# Patient Record
Sex: FEMALE | Race: WHITE | NOT HISPANIC OR LATINO | URBAN - METROPOLITAN AREA
[De-identification: names, ages, dates, MRNs, and addresses within clinical notes are randomized per-mention and may not be internally consistent; named-entity substitution may affect disease eponyms.]

---

## 2017-06-17 ENCOUNTER — EMERGENCY (EMERGENCY)
Facility: HOSPITAL | Age: 32
LOS: 1 days | Discharge: HOME | End: 2017-06-17
Attending: EMERGENCY MEDICINE | Admitting: EMERGENCY MEDICINE
Payer: COMMERCIAL

## 2017-06-17 VITALS
SYSTOLIC BLOOD PRESSURE: 122 MMHG | DIASTOLIC BLOOD PRESSURE: 79 MMHG | TEMPERATURE: 98 F | RESPIRATION RATE: 20 BRPM | HEIGHT: 61 IN | WEIGHT: 175.93 LBS | HEART RATE: 99 BPM | OXYGEN SATURATION: 98 %

## 2017-06-17 LAB
ALBUMIN SERPL ELPH-MCNC: 3.8 G/DL — SIGNIFICANT CHANGE UP (ref 3.3–5.2)
ALP SERPL-CCNC: 81 U/L — SIGNIFICANT CHANGE UP (ref 40–120)
ALT FLD-CCNC: 14 U/L — SIGNIFICANT CHANGE UP
ANION GAP SERPL CALC-SCNC: 15 MMOL/L — SIGNIFICANT CHANGE UP (ref 5–17)
AST SERPL-CCNC: 16 U/L — SIGNIFICANT CHANGE UP
BASOPHILS # BLD AUTO: 0 K/UL — SIGNIFICANT CHANGE UP (ref 0–0.2)
BASOPHILS NFR BLD AUTO: 0.1 % — SIGNIFICANT CHANGE UP (ref 0–2)
BILIRUB SERPL-MCNC: 0.4 MG/DL — SIGNIFICANT CHANGE UP (ref 0.4–2)
BUN SERPL-MCNC: 7 MG/DL — LOW (ref 8–20)
CALCIUM SERPL-MCNC: 9.2 MG/DL — SIGNIFICANT CHANGE UP (ref 8.6–10.2)
CHLORIDE SERPL-SCNC: 98 MMOL/L — SIGNIFICANT CHANGE UP (ref 98–107)
CO2 SERPL-SCNC: 21 MMOL/L — LOW (ref 22–29)
CREAT SERPL-MCNC: 0.49 MG/DL — LOW (ref 0.5–1.3)
EOSINOPHIL # BLD AUTO: 0.2 K/UL — SIGNIFICANT CHANGE UP (ref 0–0.5)
EOSINOPHIL NFR BLD AUTO: 1.2 % — SIGNIFICANT CHANGE UP (ref 0–6)
GLUCOSE SERPL-MCNC: 79 MG/DL — SIGNIFICANT CHANGE UP (ref 70–115)
HCG SERPL-ACNC: SIGNIFICANT CHANGE UP MIU/ML
HCT VFR BLD CALC: 33.8 % — LOW (ref 37–47)
HGB BLD-MCNC: 11.4 G/DL — LOW (ref 12–16)
LYMPHOCYTES # BLD AUTO: 2.9 K/UL — SIGNIFICANT CHANGE UP (ref 1–4.8)
LYMPHOCYTES # BLD AUTO: 23.2 % — SIGNIFICANT CHANGE UP (ref 20–55)
MCHC RBC-ENTMCNC: 28.6 PG — SIGNIFICANT CHANGE UP (ref 27–31)
MCHC RBC-ENTMCNC: 33.7 G/DL — SIGNIFICANT CHANGE UP (ref 32–36)
MCV RBC AUTO: 84.9 FL — SIGNIFICANT CHANGE UP (ref 81–99)
MONOCYTES # BLD AUTO: 1.1 K/UL — HIGH (ref 0–0.8)
MONOCYTES NFR BLD AUTO: 8.6 % — SIGNIFICANT CHANGE UP (ref 3–10)
NEUTROPHILS # BLD AUTO: 8.4 K/UL — HIGH (ref 1.8–8)
NEUTROPHILS NFR BLD AUTO: 66.3 % — SIGNIFICANT CHANGE UP (ref 37–73)
PLATELET # BLD AUTO: 382 K/UL — SIGNIFICANT CHANGE UP (ref 150–400)
POTASSIUM SERPL-MCNC: 4 MMOL/L — SIGNIFICANT CHANGE UP (ref 3.5–5.3)
POTASSIUM SERPL-SCNC: 4 MMOL/L — SIGNIFICANT CHANGE UP (ref 3.5–5.3)
PROT SERPL-MCNC: 7 G/DL — SIGNIFICANT CHANGE UP (ref 6.6–8.7)
RBC # BLD: 3.98 M/UL — LOW (ref 4.4–5.2)
RBC # FLD: 14 % — SIGNIFICANT CHANGE UP (ref 11–15.6)
SODIUM SERPL-SCNC: 134 MMOL/L — LOW (ref 135–145)
WBC # BLD: 12.6 K/UL — HIGH (ref 4.8–10.8)
WBC # FLD AUTO: 12.6 K/UL — HIGH (ref 4.8–10.8)

## 2017-06-17 PROCEDURE — 99283 EMERGENCY DEPT VISIT LOW MDM: CPT

## 2017-06-17 PROCEDURE — 85027 COMPLETE CBC AUTOMATED: CPT

## 2017-06-17 PROCEDURE — 84702 CHORIONIC GONADOTROPIN TEST: CPT

## 2017-06-17 PROCEDURE — 99284 EMERGENCY DEPT VISIT MOD MDM: CPT

## 2017-06-17 PROCEDURE — 80053 COMPREHEN METABOLIC PANEL: CPT

## 2017-06-17 RX ORDER — TETANUS TOXOID, REDUCED DIPHTHERIA TOXOID AND ACELLULAR PERTUSSIS VACCINE, ADSORBED 5; 2.5; 8; 8; 2.5 [IU]/.5ML; [IU]/.5ML; UG/.5ML; UG/.5ML; UG/.5ML
0.5 SUSPENSION INTRAMUSCULAR ONCE
Qty: 0 | Refills: 0 | Status: DISCONTINUED | OUTPATIENT
Start: 2017-06-17 | End: 2017-06-21

## 2017-06-17 NOTE — ED STATDOCS - ATTENDING CONTRIBUTION TO CARE
I, Leah Darling, performed the initial face to face bedside interview with this patient regarding history of present illness, review of symptoms and relevant past medical, social and family history.  I completed an independent physical examination.  I was the initial provider who evaluated this patient. I have signed out the follow up of any pending tests (i.e. labs, radiological studies) to the ACP.  I have communicated the patient’s plan of care and disposition with the ACP.  The history, relevant review of systems, past medical and surgical history, medical decision making, and physical examination was documented by the scribe in my presence and I attest to the accuracy of the documentation.

## 2017-06-17 NOTE — ED STATDOCS - PROGRESS NOTE DETAILS
JOSE Hawley Note: Received source pt information and pt made aware. Pt to follow up with [facility] within 48 hours and presently ready for discharge.

## 2017-06-17 NOTE — ED STATDOCS - OBJECTIVE STATEMENT
30 y/o 4 month pregnant F presents to the ED c/o needle stick from administering insulin to a pt today at about 2100. 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 Pt denies N/V or fever. Tetanus not UTD.

## 2017-06-17 NOTE — ED STATDOCS - SKIN, MLM
skin normal color for race, warm, dry and intact. no obvious injury. ?small tiny puncture wound to left second digit.

## 2018-02-09 PROBLEM — Z00.00 ENCOUNTER FOR PREVENTIVE HEALTH EXAMINATION: Status: ACTIVE | Noted: 2018-02-09

## 2018-02-14 ENCOUNTER — APPOINTMENT (OUTPATIENT)
Dept: UROGYNECOLOGY | Facility: CLINIC | Age: 33
End: 2018-02-14

## 2018-03-14 ENCOUNTER — APPOINTMENT (OUTPATIENT)
Dept: UROGYNECOLOGY | Facility: CLINIC | Age: 33
End: 2018-03-14
Payer: MEDICAID

## 2018-03-14 VITALS — HEIGHT: 61 IN | BODY MASS INDEX: 32.1 KG/M2 | WEIGHT: 170 LBS

## 2018-03-14 DIAGNOSIS — N39.41 URGE INCONTINENCE: ICD-10-CM

## 2018-03-14 DIAGNOSIS — R35.0 FREQUENCY OF MICTURITION: ICD-10-CM

## 2018-03-14 DIAGNOSIS — N39.3 STRESS INCONTINENCE (FEMALE) (MALE): ICD-10-CM

## 2018-03-14 PROCEDURE — 99204 OFFICE O/P NEW MOD 45 MIN: CPT | Mod: 25

## 2018-03-14 PROCEDURE — 51701 INSERT BLADDER CATHETER: CPT

## 2018-03-15 ENCOUNTER — RECORD ABSTRACTING (OUTPATIENT)
Age: 33
End: 2018-03-15

## 2018-03-15 ENCOUNTER — RESULT REVIEW (OUTPATIENT)
Age: 33
End: 2018-03-15

## 2018-03-15 DIAGNOSIS — Z82.49 FAMILY HISTORY OF ISCHEMIC HEART DISEASE AND OTHER DISEASES OF THE CIRCULATORY SYSTEM: ICD-10-CM

## 2018-03-15 DIAGNOSIS — Z83.1 FAMILY HISTORY OF OTHER INFECTIOUS AND PARASITIC DISEASES: ICD-10-CM

## 2018-03-15 DIAGNOSIS — Z83.49 FAMILY HISTORY OF OTHER ENDOCRINE, NUTRITIONAL AND METABOLIC DISEASES: ICD-10-CM

## 2018-03-15 LAB
BILIRUB UR QL STRIP: NEGATIVE
CLARITY UR: CLEAR
COLLECTION METHOD: NORMAL
GLUCOSE UR-MCNC: NEGATIVE
HCG UR QL: 0.2 EU/DL
HGB UR QL STRIP.AUTO: NEGATIVE
KETONES UR-MCNC: NEGATIVE
LEUKOCYTE ESTERASE UR QL STRIP: NEGATIVE
NITRITE UR QL STRIP: NEGATIVE
PH UR STRIP: 6
PROT UR STRIP-MCNC: NEGATIVE
SP GR UR STRIP: 1.01

## 2018-03-15 RX ORDER — LEVONORGESTREL / ETHINYL ESTRADIOL AND ETHINYL ESTRADIOL 150-30(84)
0.15-0.03 &0.01 KIT ORAL
Refills: 0 | Status: ACTIVE | COMMUNITY

## 2018-04-12 ENCOUNTER — EMERGENCY (EMERGENCY)
Facility: HOSPITAL | Age: 33
LOS: 1 days | Discharge: HOME | End: 2018-04-12
Payer: COMMERCIAL

## 2018-04-12 VITALS
SYSTOLIC BLOOD PRESSURE: 124 MMHG | RESPIRATION RATE: 18 BRPM | TEMPERATURE: 98 F | HEART RATE: 85 BPM | OXYGEN SATURATION: 97 % | DIASTOLIC BLOOD PRESSURE: 85 MMHG

## 2018-04-12 VITALS — HEIGHT: 61 IN | WEIGHT: 164.91 LBS

## 2018-04-12 PROCEDURE — 99282 EMERGENCY DEPT VISIT SF MDM: CPT | Mod: 25

## 2018-04-12 PROCEDURE — 99053 MED SERV 10PM-8AM 24 HR FAC: CPT

## 2018-04-12 PROCEDURE — 99282 EMERGENCY DEPT VISIT SF MDM: CPT
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
## 2018-07-08 ENCOUNTER — EMERGENCY (EMERGENCY)
Facility: HOSPITAL | Age: 33
LOS: 1 days | Discharge: LEFT BEFORE DISCHARGE | End: 2018-07-08
Payer: COMMERCIAL

## 2018-07-08 VITALS
TEMPERATURE: 98 F | DIASTOLIC BLOOD PRESSURE: 72 MMHG | RESPIRATION RATE: 24 BRPM | SYSTOLIC BLOOD PRESSURE: 135 MMHG | WEIGHT: 169.98 LBS | HEIGHT: 61 IN | OXYGEN SATURATION: 99 % | HEART RATE: 99 BPM

## 2018-07-08 PROCEDURE — 99283 EMERGENCY DEPT VISIT LOW MDM: CPT

## 2018-07-08 PROCEDURE — 71046 X-RAY EXAM CHEST 2 VIEWS: CPT

## 2018-07-08 PROCEDURE — 99283 EMERGENCY DEPT VISIT LOW MDM: CPT | Mod: 25

## 2018-07-08 PROCEDURE — 71046 X-RAY EXAM CHEST 2 VIEWS: CPT | Mod: 26
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
## 2018-09-26 ENCOUNTER — EMERGENCY (EMERGENCY)
Facility: HOSPITAL | Age: 33
LOS: 1 days | Discharge: HOME | End: 2018-09-26
Attending: EMERGENCY MEDICINE
Payer: COMMERCIAL

## 2018-09-26 VITALS
OXYGEN SATURATION: 99 % | HEIGHT: 66 IN | DIASTOLIC BLOOD PRESSURE: 100 MMHG | SYSTOLIC BLOOD PRESSURE: 141 MMHG | TEMPERATURE: 98 F | RESPIRATION RATE: 24 BRPM | HEART RATE: 101 BPM | WEIGHT: 149.91 LBS

## 2018-09-26 LAB
ALBUMIN SERPL ELPH-MCNC: 4.3 G/DL — SIGNIFICANT CHANGE UP (ref 3.3–5.2)
ALP SERPL-CCNC: 98 U/L — SIGNIFICANT CHANGE UP (ref 40–120)
ALT FLD-CCNC: 19 U/L — SIGNIFICANT CHANGE UP
ANION GAP SERPL CALC-SCNC: 15 MMOL/L — SIGNIFICANT CHANGE UP (ref 5–17)
AST SERPL-CCNC: 19 U/L — SIGNIFICANT CHANGE UP
BASOPHILS # BLD AUTO: 0 K/UL — SIGNIFICANT CHANGE UP (ref 0–0.2)
BASOPHILS NFR BLD AUTO: 0.1 % — SIGNIFICANT CHANGE UP (ref 0–2)
BILIRUB SERPL-MCNC: 0.4 MG/DL — SIGNIFICANT CHANGE UP (ref 0.4–2)
BUN SERPL-MCNC: 12 MG/DL — SIGNIFICANT CHANGE UP (ref 8–20)
CALCIUM SERPL-MCNC: 9.7 MG/DL — SIGNIFICANT CHANGE UP (ref 8.6–10.2)
CHLORIDE SERPL-SCNC: 100 MMOL/L — SIGNIFICANT CHANGE UP (ref 98–107)
CK SERPL-CCNC: 63 U/L — SIGNIFICANT CHANGE UP (ref 25–170)
CO2 SERPL-SCNC: 21 MMOL/L — LOW (ref 22–29)
CREAT SERPL-MCNC: 0.71 MG/DL — SIGNIFICANT CHANGE UP (ref 0.5–1.3)
D DIMER BLD IA.RAPID-MCNC: <150 NG/ML DDU — SIGNIFICANT CHANGE UP
EOSINOPHIL # BLD AUTO: 0.4 K/UL — SIGNIFICANT CHANGE UP (ref 0–0.5)
EOSINOPHIL NFR BLD AUTO: 2.9 % — SIGNIFICANT CHANGE UP (ref 0–6)
GLUCOSE SERPL-MCNC: 109 MG/DL — SIGNIFICANT CHANGE UP (ref 70–115)
HCG SERPL-ACNC: <5 MIU/ML — SIGNIFICANT CHANGE UP
HCT VFR BLD CALC: 38.8 % — SIGNIFICANT CHANGE UP (ref 37–47)
HGB BLD-MCNC: 12.6 G/DL — SIGNIFICANT CHANGE UP (ref 12–16)
LYMPHOCYTES # BLD AUTO: 32.3 % — SIGNIFICANT CHANGE UP (ref 20–55)
LYMPHOCYTES # BLD AUTO: 4.4 K/UL — SIGNIFICANT CHANGE UP (ref 1–4.8)
MCHC RBC-ENTMCNC: 26.4 PG — LOW (ref 27–31)
MCHC RBC-ENTMCNC: 32.5 G/DL — SIGNIFICANT CHANGE UP (ref 32–36)
MCV RBC AUTO: 81.2 FL — SIGNIFICANT CHANGE UP (ref 81–99)
MONOCYTES # BLD AUTO: 1 K/UL — HIGH (ref 0–0.8)
MONOCYTES NFR BLD AUTO: 7.2 % — SIGNIFICANT CHANGE UP (ref 3–10)
NEUTROPHILS # BLD AUTO: 7.7 K/UL — SIGNIFICANT CHANGE UP (ref 1.8–8)
NEUTROPHILS NFR BLD AUTO: 57.3 % — SIGNIFICANT CHANGE UP (ref 37–73)
PLATELET # BLD AUTO: 459 K/UL — HIGH (ref 150–400)
POTASSIUM SERPL-MCNC: 3.7 MMOL/L — SIGNIFICANT CHANGE UP (ref 3.5–5.3)
POTASSIUM SERPL-SCNC: 3.7 MMOL/L — SIGNIFICANT CHANGE UP (ref 3.5–5.3)
PROT SERPL-MCNC: 7.6 G/DL — SIGNIFICANT CHANGE UP (ref 6.6–8.7)
RBC # BLD: 4.78 M/UL — SIGNIFICANT CHANGE UP (ref 4.4–5.2)
RBC # FLD: 14 % — SIGNIFICANT CHANGE UP (ref 11–15.6)
SODIUM SERPL-SCNC: 136 MMOL/L — SIGNIFICANT CHANGE UP (ref 135–145)
TROPONIN T SERPL-MCNC: <0.01 NG/ML — SIGNIFICANT CHANGE UP (ref 0–0.06)
TSH SERPL-MCNC: 2.08 UIU/ML — SIGNIFICANT CHANGE UP (ref 0.27–4.2)
WBC # BLD: 13.5 K/UL — HIGH (ref 4.8–10.8)
WBC # FLD AUTO: 13.5 K/UL — HIGH (ref 4.8–10.8)

## 2018-09-26 PROCEDURE — 96374 THER/PROPH/DIAG INJ IV PUSH: CPT

## 2018-09-26 PROCEDURE — 99284 EMERGENCY DEPT VISIT MOD MDM: CPT | Mod: 25

## 2018-09-26 PROCEDURE — 71045 X-RAY EXAM CHEST 1 VIEW: CPT

## 2018-09-26 PROCEDURE — 71045 X-RAY EXAM CHEST 1 VIEW: CPT | Mod: 26

## 2018-09-26 PROCEDURE — 80053 COMPREHEN METABOLIC PANEL: CPT

## 2018-09-26 PROCEDURE — 84702 CHORIONIC GONADOTROPIN TEST: CPT

## 2018-09-26 PROCEDURE — 85027 COMPLETE CBC AUTOMATED: CPT

## 2018-09-26 PROCEDURE — 82550 ASSAY OF CK (CPK): CPT

## 2018-09-26 PROCEDURE — 36415 COLL VENOUS BLD VENIPUNCTURE: CPT

## 2018-09-26 PROCEDURE — 99053 MED SERV 10PM-8AM 24 HR FAC: CPT

## 2018-09-26 PROCEDURE — 85379 FIBRIN DEGRADATION QUANT: CPT

## 2018-09-26 PROCEDURE — 84484 ASSAY OF TROPONIN QUANT: CPT

## 2018-09-26 PROCEDURE — 94640 AIRWAY INHALATION TREATMENT: CPT

## 2018-09-26 PROCEDURE — 84443 ASSAY THYROID STIM HORMONE: CPT

## 2018-09-26 PROCEDURE — 99285 EMERGENCY DEPT VISIT HI MDM: CPT | Mod: 25

## 2018-09-26 RX ORDER — ALBUTEROL 90 UG/1
2.5 AEROSOL, METERED ORAL ONCE
Qty: 0 | Refills: 0 | Status: COMPLETED | OUTPATIENT
Start: 2018-09-26 | End: 2018-09-26

## 2018-09-26 RX ORDER — SODIUM CHLORIDE 9 MG/ML
3 INJECTION INTRAMUSCULAR; INTRAVENOUS; SUBCUTANEOUS ONCE
Qty: 0 | Refills: 0 | Status: COMPLETED | OUTPATIENT
Start: 2018-09-26 | End: 2018-09-26

## 2018-09-26 RX ADMIN — Medication 125 MILLIGRAM(S): at 23:25

## 2018-09-26 RX ADMIN — ALBUTEROL 2.5 MILLIGRAM(S): 90 AEROSOL, METERED ORAL at 23:26

## 2018-09-26 RX ADMIN — SODIUM CHLORIDE 3 MILLILITER(S): 9 INJECTION INTRAMUSCULAR; INTRAVENOUS; SUBCUTANEOUS at 23:19

## 2018-09-26 NOTE — ED PROVIDER NOTE - OBJECTIVE STATEMENT
33 y/o F pt with hx of asthma presents to ED c/o chest tightness, dizziness and SOB that onset suddenly today. She also notes nasal congestion. 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 She reports it is difficult for her to catch her breath. Pt's last asthma attack was over 15 years ago. Pt is on OCP's and gets her period 3-4 times a year, she is due to get her period next week. Nonsmoker. Denies nausea and vomiting. No further complaints at this time.

## 2018-09-26 NOTE — ED ADULT TRIAGE NOTE - CHIEF COMPLAINT QUOTE
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 Hx of Asthma, not currently on any inhalers

## 2018-09-26 NOTE — ED ADULT NURSE NOTE - NSIMPLEMENTINTERV_GEN_ALL_ED
Implemented All Universal Safety Interventions:  [place] to call system. Call bell, personal items and telephone within reach. Instruct patient to call for assistance. Room bathroom lighting operational. Non-slip footwear when patient is off stretcher. Physically safe environment: no spills, clutter or unnecessary equipment. Stretcher in lowest position, wheels locked, appropriate side rails in place.

## 2018-09-27 VITALS — RESPIRATION RATE: 21 BRPM | HEART RATE: 98 BPM | TEMPERATURE: 98 F | OXYGEN SATURATION: 100 %

## 2018-09-27 RX ORDER — MONTELUKAST 4 MG/1
10 TABLET, CHEWABLE ORAL ONCE
Qty: 0 | Refills: 0 | Status: DISCONTINUED | OUTPATIENT
Start: 2018-09-27 | End: 2018-10-01

## 2018-09-27 RX ORDER — MONTELUKAST 4 MG/1
1 TABLET, CHEWABLE ORAL
Qty: 15 | Refills: 0
Start: 2018-09-27 | End: 2018-10-11

## 2019-01-02 ENCOUNTER — TRANSCRIPTION ENCOUNTER (OUTPATIENT)
Age: 34
End: 2019-01-02

## 2019-03-09 ENCOUNTER — TRANSCRIPTION ENCOUNTER (OUTPATIENT)
Age: 34
End: 2019-03-09

## 2019-05-22 ENCOUNTER — EMERGENCY (EMERGENCY)
Facility: HOSPITAL | Age: 34
LOS: 1 days | Discharge: HOME | End: 2019-05-22
Attending: EMERGENCY MEDICINE
Payer: COMMERCIAL

## 2019-05-22 VITALS
OXYGEN SATURATION: 99 % | HEART RATE: 103 BPM | SYSTOLIC BLOOD PRESSURE: 136 MMHG | RESPIRATION RATE: 16 BRPM | DIASTOLIC BLOOD PRESSURE: 86 MMHG | WEIGHT: 164.91 LBS | HEIGHT: 61 IN | TEMPERATURE: 98 F

## 2019-05-22 LAB
ALBUMIN SERPL ELPH-MCNC: 3.9 G/DL — SIGNIFICANT CHANGE UP (ref 3.3–5.2)
ALP SERPL-CCNC: 97 U/L — SIGNIFICANT CHANGE UP (ref 40–120)
ALT FLD-CCNC: 13 U/L — SIGNIFICANT CHANGE UP
ANION GAP SERPL CALC-SCNC: 11 MMOL/L — SIGNIFICANT CHANGE UP (ref 5–17)
AST SERPL-CCNC: 16 U/L — SIGNIFICANT CHANGE UP
BASOPHILS # BLD AUTO: 0 K/UL — SIGNIFICANT CHANGE UP (ref 0–0.2)
BASOPHILS NFR BLD AUTO: 0.1 % — SIGNIFICANT CHANGE UP (ref 0–2)
BILIRUB SERPL-MCNC: 0.3 MG/DL — LOW (ref 0.4–2)
BUN SERPL-MCNC: 12 MG/DL — SIGNIFICANT CHANGE UP (ref 8–20)
CALCIUM SERPL-MCNC: 9.7 MG/DL — SIGNIFICANT CHANGE UP (ref 8.6–10.2)
CHLORIDE SERPL-SCNC: 105 MMOL/L — SIGNIFICANT CHANGE UP (ref 98–107)
CK SERPL-CCNC: 56 U/L — SIGNIFICANT CHANGE UP (ref 25–170)
CO2 SERPL-SCNC: 21 MMOL/L — LOW (ref 22–29)
CREAT SERPL-MCNC: 0.67 MG/DL — SIGNIFICANT CHANGE UP (ref 0.5–1.3)
D DIMER BLD IA.RAPID-MCNC: <150 NG/ML DDU — SIGNIFICANT CHANGE UP
EOSINOPHIL # BLD AUTO: 0.2 K/UL — SIGNIFICANT CHANGE UP (ref 0–0.5)
EOSINOPHIL NFR BLD AUTO: 2.8 % — SIGNIFICANT CHANGE UP (ref 0–6)
GLUCOSE SERPL-MCNC: 91 MG/DL — SIGNIFICANT CHANGE UP (ref 70–115)
HCT VFR BLD CALC: 38.2 % — SIGNIFICANT CHANGE UP (ref 37–47)
HGB BLD-MCNC: 13.1 G/DL — SIGNIFICANT CHANGE UP (ref 12–16)
LYMPHOCYTES # BLD AUTO: 2.5 K/UL — SIGNIFICANT CHANGE UP (ref 1–4.8)
LYMPHOCYTES # BLD AUTO: 29.3 % — SIGNIFICANT CHANGE UP (ref 20–55)
MCHC RBC-ENTMCNC: 28.2 PG — SIGNIFICANT CHANGE UP (ref 27–31)
MCHC RBC-ENTMCNC: 34.3 G/DL — SIGNIFICANT CHANGE UP (ref 32–36)
MCV RBC AUTO: 82.3 FL — SIGNIFICANT CHANGE UP (ref 81–99)
MONOCYTES # BLD AUTO: 0.8 K/UL — SIGNIFICANT CHANGE UP (ref 0–0.8)
MONOCYTES NFR BLD AUTO: 9.8 % — SIGNIFICANT CHANGE UP (ref 3–10)
NEUTROPHILS # BLD AUTO: 4.9 K/UL — SIGNIFICANT CHANGE UP (ref 1.8–8)
NEUTROPHILS NFR BLD AUTO: 57.9 % — SIGNIFICANT CHANGE UP (ref 37–73)
PLATELET # BLD AUTO: 382 K/UL — SIGNIFICANT CHANGE UP (ref 150–400)
POTASSIUM SERPL-MCNC: 4.2 MMOL/L — SIGNIFICANT CHANGE UP (ref 3.5–5.3)
POTASSIUM SERPL-SCNC: 4.2 MMOL/L — SIGNIFICANT CHANGE UP (ref 3.5–5.3)
PROT SERPL-MCNC: 7.2 G/DL — SIGNIFICANT CHANGE UP (ref 6.6–8.7)
RBC # BLD: 4.64 M/UL — SIGNIFICANT CHANGE UP (ref 4.4–5.2)
RBC # FLD: 13.8 % — SIGNIFICANT CHANGE UP (ref 11–15.6)
SODIUM SERPL-SCNC: 137 MMOL/L — SIGNIFICANT CHANGE UP (ref 135–145)
TROPONIN T SERPL-MCNC: <0.01 NG/ML — SIGNIFICANT CHANGE UP (ref 0–0.06)
WBC # BLD: 8.5 K/UL — SIGNIFICANT CHANGE UP (ref 4.8–10.8)
WBC # FLD AUTO: 8.5 K/UL — SIGNIFICANT CHANGE UP (ref 4.8–10.8)

## 2019-05-22 PROCEDURE — 71046 X-RAY EXAM CHEST 2 VIEWS: CPT

## 2019-05-22 PROCEDURE — 36415 COLL VENOUS BLD VENIPUNCTURE: CPT

## 2019-05-22 PROCEDURE — 93005 ELECTROCARDIOGRAM TRACING: CPT

## 2019-05-22 PROCEDURE — 85379 FIBRIN DEGRADATION QUANT: CPT

## 2019-05-22 PROCEDURE — 82550 ASSAY OF CK (CPK): CPT

## 2019-05-22 PROCEDURE — 71046 X-RAY EXAM CHEST 2 VIEWS: CPT | Mod: 26

## 2019-05-22 PROCEDURE — 84484 ASSAY OF TROPONIN QUANT: CPT

## 2019-05-22 PROCEDURE — 93010 ELECTROCARDIOGRAM REPORT: CPT

## 2019-05-22 PROCEDURE — 99283 EMERGENCY DEPT VISIT LOW MDM: CPT

## 2019-05-22 PROCEDURE — 85027 COMPLETE CBC AUTOMATED: CPT

## 2019-05-22 PROCEDURE — 80053 COMPREHEN METABOLIC PANEL: CPT

## 2019-05-22 PROCEDURE — 99285 EMERGENCY DEPT VISIT HI MDM: CPT

## 2019-05-22 RX ORDER — ASPIRIN/CALCIUM CARB/MAGNESIUM 324 MG
325 TABLET ORAL ONCE
Refills: 0 | Status: COMPLETED | OUTPATIENT
Start: 2019-05-22 | End: 2019-05-22

## 2019-05-22 RX ADMIN — Medication 325 MILLIGRAM(S): at 20:16

## 2019-05-22 NOTE — ED ADULT NURSE NOTE - NSIMPLEMENTINTERV_GEN_ALL_ED
Implemented All Universal Safety Interventions:  [place] to call system. Call bell, personal items and telephone within reach. Instruct patient to call for assistance. Room bathroom lighting operational. Non-slip footwear when patient is off stretcher. Physically safe environment: no spills, clutter or unnecessary equipment. Stretcher in lowest position, wheels locked, appropriate side rails in place.

## 2019-05-22 NOTE — ED ADULT NURSE NOTE - RESPIRATORY ASSESSMENT
Recommended weight and BMI control through healthy diet and exercise, green leafy veggies, UV protection, and not smoking. Reviewed the benefits of AREDS II VITAMINS VERUS GENOTYPE directed vitamin therapy, and recommended following one or the other to try and prevent the progression of the disease. Reviewed the importance of daily monitoring of the vision in each eye independently, along with the use of the Amsler grid daily and instructed patient to call and return immediately for any new changes in their vision or on the Amsler grid. Patient instructed on the importance of regular follow up and monitoring for the early detection of conversion to wet AMD as early detection results in early treatment and better outcomes. WDL

## 2019-05-22 NOTE — ED ADULT NURSE NOTE - OBJECTIVE STATEMENT
Patient presents to ED with c/o  chest pressure radiating down left arm and SOB  that started while driving to work  today at 6 pm. no PMH. Patient presents to ED A/Ox4, VSS.  Respirations are even and unlabored, lungs cta, +bowel x4 quads, abdomen soft, nontender/nondistended, skin w/d/i.

## 2019-05-22 NOTE — ED ADULT TRIAGE NOTE - CHIEF COMPLAINT QUOTE
pt comes to ed reporting chest pressure radiating down left arm that started while driving today at 6 pm. patient denies cardiac hx. no long trips car/plane. LMP

## 2019-05-22 NOTE — ED ADULT NURSE NOTE - CHPI ED NUR SYMPTOMS NEG
no vomiting/no syncope/no fever/no back pain/no chills/no nausea/no dizziness/no congestion/no diaphoresis

## 2019-05-22 NOTE — ED PROVIDER NOTE - CLINICAL SUMMARY MEDICAL DECISION MAKING FREE TEXT BOX
33 year old woman with no risk factors for CAD c/o chest pain now resolved.  EKG non-ischemic, xray negative for acute pathology, and d-dimer negative.  Patient instructed to take tylenol or motrin for pain and follow-up with cardiology as outpatient.

## 2019-05-23 VITALS
TEMPERATURE: 98 F | OXYGEN SATURATION: 98 % | RESPIRATION RATE: 19 BRPM | SYSTOLIC BLOOD PRESSURE: 118 MMHG | HEART RATE: 84 BPM | DIASTOLIC BLOOD PRESSURE: 76 MMHG

## 2019-05-31 ENCOUNTER — APPOINTMENT (OUTPATIENT)
Dept: GASTROENTEROLOGY | Facility: CLINIC | Age: 34
End: 2019-05-31
Payer: COMMERCIAL

## 2019-05-31 VITALS
OXYGEN SATURATION: 99 % | HEART RATE: 85 BPM | BODY MASS INDEX: 32.1 KG/M2 | HEIGHT: 61 IN | RESPIRATION RATE: 16 BRPM | DIASTOLIC BLOOD PRESSURE: 87 MMHG | WEIGHT: 170 LBS | SYSTOLIC BLOOD PRESSURE: 127 MMHG

## 2019-05-31 PROCEDURE — 82272 OCCULT BLD FECES 1-3 TESTS: CPT

## 2019-05-31 PROCEDURE — 99204 OFFICE O/P NEW MOD 45 MIN: CPT

## 2019-05-31 RX ORDER — POLYETHYLENE GLYCOL 3350 17 G/17G
17 POWDER, FOR SOLUTION ORAL DAILY
Qty: 3 | Refills: 3 | Status: ACTIVE | COMMUNITY
Start: 2019-05-31 | End: 1900-01-01

## 2019-05-31 RX ORDER — HYDROCORTISONE 2.5% 25 MG/G
2.5 CREAM TOPICAL TWICE DAILY
Qty: 1 | Refills: 5 | Status: ACTIVE | COMMUNITY
Start: 2019-05-31 | End: 1900-01-01

## 2019-05-31 NOTE — ASSESSMENT
[FreeTextEntry1] : A/P\par  Pt with anal fissure, small exrternal hemorrhoid\par - had HA with NGT. Symptoms not better with stool softener and fiber\par - will give colorectal evaluation\par - proctosol cream for hemorrhoids\par - will try miralax to keep stool very soft\par - F/U in 6 weeks

## 2019-05-31 NOTE — HISTORY OF PRESENT ILLNESS
[de-identified] : Evaluation of rectal bleeding, rectal pain and constipation.\par     The patient gave birth 1.5 years ago. Initially she was diagnosed with hemorrhoids and the hemorrhoids seemed to resolve with sitz baths.\par     One year ago she began with severe anal pain with bowel movements. She associated moderate rectal bleeding at the time of bowel movement. She is moderately constipated for the past one year. Her constipation occurs 2-3 times a week. It is better with fiber capsules and Colace. She was seen by another GI and was told she had an anal fissure. She was given nitroglycerin ointment which gave her headaches. She continues to have pain and bleeding with bowel movement. She states after bowel movement she does not feel clean even after wiping. She has no family history of colon cancer or colon polyps.

## 2019-05-31 NOTE — REVIEW OF SYSTEMS
[Constipation] : constipation [Negative] : Endocrine [FreeTextEntry7] : rectal bleeding, anal pain,

## 2019-05-31 NOTE — REASON FOR VISIT
[Initial Evaluation] : an initial evaluation [FreeTextEntry1] :  Patient with rectal bleeding , constipation

## 2019-05-31 NOTE — PHYSICAL EXAM
[General Appearance - Alert] : alert [General Appearance - In No Acute Distress] : in no acute distress [General Appearance - Well Nourished] : well nourished [General Appearance - Well Developed] : well developed [Sclera] : the sclera and conjunctiva were normal [Outer Ear] : the ears and nose were normal in appearance [Neck Appearance] : the appearance of the neck was normal [Respiration, Rhythm And Depth] : normal respiratory rhythm and effort [Exaggerated Use Of Accessory Muscles For Inspiration] : no accessory muscle use [Auscultation Breath Sounds / Voice Sounds] : lungs were clear to auscultation bilaterally [Apical Impulse] : the apical impulse was normal [Heart Rate And Rhythm] : heart rate was normal and rhythm regular [Heart Sounds] : normal S1 and S2 [Bowel Sounds] : normal bowel sounds [Abdomen Soft] : soft [Normal Sphincter Tone] : normal sphincter tone [No Rectal Mass] : no rectal mass [Internal Hemorrhoid] : no internal hemorrhoids [External Hemorrhoid] : external hemorrhoids [Occult Blood Positive] : stool was negative for occult blood [FreeTextEntry1] : anal fissure at 6  oclock [Skin Color & Pigmentation] : normal skin color and pigmentation [Skin Turgor] : normal skin turgor [] : no rash [Oriented To Time, Place, And Person] : oriented to person, place, and time [Impaired Insight] : insight and judgment were intact [Affect] : the affect was normal

## 2019-06-04 ENCOUNTER — APPOINTMENT (OUTPATIENT)
Dept: COLORECTAL SURGERY | Facility: CLINIC | Age: 34
End: 2019-06-04
Payer: COMMERCIAL

## 2019-06-04 VITALS
TEMPERATURE: 98.4 F | RESPIRATION RATE: 14 BRPM | BODY MASS INDEX: 32.1 KG/M2 | DIASTOLIC BLOOD PRESSURE: 77 MMHG | SYSTOLIC BLOOD PRESSURE: 113 MMHG | HEIGHT: 61 IN | WEIGHT: 170 LBS | HEART RATE: 74 BPM

## 2019-06-04 DIAGNOSIS — K60.2 ANAL FISSURE, UNSPECIFIED: ICD-10-CM

## 2019-06-04 DIAGNOSIS — K62.5 HEMORRHAGE OF ANUS AND RECTUM: ICD-10-CM

## 2019-06-04 PROCEDURE — 99203 OFFICE O/P NEW LOW 30 MIN: CPT

## 2019-06-04 NOTE — PHYSICAL EXAM
[Anterior] : anteriorly [No Rash or Lesion] : No rash or lesion [Posterior] : posteriorly [Alert] : alert [Oriented to Person] : oriented to person [Oriented to Time] : oriented to time [Oriented to Place] : oriented to place [Calm] : calm [de-identified] : Anterior AF with associated sentinel pile, posterior anal fissure [de-identified] : NAD [de-identified] : DENNEY x 4 [de-identified] : NCAT

## 2019-06-04 NOTE — ASSESSMENT
[FreeTextEntry1] : Ms. Dave presents to the office with anterior and posterior anal fissures. The cause of anal fissures, including hard and traumatic stools as well as diarrheal stools were discussed. Conservative management with the usage of creams, including diltiazem cream 2% t.i.d. and Analpram cream 2.5% t.i.d., to the fissure site for 4-6 weeks will be attempted in order to allow for healing without surgical intervention. In order to prevent worsening of the fissure symptoms, and/or recurrence, the following daily dietary recommendations were made : high fiber (25-30 g ), water 80 ounces, +/- MiraLax daily.  Sitz baths should also be after bowel movements and for comfort. If compliant with the above, over 80% of patients will heal with these measures . Improvement of symptoms can be seen 2 weeks after initiation of treatment. If the patient continues to have symptoms despite strict compliance to the above, a return visit will need to be scheduled. At that time, botox injection vs  sphincterotomy and fissurectomy will be reviewed and discussed.\par

## 2019-06-04 NOTE — HISTORY OF PRESENT ILLNESS
[FreeTextEntry1] : Ms. Dave presents to the office for consultation of her anal fissure.\par Anal fissure for 1.5 years.\par Intermittent symptoms of hemorrhoidal burning/itching and pain.\par Attempted treatment in past with rectiv but noncompliant secondary to side effect of HAs.\par Has been trying to soften stools with miralax.

## 2019-07-18 ENCOUNTER — APPOINTMENT (OUTPATIENT)
Dept: GASTROENTEROLOGY | Facility: CLINIC | Age: 34
End: 2019-07-18

## 2019-10-15 ENCOUNTER — EMERGENCY (EMERGENCY)
Facility: HOSPITAL | Age: 34
LOS: 1 days | Discharge: HOME | End: 2019-10-15
Attending: EMERGENCY MEDICINE
Payer: COMMERCIAL

## 2019-10-15 VITALS
HEART RATE: 84 BPM | OXYGEN SATURATION: 97 % | DIASTOLIC BLOOD PRESSURE: 85 MMHG | HEIGHT: 61 IN | SYSTOLIC BLOOD PRESSURE: 126 MMHG | RESPIRATION RATE: 16 BRPM | WEIGHT: 164.91 LBS | TEMPERATURE: 98 F

## 2019-10-15 PROCEDURE — 99282 EMERGENCY DEPT VISIT SF MDM: CPT

## 2019-10-15 NOTE — ED ADULT TRIAGE NOTE - CHIEF COMPLAINT QUOTE
Patient A&Ox4, denies any pain or discomfort. 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 UTD with all vaccinations.

## 2019-10-16 NOTE — ED PROVIDER NOTE - CLINICAL SUMMARY MEDICAL DECISION MAKING FREE TEXT BOX
blood borne esposure pathway follwed RN asgrees with paln of care abbi cormier packet pt agrees with plan of care

## 2019-10-16 NOTE — ED PROVIDER NOTE - PATIENT PORTAL LINK FT
You can access the FollowMyHealth Patient Portal offered by [facility] by registering at the following website: [email or web address]. By joining [employer]’s FollowMyHealth portal, you will also be able to view your health information using other applications (apps) compatible with our system.

## 2020-01-13 NOTE — ED ADULT NURSE NOTE - OBJECTIVE STATEMENT
[Annual Visit] : annual visit Pt. c/o difficulty breathing, dizziness, and congestion. Hx of asthma, has not needed inhalers for past couple years

## 2020-03-17 ENCOUNTER — EMERGENCY (EMERGENCY)
Facility: HOSPITAL | Age: 35
LOS: 1 days | Discharge: HOME | End: 2020-03-17
Attending: EMERGENCY MEDICINE
Payer: COMMERCIAL

## 2020-03-17 VITALS
TEMPERATURE: 98 F | HEART RATE: 88 BPM | DIASTOLIC BLOOD PRESSURE: 78 MMHG | SYSTOLIC BLOOD PRESSURE: 130 MMHG | RESPIRATION RATE: 16 BRPM | OXYGEN SATURATION: 98 %

## 2020-03-17 VITALS
HEART RATE: 92 BPM | TEMPERATURE: 98 F | OXYGEN SATURATION: 97 % | RESPIRATION RATE: 18 BRPM | DIASTOLIC BLOOD PRESSURE: 82 MMHG | SYSTOLIC BLOOD PRESSURE: 135 MMHG

## 2020-03-17 LAB
ALBUMIN SERPL ELPH-MCNC: 4.1 G/DL — SIGNIFICANT CHANGE UP (ref 3.3–5.2)
ALP SERPL-CCNC: 104 U/L — SIGNIFICANT CHANGE UP (ref 40–120)
ALT FLD-CCNC: 17 U/L — SIGNIFICANT CHANGE UP
ANION GAP SERPL CALC-SCNC: 13 MMOL/L — SIGNIFICANT CHANGE UP (ref 5–17)
AST SERPL-CCNC: 17 U/L — SIGNIFICANT CHANGE UP
BASOPHILS # BLD AUTO: 0.02 K/UL — SIGNIFICANT CHANGE UP (ref 0–0.2)
BASOPHILS NFR BLD AUTO: 0.2 % — SIGNIFICANT CHANGE UP (ref 0–2)
BILIRUB SERPL-MCNC: <0.2 MG/DL — LOW (ref 0.4–2)
BUN SERPL-MCNC: 15 MG/DL — SIGNIFICANT CHANGE UP (ref 8–20)
CALCIUM SERPL-MCNC: 9.5 MG/DL — SIGNIFICANT CHANGE UP (ref 8.6–10.2)
CHLORIDE SERPL-SCNC: 100 MMOL/L — SIGNIFICANT CHANGE UP (ref 98–107)
CK SERPL-CCNC: 55 U/L — SIGNIFICANT CHANGE UP (ref 25–170)
CO2 SERPL-SCNC: 22 MMOL/L — SIGNIFICANT CHANGE UP (ref 22–29)
CREAT SERPL-MCNC: 0.74 MG/DL — SIGNIFICANT CHANGE UP (ref 0.5–1.3)
D DIMER BLD IA.RAPID-MCNC: <150 NG/ML DDU — SIGNIFICANT CHANGE UP
EOSINOPHIL # BLD AUTO: 0.24 K/UL — SIGNIFICANT CHANGE UP (ref 0–0.5)
EOSINOPHIL NFR BLD AUTO: 2.3 % — SIGNIFICANT CHANGE UP (ref 0–6)
GLUCOSE SERPL-MCNC: 98 MG/DL — SIGNIFICANT CHANGE UP (ref 70–99)
HCG SERPL-ACNC: <4 MIU/ML — SIGNIFICANT CHANGE UP
HCT VFR BLD CALC: 39.2 % — SIGNIFICANT CHANGE UP (ref 34.5–45)
HGB BLD-MCNC: 13 G/DL — SIGNIFICANT CHANGE UP (ref 11.5–15.5)
IMM GRANULOCYTES NFR BLD AUTO: 0.4 % — SIGNIFICANT CHANGE UP (ref 0–1.5)
LYMPHOCYTES # BLD AUTO: 2.97 K/UL — SIGNIFICANT CHANGE UP (ref 1–3.3)
LYMPHOCYTES # BLD AUTO: 29 % — SIGNIFICANT CHANGE UP (ref 13–44)
MCHC RBC-ENTMCNC: 28 PG — SIGNIFICANT CHANGE UP (ref 27–34)
MCHC RBC-ENTMCNC: 33.2 GM/DL — SIGNIFICANT CHANGE UP (ref 32–36)
MCV RBC AUTO: 84.3 FL — SIGNIFICANT CHANGE UP (ref 80–100)
MONOCYTES # BLD AUTO: 0.8 K/UL — SIGNIFICANT CHANGE UP (ref 0–0.9)
MONOCYTES NFR BLD AUTO: 7.8 % — SIGNIFICANT CHANGE UP (ref 2–14)
NEUTROPHILS # BLD AUTO: 6.16 K/UL — SIGNIFICANT CHANGE UP (ref 1.8–7.4)
NEUTROPHILS NFR BLD AUTO: 60.3 % — SIGNIFICANT CHANGE UP (ref 43–77)
PLATELET # BLD AUTO: 420 K/UL — HIGH (ref 150–400)
POTASSIUM SERPL-MCNC: 4 MMOL/L — SIGNIFICANT CHANGE UP (ref 3.5–5.3)
POTASSIUM SERPL-SCNC: 4 MMOL/L — SIGNIFICANT CHANGE UP (ref 3.5–5.3)
PROT SERPL-MCNC: 7.1 G/DL — SIGNIFICANT CHANGE UP (ref 6.6–8.7)
RBC # BLD: 4.65 M/UL — SIGNIFICANT CHANGE UP (ref 3.8–5.2)
RBC # FLD: 13 % — SIGNIFICANT CHANGE UP (ref 10.3–14.5)
SODIUM SERPL-SCNC: 135 MMOL/L — SIGNIFICANT CHANGE UP (ref 135–145)
TROPONIN T SERPL-MCNC: <0.01 NG/ML — SIGNIFICANT CHANGE UP (ref 0–0.06)
WBC # BLD: 10.23 K/UL — SIGNIFICANT CHANGE UP (ref 3.8–10.5)
WBC # FLD AUTO: 10.23 K/UL — SIGNIFICANT CHANGE UP (ref 3.8–10.5)

## 2020-03-17 PROCEDURE — 82550 ASSAY OF CK (CPK): CPT

## 2020-03-17 PROCEDURE — 99284 EMERGENCY DEPT VISIT MOD MDM: CPT

## 2020-03-17 PROCEDURE — 85027 COMPLETE CBC AUTOMATED: CPT

## 2020-03-17 PROCEDURE — 96374 THER/PROPH/DIAG INJ IV PUSH: CPT

## 2020-03-17 PROCEDURE — 80053 COMPREHEN METABOLIC PANEL: CPT

## 2020-03-17 PROCEDURE — 84484 ASSAY OF TROPONIN QUANT: CPT

## 2020-03-17 PROCEDURE — 71046 X-RAY EXAM CHEST 2 VIEWS: CPT | Mod: 26

## 2020-03-17 PROCEDURE — 71046 X-RAY EXAM CHEST 2 VIEWS: CPT

## 2020-03-17 PROCEDURE — 99284 EMERGENCY DEPT VISIT MOD MDM: CPT | Mod: 25

## 2020-03-17 PROCEDURE — 36415 COLL VENOUS BLD VENIPUNCTURE: CPT

## 2020-03-17 PROCEDURE — 85379 FIBRIN DEGRADATION QUANT: CPT

## 2020-03-17 PROCEDURE — 84702 CHORIONIC GONADOTROPIN TEST: CPT

## 2020-03-17 RX ORDER — OXYCODONE AND ACETAMINOPHEN 5; 325 MG/1; MG/1
1 TABLET ORAL ONCE
Refills: 0 | Status: DISCONTINUED | OUTPATIENT
Start: 2020-03-17 | End: 2020-03-17

## 2020-03-17 RX ORDER — KETOROLAC TROMETHAMINE 30 MG/ML
30 SYRINGE (ML) INJECTION ONCE
Refills: 0 | Status: DISCONTINUED | OUTPATIENT
Start: 2020-03-17 | End: 2020-03-17

## 2020-03-17 RX ORDER — SODIUM CHLORIDE 9 MG/ML
3 INJECTION INTRAMUSCULAR; INTRAVENOUS; SUBCUTANEOUS ONCE
Refills: 0 | Status: COMPLETED | OUTPATIENT
Start: 2020-03-17 | End: 2020-03-17

## 2020-03-17 RX ORDER — ACETAMINOPHEN 500 MG
650 TABLET ORAL ONCE
Refills: 0 | Status: COMPLETED | OUTPATIENT
Start: 2020-03-17 | End: 2020-03-17

## 2020-03-17 RX ADMIN — Medication 30 MILLIGRAM(S): at 21:41

## 2020-03-17 RX ADMIN — OXYCODONE AND ACETAMINOPHEN 1 TABLET(S): 5; 325 TABLET ORAL at 22:39

## 2020-03-17 RX ADMIN — SODIUM CHLORIDE 3 MILLILITER(S): 9 INJECTION INTRAMUSCULAR; INTRAVENOUS; SUBCUTANEOUS at 21:23

## 2020-03-17 RX ADMIN — Medication 650 MILLIGRAM(S): at 22:39

## 2020-03-17 NOTE — ED ADULT NURSE NOTE - OBJECTIVE STATEMENT
patient received a&ox4 states that she has right sided breast pain which started yesterday, patient also states that she has shortness of breath and feels like she is unable to catch her breath, patient does not appear to be in any respiratory distress at this time. respirations even and unlabored. patient denies any complaints of chest pain

## 2020-03-17 NOTE — ED PROVIDER NOTE - PROGRESS NOTE DETAILS
Labs as noted.  CXR BART.  Improving with meds and stable for d/c with f/u as outpt.  Rx Anaprox DS and Percocet

## 2020-03-17 NOTE — ED ADULT NURSE NOTE - CADM POA PRESS ULCER
[FreeTextEntry8] : Ms. KEESHA MULTANI is a 42 year female here for PPD placement. Patient feels well and has no complaint at this time. 
No

## 2020-03-17 NOTE — ED PROVIDER NOTE - OBJECTIVE STATEMENT
35 y/o F pt with no significant PMHx presents to the ED c/o sudden onset right-sided CP that began worsening over the last hour. Pt states that the pain radiates into her right armpit and is aggravated by deep inhalation and movement. Currently on contraceptives. No cardiac FHx. Non-Smoker. NKDA. Denies any abdominal pain or cough. No further complaints at this time. 33 y/o F pt with no significant PMHx presents to the ED c/o sudden onset right-sided CP that began worsening over the last hour. Pt states that the pain radiates into her right armpit and is aggravated by deep inhalation and movement. Currently on contraceptives. No cardiac FHx. Non-Smoker. NKDA. Denies any extraneous activity, abdominal pain or cough. No further complaints at this time.

## 2020-03-17 NOTE — ED ADULT TRIAGE NOTE - CHIEF COMPLAINT QUOTE
patient states that she has right sided breast pain which worsens when she takes a deep breath in. patient states that she feels like she in unable to take a deep breath in or catch my breath. patient denies any trauma

## 2020-03-17 NOTE — ED PROVIDER NOTE - PATIENT PORTAL LINK FT
You can access the FollowMyHealth Patient Portal offered by [facility] by registering at the following website: [email or web address]. By joining [employer]’s FollowMyHealth portal, you will also be able to view your health information using other applications (apps) compatible with our system.

## 2020-04-26 ENCOUNTER — MESSAGE (OUTPATIENT)
Age: 35
End: 2020-04-26

## 2021-01-06 NOTE — ED ADULT NURSE NOTE - CAS DISCH ACCOMP BY
Telehealth outside of [address] Verbal Consent     I conducted a telehealth visit with Dana Chew today, 01/06/21, which was completed using two-way, real-time interactive audio and video communication.  This has been done in [place] to mendez trauma. The problem occurs daily. The problem has been gradually improving. The pain is mild. Pertinent negatives include no fever, inability to bear weight, itching, joint locking, joint swelling, limited range of motion, numbness, stiffness or tingling. affect. Her speech is normal and behavior is normal.         Assessment & Plan    1. Left shoulder pain, unspecified chronicity    2. Pleurisy    3.  COVID-19 virus detected  Plan  Started to have left shoulder pain after ddx of covid, also sleeps on left s reasons:     The valid total cholesterol range is 130 to 320 mg/dL    Medical History    Reviewed Active Problems:  Patient Active Problem List    Hypertension goal BP (blood pressure) < 130/80      Well controlled diabetes mellitus ([address]      Hypothyroidis apply Misc Test once daily 100 each 3            Patient advised to call office with any questions or seek emergency care if necessary. Patient understands and agrees to follow directions and advice.       Shu Espinoza MD  Internal Medicine    ---------- Self

## 2021-01-26 NOTE — ED PROVIDER NOTE - OBJECTIVE STATEMENT
This patient is a 33 year old woman who presents to the ER c/o left sided chest pain that began around 6pm.  Patient states that she was driving when the pain began.  She describes the pain as left sided chest pressure radiating to the left arm.  The pain was 8/10 in severity but resolved while waiting in the ER.
Introduction Text (Please End With A Colon): The following procedure was deferred:
Detail Level: Simple
Procedure To Be Performed At Next Visit: Biopsy by shave method

## 2021-05-04 ENCOUNTER — EMERGENCY (EMERGENCY)
Facility: HOSPITAL | Age: 36
LOS: 1 days | Discharge: HOME | End: 2021-05-04
Payer: COMMERCIAL

## 2021-05-04 VITALS
DIASTOLIC BLOOD PRESSURE: 87 MMHG | HEIGHT: 61 IN | OXYGEN SATURATION: 98 % | HEART RATE: 93 BPM | TEMPERATURE: 98 F | SYSTOLIC BLOOD PRESSURE: 124 MMHG | RESPIRATION RATE: 17 BRPM

## 2021-05-04 PROCEDURE — 99282 EMERGENCY DEPT VISIT SF MDM: CPT

## 2021-05-04 PROCEDURE — 99283 EMERGENCY DEPT VISIT LOW MDM: CPT

## 2021-05-04 PROCEDURE — U0005: CPT

## 2021-05-04 PROCEDURE — U0003: CPT

## 2021-05-04 NOTE — ED PROVIDER NOTE - PATIENT PORTAL LINK FT
You can access the FollowMyHealth Patient Portal offered by [facility] by registering at the following website: [email or web address]. By joining [employer]’s FollowMyHealth portal, you will also be able to view your health information using other applications (apps) compatible with our system.

## 2021-05-05 LAB — SARS-COV-2 RNA SPEC QL NAA+PROBE: SIGNIFICANT CHANGE UP

## 2021-08-05 ENCOUNTER — OUTPATIENT (OUTPATIENT)
Dept: OUTPATIENT SERVICES | Facility: HOSPITAL | Age: 36
LOS: 1 days | End: 2021-08-05
Payer: COMMERCIAL

## 2021-08-05 DIAGNOSIS — R07.9 CHEST PAIN, UNSPECIFIED: ICD-10-CM

## 2021-08-05 PROCEDURE — 73030 X-RAY EXAM OF SHOULDER: CPT

## 2021-08-05 PROCEDURE — 73030 X-RAY EXAM OF SHOULDER: CPT | Mod: 26,LT

## 2021-08-05 PROCEDURE — 71045 X-RAY EXAM CHEST 1 VIEW: CPT | Mod: 26

## 2021-08-05 PROCEDURE — 71045 X-RAY EXAM CHEST 1 VIEW: CPT

## 2021-10-04 ENCOUNTER — EMERGENCY (EMERGENCY)
Facility: HOSPITAL | Age: 36
LOS: 1 days | Discharge: HOME | End: 2021-10-04
Attending: EMERGENCY MEDICINE
Payer: COMMERCIAL

## 2021-10-04 VITALS
OXYGEN SATURATION: 98 % | WEIGHT: 169.98 LBS | RESPIRATION RATE: 16 BRPM | HEIGHT: 61 IN | HEART RATE: 94 BPM | SYSTOLIC BLOOD PRESSURE: 131 MMHG | DIASTOLIC BLOOD PRESSURE: 78 MMHG | TEMPERATURE: 98 F

## 2021-10-04 PROCEDURE — 96375 TX/PRO/DX INJ NEW DRUG ADDON: CPT

## 2021-10-04 PROCEDURE — 99284 EMERGENCY DEPT VISIT MOD MDM: CPT | Mod: 25

## 2021-10-04 PROCEDURE — 96374 THER/PROPH/DIAG INJ IV PUSH: CPT

## 2021-10-04 PROCEDURE — 99284 EMERGENCY DEPT VISIT MOD MDM: CPT

## 2021-10-04 RX ORDER — DIAZEPAM 5 MG
5 TABLET ORAL ONCE
Refills: 0 | Status: DISCONTINUED | OUTPATIENT
Start: 2021-10-04 | End: 2021-10-04

## 2021-10-04 RX ORDER — LIDOCAINE 4 G/100G
1 CREAM TOPICAL
Qty: 7 | Refills: 0
Start: 2021-10-04 | End: 2021-10-10

## 2021-10-04 RX ORDER — LIDOCAINE 4 G/100G
1 CREAM TOPICAL ONCE
Refills: 0 | Status: COMPLETED | OUTPATIENT
Start: 2021-10-04 | End: 2021-10-04

## 2021-10-04 RX ORDER — METHYLPREDNISOLONE 4 MG/1
4 TABLET ORAL
Qty: 1 | Refills: 0 | Status: ACTIVE | COMMUNITY
Start: 2021-10-04 | End: 1900-01-01

## 2021-10-04 RX ORDER — MORPHINE SULFATE 50 MG/1
4 CAPSULE, EXTENDED RELEASE ORAL ONCE
Refills: 0 | Status: DISCONTINUED | OUTPATIENT
Start: 2021-10-04 | End: 2021-10-04

## 2021-10-04 RX ORDER — KETOROLAC TROMETHAMINE 10 MG/1
10 TABLET, FILM COATED ORAL 3 TIMES DAILY
Qty: 21 | Refills: 0 | Status: ACTIVE | COMMUNITY
Start: 2021-10-04 | End: 1900-01-01

## 2021-10-04 RX ORDER — OXYCODONE AND ACETAMINOPHEN 5; 325 MG/1; MG/1
1 TABLET ORAL
Qty: 9 | Refills: 0
Start: 2021-10-04 | End: 2021-10-06

## 2021-10-04 RX ADMIN — Medication 5 MILLIGRAM(S): at 19:29

## 2021-10-04 RX ADMIN — MORPHINE SULFATE 4 MILLIGRAM(S): 50 CAPSULE, EXTENDED RELEASE ORAL at 21:14

## 2021-10-04 RX ADMIN — LIDOCAINE 1 PATCH: 4 CREAM TOPICAL at 21:13

## 2021-10-04 NOTE — ED PROVIDER NOTE - OBJECTIVE STATEMENT
Pt. present to the ED c/o low back spasm/pain. Pt. has been experiencing some pain to her lower back for the past 2 weeks. No trauma. 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 Afterwords, Pt. experienced tightness to her lower back. Pt. was given a combination shot of lidocaine/steroids/toradol IM about 1 hour Prior to coming to the ED. Pt. states that the pain is very low if she is not moving. Pain is much worst with leg and body movement. NO bowel/bladder incontinence.

## 2021-10-04 NOTE — ED PROVIDER NOTE - PROGRESS NOTE DETAILS
Pt reports moderate relief of Rt lumbar pain, states it is manageable after receiving Valium IVP. Pt demonstrating increased Rt knee flexion at this time & able to independently sit at the edge of the bed. When trying to bear weight on RLE & stand up, pt reports return of pain. Will order Lidoderm patch & morphine & reassess. Pt agreeable to stay overnight for observation if unable to ambulate after further pain control. Pt able to get herself off the stretcher & ambulate with steady gait. States that pain has improved a lot & is manageable at this time. Pt comfortable with being d/c home. Discussed plan for rest & outpatient orthopedics f/u for further workup.

## 2021-10-04 NOTE — ED PROVIDER NOTE - PATIENT PORTAL LINK FT
You can access the FollowMyHealth Patient Portal offered by [facility] by registering at the following website: [email or web address]. By joining [employer]’s FollowMyHealth portal, you will also be able to view your health information using other applications (apps) compatible with our system.

## 2021-10-04 NOTE — ED PROVIDER NOTE - CLINICAL SUMMARY MEDICAL DECISION MAKING FREE TEXT BOX
PT. present to ED with Right lumbar pain. Pain worst with leg or body movement. Pt. with no focal deficit. Pt. will be medicated for her pain and to be re-evaluated.

## 2021-10-04 NOTE — ED ADULT NURSE NOTE - OBJECTIVE STATEMENT
patient with complaints of low back spasm/pain. Pt. has been experiencing some pain to her lower back for the past 2 weeks. 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 Afterwords, Pt. states that the pain is very low if she is not moving.

## 2021-10-04 NOTE — ED ADULT TRIAGE NOTE - CHIEF COMPLAINT QUOTE
Pt brought into the ED from another unit in the hospital, was working in the OR, wearing lead. Pt reports feeling a back spasm in her lower back causing to her to not be able to walk. Pt able to move all extremities. Denies numbness or tingling. Pt rec'd 30mg IM Toradol and 50mg IM Methylprednisolone prior to arriving to ED. Pt brought into the ED from another unit in the hospital, was working in the OR, wearing lead for approx 3 hours while standing. Pt reports feeling a back spasm in her lower back causing to her to not be able to walk. Denies any previous back injuries. Pt able to move all extremities. Denies numbness or tingling. Pt rec'd 30mg IM Toradol and 50mg IM Methylprednisolone prior to arriving to ED.

## 2021-10-04 NOTE — ED PROVIDER NOTE - CARE PROVIDER_API CALL
Eusebio Santana (DO)  Orthopaedic Surgery  [address] [place] [address], [address]  [place] [address]  Phone: [phone]  Fax: [phone]  Follow Up Time:

## 2021-10-04 NOTE — ED PROVIDER NOTE - ATTENDING CONTRIBUTION TO CARE
I, Dr. Diamond, performed a face to face bedside interview with this patient regarding history of present illness, review of symptoms and relevant past medical, social and family history.  I completed an independent physical examination.  I have also reviewed the student's note(s) and discussed the plan with the student.

## 2021-10-05 ENCOUNTER — APPOINTMENT (OUTPATIENT)
Dept: ORTHOPEDIC SURGERY | Facility: CLINIC | Age: 36
End: 2021-10-05
Payer: COMMERCIAL

## 2021-10-05 VITALS
WEIGHT: 170 LBS | HEIGHT: 61 IN | DIASTOLIC BLOOD PRESSURE: 90 MMHG | SYSTOLIC BLOOD PRESSURE: 138 MMHG | BODY MASS INDEX: 32.1 KG/M2 | HEART RATE: 95 BPM

## 2021-10-05 PROCEDURE — 99203 OFFICE O/P NEW LOW 30 MIN: CPT

## 2021-10-05 PROCEDURE — 72100 X-RAY EXAM L-S SPINE 2/3 VWS: CPT

## 2021-10-05 RX ORDER — KETOROLAC TROMETHAMINE 10 MG/1
10 TABLET, FILM COATED ORAL 3 TIMES DAILY
Qty: 15 | Refills: 0 | Status: ACTIVE | COMMUNITY
Start: 2021-10-05 | End: 1900-01-01

## 2021-10-05 RX ORDER — TIZANIDINE 4 MG/1
4 TABLET ORAL EVERY 8 HOURS
Qty: 20 | Refills: 0 | Status: ACTIVE | COMMUNITY
Start: 2021-10-05 | End: 1900-01-01

## 2021-10-10 ENCOUNTER — EMERGENCY (EMERGENCY)
Facility: HOSPITAL | Age: 36
LOS: 1 days | Discharge: HOME | End: 2021-10-10
Attending: EMERGENCY MEDICINE
Payer: COMMERCIAL

## 2021-10-10 VITALS
TEMPERATURE: 98 F | WEIGHT: 169.98 LBS | HEIGHT: 61 IN | OXYGEN SATURATION: 95 % | DIASTOLIC BLOOD PRESSURE: 93 MMHG | RESPIRATION RATE: 20 BRPM | HEART RATE: 104 BPM | SYSTOLIC BLOOD PRESSURE: 134 MMHG

## 2021-10-10 PROCEDURE — 99284 EMERGENCY DEPT VISIT MOD MDM: CPT

## 2021-10-10 PROCEDURE — 96372 THER/PROPH/DIAG INJ SC/IM: CPT

## 2021-10-10 PROCEDURE — 99283 EMERGENCY DEPT VISIT LOW MDM: CPT | Mod: 25

## 2021-10-10 RX ORDER — KETOROLAC TROMETHAMINE 30 MG/ML
60 SYRINGE (ML) INJECTION ONCE
Refills: 0 | Status: DISCONTINUED | OUTPATIENT
Start: 2021-10-10 | End: 2021-10-10

## 2021-10-10 RX ORDER — DIAZEPAM 5 MG
5 TABLET ORAL ONCE
Refills: 0 | Status: DISCONTINUED | OUTPATIENT
Start: 2021-10-10 | End: 2021-10-10

## 2021-10-10 RX ORDER — LIDOCAINE 4 G/100G
1 CREAM TOPICAL ONCE
Refills: 0 | Status: COMPLETED | OUTPATIENT
Start: 2021-10-10 | End: 2021-10-10

## 2021-10-10 RX ORDER — DIAZEPAM 5 MG
1 TABLET ORAL
Qty: 3 | Refills: 0
Start: 2021-10-10 | End: 2021-10-12

## 2021-10-10 RX ADMIN — Medication 5 MILLIGRAM(S): at 11:54

## 2021-10-10 RX ADMIN — Medication 60 MILLIGRAM(S): at 11:57

## 2021-10-10 RX ADMIN — LIDOCAINE 1 PATCH: 4 CREAM TOPICAL at 11:57

## 2021-10-10 NOTE — ED PROVIDER NOTE - ATTENDING CONTRIBUTION TO CARE
35 y/o F presents with right sided neck pain since waking up this morning, unable to turn head due to pain. She has been taking tizanidine for low back spasm, which did not help.    AP - NAD, CN II-XII symmetrically intact, no midline TTP, + cervical paraspinal TTP with hypertonicity, sensation intact bilateral upper extremities, 5/5  strength, 2+ symmetrical pulses.    pain control, follow up with ortho

## 2021-10-10 NOTE — ED PROVIDER NOTE - OBJECTIVE STATEMENT
37 y/o F c/o right sided neck pain which started this morning when she woke up.  Pain is 5/10 in severity and worse when she turns her head.  Denies headache, trauma, or fever.

## 2021-10-10 NOTE — ED PROVIDER NOTE - PATIENT PORTAL LINK FT
You can access the FollowMyHealth Patient Portal offered by [facility] by registering at the following website: [email or web address]. By joining [employer]’s FollowMyHealth portal, you will also be able to view your health information using other applications (apps) compatible with our system.

## 2021-10-10 NOTE — ED ADULT TRIAGE NOTE - CHIEF COMPLAINT QUOTE
" On Monday at work I started having back pain which resolved, then I started having neck soreness, this morning I was unable to get out of bed due to neck spasm"

## 2021-10-13 NOTE — HISTORY OF PRESENT ILLNESS
[de-identified] : 35 year old F presents for an initial evaluation of lower back pain that began when she was putting her son down, the pain was intense but improved after that day and became bearable. She states that she presented to work with some mild pain and believes dawning the leads caused the pain to be exacerbated again. Exacerbating factors include bending forward or leaning back. She has been to the ED and was given Morphine and muscle relaxants which helped greatly, she was also given a Medrol pack. She had Toradol and injections under Dr. Pizano.  [Ataxia] : no ataxia [Incontinence] : no incontinence [Loss of Dexterity] : good dexterity [Urinary Ret.] : no urinary retention

## 2021-10-13 NOTE — PHYSICAL EXAM
[Obese] : obese [Poor Appearance] : well-appearing [Acute Distress] : not in acute distress [de-identified] : CONSTITUTIONAL: The patient is a very pleasant individual who is well-nourished and who appears stated age.\par PSYCHIATRIC: The patient is alert and oriented X 3 and in no apparent distress, and participates with orthopedic evaluation well.\par HEAD: Atraumatic and is nonsyndromic in appearance.\par EENT: No visible thyromegaly, EOMI.\par RESPIRATORY: Respiratory rate is regular, not dyspneic on examination.\par LYMPHATICS: There is no inguinal lymphadenopathy\par INTEGUMENTARY: Skin is clean, dry, and intact about the bilateral lower extremities and lumbar spine.\par VASCULAR: There is brisk capillary refill about the bilateral lower extremities.\par NEUROLOGIC: There are no pathologic reflexes. There is no decrease in sensation of the bilateral lower extremities on Wartenberg pinwheel examination. Deep tendon reflexes are well maintained at 2+/4 of the bilateral lower extremities and are symmetric.\par MUSCULOSKELETAL: There is no visible muscular atrophy. Manual motor strength is well maintained in the bilateral lower extremities. Range of motion of lumbar spine is well maintained. The patient ambulates in a non-myelopathic manner. Negative tension sign and straight leg raise bilaterally. Quad extension, ankle dorsiflexion, EHL, plantar flexion, and ankle eversion are well preserved. Normal secondary orthopaedic exam of bilateral hips, greater trochanteric area, knees and ankles. No pain with internal or external rotation of the bilateral hips. Muscle sprain and strain characteristics. \par \par At this time we can not rule out an annular tear.  [de-identified] : Xray of the Lumbar spine taken 10/05/2021 : AP projection shows pedicles are well visualized L1-L5, no rotoscoliosis, lateral projections show well maintained lumbar lordosis with no acute processes noted.

## 2021-10-13 NOTE — ADDENDUM
[FreeTextEntry1] : Documented by Juan Vitale acting as a scribe for Dr. Eusebio Santana on 10/05/2021. All medical record entries made by the Scribe were at my, Dr. Eusebio Santana, direction and personally dictated by me on 10/05/2021 . I have reviewed the chart and agree that the record accurately reflects my personal performance of the history, physical exam, assessment and plan. I have also personally directed, reviewed, and agreed with the chart.

## 2021-10-13 NOTE — DISCUSSION/SUMMARY
[de-identified] : We talked about the nature of the condition and treatment options. Anticipatory guidance regarding annular tears was given as well as muscle tears. Patient was provided Toradol to be taken 10mg BID. Patient was prescribed Tizanidine 4mg BID. We also spoke about the benefits of using heat, application of ice to the area 2-3x daily for 20 minutes after periods of activity, and Bengay cream. The patient will follow up in 4 - 6 weeks for a repeat clinical assessment If pain persists at this point we will consider ordering an MRI to further assess these complaints. \par \par Prior to appointment and during encounter with patient extensive medical records were reviewed including but not limited to, hospital records, out patient records, imaging results, and lab data. During this appointment the patient was examined, diagnoses were discussed and explained in a face to face manner. In addition extensive time was spent reviewing aforementioned diagnostic studies. Counseling including abnormal image results, differential diagnoses, treatment options, risk and benefits, lifestyle changes, current condition, and current medications was performed. Patient's comments, questions, and concerns were address and patient verbalized understanding. Based on this patient's presentation at our office, which is an orthopedic spine surgeon's office, this patient inherently / intrinsically has a risk, however minute, of developing  issues such as Cauda equina syndrome, bowel and bladder changes, or progression of motor or neurological deficits such as paralysis which may be permanent. \par \par ADDENDUM 10/13/21 patient called over the weekend, I was not on call and not in the office until now.  On mariana 10/10/21 she called stating her back pain has worsened, she can't walk and pain is radiating to neck.  Due to subjective leg weakness i advised repeat medrol sarina, muscle relaxers and follow in office with our NP tomorrow, start PT ASAP.   she took herself to the ED at [facility] for medication refill and follow up exam, was treated and released.  I have ordered MRI based on increased pain despite care under ortho spine practice, NSAIDS, muscle relaxers and medrol sarina.  we will not be commenting on work status.

## 2021-10-23 ENCOUNTER — APPOINTMENT (OUTPATIENT)
Dept: MRI IMAGING | Facility: CLINIC | Age: 36
End: 2021-10-23
Payer: COMMERCIAL

## 2021-10-23 ENCOUNTER — OUTPATIENT (OUTPATIENT)
Dept: OUTPATIENT SERVICES | Facility: HOSPITAL | Age: 36
LOS: 1 days | End: 2021-10-23

## 2021-10-23 DIAGNOSIS — M54.41 LUMBAGO WITH SCIATICA, RIGHT SIDE: ICD-10-CM

## 2021-10-23 PROCEDURE — 72148 MRI LUMBAR SPINE W/O DYE: CPT | Mod: 26

## 2021-10-26 ENCOUNTER — APPOINTMENT (OUTPATIENT)
Dept: ORTHOPEDIC SURGERY | Facility: CLINIC | Age: 36
End: 2021-10-26
Payer: COMMERCIAL

## 2021-10-26 VITALS
SYSTOLIC BLOOD PRESSURE: 127 MMHG | DIASTOLIC BLOOD PRESSURE: 82 MMHG | HEIGHT: 61 IN | BODY MASS INDEX: 32.1 KG/M2 | WEIGHT: 170 LBS | HEART RATE: 96 BPM

## 2021-10-26 DIAGNOSIS — R11.0 NAUSEA: ICD-10-CM

## 2021-10-26 DIAGNOSIS — M54.41 LUMBAGO WITH SCIATICA, RIGHT SIDE: ICD-10-CM

## 2021-10-26 DIAGNOSIS — M06.9 RHEUMATOID ARTHRITIS, UNSPECIFIED: ICD-10-CM

## 2021-10-26 PROCEDURE — 99215 OFFICE O/P EST HI 40 MIN: CPT

## 2021-10-28 PROBLEM — R11.0 NAUSEA: Status: ACTIVE | Noted: 2021-10-28

## 2021-10-28 RX ORDER — TRAMADOL HYDROCHLORIDE 50 MG/1
50 TABLET, COATED ORAL
Qty: 42 | Refills: 0 | Status: ACTIVE | COMMUNITY
Start: 2021-10-28 | End: 1900-01-01

## 2021-10-28 RX ORDER — PROMETHAZINE HYDROCHLORIDE 12.5 MG/1
12.5 TABLET ORAL
Qty: 30 | Refills: 0 | Status: ACTIVE | COMMUNITY
Start: 2021-10-28 | End: 1900-01-01

## 2021-10-28 NOTE — PHYSICAL EXAM
[Obese] : obese [Poor Appearance] : well-appearing [Acute Distress] : not in acute distress [de-identified] : CONSTITUTIONAL: The patient is a very pleasant individual who is well-nourished and who appears stated age.\par PSYCHIATRIC: The patient is alert and oriented X 3 and in no apparent distress, and participates with orthopedic evaluation well.\par HEAD: Atraumatic and is nonsyndromic in appearance.\par EENT: No visible thyromegaly, EOMI.\par RESPIRATORY: Respiratory rate is regular, not dyspneic on examination.\par LYMPHATICS: There is no inguinal lymphadenopathy\par INTEGUMENTARY: Skin is clean, dry, and intact about the bilateral lower extremities and lumbar spine.\par VASCULAR: There is brisk capillary refill about the bilateral lower extremities.\par NEUROLOGIC: There are no pathologic reflexes. There is no decrease in sensation of the bilateral lower extremities on Wartenberg pinwheel examination. Deep tendon reflexes are well maintained at 2+/4 of the bilateral lower extremities and are symmetric.\par MUSCULOSKELETAL: There is no visible muscular atrophy. Manual motor strength is well maintained in the bilateral lower extremities. Range of motion of lumbar spine is well maintained. The patient ambulates in a non-myelopathic manner. Negative tension sign and straight leg raise bilaterally. Quad extension, ankle dorsiflexion, EHL, plantar flexion, and ankle eversion are well preserved. Normal secondary orthopaedic exam of bilateral hips, greater trochanteric area, knees and ankles. No pain with internal or external rotation of the bilateral hips. Muscle sprain and strain characteristics. pain with palpation to the cervical and lumbar spine.  [de-identified] : Xray of the Lumbar spine taken 10/05/2021 : AP projection shows pedicles are well visualized L1-L5, no rotoscoliosis, lateral projections show well maintained lumbar lordosis with no acute processes noted. \par \par MRI of the lumbar spine taken at [facility] on 10- demonstrates no significant compressive lesions, there are multiple osseous lesions in the lumbar spine possibly consistent with metastatic disease. \par \par MRI of the cervical spine taken at an outside facility demonstrates a C3 compression fracture, no canal stenosis, no epidural involvement.

## 2021-10-28 NOTE — HISTORY OF PRESENT ILLNESS
[de-identified] : 36 year old F presents for interpretation of recent MRI results. Patient continues to complain of lower back pain that began when she was putting her son down, the pain was intense but improved after that day and became bearable. She states that she presented to work with some mild pain and believes dawning the leads caused the pain to be exacerbated again. Exacerbating factors include bending forward or leaning back. She has been to the ED and was given Morphine and muscle relaxants which helped greatly, she was also given a Medrol pack. She had Toradol and injections under Dr. Pizano. After her assessment patient called the office and said pain had become intense and radiated to her neck, she presented to [facility] ED and was released with Medrol pack, muscle relaxants, and antiinflammatories. She has been to PT for the last 3 weeks and saw a pain management physician who ordered a cervical MRI.  [Ataxia] : no ataxia [Incontinence] : no incontinence [Loss of Dexterity] : good dexterity [Urinary Ret.] : no urinary retention

## 2021-10-28 NOTE — DISCUSSION/SUMMARY
[de-identified] : We talked about the nature of the condition and treatment options. Anticipatory guidance regarding multiple osseous lesions was given. Patient was referred to family care provider Dr. Huy Tiwari for coordination of care and definitive diagnosis of this neoplastic process. This is considered urgent and Dr. Tiwari, an affiliate of [place] [facility], has accepted her case. If her blood work is normal we will entertain a bone biopsy. A thoracic MRI has been ordered and is medically necessary due to persistent pain and to further assess the osseous lesions, I also wish to further assess this thoracic lesion and the bulging of the upper thoracic vertebra. MRI will help guide treatment plan, possible surgical intervention vs injection therapy with pain management.  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 The patient will follow up in 6 weeks for a repeat clinical assessment at which time we will order repeat MRI images. \par \par Prior to appointment and during encounter with patient extensive medical records were reviewed including but not limited to, hospital records, out patient records, imaging results, and lab data. During this appointment the patient was examined, diagnoses were discussed and explained in a face to face manner. In addition extensive time was spent reviewing aforementioned diagnostic studies. Counseling including abnormal image results, differential diagnoses, treatment options, risk and benefits, lifestyle changes, current condition, and current medications was performed. Patient's comments, questions, and concerns were address and patient verbalized understanding. Based on this patient's presentation at our office, which is an orthopedic spine surgeon's office, this patient inherently / intrinsically has a risk, however minute, of developing  issues such as Cauda equina syndrome, bowel and bladder changes, or progression of motor or neurological deficits such as paralysis which may be permanent. \par \par Patient with multiple medical comorbidity increasing the risk of perioperative and postoperative complications as well as diminished spine outcomes as per the current medical literature.  These include but are not limited to obesity, anxiety/depression, cardiac illness, kidney disease, peripheral vascular disease, history of cancer, COPD, dysmetabolic syndrome including but not limited to diabetes, hyperlipidemia, hypertension.  Patient is being referred back to primary care provider for medical optimization, as well as other appropriate specialists as needed for optimization prior to spine surgery. \par \par Addendum; October 20, 2021 patient called stating that she has crease pain in the neck, mild weakness of the arms but no ataxia no decrease in fine motor skills at this time. She states she cannot tolerate oxycodone given by her pain management physician and makes her nauseous. I have prescribed tramadol 50 mg she can takeone tablet every 4 hours as needed, can add Tylenol. I prescribed Phenergan 12.5 mg can be taken q.i.d. p.r.n. nausea. I prescribed a cervical collar which is medically necessary for cervical compression fracture, for comfort and for stabilization of her spine.lumbar CT is medically necessary for upcoming surgical planning, measurement of surgical implants for fusion upcoming, to ensure there are no anatomic anomalies that would preclude posterior approach Cervical thoracic and lumbar ordered to delineate stability ofspine and will possibly be used for surgical planning.

## 2021-10-28 NOTE — ADDENDUM
[FreeTextEntry1] : Documented by Juan Vitale acting as a scribe for Kaity Echols on 10/26/2021 . All medical record entries made by the Scribe were at my, Kaity Echols , direction and personally dictated by me on 10/26/2021  . I have reviewed the chart and agree that the record accurately reflects my personal performance of the history, physical exam, assessment and plan. I have also personally directed, reviewed, and agreed with the chart.

## 2021-11-03 ENCOUNTER — OUTPATIENT (OUTPATIENT)
Dept: OUTPATIENT SERVICES | Facility: HOSPITAL | Age: 36
LOS: 1 days | End: 2021-11-03

## 2021-11-03 ENCOUNTER — APPOINTMENT (OUTPATIENT)
Dept: MRI IMAGING | Facility: CLINIC | Age: 36
End: 2021-11-03
Payer: COMMERCIAL

## 2021-11-03 ENCOUNTER — APPOINTMENT (OUTPATIENT)
Dept: CT IMAGING | Facility: CLINIC | Age: 36
End: 2021-11-03
Payer: COMMERCIAL

## 2021-11-03 DIAGNOSIS — D49.89 NEOPLASM OF UNSPECIFIED BEHAVIOR OF OTHER SPECIFIED SITES: ICD-10-CM

## 2021-11-03 PROCEDURE — 72146 MRI CHEST SPINE W/O DYE: CPT | Mod: 26

## 2021-11-03 PROCEDURE — 71260 CT THORAX DX C+: CPT | Mod: 26

## 2021-11-03 PROCEDURE — 74177 CT ABD & PELVIS W/CONTRAST: CPT | Mod: 26

## 2021-11-03 PROCEDURE — 72128 CT CHEST SPINE W/O DYE: CPT | Mod: 26

## 2021-11-03 PROCEDURE — 70491 CT SOFT TISSUE NECK W/DYE: CPT | Mod: 26

## 2021-11-03 PROCEDURE — 72131 CT LUMBAR SPINE W/O DYE: CPT | Mod: 26

## 2021-11-03 PROCEDURE — 72125 CT NECK SPINE W/O DYE: CPT | Mod: 26

## 2021-11-06 ENCOUNTER — APPOINTMENT (OUTPATIENT)
Dept: ORTHOPEDIC SURGERY | Facility: CLINIC | Age: 36
End: 2021-11-06
Payer: COMMERCIAL

## 2021-11-06 VITALS
BODY MASS INDEX: 32.1 KG/M2 | HEART RATE: 120 BPM | SYSTOLIC BLOOD PRESSURE: 145 MMHG | HEIGHT: 61 IN | DIASTOLIC BLOOD PRESSURE: 92 MMHG | WEIGHT: 170 LBS

## 2021-11-06 PROCEDURE — 99215 OFFICE O/P EST HI 40 MIN: CPT

## 2021-11-06 RX ORDER — METHOCARBAMOL 500 MG/1
500 TABLET, FILM COATED ORAL 3 TIMES DAILY
Qty: 90 | Refills: 2 | Status: ACTIVE | COMMUNITY
Start: 2021-11-06 | End: 1900-01-01

## 2021-11-06 RX ORDER — TRAMADOL HYDROCHLORIDE 50 MG/1
50 TABLET, COATED ORAL
Qty: 42 | Refills: 0 | Status: ACTIVE | COMMUNITY
Start: 2021-11-06 | End: 1900-01-01

## 2021-11-06 RX ORDER — ALPRAZOLAM 0.25 MG/1
0.25 TABLET ORAL 3 TIMES DAILY
Qty: 30 | Refills: 0 | Status: ACTIVE | COMMUNITY
Start: 2021-11-06 | End: 1900-01-01

## 2021-11-08 ENCOUNTER — APPOINTMENT (OUTPATIENT)
Dept: NUCLEAR MEDICINE | Facility: CLINIC | Age: 36
End: 2021-11-08
Payer: COMMERCIAL

## 2021-11-08 ENCOUNTER — OUTPATIENT (OUTPATIENT)
Dept: OUTPATIENT SERVICES | Facility: HOSPITAL | Age: 36
LOS: 1 days | End: 2021-11-08

## 2021-11-08 DIAGNOSIS — Z00.00 ENCOUNTER FOR GENERAL ADULT MEDICAL EXAMINATION WITHOUT ABNORMAL FINDINGS: ICD-10-CM

## 2021-11-08 PROCEDURE — 78306 BONE IMAGING WHOLE BODY: CPT | Mod: 26

## 2021-11-09 ENCOUNTER — OUTPATIENT (OUTPATIENT)
Dept: OUTPATIENT SERVICES | Facility: HOSPITAL | Age: 36
LOS: 1 days | End: 2021-11-09
Payer: COMMERCIAL

## 2021-11-09 DIAGNOSIS — Z01.812 ENCOUNTER FOR PREPROCEDURAL LABORATORY EXAMINATION: ICD-10-CM

## 2021-11-09 LAB
ALBUMIN SERPL ELPH-MCNC: 4.3 G/DL — SIGNIFICANT CHANGE UP (ref 3.3–5.2)
ALP SERPL-CCNC: 226 U/L — HIGH (ref 40–120)
ALT FLD-CCNC: 28 U/L — SIGNIFICANT CHANGE UP
ANION GAP SERPL CALC-SCNC: 16 MMOL/L — SIGNIFICANT CHANGE UP (ref 5–17)
APTT BLD: 34.7 SEC — SIGNIFICANT CHANGE UP (ref 27.5–35.5)
AST SERPL-CCNC: 48 U/L — HIGH
BASOPHILS # BLD AUTO: 0.03 K/UL — SIGNIFICANT CHANGE UP (ref 0–0.2)
BASOPHILS NFR BLD AUTO: 0.3 % — SIGNIFICANT CHANGE UP (ref 0–2)
BILIRUB SERPL-MCNC: 0.3 MG/DL — LOW (ref 0.4–2)
BLD GP AB SCN SERPL QL: SIGNIFICANT CHANGE UP
BUN SERPL-MCNC: 14.2 MG/DL — SIGNIFICANT CHANGE UP (ref 8–20)
CALCIUM SERPL-MCNC: 9.8 MG/DL — SIGNIFICANT CHANGE UP (ref 8.6–10.2)
CHLORIDE SERPL-SCNC: 99 MMOL/L — SIGNIFICANT CHANGE UP (ref 98–107)
CO2 SERPL-SCNC: 21 MMOL/L — LOW (ref 22–29)
CREAT SERPL-MCNC: 0.72 MG/DL — SIGNIFICANT CHANGE UP (ref 0.5–1.3)
EOSINOPHIL # BLD AUTO: 0.24 K/UL — SIGNIFICANT CHANGE UP (ref 0–0.5)
EOSINOPHIL NFR BLD AUTO: 2.3 % — SIGNIFICANT CHANGE UP (ref 0–6)
GLUCOSE SERPL-MCNC: 90 MG/DL — SIGNIFICANT CHANGE UP (ref 70–99)
HCT VFR BLD CALC: 42 % — SIGNIFICANT CHANGE UP (ref 34.5–45)
HGB BLD-MCNC: 14.1 G/DL — SIGNIFICANT CHANGE UP (ref 11.5–15.5)
IMM GRANULOCYTES NFR BLD AUTO: 0.8 % — SIGNIFICANT CHANGE UP (ref 0–1.5)
INR BLD: 1.08 RATIO — SIGNIFICANT CHANGE UP (ref 0.88–1.16)
LYMPHOCYTES # BLD AUTO: 2.5 K/UL — SIGNIFICANT CHANGE UP (ref 1–3.3)
LYMPHOCYTES # BLD AUTO: 24.3 % — SIGNIFICANT CHANGE UP (ref 13–44)
MCHC RBC-ENTMCNC: 28.9 PG — SIGNIFICANT CHANGE UP (ref 27–34)
MCHC RBC-ENTMCNC: 33.6 GM/DL — SIGNIFICANT CHANGE UP (ref 32–36)
MCV RBC AUTO: 86.1 FL — SIGNIFICANT CHANGE UP (ref 80–100)
MONOCYTES # BLD AUTO: 0.73 K/UL — SIGNIFICANT CHANGE UP (ref 0–0.9)
MONOCYTES NFR BLD AUTO: 7.1 % — SIGNIFICANT CHANGE UP (ref 2–14)
NEUTROPHILS # BLD AUTO: 6.71 K/UL — SIGNIFICANT CHANGE UP (ref 1.8–7.4)
NEUTROPHILS NFR BLD AUTO: 65.2 % — SIGNIFICANT CHANGE UP (ref 43–77)
PLATELET # BLD AUTO: 458 K/UL — HIGH (ref 150–400)
POTASSIUM SERPL-MCNC: 4.6 MMOL/L — SIGNIFICANT CHANGE UP (ref 3.5–5.3)
POTASSIUM SERPL-SCNC: 4.6 MMOL/L — SIGNIFICANT CHANGE UP (ref 3.5–5.3)
PROT SERPL-MCNC: 7.8 G/DL — SIGNIFICANT CHANGE UP (ref 6.6–8.7)
PROTHROM AB SERPL-ACNC: 12.5 SEC — SIGNIFICANT CHANGE UP (ref 10.6–13.6)
RBC # BLD: 4.88 M/UL — SIGNIFICANT CHANGE UP (ref 3.8–5.2)
RBC # FLD: 13.2 % — SIGNIFICANT CHANGE UP (ref 10.3–14.5)
SARS-COV-2 RNA SPEC QL NAA+PROBE: SIGNIFICANT CHANGE UP
SODIUM SERPL-SCNC: 136 MMOL/L — SIGNIFICANT CHANGE UP (ref 135–145)
WBC # BLD: 10.29 K/UL — SIGNIFICANT CHANGE UP (ref 3.8–10.5)
WBC # FLD AUTO: 10.29 K/UL — SIGNIFICANT CHANGE UP (ref 3.8–10.5)

## 2021-11-09 PROCEDURE — U0005: CPT

## 2021-11-09 PROCEDURE — 85025 COMPLETE CBC W/AUTO DIFF WBC: CPT

## 2021-11-09 PROCEDURE — 86900 BLOOD TYPING SEROLOGIC ABO: CPT

## 2021-11-09 PROCEDURE — 85610 PROTHROMBIN TIME: CPT

## 2021-11-09 PROCEDURE — 36415 COLL VENOUS BLD VENIPUNCTURE: CPT

## 2021-11-09 PROCEDURE — 80053 COMPREHEN METABOLIC PANEL: CPT

## 2021-11-09 PROCEDURE — 86901 BLOOD TYPING SEROLOGIC RH(D): CPT

## 2021-11-09 PROCEDURE — 86850 RBC ANTIBODY SCREEN: CPT

## 2021-11-09 PROCEDURE — 85730 THROMBOPLASTIN TIME PARTIAL: CPT

## 2021-11-09 PROCEDURE — U0003: CPT

## 2021-11-11 ENCOUNTER — OUTPATIENT (OUTPATIENT)
Dept: OUTPATIENT SERVICES | Facility: HOSPITAL | Age: 36
LOS: 1 days | End: 2021-11-11
Payer: COMMERCIAL

## 2021-11-11 ENCOUNTER — RESULT REVIEW (OUTPATIENT)
Age: 36
End: 2021-11-11

## 2021-11-11 VITALS
RESPIRATION RATE: 17 BRPM | OXYGEN SATURATION: 95 % | SYSTOLIC BLOOD PRESSURE: 120 MMHG | TEMPERATURE: 98 F | DIASTOLIC BLOOD PRESSURE: 78 MMHG | HEART RATE: 98 BPM

## 2021-11-11 VITALS
TEMPERATURE: 98 F | OXYGEN SATURATION: 96 % | HEART RATE: 114 BPM | HEIGHT: 61 IN | RESPIRATION RATE: 16 BRPM | SYSTOLIC BLOOD PRESSURE: 136 MMHG | WEIGHT: 164.91 LBS | DIASTOLIC BLOOD PRESSURE: 82 MMHG

## 2021-11-11 DIAGNOSIS — R10.9 UNSPECIFIED ABDOMINAL PAIN: ICD-10-CM

## 2021-11-11 PROCEDURE — 76942 ECHO GUIDE FOR BIOPSY: CPT

## 2021-11-11 PROCEDURE — 81025 URINE PREGNANCY TEST: CPT

## 2021-11-11 PROCEDURE — 76942 ECHO GUIDE FOR BIOPSY: CPT | Mod: 26

## 2021-11-11 PROCEDURE — 88307 TISSUE EXAM BY PATHOLOGIST: CPT | Mod: 26

## 2021-11-11 PROCEDURE — 47000 NEEDLE BIOPSY OF LIVER PERQ: CPT

## 2021-11-11 PROCEDURE — 88333 PATH CONSLTJ SURG CYTO XM 1: CPT

## 2021-11-11 PROCEDURE — 88307 TISSUE EXAM BY PATHOLOGIST: CPT

## 2021-11-11 PROCEDURE — 88333 PATH CONSLTJ SURG CYTO XM 1: CPT | Mod: 26

## 2021-11-11 NOTE — ASU DISCHARGE PLAN (ADULT/PEDIATRIC) - ASU DC SPECIAL INSTRUCTIONSFT
Biopsy Discharge    Discharge Instructions  - You have had a biopsy of a liver lesion  - You may shower in 24 hours. No soaking or swimming until the site is completely healed.  - Keep the area covered and dry for the next 24 hours.  - Do not perform any heavy lifting for the next few days or until the site is healed.  - You may resume your normal diet.  - You may resume your normal medications however you should wait 48 hours before restarting aspirin, plavix, or blood thinners.  - It is normal to experience some pain over the site for the next few days. You may take apply ice to the area (20 minutes on, 20 minutes off) and take Tylenol for that pain. Do not take more frequently than every 6 hours and do not exceed more than 3000mg of Tylenol in a 24 hour period.    - You were given conscious sedation which may make you drowsy, therefore you need someone to stay with you until the morning following the procedure.  - Do not drive, engage in heavy lifting or strenuous activity, or drink any alcoholic beverages for the next 24 hours.   - You may resume normal activity in 24 hours.    Notify your primary physician and/or Interventional Radiology IMMEDIATELY if you experience any of the following       - Fever of 101F or 38C       - Chills or Rigors/ Shakes       - Swelling and/or Redness in the area around the biopsy site       - Worsening Pain       - Blood soaked bandages or worsening bleeding       - Lightheadedness and/or dizziness upon standing       - Chest Pain/ Tightness       - Shortness of Breath       - Difficulty walking    If you have a problem that you believe requires IMMEDIATE attention, please go to your NEAREST Emergency Room. If you believe your problem can safely wait until you speak to a physician, please call Interventional Radiology for any concerns.    During Normal Weekday Business Hours- You can contact the Interventional Radiology department during normal business hours via telephone.  During Evenings and Weekends- If you need to contact Interventional Radiology during off hours, do so by calling the hospital and requesting to be connected to the Interventional Radiologist on call.

## 2021-11-11 NOTE — PROGRESS NOTE ADULT - SUBJECTIVE AND OBJECTIVE BOX
IR Post Procedure Note    Diagnosis: Right Liver Lesion    Procedure: Right Liver Lesion Biopsy    : Blake Remy MD    Contrast: None    Anesthesia: 1% Lidocaine Subcutaneous, Sedation administered by Anesthesiology    Estimated Blood Loss: Less than 10cc    Specimens: Specimens identified, labeled, confirmed and sent to lab. Adequacy of sample confirmed by Cytopathology Team.    Complications: No Immediate Complications    Anticoagulation: Resume in 48 Hours    Findings & Plan: 3 18g core bx of Right liver lesion obtained w Temno biopsy needle under US guidance. No hematoma or complications on post procedure US.      Please call Interventional Radiology with any questions, concerns, or issues.

## 2021-11-15 LAB — SURGICAL PATHOLOGY STUDY: SIGNIFICANT CHANGE UP

## 2021-11-16 DIAGNOSIS — R92.8 OTHER ABNORMAL AND INCONCLUSIVE FINDINGS ON DIAGNOSTIC IMAGING OF BREAST: ICD-10-CM

## 2021-11-16 RX ORDER — TRAMADOL HYDROCHLORIDE 50 MG/1
50 TABLET, COATED ORAL
Qty: 42 | Refills: 0 | Status: ACTIVE | COMMUNITY
Start: 2021-11-16 | End: 1900-01-01

## 2021-11-17 ENCOUNTER — APPOINTMENT (OUTPATIENT)
Dept: MAMMOGRAPHY | Facility: CLINIC | Age: 36
End: 2021-11-17
Payer: COMMERCIAL

## 2021-11-17 ENCOUNTER — RESULT REVIEW (OUTPATIENT)
Age: 36
End: 2021-11-17

## 2021-11-17 ENCOUNTER — APPOINTMENT (OUTPATIENT)
Dept: ULTRASOUND IMAGING | Facility: CLINIC | Age: 36
End: 2021-11-17
Payer: COMMERCIAL

## 2021-11-17 ENCOUNTER — OUTPATIENT (OUTPATIENT)
Dept: OUTPATIENT SERVICES | Facility: HOSPITAL | Age: 36
LOS: 1 days | End: 2021-11-17
Payer: COMMERCIAL

## 2021-11-17 DIAGNOSIS — R92.8 OTHER ABNORMAL AND INCONCLUSIVE FINDINGS ON DIAGNOSTIC IMAGING OF BREAST: ICD-10-CM

## 2021-11-17 PROCEDURE — 88341 IMHCHEM/IMCYTCHM EA ADD ANTB: CPT | Mod: 26,59

## 2021-11-17 PROCEDURE — 77066 DX MAMMO INCL CAD BI: CPT | Mod: 26

## 2021-11-17 PROCEDURE — 19084 BX BREAST ADD LESION US IMAG: CPT | Mod: RT

## 2021-11-17 PROCEDURE — 88305 TISSUE EXAM BY PATHOLOGIST: CPT | Mod: 26

## 2021-11-17 PROCEDURE — 88342 IMHCHEM/IMCYTCHM 1ST ANTB: CPT

## 2021-11-17 PROCEDURE — 19083 BX BREAST 1ST LESION US IMAG: CPT | Mod: LT

## 2021-11-17 PROCEDURE — 88341 IMHCHEM/IMCYTCHM EA ADD ANTB: CPT

## 2021-11-17 PROCEDURE — 88305 TISSUE EXAM BY PATHOLOGIST: CPT

## 2021-11-17 PROCEDURE — 19083 BX BREAST 1ST LESION US IMAG: CPT

## 2021-11-17 PROCEDURE — 88342 IMHCHEM/IMCYTCHM 1ST ANTB: CPT | Mod: 26,59

## 2021-11-17 PROCEDURE — 77066 DX MAMMO INCL CAD BI: CPT

## 2021-11-17 PROCEDURE — 19084 BX BREAST ADD LESION US IMAG: CPT

## 2021-11-17 PROCEDURE — A4648: CPT

## 2021-11-22 ENCOUNTER — NON-APPOINTMENT (OUTPATIENT)
Age: 36
End: 2021-11-22

## 2021-11-23 ENCOUNTER — NON-APPOINTMENT (OUTPATIENT)
Age: 36
End: 2021-11-23

## 2021-11-23 ENCOUNTER — APPOINTMENT (OUTPATIENT)
Dept: SURGERY | Facility: CLINIC | Age: 36
End: 2021-11-23
Payer: COMMERCIAL

## 2021-11-23 VITALS
TEMPERATURE: 98 F | BODY MASS INDEX: 30.78 KG/M2 | WEIGHT: 163 LBS | DIASTOLIC BLOOD PRESSURE: 82 MMHG | HEIGHT: 61 IN | SYSTOLIC BLOOD PRESSURE: 122 MMHG | HEART RATE: 102 BPM | OXYGEN SATURATION: 95 %

## 2021-11-23 PROCEDURE — 99205 OFFICE O/P NEW HI 60 MIN: CPT

## 2021-11-23 NOTE — ASSESSMENT
[FreeTextEntry1] : 37 yo female presents with stage IV breast cancer ER -[place] - Her 2 + with metastasis to liver and bone.   She is seeing Dr. Chapa and will be beginning systemic treatment.  Recommendation for PET and consultation with radiation oncology.\par 1.  Followup with Dr. Chapa\par 2.  Consult with radiation oncology\par 3.  PET CT\par 4.  Follow up genetic results from Dr. Chapa office\par 5.  Followup in 2 weeks

## 2021-11-23 NOTE — PHYSICAL EXAM
[Normocephalic] : normocephalic [Atraumatic] : atraumatic [EOMI] : extra ocular movement intact [PERRL] : pupils equal, round and reactive to light [Sclera nonicteric] : sclera nonicteric [Supple] : supple [No Supraclavicular Adenopathy] : no supraclavicular adenopathy [Examined in the supine and seated position] : examined in the supine and seated position [No dominant masses] : no dominant masses in right breast  [No dominant masses] : no dominant masses left breast [No Nipple Retraction] : no left nipple retraction [No Nipple Discharge] : no left nipple discharge [No Axillary Lymphadenopathy] : no left axillary lymphadenopathy [No Edema] : no edema [No Rashes] : no rashes [No Ulceration] : no ulceration [Breast Nipple Inversion] : nipples not inverted [Breast Nipple Retraction] : nipples not retracted [Breast Nipple Flattening] : nipples not flattened [Breast Nipple Fissures] : nipples not fissured [Breast Abnormal Lactation (Galactorrhea)] : no galactorrhea [Breast Abnormal Secretion Bloody Fluid] : no bloody discharge [Breast Abnormal Secretion Serous Fluid] : no serous discharge [Breast Abnormal Secretion Opalescent Fluid] : no milky discharge [de-identified] : Right breast 10:00 1.5 cm mass in area of biopsy proven carcinoma. [de-identified] : No supraclavicular or axillary adenopathy. No dominant masses, normal to palpation. Everted nipple without discharge. No skin changes.\par

## 2021-11-23 NOTE — HISTORY OF PRESENT ILLNESS
[FreeTextEntry1] : I had the pleasure of seeing NATIVIDAD MYERS  in the office today for a new breast evaluation.\par \par Natividad is a leah 37 yo who was diagnosed with stage IV breast cancer at age 36 ( Right IDC +ILC grade 3 ER - [place] - Her2 + metastatic).  She reports having both neck and back pain starting in September 2021.  She thought it was job related since she works in the [employer] which requires heavy lifting.  A few days later she started having spasms while at work and was sent to the ER.  ER had given morphine and muscle relaxants which helped.  She was then referred to ortho.  When symptoms became worse she was then sent for imaging.  She was doing PT and trigger injections at the time.  She underwent CT of spine on 11/3/2021 demonstrating liver masses, bone lesions, and mildly enlarged lymph node.  She then underwent liver biopsy demonstrating positive adenocarcinoma consistent with primary breast ER- [place] - Her2+.   She then underwent additional breast imaging.  Right breast 10:00 was biopsied demonstrating IDC + ILC grade 3 ER -[place] -Her2 negative.  Her medical oncologist is .\par \par \par 11/8/2021  CT C/A/P\par 1.  Liver masses, bone lesions, and mildly enlarged lymph nodes in the chest and abdomen, highly concerning for metastatic disease.\par 2.  Interlobular septal thickening in the lungs which may represent edema or lymphangitic spread of tumor. Mild patchy groundglass opacities may be related to edema or infection although neoplasm cannot be excluded. Small nodular densities measuring up to 7 mm in the left upper lobe. These may be benign although metastatic disease cannot be excluded.\par \par 11/8/2021  Bone scan\par IMPRESSION: Abnormal bone scan consistent with multiple osseous metastases in the sternum, ribs, spine, pelvis, and proximal right humerus.\par \par 11/2021  Pathology\par Liver mass, needle core biopsy:  Positive for adenocarcinoma, NOS.\par \par 11/17/2021  Pathology\par 1. RIGHT BREAST, 10:00, 8 CM FROM NIPPLE, ULTRASOUND GUIDED CORE BIOPSY:\par -INFILTRATING CARCINOMA WITH DUCTAL AND LOBULAR FEATURES, GRADE 3 TRICIA SCORE 8/9 (3,3,2)\par -DUCTAL CARCINOMA IN SITU, COMEDO TYPE WITH CALCIFICATIONS, NUCLEAR GRADE 2\par -TUMOR SIZE (INFILTRATING) APPROXIMATELY 0.7 CM\par -CANNOT EXCLUDE LYMPHOVASCULAR INVASION\par 2. LEFT BREAST AXILLA, ULTRASOUND GUIDED CORE BIOPSY:\par -LYMPHOID TISSUE WITH REACTIVE GERMINAL CENTERS\par -NO CARCINOMA IDENTIFIED\par These results are CONCORDANT\par RECOMMENDATION: Surgical or oncologic management.\par \par We reviewed and discussed current diagnosis of Stage IV metastatic breast cancer.  We also discussed the difference between the biology of cancer, and reviewed the particular biology identified on pathology and the significance in terms of treatment. We discussed the need for chemotherapy and she currently under the care of Dr. Chapa.\par \par We offered assistance in scheduling all multi-disciplinary appointments and imaging. We also assist in scheduling all appointments needed.\par \par We discussed the benefit of the  who assists patients within the Cancer [facility], as well as coalition support. We have also discussed nutritional support, genetic counseling and physical therapy services we offer.\par \par \par She understands the plan and all questions were answered.\par

## 2021-11-30 ENCOUNTER — OUTPATIENT (OUTPATIENT)
Dept: OUTPATIENT SERVICES | Facility: HOSPITAL | Age: 36
LOS: 1 days | End: 2021-11-30

## 2021-11-30 ENCOUNTER — APPOINTMENT (OUTPATIENT)
Dept: NUCLEAR MEDICINE | Facility: CLINIC | Age: 36
End: 2021-11-30
Payer: COMMERCIAL

## 2021-11-30 DIAGNOSIS — C50.919 MALIGNANT NEOPLASM OF UNSPECIFIED SITE OF UNSPECIFIED FEMALE BREAST: ICD-10-CM

## 2021-11-30 PROCEDURE — 78815 PET IMAGE W/CT SKULL-THIGH: CPT | Mod: 26,PI

## 2021-12-03 NOTE — ADDENDUM
[FreeTextEntry1] : Documented by Juan Vitale acting as a scribe for Kaity Echols on 11/06/2021 . All medical record entries made by the Scribe were at my, Kaity Echols , direction and personally dictated by me on 11/06/2021  . I have reviewed the chart and agree that the record accurately reflects my personal performance of the history, physical exam, assessment and plan. I have also personally directed, reviewed, and agreed with the chart.

## 2021-12-03 NOTE — HISTORY OF PRESENT ILLNESS
[de-identified] : 36 year old F presents for interpretation of recent images indicating metastatic disease, she remains neurologically intact and pain is controlled. Patient is pending breast surgery evaluation and oncology evaluation. Patient continues to complain of lower back pain that began when she was putting her son down, the pain was intense but improved after that day and became bearable. She states that she presented to work with some mild pain and believes dawning the leads caused the pain to be exacerbated again. Exacerbating factors include bending forward or leaning back. She has been to the ED and was given Morphine and muscle relaxants which helped greatly, she was also given a Medrol pack. She had Toradol and injections under Dr. Pizano. After her assessment patient called the office and said pain had become intense and radiated to her neck, she presented to [facility] ED and was released with Medrol pack, muscle relaxants, and antiinflammatories. She has been to PT for the last 3 weeks and saw a pain management physician who ordered a cervical MRI.  [Ataxia] : no ataxia [Incontinence] : no incontinence [Loss of Dexterity] : good dexterity [Urinary Ret.] : no urinary retention

## 2021-12-03 NOTE — DISCUSSION/SUMMARY
[Medication Risks Reviewed] : Medication risks reviewed [Surgical risks reviewed] : Surgical risks reviewed [de-identified] : We talked about the nature of the condition and treatment options. Anticipatory guidance regarding metastatic disease was given. Patient will follow up with oncology and breast surgery, as long as she remains neurologically stable I would not recommend surgical intervention at this time as it would offset her cancer treatment by 4 to 6 weeks. Focusing on this metastatic disease is more important from a survival standpoint. \par \par \par Xanax 0.25 mg tid prn anxiety was prescribed, tramadol 50 mg every 4-6 hours prn pain was prescribed, may add tylenol 975 mg tid-qid.  methocarbamol 500 mg tid prn spasm was prescribed.  Prescription to show her operating surgeon was provided, she is not to be hyper extended and a glide scope must be used regarding her neck, this is regarding cervical clearance. She is following up with her PCP on 11/09/2021 and I would like her to report back the findings of her oncology and PCP evaluations so we may better assist in her guidance. we will repeat images at the end of November to ensure stability, she will follow up thereafter. Call immediately if there is neurological decline, weakness, or sever pain. 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 This disability will likely remain permanent and we be addressed definitievly by her oncologist and PCP in the near future. \par \par \par The patient will follow up in 1 month for a repeat clinical assessment. \par \par During this appointment the patient was examined and diagnoses, counseling, and coordination of care were discussed and explained in a face to face manner for a total time of 45 minutes. Prior to appointment and during encounter with patient extensive medical records were reviewed including but not limited to, hospital records, out patient records, imaging results, and lab data. In addition extensive time was spent reviewing aforementioned diagnostic studies. Counseling included abnormal image results, differential diagnoses, treatment options, risk and benefits, lifestyle changes, current condition, and current medications was performed. Patient's comments, questions, and concerns were address and patient verbalized understanding. Based on this patient's presentation at our office, which is an orthopedic spine surgeon's office, this patient inherently / intrinsically has a risk, however minute, of developing  issues such as Cauda equina syndrome, bowel and bladder changes, or progression of motor or neurological deficits such as paralysis which may be permanent. \par \par Patient with multiple medical comorbidity increasing the risk of perioperative and postoperative complications as well as diminished spine outcomes as per the current medical literature.  These include but are not limited to obesity, anxiety/depression, cardiac illness, kidney disease, peripheral vascular disease, history of cancer, COPD, dysmetabolic syndrome including but not limited to diabetes, hyperlipidemia, hypertension.  Patient is being referred back to primary care provider for medical optimization, as well as other appropriate specialists as needed for optimization prior to spine surgery. \par \par Multilevel corpectomy and fusion was discussed. Anatomic models, Xrays, CT scans/MRI’s were utilized to provide a firm understanding of their surgical plan. Patient is aware that surgery is elective in nature. Risks, benefits, alternatives were discussed and all questions, comments and concerns were encouraged and answered to the patient's satisfaction. The statistical probability of improvement was discussed at length as well as post surgical course. Literature from [employer] spine society was provided to the patient regarding the specific type of surgery as well as a 5 page written surgical consent which the patient will need to sign and return to the office prior to surgical date. Consent forms highlight specific complications related to the complex nature of spinal surgery.\par  \par Risks of cervical surgery include: dysphagia/difficulty swallowing, Dysphonia/altered voice, adjacent segment disease (which will require more surgery in the future), vascular compromise and stroke, and persistent pain.\par  \par Benefits of cervical surgery include Improved neurologic function and pain score\par  \par We also discussed with the patient complications of incisions directly related to obesity, diabetes, previous wound complications or post-surgical wound infections, smoking, neuropathy, and chronic anticoagulation. This risk has been specifically discussed and the patient will discuss modifiable risk factors to be optimized prior to surgical management. A multimodality approach of primary care physician, and medicine subspecialists will be utilized to optimize medical risk factors.\par  \par If patient is a smoker, discontinuation of smoking was advised and must be accomplished 6-8 weeks prior to surgery date. Patient was advised that help with quitting smoking is available through [employer] Smoker's Quit Line and phone number/website was provided, or patient can ask assistance from primary care provider. Elective surgery will not be performed unless patient complies with this policy. \par \par xanax 0.25 mg tid prn anxiety, tramadol 50 mg every 4-6 hours prn pain, may add tylenol 975 mg tid-qid.  methocarbamol 500 mg tid prn spasm.  Prescription to show her operating surgeon was provided, she is not to be hyper extended and a glide scope must be used regarding her neck this is regarding cervical clearance. She is following up with her PCP on 11/09/2021 and I would like her to report back the findings of her oncology and PCP evaluations so we may better assist in her guidance. we will repeat images at the end of November to ensure stability, she will follow up thereafter. Call immediately if there is neurological decline, weakness, or sever pain. \par \par ADDENDUM 12.3.21- I spoke to Whitney and we want repeat CT C, T, L spine to evaluate spinal stability in the face of metastatic neoplasms.  She will get studies done and follow up.

## 2021-12-03 NOTE — PHYSICAL EXAM
[Obese] : obese [Poor Appearance] : well-appearing [Acute Distress] : not in acute distress [de-identified] : CONSTITUTIONAL: The patient is a very pleasant individual who is well-nourished and who appears stated age.\par PSYCHIATRIC: The patient is alert and oriented X 3 and in no apparent distress, and participates with orthopedic evaluation well.\par HEAD: Atraumatic and is nonsyndromic in appearance.\par EENT: No visible thyromegaly, EOMI.\par RESPIRATORY: Respiratory rate is regular, not dyspneic on examination.\par LYMPHATICS: There is no inguinal lymphadenopathy\par INTEGUMENTARY: Skin is clean, dry, and intact about the bilateral lower extremities and lumbar spine.\par VASCULAR: There is brisk capillary refill about the bilateral lower extremities.\par NEUROLOGIC: There are no pathologic reflexes. There is no decrease in sensation of the bilateral lower extremities on Wartenberg pinwheel examination. Deep tendon reflexes are well maintained at 2+/4 of the bilateral lower extremities and are symmetric.\par MUSCULOSKELETAL: There is no visible muscular atrophy. Manual motor strength is well maintained in the bilateral lower extremities. Range of motion of lumbar spine is well maintained. The patient ambulates in a non-myelopathic manner. Negative tension sign and straight leg raise bilaterally. Quad extension, ankle dorsiflexion, EHL, plantar flexion, and ankle eversion are well preserved. Normal secondary orthopaedic exam of bilateral hips, greater trochanteric area, knees and ankles. No pain with internal or external rotation of the bilateral hips. Muscle sprain and strain characteristics. Pain with palpation to the cervical and lumbar spine.  [de-identified] : Xray of the Lumbar spine taken 10/05/2021 : AP projection shows pedicles are well visualized L1-L5, no rotoscoliosis, lateral projections show well maintained lumbar lordosis with no acute processes noted. \par \par MRI of the lumbar spine taken at [facility] on 10- demonstrates no significant compressive lesions, there are multiple osseous lesions in the lumbar spine possibly consistent with metastatic disease. \par \par MRI of the cervical spine taken at an outside facility demonstrates a C3 metastatic lesion, no canal stenosis, no epidural involvement. \par \par Cervical and thoracic CT scans from [facility] demonstrate multiple metastatic lesions. \par \par Thoracic MRI from [facility] demonstrates metastatic lesion

## 2021-12-07 ENCOUNTER — APPOINTMENT (OUTPATIENT)
Dept: RADIATION ONCOLOGY | Facility: CLINIC | Age: 36
End: 2021-12-07

## 2021-12-14 ENCOUNTER — APPOINTMENT (OUTPATIENT)
Dept: SURGERY | Facility: CLINIC | Age: 36
End: 2021-12-14
Payer: COMMERCIAL

## 2021-12-14 VITALS
HEIGHT: 61 IN | DIASTOLIC BLOOD PRESSURE: 85 MMHG | TEMPERATURE: 96.08 F | BODY MASS INDEX: 31.34 KG/M2 | SYSTOLIC BLOOD PRESSURE: 143 MMHG | WEIGHT: 166 LBS | HEART RATE: 103 BPM | OXYGEN SATURATION: 97 %

## 2021-12-14 PROCEDURE — 99214 OFFICE O/P EST MOD 30 MIN: CPT

## 2021-12-14 NOTE — HISTORY OF PRESENT ILLNESS
[FreeTextEntry1] : I had the pleasure of seeing NATIVIDAD MYERS  in the office today for a new breast evaluation.\par \par Natividad is a leah 37 yo who was diagnosed with stage IV breast cancer at age 36 ( Right IDC +ILC grade 3 ER - [facility] - Her2 + metastatic).  She reports having both neck and back pain starting in September 2021.  She thought it was job related since she works in the [employer] which requires heavy lifting.  A few days later she started having spasms while at work and was sent to the ER.  ER had given morphine and muscle relaxants which helped.  She was then referred to ortho.  When symptoms became worse she was then sent for imaging.  She was doing PT and trigger injections at the time.  She underwent CT of spine on 11/3/2021 demonstrating liver masses, bone lesions, and mildly enlarged lymph node.  She then underwent liver biopsy demonstrating positive adenocarcinoma consistent with primary breast ER- [facility] - Her2+.   She then underwent additional breast imaging.  Right breast 10:00 was biopsied demonstrating IDC + ILC grade 3 ER -[facility] -Her2 negative.  Her medical oncologist is .\par \par \par 11/8/2021  CT C/A/P\par 1.  Liver masses, bone lesions, and mildly enlarged lymph nodes in the chest and abdomen, highly concerning for metastatic disease.\par 2.  Interlobular septal thickening in the lungs which may represent edema or lymphangitic spread of tumor. Mild patchy groundglass opacities may be related to edema or infection although neoplasm cannot be excluded. Small nodular densities measuring up to 7 mm in the left upper lobe. These may be benign although metastatic disease cannot be excluded.\par \par 11/8/2021  Bone scan\par IMPRESSION: Abnormal bone scan consistent with multiple osseous metastases in the sternum, ribs, spine, pelvis, and proximal right humerus.\par \par 11/2021  Pathology\par Liver mass, needle core biopsy:  Positive for adenocarcinoma, NOS.\par \par 11/17/2021  Pathology\par 1. RIGHT BREAST, 10:00, 8 CM FROM NIPPLE, ULTRASOUND GUIDED CORE BIOPSY:\par -INFILTRATING CARCINOMA WITH DUCTAL AND LOBULAR FEATURES, GRADE 3 TRICIA SCORE 8/9 (3,3,2)\par -DUCTAL CARCINOMA IN SITU, COMEDO TYPE WITH CALCIFICATIONS, NUCLEAR GRADE 2\par -TUMOR SIZE (INFILTRATING) APPROXIMATELY 0.7 CM\par -CANNOT EXCLUDE LYMPHOVASCULAR INVASION\par 2. LEFT BREAST AXILLA, ULTRASOUND GUIDED CORE BIOPSY:\par -LYMPHOID TISSUE WITH REACTIVE GERMINAL CENTERS\par -NO CARCINOMA IDENTIFIED\par These results are CONCORDANT\par RECOMMENDATION: Surgical or oncologic management.\par \par 11/30/2021  PET CT\par IMPRESSION: Abnormal uterine FDG-PET/CT scan.\par 1. FDG-avid mass, lateral aspect right breast corresponds to biopsy-proven malignancy.\par 2. Multiple FDG-avid lymph nodes in right axilla, right retropectoral region, and retroperitoneum are compatible with metastatic disease.\par 3. Multiple FDG-avid bilobar hepatic metastases, best delineated on recent contrast-enhanced CT.\par 4. FDG-avid diffuse bilateral interlobular septal thickening and groundglass and nodular bilateral pulmonary opacities, increased as compared to CT dated 11/3/2021, are compatible with lymphangitic spread of tumor. Correlate clinically to exclude superimposed infection.\par \par We reviewed imaging PET CT.  She is under the care of  for stage IV breast cancer with mets to lung, liver, and bone.  She was recently hospitalized at [facility] for malignant PE and needed a Chest tube.  She is now on nasal O2.  Recommendation for follow up in 4 weeks.\par \par She understands the plan and all questions were answered.\par

## 2021-12-14 NOTE — PHYSICAL EXAM
[Normocephalic] : normocephalic [Atraumatic] : atraumatic [EOMI] : extra ocular movement intact [PERRL] : pupils equal, round and reactive to light [Sclera nonicteric] : sclera nonicteric [Supple] : supple [No Supraclavicular Adenopathy] : no supraclavicular adenopathy [Examined in the supine and seated position] : examined in the supine and seated position [No dominant masses] : no dominant masses in right breast  [No dominant masses] : no dominant masses left breast [No Nipple Retraction] : no left nipple retraction [No Nipple Discharge] : no left nipple discharge [Breast Nipple Inversion] : nipples not inverted [Breast Nipple Retraction] : nipples not retracted [Breast Nipple Flattening] : nipples not flattened [Breast Nipple Fissures] : nipples not fissured [Breast Abnormal Lactation (Galactorrhea)] : no galactorrhea [Breast Abnormal Secretion Bloody Fluid] : no bloody discharge [Breast Abnormal Secretion Serous Fluid] : no serous discharge [Breast Abnormal Secretion Opalescent Fluid] : no milky discharge [No Axillary Lymphadenopathy] : no left axillary lymphadenopathy [No Edema] : no edema [No Rashes] : no rashes [No Ulceration] : no ulceration [de-identified] : Right breast 10:00 1.5 cm mass in area of biopsy proven carcinoma. [de-identified] : No supraclavicular or axillary adenopathy. No dominant masses, normal to palpation. Everted nipple without discharge. No skin changes.\par

## 2021-12-14 NOTE — ASSESSMENT
[FreeTextEntry1] : 35 yo female presents with stage IV breast cancer ER -[facility] - Her 2 + with metastasis to lung, liver, and bone.   We reviewed imaging PET CT.  She is under the care of  for stage IV breast cancer with mets to lung, liver, and bone.  She was recently hospitalized at [facility] for malignant PE and needed a Chest tube.  She is now on nasal O2.  Recommendation for follow up in 4 weeks.\par 1.  Followup with Dr. Chapa\par 2.  Followup in 4 weeks

## 2021-12-22 ENCOUNTER — INPATIENT (INPATIENT)
Facility: HOSPITAL | Age: 36
LOS: 11 days | Discharge: HOME | DRG: 286 | End: 2022-01-03
Attending: STUDENT IN AN ORGANIZED HEALTH CARE EDUCATION/TRAINING PROGRAM | Admitting: INTERNAL MEDICINE
Payer: COMMERCIAL

## 2021-12-22 VITALS
SYSTOLIC BLOOD PRESSURE: 140 MMHG | HEART RATE: 121 BPM | WEIGHT: 167.99 LBS | TEMPERATURE: 97 F | RESPIRATION RATE: 22 BRPM | HEIGHT: 61 IN | DIASTOLIC BLOOD PRESSURE: 91 MMHG | OXYGEN SATURATION: 90 %

## 2021-12-22 DIAGNOSIS — J96.01 ACUTE RESPIRATORY FAILURE WITH HYPOXIA: ICD-10-CM

## 2021-12-22 LAB
ANION GAP SERPL CALC-SCNC: 15 MMOL/L — SIGNIFICANT CHANGE UP (ref 5–17)
APTT BLD: 36.7 SEC — HIGH (ref 27.5–35.5)
BASOPHILS # BLD AUTO: 0.01 K/UL — SIGNIFICANT CHANGE UP (ref 0–0.2)
BASOPHILS NFR BLD AUTO: 0.1 % — SIGNIFICANT CHANGE UP (ref 0–2)
BLD GP AB SCN SERPL QL: SIGNIFICANT CHANGE UP
BUN SERPL-MCNC: 7.5 MG/DL — LOW (ref 8–20)
CALCIUM SERPL-MCNC: 8.7 MG/DL — SIGNIFICANT CHANGE UP (ref 8.6–10.2)
CHLORIDE SERPL-SCNC: 100 MMOL/L — SIGNIFICANT CHANGE UP (ref 98–107)
CO2 SERPL-SCNC: 21 MMOL/L — LOW (ref 22–29)
CREAT SERPL-MCNC: 0.4 MG/DL — LOW (ref 0.5–1.3)
EOSINOPHIL # BLD AUTO: 0.13 K/UL — SIGNIFICANT CHANGE UP (ref 0–0.5)
EOSINOPHIL NFR BLD AUTO: 1.4 % — SIGNIFICANT CHANGE UP (ref 0–6)
FLUAV AG NPH QL: SIGNIFICANT CHANGE UP
FLUBV AG NPH QL: SIGNIFICANT CHANGE UP
GLUCOSE SERPL-MCNC: 93 MG/DL — SIGNIFICANT CHANGE UP (ref 70–99)
HCG SERPL-ACNC: <4 MIU/ML — SIGNIFICANT CHANGE UP
HCT VFR BLD CALC: 36.4 % — SIGNIFICANT CHANGE UP (ref 34.5–45)
HGB BLD-MCNC: 11.7 G/DL — SIGNIFICANT CHANGE UP (ref 11.5–15.5)
IMM GRANULOCYTES NFR BLD AUTO: 0.4 % — SIGNIFICANT CHANGE UP (ref 0–1.5)
INR BLD: 1.36 RATIO — HIGH (ref 0.88–1.16)
LYMPHOCYTES # BLD AUTO: 0.9 K/UL — LOW (ref 1–3.3)
LYMPHOCYTES # BLD AUTO: 9.5 % — LOW (ref 13–44)
MCHC RBC-ENTMCNC: 28.1 PG — SIGNIFICANT CHANGE UP (ref 27–34)
MCHC RBC-ENTMCNC: 32.1 GM/DL — SIGNIFICANT CHANGE UP (ref 32–36)
MCV RBC AUTO: 87.5 FL — SIGNIFICANT CHANGE UP (ref 80–100)
MONOCYTES # BLD AUTO: 0.75 K/UL — SIGNIFICANT CHANGE UP (ref 0–0.9)
MONOCYTES NFR BLD AUTO: 8 % — SIGNIFICANT CHANGE UP (ref 2–14)
NEUTROPHILS # BLD AUTO: 7.6 K/UL — HIGH (ref 1.8–7.4)
NEUTROPHILS NFR BLD AUTO: 80.6 % — HIGH (ref 43–77)
NT-PROBNP SERPL-SCNC: 58 PG/ML — SIGNIFICANT CHANGE UP (ref 0–300)
PLATELET # BLD AUTO: 470 K/UL — HIGH (ref 150–400)
POTASSIUM SERPL-MCNC: 4.2 MMOL/L — SIGNIFICANT CHANGE UP (ref 3.5–5.3)
POTASSIUM SERPL-SCNC: 4.2 MMOL/L — SIGNIFICANT CHANGE UP (ref 3.5–5.3)
PROTHROM AB SERPL-ACNC: 15.5 SEC — HIGH (ref 10.6–13.6)
RBC # BLD: 4.16 M/UL — SIGNIFICANT CHANGE UP (ref 3.8–5.2)
RBC # FLD: 14.7 % — HIGH (ref 10.3–14.5)
RSV RNA NPH QL NAA+NON-PROBE: SIGNIFICANT CHANGE UP
SARS-COV-2 RNA SPEC QL NAA+PROBE: SIGNIFICANT CHANGE UP
SODIUM SERPL-SCNC: 136 MMOL/L — SIGNIFICANT CHANGE UP (ref 135–145)
TROPONIN T SERPL-MCNC: <0.01 NG/ML — SIGNIFICANT CHANGE UP (ref 0–0.06)
WBC # BLD: 9.43 K/UL — SIGNIFICANT CHANGE UP (ref 3.8–10.5)
WBC # FLD AUTO: 9.43 K/UL — SIGNIFICANT CHANGE UP (ref 3.8–10.5)

## 2021-12-22 PROCEDURE — 71275 CT ANGIOGRAPHY CHEST: CPT | Mod: 26,MG

## 2021-12-22 PROCEDURE — 99291 CRITICAL CARE FIRST HOUR: CPT

## 2021-12-22 PROCEDURE — 99223 1ST HOSP IP/OBS HIGH 75: CPT

## 2021-12-22 PROCEDURE — 93308 TTE F-UP OR LMTD: CPT | Mod: 26

## 2021-12-22 PROCEDURE — 93010 ELECTROCARDIOGRAM REPORT: CPT

## 2021-12-22 PROCEDURE — G1004: CPT

## 2021-12-22 RX ORDER — ALBUTEROL 90 UG/1
2.5 AEROSOL, METERED ORAL ONCE
Refills: 0 | Status: COMPLETED | OUTPATIENT
Start: 2021-12-22 | End: 2021-12-22

## 2021-12-22 RX ORDER — MAGNESIUM SULFATE 500 MG/ML
2 VIAL (ML) INJECTION ONCE
Refills: 0 | Status: COMPLETED | OUTPATIENT
Start: 2021-12-22 | End: 2021-12-22

## 2021-12-22 RX ORDER — IPRATROPIUM/ALBUTEROL SULFATE 18-103MCG
3 AEROSOL WITH ADAPTER (GRAM) INHALATION ONCE
Refills: 0 | Status: COMPLETED | OUTPATIENT
Start: 2021-12-22 | End: 2021-12-22

## 2021-12-22 RX ORDER — CARISOPRODOL 250 MG
1 TABLET ORAL
Qty: 0 | Refills: 0 | DISCHARGE

## 2021-12-22 RX ORDER — MORPHINE SULFATE 50 MG/1
4 CAPSULE, EXTENDED RELEASE ORAL ONCE
Refills: 0 | Status: DISCONTINUED | OUTPATIENT
Start: 2021-12-22 | End: 2021-12-22

## 2021-12-22 RX ADMIN — Medication 0.5 MILLIGRAM(S): at 20:45

## 2021-12-22 RX ADMIN — Medication 0.5 MILLIGRAM(S): at 21:01

## 2021-12-22 RX ADMIN — MORPHINE SULFATE 4 MILLIGRAM(S): 50 CAPSULE, EXTENDED RELEASE ORAL at 22:47

## 2021-12-22 RX ADMIN — ALBUTEROL 2.5 MILLIGRAM(S): 90 AEROSOL, METERED ORAL at 20:45

## 2021-12-22 RX ADMIN — Medication 3 MILLILITER(S): at 20:45

## 2021-12-22 RX ADMIN — Medication 125 MILLIGRAM(S): at 20:45

## 2021-12-22 RX ADMIN — Medication 150 GRAM(S): at 20:45

## 2021-12-22 NOTE — ED PROVIDER NOTE - OBJECTIVE STATEMENT
35 y/o female with PMHx of Metastatic Breast CA presents to ED c/o shortness of breath. Patient reports cough, shortness of breath, chest pain, and myalgias that worsened today. Patient received immunotherapy on 12/2/21 with a severe reaction s/p chest tube. She requires home O2 since then.     denies fever. denies HA or neck pain. no abd pain. no n/v/d. no urinary f/u/d. no back pain. no motor or sensory deficits. denies illicit drug use. no recent travel. no rash. no other acute issues symptoms or concerns

## 2021-12-22 NOTE — ED PROVIDER NOTE - CARE PLAN
Principal Discharge DX:	Acute respiratory failure with hypoxia  Secondary Diagnosis:	Pericardial effusion  Secondary Diagnosis:	Pleural effusion, left   1

## 2021-12-22 NOTE — H&P ADULT - ASSESSMENT
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 Santana), Anxiety, and Muscle Spasms who presents to the ED with complaints of worsening SOB and found to have bilateral pleural effusions, left sided opacities and worsening lymphangitic spread of disease.    1. Acute on Chronic Hypoxic Respiratory Failure due to Malignant Effusions and worsening lymphangitic spread of disease; rule out Atypical PNA  -admit to step down unit  -currently on Hi-José; FiO2 80% (initially on bipap)  -CT angio negative for PE; bilateral pleural effusions (left > right), left sided opacities (atelectasis vs atypical PNA) and worsening lymphangitic spread of disease  -Check procal  -Obtain blood cultures  -Start empiric abx  -BNP within normal range  -Bronchodilators and cough expectorant as needed  -anxiolytics and analgesia as needed  -incentive spirometry  -Recent admission at [facility] earlier this month with a malignant pleural effusion requiring a left sided chest tube.   -CT surgery evaluated patient in the ED; bedside sono with ~500 cc of fluid. Tentative plans for possible chest tube in AM.  -Pulm consulted  -MICU consulted in the ED; does not meet criteria for ICU level of care  -monitor respiratory status closely, prognosis guarded    2. Metastatic, Infiltrating Ductal Carcinoma of the Right Breast  -with liver and bone mets (pathologic, compression fractures of C3 and T2/T7)  -underwent immunotherapy on 12/2 and developed a hypersensitivity reaction  -was scheduled for chemo on 12/27 with Dr. Chapa ([facility])  -breast/liver biopsy and PET scan reports reviewed    3. Anxiety  -extremely anxious in the ED  -on Xanax at home  -will switch to IV ativan while inpatient  -continue escitalopram which was recently started    4. Back Pain due to Pathologic Compression Fractures  -known C3/T2 compression fractures  -CT angio with new T7 compression fracture with mild retropulsion   -Obtain dedicated imaging of T-spine  -MRIs reviewed from November 2021  -ortho spine consulted (Follows with Dr. Santana as outpatient)  -Bedrest until ortho evaluation  -Neurchecks q 4 hours  -Continue Morphine ER 30 mg TID  -Add IV dilaudid for mod/severe pain as needed  -Continue muscle relaxer - on carisoprodol as needed  -Will consider pain management consult if pain is uncontrolled    5. Small Pericardial Effusion  -likely malignant  -patient reports effusion was small on recent TTE at [facility] on 12/12 or 12/13  -patient was seen by [facility] cardio at that time  -obtain records from [facility] and repeat limited TTE      DVT ppx Lovenox    Dispo - Blood cultures. Empiric Abx. Pulm consult.     Plan discussed with patient,  at bedside, CT surgery PA, ortho PA, RN at bedside Patient is a 36 year old female with recently diagnosed Metastatic Right Breast CA (Infiltrating Ductal Carcinoma with mets to the Spine and Liver), Malignant Pleural Effusion on Home O2 (3-5 liters), Pathologic Compression Fractures (Follows with Dr. Santana), Anxiety, and Muscle Spasms who presents to the ED with complaints of worsening SOB and found to have bilateral pleural effusions, left sided opacities and worsening lymphangitic spread of disease.    1. Acute on Chronic Hypoxic Respiratory Failure due to Malignant Effusions and worsening lymphangitic spread of disease; rule out Atypical PNA  -admit to step down unit  -currently on Hi-José; FiO2 80% (initially on bipap)  -CT angio negative for PE; bilateral pleural effusions (left > right), left sided opacities (atelectasis vs atypical PNA) and worsening lymphangitic spread of disease  -Check procal  -Obtain blood cultures  -Start empiric abx  -BNP within normal range  -Bronchodilators and cough expectorant as needed  -anxiolytics and analgesia as needed  -incentive spirometry  -Recent admission at [facility] earlier this month with a malignant pleural effusion requiring a left sided chest tube.   -CT surgery evaluated patient in the ED; bedside sono with ~500 cc of fluid. Tentative plans for possible chest tube in AM.  -Pulm consulted  -MICU consulted in the ED; does not meet criteria for ICU level of care  -monitor respiratory status closely, prognosis guarded    2. Metastatic, Infiltrating Ductal Carcinoma of the Right Breast  -with liver and bone mets (pathologic, compression fractures of C3 and T2/T7)  -underwent immunotherapy on 12/2 and developed a hypersensitivity reaction  -was scheduled for chemo on 12/27 with Dr. Chapa ([facility])  -breast/liver biopsy and PET scan reports reviewed    3. Anxiety  -extremely anxious in the ED  -on Xanax at home  -will switch to IV ativan while inpatient  -continue escitalopram which was recently started    4. Back Pain due to Pathologic Compression Fractures  -known C3/T2 compression fractures  -CT angio with new T7 compression fracture with mild retropulsion   -Obtain dedicated imaging of T-spine  -MRIs reviewed from November 2021  -ortho spine consulted (Follows with Dr. Santana as outpatient)  -Bedrest until ortho evaluation  -Neurchecks q 4 hours  -Continue Morphine ER 30 mg TID  -Add IV dilaudid for mod/severe pain as needed  -Continue muscle relaxer - on carisoprodol as needed  -Will consider pain management consult if pain is uncontrolled    5. Small Pericardial Effusion  -likely malignant  -TNI negative; BNP within normal range  -EKG  -patient reports effusion was small on recent TTE at [facility] on 12/12 or 12/13  -patient was seen by [facility] cardio at that time  -obtain records from [facility] and repeat limited TTE    DVT ppx Lovenox    Dispo - Blood cultures. Empiric Abx. Pulm consult.     Plan discussed with patient,  at bedside, CT surgery PA, ortho PA, RN at bedside

## 2021-12-22 NOTE — ED ADULT NURSE NOTE - NSIMPLEMENTINTERV_GEN_ALL_ED
Implemented All Universal Safety Interventions:  Cost to call system. Call bell, personal items and telephone within reach. Instruct patient to call for assistance. Room bathroom lighting operational. Non-slip footwear when patient is off stretcher. Physically safe environment: no spills, clutter or unnecessary equipment. Stretcher in lowest position, wheels locked, appropriate side rails in place.

## 2021-12-22 NOTE — ED ADULT NURSE NOTE - OBJECTIVE STATEMENT
pt presents to ED due to sudden onset SOB while at home. has been having cough, SOB, fatigue and chest pain at home but worsened today.  pt with hx of metastatic breast Ca recently received immunotherapy infusion 12/2 with poor reaction leading her to have pulmonary edema resulting in chest tube at [facility]. pt d/c'd from [facility] on home o2 of 5L NC. pt placed on bipap at this time. taken to and returned from priority Ct without incident. pt increasingly anxious, medicated as ordered. Cm in place, ST . pt aware of plan of care. MD Flores at bedside for frequent reassessments.

## 2021-12-22 NOTE — ED ADULT NURSE REASSESSMENT NOTE - NS ED NURSE REASSESS COMMENT FT1
pt admitted to SDU tolerating high flow NC well. o2 sat 96%. pt awaiting inpatient bed at this time.

## 2021-12-22 NOTE — ED ADULT TRIAGE NOTE - CHIEF COMPLAINT QUOTE
BIBEMS from home reporting sudden onset of SOB at rest. PMH of breast CA, first immunotherapy treatment 12/2 with poor reaction, sent to ICU at [facility] and placed on BiPAP r/t pulmonary edema. Patient DC'd and sent home on O2, 3-4L at rest but needing more on exertion. Patient denies COVID exposure. Saturating 90% on RA.

## 2021-12-22 NOTE — ED PROVIDER NOTE - CLINICAL SUMMARY MEDICAL DECISION MAKING FREE TEXT BOX
mutlipanna bedside re delbert performed hemodymaic monitoring and for resp function MICU recs implenteted as well as CT surg recs

## 2021-12-22 NOTE — ED PROVIDER NOTE - CRITICAL CARE ATTENDING CONTRIBUTION TO CARE
I have personally provided _95__ minutes of critical care time exclusive of time spent on separately billable procedures. Time includes review of laboratory data, radiology results, discussion with consultants, and monitoring for potential decompensation. Interventions were performed as documented above

## 2021-12-22 NOTE — ED PROVIDER NOTE - PROGRESS NOTE DETAILS
Donis SCHWARTZ for ED attending, Dr. Flores. Given the significant and immediate threats to this patient based on initial presentation, the benefits of emergency contrast-enhanced CT imaging without obtaining GFR/creatinine serum level results greatly outweigh the potential risk of harm due to contrast-induced nephropathy. Scribe BW for ED attending, Dr. Flores. Will require priority CT, patient is hypoxic with concerns for PE. Continuing to follow closely. Patient is markedly anxious, on xanax, requesting anxiolytic. Will give small dose and monitor for any respiratory depression. Donis SCHWARTZ for ED attending, Dr. Flores. Spoke to ICU PA, will come to evaluate patient. Donis SCHWARTZ for ED attending, Dr. Flores. Spoke to Cardiothoracic PA, will come to evaluate patient.

## 2021-12-22 NOTE — H&P ADULT - HISTORY OF PRESENT ILLNESS
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 Santana), Anxiety, and Muscle Spasms who presents to the ED with complaints of worsening SOB. Patient was recently admitted in [facility] from 12/12-12/13 for SOB and found to have a malignant left sided chest tube requiring a chest tube. Patient states she was discharged on home O2 and was doing relatively well until today when she acutely became more dyspneic. Pulse ox was 84% on 5 liters and patient reports feeling an overwhelming feeling of doom and chest pressure. Denies any recent fevers or chills, but does report a productive cough (intermittently yellow). CT angio was negative for PE, but revealed bilateral effusions (larger on the left), worsening lymphangitic spread and left sided opacities (Atelectasis vs PNA).  In the ED, patient was initially placed on bipap and seen by MICU who switched the patient to Hi-diana and recommended admission to step down unit. Patient seen and examined, reports SOB has improved since initial presentation but visibly appears anxious. Complaining of pleuritic right sided chest pain when coughing and chronic back pain for which she follows with Dr. Santana. Denies lightheadedness, dizziness, chest pain at this time, vomiting, abdominal pain or diarrhea. Otherwise, reports mild nausea and weakness.

## 2021-12-23 DIAGNOSIS — Z90.89 ACQUIRED ABSENCE OF OTHER ORGANS: Chronic | ICD-10-CM

## 2021-12-23 DIAGNOSIS — J90 PLEURAL EFFUSION, NOT ELSEWHERE CLASSIFIED: ICD-10-CM

## 2021-12-23 LAB
ANION GAP SERPL CALC-SCNC: 13 MMOL/L — SIGNIFICANT CHANGE UP (ref 5–17)
B PERT IGG+IGM PNL SER: CLEAR — SIGNIFICANT CHANGE UP
BASOPHILS # BLD AUTO: 0 K/UL — SIGNIFICANT CHANGE UP (ref 0–0.2)
BASOPHILS NFR BLD AUTO: 0 % — SIGNIFICANT CHANGE UP (ref 0–2)
BUN SERPL-MCNC: 6.1 MG/DL — LOW (ref 8–20)
CALCIUM SERPL-MCNC: 9 MG/DL — SIGNIFICANT CHANGE UP (ref 8.6–10.2)
CHLORIDE SERPL-SCNC: 98 MMOL/L — SIGNIFICANT CHANGE UP (ref 98–107)
CO2 SERPL-SCNC: 22 MMOL/L — SIGNIFICANT CHANGE UP (ref 22–29)
COLOR FLD: YELLOW
CREAT SERPL-MCNC: 0.42 MG/DL — LOW (ref 0.5–1.3)
EOSINOPHIL # BLD AUTO: 0 K/UL — SIGNIFICANT CHANGE UP (ref 0–0.5)
EOSINOPHIL NFR BLD AUTO: 0 % — SIGNIFICANT CHANGE UP (ref 0–6)
FLUID INTAKE SUBSTANCE CLASS: SIGNIFICANT CHANGE UP
FLUID SEGMENTED GRANULOCYTES: 3 % — SIGNIFICANT CHANGE UP
GLUCOSE SERPL-MCNC: 170 MG/DL — HIGH (ref 70–99)
HCT VFR BLD CALC: 35.3 % — SIGNIFICANT CHANGE UP (ref 34.5–45)
HGB BLD-MCNC: 11 G/DL — LOW (ref 11.5–15.5)
IMM GRANULOCYTES NFR BLD AUTO: 0.5 % — SIGNIFICANT CHANGE UP (ref 0–1.5)
LYMPHOCYTES # BLD AUTO: 0.59 K/UL — LOW (ref 1–3.3)
LYMPHOCYTES # BLD AUTO: 7.4 % — LOW (ref 13–44)
LYMPHOCYTES # FLD: 92 % — SIGNIFICANT CHANGE UP
MAGNESIUM SERPL-MCNC: 2.2 MG/DL — SIGNIFICANT CHANGE UP (ref 1.6–2.6)
MCHC RBC-ENTMCNC: 27 PG — SIGNIFICANT CHANGE UP (ref 27–34)
MCHC RBC-ENTMCNC: 31.2 GM/DL — LOW (ref 32–36)
MCV RBC AUTO: 86.7 FL — SIGNIFICANT CHANGE UP (ref 80–100)
MESOTHL CELL # FLD: 3 % — SIGNIFICANT CHANGE UP
MONOCYTES # BLD AUTO: 0.05 K/UL — SIGNIFICANT CHANGE UP (ref 0–0.9)
MONOCYTES NFR BLD AUTO: 0.6 % — LOW (ref 2–14)
MONOS+MACROS # FLD: 2 % — SIGNIFICANT CHANGE UP
NEUTROPHILS # BLD AUTO: 7.24 K/UL — SIGNIFICANT CHANGE UP (ref 1.8–7.4)
NEUTROPHILS NFR BLD AUTO: 91.5 % — HIGH (ref 43–77)
PH FLD: 8 — SIGNIFICANT CHANGE UP
PHOSPHATE SERPL-MCNC: 3.9 MG/DL — SIGNIFICANT CHANGE UP (ref 2.4–4.7)
PLATELET # BLD AUTO: 486 K/UL — HIGH (ref 150–400)
POTASSIUM SERPL-MCNC: 4.7 MMOL/L — SIGNIFICANT CHANGE UP (ref 3.5–5.3)
POTASSIUM SERPL-SCNC: 4.7 MMOL/L — SIGNIFICANT CHANGE UP (ref 3.5–5.3)
PROCALCITONIN SERPL-MCNC: 0.09 NG/ML — SIGNIFICANT CHANGE UP (ref 0.02–0.1)
RBC # BLD: 4.07 M/UL — SIGNIFICANT CHANGE UP (ref 3.8–5.2)
RBC # FLD: 14.6 % — HIGH (ref 10.3–14.5)
RCV VOL RI: 1000 /UL — HIGH (ref 0–0)
SODIUM SERPL-SCNC: 133 MMOL/L — LOW (ref 135–145)
TOTAL NUCLEATED CELL COUNT, BODY FLUID: 747 /UL — SIGNIFICANT CHANGE UP
TUBE TYPE: SIGNIFICANT CHANGE UP
WBC # BLD: 7.92 K/UL — SIGNIFICANT CHANGE UP (ref 3.8–10.5)
WBC # FLD AUTO: 7.92 K/UL — SIGNIFICANT CHANGE UP (ref 3.8–10.5)

## 2021-12-23 PROCEDURE — 71045 X-RAY EXAM CHEST 1 VIEW: CPT | Mod: 26

## 2021-12-23 PROCEDURE — 71045 X-RAY EXAM CHEST 1 VIEW: CPT | Mod: 26,77

## 2021-12-23 PROCEDURE — 72128 CT CHEST SPINE W/O DYE: CPT | Mod: 26

## 2021-12-23 PROCEDURE — 99222 1ST HOSP IP/OBS MODERATE 55: CPT | Mod: 25

## 2021-12-23 PROCEDURE — 99233 SBSQ HOSP IP/OBS HIGH 50: CPT

## 2021-12-23 PROCEDURE — ZZZZZ: CPT

## 2021-12-23 PROCEDURE — 99223 1ST HOSP IP/OBS HIGH 75: CPT

## 2021-12-23 PROCEDURE — 32557 INSERT CATH PLEURA W/ IMAGE: CPT

## 2021-12-23 RX ORDER — KETOROLAC TROMETHAMINE 30 MG/ML
15 SYRINGE (ML) INJECTION ONCE
Refills: 0 | Status: DISCONTINUED | OUTPATIENT
Start: 2021-12-23 | End: 2021-12-23

## 2021-12-23 RX ORDER — ACETAMINOPHEN 500 MG
650 TABLET ORAL EVERY 6 HOURS
Refills: 0 | Status: DISCONTINUED | OUTPATIENT
Start: 2021-12-23 | End: 2022-01-03

## 2021-12-23 RX ORDER — ENOXAPARIN SODIUM 100 MG/ML
40 INJECTION SUBCUTANEOUS DAILY
Refills: 0 | Status: DISCONTINUED | OUTPATIENT
Start: 2021-12-24 | End: 2022-01-03

## 2021-12-23 RX ORDER — HYDROMORPHONE HYDROCHLORIDE 2 MG/ML
0.5 INJECTION INTRAMUSCULAR; INTRAVENOUS; SUBCUTANEOUS EVERY 4 HOURS
Refills: 0 | Status: DISCONTINUED | OUTPATIENT
Start: 2021-12-23 | End: 2021-12-29

## 2021-12-23 RX ORDER — HYDROMORPHONE HYDROCHLORIDE 2 MG/ML
1 INJECTION INTRAMUSCULAR; INTRAVENOUS; SUBCUTANEOUS ONCE
Refills: 0 | Status: DISCONTINUED | OUTPATIENT
Start: 2021-12-23 | End: 2021-12-23

## 2021-12-23 RX ORDER — VANCOMYCIN HCL 1 G
1000 VIAL (EA) INTRAVENOUS EVERY 8 HOURS
Refills: 0 | Status: DISCONTINUED | OUTPATIENT
Start: 2021-12-23 | End: 2021-12-24

## 2021-12-23 RX ORDER — MORPHINE SULFATE 50 MG/1
30 CAPSULE, EXTENDED RELEASE ORAL THREE TIMES A DAY
Refills: 0 | Status: DISCONTINUED | OUTPATIENT
Start: 2021-12-23 | End: 2021-12-30

## 2021-12-23 RX ORDER — IPRATROPIUM/ALBUTEROL SULFATE 18-103MCG
3 AEROSOL WITH ADAPTER (GRAM) INHALATION EVERY 6 HOURS
Refills: 0 | Status: DISCONTINUED | OUTPATIENT
Start: 2021-12-23 | End: 2022-01-03

## 2021-12-23 RX ORDER — VANCOMYCIN HCL 1 G
1250 VIAL (EA) INTRAVENOUS EVERY 12 HOURS
Refills: 0 | Status: DISCONTINUED | OUTPATIENT
Start: 2021-12-23 | End: 2021-12-23

## 2021-12-23 RX ORDER — MODAFINIL 200 MG/1
100 TABLET ORAL DAILY
Refills: 0 | Status: DISCONTINUED | OUTPATIENT
Start: 2021-12-23 | End: 2021-12-30

## 2021-12-23 RX ORDER — LIDOCAINE 4 G/100G
1 CREAM TOPICAL ONCE
Refills: 0 | Status: COMPLETED | OUTPATIENT
Start: 2021-12-23 | End: 2021-12-23

## 2021-12-23 RX ORDER — PIPERACILLIN AND TAZOBACTAM 4; .5 G/20ML; G/20ML
3.38 INJECTION, POWDER, LYOPHILIZED, FOR SOLUTION INTRAVENOUS ONCE
Refills: 0 | Status: COMPLETED | OUTPATIENT
Start: 2021-12-23 | End: 2021-12-23

## 2021-12-23 RX ORDER — PIPERACILLIN AND TAZOBACTAM 4; .5 G/20ML; G/20ML
3.38 INJECTION, POWDER, LYOPHILIZED, FOR SOLUTION INTRAVENOUS EVERY 8 HOURS
Refills: 0 | Status: DISCONTINUED | OUTPATIENT
Start: 2021-12-23 | End: 2021-12-27

## 2021-12-23 RX ORDER — ESCITALOPRAM OXALATE 10 MG/1
5 TABLET, FILM COATED ORAL DAILY
Refills: 0 | Status: DISCONTINUED | OUTPATIENT
Start: 2021-12-23 | End: 2022-01-03

## 2021-12-23 RX ORDER — HYDROMORPHONE HYDROCHLORIDE 2 MG/ML
1 INJECTION INTRAMUSCULAR; INTRAVENOUS; SUBCUTANEOUS EVERY 4 HOURS
Refills: 0 | Status: DISCONTINUED | OUTPATIENT
Start: 2021-12-23 | End: 2021-12-30

## 2021-12-23 RX ADMIN — HYDROMORPHONE HYDROCHLORIDE 1 MILLIGRAM(S): 2 INJECTION INTRAMUSCULAR; INTRAVENOUS; SUBCUTANEOUS at 12:39

## 2021-12-23 RX ADMIN — HYDROMORPHONE HYDROCHLORIDE 1 MILLIGRAM(S): 2 INJECTION INTRAMUSCULAR; INTRAVENOUS; SUBCUTANEOUS at 19:27

## 2021-12-23 RX ADMIN — HYDROMORPHONE HYDROCHLORIDE 0.5 MILLIGRAM(S): 2 INJECTION INTRAMUSCULAR; INTRAVENOUS; SUBCUTANEOUS at 03:04

## 2021-12-23 RX ADMIN — LIDOCAINE 1 PATCH: 4 CREAM TOPICAL at 22:14

## 2021-12-23 RX ADMIN — Medication 0.5 MILLIGRAM(S): at 01:15

## 2021-12-23 RX ADMIN — PIPERACILLIN AND TAZOBACTAM 200 GRAM(S): 4; .5 INJECTION, POWDER, LYOPHILIZED, FOR SOLUTION INTRAVENOUS at 04:30

## 2021-12-23 RX ADMIN — Medication 0.5 MILLIGRAM(S): at 17:42

## 2021-12-23 RX ADMIN — ESCITALOPRAM OXALATE 5 MILLIGRAM(S): 10 TABLET, FILM COATED ORAL at 12:40

## 2021-12-23 RX ADMIN — Medication 250 MILLIGRAM(S): at 22:51

## 2021-12-23 RX ADMIN — Medication 250 MILLIGRAM(S): at 04:30

## 2021-12-23 RX ADMIN — MORPHINE SULFATE 30 MILLIGRAM(S): 50 CAPSULE, EXTENDED RELEASE ORAL at 23:00

## 2021-12-23 RX ADMIN — MORPHINE SULFATE 30 MILLIGRAM(S): 50 CAPSULE, EXTENDED RELEASE ORAL at 14:49

## 2021-12-23 RX ADMIN — HYDROMORPHONE HYDROCHLORIDE 1 MILLIGRAM(S): 2 INJECTION INTRAMUSCULAR; INTRAVENOUS; SUBCUTANEOUS at 20:28

## 2021-12-23 RX ADMIN — HYDROMORPHONE HYDROCHLORIDE 1 MILLIGRAM(S): 2 INJECTION INTRAMUSCULAR; INTRAVENOUS; SUBCUTANEOUS at 21:00

## 2021-12-23 RX ADMIN — Medication 15 MILLIGRAM(S): at 20:35

## 2021-12-23 RX ADMIN — HYDROMORPHONE HYDROCHLORIDE 0.5 MILLIGRAM(S): 2 INJECTION INTRAMUSCULAR; INTRAVENOUS; SUBCUTANEOUS at 17:13

## 2021-12-23 RX ADMIN — HYDROMORPHONE HYDROCHLORIDE 0.5 MILLIGRAM(S): 2 INJECTION INTRAMUSCULAR; INTRAVENOUS; SUBCUTANEOUS at 09:12

## 2021-12-23 RX ADMIN — Medication 3 MILLILITER(S): at 16:49

## 2021-12-23 RX ADMIN — Medication 15 MILLIGRAM(S): at 21:00

## 2021-12-23 RX ADMIN — MORPHINE SULFATE 30 MILLIGRAM(S): 50 CAPSULE, EXTENDED RELEASE ORAL at 06:12

## 2021-12-23 RX ADMIN — Medication 250 MILLIGRAM(S): at 14:48

## 2021-12-23 RX ADMIN — HYDROMORPHONE HYDROCHLORIDE 0.5 MILLIGRAM(S): 2 INJECTION INTRAMUSCULAR; INTRAVENOUS; SUBCUTANEOUS at 16:00

## 2021-12-23 RX ADMIN — MORPHINE SULFATE 30 MILLIGRAM(S): 50 CAPSULE, EXTENDED RELEASE ORAL at 22:14

## 2021-12-23 RX ADMIN — Medication 3 MILLILITER(S): at 12:37

## 2021-12-23 RX ADMIN — MODAFINIL 100 MILLIGRAM(S): 200 TABLET ORAL at 12:25

## 2021-12-23 RX ADMIN — MORPHINE SULFATE 30 MILLIGRAM(S): 50 CAPSULE, EXTENDED RELEASE ORAL at 17:13

## 2021-12-23 RX ADMIN — HYDROMORPHONE HYDROCHLORIDE 1 MILLIGRAM(S): 2 INJECTION INTRAMUSCULAR; INTRAVENOUS; SUBCUTANEOUS at 04:09

## 2021-12-23 RX ADMIN — PIPERACILLIN AND TAZOBACTAM 25 GRAM(S): 4; .5 INJECTION, POWDER, LYOPHILIZED, FOR SOLUTION INTRAVENOUS at 14:49

## 2021-12-23 RX ADMIN — HYDROMORPHONE HYDROCHLORIDE 1 MILLIGRAM(S): 2 INJECTION INTRAMUSCULAR; INTRAVENOUS; SUBCUTANEOUS at 19:09

## 2021-12-23 RX ADMIN — Medication 12.5 MILLIGRAM(S): at 01:16

## 2021-12-23 RX ADMIN — Medication 0.5 MILLIGRAM(S): at 09:10

## 2021-12-23 RX ADMIN — MORPHINE SULFATE 30 MILLIGRAM(S): 50 CAPSULE, EXTENDED RELEASE ORAL at 06:42

## 2021-12-23 RX ADMIN — HYDROMORPHONE HYDROCHLORIDE 1 MILLIGRAM(S): 2 INJECTION INTRAMUSCULAR; INTRAVENOUS; SUBCUTANEOUS at 23:20

## 2021-12-23 RX ADMIN — HYDROMORPHONE HYDROCHLORIDE 1 MILLIGRAM(S): 2 INJECTION INTRAMUSCULAR; INTRAVENOUS; SUBCUTANEOUS at 04:39

## 2021-12-23 RX ADMIN — HYDROMORPHONE HYDROCHLORIDE 1 MILLIGRAM(S): 2 INJECTION INTRAMUSCULAR; INTRAVENOUS; SUBCUTANEOUS at 00:25

## 2021-12-23 RX ADMIN — PIPERACILLIN AND TAZOBACTAM 25 GRAM(S): 4; .5 INJECTION, POWDER, LYOPHILIZED, FOR SOLUTION INTRAVENOUS at 22:16

## 2021-12-23 RX ADMIN — Medication 3 MILLILITER(S): at 21:51

## 2021-12-23 NOTE — CONSULT NOTE ADULT - SUBJECTIVE AND OBJECTIVE BOX
Pt Name: NATIVIDAD MYERS    MRN: [id number]      Patient is a 36y Female presenting to the emergency department with a chief complaint of SOB. Patient had CTA done in ED to r/o pulmonary embolism, new findings in thoracic spine noted. Patient has h/o metastatic breast CA with bony mets, follows with Dr Santana for her compression fractures. Pt had CT scans of cervical thoracic and lumbar spine done 11/3/2021 with lumbar and thoracic MRI as well. Pt reports that she was supposed to get repeat imaging done outpatient this month to evaluate stability of compression fractures. Patient ambulates without assistance, denies any new onset weaknesses. Pt states she has back pain "all the time" and that it is no worse than usual. Pt denies any new trauma, falls, motor sensory changes, bladder or bowel dysfunction.       HEALTH ISSUES - PROBLEM Dx:      PAST MEDICAL & SURGICAL HISTORY:  PAST MEDICAL & SURGICAL HISTORY:  Breast CA    H/O compression fracture of spine    Anxiety    S/P tonsillectomy        Allergies: Perjeta (Other (Severe))  pertuzumab (Other (Severe))      Medications: acetaminophen     Tablet .. 650 milliGRAM(s) Oral every 6 hours PRN  albuterol/ipratropium for Nebulization 3 milliLiter(s) Nebulizer every 6 hours PRN  Carisoprodol 250 milliGRAM(s) 250 milliGRAM(s) Oral four times a day PRN  escitalopram 5 milliGRAM(s) Oral daily  guaiFENesin Oral Liquid (Sugar-Free) 200 milliGRAM(s) Oral every 6 hours PRN  HYDROmorphone  Injectable 1 milliGRAM(s) IV Push every 4 hours PRN  HYDROmorphone  Injectable 0.5 milliGRAM(s) IV Push every 4 hours PRN  LORazepam   Injectable 0.5 milliGRAM(s) IV Push three times a day PRN  modafinil 100 milliGRAM(s) Oral daily  morphine ER Tablet 30 milliGRAM(s) Oral three times a day  piperacillin/tazobactam IVPB.. 3.375 Gram(s) IV Intermittent every 8 hours  promethazine 12.5 milliGRAM(s) Oral two times a day PRN  vancomycin  IVPB 1000 milliGRAM(s) IV Intermittent every 8 hours      FAMILY HISTORY:  FH: CVA (cerebrovascular accident)    : non-contributory    Social History:                           11.7   9.43  )-----------( 470      ( 22 Dec 2021 20:59 )             36.4     12-22    136  |  100  |  7.5<L>  ----------------------------<  93  4.2   |  21.0<L>  |  0.40<L>    Ca    8.7      22 Dec 2021 20:59        PHYSICAL EXAM:    Vital Signs Last 24 Hrs  T(C): 36.3 (22 Dec 2021 19:40), Max: 36.3 (22 Dec 2021 19:40)  T(F): 97.4 (22 Dec 2021 19:40), Max: 97.4 (22 Dec 2021 19:40)  HR: 113 (23 Dec 2021 01:25) (113 - 132)  BP: 140/84 (23 Dec 2021 01:25) (139/72 - 140/91)  BP(mean): 100 (22 Dec 2021 21:01) (100 - 100)  RR: 24 (23 Dec 2021 01:25) (22 - 28)  SpO2: 96% (23 Dec 2021 01:25) (90% - 100%)  Daily Height in cm: 154.94 (22 Dec 2021 19:40)    Daily     Appearance: Alert, responsive, in no acute distress.    Skin: no rash on visible skin. Skin is clean, dry and intact. No bleeding. No abrasions. No ulcerations.    Vascular: 2+ distal pulses. Cap refill < 2 sec. No signs of venous  insufficiency  or stasis. No extremity ulcerations. No cyanosis.    Musculoskeletal:         Left Upper Extremity: Atraumatic with normal alignment NROM. No crepitus. No bony tenderness.        Right Upper Extremity: Atraumatic with normal alignment NROM. No crepitus. No bony tenderness.        Left Lower Extremity: Atraumatic with normal alignment NROM. No crepitus. No bony tenderness.        Right Lower Extremity: Atraumatic with normal alignment NROM. No crepitus. No bony tenderness.     Neurological: Sensation is grossly intact to light touch. No focal deficits or weaknesses found.    Pathologic reflexes: negative        Motor exam:         Upper extremities -   5/5 C5-T1 bilaterally without deficit           Lower extremities  -   5/5 L2-S1 bilaterally without deficit    Imaging Studies:    < from: CT Angio Chest PE Protocol w/ IV Cont (12.22.21 @ 20:56) >    ACC: [id number] EXAM:  CT ANGIO CHEST PULM [facility]                          PROCEDURE DATE:  12/22/2021          INTERPRETATION:  CLINICAL INFORMATION: Shortness of breath. History of   breast cancer with metastatic disease. Hypoxic. Normal-appearing.    COMPARISON: CT chest from 11/3/2021 and PET scan 11/30/2021    CONTRAST/COMPLICATIONS:  IV Contrast: Omnipaque 350  54 cc administered   66 cc discarded  Oral Contrast: NONE  Complications: None reported at time of study completion    TECHNIQUE:  CT pulmonary angiography was performed of the chest according   to standard institutional protocol. Coronal, sagittal and transaxial 3-D   MIP reformatted images are provided from the transaxial source data.    FINDINGS:  There is good opacification of pulmonary arterial tree, however, there is   motion artifact present which degrades image quality and limits   evaluation of the subsegmental and peripheral vasculature. No central,   lobar or segmental filling defect is present to suggest acute pulmonary   embolus.    The thyroid gland is unremarkable.    There are bilateral pleural effusions, small-to-moderate on the left and   small on the right, slightly increased on the right compared to the prior   PET scan. Evaluation of the lung parenchyma demonstrates increased   bilateral airspace opacities and interlobular septal thickening from the   recent PET scan. New airspace consolidation in the left lower and left   upper lobe could be due to superimposed atelectasis.    Metastatic adenopathy in the mediastinum, right subpectoral and axillary   regions appear grossly unchanged compared to the recent PET scan.    The heart size is normal. Interval increased size of small pericardial   effusion. The thoracic aorta is normal in caliber without dissection.    Limited images through the upper abdomen again demonstrate bilobar   bilobar liver metastasis, grossly unchanged. Adenopathy in the upper   abdomen and retroperitoneum. Stable as well.    There is diffuse osseous metastatic disease again seen. There is a new   pathologic compression fracture deformity at T2 with mild retropulsion of   bone into the epidural space. New mild compression fracture deformities   is seen at T7 and mildly increased at T8, T9 and T10. Stable pathologic   compression fracture deformities at T12 and L1. Skin thickening and edema   in the right breast, unchanged. Right chest wall [id number] catheter,   unchanged.    IMPRESSION:  Limited due to respiratory motion. No gross central, lobar or segmental   pulmonary embolism.    Stable small to moderate left and mildly increased small right pleural   effusions. Interval worsening of lymphangitic spread of disease within   the lungs. Superimposed pulmonary edema and/or atypical infection not   excluded. New left upper and lower lobe areas of airspace consolidation   could be due to atelectasis and/or pneumonia.    Mildly increased small pericardial effusion.    Osseous metastatic disease with new pathologic compression fracture   deformities at T2 and T7. Mild retropulsion of bone at T2.    --- End of Report ---      SULEMAN ARRINGTON MD; Attending Radiologist  This document has been electronically signed. Dec 22 2021  9:17PM    < end of copied text >    A/P:  Pt is a  36y Female with acute T2 and T7 compression fractures, bony mets    PLAN:  * Pain control  * CT scan lumbar and cervical spine ordered  * MRI cervical thoracic lumbar ordered  * Treatment plan to be finalized after review of pending imaging studies

## 2021-12-23 NOTE — CONSULT NOTE ADULT - TIME BILLING
reviewing labs, notes, orders, radiographic studies, as well as counseling and coordinating care with the relevant multidisciplinary team, including with the primary and consulting providers.
I attest that I have seen and examined the patient. 80 min of time was spent interviewing the patient, reviewing chart/films/ECHO, speaking with consults about ECHO, and answering all questions and concerns from patient and family members.

## 2021-12-23 NOTE — CONSULT NOTE ADULT - ASSESSMENT
37 y/o F w/ a PMHx of metastatic breast CA (dx 10/2021) admitted w/:    1. Acute hypoxic respiratory failure    PLAN:  - Aggressive anxiety control.  - Transitioned from BiPAP to HFNC (40lpm/80%). Tolerating well. Maintain SpO2 > 92%.  - Suspect respiratory distress is 2/2 worsening lymphangitic spread in lungs, but will recommend treating for superimposed bacterial pneumonia given immunocompromised state.   - Steroids plus nebulizer treatments.  - Palliative care consult.    At this time, pt does not require ICU level of care and can be admitted to step down unit. Please re-consult should pt's condition deteriorate.    Case discussed w/ ICU attending, Dr. Melo.

## 2021-12-23 NOTE — PATIENT PROFILE ADULT - STATED REASON FOR ADMISSION
I had sudden onset of shortness of breath  and my pulse ox was only reading 85% which was not getting better no matter what I did.

## 2021-12-23 NOTE — CONSULT NOTE ADULT - ATTENDING COMMENTS
Patient seen and examined. 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 She tells me she as recently had a left pleural effusion drained at [facility] about 2 weeks ago which confirmed malignant cells in the fluid. Currently, she is on 60% fio2 high flow nasal cannula. She is using her accessory respiratory muscles to breathe. She is okay at rest but gets extremely SOB with activity. She denies any hemoptysis, productive cough, fevers, chills.     She has not yet started treatment for her cancer. CT chest reviewed, shows extensive bilateral infiltrates (presume cancer versus superimposed pneumonia), moderate left pleural effusion, small pericardial effusion.     After extensive discussion, we think its best to stabilize her breathing first. Recommended a pigtail catheter for effusion. PleurX catheter prior to discharge from the hospital. Once we get her breathing stabilized, we can address the pericardial window. If we intubate her now for pericardial window, there is a high likely ruiz that she will not be extubated and will likely have a prolonged ICU course.     Plan to drain the left pleural effusion first, repeat ECHO. If really fibrinous material around heart and not fluid, doing a window will not improve anything. Want to also make sure that the left pleural effusion is not being confused with large pericardial effusion.     Patient understands the medical decision making and is agreeable to the plan.

## 2021-12-23 NOTE — PATIENT PROFILE ADULT - FALL HARM RISK - HARM RISK INTERVENTIONS

## 2021-12-23 NOTE — CONSULT NOTE ADULT - SUBJECTIVE AND OBJECTIVE BOX
PULMONARY CONSULT NOTE      NATIVIDAD MYERS  MRN-[id number]    Patient is a 36y old  Female who presents with a chief complaint of SOB (23 Dec 2021 04:48)      HISTORY OF PRESENT ILLNESS:  36F PMH recently dx metastatic breast CA (mets to spine, liver, lung) with malignant pleural effusion, pathologic compression fractures who presents with worsening SOB. She was at [facility] from 12/12-13 for SOB, had a L sided PTC placed for pleural effusion, and cytopathology was positive for malignant cells. Had CTPA that was negative for PE but showed worsening lymphangitic spread vs PNA. She required NIPPV on the ED and was weaned down to HFNC. She also has back pain. She was recently started on chemotherapy. She is a never smoker. No fevers, no chills, no N/V/D.     MEDICATIONS  (STANDING):  escitalopram 5 milliGRAM(s) Oral daily  modafinil 100 milliGRAM(s) Oral daily  morphine ER Tablet 30 milliGRAM(s) Oral three times a day  piperacillin/tazobactam IVPB.. 3.375 Gram(s) IV Intermittent every 8 hours  vancomycin  IVPB 1000 milliGRAM(s) IV Intermittent every 8 hours    MEDICATIONS  (PRN):  acetaminophen     Tablet .. 650 milliGRAM(s) Oral every 6 hours PRN Temp greater or equal to 38C (100.4F), Mild Pain (1 - 3)  albuterol/ipratropium for Nebulization 3 milliLiter(s) Nebulizer every 6 hours PRN Shortness of Breath and/or Wheezing  Carisoprodol 250 milliGRAM(s) 250 milliGRAM(s) Oral four times a day PRN muscle spasm  guaiFENesin Oral Liquid (Sugar-Free) 200 milliGRAM(s) Oral every 6 hours PRN Cough  HYDROmorphone  Injectable 1 milliGRAM(s) IV Push every 4 hours PRN Severe Pain (7 - 10)  HYDROmorphone  Injectable 0.5 milliGRAM(s) IV Push every 4 hours PRN Moderate Pain (4 - 6)  LORazepam   Injectable 0.5 milliGRAM(s) IV Push three times a day PRN Anxiety  promethazine 12.5 milliGRAM(s) Oral two times a day PRN nausea    Allergies    Perjeta (Other (Severe))  pertuzumab (Other (Severe))    Intolerances      PAST MEDICAL & SURGICAL HISTORY:  Breast CA    H/O compression fracture of spine    Anxiety    S/P tonsillectomy      FAMILY HISTORY:  FH: CVA (cerebrovascular accident)          SOCIAL HISTORY  Smoking History:   Never smoker    REVIEW OF SYSTEMS:  Negative except as noted in HPI      Vital Signs Last 24 Hrs  T(C): 37.1 (23 Dec 2021 08:57), Max: 37.1 (23 Dec 2021 08:57)  T(F): 98.8 (23 Dec 2021 08:57), Max: 98.8 (23 Dec 2021 08:57)  HR: 117 (23 Dec 2021 12:22) (91 - 132)  BP: 119/77 (23 Dec 2021 12:22) (119/77 - 155/91)  BP(mean): 91 (23 Dec 2021 12:22) (91 - 112)  RR: 24 (23 Dec 2021 12:22) (21 - 28)  SpO2: 95% (23 Dec 2021 12:22) (90% - 100%)      PHYSICAL EXAMINATION:  GENERAL: In no apparent distress  HEENT: NC/AT  NECK: Supple  LUNGS: B/L coarse breath sounds, good inspiratory effort, non-labored breathing  CV: +S1, S2  ABDOMEN: Soft, non-tender  EXTREMITIES: No rashes, lesions, edema  NEUROLOGIC: Grossly non-focal  PSYCH: Normal affect      LABS:                        11.0   7.92  )-----------( 486      ( 23 Dec 2021 06:51 )             35.3     12-23    133<L>  |  98  |  6.1<L>  ----------------------------<  170<H>  4.7   |  22.0  |  0.42<L>    Ca    9.0      23 Dec 2021 06:51  Phos  3.9     12-23  Mg     2.2     12-23      PT/INR - ( 22 Dec 2021 20:59 )   PT: 15.5 sec;   INR: 1.36 ratio         PTT - ( 22 Dec 2021 20:59 )  PTT:36.7 sec      CARDIAC MARKERS ( 22 Dec 2021 20:59 )  x     / <0.01 ng/mL / x     / x     / x            Serum Pro-Brain Natriuretic Peptide: 58 pg/mL (12-22-21 @ 20:59)      Procalcitonin, Serum: 0.09 ng/mL (12-23-21 @ 01:02)      MICROBIOLOGY:  COVID-19 PCR . (11.09.21 @ 16:23)    COVID-19 PCR: NotDetec: You can help in the fight against COVID-19. [facility] may contact  you to see if you are interested in voluntarily participating in one of  our clinical trials.  Testing is performed using polymerase chain reaction (PCR) or  transcription mediated amplification (TMA). This COVID-19 (SARS-CoV-2)  nucleic acid amplification test was validated by [facility] and is  in use under the FDA Emergency Use Authorization (EUA) for clinical labs  CLIA-certified to perform high complexity testing. Test results should be  correlated with clinical presentation, patient history, and epidemiology.          RADIOLOGY & ADDITIONAL STUDIES:  < from: CT Angio Chest PE Protocol w/ IV Cont (12.22.21 @ 20:56) >  ACC: [id number] EXAM:  CT ANGIO CHEST PULM [facility]                          PROCEDURE DATE:  12/22/2021          INTERPRETATION:  CLINICAL INFORMATION: Shortness of breath. History of   breast cancer with metastatic disease. Hypoxic. Normal-appearing.    COMPARISON: CT chest from 11/3/2021 and PET scan 11/30/2021    CONTRAST/COMPLICATIONS:  IV Contrast: Omnipaque 350  54 cc administered   66 cc discarded  Oral Contrast: NONE  Complications: None reported at time of study completion    TECHNIQUE:  CT pulmonary angiography was performed of the chest according   to standard institutional protocol. Coronal, sagittal and transaxial 3-D   MIP reformatted images are provided from the transaxial source data.    FINDINGS:  There is good opacification of pulmonary arterial tree, however, there is   motion artifact present which degrades image quality and limits   evaluation of the subsegmental and peripheral vasculature. No central,   lobar or segmental filling defect is present to suggest acute pulmonary   embolus.    The thyroid gland is unremarkable.    There are bilateral pleural effusions, small-to-moderate on the left and   small on the right, slightly increased on the right compared to the prior   PET scan. Evaluation of the lung parenchyma demonstrates increased   bilateral airspace opacities and interlobular septal thickening from the   recent PET scan. New airspace consolidation in the left lower and left   upper lobe could be due to superimposed atelectasis.    Metastatic adenopathy in the mediastinum, right subpectoral and axillary   regions appear grossly unchanged compared to the recent PET scan.    The heart size is normal. Interval increased size of small pericardial   effusion. The thoracic aorta is normal in caliber without dissection.    Limited images through the upper abdomen again demonstrate bilobar   bilobar liver metastasis, grossly unchanged. Adenopathy in the upper   abdomen and retroperitoneum. Stable as well.    There is diffuse osseous metastatic disease again seen. There is a new   pathologic compression fracture deformity at T2 with mild retropulsion of   bone into the epidural space. New mild compression fracture deformities   is seen at T7 and mildly increased at T8, T9 and T10. Stable pathologic   compression fracture deformities at T12 and L1. Skin thickening and edema   in the right breast, unchanged. Right chest wall [id number] catheter,   unchanged.    IMPRESSION:  Limited due to respiratory motion. No gross central, lobar or segmental   pulmonary embolism.    Stable small to moderate left and mildly increased small right pleural   effusions. Interval worsening of lymphangitic spread of disease within   the lungs. Superimposed pulmonary edema and/or atypical infection not   excluded. New left upper and lower lobe areas of airspace consolidation   could be due to atelectasis and/or pneumonia.    Mildly increased small pericardial effusion.    Osseous metastatic disease with new pathologic compression fracture   deformities at T2 and T7. Mild retropulsion of bone at T2.    --- End of Report ---            SULEMAN ARRINGTON MD; Attending Radiologist  This document has been electronically signed. Dec 22 2021  9:17PM    < end of copied text >      ECHO:

## 2021-12-23 NOTE — CONSULT NOTE ADULT - SUBJECTIVE AND OBJECTIVE BOX
[facility]                                   [address] [place] [address]                                                      PHONE: [phone]                                                         FAX: [phone]  [email or web address]  ---------------------------------------------------------------------------------------------------------------------------------    Reason for Consult: pericardial effusion     HPI:  NATIVIDAD MYERS is an 36y Female with recently diagnosed Metastatic Right Breast CA (Infiltrating Ductal Carcinoma with mets to the Spine and Liver), Malignant Pleural Effusion on Home O2 (3-5 liters), Pathologic Compression Fractures (Follows with Dr. Santana), Anxiety, who presents to [facility] due to complaints of worsening SOB. Patient was recently admitted in [facility] [place] from 12/12-12/13 for SOB and found to have a malignant left sided chest tube requiring a chest tube. Patient states she was discharged on home O2 and was doing relatively well until today when she acutely became more dyspneic. Pulse ox was 84% on 5 liters and patient reports feeling an overwhelming feeling of doom and chest pressure. CT angio was negative for PE, but revealed bilateral effusions (larger on the left), worsening lymphangitic spread and left sided opacities (Atelectasis vs PNA).  In the ED, patient was initially placed on bipap and seen by MICU who switched the patient to Hi-diana and recommended admission to step down unit. TTTE showed Moderate pericardial effusion with fibrinous layering material, effusion size largest area measured up to 1.7 cm anterior to the RV. There is diastolic RV chamber collapse however without other typical tamponade features but may still represent early echocardiographic evidence of tamponade. Currently denies any new complaints       PAST MEDICAL & SURGICAL HISTORY:  Breast CA    H/O compression fracture of spine    Anxiety    S/P tonsillectomy        Perjeta (Other (Severe))  pertuzumab (Other (Severe))      MEDICATIONS  (STANDING):  escitalopram 5 milliGRAM(s) Oral daily  modafinil 100 milliGRAM(s) Oral daily  morphine ER Tablet 30 milliGRAM(s) Oral three times a day  piperacillin/tazobactam IVPB.. 3.375 Gram(s) IV Intermittent every 8 hours  vancomycin  IVPB 1000 milliGRAM(s) IV Intermittent every 8 hours    MEDICATIONS  (PRN):  acetaminophen     Tablet .. 650 milliGRAM(s) Oral every 6 hours PRN Temp greater or equal to 38C (100.4F), Mild Pain (1 - 3)  albuterol/ipratropium for Nebulization 3 milliLiter(s) Nebulizer every 6 hours PRN Shortness of Breath and/or Wheezing  Carisoprodol 250 milliGRAM(s) 250 milliGRAM(s) Oral four times a day PRN muscle spasm  guaiFENesin Oral Liquid (Sugar-Free) 200 milliGRAM(s) Oral every 6 hours PRN Cough  HYDROmorphone  Injectable 1 milliGRAM(s) IV Push every 4 hours PRN Severe Pain (7 - 10)  HYDROmorphone  Injectable 0.5 milliGRAM(s) IV Push every 4 hours PRN Moderate Pain (4 - 6)  LORazepam   Injectable 0.5 milliGRAM(s) IV Push three times a day PRN Anxiety  promethazine 12.5 milliGRAM(s) Oral two times a day PRN nausea      Social History:  Cigarettes:         none            Alchohol:        none          Illicit Drug Abuse:  none     ROS: Negative other than as mentioned in HPI.    Vital Signs Last 24 Hrs  T(C): 36.8 (23 Dec 2021 14:51), Max: 37.1 (23 Dec 2021 08:57)  T(F): 98.2 (23 Dec 2021 14:51), Max: 98.8 (23 Dec 2021 08:57)  HR: 106 (23 Dec 2021 14:51) (91 - 132)  BP: 138/77 (23 Dec 2021 14:51) (119/77 - 155/91)  BP(mean): 97 (23 Dec 2021 14:51) (91 - 112)  RR: 20 (23 Dec 2021 14:51) (20 - 28)  SpO2: 93% (23 Dec 2021 14:51) (90% - 100%)  ICU Vital Signs Last 24 Hrs  NATIVIDAD MYERS  I&O's Detail    22 Dec 2021 07:01  -  23 Dec 2021 07:00  --------------------------------------------------------  IN:    IV PiggyBack: 250 mL    IV PiggyBack: 100 mL    Oral Fluid: 240 mL  Total IN: 590 mL    OUT:    Voided (mL): 750 mL  Total OUT: 750 mL    Total NET: -160 mL        I&O's Summary    22 Dec 2021 07:01  -  23 Dec 2021 07:00  --------------------------------------------------------  IN: 590 mL / OUT: 750 mL / NET: -160 mL      Drug Dosing Weight  NATIVIDAD MYERS      PHYSICAL EXAM:  General: Appears well developed, alert and cooperative.  HEENT: Head; normocephalic, atraumatic.  Eyes: Pupils reactive, cornea wnl.  Neck: Supple, no nodes adenopathy, no NVD or carotid bruit or thyromegaly.  CARDIOVASCULAR: Normal S1 and S2, 1/6 murmur, rub, gallop or lift.   LUNGS: Decrease BS B/L   ABDOMEN: Soft, nontender without mass or organomegaly. bowel sounds normoactive.  EXTREMITIES: No clubbing, cyanosis or edema. Distal pulses wnl.   SKIN: warm and dry with normal turgor.  NEURO: Alert/oriented x 3/normal motor exam. No pathologic reflexes.    PSYCH: normal affect.        LABS:                        11.0   7.92  )-----------( 486      ( 23 Dec 2021 06:51 )             35.3     12-23    133<L>  |  98  |  6.1<L>  ----------------------------<  170<H>  4.7   |  22.0  |  0.42<L>    Ca    9.0      23 Dec 2021 06:51  Phos  3.9     12-23  Mg     2.2     12-23      NATIVIDAD MYERS  CARDIAC MARKERS ( 22 Dec 2021 20:59 )  x     / <0.01 ng/mL / x     / x     / x          PT/INR - ( 22 Dec 2021 20:59 )   PT: 15.5 sec;   INR: 1.36 ratio         PTT - ( 22 Dec 2021 20:59 )  PTT:36.7 sec      RADIOLOGY & ADDITIONAL STUDIES:    INTERPRETATION OF TELEMETRY (personally reviewed):    ECG:  < from: 12 Lead ECG (12.22.21 @ 21:24) >  Diagnosis Line   Sinus tachycardia        Confirmed by CARMELO MOHAMUD ([id number]) on 12/23/2021 10:46:48 AM    < end of copied text >      ECHO:  < from: TTE Echo Limited or F/U (12.23.21 @ 11:05) >  Summary:   1. Technically difficult study.   2. Left ventricular ejection fraction, by visual estimation, is 65 to   70%.   3. Normal right ventricular size and function, inadequate estimation of   RVSP.   4. Trace mitral valve regurgitation.   5. Moderate pericardial effusion with fibrinous layering material,   effusion size largest area measured up to 1.7 cm anterior to the RV.   There is diastolic RV chamber collapse however without other typical   tamponade features but may still represent early echocardiographic   evidence of tamponade.   6. Moderate pleuraleffusion in the left lateral region.   7. No priro study is available for comparison. Recommend short interval   repeat study to reassess size/hemodynamic of pericardial effusion.    Moe Rivera DO Electronically signed on 12/23/2021 at 2:42:27 PM        *** Final ***        JANETTE ANDRADE   This document has been electronically signed. Dec 23 2021 11:05AM    < end of copied text >      < from: CT Angio Chest PE Protocol w/ IV Cont (12.22.21 @ 20:56) >  FINDINGS:  There is good opacification of pulmonary arterial tree, however, there is   motion artifact present which degrades image quality and limits   evaluation of the subsegmental and peripheral vasculature. No central,   lobar or segmental filling defect is present to suggest acute pulmonary   embolus.    The thyroid gland is unremarkable.    There are bilateral pleural effusions, small-to-moderate on the left and   small on the right, slightly increased on the right compared to the prior   PET scan. Evaluation of the lung parenchyma demonstrates increased   bilateral airspace opacities and interlobular septal thickening from the   recent PET scan. New airspace consolidation in the left lower and left   upper lobe could be due to superimposed atelectasis.    Metastatic adenopathy in the mediastinum, right subpectoral and axillary   regions appear grossly unchanged compared to the recent PET scan.    The heart size is normal. Interval increased size of small pericardial   effusion. The thoracic aorta is normal in caliber without dissection.    Limited images through the upper abdomen again demonstrate bilobar   bilobar liver metastasis, grossly unchanged. Adenopathy in the upper   abdomen and retroperitoneum. Stable as well.    There is diffuse osseous metastatic disease again seen. There is a new   pathologic compression fracture deformity at T2 with mild retropulsion of   bone into the epidural space. New mild compression fracture deformities   is seen at T7 and mildly increased at T8, T9 and T10. Stable pathologic   compression fracture deformities at T12 and L1. Skin thickening and edema   in the right breast, unchanged. Right chest wall Iomanw-a-Naly catheter,   unchanged.    IMPRESSION:  Limited due to respiratory motion. No gross central, lobar or segmental   pulmonary embolism.    Stable small to moderate left and mildly increased small right pleural   effusions. Interval worsening of lymphangitic spread of disease within   the lungs. Superimposed pulmonary edema and/or atypical infection not   excluded. New left upper and lower lobe areas of airspace consolidation   could be due to atelectasis and/or pneumonia.    Mildly increased small pericardial effusion.    Osseous metastatic disease with new pathologic compression fracture   deformities at T2 and T7. Mild retropulsion of bone at T2.    < end of copied text >      Assessment and Plan:  In summary, NATIVIDAD MYERS is an 36y Female with past medical history significant for recently diagnosed Metastatic Right Breast CA (Infiltrating Ductal Carcinoma with mets to the Spine and Liver), Malignant Pleural Effusion on Home O2 (3-5 liters), Pathologic Compression Fractures (Follows with Dr. Santana), Anxiety, who presents to [facility] due to complaints of worsening SOB. Patient was recently admitted in [facility] [place] from 12/12-12/13 for SOB and found to have a malignant left sided chest tube requiring a chest tube. Patient states she was discharged on home O2 and was doing relatively well until today when she acutely became more dyspneic. Pulse ox was 84% on 5 liters and patient reports feeling an overwhelming feeling of doom and chest pressure. CT angio was negative for PE, but revealed bilateral effusions (larger on the left), worsening lymphangitic spread and left sided opacities (Atelectasis vs PNA).  In the ED, patient was initially placed on bipap and seen by MICU who switched the patient to Hi-diana and recommended admission to step down unit. TTTE showed Moderate pericardial effusion with fibrinous layering material, effusion size largest area measured up to 1.7 cm anterior to the RV. There is diastolic RV chamber collapse however without other typical tamponade features but may still represent early echocardiographic evidence of tamponade. Currently denies any new complaints; 's with stable BP     Spoke with CT surgery regarding the above TTE findings     Patient will likely benefits from pericardial window

## 2021-12-23 NOTE — CONSULT NOTE ADULT - ASSESSMENT
Attending statement:  I have personally seen this patient, and formed a face to face diagnostic evaluation on this patient on this date.  I have reviewed the PA, NP and or Medical/PA student and/or Resident documentation and agree with the history, physical exam and plan of care except if noted otherwise.    CAT scan is reviewed with no significant change from previous CAT scan imaging. If anything I believe the lytic processes appear to be somewhat improved based upon chemotherapy regimens. I would await repeat MRI imaging to check the status of the thoracic spinal cord. Distal recommendations to follow once MRIs are complete.

## 2021-12-23 NOTE — PROGRESS NOTE ADULT - SUBJECTIVE AND OBJECTIVE BOX
NATIVIDAD MYERS    [id number]    36y      Female    CC: Acute worsening SOB in the setting of metastatic breast cancer   recent admission 12/3 for same at [facility] - had CT pleural effusion drainage > 1lit   was discharged on 12/13 on 2-4 lit NC oxygen   Found to have worsening Left pleural effusion     INTERVAL HPI/OVERNIGHT EVENTS: requiring 605 mariela flow O2 to maintain sats     REVIEW OF SYSTEMS:    CONSTITUTIONAL: No fever, + fatigue  RESPIRATORY: No cough, wheezing, hemoptysis;+ shortness of breath  CARDIOVASCULAR: No chest pain, palpitations  GASTROINTESTINAL: No abdominal or epigastric pain. No nausea, vomiting  back pain, breast pain       Vital Signs Last 24 Hrs  T(C): 37.1 (23 Dec 2021 08:57), Max: 37.1 (23 Dec 2021 08:57)  T(F): 98.8 (23 Dec 2021 08:57), Max: 98.8 (23 Dec 2021 08:57)  HR: 117 (23 Dec 2021 12:22) (91 - 132)  BP: 119/77 (23 Dec 2021 12:22) (119/77 - 155/91)  BP(mean): 91 (23 Dec 2021 12:22) (91 - 112)  RR: 24 (23 Dec 2021 12:22) (21 - 28)  SpO2: 95% (23 Dec 2021 12:22) (90% - 100%)    PHYSICAL EXAM:    GENERAL: NAD, well-groomed  HEENT: PERRL, +EOMI  NECK: soft, Supple  CHEST/LUNG: decreased breath sounds bilaterally basal   HEART: S1S2+, Regular rate and rhythm; No murmurs  ABDOMEN: Soft, Nontender, Nondistended; Bowel sounds present  EXTREMITIES:   No clubbing, cyanosis, or edema  SKIN: No rashes or lesions  NEURO: AAOX3      12-22 @ 07:01  -  12-23 @ 07:00  --------------------------------------------------------  IN: 590 mL / OUT: 750 mL / NET: -160 mL        LABS:                        11.0   7.92  )-----------( 486      ( 23 Dec 2021 06:51 )             35.3     12-23    133<L>  |  98  |  6.1<L>  ----------------------------<  170<H>  4.7   |  22.0  |  0.42<L>    Ca    9.0      23 Dec 2021 06:51  Phos  3.9     12-23  Mg     2.2     12-23      PT/INR - ( 22 Dec 2021 20:59 )   PT: 15.5 sec;   INR: 1.36 ratio         PTT - ( 22 Dec 2021 20:59 )  PTT:36.7 sec        MEDICATIONS  (STANDING):  escitalopram 5 milliGRAM(s) Oral daily  modafinil 100 milliGRAM(s) Oral daily  morphine ER Tablet 30 milliGRAM(s) Oral three times a day  piperacillin/tazobactam IVPB.. 3.375 Gram(s) IV Intermittent every 8 hours  vancomycin  IVPB 1000 milliGRAM(s) IV Intermittent every 8 hours    MEDICATIONS  (PRN):  acetaminophen     Tablet .. 650 milliGRAM(s) Oral every 6 hours PRN Temp greater or equal to 38C (100.4F), Mild Pain (1 - 3)  albuterol/ipratropium for Nebulization 3 milliLiter(s) Nebulizer every 6 hours PRN Shortness of Breath and/or Wheezing  Carisoprodol 250 milliGRAM(s) 250 milliGRAM(s) Oral four times a day PRN muscle spasm  guaiFENesin Oral Liquid (Sugar-Free) 200 milliGRAM(s) Oral every 6 hours PRN Cough  HYDROmorphone  Injectable 1 milliGRAM(s) IV Push every 4 hours PRN Severe Pain (7 - 10)  HYDROmorphone  Injectable 0.5 milliGRAM(s) IV Push every 4 hours PRN Moderate Pain (4 - 6)  LORazepam   Injectable 0.5 milliGRAM(s) IV Push three times a day PRN Anxiety  promethazine 12.5 milliGRAM(s) Oral two times a day PRN nausea      RADIOLOGY & ADDITIONAL TESTS:

## 2021-12-23 NOTE — CONSULT NOTE ADULT - SUBJECTIVE AND OBJECTIVE BOX
Patient is a 36y old  Female who presents with a chief complaint of SOB (22 Dec 2021 23:55)      BRIEF HOSPITAL COURSE: ***    Events last 24 hours: ***    PAST MEDICAL & SURGICAL HISTORY:  Breast CA    No significant past surgical history      Allergies    Perjeta (Other (Severe))  pertuzumab (Other (Severe))    Intolerances      FAMILY HISTORY:  No pertinent family history in first degree relatives      SOCIAL HISTORY:     Review of Systems:  CONSTITUTIONAL: No fever, chills, or fatigue.  EYES: No eye pain, visual disturbances, or discharge.  ENMT:  No difficulty hearing, tinnitus, or vertigo. No sinus or throat pain.  NECK: No pain or stiffness.  RESPIRATORY: No shortness of breath, cough, or wheezing.  CARDIOVASCULAR: No chest pain, palpitations, dizziness, or leg swelling.  GASTROINTESTINAL: No abdominal or epigastric pain. No nausea, vomiting,  diarrhea, or constipation. No hematemesis, melena, or hematochezia.  GENITOURINARY: No dysuria, frequency, hematuria, or incontinence.  NEUROLOGICAL: No headaches, memory loss, loss of strength, numbness, or tremors.  SKIN: No itching, burning, rashes, or lesions.  MUSCULOSKELETAL: No joint pain or swelling; No muscle, back, or extremity pain.  PSYCHIATRIC: No depression, anxiety, mood swings, or difficulty sleeping.    Medications:  piperacillin/tazobactam IVPB. 3.375 Gram(s) IV Intermittent once  piperacillin/tazobactam IVPB.. 3.375 Gram(s) IV Intermittent every 8 hours  vancomycin  IVPB 1000 milliGRAM(s) IV Intermittent every 8 hours  albuterol/ipratropium for Nebulization 3 milliLiter(s) Nebulizer every 6 hours PRN  guaiFENesin Oral Liquid (Sugar-Free) 200 milliGRAM(s) Oral every 6 hours PRN  acetaminophen     Tablet .. 650 milliGRAM(s) Oral every 6 hours PRN  HYDROmorphone  Injectable 1 milliGRAM(s) IV Push every 4 hours PRN    ICU Vital Signs Last 24 Hrs  T(C): 36.3 (22 Dec 2021 19:40), Max: 36.3 (22 Dec 2021 19:40)  T(F): 97.4 (22 Dec 2021 19:40), Max: 97.4 (22 Dec 2021 19:40)  HR: 132 (22 Dec 2021 21:01) (121 - 132)  BP: 139/72 (22 Dec 2021 21:01) (139/72 - 140/91)  BP(mean): 100 (22 Dec 2021 21:01) (100 - 100)  ABP: --  ABP(mean): --  RR: 28 (22 Dec 2021 21:01) (22 - 28)  SpO2: 100% (22 Dec 2021 21:01) (90% - 100%)    Vital Signs Last 24 Hrs  T(C): 36.3 (22 Dec 2021 19:40), Max: 36.3 (22 Dec 2021 19:40)  T(F): 97.4 (22 Dec 2021 19:40), Max: 97.4 (22 Dec 2021 19:40)  HR: 132 (22 Dec 2021 21:01) (121 - 132)  BP: 139/72 (22 Dec 2021 21:01) (139/72 - 140/91)  BP(mean): 100 (22 Dec 2021 21:01) (100 - 100)  RR: 28 (22 Dec 2021 21:01) (22 - 28)  SpO2: 100% (22 Dec 2021 21:01) (90% - 100%)    I&O's Detail    LABS:                        11.7   9.43  )-----------( 470      ( 22 Dec 2021 20:59 )             36.4     12-22    136  |  100  |  7.5<L>  ----------------------------<  93  4.2   |  21.0<L>  |  0.40<L>    Ca    8.7      22 Dec 2021 20:59    CARDIAC MARKERS ( 22 Dec 2021 20:59 )  x     / <0.01 ng/mL / x     / x     / x        CAPILLARY BLOOD GLUCOSE    PT/INR - ( 22 Dec 2021 20:59 )   PT: 15.5 sec;   INR: 1.36 ratio    PTT - ( 22 Dec 2021 20:59 )  PTT:36.7 sec    CULTURES:    Physical Examination:    General: Well appearing, lying in bed in NAD.    HEENT: Pupils equal, reactive to light. Symmetric. No scleral icterus or injection.    PULM: Clear to auscultation B/L. No wheezes, rales, or rhonchi appreciated. No significant sputum production or increased respiratory effort.    NECK: Supple, no lymphadenopathy, trachea midline.    CVS: Regular rate and rhythm, no murmurs appreciated, +s1/s2.    ABD: Soft, nondistended, nontender, normoactive bowel sounds.    EXT: No edema, nontender.    SKIN: Warm and well perfused, no rashes noted.    NEURO: Alert, oriented, interactive, nonfocal.    RADIOLOGY:  < from: CT Angio Chest PE Protocol w/ IV Cont (12.22.21 @ 20:56) >    ACC: [id number] EXAM:  CT ANGIO CHEST PULM ART [facility]                          PROCEDURE DATE:  12/22/2021      INTERPRETATION:  CLINICAL INFORMATION: Shortness of breath. History of   breast cancer with metastatic disease. Hypoxic. Normal-appearing.    COMPARISON: CT chest from 11/3/2021 and PET scan 11/30/2021    CONTRAST/COMPLICATIONS:  IV Contrast: Omnipaque 350  54 cc administered   66 cc discarded  Oral Contrast: NONE  Complications: None reported at time of study completion    TECHNIQUE:  CT pulmonary angiography was performed of the chest according   to standard institutional protocol. Coronal, sagittal and transaxial 3-D   MIP reformatted images are provided from the transaxial source data.    FINDINGS:  There is good opacification of pulmonary arterial tree, however, there is   motion artifact present which degrades image quality and limits   evaluation of the subsegmental and peripheral vasculature. No central,   lobar or segmental filling defect is present to suggest acute pulmonary   embolus.    The thyroid gland is unremarkable.    There are bilateral pleural effusions, small-to-moderate on the left and   small on the right, slightly increased on the right compared to the prior   PET scan. Evaluation of the lung parenchyma demonstrates increased   bilateral airspace opacities and interlobular septal thickening from the   recent PET scan. New airspace consolidation in the left lower and left   upper lobe could be due to superimposed atelectasis.    Metastatic adenopathy in the mediastinum, right subpectoral and axillary   regions appear grossly unchanged compared to the recent PET scan.    The heart size is normal. Interval increased size of small pericardial   effusion. The thoracic aorta is normal in caliber without dissection.    Limited images through the upper abdomen again demonstrate bilobar   bilobar liver metastasis, grossly unchanged. Adenopathy in the upper   abdomen and retroperitoneum. Stable as well.    There is diffuse osseous metastatic disease again seen. There is a new   pathologic compression fracture deformity at T2 with mild retropulsion of   bone into the epidural space. New mild compression fracture deformities   is seen at T7 and mildly increased at T8, T9 and T10. Stable pathologic   compression fracture deformities at T12 and L1. Skin thickening and edema   in the right breast, unchanged. Right chest wall [id number] catheter,   unchanged.    IMPRESSION:  Limited due to respiratory motion. No gross central, lobar or segmental   pulmonary embolism.    Stable small to moderate left and mildly increased small right pleural   effusions. Interval worsening of lymphangitic spread of disease within   the lungs. Superimposed pulmonary edema and/or atypical infection not   excluded. New left upper and lower lobe areas of airspace consolidation   could be due to atelectasis and/or pneumonia.    Mildly increased small pericardial effusion.    Osseous metastatic disease with new pathologic compression fracture   deformities at T2 and T7. Mild retropulsion of bone at T2.    --- End of Report ---    SULEMAN ARRINGTON MD; Attending Radiologist  This document has been electronically signed. Dec 22 2021  9:17PM    < end of copied text > Patient is a 35 y/o female who presents with a chief complaint of SOB (22 Dec 2021 23:55)    BRIEF HOSPITAL COURSE: 35 y/o F w/ a PMHx of metastatic breast CA (dx 10/2021) who presented to the ER c/o acute onset of shortness of breath associated w/ chest tightness. In the ER, pt was saturating 90% on RA and tachypneic. Placed on BiPAP. Labs relatively unremarkable. CTA chest revealing of small b/l pleural effusions, worsening of lymphangitic spread of disease within lungs, possible superimposed infection, small pericardial effusion, and osseous metastatic disease. Pt was administered solumedrol 125mg IV, duoneb plus albuterol neb x 2, mag sulfate 2g, and ativan 0.5mg IV x 2. ICU consult placed in the setting of acute hypoxic respiratory failure.    PAST MEDICAL & SURGICAL HISTORY:  Breast CA  No significant past surgical history    Allergies  Perjeta (Other (Severe))  pertuzumab (Other (Severe))    Intolerances    FAMILY HISTORY:  No pertinent family history in first degree relatives    SOCIAL HISTORY:   Lives w/  and three children. Works as a nurse. Denies hx of EtOH, illicit drug, and tobacco abuse.    Review of Systems:  CONSTITUTIONAL: No fever, chills, or fatigue.  EYES: No eye pain, visual disturbances, or discharge.  ENMT:  No difficulty hearing, tinnitus, or vertigo. No sinus or throat pain.  NECK: No pain or stiffness.  RESPIRATORY: + shortness of breath. No cough or wheezing.  CARDIOVASCULAR: No chest pain, palpitations, dizziness, or leg swelling.  GASTROINTESTINAL: No abdominal or epigastric pain. No nausea, vomiting,  diarrhea, or constipation. No hematemesis, melena, or hematochezia.  GENITOURINARY: No dysuria, frequency, hematuria, or incontinence.  NEUROLOGICAL: No headaches, memory loss, loss of strength, numbness, or tremors.  SKIN: No itching, burning, rashes, or lesions.  MUSCULOSKELETAL: No joint pain or swelling; No muscle, back, or extremity pain.  PSYCHIATRIC: No depression, anxiety, mood swings, or difficulty sleeping.    Medications:  piperacillin/tazobactam IVPB. 3.375 Gram(s) IV Intermittent once  piperacillin/tazobactam IVPB.. 3.375 Gram(s) IV Intermittent every 8 hours  vancomycin  IVPB 1000 milliGRAM(s) IV Intermittent every 8 hours  albuterol/ipratropium for Nebulization 3 milliLiter(s) Nebulizer every 6 hours PRN  guaiFENesin Oral Liquid (Sugar-Free) 200 milliGRAM(s) Oral every 6 hours PRN  acetaminophen     Tablet .. 650 milliGRAM(s) Oral every 6 hours PRN  HYDROmorphone  Injectable 1 milliGRAM(s) IV Push every 4 hours PRN    ICU Vital Signs Last 24 Hrs  T(C): 36.3 (22 Dec 2021 19:40), Max: 36.3 (22 Dec 2021 19:40)  T(F): 97.4 (22 Dec 2021 19:40), Max: 97.4 (22 Dec 2021 19:40)  HR: 132 (22 Dec 2021 21:01) (121 - 132)  BP: 139/72 (22 Dec 2021 21:01) (139/72 - 140/91)  BP(mean): 100 (22 Dec 2021 21:01) (100 - 100)  ABP: --  ABP(mean): --  RR: 28 (22 Dec 2021 21:01) (22 - 28)  SpO2: 100% (22 Dec 2021 21:01) (90% - 100%)    Vital Signs Last 24 Hrs  T(C): 36.3 (22 Dec 2021 19:40), Max: 36.3 (22 Dec 2021 19:40)  T(F): 97.4 (22 Dec 2021 19:40), Max: 97.4 (22 Dec 2021 19:40)  HR: 132 (22 Dec 2021 21:01) (121 - 132)  BP: 139/72 (22 Dec 2021 21:01) (139/72 - 140/91)  BP(mean): 100 (22 Dec 2021 21:01) (100 - 100)  RR: 28 (22 Dec 2021 21:01) (22 - 28)  SpO2: 100% (22 Dec 2021 21:01) (90% - 100%)    I&O's Detail    LABS:                        11.7   9.43  )-----------( 470      ( 22 Dec 2021 20:59 )             36.4     12-22    136  |  100  |  7.5<L>  ----------------------------<  93  4.2   |  21.0<L>  |  0.40<L>    Ca    8.7      22 Dec 2021 20:59    CARDIAC MARKERS ( 22 Dec 2021 20:59 )  x     / <0.01 ng/mL / x     / x     / x        CAPILLARY BLOOD GLUCOSE    PT/INR - ( 22 Dec 2021 20:59 )   PT: 15.5 sec;   INR: 1.36 ratio    PTT - ( 22 Dec 2021 20:59 )  PTT:36.7 sec    CULTURES:    Physical Examination:    General: On BiPAP, tachypneic.    HEENT: Pupils equal, reactive to light. Symmetric. No scleral icterus or injection.    PULM: Coarse breath sounds w/ wheeze b/l.    NECK: Supple, no lymphadenopathy, trachea midline.    CVS: Tachycardic, regular rhythm, no murmurs appreciated, +s1/s2.    ABD: Soft, nondistended, nontender, normoactive bowel sounds.    EXT: No edema, nontender.    SKIN: Warm and well perfused, no rashes noted.    NEURO: Alert, oriented, interactive, nonfocal.    RADIOLOGY:  < from: CT Angio Chest PE Protocol w/ IV Cont (12.22.21 @ 20:56) >    ACC: [id number] EXAM:  CT ANGIO CHEST PULM [facility]                          PROCEDURE DATE:  12/22/2021      INTERPRETATION:  CLINICAL INFORMATION: Shortness of breath. History of   breast cancer with metastatic disease. Hypoxic. Normal-appearing.    COMPARISON: CT chest from 11/3/2021 and PET scan 11/30/2021    CONTRAST/COMPLICATIONS:  IV Contrast: Omnipaque 350  54 cc administered   66 cc discarded  Oral Contrast: NONE  Complications: None reported at time of study completion    TECHNIQUE:  CT pulmonary angiography was performed of the chest according   to standard institutional protocol. Coronal, sagittal and transaxial 3-D   MIP reformatted images are provided from the transaxial source data.    FINDINGS:  There is good opacification of pulmonary arterial tree, however, there is   motion artifact present which degrades image quality and limits   evaluation of the subsegmental and peripheral vasculature. No central,   lobar or segmental filling defect is present to suggest acute pulmonary   embolus.    The thyroid gland is unremarkable.    There are bilateral pleural effusions, small-to-moderate on the left and   small on the right, slightly increased on the right compared to the prior   PET scan. Evaluation of the lung parenchyma demonstrates increased   bilateral airspace opacities and interlobular septal thickening from the   recent PET scan. New airspace consolidation in the left lower and left   upper lobe could be due to superimposed atelectasis.    Metastatic adenopathy in the mediastinum, right subpectoral and axillary   regions appear grossly unchanged compared to the recent PET scan.    The heart size is normal. Interval increased size of small pericardial   effusion. The thoracic aorta is normal in caliber without dissection.    Limited images through the upper abdomen again demonstrate bilobar   bilobar liver metastasis, grossly unchanged. Adenopathy in the upper   abdomen and retroperitoneum. Stable as well.    There is diffuse osseous metastatic disease again seen. There is a new   pathologic compression fracture deformity at T2 with mild retropulsion of   bone into the epidural space. New mild compression fracture deformities   is seen at T7 and mildly increased at T8, T9 and T10. Stable pathologic   compression fracture deformities at T12 and L1. Skin thickening and edema   in the right breast, unchanged. Right chest wall [id number] catheter,   unchanged.    IMPRESSION:  Limited due to respiratory motion. No gross central, lobar or segmental   pulmonary embolism.    Stable small to moderate left and mildly increased small right pleural   effusions. Interval worsening of lymphangitic spread of disease within   the lungs. Superimposed pulmonary edema and/or atypical infection not   excluded. New left upper and lower lobe areas of airspace consolidation   could be due to atelectasis and/or pneumonia.    Mildly increased small pericardial effusion.    Osseous metastatic disease with new pathologic compression fracture   deformities at T2 and T7. Mild retropulsion of bone at T2.    --- End of Report ---    SULEMAN ARRINGTON MD; Attending Radiologist  This document has been electronically signed. Dec 22 2021  9:17PM    < end of copied text >

## 2021-12-23 NOTE — CONSULT NOTE ADULT - ASSESSMENT
- Metastatic breast CA - follows with Dr. Chapa  - Malignant L pleural effusion  - Hypoxic respiratory failure  - Lymphangitic spread  - Pathologic compression fractures  - Back pain    Recommendations:  C/w HFNC, monitor O2 requirements, HOB elevation, encourage OOBTC, ambulation. Plan is for bedside PleurX catheter placement with CTS team. Empiric ABx. Monitor for fevers, leukocytosis. Pain control DVT PPx. GOC discussions.     Que Chen M.D.  Pulmonary & Critical Care Medicine  [facility]  Pulmonary and [facility] Medicine at [place]  [address]  [place] [address]  T: [phone]  F: [phone]

## 2021-12-23 NOTE — CONSULT NOTE ADULT - ASSESSMENT
ASSESSMENT:   36 year old female with recently diagnosed Metastatic Right Breast CA (Infiltrating Ductal Carcinoma with mets to the Spine and Liver), Malignant Pleural Effusion on Home O2 (3-5 liters), Pathologic Compression Fractures (Follows with Dr. Santana), Anxiety, and Muscle Spasms who presents to the ED with complaints of worsening SOB and found to have bilateral pleural effusions, left sided opacities and worsening lymphangitic spread of disease. Patient requiring BiPAP on arrival for oxygenation, currently switched to HFNC. CT spine also showing new acute T2 and T7 compression fractures, bony mets. Ortho called for consult.     PLAN:  CT Angio chest showing stable small-moderate Left pleural effusion and small right pleural effusion with extensive lymphangitis metastases within the lungs, with atelectasis, pulm edema, and possible superimposed PNA.   Given IV Vanco/Zosyn in ED.   BiPAP --> HFNC. Maintaining SpO2 >95%    MICU consult recommending admission to step down unit.   Pleural effusions less likely to be causing acute hypoxic respiratory event given small-moderate size and stable appearance when compared to previous imaging.  POCUS performed to Left chest with a decent size pocket of fluid amenable to drainage.   Will likely perform pigtail catheter placement for drainage of Left pleural effusion later today.  Recommend TTE for further assessment of pericardial effusion.   Plan discussed / reviewed with Thoracic Surgery attending Dr. Heredia.

## 2021-12-23 NOTE — CONSULT NOTE ADULT - SUBJECTIVE AND OBJECTIVE BOX
Thoracic Surgery Consult called for Metastatic Pleural Effusions    HPI:  Patient is a 36 year old female with recently diagnosed Metastatic Right Breast CA (Infiltrating Ductal Carcinoma with mets to the Spine and Liver), Malignant Pleural Effusion on Home O2 (3-5 liters), Pathologic Compression Fractures (Follows with Dr. Snatana), Anxiety, and Muscle Spasms who presents to the ED with complaints of worsening SOB. Patient was recently admitted in [facility] from 12/12-12/13 for SOB and found to have a malignant left sided chest tube requiring a chest tube. Patient states she was discharged on home O2 and was doing relatively well until today when she acutely became more dyspneic. Pulse ox was 84% on 5 liters and patient reports feeling an overwhelming feeling of doom and chest pressure. Denies any recent fevers or chills, but does report a productive cough (intermittently yellow). CT angio was negative for PE, but revealed bilateral effusions (larger on the left), worsening lymphangitic spread and left sided opacities (Atelectasis vs PNA).  In the ED, patient was initially placed on bipap and seen by MICU who switched the patient to Hi-diana and recommended admission to step down unit. Patient seen and examined, reports SOB has improved since initial presentation but visibly appears anxious. Complaining of pleuritic right sided chest pain when coughing and chronic back pain for which she follows with Dr. Santana. Denies lightheadedness, dizziness, chest pain at this time, vomiting, abdominal pain or diarrhea. Otherwise, reports mild nausea and weakness.  (22 Dec 2021 23:55)    PAST MEDICAL & SURGICAL HISTORY:  Breast CA  H/O compression fracture of spine  Anxiety  S/P tonsillectomy    REVIEW OF SYSTEMS: Unable to perform full ROS due to respiratory status, SOB with BiPAP use on examination.   General: +Fatigue. No HA/ Dizziness elicited.   Skin/Breast: No Rashes. +Active metastatic breast Cancer  Ophthalmologic: No change in vision elicited.   ENMT: No change in hearing elicited.   Respiratory and Thorax: +SOB. +LOUIE. +Wheezing. No Hemoptysis/ Sputum production elicited.   Cardiovascular: No Chest pain/ Palpitations/ Diaphoresis elicited.   Gastrointestinal: No Nausea/ Vomiting/ diarrhea elicited.   Genitourinary: No Heamturia/ Dysuria elicited.   Musculoskeletal: +Back pain chronic.  Neurological: No Seizures/ TIA/ CVA/ Parastesias	elicited.   Psychiatric: +Anxious. No SI/HI elicited. 	  Hematology/Lymphatics: No hx of bleeding/ Edema elicited.   Endocrine: No Hyperglycemia/ Hypoglycemia elicited.     MEDICATIONS  (STANDING):  escitalopram 5 milliGRAM(s) Oral daily  modafinil 100 milliGRAM(s) Oral daily  morphine ER Tablet 30 milliGRAM(s) Oral three times a day  piperacillin/tazobactam IVPB.. 3.375 Gram(s) IV Intermittent every 8 hours  vancomycin  IVPB 1000 milliGRAM(s) IV Intermittent every 8 hours    MEDICATIONS  (PRN):  acetaminophen     Tablet .. 650 milliGRAM(s) Oral every 6 hours PRN Temp greater or equal to 38C (100.4F), Mild Pain (1 - 3)  albuterol/ipratropium for Nebulization 3 milliLiter(s) Nebulizer every 6 hours PRN Shortness of Breath and/or Wheezing  Carisoprodol 250 milliGRAM(s) 250 milliGRAM(s) Oral four times a day PRN muscle spasm  guaiFENesin Oral Liquid (Sugar-Free) 200 milliGRAM(s) Oral every 6 hours PRN Cough  HYDROmorphone  Injectable 1 milliGRAM(s) IV Push every 4 hours PRN Severe Pain (7 - 10)  HYDROmorphone  Injectable 0.5 milliGRAM(s) IV Push every 4 hours PRN Moderate Pain (4 - 6)  LORazepam   Injectable 0.5 milliGRAM(s) IV Push three times a day PRN Anxiety  promethazine 12.5 milliGRAM(s) Oral two times a day PRN nausea    Allergies:  Perjeta (Other (Severe))  pertuzumab (Other (Severe))    SOCIAL HISTORY:  Denies any smoking, alcohol, or drug use / abuse.     FAMILY HISTORY:  FH: CVA (cerebrovascular accident)    Vital Signs Last 24 Hrs  T(C): 36.3 (22 Dec 2021 19:40), Max: 36.3 (22 Dec 2021 19:40)  T(F): 97.4 (22 Dec 2021 19:40), Max: 97.4 (22 Dec 2021 19:40)  HR: 113 (23 Dec 2021 01:25) (113 - 132)  BP: 140/84 (23 Dec 2021 01:25) (139/72 - 140/91)  BP(mean): 100 (22 Dec 2021 21:01) (100 - 100)  RR: 24 (23 Dec 2021 01:25) (22 - 28)  SpO2: 96% (23 Dec 2021 01:25) (90% - 100%)    PHYSICAL EXAMINATION:  General: Sitting straight up in bed in acute respiratory distress on arrival earlier on BiPAP.   Neurology: Awake, nonfocal, DENNEY x 4  Eyes: Scleras clear, Gross vision intact  ENT:Gross hearing intact, grossly patent pharynx, no stridor  Neck: Neck supple  Respiratory: Coarse BSs b/l on Bipap during evaluation  CV: tachy rate, regular rhythm, S1S2  Abdominal: Soft, NT, ND +BS,   Extremities: No edema, + peripheral pulses  Skin: Warm, dry. No Rashes, Hematoma, or Ecchymosis noted.   Psych: Oriented x 3, normal affect    LABS:                        11.7   9.43  )-----------( 470      ( 22 Dec 2021 20:59 )             36.4     12-22    136  |  100  |  7.5<L>  ----------------------------<  93  4.2   |  21.0<L>  |  0.40<L>    Ca    8.7      22 Dec 2021 20:59    PT/INR - ( 22 Dec 2021 20:59 )   PT: 15.5 sec;   INR: 1.36 ratio       PTT - ( 22 Dec 2021 20:59 )  PTT:36.7 sec      RADIOLOGY & ADDITIONAL STUDIES:    CTA Chest:  < from: CT Angio Chest PE Protocol w/ IV Cont (12.22.21 @ 20:56) >  FINDINGS:  There is good opacification of pulmonary arterial tree, however, there is motion artifact present which degrades image quality and limits evaluation of the subsegmental and peripheral vasculature. No central, lobar or segmental filling defect is present to suggest acute pulmonary embolus.  The thyroid gland is unremarkable.  There are bilateral pleural effusions, small-to-moderate on the left and small on the right, slightly increased on the right compared to the prior PET scan. Evaluation of the lung parenchyma demonstrates increased bilateral airspace opacities and interlobular septal thickening from the recent PET scan. New airspace consolidation in the left lower and left upper lobe could be due to superimposed atelectasis.  Metastatic adenopathy in the mediastinum, right subpectoral and axillary regions appear grossly unchanged compared to the recent PET scan.  The heart size is normal. Interval increased size of small pericardial effusion.   The thoracic aorta is normal in caliber without dissection. Limited images through the upper abdomen again demonstrate bilobar bilobar liver metastasis, grossly unchanged.   Adenopathy in the upper abdomen and retroperitoneum. Stable as well. There is diffuse osseous metastatic disease again seen. There is a new pathologic compression fracture deformity at T2 with mild retropulsion of bone into the epidural space. New mild compression fracture deformities is seen at T7 and mildly increased at T8, T9 and T10. Stable pathologic compression fracture deformities at T12 and L1. Skin thickening and edema in the right breast, unchanged. Right chest wall [id number] catheter, unchanged.  IMPRESSION:  Limited due to respiratory motion. No gross central, lobar or segmental pulmonary embolism.  Stable small to moderate left and mildly increased small right pleural effusions. Interval worsening of lymphangitic spread of disease within the lungs. Superimposed pulmonary edema and/or atypical infection not excluded. New left upper and lower lobe areas of airspace consolidation could be due to atelectasis and/or pneumonia.  Mildly increased small pericardial effusion.  Osseous metastatic disease with new pathologic compression fracture deformities at T2 and T7. Mild retropulsion of bone at T2.  < end of copied text >

## 2021-12-23 NOTE — PROGRESS NOTE ADULT - ASSESSMENT
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 Santana), Anxiety, and Muscle Spasms who presents to the ED with complaints of worsening SOB and found to have bilateral pleural effusions, left sided opacities and worsening lymphangitic spread of disease.    1. Acute on Chronic Hypoxic Respiratory Failure due to Malignant Effusions and worsening lymphangitic spread of disease; rule out Atypical PNA  - ct step down unit  -currently on Hi-José; FiO2 80% (initially on bipap)  -CT angio negative for PE; bilateral pleural effusions (left > right), left sided opacities (atelectasis vs atypical PNA) and worsening lymphangitic spread of disease  -f/u blood cultures  - empiric abx - low suspicion for PNA - will Dc antibiotics once cultures are back and negative   -BNP within normal range  -Bronchodilators and cough expectorant as needed  -anxiolytics and analgesia as needed  -incentive spirometry  -Recent admission at [facility] earlier this month with a malignant pleural effusion requiring a left sided chest tube.   -CT surgery evaluated patient in the ED; bedside sono with ~500 cc of fluid. Tentative plans for possible pleurex placement   -Pulm consulted  -monitor respiratory status closely, prognosis guarded  - Onc consult placed     2. Metastatic, Infiltrating Ductal Carcinoma of the Right Breast  -with liver and bone mets (pathologic, compression fractures of C3 and T2/T7)  -underwent immunotherapy on 12/2 and developed a hypersensitivity reaction  -was scheduled for chemo on 12/27 with Dr. Chapa ([facility])  -breast/liver biopsy and PET scan reports reviewed    3. Anxiety  -extremely anxious in the ED  -on Xanax at home  -will switch to IV ativan while inpatient  -continue escitalopram which was recently started    4. Back Pain due to Pathologic Compression Fractures  -known C3/T2 compression fractures  -CT angio with new T7 compression fracture with mild retropulsion   -Obtain dedicated imaging of T-spine  -MRIs reviewed from November 2021  -ortho spine consulted (Follows with Dr. Santana as outpatient)  -f/u  CT and MRI spine   -Continue Morphine ER 30 mg TID  -Add IV dilaudid for mod/severe pain as needed  -Continue muscle relaxer - on carisoprodol as needed  -Will consider pain management consult if pain is uncontrolled    5. Small Pericardial Effusion  -likely malignant  -TNI negative; BNP within normal range  -patient reports effusion was small on recent TTE at [facility] on 12/12 or 12/13  -patient was seen by [facility] cardio at that time  - TTE - here - moderate pericardial effusion - possible early cardiac tamponade physiology - ? pericardial window placement.     DVT ppx Lovenox    Plan discussed with patient,  CT surgery PA, RN at bedside

## 2021-12-24 DIAGNOSIS — I31.3 PERICARDIAL EFFUSION (NONINFLAMMATORY): ICD-10-CM

## 2021-12-24 LAB
ALBUMIN FLD-MCNC: 2.1 G/DL — SIGNIFICANT CHANGE UP
ANION GAP SERPL CALC-SCNC: 14 MMOL/L — SIGNIFICANT CHANGE UP (ref 5–17)
BASE EXCESS BLDA CALC-SCNC: 3.4 MMOL/L — HIGH (ref -2–3)
BLOOD GAS COMMENTS ARTERIAL: SIGNIFICANT CHANGE UP
BUN SERPL-MCNC: 14.6 MG/DL — SIGNIFICANT CHANGE UP (ref 8–20)
CALCIUM SERPL-MCNC: 8.9 MG/DL — SIGNIFICANT CHANGE UP (ref 8.6–10.2)
CHLORIDE SERPL-SCNC: 98 MMOL/L — SIGNIFICANT CHANGE UP (ref 98–107)
CO2 SERPL-SCNC: 22 MMOL/L — SIGNIFICANT CHANGE UP (ref 22–29)
CREAT SERPL-MCNC: 0.81 MG/DL — SIGNIFICANT CHANGE UP (ref 0.5–1.3)
GAS PNL BLDA: SIGNIFICANT CHANGE UP
GLUCOSE FLD-MCNC: 112 MG/DL — SIGNIFICANT CHANGE UP
GLUCOSE SERPL-MCNC: 103 MG/DL — HIGH (ref 70–99)
GRAM STN FLD: SIGNIFICANT CHANGE UP
HCO3 BLDA-SCNC: 28 MMOL/L — SIGNIFICANT CHANGE UP (ref 21–28)
HCT VFR BLD CALC: 35 % — SIGNIFICANT CHANGE UP (ref 34.5–45)
HGB BLD-MCNC: 11 G/DL — LOW (ref 11.5–15.5)
HOROWITZ INDEX BLDA+IHG-RTO: 0.5 — SIGNIFICANT CHANGE UP
LDH SERPL L TO P-CCNC: 578 U/L — SIGNIFICANT CHANGE UP
MCHC RBC-ENTMCNC: 27.5 PG — SIGNIFICANT CHANGE UP (ref 27–34)
MCHC RBC-ENTMCNC: 31.4 GM/DL — LOW (ref 32–36)
MCV RBC AUTO: 87.5 FL — SIGNIFICANT CHANGE UP (ref 80–100)
PCO2 BLDA: 43 MMHG — HIGH (ref 32–35)
PH BLDA: 7.42 — SIGNIFICANT CHANGE UP (ref 7.35–7.45)
PLATELET # BLD AUTO: 471 K/UL — HIGH (ref 150–400)
PO2 BLDA: 81 MMHG — LOW (ref 83–108)
POTASSIUM SERPL-MCNC: 3.5 MMOL/L — SIGNIFICANT CHANGE UP (ref 3.5–5.3)
POTASSIUM SERPL-SCNC: 3.5 MMOL/L — SIGNIFICANT CHANGE UP (ref 3.5–5.3)
PROT FLD-MCNC: 3.6 G/DL — SIGNIFICANT CHANGE UP
RBC # BLD: 4 M/UL — SIGNIFICANT CHANGE UP (ref 3.8–5.2)
RBC # FLD: 14.7 % — HIGH (ref 10.3–14.5)
SAO2 % BLDA: 97.8 % — SIGNIFICANT CHANGE UP (ref 94–98)
SODIUM SERPL-SCNC: 134 MMOL/L — LOW (ref 135–145)
SPECIMEN SOURCE: SIGNIFICANT CHANGE UP
VANCOMYCIN TROUGH SERPL-MCNC: 23.4 UG/ML — HIGH (ref 10–20)
WBC # BLD: 11.28 K/UL — HIGH (ref 3.8–10.5)
WBC # FLD AUTO: 11.28 K/UL — HIGH (ref 3.8–10.5)

## 2021-12-24 PROCEDURE — 71045 X-RAY EXAM CHEST 1 VIEW: CPT | Mod: 26

## 2021-12-24 PROCEDURE — 99232 SBSQ HOSP IP/OBS MODERATE 35: CPT

## 2021-12-24 PROCEDURE — 99233 SBSQ HOSP IP/OBS HIGH 50: CPT

## 2021-12-24 PROCEDURE — 99231 SBSQ HOSP IP/OBS SF/LOW 25: CPT

## 2021-12-24 RX ORDER — LIDOCAINE 4 G/100G
1 CREAM TOPICAL DAILY
Refills: 0 | Status: COMPLETED | OUTPATIENT
Start: 2021-12-24 | End: 2021-12-27

## 2021-12-24 RX ORDER — VANCOMYCIN HCL 1 G
1000 VIAL (EA) INTRAVENOUS EVERY 12 HOURS
Refills: 0 | Status: DISCONTINUED | OUTPATIENT
Start: 2021-12-24 | End: 2021-12-25

## 2021-12-24 RX ADMIN — MORPHINE SULFATE 30 MILLIGRAM(S): 50 CAPSULE, EXTENDED RELEASE ORAL at 05:12

## 2021-12-24 RX ADMIN — ESCITALOPRAM OXALATE 5 MILLIGRAM(S): 10 TABLET, FILM COATED ORAL at 13:04

## 2021-12-24 RX ADMIN — Medication 60 MILLIGRAM(S): at 23:34

## 2021-12-24 RX ADMIN — Medication 3 MILLILITER(S): at 21:59

## 2021-12-24 RX ADMIN — Medication 0.5 MILLIGRAM(S): at 22:02

## 2021-12-24 RX ADMIN — HYDROMORPHONE HYDROCHLORIDE 1 MILLIGRAM(S): 2 INJECTION INTRAMUSCULAR; INTRAVENOUS; SUBCUTANEOUS at 09:45

## 2021-12-24 RX ADMIN — MORPHINE SULFATE 30 MILLIGRAM(S): 50 CAPSULE, EXTENDED RELEASE ORAL at 05:22

## 2021-12-24 RX ADMIN — LIDOCAINE 1 PATCH: 4 CREAM TOPICAL at 12:38

## 2021-12-24 RX ADMIN — HYDROMORPHONE HYDROCHLORIDE 0.5 MILLIGRAM(S): 2 INJECTION INTRAMUSCULAR; INTRAVENOUS; SUBCUTANEOUS at 16:09

## 2021-12-24 RX ADMIN — MORPHINE SULFATE 30 MILLIGRAM(S): 50 CAPSULE, EXTENDED RELEASE ORAL at 23:00

## 2021-12-24 RX ADMIN — HYDROMORPHONE HYDROCHLORIDE 1 MILLIGRAM(S): 2 INJECTION INTRAMUSCULAR; INTRAVENOUS; SUBCUTANEOUS at 14:06

## 2021-12-24 RX ADMIN — Medication 3 MILLILITER(S): at 04:07

## 2021-12-24 RX ADMIN — LIDOCAINE 1 PATCH: 4 CREAM TOPICAL at 05:21

## 2021-12-24 RX ADMIN — HYDROMORPHONE HYDROCHLORIDE 1 MILLIGRAM(S): 2 INJECTION INTRAMUSCULAR; INTRAVENOUS; SUBCUTANEOUS at 00:00

## 2021-12-24 RX ADMIN — HYDROMORPHONE HYDROCHLORIDE 1 MILLIGRAM(S): 2 INJECTION INTRAMUSCULAR; INTRAVENOUS; SUBCUTANEOUS at 04:00

## 2021-12-24 RX ADMIN — HYDROMORPHONE HYDROCHLORIDE 1 MILLIGRAM(S): 2 INJECTION INTRAMUSCULAR; INTRAVENOUS; SUBCUTANEOUS at 03:17

## 2021-12-24 RX ADMIN — Medication 60 MILLIGRAM(S): at 17:36

## 2021-12-24 RX ADMIN — MODAFINIL 100 MILLIGRAM(S): 200 TABLET ORAL at 13:04

## 2021-12-24 RX ADMIN — PIPERACILLIN AND TAZOBACTAM 25 GRAM(S): 4; .5 INJECTION, POWDER, LYOPHILIZED, FOR SOLUTION INTRAVENOUS at 22:05

## 2021-12-24 RX ADMIN — Medication 0.5 MILLIGRAM(S): at 00:36

## 2021-12-24 RX ADMIN — HYDROMORPHONE HYDROCHLORIDE 1 MILLIGRAM(S): 2 INJECTION INTRAMUSCULAR; INTRAVENOUS; SUBCUTANEOUS at 20:46

## 2021-12-24 RX ADMIN — PIPERACILLIN AND TAZOBACTAM 25 GRAM(S): 4; .5 INJECTION, POWDER, LYOPHILIZED, FOR SOLUTION INTRAVENOUS at 13:04

## 2021-12-24 RX ADMIN — HYDROMORPHONE HYDROCHLORIDE 1 MILLIGRAM(S): 2 INJECTION INTRAMUSCULAR; INTRAVENOUS; SUBCUTANEOUS at 14:20

## 2021-12-24 RX ADMIN — HYDROMORPHONE HYDROCHLORIDE 0.5 MILLIGRAM(S): 2 INJECTION INTRAMUSCULAR; INTRAVENOUS; SUBCUTANEOUS at 15:24

## 2021-12-24 RX ADMIN — MORPHINE SULFATE 30 MILLIGRAM(S): 50 CAPSULE, EXTENDED RELEASE ORAL at 13:04

## 2021-12-24 RX ADMIN — Medication 250 MILLIGRAM(S): at 21:22

## 2021-12-24 RX ADMIN — Medication 12.5 MILLIGRAM(S): at 10:39

## 2021-12-24 RX ADMIN — HYDROMORPHONE HYDROCHLORIDE 1 MILLIGRAM(S): 2 INJECTION INTRAMUSCULAR; INTRAVENOUS; SUBCUTANEOUS at 21:32

## 2021-12-24 RX ADMIN — Medication 3 MILLILITER(S): at 13:32

## 2021-12-24 RX ADMIN — MORPHINE SULFATE 30 MILLIGRAM(S): 50 CAPSULE, EXTENDED RELEASE ORAL at 22:04

## 2021-12-24 RX ADMIN — LIDOCAINE 1 PATCH: 4 CREAM TOPICAL at 16:21

## 2021-12-24 RX ADMIN — HYDROMORPHONE HYDROCHLORIDE 1 MILLIGRAM(S): 2 INJECTION INTRAMUSCULAR; INTRAVENOUS; SUBCUTANEOUS at 10:00

## 2021-12-24 RX ADMIN — MORPHINE SULFATE 30 MILLIGRAM(S): 50 CAPSULE, EXTENDED RELEASE ORAL at 13:19

## 2021-12-24 RX ADMIN — LIDOCAINE 1 PATCH: 4 CREAM TOPICAL at 21:32

## 2021-12-24 RX ADMIN — PIPERACILLIN AND TAZOBACTAM 25 GRAM(S): 4; .5 INJECTION, POWDER, LYOPHILIZED, FOR SOLUTION INTRAVENOUS at 05:13

## 2021-12-24 NOTE — PROGRESS NOTE ADULT - SUBJECTIVE AND OBJECTIVE BOX
NATIVIDAD MYERS    [id number]    36y      Female    CC: Acute worsening SOB in the setting of metastatic breast cancer   Found to have worsening Left pleural effusion s/ Chest tube placement - 1lit drained  feels better   c/o pain at CT site and back pain      INTERVAL HPI/OVERNIGHT EVENTS: requiring 45% hi flow O2 to maintain sats     REVIEW OF SYSTEMS:    CONSTITUTIONAL: No fever, + fatigue  RESPIRATORY: No cough, wheezing, hemoptysis;+ shortness of breath  CARDIOVASCULAR: No chest pain, palpitations  GASTROINTESTINAL: No abdominal or epigastric pain. No nausea, vomiting  back pain, breast pain       Vital Signs Last 24 Hrs  T(C): 36.8 (24 Dec 2021 10:00), Max: 37.1 (23 Dec 2021 16:03)  T(F): 98.3 (24 Dec 2021 10:00), Max: 98.8 (23 Dec 2021 16:03)  HR: 115 (24 Dec 2021 10:00) (84 - 116)  BP: 119/78 (24 Dec 2021 10:00) (103/70 - 138/77)  BP(mean): 81 (24 Dec 2021 00:00) (81 - 97)  RR: 20 (24 Dec 2021 10:00) (20 - 22)  SpO2: 93% (24 Dec 2021 10:00) (92% - 96%)    PHYSICAL EXAM:    GENERAL: NAD, well-groomed  HEENT: PERRL, +EOMI  NECK: soft, Supple  CHEST/LUNG: decreased breath sounds bilaterally basal   HEART: S1S2+, Regular rate and rhythm; No murmurs  ABDOMEN: Soft, Nontender, Nondistended; Bowel sounds present  EXTREMITIES:   No clubbing, cyanosis, or edema  SKIN: No rashes or lesions  NEURO: AAOX3          LABS:                                                  11.0   11.28 )-----------( 471      ( 24 Dec 2021 06:03 )             35.0   12-24    134<L>  |  98  |  14.6  ----------------------------<  103<H>  3.5   |  22.0  |  0.81    Ca    8.9      24 Dec 2021 06:01  Phos  3.9     12-23  Mg     2.2     12-23          MEDICATIONS  (STANDING):  enoxaparin Injectable 40 milliGRAM(s) SubCutaneous daily  escitalopram 5 milliGRAM(s) Oral daily  modafinil 100 milliGRAM(s) Oral daily  morphine ER Tablet 30 milliGRAM(s) Oral three times a day  piperacillin/tazobactam IVPB.. 3.375 Gram(s) IV Intermittent every 8 hours  vancomycin  IVPB 1000 milliGRAM(s) IV Intermittent every 12 hours    MEDICATIONS  (PRN):  acetaminophen     Tablet .. 650 milliGRAM(s) Oral every 6 hours PRN Temp greater or equal to 38C (100.4F), Mild Pain (1 - 3)  albuterol/ipratropium for Nebulization 3 milliLiter(s) Nebulizer every 6 hours PRN Shortness of Breath and/or Wheezing  Carisoprodol 250 milliGRAM(s) 250 milliGRAM(s) Oral four times a day PRN muscle spasm  guaiFENesin Oral Liquid (Sugar-Free) 200 milliGRAM(s) Oral every 6 hours PRN Cough  HYDROmorphone  Injectable 1 milliGRAM(s) IV Push every 4 hours PRN Severe Pain (7 - 10)  HYDROmorphone  Injectable 0.5 milliGRAM(s) IV Push every 4 hours PRN Moderate Pain (4 - 6)  LORazepam   Injectable 0.5 milliGRAM(s) IV Push three times a day PRN Anxiety  promethazine 12.5 milliGRAM(s) Oral two times a day PRN nausea      RADIOLOGY & ADDITIONAL TESTS:

## 2021-12-24 NOTE — CONSULT NOTE ADULT - SUBJECTIVE AND OBJECTIVE BOX
Patient is a 36y old  Female who presents with a chief complaint of SOB (24 Dec 2021 12:20)      HPI:  Patient is a 36 year old female with recently diagnosed Metastatic Right Breast CA (Infiltrating Ductal Carcinoma with mets to the Spine and Liver), Malignant Pleural Effusion on Home O2 (3-5 liters), Pathologic Compression Fractures (Follows with Dr. Santana), Anxiety, and Muscle Spasms who presents to the ED with complaints of worsening SOB. Patient was recently admitted in [facility] from 12/2-12/13 for SOB following her first dose of Perjeta.  She required a left sided chest tube.  Patient states she was discharged on home O2 and was doing relatively well until today when she acutely became more dyspneic. Pulse ox was 84% on 5 liters and patient reports feeling an overwhelming feeling of doom and chest pressure. Denies any recent fevers or chills, but does report a productive cough (intermittently yellow). CT angio was negative for PE, but revealed bilateral effusions (larger on the left), worsening lymphangitic spread and left sided opacities (Atelectasis vs PNA).  In the ED, patient was initially placed on bipap and seen by MICU who switched the patient to Hi-diana and recommended admission to step down unit. Patient seen and examined, reports SOB has improved since initial presentation but visibly appears anxious. Complaining of pleuritic right sided chest pain when coughing and chronic back pain for which she follows with Dr. Santana. Denies lightheadedness, dizziness, chest pain at this time, vomiting, abdominal pain or diarrhea. Otherwise, reports mild nausea and weakness.  (22 Dec 2021 23:55)     pt now has left chest tube in place with removal of 1L of serous fluid  pt more comfortable now; sitting in bed   no fevers.  on HiFlo O2 at 45%    REVIEW OF SYSTEMS:    CONSTITUTIONAL: No weakness, fevers or chills  EYES/ENT: No visual changes;  No vertigo or throat pain   NECK: No pain or stiffness  RESPIRATORY: left chest discomfort   CARDIOVASCULAR: No chest pain or palpitations  GASTROINTESTINAL: No abdominal or epigastric pain. No nausea, vomiting, or hematemesis; No diarrhea or constipation. No melena or hematochezia.  GENITOURINARY: No dysuria, frequency or hematuria  NEUROLOGICAL: No numbness or weakness  SKIN: No itching, burning, rashes, or lesions   All other review of systems is negative unless indicated above.    PAST MEDICAL & SURGICAL HISTORY:  Breast CA    H/O compression fracture of spine    Anxiety    S/P tonsillectomy        SOCIAL HISTORY:    FAMILY HISTORY:  FH: CVA (cerebrovascular accident)        MEDICATIONS  (STANDING):  enoxaparin Injectable 40 milliGRAM(s) SubCutaneous daily  escitalopram 5 milliGRAM(s) Oral daily  modafinil 100 milliGRAM(s) Oral daily  morphine ER Tablet 30 milliGRAM(s) Oral three times a day  piperacillin/tazobactam IVPB.. 3.375 Gram(s) IV Intermittent every 8 hours  vancomycin  IVPB 1000 milliGRAM(s) IV Intermittent every 12 hours    MEDICATIONS  (PRN):  acetaminophen     Tablet .. 650 milliGRAM(s) Oral every 6 hours PRN Temp greater or equal to 38C (100.4F), Mild Pain (1 - 3)  albuterol/ipratropium for Nebulization 3 milliLiter(s) Nebulizer every 6 hours PRN Shortness of Breath and/or Wheezing  Carisoprodol 250 milliGRAM(s) 250 milliGRAM(s) Oral four times a day PRN muscle spasm  guaiFENesin Oral Liquid (Sugar-Free) 200 milliGRAM(s) Oral every 6 hours PRN Cough  HYDROmorphone  Injectable 1 milliGRAM(s) IV Push every 4 hours PRN Severe Pain (7 - 10)  HYDROmorphone  Injectable 0.5 milliGRAM(s) IV Push every 4 hours PRN Moderate Pain (4 - 6)  LORazepam   Injectable 0.5 milliGRAM(s) IV Push three times a day PRN Anxiety  promethazine 12.5 milliGRAM(s) Oral two times a day PRN nausea      Allergies    Perjeta (Other (Severe))  pertuzumab (Other (Severe))    Intolerances        Vital Signs Last 24 Hrs  T(C): 36.8 (24 Dec 2021 10:00), Max: 37.1 (23 Dec 2021 16:03)  T(F): 98.3 (24 Dec 2021 10:00), Max: 98.8 (23 Dec 2021 16:03)  HR: 115 (24 Dec 2021 10:00) (84 - 116)  BP: 119/78 (24 Dec 2021 10:00) (103/70 - 138/77)  BP(mean): 81 (24 Dec 2021 00:00) (81 - 97)  RR: 20 (24 Dec 2021 10:00) (20 - 22)  SpO2: 93% (24 Dec 2021 10:00) (92% - 96%)    PHYSICAL EXAM  General: adult in NAD  HEENT: clear oropharynx, anicteric sclera, pink conjunctiva  Neck: supple  CV: normal S1/S2 with no murmur rubs or gallops  Lungs: left chest tube with serous fluid   Abdomen: soft non-tender non-distended, no hepatosplenomegaly  Ext: no clubbing cyanosis or edema  Skin: no rashes and no petechiae  Neuro: alert and oriented X 4, no focal deficits      LABS:                          11.0   11.28 )-----------( 471      ( 24 Dec 2021 06:03 )             35.0         Mean Cell Volume : 87.5 fl  Mean Cell Hemoglobin : 27.5 pg  Mean Cell Hemoglobin Concentration : 31.4 gm/dL  Auto Neutrophil # : x  Auto Lymphocyte # : x  Auto Monocyte # : x  Auto Eosinophil # : x  Auto Basophil # : x  Auto Neutrophil % : x  Auto Lymphocyte % : x  Auto Monocyte % : x  Auto Eosinophil % : x  Auto Basophil % : x      Serial CBC's  12-24 @ 06:03  Hct-35.0 / Hgb-11.0 / Plat-471 / RBC-4.00 / WBC-11.28  Serial CBC's  12-23 @ 06:51  Hct-35.3 / Hgb-11.0 / Plat-486 / RBC-4.07 / WBC-7.92  Serial CBC's  12-22 @ 20:59  Hct-36.4 / Hgb-11.7 / Plat-470 / RBC-4.16 / WBC-9.43      12-24    134<L>  |  98  |  14.6  ----------------------------<  103<H>  3.5   |  22.0  |  0.81    Ca    8.9      24 Dec 2021 06:01  Phos  3.9     12-23  Mg     2.2     12-23        PT/INR - ( 22 Dec 2021 20:59 )   PT: 15.5 sec;   INR: 1.36 ratio         PTT - ( 22 Dec 2021 20:59 )  PTT:36.7 sec                BLOOD SMEAR INTERPRETATION:       RADIOLOGY & ADDITIONAL STUDIES:

## 2021-12-24 NOTE — PROGRESS NOTE ADULT - PROBLEM SELECTOR PLAN 2
Moderate pericardial effusion on TTE yesterday.  Small on CT scan 12/22.  No plan for pericardial window at this time.  Will repeat TTE tomorrow to re-evaluate, now that pleural effusion has been drained.

## 2021-12-24 NOTE — PROGRESS NOTE ADULT - ASSESSMENT
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 Santana), Anxiety, and Muscle Spasms who presents to the ED with complaints of worsening SOB and found to have bilateral pleural effusions, left sided opacities and worsening lymphangitic spread of disease. Reports recent admission 12/3 for same at [facility] - had CT pleural effusion drainage > 1lit. She was discharged home on 12/13 on 2-4 lit NC oxygen. She had chest tube placement on 12/23 per thoracic sx service with 1lit drained. ECHO - showed moderate to severe pericardial effusion with cardiac tamponade physiology. this was discussed extensively with cardio, thoracic sx for possible pericardial window - but at this time, she is not deemed a good candidate for this.      1. Acute on Chronic Hypoxic Respiratory Failure due to Malignant Effusions and worsening lymphangitic spread of disease; rule out Atypical PNA  - ct step down unit  -currently on Hi-José 80--> 45%   -CT angio negative for PE; bilateral pleural effusions (left > right), left sided opacities (atelectasis vs atypical PNA) and worsening lymphangitic spread of disease  -f/u blood cultures  - empiric abx - low suspicion for PNA - will Dc antibiotics once cultures are back and negative   -BNP within normal range  -Bronchodilators and cough expectorant as needed  -anxiolytics and analgesia as needed  -incentive spirometry  -Recent admission at [facility] earlier this month with a malignant pleural effusion requiring a left sided chest tube.   -CT surgery evaluated patient in the ED; bedside sono with ~500 cc of fluid. Tentative plans for possible pleurex placement   -Pulm following   -monitor respiratory status closely, prognosis guarded  - Onc consult placed - Dr Chapa from [facility]    2. Metastatic, Infiltrating Ductal Carcinoma of the Right Breast  -with liver and bone mets (pathologic, compression fractures of C3 and T2/T7)  -underwent immunotherapy on 12/2 and developed a hypersensitivity reaction  -was scheduled for chemo on 12/27 with Dr. Chapa ([facility])  -breast/liver biopsy and PET scan reports reviewed    3. Anxiety  -on Xanax at home  -will switch to IV ativan while inpatient  -continue escitalopram which was recently started    4. Back Pain due to Pathologic Compression Fractures  -known C3/T2 compression fractures  -CT angio with new T7 compression fracture with mild retropulsion   -Obtain dedicated imaging of T-spine  -MRIs reviewed from November 2021  -ortho spine consulted (Follows with Dr. Santana as outpatient)  -f/u  CT and MRI spine   -Continue Morphine ER 30 mg TID  -Add IV dilaudid for mod/severe pain as needed  -Continue muscle relaxer - on carisoprodol as needed  -Will consider pain management consult if pain is uncontrolled    5. Small Pericardial Effusion  -likely malignant  -TNI negative; BNP within normal range  -patient reports effusion was small on recent TTE at [facility] on 12/12 or 12/13  -patient was seen by [facility] cardio at that time  - TTE - here - moderate pericardial effusion - possible early cardiac tamponade physiology - not a suitable candidate for pericardial window placement at this time per thoracic Sx.      DVT ppx Lovenox    Plan discussed with patient,  CT surgery PA, RN at bedside. Overall prognosis guarded.

## 2021-12-24 NOTE — PROGRESS NOTE ADULT - SUBJECTIVE AND OBJECTIVE BOX
Subjective:  Pt sitting up in bed.  Denies CP.  Reports that her breathing feels a little better today.  NAD noted.                        T(F): 98.3 (21 @ 10:00), Max: 98.8 (21 @ 16:03)  HR: 115 (21 @ 10:00) (84 - 116)  BP: 119/78 (21 @ 10:00) (103/70 - 138/77)  RR: 20 (21 @ 10:00) (20 - 22)  SpO2: 93% (21 @ 10:00) (92% - 96%) on 50% hi flow nasal O2.    Daily     Daily Weight in k (24 Dec 2021 04:25)    Allergies:  Perjeta (Other (Severe))  pertuzumab (Other (Severe))          134<L>  |  98  |  14.6  ----------------------------<  103<H>  3.5   |  22.0  |  0.81    Ca    8.9      24 Dec 2021 06:01  Phos  3.9     12-23  Mg     2.2     12-23                                 11.0   11.28 )-----------( 471      ( 24 Dec 2021 06:03 )             35.0        PT/INR - ( 22 Dec 2021 20:59 )   PT: 15.5 sec;   INR: 1.36 ratio         PTT - ( 22 Dec 2021 20:59 )  PTT:36.7 sec     CXR: < from: Xray Chest 1 View- PORTABLE-Routine (Xray Chest 1 View- PORTABLE-Routine in AM.) (21 @ 05:16) >  Findings/  Impression: There is a right chest wall Mediport and a left chest tube.   No pleural effusion or pneumothorax. Moderate to severe multifocal patchy   opacification of the lungs is worsened compared to prior.    --- End of Report ---    CROW SOSA MD; Attending Interventional Radiologist  This document has been electronically signed. Dec 24 2021 10:50AM    < end of copied text >      I&O's Detail    23 Dec 2021 07:01  -  24 Dec 2021 07:00  --------------------------------------------------------  IN:    IV PiggyBack: 200 mL    IV PiggyBack: 250 mL  Total IN: 450 mL    OUT:    Chest Tube (mL): 1370 mL  Total OUT: 1370 mL    Total NET: -920 mL      CHEST TUBE:  [x ] YES [ ] NO  OUTPUT:  Left pigtail with 1400ml total output since insertion yesterday.     AIR LEAKS:  [ ] YES [x ] NO      Active Medications:  acetaminophen     Tablet .. 650 milliGRAM(s) Oral every 6 hours PRN  albuterol/ipratropium for Nebulization 3 milliLiter(s) Nebulizer every 6 hours PRN  Carisoprodol 250 milliGRAM(s) 250 milliGRAM(s) Oral four times a day PRN  enoxaparin Injectable 40 milliGRAM(s) SubCutaneous daily  escitalopram 5 milliGRAM(s) Oral daily  guaiFENesin Oral Liquid (Sugar-Free) 200 milliGRAM(s) Oral every 6 hours PRN  HYDROmorphone  Injectable 1 milliGRAM(s) IV Push every 4 hours PRN  HYDROmorphone  Injectable 0.5 milliGRAM(s) IV Push every 4 hours PRN  LORazepam   Injectable 0.5 milliGRAM(s) IV Push three times a day PRN  modafinil 100 milliGRAM(s) Oral daily  morphine ER Tablet 30 milliGRAM(s) Oral three times a day  piperacillin/tazobactam IVPB.. 3.375 Gram(s) IV Intermittent every 8 hours  promethazine 12.5 milliGRAM(s) Oral two times a day PRN  vancomycin  IVPB 1000 milliGRAM(s) IV Intermittent every 12 hours      Physical Exam:    Neuro: AAOX3.      Pulm: Left pigtail without airleak.      CV:                     PAST MEDICAL & SURGICAL HISTORY:  Breast CA    H/O compression fracture of spine    Anxiety    S/P tonsillectomy                 Subjective:  Pt sitting up in bed.  Denies CP.  Reports that her breathing feels a little better today.  NAD noted.                        T(F): 98.3 (21 @ 10:00), Max: 98.8 (21 @ 16:03)  HR: 115 (21 @ 10:00) (84 - 116)  BP: 119/78 (21 @ 10:00) (103/70 - 138/77)  RR: 20 (21 @ 10:00) (20 - 22)  SpO2: 93% (21 @ 10:00) (92% - 96%) on 50% hi flow nasal O2.    Daily     Daily Weight in k (24 Dec 2021 04:25)    Allergies:  Perjeta (Other (Severe))  pertuzumab (Other (Severe))          134<L>  |  98  |  14.6  ----------------------------<  103<H>  3.5   |  22.0  |  0.81    Ca    8.9      24 Dec 2021 06:01  Phos  3.9     12-23  Mg     2.2     12-23                                 11.0   11.28 )-----------( 471      ( 24 Dec 2021 06:03 )             35.0        PT/INR - ( 22 Dec 2021 20:59 )   PT: 15.5 sec;   INR: 1.36 ratio         PTT - ( 22 Dec 2021 20:59 )  PTT:36.7 sec     CXR: < from: Xray Chest 1 View- PORTABLE-Routine (Xray Chest 1 View- PORTABLE-Routine in AM.) (21 @ 05:16) >  Findings/  Impression: There is a right chest wall Mediport and a left chest tube.   No pleural effusion or pneumothorax. Moderate to severe multifocal patchy   opacification of the lungs is worsened compared to prior.    --- End of Report ---    CROW SOSA MD; Attending Interventional Radiologist  This document has been electronically signed. Dec 24 2021 10:50AM    < end of copied text >      I&O's Detail    23 Dec 2021 07:01  -  24 Dec 2021 07:00  --------------------------------------------------------  IN:    IV PiggyBack: 200 mL    IV PiggyBack: 250 mL  Total IN: 450 mL    OUT:    Chest Tube (mL): 1370 mL  Total OUT: 1370 mL    Total NET: -920 mL      CHEST TUBE:  [x ] YES [ ] NO  OUTPUT:  Left pigtail with 1400ml total output since insertion yesterday.     AIR LEAKS:  [ ] YES [x ] NO      Active Medications:  acetaminophen     Tablet .. 650 milliGRAM(s) Oral every 6 hours PRN  albuterol/ipratropium for Nebulization 3 milliLiter(s) Nebulizer every 6 hours PRN  Carisoprodol 250 milliGRAM(s) 250 milliGRAM(s) Oral four times a day PRN  enoxaparin Injectable 40 milliGRAM(s) SubCutaneous daily  escitalopram 5 milliGRAM(s) Oral daily  guaiFENesin Oral Liquid (Sugar-Free) 200 milliGRAM(s) Oral every 6 hours PRN  HYDROmorphone  Injectable 1 milliGRAM(s) IV Push every 4 hours PRN  HYDROmorphone  Injectable 0.5 milliGRAM(s) IV Push every 4 hours PRN  LORazepam   Injectable 0.5 milliGRAM(s) IV Push three times a day PRN  modafinil 100 milliGRAM(s) Oral daily  morphine ER Tablet 30 milliGRAM(s) Oral three times a day  piperacillin/tazobactam IVPB.. 3.375 Gram(s) IV Intermittent every 8 hours  promethazine 12.5 milliGRAM(s) Oral two times a day PRN  vancomycin  IVPB 1000 milliGRAM(s) IV Intermittent every 12 hours      Physical Exam:    Neuro: AAOX3.      Pulm: Left pigtail without airleak.  Diminished throughout with scattered rhonchi.    CV: RRR.  +S1+S2.    Extremities:  No edema BLE. +pp.         PAST MEDICAL & SURGICAL HISTORY:  Breast CA    H/O compression fracture of spine    Anxiety    S/P tonsillectomy

## 2021-12-24 NOTE — PROGRESS NOTE ADULT - SUBJECTIVE AND OBJECTIVE BOX
[facility]                                   [address] [place] [address]                                                      PHONE: [phone]                                                         FAX: [phone]  [email or web address]  ---------------------------------------------------------------------------------------------------------------------------------    Overnight events/patient complaints:  No cp/sob    PAST MEDICAL & SURGICAL HISTORY:  Breast CA    H/O compression fracture of spine    Anxiety    S/P tonsillectomy        Perjeta (Other (Severe))  pertuzumab (Other (Severe))    MEDICATIONS  (STANDING):  enoxaparin Injectable 40 milliGRAM(s) SubCutaneous daily  escitalopram 5 milliGRAM(s) Oral daily  modafinil 100 milliGRAM(s) Oral daily  morphine ER Tablet 30 milliGRAM(s) Oral three times a day  piperacillin/tazobactam IVPB.. 3.375 Gram(s) IV Intermittent every 8 hours  vancomycin  IVPB 1000 milliGRAM(s) IV Intermittent every 12 hours    MEDICATIONS  (PRN):  acetaminophen     Tablet .. 650 milliGRAM(s) Oral every 6 hours PRN Temp greater or equal to 38C (100.4F), Mild Pain (1 - 3)  albuterol/ipratropium for Nebulization 3 milliLiter(s) Nebulizer every 6 hours PRN Shortness of Breath and/or Wheezing  Carisoprodol 250 milliGRAM(s) 250 milliGRAM(s) Oral four times a day PRN muscle spasm  guaiFENesin Oral Liquid (Sugar-Free) 200 milliGRAM(s) Oral every 6 hours PRN Cough  HYDROmorphone  Injectable 1 milliGRAM(s) IV Push every 4 hours PRN Severe Pain (7 - 10)  HYDROmorphone  Injectable 0.5 milliGRAM(s) IV Push every 4 hours PRN Moderate Pain (4 - 6)  LORazepam   Injectable 0.5 milliGRAM(s) IV Push three times a day PRN Anxiety  promethazine 12.5 milliGRAM(s) Oral two times a day PRN nausea      Vital Signs Last 24 Hrs  T(C): 36.8 (24 Dec 2021 10:00), Max: 37.1 (23 Dec 2021 16:03)  T(F): 98.3 (24 Dec 2021 10:00), Max: 98.8 (23 Dec 2021 16:03)  HR: 115 (24 Dec 2021 10:00) (84 - 117)  BP: 119/78 (24 Dec 2021 10:00) (103/70 - 138/77)  BP(mean): 81 (24 Dec 2021 00:00) (81 - 97)  RR: 20 (24 Dec 2021 10:00) (20 - 24)  SpO2: 93% (24 Dec 2021 10:00) (92% - 96%)  ICU Vital Signs Last 24 Hrs  NATIVIDAD MYERS  I&O's Detail    23 Dec 2021 07:01  -  24 Dec 2021 07:00  --------------------------------------------------------  IN:    IV PiggyBack: 200 mL    IV PiggyBack: 250 mL  Total IN: 450 mL    OUT:    Chest Tube (mL): 1370 mL  Total OUT: 1370 mL    Total NET: -920 mL        I&O's Summary    23 Dec 2021 07:01  -  24 Dec 2021 07:00  --------------------------------------------------------  IN: 450 mL / OUT: 1370 mL / NET: -920 mL      Drug Dosing Weight  NATIVIDAD MYERS      PHYSICAL EXAM:  General: Appears well developed, well nourished alert and cooperative.  HEENT: Head; normocephalic, atraumatic.  Eyes: Pupils reactive, cornea wnl.  Neck: Supple, no nodes adenopathy, no NVD or carotid bruit or thyromegaly.  CARDIOVASCULAR: Normal S1 and S2, No murmur, rub, gallop or lift.   LUNGS: No rales, rhonchi or wheeze. Normal breath sounds bilaterally.  ABDOMEN: Soft, nontender without mass or organomegaly. bowel sounds normoactive.  EXTREMITIES: No clubbing, cyanosis or edema. Distal pulses wnl.   SKIN: warm and dry with normal turgor.  NEURO: Alert/oriented x 3/normal motor exam. No pathologic reflexes.    PSYCH: normal affect.        LABS:                        11.0   11.28 )-----------( 471      ( 24 Dec 2021 06:03 )             35.0     12-24    134<L>  |  98  |  14.6  ----------------------------<  103<H>  3.5   |  22.0  |  0.81    Ca    8.9      24 Dec 2021 06:01  Phos  3.9     12-23  Mg     2.2     12-23      NATIVIDAD MYERS  CARDIAC MARKERS ( 22 Dec 2021 20:59 )  x     / <0.01 ng/mL / x     / x     / x          PT/INR - ( 22 Dec 2021 20:59 )   PT: 15.5 sec;   INR: 1.36 ratio         PTT - ( 22 Dec 2021 20:59 )  PTT:36.7 sec      RADIOLOGY & ADDITIONAL STUDIES:    INTERPRETATION OF TELEMETRY (personally reviewed):    ECG:    ECHO:    STRESS TEST:    CARDIAC CATHETERIZATION:    ASSESSMENT AND PLAN:  In summary, NATIVIDAD MYERS is an 36y Female with past medical history significant for metast breast cancer w pleural and pericardial eff.  Stable.  No indication for pericard drainage at present.      Thank you for allowing [facility] to participate in the care of this patient.  Please feel free to call with any questions or concerns.

## 2021-12-24 NOTE — PROGRESS NOTE ADULT - ASSESSMENT
Imp--widely metastatic breast Ca with worsening lung mets and lymphangitic spread.  Acute hypoxemic resp failure secondary to above  s/p CT for pl eff without sig improvement in hypoxemia.  Plan--Continue O2, CT drainage.  Prognosis very poor.  GOC need to be addressed.

## 2021-12-24 NOTE — PROGRESS NOTE ADULT - SUBJECTIVE AND OBJECTIVE BOX
PULMONARY PROGRESS NOTE      [id number]    Patient is a 36y old  Female who presents with a chief complaint of SOB (23 Dec 2021 15:47)      INTERVAL HPI/OVERNIGHT EVENTS:remains on HFNC, s/p CT    MEDICATIONS  (STANDING):  enoxaparin Injectable 40 milliGRAM(s) SubCutaneous daily  escitalopram 5 milliGRAM(s) Oral daily  modafinil 100 milliGRAM(s) Oral daily  morphine ER Tablet 30 milliGRAM(s) Oral three times a day  piperacillin/tazobactam IVPB.. 3.375 Gram(s) IV Intermittent every 8 hours  vancomycin  IVPB 1000 milliGRAM(s) IV Intermittent every 12 hours      MEDICATIONS  (PRN):  acetaminophen     Tablet .. 650 milliGRAM(s) Oral every 6 hours PRN Temp greater or equal to 38C (100.4F), Mild Pain (1 - 3)  albuterol/ipratropium for Nebulization 3 milliLiter(s) Nebulizer every 6 hours PRN Shortness of Breath and/or Wheezing  Carisoprodol 250 milliGRAM(s) 250 milliGRAM(s) Oral four times a day PRN muscle spasm  guaiFENesin Oral Liquid (Sugar-Free) 200 milliGRAM(s) Oral every 6 hours PRN Cough  HYDROmorphone  Injectable 1 milliGRAM(s) IV Push every 4 hours PRN Severe Pain (7 - 10)  HYDROmorphone  Injectable 0.5 milliGRAM(s) IV Push every 4 hours PRN Moderate Pain (4 - 6)  LORazepam   Injectable 0.5 milliGRAM(s) IV Push three times a day PRN Anxiety  promethazine 12.5 milliGRAM(s) Oral two times a day PRN nausea      Allergies    Perjeta (Other (Severe))  pertuzumab (Other (Severe))    Intolerances        PAST MEDICAL & SURGICAL HISTORY:  Breast CA    H/O compression fracture of spine    Anxiety    S/P tonsillectomy        SOCIAL HISTORY  Smoking History:       REVIEW OF SYSTEMS:    CONSTITUTIONAL:  per HPI    HEENT:  Eyes:  No diplopia or blurred vision. ENT:  No earache, sore throat or runny nose.    CARDIOVASCULAR:  No pressure, squeezing, tightness, heaviness or aching about the chest; no palpitations.    RESPIRATORY:  per hpi    GASTROINTESTINAL:  No nausea, vomiting or diarrhea.    GENITOURINARY:  No dysuria, frequency or urgency.    MUSCULOSKELETAL:  No joint pain    SKIN:  No new lesions.    NEUROLOGIC:  No paresthesias, fasciculations, seizures or weakness.    PSYCHIATRIC:  No disorder of thought or mood.    ENDOCRINE:  No heat or cold intolerance, polyuria or polydipsia.    HEMATOLOGICAL:  No easy bruising or bleeding.     Vital Signs Last 24 Hrs  T(C): 36.8 (24 Dec 2021 10:00), Max: 37.1 (23 Dec 2021 16:03)  T(F): 98.3 (24 Dec 2021 10:00), Max: 98.8 (23 Dec 2021 16:03)  HR: 115 (24 Dec 2021 10:00) (84 - 117)  BP: 119/78 (24 Dec 2021 10:00) (103/70 - 138/77)  BP(mean): 81 (24 Dec 2021 00:00) (81 - 97)  RR: 20 (24 Dec 2021 10:00) (20 - 24)  SpO2: 93% (24 Dec 2021 10:00) (92% - 96%)    PHYSICAL EXAMINATION:    GENERAL: The patient is awake and alert in no apparent distress.     HEENT: Head is normocephalic and atraumatic. Extraocular muscles are intact. Mucous membranes are moist.    NECK: Supple.    LUNGS: coarse bs bilat    HEART: Regular rate and rhythm without murmur.    ABDOMEN: Soft, nontender, and nondistended.      EXTREMITIES: Without any cyanosis, clubbing, rash, lesions or edema.    NEUROLOGIC: Grossly intact.    SKIN: No ulceration or induration present.      LABS:                        11.0   11.28 )-----------( 471      ( 24 Dec 2021 06:03 )             35.0     12-24    134<L>  |  98  |  14.6  ----------------------------<  103<H>  3.5   |  22.0  |  0.81    Ca    8.9      24 Dec 2021 06:01  Phos  3.9     12-23  Mg     2.2     12-23      PT/INR - ( 22 Dec 2021 20:59 )   PT: 15.5 sec;   INR: 1.36 ratio         PTT - ( 22 Dec 2021 20:59 )  PTT:36.7 sec    ABG - ( 24 Dec 2021 04:37 )  pH, Arterial: 7.420 pH, Blood: x     /  pCO2: 43    /  pO2: 81    / HCO3: 28    / Base Excess: 3.4   /  SaO2: 97.8  (50% hfnc)            CARDIAC MARKERS ( 22 Dec 2021 20:59 )  x     / <0.01 ng/mL / x     / x     / x            Serum Pro-Brain Natriuretic Peptide: 58 pg/mL (12-22-21 @ 20:59)      Procalcitonin, Serum: 0.09 ng/mL (12-23-21 @ 01:02)      MICROBIOLOGY:    RADIOLOGY & ADDITIONAL STUDIES:< from: Xray Chest 1 View- PORTABLE-Routine (Xray Chest 1 View- PORTABLE-Routine in AM.) (12.24.21 @ 05:16) >  Impression: There is a right chest wall Mediport and a left chest tube.   No pleural effusion or pneumothorax. Moderate to severe multifocal patchy   opacification of the lungs is worsened compared to prior.    < end of copied text >

## 2021-12-24 NOTE — PROGRESS NOTE ADULT - ASSESSMENT
ASSESSMENT:   36 year old female with recently diagnosed Metastatic Right Breast CA (Infiltrating Ductal Carcinoma with mets to the Spine and Liver), Malignant Pleural Effusion on Home O2 (3-5 liters), Pathologic Compression Fractures (Follows with Dr. Santana), Anxiety, and Muscle Spasms who presents to the ED with complaints of worsening SOB and found to have bilateral pleural effusions, left sided opacities and worsening lymphangitic spread of disease. Patient requiring BiPAP on arrival for oxygenation, currently switched to HFNC. CT spine also showing new acute T2 and T7 compression fractures, bony mets. Further imaging pending.  Thoracic surgery consulted for pleural effusion.  12/23 left pigtail placed for serous fluid.

## 2021-12-24 NOTE — CONSULT NOTE ADULT - ASSESSMENT
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 Santana), Anxiety, and Muscle Spasms who presents to the ED with complaints of worsening SOB.    CT angio was negative for PE, but revealed bilateral effusions (larger on the left), worsening lymphangitic spread and left sided opacities (Atelectasis vs PNA).    pt s/p chest tube with drainage of 1 L of serous fluid   pt more comfortable now; sitting in bed   no fevers.  on HiFlo O2 at 45%    Metastatic Her 2 + breast ca   - poor tolerance to Perjeta   - rapidly progressive disease.  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 Santana), Anxiety, and Muscle Spasms who presents to the ED with complaints of worsening SOB.    CT angio was negative for PE, but revealed bilateral effusions (larger on the left), worsening lymphangitic spread and left sided opacities (Atelectasis vs PNA).    pt s/p chest tube with drainage of 1 L of serous fluid   pt more comfortable now; sitting in bed   no fevers.  on HiFlo O2 at 45%    Metastatic Her 2 + breast ca   - poor tolerance to Perjeta   - rapidly progressive disease with malignant pleural effusion and lymphangitic spread with resp compromise   - d/w her primary Oncologist Dr Chapa; will plan to start chemo with Taxotere + Herceptin asap.  d/w nurse manager and pharmacy.  Chemo drugs unavailable at this time.  May need to wait till Monday.  Pharmacy to attempt to borrow from another facility.    - chemo orders written and in chart. consent obtained   - close pulm f/u .  recommend high dose steroids.     Malignant Pleural / pericardial effusion   - s/p Left chest tube   - TTE to eval pericardial effusion   - CT sx following     Niraj Elkins MD  [facility]  [phone]  36 year old female with recently diagnosed Metastatic Right Breast CA (Infiltrating Ductal Carcinoma with mets to the Spine and Liver), Malignant Pleural Effusion on Home O2 (3-5 liters), Pathologic Compression Fractures (Follows with Dr. Santana), Anxiety, and Muscle Spasms who presents to the ED with complaints of worsening SOB.    CT angio was negative for PE, but revealed bilateral effusions (larger on the left), worsening lymphangitic spread and left sided opacities (Atelectasis vs PNA).    pt s/p chest tube with drainage of 1 L of serous fluid   pt more comfortable now; sitting in bed   no fevers.  on HiFlo O2 at 45%    Metastatic Her 2 + breast ca   - poor tolerance to Perjeta   - rapidly progressive disease with malignant pleural effusion and lymphangitic spread with resp compromise   - d/w her primary Oncologist Dr Chapa; will plan to start chemo with Taxotere + Herceptin asap.  d/w nurse manager and pharmacy.  Chemo drugs unavailable at this time.  May need to wait till Monday.  Pharmacy to attempt to borrow from another facility.    - chemo orders written and in chart. consent obtained   - close pulm f/u .  d/w Pulm .  will give trial of high dose steroids.  Solumedrol 60mg IV Q6hrs     Malignant Pleural / pericardial effusion   - s/p Left chest tube   - TTE to eval pericardial effusion   - CT sx following     Niraj Elkins MD  [facility]  [phone]

## 2021-12-24 NOTE — PROGRESS NOTE ADULT - PROBLEM SELECTOR PLAN 1
12/22 CT Angio chest showing stable small-moderate Left pleural effusion and small right pleural effusion with extensive lymphangitis metastases within the lungs, with atelectasis, pulm .  Now s/p left pigtail yesterday for a total of 1400ml serous fluid.  Maintain left pigtail.  Place left pigtail to suction today per Dr. Sow.  Plan likely for left pleurx catheter prior to discharge.  Repeat TTE ordered for tomorrow to evaluate pericardial effusion now that pleural effusion has been drained.  Discussed with Dr. Sow.

## 2021-12-25 LAB
ANION GAP SERPL CALC-SCNC: 15 MMOL/L — SIGNIFICANT CHANGE UP (ref 5–17)
BUN SERPL-MCNC: 17.8 MG/DL — SIGNIFICANT CHANGE UP (ref 8–20)
CALCIUM SERPL-MCNC: 8.7 MG/DL — SIGNIFICANT CHANGE UP (ref 8.6–10.2)
CHLORIDE SERPL-SCNC: 101 MMOL/L — SIGNIFICANT CHANGE UP (ref 98–107)
CO2 SERPL-SCNC: 23 MMOL/L — SIGNIFICANT CHANGE UP (ref 22–29)
CREAT SERPL-MCNC: 1.48 MG/DL — HIGH (ref 0.5–1.3)
GLUCOSE SERPL-MCNC: 144 MG/DL — HIGH (ref 70–99)
HCT VFR BLD CALC: 36.5 % — SIGNIFICANT CHANGE UP (ref 34.5–45)
HGB BLD-MCNC: 11.5 G/DL — SIGNIFICANT CHANGE UP (ref 11.5–15.5)
LDH SERPL L TO P-CCNC: 396 U/L — HIGH (ref 98–192)
MCHC RBC-ENTMCNC: 27.2 PG — SIGNIFICANT CHANGE UP (ref 27–34)
MCHC RBC-ENTMCNC: 31.5 GM/DL — LOW (ref 32–36)
MCV RBC AUTO: 86.3 FL — SIGNIFICANT CHANGE UP (ref 80–100)
PLATELET # BLD AUTO: 484 K/UL — HIGH (ref 150–400)
POTASSIUM SERPL-MCNC: 4.3 MMOL/L — SIGNIFICANT CHANGE UP (ref 3.5–5.3)
POTASSIUM SERPL-SCNC: 4.3 MMOL/L — SIGNIFICANT CHANGE UP (ref 3.5–5.3)
PROT SERPL-MCNC: 6.9 G/DL — SIGNIFICANT CHANGE UP (ref 6.6–8.7)
RBC # BLD: 4.23 M/UL — SIGNIFICANT CHANGE UP (ref 3.8–5.2)
RBC # FLD: 14.8 % — HIGH (ref 10.3–14.5)
SODIUM SERPL-SCNC: 139 MMOL/L — SIGNIFICANT CHANGE UP (ref 135–145)
VANCOMYCIN FLD-MCNC: 17.4 UG/ML — SIGNIFICANT CHANGE UP
WBC # BLD: 7.37 K/UL — SIGNIFICANT CHANGE UP (ref 3.8–10.5)
WBC # FLD AUTO: 7.37 K/UL — SIGNIFICANT CHANGE UP (ref 3.8–10.5)

## 2021-12-25 PROCEDURE — 71045 X-RAY EXAM CHEST 1 VIEW: CPT | Mod: 26

## 2021-12-25 PROCEDURE — 99223 1ST HOSP IP/OBS HIGH 75: CPT

## 2021-12-25 PROCEDURE — 99233 SBSQ HOSP IP/OBS HIGH 50: CPT

## 2021-12-25 PROCEDURE — 99231 SBSQ HOSP IP/OBS SF/LOW 25: CPT

## 2021-12-25 RX ORDER — DEXAMETHASONE 0.5 MG/5ML
10 ELIXIR ORAL ONCE
Refills: 0 | Status: COMPLETED | OUTPATIENT
Start: 2021-12-27 | End: 2021-12-27

## 2021-12-25 RX ORDER — VANCOMYCIN HCL 1 G
750 VIAL (EA) INTRAVENOUS EVERY 12 HOURS
Refills: 0 | Status: DISCONTINUED | OUTPATIENT
Start: 2021-12-25 | End: 2021-12-25

## 2021-12-25 RX ADMIN — MORPHINE SULFATE 30 MILLIGRAM(S): 50 CAPSULE, EXTENDED RELEASE ORAL at 21:48

## 2021-12-25 RX ADMIN — PIPERACILLIN AND TAZOBACTAM 25 GRAM(S): 4; .5 INJECTION, POWDER, LYOPHILIZED, FOR SOLUTION INTRAVENOUS at 14:14

## 2021-12-25 RX ADMIN — Medication 0.5 MILLIGRAM(S): at 14:46

## 2021-12-25 RX ADMIN — Medication 60 MILLIGRAM(S): at 05:16

## 2021-12-25 RX ADMIN — MORPHINE SULFATE 30 MILLIGRAM(S): 50 CAPSULE, EXTENDED RELEASE ORAL at 22:48

## 2021-12-25 RX ADMIN — HYDROMORPHONE HYDROCHLORIDE 1 MILLIGRAM(S): 2 INJECTION INTRAMUSCULAR; INTRAVENOUS; SUBCUTANEOUS at 11:36

## 2021-12-25 RX ADMIN — HYDROMORPHONE HYDROCHLORIDE 1 MILLIGRAM(S): 2 INJECTION INTRAMUSCULAR; INTRAVENOUS; SUBCUTANEOUS at 03:30

## 2021-12-25 RX ADMIN — Medication 0.5 MILLIGRAM(S): at 20:28

## 2021-12-25 RX ADMIN — MORPHINE SULFATE 30 MILLIGRAM(S): 50 CAPSULE, EXTENDED RELEASE ORAL at 05:15

## 2021-12-25 RX ADMIN — LIDOCAINE 1 PATCH: 4 CREAM TOPICAL at 05:00

## 2021-12-25 RX ADMIN — Medication 60 MILLIGRAM(S): at 17:28

## 2021-12-25 RX ADMIN — Medication 60 MILLIGRAM(S): at 11:21

## 2021-12-25 RX ADMIN — MORPHINE SULFATE 30 MILLIGRAM(S): 50 CAPSULE, EXTENDED RELEASE ORAL at 06:50

## 2021-12-25 RX ADMIN — Medication 3 MILLILITER(S): at 21:29

## 2021-12-25 RX ADMIN — ESCITALOPRAM OXALATE 5 MILLIGRAM(S): 10 TABLET, FILM COATED ORAL at 11:20

## 2021-12-25 RX ADMIN — MORPHINE SULFATE 30 MILLIGRAM(S): 50 CAPSULE, EXTENDED RELEASE ORAL at 14:13

## 2021-12-25 RX ADMIN — LIDOCAINE 1 PATCH: 4 CREAM TOPICAL at 23:58

## 2021-12-25 RX ADMIN — HYDROMORPHONE HYDROCHLORIDE 1 MILLIGRAM(S): 2 INJECTION INTRAMUSCULAR; INTRAVENOUS; SUBCUTANEOUS at 06:58

## 2021-12-25 RX ADMIN — Medication 250 MILLIGRAM(S): at 09:47

## 2021-12-25 RX ADMIN — HYDROMORPHONE HYDROCHLORIDE 1 MILLIGRAM(S): 2 INJECTION INTRAMUSCULAR; INTRAVENOUS; SUBCUTANEOUS at 02:51

## 2021-12-25 RX ADMIN — HYDROMORPHONE HYDROCHLORIDE 1 MILLIGRAM(S): 2 INJECTION INTRAMUSCULAR; INTRAVENOUS; SUBCUTANEOUS at 11:21

## 2021-12-25 RX ADMIN — PIPERACILLIN AND TAZOBACTAM 25 GRAM(S): 4; .5 INJECTION, POWDER, LYOPHILIZED, FOR SOLUTION INTRAVENOUS at 21:48

## 2021-12-25 RX ADMIN — Medication 0.5 MILLIGRAM(S): at 22:41

## 2021-12-25 RX ADMIN — MORPHINE SULFATE 30 MILLIGRAM(S): 50 CAPSULE, EXTENDED RELEASE ORAL at 14:27

## 2021-12-25 RX ADMIN — HYDROMORPHONE HYDROCHLORIDE 1 MILLIGRAM(S): 2 INJECTION INTRAMUSCULAR; INTRAVENOUS; SUBCUTANEOUS at 20:29

## 2021-12-25 RX ADMIN — HYDROMORPHONE HYDROCHLORIDE 1 MILLIGRAM(S): 2 INJECTION INTRAMUSCULAR; INTRAVENOUS; SUBCUTANEOUS at 20:45

## 2021-12-25 RX ADMIN — PIPERACILLIN AND TAZOBACTAM 25 GRAM(S): 4; .5 INJECTION, POWDER, LYOPHILIZED, FOR SOLUTION INTRAVENOUS at 05:17

## 2021-12-25 RX ADMIN — ENOXAPARIN SODIUM 40 MILLIGRAM(S): 100 INJECTION SUBCUTANEOUS at 11:20

## 2021-12-25 RX ADMIN — MODAFINIL 100 MILLIGRAM(S): 200 TABLET ORAL at 11:20

## 2021-12-25 RX ADMIN — LIDOCAINE 1 PATCH: 4 CREAM TOPICAL at 11:21

## 2021-12-25 RX ADMIN — LIDOCAINE 1 PATCH: 4 CREAM TOPICAL at 19:35

## 2021-12-25 NOTE — PROGRESS NOTE ADULT - SUBJECTIVE AND OBJECTIVE BOX
SUBJECTIVE:  Pt in bed talking to family on the phone. Pt states, I am feeling much better this morning?  Patient c/o of chronic back that is part of her normal  She denies chest pain, shortness of breath, palpitations, headache, dizziness, nausea, or vomiting.      PAST MEDICAL & SURGICAL HISTORY:  Breast CA    H/O compression fracture of spine    Anxiety    S/P tonsillectomy        MEDICATIONS  acetaminophen     Tablet .. 650 milliGRAM(s) Oral every 6 hours PRN  albuterol/ipratropium for Nebulization 3 milliLiter(s) Nebulizer every 6 hours PRN  Carisoprodol 250 milliGRAM(s) 250 milliGRAM(s) Oral four times a day PRN  enoxaparin Injectable 40 milliGRAM(s) SubCutaneous daily  escitalopram 5 milliGRAM(s) Oral daily  guaiFENesin Oral Liquid (Sugar-Free) 200 milliGRAM(s) Oral every 6 hours PRN  HYDROmorphone  Injectable 1 milliGRAM(s) IV Push every 4 hours PRN  HYDROmorphone  Injectable 0.5 milliGRAM(s) IV Push every 4 hours PRN  lidocaine   4% Patch 1 Patch Transdermal daily  LORazepam   Injectable 0.5 milliGRAM(s) IV Push three times a day PRN  methylPREDNISolone sodium succinate Injectable 60 milliGRAM(s) IV Push every 6 hours  modafinil 100 milliGRAM(s) Oral daily  morphine ER Tablet 30 milliGRAM(s) Oral three times a day  piperacillin/tazobactam IVPB.. 3.375 Gram(s) IV Intermittent every 8 hours  promethazine 12.5 milliGRAM(s) Oral two times a day PRN  MEDICATIONS  (PRN):  acetaminophen     Tablet .. 650 milliGRAM(s) Oral every 6 hours PRN Temp greater or equal to 38C (100.4F), Mild Pain (1 - 3)  albuterol/ipratropium for Nebulization 3 milliLiter(s) Nebulizer every 6 hours PRN Shortness of Breath and/or Wheezing  Carisoprodol 250 milliGRAM(s) 250 milliGRAM(s) Oral four times a day PRN muscle spasm  guaiFENesin Oral Liquid (Sugar-Free) 200 milliGRAM(s) Oral every 6 hours PRN Cough  HYDROmorphone  Injectable 1 milliGRAM(s) IV Push every 4 hours PRN Severe Pain (7 - 10)  HYDROmorphone  Injectable 0.5 milliGRAM(s) IV Push every 4 hours PRN Moderate Pain (4 - 6)  LORazepam   Injectable 0.5 milliGRAM(s) IV Push three times a day PRN Anxiety  promethazine 12.5 milliGRAM(s) Oral two times a day PRN nausea      Daily     Daily Weight in k.4 (25 Dec 2021 04:00)      ABG - ( 24 Dec 2021 04:37 )  pH, Arterial: 7.420 pH, Blood: x     /  pCO2: 43    /  pO2: 81    / HCO3: 28    / Base Excess: 3.4   /  SaO2: 97.8                                    11.5   7.37  )-----------( 484      ( 25 Dec 2021 07:37 )             36.5       139  |  101  |  17.8  ----------------------------<  144<H>  4.3   |  23.0  |  1.48<H>    Ca    8.7      25 Dec 2021 07:37    TPro  6.9  /  Alb  x   /  TBili  x   /  DBili  x   /  AST  x   /  ALT  x   /  AlkPhos  x           Objective:  T(C): 36.9 (21 @ 06:45), Max: 37.1 (21 @ 15:47)  HR: 90 (21 @ 06:45) (81 - 111)  BP: 136/91 (21 @ 06:45) (114/77 - 143/85)  RR: 18 (21 @ 06:45) (18 - 20)  SpO2: 97% (21 @ 06:45) (94% - 98%)  Wt(kg): --CAPILLARY BLOOD GLUCOSE      I&O's Summary    24 Dec 2021 07:01  -  25 Dec 2021 07:00  --------------------------------------------------------  IN: 1010 mL / OUT: 1320 mL / NET: -310 mL        Physical Exam  Neuro: A+O x 3, non-focal, speech clear and intact  Pulm: CTA, equal bilaterally  CV: RRR, +S1S2  Abd: soft, NT, ND, +BS  Ext: +DP Pulses b/l, +PT pulses, no edema  Chest tubes: Left pigtail to suction     Imaging:  CXR:  < from: Xray Chest 1 View- PORTABLE-Routine (Xray Chest 1 View- PORTABLE-Routine in AM.) (. @ 05:16) >    INTERPRETATION:  Clinical Information: Chest tube placement.    Technique: 2 AP chest x-ray(s).    Comparison: None    Findings/  Impression: There is a right chest wall Mediport and a left chest tube.   No pleural effusion or pneumothorax. Moderate to severe multifocal patchy   opacification of the lungs is worsened compared to prior.    --- End of Report ---    < end of copied text >

## 2021-12-25 NOTE — CONSULT NOTE ADULT - CONSULT REASON
Pleural effusion, SOB
Breast Ca
Left pleural effusion
compression fractures
Dyspnea   pericardial effusion
Shortness of breath
antibiotic management

## 2021-12-25 NOTE — PROGRESS NOTE ADULT - ASSESSMENT
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 Santana), Anxiety, and Muscle Spasms who presents to the ED with complaints of worsening SOB.    CT angio was negative for PE, but revealed bilateral effusions (larger on the left), worsening lymphangitic spread and left sided opacities (Atelectasis vs PNA).    pt s/p chest tube with drainage of 1 L of serous fluid   pt more comfortable now; sitting in bed   no fevers.  on HiFlo O2 at 35%    Metastatic Her 2 + breast ca   - poor tolerance to Perjeta   - rapidly progressive disease with malignant pleural effusion and lymphangitic spread with resp compromise   - d/w her primary Oncologist Dr Chapa; will plan to start chemo with Taxotere + Herceptin asap.  d/w nurse manager and pharmacy.  Chemo drugs unavailable at this time.  May need to wait till Monday.  Pharmacy to attempt to borrow from another facility.    - chemo orders written and in chart. consent obtained   - close pulm f/u .  d/w Pulm .  will give trial of high dose steroids.  Solumedrol 60mg IV Q6hrs     Malignant Pleural / pericardial effusion   - s/p Left chest tube   - TTE to eval pericardial effusion   - CT sx following     Joseph Rivas MD  [facility]  [phone]

## 2021-12-25 NOTE — CONSULT NOTE ADULT - CONSULT REQUESTED DATE/TIME
23-Dec-2021 13:38
23-Dec-2021 01:37
24-Dec-2021 13:41
23-Dec-2021 15:47
25-Dec-2021 11:05
22-Dec-2021 21:30
23-Dec-2021 04:49

## 2021-12-25 NOTE — PROGRESS NOTE ADULT - ASSESSMENT
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 Santana), Anxiety, and Muscle Spasms who presents to the ED with complaints of worsening SOB and found to have bilateral pleural effusions, left sided opacities and worsening lymphangitic spread of disease. Reports recent admission 12/3 for same at [facility] - had CT pleural effusion drainage > 1lit. She was discharged home on 12/13 on 2-4 lit NC oxygen. She had chest tube placement on 12/23 per thoracic sx service with 1lit drained. ECHO - showed moderate to severe pericardial effusion with cardiac tamponade physiology. this was discussed extensively with cardio, thoracic sx for possible pericardial window - but at this time, she is not deemed a good candidate for this.      Acute on Chronic Hypoxic Respiratory Failure likely due to Malignant Effusions and worsening lymphangitic spread of disease; rule out Atypical PNA  - ct step down unit  - s/p CT  -currently on Hi-José 45%   -CT angio negative for PE; bilateral pleural effusions (left > right), left sided opacities (atelectasis vs atypical PNA) and worsening lymphangitic spread of disease  -f/u blood cultures  - empiric abx - low suspicion for PNA, ID consulted for abx recs.   -BNP within normal range  -Bronchodilators and cough expectorant as needed  -anxiolytics and analgesia as needed  -incentive spirometry  -Pulm following   -monitor respiratory status closely, prognosis guarded  - Onc consult appreciated.     Metastatic, Infiltrating Ductal Carcinoma of the Right Breast  -with liver and bone mets (pathologic, compression fractures of C3 and T2/T7)  -underwent immunotherapy on 12/2 and developed a hypersensitivity reaction  -was scheduled for chemo on 12/27 with Dr. Chapa ([facility]). Chemo ordered by Hem/onc Herceptin and Docetaxel to be given once obtained.     Anxiety  -on Xanax at home  -will switch to IV ativan while inpatient  -continue escitalopram which was recently started    Back Pain due to Pathologic Compression Fractures  -known C3/T2 compression fractures  -CT angio with new T7 compression fracture with mild retropulsion   -Obtain dedicated imaging of T-spine  -MRIs reviewed from November 2021  -ortho spine consulted (Follows with Dr. Santana as outpatient)  -f/u  CT and MRI spine   -Continue Morphine ER 30 mg TID  -Add IV dilaudid for mod/severe pain as needed  -Continue muscle relaxer - on carisoprodol as needed  -Will consider pain management consult if pain is uncontrolled     Small Pericardial Effusion  -likely malignant  -TNI negative; BNP within normal range  -patient reports effusion was small on recent TTE at [facility] on 12/12 or 12/13  -patient was seen by [facility] cardio at that time  - TTE - here - moderate pericardial effusion - possible early cardiac tamponade physiology - not a suitable candidate for pericardial window placement at this time per thoracic Sx.      KURT  - encouraged hydration, Will hold off on IVFs for now to prevent reaccumulation of pleural effusion    DVT ppx Lovenox  Dispo: remains acute on IV abx and Chest tube.

## 2021-12-25 NOTE — PROGRESS NOTE ADULT - ASSESSMENT
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 Santana), Anxiety, and Muscle Spasms who presents to the ED with complaints of worsening SOB and found to have bilateral pleural effusions, left sided opacities and worsening lymphangitic spread of disease. Patient requiring BiPAP on arrival for oxygenation, currently switched to HFNC. CT spine also showing new acute T2 and T7 compression fractures, bony mets. Further imaging pending.  Thoracic surgery consulted for pleural effusion.  12/23 left pigtail placed for serous fluid.

## 2021-12-25 NOTE — PROGRESS NOTE ADULT - SUBJECTIVE AND OBJECTIVE BOX
Patient is a 36y old  Female who presents with a chief complaint of SOB (24 Dec 2021 12:20)      HPI:  Patient is a 36 year old female with recently diagnosed Metastatic Right Breast CA (Infiltrating Ductal Carcinoma with mets to the Spine and Liver), Malignant Pleural Effusion on Home O2 (3-5 liters), Pathologic Compression Fractures (Follows with Dr. Santana), Anxiety, and Muscle Spasms who presents to the ED with complaints of worsening SOB. Patient was recently admitted in [place] from 12/2-12/13 for SOB following her first dose of Perjeta.  She required a left sided chest tube.  Patient states she was discharged on home O2 and was doing relatively well until today when she acutely became more dyspneic. Pulse ox was 84% on 5 liters and patient reports feeling an overwhelming feeling of doom and chest pressure. Denies any recent fevers or chills, but does report a productive cough (intermittently yellow). CT angio was negative for PE, but revealed bilateral effusions (larger on the left), worsening lymphangitic spread and left sided opacities (Atelectasis vs PNA).  In the ED, patient was initially placed on bipap and seen by MICU who switched the patient to Hi-diana and recommended admission to step down unit. Patient seen and examined, reports SOB has improved since initial presentation but visibly appears anxious. Complaining of pleuritic right sided chest pain when coughing and chronic back pain for which she follows with Dr. Santana. Denies lightheadedness, dizziness, chest pain at this time, vomiting, abdominal pain or diarrhea. Otherwise, reports mild nausea and weakness.  (22 Dec 2021 23:55)     pt now has left chest tube in place with removal of 1L of serous fluid  pt more comfortable now; sitting in bed ; denies SOB or chest pain  no fevers.  on HiFlo O2 at 35%    REVIEW OF SYSTEMS:    CONSTITUTIONAL: No weakness, fevers or chills  EYES/ENT: No visual changes;  No vertigo or throat pain   NECK: No pain or stiffness  RESPIRATORY: left chest discomfort   CARDIOVASCULAR: No chest pain or palpitations  GASTROINTESTINAL: No abdominal or epigastric pain. No nausea, vomiting, or hematemesis; No diarrhea or constipation. No melena or hematochezia.  GENITOURINARY: No dysuria, frequency or hematuria  NEUROLOGICAL: No numbness or weakness  SKIN: No itching, burning, rashes, or lesions   All other review of systems is negative unless indicated above.    PAST MEDICAL & SURGICAL HISTORY:  Breast CA    H/O compression fracture of spine    Anxiety    S/P tonsillectomy        SOCIAL HISTORY:    FAMILY HISTORY:  FH: CVA (cerebrovascular accident)        MEDICATIONS  (STANDING):  enoxaparin Injectable 40 milliGRAM(s) SubCutaneous daily  escitalopram 5 milliGRAM(s) Oral daily  modafinil 100 milliGRAM(s) Oral daily  morphine ER Tablet 30 milliGRAM(s) Oral three times a day  piperacillin/tazobactam IVPB.. 3.375 Gram(s) IV Intermittent every 8 hours  vancomycin  IVPB 1000 milliGRAM(s) IV Intermittent every 12 hours    MEDICATIONS  (PRN):  acetaminophen     Tablet .. 650 milliGRAM(s) Oral every 6 hours PRN Temp greater or equal to 38C (100.4F), Mild Pain (1 - 3)  albuterol/ipratropium for Nebulization 3 milliLiter(s) Nebulizer every 6 hours PRN Shortness of Breath and/or Wheezing  Carisoprodol 250 milliGRAM(s) 250 milliGRAM(s) Oral four times a day PRN muscle spasm  guaiFENesin Oral Liquid (Sugar-Free) 200 milliGRAM(s) Oral every 6 hours PRN Cough  HYDROmorphone  Injectable 1 milliGRAM(s) IV Push every 4 hours PRN Severe Pain (7 - 10)  HYDROmorphone  Injectable 0.5 milliGRAM(s) IV Push every 4 hours PRN Moderate Pain (4 - 6)  LORazepam   Injectable 0.5 milliGRAM(s) IV Push three times a day PRN Anxiety  promethazine 12.5 milliGRAM(s) Oral two times a day PRN nausea      Allergies    Perjeta (Other (Severe))  pertuzumab (Other (Severe))    Intolerances        Vital Signs Last 24 Hrs  T(C): 36.8 (24 Dec 2021 10:00), Max: 37.1 (23 Dec 2021 16:03)  T(F): 98.3 (24 Dec 2021 10:00), Max: 98.8 (23 Dec 2021 16:03)  HR: 115 (24 Dec 2021 10:00) (84 - 116)  BP: 119/78 (24 Dec 2021 10:00) (103/70 - 138/77)  BP(mean): 81 (24 Dec 2021 00:00) (81 - 97)  RR: 20 (24 Dec 2021 10:00) (20 - 22)  SpO2: 93% (24 Dec 2021 10:00) (92% - 96%)    PHYSICAL EXAM  General: adult in NAD  HEENT: clear oropharynx, anicteric sclera, pink conjunctiva  Neck: supple  CV: normal S1/S2 with no murmur rubs or gallops  Lungs: left chest tube with serous fluid   Abdomen: soft non-tender non-distended, no hepatosplenomegaly  Ext: no clubbing cyanosis or edema  Skin: no rashes and no petechiae  Neuro: alert and oriented X 4, no focal deficits      LABS:     7.57  H/H 11.5/36.8 plt ct 484,000 (12/25/2021)                11.0   11.28 )-----------( 471      ( 24 Dec 2021 06:03 )             35.0         Mean Cell Volume : 87.5 fl  Mean Cell Hemoglobin : 27.5 pg  Mean Cell Hemoglobin Concentration : 31.4 gm/dL  Auto Neutrophil # : x  Auto Lymphocyte # : x  Auto Monocyte # : x  Auto Eosinophil # : x  Auto Basophil # : x  Auto Neutrophil % : x  Auto Lymphocyte % : x  Auto Monocyte % : x  Auto Eosinophil % : x  Auto Basophil % : x      Serial CBC's  12-24 @ 06:03  Hct-35.0 / Hgb-11.0 / Plat-471 / RBC-4.00 / WBC-11.28  Serial CBC's  12-23 @ 06:51  Hct-35.3 / Hgb-11.0 / Plat-486 / RBC-4.07 / WBC-7.92  Serial CBC's  12-22 @ 20:59  Hct-36.4 / Hgb-11.7 / Plat-470 / RBC-4.16 / WBC-9.43      12-24    134<L>  |  98  |  14.6  ----------------------------<  103<H>  3.5   |  22.0  |  0.81    Ca    8.9      24 Dec 2021 06:01  Phos  3.9     12-23  Mg     2.2     12-23        PT/INR - ( 22 Dec 2021 20:59 )   PT: 15.5 sec;   INR: 1.36 ratio         PTT - ( 22 Dec 2021 20:59 )  PTT:36.7 sec                BLOOD SMEAR INTERPRETATION:       RADIOLOGY & ADDITIONAL STUDIES:

## 2021-12-25 NOTE — PROGRESS NOTE ADULT - SUBJECTIVE AND OBJECTIVE BOX
[facility]                                   [address] [place] [address]                                                      PHONE: [phone]                                                         FAX: [phone]  [email or web address]  ---------------------------------------------------------------------------------------------------------------------------------    Overnight events/patient complaints: Feeling better, breathing improving.  No F/C.  Appears comfortable, sitting in bed      Perjeta (Other (Severe))  pertuzumab (Other (Severe))    MEDICATIONS  (STANDING):  enoxaparin Injectable 40 milliGRAM(s) SubCutaneous daily  escitalopram 5 milliGRAM(s) Oral daily  lidocaine   4% Patch 1 Patch Transdermal daily  methylPREDNISolone sodium succinate Injectable 60 milliGRAM(s) IV Push every 6 hours  modafinil 100 milliGRAM(s) Oral daily  morphine ER Tablet 30 milliGRAM(s) Oral three times a day  piperacillin/tazobactam IVPB.. 3.375 Gram(s) IV Intermittent every 8 hours  vancomycin  IVPB 750 milliGRAM(s) IV Intermittent every 12 hours    MEDICATIONS  (PRN):  acetaminophen     Tablet .. 650 milliGRAM(s) Oral every 6 hours PRN Temp greater or equal to 38C (100.4F), Mild Pain (1 - 3)  albuterol/ipratropium for Nebulization 3 milliLiter(s) Nebulizer every 6 hours PRN Shortness of Breath and/or Wheezing  Carisoprodol 250 milliGRAM(s) 250 milliGRAM(s) Oral four times a day PRN muscle spasm  guaiFENesin Oral Liquid (Sugar-Free) 200 milliGRAM(s) Oral every 6 hours PRN Cough  HYDROmorphone  Injectable 1 milliGRAM(s) IV Push every 4 hours PRN Severe Pain (7 - 10)  HYDROmorphone  Injectable 0.5 milliGRAM(s) IV Push every 4 hours PRN Moderate Pain (4 - 6)  LORazepam   Injectable 0.5 milliGRAM(s) IV Push three times a day PRN Anxiety  promethazine 12.5 milliGRAM(s) Oral two times a day PRN nausea      Vital Signs Last 24 Hrs  T(C): 36.9 (25 Dec 2021 06:45), Max: 37.1 (24 Dec 2021 15:47)  T(F): 98.5 (25 Dec 2021 06:45), Max: 98.8 (24 Dec 2021 15:47)  HR: 90 (25 Dec 2021 06:45) (81 - 115)  BP: 136/91 (25 Dec 2021 06:45) (114/77 - 143/85)  BP(mean): --  RR: 18 (25 Dec 2021 06:45) (18 - 20)  SpO2: 97% (25 Dec 2021 06:45) (93% - 98%)  ICU Vital Signs Last 24 Hrs  NATIVIDAD MYERS  I&O's Detail    24 Dec 2021 07:01  -  25 Dec 2021 07:00  --------------------------------------------------------  IN:    Oral Fluid: 1010 mL  Total IN: 1010 mL    OUT:    Chest Tube (mL): 330 mL    Voided (mL): 990 mL  Total OUT: 1320 mL    Total NET: -310 mL        I&O's Summary    24 Dec 2021 07:01  -  25 Dec 2021 07:00  --------------------------------------------------------  IN: 1010 mL / OUT: 1320 mL / NET: -310 mL      Drug Dosing Weight  NATIVIDAD MYERS      PHYSICAL EXAM:  General: Appears well developed, well nourished alert and cooperative.  HEENT: Head; normocephalic, atraumatic.  Eyes: Pupils reactive, cornea wnl.  Neck: Supple, no nodes adenopathy, no NVD or carotid bruit or thyromegaly.  CARDIOVASCULAR: Normal S1 and S2, No murmur, rub, gallop or lift.   LUNGS: scant insp/exp wheeze  ABDOMEN: Soft, nontender without mass or organomegaly. bowel sounds normoactive.  EXTREMITIES: No clubbing, cyanosis or edema. Distal pulses wnl.   SKIN: warm and dry with normal turgor.  NEURO: Alert/oriented x 3/normal motor exam. No pathologic reflexes.    PSYCH: normal affect.        LABS:                        11.5   7.37  )-----------( 484      ( 25 Dec 2021 07:37 )             36.5     12-25    139  |  101  |  17.8  ----------------------------<  144<H>  4.3   |  23.0  |  1.48<H>    Ca    8.7      25 Dec 2021 07:37      NATIVIDAD MYERS            RADIOLOGY & ADDITIONAL STUDIES:    INTERPRETATION OF TELEMETRY (personally reviewed): sinus rhythm      ASSESSMENT AND PLAN:  Breast cancer with lymphangitic spread  s/p pigtail drainage L pleural effusion, remains in place  Pericardial effusion  No obvious indication for pericardial drainage  Repeat echo today   Thoracic surgery following  To start chemo asap

## 2021-12-25 NOTE — CONSULT NOTE ADULT - SUBJECTIVE AND OBJECTIVE BOX
[facility]  INFECTIOUS DISEASES AND INTERNAL MEDICINE at [facility]  =======================================================  Warner Dale MD  Diplomates American Board of Internal Medicine and Infectious Diseases  Tel: [phone]      Fax: [phone] / [phone]  =======================================================      N-[id number]  NATIVIDAD MYERS    CC: Patient is a 36y old  Female who presents with a chief complaint of SOB (25 Dec 2021 10:45)      36y  Female with h/o Metastatic Right Breast CA (Infiltrating Ductal Carcinoma with mets to the Spine and Liver), Malignant Pleural Effusion on Home O2 (3-5 liters), Pathologic Compression Fractures (Follows with Dr. Santana), Anxiety, and Muscle Spasms. Patient presents to the ED with complaints of worsening SOB. Patient was recently admitted in [facility] from 12/12-12/13 for SOB and found to have a malignant left sided chest tube requiring a chest tube. Patient states she was discharged on home O2 and was doing relatively well until today when she acutely became more dyspneic. Pulse ox was 84% on 5 liters and patient reports feeling an overwhelming feeling of doom and chest pressure. Denies any recent fevers or chills. Her son was home with a viral illness prior to presentation. CT angio was negative for PE,but revealed bilateral effusions (larger on the left), worsening lymphangitic spread and left sided opacities (Atelectasis vs PNA).  In the ED, patient was initially placed on BiPAP and seen by MICU who switched the patient to Hi-diana and recommended admission to step down unit. Patient seen and examined, reports SOB has improved since initial presentation but visibly appears anxious. Complaining of pleuritic right sided chest pain when coughing and chronic back pain for which she follows with Dr. Santana. Denies lightheadedness, dizziness, chest pain at this time, vomiting, abdominal pain or diarrhea. Otherwise, reports mild nausea and weakness. Was started on Vancomycin and Zosyn. ID input requested.       Past Medical & Surgical Hx:  Breast CA  H/O compression fracture of spine  Anxiety  S/P tonsillectomy      Social Hx:  No h/o smoking       FAMILY HISTORY:  No Family history of breast cancer mother, siblings or aunts       Allergies  Perjeta (Other (Severe))  pertuzumab (Other (Severe))       REVIEW OF SYSTEMS:  CONSTITUTIONAL:  No Fever or chills  HEENT:  No diplopia or blurred vision.  No earache, sore throat or runny nose.  CARDIOVASCULAR:  No pressure, squeezing, strangling, tightness, heaviness or aching about the chest, neck, axilla or epigastrium.  RESPIRATORY:  No cough, + shortness of breath  GASTROINTESTINAL:  No nausea, vomiting or diarrhea.  GENITOURINARY:  No dysuria, frequency or urgency. No Blood in urine  MUSCULOSKELETAL:  no joint aches, no muscle pain  SKIN:  No change in skin, hair or nails.  NEUROLOGIC:  No Headaches, seizures  PSYCHIATRIC:  No disorder of thought or mood.  ENDOCRINE:  No heat or cold intolerance  HEMATOLOGICAL:  No easy bruising or bleeding.       Physical Exam:  GEN: NAD, pleasant  HEENT: normocephalic and atraumatic. EOMI. PERRL.  Anicteric  NECK: Supple.   LUNGS: Course BS B/L   HEART: Regular rate and rhythm   ABDOMEN: Soft, nontender, and nondistended.  Positive bowel sounds.    : No CVA tenderness  EXTREMITIES: Without any edema.  MSK: No joint swelling  NEUROLOGIC: No Focal Deficits  PSYCHIATRIC: Appropriate affect .  SKIN: No Rash      Vitals:  T(F): 98.5 (25 Dec 2021 06:45), Max: 98.8 (24 Dec 2021 15:47)  HR: 90 (25 Dec 2021 06:45)  BP: 136/91 (25 Dec 2021 06:45)  RR: 18 (25 Dec 2021 06:45)  SpO2: 97% (25 Dec 2021 06:45) (94% - 98%)  temp max in last 48H T(F): , Max: 98.8 (12-23-21 @ 16:03)    Current Antibiotics:  piperacillin/tazobactam IVPB.. 3.375 Gram(s) IV Intermittent every 8 hours  vancomycin  IVPB 750 milliGRAM(s) IV Intermittent every 12 hours    Other medications:  enoxaparin Injectable 40 milliGRAM(s) SubCutaneous daily  escitalopram 5 milliGRAM(s) Oral daily  lidocaine   4% Patch 1 Patch Transdermal daily  methylPREDNISolone sodium succinate Injectable 60 milliGRAM(s) IV Push every 6 hours  modafinil 100 milliGRAM(s) Oral daily  morphine ER Tablet 30 milliGRAM(s) Oral three times a day                 11.5   7.37  )-----------( 484      ( 25 Dec 2021 07:37 )             36.5     12-25    139  |  101  |  17.8  ----------------------------<  144<H>  4.3   |  23.0  |  1.48<H>    Ca    8.7      25 Dec 2021 07:37      RECENT CULTURES:  12-24 @ 00:25 .Body Fluid Pleural Fluid     polymorphonuclear leukocytes seen  No organisms seen  by cytocentrifuge    12-23 @ 01:01 .Blood Blood-Peripheral     No growth at 48 hours    12-23 @ 01:00 .Blood Blood-Peripheral     No growth at 48 hours      WBC Count: 7.37 K/uL (12-25-21 @ 07:37)  WBC Count: 11.28 K/uL (12-24-21 @ 06:03)  WBC Count: 7.92 K/uL (12-23-21 @ 06:51)  WBC Count: 9.43 K/uL (12-22-21 @ 20:59)    Creatinine, Serum: 1.48 mg/dL (12-25-21 @ 07:37)  Creatinine, Serum: 0.81 mg/dL (12-24-21 @ 06:01)  Creatinine, Serum: 0.42 mg/dL (12-23-21 @ 06:51)  Creatinine, Serum: 0.40 mg/dL (12-22-21 @ 20:59)    Procalcitonin, Serum: 0.09 ng/mL (12-23-21 @ 01:02)           < from: Xray Chest 1 View- PORTABLE-Routine (Xray Chest 1 View- PORTABLE-Routine in AM.) (12.24.21 @ 05:16) >  ACC: [id number] EXAM:  XR CHEST PORTABLE URGENT 1V                        ACC: [id number] EXAM:  XR CHEST PORTABLE ROUTINE 1V                          PROCEDURE DATE:  12/24/2021      INTERPRETATION:  Clinical Information: Chest tube placement.    Technique: 2 AP chest x-ray(s).    Comparison: None    Findings/  Impression: There is a right chest wall Mediport and a left chest tube.   No pleural effusion or pneumothorax. Moderate to severe multifocal patchy   opacification of the lungs is worsened compared to prior.  --- End of Report ---  < end of copied text >

## 2021-12-25 NOTE — PROGRESS NOTE ADULT - PROBLEM SELECTOR PLAN 1
12/22 CT Angio chest showing stable small-moderate Left pleural effusion and small right pleural effusion with extensive lymphangitis metastases within the lungs, with atelectasis, pulm .  Now s/p left pigtail 12/23  Maintain left pigtaill to suction per Dr. Sow.  Plan likely for left pleurx catheter prior to discharge.  Repeat TTE ordered 12/25 or 12/26 to evaluate pericardial effusion now that pleural effusion has been drained.  recommend consulting ID to evaluate need for abx   Plan of care Discussed with Dr. Sow.

## 2021-12-25 NOTE — CONSULT NOTE ADULT - ASSESSMENT
36y  Female with h/o Metastatic Right Breast CA (Infiltrating Ductal Carcinoma with mets to the Spine and Liver), Malignant Pleural Effusion on Home O2 (3-5 liters), Pathologic Compression Fractures (Follows with Dr. Santana), Anxiety, and Muscle Spasms. Patient presents to the ED with complaints of worsening SOB. Patient was recently admitted in [facility] from 12/12-12/13 for SOB and found to have a malignant left sided chest tube requiring a chest tube. Patient states she was discharged on home O2 and was doing relatively well until today when she acutely became more dyspneic. Pulse ox was 84% on 5 liters and patient reports feeling an overwhelming feeling of doom and chest pressure. Denies any recent fevers or chills. Her son was home with a viral illness prior to presentation. CT angio was negative for PE,but revealed bilateral effusions (larger on the left), worsening lymphangitic spread and left sided opacities (Atelectasis vs PNA).  In the ED, patient was initially placed on BiPAP and seen by MICU who switched the patient to Hi-diana and recommended admission to step down unit. Patient seen and examined, reports SOB has improved since initial presentation but visibly appears anxious. Complaining of pleuritic right sided chest pain when coughing and chronic back pain for which she follows with Dr. Santana. Denies lightheadedness, dizziness, chest pain at this time, vomiting, abdominal pain or diarrhea. Otherwise, reports mild nausea and weakness. Was started on Vancomycin and Zosyn.      Acute hypoxic respiratory failure   Malignant Pleural Effusion  ? PNA on CT  Metastatic Right Breast CA  Immunosuppressed due to chemotherapy      - Blood cultures no growth   - Repeat blood cultures if febrile   - RVP/COVID 19 PCR negative   - CXR with left chest tube  - CT Chest reporting b/l effusions   - Procalcitonin level 0.09  - Continue Zosyn  - D/C Vancomycin   - Follow up cultures  - Trend Fever  - Trend WBC      Thank you for allowing me to participate in the care of your patient.   Will Follow    d/w Dr Cooper

## 2021-12-25 NOTE — PROGRESS NOTE ADULT - ATTENDING COMMENTS
Patient seen and examined. Remains on 40-35% FiO2 high flow nasal cannula. Repeat ECHO shows no changes with moderate pericardial effusion with fibrinous material within the pericardial well.   Will ask interventional cardiology if there is a window, to see if there is any chance this can be dealt without intubation/general anesthesia.   Patient understands that she may not be able to get extubated once she is intubated.     Cont empiric abx and steroids for bilateral lung infiltrates (? metastatic disease versus infection)     Feels better after drainage of pleural fluid.

## 2021-12-25 NOTE — PROGRESS NOTE ADULT - SUBJECTIVE AND OBJECTIVE BOX
[facility] Division of Hospital Medicine    Chief Complaint: Shortness of breath.      SUBJECTIVE / OVERNIGHT EVENTS: Patient seen and examined. CT in place. Reports some pain at insertion site but controlled.  Reports minimal cough. She states her breathing is improved. On high flow 35L, 40%.     Patient denies  abd pain, N/V, fever, chills, dysuria or any other complaints. All remainder ROS negative.     MEDICATIONS  (STANDING):  enoxaparin Injectable 40 milliGRAM(s) SubCutaneous daily  escitalopram 5 milliGRAM(s) Oral daily  lidocaine   4% Patch 1 Patch Transdermal daily  methylPREDNISolone sodium succinate Injectable 60 milliGRAM(s) IV Push every 6 hours  modafinil 100 milliGRAM(s) Oral daily  morphine ER Tablet 30 milliGRAM(s) Oral three times a day  piperacillin/tazobactam IVPB.. 3.375 Gram(s) IV Intermittent every 8 hours  vancomycin  IVPB 750 milliGRAM(s) IV Intermittent every 12 hours    MEDICATIONS  (PRN):  acetaminophen     Tablet .. 650 milliGRAM(s) Oral every 6 hours PRN Temp greater or equal to 38C (100.4F), Mild Pain (1 - 3)  albuterol/ipratropium for Nebulization 3 milliLiter(s) Nebulizer every 6 hours PRN Shortness of Breath and/or Wheezing  Carisoprodol 250 milliGRAM(s) 250 milliGRAM(s) Oral four times a day PRN muscle spasm  guaiFENesin Oral Liquid (Sugar-Free) 200 milliGRAM(s) Oral every 6 hours PRN Cough  HYDROmorphone  Injectable 1 milliGRAM(s) IV Push every 4 hours PRN Severe Pain (7 - 10)  HYDROmorphone  Injectable 0.5 milliGRAM(s) IV Push every 4 hours PRN Moderate Pain (4 - 6)  LORazepam   Injectable 0.5 milliGRAM(s) IV Push three times a day PRN Anxiety  promethazine 12.5 milliGRAM(s) Oral two times a day PRN nausea        I&O's Summary    24 Dec 2021 07:01  -  25 Dec 2021 07:00  --------------------------------------------------------  IN: 1010 mL / OUT: 1320 mL / NET: -310 mL        PHYSICAL EXAM:  Vital Signs Last 24 Hrs  T(C): 36.9 (25 Dec 2021 06:45), Max: 37.1 (24 Dec 2021 15:47)  T(F): 98.5 (25 Dec 2021 06:45), Max: 98.8 (24 Dec 2021 15:47)  HR: 90 (25 Dec 2021 06:45) (81 - 111)  BP: 136/91 (25 Dec 2021 06:45) (114/77 - 143/85)  BP(mean): --  RR: 18 (25 Dec 2021 06:45) (18 - 20)  SpO2: 97% (25 Dec 2021 06:45) (94% - 98%)        CONSTITUTIONAL: NAD,   ENMT: Moist oral mucosa, no pharyngeal injection or exudates; normal dentition  RESPIRATORY: Normal respiratory effort; on high flow. left CT. Decreased breath sounds in the left lung base.   CARDIOVASCULAR: Regular rate and rhythm, normal S1 and S2, ; No lower extremity edema;  ABDOMEN: Nontender to palpation, normoactive bowel sounds, no rebound/guarding; No hepatosplenomegaly  MUSCLOSKELETAL: no clubbing or cyanosis of digits; no joint swelling or tenderness to palpation  PSYCH: A+O to person, place, and time; affect appropriate  NEUROLOGY: CN 2-12 are intact and symmetric; no gross sensory deficits;   SKIN: No rashes; no palpable lesions    LABS:                        11.5   7.37  )-----------( 484      ( 25 Dec 2021 07:37 )             36.5     12-25    139  |  101  |  17.8  ----------------------------<  144<H>  4.3   |  23.0  |  1.48<H>    Ca    8.7      25 Dec 2021 07:37                Culture - Body Fluid with Gram Stain (collected 24 Dec 2021 00:25)  Source: .Body Fluid Pleural Fluid  Gram Stain (24 Dec 2021 02:39):    polymorphonuclear leukocytes seen    No organisms seen    by cytocentrifuge    Culture - Blood (collected 23 Dec 2021 01:01)  Source: .Blood Blood-Peripheral  Preliminary Report (25 Dec 2021 03:00):    No growth at 48 hours    Culture - Blood (collected 23 Dec 2021 01:00)  Source: .Blood Blood-Peripheral  Preliminary Report (25 Dec 2021 03:00):    No growth at 48 hours      CAPILLARY BLOOD GLUCOSE            RADIOLOGY & ADDITIONAL TESTS:  Results Reviewed:   Imaging Personally Reviewed:  Electrocardiogram Personally Reviewed:

## 2021-12-25 NOTE — PROGRESS NOTE ADULT - SUBJECTIVE AND OBJECTIVE BOX
Pt Name: NATIVIDAD MYERS    MRN: [id number]        Patient has h/o metastatic breast CA with bony mets, follows with Dr Santana for her compression fractures. Pt had CT scans of cervical thoracic and lumbar spine done 11/3/2021 with lumbar and thoracic MRI as well. Pt reports that she was supposed to get repeat imaging done outpatient this month to evaluate stability of compression fractures. Patient ambulates without assistance, denies any new onset weaknesses. Pt states she has back pain "all the time" and that it is no worse than usual. Pt denies any new trauma, falls, motor sensory changes, bladder or bowel dysfunction.         PAST MEDICAL & SURGICAL HISTORY:  PAST MEDICAL & SURGICAL HISTORY:  Breast CA    H/O compression fracture of spine    Anxiety    S/P tonsillectomy        Allergies: Perjeta (Other (Severe))  pertuzumab (Other (Severe))      Medications: acetaminophen     Tablet .. 650 milliGRAM(s) Oral every 6 hours PRN  albuterol/ipratropium for Nebulization 3 milliLiter(s) Nebulizer every 6 hours PRN  Carisoprodol 250 milliGRAM(s) 250 milliGRAM(s) Oral four times a day PRN  enoxaparin Injectable 40 milliGRAM(s) SubCutaneous daily  escitalopram 5 milliGRAM(s) Oral daily  guaiFENesin Oral Liquid (Sugar-Free) 200 milliGRAM(s) Oral every 6 hours PRN  HYDROmorphone  Injectable 1 milliGRAM(s) IV Push every 4 hours PRN  HYDROmorphone  Injectable 0.5 milliGRAM(s) IV Push every 4 hours PRN  lidocaine   4% Patch 1 Patch Transdermal daily  LORazepam   Injectable 0.5 milliGRAM(s) IV Push three times a day PRN  methylPREDNISolone sodium succinate Injectable 60 milliGRAM(s) IV Push every 6 hours  modafinil 100 milliGRAM(s) Oral daily  morphine ER Tablet 30 milliGRAM(s) Oral three times a day  piperacillin/tazobactam IVPB.. 3.375 Gram(s) IV Intermittent every 8 hours  promethazine 12.5 milliGRAM(s) Oral two times a day PRN  vancomycin  IVPB 1000 milliGRAM(s) IV Intermittent every 12 hours        Ambulation: Walking independently                         11.0   11.28 )-----------( 471      ( 24 Dec 2021 06:03 )             35.0     12-24    134<L>  |  98  |  14.6  ----------------------------<  103<H>  3.5   |  22.0  |  0.81    Ca    8.9      24 Dec 2021 06:01        PHYSICAL EXAM:    Vital Signs Last 24 Hrs  T(C): 36.9 (25 Dec 2021 06:45), Max: 37.1 (24 Dec 2021 15:47)  T(F): 98.5 (25 Dec 2021 06:45), Max: 98.8 (24 Dec 2021 15:47)  HR: 90 (25 Dec 2021 06:45) (81 - 115)  BP: 136/91 (25 Dec 2021 06:45) (109/69 - 143/85)  BP(mean): --  RR: 18 (25 Dec 2021 06:45) (18 - 20)  SpO2: 97% (25 Dec 2021 06:45) (93% - 98%)  Daily     Daily Weight in k.4 (25 Dec 2021 04:00)    Appearance: Alert, responsive, in no acute distress.    Neurological: Sensation is grossly intact to light touch. 5/5 motor function of all extremities. No focal deficits or weaknesses found.  pathological reflexes : normal  Skin: no rash on visible skin. Skin is clean, dry and intact. No bleeding. No abrasions. No ulcerations.    Vascular: 2+ distal pulses. Cap refill < 2 sec. No signs of venous   insufficiency   or stasis. No extremity ulcerations. No cyanosis.    Musculoskeletal:        Exam remains unchanged from previously   < from: CT Thoracic Spine Reform No Cont (. @ 01:32) >  ACC: [id number] EXAM:  CT REFORM SPINE T                          PROCEDURE DATE:  2021          INTERPRETATION:  CLINICAL HISTORY:  Metastatic breast cancer. T2 and T7   compression fractures on CTA.    TECHNIQUE: CT of the thoracic spine which are reformatted from CT   pulmonary angiogram.  Examination consists of cone down transaxial thin axial images of the   thoracic spine which are reformatted from a CT of chest with contrast.   Coronal and sagittal reformatted images were created from the transaxial   source data.  Images were reviewed in bone and soft tissue windows.    COMPARISON: PET scan 2021 and chest CT 11/3/2021    FINDINGS:  Diffuse osseous metastatic disease throughout the spine. There is   actually increased vertebral body height loss at T2 with the compression   fracture deformity being present on the prior exam. Leaking of the   superior endplate of T7 is new from the prior PET scan. There is   increased vertebral body height loss and wedging of the superior  endplates of T8-T10. Vertebral body height loss with widening of the   inferior endplate of T12 and superior and inferior endplates of L1 appear   unchanged.    There is no evidence of paraspinal soft tissue swelling or for epidural   hematoma.    Retropulsion of bone into the epidural space at T2 contributes to mild   canal stenosis. Limited assessment for extraosseous epidural tumor extent   on this modality.    The paraspinal musculature is unremarkable.    IMPRESSION:  Diffuse osseous metastatic disease.  Pathological compression fractures at T2 and T8-T10 are associated with   increased vertebral body height loss. Pathological fracture at T7 with   mild wedging of the superior endplate is new.    --- End of Report ---            SULEMAN ARRINGTON MD; Attending Radiologist  This document has been electronically signed. Dec 23 2021  2:01AM    < end of copied text >    A/P:  Pt is a  36y Female with Metastatic breast cancer with spine lesions.   * PAIN CONTROL  MRI of cervical, thoracic and lumbar spine when able.

## 2021-12-25 NOTE — PROGRESS NOTE ADULT - PROBLEM SELECTOR PLAN 2
Moderate pericardial effusion on TTE yesterday.  Small on CT scan 12/22.  No plan for pericardial window at this time.  Will repeat TTE  12/25 or 12/26 to re-evaluate, now that pleural effusion has been drained.

## 2021-12-26 LAB
ANION GAP SERPL CALC-SCNC: 13 MMOL/L — SIGNIFICANT CHANGE UP (ref 5–17)
BUN SERPL-MCNC: 21.5 MG/DL — HIGH (ref 8–20)
CALCIUM SERPL-MCNC: 8.2 MG/DL — LOW (ref 8.6–10.2)
CHLORIDE SERPL-SCNC: 102 MMOL/L — SIGNIFICANT CHANGE UP (ref 98–107)
CO2 SERPL-SCNC: 23 MMOL/L — SIGNIFICANT CHANGE UP (ref 22–29)
CREAT SERPL-MCNC: 1.28 MG/DL — SIGNIFICANT CHANGE UP (ref 0.5–1.3)
GLUCOSE SERPL-MCNC: 142 MG/DL — HIGH (ref 70–99)
HCT VFR BLD CALC: 34 % — LOW (ref 34.5–45)
HGB BLD-MCNC: 11 G/DL — LOW (ref 11.5–15.5)
MAGNESIUM SERPL-MCNC: 2.6 MG/DL — SIGNIFICANT CHANGE UP (ref 1.6–2.6)
MCHC RBC-ENTMCNC: 27.6 PG — SIGNIFICANT CHANGE UP (ref 27–34)
MCHC RBC-ENTMCNC: 32.4 GM/DL — SIGNIFICANT CHANGE UP (ref 32–36)
MCV RBC AUTO: 85.4 FL — SIGNIFICANT CHANGE UP (ref 80–100)
PLATELET # BLD AUTO: 493 K/UL — HIGH (ref 150–400)
POTASSIUM SERPL-MCNC: 4.3 MMOL/L — SIGNIFICANT CHANGE UP (ref 3.5–5.3)
POTASSIUM SERPL-SCNC: 4.3 MMOL/L — SIGNIFICANT CHANGE UP (ref 3.5–5.3)
RBC # BLD: 3.98 M/UL — SIGNIFICANT CHANGE UP (ref 3.8–5.2)
RBC # FLD: 14.4 % — SIGNIFICANT CHANGE UP (ref 10.3–14.5)
SODIUM SERPL-SCNC: 137 MMOL/L — SIGNIFICANT CHANGE UP (ref 135–145)
WBC # BLD: 10.87 K/UL — HIGH (ref 3.8–10.5)
WBC # FLD AUTO: 10.87 K/UL — HIGH (ref 3.8–10.5)

## 2021-12-26 PROCEDURE — 99233 SBSQ HOSP IP/OBS HIGH 50: CPT

## 2021-12-26 PROCEDURE — 99231 SBSQ HOSP IP/OBS SF/LOW 25: CPT

## 2021-12-26 RX ORDER — SENNA PLUS 8.6 MG/1
2 TABLET ORAL AT BEDTIME
Refills: 0 | Status: DISCONTINUED | OUTPATIENT
Start: 2021-12-26 | End: 2022-01-03

## 2021-12-26 RX ORDER — POLYETHYLENE GLYCOL 3350 17 G/17G
17 POWDER, FOR SOLUTION ORAL DAILY
Refills: 0 | Status: DISCONTINUED | OUTPATIENT
Start: 2021-12-26 | End: 2022-01-03

## 2021-12-26 RX ADMIN — HYDROMORPHONE HYDROCHLORIDE 1 MILLIGRAM(S): 2 INJECTION INTRAMUSCULAR; INTRAVENOUS; SUBCUTANEOUS at 16:55

## 2021-12-26 RX ADMIN — Medication 0.5 MILLIGRAM(S): at 23:53

## 2021-12-26 RX ADMIN — Medication 60 MILLIGRAM(S): at 00:45

## 2021-12-26 RX ADMIN — Medication 60 MILLIGRAM(S): at 11:37

## 2021-12-26 RX ADMIN — SENNA PLUS 2 TABLET(S): 8.6 TABLET ORAL at 22:02

## 2021-12-26 RX ADMIN — Medication 0.5 MILLIGRAM(S): at 20:09

## 2021-12-26 RX ADMIN — LIDOCAINE 1 PATCH: 4 CREAM TOPICAL at 11:38

## 2021-12-26 RX ADMIN — MORPHINE SULFATE 30 MILLIGRAM(S): 50 CAPSULE, EXTENDED RELEASE ORAL at 22:02

## 2021-12-26 RX ADMIN — POLYETHYLENE GLYCOL 3350 17 GRAM(S): 17 POWDER, FOR SOLUTION ORAL at 11:37

## 2021-12-26 RX ADMIN — PIPERACILLIN AND TAZOBACTAM 25 GRAM(S): 4; .5 INJECTION, POWDER, LYOPHILIZED, FOR SOLUTION INTRAVENOUS at 22:00

## 2021-12-26 RX ADMIN — Medication 60 MILLIGRAM(S): at 17:33

## 2021-12-26 RX ADMIN — MORPHINE SULFATE 30 MILLIGRAM(S): 50 CAPSULE, EXTENDED RELEASE ORAL at 23:00

## 2021-12-26 RX ADMIN — HYDROMORPHONE HYDROCHLORIDE 1 MILLIGRAM(S): 2 INJECTION INTRAMUSCULAR; INTRAVENOUS; SUBCUTANEOUS at 20:09

## 2021-12-26 RX ADMIN — PIPERACILLIN AND TAZOBACTAM 25 GRAM(S): 4; .5 INJECTION, POWDER, LYOPHILIZED, FOR SOLUTION INTRAVENOUS at 05:37

## 2021-12-26 RX ADMIN — HYDROMORPHONE HYDROCHLORIDE 1 MILLIGRAM(S): 2 INJECTION INTRAMUSCULAR; INTRAVENOUS; SUBCUTANEOUS at 08:11

## 2021-12-26 RX ADMIN — PIPERACILLIN AND TAZOBACTAM 25 GRAM(S): 4; .5 INJECTION, POWDER, LYOPHILIZED, FOR SOLUTION INTRAVENOUS at 14:04

## 2021-12-26 RX ADMIN — MORPHINE SULFATE 30 MILLIGRAM(S): 50 CAPSULE, EXTENDED RELEASE ORAL at 05:36

## 2021-12-26 RX ADMIN — HYDROMORPHONE HYDROCHLORIDE 1 MILLIGRAM(S): 2 INJECTION INTRAMUSCULAR; INTRAVENOUS; SUBCUTANEOUS at 20:30

## 2021-12-26 RX ADMIN — Medication 60 MILLIGRAM(S): at 05:36

## 2021-12-26 RX ADMIN — Medication 60 MILLIGRAM(S): at 23:58

## 2021-12-26 RX ADMIN — HYDROMORPHONE HYDROCHLORIDE 1 MILLIGRAM(S): 2 INJECTION INTRAMUSCULAR; INTRAVENOUS; SUBCUTANEOUS at 08:26

## 2021-12-26 RX ADMIN — MORPHINE SULFATE 30 MILLIGRAM(S): 50 CAPSULE, EXTENDED RELEASE ORAL at 14:33

## 2021-12-26 RX ADMIN — MORPHINE SULFATE 30 MILLIGRAM(S): 50 CAPSULE, EXTENDED RELEASE ORAL at 14:03

## 2021-12-26 RX ADMIN — HYDROMORPHONE HYDROCHLORIDE 1 MILLIGRAM(S): 2 INJECTION INTRAMUSCULAR; INTRAVENOUS; SUBCUTANEOUS at 17:10

## 2021-12-26 RX ADMIN — ENOXAPARIN SODIUM 40 MILLIGRAM(S): 100 INJECTION SUBCUTANEOUS at 11:37

## 2021-12-26 RX ADMIN — LIDOCAINE 1 PATCH: 4 CREAM TOPICAL at 19:08

## 2021-12-26 RX ADMIN — MODAFINIL 100 MILLIGRAM(S): 200 TABLET ORAL at 11:37

## 2021-12-26 RX ADMIN — MORPHINE SULFATE 30 MILLIGRAM(S): 50 CAPSULE, EXTENDED RELEASE ORAL at 06:36

## 2021-12-26 RX ADMIN — ESCITALOPRAM OXALATE 5 MILLIGRAM(S): 10 TABLET, FILM COATED ORAL at 11:38

## 2021-12-26 RX ADMIN — LIDOCAINE 1 PATCH: 4 CREAM TOPICAL at 23:48

## 2021-12-26 NOTE — PROGRESS NOTE ADULT - SUBJECTIVE AND OBJECTIVE BOX
[facility]                                   [address] [place] [address]                                                      PHONE: [phone]                                                         FAX: [phone]  [email or web address]  ---------------------------------------------------------------------------------------------------------------------------------    Overnight events/patient complaints: Feeling better.  No CP or dizziness.  Breathing better.  Repeat echo- moderate pericardial effusion, mild right heart impingement,  no change from previous study      Perjeta (Other (Severe))  pertuzumab (Other (Severe))    MEDICATIONS  (STANDING):  enoxaparin Injectable 40 milliGRAM(s) SubCutaneous daily  escitalopram 5 milliGRAM(s) Oral daily  lidocaine   4% Patch 1 Patch Transdermal daily  methylPREDNISolone sodium succinate Injectable 60 milliGRAM(s) IV Push every 6 hours  modafinil 100 milliGRAM(s) Oral daily  morphine ER Tablet 30 milliGRAM(s) Oral three times a day  piperacillin/tazobactam IVPB.. 3.375 Gram(s) IV Intermittent every 8 hours    MEDICATIONS  (PRN):  acetaminophen     Tablet .. 650 milliGRAM(s) Oral every 6 hours PRN Temp greater or equal to 38C (100.4F), Mild Pain (1 - 3)  albuterol/ipratropium for Nebulization 3 milliLiter(s) Nebulizer every 6 hours PRN Shortness of Breath and/or Wheezing  Carisoprodol 250 milliGRAM(s) 250 milliGRAM(s) Oral four times a day PRN muscle spasm  guaiFENesin Oral Liquid (Sugar-Free) 200 milliGRAM(s) Oral every 6 hours PRN Cough  HYDROmorphone  Injectable 1 milliGRAM(s) IV Push every 4 hours PRN Severe Pain (7 - 10)  HYDROmorphone  Injectable 0.5 milliGRAM(s) IV Push every 4 hours PRN Moderate Pain (4 - 6)  LORazepam   Injectable 0.5 milliGRAM(s) IV Push three times a day PRN Anxiety  promethazine 12.5 milliGRAM(s) Oral two times a day PRN nausea      Vital Signs Last 24 Hrs  T(C): 36.9 (26 Dec 2021 08:00), Max: 37.2 (25 Dec 2021 12:00)  T(F): 98.4 (26 Dec 2021 08:00), Max: 98.9 (25 Dec 2021 12:00)  HR: 78 (26 Dec 2021 08:00) (60 - 98)  BP: 120/74 (26 Dec 2021 08:00) (120/74 - 150/81)  BP(mean): --  RR: 20 (26 Dec 2021 08:00) (19 - 20)  SpO2: 94% (26 Dec 2021 08:00) (93% - 98%)  ICU Vital Signs Last 24 Hrs  NATIVIDAD MYERS  I&O's Detail    25 Dec 2021 07:01  -  26 Dec 2021 07:00  --------------------------------------------------------  IN:    IV PiggyBack: 200 mL    Oral Fluid: 1960 mL  Total IN: 2160 mL    OUT:    Chest Tube (mL): 180 mL    Voided (mL): 1930 mL  Total OUT: 2110 mL    Total NET: 50 mL        I&O's Summary    25 Dec 2021 07:01  -  26 Dec 2021 07:00  --------------------------------------------------------  IN: 2160 mL / OUT: 2110 mL / NET: 50 mL      Drug Dosing Weight  NATIVIDAD MYERS      PHYSICAL EXAM:  General: Appears well developed, well nourished alert and cooperative.  HEENT: Head; normocephalic, atraumatic.  Eyes: Pupils reactive, cornea wnl.  Neck: Supple, no nodes adenopathy, no NVD or carotid bruit or thyromegaly.  CARDIOVASCULAR: Normal S1 and S2, No murmur, rub, gallop or lift.   LUNGS: No rales, rhonchi or wheeze. Normal breath sounds bilaterally.  ABDOMEN: Soft, nontender without mass or organomegaly. bowel sounds normoactive.  EXTREMITIES: No clubbing, cyanosis or edema. Distal pulses wnl.   SKIN: warm and dry with normal turgor.  NEURO: Alert/oriented x 3/normal motor exam. No pathologic reflexes.    PSYCH: normal affect.        LABS:                        11.0   10.87 )-----------( 493      ( 26 Dec 2021 06:06 )             34.0     12-26    137  |  102  |  21.5<H>  ----------------------------<  142<H>  4.3   |  23.0  |  1.28    Ca    8.2<L>      26 Dec 2021 06:11  Mg     2.6     12-26    TPro  6.9  /  Alb  x   /  TBili  x   /  DBili  x   /  AST  x   /  ALT  x   /  AlkPhos  x   12-25    NATIIVDAD MYERS            RADIOLOGY & ADDITIONAL STUDIES:    INTERPRETATION OF TELEMETRY (personally reviewed): sinus rhythm      ASSESSMENT AND PLAN:  Metastatic breast ca  s/p L pleural pigtail drain  Malignant pericardial effusion, moderate, unchanged from previous echo this admission  Would likely benefit from pericardiocentesis, will discuss with interventional (wants to avoid general anesthesia).  Not emergent.  Thoracic surgery following  Needs chemo, patient wants to start tomorrow

## 2021-12-26 NOTE — PROGRESS NOTE ADULT - PROBLEM SELECTOR PLAN 1
- 12/22 CT Angio chest showing stable small-moderate Left pleural effusion and small right pleural effusion with extensive lymphangitis metastases within the lungs, with atelectasis, pulm .  - Now s/p left pigtail 12/23  - Maintain left pigtaill to suction per Dr. Sow.  - Plan likely for left pleurx catheter prior to discharge.  - maintaining sat >90% on 14L 35% HFNC  - Repeat TTE x2 showing moderate pericardial effusion, unchanged over 2 days; borderline tamponade physiology.  - recommend consulting ID to evaluate need for abx   - Plan of care Discussed with Dr. Sow.

## 2021-12-26 NOTE — PROGRESS NOTE ADULT - ASSESSMENT
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 Santana), Anxiety, and Muscle Spasms who presents to the ED with complaints of worsening SOB.    CT angio was negative for PE, but revealed bilateral effusions (larger on the left), worsening lymphangitic spread and left sided opacities (Atelectasis vs PNA).    pt s/p chest tube with drainage of 1 L of serous fluid   pt more comfortable now; sitting in bed   no fevers.  on HiFlo O2 at 35%    Metastatic Her 2 + breast ca   - poor tolerance to Perjeta   - rapidly progressive disease with malignant pleural effusion and lymphangitic spread with resp compromise   - d/w her primary Oncologist Dr Chapa; will plan to start chemo with Taxotere + Herceptin asap.  d/w nurse manager and pharmacy.  Chemo drugs unavailable at this time.  May need to wait till Monday.  Pharmacy to attempt to borrow from another facility.    - chemo orders written and in chart. consent obtained   - close pulm f/u .  d/w Pulm .  will give trial of high dose steroids.  Solumedrol 60mg IV Q6hrs   -potential IR-guided drainage of pericardial effusion; if this is to proceed early in week, would defer chemptherapy until done; otherwise, proceed as planned    Malignant Pleural / pericardial effusion   - s/p Left chest tube   - TTE to eval pericardial effusion   - CT sx following ; possible IR-guided drainage    Joseph Rivas MD  [facility]  [phone]

## 2021-12-26 NOTE — PROGRESS NOTE ADULT - ASSESSMENT
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 Santana), Anxiety, and Muscle Spasms who presents to the ED with complaints of worsening SOB and found to have bilateral pleural effusions, left sided opacities and worsening lymphangitic spread of disease. Reports recent admission 12/3 for same at [facility] - had CT pleural effusion drainage > 1lit. She was discharged home on 12/13 on 2-4 lit NC oxygen. She had chest tube placement on 12/23 per thoracic sx service with 1lit drained. ECHO - showed moderate to severe pericardial effusion with cardiac tamponade physiology. this was discussed extensively with cardio, thoracic sx for possible pericardial window - but at this time, she is not deemed a good candidate for this.      Acute on Chronic Hypoxic Respiratory Failure likely due to Malignant exudative Effusions and worsening lymphangitic spread of disease; rule out Atypical PNA  by lights criteria effusion is Exudative   - ct step down unit  - s/p CT  -currently on Hi-José 45%   -CT angio negative for PE; bilateral pleural effusions (left > right), left sided opacities (atelectasis vs atypical PNA) and worsening lymphangitic spread of disease  -blood cultures negative  - empiric abx - low suspicion for PNA, ID consulted for abx recs. Continue Zosyn for now.   -Bronchodilators and cough expectorant as needed  -anxiolytics and analgesia as needed  -incentive spirometry  -Pulm following   -monitor respiratory status closely, prognosis guarded  - Onc consult appreciated.     Metastatic, Infiltrating Ductal Carcinoma of the Right Breast  -with liver and bone mets (pathologic, compression fractures of C3 and T2/T7)  -underwent immunotherapy on 12/2 and developed a hypersensitivity reaction  -was scheduled for chemo on 12/27 with Dr. Chapa ([facility]). Chemo ordered by Hem/onc Herceptin and Docetaxel to be given once obtained.     Anxiety  -on Xanax at home  -will switch to IV ativan while inpatient  -continue escitalopram which was recently started    Back Pain due to Pathologic Compression Fractures  -known C3/T2 compression fractures  -CT angio with new T7 compression fracture with mild retropulsion   -Obtain dedicated imaging of T-spine  -MRIs reviewed from November 2021  -ortho spine consulted (Follows with Dr. Santana as outpatient)  -f/u  CT and MRI spine   -Continue Morphine ER 30 mg TID  -Add IV dilaudid for mod/severe pain as needed  -Continue muscle relaxer - on carisoprodol as needed  -Will consider pain management consult if pain is uncontrolled     Small Pericardial Effusion  -likely malignant  -TNI negative; BNP within normal range  -patient reports effusion was small on recent TTE at [facility] on 12/12 or 12/13  -patient was seen by [facility] cardio at that time  - TTE - here - moderate pericardial effusion - possible early cardiac tamponade physiology - not a suitable candidate for pericardial window placement at this time per thoracic Sx.      KURT, improved  - encouraged hydration, Will hold off on IVFs for now to prevent reaccumulation of pleural effusion    Constipation  - senna and miralax    DVT ppx Lovenox  Dispo: remains acute on IV abx and Chest tube.

## 2021-12-26 NOTE — PROGRESS NOTE ADULT - SUBJECTIVE AND OBJECTIVE BOX
Subjective: Patient requesting ensure shakes and additional bowel regimen for constipation. Patient complaints of runny nose d/t humidification from HFNC. Otherwise patient denies CP, palpitations, SOB, cough, fever, chills, itchiness/rash, diaphoresis, vision changes, HA, dizziness/lightheadedness, numbness/tingling, abd pain, N/V.     Pertinent events of the past 24 hours: Uneventful, recovering as expected    VITAL SIGNS  Vital Signs Last 24 Hrs  T(C): 36.9 (12-26-21 @ 08:00), Max: 37.1 (12-25-21 @ 16:00)  T(F): 98.4 (12-26-21 @ 08:00), Max: 98.7 (12-25-21 @ 16:00)  HR: 78 (12-26-21 @ 08:00) (62 - 98)  BP: 120/74 (12-26-21 @ 08:00) (120/74 - 150/81)  RR: 20 (12-26-21 @ 08:00) (19 - 20)  SpO2: 94% (12-26-21 @ 08:00) (93% - 94%)  on (O2)              Telemetry/Alarms:  NSR 70's    MEDICATIONS  acetaminophen     Tablet .. 650 milliGRAM(s) Oral every 6 hours PRN  albuterol/ipratropium for Nebulization 3 milliLiter(s) Nebulizer every 6 hours PRN  Carisoprodol 250 milliGRAM(s) 250 milliGRAM(s) Oral four times a day PRN  enoxaparin Injectable 40 milliGRAM(s) SubCutaneous daily  escitalopram 5 milliGRAM(s) Oral daily  guaiFENesin Oral Liquid (Sugar-Free) 200 milliGRAM(s) Oral every 6 hours PRN  HYDROmorphone  Injectable 0.5 milliGRAM(s) IV Push every 4 hours PRN  HYDROmorphone  Injectable 1 milliGRAM(s) IV Push every 4 hours PRN  lidocaine   4% Patch 1 Patch Transdermal daily  LORazepam   Injectable 0.5 milliGRAM(s) IV Push three times a day PRN  methylPREDNISolone sodium succinate Injectable 60 milliGRAM(s) IV Push every 6 hours  modafinil 100 milliGRAM(s) Oral daily  morphine ER Tablet 30 milliGRAM(s) Oral three times a day  piperacillin/tazobactam IVPB.. 3.375 Gram(s) IV Intermittent every 8 hours  polyethylene glycol 3350 17 Gram(s) Oral daily  promethazine 12.5 milliGRAM(s) Oral two times a day PRN  senna 2 Tablet(s) Oral at bedtime      PHYSICAL EXAM  Constitutional: NAD  Neuro: A+O x 3, non-focal, speech clear and intact  CV: regular rate, regular rhythm, +S1S2, no murmurs or rub  Pulm/chest: rhonchi, no accessory muscle use noted  Abd: +BS, soft, NT, ND  Ext: DENNEY x 4, no edema   Skin: warm, well perfused  Psych: calm, appropriate affect  Drains: Left pigtail chest tube to suction     12-25 @ 07:01  -  12-26 @ 07:00  --------------------------------------------------------  IN: 2160 mL / OUT: 2110 mL / NET: 50 mL    12-26 @ 07:01  -  12-26 @ 12:46  --------------------------------------------------------  IN: 0 mL / OUT: 130 mL / NET: -130 mL    Weights:  Daily     Daily   Admit Wt: Drug Dosing Weight  Height (cm): 154.9 (22 Dec 2021 19:40)  Weight (kg): 76.2 (22 Dec 2021 19:40)  BMI (kg/m2): 31.8 (22 Dec 2021 19:40)  BSA (m2): 1.75 (22 Dec 2021 19:40)    All laboratory results, radiology and medications reviewed.    LABS  12-26    137  |  102  |  21.5<H>  ----------------------------<  142<H>  4.3   |  23.0  |  1.28    Ca    8.2<L>      26 Dec 2021 06:11  Mg     2.6     12-26    TPro  6.9  /  Alb  x   /  TBili  x   /  DBili  x   /  AST  x   /  ALT  x   /  AlkPhos  x   12-25                                 11.0   10.87 )-----------( 493      ( 26 Dec 2021 06:06 )             34.0               Last CXR: < from: Xray Chest 1 View- PORTABLE-Routine (Xray Chest 1 View- PORTABLE-Routine in AM.) (12.25.21 @ 04:30) >  COMPARISON: No previous examinations are available for review.    FINDINGS: Mediport catheter tip in SVC.  LEFT multi-sidehole pigtail catheter overlies LEFT lower hemithorax.  Unchanged bilateral  multifocal and diffuse ill-defined airspace   opacities..   No pneumothorax.    Heart size difficult to assess due to adjacent opacified parenchyma.    Visualized osseous structures are intact.    IMPRESSION:    Persistent bilateral  multifocal and diffuse ill-defined airspace   opacities..   LEFT multi-sidehole pigtail chest tube catheter in place.    < from: TTE Echo Complete w/ Contrast w/ Doppler (12.25.21 @ 10:33) >  PHYSICIAN INTERPRETATION:  Left Ventricle: The left ventricular internal cavity size is normal.  Global LV systolic function was normal.  Pericardium: A moderately sized pericardial effusion is present. The   pericardial effusion is globally located around the entire heart and   anterior to the right ventricle. The pericardial effusion appears to   contain fibrous material. There is inversion of the right atrial wall and   inversion of the right ventricular wall. *there are signs of borderline   tamponade physiology including early systolic right atrial inversion and   tricuspid inflow variation.  Venous: The inferior vena cava was normal sized, with respiratory size   variation greater than 50%.  In comparison to the previous echocardiogram(s): Prior examinations are   available and were reviewed for comparison purposes. The pericardial   effusion is unchanged. Unchanged from prior echo.      Summary:   1. Normal global left ventricular systolic function.   2. Moderate pericardial effusion.   3. *there are signs of borderline tamponade physiology including early   systolic right atrial inversion and tricuspid inflow variation.   4. Unchanged from prior echo.    PAST MEDICAL & SURGICAL HISTORY:  Breast CA    H/O compression fracture of spine    Anxiety    S/P tonsillectomy

## 2021-12-26 NOTE — PROGRESS NOTE ADULT - SUBJECTIVE AND OBJECTIVE BOX
Patient is a 36y old  Female who presents with a chief complaint of SOB (24 Dec 2021 12:20)      HPI:  Patient is a 36 year old female with recently diagnosed Metastatic Right Breast CA (Infiltrating Ductal Carcinoma with mets to the Spine and Liver), Malignant Pleural Effusion on Home O2 (3-5 liters), Pathologic Compression Fractures (Follows with Dr. Santana), Anxiety, and Muscle Spasms who presents to the ED with complaints of worsening SOB. Patient was recently admitted in [place] from 12/2-12/13 for SOB following her first dose of Perjeta.  She required a left sided chest tube.  Patient states she was discharged on home O2 and was doing relatively well until today when she acutely became more dyspneic. Pulse ox was 84% on 5 liters and patient reports feeling an overwhelming feeling of doom and chest pressure. Denies any recent fevers or chills, but does report a productive cough (intermittently yellow). CT angio was negative for PE, but revealed bilateral effusions (larger on the left), worsening lymphangitic spread and left sided opacities (Atelectasis vs PNA).  In the ED, patient was initially placed on bipap and seen by MICU who switched the patient to Hi-diana and recommended admission to step down unit. Patient seen and examined, reports SOB has improved since initial presentation but visibly appears anxious. Complaining of pleuritic right sided chest pain when coughing and chronic back pain for which she follows with Dr. Santana. Denies lightheadedness, dizziness, chest pain at this time, vomiting, abdominal pain or diarrhea. Otherwise, reports mild nausea and weakness.  (22 Dec 2021 23:55)     pt now has left chest tube in place with removal of 1L of serous fluid  pt more comfortable now; sitting in bed ; denies SOB or chest pain  no fevers.  on HiFlo [id number] at  at 35%    REVIEW OF SYSTEMS:    CONSTITUTIONAL: No weakness, fevers or chills  EYES/ENT: No visual changes;  No vertigo or throat pain   NECK: No pain or stiffness  RESPIRATORY: left chest discomfort   CARDIOVASCULAR: No chest pain or palpitations  GASTROINTESTINAL: No abdominal or epigastric pain. No nausea, vomiting, or hematemesis; No diarrhea or constipation. No melena or hematochezia.  GENITOURINARY: No dysuria, frequency or hematuria  NEUROLOGICAL: No numbness or weakness  SKIN: No itching, burning, rashes, or lesions   All other review of systems is negative unless indicated above.    PAST MEDICAL & SURGICAL HISTORY:  Breast CA    H/O compression fracture of spine    Anxiety    S/P tonsillectomy        SOCIAL HISTORY:    FAMILY HISTORY:  FH: CVA (cerebrovascular accident)        MEDICATIONS  (STANDING):  enoxaparin Injectable 40 milliGRAM(s) SubCutaneous daily  escitalopram 5 milliGRAM(s) Oral daily  modafinil 100 milliGRAM(s) Oral daily  morphine ER Tablet 30 milliGRAM(s) Oral three times a day  piperacillin/tazobactam IVPB.. 3.375 Gram(s) IV Intermittent every 8 hours  vancomycin  IVPB 1000 milliGRAM(s) IV Intermittent every 12 hours    MEDICATIONS  (PRN):  acetaminophen     Tablet .. 650 milliGRAM(s) Oral every 6 hours PRN Temp greater or equal to 38C (100.4F), Mild Pain (1 - 3)  albuterol/ipratropium for Nebulization 3 milliLiter(s) Nebulizer every 6 hours PRN Shortness of Breath and/or Wheezing  Carisoprodol 250 milliGRAM(s) 250 milliGRAM(s) Oral four times a day PRN muscle spasm  guaiFENesin Oral Liquid (Sugar-Free) 200 milliGRAM(s) Oral every 6 hours PRN Cough  HYDROmorphone  Injectable 1 milliGRAM(s) IV Push every 4 hours PRN Severe Pain (7 - 10)  HYDROmorphone  Injectable 0.5 milliGRAM(s) IV Push every 4 hours PRN Moderate Pain (4 - 6)  LORazepam   Injectable 0.5 milliGRAM(s) IV Push three times a day PRN Anxiety  promethazine 12.5 milliGRAM(s) Oral two times a day PRN nausea      Allergies    Perjeta (Other (Severe))  pertuzumab (Other (Severe))    Intolerances        Vital Signs Last 24 Hrs  T(C): 36.8 (24 Dec 2021 10:00), Max: 37.1 (23 Dec 2021 16:03)  T(F): 98.3 (24 Dec 2021 10:00), Max: 98.8 (23 Dec 2021 16:03)  HR: 115 (24 Dec 2021 10:00) (84 - 116)  BP: 119/78 (24 Dec 2021 10:00) (103/70 - 138/77)  BP(mean): 81 (24 Dec 2021 00:00) (81 - 97)  RR: 20 (24 Dec 2021 10:00) (20 - 22)  SpO2: 93% (24 Dec 2021 10:00) (92% - 96%)    PHYSICAL EXAM  General: adult in NAD  HEENT: clear oropharynx, anicteric sclera, pink conjunctiva  Neck: supple  CV: normal S1/S2 with no murmur rubs or gallops  Lungs: left chest tube with serous fluid   Abdomen: soft non-tender non-distended, no hepatosplenomegaly  Ext: no clubbing cyanosis or edema  Skin: no rashes and no petechiae  Neuro: alert and oriented X 4, no focal deficits      LABS:   10.57  H/H 11/34  plt ct 493  (12/26/2021)   7.57  H/H 11.5/36.8 plt ct 484,000 (12/25/2021)                11.0   11.28 )-----------( 471      ( 24 Dec 2021 06:03 )             35.0         Mean Cell Volume : 87.5 fl  Mean Cell Hemoglobin : 27.5 pg  Mean Cell Hemoglobin Concentration : 31.4 gm/dL  Auto Neutrophil # : x  Auto Lymphocyte # : x  Auto Monocyte # : x  Auto Eosinophil # : x  Auto Basophil # : x  Auto Neutrophil % : x  Auto Lymphocyte % : x  Auto Monocyte % : x  Auto Eosinophil % : x  Auto Basophil % : x      Serial CBC's  12-24 @ 06:03  Hct-35.0 / Hgb-11.0 / Plat-471 / RBC-4.00 / WBC-11.28  Serial CBC's  12-23 @ 06:51  Hct-35.3 / Hgb-11.0 / Plat-486 / RBC-4.07 / WBC-7.92  Serial CBC's  12-22 @ 20:59  Hct-36.4 / Hgb-11.7 / Plat-470 / RBC-4.16 / WBC-9.43      12-24    134<L>  |  98  |  14.6  ----------------------------<  103<H>  3.5   |  22.0  |  0.81    Ca    8.9      24 Dec 2021 06:01  Phos  3.9     12-23  Mg     2.2     12-23        PT/INR - ( 22 Dec 2021 20:59 )   PT: 15.5 sec;   INR: 1.36 ratio         PTT - ( 22 Dec 2021 20:59 )  PTT:36.7 sec                BLOOD SMEAR INTERPRETATION:       RADIOLOGY & ADDITIONAL STUDIES:

## 2021-12-26 NOTE — PROGRESS NOTE ADULT - PROBLEM SELECTOR PLAN 2
- Moderate pericardial effusion on TTE 12/25.  - Small on CT scan 12/22.  - Dr. Sow to speak with Interventional Cardiology/IR for possible drainage 12/27  - Plan of care Discussed with Dr. Sow.

## 2021-12-26 NOTE — PROGRESS NOTE ADULT - SUBJECTIVE AND OBJECTIVE BOX
[facility] Division of Hospital Medicine    Chief Complaint: Shortness of breath    SUBJECTIVE / OVERNIGHT EVENTS: Patient seen and examined. Reports breathing is better. No BM in 3 days. Reports pain is improved.     Patient denies chest pain, SOB, abd pain, N/V, fever, chills, dysuria or any other complaints. All remainder ROS negative.     MEDICATIONS  (STANDING):  enoxaparin Injectable 40 milliGRAM(s) SubCutaneous daily  escitalopram 5 milliGRAM(s) Oral daily  lidocaine   4% Patch 1 Patch Transdermal daily  methylPREDNISolone sodium succinate Injectable 60 milliGRAM(s) IV Push every 6 hours  modafinil 100 milliGRAM(s) Oral daily  morphine ER Tablet 30 milliGRAM(s) Oral three times a day  piperacillin/tazobactam IVPB.. 3.375 Gram(s) IV Intermittent every 8 hours  polyethylene glycol 3350 17 Gram(s) Oral daily  senna 2 Tablet(s) Oral at bedtime    MEDICATIONS  (PRN):  acetaminophen     Tablet .. 650 milliGRAM(s) Oral every 6 hours PRN Temp greater or equal to 38C (100.4F), Mild Pain (1 - 3)  albuterol/ipratropium for Nebulization 3 milliLiter(s) Nebulizer every 6 hours PRN Shortness of Breath and/or Wheezing  Carisoprodol 250 milliGRAM(s) 250 milliGRAM(s) Oral four times a day PRN muscle spasm  guaiFENesin Oral Liquid (Sugar-Free) 200 milliGRAM(s) Oral every 6 hours PRN Cough  HYDROmorphone  Injectable 0.5 milliGRAM(s) IV Push every 4 hours PRN Moderate Pain (4 - 6)  HYDROmorphone  Injectable 1 milliGRAM(s) IV Push every 4 hours PRN Severe Pain (7 - 10)  LORazepam   Injectable 0.5 milliGRAM(s) IV Push three times a day PRN Anxiety  promethazine 12.5 milliGRAM(s) Oral two times a day PRN nausea        I&O's Summary    25 Dec 2021 07:01  -  26 Dec 2021 07:00  --------------------------------------------------------  IN: 2160 mL / OUT: 2110 mL / NET: 50 mL    26 Dec 2021 07:01  -  26 Dec 2021 15:19  --------------------------------------------------------  IN: 0 mL / OUT: 130 mL / NET: -130 mL        PHYSICAL EXAM:  Vital Signs Last 24 Hrs  T(C): 36.9 (26 Dec 2021 08:00), Max: 37.1 (25 Dec 2021 16:00)  T(F): 98.4 (26 Dec 2021 08:00), Max: 98.7 (25 Dec 2021 16:00)  HR: 77 (26 Dec 2021 12:00) (62 - 98)  BP: 123/78 (26 Dec 2021 12:00) (117/69 - 150/81)  BP(mean): --  RR: 20 (26 Dec 2021 12:00) (19 - 20)  SpO2: 94% (26 Dec 2021 12:00) (92% - 94%)      CONSTITUTIONAL: NAD,   ENMT: Moist oral mucosa, no pharyngeal injection or exudates; normal dentition  RESPIRATORY: Normal respiratory effort; on high flow. left CT. Decreased breath sounds in the left lung base.   CARDIOVASCULAR: Regular rate and rhythm, normal S1 and S2, ; No lower extremity edema;  ABDOMEN: Nontender to palpation, normoactive bowel sounds, no rebound/guarding; No hepatosplenomegaly  MUSCLOSKELETAL: no clubbing or cyanosis of digits; no joint swelling or tenderness to palpation  PSYCH: A+O to person, place, and time; affect appropriate  NEUROLOGY: CN 2-12 are intact and symmetric; no gross sensory deficits;   SKIN: No rashes; no palpable lesions    LABS:                        11.0   10.87 )-----------( 493      ( 26 Dec 2021 06:06 )             34.0     12-26    137  |  102  |  21.5<H>  ----------------------------<  142<H>  4.3   |  23.0  |  1.28    Ca    8.2<L>      26 Dec 2021 06:11  Mg     2.6     12-26    TPro  6.9  /  Alb  x   /  TBili  x   /  DBili  x   /  AST  x   /  ALT  x   /  AlkPhos  x   12-25              Culture - Body Fluid with Gram Stain (collected 24 Dec 2021 00:25)  Source: .Body Fluid Pleural Fluid  Gram Stain (24 Dec 2021 02:39):    polymorphonuclear leukocytes seen    No organisms seen    by cytocentrifuge      CAPILLARY BLOOD GLUCOSE            RADIOLOGY & ADDITIONAL TESTS:  Results Reviewed:   Imaging Personally Reviewed:  Electrocardiogram Personally Reviewed:

## 2021-12-27 DIAGNOSIS — C50.919 MALIGNANT NEOPLASM OF UNSPECIFIED SITE OF UNSPECIFIED FEMALE BREAST: ICD-10-CM

## 2021-12-27 LAB
ALBUMIN SERPL ELPH-MCNC: 3 G/DL — LOW (ref 3.3–5.2)
ALP SERPL-CCNC: 390 U/L — HIGH (ref 40–120)
ALT FLD-CCNC: 24 U/L — SIGNIFICANT CHANGE UP
ANION GAP SERPL CALC-SCNC: 12 MMOL/L — SIGNIFICANT CHANGE UP (ref 5–17)
AST SERPL-CCNC: 27 U/L — SIGNIFICANT CHANGE UP
BILIRUB SERPL-MCNC: 0.4 MG/DL — SIGNIFICANT CHANGE UP (ref 0.4–2)
BUN SERPL-MCNC: 30.7 MG/DL — HIGH (ref 8–20)
CALCIUM SERPL-MCNC: 8.7 MG/DL — SIGNIFICANT CHANGE UP (ref 8.6–10.2)
CHLORIDE SERPL-SCNC: 101 MMOL/L — SIGNIFICANT CHANGE UP (ref 98–107)
CO2 SERPL-SCNC: 24 MMOL/L — SIGNIFICANT CHANGE UP (ref 22–29)
CREAT SERPL-MCNC: 1.26 MG/DL — SIGNIFICANT CHANGE UP (ref 0.5–1.3)
GLUCOSE SERPL-MCNC: 159 MG/DL — HIGH (ref 70–99)
HCT VFR BLD CALC: 37.6 % — SIGNIFICANT CHANGE UP (ref 34.5–45)
HGB BLD-MCNC: 11.8 G/DL — SIGNIFICANT CHANGE UP (ref 11.5–15.5)
MAGNESIUM SERPL-MCNC: 2.6 MG/DL — SIGNIFICANT CHANGE UP (ref 1.6–2.6)
MCHC RBC-ENTMCNC: 27.4 PG — SIGNIFICANT CHANGE UP (ref 27–34)
MCHC RBC-ENTMCNC: 31.4 GM/DL — LOW (ref 32–36)
MCV RBC AUTO: 87.2 FL — SIGNIFICANT CHANGE UP (ref 80–100)
PLATELET # BLD AUTO: 518 K/UL — HIGH (ref 150–400)
POTASSIUM SERPL-MCNC: 4.8 MMOL/L — SIGNIFICANT CHANGE UP (ref 3.5–5.3)
POTASSIUM SERPL-SCNC: 4.8 MMOL/L — SIGNIFICANT CHANGE UP (ref 3.5–5.3)
PROT SERPL-MCNC: 6.2 G/DL — LOW (ref 6.6–8.7)
RBC # BLD: 4.31 M/UL — SIGNIFICANT CHANGE UP (ref 3.8–5.2)
RBC # FLD: 14.1 % — SIGNIFICANT CHANGE UP (ref 10.3–14.5)
SARS-COV-2 RNA SPEC QL NAA+PROBE: SIGNIFICANT CHANGE UP
SODIUM SERPL-SCNC: 137 MMOL/L — SIGNIFICANT CHANGE UP (ref 135–145)
WBC # BLD: 11.52 K/UL — HIGH (ref 3.8–10.5)
WBC # FLD AUTO: 11.52 K/UL — HIGH (ref 3.8–10.5)

## 2021-12-27 PROCEDURE — 99233 SBSQ HOSP IP/OBS HIGH 50: CPT

## 2021-12-27 PROCEDURE — 99232 SBSQ HOSP IP/OBS MODERATE 35: CPT

## 2021-12-27 PROCEDURE — 99231 SBSQ HOSP IP/OBS SF/LOW 25: CPT

## 2021-12-27 RX ORDER — DOCETAXEL 20 MG/ML
105 INJECTION, SOLUTION, CONCENTRATE INTRAVENOUS ONCE
Refills: 0 | Status: COMPLETED | OUTPATIENT
Start: 2021-12-27 | End: 2021-12-27

## 2021-12-27 RX ORDER — DIPHENHYDRAMINE HCL 50 MG
25 CAPSULE ORAL ONCE
Refills: 0 | Status: COMPLETED | OUTPATIENT
Start: 2021-12-27 | End: 2021-12-27

## 2021-12-27 RX ORDER — TRASTUZUMAB-DKST 420 MG/20ML
456 INJECTION, POWDER, LYOPHILIZED, FOR SOLUTION INTRAVENOUS ONCE
Refills: 0 | Status: COMPLETED | OUTPATIENT
Start: 2021-12-27 | End: 2021-12-27

## 2021-12-27 RX ORDER — ACETAMINOPHEN 500 MG
650 TABLET ORAL ONCE
Refills: 0 | Status: COMPLETED | OUTPATIENT
Start: 2021-12-27 | End: 2021-12-27

## 2021-12-27 RX ORDER — DIPHENHYDRAMINE HCL 50 MG
25 CAPSULE ORAL ONCE
Refills: 0 | Status: DISCONTINUED | OUTPATIENT
Start: 2021-12-27 | End: 2021-12-27

## 2021-12-27 RX ADMIN — HYDROMORPHONE HYDROCHLORIDE 1 MILLIGRAM(S): 2 INJECTION INTRAMUSCULAR; INTRAVENOUS; SUBCUTANEOUS at 02:37

## 2021-12-27 RX ADMIN — SENNA PLUS 2 TABLET(S): 8.6 TABLET ORAL at 23:00

## 2021-12-27 RX ADMIN — Medication 0.5 MILLIGRAM(S): at 20:28

## 2021-12-27 RX ADMIN — Medication 25 MILLIGRAM(S): at 15:04

## 2021-12-27 RX ADMIN — ESCITALOPRAM OXALATE 5 MILLIGRAM(S): 10 TABLET, FILM COATED ORAL at 11:08

## 2021-12-27 RX ADMIN — LIDOCAINE 1 PATCH: 4 CREAM TOPICAL at 11:09

## 2021-12-27 RX ADMIN — Medication 60 MILLIGRAM(S): at 11:08

## 2021-12-27 RX ADMIN — MORPHINE SULFATE 30 MILLIGRAM(S): 50 CAPSULE, EXTENDED RELEASE ORAL at 07:00

## 2021-12-27 RX ADMIN — MODAFINIL 100 MILLIGRAM(S): 200 TABLET ORAL at 11:08

## 2021-12-27 RX ADMIN — DOCETAXEL 255.25 MILLIGRAM(S): 20 INJECTION, SOLUTION, CONCENTRATE INTRAVENOUS at 15:44

## 2021-12-27 RX ADMIN — MORPHINE SULFATE 30 MILLIGRAM(S): 50 CAPSULE, EXTENDED RELEASE ORAL at 23:00

## 2021-12-27 RX ADMIN — Medication 60 MILLIGRAM(S): at 06:01

## 2021-12-27 RX ADMIN — Medication 60 MILLIGRAM(S): at 23:01

## 2021-12-27 RX ADMIN — HYDROMORPHONE HYDROCHLORIDE 1 MILLIGRAM(S): 2 INJECTION INTRAMUSCULAR; INTRAVENOUS; SUBCUTANEOUS at 12:00

## 2021-12-27 RX ADMIN — TRASTUZUMAB-DKST 271.71 MILLIGRAM(S): 420 INJECTION, POWDER, LYOPHILIZED, FOR SOLUTION INTRAVENOUS at 16:50

## 2021-12-27 RX ADMIN — Medication 0.5 MILLIGRAM(S): at 18:25

## 2021-12-27 RX ADMIN — Medication 60 MILLIGRAM(S): at 18:28

## 2021-12-27 RX ADMIN — ENOXAPARIN SODIUM 40 MILLIGRAM(S): 100 INJECTION SUBCUTANEOUS at 11:07

## 2021-12-27 RX ADMIN — HYDROMORPHONE HYDROCHLORIDE 1 MILLIGRAM(S): 2 INJECTION INTRAMUSCULAR; INTRAVENOUS; SUBCUTANEOUS at 20:31

## 2021-12-27 RX ADMIN — Medication 102 MILLIGRAM(S): at 15:05

## 2021-12-27 RX ADMIN — PIPERACILLIN AND TAZOBACTAM 25 GRAM(S): 4; .5 INJECTION, POWDER, LYOPHILIZED, FOR SOLUTION INTRAVENOUS at 06:01

## 2021-12-27 RX ADMIN — Medication 650 MILLIGRAM(S): at 15:04

## 2021-12-27 RX ADMIN — LIDOCAINE 1 PATCH: 4 CREAM TOPICAL at 19:22

## 2021-12-27 RX ADMIN — LIDOCAINE 1 PATCH: 4 CREAM TOPICAL at 23:02

## 2021-12-27 RX ADMIN — HYDROMORPHONE HYDROCHLORIDE 1 MILLIGRAM(S): 2 INJECTION INTRAMUSCULAR; INTRAVENOUS; SUBCUTANEOUS at 02:20

## 2021-12-27 RX ADMIN — POLYETHYLENE GLYCOL 3350 17 GRAM(S): 17 POWDER, FOR SOLUTION ORAL at 11:09

## 2021-12-27 RX ADMIN — MORPHINE SULFATE 30 MILLIGRAM(S): 50 CAPSULE, EXTENDED RELEASE ORAL at 15:04

## 2021-12-27 RX ADMIN — HYDROMORPHONE HYDROCHLORIDE 1 MILLIGRAM(S): 2 INJECTION INTRAMUSCULAR; INTRAVENOUS; SUBCUTANEOUS at 20:15

## 2021-12-27 RX ADMIN — HYDROMORPHONE HYDROCHLORIDE 1 MILLIGRAM(S): 2 INJECTION INTRAMUSCULAR; INTRAVENOUS; SUBCUTANEOUS at 11:08

## 2021-12-27 RX ADMIN — MORPHINE SULFATE 30 MILLIGRAM(S): 50 CAPSULE, EXTENDED RELEASE ORAL at 06:01

## 2021-12-27 RX ADMIN — Medication 650 MILLIGRAM(S): at 16:58

## 2021-12-27 NOTE — PHARMACOTHERAPY INTERVENTION NOTE - COMMENTS
75mg/m2 dose lowered by 20% based on Alk Phos 390
Vancomycin trough ordered
pt dose adjusted based on increase in SCr

## 2021-12-27 NOTE — PROGRESS NOTE ADULT - SUBJECTIVE AND OBJECTIVE BOX
PULMONARY PROGRESS NOTE      NATIVIDAD MYERSCARLOS-[id number]    Patient is a 36y old  Female who presents with a chief complaint of SOB (27 Dec 2021 13:02)  Metastatic Breast Ca with lymphangitic spread and hypoxia     INTERVAL HPI/OVERNIGHT EVENTS:  Slightly better on steroid - marginal - await chemo - off ABx         MEDICATIONS  (STANDING):  dexAMETHasone  IVPB 10 milliGRAM(s) IV Intermittent once  DOCEtaxel IVPB (eMAR) 105 milliGRAM(s) IV Intermittent once  enoxaparin Injectable 40 milliGRAM(s) SubCutaneous daily  escitalopram 5 milliGRAM(s) Oral daily  methylPREDNISolone sodium succinate Injectable 60 milliGRAM(s) IV Push every 6 hours  modafinil 100 milliGRAM(s) Oral daily  morphine ER Tablet 30 milliGRAM(s) Oral three times a day  polyethylene glycol 3350 17 Gram(s) Oral daily  senna 2 Tablet(s) Oral at bedtime  trastuzumab (HERCEPTIN) IVPB (eMAR) 456 milliGRAM(s) IV Intermittent once      MEDICATIONS  (PRN):  acetaminophen     Tablet .. 650 milliGRAM(s) Oral every 6 hours PRN Temp greater or equal to 38C (100.4F), Mild Pain (1 - 3)  albuterol/ipratropium for Nebulization 3 milliLiter(s) Nebulizer every 6 hours PRN Shortness of Breath and/or Wheezing  Carisoprodol 250 milliGRAM(s) 250 milliGRAM(s) Oral four times a day PRN muscle spasm  guaiFENesin Oral Liquid (Sugar-Free) 200 milliGRAM(s) Oral every 6 hours PRN Cough  HYDROmorphone  Injectable 0.5 milliGRAM(s) IV Push every 4 hours PRN Moderate Pain (4 - 6)  HYDROmorphone  Injectable 1 milliGRAM(s) IV Push every 4 hours PRN Severe Pain (7 - 10)  LORazepam   Injectable 0.5 milliGRAM(s) IV Push three times a day PRN Anxiety  promethazine 12.5 milliGRAM(s) Oral two times a day PRN nausea      Allergies    Perjeta (Other (Severe))  pertuzumab (Other (Severe))    Intolerances        PAST MEDICAL & SURGICAL HISTORY:  Breast CA    H/O compression fracture of spine    Anxiety    S/P tonsillectomy        SOCIAL HISTORY  Smoking History:       REVIEW OF SYSTEMS:      Patient denies chest pain, SOB, abd pain, N/V, fever, chills, dysuria or any other complaints. All remainder ROS negative.       Vital Signs Last 24 Hrs  T(C): 36.7 (27 Dec 2021 08:00), Max: 37.2 (26 Dec 2021 16:50)  T(F): 98.1 (27 Dec 2021 08:00), Max: 98.9 (26 Dec 2021 16:50)  HR: 98 (27 Dec 2021 08:00) (76 - 98)  BP: 116/76 (27 Dec 2021 08:00) (116/76 - 141/80)  BP(mean): --  RR: 20 (27 Dec 2021 08:00) (19 - 20)  SpO2: 91% (27 Dec 2021 08:00) (91% - 95%)    PHYSICAL EXAMINATION:    GENERAL: The patient is awake and alert .         LABS:                        11.8   11.52 )-----------( 518      ( 27 Dec 2021 05:40 )             37.6     12-27    137  |  101  |  30.7<H>  ----------------------------<  159<H>  4.8   |  24.0  |  1.26    Ca    8.7      27 Dec 2021 05:40  Mg     2.6     12-27    TPro  6.2<L>  /  Alb  3.0<L>  /  TBili  0.4  /  DBili  x   /  AST  27  /  ALT  24  /  AlkPhos  390<H>  12-27        MICROBIOLOGY:  Non-contrib    RADIOLOGY & ADDITIONAL STUDIES:    CT Chest on 12/22/21  IMPRESSION:  Limited due to respiratory motion. No gross central, lobar or segmental   pulmonary embolism.    Stable small to moderate left and mildly increased small right pleural   effusions. Interval worsening of lymphangitic spread of disease within   the lungs. Superimposed pulmonary edema and/or atypical infection not   excluded. New left upper and lower lobe areas of airspace consolidation   could be due to atelectasis and/or pneumonia.    Mildly increased small pericardial effusion.    Osseous metastatic disease with new pathologic compression fracture   deformities at T2 and T7. Mild retropulsion of bone at T2.    SULEMAN ARRINGTON MD; Attending Radiologist  This document has been electronically signed. Dec 22 2021  9:17PM      CXR on 12/25/21  IMPRESSION:    Persistent bilateral  multifocal and diffuse ill-defined airspace   opacities..   LEFT multi-sidehole pigtail chest tube catheter in place..    SERA GARVEY MD; Attending Radiologist  This document has been electronically signed. Dec 26 2021 10:47AM      Echo on 12/27/21   1. Left ventricular ejection fraction, by visual estimation, is 60 to   65%.   2. Technically adequate study.   3. Normal global left ventricular systolic function.   4. Moderate pericardial effusion, similar in size to prior echos 1.8cm   at largest measurement. The is moderate suspicion for tamponade   physiology which shows signs of interval progression as the patient now   has respirophasic mitral/tricuspid variation, RA/RV early diastolic   collapse. Although the IVC is collapsible. Discussed with CT Surgery ELDA Dey MD Electronically signed on 12/27/2021 at 1:37:19 PM

## 2021-12-27 NOTE — DIETITIAN INITIAL EVALUATION ADULT. - SIGNS/SYMPTOMS
as evidenced by pt with advanced metastatic breast ca, respiratory failure and fluctuating po intake

## 2021-12-27 NOTE — PROGRESS NOTE ADULT - ASSESSMENT
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 Santana), Anxiety, and Muscle Spasms who presents to the ED with complaints of worsening SOB and found to have bilateral pleural effusions, left sided opacities and worsening lymphangitic spread of disease. Reports recent admission 12/3 for same at [facility] - had CT pleural effusion drainage > 1lit. She was discharged home on 12/13 on 2-4 lit NC oxygen. She had chest tube placement on 12/23 per thoracic sx service with 1lit drained. ECHO - showed moderate to severe pericardial effusion with cardiac tamponade physiology. this was discussed extensively with cardio, thoracic sx for possible pericardial window - but at this time, she is not deemed a good candidate for this.      Acute on Chronic Hypoxic Respiratory Failure likely due to Malignant exudative Effusions and worsening lymphangitic spread of disease; rule out Atypical PNA  by lights criteria effusion is Exudative   - s/p CT  -currently on Hi-José 35%   -CT angio negative for PE; bilateral pleural effusions (left > right), left sided opacities (atelectasis vs atypical PNA) and worsening lymphangitic spread of disease  -blood cultures negative  - Per ID dc abx as cultures are negative.   -Bronchodilators and cough expectorant as needed  -anxiolytics and analgesia as needed  -incentive spirometry  -Pulm following   -monitor respiratory status closely, prognosis guarded  - Onc consult appreciated.     Metastatic, Infiltrating Ductal Carcinoma of the Right Breast  -with liver and bone mets (pathologic, compression fractures of C3 and T2/T7)  -underwent immunotherapy on 12/2 and developed a hypersensitivity reaction  -was scheduled for chemo on 12/27 with Dr. Chapa ([facility]). Chemo ordered by Hem/onc Herceptin and Docetaxel to be given today. Discussed with Dr. Rivers.    Anxiety  -on Xanax at home  -will switch to IV ativan while inpatient  -continue escitalopram which was recently started    Back Pain due to Pathologic Compression Fractures  -known C3/T2 compression fractures  -CT angio with new T7 compression fracture with mild retropulsion   -Obtain dedicated imaging of T-spine  -MRIs reviewed from November 2021  -ortho spine consulted (Follows with Dr. Santana as outpatient)  -f/u  CT and MRI spine   -Continue Morphine ER 30 mg TID  -IV dilaudid for mod/severe pain as needed  -Continue muscle relaxer - on carisoprodol as needed    Moderate Pericardial Effusion  -likely malignant  -TNI negative; BNP within normal range  -patient reports effusion was small on recent TTE at [place] [facility] on 12/12 or 12/13  -patient was seen by [facility] cardio at that time  - TTE - here - moderate pericardial effusion - possible early cardiac tamponade physiology - not a suitable candidate for pericardial window placement at this time per thoracic Sx.    - limited echos today to eval size per Cardiology. Pending results may need pericardiocentesis.     KURT, improved  - encouraged hydration, Will hold off on IVFs for now to prevent reaccumulation of pleural effusion    Constipation  - senna and Miralax    DVT ppx Lovenox  Dispo: remains acute on IV abx and Chest tube.  and Tele bed.   Discussed with Hem/onc and Cardiology.

## 2021-12-27 NOTE — PROGRESS NOTE ADULT - PROBLEM SELECTOR PLAN 2
- Moderate pericardial effusion on TTE 12/25.  - Small on CT scan 12/22.  - Repeat TTE x2 showing moderate pericardial effusion, unchanged over 2 days; borderline tamponade physiology.  - Dr. Sow to speak with Interventional Cardiology/IR for possible drainage 12/27  - Repeat TTE pending today for evaluation by Interventional Cardiology  - Plan of care Discussed with Dr. Sow.

## 2021-12-27 NOTE — PROGRESS NOTE ADULT - ASSESSMENT
Assess    Metastatic Breast Ca with lymphangitic spread and hypoxia/ increase work of breathing  Malignant effusion post pigtail drainage  Pericardial effusion without tamponade to this point    Rec    Brief steroid  Chemo per Heme/Onc  Pleural drainage per T-Srg  Pericardial monitoring per cardiology

## 2021-12-27 NOTE — PROGRESS NOTE ADULT - PROBLEM SELECTOR PLAN 1
- 12/22 CT Angio chest showing stable small-moderate Left pleural effusion and small right pleural effusion with extensive lymphangitis metastases within the lungs, with atelectasis, pulm .  - Now s/p left pigtail 12/23  - Maintain left pigtail to suction per Dr. Sow.  - maintaining sat >90% on 14L 35% HFNC  - Plan of care Discussed with Dr. Sow.

## 2021-12-27 NOTE — PROGRESS NOTE ADULT - SUBJECTIVE AND OBJECTIVE BOX
Patient is a 36y old  Female who presents with a chief complaint of SOB (24 Dec 2021 12:20)      HPI:  Patient is a 36 year old female with recently diagnosed Metastatic Right Breast CA (Infiltrating Ductal Carcinoma with mets to the Spine and Liver), Malignant Pleural Effusion on Home O2 (3-5 liters), Pathologic Compression Fractures (Follows with Dr. Santana), Anxiety, and Muscle Spasms who presents to the ED with complaints of worsening SOB. Patient was recently admitted in [facility] from 12/2-12/13 for SOB following her first dose of Perjeta.  She required a left sided chest tube.  Patient states she was discharged on home O2 and was doing relatively well until today when she acutely became more dyspneic. Pulse ox was 84% on 5 liters and patient reports feeling an overwhelming feeling of doom and chest pressure. Denies any recent fevers or chills, but does report a productive cough (intermittently yellow). CT angio was negative for PE, but revealed bilateral effusions (larger on the left), worsening lymphangitic spread and left sided opacities (Atelectasis vs PNA).  In the ED, patient was initially placed on bipap and seen by MICU who switched the patient to Hi-diana and recommended admission to step down unit. Patient seen and examined, reports SOB has improved since initial presentation but visibly appears anxious. Complaining of pleuritic right sided chest pain when coughing and chronic back pain for which she follows with Dr. Santana. Denies lightheadedness, dizziness, chest pain at this time, vomiting, abdominal pain or diarrhea. Otherwise, reports mild nausea and weakness.  (22 Dec 2021 23:55)     12/27  Has left chest tube in place   pt more comfortable now; sitting in bed ; chest pain, sob is somewhat better.  Chest tube still draining.  no fevers.  on HiFlo [id number] at  at 35%  Has not had a BM since 12/24/21. Got miralax yesterday.       MEDICATIONS  (STANDING):  enoxaparin Injectable 40 milliGRAM(s) SubCutaneous daily  escitalopram 5 milliGRAM(s) Oral daily  lidocaine   4% Patch 1 Patch Transdermal daily  methylPREDNISolone sodium succinate Injectable 60 milliGRAM(s) IV Push every 6 hours  modafinil 100 milliGRAM(s) Oral daily  morphine ER Tablet 30 milliGRAM(s) Oral three times a day  polyethylene glycol 3350 17 Gram(s) Oral daily  senna 2 Tablet(s) Oral at bedtime    MEDICATIONS  (PRN):  acetaminophen     Tablet .. 650 milliGRAM(s) Oral every 6 hours PRN Temp greater or equal to 38C (100.4F), Mild Pain (1 - 3)  albuterol/ipratropium for Nebulization 3 milliLiter(s) Nebulizer every 6 hours PRN Shortness of Breath and/or Wheezing  Carisoprodol 250 milliGRAM(s) 250 milliGRAM(s) Oral four times a day PRN muscle spasm  guaiFENesin Oral Liquid (Sugar-Free) 200 milliGRAM(s) Oral every 6 hours PRN Cough  HYDROmorphone  Injectable 0.5 milliGRAM(s) IV Push every 4 hours PRN Moderate Pain (4 - 6)  HYDROmorphone  Injectable 1 milliGRAM(s) IV Push every 4 hours PRN Severe Pain (7 - 10)  LORazepam   Injectable 0.5 milliGRAM(s) IV Push three times a day PRN Anxiety  promethazine 12.5 milliGRAM(s) Oral two times a day PRN nausea      Vital Signs Last 24 Hrs  T(C): 36.9 (27 Dec 2021 04:00), Max: 37.2 (26 Dec 2021 16:50)  T(F): 98.4 (27 Dec 2021 04:00), Max: 98.9 (26 Dec 2021 16:50)  HR: 77 (27 Dec 2021 04:00) (70 - 87)  BP: 131/76 (27 Dec 2021 04:00) (114/74 - 141/80)  BP(mean): --  RR: 19 (27 Dec 2021 04:00) (19 - 20)  SpO2: 94% (27 Dec 2021 05:15) (92% - 95%)  PHYSICAL EXAM  General: adult in NAD  HEENT: clear oropharynx, anicteric sclera, pink conjunctiva  Neck: supple  CV: normal S1/S2 with no murmur rubs or gallops  Lungs: left chest tube with serous fluid   Abdomen: soft non-tender non-distended, no hepatosplenomegaly  Ext: no clubbing cyanosis or edema  Skin: no rashes and no petechiae  Neuro: alert and oriented X 4, no focal deficits      LABS:                          11.8   11.52 )-----------( 518      ( 27 Dec 2021 05:40 )             37.6        10.57  H/H 11/34  plt ct 493  (12/26/2021)   7.57  H/H 11.5/36.8 plt ct 484,000 (12/25/2021)                11.0   11.28 )-----------( 471      ( 24 Dec 2021 06:03 )             35.0           Serial CBC's  12-24 @ 06:03  Hct-35.0 / Hgb-11.0 / Plat-471 / RBC-4.00 / WBC-11.28  Serial CBC's  12-23 @ 06:51  Hct-35.3 / Hgb-11.0 / Plat-486 / RBC-4.07 / WBC-7.92  Serial CBC's  12-22 @ 20:59  Hct-36.4 / Hgb-11.7 / Plat-470 / RBC-4.16 / WBC-9.43    12-27    137  |  101  |  30.7<H>  ----------------------------<  159<H>  4.8   |  24.0  |  1.26    Ca    8.7      27 Dec 2021 05:40  Mg     2.6     12-27    TPro  6.2<L>  /  Alb  3.0<L>  /  TBili  0.4  /  DBili  x   /  AST  27  /  ALT  24  /  AlkPhos  390<H>  12-27 12-24    134<L>  |  98  |  14.6  ----------------------------<  103<H>  3.5   |  22.0  |  0.81    Ca    8.9      24 Dec 2021 06:01  Phos  3.9     12-23  Mg     2.2     12-23        PT/INR - ( 22 Dec 2021 20:59 )   PT: 15.5 sec;   INR: 1.36 ratio         PTT - ( 22 Dec 2021 20:59 )  PTT:36.7 sec

## 2021-12-27 NOTE — PROGRESS NOTE ADULT - SUBJECTIVE AND OBJECTIVE BOX
[facility] - [facility], THE HEART CENTER                                   [address] [place] [address]                                                      PHONE: [phone]                                                         FAX: [phone]  [email or web address]  ---------------------------------------------------------------------------------------------------------------------------------    Overnight events/patient complaints:    INTERPRETATION OF TELEMETRY (personally reviewed):    ECG:    ECHO:    STRESS TEST:    CARDIAC CATHETERIZATION:    I&O's Summary    26 Dec 2021 07:01  -  27 Dec 2021 07:00  --------------------------------------------------------  IN: 2020 mL / OUT: 1965 mL / NET: 55 mL        PAST MEDICAL & SURGICAL HISTORY:  Breast CA    H/O compression fracture of spine    Anxiety    S/P tonsillectomy        Perjeta (Other (Severe))  pertuzumab (Other (Severe))    MEDICATIONS  (STANDING):  enoxaparin Injectable 40 milliGRAM(s) SubCutaneous daily  escitalopram 5 milliGRAM(s) Oral daily  lidocaine   4% Patch 1 Patch Transdermal daily  methylPREDNISolone sodium succinate Injectable 60 milliGRAM(s) IV Push every 6 hours  modafinil 100 milliGRAM(s) Oral daily  morphine ER Tablet 30 milliGRAM(s) Oral three times a day  polyethylene glycol 3350 17 Gram(s) Oral daily  senna 2 Tablet(s) Oral at bedtime    MEDICATIONS  (PRN):  acetaminophen     Tablet .. 650 milliGRAM(s) Oral every 6 hours PRN Temp greater or equal to 38C (100.4F), Mild Pain (1 - 3)  albuterol/ipratropium for Nebulization 3 milliLiter(s) Nebulizer every 6 hours PRN Shortness of Breath and/or Wheezing  Carisoprodol 250 milliGRAM(s) 250 milliGRAM(s) Oral four times a day PRN muscle spasm  guaiFENesin Oral Liquid (Sugar-Free) 200 milliGRAM(s) Oral every 6 hours PRN Cough  HYDROmorphone  Injectable 0.5 milliGRAM(s) IV Push every 4 hours PRN Moderate Pain (4 - 6)  HYDROmorphone  Injectable 1 milliGRAM(s) IV Push every 4 hours PRN Severe Pain (7 - 10)  LORazepam   Injectable 0.5 milliGRAM(s) IV Push three times a day PRN Anxiety  promethazine 12.5 milliGRAM(s) Oral two times a day PRN nausea      Vital Signs Last 24 Hrs  T(C): 36.9 (27 Dec 2021 04:00), Max: 37.2 (26 Dec 2021 16:50)  T(F): 98.4 (27 Dec 2021 04:00), Max: 98.9 (26 Dec 2021 16:50)  HR: 77 (27 Dec 2021 04:00) (70 - 87)  BP: 131/76 (27 Dec 2021 04:00) (114/74 - 141/80)  BP(mean): --  RR: 19 (27 Dec 2021 04:00) (19 - 20)  SpO2: 94% (27 Dec 2021 05:15) (92% - 95%)  ICU Vital Signs Last 24 Hrs    PHYSICAL EXAM:  General: Appears well developed, well nourished alert and cooperative.  HEENT: Head; normocephalic, atraumatic.Pupils reactive, cornea wnl. Neck supple, no nodes adenopathy, no JVD  CARDIOVASCULAR: Normal S1 and S2, 1/6 MISAEL, no rub, gallop or lift.   LUNGS: No rales, rhonchi or wheeze. Normal breath sounds bilaterally.  ABDOMEN: Soft, nontender without mass or organomegaly. bowel sounds normoactive.  EXTREMITIES: No clubbing, cyanosis or edema.   SKIN: warm and dry with normal turgor.  NEURO: Alert/oriented x 3/normal motor exam.   PSYCH: normal affect.        LABS:                        11.8   11.52 )-----------( 518      ( 27 Dec 2021 05:40 )             37.6     12-27    137  |  101  |  30.7<H>  ----------------------------<  159<H>  4.8   |  24.0  |  1.26    Ca    8.7      27 Dec 2021 05:40  Mg     2.6     12-27    TPro  6.2<L>  /  Alb  3.0<L>  /  TBili  0.4  /  DBili  x   /  AST  27  /  ALT  24  /  AlkPhos  390<H>  12-27    NATIVIDAD MYERS            RADIOLOGY & ADDITIONAL STUDIES:    ASSESSMENT AND PLAN:  In summary, NATIVIDAD MYERS is a 36y Female with past medical history significant for     Thank you for allowing [facility] to participate in the care of this patient.  Please feel free to call with any questions or concerns.                  [place] [facility] - [facility], THE HEART CENTER                                   [address] [place] [address]                                                      PHONE: [phone]                                                         FAX: [phone]  [email or web address]  ---------------------------------------------------------------------------------------------------------------------------------    Overnight events/patient complaints: planned for chemotherapy today     INTERPRETATION OF TELEMETRY (personally reviewed)    < from: TTE Echo Complete w/ Contrast w/ Doppler (12.25.21 @ 10:33) >   1. Normal global left ventricular systolic function.   2. Moderate pericardial effusion.   3. *there are signs of borderline tamponade physiology including early   systolic right atrial inversion and tricuspid inflow variation.   4. Unchanged from prior echo.      I&O's Summary    26 Dec 2021 07:01  -  27 Dec 2021 07:00  --------------------------------------------------------  IN: 2020 mL / OUT: 1965 mL / NET: 55 mL        PAST MEDICAL & SURGICAL HISTORY:  Breast CA    H/O compression fracture of spine    Anxiety    S/P tonsillectomy        Perjeta (Other (Severe))  pertuzumab (Other (Severe))    MEDICATIONS  (STANDING):  enoxaparin Injectable 40 milliGRAM(s) SubCutaneous daily  escitalopram 5 milliGRAM(s) Oral daily  lidocaine   4% Patch 1 Patch Transdermal daily  methylPREDNISolone sodium succinate Injectable 60 milliGRAM(s) IV Push every 6 hours  modafinil 100 milliGRAM(s) Oral daily  morphine ER Tablet 30 milliGRAM(s) Oral three times a day  polyethylene glycol 3350 17 Gram(s) Oral daily  senna 2 Tablet(s) Oral at bedtime    MEDICATIONS  (PRN):  acetaminophen     Tablet .. 650 milliGRAM(s) Oral every 6 hours PRN Temp greater or equal to 38C (100.4F), Mild Pain (1 - 3)  albuterol/ipratropium for Nebulization 3 milliLiter(s) Nebulizer every 6 hours PRN Shortness of Breath and/or Wheezing  Carisoprodol 250 milliGRAM(s) 250 milliGRAM(s) Oral four times a day PRN muscle spasm  guaiFENesin Oral Liquid (Sugar-Free) 200 milliGRAM(s) Oral every 6 hours PRN Cough  HYDROmorphone  Injectable 0.5 milliGRAM(s) IV Push every 4 hours PRN Moderate Pain (4 - 6)  HYDROmorphone  Injectable 1 milliGRAM(s) IV Push every 4 hours PRN Severe Pain (7 - 10)  LORazepam   Injectable 0.5 milliGRAM(s) IV Push three times a day PRN Anxiety  promethazine 12.5 milliGRAM(s) Oral two times a day PRN nausea      Vital Signs Last 24 Hrs  T(C): 36.9 (27 Dec 2021 04:00), Max: 37.2 (26 Dec 2021 16:50)  T(F): 98.4 (27 Dec 2021 04:00), Max: 98.9 (26 Dec 2021 16:50)  HR: 77 (27 Dec 2021 04:00) (70 - 87)  BP: 131/76 (27 Dec 2021 04:00) (114/74 - 141/80)  BP(mean): --  RR: 19 (27 Dec 2021 04:00) (19 - 20)  SpO2: 94% (27 Dec 2021 05:15) (92% - 95%)  ICU Vital Signs Last 24 Hrs    PHYSICAL EXAM:  General: Appears well developed, well nourished alert and cooperative.  HEENT: Head; normocephalic, atraumatic.Pupils reactive, cornea wnl. Neck supple, no nodes adenopathy, no JVD  CARDIOVASCULAR: Normal S1 and S2, 1/6 MISAEL, no rub, gallop or lift.   LUNGS: No rales, rhonchi or wheeze. Normal breath sounds bilaterally.  ABDOMEN: Soft, nontender without mass or organomegaly. bowel sounds normoactive.  EXTREMITIES: No clubbing, cyanosis or edema.   SKIN: warm and dry with normal turgor.  NEURO: Alert/oriented x 3/normal motor exam.   PSYCH: normal affect.        LABS:                        11.8   11.52 )-----------( 518      ( 27 Dec 2021 05:40 )             37.6     12-27    137  |  101  |  30.7<H>  ----------------------------<  159<H>  4.8   |  24.0  |  1.26    Ca    8.7      27 Dec 2021 05:40  Mg     2.6     12-27    TPro  6.2<L>  /  Alb  3.0<L>  /  TBili  0.4  /  DBili  x   /  AST  27  /  ALT  24  /  AlkPhos  390<H>  12-27      ASSESSMENT AND PLAN:  In summary, NATIVIDAD MYERS is a 36y Female with past medical history significant for metastatic infiltrating ductal carcinoma, who was recently admitted in [facility] from 12/12-12/13 for SOB and found to have a malignant pleural effusion s/p chest tube placement and discharged on home O2 who presents with increasing shortness of breath found to have bilateral effusions (larger on the left), worsening lymphangitic spread, left sided opacities and moderate pericardial effusion     Acute hypoxic respiratory failure/pleural effusion/pericardial effusion  - doubt pericardial effusion is primary  for her worsening respiratory status, particularly in light of absence of clinical tamponade, dyspnea is likely 2/2 to progression of disease with lymphangitic spread,  possible PNA  - continue O2 support, currently on high flow   - limited TTE planned to assess for interval progression prior to consideration for pericardiocentesis  - discussed with interventional cardiologist and primary team   - planned for chemotherapy   - further recommendations pending review of repeat diagnostic testing    Thank you for allowing [facility] to participate in the care of this patient.  Please feel free to call with any questions or concerns.

## 2021-12-27 NOTE — DIETITIAN INITIAL EVALUATION ADULT. - ORAL INTAKE PTA/DIET HISTORY
Pt reports fair po intake at meals; states appetite fluctuates.  Pt consuming Ensure supplements.  Pt states similar po intake prior to admission due to fluctuating appetite and anxiety.  Pt denies wt loss at this time.  Discussed the benefits of Ensure supplements and importance of protein intake.  Pt receptive.

## 2021-12-27 NOTE — PROGRESS NOTE ADULT - SUBJECTIVE AND OBJECTIVE BOX
[facility] Division of Hospital Medicine    Chief Complaint: Shortness of breath     SUBJECTIVE / OVERNIGHT EVENTS: Patient seen and examined. No Bm but she is passing gas from below. No abdominal pain. Planning for Chemo today. Breathing is better.     Patient denies chest pain, SOB, abd pain, N/V, fever, chills, dysuria or any other complaints. All remainder ROS negative.     MEDICATIONS  (STANDING):  DOCEtaxel IVPB (eMAR) 105 milliGRAM(s) IV Intermittent once  enoxaparin Injectable 40 milliGRAM(s) SubCutaneous daily  escitalopram 5 milliGRAM(s) Oral daily  methylPREDNISolone sodium succinate Injectable 60 milliGRAM(s) IV Push every 6 hours  modafinil 100 milliGRAM(s) Oral daily  morphine ER Tablet 30 milliGRAM(s) Oral three times a day  polyethylene glycol 3350 17 Gram(s) Oral daily  senna 2 Tablet(s) Oral at bedtime  trastuzumab (HERCEPTIN) IVPB (eMAR) 456 milliGRAM(s) IV Intermittent once    MEDICATIONS  (PRN):  acetaminophen     Tablet .. 650 milliGRAM(s) Oral every 6 hours PRN Temp greater or equal to 38C (100.4F), Mild Pain (1 - 3)  albuterol/ipratropium for Nebulization 3 milliLiter(s) Nebulizer every 6 hours PRN Shortness of Breath and/or Wheezing  Carisoprodol 250 milliGRAM(s) 250 milliGRAM(s) Oral four times a day PRN muscle spasm  guaiFENesin Oral Liquid (Sugar-Free) 200 milliGRAM(s) Oral every 6 hours PRN Cough  HYDROmorphone  Injectable 0.5 milliGRAM(s) IV Push every 4 hours PRN Moderate Pain (4 - 6)  HYDROmorphone  Injectable 1 milliGRAM(s) IV Push every 4 hours PRN Severe Pain (7 - 10)  LORazepam   Injectable 0.5 milliGRAM(s) IV Push three times a day PRN Anxiety  promethazine 12.5 milliGRAM(s) Oral two times a day PRN nausea        I&O's Summary    26 Dec 2021 07:01  -  27 Dec 2021 07:00  --------------------------------------------------------  IN: 2020 mL / OUT: 1965 mL / NET: 55 mL        PHYSICAL EXAM:  Vital Signs Last 24 Hrs  T(C): 36.7 (27 Dec 2021 08:00), Max: 37.2 (26 Dec 2021 16:50)  T(F): 98.1 (27 Dec 2021 08:00), Max: 98.9 (26 Dec 2021 16:50)  HR: 98 (27 Dec 2021 08:00) (70 - 98)  BP: 116/76 (27 Dec 2021 08:00) (114/74 - 141/80)  BP(mean): --  RR: 20 (27 Dec 2021 08:00) (19 - 20)  SpO2: 91% (27 Dec 2021 08:00) (91% - 95%)      CONSTITUTIONAL: NAD,   ENMT: Moist oral mucosa, no pharyngeal injection or exudates; normal dentition  RESPIRATORY: Normal respiratory effort; on high flow. left CT. Decreased breath sounds in the left lung base.   CARDIOVASCULAR: Regular rate and rhythm, normal S1 and S2, ; No lower extremity edema;  ABDOMEN: Nontender to palpation, normoactive bowel sounds, no rebound/guarding  MUSCLOSKELETAL: no clubbing or cyanosis of digits; no joint swelling or tenderness to palpation  PSYCH: A+O to person, place, and time; affect appropriate  NEUROLOGY: CN 2-12 are intact and symmetric; no gross sensory deficits;   SKIN: No rashes; no palpable lesions    LABS:                        11.8   11.52 )-----------( 518      ( 27 Dec 2021 05:40 )             37.6     12-27    137  |  101  |  30.7<H>  ----------------------------<  159<H>  4.8   |  24.0  |  1.26    Ca    8.7      27 Dec 2021 05:40  Mg     2.6     12-27    TPro  6.2<L>  /  Alb  3.0<L>  /  TBili  0.4  /  DBili  x   /  AST  27  /  ALT  24  /  AlkPhos  390<H>  12-27              CAPILLARY BLOOD GLUCOSE            RADIOLOGY & ADDITIONAL TESTS:  Results Reviewed:   Imaging Personally Reviewed:  Electrocardiogram Personally Reviewed:

## 2021-12-27 NOTE — DIETITIAN INITIAL EVALUATION ADULT. - PERTINENT LABORATORY DATA
12-27 Na137 mmol/L Glu 159 mg/dL<H> K+ 4.8 mmol/L Cr  1.26 mg/dL BUN 30.7 mg/dL<H> Phos n/a   Alb 3.0 g/dL<L> PAB n/a

## 2021-12-27 NOTE — PROGRESS NOTE ADULT - ASSESSMENT
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 Santana), Anxiety, and Muscle Spasms who presents to the ED with complaints of worsening SOB.    CT angio was negative for PE, but revealed bilateral effusions (larger on the left), worsening lymphangitic spread and left sided opacities (Atelectasis vs PNA).    pt s/p chest tube with continued serous fluid   pt more comfortable now; sitting in bed   no fevers.  on HiFlo O2 at 35%    Metastatic Her 2 + breast ca   - poor tolerance to Perjeta   - rapidly progressive disease with malignant pleural effusion and lymphangitic spread with resp compromise   - d/w her primary Oncologist Dr Chapa; will plan to start chemo with Taxotere + Herceptin asap.  d/w nurse manager and pharmacy.  Chemo drugs unavailable were not available over the weekend. Plan is start today.   - chemo orders written and in chart. consent obtained   - close pulm f/u .  d/w Pulm . High dose steroids.  Solumedrol 60mg IV Q6hrs   -potential IR-guided drainage of pericardial effusion  - spoke with nurse, plan associated with chemo has been coordinated and will happen today.     Malignant Pleural / pericardial effusion   - s/p Left chest tube   - TTE to eval pericardial effusion   - CT sx following ; possible IR-guided drainage.    Constipation - management as per primary team.     Thank you,      Pan Rivers MD  [facility]  [phone]

## 2021-12-27 NOTE — PROGRESS NOTE ADULT - TIME BILLING
Review of hospital charts, including PACS and labs, and office and outside records if applicable.  Discussion of plans with referring service, coordinating respiratory therapy, including, but not limited to, CPAP, NIPPV, O2, Nebs, home devices.
Review of notes, orders, labs and images on all accessible EHR platforms  Bedside evaluation  Coordination with all active services, nursing and respiratory
I attest that I have seen and examined the patient

## 2021-12-27 NOTE — PROGRESS NOTE ADULT - ASSESSMENT
36y  Female with h/o Metastatic Right Breast CA (Infiltrating Ductal Carcinoma with mets to the Spine and Liver), Malignant Pleural Effusion on Home O2 (3-5 liters), Pathologic Compression Fractures (Follows with Dr. Santana), Anxiety, and Muscle Spasms. Patient presents to the ED with complaints of worsening SOB. Patient was recently admitted in [facility] from 12/12-12/13 for SOB and found to have a malignant left sided chest tube requiring a chest tube. Patient states she was discharged on home O2 and was doing relatively well until today when she acutely became more dyspneic. Pulse ox was 84% on 5 liters and patient reports feeling an overwhelming feeling of doom and chest pressure. Denies any recent fevers or chills. Her son was home with a viral illness prior to presentation. CT angio was negative for PE,but revealed bilateral effusions (larger on the left), worsening lymphangitic spread and left sided opacities (Atelectasis vs PNA).  In the ED, patient was initially placed on BiPAP and seen by MICU who switched the patient to Hi-diana and recommended admission to step down unit. Patient seen and examined, reports SOB has improved since initial presentation but visibly appears anxious. Complaining of pleuritic right sided chest pain when coughing and chronic back pain for which she follows with Dr. Santana. Denies lightheadedness, dizziness, chest pain at this time, vomiting, abdominal pain or diarrhea. Otherwise, reports mild nausea and weakness. Was started on Vancomycin and Zosyn.      Acute hypoxic respiratory failure   Malignant Pleural Effusion  ? PNA on CT  Metastatic Right Breast CA  Immunosuppressed due to chemotherapy      - Blood cultures no growth   - Repeat blood cultures if febrile   - RVP/COVID 19 PCR negative   - CXR with left chest tube  - CT Chest reporting b/l effusions   - Procalcitonin level 0.09  - D/C Zosyn  - Monitor off antibiotics  - Follow up cultures  - Trend Fever  - Trend WBC      Will Follow    d/w Dr Cooper

## 2021-12-27 NOTE — PROGRESS NOTE ADULT - SUBJECTIVE AND OBJECTIVE BOX
Patient was seen and examined at approximately 750am    ORTHO-SPINE SERVICE      NATIVIDAD MYERS    [id number]    History:  The patient is currently being treated for a pathologic thoracic compression fractures. Patient is doing well. The patient's pain is controlled using the prescribed pain medications. The patient is pending MRIs of her spine because of pleural and pericardial effusions.  No new complaints of back or neck pain. No acute motor or sensory changes are reported.         Vital Signs Last 24 Hrs  T(C): 36.9 (27 Dec 2021 04:00), Max: 37.2 (26 Dec 2021 16:50)  T(F): 98.4 (27 Dec 2021 04:00), Max: 98.9 (26 Dec 2021 16:50)  HR: 77 (27 Dec 2021 04:00) (70 - 87)  BP: 131/76 (27 Dec 2021 04:00) (114/74 - 141/80)  BP(mean): --  RR: 19 (27 Dec 2021 04:00) (19 - 20)  SpO2: 94% (27 Dec 2021 05:15) (92% - 95%)                          11.8   11.52 )-----------( 518      ( 27 Dec 2021 05:40 )             37.6     12-27    137  |  101  |  30.7<H>  ----------------------------<  159<H>  4.8   |  24.0  |  1.26    Ca    8.7      27 Dec 2021 05:40  Mg     2.6     12-27    TPro  6.2<L>  /  Alb  3.0<L>  /  TBili  0.4  /  DBili  x   /  AST  27  /  ALT  24  /  AlkPhos  390<H>  12-27      MEDICATIONS  (STANDING):  enoxaparin Injectable 40 milliGRAM(s) SubCutaneous daily  escitalopram 5 milliGRAM(s) Oral daily  lidocaine   4% Patch 1 Patch Transdermal daily  methylPREDNISolone sodium succinate Injectable 60 milliGRAM(s) IV Push every 6 hours  modafinil 100 milliGRAM(s) Oral daily  morphine ER Tablet 30 milliGRAM(s) Oral three times a day  polyethylene glycol 3350 17 Gram(s) Oral daily  senna 2 Tablet(s) Oral at bedtime    MEDICATIONS  (PRN):  acetaminophen     Tablet .. 650 milliGRAM(s) Oral every 6 hours PRN Temp greater or equal to 38C (100.4F), Mild Pain (1 - 3)  albuterol/ipratropium for Nebulization 3 milliLiter(s) Nebulizer every 6 hours PRN Shortness of Breath and/or Wheezing  Carisoprodol 250 milliGRAM(s) 250 milliGRAM(s) Oral four times a day PRN muscle spasm  guaiFENesin Oral Liquid (Sugar-Free) 200 milliGRAM(s) Oral every 6 hours PRN Cough  HYDROmorphone  Injectable 0.5 milliGRAM(s) IV Push every 4 hours PRN Moderate Pain (4 - 6)  HYDROmorphone  Injectable 1 milliGRAM(s) IV Push every 4 hours PRN Severe Pain (7 - 10)  LORazepam   Injectable 0.5 milliGRAM(s) IV Push three times a day PRN Anxiety  promethazine 12.5 milliGRAM(s) Oral two times a day PRN nausea      Physical exam: Sensation to light touch of the lower extremities is grossly intact without focal deficit and is symmetric bilaterally. No tremor or clonus. Babinski test is negative. Motor function is 5/5 without focal deficit. No calf tenderness. There is good passive range of motion of the hips and knees without noted joint pain provocation. 2+ dorsalis pedis pulse. Capillary refill is less than 2 seconds. No cyanosis. No tenderness of the spine to light palpation.     Primary Orthopedic Assessment:  • Pathologic Thoracic Spine Compression Fractures      Plan:   • DVT prophylaxis as prescribed, including use of compression devices and ankle pumps  • Pending MRIs of entire spine - to be done once CTS plan is developed  • Incentive spirometry encouraged  • Pain control - continue current plan as pain is improved

## 2021-12-27 NOTE — DIETITIAN INITIAL EVALUATION ADULT. - OTHER INFO
Pt recently diagnosed Metastatic Right Breast CA (Infiltrating Ductal Carcinoma with mets to the Spine and Liver), Malignant Pleural Effusion on Home O2 (3-5 liters), Pathologic Compression Fractures (Follows with Dr. Santana), Anxiety, and Muscle Spasms who presents to the ED with complaints of worsening SOB and found to have bilateral pleural effusions, left sided opacities and worsening lymphangitic spread of disease. Reports recent admission 12/3 for same at [facility] - had CT pleural effusion drainage > 1lit. She was discharged home on 12/13 on 2-4 lit NC oxygen. She had chest tube placement on 12/23 per thoracic sx service with 1lit drained. ECHO - showed moderate to severe pericardial effusion with cardiac tamponade physiology. This was discussed extensively with cardio, thoracic sx for possible pericardial window - but at this time, she is not deemed a good candidate for this.

## 2021-12-27 NOTE — PROGRESS NOTE ADULT - SUBJECTIVE AND OBJECTIVE BOX
[facility]  INFECTIOUS DISEASES AND INTERNAL MEDICINE at [place]  =======================================================  Warner Dale MD  Diplomates American Board of Internal Medicine and Infectious Diseases  Tel: [phone]      Fax: [phone] / [phone]  =======================================================    NATIVIDAD MYERS [id number]    Follow up:  ? PNA on CT    No fever or chills  continues to be SOB    Allergies:  Perjeta (Other (Severe))  pertuzumab (Other (Severe))       REVIEW OF SYSTEMS:  CONSTITUTIONAL:  No Fever or chills  HEENT:  No diplopia or blurred vision.  No earache, sore throat or runny nose.  CARDIOVASCULAR:  No pressure, squeezing, strangling, tightness, heaviness or aching about the chest, neck, axilla or epigastrium.  RESPIRATORY:  No cough, + shortness of breath  GASTROINTESTINAL:  No nausea, vomiting or diarrhea.  GENITOURINARY:  No dysuria, frequency or urgency. No Blood in urine  MUSCULOSKELETAL:  no joint aches, no muscle pain  SKIN:  No change in skin, hair or nails.  NEUROLOGIC:  No Headaches, seizures  PSYCHIATRIC:  No disorder of thought or mood.  ENDOCRINE:  No heat or cold intolerance  HEMATOLOGICAL:  No easy bruising or bleeding.       Physical Exam:  GEN: NAD, pleasant  HEENT: normocephalic and atraumatic. EOMI. PERRL.  Anicteric  NECK: Supple.   LUNGS: Course BS B/L   HEART: Regular rate and rhythm   ABDOMEN: Soft, nontender, and nondistended.  Positive bowel sounds.    : No CVA tenderness  EXTREMITIES: Without any edema.  MSK: No joint swelling  NEUROLOGIC: No Focal Deficits  PSYCHIATRIC: Appropriate affect .  SKIN: No Rash      Vitals:  T(F): 98.4 (27 Dec 2021 04:00), Max: 98.9 (26 Dec 2021 16:50)  HR: 77 (27 Dec 2021 04:00)  BP: 131/76 (27 Dec 2021 04:00)  RR: 19 (27 Dec 2021 04:00)  SpO2: 94% (27 Dec 2021 05:15) (92% - 95%)  temp max in last 48H T(F): , Max: 98.9 (12-25-21 @ 12:00)      Current Antibiotics:    Other medications:  enoxaparin Injectable 40 milliGRAM(s) SubCutaneous daily  escitalopram 5 milliGRAM(s) Oral daily  lidocaine   4% Patch 1 Patch Transdermal daily  methylPREDNISolone sodium succinate Injectable 60 milliGRAM(s) IV Push every 6 hours  modafinil 100 milliGRAM(s) Oral daily  morphine ER Tablet 30 milliGRAM(s) Oral three times a day  polyethylene glycol 3350 17 Gram(s) Oral daily  senna 2 Tablet(s) Oral at bedtime                            11.8   11.52 )-----------( 518      ( 27 Dec 2021 05:40 )             37.6     12-27    137  |  101  |  30.7<H>  ----------------------------<  159<H>  4.8   |  24.0  |  1.26    Ca    8.7      27 Dec 2021 05:40  Mg     2.6     12-27    TPro  6.2<L>  /  Alb  3.0<L>  /  TBili  0.4  /  DBili  x   /  AST  27  /  ALT  24  /  AlkPhos  390<H>  12-27    RECENT CULTURES:  12-24 @ 00:25 .Body Fluid Pleural Fluid     polymorphonuclear leukocytes seen  No organisms seen  by cytocentrifuge    12-23 @ 01:01 .Blood Blood-Peripheral     No growth at 48 hours    12-23 @ 01:00 .Blood Blood-Peripheral     No growth at 48 hours      WBC Count: 11.52 K/uL (12-27-21 @ 05:40)  WBC Count: 10.87 K/uL (12-26-21 @ 06:06)  WBC Count: 7.37 K/uL (12-25-21 @ 07:37)  WBC Count: 11.28 K/uL (12-24-21 @ 06:03)  WBC Count: 7.92 K/uL (12-23-21 @ 06:51)  WBC Count: 9.43 K/uL (12-22-21 @ 20:59)    Creatinine, Serum: 1.26 mg/dL (12-27-21 @ 05:40)  Creatinine, Serum: 1.28 mg/dL (12-26-21 @ 06:11)  Creatinine, Serum: 1.48 mg/dL (12-25-21 @ 07:37)  Creatinine, Serum: 0.81 mg/dL (12-24-21 @ 06:01)  Creatinine, Serum: 0.42 mg/dL (12-23-21 @ 06:51)  Creatinine, Serum: 0.40 mg/dL (12-22-21 @ 20:59)    Procalcitonin, Serum: 0.09 ng/mL (12-23-21 @ 01:02)       < from: Xray Chest 1 View- PORTABLE-Routine (Xray Chest 1 View- PORTABLE-Routine in AM.) (12.25.21 @ 04:30) >  ACC: [id number] EXAM:  XR CHEST PORTABLE ROUTINE 1V                          PROCEDURE DATE:  12/25/2021      INTERPRETATION:  Portable chest radiograph    CLINICAL INFORMATION: LEFT chest tube. Follow-up    TECHNIQUE:  Portable  AP view of the chest.    COMPARISON: No previous examinations are available for review.    FINDINGS: Mediport catheter tip in SVC.  LEFT multi-sidehole pigtail catheter overlies LEFT lower hemithorax.  Unchanged bilateral  multifocal and diffuse ill-defined airspace   opacities..   No pneumothorax.    Heart size difficult to assess due to adjacent opacified parenchyma.    Visualized osseous structures are intact.    IMPRESSION:    Persistent bilateral  multifocal and diffuse ill-defined airspace   opacities..   LEFT multi-sidehole pigtail chest tube catheter in place..  --- End of Report ---  < end of copied text >

## 2021-12-27 NOTE — PROGRESS NOTE ADULT - SUBJECTIVE AND OBJECTIVE BOX
Subjective: Patient reports breathing feels comfortable and she was able to sleep well last night. Patient stating that she will be getting a chemotherapy treatment today. At the current time patient denies CP, palpitations, SOB, cough, fever, chills, itchiness/rash, diaphoresis, vision changes, HA, dizziness/lightheadedness, numbness/tingling, abd pain, N/V.    T(C): 36.9 (12-27-21 @ 04:00), Max: 37.2 (12-26-21 @ 16:50)  HR: 77 (12-27-21 @ 04:00) (70 - 81)  BP: 131/76 (12-27-21 @ 04:00) (114/74 - 141/80)  RR: 19 (12-27-21 @ 04:00) (19 - 20)  SpO2: 94% (12-27-21 @ 05:15) (93% - 95%)    I&O's Detail    26 Dec 2021 07:01  -  27 Dec 2021 07:00  --------------------------------------------------------  IN:    IV PiggyBack: 150 mL    Oral Fluid: 1870 mL  Total IN: 2020 mL    OUT:    Chest Tube (mL): 320 mL    Voided (mL): 1645 mL  Total OUT: 1965 mL    Total NET: 55 mL    Patient's  laboratory results and current medications reviewed.    Physical Exam:  Constitutional: NAD  Neuro: A+O x 3, non-focal, speech clear and intact  CV: regular rate, regular rhythm, +S1S2, no murmurs or rub  Pulm/chest: lung sounds CTA and equal bilaterally, no accessory muscle use noted  Abd: +BS, soft, NT, ND  Ext: DENNEY x 4, no edema   Skin: warm, well perfused  Psych: calm, appropriate affect  Drains: Left pigtail chest tube to suction, c/d/i, no air leak, draining serous fluid     Today's CXR: < from: Xray Chest 1 View- PORTABLE-Routine (Xray Chest 1 View- PORTABLE-Routine in AM.) (12.25.21 @ 04:30) >  COMPARISON: No previous examinations are available for review.    FINDINGS: Mediport catheter tip in SVC.  LEFT multi-sidehole pigtail catheter overlies LEFT lower hemithorax.  Unchanged bilateral  multifocal and diffuse ill-defined airspace   opacities..   No pneumothorax.    Heart size difficult to assess due to adjacent opacified parenchyma.    Visualized osseous structures are intact.    IMPRESSION:    Persistent bilateral  multifocal and diffuse ill-defined airspace   opacities..   LEFT multi-sidehole pigtail chest tube catheter in place..

## 2021-12-28 ENCOUNTER — RESULT REVIEW (OUTPATIENT)
Age: 36
End: 2021-12-28

## 2021-12-28 LAB
ALBUMIN FLD-MCNC: 2.2 G/DL — SIGNIFICANT CHANGE UP
ALBUMIN SERPL ELPH-MCNC: 3 G/DL — LOW (ref 3.3–5.2)
ALP SERPL-CCNC: 352 U/L — HIGH (ref 40–120)
ALT FLD-CCNC: 34 U/L — HIGH
ANION GAP SERPL CALC-SCNC: 14 MMOL/L — SIGNIFICANT CHANGE UP (ref 5–17)
AST SERPL-CCNC: 35 U/L — HIGH
B PERT IGG+IGM PNL SER: ABNORMAL
BILIRUB SERPL-MCNC: 0.4 MG/DL — SIGNIFICANT CHANGE UP (ref 0.4–2)
BUN SERPL-MCNC: 37.8 MG/DL — HIGH (ref 8–20)
CALCIUM SERPL-MCNC: 7.4 MG/DL — LOW (ref 8.6–10.2)
CHLORIDE SERPL-SCNC: 103 MMOL/L — SIGNIFICANT CHANGE UP (ref 98–107)
CO2 SERPL-SCNC: 23 MMOL/L — SIGNIFICANT CHANGE UP (ref 22–29)
COLOR FLD: ABNORMAL
COMMENT - FLUIDS: SIGNIFICANT CHANGE UP
CREAT SERPL-MCNC: 1.06 MG/DL — SIGNIFICANT CHANGE UP (ref 0.5–1.3)
CULTURE RESULTS: SIGNIFICANT CHANGE UP
FLUID INTAKE SUBSTANCE CLASS: SIGNIFICANT CHANGE UP
FLUID SEGMENTED GRANULOCYTES: 3 % — SIGNIFICANT CHANGE UP
GLUCOSE FLD-MCNC: 135 MG/DL — SIGNIFICANT CHANGE UP
GLUCOSE SERPL-MCNC: 146 MG/DL — HIGH (ref 70–99)
GRAM STN FLD: SIGNIFICANT CHANGE UP
HCT VFR BLD CALC: 37.2 % — SIGNIFICANT CHANGE UP (ref 34.5–45)
HGB BLD-MCNC: 11.9 G/DL — SIGNIFICANT CHANGE UP (ref 11.5–15.5)
LDH SERPL L TO P-CCNC: 718 U/L — SIGNIFICANT CHANGE UP
LYMPHOCYTES # FLD: 7 % — SIGNIFICANT CHANGE UP
MAGNESIUM SERPL-MCNC: 2.6 MG/DL — SIGNIFICANT CHANGE UP (ref 1.6–2.6)
MCHC RBC-ENTMCNC: 27.3 PG — SIGNIFICANT CHANGE UP (ref 27–34)
MCHC RBC-ENTMCNC: 32 GM/DL — SIGNIFICANT CHANGE UP (ref 32–36)
MCV RBC AUTO: 85.3 FL — SIGNIFICANT CHANGE UP (ref 80–100)
MESOTHL CELL # FLD: 70 % — SIGNIFICANT CHANGE UP
MONOS+MACROS # FLD: 20 % — SIGNIFICANT CHANGE UP
NIGHT BLUE STAIN TISS: SIGNIFICANT CHANGE UP
PLATELET # BLD AUTO: 525 K/UL — HIGH (ref 150–400)
POTASSIUM SERPL-MCNC: 4.5 MMOL/L — SIGNIFICANT CHANGE UP (ref 3.5–5.3)
POTASSIUM SERPL-SCNC: 4.5 MMOL/L — SIGNIFICANT CHANGE UP (ref 3.5–5.3)
PROT FLD-MCNC: 3.7 G/DL — SIGNIFICANT CHANGE UP
PROT SERPL-MCNC: 6 G/DL — LOW (ref 6.6–8.7)
RBC # BLD: 4.36 M/UL — SIGNIFICANT CHANGE UP (ref 3.8–5.2)
RBC # FLD: 14.2 % — SIGNIFICANT CHANGE UP (ref 10.3–14.5)
RCV VOL RI: HIGH /UL (ref 0–0)
SODIUM SERPL-SCNC: 140 MMOL/L — SIGNIFICANT CHANGE UP (ref 135–145)
SPECIMEN SOURCE: SIGNIFICANT CHANGE UP
TOTAL NUCLEATED CELL COUNT, BODY FLUID: 1508 /UL — SIGNIFICANT CHANGE UP
TUBE TYPE: SIGNIFICANT CHANGE UP
WBC # BLD: 11.45 K/UL — HIGH (ref 3.8–10.5)
WBC # FLD AUTO: 11.45 K/UL — HIGH (ref 3.8–10.5)

## 2021-12-28 PROCEDURE — 99233 SBSQ HOSP IP/OBS HIGH 50: CPT

## 2021-12-28 PROCEDURE — 88112 CYTOPATH CELL ENHANCE TECH: CPT | Mod: 26

## 2021-12-28 PROCEDURE — 88305 TISSUE EXAM BY PATHOLOGIST: CPT | Mod: 26

## 2021-12-28 PROCEDURE — 99231 SBSQ HOSP IP/OBS SF/LOW 25: CPT

## 2021-12-28 PROCEDURE — 88342 IMHCHEM/IMCYTCHM 1ST ANTB: CPT | Mod: 26

## 2021-12-28 PROCEDURE — 88341 IMHCHEM/IMCYTCHM EA ADD ANTB: CPT | Mod: 26

## 2021-12-28 PROCEDURE — 71045 X-RAY EXAM CHEST 1 VIEW: CPT | Mod: 26

## 2021-12-28 PROCEDURE — 99232 SBSQ HOSP IP/OBS MODERATE 35: CPT

## 2021-12-28 RX ADMIN — MORPHINE SULFATE 30 MILLIGRAM(S): 50 CAPSULE, EXTENDED RELEASE ORAL at 06:16

## 2021-12-28 RX ADMIN — POLYETHYLENE GLYCOL 3350 17 GRAM(S): 17 POWDER, FOR SOLUTION ORAL at 11:40

## 2021-12-28 RX ADMIN — MORPHINE SULFATE 30 MILLIGRAM(S): 50 CAPSULE, EXTENDED RELEASE ORAL at 21:26

## 2021-12-28 RX ADMIN — HYDROMORPHONE HYDROCHLORIDE 1 MILLIGRAM(S): 2 INJECTION INTRAMUSCULAR; INTRAVENOUS; SUBCUTANEOUS at 18:33

## 2021-12-28 RX ADMIN — MORPHINE SULFATE 30 MILLIGRAM(S): 50 CAPSULE, EXTENDED RELEASE ORAL at 13:41

## 2021-12-28 RX ADMIN — HYDROMORPHONE HYDROCHLORIDE 1 MILLIGRAM(S): 2 INJECTION INTRAMUSCULAR; INTRAVENOUS; SUBCUTANEOUS at 02:29

## 2021-12-28 RX ADMIN — MORPHINE SULFATE 30 MILLIGRAM(S): 50 CAPSULE, EXTENDED RELEASE ORAL at 21:58

## 2021-12-28 RX ADMIN — Medication 12.5 MILLIGRAM(S): at 16:03

## 2021-12-28 RX ADMIN — MODAFINIL 100 MILLIGRAM(S): 200 TABLET ORAL at 11:39

## 2021-12-28 RX ADMIN — HYDROMORPHONE HYDROCHLORIDE 1 MILLIGRAM(S): 2 INJECTION INTRAMUSCULAR; INTRAVENOUS; SUBCUTANEOUS at 02:47

## 2021-12-28 RX ADMIN — Medication 60 MILLIGRAM(S): at 17:12

## 2021-12-28 RX ADMIN — ESCITALOPRAM OXALATE 5 MILLIGRAM(S): 10 TABLET, FILM COATED ORAL at 11:39

## 2021-12-28 RX ADMIN — ENOXAPARIN SODIUM 40 MILLIGRAM(S): 100 INJECTION SUBCUTANEOUS at 11:40

## 2021-12-28 RX ADMIN — SENNA PLUS 2 TABLET(S): 8.6 TABLET ORAL at 21:26

## 2021-12-28 RX ADMIN — MORPHINE SULFATE 30 MILLIGRAM(S): 50 CAPSULE, EXTENDED RELEASE ORAL at 13:42

## 2021-12-28 RX ADMIN — HYDROMORPHONE HYDROCHLORIDE 1 MILLIGRAM(S): 2 INJECTION INTRAMUSCULAR; INTRAVENOUS; SUBCUTANEOUS at 18:34

## 2021-12-28 RX ADMIN — HYDROMORPHONE HYDROCHLORIDE 1 MILLIGRAM(S): 2 INJECTION INTRAMUSCULAR; INTRAVENOUS; SUBCUTANEOUS at 22:40

## 2021-12-28 RX ADMIN — Medication 60 MILLIGRAM(S): at 23:33

## 2021-12-28 RX ADMIN — HYDROMORPHONE HYDROCHLORIDE 1 MILLIGRAM(S): 2 INJECTION INTRAMUSCULAR; INTRAVENOUS; SUBCUTANEOUS at 10:21

## 2021-12-28 RX ADMIN — Medication 60 MILLIGRAM(S): at 11:40

## 2021-12-28 RX ADMIN — MORPHINE SULFATE 30 MILLIGRAM(S): 50 CAPSULE, EXTENDED RELEASE ORAL at 00:00

## 2021-12-28 RX ADMIN — HYDROMORPHONE HYDROCHLORIDE 1 MILLIGRAM(S): 2 INJECTION INTRAMUSCULAR; INTRAVENOUS; SUBCUTANEOUS at 11:10

## 2021-12-28 RX ADMIN — Medication 60 MILLIGRAM(S): at 06:16

## 2021-12-28 NOTE — PROGRESS NOTE ADULT - SUBJECTIVE AND OBJECTIVE BOX
[facility]  INFECTIOUS DISEASES AND INTERNAL MEDICINE at [place]  =======================================================  Warner Dale MD  Diplomates American Board of Internal Medicine and Infectious Diseases  Tel: [phone]      Fax: [phone] / [phone]  =======================================================    NATIVID[facility] MYERS [id number]    Follow up:  ? PNA on CT    No fever or chills  continues to be SOB    Allergies:  Perjeta (Other (Severe))  pertuzumab (Other (Severe))       REVIEW OF SYSTEMS:  CONSTITUTIONAL:  No Fever or chills  HEENT:  No diplopia or blurred vision.  No earache, sore throat or runny nose.  CARDIOVASCULAR:  No pressure, squeezing, strangling, tightness, heaviness or aching about the chest, neck, axilla or epigastrium.  RESPIRATORY:  No cough, + shortness of breath  GASTROINTESTINAL:  No nausea, vomiting or diarrhea.  GENITOURINARY:  No dysuria, frequency or urgency. No Blood in urine  MUSCULOSKELETAL:  no joint aches, no muscle pain  SKIN:  No change in skin, hair or nails.  NEUROLOGIC:  No Headaches, seizures  PSYCHIATRIC:  No disorder of thought or mood.  ENDOCRINE:  No heat or cold intolerance  HEMATOLOGICAL:  No easy bruising or bleeding.       Physical Exam:  GEN: NAD, pleasant  HEENT: normocephalic and atraumatic. EOMI. PERRL.  Anicteric  NECK: Supple.   LUNGS: Course BS B/L   HEART: Regular rate and rhythm   ABDOMEN: Soft, nontender, and nondistended.  Positive bowel sounds.    : No CVA tenderness  EXTREMITIES: Without any edema.  MSK: No joint swelling  NEUROLOGIC: No Focal Deficits  PSYCHIATRIC: Appropriate affect .  SKIN: No Rash      Vitals:  T(F): 98.2 (28 Dec 2021 10:15), Max: 98.9 (27 Dec 2021 20:00)  HR: 74 (28 Dec 2021 11:30)  BP: 128/80 (28 Dec 2021 11:30)  RR: 16 (28 Dec 2021 11:30)  SpO2: 97% (28 Dec 2021 11:30) (91% - 98%)  temp max in last 48H T(F): , Max: 98.9 (12-26-21 @ 16:50)      Current Antibiotics:    Other medications:  enoxaparin Injectable 40 milliGRAM(s) SubCutaneous daily  escitalopram 5 milliGRAM(s) Oral daily  methylPREDNISolone sodium succinate Injectable 60 milliGRAM(s) IV Push every 6 hours  modafinil 100 milliGRAM(s) Oral daily  morphine ER Tablet 30 milliGRAM(s) Oral three times a day  polyethylene glycol 3350 17 Gram(s) Oral daily  senna 2 Tablet(s) Oral at bedtime                            11.9   11.45 )-----------( 525      ( 28 Dec 2021 07:29 )             37.2     12-28    140  |  103  |  37.8<H>  ----------------------------<  146<H>  4.5   |  23.0  |  1.06    Ca    7.4<L>      28 Dec 2021 07:29  Mg     2.6     12-28    TPro  6.0<L>  /  Alb  3.0<L>  /  TBili  0.4  /  DBili  x   /  AST  35<H>  /  ALT  34<H>  /  AlkPhos  352<H>  12-28    RECENT CULTURES:  12-24 @ 00:25 .Body Fluid Pleural Fluid     Testing in progress  polymorphonuclear leukocytes seen  No organisms seen  by cytocentrifuge    12-23 @ 01:01 .Blood Blood-Peripheral     No growth at 5 days.    12-23 @ 01:00 .Blood Blood-Peripheral     No growth at 5 days.      WBC Count: 11.45 K/uL (12-28-21 @ 07:29)  WBC Count: 11.52 K/uL (12-27-21 @ 05:40)  WBC Count: 10.87 K/uL (12-26-21 @ 06:06)  WBC Count: 7.37 K/uL (12-25-21 @ 07:37)  WBC Count: 11.28 K/uL (12-24-21 @ 06:03)    Creatinine, Serum: 1.06 mg/dL (12-28-21 @ 07:29)  Creatinine, Serum: 1.26 mg/dL (12-27-21 @ 05:40)  Creatinine, Serum: 1.28 mg/dL (12-26-21 @ 06:11)  Creatinine, Serum: 1.48 mg/dL (12-25-21 @ 07:37)  Creatinine, Serum: 0.81 mg/dL (12-24-21 @ 06:01)    Procalcitonin, Serum: 0.09 ng/mL (12-23-21 @ 01:02)     COVID-19 PCR: NotDetec (12-27-21 @ 14:55)         < from: Xray Chest 1 View- PORTABLE-Routine (Xray Chest 1 View- PORTABLE-Routine in AM.) (12.25.21 @ 04:30) >  ACC: [id number] EXAM:  XR CHEST PORTABLE ROUTINE 1V                          PROCEDURE DATE:  12/25/2021      INTERPRETATION:  Portable chest radiograph    CLINICAL INFORMATION: LEFT chest tube. Follow-up    TECHNIQUE:  Portable  AP view of the chest.    COMPARISON: No previous examinations are available for review.    FINDINGS: Mediport catheter tip in SVC.  LEFT multi-sidehole pigtail catheter overlies LEFT lower hemithorax.  Unchanged bilateral  multifocal and diffuse ill-defined airspace   opacities..   No pneumothorax.    Heart size difficult to assess due to adjacent opacified parenchyma.    Visualized osseous structures are intact.    IMPRESSION:    Persistent bilateral  multifocal and diffuse ill-defined airspace   opacities..   LEFT multi-sidehole pigtail chest tube catheter in place..  --- End of Report ---  < end of copied text >

## 2021-12-28 NOTE — PROGRESS NOTE ADULT - PROBLEM SELECTOR PLAN 1
- 12/22 CT Angio chest showing stable small-moderate Left pleural effusion and small right pleural effusion with extensive lymphangitis metastases within the lungs, with atelectasis, pulm .  - Now s/p left pigtail 12/23  - Maintain left pigtail to suction per Dr. oSw.  - maintaining sat >90% on 14L 35% HFNC  - Plan of care Discussed with Dr. Sow.

## 2021-12-28 NOTE — PROGRESS NOTE ADULT - ASSESSMENT
36y  Female with h/o Metastatic Right Breast CA (Infiltrating Ductal Carcinoma with mets to the Spine and Liver), Malignant Pleural Effusion on Home O2 (3-5 liters), Pathologic Compression Fractures (Follows with Dr. Santana), Anxiety, and Muscle Spasms. Patient presents to the ED with complaints of worsening SOB. Patient was recently admitted in [facility] from 12/12-12/13 for SOB and found to have a malignant left sided chest tube requiring a chest tube. Patient states she was discharged on home O2 and was doing relatively well until today when she acutely became more dyspneic. Pulse ox was 84% on 5 liters and patient reports feeling an overwhelming feeling of doom and chest pressure. Denies any recent fevers or chills. Her son was home with a viral illness prior to presentation. CT angio was negative for PE,but revealed bilateral effusions (larger on the left), worsening lymphangitic spread and left sided opacities (Atelectasis vs PNA).  In the ED, patient was initially placed on BiPAP and seen by MICU who switched the patient to Hi-diana and recommended admission to step down unit. Patient seen and examined, reports SOB has improved since initial presentation but visibly appears anxious. Complaining of pleuritic right sided chest pain when coughing and chronic back pain for which she follows with Dr. Santana. Denies lightheadedness, dizziness, chest pain at this time, vomiting, abdominal pain or diarrhea. Otherwise, reports mild nausea and weakness. Was started on Vancomycin and Zosyn.      Acute hypoxic respiratory failure   Malignant Pleural Effusion  ? PNA on CT  Metastatic Right Breast CA  Immunosuppressed due to chemotherapy      - Blood cultures no growth   - Repeat blood cultures if febrile   - RVP/COVID 19 PCR negative   - CXR with left chest tube  - CT Chest reporting b/l effusions   - Procalcitonin level 0.09  - Monitor off antibiotics  - Follow up cultures  - Trend Fever  - Trend WBC      Will sign off. Please call PRN.     d/w Dr Cooper

## 2021-12-28 NOTE — PROGRESS NOTE ADULT - PROBLEM SELECTOR PLAN 2
- Multiple TTE revealing moderate pericardial effusion  - Small on CT scan 12/22.  - 12/27 pericardial effusion with tamponade seen on TTE  - 12/28 s/p pericardiocentesis with interventional cardiology  - post op MARYANN with resolution of pericardial effusion  - repeat TTE in 1 week per cards   - Plan of care Discussed with Dr. Sow. - Multiple TTE revealing moderate pericardial effusion  - Small on CT scan 12/22.  - 12/27 pericardial effusion with tamponade seen on TTE  - 12/28 s/p pericardiocentesis with interventional cardiology; no drain left in place  - post op MARYANN with resolution of pericardial effusion  - repeat TTE in 1 week per cards   - Plan of care Discussed with Dr. Sow.

## 2021-12-28 NOTE — PROGRESS NOTE ADULT - SUBJECTIVE AND OBJECTIVE BOX
[facility] Division of Hospital Medicine    Chief Complaint:  Shortness of breath    SUBJECTIVE / OVERNIGHT EVENTS: S/p pericardiocentesis today. Patient seen and examined. She reports she is a little sore after the procedure. Breathing is better.     Patient denies chest pain, SOB, abd pain, N/V, fever, chills, dysuria or any other complaints. All remainder ROS negative.     MEDICATIONS  (STANDING):  enoxaparin Injectable 40 milliGRAM(s) SubCutaneous daily  escitalopram 5 milliGRAM(s) Oral daily  methylPREDNISolone sodium succinate Injectable 60 milliGRAM(s) IV Push every 6 hours  modafinil 100 milliGRAM(s) Oral daily  morphine ER Tablet 30 milliGRAM(s) Oral three times a day  polyethylene glycol 3350 17 Gram(s) Oral daily  senna 2 Tablet(s) Oral at bedtime    MEDICATIONS  (PRN):  acetaminophen     Tablet .. 650 milliGRAM(s) Oral every 6 hours PRN Temp greater or equal to 38C (100.4F), Mild Pain (1 - 3)  albuterol/ipratropium for Nebulization 3 milliLiter(s) Nebulizer every 6 hours PRN Shortness of Breath and/or Wheezing  Carisoprodol 250 milliGRAM(s) 250 milliGRAM(s) Oral four times a day PRN muscle spasm  guaiFENesin Oral Liquid (Sugar-Free) 200 milliGRAM(s) Oral every 6 hours PRN Cough  HYDROmorphone  Injectable 0.5 milliGRAM(s) IV Push every 4 hours PRN Moderate Pain (4 - 6)  HYDROmorphone  Injectable 1 milliGRAM(s) IV Push every 4 hours PRN Severe Pain (7 - 10)  LORazepam   Injectable 0.5 milliGRAM(s) IV Push three times a day PRN Anxiety  promethazine 12.5 milliGRAM(s) Oral two times a day PRN nausea        I&O's Summary    27 Dec 2021 07:01  -  28 Dec 2021 07:00  --------------------------------------------------------  IN: 0 mL / OUT: 590 mL / NET: -590 mL    28 Dec 2021 07:01  -  28 Dec 2021 14:13  --------------------------------------------------------  IN: 480 mL / OUT: 550 mL / NET: -70 mL        PHYSICAL EXAM:  Vital Signs Last 24 Hrs  T(C): 36.8 (28 Dec 2021 10:15), Max: 37.2 (27 Dec 2021 20:00)  T(F): 98.2 (28 Dec 2021 10:15), Max: 98.9 (27 Dec 2021 20:00)  HR: 74 (28 Dec 2021 11:30) (68 - 86)  BP: 128/80 (28 Dec 2021 11:30) (120/80 - 146/74)  BP(mean): --  RR: 16 (28 Dec 2021 11:30) (12 - 20)  SpO2: 97% (28 Dec 2021 11:30) (91% - 98%)      CONSTITUTIONAL: NAD,   ENMT: Moist oral mucosa, no pharyngeal injection or exudates; normal dentition  RESPIRATORY: Normal respiratory effort; on high flow. left CT. Decreased breath sounds in the left lung base.   CARDIOVASCULAR: Regular rate and rhythm, normal S1 and S2, ; No lower extremity edema;  ABDOMEN: Nontender to palpation, normoactive bowel sounds, no rebound/guarding  MUSCLOSKELETAL: no clubbing or cyanosis of digits; no joint swelling or tenderness to palpation  PSYCH: A+O to person, place, and time; affect appropriate  NEUROLOGY: CN 2-12 are intact and symmetric; no gross sensory deficits;   SKIN: No rashes; no palpable lesions    LABS:                        11.9   11.45 )-----------( 525      ( 28 Dec 2021 07:29 )             37.2     12-28    140  |  103  |  37.8<H>  ----------------------------<  146<H>  4.5   |  23.0  |  1.06    Ca    7.4<L>      28 Dec 2021 07:29  Mg     2.6     12-28    TPro  6.0<L>  /  Alb  3.0<L>  /  TBili  0.4  /  DBili  x   /  AST  35<H>  /  ALT  34<H>  /  AlkPhos  352<H>  12-28              CAPILLARY BLOOD GLUCOSE            RADIOLOGY & ADDITIONAL TESTS:  Results Reviewed:   Imaging Personally Reviewed:  Electrocardiogram Personally Reviewed:

## 2021-12-28 NOTE — PROGRESS NOTE ADULT - ASSESSMENT
Procedure: S/P Pericardial centesis  Preprocedure Diagnosis: Pericardial effusion  Postprocedure Diagnosis: Pericardial effusion    Problem List:   1. Pericardial effusion, S/P Pericardial centesis  ·	Pericardial fluid for cell count, AFB, culture and gram stain, glucose, total protein, albumin, LD, Adenosine Diaminase  ·	Echo    2.      Procedure: S/P Pericardial centesis  Preprocedure Diagnosis: Pericardial effusion  Postprocedure Diagnosis: Pericardial effusion    Problem List:   1. Pericardial effusion, S/P Pericardial centesis  ·	Pericardial fluid for cell count, AFB, culture and gram stain, glucose, total protein, albumin, LD, Adenosine Diaminase  ·	Echo in 1 week  ·	Due to the risk of infection on chemotherapy, the pericardial drain was removed after the procedure.  ·	If pericardial effusion reoccurs the patient will need a pericardial window.

## 2021-12-28 NOTE — PROGRESS NOTE ADULT - ASSESSMENT
11/9/21, 10:24 AM EST    Patient goals/plan/ treatment preferences discussed by  and . Patient goals/plan/ treatment preferences reviewed with patient/ family. Patient/ family verbalize understanding of discharge plan and are in agreement with goal/plan/treatment preferences. Understanding was demonstrated using the teach back method. AVS provided by RN at time of discharge, which includes all necessary medical information pertaining to the patients current course of illness, treatment, post-discharge goals of care, and treatment preferences. Services After Discharge  Services At/After Discharge: Nursing Services, OT, PT [facility])   IMM Letter  IMM Letter date given[de-identified] 11/08/21       Patient to discharge to home with spouse. Patient and spouse requesting [facility] at discharge. They would like referral to [facility]. SW called and made referral to [facility] at [facility]. RN made aware. 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 Santana), Anxiety, and Muscle Spasms who presents to the ED with complaints of worsening SOB and found to have bilateral pleural effusions, left sided opacities and worsening lymphangitic spread of disease. [facility] pleural effusion drainage > 1lit. She was discharged home on 12/13 on 2-4 lit [place] oxygen. She had chest tube placement on 12/23 per thoracic sx service with 1lit drained. ECHO - showed moderate to severe pericardial effusion with cardiac tamponade physiology. this was discussed extensively with cardio, thoracic sx for possible pericardial window - but at this time, she is not deemed a good candidate for this.      Acute on Chronic Hypoxic Respiratory Failure likely due to Malignant exudative Effusions and worsening lymphangitic spread of disease; rule out Atypical PNA  by lights criteria effusion is Exudative   - s/p CT  -currently on Hi-José 35%   -CT angio negative for PE; bilateral pleural effusions (left > right), left sided opacities (atelectasis vs atypical PNA) and worsening lymphangitic spread of disease  -blood cultures negative  - Per ID dc abx as cultures are negative.   -Bronchodilators and cough expectorant as needed  -anxiolytics and analgesia as needed  -incentive spirometry  -Pulm following   -monitor respiratory status closely, prognosis guarded  - Onc consult appreciated.     Metastatic, Infiltrating Ductal Carcinoma of the Right Breast  -with liver and bone mets (pathologic, compression fractures of C3 and T2/T7)  -underwent immunotherapy on 12/2 and developed a hypersensitivity reaction  -was scheduled for chemo on 12/27 with Dr. Chapa ([facility]). Chemo ordered by Hem/onc Herceptin and Docetaxel to be given today. Discussed with Dr. Rivers.    Anxiety  -on Xanax at home  -will switch to IV ativan while inpatient  -continue escitalopram which was recently started    Back Pain due to Pathologic Compression Fractures  -known C3/T2 compression fractures  -CT angio with new T7 compression fracture with mild retropulsion   -Obtain dedicated imaging of T-spine  -MRIs reviewed from November 2021  -ortho spine consulted (Follows with Dr. Santana as outpatient)  -f/u  CT and MRI spine   -Continue Morphine ER 30 mg TID  -IV dilaudid for mod/severe pain as needed  -Continue muscle relaxer - on carisoprodol as needed    Moderate Pericardial Effusion  -likely malignant  -TNI negative; BNP within normal range  -patient reports effusion was small on recent TTE at [facility] on 12/12 or 12/13  -patient was seen by [facility] cardio at that time  - TTE - here - moderate pericardial effusion - possible early cardiac tamponade physiology - not a suitable candidate for pericardial window placement at this time per thoracic Sx.    - limited echos today to eval size per Cardiology. Pending results may need pericardiocentesis.     KURT, improved  - encouraged hydration, Will hold off on IVFs for now to prevent reaccumulation of pleural effusion    Constipation  - senna and Miralax    DVT ppx Lovenox  Dispo: remains acute on IV abx and Chest tube.  and Tele bed.   Discussed with Hem/onc and Cardiology.    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 Santana), Anxiety, and Muscle Spasms who presents to the ED with complaints of worsening SOB and found to have bilateral pleural effusions, left sided opacities and worsening lymphangitic spread of disease. Reports recent admission 12/3 for same at [facility] - had CT pleural effusion drainage > 1lit. She was discharged home on 12/13 on 2-4 lit [place] oxygen. She had chest tube placement on 12/23 per thoracic sx service with 1lit drained. ECHO - showed moderate to severe pericardial effusion with cardiac tamponade physiology. this was discussed extensively with cardio, thoracic sx for possible pericardial window - but at this time, she is not deemed a good candidate for this.      Acute on Chronic Hypoxic Respiratory Failure likely due to Malignant exudative Effusions and worsening lymphangitic spread of disease; rule out Atypical PNA  by lights criteria effusion is Exudative   - s/p CT  -currently on Hi-José 35%   -CT angio negative for PE; bilateral pleural effusions (left > right), left sided opacities (atelectasis vs atypical PNA) and worsening lymphangitic spread of disease  -blood cultures negative  - Per ID dc abx as cultures are negative.   -Bronchodilators and cough expectorant as needed  -anxiolytics and analgesia as needed  -incentive spirometry  -Pulm following   -monitor respiratory status closely, prognosis guarded  - Onc consult appreciated.     Metastatic, Infiltrating Ductal Carcinoma of the Right Breast  -with liver and bone mets (pathologic, compression fractures of C3 and T2/T7)  -underwent immunotherapy on 12/2 and developed a hypersensitivity reaction  -was scheduled for chemo on 12/27 with Dr. Chapa ([facility]).   - s/p inpatient Chemo Herceptin and Docetaxel on 12/27.     Anxiety  -on Xanax at home  -Ativan prn  -continue escitalopram which was recently started    Back Pain due to Pathologic Compression Fractures  -known C3/T2 compression fractures  -CT angio with new T7 compression fracture with mild retropulsion   -Obtain dedicated imaging of T-spine  -MRIs reviewed from November 2021  -ortho spine consulted (Follows with Dr. Santana as outpatient)  -f/u  CT and MRI spine   -Continue Morphine ER 30 mg TID  -IV dilaudid for mod/severe pain as needed  -Continue muscle relaxer - on carisoprodol as needed    Moderate Pericardial Effusion  -likely malignant  -TNI negative; BNP within normal range  -patient reports effusion was small on recent TTE at [facility] on 12/12 or 12/13  -patient was seen by [facility] cardio at that time  - TTE - here - moderate pericardial effusion - possible early cardiac tamponade physiology - not a suitable candidate for pericardial window placement at this time per thoracic Sx.    - limited echos today to eval size per Cardiology. Pending results may need pericardiocentesis.     KURT, improved  - encouraged hydration, Will hold off on IVFs for now to prevent reaccumulation of pleural effusion    Constipation  - senna and Miralax    DVT ppx Lovenox  Dispo: remains acute on IV abx and Chest tube.  and Tele bed.   Discussed with Hem/onc and Cardiology.    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 Santana), Anxiety, and Muscle Spasms who presents to the ED with complaints of worsening SOB and found to have bilateral pleural effusions, left sided opacities and worsening lymphangitic spread of disease. Reports recent admission 12/3 for same at [facility] - had CT pleural effusion drainage > 1lit. She was discharged home on 12/13 on 2-4 lit [place] oxygen. She had chest tube placement on 12/23 per thoracic sx service with 1lit drained. ECHO - showed moderate to severe pericardial effusion with cardiac tamponade physiology. this was discussed extensively with cardio, thoracic sx for possible pericardial window - but at this time, she is not deemed a good candidate for this. Repeat Echo showed persistent pericardial effusion. S/ pericardiocentesis on 12/28. Patient also received inpatient  Chemo on 12/27.     Acute on Chronic Hypoxic Respiratory Failure likely due to Malignant exudative Effusions and worsening lymphangitic spread of disease; rule out Atypical PNA  by lights criteria effusion is Exudative   - s/p CT  -currently on Hi-José 35%   -CT angio negative for PE; bilateral pleural effusions (left > right), left sided opacities (atelectasis vs atypical PNA) and worsening lymphangitic spread of disease  -blood cultures negative  - Per ID dc abx as cultures are negative.   -Bronchodilators and cough expectorant as needed  -anxiolytics and analgesia as needed  -incentive spirometry  -Pulm following   -monitor respiratory status closely, prognosis guarded  - Onc consult appreciated.   - Wean Oxygen as able  - Incentive spirometer.     Metastatic, Infiltrating Ductal Carcinoma of the Right Breast  -with liver and bone mets (pathologic, compression fractures of C3 and T2/T7)  -underwent immunotherapy on 12/2 and developed a hypersensitivity reaction  -was scheduled for chemo on 12/27 with Dr. Chapa ([facility]).   - s/p inpatient Chemo Herceptin and Docetaxel on 12/27.     Anxiety  -on Xanax at home  -Ativan prn  -continue escitalopram which was recently started    Back Pain due to Pathologic Compression Fractures  -known C3/T2 compression fractures  -CT angio with new T7 compression fracture with mild retropulsion   -Obtain dedicated imaging of T-spine  -MRIs reviewed from November 2021  -ortho spine consulted (Follows with Dr. Santana as outpatient)  -f/u  CT and MRI spine pending. Will discuss Ortho if still needed  -Continue Morphine ER 30 mg TID  -IV dilaudid for mod/severe pain as needed  -Continue muscle relaxer - on carisoprodol as needed    Moderate Pericardial Effusion  -likely malignant  -TNI negative; BNP within normal range  -patient reports effusion was small on recent TTE at [facility] on 12/12 or 12/13  -patient was seen by [facility] cardio at that time  - TTE - here - moderate pericardial effusion - possible early cardiac tamponade physiology - not a suitable candidate for pericardial window placement at this time per thoracic Sx.    - s/p pericardiocentesis on 12/28, 250cc serous fluid removed.  - will need repeat echo in 1 week    KURT, resolved  - encouraged hydration, Will hold off on IVFs for now to prevent reaccumulation of pleural effusion    Constipation  - senna and Miralax    DVT ppx Lovenox  Dispo: remains acute on high flow and with Chest tube.

## 2021-12-28 NOTE — PROGRESS NOTE ADULT - SUBJECTIVE AND OBJECTIVE BOX
Subjective: Patient states breathing has not changed after pericardiocentesis, however complaints of soreness at insertion site. At this time denies CP, palpitations, SOB, cough, fever, chills, itchiness/rash, diaphoresis, vision changes, HA, dizziness/lightheadedness, numbness/tingling, abd pain, N/V.      T(C): 36.8 (12-28-21 @ 10:15), Max: 37.2 (12-27-21 @ 20:00)  HR: 74 (12-28-21 @ 11:30) (68 - 86)  BP: 128/80 (12-28-21 @ 11:30) (120/80 - 146/74)  RR: 16 (12-28-21 @ 11:30) (12 - 20)  SpO2: 97% (12-28-21 @ 11:30) (91% - 98%)    I&O's Detail    27 Dec 2021 07:01  -  28 Dec 2021 07:00  --------------------------------------------------------  IN:  Total IN: 0 mL    OUT:    Chest Tube (mL): 240 mL    Voided (mL): 350 mL  Total OUT: 590 mL    Total NET: -590 mL    Patient's  laboratory results and current medications reviewed.    Physical Exam:  Constitutional: NAD  Neuro: A+O x 3, non-focal, speech clear and intact  HEENT: NC/AT  CV: +S1S2, no murmurs or rub  Pulm/chest: lung sounds CTA and equal bilaterally, no accessory muscle use noted  Abd: +BS, soft, NT, ND  Ext: DENNEY x 4, no edema  Skin: warm, well perfused  Psych: calm, appropriate affect  Drain: Left pigtail catheter c/d/i, no air leak, no SQ air    Today's CXR: PENDING READ     Subjective: Patient states breathing has not changed after pericardiocentesis, however complaints of soreness at insertion site. At this time denies CP, palpitations, SOB, cough, fever, chills, itchiness/rash, diaphoresis, vision changes, HA, dizziness/lightheadedness, numbness/tingling, abd pain, N/V.      T(C): 36.8 (12-28-21 @ 10:15), Max: 37.2 (12-27-21 @ 20:00)  HR: 74 (12-28-21 @ 11:30) (68 - 86)  BP: 128/80 (12-28-21 @ 11:30) (120/80 - 146/74)  RR: 16 (12-28-21 @ 11:30) (12 - 20)  SpO2: 97% (12-28-21 @ 11:30) (91% - 98%)    I&O's Detail    27 Dec 2021 07:01  -  28 Dec 2021 07:00  --------------------------------------------------------  IN:  Total IN: 0 mL    OUT:    Chest Tube (mL): 240 mL    Voided (mL): 350 mL  Total OUT: 590 mL    Total NET: -590 mL    Patient's  laboratory results and current medications reviewed.    Physical Exam:  Constitutional: NAD  Neuro: A+O x 3, non-focal, speech clear and intact  HEENT: NC/AT  CV: +S1S2, no murmurs or rub  Pulm/chest: lung sounds CTA and equal bilaterally, no accessory muscle use noted  Abd: +BS, soft, NT, ND  Ext: DENNEY x 4, no edema  Skin: mediastinal puncture site c/d/i, Right groin puncture site c/d/i, warm, well perfused  Psych: calm, appropriate affect  Drain: Left pigtail catheter c/d/i, no air leak, no SQ air  Lines: Right chest wall infusion port    Today's CXR: PENDING READ

## 2021-12-28 NOTE — PROGRESS NOTE ADULT - SUBJECTIVE AND OBJECTIVE BOX
Department of Cardiology                                                                  [facility]/[place] [address] [address]                                                            Telephone: [phone]. Fax:[phone]                                                                                         PERICARDIAL CENTESIS NOTE         36y Female S/P pericardial centesis for   Right Heart Pressures:       RA:        RV:        PA:        PCWP:        CO:        CI:        SVR:        PVR:        :        Danitza:     Interval Note:     HPI: Patient is a 36 year old female with recently diagnosed Metastatic Right Breast CA (Infiltrating Ductal Carcinoma with mets to the Spine and Liver), Malignant Pleural Effusion on Home O2 (3-5 liters), Pathologic Compression Fractures (Follows with Dr. Santana), Anxiety, and Muscle Spasms who presents to the ED with complaints of worsening SOB. Patient was recently admitted in [facility] from 12/12-12/13 for SOB and found to have a malignant left sided chest tube requiring a chest tube. Patient states she was discharged on home O2 and was doing relatively well until today when she acutely became more dyspneic. Pulse ox was 84% on 5 liters and patient reports feeling an overwhelming feeling of doom and chest pressure. Denies any recent fevers or chills, but does report a productive cough (intermittently yellow). CT angio was negative for PE, but revealed bilateral effusions (larger on the left), worsening lymphangitic spread and left sided opacities (Atelectasis vs PNA).  In the ED, patient was initially placed on bipap and seen by MICU who switched the patient to Hi-diana and recommended admission to step down unit. Patient seen and examined, reports SOB has improved since initial presentation but visibly appears anxious. Complaining of pleuritic right sided chest pain when coughing and chronic back pain for which she follows with Dr. Santana. Denies lightheadedness, dizziness, chest pain at this time, vomiting, abdominal pain or diarrhea. Otherwise, reports mild nausea and weakness.  (22 Dec 2021 23:55)    T(C): 37 (12-28-21 @ 04:00), Max: 37.2 (12-27-21 @ 20:00)  HR: 78 (12-28-21 @ 04:00)  BP: 127/78 (12-28-21 @ 04:00)  RR: 19 (12-28-21 @ 04:00)  SpO2: 94% (12-28-21 @ 04:00)    Allergies:   ·	Perjeta (Other (Severe))  ·	pertuzumab (Other (Severe))    Home Medications:  Albuterol (Eqv-Proventil HFA) 90 mcg/inh inhalation aerosol: 2 puff(s) inhaled every 6 hours, As Needed shortness of breath (22 Dec 2021 23:19)  albuterol 0.63 mg/3 mL (0.021%) inhalation solution: 3 milliliter(s) inhaled 3 times a day, As Needed (23 Dec 2021 00:55)  ALPRAZolam 0.5 mg oral tablet: 1 tab(s) orally every 8 hours, As Needed for anxiety (22 Dec 2021 23:19)  carisoprodol 250 mg oral tablet: 1 tab(s) orally 4 times a day, As Needed for muscle spasm (22 Dec 2021 23:19)  dexamethasone 4 mg oral tablet: Take 1 tablet by mouth on the everning of chemotherapy,  1 tablet by mouth twice a day on the day before chemotherapy , and twice a day the day after chemotherapy   (22 Dec 2021 23:19)  escitalopram 5 mg oral tablet: 1 tab(s) orally once a day (22 Dec 2021 23:19)  HYDROmorphone 4 mg oral tablet: 1 tab(s) orally every 6 hours, As Needed for pain (22 Dec 2021 23:19)  modafinil 100 mg oral tablet: 1 tab(s) orally once a day (in the morning) (22 Dec 2021 23:19)  morphine 30 mg/8 to 12 hr oral tablet, extended release: 1 tab(s) orally every 8 hours (22 Dec 2021 23:19)  promethazine 12.5 mg oral tablet: 1 tab(s) orally 2 times a day (22 Dec 2021 23:19)    Physical Examination:   General: Awake, alert, oriented, no acute distress.  Chest: CTA, S1, S2, no murmur, chest site clean, dry intact.  Neuro: A & O X 3  Extremities: No edema                          11.9   11.45 )-----------( 525      ( 28 Dec 2021 07:29 )             37.2     12-27    137  |  101  |  30.7  ----------------------------<  159  4.8   |  24.0  |  1.26    Ca    8.7      27 Dec 2021 05:40  Mg     2.6     12-27    TPro  6.2  /  Alb  3.0  /  TBili  0.4  /  DBili  x   /  AST  27  /  ALT  24  /  AlkPhos  390  12-27                                                                              Department of Cardiology                                                                  [facility]/[place] [address] [address] [facility]                                                            Telephone: [phone]. Fax:[phone]                                                                                         PERICARDIAL CENTESIS NOTE         36y Female S/P pericardial centesis for 260 mL serous fluid.  Right Heart Pressures:       RA: 8       RV: 46/17       PA: 35/14/24       PCWP: 16    Interval Note: The patient had a RHC and pericardial centesis.     HPI: Patient is a 36 year old female with recently diagnosed Metastatic Right Breast CA (Infiltrating Ductal Carcinoma with mets to the Spine and Liver), Malignant Pleural Effusion on Home O2 (3-5 liters), Pathologic Compression Fractures (Follows with Dr. Santana), Anxiety, and Muscle Spasms who presents to the ED with complaints of worsening SOB. Patient was recently admitted in [facility] from 12/12-12/13 for SOB and found to have a malignant left sided chest tube requiring a chest tube. Patient states she was discharged on home O2 and was doing relatively well until today when she acutely became more dyspneic. Pulse ox was 84% on 5 liters and patient reports feeling an overwhelming feeling of doom and chest pressure. Denies any recent fevers or chills, but does report a productive cough (intermittently yellow). CT angio was negative for PE, but revealed bilateral effusions (larger on the left), worsening lymphangitic spread and left sided opacities (Atelectasis vs PNA).  In the ED, patient was initially placed on bipap and seen by MICU who switched the patient to Hi-diana and recommended admission to step down unit. Patient seen and examined, reports SOB has improved since initial presentation but visibly appears anxious. Complaining of pleuritic right sided chest pain when coughing and chronic back pain for which she follows with Dr. Santana. Denies lightheadedness, dizziness, chest pain at this time, vomiting, abdominal pain or diarrhea. Otherwise, reports mild nausea and weakness.  (22 Dec 2021 23:55)    T(C): 37 (12-28-21 @ 04:00), Max: 37.2 (12-27-21 @ 20:00)  HR: 78 (12-28-21 @ 04:00)  BP: 127/78 (12-28-21 @ 04:00)  RR: 19 (12-28-21 @ 04:00)  SpO2: 94% (12-28-21 @ 04:00)    Allergies:   ·	Perjeta (Other (Severe))  ·	pertuzumab (Other (Severe))    Home Medications:  Albuterol (Eqv-Proventil HFA) 90 mcg/inh inhalation aerosol: 2 puff(s) inhaled every 6 hours, As Needed shortness of breath (22 Dec 2021 23:19)  albuterol 0.63 mg/3 mL (0.021%) inhalation solution: 3 milliliter(s) inhaled 3 times a day, As Needed (23 Dec 2021 00:55)  ALPRAZolam 0.5 mg oral tablet: 1 tab(s) orally every 8 hours, As Needed for anxiety (22 Dec 2021 23:19)  carisoprodol 250 mg oral tablet: 1 tab(s) orally 4 times a day, As Needed for muscle spasm (22 Dec 2021 23:19)  dexamethasone 4 mg oral tablet: Take 1 tablet by mouth on the everning of chemotherapy,  1 tablet by mouth twice a day on the day before chemotherapy , and twice a day the day after chemotherapy   (22 Dec 2021 23:19)  escitalopram 5 mg oral tablet: 1 tab(s) orally once a day (22 Dec 2021 23:19)  HYDROmorphone 4 mg oral tablet: 1 tab(s) orally every 6 hours, As Needed for pain (22 Dec 2021 23:19)  modafinil 100 mg oral tablet: 1 tab(s) orally once a day (in the morning) (22 Dec 2021 23:19)  morphine 30 mg/8 to 12 hr oral tablet, extended release: 1 tab(s) orally every 8 hours (22 Dec 2021 23:19)  promethazine 12.5 mg oral tablet: 1 tab(s) orally 2 times a day (22 Dec 2021 23:19)    Physical Examination:   General: Awake, alert, oriented, no acute distress.  Chest: CTA, S1, S2, no murmur, chest site clean, dry intact.  Neuro: A & O X 3  Extremities: No edema                          11.9   11.45 )-----------( 525      ( 28 Dec 2021 07:29 )             37.2     12-27    137  |  101  |  30.7  ----------------------------<  159  4.8   |  24.0  |  1.26    Ca    8.7      27 Dec 2021 05:40  Mg     2.6     12-27    TPro  6.2  /  Alb  3.0  /  TBili  0.4  /  DBili  x   /  AST  27  /  ALT  24  /  AlkPhos  390  12-27                                                                              Department of Cardiology                                                                  [facility]/[place] [address] [address] [facility]                                                            Telephone: [phone]. Fax:[phone]                                                                                         PERICARDIAL CENTESIS NOTE         36y Female S/P pericardial centesis for 260 mL serous fluid.  Right Heart Pressures:       RA: 8       RV: 46/17       PA: 35/14/24       PCWP: 16    Interval Note: The patient had a RHC and pericardial centesis.     HPI: Patient is a 36 year old female with recently diagnosed Metastatic Right Breast CA (Infiltrating Ductal Carcinoma with mets to the Spine and Liver), Malignant Pleural Effusion on Home O2 (3-5 liters), Pathologic Compression Fractures (Follows with Dr. Santana), Anxiety, and Muscle Spasms who presents to the ED with complaints of worsening SOB. Patient was recently admitted in [facility] from 12/12-12/13 for SOB and found to have a malignant left sided chest tube requiring a chest tube. Patient states she was discharged on home O2 and was doing relatively well until today when she acutely became more dyspneic. Pulse ox was 84% on 5 liters and patient reports feeling an overwhelming feeling of doom and chest pressure. Denies any recent fevers or chills, but does report a productive cough (intermittently yellow). CT angio was negative for PE, but revealed bilateral effusions (larger on the left), worsening lymphangitic spread and left sided opacities (Atelectasis vs PNA).  In the ED, patient was initially placed on bipap and seen by MICU who switched the patient to Hi-diana and recommended admission to step down unit. Patient seen and examined, reports SOB has improved since initial presentation but visibly appears anxious. Complaining of pleuritic right sided chest pain when coughing and chronic back pain for which she follows with Dr. Santana. Denies lightheadedness, dizziness, chest pain at this time, vomiting, abdominal pain or diarrhea. Otherwise, reports mild nausea and weakness.  (22 Dec 2021 23:55)    T(C): 37 (12-28-21 @ 04:00), Max: 37.2 (12-27-21 @ 20:00)  HR: 78 (12-28-21 @ 04:00)  BP: 127/78 (12-28-21 @ 04:00)  RR: 19 (12-28-21 @ 04:00)  SpO2: 94% (12-28-21 @ 04:00)    Allergies:   ·	Perjeta (Other (Severe))  ·	pertuzumab (Other (Severe))    Home Medications:  Albuterol (Eqv-Proventil HFA) 90 mcg/inh inhalation aerosol: 2 puff(s) inhaled every 6 hours, As Needed shortness of breath (22 Dec 2021 23:19)  albuterol 0.63 mg/3 mL (0.021%) inhalation solution: 3 milliliter(s) inhaled 3 times a day, As Needed (23 Dec 2021 00:55)  ALPRAZolam 0.5 mg oral tablet: 1 tab(s) orally every 8 hours, As Needed for anxiety (22 Dec 2021 23:19)  carisoprodol 250 mg oral tablet: 1 tab(s) orally 4 times a day, As Needed for muscle spasm (22 Dec 2021 23:19)  dexamethasone 4 mg oral tablet: Take 1 tablet by mouth on the everning of chemotherapy,  1 tablet by mouth twice a day on the day before chemotherapy , and twice a day the day after chemotherapy   (22 Dec 2021 23:19)  escitalopram 5 mg oral tablet: 1 tab(s) orally once a day (22 Dec 2021 23:19)  HYDROmorphone 4 mg oral tablet: 1 tab(s) orally every 6 hours, As Needed for pain (22 Dec 2021 23:19)  modafinil 100 mg oral tablet: 1 tab(s) orally once a day (in the morning) (22 Dec 2021 23:19)  morphine 30 mg/8 to 12 hr oral tablet, extended release: 1 tab(s) orally every 8 hours (22 Dec 2021 23:19)  promethazine 12.5 mg oral tablet: 1 tab(s) orally 2 times a day (22 Dec 2021 23:19)    Physical Examination:   General: Awake, alert, oriented, no acute distress.  Chest: CTA, S1, S2, no murmur, chest site clean, dry intact. Left chest tube in place.  Neuro: A & O X 3  Right Groin: Soft, no bleeding, no hematoma  Extremities: No edema                          11.9   11.45 )-----------( 525      ( 28 Dec 2021 07:29 )             37.2     12-27    137  |  101  |  30.7  ----------------------------<  159  4.8   |  24.0  |  1.26    Ca    8.7      27 Dec 2021 05:40  Mg     2.6     12-27    TPro  6.2  /  Alb  3.0  /  TBili  0.4  /  DBili  x   /  AST  27  /  ALT  24  /  AlkPhos  390  12-27

## 2021-12-29 LAB
ANION GAP SERPL CALC-SCNC: 11 MMOL/L — SIGNIFICANT CHANGE UP (ref 5–17)
BUN SERPL-MCNC: 34.1 MG/DL — HIGH (ref 8–20)
CALCIUM SERPL-MCNC: 7.3 MG/DL — LOW (ref 8.6–10.2)
CHLORIDE SERPL-SCNC: 106 MMOL/L — SIGNIFICANT CHANGE UP (ref 98–107)
CO2 SERPL-SCNC: 24 MMOL/L — SIGNIFICANT CHANGE UP (ref 22–29)
CREAT SERPL-MCNC: 0.82 MG/DL — SIGNIFICANT CHANGE UP (ref 0.5–1.3)
GLUCOSE SERPL-MCNC: 151 MG/DL — HIGH (ref 70–99)
HCT VFR BLD CALC: 36 % — SIGNIFICANT CHANGE UP (ref 34.5–45)
HGB BLD-MCNC: 11.6 G/DL — SIGNIFICANT CHANGE UP (ref 11.5–15.5)
MAGNESIUM SERPL-MCNC: 2.6 MG/DL — SIGNIFICANT CHANGE UP (ref 1.6–2.6)
MCHC RBC-ENTMCNC: 27.2 PG — SIGNIFICANT CHANGE UP (ref 27–34)
MCHC RBC-ENTMCNC: 32.2 GM/DL — SIGNIFICANT CHANGE UP (ref 32–36)
MCV RBC AUTO: 84.3 FL — SIGNIFICANT CHANGE UP (ref 80–100)
PLATELET # BLD AUTO: 465 K/UL — HIGH (ref 150–400)
POTASSIUM SERPL-MCNC: 4.8 MMOL/L — SIGNIFICANT CHANGE UP (ref 3.5–5.3)
POTASSIUM SERPL-SCNC: 4.8 MMOL/L — SIGNIFICANT CHANGE UP (ref 3.5–5.3)
RBC # BLD: 4.27 M/UL — SIGNIFICANT CHANGE UP (ref 3.8–5.2)
RBC # FLD: 13.6 % — SIGNIFICANT CHANGE UP (ref 10.3–14.5)
SODIUM SERPL-SCNC: 141 MMOL/L — SIGNIFICANT CHANGE UP (ref 135–145)
WBC # BLD: 8.38 K/UL — SIGNIFICANT CHANGE UP (ref 3.8–10.5)
WBC # FLD AUTO: 8.38 K/UL — SIGNIFICANT CHANGE UP (ref 3.8–10.5)

## 2021-12-29 PROCEDURE — 99231 SBSQ HOSP IP/OBS SF/LOW 25: CPT

## 2021-12-29 PROCEDURE — 71045 X-RAY EXAM CHEST 1 VIEW: CPT | Mod: 26,59

## 2021-12-29 PROCEDURE — 72125 CT NECK SPINE W/O DYE: CPT | Mod: 26

## 2021-12-29 PROCEDURE — 71045 X-RAY EXAM CHEST 1 VIEW: CPT | Mod: 26,77

## 2021-12-29 PROCEDURE — 72131 CT LUMBAR SPINE W/O DYE: CPT | Mod: 26

## 2021-12-29 PROCEDURE — 99233 SBSQ HOSP IP/OBS HIGH 50: CPT

## 2021-12-29 RX ORDER — ONDANSETRON 8 MG/1
4 TABLET, FILM COATED ORAL EVERY 6 HOURS
Refills: 0 | Status: DISCONTINUED | OUTPATIENT
Start: 2021-12-29 | End: 2022-01-03

## 2021-12-29 RX ORDER — HYDROMORPHONE HYDROCHLORIDE 2 MG/ML
0.5 INJECTION INTRAMUSCULAR; INTRAVENOUS; SUBCUTANEOUS ONCE
Refills: 0 | Status: DISCONTINUED | OUTPATIENT
Start: 2021-12-29 | End: 2021-12-29

## 2021-12-29 RX ORDER — PANTOPRAZOLE SODIUM 20 MG/1
40 TABLET, DELAYED RELEASE ORAL
Refills: 0 | Status: DISCONTINUED | OUTPATIENT
Start: 2021-12-29 | End: 2022-01-03

## 2021-12-29 RX ADMIN — HYDROMORPHONE HYDROCHLORIDE 0.5 MILLIGRAM(S): 2 INJECTION INTRAMUSCULAR; INTRAVENOUS; SUBCUTANEOUS at 00:34

## 2021-12-29 RX ADMIN — Medication 60 MILLIGRAM(S): at 06:29

## 2021-12-29 RX ADMIN — MORPHINE SULFATE 30 MILLIGRAM(S): 50 CAPSULE, EXTENDED RELEASE ORAL at 07:00

## 2021-12-29 RX ADMIN — HYDROMORPHONE HYDROCHLORIDE 0.5 MILLIGRAM(S): 2 INJECTION INTRAMUSCULAR; INTRAVENOUS; SUBCUTANEOUS at 21:36

## 2021-12-29 RX ADMIN — HYDROMORPHONE HYDROCHLORIDE 0.5 MILLIGRAM(S): 2 INJECTION INTRAMUSCULAR; INTRAVENOUS; SUBCUTANEOUS at 21:06

## 2021-12-29 RX ADMIN — MORPHINE SULFATE 30 MILLIGRAM(S): 50 CAPSULE, EXTENDED RELEASE ORAL at 22:28

## 2021-12-29 RX ADMIN — HYDROMORPHONE HYDROCHLORIDE 1 MILLIGRAM(S): 2 INJECTION INTRAMUSCULAR; INTRAVENOUS; SUBCUTANEOUS at 18:17

## 2021-12-29 RX ADMIN — HYDROMORPHONE HYDROCHLORIDE 1 MILLIGRAM(S): 2 INJECTION INTRAMUSCULAR; INTRAVENOUS; SUBCUTANEOUS at 10:10

## 2021-12-29 RX ADMIN — MORPHINE SULFATE 30 MILLIGRAM(S): 50 CAPSULE, EXTENDED RELEASE ORAL at 06:29

## 2021-12-29 RX ADMIN — Medication 12.5 MILLIGRAM(S): at 13:09

## 2021-12-29 RX ADMIN — MODAFINIL 100 MILLIGRAM(S): 200 TABLET ORAL at 12:03

## 2021-12-29 RX ADMIN — Medication 60 MILLIGRAM(S): at 12:04

## 2021-12-29 RX ADMIN — MORPHINE SULFATE 30 MILLIGRAM(S): 50 CAPSULE, EXTENDED RELEASE ORAL at 21:58

## 2021-12-29 RX ADMIN — HYDROMORPHONE HYDROCHLORIDE 1 MILLIGRAM(S): 2 INJECTION INTRAMUSCULAR; INTRAVENOUS; SUBCUTANEOUS at 07:00

## 2021-12-29 RX ADMIN — ONDANSETRON 4 MILLIGRAM(S): 8 TABLET, FILM COATED ORAL at 23:39

## 2021-12-29 RX ADMIN — ENOXAPARIN SODIUM 40 MILLIGRAM(S): 100 INJECTION SUBCUTANEOUS at 12:03

## 2021-12-29 RX ADMIN — HYDROMORPHONE HYDROCHLORIDE 1 MILLIGRAM(S): 2 INJECTION INTRAMUSCULAR; INTRAVENOUS; SUBCUTANEOUS at 09:43

## 2021-12-29 RX ADMIN — HYDROMORPHONE HYDROCHLORIDE 1 MILLIGRAM(S): 2 INJECTION INTRAMUSCULAR; INTRAVENOUS; SUBCUTANEOUS at 13:57

## 2021-12-29 RX ADMIN — Medication 60 MILLIGRAM(S): at 18:16

## 2021-12-29 RX ADMIN — ONDANSETRON 4 MILLIGRAM(S): 8 TABLET, FILM COATED ORAL at 14:45

## 2021-12-29 RX ADMIN — ESCITALOPRAM OXALATE 5 MILLIGRAM(S): 10 TABLET, FILM COATED ORAL at 12:04

## 2021-12-29 RX ADMIN — Medication 60 MILLIGRAM(S): at 23:39

## 2021-12-29 RX ADMIN — MORPHINE SULFATE 30 MILLIGRAM(S): 50 CAPSULE, EXTENDED RELEASE ORAL at 15:15

## 2021-12-29 RX ADMIN — MORPHINE SULFATE 30 MILLIGRAM(S): 50 CAPSULE, EXTENDED RELEASE ORAL at 14:37

## 2021-12-29 RX ADMIN — HYDROMORPHONE HYDROCHLORIDE 1 MILLIGRAM(S): 2 INJECTION INTRAMUSCULAR; INTRAVENOUS; SUBCUTANEOUS at 14:20

## 2021-12-29 RX ADMIN — Medication 650 MILLIGRAM(S): at 00:34

## 2021-12-29 RX ADMIN — HYDROMORPHONE HYDROCHLORIDE 1 MILLIGRAM(S): 2 INJECTION INTRAMUSCULAR; INTRAVENOUS; SUBCUTANEOUS at 22:35

## 2021-12-29 RX ADMIN — HYDROMORPHONE HYDROCHLORIDE 1 MILLIGRAM(S): 2 INJECTION INTRAMUSCULAR; INTRAVENOUS; SUBCUTANEOUS at 23:05

## 2021-12-29 RX ADMIN — HYDROMORPHONE HYDROCHLORIDE 1 MILLIGRAM(S): 2 INJECTION INTRAMUSCULAR; INTRAVENOUS; SUBCUTANEOUS at 04:31

## 2021-12-29 NOTE — PROGRESS NOTE ADULT - PROBLEM SELECTOR PLAN 1
s/p left pigtail on 12/23/21  Maintain left pigtail to suction as per Dr. Sow.  Encourage the use of I/S, CDBE, OOB to chair, daily PT, chest PT  Continue Duonebs  Repeat chest xray tomorrow am  Maintain oxygen sat >90%, remains on 6L  Plan for pleurex catheter prior to discharge to home  Discussed plan with Dr. Sow & Thoracic surgery in am rounds.

## 2021-12-29 NOTE — PROGRESS NOTE ADULT - SUBJECTIVE AND OBJECTIVE BOX
[facility] Division of Hospital Medicine    Chief Complaint:  Shortness of breath    SUBJECTIVE / OVERNIGHT EVENTS: Patient seen and examined. Now on 6L NC. Reports breathing is better. Pain controlled. Having Bms.     Patient denies chest pain, SOB, abd pain, N/V, fever, chills, dysuria or any other complaints. All remainder ROS negative.     MEDICATIONS  (STANDING):  enoxaparin Injectable 40 milliGRAM(s) SubCutaneous daily  escitalopram 5 milliGRAM(s) Oral daily  methylPREDNISolone sodium succinate Injectable 60 milliGRAM(s) IV Push every 6 hours  modafinil 100 milliGRAM(s) Oral daily  morphine ER Tablet 30 milliGRAM(s) Oral three times a day  pantoprazole    Tablet 40 milliGRAM(s) Oral before breakfast  polyethylene glycol 3350 17 Gram(s) Oral daily  senna 2 Tablet(s) Oral at bedtime    MEDICATIONS  (PRN):  acetaminophen     Tablet .. 650 milliGRAM(s) Oral every 6 hours PRN Temp greater or equal to 38C (100.4F), Mild Pain (1 - 3)  albuterol/ipratropium for Nebulization 3 milliLiter(s) Nebulizer every 6 hours PRN Shortness of Breath and/or Wheezing  Carisoprodol 250 milliGRAM(s) 250 milliGRAM(s) Oral four times a day PRN muscle spasm  guaiFENesin Oral Liquid (Sugar-Free) 200 milliGRAM(s) Oral every 6 hours PRN Cough  HYDROmorphone  Injectable 1 milliGRAM(s) IV Push every 4 hours PRN Severe Pain (7 - 10)  HYDROmorphone  Injectable 0.5 milliGRAM(s) IV Push every 4 hours PRN Moderate Pain (4 - 6)  LORazepam   Injectable 0.5 milliGRAM(s) IV Push three times a day PRN Anxiety  promethazine 12.5 milliGRAM(s) Oral two times a day PRN nausea        I&O's Summary    28 Dec 2021 07:01  -  29 Dec 2021 07:00  --------------------------------------------------------  IN: 1320 mL / OUT: 1930 mL / NET: -610 mL    29 Dec 2021 07:01  -  29 Dec 2021 14:24  --------------------------------------------------------  IN: 0 mL / OUT: 550 mL / NET: -550 mL        PHYSICAL EXAM:  Vital Signs Last 24 Hrs  T(C): 37.2 (29 Dec 2021 10:11), Max: 37.2 (29 Dec 2021 10:11)  T(F): 99 (29 Dec 2021 10:11), Max: 99 (29 Dec 2021 10:11)  HR: 78 (29 Dec 2021 10:11) (73 - 78)  BP: 134/85 (29 Dec 2021 10:11) (134/85 - 142/79)  BP(mean): --  RR: 17 (29 Dec 2021 10:11) (16 - 18)  SpO2: 97% (29 Dec 2021 10:11) (95% - 98%)      CONSTITUTIONAL: NAD,   ENMT: Moist oral mucosa, no pharyngeal injection or exudates; normal dentition  RESPIRATORY: Normal respiratory effort; on high flow. left CT. Decreased breath sounds in the left lung base.   CARDIOVASCULAR: Regular rate and rhythm, normal S1 and S2, ; No lower extremity edema;  ABDOMEN: Nontender to palpation, normoactive bowel sounds, no rebound/guarding  MUSCLOSKELETAL: no clubbing or cyanosis of digits; no joint swelling or tenderness to palpation  PSYCH: A+O to person, place, and time; affect appropriate  NEUROLOGY: CN 2-12 are intact and symmetric; no gross sensory deficits;   SKIN: No rashes; no palpable lesions    LABS:                        11.6   8.38  )-----------( 465      ( 29 Dec 2021 07:06 )             36.0     12-29    141  |  106  |  34.1<H>  ----------------------------<  151<H>  4.8   |  24.0  |  0.82    Ca    7.3<L>      29 Dec 2021 07:06  Mg     2.6     12-29    TPro  6.0<L>  /  Alb  3.0<L>  /  TBili  0.4  /  DBili  x   /  AST  35<H>  /  ALT  34<H>  /  AlkPhos  352<H>  12-28              Culture - Acid Fast - Body Fluid w/Smear (collected 28 Dec 2021 12:56)  Source: .Body Fluid Pericardial Fluid    Culture - Body Fluid with Gram Stain (collected 28 Dec 2021 12:55)  Source: .Body Fluid Pericardial Fluid  Gram Stain (28 Dec 2021 22:15):    No polymorphonuclear leukocytes seen per low power field    No organisms seen per oil power field  Preliminary Report (29 Dec 2021 12:57):    No growth    Culture - Fungal, Body Fluid (collected 28 Dec 2021 12:55)  Source: .Body Fluid Pericardial Fluid  Preliminary Report (29 Dec 2021 06:43):    Testing in progress      CAPILLARY BLOOD GLUCOSE            RADIOLOGY & ADDITIONAL TESTS:  Results Reviewed:   Imaging Personally Reviewed:  Electrocardiogram Personally Reviewed:

## 2021-12-29 NOTE — PROGRESS NOTE ADULT - SUBJECTIVE AND OBJECTIVE BOX
ORTHO-SPINE PROGRESS NOTE:    Pt Name: NATIVIDAD MYERS    MRN: [id number]      Patient is a 36 year old female being followed for Pathologic compression fx of T2 and T7.  patient feeling well today, had pericardial window.  patient denies any weakness or numbness today.  patient pending CT of spine, states will decide if she wants MRI at that point       PAST MEDICAL & SURGICAL HISTORY:  PAST MEDICAL & SURGICAL HISTORY:  Breast CA    H/O compression fracture of spine    Anxiety    S/P tonsillectomy        Allergies: Perjeta (Other (Severe))  pertuzumab (Other (Severe))      Medications: acetaminophen     Tablet .. 650 milliGRAM(s) Oral every 6 hours PRN  albuterol/ipratropium for Nebulization 3 milliLiter(s) Nebulizer every 6 hours PRN  Carisoprodol 250 milliGRAM(s) 250 milliGRAM(s) Oral four times a day PRN  enoxaparin Injectable 40 milliGRAM(s) SubCutaneous daily  escitalopram 5 milliGRAM(s) Oral daily  guaiFENesin Oral Liquid (Sugar-Free) 200 milliGRAM(s) Oral every 6 hours PRN  HYDROmorphone  Injectable 1 milliGRAM(s) IV Push every 4 hours PRN  HYDROmorphone  Injectable 0.5 milliGRAM(s) IV Push every 4 hours PRN  LORazepam   Injectable 0.5 milliGRAM(s) IV Push three times a day PRN  methylPREDNISolone sodium succinate Injectable 60 milliGRAM(s) IV Push every 6 hours  modafinil 100 milliGRAM(s) Oral daily  morphine ER Tablet 30 milliGRAM(s) Oral three times a day  polyethylene glycol 3350 17 Gram(s) Oral daily  promethazine 12.5 milliGRAM(s) Oral two times a day PRN  senna 2 Tablet(s) Oral at bedtime        Ambulation: Walking independently [ ] With Cane [ ] With Walker [ ]  Bedbound [ ]                           11.6   8.38  )-----------( 465      ( 29 Dec 2021 07:06 )             36.0         141  |  106  |  34.1<H>  ----------------------------<  151<H>  4.8   |  24.0  |  0.82    Ca    7.3<L>      29 Dec 2021 07:06  Mg     2.6     12    TPro  6.0<L>  /  Alb  3.0<L>  /  TBili  0.4  /  DBili  x   /  AST  35<H>  /  ALT  34<H>  /  AlkPhos  352<H>        PHYSICAL EXAM:    Vital Signs Last 24 Hrs  T(C): 36.7 (28 Dec 2021 15:58), Max: 36.8 (28 Dec 2021 10:15)  T(F): 98 (28 Dec 2021 15:58), Max: 98.2 (28 Dec 2021 10:15)  HR: 73 (29 Dec 2021 03:24) (73 - 84)  BP: 136/78 (28 Dec 2021 20:22) (120/80 - 146/74)  BP(mean): --  RR: 16 (29 Dec 2021 03:24) (14 - 18)  SpO2: 98% (29 Dec 2021 03:24) (93% - 98%)  Daily     Daily Weight in k (29 Dec 2021 07:18)    Appearance: Alert, responsive, in no acute distress.      Skin: no rash on visible skin. Skin is clean, dry and intact. No bleeding. No abrasions. No ulcerations.                               Motor exam:                 Lower extremity                        HF(l2)   KE(l3)    TA(l4)   EHL(l5)  GS(s1)                                                 R          5/5        5/5       5/5       5/5         5/5                                               L         5/5        5/5       5/5       5/5          5/5      A/P:  Pt is a 36y Female with T2 and T7 compression fx.     PLAN:   * Will follow up CT scans   further plan pending results

## 2021-12-29 NOTE — PROGRESS NOTE ADULT - ASSESSMENT
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 Santana), Anxiety, and Muscle Spasms who presents to the ED with complaints of worsening SOB and found to have bilateral pleural effusions, left sided opacities and worsening lymphangitic spread of disease. Patient requiring BiPAP on arrival for oxygenation, currently switched to HFNC. CT spine also showing new acute T2 and T7 compression fractures, bony mets. Further imaging pending.  Thoracic surgery consulted for pleural effusion.  On 12/23 left pigtail inserted for left pleural effusion.  12/28 s/p pericardiocentesis 250cc serous fluid, repeat TTE with resolution of effusion.

## 2021-12-29 NOTE — PROGRESS NOTE ADULT - PROBLEM SELECTOR PLAN 2
s/p pericardiocentesis on 12/28/21 with cardiology  Repeat TTE in 1 week as per cardiology  Discussed plan with Dr. Sow & Thoracic surgery in am rounds.

## 2021-12-29 NOTE — PROGRESS NOTE ADULT - SUBJECTIVE AND OBJECTIVE BOX
Subjective: Pt. seen & examined, NAD noted    VITAL SIGNS  Vital Signs Last 24 Hrs  T(C): 36.7 (21 @ 15:58), Max: 36.7 (21 @ 15:58)  T(F): 98 (21 @ 15:58), Max: 98 (21 @ 15:58)  HR: 73 (21 @ 03:24) (73 - 78)  BP: 136/78 (21 @ 20:22) (122/74 - 142/79)  RR: 16 (21 @ 03:24) (15 - 18)  SpO2: 98% (21 @ 03:24) (95% - 98%)  on (O2)              Telemetry:  Sinus rhythm  LVEF: 65%    MEDICATIONS  acetaminophen     Tablet .. 650 milliGRAM(s) Oral every 6 hours PRN  albuterol/ipratropium for Nebulization 3 milliLiter(s) Nebulizer every 6 hours PRN  Carisoprodol 250 milliGRAM(s) 250 milliGRAM(s) Oral four times a day PRN  enoxaparin Injectable 40 milliGRAM(s) SubCutaneous daily  escitalopram 5 milliGRAM(s) Oral daily  guaiFENesin Oral Liquid (Sugar-Free) 200 milliGRAM(s) Oral every 6 hours PRN  HYDROmorphone  Injectable 1 milliGRAM(s) IV Push every 4 hours PRN  HYDROmorphone  Injectable 0.5 milliGRAM(s) IV Push every 4 hours PRN  LORazepam   Injectable 0.5 milliGRAM(s) IV Push three times a day PRN  methylPREDNISolone sodium succinate Injectable 60 milliGRAM(s) IV Push every 6 hours  modafinil 100 milliGRAM(s) Oral daily  morphine ER Tablet 30 milliGRAM(s) Oral three times a day  polyethylene glycol 3350 17 Gram(s) Oral daily  promethazine 12.5 milliGRAM(s) Oral two times a day PRN  senna 2 Tablet(s) Oral at bedtime      PHYSICAL EXAM  General:  no acute distress  Neurology: alert and oriented x 3, nonfocal, no gross deficits  Respiratory: Diminished at the left base   CV: regular rate and rhythm, normal S1, S2  Abdomen: soft, nontender, nondistended, positive bowel sounds  Extremities: warm, well perfused. no edema. + DP pulses  Chest tubes: left pigtail to suction, no air leak detected       I&O's Detail    28 Dec 2021 07:01  -  29 Dec 2021 07:00  --------------------------------------------------------  IN:    Oral Fluid: 1320 mL  Total IN: 1320 mL    OUT:    Chest Tube (mL): 230 mL    Voided (mL): 1700 mL  Total OUT: 1930 mL    Total NET: -610 mL      29 Dec 2021 07:01  -  29 Dec 2021 11:07  --------------------------------------------------------  IN:  Total IN: 0 mL    OUT:    Voided (mL): 550 mL  Total OUT: 550 mL    Total NET: -550 mL          Weights:  Daily     Daily Weight in k (29 Dec 2021 07:18)  Admit Wt: Drug Dosing Weight  Height (cm): 154.9 (22 Dec 2021 19:40)  Weight (kg): 76.2 (22 Dec 2021 19:40)  BMI (kg/m2): 31.8 (22 Dec 2021 19:40)  BSA (m2): 1.75 (22 Dec 2021 19:40)    LABS      141  |  106  |  34.1<H>  ----------------------------<  151<H>  4.8   |  24.0  |  0.82    Ca    7.3<L>      29 Dec 2021 07:06  Mg     2.6         TPro  6.0<L>  /  Alb  3.0<L>  /  TBili  0.4  /  DBili  x   /  AST  35<H>  /  ALT  34<H>  /  AlkPhos  352<H>                                   11.6   8.38  )-----------( 465      ( 29 Dec 2021 07:06 )             36.0                  CAPILLARY BLOOD GLUCOSE    < from: TTE Echo Limited or F/U (21 @ 09:24) >  Date of Exam:        2021 7:31:10 AM  Sonographer:         Rico Rob  Referring Physician: Jorge Seals MD    Procedure:   Follow up or Limited Echocardiogram.  Indications: Pericardial effusion (noninflammatory) - I31.3  Diagnosis:   Pericardial effusion (noninflammatory) - I31.3    SPECTRAL DOPPLER ANALYSIS (where applicable):    PHYSICIAN INTERPRETATION:  Left Ventricle: Normal left ventricular size and wall thicknesses, with   normal systolic and diastolic function.  Left ventricular ejection fraction, by visual estimation, is 60 to 65%.  Pericardium: Patient is s/p pericardiocentesis. The previously seen   moderate sized pericardial effusion is resolved.      Summary:   1. Left ventricular ejection fraction, by visual estimation, is 60 to   65%.   2. Patient is s/p pericardiocentesis. The previously seen moderate sized   pericardial effusion is resolved.    MD Maria Victoria Electronically signed on 2021 at 9:24:33 AM        *** Final ***              YVONNE BLOCK   This document has been electronically signed. Dec 28 2021  9:24AM    < end of copied text >             CXR:  < from: Xray Chest 1 View- PORTABLE-Routine (Xray Chest 1 View- PORTABLE-Routine in AM.) (21 @ 04:30) >  ACC: [id number] EXAM:  XR CHEST PORTABLE ROUTINE 1V                          PROCEDURE DATE:  2021          INTERPRETATION:  Portable chest radiograph    CLINICAL INFORMATION: LEFT chest tube. Follow-up    TECHNIQUE:  Portable  AP view of the chest.    COMPARISON: No previous examinations are available for review.    FINDINGS: Mediport catheter tip in SVC.  LEFT multi-sidehole pigtail catheter overlies LEFT lower hemithorax.  Unchanged bilateral  multifocal and diffuse ill-defined airspace   opacities..   No pneumothorax.    Heart size difficult to assess due to adjacent opacified parenchyma.    Visualized osseous structures are intact.    IMPRESSION:    Persistent bilateral  multifocal and diffuse ill-defined airspace   opacities..   LEFT multi-sidehole pigtail chest tube catheter in place..    --- End of Report ---            SERA GARVEY MD; Attending Radiologist  This document has been electronically signed. Dec 26 2021 10:47AM    < end of copied text >    < from: CT Angio Chest PE Protocol w/ IV Cont (21 @ 20:56) >  ACC: [id number] EXAM:  CT ANGIO CHEST PULM ART WAWI                          PROCEDURE DATE:  2021          INTERPRETATION:  CLINICAL INFORMATION: Shortness of breath. History of   breast cancer with metastatic disease. Hypoxic. Normal-appearing.    COMPARISON: CT chest from 11/3/2021 and PET scan 2021    CONTRAST/COMPLICATIONS:  IV Contrast: Omnipaque 350  54 cc administered   66 cc discarded  Oral Contrast: NONE  Complications: None reported at time of study completion    TECHNIQUE:  CT pulmonary angiography was performed of the chest according   to standard institutional protocol. Coronal, sagittal and transaxial 3-D   MIP reformatted images are provided from the transaxial source data.    FINDINGS:  There is good opacification of pulmonary arterial tree, however, there is   motion artifact present which degrades image quality and limits   evaluation of the subsegmental and peripheral vasculature. No central,   lobar or segmental filling defect is present to suggest acute pulmonary   embolus.    The thyroid gland is unremarkable.    There are bilateral pleural effusions, small-to-moderate on the left and   small on the right, slightly increased on the right compared to the prior   PET scan. Evaluation of the lung parenchyma demonstrates increased   bilateral airspace opacities and interlobular septal thickening from the   recent PET scan. New airspace consolidation in the left lower and left   upper lobe could be due to superimposed atelectasis.    Metastatic adenopathy in the mediastinum, right subpectoral and axillary   regions appear grossly unchanged compared to the recent PET scan.    The heart size is normal. Interval increased size of small pericardial   effusion. The thoracic aorta is normal in caliber without dissection.    Limited images through the upper abdomen again demonstrate bilobar   bilobar liver metastasis, grossly unchanged. Adenopathy in the upper   abdomen and retroperitoneum. Stable as well.    There is diffuse osseous metastatic disease again seen. There is a new   pathologic compression fracture deformity at T2 with mild retropulsion of   bone into the epidural space. New mild compression fracture deformities   is seen at T7 and mildly increased at T8, T9 and T10. Stable pathologic   compression fracture deformities at T12 and L1. Skin thickening and edema   in the right breast, unchanged. Right chest wall [id number] catheter,   unchanged.    IMPRESSION:  Limited due to respiratory motion. No gross central, lobar or segmental   pulmonary embolism.    Stable small to moderate left and mildly increased small right pleural   effusions. Interval worsening of lymphangitic spread of disease within   the lungs. Superimposed pulmonary edema and/or atypical infection not   excluded. New left upper and lower lobe areas of airspace consolidation   could be due to atelectasis and/or pneumonia.    Mildly increased small pericardial effusion.    Osseous metastatic disease with new pathologic compression fracture   deformities at T2 and T7. Mild retropulsion of bone at T2.    --- End of Report ---            SULEMAN ARRINGTON MD; Attending Radiologist  This document has been electronically signed. Dec 22 2021  9:17PM    < end of copied text >      PAST MEDICAL & SURGICAL HISTORY:  Breast CA    H/O compression fracture of spine    Anxiety    S/P tonsillectomy

## 2021-12-29 NOTE — PROGRESS NOTE ADULT - SUBJECTIVE AND OBJECTIVE BOX
[facility] - [facility]                                   [address] [place] [address]                                                      PHONE: [phone]                                                         FAX: [phone]  [email or web address]  ---------------------------------------------------------------------------------------------------------------------------------    Overnight events/patient complaints: s/p pericardiocentesis. Reports feeling improved. High flow de-escalated and now is on nasal canula     INTERPRETATION OF TELEMETRY (personally reviewed)    te< from: TTE Echo Limited or F/U (12.28.21 @ 09:24) >   1. Left ventricular ejection fraction, by visual estimation, is 60 to 65%.   2. Patient is s/p pericardiocentesis. The previously seen moderate sized   pericardial effusion is resolved.    I&O's Summary    28 Dec 2021 07:01  -  29 Dec 2021 07:00  --------------------------------------------------------  IN: 1320 mL / OUT: 1930 mL / NET: -610 mL    29 Dec 2021 07:01  -  29 Dec 2021 17:10  --------------------------------------------------------  IN: 0 mL / OUT: 1580 mL / NET: -1580 mL        PAST MEDICAL & SURGICAL HISTORY:  Breast CA    H/O compression fracture of spine    Anxiety    S/P tonsillectomy        Perjeta (Other (Severe))  pertuzumab (Other (Severe))    MEDICATIONS  (STANDING):  enoxaparin Injectable 40 milliGRAM(s) SubCutaneous daily  escitalopram 5 milliGRAM(s) Oral daily  methylPREDNISolone sodium succinate Injectable 60 milliGRAM(s) IV Push every 6 hours  modafinil 100 milliGRAM(s) Oral daily  morphine ER Tablet 30 milliGRAM(s) Oral three times a day  pantoprazole    Tablet 40 milliGRAM(s) Oral before breakfast  polyethylene glycol 3350 17 Gram(s) Oral daily  senna 2 Tablet(s) Oral at bedtime    MEDICATIONS  (PRN):  acetaminophen     Tablet .. 650 milliGRAM(s) Oral every 6 hours PRN Temp greater or equal to 38C (100.4F), Mild Pain (1 - 3)  albuterol/ipratropium for Nebulization 3 milliLiter(s) Nebulizer every 6 hours PRN Shortness of Breath and/or Wheezing  Carisoprodol 250 milliGRAM(s) 250 milliGRAM(s) Oral four times a day PRN muscle spasm  guaiFENesin Oral Liquid (Sugar-Free) 200 milliGRAM(s) Oral every 6 hours PRN Cough  HYDROmorphone  Injectable 1 milliGRAM(s) IV Push every 4 hours PRN Severe Pain (7 - 10)  HYDROmorphone  Injectable 0.5 milliGRAM(s) IV Push every 4 hours PRN Moderate Pain (4 - 6)  LORazepam   Injectable 0.5 milliGRAM(s) IV Push three times a day PRN Anxiety  ondansetron Injectable 4 milliGRAM(s) IV Push every 6 hours PRN Nausea and/or Vomiting  promethazine 12.5 milliGRAM(s) Oral two times a day PRN nausea      Vital Signs Last 24 Hrs  T(C): 36.8 (29 Dec 2021 15:27), Max: 37.2 (29 Dec 2021 10:11)  T(F): 98.3 (29 Dec 2021 15:27), Max: 99 (29 Dec 2021 10:11)  HR: 92 (29 Dec 2021 17:04) (73 - 92)  BP: 133/86 (29 Dec 2021 15:27) (133/86 - 136/78)  BP(mean): --  RR: 18 (29 Dec 2021 15:27) (16 - 18)  SpO2: 97% (29 Dec 2021 17:04) (95% - 98%)  ICU Vital Signs Last 24 Hrs    PHYSICAL EXAM:  General: Appears well developed, well nourished alert and cooperative.  HEENT: Head; normocephalic, atraumatic.Pupils reactive, cornea wnl. Neck supple, no nodes adenopathy, no JVD  CARDIOVASCULAR: Normal S1 and S2, 1/6 MISAEL, no rub, gallop or lift.   LUNGS: No rales, rhonchi or wheeze. decreased breath sounds bilaterally.  ABDOMEN: Soft, nontender without mass or organomegaly. bowel sounds normoactive.  EXTREMITIES: No clubbing, cyanosis or edema.   SKIN: warm and dry with normal turgor.  NEURO: Alert/oriented x 3/normal motor exam.   PSYCH: normal affect.        LABS:                        11.6   8.38  )-----------( 465      ( 29 Dec 2021 07:06 )             36.0     12-29    141  |  106  |  34.1<H>  ----------------------------<  151<H>  4.8   |  24.0  |  0.82    Ca    7.3<L>      29 Dec 2021 07:06  Mg     2.6     12-29    TPro  6.0<L>  /  Alb  3.0<L>  /  TBili  0.4  /  DBili  x   /  AST  35<H>  /  ALT  34<H>  /  AlkPhos  352<H>  12-28    ASSESSMENT AND PLAN:  In summary, NATIVIDAD MYERS is a 36y Female with past medical history significant for metastatic infiltrating ductal carcinoma, who was recently admitted in [facility] from 12/12-12/13 for SOB and found to have a malignant pleural effusion s/p chest tube placement and discharged on home O2 who presents with increasing shortness of breath found to have bilateral effusions (larger on the left), worsening lymphangitic spread, left sided opacities and moderate pericardial effusion     Acute hypoxic respiratory failure/pleural effusion/pericardial effusion  - s/p pericardiocentesis  - continue O2 support, wean as tolerated  - add colchicine 0.6mg PO Q12  - 2 week TTE as outpatient for interval follow-up of effusion    Thank you for allowing [facility] to participate in the care of this patient.  Please feel free to call with any questions or concerns.

## 2021-12-29 NOTE — PROGRESS NOTE ADULT - ASSESSMENT
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 Santana), Anxiety, and Muscle Spasms who presents to the ED with complaints of worsening SOB.    CT angio was negative for PE, but revealed bilateral effusions (larger on the left), worsening lymphangitic spread and left sided opacities (Atelectasis vs PNA).    pt s/p chest tube with continued serous fluid   pt more comfortable now; sitting in bed   no fevers.  On 6L O2 NC    Metastatic Her 2 + breast ca   - poor tolerance to Perjeta   - rapidly progressive disease with malignant pleural effusion and lymphangitic spread with resp compromise   - started chemo with Taxotere + Herceptin 12/27 and tolerated well.  - close pulm f/u .  High dose steroids.  Solumedrol 60mg IV Q6hrs       Malignant Pleural / pericardial effusion   - s/p Left chest tube - CT surgery on board  - s/p pericardiocentesis and she is feeling better.    Compression fractures - ortho on board. Plan if for CT spine. Pending.    Constipation - management as per primary team.     Thank you,      Pan Rivers MD  [facility]  [phone]

## 2021-12-30 PROBLEM — F41.9 ANXIETY DISORDER, UNSPECIFIED: Chronic | Status: ACTIVE | Noted: 2021-12-23

## 2021-12-30 PROBLEM — Z87.81 PERSONAL HISTORY OF (HEALED) TRAUMATIC FRACTURE: Chronic | Status: ACTIVE | Noted: 2021-12-23

## 2021-12-30 LAB
ALBUMIN SERPL ELPH-MCNC: 2.9 G/DL — LOW (ref 3.3–5.2)
ALP SERPL-CCNC: 275 U/L — HIGH (ref 40–120)
ALT FLD-CCNC: 54 U/L — HIGH
ANION GAP SERPL CALC-SCNC: 10 MMOL/L — SIGNIFICANT CHANGE UP (ref 5–17)
AST SERPL-CCNC: 53 U/L — HIGH
BILIRUB SERPL-MCNC: 0.6 MG/DL — SIGNIFICANT CHANGE UP (ref 0.4–2)
BUN SERPL-MCNC: 32.3 MG/DL — HIGH (ref 8–20)
CALCIUM SERPL-MCNC: 7.5 MG/DL — LOW (ref 8.6–10.2)
CHLORIDE SERPL-SCNC: 106 MMOL/L — SIGNIFICANT CHANGE UP (ref 98–107)
CO2 SERPL-SCNC: 24 MMOL/L — SIGNIFICANT CHANGE UP (ref 22–29)
CREAT SERPL-MCNC: 0.71 MG/DL — SIGNIFICANT CHANGE UP (ref 0.5–1.3)
GLUCOSE SERPL-MCNC: 152 MG/DL — HIGH (ref 70–99)
HCT VFR BLD CALC: 36.8 % — SIGNIFICANT CHANGE UP (ref 34.5–45)
HGB BLD-MCNC: 11.9 G/DL — SIGNIFICANT CHANGE UP (ref 11.5–15.5)
MAGNESIUM SERPL-MCNC: 2.6 MG/DL — SIGNIFICANT CHANGE UP (ref 1.6–2.6)
MCHC RBC-ENTMCNC: 27.6 PG — SIGNIFICANT CHANGE UP (ref 27–34)
MCHC RBC-ENTMCNC: 32.3 GM/DL — SIGNIFICANT CHANGE UP (ref 32–36)
MCV RBC AUTO: 85.4 FL — SIGNIFICANT CHANGE UP (ref 80–100)
PLATELET # BLD AUTO: 433 K/UL — HIGH (ref 150–400)
POTASSIUM SERPL-MCNC: 5.2 MMOL/L — SIGNIFICANT CHANGE UP (ref 3.5–5.3)
POTASSIUM SERPL-SCNC: 5.2 MMOL/L — SIGNIFICANT CHANGE UP (ref 3.5–5.3)
PROT SERPL-MCNC: 5.5 G/DL — LOW (ref 6.6–8.7)
RBC # BLD: 4.31 M/UL — SIGNIFICANT CHANGE UP (ref 3.8–5.2)
RBC # FLD: 13.4 % — SIGNIFICANT CHANGE UP (ref 10.3–14.5)
SODIUM SERPL-SCNC: 140 MMOL/L — SIGNIFICANT CHANGE UP (ref 135–145)
WBC # BLD: 6.72 K/UL — SIGNIFICANT CHANGE UP (ref 3.8–10.5)
WBC # FLD AUTO: 6.72 K/UL — SIGNIFICANT CHANGE UP (ref 3.8–10.5)

## 2021-12-30 PROCEDURE — 71045 X-RAY EXAM CHEST 1 VIEW: CPT | Mod: 26,59

## 2021-12-30 PROCEDURE — 99233 SBSQ HOSP IP/OBS HIGH 50: CPT

## 2021-12-30 PROCEDURE — 99231 SBSQ HOSP IP/OBS SF/LOW 25: CPT

## 2021-12-30 RX ADMIN — MORPHINE SULFATE 30 MILLIGRAM(S): 50 CAPSULE, EXTENDED RELEASE ORAL at 06:31

## 2021-12-30 RX ADMIN — HYDROMORPHONE HYDROCHLORIDE 1 MILLIGRAM(S): 2 INJECTION INTRAMUSCULAR; INTRAVENOUS; SUBCUTANEOUS at 04:33

## 2021-12-30 RX ADMIN — Medication 60 MILLIGRAM(S): at 11:57

## 2021-12-30 RX ADMIN — HYDROMORPHONE HYDROCHLORIDE 1 MILLIGRAM(S): 2 INJECTION INTRAMUSCULAR; INTRAVENOUS; SUBCUTANEOUS at 23:01

## 2021-12-30 RX ADMIN — Medication 0.5 MILLIGRAM(S): at 23:18

## 2021-12-30 RX ADMIN — Medication 60 MILLIGRAM(S): at 18:10

## 2021-12-30 RX ADMIN — Medication 0.5 MILLIGRAM(S): at 00:43

## 2021-12-30 RX ADMIN — HYDROMORPHONE HYDROCHLORIDE 1 MILLIGRAM(S): 2 INJECTION INTRAMUSCULAR; INTRAVENOUS; SUBCUTANEOUS at 23:15

## 2021-12-30 RX ADMIN — HYDROMORPHONE HYDROCHLORIDE 1 MILLIGRAM(S): 2 INJECTION INTRAMUSCULAR; INTRAVENOUS; SUBCUTANEOUS at 09:19

## 2021-12-30 RX ADMIN — MORPHINE SULFATE 30 MILLIGRAM(S): 50 CAPSULE, EXTENDED RELEASE ORAL at 21:24

## 2021-12-30 RX ADMIN — HYDROMORPHONE HYDROCHLORIDE 1 MILLIGRAM(S): 2 INJECTION INTRAMUSCULAR; INTRAVENOUS; SUBCUTANEOUS at 04:03

## 2021-12-30 RX ADMIN — MODAFINIL 100 MILLIGRAM(S): 200 TABLET ORAL at 13:59

## 2021-12-30 RX ADMIN — ONDANSETRON 4 MILLIGRAM(S): 8 TABLET, FILM COATED ORAL at 18:09

## 2021-12-30 RX ADMIN — ENOXAPARIN SODIUM 40 MILLIGRAM(S): 100 INJECTION SUBCUTANEOUS at 11:57

## 2021-12-30 RX ADMIN — MORPHINE SULFATE 30 MILLIGRAM(S): 50 CAPSULE, EXTENDED RELEASE ORAL at 07:01

## 2021-12-30 RX ADMIN — MORPHINE SULFATE 30 MILLIGRAM(S): 50 CAPSULE, EXTENDED RELEASE ORAL at 14:00

## 2021-12-30 RX ADMIN — Medication 60 MILLIGRAM(S): at 23:01

## 2021-12-30 RX ADMIN — MORPHINE SULFATE 30 MILLIGRAM(S): 50 CAPSULE, EXTENDED RELEASE ORAL at 22:24

## 2021-12-30 RX ADMIN — ONDANSETRON 4 MILLIGRAM(S): 8 TABLET, FILM COATED ORAL at 09:19

## 2021-12-30 RX ADMIN — Medication 60 MILLIGRAM(S): at 06:31

## 2021-12-30 RX ADMIN — PANTOPRAZOLE SODIUM 40 MILLIGRAM(S): 20 TABLET, DELAYED RELEASE ORAL at 06:31

## 2021-12-30 RX ADMIN — HYDROMORPHONE HYDROCHLORIDE 1 MILLIGRAM(S): 2 INJECTION INTRAMUSCULAR; INTRAVENOUS; SUBCUTANEOUS at 18:09

## 2021-12-30 RX ADMIN — HYDROMORPHONE HYDROCHLORIDE 1 MILLIGRAM(S): 2 INJECTION INTRAMUSCULAR; INTRAVENOUS; SUBCUTANEOUS at 14:01

## 2021-12-30 RX ADMIN — ESCITALOPRAM OXALATE 5 MILLIGRAM(S): 10 TABLET, FILM COATED ORAL at 11:58

## 2021-12-30 NOTE — PROGRESS NOTE ADULT - SUBJECTIVE AND OBJECTIVE BOX
Patient is a 36y old  Female who presents with a chief complaint of SOB (24 Dec 2021 12:20)      HPI:  Patient is a 36 year old female with recently diagnosed Metastatic Right Breast CA (Infiltrating Ductal Carcinoma with mets to the Spine and Liver), Malignant Pleural Effusion on Home O2 (3-5 liters), Pathologic Compression Fractures (Follows with Dr. Santana), Anxiety, and Muscle Spasms who presents to the ED with complaints of worsening SOB. Patient was recently admitted in [facility] from 12/2-12/13 for SOB following her first dose of Perjeta.  She required a left sided chest tube.  Patient states she was discharged on home O2 and was doing relatively well until today when she acutely became more dyspneic. Pulse ox was 84% on 5 liters and patient reports feeling an overwhelming feeling of doom and chest pressure. Denies any recent fevers or chills, but does report a productive cough (intermittently yellow). CT angio was negative for PE, but revealed bilateral effusions (larger on the left), worsening lymphangitic spread and left sided opacities (Atelectasis vs PNA).  In the ED, patient was initially placed on bipap and seen by MICU who switched the patient to Hi-diana and recommended admission to step down unit. Patient seen and examined, reports SOB has improved since initial presentation but visibly appears anxious. Complaining of pleuritic right sided chest pain when coughing and chronic back pain for which she follows with Dr. Santana. Denies lightheadedness, dizziness, chest pain at this time, vomiting, abdominal pain or diarrhea. Otherwise, reports mild nausea and weakness.  (22 Dec 2021 23:55)     12/30  Has left chest tube in place   got chemotherapy on 12/27/2021 - early evening.  underwent pericardiocentesis yesterday and breathing is better.  She is now on 6L 02 NC sat 97%  pt more comfortable now; sitting in bed. Pain is well controlled with medication.   Chest tube still draining.  no fevers.     MEDICATIONS  (STANDING):  enoxaparin Injectable 40 milliGRAM(s) SubCutaneous daily  escitalopram 5 milliGRAM(s) Oral daily  methylPREDNISolone sodium succinate Injectable 60 milliGRAM(s) IV Push every 6 hours  modafinil 100 milliGRAM(s) Oral daily  morphine ER Tablet 30 milliGRAM(s) Oral three times a day  polyethylene glycol 3350 17 Gram(s) Oral daily  senna 2 Tablet(s) Oral at bedtime    MEDICATIONS  (PRN):  acetaminophen     Tablet .. 650 milliGRAM(s) Oral every 6 hours PRN Temp greater or equal to 38C (100.4F), Mild Pain (1 - 3)  albuterol/ipratropium for Nebulization 3 milliLiter(s) Nebulizer every 6 hours PRN Shortness of Breath and/or Wheezing  Carisoprodol 250 milliGRAM(s) 250 milliGRAM(s) Oral four times a day PRN muscle spasm  guaiFENesin Oral Liquid (Sugar-Free) 200 milliGRAM(s) Oral every 6 hours PRN Cough  HYDROmorphone  Injectable 1 milliGRAM(s) IV Push every 4 hours PRN Severe Pain (7 - 10)  HYDROmorphone  Injectable 0.5 milliGRAM(s) IV Push every 4 hours PRN Moderate Pain (4 - 6)  LORazepam   Injectable 0.5 milliGRAM(s) IV Push three times a day PRN Anxiety  promethazine 12.5 milliGRAM(s) Oral two times a day PRN nausea      ICU Vital Signs Last 24 Hrs  T(C): 36.6 (30 Dec 2021 03:59), Max: 37.2 (29 Dec 2021 10:11)  T(F): 97.9 (30 Dec 2021 03:59), Max: 99 (29 Dec 2021 10:11)  HR: 68 (30 Dec 2021 04:00) (61 - 92)  BP: 134/87 (30 Dec 2021 03:59) (132/86 - 136/84)  BP(mean): 103 (30 Dec 2021 03:59) (101 - 103)  ABP: --  ABP(mean): --  RR: 20 (30 Dec 2021 03:59) (17 - 20)  SpO2: 95% (30 Dec 2021 04:00) (95% - 97%)    PHYSICAL EXAM  General: adult in NAD  HEENT: clear oropharynx, anicteric sclera, pink conjunctiva  Neck: supple  CV: normal S1/S2 with no murmur rubs or gallops  Lungs: left chest tube with serous fluid   Abdomen: soft non-tender non-distended, no hepatosplenomegaly  Ext: no clubbing cyanosis or edema  Skin: no rashes and no petechiae  Neuro: alert and oriented X 4, no focal deficits      LABS:                          11.9   6.72  )-----------( 433      ( 30 Dec 2021 06:15 )             36.8                             11.6   8.38  )-----------( 465      ( 29 Dec 2021 07:06 )             36.0                           11.8   11.52 )-----------( 518      ( 27 Dec 2021 05:40 )             37.6        10.57  H/H 11/34  plt ct 493  (12/26/2021)   7.57  H/H 11.5/36.8 plt ct 484,000 (12/25/2021)                11.0   11.28 )-----------( 471      ( 24 Dec 2021 06:03 )             35.0       12-30    140  |  106  |  32.3<H>  ----------------------------<  152<H>  5.2   |  24.0  |  0.71    Ca    7.5<L>      30 Dec 2021 06:17  Mg     2.6     12-30    TPro  5.5<L>  /  Alb  2.9<L>  /  TBili  0.6  /  DBili  x   /  AST  53<H>  /  ALT  54<H>  /  AlkPhos  275<H>  12-30 12-29    141  |  106  |  34.1<H>  ----------------------------<  151<H>  4.8   |  24.0  |  0.82    Ca    7.3<L>      29 Dec 2021 07:06  Mg     2.6     12-29    TPro  6.0<L>  /  Alb  3.0<L>  /  TBili  0.4  /  DBili  x   /  AST  35<H>  /  ALT  34<H>  /  AlkPhos  352<H>  12-28 12-27    137  |  101  |  30.7<H>  ----------------------------<  159<H>  4.8   |  24.0  |  1.26    Ca    8.7      27 Dec 2021 05:40  Mg     2.6     12-27    TPro  6.2<L>  /  Alb  3.0<L>  /  TBili  0.4  /  DBili  x   /  AST  27  /  ALT  24  /  AlkPhos  390<H>  12-27 12-24    134<L>  |  98  |  14.6  ----------------------------<  103<H>  3.5   |  22.0  |  0.81    Ca    8.9      24 Dec 2021 06:01  Phos  3.9     12-23  Mg     2.2     12-23        PT/INR - ( 22 Dec 2021 20:59 )   PT: 15.5 sec;   INR: 1.36 ratio         PTT - ( 22 Dec 2021 20:59 )  PTT:36.7 sec         Patient is a 36y old  Female who presents with a chief complaint of SOB (24 Dec 2021 12:20)      HPI:  Patient is a 36 year old female with recently diagnosed Metastatic Right Breast CA (Infiltrating Ductal Carcinoma with mets to the Spine and Liver), Malignant Pleural Effusion on Home O2 (3-5 liters), Pathologic Compression Fractures (Follows with Dr. Santana), Anxiety, and Muscle Spasms who presents to the ED with complaints of worsening SOB. Patient was recently admitted in [facility] from 12/2-12/13 for SOB following her first dose of Perjeta.  She required a left sided chest tube.  Patient states she was discharged on home O2 and was doing relatively well until today when she acutely became more dyspneic. Pulse ox was 84% on 5 liters and patient reports feeling an overwhelming feeling of doom and chest pressure. Denies any recent fevers or chills, but does report a productive cough (intermittently yellow). CT angio was negative for PE, but revealed bilateral effusions (larger on the left), worsening lymphangitic spread and left sided opacities (Atelectasis vs PNA).  In the ED, patient was initially placed on bipap and seen by MICU who switched the patient to Hi-diana and recommended admission to step down unit. Patient seen and examined, reports SOB has improved since initial presentation but visibly appears anxious. Complaining of pleuritic right sided chest pain when coughing and chronic back pain for which she follows with Dr. Santana. Denies lightheadedness, dizziness, chest pain at this time, vomiting, abdominal pain or diarrhea. Otherwise, reports mild nausea and weakness.  (22 Dec 2021 23:55)     12/30  L chest tube dislodged  got chemotherapy on 12/27/2021 - early evening.  underwent pericardiocentesis 12/27 and breathing is better.  She is now on 6L 02 NC sat 100%  pt more comfortable now; sitting in bed. Pain is well controlled with medication.   no fevers.     MEDICATIONS  (STANDING):  enoxaparin Injectable 40 milliGRAM(s) SubCutaneous daily  escitalopram 5 milliGRAM(s) Oral daily  methylPREDNISolone sodium succinate Injectable 60 milliGRAM(s) IV Push every 6 hours  modafinil 100 milliGRAM(s) Oral daily  morphine ER Tablet 30 milliGRAM(s) Oral three times a day  polyethylene glycol 3350 17 Gram(s) Oral daily  senna 2 Tablet(s) Oral at bedtime    MEDICATIONS  (PRN):  acetaminophen     Tablet .. 650 milliGRAM(s) Oral every 6 hours PRN Temp greater or equal to 38C (100.4F), Mild Pain (1 - 3)  albuterol/ipratropium for Nebulization 3 milliLiter(s) Nebulizer every 6 hours PRN Shortness of Breath and/or Wheezing  Carisoprodol 250 milliGRAM(s) 250 milliGRAM(s) Oral four times a day PRN muscle spasm  guaiFENesin Oral Liquid (Sugar-Free) 200 milliGRAM(s) Oral every 6 hours PRN Cough  HYDROmorphone  Injectable 1 milliGRAM(s) IV Push every 4 hours PRN Severe Pain (7 - 10)  HYDROmorphone  Injectable 0.5 milliGRAM(s) IV Push every 4 hours PRN Moderate Pain (4 - 6)  LORazepam   Injectable 0.5 milliGRAM(s) IV Push three times a day PRN Anxiety  promethazine 12.5 milliGRAM(s) Oral two times a day PRN nausea      ICU Vital Signs Last 24 Hrs  T(C): 36.6 (30 Dec 2021 03:59), Max: 37.2 (29 Dec 2021 10:11)  T(F): 97.9 (30 Dec 2021 03:59), Max: 99 (29 Dec 2021 10:11)  HR: 68 (30 Dec 2021 04:00) (61 - 92)  BP: 134/87 (30 Dec 2021 03:59) (132/86 - 136/84)  BP(mean): 103 (30 Dec 2021 03:59) (101 - 103)  ABP: --  ABP(mean): --  RR: 20 (30 Dec 2021 03:59) (17 - 20)  SpO2: 95% (30 Dec 2021 04:00) (95% - 97%)    PHYSICAL EXAM  General: adult in NAD  HEENT: clear oropharynx, anicteric sclera, pink conjunctiva  Neck: supple  CV: normal S1/S2 with no murmur rubs or gallops  Lungs: left chest tube with serous fluid   Abdomen: soft non-tender non-distended, no hepatosplenomegaly  Ext: no clubbing cyanosis or edema  Skin: no rashes and no petechiae  Neuro: alert and oriented X 4, no focal deficits      LABS:                          11.9   6.72  )-----------( 433      ( 30 Dec 2021 06:15 )             36.8                             11.6   8.38  )-----------( 465      ( 29 Dec 2021 07:06 )             36.0                           11.8   11.52 )-----------( 518      ( 27 Dec 2021 05:40 )             37.6        10.57  H/H 11/34  plt ct 493  (12/26/2021)   7.57  H/H 11.5/36.8 plt ct 484,000 (12/25/2021)                11.0   11.28 )-----------( 471      ( 24 Dec 2021 06:03 )             35.0       12-30    140  |  106  |  32.3<H>  ----------------------------<  152<H>  5.2   |  24.0  |  0.71    Ca    7.5<L>      30 Dec 2021 06:17  Mg     2.6     12-30    TPro  5.5<L>  /  Alb  2.9<L>  /  TBili  0.6  /  DBili  x   /  AST  53<H>  /  ALT  54<H>  /  AlkPhos  275<H>  12-30 12-29    141  |  106  |  34.1<H>  ----------------------------<  151<H>  4.8   |  24.0  |  0.82    Ca    7.3<L>      29 Dec 2021 07:06  Mg     2.6     12-29    TPro  6.0<L>  /  Alb  3.0<L>  /  TBili  0.4  /  DBili  x   /  AST  35<H>  /  ALT  34<H>  /  AlkPhos  352<H>  12-28 12-27    137  |  101  |  30.7<H>  ----------------------------<  159<H>  4.8   |  24.0  |  1.26    Ca    8.7      27 Dec 2021 05:40  Mg     2.6     12-27    TPro  6.2<L>  /  Alb  3.0<L>  /  TBili  0.4  /  DBili  x   /  AST  27  /  ALT  24  /  AlkPhos  390<H>  12-27 12-24    134<L>  |  98  |  14.6  ----------------------------<  103<H>  3.5   |  22.0  |  0.81    Ca    8.9      24 Dec 2021 06:01  Phos  3.9     12-23  Mg     2.2     12-23        PT/INR - ( 22 Dec 2021 20:59 )   PT: 15.5 sec;   INR: 1.36 ratio         PTT - ( 22 Dec 2021 20:59 )  PTT:36.7 sec

## 2021-12-30 NOTE — PROGRESS NOTE ADULT - SUBJECTIVE AND OBJECTIVE BOX
[facility] Division of Hospital Medicine    Chief Complaint:  Shortness of breath    SUBJECTIVE / OVERNIGHT EVENTS: Patient seen and examined. Ct dislodged on 12/29. Patient reports her breathing is better. She is tolerating diet and having regular BMs.     Patient denies chest pain, SOB, abd pain, N/V, fever, chills, dysuria or any other complaints. All remainder ROS negative.     MEDICATIONS  (STANDING):  enoxaparin Injectable 40 milliGRAM(s) SubCutaneous daily  escitalopram 5 milliGRAM(s) Oral daily  methylPREDNISolone sodium succinate Injectable 60 milliGRAM(s) IV Push every 6 hours  morphine ER Tablet 30 milliGRAM(s) Oral three times a day  pantoprazole    Tablet 40 milliGRAM(s) Oral before breakfast  polyethylene glycol 3350 17 Gram(s) Oral daily  senna 2 Tablet(s) Oral at bedtime    MEDICATIONS  (PRN):  acetaminophen     Tablet .. 650 milliGRAM(s) Oral every 6 hours PRN Temp greater or equal to 38C (100.4F), Mild Pain (1 - 3)  albuterol/ipratropium for Nebulization 3 milliLiter(s) Nebulizer every 6 hours PRN Shortness of Breath and/or Wheezing  Carisoprodol 250 milliGRAM(s) 250 milliGRAM(s) Oral four times a day PRN muscle spasm  guaiFENesin Oral Liquid (Sugar-Free) 200 milliGRAM(s) Oral every 6 hours PRN Cough  HYDROmorphone  Injectable 1 milliGRAM(s) IV Push every 4 hours PRN Severe Pain (7 - 10)  HYDROmorphone  Injectable 0.5 milliGRAM(s) IV Push every 4 hours PRN Moderate Pain (4 - 6)  LORazepam   Injectable 0.5 milliGRAM(s) IV Push three times a day PRN Anxiety  ondansetron Injectable 4 milliGRAM(s) IV Push every 6 hours PRN Nausea and/or Vomiting  promethazine 12.5 milliGRAM(s) Oral two times a day PRN nausea        I&O's Summary    29 Dec 2021 07:01  -  30 Dec 2021 07:00  --------------------------------------------------------  IN: 480 mL / OUT: 2250 mL / NET: -1770 mL        PHYSICAL EXAM:  Vital Signs Last 24 Hrs  T(C): 36.7 (30 Dec 2021 10:00), Max: 37.1 (29 Dec 2021 23:41)  T(F): 98 (30 Dec 2021 10:00), Max: 98.7 (29 Dec 2021 23:41)  HR: 73 (30 Dec 2021 10:00) (61 - 92)  BP: 151/92 (30 Dec 2021 10:00) (132/86 - 151/92)  BP(mean): 103 (30 Dec 2021 03:59) (101 - 103)  RR: 20 (30 Dec 2021 10:00) (18 - 20)  SpO2: 97% (30 Dec 2021 10:00) (95% - 97%)        CONSTITUTIONAL: NAD,   ENMT: Moist oral mucosa, no pharyngeal injection or exudates; normal dentition  RESPIRATORY: Normal respiratory effort; on high flow. Decreased breath sounds in the left lung base.   CARDIOVASCULAR: Regular rate and rhythm, normal S1 and S2, ; No lower extremity edema;  ABDOMEN: Nontender to palpation, normoactive bowel sounds, no rebound/guarding  MUSCLOSKELETAL: no clubbing or cyanosis of digits; no joint swelling or tenderness to palpation  PSYCH: A+O to person, place, and time; affect appropriate  NEUROLOGY: CN 2-12 are intact and symmetric; no gross sensory deficits;   SKIN: No rashes; no palpable lesions    LABS:                        11.9   6.72  )-----------( 433      ( 30 Dec 2021 06:15 )             36.8     12-30    140  |  106  |  32.3<H>  ----------------------------<  152<H>  5.2   |  24.0  |  0.71    Ca    7.5<L>      30 Dec 2021 06:17  Mg     2.6     12-30    TPro  5.5<L>  /  Alb  2.9<L>  /  TBili  0.6  /  DBili  x   /  AST  53<H>  /  ALT  54<H>  /  AlkPhos  275<H>  12-30              Culture - Acid Fast - Body Fluid w/Smear (collected 28 Dec 2021 12:56)  Source: .Body Fluid Pericardial Fluid  Preliminary Report (29 Dec 2021 15:05):    Culture is being performed.    Culture - Body Fluid with Gram Stain (collected 28 Dec 2021 12:55)  Source: .Body Fluid Pericardial Fluid  Gram Stain (28 Dec 2021 22:15):    No polymorphonuclear leukocytes seen per low power field    No organisms seen per oil power field  Preliminary Report (29 Dec 2021 12:57):    No growth    Culture - Fungal, Body Fluid (collected 28 Dec 2021 12:55)  Source: .Body Fluid Pericardial Fluid  Preliminary Report (29 Dec 2021 06:43):    Testing in progress      CAPILLARY BLOOD GLUCOSE            RADIOLOGY & ADDITIONAL TESTS:  Results Reviewed:   Imaging Personally Reviewed:  Electrocardiogram Personally Reviewed:

## 2021-12-30 NOTE — PROGRESS NOTE ADULT - ASSESSMENT
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 Santana), Anxiety, and Muscle Spasms who presents to the ED with complaints of worsening SOB and found to have bilateral pleural effusions, left sided opacities and worsening lymphangitic spread of disease. Reports recent admission 12/3 for same at [facility] - had CT pleural effusion drainage > 1lit. She was discharged home on 12/13 on 2-4 lit NC oxygen. She had chest tube placement on 12/23 per thoracic sx service with 1lit drained. ECHO - showed moderate to severe pericardial effusion with cardiac tamponade physiology. this was discussed extensively with cardio, thoracic sx for possible pericardial window - but at this time, she is not deemed a good candidate for this. Repeat Echo showed persistent pericardial effusion. S/ pericardiocentesis on 12/28. Patient also received inpatient  Chemo on 12/27.     Acute on Chronic Hypoxic Respiratory Failure likely due to Malignant exudative Effusions and worsening lymphangitic spread of disease, improving  by lights criteria effusion is Exudative   - s/p CT, was dislodged on 12/29. Planning for Pleurx on Monday 1/3/22.   -currently on NC 5L, wean as able  -CT angio negative for PE; bilateral pleural effusions (left > right), left sided opacities (atelectasis vs atypical PNA) and worsening lymphangitic spread of disease  -blood cultures negative  - Per ID dc abx as cultures are negative. Pneumonia ruled out.   -Bronchodilators and cough expectorant as needed  -anxiolytics and analgesia as needed  -incentive spirometry  -Pulm following   -monitor respiratory status closely, prognosis guarded  - Onc consult appreciated.   - Repeat CXR in AM.     Metastatic, Infiltrating Ductal Carcinoma of the Right Breast  -with liver and bone mets (pathologic, compression fractures of C3 and T2/T7)  -underwent immunotherapy on 12/2 and developed a hypersensitivity reaction  -was scheduled for chemo on 12/27 with Dr. Chapa ([facility]).   - s/p inpatient Chemo Herceptin and Docetaxel on 12/27.   - Hem/onc following, continue on IV steroids    Anxiety  -on Xanax at home  -Ativan prn  -continue escitalopram which was recently started    Back Pain due to Pathologic Compression Fractures  -known C3/T2 compression fractures  -CT angio with new T7 compression fracture with mild retropulsion   -Obtain dedicated imaging of T-spine  -MRIs reviewed from November 2021  -ortho spine consulted (Follows with Dr. Santana as outpatient)  - CT with C3, C4, C6, T2 and T7, and L1 compression fx.   -f/u  MRI spine pending.   -Continue Morphine ER 30 mg TID  -IV dilaudid for mod/severe pain as needed  -Continue muscle relaxer - on carisoprodol as needed    Moderate Pericardial Effusion  -likely malignant  -TNI negative; BNP within normal range  -patient reports effusion was small on recent TTE at [facility] on 12/12 or 12/13  -patient was seen by [facility] cardio at that time  - TTE - here - moderate pericardial effusion - possible early cardiac tamponade physiology - not a suitable candidate for pericardial window placement at this time per thoracic Sx.    - s/p pericardiocentesis on 12/28, 250cc serous fluid removed.  - will need repeat echo in 1 week    KURT, resolved  - encouraged hydration, Will hold off on IVFs for now to prevent reaccumulation of pleural effusion    Constipation, resolved  - senna and Miralax    DVT ppx Lovenox  Dispo: remains acute on 5L N[place]. Pleurx to be placed Monday.

## 2021-12-30 NOTE — PROGRESS NOTE ADULT - PROBLEM SELECTOR PLAN 1
s/p left pigtail on 12/23/21  Maintain left pigtail to suction as per Dr. Sow.  Encourage the use of I/S, CDBE, OOB to chair, daily PT, chest PT  Continue Duonebs  CXR in AM  currently remains on 5-6L NC  Plan for pleurex catheter monday in OR under sedation  needs repeat covid and type and screen on sunday 1/2  Discussed plan with Dr. Sow in AM rounds

## 2021-12-30 NOTE — PROGRESS NOTE ADULT - SUBJECTIVE AND OBJECTIVE BOX
Subjective: "my breathing has been feeling much better" denies CP, palpitations, cough, fever, chills, itchiness/rash, diaphoresis, vision changes, HA, dizziness/lightheadedness, numbness/tingling, abd pain, N/V     T(C): 36.7 (12-30-21 @ 10:00), Max: 37.1 (12-29-21 @ 23:41)  HR: 73 (12-30-21 @ 10:00) (61 - 92)  BP: 151/92 (12-30-21 @ 10:00) (132/86 - 151/92)  RR: 20 (12-30-21 @ 10:00) (18 - 20)  SpO2: 97% (12-30-21 @ 10:00) (95% - 97%)    12-30    140  |  106  |  32.3<H>  ----------------------------<  152<H>  5.2   |  24.0  |  0.71    Ca    7.5<L>      30 Dec 2021 06:17  Mg     2.6     12-30    TPro  5.5<L>  /  Alb  2.9<L>  /  TBili  0.6  /  DBili  x   /  AST  53<H>  /  ALT  54<H>  /  AlkPhos  275<H>  12-30                               11.9   6.72  )-----------( 433      ( 30 Dec 2021 06:15 )             36.8        I&O's Detail    29 Dec 2021 07:01  -  30 Dec 2021 07:00  --------------------------------------------------------  IN:    Oral Fluid: 480 mL  Total IN: 480 mL    OUT:    Chest Tube (mL): 150 mL    Voided (mL): 2100 mL  Total OUT: 2250 mL  Total NET: -1770 mL    Drug Dosing Weight  Height (cm): 154.9 (22 Dec 2021 19:40)  Weight (kg): 76.2 (22 Dec 2021 19:40)  BMI (kg/m2): 31.8 (22 Dec 2021 19:40)  BSA (m2): 1.75 (22 Dec 2021 19:40)    MEDICATIONS  (STANDING):  enoxaparin Injectable 40 milliGRAM(s) SubCutaneous daily  escitalopram 5 milliGRAM(s) Oral daily  methylPREDNISolone sodium succinate Injectable 60 milliGRAM(s) IV Push every 6 hours  modafinil 100 milliGRAM(s) Oral daily  morphine ER Tablet 30 milliGRAM(s) Oral three times a day  pantoprazole    Tablet 40 milliGRAM(s) Oral before breakfast  polyethylene glycol 3350 17 Gram(s) Oral daily  senna 2 Tablet(s) Oral at bedtime    MEDICATIONS  (PRN):  acetaminophen     Tablet .. 650 milliGRAM(s) Oral every 6 hours PRN Temp greater or equal to 38C (100.4F), Mild Pain (1 - 3)  albuterol/ipratropium for Nebulization 3 milliLiter(s) Nebulizer every 6 hours PRN Shortness of Breath and/or Wheezing  Carisoprodol 250 milliGRAM(s) 250 milliGRAM(s) Oral four times a day PRN muscle spasm  guaiFENesin Oral Liquid (Sugar-Free) 200 milliGRAM(s) Oral every 6 hours PRN Cough  HYDROmorphone  Injectable 1 milliGRAM(s) IV Push every 4 hours PRN Severe Pain (7 - 10)  HYDROmorphone  Injectable 0.5 milliGRAM(s) IV Push every 4 hours PRN Moderate Pain (4 - 6)  LORazepam   Injectable 0.5 milliGRAM(s) IV Push three times a day PRN Anxiety  ondansetron Injectable 4 milliGRAM(s) IV Push every 6 hours PRN Nausea and/or Vomiting  promethazine 12.5 milliGRAM(s) Oral two times a day PRN nausea    Physical Exam  Gen: NAD  Neuro: A&Ox3 non focal speech clear and intact  Pulm: crackles b/l no wheezing  CV: S1S2 RRR  Abd: +BS soft NT ND  Extrem/MS: no edema

## 2021-12-30 NOTE — PROGRESS NOTE ADULT - ASSESSMENT
36F,  with recently diagnosed Metastatic Right Breast CA (Infiltrating Ductal Carcinoma with mets to the Spine and Liver), Malignant Pleural Effusion on Home O2 (3-5 liters), Pathologic Compression Fractures (Follows with Dr. Santana), Anxiety, and Muscle Spasms who presents to the ED with complaints of worsening SOB and found to have bilateral pleural effusions, left sided opacities and worsening lymphangitic spread of disease. Patient requiring BiPAP on arrival for oxygenation, currently switched to HFNC. CT spine also showing new acute T2 and T7 compression fractures, bony mets.  Thoracic surgery consulted for pleural effusion.  On 12/23 left pigtail inserted for left pleural effusion.  12/28 s/p pericardiocentesis 250cc serous fluid, repeat TTE with resolution of effusion 12/29 L pigtail dislodged

## 2021-12-30 NOTE — PROGRESS NOTE ADULT - ASSESSMENT
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 Santana), Anxiety, and Muscle Spasms who presents to the ED with complaints of worsening SOB.    CT angio was negative for PE, but revealed bilateral effusions (larger on the left), worsening lymphangitic spread and left sided opacities (Atelectasis vs PNA).    pt s/p chest tube with continued serous fluid   pt more comfortable now; sitting in bed   no fevers.  On 6L O2 NC    Metastatic Her 2 + breast ca   - poor tolerance to Perjeta   - rapidly progressive disease with malignant pleural effusion and lymphangitic spread with resp compromise   - started chemo with Taxotere + Herceptin 12/27 and tolerated well.  - close pulm f/u .  High dose steroids.  Solumedrol 60mg IV Q6hrs       Malignant Pleural / pericardial effusion   - s/p Left chest tube - CT surgery on board  - s/p pericardiocentesis and she is feeling better.    Compression fractures - ortho on board. Plan if for CT spine. Pending.    Constipation - management as per primary team.    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 Santana), Anxiety, and Muscle Spasms who presents to the ED with complaints of worsening SOB.    CT angio was negative for PE, but revealed bilateral effusions (larger on the left), worsening lymphangitic spread and left sided opacities (Atelectasis vs PNA).    pt s/p chest tube with continued serous fluid , now Chest tube removed  pt more comfortable now; sitting in bed   no fevers.  On 6L O2 NC    Metastatic Her 2 + breast ca   - poor tolerance to Perjeta   - rapidly progressive disease with malignant pleural effusion and lymphangitic spread with resp compromise   - started chemo with Taxotere + Herceptin 12/27 and tolerated well.  - close pulm f/u .  High dose steroids.  Solumedrol 60mg IV Q6hrs       Malignant Pleural / pericardial effusion   - s/p Left chest tube -now dislodged  PLan for pleurex catheter on 1/3  - s/p pericardiocentesis and she is feeling better.    Compression fractures - ortho on board. Plan if for CT spine. Pending.    Constipation - management as per primary team.

## 2021-12-30 NOTE — PROGRESS NOTE ADULT - SUBJECTIVE AND OBJECTIVE BOX
MRN: [id number]      Patient is a 36 year old female being followed for Pathologic compression fx of C3, C4, C6, T2 and T7, and L1 compression fx. .  patient denies any weakness or numbness today.  patient state she would prefer to have MRI done outpatient. no orthopedic complaints      PHYSICAL EXAM:    Vital Signs Last 24 Hrs  T(C): 36.6 (30 Dec 2021 03:59), Max: 37.2 (29 Dec 2021 10:11)  T(F): 97.9 (30 Dec 2021 03:59), Max: 99 (29 Dec 2021 10:11)  HR: 68 (30 Dec 2021 04:00) (61 - 92)  BP: 134/87 (30 Dec 2021 03:59) (132/86 - 136/84)  BP(mean): 103 (30 Dec 2021 03:59) (101 - 103)  RR: 20 (30 Dec 2021 03:59) (17 - 20)  SpO2: 95% (30 Dec 2021 04:00) (95% - 97%)    Appearance: Alert, responsive, in no acute distress.    Skin: no rash on visible skin. Skin is clean, dry and intact. No bleeding. No abrasions. No ulcerations.    Neuro: Sensation in tact to light touch x 4      Motor exam:          Upper extremities -   5/5 C5-T1 bilaterally without deficit           Lower extremities  -   5/5 L2-S1 bilaterally without deficit    < from: CT Lumbar Spine No Cont (12.29.21 @ 14:25) >    ACC: [id number] EXAM:  CT LUMBAR SPINE                          PROCEDURE DATE:  12/29/2021          INTERPRETATION:  CLINICAL HISTORY: Osseous metastasis, metastatic breast   cancer    COMPARISON: CT lumbar spine 11/30/2021    TECHNIQUE: CT lumbar spine was performed without the administration of IV   contrast. Multiplanar reformations are submitted. 3-D images.    FINDINGS:  Diffuse osseous metastasis are redemonstrated involving the anterior   posterior elements of the lumbar spine. New mild T12 and L1 pathologic   compression fractures with approximately 10% height loss respectively. No   fracture fragment retropulsion. Remaining vertebral body heights and   maintained. The visualized SI joints are symmetric. Diffuse pelvic   osseous metastasis. Left lower lobe chest tube is partially visualized.    Evaluation of the individual levels:  L1/L2 level: No disc herniation, spinal canal stenosis, foraminal   narrowing.  L2/L3 level: Mild disc bulge. No spinal canal stenosis or foraminal   narrowing  L3/L4 level: Mild disc bulge. No spinal canal stenosis or foraminal   narrowing  L4/L5 level: Mild disc bulge. No spinal canal stenosis or foraminal   narrowing  L5/S1 level: Mild disc bulge. No spinal canal stenosis or foraminal   narrowing    Patient's known hepatic metastasis is not well-visualized.   Intra-abdominal adenopathy is suspected. Consider contrast-enhanced CT as   clinically warranted    IMPRESSION: Diffuse osseous metastasis. New mild pathologic compression   fractures involving T12 and L1. No fracture fragment retropulsion    --- End of Report ---      < end of copied text >    < from: CT Cervical Spine No Cont (12.29.21 @ 14:25) >  ACC: [id number] EXAM:  CT CERVICAL SPINE                          PROCEDURE DATE:  12/29/2021          INTERPRETATION:  CLINICAL INDICATIONS: Metastatic breast cancerh/o   compression fractures    COMPARISON: CT C-spine 11/3/2021    TECHNIQUE: Noncontrast CT of the cervical spine. Multiple contiguous   axial images through the cervical spine as well as multiplanar   reformatted images are submitted for interpretation without the   administration of intravenous contrast. 3-D images.    FINDINGS:  Redemonstrated diffuse osseous metastasis involving the anterior   posterior elements throughout the visualized spine.. Severe pathologic   compression fracture of C3 is redemonstrated with mild interval height   loss and stable mild retropulsion of fracture fragments. No significant   spinal canal stenosis at this level. Moderate chronic pathologic   compression fracture of C6 with mild interval height loss. Acute mild C4   compression fracture with 15 % height loss, new as compared to prior   study. New moderate T2 pathologic compression fracture with mild   retropulsion of fracture fragments.    A normal lordosis is noted. Craniocervical junction is normal. The   cervicovertebral body heights and intervertebral disc spaces are   preserved. There is no prevertebral soft tissue abnormality.    Thyroid gland is unremarkable. The airway is patent. Diffuse bilateral   upper lobe airspace disease. Right-sided MediPort catheter is noted.     Evaluation of the individual levels:  C2/C3 level: No spinal canal stenosis or foraminal narrowing.  C3/C4 level: Small right of midline disc herniation. No spinal canal   stenosis or foraminal narrowing.  C4/C5 level: No spinal canal stenosis or foraminal narrowing.  C5/C6 level: No spinal canal stenosis or foraminal narrowing.  C6/C7 level: Broad-based disc herniation. No spinal canal stenosis or   foraminal narrowing  C7/T1 level: No spinal canal stenosis or foraminal narrowing.      IMPRESSION:    New pathologic compression fracture deformitiesinvolving the C4 and T2   vertebral bodies. Moderate to severe chronic pathologic compression   fracture deformities at C3 and C6 with increased height loss as compared   to the prior study. Severe diffuse osseous metastasis. No significant   spinal canal stenosis. Additional findings above. Consider MRI as   clinically warranted.    --- End of Report ---            TAYLOR BOWIE MD; Attending Radiologist  This document has been electronically signed. Dec 29 2021  2:37PM    < end of copied text >      A/P:  Pt is a 36y Female with C3, C4, C6, T2 and T7, and L1 compression fx.     PLAN:   Dr. Santana aware of above  awaiting further planning from attending

## 2021-12-31 LAB
ALBUMIN SERPL ELPH-MCNC: 2.9 G/DL — LOW (ref 3.3–5.2)
ALP SERPL-CCNC: 247 U/L — HIGH (ref 40–120)
ALT FLD-CCNC: 45 U/L — HIGH
ANION GAP SERPL CALC-SCNC: 11 MMOL/L — SIGNIFICANT CHANGE UP (ref 5–17)
AST SERPL-CCNC: 36 U/L — HIGH
BILIRUB SERPL-MCNC: 0.7 MG/DL — SIGNIFICANT CHANGE UP (ref 0.4–2)
BUN SERPL-MCNC: 29.3 MG/DL — HIGH (ref 8–20)
CALCIUM SERPL-MCNC: 7.8 MG/DL — LOW (ref 8.6–10.2)
CHLORIDE SERPL-SCNC: 103 MMOL/L — SIGNIFICANT CHANGE UP (ref 98–107)
CO2 SERPL-SCNC: 23 MMOL/L — SIGNIFICANT CHANGE UP (ref 22–29)
CREAT SERPL-MCNC: 0.69 MG/DL — SIGNIFICANT CHANGE UP (ref 0.5–1.3)
GLUCOSE SERPL-MCNC: 152 MG/DL — HIGH (ref 70–99)
HCT VFR BLD CALC: 37.2 % — SIGNIFICANT CHANGE UP (ref 34.5–45)
HGB BLD-MCNC: 11.9 G/DL — SIGNIFICANT CHANGE UP (ref 11.5–15.5)
MAGNESIUM SERPL-MCNC: 2.3 MG/DL — SIGNIFICANT CHANGE UP (ref 1.6–2.6)
MCHC RBC-ENTMCNC: 27.5 PG — SIGNIFICANT CHANGE UP (ref 27–34)
MCHC RBC-ENTMCNC: 32 GM/DL — SIGNIFICANT CHANGE UP (ref 32–36)
MCV RBC AUTO: 86.1 FL — SIGNIFICANT CHANGE UP (ref 80–100)
PLATELET # BLD AUTO: 448 K/UL — HIGH (ref 150–400)
POTASSIUM SERPL-MCNC: 5 MMOL/L — SIGNIFICANT CHANGE UP (ref 3.5–5.3)
POTASSIUM SERPL-SCNC: 5 MMOL/L — SIGNIFICANT CHANGE UP (ref 3.5–5.3)
PROT SERPL-MCNC: 5.5 G/DL — LOW (ref 6.6–8.7)
RBC # BLD: 4.32 M/UL — SIGNIFICANT CHANGE UP (ref 3.8–5.2)
RBC # FLD: 13.3 % — SIGNIFICANT CHANGE UP (ref 10.3–14.5)
SODIUM SERPL-SCNC: 137 MMOL/L — SIGNIFICANT CHANGE UP (ref 135–145)
WBC # BLD: 6.61 K/UL — SIGNIFICANT CHANGE UP (ref 3.8–10.5)
WBC # FLD AUTO: 6.61 K/UL — SIGNIFICANT CHANGE UP (ref 3.8–10.5)

## 2021-12-31 PROCEDURE — 72146 MRI CHEST SPINE W/O DYE: CPT | Mod: 26

## 2021-12-31 PROCEDURE — 99231 SBSQ HOSP IP/OBS SF/LOW 25: CPT

## 2021-12-31 PROCEDURE — 99232 SBSQ HOSP IP/OBS MODERATE 35: CPT

## 2021-12-31 PROCEDURE — 72148 MRI LUMBAR SPINE W/O DYE: CPT | Mod: 26

## 2021-12-31 PROCEDURE — 72141 MRI NECK SPINE W/O DYE: CPT | Mod: 26

## 2021-12-31 PROCEDURE — 71045 X-RAY EXAM CHEST 1 VIEW: CPT | Mod: 26

## 2021-12-31 RX ORDER — MORPHINE SULFATE 50 MG/1
30 CAPSULE, EXTENDED RELEASE ORAL THREE TIMES A DAY
Refills: 0 | Status: DISCONTINUED | OUTPATIENT
Start: 2021-12-31 | End: 2022-01-03

## 2021-12-31 RX ORDER — LANOLIN ALCOHOL/MO/W.PET/CERES
3 CREAM (GRAM) TOPICAL ONCE
Refills: 0 | Status: COMPLETED | OUTPATIENT
Start: 2021-12-31 | End: 2021-12-31

## 2021-12-31 RX ORDER — HYDROMORPHONE HYDROCHLORIDE 2 MG/ML
1 INJECTION INTRAMUSCULAR; INTRAVENOUS; SUBCUTANEOUS EVERY 4 HOURS
Refills: 0 | Status: DISCONTINUED | OUTPATIENT
Start: 2021-12-31 | End: 2022-01-03

## 2021-12-31 RX ORDER — HYDROMORPHONE HYDROCHLORIDE 2 MG/ML
0.5 INJECTION INTRAMUSCULAR; INTRAVENOUS; SUBCUTANEOUS EVERY 4 HOURS
Refills: 0 | Status: DISCONTINUED | OUTPATIENT
Start: 2021-12-31 | End: 2022-01-03

## 2021-12-31 RX ADMIN — MORPHINE SULFATE 30 MILLIGRAM(S): 50 CAPSULE, EXTENDED RELEASE ORAL at 23:25

## 2021-12-31 RX ADMIN — Medication 60 MILLIGRAM(S): at 22:55

## 2021-12-31 RX ADMIN — HYDROMORPHONE HYDROCHLORIDE 1 MILLIGRAM(S): 2 INJECTION INTRAMUSCULAR; INTRAVENOUS; SUBCUTANEOUS at 01:02

## 2021-12-31 RX ADMIN — ONDANSETRON 4 MILLIGRAM(S): 8 TABLET, FILM COATED ORAL at 18:47

## 2021-12-31 RX ADMIN — ESCITALOPRAM OXALATE 5 MILLIGRAM(S): 10 TABLET, FILM COATED ORAL at 12:14

## 2021-12-31 RX ADMIN — HYDROMORPHONE HYDROCHLORIDE 1 MILLIGRAM(S): 2 INJECTION INTRAMUSCULAR; INTRAVENOUS; SUBCUTANEOUS at 04:15

## 2021-12-31 RX ADMIN — MORPHINE SULFATE 30 MILLIGRAM(S): 50 CAPSULE, EXTENDED RELEASE ORAL at 00:00

## 2021-12-31 RX ADMIN — MORPHINE SULFATE 30 MILLIGRAM(S): 50 CAPSULE, EXTENDED RELEASE ORAL at 13:27

## 2021-12-31 RX ADMIN — SENNA PLUS 2 TABLET(S): 8.6 TABLET ORAL at 22:54

## 2021-12-31 RX ADMIN — Medication 60 MILLIGRAM(S): at 05:11

## 2021-12-31 RX ADMIN — HYDROMORPHONE HYDROCHLORIDE 1 MILLIGRAM(S): 2 INJECTION INTRAMUSCULAR; INTRAVENOUS; SUBCUTANEOUS at 20:28

## 2021-12-31 RX ADMIN — MORPHINE SULFATE 30 MILLIGRAM(S): 50 CAPSULE, EXTENDED RELEASE ORAL at 05:10

## 2021-12-31 RX ADMIN — PANTOPRAZOLE SODIUM 40 MILLIGRAM(S): 20 TABLET, DELAYED RELEASE ORAL at 05:10

## 2021-12-31 RX ADMIN — ENOXAPARIN SODIUM 40 MILLIGRAM(S): 100 INJECTION SUBCUTANEOUS at 12:14

## 2021-12-31 RX ADMIN — HYDROMORPHONE HYDROCHLORIDE 1 MILLIGRAM(S): 2 INJECTION INTRAMUSCULAR; INTRAVENOUS; SUBCUTANEOUS at 09:26

## 2021-12-31 RX ADMIN — ONDANSETRON 4 MILLIGRAM(S): 8 TABLET, FILM COATED ORAL at 09:25

## 2021-12-31 RX ADMIN — Medication 60 MILLIGRAM(S): at 18:41

## 2021-12-31 RX ADMIN — Medication 0.5 MILLIGRAM(S): at 13:27

## 2021-12-31 RX ADMIN — Medication 3 MILLIGRAM(S): at 00:22

## 2021-12-31 RX ADMIN — HYDROMORPHONE HYDROCHLORIDE 1 MILLIGRAM(S): 2 INJECTION INTRAMUSCULAR; INTRAVENOUS; SUBCUTANEOUS at 17:05

## 2021-12-31 RX ADMIN — Medication 60 MILLIGRAM(S): at 12:13

## 2021-12-31 RX ADMIN — MORPHINE SULFATE 30 MILLIGRAM(S): 50 CAPSULE, EXTENDED RELEASE ORAL at 22:55

## 2021-12-31 RX ADMIN — Medication 3 MILLIGRAM(S): at 22:55

## 2021-12-31 RX ADMIN — HYDROMORPHONE HYDROCHLORIDE 1 MILLIGRAM(S): 2 INJECTION INTRAMUSCULAR; INTRAVENOUS; SUBCUTANEOUS at 04:06

## 2021-12-31 RX ADMIN — HYDROMORPHONE HYDROCHLORIDE 1 MILLIGRAM(S): 2 INJECTION INTRAMUSCULAR; INTRAVENOUS; SUBCUTANEOUS at 20:58

## 2021-12-31 NOTE — PROGRESS NOTE ADULT - SUBJECTIVE AND OBJECTIVE BOX
[facility] Division of Hospital Medicine    Chief Complaint:  Shortness of breath    SUBJECTIVE / OVERNIGHT EVENTS: Patient seen and examined. Reports her breathing is good, currently on 3L NC. She had right breast swelling overnight that improved with Ice pack. She ia agreeable to go for MRIs.     Patient denies chest pain, SOB, abd pain, N/V, fever, chills, dysuria or any other complaints. All remainder ROS negative.     MEDICATIONS  (STANDING):  enoxaparin Injectable 40 milliGRAM(s) SubCutaneous daily  escitalopram 5 milliGRAM(s) Oral daily  methylPREDNISolone sodium succinate Injectable 60 milliGRAM(s) IV Push every 6 hours  morphine ER Tablet 30 milliGRAM(s) Oral three times a day  pantoprazole    Tablet 40 milliGRAM(s) Oral before breakfast  polyethylene glycol 3350 17 Gram(s) Oral daily  senna 2 Tablet(s) Oral at bedtime    MEDICATIONS  (PRN):  acetaminophen     Tablet .. 650 milliGRAM(s) Oral every 6 hours PRN Temp greater or equal to 38C (100.4F), Mild Pain (1 - 3)  albuterol/ipratropium for Nebulization 3 milliLiter(s) Nebulizer every 6 hours PRN Shortness of Breath and/or Wheezing  Carisoprodol 250 milliGRAM(s) 250 milliGRAM(s) Oral four times a day PRN muscle spasm  guaiFENesin Oral Liquid (Sugar-Free) 200 milliGRAM(s) Oral every 6 hours PRN Cough  HYDROmorphone  Injectable 0.5 milliGRAM(s) IV Push every 4 hours PRN Moderate Pain (4 - 6)  HYDROmorphone  Injectable 1 milliGRAM(s) IV Push every 4 hours PRN Severe Pain (7 - 10)  LORazepam   Injectable 0.5 milliGRAM(s) IV Push three times a day PRN Anxiety  ondansetron Injectable 4 milliGRAM(s) IV Push every 6 hours PRN Nausea and/or Vomiting  promethazine 12.5 milliGRAM(s) Oral two times a day PRN nausea        I&O's Summary    30 Dec 2021 07:01  -  31 Dec 2021 07:00  --------------------------------------------------------  IN: 0 mL / OUT: 300 mL / NET: -300 mL        PHYSICAL EXAM:  Vital Signs Last 24 Hrs  T(C): 36.9 (31 Dec 2021 12:00), Max: 36.9 (31 Dec 2021 12:00)  T(F): 98.4 (31 Dec 2021 12:00), Max: 98.4 (31 Dec 2021 12:00)  HR: 78 (31 Dec 2021 12:00) (60 - 80)  BP: 130/82 (31 Dec 2021 12:00) (130/82 - 148/88)  BP(mean): 99 (31 Dec 2021 04:28) (99 - 108)  RR: 20 (31 Dec 2021 12:00) (18 - 20)  SpO2: 98% (31 Dec 2021 12:00) (97% - 100%)      CONSTITUTIONAL: NAD,   ENMT: Moist oral mucosa, no pharyngeal injection or exudates; normal dentition  RESPIRATORY: Normal respiratory effort; on NC 3LDecreased breath sounds in the left lung base.   CARDIOVASCULAR: Regular rate and rhythm, normal S1 and S2, ; No lower extremity edema;  ABDOMEN: Nontender to palpation, normoactive bowel sounds, no rebound/guarding  MUSCLOSKELETAL: no clubbing or cyanosis of digits; no joint swelling or tenderness to palpation  PSYCH: A+O to person, place, and time; affect appropriate  NEUROLOGY: CN 2-12 are intact and symmetric; no gross sensory deficits;   SKIN: No rashes; no palpable lesions    LABS:                        11.9   6.61  )-----------( 448      ( 31 Dec 2021 05:53 )             37.2     12-31    137  |  103  |  29.3<H>  ----------------------------<  152<H>  5.0   |  23.0  |  0.69    Ca    7.8<L>      31 Dec 2021 05:55  Mg     2.3     12-31    TPro  5.5<L>  /  Alb  2.9<L>  /  TBili  0.7  /  DBili  x   /  AST  36<H>  /  ALT  45<H>  /  AlkPhos  247<H>  12-31              Culture - Acid Fast - Body Fluid w/Smear (collected 28 Dec 2021 12:56)  Source: .Body Fluid Pericardial Fluid  Preliminary Report (29 Dec 2021 15:05):    Culture is being performed.    Culture - Body Fluid with Gram Stain (collected 28 Dec 2021 12:55)  Source: .Body Fluid Pericardial Fluid  Gram Stain (28 Dec 2021 22:15):    No polymorphonuclear leukocytes seen per low power field    No organisms seen per oil power field  Preliminary Report (29 Dec 2021 12:57):    No growth    Culture - Fungal, Body Fluid (collected 28 Dec 2021 12:55)  Source: .Body Fluid Pericardial Fluid  Preliminary Report (29 Dec 2021 06:43):    Testing in progress      CAPILLARY BLOOD GLUCOSE            RADIOLOGY & ADDITIONAL TESTS:  Results Reviewed:   Imaging Personally Reviewed:  Electrocardiogram Personally Reviewed:

## 2021-12-31 NOTE — PROGRESS NOTE ADULT - SUBJECTIVE AND OBJECTIVE BOX
Subjective: no complaints, reports feeling better denies CP, palpitations, SOB, cough, fever, chills, itchiness/rash, diaphoresis, vision changes, HA, dizziness/lightheadedness, numbness/tingling, abd pain, N/V     T(C): 36.4 (12-31-21 @ 04:28), Max: 36.8 (12-30-21 @ 15:43)  HR: 70 (12-31-21 @ 04:28) (60 - 71)  BP: 130/83 (12-31-21 @ 04:28) (130/83 - 148/88)  RR: 20 (12-31-21 @ 04:28) (18 - 20)  SpO2: 97% (12-31-21 @ 04:28) (97% - 100%)    12-31    137  |  103  |  29.3<H>  ----------------------------<  152<H>  5.0   |  23.0  |  0.69    Ca    7.8<L>      31 Dec 2021 05:55  Mg     2.3     12-31    TPro  5.5<L>  /  Alb  2.9<L>  /  TBili  0.7  /  DBili  x   /  AST  36<H>  /  ALT  45<H>  /  AlkPhos  247<H>  12-31                               11.9   6.61  )-----------( 448      ( 31 Dec 2021 05:53 )             37.2     I&O's Detail    30 Dec 2021 07:01  -  31 Dec 2021 07:00  --------------------------------------------------------  IN:  Total IN: 0 mL    OUT:    Voided (mL): 300 mL  Total OUT: 300 mL  Total NET: -300 mL    Drug Dosing Weight  Height (cm): 154.9 (22 Dec 2021 19:40)  Weight (kg): 76.2 (22 Dec 2021 19:40)  BMI (kg/m2): 31.8 (22 Dec 2021 19:40)  BSA (m2): 1.75 (22 Dec 2021 19:40)    MEDICATIONS  (STANDING):  enoxaparin Injectable 40 milliGRAM(s) SubCutaneous daily  escitalopram 5 milliGRAM(s) Oral daily  methylPREDNISolone sodium succinate Injectable 60 milliGRAM(s) IV Push every 6 hours  morphine ER Tablet 30 milliGRAM(s) Oral three times a day  pantoprazole    Tablet 40 milliGRAM(s) Oral before breakfast  polyethylene glycol 3350 17 Gram(s) Oral daily  senna 2 Tablet(s) Oral at bedtime    MEDICATIONS  (PRN):  acetaminophen     Tablet .. 650 milliGRAM(s) Oral every 6 hours PRN Temp greater or equal to 38C (100.4F), Mild Pain (1 - 3)  albuterol/ipratropium for Nebulization 3 milliLiter(s) Nebulizer every 6 hours PRN Shortness of Breath and/or Wheezing  Carisoprodol 250 milliGRAM(s) 250 milliGRAM(s) Oral four times a day PRN muscle spasm  guaiFENesin Oral Liquid (Sugar-Free) 200 milliGRAM(s) Oral every 6 hours PRN Cough  HYDROmorphone  Injectable 0.5 milliGRAM(s) IV Push every 4 hours PRN Moderate Pain (4 - 6)  HYDROmorphone  Injectable 1 milliGRAM(s) IV Push every 4 hours PRN Severe Pain (7 - 10)  LORazepam   Injectable 0.5 milliGRAM(s) IV Push three times a day PRN Anxiety  ondansetron Injectable 4 milliGRAM(s) IV Push every 6 hours PRN Nausea and/or Vomiting  promethazine 12.5 milliGRAM(s) Oral two times a day PRN nausea    Physical Exam  Gen: NAD  Neuro: A&Ox3 non focal speech clear and intact  Pulm: crackles on left, diminished on right no wheezing  CV: S1S2 RRR  Abd: +BS soft NT ND  Extrem/MS: no edema

## 2021-12-31 NOTE — PROGRESS NOTE ADULT - SUBJECTIVE AND OBJECTIVE BOX
Patient is a 36y old  Female who presents with a chief complaint of SOB (24 Dec 2021 12:20)      HPI:  Patient is a 36 year old female with recently diagnosed Metastatic Right Breast CA (Infiltrating Ductal Carcinoma with mets to the Spine and Liver), Malignant Pleural Effusion on Home O2 (3-5 liters), Pathologic Compression Fractures (Follows with Dr. Santana), Anxiety, and Muscle Spasms who presents to the ED with complaints of worsening SOB. Patient was recently admitted in [place] from 12/2-12/13 for SOB following her first dose of Perjeta.  She required a left sided chest tube.  Patient states she was discharged on home O2 and was doing relatively well until today when she acutely became more dyspneic. Pulse ox was 84% on 5 liters and patient reports feeling an overwhelming feeling of doom and chest pressure. Denies any recent fevers or chills, but does report a productive cough (intermittently yellow). CT angio was negative for PE, but revealed bilateral effusions (larger on the left), worsening lymphangitic spread and left sided opacities (Atelectasis vs PNA).  In the ED, patient was initially placed on bipap and seen by MICU who switched the patient to [place] and recommended admission to step down unit. Patient seen and examined, reports SOB has improved since initial presentation but visibly appears anxious. Complaining of pleuritic right sided chest pain when coughing and chronic back pain for which she follows with Dr. Santana. Denies lightheadedness, dizziness, chest pain at this time, vomiting, abdominal pain or diarrhea. Otherwise, reports mild nausea and weakness.  (22 Dec 2021 23:55)     12/31  L chest tube dislodged  got chemotherapy on 12/27/2021 - early evening.  underwent pericardiocentesis 12/27 and breathing is better.  She is now on 5L 02 NC  afebrile, episode of anxiety ON requiring Ativan.    MEDICATIONS  (STANDING):  enoxaparin Injectable 40 milliGRAM(s) SubCutaneous daily  escitalopram 5 milliGRAM(s) Oral daily  methylPREDNISolone sodium succinate Injectable 60 milliGRAM(s) IV Push every 6 hours  modafinil 100 milliGRAM(s) Oral daily  morphine ER Tablet 30 milliGRAM(s) Oral three times a day  polyethylene glycol 3350 17 Gram(s) Oral daily  senna 2 Tablet(s) Oral at bedtime    MEDICATIONS  (PRN):  acetaminophen     Tablet .. 650 milliGRAM(s) Oral every 6 hours PRN Temp greater or equal to 38C (100.4F), Mild Pain (1 - 3)  albuterol/ipratropium for Nebulization 3 milliLiter(s) Nebulizer every 6 hours PRN Shortness of Breath and/or Wheezing  Carisoprodol 250 milliGRAM(s) 250 milliGRAM(s) Oral four times a day PRN muscle spasm  guaiFENesin Oral Liquid (Sugar-Free) 200 milliGRAM(s) Oral every 6 hours PRN Cough  HYDROmorphone  Injectable 1 milliGRAM(s) IV Push every 4 hours PRN Severe Pain (7 - 10)  HYDROmorphone  Injectable 0.5 milliGRAM(s) IV Push every 4 hours PRN Moderate Pain (4 - 6)  LORazepam   Injectable 0.5 milliGRAM(s) IV Push three times a day PRN Anxiety  promethazine 12.5 milliGRAM(s) Oral two times a day PRN nausea      ICU Vital Signs Last 24 Hrs  T(C): 36.4 (31 Dec 2021 04:28), Max: 36.8 (30 Dec 2021 15:43)  T(F): 97.6 (31 Dec 2021 04:28), Max: 98.3 (30 Dec 2021 15:43)  HR: 70 (31 Dec 2021 04:28) (60 - 73)  BP: 130/83 (31 Dec 2021 04:28) (130/83 - 151/92)  BP(mean): 99 (31 Dec 2021 04:28) (99 - 108)  ABP: --  ABP(mean): --  RR: 20 (31 Dec 2021 04:28) (18 - 20)  SpO2: 97% (31 Dec 2021 04:28) (97% - 100%)      PHYSICAL EXAM  General: adult in NAD  HEENT: clear oropharynx, anicteric sclera, pink conjunctiva  Neck: supple  CV: normal S1/S2 with no murmur rubs or gallops  Lungs: left chest tube with serous fluid   Abdomen: soft non-tender non-distended, no hepatosplenomegaly  Ext: no clubbing cyanosis or edema  Skin: no rashes and no petechiae  Neuro: alert and oriented X 4, no focal deficits      LABS:                          11.9   6.61  )-----------( 448      ( 31 Dec 2021 05:53 )             37.2                             11.9   6.72  )-----------( 433      ( 30 Dec 2021 06:15 )             36.8                             11.6   8.38  )-----------( 465      ( 29 Dec 2021 07:06 )             36.0                           11.8   11.52 )-----------( 518      ( 27 Dec 2021 05:40 )             37.6        10.57  H/H 11/34  plt ct 493  (12/26/2021)   7.57  H/H 11.5/36.8 plt ct 484,000 (12/25/2021)                11.0   11.28 )-----------( 471      ( 24 Dec 2021 06:03 )             35.0     12-31    137  |  103  |  29.3<H>  ----------------------------<  152<H>  5.0   |  23.0  |  0.69    Ca    7.8<L>      31 Dec 2021 05:55  Mg     2.3     12-31    TPro  5.5<L>  /  Alb  2.9<L>  /  TBili  0.7  /  DBili  x   /  AST  36<H>  /  ALT  45<H>  /  AlkPhos  247<H>  12-31 12-30    140  |  106  |  32.3<H>  ----------------------------<  152<H>  5.2   |  24.0  |  0.71    Ca    7.5<L>      30 Dec 2021 06:17  Mg     2.6     12-30    TPro  5.5<L>  /  Alb  2.9<L>  /  TBili  0.6  /  DBili  x   /  AST  53<H>  /  ALT  54<H>  /  AlkPhos  275<H>  12-30 12-29    141  |  106  |  34.1<H>  ----------------------------<  151<H>  4.8   |  24.0  |  0.82    Ca    7.3<L>      29 Dec 2021 07:06  Mg     2.6     12-29    TPro  6.0<L>  /  Alb  3.0<L>  /  TBili  0.4  /  DBili  x   /  AST  35<H>  /  ALT  34<H>  /  AlkPhos  352<H>  12-28 12-27    137  |  101  |  30.7<H>  ----------------------------<  159<H>  4.8   |  24.0  |  1.26    Ca    8.7      27 Dec 2021 05:40  Mg     2.6     12-27    TPro  6.2<L>  /  Alb  3.0<L>  /  TBili  0.4  /  DBili  x   /  AST  27  /  ALT  24  /  AlkPhos  390<H>  12-27 12-24    134<L>  |  98  |  14.6  ----------------------------<  103<H>  3.5   |  22.0  |  0.81    Ca    8.9      24 Dec 2021 06:01  Phos  3.9     12-23  Mg     2.2     12-23        PT/INR - ( 22 Dec 2021 20:59 )   PT: 15.5 sec;   INR: 1.36 ratio         PTT - ( 22 Dec 2021 20:59 )  PTT:36.7 sec

## 2021-12-31 NOTE — PROGRESS NOTE ADULT - ASSESSMENT
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 Santana), Anxiety, and Muscle Spasms who presents to the ED with complaints of worsening SOB.    CT angio was negative for PE, but revealed bilateral effusions (larger on the left), worsening lymphangitic spread and left sided opacities (Atelectasis vs PNA).    pt s/p chest tube with continued serous fluid , now Chest tube removed  no fevers.  On 5L O2 NC    Metastatic Her 2 + breast ca   - poor tolerance to Perjeta   - rapidly progressive disease with malignant pleural effusion and lymphangitic spread with resp compromise   - started chemo with Taxotere + Herceptin 12/27 and tolerated well.  - close pulm f/u .  High dose steroids.  Solumedrol 60mg IV Q6hrs       Malignant Pleural / pericardial effusion   - s/p Left chest tube -now dislodged  PLan for pleurex catheter on 1/3  - s/p pericardiocentesis and she is feeling better.    Compression fractures - ortho on board. Plan if for CT spine. Pending.    Constipation - management as per primary team.

## 2021-12-31 NOTE — PROVIDER CONTACT NOTE (OTHER) - SITUATION
patient complaining of anxiety; patient given ATIVAN PRN for anxiety without relief. patient complaint of anxiety; patient given ATIVAN PRN for anxiety without relief.

## 2021-12-31 NOTE — PROGRESS NOTE ADULT - PROBLEM SELECTOR PLAN 1
s/p left pigtail on 12/23/21--> dislodged 12/29  dressing saturated with serous fluid, changed  Encourage the use of I/S, CDBE, OOB to chair, daily PT, chest PT  Continue Duonebs  CXR sunday   currently remains on 5-6L NC  Plan for pleurex catheter monday in OR under sedation  needs repeat covid and type and screen on sunday 1/2  d/w Dr. Heredia

## 2021-12-31 NOTE — PROGRESS NOTE ADULT - ASSESSMENT
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 Santana), Anxiety, and Muscle Spasms who presents to the ED with complaints of worsening SOB and found to have bilateral pleural effusions, left sided opacities and worsening lymphangitic spread of disease. Reports recent admission 12/3 for same at [facility] - had CT pleural effusion drainage > 1lit. She was discharged home on 12/13 on 2-4 lit NC oxygen. She had chest tube placement on 12/23 per thoracic sx service with 1lit drained. ECHO - showed moderate to severe pericardial effusion with cardiac tamponade physiology. this was discussed extensively with cardio, thoracic sx for possible pericardial window - but at this time, she is not deemed a good candidate for this. Repeat Echo showed persistent pericardial effusion. S/ pericardiocentesis on 12/28. Patient also received inpatient  Chemo on 12/27.     Acute on Chronic Hypoxic Respiratory Failure likely due to Malignant exudative Effusions and worsening lymphangitic spread of disease, improving  by lights criteria effusion is Exudative   - s/p CT, was dislodged on 12/29. Planning for Pleurx on Monday 1/3/22.   -currently on NC 3L, wean as able  -CT angio negative for PE; bilateral pleural effusions (left > right), left sided opacities (atelectasis vs atypical PNA) and worsening lymphangitic spread of disease  -blood cultures negative  - Per ID dc abx as cultures are negative. Pneumonia ruled out.   -Bronchodilators and cough expectorant as needed  -anxiolytics and analgesia as needed  -incentive spirometry  -Pulm following   - Onc consult appreciated.   - Repeat CXR Sunday.     Metastatic, Infiltrating Ductal Carcinoma of the Right Breast  -with liver and bone mets (pathologic, compression fractures of C3 and T2/T7)  -underwent immunotherapy on 12/2 and developed a hypersensitivity reaction  -was scheduled for chemo on 12/27 with Dr. Chapa ([facility]).   - s/p inpatient Chemo Herceptin and Docetaxel on 12/27.   - Hem/onc following, continue on IV steroids. PPI while on steroids    Anxiety  -on Xanax at home  -Ativan prn  -continue escitalopram which was recently started    Back Pain due to Pathologic Compression Fractures  -known C3/T2 compression fractures  -CT angio with new T7 compression fracture with mild retropulsion   -Obtain dedicated imaging of T-spine  -MRIs reviewed from November 2021  -ortho spine consulted (Follows with Dr. Santana as outpatient)  - CT with C3, C4, C6, T2 and T7, and L1 compression fx.   -f/u  MRI spine pending.   -Continue Morphine ER 30 mg TID  -IV dilaudid for mod/severe pain as needed  -Continue muscle relaxer - on carisoprodol as needed    Moderate Pericardial Effusion  -likely malignant  -TNI negative; BNP within normal range  -patient reports effusion was small on recent TTE at [facility] on 12/12 or 12/13  -patient was seen by [facility] cardio at that time  - TTE - here - moderate pericardial effusion - possible early cardiac tamponade physiology - not a suitable candidate for pericardial window placement at this time per thoracic Sx.    - s/p pericardiocentesis on 12/28, 250cc serous fluid removed.  - will need repeat echo in 1 week    KURT, resolved  - encouraged hydration.     Constipation, resolved  - senna and Miralax    DVT ppx Lovenox  Dispo: remains acute on 3L [place]. Pleurx to be placed Monday.

## 2021-12-31 NOTE — PROGRESS NOTE ADULT - SUBJECTIVE AND OBJECTIVE BOX
ORTHO-TRAUMA SERVICE    Pt Name: NATIVIDAD MYERS    MRN: [id number]      Patient is a 36 year old female being followed for Pathologic compression fx of C3, C4, C6, T2 and T7, and L1 compression fx. Patient denies any weakness or numbness today.  Patient states she is not opposed to MRI while she inpatient if available but would defer outpatient IF the pending MRI is the only obstacle prohibiting her to be discharged. No orthopedic complaints      PAST MEDICAL & SURGICAL HISTORY:  PAST MEDICAL & SURGICAL HISTORY:  Breast CA    H/O compression fracture of spine    Anxiety    S/P tonsillectomy        Allergies: Perjeta (Other (Severe))  pertuzumab (Other (Severe))      Medications: acetaminophen     Tablet .. 650 milliGRAM(s) Oral every 6 hours PRN  albuterol/ipratropium for Nebulization 3 milliLiter(s) Nebulizer every 6 hours PRN  Carisoprodol 250 milliGRAM(s) 250 milliGRAM(s) Oral four times a day PRN  enoxaparin Injectable 40 milliGRAM(s) SubCutaneous daily  escitalopram 5 milliGRAM(s) Oral daily  guaiFENesin Oral Liquid (Sugar-Free) 200 milliGRAM(s) Oral every 6 hours PRN  HYDROmorphone  Injectable 0.5 milliGRAM(s) IV Push every 4 hours PRN  HYDROmorphone  Injectable 1 milliGRAM(s) IV Push every 4 hours PRN  LORazepam   Injectable 0.5 milliGRAM(s) IV Push three times a day PRN  methylPREDNISolone sodium succinate Injectable 60 milliGRAM(s) IV Push every 6 hours  morphine ER Tablet 30 milliGRAM(s) Oral three times a day  ondansetron Injectable 4 milliGRAM(s) IV Push every 6 hours PRN  pantoprazole    Tablet 40 milliGRAM(s) Oral before breakfast  polyethylene glycol 3350 17 Gram(s) Oral daily  promethazine 12.5 milliGRAM(s) Oral two times a day PRN  senna 2 Tablet(s) Oral at bedtime                          11.9   6.61  )-----------( 448      ( 31 Dec 2021 05:53 )             37.2         137  |  103  |  29.3<H>  ----------------------------<  152<H>  5.0   |  23.0  |  0.69    Ca    7.8<L>      31 Dec 2021 05:55  Mg     2.3         TPro  5.5<L>  /  Alb  2.9<L>  /  TBili  0.7  /  DBili  x   /  AST  36<H>  /  ALT  45<H>  /  AlkPhos  247<H>        PHYSICAL EXAM:    Vital Signs Last 24 Hrs  T(C): 36.4 (31 Dec 2021 04:28), Max: 36.8 (30 Dec 2021 15:43)  T(F): 97.6 (31 Dec 2021 04:28), Max: 98.3 (30 Dec 2021 15:43)  HR: 70 (31 Dec 2021 04:28) (60 - 71)  BP: 130/83 (31 Dec 2021 04:28) (130/83 - 148/88)  BP(mean): 99 (31 Dec 2021 04:28) (99 - 108)  RR: 20 (31 Dec 2021 04:28) (18 - 20)  SpO2: 97% (31 Dec 2021 04:28) (97% - 100%)  Daily     Daily Weight in k (31 Dec 2021 04:28)    Appearance: Alert, responsive, in no acute distress.    Neuro: Sensation in tact to light touch x 4    Skin: no rash on visible skin. Skin is clean, dry and intact. No bleeding. No abrasions. No ulcerations.    Motor exam:          Upper extremities -   5/5 C5-T1 bilaterally without deficit           Lower extremities  -   5/5 L2-S1 bilaterally without deficit    Imaging:  < from: CT Lumbar Spine No Cont (21 @ 14:25) >  ACC: [id number] EXAM:  CT LUMBAR SPINE                          PROCEDURE DATE:  2021          INTERPRETATION:  CLINICAL HISTORY: Osseous metastasis, metastatic breast   cancer    COMPARISON: CT lumbar spine 2021    TECHNIQUE: CT lumbar spine was performed without the administration of IV   contrast. Multiplanar reformations are submitted. 3-D images.    FINDINGS:  Diffuse osseous metastasis are redemonstrated involving the anterior   posterior elements of the lumbar spine. New mild T12 and L1 pathologic   compression fractures with approximately 10% height loss respectively. No   fracture fragment retropulsion. Remaining vertebral body heights and   maintained. The visualized SI joints are symmetric. Diffuse pelvic   osseous metastasis. Left lower lobe chest tube is partially visualized.    Evaluation of the individual levels:  L1/L2 level: No disc herniation, spinal canal stenosis, foraminal   narrowing.  L2/L3 level: Mild disc bulge. No spinal canal stenosis or foraminal   narrowing  L3/L4 level: Mild disc bulge. No spinal canal stenosis or foraminal   narrowing  L4/L5 level: Mild disc bulge. No spinal canal stenosis or foraminal   narrowing  L5/S1 level: Mild disc bulge. No spinal canal stenosis or foraminal   narrowing    Patient's known hepatic metastasis is not well-visualized.   Intra-abdominal adenopathy is suspected. Consider contrast-enhanced CT as   clinically warranted    IMPRESSION: Diffuse osseous metastasis. New mild pathologic compression   fractures involving T12 and L1. No fracture fragment retropulsion    --- End of Report ---      TAYLOR BOWIE MD; Attending Radiologist  This document has been electronically signed. Dec 29 2021  2:48PM    < from: CT Cervical Spine No Cont (12.29.21 @ 14:25) >      A/P:  Pt is a 36y Female with C3, C4, C6, T2 and T7, and L1 compression fx.     PLAN:   Dr. Santana aware of above  awaiting further planning from attending

## 2022-01-01 LAB
ANION GAP SERPL CALC-SCNC: 11 MMOL/L — SIGNIFICANT CHANGE UP (ref 5–17)
BUN SERPL-MCNC: 30.3 MG/DL — HIGH (ref 8–20)
CALCIUM SERPL-MCNC: 8.1 MG/DL — LOW (ref 8.6–10.2)
CHLORIDE SERPL-SCNC: 102 MMOL/L — SIGNIFICANT CHANGE UP (ref 98–107)
CO2 SERPL-SCNC: 25 MMOL/L — SIGNIFICANT CHANGE UP (ref 22–29)
CREAT SERPL-MCNC: 0.67 MG/DL — SIGNIFICANT CHANGE UP (ref 0.5–1.3)
GLUCOSE SERPL-MCNC: 145 MG/DL — HIGH (ref 70–99)
HCT VFR BLD CALC: 36.7 % — SIGNIFICANT CHANGE UP (ref 34.5–45)
HGB BLD-MCNC: 11.6 G/DL — SIGNIFICANT CHANGE UP (ref 11.5–15.5)
MAGNESIUM SERPL-MCNC: 2.3 MG/DL — SIGNIFICANT CHANGE UP (ref 1.6–2.6)
MCHC RBC-ENTMCNC: 27 PG — SIGNIFICANT CHANGE UP (ref 27–34)
MCHC RBC-ENTMCNC: 31.6 GM/DL — LOW (ref 32–36)
MCV RBC AUTO: 85.5 FL — SIGNIFICANT CHANGE UP (ref 80–100)
PLATELET # BLD AUTO: 448 K/UL — HIGH (ref 150–400)
POTASSIUM SERPL-MCNC: 5.3 MMOL/L — SIGNIFICANT CHANGE UP (ref 3.5–5.3)
POTASSIUM SERPL-SCNC: 5.3 MMOL/L — SIGNIFICANT CHANGE UP (ref 3.5–5.3)
RBC # BLD: 4.29 M/UL — SIGNIFICANT CHANGE UP (ref 3.8–5.2)
RBC # FLD: 13.3 % — SIGNIFICANT CHANGE UP (ref 10.3–14.5)
SODIUM SERPL-SCNC: 138 MMOL/L — SIGNIFICANT CHANGE UP (ref 135–145)
WBC # BLD: 6.31 K/UL — SIGNIFICANT CHANGE UP (ref 3.8–10.5)
WBC # FLD AUTO: 6.31 K/UL — SIGNIFICANT CHANGE UP (ref 3.8–10.5)

## 2022-01-01 PROCEDURE — 99232 SBSQ HOSP IP/OBS MODERATE 35: CPT

## 2022-01-01 PROCEDURE — 99231 SBSQ HOSP IP/OBS SF/LOW 25: CPT

## 2022-01-01 RX ORDER — METOCLOPRAMIDE HCL 10 MG
5 TABLET ORAL ONCE
Refills: 0 | Status: COMPLETED | OUTPATIENT
Start: 2022-01-01 | End: 2022-01-01

## 2022-01-01 RX ADMIN — HYDROMORPHONE HYDROCHLORIDE 1 MILLIGRAM(S): 2 INJECTION INTRAMUSCULAR; INTRAVENOUS; SUBCUTANEOUS at 16:29

## 2022-01-01 RX ADMIN — HYDROMORPHONE HYDROCHLORIDE 1 MILLIGRAM(S): 2 INJECTION INTRAMUSCULAR; INTRAVENOUS; SUBCUTANEOUS at 04:43

## 2022-01-01 RX ADMIN — PANTOPRAZOLE SODIUM 40 MILLIGRAM(S): 20 TABLET, DELAYED RELEASE ORAL at 07:41

## 2022-01-01 RX ADMIN — HYDROMORPHONE HYDROCHLORIDE 1 MILLIGRAM(S): 2 INJECTION INTRAMUSCULAR; INTRAVENOUS; SUBCUTANEOUS at 00:32

## 2022-01-01 RX ADMIN — MORPHINE SULFATE 30 MILLIGRAM(S): 50 CAPSULE, EXTENDED RELEASE ORAL at 21:41

## 2022-01-01 RX ADMIN — Medication 5 MILLIGRAM(S): at 17:16

## 2022-01-01 RX ADMIN — Medication 0.5 MILLIGRAM(S): at 18:14

## 2022-01-01 RX ADMIN — MORPHINE SULFATE 30 MILLIGRAM(S): 50 CAPSULE, EXTENDED RELEASE ORAL at 20:41

## 2022-01-01 RX ADMIN — HYDROMORPHONE HYDROCHLORIDE 1 MILLIGRAM(S): 2 INJECTION INTRAMUSCULAR; INTRAVENOUS; SUBCUTANEOUS at 05:13

## 2022-01-01 RX ADMIN — Medication 0.5 MILLIGRAM(S): at 00:32

## 2022-01-01 RX ADMIN — ONDANSETRON 4 MILLIGRAM(S): 8 TABLET, FILM COATED ORAL at 16:19

## 2022-01-01 RX ADMIN — MORPHINE SULFATE 30 MILLIGRAM(S): 50 CAPSULE, EXTENDED RELEASE ORAL at 06:56

## 2022-01-01 RX ADMIN — ONDANSETRON 4 MILLIGRAM(S): 8 TABLET, FILM COATED ORAL at 08:56

## 2022-01-01 RX ADMIN — MORPHINE SULFATE 30 MILLIGRAM(S): 50 CAPSULE, EXTENDED RELEASE ORAL at 16:20

## 2022-01-01 RX ADMIN — HYDROMORPHONE HYDROCHLORIDE 1 MILLIGRAM(S): 2 INJECTION INTRAMUSCULAR; INTRAVENOUS; SUBCUTANEOUS at 20:40

## 2022-01-01 RX ADMIN — MORPHINE SULFATE 30 MILLIGRAM(S): 50 CAPSULE, EXTENDED RELEASE ORAL at 07:26

## 2022-01-01 RX ADMIN — HYDROMORPHONE HYDROCHLORIDE 1 MILLIGRAM(S): 2 INJECTION INTRAMUSCULAR; INTRAVENOUS; SUBCUTANEOUS at 09:15

## 2022-01-01 RX ADMIN — HYDROMORPHONE HYDROCHLORIDE 1 MILLIGRAM(S): 2 INJECTION INTRAMUSCULAR; INTRAVENOUS; SUBCUTANEOUS at 21:00

## 2022-01-01 RX ADMIN — ENOXAPARIN SODIUM 40 MILLIGRAM(S): 100 INJECTION SUBCUTANEOUS at 16:20

## 2022-01-01 RX ADMIN — ESCITALOPRAM OXALATE 5 MILLIGRAM(S): 10 TABLET, FILM COATED ORAL at 16:20

## 2022-01-01 RX ADMIN — MORPHINE SULFATE 30 MILLIGRAM(S): 50 CAPSULE, EXTENDED RELEASE ORAL at 17:15

## 2022-01-01 RX ADMIN — HYDROMORPHONE HYDROCHLORIDE 1 MILLIGRAM(S): 2 INJECTION INTRAMUSCULAR; INTRAVENOUS; SUBCUTANEOUS at 16:45

## 2022-01-01 RX ADMIN — SENNA PLUS 2 TABLET(S): 8.6 TABLET ORAL at 20:41

## 2022-01-01 RX ADMIN — HYDROMORPHONE HYDROCHLORIDE 1 MILLIGRAM(S): 2 INJECTION INTRAMUSCULAR; INTRAVENOUS; SUBCUTANEOUS at 08:56

## 2022-01-01 RX ADMIN — HYDROMORPHONE HYDROCHLORIDE 1 MILLIGRAM(S): 2 INJECTION INTRAMUSCULAR; INTRAVENOUS; SUBCUTANEOUS at 12:35

## 2022-01-01 RX ADMIN — Medication 60 MILLIGRAM(S): at 06:56

## 2022-01-01 RX ADMIN — Medication 40 MILLIGRAM(S): at 16:20

## 2022-01-01 RX ADMIN — HYDROMORPHONE HYDROCHLORIDE 1 MILLIGRAM(S): 2 INJECTION INTRAMUSCULAR; INTRAVENOUS; SUBCUTANEOUS at 13:09

## 2022-01-01 NOTE — PROGRESS NOTE ADULT - SUBJECTIVE AND OBJECTIVE BOX
[facility]  Baltazar Dodson [phone]    Chief Complaint:  Patient is a 36y old  Female who presents with a chief complaint of SOB (01 Jan 2022 10:57)      SUBJECTIVE / OVERNIGHT EVENTS:  Patient seen and examined at bedside. No new complaints.    Patient denies chest pain, SOB, abd pain, N/V, fever, chills, dysuria or any other complaints. All remainder ROS negative.     MEDICATIONS  (STANDING):  enoxaparin Injectable 40 milliGRAM(s) SubCutaneous daily  escitalopram 5 milliGRAM(s) Oral daily  methylPREDNISolone sodium succinate Injectable 40 milliGRAM(s) IV Push every 8 hours  morphine ER Tablet 30 milliGRAM(s) Oral three times a day  pantoprazole    Tablet 40 milliGRAM(s) Oral before breakfast  polyethylene glycol 3350 17 Gram(s) Oral daily  senna 2 Tablet(s) Oral at bedtime    MEDICATIONS  (PRN):  acetaminophen     Tablet .. 650 milliGRAM(s) Oral every 6 hours PRN Temp greater or equal to 38C (100.4F), Mild Pain (1 - 3)  albuterol/ipratropium for Nebulization 3 milliLiter(s) Nebulizer every 6 hours PRN Shortness of Breath and/or Wheezing  Carisoprodol 250 milliGRAM(s) 250 milliGRAM(s) Oral four times a day PRN muscle spasm  guaiFENesin Oral Liquid (Sugar-Free) 200 milliGRAM(s) Oral every 6 hours PRN Cough  HYDROmorphone  Injectable 0.5 milliGRAM(s) IV Push every 4 hours PRN Moderate Pain (4 - 6)  HYDROmorphone  Injectable 1 milliGRAM(s) IV Push every 4 hours PRN Severe Pain (7 - 10)  LORazepam   Injectable 0.5 milliGRAM(s) IV Push three times a day PRN Anxiety  ondansetron Injectable 4 milliGRAM(s) IV Push every 6 hours PRN Nausea and/or Vomiting  promethazine 12.5 milliGRAM(s) Oral two times a day PRN nausea        I&O's Summary    31 Dec 2021 07:01  -  01 Jan 2022 07:00  --------------------------------------------------------  IN: 1200 mL / OUT: 601 mL / NET: 599 mL    01 Jan 2022 07:01  -  01 Jan 2022 11:31  --------------------------------------------------------  IN: 0 mL / OUT: 300 mL / NET: -300 mL        PHYSICAL EXAM:  Vital Signs Last 24 Hrs  T(C): 36.7 (01 Jan 2022 06:49), Max: 36.9 (31 Dec 2021 12:00)  T(F): 98 (01 Jan 2022 06:49), Max: 98.4 (31 Dec 2021 12:00)  HR: 89 (01 Jan 2022 10:41) (59 - 89)  BP: 127/70 (01 Jan 2022 10:41) (127/70 - 155/92)  BP(mean): 113 (01 Jan 2022 08:00) (92 - 113)  RR: 16 (01 Jan 2022 08:00) (16 - 20)  SpO2: 98% (01 Jan 2022 10:41) (95% - 98%)        CONSTITUTIONAL: NAD,   ENMT: Moist oral mucosa, no pharyngeal injection or exudates; normal dentition  RESPIRATORY: Normal respiratory effort; on NC 3LDecreased breath sounds in the left lung base.   CARDIOVASCULAR: Regular rate and rhythm, normal S1 and S2, ; No lower extremity edema;  ABDOMEN: Nontender to palpation, normoactive bowel sounds, no rebound/guarding  MUSCLOSKELETAL: no clubbing or cyanosis of digits; no joint swelling or tenderness to palpation  PSYCH: A+O to person, place, and time; affect appropriate  NEUROLOGY: CN 2-12 are intact and symmetric; no gross sensory deficits;   SKIN: No rashes; no palpable lesions    LABS:                        11.6   6.31  )-----------( 448      ( 01 Jan 2022 07:21 )             36.7     01-01    138  |  102  |  30.3<H>  ----------------------------<  145<H>  5.3   |  25.0  |  0.67    Ca    8.1<L>      01 Jan 2022 07:21  Mg     2.3     01-01    TPro  5.5<L>  /  Alb  2.9<L>  /  TBili  0.7  /  DBili  x   /  AST  36<H>  /  ALT  45<H>  /  AlkPhos  247<H>  12-31              CAPILLARY BLOOD GLUCOSE            RADIOLOGY & ADDITIONAL TESTS:  Results Reviewed:   Imaging Personally Reviewed:  Electrocardiogram Personally Reviewed:

## 2022-01-01 NOTE — PROGRESS NOTE ADULT - ASSESSMENT
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 Santana), Anxiety, and Muscle Spasms who presents to the ED with complaints of worsening SOB and found to have bilateral pleural effusions, left sided opacities and worsening lymphangitic spread of disease. Reports recent admission 12/3 for same at [facility] - had CT pleural effusion drainage > 1lit. She was discharged home on 12/13 on 2-4 lit NC oxygen. She had chest tube placement on 12/23 per thoracic sx service with 1lit drained. ECHO - showed moderate to severe pericardial effusion with cardiac tamponade physiology. this was discussed extensively with cardio, thoracic sx for possible pericardial window - but at this time, she is not deemed a good candidate for this. Repeat Echo showed persistent pericardial effusion. S/ pericardiocentesis on 12/28. Patient also received inpatient  Chemo on 12/27.     Acute on Chronic Hypoxic Respiratory Failure likely due to Malignant exudative Effusions and worsening lymphangitic spread of disease, improving  - by lights criteria effusion is Exudative   - s/p CT, was dislodged on 12/29. Planning for Pleurx on Monday 1/3/22.   -currently on NC 3L, wean as able  -CT angio negative for PE; bilateral pleural effusions (left > right), left sided opacities (atelectasis vs atypical PNA) and worsening lymphangitic spread of disease  - blood cultures negative  - Per ID dc abx as cultures are negative. Pneumonia ruled out.   - Bronchodilators and cough expectorant as needed  - anxiolytics and analgesia as needed  - incentive spirometry  - Pulm following   - Onc consult appreciated.   - Repeat CXR Sunday.     Metastatic, Infiltrating Ductal Carcinoma of the Right Breast  - with liver and bone mets (pathologic, compression fractures of C3 and T2/T7)  - underwent immunotherapy on 12/2 and developed a hypersensitivity reaction  - was scheduled for chemo on 12/27 with Dr. Chapa ([facility]).   - s/p inpatient Chemo Herceptin and Docetaxel on 12/27.   - Hem/onc following, continue on IV steroids. PPI while on steroids    Anxiety  - on Xanax at home  - Ativan prn  - continue escitalopram which was recently started    Back Pain due to Pathologic Compression Fractures  - known C3/T2 compression fractures  - CT angio with new T7 compression fracture with mild retropulsion   - Obtain dedicated imaging of T-spine  - MRIs reviewed from November 2021  - ortho spine consulted (Follows with Dr. Santana as outpatient)  - CT with C3, C4, C6, T2 and T7, and L1 compression fx.   - MRI reviewed  - Continue Morphine ER 30 mg TID  - IV dilaudid for mod/severe pain as needed  - Continue muscle relaxer - on carisoprodol as needed    Moderate Pericardial Effusion  - likely malignant  - TNI negative; BNP within normal range  - patient reports effusion was small on recent TTE at [facility] on 12/12 or 12/13  - patient was seen by [facility] cardio at that time  - TTE - here - moderate pericardial effusion - possible early cardiac tamponade physiology - not a suitable candidate for pericardial window placement at this time per thoracic Sx.    - s/p pericardiocentesis on 12/28, 250cc serous fluid removed.  - will need repeat echo in 1 week    KURT, resolved  - encouraged hydration.     Constipation, resolved  - senna and Miralax    DVT ppx Lovenox  Dispo: remains acute on 3L [place]. Pleurx to be placed Monday.

## 2022-01-01 NOTE — PROGRESS NOTE ADULT - SUBJECTIVE AND OBJECTIVE BOX
Pt Name: NATIVIDAD MYERS    MRN: [id number]    Patient is a 36 year old female being followed for diffuse spine metastasis secondary to right breast CA.   Patient denies any acute motor weakness, numbness or tingling. No new orthopedic complaints    PAST MEDICAL & SURGICAL HISTORY:  Breast CA    H/O compression fracture of spine    Anxiety    S/P tonsillectomy    Allergies: Perjeta (Other (Severe))  pertuzumab (Other (Severe))    PHYSICAL EXAM:    Vital Signs Last 24 Hrs  T(C): 36.7 (01 Jan 2022 06:49), Max: 36.9 (31 Dec 2021 12:00)  T(F): 98 (01 Jan 2022 06:49), Max: 98.4 (31 Dec 2021 12:00)  HR: 89 (01 Jan 2022 10:41) (59 - 89)  BP: 127/70 (01 Jan 2022 10:41) (127/70 - 155/92)  BP(mean): 113 (01 Jan 2022 08:00) (92 - 113)  RR: 16 (01 Jan 2022 08:00) (16 - 20)  SpO2: 98% (01 Jan 2022 10:41) (95% - 98%)    Appearance: Alert, responsive, in no acute distress.    Neuro: Sensation intact to light touch    Skin: no rash on visible skin. Skin is clean, dry and intact. No bleeding. No abrasions. No ulcerations.    Motor exam:          Upper extremities -   5/5 C5-T1 bilaterally without deficit           Lower extremities  -   5/5 L2-S1 bilaterally without deficit    Imaging:    ACC: [id number] EXAM:  MR SPINE LUMBAR                        ACC: [id number] EXAM:  MR SPINE CERVICAL                        ACC: [id number] EXAM:  MR SPINE THORACIC                          PROCEDURE DATE:  12/31/2021      INTERPRETATION:  CLINICAL INDICATION: Metastatic breast cancer, vertebral   body fractures    Magnetic resonance imaging of the cervical, thoracic, and lumbosacral   spine was carried out with sagittal surface coil imaging from C1 to S4   using T1 and fast spin echo T2 weighted images with and without fat   saturation technique with axial T1 and fast spin echo T2 weighted imaging   on a 1.5 Laura magnet.    There is no significant change since the MRI of 11/3/2021.    There is abnormal signal involving the marrow is diffusely of the   cervical thoracic and lumbosacral vertebral bodies. There is a   compression deformity of C3 and C6 with mild bony retropulsion. There is   no cord compression.    There is mild loss of height of the T2 vertebral body and there are more   focal areas of abnormal signal involving T7-T12. There is no bony   retropulsion or cord compression. The thoracic cord is normal in size,   contour, and signal characteristics. The conus terminates normally at the   T12-L1 level.    Evaluation of the lumbar spine demonstrates abnormal signal involving all   the vertebral bodies with the exception of L5. Abnormal signal is   identified within S1. There is no bony retropulsion or canal compression.   The cauda equina is unremarkable.    IMPRESSION: Diffuse abnormal signal involving the visualized vertebral   bodies with compression deformities of C3, C6 and T2. No cord or cauda   equina compression.    UDAY EDMONDSON MD; Attending Radiologist  This document has been electronically signed. Dec 31 2021  6:29PM    A/P:  Pt is a 36y Female with diffuse spine mets secondary to breast CA    PLAN:   Dr. Santana to evaluate MRI and give further recommendations  Heme/ Oncology, Thoracic Surgery notes appreciated  Continue care per primary team

## 2022-01-01 NOTE — PROGRESS NOTE ADULT - SUBJECTIVE AND OBJECTIVE BOX
Subjective: "I feel ok today" NAD sitting up in bed.    T(C): 36.7 (01-01-22 @ 06:49), Max: 36.9 (12-31-21 @ 12:00)  HR: 89 (01-01-22 @ 10:41) (59 - 89)  BP: 127/70 (01-01-22 @ 10:41) (127/70 - 155/92)  RR: 16 (01-01-22 @ 08:00) (16 - 20)  SpO2: 98% (01-01-22 @ 10:41) (95% - 98%) 5L O2 NC    I&O's Detail    31 Dec 2021 07:01  -  01 Jan 2022 07:00  --------------------------------------------------------  IN:    IV PiggyBack: 960 mL    Oral Fluid: 240 mL  Total IN: 1200 mL    OUT:    Voided (mL): 601 mL  Total OUT: 601 mL    Total NET: 599 mL      01 Jan 2022 07:01  -  01 Jan 2022 11:09  --------------------------------------------------------  IN:  Total IN: 0 mL    OUT:    Voided (mL): 300 mL  Total OUT: 300 mL    Total NET: -300 mL          Patient's  laboratory results and current medications reviewed.    Physical Exam:  Gen: NAD  Neuro: AAOx3, nonfocal  Pulm: slightly diminished left base, otherwise clear  CV: RRR  Site: moderately saturated dressing with serous drainage, dressing changed.    Today's CXR:

## 2022-01-01 NOTE — PROGRESS NOTE ADULT - ASSESSMENT
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 Santana), Anxiety, and Muscle Spasms who presents to the ED with complaints of worsening SOB.    CT angio was negative for PE, but revealed bilateral effusions (larger on the left), worsening lymphangitic spread and left sided opacities (Atelectasis vs PNA).    pt s/p chest tube with continued serous fluid , now Chest tube removed  no fevers.  On 4L O2 NC    Metastatic Her 2 + breast ca   - poor tolerance to Perjeta   - rapidly progressive disease with malignant pleural effusion and lymphangitic spread with resp compromise   - started chemo with Taxotere + Herceptin 12/27 and tolerated well.  - close pulm f/u .  High dose steroids.    Taper steroids 40 mg q8 hrs.      Malignant Pleural / pericardial effusion   - s/p Left chest tube -now dislodged  PLan for pleurex catheter on 1/3  - s/p pericardiocentesis and she is feeling better.    Compression fractures - ortho on board. Plan if for CT spine. Pending.    Constipation - management as per primary team.

## 2022-01-01 NOTE — PROGRESS NOTE ADULT - SUBJECTIVE AND OBJECTIVE BOX
Patient is a 36y old  Female who presents with a chief complaint of SOB (24 Dec 2021 12:20)      HPI:  Patient is a 36 year old female with recently diagnosed Metastatic Right Breast CA (Infiltrating Ductal Carcinoma with mets to the Spine and Liver), Malignant Pleural Effusion on Home O2 (3-5 liters), Pathologic Compression Fractures (Follows with Dr. Santana), Anxiety, and Muscle Spasms who presents to the ED with complaints of worsening SOB. Patient was recently admitted in [place] [facility] from 12/2-12/13 for SOB following her first dose of Perjeta.  She required a left sided chest tube.  Patient states she was discharged on home O2 and was doing relatively well until today when she acutely became more dyspneic. Pulse ox was 84% on 5 liters and patient reports feeling an overwhelming feeling of doom and chest pressure. Denies any recent fevers or chills, but does report a productive cough (intermittently yellow). CT angio was negative for PE, but revealed bilateral effusions (larger on the left), worsening lymphangitic spread and left sided opacities (Atelectasis vs PNA).  In the ED, patient was initially placed on bipap and seen by MICU who switched the patient to [place]-[facility] and recommended admission to step down unit. Patient seen and examined, reports SOB has improved since initial presentation but visibly appears anxious. Complaining of pleuritic right sided chest pain when coughing and chronic back pain for which she follows with Dr. Santana. Denies lightheadedness, dizziness, chest pain at this time, vomiting, abdominal pain or diarrhea. Otherwise, reports mild nausea and weakness.  (22 Dec 2021 23:55)     1/1/22  MEDICATIONS  (STANDING):  enoxaparin Injectable 40 milliGRAM(s) SubCutaneous daily  escitalopram 5 milliGRAM(s) Oral daily  methylPREDNISolone sodium succinate Injectable 60 milliGRAM(s) IV Push every 6 hours  morphine ER Tablet 30 milliGRAM(s) Oral three times a day  pantoprazole    Tablet 40 milliGRAM(s) Oral before breakfast  polyethylene glycol 3350 17 Gram(s) Oral daily  senna 2 Tablet(s) Oral at bedtime  L chest tube dislodged  got chemotherapy on 12/27/2021 - early evening.  underwent pericardiocentesis 12/27 and breathing is better.  She is now on 4L 02 NC  episode of nose bleed, resolved        ICU Vital Signs Last 24 Hrs  T(C): 36.7 (01 Jan 2022 06:49), Max: 36.9 (31 Dec 2021 12:00)  T(F): 98 (01 Jan 2022 06:49), Max: 98.4 (31 Dec 2021 12:00)  HR: 80 (01 Jan 2022 08:00) (59 - 80)  BP: 155/92 (01 Jan 2022 08:00) (130/82 - 155/92)  BP(mean): 113 (01 Jan 2022 08:00) (92 - 113)  ABP: --  ABP(mean): --  RR: 16 (01 Jan 2022 08:00) (16 - 20)  SpO2: 97% (01 Jan 2022 08:00) (95% - 98%)        PHYSICAL EXAM  General: adult in NAD  HEENT: clear oropharynx, anicteric sclera, pink conjunctiva  Neck: supple  CV: normal S1/S2 with no murmur rubs or gallops  Lungs: left chest tube with serous fluid   Abdomen: soft non-tender non-distended, no hepatosplenomegaly  Ext: no clubbing cyanosis or edema  Skin: no rashes and no petechiae  Neuro: alert and oriented X 4, no focal deficits      LABS:                          11.6   6.31  )-----------( 448      ( 01 Jan 2022 07:21 )             36.7                           11.9   6.61  )-----------( 448      ( 31 Dec 2021 05:53 )             37.2                             11.9   6.72  )-----------( 433      ( 30 Dec 2021 06:15 )             36.8                             11.6   8.38  )-----------( 465      ( 29 Dec 2021 07:06 )             36.0                           11.8   11.52 )-----------( 518      ( 27 Dec 2021 05:40 )             37.6        10.57  H/H 11/34  plt ct 493  (12/26/2021)   7.57  H/H 11.5/36.8 plt ct 484,000 (12/25/2021)                11.0   11.28 )-----------( 471      ( 24 Dec 2021 06:03 )             35.0     12-31    137  |  103  |  29.3<H>  ----------------------------<  152<H>  5.0   |  23.0  |  0.69    Ca    7.8<L>      31 Dec 2021 05:55  Mg     2.3     12-31    TPro  5.5<L>  /  Alb  2.9<L>  /  TBili  0.7  /  DBili  x   /  AST  36<H>  /  ALT  45<H>  /  AlkPhos  247<H>  12-31 12-30    140  |  106  |  32.3<H>  ----------------------------<  152<H>  5.2   |  24.0  |  0.71    Ca    7.5<L>      30 Dec 2021 06:17  Mg     2.6     12-30    TPro  5.5<L>  /  Alb  2.9<L>  /  TBili  0.6  /  DBili  x   /  AST  53<H>  /  ALT  54<H>  /  AlkPhos  275<H>  12-30 12-29    141  |  106  |  34.1<H>  ----------------------------<  151<H>  4.8   |  24.0  |  0.82    Ca    7.3<L>      29 Dec 2021 07:06  Mg     2.6     12-29    TPro  6.0<L>  /  Alb  3.0<L>  /  TBili  0.4  /  DBili  x   /  AST  35<H>  /  ALT  34<H>  /  AlkPhos  352<H>  12-28 12-27    137  |  101  |  30.7<H>  ----------------------------<  159<H>  4.8   |  24.0  |  1.26    Ca    8.7      27 Dec 2021 05:40  Mg     2.6     12-27    TPro  6.2<L>  /  Alb  3.0<L>  /  TBili  0.4  /  DBili  x   /  AST  27  /  ALT  24  /  AlkPhos  390<H>  12-27 12-24    134<L>  |  98  |  14.6  ----------------------------<  103<H>  3.5   |  22.0  |  0.81    Ca    8.9      24 Dec 2021 06:01  Phos  3.9     12-23  Mg     2.2     12-23        PT/INR - ( 22 Dec 2021 20:59 )   PT: 15.5 sec;   INR: 1.36 ratio         PTT - ( 22 Dec 2021 20:59 )  PTT:36.7 sec

## 2022-01-01 NOTE — PROGRESS NOTE ADULT - ASSESSMENT
Attending statement:  I have personally seen this patient, and formed a face to face diagnostic evaluation on this patient on this date.  I have reviewed the PA, NP and or Medical/PA student and/or Resident documentation and agree with the history, physical exam and plan of care except if noted otherwise.     MRIs of the cervical thoracic and lumbar spine and been reviewed reviewed it demonstrates diffuse osseus metastatic disease. I would not recommend stabilization at this point in time based upon no cord compromise. There's no myelomalacia changes. CAT scan of the cervical spine is impressive for diffuse metastatic disease and pain is controlled I would treat this nonoperatively versus a widespread/large posterior instrumented construct.Patient be followed intermittently throughout her hospital stay weightbearing as tolerated TLSO brace and/or cervical bracing as needed for pain control. Continue with a broad-spectrum treatment with regard to radiation chemotherapy/systemic management.

## 2022-01-02 ENCOUNTER — TRANSCRIPTION ENCOUNTER (OUTPATIENT)
Age: 37
End: 2022-01-02

## 2022-01-02 LAB
ALBUMIN SERPL ELPH-MCNC: 3 G/DL — LOW (ref 3.3–5.2)
ALP SERPL-CCNC: 205 U/L — HIGH (ref 40–120)
ALT FLD-CCNC: 45 U/L — HIGH
ANION GAP SERPL CALC-SCNC: 12 MMOL/L — SIGNIFICANT CHANGE UP (ref 5–17)
AST SERPL-CCNC: 30 U/L — SIGNIFICANT CHANGE UP
BILIRUB SERPL-MCNC: 0.4 MG/DL — SIGNIFICANT CHANGE UP (ref 0.4–2)
BLD GP AB SCN SERPL QL: SIGNIFICANT CHANGE UP
BUN SERPL-MCNC: 27.7 MG/DL — HIGH (ref 8–20)
CALCIUM SERPL-MCNC: 8 MG/DL — LOW (ref 8.6–10.2)
CHLORIDE SERPL-SCNC: 102 MMOL/L — SIGNIFICANT CHANGE UP (ref 98–107)
CO2 SERPL-SCNC: 24 MMOL/L — SIGNIFICANT CHANGE UP (ref 22–29)
CREAT SERPL-MCNC: 0.61 MG/DL — SIGNIFICANT CHANGE UP (ref 0.5–1.3)
CULTURE RESULTS: SIGNIFICANT CHANGE UP
GLUCOSE SERPL-MCNC: 148 MG/DL — HIGH (ref 70–99)
HCT VFR BLD CALC: 34.9 % — SIGNIFICANT CHANGE UP (ref 34.5–45)
HGB BLD-MCNC: 11.2 G/DL — LOW (ref 11.5–15.5)
MCHC RBC-ENTMCNC: 27.4 PG — SIGNIFICANT CHANGE UP (ref 27–34)
MCHC RBC-ENTMCNC: 32.1 GM/DL — SIGNIFICANT CHANGE UP (ref 32–36)
MCV RBC AUTO: 85.3 FL — SIGNIFICANT CHANGE UP (ref 80–100)
PLATELET # BLD AUTO: 430 K/UL — HIGH (ref 150–400)
POTASSIUM SERPL-MCNC: 5 MMOL/L — SIGNIFICANT CHANGE UP (ref 3.5–5.3)
POTASSIUM SERPL-SCNC: 5 MMOL/L — SIGNIFICANT CHANGE UP (ref 3.5–5.3)
PROT SERPL-MCNC: 5.1 G/DL — LOW (ref 6.6–8.7)
RBC # BLD: 4.09 M/UL — SIGNIFICANT CHANGE UP (ref 3.8–5.2)
RBC # FLD: 13.2 % — SIGNIFICANT CHANGE UP (ref 10.3–14.5)
SARS-COV-2 RNA SPEC QL NAA+PROBE: SIGNIFICANT CHANGE UP
SODIUM SERPL-SCNC: 138 MMOL/L — SIGNIFICANT CHANGE UP (ref 135–145)
SPECIMEN SOURCE: SIGNIFICANT CHANGE UP
WBC # BLD: 4.48 K/UL — SIGNIFICANT CHANGE UP (ref 3.8–10.5)
WBC # FLD AUTO: 4.48 K/UL — SIGNIFICANT CHANGE UP (ref 3.8–10.5)

## 2022-01-02 PROCEDURE — 99231 SBSQ HOSP IP/OBS SF/LOW 25: CPT

## 2022-01-02 PROCEDURE — 71045 X-RAY EXAM CHEST 1 VIEW: CPT | Mod: 26

## 2022-01-02 PROCEDURE — 99232 SBSQ HOSP IP/OBS MODERATE 35: CPT

## 2022-01-02 RX ORDER — HYDROMORPHONE HYDROCHLORIDE 2 MG/ML
0.5 INJECTION INTRAMUSCULAR; INTRAVENOUS; SUBCUTANEOUS ONCE
Refills: 0 | Status: DISCONTINUED | OUTPATIENT
Start: 2022-01-02 | End: 2022-01-02

## 2022-01-02 RX ORDER — COLCHICINE 0.6 MG
0.6 TABLET ORAL EVERY 12 HOURS
Refills: 0 | Status: DISCONTINUED | OUTPATIENT
Start: 2022-01-02 | End: 2022-01-03

## 2022-01-02 RX ADMIN — ONDANSETRON 4 MILLIGRAM(S): 8 TABLET, FILM COATED ORAL at 08:25

## 2022-01-02 RX ADMIN — Medication 40 MILLIGRAM(S): at 08:25

## 2022-01-02 RX ADMIN — HYDROMORPHONE HYDROCHLORIDE 1 MILLIGRAM(S): 2 INJECTION INTRAMUSCULAR; INTRAVENOUS; SUBCUTANEOUS at 20:22

## 2022-01-02 RX ADMIN — Medication 0.5 MILLIGRAM(S): at 11:22

## 2022-01-02 RX ADMIN — HYDROMORPHONE HYDROCHLORIDE 1 MILLIGRAM(S): 2 INJECTION INTRAMUSCULAR; INTRAVENOUS; SUBCUTANEOUS at 12:26

## 2022-01-02 RX ADMIN — Medication 40 MILLIGRAM(S): at 15:47

## 2022-01-02 RX ADMIN — ENOXAPARIN SODIUM 40 MILLIGRAM(S): 100 INJECTION SUBCUTANEOUS at 16:14

## 2022-01-02 RX ADMIN — HYDROMORPHONE HYDROCHLORIDE 1 MILLIGRAM(S): 2 INJECTION INTRAMUSCULAR; INTRAVENOUS; SUBCUTANEOUS at 08:25

## 2022-01-02 RX ADMIN — Medication 40 MILLIGRAM(S): at 00:19

## 2022-01-02 RX ADMIN — HYDROMORPHONE HYDROCHLORIDE 0.5 MILLIGRAM(S): 2 INJECTION INTRAMUSCULAR; INTRAVENOUS; SUBCUTANEOUS at 15:30

## 2022-01-02 RX ADMIN — MORPHINE SULFATE 30 MILLIGRAM(S): 50 CAPSULE, EXTENDED RELEASE ORAL at 21:42

## 2022-01-02 RX ADMIN — HYDROMORPHONE HYDROCHLORIDE 1 MILLIGRAM(S): 2 INJECTION INTRAMUSCULAR; INTRAVENOUS; SUBCUTANEOUS at 00:57

## 2022-01-02 RX ADMIN — PANTOPRAZOLE SODIUM 40 MILLIGRAM(S): 20 TABLET, DELAYED RELEASE ORAL at 05:00

## 2022-01-02 RX ADMIN — HYDROMORPHONE HYDROCHLORIDE 1 MILLIGRAM(S): 2 INJECTION INTRAMUSCULAR; INTRAVENOUS; SUBCUTANEOUS at 00:42

## 2022-01-02 RX ADMIN — MORPHINE SULFATE 30 MILLIGRAM(S): 50 CAPSULE, EXTENDED RELEASE ORAL at 15:46

## 2022-01-02 RX ADMIN — HYDROMORPHONE HYDROCHLORIDE 1 MILLIGRAM(S): 2 INJECTION INTRAMUSCULAR; INTRAVENOUS; SUBCUTANEOUS at 04:20

## 2022-01-02 RX ADMIN — MORPHINE SULFATE 30 MILLIGRAM(S): 50 CAPSULE, EXTENDED RELEASE ORAL at 05:00

## 2022-01-02 RX ADMIN — HYDROMORPHONE HYDROCHLORIDE 1 MILLIGRAM(S): 2 INJECTION INTRAMUSCULAR; INTRAVENOUS; SUBCUTANEOUS at 12:56

## 2022-01-02 RX ADMIN — ESCITALOPRAM OXALATE 5 MILLIGRAM(S): 10 TABLET, FILM COATED ORAL at 11:23

## 2022-01-02 RX ADMIN — HYDROMORPHONE HYDROCHLORIDE 1 MILLIGRAM(S): 2 INJECTION INTRAMUSCULAR; INTRAVENOUS; SUBCUTANEOUS at 08:55

## 2022-01-02 RX ADMIN — HYDROMORPHONE HYDROCHLORIDE 1 MILLIGRAM(S): 2 INJECTION INTRAMUSCULAR; INTRAVENOUS; SUBCUTANEOUS at 03:52

## 2022-01-02 RX ADMIN — Medication 0.5 MILLIGRAM(S): at 00:20

## 2022-01-02 RX ADMIN — MORPHINE SULFATE 30 MILLIGRAM(S): 50 CAPSULE, EXTENDED RELEASE ORAL at 16:16

## 2022-01-02 RX ADMIN — HYDROMORPHONE HYDROCHLORIDE 1 MILLIGRAM(S): 2 INJECTION INTRAMUSCULAR; INTRAVENOUS; SUBCUTANEOUS at 21:00

## 2022-01-02 RX ADMIN — Medication 0.5 MILLIGRAM(S): at 05:36

## 2022-01-02 RX ADMIN — HYDROMORPHONE HYDROCHLORIDE 0.5 MILLIGRAM(S): 2 INJECTION INTRAMUSCULAR; INTRAVENOUS; SUBCUTANEOUS at 16:14

## 2022-01-02 RX ADMIN — Medication 0.6 MILLIGRAM(S): at 18:45

## 2022-01-02 RX ADMIN — MORPHINE SULFATE 30 MILLIGRAM(S): 50 CAPSULE, EXTENDED RELEASE ORAL at 06:00

## 2022-01-02 RX ADMIN — HYDROMORPHONE HYDROCHLORIDE 0.5 MILLIGRAM(S): 2 INJECTION INTRAMUSCULAR; INTRAVENOUS; SUBCUTANEOUS at 16:44

## 2022-01-02 RX ADMIN — Medication 0.5 MILLIGRAM(S): at 21:42

## 2022-01-02 RX ADMIN — HYDROMORPHONE HYDROCHLORIDE 0.5 MILLIGRAM(S): 2 INJECTION INTRAMUSCULAR; INTRAVENOUS; SUBCUTANEOUS at 15:00

## 2022-01-02 RX ADMIN — Medication 40 MILLIGRAM(S): at 21:42

## 2022-01-02 RX ADMIN — ONDANSETRON 4 MILLIGRAM(S): 8 TABLET, FILM COATED ORAL at 15:04

## 2022-01-02 NOTE — DISCHARGE NOTE PROVIDER - NSDCFUADDINST_GEN_ALL_CORE_FT
Spine instructions  Follow up Dr. Santana in office in 1 week   WBAT   call office sooner if any concerns or increase in symptoms

## 2022-01-02 NOTE — PROGRESS NOTE ADULT - SUBJECTIVE AND OBJECTIVE BOX
[facility] Division of Hospital Medicine  Baltazar Dodson [phone]    Chief Complaint:  Patient is a 36y old  Female who presents with a chief complaint of SOB (01 Jan 2022 11:29)      SUBJECTIVE / OVERNIGHT EVENTS:  Patient seen and examined at bedside. No acute events overnight. No new complaints.    Patient denies chest pain, SOB, abd pain, N/V, fever, chills, dysuria or any other complaints. All remainder ROS negative.     MEDICATIONS  (STANDING):  enoxaparin Injectable 40 milliGRAM(s) SubCutaneous daily  escitalopram 5 milliGRAM(s) Oral daily  methylPREDNISolone sodium succinate Injectable 40 milliGRAM(s) IV Push every 8 hours  morphine ER Tablet 30 milliGRAM(s) Oral three times a day  pantoprazole    Tablet 40 milliGRAM(s) Oral before breakfast  polyethylene glycol 3350 17 Gram(s) Oral daily  senna 2 Tablet(s) Oral at bedtime    MEDICATIONS  (PRN):  acetaminophen     Tablet .. 650 milliGRAM(s) Oral every 6 hours PRN Temp greater or equal to 38C (100.4F), Mild Pain (1 - 3)  albuterol/ipratropium for Nebulization 3 milliLiter(s) Nebulizer every 6 hours PRN Shortness of Breath and/or Wheezing  Carisoprodol 250 milliGRAM(s) 250 milliGRAM(s) Oral four times a day PRN muscle spasm  guaiFENesin Oral Liquid (Sugar-Free) 200 milliGRAM(s) Oral every 6 hours PRN Cough  HYDROmorphone  Injectable 0.5 milliGRAM(s) IV Push every 4 hours PRN Moderate Pain (4 - 6)  HYDROmorphone  Injectable 1 milliGRAM(s) IV Push every 4 hours PRN Severe Pain (7 - 10)  LORazepam   Injectable 0.5 milliGRAM(s) IV Push three times a day PRN Anxiety  ondansetron Injectable 4 milliGRAM(s) IV Push every 6 hours PRN Nausea and/or Vomiting  promethazine 12.5 milliGRAM(s) Oral two times a day PRN nausea        I&O's Summary    01 Jan 2022 07:01  -  02 Jan 2022 07:00  --------------------------------------------------------  IN: 760 mL / OUT: 1150 mL / NET: -390 mL        PHYSICAL EXAM:  Vital Signs Last 24 Hrs  T(C): 36.9 (02 Jan 2022 05:02), Max: 36.9 (01 Jan 2022 20:30)  T(F): 98.5 (02 Jan 2022 05:02), Max: 98.5 (01 Jan 2022 20:30)  HR: 97 (02 Jan 2022 08:00) (66 - 97)  BP: 148/87 (02 Jan 2022 08:00) (124/84 - 154/89)  BP(mean): 107 (01 Jan 2022 12:00) (107 - 107)  RR: 18 (02 Jan 2022 08:00) (16 - 20)  SpO2: 98% (02 Jan 2022 08:00) (96% - 98%)      CONSTITUTIONAL: NAD,   ENMT: Moist oral mucosa, no pharyngeal injection or exudates; normal dentition  RESPIRATORY: Normal respiratory effort; on NC 3LDecreased breath sounds in the left lung base.   CARDIOVASCULAR: Regular rate and rhythm, normal S1 and S2, ; No lower extremity edema;  ABDOMEN: Nontender to palpation, normoactive bowel sounds, no rebound/guarding  MUSCLOSKELETAL: no clubbing or cyanosis of digits; no joint swelling or tenderness to palpation  PSYCH: A+O to person, place, and time; affect appropriate  NEUROLOGY: CN 2-12 are intact and symmetric; no gross sensory deficits;   SKIN: No rashes; no palpable lesions    LABS:                        11.2   4.48  )-----------( 430      ( 02 Jan 2022 06:02 )             34.9     01-02    138  |  102  |  27.7<H>  ----------------------------<  148<H>  5.0   |  24.0  |  0.61    Ca    8.0<L>      02 Jan 2022 06:02  Mg     2.3     01-01    TPro  5.1<L>  /  Alb  3.0<L>  /  TBili  0.4  /  DBili  x   /  AST  30  /  ALT  45<H>  /  AlkPhos  205<H>  01-02              CAPILLARY BLOOD GLUCOSE            RADIOLOGY & ADDITIONAL TESTS:  Results Reviewed:   Imaging Personally Reviewed:  Electrocardiogram Personally Reviewed:

## 2022-01-02 NOTE — PROGRESS NOTE ADULT - PROBLEM SELECTOR PLAN 1
s/p left pigtail on 12/23/21--> dislodged 12/29  Pleural fluid gram stain NGTD, + Light's criteria, cytology pending  Dressing moderately saturated with serous fluid, changed  Encourage the use of I/S, CDBE, OOB to chair, daily PT, chest PT  Repeat CXR 1/2 shows persistent pleural effusion  currently remains on 5L NC  Plan for pleurex catheter Monday in OR under sedation with Dr. Sow  Pre-OR COVID negative, Type and screen up to date  d/w attending surgeon on call Dr. Heredia s/p left pigtail on 12/23/21--> dislodged 12/29  Pleural fluid gram stain NGTD, + Light's criteria, cytology pending  Dressing moderately saturated with serous fluid, changed  Encourage the use of I/S, CDBE, OOB to chair, daily PT, chest PT  Repeat CXR 1/2 does not show reaccumulation of fluid yet  currently remains on 5L NC  Plan for pleurex catheter Monday in OR under sedation with Dr. Sow  Pre-OR COVID negative, Type and screen up to date  d/w attending surgeon on call Dr. Heredia s/p left pigtail on 12/23/21--> dislodged 12/29  Pleural fluid gram stain NGTD, + Light's criteria, cytology pending  Dressing moderately saturated with serous fluid, being changed PRN  Encourage the use of I/S, CDBE, OOB to chair, daily PT, chest PT  Repeat CXR 1/2 does not show reaccumulation of fluid   currently remains on 5L NC  Was planned for pleurex catheter Monday in OR under sedation with Dr. Sow, however given CXR does not show reaccumulation of pleural fluid, pt agreed to defer Pleurx at this time, understands that she may require Pleurx in the future  d/w attending surgeon on call Dr. Heredia

## 2022-01-02 NOTE — PROGRESS NOTE ADULT - ASSESSMENT
36F, recently diagnosed Metastatic Right Breast CA (Infiltrating Ductal Carcinoma with mets to the Spine and Liver), Malignant Pleural Effusion on Home O2 (3-5 liters), Pathologic Compression Fractures (Follows with Dr. Santana), Anxiety, and Muscle Spasms presented to the ED with complaints of worsening SOB and found to have bilateral pleural effusions, left sided opacities and worsening lymphangitic spread of disease. Patient required BiPAP on arrival for oxygenation. CT spine also showing new acute T2 and T7 compression fractures with bony mets.  Thoracic surgery consulted for pleural effusion. On 12/23 left pigtail inserted for left pleural effusion. 12/28 s/p pericardiocentesis 250cc serous fluid, repeat TTE with resolution of effusion. 12/29 L pigtail dislodged. Patient now pending Pleurx insertion in OR with Dr. Sow on 1/3/21. 36F, recently diagnosed Metastatic Right Breast CA (Infiltrating Ductal Carcinoma with mets to the Spine and Liver), Malignant Pleural Effusion on Home O2 (3-5 liters), Pathologic Compression Fractures (Follows with Dr. Santana), Anxiety, and Muscle Spasms presented to the ED with complaints of worsening SOB and found to have bilateral pleural effusions, left sided opacities and worsening lymphangitic spread of disease. Patient required BiPAP on arrival for oxygenation. CT spine also showing new acute T2 and T7 compression fractures with bony mets.  Thoracic surgery consulted for pleural effusion. On 12/23 left pigtail inserted for left pleural effusion. 12/28 s/p pericardiocentesis 250cc serous fluid, repeat TTE with resolution of effusion. 12/29 L pigtail dislodged.     Patient now pending Pleurx insertion in OR with Dr. Sow on 1/3/21. Repeat CXR 1/2 does not show reaccumulation of pleural fluid. Will discuss with patient her options - be discharge home and await reaccumulation of fluid or have Pleurx insertion on this admission.  36F, recently diagnosed Metastatic Right Breast CA (Infiltrating Ductal Carcinoma with mets to the Spine and Liver), Malignant Pleural Effusion on Home O2 (3-5 liters), Pathologic Compression Fractures (Follows with Dr. Santana), Anxiety, and Muscle Spasms presented to the ED with complaints of worsening SOB and found to have bilateral pleural effusions, left sided opacities and worsening lymphangitic spread of disease. Patient required BiPAP on arrival for oxygenation. CT spine also showing new acute T2 and T7 compression fractures with bony mets.  Thoracic surgery consulted for pleural effusion. On 12/23 left pigtail inserted for left pleural effusion. 12/28 s/p pericardiocentesis 250cc serous fluid, repeat TTE with resolution of effusion. 12/29 L pigtail dislodged.     Patient now pending Pleurx insertion in OR with Dr. Sow on 1/3/21. Repeat CXR 1/2 does not show reaccumulation of pleural fluid as she is likely draining from prior chest tube insertion site. Options were discussed at the bedside with Dr. Heredia 1/1 and once again 1/2 with myself regarding her options - at this point, patient has opted to defer Pleurx insertion on this admission and is aware that she may reaccumulate pleural fluid prompting pleurx insertion at that time.

## 2022-01-02 NOTE — PROGRESS NOTE ADULT - PROBLEM SELECTOR PLAN 2
s/p pericardiocentesis on 12/28/21 with cardiology  Repeat TTE in 1 week as per cardiology - anticipated 1/4

## 2022-01-02 NOTE — DISCHARGE NOTE PROVIDER - PROVIDER TOKENS
PROVIDER:[TOKEN:[[id number]]] PROVIDER:[TOKEN:[[id number]]],PROVIDER:[TOKEN:[[id number]]],PROVIDER:[TOKEN:[[id number]]],PROVIDER:[TOKEN:[[id number]]],PROVIDER:[TOKEN:[[id number]]] PROVIDER:[TOKEN:[[id number]],FOLLOWUP:[1 week]],PROVIDER:[TOKEN:[[id number]],FOLLOWUP:[1 week]],PROVIDER:[TOKEN:[[id number]]],PROVIDER:[TOKEN:[[id number]],FOLLOWUP:[2 weeks]],PROVIDER:[TOKEN:[[id number]]]

## 2022-01-02 NOTE — PROGRESS NOTE ADULT - ASSESSMENT
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 Santana), Anxiety, and Muscle Spasms who presents to the ED with complaints of worsening SOB.    CT angio was negative for PE, but revealed bilateral effusions (larger on the left), worsening lymphangitic spread and left sided opacities (Atelectasis vs PNA).    pt s/p chest tube with continued serous fluid , now Chest tube removed  no fevers.  On 3L O2 NC    Metastatic Her 2 + breast ca   - poor tolerance to Perjeta   - rapidly progressive disease with malignant pleural effusion and lymphangitic spread with resp compromise   - started chemo with Taxotere + Herceptin 12/27 and tolerated well.  - close pulm f/u .  High dose steroids.    Taper steroids 40 mg q8 hrs.      Malignant Pleural / pericardial effusion   - s/p Left chest tube -now dislodged  PLan for pleurex catheter on 1/3  - s/p pericardiocentesis and she is feeling better.    Compression fractures - ortho on board. Plan if for CT spine. Pending.    Constipation - management as per primary team.

## 2022-01-02 NOTE — DISCHARGE NOTE PROVIDER - NSDCFUSCHEDAPPT_GEN_ALL_CORE_FT
NATIVIDAD MYERS ; 01/03/2022 ; NPP Thor Surg [address]  NATIVID[facility] MYERS ; 01/13/2022 ; NPP Gen Surg [address] NAT[facility] MYERS ; 01/13/2022 ; NPP Gen Surg [address]

## 2022-01-02 NOTE — DISCHARGE NOTE PROVIDER - NSDCCPCAREPLAN_GEN_ALL_CORE_FT
PRINCIPAL DISCHARGE DIAGNOSIS  Diagnosis: Acute respiratory failure with hypoxia  Assessment and Plan of Treatment: s/p CT on 12/23,  was dislodged on 12/29.  - continue  on NC 3L  - CT angio negative for PE; bilateral pleural effusions (left > right)  - Per ID no need for  abx as cultures are negative. Pneumonia ruled out.   - Bronchodilators and cough expectorant as needed  - anxiolytics and analgesia as needed  - incentive spirometry  - Per CTS, no need for Pleurx at this time. Patient to follow up outpatient.      SECONDARY DISCHARGE DIAGNOSES  Diagnosis: Pericardial effusion  Assessment and Plan of Treatment: - s/p pericardiocentesis  -continue colchicine x 2 weeks  -follow up with Cardiology    Diagnosis: Breast CA  Assessment and Plan of Treatment: Complete steroid taper as prescribed  Follow up with your Oncologist for further management in one week, sooner if needed    Diagnosis: Pleural effusion, left  Assessment and Plan of Treatment: plan as above    Diagnosis: Pathological compression fracture of spine  Assessment and Plan of Treatment: Follow up with Ortho  Pain control     PRINCIPAL DISCHARGE DIAGNOSIS  Diagnosis: Acute respiratory failure with hypoxia  Assessment and Plan of Treatment: s/p chest tube, no need for Pleurex. On home oxygen. Follow up with CTS.      SECONDARY DISCHARGE DIAGNOSES  Diagnosis: Pericardial effusion  Assessment and Plan of Treatment: - s/p pericardiocentesis  -continue colchicine x 2 weeks  -follow up with Cardiology    Diagnosis: Pleural effusion, left  Assessment and Plan of Treatment: plan as above    Diagnosis: Breast CA  Assessment and Plan of Treatment: - Continue with Prednisone 40mg BID for 1 week.   - Follow up with your Oncologist for further management in one week, sooner if needed    Diagnosis: Pathological compression fracture of spine  Assessment and Plan of Treatment: Follow up with Ortho  Pain control     PRINCIPAL DISCHARGE DIAGNOSIS  Diagnosis: Acute respiratory failure with hypoxia  Assessment and Plan of Treatment: s/p chest tube, no need for Pleurex at this time as CXR showed no reaccumulation. On home oxygen. Follow up with Thoracic Surgery and Pulmonary.      SECONDARY DISCHARGE DIAGNOSES  Diagnosis: Pericardial effusion  Assessment and Plan of Treatment: - s/p pericardiocentesis. Repeat TTE with no pericardial effusion  - Continue colchicine x 2 weeks  - Follow up with Cardiology    Diagnosis: Pleural effusion, left  Assessment and Plan of Treatment: plan as above    Diagnosis: Breast CA  Assessment and Plan of Treatment: - Continue with Prednisone 40mg BID for 1 week.   - Follow up with your Oncologist for further management in one week, sooner if needed    Diagnosis: Pathological compression fracture of spine  Assessment and Plan of Treatment: Follow up with Ortho  Pain control

## 2022-01-02 NOTE — PROGRESS NOTE ADULT - PROBLEM SELECTOR PROBLEM 2
Pericardial effusion
Breast CA
Pericardial effusion

## 2022-01-02 NOTE — DISCHARGE NOTE PROVIDER - HOSPITAL COURSE
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 Moya), Anxiety, and Muscle Spasms who presents to the ED with complaints of worsening SOB and found to have bilateral pleural effusions, left sided opacities and worsening lymphangitic spread of disease. She was empirically started on Abx which were discontinued after cultures were negative and PNA was ruled out. ID was consulted. Pulm, CTS were consulted and she had chest tube placement on 12/23 per CTS with 1L drained. TTE  was done and showed moderate to severe pericardial effusion with cardiac tamponade physiology. Cardiology was consulted and it was discussed extensively with Cardio, CTS for possible pericardial window - but at this time, she is not deemed a good candidate for this. Repeat TTE showed persistent pericardial effusion. She underwent pericardiocentesis on 12/28 with 250cc removed. Patient was started on Colchicine per Cardiology recommendations. Heme/Oncology was consulted and patient received inpatient chemo on 12/27. Patient's chest tube was removed. Pleurx was planned however as patient CXR did not show reaccumulation, Pleurx was deferred by patient and CTS.     Patient also had a MRI spine which showed spinal mets and "Diffuse abnormal signal involving the visualized vertebral bodies with compression deformities of C3, C6 and T2." Orthopedics were consulted and recommended WBAT, no surgical intervention and a TLSO brace.   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 Moya), Anxiety, and Muscle Spasms who presents to the ED with complaints of worsening SOB and found to have bilateral pleural effusions, left sided opacities and worsening lymphangitic spread of disease. She was empirically started on Abx which were discontinued after cultures were negative and PNA was ruled out. ID was consulted. Pulm, CTS were consulted and she had chest tube placement on 12/23 per CTS with 1L drained. TTE  was done and showed moderate to severe pericardial effusion with cardiac tamponade physiology. Cardiology was consulted and it was discussed extensively with Cardio, CTS for possible pericardial window - but at this time, she is not deemed a good candidate for this. Repeat TTE showed persistent pericardial effusion. She underwent pericardiocentesis on 12/28 with 250cc removed. Patient was started on Colchicine per Cardiology recommendations. Heme/Oncology was consulted and patient received inpatient chemo on 12/27. Patient's chest tube was removed. Pleurx was planned however as patient CXR did not show reaccumulation, Pleurx was deferred by patient and CTS. Repeat TTE 1/3: no pericardial effusion.      Patient also had a MRI spine which showed spinal mets and "Diffuse abnormal signal involving the visualized vertebral bodies with compression deformities of C3, C6 and T2." Orthopedics were consulted and recommended WBAT, no surgical intervention.    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 Moya), Anxiety, and Muscle Spasms who presents to the ED with complaints of worsening SOB and found to have bilateral pleural effusions, left sided opacities and worsening lymphangitic spread of disease. She was empirically started on Abx which were discontinued after cultures were negative and PNA was ruled out. ID was consulted. Pulm, CTS were consulted and she had chest tube placement on 12/23 per CTS with 1L drained. TTE  was done and showed moderate to severe pericardial effusion with cardiac tamponade physiology. Cardiology was consulted and it was discussed extensively with Cardio, CTS for possible pericardial window - but at this time, she is not deemed a good candidate for this. Repeat TTE showed persistent pericardial effusion. She underwent pericardiocentesis on 12/28 with 250cc removed. Patient was started on Colchicine per Cardiology recommendations. Heme/Oncology was consulted and patient received inpatient chemo on 12/27. Patient's chest tube was removed. Pleurx was planned however as patient CXR did not show reaccumulation, Pleurx was deferred by patient and CTS. Repeat TTE 1/3: no pericardial effusion.      Patient also had a MRI spine which showed spinal mets and "Diffuse abnormal signal involving the visualized vertebral bodies with compression deformities of C3, C6 and T2." Orthopedics were consulted and recommended WBAT, no surgical intervention.  Seen by PT, no needs.    Discussed with Cardiology, patient to be discharged on Colchicine for 2 weeks and then follow up outpatient with Cardiology.  Discussed with Oncology, patient to be discharged on Prednisone 40mg BID for 1 week and will follow up with Dr. Chapa in 1 week.    Patient requesting to go home. Patient no longer requires inpatient hospitalization and is stable for discharge home with outpatient follow up.    I, the attending physician, was physically present for the key portions of the evaluation and management service provided for this discharge.  The time spent reviewing the hospital course, labs/imaging, assessing/counseling the patient, coordinating with consultants/social work/nursing staff was  40 minutes.   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 Moya), Anxiety, and Muscle Spasms who presents to the ED with complaints of worsening SOB and found to have bilateral pleural effusions, left sided opacities and worsening lymphangitic spread of disease. She was empirically started on Abx which were discontinued after cultures were negative and PNA was ruled out. ID was consulted. Pulm, CTS were consulted and she had chest tube placement on 12/23 per CTS with 1L drained. TTE  was done and showed moderate to severe pericardial effusion with cardiac tamponade physiology. Cardiology was consulted and it was discussed extensively with Cardio, CTS for possible pericardial window - but at this time, she is not deemed a good candidate for this. Repeat TTE showed persistent pericardial effusion. She underwent pericardiocentesis on 12/28 with 250cc removed. Patient was started on Colchicine per Cardiology recommendations. Heme/Oncology was consulted and patient received inpatient chemo on 12/27. Patient's chest tube was removed. Pleurx was planned however as patient CXR did not show reaccumulation, Pleurx was deferred by patient by patient and CTS. Repeat TTE 1/3: no pericardial effusion.      Patient also had a MRI spine which showed spinal mets and "Diffuse abnormal signal involving the visualized vertebral bodies with compression deformities of C3, C6 and T2." Orthopedics were consulted and recommended WBAT, no surgical intervention.  Seen by PT, no needs.    Discussed with Cardiology, patient to be discharged on Colchicine for 2 weeks and then follow up outpatient with Cardiology.  Discussed with Oncology, patient to be discharged on Prednisone 40mg BID for 1 week and will follow up with Dr. Chapa in 1 week.    Patient requesting to go home. Patient no longer requires inpatient hospitalization and is stable for discharge home with outpatient follow up.    I, the attending physician, was physically present for the key portions of the evaluation and management service provided for this discharge.  The time spent reviewing the hospital course, labs/imaging, assessing/counseling the patient, coordinating with consultants/social work/nursing staff was  40 minutes.

## 2022-01-02 NOTE — DISCHARGE NOTE PROVIDER - NSDCMRMEDTOKEN_GEN_ALL_CORE_FT
Albuterol (Eqv-Proventil HFA) 90 mcg/inh inhalation aerosol: 2 puff(s) inhaled every 6 hours, As Needed shortness of breath  albuterol 0.63 mg/3 mL (0.021%) inhalation solution: 3 milliliter(s) inhaled 3 times a day, As Needed  ALPRAZolam 0.5 mg oral tablet: 1 tab(s) orally every 8 hours, As Needed for anxiety  carisoprodol 250 mg oral tablet: 1 tab(s) orally 4 times a day, As Needed for muscle spasm  dexamethasone 4 mg oral tablet: Take 1 tablet by mouth on the everning of chemotherapy,  1 tablet by mouth twice a day on the day before chemotherapy , and twice a day the day after chemotherapy    escitalopram 5 mg oral tablet: 1 tab(s) orally once a day  HYDROmorphone 4 mg oral tablet: 1 tab(s) orally every 6 hours, As Needed for pain  modafinil 100 mg oral tablet: 1 tab(s) orally once a day (in the morning)  morphine 30 mg/8 to 12 hr oral tablet, extended release: 1 tab(s) orally every 8 hours  promethazine 12.5 mg oral tablet: 1 tab(s) orally 2 times a day   Albuterol (Eqv-Proventil HFA) 90 mcg/inh inhalation aerosol: 2 puff(s) inhaled every 6 hours, As Needed shortness of breath  albuterol 0.63 mg/3 mL (0.021%) inhalation solution: 3 milliliter(s) inhaled 3 times a day, As Needed  ALPRAZolam 0.5 mg oral tablet: 1 tab(s) orally every 8 hours, As Needed for anxiety  carisoprodol 250 mg oral tablet: 1 tab(s) orally 4 times a day, As Needed for muscle spasm  colchicine 0.6 mg oral tablet: 1 tab(s) orally every 12 hours  escitalopram 5 mg oral tablet: 1 tab(s) orally once a day  freetext medication     -: 250 milligram(s) orally 4 times a day, As needed, muscle spasm  HYDROmorphone 4 mg oral tablet: 1 tab(s) orally every 6 hours, As Needed for pain  modafinil 100 mg oral tablet: 1 tab(s) orally once a day (in the morning)  morphine 30 mg/8 to 12 hr oral tablet, extended release: 1 tab(s) orally every 8 hours  pantoprazole 40 mg oral delayed release tablet: 1 tab(s) orally once a day (before a meal)  polyethylene glycol 3350 oral powder for reconstitution: 17 gram(s) orally once a day  predniSONE 20 mg oral tablet: 2 tab(s) orally every 12 hours  promethazine 12.5 mg oral tablet: 1 tab(s) orally 2 times a day  senna oral tablet: 2 tab(s) orally once a day (at bedtime)

## 2022-01-02 NOTE — PROGRESS NOTE ADULT - SUBJECTIVE AND OBJECTIVE BOX
Subjective:    Vital Signs:  Vital Signs Last 24 Hrs  T(C): 36.9 (01-02-22 @ 05:02), Max: 36.9 (01-01-22 @ 20:30)  T(F): 98.5 (01-02-22 @ 05:02), Max: 98.5 (01-01-22 @ 20:30)  HR: 97 (01-02-22 @ 08:00) (66 - 97)  BP: 148/87 (01-02-22 @ 08:00) (124/84 - 154/89)  RR: 18 (01-02-22 @ 08:00) (16 - 20)  SpO2: 98% (01-02-22 @ 08:00) (96% - 98%) on (O2)    Telemetry/Alarms:    Relevant labs, radiology and Medications reviewed    Pertinent Physical Exam      01-01 @ 07:01  -  01-02 @ 07:00  --------------------------------------------------------  IN:    Oral Fluid: 760 mL  Total IN: 760 mL    OUT:    Voided (mL): 1150 mL  Total OUT: 1150 mL    Total NET: -390 mL                Subjective: Patient denies acute pain with radiating or aggravating factors.  She denies chest pain, shortness of breath, palpitations, headache, dizziness, nausea, or vomiting.     Vital Signs:  Vital Signs Last 24 Hrs  T(C): 36.9 (01-02-22 @ 05:02), Max: 36.9 (01-01-22 @ 20:30)  T(F): 98.5 (01-02-22 @ 05:02), Max: 98.5 (01-01-22 @ 20:30)  HR: 97 (01-02-22 @ 08:00) (66 - 97)  BP: 148/87 (01-02-22 @ 08:00) (124/84 - 154/89)  RR: 18 (01-02-22 @ 08:00) (16 - 20)  SpO2: 98% (01-02-22 @ 08:00) (96% - 98%) on (O2)    Relevant labs, radiology and Medications reviewed    Pertinent Physical Exam  General: Well appearing, NAD  Neuro: AxO x3, non-focal, DENNEY  Cardiac: S1S2, no murmurs  Pulm: CTA b/l, no wheezing or rales  Abdomen: Soft, NT, ND,  Peripheral: +DP pulses b/l, no peripheral edema     01-01 @ 07:01  -  01-02 @ 07:00  --------------------------------------------------------  IN:    Oral Fluid: 760 mL  Total IN: 760 mL    OUT:    Voided (mL): 1150 mL  Total OUT: 1150 mL    Total NET: -390 mL

## 2022-01-02 NOTE — DISCHARGE NOTE PROVIDER - CARE PROVIDER_API CALL
Eusebio Santana (DO)  Orthopaedic Surgery  [address] [place] [address], [address]  [place] [address]  Phone: [phone]  Fax: [phone]  Follow Up Time:    Eusebio Santana (DO)  Orthopaedic Surgery  [address] [facility] [address], [address]  [place] [address]  Phone: [phone]  Fax: [phone]  Follow Up Time:     [place])  Hematology; Oncology  [address] [place] [address]  Phone: [phone]  Fax: [phone]  Follow Up Time:     Zaira Sow)  Thoracic and Cardiac Surgery  [address] [place] [address]  Phone: [phone]  Fax: [phone]  Follow Up Time:     Huy Bolden)  Cardiology; Internal Medicine  [address] [place] [address]  Phone: [phone]  Fax: [phone]  Follow Up Time:     Que Chen)  Internal Medicine; Pulmonary Disease  [address] [facility], [address]  [place], [place] [id number]  Phone: [phone]  Fax: [phone]  Follow Up Time:    Eusebio Santana (DO)  Orthopaedic Surgery  [address] [facility], [address]  [place] [address]  Phone: [phone]  Fax: [phone]  Follow Up Time: 1 week    [place]a)  Hematology; Oncology  [address] [place] [address]  Phone: [phone]  Fax: [phone]  Follow Up Time: 1 week    Zaira Sow)  Thoracic and Cardiac Surgery  [address] [place] [address]  Phone: [phone]  Fax: [phone]  Follow Up Time:     Huy Bolden)  Cardiology; Internal Medicine  [address] [place] [address]  Phone: [phone]  Fax: [phone]  Follow Up Time: 2 weeks    Que Chen)  Internal Medicine; Pulmonary Disease  [address] [facility], [address]  [place], [place] [id number]  Phone: [phone]  Fax: [phone]  Follow Up Time:

## 2022-01-02 NOTE — PROGRESS NOTE ADULT - ASSESSMENT
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 Santana), Anxiety, and Muscle Spasms who presents to the ED with complaints of worsening SOB and found to have bilateral pleural effusions, left sided opacities and worsening lymphangitic spread of disease. Reports recent admission 12/3 for same at [facility] - had CT pleural effusion drainage > 1lit. She was discharged home on 12/13 on 2-4 lit NC oxygen. She had chest tube placement on 12/23 per thoracic sx service with 1lit drained. ECHO - showed moderate to severe pericardial effusion with cardiac tamponade physiology. this was discussed extensively with cardio, thoracic sx for possible pericardial window - but at this time, she is not deemed a good candidate for this. Repeat Echo showed persistent pericardial effusion. S/ pericardiocentesis on 12/28. Patient also received inpatient  Chemo on 12/27.     Acute on Chronic Hypoxic Respiratory Failure likely due to Malignant exudative Effusions and worsening lymphangitic spread of disease, improving  - by lights criteria effusion is Exudative   - s/p CT, was dislodged on 12/29. Planning for Pleurx on Monday 1/3/22.   - currently on NC 3L, wean as able  -CT angio negative for PE; bilateral pleural effusions (left > right), left sided opacities (atelectasis vs atypical PNA) and worsening lymphangitic spread of disease  - blood cultures negative  - Per ID dc abx as cultures are negative. Pneumonia ruled out.   - Bronchodilators and cough expectorant as needed  - anxiolytics and analgesia as needed  - incentive spirometry  - Pulm following   - Onc consult appreciated.   - Repeat CXR today, follow up read.    Metastatic, Infiltrating Ductal Carcinoma of the Right Breast  - with liver and bone mets (pathologic, compression fractures of C3 and T2/T7)  - underwent immunotherapy on 12/2 and developed a hypersensitivity reaction  - was scheduled for chemo on 12/27 with Dr. Chapa ([place] [facility]).   - s/p inpatient Chemo Herceptin and Docetaxel on 12/27.   - Hem/Onc following, continue on IV steroids. PPI while on steroids    Anxiety  - on Xanax at home  - Ativan prn  - continue escitalopram which was recently started    Back Pain due to Pathologic Compression Fractures  - known C3/T2 compression fractures  - CT angio with new T7 compression fracture with mild retropulsion   - Obtain dedicated imaging of T-spine  - MRIs reviewed from November 2021  - ortho spine consulted (Follows with Dr. Santana as outpatient)  - CT with C3, C4, C6, T2 and T7, and L1 compression fx.   - MRI reviewed  - Continue Morphine ER 30 mg TID  - IV dilaudid for mod/severe pain as needed  - Continue muscle relaxer - on carisoprodol as needed    Moderate Pericardial Effusion  - likely malignant  - TNI negative; BNP within normal range  - patient reports effusion was small on recent TTE at [facility] on 12/12 or 12/13  - patient was seen by [facility] cardio at that time  - TTE - here - moderate pericardial effusion - possible early cardiac tamponade physiology - not a suitable candidate for pericardial window placement at this time per thoracic Sx.    - s/p pericardiocentesis on 12/28, 250cc serous fluid removed.  - will need repeat echo in 1 week    KURT, resolved  - encouraged hydration.     Constipation, resolved  - senna and Miralax    DVT ppx Lovenox  Dispo: remains acute on 3L [place]. Pleurx to be placed Monday.

## 2022-01-02 NOTE — DISCHARGE NOTE PROVIDER - CARE PROVIDERS DIRECT ADDRESSES
[email or web address],[email or web address]

## 2022-01-02 NOTE — PROGRESS NOTE ADULT - SUBJECTIVE AND OBJECTIVE BOX
Patient is a 36y old  Female who presents with a chief complaint of SOB (24 Dec 2021 12:20)      HPI:  Patient is a 36 year old female with recently diagnosed Metastatic Right Breast CA (Infiltrating Ductal Carcinoma with mets to the Spine and Liver), Malignant Pleural Effusion on Home O2 (3-5 liters), Pathologic Compression Fractures (Follows with Dr. Santana), Anxiety, and Muscle Spasms who presents to the ED with complaints of worsening SOB. Patient was recently admitted in [place] from 12/2-12/13 for SOB following her first dose of Perjeta.  She required a left sided chest tube.  Patient states she was discharged on home O2 and was doing relatively well until today when she acutely became more dyspneic. Pulse ox was 84% on 5 liters and patient reports feeling an overwhelming feeling of doom and chest pressure. Denies any recent fevers or chills, but does report a productive cough (intermittently yellow). CT angio was negative for PE, but revealed bilateral effusions (larger on the left), worsening lymphangitic spread and left sided opacities (Atelectasis vs PNA).  In the ED, patient was initially placed on bipap and seen by MICU who switched the patient to Hi-diana and recommended admission to step down unit. Patient seen and examined, reports SOB has improved since initial presentation but visibly appears anxious. Complaining of pleuritic right sided chest pain when coughing and chronic back pain for which she follows with Dr. Santana. Denies lightheadedness, dizziness, chest pain at this time, vomiting, abdominal pain or diarrhea. Otherwise, reports mild nausea and weakness.  (22 Dec 2021 23:55)     1/2/22  seen at bedside, afebrile, on 3l NC      MEDICATIONS  (STANDING):  enoxaparin Injectable 40 milliGRAM(s) SubCutaneous daily  escitalopram 5 milliGRAM(s) Oral daily  methylPREDNISolone sodium succinate Injectable 40 milliGRAM(s) IV Push every 8 hours  morphine ER Tablet 30 milliGRAM(s) Oral three times a day  pantoprazole    Tablet 40 milliGRAM(s) Oral before breakfast  polyethylene glycol 3350 17 Gram(s) Oral daily  senna 2 Tablet(s) Oral at bedtime    ICU Vital Signs Last 24 Hrs  T(C): 36.9 (02 Jan 2022 05:02), Max: 36.9 (01 Jan 2022 20:30)  T(F): 98.5 (02 Jan 2022 05:02), Max: 98.5 (01 Jan 2022 20:30)  HR: 97 (02 Jan 2022 08:00) (66 - 97)  BP: 148/87 (02 Jan 2022 08:00) (124/84 - 154/89)  BP(mean): 107 (01 Jan 2022 12:00) (107 - 107)  ABP: --  ABP(mean): --  RR: 18 (02 Jan 2022 08:00) (16 - 20)  SpO2: 98% (02 Jan 2022 08:00) (96% - 98%)          PHYSICAL EXAM  General: adult in NAD  HEENT: clear oropharynx, anicteric sclera, pink conjunctiva  Neck: supple  CV: normal S1/S2 with no murmur rubs or gallops  Lungs: left chest tube with serous fluid   Abdomen: soft non-tender non-distended, no hepatosplenomegaly  Ext: no clubbing cyanosis or edema  Skin: no rashes and no petechiae  Neuro: alert and oriented X 4, no focal deficits      LABS:                          11.2   4.48  )-----------( 430      ( 02 Jan 2022 06:02 )             34.9                           11.6   6.31  )-----------( 448      ( 01 Jan 2022 07:21 )             36.7                           11.9   6.61  )-----------( 448      ( 31 Dec 2021 05:53 )             37.2                             11.9   6.72  )-----------( 433      ( 30 Dec 2021 06:15 )             36.8                             11.6   8.38  )-----------( 465      ( 29 Dec 2021 07:06 )             36.0                           11.8   11.52 )-----------( 518      ( 27 Dec 2021 05:40 )             37.6        10.57  H/H 11/34  plt ct 493  (12/26/2021)   7.57  H/H 11.5/36.8 plt ct 484,000 (12/25/2021)                11.0   11.28 )-----------( 471      ( 24 Dec 2021 06:03 )             35.0     12-31    137  |  103  |  29.3<H>  ----------------------------<  152<H>  5.0   |  23.0  |  0.69    Ca    7.8<L>      31 Dec 2021 05:55  Mg     2.3     12-31    TPro  5.5<L>  /  Alb  2.9<L>  /  TBili  0.7  /  DBili  x   /  AST  36<H>  /  ALT  45<H>  /  AlkPhos  247<H>  12-31 12-30    140  |  106  |  32.3<H>  ----------------------------<  152<H>  5.2   |  24.0  |  0.71    Ca    7.5<L>      30 Dec 2021 06:17  Mg     2.6     12-30    TPro  5.5<L>  /  Alb  2.9<L>  /  TBili  0.6  /  DBili  x   /  AST  53<H>  /  ALT  54<H>  /  AlkPhos  275<H>  12-30 12-29    141  |  106  |  34.1<H>  ----------------------------<  151<H>  4.8   |  24.0  |  0.82    Ca    7.3<L>      29 Dec 2021 07:06  Mg     2.6     12-29    TPro  6.0<L>  /  Alb  3.0<L>  /  TBili  0.4  /  DBili  x   /  AST  35<H>  /  ALT  34<H>  /  AlkPhos  352<H>  12-28 12-27    137  |  101  |  30.7<H>  ----------------------------<  159<H>  4.8   |  24.0  |  1.26    Ca    8.7      27 Dec 2021 05:40  Mg     2.6     12-27    TPro  6.2<L>  /  Alb  3.0<L>  /  TBili  0.4  /  DBili  x   /  AST  27  /  ALT  24  /  AlkPhos  390<H>  12-27 12-24    134<L>  |  98  |  14.6  ----------------------------<  103<H>  3.5   |  22.0  |  0.81    Ca    8.9      24 Dec 2021 06:01  Phos  3.9     12-23  Mg     2.2     12-23        PT/INR - ( 22 Dec 2021 20:59 )   PT: 15.5 sec;   INR: 1.36 ratio         PTT - ( 22 Dec 2021 20:59 )  PTT:36.7 sec

## 2022-01-03 ENCOUNTER — TRANSCRIPTION ENCOUNTER (OUTPATIENT)
Age: 37
End: 2022-01-03

## 2022-01-03 ENCOUNTER — APPOINTMENT (OUTPATIENT)
Dept: THORACIC SURGERY | Facility: HOSPITAL | Age: 37
End: 2022-01-03

## 2022-01-03 VITALS
DIASTOLIC BLOOD PRESSURE: 88 MMHG | RESPIRATION RATE: 19 BRPM | TEMPERATURE: 98 F | HEART RATE: 89 BPM | OXYGEN SATURATION: 97 % | SYSTOLIC BLOOD PRESSURE: 134 MMHG

## 2022-01-03 LAB
ALBUMIN SERPL ELPH-MCNC: 2.9 G/DL — LOW (ref 3.3–5.2)
ALP SERPL-CCNC: 189 U/L — HIGH (ref 40–120)
ALT FLD-CCNC: 46 U/L — HIGH
ANION GAP SERPL CALC-SCNC: 11 MMOL/L — SIGNIFICANT CHANGE UP (ref 5–17)
AST SERPL-CCNC: 27 U/L — SIGNIFICANT CHANGE UP
BILIRUB SERPL-MCNC: 0.6 MG/DL — SIGNIFICANT CHANGE UP (ref 0.4–2)
BUN SERPL-MCNC: 24.1 MG/DL — HIGH (ref 8–20)
CALCIUM SERPL-MCNC: 7.7 MG/DL — LOW (ref 8.6–10.2)
CHLORIDE SERPL-SCNC: 100 MMOL/L — SIGNIFICANT CHANGE UP (ref 98–107)
CO2 SERPL-SCNC: 25 MMOL/L — SIGNIFICANT CHANGE UP (ref 22–29)
CREAT SERPL-MCNC: 0.63 MG/DL — SIGNIFICANT CHANGE UP (ref 0.5–1.3)
GLUCOSE SERPL-MCNC: 140 MG/DL — HIGH (ref 70–99)
HCT VFR BLD CALC: 34.1 % — LOW (ref 34.5–45)
HGB BLD-MCNC: 11.1 G/DL — LOW (ref 11.5–15.5)
MCHC RBC-ENTMCNC: 27.4 PG — SIGNIFICANT CHANGE UP (ref 27–34)
MCHC RBC-ENTMCNC: 32.6 GM/DL — SIGNIFICANT CHANGE UP (ref 32–36)
MCV RBC AUTO: 84.2 FL — SIGNIFICANT CHANGE UP (ref 80–100)
NON-GYNECOLOGICAL CYTOLOGY STUDY: SIGNIFICANT CHANGE UP
PLATELET # BLD AUTO: 438 K/UL — HIGH (ref 150–400)
POTASSIUM SERPL-MCNC: 4.7 MMOL/L — SIGNIFICANT CHANGE UP (ref 3.5–5.3)
POTASSIUM SERPL-SCNC: 4.7 MMOL/L — SIGNIFICANT CHANGE UP (ref 3.5–5.3)
PROT SERPL-MCNC: 5.1 G/DL — LOW (ref 6.6–8.7)
RBC # BLD: 4.05 M/UL — SIGNIFICANT CHANGE UP (ref 3.8–5.2)
RBC # FLD: 13 % — SIGNIFICANT CHANGE UP (ref 10.3–14.5)
SODIUM SERPL-SCNC: 135 MMOL/L — SIGNIFICANT CHANGE UP (ref 135–145)
WBC # BLD: 2.32 K/UL — LOW (ref 3.8–10.5)
WBC # FLD AUTO: 2.32 K/UL — LOW (ref 3.8–10.5)

## 2022-01-03 PROCEDURE — 80053 COMPREHEN METABOLIC PANEL: CPT

## 2022-01-03 PROCEDURE — U0005: CPT

## 2022-01-03 PROCEDURE — 87252 VIRUS INOCULATION TISSUE: CPT

## 2022-01-03 PROCEDURE — 72148 MRI LUMBAR SPINE W/O DYE: CPT

## 2022-01-03 PROCEDURE — C8929: CPT

## 2022-01-03 PROCEDURE — 93010 ELECTROCARDIOGRAM REPORT: CPT

## 2022-01-03 PROCEDURE — 72125 CT NECK SPINE W/O DYE: CPT

## 2022-01-03 PROCEDURE — 87015 SPECIMEN INFECT AGNT CONCNTJ: CPT

## 2022-01-03 PROCEDURE — 94640 AIRWAY INHALATION TREATMENT: CPT

## 2022-01-03 PROCEDURE — 87070 CULTURE OTHR SPECIMN AEROBIC: CPT

## 2022-01-03 PROCEDURE — 85610 PROTHROMBIN TIME: CPT

## 2022-01-03 PROCEDURE — 80048 BASIC METABOLIC PNL TOTAL CA: CPT

## 2022-01-03 PROCEDURE — C1894: CPT

## 2022-01-03 PROCEDURE — 93308 TTE F-UP OR LMTD: CPT | Mod: 26

## 2022-01-03 PROCEDURE — 99152 MOD SED SAME PHYS/QHP 5/>YRS: CPT

## 2022-01-03 PROCEDURE — 99239 HOSP IP/OBS DSCHRG MGMT >30: CPT

## 2022-01-03 PROCEDURE — 87040 BLOOD CULTURE FOR BACTERIA: CPT

## 2022-01-03 PROCEDURE — 88305 TISSUE EXAM BY PATHOLOGIST: CPT

## 2022-01-03 PROCEDURE — 72131 CT LUMBAR SPINE W/O DYE: CPT

## 2022-01-03 PROCEDURE — 87102 FUNGUS ISOLATION CULTURE: CPT

## 2022-01-03 PROCEDURE — 85027 COMPLETE CBC AUTOMATED: CPT

## 2022-01-03 PROCEDURE — 87637 SARSCOV2&INF A&B&RSV AMP PRB: CPT

## 2022-01-03 PROCEDURE — 82945 GLUCOSE OTHER FLUID: CPT

## 2022-01-03 PROCEDURE — 87206 SMEAR FLUORESCENT/ACID STAI: CPT

## 2022-01-03 PROCEDURE — 84484 ASSAY OF TROPONIN QUANT: CPT

## 2022-01-03 PROCEDURE — 72141 MRI NECK SPINE W/O DYE: CPT

## 2022-01-03 PROCEDURE — 84100 ASSAY OF PHOSPHORUS: CPT

## 2022-01-03 PROCEDURE — 87205 SMEAR GRAM STAIN: CPT

## 2022-01-03 PROCEDURE — 88341 IMHCHEM/IMCYTCHM EA ADD ANTB: CPT

## 2022-01-03 PROCEDURE — 83986 ASSAY PH BODY FLUID NOS: CPT

## 2022-01-03 PROCEDURE — 99285 EMERGENCY DEPT VISIT HI MDM: CPT

## 2022-01-03 PROCEDURE — 82803 BLOOD GASES ANY COMBINATION: CPT

## 2022-01-03 PROCEDURE — 87075 CULTR BACTERIA EXCEPT BLOOD: CPT

## 2022-01-03 PROCEDURE — 84155 ASSAY OF PROTEIN SERUM: CPT

## 2022-01-03 PROCEDURE — 86901 BLOOD TYPING SEROLOGIC RH(D): CPT

## 2022-01-03 PROCEDURE — 97163 PT EVAL HIGH COMPLEX 45 MIN: CPT

## 2022-01-03 PROCEDURE — 36415 COLL VENOUS BLD VENIPUNCTURE: CPT

## 2022-01-03 PROCEDURE — 71045 X-RAY EXAM CHEST 1 VIEW: CPT

## 2022-01-03 PROCEDURE — 86850 RBC ANTIBODY SCREEN: CPT

## 2022-01-03 PROCEDURE — 84702 CHORIONIC GONADOTROPIN TEST: CPT

## 2022-01-03 PROCEDURE — 96375 TX/PRO/DX INJ NEW DRUG ADDON: CPT

## 2022-01-03 PROCEDURE — 99153 MOD SED SAME PHYS/QHP EA: CPT

## 2022-01-03 PROCEDURE — 94760 N-INVAS EAR/PLS OXIMETRY 1: CPT

## 2022-01-03 PROCEDURE — 87116 MYCOBACTERIA CULTURE: CPT

## 2022-01-03 PROCEDURE — 93308 TTE F-UP OR LMTD: CPT

## 2022-01-03 PROCEDURE — 85025 COMPLETE CBC W/AUTO DIFF WBC: CPT

## 2022-01-03 PROCEDURE — C1729: CPT

## 2022-01-03 PROCEDURE — 83615 LACTATE (LD) (LDH) ENZYME: CPT

## 2022-01-03 PROCEDURE — 71275 CT ANGIOGRAPHY CHEST: CPT | Mod: MG

## 2022-01-03 PROCEDURE — 83880 ASSAY OF NATRIURETIC PEPTIDE: CPT

## 2022-01-03 PROCEDURE — 96374 THER/PROPH/DIAG INJ IV PUSH: CPT

## 2022-01-03 PROCEDURE — 86900 BLOOD TYPING SEROLOGIC ABO: CPT

## 2022-01-03 PROCEDURE — 88342 IMHCHEM/IMCYTCHM 1ST ANTB: CPT

## 2022-01-03 PROCEDURE — 80202 ASSAY OF VANCOMYCIN: CPT

## 2022-01-03 PROCEDURE — C1889: CPT

## 2022-01-03 PROCEDURE — 85730 THROMBOPLASTIN TIME PARTIAL: CPT

## 2022-01-03 PROCEDURE — 83735 ASSAY OF MAGNESIUM: CPT

## 2022-01-03 PROCEDURE — 89051 BODY FLUID CELL COUNT: CPT

## 2022-01-03 PROCEDURE — G1004: CPT

## 2022-01-03 PROCEDURE — 82042 OTHER SOURCE ALBUMIN QUAN EA: CPT

## 2022-01-03 PROCEDURE — 93005 ELECTROCARDIOGRAM TRACING: CPT

## 2022-01-03 PROCEDURE — U0003: CPT

## 2022-01-03 PROCEDURE — 84145 PROCALCITONIN (PCT): CPT

## 2022-01-03 PROCEDURE — 93451 RIGHT HEART CATH: CPT

## 2022-01-03 PROCEDURE — 84157 ASSAY OF PROTEIN OTHER: CPT

## 2022-01-03 PROCEDURE — 72146 MRI CHEST SPINE W/O DYE: CPT

## 2022-01-03 PROCEDURE — 88112 CYTOPATH CELL ENHANCE TECH: CPT

## 2022-01-03 RX ORDER — COLCHICINE 0.6 MG
1 TABLET ORAL
Qty: 28 | Refills: 0
Start: 2022-01-03 | End: 2022-01-16

## 2022-01-03 RX ORDER — PANTOPRAZOLE SODIUM 20 MG/1
1 TABLET, DELAYED RELEASE ORAL
Qty: 40 | Refills: 0
Start: 2022-01-03 | End: 2022-02-11

## 2022-01-03 RX ORDER — SENNA PLUS 8.6 MG/1
2 TABLET ORAL
Qty: 0 | Refills: 0 | DISCHARGE
Start: 2022-01-03

## 2022-01-03 RX ORDER — DEXAMETHASONE 0.5 MG/5ML
0 ELIXIR ORAL
Qty: 0 | Refills: 0 | DISCHARGE

## 2022-01-03 RX ORDER — POLYETHYLENE GLYCOL 3350 17 G/17G
17 POWDER, FOR SOLUTION ORAL
Qty: 0 | Refills: 0 | DISCHARGE
Start: 2022-01-03

## 2022-01-03 RX ADMIN — HYDROMORPHONE HYDROCHLORIDE 1 MILLIGRAM(S): 2 INJECTION INTRAMUSCULAR; INTRAVENOUS; SUBCUTANEOUS at 01:00

## 2022-01-03 RX ADMIN — Medication 300 UNIT(S): at 17:06

## 2022-01-03 RX ADMIN — ESCITALOPRAM OXALATE 5 MILLIGRAM(S): 10 TABLET, FILM COATED ORAL at 13:00

## 2022-01-03 RX ADMIN — HYDROMORPHONE HYDROCHLORIDE 1 MILLIGRAM(S): 2 INJECTION INTRAMUSCULAR; INTRAVENOUS; SUBCUTANEOUS at 17:26

## 2022-01-03 RX ADMIN — HYDROMORPHONE HYDROCHLORIDE 1 MILLIGRAM(S): 2 INJECTION INTRAMUSCULAR; INTRAVENOUS; SUBCUTANEOUS at 00:29

## 2022-01-03 RX ADMIN — ONDANSETRON 4 MILLIGRAM(S): 8 TABLET, FILM COATED ORAL at 04:46

## 2022-01-03 RX ADMIN — MORPHINE SULFATE 30 MILLIGRAM(S): 50 CAPSULE, EXTENDED RELEASE ORAL at 00:20

## 2022-01-03 RX ADMIN — HYDROMORPHONE HYDROCHLORIDE 1 MILLIGRAM(S): 2 INJECTION INTRAMUSCULAR; INTRAVENOUS; SUBCUTANEOUS at 05:00

## 2022-01-03 RX ADMIN — MORPHINE SULFATE 30 MILLIGRAM(S): 50 CAPSULE, EXTENDED RELEASE ORAL at 06:00

## 2022-01-03 RX ADMIN — HYDROMORPHONE HYDROCHLORIDE 1 MILLIGRAM(S): 2 INJECTION INTRAMUSCULAR; INTRAVENOUS; SUBCUTANEOUS at 13:00

## 2022-01-03 RX ADMIN — Medication 0.5 MILLIGRAM(S): at 14:21

## 2022-01-03 RX ADMIN — Medication 0.6 MILLIGRAM(S): at 05:43

## 2022-01-03 RX ADMIN — HYDROMORPHONE HYDROCHLORIDE 1 MILLIGRAM(S): 2 INJECTION INTRAMUSCULAR; INTRAVENOUS; SUBCUTANEOUS at 04:46

## 2022-01-03 RX ADMIN — HYDROMORPHONE HYDROCHLORIDE 1 MILLIGRAM(S): 2 INJECTION INTRAMUSCULAR; INTRAVENOUS; SUBCUTANEOUS at 12:58

## 2022-01-03 RX ADMIN — Medication 40 MILLIGRAM(S): at 05:43

## 2022-01-03 RX ADMIN — HYDROMORPHONE HYDROCHLORIDE 1 MILLIGRAM(S): 2 INJECTION INTRAMUSCULAR; INTRAVENOUS; SUBCUTANEOUS at 08:45

## 2022-01-03 RX ADMIN — ENOXAPARIN SODIUM 40 MILLIGRAM(S): 100 INJECTION SUBCUTANEOUS at 13:00

## 2022-01-03 RX ADMIN — ONDANSETRON 4 MILLIGRAM(S): 8 TABLET, FILM COATED ORAL at 14:21

## 2022-01-03 RX ADMIN — Medication 0.5 MILLIGRAM(S): at 05:43

## 2022-01-03 RX ADMIN — MORPHINE SULFATE 30 MILLIGRAM(S): 50 CAPSULE, EXTENDED RELEASE ORAL at 14:20

## 2022-01-03 RX ADMIN — PANTOPRAZOLE SODIUM 40 MILLIGRAM(S): 20 TABLET, DELAYED RELEASE ORAL at 05:43

## 2022-01-03 RX ADMIN — MORPHINE SULFATE 30 MILLIGRAM(S): 50 CAPSULE, EXTENDED RELEASE ORAL at 05:43

## 2022-01-03 RX ADMIN — Medication 0.6 MILLIGRAM(S): at 17:22

## 2022-01-03 RX ADMIN — MORPHINE SULFATE 30 MILLIGRAM(S): 50 CAPSULE, EXTENDED RELEASE ORAL at 17:12

## 2022-01-03 RX ADMIN — Medication 40 MILLIGRAM(S): at 17:21

## 2022-01-03 NOTE — PROGRESS NOTE ADULT - SUBJECTIVE AND OBJECTIVE BOX
[facility] Division of Hospital Medicine  Baltazar Dodson [phone]    Chief Complaint:  Patient is a 36y old  Female who presents with a chief complaint of SOB (03 Jan 2022 10:18)      SUBJECTIVE / OVERNIGHT EVENTS:  Patient seen and examined at bedside. No acute events overnight. No new complaints.    Patient denies chest pain, SOB, abd pain, fever, chills, dysuria or any other complaints. All remainder ROS negative.     MEDICATIONS  (STANDING):  colchicine 0.6 milliGRAM(s) Oral every 12 hours  enoxaparin Injectable 40 milliGRAM(s) SubCutaneous daily  escitalopram 5 milliGRAM(s) Oral daily  morphine ER Tablet 30 milliGRAM(s) Oral three times a day  pantoprazole    Tablet 40 milliGRAM(s) Oral before breakfast  polyethylene glycol 3350 17 Gram(s) Oral daily  predniSONE   Tablet 40 milliGRAM(s) Oral every 12 hours  senna 2 Tablet(s) Oral at bedtime    MEDICATIONS  (PRN):  acetaminophen     Tablet .. 650 milliGRAM(s) Oral every 6 hours PRN Temp greater or equal to 38C (100.4F), Mild Pain (1 - 3)  albuterol/ipratropium for Nebulization 3 milliLiter(s) Nebulizer every 6 hours PRN Shortness of Breath and/or Wheezing  Carisoprodol 250 milliGRAM(s) 250 milliGRAM(s) Oral four times a day PRN muscle spasm  guaiFENesin Oral Liquid (Sugar-Free) 200 milliGRAM(s) Oral every 6 hours PRN Cough  HYDROmorphone  Injectable 0.5 milliGRAM(s) IV Push every 4 hours PRN Moderate Pain (4 - 6)  HYDROmorphone  Injectable 1 milliGRAM(s) IV Push every 4 hours PRN Severe Pain (7 - 10)  LORazepam   Injectable 0.5 milliGRAM(s) IV Push three times a day PRN Anxiety  ondansetron Injectable 4 milliGRAM(s) IV Push every 6 hours PRN Nausea and/or Vomiting  promethazine 12.5 milliGRAM(s) Oral two times a day PRN nausea        I&O's Summary    03 Jan 2022 07:01  -  03 Jan 2022 11:24  --------------------------------------------------------  IN: 180 mL / OUT: 0 mL / NET: 180 mL        PHYSICAL EXAM:  Vital Signs Last 24 Hrs  T(C): 36.8 (03 Jan 2022 08:00), Max: 37.1 (02 Jan 2022 20:00)  T(F): 98.3 (03 Jan 2022 08:00), Max: 98.7 (02 Jan 2022 20:00)  HR: 73 (03 Jan 2022 08:00) (72 - 92)  BP: 145/88 (03 Jan 2022 08:00) (121/70 - 145/88)  BP(mean): 107 (03 Jan 2022 08:00) (94 - 107)  RR: 20 (03 Jan 2022 04:33) (16 - 20)  SpO2: 94% (03 Jan 2022 08:00) (94% - 99%)        CONSTITUTIONAL: NAD  HEENT: NC/AT, PERRL, no JVD  RESPIRATORY: CTA bilaterally, normal effort  CARDIOVASCULAR: RRR, S1/S2+, no m/g/r  ABDOMEN: Nontender to palpation, normoactive bowel sounds, no rebound/guarding; No hepatosplenomegaly  MUSCULOSKELETAL: No edema, cyanosis or deformities.  PSYCH: A+O to person, place, and time; affect appropriate  NEUROLOGY: CN 2-12 are intact and symmetric; no gross neurological deficits.  SKIN: No rashes; no palpable lesions  VASC: Distal pulses palpable    LABS:                        11.1   2.32  )-----------( 438      ( 03 Jan 2022 05:16 )             34.1     01-03    135  |  100  |  24.1<H>  ----------------------------<  140<H>  4.7   |  25.0  |  0.63    Ca    7.7<L>      03 Jan 2022 05:16    TPro  5.1<L>  /  Alb  2.9<L>  /  TBili  0.6  /  DBili  x   /  AST  27  /  ALT  46<H>  /  AlkPhos  189<H>  01-03              CAPILLARY BLOOD GLUCOSE            RADIOLOGY & ADDITIONAL TESTS:  Results Reviewed:   Imaging Personally Reviewed:  Electrocardiogram Personally Reviewed:

## 2022-01-03 NOTE — PROGRESS NOTE ADULT - SUBJECTIVE AND OBJECTIVE BOX
Whitney was awake and alert sitting on the side of the bed as I fit her with an Aspen Multipost cervical brace. She was instructed on donning and liner exchange. The straps were indexed to help to keep position under her chin. Contact info and additional liners were provided.   [facility]  [phone]

## 2022-01-03 NOTE — PROGRESS NOTE ADULT - PROVIDER SPECIALTY LIST ADULT
Cardiology
Cardiology
Heme/Onc
Hospitalist
Orthopedics
Orthopedics
Cardiology
Cardiology
Hospitalist
Infectious Disease
Infectious Disease
Orthopedics
Cardiology
Heme/Onc
Hospitalist
Orthopedics
Orthopedics
Heme/Onc
Hospitalist
Pulmonology
Thoracic Surgery
Thoracic Surgery
Pulmonology
Thoracic Surgery

## 2022-01-03 NOTE — PROGRESS NOTE ADULT - ASSESSMENT
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 Santana), Anxiety, and Muscle Spasms who presents to the ED with complaints of worsening SOB and found to have bilateral pleural effusions, left sided opacities and worsening lymphangitic spread of disease. Reports recent admission 12/3 for same at [facility] - had CT pleural effusion drainage > 1lit. She was discharged home on 12/13 on 2-4 lit NC oxygen. She had chest tube placement on 12/23 per thoracic sx service with 1lit drained. ECHO - showed moderate to severe pericardial effusion with cardiac tamponade physiology. this was discussed extensively with cardio, thoracic sx for possible pericardial window - but at this time, she is not deemed a good candidate for this. Repeat Echo showed persistent pericardial effusion. S/ pericardiocentesis on 12/28. Patient also received inpatient  Chemo on 12/27.     Acute on Chronic Hypoxic Respiratory Failure likely due to Malignant exudative Effusions and worsening lymphangitic spread of disease, improving  - by lights criteria effusion is Exudative   - s/p CT, was dislodged on 12/29.  - currently on NC 3L, wean as able  - CT angio negative for PE; bilateral pleural effusions (left > right), left sided opacities (atelectasis vs atypical PNA) and worsening lymphangitic spread of disease  - Per ID dc abx as cultures are negative. Pneumonia ruled out.   - Bronchodilators and cough expectorant as needed  - anxiolytics and analgesia as needed  - incentive spirometry  - Pulm following   - Heme/Onc following  - Per CTS, no need for Pleurx at this time. Patient to follow up outpatient.     Metastatic, Infiltrating Ductal Carcinoma of the Right Breast  - with liver and bone mets (pathologic, compression fractures of C3 and T2/T7)  - underwent immunotherapy on 12/2 and developed a hypersensitivity reaction  - was scheduled for chemo on 12/27 with Dr. Chapa ([facility]).   - s/p inpatient Chemo Herceptin and Docetaxel on 12/27.   - Hem/Onc following  - PPI while on steroids  - Discussed w/ Oncology recommending discharge on steroid taper. Will d/c Solumedrol and start Prednisone 40mg q12h.    Anxiety  - on Xanax at home  - Ativan prn  - continue escitalopram which was recently started    Back Pain due to Pathologic Compression Fractures  - known C3/T2 compression fractures  - CT angio with new T7 compression fracture with mild retropulsion   - Obtain dedicated imaging of T-spine  - MRIs reviewed from November 2021  - CT with C3, C4, C6, T2 and T7, and L1 compression fx.   - MRI reviewed  - Continue Morphine ER 30 mg TID  - IV dilaudid for mod/severe pain as needed  - Continue muscle relaxer - on carisoprodol as needed  - Ortho recs appreciated - TLSO brace  - PT consult pending    Moderate Pericardial Effusion  - likely malignant  - TNI negative; BNP within normal range  - patient reports effusion was small on recent TTE at [facility] on 12/12 or 12/13  - patient was seen by [facility] cardio at that time  - TTE - here - moderate pericardial effusion - possible early cardiac tamponade physiology - not a suitable candidate for pericardial window placement at this time per thoracic Sx.    - s/p pericardiocentesis on 12/28, 250cc serous fluid removed.  - Started on Colchicine  - Repeat TTE today    KURT, resolved  - encouraged hydration.     Constipation, resolved  - senna and Miralax    DVT ppx Lovenox    Dispo: Pending PT consult, Cardio clearance.

## 2022-01-03 NOTE — PROGRESS NOTE ADULT - REASON FOR ADMISSION
SOB

## 2022-01-03 NOTE — DISCHARGE NOTE NURSING/CASE MANAGEMENT/SOCIAL WORK - NSDCPEFALRISK_GEN_ALL_CORE
For information on Fall & Injury Prevention, visit: [email or web address] OR  [email or web address] OR  https://www.cdc.gov/steadi/patient.html

## 2022-01-03 NOTE — PHYSICAL THERAPY INITIAL EVALUATION ADULT - ADDITIONAL COMMENTS
Pt. states she has 3 steps to enter the house with handrail.  Will stay on the first floor and has home O2

## 2022-01-03 NOTE — DISCHARGE NOTE NURSING/CASE MANAGEMENT/SOCIAL WORK - PATIENT PORTAL LINK FT
You can access the FollowMyHealth Patient Portal offered by [facility] by registering at the following website: [email or web address]. By joining [employer]’s FollowMyHealth portal, you will also be able to view your health information using other applications (apps) compatible with our system.

## 2022-01-03 NOTE — PROGRESS NOTE ADULT - ASSESSMENT
Assessment  s/p perc drainage pericardial effusion- serous - no pathology available as yet  metastatic breast cancer  pleural effusions stable      Rec  agre with colchicine  ECG today  repeat echo today

## 2022-01-03 NOTE — PROGRESS NOTE ADULT - SUBJECTIVE AND OBJECTIVE BOX
ORTHO-SPINE PROGRESS NOTE:    Pt Name: NATIVIDAD MYERS    MRN: [id number]      Patient is a 36 year old female being followed for compression fx. patient with no complaints today       PAST MEDICAL & SURGICAL HISTORY:  PAST MEDICAL & SURGICAL HISTORY:  Breast CA    H/O compression fracture of spine    Anxiety    S/P tonsillectomy        Allergies: Perjeta (Other (Severe))  pertuzumab (Other (Severe))      Medications: acetaminophen     Tablet .. 650 milliGRAM(s) Oral every 6 hours PRN  albuterol/ipratropium for Nebulization 3 milliLiter(s) Nebulizer every 6 hours PRN  Carisoprodol 250 milliGRAM(s) 250 milliGRAM(s) Oral four times a day PRN  colchicine 0.6 milliGRAM(s) Oral every 12 hours  enoxaparin Injectable 40 milliGRAM(s) SubCutaneous daily  escitalopram 5 milliGRAM(s) Oral daily  guaiFENesin Oral Liquid (Sugar-Free) 200 milliGRAM(s) Oral every 6 hours PRN  HYDROmorphone  Injectable 0.5 milliGRAM(s) IV Push every 4 hours PRN  HYDROmorphone  Injectable 1 milliGRAM(s) IV Push every 4 hours PRN  LORazepam   Injectable 0.5 milliGRAM(s) IV Push three times a day PRN  methylPREDNISolone sodium succinate Injectable 40 milliGRAM(s) IV Push every 8 hours  morphine ER Tablet 30 milliGRAM(s) Oral three times a day  ondansetron Injectable 4 milliGRAM(s) IV Push every 6 hours PRN  pantoprazole    Tablet 40 milliGRAM(s) Oral before breakfast  polyethylene glycol 3350 17 Gram(s) Oral daily  promethazine 12.5 milliGRAM(s) Oral two times a day PRN  senna 2 Tablet(s) Oral at bedtime        Ambulation: Walking independently [ ] With Cane [ ] With Walker [ ]  Bedbound [ ]                           11.1   2.32  )-----------( 438      ( 03 Jan 2022 05:16 )             34.1     01-03    135  |  100  |  24.1<H>  ----------------------------<  140<H>  4.7   |  25.0  |  0.63    Ca    7.7<L>      03 Jan 2022 05:16    TPro  5.1<L>  /  Alb  2.9<L>  /  TBili  0.6  /  DBili  x   /  AST  27  /  ALT  46<H>  /  AlkPhos  189<H>  01-03      PHYSICAL EXAM:    Vital Signs Last 24 Hrs  T(C): 36.9 (03 Jan 2022 04:33), Max: 37.1 (02 Jan 2022 20:00)  T(F): 98.4 (03 Jan 2022 04:33), Max: 98.7 (02 Jan 2022 20:00)  HR: 92 (03 Jan 2022 04:33) (72 - 92)  BP: 131/93 (03 Jan 2022 04:33) (121/70 - 134/84)  BP(mean): 94 (03 Jan 2022 00:33) (94 - 99)  RR: 20 (03 Jan 2022 04:33) (16 - 20)  SpO2: 97% (03 Jan 2022 04:33) (96% - 99%)  Daily     Daily     Appearance: Alert, responsive, in no acute distress.      Skin: no rash on visible skin. Skin is clean, dry and intact. No bleeding. No abrasions. No ulcerations.             Sensation:          Upper extremity                ax        mc           m          u          r                                                         R          +           +             +           +          +                                               L           +           +             +           +          +         Lower extremity             sp         dp         saph       peterson         tibial                                                R          +           +             +           +          +                                               L           +           +             +           +          +                     Motor exam:          Upper extremity              Bi(c5)  WE(c6)  EE(c7)   FF(c8)                                                R         5/5        5/5        5/5       5/5                                               L          5/5        5/5        5/5       5/5         Lower extremity                        HF(l2)   KE(l3)    TA(l4)   EHL(l5)  GS(s1)                                                 R          5/5        5/5       5/5       5/5         5/5                                               L         5/5        5/5       5/5       5/5          5/5      A/P:  Pt is a 36y Female with C3, C6, T2     PLAN:   * no acute ortho intervention   Follow up Dr. Santana in office

## 2022-01-03 NOTE — PROGRESS NOTE ADULT - SUBJECTIVE AND OBJECTIVE BOX
[facility], THE [facility]                                   [address] [place] [address]                                                      PHONE: [phone]                                                         FAX: [phone]  [email or web address]  ---------------------------------------------------------------------------------------------------------------------------------    Overnight events/patient complaints: feels well, no sig pleuritic pain, tele unremarkable      Perjeta (Other (Severe))  pertuzumab (Other (Severe))    MEDICATIONS  (STANDING):  colchicine 0.6 milliGRAM(s) Oral every 12 hours  enoxaparin Injectable 40 milliGRAM(s) SubCutaneous daily  escitalopram 5 milliGRAM(s) Oral daily  methylPREDNISolone sodium succinate Injectable 40 milliGRAM(s) IV Push every 8 hours  morphine ER Tablet 30 milliGRAM(s) Oral three times a day  pantoprazole    Tablet 40 milliGRAM(s) Oral before breakfast  polyethylene glycol 3350 17 Gram(s) Oral daily  senna 2 Tablet(s) Oral at bedtime    MEDICATIONS  (PRN):  acetaminophen     Tablet .. 650 milliGRAM(s) Oral every 6 hours PRN Temp greater or equal to 38C (100.4F), Mild Pain (1 - 3)  albuterol/ipratropium for Nebulization 3 milliLiter(s) Nebulizer every 6 hours PRN Shortness of Breath and/or Wheezing  Carisoprodol 250 milliGRAM(s) 250 milliGRAM(s) Oral four times a day PRN muscle spasm  guaiFENesin Oral Liquid (Sugar-Free) 200 milliGRAM(s) Oral every 6 hours PRN Cough  HYDROmorphone  Injectable 0.5 milliGRAM(s) IV Push every 4 hours PRN Moderate Pain (4 - 6)  HYDROmorphone  Injectable 1 milliGRAM(s) IV Push every 4 hours PRN Severe Pain (7 - 10)  LORazepam   Injectable 0.5 milliGRAM(s) IV Push three times a day PRN Anxiety  ondansetron Injectable 4 milliGRAM(s) IV Push every 6 hours PRN Nausea and/or Vomiting  promethazine 12.5 milliGRAM(s) Oral two times a day PRN nausea      Vital Signs Last 24 Hrs  T(C): 36.8 (2022 08:00), Max: 37.1 (2022 20:00)  T(F): 98.3 (2022 08:00), Max: 98.7 (2022 20:00)  HR: 73 (2022 08:00) (72 - 92)  BP: 145/88 (2022 08:00) (121/70 - 145/88)  BP(mean): 107 (2022 08:00) (94 - 107)  RR: 20 (2022 04:33) (16 - 20)  SpO2: 94% (2022 08:00) (94% - 99%)  Daily     Daily   ICU Vital Signs Last 24 Hrs  NATIVIDAD MYERS  I&O's Detail    I&O's Summary    Drug Dosing Weight  NATIVIDAD FREDERICKOS      PHYSICAL EXAM:  General: Appears well developed, well nourished alert and cooperative.  HEENT: Head; normocephalic, atraumatic.  Eyes: Pupils reactive, cornea wnl.  Neck: Supple, no nodes adenopathy, no NVD or carotid bruit or thyromegaly.  CARDIOVASCULAR: Normal S1 and S2, No murmur, rub, gallop or lift.   LUNGS: decreased BS bases  ABDOMEN: Soft, nontender without mass or organomegaly. bowel sounds normoactive.  EXTREMITIES: No clubbing, cyanosis or edema. Distal pulses wnl.   SKIN: warm and dry with normal turgor.  NEURO: Alert/oriented x 3/normal motor exam. No pathologic reflexes.    PSYCH: normal affect.        LABS:                        11.1   2.32  )-----------( 438      ( 2022 05:16 )             34.1     01-03    135  |  100  |  24.1<H>  ----------------------------<  140<H>  4.7   |  25.0  |  0.63    Ca    7.7<L>      2022 05:16    TPro  5.1<L>  /  Alb  2.9<L>  /  TBili  0.6  /  DBili  x   /  AST  27  /  ALT  46<H>  /  AlkPhos  189<H>      NATIVIDAD MYERS            RADIOLOGY & ADDITIONAL STUDIES:    INTERPRETATION OF TELEMETRY (personally reviewed):    ECG:< from: 12 Lead ECG (21 @ 21:24) >    Diagnosis Line   Sinus tachycardia        Confirmed by CARMELO MOHAMUD ([id number]) on 2021 10:46:48 AM    < end of copied text >      ECHO:< from: TTE Echo Limited or F/U (21 @ 09:24) >  Summary:   1. Left ventricular ejection fraction, by visual estimation, is 60 to   65%.   2. Patient is s/p pericardiocentesis. The previously seen moderate sized   pericardial effusion is resolved.    MD Maria Victoria Electronically signed on 2021 at 9:24:33 AM    < end of copied text >      < from: Cardiac Catheterization (21 @ 07:23) >  PROCEDURE:  --  Right heart catheterization.  --  Sonosite.  TECHNIQUE: Cardiac catheterization performed electively.  Local anesthetic given. Right femoral vein access. Right femoral artery  access. Right heart catheterization. The procedure was performed utilizing  a catheter. Sonosite.  MEDICATIONS GIVEN: Midazolam,1 mg, IV. Fentanyl, 50 mcg, IV. 1% Lidocaine,  10 ml, subcutaneously.  COMPLICATIONS: There were no complications.  DIAGNOSTIC IMPRESSIONS: Moderate pulmonary HTN/RA pressure 15 w resp  variation preserved.  250 cc of serous fluid removed from pericardial space w/ clearance of  effusion confirmed by echo.  Catheter removed.  DIAGNOSTIC RECOMMENDATIONS: To begin chemotherapy.  Echo/followup in 1 week.  INTERVENTIONAL IMPRESSIONS: Moderate pulmonary HTN/RA pressure 15 w resp  variation preserved.  250 cc of serous fluid removed from pericardial space w/ clearance of  effusion confirmed by echo.  Catheter removed.  INTERVENTIONAL RECOMMENDATIONS: To begin chemotherapy.  Echo/followup in 1 week.  Prepared and signed by  Jorge Seals MD  Signed 2021 08:20:05  HEMODYNAMIC TABLES  Pressures:  Baseline  Pressures:  - HR: 94  Pressures:  - Rhythm:  Pressures:  -- Pulmonary Artery (S/D/M): 35/14/24  Pressures:  -- Pulmonary Capillary Wedge: /16  Pressures:  -- Right Atrium (a/v/M):   Pressures:  -- Right Ventricle (s/edp): 46/17/--  Outputs:  Baseline  Outputs:  -- CALCULATIONS[age]  Outputs:  -- CALCULATIONS: Body Surface Area: 1.76  Outputs:  -- CALCULATIONS: Height in cm: 155.00  Outputs:  -- CALCULATIONS: Sex: Female  Outputs:  -- CALCULATIONS: Weight in k.70    < end of copied text >

## 2022-01-03 NOTE — CHART NOTE - NSCHARTNOTEFT_GEN_A_CORE
Recurrent LT pleural effusion in setting of metastatic breast CA.   CT placed and later dislodged.  CXR without reaccumulation of fluid, however moderate drainage from insertion site. Dressing changes PRN  Patient given option for Pleurx catheter and she deferred it at this time.  Likely to be discharged today per primary team.  Will need repeat TTE to assess reaccumulation of pericardial effusion either before discharge or as outpatient  Thoracic surgery to sign off at this time. Please re-consult as necessary.  D/w Dr. Heredia.
Site check Post pericardiocentesis  .78  hr 78  Monitior  Nsr 78  Pericardial site clear   right groin good pulse foot warm with + pulses
Left pigtail dislodged, remains on 6L sat 100% no respiratory distess noted, chest xray obtained. Dr Sow aware.  Pt. scheduled for Pleurex on Monday 1/3.

## 2022-01-06 LAB — VIRUS SPEC CULT: SIGNIFICANT CHANGE UP

## 2022-01-12 ENCOUNTER — APPOINTMENT (OUTPATIENT)
Dept: THORACIC SURGERY | Facility: CLINIC | Age: 37
End: 2022-01-12
Payer: MEDICAID

## 2022-01-12 DIAGNOSIS — J91.0 MALIGNANT PLEURAL EFFUSION: ICD-10-CM

## 2022-01-12 DIAGNOSIS — I31.3 PERICARDIAL EFFUSION (NONINFLAMMATORY): ICD-10-CM

## 2022-01-12 PROCEDURE — 99213 OFFICE O/P EST LOW 20 MIN: CPT

## 2022-01-12 NOTE — ASSESSMENT
[FreeTextEntry1] : Today Ms Dave reports to the office for follow up care. She states her breathing has dramatically improved and she has had no fevers. On Monday she will be receiving a chemotherapy infusion. \par We discussed that there is no true etiology of why the effusion had developed and that there is a possibility that it could resolve now that chemotherapy has begun. She will be following up with me as needed. \par \par PLAN:\par - Return to care as needed\par \par \par \par \par \par IJose NP am scribing for and in the presence of Dr. Sow the following sections HISTORY OF PRESENT ILLNESS, PAST MEDICAL/FAMILY/SOCIAL HISTORY; REVIEW OF SYSTEMS; VITAL SIGNS; PHYSICAL EXAM; DISPOSITION.\par \par "I personally performed the services described in the documentation, reviewed the documentation recorded by the scribe in my presence and accurately and completely records my words and actions."\par

## 2022-01-12 NOTE — DATA REVIEWED
[FreeTextEntry1] : Hospital records from January admission at [facility] \par \par \par Pathology\par Collected Date/Time:                   12/28/2021 16:22 EST\par Received Date/Time:                    12/28/2021 16:22 EST\par \par Non-Gynecologic Report - Auth (Verified)\par \par Specimen(s) Submitted\par PERICARDIAL FLUID\par \par Final Diagnosis\par PERICARDIAL FLUID\par POSITIVE FOR MALIGNANT CELLS.\par Metastatic adenocarcinoma, compatible with the patient's known     history of\par breast cancer.\par

## 2022-01-12 NOTE — REVIEW OF SYSTEMS
[Feeling Poorly] : not feeling poorly [Feeling Tired] : not feeling tired [Chest Pain] : no chest pain [Palpitations] : no palpitations [Shortness Of Breath] : no shortness of breath [SOB on Exertion] : shortness of breath during exertion [Negative] : Heme/Lymph

## 2022-01-12 NOTE — HISTORY OF PRESENT ILLNESS
[FreeTextEntry1] : Patient is a 36 year old female with recently diagnosed Metastatic Right Breast CA (Infiltrating Ductal Carcinoma with mets to the Spine and Liver), Malignant Pleural Effusion on Home O2 (3-5 liters), Pathologic Compression Fractures (Follows with Dr. Moya), Anxiety, and Muscle Spasms who presented to the ED with complaints of worsening SOB and found to have bilateral pleural effusions, left sided opacities and worsening lymphangitic spread of disease. \par \par She was empirically started on Abx which were discontinued after cultures were negative and PNA was ruled out. ID was consulted. Pulm, CTS were consulted and she had chest tube placement on 12/23 per CTS with 1L drained. TTE  was done and showed moderate to severe pericardial effusion with cardiac tamponade physiology. Cardiology was consulted and it was discussed extensively with Cardio, CTS for possible pericardial window - but at this time, she is not deemed a good candidate for this. Repeat TTE showed persistent pericardial effusion. She underwent pericardiocentesis on 12/28 with 250cc removed. Patient was started on Colchicine per Cardiology recommendations. Heme/Oncology was consulted and patient received inpatient chemo on 12/27. \par \par Patient's chest tube was removed. Pleurx was planned however as patient CXR did not show reaccumulation, Pleurx was deferred by patient by patient and CTS. Repeat TTE \par 1/3: no pericardial effusion. \par \par Today Ms Dave reports to the office for follow up care. She states her breathing has dramatically improved and she has had no fevers. On Monday she will be receiving a chemotherapy infusion.

## 2022-01-13 ENCOUNTER — APPOINTMENT (OUTPATIENT)
Dept: SURGERY | Facility: CLINIC | Age: 37
End: 2022-01-13

## 2022-01-22 LAB
CULTURE RESULTS: SIGNIFICANT CHANGE UP
SPECIMEN SOURCE: SIGNIFICANT CHANGE UP

## 2022-01-26 LAB
CULTURE RESULTS: SIGNIFICANT CHANGE UP
SPECIMEN SOURCE: SIGNIFICANT CHANGE UP

## 2022-02-04 ENCOUNTER — OUTPATIENT (OUTPATIENT)
Dept: OUTPATIENT SERVICES | Facility: HOSPITAL | Age: 37
LOS: 1 days | End: 2022-02-04
Payer: COMMERCIAL

## 2022-02-04 DIAGNOSIS — C50.419 MALIGNANT NEOPLASM OF UPPER-OUTER QUADRANT OF UNSPECIFIED FEMALE BREAST: ICD-10-CM

## 2022-02-04 DIAGNOSIS — Z90.89 ACQUIRED ABSENCE OF OTHER ORGANS: Chronic | ICD-10-CM

## 2022-02-04 DIAGNOSIS — R93.7 ABNORMAL FINDINGS ON DIAGNOSTIC IMAGING OF OTHER PARTS OF MUSCULOSKELETAL SYSTEM: ICD-10-CM

## 2022-02-04 PROCEDURE — 93306 TTE W/DOPPLER COMPLETE: CPT | Mod: 26

## 2022-02-04 PROCEDURE — 93306 TTE W/DOPPLER COMPLETE: CPT

## 2022-02-10 ENCOUNTER — APPOINTMENT (OUTPATIENT)
Dept: RADIATION ONCOLOGY | Facility: CLINIC | Age: 37
End: 2022-02-10
Payer: MEDICAID

## 2022-02-10 ENCOUNTER — OUTPATIENT (OUTPATIENT)
Dept: OUTPATIENT SERVICES | Facility: HOSPITAL | Age: 37
LOS: 1 days | Discharge: HOME | End: 2022-02-10
Payer: MEDICAID

## 2022-02-10 VITALS
WEIGHT: 155 LBS | OXYGEN SATURATION: 93 % | RESPIRATION RATE: 16 BRPM | HEART RATE: 102 BPM | HEIGHT: 61 IN | BODY MASS INDEX: 29.27 KG/M2 | SYSTOLIC BLOOD PRESSURE: 143 MMHG | DIASTOLIC BLOOD PRESSURE: 91 MMHG

## 2022-02-10 DIAGNOSIS — Z78.9 OTHER SPECIFIED HEALTH STATUS: ICD-10-CM

## 2022-02-10 DIAGNOSIS — Z90.89 ACQUIRED ABSENCE OF OTHER ORGANS: Chronic | ICD-10-CM

## 2022-02-10 PROCEDURE — 99204 OFFICE O/P NEW MOD 45 MIN: CPT | Mod: 25

## 2022-02-10 PROCEDURE — 77263 THER RADIOLOGY TX PLNG CPLX: CPT

## 2022-02-10 RX ORDER — HYDROMORPHONE HYDROCHLORIDE 8 MG/1
TABLET ORAL
Refills: 0 | Status: ACTIVE | COMMUNITY

## 2022-02-10 RX ORDER — PREGABALIN 300 MG/1
CAPSULE ORAL
Refills: 0 | Status: ACTIVE | COMMUNITY

## 2022-02-10 RX ORDER — MORPHINE SULFATE 30 MG/1
TABLET ORAL
Refills: 0 | Status: ACTIVE | COMMUNITY

## 2022-02-10 NOTE — VITALS
[Maximal Pain Intensity: 7/10] : 7/10 [Least Pain Intensity: 2/10] : 2/10 [Pain Description/Quality: ___] : Pain description/quality: [unfilled] [Pain Duration: ___] : Pain duration: [unfilled] [Pain Location: ___] : Pain Location: [unfilled] [Pain Interferes with ADLs] : Pain interferes with activities of daily living. [Opioid] : opioid [70: Cares for self; unalbe to carry on normal activity or do active work.] : 70: Cares for self; unable to carry on normal activity or do active work. [ECOG Performance Status: 2 - Ambulatory and capable of all self care but unable to carry out any work activities] : Performance Status: 2 - Ambulatory and capable of all self care but unable to carry out any work activities. Up and about more than 50% of waking hours [8 - Distress Level] : Distress Level: 8 [Patient given social work contact information and resource sheet] : Patient was given social work contact information and resource sheet

## 2022-02-10 NOTE — REASON FOR VISIT
[Breast Cancer] : breast cancer [Other: ___] : [unfilled] [Family Member] : family member [Consideration for Non-Curative Therapy] : consideration for non-curative therapy for

## 2022-02-11 PROBLEM — Z78.9 SOCIAL ALCOHOL USE: Status: ACTIVE | Noted: 2018-03-15

## 2022-02-11 PROBLEM — Z78.9 NEVER SMOKED TOBACCO: Status: ACTIVE | Noted: 2018-03-15

## 2022-02-11 NOTE — LETTER GREETING
[Dear Doctor] : Dear Doctor, [Consult Letter:] : Your patient, [unfilled] was seen in my office today for consultation. [Please see my note below.] : Please see my note below. [FreeTextEntry2] : Michela Noriega MD\par Angie Chapa MD

## 2022-02-11 NOTE — END OF VISIT
Addendum  created 03/19/18 1428 by King Abdi CRNA    Anesthesia Intra Meds edited [Time Spent: ___ minutes] : I have spent [unfilled] minutes of time on the encounter.

## 2022-02-11 NOTE — LETTER CLOSING
[Consult Closing:] : Thank you for allowing me to participate in the care of this patient.  If you have any questions, please do not hesitate to contact me. [Sincerely yours,] : Sincerely yours, [FreeTextEntry3] : Huy Mohan MD\par Physician in Chief\par Department of Radiation Medicine\par [facility] [place]\par [facility] [place]\par \par  of Radiation Medicine\par [facility]\par at  [facility]\par \par Radiation \par [facility]par [facility] [place]\par [address]\par [place], [place] [address]\par \par Tel: [phone]\par Fax: [phone]\par

## 2022-02-11 NOTE — DISEASE MANAGEMENT
[IV] : IV [Clinical] : TNM Stage: c [FreeTextEntry4] : adenocarcinoma of breast [TTNM] : 2 [NTNM] : 3 [MTNM] : 1 [de-identified] : 2000cGy [de-identified] : Lumbosacral spine and pelvis

## 2022-02-11 NOTE — PHYSICAL EXAM
[Normal] : no joint swelling, no clubbing or cyanosis of the fingernails and muscle strength and tone were normal [de-identified] : no obvious deformity nor tenderness

## 2022-02-11 NOTE — REVIEW OF SYSTEMS
[Patient Intake Form Reviewed] : Patient intake form was reviewed [Negative] : Allergic/Immunologic [FreeTextEntry6] : Hx pulmonary embolism, lung metastasis [FreeTextEntry9] : multiple scattered bone metastasis spine, pelvis, right proximal humerous [de-identified] : Hx embolism

## 2022-02-11 NOTE — HISTORY OF PRESENT ILLNESS
[FreeTextEntry1] : This 36 year-old female presents for radiation medicine consultation accompanied by her father Santiago.  Ms. Dave was diagnosed with stage IV metastatic breast cancer (Right IDC +ILC grade 3, ER - ,HI - ,Her2 +, metastatic). \par \par Ms Dave reports having neck and back pain starting in September 2021. She thought it was job related since she works in the [employer] which requires heavy lifting.   She then started having spasms while at work and was sent to the ER as [employer].   She was given morphine and muscle relaxants which helped, and was referred to ortho.  When symptoms became worse she was then sent for imaging.  She was doing PT and trigger injections at the time. \par \par CT of spine 11/3/2021:  \par Liver masses, bone lesions, and mildly enlarged lymph node. \par \par Liver mass biopsy 11/2/2021:\par Positive adenocarcinoma consistent with primary breast ER-, HI -, Her2+. \par \par Ms. Dave underwent additional breast imaging which suggested need for breast biopsy Right breast 10:00 was biopsied demonstrating IDC + ILC grade 3 ER -HI -Her2 negative. Her medical oncologist is .\par \par 11/8/2021 CT C/A/P:\par 1. Liver masses, bone lesions, and mildly enlarged lymph nodes in the chest and abdomen, highly concerning for metastatic disease.\par 2. Interlobular septal thickening in the lungs which may represent edema or lymphangitic spread of tumor. Mild patchy groundglass opacities may be related to edema or infection although neoplasm cannot be excluded. Small nodular densities measuring up to 7 mm in the left upper lobe. These may be benign although metastatic disease cannot be excluded.\par \par 11/8/2021 Bone scan:\par IMPRESSION: Abnormal bone scan consistent with multiple osseous metastases in the sternum, ribs, spine, pelvis, and proximal right humerus.\par \par 11/17/2021 Pathology:\par 1. RIGHT BREAST, 10:00, 8 CM FROM NIPPLE, ULTRASOUND GUIDED CORE BIOPSY:\par -INFILTRATING CARCINOMA WITH DUCTAL AND LOBULAR FEATURES, GRADE 3 TRICIA SCORE 8/9 (3,3,2)\par -DUCTAL CARCINOMA IN SITU, COMEDO TYPE WITH CALCIFICATIONS, NUCLEAR GRADE 2\par -TUMOR SIZE (INFILTRATING) APPROXIMATELY 0.7 CM\par -CANNOT EXCLUDE LYMPHOVASCULAR INVASION\par 2. LEFT BREAST AXILLA, ULTRASOUND GUIDED CORE BIOPSY:\par -LYMPHOID TISSUE WITH REACTIVE GERMINAL CENTERS\par -NO CARCINOMA IDENTIFIED\par These results are CONCORDANT\par RECOMMENDATION: Surgical or oncologic management.\par \par 11/30/2021 PET CT:\par IMPRESSION: Abnormal uterine FDG-PET/CT scan.\par 1. FDG-avid mass, lateral aspect right breast corresponds to biopsy-proven malignancy.\par 2. Multiple FDG-avid lymph nodes in right axilla, right retropectoral region, and retroperitoneum are compatible with metastatic disease.\par 3. Multiple FDG-avid bilobar hepatic metastases, best delineated on recent contrast-enhanced CT.\par 4. FDG-avid diffuse bilateral interlobular septal thickening and groundglass and nodular bilateral pulmonary opacities, increased as compared to CT dated 11/3/2021, are compatible with lymphangitic spread of tumor. Correlate clinically to exclude superimposed infection.\par \par Ms. Dave has been undergoing chemotherapy with Dr. Chapa.  She reports having a pulmonary embolism after the first cycle for which she was hospitalized at [facility].

## 2022-02-16 LAB
CULTURE RESULTS: SIGNIFICANT CHANGE UP
SPECIMEN SOURCE: SIGNIFICANT CHANGE UP

## 2022-02-16 PROCEDURE — 77290 THER RAD SIMULAJ FIELD CPLX: CPT | Mod: 26

## 2022-02-16 PROCEDURE — 77334 RADIATION TREATMENT AID(S): CPT | Mod: 26

## 2022-02-17 PROCEDURE — 77334 RADIATION TREATMENT AID(S): CPT | Mod: 26

## 2022-02-17 PROCEDURE — 77307 TELETHX ISODOSE PLAN CPLX: CPT | Mod: 26

## 2022-02-18 PROCEDURE — 77280 THER RAD SIMULAJ FIELD SMPL: CPT | Mod: 26

## 2022-02-22 ENCOUNTER — NON-APPOINTMENT (OUTPATIENT)
Age: 37
End: 2022-02-22

## 2022-02-22 VITALS
RESPIRATION RATE: 16 BRPM | DIASTOLIC BLOOD PRESSURE: 85 MMHG | HEART RATE: 103 BPM | BODY MASS INDEX: 29.02 KG/M2 | WEIGHT: 153.6 LBS | SYSTOLIC BLOOD PRESSURE: 133 MMHG | OXYGEN SATURATION: 96 %

## 2022-02-22 NOTE — DISEASE MANAGEMENT
[Clinical] : TNM Stage: c [FreeTextEntry4] : adenocarcinoma of breast [TTNM] : 2 [NTNM] : 3 [MTNM] : 1 [IV] : IV [de-identified] : 800 cGy [de-identified] : 2000 cGy [de-identified] : Lumbosacral spine and pelvis

## 2022-02-22 NOTE — VITALS
[Maximal Pain Intensity: 6/10] : 6/10 [Least Pain Intensity: 1/10] : 1/10 [Pain Description/Quality: ___] : Pain description/quality: [unfilled] [Pain Duration: ___] : Pain duration: [unfilled] [Pain Location: ___] : Pain Location: [unfilled] [Pain Interferes with ADLs] : Pain interferes with activities of daily living. [Opioid] : opioid [70: Cares for self; unalbe to carry on normal activity or do active work.] : 70: Cares for self; unable to carry on normal activity or do active work. [ECOG Performance Status: 2 - Ambulatory and capable of all self care but unable to carry out any work activities] : Performance Status: 2 - Ambulatory and capable of all self care but unable to carry out any work activities. Up and about more than 50% of waking hours

## 2022-02-28 ENCOUNTER — NON-APPOINTMENT (OUTPATIENT)
Age: 37
End: 2022-02-28

## 2022-02-28 VITALS
RESPIRATION RATE: 15 BRPM | HEART RATE: 88 BPM | DIASTOLIC BLOOD PRESSURE: 86 MMHG | OXYGEN SATURATION: 96 % | SYSTOLIC BLOOD PRESSURE: 133 MMHG

## 2022-02-28 PROCEDURE — 77427 RADIATION TX MANAGEMENT X5: CPT

## 2022-02-28 NOTE — DISEASE MANAGEMENT
[Clinical] : TNM Stage: c [IV] : IV [FreeTextEntry4] : adenocarcinoma of breast [TTNM] : 2 [NTNM] : 3 [MTNM] : 1 [de-identified] : 2000 cGy [de-identified] : 2000 cGy [de-identified] : Lumbosacral spine and pelvis

## 2022-02-28 NOTE — VITALS
[Pain Description/Quality: ___] : Pain description/quality: [unfilled] [Pain Duration: ___] : Pain duration: [unfilled] [Pain Location: ___] : Pain Location: [unfilled] [Pain Interferes with ADLs] : Pain interferes with activities of daily living. [Opioid] : opioid [70: Cares for self; unalbe to carry on normal activity or do active work.] : 70: Cares for self; unable to carry on normal activity or do active work. [ECOG Performance Status: 2 - Ambulatory and capable of all self care but unable to carry out any work activities] : Performance Status: 2 - Ambulatory and capable of all self care but unable to carry out any work activities. Up and about more than 50% of waking hours [Maximal Pain Intensity: 4/10] : 4/10 [Least Pain Intensity: 0/10] : 0/10

## 2022-03-07 ENCOUNTER — INPATIENT (INPATIENT)
Facility: HOSPITAL | Age: 37
LOS: 2 days | Discharge: HOME | DRG: 809 | End: 2022-03-10
Attending: STUDENT IN AN ORGANIZED HEALTH CARE EDUCATION/TRAINING PROGRAM | Admitting: INTERNAL MEDICINE
Payer: MEDICAID

## 2022-03-07 VITALS
OXYGEN SATURATION: 97 % | DIASTOLIC BLOOD PRESSURE: 71 MMHG | HEART RATE: 125 BPM | SYSTOLIC BLOOD PRESSURE: 98 MMHG | TEMPERATURE: 98 F | WEIGHT: 160.06 LBS | HEIGHT: 61 IN | RESPIRATION RATE: 20 BRPM

## 2022-03-07 DIAGNOSIS — D70.9 NEUTROPENIA, UNSPECIFIED: ICD-10-CM

## 2022-03-07 DIAGNOSIS — Z90.89 ACQUIRED ABSENCE OF OTHER ORGANS: Chronic | ICD-10-CM

## 2022-03-07 LAB
ALBUMIN SERPL ELPH-MCNC: 3.9 G/DL — SIGNIFICANT CHANGE UP (ref 3.3–5.2)
ALP SERPL-CCNC: 118 U/L — SIGNIFICANT CHANGE UP (ref 40–120)
ALT FLD-CCNC: 114 U/L — HIGH
ANION GAP SERPL CALC-SCNC: 20 MMOL/L — HIGH (ref 5–17)
ANISOCYTOSIS BLD QL: SLIGHT — SIGNIFICANT CHANGE UP
APPEARANCE UR: CLEAR — SIGNIFICANT CHANGE UP
APTT BLD: 33.3 SEC — SIGNIFICANT CHANGE UP (ref 27.5–35.5)
AST SERPL-CCNC: 17 U/L — SIGNIFICANT CHANGE UP
BACTERIA # UR AUTO: ABNORMAL
BASE EXCESS BLDV CALC-SCNC: -2.2 MMOL/L — LOW (ref -2–3)
BASOPHILS # BLD AUTO: 0 K/UL — SIGNIFICANT CHANGE UP (ref 0–0.2)
BASOPHILS NFR BLD AUTO: 0 % — SIGNIFICANT CHANGE UP (ref 0–2)
BILIRUB SERPL-MCNC: 1.9 MG/DL — SIGNIFICANT CHANGE UP (ref 0.4–2)
BILIRUB UR-MCNC: NEGATIVE — SIGNIFICANT CHANGE UP
BLASTS # FLD: 2.7 % — HIGH (ref 0–0)
BUN SERPL-MCNC: 16 MG/DL — SIGNIFICANT CHANGE UP (ref 8–20)
CA-I SERPL-SCNC: 0.83 MMOL/L — LOW (ref 1.15–1.33)
CALCIUM SERPL-MCNC: 7.2 MG/DL — LOW (ref 8.6–10.2)
CHLORIDE BLDV-SCNC: 99 MMOL/L — SIGNIFICANT CHANGE UP (ref 98–107)
CHLORIDE SERPL-SCNC: 95 MMOL/L — LOW (ref 98–107)
CO2 SERPL-SCNC: 18 MMOL/L — LOW (ref 22–29)
COLOR SPEC: YELLOW — SIGNIFICANT CHANGE UP
CREAT SERPL-MCNC: 0.7 MG/DL — SIGNIFICANT CHANGE UP (ref 0.5–1.3)
DIFF PNL FLD: NEGATIVE — SIGNIFICANT CHANGE UP
EGFR: 116 ML/MIN/1.73M2 — SIGNIFICANT CHANGE UP
EOSINOPHIL # BLD AUTO: 0 K/UL — SIGNIFICANT CHANGE UP (ref 0–0.5)
EOSINOPHIL NFR BLD AUTO: 0 % — SIGNIFICANT CHANGE UP (ref 0–6)
EPI CELLS # UR: ABNORMAL
GAS PNL BLDV: 131 MMOL/L — LOW (ref 136–145)
GAS PNL BLDV: SIGNIFICANT CHANGE UP
GIANT PLATELETS BLD QL SMEAR: PRESENT — SIGNIFICANT CHANGE UP
GLUCOSE BLDV-MCNC: 118 MG/DL — HIGH (ref 70–99)
GLUCOSE SERPL-MCNC: 150 MG/DL — HIGH (ref 70–99)
GLUCOSE UR QL: NEGATIVE MG/DL — SIGNIFICANT CHANGE UP
HCG SERPL-ACNC: <4 MIU/ML — SIGNIFICANT CHANGE UP
HCO3 BLDV-SCNC: 23 MMOL/L — SIGNIFICANT CHANGE UP (ref 22–29)
HCT VFR BLD CALC: 33.3 % — LOW (ref 34.5–45)
HCT VFR BLDA CALC: 27 % — SIGNIFICANT CHANGE UP
HGB BLD CALC-MCNC: 9 G/DL — LOW (ref 11.7–16.1)
HGB BLD-MCNC: 11 G/DL — LOW (ref 11.5–15.5)
INR BLD: 1.66 RATIO — HIGH (ref 0.88–1.16)
KETONES UR-MCNC: ABNORMAL
LACTATE BLDV-MCNC: 1.7 MMOL/L — SIGNIFICANT CHANGE UP (ref 0.5–2)
LEUKOCYTE ESTERASE UR-ACNC: ABNORMAL
LYMPHOCYTES # BLD AUTO: 0.08 K/UL — LOW (ref 1–3.3)
LYMPHOCYTES # BLD AUTO: 6.7 % — LOW (ref 13–44)
MAGNESIUM SERPL-MCNC: 1.5 MG/DL — LOW (ref 1.6–2.6)
MCHC RBC-ENTMCNC: 26.6 PG — LOW (ref 27–34)
MCHC RBC-ENTMCNC: 33 GM/DL — SIGNIFICANT CHANGE UP (ref 32–36)
MCV RBC AUTO: 80.6 FL — SIGNIFICANT CHANGE UP (ref 80–100)
MONOCYTES # BLD AUTO: 0.61 K/UL — SIGNIFICANT CHANGE UP (ref 0–0.9)
MONOCYTES NFR BLD AUTO: 50.7 % — HIGH (ref 2–14)
MYELOCYTES NFR BLD: 5.3 % — HIGH (ref 0–0)
NEUTROPHILS # BLD AUTO: 0.38 K/UL — LOW (ref 1.8–7.4)
NEUTROPHILS NFR BLD AUTO: 28 % — LOW (ref 43–77)
NEUTS BAND # BLD: 4 % — SIGNIFICANT CHANGE UP (ref 0–8)
NITRITE UR-MCNC: NEGATIVE — SIGNIFICANT CHANGE UP
NRBC # BLD: 1 /100 — HIGH (ref 0–0)
PCO2 BLDV: 41 MMHG — SIGNIFICANT CHANGE UP (ref 39–42)
PH BLDV: 7.36 — SIGNIFICANT CHANGE UP (ref 7.32–7.43)
PH UR: 6 — SIGNIFICANT CHANGE UP (ref 5–8)
PHOSPHATE SERPL-MCNC: 2.7 MG/DL — SIGNIFICANT CHANGE UP (ref 2.4–4.7)
PLAT MORPH BLD: ABNORMAL
PLATELET # BLD AUTO: 246 K/UL — SIGNIFICANT CHANGE UP (ref 150–400)
PO2 BLDV: 50 MMHG — HIGH (ref 25–45)
POLYCHROMASIA BLD QL SMEAR: SIGNIFICANT CHANGE UP
POTASSIUM BLDV-SCNC: 3.2 MMOL/L — LOW (ref 3.5–5.1)
POTASSIUM SERPL-MCNC: 3.2 MMOL/L — LOW (ref 3.5–5.3)
POTASSIUM SERPL-SCNC: 3.2 MMOL/L — LOW (ref 3.5–5.3)
PROMYELOCYTES # FLD: 1.3 % — HIGH (ref 0–0)
PROT SERPL-MCNC: 7.3 G/DL — SIGNIFICANT CHANGE UP (ref 6.6–8.7)
PROT UR-MCNC: 15
PROTHROM AB SERPL-ACNC: 19.3 SEC — HIGH (ref 10.5–13.4)
RAPID RVP RESULT: DETECTED
RBC # BLD: 4.13 M/UL — SIGNIFICANT CHANGE UP (ref 3.8–5.2)
RBC # FLD: 17.5 % — HIGH (ref 10.3–14.5)
RBC BLD AUTO: ABNORMAL
RBC CASTS # UR COMP ASSIST: NEGATIVE /HPF — SIGNIFICANT CHANGE UP (ref 0–4)
RV+EV RNA SPEC QL NAA+PROBE: DETECTED
SAO2 % BLDV: 85.5 % — SIGNIFICANT CHANGE UP
SARS-COV-2 RNA SPEC QL NAA+PROBE: SIGNIFICANT CHANGE UP
SODIUM SERPL-SCNC: 133 MMOL/L — LOW (ref 135–145)
SP GR SPEC: 1.01 — SIGNIFICANT CHANGE UP (ref 1.01–1.02)
UROBILINOGEN FLD QL: NEGATIVE MG/DL — SIGNIFICANT CHANGE UP
VARIANT LYMPHS # BLD: 1.3 % — SIGNIFICANT CHANGE UP (ref 0–6)
WBC # BLD: 1.2 K/UL — LOW (ref 3.8–10.5)
WBC # FLD AUTO: 1.2 K/UL — LOW (ref 3.8–10.5)
WBC UR QL: SIGNIFICANT CHANGE UP /HPF (ref 0–5)

## 2022-03-07 PROCEDURE — 74177 CT ABD & PELVIS W/CONTRAST: CPT | Mod: 26,MA

## 2022-03-07 PROCEDURE — 99223 1ST HOSP IP/OBS HIGH 75: CPT

## 2022-03-07 PROCEDURE — 99285 EMERGENCY DEPT VISIT HI MDM: CPT

## 2022-03-07 PROCEDURE — 93010 ELECTROCARDIOGRAM REPORT: CPT

## 2022-03-07 PROCEDURE — 71045 X-RAY EXAM CHEST 1 VIEW: CPT | Mod: 26

## 2022-03-07 RX ORDER — METOCLOPRAMIDE HCL 10 MG
10 TABLET ORAL ONCE
Refills: 0 | Status: COMPLETED | OUTPATIENT
Start: 2022-03-07 | End: 2022-03-07

## 2022-03-07 RX ORDER — ONDANSETRON 8 MG/1
4 TABLET, FILM COATED ORAL EVERY 8 HOURS
Refills: 0 | Status: DISCONTINUED | OUTPATIENT
Start: 2022-03-07 | End: 2022-03-10

## 2022-03-07 RX ORDER — HYDROMORPHONE HYDROCHLORIDE 2 MG/ML
1 INJECTION INTRAMUSCULAR; INTRAVENOUS; SUBCUTANEOUS ONCE
Refills: 0 | Status: DISCONTINUED | OUTPATIENT
Start: 2022-03-07 | End: 2022-03-07

## 2022-03-07 RX ORDER — FENTANYL CITRATE 50 UG/ML
50 INJECTION INTRAVENOUS ONCE
Refills: 0 | Status: DISCONTINUED | OUTPATIENT
Start: 2022-03-07 | End: 2022-03-07

## 2022-03-07 RX ORDER — ESCITALOPRAM OXALATE 10 MG/1
5 TABLET, FILM COATED ORAL DAILY
Refills: 0 | Status: DISCONTINUED | OUTPATIENT
Start: 2022-03-07 | End: 2022-03-10

## 2022-03-07 RX ORDER — FAMOTIDINE 10 MG/ML
20 INJECTION INTRAVENOUS ONCE
Refills: 0 | Status: COMPLETED | OUTPATIENT
Start: 2022-03-07 | End: 2022-03-07

## 2022-03-07 RX ORDER — MAGNESIUM SULFATE 500 MG/ML
1 VIAL (ML) INJECTION ONCE
Refills: 0 | Status: COMPLETED | OUTPATIENT
Start: 2022-03-07 | End: 2022-03-07

## 2022-03-07 RX ORDER — CEFEPIME 1 G/1
2000 INJECTION, POWDER, FOR SOLUTION INTRAMUSCULAR; INTRAVENOUS EVERY 8 HOURS
Refills: 0 | Status: DISCONTINUED | OUTPATIENT
Start: 2022-03-07 | End: 2022-03-10

## 2022-03-07 RX ORDER — COLCHICINE 0.6 MG
0.6 TABLET ORAL EVERY 12 HOURS
Refills: 0 | Status: DISCONTINUED | OUTPATIENT
Start: 2022-03-07 | End: 2022-03-10

## 2022-03-07 RX ORDER — VANCOMYCIN HCL 1 G
1000 VIAL (EA) INTRAVENOUS ONCE
Refills: 0 | Status: COMPLETED | OUTPATIENT
Start: 2022-03-07 | End: 2022-03-07

## 2022-03-07 RX ORDER — PANTOPRAZOLE SODIUM 20 MG/1
40 TABLET, DELAYED RELEASE ORAL DAILY
Refills: 0 | Status: DISCONTINUED | OUTPATIENT
Start: 2022-03-07 | End: 2022-03-10

## 2022-03-07 RX ORDER — ALPRAZOLAM 0.25 MG
0.5 TABLET ORAL EVERY 8 HOURS
Refills: 0 | Status: DISCONTINUED | OUTPATIENT
Start: 2022-03-07 | End: 2022-03-10

## 2022-03-07 RX ORDER — ACETAMINOPHEN 500 MG
1000 TABLET ORAL ONCE
Refills: 0 | Status: COMPLETED | OUTPATIENT
Start: 2022-03-07 | End: 2022-03-07

## 2022-03-07 RX ORDER — FENTANYL CITRATE 50 UG/ML
25 INJECTION INTRAVENOUS ONCE
Refills: 0 | Status: DISCONTINUED | OUTPATIENT
Start: 2022-03-07 | End: 2022-03-07

## 2022-03-07 RX ORDER — HYDROMORPHONE HYDROCHLORIDE 2 MG/ML
2 INJECTION INTRAMUSCULAR; INTRAVENOUS; SUBCUTANEOUS EVERY 4 HOURS
Refills: 0 | Status: DISCONTINUED | OUTPATIENT
Start: 2022-03-07 | End: 2022-03-10

## 2022-03-07 RX ORDER — ONDANSETRON 8 MG/1
4 TABLET, FILM COATED ORAL ONCE
Refills: 0 | Status: COMPLETED | OUTPATIENT
Start: 2022-03-07 | End: 2022-03-07

## 2022-03-07 RX ORDER — POTASSIUM CHLORIDE 20 MEQ
10 PACKET (EA) ORAL ONCE
Refills: 0 | Status: COMPLETED | OUTPATIENT
Start: 2022-03-07 | End: 2022-03-07

## 2022-03-07 RX ORDER — FILGRASTIM 480MCG/1.6
480 VIAL (ML) INJECTION ONCE
Refills: 0 | Status: COMPLETED | OUTPATIENT
Start: 2022-03-07 | End: 2022-03-07

## 2022-03-07 RX ORDER — DEXTROSE MONOHYDRATE, SODIUM CHLORIDE, AND POTASSIUM CHLORIDE 50; .745; 4.5 G/1000ML; G/1000ML; G/1000ML
1000 INJECTION, SOLUTION INTRAVENOUS
Refills: 0 | Status: COMPLETED | OUTPATIENT
Start: 2022-03-07 | End: 2022-03-09

## 2022-03-07 RX ORDER — CEFEPIME 1 G/1
2000 INJECTION, POWDER, FOR SOLUTION INTRAMUSCULAR; INTRAVENOUS ONCE
Refills: 0 | Status: COMPLETED | OUTPATIENT
Start: 2022-03-07 | End: 2022-03-07

## 2022-03-07 RX ORDER — SODIUM CHLORIDE 9 MG/ML
2300 INJECTION, SOLUTION INTRAVENOUS ONCE
Refills: 0 | Status: COMPLETED | OUTPATIENT
Start: 2022-03-07 | End: 2022-03-07

## 2022-03-07 RX ADMIN — CEFEPIME 2000 MILLIGRAM(S): 1 INJECTION, POWDER, FOR SOLUTION INTRAMUSCULAR; INTRAVENOUS at 15:40

## 2022-03-07 RX ADMIN — HYDROMORPHONE HYDROCHLORIDE 1 MILLIGRAM(S): 2 INJECTION INTRAMUSCULAR; INTRAVENOUS; SUBCUTANEOUS at 17:56

## 2022-03-07 RX ADMIN — Medication 10 MILLIGRAM(S): at 15:28

## 2022-03-07 RX ADMIN — FAMOTIDINE 20 MILLIGRAM(S): 10 INJECTION INTRAVENOUS at 17:31

## 2022-03-07 RX ADMIN — HYDROMORPHONE HYDROCHLORIDE 1 MILLIGRAM(S): 2 INJECTION INTRAMUSCULAR; INTRAVENOUS; SUBCUTANEOUS at 21:39

## 2022-03-07 RX ADMIN — Medication 1000 MILLIGRAM(S): at 15:41

## 2022-03-07 RX ADMIN — HYDROMORPHONE HYDROCHLORIDE 2 MILLIGRAM(S): 2 INJECTION INTRAMUSCULAR; INTRAVENOUS; SUBCUTANEOUS at 19:16

## 2022-03-07 RX ADMIN — Medication 400 MILLIGRAM(S): at 12:49

## 2022-03-07 RX ADMIN — Medication 250 MILLIGRAM(S): at 13:33

## 2022-03-07 RX ADMIN — Medication 1000 MILLIGRAM(S): at 12:52

## 2022-03-07 RX ADMIN — CEFEPIME 100 MILLIGRAM(S): 1 INJECTION, POWDER, FOR SOLUTION INTRAMUSCULAR; INTRAVENOUS at 21:13

## 2022-03-07 RX ADMIN — CEFEPIME 100 MILLIGRAM(S): 1 INJECTION, POWDER, FOR SOLUTION INTRAMUSCULAR; INTRAVENOUS at 15:17

## 2022-03-07 RX ADMIN — FENTANYL CITRATE 50 MICROGRAM(S): 50 INJECTION INTRAVENOUS at 13:48

## 2022-03-07 RX ADMIN — SODIUM CHLORIDE 2300 MILLILITER(S): 9 INJECTION, SOLUTION INTRAVENOUS at 13:26

## 2022-03-07 RX ADMIN — FENTANYL CITRATE 25 MICROGRAM(S): 50 INJECTION INTRAVENOUS at 12:46

## 2022-03-07 RX ADMIN — HYDROMORPHONE HYDROCHLORIDE 1 MILLIGRAM(S): 2 INJECTION INTRAMUSCULAR; INTRAVENOUS; SUBCUTANEOUS at 17:20

## 2022-03-07 RX ADMIN — Medication 1000 MILLIGRAM(S): at 13:02

## 2022-03-07 RX ADMIN — HYDROMORPHONE HYDROCHLORIDE 1 MILLIGRAM(S): 2 INJECTION INTRAMUSCULAR; INTRAVENOUS; SUBCUTANEOUS at 21:45

## 2022-03-07 RX ADMIN — HYDROMORPHONE HYDROCHLORIDE 2 MILLIGRAM(S): 2 INJECTION INTRAMUSCULAR; INTRAVENOUS; SUBCUTANEOUS at 19:21

## 2022-03-07 RX ADMIN — ONDANSETRON 4 MILLIGRAM(S): 8 TABLET, FILM COATED ORAL at 12:49

## 2022-03-07 RX ADMIN — HYDROMORPHONE HYDROCHLORIDE 2 MILLIGRAM(S): 2 INJECTION INTRAMUSCULAR; INTRAVENOUS; SUBCUTANEOUS at 23:42

## 2022-03-07 RX ADMIN — DEXTROSE MONOHYDRATE, SODIUM CHLORIDE, AND POTASSIUM CHLORIDE 75 MILLILITER(S): 50; .745; 4.5 INJECTION, SOLUTION INTRAVENOUS at 20:39

## 2022-03-07 RX ADMIN — Medication 100 GRAM(S): at 15:29

## 2022-03-07 RX ADMIN — FENTANYL CITRATE 25 MICROGRAM(S): 50 INJECTION INTRAVENOUS at 13:02

## 2022-03-07 RX ADMIN — SODIUM CHLORIDE 2300 MILLILITER(S): 9 INJECTION, SOLUTION INTRAVENOUS at 15:41

## 2022-03-07 RX ADMIN — Medication 100 MILLIEQUIVALENT(S): at 15:30

## 2022-03-07 RX ADMIN — Medication 480 MICROGRAM(S): at 17:19

## 2022-03-07 RX ADMIN — ONDANSETRON 4 MILLIGRAM(S): 8 TABLET, FILM COATED ORAL at 23:43

## 2022-03-07 RX ADMIN — HYDROMORPHONE HYDROCHLORIDE 1 MILLIGRAM(S): 2 INJECTION INTRAMUSCULAR; INTRAVENOUS; SUBCUTANEOUS at 15:28

## 2022-03-07 RX ADMIN — FENTANYL CITRATE 50 MICROGRAM(S): 50 INJECTION INTRAVENOUS at 13:33

## 2022-03-07 RX ADMIN — Medication 1 GRAM(S): at 17:13

## 2022-03-07 RX ADMIN — HYDROMORPHONE HYDROCHLORIDE 1 MILLIGRAM(S): 2 INJECTION INTRAMUSCULAR; INTRAVENOUS; SUBCUTANEOUS at 17:13

## 2022-03-07 NOTE — H&P ADULT - NSHPREVIEWOFSYSTEMS_GEN_ALL_CORE
REVIEW OF SYSTEMS  General: denies weakness, malaise  Skin/Breast: no new rash  Ophthalmologic: no change in vision  ENMT: no dysphagia, throat pain or change in hearing  Respiratory and Thorax: no difficulty breathing or chest pain  Cardiovascular: no palpitations or PND, orthopnea  Gastrointestinal: no abdominal pain, normal bowel movement, no dark stools  Genitourinary: no difficulty urinating, no burning urination  Musculoskeletal: no myalgia/arthrlagia  Neurological: no weakness, numbness, change in gait  Psychiatric: no depression, anxiety  Hematology/Lymphatics: denies easy bruising or bleeding  Endocrine: no polyuria, polydipsia REVIEW OF SYSTEMS  General: denies weakness, malaise; +fevers  Skin/Breast: no new rash  Ophthalmologic: no change in vision  ENMT: no dysphagia, throat pain or change in hearing  Respiratory and Thorax: no difficulty breathing or chest pain  Cardiovascular: no palpitations or PND, orthopnea  Gastrointestinal: +abdominal pain, +diarrhea; no dark stools  Genitourinary: no difficulty urinating, no burning urination  Musculoskeletal: no myalgia/arthrlagia  Neurological: no weakness, numbness, change in gait  Psychiatric: no depression, anxiety  Hematology/Lymphatics: denies easy bruising or bleeding  Endocrine: no polyuria, polydipsia

## 2022-03-07 NOTE — H&P ADULT - NSHPPHYSICALEXAM_GEN_ALL_CORE
PHYSICAL EXAM:    General: in no acute distress  Eyes: PERRLA, EOMI; conjunctiva and sclera clear  Head: Normocephalic; atraumatic  ENMT: No nasal discharge; airway clear  Neck: Supple; non tender; no masses  Respiratory: No wheezes, rales or rhonchi  Cardiovascular: Regular rate and rhythm. S1 and S2 Normal; No murmurs, gallops or rubs  Gastrointestinal: Soft non-tender non-distended; Normal bowel sounds  Genitourinary: No costovertebral angle tenderness  Extremities: Normal range of motion, No clubbing, cyanosis or edema  Vascular: Peripheral pulses palpable 2+ bilaterally  Neurological: Alert and oriented x4  Skin: Warm and dry. No acute rash  Lymph Nodes: No acute cervical adenopathy  Musculoskeletal: Normal gait, tone, without deformities  Psychiatric: Cooperative and appropriate PHYSICAL EXAM:  General: in no acute distress; pallor noted  Eyes: PERRLA, EOMI; conjunctiva pallor ; non-icteric sclera  Head: Normocephalic; atraumatic  ENMT: No nasal discharge; airway clear  Neck: Supple; non tender; no masses  Respiratory: No wheezes, rales or rhonchi  Cardiovascular: Regular rate and rhythm. S1 and S2 Normal; No murmurs, gallops or rubs  Gastrointestinal: soft, diffusely tender non-distended; +BS  Genitourinary: No costovertebral angle tenderness  Extremities: Normal range of motion, No clubbing, cyanosis or edema  Vascular: Peripheral pulses palpable 2+ bilaterally  Neurological: Alert and oriented x4  Skin: Warm and dry. No acute rash  Psychiatric: Cooperative and appropriate

## 2022-03-07 NOTE — ED PROVIDER NOTE - CLINICAL SUMMARY MEDICAL DECISION MAKING FREE TEXT BOX
35yo female with pmh of breast cancer with mets to bone and liver on chemo last chemo was last week presents with fever, weakness and abd pain. Pt with neutopenic fever, given abx, also severe abd pain, given pain meds, onc following, will admit for further management

## 2022-03-07 NOTE — ED PROVIDER NOTE - OBJECTIVE STATEMENT
ANC 0.4  nausea and abdominal   neuopogen 480mcg subq   [phone] 37yo female with pmh of breast cancer with mets to bone and liver on chemo last chemo was last week presents with fever, weakness and abd pain. Pt for the past day with generalized weakness abd pain, nausea/vomiting, went to see oncologist (Dr. Schaeffer [phone]) and was found to have an ANC 0.4 and febrile sent in for evaluation. Pt states she's had nbnb emesis for the past day. Pt states the abd pain is not the same as her liver mets pain it's worse for the past day. Pt denies ha, loc, focal neuro deficits, cp/sob/palp, cough, diarrhea, urinary symptoms, recent travel.

## 2022-03-07 NOTE — H&P ADULT - ASSESSMENT
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 Santana), Anxiety who comes with abdominal pain x1 week, neutropenia and fever from [facility] office. Patient reports nausea and abdominal pain generalized as well as diarrhea for the past 1 week. Patient thought maybe from chemo (which she will sometimes have) however this extended past the typical time. Patient went to heme/onc office and was sent to the ED after found to be in neutropenic fever. In ED with enterovirus and colitis.    #neutropenic fever  - admit to any bed  - at time of admit no fever, differential is pending  - enterovirus likely cause  - will cover with IV antibiotics - cefepime 2g q8  - consult ID  - consult [facility]  - check c-diff, check GI PCR  - IV fluids with D5 1/2 NS and 20 of K  - zofran PRN for nausea    #previous hx of pericardial effusion from past hospitalization  - continue daily colchicine    #metastatic r breast ca with met to spine and liver (stage IV)  - on taxifer and herceptin as outpatient every 21 days - last dose on 2/28  - continue pain regimen - IV dilaudid  - can restart morphine ER when tolerating diet    DVT ppx - lovenox 40mg sq daily

## 2022-03-07 NOTE — H&P ADULT - HISTORY OF PRESENT ILLNESS
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 Santana), Anxiety who comes with abdominal pain x1 week, neutropenia and fever from [facility] office. Patient reports nausea and abodminal pain generalized as well as diarrhea for the past 1 week. Patient thought maybe from chemo (which she will sometimes have) however this extended past the typical time. Patient went to heme/onc office and was sent to the ED after found to be in neutropenic fever. Patient reports 'low grade' fevers at home, no chills. Denies cough, SOB, chest pain. Denies leg pain, swelling, erythema. Denies sick contacts.    In the ED found to have enterovirus and CT abdomen with enterocolitis. Medicine called to admit.

## 2022-03-07 NOTE — ED ADULT NURSE REASSESSMENT NOTE - NS ED NURSE REASSESS COMMENT FT1
Pt tolerating IV meds, pt supplemented with potassium and magnesium, pt states pain medication is helping. VS as charted. Safety precautions maintained. Hourly rounding, call bell in reach, bed locked and in lowest position.

## 2022-03-07 NOTE — ED ADULT NURSE REASSESSMENT NOTE - NS ED NURSE REASSESS COMMENT FT1
Pt tolerating IV meds, VS as charted. Pt still complaining of nausea. NSR on tele. Safety precautions maintained. Hourly rounding, call bell in reach, bed locked and in lowest position.

## 2022-03-07 NOTE — ED PROVIDER NOTE - PHYSICAL EXAMINATION
Const: Awake, alert and oriented. In no acute distress. Well appearing.  HEENT: NC/AT. Moist mucous membranes.  Eyes: No scleral icterus. EOMI.  Neck:. Soft and supple. Full ROM without pain.  Cardiac: Tachy rate and regular rhythm. +S1/S2. Peripheral pulses 2+ and symmetric. No LE edema.  Resp: Speaking in full sentences. No evidence of respiratory distress. No wheezes, rales or rhonchi.  Abd: Soft, ttp diffusely, non-distended. Normal bowel sounds in all 4 quadrants. No guarding or rebound.  Back: Spine midline and non-tender. No CVAT.  Skin: No rashes, abrasions or lacerations.  Lymph: No cervical lymphadenopathy.  Neuro: Awake, alert & oriented x 3. Moves all extremities symmetrically.

## 2022-03-07 NOTE — ED ADULT NURSE NOTE - NSIMPLEMENTINTERV_GEN_ALL_ED
Implemented All Universal Safety Interventions:  [place] to call system. Call bell, personal items and telephone within reach. Instruct patient to call for assistance. Room bathroom lighting operational. Non-slip footwear when patient is off stretcher. Physically safe environment: no spills, clutter or unnecessary equipment. Stretcher in lowest position, wheels locked, appropriate side rails in place.

## 2022-03-08 LAB
ALBUMIN SERPL ELPH-MCNC: 3 G/DL — LOW (ref 3.3–5.2)
ALP SERPL-CCNC: 78 U/L — SIGNIFICANT CHANGE UP (ref 40–120)
ALT FLD-CCNC: 63 U/L — HIGH
ANION GAP SERPL CALC-SCNC: 13 MMOL/L — SIGNIFICANT CHANGE UP (ref 5–17)
ANION GAP SERPL CALC-SCNC: 14 MMOL/L — SIGNIFICANT CHANGE UP (ref 5–17)
ANION GAP SERPL CALC-SCNC: 15 MMOL/L — SIGNIFICANT CHANGE UP (ref 5–17)
ANISOCYTOSIS BLD QL: SLIGHT — SIGNIFICANT CHANGE UP
AST SERPL-CCNC: 10 U/L — SIGNIFICANT CHANGE UP
BASOPHILS # BLD AUTO: 0.05 K/UL — SIGNIFICANT CHANGE UP (ref 0–0.2)
BASOPHILS NFR BLD AUTO: 2 % — SIGNIFICANT CHANGE UP (ref 0–2)
BILIRUB SERPL-MCNC: 0.9 MG/DL — SIGNIFICANT CHANGE UP (ref 0.4–2)
BLASTS # FLD: 0.4 % — HIGH (ref 0–0)
BLD GP AB SCN SERPL QL: SIGNIFICANT CHANGE UP
BUN SERPL-MCNC: 6.5 MG/DL — LOW (ref 8–20)
BUN SERPL-MCNC: 7.5 MG/DL — LOW (ref 8–20)
BUN SERPL-MCNC: 9.7 MG/DL — SIGNIFICANT CHANGE UP (ref 8–20)
BURR CELLS BLD QL SMEAR: PRESENT — SIGNIFICANT CHANGE UP
C DIFF BY PCR RESULT: SIGNIFICANT CHANGE UP
C DIFF TOX GENS STL QL NAA+PROBE: SIGNIFICANT CHANGE UP
CALCIUM SERPL-MCNC: 5.9 MG/DL — CRITICAL LOW (ref 8.6–10.2)
CALCIUM SERPL-MCNC: 6.1 MG/DL — CRITICAL LOW (ref 8.6–10.2)
CALCIUM SERPL-MCNC: 6.3 MG/DL — CRITICAL LOW (ref 8.6–10.2)
CHLORIDE SERPL-SCNC: 102 MMOL/L — SIGNIFICANT CHANGE UP (ref 98–107)
CHLORIDE SERPL-SCNC: 103 MMOL/L — SIGNIFICANT CHANGE UP (ref 98–107)
CHLORIDE SERPL-SCNC: 104 MMOL/L — SIGNIFICANT CHANGE UP (ref 98–107)
CO2 SERPL-SCNC: 19 MMOL/L — LOW (ref 22–29)
CREAT SERPL-MCNC: 0.38 MG/DL — LOW (ref 0.5–1.3)
CREAT SERPL-MCNC: 0.43 MG/DL — LOW (ref 0.5–1.3)
CREAT SERPL-MCNC: 0.47 MG/DL — LOW (ref 0.5–1.3)
CULTURE RESULTS: SIGNIFICANT CHANGE UP
CULTURE RESULTS: SIGNIFICANT CHANGE UP
DACRYOCYTES BLD QL SMEAR: SLIGHT — SIGNIFICANT CHANGE UP
EGFR: 127 ML/MIN/1.73M2 — SIGNIFICANT CHANGE UP
EGFR: 130 ML/MIN/1.73M2 — SIGNIFICANT CHANGE UP
EGFR: 134 ML/MIN/1.73M2 — SIGNIFICANT CHANGE UP
ELLIPTOCYTES BLD QL SMEAR: SLIGHT — SIGNIFICANT CHANGE UP
EOSINOPHIL # BLD AUTO: 0.04 K/UL — SIGNIFICANT CHANGE UP (ref 0–0.5)
EOSINOPHIL NFR BLD AUTO: 1.6 % — SIGNIFICANT CHANGE UP (ref 0–6)
GIANT PLATELETS BLD QL SMEAR: PRESENT — SIGNIFICANT CHANGE UP
GLUCOSE SERPL-MCNC: 105 MG/DL — HIGH (ref 70–99)
GLUCOSE SERPL-MCNC: 98 MG/DL — SIGNIFICANT CHANGE UP (ref 70–99)
GLUCOSE SERPL-MCNC: 99 MG/DL — SIGNIFICANT CHANGE UP (ref 70–99)
HCT VFR BLD CALC: 26.4 % — LOW (ref 34.5–45)
HCT VFR BLD CALC: 28 % — LOW (ref 34.5–45)
HGB BLD-MCNC: 8.8 G/DL — LOW (ref 11.5–15.5)
HGB BLD-MCNC: 9.1 G/DL — LOW (ref 11.5–15.5)
LYMPHOCYTES # BLD AUTO: 0.28 K/UL — LOW (ref 1–3.3)
LYMPHOCYTES # BLD AUTO: 11.7 % — LOW (ref 13–44)
LYMPHOCYTES # SPEC AUTO: 0.4 % — HIGH (ref 0–0)
MACROCYTES BLD QL: SLIGHT — SIGNIFICANT CHANGE UP
MAGNESIUM SERPL-MCNC: 1.9 MG/DL — SIGNIFICANT CHANGE UP (ref 1.6–2.6)
MANUAL SMEAR VERIFICATION: SIGNIFICANT CHANGE UP
MCHC RBC-ENTMCNC: 27.2 PG — SIGNIFICANT CHANGE UP (ref 27–34)
MCHC RBC-ENTMCNC: 27.2 PG — SIGNIFICANT CHANGE UP (ref 27–34)
MCHC RBC-ENTMCNC: 32.5 GM/DL — SIGNIFICANT CHANGE UP (ref 32–36)
MCHC RBC-ENTMCNC: 33.3 GM/DL — SIGNIFICANT CHANGE UP (ref 32–36)
MCV RBC AUTO: 81.7 FL — SIGNIFICANT CHANGE UP (ref 80–100)
MCV RBC AUTO: 83.8 FL — SIGNIFICANT CHANGE UP (ref 80–100)
METAMYELOCYTES # FLD: 1.2 % — HIGH (ref 0–0)
MICROCYTES BLD QL: SLIGHT — SIGNIFICANT CHANGE UP
MONOCYTES # BLD AUTO: 0.87 K/UL — SIGNIFICANT CHANGE UP (ref 0–0.9)
MONOCYTES NFR BLD AUTO: 36.3 % — HIGH (ref 2–14)
MYELOCYTES NFR BLD: 2 % — HIGH (ref 0–0)
NEUTROPHILS # BLD AUTO: 0.94 K/UL — LOW (ref 1.8–7.4)
NEUTROPHILS NFR BLD AUTO: 26.6 % — LOW (ref 43–77)
NEUTS BAND # BLD: 12.9 % — HIGH (ref 0–8)
NRBC # BLD: 4 /100 — HIGH (ref 0–0)
PLAT MORPH BLD: NORMAL — SIGNIFICANT CHANGE UP
PLATELET # BLD AUTO: 167 K/UL — SIGNIFICANT CHANGE UP (ref 150–400)
PLATELET # BLD AUTO: 183 K/UL — SIGNIFICANT CHANGE UP (ref 150–400)
POIKILOCYTOSIS BLD QL AUTO: SLIGHT — SIGNIFICANT CHANGE UP
POLYCHROMASIA BLD QL SMEAR: SLIGHT — SIGNIFICANT CHANGE UP
POTASSIUM SERPL-MCNC: 3 MMOL/L — LOW (ref 3.5–5.3)
POTASSIUM SERPL-MCNC: 3.1 MMOL/L — LOW (ref 3.5–5.3)
POTASSIUM SERPL-MCNC: 3.2 MMOL/L — LOW (ref 3.5–5.3)
POTASSIUM SERPL-SCNC: 3 MMOL/L — LOW (ref 3.5–5.3)
POTASSIUM SERPL-SCNC: 3.1 MMOL/L — LOW (ref 3.5–5.3)
POTASSIUM SERPL-SCNC: 3.2 MMOL/L — LOW (ref 3.5–5.3)
PROMYELOCYTES # FLD: 2.9 % — HIGH (ref 0–0)
PROT SERPL-MCNC: 5.7 G/DL — LOW (ref 6.6–8.7)
RBC # BLD: 3.23 M/UL — LOW (ref 3.8–5.2)
RBC # BLD: 3.34 M/UL — LOW (ref 3.8–5.2)
RBC # FLD: 17.6 % — HIGH (ref 10.3–14.5)
RBC # FLD: 17.7 % — HIGH (ref 10.3–14.5)
RBC BLD AUTO: ABNORMAL
SCHISTOCYTES BLD QL AUTO: SLIGHT — SIGNIFICANT CHANGE UP
SODIUM SERPL-SCNC: 135 MMOL/L — SIGNIFICANT CHANGE UP (ref 135–145)
SODIUM SERPL-SCNC: 136 MMOL/L — SIGNIFICANT CHANGE UP (ref 135–145)
SODIUM SERPL-SCNC: 137 MMOL/L — SIGNIFICANT CHANGE UP (ref 135–145)
SPECIMEN SOURCE: SIGNIFICANT CHANGE UP
SPECIMEN SOURCE: SIGNIFICANT CHANGE UP
VARIANT LYMPHS # BLD: 2 % — SIGNIFICANT CHANGE UP (ref 0–6)
WBC # BLD: 2.39 K/UL — LOW (ref 3.8–10.5)
WBC # BLD: 3.18 K/UL — LOW (ref 3.8–10.5)
WBC # FLD AUTO: 2.39 K/UL — LOW (ref 3.8–10.5)
WBC # FLD AUTO: 3.18 K/UL — LOW (ref 3.8–10.5)

## 2022-03-08 PROCEDURE — 99233 SBSQ HOSP IP/OBS HIGH 50: CPT

## 2022-03-08 PROCEDURE — 99222 1ST HOSP IP/OBS MODERATE 55: CPT

## 2022-03-08 RX ORDER — LANOLIN ALCOHOL/MO/W.PET/CERES
3 CREAM (GRAM) TOPICAL ONCE
Refills: 0 | Status: COMPLETED | OUTPATIENT
Start: 2022-03-08 | End: 2022-03-08

## 2022-03-08 RX ORDER — POTASSIUM CHLORIDE 20 MEQ
40 PACKET (EA) ORAL ONCE
Refills: 0 | Status: COMPLETED | OUTPATIENT
Start: 2022-03-08 | End: 2022-03-08

## 2022-03-08 RX ORDER — METOCLOPRAMIDE HCL 10 MG
10 TABLET ORAL ONCE
Refills: 0 | Status: COMPLETED | OUTPATIENT
Start: 2022-03-08 | End: 2022-03-08

## 2022-03-08 RX ORDER — POTASSIUM CHLORIDE 20 MEQ
40 PACKET (EA) ORAL EVERY 4 HOURS
Refills: 0 | Status: COMPLETED | OUTPATIENT
Start: 2022-03-08 | End: 2022-03-08

## 2022-03-08 RX ORDER — POTASSIUM CHLORIDE 20 MEQ
10 PACKET (EA) ORAL
Refills: 0 | Status: COMPLETED | OUTPATIENT
Start: 2022-03-08 | End: 2022-03-09

## 2022-03-08 RX ADMIN — Medication 40 MILLIEQUIVALENT(S): at 14:10

## 2022-03-08 RX ADMIN — Medication 40 MILLIEQUIVALENT(S): at 08:30

## 2022-03-08 RX ADMIN — HYDROMORPHONE HYDROCHLORIDE 2 MILLIGRAM(S): 2 INJECTION INTRAMUSCULAR; INTRAVENOUS; SUBCUTANEOUS at 04:45

## 2022-03-08 RX ADMIN — HYDROMORPHONE HYDROCHLORIDE 2 MILLIGRAM(S): 2 INJECTION INTRAMUSCULAR; INTRAVENOUS; SUBCUTANEOUS at 00:30

## 2022-03-08 RX ADMIN — CEFEPIME 100 MILLIGRAM(S): 1 INJECTION, POWDER, FOR SOLUTION INTRAMUSCULAR; INTRAVENOUS at 22:10

## 2022-03-08 RX ADMIN — HYDROMORPHONE HYDROCHLORIDE 2 MILLIGRAM(S): 2 INJECTION INTRAMUSCULAR; INTRAVENOUS; SUBCUTANEOUS at 08:30

## 2022-03-08 RX ADMIN — ESCITALOPRAM OXALATE 5 MILLIGRAM(S): 10 TABLET, FILM COATED ORAL at 11:12

## 2022-03-08 RX ADMIN — HYDROMORPHONE HYDROCHLORIDE 2 MILLIGRAM(S): 2 INJECTION INTRAMUSCULAR; INTRAVENOUS; SUBCUTANEOUS at 14:11

## 2022-03-08 RX ADMIN — HYDROMORPHONE HYDROCHLORIDE 2 MILLIGRAM(S): 2 INJECTION INTRAMUSCULAR; INTRAVENOUS; SUBCUTANEOUS at 16:30

## 2022-03-08 RX ADMIN — ONDANSETRON 4 MILLIGRAM(S): 8 TABLET, FILM COATED ORAL at 18:48

## 2022-03-08 RX ADMIN — HYDROMORPHONE HYDROCHLORIDE 2 MILLIGRAM(S): 2 INJECTION INTRAMUSCULAR; INTRAVENOUS; SUBCUTANEOUS at 23:17

## 2022-03-08 RX ADMIN — HYDROMORPHONE HYDROCHLORIDE 2 MILLIGRAM(S): 2 INJECTION INTRAMUSCULAR; INTRAVENOUS; SUBCUTANEOUS at 18:42

## 2022-03-08 RX ADMIN — Medication 10 MILLIGRAM(S): at 21:18

## 2022-03-08 RX ADMIN — ONDANSETRON 4 MILLIGRAM(S): 8 TABLET, FILM COATED ORAL at 11:11

## 2022-03-08 RX ADMIN — Medication 3 MILLIGRAM(S): at 03:17

## 2022-03-08 RX ADMIN — HYDROMORPHONE HYDROCHLORIDE 2 MILLIGRAM(S): 2 INJECTION INTRAMUSCULAR; INTRAVENOUS; SUBCUTANEOUS at 22:10

## 2022-03-08 RX ADMIN — CEFEPIME 100 MILLIGRAM(S): 1 INJECTION, POWDER, FOR SOLUTION INTRAMUSCULAR; INTRAVENOUS at 14:10

## 2022-03-08 RX ADMIN — Medication 0.5 MILLIGRAM(S): at 21:20

## 2022-03-08 RX ADMIN — HYDROMORPHONE HYDROCHLORIDE 2 MILLIGRAM(S): 2 INJECTION INTRAMUSCULAR; INTRAVENOUS; SUBCUTANEOUS at 09:00

## 2022-03-08 RX ADMIN — HYDROMORPHONE HYDROCHLORIDE 2 MILLIGRAM(S): 2 INJECTION INTRAMUSCULAR; INTRAVENOUS; SUBCUTANEOUS at 19:53

## 2022-03-08 RX ADMIN — Medication 100 MILLIEQUIVALENT(S): at 22:41

## 2022-03-08 RX ADMIN — Medication 0.6 MILLIGRAM(S): at 18:42

## 2022-03-08 RX ADMIN — HYDROMORPHONE HYDROCHLORIDE 2 MILLIGRAM(S): 2 INJECTION INTRAMUSCULAR; INTRAVENOUS; SUBCUTANEOUS at 03:45

## 2022-03-08 RX ADMIN — CEFEPIME 100 MILLIGRAM(S): 1 INJECTION, POWDER, FOR SOLUTION INTRAMUSCULAR; INTRAVENOUS at 05:29

## 2022-03-08 RX ADMIN — Medication 0.6 MILLIGRAM(S): at 05:29

## 2022-03-08 RX ADMIN — PANTOPRAZOLE SODIUM 40 MILLIGRAM(S): 20 TABLET, DELAYED RELEASE ORAL at 11:10

## 2022-03-08 RX ADMIN — Medication 0.5 MILLIGRAM(S): at 01:24

## 2022-03-08 RX ADMIN — Medication 100 MILLIEQUIVALENT(S): at 23:42

## 2022-03-08 RX ADMIN — HYDROMORPHONE HYDROCHLORIDE 2 MILLIGRAM(S): 2 INJECTION INTRAMUSCULAR; INTRAVENOUS; SUBCUTANEOUS at 11:10

## 2022-03-08 NOTE — PROGRESS NOTE ADULT - ASSESSMENT
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 Santana), Anxiety who comes with abdominal pain x1 week, neutropenia and fever from [facility] office. Patient reports nausea and abdominal pain generalized as well as diarrhea for the past 1 week. Patient thought maybe from chemo (which she will sometimes have) however this extended past the typical time. Patient went to heme/onc office and was sent to the ED after found to be in neutropenic fever. In ED with enterovirus and colitis.    Neutropenic Fever  - likely caused by Enterovirus and colitis  - Cefepime 2g q8h  - ID consulted  - C-diff negative   - IV fluids with D5 1/2 NS and 20 of K  - Zofran PRN for nausea    Hypokalemia  - 3.0  - Repleted  - Monitor BMP    Multifactorial Anemia  - 11 to 8.8  - Likely dilutional, chemotherapy and malignancy  - No active bleeding  - Monitor CBC    Previous hx of pericardial effusion from past hospitalization  - Colchicine 0.6mg q12h    Metastatic right breast ca with met to spine and liver (stage IV)  - on taxifer and herceptin as outpatient every 21 days - last dose on 2/28  - continue pain regimen - IV dilaudid  - can restart morphine ER when tolerating diet    DVT ppx  - SCD

## 2022-03-08 NOTE — CHART NOTE - NSCHARTNOTEFT_GEN_A_CORE
Medicine PA-  Pt c/o nausea (no vomiting) zofran didn't help and wants to try reglan. Today pt was hypokalemic, repleted with PO K40meq x2 doses and BMP repeated.    03-08    137  |  104  |  6.5<L>  ----------------------------<  98  3.1<L>   |  19.0<L>  |  0.47<L>    >Nausea, Hypokalemia  -reglan 10mg IVP x1  -K-10meq IVPB x3   -repeat BMP  -call PA if nausea persists

## 2022-03-08 NOTE — CONSULT NOTE ADULT - SUBJECTIVE AND OBJECTIVE BOX
HPI: Patient is a 36y Female seen on consultation for the evaluation and management of Metastatic breast cancer to spine, liver, with malignant pleural effusion, pathologic compression fractures anxiety.  Receiving chemotherapy through [facility]; receiving Taxotere, Herceptin; last received last week.  Has been requiring IV hydration in office.  Presented yesterday to office with increasing abd pain, c/o diarrhea, low grade fever, low WBC.  Sent to ER.  Given Hydration, Cefapime.  Feeling much better.  Abd pain improved; no current c/o nausea, vomiting or of diarrhea.  No c/o bleeding.  Denies SOB or chest pain, headaches or dizziness.     PAST MEDICAL-metastatic, on chemotherapy    H/O compression fracture of spine    Anxiety    S/P tonsillectomy        REVIEW OF SYSTEMS  Admitted with c/o nausea, abd pain, diarrhea, low grade fever  No c/o headaches or dizziness  No SOB, chest pain or palpitations  No cough or hemoptysis dysuria or hematruia        MEDICATIONS  (STANDING):  cefepime   IVPB 2000 milliGRAM(s) IV Intermittent every 8 hours  colchicine 0.6 milliGRAM(s) Oral every 12 hours  dextrose 5% + sodium chloride 0.9% with potassium chloride 20 mEq/L 1000 milliLiter(s) (75 mL/Hr) IV Continuous <Continuous>  escitalopram 5 milliGRAM(s) Oral daily  pantoprazole  Injectable 40 milliGRAM(s) IV Push daily  potassium chloride    Tablet ER 40 milliEquivalent(s) Oral every 4 hours    MEDICATIONS  (PRN):  ALPRAZolam 0.5 milliGRAM(s) Oral every 8 hours PRN axniety  HYDROmorphone  Injectable 2 milliGRAM(s) IV Push every 4 hours PRN Moderate Pain (4 - 6)  HYDROmorphone  Injectable 2 milliGRAM(s) IV Push every 4 hours PRN Severe Pain (7 - 10)  ondansetron Injectable 4 milliGRAM(s) IV Push every 8 hours PRN Nausea and/or Vomiting      Allergies    Perjeta (Other (Severe))  pertuzumab (Other (Severe))    Intolerances        SOCIAL HISTORY:      Occupation: RN    FAMILY HISTORY:  FH: CVA (cerebrovascular accident)            Vital Signs Last 24 Hrs  T(C): 37.1 (08 Mar 2022 04:01), Max: 37.6 (07 Mar 2022 12:42)  T(F): 98.8 (08 Mar 2022 04:01), Max: 99.6 (07 Mar 2022 12:42)  HR: 96 (08 Mar 2022 04:01) (82 - 125)  BP: 102/67 (08 Mar 2022 04:01) (98/71 - 120/80)  BP(mean): --  RR: 18 (08 Mar 2022 04:01) (18 - 20)  SpO2: 93% (08 Mar 2022 04:01) (93% - 98%)    PHYSICAL EXAM:    WDWN, awake, alert, comfortable  Neck:  No adenopathy  Port intact  Lungs clear  Cor:  RRR normal S1S2  Abd:  Soft, non-tender, pos BS  Extrem without edema            LABS:                        8.8    2.39  )-----------( 183      ( 08 Mar 2022 06:59 )             26.4     03-08    136  |  102  |  9.7  ----------------------------<  105<H>  3.0<L>   |  19.0<L>  |  0.43<L>    Ca    6.3<LL>      08 Mar 2022 06:59  Phos  2.7     03-07  Mg     1.9     03-08    TPro  5.7<L>  /  Alb  3.0<L>  /  TBili  0.9  /  DBili  x   /  AST  10  /  ALT  63<H>  /  AlkPhos  78  03-08    PT/INR - ( 07 Mar 2022 12:33 )   PT: 19.3 sec;   INR: 1.66 ratio         PTT - ( 07 Mar 2022 12:33 )  PTT:33.3 sec  Urinalysis Basic - ( 07 Mar 2022 15:07 )    Color: Yellow / Appearance: Clear / S.015 / pH: x  Gluc: x / Ketone: Small  / Bili: Negative / Urobili: Negative mg/dL   Blood: x / Protein: 15 / Nitrite: Negative   Leuk Esterase: Trace / RBC: Negative /HPF / WBC 3-5 /HPF   Sq Epi: x / Non Sq Epi: Moderate / Bacteria: Many        RADIOLOGY & ADDITIONAL STUDIES:
                                           [facility]                                                INFECTIOUS DISEASES  =======================================================                               Salazar Arango MD#  Que Lima MD*                                     Reagan Ulloa MD*    Valorie Dale MD*            Diplomates American Board of Internal Medicine & Infectious Diseases                  # [place] Office - Appt - Tel  [phone] Fax [phone]                * [place] Office - Appt - Tel [phone] Fax [phone]                                  Hospital Consult line:  [phone]  =======================================================      [id number]  NATIVIDAD MYERS   HPI:  This 36 year old female with  Metastatic Right Breast CA (Infiltrating Ductal Carcinoma with mets to the Spine and Liver), Malignant Pleural Effusion on Home O2 (3-5 liters), Pathologic Compression Fractures (Follows with Dr. Santana), Anxiety who comes with abdominal pain x1 week, neutropenia and fever from [facility] office. Patient reports nausea and abdominal pain generalized as well as diarrhea for the past 1 week. Patient thought maybe from chemo (which she will sometimes have) however this extended past the typical time. Patient went to heme/onc office and was sent to the ED after found to be in neutropenic fever. Patient reports 'low grade' fevers at home, no chills. Denies cough, SOB, chest pain. Denies leg pain, swelling, erythema. Denies sick contacts.  In the ED found to have enterovirus and CT abdomen with enterocolitis. Medicine called to admit. (07 Mar 2022 17:43)    patient noted with fevers at home, but none here. cultures were sent by ER/ Admitting teams.     I have personally reviewed the labs and data; pertinent labs and data are listed in this note; please see below.   =======================================================  Past Medical & Surgical Hx:  =====================  PAST MEDICAL & SURGICAL HISTORY:  Breast CA    H/O compression fracture of spine    Anxiety    S/P tonsillectomy      Problem List:  ==========  HEALTH ISSUES - PROBLEM Dx:        Social Hx:  =======  no toxic habits currently    FAMILY HISTORY:  FH: CVA (cerebrovascular accident)    no significant family history of immunosuppressive disorders in mother or father   =======================================================    REVIEW OF SYSTEMS:  CONSTITUTIONAL:   FEVERS at home.   HEENT:  No diplopia or blurred vision.  No earache, sore throat or runny nose.  CARDIOVASCULAR:  No pressure, squeezing, strangling, tightness, heaviness or aching about the chest, neck, axilla or epigastrium.  RESPIRATORY:  No cough, shortness of breath  GASTROINTESTINAL:  No nausea, vomiting or diarrhea.  GENITOURINARY:  No dysuria, frequency or urgency. No Blood in urine  MUSCULOSKELETAL:  no joint aches, no muscle pain  SKIN:  No change in skin, hair or nails.  NEUROLOGIC:  No Headaches, seizures or weakness.  PSYCHIATRIC:  No disorder of thought or mood.  ENDOCRINE:  No heat or cold intolerance  HEMATOLOGICAL:  No easy bruising or bleeding.    =======================================================  Allergies  Perjeta (Other (Severe))  pertuzumab (Other (Severe))    Antibiotics:  cefepime   IVPB 2000 milliGRAM(s) IV Intermittent every 8 hours    Other medications:  colchicine 0.6 milliGRAM(s) Oral every 12 hours  dextrose 5% + sodium chloride 0.9% with potassium chloride 20 mEq/L 1000 milliLiter(s) IV Continuous <Continuous>  escitalopram 5 milliGRAM(s) Oral daily  pantoprazole  Injectable 40 milliGRAM(s) IV Push daily  potassium chloride    Tablet ER 40 milliEquivalent(s) Oral every 4 hours   cefepime   IVPB   100 mL/Hr IV Intermittent (03-07-22 @ 21:13)   100 mL/Hr IV Intermittent (03-08-22 @ 05:29)    cefepime   IVPB   100 mL/Hr IV Intermittent (03-07-22 @ 15:17)    vancomycin  IVPB.   250 mL/Hr IV Intermittent (03-07-22 @ 13:33)      ======================================================  Physical Exam:  ============  T(F): 98.1 (08 Mar 2022 10:15), Max: 99.6 (07 Mar 2022 12:42)  HR: 95 (08 Mar 2022 10:15)  BP: 105/60 (08 Mar 2022 10:30)  RR: 18 (08 Mar 2022 10:15)  SpO2: 95% (08 Mar 2022 10:15) (93% - 98%)  temp max in last 48H T(F): , Max: 99.6 (03-07-22 @ 12:42)    General:  No acute distress.  Eye: Pupils are equal, round and reactive to light, Extraocular movements are intact, Normal conjunctiva.  HENT: Normocephalic, Oral mucosa is moist, No pharyngeal erythema, No sinus tenderness.  TONGUE with multiple raised papillae  Neck: Supple, No lymphadenopathy.  Respiratory: Lungs are clear to auscultation, Respirations are non-labored.  Cardiovascular: Normal rate, Regular rhythm,   Gastrointestinal: Soft, Non-tender, Non-distended, Normal bowel sounds.  Genitourinary: No costovertebral angle tenderness.  Lymphatics: No lymphadenopathy neck,   Musculoskeletal: Normal range of motion, Normal strength.  Integumentary: No rash.  Neurologic: Alert, Oriented, No focal deficits, Cranial Nerves II-XII are grossly intact.  Psychiatric: Appropriate mood & affect.    =======================================================  Labs:                        9.1    3.18  )-----------( 167      ( 08 Mar 2022 10:46 )             28.0     03-08    136  |  102  |  9.7  ----------------------------<  105<H>  3.0<L>   |  19.0<L>  |  0.43<L>    Ca    6.3<LL>      08 Mar 2022 06:59  Phos  2.7     03-07  Mg     1.9     03-08    TPro  5.7<L>  /  Alb  3.0<L>  /  TBili  0.9  /  DBili  x   /  AST  10  /  ALT  63<H>  /  AlkPhos  78  03-08      Creatinine, Serum: 0.43 mg/dL (03-08-22 @ 06:59)  Creatinine, Serum: 0.70 mg/dL (03-07-22 @ 12:33)         Respiratory Viral Panel with COVID-19 by [facility] (03.07.22 @ 12:31)    Rapid RVP Result: Detected    SARS-CoV-2: NotDetec: This Respiratory Panel uses polymerase chain reaction (PCR) to detect for  adenovirus; coronavirus (HKU1, NL63, 229E, OC43); human metapneumovirus  (hMPV); human enterovirus/rhinovirus (Entero/RV); influenza A; influenza  A/H1; influenza A/H3; influenza A/H1-2009; influenza B; parainfluenza  viruses 1, 2, 3, 4; respiratory syncytial virus; Mycoplasma pneumoniae;  Chlamydophila pneumoniae; and SARS-CoV-2.    Entero/Rhinovirus (RapRVP): Detected      < from: CT Abdomen and Pelvis w/ IV Cont (03.07.22 @ 16:11) >    ACC: [id number] EXAM:  CT ABDOMEN AND PELVIS IC                          PROCEDURE DATE:  03/07/2022          INTERPRETATION:  CLINICAL INFORMATION: Abdominal pain. History of   metastatic breast cancer.    COMPARISON: CT abdomen pelvis 11/3/2021.    CONTRAST/COMPLICATIONS:  IV Contrast: Omnipaque 300  95 cc administered   5 cc discarded  Oral Contrast: NONE  Complications: None reported at time of study completion    PROCEDURE:  CT of the Abdomen and Pelvis was performed.  Sagittal and coronal reformats were performed.    FINDINGS:  LOWER CHEST: Small left pleural effusion. Partially imaged central venous   catheter.    LIVER: Bilobar hypoattenuating lesions, decreased in size from prior CT   11/3/2021. For reference, left hepatic lobe lesion (3, 32) measures 1.4 x   1.1 cm, previously 2.7 x 2.4 cm.  BILE DUCTS: Normal caliber.  GALLBLADDER: Within normal limits.  SPLEEN: Within normal limits.  PANCREAS: Within normal limits.  ADRENALS: Within normal limits.  KIDNEYS/URETERS: Within normal limits.    BLADDER: Within normal limits.  REPRODUCTIVE ORGANS: Uterus and adnexa within normal limits.    BOWEL: No bowel obstruction. Appendix is normal. Diffuse colonic wall thickening and mucosal enhancement. Wall thickening and mucosal enhancement of the terminal ileum.  PERITONEUM: No ascites.  VESSELS: Within normal limits.  RETROPERITONEUM/LYMPH NODES: No lymphadenopathy.  ABDOMINAL WALL: Within normal limits.  BONES: Redemonstration of extensive osseous metastatic disease.    IMPRESSION:  Diffuse colonic wall thickening and involvement of the terminal ileum, compatible with enterocolitis.  Hepatic metastatic disease, improved from prior CT 11/3/2021.  Extensive osseous metastatic disease.  Small left pleural effusion.    --- End of Report ---      SHAWN KOROMA MD; Attending Radiologist  This document has been electronically signed. Mar  7 2022  4:40PM    < end of copied text >

## 2022-03-08 NOTE — PROGRESS NOTE ADULT - SUBJECTIVE AND OBJECTIVE BOX
Chief complaint: Fever    Patient seen and examined at bedside. No acute overnight events reported. Denies headache, fever, chills, cough, chest pain, nausea or vomiting.     Vital Signs Last 24 Hrs  T(F): 98.8 (08 Mar 2022 04:01), Max: 99.6 (07 Mar 2022 12:42)  HR: 96 (08 Mar 2022 04:01) (82 - 125)  BP: 102/67 (08 Mar 2022 04:01) (98/71 - 120/80)  RR: 18 (08 Mar 2022 04:01) (18 - 20)  SpO2: 93% (08 Mar 2022 04:01) (93% - 98%)    Physical Exam:  Constitutional: alert and oriented, in no acute distress   Neck: Soft and supple  Respiratory: Clear to auscultation bilaterally, no wheezes or crackles  Cardiovascular: Regular rate and rhyhtm, no murmurs, gallops, rubs  Gastrointestinal: Soft, non-tender to palpation, +bs  Vascular: 2+ peripheral pulses  Neurological: A/O x 3, no focal neurological deficits  Musculoskeletal: 5/5 strength b/l upper and lower extremities, no lower extremity edema bilaterally    Labs:                        8.8    2.39  )-----------( 183      ( 08 Mar 2022 06:59 )             26.4   03-08    136  |  102  |  9.7  ----------------------------<  105<H>  3.0<L>   |  19.0<L>  |  0.43<L>    Ca    6.3<LL>      08 Mar 2022 06:59  Phos  2.7     03-07  Mg     1.9     03-08    TPro  5.7<L>  /  Alb  3.0<L>  /  TBili  0.9  /  DBili  x   /  AST  10  /  ALT  63<H>  /  AlkPhos  78  03-08

## 2022-03-08 NOTE — CONSULT NOTE ADULT - ASSESSMENT
This 36 year old female with  Metastatic Right Breast CA (Infiltrating Ductal Carcinoma with mets to the Spine and Liver), Malignant Pleural Effusion on Home O2 (3-5 liters), Pathologic Compression Fractures (Follows with Dr. Santana), Anxiety who comes with abdominal pain x1 week, [facility]. Patient reports nausea and abdominal pain generalized as well as diarrhea for the past 1 week. Patient thought maybe from chemo (which she will sometimes have) however this extended past the typical time. Patient went to heme/onc office and was sent to the ED after found to be in neutropenic fever. Patient reports 'low grade' fevers at home, no chills. Denies cough, SOB, chest pain. Denies leg pain, swelling, erythema. Denies sick contacts.  In the ED found to have enterovirus and CT abdomen with enterocolitis. Medicine called to admit. (07 Mar 2022 17:43)    patient noted with fevers at home, but none here. cultures were sent by ER/ Admitting teams.       Impression:  fevers  neutropenia  breast cancer on chemotherapy.   enterocolitis  enterovirus infection  glossitis    Plan:  - fevers appear to have resolve  WBC also improving    on Cefepime.  can continue for now    if she continues to improve, w/o culture being positive, can de-escalate to oral antibiotics such as Augmentin 875mg PO BID x 7 days.     glossitis on exam  - likely from current illness.   continue supportive care.   
Imp:  37 yo female with metastatic breast cancer, receiving Taxotere and Herceptin.  Admitted with abd pain, neutropenia, low grade fevers.  Improved on IV antibiotics  WBC 2.39 with diff pending; reported toxic granulation  To receive supportive care; IV antibiotics, hydration.  Cont Neupogen until ANC 1500 back to back for 2 days  Multifactorial anemia-Hb 8, secondary to chemotherapy, malignancy; transfuse for Hb less than 7

## 2022-03-09 LAB
ALBUMIN SERPL ELPH-MCNC: 2.9 G/DL — LOW (ref 3.3–5.2)
ALP SERPL-CCNC: 69 U/L — SIGNIFICANT CHANGE UP (ref 40–120)
ALT FLD-CCNC: 40 U/L — HIGH
ANION GAP SERPL CALC-SCNC: 12 MMOL/L — SIGNIFICANT CHANGE UP (ref 5–17)
ANISOCYTOSIS BLD QL: SLIGHT — SIGNIFICANT CHANGE UP
AST SERPL-CCNC: 10 U/L — SIGNIFICANT CHANGE UP
BASOPHILS # BLD AUTO: 0.05 K/UL — SIGNIFICANT CHANGE UP (ref 0–0.2)
BASOPHILS NFR BLD AUTO: 0.9 % — SIGNIFICANT CHANGE UP (ref 0–2)
BILIRUB SERPL-MCNC: 0.5 MG/DL — SIGNIFICANT CHANGE UP (ref 0.4–2)
BLASTS # FLD: 0.9 % — HIGH (ref 0–0)
BUN SERPL-MCNC: 5.2 MG/DL — LOW (ref 8–20)
BURR CELLS BLD QL SMEAR: PRESENT — SIGNIFICANT CHANGE UP
CALCIUM SERPL-MCNC: 6.3 MG/DL — CRITICAL LOW (ref 8.6–10.2)
CHLORIDE SERPL-SCNC: 109 MMOL/L — HIGH (ref 98–107)
CO2 SERPL-SCNC: 17 MMOL/L — LOW (ref 22–29)
CREAT SERPL-MCNC: 0.43 MG/DL — LOW (ref 0.5–1.3)
DACRYOCYTES BLD QL SMEAR: SLIGHT — SIGNIFICANT CHANGE UP
EGFR: 130 ML/MIN/1.73M2 — SIGNIFICANT CHANGE UP
EOSINOPHIL # BLD AUTO: 0.05 K/UL — SIGNIFICANT CHANGE UP (ref 0–0.5)
EOSINOPHIL NFR BLD AUTO: 0.9 % — SIGNIFICANT CHANGE UP (ref 0–6)
GIANT PLATELETS BLD QL SMEAR: PRESENT — SIGNIFICANT CHANGE UP
GLUCOSE SERPL-MCNC: 84 MG/DL — SIGNIFICANT CHANGE UP (ref 70–99)
HCT VFR BLD CALC: 26.1 % — LOW (ref 34.5–45)
HGB BLD-MCNC: 8.6 G/DL — LOW (ref 11.5–15.5)
LYMPHOCYTES # BLD AUTO: 0.91 K/UL — LOW (ref 1–3.3)
LYMPHOCYTES # BLD AUTO: 15.7 % — SIGNIFICANT CHANGE UP (ref 13–44)
MAGNESIUM SERPL-MCNC: 1.7 MG/DL — SIGNIFICANT CHANGE UP (ref 1.6–2.6)
MANUAL SMEAR VERIFICATION: SIGNIFICANT CHANGE UP
MCHC RBC-ENTMCNC: 27.1 PG — SIGNIFICANT CHANGE UP (ref 27–34)
MCHC RBC-ENTMCNC: 33 GM/DL — SIGNIFICANT CHANGE UP (ref 32–36)
MCV RBC AUTO: 82.3 FL — SIGNIFICANT CHANGE UP (ref 80–100)
METAMYELOCYTES # FLD: 4.3 % — HIGH (ref 0–0)
MONOCYTES # BLD AUTO: 1.11 K/UL — HIGH (ref 0–0.9)
MONOCYTES NFR BLD AUTO: 19.1 % — HIGH (ref 2–14)
MYELOCYTES NFR BLD: 1.7 % — HIGH (ref 0–0)
NEUTROPHILS # BLD AUTO: 2.78 K/UL — SIGNIFICANT CHANGE UP (ref 1.8–7.4)
NEUTROPHILS NFR BLD AUTO: 40 % — LOW (ref 43–77)
NEUTS BAND # BLD: 7.8 % — SIGNIFICANT CHANGE UP (ref 0–8)
NRBC # BLD: 2 /100 — HIGH (ref 0–0)
OVALOCYTES BLD QL SMEAR: SLIGHT — SIGNIFICANT CHANGE UP
PLAT MORPH BLD: NORMAL — SIGNIFICANT CHANGE UP
PLATELET # BLD AUTO: 162 K/UL — SIGNIFICANT CHANGE UP (ref 150–400)
PLATELET CLUMP BLD QL SMEAR: ABNORMAL
POIKILOCYTOSIS BLD QL AUTO: SLIGHT — SIGNIFICANT CHANGE UP
POLYCHROMASIA BLD QL SMEAR: SLIGHT — SIGNIFICANT CHANGE UP
POTASSIUM SERPL-MCNC: 4.1 MMOL/L — SIGNIFICANT CHANGE UP (ref 3.5–5.3)
POTASSIUM SERPL-SCNC: 4.1 MMOL/L — SIGNIFICANT CHANGE UP (ref 3.5–5.3)
PROMYELOCYTES # FLD: 6.1 % — HIGH (ref 0–0)
PROT SERPL-MCNC: 5.4 G/DL — LOW (ref 6.6–8.7)
RBC # BLD: 3.17 M/UL — LOW (ref 3.8–5.2)
RBC # FLD: 17.8 % — HIGH (ref 10.3–14.5)
RBC BLD AUTO: ABNORMAL
SODIUM SERPL-SCNC: 138 MMOL/L — SIGNIFICANT CHANGE UP (ref 135–145)
VARIANT LYMPHS # BLD: 2.6 % — SIGNIFICANT CHANGE UP (ref 0–6)
WBC # BLD: 5.82 K/UL — SIGNIFICANT CHANGE UP (ref 3.8–10.5)
WBC # FLD AUTO: 5.82 K/UL — SIGNIFICANT CHANGE UP (ref 3.8–10.5)

## 2022-03-09 PROCEDURE — 99232 SBSQ HOSP IP/OBS MODERATE 35: CPT

## 2022-03-09 RX ORDER — MORPHINE SULFATE 50 MG/1
30 CAPSULE, EXTENDED RELEASE ORAL THREE TIMES A DAY
Refills: 0 | Status: DISCONTINUED | OUTPATIENT
Start: 2022-03-09 | End: 2022-03-10

## 2022-03-09 RX ORDER — LOPERAMIDE HCL 2 MG
2 TABLET ORAL DAILY
Refills: 0 | Status: DISCONTINUED | OUTPATIENT
Start: 2022-03-09 | End: 2022-03-10

## 2022-03-09 RX ORDER — METOCLOPRAMIDE HCL 10 MG
10 TABLET ORAL ONCE
Refills: 0 | Status: COMPLETED | OUTPATIENT
Start: 2022-03-09 | End: 2022-03-09

## 2022-03-09 RX ORDER — LANOLIN ALCOHOL/MO/W.PET/CERES
5 CREAM (GRAM) TOPICAL AT BEDTIME
Refills: 0 | Status: DISCONTINUED | OUTPATIENT
Start: 2022-03-09 | End: 2022-03-10

## 2022-03-09 RX ORDER — SACCHAROMYCES BOULARDII 250 MG
250 POWDER IN PACKET (EA) ORAL
Refills: 0 | Status: DISCONTINUED | OUTPATIENT
Start: 2022-03-09 | End: 2022-03-10

## 2022-03-09 RX ADMIN — CEFEPIME 100 MILLIGRAM(S): 1 INJECTION, POWDER, FOR SOLUTION INTRAMUSCULAR; INTRAVENOUS at 05:33

## 2022-03-09 RX ADMIN — MORPHINE SULFATE 30 MILLIGRAM(S): 50 CAPSULE, EXTENDED RELEASE ORAL at 21:32

## 2022-03-09 RX ADMIN — Medication 0.6 MILLIGRAM(S): at 18:00

## 2022-03-09 RX ADMIN — MORPHINE SULFATE 30 MILLIGRAM(S): 50 CAPSULE, EXTENDED RELEASE ORAL at 14:00

## 2022-03-09 RX ADMIN — CEFEPIME 100 MILLIGRAM(S): 1 INJECTION, POWDER, FOR SOLUTION INTRAMUSCULAR; INTRAVENOUS at 14:00

## 2022-03-09 RX ADMIN — DEXTROSE MONOHYDRATE, SODIUM CHLORIDE, AND POTASSIUM CHLORIDE 75 MILLILITER(S): 50; .745; 4.5 INJECTION, SOLUTION INTRAVENOUS at 02:17

## 2022-03-09 RX ADMIN — Medication 10 MILLIGRAM(S): at 19:41

## 2022-03-09 RX ADMIN — Medication 250 MILLIGRAM(S): at 18:01

## 2022-03-09 RX ADMIN — MORPHINE SULFATE 30 MILLIGRAM(S): 50 CAPSULE, EXTENDED RELEASE ORAL at 22:14

## 2022-03-09 RX ADMIN — Medication 100 MILLIEQUIVALENT(S): at 00:46

## 2022-03-09 RX ADMIN — CEFEPIME 100 MILLIGRAM(S): 1 INJECTION, POWDER, FOR SOLUTION INTRAMUSCULAR; INTRAVENOUS at 21:33

## 2022-03-09 RX ADMIN — PANTOPRAZOLE SODIUM 40 MILLIGRAM(S): 20 TABLET, DELAYED RELEASE ORAL at 12:23

## 2022-03-09 RX ADMIN — HYDROMORPHONE HYDROCHLORIDE 2 MILLIGRAM(S): 2 INJECTION INTRAMUSCULAR; INTRAVENOUS; SUBCUTANEOUS at 22:18

## 2022-03-09 RX ADMIN — Medication 0.6 MILLIGRAM(S): at 05:33

## 2022-03-09 RX ADMIN — HYDROMORPHONE HYDROCHLORIDE 2 MILLIGRAM(S): 2 INJECTION INTRAMUSCULAR; INTRAVENOUS; SUBCUTANEOUS at 10:26

## 2022-03-09 RX ADMIN — ONDANSETRON 4 MILLIGRAM(S): 8 TABLET, FILM COATED ORAL at 18:00

## 2022-03-09 RX ADMIN — HYDROMORPHONE HYDROCHLORIDE 2 MILLIGRAM(S): 2 INJECTION INTRAMUSCULAR; INTRAVENOUS; SUBCUTANEOUS at 12:23

## 2022-03-09 RX ADMIN — HYDROMORPHONE HYDROCHLORIDE 2 MILLIGRAM(S): 2 INJECTION INTRAMUSCULAR; INTRAVENOUS; SUBCUTANEOUS at 07:48

## 2022-03-09 RX ADMIN — ESCITALOPRAM OXALATE 5 MILLIGRAM(S): 10 TABLET, FILM COATED ORAL at 12:24

## 2022-03-09 RX ADMIN — HYDROMORPHONE HYDROCHLORIDE 2 MILLIGRAM(S): 2 INJECTION INTRAMUSCULAR; INTRAVENOUS; SUBCUTANEOUS at 18:00

## 2022-03-09 RX ADMIN — HYDROMORPHONE HYDROCHLORIDE 2 MILLIGRAM(S): 2 INJECTION INTRAMUSCULAR; INTRAVENOUS; SUBCUTANEOUS at 06:23

## 2022-03-09 RX ADMIN — ONDANSETRON 4 MILLIGRAM(S): 8 TABLET, FILM COATED ORAL at 06:23

## 2022-03-09 RX ADMIN — HYDROMORPHONE HYDROCHLORIDE 2 MILLIGRAM(S): 2 INJECTION INTRAMUSCULAR; INTRAVENOUS; SUBCUTANEOUS at 14:52

## 2022-03-09 RX ADMIN — HYDROMORPHONE HYDROCHLORIDE 2 MILLIGRAM(S): 2 INJECTION INTRAMUSCULAR; INTRAVENOUS; SUBCUTANEOUS at 02:16

## 2022-03-09 RX ADMIN — Medication 2 MILLIGRAM(S): at 12:24

## 2022-03-09 RX ADMIN — Medication 5 MILLIGRAM(S): at 21:33

## 2022-03-09 RX ADMIN — Medication 75 MILLIGRAM(S): at 18:00

## 2022-03-09 RX ADMIN — HYDROMORPHONE HYDROCHLORIDE 2 MILLIGRAM(S): 2 INJECTION INTRAMUSCULAR; INTRAVENOUS; SUBCUTANEOUS at 03:49

## 2022-03-09 NOTE — PROGRESS NOTE ADULT - SUBJECTIVE AND OBJECTIVE BOX
Chief complaint: Fever    Patient seen and examined at bedside. No acute overnight events reported. No headache, fever, chills, cough, chest pain, shortness of breath, nausea or vomiting. Reports improving diarrhea.     Vital Signs Last 24 Hrs  T(F): 97.9 (09 Mar 2022 04:41), Max: 98.4 (08 Mar 2022 16:47)  HR: 94 (09 Mar 2022 04:41) (92 - 97)  BP: 102/71 (09 Mar 2022 04:41) (102/71 - 105/69)  RR: 18 (09 Mar 2022 04:41) (17 - 18)  SpO2: 97% (09 Mar 2022 04:41) (94% - 97%)    Physical Exam:  Constitutional: alert and oriented, in no acute distress   Neck: Soft and supple  Respiratory: Clear to auscultation bilaterally, no wheezes or crackles  Cardiovascular: Regular rate and rhyhtm, no murmurs, gallops, rubs  Gastrointestinal: Soft, non-tender to palpation, +bs  Vascular: 2+ peripheral pulses  Neurological: A/O x 3, no focal neurological deficits  Musculoskeletal: 5/5 strength b/l upper and lower extremities, no lower extremity edema bilaterally    Labs:                        8.6    5.82  )-----------( 162      ( 09 Mar 2022 06:45 )             26.1   03-09    138  |  109<H>  |  5.2<L>  ----------------------------<  84  4.1   |  17.0<L>  |  0.43<L>    Ca    6.3<LL>      09 Mar 2022 06:45  Phos  2.7     03-07  Mg     1.7     03-09    TPro  5.4<L>  /  Alb  2.9<L>  /  TBili  0.5  /  DBili  x   /  AST  10  /  ALT  40<H>  /  AlkPhos  69  03-09

## 2022-03-09 NOTE — PROGRESS NOTE ADULT - ASSESSMENT
This 36 year old female with  Metastatic Right Breast CA (Infiltrating Ductal Carcinoma with mets to the Spine and Liver), Malignant Pleural Effusion on Home O2 (3-5 liters), Pathologic Compression Fractures (Follows with Dr. Santana), Anxiety who comes with abdominal pain x1 week, neutropenia and fever from [facility] office. Patient reports nausea and abdominal pain generalized as well as diarrhea for the past 1 week. Patient thought maybe from chemo (which she will sometimes have) however this extended past the typical time. Patient went to heme/onc office and was sent to the ED after found to be in neutropenic fever. Patient reports 'low grade' fevers at home, no chills. Denies cough, SOB, chest pain. Denies leg pain, swelling, erythema. Denies sick contacts.  In the ED found to have enterovirus and CT abdomen with enterocolitis. Medicine called to admit. (07 Mar 2022 17:43)    patient noted with fevers at home, but none here. cultures were sent by ER/ Admitting teams.       Impression:  fevers  neutropenia  breast cancer on chemotherapy.   enterocolitis  enterovirus infection  glossitis    Plan:  - fevers appear to have resolve  WBC also improving    for enterocolitis, GI PCR is negative  - it may be from the enterovirus as well.     on Cefepime.  Blood cultures remain negative, sent from 3/7/22    glossitis on exam  - improving  - likely from current illness.   continue supportive care.       When ready for discharge, can de-escalate to oral antibiotics such as Augmentin 875mg PO BID THRU and including 3/16/22    Please call me back if I may be of further assistance.   Thank you.

## 2022-03-09 NOTE — PROGRESS NOTE ADULT - ASSESSMENT
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 Santana), Anxiety who comes with abdominal pain x1 week, neutropenia and fever from [facility] office. Patient reports nausea and abdominal pain generalized as well as diarrhea for the past 1 week. Patient thought maybe from chemo (which she will sometimes have) however this extended past the typical time. Patient went to heme/onc office and was sent to the ED after found to be in neutropenic fever. In ED with enterovirus and colitis.    Neutropenic Fever  - likely caused by Enterovirus and colitis  - Cefepime 2g q8h  - ID consult appreciated  - C-diff negative   - Zofran PRN for nausea  - f/u blood cultures in process    Hypokalemia  - Resolved  - 4.1  - Repleted  - Monitor BMP    Multifactorial Anemia  - Likely dilutional, chemotherapy and malignancy  - No active bleeding  - Monitor CBC    Previous hx of pericardial effusion from past hospitalization  - Colchicine 0.6mg q12h    Metastatic right breast ca with met to spine and liver (stage IV)  - on taxifer and herceptin as outpatient every 21 days - last dose on 2/28  - continue pain regimen - IV dilaudid  - Morphine ER TID    DVT ppx  - SCD

## 2022-03-09 NOTE — PROGRESS NOTE ADULT - SUBJECTIVE AND OBJECTIVE BOX
[facility]                                                INFECTIOUS DISEASES  =======================================================                               Salazar Arango MD#  Que Lima MD*                                     Reagan Ulloa MD*    Valorie Dale MD*            Diplomates American Board of Internal Medicine & Infectious Diseases                  # [place] Office - Appt - Tel  [phone] Fax [phone]                * [place] Office - Appt - Tel [phone] Fax [phone]                                  Hospital Consult line:  [phone]  =======================================================      [id number]  NATIVIDAD MYERS   follow up: neutropenia, fever    neutropenia resolved  no more fevers      I have personally reviewed the labs and data; pertinent labs and data are listed in this note; please see below.   =======================================================  Past Medical & Surgical Hx:  =====================  PAST MEDICAL & SURGICAL HISTORY:  Breast CA  H/O compression fracture of spine  Anxiety  S/P tonsillectomy    Problem List:  ==========  HEALTH ISSUES - PROBLEM Dx:    Social Hx:  =======  no toxic habits currently    FAMILY HISTORY:  FH: CVA (cerebrovascular accident)    no significant family history of immunosuppressive disorders in mother or father   =======================================================    REVIEW OF SYSTEMS:  CONSTITUTIONAL:   FEVERS at home.   HEENT:  No diplopia or blurred vision.  No earache, sore throat or runny nose.  CARDIOVASCULAR:  No pressure, squeezing, strangling, tightness, heaviness or aching about the chest, neck, axilla or epigastrium.  RESPIRATORY:  No cough, shortness of breath  GASTROINTESTINAL:  No nausea, vomiting or diarrhea.  GENITOURINARY:  No dysuria, frequency or urgency. No Blood in urine  MUSCULOSKELETAL:  no joint aches, no muscle pain  SKIN:  No change in skin, hair or nails.  NEUROLOGIC:  No Headaches, seizures or weakness.  PSYCHIATRIC:  No disorder of thought or mood.  ENDOCRINE:  No heat or cold intolerance  HEMATOLOGICAL:  No easy bruising or bleeding.    =======================================================  Allergies  Perjeta (Other (Severe))  pertuzumab (Other (Severe))       ======================================================  Physical Exam:  ============     General:  No acute distress.  Eye: Pupils are equal, round and reactive to light, Extraocular movements are intact, Normal conjunctiva.  HENT: Normocephalic, Oral mucosa is moist, No pharyngeal erythema, No sinus tenderness.  TONGUE with multiple raised papillae  Neck: Supple, No lymphadenopathy.  Respiratory: Lungs are clear to auscultation, Respirations are non-labored.  Cardiovascular: Normal rate, Regular rhythm,   Gastrointestinal: Soft, Non-tender, Non-distended, Normal bowel sounds.  Genitourinary: No costovertebral angle tenderness.  Lymphatics: No lymphadenopathy neck,   Musculoskeletal: Normal range of motion, Normal strength.  Integumentary: No rash.  Neurologic: Alert, Oriented, No focal deficits, Cranial Nerves II-XII are grossly intact.  Psychiatric: Appropriate mood & affect.    =======================================================  Vitals:  ============  T(F): 97.9 (09 Mar 2022 04:41), Max: 98.4 (08 Mar 2022 16:47)  HR: 94 (09 Mar 2022 04:41)  BP: 102/71 (09 Mar 2022 04:41)  RR: 18 (09 Mar 2022 04:41)  SpO2: 97% (09 Mar 2022 04:41) (94% - 97%)  temp max in last 48H T(F): , Max: 99.6 (03-07-22 @ 12:42)    =======================================================  Current Antibiotics:  cefepime   IVPB 2000 milliGRAM(s) IV Intermittent every 8 hours    Other medications:  colchicine 0.6 milliGRAM(s) Oral every 12 hours  escitalopram 5 milliGRAM(s) Oral daily  loperamide 2 milliGRAM(s) Oral daily  morphine ER Tablet 30 milliGRAM(s) Oral three times a day  pantoprazole  Injectable 40 milliGRAM(s) IV Push daily  pregabalin 75 milliGRAM(s) Oral two times a day      =======================================================  Labs:                        8.6    5.82  )-----------( 162      ( 09 Mar 2022 06:45 )             26.1     03-09    138  |  109<H>  |  5.2<L>  ----------------------------<  84  4.1   |  17.0<L>  |  0.43<L>    Ca    6.3<LL>      09 Mar 2022 06:45  Phos  2.7     03-07  Mg     1.7     03-09    TPro  5.4<L>  /  Alb  2.9<L>  /  TBili  0.5  /  DBili  x   /  AST  10  /  ALT  40<H>  /  AlkPhos  69  03-09      GI PCR Panel, Stool (collected 03-08-22 @ 12:28)  Source: .Stool alex shay  Final Report (03-08-22 @ 17:28):    GI PCR Results: NOT detected    *******Please Note:*******    GI panel PCR evaluates for:    Campylobacter, Plesiomonas shigelloides, Salmonella,    Vibrio, Yersinia enterocolitica, Enteroaggregative    Escherichia coli (EAEC), Enteropathogenic E.coli (EPEC),    Enterotoxigenic E. coli (ETEC) lt/st, Shiga-like    toxin-producing E. coli (STEC) stx1/stx2,    Shigella/ Enteroinvasive E. coli (EIEC), Cryptosporidium,    Cyclospora cayetanensis, Entamoeba histolytica,    Giardia lamblia, Adenovirus F 40/41, Astrovirus,    Norovirus GI/GII, Rotavirus A, Sapovirus    Culture - Urine (collected 03-07-22 @ 22:10)  Source: Clean Catch Clean Catch (Midstream)  Final Report (03-08-22 @ 19:08):    <10,000 CFU/mL Normal Urogenital Stacie      Creatinine, Serum: 0.43 mg/dL (03-09-22 @ 06:45)  Creatinine, Serum: 0.47 mg/dL (03-08-22 @ 21:04)  Creatinine, Serum: 0.38 mg/dL (03-08-22 @ 15:09)  Creatinine, Serum: 0.43 mg/dL (03-08-22 @ 06:59)  Creatinine, Serum: 0.70 mg/dL (03-07-22 @ 12:33)         WBC Count: 5.82 K/uL (03-09-22 @ 06:45)  WBC Count: 3.18 K/uL (03-08-22 @ 10:46)  WBC Count: 2.39 K/uL (03-08-22 @ 06:59)  WBC Count: 1.20 K/uL (03-07-22 @ 12:33)    SARS-CoV-2: NotDetec (03-07-22 @ 12:31)      Alkaline Phosphatase, Serum: 69 U/L (03-09-22 @ 06:45)  Alkaline Phosphatase, Serum: 78 U/L (03-08-22 @ 06:59)  Alkaline Phosphatase, Serum: 118 U/L (03-07-22 @ 12:33)  Alanine Aminotransferase (ALT/SGPT): 40 U/L (03-09-22 @ 06:45)  Alanine Aminotransferase (ALT/SGPT): 63 U/L (03-08-22 @ 06:59)  Alanine Aminotransferase (ALT/SGPT): 114 U/L (03-07-22 @ 12:33)  Aspartate Aminotransferase (AST/SGOT): 10 U/L (03-09-22 @ 06:45)  Aspartate Aminotransferase (AST/SGOT): 10 U/L (03-08-22 @ 06:59)  Aspartate Aminotransferase (AST/SGOT): 17 U/L (03-07-22 @ 12:33)  Bilirubin Total, Serum: 0.5 mg/dL (03-09-22 @ 06:45)  Bilirubin Total, Serum: 0.9 mg/dL (03-08-22 @ 06:59)  Bilirubin Total, Serum: 1.9 mg/dL (03-07-22 @ 12:33)        Respiratory Viral Panel with COVID-19 by IRWIN (03.07.22 @ 12:31)    Rapid RVP Result: Detected    SARS-CoV-2: NotDetec: This Respiratory Panel uses polymerase chain reaction (PCR) to detect for  adenovirus; coronavirus (HKU1, NL63, 229E, OC43); human metapneumovirus  (hMPV); human enterovirus/rhinovirus (Entero/RV); influenza A; influenza  A/H1; influenza A/H3; influenza A/H1-2009; influenza B; parainfluenza  viruses 1, 2, 3, 4; respiratory syncytial virus; Mycoplasma pneumoniae;  Chlamydophila pneumoniae; and SARS-CoV-2.    Entero/Rhinovirus (RapRVP): Detected      < from: CT Abdomen and Pelvis w/ IV Cont (03.07.22 @ 16:11) >    ACC: [id number] EXAM:  CT ABDOMEN AND PELVIS IC                          PROCEDURE DATE:  03/07/2022          INTERPRETATION:  CLINICAL INFORMATION: Abdominal pain. History of   metastatic breast cancer.    COMPARISON: CT abdomen pelvis 11/3/2021.    CONTRAST/COMPLICATIONS:  IV Contrast: Omnipaque 300  95 cc administered   5 cc discarded  Oral Contrast: NONE  Complications: None reported at time of study completion    PROCEDURE:  CT of the Abdomen and Pelvis was performed.  Sagittal and coronal reformats were performed.    FINDINGS:  LOWER CHEST: Small left pleural effusion. Partially imaged central venous   catheter.    LIVER: Bilobar hypoattenuating lesions, decreased in size from prior CT   11/3/2021. For reference, left hepatic lobe lesion (3, 32) measures 1.4 x   1.1 cm, previously 2.7 x 2.4 cm.  BILE DUCTS: Normal caliber.  GALLBLADDER: Within normal limits.  SPLEEN: Within normal limits.  PANCREAS: Within normal limits.  ADRENALS: Within normal limits.  KIDNEYS/URETERS: Within normal limits.    BLADDER: Within normal limits.  REPRODUCTIVE ORGANS: Uterus and adnexa within normal limits.    BOWEL: No bowel obstruction. Appendix is normal. Diffuse colonic wall thickening and mucosal enhancement. Wall thickening and mucosal enhancement of the terminal ileum.  PERITONEUM: No ascites.  VESSELS: Within normal limits.  RETROPERITONEUM/LYMPH NODES: No lymphadenopathy.  ABDOMINAL WALL: Within normal limits.  BONES: Redemonstration of extensive osseous metastatic disease.    IMPRESSION:  Diffuse colonic wall thickening and involvement of the terminal ileum, compatible with enterocolitis.  Hepatic metastatic disease, improved from prior CT 11/3/2021.  Extensive osseous metastatic disease.  Small left pleural effusion.    --- End of Report ---      SHAWN KOROMA MD; Attending Radiologist  This document has been electronically signed. Mar  7 2022  4:40PM    < end of copied text >

## 2022-03-10 ENCOUNTER — TRANSCRIPTION ENCOUNTER (OUTPATIENT)
Age: 37
End: 2022-03-10

## 2022-03-10 VITALS
TEMPERATURE: 98 F | SYSTOLIC BLOOD PRESSURE: 122 MMHG | DIASTOLIC BLOOD PRESSURE: 76 MMHG | RESPIRATION RATE: 18 BRPM | OXYGEN SATURATION: 97 % | HEART RATE: 79 BPM

## 2022-03-10 LAB
ANION GAP SERPL CALC-SCNC: 13 MMOL/L — SIGNIFICANT CHANGE UP (ref 5–17)
BUN SERPL-MCNC: 3.1 MG/DL — LOW (ref 8–20)
CALCIUM SERPL-MCNC: 6.3 MG/DL — CRITICAL LOW (ref 8.6–10.2)
CHLORIDE SERPL-SCNC: 107 MMOL/L — SIGNIFICANT CHANGE UP (ref 98–107)
CO2 SERPL-SCNC: 20 MMOL/L — LOW (ref 22–29)
CREAT SERPL-MCNC: 0.43 MG/DL — LOW (ref 0.5–1.3)
EGFR: 130 ML/MIN/1.73M2 — SIGNIFICANT CHANGE UP
GLUCOSE SERPL-MCNC: 82 MG/DL — SIGNIFICANT CHANGE UP (ref 70–99)
HCT VFR BLD CALC: 30.1 % — LOW (ref 34.5–45)
HGB BLD-MCNC: 9.6 G/DL — LOW (ref 11.5–15.5)
MCHC RBC-ENTMCNC: 27.1 PG — SIGNIFICANT CHANGE UP (ref 27–34)
MCHC RBC-ENTMCNC: 31.9 GM/DL — LOW (ref 32–36)
MCV RBC AUTO: 85 FL — SIGNIFICANT CHANGE UP (ref 80–100)
PLATELET # BLD AUTO: 174 K/UL — SIGNIFICANT CHANGE UP (ref 150–400)
POTASSIUM SERPL-MCNC: 3.7 MMOL/L — SIGNIFICANT CHANGE UP (ref 3.5–5.3)
POTASSIUM SERPL-SCNC: 3.7 MMOL/L — SIGNIFICANT CHANGE UP (ref 3.5–5.3)
RBC # BLD: 3.54 M/UL — LOW (ref 3.8–5.2)
RBC # FLD: 18.7 % — HIGH (ref 10.3–14.5)
SODIUM SERPL-SCNC: 139 MMOL/L — SIGNIFICANT CHANGE UP (ref 135–145)
WBC # BLD: 6.35 K/UL — SIGNIFICANT CHANGE UP (ref 3.8–10.5)
WBC # FLD AUTO: 6.35 K/UL — SIGNIFICANT CHANGE UP (ref 3.8–10.5)

## 2022-03-10 PROCEDURE — 85027 COMPLETE CBC AUTOMATED: CPT

## 2022-03-10 PROCEDURE — 84702 CHORIONIC GONADOTROPIN TEST: CPT

## 2022-03-10 PROCEDURE — 86900 BLOOD TYPING SEROLOGIC ABO: CPT

## 2022-03-10 PROCEDURE — 99285 EMERGENCY DEPT VISIT HI MDM: CPT | Mod: 25

## 2022-03-10 PROCEDURE — 87507 IADNA-DNA/RNA PROBE TQ 12-25: CPT

## 2022-03-10 PROCEDURE — 84132 ASSAY OF SERUM POTASSIUM: CPT

## 2022-03-10 PROCEDURE — 86850 RBC ANTIBODY SCREEN: CPT

## 2022-03-10 PROCEDURE — 82435 ASSAY OF BLOOD CHLORIDE: CPT

## 2022-03-10 PROCEDURE — 96375 TX/PRO/DX INJ NEW DRUG ADDON: CPT

## 2022-03-10 PROCEDURE — 87493 C DIFF AMPLIFIED PROBE: CPT

## 2022-03-10 PROCEDURE — 82947 ASSAY GLUCOSE BLOOD QUANT: CPT

## 2022-03-10 PROCEDURE — 87040 BLOOD CULTURE FOR BACTERIA: CPT

## 2022-03-10 PROCEDURE — 74177 CT ABD & PELVIS W/CONTRAST: CPT | Mod: MA

## 2022-03-10 PROCEDURE — 80053 COMPREHEN METABOLIC PANEL: CPT

## 2022-03-10 PROCEDURE — 96374 THER/PROPH/DIAG INJ IV PUSH: CPT

## 2022-03-10 PROCEDURE — 93005 ELECTROCARDIOGRAM TRACING: CPT

## 2022-03-10 PROCEDURE — 86901 BLOOD TYPING SEROLOGIC RH(D): CPT

## 2022-03-10 PROCEDURE — 71045 X-RAY EXAM CHEST 1 VIEW: CPT

## 2022-03-10 PROCEDURE — 84295 ASSAY OF SERUM SODIUM: CPT

## 2022-03-10 PROCEDURE — 36415 COLL VENOUS BLD VENIPUNCTURE: CPT

## 2022-03-10 PROCEDURE — 85014 HEMATOCRIT: CPT

## 2022-03-10 PROCEDURE — 85025 COMPLETE CBC W/AUTO DIFF WBC: CPT

## 2022-03-10 PROCEDURE — 85018 HEMOGLOBIN: CPT

## 2022-03-10 PROCEDURE — 82330 ASSAY OF CALCIUM: CPT

## 2022-03-10 PROCEDURE — 99239 HOSP IP/OBS DSCHRG MGMT >30: CPT

## 2022-03-10 PROCEDURE — 96372 THER/PROPH/DIAG INJ SC/IM: CPT

## 2022-03-10 PROCEDURE — 85730 THROMBOPLASTIN TIME PARTIAL: CPT

## 2022-03-10 PROCEDURE — 87086 URINE CULTURE/COLONY COUNT: CPT

## 2022-03-10 PROCEDURE — 83735 ASSAY OF MAGNESIUM: CPT

## 2022-03-10 PROCEDURE — 83605 ASSAY OF LACTIC ACID: CPT

## 2022-03-10 PROCEDURE — 80048 BASIC METABOLIC PNL TOTAL CA: CPT

## 2022-03-10 PROCEDURE — 81001 URINALYSIS AUTO W/SCOPE: CPT

## 2022-03-10 PROCEDURE — 0225U NFCT DS DNA&RNA 21 SARSCOV2: CPT

## 2022-03-10 PROCEDURE — 82803 BLOOD GASES ANY COMBINATION: CPT

## 2022-03-10 PROCEDURE — 84100 ASSAY OF PHOSPHORUS: CPT

## 2022-03-10 PROCEDURE — 85610 PROTHROMBIN TIME: CPT

## 2022-03-10 PROCEDURE — 96376 TX/PRO/DX INJ SAME DRUG ADON: CPT

## 2022-03-10 RX ORDER — SACCHAROMYCES BOULARDII 250 MG
1 POWDER IN PACKET (EA) ORAL
Qty: 14 | Refills: 0
Start: 2022-03-10 | End: 2022-03-16

## 2022-03-10 RX ORDER — LOPERAMIDE HCL 2 MG
1 TABLET ORAL
Qty: 3 | Refills: 0
Start: 2022-03-10 | End: 2022-03-12

## 2022-03-10 RX ADMIN — Medication 0.6 MILLIGRAM(S): at 05:12

## 2022-03-10 RX ADMIN — Medication 250 MILLIGRAM(S): at 05:13

## 2022-03-10 RX ADMIN — HYDROMORPHONE HYDROCHLORIDE 2 MILLIGRAM(S): 2 INJECTION INTRAMUSCULAR; INTRAVENOUS; SUBCUTANEOUS at 04:42

## 2022-03-10 RX ADMIN — HYDROMORPHONE HYDROCHLORIDE 2 MILLIGRAM(S): 2 INJECTION INTRAMUSCULAR; INTRAVENOUS; SUBCUTANEOUS at 08:07

## 2022-03-10 RX ADMIN — HYDROMORPHONE HYDROCHLORIDE 2 MILLIGRAM(S): 2 INJECTION INTRAMUSCULAR; INTRAVENOUS; SUBCUTANEOUS at 04:20

## 2022-03-10 RX ADMIN — Medication 75 MILLIGRAM(S): at 05:13

## 2022-03-10 RX ADMIN — CEFEPIME 100 MILLIGRAM(S): 1 INJECTION, POWDER, FOR SOLUTION INTRAMUSCULAR; INTRAVENOUS at 05:11

## 2022-03-10 RX ADMIN — HYDROMORPHONE HYDROCHLORIDE 2 MILLIGRAM(S): 2 INJECTION INTRAMUSCULAR; INTRAVENOUS; SUBCUTANEOUS at 04:26

## 2022-03-10 RX ADMIN — ONDANSETRON 4 MILLIGRAM(S): 8 TABLET, FILM COATED ORAL at 04:26

## 2022-03-10 RX ADMIN — MORPHINE SULFATE 30 MILLIGRAM(S): 50 CAPSULE, EXTENDED RELEASE ORAL at 05:12

## 2022-03-10 NOTE — DISCHARGE NOTE NURSING/CASE MANAGEMENT/SOCIAL WORK - PATIENT PORTAL LINK FT
You can access the FollowMyHealth Patient Portal offered by [facility] by registering at the following website: [email or web address]. By joining [facility]’s FollowMyHealth portal, you will also be able to view your health information using other applications (apps) compatible with our system.

## 2022-03-10 NOTE — DISCHARGE NOTE PROVIDER - ATTENDING DISCHARGE PHYSICAL EXAMINATION:
Vital Signs Last 24 Hrs  T(F): 97.8 (10 Mar 2022 04:10), Max: 98 (09 Mar 2022 17:15)  HR: 89 (10 Mar 2022 04:10) (89 - 99)  BP: 103/69 (10 Mar 2022 04:10) (100/67 - 120/76)  RR: 18 (10 Mar 2022 04:10) (17 - 18)  SpO2: 99% (10 Mar 2022 04:10) (95% - 99%)    Physical Exam:  Constitutional: alert and oriented, in no acute distress   Neck: Soft and supple  Respiratory: Clear to auscultation bilaterally, no wheezes or crackles  Cardiovascular: Regular rate and rhythm, no murmurs, gallops, rubs  Gastrointestinal: Soft, non-tender to palpation, +bs  Vascular: 2+ peripheral pulses  Neurological: A/O x 3, no focal neurological deficits  Musculoskeletal: 5/5 strength b/l upper and lower extremities, no lower extremity edema bilaterally

## 2022-03-10 NOTE — DISCHARGE NOTE PROVIDER - NSDCCPCAREPLAN_GEN_ALL_CORE_FT
PRINCIPAL DISCHARGE DIAGNOSIS  Diagnosis: Neutropenic fever  Assessment and Plan of Treatment: - Resolved  - Please follow up with your Oncologist in 1 week      SECONDARY DISCHARGE DIAGNOSES  Diagnosis: Enterocolitis  Assessment and Plan of Treatment: - Please take Augmentin 875mg PO BID through 3/16/22

## 2022-03-10 NOTE — DISCHARGE NOTE PROVIDER - HOSPITAL COURSE
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 Santana) presented with abdominal pain, diarrhea, neutropenia and fever. The patient was admitted for neutropenic fever and was treated with Cefepime. C-diff was negative and diarrhea resolved. The patient is hemodynamically stable for discharge with appropriate follow up.

## 2022-03-10 NOTE — PROGRESS NOTE ADULT - ASSESSMENT
Imp:  35 yo female with metastatic breast cancer, receiving Taxotere and Herceptin.  Admitted with abd pain, neutropenia, low grade fevers.  Improved on IV antibiotics    neutropenia  resolved,  WBC 6.35  DC neupogen  on IV abx  cultures negative  as per ID OK to change to PO augmentin    Anemia  multifactorial  Hgb stable at 9.6 today    met breast cancer  on active chemo  outpt f/u with Dr Chapa    DVT ppx   Imp:  37 yo female with metastatic breast cancer, receiving Taxotere and Herceptin.  Admitted with abd pain, neutropenia, low grade fevers.  Improved on IV antibiotics    neutropenia  resolved,  WBC 6.35  DC neupogen  on IV abx  cultures negative  as per ID OK to change to PO augmentin    Anemia  multifactorial  Hgb stable at 9.6 today    met breast cancer  on active chemo  going home today  PET CT next week  outpt f/u with Dr Chapa    DVT ppx

## 2022-03-10 NOTE — PATIENT PROFILE ADULT - FALL HARM RISK - HARM RISK INTERVENTIONS

## 2022-03-10 NOTE — PROGRESS NOTE ADULT - SUBJECTIVE AND OBJECTIVE BOX
HPI: Patient is a 36y Female seen on consultation for the evaluation and management of Metastatic breast cancer to spine, liver, with malignant pleural effusion, pathologic compression fractures anxiety.  Receiving chemotherapy through [facility]; receiving Taxotere, Herceptin; last received last week.  Has been requiring IV hydration in office.  Presented yesterday to office with increasing abd pain, c/o diarrhea, low grade fever, low WBC.  Sent to ER.  Given Hydration, Cefapime.  Feeling much better.  Abd pain improved; no current c/o nausea, vomiting or of diarrhea.  No c/o bleeding.  Denies SOB or chest pain, headaches or dizziness.     3/10 - afebrile, abdominal pain improved.      MEDICATIONS  (STANDING):  cefepime   IVPB 2000 milliGRAM(s) IV Intermittent every 8 hours  colchicine 0.6 milliGRAM(s) Oral every 12 hours  escitalopram 5 milliGRAM(s) Oral daily  loperamide 2 milliGRAM(s) Oral daily  melatonin 5 milliGRAM(s) Oral at bedtime  morphine ER Tablet 30 milliGRAM(s) Oral three times a day  pantoprazole  Injectable 40 milliGRAM(s) IV Push daily  pregabalin 75 milliGRAM(s) Oral two times a day  saccharomyces boulardii 250 milliGRAM(s) Oral two times a day    MEDICATIONS  (PRN):  ALPRAZolam 0.5 milliGRAM(s) Oral every 8 hours PRN axniety  HYDROmorphone  Injectable 2 milliGRAM(s) IV Push every 4 hours PRN Moderate Pain (4 - 6)  HYDROmorphone  Injectable 2 milliGRAM(s) IV Push every 4 hours PRN Severe Pain (7 - 10)  ondansetron Injectable 4 milliGRAM(s) IV Push every 8 hours PRN Nausea and/or Vomiting      ICU Vital Signs Last 24 Hrs  ICU Vital Signs Last 24 Hrs  T(C): 36.6 (10 Mar 2022 04:10), Max: 36.7 (09 Mar 2022 17:15)  T(F): 97.8 (10 Mar 2022 04:10), Max: 98 (09 Mar 2022 17:15)  HR: 89 (10 Mar 2022 04:10) (89 - 99)  BP: 103/69 (10 Mar 2022 04:10) (100/67 - 120/76)  BP(mean): --  ABP: --  ABP(mean): --  RR: 18 (10 Mar 2022 04:10) (17 - 18)  SpO2: 99% (10 Mar 2022 04:10) (95% - 99%)        PHYSICAL EXAM:    WDWN, awake, alert, comfortable  Neck:  No adenopathy  Port intact  Lungs clear  Cor:  RRR normal S1S2  Abd:  Soft, non-tender, pos BS  Extrem without edema            LABS:                          9.6    6.35  )-----------( 174      ( 10 Mar 2022 07:07 )             30.1                         8.8    2.39  )-----------( 183      ( 08 Mar 2022 06:59 )             26.4       03-10    139  |  107  |  3.1<L>  ----------------------------<  82  3.7   |  20.0<L>  |  0.43<L>    Ca    6.3<LL>      10 Mar 2022 07:07  Mg     1.7     03-09    TPro  5.4<L>  /  Alb  2.9<L>  /  TBili  0.5  /  DBili  x   /  AST  10  /  ALT  40<H>  /  AlkPhos  69  03-09    03-08    136  |  102  |  9.7  ----------------------------<  105<H>  3.0<L>   |  19.0<L>  |  0.43<L>    Ca    6.3<LL>      08 Mar 2022 06:59  Phos  2.7     03-07  Mg     1.9     03-08    TPro  5.7<L>  /  Alb  3.0<L>  /  TBili  0.9  /  DBili  x   /  AST  10  /  ALT  63<H>  /  AlkPhos  78  03-08    PT/INR - ( 07 Mar 2022 12:33 )   PT: 19.3 sec;   INR: 1.66 ratio         PTT - ( 07 Mar 2022 12:33 )  PTT:33.3 sec  Urinalysis Basic - ( 07 Mar 2022 15:07 )    Color: Yellow / Appearance: Clear / S.015 / pH: x  Gluc: x / Ketone: Small  / Bili: Negative / Urobili: Negative mg/dL   Blood: x / Protein: 15 / Nitrite: Negative   Leuk Esterase: Trace / RBC: Negative /HPF / WBC 3-5 /HPF   Sq Epi: x / Non Sq Epi: Moderate / Bacteria: Many        RADIOLOGY & ADDITIONAL STUDIES: HPI: Patient is a 36y Female seen on consultation for the evaluation and management of Metastatic breast cancer to spine, liver, with malignant pleural effusion, pathologic compression fractures anxiety.  Receiving chemotherapy through [facility]; receiving Taxotere, Herceptin; last received last week.  Has been requiring IV hydration in office.  Presented yesterday to office with increasing abd pain, c/o diarrhea, low grade fever, low WBC.  Sent to ER.  Given Hydration, Cefapime.  Feeling much better.  Abd pain improved; no current c/o nausea, vomiting or of diarrhea.  No c/o bleeding.  Denies SOB or chest pain, headaches or dizziness.     3/10 - afebrile, abdominal pain improved. going home today      MEDICATIONS  (STANDING):  cefepime   IVPB 2000 milliGRAM(s) IV Intermittent every 8 hours  colchicine 0.6 milliGRAM(s) Oral every 12 hours  escitalopram 5 milliGRAM(s) Oral daily  loperamide 2 milliGRAM(s) Oral daily  melatonin 5 milliGRAM(s) Oral at bedtime  morphine ER Tablet 30 milliGRAM(s) Oral three times a day  pantoprazole  Injectable 40 milliGRAM(s) IV Push daily  pregabalin 75 milliGRAM(s) Oral two times a day  saccharomyces boulardii 250 milliGRAM(s) Oral two times a day    MEDICATIONS  (PRN):  ALPRAZolam 0.5 milliGRAM(s) Oral every 8 hours PRN axniety  HYDROmorphone  Injectable 2 milliGRAM(s) IV Push every 4 hours PRN Moderate Pain (4 - 6)  HYDROmorphone  Injectable 2 milliGRAM(s) IV Push every 4 hours PRN Severe Pain (7 - 10)  ondansetron Injectable 4 milliGRAM(s) IV Push every 8 hours PRN Nausea and/or Vomiting      ICU Vital Signs Last 24 Hrs  ICU Vital Signs Last 24 Hrs  T(C): 36.6 (10 Mar 2022 04:10), Max: 36.7 (09 Mar 2022 17:15)  T(F): 97.8 (10 Mar 2022 04:10), Max: 98 (09 Mar 2022 17:15)  HR: 89 (10 Mar 2022 04:10) (89 - 99)  BP: 103/69 (10 Mar 2022 04:10) (100/67 - 120/76)  BP(mean): --  ABP: --  ABP(mean): --  RR: 18 (10 Mar 2022 04:10) (17 - 18)  SpO2: 99% (10 Mar 2022 04:10) (95% - 99%)        PHYSICAL EXAM:    WDWN, awake, alert, comfortable  Neck:  No adenopathy  Port intact  Lungs clear  Cor:  RRR normal S1S2  Abd:  Soft, non-tender, pos BS  Extrem without edema            LABS:                          9.6    6.35  )-----------( 174      ( 10 Mar 2022 07:07 )             30.1                         8.8    2.39  )-----------( 183      ( 08 Mar 2022 06:59 )             26.4       03-10    139  |  107  |  3.1<L>  ----------------------------<  82  3.7   |  20.0<L>  |  0.43<L>    Ca    6.3<LL>      10 Mar 2022 07:07  Mg     1.7     03-09    TPro  5.4<L>  /  Alb  2.9<L>  /  TBili  0.5  /  DBili  x   /  AST  10  /  ALT  40<H>  /  AlkPhos  69  03-09    03-08    136  |  102  |  9.7  ----------------------------<  105<H>  3.0<L>   |  19.0<L>  |  0.43<L>    Ca    6.3<LL>      08 Mar 2022 06:59  Phos  2.7     03-07  Mg     1.9     03-08    TPro  5.7<L>  /  Alb  3.0<L>  /  TBili  0.9  /  DBili  x   /  AST  10  /  ALT  63<H>  /  AlkPhos  78  03-08    PT/INR - ( 07 Mar 2022 12:33 )   PT: 19.3 sec;   INR: 1.66 ratio         PTT - ( 07 Mar 2022 12:33 )  PTT:33.3 sec  Urinalysis Basic - ( 07 Mar 2022 15:07 )    Color: Yellow / Appearance: Clear / S.015 / pH: x  Gluc: x / Ketone: Small  / Bili: Negative / Urobili: Negative mg/dL   Blood: x / Protein: 15 / Nitrite: Negative   Leuk Esterase: Trace / RBC: Negative /HPF / WBC 3-5 /HPF   Sq Epi: x / Non Sq Epi: Moderate / Bacteria: Many        RADIOLOGY & ADDITIONAL STUDIES:

## 2022-03-10 NOTE — CDI QUERY NOTE - NSCDIOTHERTXTBX_GEN_ALL_CORE_HH
Magnesium levels:  Magnesium, Serum: 1.7 mg/dL [1.6 - 2.6] (03-09-22)  Magnesium, Serum: 1.9 mg/dL [1.6 - 2.6] (03-08-22)  Magnesium, Serum: 1.5 mg/dL *L* [1.6 - 2.6] (03-07-22)    Treatment with Magnesium Sulfate IVPB    Is there a diagnosis associated with the low magnesium and the treatment provided?    1) Hypomagnesemia resolved  2) Other   3) not clinically significant

## 2022-03-10 NOTE — DISCHARGE NOTE PROVIDER - NSDCMRMEDTOKEN_GEN_ALL_CORE_FT
Albuterol (Eqv-Proventil HFA) 90 mcg/inh inhalation aerosol: 2 puff(s) inhaled every 6 hours, As Needed shortness of breath  albuterol 0.63 mg/3 mL (0.021%) inhalation solution: 3 milliliter(s) inhaled 3 times a day, As Needed  ALPRAZolam 0.5 mg oral tablet: 1 tab(s) orally every 8 hours, As Needed for anxiety  amoxicillin-clavulanate 875 mg-125 mg oral tablet: 1 tab(s) orally 2 times a day   carisoprodol 250 mg oral tablet: 1 tab(s) orally 4 times a day, As Needed for muscle spasm  colchicine 0.6 mg oral tablet: 1 tab(s) orally every 12 hours  escitalopram 5 mg oral tablet: 1 tab(s) orally once a day  HYDROmorphone 4 mg oral tablet: 1 tab(s) orally every 6 hours, As Needed for pain  loperamide 2 mg oral capsule: 1 cap(s) orally once a day  modafinil 100 mg oral tablet: 1 tab(s) orally once a day (in the morning)  morphine 30 mg/8 to 12 hr oral tablet, extended release: 1 tab(s) orally every 8 hours  pantoprazole 40 mg oral delayed release tablet: 1 tab(s) orally once a day (before a meal)  polyethylene glycol 3350 oral powder for reconstitution: 17 gram(s) orally once a day  pregabalin 75 mg oral capsule: 1 cap(s) orally 2 times a day  promethazine 12.5 mg oral tablet: 1 tab(s) orally 2 times a day  saccharomyces boulardii lyo 250 mg oral capsule: 1 cap(s) orally 2 times a day  senna oral tablet: 2 tab(s) orally once a day (at bedtime)

## 2022-03-12 LAB
CULTURE RESULTS: SIGNIFICANT CHANGE UP
CULTURE RESULTS: SIGNIFICANT CHANGE UP
SPECIMEN SOURCE: SIGNIFICANT CHANGE UP
SPECIMEN SOURCE: SIGNIFICANT CHANGE UP

## 2022-03-15 ENCOUNTER — APPOINTMENT (OUTPATIENT)
Dept: NUCLEAR MEDICINE | Facility: CLINIC | Age: 37
End: 2022-03-15

## 2022-04-06 ENCOUNTER — APPOINTMENT (OUTPATIENT)
Dept: RADIATION ONCOLOGY | Facility: CLINIC | Age: 37
End: 2022-04-06
Payer: MEDICAID

## 2022-04-06 PROCEDURE — 99024 POSTOP FOLLOW-UP VISIT: CPT

## 2022-04-06 NOTE — VITALS
[Maximal Pain Intensity: 7/10] : 7/10 [Least Pain Intensity: 2/10] : 2/10 [Pain Description/Quality: ___] : Pain description/quality: [unfilled] [Pain Duration: ___] : Pain duration: [unfilled] [Pain Location: ___] : Pain Location: [unfilled] [Pain Interferes with ADLs] : Pain interferes with activities of daily living. [Opioid] : opioid [70: Cares for self; unalbe to carry on normal activity or do active work.] : 70: Cares for self; unable to carry on normal activity or do active work. [ECOG Performance Status: 2 - Ambulatory and capable of all self care but unable to carry out any work activities] : Performance Status: 2 - Ambulatory and capable of all self care but unable to carry out any work activities. Up and about more than 50% of waking hours

## 2022-04-08 ENCOUNTER — APPOINTMENT (OUTPATIENT)
Dept: ORTHOPEDIC SURGERY | Facility: CLINIC | Age: 37
End: 2022-04-08
Payer: MEDICAID

## 2022-04-08 VITALS
DIASTOLIC BLOOD PRESSURE: 78 MMHG | WEIGHT: 153 LBS | BODY MASS INDEX: 28.89 KG/M2 | SYSTOLIC BLOOD PRESSURE: 121 MMHG | HEIGHT: 61 IN | HEART RATE: 106 BPM

## 2022-04-08 DIAGNOSIS — D49.59 NEOPLASM UNSPECIFIED BEHAVIOR OF OTHER GENITOURINARY ORGAN: ICD-10-CM

## 2022-04-08 DIAGNOSIS — R29.2 ABNORMAL REFLEX: ICD-10-CM

## 2022-04-08 PROCEDURE — 99214 OFFICE O/P EST MOD 30 MIN: CPT

## 2022-04-15 NOTE — REVIEW OF SYSTEMS
[Negative] : Allergic/Immunologic [FreeTextEntry6] : Hx pulmonary embolism, lung metastasis [FreeTextEntry9] : pain r/t multiple scattered bone mets to spine, pelvis, humerus [de-identified] : embolism

## 2022-04-15 NOTE — HISTORY OF PRESENT ILLNESS
[FreeTextEntry1] : This is a 36 year-old woman is conferencing for post-treatment evaluation with a newly diagnosed widely metastatic breast cancer with multiple painful osseous metastases.  Ms. Dave completed palliative radiotherapy to the L-S spine on 2/28/2022.\par \par Reports she is continues taking extended release pain medication daily, and takes prn pain medication intermittently mostly for increasing neck pain.\par \par Patient has appointment with Dr. Santana 4/8/2022 to discuss kyphoplasty to neck.  \par  \par Reports PET/CT was done in March in [facility] system at the direction of Dr. Chapa scans after that to determine chemotherapy effectiveness, then possible 2 more cycles of chemotherapy.\par

## 2022-04-15 NOTE — DISEASE MANAGEMENT
[Clinical] : TNM Stage: c [IV] : IV [FreeTextEntry4] : metastatic adenocarcinoma [TTNM] : 2 [NTNM] : 3 [MTNM] : 1 [de-identified] : 2000 cGy [de-identified] : lumbosacral spine and pelvis

## 2022-04-21 ENCOUNTER — APPOINTMENT (OUTPATIENT)
Dept: MRI IMAGING | Facility: CLINIC | Age: 37
End: 2022-04-21
Payer: MEDICAID

## 2022-04-21 ENCOUNTER — OUTPATIENT (OUTPATIENT)
Dept: OUTPATIENT SERVICES | Facility: HOSPITAL | Age: 37
LOS: 1 days | End: 2022-04-21

## 2022-04-21 DIAGNOSIS — Z00.8 ENCOUNTER FOR OTHER GENERAL EXAMINATION: ICD-10-CM

## 2022-04-21 DIAGNOSIS — Z90.89 ACQUIRED ABSENCE OF OTHER ORGANS: Chronic | ICD-10-CM

## 2022-04-21 PROCEDURE — 72141 MRI NECK SPINE W/O DYE: CPT | Mod: 26

## 2022-04-21 PROCEDURE — 72148 MRI LUMBAR SPINE W/O DYE: CPT | Mod: 26

## 2022-04-21 PROCEDURE — 72146 MRI CHEST SPINE W/O DYE: CPT | Mod: 26

## 2022-04-26 ENCOUNTER — APPOINTMENT (OUTPATIENT)
Dept: ORTHOPEDIC SURGERY | Facility: CLINIC | Age: 37
End: 2022-04-26
Payer: MEDICAID

## 2022-04-26 PROCEDURE — 99442: CPT

## 2022-04-29 ENCOUNTER — OUTPATIENT (OUTPATIENT)
Dept: OUTPATIENT SERVICES | Facility: HOSPITAL | Age: 37
LOS: 1 days | End: 2022-04-29
Payer: MEDICAID

## 2022-04-29 DIAGNOSIS — R93.7 ABNORMAL FINDINGS ON DIAGNOSTIC IMAGING OF OTHER PARTS OF MUSCULOSKELETAL SYSTEM: ICD-10-CM

## 2022-04-29 DIAGNOSIS — C50.411 MALIGNANT NEOPLASM OF UPPER-OUTER QUADRANT OF RIGHT FEMALE BREAST: ICD-10-CM

## 2022-04-29 DIAGNOSIS — Z90.89 ACQUIRED ABSENCE OF OTHER ORGANS: Chronic | ICD-10-CM

## 2022-04-29 PROCEDURE — 93306 TTE W/DOPPLER COMPLETE: CPT | Mod: 26

## 2022-04-29 PROCEDURE — 93306 TTE W/DOPPLER COMPLETE: CPT

## 2022-06-03 ENCOUNTER — NON-APPOINTMENT (OUTPATIENT)
Age: 37
End: 2022-06-03

## 2022-07-19 ENCOUNTER — NON-APPOINTMENT (OUTPATIENT)
Age: 37
End: 2022-07-19

## 2022-07-19 ENCOUNTER — APPOINTMENT (OUTPATIENT)
Dept: OBGYN | Facility: CLINIC | Age: 37
End: 2022-07-19

## 2022-07-19 VITALS
WEIGHT: 159 LBS | HEIGHT: 61 IN | DIASTOLIC BLOOD PRESSURE: 70 MMHG | SYSTOLIC BLOOD PRESSURE: 110 MMHG | BODY MASS INDEX: 30.02 KG/M2

## 2022-07-19 DIAGNOSIS — Z87.42 PERSONAL HISTORY OF OTHER DISEASES OF THE FEMALE GENITAL TRACT: ICD-10-CM

## 2022-07-19 DIAGNOSIS — Z01.419 ENCOUNTER FOR GYNECOLOGICAL EXAMINATION (GENERAL) (ROUTINE) W/OUT ABNORMAL FINDINGS: ICD-10-CM

## 2022-07-19 PROCEDURE — 99385 PREV VISIT NEW AGE 18-39: CPT

## 2022-07-19 NOTE — PLAN
[FreeTextEntry1] : 36-year-old female well woman exam\par \par 1.  Pap done\par 2.  Breast exam deferred.  Patient currently being treated for metastatic breast cancer.  Emotional support provided.\par 3.  Annual exam in 1 year

## 2022-07-19 NOTE — HISTORY OF PRESENT ILLNESS
[FreeTextEntry1] : 36-year-old female presents for well woman exam.  She is a new patient to our office.  Her last GYN exam was 1 year ago.  The patient was recently diagnosed with stage IV breast cancer.  She is currently undergoing chemotherapy and radiation with [facility] blood.  She has not had her menses since 2021 when the chemo started.  Her GYN history is significant for an abnormal Pap smear.  She had a colposcopy that showed abnormal cells and had a LEEP procedure.  She is complaining of hemorrhoids today.  She states she was straining to have to use the bathroom.  She has since started using Colace and is no longer straining.  She is wondering what else she can use for the hemorrhoids.\par \par Past medical history is significant for stage IV breast cancer.  Past surgical history includes a LEEP procedure.  Family history is noncontributory.  She denies tobacco alcohol or illicit drugs.  GYN history as above.  OB history -0-0-1.  History of full-term vaginal delivery without complications.  She is allergic to perjeta.  Please see med list for medications.

## 2022-07-22 ENCOUNTER — OUTPATIENT (OUTPATIENT)
Dept: OUTPATIENT SERVICES | Facility: HOSPITAL | Age: 37
LOS: 1 days | End: 2022-07-22
Payer: MEDICAID

## 2022-07-22 DIAGNOSIS — C50.411 MALIGNANT NEOPLASM OF UPPER-OUTER QUADRANT OF RIGHT FEMALE BREAST: ICD-10-CM

## 2022-07-22 DIAGNOSIS — R93.7 ABNORMAL FINDINGS ON DIAGNOSTIC IMAGING OF OTHER PARTS OF MUSCULOSKELETAL SYSTEM: ICD-10-CM

## 2022-07-22 DIAGNOSIS — Z90.89 ACQUIRED ABSENCE OF OTHER ORGANS: Chronic | ICD-10-CM

## 2022-07-22 PROCEDURE — C8929: CPT

## 2022-07-22 PROCEDURE — 93306 TTE W/DOPPLER COMPLETE: CPT | Mod: 26

## 2022-07-26 LAB
CYTOLOGY CVX/VAG DOC THIN PREP: NORMAL
HPV HIGH+LOW RISK DNA PNL CVX: NOT DETECTED

## 2022-07-31 ENCOUNTER — APPOINTMENT (OUTPATIENT)
Dept: MRI IMAGING | Facility: CLINIC | Age: 37
End: 2022-07-31
Payer: MEDICAID

## 2022-07-31 ENCOUNTER — OUTPATIENT (OUTPATIENT)
Dept: OUTPATIENT SERVICES | Facility: HOSPITAL | Age: 37
LOS: 1 days | End: 2022-07-31
Payer: MEDICAID

## 2022-07-31 DIAGNOSIS — Z00.8 ENCOUNTER FOR OTHER GENERAL EXAMINATION: ICD-10-CM

## 2022-07-31 DIAGNOSIS — Z90.89 ACQUIRED ABSENCE OF OTHER ORGANS: Chronic | ICD-10-CM

## 2022-07-31 PROCEDURE — A9585: CPT

## 2022-07-31 PROCEDURE — 72157 MRI CHEST SPINE W/O & W/DYE: CPT

## 2022-07-31 PROCEDURE — 72156 MRI NECK SPINE W/O & W/DYE: CPT | Mod: 26

## 2022-07-31 PROCEDURE — 72157 MRI CHEST SPINE W/O & W/DYE: CPT | Mod: 26

## 2022-07-31 PROCEDURE — 72158 MRI LUMBAR SPINE W/O & W/DYE: CPT | Mod: 26

## 2022-07-31 PROCEDURE — 72158 MRI LUMBAR SPINE W/O & W/DYE: CPT

## 2022-07-31 PROCEDURE — 72156 MRI NECK SPINE W/O & W/DYE: CPT

## 2022-08-12 ENCOUNTER — APPOINTMENT (OUTPATIENT)
Dept: ORTHOPEDIC SURGERY | Facility: CLINIC | Age: 37
End: 2022-08-12

## 2022-08-12 VITALS
BODY MASS INDEX: 30.02 KG/M2 | WEIGHT: 159 LBS | HEIGHT: 61 IN | HEART RATE: 80 BPM | DIASTOLIC BLOOD PRESSURE: 83 MMHG | SYSTOLIC BLOOD PRESSURE: 118 MMHG

## 2022-08-12 DIAGNOSIS — D49.89 NEOPLASM OF UNSPECIFIED BEHAVIOR OF OTHER SPECIFIED SITES: ICD-10-CM

## 2022-08-12 DIAGNOSIS — D49.2 NEOPLASM OF UNSPECIFIED BEHAVIOR OF BONE, SOFT TISSUE, AND SKIN: ICD-10-CM

## 2022-08-12 PROCEDURE — 72040 X-RAY EXAM NECK SPINE 2-3 VW: CPT

## 2022-08-12 PROCEDURE — 99214 OFFICE O/P EST MOD 30 MIN: CPT

## 2022-08-12 NOTE — DISCUSSION/SUMMARY
[de-identified] : I would not recommend invasive surgical procedures such as cervical corpectomies at this point in time I believe the physical exam demonstrates and is consistent with more myositis type presentation.  I would recommend physical therapy for this as well as anti-inflammatories.  However patient is on alprazolam which has a toxic side effects with NSAIDs this would have to be cleared by her primary care physician first.  Physical therapy prescription has been provided patient will follow-up on an as-needed basis

## 2022-08-12 NOTE — PHYSICAL EXAM
[de-identified] : CONSTITUTIONAL:  Patient is a very pleasant individual who is well-nourished and appears stated age. \par PSYCHIATRIC:  Alert and oriented times three and in no apparent distress, and participates with orthopedic evaluation well.\par HEAD:  Atraumatic and  nonsyndromic in appearance.\par EENT: No thyromegaly, EOMI.\par RESPIRATORY:  Respiratory rate is regular, not dyspneic on examination.\par LYMPHATICS:  There is no cervical or axillary lymphadenopathy.\par INTEGUMENTARY:  Skin is clean, dry, and intact about the bilateral upper extremities and cervical spine. \par VASCULAR:   There is brisk capillary refill about the bilateral upper extremities and radial pulses are 2/4. \par NEUROLOGIC:  Negative L'hirmitte, negative Spurling's sign. There are no pathologic reflexes. There is no decrease in sensation of the bilateral upper extremities on Wartenberg pinwheel examination.  Deep tendon reflexes are well-maintained at +2/4 of the bilateral upper extremities and are symmetric.\par MUSCULOSKELETAL:  There is no visible muscular atrophy.  Manual motor strength is well maintained in the bilateral upper extremities.  Cervical range of motion is well maintained.  The patient ambulates in a non-myelopathic manner. Normal secondary orthopaedic exam of bilateral shoulders, elbows and hands.  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   MRI of the lumbar spine taken at [facility] on 10- demonstrates no significant compressive lesions, there are multiple osseous lesions in the lumbar spine possibly consistent with metastatic disease.   MRI of the cervical spine taken at an outside facility demonstrates a C3 metastatic lesion, no canal stenosis, no epidural involvement.   Cervical and thoracic CT scans from [facility] demonstrate multiple metastatic lesions.   Thoracic MRI from [facility] demonstrates metastatic lesion  PT scan done at [facility] in  demonstrates resolved focal uptake throughout the innumerable lytic lesions throughout the skeleton. Few areas of uptake correspond to new areas of sclerotic lesions, likely related to healing activity.  \par \par 2 views of the cervical spine have been reviewed on August 12, 2022 demonstrates compression abnormalities and metastatic lesions at third cervical vertebrae as well as the sixth cervical vertebra\par \par Cervical and thoracic MRIs have been reviewed from July 2022 from [facility] that shows multiple healed metastatic compression fractures.  Lumbar MRI from [facility] has also been reviewed from August 2022 demonstrating again stable lumbar mets

## 2022-08-13 NOTE — ADDENDUM
[FreeTextEntry1] : Documented by Juan Vitale acting as a scribe for Kaity Echols on 04/08/2022 . All medical record entries made by the Scribe were at my, Kaity Echols , direction and personally dictated by me on 04/08/2022 . I have reviewed the chart and agree that the record accurately reflects my personal performance of the history, physical exam, assessment and plan. I have also personally directed, reviewed, and agreed with the chart.

## 2022-08-13 NOTE — DISCUSSION/SUMMARY
[de-identified] : We talked about the nature of the condition and treatment options. Anticipatory guidance regarding mechanical oriented cervical spine pain and mechanically oriented lower back pain was given. A cervical, thoracic, and lumbar MRI has been ordered and is medically necessary to assess for metastasis of her stage 4, inoperable, breast cancer, osseous lesions visualized in 2021 advanced imaging, to assess the healing of lumbar and cervical compression fractures, C3 and C7 cervical fractures which must be evaluated for retropulsion, pathological reflexes appreciated today in the form of LE hyperreflexia and clonus, as well as ensuring the integrity of her spinal cord. MRI will help guide treatment plan, possible surgical intervention vs injection therapy with pain management. We will contact the patient once results are available. At next visit we will obtain an AP pelvis Xray. \par \par During this appointment the patient was examined and diagnoses, counseling, and coordination of care were discussed and explained in a face to face manner for a total time of 45 minutes. Prior to appointment and during encounter with patient extensive medical records were reviewed including but not limited to, hospital records, out patient records, imaging results, and lab data. In addition extensive time was spent reviewing aforementioned diagnostic studies. Counseling included abnormal image results, differential diagnoses, treatment options, risk and benefits, lifestyle changes, current condition, and current medications was performed. Patient's comments, questions, and concerns were address and patient verbalized understanding. Based on this patient's presentation at our office, which is an orthopedic spine surgeon's office, this patient inherently / intrinsically has a risk, however minute, of developing  issues such as Cauda equina syndrome, bowel and bladder changes, or progression of motor or neurological deficits such as paralysis which may be permanent. \par \par Patient with multiple medical comorbidity increasing the risk of perioperative and postoperative complications as well as diminished spine outcomes as per the current medical literature.  These include but are not limited to obesity, anxiety/depression, cardiac illness, kidney disease, peripheral vascular disease, history of cancer, COPD, dysmetabolic syndrome including but not limited to diabetes, hyperlipidemia, hypertension.  Patient is being referred back to primary care provider for medical optimization, as well as other appropriate specialists as needed for optimization prior to spine surgery.

## 2022-08-13 NOTE — PHYSICAL EXAM
[Obese] : obese [Poor Appearance] : well-appearing [Acute Distress] : not in acute distress [de-identified] : CONSTITUTIONAL: The patient is a very pleasant individual who is well-nourished and who appears stated age.\par PSYCHIATRIC: The patient is alert and oriented X 3 and in no apparent distress, and participates with orthopedic evaluation well.\par HEAD: Atraumatic and is nonsyndromic in appearance.\par EENT: No visible thyromegaly, EOMI.\par RESPIRATORY: Respiratory rate is regular, not dyspneic on examination.\par LYMPHATICS: There is no inguinal lymphadenopathy\par INTEGUMENTARY: Skin is clean, dry, and intact about the bilateral lower extremities and lumbar spine.\par VASCULAR: There is brisk capillary refill about the bilateral lower extremities.\par NEUROLOGIC: There are pathologic reflexes in the form of BL LE hyperreflexia and Clonus. There is no decrease in sensation of the bilateral lower extremities on Wartenberg pinwheel examination. \par MUSCULOSKELETAL: There is no visible muscular atrophy. Manual motor strength is well maintained in the bilateral lower extremities. The patient ambulates in a non-myelopathic manner. Negative tension sign and straight leg raise bilaterally. Quad extension, ankle dorsiflexion, EHL, plantar flexion, and ankle eversion are well preserved. Normal secondary orthopaedic exam of bilateral hips, greater trochanteric area, knees and ankles. No pain with internal or external rotation of the bilateral hips. Muscle sprain and strain characteristics. No Pain with palpation to the cervical and lumbar spine. Mechanically oriented lower back pain, mechanical oriented cervical spine pain.  [de-identified] : Xray of the Lumbar spine taken 10/05/2021 : AP projection shows pedicles are well visualized L1-L5, no rotoscoliosis, lateral projections show well maintained lumbar lordosis with no acute processes noted. \par \par MRI of the lumbar spine taken at [facility] on 10- demonstrates no significant compressive lesions, there are multiple osseous lesions in the lumbar spine possibly consistent with metastatic disease. \par \par MRI of the cervical spine taken at an outside facility demonstrates a C3 metastatic lesion, no canal stenosis, no epidural involvement. \par \par Cervical and thoracic CT scans from [facility] demonstrate multiple metastatic lesions. \par \par Thoracic MRI from [facility] demonstrates metastatic lesion\par \par PT scan done at [facility] in  demonstrates resolved focal uptake throughout the innumerable lytic lesions throughout the skeleton. Few areas of uptake correspond to new areas of sclerotic lesions, likely related to healing activity. \par \par

## 2022-08-13 NOTE — HISTORY OF PRESENT ILLNESS
[de-identified] : 36 year old F Presents for follow up evaluation of an acute exacerbation of chronic lower back pain and kyphoplasty discussion. No weakness complaints. Patient has a hx of metastatic stage 4 breast cancer, cancer was too advanced for surgerical approach. She has had radiation treatments x5 with Dr. Mohan. He oncologist is Dr. Chapa. Patient takes morphine ER TID, dilaudid orally prn breakthrough pain. She was recently hospitalized for stomach virus, leukopenia, and fever. \par \par  [Ataxia] : no ataxia [Incontinence] : no incontinence [Loss of Dexterity] : good dexterity [Urinary Ret.] : no urinary retention

## 2022-09-12 ENCOUNTER — EMERGENCY (EMERGENCY)
Facility: HOSPITAL | Age: 37
LOS: 1 days | Discharge: HOME | End: 2022-09-12
Attending: EMERGENCY MEDICINE
Payer: MEDICAID

## 2022-09-12 VITALS
WEIGHT: 169.98 LBS | HEIGHT: 61 IN | DIASTOLIC BLOOD PRESSURE: 70 MMHG | OXYGEN SATURATION: 93 % | SYSTOLIC BLOOD PRESSURE: 100 MMHG | RESPIRATION RATE: 18 BRPM | TEMPERATURE: 98 F | HEART RATE: 89 BPM

## 2022-09-12 DIAGNOSIS — Z90.89 ACQUIRED ABSENCE OF OTHER ORGANS: Chronic | ICD-10-CM

## 2022-09-12 LAB
ALBUMIN SERPL ELPH-MCNC: 3.5 G/DL — SIGNIFICANT CHANGE UP (ref 3.3–5.2)
ALP SERPL-CCNC: 95 U/L — SIGNIFICANT CHANGE UP (ref 40–120)
ALT FLD-CCNC: 23 U/L — SIGNIFICANT CHANGE UP
ANION GAP SERPL CALC-SCNC: 8 MMOL/L — SIGNIFICANT CHANGE UP (ref 5–17)
APPEARANCE UR: CLEAR — SIGNIFICANT CHANGE UP
AST SERPL-CCNC: 21 U/L — SIGNIFICANT CHANGE UP
BASOPHILS # BLD AUTO: 0.01 K/UL — SIGNIFICANT CHANGE UP (ref 0–0.2)
BASOPHILS NFR BLD AUTO: 0.2 % — SIGNIFICANT CHANGE UP (ref 0–2)
BILIRUB SERPL-MCNC: 0.3 MG/DL — LOW (ref 0.4–2)
BILIRUB UR-MCNC: NEGATIVE — SIGNIFICANT CHANGE UP
BUN SERPL-MCNC: 9.4 MG/DL — SIGNIFICANT CHANGE UP (ref 8–20)
CALCIUM SERPL-MCNC: 8.2 MG/DL — LOW (ref 8.4–10.5)
CHLORIDE SERPL-SCNC: 100 MMOL/L — SIGNIFICANT CHANGE UP (ref 98–107)
CO2 SERPL-SCNC: 28 MMOL/L — SIGNIFICANT CHANGE UP (ref 22–29)
COLOR SPEC: YELLOW — SIGNIFICANT CHANGE UP
CREAT SERPL-MCNC: 0.64 MG/DL — SIGNIFICANT CHANGE UP (ref 0.5–1.3)
DIFF PNL FLD: NEGATIVE — SIGNIFICANT CHANGE UP
EGFR: 118 ML/MIN/1.73M2 — SIGNIFICANT CHANGE UP
EOSINOPHIL # BLD AUTO: 0.06 K/UL — SIGNIFICANT CHANGE UP (ref 0–0.5)
EOSINOPHIL NFR BLD AUTO: 1.4 % — SIGNIFICANT CHANGE UP (ref 0–6)
EPI CELLS # UR: SIGNIFICANT CHANGE UP
GLUCOSE SERPL-MCNC: 86 MG/DL — SIGNIFICANT CHANGE UP (ref 70–99)
GLUCOSE UR QL: NEGATIVE MG/DL — SIGNIFICANT CHANGE UP
HCT VFR BLD CALC: 35.2 % — SIGNIFICANT CHANGE UP (ref 34.5–45)
HGB BLD-MCNC: 10.9 G/DL — LOW (ref 11.5–15.5)
IMM GRANULOCYTES NFR BLD AUTO: 0.5 % — SIGNIFICANT CHANGE UP (ref 0–1.5)
KETONES UR-MCNC: NEGATIVE — SIGNIFICANT CHANGE UP
LEUKOCYTE ESTERASE UR-ACNC: ABNORMAL
LYMPHOCYTES # BLD AUTO: 0.5 K/UL — LOW (ref 1–3.3)
LYMPHOCYTES # BLD AUTO: 11.5 % — LOW (ref 13–44)
MCHC RBC-ENTMCNC: 29.1 PG — SIGNIFICANT CHANGE UP (ref 27–34)
MCHC RBC-ENTMCNC: 31 GM/DL — LOW (ref 32–36)
MCV RBC AUTO: 93.9 FL — SIGNIFICANT CHANGE UP (ref 80–100)
MONOCYTES # BLD AUTO: 0.63 K/UL — SIGNIFICANT CHANGE UP (ref 0–0.9)
MONOCYTES NFR BLD AUTO: 14.5 % — HIGH (ref 2–14)
NEUTROPHILS # BLD AUTO: 3.11 K/UL — SIGNIFICANT CHANGE UP (ref 1.8–7.4)
NEUTROPHILS NFR BLD AUTO: 71.9 % — SIGNIFICANT CHANGE UP (ref 43–77)
NITRITE UR-MCNC: NEGATIVE — SIGNIFICANT CHANGE UP
PH UR: 6.5 — SIGNIFICANT CHANGE UP (ref 5–8)
PLATELET # BLD AUTO: 303 K/UL — SIGNIFICANT CHANGE UP (ref 150–400)
POTASSIUM SERPL-MCNC: 4.2 MMOL/L — SIGNIFICANT CHANGE UP (ref 3.5–5.3)
POTASSIUM SERPL-SCNC: 4.2 MMOL/L — SIGNIFICANT CHANGE UP (ref 3.5–5.3)
PROT SERPL-MCNC: 6.1 G/DL — LOW (ref 6.6–8.7)
PROT UR-MCNC: NEGATIVE — SIGNIFICANT CHANGE UP
RBC # BLD: 3.75 M/UL — LOW (ref 3.8–5.2)
RBC # FLD: 14.8 % — HIGH (ref 10.3–14.5)
RBC CASTS # UR COMP ASSIST: NEGATIVE /HPF — SIGNIFICANT CHANGE UP (ref 0–4)
SODIUM SERPL-SCNC: 136 MMOL/L — SIGNIFICANT CHANGE UP (ref 135–145)
SP GR SPEC: 1.01 — SIGNIFICANT CHANGE UP (ref 1.01–1.02)
UROBILINOGEN FLD QL: NEGATIVE MG/DL — SIGNIFICANT CHANGE UP
WBC # BLD: 4.33 K/UL — SIGNIFICANT CHANGE UP (ref 3.8–10.5)
WBC # FLD AUTO: 4.33 K/UL — SIGNIFICANT CHANGE UP (ref 3.8–10.5)
WBC UR QL: SIGNIFICANT CHANGE UP /HPF (ref 0–5)

## 2022-09-12 PROCEDURE — 70450 CT HEAD/BRAIN W/O DYE: CPT | Mod: MA

## 2022-09-12 PROCEDURE — 36415 COLL VENOUS BLD VENIPUNCTURE: CPT

## 2022-09-12 PROCEDURE — 85025 COMPLETE CBC W/AUTO DIFF WBC: CPT

## 2022-09-12 PROCEDURE — 80053 COMPREHEN METABOLIC PANEL: CPT

## 2022-09-12 PROCEDURE — 71045 X-RAY EXAM CHEST 1 VIEW: CPT | Mod: 26

## 2022-09-12 PROCEDURE — 99284 EMERGENCY DEPT VISIT MOD MDM: CPT

## 2022-09-12 PROCEDURE — 99285 EMERGENCY DEPT VISIT HI MDM: CPT

## 2022-09-12 PROCEDURE — 71045 X-RAY EXAM CHEST 1 VIEW: CPT

## 2022-09-12 PROCEDURE — 70450 CT HEAD/BRAIN W/O DYE: CPT | Mod: 26,MA

## 2022-09-12 PROCEDURE — 81001 URINALYSIS AUTO W/SCOPE: CPT

## 2022-09-12 NOTE — ED PROVIDER NOTE - OBJECTIVE STATEMENT
35 y/o female with PMHx of stage 4 breast cancer presents to ED for lethargy. Pt reports previously feeling well, reports taking a THC gummy as prescribed for pain and nausea. Pt states she usually only takes them at night. Pt was found to be somnolent, lethargic and difficult to arouse. Pt denies HA, blurry vision, weakness or tingling. No other complaints at this time.

## 2022-09-12 NOTE — ED PROVIDER NOTE - PATIENT PORTAL LINK FT
You can access the FollowMyHealth Patient Portal offered by [facility] by registering at the following website: [email or web address]. By joining [employer]’s FollowMyHealth portal, you will also be able to view your health information using other applications (apps) compatible with our system.

## 2022-09-12 NOTE — ED ADULT NURSE NOTE - OBJECTIVE STATEMENT
Patient felt stuck after eating an edible, pt has had them before but today felt very weak . Pt is feeling a little better now, pt has history of breast CA with mets

## 2022-09-12 NOTE — ED ADULT NURSE NOTE - NSIMPLEMENTINTERV_GEN_ALL_ED
Implemented All Fall with Harm Risk Interventions:  [place] to call system. Call bell, personal items and telephone within reach. Instruct patient to call for assistance. Room bathroom lighting operational. Non-slip footwear when patient is off stretcher. Physically safe environment: no spills, clutter or unnecessary equipment. Stretcher in lowest position, wheels locked, appropriate side rails in place. Provide visual cue, wrist band, yellow gown, etc. Monitor gait and stability. Monitor for mental status changes and reorient to person, place, and time. Review medications for side effects contributing to fall risk. Reinforce activity limits and safety measures with patient and family. Provide visual clues: red socks.

## 2022-09-12 NOTE — ED PROVIDER NOTE - CLINICAL SUMMARY MEDICAL DECISION MAKING FREE TEXT BOX
pt improved, back to baseline. NO sign of infectious, metabolic, or neurologic cause of the earlier lethargy. Stable for dc

## 2022-09-12 NOTE — ED ADULT TRIAGE NOTE - HEIGHT IN INCHES
Dollene Baumgarten  : 1947  [place]  Secondary: Bernardino Boone 76 at . [address]elaria [address]  [facility] [place] [address], [address] [facility]  [phone]   [phone]    Visit Count:  3   OUTPATIENT PHYSICAL THERAPY:  Daily Treatment Note  2019   ICD-10: Treatment Diagnosis: Pain in right shoulder [M25.511]  Bursitis of right shoulder [M75.51] Stiffness of Right Shoulder, Not elsewhere classified (M25.611)    Precautions/Allergies:   Patient has no known allergies. MD Orders: evaluate and treat  MEDICAL/REFERRING DIAGNOSIS:  Pain in right shoulder [M25.511]  Bursitis of right shoulder [M75.51]   DATE OF ONSET: 6+ mos  REFERRING PHYSICIAN: Nicolasa Gutierrez MD  RETURN PHYSICIAN APPOINTMENT: 6 weeks from IE       Pre-treatment Symptoms/Complaints: pt states his pain is no longer constant. Was a little stiff this morning when first woke up but no pain currently. Pain: Initial:   0/10 Post Session:  0/10   Medications Last Reviewed:  2019   Updated Objective Findings:  see initial evaluation      TREATMENT:     THERAPEUTIC EXERCISE: (10 minutes):  Exercises per grid below to improve mobility and strength. Required moderate visual, verbal and manual cues to promote proper body alignment, promote proper body posture and promote proper body mechanics. Progressed resistance, range and repetitions as indicated.    Date:  2019   Date:  19 Date:  19   Activity/Exercise Parameters Parameters Parameters   Education  POC, posture, HEP, use of ice 2-3/day     scap squeeze HEP X 10, 10 sec     scap depression  HEP X 4     t-band ER/IR NV YTB x 10 supine, elbows propped      Wand flexion   X 10  X 10    UBE  nv  nv   Flexion with ER B   YTB  X 10    rows   Nv   extensions   nv   ER/IR   nv       MANUAL THERAPY: (30 minutes): Joint mobilization and Soft tissue mobilization was utilized and necessary because of the patient's restricted joint motion, painful spasm, loss of articular motion and restricted motion of soft tissue. · Deep MFR and stretching to right pectorals  · Shoulder quadrant grade IV--  · Long distraction with oscillation   · Grade IV AP and inferior mobs to R gh joint and ac joint  ·   MODALITIES: (10 minutes): To decrease inflammation     ·   Ice application to right shoulder      [employer] Portal  Treatment/Session Summary:     · Response to Treatment:  Cuing needed during new exercises but pt demonstrated good understanding and independence  at end of session. Pain of right shoulder decreased to 1/10 at end of session. · Communication/Consultation:  initial eval sent to MD for review and signature   · Equipment provided today:  HEP handout      Recommendations/Intent for next treatment session: Next visit will focus on adding to HEP, manual techniques. begin T-band in standing       Treatment Plan of Care Effective Dates:  5/29/19 to 8/27/2019       Total Treatment Billable Duration:  40 min      Hernandez Damon, PT    Future Appointments   Date Time Provider Job Babcock   6/7/2019 11:15 AM Maryla Pack, PT [facility]   6/12/2019  1:45 PM Maryla Pack, PT [facility]   6/14/2019  9:00 AM Maryla Pack, PT [facility]   6/19/2019  8:00 AM Maryla Pack, PT [facility]   6/21/2019  8:00 AM Maryla Pack, PT [facility]   6/26/2019  8:00 AM Maryla Pack, PT [facility]   6/28/2019  8:00 AM Maryla Pack, PT [facility]   7/3/2019  8:00 AM Maryla Pack, PT [facility]   7/5/2019  8:00 AM Maryla Pack, PT [facility]   8/21/2019  8:15 AM Antoinette Betancourt DO [facility] [place] [facility]   8/28/2019  8:00 AM [facility]   9/4/2019  9:20 AM Brenda Yusuf MD [facility]   11/11/2019  9:15 AM J Carlos Soler MD [facility] SIM 1

## 2022-09-12 NOTE — ED ADULT NURSE NOTE - TEMPLATE
Quality 431: Preventive Care And Screening: Unhealthy Alcohol Use - Screening: Patient screened for unhealthy alcohol use using a single question and scores less than 2 times per year Quality 154 Part B: Falls: Risk Screening (Should Be Reported With Measure 155.): Patient screened for future fall risk; documentation of no falls in the past year or only one fall without injury in the past year Quality 226: Preventive Care And Screening: Tobacco Use: Screening And Cessation Intervention: Patient screened for tobacco use and is an ex/non-smoker Quality 155 (Denominator): Falls Plan Of Care: Plan of Care not Documented, Reason not Otherwise Specified Quality 154 Part A: Falls: Risk Assessment (Should Be Reported With Measure 155.): Falls risk assessment completed and documented in the past 12 months. Detail Level: Detailed Quality 47: Advance Care Plan: Advance care planning not documented, reason not otherwise specified. Quality 111:Pneumonia Vaccination Status For Older Adults: Pneumococcal Vaccination Previously Received Quality 110: Preventive Care And Screening: Influenza Immunization: Influenza Immunization previously received during influenza season General

## 2022-09-12 NOTE — ED ADULT TRIAGE NOTE - CHIEF COMPLAINT QUOTE
pts dad states dgtr tried a new Gummy, and she is totally out of it, took 1 at 230pm,   pt aware she is "out of it", dad states its a new edible and it may be too strong  A&Ox3, resp wnl

## 2022-09-13 ENCOUNTER — INPATIENT (INPATIENT)
Facility: HOSPITAL | Age: 37
LOS: 2 days | Discharge: HOME | DRG: 189 | End: 2022-09-16
Attending: INTERNAL MEDICINE | Admitting: INTERNAL MEDICINE
Payer: MEDICAID

## 2022-09-13 VITALS
RESPIRATION RATE: 20 BRPM | HEIGHT: 61 IN | HEART RATE: 125 BPM | WEIGHT: 173.94 LBS | SYSTOLIC BLOOD PRESSURE: 118 MMHG | TEMPERATURE: 98 F | OXYGEN SATURATION: 98 % | DIASTOLIC BLOOD PRESSURE: 79 MMHG

## 2022-09-13 DIAGNOSIS — Z90.89 ACQUIRED ABSENCE OF OTHER ORGANS: Chronic | ICD-10-CM

## 2022-09-13 LAB
ALBUMIN SERPL ELPH-MCNC: 3.7 G/DL — SIGNIFICANT CHANGE UP (ref 3.3–5.2)
ALP SERPL-CCNC: 99 U/L — SIGNIFICANT CHANGE UP (ref 40–120)
ALT FLD-CCNC: 28 U/L — SIGNIFICANT CHANGE UP
ANION GAP SERPL CALC-SCNC: 9 MMOL/L — SIGNIFICANT CHANGE UP (ref 5–17)
APTT BLD: 37 SEC — HIGH (ref 27.5–35.5)
AST SERPL-CCNC: 29 U/L — SIGNIFICANT CHANGE UP
BASOPHILS # BLD AUTO: 0.02 K/UL — SIGNIFICANT CHANGE UP (ref 0–0.2)
BASOPHILS NFR BLD AUTO: 0.5 % — SIGNIFICANT CHANGE UP (ref 0–2)
BILIRUB SERPL-MCNC: 0.3 MG/DL — LOW (ref 0.4–2)
BUN SERPL-MCNC: 7.5 MG/DL — LOW (ref 8–20)
CALCIUM SERPL-MCNC: 8.1 MG/DL — LOW (ref 8.4–10.5)
CHLORIDE SERPL-SCNC: 102 MMOL/L — SIGNIFICANT CHANGE UP (ref 98–107)
CO2 SERPL-SCNC: 26 MMOL/L — SIGNIFICANT CHANGE UP (ref 22–29)
CREAT SERPL-MCNC: 0.6 MG/DL — SIGNIFICANT CHANGE UP (ref 0.5–1.3)
EGFR: 120 ML/MIN/1.73M2 — SIGNIFICANT CHANGE UP
EOSINOPHIL # BLD AUTO: 0.04 K/UL — SIGNIFICANT CHANGE UP (ref 0–0.5)
EOSINOPHIL NFR BLD AUTO: 1.1 % — SIGNIFICANT CHANGE UP (ref 0–6)
GLUCOSE SERPL-MCNC: 86 MG/DL — SIGNIFICANT CHANGE UP (ref 70–99)
HCT VFR BLD CALC: 36 % — SIGNIFICANT CHANGE UP (ref 34.5–45)
HGB BLD-MCNC: 11.4 G/DL — LOW (ref 11.5–15.5)
IMM GRANULOCYTES NFR BLD AUTO: 0.5 % — SIGNIFICANT CHANGE UP (ref 0–1.5)
INR BLD: 1.16 RATIO — SIGNIFICANT CHANGE UP (ref 0.88–1.16)
LACTATE BLDV-MCNC: 0.6 MMOL/L — SIGNIFICANT CHANGE UP (ref 0.5–2)
LYMPHOCYTES # BLD AUTO: 0.52 K/UL — LOW (ref 1–3.3)
LYMPHOCYTES # BLD AUTO: 13.8 % — SIGNIFICANT CHANGE UP (ref 13–44)
MCHC RBC-ENTMCNC: 29.6 PG — SIGNIFICANT CHANGE UP (ref 27–34)
MCHC RBC-ENTMCNC: 31.7 GM/DL — LOW (ref 32–36)
MCV RBC AUTO: 93.5 FL — SIGNIFICANT CHANGE UP (ref 80–100)
MONOCYTES # BLD AUTO: 0.62 K/UL — SIGNIFICANT CHANGE UP (ref 0–0.9)
MONOCYTES NFR BLD AUTO: 16.4 % — HIGH (ref 2–14)
NEUTROPHILS # BLD AUTO: 2.55 K/UL — SIGNIFICANT CHANGE UP (ref 1.8–7.4)
NEUTROPHILS NFR BLD AUTO: 67.7 % — SIGNIFICANT CHANGE UP (ref 43–77)
PLATELET # BLD AUTO: 344 K/UL — SIGNIFICANT CHANGE UP (ref 150–400)
POTASSIUM SERPL-MCNC: 4.3 MMOL/L — SIGNIFICANT CHANGE UP (ref 3.5–5.3)
POTASSIUM SERPL-SCNC: 4.3 MMOL/L — SIGNIFICANT CHANGE UP (ref 3.5–5.3)
PROT SERPL-MCNC: 6.5 G/DL — LOW (ref 6.6–8.7)
PROTHROM AB SERPL-ACNC: 13.5 SEC — HIGH (ref 10.5–13.4)
RAPID RVP RESULT: DETECTED
RBC # BLD: 3.85 M/UL — SIGNIFICANT CHANGE UP (ref 3.8–5.2)
RBC # FLD: 14.8 % — HIGH (ref 10.3–14.5)
RV+EV RNA SPEC QL NAA+PROBE: DETECTED
SARS-COV-2 RNA SPEC QL NAA+PROBE: SIGNIFICANT CHANGE UP
SODIUM SERPL-SCNC: 137 MMOL/L — SIGNIFICANT CHANGE UP (ref 135–145)
WBC # BLD: 3.77 K/UL — LOW (ref 3.8–10.5)
WBC # FLD AUTO: 3.77 K/UL — LOW (ref 3.8–10.5)

## 2022-09-13 PROCEDURE — 99285 EMERGENCY DEPT VISIT HI MDM: CPT

## 2022-09-13 PROCEDURE — 71275 CT ANGIOGRAPHY CHEST: CPT | Mod: 26,MA

## 2022-09-13 PROCEDURE — 93010 ELECTROCARDIOGRAM REPORT: CPT

## 2022-09-13 RX ORDER — SODIUM CHLORIDE 9 MG/ML
1000 INJECTION INTRAMUSCULAR; INTRAVENOUS; SUBCUTANEOUS ONCE
Refills: 0 | Status: COMPLETED | OUTPATIENT
Start: 2022-09-13 | End: 2022-09-13

## 2022-09-13 RX ORDER — OXYCODONE HYDROCHLORIDE 5 MG/1
5 TABLET ORAL ONCE
Refills: 0 | Status: DISCONTINUED | OUTPATIENT
Start: 2022-09-13 | End: 2022-09-13

## 2022-09-13 RX ORDER — MORPHINE SULFATE 50 MG/1
4 CAPSULE, EXTENDED RELEASE ORAL ONCE
Refills: 0 | Status: DISCONTINUED | OUTPATIENT
Start: 2022-09-13 | End: 2022-09-13

## 2022-09-13 RX ORDER — ONDANSETRON 8 MG/1
4 TABLET, FILM COATED ORAL ONCE
Refills: 0 | Status: COMPLETED | OUTPATIENT
Start: 2022-09-13 | End: 2022-09-13

## 2022-09-13 RX ADMIN — SODIUM CHLORIDE 1000 MILLILITER(S): 9 INJECTION INTRAMUSCULAR; INTRAVENOUS; SUBCUTANEOUS at 18:37

## 2022-09-13 RX ADMIN — MORPHINE SULFATE 4 MILLIGRAM(S): 50 CAPSULE, EXTENDED RELEASE ORAL at 19:09

## 2022-09-13 RX ADMIN — ONDANSETRON 4 MILLIGRAM(S): 8 TABLET, FILM COATED ORAL at 20:19

## 2022-09-13 NOTE — ED PROVIDER NOTE - NS ED ATTENDING STATEMENT MOD
I have seen and examined this patient and fully participated in the care of this patient as the teaching attending.  The service was shared with the MAGO.  I reviewed and verified the documentation and independently performed the documented: Attending with

## 2022-09-13 NOTE — ED PROVIDER NOTE - PHYSICAL EXAMINATION
Gen: well appearing, wearing large sweatshirt, under multiple blankets - complaining of feeling cold   Head: normocephalic, atraumatic  EENT: EOMI, PERRLA, neck supple, moist mucous membranes, no scleral icterus  Lung: no increased work of breathing, clear to auscultation bilaterally, no wheezing, rales, rhonchi, speaking in full sentences  CV: tachycardic rate, regular rhythm, normal s1/s2, 2+ radial pulses bilaterally  Abd: soft, non-tender, non-distended, no rebound tenderness or guarding; no CVA tenderness  MSK: No edema, no visible deformities, full range of motion in all 4 extremities  Neuro: Awake, alert, no focal neurologic deficits  Skin: No rash, no lesions, no jaundice  Psych: normal affect, normal speech

## 2022-09-13 NOTE — ED PROVIDER NOTE - ATTENDING CONTRIBUTION TO CARE
37 yo female with pmhx stage 4 metastatic breast cancer, c/o generalized weakness, body aches and feeling out it. reported to triage rn, feel sob, but currently denies sob. denies chest pain  patient states she was seen in ED for feeling out of it, and taking a new thc gummy. patient was observed in ED, and then felt better. today, patient slept most of day, and when she woke up, she didn't feel right.   patient went to her palliative doctor, and was found to be hypoxic and tachycardic  denies fever  patient aaox3  cta b/l, tachycardic  abd soft, non tender  no pedal edema    concern for PE  will repeat labs, obtain ct chest

## 2022-09-13 NOTE — ED PROVIDER NOTE - OBJECTIVE STATEMENT
36 year old female with PMHx stage 4 breast cancer with metastasis to brain, lungs, liver, lymph nodes sent by palliative care physician for possible clot. Patient states was seen here in ED yesterday with AMS presumed to be secondary to marijuana gummy, improved upon discharge. States this morning, she woke up and felt "out of it", worse than upon discharge last night. Reports lethargy throughout the day, slept for most of the day which is abnormal for her, increased general body pain, and chills. States her pulse ox in physician's office today was reading in the 70-80s. Denies any fever, cough, rhinorrhea, dysuria, hematuria, chest pain, difficulty breathing, leg pain.

## 2022-09-13 NOTE — ED PROVIDER NOTE - PROGRESS NOTE DETAILS
patient tolerating po  awaiting ct  -md fransico JK - CT without evidence of PE; patient found to be enterovirus positive. CT showing Intralobular septal thickening throughout both lungs with patchy   groundglass and nodular opacities which may represent combination of   pulmonary edema and lymphangitic spread; Small left pleural effusion   extending along the left major fissure likely from metastasis. Patient still hypoxic on RA to 85%, improved to 98% on 3L NC. Palliative consulted. Patient on active chemo and just finished 10 rounds of radiation. Ready and agreeable to admission for further monitoring.

## 2022-09-13 NOTE — ED PROVIDER NOTE - NS ED ROS FT
Gen: No fever, (+) chills, (+) lethargy  Eyes: No eye irritation or discharge  ENT: No ear pain, no congestion, no rhinorrhea, no sore throat  Resp: No cough, no trouble breathing  Cardiovascular: No chest pain, no palpitation  Gastrointestinal: No nausea, no vomiting, no diarrhea, no constipation  :  No change in urine output; no dysuria, no hematuria  MS: No joint or muscle pain  Skin: No rashes  Neuro: No headache; no abnormal movements  Remainder negative, except as per the HPI

## 2022-09-13 NOTE — ED PROVIDER NOTE - CLINICAL SUMMARY MEDICAL DECISION MAKING FREE TEXT BOX
37 yo female with hx metastatic cancer with weakness, with hypoxia and tachycardia  patient speaking in full sentences  will check labs, cta chest ,

## 2022-09-14 DIAGNOSIS — R09.02 HYPOXEMIA: ICD-10-CM

## 2022-09-14 DIAGNOSIS — Z98.890 OTHER SPECIFIED POSTPROCEDURAL STATES: Chronic | ICD-10-CM

## 2022-09-14 LAB
ANION GAP SERPL CALC-SCNC: 9 MMOL/L — SIGNIFICANT CHANGE UP (ref 5–17)
ANISOCYTOSIS BLD QL: SLIGHT — SIGNIFICANT CHANGE UP
BASO STIPL BLD QL SMEAR: PRESENT — SIGNIFICANT CHANGE UP
BASOPHILS # BLD AUTO: 0 K/UL — SIGNIFICANT CHANGE UP (ref 0–0.2)
BASOPHILS NFR BLD AUTO: 0 % — SIGNIFICANT CHANGE UP (ref 0–2)
BUN SERPL-MCNC: 5.8 MG/DL — LOW (ref 8–20)
CALCIUM SERPL-MCNC: 7.8 MG/DL — LOW (ref 8.4–10.5)
CHLORIDE SERPL-SCNC: 102 MMOL/L — SIGNIFICANT CHANGE UP (ref 98–107)
CO2 SERPL-SCNC: 25 MMOL/L — SIGNIFICANT CHANGE UP (ref 22–29)
CREAT SERPL-MCNC: 0.5 MG/DL — SIGNIFICANT CHANGE UP (ref 0.5–1.3)
DACRYOCYTES BLD QL SMEAR: SLIGHT — SIGNIFICANT CHANGE UP
EGFR: 125 ML/MIN/1.73M2 — SIGNIFICANT CHANGE UP
EOSINOPHIL # BLD AUTO: 0 K/UL — SIGNIFICANT CHANGE UP (ref 0–0.5)
EOSINOPHIL NFR BLD AUTO: 0 % — SIGNIFICANT CHANGE UP (ref 0–6)
GIANT PLATELETS BLD QL SMEAR: PRESENT — SIGNIFICANT CHANGE UP
GLUCOSE SERPL-MCNC: 90 MG/DL — SIGNIFICANT CHANGE UP (ref 70–99)
HCT VFR BLD CALC: 33.1 % — LOW (ref 34.5–45)
HGB BLD-MCNC: 10.6 G/DL — LOW (ref 11.5–15.5)
LYMPHOCYTES # BLD AUTO: 0.16 K/UL — LOW (ref 1–3.3)
LYMPHOCYTES # BLD AUTO: 4.3 % — LOW (ref 13–44)
MACROCYTES BLD QL: SLIGHT — SIGNIFICANT CHANGE UP
MAGNESIUM SERPL-MCNC: 2 MG/DL — SIGNIFICANT CHANGE UP (ref 1.6–2.6)
MANUAL SMEAR VERIFICATION: SIGNIFICANT CHANGE UP
MCHC RBC-ENTMCNC: 29.8 PG — SIGNIFICANT CHANGE UP (ref 27–34)
MCHC RBC-ENTMCNC: 32 GM/DL — SIGNIFICANT CHANGE UP (ref 32–36)
MCV RBC AUTO: 93 FL — SIGNIFICANT CHANGE UP (ref 80–100)
MONOCYTES # BLD AUTO: 0.35 K/UL — SIGNIFICANT CHANGE UP (ref 0–0.9)
MONOCYTES NFR BLD AUTO: 9.6 % — SIGNIFICANT CHANGE UP (ref 2–14)
NEUTROPHILS # BLD AUTO: 3.16 K/UL — SIGNIFICANT CHANGE UP (ref 1.8–7.4)
NEUTROPHILS NFR BLD AUTO: 86.1 % — HIGH (ref 43–77)
PHOSPHATE SERPL-MCNC: 2.3 MG/DL — LOW (ref 2.4–4.7)
PLAT MORPH BLD: NORMAL — SIGNIFICANT CHANGE UP
PLATELET # BLD AUTO: 369 K/UL — SIGNIFICANT CHANGE UP (ref 150–400)
POIKILOCYTOSIS BLD QL AUTO: SLIGHT — SIGNIFICANT CHANGE UP
POLYCHROMASIA BLD QL SMEAR: SLIGHT — SIGNIFICANT CHANGE UP
POTASSIUM SERPL-MCNC: 4.3 MMOL/L — SIGNIFICANT CHANGE UP (ref 3.5–5.3)
POTASSIUM SERPL-SCNC: 4.3 MMOL/L — SIGNIFICANT CHANGE UP (ref 3.5–5.3)
PROCALCITONIN SERPL-MCNC: 0.08 NG/ML — SIGNIFICANT CHANGE UP (ref 0.02–0.1)
RBC # BLD: 3.56 M/UL — LOW (ref 3.8–5.2)
RBC # FLD: 14.5 % — SIGNIFICANT CHANGE UP (ref 10.3–14.5)
RBC BLD AUTO: ABNORMAL
SODIUM SERPL-SCNC: 136 MMOL/L — SIGNIFICANT CHANGE UP (ref 135–145)
WBC # BLD: 3.67 K/UL — LOW (ref 3.8–10.5)
WBC # FLD AUTO: 3.67 K/UL — LOW (ref 3.8–10.5)

## 2022-09-14 PROCEDURE — 12345: CPT | Mod: NC

## 2022-09-14 PROCEDURE — 99223 1ST HOSP IP/OBS HIGH 75: CPT

## 2022-09-14 RX ORDER — HYDROMORPHONE HYDROCHLORIDE 2 MG/ML
1 INJECTION INTRAMUSCULAR; INTRAVENOUS; SUBCUTANEOUS
Qty: 0 | Refills: 0 | DISCHARGE

## 2022-09-14 RX ORDER — MODAFINIL 200 MG/1
1 TABLET ORAL
Qty: 0 | Refills: 0 | DISCHARGE

## 2022-09-14 RX ORDER — ONDANSETRON 8 MG/1
4 TABLET, FILM COATED ORAL EVERY 6 HOURS
Refills: 0 | Status: DISCONTINUED | OUTPATIENT
Start: 2022-09-14 | End: 2022-09-16

## 2022-09-14 RX ORDER — HYDROMORPHONE HYDROCHLORIDE 2 MG/ML
0.5 INJECTION INTRAMUSCULAR; INTRAVENOUS; SUBCUTANEOUS
Refills: 0 | Status: DISCONTINUED | OUTPATIENT
Start: 2022-09-14 | End: 2022-09-15

## 2022-09-14 RX ORDER — IPRATROPIUM BROMIDE 0.2 MG/ML
500 SOLUTION, NON-ORAL INHALATION EVERY 6 HOURS
Refills: 0 | Status: DISCONTINUED | OUTPATIENT
Start: 2022-09-14 | End: 2022-09-16

## 2022-09-14 RX ORDER — HYDROMORPHONE HYDROCHLORIDE 2 MG/ML
1 INJECTION INTRAMUSCULAR; INTRAVENOUS; SUBCUTANEOUS EVERY 4 HOURS
Refills: 0 | Status: DISCONTINUED | OUTPATIENT
Start: 2022-09-14 | End: 2022-09-15

## 2022-09-14 RX ORDER — CARISOPRODOL 250 MG
1 TABLET ORAL
Qty: 0 | Refills: 0 | DISCHARGE

## 2022-09-14 RX ORDER — ENOXAPARIN SODIUM 100 MG/ML
40 INJECTION SUBCUTANEOUS EVERY 24 HOURS
Refills: 0 | Status: DISCONTINUED | OUTPATIENT
Start: 2022-09-14 | End: 2022-09-16

## 2022-09-14 RX ORDER — ALBUTEROL 90 UG/1
3 AEROSOL, METERED ORAL
Qty: 0 | Refills: 0 | DISCHARGE

## 2022-09-14 RX ORDER — METOCLOPRAMIDE HCL 10 MG
10 TABLET ORAL EVERY 8 HOURS
Refills: 0 | Status: DISCONTINUED | OUTPATIENT
Start: 2022-09-14 | End: 2022-09-16

## 2022-09-14 RX ORDER — IPRATROPIUM/ALBUTEROL SULFATE 18-103MCG
3 AEROSOL WITH ADAPTER (GRAM) INHALATION ONCE
Refills: 0 | Status: COMPLETED | OUTPATIENT
Start: 2022-09-14 | End: 2022-09-14

## 2022-09-14 RX ORDER — PANTOPRAZOLE SODIUM 20 MG/1
40 TABLET, DELAYED RELEASE ORAL DAILY
Refills: 0 | Status: DISCONTINUED | OUTPATIENT
Start: 2022-09-14 | End: 2022-09-16

## 2022-09-14 RX ORDER — HYDROMORPHONE HYDROCHLORIDE 2 MG/ML
0.5 INJECTION INTRAMUSCULAR; INTRAVENOUS; SUBCUTANEOUS ONCE
Refills: 0 | Status: DISCONTINUED | OUTPATIENT
Start: 2022-09-14 | End: 2022-09-14

## 2022-09-14 RX ORDER — MORPHINE SULFATE 50 MG/1
1 CAPSULE, EXTENDED RELEASE ORAL
Qty: 0 | Refills: 0 | DISCHARGE

## 2022-09-14 RX ORDER — ESCITALOPRAM OXALATE 10 MG/1
1 TABLET, FILM COATED ORAL
Qty: 0 | Refills: 0 | DISCHARGE

## 2022-09-14 RX ORDER — MORPHINE SULFATE 50 MG/1
4 CAPSULE, EXTENDED RELEASE ORAL ONCE
Refills: 0 | Status: DISCONTINUED | OUTPATIENT
Start: 2022-09-14 | End: 2022-09-14

## 2022-09-14 RX ORDER — DIAZEPAM 5 MG
2 TABLET ORAL EVERY 8 HOURS
Refills: 0 | Status: DISCONTINUED | OUTPATIENT
Start: 2022-09-14 | End: 2022-09-14

## 2022-09-14 RX ORDER — DIAZEPAM 5 MG
5 TABLET ORAL EVERY 8 HOURS
Refills: 0 | Status: DISCONTINUED | OUTPATIENT
Start: 2022-09-14 | End: 2022-09-16

## 2022-09-14 RX ORDER — ALPRAZOLAM 0.25 MG
1 TABLET ORAL
Qty: 0 | Refills: 0 | DISCHARGE

## 2022-09-14 RX ADMIN — PANTOPRAZOLE SODIUM 40 MILLIGRAM(S): 20 TABLET, DELAYED RELEASE ORAL at 13:51

## 2022-09-14 RX ADMIN — MORPHINE SULFATE 4 MILLIGRAM(S): 50 CAPSULE, EXTENDED RELEASE ORAL at 01:40

## 2022-09-14 RX ADMIN — Medication 40 MILLIGRAM(S): at 13:50

## 2022-09-14 RX ADMIN — HYDROMORPHONE HYDROCHLORIDE 1 MILLIGRAM(S): 2 INJECTION INTRAMUSCULAR; INTRAVENOUS; SUBCUTANEOUS at 16:54

## 2022-09-14 RX ADMIN — HYDROMORPHONE HYDROCHLORIDE 1 MILLIGRAM(S): 2 INJECTION INTRAMUSCULAR; INTRAVENOUS; SUBCUTANEOUS at 21:15

## 2022-09-14 RX ADMIN — HYDROMORPHONE HYDROCHLORIDE 1 MILLIGRAM(S): 2 INJECTION INTRAMUSCULAR; INTRAVENOUS; SUBCUTANEOUS at 17:05

## 2022-09-14 RX ADMIN — HYDROMORPHONE HYDROCHLORIDE 1 MILLIGRAM(S): 2 INJECTION INTRAMUSCULAR; INTRAVENOUS; SUBCUTANEOUS at 10:44

## 2022-09-14 RX ADMIN — HYDROMORPHONE HYDROCHLORIDE 0.5 MILLIGRAM(S): 2 INJECTION INTRAMUSCULAR; INTRAVENOUS; SUBCUTANEOUS at 13:31

## 2022-09-14 RX ADMIN — Medication 3 MILLILITER(S): at 00:43

## 2022-09-14 RX ADMIN — Medication 40 MILLIGRAM(S): at 05:08

## 2022-09-14 RX ADMIN — ENOXAPARIN SODIUM 40 MILLIGRAM(S): 100 INJECTION SUBCUTANEOUS at 05:08

## 2022-09-14 RX ADMIN — HYDROMORPHONE HYDROCHLORIDE 0.5 MILLIGRAM(S): 2 INJECTION INTRAMUSCULAR; INTRAVENOUS; SUBCUTANEOUS at 13:45

## 2022-09-14 RX ADMIN — ONDANSETRON 4 MILLIGRAM(S): 8 TABLET, FILM COATED ORAL at 16:54

## 2022-09-14 RX ADMIN — ONDANSETRON 4 MILLIGRAM(S): 8 TABLET, FILM COATED ORAL at 09:45

## 2022-09-14 RX ADMIN — Medication 40 MILLIGRAM(S): at 23:02

## 2022-09-14 RX ADMIN — Medication 10 MILLIGRAM(S): at 21:18

## 2022-09-14 RX ADMIN — Medication 10 MILLIGRAM(S): at 13:51

## 2022-09-14 RX ADMIN — HYDROMORPHONE HYDROCHLORIDE 0.5 MILLIGRAM(S): 2 INJECTION INTRAMUSCULAR; INTRAVENOUS; SUBCUTANEOUS at 18:35

## 2022-09-14 RX ADMIN — HYDROMORPHONE HYDROCHLORIDE 1 MILLIGRAM(S): 2 INJECTION INTRAMUSCULAR; INTRAVENOUS; SUBCUTANEOUS at 05:08

## 2022-09-14 RX ADMIN — Medication 10 MILLIGRAM(S): at 05:08

## 2022-09-14 RX ADMIN — HYDROMORPHONE HYDROCHLORIDE 0.5 MILLIGRAM(S): 2 INJECTION INTRAMUSCULAR; INTRAVENOUS; SUBCUTANEOUS at 18:18

## 2022-09-14 RX ADMIN — HYDROMORPHONE HYDROCHLORIDE 1 MILLIGRAM(S): 2 INJECTION INTRAMUSCULAR; INTRAVENOUS; SUBCUTANEOUS at 09:44

## 2022-09-14 RX ADMIN — HYDROMORPHONE HYDROCHLORIDE 1 MILLIGRAM(S): 2 INJECTION INTRAMUSCULAR; INTRAVENOUS; SUBCUTANEOUS at 21:40

## 2022-09-14 NOTE — H&P ADULT - NSICDXPASTMEDICALHX_GEN_ALL_CORE_FT
PAST MEDICAL HISTORY:  Anxiety     H/O compression fracture of spine     H/O pleural effusion     Metastatic breast cancer     Pericardial effusion

## 2022-09-14 NOTE — H&P ADULT - NSICDXPASTSURGICALHX_GEN_ALL_CORE_FT
PAST SURGICAL HISTORY:  H/O chest tube placement 12/23/21    S/P pericardiocentesis 12/28/21    S/P tonsillectomy

## 2022-09-14 NOTE — H&P ADULT - HISTORY OF PRESENT ILLNESS
36yoF hx metastatic breast cancer with mets to brain, lung, liver and bone, on active chemotherapy, s/p recent completion of cranial radiation, also hx of malignant pleural effusion s/p chest tube (12/23/21), pericardial effusion with tamponade s/p pericardiocentesis (12/28/21), pathologic compression fractures, anxiety disorder who was sent from outpatient doctor for hypoxia.   at bedside and providing supplemental hx.  Pt was previously on supplemental O2 with NC when she was diagnosed with pleural effusion back in 12/2021 but had been gradually weaned off and haven’t used it in a few months.  Pt was seen at [facility] yesterday for altered mental status in setting of medical marijuana usage and was discharged after normal CT head findings and clinical improvement back to her baseline. Pt reports feeling fatigued for the past day with generalized body pain and went to see one of her outpatient doctors who found her to be hypoxic with O2 sat in 70-80s and advised her to seek hospital evaluation.  Pt denies any cough, dyspnea, orthopnea, leg swelling, chest pain.  On admission, pt remained hypoxic and was placed on NC with improvement. She was also found to be entero/rhinovirus positive.

## 2022-09-14 NOTE — H&P ADULT - NSHPPHYSICALEXAM_GEN_ALL_CORE
Vital Signs Last 24 Hrs  T(C): 37.1 (14 Sep 2022 02:03), Max: 37.2 (13 Sep 2022 23:50)  T(F): 98.8 (14 Sep 2022 02:03), Max: 98.9 (13 Sep 2022 23:50)  HR: 101 (14 Sep 2022 02:03) (101 - 125)  BP: 104/71 (14 Sep 2022 02:03) (102/70 - 118/79)  BP(mean): --  RR: 20 (14 Sep 2022 02:03) (20 - 20)  SpO2: 97% (14 Sep 2022 02:03) (96% - 98%)    Parameters below as of 14 Sep 2022 02:03  Patient On (Oxygen Delivery Method): nasal cannula  O2 Flow (L/min): 3    GENERAL:  Tired appearing young female, actively vomiting at times  EYES:  Clear conjunctiva, extraocular movement intact  ENT: Moist mucous membranes  RESP:  Non-labored breathing pattern, lungs clear to ausculation in anterior fields, no wheezing appreciated   CV: Tachycardia, no murmurs appreciated, no lower extremity edema  GI: Soft, non-tender, non-distended  NEURO: Awake, alert, conversant, able to move UE and LE against gravity   SKIN: No rash or lesions, warm and dry

## 2022-09-14 NOTE — H&P ADULT - NSHPLABSRESULTS_GEN_ALL_CORE
09-13    137  |  102  |  7.5<L>  ----------------------------<  86  4.3   |  26.0  |  0.60    Ca    8.1<L>      13 Sep 2022 18:15    TPro  6.5<L>  /  Alb  3.7  /  TBili  0.3<L>  /  DBili  x   /  AST  29  /  ALT  28  /  AlkPhos  99  09-13                            11.4   3.77  )-----------( 344      ( 13 Sep 2022 18:15 )             36.0

## 2022-09-14 NOTE — PROGRESS NOTE ADULT - ASSESSMENT
The patient is a 36 year old female with a past medical history of metastatic breast cancer with mets to brain, lung, liver and bone, on active chemotherapy, s/p recent completion of cranial radiation, also hx of malignant pleural effusion s/p chest tube (12/23/21), pericardial effusion with tamponade s/p pericardiocentesis (12/28/21), pathologic compression fractures, anxiety disorder who was sent from outpatient doctor for hypoxia    Assessment/Plan;    1. Acute hypoxemic respiratory failure   -Suspect multifactorial from lymphangitic spread and active enterovirus infection  -CT chest shows small L-pleural effusion, intralobular septal thickening with patchy groundglass and nodular opacities suggestive of pulmonary edema vs. lymphangitic spread  -Less suspicion for pulmonary edema (prior TTE WNL, no leg edema)  -Trial of IV steroids for hypoxia from possible bronchospasm due to enterovirus infection  -Ipratropium PRN (held off on albuterol due to tachycardia)  -On 3L nasal cannula with improvement  -Wean supplemental O2 as tolerated  - monitoring    2. Tachycardia  -EKG shows sinus tachycardia with   -CTA negative for PE  -Suspect multifactorial from acute hypoxemic respiratory failure, nausea and vomiting, malignancy   -Telemetry and management of above disease processes    Enterovirus infection  -No active dyspnea or cough, only hypoxic  -Supportive care as needed  -Can add anti-tussives PRN when pt able to tolerate PO    Nausea and vomiting  -Acute on chronic, suspect chemotherapy related nausea and vomiting  -Pt actively vomiting during encounter, states cannot take pills  -Strict NPO for now   -Advance diet when symptoms improve  -Aspiration precautions  -IV Zofran and Reglan PRN  -On IV valium PRN which can be used for refractory nausea and vomiting    Hx metastatic breast cancer   -On methadone TID and dilaudid PRN but currently unable to tolerate pills   -Start IV dilaudid 0.5mg and 1mg PRN moderate and severe pain  -Holding pregabalin 200mg BID and memantine 10mg BID while NPO  -[facility] consulted    Hx pericardial effusion  -Holding colchicine 0.6mg BID while NPO    Hx anxiety    -Holding fluoxetine 80mg daily while NPO  -On PO valium PRN, start IV valium 5mg q8hr PRN while NPO    Prophylactic measure  -Lovenox, high risk given breast malignancy  -Hx of brain mets noted, now s/p steroids and RT with interval improvement (none seen on recent CT head), also less propensity for spontaneous ICH from brain metastasis due to breast cancer       The patient is a 36 year old female with a past medical history of metastatic breast cancer with mets to brain, lung, liver and bone, on active chemotherapy, s/p recent completion of cranial radiation, also hx of malignant pleural effusion s/p chest tube (12/23/21), pericardial effusion with tamponade s/p pericardiocentesis (12/28/21), pathologic compression fractures, anxiety disorder who was sent from outpatient doctor for hypoxia    Assessment/Plan;    1. Acute hypoxemic respiratory failure   -Suspect multifactorial from lymphangitic spread and active enterovirus infection  -CT chest shows small L-pleural effusion, intralobular septal thickening with patchy groundglass and nodular opacities suggestive of pulmonary edema vs. lymphangitic spread  -Less suspicion for pulmonary edema (prior TTE WNL, no leg edema)  -Trial of IV steroids for hypoxia from possible bronchospasm due to enterovirus infection  -Ipratropium PRN (held off on albuterol due to tachycardia)  -On 3L nasal cannula with improvement  -Wean supplemental O2 as tolerated  - monitoring    2. Tachycardia  -EKG shows sinus tachycardia with   -CTA negative for PE  -Suspect multifactorial from acute hypoxemic respiratory failure, nausea and vomiting, malignancy   -Telemetry and management of above disease processes    3. Enterovirus infection  -No active dyspnea or cough, only hypoxic  -Supportive care as needed  -Can add anti-tussives PRN when pt able to tolerate PO    4. Nausea and vomiting  -Acute on chronic, suspect chemotherapy related nausea and vomiting  -Pt actively vomiting during encounter, states cannot take pills  -Advance diet as tolerated  -Aspiration precautions  -IV Zofran and Reglan PRN  -On IV valium PRN which can be used for refractory nausea and vomiting    5. Hx metastatic breast cancer   -On methadone TID and dilaudid PRN but currently unable to tolerate pills   -Start IV dilaudid 0.5mg and 1mg PRN moderate and severe pain  -Holding pregabalin 200mg BID and memantine 10mg BID while NPO  -[facility] consulted    6. History of  pericardial effusion  -Holding colchicine 0.6mg BID while NPO    7. Hx anxiety    -Holding fluoxetine 80mg daily while NPO  -On PO valium PRN, start IV valium 5mg q8hr PRN while NPO    -Lovenox, high risk given breast malignancy

## 2022-09-14 NOTE — PATIENT PROFILE ADULT - FALL HARM RISK - HARM RISK INTERVENTIONS

## 2022-09-14 NOTE — H&P ADULT - ASSESSMENT
36yoF hx metastatic breast cancer with mets to brain, lung, liver and bone, on active chemotherapy, s/p recent completion of cranial radiation, also hx of malignant pleural effusion s/p chest tube (12/23/21), pericardial effusion with tamponade s/p pericardiocentesis (12/28/21), pathologic compression fractures, anxiety disorder who was sent from outpatient doctor for hypoxia    Acute hypoxemic respiratory failure   -Suspect multifactorial from lymphangitic spread and active enterovirus infection  -CT chest shows small L-pleural effusion, intralobular septal thickening with patchy groundglass and nodular opacities suggestive of pulmonary edema vs. lymphangitic spread  -Less suspicion for pulmonary edema (prior TTE WNL, no leg edema)  -Trial of IV steroids for hypoxia from possible bronchospasm due to enterovirus infection  -Ipratropium PRN (held off on albuterol due to tachycardia)  -On 3L nasal cannula with improvement  -Wean supplemental O2 as tolerated  - monitoring    Tachycardia  -EKG shows sinus tachycardia with   -CTA negative for PE  -Suspect multifactorial from acute hypoxemic respiratory failure, nausea and vomiting, malignancy   -Telemetry and management of above disease processes    Enterovirus infection  -No active dyspnea or cough, only hypoxic  -Supportive care as needed  -Can add anti-tussives PRN when pt able to tolerate PO    Nausea and vomiting  -Acute on chronic, suspect chemotherapy related nausea and vomiting  -Pt actively vomiting during encounter, states cannot take pills  -Strict NPO for now   -Advance diet when symptoms improve  -Aspiration precautions  -IV Zofran and Reglan PRN  -On IV valium PRN which can be used for refractory nausea and vomiting    Hx metastatic breast cancer   -On methadone TID and dilaudid PRN but currently unable to tolerate pills   -Start IV dilaudid 0.5mg and 1mg PRN moderate and severe pain  -Holding pregabalin 200mg BID and memantine 10mg BID while NPO  -[facility] consulted    Hx pericardial effusion  -Holding colchicine 0.6mg BID while NPO    Hx anxiety    -Holding fluoxetine 80mg daily while NPO  -On PO valium PRN, start IV valium 5mg q8hr PRN while NPO    Prophylactic measure  -Lovenox, high risk given breast malignancy  -Hx of brain mets noted, now s/p steroids and RT with interval improvement (none seen on recent CT head), also less propensity for spontaneous ICH from brain metastasis due to breast cancer

## 2022-09-15 PROCEDURE — 99233 SBSQ HOSP IP/OBS HIGH 50: CPT

## 2022-09-15 RX ORDER — HYDROMORPHONE HYDROCHLORIDE 2 MG/ML
1 INJECTION INTRAMUSCULAR; INTRAVENOUS; SUBCUTANEOUS EVERY 6 HOURS
Refills: 0 | Status: DISCONTINUED | OUTPATIENT
Start: 2022-09-15 | End: 2022-09-16

## 2022-09-15 RX ORDER — POLYETHYLENE GLYCOL 3350 17 G/17G
17 POWDER, FOR SOLUTION ORAL DAILY
Refills: 0 | Status: DISCONTINUED | OUTPATIENT
Start: 2022-09-15 | End: 2022-09-16

## 2022-09-15 RX ORDER — SENNA PLUS 8.6 MG/1
2 TABLET ORAL AT BEDTIME
Refills: 0 | Status: DISCONTINUED | OUTPATIENT
Start: 2022-09-15 | End: 2022-09-16

## 2022-09-15 RX ORDER — METHADONE HYDROCHLORIDE 40 MG/1
10 TABLET ORAL THREE TIMES A DAY
Refills: 0 | Status: DISCONTINUED | OUTPATIENT
Start: 2022-09-15 | End: 2022-09-16

## 2022-09-15 RX ORDER — SODIUM,POTASSIUM PHOSPHATES 278-250MG
1 POWDER IN PACKET (EA) ORAL
Refills: 0 | Status: COMPLETED | OUTPATIENT
Start: 2022-09-15 | End: 2022-09-16

## 2022-09-15 RX ADMIN — PANTOPRAZOLE SODIUM 40 MILLIGRAM(S): 20 TABLET, DELAYED RELEASE ORAL at 13:24

## 2022-09-15 RX ADMIN — HYDROMORPHONE HYDROCHLORIDE 1 MILLIGRAM(S): 2 INJECTION INTRAMUSCULAR; INTRAVENOUS; SUBCUTANEOUS at 04:45

## 2022-09-15 RX ADMIN — HYDROMORPHONE HYDROCHLORIDE 1 MILLIGRAM(S): 2 INJECTION INTRAMUSCULAR; INTRAVENOUS; SUBCUTANEOUS at 10:48

## 2022-09-15 RX ADMIN — METHADONE HYDROCHLORIDE 10 MILLIGRAM(S): 40 TABLET ORAL at 14:19

## 2022-09-15 RX ADMIN — HYDROMORPHONE HYDROCHLORIDE 1 MILLIGRAM(S): 2 INJECTION INTRAMUSCULAR; INTRAVENOUS; SUBCUTANEOUS at 13:24

## 2022-09-15 RX ADMIN — ENOXAPARIN SODIUM 40 MILLIGRAM(S): 100 INJECTION SUBCUTANEOUS at 04:32

## 2022-09-15 RX ADMIN — HYDROMORPHONE HYDROCHLORIDE 1 MILLIGRAM(S): 2 INJECTION INTRAMUSCULAR; INTRAVENOUS; SUBCUTANEOUS at 04:29

## 2022-09-15 RX ADMIN — HYDROMORPHONE HYDROCHLORIDE 1 MILLIGRAM(S): 2 INJECTION INTRAMUSCULAR; INTRAVENOUS; SUBCUTANEOUS at 14:30

## 2022-09-15 RX ADMIN — METHADONE HYDROCHLORIDE 10 MILLIGRAM(S): 40 TABLET ORAL at 22:15

## 2022-09-15 RX ADMIN — HYDROMORPHONE HYDROCHLORIDE 1 MILLIGRAM(S): 2 INJECTION INTRAMUSCULAR; INTRAVENOUS; SUBCUTANEOUS at 09:40

## 2022-09-15 RX ADMIN — Medication 10 MILLIGRAM(S): at 14:19

## 2022-09-15 RX ADMIN — Medication 10 MILLIGRAM(S): at 22:15

## 2022-09-15 RX ADMIN — Medication 5 MILLIGRAM(S): at 18:18

## 2022-09-15 RX ADMIN — ONDANSETRON 4 MILLIGRAM(S): 8 TABLET, FILM COATED ORAL at 13:24

## 2022-09-15 RX ADMIN — HYDROMORPHONE HYDROCHLORIDE 1 MILLIGRAM(S): 2 INJECTION INTRAMUSCULAR; INTRAVENOUS; SUBCUTANEOUS at 20:50

## 2022-09-15 RX ADMIN — Medication 40 MILLIGRAM(S): at 18:19

## 2022-09-15 RX ADMIN — HYDROMORPHONE HYDROCHLORIDE 1 MILLIGRAM(S): 2 INJECTION INTRAMUSCULAR; INTRAVENOUS; SUBCUTANEOUS at 20:20

## 2022-09-15 RX ADMIN — Medication 1 PACKET(S): at 14:19

## 2022-09-15 RX ADMIN — Medication 40 MILLIGRAM(S): at 05:59

## 2022-09-15 NOTE — CONSULT NOTE ADULT - SUBJECTIVE AND OBJECTIVE BOX
HPI: Patient is a 36y Female seen on consultation for the evaluation and management of Metastatic breast cancer to spine, liver, with malignant pleural effusion, pathologic compression fractures anxiety.  Receiving chemotherapy through Dr. [facility]; now on [employer]tu last on 8/29, recent brain mets s/p WBRT. Was sent from outpatient doctor for hypoxia.   at bedside and providing supplemental hx.  Pt was previously on supplemental O2 with NC when she was diagnosed with pleural effusion back in 12/2021 but had been gradually weaned off and haven’t used it in a few months.  Pt was seen at [facility] yesterday for altered mental status in setting of medical marijuana usage and was discharged after normal CT head findings and clinical improvement back to her baseline. Pt reports feeling fatigued for the past day with generalized body pain and went to see one of her outpatient doctors who found her to be hypoxic with O2 sat in 70-80s and advised her to seek hospital evaluation.  Pt denies any cough, dyspnea, orthopnea, leg swelling, chest pain.  On admission, pt remained hypoxic and was placed on NC with improvement. She was also found to be entero/rhinovirus positive.      -CT chest shows small L-pleural effusion, intralobular septal thickening with patchy groundglass and nodular opacities suggestive of pulmonary edema vs. 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)    FAMILY HISTORY:  FH: CVA (cerebrovascular accident)      MEDICATIONS  (STANDING):  enoxaparin Injectable 40 milliGRAM(s) SubCutaneous every 24 hours  methylPREDNISolone sodium succinate Injectable 40 milliGRAM(s) IV Push every 8 hours  metoclopramide Injectable 10 milliGRAM(s) IV Push every 8 hours  pantoprazole  Injectable 40 milliGRAM(s) IV Push daily      ICU Vital Signs Last 24 Hrs  T(C): 36.8 (15 Sep 2022 04:26), Max: 37.2 (14 Sep 2022 10:00)  T(F): 98.2 (15 Sep 2022 04:26), Max: 98.9 (14 Sep 2022 10:00)  HR: 107 (15 Sep 2022 04:26) (89 - 107)  BP: 112/76 (15 Sep 2022 04:26) (102/67 - 117/75)  BP(mean): --  ABP: --  ABP(mean): --  RR: 18 (15 Sep 2022 04:26) (18 - 18)  SpO2: 99% (15 Sep 2022 04:26) (99% - 100%)    O2 Parameters below as of 15 Sep 2022 04:26  Patient On (Oxygen Delivery Method): nasal cannula  O2 Flow (L/min): 2      Gen - alert and oriented  Heart - S1S2 RRR  Lung - dec BS b/l  Abd - soft ND NT  eXT  no edema                            10.6   3.67  )-----------( 369      ( 14 Sep 2022 05:27 )             33.1     09-14    136  |  102  |  5.8<L>  ----------------------------<  90  4.3   |  25.0  |  0.50    Ca    7.8<L>      14 Sep 2022 05:27  Phos  2.3     09-14  Mg     2.0     09-14    TPro  6.5<L>  /  Alb  3.7  /  TBili  0.3<L>  /  DBili  x   /  AST  29  /  ALT  28  /  AlkPhos  99  09-13

## 2022-09-15 NOTE — CONSULT NOTE ADULT - ASSESSMENT
Patient is a 36y Female seen on consultation for the evaluation and management of Metastatic breast cancer to spine, liver, with malignant pleural effusion, pathologic compression fractures anxiety.  Receiving [facility], recent brain mets s/p WBRT. Was sent from outpatient doctor for hypoxia  CT - Intralobular septal thickening throughout both lungs with patchy  groundglass and nodular opacities which may represent combination of  pulmonary edema and lymphangitic spread. Small left pleural effusion extending along the left major fissure.  + Enterovirus infection    1) metastatic breast cancer  on Enhertu as above  recent brain mets s/p WBRT   will check ca 15 3  PET CT as outpatient    2) Acute resp distress  + enterovirus  CT scan showing GGO/pulm edema v/s lymphangitic spread  agree with epimeric treatment with steroids   TTE WNL so pulm edema less likely   patient feels better today, consider weaning off O2, trial of home PO meds, PO steroids and possible DC home in next 24-48 hours       3) CBC stable, mild leukopenia but ANC > 1000    4) Pain Mx   on dilaudid currently  unable to tolerate home PO meds    5) DVT ppx    will follow

## 2022-09-15 NOTE — PROGRESS NOTE ADULT - ASSESSMENT
The patient is a 36 year old female with a past medical history of metastatic breast cancer with mets to brain, lung, liver and bone, on active chemotherapy, s/p recent completion of cranial radiation, also hx of malignant pleural effusion s/p chest tube (12/23/21), pericardial effusion with tamponade s/p pericardiocentesis (12/28/21), pathologic compression fractures, anxiety disorder who was sent from outpatient doctor for hypoxia    Assessment/Plan;    1. Acute hypoxemic respiratory failure   -Suspect multifactorial from lymphangitic spread and active enterovirus infection  -CT chest shows small L-pleural effusion, intralobular septal thickening with patchy groundglass and nodular opacities suggestive of pulmonary edema vs. lymphangitic spread  -Less suspicion for pulmonary edema (prior TTE WNL, no leg edema)  -Trial of IV steroids for hypoxia from possible bronchospasm due to enterovirus infection  -Ipratropium PRN (held off on albuterol due to tachycardia)  -On 2L nasal cannula with improvement  -Wean supplemental O2 as tolerated  - monitoring    2. Tachycardia  -EKG shows sinus tachycardia with   -CTA negative for PE  -Suspect multifactorial from acute hypoxemic respiratory failure, nausea and vomiting, malignancy   -Telemetry and management of above disease processes    3. Enterovirus infection  -No active dyspnea or cough, only hypoxic  -Supportive care as needed  -Can add anti-tussives PRN when pt able to tolerate PO    4. Nausea and vomiting  -Acute on chronic, suspect chemotherapy related nausea and vomiting  -Pt actively vomiting during encounter, states cannot take pills  -Advance diet as tolerated  -Aspiration precautions  -IV Zofran and Reglan PRN  -On IV valium PRN which can be used for refractory nausea and vomiting    5. Hx metastatic breast cancer   -On methadone TID and dilaudid PRN but currently unable to tolerate pills   -Start IV dilaudid 0.5mg and 1mg PRN moderate and severe pain  -Holding pregabalin 200mg BID and memantine 10mg BID while NPO  -[facility]    6. History of  pericardial effusion  -Holding colchicine 0.6mg BID while NPO    7. Hx anxiety    -Holding fluoxetine 80mg daily while NPO  -On PO valium PRN, start IV valium 5mg q8hr PRN while NPO    -Lovenox, high risk given breast malignancy   The patient is a 36 year old female with a past medical history of metastatic breast cancer with mets to brain, lung, liver and bone, on active chemotherapy, s/p recent completion of cranial radiation, also hx of malignant pleural effusion s/p chest tube (12/23/21), pericardial effusion with tamponade s/p pericardiocentesis (12/28/21), pathologic compression fractures, anxiety disorder who was sent from outpatient doctor for hypoxia    Assessment/Plan;    1. Acute hypoxemic respiratory failure   -Suspect multifactorial from lymphangitic spread and active enterovirus infection  -CT chest shows small L-pleural effusion, intralobular septal thickening with patchy groundglass and nodular opacities suggestive of pulmonary edema vs. lymphangitic spread  -Less suspicion for pulmonary edema (prior TTE WNL, no leg edema)  -Trial of IV steroids for hypoxia from possible bronchospasm due to enterovirus infection  -Ipratropium PRN (held off on albuterol due to tachycardia)  -On 2L nasal cannula with improvement; Check Spo2 on room air at rest and ambulation     2. Tachycardia  -EKG shows sinus tachycardia with   -CTA negative for PE  -Suspect multifactorial from acute hypoxemic respiratory failure, nausea and vomiting, malignancy   -Telemetry and management of above disease processes    3. Enterovirus infection  -No active dyspnea or cough, only hypoxic  -Supportive care as needed    4. Nausea and vomiting  -Acute on chronic, suspect chemotherapy related nausea and vomiting  -Symptoms improving, advance to regular diet if tolerating resume PO medications   -Aspiration precautions  -IV Zofran and Reglan PRN  -On IV valium PRN which can be used for refractory nausea and vomiting    5. Hx metastatic breast cancer   -On methadone TID and dilaudid PRN but currently unable to tolerate pills   -Start IV dilaudid 0.5mg and 1mg PRN moderate and severe pain  -Holding pregabalin 200mg BID and memantine 10mg BID while NPO  -[facility]    6. History of  pericardial effusion  -Holding colchicine 0.6mg BID while NPO    7. Hx anxiety    -Holding fluoxetine 80mg daily while NPO  -On PO valium PRN, start IV valium 5mg q8hr PRN while NPO    -Lovenox, high risk given breast malignancy

## 2022-09-16 ENCOUNTER — TRANSCRIPTION ENCOUNTER (OUTPATIENT)
Age: 37
End: 2022-09-16

## 2022-09-16 VITALS
HEART RATE: 97 BPM | DIASTOLIC BLOOD PRESSURE: 90 MMHG | RESPIRATION RATE: 18 BRPM | SYSTOLIC BLOOD PRESSURE: 130 MMHG | TEMPERATURE: 98 F | OXYGEN SATURATION: 98 %

## 2022-09-16 PROCEDURE — 85610 PROTHROMBIN TIME: CPT

## 2022-09-16 PROCEDURE — 84100 ASSAY OF PHOSPHORUS: CPT

## 2022-09-16 PROCEDURE — 96375 TX/PRO/DX INJ NEW DRUG ADDON: CPT

## 2022-09-16 PROCEDURE — 99285 EMERGENCY DEPT VISIT HI MDM: CPT | Mod: 25

## 2022-09-16 PROCEDURE — 83605 ASSAY OF LACTIC ACID: CPT

## 2022-09-16 PROCEDURE — 94640 AIRWAY INHALATION TREATMENT: CPT

## 2022-09-16 PROCEDURE — 36415 COLL VENOUS BLD VENIPUNCTURE: CPT

## 2022-09-16 PROCEDURE — 80053 COMPREHEN METABOLIC PANEL: CPT

## 2022-09-16 PROCEDURE — 85730 THROMBOPLASTIN TIME PARTIAL: CPT

## 2022-09-16 PROCEDURE — 84145 PROCALCITONIN (PCT): CPT

## 2022-09-16 PROCEDURE — 0225U NFCT DS DNA&RNA 21 SARSCOV2: CPT

## 2022-09-16 PROCEDURE — 71275 CT ANGIOGRAPHY CHEST: CPT | Mod: MA

## 2022-09-16 PROCEDURE — 85025 COMPLETE CBC W/AUTO DIFF WBC: CPT

## 2022-09-16 PROCEDURE — 80048 BASIC METABOLIC PNL TOTAL CA: CPT

## 2022-09-16 PROCEDURE — 83735 ASSAY OF MAGNESIUM: CPT

## 2022-09-16 PROCEDURE — 93005 ELECTROCARDIOGRAM TRACING: CPT

## 2022-09-16 PROCEDURE — 99239 HOSP IP/OBS DSCHRG MGMT >30: CPT

## 2022-09-16 PROCEDURE — 96374 THER/PROPH/DIAG INJ IV PUSH: CPT

## 2022-09-16 RX ORDER — ONDANSETRON 8 MG/1
8 TABLET, FILM COATED ORAL EVERY 6 HOURS
Refills: 0 | Status: DISCONTINUED | OUTPATIENT
Start: 2022-09-16 | End: 2022-09-16

## 2022-09-16 RX ORDER — FLUOXETINE HCL 10 MG
40 CAPSULE ORAL DAILY
Refills: 0 | Status: DISCONTINUED | OUTPATIENT
Start: 2022-09-16 | End: 2022-09-16

## 2022-09-16 RX ORDER — SENNA PLUS 8.6 MG/1
2 TABLET ORAL
Qty: 0 | Refills: 0 | DISCHARGE
Start: 2022-09-16

## 2022-09-16 RX ORDER — ALBUTEROL 90 UG/1
2 AEROSOL, METERED ORAL EVERY 6 HOURS
Refills: 0 | Status: DISCONTINUED | OUTPATIENT
Start: 2022-09-16 | End: 2022-09-16

## 2022-09-16 RX ORDER — PROCHLORPERAZINE MALEATE 5 MG
5 TABLET ORAL EVERY 6 HOURS
Refills: 0 | Status: DISCONTINUED | OUTPATIENT
Start: 2022-09-16 | End: 2022-09-16

## 2022-09-16 RX ORDER — DIAZEPAM 5 MG
5 TABLET ORAL EVERY 8 HOURS
Refills: 0 | Status: DISCONTINUED | OUTPATIENT
Start: 2022-09-16 | End: 2022-09-16

## 2022-09-16 RX ORDER — PANTOPRAZOLE SODIUM 20 MG/1
40 TABLET, DELAYED RELEASE ORAL
Refills: 0 | Status: DISCONTINUED | OUTPATIENT
Start: 2022-09-16 | End: 2022-09-16

## 2022-09-16 RX ORDER — ONDANSETRON 8 MG/1
4 TABLET, FILM COATED ORAL ONCE
Refills: 0 | Status: COMPLETED | OUTPATIENT
Start: 2022-09-16 | End: 2022-09-16

## 2022-09-16 RX ORDER — METHYLPHENIDATE HCL 5 MG
10 TABLET ORAL
Refills: 0 | Status: DISCONTINUED | OUTPATIENT
Start: 2022-09-16 | End: 2022-09-16

## 2022-09-16 RX ORDER — POLYETHYLENE GLYCOL 3350 17 G/17G
17 POWDER, FOR SOLUTION ORAL
Qty: 0 | Refills: 0 | DISCHARGE
Start: 2022-09-16

## 2022-09-16 RX ORDER — COLCHICINE 0.6 MG
0.6 TABLET ORAL EVERY 12 HOURS
Refills: 0 | Status: DISCONTINUED | OUTPATIENT
Start: 2022-09-16 | End: 2022-09-16

## 2022-09-16 RX ORDER — HYDROMORPHONE HYDROCHLORIDE 2 MG/ML
0.5 INJECTION INTRAMUSCULAR; INTRAVENOUS; SUBCUTANEOUS ONCE
Refills: 0 | Status: DISCONTINUED | OUTPATIENT
Start: 2022-09-16 | End: 2022-09-16

## 2022-09-16 RX ORDER — HYDROMORPHONE HYDROCHLORIDE 2 MG/ML
4 INJECTION INTRAMUSCULAR; INTRAVENOUS; SUBCUTANEOUS EVERY 4 HOURS
Refills: 0 | Status: DISCONTINUED | OUTPATIENT
Start: 2022-09-16 | End: 2022-09-16

## 2022-09-16 RX ADMIN — HYDROMORPHONE HYDROCHLORIDE 4 MILLIGRAM(S): 2 INJECTION INTRAMUSCULAR; INTRAVENOUS; SUBCUTANEOUS at 13:08

## 2022-09-16 RX ADMIN — HYDROMORPHONE HYDROCHLORIDE 1 MILLIGRAM(S): 2 INJECTION INTRAMUSCULAR; INTRAVENOUS; SUBCUTANEOUS at 11:27

## 2022-09-16 RX ADMIN — ENOXAPARIN SODIUM 40 MILLIGRAM(S): 100 INJECTION SUBCUTANEOUS at 06:07

## 2022-09-16 RX ADMIN — HYDROMORPHONE HYDROCHLORIDE 0.5 MILLIGRAM(S): 2 INJECTION INTRAMUSCULAR; INTRAVENOUS; SUBCUTANEOUS at 00:16

## 2022-09-16 RX ADMIN — Medication 10 MILLIGRAM(S): at 06:07

## 2022-09-16 RX ADMIN — HYDROMORPHONE HYDROCHLORIDE 0.5 MILLIGRAM(S): 2 INJECTION INTRAMUSCULAR; INTRAVENOUS; SUBCUTANEOUS at 00:00

## 2022-09-16 RX ADMIN — Medication 5 MILLIGRAM(S): at 06:23

## 2022-09-16 RX ADMIN — HYDROMORPHONE HYDROCHLORIDE 1 MILLIGRAM(S): 2 INJECTION INTRAMUSCULAR; INTRAVENOUS; SUBCUTANEOUS at 05:15

## 2022-09-16 RX ADMIN — ONDANSETRON 4 MILLIGRAM(S): 8 TABLET, FILM COATED ORAL at 14:21

## 2022-09-16 RX ADMIN — Medication 1 PACKET(S): at 06:08

## 2022-09-16 RX ADMIN — HYDROMORPHONE HYDROCHLORIDE 1 MILLIGRAM(S): 2 INJECTION INTRAMUSCULAR; INTRAVENOUS; SUBCUTANEOUS at 10:52

## 2022-09-16 RX ADMIN — HYDROMORPHONE HYDROCHLORIDE 1 MILLIGRAM(S): 2 INJECTION INTRAMUSCULAR; INTRAVENOUS; SUBCUTANEOUS at 04:46

## 2022-09-16 RX ADMIN — Medication 10 MILLIGRAM(S): at 13:08

## 2022-09-16 RX ADMIN — METHADONE HYDROCHLORIDE 10 MILLIGRAM(S): 40 TABLET ORAL at 06:07

## 2022-09-16 RX ADMIN — HYDROMORPHONE HYDROCHLORIDE 4 MILLIGRAM(S): 2 INJECTION INTRAMUSCULAR; INTRAVENOUS; SUBCUTANEOUS at 13:43

## 2022-09-16 RX ADMIN — METHADONE HYDROCHLORIDE 10 MILLIGRAM(S): 40 TABLET ORAL at 13:08

## 2022-09-16 RX ADMIN — Medication 40 MILLIGRAM(S): at 06:07

## 2022-09-16 NOTE — DISCHARGE NOTE PROVIDER - CARE PROVIDER_API CALL
Huy Tiwari)  Family Medicine  [address] [place] [address]  Phone: [phone]  Fax: [phone]  Follow Up Time: 1-3 days    Morenita Sandoval)  HematologyOncology; Internal Medicine; Medical Oncology  [address]  [place] [address]  Phone: [phone]  Fax: [phone]  Follow Up Time: 1-3 days

## 2022-09-16 NOTE — DISCHARGE NOTE PROVIDER - NSDCCPCAREPLAN_GEN_ALL_CORE_FT
PRINCIPAL DISCHARGE DIAGNOSIS  Diagnosis: Hypoxia  Assessment and Plan of Treatment: - In the setting of enterovirus. Improved with supplemental oxygen, steroids, and supportive care.   - CT scan with pulmonary edema vs. lymphangitic spread; less likely pulmonary edema given normal TTE. Evaluated by heme-onc, please follow up outpatient for PET scan and monitoring.      SECONDARY DISCHARGE DIAGNOSES  Diagnosis: Enterovirus infection  Assessment and Plan of Treatment: - Treated and improving as above.    Diagnosis: Nausea & vomiting  Assessment and Plan of Treatment: - Likley cheomotherapy related. Treated with antiemetics.  - Diet advanced and well tolerated.   - Please continue to take your medications as directed and follow up outpatient with your PCP and heme-oncologist.    Diagnosis: Metastatic breast cancer  Assessment and Plan of Treatment: - CT scan with pulmonary edema vs. lymphangitic spread; less likely pulmonary edema given normal TTE.   - Evaluated by heme-onc, please follow up outpatient for PET scan and monitoring.

## 2022-09-16 NOTE — PROGRESS NOTE ADULT - SUBJECTIVE AND OBJECTIVE BOX
HPI: Patient is a 36y Female seen on consultation for the evaluation and management of Metastatic breast cancer to spine, liver, with malignant pleural effusion, pathologic compression fractures anxiety.  Receiving chemotherapy through Dr. Chapa [facility]; now on [facility] last on 8/29, recent brain mets s/p WBRT. Was sent from outpatient doctor for hypoxia.   at bedside and providing supplemental hx.  Pt was previously on supplemental O2 with NC when she was diagnosed with pleural effusion back in 12/2021 but had been gradually weaned off and haven’t used it in a few months.  Pt was seen at [facility] yesterday for altered mental status in setting of medical marijuana usage and was discharged after normal CT head findings and clinical improvement back to her baseline. Pt reports feeling fatigued for the past day with generalized body pain and went to see one of her outpatient doctors who found her to be hypoxic with O2 sat in 70-80s and advised her to seek hospital evaluation.  Pt denies any cough, dyspnea, orthopnea, leg swelling, chest pain.  On admission, pt remained hypoxic and was placed on NC with improvement. She was also found to be entero/rhinovirus positive.      -CT chest shows small L-pleural effusion, intralobular septal thickening with patchy groundglass and nodular opacities suggestive of pulmonary edema vs. lymphangitic spread    seen at bedside, resting comfortably. She is on 2l NC saturating well, afebrile  Going to be discharged today.  Feels better.        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)    FAMILY HISTORY:  FH: CVA (cerebrovascular accident)      MEDICATIONS  (STANDING):  colchicine 0.6 milliGRAM(s) Oral every 12 hours  enoxaparin Injectable 40 milliGRAM(s) SubCutaneous every 24 hours  FLUoxetine 40 milliGRAM(s) Oral daily  methadone    Tablet 10 milliGRAM(s) Oral three times a day  methylphenidate 10 milliGRAM(s) Oral two times a day  methylPREDNISolone sodium succinate Injectable 40 milliGRAM(s) IV Push every 12 hours  metoclopramide Injectable 10 milliGRAM(s) IV Push every 8 hours  pantoprazole    Tablet 40 milliGRAM(s) Oral before breakfast  polyethylene glycol 3350 17 Gram(s) Oral daily  pregabalin 200 milliGRAM(s) Oral two times a day  senna 2 Tablet(s) Oral at bedtime    MEDICATIONS  (PRN):  ALBUTerol    90 MICROgram(s) HFA Inhaler 2 Puff(s) Inhalation every 6 hours PRN Shortness of Breath and/or Wheezing  diazepam    Tablet 5 milliGRAM(s) Oral every 8 hours PRN anxiety  HYDROmorphone   Tablet 4 milliGRAM(s) Oral every 4 hours PRN Severe Pain (7 - 10)  ondansetron    Tablet 8 milliGRAM(s) Oral every 6 hours PRN Nausea and/or Vomiting  ondansetron Injectable 4 milliGRAM(s) IV Push every 6 hours PRN Nausea and/or Vomiting      Vital Signs Last 24 Hrs  T(C): 36.4 (16 Sep 2022 15:48), Max: 36.9 (16 Sep 2022 04:45)  T(F): 97.6 (16 Sep 2022 15:48), Max: 98.4 (16 Sep 2022 04:45)  HR: 97 (16 Sep 2022 15:48) (77 - 97)  BP: 130/90 (16 Sep 2022 15:48) (121/79 - 137/89)  BP(mean): --  RR: 18 (16 Sep 2022 15:48) (18 - 18)  SpO2: 98% (16 Sep 2022 15:48) (98% - 99%)    Parameters below as of 16 Sep 2022 15:48  Patient On (Oxygen Delivery Method): nasal cannula      O2 Parameters below as of 15 Sep 2022 04:26  Patient On (Oxygen Delivery Method): nasal cannula  O2 Flow (L/min): 2      Gen - alert and oriented  Heart - S1S2 RRR  Lung - dec BS b/l  Abd - soft ND NT  eXT  no edema                            10.6   3.67  )-----------( 369      ( 14 Sep 2022 05:27 )             33.1     09-14    136  |  102  |  5.8<L>  ----------------------------<  90  4.3   |  25.0  |  0.50    Ca    7.8<L>      14 Sep 2022 05:27  Phos  2.3     09-14  Mg     2.0     09-14    TPro  6.5<L>  /  Alb  3.7  /  TBili  0.3<L>  /  DBili  x   /  AST  29  /  ALT  28  /  AlkPhos  99  09-13            
CC: Follow up     INTERVAL HPI/OVERNIGHT EVENTS: Patient seen and examined, feels better this morning. still has nausea no vomiting. Denies sob or cough. On 3 liters NC       Vital Signs Last 24 Hrs  T(C): 37.2 (14 Sep 2022 10:00), Max: 37.3 (14 Sep 2022 05:19)  T(F): 98.9 (14 Sep 2022 10:00), Max: 99.2 (14 Sep 2022 05:19)  HR: 99 (14 Sep 2022 10:00) (99 - 125)  BP: 103/77 (14 Sep 2022 10:00) (102/70 - 118/79)  BP(mean): --  RR: 18 (14 Sep 2022 10:00) (18 - 20)  SpO2: 100% (14 Sep 2022 10:00) (96% - 100%)    Parameters below as of 14 Sep 2022 10:00  Patient On (Oxygen Delivery Method): nasal cannula        PHYSICAL EXAM:    GENERAL: NAD, AOX3  HEAD:  Atraumatic, Normocephalic  EYES: conjunctiva and sclera clear  ENMT: Moist mucous membranes  NECK: Supple  CHEST/LUNG: Bilateral rhonchi   HEART: Regular rate and rhythm; No murmurs, rubs, or gallops  ABDOMEN: Soft, Nontender, Nondistended; Bowel sounds present  EXTREMITIES:  2+ Peripheral Pulses, No clubbing, cyanosis, or edema        MEDICATIONS  (STANDING):  enoxaparin Injectable 40 milliGRAM(s) SubCutaneous every 24 hours  methylPREDNISolone sodium succinate Injectable 40 milliGRAM(s) IV Push every 8 hours  metoclopramide Injectable 10 milliGRAM(s) IV Push every 8 hours  pantoprazole  Injectable 40 milliGRAM(s) IV Push daily    MEDICATIONS  (PRN):  diazepam  Injectable 5 milliGRAM(s) IV Push every 8 hours PRN Anxiety and/or refractory nausea/vomiting  HYDROmorphone  Injectable 0.5 milliGRAM(s) IV Push four times a day PRN Moderate Pain (4 - 6)  HYDROmorphone  Injectable 1 milliGRAM(s) IV Push every 4 hours PRN Severe Pain (7 - 10)  ipratropium    for Nebulization 500 MICROGram(s) Nebulizer every 6 hours PRN Shortness of Breath and/or Wheezing  ondansetron Injectable 4 milliGRAM(s) IV Push every 6 hours PRN Nausea and/or Vomiting      Allergies    Perjeta (Other (Severe))  pertuzumab (Other (Severe))    Intolerances          LABS:                          10.6   3.67  )-----------( 369      ( 14 Sep 2022 05:27 )             33.1     09-14    136  |  102  |  5.8<L>  ----------------------------<  90  4.3   |  25.0  |  0.50    Ca    7.8<L>      14 Sep 2022 05:27  Phos  2.3       Mg     2.0         TPro  6.5<L>  /  Alb  3.7  /  TBili  0.3<L>  /  DBili  x   /  AST  29  /  ALT  28  /  AlkPhos  99  09-13    PT/INR - ( 13 Sep 2022 19:00 )   PT: 13.5 sec;   INR: 1.16 ratio         PTT - ( 13 Sep 2022 19:00 )  PTT:37.0 sec  Urinalysis Basic - ( 12 Sep 2022 19:30 )    Color: Yellow / Appearance: Clear / S.010 / pH: x  Gluc: x / Ketone: Negative  / Bili: Negative / Urobili: Negative mg/dL   Blood: x / Protein: Negative / Nitrite: Negative   Leuk Esterase: Moderate / RBC: Negative /HPF / WBC 3-5 /HPF   Sq Epi: x / Non Sq Epi: Occasional / Bacteria: x        RADIOLOGY & ADDITIONAL TESTS:  
CC: Follow up     INTERVAL HPI/OVERNIGHT EVENTS: Patient seen and examined, afebrile overnight. Stable on 2 liters NC. Ambulating to the bathroom without diffifulty. Nausea improving. States overall pain is worse than baseline.       Vital Signs Last 24 Hrs  T(C): 36.9 (15 Sep 2022 09:38), Max: 36.9 (15 Sep 2022 09:38)  T(F): 98.4 (15 Sep 2022 09:38), Max: 98.4 (15 Sep 2022 09:38)  HR: 96 (15 Sep 2022 09:38) (89 - 107)  BP: 128/82 (15 Sep 2022 09:38) (102/67 - 128/82)  BP(mean): --  RR: 18 (15 Sep 2022 09:38) (18 - 18)  SpO2: 97% (15 Sep 2022 09:38) (95% - 100%)    Parameters below as of 15 Sep 2022 09:38  Patient On (Oxygen Delivery Method): nasal cannula        PHYSICAL EXAM:    GENERAL: NAD, AOX3  HEAD:  Atraumatic, Normocephalic  EYESconjunctiva and sclera clear  ENMT: Moist mucous membranes  NECK: Supple,  CHEST/LUNG: Clear to auscultation bilaterally; No rales, rhonchi, wheezing, or rubs  HEART: Regular rate and rhythm; No murmurs, rubs, or gallops  ABDOMEN: Soft, Nontender, Nondistended; Bowel sounds present  EXTREMITIES:  2+ Peripheral Pulses, No clubbing, cyanosis, or edema        MEDICATIONS  (STANDING):  enoxaparin Injectable 40 milliGRAM(s) SubCutaneous every 24 hours  methylPREDNISolone sodium succinate Injectable 40 milliGRAM(s) IV Push every 8 hours  metoclopramide Injectable 10 milliGRAM(s) IV Push every 8 hours  pantoprazole  Injectable 40 milliGRAM(s) IV Push daily    MEDICATIONS  (PRN):  diazepam  Injectable 5 milliGRAM(s) IV Push every 8 hours PRN Anxiety and/or refractory nausea/vomiting  HYDROmorphone  Injectable 0.5 milliGRAM(s) IV Push four times a day PRN Moderate Pain (4 - 6)  HYDROmorphone  Injectable 1 milliGRAM(s) IV Push every 4 hours PRN Severe Pain (7 - 10)  ipratropium    for Nebulization 500 MICROGram(s) Nebulizer every 6 hours PRN Shortness of Breath and/or Wheezing  ondansetron Injectable 4 milliGRAM(s) IV Push every 6 hours PRN Nausea and/or Vomiting      Allergies    Perjeta (Other (Severe))  pertuzumab (Other (Severe))    Intolerances          LABS:                          10.6   3.67  )-----------( 369      ( 14 Sep 2022 05:27 )             33.1     09-14    136  |  102  |  5.8<L>  ----------------------------<  90  4.3   |  25.0  |  0.50    Ca    7.8<L>      14 Sep 2022 05:27  Phos  2.3     09-14  Mg     2.0     09-14    TPro  6.5<L>  /  Alb  3.7  /  TBili  0.3<L>  /  DBili  x   /  AST  29  /  ALT  28  /  AlkPhos  99  09-13    PT/INR - ( 13 Sep 2022 19:00 )   PT: 13.5 sec;   INR: 1.16 ratio         PTT - ( 13 Sep 2022 19:00 )  PTT:37.0 sec      RADIOLOGY & ADDITIONAL TESTS:  
CC: Follow up    INTERVAL HPI/OVERNIGHT EVENTS: Patient seen and examined, overall feels better. Denies sob chest pain or palpitations. afebrile. Nausea improved tolerating PO       Vital Signs Last 24 Hrs  T(C): 36.9 (16 Sep 2022 04:45), Max: 36.9 (16 Sep 2022 04:45)  T(F): 98.4 (16 Sep 2022 04:45), Max: 98.4 (16 Sep 2022 04:45)  HR: 90 (16 Sep 2022 04:45) (75 - 90)  BP: 121/79 (16 Sep 2022 04:45) (120/78 - 137/89)  BP(mean): --  RR: 18 (16 Sep 2022 04:45) (18 - 18)  SpO2: 98% (16 Sep 2022 04:45) (98% - 99%)    Parameters below as of 16 Sep 2022 04:45  Patient On (Oxygen Delivery Method): nasal cannula  O2 Flow (L/min): 1      PHYSICAL EXAM:    GENERAL: NAD, AOX3  HEAD:  Atraumatic, Normocephalic  EYES: conjunctiva and sclera clear  ENMT: Moist mucous membranes  NECK: Supple  CHEST/LUNG: Clear to auscultation bilaterally; No rales, rhonchi, wheezing, or rubs  HEART: Regular rate and rhythm; No murmurs, rubs, or gallops  ABDOMEN: Soft, Nontender, Nondistended; Bowel sounds present  EXTREMITIES:  2+ Peripheral Pulses, No clubbing, cyanosis, or edema        MEDICATIONS  (STANDING):  colchicine 0.6 milliGRAM(s) Oral every 12 hours  enoxaparin Injectable 40 milliGRAM(s) SubCutaneous every 24 hours  FLUoxetine 40 milliGRAM(s) Oral daily  methadone    Tablet 10 milliGRAM(s) Oral three times a day  methylphenidate 10 milliGRAM(s) Oral two times a day  methylPREDNISolone sodium succinate Injectable 40 milliGRAM(s) IV Push every 12 hours  metoclopramide Injectable 10 milliGRAM(s) IV Push every 8 hours  pantoprazole    Tablet 40 milliGRAM(s) Oral before breakfast  polyethylene glycol 3350 17 Gram(s) Oral daily  potassium phosphate / sodium phosphate Powder (PHOS-NaK) 1 Packet(s) Oral three times a day before meals  pregabalin 200 milliGRAM(s) Oral two times a day  senna 2 Tablet(s) Oral at bedtime    MEDICATIONS  (PRN):  ALBUTerol    90 MICROgram(s) HFA Inhaler 2 Puff(s) Inhalation every 6 hours PRN Shortness of Breath and/or Wheezing  diazepam    Tablet 5 milliGRAM(s) Oral every 8 hours PRN anxiety  HYDROmorphone   Tablet 4 milliGRAM(s) Oral every 4 hours PRN Severe Pain (7 - 10)  ondansetron    Tablet 8 milliGRAM(s) Oral every 6 hours PRN Nausea and/or Vomiting  ondansetron Injectable 4 milliGRAM(s) IV Push every 6 hours PRN Nausea and/or Vomiting  prochlorperazine   Tablet 5 milliGRAM(s) Oral every 6 hours PRN nausea/vomiting      Allergies    Perjeta (Other (Severe))  pertuzumab (Other (Severe))    Intolerances          LABS:                  RADIOLOGY & ADDITIONAL TESTS:

## 2022-09-16 NOTE — DISCHARGE NOTE NURSING/CASE MANAGEMENT/SOCIAL WORK - NSDCPEFALRISK_GEN_ALL_CORE
For information on Fall & Injury Prevention, visit: [email or web address] OR  [email or web address] OR  https://www.cdc.gov/steadi/patient.html

## 2022-09-16 NOTE — DISCHARGE NOTE PROVIDER - PROVIDER TOKENS
PROVIDER:[TOKEN:[[id number]],FOLLOWUP:[1-3 days]],PROVIDER:[TOKEN:[[id number]],FOLLOWUP:[1-3 days]]

## 2022-09-16 NOTE — PROGRESS NOTE ADULT - ASSESSMENT
Patient is a 36y Female seen on consultation for the evaluation and management of Metastatic breast cancer to spine, liver, with malignant pleural effusion, pathologic compression fractures anxiety.  Receiving chemotherapy through Dr. Chapa, [facility]; now on [facility] last on 8/29, recent brain mets s/p WBRT. Was sent from outpatient doctor for hypoxia  CT - Intralobular septal thickening throughout both lungs with patchy  groundglass and nodular opacities which may represent combination of  pulmonary edema and lymphangitic spread. Small left pleural effusion extending along the left major fissure.  + Enterovirus infection    1) [facility] cancer  [facility]  recent brain mets s/p WBRT   PET CT as outpatient  has appt with Dr. Chapa on 9/19/2022 (due for next treatment on that day)    2) Acute resp distress  + enterovirus  CT scan showing GGO/pulm edema v/s lymphangitic spread  agree with epimeric treatment with steroids   TTE WNL so pulm edema less likely   patient feels better today, consider weaning off O2, trial of home PO meds, PO steroids and possible   DC home today      3) CBC stable, mild leukopenia but ANC > 1000    4) Pain Mx   better    5) DVT ppx    will follow

## 2022-09-16 NOTE — DISCHARGE NOTE PROVIDER - ATTENDING DISCHARGE PHYSICAL EXAMINATION:
Vital Signs Last 24 Hrs  T(C): 36.9 (16 Sep 2022 04:45), Max: 36.9 (16 Sep 2022 04:45)  T(F): 98.4 (16 Sep 2022 04:45), Max: 98.4 (16 Sep 2022 04:45)  HR: 90 (16 Sep 2022 04:45) (75 - 90)  BP: 121/79 (16 Sep 2022 04:45) (120/78 - 137/89)  BP(mean): --  RR: 18 (16 Sep 2022 04:45) (18 - 18)  SpO2: 98% (16 Sep 2022 04:45) (98% - 99%)    Parameters below as of 16 Sep 2022 04:45  Patient On (Oxygen Delivery Method): nasal cannula  O2 Flow (L/min): 1      PHYSICAL EXAM:    GENERAL: NAD, AOX3  HEAD:  Atraumatic, Normocephalic  EYES: conjunctiva and sclera clear  ENMT: Moist mucous membranes  NECK: Supple  CHEST/LUNG: Clear to auscultation bilaterally; No rales, rhonchi, wheezing, or rubs  HEART: Regular rate and rhythm; No murmurs, rubs, or gallops  ABDOMEN: Soft, Nontender, Nondistended; Bowel sounds present  EXTREMITIES:  2+ Peripheral Pulses, No clubbing, cyanosis, or edema

## 2022-09-16 NOTE — DISCHARGE NOTE PROVIDER - NSDCMRMEDTOKEN_GEN_ALL_CORE_FT
Albuterol (Eqv-Proventil HFA) 90 mcg/inh inhalation aerosol: 2 puff(s) inhaled every 6 hours, As Needed shortness of breath  colchicine 0.6 mg oral tablet: 1 tab(s) orally every 12 hours  Compazine 5 mg oral tablet: 1 tab(s) orally every 6 hours, As Needed  diazePAM 5 mg oral tablet: 1 tab(s) orally every 8 hours, As Needed  Dilaudid 4 mg oral tablet: 1 tab(s) orally every 4 hours, As Needed  FLUoxetine 40 mg oral capsule: 2 cap(s) orally once a day  methadone 10 mg oral tablet: orally 3 times a day, 1.5 tablets at 6AM, 2 tablets at 2PM, 1.5 tablets at 10PM  methylphenidate 10 mg oral tablet: 1 tab(s) orally 2 times a day  pantoprazole 40 mg oral delayed release tablet: 1 tab(s) orally once a day (before a meal)  pregabalin 200 mg oral capsule: 1 cap(s) orally 2 times a day  Zofran 8 mg oral tablet: 1 tab(s) orally every 6 hours, As Needed   Albuterol (Eqv-Proventil HFA) 90 mcg/inh inhalation aerosol: 2 puff(s) inhaled every 6 hours, As Needed shortness of breath  colchicine 0.6 mg oral tablet: 1 tab(s) orally every 12 hours  Compazine 5 mg oral tablet: 1 tab(s) orally every 6 hours, As Needed  diazePAM 5 mg oral tablet: 1 tab(s) orally every 8 hours, As Needed  Dilaudid 4 mg oral tablet: 1 tab(s) orally every 4 hours, As Needed  FLUoxetine 40 mg oral capsule: 2 cap(s) orally once a day  Medrol 2 mg oral tablet: Medrol Dosepak take as directed  methadone 10 mg oral tablet: orally 3 times a day, 1.5 tablets at 6AM, 2 tablets at 2PM, 1.5 tablets at 10PM  methylphenidate 10 mg oral tablet: 1 tab(s) orally 2 times a day  pantoprazole 40 mg oral delayed release tablet: 1 tab(s) orally once a day (before a meal)  polyethylene glycol 3350 oral powder for reconstitution: 17 gram(s) orally once a day  pregabalin 200 mg oral capsule: 1 cap(s) orally 2 times a day  senna leaf extract oral tablet: 2 tab(s) orally once a day (at bedtime)  Zofran 8 mg oral tablet: 1 tab(s) orally every 6 hours, As Needed

## 2022-09-16 NOTE — DISCHARGE NOTE NURSING/CASE MANAGEMENT/SOCIAL WORK - PATIENT PORTAL LINK FT
You can access the FollowMyHealth Patient Portal offered by [facility] by registering at the following website: [email or web address]. By joining [facility]’s FollowMyHealth portal, you will also be able to view your health information using other applications (apps) compatible with our system.

## 2022-09-16 NOTE — DISCHARGE NOTE PROVIDER - HOSPITAL COURSE
36 year old female with a past medical history of metastatic breast cancer with mets to brain, lung, liver and bone, on active chemotherapy, s/p recent completion of cranial radiation, hx of malignant pleural effusion s/p chest tube (12/23/21), pericardial effusion with tamponade s/p pericardiocentesis (12/28/21), pathologic compression fractures, anxiety disorder, who presented to [facility] ED from her outpatient provider after being found to be hypoxic with c/o generalized body aches, nausea and vomiting. In the ED, patient remained hypoxic and was placed on NC with improvement. She was also found to be entero/rhinovirus positive. Patient was admitted for management of acute hypoxemic respiratory failure in the setting of active enterovirus. CTA negative for PE. CT chest revealed small left pleural effusion, intralobular septal thickening with patchy ground glass and nodular opacities suggestive of pulmonary edema vs. lymphangitic spread. Evaluated by hematology-oncology, will follow up outpatient for PET scan. Patient was started on IV steroids for bronchospasm with prn ipratropium. Patient was weaned off supplemental O2 - pend ambulatory O2 ______     Patient additionally with acute on chronic nausea and vomiting, likely secondary to chemotherapy. She was treated with anti-emetics and diet was advanced as tolerated. Patient was transitioned back to PO medications which were well tolerated.    36 year old female with a past medical history of metastatic breast cancer with mets to brain, lung, liver and bone, on active chemotherapy, s/p recent completion of cranial radiation, hx of malignant pleural effusion s/p chest tube (12/23/21), pericardial effusion with tamponade s/p pericardiocentesis (12/28/21), pathologic compression fractures, anxiety disorder, who presented to [facility] ED from her outpatient provider after being found to be hypoxic with c/o generalized body aches, nausea and vomiting. In the ED, patient remained hypoxic and was placed on NC with improvement. She was also found to be entero/rhinovirus positive. Patient was admitted for management of acute hypoxemic respiratory failure in the setting of active enterovirus. CTA negative for PE. CT chest revealed small left pleural effusion, intralobular septal thickening with patchy ground glass and nodular opacities suggestive of pulmonary edema vs. lymphangitic spread. Evaluated by hematology-oncology, will follow up outpatient for PET scan. Patient was started on IV steroids for bronchospasm with prn ipratropium. Patient was weaned off supplemental O2     Patient additionally with acute on chronic nausea and vomiting, likely secondary to chemotherapy. She was treated with anti-emetics and diet was advanced as tolerated. Patient was transitioned back to PO medications which were well tolerated.

## 2022-09-16 NOTE — PROGRESS NOTE ADULT - ASSESSMENT
The patient is a 36 year old female with a past medical history of metastatic breast cancer with mets to brain, lung, liver and bone, on active chemotherapy, s/p recent completion of cranial radiation, also hx of malignant pleural effusion s/p chest tube (12/23/21), pericardial effusion with tamponade s/p pericardiocentesis (12/28/21), pathologic compression fractures, anxiety disorder who was sent from outpatient doctor for hypoxia    Assessment/Plan;    1. Acute hypoxemic respiratory failure   -Suspect multifactorial from lymphangitic spread and active enterovirus infection  -CT chest shows small L-pleural effusion, intralobular septal thickening with patchy groundglass and nodular opacities suggestive of pulmonary edema vs. lymphangitic spread  - Clinically improved  - Hypoxia resolved  - MEdrol dose pack      2. Tachycardia  - Resolved   -CTA negative for PE  -Suspect multifactorial from acute hypoxemic respiratory failure, nausea and vomiting, malignancy   -Telemetry and management of above disease processes    3. Enterovirus infection  -No active dyspnea or cough, only hypoxic  -Supportive care as needed    4. Nausea and vomiting  - Improved, tolerating PO   -Acute on chronic, suspect chemotherapy related nausea and vomiting  -Aspiration precautions    5. Hx metastatic breast cancer   - Resume po regimen for pain    6. History of  pericardial effusion  -olchicine 0.6mg BID while NPO    7. Hx anxiety    - fluoxetine 80mg daily while NPO    -Lovenox, high risk given breast malignancy    Medically stbale for discharge home

## 2022-09-26 ENCOUNTER — INPATIENT (INPATIENT)
Facility: HOSPITAL | Age: 37
LOS: 1 days | Discharge: HOME | DRG: 92 | End: 2022-09-28
Attending: INTERNAL MEDICINE | Admitting: HOSPITALIST
Payer: MEDICAID

## 2022-09-26 VITALS
HEART RATE: 102 BPM | TEMPERATURE: 99 F | RESPIRATION RATE: 18 BRPM | DIASTOLIC BLOOD PRESSURE: 78 MMHG | OXYGEN SATURATION: 95 % | HEIGHT: 61 IN | SYSTOLIC BLOOD PRESSURE: 107 MMHG | WEIGHT: 145.06 LBS

## 2022-09-26 DIAGNOSIS — R47.1 DYSARTHRIA AND ANARTHRIA: ICD-10-CM

## 2022-09-26 DIAGNOSIS — Z90.89 ACQUIRED ABSENCE OF OTHER ORGANS: Chronic | ICD-10-CM

## 2022-09-26 DIAGNOSIS — Z98.890 OTHER SPECIFIED POSTPROCEDURAL STATES: Chronic | ICD-10-CM

## 2022-09-26 PROBLEM — I31.3 PERICARDIAL EFFUSION (NONINFLAMMATORY): Chronic | Status: ACTIVE | Noted: 2022-09-14

## 2022-09-26 PROBLEM — Z87.09 PERSONAL HISTORY OF OTHER DISEASES OF THE RESPIRATORY SYSTEM: Chronic | Status: ACTIVE | Noted: 2022-09-14

## 2022-09-26 PROBLEM — C50.919 MALIGNANT NEOPLASM OF UNSPECIFIED SITE OF UNSPECIFIED FEMALE BREAST: Chronic | Status: ACTIVE | Noted: 2022-09-14

## 2022-09-26 LAB
ALBUMIN SERPL ELPH-MCNC: 3.4 G/DL — SIGNIFICANT CHANGE UP (ref 3.3–5.2)
ALP SERPL-CCNC: 78 U/L — SIGNIFICANT CHANGE UP (ref 40–120)
ALT FLD-CCNC: 31 U/L — SIGNIFICANT CHANGE UP
AMMONIA BLD-MCNC: 14 UMOL/L — SIGNIFICANT CHANGE UP (ref 11–55)
ANION GAP SERPL CALC-SCNC: 10 MMOL/L — SIGNIFICANT CHANGE UP (ref 5–17)
APPEARANCE UR: CLEAR — SIGNIFICANT CHANGE UP
APTT BLD: 34.1 SEC — SIGNIFICANT CHANGE UP (ref 27.5–35.5)
AST SERPL-CCNC: 33 U/L — HIGH
BACTERIA # UR AUTO: ABNORMAL
BASE EXCESS BLDV CALC-SCNC: 0.6 MMOL/L — SIGNIFICANT CHANGE UP (ref -2–3)
BASOPHILS # BLD AUTO: 0.07 K/UL — SIGNIFICANT CHANGE UP (ref 0–0.2)
BASOPHILS NFR BLD AUTO: 0.9 % — SIGNIFICANT CHANGE UP (ref 0–2)
BILIRUB SERPL-MCNC: 0.3 MG/DL — LOW (ref 0.4–2)
BILIRUB UR-MCNC: NEGATIVE — SIGNIFICANT CHANGE UP
BUN SERPL-MCNC: 9.3 MG/DL — SIGNIFICANT CHANGE UP (ref 8–20)
CALCIUM SERPL-MCNC: 8 MG/DL — LOW (ref 8.4–10.5)
CHLORIDE SERPL-SCNC: 104 MMOL/L — SIGNIFICANT CHANGE UP (ref 98–107)
CO2 SERPL-SCNC: 24 MMOL/L — SIGNIFICANT CHANGE UP (ref 22–29)
COLOR SPEC: YELLOW — SIGNIFICANT CHANGE UP
CREAT SERPL-MCNC: 0.7 MG/DL — SIGNIFICANT CHANGE UP (ref 0.5–1.3)
DIFF PNL FLD: NEGATIVE — SIGNIFICANT CHANGE UP
EGFR: 116 ML/MIN/1.73M2 — SIGNIFICANT CHANGE UP
EOSINOPHIL # BLD AUTO: 0.13 K/UL — SIGNIFICANT CHANGE UP (ref 0–0.5)
EOSINOPHIL NFR BLD AUTO: 1.7 % — SIGNIFICANT CHANGE UP (ref 0–6)
EPI CELLS # UR: SIGNIFICANT CHANGE UP
GIANT PLATELETS BLD QL SMEAR: PRESENT — SIGNIFICANT CHANGE UP
GLUCOSE SERPL-MCNC: 95 MG/DL — SIGNIFICANT CHANGE UP (ref 70–99)
GLUCOSE UR QL: NEGATIVE MG/DL — SIGNIFICANT CHANGE UP
HCG SERPL-ACNC: <4 MIU/ML — SIGNIFICANT CHANGE UP
HCO3 BLDV-SCNC: 26 MMOL/L — SIGNIFICANT CHANGE UP (ref 22–29)
HCT VFR BLD CALC: 36.7 % — SIGNIFICANT CHANGE UP (ref 34.5–45)
HGB BLD-MCNC: 11.5 G/DL — SIGNIFICANT CHANGE UP (ref 11.5–15.5)
INR BLD: 1.17 RATIO — HIGH (ref 0.88–1.16)
KETONES UR-MCNC: NEGATIVE — SIGNIFICANT CHANGE UP
LEUKOCYTE ESTERASE UR-ACNC: ABNORMAL
LYMPHOCYTES # BLD AUTO: 0.78 K/UL — LOW (ref 1–3.3)
LYMPHOCYTES # BLD AUTO: 10.4 % — LOW (ref 13–44)
MAGNESIUM SERPL-MCNC: 1.9 MG/DL — SIGNIFICANT CHANGE UP (ref 1.6–2.6)
MANUAL SMEAR VERIFICATION: SIGNIFICANT CHANGE UP
MCHC RBC-ENTMCNC: 29 PG — SIGNIFICANT CHANGE UP (ref 27–34)
MCHC RBC-ENTMCNC: 31.3 GM/DL — LOW (ref 32–36)
MCV RBC AUTO: 92.7 FL — SIGNIFICANT CHANGE UP (ref 80–100)
MONOCYTES # BLD AUTO: 0.52 K/UL — SIGNIFICANT CHANGE UP (ref 0–0.9)
MONOCYTES NFR BLD AUTO: 7 % — SIGNIFICANT CHANGE UP (ref 2–14)
MYELOCYTES NFR BLD: 0.9 % — HIGH (ref 0–0)
NEUTROPHILS # BLD AUTO: 5.92 K/UL — SIGNIFICANT CHANGE UP (ref 1.8–7.4)
NEUTROPHILS NFR BLD AUTO: 79.1 % — HIGH (ref 43–77)
NITRITE UR-MCNC: NEGATIVE — SIGNIFICANT CHANGE UP
PCO2 BLDV: 49 MMHG — HIGH (ref 39–42)
PH BLDV: 7.34 — SIGNIFICANT CHANGE UP (ref 7.32–7.43)
PH UR: 6.5 — SIGNIFICANT CHANGE UP (ref 5–8)
PHOSPHATE SERPL-MCNC: 2.2 MG/DL — LOW (ref 2.4–4.7)
PLAT MORPH BLD: NORMAL — SIGNIFICANT CHANGE UP
PLATELET # BLD AUTO: 311 K/UL — SIGNIFICANT CHANGE UP (ref 150–400)
PO2 BLDV: 93 MMHG — HIGH (ref 25–45)
POTASSIUM SERPL-MCNC: 3.6 MMOL/L — SIGNIFICANT CHANGE UP (ref 3.5–5.3)
POTASSIUM SERPL-SCNC: 3.6 MMOL/L — SIGNIFICANT CHANGE UP (ref 3.5–5.3)
PROT SERPL-MCNC: 6.2 G/DL — LOW (ref 6.6–8.7)
PROT UR-MCNC: NEGATIVE — SIGNIFICANT CHANGE UP
PROTHROM AB SERPL-ACNC: 13.6 SEC — HIGH (ref 10.5–13.4)
RAPID RVP RESULT: SIGNIFICANT CHANGE UP
RBC # BLD: 3.96 M/UL — SIGNIFICANT CHANGE UP (ref 3.8–5.2)
RBC # FLD: 14.8 % — HIGH (ref 10.3–14.5)
RBC BLD AUTO: NORMAL — SIGNIFICANT CHANGE UP
RBC CASTS # UR COMP ASSIST: SIGNIFICANT CHANGE UP /HPF (ref 0–4)
SAO2 % BLDV: 97.9 % — SIGNIFICANT CHANGE UP
SARS-COV-2 RNA SPEC QL NAA+PROBE: SIGNIFICANT CHANGE UP
SODIUM SERPL-SCNC: 138 MMOL/L — SIGNIFICANT CHANGE UP (ref 135–145)
SP GR SPEC: 1.01 — SIGNIFICANT CHANGE UP (ref 1.01–1.02)
UROBILINOGEN FLD QL: NEGATIVE MG/DL — SIGNIFICANT CHANGE UP
WBC # BLD: 7.49 K/UL — SIGNIFICANT CHANGE UP (ref 3.8–10.5)
WBC # FLD AUTO: 7.49 K/UL — SIGNIFICANT CHANGE UP (ref 3.8–10.5)
WBC UR QL: SIGNIFICANT CHANGE UP /HPF (ref 0–5)

## 2022-09-26 PROCEDURE — 93010 ELECTROCARDIOGRAM REPORT: CPT

## 2022-09-26 PROCEDURE — 99223 1ST HOSP IP/OBS HIGH 75: CPT

## 2022-09-26 PROCEDURE — 70450 CT HEAD/BRAIN W/O DYE: CPT | Mod: 26,MA

## 2022-09-26 PROCEDURE — 99285 EMERGENCY DEPT VISIT HI MDM: CPT

## 2022-09-26 PROCEDURE — 71045 X-RAY EXAM CHEST 1 VIEW: CPT | Mod: 26

## 2022-09-26 RX ORDER — OLANZAPINE 15 MG/1
5 TABLET, FILM COATED ORAL DAILY
Refills: 0 | Status: DISCONTINUED | OUTPATIENT
Start: 2022-09-26 | End: 2022-09-27

## 2022-09-26 RX ORDER — SENNA PLUS 8.6 MG/1
2 TABLET ORAL AT BEDTIME
Refills: 0 | Status: DISCONTINUED | OUTPATIENT
Start: 2022-09-26 | End: 2022-09-28

## 2022-09-26 RX ORDER — METHADONE HYDROCHLORIDE 40 MG/1
20 TABLET ORAL
Refills: 0 | Status: DISCONTINUED | OUTPATIENT
Start: 2022-09-26 | End: 2022-09-28

## 2022-09-26 RX ORDER — PROCHLORPERAZINE MALEATE 5 MG
5 TABLET ORAL EVERY 6 HOURS
Refills: 0 | Status: DISCONTINUED | OUTPATIENT
Start: 2022-09-26 | End: 2022-09-28

## 2022-09-26 RX ORDER — METOPROLOL TARTRATE 50 MG
25 TABLET ORAL
Refills: 0 | Status: DISCONTINUED | OUTPATIENT
Start: 2022-09-26 | End: 2022-09-28

## 2022-09-26 RX ORDER — MEMANTINE HYDROCHLORIDE 10 MG/1
10 TABLET ORAL
Refills: 0 | Status: DISCONTINUED | OUTPATIENT
Start: 2022-09-26 | End: 2022-09-28

## 2022-09-26 RX ORDER — METHADONE HYDROCHLORIDE 40 MG/1
15 TABLET ORAL DAILY
Refills: 0 | Status: DISCONTINUED | OUTPATIENT
Start: 2022-09-26 | End: 2022-09-28

## 2022-09-26 RX ORDER — ONDANSETRON 8 MG/1
8 TABLET, FILM COATED ORAL EVERY 6 HOURS
Refills: 0 | Status: DISCONTINUED | OUTPATIENT
Start: 2022-09-26 | End: 2022-09-28

## 2022-09-26 RX ORDER — PANTOPRAZOLE SODIUM 20 MG/1
40 TABLET, DELAYED RELEASE ORAL
Refills: 0 | Status: DISCONTINUED | OUTPATIENT
Start: 2022-09-26 | End: 2022-09-28

## 2022-09-26 RX ORDER — FLUOXETINE HCL 10 MG
40 CAPSULE ORAL DAILY
Refills: 0 | Status: DISCONTINUED | OUTPATIENT
Start: 2022-09-26 | End: 2022-09-28

## 2022-09-26 RX ORDER — METHYLPHENIDATE HCL 5 MG
10 TABLET ORAL
Refills: 0 | Status: DISCONTINUED | OUTPATIENT
Start: 2022-09-26 | End: 2022-09-28

## 2022-09-26 RX ORDER — METHYLPHENIDATE HCL 5 MG
1 TABLET ORAL
Qty: 0 | Refills: 0 | DISCHARGE

## 2022-09-26 RX ORDER — POLYETHYLENE GLYCOL 3350 17 G/17G
17 POWDER, FOR SOLUTION ORAL DAILY
Refills: 0 | Status: DISCONTINUED | OUTPATIENT
Start: 2022-09-26 | End: 2022-09-28

## 2022-09-26 RX ORDER — DIAZEPAM 5 MG
5 TABLET ORAL EVERY 8 HOURS
Refills: 0 | Status: DISCONTINUED | OUTPATIENT
Start: 2022-09-26 | End: 2022-09-28

## 2022-09-26 RX ORDER — ONDANSETRON 8 MG/1
4 TABLET, FILM COATED ORAL EVERY 8 HOURS
Refills: 0 | Status: DISCONTINUED | OUTPATIENT
Start: 2022-09-26 | End: 2022-09-26

## 2022-09-26 RX ORDER — PROCHLORPERAZINE MALEATE 5 MG
5 TABLET ORAL EVERY 6 HOURS
Refills: 0 | Status: DISCONTINUED | OUTPATIENT
Start: 2022-09-26 | End: 2022-09-26

## 2022-09-26 RX ORDER — IPRATROPIUM BROMIDE 0.2 MG/ML
1 SOLUTION, NON-ORAL INHALATION EVERY 6 HOURS
Refills: 0 | Status: DISCONTINUED | OUTPATIENT
Start: 2022-09-26 | End: 2022-09-28

## 2022-09-26 RX ORDER — METHADONE HYDROCHLORIDE 40 MG/1
15 TABLET ORAL
Refills: 0 | Status: DISCONTINUED | OUTPATIENT
Start: 2022-09-26 | End: 2022-09-28

## 2022-09-26 RX ORDER — HYDROMORPHONE HYDROCHLORIDE 2 MG/ML
1 INJECTION INTRAMUSCULAR; INTRAVENOUS; SUBCUTANEOUS ONCE
Refills: 0 | Status: DISCONTINUED | OUTPATIENT
Start: 2022-09-26 | End: 2022-09-26

## 2022-09-26 RX ORDER — ENOXAPARIN SODIUM 100 MG/ML
40 INJECTION SUBCUTANEOUS EVERY 24 HOURS
Refills: 0 | Status: DISCONTINUED | OUTPATIENT
Start: 2022-09-26 | End: 2022-09-28

## 2022-09-26 RX ORDER — DEXAMETHASONE 0.5 MG/5ML
1 ELIXIR ORAL
Qty: 0 | Refills: 0 | DISCHARGE

## 2022-09-26 RX ORDER — CALCIUM GLUCONATE 100 MG/ML
1 VIAL (ML) INTRAVENOUS ONCE
Refills: 0 | Status: COMPLETED | OUTPATIENT
Start: 2022-09-26 | End: 2022-09-26

## 2022-09-26 RX ORDER — HYDROMORPHONE HYDROCHLORIDE 2 MG/ML
4 INJECTION INTRAMUSCULAR; INTRAVENOUS; SUBCUTANEOUS EVERY 4 HOURS
Refills: 0 | Status: DISCONTINUED | OUTPATIENT
Start: 2022-09-26 | End: 2022-09-28

## 2022-09-26 RX ORDER — ACETAMINOPHEN 500 MG
650 TABLET ORAL EVERY 6 HOURS
Refills: 0 | Status: DISCONTINUED | OUTPATIENT
Start: 2022-09-26 | End: 2022-09-28

## 2022-09-26 RX ORDER — COLCHICINE 0.6 MG
0.6 TABLET ORAL EVERY 12 HOURS
Refills: 0 | Status: DISCONTINUED | OUTPATIENT
Start: 2022-09-26 | End: 2022-09-28

## 2022-09-26 RX ORDER — POTASSIUM PHOSPHATE, MONOBASIC POTASSIUM PHOSPHATE, DIBASIC 236; 224 MG/ML; MG/ML
15 INJECTION, SOLUTION INTRAVENOUS ONCE
Refills: 0 | Status: COMPLETED | OUTPATIENT
Start: 2022-09-26 | End: 2022-09-26

## 2022-09-26 RX ORDER — ESCITALOPRAM OXALATE 10 MG/1
20 TABLET, FILM COATED ORAL DAILY
Refills: 0 | Status: DISCONTINUED | OUTPATIENT
Start: 2022-09-26 | End: 2022-09-26

## 2022-09-26 RX ORDER — ALBUTEROL 90 UG/1
2 AEROSOL, METERED ORAL EVERY 6 HOURS
Refills: 0 | Status: DISCONTINUED | OUTPATIENT
Start: 2022-09-26 | End: 2022-09-28

## 2022-09-26 RX ORDER — MODAFINIL 200 MG/1
100 TABLET ORAL DAILY
Refills: 0 | Status: DISCONTINUED | OUTPATIENT
Start: 2022-09-26 | End: 2022-09-28

## 2022-09-26 RX ADMIN — HYDROMORPHONE HYDROCHLORIDE 1 MILLIGRAM(S): 2 INJECTION INTRAMUSCULAR; INTRAVENOUS; SUBCUTANEOUS at 17:30

## 2022-09-26 RX ADMIN — SENNA PLUS 2 TABLET(S): 8.6 TABLET ORAL at 22:38

## 2022-09-26 RX ADMIN — ENOXAPARIN SODIUM 40 MILLIGRAM(S): 100 INJECTION SUBCUTANEOUS at 21:38

## 2022-09-26 RX ADMIN — HYDROMORPHONE HYDROCHLORIDE 1 MILLIGRAM(S): 2 INJECTION INTRAMUSCULAR; INTRAVENOUS; SUBCUTANEOUS at 22:45

## 2022-09-26 RX ADMIN — Medication 1 PUFF(S): at 21:04

## 2022-09-26 RX ADMIN — HYDROMORPHONE HYDROCHLORIDE 1 MILLIGRAM(S): 2 INJECTION INTRAMUSCULAR; INTRAVENOUS; SUBCUTANEOUS at 21:42

## 2022-09-26 RX ADMIN — Medication 200 MILLIGRAM(S): at 21:37

## 2022-09-26 RX ADMIN — OLANZAPINE 5 MILLIGRAM(S): 15 TABLET, FILM COATED ORAL at 21:37

## 2022-09-26 RX ADMIN — METHADONE HYDROCHLORIDE 15 MILLIGRAM(S): 40 TABLET ORAL at 22:37

## 2022-09-26 RX ADMIN — Medication 100 GRAM(S): at 16:40

## 2022-09-26 RX ADMIN — HYDROMORPHONE HYDROCHLORIDE 1 MILLIGRAM(S): 2 INJECTION INTRAMUSCULAR; INTRAVENOUS; SUBCUTANEOUS at 20:00

## 2022-09-26 RX ADMIN — POTASSIUM PHOSPHATE, MONOBASIC POTASSIUM PHOSPHATE, DIBASIC 62.5 MILLIMOLE(S): 236; 224 INJECTION, SOLUTION INTRAVENOUS at 21:38

## 2022-09-26 NOTE — ED ADULT TRIAGE NOTE - STATUS:
symmetric    Disposition: home    Condition: stable    Discharge Medications:   [facility]   Home Medication Instructions UOU:[id number]    Printed on:12/14/18 [id number]   Medication Information                      amLODIPine (NORVASC) 5 MG tablet  Take 1 tablet by mouth daily             aspirin 81 MG tablet  Take 81 mg by mouth daily             cephALEXin (KEFLEX) 500 MG capsule  Take 1 capsule by mouth 3 times daily for 10 days             Cholecalciferol (VITAMIN D) 2000 UNITS CAPS capsule  Take  by mouth.             ezetimibe (ZETIA) 10 MG tablet  Take 1 tablet by mouth daily             folic acid (FOLVITE) 832 MCG tablet  Take 400 mcg by mouth daily             Glucosamine HCl-MSM (GLUCOSAMINE-MSM PO)  Take 1 tablet by mouth 2 times daily              losartan (COZAAR) 100 MG tablet  Take 1 tablet by mouth daily             mometasone (NASONEX) 50 MCG/ACT nasal spray  2 sprays by Nasal route daily             omeprazole (PRILOSEC) 40 MG delayed release capsule  Take 1 capsule by mouth daily             pravastatin (PRAVACHOL) 80 MG tablet  Take 1 tablet by mouth every evening             solifenacin (VESICARE) 10 MG tablet  Take 5 mg by mouth daily             tamsulosin (FLOMAX) 0.4 MG capsule  Take 1 capsule by mouth daily                 Allergies:  No Known Allergies    Follow up Instructions: Follow-up with PCP: Radha Porter DO in 2-4 wk  To see Dr Shira Lowery in 1-2 wk for L ureteroscopy .       Total time spent on day of discharge including face-to-face visit, examination, documentation, counseling, preparation of discharge plans and followup, and discharge medicine reconciliation and presciptions is 33 minutes    Signed:  Treva Trujillo MD  12/14/2018 [employer]

## 2022-09-26 NOTE — ED ADULT NURSE REASSESSMENT NOTE - NS ED NURSE REASSESS COMMENT FT1
assumed care of pt at 1930- pt c/o of pain all over - pt breathing even and unlabored. pt calm and cooperative. denies any fevers and coughs. awaiting for MRI to be done.
RN spoke to MD Boyd per pt request. RN updated pt medication list and advised MD of pt concern regarding pain medication. pt states the last time she was admitted they did not handle her pain management well so pt is very concerned regarding her pain medication. pt awaiting MRI at this time.

## 2022-09-26 NOTE — ED ADULT TRIAGE NOTE - PATIENT ON (OXYGEN DELIVERY METHOD)
Post-Care Instructions: I reviewed with the patient in detail post-care instructions. Patient is to keep the biopsy site dry overnight, and then apply bacitracin twice daily until healed. Patient may apply hydrogen peroxide soaks to remove any crusting. room air

## 2022-09-26 NOTE — ED ADULT NURSE NOTE - CHIEF COMPLAINT
"General Surgery Admission H & P    Subjective     Omer Steele is a 56 y.o. male with a past medical history including alcoholism, currently in rehab, sz disorder, anxiety/depression, HTN, who presented to the ED today with complaint of lower abdominal pain associated with vomiting which started with mild symptoms last night, but this morning, escalated to severe lower abdominal pain. In the ED, WBC was elevated at 14, and CT scan showed acute appendicitis.     Medical History:   Past Medical History:   Diagnosis Date   • Alcoholism (CMS/HCC)    • Anxiety    • Depression    • High blood pressure        Surgical History:   Past Surgical History:   Procedure Laterality Date   • CATARACT EXTRACTION Left        Social History:   Social History     Social History Narrative   • Not on file       Family History: History reviewed. No pertinent family history.    Allergies: Patient has no known allergies.    Home Medications:  Not in a hospital admission.    Current Medications:  •  cefTRIAXone, 1 g, intravenous, Once  •  metroNIDAZOLE, 500 mg, intravenous, Once  •  sodium chloride, 1,000 mL, intravenous, Once  •  folic acid  •  levETIRAcetam  •  loratadine  •  melatonin  •  metoprolol succinate XL  •  multivitamin  •  oxazepam  •  thiamine  •  lisinopriL  •  sertraline    Review of Systems  Pertinent items are noted in HPI.    Objective     Physicial Exam  Visit Vitals  /83 (BP Location: Left upper arm, Patient Position: Lying)   Pulse 100   Temp 36.7 °C (98 °F) (Temporal)   Resp 18   Ht 1.803 m (5' 11\")   Wt 107 kg (235 lb)   SpO2 94%   BMI 32.78 kg/m²       General appearance: alert, appears stated age and cooperative  Head: normocephalic, without obvious abnormality, atraumatic  Eyes: conjunctivae/corneas clear. PERRL, EOM's grossly intact. Sclerae nonicteric.  Neck: trachea midline   Lungs: clear to auscultation bilaterally  Heart: regular rate and rhythm, no murmur  Abdomen: Soft, nondistended. TTP RLQ with " voluntary guarding.   Rectal: deferred  Extremities: extremities normal, warm and well-perfused; no cyanosis, clubbing, or edema  Skin: Skin color, texture, turgor normal. No rashes or lesions  Neurologic: Grossly normal    Labs  CBC Results       06/17/22     1015    WBC 14.28    RBC 4.90    HGB 15.6    HCT 44.3    MCV 90.4    MCH 31.8    MCHC 35.2            CMP Results       06/17/22     1015        K 3.7    Cl 101    CO2 24    Glucose 146    BUN 20    Creatinine 0.8    Calcium 9.5    Anion Gap 9    AST 16    ALT 22    Albumin 4.4    EGFR >60.0         Comment for K at 1015 on 06/17/22: Results obtained on plasma. Plasma Potassium values may be up to 0.4 mEQ/L less than serum values. The differences may be greater for patients with high platelet or white cell counts.            Imaging  CT a/p:   Lung bases: Mild areas of scarring and/or partial atelectasis at the lung bases  bilaterally.     Lower chest soft tissue: Atherosclerotic calcifications of the coronary  arteries. Trace pericardial effusion.     Liver: Unremarkable.     Spleen: Unremarkable.     Adrenals: Unremarkable.     Pancreas: Unremarkable.     Kidneys: Bilateral perinephric fat stranding. No hydronephrosis.     Gallbladder:  Gallstones in the gallbladder. No pericholecystic inflammatory  changes.     Retroperitoneal structures: Atherosclerotic calcifications of the abdominal  aorta and iliac arteries without evidence of aneurysm.     Bowel and mesentery: The appendix is dilated and fluid-filled with inflammatory  stranding in the adjacent peritoneal fat. The appendix measures up to 1.2 cm  transversely. There is mural thickening and mural edema involving the cecal tip  at the origin of the appendix. No dilated loops of large or small bowel to  suggest obstruction. No free intraperitoneal air. No bowel wall pneumatosis.  Fat-containing umbilical hernia.     Ascites: Mild amount of free fluid in the pelvis.     Lymph nodes: None  enlarged.     Reproductive Organs: Unremarkable.     Bladder: Unremarkable.     Bones: No suspicious lytic or blastic lesions.     --  IMPRESSION: Acute appendicitis.                                      Assessment and Plan  Acute appendicitis.   -- The patient will remain NPO, receive IV abx, and is scheduled for laparoscopic appendectomy, pending OR availability.     Pinky Mckeon MD     The patient is a 36y Female complaining of altered mental status.

## 2022-09-26 NOTE — H&P ADULT - HISTORY OF PRESENT ILLNESS
36y/oF PMH metastatic breast cancer 36y/oF PMH metastatic breast cancer with mets to brain, lung, liver and bone on active chemo s/p completion of cranial radiation, hx malignant pleural effusion 36y/oF PMH metastatic breast cancer with mets to brain, lung, liver and bone on active chemo s/p completion of cranial radiation, hx malignant pleural effusion (s/p chest tube 12/23/21), pericardial effusion w/tamponade s/p pericardiocentesis (12/28/21), pathologic compression fx, anxiety, recently discharged from [facility] (9/16/22) after admission for hypoxic respiratory failure, returning to ER with reported confusion, slurred speech x3 days. As per ER, family reports pt has been asking repetitive or nonsensical questions, got dressed yesterday believing they were going to a party, which they were not. Upon evaluation in ER, pt is oriented and recalls why she was told to return to the hospital, but does have noted dysarthria. Denies recent fevers, chills, abd pain, n/v/c/d, urinary changes, denies recent falls or LOC. +mild nocturnal cough/congestion. denies dyspnea, cp. denies recent changes in medication

## 2022-09-26 NOTE — H&P ADULT - NSHPPHYSICALEXAM_GEN_ALL_CORE
CONSTITUTIONAL: NAD  EYES: +EOMI  CARDIAC: Regular rate, regular rhythm.  normal +S1, S2  RESPIRATORY: Clear to auscultation bilaterally, respirations unlabored on RA  GASTROINTESTINAL: Abdomen soft, non-tender, no guarding.  NEUROLOGICAL: Alert and oriented, +dysarthria, moving all extremities, strength 5/5  SKIN: warm, dry  PSYCHIATRIC: calm, cooperative

## 2022-09-26 NOTE — H&P ADULT - NSHPLABSRESULTS_GEN_ALL_CORE
11.5   7.49  )-----------( 311      ( 26 Sep 2022 14:10 )             36.7   09-26    138  |  104  |  9.3  ----------------------------<  95  3.6   |  24.0  |  0.70    Ca    8.0<L>      26 Sep 2022 14:10  Phos  2.2     09-26  Mg     1.9     09-26    TPro  6.2<L>  /  Alb  3.4  /  TBili  0.3<L>  /  DBili  x   /  AST  33<H>  /  ALT  31  /  AlkPhos  78  09-26  < from: Xray Chest 1 View- PORTABLE-Urgent (Xray Chest 1 View- PORTABLE-Urgent .) (09.26.22 @ 15:27) >    INTERPRETATION:  AP chest on September 26, 2022 at 3:13 PM. Patient has   fever. There is history of cancer.    Heart magnified by technique. Right MediPort again noted.    On September 12 there is symmetric infiltration consistent with CHF which   has largely cleared on present study.  < from: CT Head No Cont (09.26.22 @ 14:54) >      Impression:  No evidence ofacute hemorrhage mass or mass effect.    < end of copied text >

## 2022-09-26 NOTE — H&P ADULT - ASSESSMENT
36y/oF PMH metastatic breast cancer with mets to brain, lung, liver and bone on active chemo s/p completion of cranial radiation, hx malignant pleural effusion (s/p chest tube 12/23/21), pericardial effusion w/tamponade s/p pericardiocentesis (12/28/21), pathologic compression fx, anxiety, recently discharged from [facility] (9/16/22) after admission for hypoxic respiratory failure, returning to ER with reported confusion, slurred speech x3 days admitted for further w/u.    AMS  Dysarthria   r/o CVA vs progression of known brain metastases vs ?polypharmacy  -admit to medicine   -CTH negative for acute hemorrhage, mass or mass effect   -less likely infectious  -possible medication interactions  -f/u RVP   -recent entero/rhinovirus   -s/p radiation, steroids   -CXR reviewed   -f/u MRI brain   -neuro checks   -neuro consulted     Hypophosphatemia, mild   -replete   -f/u repeat     Hx pericardial effusion   -cont colchicine 0.6mg bid   -f/u ekg    Hx Metastatic breast cancer   -cont pain control, anti-emetics   -f/u heme/onc ([place] blood and cancer)     Anxiety   -fluoxetine, need to clarify current dose     vte ppx: lovenox sq     pt's sister, Arianne, updated as per pt's request

## 2022-09-26 NOTE — ED PROVIDER NOTE - CLINICAL SUMMARY MEDICAL DECISION MAKING FREE TEXT BOX
Pt with slurred speech and AMS, suspect etiology to be primary neurologic/structural from known Hx of brain mets, will admit for MRI

## 2022-09-26 NOTE — ED ADULT NURSE NOTE - OBJECTIVE STATEMENT
pt care assumed at 1420, no apparent distress noted at this time. pt received A&OX3 sitting in bed with spouse at bedside. pt states her family reported that her speech was slurred, her words were not making sense, and she wasn't communicating appropriately to the conversation. pt states she has stage IV breast CA with mets to the brain. pt states she was sent by her oncologist today and did not receive chemo today. pt has right chest wall port. pt speech is clear at this time.

## 2022-09-26 NOTE — ED PROVIDER NOTE - OBJECTIVE STATEMENT
36 year old female with PMH metastatic breast CA presents with AMS. The family reports that the pt has been confused since Saturday. They report that she was asking repetitive or nonsensical questions, got dressed yesterday believing they had a party to go to, and is having delusional thoughts. No fevers, vomiting, dysuria, headaches, blurry vision. Of note, the pt was just recently admitted here for hypoxia, discharged on prn home O2, uses it at night, but is not c/o SOB currently.

## 2022-09-27 LAB
ALBUMIN SERPL ELPH-MCNC: 3.4 G/DL — SIGNIFICANT CHANGE UP (ref 3.3–5.2)
ALP SERPL-CCNC: 81 U/L — SIGNIFICANT CHANGE UP (ref 40–120)
ALT FLD-CCNC: 31 U/L — SIGNIFICANT CHANGE UP
ANION GAP SERPL CALC-SCNC: 10 MMOL/L — SIGNIFICANT CHANGE UP (ref 5–17)
AST SERPL-CCNC: 27 U/L — SIGNIFICANT CHANGE UP
BASOPHILS # BLD AUTO: 0.01 K/UL — SIGNIFICANT CHANGE UP (ref 0–0.2)
BASOPHILS NFR BLD AUTO: 0.2 % — SIGNIFICANT CHANGE UP (ref 0–2)
BILIRUB SERPL-MCNC: 0.3 MG/DL — LOW (ref 0.4–2)
BUN SERPL-MCNC: 6.3 MG/DL — LOW (ref 8–20)
CALCIUM SERPL-MCNC: 8.5 MG/DL — SIGNIFICANT CHANGE UP (ref 8.4–10.5)
CHLORIDE SERPL-SCNC: 107 MMOL/L — SIGNIFICANT CHANGE UP (ref 98–107)
CO2 SERPL-SCNC: 24 MMOL/L — SIGNIFICANT CHANGE UP (ref 22–29)
CREAT SERPL-MCNC: 0.55 MG/DL — SIGNIFICANT CHANGE UP (ref 0.5–1.3)
EGFR: 123 ML/MIN/1.73M2 — SIGNIFICANT CHANGE UP
EOSINOPHIL # BLD AUTO: 0.09 K/UL — SIGNIFICANT CHANGE UP (ref 0–0.5)
EOSINOPHIL NFR BLD AUTO: 2 % — SIGNIFICANT CHANGE UP (ref 0–6)
GLUCOSE SERPL-MCNC: 80 MG/DL — SIGNIFICANT CHANGE UP (ref 70–99)
HCT VFR BLD CALC: 37.6 % — SIGNIFICANT CHANGE UP (ref 34.5–45)
HGB BLD-MCNC: 11.9 G/DL — SIGNIFICANT CHANGE UP (ref 11.5–15.5)
IMM GRANULOCYTES NFR BLD AUTO: 0.4 % — SIGNIFICANT CHANGE UP (ref 0–0.9)
LYMPHOCYTES # BLD AUTO: 0.84 K/UL — LOW (ref 1–3.3)
LYMPHOCYTES # BLD AUTO: 18.7 % — SIGNIFICANT CHANGE UP (ref 13–44)
MAGNESIUM SERPL-MCNC: 2 MG/DL — SIGNIFICANT CHANGE UP (ref 1.6–2.6)
MCHC RBC-ENTMCNC: 29.4 PG — SIGNIFICANT CHANGE UP (ref 27–34)
MCHC RBC-ENTMCNC: 31.6 GM/DL — LOW (ref 32–36)
MCV RBC AUTO: 92.8 FL — SIGNIFICANT CHANGE UP (ref 80–100)
MONOCYTES # BLD AUTO: 0.62 K/UL — SIGNIFICANT CHANGE UP (ref 0–0.9)
MONOCYTES NFR BLD AUTO: 13.8 % — SIGNIFICANT CHANGE UP (ref 2–14)
NEUTROPHILS # BLD AUTO: 2.92 K/UL — SIGNIFICANT CHANGE UP (ref 1.8–7.4)
NEUTROPHILS NFR BLD AUTO: 64.9 % — SIGNIFICANT CHANGE UP (ref 43–77)
PHOSPHATE SERPL-MCNC: 3.6 MG/DL — SIGNIFICANT CHANGE UP (ref 2.4–4.7)
PLATELET # BLD AUTO: 282 K/UL — SIGNIFICANT CHANGE UP (ref 150–400)
POTASSIUM SERPL-MCNC: 3.9 MMOL/L — SIGNIFICANT CHANGE UP (ref 3.5–5.3)
POTASSIUM SERPL-SCNC: 3.9 MMOL/L — SIGNIFICANT CHANGE UP (ref 3.5–5.3)
PROCALCITONIN SERPL-MCNC: 0.05 NG/ML — SIGNIFICANT CHANGE UP (ref 0.02–0.1)
PROT SERPL-MCNC: 6.1 G/DL — LOW (ref 6.6–8.7)
RBC # BLD: 4.05 M/UL — SIGNIFICANT CHANGE UP (ref 3.8–5.2)
RBC # FLD: 14.7 % — HIGH (ref 10.3–14.5)
SODIUM SERPL-SCNC: 141 MMOL/L — SIGNIFICANT CHANGE UP (ref 135–145)
TROPONIN T SERPL-MCNC: <0.01 NG/ML — SIGNIFICANT CHANGE UP (ref 0–0.06)
WBC # BLD: 4.5 K/UL — SIGNIFICANT CHANGE UP (ref 3.8–10.5)
WBC # FLD AUTO: 4.5 K/UL — SIGNIFICANT CHANGE UP (ref 3.8–10.5)

## 2022-09-27 PROCEDURE — 95819 EEG AWAKE AND ASLEEP: CPT | Mod: 26

## 2022-09-27 PROCEDURE — 99233 SBSQ HOSP IP/OBS HIGH 50: CPT

## 2022-09-27 PROCEDURE — 99223 1ST HOSP IP/OBS HIGH 75: CPT

## 2022-09-27 RX ORDER — HYDROMORPHONE HYDROCHLORIDE 2 MG/ML
4 INJECTION INTRAMUSCULAR; INTRAVENOUS; SUBCUTANEOUS ONCE
Refills: 0 | Status: DISCONTINUED | OUTPATIENT
Start: 2022-09-27 | End: 2022-09-27

## 2022-09-27 RX ADMIN — Medication 25 MILLIGRAM(S): at 06:05

## 2022-09-27 RX ADMIN — METHADONE HYDROCHLORIDE 15 MILLIGRAM(S): 40 TABLET ORAL at 13:15

## 2022-09-27 RX ADMIN — HYDROMORPHONE HYDROCHLORIDE 4 MILLIGRAM(S): 2 INJECTION INTRAMUSCULAR; INTRAVENOUS; SUBCUTANEOUS at 08:10

## 2022-09-27 RX ADMIN — Medication 1 PUFF(S): at 21:31

## 2022-09-27 RX ADMIN — Medication 10 MILLIGRAM(S): at 17:08

## 2022-09-27 RX ADMIN — Medication 0.6 MILLIGRAM(S): at 18:20

## 2022-09-27 RX ADMIN — HYDROMORPHONE HYDROCHLORIDE 4 MILLIGRAM(S): 2 INJECTION INTRAMUSCULAR; INTRAVENOUS; SUBCUTANEOUS at 16:37

## 2022-09-27 RX ADMIN — Medication 40 MILLIGRAM(S): at 16:14

## 2022-09-27 RX ADMIN — MODAFINIL 100 MILLIGRAM(S): 200 TABLET ORAL at 13:22

## 2022-09-27 RX ADMIN — MEMANTINE HYDROCHLORIDE 10 MILLIGRAM(S): 10 TABLET ORAL at 06:06

## 2022-09-27 RX ADMIN — Medication 0.6 MILLIGRAM(S): at 06:06

## 2022-09-27 RX ADMIN — METHADONE HYDROCHLORIDE 15 MILLIGRAM(S): 40 TABLET ORAL at 21:07

## 2022-09-27 RX ADMIN — ENOXAPARIN SODIUM 40 MILLIGRAM(S): 100 INJECTION SUBCUTANEOUS at 21:07

## 2022-09-27 RX ADMIN — SENNA PLUS 2 TABLET(S): 8.6 TABLET ORAL at 21:07

## 2022-09-27 RX ADMIN — HYDROMORPHONE HYDROCHLORIDE 4 MILLIGRAM(S): 2 INJECTION INTRAMUSCULAR; INTRAVENOUS; SUBCUTANEOUS at 01:06

## 2022-09-27 RX ADMIN — Medication 200 MILLIGRAM(S): at 21:07

## 2022-09-27 RX ADMIN — HYDROMORPHONE HYDROCHLORIDE 4 MILLIGRAM(S): 2 INJECTION INTRAMUSCULAR; INTRAVENOUS; SUBCUTANEOUS at 18:22

## 2022-09-27 RX ADMIN — PANTOPRAZOLE SODIUM 40 MILLIGRAM(S): 20 TABLET, DELAYED RELEASE ORAL at 06:05

## 2022-09-27 RX ADMIN — HYDROMORPHONE HYDROCHLORIDE 4 MILLIGRAM(S): 2 INJECTION INTRAMUSCULAR; INTRAVENOUS; SUBCUTANEOUS at 21:59

## 2022-09-27 RX ADMIN — OLANZAPINE 5 MILLIGRAM(S): 15 TABLET, FILM COATED ORAL at 16:14

## 2022-09-27 RX ADMIN — POLYETHYLENE GLYCOL 3350 17 GRAM(S): 17 POWDER, FOR SOLUTION ORAL at 15:15

## 2022-09-27 RX ADMIN — HYDROMORPHONE HYDROCHLORIDE 4 MILLIGRAM(S): 2 INJECTION INTRAMUSCULAR; INTRAVENOUS; SUBCUTANEOUS at 22:59

## 2022-09-27 RX ADMIN — Medication 10 MILLIGRAM(S): at 06:05

## 2022-09-27 RX ADMIN — MEMANTINE HYDROCHLORIDE 10 MILLIGRAM(S): 10 TABLET ORAL at 18:20

## 2022-09-27 RX ADMIN — Medication 200 MILLIGRAM(S): at 15:14

## 2022-09-27 RX ADMIN — HYDROMORPHONE HYDROCHLORIDE 4 MILLIGRAM(S): 2 INJECTION INTRAMUSCULAR; INTRAVENOUS; SUBCUTANEOUS at 08:40

## 2022-09-27 RX ADMIN — HYDROMORPHONE HYDROCHLORIDE 4 MILLIGRAM(S): 2 INJECTION INTRAMUSCULAR; INTRAVENOUS; SUBCUTANEOUS at 17:08

## 2022-09-27 RX ADMIN — Medication 25 MILLIGRAM(S): at 17:14

## 2022-09-27 RX ADMIN — Medication 200 MILLIGRAM(S): at 06:05

## 2022-09-27 RX ADMIN — HYDROMORPHONE HYDROCHLORIDE 4 MILLIGRAM(S): 2 INJECTION INTRAMUSCULAR; INTRAVENOUS; SUBCUTANEOUS at 15:22

## 2022-09-27 NOTE — EEG REPORT - NS EEG TEXT BOX
[facility]   COMPREHENSIVE EPILEPSY CENTER   REPORT OF ROUTINE VIDEO EEG     [facility]: [address], [place] [address], Ph#: [phone]  [facility]: [address], [place] [address], Ph#: [phone]  [facility]: [address] [place] [address], Ph#: [phone]    Patient Name: NATIVIDAD MYERS  Age and : 36y ([birth year])  MRN #: [id number]  Location: [address] [address]  Referring Physician: Dianne Cruz    Study Date: 22    _____________________________________________________________  TECHNICAL INFORMATION    Placement and Labeling of Electrodes:  The EEG was performed utilizing 20 channels referential EEG connections (coronal over temporal over parasagittal montage) using all standard 10-20 electrode placements with EKG.  Recording was at a sampling rate of 256 samples per second per channel.  Time synchronized digital video recording was done simultaneously with EEG recording.  A low light infrared camera was used for low light recording.  Juan and seizure detection algorithms were utilized.    _____________________________________________________________  HISTORY    Patient is a 36y old  Female who presents with a chief complaint of confusion (27 Sep 2022 11:56)      PERTINENT MEDICATION:  pregabalin 200 milliGRAM(s) Oral three times a day    _____________________________________________________________  STUDY INTERPRETATION    Findings: The background was continuous and reactive. During wakefulness, the posterior dominant rhythm consisted of symmetric, well-modulated 8 Hz activity, with amplitude to 30 uV, that attenuated to eye opening.      Background Slowing:  Excess diffuse polymorphic delta slowing.    Focal Slowing:   None were present.    Sleep Background:  Drowsiness and stage II sleep transients were not recorded.    Other Non-Epileptiform Findings:  None were present.    Interictal Epileptiform Activity:   None were present.    Events:  No event or seizure recorded.    Activation Procedures:   Hyperventilation was not performed.    Photic stimulation was performed and did not elicit any abnormality.     Artifacts:  Intermittent myogenic and movement artifacts were noted.    ECG:  The heart rate on single channel ECG was predominantly between 70-80 BPM.    _____________________________________________________________  EEG SUMMARY/CLASSIFICATION    Abnormal EEG in the awake state.  - Mild to moderate generalized slowing.    _____________________________________________________________  EEG IMPRESSION/CLINICAL CORRELATE    Abnormal EEG study.  1. Mild to moderate nonspecific diffuse or multifocal cerebral dysfunction.   2. No epileptiform pattern or seizure seen.    _____________________________________________________________    Ulises Almodovar MD  Director, Epilepsy/EMU - [facility]

## 2022-09-27 NOTE — CONSULT NOTE ADULT - SUBJECTIVE AND OBJECTIVE BOX
36y/oF PMH metastatic breast cancer with mets to brain, lung, liver and bone on active chemo s/p completion of cranial radiation, hx malignant pleural effusion (s/p chest tube 12/23/21), pericardial effusion w/tamponade s/p pericardiocentesis (12/28/21), pathologic compression fx, anxiety, recently discharged from [facility] (9/16/22) after admission for hypoxic respiratory failure, returning to ER with reported confusion, slurred speech x3 days. As per ER, family reports pt has been asking repetitive or nonsensical questions, got dressed yesterday believing they were going to a party, which they were not. Upon evaluation in ER, pt is oriented and recalls why she was told to return to the hospital, but does have noted dysarthria.     Physical Exam: CONSTITUTIONAL: NAD  EYES: +EOMI  CARDIAC: Regular rate, regular rhythm.  normal +S1, S2  RESPIRATORY: Clear to auscultation bilaterally, respirations unlabored on RA  GASTROINTESTINAL: Abdomen soft, non-tender, no guarding.  NEUROLOGICAL: Alert and oriented, +dysarthria, moving all extremities, strength 5/5  SKIN: warm, dry  PSYCHIATRIC: calm, cooperative    CBC:            11.9   4.50  )-----------( 282      ( 09-27-22 @ 05:52 )             37.6     Chem:         ( 09-27-22 @ 05:52 )    141  |  107  |  6.3<L>  ----------------------------<  80  3.9   |  24.0  |  0.55    Liver Functions: ( 09-27-22 @ 05:52 )  Alb: 3.4 g/dL / Pro: 6.1 g/dL / ALK PHOS: 81 U/L / ALT: 31 U/L / AST: 27 U/L / GGT: x          Physical Exam: CONSTITUTIONAL: NAD  EYES: +EOMI  CARDIAC: Regular rate, regular rhythm.  normal +S1, S2  RESPIRATORY: Clear to auscultation bilaterally, respirations unlabored on RA  GASTROINTESTINAL: Abdomen soft, non-tender, no guarding.  NEUROLOGICAL: Alert and oriented, +dysarthria, moving all extremities, strength 5/5  SKIN: warm, dry      PSYCHIATRIC: calm, cooperative

## 2022-09-27 NOTE — CONSULT NOTE ADULT - SUBJECTIVE AND OBJECTIVE BOX
[facility]                                     Neurology at [place]                                 Raul Florentino, & Naveed                                  [address]                                        [place], [place], [address]                                             [phone]    CC: AMS and dysrtrhria  HPI:  The patient is a 36y Female who presented with a several day history of confusion and slurred speech.  this is per the chart and per family, the patient does not think she was confussed or slurring. Currently she has resolved.  She has h/o stage 4 breast cancer with brain mets, s/p WBXRT last month.  Neurology is asked to assess.    PAST MEDICAL & SURGICAL HISTORY:  H/O compression fracture of spine      Anxiety      Metastatic breast cancer      H/O pleural effusion      Pericardial effusion      S/P tonsillectomy      H/O chest tube placement  12/23/21      S/P pericardiocentesis  12/28/21      MEDICATIONS  (STANDING):  colchicine 0.6 milliGRAM(s) Oral every 12 hours  enoxaparin Injectable 40 milliGRAM(s) SubCutaneous every 24 hours  FLUoxetine 40 milliGRAM(s) Oral daily  ipratropium 17 MICROgram(s) HFA Inhaler 1 Puff(s) Inhalation every 6 hours  memantine 10 milliGRAM(s) Oral two times a day  methadone    Tablet 15 milliGRAM(s) Oral daily  methadone    Tablet 20 milliGRAM(s) Oral <User Schedule>  methadone    Tablet 15 milliGRAM(s) Oral <User Schedule>  methylphenidate 10 milliGRAM(s) Oral two times a day  metoprolol tartrate 25 milliGRAM(s) Oral two times a day  modafinil 100 milliGRAM(s) Oral daily  OLANZapine 5 milliGRAM(s) Oral daily  pantoprazole    Tablet 40 milliGRAM(s) Oral before breakfast  polyethylene glycol 3350 17 Gram(s) Oral daily  pregabalin 200 milliGRAM(s) Oral three times a day  senna 2 Tablet(s) Oral at bedtime    MEDICATIONS  (PRN):  acetaminophen     Tablet .. 650 milliGRAM(s) Oral every 6 hours PRN Temp greater or equal to 38C (100.4F), Mild Pain (1 - 3)  ALBUTerol    90 MICROgram(s) HFA Inhaler 2 Puff(s) Inhalation every 6 hours PRN Shortness of Breath and/or Wheezing  aluminum hydroxide/magnesium hydroxide/simethicone Suspension 30 milliLiter(s) Oral every 4 hours PRN Dyspepsia  diazepam    Tablet 5 milliGRAM(s) Oral every 8 hours PRN anxiety  HYDROmorphone   Tablet 4 milliGRAM(s) Oral every 4 hours PRN Severe Pain (7 - 10)  ondansetron Injectable 8 milliGRAM(s) IV Push every 6 hours PRN Nausea and/or Vomiting  prochlorperazine   Tablet 5 milliGRAM(s) Oral every 6 hours PRN nausea / vomiting      Allergies    Perjeta (Other (Severe))  pertuzumab (Other (Severe))    Intolerances    SOCIAL HISTORY:  no tob,   no alcohol   no drugs    FAMILY HISTORY:  FH: CVA (cerebrovascular accident)- mother      ROS: 14 point ROS negative other than what is present in HPI or below    Vital Signs Last 24 Hrs  T(C): 36.7 (27 Sep 2022 08:14), Max: 37.1 (26 Sep 2022 13:24)  T(F): 98.1 (27 Sep 2022 08:14), Max: 98.7 (26 Sep 2022 13:24)  HR: 74 (27 Sep 2022 08:14) (72 - 102)  BP: 104/72 (27 Sep 2022 08:15) (95/66 - 132/81)  BP(mean): 83 (27 Sep 2022 08:15) (83 - 83)  RR: 20 (27 Sep 2022 08:14) (17 - 20)  SpO2: 99% (27 Sep 2022 08:14) (94% - 99%)    Parameters below as of 27 Sep 2022 08:14  Patient On (Oxygen Delivery Method): room air      General: NAD    Detailed Neurologic Exam:    Mental status: The patient is awake and alert and has normal attention span.  The patient is fully oriented in 3 spheres. The patient is oriented to current events. The patient is able to name objects, follow commands, repeat sentences. No dysarthria    Cranial nerves: Pupils equal and react symmetrically to light. There is no visual field deficit to confrontation. Extraocular motion is full with no nystagmus. There is no ptosis. Facial sensation is intact. Facial musculature is symmetric. Palate elevates symmetrically. Tongue is midline.    Motor: There is normal bulk and tone.  There is no tremor.  Strength is 5/5 in the right arm and leg.   Strength is 5/5 in the left arm and leg.    Sensation: Intact to light touch and pin in 4 extremities    Reflexes: 1+ throughout and plantar responses are flexor.    Cerebellar: There is no dysmetria on finger to nose testing.    Gait : deferred    LABS:                         11.9   4.50  )-----------( 282      ( 27 Sep 2022 05:52 )             37.6       09-27    141  |  107  |  6.3<L>  ----------------------------<  80  3.9   |  24.0  |  0.55    Ca    8.5      27 Sep 2022 05:52  Phos  3.6     09-27  Mg     2.0     09-27    TPro  6.1<L>  /  Alb  3.4  /  TBili  0.3<L>  /  DBili  x   /  AST  27  /  ALT  31  /  AlkPhos  81  09-27      PT/INR - ( 26 Sep 2022 14:10 )   PT: 13.6 sec;   INR: 1.17 ratio         PTT - ( 26 Sep 2022 14:10 )  PTT:34.1 sec    RADIOLOGY & ADDITIONAL STUDIES (independently reviewed unless otherwise noted):  Head CT did not show acute CVA, mass or blood

## 2022-09-27 NOTE — CONSULT NOTE ADULT - ASSESSMENT
36y/oF PMH metastatic breast cancer with mets to brain, lung, liver and bone on active chemo s/p completion of cranial radiation, hx malignant pleural effusion (s/p chest tube 12/23/21), pericardial effusion w/tamponade s/p pericardiocentesis (12/28/21), pathologic compression fx, anxiety, recently discharged from [facility] (9/16/22) after admission for hypoxic respiratory failure, returning to ER with reported confusion, slurred speech x3 days admitted for further w/u.    Hx Metastatic breast cancer   -cont pain control, anti-emetics   -f/u heme/onc at [facility] with Dr Chapa  -Last Enhertu on 8/29/22  -Completed WBRT / 7/29/22    AMS  Dysarthria   r/o CVA vs progression of known brain metastases vs ?polypharmacy  -CTH negative for acute hemorrhage, mass or mass effect   -recent entero/rhinovirus   -s/p radiation, steroids   -f/u MRI brain   -neuro checks   -neuro consulted     Hypophosphatemia, mild   -replete   -f/u repeat     Hx pericardial effusion   -cont colchicine 0.6mg bid   -f/u ekg    Will follow 36y/oF PMH metastatic breast cancer with mets to brain, lung, liver and bone on active chemo s/p completion of cranial radiation, hx malignant pleural effusion (s/p chest tube 12/23/21), pericardial effusion w/tamponade s/p pericardiocentesis (12/28/21), pathologic compression fx, anxiety, recently discharged from [facility] (9/16/22) after admission for hypoxic respiratory failure, returning to ER with reported confusion, slurred speech x3 days admitted for further w/u.    Hx Metastatic breast cancer   -cont pain control, anti-emetics   -f/u heme/onc at [facility] with Dr Chapa  -Last Enhertu on 8/29/22  -Completed WBRT / 7/29/22  -PLEASE GET MRI CERVICAL, THORACIC and LUMBAR spine with contrast during inpatient stay (it was being planned by oncology on outpatient basis)    AMS  Dysarthria   r/o CVA vs progression of known brain metastases vs ?polypharmacy  -CTH negative for acute hemorrhage, mass or mass effect   -recent entero/rhinovirus   -s/p radiation, steroids   -f/u MRI brain   -neuro checks   -neuro consulted     Hypophosphatemia, mild   -replete   -f/u repeat     Hx pericardial effusion   -cont colchicine 0.6mg bid   -f/u ekg    Will follow

## 2022-09-27 NOTE — PROVIDER CONTACT NOTE (MEDICATION) - ACTION/TREATMENT ORDERED:
MD advised she will enter a one-time order of dilaudid 4mg PO and to call if the patient continues to complain of pain after 2 hours.

## 2022-09-27 NOTE — CONSULT NOTE ADULT - ASSESSMENT
The patient is a 36y Female who is followed by neurology because of transient AMS and slurred speech.    Possibly due to medications  possibly seizure from mets  less likely leptomeningeal spread as she has resolved    Agree with MRI brain wwo contrast  check EEG    will follow with you    Shashi Carter MD PhD   [id number]

## 2022-09-28 ENCOUNTER — TRANSCRIPTION ENCOUNTER (OUTPATIENT)
Age: 37
End: 2022-09-28

## 2022-09-28 VITALS
OXYGEN SATURATION: 93 % | HEART RATE: 76 BPM | SYSTOLIC BLOOD PRESSURE: 100 MMHG | RESPIRATION RATE: 17 BRPM | DIASTOLIC BLOOD PRESSURE: 67 MMHG | TEMPERATURE: 98 F

## 2022-09-28 LAB
ANION GAP SERPL CALC-SCNC: 13 MMOL/L — SIGNIFICANT CHANGE UP (ref 5–17)
BUN SERPL-MCNC: 6.8 MG/DL — LOW (ref 8–20)
CALCIUM SERPL-MCNC: 8.4 MG/DL — SIGNIFICANT CHANGE UP (ref 8.4–10.5)
CHLORIDE SERPL-SCNC: 106 MMOL/L — SIGNIFICANT CHANGE UP (ref 98–107)
CO2 SERPL-SCNC: 20 MMOL/L — LOW (ref 22–29)
CREAT SERPL-MCNC: 0.61 MG/DL — SIGNIFICANT CHANGE UP (ref 0.5–1.3)
EGFR: 119 ML/MIN/1.73M2 — SIGNIFICANT CHANGE UP
GLUCOSE SERPL-MCNC: 71 MG/DL — SIGNIFICANT CHANGE UP (ref 70–99)
HCT VFR BLD CALC: 40.6 % — SIGNIFICANT CHANGE UP (ref 34.5–45)
HGB BLD-MCNC: 12.5 G/DL — SIGNIFICANT CHANGE UP (ref 11.5–15.5)
MCHC RBC-ENTMCNC: 29.5 PG — SIGNIFICANT CHANGE UP (ref 27–34)
MCHC RBC-ENTMCNC: 30.8 GM/DL — LOW (ref 32–36)
MCV RBC AUTO: 95.8 FL — SIGNIFICANT CHANGE UP (ref 80–100)
PLATELET # BLD AUTO: 249 K/UL — SIGNIFICANT CHANGE UP (ref 150–400)
POTASSIUM SERPL-MCNC: 3.7 MMOL/L — SIGNIFICANT CHANGE UP (ref 3.5–5.3)
POTASSIUM SERPL-SCNC: 3.7 MMOL/L — SIGNIFICANT CHANGE UP (ref 3.5–5.3)
RBC # BLD: 4.24 M/UL — SIGNIFICANT CHANGE UP (ref 3.8–5.2)
RBC # FLD: 14.8 % — HIGH (ref 10.3–14.5)
SODIUM SERPL-SCNC: 139 MMOL/L — SIGNIFICANT CHANGE UP (ref 135–145)
WBC # BLD: 4.23 K/UL — SIGNIFICANT CHANGE UP (ref 3.8–10.5)
WBC # FLD AUTO: 4.23 K/UL — SIGNIFICANT CHANGE UP (ref 3.8–10.5)

## 2022-09-28 PROCEDURE — 84145 PROCALCITONIN (PCT): CPT

## 2022-09-28 PROCEDURE — 85027 COMPLETE CBC AUTOMATED: CPT

## 2022-09-28 PROCEDURE — 95714 VEEG EA 12-26 HR UNMNTR: CPT

## 2022-09-28 PROCEDURE — 83735 ASSAY OF MAGNESIUM: CPT

## 2022-09-28 PROCEDURE — 36415 COLL VENOUS BLD VENIPUNCTURE: CPT

## 2022-09-28 PROCEDURE — 72147 MRI CHEST SPINE W/DYE: CPT

## 2022-09-28 PROCEDURE — 93005 ELECTROCARDIOGRAM TRACING: CPT

## 2022-09-28 PROCEDURE — 94640 AIRWAY INHALATION TREATMENT: CPT

## 2022-09-28 PROCEDURE — 84484 ASSAY OF TROPONIN QUANT: CPT

## 2022-09-28 PROCEDURE — 72142 MRI NECK SPINE W/DYE: CPT | Mod: 26

## 2022-09-28 PROCEDURE — 84100 ASSAY OF PHOSPHORUS: CPT

## 2022-09-28 PROCEDURE — 99233 SBSQ HOSP IP/OBS HIGH 50: CPT

## 2022-09-28 PROCEDURE — 72147 MRI CHEST SPINE W/DYE: CPT | Mod: 26

## 2022-09-28 PROCEDURE — 84702 CHORIONIC GONADOTROPIN TEST: CPT

## 2022-09-28 PROCEDURE — 85025 COMPLETE CBC W/AUTO DIFF WBC: CPT

## 2022-09-28 PROCEDURE — 72142 MRI NECK SPINE W/DYE: CPT

## 2022-09-28 PROCEDURE — 99239 HOSP IP/OBS DSCHRG MGMT >30: CPT

## 2022-09-28 PROCEDURE — 72149 MRI LUMBAR SPINE W/DYE: CPT | Mod: 26

## 2022-09-28 PROCEDURE — 71045 X-RAY EXAM CHEST 1 VIEW: CPT

## 2022-09-28 PROCEDURE — 70553 MRI BRAIN STEM W/O & W/DYE: CPT | Mod: 26

## 2022-09-28 PROCEDURE — 96374 THER/PROPH/DIAG INJ IV PUSH: CPT

## 2022-09-28 PROCEDURE — 81001 URINALYSIS AUTO W/SCOPE: CPT

## 2022-09-28 PROCEDURE — 85610 PROTHROMBIN TIME: CPT

## 2022-09-28 PROCEDURE — 70553 MRI BRAIN STEM W/O & W/DYE: CPT | Mod: MA

## 2022-09-28 PROCEDURE — 94760 N-INVAS EAR/PLS OXIMETRY 1: CPT

## 2022-09-28 PROCEDURE — 85730 THROMBOPLASTIN TIME PARTIAL: CPT

## 2022-09-28 PROCEDURE — 99285 EMERGENCY DEPT VISIT HI MDM: CPT | Mod: 25

## 2022-09-28 PROCEDURE — 0225U NFCT DS DNA&RNA 21 SARSCOV2: CPT

## 2022-09-28 PROCEDURE — 80053 COMPREHEN METABOLIC PANEL: CPT

## 2022-09-28 PROCEDURE — 82803 BLOOD GASES ANY COMBINATION: CPT

## 2022-09-28 PROCEDURE — 70450 CT HEAD/BRAIN W/O DYE: CPT | Mod: MA

## 2022-09-28 PROCEDURE — 95819 EEG AWAKE AND ASLEEP: CPT

## 2022-09-28 PROCEDURE — 72149 MRI LUMBAR SPINE W/DYE: CPT

## 2022-09-28 PROCEDURE — 82140 ASSAY OF AMMONIA: CPT

## 2022-09-28 PROCEDURE — 95700 EEG CONT REC W/VID EEG TECH: CPT

## 2022-09-28 PROCEDURE — 80048 BASIC METABOLIC PNL TOTAL CA: CPT

## 2022-09-28 RX ORDER — OLANZAPINE 15 MG/1
1 TABLET, FILM COATED ORAL
Qty: 0 | Refills: 0 | DISCHARGE

## 2022-09-28 RX ORDER — METHADONE HYDROCHLORIDE 40 MG/1
15 TABLET ORAL
Refills: 0 | Status: DISCONTINUED | OUTPATIENT
Start: 2022-09-28 | End: 2022-09-28

## 2022-09-28 RX ORDER — METHADONE HYDROCHLORIDE 40 MG/1
0 TABLET ORAL
Qty: 0 | Refills: 0 | DISCHARGE

## 2022-09-28 RX ORDER — ESCITALOPRAM OXALATE 10 MG/1
1 TABLET, FILM COATED ORAL
Qty: 0 | Refills: 0 | DISCHARGE

## 2022-09-28 RX ORDER — FLUOXETINE HCL 10 MG
2 CAPSULE ORAL
Qty: 0 | Refills: 0 | DISCHARGE

## 2022-09-28 RX ORDER — ZOLPIDEM TARTRATE 10 MG/1
1 TABLET ORAL
Qty: 0 | Refills: 0 | DISCHARGE

## 2022-09-28 RX ORDER — IPRATROPIUM BROMIDE 0.2 MG/ML
0 SOLUTION, NON-ORAL INHALATION
Qty: 0 | Refills: 0 | DISCHARGE

## 2022-09-28 RX ORDER — ONDANSETRON 8 MG/1
1 TABLET, FILM COATED ORAL
Qty: 0 | Refills: 0 | DISCHARGE

## 2022-09-28 RX ORDER — FLUOXETINE HCL 10 MG
1 CAPSULE ORAL
Qty: 0 | Refills: 0 | DISCHARGE

## 2022-09-28 RX ADMIN — Medication 0.6 MILLIGRAM(S): at 17:10

## 2022-09-28 RX ADMIN — Medication 40 MILLIGRAM(S): at 11:02

## 2022-09-28 RX ADMIN — HYDROMORPHONE HYDROCHLORIDE 4 MILLIGRAM(S): 2 INJECTION INTRAMUSCULAR; INTRAVENOUS; SUBCUTANEOUS at 10:57

## 2022-09-28 RX ADMIN — HYDROMORPHONE HYDROCHLORIDE 4 MILLIGRAM(S): 2 INJECTION INTRAMUSCULAR; INTRAVENOUS; SUBCUTANEOUS at 11:57

## 2022-09-28 RX ADMIN — Medication 200 MILLIGRAM(S): at 14:21

## 2022-09-28 RX ADMIN — MEMANTINE HYDROCHLORIDE 10 MILLIGRAM(S): 10 TABLET ORAL at 05:20

## 2022-09-28 RX ADMIN — MODAFINIL 100 MILLIGRAM(S): 200 TABLET ORAL at 11:00

## 2022-09-28 RX ADMIN — Medication 0.6 MILLIGRAM(S): at 05:19

## 2022-09-28 RX ADMIN — METHADONE HYDROCHLORIDE 15 MILLIGRAM(S): 40 TABLET ORAL at 11:00

## 2022-09-28 RX ADMIN — Medication 10 MILLIGRAM(S): at 05:19

## 2022-09-28 RX ADMIN — PANTOPRAZOLE SODIUM 40 MILLIGRAM(S): 20 TABLET, DELAYED RELEASE ORAL at 05:19

## 2022-09-28 RX ADMIN — Medication 200 MILLIGRAM(S): at 05:19

## 2022-09-28 RX ADMIN — MEMANTINE HYDROCHLORIDE 10 MILLIGRAM(S): 10 TABLET ORAL at 17:10

## 2022-09-28 RX ADMIN — Medication 25 MILLIGRAM(S): at 05:19

## 2022-09-28 RX ADMIN — Medication 1 PUFF(S): at 14:20

## 2022-09-28 RX ADMIN — Medication 1 PUFF(S): at 08:00

## 2022-09-28 RX ADMIN — METHADONE HYDROCHLORIDE 15 MILLIGRAM(S): 40 TABLET ORAL at 14:21

## 2022-09-28 RX ADMIN — HYDROMORPHONE HYDROCHLORIDE 4 MILLIGRAM(S): 2 INJECTION INTRAMUSCULAR; INTRAVENOUS; SUBCUTANEOUS at 06:20

## 2022-09-28 RX ADMIN — HYDROMORPHONE HYDROCHLORIDE 4 MILLIGRAM(S): 2 INJECTION INTRAMUSCULAR; INTRAVENOUS; SUBCUTANEOUS at 16:13

## 2022-09-28 RX ADMIN — Medication 10 MILLIGRAM(S): at 17:10

## 2022-09-28 RX ADMIN — HYDROMORPHONE HYDROCHLORIDE 4 MILLIGRAM(S): 2 INJECTION INTRAMUSCULAR; INTRAVENOUS; SUBCUTANEOUS at 05:20

## 2022-09-28 RX ADMIN — HYDROMORPHONE HYDROCHLORIDE 4 MILLIGRAM(S): 2 INJECTION INTRAMUSCULAR; INTRAVENOUS; SUBCUTANEOUS at 17:13

## 2022-09-28 NOTE — DISCHARGE NOTE PROVIDER - NSCORESITESY/N_GEN_A_CORE_RD
----- Message from SHAILESH Ruiz sent at 10/8/2021 10:43 AM CDT -----  Patient still has a thyroid US pending, so okay to wait to call until that is resulted as well. His RUQ US is showing an enlarged liver with diffuse hepatic steatosis (fatty liver).  It would probably be a good idea for him to see hepatology for possible additional work up given the associated elevated LFTs. I am fine waiting to get Dr. Lee's input on this when she returns next week. Everything else on US looks okay   No

## 2022-09-28 NOTE — DISCHARGE NOTE PROVIDER - ATTENDING DISCHARGE PHYSICAL EXAMINATION:
Vital Signs Last 24 Hrs  T(C): 36.2 (28 Sep 2022 10:39), Max: 37.1 (28 Sep 2022 05:17)  T(F): 97.1 (28 Sep 2022 10:39), Max: 98.7 (28 Sep 2022 05:17)  HR: 86 (28 Sep 2022 10:39) (67 - 86)  BP: 105/73 (28 Sep 2022 10:39) (100/65 - 109/75)  BP(mean): 72 (28 Sep 2022 05:17) (72 - 86)  RR: 17 (28 Sep 2022 10:39) (17 - 20)  SpO2: 93% (28 Sep 2022 10:39) (92% - 95%)    Parameters below as of 28 Sep 2022 11:00  Patient On (Oxygen Delivery Method): room air    CONSTITUTIONAL: NAD, chronically ill appearing   ENMT: Moist oral mucosa, no pharyngeal injection or exudates; normal dentition; No JVD  RESPIRATORY: Normal respiratory effort; lungs are clear to auscultation bilaterally  CARDIOVASCULAR: Regular rate and rhythm, normal S1 and S2, no murmur/rub/gallop; No lower extremity edema; Peripheral pulses are 2+ bilaterally  ABDOMEN: Nontender to palpation, normoactive bowel sounds, no rebound/guarding; No hepatosplenomegaly  MUSCLOSKELETAL:  no clubbing or cyanosis of digits; no joint swelling or tenderness to palpation  PSYCH: A+O to person, place, and time; affect appropriate  NEUROLOGY: speech back to base line, CN 2-12 are intact and symmetric; no gross sensory deficits; was observed moving all 4 ext against gravity cooperating with exam.   SKIN: No rashes; no palpable lesions

## 2022-09-28 NOTE — DISCHARGE NOTE PROVIDER - HOSPITAL COURSE
36y/oF PMH metastatic breast cancer with mets to brain, lung, liver and bone on active chemo s/p completion of cranial radiation, hx malignant pleural effusion (s/p chest tube 12/23/21), pericardial effusion w/tamponade s/p pericardiocentesis (12/28/21), pathologic compression fx, anxiety, recently discharged from [facility] (9/16/22) after admission for hypoxic respiratory failure, returning to ER with reported confusion, slurred speech x3 days admitted for further w/u. Pt was admitted and  underwent MRI head, as well as MRI C/T/L spine, which appears stable from before, as well as EEG was negative for finding suggestive epilepsy. Therefore dysarthria with changes in mental status was attributed to most likely polypharmacy, stopped olanzapine.  Pt currently back to her base line mentation and stable to be d/c home with close f/u with her primary oncologist.

## 2022-09-28 NOTE — PROGRESS NOTE ADULT - SUBJECTIVE AND OBJECTIVE BOX
[facility] Division of Hospital Medicine    Chief Complaint:  slurred speech     SUBJECTIVE: reports feeling better, speech is almost normal, endorses chronic low back pain    OVERNIGHT EVENTS: none     Patient denies chest pain, SOB, abd pain, N/V, fever, chills, dysuria or any other complaints. All remainder ROS negative.     MEDICATIONS  (STANDING):  colchicine 0.6 milliGRAM(s) Oral every 12 hours  enoxaparin Injectable 40 milliGRAM(s) SubCutaneous every 24 hours  FLUoxetine 40 milliGRAM(s) Oral daily  ipratropium 17 MICROgram(s) HFA Inhaler 1 Puff(s) Inhalation every 6 hours  memantine 10 milliGRAM(s) Oral two times a day  methadone    Tablet 15 milliGRAM(s) Oral daily  methadone    Tablet 20 milliGRAM(s) Oral <User Schedule>  methadone    Tablet 15 milliGRAM(s) Oral <User Schedule>  methylphenidate 10 milliGRAM(s) Oral two times a day  metoprolol tartrate 25 milliGRAM(s) Oral two times a day  modafinil 100 milliGRAM(s) Oral daily  pantoprazole    Tablet 40 milliGRAM(s) Oral before breakfast  polyethylene glycol 3350 17 Gram(s) Oral daily  pregabalin 200 milliGRAM(s) Oral three times a day  senna 2 Tablet(s) Oral at bedtime    MEDICATIONS  (PRN):  acetaminophen     Tablet .. 650 milliGRAM(s) Oral every 6 hours PRN Temp greater or equal to 38C (100.4F), Mild Pain (1 - 3)  ALBUTerol    90 MICROgram(s) HFA Inhaler 2 Puff(s) Inhalation every 6 hours PRN Shortness of Breath and/or Wheezing  aluminum hydroxide/magnesium hydroxide/simethicone Suspension 30 milliLiter(s) Oral every 4 hours PRN Dyspepsia  diazepam    Tablet 5 milliGRAM(s) Oral every 8 hours PRN anxiety  HYDROmorphone   Tablet 4 milliGRAM(s) Oral every 4 hours PRN Severe Pain (7 - 10)  ondansetron Injectable 8 milliGRAM(s) IV Push every 6 hours PRN Nausea and/or Vomiting  prochlorperazine   Tablet 5 milliGRAM(s) Oral every 6 hours PRN nausea / vomiting        I&O's Summary      PHYSICAL EXAM:  Vital Signs Last 24 Hrs  T(C): 36.7 (27 Sep 2022 16:20), Max: 37 (26 Sep 2022 21:30)  T(F): 98.1 (27 Sep 2022 16:20), Max: 98.6 (26 Sep 2022 21:30)  HR: 67 (27 Sep 2022 17:15) (67 - 82)  BP: 106/73 (27 Sep 2022 17:15) (95/66 - 132/81)  BP(mean): 86 (27 Sep 2022 16:20) (83 - 86)  RR: 20 (27 Sep 2022 16:20) (17 - 20)  SpO2: 95% (27 Sep 2022 16:20) (94% - 99%)    Parameters below as of 27 Sep 2022 16:20  Patient On (Oxygen Delivery Method): room air            CONSTITUTIONAL: NAD, chronically ill appearing   ENMT: Moist oral mucosa, no pharyngeal injection or exudates; normal dentition; No JVD  RESPIRATORY: Normal respiratory effort; lungs are clear to auscultation bilaterally  CARDIOVASCULAR: Regular rate and rhythm, normal S1 and S2, no murmur/rub/gallop; No lower extremity edema; Peripheral pulses are 2+ bilaterally  ABDOMEN: Nontender to palpation, normoactive bowel sounds, no rebound/guarding; No hepatosplenomegaly  MUSCLOSKELETAL:  no clubbing or cyanosis of digits; no joint swelling or tenderness to palpation  PSYCH: A+O to person, place, and time; affect appropriate  NEUROLOGY: speech somewhat slurred, CN 2-12 are intact and symmetric; no gross sensory deficits; was observed moving all 4 ext against gravity cooperating with exam.   SKIN: No rashes; no palpable lesions    LABS:                        11.9   4.50  )-----------( 282      ( 27 Sep 2022 05:52 )             37.6     09-    141  |  107  |  6.3<L>  ----------------------------<  80  3.9   |  24.0  |  0.55    Ca    8.5      27 Sep 2022 05:52  Phos  3.6       Mg     2.0         TPro  6.1<L>  /  Alb  3.4  /  TBili  0.3<L>  /  DBili  x   /  AST  27  /  ALT  31  /  AlkPhos  81  09-27    PT/INR - ( 26 Sep 2022 14:10 )   PT: 13.6 sec;   INR: 1.17 ratio         PTT - ( 26 Sep 2022 14:10 )  PTT:34.1 sec  CARDIAC MARKERS ( 27 Sep 2022 05:52 )  x     / <0.01 ng/mL / x     / x     / x          Urinalysis Basic - ( 26 Sep 2022 15:52 )    Color: Yellow / Appearance: Clear / S.010 / pH: x  Gluc: x / Ketone: Negative  / Bili: Negative / Urobili: Negative mg/dL   Blood: x / Protein: Negative / Nitrite: Negative   Leuk Esterase: Moderate / RBC: 0-2 /HPF / WBC 3-5 /HPF   Sq Epi: x / Non Sq Epi: Occasional / Bacteria: Occasional        CAPILLARY BLOOD GLUCOSE            RADIOLOGY & ADDITIONAL TESTS:  Results Reviewed:   Imaging Personally Reviewed:  Electrocardiogram Personally Reviewed:
                             [facility]                                     Neu[facility]                                 Raul Florentino, & Naveed                                  [address]                                        [place], [place], [address]                                             [phone]    CC: AMS and dysrtrhria  HPI:  The patient is a 36y Female who presented with a several day history of confusion and slurred speech.  this is per the chart and per family, the patient does not think she was confussed or slurring. Currently she has resolved.  She has h/o stage 4 breast cancer with brain mets, s/p WBXRT last month.  Neurology is asked to assess.    Interval history: no new issues    Review of systems (neurology): Denies headache or dizziness. Denies weakness/numbness.  Denies speech/language deficits. Denies diplopia/blurred vision.  Denies confusion    MEDICATIONS  (STANDING):  colchicine 0.6 milliGRAM(s) Oral every 12 hours  enoxaparin Injectable 40 milliGRAM(s) SubCutaneous every 24 hours  FLUoxetine 40 milliGRAM(s) Oral daily  ipratropium 17 MICROgram(s) HFA Inhaler 1 Puff(s) Inhalation every 6 hours  memantine 10 milliGRAM(s) Oral two times a day  methadone    Tablet 15 milliGRAM(s) Oral <User Schedule>  methadone    Tablet 15 milliGRAM(s) Oral daily  methadone    Tablet 15 milliGRAM(s) Oral <User Schedule>  methylphenidate 10 milliGRAM(s) Oral two times a day  metoprolol tartrate 25 milliGRAM(s) Oral two times a day  modafinil 100 milliGRAM(s) Oral daily  pantoprazole    Tablet 40 milliGRAM(s) Oral before breakfast  polyethylene glycol 3350 17 Gram(s) Oral daily  pregabalin 200 milliGRAM(s) Oral three times a day  senna 2 Tablet(s) Oral at bedtime    MEDICATIONS  (PRN):  acetaminophen     Tablet .. 650 milliGRAM(s) Oral every 6 hours PRN Temp greater or equal to 38C (100.4F), Mild Pain (1 - 3)  ALBUTerol    90 MICROgram(s) HFA Inhaler 2 Puff(s) Inhalation every 6 hours PRN Shortness of Breath and/or Wheezing  aluminum hydroxide/magnesium hydroxide/simethicone Suspension 30 milliLiter(s) Oral every 4 hours PRN Dyspepsia  diazepam    Tablet 5 milliGRAM(s) Oral every 8 hours PRN anxiety  HYDROmorphone   Tablet 4 milliGRAM(s) Oral every 4 hours PRN Severe Pain (7 - 10)  ondansetron Injectable 8 milliGRAM(s) IV Push every 6 hours PRN Nausea and/or Vomiting  prochlorperazine   Tablet 5 milliGRAM(s) Oral every 6 hours PRN nausea / vomiting      Vital Signs Last 24 Hrs  T(C): 36.2 (28 Sep 2022 10:39), Max: 37.1 (28 Sep 2022 05:17)  T(F): 97.1 (28 Sep 2022 10:39), Max: 98.7 (28 Sep 2022 05:17)  HR: 86 (28 Sep 2022 10:39) (67 - 86)  BP: 105/73 (28 Sep 2022 10:39) (100/65 - 109/75)  BP(mean): 72 (28 Sep 2022 05:17) (72 - 86)  RR: 17 (28 Sep 2022 10:39) (17 - 20)  SpO2: 93% (28 Sep 2022 10:39) (92% - 95%)    Parameters below as of 28 Sep 2022 11:00  Patient On (Oxygen Delivery Method): room air    Detailed Neurologic Exam:    Mental status: The patient is awake and alert and has normal attention span.  The patient is fully oriented in 3 spheres. The patient is oriented to current events. The patient is able to name objects, follow commands, repeat sentences. No dysarthria    Cranial nerves: Pupils equal and react symmetrically to light. There is no visual field deficit to confrontation. Extraocular motion is full with no nystagmus. There is no ptosis. Facial sensation is intact. Facial musculature is symmetric. Palate elevates symmetrically. Tongue is midline.    Motor: There is normal bulk and tone.  There is no tremor.  Strength is 5/5 in the right arm and leg.   Strength is 5/5 in the left arm and leg.    Sensation: Intact to light touch and pin in 4 extremities    Reflexes: 1+ throughout and plantar responses are flexor.    Cerebellar: There is no dysmetria on finger to nose testing.    Gait : deferred    LABS:                                    12.5   4.23  )-----------( 249      ( 28 Sep 2022 06:05 )             40.6     09-28    139  |  106  |  6.8<L>  ----------------------------<  71  3.7   |  20.0<L>  |  0.61    Ca    8.4      28 Sep 2022 06:05  Phos  3.6     09-27  Mg     2.0     09-27    TPro  6.1<L>  /  Alb  3.4  /  TBili  0.3<L>  /  DBili  x   /  AST  27  /  ALT  31  /  AlkPhos  81  09-27    LIVER FUNCTIONS - ( 27 Sep 2022 05:52 )  Alb: 3.4 g/dL / Pro: 6.1 g/dL / ALK PHOS: 81 U/L / ALT: 31 U/L / AST: 27 U/L / GGT: x           _____________________________________________________________  EEG SUMMARY/CLASSIFICATION    Abnormal EEG in the awake state.  - Mild to moderate generalized slowing.    _____________________________________________________________  EEG IMPRESSION/CLINICAL CORRELATE    Abnormal EEG study.  1. Mild to moderate nonspecific diffuse or multifocal cerebral dysfunction.   2. No epileptiform pattern or seizure seen.    _____________________________________________________________    RADIOLOGY & ADDITIONAL STUDIES (independently reviewed unless otherwise noted):  MRI brain with contrast showed multiple small mets, no acute stroke or blood, no leptomeningeal disease    MRI spine showed multiple vertebral mets, epidural extension in thoracic spine without cord compression.  No signs of leptomeningeal disease  Head CT did not show acute CVA, mass or blood
No acute ON events noted.  Patient had MRI brain with contrast and MRI cervical /thoracic / lumbar spine with contrast on 09/28. Findings as noted below.        MRI brain on 09/28:  1.  Scattered small brain metastases, some faintly enhancing and some  seen as vasogenic edema without definite enhancement, suggestive of  treated lesions.  Direct comparison to previous gadolinium-enhanced MR  brain would benefit  this evaluation  2.  Left thalamic lesion is indeterminate, possibly treated lesion versus   remote infarction    MRI cervical, thoracic, lumbar spine with contrast on 09/28:  1. CERVICAL SPINE: Numerous vertebral metastases.  C3, C4 and C6  pathologic fractures with partial collapse most conspicuously C3 without  resultant cord compromise.  2. THORACIC SPINE: Numerous vertebral metastases.  T2 pathologic fracture  is essentially with left ventral canal compromise and left ventral cord  deformity without cord signal intensity change.  T9 mild left ventral  epidural disease.  T12 superior endplate fracture without canal   compromise although is associated with mild exaggeration of the   thoracolumbar kyphosis  3. LUMBAR SPINE: Numerous vertebral metastases.  S1 superior endplate  pathologic fracture without canal compromise.  L4 left inferior endplate  lesion is associated with mild left ventral epidural disease  4.  These findings have not substantially changed since 7/31/2022.    Physical Exam: CONSTITUTIONAL: NAD  EYES: +EOMI  CARDIAC: Regular rate, regular rhythm.  normal +S1, S2  RESPIRATORY: Clear to auscultation bilaterally, respirations unlabored on RA  GASTROINTESTINAL: Abdomen soft, non-tender, no guarding.  NEUROLOGICAL: Alert and oriented, +dysarthria, moving all extremities, strength 5/5  SKIN: warm, dry  PSYCHIATRIC: calm, cooperative    CBC:            12.5   4.23  )-----------( 249      ( 09-28-22 @ 06:05 )             40.6       Chem:         ( 09-28-22 @ 06:05 )    139  |  106  |  6.8<L>  ----------------------------<  71  3.7   |  20.0<L>  |  0.61    Liver Functions: ( 09-27-22 @ 05:52 )  Alb: 3.4 g/dL / Pro: 6.1 g/dL / ALK PHOS: 81 U/L / ALT: 31 U/L / AST: 27 U/L / GGT: x       Type & Screen:       Physical Exam: CONSTITUTIONAL: NAD  EYES: +EOMI  CARDIAC: Regular rate, regular rhythm.  normal +S1, S2  RESPIRATORY: Clear to auscultation bilaterally, respirations unlabored on RA  GASTROINTESTINAL: Abdomen soft, non-tender, no guarding.  NEUROLOGICAL: Alert and oriented, +dysarthria, moving all extremities, strength 5/5  SKIN: warm, dry  PSYCHIATRIC: calm, cooperative

## 2022-09-28 NOTE — DISCHARGE NOTE NURSING/CASE MANAGEMENT/SOCIAL WORK - PATIENT PORTAL LINK FT
You can access the FollowMyHealth Patient Portal offered by [facility] by registering at the following website: [email or web address]. By joining [employer]’s FollowMyHealth portal, you will also be able to view your health information using other applications (apps) compatible with our system.

## 2022-09-28 NOTE — DISCHARGE NOTE PROVIDER - NSDCMRMEDTOKEN_GEN_ALL_CORE_FT
Albuterol (Eqv-Proventil HFA) 90 mcg/inh inhalation aerosol: 2 puff(s) inhaled every 6 hours, As Needed shortness of breath  colchicine 0.6 mg oral tablet: 1 tab(s) orally every 12 hours  Compazine 5 mg oral tablet: 1 tab(s) orally every 6 hours, As Needed  diazePAM 5 mg oral tablet: 1 tab(s) orally every 8 hours, As Needed  Dilaudid 4 mg oral tablet: 1 tab(s) orally every 4 hours, As Needed  ipratropium 18 mcg/inh inhalation aerosol: 1 puff(s) inhaled once a day  methadone 10 mg oral tablet: orally 3 times a day, 1.5 tablets at 6AM, 1.5 tablets at 2PM, 1.5 tablets at 10PM  methylphenidate 10 mg oral tablet: 1 tab(s) orally 2 times a day  metoprolol tartrate 25 mg oral tablet: 1 tab(s) orally 2 times a day, As Needed  modafinil 100 mg oral tablet: 1 tab(s) orally once a day (in the morning)  Namenda 10 mg oral tablet: 1 tab(s) orally 2 times a day  pantoprazole 40 mg oral delayed release tablet: 1 tab(s) orally once a day (before a meal)  polyethylene glycol 3350 oral powder for reconstitution: 17 gram(s) orally once a day  senna leaf extract oral tablet: 2 tab(s) orally once a day (at bedtime)

## 2022-09-28 NOTE — PROGRESS NOTE ADULT - ASSESSMENT
36y/oF PMH metastatic breast cancer with mets to brain, lung, liver and bone on active chemo s/p completion of cranial radiation, hx malignant pleural effusion (s/p chest tube 12/23/21), pericardial effusion w/tamponade s/p pericardiocentesis (12/28/21), pathologic compression fx, anxiety, recently discharged from [facility] (9/16/22) after admission for hypoxic respiratory failure, returning to ER with reported confusion, slurred speech x3 days admitted for further w/u.    Hx Metastatic breast cancer   -cont pain control, anti-emetics   -f/u heme/onc at [facility] with Dr Chapa  -Last Enhertu on 8/29/22  -Completed WBRT / 7/29/22  -MRI brain 9/28 noted  -MRI spine findings noted from 9/18/22 essesntially unchanged as per the read since July 2022 but will get neuro input      AMS  Dysarthria   r/o CVA vs progression of known brain metastases vs ?polypharmacy  -CTH negative for acute hemorrhage, mass or mass effect   -recent entero/rhinovirus   -s/p radiation, steroids   -Mri brain appreciayed, follow up neuro recs  -neuro checks   -neuro consulted     Hypophosphatemia, mild   -replete   -f/u repeat     Hx pericardial effusion   -cont colchicine 0.6mg bid   -f/u ekg    Will follow
36y/oF PMH metastatic breast cancer with mets to brain, lung, liver and bone on active chemo s/p completion of cranial radiation, hx malignant pleural effusion (s/p chest tube 12/23/21), pericardial effusion w/tamponade s/p pericardiocentesis (12/28/21), pathologic compression fx, anxiety, recently discharged from [facility] (9/16/22) after admission for hypoxic respiratory failure, returning to ER with reported confusion, slurred speech x3 days admitted for further w/u.      Dysarthria with changes in mental status, could be due to CVA/TIA vs post-ictal vs leptomeningial spred of MTS  Polypharmacy may be contributing as well   - Neurolgy and Oncology consult noted  - MRI brain, C/T/L spine is ordered  - stopped olanzapin  - pain management as bellow       Hx Metastatic breast cancer   -cont pain control with home regiment for now metadol 15 tid, Dilaudid 4 pnr q4h, lyrica  - c/w  anti-emetics   - heme/onc ([place] [facility]) consult noted    Hypophosphatemia, mild   -replete   -f/u repeat     Hx pericardial effusion   -cont colchicine 0.6mg bid   -f/u ekg    Anxiety   -c/w fluoxetine    vte ppx: lovenox sq   code/social - full code  dispo - pending MRI  
The patient is a 36y Female who is followed by neurology because of transient AMS and slurred speech.    Possibly due to medications  possibly seizure from mets  less likely leptomeningeal spread as she has resolved- MRI did not show leptomenigeal spread    Agree with MRI brain wwo contrast- as above small mets, ? positive treatment effect of whole brain XRT  check EEG- as above no seizure    Neurologically cleared for d/c if medically/oncology stable.    discussed with Dr Cruz    Thank you for allowing me to participate in the care of your patient    Shashi Carter MD, PhD   [phone]

## 2022-10-26 ENCOUNTER — INPATIENT (INPATIENT)
Facility: HOSPITAL | Age: 37
LOS: 1 days | Discharge: HOME | DRG: 92 | End: 2022-10-28
Attending: HOSPITALIST | Admitting: INTERNAL MEDICINE
Payer: MEDICAID

## 2022-10-26 VITALS
HEART RATE: 73 BPM | WEIGHT: 139.99 LBS | OXYGEN SATURATION: 99 % | TEMPERATURE: 98 F | HEIGHT: 61 IN | DIASTOLIC BLOOD PRESSURE: 75 MMHG | RESPIRATION RATE: 16 BRPM | SYSTOLIC BLOOD PRESSURE: 133 MMHG

## 2022-10-26 DIAGNOSIS — R41.82 ALTERED MENTAL STATUS, UNSPECIFIED: ICD-10-CM

## 2022-10-26 DIAGNOSIS — Z90.89 ACQUIRED ABSENCE OF OTHER ORGANS: Chronic | ICD-10-CM

## 2022-10-26 DIAGNOSIS — Z98.890 OTHER SPECIFIED POSTPROCEDURAL STATES: Chronic | ICD-10-CM

## 2022-10-26 LAB
ALBUMIN SERPL ELPH-MCNC: 3.7 G/DL — SIGNIFICANT CHANGE UP (ref 3.3–5.2)
ALP SERPL-CCNC: 80 U/L — SIGNIFICANT CHANGE UP (ref 40–120)
ALT FLD-CCNC: 22 U/L — SIGNIFICANT CHANGE UP
ANION GAP SERPL CALC-SCNC: 10 MMOL/L — SIGNIFICANT CHANGE UP (ref 5–17)
APTT BLD: 32.8 SEC — SIGNIFICANT CHANGE UP (ref 27.5–35.5)
AST SERPL-CCNC: 30 U/L — SIGNIFICANT CHANGE UP
BASOPHILS # BLD AUTO: 0.01 K/UL — SIGNIFICANT CHANGE UP (ref 0–0.2)
BASOPHILS NFR BLD AUTO: 0.2 % — SIGNIFICANT CHANGE UP (ref 0–2)
BILIRUB SERPL-MCNC: 0.8 MG/DL — SIGNIFICANT CHANGE UP (ref 0.4–2)
BUN SERPL-MCNC: 14.9 MG/DL — SIGNIFICANT CHANGE UP (ref 8–20)
CALCIUM SERPL-MCNC: 9.2 MG/DL — SIGNIFICANT CHANGE UP (ref 8.4–10.5)
CHLORIDE SERPL-SCNC: 102 MMOL/L — SIGNIFICANT CHANGE UP (ref 96–108)
CO2 SERPL-SCNC: 29 MMOL/L — SIGNIFICANT CHANGE UP (ref 22–29)
CREAT SERPL-MCNC: 0.63 MG/DL — SIGNIFICANT CHANGE UP (ref 0.5–1.3)
EGFR: 118 ML/MIN/1.73M2 — SIGNIFICANT CHANGE UP
EOSINOPHIL # BLD AUTO: 0.02 K/UL — SIGNIFICANT CHANGE UP (ref 0–0.5)
EOSINOPHIL NFR BLD AUTO: 0.4 % — SIGNIFICANT CHANGE UP (ref 0–6)
GAS PNL BLDV: SIGNIFICANT CHANGE UP
GLUCOSE SERPL-MCNC: 72 MG/DL — SIGNIFICANT CHANGE UP (ref 70–99)
HCT VFR BLD CALC: 35.6 % — SIGNIFICANT CHANGE UP (ref 34.5–45)
HGB BLD-MCNC: 11.4 G/DL — LOW (ref 11.5–15.5)
IMM GRANULOCYTES NFR BLD AUTO: 0.2 % — SIGNIFICANT CHANGE UP (ref 0–0.9)
INR BLD: 1.05 RATIO — SIGNIFICANT CHANGE UP (ref 0.88–1.16)
LYMPHOCYTES # BLD AUTO: 0.54 K/UL — LOW (ref 1–3.3)
LYMPHOCYTES # BLD AUTO: 9.9 % — LOW (ref 13–44)
MCHC RBC-ENTMCNC: 29.2 PG — SIGNIFICANT CHANGE UP (ref 27–34)
MCHC RBC-ENTMCNC: 32 GM/DL — SIGNIFICANT CHANGE UP (ref 32–36)
MCV RBC AUTO: 91.3 FL — SIGNIFICANT CHANGE UP (ref 80–100)
MONOCYTES # BLD AUTO: 0.29 K/UL — SIGNIFICANT CHANGE UP (ref 0–0.9)
MONOCYTES NFR BLD AUTO: 5.3 % — SIGNIFICANT CHANGE UP (ref 2–14)
NEUTROPHILS # BLD AUTO: 4.58 K/UL — SIGNIFICANT CHANGE UP (ref 1.8–7.4)
NEUTROPHILS NFR BLD AUTO: 84 % — HIGH (ref 43–77)
PLATELET # BLD AUTO: 301 K/UL — SIGNIFICANT CHANGE UP (ref 150–400)
POTASSIUM SERPL-MCNC: 3.6 MMOL/L — SIGNIFICANT CHANGE UP (ref 3.5–5.3)
POTASSIUM SERPL-SCNC: 3.6 MMOL/L — SIGNIFICANT CHANGE UP (ref 3.5–5.3)
PROT SERPL-MCNC: 6.4 G/DL — LOW (ref 6.6–8.7)
PROTHROM AB SERPL-ACNC: 12.2 SEC — SIGNIFICANT CHANGE UP (ref 10.5–13.4)
RAPID RVP RESULT: SIGNIFICANT CHANGE UP
RBC # BLD: 3.9 M/UL — SIGNIFICANT CHANGE UP (ref 3.8–5.2)
RBC # FLD: 15.1 % — HIGH (ref 10.3–14.5)
SARS-COV-2 RNA SPEC QL NAA+PROBE: SIGNIFICANT CHANGE UP
SODIUM SERPL-SCNC: 141 MMOL/L — SIGNIFICANT CHANGE UP (ref 135–145)
WBC # BLD: 5.45 K/UL — SIGNIFICANT CHANGE UP (ref 3.8–10.5)
WBC # FLD AUTO: 5.45 K/UL — SIGNIFICANT CHANGE UP (ref 3.8–10.5)

## 2022-10-26 PROCEDURE — G1004: CPT

## 2022-10-26 PROCEDURE — 99285 EMERGENCY DEPT VISIT HI MDM: CPT

## 2022-10-26 PROCEDURE — 70450 CT HEAD/BRAIN W/O DYE: CPT | Mod: 26,ME

## 2022-10-26 PROCEDURE — 93010 ELECTROCARDIOGRAM REPORT: CPT

## 2022-10-26 PROCEDURE — 71045 X-RAY EXAM CHEST 1 VIEW: CPT | Mod: 26

## 2022-10-26 RX ORDER — HYDROMORPHONE HYDROCHLORIDE 2 MG/ML
1 INJECTION INTRAMUSCULAR; INTRAVENOUS; SUBCUTANEOUS ONCE
Refills: 0 | Status: DISCONTINUED | OUTPATIENT
Start: 2022-10-26 | End: 2022-10-26

## 2022-10-26 RX ORDER — HYDROMORPHONE HYDROCHLORIDE 2 MG/ML
4 INJECTION INTRAMUSCULAR; INTRAVENOUS; SUBCUTANEOUS EVERY 4 HOURS
Refills: 0 | Status: DISCONTINUED | OUTPATIENT
Start: 2022-10-26 | End: 2022-10-28

## 2022-10-26 RX ORDER — PANTOPRAZOLE SODIUM 20 MG/1
40 TABLET, DELAYED RELEASE ORAL
Refills: 0 | Status: DISCONTINUED | OUTPATIENT
Start: 2022-10-26 | End: 2022-10-28

## 2022-10-26 RX ORDER — FLUOXETINE HCL 10 MG
40 CAPSULE ORAL DAILY
Refills: 0 | Status: DISCONTINUED | OUTPATIENT
Start: 2022-10-26 | End: 2022-10-28

## 2022-10-26 RX ORDER — METHYLPHENIDATE HCL 5 MG
10 TABLET ORAL
Refills: 0 | Status: DISCONTINUED | OUTPATIENT
Start: 2022-10-26 | End: 2022-10-28

## 2022-10-26 RX ORDER — DIAZEPAM 5 MG
1 TABLET ORAL
Qty: 0 | Refills: 0 | DISCHARGE

## 2022-10-26 RX ORDER — IPRATROPIUM BROMIDE 0.2 MG/ML
1 SOLUTION, NON-ORAL INHALATION
Qty: 0 | Refills: 0 | DISCHARGE

## 2022-10-26 RX ORDER — MEMANTINE HYDROCHLORIDE 10 MG/1
10 TABLET ORAL
Refills: 0 | Status: DISCONTINUED | OUTPATIENT
Start: 2022-10-26 | End: 2022-10-28

## 2022-10-26 RX ORDER — MODAFINIL 200 MG/1
1 TABLET ORAL
Qty: 0 | Refills: 0 | DISCHARGE

## 2022-10-26 RX ORDER — ALBUTEROL 90 UG/1
2 AEROSOL, METERED ORAL
Qty: 0 | Refills: 0 | DISCHARGE

## 2022-10-26 RX ORDER — METHADONE HYDROCHLORIDE 40 MG/1
1.5 TABLET ORAL
Qty: 0 | Refills: 0 | DISCHARGE

## 2022-10-26 RX ORDER — METHADONE HYDROCHLORIDE 40 MG/1
7.5 TABLET ORAL EVERY 8 HOURS
Refills: 0 | Status: DISCONTINUED | OUTPATIENT
Start: 2022-10-26 | End: 2022-10-28

## 2022-10-26 RX ORDER — METOPROLOL TARTRATE 50 MG
1 TABLET ORAL
Qty: 0 | Refills: 0 | DISCHARGE

## 2022-10-26 RX ORDER — COLCHICINE 0.6 MG
0.6 TABLET ORAL EVERY 12 HOURS
Refills: 0 | Status: DISCONTINUED | OUTPATIENT
Start: 2022-10-26 | End: 2022-10-28

## 2022-10-26 RX ORDER — OLANZAPINE 15 MG/1
2.5 TABLET, FILM COATED ORAL AT BEDTIME
Refills: 0 | Status: DISCONTINUED | OUTPATIENT
Start: 2022-10-26 | End: 2022-10-26

## 2022-10-26 RX ORDER — SENNA PLUS 8.6 MG/1
2 TABLET ORAL AT BEDTIME
Refills: 0 | Status: DISCONTINUED | OUTPATIENT
Start: 2022-10-26 | End: 2022-10-28

## 2022-10-26 RX ORDER — ACETAMINOPHEN 500 MG
650 TABLET ORAL EVERY 6 HOURS
Refills: 0 | Status: DISCONTINUED | OUTPATIENT
Start: 2022-10-26 | End: 2022-10-28

## 2022-10-26 RX ORDER — DIAZEPAM 5 MG
2 TABLET ORAL EVERY 8 HOURS
Refills: 0 | Status: DISCONTINUED | OUTPATIENT
Start: 2022-10-26 | End: 2022-10-28

## 2022-10-26 RX ORDER — SODIUM CHLORIDE 9 MG/ML
250 INJECTION INTRAMUSCULAR; INTRAVENOUS; SUBCUTANEOUS ONCE
Refills: 0 | Status: COMPLETED | OUTPATIENT
Start: 2022-10-26 | End: 2022-10-27

## 2022-10-26 RX ADMIN — Medication 150 MILLIGRAM(S): at 17:12

## 2022-10-26 RX ADMIN — Medication 2 MILLIGRAM(S): at 17:12

## 2022-10-26 RX ADMIN — SENNA PLUS 2 TABLET(S): 8.6 TABLET ORAL at 22:02

## 2022-10-26 RX ADMIN — HYDROMORPHONE HYDROCHLORIDE 4 MILLIGRAM(S): 2 INJECTION INTRAMUSCULAR; INTRAVENOUS; SUBCUTANEOUS at 22:25

## 2022-10-26 RX ADMIN — Medication 10 MILLIGRAM(S): at 22:02

## 2022-10-26 RX ADMIN — Medication 0.6 MILLIGRAM(S): at 17:11

## 2022-10-26 RX ADMIN — METHADONE HYDROCHLORIDE 7.5 MILLIGRAM(S): 40 TABLET ORAL at 22:02

## 2022-10-26 RX ADMIN — HYDROMORPHONE HYDROCHLORIDE 1 MILLIGRAM(S): 2 INJECTION INTRAMUSCULAR; INTRAVENOUS; SUBCUTANEOUS at 13:40

## 2022-10-26 RX ADMIN — Medication 650 MILLIGRAM(S): at 17:11

## 2022-10-26 RX ADMIN — HYDROMORPHONE HYDROCHLORIDE 4 MILLIGRAM(S): 2 INJECTION INTRAMUSCULAR; INTRAVENOUS; SUBCUTANEOUS at 21:29

## 2022-10-26 RX ADMIN — MEMANTINE HYDROCHLORIDE 10 MILLIGRAM(S): 10 TABLET ORAL at 17:12

## 2022-10-26 RX ADMIN — HYDROMORPHONE HYDROCHLORIDE 4 MILLIGRAM(S): 2 INJECTION INTRAMUSCULAR; INTRAVENOUS; SUBCUTANEOUS at 17:11

## 2022-10-26 RX ADMIN — HYDROMORPHONE HYDROCHLORIDE 1 MILLIGRAM(S): 2 INJECTION INTRAMUSCULAR; INTRAVENOUS; SUBCUTANEOUS at 15:41

## 2022-10-26 RX ADMIN — HYDROMORPHONE HYDROCHLORIDE 1 MILLIGRAM(S): 2 INJECTION INTRAMUSCULAR; INTRAVENOUS; SUBCUTANEOUS at 16:06

## 2022-10-26 NOTE — H&P ADULT - HISTORY OF PRESENT ILLNESS
36 yo F w/ hx metastatic breast cancer to brain, lung, liver and bone, on active chemo s/p brain XRT, hx malignant pleural effusion, pericardial effusion w/ tamponade, anxiety/depression, recent dc from [facility] 9/28 for AMS/slurred speech likely from polypharmacy and olanzapine was discontinue presents from home for episode of altered mental status, lethargy and confusion.  Usual state of health night prior but in am she was noted to be lethargic per family. unsure if she took extra medications as pill bottles are next to her bed but also has all her medications set up on weekly pill box.  Family gave ativan at home without improvement and brought to ER via EMS.  family notes that patient follows with pain mgmt/palliative care at cancer center and has her medications adjusted frequently.  She received chemo infusion this past monday 10/24.   in ER, CT head negative and request admission for further management. per /friend at bedside patient mental status markedly improved.

## 2022-10-26 NOTE — ED ADULT NURSE REASSESSMENT NOTE - NS ED NURSE REASSESS COMMENT FT1
pt more awake at this time, answering questions appropriately, no resp distress, reporting body pain and back pain. awaiting rest of lab work for final dispo.

## 2022-10-26 NOTE — ED ADULT NURSE NOTE - OBJECTIVE STATEMENT
pt brought to ED via EMS for reports of AMS as per family, reporting she takes large amounts of pain medications and thinks things may have been mixed up. pt given Narcan by EMS with no relief of symptoms. pt airway patent, no resp distress, follows some commands. pt with known breast cancer with mets to the brain. afebrile, no recent sick contacts as per family. pt incontinent of urine and stool on arrival.

## 2022-10-26 NOTE — ED PROVIDER NOTE - CLINICAL SUMMARY MEDICAL DECISION MAKING FREE TEXT BOX
labs and imaging reviewed. Pt's mental status improved but still very weak and unexplained incident this morning. [place] [facility] paged for consult. case d/w dr. baldwin for admission.

## 2022-10-26 NOTE — CHART NOTE - NSCHARTNOTEFT_GEN_A_CORE
Pts B/P 85/52  Pt received Dilaudid 4 mg po at 21:30  Pt without complaints  Pt mentating well  B/P 90/50(Manually by myself) P 80 R 20 PO 95% RA T 97.9  Pt declines CBC  Normal saline 250cc bolus  Continue to monitor

## 2022-10-26 NOTE — ED ADULT NURSE NOTE - NS ED NURSE RECORD ANOTHER VITAL SIGN
Patient admitted under [facility] with [address] on this  Date  Yes

## 2022-10-26 NOTE — ED ADULT TRIAGE NOTE - CHIEF COMPLAINT QUOTE
Pt BIBA from home for AMS and increased weakness. Pt with breast ca with mets to the brain and other organs. Pt is c/o a lot of pain all over. Pt was "unresposive" in her bed as per her family. Was given Narcan 4mg, pt is awake at this time.

## 2022-10-26 NOTE — ED PROVIDER NOTE - PHYSICAL EXAMINATION
Constitutional - well-developed.   Head - NCAT. Airway patent.   Eyes - PERRL.   CV - RRR. no murmur. no edema.   Pulm - CTAB.   Abd - soft, nt. no rebound. no guarding.   Neuro -Drowsy but arousable to voice.  Alert to person and place but not situation or time.  Skin - No rash. .  MSK - normal ROM.

## 2022-10-26 NOTE — H&P ADULT - ASSESSMENT
38 yo F w/ hx metastatic breast cancer to brain, lung, liver and bone, on active chemo s/p brain XRT, hx malignant pleural effusion, pericardial effusion w/ tamponade, anxiety/depression, recent dc from [facility] 9/28 for AMS/slurred speech likely from polypharmacy and olanzapine was discontinue presents from home for episode of altered mental status, lethargy and confusion.  Usual state of health night prior but in am she was noted to be lethargic per family. unsure if she took extra medications as pill boxes are next to her bed but also has all her medications set up on weekly pill box.  Family gave ativan at home without improvement and brought to ER via EMS.  family notes that patient follows with pain mgmt/palliative care at cancer center and has her medications adjusted frequently.  She received chemo infusion this past monday 10/24.   in ER, CT head negative and request admission for further management. per /friend at bedside patient mental status markedly improved.     acute encephalopathy: likely due to polypharmacy    improving mental status.    olanzapine was still on med list even though recommended dc on prior admit    also on multiple medications and has access to her pill bottles; advised family to be more careful with medication administration and to avoid duplicate bottles/doses to be around patient    CT head negative     if encephalopathy recurs, then would perform repeat MRI/EEG.    for now will monitor.    chronic pain/anxiety/depression:     valium prn, dilaudid prn    lyrica, fluoxetine and methadone.    hold olanzapine     metastatic breast CA: outpatient onc follow up    if continues to improve, would dc in am.   d/w /friend at bedside, who are agreeable

## 2022-10-26 NOTE — ED PROVIDER NOTE - OBJECTIVE STATEMENT
Pt is a 38 yo F brought in by EMS for AMS.  Pt with metastatic breast cancer with mets to liver, bone, and brain. pt had whole brain radiation 2 mos ago.  Pt was fine yesterday and this morning was found to be very confused in bed.  Pt was hallucinating.  They gave her narcan as she is on heavy doses of opiates and mental status did not improve so EMS was called. no other complaints. not currently on steroids.

## 2022-10-27 ENCOUNTER — TRANSCRIPTION ENCOUNTER (OUTPATIENT)
Age: 37
End: 2022-10-27

## 2022-10-27 PROCEDURE — 99232 SBSQ HOSP IP/OBS MODERATE 35: CPT

## 2022-10-27 RX ORDER — SODIUM CHLORIDE 9 MG/ML
1000 INJECTION INTRAMUSCULAR; INTRAVENOUS; SUBCUTANEOUS
Refills: 0 | Status: DISCONTINUED | OUTPATIENT
Start: 2022-10-27 | End: 2022-10-28

## 2022-10-27 RX ORDER — MORPHINE SULFATE 50 MG/1
1 CAPSULE, EXTENDED RELEASE ORAL ONCE
Refills: 0 | Status: DISCONTINUED | OUTPATIENT
Start: 2022-10-27 | End: 2022-10-27

## 2022-10-27 RX ADMIN — HYDROMORPHONE HYDROCHLORIDE 4 MILLIGRAM(S): 2 INJECTION INTRAMUSCULAR; INTRAVENOUS; SUBCUTANEOUS at 17:39

## 2022-10-27 RX ADMIN — HYDROMORPHONE HYDROCHLORIDE 4 MILLIGRAM(S): 2 INJECTION INTRAMUSCULAR; INTRAVENOUS; SUBCUTANEOUS at 22:50

## 2022-10-27 RX ADMIN — HYDROMORPHONE HYDROCHLORIDE 4 MILLIGRAM(S): 2 INJECTION INTRAMUSCULAR; INTRAVENOUS; SUBCUTANEOUS at 12:45

## 2022-10-27 RX ADMIN — Medication 2 MILLIGRAM(S): at 10:28

## 2022-10-27 RX ADMIN — HYDROMORPHONE HYDROCHLORIDE 4 MILLIGRAM(S): 2 INJECTION INTRAMUSCULAR; INTRAVENOUS; SUBCUTANEOUS at 01:11

## 2022-10-27 RX ADMIN — Medication 10 MILLIGRAM(S): at 06:19

## 2022-10-27 RX ADMIN — HYDROMORPHONE HYDROCHLORIDE 4 MILLIGRAM(S): 2 INJECTION INTRAMUSCULAR; INTRAVENOUS; SUBCUTANEOUS at 13:45

## 2022-10-27 RX ADMIN — Medication 40 MILLIGRAM(S): at 12:45

## 2022-10-27 RX ADMIN — Medication 150 MILLIGRAM(S): at 06:19

## 2022-10-27 RX ADMIN — HYDROMORPHONE HYDROCHLORIDE 4 MILLIGRAM(S): 2 INJECTION INTRAMUSCULAR; INTRAVENOUS; SUBCUTANEOUS at 17:07

## 2022-10-27 RX ADMIN — MORPHINE SULFATE 1 MILLIGRAM(S): 50 CAPSULE, EXTENDED RELEASE ORAL at 05:21

## 2022-10-27 RX ADMIN — Medication 2 MILLIGRAM(S): at 02:28

## 2022-10-27 RX ADMIN — METHADONE HYDROCHLORIDE 7.5 MILLIGRAM(S): 40 TABLET ORAL at 06:18

## 2022-10-27 RX ADMIN — Medication 10 MILLIGRAM(S): at 17:07

## 2022-10-27 RX ADMIN — Medication 150 MILLIGRAM(S): at 17:07

## 2022-10-27 RX ADMIN — METHADONE HYDROCHLORIDE 7.5 MILLIGRAM(S): 40 TABLET ORAL at 21:50

## 2022-10-27 RX ADMIN — MEMANTINE HYDROCHLORIDE 10 MILLIGRAM(S): 10 TABLET ORAL at 17:07

## 2022-10-27 RX ADMIN — MORPHINE SULFATE 1 MILLIGRAM(S): 50 CAPSULE, EXTENDED RELEASE ORAL at 14:29

## 2022-10-27 RX ADMIN — Medication 0.6 MILLIGRAM(S): at 17:07

## 2022-10-27 RX ADMIN — HYDROMORPHONE HYDROCHLORIDE 4 MILLIGRAM(S): 2 INJECTION INTRAMUSCULAR; INTRAVENOUS; SUBCUTANEOUS at 08:44

## 2022-10-27 RX ADMIN — METHADONE HYDROCHLORIDE 7.5 MILLIGRAM(S): 40 TABLET ORAL at 13:01

## 2022-10-27 RX ADMIN — SODIUM CHLORIDE 500 MILLILITER(S): 9 INJECTION INTRAMUSCULAR; INTRAVENOUS; SUBCUTANEOUS at 00:10

## 2022-10-27 RX ADMIN — Medication 0.6 MILLIGRAM(S): at 06:18

## 2022-10-27 RX ADMIN — MEMANTINE HYDROCHLORIDE 10 MILLIGRAM(S): 10 TABLET ORAL at 06:17

## 2022-10-27 RX ADMIN — HYDROMORPHONE HYDROCHLORIDE 4 MILLIGRAM(S): 2 INJECTION INTRAMUSCULAR; INTRAVENOUS; SUBCUTANEOUS at 09:44

## 2022-10-27 RX ADMIN — HYDROMORPHONE HYDROCHLORIDE 4 MILLIGRAM(S): 2 INJECTION INTRAMUSCULAR; INTRAVENOUS; SUBCUTANEOUS at 02:16

## 2022-10-27 RX ADMIN — MORPHINE SULFATE 1 MILLIGRAM(S): 50 CAPSULE, EXTENDED RELEASE ORAL at 14:45

## 2022-10-27 RX ADMIN — HYDROMORPHONE HYDROCHLORIDE 4 MILLIGRAM(S): 2 INJECTION INTRAMUSCULAR; INTRAVENOUS; SUBCUTANEOUS at 21:50

## 2022-10-27 RX ADMIN — PANTOPRAZOLE SODIUM 40 MILLIGRAM(S): 20 TABLET, DELAYED RELEASE ORAL at 06:18

## 2022-10-27 RX ADMIN — MORPHINE SULFATE 1 MILLIGRAM(S): 50 CAPSULE, EXTENDED RELEASE ORAL at 04:25

## 2022-10-27 NOTE — DISCHARGE NOTE PROVIDER - HOSPITAL COURSE
36y/oF PMH metastatic st IV breast cancer with mets to brain, lung, liver and bone on active chemo s/p completion of cranial radiation, hx malignant pleural effusion (s/p chest tube 12/23/21), pericardial effusion w/tamponade s/p pericardiocentesis (12/28/21), pathologic compression fx, anxiety, recently discharged from [facility] (9/16/22) after admission for hypoxic respiratory failure, returning to ER with reported confusion, slurred speech x3 days admitted for further w/u. Neurolgy and Oncology consulted.  CT head negative.  Previous MRI brain 10/21 did not show leptomenigeal spread and showed stable findings. Mentation improved back to baseline. Confusion and change in mental status was probably polypharmacy contributed. Olanzipin was placed on hold. Pt. continued with Fluoxetine, and PRN pain meds for pain control with home regiment for now metadol 15 tid, Dilaudid 4 pnr q4h, lyrica, and anti-emetics. Hypophosphatemia supplemented.        Disposition: Pt. was cleared by Neurology. Stable to be Dced home with follow up with Oncology at [facility]- f/up with Dr Chaap upon DC   F/U with Pain management, and f/u with Palliative Dr. Benson. 36y/oF PMH metastatic st IV breast cancer with mets to brain, lung, liver and bone on active chemo s/p completion of cranial radiation, hx malignant pleural effusion (s/p chest tube 12/23/21), pericardial effusion w/tamponade s/p pericardiocentesis (12/28/21), pathologic compression fx, anxiety, recently discharged from [facility] (9/16/22) after admission for hypoxic respiratory failure, returning to ER with reported confusion, slurred speech x3 days admitted for further w/u. Neurolgy and Oncology consulted.  CT head negative.  Previous MRI brain 10/21 did not show leptomenigeal spread and showed stable findings. Mentation improved back to baseline. Confusion and change in mental status was probably polypharmacy contributed. Olanzipin was placed on hold. Pt. continued with Fluoxetine, and PRN pain meds for pain control with home regiment for now metadol 15 tid, Dilaudid 4 pnr q4h, lyrica, and anti-emetics. Hypophosphatemia supplemented.        Disposition: Stable to be Dced home with follow up with Oncology at [facility]- f/up with Dr Chapa upon DC   F/U with Pain management, and f/u with Palliative Dr. Benson. 36y/oF PMH metastatic st IV breast cancer with mets to brain, lung, liver and bone on active chemo s/p completion of cranial radiation, hx malignant pleural effusion (s/p chest tube 12/23/21), pericardial effusion w/tamponade s/p pericardiocentesis (12/28/21), pathologic compression fx, anxiety, recently discharged from [facility] (9/16/22) after admission for hypoxic respiratory failure, returning to ER with reported confusion, slurred speech x3 days admitted for further w/u. Neurolgy and Oncology consulted.  CT head negative.  Previous MRI brain 10/21 did not show leptomenigeal spread and showed stable findings. Mentation improved back to baseline. Confusion and change in mental status was probably polypharmacy contributed. Olanzipin was placed on hold. Pt. continued with Fluoxetine, and PRN pain meds for pain control with home regiment for now methadone 15 tid, Dilaudid 4 pnr q4h, lyrica, and anti-emetics. Hypophosphatemia supplemented.        Disposition: Stable to be Dced home with follow up with Oncology at [facility]- f/up with Dr Chapa upon DC   F/U with Pain management, and f/u with Palliative Dr. Benson.

## 2022-10-27 NOTE — DISCHARGE NOTE PROVIDER - CARE PROVIDER_API CALL
Morenita Sandoval)  HematologyOncology; Internal Medicine; Medical Oncology  [address], [address]  [place] [address]  Phone: [phone]  Fax: [phone]  Follow Up Time: 1 week

## 2022-10-27 NOTE — DISCHARGE NOTE PROVIDER - NSDCFUADDAPPT_GEN_ALL_CORE_FT
brain MRI on 10/21 as outpatient which showed stable findings  outpatient f/up with palliative MD Dr Benson   Please keep medications at check and only take what's prescribed.   F/U with pain management,   Follow up with Oncology. f/up with Dr Chapa upon DC

## 2022-10-27 NOTE — DISCHARGE NOTE PROVIDER - NSDCCPCAREPLAN_GEN_ALL_CORE_FT
PRINCIPAL DISCHARGE DIAGNOSIS  Diagnosis: Change in mental status  Assessment and Plan of Treatment: Mentation improved.   This was most probably due to polypharmacy. brain MRI on 10/21 as outpatient which showed stable findings  CTH in ER negative.  Olanzipine on hold  outpatient f/up with palliative MD Dr Benson   Please keep medications at check and only take whats prescribed. F/U with pain management, and Oncology.       PRINCIPAL DISCHARGE DIAGNOSIS  Diagnosis: Change in mental status  Assessment and Plan of Treatment: Mentation improved.   This was most probably due to polypharmacy. brain MRI on 10/21 as outpatient which showed stable findings  CTH in ER negative.  Olanzipine on hold  outpatient f/up with palliative MD Dr Benson   Please keep medications at check and only take whats prescribed. F/U with pain management, and Oncology.      SECONDARY DISCHARGE DIAGNOSES  Diagnosis: Breast cancer  Assessment and Plan of Treatment: follow up with oncologist

## 2022-10-27 NOTE — DISCHARGE NOTE PROVIDER - NSDCMRMEDTOKEN_GEN_ALL_CORE_FT
colchicine 0.6 mg oral tablet: 1 tab(s) orally every 12 hours  Compazine 5 mg oral tablet: 1 tab(s) orally every 6 hours, As Needed  diazePAM 2 mg oral tablet: 1 tab(s) orally every 8 hours, As Needed  Dilaudid 4 mg oral tablet: 1 tab(s) orally every 4 hours, As Needed  FLUoxetine 40 mg oral capsule: 1 cap(s) orally once a day  Lyrica 150 mg oral capsule: 1 cap(s) orally 2 times a day  methadone 5 mg oral tablet:  1.5 tablets at 6AM, 1.5 tablets at 2PM, 1.5 tablets at 10PM  methylphenidate 10 mg oral tablet: 1 tab(s) orally 2 times a day  Namenda 10 mg oral tablet: 1 tab(s) orally 2 times a day  OLANZapine 2.5 mg oral tablet: 1 tab(s) orally once a day (at bedtime)  pantoprazole 40 mg oral delayed release tablet: 1 tab(s) orally once a day (before a meal)  senna leaf extract oral tablet: 2 tab(s) orally once a day (at bedtime)  Zofran ODT 8 mg oral tablet, disintegratin tab(s) orally 3 times a day, As Needed   colchicine 0.6 mg oral tablet: 1 tab(s) orally every 12 hours  Compazine 5 mg oral tablet: 1 tab(s) orally every 6 hours, As Needed  diazePAM 2 mg oral tablet: 1 tab(s) orally 2 times a day, As Needed  Dilaudid 4 mg oral tablet: 1 tab(s) orally every 4 hours, As Needed  FLUoxetine 40 mg oral capsule: 1 cap(s) orally once a day  Lyrica 150 mg oral capsule: 1 cap(s) orally 2 times a day hold if lethargic and do not take it with narcotics at the same time   methadone 5 mg oral tablet:  1.5 tablets at 6AM, 1.5 tablets at 2PM, 1.5 tablets at 10PM  methylphenidate 10 mg oral tablet: 1 tab(s) orally 2 times a day  Namenda 10 mg oral tablet: 1 tab(s) orally 2 times a day  pantoprazole 40 mg oral delayed release tablet: 1 tab(s) orally once a day (before a meal)  senna leaf extract oral tablet: 2 tab(s) orally once a day (at bedtime)  Zofran ODT 8 mg oral tablet, disintegratin tab(s) orally 3 times a day, As Needed

## 2022-10-27 NOTE — CONSULT NOTE ADULT - SUBJECTIVE AND OBJECTIVE BOX
38 yo F known patient at [facility], Dr Chapa, metastatic ER/[place]-,HER2 +breast cancer on Enhertu last on 10/24/22, brain mets s/p WBRT, hx malignant pleural effusion, pericardial effusion w/ tamponade, anxiety/depression, recent dc from [facility] 9/28 for AMS/slurred speech likely from polypharmacy and olanzapine was discontinued presents from home for episode of altered mental status, lethargy and confusion.  Usual state of health night prior but in am she was noted to be lethargic per family. unsure if she took extra medications as pill bottles are next to her bed but also has all her medications set up on weekly pill box.  Family gave ativan at home without improvement and brought to ER via EMS.  in ER, CT head negative and request admission for further management. per /friend at bedside patient mental status markedly improved.     Seen at bedside,    ROS  negative other than HPI    PAST MEDICAL & SURGICAL HISTORY:  H/O compression fracture of spine      Anxiety      Metastatic breast cancer      H/O pleural effusion      Pericardial effusion      S/P tonsillectomy      H/O chest tube placement  12/23/21      S/P pericardiocentesis  12/28/21        Social History:  FAMILY HISTORY:  FH: CVA (cerebrovascular accident)    MEDICATIONS  (STANDING):  colchicine 0.6 milliGRAM(s) Oral every 12 hours  FLUoxetine 40 milliGRAM(s) Oral daily  memantine 10 milliGRAM(s) Oral two times a day  methadone    Tablet 7.5 milliGRAM(s) Oral every 8 hours  methylphenidate 10 milliGRAM(s) Oral two times a day  pantoprazole    Tablet 40 milliGRAM(s) Oral before breakfast  pregabalin 150 milliGRAM(s) Oral two times a day  senna 2 Tablet(s) Oral at bedtime    ICU Vital Signs Last 24 Hrs  T(C): 36.8 (27 Oct 2022 04:36), Max: 36.8 (27 Oct 2022 04:36)  T(F): 98.3 (27 Oct 2022 04:36), Max: 98.3 (27 Oct 2022 04:36)  HR: 73 (27 Oct 2022 04:36) (70 - 80)  BP: 104/71 (27 Oct 2022 04:36) (84/52 - 133/75)  BP(mean): --  ABP: --  ABP(mean): --  RR: 18 (27 Oct 2022 04:36) (16 - 20)  SpO2: 94% (27 Oct 2022 04:36) (94% - 99%)    O2 Parameters below as of 27 Oct 2022 04:36  Patient On (Oxygen Delivery Method): room air      Gen - lethargic  Heart - S1S2 RRR  Lung - Dec BS b/l  Abd - soft nd nt  Ext - no edema                          11.4   5.45  )-----------( 301      ( 26 Oct 2022 13:30 )             35.6     10-26    141  |  102  |  14.9  ----------------------------<  72  3.6   |  29.0  |  0.63    Ca    9.2      26 Oct 2022 13:30    TPro  6.4<L>  /  Alb  3.7  /  TBili  0.8  /  DBili  x   /  AST  30  /  ALT  22  /  AlkPhos  80  10-26           38 yo F known patient at [facility], Dr Chapa, metastatic ER/[place]-,HER2 +breast cancer on Enhertu last on 10/24/22, brain mets s/p WBRT, hx malignant pleural effusion, pericardial effusion w/ tamponade, anxiety/depression, recent dc from [facility] 9/28 for AMS/slurred speech likely from polypharmacy and olanzapine was discontinued presents from home for episode of altered mental status, lethargy and confusion.  Usual state of health night prior but in am she was noted to be lethargic per family. unsure if she took extra medications as pill bottles are next to her bed but also has all her medications set up on weekly pill box.  Family gave ativan at home without improvement and brought to ER via EMS.  in ER, CT head negative and request admission for further management. per /friend at bedside patient mental status markedly improved.     Seen at bedside,alert and oriented this am but says feels "fuzzy"    ROS  negative other than HPI    PAST MEDICAL & SURGICAL HISTORY:  H/O compression fracture of spine      Anxiety      Metastatic breast cancer      H/O pleural effusion      Pericardial effusion      S/P tonsillectomy      H/O chest tube placement  12/23/21      S/P pericardiocentesis  12/28/21        Social History:  FAMILY HISTORY:  FH: CVA (cerebrovascular accident)    MEDICATIONS  (STANDING):  colchicine 0.6 milliGRAM(s) Oral every 12 hours  FLUoxetine 40 milliGRAM(s) Oral daily  memantine 10 milliGRAM(s) Oral two times a day  methadone    Tablet 7.5 milliGRAM(s) Oral every 8 hours  methylphenidate 10 milliGRAM(s) Oral two times a day  pantoprazole    Tablet 40 milliGRAM(s) Oral before breakfast  pregabalin 150 milliGRAM(s) Oral two times a day  senna 2 Tablet(s) Oral at bedtime    ICU Vital Signs Last 24 Hrs  T(C): 36.8 (27 Oct 2022 04:36), Max: 36.8 (27 Oct 2022 04:36)  T(F): 98.3 (27 Oct 2022 04:36), Max: 98.3 (27 Oct 2022 04:36)  HR: 73 (27 Oct 2022 04:36) (70 - 80)  BP: 104/71 (27 Oct 2022 04:36) (84/52 - 133/75)  BP(mean): --  ABP: --  ABP(mean): --  RR: 18 (27 Oct 2022 04:36) (16 - 20)  SpO2: 94% (27 Oct 2022 04:36) (94% - 99%)    O2 Parameters below as of 27 Oct 2022 04:36  Patient On (Oxygen Delivery Method): room air      Gen - lethargic  Heart - S1S2 RRR  Lung - Dec BS b/l  Abd - soft nd nt  Ext - no edema                          11.4   5.45  )-----------( 301      ( 26 Oct 2022 13:30 )             35.6     10-26    141  |  102  |  14.9  ----------------------------<  72  3.6   |  29.0  |  0.63    Ca    9.2      26 Oct 2022 13:30    TPro  6.4<L>  /  Alb  3.7  /  TBili  0.8  /  DBili  x   /  AST  30  /  ALT  22  /  AlkPhos  80  10-26

## 2022-10-27 NOTE — DISCHARGE NOTE NURSING/CASE MANAGEMENT/SOCIAL WORK - NSDCPEFALRISK_GEN_ALL_CORE
For information on Fall & Injury Prevention, visit: [email or web address] OR  [email or web address] OR  https://www.cdc.gov/steadi/patient.html

## 2022-10-27 NOTE — PROGRESS NOTE ADULT - ASSESSMENT
36 yo F known patient at [facility], Dr Chapa, metastatic ER/HI-,HER2 +breast cancer on Enhertu last on 10/24/22, brain mets s/p WBRT, hx malignant pleural effusion, pericardial effusion w/ tamponade, anxiety/depression, recent dc from [facility] 9/28 for AMS/slurred speech likely from polypharmacy and olanzapine was discontinued presents from home for episode of altered mental status, lethargy and confusion.  Usual state of health night prior but in am she was noted to be lethargic per family. unsure if she took extra medications as pill bottles are next to her     1- Acute metabolic encephalopathy   likely due to polypharmacy   instructed the pt's  to monitor and administer medication   off olanzapine stopped last DC   will update     2- Metastatic breast cancer   follow up with oncologist after DC

## 2022-10-27 NOTE — PATIENT PROFILE ADULT - FALL HARM RISK - HARM RISK INTERVENTIONS

## 2022-10-27 NOTE — CONSULT NOTE ADULT - ASSESSMENT
38 yo F known patient at [facility], Dr Chapa, metastatic ER/MD-,HER2 +breast cancer on Enhertu last on 10/24/22, brain mets s/p WBRT, hx malignant pleural effusion, pericardial effusion w/ tamponade, anxiety/depression, recent dc from [facility] 9/28 for AMS/slurred speech likely from polypharmacy and olanzapine was discontinued presents from home for episode of altered mental status, lethargy and confusion.    1) Breast cancer  as above  no inpatient intervention  f/up with Dr Chapa upon DC    2) Encephalopathy  recently had brain MRI on 10/21 as outpatient which showed stable findings  CTH in ER negative  likely polypharmacy related  Olanzipine on hold  outpatient f/up with palliative MD Dr Benson   neuro checks    3) DVT ppx

## 2022-10-27 NOTE — DISCHARGE NOTE PROVIDER - ATTENDING DISCHARGE PHYSICAL EXAMINATION:
Pt is seen this morning   feeling well   no new complaints   Vital Signs Last 24 Hrs  T(C): 36.8 (28 Oct 2022 04:30), Max: 36.8 (27 Oct 2022 11:00)  T(F): 98.2 (28 Oct 2022 04:30), Max: 98.3 (27 Oct 2022 11:00)  HR: 76 (28 Oct 2022 04:30) (73 - 81)  BP: 105/71 (28 Oct 2022 04:30) (98/62 - 112/75)  BP(mean): --  RR: 20 (28 Oct 2022 04:30) (20 - 20)  SpO2: 95% (28 Oct 2022 04:30) (95% - 95%)    Lungs : CTA bilateral   Heart : regular s1 /s2   Abd soft no tenderness , bs positive   ext ; no pretibial edema   Neuro awake alert oriented

## 2022-10-28 VITALS
DIASTOLIC BLOOD PRESSURE: 82 MMHG | TEMPERATURE: 99 F | SYSTOLIC BLOOD PRESSURE: 116 MMHG | RESPIRATION RATE: 18 BRPM | HEART RATE: 87 BPM | OXYGEN SATURATION: 93 %

## 2022-10-28 LAB
AMMONIA BLD-MCNC: 18 UMOL/L — SIGNIFICANT CHANGE UP (ref 11–55)
BASOPHILS # BLD AUTO: 0.01 K/UL — SIGNIFICANT CHANGE UP (ref 0–0.2)
BASOPHILS NFR BLD AUTO: 0.2 % — SIGNIFICANT CHANGE UP (ref 0–2)
EOSINOPHIL # BLD AUTO: 0.07 K/UL — SIGNIFICANT CHANGE UP (ref 0–0.5)
EOSINOPHIL NFR BLD AUTO: 1.5 % — SIGNIFICANT CHANGE UP (ref 0–6)
FOLATE SERPL-MCNC: 6.7 NG/ML — SIGNIFICANT CHANGE UP
HCT VFR BLD CALC: 31.9 % — LOW (ref 34.5–45)
HGB BLD-MCNC: 10 G/DL — LOW (ref 11.5–15.5)
IMM GRANULOCYTES NFR BLD AUTO: 0.4 % — SIGNIFICANT CHANGE UP (ref 0–0.9)
LYMPHOCYTES # BLD AUTO: 0.8 K/UL — LOW (ref 1–3.3)
LYMPHOCYTES # BLD AUTO: 17.6 % — SIGNIFICANT CHANGE UP (ref 13–44)
MCHC RBC-ENTMCNC: 29 PG — SIGNIFICANT CHANGE UP (ref 27–34)
MCHC RBC-ENTMCNC: 31.3 GM/DL — LOW (ref 32–36)
MCV RBC AUTO: 92.5 FL — SIGNIFICANT CHANGE UP (ref 80–100)
MONOCYTES # BLD AUTO: 0.16 K/UL — SIGNIFICANT CHANGE UP (ref 0–0.9)
MONOCYTES NFR BLD AUTO: 3.5 % — SIGNIFICANT CHANGE UP (ref 2–14)
NEUTROPHILS # BLD AUTO: 3.49 K/UL — SIGNIFICANT CHANGE UP (ref 1.8–7.4)
NEUTROPHILS NFR BLD AUTO: 76.8 % — SIGNIFICANT CHANGE UP (ref 43–77)
PLATELET # BLD AUTO: 259 K/UL — SIGNIFICANT CHANGE UP (ref 150–400)
RBC # BLD: 3.45 M/UL — LOW (ref 3.8–5.2)
RBC # FLD: 14.7 % — HIGH (ref 10.3–14.5)
TSH SERPL-MCNC: 4.01 UIU/ML — SIGNIFICANT CHANGE UP (ref 0.27–4.2)
VIT B12 SERPL-MCNC: 460 PG/ML — SIGNIFICANT CHANGE UP (ref 232–1245)
WBC # BLD: 4.55 K/UL — SIGNIFICANT CHANGE UP (ref 3.8–10.5)
WBC # FLD AUTO: 4.55 K/UL — SIGNIFICANT CHANGE UP (ref 3.8–10.5)

## 2022-10-28 PROCEDURE — 84132 ASSAY OF SERUM POTASSIUM: CPT

## 2022-10-28 PROCEDURE — 83605 ASSAY OF LACTIC ACID: CPT

## 2022-10-28 PROCEDURE — 82140 ASSAY OF AMMONIA: CPT

## 2022-10-28 PROCEDURE — 85730 THROMBOPLASTIN TIME PARTIAL: CPT

## 2022-10-28 PROCEDURE — 99239 HOSP IP/OBS DSCHRG MGMT >30: CPT

## 2022-10-28 PROCEDURE — 87040 BLOOD CULTURE FOR BACTERIA: CPT

## 2022-10-28 PROCEDURE — 85025 COMPLETE CBC W/AUTO DIFF WBC: CPT

## 2022-10-28 PROCEDURE — 84443 ASSAY THYROID STIM HORMONE: CPT

## 2022-10-28 PROCEDURE — 82947 ASSAY GLUCOSE BLOOD QUANT: CPT

## 2022-10-28 PROCEDURE — G1004: CPT

## 2022-10-28 PROCEDURE — 82803 BLOOD GASES ANY COMBINATION: CPT

## 2022-10-28 PROCEDURE — 36415 COLL VENOUS BLD VENIPUNCTURE: CPT

## 2022-10-28 PROCEDURE — 85018 HEMOGLOBIN: CPT

## 2022-10-28 PROCEDURE — 82607 VITAMIN B-12: CPT

## 2022-10-28 PROCEDURE — 80053 COMPREHEN METABOLIC PANEL: CPT

## 2022-10-28 PROCEDURE — 82330 ASSAY OF CALCIUM: CPT

## 2022-10-28 PROCEDURE — 99285 EMERGENCY DEPT VISIT HI MDM: CPT | Mod: 25

## 2022-10-28 PROCEDURE — 96374 THER/PROPH/DIAG INJ IV PUSH: CPT

## 2022-10-28 PROCEDURE — 93005 ELECTROCARDIOGRAM TRACING: CPT

## 2022-10-28 PROCEDURE — 85610 PROTHROMBIN TIME: CPT

## 2022-10-28 PROCEDURE — 82746 ASSAY OF FOLIC ACID SERUM: CPT

## 2022-10-28 PROCEDURE — 70450 CT HEAD/BRAIN W/O DYE: CPT | Mod: ME

## 2022-10-28 PROCEDURE — 84295 ASSAY OF SERUM SODIUM: CPT

## 2022-10-28 PROCEDURE — 71045 X-RAY EXAM CHEST 1 VIEW: CPT

## 2022-10-28 PROCEDURE — 82435 ASSAY OF BLOOD CHLORIDE: CPT

## 2022-10-28 PROCEDURE — 85014 HEMATOCRIT: CPT

## 2022-10-28 PROCEDURE — 0225U NFCT DS DNA&RNA 21 SARSCOV2: CPT

## 2022-10-28 RX ORDER — DIAZEPAM 5 MG
1 TABLET ORAL
Qty: 0 | Refills: 0 | DISCHARGE

## 2022-10-28 RX ORDER — DIAZEPAM 5 MG
1 TABLET ORAL
Qty: 0 | Refills: 0 | DISCHARGE
Start: 2022-10-28

## 2022-10-28 RX ORDER — OLANZAPINE 15 MG/1
1 TABLET, FILM COATED ORAL
Qty: 0 | Refills: 0 | DISCHARGE

## 2022-10-28 RX ADMIN — Medication 40 MILLIGRAM(S): at 12:46

## 2022-10-28 RX ADMIN — Medication 2 MILLIGRAM(S): at 00:20

## 2022-10-28 RX ADMIN — HYDROMORPHONE HYDROCHLORIDE 4 MILLIGRAM(S): 2 INJECTION INTRAMUSCULAR; INTRAVENOUS; SUBCUTANEOUS at 05:19

## 2022-10-28 RX ADMIN — HYDROMORPHONE HYDROCHLORIDE 4 MILLIGRAM(S): 2 INJECTION INTRAMUSCULAR; INTRAVENOUS; SUBCUTANEOUS at 11:35

## 2022-10-28 RX ADMIN — Medication 10 MILLIGRAM(S): at 07:13

## 2022-10-28 RX ADMIN — HYDROMORPHONE HYDROCHLORIDE 4 MILLIGRAM(S): 2 INJECTION INTRAMUSCULAR; INTRAVENOUS; SUBCUTANEOUS at 10:50

## 2022-10-28 RX ADMIN — SODIUM CHLORIDE 85 MILLILITER(S): 9 INJECTION INTRAMUSCULAR; INTRAVENOUS; SUBCUTANEOUS at 07:16

## 2022-10-28 RX ADMIN — MEMANTINE HYDROCHLORIDE 10 MILLIGRAM(S): 10 TABLET ORAL at 07:12

## 2022-10-28 RX ADMIN — HYDROMORPHONE HYDROCHLORIDE 4 MILLIGRAM(S): 2 INJECTION INTRAMUSCULAR; INTRAVENOUS; SUBCUTANEOUS at 04:19

## 2022-10-28 RX ADMIN — METHADONE HYDROCHLORIDE 7.5 MILLIGRAM(S): 40 TABLET ORAL at 07:12

## 2022-10-28 RX ADMIN — Medication 150 MILLIGRAM(S): at 07:13

## 2022-10-28 RX ADMIN — Medication 0.6 MILLIGRAM(S): at 07:12

## 2022-10-28 RX ADMIN — PANTOPRAZOLE SODIUM 40 MILLIGRAM(S): 20 TABLET, DELAYED RELEASE ORAL at 07:13

## 2022-10-28 NOTE — PROGRESS NOTE ADULT - ASSESSMENT
38 yo F known patient at [facility], Dr Chapa, metastatic ER/[place]-,HER2 +breast cancer on Enhertu last on 10/24/22, brain mets s/p WBRT, hx malignant pleural effusion, pericardial effusion w/ tamponade, anxiety/depression, recent dc from [facility] 9/28 for AMS/slurred speech likely from polypharmacy and olanzapine was discontinued presents from home for episode of altered mental status, lethargy and confusion.    1) Breast cancer  as above  no inpatient intervention  f/up with Dr Chapa upon DC    2) Encephalopathy  recently had brain MRI on 10/21 as outpatient which showed stable findings  CTH in ER negative  likely polypharmacy related  Olanzipine on hold  outpatient f/up with palliative MD Dr Benson   neuro checks  mental status improved     3) DVT ppx    d/c planning today

## 2022-10-28 NOTE — PROGRESS NOTE ADULT - SUBJECTIVE AND OBJECTIVE BOX
Patient is a 37y old  Female who presents with a chief complaint of AMS   pt is awake slow responses , she is c/o feeling lowzy , not at baseline still     d/w her          chart /  H and P reviewed       ALLERGIES:  Perjeta (Other (Severe))  pertuzumab (Other (Severe))    MEDICATIONS  (STANDING):  colchicine 0.6 milliGRAM(s) Oral every 12 hours  FLUoxetine 40 milliGRAM(s) Oral daily  memantine 10 milliGRAM(s) Oral two times a day  methadone    Tablet 7.5 milliGRAM(s) Oral every 8 hours  methylphenidate 10 milliGRAM(s) Oral two times a day  pantoprazole    Tablet 40 milliGRAM(s) Oral before breakfast  pregabalin 150 milliGRAM(s) Oral two times a day  senna 2 Tablet(s) Oral at bedtime    MEDICATIONS  (PRN):  acetaminophen     Tablet .. 650 milliGRAM(s) Oral every 6 hours PRN Temp greater or equal to 38C (100.4F), Mild Pain (1 - 3), Moderate Pain (4 - 6)  diazepam    Tablet 2 milliGRAM(s) Oral every 8 hours PRN anxiety/muscle spasms  HYDROmorphone   Tablet 4 milliGRAM(s) Oral every 4 hours PRN Severe Pain (7 - 10)    Vital Signs Last 24 Hrs  T(F): 98.3 (27 Oct 2022 11:00), Max: 98.3 (27 Oct 2022 04:36)  HR: 73 (27 Oct 2022 11:00) (73 - 80)  BP: 98/62 (27 Oct 2022 11:00) (84/52 - 104/71)  RR: 20 (27 Oct 2022 11:00) (18 - 20)  SpO2: 95% (27 Oct 2022 11:00) (94% - 95%)  I&O's Summary      PHYSICAL EXAM:  General: awake alert slow responses , hard of hearing   ENT: mouth mucosa moist   Neck: supple, no JVD   Lungs: CTA bilateral , no wheezing   Cardio: RRR, S1/S2, No murmur  Abdomen: Soft, Nontender, Nondistended; Bowel sounds present  Extremities: No calf tenderness, No pitting edema    LABS:                        11.4   5.45  )-----------( 301      ( 26 Oct 2022 13:30 )             35.6     10-26    141  |  102  |  14.9  ----------------------------<  72  3.6   |  29.0  |  0.63    Ca    9.2      26 Oct 2022 13:30    TPro  6.4  /  Alb  3.7  /  TBili  0.8  /  DBili  x   /  AST  30  /  ALT  22  /  AlkPhos  80  10-26          PT/INR - ( 26 Oct 2022 13:30 )   PT: 12.2 sec;   INR: 1.05 ratio         PTT - ( 26 Oct 2022 13:30 )  PTT:32.8 sec              13:30 - VBG - pH: 7.370 | pCO2: 53    | pO2: 54    | Lactate: 1.90                       RADIOLOGY & ADDITIONAL TESTS:      
36 yo F known patient at [facility], Dr Chapa, metastatic ER/[place]-,HER2 +breast cancer on Enhertu last on 10/24/22, brain mets s/p WBRT, hx malignant pleural effusion, pericardial effusion w/ tamponade, anxiety/depression, recent dc from [facility] 9/28 for AMS/slurred speech likely from polypharmacy and olanzapine was discontinued presents from home for episode of altered mental status, lethargy and confusion.  Usual state of health night prior but in am she was noted to be lethargic per family. unsure if she took extra medications as pill bottles are next to her bed but also has all her medications set up on weekly pill box.  Family gave ativan at home without improvement and brought to ER via EMS.  in ER, CT head negative and request admission for further management. per /friend at bedside patient mental status markedly improved.     Seen at bedside,alert and oriented this am   d/c plan for today     ROS  negative other than HPI    PAST MEDICAL & SURGICAL HISTORY:  H/O compression fracture of spine      Anxiety      Metastatic breast cancer      H/O pleural effusion      Pericardial effusion      S/P tonsillectomy      H/O chest tube placement  12/23/21      S/P pericardiocentesis  12/28/21        Social History:  FAMILY HISTORY:  FH: CVA (cerebrovascular accident)    MEDICATIONS  (STANDING):  colchicine 0.6 milliGRAM(s) Oral every 12 hours  FLUoxetine 40 milliGRAM(s) Oral daily  memantine 10 milliGRAM(s) Oral two times a day  methadone    Tablet 7.5 milliGRAM(s) Oral every 8 hours  methylphenidate 10 milliGRAM(s) Oral two times a day  pantoprazole    Tablet 40 milliGRAM(s) Oral before breakfast  pregabalin 150 milliGRAM(s) Oral two times a day  senna 2 Tablet(s) Oral at bedtime    Heart - S1S2 RRR  Lung - Dec BS b/l  Abd - soft nd nt  Ext - no edema                            10.0   4.55  )-----------( 259      ( 28 Oct 2022 04:50 )             31.9                           11.4   5.45  )-----------( 301      ( 26 Oct 2022 13:30 )             35.6     10-26    141  |  102  |  14.9  ----------------------------<  72  3.6   |  29.0  |  0.63    Ca    9.2      26 Oct 2022 13:30    TPro  6.4<L>  /  Alb  3.7  /  TBili  0.8  /  DBili  x   /  AST  30  /  ALT  22  /  AlkPhos  80  10-26

## 2022-11-21 ENCOUNTER — OUTPATIENT (OUTPATIENT)
Dept: OUTPATIENT SERVICES | Facility: HOSPITAL | Age: 37
LOS: 1 days | End: 2022-11-21
Payer: MEDICAID

## 2022-11-21 DIAGNOSIS — Z98.890 OTHER SPECIFIED POSTPROCEDURAL STATES: Chronic | ICD-10-CM

## 2022-11-21 DIAGNOSIS — Z90.89 ACQUIRED ABSENCE OF OTHER ORGANS: Chronic | ICD-10-CM

## 2022-11-21 DIAGNOSIS — R93.7 ABNORMAL FINDINGS ON DIAGNOSTIC IMAGING OF OTHER PARTS OF MUSCULOSKELETAL SYSTEM: ICD-10-CM

## 2022-11-21 PROCEDURE — 93306 TTE W/DOPPLER COMPLETE: CPT

## 2022-11-21 PROCEDURE — 93306 TTE W/DOPPLER COMPLETE: CPT | Mod: 26

## 2022-11-24 ENCOUNTER — INPATIENT (INPATIENT)
Facility: HOSPITAL | Age: 37
LOS: 2 days | Discharge: HOME | DRG: 392 | End: 2022-11-27
Attending: INTERNAL MEDICINE | Admitting: INTERNAL MEDICINE
Payer: MEDICAID

## 2022-11-24 VITALS
SYSTOLIC BLOOD PRESSURE: 118 MMHG | WEIGHT: 147.05 LBS | HEART RATE: 99 BPM | OXYGEN SATURATION: 97 % | TEMPERATURE: 98 F | HEIGHT: 61 IN | RESPIRATION RATE: 18 BRPM | DIASTOLIC BLOOD PRESSURE: 82 MMHG

## 2022-11-24 DIAGNOSIS — Z98.890 OTHER SPECIFIED POSTPROCEDURAL STATES: Chronic | ICD-10-CM

## 2022-11-24 DIAGNOSIS — K52.9 NONINFECTIVE GASTROENTERITIS AND COLITIS, UNSPECIFIED: ICD-10-CM

## 2022-11-24 DIAGNOSIS — Z90.89 ACQUIRED ABSENCE OF OTHER ORGANS: Chronic | ICD-10-CM

## 2022-11-24 LAB
ALBUMIN SERPL ELPH-MCNC: 4.1 G/DL — SIGNIFICANT CHANGE UP (ref 3.3–5.2)
ALP SERPL-CCNC: 116 U/L — SIGNIFICANT CHANGE UP (ref 40–120)
ALT FLD-CCNC: 28 U/L — SIGNIFICANT CHANGE UP
ANION GAP SERPL CALC-SCNC: 14 MMOL/L — SIGNIFICANT CHANGE UP (ref 5–17)
ANISOCYTOSIS BLD QL: SIGNIFICANT CHANGE UP
AST SERPL-CCNC: 27 U/L — SIGNIFICANT CHANGE UP
BASOPHILS # BLD AUTO: 0 K/UL — SIGNIFICANT CHANGE UP (ref 0–0.2)
BASOPHILS NFR BLD AUTO: 0 % — SIGNIFICANT CHANGE UP (ref 0–2)
BILIRUB SERPL-MCNC: 0.5 MG/DL — SIGNIFICANT CHANGE UP (ref 0.4–2)
BUN SERPL-MCNC: 7.2 MG/DL — LOW (ref 8–20)
CALCIUM SERPL-MCNC: 8.7 MG/DL — SIGNIFICANT CHANGE UP (ref 8.4–10.5)
CHLORIDE SERPL-SCNC: 100 MMOL/L — SIGNIFICANT CHANGE UP (ref 96–108)
CO2 SERPL-SCNC: 23 MMOL/L — SIGNIFICANT CHANGE UP (ref 22–29)
CREAT SERPL-MCNC: 0.58 MG/DL — SIGNIFICANT CHANGE UP (ref 0.5–1.3)
DACRYOCYTES BLD QL SMEAR: SLIGHT — SIGNIFICANT CHANGE UP
EGFR: 120 ML/MIN/1.73M2 — SIGNIFICANT CHANGE UP
EOSINOPHIL # BLD AUTO: 0 K/UL — SIGNIFICANT CHANGE UP (ref 0–0.5)
EOSINOPHIL NFR BLD AUTO: 0 % — SIGNIFICANT CHANGE UP (ref 0–6)
GIANT PLATELETS BLD QL SMEAR: PRESENT — SIGNIFICANT CHANGE UP
GLUCOSE SERPL-MCNC: 105 MG/DL — HIGH (ref 70–99)
HCG SERPL-ACNC: <4 MIU/ML — SIGNIFICANT CHANGE UP
HCT VFR BLD CALC: 33 % — LOW (ref 34.5–45)
HGB BLD-MCNC: 11 G/DL — LOW (ref 11.5–15.5)
LIDOCAIN IGE QN: 9 U/L — LOW (ref 22–51)
LYMPHOCYTES # BLD AUTO: 0.31 K/UL — LOW (ref 1–3.3)
LYMPHOCYTES # BLD AUTO: 5.3 % — LOW (ref 13–44)
MACROCYTES BLD QL: SLIGHT — SIGNIFICANT CHANGE UP
MANUAL SMEAR VERIFICATION: SIGNIFICANT CHANGE UP
MCHC RBC-ENTMCNC: 28.8 PG — SIGNIFICANT CHANGE UP (ref 27–34)
MCHC RBC-ENTMCNC: 33.3 GM/DL — SIGNIFICANT CHANGE UP (ref 32–36)
MCV RBC AUTO: 86.4 FL — SIGNIFICANT CHANGE UP (ref 80–100)
MICROCYTES BLD QL: SLIGHT — SIGNIFICANT CHANGE UP
MONOCYTES # BLD AUTO: 0.21 K/UL — SIGNIFICANT CHANGE UP (ref 0–0.9)
MONOCYTES NFR BLD AUTO: 3.5 % — SIGNIFICANT CHANGE UP (ref 2–14)
NEUTROPHILS # BLD AUTO: 5.26 K/UL — SIGNIFICANT CHANGE UP (ref 1.8–7.4)
NEUTROPHILS NFR BLD AUTO: 89.5 % — HIGH (ref 43–77)
NRBC # BLD: 1 /100 — HIGH (ref 0–0)
OVALOCYTES BLD QL SMEAR: SLIGHT — SIGNIFICANT CHANGE UP
PLAT MORPH BLD: NORMAL — SIGNIFICANT CHANGE UP
PLATELET # BLD AUTO: 254 K/UL — SIGNIFICANT CHANGE UP (ref 150–400)
POIKILOCYTOSIS BLD QL AUTO: SLIGHT — SIGNIFICANT CHANGE UP
POLYCHROMASIA BLD QL SMEAR: SLIGHT — SIGNIFICANT CHANGE UP
POTASSIUM SERPL-MCNC: 3.6 MMOL/L — SIGNIFICANT CHANGE UP (ref 3.5–5.3)
POTASSIUM SERPL-SCNC: 3.6 MMOL/L — SIGNIFICANT CHANGE UP (ref 3.5–5.3)
PROT SERPL-MCNC: 7 G/DL — SIGNIFICANT CHANGE UP (ref 6.6–8.7)
RAPID RVP RESULT: DETECTED
RBC # BLD: 3.82 M/UL — SIGNIFICANT CHANGE UP (ref 3.8–5.2)
RBC # FLD: 14.5 % — SIGNIFICANT CHANGE UP (ref 10.3–14.5)
RBC BLD AUTO: ABNORMAL
RV+EV RNA SPEC QL NAA+PROBE: DETECTED
SARS-COV-2 RNA SPEC QL NAA+PROBE: SIGNIFICANT CHANGE UP
SODIUM SERPL-SCNC: 137 MMOL/L — SIGNIFICANT CHANGE UP (ref 135–145)
VARIANT LYMPHS # BLD: 1.7 % — SIGNIFICANT CHANGE UP (ref 0–6)
WBC # BLD: 5.88 K/UL — SIGNIFICANT CHANGE UP (ref 3.8–10.5)
WBC # FLD AUTO: 5.88 K/UL — SIGNIFICANT CHANGE UP (ref 3.8–10.5)

## 2022-11-24 PROCEDURE — 74177 CT ABD & PELVIS W/CONTRAST: CPT | Mod: 26,ME

## 2022-11-24 PROCEDURE — G1004: CPT

## 2022-11-24 PROCEDURE — 99285 EMERGENCY DEPT VISIT HI MDM: CPT

## 2022-11-24 RX ORDER — ONDANSETRON 8 MG/1
4 TABLET, FILM COATED ORAL ONCE
Refills: 0 | Status: COMPLETED | OUTPATIENT
Start: 2022-11-24 | End: 2022-11-24

## 2022-11-24 RX ORDER — HYDROMORPHONE HYDROCHLORIDE 2 MG/ML
1 INJECTION INTRAMUSCULAR; INTRAVENOUS; SUBCUTANEOUS ONCE
Refills: 0 | Status: DISCONTINUED | OUTPATIENT
Start: 2022-11-24 | End: 2022-11-24

## 2022-11-24 RX ORDER — KETOROLAC TROMETHAMINE 30 MG/ML
15 SYRINGE (ML) INJECTION ONCE
Refills: 0 | Status: DISCONTINUED | OUTPATIENT
Start: 2022-11-24 | End: 2022-11-24

## 2022-11-24 RX ORDER — SODIUM CHLORIDE 9 MG/ML
1000 INJECTION, SOLUTION INTRAVENOUS ONCE
Refills: 0 | Status: COMPLETED | OUTPATIENT
Start: 2022-11-24 | End: 2022-11-24

## 2022-11-24 RX ORDER — ACETAMINOPHEN 500 MG
975 TABLET ORAL ONCE
Refills: 0 | Status: COMPLETED | OUTPATIENT
Start: 2022-11-24 | End: 2022-11-24

## 2022-11-24 RX ORDER — METOCLOPRAMIDE HCL 10 MG
10 TABLET ORAL ONCE
Refills: 0 | Status: COMPLETED | OUTPATIENT
Start: 2022-11-24 | End: 2022-11-24

## 2022-11-24 RX ORDER — PIPERACILLIN AND TAZOBACTAM 4; .5 G/20ML; G/20ML
3.38 INJECTION, POWDER, LYOPHILIZED, FOR SOLUTION INTRAVENOUS ONCE
Refills: 0 | Status: COMPLETED | OUTPATIENT
Start: 2022-11-24 | End: 2022-11-24

## 2022-11-24 RX ORDER — HYDROMORPHONE HYDROCHLORIDE 2 MG/ML
2 INJECTION INTRAMUSCULAR; INTRAVENOUS; SUBCUTANEOUS ONCE
Refills: 0 | Status: DISCONTINUED | OUTPATIENT
Start: 2022-11-24 | End: 2022-11-24

## 2022-11-24 RX ADMIN — ONDANSETRON 4 MILLIGRAM(S): 8 TABLET, FILM COATED ORAL at 15:55

## 2022-11-24 RX ADMIN — Medication 15 MILLIGRAM(S): at 23:13

## 2022-11-24 RX ADMIN — HYDROMORPHONE HYDROCHLORIDE 1 MILLIGRAM(S): 2 INJECTION INTRAMUSCULAR; INTRAVENOUS; SUBCUTANEOUS at 14:59

## 2022-11-24 RX ADMIN — Medication 975 MILLIGRAM(S): at 23:12

## 2022-11-24 RX ADMIN — ONDANSETRON 4 MILLIGRAM(S): 8 TABLET, FILM COATED ORAL at 23:13

## 2022-11-24 RX ADMIN — PIPERACILLIN AND TAZOBACTAM 200 GRAM(S): 4; .5 INJECTION, POWDER, LYOPHILIZED, FOR SOLUTION INTRAVENOUS at 23:15

## 2022-11-24 RX ADMIN — HYDROMORPHONE HYDROCHLORIDE 1 MILLIGRAM(S): 2 INJECTION INTRAMUSCULAR; INTRAVENOUS; SUBCUTANEOUS at 15:54

## 2022-11-24 RX ADMIN — ONDANSETRON 4 MILLIGRAM(S): 8 TABLET, FILM COATED ORAL at 20:37

## 2022-11-24 RX ADMIN — HYDROMORPHONE HYDROCHLORIDE 2 MILLIGRAM(S): 2 INJECTION INTRAMUSCULAR; INTRAVENOUS; SUBCUTANEOUS at 16:48

## 2022-11-24 RX ADMIN — Medication 10 MILLIGRAM(S): at 14:59

## 2022-11-24 RX ADMIN — HYDROMORPHONE HYDROCHLORIDE 1 MILLIGRAM(S): 2 INJECTION INTRAMUSCULAR; INTRAVENOUS; SUBCUTANEOUS at 20:37

## 2022-11-24 RX ADMIN — Medication 10 MILLIGRAM(S): at 20:38

## 2022-11-24 RX ADMIN — HYDROMORPHONE HYDROCHLORIDE 1 MILLIGRAM(S): 2 INJECTION INTRAMUSCULAR; INTRAVENOUS; SUBCUTANEOUS at 23:11

## 2022-11-24 RX ADMIN — SODIUM CHLORIDE 1000 MILLILITER(S): 9 INJECTION, SOLUTION INTRAVENOUS at 14:58

## 2022-11-24 RX ADMIN — Medication 975 MILLIGRAM(S): at 16:48

## 2022-11-24 RX ADMIN — ONDANSETRON 4 MILLIGRAM(S): 8 TABLET, FILM COATED ORAL at 14:59

## 2022-11-24 RX ADMIN — Medication 15 MILLIGRAM(S): at 16:51

## 2022-11-24 NOTE — ED ADULT TRIAGE NOTE - CHIEF COMPLAINT QUOTE
Pt arrives with c/o stage 4 CA and states severe nausea, decreased PO intake, gen weakness with abdominal pain.

## 2022-11-24 NOTE — ED ADULT NURSE NOTE - NSIMPLEMENTINTERV_GEN_ALL_ED
Implemented All Universal Safety Interventions:  [place] to call system. Call bell, personal items and telephone within reach. Instruct patient to call for assistance. Room bathroom lighting operational. Non-slip footwear when patient is off stretcher. Physically safe environment: no spills, clutter or unnecessary equipment. Stretcher in lowest position, wheels locked, appropriate side rails in place.

## 2022-11-24 NOTE — ED ADULT NURSE NOTE - OBJECTIVE STATEMENT
pt care assumed at 1500, no apparent distress noted at this time. pt received A&Ox4 resting in bed comfortably with spouse at bedside. pt c/o nausea and vomiting for a few days and unable to tolerate PO intake. pt states she feels weak due to her lack of eating. resp are even and unlabored.

## 2022-11-24 NOTE — ED PROVIDER NOTE - OBJECTIVE STATEMENT
37 year old female with PMh metastatic breast CA, pleural and pericardial effusions presents with pain. Pt states that over the past several days she has had severe and nausea and poor PO intake. She reports fatigue and malaise. She states that she has been unable to take her PO dilaudid given the severity of the nausea and she now has diffuse pain, worse in the abd. No fevers, chills, chest pain, SOB.

## 2022-11-24 NOTE — ED ADULT NURSE NOTE - ED STAT RN HANDOFF DETAILS
pt AAOX4, resp. even and unlabored on RA, pt admitted for colitis, abd. pain 8/10, loss of appetite, hx of metastatic stage 4 breast ca

## 2022-11-24 NOTE — ED ADULT NURSE NOTE - NSICDXFAMILYHX_GEN_ALL_CORE_FT
FAMILY HISTORY:  FH: CVA (cerebrovascular accident)     Patient/Caregiver provided printed discharge information.

## 2022-11-25 PROCEDURE — 99222 1ST HOSP IP/OBS MODERATE 55: CPT

## 2022-11-25 RX ORDER — SENNA PLUS 8.6 MG/1
2 TABLET ORAL AT BEDTIME
Refills: 0 | Status: DISCONTINUED | OUTPATIENT
Start: 2022-11-25 | End: 2022-11-27

## 2022-11-25 RX ORDER — ACETAMINOPHEN 500 MG
650 TABLET ORAL EVERY 6 HOURS
Refills: 0 | Status: DISCONTINUED | OUTPATIENT
Start: 2022-11-25 | End: 2022-11-27

## 2022-11-25 RX ORDER — ONDANSETRON 8 MG/1
4 TABLET, FILM COATED ORAL EVERY 8 HOURS
Refills: 0 | Status: DISCONTINUED | OUTPATIENT
Start: 2022-11-25 | End: 2022-11-27

## 2022-11-25 RX ORDER — PANTOPRAZOLE SODIUM 20 MG/1
40 TABLET, DELAYED RELEASE ORAL
Refills: 0 | Status: DISCONTINUED | OUTPATIENT
Start: 2022-11-25 | End: 2022-11-27

## 2022-11-25 RX ORDER — METHYLPHENIDATE HCL 5 MG
10 TABLET ORAL
Refills: 0 | Status: DISCONTINUED | OUTPATIENT
Start: 2022-11-25 | End: 2022-11-27

## 2022-11-25 RX ORDER — PIPERACILLIN AND TAZOBACTAM 4; .5 G/20ML; G/20ML
3.38 INJECTION, POWDER, LYOPHILIZED, FOR SOLUTION INTRAVENOUS EVERY 8 HOURS
Refills: 0 | Status: DISCONTINUED | OUTPATIENT
Start: 2022-11-25 | End: 2022-11-27

## 2022-11-25 RX ORDER — ENOXAPARIN SODIUM 100 MG/ML
40 INJECTION SUBCUTANEOUS EVERY 24 HOURS
Refills: 0 | Status: DISCONTINUED | OUTPATIENT
Start: 2022-11-25 | End: 2022-11-27

## 2022-11-25 RX ORDER — COLCHICINE 0.6 MG
0.6 TABLET ORAL EVERY 12 HOURS
Refills: 0 | Status: DISCONTINUED | OUTPATIENT
Start: 2022-11-25 | End: 2022-11-27

## 2022-11-25 RX ORDER — LANOLIN ALCOHOL/MO/W.PET/CERES
3 CREAM (GRAM) TOPICAL AT BEDTIME
Refills: 0 | Status: DISCONTINUED | OUTPATIENT
Start: 2022-11-25 | End: 2022-11-27

## 2022-11-25 RX ORDER — MEMANTINE HYDROCHLORIDE 10 MG/1
10 TABLET ORAL
Refills: 0 | Status: DISCONTINUED | OUTPATIENT
Start: 2022-11-25 | End: 2022-11-27

## 2022-11-25 RX ORDER — HYDROMORPHONE HYDROCHLORIDE 2 MG/ML
2 INJECTION INTRAMUSCULAR; INTRAVENOUS; SUBCUTANEOUS EVERY 4 HOURS
Refills: 0 | Status: DISCONTINUED | OUTPATIENT
Start: 2022-11-25 | End: 2022-11-27

## 2022-11-25 RX ORDER — METHADONE HYDROCHLORIDE 40 MG/1
7.5 TABLET ORAL EVERY 8 HOURS
Refills: 0 | Status: DISCONTINUED | OUTPATIENT
Start: 2022-11-25 | End: 2022-11-27

## 2022-11-25 RX ORDER — FLUOXETINE HCL 10 MG
40 CAPSULE ORAL DAILY
Refills: 0 | Status: DISCONTINUED | OUTPATIENT
Start: 2022-11-25 | End: 2022-11-27

## 2022-11-25 RX ADMIN — Medication 0.6 MILLIGRAM(S): at 05:52

## 2022-11-25 RX ADMIN — METHADONE HYDROCHLORIDE 7.5 MILLIGRAM(S): 40 TABLET ORAL at 22:08

## 2022-11-25 RX ADMIN — HYDROMORPHONE HYDROCHLORIDE 2 MILLIGRAM(S): 2 INJECTION INTRAMUSCULAR; INTRAVENOUS; SUBCUTANEOUS at 23:01

## 2022-11-25 RX ADMIN — ONDANSETRON 4 MILLIGRAM(S): 8 TABLET, FILM COATED ORAL at 02:29

## 2022-11-25 RX ADMIN — PANTOPRAZOLE SODIUM 40 MILLIGRAM(S): 20 TABLET, DELAYED RELEASE ORAL at 06:33

## 2022-11-25 RX ADMIN — MEMANTINE HYDROCHLORIDE 10 MILLIGRAM(S): 10 TABLET ORAL at 18:15

## 2022-11-25 RX ADMIN — HYDROMORPHONE HYDROCHLORIDE 2 MILLIGRAM(S): 2 INJECTION INTRAMUSCULAR; INTRAVENOUS; SUBCUTANEOUS at 10:38

## 2022-11-25 RX ADMIN — HYDROMORPHONE HYDROCHLORIDE 2 MILLIGRAM(S): 2 INJECTION INTRAMUSCULAR; INTRAVENOUS; SUBCUTANEOUS at 06:33

## 2022-11-25 RX ADMIN — HYDROMORPHONE HYDROCHLORIDE 2 MILLIGRAM(S): 2 INJECTION INTRAMUSCULAR; INTRAVENOUS; SUBCUTANEOUS at 02:32

## 2022-11-25 RX ADMIN — HYDROMORPHONE HYDROCHLORIDE 2 MILLIGRAM(S): 2 INJECTION INTRAMUSCULAR; INTRAVENOUS; SUBCUTANEOUS at 14:33

## 2022-11-25 RX ADMIN — PIPERACILLIN AND TAZOBACTAM 25 GRAM(S): 4; .5 INJECTION, POWDER, LYOPHILIZED, FOR SOLUTION INTRAVENOUS at 22:08

## 2022-11-25 RX ADMIN — HYDROMORPHONE HYDROCHLORIDE 2 MILLIGRAM(S): 2 INJECTION INTRAMUSCULAR; INTRAVENOUS; SUBCUTANEOUS at 18:13

## 2022-11-25 RX ADMIN — Medication 150 MILLIGRAM(S): at 18:14

## 2022-11-25 RX ADMIN — HYDROMORPHONE HYDROCHLORIDE 2 MILLIGRAM(S): 2 INJECTION INTRAMUSCULAR; INTRAVENOUS; SUBCUTANEOUS at 18:55

## 2022-11-25 RX ADMIN — ONDANSETRON 4 MILLIGRAM(S): 8 TABLET, FILM COATED ORAL at 20:17

## 2022-11-25 RX ADMIN — Medication 10 MILLIGRAM(S): at 05:52

## 2022-11-25 RX ADMIN — METHADONE HYDROCHLORIDE 7.5 MILLIGRAM(S): 40 TABLET ORAL at 05:52

## 2022-11-25 RX ADMIN — Medication 10 MILLIGRAM(S): at 18:14

## 2022-11-25 RX ADMIN — PIPERACILLIN AND TAZOBACTAM 25 GRAM(S): 4; .5 INJECTION, POWDER, LYOPHILIZED, FOR SOLUTION INTRAVENOUS at 14:34

## 2022-11-25 RX ADMIN — Medication 40 MILLIGRAM(S): at 10:43

## 2022-11-25 RX ADMIN — SENNA PLUS 2 TABLET(S): 8.6 TABLET ORAL at 22:08

## 2022-11-25 RX ADMIN — HYDROMORPHONE HYDROCHLORIDE 2 MILLIGRAM(S): 2 INJECTION INTRAMUSCULAR; INTRAVENOUS; SUBCUTANEOUS at 02:52

## 2022-11-25 RX ADMIN — Medication 0.6 MILLIGRAM(S): at 18:14

## 2022-11-25 RX ADMIN — METHADONE HYDROCHLORIDE 7.5 MILLIGRAM(S): 40 TABLET ORAL at 15:02

## 2022-11-25 RX ADMIN — Medication 150 MILLIGRAM(S): at 05:52

## 2022-11-25 RX ADMIN — HYDROMORPHONE HYDROCHLORIDE 2 MILLIGRAM(S): 2 INJECTION INTRAMUSCULAR; INTRAVENOUS; SUBCUTANEOUS at 06:48

## 2022-11-25 RX ADMIN — MEMANTINE HYDROCHLORIDE 10 MILLIGRAM(S): 10 TABLET ORAL at 05:52

## 2022-11-25 RX ADMIN — ONDANSETRON 4 MILLIGRAM(S): 8 TABLET, FILM COATED ORAL at 14:33

## 2022-11-25 RX ADMIN — Medication 200 MILLIGRAM(S): at 22:46

## 2022-11-25 RX ADMIN — PIPERACILLIN AND TAZOBACTAM 25 GRAM(S): 4; .5 INJECTION, POWDER, LYOPHILIZED, FOR SOLUTION INTRAVENOUS at 05:52

## 2022-11-25 RX ADMIN — HYDROMORPHONE HYDROCHLORIDE 2 MILLIGRAM(S): 2 INJECTION INTRAMUSCULAR; INTRAVENOUS; SUBCUTANEOUS at 22:46

## 2022-11-25 NOTE — CHART NOTE - NSCHARTNOTEFT_GEN_A_CORE
Pt continues to c/o nausea  Tigan 200mg IM x 1  Discussed with Pharmacist  No contraindications  Continue to monitor
patient being seen for colitis in setting of metastatic breast cancer. patient seen at bedside and states feeling better.     vitals appreciated  pe as per hpi    a/p  36 y/o female with PMH of metastatic breast cancer with mets to brain, lung, liver and bone on active chemo s/p completion of cranial radiation, hx malignant pleural effusion (s/p chest tube 12/23/21), pericardial effusion w/tamponade s/p pericardiocentesis (12/28/21), pathologic compression fx, anxiety came to the ED complaining of nausea and vomiting x 3 days. Patient said she has not been able to tolerate any food or drink, noted diffuse abdominal pain.   In the ED, CT abdomen: suspicious for colitis, RVP: enterovirus. PO challenged but patient could not tolerate it.  patient started on iv zosyn     Colitis   Admit to medical floor   CT abdomen as noted above   Zosyn given in the ED, no WBC but left shift. Will continue given patient is immunocompromise   Blood culture sent, follow up   clears for now advance to full for dinner    Enterovirus infection  Supportive care as needed    Nausea and vomiting  Acute on chronic, likely chemotherapy induced/ colitis   Zofran PRN   atovan rpn   Aspiration precautions    Hx metastatic breast cancer   Continue Methadone 7.5mg TID   Dilaudid 2mg q4h PRN   Pregabalin 200mg BID   Memantine 10mg BID  Follows with [facility]    Hx pericardial effusion  Colchicine 0.6mg BID    Anxiety    Fluoxetine 40mg    Supportive   DVT prophylaxis: Lovenox 40mg (patient with hx of brain met s/p radiation tx, several CT head done after with no acute finding)   Diet: clear liquid advance as tolerated

## 2022-11-25 NOTE — H&P ADULT - HISTORY OF PRESENT ILLNESS
36 y/o female with PMH of metastatic breast cancer with mets to brain, lung, liver and bone on active chemo s/p completion of cranial radiation, hx malignant pleural effusion (s/p chest tube 12/23/21), pericardial effusion w/tamponade s/p pericardiocentesis (12/28/21), pathologic compression fx, anxiety came to the ED complaining of nausea and vomiting x 3 days. Patient said she has not been able to tolerate any food or drink, noted diffuse abdominal pain. No diarrhea, fever, chills, chest pain, shortness of breath, palpitation, dysuria.

## 2022-11-25 NOTE — H&P ADULT - NSHPPHYSICALEXAM_GEN_ALL_CORE
Vital Signs Last 24 Hrs  T(C): 36.6 (25 Nov 2022 00:21), Max: 36.6 (24 Nov 2022 14:06)  T(F): 97.9 (25 Nov 2022 00:21), Max: 97.9 (24 Nov 2022 14:06)  HR: 69 (25 Nov 2022 00:21) (69 - 99)  BP: 116/70 (25 Nov 2022 00:21) (116/70 - 118/82)  BP(mean): --  RR: 18 (25 Nov 2022 00:21) (18 - 18)  SpO2: 98% (25 Nov 2022 00:21) (97% - 98%)    Parameters below as of 25 Nov 2022 00:21  Patient On (Oxygen Delivery Method): room air

## 2022-11-25 NOTE — PATIENT PROFILE ADULT - FALL HARM RISK - HARM RISK INTERVENTIONS

## 2022-11-25 NOTE — H&P ADULT - ASSESSMENT
36 y/o female with PMH of metastatic breast cancer with mets to brain, lung, liver and bone on active chemo s/p completion of cranial radiation, hx malignant pleural effusion (s/p chest tube 12/23/21), pericardial effusion w/tamponade s/p pericardiocentesis (12/28/21), pathologic compression fx, anxiety came to the ED complaining of nausea and vomiting x 3 days. Patient said she has not been able to tolerate any food or drink, noted diffuse abdominal pain.   In the ED, CT abdomen: suspicious for colitis, RVP: enterovirus. PO challenged but patient could not tolerate it.     Colitis   Admit to medical floor   CT abdomen as noted above   Zosyn given in the ED, no WBC but left shift. Will continue given patient is immunocompromise   Blood culture sent, follow up     Enterovirus infection  Supportive care as needed    Nausea and vomiting  Acute on chronic, likely chemotherapy induced/ colitis   Zofran PRN   Valium 2mg bid PRN   Aspiration precautions    Hx metastatic breast cancer   Continue Methadone 7.5mg TID   Dilaudid 2mg q4h PRN   Pregabalin 200mg BID   Memantine 10mg BID  Follows with [facility]    Hx pericardial effusion  Colchicine 0.6mg BID    Anxiety    Fluoxetine 40mg    Supportive   DVT prophylaxis: Lovenox 40mg (patient with hx of brain met s/p radiation tx, several CT head done after with no acute finding)   Diet: clear liquid advance as tolerated     Plan of care discussed with patient

## 2022-11-25 NOTE — ED ADULT NURSE REASSESSMENT NOTE - NS ED NURSE REASSESS COMMENT FT1
pt AAOx4, resp. even and unlabored on RA, pt c/o stage 4 breast cancer w/decreased appetite and increased vomiting over the past several days, pt denies fever/chills, states abd. pain is generalized and 9/10, MD made aware and pt TBA

## 2022-11-26 LAB
ALBUMIN SERPL ELPH-MCNC: 3.6 G/DL — SIGNIFICANT CHANGE UP (ref 3.3–5.2)
ALP SERPL-CCNC: 100 U/L — SIGNIFICANT CHANGE UP (ref 40–120)
ALT FLD-CCNC: 27 U/L — SIGNIFICANT CHANGE UP
ANION GAP SERPL CALC-SCNC: 10 MMOL/L — SIGNIFICANT CHANGE UP (ref 5–17)
ANION GAP SERPL CALC-SCNC: 13 MMOL/L — SIGNIFICANT CHANGE UP (ref 5–17)
AST SERPL-CCNC: 25 U/L — SIGNIFICANT CHANGE UP
BASOPHILS # BLD AUTO: 0.01 K/UL — SIGNIFICANT CHANGE UP (ref 0–0.2)
BASOPHILS NFR BLD AUTO: 0.2 % — SIGNIFICANT CHANGE UP (ref 0–2)
BILIRUB SERPL-MCNC: 0.5 MG/DL — SIGNIFICANT CHANGE UP (ref 0.4–2)
BUN SERPL-MCNC: 4.9 MG/DL — LOW (ref 8–20)
BUN SERPL-MCNC: 5.8 MG/DL — LOW (ref 8–20)
CALCIUM SERPL-MCNC: 8.2 MG/DL — LOW (ref 8.4–10.5)
CALCIUM SERPL-MCNC: 8.3 MG/DL — LOW (ref 8.4–10.5)
CHLORIDE SERPL-SCNC: 101 MMOL/L — SIGNIFICANT CHANGE UP (ref 96–108)
CHLORIDE SERPL-SCNC: 104 MMOL/L — SIGNIFICANT CHANGE UP (ref 96–108)
CO2 SERPL-SCNC: 25 MMOL/L — SIGNIFICANT CHANGE UP (ref 22–29)
CO2 SERPL-SCNC: 26 MMOL/L — SIGNIFICANT CHANGE UP (ref 22–29)
CREAT SERPL-MCNC: 0.62 MG/DL — SIGNIFICANT CHANGE UP (ref 0.5–1.3)
CREAT SERPL-MCNC: 0.75 MG/DL — SIGNIFICANT CHANGE UP (ref 0.5–1.3)
EGFR: 106 ML/MIN/1.73M2 — SIGNIFICANT CHANGE UP
EGFR: 118 ML/MIN/1.73M2 — SIGNIFICANT CHANGE UP
EOSINOPHIL # BLD AUTO: 0.09 K/UL — SIGNIFICANT CHANGE UP (ref 0–0.5)
EOSINOPHIL NFR BLD AUTO: 1.9 % — SIGNIFICANT CHANGE UP (ref 0–6)
GLUCOSE SERPL-MCNC: 88 MG/DL — SIGNIFICANT CHANGE UP (ref 70–99)
GLUCOSE SERPL-MCNC: 97 MG/DL — SIGNIFICANT CHANGE UP (ref 70–99)
HCT VFR BLD CALC: 31.2 % — LOW (ref 34.5–45)
HGB BLD-MCNC: 10 G/DL — LOW (ref 11.5–15.5)
IMM GRANULOCYTES NFR BLD AUTO: 0.4 % — SIGNIFICANT CHANGE UP (ref 0–0.9)
LYMPHOCYTES # BLD AUTO: 0.61 K/UL — LOW (ref 1–3.3)
LYMPHOCYTES # BLD AUTO: 12.8 % — LOW (ref 13–44)
MAGNESIUM SERPL-MCNC: 1.9 MG/DL — SIGNIFICANT CHANGE UP (ref 1.8–2.6)
MCHC RBC-ENTMCNC: 28.2 PG — SIGNIFICANT CHANGE UP (ref 27–34)
MCHC RBC-ENTMCNC: 32.1 GM/DL — SIGNIFICANT CHANGE UP (ref 32–36)
MCV RBC AUTO: 88.1 FL — SIGNIFICANT CHANGE UP (ref 80–100)
MONOCYTES # BLD AUTO: 0.44 K/UL — SIGNIFICANT CHANGE UP (ref 0–0.9)
MONOCYTES NFR BLD AUTO: 9.2 % — SIGNIFICANT CHANGE UP (ref 2–14)
NEUTROPHILS # BLD AUTO: 3.61 K/UL — SIGNIFICANT CHANGE UP (ref 1.8–7.4)
NEUTROPHILS NFR BLD AUTO: 75.5 % — SIGNIFICANT CHANGE UP (ref 43–77)
PLATELET # BLD AUTO: 281 K/UL — SIGNIFICANT CHANGE UP (ref 150–400)
POTASSIUM SERPL-MCNC: 3.2 MMOL/L — LOW (ref 3.5–5.3)
POTASSIUM SERPL-MCNC: 3.3 MMOL/L — LOW (ref 3.5–5.3)
POTASSIUM SERPL-SCNC: 3.2 MMOL/L — LOW (ref 3.5–5.3)
POTASSIUM SERPL-SCNC: 3.3 MMOL/L — LOW (ref 3.5–5.3)
PROT SERPL-MCNC: 6.5 G/DL — LOW (ref 6.6–8.7)
RBC # BLD: 3.54 M/UL — LOW (ref 3.8–5.2)
RBC # FLD: 14.8 % — HIGH (ref 10.3–14.5)
SODIUM SERPL-SCNC: 139 MMOL/L — SIGNIFICANT CHANGE UP (ref 135–145)
SODIUM SERPL-SCNC: 140 MMOL/L — SIGNIFICANT CHANGE UP (ref 135–145)
WBC # BLD: 4.78 K/UL — SIGNIFICANT CHANGE UP (ref 3.8–10.5)
WBC # FLD AUTO: 4.78 K/UL — SIGNIFICANT CHANGE UP (ref 3.8–10.5)

## 2022-11-26 PROCEDURE — 99232 SBSQ HOSP IP/OBS MODERATE 35: CPT

## 2022-11-26 RX ORDER — POTASSIUM CHLORIDE 20 MEQ
40 PACKET (EA) ORAL EVERY 4 HOURS
Refills: 0 | Status: COMPLETED | OUTPATIENT
Start: 2022-11-26 | End: 2022-11-27

## 2022-11-26 RX ADMIN — MEMANTINE HYDROCHLORIDE 10 MILLIGRAM(S): 10 TABLET ORAL at 06:21

## 2022-11-26 RX ADMIN — Medication 0.6 MILLIGRAM(S): at 06:21

## 2022-11-26 RX ADMIN — PIPERACILLIN AND TAZOBACTAM 25 GRAM(S): 4; .5 INJECTION, POWDER, LYOPHILIZED, FOR SOLUTION INTRAVENOUS at 06:22

## 2022-11-26 RX ADMIN — HYDROMORPHONE HYDROCHLORIDE 2 MILLIGRAM(S): 2 INJECTION INTRAMUSCULAR; INTRAVENOUS; SUBCUTANEOUS at 11:08

## 2022-11-26 RX ADMIN — PIPERACILLIN AND TAZOBACTAM 25 GRAM(S): 4; .5 INJECTION, POWDER, LYOPHILIZED, FOR SOLUTION INTRAVENOUS at 15:13

## 2022-11-26 RX ADMIN — HYDROMORPHONE HYDROCHLORIDE 2 MILLIGRAM(S): 2 INJECTION INTRAMUSCULAR; INTRAVENOUS; SUBCUTANEOUS at 20:00

## 2022-11-26 RX ADMIN — HYDROMORPHONE HYDROCHLORIDE 2 MILLIGRAM(S): 2 INJECTION INTRAMUSCULAR; INTRAVENOUS; SUBCUTANEOUS at 07:06

## 2022-11-26 RX ADMIN — METHADONE HYDROCHLORIDE 7.5 MILLIGRAM(S): 40 TABLET ORAL at 06:22

## 2022-11-26 RX ADMIN — Medication 650 MILLIGRAM(S): at 18:16

## 2022-11-26 RX ADMIN — HYDROMORPHONE HYDROCHLORIDE 2 MILLIGRAM(S): 2 INJECTION INTRAMUSCULAR; INTRAVENOUS; SUBCUTANEOUS at 23:56

## 2022-11-26 RX ADMIN — Medication 150 MILLIGRAM(S): at 17:45

## 2022-11-26 RX ADMIN — METHADONE HYDROCHLORIDE 7.5 MILLIGRAM(S): 40 TABLET ORAL at 15:12

## 2022-11-26 RX ADMIN — ONDANSETRON 4 MILLIGRAM(S): 8 TABLET, FILM COATED ORAL at 15:52

## 2022-11-26 RX ADMIN — Medication 150 MILLIGRAM(S): at 06:21

## 2022-11-26 RX ADMIN — HYDROMORPHONE HYDROCHLORIDE 2 MILLIGRAM(S): 2 INJECTION INTRAMUSCULAR; INTRAVENOUS; SUBCUTANEOUS at 15:42

## 2022-11-26 RX ADMIN — ONDANSETRON 4 MILLIGRAM(S): 8 TABLET, FILM COATED ORAL at 23:57

## 2022-11-26 RX ADMIN — Medication 0.6 MILLIGRAM(S): at 17:44

## 2022-11-26 RX ADMIN — ONDANSETRON 4 MILLIGRAM(S): 8 TABLET, FILM COATED ORAL at 06:21

## 2022-11-26 RX ADMIN — HYDROMORPHONE HYDROCHLORIDE 2 MILLIGRAM(S): 2 INJECTION INTRAMUSCULAR; INTRAVENOUS; SUBCUTANEOUS at 19:17

## 2022-11-26 RX ADMIN — PIPERACILLIN AND TAZOBACTAM 25 GRAM(S): 4; .5 INJECTION, POWDER, LYOPHILIZED, FOR SOLUTION INTRAVENOUS at 22:02

## 2022-11-26 RX ADMIN — HYDROMORPHONE HYDROCHLORIDE 2 MILLIGRAM(S): 2 INJECTION INTRAMUSCULAR; INTRAVENOUS; SUBCUTANEOUS at 02:47

## 2022-11-26 RX ADMIN — HYDROMORPHONE HYDROCHLORIDE 2 MILLIGRAM(S): 2 INJECTION INTRAMUSCULAR; INTRAVENOUS; SUBCUTANEOUS at 15:12

## 2022-11-26 RX ADMIN — Medication 40 MILLIGRAM(S): at 11:07

## 2022-11-26 RX ADMIN — Medication 650 MILLIGRAM(S): at 17:46

## 2022-11-26 RX ADMIN — SENNA PLUS 2 TABLET(S): 8.6 TABLET ORAL at 22:02

## 2022-11-26 RX ADMIN — Medication 10 MILLIGRAM(S): at 17:45

## 2022-11-26 RX ADMIN — HYDROMORPHONE HYDROCHLORIDE 2 MILLIGRAM(S): 2 INJECTION INTRAMUSCULAR; INTRAVENOUS; SUBCUTANEOUS at 11:38

## 2022-11-26 RX ADMIN — PANTOPRAZOLE SODIUM 40 MILLIGRAM(S): 20 TABLET, DELAYED RELEASE ORAL at 06:21

## 2022-11-26 RX ADMIN — ENOXAPARIN SODIUM 40 MILLIGRAM(S): 100 INJECTION SUBCUTANEOUS at 11:07

## 2022-11-26 RX ADMIN — METHADONE HYDROCHLORIDE 7.5 MILLIGRAM(S): 40 TABLET ORAL at 22:03

## 2022-11-26 RX ADMIN — Medication 10 MILLIGRAM(S): at 06:21

## 2022-11-26 RX ADMIN — HYDROMORPHONE HYDROCHLORIDE 2 MILLIGRAM(S): 2 INJECTION INTRAMUSCULAR; INTRAVENOUS; SUBCUTANEOUS at 07:21

## 2022-11-26 RX ADMIN — Medication 40 MILLIEQUIVALENT(S): at 23:57

## 2022-11-26 RX ADMIN — HYDROMORPHONE HYDROCHLORIDE 2 MILLIGRAM(S): 2 INJECTION INTRAMUSCULAR; INTRAVENOUS; SUBCUTANEOUS at 03:02

## 2022-11-26 RX ADMIN — MEMANTINE HYDROCHLORIDE 10 MILLIGRAM(S): 10 TABLET ORAL at 17:44

## 2022-11-26 NOTE — PROGRESS NOTE ADULT - ASSESSMENT
36 y/o female with PMH of metastatic breast cancer with mets to brain, lung, liver and bone on active chemo s/p completion of cranial radiation, hx malignant pleural effusion (s/p chest tube 12/23/21), pericardial effusion w/tamponade s/p pericardiocentesis (12/28/21), pathologic compression fx, anxiety came to the ED complaining of nausea and vomiting x 3 days. Patient said she has not been able to tolerate any food or drink, noted diffuse abdominal pain.   In the ED, CT abdomen: suspicious for colitis, RVP: enterovirus. PO challenged but patient could not tolerate it.     #Colitis   - Admit to medical floor   - CT abdomen as noted above   - Zosyn given in the ED, no WBC but left shift. Will continue given patient is immunocompromise   - Blood culture sent, follow up     #Enterovirus infection  - Supportive care as needed    #Nausea and vomiting  - Acute on chronic, likely chemotherapy induced/ colitis   - Zofran PRN   - Valium 2mg bid PRN   - Aspiration precautions    #Hx metastatic breast cancer   - Continue Methadone 7.5mg TID   - Dilaudid 2mg q4h PRN   - Pregabalin 200mg BID   - Memantine 10mg BID  - Follows with [facility]    #Hx pericardial effusion  - Colchicine 0.6mg BID    #Anxiety    - Fluoxetine 40mg    Supportive   DVT prophylaxis: Lovenox 40mg (patient with hx of brain met s/p radiation tx, several CT head done after with no acute finding)   Diet: FLD - advance to regular diet    Plan of care discussed with patient

## 2022-11-26 NOTE — PROGRESS NOTE ADULT - SUBJECTIVE AND OBJECTIVE BOX
CC: Colitis/nausea/vomiting (25 Nov 2022 01:26)    INTERVAL HPI/OVERNIGHT EVENTS:  no acute events overnight    Vital Signs Last 24 Hrs  T(C): 36.7 (26 Nov 2022 12:10), Max: 36.8 (25 Nov 2022 16:22)  T(F): 98 (26 Nov 2022 12:10), Max: 98.3 (25 Nov 2022 16:22)  HR: 78 (26 Nov 2022 12:10) (69 - 105)  BP: 116/81 (26 Nov 2022 12:10) (113/74 - 116/81)  BP(mean): --  RR: 18 (26 Nov 2022 12:10) (18 - 18)  SpO2: 95% (26 Nov 2022 12:10) (94% - 98%)    PHYSICAL EXAM:  General: in no acute distress  Eyes: PERRLA, EOMI; conjunctiva and sclera clear  Head: Normocephalic; atraumatic  ENMT: No nasal discharge; airway clear  Neck: Supple; non tender; no masses  Respiratory: No wheezes, rales or rhonchi  Cardiovascular: Regular rate and rhythm. S1 and S2 Normal; No murmurs, gallops or rubs  Gastrointestinal: Soft non-tender non-distended; Normal bowel sounds  Genitourinary: No costovertebral angle tenderness  Extremities: Normal range of motion, No clubbing, cyanosis or edema  Vascular: Peripheral pulses palpable 2+ bilaterally  Neurological: Alert and oriented x4  Skin: Warm and dry. No acute rash  Psychiatric: Cooperative and appropriate    I&O's Detail                        10.0   4.78  )-----------( 281      ( 26 Nov 2022 05:15 )             31.2     26 Nov 2022 05:15    140    |  104    |  4.9    ----------------------------<  88     3.2     |  26.0   |  0.75     Ca    8.2        26 Nov 2022 05:15  Mg     1.9       26 Nov 2022 05:15    TPro  6.5    /  Alb  3.6    /  TBili  0.5    /  DBili  x      /  AST  25     /  ALT  27     /  AlkPhos  100    26 Nov 2022 05:15    CAPILLARY BLOOD GLUCOSE    LIVER FUNCTIONS - ( 26 Nov 2022 05:15 )  Alb: 3.6 g/dL / Pro: 6.5 g/dL / ALK PHOS: 100 U/L / ALT: 27 U/L / AST: 25 U/L / GGT: x           MEDICATIONS  (STANDING):  colchicine 0.6 milliGRAM(s) Oral every 12 hours  enoxaparin Injectable 40 milliGRAM(s) SubCutaneous every 24 hours  FLUoxetine 40 milliGRAM(s) Oral daily  memantine 10 milliGRAM(s) Oral two times a day  methadone    Tablet 7.5 milliGRAM(s) Oral every 8 hours  methylphenidate 10 milliGRAM(s) Oral two times a day  pantoprazole    Tablet 40 milliGRAM(s) Oral before breakfast  piperacillin/tazobactam IVPB.. 3.375 Gram(s) IV Intermittent every 8 hours  pregabalin 150 milliGRAM(s) Oral two times a day  senna 2 Tablet(s) Oral at bedtime    MEDICATIONS  (PRN):  acetaminophen     Tablet .. 650 milliGRAM(s) Oral every 6 hours PRN Temp greater or equal to 38C (100.4F), Mild Pain (1 - 3)  aluminum hydroxide/magnesium hydroxide/simethicone Suspension 30 milliLiter(s) Oral every 4 hours PRN Dyspepsia  HYDROmorphone  Injectable 2 milliGRAM(s) IV Push every 4 hours PRN Severe Pain (7 - 10)  LORazepam     Tablet 0.5 milliGRAM(s) Oral two times a day PRN Agitation  melatonin 3 milliGRAM(s) Oral at bedtime PRN Insomnia  ondansetron Injectable 4 milliGRAM(s) IV Push every 8 hours PRN Nausea and/or Vomiting      RADIOLOGY & ADDITIONAL TESTS:

## 2022-11-27 ENCOUNTER — TRANSCRIPTION ENCOUNTER (OUTPATIENT)
Age: 37
End: 2022-11-27

## 2022-11-27 VITALS
OXYGEN SATURATION: 97 % | RESPIRATION RATE: 18 BRPM | HEART RATE: 74 BPM | DIASTOLIC BLOOD PRESSURE: 82 MMHG | TEMPERATURE: 98 F | SYSTOLIC BLOOD PRESSURE: 116 MMHG

## 2022-11-27 LAB
ALBUMIN SERPL ELPH-MCNC: 3.8 G/DL — SIGNIFICANT CHANGE UP (ref 3.3–5.2)
ALP SERPL-CCNC: 94 U/L — SIGNIFICANT CHANGE UP (ref 40–120)
ALT FLD-CCNC: 26 U/L — SIGNIFICANT CHANGE UP
ANION GAP SERPL CALC-SCNC: 12 MMOL/L — SIGNIFICANT CHANGE UP (ref 5–17)
AST SERPL-CCNC: 22 U/L — SIGNIFICANT CHANGE UP
BASOPHILS # BLD AUTO: 0.01 K/UL — SIGNIFICANT CHANGE UP (ref 0–0.2)
BASOPHILS NFR BLD AUTO: 0.3 % — SIGNIFICANT CHANGE UP (ref 0–2)
BILIRUB SERPL-MCNC: 0.5 MG/DL — SIGNIFICANT CHANGE UP (ref 0.4–2)
BUN SERPL-MCNC: 5.3 MG/DL — LOW (ref 8–20)
CALCIUM SERPL-MCNC: 8 MG/DL — LOW (ref 8.4–10.5)
CHLORIDE SERPL-SCNC: 103 MMOL/L — SIGNIFICANT CHANGE UP (ref 96–108)
CO2 SERPL-SCNC: 26 MMOL/L — SIGNIFICANT CHANGE UP (ref 22–29)
CREAT SERPL-MCNC: 0.65 MG/DL — SIGNIFICANT CHANGE UP (ref 0.5–1.3)
EGFR: 117 ML/MIN/1.73M2 — SIGNIFICANT CHANGE UP
EOSINOPHIL # BLD AUTO: 0.08 K/UL — SIGNIFICANT CHANGE UP (ref 0–0.5)
EOSINOPHIL NFR BLD AUTO: 2.2 % — SIGNIFICANT CHANGE UP (ref 0–6)
GLUCOSE SERPL-MCNC: 77 MG/DL — SIGNIFICANT CHANGE UP (ref 70–99)
HCT VFR BLD CALC: 32.9 % — LOW (ref 34.5–45)
HGB BLD-MCNC: 10.4 G/DL — LOW (ref 11.5–15.5)
IMM GRANULOCYTES NFR BLD AUTO: 0.3 % — SIGNIFICANT CHANGE UP (ref 0–0.9)
LYMPHOCYTES # BLD AUTO: 0.74 K/UL — LOW (ref 1–3.3)
LYMPHOCYTES # BLD AUTO: 20.8 % — SIGNIFICANT CHANGE UP (ref 13–44)
MCHC RBC-ENTMCNC: 28 PG — SIGNIFICANT CHANGE UP (ref 27–34)
MCHC RBC-ENTMCNC: 31.6 GM/DL — LOW (ref 32–36)
MCV RBC AUTO: 88.7 FL — SIGNIFICANT CHANGE UP (ref 80–100)
MONOCYTES # BLD AUTO: 0.43 K/UL — SIGNIFICANT CHANGE UP (ref 0–0.9)
MONOCYTES NFR BLD AUTO: 12.1 % — SIGNIFICANT CHANGE UP (ref 2–14)
NEUTROPHILS # BLD AUTO: 2.29 K/UL — SIGNIFICANT CHANGE UP (ref 1.8–7.4)
NEUTROPHILS NFR BLD AUTO: 64.3 % — SIGNIFICANT CHANGE UP (ref 43–77)
PLATELET # BLD AUTO: 298 K/UL — SIGNIFICANT CHANGE UP (ref 150–400)
POTASSIUM SERPL-MCNC: 3.3 MMOL/L — LOW (ref 3.5–5.3)
POTASSIUM SERPL-SCNC: 3.3 MMOL/L — LOW (ref 3.5–5.3)
PROT SERPL-MCNC: 6.4 G/DL — LOW (ref 6.6–8.7)
RBC # BLD: 3.71 M/UL — LOW (ref 3.8–5.2)
RBC # FLD: 15.5 % — HIGH (ref 10.3–14.5)
SODIUM SERPL-SCNC: 140 MMOL/L — SIGNIFICANT CHANGE UP (ref 135–145)
WBC # BLD: 3.56 K/UL — LOW (ref 3.8–10.5)
WBC # FLD AUTO: 3.56 K/UL — LOW (ref 3.8–10.5)

## 2022-11-27 PROCEDURE — 99285 EMERGENCY DEPT VISIT HI MDM: CPT

## 2022-11-27 PROCEDURE — 84702 CHORIONIC GONADOTROPIN TEST: CPT

## 2022-11-27 PROCEDURE — 74177 CT ABD & PELVIS W/CONTRAST: CPT | Mod: ME

## 2022-11-27 PROCEDURE — 96375 TX/PRO/DX INJ NEW DRUG ADDON: CPT

## 2022-11-27 PROCEDURE — 83690 ASSAY OF LIPASE: CPT

## 2022-11-27 PROCEDURE — 96376 TX/PRO/DX INJ SAME DRUG ADON: CPT

## 2022-11-27 PROCEDURE — G1004: CPT

## 2022-11-27 PROCEDURE — 96374 THER/PROPH/DIAG INJ IV PUSH: CPT

## 2022-11-27 PROCEDURE — 87040 BLOOD CULTURE FOR BACTERIA: CPT

## 2022-11-27 PROCEDURE — 99239 HOSP IP/OBS DSCHRG MGMT >30: CPT

## 2022-11-27 PROCEDURE — 80048 BASIC METABOLIC PNL TOTAL CA: CPT

## 2022-11-27 PROCEDURE — 85025 COMPLETE CBC W/AUTO DIFF WBC: CPT

## 2022-11-27 PROCEDURE — 80053 COMPREHEN METABOLIC PANEL: CPT

## 2022-11-27 PROCEDURE — 36415 COLL VENOUS BLD VENIPUNCTURE: CPT

## 2022-11-27 PROCEDURE — 83735 ASSAY OF MAGNESIUM: CPT

## 2022-11-27 PROCEDURE — 0225U NFCT DS DNA&RNA 21 SARSCOV2: CPT

## 2022-11-27 RX ORDER — METRONIDAZOLE 500 MG
1 TABLET ORAL
Qty: 15 | Refills: 0
Start: 2022-11-27 | End: 2022-12-01

## 2022-11-27 RX ORDER — CIPROFLOXACIN LACTATE 400MG/40ML
1 VIAL (ML) INTRAVENOUS
Qty: 10 | Refills: 0
Start: 2022-11-27 | End: 2022-12-01

## 2022-11-27 RX ORDER — FLUOXETINE HCL 10 MG
1 CAPSULE ORAL
Qty: 0 | Refills: 0 | DISCHARGE
Start: 2022-11-27

## 2022-11-27 RX ORDER — FLUOXETINE HCL 10 MG
1 CAPSULE ORAL
Qty: 0 | Refills: 0 | DISCHARGE

## 2022-11-27 RX ORDER — POTASSIUM CHLORIDE 20 MEQ
40 PACKET (EA) ORAL ONCE
Refills: 0 | Status: COMPLETED | OUTPATIENT
Start: 2022-11-27 | End: 2022-11-27

## 2022-11-27 RX ORDER — POTASSIUM CHLORIDE 20 MEQ
40 PACKET (EA) ORAL ONCE
Refills: 0 | Status: DISCONTINUED | OUTPATIENT
Start: 2022-11-27 | End: 2022-11-27

## 2022-11-27 RX ORDER — ONDANSETRON 8 MG/1
1 TABLET, FILM COATED ORAL
Qty: 0 | Refills: 0 | DISCHARGE

## 2022-11-27 RX ADMIN — HYDROMORPHONE HYDROCHLORIDE 2 MILLIGRAM(S): 2 INJECTION INTRAMUSCULAR; INTRAVENOUS; SUBCUTANEOUS at 06:14

## 2022-11-27 RX ADMIN — Medication 0.6 MILLIGRAM(S): at 06:21

## 2022-11-27 RX ADMIN — HYDROMORPHONE HYDROCHLORIDE 2 MILLIGRAM(S): 2 INJECTION INTRAMUSCULAR; INTRAVENOUS; SUBCUTANEOUS at 14:30

## 2022-11-27 RX ADMIN — HYDROMORPHONE HYDROCHLORIDE 2 MILLIGRAM(S): 2 INJECTION INTRAMUSCULAR; INTRAVENOUS; SUBCUTANEOUS at 13:59

## 2022-11-27 RX ADMIN — PIPERACILLIN AND TAZOBACTAM 25 GRAM(S): 4; .5 INJECTION, POWDER, LYOPHILIZED, FOR SOLUTION INTRAVENOUS at 06:19

## 2022-11-27 RX ADMIN — Medication 40 MILLIEQUIVALENT(S): at 11:48

## 2022-11-27 RX ADMIN — Medication 40 MILLIGRAM(S): at 09:59

## 2022-11-27 RX ADMIN — METHADONE HYDROCHLORIDE 7.5 MILLIGRAM(S): 40 TABLET ORAL at 13:57

## 2022-11-27 RX ADMIN — PIPERACILLIN AND TAZOBACTAM 25 GRAM(S): 4; .5 INJECTION, POWDER, LYOPHILIZED, FOR SOLUTION INTRAVENOUS at 13:57

## 2022-11-27 RX ADMIN — MEMANTINE HYDROCHLORIDE 10 MILLIGRAM(S): 10 TABLET ORAL at 06:20

## 2022-11-27 RX ADMIN — Medication 150 MILLIGRAM(S): at 06:21

## 2022-11-27 RX ADMIN — Medication 10 MILLIGRAM(S): at 06:21

## 2022-11-27 RX ADMIN — PANTOPRAZOLE SODIUM 40 MILLIGRAM(S): 20 TABLET, DELAYED RELEASE ORAL at 06:20

## 2022-11-27 RX ADMIN — HYDROMORPHONE HYDROCHLORIDE 2 MILLIGRAM(S): 2 INJECTION INTRAMUSCULAR; INTRAVENOUS; SUBCUTANEOUS at 09:59

## 2022-11-27 RX ADMIN — HYDROMORPHONE HYDROCHLORIDE 2 MILLIGRAM(S): 2 INJECTION INTRAMUSCULAR; INTRAVENOUS; SUBCUTANEOUS at 05:18

## 2022-11-27 RX ADMIN — HYDROMORPHONE HYDROCHLORIDE 2 MILLIGRAM(S): 2 INJECTION INTRAMUSCULAR; INTRAVENOUS; SUBCUTANEOUS at 10:29

## 2022-11-27 RX ADMIN — HYDROMORPHONE HYDROCHLORIDE 2 MILLIGRAM(S): 2 INJECTION INTRAMUSCULAR; INTRAVENOUS; SUBCUTANEOUS at 00:20

## 2022-11-27 RX ADMIN — Medication 40 MILLIEQUIVALENT(S): at 06:20

## 2022-11-27 RX ADMIN — METHADONE HYDROCHLORIDE 7.5 MILLIGRAM(S): 40 TABLET ORAL at 06:21

## 2022-11-27 NOTE — DIETITIAN NUTRITION RISK NOTIFICATION - ADDITIONAL COMMENTS/DIETITIAN RECOMMENDATIONS
1) Add low fiber/residue to diet order  2) Ensure BID  3) Rx MVI daily  4) Consider Megace appetite stimulant  5) Encourage HBV protein sources   6) Monitor weights daily for trend/accuracy

## 2022-11-27 NOTE — DISCHARGE NOTE PROVIDER - DETAILS OF MALNUTRITION DIAGNOSIS/DIAGNOSES
This patient has been assessed with a concern for Malnutrition and was treated during this hospitalization for the following Nutrition diagnosis/diagnoses:     -  11/27/2022: Moderate protein-calorie malnutrition

## 2022-11-27 NOTE — DIETITIAN INITIAL EVALUATION ADULT - ETIOLOGY
related to inability to meet sufficient protein-energy needs in setting of metastatic breast CA, N/V PTA 2/2 colitis

## 2022-11-27 NOTE — DIETITIAN INITIAL EVALUATION ADULT - PERTINENT LABORATORY DATA
11-27    140  |  103  |  5.3<L>  ----------------------------<  77  3.3<L>   |  26.0  |  0.65    Ca    8.0<L>      27 Nov 2022 05:00  Mg     1.9     11-26    TPro  6.4<L>  /  Alb  3.8  /  TBili  0.5  /  DBili  x   /  AST  22  /  ALT  26  /  AlkPhos  94  11-27

## 2022-11-27 NOTE — DIETITIAN INITIAL EVALUATION ADULT - PERTINENT MEDS FT
MEDICATIONS  (STANDING):  colchicine 0.6 milliGRAM(s) Oral every 12 hours  enoxaparin Injectable 40 milliGRAM(s) SubCutaneous every 24 hours  FLUoxetine 40 milliGRAM(s) Oral daily  memantine 10 milliGRAM(s) Oral two times a day  methadone    Tablet 7.5 milliGRAM(s) Oral every 8 hours  methylphenidate 10 milliGRAM(s) Oral two times a day  pantoprazole    Tablet 40 milliGRAM(s) Oral before breakfast  piperacillin/tazobactam IVPB.. 3.375 Gram(s) IV Intermittent every 8 hours  potassium chloride   Powder 40 milliEquivalent(s) Oral once  pregabalin 150 milliGRAM(s) Oral two times a day  senna 2 Tablet(s) Oral at bedtime    MEDICATIONS  (PRN):  acetaminophen     Tablet .. 650 milliGRAM(s) Oral every 6 hours PRN Temp greater or equal to 38C (100.4F), Mild Pain (1 - 3)  aluminum hydroxide/magnesium hydroxide/simethicone Suspension 30 milliLiter(s) Oral every 4 hours PRN Dyspepsia  HYDROmorphone  Injectable 2 milliGRAM(s) IV Push every 4 hours PRN Severe Pain (7 - 10)  LORazepam     Tablet 0.5 milliGRAM(s) Oral two times a day PRN Agitation  melatonin 3 milliGRAM(s) Oral at bedtime PRN Insomnia  ondansetron Injectable 4 milliGRAM(s) IV Push every 8 hours PRN Nausea and/or Vomiting

## 2022-11-27 NOTE — DISCHARGE NOTE PROVIDER - NSDCMRMEDTOKEN_GEN_ALL_CORE_FT
ciprofloxacin 500 mg oral tablet: 1 tab(s) orally 2 times a day   colchicine 0.6 mg oral tablet: 1 tab(s) orally every 12 hours  Compazine 5 mg oral tablet: 1 tab(s) orally every 6 hours, As Needed  diazePAM 2 mg oral tablet: 1 tab(s) orally 2 times a day, As Needed  Dilaudid 4 mg oral tablet: 1 tab(s) orally every 4 hours, As Needed  FLUoxetine 40 mg oral capsule: 1 cap(s) orally once a day  Lyrica 150 mg oral capsule: 1 cap(s) orally 2 times a day hold if lethargic and do not take it with narcotics at the same time   methadone 5 mg oral tablet:  1.5 tablets at 6AM, 1.5 tablets at 2PM, 1.5 tablets at 10PM  methylphenidate 10 mg oral tablet: 1 tab(s) orally 2 times a day  metroNIDAZOLE 500 mg oral tablet: 1 tab(s) orally 3 times a day   Namenda 10 mg oral tablet: 1 tab(s) orally 2 times a day  pantoprazole 40 mg oral delayed release tablet: 1 tab(s) orally once a day (before a meal)  senna leaf extract oral tablet: 2 tab(s) orally once a day (at bedtime)

## 2022-11-27 NOTE — DISCHARGE NOTE PROVIDER - HOSPITAL COURSE
38 y/o female with PMH of metastatic breast cancer with mets to brain, lung, liver and bone on active chemo s/p completion of cranial radiation, hx malignant pleural effusion (s/p chest tube 12/23/21), pericardial effusion w/tamponade s/p pericardiocentesis (12/28/21), pathologic compression fx, anxiety came to the ED complaining of nausea and vomiting x 3 days. Patient said she has not been able to tolerate any food or drink, noted diffuse abdominal pain. No diarrhea, fever, chills, chest pain, shortness of breath, palpitation, dysuria.      CT abdomen showed colitis on presentation. Patient without sepsis but started on zosyn IV (she was not able to take PO). Patient felt improvement and was advanced to fluid diet the following day. Today able to tolerate regular food. She was additionally found to have positive enterovirus on RVP. Blood cultures from admission were negative. Patient at this time will continue PO antibiotics on discharge. Will have her follow up with providers on discharge

## 2022-11-27 NOTE — DIETITIAN INITIAL EVALUATION ADULT - WEIGHT FOR BMI (KG)
Order in for opht   
Referral faxed to MD office. Called pt and LVMTCB. Will send portal msg as well     
66.7

## 2022-11-27 NOTE — DIETITIAN INITIAL EVALUATION ADULT - ORAL INTAKE PTA/DIET HISTORY
Pt reports have decreased appetite/PO intake PTA 2/2 N/V and inability to tolerate PO. Pt states weight ~1-2 months ago 155lbs and confirms unintentional 8lb weight loss. Pt has tried Marinol in the past but didn't like the effect, Megace was successful in promoting appetite/PO intake though stopped taking. Pt tolerating FLD at this time, agreeable to trial Ensure. Discussed importance of protein intake, small/frequent meals and adding calories to meals. Pt receptive and understanding, RD to remain available.

## 2022-11-27 NOTE — DISCHARGE NOTE PROVIDER - ATTENDING DISCHARGE PHYSICAL EXAMINATION:
PHYSICAL EXAM:    General: in no acute distress  Eyes: PERRLA, EOMI; conjunctiva and sclera clear  Head: Normocephalic; atraumatic  ENMT: No nasal discharge; airway clear  Neck: Supple; non tender; no masses  Respiratory: No wheezes, rales or rhonchi  Cardiovascular: Regular rate and rhythm. S1 and S2 Normal; No murmurs, gallops or rubs  Gastrointestinal: Soft non-tender non-distended; Normal bowel sounds  Genitourinary: No costovertebral angle tenderness  Extremities: Normal range of motion, No clubbing, cyanosis or edema  Vascular: Peripheral pulses palpable 2+ bilaterally  Neurological: Alert and oriented x4  Skin: Warm and dry. No acute rash  Psychiatric: Cooperative and appropriate

## 2022-11-27 NOTE — DIETITIAN INITIAL EVALUATION ADULT - OTHER INFO
36 y/o female with PMH of metastatic breast cancer with mets to brain, lung, liver and bone on active chemo s/p completion of cranial radiation, hx malignant pleural effusion (s/p chest tube 12/23/21), pericardial effusion w/tamponade s/p pericardiocentesis (12/28/21), pathologic compression fx, anxiety came to the ED complaining of nausea and vomiting x 3 days. Patient said she has not been able to tolerate any food or drink, noted diffuse abdominal pain.

## 2022-11-27 NOTE — DISCHARGE NOTE PROVIDER - CARE PROVIDER_API CALL
Morenita Sandoval)  HematologyOncology; Internal Medicine; Medical Oncology  [address]  [place] [address]  Phone: [phone]  Fax: [phone]  Follow Up Time:

## 2022-11-27 NOTE — DISCHARGE NOTE NURSING/CASE MANAGEMENT/SOCIAL WORK - PATIENT PORTAL LINK FT
You can access the FollowMyHealth Patient Portal offered by [facility] by registering at the following website: [email or web address] By joining [facility]’s FollowMyHealth portal, you will also be able to view your health information using other applications (apps) compatible with our system.

## 2022-11-27 NOTE — DISCHARGE NOTE NURSING/CASE MANAGEMENT/SOCIAL WORK - NSDCPEFALRISK_GEN_ALL_CORE
For information on Fall & Injury Prevention, visit: [email or web address] OR  [email or web address] OR  https://www.cdc.gov/steadi/patient.html responds to verbal commands/alert and oriented x 3

## 2022-12-02 ENCOUNTER — RESULT REVIEW (OUTPATIENT)
Age: 37
End: 2022-12-02

## 2022-12-11 NOTE — ED PROVIDER NOTE - CONSTITUTIONAL NEGATIVE STATEMENT, MLM
Nael Carey - [facility]  Endocrinology  Diabetes Consult Note    Consult Requested by: Alise Hart MD   Reason for admit: <principal problem not specified>    HISTORY OF PRESENT ILLNESS:  Reason for Consult: Management of T2DM, Hyperglycemia       Diabetes diagnosis year: 2010     Home Diabetes Medications:  Ozempic 0.25 mg weekly, Metformin unsure dose     How often checking glucose at home?  Once daily in morning  BG readings on regimen: low 100s  Hypoglycemia on the regimen?  No  Missed doses on regimen?  n/a     Diabetes Complications include:     None      Complicating diabetes co morbidities:   pAfib, HTN, active cancer         HPI:   Patient is a 71 y.o. male with hypertension, hypothyroidism, diabetes type 2, GERD , laryngeal cancer and malignant neoplasm of base of tongue who underwent a total glossectomy pharyngectomy with lateral thigh reconstruction and skin graft on 11/09/2022 at [facility] .  He  presented to the ED on account of shortness of breath and heavy mucus production.  Admitted for bacteremia and fungemia, port removed and ID recommending IV antibiotics at home. PICC placed 12/1. Evidence of fistula on L neck with drainage, managed with BID packing changes.  Endocrine consulted for bg management.      Interval HPI:   Overnight events: No acute events overnight. Patient in room 1055/1055 A. Blood glucose variable. BG above goal on current insulin regimen (SSI ). Steroid use- None .      Renal function- Normal   Vasopressors-  None     No diet orders on file     Eating:   <25%  Nausea: No  Hypoglycemia and intervention: No  Fever: No  TPN and/or TF: Yes  If yes, type of TF/TPN and rate: 55 mL/hr    PMH, PSH, FH, SH updated and reviewed       Review of Systems   Constitutional:  Negative for chills and fever.   Respiratory:  Negative for cough and shortness of breath.    Cardiovascular:  Negative for chest pain.   Gastrointestinal:  Negative for nausea and vomiting.   All other systems reviewed and are  negative.    Current Medications and/or Treatments Impacting Glycemic Control  Immunotherapy:    Immunosuppressants       None          Steroids:   Hormones (From admission, onward)      Start     Stop Route Frequency Ordered    12/10/22 1316  melatonin tablet 6 mg         -- Oral Nightly PRN 12/10/22 1216          Pressors:    Autonomic Drugs (From admission, onward)      None          Hyperglycemia/Diabetes Medications:   Antihyperglycemics (From admission, onward)      Start     Stop Route Frequency Ordered    11/1951  insulin aspart U-100 pen 1-10 Units         -- SubQ Every 6 hours PRN 11/23/22 1851             PHYSICAL EXAMINATION:  Vitals:    12/11/22 0809   BP:    Pulse: 68   Resp: 18   Temp:      Body mass index is 22.11 kg/m².    Physical Exam  Constitutional:       General: He is not in acute distress.     Appearance: Normal appearance. He is not ill-appearing.   HENT:      Head: Normocephalic and atraumatic.      Right Ear: External ear normal.      Left Ear: External ear normal.      Nose: Nose normal.   Cardiovascular:      Rate and Rhythm: Normal rate and regular rhythm.      Heart sounds: No murmur heard.  Pulmonary:      Effort: Pulmonary effort is normal. No respiratory distress.      Comments: trach  Abdominal:      General: Bowel sounds are normal. There is no distension.      Tenderness: There is no abdominal tenderness.      Comments: Peg in place   Musculoskeletal:         General: No swelling.      Right lower leg: No edema.      Left lower leg: No edema.   Skin:     Findings: No erythema.   Neurological:      Mental Status: He is alert.         Labs Reviewed and Include   Recent Labs   Lab 12/11/22  0536   *   CALCIUM 7.6*   ALBUMIN 2.5*   PROT 6.0   *   K 4.2   CO2 22*      BUN 16   CREATININE 0.8   ALKPHOS 742*   ALT 52*   AST 27   BILITOT 1.2*     Lab Results   Component Value Date    WBC 3.75 (L) 12/11/2022    HGB 8.9 (L) 12/11/2022    HCT 27.5 (L) 12/11/2022     MCV 94 12/11/2022     12/11/2022     No results for input(s): TSH, FREET4 in the last 168 hours.  Lab Results   Component Value Date    HGBA1C 5.8 11/22/2022       Nutritional status:   Body mass index is 22.11 kg/m².  Lab Results   Component Value Date    ALBUMIN 2.5 (L) 12/11/2022    ALBUMIN 2.4 (L) 12/10/2022    ALBUMIN 2.3 (L) 12/09/2022     No results found for: PREALBUMIN    Estimated Creatinine Clearance: 74.4 mL/min (based on SCr of 0.8 mg/dL).    Accu-Checks  Recent Labs     12/09/22  1204 12/09/22  1625 12/09/22  1735 12/10/22  0129 12/10/22  0532 12/10/22  0554 12/10/22  1203 12/10/22  1725 12/10/22  2345 12/11/22  0612   POCTGLUCOSE 194* 220* 262* 269* 222* 229* 205* 270* 249* 271*        ASSESSMENT and PLAN    Type 2 diabetes mellitus, without long-term current use of insulin  BG goal: 140-180     Sugars elevated in 200s.  Pt on TF.  W/ plans to go home on TF possibly tomorrow.  Pt reports previously did feeds 9a-9p at home.  He is against going home on any type of scheduled insulin, but will consider SSI.  Will adjust insulin here to q4hr to cover TF and start on 2 units q4.     - Start Novolog 2 units q4 hr while pt on TF. Please hold if TF held or if BG under 100.  - Adjust Novolog Moderate dose correction with ISF 25 starting at 150 to be done q4h  - POCT Glucose every 4 hours  - Hypoglycemia protocol in place      ** Please notify Endocrine for any change and/or advance in diet**  ** Please call Endocrine for any BG related issues **     Discharge Planning:   TBD. Please notify endocrinology prior to discharge.   Anticipate pt will go home on previous home regimen of Ozempic and Metformin with addition of sliding scale insulin q4 as needed.  Will need close f/u w/ home provider or in discharge clinic.          Laryngeal cancer  Managed by primary team  Will optimize BG      Fungemia  On antifungals  Infection can raise blood sugars      Bacteremia  On Cefepime in dextrose  Infection and abx  can increase bg          Plan discussed with patient, family, and RN at bedside.     Melvin Garzon PA-C  Endocrinology  Nael ZELAYA   no fever and no chills.

## 2023-06-19 NOTE — ED PROVIDER NOTE - CARE PLAN
The [address] [facility],[address], [address] [facility], [address] [place], [place]          Date:  2023    Patient Name:  Doug Nash    :  1940  MRN: [id number]  Restrictions/Precautions:    Medical/Treatment Diagnosis Information:  Diagnosis: Left rotator cuff tear (M75.102)    Treatment Diagnosis: Left shoulder pain (E69.960)  Insurance/Certification information:   [place]   Physician Information:   Dr. Shahab Duncan  Has the plan of care been signed (Y/N):        [x]  Yes  []  No     Date of Patient follow up with Physician: 23      Is this a Progress Report:     []  Yes  [x]  No        If Yes:  Date Range for reporting period:  Beginnin23  Ending    Progress report will be due (10 Rx or 30 days whichever is less):        Recertification will be due (POC Duration  / 90 days whichever is less):       Visit # Insurance Allowable Auth Required   In-person 2 4 visits through  []  Yes []  No    Telehealth   []  Yes []  No    Total            Functional Scale: UEFI 70/80 (13%)    Date assessed:  23      Therapy Diagnosis/Practice Pattern: D      Number of Comorbidities:  []0     []1-2    [x]3+    Latex Allergy:  [x]NO      []YES  Preferred Language for Healthcare:   [x]English       []other:      Pain level:  0-2/10     SUBJECTIVE:  Patient reports that he does not have much pain in his shoulder. Belt stretch is somewhat painful.      OBJECTIVE:   Observation:   Test measurements:  NT this date    RESTRICTIONS/PRECAUTIONS: hx of cervical fusion (10 years ago)    Exercises/Interventions:     Therapeutic Ex (03348) and NMR re-education (71915)  Sets/sec Reps Notes/CUES   Supine cane flexion  5\" 10x    Sidelying ER 3\" 2 x 10 Needs cueing   SL abd 1 5 Stopped due to pain   Scap squeezes 3\" 10x Reviewed form for HEP   No $ 3\" 15x Cueing for posture and to avoid shrug            Wall isos abd and flex 10\" 10x ea Submax; cueing for posture   GTB rows 3\" 2
Principal Discharge DX:	[place]

## 2023-09-08 NOTE — ED ADULT NURSE NOTE - CAS DISCH TRANSFER METHOD
Samples left upfront in [place]. I spoke with the family about the [address] [facility]. She will discuss with the daughter to see if this is something they want to pursue.   Renetta Neff Private car

## 2023-11-16 ENCOUNTER — INPATIENT (INPATIENT)
Facility: HOSPITAL | Age: 38
LOS: 4 days | Discharge: HOME | DRG: 394 | End: 2023-11-21
Attending: STUDENT IN AN ORGANIZED HEALTH CARE EDUCATION/TRAINING PROGRAM | Admitting: STUDENT IN AN ORGANIZED HEALTH CARE EDUCATION/TRAINING PROGRAM
Payer: MEDICARE

## 2023-11-16 VITALS
HEART RATE: 88 BPM | SYSTOLIC BLOOD PRESSURE: 114 MMHG | TEMPERATURE: 98 F | WEIGHT: 135.8 LBS | DIASTOLIC BLOOD PRESSURE: 72 MMHG | RESPIRATION RATE: 20 BRPM | OXYGEN SATURATION: 97 %

## 2023-11-16 DIAGNOSIS — Z90.89 ACQUIRED ABSENCE OF OTHER ORGANS: Chronic | ICD-10-CM

## 2023-11-16 DIAGNOSIS — Z98.890 OTHER SPECIFIED POSTPROCEDURAL STATES: Chronic | ICD-10-CM

## 2023-11-16 PROCEDURE — 99285 EMERGENCY DEPT VISIT HI MDM: CPT

## 2023-11-16 RX ORDER — HYDROMORPHONE HYDROCHLORIDE 2 MG/ML
1 INJECTION INTRAMUSCULAR; INTRAVENOUS; SUBCUTANEOUS ONCE
Refills: 0 | Status: DISCONTINUED | OUTPATIENT
Start: 2023-11-16 | End: 2023-11-16

## 2023-11-17 DIAGNOSIS — D64.9 ANEMIA, UNSPECIFIED: ICD-10-CM

## 2023-11-17 LAB
ALBUMIN SERPL ELPH-MCNC: 3.3 G/DL — SIGNIFICANT CHANGE UP (ref 3.3–5.2)
ALBUMIN SERPL ELPH-MCNC: 3.3 G/DL — SIGNIFICANT CHANGE UP (ref 3.3–5.2)
ALP SERPL-CCNC: 101 U/L — SIGNIFICANT CHANGE UP (ref 40–120)
ALP SERPL-CCNC: 101 U/L — SIGNIFICANT CHANGE UP (ref 40–120)
ALT FLD-CCNC: 20 U/L — SIGNIFICANT CHANGE UP
ALT FLD-CCNC: 20 U/L — SIGNIFICANT CHANGE UP
ANION GAP SERPL CALC-SCNC: 10 MMOL/L — SIGNIFICANT CHANGE UP (ref 5–17)
ANION GAP SERPL CALC-SCNC: 10 MMOL/L — SIGNIFICANT CHANGE UP (ref 5–17)
ANISOCYTOSIS BLD QL: SLIGHT — SIGNIFICANT CHANGE UP
ANISOCYTOSIS BLD QL: SLIGHT — SIGNIFICANT CHANGE UP
APTT BLD: 196.7 SEC — CRITICAL HIGH (ref 24.5–35.6)
APTT BLD: 196.7 SEC — CRITICAL HIGH (ref 24.5–35.6)
APTT BLD: 40.2 SEC — HIGH (ref 24.5–35.6)
APTT BLD: 40.2 SEC — HIGH (ref 24.5–35.6)
AST SERPL-CCNC: 29 U/L — SIGNIFICANT CHANGE UP
AST SERPL-CCNC: 29 U/L — SIGNIFICANT CHANGE UP
BASOPHILS # BLD AUTO: 0 K/UL — SIGNIFICANT CHANGE UP (ref 0–0.2)
BASOPHILS NFR BLD AUTO: 0 % — SIGNIFICANT CHANGE UP (ref 0–2)
BILIRUB SERPL-MCNC: 0.7 MG/DL — SIGNIFICANT CHANGE UP (ref 0.4–2)
BILIRUB SERPL-MCNC: 0.7 MG/DL — SIGNIFICANT CHANGE UP (ref 0.4–2)
BLD GP AB SCN SERPL QL: SIGNIFICANT CHANGE UP
BLD GP AB SCN SERPL QL: SIGNIFICANT CHANGE UP
BUN SERPL-MCNC: 7.5 MG/DL — LOW (ref 8–20)
BUN SERPL-MCNC: 7.5 MG/DL — LOW (ref 8–20)
CALCIUM SERPL-MCNC: 7.8 MG/DL — LOW (ref 8.4–10.5)
CALCIUM SERPL-MCNC: 7.8 MG/DL — LOW (ref 8.4–10.5)
CHLORIDE SERPL-SCNC: 110 MMOL/L — HIGH (ref 96–108)
CHLORIDE SERPL-SCNC: 110 MMOL/L — HIGH (ref 96–108)
CO2 SERPL-SCNC: 24 MMOL/L — SIGNIFICANT CHANGE UP (ref 22–29)
CO2 SERPL-SCNC: 24 MMOL/L — SIGNIFICANT CHANGE UP (ref 22–29)
CREAT SERPL-MCNC: 0.43 MG/DL — LOW (ref 0.5–1.3)
CREAT SERPL-MCNC: 0.43 MG/DL — LOW (ref 0.5–1.3)
DACRYOCYTES BLD QL SMEAR: SLIGHT — SIGNIFICANT CHANGE UP
DACRYOCYTES BLD QL SMEAR: SLIGHT — SIGNIFICANT CHANGE UP
EGFR: 128 ML/MIN/1.73M2 — SIGNIFICANT CHANGE UP
EGFR: 128 ML/MIN/1.73M2 — SIGNIFICANT CHANGE UP
EOSINOPHIL # BLD AUTO: 0.07 K/UL — SIGNIFICANT CHANGE UP (ref 0–0.5)
EOSINOPHIL NFR BLD AUTO: 1.7 % — SIGNIFICANT CHANGE UP (ref 0–6)
EOSINOPHIL NFR BLD AUTO: 1.7 % — SIGNIFICANT CHANGE UP (ref 0–6)
EOSINOPHIL NFR BLD AUTO: 2.2 % — SIGNIFICANT CHANGE UP (ref 0–6)
EOSINOPHIL NFR BLD AUTO: 2.2 % — SIGNIFICANT CHANGE UP (ref 0–6)
GIANT PLATELETS BLD QL SMEAR: PRESENT — SIGNIFICANT CHANGE UP
GIANT PLATELETS BLD QL SMEAR: PRESENT — SIGNIFICANT CHANGE UP
GLUCOSE SERPL-MCNC: 81 MG/DL — SIGNIFICANT CHANGE UP (ref 70–99)
GLUCOSE SERPL-MCNC: 81 MG/DL — SIGNIFICANT CHANGE UP (ref 70–99)
HCT VFR BLD CALC: 20.1 % — CRITICAL LOW (ref 34.5–45)
HCT VFR BLD CALC: 20.1 % — CRITICAL LOW (ref 34.5–45)
HCT VFR BLD CALC: 28.2 % — LOW (ref 34.5–45)
HCT VFR BLD CALC: 28.2 % — LOW (ref 34.5–45)
HGB BLD-MCNC: 6.3 G/DL — CRITICAL LOW (ref 11.5–15.5)
HGB BLD-MCNC: 6.3 G/DL — CRITICAL LOW (ref 11.5–15.5)
HGB BLD-MCNC: 9.2 G/DL — LOW (ref 11.5–15.5)
HGB BLD-MCNC: 9.2 G/DL — LOW (ref 11.5–15.5)
IMM GRANULOCYTES NFR BLD AUTO: 0.9 % — SIGNIFICANT CHANGE UP (ref 0–0.9)
IMM GRANULOCYTES NFR BLD AUTO: 0.9 % — SIGNIFICANT CHANGE UP (ref 0–0.9)
INR BLD: 1.15 RATIO — SIGNIFICANT CHANGE UP (ref 0.85–1.18)
INR BLD: 1.15 RATIO — SIGNIFICANT CHANGE UP (ref 0.85–1.18)
INR BLD: 1.16 RATIO — SIGNIFICANT CHANGE UP (ref 0.85–1.18)
INR BLD: 1.16 RATIO — SIGNIFICANT CHANGE UP (ref 0.85–1.18)
LYMPHOCYTES # BLD AUTO: 0.24 K/UL — LOW (ref 1–3.3)
LYMPHOCYTES # BLD AUTO: 0.24 K/UL — LOW (ref 1–3.3)
LYMPHOCYTES # BLD AUTO: 0.61 K/UL — LOW (ref 1–3.3)
LYMPHOCYTES # BLD AUTO: 0.61 K/UL — LOW (ref 1–3.3)
LYMPHOCYTES # BLD AUTO: 18.9 % — SIGNIFICANT CHANGE UP (ref 13–44)
LYMPHOCYTES # BLD AUTO: 18.9 % — SIGNIFICANT CHANGE UP (ref 13–44)
LYMPHOCYTES # BLD AUTO: 6.1 % — LOW (ref 13–44)
LYMPHOCYTES # BLD AUTO: 6.1 % — LOW (ref 13–44)
MANUAL SMEAR VERIFICATION: SIGNIFICANT CHANGE UP
MANUAL SMEAR VERIFICATION: SIGNIFICANT CHANGE UP
MCHC RBC-ENTMCNC: 25.5 PG — LOW (ref 27–34)
MCHC RBC-ENTMCNC: 25.5 PG — LOW (ref 27–34)
MCHC RBC-ENTMCNC: 26.1 PG — LOW (ref 27–34)
MCHC RBC-ENTMCNC: 26.1 PG — LOW (ref 27–34)
MCHC RBC-ENTMCNC: 31.3 GM/DL — LOW (ref 32–36)
MCHC RBC-ENTMCNC: 31.3 GM/DL — LOW (ref 32–36)
MCHC RBC-ENTMCNC: 32.6 GM/DL — SIGNIFICANT CHANGE UP (ref 32–36)
MCHC RBC-ENTMCNC: 32.6 GM/DL — SIGNIFICANT CHANGE UP (ref 32–36)
MCV RBC AUTO: 79.9 FL — LOW (ref 80–100)
MCV RBC AUTO: 79.9 FL — LOW (ref 80–100)
MCV RBC AUTO: 81.4 FL — SIGNIFICANT CHANGE UP (ref 80–100)
MCV RBC AUTO: 81.4 FL — SIGNIFICANT CHANGE UP (ref 80–100)
MICROCYTES BLD QL: SLIGHT — SIGNIFICANT CHANGE UP
MICROCYTES BLD QL: SLIGHT — SIGNIFICANT CHANGE UP
MONOCYTES # BLD AUTO: 0.07 K/UL — SIGNIFICANT CHANGE UP (ref 0–0.9)
MONOCYTES # BLD AUTO: 0.07 K/UL — SIGNIFICANT CHANGE UP (ref 0–0.9)
MONOCYTES # BLD AUTO: 0.41 K/UL — SIGNIFICANT CHANGE UP (ref 0–0.9)
MONOCYTES # BLD AUTO: 0.41 K/UL — SIGNIFICANT CHANGE UP (ref 0–0.9)
MONOCYTES NFR BLD AUTO: 1.7 % — LOW (ref 2–14)
MONOCYTES NFR BLD AUTO: 1.7 % — LOW (ref 2–14)
MONOCYTES NFR BLD AUTO: 12.7 % — SIGNIFICANT CHANGE UP (ref 2–14)
MONOCYTES NFR BLD AUTO: 12.7 % — SIGNIFICANT CHANGE UP (ref 2–14)
NEUTROPHILS # BLD AUTO: 2.1 K/UL — SIGNIFICANT CHANGE UP (ref 1.8–7.4)
NEUTROPHILS # BLD AUTO: 2.1 K/UL — SIGNIFICANT CHANGE UP (ref 1.8–7.4)
NEUTROPHILS # BLD AUTO: 3.53 K/UL — SIGNIFICANT CHANGE UP (ref 1.8–7.4)
NEUTROPHILS # BLD AUTO: 3.53 K/UL — SIGNIFICANT CHANGE UP (ref 1.8–7.4)
NEUTROPHILS NFR BLD AUTO: 65.3 % — SIGNIFICANT CHANGE UP (ref 43–77)
NEUTROPHILS NFR BLD AUTO: 65.3 % — SIGNIFICANT CHANGE UP (ref 43–77)
NEUTROPHILS NFR BLD AUTO: 89.6 % — HIGH (ref 43–77)
NEUTROPHILS NFR BLD AUTO: 89.6 % — HIGH (ref 43–77)
NEUTS BAND # BLD: 0.9 % — SIGNIFICANT CHANGE UP (ref 0–8)
NEUTS BAND # BLD: 0.9 % — SIGNIFICANT CHANGE UP (ref 0–8)
NRBC # BLD: 1 /100 — HIGH (ref 0–0)
NRBC # BLD: 1 /100 — HIGH (ref 0–0)
OVALOCYTES BLD QL SMEAR: SLIGHT — SIGNIFICANT CHANGE UP
OVALOCYTES BLD QL SMEAR: SLIGHT — SIGNIFICANT CHANGE UP
PLAT MORPH BLD: NORMAL — SIGNIFICANT CHANGE UP
PLAT MORPH BLD: NORMAL — SIGNIFICANT CHANGE UP
PLATELET # BLD AUTO: 261 K/UL — SIGNIFICANT CHANGE UP (ref 150–400)
PLATELET # BLD AUTO: 261 K/UL — SIGNIFICANT CHANGE UP (ref 150–400)
PLATELET # BLD AUTO: 294 K/UL — SIGNIFICANT CHANGE UP (ref 150–400)
PLATELET # BLD AUTO: 294 K/UL — SIGNIFICANT CHANGE UP (ref 150–400)
POIKILOCYTOSIS BLD QL AUTO: SLIGHT — SIGNIFICANT CHANGE UP
POIKILOCYTOSIS BLD QL AUTO: SLIGHT — SIGNIFICANT CHANGE UP
POLYCHROMASIA BLD QL SMEAR: SLIGHT — SIGNIFICANT CHANGE UP
POLYCHROMASIA BLD QL SMEAR: SLIGHT — SIGNIFICANT CHANGE UP
POTASSIUM SERPL-MCNC: 3.2 MMOL/L — LOW (ref 3.5–5.3)
POTASSIUM SERPL-MCNC: 3.2 MMOL/L — LOW (ref 3.5–5.3)
POTASSIUM SERPL-SCNC: 3.2 MMOL/L — LOW (ref 3.5–5.3)
POTASSIUM SERPL-SCNC: 3.2 MMOL/L — LOW (ref 3.5–5.3)
PROT SERPL-MCNC: 5.4 G/DL — LOW (ref 6.6–8.7)
PROT SERPL-MCNC: 5.4 G/DL — LOW (ref 6.6–8.7)
PROTHROM AB SERPL-ACNC: 12.7 SEC — SIGNIFICANT CHANGE UP (ref 9.5–13)
PROTHROM AB SERPL-ACNC: 12.7 SEC — SIGNIFICANT CHANGE UP (ref 9.5–13)
PROTHROM AB SERPL-ACNC: 12.8 SEC — SIGNIFICANT CHANGE UP (ref 9.5–13)
PROTHROM AB SERPL-ACNC: 12.8 SEC — SIGNIFICANT CHANGE UP (ref 9.5–13)
RBC # BLD: 2.47 M/UL — LOW (ref 3.8–5.2)
RBC # BLD: 2.47 M/UL — LOW (ref 3.8–5.2)
RBC # BLD: 3.53 M/UL — LOW (ref 3.8–5.2)
RBC # BLD: 3.53 M/UL — LOW (ref 3.8–5.2)
RBC # FLD: 19.2 % — HIGH (ref 10.3–14.5)
RBC # FLD: 19.2 % — HIGH (ref 10.3–14.5)
RBC # FLD: 20.8 % — HIGH (ref 10.3–14.5)
RBC # FLD: 20.8 % — HIGH (ref 10.3–14.5)
RBC BLD AUTO: ABNORMAL
RBC BLD AUTO: ABNORMAL
SCHISTOCYTES BLD QL AUTO: SLIGHT — SIGNIFICANT CHANGE UP
SCHISTOCYTES BLD QL AUTO: SLIGHT — SIGNIFICANT CHANGE UP
SODIUM SERPL-SCNC: 144 MMOL/L — SIGNIFICANT CHANGE UP (ref 135–145)
SODIUM SERPL-SCNC: 144 MMOL/L — SIGNIFICANT CHANGE UP (ref 135–145)
WBC # BLD: 3.22 K/UL — LOW (ref 3.8–10.5)
WBC # BLD: 3.22 K/UL — LOW (ref 3.8–10.5)
WBC # BLD: 3.9 K/UL — SIGNIFICANT CHANGE UP (ref 3.8–10.5)
WBC # BLD: 3.9 K/UL — SIGNIFICANT CHANGE UP (ref 3.8–10.5)
WBC # FLD AUTO: 3.22 K/UL — LOW (ref 3.8–10.5)
WBC # FLD AUTO: 3.22 K/UL — LOW (ref 3.8–10.5)
WBC # FLD AUTO: 3.9 K/UL — SIGNIFICANT CHANGE UP (ref 3.8–10.5)
WBC # FLD AUTO: 3.9 K/UL — SIGNIFICANT CHANGE UP (ref 3.8–10.5)

## 2023-11-17 PROCEDURE — 74178 CT ABD&PLV WO CNTR FLWD CNTR: CPT | Mod: 26,MA

## 2023-11-17 PROCEDURE — 99223 1ST HOSP IP/OBS HIGH 75: CPT

## 2023-11-17 RX ORDER — LANOLIN ALCOHOL/MO/W.PET/CERES
3 CREAM (GRAM) TOPICAL AT BEDTIME
Refills: 0 | Status: DISCONTINUED | OUTPATIENT
Start: 2023-11-17 | End: 2023-11-21

## 2023-11-17 RX ORDER — METHADONE HYDROCHLORIDE 40 MG/1
10 TABLET ORAL
Refills: 0 | Status: DISCONTINUED | OUTPATIENT
Start: 2023-11-17 | End: 2023-11-21

## 2023-11-17 RX ORDER — PIPERACILLIN AND TAZOBACTAM 4; .5 G/20ML; G/20ML
3.38 INJECTION, POWDER, LYOPHILIZED, FOR SOLUTION INTRAVENOUS ONCE
Refills: 0 | Status: COMPLETED | OUTPATIENT
Start: 2023-11-17 | End: 2023-11-17

## 2023-11-17 RX ORDER — ONDANSETRON 8 MG/1
4 TABLET, FILM COATED ORAL ONCE
Refills: 0 | Status: COMPLETED | OUTPATIENT
Start: 2023-11-17 | End: 2023-11-17

## 2023-11-17 RX ORDER — HYDROMORPHONE HYDROCHLORIDE 2 MG/ML
2 INJECTION INTRAMUSCULAR; INTRAVENOUS; SUBCUTANEOUS ONCE
Refills: 0 | Status: DISCONTINUED | OUTPATIENT
Start: 2023-11-17 | End: 2023-11-17

## 2023-11-17 RX ORDER — MEMANTINE HYDROCHLORIDE 10 MG/1
10 TABLET ORAL
Refills: 0 | Status: DISCONTINUED | OUTPATIENT
Start: 2023-11-17 | End: 2023-11-21

## 2023-11-17 RX ORDER — ACETAMINOPHEN 500 MG
650 TABLET ORAL EVERY 6 HOURS
Refills: 0 | Status: DISCONTINUED | OUTPATIENT
Start: 2023-11-17 | End: 2023-11-21

## 2023-11-17 RX ORDER — MINERAL OIL
133 OIL (ML) MISCELLANEOUS
Refills: 0 | Status: COMPLETED | OUTPATIENT
Start: 2023-11-17 | End: 2023-11-18

## 2023-11-17 RX ORDER — HYDROMORPHONE HYDROCHLORIDE 2 MG/ML
4 INJECTION INTRAMUSCULAR; INTRAVENOUS; SUBCUTANEOUS EVERY 8 HOURS
Refills: 0 | Status: DISCONTINUED | OUTPATIENT
Start: 2023-11-17 | End: 2023-11-21

## 2023-11-17 RX ORDER — INFLUENZA VIRUS VACCINE 15; 15; 15; 15 UG/.5ML; UG/.5ML; UG/.5ML; UG/.5ML
0.5 SUSPENSION INTRAMUSCULAR ONCE
Refills: 0 | Status: DISCONTINUED | OUTPATIENT
Start: 2023-11-17 | End: 2023-11-21

## 2023-11-17 RX ORDER — POLYETHYLENE GLYCOL 3350 17 G/17G
17 POWDER, FOR SOLUTION ORAL
Refills: 0 | Status: DISCONTINUED | OUTPATIENT
Start: 2023-11-17 | End: 2023-11-18

## 2023-11-17 RX ORDER — LIDOCAINE 4 G/100G
1 CREAM TOPICAL ONCE
Refills: 0 | Status: COMPLETED | OUTPATIENT
Start: 2023-11-17 | End: 2023-11-17

## 2023-11-17 RX ORDER — METHOCARBAMOL 500 MG/1
1500 TABLET, FILM COATED ORAL ONCE
Refills: 0 | Status: COMPLETED | OUTPATIENT
Start: 2023-11-17 | End: 2023-11-17

## 2023-11-17 RX ORDER — PROCHLORPERAZINE MALEATE 5 MG
1 TABLET ORAL
Qty: 0 | Refills: 0 | DISCHARGE

## 2023-11-17 RX ORDER — PANTOPRAZOLE SODIUM 20 MG/1
40 TABLET, DELAYED RELEASE ORAL
Refills: 0 | Status: DISCONTINUED | OUTPATIENT
Start: 2023-11-17 | End: 2023-11-21

## 2023-11-17 RX ORDER — METHADONE HYDROCHLORIDE 40 MG/1
15 TABLET ORAL
Refills: 0 | Status: DISCONTINUED | OUTPATIENT
Start: 2023-11-17 | End: 2023-11-21

## 2023-11-17 RX ORDER — ACETAMINOPHEN 500 MG
1000 TABLET ORAL ONCE
Refills: 0 | Status: COMPLETED | OUTPATIENT
Start: 2023-11-17 | End: 2023-11-17

## 2023-11-17 RX ORDER — METHYLPHENIDATE HCL 5 MG
1 TABLET ORAL
Qty: 0 | Refills: 0 | DISCHARGE

## 2023-11-17 RX ORDER — HYDROMORPHONE HYDROCHLORIDE 2 MG/ML
1 INJECTION INTRAMUSCULAR; INTRAVENOUS; SUBCUTANEOUS ONCE
Refills: 0 | Status: DISCONTINUED | OUTPATIENT
Start: 2023-11-17 | End: 2023-11-17

## 2023-11-17 RX ORDER — POTASSIUM CHLORIDE 20 MEQ
40 PACKET (EA) ORAL ONCE
Refills: 0 | Status: COMPLETED | OUTPATIENT
Start: 2023-11-17 | End: 2023-11-17

## 2023-11-17 RX ADMIN — Medication 133 MILLILITER(S): at 19:29

## 2023-11-17 RX ADMIN — METHADONE HYDROCHLORIDE 15 MILLIGRAM(S): 40 TABLET ORAL at 21:38

## 2023-11-17 RX ADMIN — Medication 150 MILLIGRAM(S): at 21:38

## 2023-11-17 RX ADMIN — ONDANSETRON 4 MILLIGRAM(S): 8 TABLET, FILM COATED ORAL at 09:22

## 2023-11-17 RX ADMIN — ONDANSETRON 4 MILLIGRAM(S): 8 TABLET, FILM COATED ORAL at 17:52

## 2023-11-17 RX ADMIN — HYDROMORPHONE HYDROCHLORIDE 2 MILLIGRAM(S): 2 INJECTION INTRAMUSCULAR; INTRAVENOUS; SUBCUTANEOUS at 08:02

## 2023-11-17 RX ADMIN — HYDROMORPHONE HYDROCHLORIDE 4 MILLIGRAM(S): 2 INJECTION INTRAMUSCULAR; INTRAVENOUS; SUBCUTANEOUS at 21:45

## 2023-11-17 RX ADMIN — HYDROMORPHONE HYDROCHLORIDE 2 MILLIGRAM(S): 2 INJECTION INTRAMUSCULAR; INTRAVENOUS; SUBCUTANEOUS at 01:24

## 2023-11-17 RX ADMIN — Medication 400 MILLIGRAM(S): at 02:26

## 2023-11-17 RX ADMIN — HYDROMORPHONE HYDROCHLORIDE 2 MILLIGRAM(S): 2 INJECTION INTRAMUSCULAR; INTRAVENOUS; SUBCUTANEOUS at 12:05

## 2023-11-17 RX ADMIN — HYDROMORPHONE HYDROCHLORIDE 2 MILLIGRAM(S): 2 INJECTION INTRAMUSCULAR; INTRAVENOUS; SUBCUTANEOUS at 04:40

## 2023-11-17 RX ADMIN — HYDROMORPHONE HYDROCHLORIDE 2 MILLIGRAM(S): 2 INJECTION INTRAMUSCULAR; INTRAVENOUS; SUBCUTANEOUS at 14:47

## 2023-11-17 RX ADMIN — HYDROMORPHONE HYDROCHLORIDE 1 MILLIGRAM(S): 2 INJECTION INTRAMUSCULAR; INTRAVENOUS; SUBCUTANEOUS at 00:30

## 2023-11-17 RX ADMIN — Medication 40 MILLIEQUIVALENT(S): at 12:34

## 2023-11-17 RX ADMIN — PIPERACILLIN AND TAZOBACTAM 200 GRAM(S): 4; .5 INJECTION, POWDER, LYOPHILIZED, FOR SOLUTION INTRAVENOUS at 12:34

## 2023-11-17 RX ADMIN — HYDROMORPHONE HYDROCHLORIDE 2 MILLIGRAM(S): 2 INJECTION INTRAMUSCULAR; INTRAVENOUS; SUBCUTANEOUS at 15:00

## 2023-11-17 RX ADMIN — ONDANSETRON 4 MILLIGRAM(S): 8 TABLET, FILM COATED ORAL at 12:04

## 2023-11-17 RX ADMIN — LIDOCAINE 1 PATCH: 4 CREAM TOPICAL at 02:26

## 2023-11-17 RX ADMIN — METHADONE HYDROCHLORIDE 10 MILLIGRAM(S): 40 TABLET ORAL at 16:56

## 2023-11-17 RX ADMIN — METHOCARBAMOL 1500 MILLIGRAM(S): 500 TABLET, FILM COATED ORAL at 02:26

## 2023-11-17 RX ADMIN — Medication 0.5 MILLIGRAM(S): at 16:56

## 2023-11-17 RX ADMIN — HYDROMORPHONE HYDROCHLORIDE 1 MILLIGRAM(S): 2 INJECTION INTRAMUSCULAR; INTRAVENOUS; SUBCUTANEOUS at 23:04

## 2023-11-17 RX ADMIN — HYDROMORPHONE HYDROCHLORIDE 4 MILLIGRAM(S): 2 INJECTION INTRAMUSCULAR; INTRAVENOUS; SUBCUTANEOUS at 20:01

## 2023-11-17 RX ADMIN — MEMANTINE HYDROCHLORIDE 10 MILLIGRAM(S): 10 TABLET ORAL at 16:57

## 2023-11-17 NOTE — PATIENT PROFILE ADULT - OVER THE PAST TWO WEEKS, HAVE YOU FELT LITTLE INTEREST OR PLEASURE IN DOING THINGS?
Continue current treatments and medications.   Please don't hesitate to call our CF nurse line for any changes, especially with the start of your new job.     no

## 2023-11-17 NOTE — ED PROVIDER NOTE - ATTENDING CONTRIBUTION TO CARE
Estehr: I performed a face to face bedside interview with patient regarding history of present illness, review of symptoms and past medical history. I completed an independent physical exam and ordered tests/medications as needed.  I have discussed patient's plan of care with the resident. The resident assisted in  executing the discussed plan. I was available for any questions or issues that may have arose during the execution of the plan of care.

## 2023-11-17 NOTE — H&P ADULT - NSHPREVIEWOFSYSTEMS_GEN_ALL_CORE
REVIEW OF SYSTEMS:    CONSTITUTIONAL: No weakness, fevers or chills  EYES: No vertigo or throat pain  ENT: No visual changes, eye pain  MOUTH: moist jamila mucosal, no mouth ulcers  NECK: No pain or stiffness  RESPIRATORY: No cough, wheezing, hemoptysis; No shortness of breath  CARDIOVASCULAR: No chest pain or palpitations  GASTROINTESTINAL: + abdominal/epigastric pain. Reports chronic BRBPR  GENITOURINARY: No dysuria, frequency or hematuria  NEUROLOGICAL: No numbness or weakness  SKIN: No itching, rashes  PSYCH: No anxiety or depression

## 2023-11-17 NOTE — ED PROVIDER NOTE - CLINICAL SUMMARY MEDICAL DECISION MAKING FREE TEXT BOX
patient with metastatic breast cancer presenting for 2 unit of PRBC and CT with without contrast due to anemia.  Weakness and chronic abdominal pain but otherwise no complaints.  Plan for labs, transfuse as needed, CTAP and reassess.

## 2023-11-17 NOTE — CONSULT NOTE ADULT - SUBJECTIVE AND OBJECTIVE BOX
Chief Complaint:  Patient is a 38y old  Female who presents with a chief complaint of anemia (17 Nov 2023 10:00)        Patient is a 38y old  Female who presents with a chief complaint of anemia (17 Nov 2023 10:00)      HPI: 39 y/o F with PMHx metastatic breast cancer on chemo/radiation presents to ED for evaluation of anemia. Patient notes she had outpatient labs with her oncologist and was noted to be anemic, she was sent to the ED for blood transfusion and CT scan. She notes the past few months she has been anemic requiring transfusions. She reports abdominal discomfort and rectal bleeding. She describes the abdominal discomfort as lower and notes brown stools with bright red blood per rectum. States the rectal bleeding has been occurring for several years now but worsened in the past few months. She does report a history of hemorrhoids and constipation for which she takes stool softeners. She had an EGD/colonoscopy 10/20/23 at [facility] that showed antral erythema, nonbleeding hemorrhoids, no large mass lesions in colon but prep was poor; pathology was unremarkable. Upon presentation to the ED her Hgb was 6.3, she was given 2 u pRBC. CT A/P was obtained and showed no evidence of active GIB, circumferential thickening of ascending colon which may reflect colitis, fecal load in rectosigmoid colon.       PAST MEDICAL & SURGICAL HISTORY:  H/O compression fracture of spine      Anxiety      Metastatic breast cancer      H/O pleural effusion      Pericardial effusion      S/P tonsillectomy      H/O chest tube placement  12/23/21      S/P pericardiocentesis  12/28/21          REVIEW OF SYSTEMS:   General: Negative  HEENT: Negative  CV: Negative  Respiratory: Negative  GI: See HPI  : Negative  MSK: Negative  Hematologic: Negative  Skin: Negative    MEDICATIONS:   MEDICATIONS  (STANDING):  piperacillin/tazobactam IVPB... 3.375 Gram(s) IV Intermittent once  polyethylene glycol 3350 17 Gram(s) Oral two times a day  potassium chloride    Tablet ER 40 milliEquivalent(s) Oral once    MEDICATIONS  (PRN):      Packed Red Cells Order:  1 Unit  Indication: Hgb <7 gm/dL  Infuse Unit : 3 Hours (11-17-23 @ 01:31)    Packed Red Cells Order:  1 Unit  Indication: Hgb <7 gm/dL  Infuse Unit : 3 Hours (11-17-23 @ 01:31)      DIET:  Diet, Regular (11-17-23 @ 12:28) [Active]          ALLERGIES:   Allergies    pertuzumab (Other (Severe))  Perjeta (Other (Severe))    Intolerances        Substance Use:   ( x ) never used  (  ) other:  Tobacco Usage:  ( x  ) never smoked   (   ) former smoker   (   ) current smoker  (     ) pack year  (        ) last cigarette date  Alcohol Usage: denies     Family History   IBD (  ) Yes   (  ) No  GI Malignancy (  )  Yes    ( x ) No    Health Management  Last Colonoscopy: EGD/colonoscopy 10/20/23 at [facility] that showed antral erythema, nonbleeding hemorrhoids, no large mass lesions in colon but prep was poor; pathology was unremarkable.   Last Endoscopy:     VITAL SIGNS:   Vital Signs Last 24 Hrs  T(C): 36.7 (17 Nov 2023 09:28), Max: 36.8 (16 Nov 2023 23:30)  T(F): 98.1 (17 Nov 2023 09:28), Max: 98.3 (16 Nov 2023 23:30)  HR: 76 (17 Nov 2023 12:05) (72 - 88)  BP: 115/74 (17 Nov 2023 12:05) (100/61 - 115/74)  BP(mean): --  RR: 18 (17 Nov 2023 12:05) (18 - 20)  SpO2: 96% (17 Nov 2023 12:05) (96% - 98%)    Parameters below as of 17 Nov 2023 12:05  Patient On (Oxygen Delivery Method): room air      I&O's Summary      PHYSICAL EXAM:   GENERAL:  No acute distress  HEENT:  NC/AT, conjunctiva clear, sclera anicteric  CHEST:  No increased effort  HEART:  Regular rate  ABDOMEN:  Soft, tender, non-distended, normoactive bowel sounds, no rebound or guarding  EXTREMITIES: No edema  SKIN:  Warm, dry  NEURO:  Calm, cooperative    LABS:                        6.3    3.22  )-----------( 294      ( 17 Nov 2023 00:35 )             20.1     11-17    144  |  110<H>  |  7.5<L>  ----------------------------<  81  3.2<L>   |  24.0  |  0.43<L>    Ca    7.8<L>      17 Nov 2023 00:35    TPro  5.4<L>  /  Alb  3.3  /  TBili  0.7  /  DBili  x   /  AST  29  /  ALT  20  /  AlkPhos  101  11-17    LIVER FUNCTIONS - ( 17 Nov 2023 00:35 )  Alb: 3.3 g/dL / Pro: 5.4 g/dL / ALK PHOS: 101 U/L / ALT: 20 U/L / AST: 29 U/L / GGT: x             PT/INR - ( 17 Nov 2023 02:01 )   PT: 12.7 sec;   INR: 1.15 ratio         PTT - ( 17 Nov 2023 02:01 )  PTT:40.2 sec                                      RADIOLOGY & ADDITIONAL STUDIES:      ACC: [id number] EXAM:  CT ABDOMEN AND PELVIS WAW IC   ORDERED BY: BARRINGTON SAVAGE     PROCEDURE DATE:  11/17/2023          INTERPRETATION:  CLINICAL INFORMATION: Gastrointestinal bleeding.    ADDITIONAL CLINICAL INFORMATION: Other, Non-specified    COMPARISON: CT the abdomen and pelvis from 11/24/2022.    CONTRAST/COMPLICATIONS:  IV Contrast: Omnipaque 350  94 cc administered   6 cc discarded  Oral Contrast: NONE  Complications: None reported at time of study completion    PROCEDURE:  CT of the Abdomen and Pelvis was performed.  Precontrast, Arterial and Delayed phases were performed.  Sagittal and coronal reformats were performed.    FINDINGS:  LOWER CHEST: Patchy peribronchovascular airspace and reticular opacities   at the lung bases. Tip of a central venous catheter in the right atrium.    LIVER: Within normal limits.  BILE DUCTS: Normal caliber.  GALLBLADDER: Within normal limits.  SPLEEN: Within normal limits.  PANCREAS: Within normal limits.  ADRENALS: Within normal limits.  KIDNEYS/URETERS: Within normal limits.    BLADDER: Mild circumferential thickening of the urinary bladder walls   despite underdistention.  REPRODUCTIVE ORGANS: Uterus and adnexa are unremarkable.    BOWEL: No bowel obstruction. Appendix is normal. Circumferential   thickening of the walls of the proximal to mid ascending colon with   mucosal hyperenhancement and mild pericolonic fatty infiltration.No   extravasation of vascular contrast into the enteric lumen to suggest   active source of gastrointestinal bleeding. Moderate to large fecal load   in the rectosigmoid colon.  PERITONEUM: No ascites, pneumoperitoneum, or loculated collection. No   mesenteric lymphadenopathy.  VESSELS: Within normal limits.  RETROPERITONEUM/LYMPH NODES: No lymphadenopathy.  ABDOMINAL WALL: Postsurgical changes in the midline posterior soft   tissues at the level of the sacrum.  BONES: Numerous lytic and sclerotic lesions throughout the osseous   structures. Mild vertebral compression fractures with vertebroplasty  material in the T12 and L1 vertebral body.    IMPRESSION:  No evidence of active gastrointestinal bleeding. Circumferential   thickening of the walls of the proximal to mid ascending colon with   mucosal hyperenhancement and mild pericolonic fatty infiltration which   may reflect a colitis. Endoscopic evaluation is recommended after   appropriate clinical management to exclude underlying malignancy.    Moderate to large fecal load in the rectosigmoid colon.    Mild circumferential thickening ofthe urinary bladder walls despite   underdistention which raises question of cystitis. Correlate with   urinalysis.    Bony metastatic disease with new vertebroplasty material in the T12 and   L1 vertebral bodies.    Patchy peribronchial vascular airspace opacity is reticular opacities at   the lung bases which are new compared with prior exam considerations   include infection/inflammation or new metastatic disease.            --- End of Report ---            CHRISTIAN MOSES DO; Attending Radiologist  This document has been electronically signed. Nov 17 2023  1:56AM  11-17-23 @ 01:05                Accession:                        Collected Date/Time:              Received Date/Time:  [id number]                    10/20/2023 08:24 EDT              10/20/2023 11:55 EDT      Surgical Pathology Report  Diagnosis  A. Duodenum, biopsy:  *   Duodenal mucosa with normal villous architecture without increased intraepithelial lympho  cytes.    B. Gastric, biopsy:  *   Unremarkable gastric mucosa without significant inflammation.  *   No H. pylori identified.    Mike Ashford MD (Electronic Signature)  Reported: 10/24/23 17:13            PHYSICIAN SERVICEGastroenterologyPre-Op / Primary DiagnosisHistory of iron deficiency anemiaAdditional Pre-Op Diagnosesrectal bleedingPost-Op Diagnosesgastritis, hemorrhoids, poor prep hemorrhoids, poor prep  Date of Surgery20-Oct-2023ProcedureColonoscopy, EGD with biopsy Colonoscopy  Surgeon/ProceduralistComanAssistantnoneAnesthesiologist NameWetcherAnesthesiageneralEstimated Blood LossnoneComplications DetailsnoneSpecimen TypenoneTrauma CasenoFindingshemorrhoids, poor prepOperative Report DetailsPROCEDURE: Informed consent was obtained from the patient.  The procedure, its risks, benefits, and alternatives were discussed.  All questions were answered.  The patient was brought to the endoscopy suite and placed in the left lateral decubitus position.  A safety timeout was performed, confirming the patient by name, date of birth and medical record number.  All discrepancies were addressed, confirmed by all present personnel.  The patient was sedated with IV sedation provided by the anesthesiologist.  The Olympus video endoscope was inserted into the oropharynx and guided under direct vision into the esophagus, stomach and duodenum.  The duodenal bulb and second portion were grossly unremarkable.  biopsies taken to r/o celiac sprue. The scope was withdrawn to the stomach and retroflexed. There was minimal, nonspecific, patchy antral erythema.  Random biopsies were taken to rule out H. pylori.  No erosions or ulcers were visualized. The endoscope and insufflated air were slowly removed from the upper GI tract.  The scope was withdrawn to the esophagus.  The Z-line was regular  located at approximately 40 cm from the incisor orifice.  There was no Almanza’s epithelium or esophagitis seen.  Attention was then taken for performing a colonoscopy.  As the patient was already sedated for the EGD, a digital rectal exam was performed, which demonstrated no masses. The patient was sedated with IV sedation provided by the anesthesiologist.  Once an adequate level of sedation was achieved, a digital rectal exam was performed, which demonstrated no masses.  The colonoscope was inserted into the rectum and carbon dioxide was insufflated.  The scope was coursed through the rectum and sigmoid colon, descending colon, transverse colon, ascending colon to the level of the cecum, identified by the appendiceal orifice and ileocecal valve. The scope was then slowly withdrawn with careful examination of all walls and behind all folds. Copious amounts of liquid and formed stool noted throughout the colon. Despite copious amounts of washing and suctioning, small or flat lesions may have been missed. No large mass lesions noted. Once in the rectum, the scope was retroflexed. Small nonbleeding hemorrhoids noted.   The quality of the miralax prep was poor. The patient tolerated the procedure well and after a brief recovery period in the endoscopy suite was transferred to the recovery area in good condition.  There were no apparent complications, and the blood loss from the maneuver was minimal.

## 2023-11-17 NOTE — ED ADULT NURSE NOTE - OBJECTIVE STATEMENT
pt A&Ox4 states she was sent in by primary for low hemoglobin was found to be 6 out patient. pt placed in ISolation for neutropenic precautions and labs obtained currently awaiting lab and Ct results for next step in plan of care

## 2023-11-17 NOTE — ED PROVIDER NOTE - PROGRESS NOTE DETAILS
Second unit finished. Pt reassessed-- Feeling improved. Currently without complaint. Pt wishes to go home. -Patrizia Second unit finished. heme onc recommending cultures and abx as well as GI eval for CT showing colitis. Will admit for IV abx until cultures result. -Patrizia

## 2023-11-17 NOTE — H&P ADULT - ASSESSMENT
38 female PMHx metastatic breast cancer on chemo/radiation (last chemo 11/1) sent in for anemia, suspect 2/2 GIB.     #Acute on chronic anemia suspect 2/2 GIB  s/p 2u prbc, with appropriate response  Hg 6.3 -> 9.2  active T&S  BM regimen  PPI QD  GI following, plan for regular diet, and then clear liquid diet over the weekend  Plan for colonoscopy Monday    #Colitis  CT result noted with colitis  start Zosyn  f/u BCx  Pain regimen as below    #Metastatic breast cancer  c/w pain regimen with home Dilaudid, methadone and lyrica and BM regimen as above  oncology following, ezekiel recs  CT with possible progression of the disease in the spine noted  CT also noted with ?airspace opacities of the chest, f/u formal CT chest  c/w home Namenda BID    DVT ppx: SCD given anemia  Diet: regular -- change to clear liquid for the weekend in preparation for colonoscopy

## 2023-11-17 NOTE — CONSULT NOTE ADULT - SUBJECTIVE AND OBJECTIVE BOX
39 yo F known patient of Dr Chapa at [facility], metastatic breast ca on Enhertu was in office yesterday to get IV iron found to have critical low Hgb 6.2 and was asked to come to ER,  patient to get 2u PRBC , CT a/p and GI evaluation    seen in ER, afebrile, c/o abd pain      ROS  as per HPI    PAST MEDICAL & SURGICAL HISTORY:  H/O compression fracture of spine      Anxiety      Metastatic breast cancer      H/O pleural effusion      Pericardial effusion      S/P tonsillectomy      H/O chest tube placement  12/23/21      S/P pericardiocentesis  12/28/21      Social History:    FAMILY HISTORY:  FH: CVA (cerebrovascular accident)    MEDICATIONS  (STANDING):    MEDICATIONS  (PRN):    ICU Vital Signs Last 24 Hrs  T(C): 36.7 (17 Nov 2023 09:28), Max: 36.8 (16 Nov 2023 23:30)  T(F): 98.1 (17 Nov 2023 09:28), Max: 98.3 (16 Nov 2023 23:30)  HR: 83 (17 Nov 2023 09:28) (72 - 88)  BP: 112/73 (17 Nov 2023 09:28) (100/61 - 114/72)  BP(mean): --  ABP: --  ABP(mean): --  RR: 18 (17 Nov 2023 09:28) (18 - 20)  SpO2: 98% (17 Nov 2023 09:28) (97% - 98%)    O2 Parameters below as of 17 Nov 2023 09:28  Patient On (Oxygen Delivery Method): room air        Gen - alert and oriented  Heart - s1s2 RRR  Lung - CTA b/l  Abd - mild tender  Ext no edema                          6.3    3.22  )-----------( 294      ( 17 Nov 2023 00:35 )             20.1     11-17    144  |  110<H>  |  7.5<L>  ----------------------------<  81  3.2<L>   |  24.0  |  0.43<L>    Ca    7.8<L>      17 Nov 2023 00:35    TPro  5.4<L>  /  Alb  3.3  /  TBili  0.7  /  DBili  x   /  AST  29  /  ALT  20  /  AlkPhos  101  11-17     37 yo F known patient of Dr Chapa at [facility], metastatic breast ca on Enhertu was in office yesterday to get IV iron found to have critical low Hgb 6.2 and was asked to come to ER,  patient to get 2u PRBC , CT a/p and GI evaluation    seen in ER, afebrile, c/o abd pain, c/o BRBPR      ROS  as per HPI    PAST MEDICAL & SURGICAL HISTORY:  H/O compression fracture of spine      Anxiety      Metastatic breast cancer      H/O pleural effusion      Pericardial effusion      S/P tonsillectomy      H/O chest tube placement  12/23/21      S/P pericardiocentesis  12/28/21      Social History:    FAMILY HISTORY:  FH: CVA (cerebrovascular accident)    MEDICATIONS  (STANDING):    MEDICATIONS  (PRN):    ICU Vital Signs Last 24 Hrs  T(C): 36.7 (17 Nov 2023 09:28), Max: 36.8 (16 Nov 2023 23:30)  T(F): 98.1 (17 Nov 2023 09:28), Max: 98.3 (16 Nov 2023 23:30)  HR: 83 (17 Nov 2023 09:28) (72 - 88)  BP: 112/73 (17 Nov 2023 09:28) (100/61 - 114/72)  BP(mean): --  ABP: --  ABP(mean): --  RR: 18 (17 Nov 2023 09:28) (18 - 20)  SpO2: 98% (17 Nov 2023 09:28) (97% - 98%)    O2 Parameters below as of 17 Nov 2023 09:28  Patient On (Oxygen Delivery Method): room air        Gen - alert and oriented  Heart - s1s2 RRR  Lung - CTA b/l  Abd - mild tender  Ext no edema                          6.3    3.22  )-----------( 294      ( 17 Nov 2023 00:35 )             20.1     11-17    144  |  110<H>  |  7.5<L>  ----------------------------<  81  3.2<L>   |  24.0  |  0.43<L>    Ca    7.8<L>      17 Nov 2023 00:35    TPro  5.4<L>  /  Alb  3.3  /  TBili  0.7  /  DBili  x   /  AST  29  /  ALT  20  /  AlkPhos  101  11-17

## 2023-11-17 NOTE — ED PROVIDER NOTE - OBJECTIVE STATEMENT
38 female history of  metastatic breast cancer with multiple complications on chemo/radiation presenting for blood transfusion.  Patient outpatient labs with low hemoglobin was sent to the ED for 2 unit PRBC.  Is having issues with anemia for the past 3 to 4 months, negative endoscopy/colonoscopy.  Oncologist also requesting CT to look for evidence of GI bleed.  Patient endorses acute on chronic abdominal pain tonight while waiting at an outside ER, left their ER to come here after waiting in the waiting room for 4 hours.  Denies black/bloody stools, does suffer from constipation and is on stool softeners.  Denies fever, chest pain, shortness of breath, palpitations, syncope.  Endorses weakness and feeling cold.

## 2023-11-17 NOTE — H&P ADULT - NSHPPHYSICALEXAM_GEN_ALL_CORE
VITALS:   T(C): 36.7 (11-17-23 @ 09:28), Max: 36.8 (11-16-23 @ 23:30)  HR: 76 (11-17-23 @ 12:05) (72 - 88)  BP: 115/74 (11-17-23 @ 12:05) (100/61 - 115/74)  RR: 18 (11-17-23 @ 12:05) (18 - 20)  SpO2: 96% (11-17-23 @ 12:05) (96% - 98%)    GENERAL: NAD, lying in bed comfortably  HEAD:  Atraumatic, Normocephalic  CHEST/LUNG: Clear to auscultation bilaterally; Unlabored respirations  HEART: Regular rate and rhythm; no LE edema  ABDOMEN: BSx4; Soft, lower abd pain discomfort to palpation  NERVOUS SYSTEM:  A&Ox3, no focal deficits   SKIN: No rashes or lesions  PSYCH: Normal affect, euthymic mood

## 2023-11-17 NOTE — H&P ADULT - HISTORY OF PRESENT ILLNESS
38 female PMHx metastatic breast cancer on chemo/radiation (last chemo 11/1) sent in for anemia. Pt had outpatient labs yesterday and found to have Hg down to 6.2 and came back for to the ED for further eval. Reports chronic BRBPR, was at [facility] before and had a colonoscopy, but it was a poor prep and then discharged to have outpatient followup. ROS notable for lower abd pain for a few days. No diarrhea, fever, CP, SOB, n/v/c nor urinary concerns.     ED vitals notable for Hg down to 6.3, s/p 2u prbc, admitted for further care.

## 2023-11-17 NOTE — CONSULT NOTE ADULT - ASSESSMENT
37 yo F known patient of Dr Chapa at [facility], metastatic breast ca on Enhertu was in office yesterday to get IV iron found to have critical low Hgb 6.2 and was asked to come to ER,  patient to get 2u PRBC    1) Breast ca  as above  on active chemotherapy with Enhertu  plan for PET CT and mid dec and possible off lable use of Keytruda  f/u with Dr Chapa upon DC    2) Anemia  hgb 6.3  2u PRBC  CT A/P - No evidence of active gastrointestinal bleeding. Circumferential thickening of the walls of the proximal to mid ascending colon with mucosal hyperenhancement and mild pericolonic fatty infiltration which may reflect a colitis  GI evaluation - will eventualy need Colonoscopy/Endoscopy  would treat with abx   cultures    3) Leukopenia  recent chemo  keep ANC > 1000    4) DVT ppx    will follow 39 yo F known patient of Dr Chapa at [facility], metastatic breast ca on Enhertu was in office yesterday to get IV iron found to have critical low Hgb 6.2 and was asked to come to ER,  patient to get 2u PRBC    1) Breast ca  as above  on active chemotherapy with Enhertu  plan for PET CT and mid dec and possible off lable use of Keytruda  f/u with Dr Chapa upon DC    2) Anemia  hgb 6.3  2u PRBC  CT A/P - No evidence of active gastrointestinal bleeding. Circumferential thickening of the walls of the proximal to mid ascending colon with mucosal hyperenhancement and mild pericolonic fatty infiltration which may reflect a colitis  GI evaluation - recently had colonscopy but poor bowel prep , mayneed repeat  IV abx  cultures    3) Leukopenia  recent chemo  keep ANC > 1000    4) DVT ppx      d/w ER staff  will follow

## 2023-11-17 NOTE — ED ADULT NURSE NOTE - NS TRANSFER PATIENT BELONGINGS
Clothing
ht: 6'4", wt: 213.1 pounds, BMI: 25.9 kg/m2, IBW: 202 pounds +/- 10%     pt admitted c weakness, pt c UTI, on antibiotics; pt MS, also c pain c eating, plan for EGD; noted being followed by Wound Care as well; classified as stage III pressure ulcer on flow sheets and c fungal rash as per Wound Care team.

## 2023-11-17 NOTE — ED PROVIDER NOTE - NSFOLLOWUPINSTRUCTIONS_ED_ALL_ED_FT
-Please follow-up with your primary care doctor in the next 2 days.  Please call tomorrow for an appointment.  If you cannot follow-up with your primary care doctor please return to the ED for any urgent issues.  - You were given a copy of the tests performed today.  Please bring the results with you and review them with your primary care doctor.  - If you have any worsening of symptoms or any other concerns please return to the ED immediately.  - Please continue taking your home medications as directed.    Anemia    Anemia is a condition in which the concentration of red blood cells or hemoglobin in the blood is below normal. Hemoglobin is a substance in red blood cells that carries oxygen to the tissues of the body. Anemia results in not enough oxygen reaching these tissues which can cause symptoms such as weakness, dizziness/lightheadedness, shortness of breath, chest pain, paleness, or nausea. The cause of your anemia may or may not be determined immediately. If your hemoglobin was dangerously low, you may have received a blood transfusion. Usually reactions to transfusions occur immediately but monitor yourself for any fevers, rash, or shortness of breath.    SEEK IMMEDIATE MEDICAL CARE IF YOU HAVE ANY OF THE FOLLOWING SYMPTOMS: extreme weakness/chest pain/shortness of breath, black or bloody stools, vomiting blood, fainting, fever, or any signs of dehydration.

## 2023-11-17 NOTE — CONSULT NOTE ADULT - ASSESSMENT
39 y/o F with PMHx metastatic breast cancer on chemo/radiation presents to ED for evaluation of anemia, also with rectal bleeding, constipation and CT imaging showing possible colitis     #Anemia  #Constipation  #Colitis on CT imaging   #Rectal bleeding  EGD/colon10/20/23- antral erythema, nonbleeding hemorrhoids, no large mass lesions in colon but poor prep; path (-)   CT A/P 11/17/23- no active GIB, circumferential thickening ascending colon ? colitis, fecal load in rectosigmoid colon    - Plan for colonoscopy Monday 11/20/23. Will need multi-day prep due to constipation. Will give enema and miralax today. Regular diet today. Clear liquid diet tomorrow/Sunday.   - Trend CBC, transfuse as needed. Monitor for signs of bleeding.   - Avoid NSAIDs  - PPI QD for GI mucosal cytoprotection  - Oncology consult appreciated  _________________________________________________________________  Assessment and recommendations are final when note is signed by the attending physician.

## 2023-11-17 NOTE — ED PROVIDER NOTE - PHYSICAL EXAMINATION
Gen: Well appearing female in sweatshirt and under multiple blankets feeling cold but in NAD  Head: NC/AT  Neck: trachea midline  Resp:  No distress, CTAP  CV: RRR  GI: Soft diffusely mildly tender  Ext: no deformities  Neuro:  A&O appears non focal  Skin:  Warm and dry as visualized  Psych:  Normal affect and mood

## 2023-11-17 NOTE — ED ADULT NURSE REASSESSMENT NOTE - NS ED NURSE REASSESS COMMENT FT1
Report received from VADIM Cosme.  PRBCs ordered, consent in chart, will begin transfusion when type and screen results.

## 2023-11-17 NOTE — ED PROVIDER NOTE - PATIENT PORTAL LINK FT
You can access the FollowMyHealth Patient Portal offered by [facility] by registering at the following website: [email or web address]. By joining [employer]’s FollowMyHealth portal, you will also be able to view your health information using other applications (apps) compatible with our system.

## 2023-11-18 LAB
ANION GAP SERPL CALC-SCNC: 9 MMOL/L — SIGNIFICANT CHANGE UP (ref 5–17)
ANION GAP SERPL CALC-SCNC: 9 MMOL/L — SIGNIFICANT CHANGE UP (ref 5–17)
BASOPHILS # BLD AUTO: 0.02 K/UL — SIGNIFICANT CHANGE UP (ref 0–0.2)
BASOPHILS # BLD AUTO: 0.02 K/UL — SIGNIFICANT CHANGE UP (ref 0–0.2)
BASOPHILS NFR BLD AUTO: 0.5 % — SIGNIFICANT CHANGE UP (ref 0–2)
BASOPHILS NFR BLD AUTO: 0.5 % — SIGNIFICANT CHANGE UP (ref 0–2)
BUN SERPL-MCNC: 8.2 MG/DL — SIGNIFICANT CHANGE UP (ref 8–20)
BUN SERPL-MCNC: 8.2 MG/DL — SIGNIFICANT CHANGE UP (ref 8–20)
CALCIUM SERPL-MCNC: 8.4 MG/DL — SIGNIFICANT CHANGE UP (ref 8.4–10.5)
CALCIUM SERPL-MCNC: 8.4 MG/DL — SIGNIFICANT CHANGE UP (ref 8.4–10.5)
CHLORIDE SERPL-SCNC: 108 MMOL/L — SIGNIFICANT CHANGE UP (ref 96–108)
CHLORIDE SERPL-SCNC: 108 MMOL/L — SIGNIFICANT CHANGE UP (ref 96–108)
CO2 SERPL-SCNC: 24 MMOL/L — SIGNIFICANT CHANGE UP (ref 22–29)
CO2 SERPL-SCNC: 24 MMOL/L — SIGNIFICANT CHANGE UP (ref 22–29)
CREAT SERPL-MCNC: 0.5 MG/DL — SIGNIFICANT CHANGE UP (ref 0.5–1.3)
CREAT SERPL-MCNC: 0.5 MG/DL — SIGNIFICANT CHANGE UP (ref 0.5–1.3)
EGFR: 124 ML/MIN/1.73M2 — SIGNIFICANT CHANGE UP
EGFR: 124 ML/MIN/1.73M2 — SIGNIFICANT CHANGE UP
EOSINOPHIL # BLD AUTO: 0.07 K/UL — SIGNIFICANT CHANGE UP (ref 0–0.5)
EOSINOPHIL # BLD AUTO: 0.07 K/UL — SIGNIFICANT CHANGE UP (ref 0–0.5)
EOSINOPHIL NFR BLD AUTO: 1.7 % — SIGNIFICANT CHANGE UP (ref 0–6)
EOSINOPHIL NFR BLD AUTO: 1.7 % — SIGNIFICANT CHANGE UP (ref 0–6)
GLUCOSE SERPL-MCNC: 74 MG/DL — SIGNIFICANT CHANGE UP (ref 70–99)
GLUCOSE SERPL-MCNC: 74 MG/DL — SIGNIFICANT CHANGE UP (ref 70–99)
HCT VFR BLD CALC: 27.5 % — LOW (ref 34.5–45)
HCT VFR BLD CALC: 27.5 % — LOW (ref 34.5–45)
HGB BLD-MCNC: 8.7 G/DL — LOW (ref 11.5–15.5)
HGB BLD-MCNC: 8.7 G/DL — LOW (ref 11.5–15.5)
IMM GRANULOCYTES NFR BLD AUTO: 3.2 % — HIGH (ref 0–0.9)
IMM GRANULOCYTES NFR BLD AUTO: 3.2 % — HIGH (ref 0–0.9)
LYMPHOCYTES # BLD AUTO: 0.96 K/UL — LOW (ref 1–3.3)
LYMPHOCYTES # BLD AUTO: 0.96 K/UL — LOW (ref 1–3.3)
LYMPHOCYTES # BLD AUTO: 23.6 % — SIGNIFICANT CHANGE UP (ref 13–44)
LYMPHOCYTES # BLD AUTO: 23.6 % — SIGNIFICANT CHANGE UP (ref 13–44)
MAGNESIUM SERPL-MCNC: 2.1 MG/DL — SIGNIFICANT CHANGE UP (ref 1.6–2.6)
MAGNESIUM SERPL-MCNC: 2.1 MG/DL — SIGNIFICANT CHANGE UP (ref 1.6–2.6)
MCHC RBC-ENTMCNC: 26 PG — LOW (ref 27–34)
MCHC RBC-ENTMCNC: 26 PG — LOW (ref 27–34)
MCHC RBC-ENTMCNC: 31.6 GM/DL — LOW (ref 32–36)
MCHC RBC-ENTMCNC: 31.6 GM/DL — LOW (ref 32–36)
MCV RBC AUTO: 82.1 FL — SIGNIFICANT CHANGE UP (ref 80–100)
MCV RBC AUTO: 82.1 FL — SIGNIFICANT CHANGE UP (ref 80–100)
MONOCYTES # BLD AUTO: 0.47 K/UL — SIGNIFICANT CHANGE UP (ref 0–0.9)
MONOCYTES # BLD AUTO: 0.47 K/UL — SIGNIFICANT CHANGE UP (ref 0–0.9)
MONOCYTES NFR BLD AUTO: 11.6 % — SIGNIFICANT CHANGE UP (ref 2–14)
MONOCYTES NFR BLD AUTO: 11.6 % — SIGNIFICANT CHANGE UP (ref 2–14)
NEUTROPHILS # BLD AUTO: 2.41 K/UL — SIGNIFICANT CHANGE UP (ref 1.8–7.4)
NEUTROPHILS # BLD AUTO: 2.41 K/UL — SIGNIFICANT CHANGE UP (ref 1.8–7.4)
NEUTROPHILS NFR BLD AUTO: 59.4 % — SIGNIFICANT CHANGE UP (ref 43–77)
NEUTROPHILS NFR BLD AUTO: 59.4 % — SIGNIFICANT CHANGE UP (ref 43–77)
NRBC # BLD: 2 /100 WBCS — HIGH (ref 0–0)
NRBC # BLD: 2 /100 WBCS — HIGH (ref 0–0)
PHOSPHATE SERPL-MCNC: 2 MG/DL — LOW (ref 2.4–4.7)
PHOSPHATE SERPL-MCNC: 2 MG/DL — LOW (ref 2.4–4.7)
PLATELET # BLD AUTO: 284 K/UL — SIGNIFICANT CHANGE UP (ref 150–400)
PLATELET # BLD AUTO: 284 K/UL — SIGNIFICANT CHANGE UP (ref 150–400)
POTASSIUM SERPL-MCNC: 3.5 MMOL/L — SIGNIFICANT CHANGE UP (ref 3.5–5.3)
POTASSIUM SERPL-MCNC: 3.5 MMOL/L — SIGNIFICANT CHANGE UP (ref 3.5–5.3)
POTASSIUM SERPL-SCNC: 3.5 MMOL/L — SIGNIFICANT CHANGE UP (ref 3.5–5.3)
POTASSIUM SERPL-SCNC: 3.5 MMOL/L — SIGNIFICANT CHANGE UP (ref 3.5–5.3)
RBC # BLD: 3.35 M/UL — LOW (ref 3.8–5.2)
RBC # BLD: 3.35 M/UL — LOW (ref 3.8–5.2)
RBC # FLD: 20.2 % — HIGH (ref 10.3–14.5)
RBC # FLD: 20.2 % — HIGH (ref 10.3–14.5)
SODIUM SERPL-SCNC: 141 MMOL/L — SIGNIFICANT CHANGE UP (ref 135–145)
SODIUM SERPL-SCNC: 141 MMOL/L — SIGNIFICANT CHANGE UP (ref 135–145)
WBC # BLD: 4.06 K/UL — SIGNIFICANT CHANGE UP (ref 3.8–10.5)
WBC # BLD: 4.06 K/UL — SIGNIFICANT CHANGE UP (ref 3.8–10.5)
WBC # FLD AUTO: 4.06 K/UL — SIGNIFICANT CHANGE UP (ref 3.8–10.5)
WBC # FLD AUTO: 4.06 K/UL — SIGNIFICANT CHANGE UP (ref 3.8–10.5)

## 2023-11-18 PROCEDURE — 99232 SBSQ HOSP IP/OBS MODERATE 35: CPT

## 2023-11-18 PROCEDURE — 71250 CT THORAX DX C-: CPT | Mod: 26

## 2023-11-18 PROCEDURE — 99233 SBSQ HOSP IP/OBS HIGH 50: CPT

## 2023-11-18 RX ORDER — POLYETHYLENE GLYCOL 3350 17 G/17G
238 POWDER, FOR SOLUTION ORAL ONCE
Refills: 0 | Status: DISCONTINUED | OUTPATIENT
Start: 2023-11-18 | End: 2023-11-19

## 2023-11-18 RX ORDER — PIPERACILLIN AND TAZOBACTAM 4; .5 G/20ML; G/20ML
3.38 INJECTION, POWDER, LYOPHILIZED, FOR SOLUTION INTRAVENOUS EVERY 8 HOURS
Refills: 0 | Status: DISCONTINUED | OUTPATIENT
Start: 2023-11-18 | End: 2023-11-21

## 2023-11-18 RX ORDER — HYDROMORPHONE HYDROCHLORIDE 2 MG/ML
2 INJECTION INTRAMUSCULAR; INTRAVENOUS; SUBCUTANEOUS EVERY 8 HOURS
Refills: 0 | Status: DISCONTINUED | OUTPATIENT
Start: 2023-11-18 | End: 2023-11-21

## 2023-11-18 RX ORDER — HYDROMORPHONE HYDROCHLORIDE 2 MG/ML
2 INJECTION INTRAMUSCULAR; INTRAVENOUS; SUBCUTANEOUS ONCE
Refills: 0 | Status: DISCONTINUED | OUTPATIENT
Start: 2023-11-18 | End: 2023-11-18

## 2023-11-18 RX ADMIN — HYDROMORPHONE HYDROCHLORIDE 2 MILLIGRAM(S): 2 INJECTION INTRAMUSCULAR; INTRAVENOUS; SUBCUTANEOUS at 21:00

## 2023-11-18 RX ADMIN — HYDROMORPHONE HYDROCHLORIDE 4 MILLIGRAM(S): 2 INJECTION INTRAMUSCULAR; INTRAVENOUS; SUBCUTANEOUS at 07:15

## 2023-11-18 RX ADMIN — POLYETHYLENE GLYCOL 3350 17 GRAM(S): 17 POWDER, FOR SOLUTION ORAL at 05:38

## 2023-11-18 RX ADMIN — HYDROMORPHONE HYDROCHLORIDE 4 MILLIGRAM(S): 2 INJECTION INTRAMUSCULAR; INTRAVENOUS; SUBCUTANEOUS at 06:51

## 2023-11-18 RX ADMIN — Medication 150 MILLIGRAM(S): at 05:38

## 2023-11-18 RX ADMIN — MEMANTINE HYDROCHLORIDE 10 MILLIGRAM(S): 10 TABLET ORAL at 17:03

## 2023-11-18 RX ADMIN — Medication 150 MILLIGRAM(S): at 14:07

## 2023-11-18 RX ADMIN — Medication 150 MILLIGRAM(S): at 22:31

## 2023-11-18 RX ADMIN — PIPERACILLIN AND TAZOBACTAM 25 GRAM(S): 4; .5 INJECTION, POWDER, LYOPHILIZED, FOR SOLUTION INTRAVENOUS at 14:08

## 2023-11-18 RX ADMIN — HYDROMORPHONE HYDROCHLORIDE 2 MILLIGRAM(S): 2 INJECTION INTRAMUSCULAR; INTRAVENOUS; SUBCUTANEOUS at 10:31

## 2023-11-18 RX ADMIN — PANTOPRAZOLE SODIUM 40 MILLIGRAM(S): 20 TABLET, DELAYED RELEASE ORAL at 05:38

## 2023-11-18 RX ADMIN — HYDROMORPHONE HYDROCHLORIDE 4 MILLIGRAM(S): 2 INJECTION INTRAMUSCULAR; INTRAVENOUS; SUBCUTANEOUS at 17:03

## 2023-11-18 RX ADMIN — Medication 0.5 MILLIGRAM(S): at 08:53

## 2023-11-18 RX ADMIN — HYDROMORPHONE HYDROCHLORIDE 4 MILLIGRAM(S): 2 INJECTION INTRAMUSCULAR; INTRAVENOUS; SUBCUTANEOUS at 17:33

## 2023-11-18 RX ADMIN — METHADONE HYDROCHLORIDE 10 MILLIGRAM(S): 40 TABLET ORAL at 14:07

## 2023-11-18 RX ADMIN — METHADONE HYDROCHLORIDE 10 MILLIGRAM(S): 40 TABLET ORAL at 05:37

## 2023-11-18 RX ADMIN — PIPERACILLIN AND TAZOBACTAM 25 GRAM(S): 4; .5 INJECTION, POWDER, LYOPHILIZED, FOR SOLUTION INTRAVENOUS at 22:31

## 2023-11-18 RX ADMIN — HYDROMORPHONE HYDROCHLORIDE 2 MILLIGRAM(S): 2 INJECTION INTRAMUSCULAR; INTRAVENOUS; SUBCUTANEOUS at 10:01

## 2023-11-18 RX ADMIN — METHADONE HYDROCHLORIDE 15 MILLIGRAM(S): 40 TABLET ORAL at 22:31

## 2023-11-18 RX ADMIN — HYDROMORPHONE HYDROCHLORIDE 1 MILLIGRAM(S): 2 INJECTION INTRAMUSCULAR; INTRAVENOUS; SUBCUTANEOUS at 00:01

## 2023-11-18 RX ADMIN — HYDROMORPHONE HYDROCHLORIDE 2 MILLIGRAM(S): 2 INJECTION INTRAMUSCULAR; INTRAVENOUS; SUBCUTANEOUS at 22:40

## 2023-11-18 RX ADMIN — MEMANTINE HYDROCHLORIDE 10 MILLIGRAM(S): 10 TABLET ORAL at 05:38

## 2023-11-18 NOTE — PROGRESS NOTE ADULT - ASSESSMENT
37 y/o F with PMHx metastatic breast cancer on chemo/radiation presents to ED for evaluation of anemia, also with rectal bleeding, constipation and CT imaging showing possible colitis     #Anemia  #Constipation  #Colitis on CT imaging   #Rectal bleeding  EGD/colon10/20/23- antral erythema, nonbleeding hemorrhoids, no large mass lesions in colon but poor prep; path (-)   CT A/P 11/17/23- no active GIB, circumferential thickening ascending colon ? colitis, fecal load in rectosigmoid colon    - Plan for colonoscopy Monday 11/20/23. Will need multi-day prep due to constipation. Will give Miralax prep today. Clear liquid diet today/tomorrow.   - Trend CBC, transfuse as needed. Monitor for signs of bleeding.   - Avoid NSAIDs  - PPI QD for GI mucosal cytoprotection  - Oncology consult appreciated  - Consider pain management consult   _________________________________________________________________  Assessment and recommendations are final when note is signed by the attending physician.

## 2023-11-18 NOTE — PROGRESS NOTE ADULT - ASSESSMENT
38 female PMHx metastatic breast cancer on chemo/radiation (last chemo 11/1) sent in for anemia, suspect 2/2 GIB/colitis plan for colonoscopy on Mon with GI prep started.     #Acute on chronic anemia suspect 2/2 GIB  s/p 2u prbc, with appropriate response  Hg 6.3 -> 9.2 -> 8.7  BM regimen as per GI   PPI QD  GI following, given regular diet this AM per patient request, clear liquid diet started thereafter   Plan for colonoscopy Monday    #Colitis  CT result noted with colitis  given Zosyn in ED, Continued at this time   f/u BCx  Pain regimen as below    #Metastatic breast cancer  c/w pain regimen with home Dilaudid 4 mg q8 PRN with additional 2 mg IV q8 for breakthrough, methadone and lyrica and BM regimen as above  oncology following, ezekiel recs  CT with possible progression of the disease in the spine noted  CT chest with Dominant left upper lobe apicoposterior segment consolidation and patchy   multifocal bilateral lung opacities, as well as nodular thickening of superior aspect of L. sided pleura likely in setting of metastasis. Patient will need further outpatient imaging on short term basis with outpatient oncology team (plan for PET in Mid December)    c/w home Namenda BID    DVT ppx: SCD given concern for bleed  Code Status: Full   Diet: clear liquid for the weekend in preparation for colonoscopy

## 2023-11-18 NOTE — PROGRESS NOTE ADULT - SUBJECTIVE AND OBJECTIVE BOX
[facility] Division of Hospital Medicine    Chief Complaint:      SUBJECTIVE / OVERNIGHT EVENTS:    Patient seen and examined at bedside, no acute events overnight, patient denies any new complaints, continues to endorse abdominal pain and chronic back pain. Zosyn ordered for possibly colitis, Hgb currently stable, patient noted only minimal BRB after enema given.     MEDICATIONS  (STANDING):  influenza   Vaccine 0.5 milliLiter(s) IntraMuscular once  memantine 10 milliGRAM(s) Oral two times a day  methadone    Tablet 10 milliGRAM(s) Oral <User Schedule>  methadone    Tablet 15 milliGRAM(s) Oral <User Schedule>  pantoprazole    Tablet 40 milliGRAM(s) Oral before breakfast  piperacillin/tazobactam IVPB.. 3.375 Gram(s) IV Intermittent every 8 hours  polyethylene glycol 3350 238 Gram(s) Oral once  pregabalin 150 milliGRAM(s) Oral three times a day    MEDICATIONS  (PRN):  acetaminophen     Tablet .. 650 milliGRAM(s) Oral every 6 hours PRN Temp greater or equal to 38C (100.4F), Mild Pain (1 - 3)  aluminum hydroxide/magnesium hydroxide/simethicone Suspension 30 milliLiter(s) Oral every 4 hours PRN Dyspepsia  HYDROmorphone   Tablet 4 milliGRAM(s) Oral every 8 hours PRN for severe pain  HYDROmorphone  Injectable 2 milliGRAM(s) IV Push every 8 hours PRN Breakthrough Pain  LORazepam     Tablet 0.5 milliGRAM(s) Oral two times a day PRN for anxiety  melatonin 3 milliGRAM(s) Oral at bedtime PRN Insomnia        I&O's Summary      PHYSICAL EXAM:  Vital Signs Last 24 Hrs  T(C): 36.4 (18 Nov 2023 11:15), Max: 36.9 (17 Nov 2023 19:59)  T(F): 97.6 (18 Nov 2023 11:15), Max: 98.4 (17 Nov 2023 19:59)  HR: 86 (18 Nov 2023 11:15) (68 - 96)  BP: 94/61 (18 Nov 2023 11:15) (94/61 - 118/79)  BP(mean): --  RR: 18 (18 Nov 2023 11:15) (18 - 18)  SpO2: 94% (18 Nov 2023 11:15) (91% - 97%)    Parameters below as of 18 Nov 2023 11:15  Patient On (Oxygen Delivery Method): room air        GENERAL: NAD, lying in bed comfortably  HEAD:  Atraumatic, Normocephalic  CHEST/LUNG: Clear to auscultation bilaterally; Unlabored respirations  HEART: Regular rate and rhythm; no LE edema  ABDOMEN: BSx4; Soft, LUQ and LLQ abd pain on palpation  NERVOUS SYSTEM:  A&Ox3, no focal deficits   SKIN: No rashes or lesions  PSYCH: Normal affect, euthymic mood    LABS:                        8.7    4.06  )-----------( 284      ( 18 Nov 2023 05:30 )             27.5     11-18    141  |  108  |  8.2  ----------------------------<  74  3.5   |  24.0  |  0.50    Ca    8.4      18 Nov 2023 05:30  Phos  2.0     11-18  Mg     2.1     11-18    TPro  5.4<L>  /  Alb  3.3  /  TBili  0.7  /  DBili  x   /  AST  29  /  ALT  20  /  AlkPhos  101  11-17    PT/INR - ( 17 Nov 2023 02:01 )   PT: 12.7 sec;   INR: 1.15 ratio         PTT - ( 17 Nov 2023 02:01 )  PTT:40.2 sec      Urinalysis Basic - ( 18 Nov 2023 05:30 )    Color: x / Appearance: x / SG: x / pH: x  Gluc: 74 mg/dL / Ketone: x  / Bili: x / Urobili: x   Blood: x / Protein: x / Nitrite: x   Leuk Esterase: x / RBC: x / WBC x   Sq Epi: x / Non Sq Epi: x / Bacteria: x        CAPILLARY BLOOD GLUCOSE            RADIOLOGY & ADDITIONAL TESTS:  Results Reviewed:   Imaging Personally Reviewed:  Electrocardiogram Personally Reviewed:

## 2023-11-18 NOTE — PROGRESS NOTE ADULT - SUBJECTIVE AND OBJECTIVE BOX
37 yo F known patient of Dr Chapa at [facility], metastatic breast ca on Enhertu was in office yesterday to get IV iron found to have critical low Hgb 6.2 and was asked to come to ER,  patient to get 2u PRBC , CT a/p and GI evaluation    seen at bedside, going for CT chest , afebrile, still has abd pain      MEDICATIONS  (STANDING):  influenza   Vaccine 0.5 milliLiter(s) IntraMuscular once  memantine 10 milliGRAM(s) Oral two times a day  methadone    Tablet 10 milliGRAM(s) Oral <User Schedule>  methadone    Tablet 15 milliGRAM(s) Oral <User Schedule>  pantoprazole    Tablet 40 milliGRAM(s) Oral before breakfast  polyethylene glycol 3350 17 Gram(s) Oral two times a day  pregabalin 150 milliGRAM(s) Oral three times a day    MEDICATIONS  (PRN):  acetaminophen     Tablet .. 650 milliGRAM(s) Oral every 6 hours PRN Temp greater or equal to 38C (100.4F), Mild Pain (1 - 3)  aluminum hydroxide/magnesium hydroxide/simethicone Suspension 30 milliLiter(s) Oral every 4 hours PRN Dyspepsia  HYDROmorphone   Tablet 4 milliGRAM(s) Oral every 8 hours PRN for severe pain  HYDROmorphone  Injectable 2 milliGRAM(s) IV Push once PRN Severe Pain (7 - 10)  LORazepam     Tablet 0.5 milliGRAM(s) Oral two times a day PRN for anxiety  melatonin 3 milliGRAM(s) Oral at bedtime PRN Insomnia            ICU Vital Signs Last 24 Hrs  T(C): 36.5 (18 Nov 2023 04:44), Max: 36.9 (17 Nov 2023 19:59)  T(F): 97.7 (18 Nov 2023 04:44), Max: 98.4 (17 Nov 2023 19:59)  HR: 80 (18 Nov 2023 06:49) (68 - 96)  BP: 118/79 (18 Nov 2023 06:49) (100/66 - 118/79)  BP(mean): --  ABP: --  ABP(mean): --  RR: 18 (18 Nov 2023 06:49) (18 - 18)  SpO2: 91% (18 Nov 2023 04:44) (91% - 97%)    O2 Parameters below as of 17 Nov 2023 21:38  Patient On (Oxygen Delivery Method): room air      Gen - alert and oriented  Heart - s1s2 RRR  Lung - CTA b/l  Abd - mild tender  Ext no edema                          6.3    3.22  )-----------( 294      ( 17 Nov 2023 00:35 )             20.1     11-17    144  |  110<H>  |  7.5<L>  ----------------------------<  81  3.2<L>   |  24.0  |  0.43<L>    Ca    7.8<L>      17 Nov 2023 00:35    TPro  5.4<L>  /  Alb  3.3  /  TBili  0.7  /  DBili  x   /  AST  29  /  ALT  20  /  AlkPhos  101  11-17

## 2023-11-18 NOTE — PROGRESS NOTE ADULT - SUBJECTIVE AND OBJECTIVE BOX
Chief Complaint:  Patient is a 38y old  Female who presents with a chief complaint of Anemia (18 Nov 2023 09:34)      HPI/ 24 hr events: Patient seen and examined at bedside. She is having some abdominal discomfort. She reports 2-3 BMs yesterday after enema, no further rectal bleeding. She declined 2nd enema. She wants to eat breakfast and then will have clears. Vitals are overall stable, Hgb 8.7 today.       REVIEW OF SYSTEMS:   General: Negative  HEENT: Negative  CV: Negative  Respiratory: Negative  GI: See HPI  : Negative  MSK: Negative  Hematologic: Negative  Skin: Negative    MEDICATIONS:   MEDICATIONS  (STANDING):  influenza   Vaccine 0.5 milliLiter(s) IntraMuscular once  memantine 10 milliGRAM(s) Oral two times a day  methadone    Tablet 10 milliGRAM(s) Oral <User Schedule>  methadone    Tablet 15 milliGRAM(s) Oral <User Schedule>  pantoprazole    Tablet 40 milliGRAM(s) Oral before breakfast  polyethylene glycol 3350 238 Gram(s) Oral once  pregabalin 150 milliGRAM(s) Oral three times a day    MEDICATIONS  (PRN):  acetaminophen     Tablet .. 650 milliGRAM(s) Oral every 6 hours PRN Temp greater or equal to 38C (100.4F), Mild Pain (1 - 3)  aluminum hydroxide/magnesium hydroxide/simethicone Suspension 30 milliLiter(s) Oral every 4 hours PRN Dyspepsia  HYDROmorphone   Tablet 4 milliGRAM(s) Oral every 8 hours PRN for severe pain  LORazepam     Tablet 0.5 milliGRAM(s) Oral two times a day PRN for anxiety  melatonin 3 milliGRAM(s) Oral at bedtime PRN Insomnia      Packed Red Cells Order:  1 Unit  Indication: Hgb <7 gm/dL  Infuse Unit : 3 Hours (11-17-23 @ 01:31)    Packed Red Cells Order:  1 Unit  Indication: Hgb <7 gm/dL  Infuse Unit : 3 Hours (11-17-23 @ 01:31)        DIET:  Diet, Regular (11-18-23 @ 08:36) [Active]          ALLERGIES:   Allergies    pertuzumab (Other (Severe))  Perjeta (Other (Severe))    Intolerances        VITAL SIGNS:   Vital Signs Last 24 Hrs  T(C): 36.5 (18 Nov 2023 04:44), Max: 36.9 (17 Nov 2023 19:59)  T(F): 97.7 (18 Nov 2023 04:44), Max: 98.4 (17 Nov 2023 19:59)  HR: 80 (18 Nov 2023 06:49) (68 - 96)  BP: 118/79 (18 Nov 2023 06:49) (100/66 - 118/79)  BP(mean): --  RR: 18 (18 Nov 2023 06:49) (18 - 18)  SpO2: 91% (18 Nov 2023 04:44) (91% - 97%)    Parameters below as of 17 Nov 2023 21:38  Patient On (Oxygen Delivery Method): room air      I&O's Summary      PHYSICAL EXAM:   GENERAL:  No acute distress  HEENT:  NC/AT, conjunctiva clear, sclera anicteric  CHEST:  No increased effort  HEART:  Regular rate  ABDOMEN:  Soft, non-tender, non-distended, normoactive bowel sounds, no rebound or guarding  EXTREMITIES: No edema  SKIN:  Warm, dry  NEURO:  Calm, cooperative    LABS:                        8.7    4.06  )-----------( 284      ( 18 Nov 2023 05:30 )             27.5     Hemoglobin: 8.7 g/dL (11-18-23 @ 05:30)  Hemoglobin: 9.2 g/dL (11-17-23 @ 12:00)  Hemoglobin: 6.3 g/dL (11-17-23 @ 00:35)    11-18    141  |  108  |  8.2  ----------------------------<  74  3.5   |  24.0  |  0.50    Ca    8.4      18 Nov 2023 05:30  Phos  2.0     11-18  Mg     2.1     11-18    TPro  5.4<L>  /  Alb  3.3  /  TBili  0.7  /  DBili  x   /  AST  29  /  ALT  20  /  AlkPhos  101  11-17    LIVER FUNCTIONS - ( 17 Nov 2023 00:35 )  Alb: 3.3 g/dL / Pro: 5.4 g/dL / ALK PHOS: 101 U/L / ALT: 20 U/L / AST: 29 U/L / GGT: x             PT/INR - ( 17 Nov 2023 02:01 )   PT: 12.7 sec;   INR: 1.15 ratio         PTT - ( 17 Nov 2023 02:01 )  PTT:40.2 sec                                        RADIOLOGY & ADDITIONAL STUDIES:      ACC: [id number] EXAM:  CT ABDOMEN AND PELVIS WAW IC   ORDERED BY: BARRINGTON SAVAGE     PROCEDURE DATE:  11/17/2023          INTERPRETATION:  CLINICAL INFORMATION: Gastrointestinal bleeding.    ADDITIONAL CLINICAL INFORMATION: Other, Non-specified    COMPARISON: CT the abdomen and pelvis from 11/24/2022.    CONTRAST/COMPLICATIONS:  IV Contrast: Omnipaque 350  94 cc administered   6 cc discarded  Oral Contrast: NONE  Complications: None reported at time of study completion    PROCEDURE:  CT of the Abdomen and Pelvis was performed.  Precontrast, Arterial and Delayed phases were performed.  Sagittal and coronal reformats were performed.    FINDINGS:  LOWER CHEST: Patchy peribronchovascular airspace and reticular opacities   at the lung bases. Tip of a central venous catheter in the right atrium.    LIVER: Within normal limits.  BILE DUCTS: Normal caliber.  GALLBLADDER: Within normal limits.  SPLEEN: Within normal limits.  PANCREAS: Within normal limits.  ADRENALS: Within normal limits.  KIDNEYS/URETERS: Within normal limits.    BLADDER: Mild circumferential thickening of the urinary bladder walls   despite underdistention.  REPRODUCTIVE ORGANS: Uterus and adnexa are unremarkable.    BOWEL: No bowel obstruction. Appendix is normal. Circumferential   thickening of the walls of the proximal to mid ascending colon with   mucosal hyperenhancement and mild pericolonic fatty infiltration.No   extravasation of vascular contrast into the enteric lumen to suggest   active source of gastrointestinal bleeding. Moderate to large fecal load   in the rectosigmoid colon.  PERITONEUM: No ascites, pneumoperitoneum, or loculated collection. No   mesenteric lymphadenopathy.  VESSELS: Within normal limits.  RETROPERITONEUM/LYMPH NODES: No lymphadenopathy.  ABDOMINAL WALL: Postsurgical changes in the midline posterior soft   tissues at the level of the sacrum.  BONES: Numerous lytic and sclerotic lesions throughout the osseous   structures. Mild vertebral compression fractures with vertebroplasty  material in the T12 and L1 vertebral body.    IMPRESSION:  No evidence of active gastrointestinal bleeding. Circumferential   thickening of the walls of the proximal to mid ascending colon with   mucosal hyperenhancement and mild pericolonic fatty infiltration which   may reflect a colitis. Endoscopic evaluation is recommended after   appropriate clinical management to exclude underlying malignancy.    Moderate to large fecal load in the rectosigmoid colon.    Mild circumferential thickening ofthe urinary bladder walls despite   underdistention which raises question of cystitis. Correlate with   urinalysis.    Bony metastatic disease with new vertebroplasty material in the T12 and   L1 vertebral bodies.    Patchy peribronchial vascular airspace opacity is reticular opacities at   the lung bases which are new compared with prior exam considerations   include infection/inflammation or new metastatic disease.            --- End of Report ---            CHRISTIAN MOSES DO; Attending Radiologist  This document has been electronically signed. Nov 17 2023  1:56AM  11-17-23 @ 01:05

## 2023-11-18 NOTE — PROGRESS NOTE ADULT - NS ATTEND OPT1 GEN_ALL_CORE
Chief complaint:   Chief Complaint   Patient presents with   • Finger     Glen nail in index finger of right hand       Vitals:  Visit Vitals  BP (!) 160/93   Pulse 63   Temp 96.7 °F (35.9 °C) (Tympanic)   Resp 16   Ht 6' 3\" (1.905 m)   Wt 111.7 kg   SpO2 97%   BMI 30.79 kg/m²       HISTORY OF PRESENT ILLNESS     HPI     Isaiah Ramos is a 75 year old male who presents with concerns regarding a glen nail punctured the index finger of his right hand. Patient was lifting a cement block that had a piece of wood attached to it. [employer] nails and this punctured the tip of his index finger. He removed the nail, wash the finger and put Polysporin on it. He now has a Band-Aid on it. He denies any pain. No numbness or tingling. Otherwise feels well. He is here for a tetanus update. Last tetanus was 2011.    Other significant problems:  Patient Active Problem List    Diagnosis Date Noted   • Colon polyps 06/17/2016     Priority: Low     Colonoscopy done 06/17/2016. Polyps removed. Follow-up in 2 years large polyps follow-up.     • Migraine with aura 08/18/2014     Priority: Low   • Pure hypercholesterolemia 12/05/2013     Priority: Low       PAST MEDICAL, FAMILY AND SOCIAL HISTORY     Medications:  Current Outpatient Prescriptions   Medication   • atorvastatin (LIPITOR) 20 MG tablet     No current facility-administered medications for this visit.        Allergies:  ALLERGIES:   Allergen Reactions   • Aleve Other (See Comments)     Rectal bleeding       Past Medical  History/Surgeries:  Past Medical History:   Diagnosis Date   • Colon polyps 6/17/2016    Colonoscopy done 06/17/2016. Polyps removed. Follow-up in 2 years large polyps follow-up.   • Migraine with aura 8/18/2014   • Pure hypercholesterolemia 12/5/2013   • Spinal stenosis    • Varicose veins        Past Surgical History:   Procedure Laterality Date   • ARTHROSCOPY KNEE MEDIAL OR LAT  11/25/2011   • CLOSED REDUCTION FOREARM FRACTURE  1957   • COLONOSCOPY   6/17/16    repeat 2 years-[facility]   • FEMORAL ARTERY REPAIR         Family History:  Family History   Problem Relation Age of Onset   • Diabetes Father    • Cancer Father      Prostate   • OTHER Mother      dementia   • Dementia Mother    • OTHER Sister      Spinal Stenosis   • Thyroid Daughter        Social History:  Social History   Substance Use Topics   • Smoking status: Never Smoker   • Smokeless tobacco: Never Used   • Alcohol use No      Comment: occassion shot of wine       REVIEW OF SYSTEMS     Review of Systems    PHYSICAL EXAM     Physical Exam   Constitutional: He appears well-developed and well-nourished. No distress.   HENT:   Head: Normocephalic and atraumatic.   Eyes: Conjunctivae are normal.   Neck: Normal range of motion.   Cardiovascular: Normal rate.    Pulmonary/Chest: Effort normal. No respiratory distress.   Musculoskeletal:        Right hand: He exhibits normal range of motion, no tenderness and normal capillary refill. Normal sensation noted. Normal strength noted.        Hands:  Neurological: He is alert.   Skin: Skin is warm.   Psychiatric: He has a normal mood and affect.       ASSESSMENT/PLAN     History of physical exam obtained. Patient appears well and in no acute distress.     Evidence of puncture wound. Patient given tetanus update.    He will keep wound clean and dry. He will monitor for signs of infection. Signs and symptoms that prompt urgent return were discussed and understood by patient.    Dr. Chris available for consultation.    I attest my time as attending is greater than 50% of the total combined time spent on qualifying patient care activities by the PA/NP and attending.

## 2023-11-18 NOTE — PROGRESS NOTE ADULT - ASSESSMENT
39 yo F known patient of Dr Chapa at [facility], metastatic breast ca on Enhertu was in office yesterday to get IV iron found to have critical low Hgb 6.2 and was asked to come to ER,  patient to get 2u PRBC    1) Breast ca  as above  on active chemotherapy with Enhertu  plan for PET CT and mid dec and possible off lable use of Keytruda  f/u with Dr Chapa upon DC    2) Anemia  hgb 6.3  2u PRBC  CT A/P - No evidence of active gastrointestinal bleeding. Circumferential thickening of the walls of the proximal to mid ascending colon with mucosal hyperenhancement and mild pericolonic fatty infiltration which may reflect a colitis  GI evaluation - recently had colonscopy but poor bowel prep , going for repeat Colonoscopy Monday  IV abx - PLease start IV abx, d/w nurse  cultures     Hgb > 8 this am  going for CT chest    3) Leukopenia  recent chemo  keep ANC > 1000    4) DVT ppx    will follow

## 2023-11-19 ENCOUNTER — TRANSCRIPTION ENCOUNTER (OUTPATIENT)
Age: 38
End: 2023-11-19

## 2023-11-19 LAB
ALBUMIN SERPL ELPH-MCNC: 3.4 G/DL — SIGNIFICANT CHANGE UP (ref 3.3–5.2)
ALBUMIN SERPL ELPH-MCNC: 3.4 G/DL — SIGNIFICANT CHANGE UP (ref 3.3–5.2)
ALP SERPL-CCNC: 110 U/L — SIGNIFICANT CHANGE UP (ref 40–120)
ALP SERPL-CCNC: 110 U/L — SIGNIFICANT CHANGE UP (ref 40–120)
ALT FLD-CCNC: 23 U/L — SIGNIFICANT CHANGE UP
ALT FLD-CCNC: 23 U/L — SIGNIFICANT CHANGE UP
ANION GAP SERPL CALC-SCNC: 9 MMOL/L — SIGNIFICANT CHANGE UP (ref 5–17)
ANION GAP SERPL CALC-SCNC: 9 MMOL/L — SIGNIFICANT CHANGE UP (ref 5–17)
AST SERPL-CCNC: 34 U/L — HIGH
AST SERPL-CCNC: 34 U/L — HIGH
BASOPHILS # BLD AUTO: 0 K/UL — SIGNIFICANT CHANGE UP (ref 0–0.2)
BASOPHILS # BLD AUTO: 0 K/UL — SIGNIFICANT CHANGE UP (ref 0–0.2)
BASOPHILS NFR BLD AUTO: 0 % — SIGNIFICANT CHANGE UP (ref 0–2)
BASOPHILS NFR BLD AUTO: 0 % — SIGNIFICANT CHANGE UP (ref 0–2)
BILIRUB SERPL-MCNC: 1 MG/DL — SIGNIFICANT CHANGE UP (ref 0.4–2)
BILIRUB SERPL-MCNC: 1 MG/DL — SIGNIFICANT CHANGE UP (ref 0.4–2)
BUN SERPL-MCNC: 6.4 MG/DL — LOW (ref 8–20)
BUN SERPL-MCNC: 6.4 MG/DL — LOW (ref 8–20)
CALCIUM SERPL-MCNC: 8 MG/DL — LOW (ref 8.4–10.5)
CALCIUM SERPL-MCNC: 8 MG/DL — LOW (ref 8.4–10.5)
CHLORIDE SERPL-SCNC: 107 MMOL/L — SIGNIFICANT CHANGE UP (ref 96–108)
CHLORIDE SERPL-SCNC: 107 MMOL/L — SIGNIFICANT CHANGE UP (ref 96–108)
CO2 SERPL-SCNC: 26 MMOL/L — SIGNIFICANT CHANGE UP (ref 22–29)
CO2 SERPL-SCNC: 26 MMOL/L — SIGNIFICANT CHANGE UP (ref 22–29)
CREAT SERPL-MCNC: 0.63 MG/DL — SIGNIFICANT CHANGE UP (ref 0.5–1.3)
CREAT SERPL-MCNC: 0.63 MG/DL — SIGNIFICANT CHANGE UP (ref 0.5–1.3)
EGFR: 117 ML/MIN/1.73M2 — SIGNIFICANT CHANGE UP
EGFR: 117 ML/MIN/1.73M2 — SIGNIFICANT CHANGE UP
EOSINOPHIL # BLD AUTO: 0.07 K/UL — SIGNIFICANT CHANGE UP (ref 0–0.5)
EOSINOPHIL # BLD AUTO: 0.07 K/UL — SIGNIFICANT CHANGE UP (ref 0–0.5)
EOSINOPHIL NFR BLD AUTO: 2 % — SIGNIFICANT CHANGE UP (ref 0–6)
EOSINOPHIL NFR BLD AUTO: 2 % — SIGNIFICANT CHANGE UP (ref 0–6)
GLUCOSE SERPL-MCNC: 81 MG/DL — SIGNIFICANT CHANGE UP (ref 70–99)
GLUCOSE SERPL-MCNC: 81 MG/DL — SIGNIFICANT CHANGE UP (ref 70–99)
HCT VFR BLD CALC: 31.1 % — LOW (ref 34.5–45)
HCT VFR BLD CALC: 31.1 % — LOW (ref 34.5–45)
HGB BLD-MCNC: 9.4 G/DL — LOW (ref 11.5–15.5)
HGB BLD-MCNC: 9.4 G/DL — LOW (ref 11.5–15.5)
IMM GRANULOCYTES NFR BLD AUTO: 1.7 % — HIGH (ref 0–0.9)
IMM GRANULOCYTES NFR BLD AUTO: 1.7 % — HIGH (ref 0–0.9)
LYMPHOCYTES # BLD AUTO: 0.76 K/UL — LOW (ref 1–3.3)
LYMPHOCYTES # BLD AUTO: 0.76 K/UL — LOW (ref 1–3.3)
LYMPHOCYTES # BLD AUTO: 21.2 % — SIGNIFICANT CHANGE UP (ref 13–44)
LYMPHOCYTES # BLD AUTO: 21.2 % — SIGNIFICANT CHANGE UP (ref 13–44)
MCHC RBC-ENTMCNC: 25.5 PG — LOW (ref 27–34)
MCHC RBC-ENTMCNC: 25.5 PG — LOW (ref 27–34)
MCHC RBC-ENTMCNC: 30.2 GM/DL — LOW (ref 32–36)
MCHC RBC-ENTMCNC: 30.2 GM/DL — LOW (ref 32–36)
MCV RBC AUTO: 84.3 FL — SIGNIFICANT CHANGE UP (ref 80–100)
MCV RBC AUTO: 84.3 FL — SIGNIFICANT CHANGE UP (ref 80–100)
MONOCYTES # BLD AUTO: 0.42 K/UL — SIGNIFICANT CHANGE UP (ref 0–0.9)
MONOCYTES # BLD AUTO: 0.42 K/UL — SIGNIFICANT CHANGE UP (ref 0–0.9)
MONOCYTES NFR BLD AUTO: 11.7 % — SIGNIFICANT CHANGE UP (ref 2–14)
MONOCYTES NFR BLD AUTO: 11.7 % — SIGNIFICANT CHANGE UP (ref 2–14)
NEUTROPHILS # BLD AUTO: 2.27 K/UL — SIGNIFICANT CHANGE UP (ref 1.8–7.4)
NEUTROPHILS # BLD AUTO: 2.27 K/UL — SIGNIFICANT CHANGE UP (ref 1.8–7.4)
NEUTROPHILS NFR BLD AUTO: 63.4 % — SIGNIFICANT CHANGE UP (ref 43–77)
NEUTROPHILS NFR BLD AUTO: 63.4 % — SIGNIFICANT CHANGE UP (ref 43–77)
PLATELET # BLD AUTO: 265 K/UL — SIGNIFICANT CHANGE UP (ref 150–400)
PLATELET # BLD AUTO: 265 K/UL — SIGNIFICANT CHANGE UP (ref 150–400)
POTASSIUM SERPL-MCNC: 3.6 MMOL/L — SIGNIFICANT CHANGE UP (ref 3.5–5.3)
POTASSIUM SERPL-MCNC: 3.6 MMOL/L — SIGNIFICANT CHANGE UP (ref 3.5–5.3)
POTASSIUM SERPL-SCNC: 3.6 MMOL/L — SIGNIFICANT CHANGE UP (ref 3.5–5.3)
POTASSIUM SERPL-SCNC: 3.6 MMOL/L — SIGNIFICANT CHANGE UP (ref 3.5–5.3)
PROT SERPL-MCNC: 5.6 G/DL — LOW (ref 6.6–8.7)
PROT SERPL-MCNC: 5.6 G/DL — LOW (ref 6.6–8.7)
RBC # BLD: 3.69 M/UL — LOW (ref 3.8–5.2)
RBC # BLD: 3.69 M/UL — LOW (ref 3.8–5.2)
RBC # FLD: 20.9 % — HIGH (ref 10.3–14.5)
RBC # FLD: 20.9 % — HIGH (ref 10.3–14.5)
SODIUM SERPL-SCNC: 142 MMOL/L — SIGNIFICANT CHANGE UP (ref 135–145)
SODIUM SERPL-SCNC: 142 MMOL/L — SIGNIFICANT CHANGE UP (ref 135–145)
WBC # BLD: 3.58 K/UL — LOW (ref 3.8–10.5)
WBC # BLD: 3.58 K/UL — LOW (ref 3.8–10.5)
WBC # FLD AUTO: 3.58 K/UL — LOW (ref 3.8–10.5)
WBC # FLD AUTO: 3.58 K/UL — LOW (ref 3.8–10.5)

## 2023-11-19 PROCEDURE — 99232 SBSQ HOSP IP/OBS MODERATE 35: CPT

## 2023-11-19 PROCEDURE — 99233 SBSQ HOSP IP/OBS HIGH 50: CPT

## 2023-11-19 RX ORDER — MULTIVIT WITH MIN/MFOLATE/K2 340-15/3 G
296 POWDER (GRAM) ORAL ONCE
Refills: 0 | Status: COMPLETED | OUTPATIENT
Start: 2023-11-19 | End: 2023-11-19

## 2023-11-19 RX ORDER — SOD SULF/SODIUM/NAHCO3/KCL/PEG
4000 SOLUTION, RECONSTITUTED, ORAL ORAL ONCE
Refills: 0 | Status: COMPLETED | OUTPATIENT
Start: 2023-11-19 | End: 2023-11-19

## 2023-11-19 RX ORDER — HYDROMORPHONE HYDROCHLORIDE 2 MG/ML
1 INJECTION INTRAMUSCULAR; INTRAVENOUS; SUBCUTANEOUS ONCE
Refills: 0 | Status: DISCONTINUED | OUTPATIENT
Start: 2023-11-19 | End: 2023-11-19

## 2023-11-19 RX ORDER — ONDANSETRON 8 MG/1
4 TABLET, FILM COATED ORAL EVERY 8 HOURS
Refills: 0 | Status: DISCONTINUED | OUTPATIENT
Start: 2023-11-19 | End: 2023-11-21

## 2023-11-19 RX ORDER — POTASSIUM CHLORIDE 20 MEQ
40 PACKET (EA) ORAL ONCE
Refills: 0 | Status: COMPLETED | OUTPATIENT
Start: 2023-11-19 | End: 2023-11-19

## 2023-11-19 RX ORDER — HYDROMORPHONE HYDROCHLORIDE 2 MG/ML
2 INJECTION INTRAMUSCULAR; INTRAVENOUS; SUBCUTANEOUS ONCE
Refills: 0 | Status: DISCONTINUED | OUTPATIENT
Start: 2023-11-19 | End: 2023-11-19

## 2023-11-19 RX ORDER — METHOCARBAMOL 500 MG/1
750 TABLET, FILM COATED ORAL EVERY 8 HOURS
Refills: 0 | Status: DISCONTINUED | OUTPATIENT
Start: 2023-11-19 | End: 2023-11-21

## 2023-11-19 RX ADMIN — HYDROMORPHONE HYDROCHLORIDE 2 MILLIGRAM(S): 2 INJECTION INTRAMUSCULAR; INTRAVENOUS; SUBCUTANEOUS at 14:54

## 2023-11-19 RX ADMIN — MEMANTINE HYDROCHLORIDE 10 MILLIGRAM(S): 10 TABLET ORAL at 06:00

## 2023-11-19 RX ADMIN — MEMANTINE HYDROCHLORIDE 10 MILLIGRAM(S): 10 TABLET ORAL at 17:49

## 2023-11-19 RX ADMIN — HYDROMORPHONE HYDROCHLORIDE 2 MILLIGRAM(S): 2 INJECTION INTRAMUSCULAR; INTRAVENOUS; SUBCUTANEOUS at 22:13

## 2023-11-19 RX ADMIN — HYDROMORPHONE HYDROCHLORIDE 4 MILLIGRAM(S): 2 INJECTION INTRAMUSCULAR; INTRAVENOUS; SUBCUTANEOUS at 04:12

## 2023-11-19 RX ADMIN — Medication 150 MILLIGRAM(S): at 22:15

## 2023-11-19 RX ADMIN — PIPERACILLIN AND TAZOBACTAM 25 GRAM(S): 4; .5 INJECTION, POWDER, LYOPHILIZED, FOR SOLUTION INTRAVENOUS at 22:16

## 2023-11-19 RX ADMIN — METHADONE HYDROCHLORIDE 15 MILLIGRAM(S): 40 TABLET ORAL at 22:15

## 2023-11-19 RX ADMIN — Medication 150 MILLIGRAM(S): at 14:45

## 2023-11-19 RX ADMIN — HYDROMORPHONE HYDROCHLORIDE 4 MILLIGRAM(S): 2 INJECTION INTRAMUSCULAR; INTRAVENOUS; SUBCUTANEOUS at 06:53

## 2023-11-19 RX ADMIN — METHOCARBAMOL 750 MILLIGRAM(S): 500 TABLET, FILM COATED ORAL at 22:15

## 2023-11-19 RX ADMIN — METHADONE HYDROCHLORIDE 10 MILLIGRAM(S): 40 TABLET ORAL at 06:00

## 2023-11-19 RX ADMIN — HYDROMORPHONE HYDROCHLORIDE 1 MILLIGRAM(S): 2 INJECTION INTRAMUSCULAR; INTRAVENOUS; SUBCUTANEOUS at 11:48

## 2023-11-19 RX ADMIN — PIPERACILLIN AND TAZOBACTAM 25 GRAM(S): 4; .5 INJECTION, POWDER, LYOPHILIZED, FOR SOLUTION INTRAVENOUS at 06:00

## 2023-11-19 RX ADMIN — METHADONE HYDROCHLORIDE 10 MILLIGRAM(S): 40 TABLET ORAL at 14:51

## 2023-11-19 RX ADMIN — Medication 40 MILLIEQUIVALENT(S): at 17:49

## 2023-11-19 RX ADMIN — ONDANSETRON 4 MILLIGRAM(S): 8 TABLET, FILM COATED ORAL at 14:50

## 2023-11-19 RX ADMIN — HYDROMORPHONE HYDROCHLORIDE 2 MILLIGRAM(S): 2 INJECTION INTRAMUSCULAR; INTRAVENOUS; SUBCUTANEOUS at 18:12

## 2023-11-19 RX ADMIN — Medication 296 MILLILITER(S): at 11:47

## 2023-11-19 RX ADMIN — Medication 150 MILLIGRAM(S): at 06:00

## 2023-11-19 RX ADMIN — METHOCARBAMOL 750 MILLIGRAM(S): 500 TABLET, FILM COATED ORAL at 14:44

## 2023-11-19 RX ADMIN — HYDROMORPHONE HYDROCHLORIDE 4 MILLIGRAM(S): 2 INJECTION INTRAMUSCULAR; INTRAVENOUS; SUBCUTANEOUS at 12:26

## 2023-11-19 RX ADMIN — Medication 4000 MILLILITER(S): at 16:07

## 2023-11-19 RX ADMIN — HYDROMORPHONE HYDROCHLORIDE 1 MILLIGRAM(S): 2 INJECTION INTRAMUSCULAR; INTRAVENOUS; SUBCUTANEOUS at 10:48

## 2023-11-19 RX ADMIN — HYDROMORPHONE HYDROCHLORIDE 4 MILLIGRAM(S): 2 INJECTION INTRAMUSCULAR; INTRAVENOUS; SUBCUTANEOUS at 13:26

## 2023-11-19 RX ADMIN — Medication 10 MILLIGRAM(S): at 11:02

## 2023-11-19 RX ADMIN — HYDROMORPHONE HYDROCHLORIDE 2 MILLIGRAM(S): 2 INJECTION INTRAMUSCULAR; INTRAVENOUS; SUBCUTANEOUS at 15:54

## 2023-11-19 RX ADMIN — PIPERACILLIN AND TAZOBACTAM 25 GRAM(S): 4; .5 INJECTION, POWDER, LYOPHILIZED, FOR SOLUTION INTRAVENOUS at 14:44

## 2023-11-19 RX ADMIN — PANTOPRAZOLE SODIUM 40 MILLIGRAM(S): 20 TABLET, DELAYED RELEASE ORAL at 06:01

## 2023-11-19 RX ADMIN — HYDROMORPHONE HYDROCHLORIDE 2 MILLIGRAM(S): 2 INJECTION INTRAMUSCULAR; INTRAVENOUS; SUBCUTANEOUS at 19:12

## 2023-11-19 RX ADMIN — Medication 0.5 MILLIGRAM(S): at 17:49

## 2023-11-19 RX ADMIN — HYDROMORPHONE HYDROCHLORIDE 2 MILLIGRAM(S): 2 INJECTION INTRAMUSCULAR; INTRAVENOUS; SUBCUTANEOUS at 06:56

## 2023-11-19 NOTE — DIETITIAN INITIAL EVALUATION ADULT - ADD RECOMMEND
When medically feasible advance to FLD->low fiber/reside with Ensure TID; Encourage HBV protein sources; Consider appetite stimulant; Rx MVI daily; Monitor electrolytes, correct PRN

## 2023-11-19 NOTE — PROGRESS NOTE ADULT - SUBJECTIVE AND OBJECTIVE BOX
37 yo F known patient of Dr Chapa at [facility], metastatic breast ca on Enhertu was in office yesterday to get IV iron found to have critical low Hgb 6.2 and was asked to come to ER,  patient to get 2u PRBC , CT a/p and GI evaluation    seen at bedside, afebrile, no SOB, no bleeding  Hgb 9.4        ICU Vital Signs Last 24 Hrs  T(C): 36.5 (18 Nov 2023 04:44), Max: 36.9 (17 Nov 2023 19:59)  T(F): 97.7 (18 Nov 2023 04:44), Max: 98.4 (17 Nov 2023 19:59)  HR: 80 (18 Nov 2023 06:49) (68 - 96)  BP: 118/79 (18 Nov 2023 06:49) (100/66 - 118/79)  BP(mean): --  ABP: --  ABP(mean): --  RR: 18 (18 Nov 2023 06:49) (18 - 18)  SpO2: 91% (18 Nov 2023 04:44) (91% - 97%)    O2 Parameters below as of 17 Nov 2023 21:38  Patient On (Oxygen Delivery Method): room air      Gen - alert and oriented  Heart - s1s2 RRR  Lung - CTA b/l  Abd - mild tender  Ext no edema                          6.3    3.22  )-----------( 294      ( 17 Nov 2023 00:35 )             20.1     11-17    144  |  110<H>  |  7.5<L>  ----------------------------<  81  3.2<L>   |  24.0  |  0.43<L>    Ca    7.8<L>      17 Nov 2023 00:35    TPro  5.4<L>  /  Alb  3.3  /  TBili  0.7  /  DBili  x   /  AST  29  /  ALT  20  /  AlkPhos  101  11-17

## 2023-11-19 NOTE — DIETITIAN INITIAL EVALUATION ADULT - PERTINENT MEDS FT
MEDICATIONS  (STANDING):  influenza   Vaccine 0.5 milliLiter(s) IntraMuscular once  memantine 10 milliGRAM(s) Oral two times a day  methadone    Tablet 10 milliGRAM(s) Oral <User Schedule>  methadone    Tablet 15 milliGRAM(s) Oral <User Schedule>  methocarbamol 750 milliGRAM(s) Oral every 8 hours  pantoprazole    Tablet 40 milliGRAM(s) Oral before breakfast  piperacillin/tazobactam IVPB.. 3.375 Gram(s) IV Intermittent every 8 hours  polyethylene glycol/electrolyte Solution. 4000 milliLiter(s) Oral once  potassium chloride    Tablet ER 40 milliEquivalent(s) Oral once  pregabalin 150 milliGRAM(s) Oral three times a day    MEDICATIONS  (PRN):  acetaminophen     Tablet .. 650 milliGRAM(s) Oral every 6 hours PRN Temp greater or equal to 38C (100.4F), Mild Pain (1 - 3)  aluminum hydroxide/magnesium hydroxide/simethicone Suspension 30 milliLiter(s) Oral every 4 hours PRN Dyspepsia  HYDROmorphone   Tablet 4 milliGRAM(s) Oral every 8 hours PRN for severe pain  HYDROmorphone  Injectable 2 milliGRAM(s) IV Push every 8 hours PRN Breakthrough Pain  LORazepam     Tablet 0.5 milliGRAM(s) Oral two times a day PRN for anxiety  melatonin 3 milliGRAM(s) Oral at bedtime PRN Insomnia  ondansetron Injectable 4 milliGRAM(s) IV Push every 8 hours PRN Nausea and/or Vomiting

## 2023-11-19 NOTE — DIETITIAN INITIAL EVALUATION ADULT - OTHER INFO
38 female PMHx metastatic breast cancer on chemo/radiation (last chemo 11/1) sent in for anemia. Pt had outpatient labs yesterday and found to have Hg down to 6.2 and came back for to the ED for further eval. Reports chronic BRBPR, was at [facility] before and had a colonoscopy, but it was a poor prep and then discharged to have outpatient followup. ROS notable for lower abd pain for a few days.

## 2023-11-19 NOTE — PROGRESS NOTE ADULT - ASSESSMENT
39 yo F known patient of Dr Chapa at [facility], metastatic breast ca on Enhertu was in office yesterday to get IV iron found to have critical low Hgb 6.2 and was asked to come to ER,  patient to get 2u PRBC    1) Breast ca  as above  on active chemotherapy with Enhertu  plan for PET CT and mid dec and possible off lable use of Keytruda  f/u with Dr Chapa upon DC    2) Anemia  hgb 6.3  2u PRBC  CT A/P - No evidence of active gastrointestinal bleeding. Circumferential thickening of the walls of the proximal to mid ascending colon with mucosal hyperenhancement and mild pericolonic fatty infiltration which may reflect a colitis  GI evaluation - recently had colonscopy but poor bowel prep , going for repeat Colonoscopy Monday  on Zosyn, cultures negative    Hgb 9.4 this am  reviewed CT chest     3) Leukopenia  recent chemo  keep ANC > 1000    4) DVT ppx    will follow

## 2023-11-19 NOTE — DIETITIAN INITIAL EVALUATION ADULT - NS FNS DIET ORDER
Diet, NPO after Midnight:      NPO Start Date: 19-Nov-2023,   NPO Start Time: 23:59  Except Medications (11-19-23 @ 10:36)  Diet, Clear Liquid:   No Red Dye  Supplement Feeding Modality:  Oral  Ensure Clear Cans or Servings Per Day:  3       Frequency:  Three Times a day (11-19-23 @ 09:02)

## 2023-11-19 NOTE — PROGRESS NOTE ADULT - SUBJECTIVE AND OBJECTIVE BOX
Chief Complaint:  Patient is a 38y old  Female who presents with a chief complaint of Anemia (19 Nov 2023 09:31)      HPI/ 24 hr events: Patient seen and examined at bedside. She is c/o pain in her hip. She has not had a BM or bleeding since the enema given in the ED. She was not given the Miralax prep yesterday. She is tolerating clear diet. She wants to try and prep for colonoscopy tomorrow though informed likely will be incomplete prep and still need second day of prep.  Abdominal pain improving. Vitals are overall stable.       REVIEW OF SYSTEMS:   General: Negative  HEENT: Negative  CV: Negative  Respiratory: Negative  GI: See HPI  : Negative  MSK: Negative  Hematologic: Negative  Skin: Negative    MEDICATIONS:   MEDICATIONS  (STANDING):  HYDROmorphone  Injectable 1 milliGRAM(s) IV Push once  influenza   Vaccine 0.5 milliLiter(s) IntraMuscular once  magnesium citrate Oral Solution 296 milliLiter(s) Oral once  memantine 10 milliGRAM(s) Oral two times a day  methadone    Tablet 10 milliGRAM(s) Oral <User Schedule>  methadone    Tablet 15 milliGRAM(s) Oral <User Schedule>  methocarbamol 750 milliGRAM(s) Oral every 8 hours  pantoprazole    Tablet 40 milliGRAM(s) Oral before breakfast  piperacillin/tazobactam IVPB.. 3.375 Gram(s) IV Intermittent every 8 hours  polyethylene glycol/electrolyte Solution. 4000 milliLiter(s) Oral once  pregabalin 150 milliGRAM(s) Oral three times a day    MEDICATIONS  (PRN):  acetaminophen     Tablet .. 650 milliGRAM(s) Oral every 6 hours PRN Temp greater or equal to 38C (100.4F), Mild Pain (1 - 3)  aluminum hydroxide/magnesium hydroxide/simethicone Suspension 30 milliLiter(s) Oral every 4 hours PRN Dyspepsia  bisacodyl Suppository 10 milliGRAM(s) Rectal once PRN Constipation  HYDROmorphone   Tablet 4 milliGRAM(s) Oral every 8 hours PRN for severe pain  HYDROmorphone  Injectable 2 milliGRAM(s) IV Push every 8 hours PRN Breakthrough Pain  LORazepam     Tablet 0.5 milliGRAM(s) Oral two times a day PRN for anxiety  melatonin 3 milliGRAM(s) Oral at bedtime PRN Insomnia      Packed Red Cells Order:  1 Unit  Indication: Hgb <7 gm/dL  Infuse Unit : 3 Hours (11-17-23 @ 01:31)  Packed Red Cells Order:  1 Unit  Indication: Hgb <7 gm/dL  Infuse Unit : 3 Hours (11-17-23 @ 01:31)        DIET:  Diet, NPO after Midnight:      NPO Start Date: 19-Nov-2023,   NPO Start Time: 23:59  Except Medications (11-19-23 @ 10:36) [Active]  Diet, Clear Liquid:   No Red Dye  Supplement Feeding Modality:  Oral  Ensure Clear Cans or Servings Per Day:  3       Frequency:  Three Times a day (11-19-23 @ 09:02) [Active]          ALLERGIES:   Allergies    pertuzumab (Other (Severe))  Perjeta (Other (Severe))    Intolerances        VITAL SIGNS:   Vital Signs Last 24 Hrs  T(C): 36.6 (19 Nov 2023 04:10), Max: 36.6 (19 Nov 2023 04:10)  T(F): 97.9 (19 Nov 2023 04:10), Max: 97.9 (19 Nov 2023 04:10)  HR: 88 (19 Nov 2023 06:58) (68 - 88)  BP: 102/67 (19 Nov 2023 06:58) (94/61 - 114/76)  BP(mean): --  RR: 16 (19 Nov 2023 04:10) (16 - 18)  SpO2: 97% (19 Nov 2023 04:10) (94% - 99%)    Parameters below as of 19 Nov 2023 04:10  Patient On (Oxygen Delivery Method): room air      I&O's Summary    18 Nov 2023 07:01  -  19 Nov 2023 07:00  --------------------------------------------------------  IN: 600 mL / OUT: 0 mL / NET: 600 mL        PHYSICAL EXAM:   GENERAL:  No acute distress  HEENT:  NC/AT, conjunctiva clear, sclera anicteric  CHEST:  No increased effort  HEART:  Regular rate  ABDOMEN:  Soft, non-tender, non-distended  EXTREMITIES: No edema  SKIN:  Warm, dry  NEURO:  Calm, cooperative    LABS:                        9.4    3.58  )-----------( 265      ( 19 Nov 2023 06:25 )             31.1     Hemoglobin: 9.4 g/dL (11-19-23 @ 06:25)  Hemoglobin: 8.7 g/dL (11-18-23 @ 05:30)  Hemoglobin: 9.2 g/dL (11-17-23 @ 12:00)  Hemoglobin: 6.3 g/dL (11-17-23 @ 00:35)    11-19    142  |  107  |  6.4<L>  ----------------------------<  81  3.6   |  26.0  |  0.63    Ca    8.0<L>      19 Nov 2023 06:25  Phos  2.0     11-18  Mg     2.1     11-18    TPro  5.6<L>  /  Alb  3.4  /  TBili  1.0  /  DBili  x   /  AST  34<H>  /  ALT  23  /  AlkPhos  110  11-19    LIVER FUNCTIONS - ( 19 Nov 2023 06:25 )  Alb: 3.4 g/dL / Pro: 5.6 g/dL / ALK PHOS: 110 U/L / ALT: 23 U/L / AST: 34 U/L / GGT: x                 Culture - Blood (collected 17 Nov 2023 12:15)  Source: .Blood Blood-Venous  Preliminary Report (18 Nov 2023 20:01):    No growth at 24 hours    Culture - Blood (collected 17 Nov 2023 12:00)  Source: .Blood Blood-Venous  Preliminary Report (18 Nov 2023 20:01):    No growth at 24 hours                                          RADIOLOGY & ADDITIONAL STUDIES:      ACC: [id number] EXAM:  CT ABDOMEN AND PELVIS WAW IC   ORDERED BY: BARRINGTON SAVAGE     PROCEDURE DATE:  11/17/2023          INTERPRETATION:  CLINICAL INFORMATION: Gastrointestinal bleeding.    ADDITIONAL CLINICAL INFORMATION: Other, Non-specified    COMPARISON: CT the abdomen and pelvis from 11/24/2022.    CONTRAST/COMPLICATIONS:  IV Contrast: Omnipaque 350  94 cc administered   6 cc discarded  Oral Contrast: NONE  Complications: None reported at time of study completion    PROCEDURE:  CT of the Abdomen and Pelvis was performed.  Precontrast, Arterial and Delayed phases were performed.  Sagittal and coronal reformats were performed.    FINDINGS:  LOWER CHEST: Patchy peribronchovascular airspace and reticular opacities   at the lung bases. Tip of a central venous catheter in the right atrium.    LIVER: Within normal limits.  BILE DUCTS: Normal caliber.  GALLBLADDER: Within normal limits.  SPLEEN: Within normal limits.  PANCREAS: Within normal limits.  ADRENALS: Within normal limits.  KIDNEYS/URETERS: Within normal limits.    BLADDER: Mild circumferential thickening of the urinary bladder walls   despite underdistention.  REPRODUCTIVE ORGANS: Uterus and adnexa are unremarkable.    BOWEL: No bowel obstruction. Appendix is normal. Circumferential   thickening of the walls of the proximal to mid ascending colon with   mucosal hyperenhancement and mild pericolonic fatty infiltration.No   extravasation of vascular contrast into the enteric lumen to suggest   active source of gastrointestinal bleeding. Moderate to large fecal load   in the rectosigmoid colon.  PERITONEUM: No ascites, pneumoperitoneum, or loculated collection. No   mesenteric lymphadenopathy.  VESSELS: Within normal limits.  RETROPERITONEUM/LYMPH NODES: No lymphadenopathy.  ABDOMINAL WALL: Postsurgical changes in the midline posterior soft   tissues at the level of the sacrum.  BONES: Numerous lytic and sclerotic lesions throughout the osseous   structures. Mild vertebral compression fractures with vertebroplasty  material in the T12 and L1 vertebral body.    IMPRESSION:  No evidence of active gastrointestinal bleeding. Circumferential   thickening of the walls of the proximal to mid ascending colon with   mucosal hyperenhancement and mild pericolonic fatty infiltration which   may reflect a colitis. Endoscopic evaluation is recommended after   appropriate clinical management to exclude underlying malignancy.    Moderate to large fecal load in the rectosigmoid colon.    Mild circumferential thickening ofthe urinary bladder walls despite   underdistention which raises question of cystitis. Correlate with   urinalysis.    Bony metastatic disease with new vertebroplasty material in the T12 and   L1 vertebral bodies.    Patchy peribronchial vascular airspace opacity is reticular opacities at   the lung bases which are new compared with prior exam considerations   include infection/inflammation or new metastatic disease.            --- End of Report ---            CHRISTIAN MOSES DO; Attending Radiologist  This document has been electronically signed. Nov 17 2023  1:56AM  11-17-23 @ 01:05

## 2023-11-19 NOTE — DIETITIAN INITIAL EVALUATION ADULT - PERTINENT LABORATORY DATA
11-19    142  |  107  |  6.4<L>  ----------------------------<  81  3.6   |  26.0  |  0.63    Ca    8.0<L>      19 Nov 2023 06:25  Phos  2.0     11-18  Mg     2.1     11-18    TPro  5.6<L>  /  Alb  3.4  /  TBili  1.0  /  DBili  x   /  AST  34<H>  /  ALT  23  /  AlkPhos  110  11-19

## 2023-11-19 NOTE — PROGRESS NOTE ADULT - SUBJECTIVE AND OBJECTIVE BOX
[facility] Division of Hospital Medicine    Chief Complaint:      SUBJECTIVE / OVERNIGHT EVENTS:    Patient seen and examined at bedside, no acute events overnight, patient denies any new complaints, states she was not given Miralax overnight, has not had a BM still. Will give prep tonight as ordered and continue to monitor for potential colonoscopy in AM,   Pain regiment adjusted with Robaxin added.     MEDICATIONS  (STANDING):  influenza   Vaccine 0.5 milliLiter(s) IntraMuscular once  memantine 10 milliGRAM(s) Oral two times a day  methadone    Tablet 10 milliGRAM(s) Oral <User Schedule>  methadone    Tablet 15 milliGRAM(s) Oral <User Schedule>  methocarbamol 750 milliGRAM(s) Oral every 8 hours  pantoprazole    Tablet 40 milliGRAM(s) Oral before breakfast  piperacillin/tazobactam IVPB.. 3.375 Gram(s) IV Intermittent every 8 hours  polyethylene glycol/electrolyte Solution. 4000 milliLiter(s) Oral once  potassium chloride    Tablet ER 40 milliEquivalent(s) Oral once  pregabalin 150 milliGRAM(s) Oral three times a day    MEDICATIONS  (PRN):  acetaminophen     Tablet .. 650 milliGRAM(s) Oral every 6 hours PRN Temp greater or equal to 38C (100.4F), Mild Pain (1 - 3)  aluminum hydroxide/magnesium hydroxide/simethicone Suspension 30 milliLiter(s) Oral every 4 hours PRN Dyspepsia  HYDROmorphone   Tablet 4 milliGRAM(s) Oral every 8 hours PRN for severe pain  HYDROmorphone  Injectable 2 milliGRAM(s) IV Push every 8 hours PRN Breakthrough Pain  LORazepam     Tablet 0.5 milliGRAM(s) Oral two times a day PRN for anxiety  melatonin 3 milliGRAM(s) Oral at bedtime PRN Insomnia  ondansetron Injectable 4 milliGRAM(s) IV Push every 8 hours PRN Nausea and/or Vomiting        I&O's Summary    18 Nov 2023 07:01  -  19 Nov 2023 07:00  --------------------------------------------------------  IN: 600 mL / OUT: 0 mL / NET: 600 mL        PHYSICAL EXAM:  Vital Signs Last 24 Hrs  T(C): 36.6 (19 Nov 2023 04:10), Max: 36.6 (19 Nov 2023 04:10)  T(F): 97.9 (19 Nov 2023 04:10), Max: 97.9 (19 Nov 2023 04:10)  HR: 88 (19 Nov 2023 06:58) (68 - 88)  BP: 102/67 (19 Nov 2023 06:58) (102/67 - 114/76)  BP(mean): --  RR: 16 (19 Nov 2023 04:10) (16 - 18)  SpO2: 97% (19 Nov 2023 04:10) (97% - 99%)    Parameters below as of 19 Nov 2023 04:10  Patient On (Oxygen Delivery Method): room air        GENERAL: NAD, lying in bed comfortably  HEAD:  Atraumatic, Normocephalic  CHEST/LUNG: Clear to auscultation bilaterally; Unlabored respirations  HEART: Regular rate and rhythm; no LE edema  ABDOMEN: BSx4; Soft, LUQ and LLQ abd pain on palpation  NERVOUS SYSTEM:  A&Ox3, no focal deficits   SKIN: No rashes or lesions  PSYCH: Normal affect, euthymic mood    LABS:                        9.4    3.58  )-----------( 265      ( 19 Nov 2023 06:25 )             31.1     11-19    142  |  107  |  6.4<L>  ----------------------------<  81  3.6   |  26.0  |  0.63    Ca    8.0<L>      19 Nov 2023 06:25  Phos  2.0     11-18  Mg     2.1     11-18    TPro  5.6<L>  /  Alb  3.4  /  TBili  1.0  /  DBili  x   /  AST  34<H>  /  ALT  23  /  AlkPhos  110  11-19          Urinalysis Basic - ( 19 Nov 2023 06:25 )    Color: x / Appearance: x / SG: x / pH: x  Gluc: 81 mg/dL / Ketone: x  / Bili: x / Urobili: x   Blood: x / Protein: x / Nitrite: x   Leuk Esterase: x / RBC: x / WBC x   Sq Epi: x / Non Sq Epi: x / Bacteria: x        Culture - Blood (collected 17 Nov 2023 12:15)  Source: .Blood Blood-Venous  Preliminary Report (18 Nov 2023 20:01):    No growth at 24 hours    Culture - Blood (collected 17 Nov 2023 12:00)  Source: .Blood Blood-Venous  Preliminary Report (18 Nov 2023 20:01):    No growth at 24 hours      CAPILLARY BLOOD GLUCOSE            RADIOLOGY & ADDITIONAL TESTS:  Results Reviewed:   Imaging Personally Reviewed:  Electrocardiogram Personally Reviewed:

## 2023-11-19 NOTE — PROGRESS NOTE ADULT - NS ATTEND AMEND GEN_ALL_CORE FT
Patient seen and examined at bedside. Here with abdominal pain, constipation and rectal bleeding ?hemorrhoidal. Clear liquids today along with miralax for 2 day prep. Pain control as needed
Patient seen and examined at bedside. Here with constipation and intermittent rectal bleeding associated with abdominal pain. Pending bowel regimen with plan for eventual in-patient colon

## 2023-11-19 NOTE — DIETITIAN INITIAL EVALUATION ADULT - ORAL INTAKE PTA/DIET HISTORY
Pt reports having decreased appetite/PO intake for prolonged period of time, worsening x few days PTA due to abdominal pain 2/2 colitis. UBW 135lbs per Pt with stated weight loss though unclear amount. Pt states usually skipping breakfast/lunch or having a late breakfast, eats dinner but portion size is significantly decreased, unsure time frame. Pt tolerating CLD at this time, aware plan for possible colonoscopy tomorrow.  Was The Patient On Physician Recommended Anticoagulation Therapy?: Please Select the Appropriate Response

## 2023-11-19 NOTE — PROGRESS NOTE ADULT - ASSESSMENT
38 female PMHx metastatic breast cancer on chemo/radiation (last chemo 11/1) sent in for anemia, suspect 2/2 GIB/colitis plan for colonoscopy on Mon with GI prep started.     #Acute on chronic anemia suspect 2/2 GIB  s/p 2u prbc, with appropriate response  Hg 6.3 -> 9.2 -> 8.7 -> 9.4   BM regimen as per GI   PPI QD  GI following, clear liquid diet started thereafter   Plan for colonoscopy Monday may be delayed til Tues due to prep     #Colitis  CT result noted with colitis  given Zosyn in ED, Continued at this time   f/u BCx  Pain regimen as below    #Metastatic breast cancer  c/w pain regimen with home Dilaudid 4 mg q8 PRN with additional 2 mg IV q8 for breakthrough, methadone and lyrica and BM regimen as above  Added Robaxin 11/19  oncology following, ezekiel recs  CT with possible progression of the disease in the spine noted  CT chest with Dominant left upper lobe apicoposterior segment consolidation and patchy   multifocal bilateral lung opacities, as well as nodular thickening of superior aspect of L. sided pleura likely in setting of metastasis. Patient will need further outpatient imaging on short term basis with outpatient oncology team (plan for PET in Mid December)    c/w home Namenda BID    DVT ppx: SCD given concern for bleed  Code Status: Full   Diet: clear liquid for the weekend in preparation for colonoscopy

## 2023-11-19 NOTE — PROGRESS NOTE ADULT - ASSESSMENT
37 y/o F with PMHx metastatic breast cancer on chemo/radiation presents to ED for evaluation of anemia, also with rectal bleeding, constipation and CT imaging showing possible colitis     #Anemia  #Constipation  #Colitis on CT imaging   #Rectal bleeding  EGD/colon10/20/23- antral erythema, nonbleeding hemorrhoids, no large mass lesions in colon but poor prep; path (-)   CT A/P 11/17/23- no active GIB, circumferential thickening ascending colon ? colitis, fecal load in rectosigmoid colon    - Plan for colonoscopy Monday 11/20/23. Will need multi-day prep due to constipation. Miralax prep was not given yesterday. Will give Doculax and Mag Citrate now, Golytely prep this afternoon. Goal bowel movements clear and yellow to ensure complete exam  - Clear liquid diet today. NPO after midnight  - Maintain current T&S, INR <1.5, Hgb >7.0, Plt >50 prior to procedure    - Trend CBC, transfuse as needed. Monitor for signs of bleeding.   - Avoid NSAIDs  - PPI QD for GI mucosal cytoprotection  - Oncology consult appreciated  - Recommend pain management consult   _________________________________________________________________  Assessment and recommendations are final when note is signed by the attending physician.

## 2023-11-20 LAB
ANION GAP SERPL CALC-SCNC: 8 MMOL/L — SIGNIFICANT CHANGE UP (ref 5–17)
ANION GAP SERPL CALC-SCNC: 8 MMOL/L — SIGNIFICANT CHANGE UP (ref 5–17)
BLD GP AB SCN SERPL QL: SIGNIFICANT CHANGE UP
BLD GP AB SCN SERPL QL: SIGNIFICANT CHANGE UP
BUN SERPL-MCNC: 3.5 MG/DL — LOW (ref 8–20)
BUN SERPL-MCNC: 3.5 MG/DL — LOW (ref 8–20)
CALCIUM SERPL-MCNC: 7.6 MG/DL — LOW (ref 8.4–10.5)
CALCIUM SERPL-MCNC: 7.6 MG/DL — LOW (ref 8.4–10.5)
CHLORIDE SERPL-SCNC: 107 MMOL/L — SIGNIFICANT CHANGE UP (ref 96–108)
CHLORIDE SERPL-SCNC: 107 MMOL/L — SIGNIFICANT CHANGE UP (ref 96–108)
CO2 SERPL-SCNC: 26 MMOL/L — SIGNIFICANT CHANGE UP (ref 22–29)
CO2 SERPL-SCNC: 26 MMOL/L — SIGNIFICANT CHANGE UP (ref 22–29)
CREAT SERPL-MCNC: 0.43 MG/DL — LOW (ref 0.5–1.3)
CREAT SERPL-MCNC: 0.43 MG/DL — LOW (ref 0.5–1.3)
EGFR: 128 ML/MIN/1.73M2 — SIGNIFICANT CHANGE UP
EGFR: 128 ML/MIN/1.73M2 — SIGNIFICANT CHANGE UP
GLUCOSE SERPL-MCNC: 100 MG/DL — HIGH (ref 70–99)
GLUCOSE SERPL-MCNC: 100 MG/DL — HIGH (ref 70–99)
HCG SERPL-ACNC: <4 MIU/ML — SIGNIFICANT CHANGE UP
HCG SERPL-ACNC: <4 MIU/ML — SIGNIFICANT CHANGE UP
HCT VFR BLD CALC: 28 % — LOW (ref 34.5–45)
HCT VFR BLD CALC: 28 % — LOW (ref 34.5–45)
HGB BLD-MCNC: 8.6 G/DL — LOW (ref 11.5–15.5)
HGB BLD-MCNC: 8.6 G/DL — LOW (ref 11.5–15.5)
INR BLD: 1.13 RATIO — SIGNIFICANT CHANGE UP (ref 0.85–1.18)
INR BLD: 1.13 RATIO — SIGNIFICANT CHANGE UP (ref 0.85–1.18)
MCHC RBC-ENTMCNC: 26.1 PG — LOW (ref 27–34)
MCHC RBC-ENTMCNC: 26.1 PG — LOW (ref 27–34)
MCHC RBC-ENTMCNC: 30.7 GM/DL — LOW (ref 32–36)
MCHC RBC-ENTMCNC: 30.7 GM/DL — LOW (ref 32–36)
MCV RBC AUTO: 84.8 FL — SIGNIFICANT CHANGE UP (ref 80–100)
MCV RBC AUTO: 84.8 FL — SIGNIFICANT CHANGE UP (ref 80–100)
PLATELET # BLD AUTO: 208 K/UL — SIGNIFICANT CHANGE UP (ref 150–400)
PLATELET # BLD AUTO: 208 K/UL — SIGNIFICANT CHANGE UP (ref 150–400)
POTASSIUM SERPL-MCNC: 3.4 MMOL/L — LOW (ref 3.5–5.3)
POTASSIUM SERPL-MCNC: 3.4 MMOL/L — LOW (ref 3.5–5.3)
POTASSIUM SERPL-SCNC: 3.4 MMOL/L — LOW (ref 3.5–5.3)
POTASSIUM SERPL-SCNC: 3.4 MMOL/L — LOW (ref 3.5–5.3)
PROTHROM AB SERPL-ACNC: 12.5 SEC — SIGNIFICANT CHANGE UP (ref 9.5–13)
PROTHROM AB SERPL-ACNC: 12.5 SEC — SIGNIFICANT CHANGE UP (ref 9.5–13)
RBC # BLD: 3.3 M/UL — LOW (ref 3.8–5.2)
RBC # BLD: 3.3 M/UL — LOW (ref 3.8–5.2)
RBC # FLD: 22.2 % — HIGH (ref 10.3–14.5)
RBC # FLD: 22.2 % — HIGH (ref 10.3–14.5)
SODIUM SERPL-SCNC: 141 MMOL/L — SIGNIFICANT CHANGE UP (ref 135–145)
SODIUM SERPL-SCNC: 141 MMOL/L — SIGNIFICANT CHANGE UP (ref 135–145)
WBC # BLD: 2.99 K/UL — LOW (ref 3.8–10.5)
WBC # BLD: 2.99 K/UL — LOW (ref 3.8–10.5)
WBC # FLD AUTO: 2.99 K/UL — LOW (ref 3.8–10.5)
WBC # FLD AUTO: 2.99 K/UL — LOW (ref 3.8–10.5)

## 2023-11-20 PROCEDURE — 99233 SBSQ HOSP IP/OBS HIGH 50: CPT

## 2023-11-20 PROCEDURE — 45378 DIAGNOSTIC COLONOSCOPY: CPT

## 2023-11-20 DEVICE — NAIL OSTEO 1.5X16MM STRL: Type: IMPLANTABLE DEVICE | Status: FUNCTIONAL

## 2023-11-20 RX ORDER — HYDROCORTISONE 1 %
1 OINTMENT (GRAM) TOPICAL AT BEDTIME
Refills: 0 | Status: DISCONTINUED | OUTPATIENT
Start: 2023-11-20 | End: 2023-11-21

## 2023-11-20 RX ORDER — CHLORHEXIDINE GLUCONATE 213 G/1000ML
1 SOLUTION TOPICAL
Refills: 0 | Status: DISCONTINUED | OUTPATIENT
Start: 2023-11-20 | End: 2023-11-21

## 2023-11-20 RX ADMIN — Medication 1 SUPPOSITORY(S): at 21:24

## 2023-11-20 RX ADMIN — CHLORHEXIDINE GLUCONATE 1 APPLICATION(S): 213 SOLUTION TOPICAL at 16:08

## 2023-11-20 RX ADMIN — PIPERACILLIN AND TAZOBACTAM 25 GRAM(S): 4; .5 INJECTION, POWDER, LYOPHILIZED, FOR SOLUTION INTRAVENOUS at 21:24

## 2023-11-20 RX ADMIN — HYDROMORPHONE HYDROCHLORIDE 2 MILLIGRAM(S): 2 INJECTION INTRAMUSCULAR; INTRAVENOUS; SUBCUTANEOUS at 05:20

## 2023-11-20 RX ADMIN — HYDROMORPHONE HYDROCHLORIDE 4 MILLIGRAM(S): 2 INJECTION INTRAMUSCULAR; INTRAVENOUS; SUBCUTANEOUS at 11:22

## 2023-11-20 RX ADMIN — MEMANTINE HYDROCHLORIDE 10 MILLIGRAM(S): 10 TABLET ORAL at 05:20

## 2023-11-20 RX ADMIN — Medication 150 MILLIGRAM(S): at 21:22

## 2023-11-20 RX ADMIN — HYDROMORPHONE HYDROCHLORIDE 4 MILLIGRAM(S): 2 INJECTION INTRAMUSCULAR; INTRAVENOUS; SUBCUTANEOUS at 22:15

## 2023-11-20 RX ADMIN — MEMANTINE HYDROCHLORIDE 10 MILLIGRAM(S): 10 TABLET ORAL at 18:33

## 2023-11-20 RX ADMIN — METHADONE HYDROCHLORIDE 10 MILLIGRAM(S): 40 TABLET ORAL at 05:22

## 2023-11-20 RX ADMIN — PANTOPRAZOLE SODIUM 40 MILLIGRAM(S): 20 TABLET, DELAYED RELEASE ORAL at 06:54

## 2023-11-20 RX ADMIN — METHADONE HYDROCHLORIDE 15 MILLIGRAM(S): 40 TABLET ORAL at 21:24

## 2023-11-20 RX ADMIN — Medication 150 MILLIGRAM(S): at 15:55

## 2023-11-20 RX ADMIN — METHOCARBAMOL 750 MILLIGRAM(S): 500 TABLET, FILM COATED ORAL at 05:20

## 2023-11-20 RX ADMIN — HYDROMORPHONE HYDROCHLORIDE 4 MILLIGRAM(S): 2 INJECTION INTRAMUSCULAR; INTRAVENOUS; SUBCUTANEOUS at 10:22

## 2023-11-20 RX ADMIN — METHADONE HYDROCHLORIDE 10 MILLIGRAM(S): 40 TABLET ORAL at 15:55

## 2023-11-20 RX ADMIN — HYDROMORPHONE HYDROCHLORIDE 4 MILLIGRAM(S): 2 INJECTION INTRAMUSCULAR; INTRAVENOUS; SUBCUTANEOUS at 21:24

## 2023-11-20 RX ADMIN — HYDROMORPHONE HYDROCHLORIDE 2 MILLIGRAM(S): 2 INJECTION INTRAMUSCULAR; INTRAVENOUS; SUBCUTANEOUS at 16:09

## 2023-11-20 RX ADMIN — PIPERACILLIN AND TAZOBACTAM 25 GRAM(S): 4; .5 INJECTION, POWDER, LYOPHILIZED, FOR SOLUTION INTRAVENOUS at 05:20

## 2023-11-20 RX ADMIN — HYDROMORPHONE HYDROCHLORIDE 4 MILLIGRAM(S): 2 INJECTION INTRAMUSCULAR; INTRAVENOUS; SUBCUTANEOUS at 02:00

## 2023-11-20 RX ADMIN — PIPERACILLIN AND TAZOBACTAM 25 GRAM(S): 4; .5 INJECTION, POWDER, LYOPHILIZED, FOR SOLUTION INTRAVENOUS at 15:56

## 2023-11-20 RX ADMIN — METHOCARBAMOL 750 MILLIGRAM(S): 500 TABLET, FILM COATED ORAL at 15:57

## 2023-11-20 RX ADMIN — Medication 150 MILLIGRAM(S): at 05:20

## 2023-11-20 RX ADMIN — METHOCARBAMOL 750 MILLIGRAM(S): 500 TABLET, FILM COATED ORAL at 21:22

## 2023-11-20 NOTE — PROGRESS NOTE ADULT - NS ATTEST RISK PROBLEM GEN_ALL_CORE FT
Metastatic breast cancer with concern for colitis and need for colonoscopy / blood transfusion. Discussed case with hematology/oncology and GI teams. Pain management requiring IV narcotics for metastatic bone pain / colitis.
Metastatic breast cancer with concern for colitis and need for colonoscopy / blood transfusion. Discussed case with hematology/oncology and GI teams. Pain management requiring IV narcotics for metastatic bone pain / colitis.
Metastatic breast cancer with intractable pain requiring IV narcotics, as well as concern for GIB now s/p colonoscopy with findings of large internal hemorrhoids.

## 2023-11-20 NOTE — PROGRESS NOTE ADULT - ASSESSMENT
38 female PMHx metastatic breast cancer on chemo/radiation (last chemo 11/1) sent in for anemia, suspect 2/2 GIB/colitis now s/p colonoscopy 11/20 with GI with findings of large internal hemorrhoids and recommendation for Anusol suppositories     #Acute on chronic anemia suspect 2/2 GIB  s/p 2u prbc, with appropriate response  Hg 6.3 -> 9.2 -> 8.7 -> 9.4 -> 8.6 stable   BM regimen as per GI   PPI QD  GI following, now s/p colonoscopy 11/20 with findings of large internal hemorrhoids and recommendation for Anusol Suppositories      #Colitis  CT result noted with colitis, however no signs seem to be noted on colonoscopy report will continue for now in setting of Abd pain and imaging findings on admission   Continue Zosyn   f/u BCx NGTD 48 hrs   Pain regimen as below    #Metastatic breast cancer  c/w pain regimen with home Dilaudid 4 mg q8 PRN with additional 2 mg IV q8 for breakthrough, methadone and lyrica and BM regimen as above  Added Robaxin 11/19  oncology following, ezekiel recs  CT with possible progression of the disease in the spine noted  CT chest with Dominant left upper lobe apicoposterior segment consolidation and patchy   multifocal bilateral lung opacities, as well as nodular thickening of superior aspect of L. sided pleura likely in setting of metastasis. Patient will need further outpatient imaging on short term basis with outpatient oncology team (plan for PET in Mid December)    c/w home Namenda BID    DVT ppx: SCD given concern for bleed  Code Status: Full   Diet: Will advance to regular as patient now done with colonoscopy   Dispo: likely DC in AM if pain controlled

## 2023-11-20 NOTE — PROGRESS NOTE ADULT - ASSESSMENT
37 yo F known patient of Dr Chapa at [facility], metastatic breast ca on Enhertu was in office yesterday to get IV iron found to have critical low Hgb 6.2 and was asked to come to ER,  patient to get 2u PRBC    1) Breast ca  as above  on active chemotherapy with Enhertu  plan for PET CT and mid dec and possible off label use of Keytruda  f/u with Dr Chapa upon DC    2) Anemia  hgb 8.6 today.  CT A/P - No evidence of active gastrointestinal bleeding. Circumferential thickening of the walls of the proximal to mid ascending colon with mucosal hyperenhancement and mild pericolonic fatty infiltration which may reflect a colitis  GI evaluation - recently had colonoscopy but poor bowel prep , going for repeat Colonoscopy today.  on Zosyn, cultures negative  If unrevealing the patient said that she might go for a bone marrow biopsy as an outpatient with Dr. Chapa.    3) Leukopenia  recent chemo  keep ANC > 1000    4) DVT ppx    will follow

## 2023-11-20 NOTE — PROGRESS NOTE ADULT - SUBJECTIVE AND OBJECTIVE BOX
[facility] Division of Hospital Medicine    Chief Complaint:      SUBJECTIVE / OVERNIGHT EVENTS:    Patient seen and examined at bedside, no acute events overnight, patient continues to endorse pain primarily in her back but also in her abdomen and neck. Colonoscopy performed this afternoon with findings of large internal hemorrhoids which are likely the cause of her mild bleed. Hgb remains stable this AM.     MEDICATIONS  (STANDING):  chlorhexidine 2% Cloths 1 Application(s) Topical <User Schedule>  hydrocortisone hemorrhoidal Suppository 1 Suppository(s) Rectal at bedtime  influenza   Vaccine 0.5 milliLiter(s) IntraMuscular once  memantine 10 milliGRAM(s) Oral two times a day  methadone    Tablet 15 milliGRAM(s) Oral <User Schedule>  methadone    Tablet 10 milliGRAM(s) Oral <User Schedule>  methocarbamol 750 milliGRAM(s) Oral every 8 hours  pantoprazole    Tablet 40 milliGRAM(s) Oral before breakfast  piperacillin/tazobactam IVPB.. 3.375 Gram(s) IV Intermittent every 8 hours  pregabalin 150 milliGRAM(s) Oral three times a day    MEDICATIONS  (PRN):  acetaminophen     Tablet .. 650 milliGRAM(s) Oral every 6 hours PRN Temp greater or equal to 38C (100.4F), Mild Pain (1 - 3)  aluminum hydroxide/magnesium hydroxide/simethicone Suspension 30 milliLiter(s) Oral every 4 hours PRN Dyspepsia  artificial  tears Solution 1 Drop(s) Both EYES every 3 hours PRN Dry Eyes  HYDROmorphone   Tablet 4 milliGRAM(s) Oral every 8 hours PRN for severe pain  HYDROmorphone  Injectable 2 milliGRAM(s) IV Push every 8 hours PRN Breakthrough Pain  LORazepam     Tablet 0.5 milliGRAM(s) Oral two times a day PRN for anxiety  melatonin 3 milliGRAM(s) Oral at bedtime PRN Insomnia  ondansetron Injectable 4 milliGRAM(s) IV Push every 8 hours PRN Nausea and/or Vomiting        I&O's Summary    19 Nov 2023 07:01  -  20 Nov 2023 07:00  --------------------------------------------------------  IN: 396 mL / OUT: 0 mL / NET: 396 mL        PHYSICAL EXAM:  Vital Signs Last 24 Hrs  T(C): 36.6 (20 Nov 2023 15:26), Max: 36.8 (20 Nov 2023 10:59)  T(F): 97.9 (20 Nov 2023 15:26), Max: 98.3 (20 Nov 2023 10:59)  HR: 85 (20 Nov 2023 15:26) (71 - 85)  BP: 114/67 (20 Nov 2023 15:26) (94/61 - 114/67)  BP(mean): --  RR: 16 (20 Nov 2023 15:26) (16 - 18)  SpO2: 96% (20 Nov 2023 15:26) (94% - 98%)    Parameters below as of 20 Nov 2023 15:26  Patient On (Oxygen Delivery Method): room air        GENERAL: NAD, lying in bed comfortably  HEAD:  Atraumatic, Normocephalic  CHEST/LUNG: Clear to auscultation bilaterally; Unlabored respirations R. chest port noted   HEART: Regular rate and rhythm; no LE edema  ABDOMEN: BSx4; Soft, LUQ and LLQ abd pain on palpation  NERVOUS SYSTEM:  A&Ox3, no focal deficits   SKIN: No rashes or lesions  PSYCH: Normal affect, euthymic mood    LABS:                        8.6    2.99  )-----------( 208      ( 20 Nov 2023 07:15 )             28.0     11-20    141  |  107  |  3.5<L>  ----------------------------<  100<H>  3.4<L>   |  26.0  |  0.43<L>    Ca    7.6<L>      20 Nov 2023 07:15    TPro  5.6<L>  /  Alb  3.4  /  TBili  1.0  /  DBili  x   /  AST  34<H>  /  ALT  23  /  AlkPhos  110  11-19    PT/INR - ( 20 Nov 2023 07:15 )   PT: 12.5 sec;   INR: 1.13 ratio               Urinalysis Basic - ( 20 Nov 2023 07:15 )    Color: x / Appearance: x / SG: x / pH: x  Gluc: 100 mg/dL / Ketone: x  / Bili: x / Urobili: x   Blood: x / Protein: x / Nitrite: x   Leuk Esterase: x / RBC: x / WBC x   Sq Epi: x / Non Sq Epi: x / Bacteria: x        CAPILLARY BLOOD GLUCOSE            RADIOLOGY & ADDITIONAL TESTS:  Results Reviewed:   Imaging Personally Reviewed:  Electrocardiogram Personally Reviewed:

## 2023-11-20 NOTE — PROGRESS NOTE ADULT - SUBJECTIVE AND OBJECTIVE BOX
39 yo F known patient of Dr Chpaa at [facility], metastatic breast ca on Enhertu was in office yesterday to get IV iron found to have critical low Hgb 6.2 and was asked to come to ER,  patient to get 2u PRBC , CT a/p and GI evaluation    11/20  seen at bedside, afebrile, no bleeding  Hgb 8.6. Going for colonoscopy today.  She is frustrated over her overall condition and ongoing symptoms/sob/fatigue.      MEDICATIONS  (STANDING):  influenza   Vaccine 0.5 milliLiter(s) IntraMuscular once  memantine 10 milliGRAM(s) Oral two times a day  methadone    Tablet 15 milliGRAM(s) Oral <User Schedule>  methadone    Tablet 10 milliGRAM(s) Oral <User Schedule>  methocarbamol 750 milliGRAM(s) Oral every 8 hours  pantoprazole    Tablet 40 milliGRAM(s) Oral before breakfast  piperacillin/tazobactam IVPB.. 3.375 Gram(s) IV Intermittent every 8 hours  pregabalin 150 milliGRAM(s) Oral three times a day    MEDICATIONS  (PRN):  acetaminophen     Tablet .. 650 milliGRAM(s) Oral every 6 hours PRN Temp greater or equal to 38C (100.4F), Mild Pain (1 - 3)  aluminum hydroxide/magnesium hydroxide/simethicone Suspension 30 milliLiter(s) Oral every 4 hours PRN Dyspepsia  artificial  tears Solution 1 Drop(s) Both EYES every 3 hours PRN Dry Eyes  HYDROmorphone   Tablet 4 milliGRAM(s) Oral every 8 hours PRN for severe pain  HYDROmorphone  Injectable 2 milliGRAM(s) IV Push every 8 hours PRN Breakthrough Pain  LORazepam     Tablet 0.5 milliGRAM(s) Oral two times a day PRN for anxiety  melatonin 3 milliGRAM(s) Oral at bedtime PRN Insomnia  ondansetron Injectable 4 milliGRAM(s) IV Push every 8 hours PRN Nausea and/or Vomiting    Vital Signs Last 24 Hrs  T(C): 36.8 (20 Nov 2023 10:59), Max: 36.8 (20 Nov 2023 10:59)  T(F): 98.3 (20 Nov 2023 10:59), Max: 98.3 (20 Nov 2023 10:59)  HR: 71 (20 Nov 2023 10:59) (64 - 79)  BP: 94/61 (20 Nov 2023 10:59) (94/61 - 115/71)  BP(mean): --  RR: 18 (20 Nov 2023 10:59) (16 - 18)  SpO2: 94% (20 Nov 2023 10:59) (94% - 98%)    Parameters below as of 19 Nov 2023 12:29  Patient On (Oxygen Delivery Method): room air    Gen - alert and oriented  Heart - s1s2 RRR  Lung - CTA b/l  Abd - mild tender  Ext no edema      Labs:                          8.6    2.99  )-----------( 208      ( 20 Nov 2023 07:15 )             28.0                           6.3    3.22  )-----------( 294      ( 17 Nov 2023 00:35 )             20.1     11-20    141  |  107  |  3.5<L>  ----------------------------<  100<H>  3.4<L>   |  26.0  |  0.43<L>    Ca    7.6<L>      20 Nov 2023 07:15    TPro  5.6<L>  /  Alb  3.4  /  TBili  1.0  /  DBili  x   /  AST  34<H>  /  ALT  23  /  AlkPhos  110  11-19 11-17    144  |  110<H>  |  7.5<L>  ----------------------------<  81  3.2<L>   |  24.0  |  0.43<L>    Ca    7.8<L>      17 Nov 2023 00:35    TPro  5.4<L>  /  Alb  3.3  /  TBili  0.7  /  DBili  x   /  AST  29  /  ALT  20  /  AlkPhos  101  11-17

## 2023-11-21 ENCOUNTER — TRANSCRIPTION ENCOUNTER (OUTPATIENT)
Age: 38
End: 2023-11-21

## 2023-11-21 VITALS
DIASTOLIC BLOOD PRESSURE: 74 MMHG | HEART RATE: 80 BPM | RESPIRATION RATE: 18 BRPM | OXYGEN SATURATION: 96 % | TEMPERATURE: 98 F | SYSTOLIC BLOOD PRESSURE: 108 MMHG

## 2023-11-21 LAB
ANION GAP SERPL CALC-SCNC: 10 MMOL/L — SIGNIFICANT CHANGE UP (ref 5–17)
ANION GAP SERPL CALC-SCNC: 10 MMOL/L — SIGNIFICANT CHANGE UP (ref 5–17)
BUN SERPL-MCNC: 5 MG/DL — LOW (ref 8–20)
BUN SERPL-MCNC: 5 MG/DL — LOW (ref 8–20)
CALCIUM SERPL-MCNC: 8.1 MG/DL — LOW (ref 8.4–10.5)
CALCIUM SERPL-MCNC: 8.1 MG/DL — LOW (ref 8.4–10.5)
CHLORIDE SERPL-SCNC: 107 MMOL/L — SIGNIFICANT CHANGE UP (ref 96–108)
CHLORIDE SERPL-SCNC: 107 MMOL/L — SIGNIFICANT CHANGE UP (ref 96–108)
CO2 SERPL-SCNC: 24 MMOL/L — SIGNIFICANT CHANGE UP (ref 22–29)
CO2 SERPL-SCNC: 24 MMOL/L — SIGNIFICANT CHANGE UP (ref 22–29)
CREAT SERPL-MCNC: 0.49 MG/DL — LOW (ref 0.5–1.3)
CREAT SERPL-MCNC: 0.49 MG/DL — LOW (ref 0.5–1.3)
EGFR: 124 ML/MIN/1.73M2 — SIGNIFICANT CHANGE UP
EGFR: 124 ML/MIN/1.73M2 — SIGNIFICANT CHANGE UP
GLUCOSE SERPL-MCNC: 115 MG/DL — HIGH (ref 70–99)
GLUCOSE SERPL-MCNC: 115 MG/DL — HIGH (ref 70–99)
HCT VFR BLD CALC: 27.8 % — LOW (ref 34.5–45)
HCT VFR BLD CALC: 27.8 % — LOW (ref 34.5–45)
HGB BLD-MCNC: 8.3 G/DL — LOW (ref 11.5–15.5)
HGB BLD-MCNC: 8.3 G/DL — LOW (ref 11.5–15.5)
MCHC RBC-ENTMCNC: 25.5 PG — LOW (ref 27–34)
MCHC RBC-ENTMCNC: 25.5 PG — LOW (ref 27–34)
MCHC RBC-ENTMCNC: 29.9 GM/DL — LOW (ref 32–36)
MCHC RBC-ENTMCNC: 29.9 GM/DL — LOW (ref 32–36)
MCV RBC AUTO: 85.3 FL — SIGNIFICANT CHANGE UP (ref 80–100)
MCV RBC AUTO: 85.3 FL — SIGNIFICANT CHANGE UP (ref 80–100)
MRSA PCR RESULT.: SIGNIFICANT CHANGE UP
MRSA PCR RESULT.: SIGNIFICANT CHANGE UP
PLATELET # BLD AUTO: 221 K/UL — SIGNIFICANT CHANGE UP (ref 150–400)
PLATELET # BLD AUTO: 221 K/UL — SIGNIFICANT CHANGE UP (ref 150–400)
POTASSIUM SERPL-MCNC: 3.9 MMOL/L — SIGNIFICANT CHANGE UP (ref 3.5–5.3)
POTASSIUM SERPL-MCNC: 3.9 MMOL/L — SIGNIFICANT CHANGE UP (ref 3.5–5.3)
POTASSIUM SERPL-SCNC: 3.9 MMOL/L — SIGNIFICANT CHANGE UP (ref 3.5–5.3)
POTASSIUM SERPL-SCNC: 3.9 MMOL/L — SIGNIFICANT CHANGE UP (ref 3.5–5.3)
RBC # BLD: 3.26 M/UL — LOW (ref 3.8–5.2)
RBC # BLD: 3.26 M/UL — LOW (ref 3.8–5.2)
RBC # FLD: 22.7 % — HIGH (ref 10.3–14.5)
RBC # FLD: 22.7 % — HIGH (ref 10.3–14.5)
S AUREUS DNA NOSE QL NAA+PROBE: SIGNIFICANT CHANGE UP
S AUREUS DNA NOSE QL NAA+PROBE: SIGNIFICANT CHANGE UP
SODIUM SERPL-SCNC: 141 MMOL/L — SIGNIFICANT CHANGE UP (ref 135–145)
SODIUM SERPL-SCNC: 141 MMOL/L — SIGNIFICANT CHANGE UP (ref 135–145)
WBC # BLD: 2.83 K/UL — LOW (ref 3.8–10.5)
WBC # BLD: 2.83 K/UL — LOW (ref 3.8–10.5)
WBC # FLD AUTO: 2.83 K/UL — LOW (ref 3.8–10.5)
WBC # FLD AUTO: 2.83 K/UL — LOW (ref 3.8–10.5)

## 2023-11-21 PROCEDURE — 86923 COMPATIBILITY TEST ELECTRIC: CPT

## 2023-11-21 PROCEDURE — 84100 ASSAY OF PHOSPHORUS: CPT

## 2023-11-21 PROCEDURE — 96376 TX/PRO/DX INJ SAME DRUG ADON: CPT

## 2023-11-21 PROCEDURE — 71250 CT THORAX DX C-: CPT

## 2023-11-21 PROCEDURE — 80053 COMPREHEN METABOLIC PANEL: CPT

## 2023-11-21 PROCEDURE — 36415 COLL VENOUS BLD VENIPUNCTURE: CPT

## 2023-11-21 PROCEDURE — 85027 COMPLETE CBC AUTOMATED: CPT

## 2023-11-21 PROCEDURE — 99239 HOSP IP/OBS DSCHRG MGMT >30: CPT

## 2023-11-21 PROCEDURE — P9016: CPT

## 2023-11-21 PROCEDURE — 74178 CT ABD&PLV WO CNTR FLWD CNTR: CPT | Mod: MA

## 2023-11-21 PROCEDURE — 86900 BLOOD TYPING SEROLOGIC ABO: CPT

## 2023-11-21 PROCEDURE — 86850 RBC ANTIBODY SCREEN: CPT

## 2023-11-21 PROCEDURE — 85025 COMPLETE CBC W/AUTO DIFF WBC: CPT

## 2023-11-21 PROCEDURE — 87040 BLOOD CULTURE FOR BACTERIA: CPT

## 2023-11-21 PROCEDURE — 96374 THER/PROPH/DIAG INJ IV PUSH: CPT

## 2023-11-21 PROCEDURE — 96375 TX/PRO/DX INJ NEW DRUG ADDON: CPT

## 2023-11-21 PROCEDURE — 85610 PROTHROMBIN TIME: CPT

## 2023-11-21 PROCEDURE — 83735 ASSAY OF MAGNESIUM: CPT

## 2023-11-21 PROCEDURE — 84702 CHORIONIC GONADOTROPIN TEST: CPT

## 2023-11-21 PROCEDURE — 99285 EMERGENCY DEPT VISIT HI MDM: CPT

## 2023-11-21 PROCEDURE — 85730 THROMBOPLASTIN TIME PARTIAL: CPT

## 2023-11-21 PROCEDURE — 80048 BASIC METABOLIC PNL TOTAL CA: CPT

## 2023-11-21 PROCEDURE — 86901 BLOOD TYPING SEROLOGIC RH(D): CPT

## 2023-11-21 PROCEDURE — 99232 SBSQ HOSP IP/OBS MODERATE 35: CPT

## 2023-11-21 PROCEDURE — 87640 STAPH A DNA AMP PROBE: CPT

## 2023-11-21 PROCEDURE — 87641 MR-STAPH DNA AMP PROBE: CPT

## 2023-11-21 PROCEDURE — 36430 TRANSFUSION BLD/BLD COMPNT: CPT

## 2023-11-21 RX ORDER — METRONIDAZOLE 500 MG
1 TABLET ORAL
Qty: 12 | Refills: 0
Start: 2023-11-21 | End: 2023-11-24

## 2023-11-21 RX ORDER — CIPROFLOXACIN LACTATE 400MG/40ML
1 VIAL (ML) INTRAVENOUS
Qty: 8 | Refills: 0
Start: 2023-11-21 | End: 2023-11-24

## 2023-11-21 RX ORDER — HYDROCORTISONE 1 %
1 OINTMENT (GRAM) TOPICAL
Qty: 1 | Refills: 0
Start: 2023-11-21 | End: 2023-12-20

## 2023-11-21 RX ORDER — PANTOPRAZOLE SODIUM 20 MG/1
1 TABLET, DELAYED RELEASE ORAL
Qty: 30 | Refills: 0
Start: 2023-11-21 | End: 2023-12-20

## 2023-11-21 RX ADMIN — HYDROMORPHONE HYDROCHLORIDE 2 MILLIGRAM(S): 2 INJECTION INTRAMUSCULAR; INTRAVENOUS; SUBCUTANEOUS at 04:00

## 2023-11-21 RX ADMIN — PIPERACILLIN AND TAZOBACTAM 25 GRAM(S): 4; .5 INJECTION, POWDER, LYOPHILIZED, FOR SOLUTION INTRAVENOUS at 05:20

## 2023-11-21 RX ADMIN — MEMANTINE HYDROCHLORIDE 10 MILLIGRAM(S): 10 TABLET ORAL at 05:20

## 2023-11-21 RX ADMIN — Medication 300 UNIT(S): at 12:48

## 2023-11-21 RX ADMIN — Medication 150 MILLIGRAM(S): at 05:20

## 2023-11-21 RX ADMIN — HYDROMORPHONE HYDROCHLORIDE 4 MILLIGRAM(S): 2 INJECTION INTRAMUSCULAR; INTRAVENOUS; SUBCUTANEOUS at 10:08

## 2023-11-21 RX ADMIN — HYDROMORPHONE HYDROCHLORIDE 4 MILLIGRAM(S): 2 INJECTION INTRAMUSCULAR; INTRAVENOUS; SUBCUTANEOUS at 11:08

## 2023-11-21 RX ADMIN — CHLORHEXIDINE GLUCONATE 1 APPLICATION(S): 213 SOLUTION TOPICAL at 05:21

## 2023-11-21 RX ADMIN — HYDROMORPHONE HYDROCHLORIDE 2 MILLIGRAM(S): 2 INJECTION INTRAMUSCULAR; INTRAVENOUS; SUBCUTANEOUS at 03:25

## 2023-11-21 RX ADMIN — Medication 0.5 MILLIGRAM(S): at 08:15

## 2023-11-21 RX ADMIN — METHADONE HYDROCHLORIDE 10 MILLIGRAM(S): 40 TABLET ORAL at 05:20

## 2023-11-21 RX ADMIN — PANTOPRAZOLE SODIUM 40 MILLIGRAM(S): 20 TABLET, DELAYED RELEASE ORAL at 05:19

## 2023-11-21 RX ADMIN — METHOCARBAMOL 750 MILLIGRAM(S): 500 TABLET, FILM COATED ORAL at 05:20

## 2023-11-21 NOTE — DISCHARGE NOTE PROVIDER - PROVIDER TOKENS
PROVIDER:[TOKEN:[[id number]],FOLLOWUP:[2 weeks]],PROVIDER:[TOKEN:[[id number]],FOLLOWUP:[1 week]]

## 2023-11-21 NOTE — DISCHARGE NOTE PROVIDER - HOSPITAL COURSE
HPI 11/17: Mrs. Dave is a 37 yo female PMHx significant for metastatic breast cancer on chemo/radiation (last chemo 11/1) sent in for anemia. Pt had outpatient labs prior to admission and found to have Hg down to 6.2 and came back to the ED for further eval. Reports chronic BRBPR, was at [facility] before and had a colonoscopy, but it was a poor prep and then discharged to have outpatient followup.   During Hospitalization CT Chest had shown Dominant left upper lobe apicoposterior segment consolidation and patchy multifocal bilateral lung opacities. CT Abd showed no active GI bleeding, however did show Circumferential thickening of the walls of the proximal to mid ascending colon with mucosal hyperenhancement and mild pericolonic fatty infiltration possibly representing a colitis, moderate fecal load noted as well. Patient was kept on Zosyn and GI team was cnosulted for evaluation. Patient had been given 2 U PRBC on admission with appropriate increase in Hgb to 9.4, and has remained stable throughout admission. Taken for colonoscopy 11/20 with findings of large grade 3 internal hemorrhoids, and was technically an inadequate prep however no large lesions were identified. As such GI recommended patient can be DC'd with GI follow up for potential Capsule endoscopy or repeat elective colonoscopy with adequate prep. Suggestion of outpatient colorectal eval for internal hemorrhoids as well. Seen by Heme/Onc with no new changes in regiment, patient to follow with Dr. Chapa on 11/27. Patient is currently medically stable for discharge home with Heme/onc and GI follow up. Will complete 7 days of Abx therapy for possible colitis in setting of inadequate prep for colonoscopy and imaging findings with mild Abd pain persistent.

## 2023-11-21 NOTE — DISCHARGE NOTE NURSING/CASE MANAGEMENT/SOCIAL WORK - PATIENT PORTAL LINK FT
You can access the FollowMyHealth Patient Portal offered by [facility] by registering at the following website: [email or web address]. By joining [employer]’s FollowMyHealth portal, you will also be able to view your health information using other applications (apps) compatible with our system.

## 2023-11-21 NOTE — DISCHARGE NOTE PROVIDER - CARE PROVIDER_API CALL
Hermes Rosado  Gastroenterology  [address], [address]  [place] [address]  Phone: [phone]  Fax: [phone]  Follow Up Time: 2 weeks    Angie Chapa  Medical Oncology  [address] [place] [address]  Phone: [phone]  Fax: [phone]  Follow Up Time: 1 week

## 2023-11-21 NOTE — DISCHARGE NOTE PROVIDER - ATTENDING DISCHARGE PHYSICAL EXAMINATION:
T(C): 36.7 (11-21-23 @ 10:06), Max: 36.9 (11-20-23 @ 17:22)  HR: 75 (11-21-23 @ 10:06) (73 - 90)  BP: 106/68 (11-21-23 @ 10:06) (95/59 - 114/67)  RR: 18 (11-21-23 @ 10:06) (16 - 18)  SpO2: 99% (11-21-23 @ 10:06) (96% - 99%)    GENERAL: NAD, lying in bed comfortably  HEAD:  Atraumatic, Normocephalic  CHEST/LUNG: Clear to auscultation bilaterally; Unlabored respirations R. chest port noted   HEART: Regular rate and rhythm; no LE edema  ABDOMEN: BSx4; Soft, LUQ and LLQ abd pain on palpation  NERVOUS SYSTEM:  A&Ox3, no focal deficits   SKIN: No rashes or lesions  PSYCH: Normal affect, euthymic mood

## 2023-11-21 NOTE — DISCHARGE NOTE PROVIDER - NSDCMRMEDTOKEN_GEN_ALL_CORE_FT
Ativan 0.5 mg oral tablet: 1 tab(s) orally 2 times a day as needed for  anxiety  Dilaudid 4 mg oral tablet: 1 tab(s) orally every 8 hours as needed for  severe pain  Lyrica 150 mg oral capsule: 1 cap(s) orally 3 times a day hold if lethargic and do not take it with narcotics at the same time  methadone 5 mg oral tablet: 2 tablets at 6AM, 2 tablets at 2PM, 3 tablets at 10PM  Namenda 10 mg oral tablet: 1 tab(s) orally 2 times a day   Anusol-HC 25 mg rectal suppository: 1 suppository(ies) rectal once a day (at bedtime)  Ativan 0.5 mg oral tablet: 1 tab(s) orally 2 times a day as needed for  anxiety  ciprofloxacin 500 mg oral tablet: 1 tab(s) orally 2 times a day  Dilaudid 4 mg oral tablet: 1 tab(s) orally every 8 hours as needed for  severe pain  Lyrica 150 mg oral capsule: 1 cap(s) orally 3 times a day hold if lethargic and do not take it with narcotics at the same time  methadone 5 mg oral tablet: orally 3 times a day 2 tablets at 6AM, 2 tablets at 2PM, 3 tablets at 10PM  metroNIDAZOLE 500 mg oral tablet: 1 tab(s) orally 3 times a day  Namenda 10 mg oral tablet: 1 tab(s) orally 2 times a day  Protonix 40 mg oral delayed release tablet: 1 tab(s) orally once a day (before a meal)

## 2023-11-21 NOTE — PROGRESS NOTE ADULT - ASSESSMENT
Ms. Dave is a 38 year old woman with significant past medical history of metastatic breast cancer on chemo/RT who presented with rectal bleeding and anemia (hemoglobin 6.5), constipation and CTA without bleeding but evidence suggestive of colitis. Now s/p EGD/Colonoscopy remarkable for gastric erythema (likely gastritis), no active hemorrhage, non-bleeding (grade 3) internal hemorrhoids without obvious lesion however poor bowel preparation noted. Imaging and labs personally reviewed. Discussed plan with patient and covering provider. All questions answered and concerns addressed.     Anemia  Chronic constipation   Abnormal CT (colitis)   Rectal bleeding (internal hemorrhoids)     Plan:   - Would discontinue Senna (tachyphylaxis / dependence).   - Start Miralax BID (can titrate to maintain soft bowel movements)  - Encourage adequate hydration.    - Can supplement fiber / metamucil daily.   - Can continue Anusol PRN.   - Can continue PPI to PO daily.   - Inadequate bowel preparation although no large lesion identified.   - Consider repeat Colonoscopy with adequate bowel preparation outpatient.   - Possible video capsule endoscopy if alternative source not identified.    - Hematology / Oncology follow up.   - Avoid opiate analgesia given chronic delayed colonic motility.   - Consider outpatient Colorectal surgery consultation for Grade 3 internal hemorrhoids.   Please do not hesitate to contact GI service for additional questions or concerns

## 2023-11-21 NOTE — PROGRESS NOTE ADULT - PROVIDER SPECIALTY LIST ADULT
Heme/Onc
Gastroenterology
Gastroenterology
Heme/Onc
Hospitalist
Gastroenterology
Hospitalist
Hospitalist

## 2023-11-21 NOTE — PROGRESS NOTE ADULT - SUBJECTIVE AND OBJECTIVE BOX
Gastroenterology Progress Note   2023    Chief Complaint:  Patient is a 38y old  Female who presents with a chief complaint of Anemia (2023 07:46)    HPI/ 24 hr events:   Patient seen and examined at bedside.   Feeling improved post procedure.   Denies nausea, vomiting, diarrhea, hematemesis, hematochezia, melena.   Vitals are overall stable.    REVIEW OF SYSTEMS:   General: Negative  HEENT: Negative  CV: Negative  Respiratory: Negative  GI: See HPI  : Negative  MSK: Negative  Hematologic: Negative  Skin: Negative    MEDICATIONS:   MEDICATIONS  (STANDING):  chlorhexidine 2% Cloths 1 Application(s) Topical <User Schedule>  hydrocortisone hemorrhoidal Suppository 1 Suppository(s) Rectal at bedtime  influenza   Vaccine 0.5 milliLiter(s) IntraMuscular once  memantine 10 milliGRAM(s) Oral two times a day  methadone    Tablet 15 milliGRAM(s) Oral <User Schedule>  methadone    Tablet 10 milliGRAM(s) Oral <User Schedule>  methocarbamol 750 milliGRAM(s) Oral every 8 hours  pantoprazole    Tablet 40 milliGRAM(s) Oral before breakfast  piperacillin/tazobactam IVPB.. 3.375 Gram(s) IV Intermittent every 8 hours  pregabalin 150 milliGRAM(s) Oral three times a day    MEDICATIONS  (PRN):  acetaminophen     Tablet .. 650 milliGRAM(s) Oral every 6 hours PRN Temp greater or equal to 38C (100.4F), Mild Pain (1 - 3)  aluminum hydroxide/magnesium hydroxide/simethicone Suspension 30 milliLiter(s) Oral every 4 hours PRN Dyspepsia  artificial  tears Solution 1 Drop(s) Both EYES every 3 hours PRN Dry Eyes  HYDROmorphone   Tablet 4 milliGRAM(s) Oral every 8 hours PRN for severe pain  HYDROmorphone  Injectable 2 milliGRAM(s) IV Push every 8 hours PRN Breakthrough Pain  LORazepam     Tablet 0.5 milliGRAM(s) Oral two times a day PRN for anxiety  melatonin 3 milliGRAM(s) Oral at bedtime PRN Insomnia  ondansetron Injectable 4 milliGRAM(s) IV Push every 8 hours PRN Nausea and/or Vomiting    Packed Red Cells Order:  1 Unit  Indication: Hgb <7 gm/dL  Infuse Unit : 3 Hours (23 @ 01:31)  Packed Red Cells Order:  1 Unit  Indication: Hgb <7 gm/dL  Infuse Unit : 3 Hours (23 @ 01:31)    DIET:  Diet, Regular (23 @ 14:59) [Active]      ALLERGIES:   Allergies  pertuzumab (Other (Severe))  Perjeta (Other (Severe))    Intolerances    VITAL SIGNS:   Vital Signs Last 24 Hrs  T(C): 36.7 (2023 08:13), Max: 36.9 (2023 17:22)  T(F): 98.1 (2023 08:13), Max: 98.5 (2023 17:22)  HR: 73 (2023 08:26) (71 - 90)  BP: 109/70 (2023 08:13) (94/61 - 114/67)  BP(mean): --  RR: 18 (2023 08:13) (16 - 18)  SpO2: 98% (2023 08:13) (94% - 99%)    Parameters below as of 2023 08:13  Patient On (Oxygen Delivery Method): room air    I&O's Summary    PHYSICAL EXAM:   GENERAL:  No acute distress  HEENT:  NC/AT, conjunctiva clear, sclera anicteric  CHEST:  No increased effort  HEART:  Regular rate  ABDOMEN:  Soft, non-tender, non-distended, normoactive bowel sounds, no rebound or guarding  EXTREMITIES: No edema  SKIN:  Warm, dry  NEURO:  Calm, cooperative    LABS:                        8.3    2.83  )-----------( 221      ( 2023 05:53 )             27.8     Hemoglobin: 8.3 g/dL (23 @ 05:53)  Hemoglobin: 8.6 g/dL (23 @ 07:15)  Hemoglobin: 9.4 g/dL (23 @ 06:25)        141  |  107  |  5.0<L>  ----------------------------<  115<H>  3.9   |  24.0  |  0.49<L>    Ca    8.1<L>      2023 05:53    PT/INR - ( 2023 07:15 )   PT: 12.5 sec;   INR: 1.13 ratio      RADIOLOGY & ADDITIONAL STUDIES:    Colonoscopy Report  Date: 2023  Patient Name: NATIVIDAD MYERS  MRN: [id number]  Account Number: [id number]  Gender: Female   (age): 1985 (38)  Instrument(s): PCF [id number] ([id number])  Attending/Fellow: Hermes Rosado MD    Procedure Room #:  PROCEDURE ROOM 1  Referring Physician:  MADIE LYN  [address] [place] [address] [phone] (phone)    ASA Class:    P3 - 2023 4:39 PM Hermes Rosado MD    History of Present Illness:  The patient presents for colonoscopic evaluation of  rectal bleeding.    Administered Medications:  As per Anesthesiology Record  Indications: Rectal bleedin.3 - K62.5    Procedure:  undergoing a diagnostic colonoscopy. Prior colonoscopy was performed never ago.    The procedure, indications, preparation and potential complications were    explained to the patient, who indicated understanding and signed the    corresponding consent forms. MAC was administered by the anesthesiologist.    Continuous pulse oximetry and blood pressure monitoring were used throughout the    procedure. Supplemental oxygen was used. The quality of preparation was 6-9 on    the BBP scale/adequate. Patient was placed in left lateral decubitus position.    Digital exam was normal. The colonoscope was introduced through the rectum and    advanced under direct visualization until cecum was reached. The appendiceal    orifice and the ileocecal valve were identified. Careful visualization was    performed as the instrument was withdrawn. Patient tolerance to procedure was    excellent. The procedure was not difficult.    [place] Bowel Preparation Score:    Using the [place] Bowel Preparation Score, each segment of the colon was graded    as follows:    Left Colon: Good, 2 | Transverse Colon: Good, 2  | Right Colon: Good, 2    Limitations/Complications:    There were no apparent limitations or complications    Findings:  Protruding lesions Large internal hemorrhoids were noted.  Additional findings The colon was otherwise unremarkable.    Impressions:   Internal hemorrhoids.  The colon was otherwise unremarkable.    Plan:  Anusol suppositories  Hermes Rosado MD  Version 1, Electronically signed on 2023 4:50:59 PM by Hermes Rosado MD

## 2023-11-21 NOTE — PROGRESS NOTE ADULT - ASSESSMENT
39 yo F known patient of Dr Chapa at [facility], metastatic breast ca on Enhertu was in office yesterday to get IV iron found to have critical low Hgb 6.2 and was asked to come to ER,  patient to get 2u PRBC    1) Breast ca  as above  on active chemotherapy with Enhertu  plan for PET CT and mid dec and possible off label use of Keytruda  f/u with Dr Chapa upon DC    2) Anemia  hgb 8.3 today from 8.6 yesterday   CT A/P - No evidence of active gastrointestinal bleeding. Circumferential thickening of the walls of the proximal to mid ascending colon with mucosal hyperenhancement and mild pericolonic fatty infiltration which may reflect a colitis  s/p EGD/Colonoscopy remarkable for gastric erythema (likely gastritis), no active hemorrhage, non-bleeding (grade 3) internal hemorrhoids without obvious lesion however poor bowel preparation noted.  GI on board and recommend possible repeat colonposcopy given the poor prep but no large lesions identified and possible VEG, and outpt colorectal surgery eval for grade 3 internal hemorrhoid  on Zosyn, cultures negative  If unrevealing the patient said that she might go for a bone marrow biopsy as an outpatient with Dr. Chapa.    3) Leukopenia  recent chemo  keep ANC > 1000    4) DVT ppx    will follow 37 yo F known patient of Dr Chapa at [facility], metastatic breast ca on Enhertu was in office yesterday to get IV iron found to have critical low Hgb 6.2 and was asked to come to ER,  patient to get 2u PRBC    1) Breast ca  as above  on active chemotherapy with Enhertu  plan for PET CT and mid dec and possible off label use of Keytruda  f/u with Dr Chapa upon DC    2) Anemia  hgb 8.3 today from 8.6 yesterday   CT A/P - No evidence of active gastrointestinal bleeding. Circumferential thickening of the walls of the proximal to mid ascending colon with mucosal hyperenhancement and mild pericolonic fatty infiltration which may reflect a colitis  s/p EGD/Colonoscopy remarkable for gastric erythema (likely gastritis), no active hemorrhage, non-bleeding (grade 3) internal hemorrhoids without obvious lesion however poor bowel preparation noted.  GI on board and recommend possible repeat colonposcopy given the poor prep but no large lesions identified and possible VEG, and outpt colorectal surgery eval for grade 3 internal hemorrhoid  on Zosyn, cultures negative  If unrevealing the patient said that she might go for a bone marrow biopsy as an outpatient with Dr. Chapa.  pt for dc today and has appointment with oncologist on 11/27/23    3) Leukopenia  recent chemo  keep ANC > 1000    4) DVT ppx    will follow

## 2023-11-21 NOTE — PROGRESS NOTE ADULT - SUBJECTIVE AND OBJECTIVE BOX
s/p EGD/Colonoscopy remarkable for gastric erythema (likely gastritis), no active hemorrhage, non-bleeding (grade 3) internal hemorrhoids without obvious lesion however poor bowel preparation noted.  No acute    Vital Signs Last 24 Hrs  T(C): 36.7 (21 Nov 2023 10:06), Max: 36.9 (20 Nov 2023 17:22)  T(F): 98 (21 Nov 2023 10:06), Max: 98.5 (20 Nov 2023 17:22)  HR: 75 (21 Nov 2023 10:06) (73 - 90)  BP: 106/68 (21 Nov 2023 10:06) (95/59 - 114/67)  BP(mean): --  RR: 18 (21 Nov 2023 10:06) (16 - 18)  SpO2: 99% (21 Nov 2023 10:06) (96% - 99%)  Parameters below as of 21 Nov 2023 10:06  Patient On (Oxygen Delivery Method): room air    in NAD  rrr  Abd soft, non distended  No pedal edema    MEDICATIONS  (STANDING):  chlorhexidine 2% Cloths 1 Application(s) Topical <User Schedule>  hydrocortisone hemorrhoidal Suppository 1 Suppository(s) Rectal at bedtime  influenza   Vaccine 0.5 milliLiter(s) IntraMuscular once  memantine 10 milliGRAM(s) Oral two times a day  methadone    Tablet 15 milliGRAM(s) Oral <User Schedule>  methadone    Tablet 10 milliGRAM(s) Oral <User Schedule>  methocarbamol 750 milliGRAM(s) Oral every 8 hours  pantoprazole    Tablet 40 milliGRAM(s) Oral before breakfast  piperacillin/tazobactam IVPB.. 3.375 Gram(s) IV Intermittent every 8 hours  pregabalin 150 milliGRAM(s) Oral three times a day    CBC:            8.3    2.83  )-----------( 221      ( 11-21-23 @ 05:53 )             27.8     Chem:         ( 11-21-23 @ 05:53 )    141  |  107  |  5.0<L>  ----------------------------<  115<H>  3.9   |  24.0  |  0.49<L>   s/p EGD/Colonoscopy remarkable for gastric erythema (likely gastritis), no active hemorrhage, non-bleeding (grade 3) internal hemorrhoids without obvious lesion however poor bowel preparation noted.  No acute ON events.  Pt is for dc later today    Vital Signs Last 24 Hrs  T(C): 36.7 (21 Nov 2023 10:06), Max: 36.9 (20 Nov 2023 17:22)  T(F): 98 (21 Nov 2023 10:06), Max: 98.5 (20 Nov 2023 17:22)  HR: 75 (21 Nov 2023 10:06) (73 - 90)  BP: 106/68 (21 Nov 2023 10:06) (95/59 - 114/67)  BP(mean): --  RR: 18 (21 Nov 2023 10:06) (16 - 18)  SpO2: 99% (21 Nov 2023 10:06) (96% - 99%)  Parameters below as of 21 Nov 2023 10:06  Patient On (Oxygen Delivery Method): room air    in NAD  rrr  Abd soft, non distended  No pedal edema    MEDICATIONS  (STANDING):  chlorhexidine 2% Cloths 1 Application(s) Topical <User Schedule>  hydrocortisone hemorrhoidal Suppository 1 Suppository(s) Rectal at bedtime  influenza   Vaccine 0.5 milliLiter(s) IntraMuscular once  memantine 10 milliGRAM(s) Oral two times a day  methadone    Tablet 15 milliGRAM(s) Oral <User Schedule>  methadone    Tablet 10 milliGRAM(s) Oral <User Schedule>  methocarbamol 750 milliGRAM(s) Oral every 8 hours  pantoprazole    Tablet 40 milliGRAM(s) Oral before breakfast  piperacillin/tazobactam IVPB.. 3.375 Gram(s) IV Intermittent every 8 hours  pregabalin 150 milliGRAM(s) Oral three times a day    CBC:            8.3    2.83  )-----------( 221      ( 11-21-23 @ 05:53 )             27.8     Chem:         ( 11-21-23 @ 05:53 )    141  |  107  |  5.0<L>  ----------------------------<  115<H>  3.9   |  24.0  |  0.49<L>

## 2023-11-22 LAB
CULTURE RESULTS: SIGNIFICANT CHANGE UP
SPECIMEN SOURCE: SIGNIFICANT CHANGE UP

## 2023-12-04 ENCOUNTER — EMERGENCY (EMERGENCY)
Facility: HOSPITAL | Age: 38
LOS: 1 days | Discharge: HOME | End: 2023-12-04
Attending: EMERGENCY MEDICINE
Payer: MEDICARE

## 2023-12-04 VITALS
WEIGHT: 133.6 LBS | TEMPERATURE: 99 F | DIASTOLIC BLOOD PRESSURE: 57 MMHG | RESPIRATION RATE: 20 BRPM | HEIGHT: 65 IN | OXYGEN SATURATION: 98 % | HEART RATE: 85 BPM | SYSTOLIC BLOOD PRESSURE: 99 MMHG

## 2023-12-04 DIAGNOSIS — Z90.89 ACQUIRED ABSENCE OF OTHER ORGANS: Chronic | ICD-10-CM

## 2023-12-04 DIAGNOSIS — Z98.890 OTHER SPECIFIED POSTPROCEDURAL STATES: Chronic | ICD-10-CM

## 2023-12-04 LAB
ACANTHOCYTES BLD QL SMEAR: SLIGHT — SIGNIFICANT CHANGE UP
ACANTHOCYTES BLD QL SMEAR: SLIGHT — SIGNIFICANT CHANGE UP
ALBUMIN SERPL ELPH-MCNC: 3.1 G/DL — LOW (ref 3.3–5.2)
ALBUMIN SERPL ELPH-MCNC: 3.1 G/DL — LOW (ref 3.3–5.2)
ALP SERPL-CCNC: 117 U/L — SIGNIFICANT CHANGE UP (ref 40–120)
ALP SERPL-CCNC: 117 U/L — SIGNIFICANT CHANGE UP (ref 40–120)
ALT FLD-CCNC: 18 U/L — SIGNIFICANT CHANGE UP
ALT FLD-CCNC: 18 U/L — SIGNIFICANT CHANGE UP
ANION GAP SERPL CALC-SCNC: 9 MMOL/L — SIGNIFICANT CHANGE UP (ref 5–17)
ANION GAP SERPL CALC-SCNC: 9 MMOL/L — SIGNIFICANT CHANGE UP (ref 5–17)
ANISOCYTOSIS BLD QL: SIGNIFICANT CHANGE UP
ANISOCYTOSIS BLD QL: SIGNIFICANT CHANGE UP
APPEARANCE UR: CLEAR — SIGNIFICANT CHANGE UP
APPEARANCE UR: CLEAR — SIGNIFICANT CHANGE UP
AST SERPL-CCNC: 31 U/L — SIGNIFICANT CHANGE UP
AST SERPL-CCNC: 31 U/L — SIGNIFICANT CHANGE UP
BACTERIA # UR AUTO: NEGATIVE /HPF — SIGNIFICANT CHANGE UP
BACTERIA # UR AUTO: NEGATIVE /HPF — SIGNIFICANT CHANGE UP
BASOPHILS # BLD AUTO: 0 K/UL — SIGNIFICANT CHANGE UP (ref 0–0.2)
BASOPHILS # BLD AUTO: 0 K/UL — SIGNIFICANT CHANGE UP (ref 0–0.2)
BASOPHILS NFR BLD AUTO: 0 % — SIGNIFICANT CHANGE UP (ref 0–2)
BASOPHILS NFR BLD AUTO: 0 % — SIGNIFICANT CHANGE UP (ref 0–2)
BILIRUB SERPL-MCNC: 0.4 MG/DL — SIGNIFICANT CHANGE UP (ref 0.4–2)
BILIRUB SERPL-MCNC: 0.4 MG/DL — SIGNIFICANT CHANGE UP (ref 0.4–2)
BILIRUB UR-MCNC: NEGATIVE — SIGNIFICANT CHANGE UP
BILIRUB UR-MCNC: NEGATIVE — SIGNIFICANT CHANGE UP
BLD GP AB SCN SERPL QL: SIGNIFICANT CHANGE UP
BLD GP AB SCN SERPL QL: SIGNIFICANT CHANGE UP
BUN SERPL-MCNC: 8.9 MG/DL — SIGNIFICANT CHANGE UP (ref 8–20)
BUN SERPL-MCNC: 8.9 MG/DL — SIGNIFICANT CHANGE UP (ref 8–20)
CALCIUM SERPL-MCNC: 8.5 MG/DL — SIGNIFICANT CHANGE UP (ref 8.4–10.5)
CALCIUM SERPL-MCNC: 8.5 MG/DL — SIGNIFICANT CHANGE UP (ref 8.4–10.5)
CHLORIDE SERPL-SCNC: 107 MMOL/L — SIGNIFICANT CHANGE UP (ref 96–108)
CHLORIDE SERPL-SCNC: 107 MMOL/L — SIGNIFICANT CHANGE UP (ref 96–108)
CO2 SERPL-SCNC: 24 MMOL/L — SIGNIFICANT CHANGE UP (ref 22–29)
CO2 SERPL-SCNC: 24 MMOL/L — SIGNIFICANT CHANGE UP (ref 22–29)
COLOR SPEC: YELLOW — SIGNIFICANT CHANGE UP
COLOR SPEC: YELLOW — SIGNIFICANT CHANGE UP
CREAT SERPL-MCNC: 0.5 MG/DL — SIGNIFICANT CHANGE UP (ref 0.5–1.3)
CREAT SERPL-MCNC: 0.5 MG/DL — SIGNIFICANT CHANGE UP (ref 0.5–1.3)
DACRYOCYTES BLD QL SMEAR: SLIGHT — SIGNIFICANT CHANGE UP
DACRYOCYTES BLD QL SMEAR: SLIGHT — SIGNIFICANT CHANGE UP
DIFF PNL FLD: NEGATIVE — SIGNIFICANT CHANGE UP
DIFF PNL FLD: NEGATIVE — SIGNIFICANT CHANGE UP
EGFR: 124 ML/MIN/1.73M2 — SIGNIFICANT CHANGE UP
EGFR: 124 ML/MIN/1.73M2 — SIGNIFICANT CHANGE UP
EOSINOPHIL # BLD AUTO: 0.09 K/UL — SIGNIFICANT CHANGE UP (ref 0–0.5)
EOSINOPHIL # BLD AUTO: 0.09 K/UL — SIGNIFICANT CHANGE UP (ref 0–0.5)
EOSINOPHIL NFR BLD AUTO: 2.6 % — SIGNIFICANT CHANGE UP (ref 0–6)
EOSINOPHIL NFR BLD AUTO: 2.6 % — SIGNIFICANT CHANGE UP (ref 0–6)
GIANT PLATELETS BLD QL SMEAR: PRESENT — SIGNIFICANT CHANGE UP
GIANT PLATELETS BLD QL SMEAR: PRESENT — SIGNIFICANT CHANGE UP
GLUCOSE SERPL-MCNC: 98 MG/DL — SIGNIFICANT CHANGE UP (ref 70–99)
GLUCOSE SERPL-MCNC: 98 MG/DL — SIGNIFICANT CHANGE UP (ref 70–99)
GLUCOSE UR QL: NEGATIVE MG/DL — SIGNIFICANT CHANGE UP
GLUCOSE UR QL: NEGATIVE MG/DL — SIGNIFICANT CHANGE UP
HCG SERPL-ACNC: <4 MIU/ML — SIGNIFICANT CHANGE UP
HCG SERPL-ACNC: <4 MIU/ML — SIGNIFICANT CHANGE UP
HCT VFR BLD CALC: 22.5 % — LOW (ref 34.5–45)
HCT VFR BLD CALC: 22.5 % — LOW (ref 34.5–45)
HGB BLD-MCNC: 6.9 G/DL — CRITICAL LOW (ref 11.5–15.5)
HGB BLD-MCNC: 6.9 G/DL — CRITICAL LOW (ref 11.5–15.5)
KETONES UR-MCNC: ABNORMAL MG/DL
KETONES UR-MCNC: ABNORMAL MG/DL
LEUKOCYTE ESTERASE UR-ACNC: ABNORMAL
LEUKOCYTE ESTERASE UR-ACNC: ABNORMAL
LYMPHOCYTES # BLD AUTO: 0.32 K/UL — LOW (ref 1–3.3)
LYMPHOCYTES # BLD AUTO: 0.32 K/UL — LOW (ref 1–3.3)
LYMPHOCYTES # BLD AUTO: 8.7 % — LOW (ref 13–44)
LYMPHOCYTES # BLD AUTO: 8.7 % — LOW (ref 13–44)
MACROCYTES BLD QL: SLIGHT — SIGNIFICANT CHANGE UP
MACROCYTES BLD QL: SLIGHT — SIGNIFICANT CHANGE UP
MANUAL SMEAR VERIFICATION: SIGNIFICANT CHANGE UP
MANUAL SMEAR VERIFICATION: SIGNIFICANT CHANGE UP
MCHC RBC-ENTMCNC: 25.7 PG — LOW (ref 27–34)
MCHC RBC-ENTMCNC: 25.7 PG — LOW (ref 27–34)
MCHC RBC-ENTMCNC: 30.7 GM/DL — LOW (ref 32–36)
MCHC RBC-ENTMCNC: 30.7 GM/DL — LOW (ref 32–36)
MCV RBC AUTO: 83.6 FL — SIGNIFICANT CHANGE UP (ref 80–100)
MCV RBC AUTO: 83.6 FL — SIGNIFICANT CHANGE UP (ref 80–100)
MONOCYTES # BLD AUTO: 0.22 K/UL — SIGNIFICANT CHANGE UP (ref 0–0.9)
MONOCYTES # BLD AUTO: 0.22 K/UL — SIGNIFICANT CHANGE UP (ref 0–0.9)
MONOCYTES NFR BLD AUTO: 6.1 % — SIGNIFICANT CHANGE UP (ref 2–14)
MONOCYTES NFR BLD AUTO: 6.1 % — SIGNIFICANT CHANGE UP (ref 2–14)
NEUTROPHILS # BLD AUTO: 2.97 K/UL — SIGNIFICANT CHANGE UP (ref 1.8–7.4)
NEUTROPHILS # BLD AUTO: 2.97 K/UL — SIGNIFICANT CHANGE UP (ref 1.8–7.4)
NEUTROPHILS NFR BLD AUTO: 81.7 % — HIGH (ref 43–77)
NEUTROPHILS NFR BLD AUTO: 81.7 % — HIGH (ref 43–77)
NITRITE UR-MCNC: NEGATIVE — SIGNIFICANT CHANGE UP
NITRITE UR-MCNC: NEGATIVE — SIGNIFICANT CHANGE UP
PH UR: 6.5 — SIGNIFICANT CHANGE UP (ref 5–8)
PH UR: 6.5 — SIGNIFICANT CHANGE UP (ref 5–8)
PLAT MORPH BLD: NORMAL — SIGNIFICANT CHANGE UP
PLAT MORPH BLD: NORMAL — SIGNIFICANT CHANGE UP
PLATELET # BLD AUTO: 217 K/UL — SIGNIFICANT CHANGE UP (ref 150–400)
PLATELET # BLD AUTO: 217 K/UL — SIGNIFICANT CHANGE UP (ref 150–400)
POIKILOCYTOSIS BLD QL AUTO: SIGNIFICANT CHANGE UP
POIKILOCYTOSIS BLD QL AUTO: SIGNIFICANT CHANGE UP
POLYCHROMASIA BLD QL SMEAR: SLIGHT — SIGNIFICANT CHANGE UP
POLYCHROMASIA BLD QL SMEAR: SLIGHT — SIGNIFICANT CHANGE UP
POTASSIUM SERPL-MCNC: 3.9 MMOL/L — SIGNIFICANT CHANGE UP (ref 3.5–5.3)
POTASSIUM SERPL-MCNC: 3.9 MMOL/L — SIGNIFICANT CHANGE UP (ref 3.5–5.3)
POTASSIUM SERPL-SCNC: 3.9 MMOL/L — SIGNIFICANT CHANGE UP (ref 3.5–5.3)
POTASSIUM SERPL-SCNC: 3.9 MMOL/L — SIGNIFICANT CHANGE UP (ref 3.5–5.3)
PROT SERPL-MCNC: 5.6 G/DL — LOW (ref 6.6–8.7)
PROT SERPL-MCNC: 5.6 G/DL — LOW (ref 6.6–8.7)
PROT UR-MCNC: NEGATIVE MG/DL — SIGNIFICANT CHANGE UP
PROT UR-MCNC: NEGATIVE MG/DL — SIGNIFICANT CHANGE UP
RBC # BLD: 2.69 M/UL — LOW (ref 3.8–5.2)
RBC # BLD: 2.69 M/UL — LOW (ref 3.8–5.2)
RBC # FLD: 22.6 % — HIGH (ref 10.3–14.5)
RBC # FLD: 22.6 % — HIGH (ref 10.3–14.5)
RBC BLD AUTO: ABNORMAL
RBC BLD AUTO: ABNORMAL
RBC CASTS # UR COMP ASSIST: 2 /HPF — SIGNIFICANT CHANGE UP (ref 0–4)
RBC CASTS # UR COMP ASSIST: 2 /HPF — SIGNIFICANT CHANGE UP (ref 0–4)
SMUDGE CELLS # BLD: PRESENT — SIGNIFICANT CHANGE UP
SMUDGE CELLS # BLD: PRESENT — SIGNIFICANT CHANGE UP
SODIUM SERPL-SCNC: 140 MMOL/L — SIGNIFICANT CHANGE UP (ref 135–145)
SODIUM SERPL-SCNC: 140 MMOL/L — SIGNIFICANT CHANGE UP (ref 135–145)
SP GR SPEC: 1.02 — SIGNIFICANT CHANGE UP (ref 1–1.03)
SP GR SPEC: 1.02 — SIGNIFICANT CHANGE UP (ref 1–1.03)
SQUAMOUS # UR AUTO: 5 /HPF — SIGNIFICANT CHANGE UP (ref 0–5)
SQUAMOUS # UR AUTO: 5 /HPF — SIGNIFICANT CHANGE UP (ref 0–5)
TARGETS BLD QL SMEAR: SLIGHT — SIGNIFICANT CHANGE UP
TARGETS BLD QL SMEAR: SLIGHT — SIGNIFICANT CHANGE UP
UROBILINOGEN FLD QL: 1 MG/DL — SIGNIFICANT CHANGE UP (ref 0.2–1)
UROBILINOGEN FLD QL: 1 MG/DL — SIGNIFICANT CHANGE UP (ref 0.2–1)
VARIANT LYMPHS # BLD: 0.9 % — SIGNIFICANT CHANGE UP (ref 0–6)
VARIANT LYMPHS # BLD: 0.9 % — SIGNIFICANT CHANGE UP (ref 0–6)
WBC # BLD: 3.64 K/UL — LOW (ref 3.8–10.5)
WBC # BLD: 3.64 K/UL — LOW (ref 3.8–10.5)
WBC # FLD AUTO: 3.64 K/UL — LOW (ref 3.8–10.5)
WBC # FLD AUTO: 3.64 K/UL — LOW (ref 3.8–10.5)
WBC UR QL: 6 /HPF — HIGH (ref 0–5)
WBC UR QL: 6 /HPF — HIGH (ref 0–5)

## 2023-12-04 PROCEDURE — 99285 EMERGENCY DEPT VISIT HI MDM: CPT

## 2023-12-04 PROCEDURE — 74177 CT ABD & PELVIS W/CONTRAST: CPT | Mod: 26,MA

## 2023-12-04 RX ORDER — HYDROMORPHONE HYDROCHLORIDE 2 MG/ML
1 INJECTION INTRAMUSCULAR; INTRAVENOUS; SUBCUTANEOUS ONCE
Refills: 0 | Status: DISCONTINUED | OUTPATIENT
Start: 2023-12-04 | End: 2023-12-04

## 2023-12-04 RX ORDER — HYDROMORPHONE HYDROCHLORIDE 2 MG/ML
4 INJECTION INTRAMUSCULAR; INTRAVENOUS; SUBCUTANEOUS
Qty: 100 | Refills: 0 | Status: DISCONTINUED | OUTPATIENT
Start: 2023-12-04 | End: 2023-12-04

## 2023-12-04 RX ORDER — HYDROMORPHONE HYDROCHLORIDE 2 MG/ML
4 INJECTION INTRAMUSCULAR; INTRAVENOUS; SUBCUTANEOUS
Qty: 100 | Refills: 0 | Status: DISCONTINUED | OUTPATIENT
Start: 2023-12-04 | End: 2023-12-05

## 2023-12-04 RX ADMIN — HYDROMORPHONE HYDROCHLORIDE 1 MILLIGRAM(S): 2 INJECTION INTRAMUSCULAR; INTRAVENOUS; SUBCUTANEOUS at 16:27

## 2023-12-04 RX ADMIN — HYDROMORPHONE HYDROCHLORIDE 1 MILLIGRAM(S): 2 INJECTION INTRAMUSCULAR; INTRAVENOUS; SUBCUTANEOUS at 17:07

## 2023-12-04 RX ADMIN — HYDROMORPHONE HYDROCHLORIDE 4 MG/HR: 2 INJECTION INTRAMUSCULAR; INTRAVENOUS; SUBCUTANEOUS at 22:02

## 2023-12-04 RX ADMIN — HYDROMORPHONE HYDROCHLORIDE 4 MG/HR: 2 INJECTION INTRAMUSCULAR; INTRAVENOUS; SUBCUTANEOUS at 22:12

## 2023-12-04 RX ADMIN — HYDROMORPHONE HYDROCHLORIDE 4 MG/HR: 2 INJECTION INTRAMUSCULAR; INTRAVENOUS; SUBCUTANEOUS at 18:02

## 2023-12-04 NOTE — ED PROVIDER NOTE - OBJECTIVE STATEMENT
38-year-old female with stage IV metastatic breast cancer, anemia requiring multiple transfusions presents sent in by her oncologist for low hemoglobin, requesting she be transfused 2 units, but patient notes intermittent, cramping lower abdominal pain that started around 12:30 PM.  She denies similar symptoms in the past.  She states the last time she was here for blood transfusion she also had a CAT scan which showed possible colitis, was followed up with a colonoscopy without any obvious etiology.  She denies fevers, nausea, vomiting, diarrhea, constipation.  She did not take any of her pain medication today as she was running around to multiple doctors appointments.  She feels weak, fatigued.  She has not received chemotherapy since November 1 due to her anemia.

## 2023-12-04 NOTE — ED PROVIDER NOTE - PROGRESS NOTE DETAILS
Jefry: patient consented for 2 U pRBC. Esther VERDUZCO: 2 unit PRBCs transfused without issue.  Patient ready for discharge with outpatient follow-up.

## 2023-12-04 NOTE — ED PROVIDER NOTE - NSFOLLOWUPINSTRUCTIONS_ED_ALL_ED_FT
- Follow up with your doctor within 2-3 days.   - Bring results with you to the appointment.   - Return to the ED for any new or worsening symptoms.     Anemia    Anemia is a condition in which the concentration of red blood cells or hemoglobin in the blood is below normal. Hemoglobin is a substance in red blood cells that carries oxygen to the tissues of the body. Anemia results in not enough oxygen reaching these tissues which can cause symptoms such as weakness, dizziness/lightheadedness, shortness of breath, chest pain, paleness, or nausea. The cause of your anemia may or may not be determined immediately. If your hemoglobin was dangerously low, you may have received a blood transfusion. Usually reactions to transfusions occur immediately but monitor yourself for any fevers, rash, or shortness of breath.    SEEK IMMEDIATE MEDICAL CARE IF YOU HAVE ANY OF THE FOLLOWING SYMPTOMS: extreme weakness/chest pain/shortness of breath, black or bloody stools, vomiting blood, fainting, fever, or any signs of dehydration.

## 2023-12-04 NOTE — ED ADULT NURSE NOTE - NSFALLUNIVINTERV_ED_ALL_ED
Bed/Stretcher in lowest position, wheels locked, appropriate side rails in place/Call bell, personal items and telephone in reach/Instruct patient to call for assistance before getting out of bed/chair/stretcher/Non-slip footwear applied when patient is off stretcher/[place] to call system/Physically safe environment - no spills, clutter or unnecessary equipment/Purposeful proactive rounding/Room/bathroom lighting operational, light cord in reach Bed/Stretcher in lowest position, wheels locked, appropriate side rails in place/Call bell, personal items and telephone in reach/Instruct patient to call for assistance before getting out of bed/chair/stretcher/Non-slip footwear applied when patient is off stretcher/[place] to call system/Physically safe environment - no spills, clutter or unnecessary equipment/Purposeful proactive rounding/Room/bathroom lighting operational, light cord in reach

## 2023-12-04 NOTE — ED PROVIDER NOTE - CLINICAL SUMMARY MEDICAL DECISION MAKING FREE TEXT BOX
38-year-old female with stage IV metastatic breast cancer, anemia requiring multiple transfusions was sent in by her oncologist for transfusion of 2 units, but in  the meantime developed intermittent, cramping lower abdominal pain.  Prior CT revealed questionable colitis, patient had colonoscopy without discernible etiology at that time.  She denies any constipation, diarrhea, similar pain in the past.  She did not take any of her pain medication today, will give Dilaudid, check labs, plan to transfuse 2 units, CT abdomen/pelvis to evaluate for cause of lower abdominal pain.

## 2023-12-04 NOTE — ED PROVIDER NOTE - SHIFT CHANGE DETAILS
Patient signed out pending CT A/P and blood transfusion, all further work up and management at the discretion of receiving physician.

## 2023-12-04 NOTE — ED ADULT NURSE REASSESSMENT NOTE - NS ED NURSE REASSESS COMMENT FT1
Blood transfusion started on pt. Pt advised of all possible reaction types to notify, will remain at bedside with pt for 15mins to monitor for any type of reaction

## 2023-12-04 NOTE — ED PROVIDER NOTE - NS ED ROS FT
Const:  + Fatigue. Denies fever, chills  HEENT: Denies blurry vision, sore throat  Neck: Denies neck pain/stiffness  Resp: Denies coughing, SOB  Cardiovascular: Denies CP, palpitations, LE edema  GI:  + Abdominal pain. Denies nausea, vomiting, diarrhea, constipation, blood in stool  : Denies urinary frequency/urgency/dysuria, hematuria  MSK: Denies back pain  Neuro: Denies HA, dizziness, numbness, weakness  Skin: Denies rashes.

## 2023-12-04 NOTE — ED PROVIDER NOTE - PATIENT PORTAL LINK FT
You can access the FollowMyHealth Patient Portal offered by [facility] by registering at the following website: [email or web address]. By joining [employer]’s FollowMyHealth portal, you will also be able to view your health information using other applications (apps) compatible with our system. You can access the FollowMyHealth Patient Portal offered by [facility] by registering at the following website: [email or web address]. By joining [employer]’s FollowMyHealth portal, you will also be able to view your health information using other applications (apps) compatible with our system.

## 2023-12-04 NOTE — ED ADULT NURSE REASSESSMENT NOTE - NS ED NURSE REASSESS COMMENT FT1
Blood transfusion started with 2nd RN Jacqueline. Pt educated on signs and symptoms of blood transfusion. Pt denies past reaction to blood transfusion. VSS.

## 2023-12-04 NOTE — ED PROVIDER NOTE - PHYSICAL EXAMINATION
Const: Awake, alert and oriented. In no acute distress.   Chronically ill-appearing.  HEENT: NC/AT.   Pale, dry mucous membranes.  Eyes: No scleral icterus. EOMI.  Neck:. Soft and supple. Full ROM without pain.  Cardiac: Regular rate and regular rhythm. +S1/S2. No murmurs. Peripheral pulses 2+ and symmetric. No LE edema.  Resp: Speaking in full sentences. No evidence of respiratory distress. No wheezes, rales or rhonchi.  Abd: Soft,  lower abdominal tenderness to palpation, non-distended. Normal bowel sounds in all 4 quadrants. No guarding or rebound.  Back: Spine midline and non-tender. No CVAT.  Skin: No rashes, abrasions or lacerations.  Neuro: Awake, alert & oriented x 3. Moves all extremities symmetrically.

## 2023-12-05 ENCOUNTER — APPOINTMENT (OUTPATIENT)
Dept: GASTROENTEROLOGY | Facility: CLINIC | Age: 38
End: 2023-12-05
Payer: MEDICARE

## 2023-12-05 VITALS
HEART RATE: 77 BPM | SYSTOLIC BLOOD PRESSURE: 98 MMHG | OXYGEN SATURATION: 98 % | RESPIRATION RATE: 18 BRPM | DIASTOLIC BLOOD PRESSURE: 52 MMHG | TEMPERATURE: 98 F

## 2023-12-05 VITALS
OXYGEN SATURATION: 98 % | BODY MASS INDEX: 26.06 KG/M2 | SYSTOLIC BLOOD PRESSURE: 100 MMHG | HEIGHT: 61 IN | RESPIRATION RATE: 16 BRPM | WEIGHT: 138 LBS | HEART RATE: 70 BPM | DIASTOLIC BLOOD PRESSURE: 70 MMHG

## 2023-12-05 LAB
CULTURE RESULTS: NO GROWTH — SIGNIFICANT CHANGE UP
CULTURE RESULTS: NO GROWTH — SIGNIFICANT CHANGE UP
SPECIMEN SOURCE: SIGNIFICANT CHANGE UP
SPECIMEN SOURCE: SIGNIFICANT CHANGE UP

## 2023-12-05 PROCEDURE — 96365 THER/PROPH/DIAG IV INF INIT: CPT | Mod: XU

## 2023-12-05 PROCEDURE — 86900 BLOOD TYPING SEROLOGIC ABO: CPT

## 2023-12-05 PROCEDURE — 85025 COMPLETE CBC W/AUTO DIFF WBC: CPT

## 2023-12-05 PROCEDURE — 86923 COMPATIBILITY TEST ELECTRIC: CPT

## 2023-12-05 PROCEDURE — 74177 CT ABD & PELVIS W/CONTRAST: CPT | Mod: MA

## 2023-12-05 PROCEDURE — 86850 RBC ANTIBODY SCREEN: CPT

## 2023-12-05 PROCEDURE — 36430 TRANSFUSION BLD/BLD COMPNT: CPT

## 2023-12-05 PROCEDURE — P9016: CPT

## 2023-12-05 PROCEDURE — 36415 COLL VENOUS BLD VENIPUNCTURE: CPT

## 2023-12-05 PROCEDURE — 96366 THER/PROPH/DIAG IV INF ADDON: CPT

## 2023-12-05 PROCEDURE — 81001 URINALYSIS AUTO W/SCOPE: CPT

## 2023-12-05 PROCEDURE — 99285 EMERGENCY DEPT VISIT HI MDM: CPT | Mod: 25

## 2023-12-05 PROCEDURE — 99214 OFFICE O/P EST MOD 30 MIN: CPT

## 2023-12-05 PROCEDURE — 96376 TX/PRO/DX INJ SAME DRUG ADON: CPT

## 2023-12-05 PROCEDURE — 84702 CHORIONIC GONADOTROPIN TEST: CPT

## 2023-12-05 PROCEDURE — 87086 URINE CULTURE/COLONY COUNT: CPT

## 2023-12-05 PROCEDURE — 80053 COMPREHEN METABOLIC PANEL: CPT

## 2023-12-05 PROCEDURE — 86901 BLOOD TYPING SEROLOGIC RH(D): CPT

## 2023-12-05 RX ADMIN — HYDROMORPHONE HYDROCHLORIDE 4 MG/HR: 2 INJECTION INTRAMUSCULAR; INTRAVENOUS; SUBCUTANEOUS at 00:23

## 2023-12-05 RX ADMIN — HYDROMORPHONE HYDROCHLORIDE 4 MG/HR: 2 INJECTION INTRAMUSCULAR; INTRAVENOUS; SUBCUTANEOUS at 00:33

## 2023-12-05 NOTE — ED ADULT NURSE REASSESSMENT NOTE - NS ED NURSE REASSESS COMMENT FT1
Patient discharged. Patient was on Dilaudid drip wasted with KRISSY Mcmullen. 90.3 remaining. No acute distress noted.

## 2023-12-05 NOTE — ED ADULT NURSE REASSESSMENT NOTE - NS ED NURSE REASSESS COMMENT FT1
Assumed care from RN. Patient AXOX3, RR even and unlabored. Patient second unit of blood in progress. Dilaudid drip ordered and in progress. No acute distress noted.

## 2023-12-06 ENCOUNTER — EMERGENCY (EMERGENCY)
Facility: HOSPITAL | Age: 38
LOS: 1 days | Discharge: HOME | End: 2023-12-06
Attending: EMERGENCY MEDICINE
Payer: MEDICARE

## 2023-12-06 VITALS
HEART RATE: 70 BPM | RESPIRATION RATE: 16 BRPM | SYSTOLIC BLOOD PRESSURE: 108 MMHG | OXYGEN SATURATION: 97 % | WEIGHT: 131.84 LBS | TEMPERATURE: 98 F | HEIGHT: 65 IN | DIASTOLIC BLOOD PRESSURE: 65 MMHG

## 2023-12-06 VITALS
OXYGEN SATURATION: 98 % | DIASTOLIC BLOOD PRESSURE: 63 MMHG | HEART RATE: 67 BPM | SYSTOLIC BLOOD PRESSURE: 110 MMHG | TEMPERATURE: 98 F | RESPIRATION RATE: 16 BRPM

## 2023-12-06 DIAGNOSIS — Z98.890 OTHER SPECIFIED POSTPROCEDURAL STATES: Chronic | ICD-10-CM

## 2023-12-06 DIAGNOSIS — Z90.89 ACQUIRED ABSENCE OF OTHER ORGANS: Chronic | ICD-10-CM

## 2023-12-06 LAB
ALBUMIN SERPL ELPH-MCNC: 3.4 G/DL — SIGNIFICANT CHANGE UP (ref 3.3–5.2)
ALBUMIN SERPL ELPH-MCNC: 3.4 G/DL — SIGNIFICANT CHANGE UP (ref 3.3–5.2)
ALP SERPL-CCNC: 114 U/L — SIGNIFICANT CHANGE UP (ref 40–120)
ALP SERPL-CCNC: 114 U/L — SIGNIFICANT CHANGE UP (ref 40–120)
ALT FLD-CCNC: 15 U/L — SIGNIFICANT CHANGE UP
ALT FLD-CCNC: 15 U/L — SIGNIFICANT CHANGE UP
ANION GAP SERPL CALC-SCNC: 12 MMOL/L — SIGNIFICANT CHANGE UP (ref 5–17)
ANION GAP SERPL CALC-SCNC: 12 MMOL/L — SIGNIFICANT CHANGE UP (ref 5–17)
APPEARANCE UR: CLEAR — SIGNIFICANT CHANGE UP
APPEARANCE UR: CLEAR — SIGNIFICANT CHANGE UP
AST SERPL-CCNC: 35 U/L — HIGH
AST SERPL-CCNC: 35 U/L — HIGH
BACTERIA # UR AUTO: NEGATIVE /HPF — SIGNIFICANT CHANGE UP
BACTERIA # UR AUTO: NEGATIVE /HPF — SIGNIFICANT CHANGE UP
BASE EXCESS BLDV CALC-SCNC: -0.6 MMOL/L — SIGNIFICANT CHANGE UP (ref -2–3)
BASE EXCESS BLDV CALC-SCNC: -0.6 MMOL/L — SIGNIFICANT CHANGE UP (ref -2–3)
BASOPHILS # BLD AUTO: 0.01 K/UL — SIGNIFICANT CHANGE UP (ref 0–0.2)
BASOPHILS # BLD AUTO: 0.01 K/UL — SIGNIFICANT CHANGE UP (ref 0–0.2)
BASOPHILS NFR BLD AUTO: 0.2 % — SIGNIFICANT CHANGE UP (ref 0–2)
BASOPHILS NFR BLD AUTO: 0.2 % — SIGNIFICANT CHANGE UP (ref 0–2)
BILIRUB SERPL-MCNC: 1 MG/DL — SIGNIFICANT CHANGE UP (ref 0.4–2)
BILIRUB SERPL-MCNC: 1 MG/DL — SIGNIFICANT CHANGE UP (ref 0.4–2)
BILIRUB UR-MCNC: NEGATIVE — SIGNIFICANT CHANGE UP
BILIRUB UR-MCNC: NEGATIVE — SIGNIFICANT CHANGE UP
BUN SERPL-MCNC: 9 MG/DL — SIGNIFICANT CHANGE UP (ref 8–20)
BUN SERPL-MCNC: 9 MG/DL — SIGNIFICANT CHANGE UP (ref 8–20)
CA-I SERPL-SCNC: 1.18 MMOL/L — SIGNIFICANT CHANGE UP (ref 1.15–1.33)
CA-I SERPL-SCNC: 1.18 MMOL/L — SIGNIFICANT CHANGE UP (ref 1.15–1.33)
CALCIUM SERPL-MCNC: 8.5 MG/DL — SIGNIFICANT CHANGE UP (ref 8.4–10.5)
CALCIUM SERPL-MCNC: 8.5 MG/DL — SIGNIFICANT CHANGE UP (ref 8.4–10.5)
CAST: 1 /LPF — SIGNIFICANT CHANGE UP (ref 0–4)
CAST: 1 /LPF — SIGNIFICANT CHANGE UP (ref 0–4)
CHLORIDE BLDV-SCNC: 106 MMOL/L — SIGNIFICANT CHANGE UP (ref 96–108)
CHLORIDE BLDV-SCNC: 106 MMOL/L — SIGNIFICANT CHANGE UP (ref 96–108)
CHLORIDE SERPL-SCNC: 103 MMOL/L — SIGNIFICANT CHANGE UP (ref 96–108)
CHLORIDE SERPL-SCNC: 103 MMOL/L — SIGNIFICANT CHANGE UP (ref 96–108)
CO2 SERPL-SCNC: 23 MMOL/L — SIGNIFICANT CHANGE UP (ref 22–29)
CO2 SERPL-SCNC: 23 MMOL/L — SIGNIFICANT CHANGE UP (ref 22–29)
COLOR SPEC: YELLOW — SIGNIFICANT CHANGE UP
COLOR SPEC: YELLOW — SIGNIFICANT CHANGE UP
CREAT SERPL-MCNC: 0.47 MG/DL — LOW (ref 0.5–1.3)
CREAT SERPL-MCNC: 0.47 MG/DL — LOW (ref 0.5–1.3)
DIFF PNL FLD: NEGATIVE — SIGNIFICANT CHANGE UP
DIFF PNL FLD: NEGATIVE — SIGNIFICANT CHANGE UP
EGFR: 126 ML/MIN/1.73M2 — SIGNIFICANT CHANGE UP
EGFR: 126 ML/MIN/1.73M2 — SIGNIFICANT CHANGE UP
EOSINOPHIL # BLD AUTO: 0.09 K/UL — SIGNIFICANT CHANGE UP (ref 0–0.5)
EOSINOPHIL # BLD AUTO: 0.09 K/UL — SIGNIFICANT CHANGE UP (ref 0–0.5)
EOSINOPHIL NFR BLD AUTO: 2.1 % — SIGNIFICANT CHANGE UP (ref 0–6)
EOSINOPHIL NFR BLD AUTO: 2.1 % — SIGNIFICANT CHANGE UP (ref 0–6)
GAS PNL BLDV: 137 MMOL/L — SIGNIFICANT CHANGE UP (ref 136–145)
GAS PNL BLDV: 137 MMOL/L — SIGNIFICANT CHANGE UP (ref 136–145)
GAS PNL BLDV: SIGNIFICANT CHANGE UP
GAS PNL BLDV: SIGNIFICANT CHANGE UP
GLUCOSE BLDV-MCNC: 94 MG/DL — SIGNIFICANT CHANGE UP (ref 70–99)
GLUCOSE BLDV-MCNC: 94 MG/DL — SIGNIFICANT CHANGE UP (ref 70–99)
GLUCOSE SERPL-MCNC: 95 MG/DL — SIGNIFICANT CHANGE UP (ref 70–99)
GLUCOSE SERPL-MCNC: 95 MG/DL — SIGNIFICANT CHANGE UP (ref 70–99)
GLUCOSE UR QL: NEGATIVE MG/DL — SIGNIFICANT CHANGE UP
GLUCOSE UR QL: NEGATIVE MG/DL — SIGNIFICANT CHANGE UP
HCG UR QL: NEGATIVE — SIGNIFICANT CHANGE UP
HCG UR QL: NEGATIVE — SIGNIFICANT CHANGE UP
HCO3 BLDV-SCNC: 25 MMOL/L — SIGNIFICANT CHANGE UP (ref 22–29)
HCO3 BLDV-SCNC: 25 MMOL/L — SIGNIFICANT CHANGE UP (ref 22–29)
HCT VFR BLD CALC: 32.9 % — LOW (ref 34.5–45)
HCT VFR BLD CALC: 32.9 % — LOW (ref 34.5–45)
HCT VFR BLDA CALC: 33 % — SIGNIFICANT CHANGE UP
HCT VFR BLDA CALC: 33 % — SIGNIFICANT CHANGE UP
HGB BLD CALC-MCNC: 10.9 G/DL — LOW (ref 11.7–16.1)
HGB BLD CALC-MCNC: 10.9 G/DL — LOW (ref 11.7–16.1)
HGB BLD-MCNC: 10.4 G/DL — LOW (ref 11.5–15.5)
HGB BLD-MCNC: 10.4 G/DL — LOW (ref 11.5–15.5)
IMM GRANULOCYTES NFR BLD AUTO: 0.7 % — SIGNIFICANT CHANGE UP (ref 0–0.9)
IMM GRANULOCYTES NFR BLD AUTO: 0.7 % — SIGNIFICANT CHANGE UP (ref 0–0.9)
KETONES UR-MCNC: 15 MG/DL
KETONES UR-MCNC: 15 MG/DL
LACTATE BLDV-MCNC: 1.5 MMOL/L — SIGNIFICANT CHANGE UP (ref 0.5–2)
LACTATE BLDV-MCNC: 1.5 MMOL/L — SIGNIFICANT CHANGE UP (ref 0.5–2)
LEUKOCYTE ESTERASE UR-ACNC: ABNORMAL
LEUKOCYTE ESTERASE UR-ACNC: ABNORMAL
LIDOCAIN IGE QN: 9 U/L — LOW (ref 22–51)
LIDOCAIN IGE QN: 9 U/L — LOW (ref 22–51)
LYMPHOCYTES # BLD AUTO: 0.81 K/UL — LOW (ref 1–3.3)
LYMPHOCYTES # BLD AUTO: 0.81 K/UL — LOW (ref 1–3.3)
LYMPHOCYTES # BLD AUTO: 18.7 % — SIGNIFICANT CHANGE UP (ref 13–44)
LYMPHOCYTES # BLD AUTO: 18.7 % — SIGNIFICANT CHANGE UP (ref 13–44)
MCHC RBC-ENTMCNC: 26.2 PG — LOW (ref 27–34)
MCHC RBC-ENTMCNC: 26.2 PG — LOW (ref 27–34)
MCHC RBC-ENTMCNC: 31.6 GM/DL — LOW (ref 32–36)
MCHC RBC-ENTMCNC: 31.6 GM/DL — LOW (ref 32–36)
MCV RBC AUTO: 82.9 FL — SIGNIFICANT CHANGE UP (ref 80–100)
MCV RBC AUTO: 82.9 FL — SIGNIFICANT CHANGE UP (ref 80–100)
MONOCYTES # BLD AUTO: 0.37 K/UL — SIGNIFICANT CHANGE UP (ref 0–0.9)
MONOCYTES # BLD AUTO: 0.37 K/UL — SIGNIFICANT CHANGE UP (ref 0–0.9)
MONOCYTES NFR BLD AUTO: 8.5 % — SIGNIFICANT CHANGE UP (ref 2–14)
MONOCYTES NFR BLD AUTO: 8.5 % — SIGNIFICANT CHANGE UP (ref 2–14)
NEUTROPHILS # BLD AUTO: 3.03 K/UL — SIGNIFICANT CHANGE UP (ref 1.8–7.4)
NEUTROPHILS # BLD AUTO: 3.03 K/UL — SIGNIFICANT CHANGE UP (ref 1.8–7.4)
NEUTROPHILS NFR BLD AUTO: 69.8 % — SIGNIFICANT CHANGE UP (ref 43–77)
NEUTROPHILS NFR BLD AUTO: 69.8 % — SIGNIFICANT CHANGE UP (ref 43–77)
NITRITE UR-MCNC: NEGATIVE — SIGNIFICANT CHANGE UP
NITRITE UR-MCNC: NEGATIVE — SIGNIFICANT CHANGE UP
PCO2 BLDV: 48 MMHG — HIGH (ref 39–42)
PCO2 BLDV: 48 MMHG — HIGH (ref 39–42)
PH BLDV: 7.33 — SIGNIFICANT CHANGE UP (ref 7.32–7.43)
PH BLDV: 7.33 — SIGNIFICANT CHANGE UP (ref 7.32–7.43)
PH UR: 7.5 — SIGNIFICANT CHANGE UP (ref 5–8)
PH UR: 7.5 — SIGNIFICANT CHANGE UP (ref 5–8)
PLATELET # BLD AUTO: 189 K/UL — SIGNIFICANT CHANGE UP (ref 150–400)
PLATELET # BLD AUTO: 189 K/UL — SIGNIFICANT CHANGE UP (ref 150–400)
PO2 BLDV: 85 MMHG — HIGH (ref 25–45)
PO2 BLDV: 85 MMHG — HIGH (ref 25–45)
POTASSIUM BLDV-SCNC: 3.9 MMOL/L — SIGNIFICANT CHANGE UP (ref 3.5–5.1)
POTASSIUM BLDV-SCNC: 3.9 MMOL/L — SIGNIFICANT CHANGE UP (ref 3.5–5.1)
POTASSIUM SERPL-MCNC: 4 MMOL/L — SIGNIFICANT CHANGE UP (ref 3.5–5.3)
POTASSIUM SERPL-MCNC: 4 MMOL/L — SIGNIFICANT CHANGE UP (ref 3.5–5.3)
POTASSIUM SERPL-SCNC: 4 MMOL/L — SIGNIFICANT CHANGE UP (ref 3.5–5.3)
POTASSIUM SERPL-SCNC: 4 MMOL/L — SIGNIFICANT CHANGE UP (ref 3.5–5.3)
PROT SERPL-MCNC: 5.9 G/DL — LOW (ref 6.6–8.7)
PROT SERPL-MCNC: 5.9 G/DL — LOW (ref 6.6–8.7)
PROT UR-MCNC: NEGATIVE MG/DL — SIGNIFICANT CHANGE UP
PROT UR-MCNC: NEGATIVE MG/DL — SIGNIFICANT CHANGE UP
RBC # BLD: 3.97 M/UL — SIGNIFICANT CHANGE UP (ref 3.8–5.2)
RBC # BLD: 3.97 M/UL — SIGNIFICANT CHANGE UP (ref 3.8–5.2)
RBC # FLD: 20.2 % — HIGH (ref 10.3–14.5)
RBC # FLD: 20.2 % — HIGH (ref 10.3–14.5)
RBC CASTS # UR COMP ASSIST: 0 /HPF — SIGNIFICANT CHANGE UP (ref 0–4)
RBC CASTS # UR COMP ASSIST: 0 /HPF — SIGNIFICANT CHANGE UP (ref 0–4)
SAO2 % BLDV: 96.7 % — SIGNIFICANT CHANGE UP
SAO2 % BLDV: 96.7 % — SIGNIFICANT CHANGE UP
SODIUM SERPL-SCNC: 138 MMOL/L — SIGNIFICANT CHANGE UP (ref 135–145)
SODIUM SERPL-SCNC: 138 MMOL/L — SIGNIFICANT CHANGE UP (ref 135–145)
SP GR SPEC: 1.02 — SIGNIFICANT CHANGE UP (ref 1–1.03)
SP GR SPEC: 1.02 — SIGNIFICANT CHANGE UP (ref 1–1.03)
SQUAMOUS # UR AUTO: 1 /HPF — SIGNIFICANT CHANGE UP (ref 0–5)
SQUAMOUS # UR AUTO: 1 /HPF — SIGNIFICANT CHANGE UP (ref 0–5)
UROBILINOGEN FLD QL: 1 MG/DL — SIGNIFICANT CHANGE UP (ref 0.2–1)
UROBILINOGEN FLD QL: 1 MG/DL — SIGNIFICANT CHANGE UP (ref 0.2–1)
WBC # BLD: 4.34 K/UL — SIGNIFICANT CHANGE UP (ref 3.8–10.5)
WBC # BLD: 4.34 K/UL — SIGNIFICANT CHANGE UP (ref 3.8–10.5)
WBC # FLD AUTO: 4.34 K/UL — SIGNIFICANT CHANGE UP (ref 3.8–10.5)
WBC # FLD AUTO: 4.34 K/UL — SIGNIFICANT CHANGE UP (ref 3.8–10.5)
WBC UR QL: 1 /HPF — SIGNIFICANT CHANGE UP (ref 0–5)
WBC UR QL: 1 /HPF — SIGNIFICANT CHANGE UP (ref 0–5)

## 2023-12-06 PROCEDURE — 99285 EMERGENCY DEPT VISIT HI MDM: CPT

## 2023-12-06 RX ORDER — HYDROMORPHONE HYDROCHLORIDE 2 MG/ML
1 INJECTION INTRAMUSCULAR; INTRAVENOUS; SUBCUTANEOUS ONCE
Refills: 0 | Status: DISCONTINUED | OUTPATIENT
Start: 2023-12-06 | End: 2023-12-06

## 2023-12-06 RX ORDER — KETOROLAC TROMETHAMINE 30 MG/ML
30 SYRINGE (ML) INJECTION ONCE
Refills: 0 | Status: DISCONTINUED | OUTPATIENT
Start: 2023-12-06 | End: 2023-12-06

## 2023-12-06 RX ORDER — ONDANSETRON 8 MG/1
4 TABLET, FILM COATED ORAL ONCE
Refills: 0 | Status: COMPLETED | OUTPATIENT
Start: 2023-12-06 | End: 2023-12-06

## 2023-12-06 RX ORDER — SODIUM CHLORIDE 9 MG/ML
1850 INJECTION, SOLUTION INTRAVENOUS ONCE
Refills: 0 | Status: COMPLETED | OUTPATIENT
Start: 2023-12-06 | End: 2023-12-06

## 2023-12-06 RX ADMIN — HYDROMORPHONE HYDROCHLORIDE 1 MILLIGRAM(S): 2 INJECTION INTRAMUSCULAR; INTRAVENOUS; SUBCUTANEOUS at 21:56

## 2023-12-06 RX ADMIN — ONDANSETRON 4 MILLIGRAM(S): 8 TABLET, FILM COATED ORAL at 21:52

## 2023-12-06 RX ADMIN — SODIUM CHLORIDE 1850 MILLILITER(S): 9 INJECTION, SOLUTION INTRAVENOUS at 21:52

## 2023-12-06 RX ADMIN — Medication 30 MILLIGRAM(S): at 23:30

## 2023-12-06 NOTE — ED PROVIDER NOTE - CLINICAL SUMMARY MEDICAL DECISION MAKING FREE TEXT BOX
38-year-old female with stage IV breast cancer metastatic to brain, lung, liver and bone on active chemo s/p completion of cranial radiation, hx malignant pleural effusion (s/p chest tube 12/23/21), pericardial effusion w/tamponade s/p pericardiocentesis (12/28/21), pathologic compression fx, anxiety, anemia requiring multiple transfusions presents to the ED complaining of severe nausea, vomiting, diarrhea, abdominal pain since last night sent in by her oncologist at [facility] for abdominal imaging and symptom control. Will send C diff given watery diarrhea and recent antibiotics. Will get CT abd and pel and treat symptomatically. Suspect recurrent colitis vs new mets. 38-year-old female with stage IV breast cancer metastatic to brain, lung, liver and bone on active chemo s/p completion of cranial radiation, hx malignant pleural effusion (s/p chest tube 12/23/21), pericardial effusion w/tamponade s/p pericardiocentesis (12/28/21), pathologic compression fx, anxiety, anemia requiring multiple transfusions presents to the ED complaining of severe nausea, vomiting, diarrhea, abdominal pain since last night sent in by her oncologist at [facility] for abdominal imaging and symptom control. Will send C diff given watery diarrhea and recent antibiotics. Will get CT abd and pel and treat symptomatically. Suspect recurrent colitis vs new mets.

## 2023-12-06 NOTE — ED PROVIDER NOTE - OBJECTIVE STATEMENT
38-year-old female with stage IV breast cancer metastatic to brain, lung, liver and bone on active chemo s/p completion of cranial radiation, hx malignant pleural effusion (s/p chest tube 12/23/21), pericardial effusion w/tamponade s/p pericardiocentesis (12/28/21), pathologic compression fx, anxiety, anemia requiring multiple transfusions presents to the ED complaining of severe nausea, vomiting, diarrhea, abdominal pain since last night sent in by her oncologist at [facility] for abdominal imaging and symptom control. Was treated for colitis a month ago with ciprofloxacin and metronidazole. Denies fever, denies blood in stool or vomit. No meds at home. Unable to tolerate PO intake. Last chemo Nov 1st. Denies fever, chills, chest pain, shortness of breath, dizziness. +headache. 38-year-old female with stage IV breast cancer metastatic to brain, lung, liver and bone on active chemo s/p completion of cranial radiation, hx malignant pleural effusion (s/p chest tube 12/23/21), pericardial effusion w/tamponade s/p pericardiocentesis (12/28/21), pathologic compression fx, anxiety, anemia requiring multiple transfusions presents to the ED complaining of severe nausea, vomiting, diarrhea, abdominal pain since last night sent in by her oncologist at [facility] for abdominal imaging and symptom control. Was treated for colitis a month ago with ciprofloxacin and metronidazole. Denies fever, denies blood in stool or vomit. No meds at home. Unable to tolerate PO intake. Last chemo Nov 1st. Denies fever, chills, chest pain, shortness of breath, dizziness. +headache.

## 2023-12-06 NOTE — ED PROVIDER NOTE - PROGRESS NOTE DETAILS
Abdominal pain improved, nausea resolved, CT with colitis and gallbladder wall thickening and pericholecystic fluid, US negative, will dc with cipro and flagyl and probiotics for colitis.

## 2023-12-06 NOTE — ED ADULT TRIAGE NOTE - NS ED TRIAGE AVPU SCALE
Alert-The patient is alert, awake and responds to voice. The patient is oriented to time, place, and person. The triage nurse is able to obtain subjective information. Trilobed Flap Text: The defect edges were debeveled with a #15 scalpel blade.  Given the location of the defect and the proximity to free margins a trilobed flap was deemed most appropriate.  Using a sterile surgical marker, an appropriate trilobed flap drawn around the defect.    The area thus outlined was incised deep to adipose tissue with a #15 scalpel blade.  The skin margins were undermined to an appropriate distance in all directions utilizing iris scissors.

## 2023-12-06 NOTE — ED ADULT TRIAGE NOTE - CHIEF COMPLAINT QUOTE
pt states she was seen at [facility] on Monday, had 2units PRBC's, now having abdominal pain, nausea, diarrhea with chills. pt states she was seen at [facility] on Monday, had 2units PRBC's, now having abdominal pain, nausea, diarrhea with chills.

## 2023-12-06 NOTE — ED PROVIDER NOTE - NS ED ROS FT
General: No fever, chills.  Respiratory: No SOB  Cardiac: No chest pain  GI: see hpi  Neuro: + headache  MSK: No muscle pain, joint pain, back pain.  Psych: hx of anxiety  Endocrine: No heat/cold intolerance, no polyuria/polydipsia.  Heme: No easy bruising or bleeding.  Allergic: No pruritis, dermatitis, or environmental allergies.

## 2023-12-06 NOTE — ED PROVIDER NOTE - PATIENT PORTAL LINK FT
You can access the FollowMyHealth Patient Portal offered by [facility] by registering at the following website: [email or web address]. By joining [employer]’s FollowMyHealth portal, you will also be able to view your health information using other applications (apps) compatible with our system. You can access the FollowMyHealth Patient Portal offered by [facility] by registering at the following website: [email or web address]. By joining [employer]’s FollowMyHealth portal, you will also be able to view your health information using other applications (apps) compatible with our system.

## 2023-12-06 NOTE — ED PROVIDER NOTE - CARE PROVIDER_API CALL
Beckie Perera  Gastroenterology  [address], [facility] [address] [facility] [address]  [place] [address]  Phone: [phone]  Fax: [phone]  Follow Up Time: Routine   Beckie Perera  Gastroenterology  [address], [facility] [address]  [place] [address]  Phone: [phone]  Fax: [phone]  Follow Up Time: Routine

## 2023-12-06 NOTE — ED PROVIDER NOTE - NSFOLLOWUPINSTRUCTIONS_ED_ALL_ED_FT
- Follow up with GI.  - Follow up with your oncologist.   - Take ciprofloxacin and metronidazole twice a day for 5 days.   - Take probiotics three times a day.   - Return to ED for fever, chills, worsening abdominal pain, worsening nausea/vomiting, or any other new or worsening symptoms.

## 2023-12-06 NOTE — ED PROVIDER NOTE - ATTENDING CONTRIBUTION TO CARE
at the time of total hysterectomy 2010/yes 38 female metastatic breast cancer presenting with nausea, vomiting and diarrhea.  On exam abdomen soft nontender, nondistended, moist mucous membranes.  CT suggestive of colitis, treated supportively, feels better, discharged home on antibiotics.

## 2023-12-06 NOTE — ED PROVIDER NOTE - PROVIDER TOKENS
PROVIDER:[TOKEN:[[id number]],FOLLOWUP:[Routine]] PROVIDER:[TOKEN:[[id number]],FOLLOWUP:[Routine]]

## 2023-12-06 NOTE — ED ADULT NURSE NOTE - CHIEF COMPLAINT QUOTE
pt states she was seen at [facility] on Monday, had 2units PRBC's, now having abdominal pain, nausea, diarrhea with chills. pt states she was seen at [facility] on Monday, had 2units PRBC's, now having abdominal pain, nausea, diarrhea with chills.

## 2023-12-07 PROCEDURE — 82330 ASSAY OF CALCIUM: CPT

## 2023-12-07 PROCEDURE — 85025 COMPLETE CBC W/AUTO DIFF WBC: CPT

## 2023-12-07 PROCEDURE — 96375 TX/PRO/DX INJ NEW DRUG ADDON: CPT

## 2023-12-07 PROCEDURE — 76705 ECHO EXAM OF ABDOMEN: CPT | Mod: 26

## 2023-12-07 PROCEDURE — 83605 ASSAY OF LACTIC ACID: CPT

## 2023-12-07 PROCEDURE — 82803 BLOOD GASES ANY COMBINATION: CPT

## 2023-12-07 PROCEDURE — 84132 ASSAY OF SERUM POTASSIUM: CPT

## 2023-12-07 PROCEDURE — 81001 URINALYSIS AUTO W/SCOPE: CPT

## 2023-12-07 PROCEDURE — 74177 CT ABD & PELVIS W/CONTRAST: CPT | Mod: ME

## 2023-12-07 PROCEDURE — 76705 ECHO EXAM OF ABDOMEN: CPT

## 2023-12-07 PROCEDURE — 80053 COMPREHEN METABOLIC PANEL: CPT

## 2023-12-07 PROCEDURE — 99284 EMERGENCY DEPT VISIT MOD MDM: CPT | Mod: 25

## 2023-12-07 PROCEDURE — 36415 COLL VENOUS BLD VENIPUNCTURE: CPT

## 2023-12-07 PROCEDURE — 81025 URINE PREGNANCY TEST: CPT

## 2023-12-07 PROCEDURE — 74177 CT ABD & PELVIS W/CONTRAST: CPT | Mod: 26,ME

## 2023-12-07 PROCEDURE — G1004: CPT

## 2023-12-07 PROCEDURE — 85014 HEMATOCRIT: CPT

## 2023-12-07 PROCEDURE — 85018 HEMOGLOBIN: CPT

## 2023-12-07 PROCEDURE — 87086 URINE CULTURE/COLONY COUNT: CPT

## 2023-12-07 PROCEDURE — 84295 ASSAY OF SERUM SODIUM: CPT

## 2023-12-07 PROCEDURE — 82435 ASSAY OF BLOOD CHLORIDE: CPT

## 2023-12-07 PROCEDURE — 82947 ASSAY GLUCOSE BLOOD QUANT: CPT

## 2023-12-07 PROCEDURE — 96376 TX/PRO/DX INJ SAME DRUG ADON: CPT

## 2023-12-07 PROCEDURE — 96374 THER/PROPH/DIAG INJ IV PUSH: CPT | Mod: XU

## 2023-12-07 PROCEDURE — 83690 ASSAY OF LIPASE: CPT

## 2023-12-07 RX ORDER — METRONIDAZOLE 500 MG
1 TABLET ORAL
Qty: 10 | Refills: 0
Start: 2023-12-07 | End: 2023-12-11

## 2023-12-07 RX ORDER — CIPROFLOXACIN LACTATE 400MG/40ML
400 VIAL (ML) INTRAVENOUS ONCE
Refills: 0 | Status: COMPLETED | OUTPATIENT
Start: 2023-12-07 | End: 2023-12-07

## 2023-12-07 RX ORDER — METRONIDAZOLE 500 MG
500 TABLET ORAL ONCE
Refills: 0 | Status: COMPLETED | OUTPATIENT
Start: 2023-12-07 | End: 2023-12-07

## 2023-12-07 RX ORDER — HYDROMORPHONE HYDROCHLORIDE 2 MG/ML
2 INJECTION INTRAMUSCULAR; INTRAVENOUS; SUBCUTANEOUS ONCE
Refills: 0 | Status: DISCONTINUED | OUTPATIENT
Start: 2023-12-07 | End: 2023-12-07

## 2023-12-07 RX ORDER — L.ACIDOPH/B.ANIMALIS/B.LONGUM 15B CELL
1 CAPSULE ORAL
Qty: 15 | Refills: 0
Start: 2023-12-07 | End: 2023-12-11

## 2023-12-07 RX ORDER — CIPROFLOXACIN LACTATE 400MG/40ML
1 VIAL (ML) INTRAVENOUS
Qty: 10 | Refills: 0
Start: 2023-12-07 | End: 2023-12-11

## 2023-12-07 RX ADMIN — HYDROMORPHONE HYDROCHLORIDE 2 MILLIGRAM(S): 2 INJECTION INTRAMUSCULAR; INTRAVENOUS; SUBCUTANEOUS at 01:30

## 2023-12-07 RX ADMIN — HYDROMORPHONE HYDROCHLORIDE 2 MILLIGRAM(S): 2 INJECTION INTRAMUSCULAR; INTRAVENOUS; SUBCUTANEOUS at 00:50

## 2023-12-07 RX ADMIN — HYDROMORPHONE HYDROCHLORIDE 2 MILLIGRAM(S): 2 INJECTION INTRAMUSCULAR; INTRAVENOUS; SUBCUTANEOUS at 05:19

## 2023-12-07 RX ADMIN — Medication 100 MILLIGRAM(S): at 05:31

## 2023-12-07 RX ADMIN — Medication 200 MILLIGRAM(S): at 05:31

## 2023-12-07 RX ADMIN — Medication 30 MILLIGRAM(S): at 00:30

## 2023-12-07 NOTE — ED ADULT NURSE REASSESSMENT NOTE - NS ED NURSE REASSESS COMMENT FT1
Pt remains resting in bed. Pt pending CT. Pt remains on monitor. IV site assessed - dry intact transparent, flushing well, no abnormalities noted. No new CO pain or discomfort @ this time.

## 2023-12-09 LAB
CULTURE RESULTS: ABNORMAL
CULTURE RESULTS: ABNORMAL
SPECIMEN SOURCE: SIGNIFICANT CHANGE UP
SPECIMEN SOURCE: SIGNIFICANT CHANGE UP

## 2023-12-11 ENCOUNTER — APPOINTMENT (OUTPATIENT)
Dept: GASTROENTEROLOGY | Facility: CLINIC | Age: 38
End: 2023-12-11
Payer: MEDICARE

## 2023-12-11 DIAGNOSIS — R79.9 ABNORMAL FINDING OF BLOOD CHEMISTRY, UNSPECIFIED: ICD-10-CM

## 2023-12-11 PROCEDURE — 91110 GI TRC IMG INTRAL ESOPH-ILE: CPT

## 2023-12-12 PROBLEM — R79.9 BLOOD CHEMISTRY ABNORMALITY: Status: ACTIVE | Noted: 2023-12-05

## 2024-01-02 ENCOUNTER — INPATIENT (INPATIENT)
Facility: HOSPITAL | Age: 39
LOS: 7 days | Discharge: HOME | DRG: 598 | End: 2024-01-10
Attending: GENERAL ACUTE CARE HOSPITAL | Admitting: STUDENT IN AN ORGANIZED HEALTH CARE EDUCATION/TRAINING PROGRAM
Payer: MEDICARE

## 2024-01-02 VITALS
WEIGHT: 135.8 LBS | HEART RATE: 89 BPM | SYSTOLIC BLOOD PRESSURE: 100 MMHG | RESPIRATION RATE: 18 BRPM | DIASTOLIC BLOOD PRESSURE: 64 MMHG | HEIGHT: 65 IN | TEMPERATURE: 98 F | OXYGEN SATURATION: 96 %

## 2024-01-02 DIAGNOSIS — D64.9 ANEMIA, UNSPECIFIED: ICD-10-CM

## 2024-01-02 DIAGNOSIS — Z98.890 OTHER SPECIFIED POSTPROCEDURAL STATES: Chronic | ICD-10-CM

## 2024-01-02 DIAGNOSIS — Z90.89 ACQUIRED ABSENCE OF OTHER ORGANS: Chronic | ICD-10-CM

## 2024-01-02 LAB
ACANTHOCYTES BLD QL SMEAR: SLIGHT — SIGNIFICANT CHANGE UP
ACANTHOCYTES BLD QL SMEAR: SLIGHT — SIGNIFICANT CHANGE UP
ALBUMIN SERPL ELPH-MCNC: 3.4 G/DL — SIGNIFICANT CHANGE UP (ref 3.3–5.2)
ALBUMIN SERPL ELPH-MCNC: 3.4 G/DL — SIGNIFICANT CHANGE UP (ref 3.3–5.2)
ALLERGY+IMMUNOLOGY DIAG STUDY NOTE: SIGNIFICANT CHANGE UP
ALLERGY+IMMUNOLOGY DIAG STUDY NOTE: SIGNIFICANT CHANGE UP
ALP SERPL-CCNC: 192 U/L — HIGH (ref 40–120)
ALP SERPL-CCNC: 192 U/L — HIGH (ref 40–120)
ALT FLD-CCNC: 16 U/L — SIGNIFICANT CHANGE UP
ALT FLD-CCNC: 16 U/L — SIGNIFICANT CHANGE UP
ANION GAP SERPL CALC-SCNC: 12 MMOL/L — SIGNIFICANT CHANGE UP (ref 5–17)
ANION GAP SERPL CALC-SCNC: 12 MMOL/L — SIGNIFICANT CHANGE UP (ref 5–17)
ANISOCYTOSIS BLD QL: SIGNIFICANT CHANGE UP
ANISOCYTOSIS BLD QL: SIGNIFICANT CHANGE UP
APTT BLD: 31.8 SEC — SIGNIFICANT CHANGE UP (ref 24.5–35.6)
APTT BLD: 31.8 SEC — SIGNIFICANT CHANGE UP (ref 24.5–35.6)
AST SERPL-CCNC: 35 U/L — HIGH
AST SERPL-CCNC: 35 U/L — HIGH
BASOPHILS # BLD AUTO: 0 K/UL — SIGNIFICANT CHANGE UP (ref 0–0.2)
BASOPHILS # BLD AUTO: 0 K/UL — SIGNIFICANT CHANGE UP (ref 0–0.2)
BASOPHILS NFR BLD AUTO: 0 % — SIGNIFICANT CHANGE UP (ref 0–2)
BASOPHILS NFR BLD AUTO: 0 % — SIGNIFICANT CHANGE UP (ref 0–2)
BILIRUB SERPL-MCNC: 0.9 MG/DL — SIGNIFICANT CHANGE UP (ref 0.4–2)
BILIRUB SERPL-MCNC: 0.9 MG/DL — SIGNIFICANT CHANGE UP (ref 0.4–2)
BLD GP AB SCN SERPL QL: SIGNIFICANT CHANGE UP
BLD GP AB SCN SERPL QL: SIGNIFICANT CHANGE UP
BUN SERPL-MCNC: 13.1 MG/DL — SIGNIFICANT CHANGE UP (ref 8–20)
BUN SERPL-MCNC: 13.1 MG/DL — SIGNIFICANT CHANGE UP (ref 8–20)
CALCIUM SERPL-MCNC: 9 MG/DL — SIGNIFICANT CHANGE UP (ref 8.4–10.5)
CALCIUM SERPL-MCNC: 9 MG/DL — SIGNIFICANT CHANGE UP (ref 8.4–10.5)
CHLORIDE SERPL-SCNC: 105 MMOL/L — SIGNIFICANT CHANGE UP (ref 96–108)
CHLORIDE SERPL-SCNC: 105 MMOL/L — SIGNIFICANT CHANGE UP (ref 96–108)
CO2 SERPL-SCNC: 23 MMOL/L — SIGNIFICANT CHANGE UP (ref 22–29)
CO2 SERPL-SCNC: 23 MMOL/L — SIGNIFICANT CHANGE UP (ref 22–29)
CREAT SERPL-MCNC: 0.47 MG/DL — LOW (ref 0.5–1.3)
CREAT SERPL-MCNC: 0.47 MG/DL — LOW (ref 0.5–1.3)
DIR ANTIGLOB POLYSPECIFIC INTERPRETATION: SIGNIFICANT CHANGE UP
DIR ANTIGLOB POLYSPECIFIC INTERPRETATION: SIGNIFICANT CHANGE UP
EGFR: 126 ML/MIN/1.73M2 — SIGNIFICANT CHANGE UP
EGFR: 126 ML/MIN/1.73M2 — SIGNIFICANT CHANGE UP
EOSINOPHIL # BLD AUTO: 0 K/UL — SIGNIFICANT CHANGE UP (ref 0–0.5)
EOSINOPHIL # BLD AUTO: 0 K/UL — SIGNIFICANT CHANGE UP (ref 0–0.5)
EOSINOPHIL NFR BLD AUTO: 0 % — SIGNIFICANT CHANGE UP (ref 0–6)
EOSINOPHIL NFR BLD AUTO: 0 % — SIGNIFICANT CHANGE UP (ref 0–6)
FERRITIN SERPL-MCNC: 568 NG/ML — HIGH (ref 15–150)
FERRITIN SERPL-MCNC: 568 NG/ML — HIGH (ref 15–150)
GIANT PLATELETS BLD QL SMEAR: PRESENT — SIGNIFICANT CHANGE UP
GIANT PLATELETS BLD QL SMEAR: PRESENT — SIGNIFICANT CHANGE UP
GLUCOSE SERPL-MCNC: 83 MG/DL — SIGNIFICANT CHANGE UP (ref 70–99)
GLUCOSE SERPL-MCNC: 83 MG/DL — SIGNIFICANT CHANGE UP (ref 70–99)
HCT VFR BLD CALC: 23.3 % — LOW (ref 34.5–45)
HCT VFR BLD CALC: 23.3 % — LOW (ref 34.5–45)
HGB BLD-MCNC: 7.5 G/DL — LOW (ref 11.5–15.5)
HGB BLD-MCNC: 7.5 G/DL — LOW (ref 11.5–15.5)
HYPOCHROMIA BLD QL: SLIGHT — SIGNIFICANT CHANGE UP
HYPOCHROMIA BLD QL: SLIGHT — SIGNIFICANT CHANGE UP
INR BLD: 1.07 RATIO — SIGNIFICANT CHANGE UP (ref 0.85–1.18)
INR BLD: 1.07 RATIO — SIGNIFICANT CHANGE UP (ref 0.85–1.18)
IRON SATN MFR SERPL: 12 % — LOW (ref 14–50)
IRON SATN MFR SERPL: 12 % — LOW (ref 14–50)
IRON SATN MFR SERPL: 38 UG/DL — SIGNIFICANT CHANGE UP (ref 37–145)
IRON SATN MFR SERPL: 38 UG/DL — SIGNIFICANT CHANGE UP (ref 37–145)
LYMPHOCYTES # BLD AUTO: 0.41 K/UL — LOW (ref 1–3.3)
LYMPHOCYTES # BLD AUTO: 0.41 K/UL — LOW (ref 1–3.3)
LYMPHOCYTES # BLD AUTO: 12.2 % — LOW (ref 13–44)
LYMPHOCYTES # BLD AUTO: 12.2 % — LOW (ref 13–44)
MACROCYTES BLD QL: SIGNIFICANT CHANGE UP
MACROCYTES BLD QL: SIGNIFICANT CHANGE UP
MANUAL SMEAR VERIFICATION: SIGNIFICANT CHANGE UP
MANUAL SMEAR VERIFICATION: SIGNIFICANT CHANGE UP
MCHC RBC-ENTMCNC: 29.4 PG — SIGNIFICANT CHANGE UP (ref 27–34)
MCHC RBC-ENTMCNC: 29.4 PG — SIGNIFICANT CHANGE UP (ref 27–34)
MCHC RBC-ENTMCNC: 32.2 GM/DL — SIGNIFICANT CHANGE UP (ref 32–36)
MCHC RBC-ENTMCNC: 32.2 GM/DL — SIGNIFICANT CHANGE UP (ref 32–36)
MCV RBC AUTO: 91.4 FL — SIGNIFICANT CHANGE UP (ref 80–100)
MCV RBC AUTO: 91.4 FL — SIGNIFICANT CHANGE UP (ref 80–100)
METAMYELOCYTES # FLD: 1.7 % — HIGH (ref 0–0)
METAMYELOCYTES # FLD: 1.7 % — HIGH (ref 0–0)
MONOCYTES # BLD AUTO: 0.26 K/UL — SIGNIFICANT CHANGE UP (ref 0–0.9)
MONOCYTES # BLD AUTO: 0.26 K/UL — SIGNIFICANT CHANGE UP (ref 0–0.9)
MONOCYTES NFR BLD AUTO: 7.8 % — SIGNIFICANT CHANGE UP (ref 2–14)
MONOCYTES NFR BLD AUTO: 7.8 % — SIGNIFICANT CHANGE UP (ref 2–14)
NEUTROPHILS # BLD AUTO: 2.59 K/UL — SIGNIFICANT CHANGE UP (ref 1.8–7.4)
NEUTROPHILS # BLD AUTO: 2.59 K/UL — SIGNIFICANT CHANGE UP (ref 1.8–7.4)
NEUTROPHILS NFR BLD AUTO: 77.4 % — HIGH (ref 43–77)
NEUTROPHILS NFR BLD AUTO: 77.4 % — HIGH (ref 43–77)
OVALOCYTES BLD QL SMEAR: SLIGHT — SIGNIFICANT CHANGE UP
OVALOCYTES BLD QL SMEAR: SLIGHT — SIGNIFICANT CHANGE UP
PLAT MORPH BLD: ABNORMAL
PLAT MORPH BLD: ABNORMAL
PLATELET # BLD AUTO: 268 K/UL — SIGNIFICANT CHANGE UP (ref 150–400)
PLATELET # BLD AUTO: 268 K/UL — SIGNIFICANT CHANGE UP (ref 150–400)
POIKILOCYTOSIS BLD QL AUTO: SIGNIFICANT CHANGE UP
POIKILOCYTOSIS BLD QL AUTO: SIGNIFICANT CHANGE UP
POLYCHROMASIA BLD QL SMEAR: SIGNIFICANT CHANGE UP
POLYCHROMASIA BLD QL SMEAR: SIGNIFICANT CHANGE UP
POTASSIUM SERPL-MCNC: 4.3 MMOL/L — SIGNIFICANT CHANGE UP (ref 3.5–5.3)
POTASSIUM SERPL-MCNC: 4.3 MMOL/L — SIGNIFICANT CHANGE UP (ref 3.5–5.3)
POTASSIUM SERPL-SCNC: 4.3 MMOL/L — SIGNIFICANT CHANGE UP (ref 3.5–5.3)
POTASSIUM SERPL-SCNC: 4.3 MMOL/L — SIGNIFICANT CHANGE UP (ref 3.5–5.3)
PROT SERPL-MCNC: 5.6 G/DL — LOW (ref 6.6–8.7)
PROT SERPL-MCNC: 5.6 G/DL — LOW (ref 6.6–8.7)
PROTHROM AB SERPL-ACNC: 11.8 SEC — SIGNIFICANT CHANGE UP (ref 9.5–13)
PROTHROM AB SERPL-ACNC: 11.8 SEC — SIGNIFICANT CHANGE UP (ref 9.5–13)
RBC # BLD: 2.55 M/UL — LOW (ref 3.8–5.2)
RBC # BLD: 2.55 M/UL — LOW (ref 3.8–5.2)
RBC # FLD: 25.8 % — HIGH (ref 10.3–14.5)
RBC # FLD: 25.8 % — HIGH (ref 10.3–14.5)
RBC BLD AUTO: ABNORMAL
RBC BLD AUTO: ABNORMAL
SCHISTOCYTES BLD QL AUTO: SLIGHT — SIGNIFICANT CHANGE UP
SCHISTOCYTES BLD QL AUTO: SLIGHT — SIGNIFICANT CHANGE UP
SODIUM SERPL-SCNC: 140 MMOL/L — SIGNIFICANT CHANGE UP (ref 135–145)
SODIUM SERPL-SCNC: 140 MMOL/L — SIGNIFICANT CHANGE UP (ref 135–145)
TIBC SERPL-MCNC: 315 UG/DL — SIGNIFICANT CHANGE UP (ref 220–430)
TIBC SERPL-MCNC: 315 UG/DL — SIGNIFICANT CHANGE UP (ref 220–430)
TRANSFERRIN SERPL-MCNC: 220 MG/DL — SIGNIFICANT CHANGE UP (ref 192–382)
TRANSFERRIN SERPL-MCNC: 220 MG/DL — SIGNIFICANT CHANGE UP (ref 192–382)
VARIANT LYMPHS # BLD: 0.9 % — SIGNIFICANT CHANGE UP (ref 0–6)
VARIANT LYMPHS # BLD: 0.9 % — SIGNIFICANT CHANGE UP (ref 0–6)
WBC # BLD: 3.35 K/UL — LOW (ref 3.8–10.5)
WBC # BLD: 3.35 K/UL — LOW (ref 3.8–10.5)
WBC # FLD AUTO: 3.35 K/UL — LOW (ref 3.8–10.5)
WBC # FLD AUTO: 3.35 K/UL — LOW (ref 3.8–10.5)

## 2024-01-02 PROCEDURE — 86077 PHYS BLOOD BANK SERV XMATCH: CPT

## 2024-01-02 PROCEDURE — 99285 EMERGENCY DEPT VISIT HI MDM: CPT

## 2024-01-02 PROCEDURE — 99222 1ST HOSP IP/OBS MODERATE 55: CPT

## 2024-01-02 PROCEDURE — 99233 SBSQ HOSP IP/OBS HIGH 50: CPT

## 2024-01-02 RX ORDER — PANTOPRAZOLE SODIUM 20 MG/1
40 TABLET, DELAYED RELEASE ORAL DAILY
Refills: 0 | Status: DISCONTINUED | OUTPATIENT
Start: 2024-01-02 | End: 2024-01-05

## 2024-01-02 RX ORDER — HYDROMORPHONE HYDROCHLORIDE 2 MG/ML
0.5 INJECTION INTRAMUSCULAR; INTRAVENOUS; SUBCUTANEOUS
Refills: 0 | Status: DISCONTINUED | OUTPATIENT
Start: 2024-01-02 | End: 2024-01-03

## 2024-01-02 RX ORDER — MEMANTINE HYDROCHLORIDE 10 MG/1
10 TABLET ORAL
Refills: 0 | Status: DISCONTINUED | OUTPATIENT
Start: 2024-01-02 | End: 2024-01-10

## 2024-01-02 RX ORDER — GLYCERIN ADULT
1 SUPPOSITORY, RECTAL RECTAL AT BEDTIME
Refills: 0 | Status: DISCONTINUED | OUTPATIENT
Start: 2024-01-02 | End: 2024-01-10

## 2024-01-02 RX ORDER — POLYETHYLENE GLYCOL 3350 17 G/17G
17 POWDER, FOR SOLUTION ORAL AT BEDTIME
Refills: 0 | Status: DISCONTINUED | OUTPATIENT
Start: 2024-01-02 | End: 2024-01-10

## 2024-01-02 RX ORDER — HYDROMORPHONE HYDROCHLORIDE 2 MG/ML
2 INJECTION INTRAMUSCULAR; INTRAVENOUS; SUBCUTANEOUS ONCE
Refills: 0 | Status: DISCONTINUED | OUTPATIENT
Start: 2024-01-02 | End: 2024-01-02

## 2024-01-02 RX ORDER — HYDROMORPHONE HYDROCHLORIDE 2 MG/ML
1 INJECTION INTRAMUSCULAR; INTRAVENOUS; SUBCUTANEOUS EVERY 4 HOURS
Refills: 0 | Status: DISCONTINUED | OUTPATIENT
Start: 2024-01-02 | End: 2024-01-03

## 2024-01-02 RX ORDER — LANOLIN ALCOHOL/MO/W.PET/CERES
3 CREAM (GRAM) TOPICAL AT BEDTIME
Refills: 0 | Status: DISCONTINUED | OUTPATIENT
Start: 2024-01-02 | End: 2024-01-10

## 2024-01-02 RX ORDER — FOLIC ACID 0.8 MG
1 TABLET ORAL DAILY
Refills: 0 | Status: DISCONTINUED | OUTPATIENT
Start: 2024-01-02 | End: 2024-01-10

## 2024-01-02 RX ORDER — ONDANSETRON 8 MG/1
4 TABLET, FILM COATED ORAL EVERY 8 HOURS
Refills: 0 | Status: DISCONTINUED | OUTPATIENT
Start: 2024-01-02 | End: 2024-01-10

## 2024-01-02 RX ORDER — ACETAMINOPHEN 500 MG
650 TABLET ORAL EVERY 6 HOURS
Refills: 0 | Status: DISCONTINUED | OUTPATIENT
Start: 2024-01-02 | End: 2024-01-10

## 2024-01-02 RX ORDER — METHADONE HYDROCHLORIDE 40 MG/1
10 TABLET ORAL
Refills: 0 | Status: DISCONTINUED | OUTPATIENT
Start: 2024-01-02 | End: 2024-01-09

## 2024-01-02 RX ORDER — HYDROCORTISONE 1 %
1 OINTMENT (GRAM) TOPICAL AT BEDTIME
Refills: 0 | Status: DISCONTINUED | OUTPATIENT
Start: 2024-01-02 | End: 2024-01-10

## 2024-01-02 RX ORDER — SODIUM CHLORIDE 9 MG/ML
3 INJECTION INTRAMUSCULAR; INTRAVENOUS; SUBCUTANEOUS EVERY 8 HOURS
Refills: 0 | Status: DISCONTINUED | OUTPATIENT
Start: 2024-01-02 | End: 2024-01-10

## 2024-01-02 RX ORDER — METHADONE HYDROCHLORIDE 40 MG/1
15 TABLET ORAL AT BEDTIME
Refills: 0 | Status: DISCONTINUED | OUTPATIENT
Start: 2024-01-02 | End: 2024-01-05

## 2024-01-02 RX ADMIN — HYDROMORPHONE HYDROCHLORIDE 2 MILLIGRAM(S): 2 INJECTION INTRAMUSCULAR; INTRAVENOUS; SUBCUTANEOUS at 20:40

## 2024-01-02 RX ADMIN — HYDROMORPHONE HYDROCHLORIDE 2 MILLIGRAM(S): 2 INJECTION INTRAMUSCULAR; INTRAVENOUS; SUBCUTANEOUS at 18:19

## 2024-01-02 RX ADMIN — HYDROMORPHONE HYDROCHLORIDE 1 MILLIGRAM(S): 2 INJECTION INTRAMUSCULAR; INTRAVENOUS; SUBCUTANEOUS at 23:59

## 2024-01-02 NOTE — ED PROVIDER NOTE - OBJECTIVE STATEMENT
38 year old female with PMH metastatic breast CA presents with anemia. the pt was seen her ein November for new onset dementia, GI performed both endoscopy and colonoscopy. Endoscopy revelaed gastritis, while colonoscopy revealed internal hemorrhoids but was limited by inadequate bowel prep, and if Hgb continued ot drop GI recommended repeat colonosocpy as well as capsule endoscope. However pt was stable and was discharged for follow up. Last chemo was 11/29, and pt hgb in december was 10. pt reports several days of severe fatigue, outpt labs showed hgb dropped to 7. Pt denies black or bloody stools, no abd pain 38 year old female with PMH metastatic breast CA presents with anemia. the pt was seen here in November for new onset anemia, GI performed both endoscopy and colonoscopy. Endoscopy revelaed gastritis, while colonoscopy revealed internal hemorrhoids but was limited by inadequate bowel prep, and if Hgb continued ot drop GI recommended repeat colonosocpy as well as capsule endoscope. However pt was stable and was discharged for follow up. Last chemo was 11/29, and pt hgb in december was 10. pt reports several days of severe fatigue, outpt labs showed hgb dropped to 7. Pt denies black or bloody stools, no abd pain

## 2024-01-02 NOTE — ED PROVIDER NOTE - GASTROINTESTINAL NEGATIVE STATEMENT, MLM
PAST SURGICAL HISTORY:  History of repair of hiatal hernia     S/P gastric bypass        no abdominal pain, no bloating, no constipation, no diarrhea, no nausea and no vomiting. no

## 2024-01-02 NOTE — PATIENT PROFILE ADULT - FALL HARM RISK - UNIVERSAL INTERVENTIONS
Bed in lowest position, wheels locked, appropriate side rails in place/Call bell, personal items and telephone in reach/Instruct patient to call for assistance before getting out of bed or chair/Non-slip footwear when patient is out of bed/[place] to call system/Physically safe environment - no spills, clutter or unnecessary equipment/Purposeful Proactive Rounding/Room/bathroom lighting operational, light cord in reach Bed in lowest position, wheels locked, appropriate side rails in place/Call bell, personal items and telephone in reach/Instruct patient to call for assistance before getting out of bed or chair/Non-slip footwear when patient is out of bed/[place] to call system/Physically safe environment - no spills, clutter or unnecessary equipment/Purposeful Proactive Rounding/Room/bathroom lighting operational, light cord in reach

## 2024-01-02 NOTE — ED ADULT NURSE NOTE - OBJECTIVE STATEMENT
PT care acquired at 1800. Pt is A&Ox4 and is resting in bed. Pt arrives after being referred by PCP for having hemoglobin of 7.4. Pt states she has pmhx of stage 4 breast cancer. Pt states she has noted red blood in stool. Pt denies sob. Pt denies dizziness. Lung sounds clear bilaterally. Pt is nsr on the cardiac monitor. Pt breathing unlabored on room air. VSS.

## 2024-01-02 NOTE — ED ADULT NURSE REASSESSMENT NOTE - NS ED NURSE REASSESS COMMENT FT1
Assuming care of pt from VADIM Ibarra. Pt AOx4, RR even and nonlabored, pt on RA, /CM. No distress noted. Pt updated on plan of care, all questions answered. Safety maintained.

## 2024-01-02 NOTE — ED ADULT NURSE NOTE - NSFALLRISKINTERV_ED_ALL_ED
Communicate fall risk and risk factors to all staff, patient, and family/Provide visual cue: yellow wristband, yellow gown, etc/Reinforce activity limits and safety measures with patient and family/Call bell, personal items and telephone in reach/Instruct patient to call for assistance before getting out of bed/chair/stretcher/Non-slip footwear applied when patient is off stretcher/[place] to call system/Physically safe environment - no spills, clutter or unnecessary equipment/Purposeful Proactive Rounding/Room/bathroom lighting operational, light cord in reach Communicate fall risk and risk factors to all staff, patient, and family/Provide visual cue: yellow wristband, yellow gown, etc/Reinforce activity limits and safety measures with patient and family/Call bell, personal items and telephone in reach/Instruct patient to call for assistance before getting out of bed/chair/stretcher/Non-slip footwear applied when patient is off stretcher/[place] to call system/Physically safe environment - no spills, clutter or unnecessary equipment/Purposeful Proactive Rounding/Room/bathroom lighting operational, light cord in reach

## 2024-01-02 NOTE — ED ADULT TRIAGE NOTE - BP NONINVASIVE SYSTOLIC (MM HG)
Initial Clinical Review    Admission: Date/Time/Statement: 8/25/2018  2348 OBSERVATION  AND CHANGED TO INPATIENT 8/28/2018  1215 RE:  Patient admitted initially with back pain, now has CRP >90 and ESR is 64 with new foot drop, blood cultures are ordered and infectious discitis is being work up, neurosurgery is involved  Start   Ordered   08/28/18 1215  Inpatient Admission Once     Transfer Service: General Medicine       Question Answer Comment   Admitting Physician TORTSEN WALLACE    Level of Care Med Surg    Estimated length of stay More than 2 Midnights    Certification I certify that inpatient services are medically necessary for this patient for a duration of greater than two midnights  See H&P and MD Progress Notes for additional information about the patient's course of treatment  08/28/18 1214             ED: Date/Time/Mode of Arrival:   ED Arrival Information     Expected Arrival Acuity Means of Arrival Escorted By Service Admission Type    - 8/25/2018 21:20 Urgent Stretcher Spouse General Medicine Urgent    Arrival Complaint    BACK PAIN          Chief Complaint:   Chief Complaint   Patient presents with    Back Pain     Pt presents to the ED with severe exacerbation of back pain  Pt reports hx of L5 bulging disc  Pt states pain seems similiar  Pt reports potentional over exertion this week from exercise and working with a sledge hammer  Last med was ibuprofen 5 hrs ago  History of Illness: 62 y  o  male who presents with lower back pain  Patient has a history of a bulging disc at L5 which was reportedly repaired 5 years ago  He notes lower back pain over the past 6 months with some sciatica   He reports that about 5 days he had done a lot of yard work, was working with a sledge hammer and then yesterday he got on a plane from [place], where he resides, to come to the area and about an hour and a half into the flight he developed severe lower back pain with some radiation of the pain to his right buttock and some right foot numbness  He reports that he has had difficulty ambulating since due to the pain  He denies any recent fevers/ chills, abdominal pain, bowel or bladder changes  He notes worsening of his pain with movement  He received a total of 16 mg of IV morphine in the ED as well as Flexeril and a lidocaine patch and noticed some improvement in his pain    8/26- still with severe pains lower back with muscle spasms    8/27- Patient is still having a lot of back pain with any movement although he has only moderate/5/10 pain if he is lying completely still  The medications he is receiving provide some benefit but he cannot get out of bed and ambulate  He had to use the bedpan to have a bowel movement and is using the urinal   He also notes that he started having pain in his left foot extending on the side of his great toe and under the ball of his foot today  ED Vital Signs:   ED Triage Vitals [08/25/18 2125]   Temperature Pulse Respirations Blood Pressure SpO2   98 °F (36 7 °C) 80 18 138/74 100 %      Temp Source Heart Rate Source Patient Position - Orthostatic VS BP Location FiO2 (%)   Oral Monitor Sitting Right arm --      Pain Score       Worst Possible Pain        Wt Readings from Last 1 Encounters:   08/25/18 75 5 kg (166 lb 7 2 oz)       Vital Signs (abnormal):   08/26/18 0039  98 5 °F (36 9 °C)  99  18  131/68  93  98 %  Nasal cannula  Lying     08/28/18 0658  98 3 °F (36 8 °C)  90  18  105/56  73  94 %  None (Room air)  Lying   08/27/18 2336  98 5 °F (36 9 °C)  91  18  107/51  74  93 %  None (Room air)  Lying         Abnormal Labs/Diagnostic Test Results:   8/25-  Na 133, K 3 3  Cl 98  Total bilirubin 1 70  Alkaline phosphatase 143  Alt 103  ast 116  Xray lumbar spine - Degenerative disease as above   No acute findings    The L5-S1 disc space is not well visualized in profile   If there is any clinical concern for discitis at L5-S1, CT would be more sensitive    8/26/2018- Platelets 61  Na 135  K 3 3  Cl 98  Calcium 8   ast 93  Alt 90  Albumin 2 9  Total bilirubin 1 50    8/27/2018- Platelets 55  Wbc 6 25     Ct lumbar spine - Multilevel degenerative changes   Endplate changes most severe at the L5-S1 level   No phlegmon at L5-S1 to suggest changes are related to discitis/osteomyelitis   If there is clinical concern for infection, serologic exclusion suggested    8/28/2018-  CRP >90 0    MRI lumbar spine - There is a rind of abnormal enhancement surrounding the L5-S1 disc space primarily anteriorly and laterally   Subtle fluid signal within the L5-S1 disc also noted   The possibility of discitis cannot be excluded at this level  Spondylotic degenerative changes are noted as described above   Moderate central and bilateral foraminal narrowing noted at L4-5 and L5-S1    ED Treatment:   Medication Administration from 08/25/2018 2120 to 08/26/2018 0031       Date/Time Order Dose Route Action Comments     08/25/2018 2206 morphine (PF) 10 mg/mL injection 8 mg 8 mg Intravenous Given      08/25/2018 2158 cyclobenzaprine (FLEXERIL) tablet 5 mg 5 mg Oral Given      08/25/2018 2218 lidocaine (LIDODERM) 5 % patch 1 patch 1 patch Transdermal Medication Applied      08/25/2018 2319 morphine (PF) 10 mg/mL injection 8 mg 8 mg Intravenous Given           Past Medical/Surgical History: Active Ambulatory Problems     Diagnosis Date Noted    Hyperlipidemia     Hypothyroidism      Resolved Ambulatory Problems     Diagnosis Date Noted    No Resolved Ambulatory Problems     Past Medical History:   Diagnosis Date    Hyperlipidemia     Hypertension     Hypothyroidism     Lower back pain        Admitting Diagnosis: Back pain [M54 9]  Intractable low back pain [M54 5]    Age/Sex: 62 y o  male    Assessment/Plan:   Intractable low back pain - today 8/28 concern is for discitis  Assessment & Plan     · Presented with intractable lower back pain  ?  History of lower back pain and surgery 5 years ago for bulging disc at L5  · Pain control with lidocaine patch, prn tramadol, prn Robaxin and Aqua K pad    ? Avoid Tylenol given elevated LFTs  · Obtain x-ray of L-spine  ? Patient requesting MRI of lumbar spine; consider further imaging if pain does not improve/ worsens  · PT evaluation            Elevated LFTs   Assessment & Plan     · Patient reports known h/o elevated LFTs which has been attributed to his alcohol use  · Patient currently in 12 step program for his alcohol use  · Denies abdominal pain  · Continue to monitor  Alcohol cessation            Thrombocytopenia (HCC)   Assessment & Plan     · Platelet count 72  Possibly due to alcohol use  · Repeat CBC in AM           Hypothyroidism   Assessment & Plan     · Taking [place] 30 mg daily  Substitute levothyroxine while inpatient            Hypertension   Assessment & Plan     · BP acceptable  · Continue amlodipine           Hyperlipidemia   Assessment & Plan     · Reportedly taking atorvastatin, unable to recall dosage           Hypokalemia   Assessment & Plan     · Replete  Repeat BMP in AM              Admission Orders:  8/25/2018  2348 OBSERVATION  AND CHANGED TO INPATIENT 8/28/2018  1215     Scheduled Meds:   Current Facility-Administered Medications:  amLODIPine 5 mg Oral Daily   levothyroxine 50 mcg Oral Early Morning   lidocaine 1 patch Transdermal Daily   melatonin 3 mg Oral HS   menthol-methyl salicylate  Apply externally 4x Daily PRN   methocarbamol 750 mg Oral Q6H ANIA   morphine injection 2 mg Intravenous Q4H PRN   naproxen 500 mg Oral BID With Meals   ondansetron 4 mg Intravenous Q6H PRN   oxyCODONE 5 mg Oral Q4H PRN   pantoprazole 40 mg Oral Early Morning   traMADol 50 mg Oral Q6H PRN     Continuous Infusions:    PRN Meds: menthol-methyl salicylate- used x 1 8/61    morphine injection  2 mg iv - used x 1 8/26; x 1 - 8/27    oxyCODONE 5 mg - used x 2 on 8/26; x 3 on 8/27; x 1 thus far 8/28    traMADol - used x 1 8/26  Methocarbamol - used x 2 8/26    OTHER ORDERS:scds  8/28- MRI lumbar spine  8/27- consult neurosurgery  PT-  Recommends post acute IP rehab      8/27- per neurosurgery -  Acute low back pain, concern for re-herniation  Low likelihood of discitis but will r/o with imaging  Plan:  MRI l/s with and without contrast  ESR/CRP    8/27/2018  Per ID - Abnormal MRI lumbar spine  Patient presented with acute low back pain  X-ray and CT more suggestive of degenerative changes  MRI showing enhancement at L5-S1 disc space, diskitis cannot be excluded  ESR elevated at 64, CRP greater than 90  Patient with no associated symptoms such as fevers or chills  No leukocytosis  At this point, unclear if we are dealing with infectious process or not  Patient has remained clinically well off antibiotics     -continue to observe off antibiotics for now  -recommend IR guided biopsy  Would send for bacterial, fungal, AFB cultures   -check blood cultures x2 sets today  -neurosurgery follow-up ongoing     2  Acute low back pain  Likely related to #1  MRI does show mild bulge at L4  Also moderate narrowing at L4-5 and L5-S1  This may be the cause of back pain  However, there was also enhancement noted at L5-S1 and cannot rule out diskitis  As stated above, we need to rule out infection  No recent trauma       -plan as above  -pain management per primary team  -monitor back pain in neuro exam     3  Abnormal LFTs  As per patient, this is not a new finding  May be related to his history of extensive alcohol abuse  Recommend outpatient workup and monitoring      4  Thrombocytopenia  Platelets on low side but remains stable  This may also be related to extensive alcohol abuse history  Patient is not from this area so has not had any prior imaging done here  No associated fevers, chills to suggest acute infectious process      5   Lumbar disc herniation with spinal stenosis    With history L5 laminectomy about 5 years ago       Thank you,  [facility]  Utilization Review Department  Phone: [phone]; Fax [phone]  ATTENTION: Please call with any questions or concerns to [phone]  and carefully follow the prompts so that you are directed to the right person  Send all requests for admission clinical reviews, approved or denied determinations and any other requests to fax [phone]   All voicemails are confidential  100

## 2024-01-02 NOTE — ED PROVIDER NOTE - CADM POA CENTRAL LINE
BRIEF OPERATIVE NOTE    Date of Procedure: 7/17/2019   Preoperative Diagnosis: UNILATERAL PRIMARY OSTEOARTHRITIS, RIGHT KNEE  Postoperative Diagnosis: UNILATERAL PRIMARY OSTEOARTHRITIS, RIGHT KNEE    Procedure(s):  RIGHT TOTAL KNEE ARTHROPLASTY,  Surgeon(s) and Role:     Nabor Gibbs MD - Primary         Surgical Assistant: nine    Surgical Staff:  Circ-1: Neris Flores RN  Physician Assistant: John Hunt PA-C  Surg Asst-1: Jesus Miranda  Event Time In Time Out   Incision Start 1104    Incision Close 1244      Anesthesia: General   Estimated Blood Loss: 20cc  Specimens: * No specimens in log *   Findings: severe DJD   Complications: none  Implants:   Implant Name Type Inv.  Item Serial No.  Lot No. LRB No. Used Action   PAT ASYM MTL-BK 11MM SZ A40 -- TRIATHLON - [id number]  PAT ASYM MTL-BK 11MM SZ A40 -- TRIATHLON  [employer] HOW [id number] Right 1 Implanted   COMPNT FEM CR TRIATHLN 6 R PA --  - [id number]  COMPNT FEM CR TRIATHLN 6 R PA --   [employer] HOW [id number] Right 1 Implanted   TIBIAL BEARING INSERT - CR    [employer] [id number] Right 1 Implanted   BASEPLT TIB PC TRITNM SZ 7 -- TRIATHLON - [id number]  BASEPLT TIB PC TRITNM SZ 7 -- TRIATHLON  [employer]LLY [employer] [facility] ORTHOPEDICS HOW [id number] Right 1 Implanted No

## 2024-01-03 LAB
ANION GAP SERPL CALC-SCNC: 10 MMOL/L — SIGNIFICANT CHANGE UP (ref 5–17)
ANION GAP SERPL CALC-SCNC: 10 MMOL/L — SIGNIFICANT CHANGE UP (ref 5–17)
BUN SERPL-MCNC: 14.1 MG/DL — SIGNIFICANT CHANGE UP (ref 8–20)
BUN SERPL-MCNC: 14.1 MG/DL — SIGNIFICANT CHANGE UP (ref 8–20)
CALCIUM SERPL-MCNC: 8.2 MG/DL — LOW (ref 8.4–10.5)
CALCIUM SERPL-MCNC: 8.2 MG/DL — LOW (ref 8.4–10.5)
CHLORIDE SERPL-SCNC: 110 MMOL/L — HIGH (ref 96–108)
CHLORIDE SERPL-SCNC: 110 MMOL/L — HIGH (ref 96–108)
CO2 SERPL-SCNC: 23 MMOL/L — SIGNIFICANT CHANGE UP (ref 22–29)
CO2 SERPL-SCNC: 23 MMOL/L — SIGNIFICANT CHANGE UP (ref 22–29)
CREAT SERPL-MCNC: 0.48 MG/DL — LOW (ref 0.5–1.3)
CREAT SERPL-MCNC: 0.48 MG/DL — LOW (ref 0.5–1.3)
EGFR: 125 ML/MIN/1.73M2 — SIGNIFICANT CHANGE UP
EGFR: 125 ML/MIN/1.73M2 — SIGNIFICANT CHANGE UP
GLUCOSE SERPL-MCNC: 92 MG/DL — SIGNIFICANT CHANGE UP (ref 70–99)
GLUCOSE SERPL-MCNC: 92 MG/DL — SIGNIFICANT CHANGE UP (ref 70–99)
HCT VFR BLD CALC: 26.8 % — LOW (ref 34.5–45)
HCT VFR BLD CALC: 26.8 % — LOW (ref 34.5–45)
HGB BLD-MCNC: 8.2 G/DL — LOW (ref 11.5–15.5)
HGB BLD-MCNC: 8.2 G/DL — LOW (ref 11.5–15.5)
LACTATE SERPL-SCNC: 1.6 MMOL/L — SIGNIFICANT CHANGE UP (ref 0.5–2)
LACTATE SERPL-SCNC: 1.6 MMOL/L — SIGNIFICANT CHANGE UP (ref 0.5–2)
MAGNESIUM SERPL-MCNC: 1.9 MG/DL — SIGNIFICANT CHANGE UP (ref 1.8–2.6)
MAGNESIUM SERPL-MCNC: 1.9 MG/DL — SIGNIFICANT CHANGE UP (ref 1.8–2.6)
MCHC RBC-ENTMCNC: 27.6 PG — SIGNIFICANT CHANGE UP (ref 27–34)
MCHC RBC-ENTMCNC: 27.6 PG — SIGNIFICANT CHANGE UP (ref 27–34)
MCHC RBC-ENTMCNC: 30.6 GM/DL — LOW (ref 32–36)
MCHC RBC-ENTMCNC: 30.6 GM/DL — LOW (ref 32–36)
MCV RBC AUTO: 90.2 FL — SIGNIFICANT CHANGE UP (ref 80–100)
MCV RBC AUTO: 90.2 FL — SIGNIFICANT CHANGE UP (ref 80–100)
OB PNL STL: POSITIVE
OB PNL STL: POSITIVE
PHOSPHATE SERPL-MCNC: 3.7 MG/DL — SIGNIFICANT CHANGE UP (ref 2.4–4.7)
PHOSPHATE SERPL-MCNC: 3.7 MG/DL — SIGNIFICANT CHANGE UP (ref 2.4–4.7)
PLATELET # BLD AUTO: 250 K/UL — SIGNIFICANT CHANGE UP (ref 150–400)
PLATELET # BLD AUTO: 250 K/UL — SIGNIFICANT CHANGE UP (ref 150–400)
POTASSIUM SERPL-MCNC: 3.5 MMOL/L — SIGNIFICANT CHANGE UP (ref 3.5–5.3)
POTASSIUM SERPL-MCNC: 3.5 MMOL/L — SIGNIFICANT CHANGE UP (ref 3.5–5.3)
POTASSIUM SERPL-SCNC: 3.5 MMOL/L — SIGNIFICANT CHANGE UP (ref 3.5–5.3)
POTASSIUM SERPL-SCNC: 3.5 MMOL/L — SIGNIFICANT CHANGE UP (ref 3.5–5.3)
RBC # BLD: 2.97 M/UL — LOW (ref 3.8–5.2)
RBC # BLD: 2.97 M/UL — LOW (ref 3.8–5.2)
RBC # FLD: 23.5 % — HIGH (ref 10.3–14.5)
RBC # FLD: 23.5 % — HIGH (ref 10.3–14.5)
SODIUM SERPL-SCNC: 142 MMOL/L — SIGNIFICANT CHANGE UP (ref 135–145)
SODIUM SERPL-SCNC: 142 MMOL/L — SIGNIFICANT CHANGE UP (ref 135–145)
WBC # BLD: 3.34 K/UL — LOW (ref 3.8–10.5)
WBC # BLD: 3.34 K/UL — LOW (ref 3.8–10.5)
WBC # FLD AUTO: 3.34 K/UL — LOW (ref 3.8–10.5)
WBC # FLD AUTO: 3.34 K/UL — LOW (ref 3.8–10.5)

## 2024-01-03 PROCEDURE — 99233 SBSQ HOSP IP/OBS HIGH 50: CPT

## 2024-01-03 PROCEDURE — 74177 CT ABD & PELVIS W/CONTRAST: CPT | Mod: 26

## 2024-01-03 PROCEDURE — 99222 1ST HOSP IP/OBS MODERATE 55: CPT

## 2024-01-03 RX ORDER — HYDROMORPHONE HYDROCHLORIDE 2 MG/ML
30 INJECTION INTRAMUSCULAR; INTRAVENOUS; SUBCUTANEOUS
Refills: 0 | Status: DISCONTINUED | OUTPATIENT
Start: 2024-01-03 | End: 2024-01-08

## 2024-01-03 RX ORDER — PIPERACILLIN AND TAZOBACTAM 4; .5 G/20ML; G/20ML
3.38 INJECTION, POWDER, LYOPHILIZED, FOR SOLUTION INTRAVENOUS ONCE
Refills: 0 | Status: COMPLETED | OUTPATIENT
Start: 2024-01-03 | End: 2024-01-03

## 2024-01-03 RX ORDER — NALOXONE HYDROCHLORIDE 4 MG/.1ML
0.1 SPRAY NASAL
Refills: 0 | Status: DISCONTINUED | OUTPATIENT
Start: 2024-01-03 | End: 2024-01-10

## 2024-01-03 RX ORDER — CHLORHEXIDINE GLUCONATE 213 G/1000ML
1 SOLUTION TOPICAL
Refills: 0 | Status: DISCONTINUED | OUTPATIENT
Start: 2024-01-03 | End: 2024-01-10

## 2024-01-03 RX ORDER — HYDROMORPHONE HYDROCHLORIDE 2 MG/ML
1 INJECTION INTRAMUSCULAR; INTRAVENOUS; SUBCUTANEOUS ONCE
Refills: 0 | Status: DISCONTINUED | OUTPATIENT
Start: 2024-01-03 | End: 2024-01-03

## 2024-01-03 RX ORDER — HYDROMORPHONE HYDROCHLORIDE 2 MG/ML
2 INJECTION INTRAMUSCULAR; INTRAVENOUS; SUBCUTANEOUS EVERY 6 HOURS
Refills: 0 | Status: DISCONTINUED | OUTPATIENT
Start: 2024-01-03 | End: 2024-01-03

## 2024-01-03 RX ORDER — METHADONE HYDROCHLORIDE 40 MG/1
0 TABLET ORAL
Qty: 0 | Refills: 0 | DISCHARGE

## 2024-01-03 RX ORDER — NALOXEGOL OXALATE 12.5 MG/1
25 TABLET, FILM COATED ORAL DAILY
Refills: 0 | Status: DISCONTINUED | OUTPATIENT
Start: 2024-01-03 | End: 2024-01-10

## 2024-01-03 RX ORDER — PIPERACILLIN AND TAZOBACTAM 4; .5 G/20ML; G/20ML
3.38 INJECTION, POWDER, LYOPHILIZED, FOR SOLUTION INTRAVENOUS EVERY 8 HOURS
Refills: 0 | Status: DISCONTINUED | OUTPATIENT
Start: 2024-01-04 | End: 2024-01-10

## 2024-01-03 RX ADMIN — Medication 150 MILLIGRAM(S): at 14:11

## 2024-01-03 RX ADMIN — HYDROMORPHONE HYDROCHLORIDE 1 MILLIGRAM(S): 2 INJECTION INTRAMUSCULAR; INTRAVENOUS; SUBCUTANEOUS at 04:17

## 2024-01-03 RX ADMIN — HYDROMORPHONE HYDROCHLORIDE 1 MILLIGRAM(S): 2 INJECTION INTRAMUSCULAR; INTRAVENOUS; SUBCUTANEOUS at 12:19

## 2024-01-03 RX ADMIN — Medication 150 MILLIGRAM(S): at 22:33

## 2024-01-03 RX ADMIN — Medication 150 MILLIGRAM(S): at 06:31

## 2024-01-03 RX ADMIN — Medication 1 TABLET(S): at 12:20

## 2024-01-03 RX ADMIN — HYDROMORPHONE HYDROCHLORIDE 1 MILLIGRAM(S): 2 INJECTION INTRAMUSCULAR; INTRAVENOUS; SUBCUTANEOUS at 08:15

## 2024-01-03 RX ADMIN — PANTOPRAZOLE SODIUM 40 MILLIGRAM(S): 20 TABLET, DELAYED RELEASE ORAL at 12:19

## 2024-01-03 RX ADMIN — HYDROMORPHONE HYDROCHLORIDE 30 MILLILITER(S): 2 INJECTION INTRAMUSCULAR; INTRAVENOUS; SUBCUTANEOUS at 15:47

## 2024-01-03 RX ADMIN — HYDROMORPHONE HYDROCHLORIDE 1 MILLIGRAM(S): 2 INJECTION INTRAMUSCULAR; INTRAVENOUS; SUBCUTANEOUS at 08:45

## 2024-01-03 RX ADMIN — CHLORHEXIDINE GLUCONATE 1 APPLICATION(S): 213 SOLUTION TOPICAL at 12:50

## 2024-01-03 RX ADMIN — METHADONE HYDROCHLORIDE 10 MILLIGRAM(S): 40 TABLET ORAL at 14:11

## 2024-01-03 RX ADMIN — METHADONE HYDROCHLORIDE 15 MILLIGRAM(S): 40 TABLET ORAL at 22:33

## 2024-01-03 RX ADMIN — PIPERACILLIN AND TAZOBACTAM 200 GRAM(S): 4; .5 INJECTION, POWDER, LYOPHILIZED, FOR SOLUTION INTRAVENOUS at 17:46

## 2024-01-03 RX ADMIN — MEMANTINE HYDROCHLORIDE 10 MILLIGRAM(S): 10 TABLET ORAL at 06:31

## 2024-01-03 RX ADMIN — HYDROMORPHONE HYDROCHLORIDE 1 MILLIGRAM(S): 2 INJECTION INTRAMUSCULAR; INTRAVENOUS; SUBCUTANEOUS at 12:50

## 2024-01-03 RX ADMIN — HYDROMORPHONE HYDROCHLORIDE 30 MILLILITER(S): 2 INJECTION INTRAMUSCULAR; INTRAVENOUS; SUBCUTANEOUS at 19:40

## 2024-01-03 RX ADMIN — HYDROMORPHONE HYDROCHLORIDE 1 MILLIGRAM(S): 2 INJECTION INTRAMUSCULAR; INTRAVENOUS; SUBCUTANEOUS at 15:24

## 2024-01-03 RX ADMIN — MEMANTINE HYDROCHLORIDE 10 MILLIGRAM(S): 10 TABLET ORAL at 17:45

## 2024-01-03 RX ADMIN — METHADONE HYDROCHLORIDE 10 MILLIGRAM(S): 40 TABLET ORAL at 06:31

## 2024-01-03 RX ADMIN — Medication 1 MILLIGRAM(S): at 12:19

## 2024-01-03 RX ADMIN — SODIUM CHLORIDE 3 MILLILITER(S): 9 INJECTION INTRAMUSCULAR; INTRAVENOUS; SUBCUTANEOUS at 14:20

## 2024-01-03 RX ADMIN — HYDROMORPHONE HYDROCHLORIDE 1 MILLIGRAM(S): 2 INJECTION INTRAMUSCULAR; INTRAVENOUS; SUBCUTANEOUS at 15:39

## 2024-01-03 RX ADMIN — SODIUM CHLORIDE 3 MILLILITER(S): 9 INJECTION INTRAMUSCULAR; INTRAVENOUS; SUBCUTANEOUS at 21:42

## 2024-01-03 RX ADMIN — NALOXEGOL OXALATE 25 MILLIGRAM(S): 12.5 TABLET, FILM COATED ORAL at 12:50

## 2024-01-03 RX ADMIN — HYDROMORPHONE HYDROCHLORIDE 1 MILLIGRAM(S): 2 INJECTION INTRAMUSCULAR; INTRAVENOUS; SUBCUTANEOUS at 03:17

## 2024-01-03 RX ADMIN — HYDROMORPHONE HYDROCHLORIDE 1 MILLIGRAM(S): 2 INJECTION INTRAMUSCULAR; INTRAVENOUS; SUBCUTANEOUS at 00:59

## 2024-01-03 NOTE — CONSULT NOTE ADULT - SUBJECTIVE AND OBJECTIVE BOX
Patient is a 38y old  Female who presents with a chief complaint of uncontrolled pain      HPI:  37 y/o female with hx of Stage-4 Breast cancer with diffuse osseous mets, prior malignant pericardial effusion, pleural effusion s/p drain, s/p XRT/Chemo last chemo with Enhertu with plan to switch to keytruda on 11/29 but has been on hold due to recurrent symptomatic anemia requiring multiple transfusions. Last admission from 11/17-11/20 reviewed and s/p Colonoscopy at [place] prior to that and while on last admission both with poor prep done due to reported BRBPR and found to have large internal hemorrhoids and areas of colitis on CT. Reports she had an capsular endoscopy since her last DC and it showed "enteritis." She admits to not using stool softeners and laxatives as advised and is on chronic opioids for cancer related pain. She admits to blood in the toiler and when she wipes intermittently. Prior EGD with gastritis. She was seen in ED multiple times since last DC for PRBC transfusion for anemia felt to be multi-factorial from chemo, AOCD, GIB. She was sent in this time for Hbg of 7.5 and feeling overall weak, cold with relative hypotension. Denies CP, SOB, cough, fever, N/V. She denies dark stool. Declined rectal in ED. Ordered 1 unit PRBCs and given without incident. Denies any other complaints at this time.  (03 Jan 2024 02:54)            Analgesic Dosing for past 24 hours reviewed as below:    HYDROmorphone  Injectable   2 milliGRAM(s) IV Push (01-02-24 @ 20:40)    HYDROmorphone  Injectable   2 milliGRAM(s) IV Push (01-02-24 @ 18:19)    HYDROmorphone  Injectable   1 milliGRAM(s) IV Push (01-03-24 @ 12:19)   1 milliGRAM(s) IV Push (01-03-24 @ 08:15)   1 milliGRAM(s) IV Push (01-03-24 @ 03:17)   1 milliGRAM(s) IV Push (01-02-24 @ 23:59)    memantine   10 milliGRAM(s) Oral (01-03-24 @ 06:31)    methadone    Tablet   10 milliGRAM(s) Oral (01-03-24 @ 14:11)   10 milliGRAM(s) Oral (01-03-24 @ 06:31)    pregabalin   150 milliGRAM(s) Oral (01-03-24 @ 14:11)   150 milliGRAM(s) Oral (01-03-24 @ 06:31)          T(C): 36.9 (01-03-24 @ 14:15), Max: 36.9 (01-02-24 @ 22:40)  HR: 96 (01-03-24 @ 14:15) (72 - 96)  BP: 114/78 (01-03-24 @ 14:15) (91/51 - 117/74)  RR: 18 (01-03-24 @ 14:15) (15 - 18)  SpO2: 99% (01-03-24 @ 14:15) (96% - 99%)        acetaminophen     Tablet .. 650 milliGRAM(s) Oral every 6 hours PRN  aluminum hydroxide/magnesium hydroxide/simethicone Suspension 30 milliLiter(s) Oral every 4 hours PRN  chlorhexidine 2% Cloths 1 Application(s) Topical <User Schedule>  folic acid 1 milliGRAM(s) Oral daily  glycerin Suppository - Adult 1 Suppository(s) Rectal at bedtime PRN  hydrocortisone hemorrhoidal Suppository 1 Suppository(s) Rectal at bedtime  HYDROmorphone  Injectable 2 milliGRAM(s) IV Push every 6 hours PRN  HYDROmorphone  Injectable 0.5 milliGRAM(s) IV Push every 3 hours PRN  HYDROmorphone PCA (1 mG/mL) 30 milliLiter(s) PCA Continuous PCA Continuous  LORazepam     Tablet 0.5 milliGRAM(s) Oral two times a day PRN  melatonin 3 milliGRAM(s) Oral at bedtime PRN  memantine 10 milliGRAM(s) Oral two times a day  methadone    Tablet 15 milliGRAM(s) Oral at bedtime  methadone    Tablet 10 milliGRAM(s) Oral <User Schedule>  multivitamin 1 Tablet(s) Oral daily  naloxegol 25 milliGRAM(s) Oral daily  naloxone Injectable 0.1 milliGRAM(s) IV Push every 3 minutes PRN  ondansetron Injectable 4 milliGRAM(s) IV Push every 8 hours PRN  pantoprazole  Injectable 40 milliGRAM(s) IV Push daily  polyethylene glycol 3350 17 Gram(s) Oral at bedtime  pregabalin 150 milliGRAM(s) Oral three times a day  sodium chloride 0.9% lock flush 3 milliLiter(s) IV Push every 8 hours                          8.2    3.34  )-----------( 250      ( 03 Jan 2024 07:28 )             26.8     01-03    142  |  110<H>  |  14.1  ----------------------------<  92  3.5   |  23.0  |  0.48<L>    Ca    8.2<L>      03 Jan 2024 07:28  Phos  3.7     01-03  Mg     1.9     01-03    TPro  5.6<L>  /  Alb  3.4  /  TBili  0.9  /  DBili  x   /  AST  35<H>  /  ALT  16  /  AlkPhos  192<H>  01-02    PT/INR - ( 02 Jan 2024 18:00 )   PT: 11.8 sec;   INR: 1.07 ratio         PTT - ( 02 Jan 2024 18:00 )  PTT:31.8 sec  Urinalysis Basic - ( 03 Jan 2024 07:28 )    Color: x / Appearance: x / SG: x / pH: x  Gluc: 92 mg/dL / Ketone: x  / Bili: x / Urobili: x   Blood: x / Protein: x / Nitrite: x   Leuk Esterase: x / RBC: x / WBC x   Sq Epi: x / Non Sq Epi: x / Bacteria: x        Pain Service   [phone] Patient is a 38y old  Female who presents with a chief complaint of uncontrolled pain      HPI:  37 y/o female with hx of Stage-4 Breast cancer with diffuse osseous mets, prior malignant pericardial effusion, pleural effusion s/p drain, s/p XRT/Chemo last chemo with Enhertu with plan to switch to keytruda on 11/29 but has been on hold due to recurrent symptomatic anemia requiring multiple transfusions. Last admission from 11/17-11/20 reviewed and s/p Colonoscopy at [place] prior to that and while on last admission both with poor prep done due to reported BRBPR and found to have large internal hemorrhoids and areas of colitis on CT. Reports she had an capsular endoscopy since her last DC and it showed "enteritis." She admits to not using stool softeners and laxatives as advised and is on chronic opioids for cancer related pain. She admits to blood in the toiler and when she wipes intermittently. Prior EGD with gastritis. She was seen in ED multiple times since last DC for PRBC transfusion for anemia felt to be multi-factorial from chemo, AOCD, GIB. She was sent in this time for Hbg of 7.5 and feeling overall weak, cold with relative hypotension. Denies CP, SOB, cough, fever, N/V. She denies dark stool. Declined rectal in ED. Ordered 1 unit PRBCs and given without incident. Denies any other complaints at this time.  (03 Jan 2024 02:54)            Analgesic Dosing for past 24 hours reviewed as below:    HYDROmorphone  Injectable   2 milliGRAM(s) IV Push (01-02-24 @ 20:40)    HYDROmorphone  Injectable   2 milliGRAM(s) IV Push (01-02-24 @ 18:19)    HYDROmorphone  Injectable   1 milliGRAM(s) IV Push (01-03-24 @ 12:19)   1 milliGRAM(s) IV Push (01-03-24 @ 08:15)   1 milliGRAM(s) IV Push (01-03-24 @ 03:17)   1 milliGRAM(s) IV Push (01-02-24 @ 23:59)    memantine   10 milliGRAM(s) Oral (01-03-24 @ 06:31)    methadone    Tablet   10 milliGRAM(s) Oral (01-03-24 @ 14:11)   10 milliGRAM(s) Oral (01-03-24 @ 06:31)    pregabalin   150 milliGRAM(s) Oral (01-03-24 @ 14:11)   150 milliGRAM(s) Oral (01-03-24 @ 06:31)          T(C): 36.9 (01-03-24 @ 14:15), Max: 36.9 (01-02-24 @ 22:40)  HR: 96 (01-03-24 @ 14:15) (72 - 96)  BP: 114/78 (01-03-24 @ 14:15) (91/51 - 117/74)  RR: 18 (01-03-24 @ 14:15) (15 - 18)  SpO2: 99% (01-03-24 @ 14:15) (96% - 99%)        acetaminophen     Tablet .. 650 milliGRAM(s) Oral every 6 hours PRN  aluminum hydroxide/magnesium hydroxide/simethicone Suspension 30 milliLiter(s) Oral every 4 hours PRN  chlorhexidine 2% Cloths 1 Application(s) Topical <User Schedule>  folic acid 1 milliGRAM(s) Oral daily  glycerin Suppository - Adult 1 Suppository(s) Rectal at bedtime PRN  hydrocortisone hemorrhoidal Suppository 1 Suppository(s) Rectal at bedtime  HYDROmorphone  Injectable 2 milliGRAM(s) IV Push every 6 hours PRN  HYDROmorphone  Injectable 0.5 milliGRAM(s) IV Push every 3 hours PRN  HYDROmorphone PCA (1 mG/mL) 30 milliLiter(s) PCA Continuous PCA Continuous  LORazepam     Tablet 0.5 milliGRAM(s) Oral two times a day PRN  melatonin 3 milliGRAM(s) Oral at bedtime PRN  memantine 10 milliGRAM(s) Oral two times a day  methadone    Tablet 15 milliGRAM(s) Oral at bedtime  methadone    Tablet 10 milliGRAM(s) Oral <User Schedule>  multivitamin 1 Tablet(s) Oral daily  naloxegol 25 milliGRAM(s) Oral daily  naloxone Injectable 0.1 milliGRAM(s) IV Push every 3 minutes PRN  ondansetron Injectable 4 milliGRAM(s) IV Push every 8 hours PRN  pantoprazole  Injectable 40 milliGRAM(s) IV Push daily  polyethylene glycol 3350 17 Gram(s) Oral at bedtime  pregabalin 150 milliGRAM(s) Oral three times a day  sodium chloride 0.9% lock flush 3 milliLiter(s) IV Push every 8 hours                          8.2    3.34  )-----------( 250      ( 03 Jan 2024 07:28 )             26.8     01-03    142  |  110<H>  |  14.1  ----------------------------<  92  3.5   |  23.0  |  0.48<L>    Ca    8.2<L>      03 Jan 2024 07:28  Phos  3.7     01-03  Mg     1.9     01-03    TPro  5.6<L>  /  Alb  3.4  /  TBili  0.9  /  DBili  x   /  AST  35<H>  /  ALT  16  /  AlkPhos  192<H>  01-02    PT/INR - ( 02 Jan 2024 18:00 )   PT: 11.8 sec;   INR: 1.07 ratio         PTT - ( 02 Jan 2024 18:00 )  PTT:31.8 sec  Urinalysis Basic - ( 03 Jan 2024 07:28 )    Color: x / Appearance: x / SG: x / pH: x  Gluc: 92 mg/dL / Ketone: x  / Bili: x / Urobili: x   Blood: x / Protein: x / Nitrite: x   Leuk Esterase: x / RBC: x / WBC x   Sq Epi: x / Non Sq Epi: x / Bacteria: x        Pain Service   [phone]

## 2024-01-03 NOTE — PROGRESS NOTE ADULT - ASSESSMENT
39 y/o female with recurrent symptomatic anemia, Leukopenia, Stage-4 breast cancer    Assessment/Plan:    1. Recurrent symptomatic anemia:  -Multi-factorial from likely recurrent IH bleeding, gastritis, prior chemo, active malignancy   -Prior colonoscopy reviewed in 11/23  -Reports "enteritis" on Capsular endoscopy   -IV PPI, check  post transfusion CBC goal hbg >8  -FA/MVI  -Iron studies done by ED not interpretable based on multiple recent PRBC transfusions   -Not on NSAIDs or AC  -Areas of colitis noted on prior CTs s/p prior Abx course with cipro/flagly  -Miralax HS, glycerine suppositories prn  - MOvantik started for constipation-  chronic high dose opioids needed for cancer related pain   -Seen by GI recommend CTE  -Onc f/u with Dr. Chapa  -ilana Swift, ambulate    2. Leukopenia:  -Likely related to malignancy/prior chemo  -Trend    3. Stage-4 Breast cancer:  -F/U with Onc as scheduled  -Cont. with pain regimen for cancer related pain from bone mets  -F/U with pain management as scheduled   -PRN ativan for anxiety   37 y/o female with recurrent symptomatic anemia, Leukopenia, Stage-4 breast cancer    Assessment/Plan:    1. Recurrent symptomatic anemia:  -Multi-factorial from likely recurrent IH bleeding, gastritis, prior chemo, active malignancy   -Prior colonoscopy reviewed in 11/23  -Reports "enteritis" on Capsular endoscopy   -IV PPI, check  post transfusion CBC goal hbg >8  -FA/MVI  -Iron studies done by ED not interpretable based on multiple recent PRBC transfusions   -Not on NSAIDs or AC  -Areas of colitis noted on prior CTs s/p prior Abx course with cipro/flagly  -Miralax HS, glycerine suppositories prn  - MOvantik started for constipation-  chronic high dose opioids needed for cancer related pain   -Seen by GI recommend CTE  -Onc f/u with Dr. Chapa  -ilana Swift, ambulate    2. Leukopenia:  -Likely related to malignancy/prior chemo  -Trend    3. Stage-4 Breast cancer:  -F/U with Onc as scheduled  -Cont. with pain regimen for cancer related pain from bone mets  -F/U with pain management as scheduled   -PRN ativan for anxiety

## 2024-01-03 NOTE — CONSULT NOTE ADULT - TIME BILLING
Reviewing all of the above GI w/u and reports in order to formulate the above assessment and management plan.

## 2024-01-03 NOTE — PROGRESS NOTE ADULT - SUBJECTIVE AND OBJECTIVE BOX
CC" Follow up     INTERVAL HPI/OVERNIGHT EVENTS: Patient seen and examined, complaints of generalized pain       Vital Signs Last 24 Hrs  T(C): 36.7 (03 Jan 2024 05:09), Max: 36.9 (02 Jan 2024 22:40)  T(F): 98 (03 Jan 2024 05:09), Max: 98.4 (02 Jan 2024 22:40)  HR: 75 (03 Jan 2024 05:09) (72 - 89)  BP: 103/66 (03 Jan 2024 06:30) (91/51 - 117/74)  BP(mean): 87 (03 Jan 2024 02:54) (87 - 87)  RR: 18 (03 Jan 2024 05:09) (15 - 18)  SpO2: 96% (03 Jan 2024 05:09) (96% - 99%)    Parameters below as of 03 Jan 2024 05:09  Patient On (Oxygen Delivery Method): room air        PHYSICAL EXAM:    GENERAL: NAD, AOX3  HEAD:  Atraumatic, Normocephalic  ENMT: Moist mucous membranes  NECK: Supple  CHEST/LUNG: Clear to auscultation bilaterally; No rales, rhonchi, wheezing, or rubs  HEART: Regular rate and rhythm; No murmurs, rubs, or gallops  ABDOMEN: Soft, Nontender, Nondistended; Bowel sounds present  EXTREMITIES:  2+ Peripheral Pulses, No clubbing, cyanosis, or edema        MEDICATIONS  (STANDING):  chlorhexidine 2% Cloths 1 Application(s) Topical <User Schedule>  folic acid 1 milliGRAM(s) Oral daily  hydrocortisone hemorrhoidal Suppository 1 Suppository(s) Rectal at bedtime  memantine 10 milliGRAM(s) Oral two times a day  methadone    Tablet 10 milliGRAM(s) Oral <User Schedule>  methadone    Tablet 15 milliGRAM(s) Oral at bedtime  multivitamin 1 Tablet(s) Oral daily  naloxegol 25 milliGRAM(s) Oral daily  pantoprazole  Injectable 40 milliGRAM(s) IV Push daily  polyethylene glycol 3350 17 Gram(s) Oral at bedtime  pregabalin 150 milliGRAM(s) Oral three times a day  sodium chloride 0.9% lock flush 3 milliLiter(s) IV Push every 8 hours    MEDICATIONS  (PRN):  acetaminophen     Tablet .. 650 milliGRAM(s) Oral every 6 hours PRN Temp greater or equal to 38C (100.4F), Mild Pain (1 - 3)  aluminum hydroxide/magnesium hydroxide/simethicone Suspension 30 milliLiter(s) Oral every 4 hours PRN Dyspepsia  glycerin Suppository - Adult 1 Suppository(s) Rectal at bedtime PRN Constipation  HYDROmorphone  Injectable 0.5 milliGRAM(s) IV Push every 3 hours PRN Moderate Pain (4 - 6)  HYDROmorphone  Injectable 1 milliGRAM(s) IV Push every 4 hours PRN Severe Pain (7 - 10)  LORazepam     Tablet 0.5 milliGRAM(s) Oral two times a day PRN Anxiety  melatonin 3 milliGRAM(s) Oral at bedtime PRN Insomnia  ondansetron Injectable 4 milliGRAM(s) IV Push every 8 hours PRN Nausea and/or Vomiting      Allergies    Perjeta (Other (Severe))  pertuzumab (Other (Severe))    Intolerances          LABS:                          8.2    3.34  )-----------( 250      ( 03 Jan 2024 07:28 )             26.8     01-03    142  |  110<H>  |  14.1  ----------------------------<  92  3.5   |  23.0  |  0.48<L>    Ca    8.2<L>      03 Jan 2024 07:28  Phos  3.7     01-03  Mg     1.9     01-03    TPro  5.6<L>  /  Alb  3.4  /  TBili  0.9  /  DBili  x   /  AST  35<H>  /  ALT  16  /  AlkPhos  192<H>  01-02    PT/INR - ( 02 Jan 2024 18:00 )   PT: 11.8 sec;   INR: 1.07 ratio         PTT - ( 02 Jan 2024 18:00 )  PTT:31.8 sec  Urinalysis Basic - ( 03 Jan 2024 07:28 )    Color: x / Appearance: x / SG: x / pH: x  Gluc: 92 mg/dL / Ketone: x  / Bili: x / Urobili: x   Blood: x / Protein: x / Nitrite: x   Leuk Esterase: x / RBC: x / WBC x   Sq Epi: x / Non Sq Epi: x / Bacteria: x        RADIOLOGY & ADDITIONAL TESTS:

## 2024-01-03 NOTE — H&P ADULT - HISTORY OF PRESENT ILLNESS
37 y/o female with hx of Stage-4 Breast cancer with diffuse osseous mets, prior malignant pericardial effusion, pleural effusion s/p drain, s/p XRT/Chemo last chemo with Enhertu with plan to switch to keytruda on 11/29 but has been on hold due to recurrent symptomatic anemia requiring multiple transfusions. Last admission from 11/17-11/20 reviewed and s/p Colonoscopy at [place] prior to that and while on last admission both with poor prep done due to reported BRBPR and found to have large internal hemorrhoids and areas of colitis on CT. Reports she had an capsular endoscopy since her last DC and it showed "enteritis." She admits to not using stool softeners and laxatives as advised and is on chronic opioids for cancer related pain. She admits to blood in the toiler and when she wipes intermittently. Prior EGD with gastritis. She was seen in ED multiple times since last DC for PRBC transfusion for anemia felt to be multi-factorial from chemo, AOCD, GIB. She was sent in this time for Hbg of 7.5 and feeling overall weak, cold with relative hypotension. Denies CP, SOB, cough, fever, N/V. She denies dark stool. Declined rectal in ED. Ordered 1 unit PRBCs and given without incident. Denies any other complaints at this time.  39 y/o female with hx of Stage-4 Breast cancer with diffuse osseous mets, prior malignant pericardial effusion, pleural effusion s/p drain, s/p XRT/Chemo last chemo with Enhertu with plan to switch to keytruda on 11/29 but has been on hold due to recurrent symptomatic anemia requiring multiple transfusions. Last admission from 11/17-11/20 reviewed and s/p Colonoscopy at [place] prior to that and while on last admission both with poor prep done due to reported BRBPR and found to have large internal hemorrhoids and areas of colitis on CT. Reports she had an capsular endoscopy since her last DC and it showed "enteritis." She admits to not using stool softeners and laxatives as advised and is on chronic opioids for cancer related pain. She admits to blood in the toiler and when she wipes intermittently. Prior EGD with gastritis. She was seen in ED multiple times since last DC for PRBC transfusion for anemia felt to be multi-factorial from chemo, AOCD, GIB. She was sent in this time for Hbg of 7.5 and feeling overall weak, cold with relative hypotension. Denies CP, SOB, cough, fever, N/V. She denies dark stool. Declined rectal in ED. Ordered 1 unit PRBCs and given without incident. Denies any other complaints at this time.

## 2024-01-03 NOTE — CONSULT NOTE ADULT - NS ATTEND AMEND GEN_ALL_CORE FT
I evaluated and examined this pt. with my ACP and reviewed all of her recent GI w/u both at [place] and here and in our office and agree with the above assessment and management plan. Pt with extensive GI w/u as outlined above with recent VCE done last month that showed severe enteritis from the mid jejunum to the ileum. CT enterography ordered for further evaluation. Pt w/o overt GI bleeding. OK for pt. to eat. Pantoprazole for GI mucosal cytoprotection. Repeat labs ordered for the AM. I evaluated and examined this pt. with my ACP and reviewed all of her recent GI w/u both at [place] and here and in our office and agree with the above assessment and management plan. Pt with extensive GI w/u as outlined above with recent VCE done last month that showed severe enteritis from the mid jejunum to the ileum. CT enterography ordered for further evaluation. Pt w/o overt GI bleeding. OK for pt. to eat. Pantoprazole for GI mucosal cytoprotection. Repeat labs ordered for the AM.

## 2024-01-03 NOTE — CONSULT NOTE ADULT - ASSESSMENT
39 yo F known patient of Dr Chapa at [facility], metastatic breast ca on Enhertu, GIB who was admitted to the hospital due to severe anemia and GIB.     1) Breast ca  as above  on active chemotherapy with Enhertu  PET CT showed stable disease.  f/u with Dr Chapa upon DC    2) Anemia  hgb 8.2 today, s/p prbc transfusion.  2u PRBC  GI evaluation - recently had colonoscopy but poor bowel prep , may need repeat  IV abx  cultures  transfuse if hgb<7.0 or if actively bleeding.    3) Leukopenia  recent chemo  keep ANC > 1000  wbc 3.34k.  No fever.    4) DVT ppx      will follow 37 yo F known patient of Dr Chapa at [facility], metastatic breast ca on Enhertu, GIB who was admitted to the hospital due to severe anemia and GIB.     1) Breast ca  as above  on active chemotherapy with Enhertu  PET CT showed stable disease.  f/u with Dr Chapa upon DC    2) Anemia  hgb 8.2 today, s/p prbc transfusion.  2u PRBC  GI evaluation - recently had colonoscopy but poor bowel prep , may need repeat  IV abx  cultures  transfuse if hgb<7.0 or if actively bleeding.    3) Leukopenia  recent chemo  keep ANC > 1000  wbc 3.34k.  No fever.    4) DVT ppx      will follow 39 yo F known patient of Dr Chapa at [facility], metastatic breast ca on Enhertu, GIB who was admitted to the hospital due to severe anemia and GIB.     1) Metastatic Breast ca  as above  on Enhertu. Last dose on 12/20/2023.  PET CT showed stable disease.  f/u with Dr Chapa upon DC    2) Anemia  hgb 8.2 today, s/p prbc transfusion.  GI on board  IV abx  cultures  transfuse if hgb<7.0 or if actively bleeding.    3) Leukopenia  recent chemo  keep ANC > 1000  wbc 3.34k.  No fever.    4) DVT ppx      will follow 39 yo F known patient of Dr Chapa at [facility], metastatic breast ca on Enhertu, GIB who was admitted to the hospital due to severe anemia and GIB.     1) Metastatic Breast ca  as above  on Enhertu. Last dose on 12/20/2023.  PET CT showed stable disease.  f/u with Dr Chapa upon DC    2) Anemia  hgb 8.2 today, s/p prbc transfusion.  GI on board  IV abx  cultures  transfuse if hgb<7.0 or if actively bleeding.    3) Leukopenia  recent chemo  keep ANC > 1000  wbc 3.34k.  No fever.    4) DVT ppx      will follow

## 2024-01-03 NOTE — CONSULT NOTE ADULT - SUBJECTIVE AND OBJECTIVE BOX
HISTORY OF PRESENT ILLNESS: 37 y/o female with hx of Stage lV Breast cancer with diffuse osseous mets, prior malignant pericardial effusion, pleural effusion s/p drain, s/p XRT/Chemo last chemo with Enhertu with plan to switch to Keytruda on 11/29 but has been on hold due to recurrent symptomatic anemia requiring multiple transfusions. Patient was recently admitted to [facility] (11/17-11/20) with anemia, underwent colonoscopy on 11/20 showed internal hemorrhoids. Outpatient VCE on 12/11/23 revealed enteritis. EGD/ colonoscopy (10/2023) at [place] showed antral erythema, colonoscopy was with poor prep. Patient reports chronic constipation and she takes Dulcolax suppository every 4 days and PO daily with that she reports BM every 3-4 days. Reports intermittent episode brown stool with variable amount of blood "from minute to moderate amount of blood". Denies abdominal pain, epigastric pain, acid reflux, nausea, vomiting, chest pain, palpitation. Her last session of chemo was November 2023. Denies use of ETOH, smoking, illicit drugs. She is not on any anticoagulation. She is on Methodone three times a day (10-10-15 mg)In ED her hemoglobin was 7.5 gm for which she received 1 units of PRBC.       Review of Systems:  . Constitutional: No fever, chills  . HEENT: Negative  · Respiratory and Thorax: No shortness of breath, no cough  · Cardiovascular: No chest pain, palpitation, no dizziness  · Gastrointestinal: see above  · Genitourinary: No hematuria  · Musculoskeletal: Negative  · Neurological: negative  · Psychiatric: no agitation, no anxiety      PAST MEDICAL/SURGICAL HISTORY:  H/O compression fracture of spine    Anxiety    Metastatic breast cancer    H/O pleural effusion    Pericardial effusion    S/P tonsillectomy    H/O chest tube placement  12/23/21    S/P pericardiocentesis  12/28/21      SOCIAL HISTORY:  - TOBACCO: Denies  - ALCOHOL: Denies  - ILLICIT DRUG USE: Denies    FAMILY HISTORY:  No known history of gastrointestinal or liver disease;  FH: CVA (cerebrovascular accident)        HOME MEDICATIONS:  Ativan 0.5 mg oral tablet: 1 tab(s) orally 2 times a day as needed for  anxiety (03 Jan 2024 03:12)  Dilaudid 4 mg oral tablet: 1 tab(s) orally every 8 hours as needed for  severe pain (03 Jan 2024 03:12)  Lyrica 150 mg oral capsule: 1 cap(s) orally 3 times a day hold if lethargic and do not take it with narcotics at the same time (03 Jan 2024 03:12)  methadone 10 mg oral tablet: 1 tab(s) orally 2 times a day (03 Jan 2024 03:12)  methadone 5 mg oral tablet: 3 tab(s) orally once a day (at bedtime) (03 Jan 2024 03:12)  Namenda 10 mg oral tablet: 1 tab(s) orally 2 times a day (03 Jan 2024 03:12)    INPATIENT MEDICATIONS:  MEDICATIONS  (STANDING):  folic acid 1 milliGRAM(s) Oral daily  hydrocortisone hemorrhoidal Suppository 1 Suppository(s) Rectal at bedtime  memantine 10 milliGRAM(s) Oral two times a day  methadone    Tablet 15 milliGRAM(s) Oral at bedtime  methadone    Tablet 10 milliGRAM(s) Oral <User Schedule>  multivitamin 1 Tablet(s) Oral daily  pantoprazole  Injectable 40 milliGRAM(s) IV Push daily  polyethylene glycol 3350 17 Gram(s) Oral at bedtime  pregabalin 150 milliGRAM(s) Oral three times a day  sodium chloride 0.9% lock flush 3 milliLiter(s) IV Push every 8 hours    MEDICATIONS  (PRN):  acetaminophen     Tablet .. 650 milliGRAM(s) Oral every 6 hours PRN Temp greater or equal to 38C (100.4F), Mild Pain (1 - 3)  aluminum hydroxide/magnesium hydroxide/simethicone Suspension 30 milliLiter(s) Oral every 4 hours PRN Dyspepsia  glycerin Suppository - Adult 1 Suppository(s) Rectal at bedtime PRN Constipation  HYDROmorphone  Injectable 0.5 milliGRAM(s) IV Push every 3 hours PRN Moderate Pain (4 - 6)  HYDROmorphone  Injectable 1 milliGRAM(s) IV Push every 4 hours PRN Severe Pain (7 - 10)  LORazepam     Tablet 0.5 milliGRAM(s) Oral two times a day PRN Anxiety  melatonin 3 milliGRAM(s) Oral at bedtime PRN Insomnia  ondansetron Injectable 4 milliGRAM(s) IV Push every 8 hours PRN Nausea and/or Vomiting    ALLERGIES:  Perjeta (Other (Severe))  pertuzumab (Other (Severe))    T(C): 36.7 (01-03-24 @ 05:09), Max: 36.9 (01-02-24 @ 22:40)  HR: 75 (01-03-24 @ 05:09) (72 - 89)  BP: 103/66 (01-03-24 @ 06:30) (91/51 - 117/74)  RR: 18 (01-03-24 @ 05:09) (15 - 18)  SpO2: 96% (01-03-24 @ 05:09) (96% - 99%)      PHYSICAL EXAM:    Constitutional: No acute distress  Neuro: Awake alert  HEENT: anicteric sclerae  CV: regular rate, regular rhythm  Pulm/chest: lung sounds diminished bilaterally, no accessory muscle use noted  Abd: soft, nontender, nondistended +bowel sounds. No rigidity, rebound tenderness, or guarding  Rectal exam: Internal hemorrhoids. Brown color stool   Ext: trace BLE edema  Skin: warm, no jaundice   Psych: calm, cooperative        LABS:             7.5    3.35  )-----------( 268      ( 01-02 @ 18:00 )             23.3       PT/INR - ( 02 Jan 2024 18:00 )   PT: 11.8 sec;   INR: 1.07 ratio         PTT - ( 02 Jan 2024 18:00 )  PTT:31.8 sec  01-02    140  |  105  |  13.1  ----------------------------<  83  4.3   |  23.0  |  0.47<L>    Ca    9.0      02 Jan 2024 18:00    TPro  5.6<L>  /  Alb  3.4  /  TBili  0.9  /  DBili  x   /  AST  35<H>  /  ALT  16  /  AlkPhos  192<H>  01-02    LIVER FUNCTIONS - ( 02 Jan 2024 18:00 )  Alb: 3.4 g/dL / Pro: 5.6 g/dL / ALK PHOS: 192 U/L / ALT: 16 U/L / AST: 35 U/L / GGT: x               Iron - Total Binding Capacity.: 315 ug/dL (01-02-24 @ 18:00)  % Saturation, Iron: 12 % *L* (01-02-24 @ 18:00)  Iron Total: 38 ug/dL (01-02-24 @ 18:00)  Ferritin: 568 ng/mL *H* (01-02-24 @ 18:00)      Urinalysis Basic - ( 02 Jan 2024 18:00 )    Color: x / Appearance: x / SG: x / pH: x  Gluc: 83 mg/dL / Ketone: x  / Bili: x / Urobili: x   Blood: x / Protein: x / Nitrite: x   Leuk Esterase: x / RBC: x / WBC x   Sq Epi: x / Non Sq Epi: x / Bacteria: x    < from: CT Abdomen and Pelvis w/ IV Cont (12.07.23 @ 00:11) >  FINDINGS:  LOWER CHEST: Small left pleural effusion is increased in size compared to   previous exam. Intralobular septal thickening is also increased. Stable   right lower lobe pulmonary nodule. Question presence of additional   smaller bilateral nodules. Focal area of bronchial wall thickening,   partially visualized of the left lower lobe.    LIVER: Focal shunt of left hepatic lobe is less conspicuous on the   current study. The hepatic size and contour normal.  BILE DUCTS: Normal caliber.  GALLBLADDER: There is gallbladder wall thickening versus pericholecystic   fluid. There is cholelithiasis.  SPLEEN: Splenomegaly.  PANCREAS: Within normal limits. Previously described cystic lesion of the   pancreatic neck is not clearly delineated on the current study.  ADRENALS: Within normal limits.  KIDNEYS/URETERS: Within normal limits.    BLADDER: Diffusely thick-walled.  REPRODUCTIVE ORGANS: The uterus and adnexa are unremarkable.    BOWEL: No bowel obstruction. Appendix normal. There is diffuse wall   thickening of the cecum and proximal ascending colon. Additional wall   thickening of the distal descending colon is appreciated. Question   gastric wall thickening versus underdistention.  PERITONEUM: Small pelvic fluid.  VESSELS: Within normal limits.  RETROPERITONEUM/LYMPH NODES: No lymphadenopathy.  ABDOMINAL WALL: Postsurgical changes.  BONES: Diffuse osseous metastasis are again appreciated. T12 and L1   vertebroplasty changes are appreciated.    IMPRESSION:  Increasing left pleural effusion.    Splenomegaly.    Cholelithiasis and gallbladder wall thickening versus pericholecystic   fluid.    Wall thickening of the cecum and proximal ascending colon as well as the   distal descending colon. Findings are suspicious for multifocal colitis.   Infectious or inflammatory etiology should be considered. Ischemia would   be considered less likely.    Stable bilateral pulmonary nodules.    Diffuse osseous metastasis.    Wall thickening of the urinary bladder which may represent cystitis.   Correlate with urinalysis.    Additional findings as above.    < end of copied text >     HISTORY OF PRESENT ILLNESS: 39 y/o female with hx of Stage lV Breast cancer with diffuse osseous mets, prior malignant pericardial effusion, pleural effusion s/p drain, s/p XRT/Chemo last chemo with Enhertu with plan to switch to Keytruda on 11/29 but has been on hold due to recurrent symptomatic anemia requiring multiple transfusions. Patient was recently admitted to [facility] (11/17-11/20) with anemia, underwent colonoscopy on 11/20 showed internal hemorrhoids. Outpatient VCE on 12/11/23 revealed enteritis. EGD/ colonoscopy (10/2023) at [place] showed antral erythema, colonoscopy was with poor prep. Patient reports chronic constipation and she takes Dulcolax suppository every 4 days and PO daily with that she reports BM every 3-4 days. Reports intermittent episode brown stool with variable amount of blood "from minute to moderate amount of blood". Denies abdominal pain, epigastric pain, acid reflux, nausea, vomiting, chest pain, palpitation. Her last session of chemo was November 2023. Denies use of ETOH, smoking, illicit drugs. She is not on any anticoagulation. She is on Methodone three times a day (10-10-15 mg)In ED her hemoglobin was 7.5 gm for which she received 1 units of PRBC.       Review of Systems:  . Constitutional: No fever, chills  . HEENT: Negative  · Respiratory and Thorax: No shortness of breath, no cough  · Cardiovascular: No chest pain, palpitation, no dizziness  · Gastrointestinal: see above  · Genitourinary: No hematuria  · Musculoskeletal: Negative  · Neurological: negative  · Psychiatric: no agitation, no anxiety      PAST MEDICAL/SURGICAL HISTORY:  H/O compression fracture of spine    Anxiety    Metastatic breast cancer    H/O pleural effusion    Pericardial effusion    S/P tonsillectomy    H/O chest tube placement  12/23/21    S/P pericardiocentesis  12/28/21      SOCIAL HISTORY:  - TOBACCO: Denies  - ALCOHOL: Denies  - ILLICIT DRUG USE: Denies    FAMILY HISTORY:  No known history of gastrointestinal or liver disease;  FH: CVA (cerebrovascular accident)        HOME MEDICATIONS:  Ativan 0.5 mg oral tablet: 1 tab(s) orally 2 times a day as needed for  anxiety (03 Jan 2024 03:12)  Dilaudid 4 mg oral tablet: 1 tab(s) orally every 8 hours as needed for  severe pain (03 Jan 2024 03:12)  Lyrica 150 mg oral capsule: 1 cap(s) orally 3 times a day hold if lethargic and do not take it with narcotics at the same time (03 Jan 2024 03:12)  methadone 10 mg oral tablet: 1 tab(s) orally 2 times a day (03 Jan 2024 03:12)  methadone 5 mg oral tablet: 3 tab(s) orally once a day (at bedtime) (03 Jan 2024 03:12)  Namenda 10 mg oral tablet: 1 tab(s) orally 2 times a day (03 Jan 2024 03:12)    INPATIENT MEDICATIONS:  MEDICATIONS  (STANDING):  folic acid 1 milliGRAM(s) Oral daily  hydrocortisone hemorrhoidal Suppository 1 Suppository(s) Rectal at bedtime  memantine 10 milliGRAM(s) Oral two times a day  methadone    Tablet 15 milliGRAM(s) Oral at bedtime  methadone    Tablet 10 milliGRAM(s) Oral <User Schedule>  multivitamin 1 Tablet(s) Oral daily  pantoprazole  Injectable 40 milliGRAM(s) IV Push daily  polyethylene glycol 3350 17 Gram(s) Oral at bedtime  pregabalin 150 milliGRAM(s) Oral three times a day  sodium chloride 0.9% lock flush 3 milliLiter(s) IV Push every 8 hours    MEDICATIONS  (PRN):  acetaminophen     Tablet .. 650 milliGRAM(s) Oral every 6 hours PRN Temp greater or equal to 38C (100.4F), Mild Pain (1 - 3)  aluminum hydroxide/magnesium hydroxide/simethicone Suspension 30 milliLiter(s) Oral every 4 hours PRN Dyspepsia  glycerin Suppository - Adult 1 Suppository(s) Rectal at bedtime PRN Constipation  HYDROmorphone  Injectable 0.5 milliGRAM(s) IV Push every 3 hours PRN Moderate Pain (4 - 6)  HYDROmorphone  Injectable 1 milliGRAM(s) IV Push every 4 hours PRN Severe Pain (7 - 10)  LORazepam     Tablet 0.5 milliGRAM(s) Oral two times a day PRN Anxiety  melatonin 3 milliGRAM(s) Oral at bedtime PRN Insomnia  ondansetron Injectable 4 milliGRAM(s) IV Push every 8 hours PRN Nausea and/or Vomiting    ALLERGIES:  Perjeta (Other (Severe))  pertuzumab (Other (Severe))    T(C): 36.7 (01-03-24 @ 05:09), Max: 36.9 (01-02-24 @ 22:40)  HR: 75 (01-03-24 @ 05:09) (72 - 89)  BP: 103/66 (01-03-24 @ 06:30) (91/51 - 117/74)  RR: 18 (01-03-24 @ 05:09) (15 - 18)  SpO2: 96% (01-03-24 @ 05:09) (96% - 99%)      PHYSICAL EXAM:    Constitutional: No acute distress  Neuro: Awake alert  HEENT: anicteric sclerae  CV: regular rate, regular rhythm  Pulm/chest: lung sounds diminished bilaterally, no accessory muscle use noted  Abd: soft, nontender, nondistended +bowel sounds. No rigidity, rebound tenderness, or guarding  Rectal exam: Internal hemorrhoids. Brown color stool   Ext: trace BLE edema  Skin: warm, no jaundice   Psych: calm, cooperative        LABS:             7.5    3.35  )-----------( 268      ( 01-02 @ 18:00 )             23.3       PT/INR - ( 02 Jan 2024 18:00 )   PT: 11.8 sec;   INR: 1.07 ratio         PTT - ( 02 Jan 2024 18:00 )  PTT:31.8 sec  01-02    140  |  105  |  13.1  ----------------------------<  83  4.3   |  23.0  |  0.47<L>    Ca    9.0      02 Jan 2024 18:00    TPro  5.6<L>  /  Alb  3.4  /  TBili  0.9  /  DBili  x   /  AST  35<H>  /  ALT  16  /  AlkPhos  192<H>  01-02    LIVER FUNCTIONS - ( 02 Jan 2024 18:00 )  Alb: 3.4 g/dL / Pro: 5.6 g/dL / ALK PHOS: 192 U/L / ALT: 16 U/L / AST: 35 U/L / GGT: x               Iron - Total Binding Capacity.: 315 ug/dL (01-02-24 @ 18:00)  % Saturation, Iron: 12 % *L* (01-02-24 @ 18:00)  Iron Total: 38 ug/dL (01-02-24 @ 18:00)  Ferritin: 568 ng/mL *H* (01-02-24 @ 18:00)      Urinalysis Basic - ( 02 Jan 2024 18:00 )    Color: x / Appearance: x / SG: x / pH: x  Gluc: 83 mg/dL / Ketone: x  / Bili: x / Urobili: x   Blood: x / Protein: x / Nitrite: x   Leuk Esterase: x / RBC: x / WBC x   Sq Epi: x / Non Sq Epi: x / Bacteria: x    < from: CT Abdomen and Pelvis w/ IV Cont (12.07.23 @ 00:11) >  FINDINGS:  LOWER CHEST: Small left pleural effusion is increased in size compared to   previous exam. Intralobular septal thickening is also increased. Stable   right lower lobe pulmonary nodule. Question presence of additional   smaller bilateral nodules. Focal area of bronchial wall thickening,   partially visualized of the left lower lobe.    LIVER: Focal shunt of left hepatic lobe is less conspicuous on the   current study. The hepatic size and contour normal.  BILE DUCTS: Normal caliber.  GALLBLADDER: There is gallbladder wall thickening versus pericholecystic   fluid. There is cholelithiasis.  SPLEEN: Splenomegaly.  PANCREAS: Within normal limits. Previously described cystic lesion of the   pancreatic neck is not clearly delineated on the current study.  ADRENALS: Within normal limits.  KIDNEYS/URETERS: Within normal limits.    BLADDER: Diffusely thick-walled.  REPRODUCTIVE ORGANS: The uterus and adnexa are unremarkable.    BOWEL: No bowel obstruction. Appendix normal. There is diffuse wall   thickening of the cecum and proximal ascending colon. Additional wall   thickening of the distal descending colon is appreciated. Question   gastric wall thickening versus underdistention.  PERITONEUM: Small pelvic fluid.  VESSELS: Within normal limits.  RETROPERITONEUM/LYMPH NODES: No lymphadenopathy.  ABDOMINAL WALL: Postsurgical changes.  BONES: Diffuse osseous metastasis are again appreciated. T12 and L1   vertebroplasty changes are appreciated.    IMPRESSION:  Increasing left pleural effusion.    Splenomegaly.    Cholelithiasis and gallbladder wall thickening versus pericholecystic   fluid.    Wall thickening of the cecum and proximal ascending colon as well as the   distal descending colon. Findings are suspicious for multifocal colitis.   Infectious or inflammatory etiology should be considered. Ischemia would   be considered less likely.    Stable bilateral pulmonary nodules.    Diffuse osseous metastasis.    Wall thickening of the urinary bladder which may represent cystitis.   Correlate with urinalysis.    Additional findings as above.    < end of copied text >

## 2024-01-03 NOTE — CONSULT NOTE ADULT - SUBJECTIVE AND OBJECTIVE BOX
39 yo F known patient of Dr Chapa at [facility], metastatic breast ca on Enhertu and with prior GIB requiring blood transfusion is admitted to the hospital again due to GIB.        ROS  as per HPI    PAST MEDICAL & SURGICAL HISTORY:  H/O compression fracture of spine      Anxiety      Metastatic breast cancer      H/O pleural effusion      Pericardial effusion      S/P tonsillectomy      H/O chest tube placement  12/23/21      S/P pericardiocentesis  12/28/21      Social History:    FAMILY HISTORY:  FH: CVA (cerebrovascular accident)      MEDICATIONS  (STANDING):  chlorhexidine 2% Cloths 1 Application(s) Topical <User Schedule>  folic acid 1 milliGRAM(s) Oral daily  hydrocortisone hemorrhoidal Suppository 1 Suppository(s) Rectal at bedtime  HYDROmorphone PCA (1 mG/mL) 30 milliLiter(s) PCA Continuous PCA Continuous  memantine 10 milliGRAM(s) Oral two times a day  methadone    Tablet 10 milliGRAM(s) Oral <User Schedule>  methadone    Tablet 15 milliGRAM(s) Oral at bedtime  multivitamin 1 Tablet(s) Oral daily  naloxegol 25 milliGRAM(s) Oral daily  pantoprazole  Injectable 40 milliGRAM(s) IV Push daily  piperacillin/tazobactam IVPB.- 3.375 Gram(s) IV Intermittent once  piperacillin/tazobactam IVPB.. 3.375 Gram(s) IV Intermittent every 8 hours  polyethylene glycol 3350 17 Gram(s) Oral at bedtime  pregabalin 150 milliGRAM(s) Oral three times a day  sodium chloride 0.9% lock flush 3 milliLiter(s) IV Push every 8 hours    MEDICATIONS  (PRN):  acetaminophen     Tablet .. 650 milliGRAM(s) Oral every 6 hours PRN Temp greater or equal to 38C (100.4F), Mild Pain (1 - 3)  aluminum hydroxide/magnesium hydroxide/simethicone Suspension 30 milliLiter(s) Oral every 4 hours PRN Dyspepsia  glycerin Suppository - Adult 1 Suppository(s) Rectal at bedtime PRN Constipation  LORazepam     Tablet 0.5 milliGRAM(s) Oral two times a day PRN Anxiety  melatonin 3 milliGRAM(s) Oral at bedtime PRN Insomnia  naloxone Injectable 0.1 milliGRAM(s) IV Push every 3 minutes PRN For ANY of the following changes in patient status:  A. RR LESS THAN 10 breaths per minute, B. Oxygen saturation LESS THAN 90%, C. Sedation score of 6  ondansetron Injectable 4 milliGRAM(s) IV Push every 8 hours PRN Nausea and/or Vomiting    Vital Signs Last 24 Hrs  T(C): 36.6 (03 Jan 2024 22:04), Max: 36.9 (03 Jan 2024 14:15)  T(F): 97.8 (03 Jan 2024 22:04), Max: 98.5 (03 Jan 2024 14:15)  HR: 72 (03 Jan 2024 22:04) (72 - 96)  BP: 99/66 (03 Jan 2024 22:04) (99/64 - 114/78)  BP(mean): 87 (03 Jan 2024 02:54) (87 - 87)  RR: 18 (03 Jan 2024 22:04) (18 - 18)  SpO2: 100% (03 Jan 2024 22:04) (96% - 100%)    Parameters below as of 03 Jan 2024 14:15  Patient On (Oxygen Delivery Method): room air    Gen - alert and oriented  Heart - s1s2 RRR  Lung - CTA b/l  Abd - mild tender  Ext no edema               Labs:                          8.2    3.34  )-----------( 250      ( 03 Jan 2024 07:28 )             26.8     01-03    142  |  110<H>  |  14.1  ----------------------------<  92  3.5   |  23.0  |  0.48<L>    Ca    8.2<L>      03 Jan 2024 07:28  Phos  3.7     01-03  Mg     1.9     01-03    TPro  5.6<L>  /  Alb  3.4  /  TBili  0.9  /  DBili  x   /  AST  35<H>  /  ALT  16  /  AlkPhos  192<H>  01-02   37 yo F known patient of Dr Chapa at [facility], metastatic breast ca on Enhertu and with prior GIB requiring blood transfusion is admitted to the hospital again due to GIB.        ROS  as per HPI    PAST MEDICAL & SURGICAL HISTORY:  H/O compression fracture of spine      Anxiety      Metastatic breast cancer      H/O pleural effusion      Pericardial effusion      S/P tonsillectomy      H/O chest tube placement  12/23/21      S/P pericardiocentesis  12/28/21      Social History:    FAMILY HISTORY:  FH: CVA (cerebrovascular accident)      MEDICATIONS  (STANDING):  chlorhexidine 2% Cloths 1 Application(s) Topical <User Schedule>  folic acid 1 milliGRAM(s) Oral daily  hydrocortisone hemorrhoidal Suppository 1 Suppository(s) Rectal at bedtime  HYDROmorphone PCA (1 mG/mL) 30 milliLiter(s) PCA Continuous PCA Continuous  memantine 10 milliGRAM(s) Oral two times a day  methadone    Tablet 10 milliGRAM(s) Oral <User Schedule>  methadone    Tablet 15 milliGRAM(s) Oral at bedtime  multivitamin 1 Tablet(s) Oral daily  naloxegol 25 milliGRAM(s) Oral daily  pantoprazole  Injectable 40 milliGRAM(s) IV Push daily  piperacillin/tazobactam IVPB.- 3.375 Gram(s) IV Intermittent once  piperacillin/tazobactam IVPB.. 3.375 Gram(s) IV Intermittent every 8 hours  polyethylene glycol 3350 17 Gram(s) Oral at bedtime  pregabalin 150 milliGRAM(s) Oral three times a day  sodium chloride 0.9% lock flush 3 milliLiter(s) IV Push every 8 hours    MEDICATIONS  (PRN):  acetaminophen     Tablet .. 650 milliGRAM(s) Oral every 6 hours PRN Temp greater or equal to 38C (100.4F), Mild Pain (1 - 3)  aluminum hydroxide/magnesium hydroxide/simethicone Suspension 30 milliLiter(s) Oral every 4 hours PRN Dyspepsia  glycerin Suppository - Adult 1 Suppository(s) Rectal at bedtime PRN Constipation  LORazepam     Tablet 0.5 milliGRAM(s) Oral two times a day PRN Anxiety  melatonin 3 milliGRAM(s) Oral at bedtime PRN Insomnia  naloxone Injectable 0.1 milliGRAM(s) IV Push every 3 minutes PRN For ANY of the following changes in patient status:  A. RR LESS THAN 10 breaths per minute, B. Oxygen saturation LESS THAN 90%, C. Sedation score of 6  ondansetron Injectable 4 milliGRAM(s) IV Push every 8 hours PRN Nausea and/or Vomiting    Vital Signs Last 24 Hrs  T(C): 36.6 (03 Jan 2024 22:04), Max: 36.9 (03 Jan 2024 14:15)  T(F): 97.8 (03 Jan 2024 22:04), Max: 98.5 (03 Jan 2024 14:15)  HR: 72 (03 Jan 2024 22:04) (72 - 96)  BP: 99/66 (03 Jan 2024 22:04) (99/64 - 114/78)  BP(mean): 87 (03 Jan 2024 02:54) (87 - 87)  RR: 18 (03 Jan 2024 22:04) (18 - 18)  SpO2: 100% (03 Jan 2024 22:04) (96% - 100%)    Parameters below as of 03 Jan 2024 14:15  Patient On (Oxygen Delivery Method): room air    Gen - alert and oriented  Heart - s1s2 RRR  Lung - CTA b/l  Abd - mild tender  Ext no edema               Labs:                          8.2    3.34  )-----------( 250      ( 03 Jan 2024 07:28 )             26.8     01-03    142  |  110<H>  |  14.1  ----------------------------<  92  3.5   |  23.0  |  0.48<L>    Ca    8.2<L>      03 Jan 2024 07:28  Phos  3.7     01-03  Mg     1.9     01-03    TPro  5.6<L>  /  Alb  3.4  /  TBili  0.9  /  DBili  x   /  AST  35<H>  /  ALT  16  /  AlkPhos  192<H>  01-02   37 yo F known patient of Dr Chapa at [facility], metastatic breast ca on Enhertu and with prior GIB requiring blood transfusion is admitted to the hospital again due to symptomatic anemia.  Her hemoglobin was found to be 7.5.  She received 1 unit of PRBC and hemoglobin is 8.2 now.  She also has chronic cancer-related pain noted in her back.  She says that in the emergency room she received Dilaudid 2 mg IV which seem to help but says that as inpatient she received a dose of Dilaudid 1 mg which was not enough and would like to request a higher dose.  She says that last night her bowel movement was very dark and she feels it was likely mixed with blood.   She had a PET-CT and brain MRI done within the past few days which overall showed stable disease. Colitis noted on CT. She has been started on IV antibiotics.         ROS  as per HPI    PAST MEDICAL & SURGICAL HISTORY:  H/O compression fracture of spine      Anxiety      Metastatic breast cancer      H/O pleural effusion      Pericardial effusion      S/P tonsillectomy      H/O chest tube placement  12/23/21      S/P pericardiocentesis  12/28/21      Social History:    FAMILY HISTORY:  FH: CVA (cerebrovascular accident)      MEDICATIONS  (STANDING):  chlorhexidine 2% Cloths 1 Application(s) Topical <User Schedule>  folic acid 1 milliGRAM(s) Oral daily  hydrocortisone hemorrhoidal Suppository 1 Suppository(s) Rectal at bedtime  HYDROmorphone PCA (1 mG/mL) 30 milliLiter(s) PCA Continuous PCA Continuous  memantine 10 milliGRAM(s) Oral two times a day  methadone    Tablet 10 milliGRAM(s) Oral <User Schedule>  methadone    Tablet 15 milliGRAM(s) Oral at bedtime  multivitamin 1 Tablet(s) Oral daily  naloxegol 25 milliGRAM(s) Oral daily  pantoprazole  Injectable 40 milliGRAM(s) IV Push daily  piperacillin/tazobactam IVPB.- 3.375 Gram(s) IV Intermittent once  piperacillin/tazobactam IVPB.. 3.375 Gram(s) IV Intermittent every 8 hours  polyethylene glycol 3350 17 Gram(s) Oral at bedtime  pregabalin 150 milliGRAM(s) Oral three times a day  sodium chloride 0.9% lock flush 3 milliLiter(s) IV Push every 8 hours    MEDICATIONS  (PRN):  acetaminophen     Tablet .. 650 milliGRAM(s) Oral every 6 hours PRN Temp greater or equal to 38C (100.4F), Mild Pain (1 - 3)  aluminum hydroxide/magnesium hydroxide/simethicone Suspension 30 milliLiter(s) Oral every 4 hours PRN Dyspepsia  glycerin Suppository - Adult 1 Suppository(s) Rectal at bedtime PRN Constipation  LORazepam     Tablet 0.5 milliGRAM(s) Oral two times a day PRN Anxiety  melatonin 3 milliGRAM(s) Oral at bedtime PRN Insomnia  naloxone Injectable 0.1 milliGRAM(s) IV Push every 3 minutes PRN For ANY of the following changes in patient status:  A. RR LESS THAN 10 breaths per minute, B. Oxygen saturation LESS THAN 90%, C. Sedation score of 6  ondansetron Injectable 4 milliGRAM(s) IV Push every 8 hours PRN Nausea and/or Vomiting    Vital Signs Last 24 Hrs  T(C): 36.6 (03 Jan 2024 22:04), Max: 36.9 (03 Jan 2024 14:15)  T(F): 97.8 (03 Jan 2024 22:04), Max: 98.5 (03 Jan 2024 14:15)  HR: 72 (03 Jan 2024 22:04) (72 - 96)  BP: 99/66 (03 Jan 2024 22:04) (99/64 - 114/78)  BP(mean): 87 (03 Jan 2024 02:54) (87 - 87)  RR: 18 (03 Jan 2024 22:04) (18 - 18)  SpO2: 100% (03 Jan 2024 22:04) (96% - 100%)    Parameters below as of 03 Jan 2024 14:15  Patient On (Oxygen Delivery Method): room air    Gen - alert and oriented  Heart - s1s2 RRR  Lung - CTA b/l  Abd - mild tender  Ext no edema               Labs:                          8.2    3.34  )-----------( 250      ( 03 Jan 2024 07:28 )             26.8     01-03    142  |  110<H>  |  14.1  ----------------------------<  92  3.5   |  23.0  |  0.48<L>    Ca    8.2<L>      03 Jan 2024 07:28  Phos  3.7     01-03  Mg     1.9     01-03    TPro  5.6<L>  /  Alb  3.4  /  TBili  0.9  /  DBili  x   /  AST  35<H>  /  ALT  16  /  AlkPhos  192<H>  01-02   39 yo F known patient of Dr Chapa at [facility], metastatic breast ca on Enhertu and with prior GIB requiring blood transfusion is admitted to the hospital again due to symptomatic anemia.  Her hemoglobin was found to be 7.5.  She received 1 unit of PRBC and hemoglobin is 8.2 now.  She also has chronic cancer-related pain noted in her back.  She says that in the emergency room she received Dilaudid 2 mg IV which seem to help but says that as inpatient she received a dose of Dilaudid 1 mg which was not enough and would like to request a higher dose.  She says that last night her bowel movement was very dark and she feels it was likely mixed with blood.   She had a PET-CT and brain MRI done within the past few days which overall showed stable disease. Colitis noted on CT. She has been started on IV antibiotics.         ROS  as per HPI    PAST MEDICAL & SURGICAL HISTORY:  H/O compression fracture of spine      Anxiety      Metastatic breast cancer      H/O pleural effusion      Pericardial effusion      S/P tonsillectomy      H/O chest tube placement  12/23/21      S/P pericardiocentesis  12/28/21      Social History:    FAMILY HISTORY:  FH: CVA (cerebrovascular accident)      MEDICATIONS  (STANDING):  chlorhexidine 2% Cloths 1 Application(s) Topical <User Schedule>  folic acid 1 milliGRAM(s) Oral daily  hydrocortisone hemorrhoidal Suppository 1 Suppository(s) Rectal at bedtime  HYDROmorphone PCA (1 mG/mL) 30 milliLiter(s) PCA Continuous PCA Continuous  memantine 10 milliGRAM(s) Oral two times a day  methadone    Tablet 10 milliGRAM(s) Oral <User Schedule>  methadone    Tablet 15 milliGRAM(s) Oral at bedtime  multivitamin 1 Tablet(s) Oral daily  naloxegol 25 milliGRAM(s) Oral daily  pantoprazole  Injectable 40 milliGRAM(s) IV Push daily  piperacillin/tazobactam IVPB.- 3.375 Gram(s) IV Intermittent once  piperacillin/tazobactam IVPB.. 3.375 Gram(s) IV Intermittent every 8 hours  polyethylene glycol 3350 17 Gram(s) Oral at bedtime  pregabalin 150 milliGRAM(s) Oral three times a day  sodium chloride 0.9% lock flush 3 milliLiter(s) IV Push every 8 hours    MEDICATIONS  (PRN):  acetaminophen     Tablet .. 650 milliGRAM(s) Oral every 6 hours PRN Temp greater or equal to 38C (100.4F), Mild Pain (1 - 3)  aluminum hydroxide/magnesium hydroxide/simethicone Suspension 30 milliLiter(s) Oral every 4 hours PRN Dyspepsia  glycerin Suppository - Adult 1 Suppository(s) Rectal at bedtime PRN Constipation  LORazepam     Tablet 0.5 milliGRAM(s) Oral two times a day PRN Anxiety  melatonin 3 milliGRAM(s) Oral at bedtime PRN Insomnia  naloxone Injectable 0.1 milliGRAM(s) IV Push every 3 minutes PRN For ANY of the following changes in patient status:  A. RR LESS THAN 10 breaths per minute, B. Oxygen saturation LESS THAN 90%, C. Sedation score of 6  ondansetron Injectable 4 milliGRAM(s) IV Push every 8 hours PRN Nausea and/or Vomiting    Vital Signs Last 24 Hrs  T(C): 36.6 (03 Jan 2024 22:04), Max: 36.9 (03 Jan 2024 14:15)  T(F): 97.8 (03 Jan 2024 22:04), Max: 98.5 (03 Jan 2024 14:15)  HR: 72 (03 Jan 2024 22:04) (72 - 96)  BP: 99/66 (03 Jan 2024 22:04) (99/64 - 114/78)  BP(mean): 87 (03 Jan 2024 02:54) (87 - 87)  RR: 18 (03 Jan 2024 22:04) (18 - 18)  SpO2: 100% (03 Jan 2024 22:04) (96% - 100%)    Parameters below as of 03 Jan 2024 14:15  Patient On (Oxygen Delivery Method): room air    Gen - alert and oriented  Heart - s1s2 RRR  Lung - CTA b/l  Abd - mild tender  Ext no edema               Labs:                          8.2    3.34  )-----------( 250      ( 03 Jan 2024 07:28 )             26.8     01-03    142  |  110<H>  |  14.1  ----------------------------<  92  3.5   |  23.0  |  0.48<L>    Ca    8.2<L>      03 Jan 2024 07:28  Phos  3.7     01-03  Mg     1.9     01-03    TPro  5.6<L>  /  Alb  3.4  /  TBili  0.9  /  DBili  x   /  AST  35<H>  /  ALT  16  /  AlkPhos  192<H>  01-02

## 2024-01-03 NOTE — CONSULT NOTE ADULT - ASSESSMENT
37 y/o female with hx of Stage-4 Breast cancer with diffuse osseous mets, on home methadone for chronic pain  uncontrolled pain on iv dilaudid    will order dPCA 0/0.3/8/6    please dc other opioid when pca is set up    cont methadone 10/10/15 39 y/o female with hx of Stage-4 Breast cancer with diffuse osseous mets, on home methadone for chronic pain  uncontrolled pain on iv dilaudid    will order dPCA 0/0.3/8/6    please dc other opioid when pca is set up    cont methadone 10/10/15

## 2024-01-03 NOTE — CONSULT NOTE ADULT - ASSESSMENT
37 y/o female with hx of Stage lV Breast cancer with diffuse osseous mets, prior malignant pericardial effusion, pleural effusion s/p drain, s/p XRT/Chemo last chemo with Enhertu with plan to switch to Keytruda on 11/29 but has been on hold due to recurrent symptomatic anemia requiring multiple transfusions. Hemoglobin n7.5 gm on admission.     Prior endoscopic evaluation:   VCE on 12/11/23 revealed enteritis  Colonoscopy on 11/20 showed internal hemorrhoids  EGD/ colonoscopy (10/2023) at [place] showed antral erythema, colonoscopy poor prep      Symptomatic anemia:   Hemoglobin 7.5 gm on admission, WILLIE with no evidence od melena, hematochezia, revealed internal hemorrhoids.   Received 1 PRBC, repeat CBC pending. No evidence of overt GI bleed.   Anemia likely multifactorial, bone marrow suppression after chemo. Iron panel with normal Iron, TIBC, ferritin level.   Chronic constipation on Methadone. Will start Movantik for opioid induced constipation  Protonic 40 mg BID for enteritis   Avoid use of NSAIDs  Anusol suppository for internal hemorrhoids   Trend cbc, transfuse to HgB 7 or higher   Will order CTE for further evaluation     37 y/o female with hx of Stage lV Breast cancer with diffuse osseous mets, prior malignant pericardial effusion, pleural effusion s/p drain, s/p XRT/Chemo last chemo with Enhertu with plan to switch to Keytruda on 11/29 but has been on hold due to recurrent symptomatic anemia requiring multiple transfusions. Hemoglobin n7.5 gm on admission.     Prior endoscopic evaluation:   VCE on 12/11/23 revealed enteritis  Colonoscopy on 11/20 showed internal hemorrhoids  EGD/ colonoscopy (10/2023) at [place] showed antral erythema, colonoscopy poor prep      Symptomatic anemia:   Hemoglobin 7.5 gm on admission, WILLIE with no evidence od melena, hematochezia, revealed internal hemorrhoids.   Received 1 PRBC, repeat CBC pending. No evidence of overt GI bleed.   Anemia likely multifactorial, bone marrow suppression after chemo. Iron panel with normal Iron, TIBC, ferritin level.   Chronic constipation on Methadone. Will start Movantik for opioid induced constipation  Protonic 40 mg BID for enteritis   Avoid use of NSAIDs  Anusol suppository for internal hemorrhoids   Trend cbc, transfuse to HgB 7 or higher   Will order CTE for further evaluation     39 y/o female with hx of Stage lV Breast cancer with diffuse osseous mets, prior malignant pericardial effusion, pleural effusion s/p drain, s/p XRT/Chemo last chemo with Enhertu with plan to switch to Keytruda on 11/29 but has been on hold due to recurrent symptomatic anemia requiring multiple transfusions. Hemoglobin n7.5 gm on admission.     Prior endoscopic evaluation:   VCE on 12/11/23 revealed enteritis  Colonoscopy on 11/20 showed internal hemorrhoids  EGD/ colonoscopy (10/2023) at [place] showed antral erythema, colonoscopy poor prep      Symptomatic anemia:   Hemoglobin 7.5 gm on admission, WILLIE with no evidence od melena, hematochezia, revealed internal hemorrhoids.   Received 1 PRBC, repeat CBC pending. No evidence of overt GI bleed.   Anemia likely multifactorial, bone marrow suppression after chemo. Iron panel with normal Iron, TIBC, ferritin level.   Chronic constipation on Methadone. Will start Movantik for opioid induced constipation  Protonic 40 mg BID for enteritis   Avoid use of NSAIDs  Anusol suppository for internal hemorrhoids   Trend cbc, transfuse to HgB 7 or higher   Bowel regimen while taking opioids. Encourage ambulation as tolerated  Diet as tolerated   Will order CTE for further evaluation     39 y/o female with hx of Stage lV Breast cancer with diffuse osseous mets, prior malignant pericardial effusion, pleural effusion s/p drain, s/p XRT/Chemo last chemo with Enhertu with plan to switch to Keytruda on 11/29 but has been on hold due to recurrent symptomatic anemia requiring multiple transfusions. Hemoglobin n7.5 gm on admission.     Prior endoscopic evaluation:   VCE on 12/11/23 revealed enteritis  Colonoscopy on 11/20 showed internal hemorrhoids  EGD/ colonoscopy (10/2023) at [place] showed antral erythema, colonoscopy poor prep      Symptomatic anemia:   Hemoglobin 7.5 gm on admission, WILLIE with no evidence od melena, hematochezia, revealed internal hemorrhoids.   Received 1 PRBC, repeat CBC pending. No evidence of overt GI bleed.   Anemia likely multifactorial, bone marrow suppression after chemo. Iron panel with normal Iron, TIBC, ferritin level.   Chronic constipation on Methadone. Will start Movantik for opioid induced constipation  Protonic 40 mg BID for enteritis   Avoid use of NSAIDs  Anusol suppository for internal hemorrhoids   Trend cbc, transfuse to HgB 7 or higher   Bowel regimen while taking opioids. Encourage ambulation as tolerated  Diet as tolerated   Will order CTE for further evaluation

## 2024-01-03 NOTE — CHART NOTE - NSCHARTNOTEFT_GEN_A_CORE
Reassessed the patient after reported 10/10 mid abdominal pain which started after having a bowel movement this afternoon. Described as sharp, non-radiating. Also reported black color BM. WILLIE performed this morning was dark brown color. On physical exam her abdomen soft, non distended, remain unchanged from morning. No rebound tenderness. Repeat WILLIE with brown color stool, no melena or hematochezia.  +internal hemorrhoid.     Plan:  Serum lactate level  Serial abdominal exam,   CT abdomen with IV contrast is ordered for further evaluation  Continue Protonix   Please start Empiric antibiotics, first dose now  Please keep NPO until CT abd performed   CT Enterography ordered this morning will be scheduled for tomorrow morning as patient had regular diet for lunch. Please keep NPO after midnight for CTE Reassessed the patient after reported 10/10 mid abdominal pain which started after having a bowel movement this afternoon. Described as sharp, non-radiating. Also reported black color BM. WILLIE performed this morning was dark brown color. On physical exam her abdomen soft, non distended, remain unchanged from morning. No rebound tenderness. Repeat WILLIE with brown color stool, no melena or hematochezia.  +internal hemorrhoid.     Plan:  Serum lactate level  Serial abdominal exam,   CT abdomen with IV contrast is ordered for further evaluation  Continue Protonix   Please start Empiric antibiotics, first dose now  Please keep NPO until CT abd performed   CT Enterography ordered this morning will be scheduled for tomorrow morning as patient had regular diet for lunch. Please keep NPO after midnight for CTE.     GI Attending Addendum: I evaluated and examined this pt. with my ACP once again and agree with the above assessment and management plan.

## 2024-01-04 ENCOUNTER — TRANSCRIPTION ENCOUNTER (OUTPATIENT)
Age: 39
End: 2024-01-04

## 2024-01-04 LAB
ANION GAP SERPL CALC-SCNC: 10 MMOL/L — SIGNIFICANT CHANGE UP (ref 5–17)
ANION GAP SERPL CALC-SCNC: 10 MMOL/L — SIGNIFICANT CHANGE UP (ref 5–17)
BASOPHILS # BLD AUTO: 0 K/UL — SIGNIFICANT CHANGE UP (ref 0–0.2)
BASOPHILS # BLD AUTO: 0 K/UL — SIGNIFICANT CHANGE UP (ref 0–0.2)
BASOPHILS NFR BLD AUTO: 0 % — SIGNIFICANT CHANGE UP (ref 0–2)
BASOPHILS NFR BLD AUTO: 0 % — SIGNIFICANT CHANGE UP (ref 0–2)
BUN SERPL-MCNC: 12.2 MG/DL — SIGNIFICANT CHANGE UP (ref 8–20)
BUN SERPL-MCNC: 12.2 MG/DL — SIGNIFICANT CHANGE UP (ref 8–20)
CALCIUM SERPL-MCNC: 8.5 MG/DL — SIGNIFICANT CHANGE UP (ref 8.4–10.5)
CALCIUM SERPL-MCNC: 8.5 MG/DL — SIGNIFICANT CHANGE UP (ref 8.4–10.5)
CHLORIDE SERPL-SCNC: 108 MMOL/L — SIGNIFICANT CHANGE UP (ref 96–108)
CHLORIDE SERPL-SCNC: 108 MMOL/L — SIGNIFICANT CHANGE UP (ref 96–108)
CO2 SERPL-SCNC: 22 MMOL/L — SIGNIFICANT CHANGE UP (ref 22–29)
CO2 SERPL-SCNC: 22 MMOL/L — SIGNIFICANT CHANGE UP (ref 22–29)
CREAT SERPL-MCNC: 0.49 MG/DL — LOW (ref 0.5–1.3)
CREAT SERPL-MCNC: 0.49 MG/DL — LOW (ref 0.5–1.3)
EGFR: 124 ML/MIN/1.73M2 — SIGNIFICANT CHANGE UP
EGFR: 124 ML/MIN/1.73M2 — SIGNIFICANT CHANGE UP
EOSINOPHIL # BLD AUTO: 0.1 K/UL — SIGNIFICANT CHANGE UP (ref 0–0.5)
EOSINOPHIL # BLD AUTO: 0.1 K/UL — SIGNIFICANT CHANGE UP (ref 0–0.5)
EOSINOPHIL NFR BLD AUTO: 2.7 % — SIGNIFICANT CHANGE UP (ref 0–6)
EOSINOPHIL NFR BLD AUTO: 2.7 % — SIGNIFICANT CHANGE UP (ref 0–6)
GLUCOSE SERPL-MCNC: 90 MG/DL — SIGNIFICANT CHANGE UP (ref 70–99)
GLUCOSE SERPL-MCNC: 90 MG/DL — SIGNIFICANT CHANGE UP (ref 70–99)
HCT VFR BLD CALC: 29.3 % — LOW (ref 34.5–45)
HCT VFR BLD CALC: 29.3 % — LOW (ref 34.5–45)
HGB BLD-MCNC: 9.1 G/DL — LOW (ref 11.5–15.5)
HGB BLD-MCNC: 9.1 G/DL — LOW (ref 11.5–15.5)
IMM GRANULOCYTES NFR BLD AUTO: 0.3 % — SIGNIFICANT CHANGE UP (ref 0–0.9)
IMM GRANULOCYTES NFR BLD AUTO: 0.3 % — SIGNIFICANT CHANGE UP (ref 0–0.9)
LYMPHOCYTES # BLD AUTO: 1.56 K/UL — SIGNIFICANT CHANGE UP (ref 1–3.3)
LYMPHOCYTES # BLD AUTO: 1.56 K/UL — SIGNIFICANT CHANGE UP (ref 1–3.3)
LYMPHOCYTES # BLD AUTO: 42.2 % — SIGNIFICANT CHANGE UP (ref 13–44)
LYMPHOCYTES # BLD AUTO: 42.2 % — SIGNIFICANT CHANGE UP (ref 13–44)
MCHC RBC-ENTMCNC: 28.4 PG — SIGNIFICANT CHANGE UP (ref 27–34)
MCHC RBC-ENTMCNC: 28.4 PG — SIGNIFICANT CHANGE UP (ref 27–34)
MCHC RBC-ENTMCNC: 31.1 GM/DL — LOW (ref 32–36)
MCHC RBC-ENTMCNC: 31.1 GM/DL — LOW (ref 32–36)
MCV RBC AUTO: 91.6 FL — SIGNIFICANT CHANGE UP (ref 80–100)
MCV RBC AUTO: 91.6 FL — SIGNIFICANT CHANGE UP (ref 80–100)
MONOCYTES # BLD AUTO: 0.34 K/UL — SIGNIFICANT CHANGE UP (ref 0–0.9)
MONOCYTES # BLD AUTO: 0.34 K/UL — SIGNIFICANT CHANGE UP (ref 0–0.9)
MONOCYTES NFR BLD AUTO: 9.2 % — SIGNIFICANT CHANGE UP (ref 2–14)
MONOCYTES NFR BLD AUTO: 9.2 % — SIGNIFICANT CHANGE UP (ref 2–14)
NEUTROPHILS # BLD AUTO: 1.69 K/UL — LOW (ref 1.8–7.4)
NEUTROPHILS # BLD AUTO: 1.69 K/UL — LOW (ref 1.8–7.4)
NEUTROPHILS NFR BLD AUTO: 45.6 % — SIGNIFICANT CHANGE UP (ref 43–77)
NEUTROPHILS NFR BLD AUTO: 45.6 % — SIGNIFICANT CHANGE UP (ref 43–77)
PLATELET # BLD AUTO: 345 K/UL — SIGNIFICANT CHANGE UP (ref 150–400)
PLATELET # BLD AUTO: 345 K/UL — SIGNIFICANT CHANGE UP (ref 150–400)
POTASSIUM SERPL-MCNC: 3.7 MMOL/L — SIGNIFICANT CHANGE UP (ref 3.5–5.3)
POTASSIUM SERPL-MCNC: 3.7 MMOL/L — SIGNIFICANT CHANGE UP (ref 3.5–5.3)
POTASSIUM SERPL-SCNC: 3.7 MMOL/L — SIGNIFICANT CHANGE UP (ref 3.5–5.3)
POTASSIUM SERPL-SCNC: 3.7 MMOL/L — SIGNIFICANT CHANGE UP (ref 3.5–5.3)
RBC # BLD: 3.2 M/UL — LOW (ref 3.8–5.2)
RBC # BLD: 3.2 M/UL — LOW (ref 3.8–5.2)
RBC # FLD: 23.9 % — HIGH (ref 10.3–14.5)
RBC # FLD: 23.9 % — HIGH (ref 10.3–14.5)
SODIUM SERPL-SCNC: 140 MMOL/L — SIGNIFICANT CHANGE UP (ref 135–145)
SODIUM SERPL-SCNC: 140 MMOL/L — SIGNIFICANT CHANGE UP (ref 135–145)
WBC # BLD: 3.7 K/UL — LOW (ref 3.8–10.5)
WBC # BLD: 3.7 K/UL — LOW (ref 3.8–10.5)
WBC # FLD AUTO: 3.7 K/UL — LOW (ref 3.8–10.5)
WBC # FLD AUTO: 3.7 K/UL — LOW (ref 3.8–10.5)

## 2024-01-04 PROCEDURE — 99233 SBSQ HOSP IP/OBS HIGH 50: CPT

## 2024-01-04 PROCEDURE — 99232 SBSQ HOSP IP/OBS MODERATE 35: CPT

## 2024-01-04 PROCEDURE — 74177 CT ABD & PELVIS W/CONTRAST: CPT | Mod: 26

## 2024-01-04 RX ORDER — FLUOXETINE HCL 10 MG
80 CAPSULE ORAL AT BEDTIME
Refills: 0 | Status: DISCONTINUED | OUTPATIENT
Start: 2024-01-04 | End: 2024-01-10

## 2024-01-04 RX ADMIN — SODIUM CHLORIDE 3 MILLILITER(S): 9 INJECTION INTRAMUSCULAR; INTRAVENOUS; SUBCUTANEOUS at 13:09

## 2024-01-04 RX ADMIN — NALOXEGOL OXALATE 25 MILLIGRAM(S): 12.5 TABLET, FILM COATED ORAL at 12:46

## 2024-01-04 RX ADMIN — Medication 80 MILLIGRAM(S): at 21:36

## 2024-01-04 RX ADMIN — CHLORHEXIDINE GLUCONATE 1 APPLICATION(S): 213 SOLUTION TOPICAL at 05:49

## 2024-01-04 RX ADMIN — Medication 150 MILLIGRAM(S): at 05:37

## 2024-01-04 RX ADMIN — METHADONE HYDROCHLORIDE 15 MILLIGRAM(S): 40 TABLET ORAL at 21:36

## 2024-01-04 RX ADMIN — MEMANTINE HYDROCHLORIDE 10 MILLIGRAM(S): 10 TABLET ORAL at 05:38

## 2024-01-04 RX ADMIN — PIPERACILLIN AND TAZOBACTAM 25 GRAM(S): 4; .5 INJECTION, POWDER, LYOPHILIZED, FOR SOLUTION INTRAVENOUS at 14:36

## 2024-01-04 RX ADMIN — PIPERACILLIN AND TAZOBACTAM 25 GRAM(S): 4; .5 INJECTION, POWDER, LYOPHILIZED, FOR SOLUTION INTRAVENOUS at 21:42

## 2024-01-04 RX ADMIN — HYDROMORPHONE HYDROCHLORIDE 30 MILLILITER(S): 2 INJECTION INTRAMUSCULAR; INTRAVENOUS; SUBCUTANEOUS at 07:35

## 2024-01-04 RX ADMIN — MEMANTINE HYDROCHLORIDE 10 MILLIGRAM(S): 10 TABLET ORAL at 17:32

## 2024-01-04 RX ADMIN — HYDROMORPHONE HYDROCHLORIDE 30 MILLILITER(S): 2 INJECTION INTRAMUSCULAR; INTRAVENOUS; SUBCUTANEOUS at 19:41

## 2024-01-04 RX ADMIN — Medication 150 MILLIGRAM(S): at 14:36

## 2024-01-04 RX ADMIN — Medication 1 MILLIGRAM(S): at 12:45

## 2024-01-04 RX ADMIN — Medication 1 TABLET(S): at 12:46

## 2024-01-04 RX ADMIN — PANTOPRAZOLE SODIUM 40 MILLIGRAM(S): 20 TABLET, DELAYED RELEASE ORAL at 12:45

## 2024-01-04 RX ADMIN — SODIUM CHLORIDE 3 MILLILITER(S): 9 INJECTION INTRAMUSCULAR; INTRAVENOUS; SUBCUTANEOUS at 05:39

## 2024-01-04 RX ADMIN — METHADONE HYDROCHLORIDE 10 MILLIGRAM(S): 40 TABLET ORAL at 05:37

## 2024-01-04 RX ADMIN — METHADONE HYDROCHLORIDE 10 MILLIGRAM(S): 40 TABLET ORAL at 14:41

## 2024-01-04 RX ADMIN — Medication 0.5 MILLIGRAM(S): at 21:36

## 2024-01-04 RX ADMIN — SODIUM CHLORIDE 3 MILLILITER(S): 9 INJECTION INTRAMUSCULAR; INTRAVENOUS; SUBCUTANEOUS at 21:00

## 2024-01-04 RX ADMIN — Medication 150 MILLIGRAM(S): at 21:36

## 2024-01-04 NOTE — DISCHARGE NOTE PROVIDER - ATTENDING DISCHARGE PHYSICAL EXAMINATION:
PHYSICAL EXAM:    GENERAL: middle aged female, sitting in bed, NAD  HEAD:  Atraumatic, Normocephalic  EYES: EOMI, PERRLA, conjunctiva and sclera clear  ENMT: Moist mucous membranes  NECK: Supple  NERVOUS SYSTEM:  Alert & Oriented X3, Motor Strength 5/5 B/L upper and lower extremities   CHEST/LUNG: Clear to auscultation bilaterally   HEART: Regular rate and rhythm; +S1/S2  ABDOMEN: Soft, Nontender, Nondistended; Bowel sounds present  EXTREMITIES:  no pedal edema

## 2024-01-04 NOTE — PROGRESS NOTE ADULT - SUBJECTIVE AND OBJECTIVE BOX
Interval Hx:  Patient seen during rounds  Patient reports pain to be controlled on current medications  Patient denies sedation with medications     Analgesic Dosing for past 24 hours reviewed as below:    HYDROmorphone  Injectable   1 milliGRAM(s) IV Push (01-03-24 @ 15:24)    memantine   10 milliGRAM(s) Oral (01-04-24 @ 05:38)   10 milliGRAM(s) Oral (01-03-24 @ 17:45)    methadone    Tablet   15 milliGRAM(s) Oral (01-03-24 @ 22:33)    methadone    Tablet   10 milliGRAM(s) Oral (01-04-24 @ 05:37)   10 milliGRAM(s) Oral (01-03-24 @ 14:11)    pregabalin   150 milliGRAM(s) Oral (01-04-24 @ 05:37)   150 milliGRAM(s) Oral (01-03-24 @ 22:33)   150 milliGRAM(s) Oral (01-03-24 @ 14:11)          T(C): 36.9 (01-04-24 @ 10:39), Max: 36.9 (01-03-24 @ 14:15)  HR: 81 (01-04-24 @ 10:39) (72 - 96)  BP: 100/63 (01-04-24 @ 10:39) (96/62 - 114/78)  RR: 18 (01-04-24 @ 10:39) (18 - 18)  SpO2: 96% (01-04-24 @ 10:39) (96% - 100%)        acetaminophen     Tablet .. 650 milliGRAM(s) Oral every 6 hours PRN  aluminum hydroxide/magnesium hydroxide/simethicone Suspension 30 milliLiter(s) Oral every 4 hours PRN  chlorhexidine 2% Cloths 1 Application(s) Topical <User Schedule>  folic acid 1 milliGRAM(s) Oral daily  glycerin Suppository - Adult 1 Suppository(s) Rectal at bedtime PRN  hydrocortisone hemorrhoidal Suppository 1 Suppository(s) Rectal at bedtime  HYDROmorphone PCA (1 mG/mL) 30 milliLiter(s) PCA Continuous PCA Continuous  LORazepam     Tablet 0.5 milliGRAM(s) Oral two times a day PRN  melatonin 3 milliGRAM(s) Oral at bedtime PRN  memantine 10 milliGRAM(s) Oral two times a day  methadone    Tablet 10 milliGRAM(s) Oral <User Schedule>  methadone    Tablet 15 milliGRAM(s) Oral at bedtime  multivitamin 1 Tablet(s) Oral daily  naloxegol 25 milliGRAM(s) Oral daily  naloxone Injectable 0.1 milliGRAM(s) IV Push every 3 minutes PRN  ondansetron Injectable 4 milliGRAM(s) IV Push every 8 hours PRN  pantoprazole  Injectable 40 milliGRAM(s) IV Push daily  piperacillin/tazobactam IVPB.- 3.375 Gram(s) IV Intermittent once  piperacillin/tazobactam IVPB.. 3.375 Gram(s) IV Intermittent every 8 hours  polyethylene glycol 3350 17 Gram(s) Oral at bedtime  pregabalin 150 milliGRAM(s) Oral three times a day  sodium chloride 0.9% lock flush 3 milliLiter(s) IV Push every 8 hours                          9.1    3.70  )-----------( 345      ( 04 Jan 2024 06:16 )             29.3     01-04    140  |  108  |  12.2  ----------------------------<  90  3.7   |  22.0  |  0.49<L>    Ca    8.5      04 Jan 2024 06:16  Phos  3.7     01-03  Mg     1.9     01-03    TPro  5.6<L>  /  Alb  3.4  /  TBili  0.9  /  DBili  x   /  AST  35<H>  /  ALT  16  /  AlkPhos  192<H>  01-02    PT/INR - ( 02 Jan 2024 18:00 )   PT: 11.8 sec;   INR: 1.07 ratio         PTT - ( 02 Jan 2024 18:00 )  PTT:31.8 sec  Urinalysis Basic - ( 04 Jan 2024 06:16 )    Color: x / Appearance: x / SG: x / pH: x  Gluc: 90 mg/dL / Ketone: x  / Bili: x / Urobili: x   Blood: x / Protein: x / Nitrite: x   Leuk Esterase: x / RBC: x / WBC x   Sq Epi: x / Non Sq Epi: x / Bacteria: x        Pain Service   [phone] Interval Hx:  Patient seen during rounds  Patient reports pain to be controlled on current medications  Patient denies sedation with medications     Analgesic Dosing for past 24 hours reviewed as below:    HYDROmorphone  Injectable   1 milliGRAM(s) IV Push (01-03-24 @ 15:24)    memantine   10 milliGRAM(s) Oral (01-04-24 @ 05:38)   10 milliGRAM(s) Oral (01-03-24 @ 17:45)    methadone    Tablet   15 milliGRAM(s) Oral (01-03-24 @ 22:33)    methadone    Tablet   10 milliGRAM(s) Oral (01-04-24 @ 05:37)   10 milliGRAM(s) Oral (01-03-24 @ 14:11)    pregabalin   150 milliGRAM(s) Oral (01-04-24 @ 05:37)   150 milliGRAM(s) Oral (01-03-24 @ 22:33)   150 milliGRAM(s) Oral (01-03-24 @ 14:11)          T(C): 36.9 (01-04-24 @ 10:39), Max: 36.9 (01-03-24 @ 14:15)  HR: 81 (01-04-24 @ 10:39) (72 - 96)  BP: 100/63 (01-04-24 @ 10:39) (96/62 - 114/78)  RR: 18 (01-04-24 @ 10:39) (18 - 18)  SpO2: 96% (01-04-24 @ 10:39) (96% - 100%)        acetaminophen     Tablet .. 650 milliGRAM(s) Oral every 6 hours PRN  aluminum hydroxide/magnesium hydroxide/simethicone Suspension 30 milliLiter(s) Oral every 4 hours PRN  chlorhexidine 2% Cloths 1 Application(s) Topical <User Schedule>  folic acid 1 milliGRAM(s) Oral daily  glycerin Suppository - Adult 1 Suppository(s) Rectal at bedtime PRN  hydrocortisone hemorrhoidal Suppository 1 Suppository(s) Rectal at bedtime  HYDROmorphone PCA (1 mG/mL) 30 milliLiter(s) PCA Continuous PCA Continuous  LORazepam     Tablet 0.5 milliGRAM(s) Oral two times a day PRN  melatonin 3 milliGRAM(s) Oral at bedtime PRN  memantine 10 milliGRAM(s) Oral two times a day  methadone    Tablet 10 milliGRAM(s) Oral <User Schedule>  methadone    Tablet 15 milliGRAM(s) Oral at bedtime  multivitamin 1 Tablet(s) Oral daily  naloxegol 25 milliGRAM(s) Oral daily  naloxone Injectable 0.1 milliGRAM(s) IV Push every 3 minutes PRN  ondansetron Injectable 4 milliGRAM(s) IV Push every 8 hours PRN  pantoprazole  Injectable 40 milliGRAM(s) IV Push daily  piperacillin/tazobactam IVPB.- 3.375 Gram(s) IV Intermittent once  piperacillin/tazobactam IVPB.. 3.375 Gram(s) IV Intermittent every 8 hours  polyethylene glycol 3350 17 Gram(s) Oral at bedtime  pregabalin 150 milliGRAM(s) Oral three times a day  sodium chloride 0.9% lock flush 3 milliLiter(s) IV Push every 8 hours                          9.1    3.70  )-----------( 345      ( 04 Jan 2024 06:16 )             29.3     01-04    140  |  108  |  12.2  ----------------------------<  90  3.7   |  22.0  |  0.49<L>    Ca    8.5      04 Jan 2024 06:16  Phos  3.7     01-03  Mg     1.9     01-03    TPro  5.6<L>  /  Alb  3.4  /  TBili  0.9  /  DBili  x   /  AST  35<H>  /  ALT  16  /  AlkPhos  192<H>  01-02    PT/INR - ( 02 Jan 2024 18:00 )   PT: 11.8 sec;   INR: 1.07 ratio         PTT - ( 02 Jan 2024 18:00 )  PTT:31.8 sec  Urinalysis Basic - ( 04 Jan 2024 06:16 )    Color: x / Appearance: x / SG: x / pH: x  Gluc: 90 mg/dL / Ketone: x  / Bili: x / Urobili: x   Blood: x / Protein: x / Nitrite: x   Leuk Esterase: x / RBC: x / WBC x   Sq Epi: x / Non Sq Epi: x / Bacteria: x        Pain Service   [phone]

## 2024-01-04 NOTE — DISCHARGE NOTE PROVIDER - HOSPITAL COURSE
39 y/o female with recurrent symptomatic anemia, Leukopenia, Stage-4 breast cancer    1. Recurrent symptomatic anemia:  -Multi-factorial from likely recurrent IH bleeding, gastritis, prior chemo, active malignancy   -Prior colonoscopy reviewed in 11/23  -Reports "enteritis" on Capsular endoscopy Started on Empiric IV Zosyn   - CTA results pending   -IV PPI  - Hb/hct stable  -Iron studies done by ED not interpretable based on multiple recent PRBC transfusions   -Not on NSAIDs or AC  -Miralax HS, glycerine suppositories prn  - MOvantik started for constipation-  chronic high dose opioids needed for cancer related pain   -Onc f/u with Dr. Chapa       37 y/o female with recurrent symptomatic anemia, Leukopenia, Stage-4 breast cancer    1. Recurrent symptomatic anemia:  -Multi-factorial from likely recurrent IH bleeding, gastritis, prior chemo, active malignancy   -Prior colonoscopy reviewed in 11/23  -Reports "enteritis" on Capsular endoscopy Started on Empiric IV Zosyn   - CTA results pending   -IV PPI  - Hb/hct stable  -Iron studies done by ED not interpretable based on multiple recent PRBC transfusions   -Not on NSAIDs or AC  -Miralax HS, glycerine suppositories prn  - MOvantik started for constipation-  chronic high dose opioids needed for cancer related pain   -Onc f/u with Dr. Chapa       37 y/o female with recurrent symptomatic anemia, Leukopenia, Stage-4 breast cancer         39 y/o female with recurrent symptomatic anemia, Leukopenia, Stage-4 breast cancer         37 y/o female with recurrent symptomatic anemia, Leukopenia, Stage-4 breast cancer        Patient is a 37 y/o female with history of Stage-4 breast cancer admitted with recurrent symptomatic anemia. Recurrent symptomatic anemia is multi-factorial from likely gastritis/colitis, prior chemo, active malignancy; GIB ruled out, prior colonoscopy reviewed in 11/23. rec to continue Anusol suppositories for hemorrhoids daily  Capsular endoscopy  reports eneteritis and CTE with gastritis and colitis. pt is to continue PPI and complete epimeric course of abx. Recent EGD with nonerosive gastritis; no active bleeding. No further GI work up required in patient and recommendation for Hem/onc follow up as outpatient BM biopsy.  Pt Leukopenia (ANC > 1000) most likely related to malignancy/prior chemo for treatment Stage-4 Breast cancer with brain mets  Pt is to c/w prozac for Anxiety and Movantik /miralax for constipation.      39 y/o female with recurrent symptomatic anemia, Leukopenia, Stage-4 breast cancer        Patient is a 39 y/o female with history of Stage-4 breast cancer admitted with recurrent symptomatic anemia. Recurrent symptomatic anemia is multi-factorial from likely gastritis/colitis, prior chemo, active malignancy; GIB ruled out, prior colonoscopy reviewed in 11/23. rec to continue Anusol suppositories for hemorrhoids daily  Capsular endoscopy  reports eneteritis and CTE with gastritis and colitis. pt is to continue PPI and complete epimeric course of abx. Recent EGD with nonerosive gastritis; no active bleeding. No further GI work up required in patient and recommendation for Hem/onc follow up as outpatient BM biopsy.  Pt Leukopenia (ANC > 1000) most likely related to malignancy/prior chemo for treatment Stage-4 Breast cancer with brain mets  Pt is to c/w prozac for Anxiety and Movantik /miralax for constipation.      Patient is a 37 y/o female with history of Stage-4 breast cancer admitted with recurrent symptomatic anemia. Recurrent symptomatic anemia is multi-factorial from likely gastritis/colitis, prior chemo, active malignancy; GIB ruled out, prior colonoscopy reviewed in 11/23. rec to continue Anusol suppositories for hemorrhoids daily  Capsular endoscopy  reports eneteritis and CTE with gastritis and colitis. Patiet is to continue PPI and completed epimeric course of abx for colitis. Recent EGD with nonerosive gastritis; no active bleeding. No further GI work up required in patient and recommendation for Hem/onc follow up as outpatient BM biopsy. Pt Leukopenia (ANC > 1000) most likely related to malignancy/prior chemo for treatment Stage-4 Breast cancer with brain mets. Pt is to c/w prozac for Anxiety and bowel regimen to ensure daily BMs.

## 2024-01-04 NOTE — PROGRESS NOTE ADULT - SUBJECTIVE AND OBJECTIVE BOX
Chief Complaint:  Patient is a 38y old  Female who presents with a chief complaint of Symptomatic anemia (04 Jan 2024 12:46)      Interval HPI/ 24 hr events: Patient seen and examined at bedside. Patient reports feeling better today. Reports last bowel movement was yesterday. Denies any bloody bowel movements overnight. Denies nausea, vomiting, diarrhea, abdominal pain, hematemesis, hematochezia, melena. Latest vitals are overall stable, no leukocytosis, Hgb, LFTs stable.       REVIEW OF SYSTEMS:   General: Negative  HEENT: Negative  CV: Negative  Respiratory: Negative  GI: See HPI  : Negative  MSK: Negative  Hematologic: Negative  Skin: Negative    MEDICATIONS:   MEDICATIONS  (STANDING):  chlorhexidine 2% Cloths 1 Application(s) Topical <User Schedule>  folic acid 1 milliGRAM(s) Oral daily  hydrocortisone hemorrhoidal Suppository 1 Suppository(s) Rectal at bedtime  HYDROmorphone PCA (1 mG/mL) 30 milliLiter(s) PCA Continuous PCA Continuous  memantine 10 milliGRAM(s) Oral two times a day  methadone    Tablet 15 milliGRAM(s) Oral at bedtime  methadone    Tablet 10 milliGRAM(s) Oral <User Schedule>  multivitamin 1 Tablet(s) Oral daily  naloxegol 25 milliGRAM(s) Oral daily  pantoprazole  Injectable 40 milliGRAM(s) IV Push daily  piperacillin/tazobactam IVPB.- 3.375 Gram(s) IV Intermittent once  piperacillin/tazobactam IVPB.. 3.375 Gram(s) IV Intermittent every 8 hours  polyethylene glycol 3350 17 Gram(s) Oral at bedtime  pregabalin 150 milliGRAM(s) Oral three times a day  sodium chloride 0.9% lock flush 3 milliLiter(s) IV Push every 8 hours    MEDICATIONS  (PRN):  acetaminophen     Tablet .. 650 milliGRAM(s) Oral every 6 hours PRN Temp greater or equal to 38C (100.4F), Mild Pain (1 - 3)  aluminum hydroxide/magnesium hydroxide/simethicone Suspension 30 milliLiter(s) Oral every 4 hours PRN Dyspepsia  glycerin Suppository - Adult 1 Suppository(s) Rectal at bedtime PRN Constipation  LORazepam     Tablet 0.5 milliGRAM(s) Oral two times a day PRN Anxiety  melatonin 3 milliGRAM(s) Oral at bedtime PRN Insomnia  naloxone Injectable 0.1 milliGRAM(s) IV Push every 3 minutes PRN For ANY of the following changes in patient status:  A. RR LESS THAN 10 breaths per minute, B. Oxygen saturation LESS THAN 90%, C. Sedation score of 6  ondansetron Injectable 4 milliGRAM(s) IV Push every 8 hours PRN Nausea and/or Vomiting      Packed Red Cells Order:  1 Unit  Indication: Anemia due to Active Hemorrhage - Medical Conditions e.  Infuse Unit : 3 Hours (01-02-24 @ 19:43)        DIET:  Diet, Regular (01-04-24 @ 12:43) [Active]          ALLERGIES:   Allergies    Perjeta (Other (Severe))  pertuzumab (Other (Severe))    Intolerances        VITAL SIGNS:   Vital Signs Last 24 Hrs  T(C): 36.9 (04 Jan 2024 10:39), Max: 36.9 (03 Jan 2024 14:15)  T(F): 98.4 (04 Jan 2024 10:39), Max: 98.5 (03 Jan 2024 14:15)  HR: 81 (04 Jan 2024 10:39) (72 - 96)  BP: 100/63 (04 Jan 2024 10:39) (96/62 - 114/78)  BP(mean): --  RR: 18 (04 Jan 2024 10:39) (18 - 18)  SpO2: 96% (04 Jan 2024 10:39) (96% - 100%)    Parameters below as of 04 Jan 2024 10:39  Patient On (Oxygen Delivery Method): room air      I&O's Summary      PHYSICAL EXAM:   GENERAL:  No acute distress  HEENT:  NC/AT, conjunctiva clear, sclera anicteric  CHEST:  No increased effort  HEART:  Regular rate  ABDOMEN:  Soft, non-tender, non-distended, normoactive bowel sounds, no rebound or guarding  EXTREMITIES: No edema  SKIN:  Warm, dry  NEURO:  Calm, cooperative    LABS:                        9.1    3.70  )-----------( 345      ( 04 Jan 2024 06:16 )             29.3     Hemoglobin: 9.1 g/dL (01-04-24 @ 06:16)  Hemoglobin: 8.2 g/dL (01-03-24 @ 07:28)  Hemoglobin: 7.5 g/dL (01-02-24 @ 18:00)    01-04    140  |  108  |  12.2  ----------------------------<  90  3.7   |  22.0  |  0.49<L>    Ca    8.5      04 Jan 2024 06:16  Phos  3.7     01-03  Mg     1.9     01-03    TPro  5.6<L>  /  Alb  3.4  /  TBili  0.9  /  DBili  x   /  AST  35<H>  /  ALT  16  /  AlkPhos  192<H>  01-02    LIVER FUNCTIONS - ( 02 Jan 2024 18:00 )  Alb: 3.4 g/dL / Pro: 5.6 g/dL / ALK PHOS: 192 U/L / ALT: 16 U/L / AST: 35 U/L / GGT: x             PT/INR - ( 02 Jan 2024 18:00 )   PT: 11.8 sec;   INR: 1.07 ratio         PTT - ( 02 Jan 2024 18:00 )  PTT:31.8 sec    RADIOLOGY & ADDITIONAL STUDIES:      ACC: [id number] EXAM:  CT ABDOMEN AND PELVIS IC   ORDERED BY: LEXX MICHAEL     PROCEDURE DATE:  01/03/2024          INTERPRETATION:  CLINICAL INFORMATION: Abdominal pain and anemia.    COMPARISON: CT abdomen pelvis 12/27/2023    CONTRAST/COMPLICATIONS:  IV Contrast: Omnipaque 350  80 cc administered   20 cc discarded  Oral Contrast: NONE  Complications: None reported at time of study completion    PROCEDURE:  CT of the Abdomen and Pelvis was performed.  Sagittal and coronal reformats were performed.    FINDINGS:  LOWER CHEST: Central venous catheter in the right atrium.    LIVER: Within normal limits.  BILE DUCTS: Normal caliber.  GALLBLADDER: Within normal limits.  SPLEEN: Splenomegaly.  PANCREAS: Within normal limits.  ADRENALS: Within normal limits.  KIDNEYS/URETERS: Within normal limits.    BLADDER: Within normal limits.  REPRODUCTIVE ORGANS: Uterus and adnexa within normal limits.    BOWEL: No bowel obstruction. Wall thickening of the ascending and   rectosigmoid colon which may be due to underdistention. Moderate stool in   the remainder of the colon.  PERITONEUM: Trace pelvic ascites.  VESSELS: Within normal limits.  RETROPERITONEUM/LYMPH NODES: No lymphadenopathy. No retroperitoneal   hematoma.  ABDOMINAL WALL: Within normal limits.  BONES: Redemonstrated mixed sclerotic and lytic osseous metastatic   disease. T12 and L1 vertebral cement.    IMPRESSION:  No retroperitoneal hematoma.    Wall thickening of the ascending and rectosigmoid colon which may be due   to underdistention. Moderate stool in the remainder of the colon.    Trace ascites.      --- End of Report ---    SHAWN KOROMA MD; Attending Radiologist  This document has been electronically signed. Cristhian  3 2024  8:51PM  01-03-24 @ 17:19       Chief Complaint:  Patient is a 38y old  Female who presents with a chief complaint of Symptomatic anemia (04 Jan 2024 12:46)      Interval HPI/ 24 hr events: Patient seen and examined at bedside. Patient reports feeling better today. Reports last bowel movement was yesterday. Denies any bloody bowel movements overnight. Denies nausea, vomiting, diarrhea, abdominal pain, hematemesis, hematochezia, melena. Latest vitals are overall stable, no leukocytosis, Hgb, LFTs stable.       REVIEW OF SYSTEMS:   General: Negative  HEENT: Negative  CV: Negative  Respiratory: Negative  GI: See HPI  : Negative  MSK: Negative  Hematologic: Negative  Skin: Negative    MEDICATIONS:   MEDICATIONS  (STANDING):  chlorhexidine 2% Cloths 1 Application(s) Topical <User Schedule>  folic acid 1 milliGRAM(s) Oral daily  hydrocortisone hemorrhoidal Suppository 1 Suppository(s) Rectal at bedtime  HYDROmorphone PCA (1 mG/mL) 30 milliLiter(s) PCA Continuous PCA Continuous  memantine 10 milliGRAM(s) Oral two times a day  methadone    Tablet 15 milliGRAM(s) Oral at bedtime  methadone    Tablet 10 milliGRAM(s) Oral <User Schedule>  multivitamin 1 Tablet(s) Oral daily  naloxegol 25 milliGRAM(s) Oral daily  pantoprazole  Injectable 40 milliGRAM(s) IV Push daily  piperacillin/tazobactam IVPB.- 3.375 Gram(s) IV Intermittent once  piperacillin/tazobactam IVPB.. 3.375 Gram(s) IV Intermittent every 8 hours  polyethylene glycol 3350 17 Gram(s) Oral at bedtime  pregabalin 150 milliGRAM(s) Oral three times a day  sodium chloride 0.9% lock flush 3 milliLiter(s) IV Push every 8 hours    MEDICATIONS  (PRN):  acetaminophen     Tablet .. 650 milliGRAM(s) Oral every 6 hours PRN Temp greater or equal to 38C (100.4F), Mild Pain (1 - 3)  aluminum hydroxide/magnesium hydroxide/simethicone Suspension 30 milliLiter(s) Oral every 4 hours PRN Dyspepsia  glycerin Suppository - Adult 1 Suppository(s) Rectal at bedtime PRN Constipation  LORazepam     Tablet 0.5 milliGRAM(s) Oral two times a day PRN Anxiety  melatonin 3 milliGRAM(s) Oral at bedtime PRN Insomnia  naloxone Injectable 0.1 milliGRAM(s) IV Push every 3 minutes PRN For ANY of the following changes in patient status:  A. RR LESS THAN 10 breaths per minute, B. Oxygen saturation LESS THAN 90%, C. Sedation score of 6  ondansetron Injectable 4 milliGRAM(s) IV Push every 8 hours PRN Nausea and/or Vomiting      Packed Red Cells Order:  1 Unit  Indication: Anemia due to Active Hemorrhage - Medical Conditions e.  Infuse Unit : 3 Hours (01-02-24 @ 19:43)        DIET:  Diet, Regular (01-04-24 @ 12:43) [Active]          ALLERGIES:   Allergies    Perjeta (Other (Severe))  pertuzumab (Other (Severe))    Intolerances        VITAL SIGNS:   Vital Signs Last 24 Hrs  T(C): 36.9 (04 Jan 2024 10:39), Max: 36.9 (03 Jan 2024 14:15)  T(F): 98.4 (04 Jan 2024 10:39), Max: 98.5 (03 Jan 2024 14:15)  HR: 81 (04 Jan 2024 10:39) (72 - 96)  BP: 100/63 (04 Jan 2024 10:39) (96/62 - 114/78)  BP(mean): --  RR: 18 (04 Jan 2024 10:39) (18 - 18)  SpO2: 96% (04 Jan 2024 10:39) (96% - 100%)    Parameters below as of 04 Jan 2024 10:39  Patient On (Oxygen Delivery Method): room air      I&O's Summary      PHYSICAL EXAM:   GENERAL:  No acute distress  HEENT:  NC/AT, conjunctiva clear, sclera anicteric  CHEST:  No increased effort  HEART:  Regular rate  ABDOMEN:  Soft, non-tender, non-distended, normoactive bowel sounds, no rebound or guarding  EXTREMITIES: No edema  SKIN:  Warm, dry  NEURO:  Calm, cooperative    LABS:                        9.1    3.70  )-----------( 345      ( 04 Jan 2024 06:16 )             29.3     Hemoglobin: 9.1 g/dL (01-04-24 @ 06:16)  Hemoglobin: 8.2 g/dL (01-03-24 @ 07:28)  Hemoglobin: 7.5 g/dL (01-02-24 @ 18:00)    01-04    140  |  108  |  12.2  ----------------------------<  90  3.7   |  22.0  |  0.49<L>    Ca    8.5      04 Jan 2024 06:16  Phos  3.7     01-03  Mg     1.9     01-03    TPro  5.6<L>  /  Alb  3.4  /  TBili  0.9  /  DBili  x   /  AST  35<H>  /  ALT  16  /  AlkPhos  192<H>  01-02    LIVER FUNCTIONS - ( 02 Jan 2024 18:00 )  Alb: 3.4 g/dL / Pro: 5.6 g/dL / ALK PHOS: 192 U/L / ALT: 16 U/L / AST: 35 U/L / GGT: x             PT/INR - ( 02 Jan 2024 18:00 )   PT: 11.8 sec;   INR: 1.07 ratio         PTT - ( 02 Jan 2024 18:00 )  PTT:31.8 sec    RADIOLOGY & ADDITIONAL STUDIES:      ACC: [id number] EXAM:  CT ABDOMEN AND PELVIS IC   ORDERED BY: LEXX MICHAEL     PROCEDURE DATE:  01/03/2024          INTERPRETATION:  CLINICAL INFORMATION: Abdominal pain and anemia.    COMPARISON: CT abdomen pelvis 12/27/2023    CONTRAST/COMPLICATIONS:  IV Contrast: Omnipaque 350  80 cc administered   20 cc discarded  Oral Contrast: NONE  Complications: None reported at time of study completion    PROCEDURE:  CT of the Abdomen and Pelvis was performed.  Sagittal and coronal reformats were performed.    FINDINGS:  LOWER CHEST: Central venous catheter in the right atrium.    LIVER: Within normal limits.  BILE DUCTS: Normal caliber.  GALLBLADDER: Within normal limits.  SPLEEN: Splenomegaly.  PANCREAS: Within normal limits.  ADRENALS: Within normal limits.  KIDNEYS/URETERS: Within normal limits.    BLADDER: Within normal limits.  REPRODUCTIVE ORGANS: Uterus and adnexa within normal limits.    BOWEL: No bowel obstruction. Wall thickening of the ascending and   rectosigmoid colon which may be due to underdistention. Moderate stool in   the remainder of the colon.  PERITONEUM: Trace pelvic ascites.  VESSELS: Within normal limits.  RETROPERITONEUM/LYMPH NODES: No lymphadenopathy. No retroperitoneal   hematoma.  ABDOMINAL WALL: Within normal limits.  BONES: Redemonstrated mixed sclerotic and lytic osseous metastatic   disease. T12 and L1 vertebral cement.    IMPRESSION:  No retroperitoneal hematoma.    Wall thickening of the ascending and rectosigmoid colon which may be due   to underdistention. Moderate stool in the remainder of the colon.    Trace ascites.      --- End of Report ---    SHAWN KOROMA MD; Attending Radiologist  This document has been electronically signed. Cristhian  3 2024  8:51PM  01-03-24 @ 17:19       Chief Complaint:  Patient is a 38y old  Female who presents with a chief complaint of Symptomatic anemia (04 Jan 2024 12:46)      Interval HPI/ 24 hr events: Patient seen and examined at bedside. Patient reports feeling better today. Reports last bowel movement was yesterday. Denies any bloody bowel movements overnight. Denies nausea, vomiting, diarrhea, abdominal pain, hematemesis, hematochezia, melena. Latest vitals are overall stable, no leukocytosis, Hgb 9.1.      REVIEW OF SYSTEMS:   General: Negative  HEENT: Negative  CV: Negative  Respiratory: Negative  GI: See HPI  : Negative  MSK: Negative  Hematologic: Negative  Skin: Negative    MEDICATIONS:   MEDICATIONS  (STANDING):  chlorhexidine 2% Cloths 1 Application(s) Topical <User Schedule>  folic acid 1 milliGRAM(s) Oral daily  hydrocortisone hemorrhoidal Suppository 1 Suppository(s) Rectal at bedtime  HYDROmorphone PCA (1 mG/mL) 30 milliLiter(s) PCA Continuous PCA Continuous  memantine 10 milliGRAM(s) Oral two times a day  methadone    Tablet 15 milliGRAM(s) Oral at bedtime  methadone    Tablet 10 milliGRAM(s) Oral <User Schedule>  multivitamin 1 Tablet(s) Oral daily  naloxegol 25 milliGRAM(s) Oral daily  pantoprazole  Injectable 40 milliGRAM(s) IV Push daily  piperacillin/tazobactam IVPB.- 3.375 Gram(s) IV Intermittent once  piperacillin/tazobactam IVPB.. 3.375 Gram(s) IV Intermittent every 8 hours  polyethylene glycol 3350 17 Gram(s) Oral at bedtime  pregabalin 150 milliGRAM(s) Oral three times a day  sodium chloride 0.9% lock flush 3 milliLiter(s) IV Push every 8 hours    MEDICATIONS  (PRN):  acetaminophen     Tablet .. 650 milliGRAM(s) Oral every 6 hours PRN Temp greater or equal to 38C (100.4F), Mild Pain (1 - 3)  aluminum hydroxide/magnesium hydroxide/simethicone Suspension 30 milliLiter(s) Oral every 4 hours PRN Dyspepsia  glycerin Suppository - Adult 1 Suppository(s) Rectal at bedtime PRN Constipation  LORazepam     Tablet 0.5 milliGRAM(s) Oral two times a day PRN Anxiety  melatonin 3 milliGRAM(s) Oral at bedtime PRN Insomnia  naloxone Injectable 0.1 milliGRAM(s) IV Push every 3 minutes PRN For ANY of the following changes in patient status:  A. RR LESS THAN 10 breaths per minute, B. Oxygen saturation LESS THAN 90%, C. Sedation score of 6  ondansetron Injectable 4 milliGRAM(s) IV Push every 8 hours PRN Nausea and/or Vomiting      Packed Red Cells Order:  1 Unit  Indication: Anemia due to Active Hemorrhage - Medical Conditions e.  Infuse Unit : 3 Hours (01-02-24 @ 19:43)        DIET:  Diet, Regular (01-04-24 @ 12:43) [Active]          ALLERGIES:   Allergies    Perjeta (Other (Severe))  pertuzumab (Other (Severe))    Intolerances        VITAL SIGNS:   Vital Signs Last 24 Hrs  T(C): 36.9 (04 Jan 2024 10:39), Max: 36.9 (03 Jan 2024 14:15)  T(F): 98.4 (04 Jan 2024 10:39), Max: 98.5 (03 Jan 2024 14:15)  HR: 81 (04 Jan 2024 10:39) (72 - 96)  BP: 100/63 (04 Jan 2024 10:39) (96/62 - 114/78)  BP(mean): --  RR: 18 (04 Jan 2024 10:39) (18 - 18)  SpO2: 96% (04 Jan 2024 10:39) (96% - 100%)    Parameters below as of 04 Jan 2024 10:39  Patient On (Oxygen Delivery Method): room air      I&O's Summary      PHYSICAL EXAM:   GENERAL:  No acute distress  HEENT:  NC/AT, conjunctiva clear, sclera anicteric  CHEST:  No increased effort  HEART:  Regular rate  ABDOMEN:  Soft, non-tender, non-distended, normoactive bowel sounds, no rebound or guarding  EXTREMITIES: No edema  SKIN:  Warm, dry  NEURO:  Calm, cooperative    LABS:                        9.1    3.70  )-----------( 345      ( 04 Jan 2024 06:16 )             29.3     Hemoglobin: 9.1 g/dL (01-04-24 @ 06:16)  Hemoglobin: 8.2 g/dL (01-03-24 @ 07:28)  Hemoglobin: 7.5 g/dL (01-02-24 @ 18:00)    01-04    140  |  108  |  12.2  ----------------------------<  90  3.7   |  22.0  |  0.49<L>    Ca    8.5      04 Jan 2024 06:16  Phos  3.7     01-03  Mg     1.9     01-03    TPro  5.6<L>  /  Alb  3.4  /  TBili  0.9  /  DBili  x   /  AST  35<H>  /  ALT  16  /  AlkPhos  192<H>  01-02    LIVER FUNCTIONS - ( 02 Jan 2024 18:00 )  Alb: 3.4 g/dL / Pro: 5.6 g/dL / ALK PHOS: 192 U/L / ALT: 16 U/L / AST: 35 U/L / GGT: x             PT/INR - ( 02 Jan 2024 18:00 )   PT: 11.8 sec;   INR: 1.07 ratio         PTT - ( 02 Jan 2024 18:00 )  PTT:31.8 sec    RADIOLOGY & ADDITIONAL STUDIES:      ACC: [id number] EXAM:  CT ABDOMEN AND PELVIS IC   ORDERED BY: LEXX MICHAEL     PROCEDURE DATE:  01/03/2024          INTERPRETATION:  CLINICAL INFORMATION: Abdominal pain and anemia.    COMPARISON: CT abdomen pelvis 12/27/2023    CONTRAST/COMPLICATIONS:  IV Contrast: Omnipaque 350  80 cc administered   20 cc discarded  Oral Contrast: NONE  Complications: None reported at time of study completion    PROCEDURE:  CT of the Abdomen and Pelvis was performed.  Sagittal and coronal reformats were performed.    FINDINGS:  LOWER CHEST: Central venous catheter in the right atrium.    LIVER: Within normal limits.  BILE DUCTS: Normal caliber.  GALLBLADDER: Within normal limits.  SPLEEN: Splenomegaly.  PANCREAS: Within normal limits.  ADRENALS: Within normal limits.  KIDNEYS/URETERS: Within normal limits.    BLADDER: Within normal limits.  REPRODUCTIVE ORGANS: Uterus and adnexa within normal limits.    BOWEL: No bowel obstruction. Wall thickening of the ascending and   rectosigmoid colon which may be due to underdistention. Moderate stool in   the remainder of the colon.  PERITONEUM: Trace pelvic ascites.  VESSELS: Within normal limits.  RETROPERITONEUM/LYMPH NODES: No lymphadenopathy. No retroperitoneal   hematoma.  ABDOMINAL WALL: Within normal limits.  BONES: Redemonstrated mixed sclerotic and lytic osseous metastatic   disease. T12 and L1 vertebral cement.    IMPRESSION:  No retroperitoneal hematoma.    Wall thickening of the ascending and rectosigmoid colon which may be due   to underdistention. Moderate stool in the remainder of the colon.    Trace ascites.      --- End of Report ---    SHAWN KOROMA MD; Attending Radiologist  This document has been electronically signed. Cristhian  3 2024  8:51PM  01-03-24 @ 17:19       Chief Complaint:  Patient is a 38y old  Female who presents with a chief complaint of Symptomatic anemia (04 Jan 2024 12:46)      Interval HPI/ 24 hr events: Patient seen and examined at bedside. Patient reports feeling better today. Reports last bowel movement was yesterday. Denies any bloody bowel movements overnight. Denies nausea, vomiting, diarrhea, abdominal pain, hematemesis, hematochezia, melena. Latest vitals are overall stable, no leukocytosis, Hgb 9.1.      REVIEW OF SYSTEMS:   General: Negative  HEENT: Negative  CV: Negative  Respiratory: Negative  GI: See HPI  : Negative  MSK: Negative  Hematologic: Negative  Skin: Negative    MEDICATIONS:   MEDICATIONS  (STANDING):  chlorhexidine 2% Cloths 1 Application(s) Topical <User Schedule>  folic acid 1 milliGRAM(s) Oral daily  hydrocortisone hemorrhoidal Suppository 1 Suppository(s) Rectal at bedtime  HYDROmorphone PCA (1 mG/mL) 30 milliLiter(s) PCA Continuous PCA Continuous  memantine 10 milliGRAM(s) Oral two times a day  methadone    Tablet 15 milliGRAM(s) Oral at bedtime  methadone    Tablet 10 milliGRAM(s) Oral <User Schedule>  multivitamin 1 Tablet(s) Oral daily  naloxegol 25 milliGRAM(s) Oral daily  pantoprazole  Injectable 40 milliGRAM(s) IV Push daily  piperacillin/tazobactam IVPB.- 3.375 Gram(s) IV Intermittent once  piperacillin/tazobactam IVPB.. 3.375 Gram(s) IV Intermittent every 8 hours  polyethylene glycol 3350 17 Gram(s) Oral at bedtime  pregabalin 150 milliGRAM(s) Oral three times a day  sodium chloride 0.9% lock flush 3 milliLiter(s) IV Push every 8 hours    MEDICATIONS  (PRN):  acetaminophen     Tablet .. 650 milliGRAM(s) Oral every 6 hours PRN Temp greater or equal to 38C (100.4F), Mild Pain (1 - 3)  aluminum hydroxide/magnesium hydroxide/simethicone Suspension 30 milliLiter(s) Oral every 4 hours PRN Dyspepsia  glycerin Suppository - Adult 1 Suppository(s) Rectal at bedtime PRN Constipation  LORazepam     Tablet 0.5 milliGRAM(s) Oral two times a day PRN Anxiety  melatonin 3 milliGRAM(s) Oral at bedtime PRN Insomnia  naloxone Injectable 0.1 milliGRAM(s) IV Push every 3 minutes PRN For ANY of the following changes in patient status:  A. RR LESS THAN 10 breaths per minute, B. Oxygen saturation LESS THAN 90%, C. Sedation score of 6  ondansetron Injectable 4 milliGRAM(s) IV Push every 8 hours PRN Nausea and/or Vomiting      Packed Red Cells Order:  1 Unit  Indication: Anemia due to Active Hemorrhage - Medical Conditions e.  Infuse Unit : 3 Hours (01-02-24 @ 19:43)        DIET:  Diet, Regular (01-04-24 @ 12:43) [Active]          ALLERGIES:   Allergies    Perjeta (Other (Severe))  pertuzumab (Other (Severe))    Intolerances        VITAL SIGNS:   Vital Signs Last 24 Hrs  T(C): 36.9 (04 Jan 2024 10:39), Max: 36.9 (03 Jan 2024 14:15)  T(F): 98.4 (04 Jan 2024 10:39), Max: 98.5 (03 Jan 2024 14:15)  HR: 81 (04 Jan 2024 10:39) (72 - 96)  BP: 100/63 (04 Jan 2024 10:39) (96/62 - 114/78)  BP(mean): --  RR: 18 (04 Jan 2024 10:39) (18 - 18)  SpO2: 96% (04 Jan 2024 10:39) (96% - 100%)    Parameters below as of 04 Jan 2024 10:39  Patient On (Oxygen Delivery Method): room air      I&O's Summary      PHYSICAL EXAM:   GENERAL:  No acute distress  HEENT:  NC/AT, conjunctiva clear, sclera anicteric  CHEST:  No increased effort  HEART:  Regular rate  ABDOMEN:  Soft, non-tender, non-distended, normoactive bowel sounds, no rebound or guarding  EXTREMITIES: No edema  SKIN:  Warm, dry  NEURO:  Calm, cooperative    LABS:                        9.1    3.70  )-----------( 345      ( 04 Jan 2024 06:16 )             29.3     Hemoglobin: 9.1 g/dL (01-04-24 @ 06:16)  Hemoglobin: 8.2 g/dL (01-03-24 @ 07:28)  Hemoglobin: 7.5 g/dL (01-02-24 @ 18:00)    01-04    140  |  108  |  12.2  ----------------------------<  90  3.7   |  22.0  |  0.49<L>    Ca    8.5      04 Jan 2024 06:16  Phos  3.7     01-03  Mg     1.9     01-03    TPro  5.6<L>  /  Alb  3.4  /  TBili  0.9  /  DBili  x   /  AST  35<H>  /  ALT  16  /  AlkPhos  192<H>  01-02    LIVER FUNCTIONS - ( 02 Jan 2024 18:00 )  Alb: 3.4 g/dL / Pro: 5.6 g/dL / ALK PHOS: 192 U/L / ALT: 16 U/L / AST: 35 U/L / GGT: x             PT/INR - ( 02 Jan 2024 18:00 )   PT: 11.8 sec;   INR: 1.07 ratio         PTT - ( 02 Jan 2024 18:00 )  PTT:31.8 sec    RADIOLOGY & ADDITIONAL STUDIES:      ACC: [id number] EXAM:  CT ABDOMEN AND PELVIS IC   ORDERED BY: LEXX MICHAEL     PROCEDURE DATE:  01/03/2024          INTERPRETATION:  CLINICAL INFORMATION: Abdominal pain and anemia.    COMPARISON: CT abdomen pelvis 12/27/2023    CONTRAST/COMPLICATIONS:  IV Contrast: Omnipaque 350  80 cc administered   20 cc discarded  Oral Contrast: NONE  Complications: None reported at time of study completion    PROCEDURE:  CT of the Abdomen and Pelvis was performed.  Sagittal and coronal reformats were performed.    FINDINGS:  LOWER CHEST: Central venous catheter in the right atrium.    LIVER: Within normal limits.  BILE DUCTS: Normal caliber.  GALLBLADDER: Within normal limits.  SPLEEN: Splenomegaly.  PANCREAS: Within normal limits.  ADRENALS: Within normal limits.  KIDNEYS/URETERS: Within normal limits.    BLADDER: Within normal limits.  REPRODUCTIVE ORGANS: Uterus and adnexa within normal limits.    BOWEL: No bowel obstruction. Wall thickening of the ascending and   rectosigmoid colon which may be due to underdistention. Moderate stool in   the remainder of the colon.  PERITONEUM: Trace pelvic ascites.  VESSELS: Within normal limits.  RETROPERITONEUM/LYMPH NODES: No lymphadenopathy. No retroperitoneal   hematoma.  ABDOMINAL WALL: Within normal limits.  BONES: Redemonstrated mixed sclerotic and lytic osseous metastatic   disease. T12 and L1 vertebral cement.    IMPRESSION:  No retroperitoneal hematoma.    Wall thickening of the ascending and rectosigmoid colon which may be due   to underdistention. Moderate stool in the remainder of the colon.    Trace ascites.      --- End of Report ---    SHAWN KOROMA MD; Attending Radiologist  This document has been electronically signed. Cristhian  3 2024  8:51PM  01-03-24 @ 17:19

## 2024-01-04 NOTE — PROGRESS NOTE ADULT - ASSESSMENT
39 yo F known patient of Dr Chapa at [facility], metastatic breast ca on Enhertu, GIB who was admitted to the hospital due to severe anemia and GIB.     1) Metastatic Breast ca  as above  on Enhertu. Last dose on 12/20/2023.  PET CT showed stable disease.  f/u with Dr Chapa upon DC    2) Anemia  hgb 8.2 today, s/p prbc transfusion.  GI on board  IV abx  cultures  transfuse if hgb<7.0 or if actively bleeding.    3) Leukopenia  recent chemo  keep ANC > 1000  wbc 3.34k.  No fever.    4) DVT ppx      will follow 37 yo F known patient of Dr Chapa at [facility], metastatic breast ca on Enhertu, GIB who was admitted to the hospital due to severe anemia and GIB.     1) Metastatic Breast ca  as above  on Enhertu. Last dose on 12/20/2023.  PET CT showed stable disease.  f/u with Dr Chapa upon DC    2) Anemia  hgb 8.2 today, s/p prbc transfusion.  GI on board  IV abx  cultures  transfuse if hgb<7.0 or if actively bleeding.    3) Leukopenia  recent chemo  keep ANC > 1000  wbc 3.34k.  No fever.    4) DVT ppx      will follow 37 yo F known patient of Dr Chapa at [facility], metastatic breast ca on Enhertu, GIB who was admitted to the hospital due to severe anemia and GIB.     1) Metastatic Breast ca  as above  on Enhertu. Last dose on 12/20/2023.  PET CT showed stable disease.  f/u with Dr Chapa upon DC    2) Anemia  hgb 9.1 today, s/p prbc transfusion.  GI on board  IV abx  cultures  transfuse if hgb<7.0 or if actively bleeding.  CTE today     3) Leukopenia  recent chemo  keep ANC > 1000  wbc 3.7k  No fever.    4) DVT ppx      will follow 39 yo F known patient of Dr Chapa at [facility], metastatic breast ca on Enhertu, GIB who was admitted to the hospital due to severe anemia and GIB.     1) Metastatic Breast ca  as above  on Enhertu. Last dose on 12/20/2023.  PET CT showed stable disease.  f/u with Dr Chapa upon DC    2) Anemia  hgb 9.1 today, s/p prbc transfusion.  GI on board  IV abx  cultures  transfuse if hgb<7.0 or if actively bleeding.  CTE today     3) Leukopenia  recent chemo  keep ANC > 1000  wbc 3.7k  No fever.    4) DVT ppx      will follow

## 2024-01-04 NOTE — DISCHARGE NOTE PROVIDER - NSDCMRMEDTOKEN_GEN_ALL_CORE_FT
Anusol-HC 25 mg rectal suppository: 1 suppository(ies) rectal once a day (at bedtime)  Ativan 0.5 mg oral tablet: 1 tab(s) orally 2 times a day as needed for  anxiety  Dilaudid 4 mg oral tablet: 1 tab(s) orally every 8 hours as needed for  severe pain  Lyrica 150 mg oral capsule: 1 cap(s) orally 3 times a day hold if lethargic and do not take it with narcotics at the same time  methadone 10 mg oral tablet: 1 tab(s) orally 2 times a day  methadone 5 mg oral tablet: 3 tab(s) orally once a day (at bedtime)  Namenda 10 mg oral tablet: 1 tab(s) orally 2 times a day  Protonix 40 mg oral delayed release tablet: 1 tab(s) orally once a day (before a meal)   Anusol-HC 25 mg rectal suppository: 1 suppository(ies) rectal once a day (at bedtime)  Ativan 0.5 mg oral tablet: 1 tab(s) orally 2 times a day as needed for  anxiety  Dilaudid 4 mg oral tablet: 1 tab(s) orally every 8 hours as needed for  severe pain  FLUoxetine 40 mg oral capsule: 2 cap(s) orally once a day (at bedtime)  folic acid 1 mg oral tablet: 1 tab(s) orally once a day  Lyrica 150 mg oral capsule: 1 cap(s) orally 3 times a day hold if lethargic and do not take it with narcotics at the same time  methadone 10 mg oral tablet: 2 tab(s) orally once a day (at bedtime)  methadone 10 mg oral tablet: 1 tab(s) orally once a day  Multiple Vitamins oral tablet: 1 tab(s) orally once a day  Namenda 10 mg oral tablet: 1 tab(s) orally 2 times a day  Protonix 40 mg oral delayed release tablet: 1 tab(s) orally 2 times a day   Anusol-HC 25 mg rectal suppository: 1 suppository(ies) rectal once a day (at bedtime)  Ativan 0.5 mg oral tablet: 1 tab(s) orally 2 times a day as needed for  anxiety  Dilaudid 4 mg oral tablet: 1 tab(s) orally every 8 hours as needed for  severe pain  FLUoxetine 40 mg oral capsule: 2 cap(s) orally once a day (at bedtime)  folic acid 1 mg oral tablet: 1 tab(s) orally once a day  Lyrica 150 mg oral capsule: 1 cap(s) orally 3 times a day hold if lethargic and do not take it with narcotics at the same time  methadone 10 mg oral tablet: 2 tab(s) orally once a day (at bedtime)  methadone 10 mg oral tablet: 1 tab(s) orally once a day  Multiple Vitamins oral tablet: 1 tab(s) orally once a day  Namenda 10 mg oral tablet: 1 tab(s) orally 2 times a day  polyethylene glycol 3350 oral powder for reconstitution: 17 gram(s) orally once a day (at bedtime)  Protonix 40 mg oral delayed release tablet: 1 tab(s) orally 2 times a day

## 2024-01-04 NOTE — PROGRESS NOTE ADULT - ASSESSMENT
37 y/o female with hx of Stage-4 Breast cancer with diffuse osseous mets, on home methadone for chronic pain  uncontrolled pain on iv dilaudid    dPCA 0/0.3/8/6  cont methadone 10/10/15    pain controlled today  utilizing ~2mg/4hr on pca  will transition to PO opioid likely tomorrow 39 y/o female with hx of Stage-4 Breast cancer with diffuse osseous mets, on home methadone for chronic pain  uncontrolled pain on iv dilaudid    dPCA 0/0.3/8/6  cont methadone 10/10/15    pain controlled today  utilizing ~2mg/4hr on pca  will transition to PO opioid likely tomorrow

## 2024-01-04 NOTE — DISCHARGE NOTE PROVIDER - PROVIDER TOKENS
PROVIDER:[TOKEN:[[id number]],FOLLOWUP:[1 week]] PROVIDER:[TOKEN:[[id number]],FOLLOWUP:[1 week]] PROVIDER:[TOKEN:[[id number]],FOLLOWUP:[1 week]],FREE:[LAST:[PCP],PHONE:[(   )    -],FAX:[(   )    -]] PROVIDER:[TOKEN:[[id number]],FOLLOWUP:[1 week]],FREE:[LAST:[PCP],PHONE:[(   )    -],FAX:[(   )    -]]

## 2024-01-04 NOTE — PROGRESS NOTE ADULT - ASSESSMENT
37 y/o female with hx of Stage lV Breast cancer with diffuse osseous mets, prior malignant pericardial effusion, pleural effusion s/p drain, s/p XRT/Chemo last chemo with Enhertu with plan to switch to Keytruda on 11/29 but has been on hold due to recurrent symptomatic anemia requiring multiple transfusions.    GI consulted for  37 y/o female with hx of Stage lV Breast cancer with diffuse osseous mets, prior malignant pericardial effusion, pleural effusion s/p drain, s/p XRT/Chemo last chemo with Enhertu with plan to switch to Keytruda on 11/29 but has been on hold due to recurrent symptomatic anemia requiring multiple transfusions.    GI consulted for symptomatic anemia/dark stool    -Prior endoscopic evaluation:   VCE on 12/11/23 revealed enteritis  Colonoscopy on 11/20 showed internal hemorrhoids  EGD/ colonoscopy (10/2023) at [place] showed antral erythema, colonoscopy poor prep     #symptomatic anemia  - Monitor CBC, transfuse as needed. Monitor for signs of bleeding  - Avoid NSAIDs  - Continue Protonix         #chronic constipation  - Serial abdominal exam  - Bowel regimen while on opioids  - Encourage ambulation as tolerated     37 y/o female with hx of Stage lV Breast cancer with diffuse osseous mets, prior malignant pericardial effusion, pleural effusion s/p drain, s/p XRT/Chemo last chemo with Enhertu with plan to switch to Keytruda on 11/29 but has been on hold due to recurrent symptomatic anemia requiring multiple transfusions.    GI consulted for symptomatic anemia/dark stool    -Prior endoscopic evaluation:   VCE on 12/11/23 revealed enteritis  Colonoscopy on 11/20 showed internal hemorrhoids  EGD/ colonoscopy (10/2023) at [place] showed antral erythema, colonoscopy poor prep     #symptomatic anemia  - Monitor CBC, transfuse as needed. Monitor for signs of bleeding  - Avoid NSAIDs  - Continue Protonix         #chronic constipation  - Serial abdominal exam  - Bowel regimen while on opioids  - Encourage ambulation as tolerated     39 y/o female with hx of Stage lV Breast cancer with diffuse osseous mets, prior malignant pericardial effusion, pleural effusion s/p drain, s/p XRT/Chemo last chemo with Enhertu with plan to switch to Keytruda on 11/29 but has been on hold due to recurrent symptomatic anemia requiring multiple transfusions.    GI consulted for symptomatic anemia/dark stool    -Prior endoscopic evaluation:   VCE on 12/11/23 revealed enteritis  Colonoscopy on 11/20 showed internal hemorrhoids  EGD/ colonoscopy (10/2023) at [place] showed antral erythema, colonoscopy poor prep     #symptomatic anemia  - Monitor CBC, transfuse as needed. Monitor for signs of bleeding  - Avoid NSAIDs  - Continue Pantoprazole  - CT enterography performed, report pending    #chronic constipation  - Serial abdominal exams  - Continue bowel regimen while on opioids  - Encourage ambulation as tolerated  - Anusol suppository for internal hemorrhoids  _________________________________________________________________  Assessment and recommendations are final when note is signed by the attending physician.    39 y/o female with hx of Stage lV Breast cancer with diffuse osseous mets, prior malignant pericardial effusion, pleural effusion s/p drain, s/p XRT/Chemo last chemo with Enhertu with plan to switch to Keytruda on 11/29 but has been on hold due to recurrent symptomatic anemia requiring multiple transfusions.    GI consulted for symptomatic anemia/dark stool    -Prior endoscopic evaluation:   VCE on 12/11/23 revealed enteritis  Colonoscopy on 11/20 showed internal hemorrhoids  EGD/ colonoscopy (10/2023) at [place] showed antral erythema, colonoscopy poor prep     #symptomatic anemia  - Monitor CBC, transfuse as needed. Monitor for signs of bleeding  - Avoid NSAIDs  - Continue Pantoprazole  - CT enterography performed, report pending    #chronic constipation  - Serial abdominal exams  - Continue bowel regimen while on opioids  - Encourage ambulation as tolerated  - Anusol suppository for internal hemorrhoids  _________________________________________________________________  Assessment and recommendations are final when note is signed by the attending physician.

## 2024-01-04 NOTE — PROGRESS NOTE ADULT - SUBJECTIVE AND OBJECTIVE BOX
CC: Follow up     INTERVAL HPI/OVERNIGHT EVENTS: Patient seen and examined, 3 Bm yesterday. Generalized abdominal pain. Denies nausea or vomiting. Afebrile.     Vital Signs Last 24 Hrs  T(C): 36.9 (04 Jan 2024 10:39), Max: 36.9 (04 Jan 2024 10:39)  T(F): 98.4 (04 Jan 2024 10:39), Max: 98.4 (04 Jan 2024 10:39)  HR: 81 (04 Jan 2024 10:39) (72 - 84)  BP: 100/63 (04 Jan 2024 10:39) (96/62 - 111/74)  BP(mean): --  RR: 18 (04 Jan 2024 10:39) (18 - 18)  SpO2: 96% (04 Jan 2024 10:39) (96% - 100%)    Parameters below as of 04 Jan 2024 10:39  Patient On (Oxygen Delivery Method): room air        PHYSICAL EXAM:    GENERAL: NAD, AOX3  HEAD:  Atraumatic, Normocephalic  EYES: conjunctiva and sclera clear  ENMT: Moist mucous membranes  NECK: Supple  CHEST/LUNG: Clear to auscultation bilaterally; No rales, rhonchi, wheezing, or rubs  HEART: Regular rate and rhythm; No murmurs, rubs, or gallops  ABDOMEN: Soft, Generalized tenderness, Nondistended; Bowel sounds present  EXTREMITIES:  2+ Peripheral Pulses, No clubbing, cyanosis, or edema        MEDICATIONS  (STANDING):  chlorhexidine 2% Cloths 1 Application(s) Topical <User Schedule>  folic acid 1 milliGRAM(s) Oral daily  hydrocortisone hemorrhoidal Suppository 1 Suppository(s) Rectal at bedtime  HYDROmorphone PCA (1 mG/mL) 30 milliLiter(s) PCA Continuous PCA Continuous  memantine 10 milliGRAM(s) Oral two times a day  methadone    Tablet 15 milliGRAM(s) Oral at bedtime  methadone    Tablet 10 milliGRAM(s) Oral <User Schedule>  multivitamin 1 Tablet(s) Oral daily  naloxegol 25 milliGRAM(s) Oral daily  pantoprazole  Injectable 40 milliGRAM(s) IV Push daily  piperacillin/tazobactam IVPB.- 3.375 Gram(s) IV Intermittent once  piperacillin/tazobactam IVPB.. 3.375 Gram(s) IV Intermittent every 8 hours  polyethylene glycol 3350 17 Gram(s) Oral at bedtime  pregabalin 150 milliGRAM(s) Oral three times a day  sodium chloride 0.9% lock flush 3 milliLiter(s) IV Push every 8 hours    MEDICATIONS  (PRN):  acetaminophen     Tablet .. 650 milliGRAM(s) Oral every 6 hours PRN Temp greater or equal to 38C (100.4F), Mild Pain (1 - 3)  aluminum hydroxide/magnesium hydroxide/simethicone Suspension 30 milliLiter(s) Oral every 4 hours PRN Dyspepsia  glycerin Suppository - Adult 1 Suppository(s) Rectal at bedtime PRN Constipation  LORazepam     Tablet 0.5 milliGRAM(s) Oral two times a day PRN Anxiety  melatonin 3 milliGRAM(s) Oral at bedtime PRN Insomnia  naloxone Injectable 0.1 milliGRAM(s) IV Push every 3 minutes PRN For ANY of the following changes in patient status:  A. RR LESS THAN 10 breaths per minute, B. Oxygen saturation LESS THAN 90%, C. Sedation score of 6  ondansetron Injectable 4 milliGRAM(s) IV Push every 8 hours PRN Nausea and/or Vomiting      Allergies    Perjeta (Other (Severe))  pertuzumab (Other (Severe))    Intolerances          LABS:                          9.1    3.70  )-----------( 345      ( 04 Jan 2024 06:16 )             29.3     01-04    140  |  108  |  12.2  ----------------------------<  90  3.7   |  22.0  |  0.49<L>    Ca    8.5      04 Jan 2024 06:16  Phos  3.7     01-03  Mg     1.9     01-03    TPro  5.6<L>  /  Alb  3.4  /  TBili  0.9  /  DBili  x   /  AST  35<H>  /  ALT  16  /  AlkPhos  192<H>  01-02    PT/INR - ( 02 Jan 2024 18:00 )   PT: 11.8 sec;   INR: 1.07 ratio         PTT - ( 02 Jan 2024 18:00 )  PTT:31.8 sec  Urinalysis Basic - ( 04 Jan 2024 06:16 )    Color: x / Appearance: x / SG: x / pH: x  Gluc: 90 mg/dL / Ketone: x  / Bili: x / Urobili: x   Blood: x / Protein: x / Nitrite: x   Leuk Esterase: x / RBC: x / WBC x   Sq Epi: x / Non Sq Epi: x / Bacteria: x        RADIOLOGY & ADDITIONAL TESTS:

## 2024-01-04 NOTE — DISCHARGE NOTE PROVIDER - CARE PROVIDER_API CALL
Angie Chapa  Medical Oncology  [address] [place] [id number]  Phone: [phone]  Fax: [phone]  Follow Up Time: 1 week   Angie Chapa  Medical Oncology  [address] [place] [id number]  Phone: [phone]  Fax: [phone]  Follow Up Time: 1 week   Angie Chapa  Medical Oncology  [address] [place] [id number]  Phone: [phone]  Fax: [phone]  Follow Up Time: 1 week    PCP,   Phone: (   )    -  Fax: (   )    -  Follow Up Time:    Angie Chapa  Medical Oncology  [address] [place] [id number]  Phone: [phone]  Fax: [phone]  Follow Up Time: 1 week    PCP,   Phone: (   )    -  Fax: (   )    -  Follow Up Time:

## 2024-01-04 NOTE — DISCHARGE NOTE PROVIDER - DID THE PATIENT PRESENT WITH OR WAS TREATED FOR MALNUTRITION DURING THIS ADMISSION
You may now shower and get your incisions wet. We recommend starting scar massage in 1 week to your incision(s). Take Vitamin E, Cocoa Butter or Scar Cream and massage the incision 2 times a day. This will help soften your incisions and help de-sensitize the skin as the nerves \"wake up\". You are to wear your sling whenever you are up and about, being active, or outside of your home. You may remove your sling when you are sedentary. You may remove your abduction pillow if this is more comfortable. We recommend that you try sleeping in your sling at night. However, if this is extremely intolerable you may remove your sling but prop your arm with some pillows. Remember, you are not to move your arm on your own. Only \"good arm helping the bad arm\". This includes \"chicken winging \" your arm out to the side. You may move your elbow and your wrist for activities such as typing, reading a book, etc.  Please remember that nothing including physical therapy should cause an increase in pain or soreness. No

## 2024-01-04 NOTE — PROGRESS NOTE ADULT - SUBJECTIVE AND OBJECTIVE BOX
39 yo F known patient of Dr Chapa at [facility], metastatic breast ca on Enhertu and with prior GIB requiring blood transfusion is admitted to the hospital again due to symptomatic anemia.  Her hemoglobin was found to be 7.5.  She received 1 unit of PRBC and hemoglobin is 8.2 now.  She also has chronic cancer-related pain noted in her back.  She says that in the emergency room she received Dilaudid 2 mg IV which seem to help but says that as inpatient she received a dose of Dilaudid 1 mg which was not enough and would like to request a higher dose.  She says that last night her bowel movement was very dark and she feels it was likely mixed with blood.   She had a PET-CT and brain MRI done within the past few days which overall showed stable disease. Colitis noted on CT. She has been started on IV antibiotics.         ROS  as per HPI    PAST MEDICAL & SURGICAL HISTORY:  H/O compression fracture of spine      Anxiety      Metastatic breast cancer      H/O pleural effusion      Pericardial effusion      S/P tonsillectomy      H/O chest tube placement  12/23/21      S/P pericardiocentesis  12/28/21      Social History:    FAMILY HISTORY:  FH: CVA (cerebrovascular accident)        MEDICATIONS  (STANDING):  chlorhexidine 2% Cloths 1 Application(s) Topical <User Schedule>  folic acid 1 milliGRAM(s) Oral daily  hydrocortisone hemorrhoidal Suppository 1 Suppository(s) Rectal at bedtime  HYDROmorphone PCA (1 mG/mL) 30 milliLiter(s) PCA Continuous PCA Continuous  memantine 10 milliGRAM(s) Oral two times a day  methadone    Tablet 10 milliGRAM(s) Oral <User Schedule>  methadone    Tablet 15 milliGRAM(s) Oral at bedtime  multivitamin 1 Tablet(s) Oral daily  naloxegol 25 milliGRAM(s) Oral daily  pantoprazole  Injectable 40 milliGRAM(s) IV Push daily  piperacillin/tazobactam IVPB.- 3.375 Gram(s) IV Intermittent once  piperacillin/tazobactam IVPB.. 3.375 Gram(s) IV Intermittent every 8 hours  polyethylene glycol 3350 17 Gram(s) Oral at bedtime  pregabalin 150 milliGRAM(s) Oral three times a day  sodium chloride 0.9% lock flush 3 milliLiter(s) IV Push every 8 hours    MEDICATIONS  (PRN):  acetaminophen     Tablet .. 650 milliGRAM(s) Oral every 6 hours PRN Temp greater or equal to 38C (100.4F), Mild Pain (1 - 3)  aluminum hydroxide/magnesium hydroxide/simethicone Suspension 30 milliLiter(s) Oral every 4 hours PRN Dyspepsia  glycerin Suppository - Adult 1 Suppository(s) Rectal at bedtime PRN Constipation  LORazepam     Tablet 0.5 milliGRAM(s) Oral two times a day PRN Anxiety  melatonin 3 milliGRAM(s) Oral at bedtime PRN Insomnia  naloxone Injectable 0.1 milliGRAM(s) IV Push every 3 minutes PRN For ANY of the following changes in patient status:  A. RR LESS THAN 10 breaths per minute, B. Oxygen saturation LESS THAN 90%, C. Sedation score of 6  ondansetron Injectable 4 milliGRAM(s) IV Push every 8 hours PRN Nausea and/or Vomiting      Vital Signs Last 24 Hrs  T(C): 36.5 (01-04-24 @ 04:37), Max: 36.9 (01-03-24 @ 14:15)  T(F): 97.7 (01-04-24 @ 04:37), Max: 98.5 (01-03-24 @ 14:15)  HR: 79 (01-04-24 @ 04:37) (72 - 96)  BP: 96/62 (01-04-24 @ 04:37) (96/62 - 114/78)  BP(mean): --  RR: 18 (01-04-24 @ 04:37) (18 - 18)  SpO2: 96% (01-04-24 @ 04:37) (96% - 100%)      Gen - alert and oriented  Heart - s1s2 RRR  Lung - CTA b/l  Abd - mild tender  Ext no edema               Labs:                          9.1    3.70  )-----------( 345      ( 04 Jan 2024 06:16 )             29.3                           8.2    3.34  )-----------( 250      ( 03 Jan 2024 07:28 )             26.8     01-03    142  |  110<H>  |  14.1  ----------------------------<  92  3.5   |  23.0  |  0.48<L>    Ca    8.2<L>      03 Jan 2024 07:28  Phos  3.7     01-03  Mg     1.9     01-03    TPro  5.6<L>  /  Alb  3.4  /  TBili  0.9  /  DBili  x   /  AST  35<H>  /  ALT  16  /  AlkPhos  192<H>  01-02   37 yo F known patient of Dr Chapa at [facility], metastatic breast ca on Enhertu and with prior GIB requiring blood transfusion is admitted to the hospital again due to symptomatic anemia.  Her hemoglobin was found to be 7.5.  She received 1 unit of PRBC and hemoglobin is 8.2 now.  She also has chronic cancer-related pain noted in her back.  She says that in the emergency room she received Dilaudid 2 mg IV which seem to help but says that as inpatient she received a dose of Dilaudid 1 mg which was not enough and would like to request a higher dose.  She says that last night her bowel movement was very dark and she feels it was likely mixed with blood.   She had a PET-CT and brain MRI done within the past few days which overall showed stable disease. Colitis noted on CT. She has been started on IV antibiotics.         ROS  as per HPI    PAST MEDICAL & SURGICAL HISTORY:  H/O compression fracture of spine      Anxiety      Metastatic breast cancer      H/O pleural effusion      Pericardial effusion      S/P tonsillectomy      H/O chest tube placement  12/23/21      S/P pericardiocentesis  12/28/21      Social History:    FAMILY HISTORY:  FH: CVA (cerebrovascular accident)        MEDICATIONS  (STANDING):  chlorhexidine 2% Cloths 1 Application(s) Topical <User Schedule>  folic acid 1 milliGRAM(s) Oral daily  hydrocortisone hemorrhoidal Suppository 1 Suppository(s) Rectal at bedtime  HYDROmorphone PCA (1 mG/mL) 30 milliLiter(s) PCA Continuous PCA Continuous  memantine 10 milliGRAM(s) Oral two times a day  methadone    Tablet 10 milliGRAM(s) Oral <User Schedule>  methadone    Tablet 15 milliGRAM(s) Oral at bedtime  multivitamin 1 Tablet(s) Oral daily  naloxegol 25 milliGRAM(s) Oral daily  pantoprazole  Injectable 40 milliGRAM(s) IV Push daily  piperacillin/tazobactam IVPB.- 3.375 Gram(s) IV Intermittent once  piperacillin/tazobactam IVPB.. 3.375 Gram(s) IV Intermittent every 8 hours  polyethylene glycol 3350 17 Gram(s) Oral at bedtime  pregabalin 150 milliGRAM(s) Oral three times a day  sodium chloride 0.9% lock flush 3 milliLiter(s) IV Push every 8 hours    MEDICATIONS  (PRN):  acetaminophen     Tablet .. 650 milliGRAM(s) Oral every 6 hours PRN Temp greater or equal to 38C (100.4F), Mild Pain (1 - 3)  aluminum hydroxide/magnesium hydroxide/simethicone Suspension 30 milliLiter(s) Oral every 4 hours PRN Dyspepsia  glycerin Suppository - Adult 1 Suppository(s) Rectal at bedtime PRN Constipation  LORazepam     Tablet 0.5 milliGRAM(s) Oral two times a day PRN Anxiety  melatonin 3 milliGRAM(s) Oral at bedtime PRN Insomnia  naloxone Injectable 0.1 milliGRAM(s) IV Push every 3 minutes PRN For ANY of the following changes in patient status:  A. RR LESS THAN 10 breaths per minute, B. Oxygen saturation LESS THAN 90%, C. Sedation score of 6  ondansetron Injectable 4 milliGRAM(s) IV Push every 8 hours PRN Nausea and/or Vomiting      Vital Signs Last 24 Hrs  T(C): 36.5 (01-04-24 @ 04:37), Max: 36.9 (01-03-24 @ 14:15)  T(F): 97.7 (01-04-24 @ 04:37), Max: 98.5 (01-03-24 @ 14:15)  HR: 79 (01-04-24 @ 04:37) (72 - 96)  BP: 96/62 (01-04-24 @ 04:37) (96/62 - 114/78)  BP(mean): --  RR: 18 (01-04-24 @ 04:37) (18 - 18)  SpO2: 96% (01-04-24 @ 04:37) (96% - 100%)      Gen - alert and oriented  Heart - s1s2 RRR  Lung - CTA b/l  Abd - mild tender  Ext no edema               Labs:                          9.1    3.70  )-----------( 345      ( 04 Jan 2024 06:16 )             29.3                           8.2    3.34  )-----------( 250      ( 03 Jan 2024 07:28 )             26.8     01-03    142  |  110<H>  |  14.1  ----------------------------<  92  3.5   |  23.0  |  0.48<L>    Ca    8.2<L>      03 Jan 2024 07:28  Phos  3.7     01-03  Mg     1.9     01-03    TPro  5.6<L>  /  Alb  3.4  /  TBili  0.9  /  DBili  x   /  AST  35<H>  /  ALT  16  /  AlkPhos  192<H>  01-02   37 yo F known patient of Dr Chapa at [facility], metastatic breast ca on Enhertu and with prior GIB requiring blood transfusion is admitted to the hospital again due to symptomatic anemia.  Her hemoglobin was found to be 7.5.  She received 1 unit of PRBC and hemoglobin is 8.2 now.  She also has chronic cancer-related pain noted in her back.  She says that in the emergency room she received Dilaudid 2 mg IV which seem to help but says that as inpatient she received a dose of Dilaudid 1 mg which was not enough and would like to request a higher dose.  She says that last night her bowel movement was very dark and she feels it was likely mixed with blood.   She had a PET-CT and brain MRI done within the past few days which overall showed stable disease. Colitis noted on CT. She has been started on IV antibiotics.     pt cont to have black stools   pt going for CTE today       ROS  as per HPI    PAST MEDICAL & SURGICAL HISTORY:  H/O compression fracture of spine      Anxiety      Metastatic breast cancer      H/O pleural effusion      Pericardial effusion      S/P tonsillectomy      H/O chest tube placement  12/23/21      S/P pericardiocentesis  12/28/21      Social History:    FAMILY HISTORY:  FH: CVA (cerebrovascular accident)        MEDICATIONS  (STANDING):  chlorhexidine 2% Cloths 1 Application(s) Topical <User Schedule>  folic acid 1 milliGRAM(s) Oral daily  hydrocortisone hemorrhoidal Suppository 1 Suppository(s) Rectal at bedtime  HYDROmorphone PCA (1 mG/mL) 30 milliLiter(s) PCA Continuous PCA Continuous  memantine 10 milliGRAM(s) Oral two times a day  methadone    Tablet 10 milliGRAM(s) Oral <User Schedule>  methadone    Tablet 15 milliGRAM(s) Oral at bedtime  multivitamin 1 Tablet(s) Oral daily  naloxegol 25 milliGRAM(s) Oral daily  pantoprazole  Injectable 40 milliGRAM(s) IV Push daily  piperacillin/tazobactam IVPB.- 3.375 Gram(s) IV Intermittent once  piperacillin/tazobactam IVPB.. 3.375 Gram(s) IV Intermittent every 8 hours  polyethylene glycol 3350 17 Gram(s) Oral at bedtime  pregabalin 150 milliGRAM(s) Oral three times a day  sodium chloride 0.9% lock flush 3 milliLiter(s) IV Push every 8 hours    MEDICATIONS  (PRN):  acetaminophen     Tablet .. 650 milliGRAM(s) Oral every 6 hours PRN Temp greater or equal to 38C (100.4F), Mild Pain (1 - 3)  aluminum hydroxide/magnesium hydroxide/simethicone Suspension 30 milliLiter(s) Oral every 4 hours PRN Dyspepsia  glycerin Suppository - Adult 1 Suppository(s) Rectal at bedtime PRN Constipation  LORazepam     Tablet 0.5 milliGRAM(s) Oral two times a day PRN Anxiety  melatonin 3 milliGRAM(s) Oral at bedtime PRN Insomnia  naloxone Injectable 0.1 milliGRAM(s) IV Push every 3 minutes PRN For ANY of the following changes in patient status:  A. RR LESS THAN 10 breaths per minute, B. Oxygen saturation LESS THAN 90%, C. Sedation score of 6  ondansetron Injectable 4 milliGRAM(s) IV Push every 8 hours PRN Nausea and/or Vomiting      Vital Signs Last 24 Hrs  T(C): 36.5 (01-04-24 @ 04:37), Max: 36.9 (01-03-24 @ 14:15)  T(F): 97.7 (01-04-24 @ 04:37), Max: 98.5 (01-03-24 @ 14:15)  HR: 79 (01-04-24 @ 04:37) (72 - 96)  BP: 96/62 (01-04-24 @ 04:37) (96/62 - 114/78)  BP(mean): --  RR: 18 (01-04-24 @ 04:37) (18 - 18)  SpO2: 96% (01-04-24 @ 04:37) (96% - 100%)      Gen - alert and oriented  Heart - s1s2 RRR  Lung - CTA b/l  Abd - mild tender  Ext no edema               Labs:                          9.1    3.70  )-----------( 345      ( 04 Jan 2024 06:16 )             29.3                           8.2    3.34  )-----------( 250      ( 03 Jan 2024 07:28 )             26.8     01-03    142  |  110<H>  |  14.1  ----------------------------<  92  3.5   |  23.0  |  0.48<L>    Ca    8.2<L>      03 Jan 2024 07:28  Phos  3.7     01-03  Mg     1.9     01-03    TPro  5.6<L>  /  Alb  3.4  /  TBili  0.9  /  DBili  x   /  AST  35<H>  /  ALT  16  /  AlkPhos  192<H>  01-02   39 yo F known patient of Dr Chapa at [facility], metastatic breast ca on Enhertu and with prior GIB requiring blood transfusion is admitted to the hospital again due to symptomatic anemia.  Her hemoglobin was found to be 7.5.  She received 1 unit of PRBC and hemoglobin is 8.2 now.  She also has chronic cancer-related pain noted in her back.  She says that in the emergency room she received Dilaudid 2 mg IV which seem to help but says that as inpatient she received a dose of Dilaudid 1 mg which was not enough and would like to request a higher dose.  She says that last night her bowel movement was very dark and she feels it was likely mixed with blood.   She had a PET-CT and brain MRI done within the past few days which overall showed stable disease. Colitis noted on CT. She has been started on IV antibiotics.     pt cont to have black stools   pt going for CTE today       ROS  as per HPI    PAST MEDICAL & SURGICAL HISTORY:  H/O compression fracture of spine      Anxiety      Metastatic breast cancer      H/O pleural effusion      Pericardial effusion      S/P tonsillectomy      H/O chest tube placement  12/23/21      S/P pericardiocentesis  12/28/21      Social History:    FAMILY HISTORY:  FH: CVA (cerebrovascular accident)        MEDICATIONS  (STANDING):  chlorhexidine 2% Cloths 1 Application(s) Topical <User Schedule>  folic acid 1 milliGRAM(s) Oral daily  hydrocortisone hemorrhoidal Suppository 1 Suppository(s) Rectal at bedtime  HYDROmorphone PCA (1 mG/mL) 30 milliLiter(s) PCA Continuous PCA Continuous  memantine 10 milliGRAM(s) Oral two times a day  methadone    Tablet 10 milliGRAM(s) Oral <User Schedule>  methadone    Tablet 15 milliGRAM(s) Oral at bedtime  multivitamin 1 Tablet(s) Oral daily  naloxegol 25 milliGRAM(s) Oral daily  pantoprazole  Injectable 40 milliGRAM(s) IV Push daily  piperacillin/tazobactam IVPB.- 3.375 Gram(s) IV Intermittent once  piperacillin/tazobactam IVPB.. 3.375 Gram(s) IV Intermittent every 8 hours  polyethylene glycol 3350 17 Gram(s) Oral at bedtime  pregabalin 150 milliGRAM(s) Oral three times a day  sodium chloride 0.9% lock flush 3 milliLiter(s) IV Push every 8 hours    MEDICATIONS  (PRN):  acetaminophen     Tablet .. 650 milliGRAM(s) Oral every 6 hours PRN Temp greater or equal to 38C (100.4F), Mild Pain (1 - 3)  aluminum hydroxide/magnesium hydroxide/simethicone Suspension 30 milliLiter(s) Oral every 4 hours PRN Dyspepsia  glycerin Suppository - Adult 1 Suppository(s) Rectal at bedtime PRN Constipation  LORazepam     Tablet 0.5 milliGRAM(s) Oral two times a day PRN Anxiety  melatonin 3 milliGRAM(s) Oral at bedtime PRN Insomnia  naloxone Injectable 0.1 milliGRAM(s) IV Push every 3 minutes PRN For ANY of the following changes in patient status:  A. RR LESS THAN 10 breaths per minute, B. Oxygen saturation LESS THAN 90%, C. Sedation score of 6  ondansetron Injectable 4 milliGRAM(s) IV Push every 8 hours PRN Nausea and/or Vomiting      Vital Signs Last 24 Hrs  T(C): 36.5 (01-04-24 @ 04:37), Max: 36.9 (01-03-24 @ 14:15)  T(F): 97.7 (01-04-24 @ 04:37), Max: 98.5 (01-03-24 @ 14:15)  HR: 79 (01-04-24 @ 04:37) (72 - 96)  BP: 96/62 (01-04-24 @ 04:37) (96/62 - 114/78)  BP(mean): --  RR: 18 (01-04-24 @ 04:37) (18 - 18)  SpO2: 96% (01-04-24 @ 04:37) (96% - 100%)      Gen - alert and oriented  Heart - s1s2 RRR  Lung - CTA b/l  Abd - mild tender  Ext no edema               Labs:                          9.1    3.70  )-----------( 345      ( 04 Jan 2024 06:16 )             29.3                           8.2    3.34  )-----------( 250      ( 03 Jan 2024 07:28 )             26.8     01-03    142  |  110<H>  |  14.1  ----------------------------<  92  3.5   |  23.0  |  0.48<L>    Ca    8.2<L>      03 Jan 2024 07:28  Phos  3.7     01-03  Mg     1.9     01-03    TPro  5.6<L>  /  Alb  3.4  /  TBili  0.9  /  DBili  x   /  AST  35<H>  /  ALT  16  /  AlkPhos  192<H>  01-02

## 2024-01-04 NOTE — DISCHARGE NOTE PROVIDER - NSDCCPCAREPLAN_GEN_ALL_CORE_FT
PRINCIPAL DISCHARGE DIAGNOSIS  Diagnosis: Symptomatic anemia  Assessment and Plan of Treatment:      PRINCIPAL DISCHARGE DIAGNOSIS  Diagnosis: Symptomatic anemia  Assessment and Plan of Treatment: Follow-up with your outpatient provider for further monitoring of your blood counts.   Monitor for signs/symptoms indicating worsening of disease, such as, easy bleeding/bruising, pale skin, fatigue, dizziness, increased heart rate, or chest pain.      SECONDARY DISCHARGE DIAGNOSES  Diagnosis: Melena  Assessment and Plan of Treatment: Follow up with your primary care provider to continue to monitor for changes in stool color.     PRINCIPAL DISCHARGE DIAGNOSIS  Diagnosis: Symptomatic anemia  Assessment and Plan of Treatment: You received 3 units of PRBCs during your hospitalization. No source of bleeding was found. PleaseFollow-up with your outpatient provider for further monitoring of your blood counts and to discuss possible bone marrow biopsy to rule out infiltrative disease.  Monitor for signs/symptoms indicating worsening of disease, such as, easy bleeding/bruising, pale skin, fatigue, dizziness, increased heart rate, or chest pain.      SECONDARY DISCHARGE DIAGNOSES  Diagnosis: Melena  Assessment and Plan of Treatment: continue protonix twice a day  follow up with Dr. Rosado as previously scheduled    Diagnosis: Breast cancer  Assessment and Plan of Treatment: please follow up with Dr. Chapa as scheduled    Diagnosis: Colitis  Assessment and Plan of Treatment: you have completed a course of antibiotics for colitis  follow up with your PCP within 1 week  follow up with Dr. Rosado as scheduled    Diagnosis: History of RSV infection  Assessment and Plan of Treatment: you were incidentally noted to have RSV but were asymptomatic  follow up with your PCP within 1 week

## 2024-01-04 NOTE — PROGRESS NOTE ADULT - ASSESSMENT
39 y/o female with recurrent symptomatic anemia, Leukopenia, Stage-4 breast cancer    Assessment/Plan:    1. Recurrent symptomatic anemia:  -Multi-factorial from likely recurrent IH bleeding, gastritis, prior chemo, active malignancy   -Prior colonoscopy reviewed in 11/23  -Reports "enteritis" on Capsular endoscopy Started on Empiric IV Zosyn   - CTA results pending   -IV PPI  - Hb/hct stable  -Iron studies done by ED not interpretable based on multiple recent PRBC transfusions   -Not on NSAIDs or AC  -Miralax HS, glycerine suppositories prn  - MOvantik started for constipation-  chronic high dose opioids needed for cancer related pain   -Onc f/u with Dr. Chapa  -VC bateman, ilana, ambulate    2. Leukopenia:  -Likely related to malignancy/prior chemo  -Trend    3. Stage-4 Breast cancer:  - Continue Dilaudid PCA, pain controlled  - Methadone PO

## 2024-01-04 NOTE — PROGRESS NOTE ADULT - NS ATTEND AMEND GEN_ALL_CORE FT
I agree with assessment and plan as above. Pt is a 39 yo F with metastatic breast cancer with diffuse osseous mets, prior malignant pericardial effusion, pleural effusion s/p drain, s/p chemo and radiation, chemo now recurrent symptomatic anemia requiring transfusion. Patient reports multiple dark stools yesterday associated with severe cramping abdominal pain. Recent GI work up as outlined above. Recent VCE showed severe enteritis, CTE is pending read. Continue PPI and supportive care. Diet as tolerated. Trend Hgb daily. I agree with assessment and plan as above. Pt is a 37 yo F with metastatic breast cancer with diffuse osseous mets, prior malignant pericardial effusion, pleural effusion s/p drain, s/p chemo and radiation, chemo now recurrent symptomatic anemia requiring transfusion. Patient reports multiple dark stools yesterday associated with severe cramping abdominal pain. Recent GI work up as outlined above. Recent VCE showed severe enteritis, CTE is pending read. Continue PPI and supportive care. Diet as tolerated. Trend Hgb daily.

## 2024-01-05 LAB
ALBUMIN SERPL ELPH-MCNC: 3 G/DL — LOW (ref 3.3–5.2)
ALBUMIN SERPL ELPH-MCNC: 3 G/DL — LOW (ref 3.3–5.2)
ALP SERPL-CCNC: 157 U/L — HIGH (ref 40–120)
ALP SERPL-CCNC: 157 U/L — HIGH (ref 40–120)
ALT FLD-CCNC: 14 U/L — SIGNIFICANT CHANGE UP
ALT FLD-CCNC: 14 U/L — SIGNIFICANT CHANGE UP
ANION GAP SERPL CALC-SCNC: 11 MMOL/L — SIGNIFICANT CHANGE UP (ref 5–17)
ANION GAP SERPL CALC-SCNC: 11 MMOL/L — SIGNIFICANT CHANGE UP (ref 5–17)
AST SERPL-CCNC: 32 U/L — HIGH
AST SERPL-CCNC: 32 U/L — HIGH
B PERT DNA SPEC QL NAA+PROBE: SIGNIFICANT CHANGE UP
B PERT DNA SPEC QL NAA+PROBE: SIGNIFICANT CHANGE UP
BASOPHILS # BLD AUTO: 0.01 K/UL — SIGNIFICANT CHANGE UP (ref 0–0.2)
BASOPHILS # BLD AUTO: 0.01 K/UL — SIGNIFICANT CHANGE UP (ref 0–0.2)
BASOPHILS NFR BLD AUTO: 0.4 % — SIGNIFICANT CHANGE UP (ref 0–2)
BASOPHILS NFR BLD AUTO: 0.4 % — SIGNIFICANT CHANGE UP (ref 0–2)
BILIRUB SERPL-MCNC: 0.7 MG/DL — SIGNIFICANT CHANGE UP (ref 0.4–2)
BILIRUB SERPL-MCNC: 0.7 MG/DL — SIGNIFICANT CHANGE UP (ref 0.4–2)
BUN SERPL-MCNC: 12.8 MG/DL — SIGNIFICANT CHANGE UP (ref 8–20)
BUN SERPL-MCNC: 12.8 MG/DL — SIGNIFICANT CHANGE UP (ref 8–20)
C PNEUM DNA SPEC QL NAA+PROBE: SIGNIFICANT CHANGE UP
C PNEUM DNA SPEC QL NAA+PROBE: SIGNIFICANT CHANGE UP
CALCIUM SERPL-MCNC: 8.4 MG/DL — SIGNIFICANT CHANGE UP (ref 8.4–10.5)
CALCIUM SERPL-MCNC: 8.4 MG/DL — SIGNIFICANT CHANGE UP (ref 8.4–10.5)
CHLORIDE SERPL-SCNC: 106 MMOL/L — SIGNIFICANT CHANGE UP (ref 96–108)
CHLORIDE SERPL-SCNC: 106 MMOL/L — SIGNIFICANT CHANGE UP (ref 96–108)
CO2 SERPL-SCNC: 23 MMOL/L — SIGNIFICANT CHANGE UP (ref 22–29)
CO2 SERPL-SCNC: 23 MMOL/L — SIGNIFICANT CHANGE UP (ref 22–29)
CREAT SERPL-MCNC: 0.45 MG/DL — LOW (ref 0.5–1.3)
CREAT SERPL-MCNC: 0.45 MG/DL — LOW (ref 0.5–1.3)
EGFR: 127 ML/MIN/1.73M2 — SIGNIFICANT CHANGE UP
EGFR: 127 ML/MIN/1.73M2 — SIGNIFICANT CHANGE UP
EOSINOPHIL # BLD AUTO: 0.07 K/UL — SIGNIFICANT CHANGE UP (ref 0–0.5)
EOSINOPHIL # BLD AUTO: 0.07 K/UL — SIGNIFICANT CHANGE UP (ref 0–0.5)
EOSINOPHIL NFR BLD AUTO: 2.5 % — SIGNIFICANT CHANGE UP (ref 0–6)
EOSINOPHIL NFR BLD AUTO: 2.5 % — SIGNIFICANT CHANGE UP (ref 0–6)
FLUAV H1 2009 PAND RNA SPEC QL NAA+PROBE: SIGNIFICANT CHANGE UP
FLUAV H1 2009 PAND RNA SPEC QL NAA+PROBE: SIGNIFICANT CHANGE UP
FLUAV H1 RNA SPEC QL NAA+PROBE: SIGNIFICANT CHANGE UP
FLUAV H1 RNA SPEC QL NAA+PROBE: SIGNIFICANT CHANGE UP
FLUAV H3 RNA SPEC QL NAA+PROBE: SIGNIFICANT CHANGE UP
FLUAV H3 RNA SPEC QL NAA+PROBE: SIGNIFICANT CHANGE UP
FLUAV SUBTYP SPEC NAA+PROBE: SIGNIFICANT CHANGE UP
FLUAV SUBTYP SPEC NAA+PROBE: SIGNIFICANT CHANGE UP
FLUBV RNA SPEC QL NAA+PROBE: SIGNIFICANT CHANGE UP
FLUBV RNA SPEC QL NAA+PROBE: SIGNIFICANT CHANGE UP
GLUCOSE SERPL-MCNC: 79 MG/DL — SIGNIFICANT CHANGE UP (ref 70–99)
GLUCOSE SERPL-MCNC: 79 MG/DL — SIGNIFICANT CHANGE UP (ref 70–99)
HADV DNA SPEC QL NAA+PROBE: SIGNIFICANT CHANGE UP
HADV DNA SPEC QL NAA+PROBE: SIGNIFICANT CHANGE UP
HCOV PNL SPEC NAA+PROBE: SIGNIFICANT CHANGE UP
HCOV PNL SPEC NAA+PROBE: SIGNIFICANT CHANGE UP
HCT VFR BLD CALC: 25.3 % — LOW (ref 34.5–45)
HCT VFR BLD CALC: 25.3 % — LOW (ref 34.5–45)
HGB BLD-MCNC: 7.6 G/DL — LOW (ref 11.5–15.5)
HGB BLD-MCNC: 7.6 G/DL — LOW (ref 11.5–15.5)
HMPV RNA SPEC QL NAA+PROBE: SIGNIFICANT CHANGE UP
HMPV RNA SPEC QL NAA+PROBE: SIGNIFICANT CHANGE UP
HPIV1 RNA SPEC QL NAA+PROBE: SIGNIFICANT CHANGE UP
HPIV1 RNA SPEC QL NAA+PROBE: SIGNIFICANT CHANGE UP
HPIV2 RNA SPEC QL NAA+PROBE: SIGNIFICANT CHANGE UP
HPIV2 RNA SPEC QL NAA+PROBE: SIGNIFICANT CHANGE UP
HPIV3 RNA SPEC QL NAA+PROBE: SIGNIFICANT CHANGE UP
HPIV3 RNA SPEC QL NAA+PROBE: SIGNIFICANT CHANGE UP
HPIV4 RNA SPEC QL NAA+PROBE: SIGNIFICANT CHANGE UP
HPIV4 RNA SPEC QL NAA+PROBE: SIGNIFICANT CHANGE UP
IMM GRANULOCYTES NFR BLD AUTO: 0.7 % — SIGNIFICANT CHANGE UP (ref 0–0.9)
IMM GRANULOCYTES NFR BLD AUTO: 0.7 % — SIGNIFICANT CHANGE UP (ref 0–0.9)
LYMPHOCYTES # BLD AUTO: 1.05 K/UL — SIGNIFICANT CHANGE UP (ref 1–3.3)
LYMPHOCYTES # BLD AUTO: 1.05 K/UL — SIGNIFICANT CHANGE UP (ref 1–3.3)
LYMPHOCYTES # BLD AUTO: 37.5 % — SIGNIFICANT CHANGE UP (ref 13–44)
LYMPHOCYTES # BLD AUTO: 37.5 % — SIGNIFICANT CHANGE UP (ref 13–44)
MCHC RBC-ENTMCNC: 28 PG — SIGNIFICANT CHANGE UP (ref 27–34)
MCHC RBC-ENTMCNC: 28 PG — SIGNIFICANT CHANGE UP (ref 27–34)
MCHC RBC-ENTMCNC: 30 GM/DL — LOW (ref 32–36)
MCHC RBC-ENTMCNC: 30 GM/DL — LOW (ref 32–36)
MCV RBC AUTO: 93.4 FL — SIGNIFICANT CHANGE UP (ref 80–100)
MCV RBC AUTO: 93.4 FL — SIGNIFICANT CHANGE UP (ref 80–100)
MONOCYTES # BLD AUTO: 0.31 K/UL — SIGNIFICANT CHANGE UP (ref 0–0.9)
MONOCYTES # BLD AUTO: 0.31 K/UL — SIGNIFICANT CHANGE UP (ref 0–0.9)
MONOCYTES NFR BLD AUTO: 11.1 % — SIGNIFICANT CHANGE UP (ref 2–14)
MONOCYTES NFR BLD AUTO: 11.1 % — SIGNIFICANT CHANGE UP (ref 2–14)
NEUTROPHILS # BLD AUTO: 1.34 K/UL — LOW (ref 1.8–7.4)
NEUTROPHILS # BLD AUTO: 1.34 K/UL — LOW (ref 1.8–7.4)
NEUTROPHILS NFR BLD AUTO: 47.8 % — SIGNIFICANT CHANGE UP (ref 43–77)
NEUTROPHILS NFR BLD AUTO: 47.8 % — SIGNIFICANT CHANGE UP (ref 43–77)
PLATELET # BLD AUTO: 249 K/UL — SIGNIFICANT CHANGE UP (ref 150–400)
PLATELET # BLD AUTO: 249 K/UL — SIGNIFICANT CHANGE UP (ref 150–400)
POTASSIUM SERPL-MCNC: 3.4 MMOL/L — LOW (ref 3.5–5.3)
POTASSIUM SERPL-MCNC: 3.4 MMOL/L — LOW (ref 3.5–5.3)
POTASSIUM SERPL-SCNC: 3.4 MMOL/L — LOW (ref 3.5–5.3)
POTASSIUM SERPL-SCNC: 3.4 MMOL/L — LOW (ref 3.5–5.3)
PROT SERPL-MCNC: 5.1 G/DL — LOW (ref 6.6–8.7)
PROT SERPL-MCNC: 5.1 G/DL — LOW (ref 6.6–8.7)
RAPID RVP RESULT: DETECTED
RAPID RVP RESULT: DETECTED
RBC # BLD: 2.71 M/UL — LOW (ref 3.8–5.2)
RBC # BLD: 2.71 M/UL — LOW (ref 3.8–5.2)
RBC # FLD: 23.2 % — HIGH (ref 10.3–14.5)
RBC # FLD: 23.2 % — HIGH (ref 10.3–14.5)
RSV RNA SPEC QL NAA+PROBE: DETECTED
RSV RNA SPEC QL NAA+PROBE: DETECTED
RV+EV RNA SPEC QL NAA+PROBE: SIGNIFICANT CHANGE UP
RV+EV RNA SPEC QL NAA+PROBE: SIGNIFICANT CHANGE UP
SARS-COV-2 RNA SPEC QL NAA+PROBE: SIGNIFICANT CHANGE UP
SARS-COV-2 RNA SPEC QL NAA+PROBE: SIGNIFICANT CHANGE UP
SODIUM SERPL-SCNC: 140 MMOL/L — SIGNIFICANT CHANGE UP (ref 135–145)
SODIUM SERPL-SCNC: 140 MMOL/L — SIGNIFICANT CHANGE UP (ref 135–145)
WBC # BLD: 2.8 K/UL — LOW (ref 3.8–10.5)
WBC # BLD: 2.8 K/UL — LOW (ref 3.8–10.5)
WBC # FLD AUTO: 2.8 K/UL — LOW (ref 3.8–10.5)
WBC # FLD AUTO: 2.8 K/UL — LOW (ref 3.8–10.5)

## 2024-01-05 PROCEDURE — 99233 SBSQ HOSP IP/OBS HIGH 50: CPT

## 2024-01-05 PROCEDURE — 99232 SBSQ HOSP IP/OBS MODERATE 35: CPT

## 2024-01-05 RX ORDER — POTASSIUM CHLORIDE 20 MEQ
40 PACKET (EA) ORAL ONCE
Refills: 0 | Status: COMPLETED | OUTPATIENT
Start: 2024-01-05 | End: 2024-01-05

## 2024-01-05 RX ORDER — PANTOPRAZOLE SODIUM 20 MG/1
40 TABLET, DELAYED RELEASE ORAL EVERY 12 HOURS
Refills: 0 | Status: DISCONTINUED | OUTPATIENT
Start: 2024-01-05 | End: 2024-01-10

## 2024-01-05 RX ORDER — METHADONE HYDROCHLORIDE 40 MG/1
20 TABLET ORAL AT BEDTIME
Refills: 0 | Status: DISCONTINUED | OUTPATIENT
Start: 2024-01-05 | End: 2024-01-08

## 2024-01-05 RX ADMIN — Medication 150 MILLIGRAM(S): at 21:40

## 2024-01-05 RX ADMIN — HYDROMORPHONE HYDROCHLORIDE 30 MILLILITER(S): 2 INJECTION INTRAMUSCULAR; INTRAVENOUS; SUBCUTANEOUS at 19:25

## 2024-01-05 RX ADMIN — PIPERACILLIN AND TAZOBACTAM 25 GRAM(S): 4; .5 INJECTION, POWDER, LYOPHILIZED, FOR SOLUTION INTRAVENOUS at 13:08

## 2024-01-05 RX ADMIN — NALOXEGOL OXALATE 25 MILLIGRAM(S): 12.5 TABLET, FILM COATED ORAL at 13:41

## 2024-01-05 RX ADMIN — Medication 0.5 MILLIGRAM(S): at 21:40

## 2024-01-05 RX ADMIN — Medication 150 MILLIGRAM(S): at 13:06

## 2024-01-05 RX ADMIN — Medication 1 TABLET(S): at 13:06

## 2024-01-05 RX ADMIN — HYDROMORPHONE HYDROCHLORIDE 30 MILLILITER(S): 2 INJECTION INTRAMUSCULAR; INTRAVENOUS; SUBCUTANEOUS at 07:35

## 2024-01-05 RX ADMIN — METHADONE HYDROCHLORIDE 20 MILLIGRAM(S): 40 TABLET ORAL at 21:39

## 2024-01-05 RX ADMIN — MEMANTINE HYDROCHLORIDE 10 MILLIGRAM(S): 10 TABLET ORAL at 05:14

## 2024-01-05 RX ADMIN — SODIUM CHLORIDE 3 MILLILITER(S): 9 INJECTION INTRAMUSCULAR; INTRAVENOUS; SUBCUTANEOUS at 05:19

## 2024-01-05 RX ADMIN — PANTOPRAZOLE SODIUM 40 MILLIGRAM(S): 20 TABLET, DELAYED RELEASE ORAL at 18:07

## 2024-01-05 RX ADMIN — METHADONE HYDROCHLORIDE 10 MILLIGRAM(S): 40 TABLET ORAL at 05:14

## 2024-01-05 RX ADMIN — SODIUM CHLORIDE 3 MILLILITER(S): 9 INJECTION INTRAMUSCULAR; INTRAVENOUS; SUBCUTANEOUS at 21:46

## 2024-01-05 RX ADMIN — SODIUM CHLORIDE 3 MILLILITER(S): 9 INJECTION INTRAMUSCULAR; INTRAVENOUS; SUBCUTANEOUS at 14:08

## 2024-01-05 RX ADMIN — HYDROMORPHONE HYDROCHLORIDE 30 MILLILITER(S): 2 INJECTION INTRAMUSCULAR; INTRAVENOUS; SUBCUTANEOUS at 23:43

## 2024-01-05 RX ADMIN — CHLORHEXIDINE GLUCONATE 1 APPLICATION(S): 213 SOLUTION TOPICAL at 05:14

## 2024-01-05 RX ADMIN — PIPERACILLIN AND TAZOBACTAM 25 GRAM(S): 4; .5 INJECTION, POWDER, LYOPHILIZED, FOR SOLUTION INTRAVENOUS at 05:14

## 2024-01-05 RX ADMIN — PIPERACILLIN AND TAZOBACTAM 25 GRAM(S): 4; .5 INJECTION, POWDER, LYOPHILIZED, FOR SOLUTION INTRAVENOUS at 21:42

## 2024-01-05 RX ADMIN — Medication 1 MILLIGRAM(S): at 13:06

## 2024-01-05 RX ADMIN — Medication 40 MILLIEQUIVALENT(S): at 13:29

## 2024-01-05 RX ADMIN — Medication 150 MILLIGRAM(S): at 05:14

## 2024-01-05 RX ADMIN — MEMANTINE HYDROCHLORIDE 10 MILLIGRAM(S): 10 TABLET ORAL at 18:07

## 2024-01-05 RX ADMIN — METHADONE HYDROCHLORIDE 10 MILLIGRAM(S): 40 TABLET ORAL at 13:29

## 2024-01-05 RX ADMIN — Medication 80 MILLIGRAM(S): at 21:40

## 2024-01-05 RX ADMIN — HYDROMORPHONE HYDROCHLORIDE 30 MILLILITER(S): 2 INJECTION INTRAMUSCULAR; INTRAVENOUS; SUBCUTANEOUS at 05:09

## 2024-01-05 NOTE — PROGRESS NOTE ADULT - SUBJECTIVE AND OBJECTIVE BOX
Interval Hx:  Patient seen during rounds  Patient reports pain to be controlled on current medications  reports increased pain at night  Patient denies sedation with medications     Analgesic Dosing for past 24 hours reviewed as below:    FLUoxetine   80 milliGRAM(s) Oral (01-04-24 @ 21:36)    LORazepam     Tablet   0.5 milliGRAM(s) Oral (01-04-24 @ 21:36)    memantine   10 milliGRAM(s) Oral (01-05-24 @ 05:14)   10 milliGRAM(s) Oral (01-04-24 @ 17:32)    methadone    Tablet   10 milliGRAM(s) Oral (01-05-24 @ 05:14)   10 milliGRAM(s) Oral (01-04-24 @ 14:41)    methadone    Tablet   15 milliGRAM(s) Oral (01-04-24 @ 21:36)    pregabalin   150 milliGRAM(s) Oral (01-05-24 @ 13:06)   150 milliGRAM(s) Oral (01-05-24 @ 05:14)   150 milliGRAM(s) Oral (01-04-24 @ 21:36)   150 milliGRAM(s) Oral (01-04-24 @ 14:36)          T(C): 36.7 (01-05-24 @ 04:39), Max: 36.7 (01-04-24 @ 16:36)  HR: 77 (01-05-24 @ 04:39) (77 - 97)  BP: 95/61 (01-05-24 @ 04:39) (95/61 - 110/71)  RR: 18 (01-05-24 @ 04:39) (18 - 18)  SpO2: 99% (01-05-24 @ 04:39) (97% - 99%)        acetaminophen     Tablet .. 650 milliGRAM(s) Oral every 6 hours PRN  aluminum hydroxide/magnesium hydroxide/simethicone Suspension 30 milliLiter(s) Oral every 4 hours PRN  artificial  tears Solution 1 Drop(s) Both EYES two times a day  chlorhexidine 2% Cloths 1 Application(s) Topical <User Schedule>  FLUoxetine 80 milliGRAM(s) Oral at bedtime  folic acid 1 milliGRAM(s) Oral daily  glycerin Suppository - Adult 1 Suppository(s) Rectal at bedtime PRN  hydrocortisone hemorrhoidal Suppository 1 Suppository(s) Rectal at bedtime  HYDROmorphone PCA (1 mG/mL) 30 milliLiter(s) PCA Continuous PCA Continuous  LORazepam     Tablet 0.5 milliGRAM(s) Oral two times a day PRN  melatonin 3 milliGRAM(s) Oral at bedtime PRN  memantine 10 milliGRAM(s) Oral two times a day  methadone    Tablet 20 milliGRAM(s) Oral at bedtime  methadone    Tablet 10 milliGRAM(s) Oral <User Schedule>  multivitamin 1 Tablet(s) Oral daily  naloxegol 25 milliGRAM(s) Oral daily  naloxone Injectable 0.1 milliGRAM(s) IV Push every 3 minutes PRN  ondansetron Injectable 4 milliGRAM(s) IV Push every 8 hours PRN  pantoprazole  Injectable 40 milliGRAM(s) IV Push every 12 hours  piperacillin/tazobactam IVPB.. 3.375 Gram(s) IV Intermittent every 8 hours  polyethylene glycol 3350 17 Gram(s) Oral at bedtime  potassium chloride    Tablet ER 40 milliEquivalent(s) Oral once  pregabalin 150 milliGRAM(s) Oral three times a day  sodium chloride 0.9% lock flush 3 milliLiter(s) IV Push every 8 hours                          7.6    2.80  )-----------( 249      ( 05 Jan 2024 05:51 )             25.3     01-05    140  |  106  |  12.8  ----------------------------<  79  3.4<L>   |  23.0  |  0.45<L>    Ca    8.4      05 Jan 2024 05:51    TPro  5.1<L>  /  Alb  3.0<L>  /  TBili  0.7  /  DBili  x   /  AST  32<H>  /  ALT  14  /  AlkPhos  157<H>  01-05      Urinalysis Basic - ( 05 Jan 2024 05:51 )    Color: x / Appearance: x / SG: x / pH: x  Gluc: 79 mg/dL / Ketone: x  / Bili: x / Urobili: x   Blood: x / Protein: x / Nitrite: x   Leuk Esterase: x / RBC: x / WBC x   Sq Epi: x / Non Sq Epi: x / Bacteria: x        Pain Service   [phone] Interval Hx:  Patient seen during rounds  Patient reports pain to be controlled on current medications  reports increased pain at night  Patient denies sedation with medications     Analgesic Dosing for past 24 hours reviewed as below:    FLUoxetine   80 milliGRAM(s) Oral (01-04-24 @ 21:36)    LORazepam     Tablet   0.5 milliGRAM(s) Oral (01-04-24 @ 21:36)    memantine   10 milliGRAM(s) Oral (01-05-24 @ 05:14)   10 milliGRAM(s) Oral (01-04-24 @ 17:32)    methadone    Tablet   10 milliGRAM(s) Oral (01-05-24 @ 05:14)   10 milliGRAM(s) Oral (01-04-24 @ 14:41)    methadone    Tablet   15 milliGRAM(s) Oral (01-04-24 @ 21:36)    pregabalin   150 milliGRAM(s) Oral (01-05-24 @ 13:06)   150 milliGRAM(s) Oral (01-05-24 @ 05:14)   150 milliGRAM(s) Oral (01-04-24 @ 21:36)   150 milliGRAM(s) Oral (01-04-24 @ 14:36)          T(C): 36.7 (01-05-24 @ 04:39), Max: 36.7 (01-04-24 @ 16:36)  HR: 77 (01-05-24 @ 04:39) (77 - 97)  BP: 95/61 (01-05-24 @ 04:39) (95/61 - 110/71)  RR: 18 (01-05-24 @ 04:39) (18 - 18)  SpO2: 99% (01-05-24 @ 04:39) (97% - 99%)        acetaminophen     Tablet .. 650 milliGRAM(s) Oral every 6 hours PRN  aluminum hydroxide/magnesium hydroxide/simethicone Suspension 30 milliLiter(s) Oral every 4 hours PRN  artificial  tears Solution 1 Drop(s) Both EYES two times a day  chlorhexidine 2% Cloths 1 Application(s) Topical <User Schedule>  FLUoxetine 80 milliGRAM(s) Oral at bedtime  folic acid 1 milliGRAM(s) Oral daily  glycerin Suppository - Adult 1 Suppository(s) Rectal at bedtime PRN  hydrocortisone hemorrhoidal Suppository 1 Suppository(s) Rectal at bedtime  HYDROmorphone PCA (1 mG/mL) 30 milliLiter(s) PCA Continuous PCA Continuous  LORazepam     Tablet 0.5 milliGRAM(s) Oral two times a day PRN  melatonin 3 milliGRAM(s) Oral at bedtime PRN  memantine 10 milliGRAM(s) Oral two times a day  methadone    Tablet 20 milliGRAM(s) Oral at bedtime  methadone    Tablet 10 milliGRAM(s) Oral <User Schedule>  multivitamin 1 Tablet(s) Oral daily  naloxegol 25 milliGRAM(s) Oral daily  naloxone Injectable 0.1 milliGRAM(s) IV Push every 3 minutes PRN  ondansetron Injectable 4 milliGRAM(s) IV Push every 8 hours PRN  pantoprazole  Injectable 40 milliGRAM(s) IV Push every 12 hours  piperacillin/tazobactam IVPB.. 3.375 Gram(s) IV Intermittent every 8 hours  polyethylene glycol 3350 17 Gram(s) Oral at bedtime  potassium chloride    Tablet ER 40 milliEquivalent(s) Oral once  pregabalin 150 milliGRAM(s) Oral three times a day  sodium chloride 0.9% lock flush 3 milliLiter(s) IV Push every 8 hours                          7.6    2.80  )-----------( 249      ( 05 Jan 2024 05:51 )             25.3     01-05    140  |  106  |  12.8  ----------------------------<  79  3.4<L>   |  23.0  |  0.45<L>    Ca    8.4      05 Jan 2024 05:51    TPro  5.1<L>  /  Alb  3.0<L>  /  TBili  0.7  /  DBili  x   /  AST  32<H>  /  ALT  14  /  AlkPhos  157<H>  01-05      Urinalysis Basic - ( 05 Jan 2024 05:51 )    Color: x / Appearance: x / SG: x / pH: x  Gluc: 79 mg/dL / Ketone: x  / Bili: x / Urobili: x   Blood: x / Protein: x / Nitrite: x   Leuk Esterase: x / RBC: x / WBC x   Sq Epi: x / Non Sq Epi: x / Bacteria: x        Pain Service   [phone]

## 2024-01-05 NOTE — PROGRESS NOTE ADULT - ASSESSMENT
39 y/o female with recurrent symptomatic anemia, Leukopenia, Stage-4 breast cancer    Assessment/Plan:    1. Recurrent symptomatic anemia:  -Multi-factorial from likely recurrent IH bleeding, gastritis, prior chemo, active malignancy   -Prior colonoscopy reviewed in 11/23  -Reports "enteritis" on Capsular endoscopy-- Started on Empiric IV Zosyn day 2  - CTE with gastritis and colitis  - GI PCR ordered  - Discussed with GI, plan for EGD on Monday  - Transfuse 1 unit PRBC today    -Continue IV PPI   -IV PPI  -Not on NSAIDs or AC  -Miralax HS, glycerine suppositories prn  - MOvantik started for constipation-  chronic high dose opioids needed for cancer related pain     2. Leukopenia:  -Likely related to malignancy/prior chemo  -Trend    3. Stage-4 Breast cancer:  - Continue Dilaudid PCA, pain controlled; Dr Gonzalez following   - Methadone PO     VTE_ SCds  39 y/o female with recurrent symptomatic anemia, Leukopenia, Stage-4 breast cancer    Assessment/Plan:    1. Recurrent symptomatic anemia:  -Multi-factorial from likely recurrent IH bleeding, gastritis, prior chemo, active malignancy   -Prior colonoscopy reviewed in 11/23  - Anusol suppositories for hemorrhoids daily  -Reports "enteritis" on Capsular endoscopy-- Started on Empiric IV Zosyn day 2  - CTE with gastritis and colitis  - GI PCR ordered  - Discussed with GI, plan for EGD on Monday  - Transfuse 1 unit PRBC today    -Continue IV PPI   -IV PPI  -Not on NSAIDs or AC  -Miralax HS, glycerine suppositories prn  - MOvantik started for constipation-  chronic high dose opioids needed for cancer related pain     2. Leukopenia:  -Likely related to malignancy/prior chemo  -Trend    3. Stage-4 Breast cancer:  - Continue Dilaudid PCA, pain controlled; Dr Gonzalez following   - Methadone PO     4. Anxiety   - On Prozac    VTE_ SCds  37 y/o female with recurrent symptomatic anemia, Leukopenia, Stage-4 breast cancer    Assessment/Plan:    1. Recurrent symptomatic anemia:  -Multi-factorial from likely recurrent IH bleeding, gastritis, prior chemo, active malignancy   -Prior colonoscopy reviewed in 11/23  - Anusol suppositories for hemorrhoids daily  -Reports "enteritis" on Capsular endoscopy-- Started on Empiric IV Zosyn day 2  - CTE with gastritis and colitis  - GI PCR ordered  - Discussed with GI, plan for EGD on Monday  - Transfuse 1 unit PRBC today    -Continue IV PPI   -IV PPI  -Not on NSAIDs or AC  -Miralax HS, glycerine suppositories prn  - MOvantik started for constipation-  chronic high dose opioids needed for cancer related pain     2. Leukopenia:  -Likely related to malignancy/prior chemo  -Trend    3. Stage-4 Breast cancer:  - Continue Dilaudid PCA, pain controlled; Dr Gonzalez following   - Methadone PO     4. Anxiety   - On Prozac    VTE_ SCds  39 y/o female with recurrent symptomatic anemia, Leukopenia, Stage-4 breast cancer    Assessment/Plan:    1. Recurrent symptomatic anemia:  -Multi-factorial from likely recurrent IH bleeding, gastritis, prior chemo, active malignancy   -Prior colonoscopy reviewed in 11/23  - Anusol suppositories for hemorrhoids daily  -Reports enteritis on Capsular endoscopy-- Started on Empiric IV Zosyn day 2  - CTE with gastritis and colitis  - GI PCR ordered  - Discussed with GI, plan for EGD on Monday  - Transfuse 1 unit PRBC today    -Continue IV PPI   -Not on NSAIDs or AC  - Movantik started for constipation-  chronic high dose opioids needed for cancer related pain     2. Leukopenia:  -Likely related to malignancy/prior chemo  -Trend    3. Stage-4 Breast cancer:  - Continue Dilaudid PCA, pain controlled; Dr Gonzalez following   - Methadone PO     4. Anxiety   - On Prozac    VTE_ SCds  37 y/o female with recurrent symptomatic anemia, Leukopenia, Stage-4 breast cancer    Assessment/Plan:    1. Recurrent symptomatic anemia:  -Multi-factorial from likely recurrent IH bleeding, gastritis, prior chemo, active malignancy   -Prior colonoscopy reviewed in 11/23  - Anusol suppositories for hemorrhoids daily  -Reports enteritis on Capsular endoscopy-- Started on Empiric IV Zosyn day 2  - CTE with gastritis and colitis  - GI PCR ordered  - Discussed with GI, plan for EGD on Monday  - Transfuse 1 unit PRBC today    -Continue IV PPI   -Not on NSAIDs or AC  - Movantik started for constipation-  chronic high dose opioids needed for cancer related pain     2. Leukopenia:  -Likely related to malignancy/prior chemo  -Trend    3. Stage-4 Breast cancer:  - Continue Dilaudid PCA, pain controlled; Dr Gonzalez following   - Methadone PO     4. Anxiety   - On Prozac    VTE_ SCds

## 2024-01-05 NOTE — PROGRESS NOTE ADULT - SUBJECTIVE AND OBJECTIVE BOX
39 yo F known patient of Dr Chapa at [facility], metastatic breast ca on Enhertu and with prior GIB requiring blood transfusion is admitted to the hospital again due to symptomatic anemia.  Her hemoglobin was found to be 7.5.  She received 1 unit of PRBC and hemoglobin is 8.2 now.  She also has chronic cancer-related pain noted in her back.  She says that in the emergency room she received Dilaudid 2 mg IV which seem to help but says that as inpatient she received a dose of Dilaudid 1 mg which was not enough and would like to request a higher dose.  She says that last night her bowel movement was very dark and she feels it was likely mixed with blood.   She had a PET-CT and brain MRI done within the past few days which overall showed stable disease. Colitis noted on CT. She has been started on IV antibiotics.     drop in Hgb 7.6  CTE showing gastritis  afebrile  c/o dark stools        MEDICATIONS  (STANDING):  chlorhexidine 2% Cloths 1 Application(s) Topical <User Schedule>  FLUoxetine 80 milliGRAM(s) Oral at bedtime  folic acid 1 milliGRAM(s) Oral daily  hydrocortisone hemorrhoidal Suppository 1 Suppository(s) Rectal at bedtime  HYDROmorphone PCA (1 mG/mL) 30 milliLiter(s) PCA Continuous PCA Continuous  memantine 10 milliGRAM(s) Oral two times a day  methadone    Tablet 10 milliGRAM(s) Oral <User Schedule>  methadone    Tablet 15 milliGRAM(s) Oral at bedtime  multivitamin 1 Tablet(s) Oral daily  naloxegol 25 milliGRAM(s) Oral daily  pantoprazole  Injectable 40 milliGRAM(s) IV Push daily  piperacillin/tazobactam IVPB.. 3.375 Gram(s) IV Intermittent every 8 hours  polyethylene glycol 3350 17 Gram(s) Oral at bedtime  potassium chloride    Tablet ER 40 milliEquivalent(s) Oral once  pregabalin 150 milliGRAM(s) Oral three times a day  sodium chloride 0.9% lock flush 3 milliLiter(s) IV Push every 8 hours    MEDICATIONS  (PRN):  acetaminophen     Tablet .. 650 milliGRAM(s) Oral every 6 hours PRN Temp greater or equal to 38C (100.4F), Mild Pain (1 - 3)  aluminum hydroxide/magnesium hydroxide/simethicone Suspension 30 milliLiter(s) Oral every 4 hours PRN Dyspepsia  glycerin Suppository - Adult 1 Suppository(s) Rectal at bedtime PRN Constipation  LORazepam     Tablet 0.5 milliGRAM(s) Oral two times a day PRN Anxiety  melatonin 3 milliGRAM(s) Oral at bedtime PRN Insomnia  naloxone Injectable 0.1 milliGRAM(s) IV Push every 3 minutes PRN For ANY of the following changes in patient status:  A. RR LESS THAN 10 breaths per minute, B. Oxygen saturation LESS THAN 90%, C. Sedation score of 6  ondansetron Injectable 4 milliGRAM(s) IV Push every 8 hours PRN Nausea and/or Vomiting        ICU Vital Signs Last 24 Hrs  T(C): 36.7 (05 Jan 2024 04:39), Max: 36.9 (04 Jan 2024 10:39)  T(F): 98 (05 Jan 2024 04:39), Max: 98.4 (04 Jan 2024 10:39)  HR: 77 (05 Jan 2024 04:39) (77 - 97)  BP: 95/61 (05 Jan 2024 04:39) (95/61 - 110/71)  BP(mean): --  ABP: --  ABP(mean): --  RR: 18 (05 Jan 2024 04:39) (18 - 18)  SpO2: 99% (05 Jan 2024 04:39) (96% - 99%)    O2 Parameters below as of 04 Jan 2024 10:39  Patient On (Oxygen Delivery Method): room air      Gen - alert and oriented  Heart - s1s2 RRR  Lung - CTA b/l  Abd - mild tender  Ext no edema               Labs:                          7.6    2.80  )-----------( 249      ( 05 Jan 2024 05:51 )             25.3                           9.1    3.70  )-----------( 345      ( 04 Jan 2024 06:16 )             29.3                           8.2    3.34  )-----------( 250      ( 03 Jan 2024 07:28 )             26.8     01-05    140  |  106  |  12.8  ----------------------------<  79  3.4<L>   |  23.0  |  0.45<L>    Ca    8.4      05 Jan 2024 05:51    TPro  5.1<L>  /  Alb  3.0<L>  /  TBili  0.7  /  DBili  x   /  AST  32<H>  /  ALT  14  /  AlkPhos  157<H>  01-05 01-03    142  |  110<H>  |  14.1  ----------------------------<  92  3.5   |  23.0  |  0.48<L>    Ca    8.2<L>      03 Jan 2024 07:28  Phos  3.7     01-03  Mg     1.9     01-03    TPro  5.6<L>  /  Alb  3.4  /  TBili  0.9  /  DBili  x   /  AST  35<H>  /  ALT  16  /  AlkPhos  192<H>  01-02   39 yo F known patient of Dr Chapa at [facility], metastatic breast ca on Enhertu and with prior GIB requiring blood transfusion is admitted to the hospital again due to symptomatic anemia.  Her hemoglobin was found to be 7.5.  She received 1 unit of PRBC and hemoglobin is 8.2 now.  She also has chronic cancer-related pain noted in her back.  She says that in the emergency room she received Dilaudid 2 mg IV which seem to help but says that as inpatient she received a dose of Dilaudid 1 mg which was not enough and would like to request a higher dose.  She says that last night her bowel movement was very dark and she feels it was likely mixed with blood.   She had a PET-CT and brain MRI done within the past few days which overall showed stable disease. Colitis noted on CT. She has been started on IV antibiotics.     drop in Hgb 7.6  CTE showing gastritis  afebrile  c/o dark stools        MEDICATIONS  (STANDING):  chlorhexidine 2% Cloths 1 Application(s) Topical <User Schedule>  FLUoxetine 80 milliGRAM(s) Oral at bedtime  folic acid 1 milliGRAM(s) Oral daily  hydrocortisone hemorrhoidal Suppository 1 Suppository(s) Rectal at bedtime  HYDROmorphone PCA (1 mG/mL) 30 milliLiter(s) PCA Continuous PCA Continuous  memantine 10 milliGRAM(s) Oral two times a day  methadone    Tablet 10 milliGRAM(s) Oral <User Schedule>  methadone    Tablet 15 milliGRAM(s) Oral at bedtime  multivitamin 1 Tablet(s) Oral daily  naloxegol 25 milliGRAM(s) Oral daily  pantoprazole  Injectable 40 milliGRAM(s) IV Push daily  piperacillin/tazobactam IVPB.. 3.375 Gram(s) IV Intermittent every 8 hours  polyethylene glycol 3350 17 Gram(s) Oral at bedtime  potassium chloride    Tablet ER 40 milliEquivalent(s) Oral once  pregabalin 150 milliGRAM(s) Oral three times a day  sodium chloride 0.9% lock flush 3 milliLiter(s) IV Push every 8 hours    MEDICATIONS  (PRN):  acetaminophen     Tablet .. 650 milliGRAM(s) Oral every 6 hours PRN Temp greater or equal to 38C (100.4F), Mild Pain (1 - 3)  aluminum hydroxide/magnesium hydroxide/simethicone Suspension 30 milliLiter(s) Oral every 4 hours PRN Dyspepsia  glycerin Suppository - Adult 1 Suppository(s) Rectal at bedtime PRN Constipation  LORazepam     Tablet 0.5 milliGRAM(s) Oral two times a day PRN Anxiety  melatonin 3 milliGRAM(s) Oral at bedtime PRN Insomnia  naloxone Injectable 0.1 milliGRAM(s) IV Push every 3 minutes PRN For ANY of the following changes in patient status:  A. RR LESS THAN 10 breaths per minute, B. Oxygen saturation LESS THAN 90%, C. Sedation score of 6  ondansetron Injectable 4 milliGRAM(s) IV Push every 8 hours PRN Nausea and/or Vomiting        ICU Vital Signs Last 24 Hrs  T(C): 36.7 (05 Jan 2024 04:39), Max: 36.9 (04 Jan 2024 10:39)  T(F): 98 (05 Jan 2024 04:39), Max: 98.4 (04 Jan 2024 10:39)  HR: 77 (05 Jan 2024 04:39) (77 - 97)  BP: 95/61 (05 Jan 2024 04:39) (95/61 - 110/71)  BP(mean): --  ABP: --  ABP(mean): --  RR: 18 (05 Jan 2024 04:39) (18 - 18)  SpO2: 99% (05 Jan 2024 04:39) (96% - 99%)    O2 Parameters below as of 04 Jan 2024 10:39  Patient On (Oxygen Delivery Method): room air      Gen - alert and oriented  Heart - s1s2 RRR  Lung - CTA b/l  Abd - mild tender  Ext no edema               Labs:                          7.6    2.80  )-----------( 249      ( 05 Jan 2024 05:51 )             25.3                           9.1    3.70  )-----------( 345      ( 04 Jan 2024 06:16 )             29.3                           8.2    3.34  )-----------( 250      ( 03 Jan 2024 07:28 )             26.8     01-05    140  |  106  |  12.8  ----------------------------<  79  3.4<L>   |  23.0  |  0.45<L>    Ca    8.4      05 Jan 2024 05:51    TPro  5.1<L>  /  Alb  3.0<L>  /  TBili  0.7  /  DBili  x   /  AST  32<H>  /  ALT  14  /  AlkPhos  157<H>  01-05 01-03    142  |  110<H>  |  14.1  ----------------------------<  92  3.5   |  23.0  |  0.48<L>    Ca    8.2<L>      03 Jan 2024 07:28  Phos  3.7     01-03  Mg     1.9     01-03    TPro  5.6<L>  /  Alb  3.4  /  TBili  0.9  /  DBili  x   /  AST  35<H>  /  ALT  16  /  AlkPhos  192<H>  01-02

## 2024-01-05 NOTE — PROVIDER CONTACT NOTE (OTHER) - SITUATION
Saurabh from microbiology called unit and made aware patient came back positive for RSV. Luis Eduardo White made aware. Patient is in isolation room.

## 2024-01-05 NOTE — PROGRESS NOTE ADULT - ASSESSMENT
37 y/o female with hx of Stage-4 Breast cancer with diffuse osseous mets, on home methadone for chronic pain  uncontrolled pain on iv dilaudid    dPCA 0/0.3/8/6  change methadone bedtime dose to 10/10/20      utilizing ~2mg/4hr on pca  will transition to PO opioid after gi study on monday 39 y/o female with hx of Stage-4 Breast cancer with diffuse osseous mets, on home methadone for chronic pain  uncontrolled pain on iv dilaudid    dPCA 0/0.3/8/6  change methadone bedtime dose to 10/10/20      utilizing ~2mg/4hr on pca  will transition to PO opioid after gi study on monday

## 2024-01-05 NOTE — PROGRESS NOTE ADULT - ASSESSMENT
39 yo F known patient of Dr Chapa at [facility], metastatic breast ca on Enhertu, GIB who was admitted to the hospital due to severe anemia and GIB.     1) Metastatic Breast ca  as above  on Enhertu. Last dose on 12/20/2023.  PET CT showed stable disease.  f/u with Dr Chapa upon DC    2) Anemia   s/p prbc transfusion.  GI on board  IV abx  cultures negative  s/p CTE showing gastritis  GI recs regarding need for colonoscopy/capsule study  transfuse 1u PRBC tody    3) Leukopenia  recent chemo  keep ANC > 1000  wbc 2.8k  No fever.    4) DVT ppx      will follow 37 yo F known patient of Dr Chapa at [facility], metastatic breast ca on Enhertu, GIB who was admitted to the hospital due to severe anemia and GIB.     1) Metastatic Breast ca  as above  on Enhertu. Last dose on 12/20/2023.  PET CT showed stable disease.  f/u with Dr Chapa upon DC    2) Anemia   s/p prbc transfusion.  GI on board  IV abx  cultures negative  s/p CTE showing gastritis  GI recs regarding need for colonoscopy/capsule study  transfuse 1u PRBC tody    3) Leukopenia  recent chemo  keep ANC > 1000  wbc 2.8k  No fever.    4) DVT ppx      will follow

## 2024-01-05 NOTE — PROGRESS NOTE ADULT - ASSESSMENT
39 y/o female with hx of Stage lV Breast cancer with diffuse osseous mets, prior malignant pericardial effusion, pleural effusion s/p drain, s/p XRT/Chemo last chemo with Enhertu with plan to switch to Keytruda on 11/29 but has been on hold due to recurrent symptomatic anemia requiring multiple transfusions.    GI consulted for symptomatic anemia/dark stool    -Prior endoscopic evaluation:   VCE on 12/11/23 revealed enteritis  Colonoscopy on 11/20 showed internal hemorrhoids  EGD/ colonoscopy (10/2023) at [place] showed antral erythema, colonoscopy poor prep     #symptomatic anemia  - 1/4/2024 CT A/P (CT Enterography): mild concentric wall thickening involving gastric antrum/pylorus, mild mucosal enhancement cecum and ascending colon with minimal pericolic stranding extending along the right paracolic gutter and posterior plank, generalized colonic fecal retention with mild air distended rectum, no bowel obstruction  - Hgb 9.1 --> 7.6; PRBC ordered by primary  - Trend transfuse as needed. Monitor for signs of bleeding  - Avoid NSAIDs  - Continue Pantoprazole  - Patient reported dyspnea on exertion --> RVP/COVID ordered  - EGD tentatively planned for Monday 1/8/2023  - NPO midnight on Sunday    #chronic constipation  - Serial abdominal exams  - Continue bowel regimen while on opioids  - Encourage ambulation as tolerated  - Anusol suppository for internal hemorrhoids    _________________________________________________________________  Assessment and recommendations are final when note is signed by the attending physician.  39 y/o female with hx of Stage lV Breast cancer with diffuse osseous mets, prior malignant pericardial effusion, pleural effusion s/p drain, s/p XRT/Chemo last chemo with Enhertu with plan to switch to Keytruda on 11/29 but has been on hold due to recurrent symptomatic anemia requiring multiple transfusions.    GI consulted for symptomatic anemia/dark stool    -Prior endoscopic evaluation:   VCE on 12/11/23 revealed enteritis  Colonoscopy on 11/20 showed internal hemorrhoids  EGD/ colonoscopy (10/2023) at [place] showed antral erythema, colonoscopy poor prep     #symptomatic anemia  - 1/4/2024 CT A/P (CT Enterography): mild concentric wall thickening involving gastric antrum/pylorus, mild mucosal enhancement cecum and ascending colon with minimal pericolic stranding extending along the right paracolic gutter and posterior plank, generalized colonic fecal retention with mild air distended rectum, no bowel obstruction  - Hgb 9.1 --> 7.6; PRBC ordered by primary  - Trend transfuse as needed. Monitor for signs of bleeding  - Avoid NSAIDs  - Continue Pantoprazole  - Patient reported dyspnea on exertion --> RVP/COVID ordered  - EGD tentatively planned for Monday 1/8/2023  - NPO midnight on Sunday    #chronic constipation  - Serial abdominal exams  - Continue bowel regimen while on opioids  - Encourage ambulation as tolerated  - Anusol suppository for internal hemorrhoids    _________________________________________________________________  Assessment and recommendations are final when note is signed by the attending physician.

## 2024-01-05 NOTE — PROGRESS NOTE ADULT - SUBJECTIVE AND OBJECTIVE BOX
Chief Complaint:  Patient is a 38y old  Female who presents with a chief complaint of Symptomatic anemia (05 Jan 2024 13:14)      Interval HPI/ 24 hr events: Patient seen and examined at bedside. Patient reports feeling well this morning, however she reports intermittent nausea and abdominal cramping. Reports a dark loose bowel movement last night after her CTE. Tolerating diet. Reports adequate pain management with her PCA pump. Denies vomiting, diarrhea, hematemesis, hematochezia, melena. Vitals are overall stable, no leukocytosis, Hgb, LFTs stable.       REVIEW OF SYSTEMS:   General: Negative  HEENT: Negative  CV: Negative  Respiratory: Negative  GI: See HPI  : Negative  MSK: Negative  Hematologic: Negative  Skin: Negative    MEDICATIONS:   MEDICATIONS  (STANDING):  artificial  tears Solution 1 Drop(s) Both EYES two times a day  chlorhexidine 2% Cloths 1 Application(s) Topical <User Schedule>  FLUoxetine 80 milliGRAM(s) Oral at bedtime  folic acid 1 milliGRAM(s) Oral daily  hydrocortisone hemorrhoidal Suppository 1 Suppository(s) Rectal at bedtime  HYDROmorphone PCA (1 mG/mL) 30 milliLiter(s) PCA Continuous PCA Continuous  memantine 10 milliGRAM(s) Oral two times a day  methadone    Tablet 10 milliGRAM(s) Oral <User Schedule>  methadone    Tablet 20 milliGRAM(s) Oral at bedtime  multivitamin 1 Tablet(s) Oral daily  naloxegol 25 milliGRAM(s) Oral daily  pantoprazole  Injectable 40 milliGRAM(s) IV Push every 12 hours  piperacillin/tazobactam IVPB.. 3.375 Gram(s) IV Intermittent every 8 hours  polyethylene glycol 3350 17 Gram(s) Oral at bedtime  pregabalin 150 milliGRAM(s) Oral three times a day  sodium chloride 0.9% lock flush 3 milliLiter(s) IV Push every 8 hours    MEDICATIONS  (PRN):  acetaminophen     Tablet .. 650 milliGRAM(s) Oral every 6 hours PRN Temp greater or equal to 38C (100.4F), Mild Pain (1 - 3)  aluminum hydroxide/magnesium hydroxide/simethicone Suspension 30 milliLiter(s) Oral every 4 hours PRN Dyspepsia  glycerin Suppository - Adult 1 Suppository(s) Rectal at bedtime PRN Constipation  LORazepam     Tablet 0.5 milliGRAM(s) Oral two times a day PRN Anxiety  melatonin 3 milliGRAM(s) Oral at bedtime PRN Insomnia  naloxone Injectable 0.1 milliGRAM(s) IV Push every 3 minutes PRN For ANY of the following changes in patient status:  A. RR LESS THAN 10 breaths per minute, B. Oxygen saturation LESS THAN 90%, C. Sedation score of 6  ondansetron Injectable 4 milliGRAM(s) IV Push every 8 hours PRN Nausea and/or Vomiting      Packed Red Cells Order:  1 Unit  Indication: Symptomatic Anemia - e.g. Chest Pain, Orthostasis, Tach  Infuse Unit : 3 Hours (01-05-24 @ 10:25)  Packed Red Cells Order:  1 Unit  Indication: Anemia due to Active Hemorrhage - Medical Conditions e.  Infuse Unit : 3 Hours (01-02-24 @ 19:43)        DIET:  Diet, Regular (01-04-24 @ 12:43) [Active]          ALLERGIES:   Allergies    Perjeta (Other (Severe))  pertuzumab (Other (Severe))    Intolerances        VITAL SIGNS:   Vital Signs Last 24 Hrs  T(C): 36.7 (05 Jan 2024 04:39), Max: 36.7 (04 Jan 2024 16:36)  T(F): 98 (05 Jan 2024 04:39), Max: 98.1 (04 Jan 2024 16:36)  HR: 77 (05 Jan 2024 04:39) (77 - 97)  BP: 95/61 (05 Jan 2024 04:39) (95/61 - 110/71)  BP(mean): --  RR: 18 (05 Jan 2024 04:39) (18 - 18)  SpO2: 99% (05 Jan 2024 04:39) (97% - 99%)      I&O's Summary      PHYSICAL EXAM:   GENERAL:  No acute distress  HEENT:  NC/AT, conjunctiva clear, sclera anicteric  CHEST:  No increased effort  HEART:  Regular rate  ABDOMEN:  Soft, non-tender, non-distended, normoactive bowel sounds, no rebound or guarding  EXTREMITIES: No edema  SKIN:  Warm, dry  NEURO:  Calm, cooperative    LABS:                        7.6    2.80  )-----------( 249      ( 05 Jan 2024 05:51 )             25.3     Hemoglobin: 7.6 g/dL (01-05-24 @ 05:51)  Hemoglobin: 9.1 g/dL (01-04-24 @ 06:16)  Hemoglobin: 8.2 g/dL (01-03-24 @ 07:28)  Hemoglobin: 7.5 g/dL (01-02-24 @ 18:00)    01-05    140  |  106  |  12.8  ----------------------------<  79  3.4<L>   |  23.0  |  0.45<L>    Ca    8.4      05 Jan 2024 05:51    TPro  5.1<L>  /  Alb  3.0<L>  /  TBili  0.7  /  DBili  x   /  AST  32<H>  /  ALT  14  /  AlkPhos  157<H>  01-05    LIVER FUNCTIONS - ( 05 Jan 2024 05:51 )  Alb: 3.0 g/dL / Pro: 5.1 g/dL / ALK PHOS: 157 U/L / ALT: 14 U/L / AST: 32 U/L / GGT: x                                                     RADIOLOGY & ADDITIONAL STUDIES:      ACC: [id number] EXAM:  CT ENTEROGRAPHY OC IC   ORDERED BY: SAHARA QUAN     PROCEDURE DATE:  01/04/2024          INTERPRETATION:  CLINICAL INFORMATION: Anemia. Metastatic breast cancer.    COMPARISON: CT scan abdomen pelvis 1/3/2024.    CONTRAST/COMPLICATIONS:  IV Contrast: Omnipaque 350  88 cc administered   12 cc discarded  Oral Contrast: VoLumen  Complications: None reported at time of study completion    PROCEDURE:  CT of the Abdomen and Pelvis (CT Enterography) was performed with   intravenous contrast in the late arterial phase.  Sagittal and coronal reformats were performed.    FINDINGS:    LOWER CHEST:  Small, poorly defined groundglass opacities visualized bilateral lung   bases.  Possible trace loculated left pleural fluid.    LIVER: Hepatic steatosis.  BILE DUCTS: Normal caliber.  GALLBLADDER: Layering sludge with probable noncalcified dependent   gallstone.  SPLEEN: Mild splenomegaly.  PANCREAS: Within normal limits.  ADRENALS: Within normal limits.  KIDNEYS/URETERS: Withinnormal limits.    BLADDER: Within normal limits.  REPRODUCTIVE ORGANS:  The uterus and adnexa appear within normal limits.    BOWEL:  There is mild concentric wall thickening involving the gastric   antrum/pylorus.  No discrete ulcer is noted.  Thereis mild mucosal hyperenhancement cecum and ascending colon with   minimal pericolic stranding extending along the right paracolic gutter   and posterior flank.  There is generalized colonic fecal retention with mild air distended   rectum.  No bowel obstruction.  PERITONEUM: Trace free fluid pelvis.    VESSELS: Within normal limits.  RETROPERITONEUM/LYMPH NODES: No lymphadenopathy.    ABDOMINAL WALL: Within normal limits.    BONES:  Osteoblastic and osteolytic metastatic disease, stable.  T12 andL1 vertebroplasty.  Spondylolysis L5.    IMPRESSION:    Concentric gastric antrum/pylorus thickening is suggestive of gastritis.    Mucosal hyperenhancement with mild pericolic stranding right colon may   reflect nonspecific colitis.    Other findings as discussed above.    --- End of Report ---            YVONNE HUITRON MD; Attending Radiologist  This document has been electronically signed. Jan 4 2024  3:37PM  01-04-24 @ 12:29    ACC: [id number] EXAM:  CT ABDOMEN AND PELVIS IC   ORDERED BY: LEXX MICHAEL     PROCEDURE DATE:  01/03/2024          INTERPRETATION:  CLINICAL INFORMATION: Abdominal pain and anemia.    COMPARISON: CT abdomen pelvis 12/27/2023    CONTRAST/COMPLICATIONS:  IV Contrast: Omnipaque 350  80 cc administered   20 cc discarded  Oral Contrast: NONE  Complications: None reported at time of study completion    PROCEDURE:  CT of the Abdomen and Pelvis was performed.  Sagittal and coronal reformats were performed.    FINDINGS:  LOWER CHEST: Central venous catheter in the right atrium.    LIVER: Within normal limits.  BILE DUCTS: Normal caliber.  GALLBLADDER: Within normal limits.  SPLEEN: Splenomegaly.  PANCREAS: Within normal limits.  ADRENALS: Within normal limits.  KIDNEYS/URETERS: Within normal limits.    BLADDER: Within normal limits.  REPRODUCTIVE ORGANS: Uterus and adnexa within normal limits.    BOWEL: No bowel obstruction. Wall thickening of the ascending and   rectosigmoid colon which may be due to underdistention. Moderate stool in   the remainder of the colon.  PERITONEUM: Trace pelvic ascites.  VESSELS: Within normal limits.  RETROPERITONEUM/LYMPH NODES: No lymphadenopathy. No retroperitoneal   hematoma.  ABDOMINAL WALL: Within normal limits.  BONES: Redemonstrated mixed sclerotic and lytic osseous metastatic   disease. T12 and L1 vertebral cement.    IMPRESSION:  No retroperitoneal hematoma.    Wall thickening of the ascending and rectosigmoid colon which may be due   to underdistention. Moderate stool in the remainder of the colon.    Trace ascites.      --- End of Report ---            SHAWN KOROMA MD; Attending Radiologist  This document has been electronically signed. Cristhian  3 2024  8:51PM  01-03-24 @ 17:19       Chief Complaint:  Patient is a 38y old  Female who presents with a chief complaint of Symptomatic anemia (05 Jan 2024 13:14)      Interval HPI/ 24 hr events: Patient seen and examined at bedside. Patient reports feeling well this morning, however she reports intermittent nausea and abdominal cramping. Reports a dark loose bowel movement last night after her CTE. Tolerating diet. Reports adequate pain management with her PCA pump. Denies vomiting, diarrhea, hematemesis, hematochezia, melena. Vitals are overall stable, no leukocytosis, Hgb, LFTs stable.       REVIEW OF SYSTEMS:   General: Negative  HEENT: Negative  CV: Negative  Respiratory: Negative  GI: See HPI  : Negative  MSK: Negative  Hematologic: Negative  Skin: Negative    MEDICATIONS:   MEDICATIONS  (STANDING):  artificial  tears Solution 1 Drop(s) Both EYES two times a day  chlorhexidine 2% Cloths 1 Application(s) Topical <User Schedule>  FLUoxetine 80 milliGRAM(s) Oral at bedtime  folic acid 1 milliGRAM(s) Oral daily  hydrocortisone hemorrhoidal Suppository 1 Suppository(s) Rectal at bedtime  HYDROmorphone PCA (1 mG/mL) 30 milliLiter(s) PCA Continuous PCA Continuous  memantine 10 milliGRAM(s) Oral two times a day  methadone    Tablet 10 milliGRAM(s) Oral <User Schedule>  methadone    Tablet 20 milliGRAM(s) Oral at bedtime  multivitamin 1 Tablet(s) Oral daily  naloxegol 25 milliGRAM(s) Oral daily  pantoprazole  Injectable 40 milliGRAM(s) IV Push every 12 hours  piperacillin/tazobactam IVPB.. 3.375 Gram(s) IV Intermittent every 8 hours  polyethylene glycol 3350 17 Gram(s) Oral at bedtime  pregabalin 150 milliGRAM(s) Oral three times a day  sodium chloride 0.9% lock flush 3 milliLiter(s) IV Push every 8 hours    MEDICATIONS  (PRN):  acetaminophen     Tablet .. 650 milliGRAM(s) Oral every 6 hours PRN Temp greater or equal to 38C (100.4F), Mild Pain (1 - 3)  aluminum hydroxide/magnesium hydroxide/simethicone Suspension 30 milliLiter(s) Oral every 4 hours PRN Dyspepsia  glycerin Suppository - Adult 1 Suppository(s) Rectal at bedtime PRN Constipation  LORazepam     Tablet 0.5 milliGRAM(s) Oral two times a day PRN Anxiety  melatonin 3 milliGRAM(s) Oral at bedtime PRN Insomnia  naloxone Injectable 0.1 milliGRAM(s) IV Push every 3 minutes PRN For ANY of the following changes in patient status:  A. RR LESS THAN 10 breaths per minute, B. Oxygen saturation LESS THAN 90%, C. Sedation score of 6  ondansetron Injectable 4 milliGRAM(s) IV Push every 8 hours PRN Nausea and/or Vomiting      Packed Red Cells Order:  1 Unit  Indication: Symptomatic Anemia - e.g. Chest Pain, Orthostasis, Tach  Infuse Unit : 3 Hours (01-05-24 @ 10:25)  Packed Red Cells Order:  1 Unit  Indication: Anemia due to Active Hemorrhage - Medical Conditions e.  Infuse Unit : 3 Hours (01-02-24 @ 19:43)        DIET:  Diet, Regular (01-04-24 @ 12:43) [Active]          ALLERGIES:   Allergies    Perjeta (Other (Severe))  pertuzumab (Other (Severe))    Intolerances        VITAL SIGNS:   Vital Signs Last 24 Hrs  T(C): 36.7 (05 Jan 2024 04:39), Max: 36.7 (04 Jan 2024 16:36)  T(F): 98 (05 Jan 2024 04:39), Max: 98.1 (04 Jan 2024 16:36)  HR: 77 (05 Jan 2024 04:39) (77 - 97)  BP: 95/61 (05 Jan 2024 04:39) (95/61 - 110/71)  BP(mean): --  RR: 18 (05 Jan 2024 04:39) (18 - 18)  SpO2: 99% (05 Jan 2024 04:39) (97% - 99%)      I&O's Summary      PHYSICAL EXAM:   GENERAL:  No acute distress  HEENT:  NC/AT, conjunctiva clear, sclera anicteric  CHEST:  No increased effort  HEART:  Regular rate  ABDOMEN:  Soft, non-tender, non-distended, normoactive bowel sounds, no rebound or guarding  EXTREMITIES: No edema  SKIN:  Warm, dry  NEURO:  Calm, cooperative    LABS:                        7.6    2.80  )-----------( 249      ( 05 Jan 2024 05:51 )             25.3     Hemoglobin: 7.6 g/dL (01-05-24 @ 05:51)  Hemoglobin: 9.1 g/dL (01-04-24 @ 06:16)  Hemoglobin: 8.2 g/dL (01-03-24 @ 07:28)  Hemoglobin: 7.5 g/dL (01-02-24 @ 18:00)    01-05    140  |  106  |  12.8  ----------------------------<  79  3.4<L>   |  23.0  |  0.45<L>    Ca    8.4      05 Jan 2024 05:51    TPro  5.1<L>  /  Alb  3.0<L>  /  TBili  0.7  /  DBili  x   /  AST  32<H>  /  ALT  14  /  AlkPhos  157<H>  01-05    LIVER FUNCTIONS - ( 05 Jan 2024 05:51 )  Alb: 3.0 g/dL / Pro: 5.1 g/dL / ALK PHOS: 157 U/L / ALT: 14 U/L / AST: 32 U/L / GGT: x                                                     RADIOLOGY & ADDITIONAL STUDIES:      ACC: [id number] EXAM:  CT ENTEROGRAPHY OC IC   ORDERED BY: SAHARA QUAN     PROCEDURE DATE:  01/04/2024          INTERPRETATION:  CLINICAL INFORMATION: Anemia. Metastatic breast cancer.    COMPARISON: CT scan abdomen pelvis 1/3/2024.    CONTRAST/COMPLICATIONS:  IV Contrast: Omnipaque 350  88 cc administered   12 cc discarded  Oral Contrast: VoLumen  Complications: None reported at time of study completion    PROCEDURE:  CT of the Abdomen and Pelvis (CT Enterography) was performed with   intravenous contrast in the late arterial phase.  Sagittal and coronal reformats were performed.    FINDINGS:    LOWER CHEST:  Small, poorly defined groundglass opacities visualized bilateral lung   bases.  Possible trace loculated left pleural fluid.    LIVER: Hepatic steatosis.  BILE DUCTS: Normal caliber.  GALLBLADDER: Layering sludge with probable noncalcified dependent   gallstone.  SPLEEN: Mild splenomegaly.  PANCREAS: Within normal limits.  ADRENALS: Within normal limits.  KIDNEYS/URETERS: Withinnormal limits.    BLADDER: Within normal limits.  REPRODUCTIVE ORGANS:  The uterus and adnexa appear within normal limits.    BOWEL:  There is mild concentric wall thickening involving the gastric   antrum/pylorus.  No discrete ulcer is noted.  Thereis mild mucosal hyperenhancement cecum and ascending colon with   minimal pericolic stranding extending along the right paracolic gutter   and posterior flank.  There is generalized colonic fecal retention with mild air distended   rectum.  No bowel obstruction.  PERITONEUM: Trace free fluid pelvis.    VESSELS: Within normal limits.  RETROPERITONEUM/LYMPH NODES: No lymphadenopathy.    ABDOMINAL WALL: Within normal limits.    BONES:  Osteoblastic and osteolytic metastatic disease, stable.  T12 andL1 vertebroplasty.  Spondylolysis L5.    IMPRESSION:    Concentric gastric antrum/pylorus thickening is suggestive of gastritis.    Mucosal hyperenhancement with mild pericolic stranding right colon may   reflect nonspecific colitis.    Other findings as discussed above.    --- End of Report ---            YVONNE HUITRON MD; Attending Radiologist  This document has been electronically signed. Jan 4 2024  3:37PM  01-04-24 @ 12:29    ACC: [id number] EXAM:  CT ABDOMEN AND PELVIS IC   ORDERED BY: LEXX MICHAEL     PROCEDURE DATE:  01/03/2024          INTERPRETATION:  CLINICAL INFORMATION: Abdominal pain and anemia.    COMPARISON: CT abdomen pelvis 12/27/2023    CONTRAST/COMPLICATIONS:  IV Contrast: Omnipaque 350  80 cc administered   20 cc discarded  Oral Contrast: NONE  Complications: None reported at time of study completion    PROCEDURE:  CT of the Abdomen and Pelvis was performed.  Sagittal and coronal reformats were performed.    FINDINGS:  LOWER CHEST: Central venous catheter in the right atrium.    LIVER: Within normal limits.  BILE DUCTS: Normal caliber.  GALLBLADDER: Within normal limits.  SPLEEN: Splenomegaly.  PANCREAS: Within normal limits.  ADRENALS: Within normal limits.  KIDNEYS/URETERS: Within normal limits.    BLADDER: Within normal limits.  REPRODUCTIVE ORGANS: Uterus and adnexa within normal limits.    BOWEL: No bowel obstruction. Wall thickening of the ascending and   rectosigmoid colon which may be due to underdistention. Moderate stool in   the remainder of the colon.  PERITONEUM: Trace pelvic ascites.  VESSELS: Within normal limits.  RETROPERITONEUM/LYMPH NODES: No lymphadenopathy. No retroperitoneal   hematoma.  ABDOMINAL WALL: Within normal limits.  BONES: Redemonstrated mixed sclerotic and lytic osseous metastatic   disease. T12 and L1 vertebral cement.    IMPRESSION:  No retroperitoneal hematoma.    Wall thickening of the ascending and rectosigmoid colon which may be due   to underdistention. Moderate stool in the remainder of the colon.    Trace ascites.      --- End of Report ---            SHAWN KOROMA MD; Attending Radiologist  This document has been electronically signed. Cristhian  3 2024  8:51PM  01-03-24 @ 17:19       Chief Complaint:  Patient is a 38y old  Female who presents with a chief complaint of Symptomatic anemia (05 Jan 2024 13:14)      Interval HPI/ 24 hr events: Patient seen and examined at bedside. Patient reports feeling well this morning, however she reports intermittent nausea and abdominal cramping. Reports a dark loose bowel movement last night after her CTE. Tolerating diet. Reports adequate pain management with her PCA pump. Dyspneic on exertion. Denies chest pain, fever, chills, vomiting, hematemesis, hematochezia. Vitals are overall stable, no leukocytosis, Hgb drop from 9.1 to 7.6.    REVIEW OF SYSTEMS:   General: Negative  HEENT: Negative  CV: Negative  Respiratory: Negative  GI: See HPI  : Negative  MSK: Negative  Hematologic: Negative  Skin: Negative    MEDICATIONS:   MEDICATIONS  (STANDING):  artificial  tears Solution 1 Drop(s) Both EYES two times a day  chlorhexidine 2% Cloths 1 Application(s) Topical <User Schedule>  FLUoxetine 80 milliGRAM(s) Oral at bedtime  folic acid 1 milliGRAM(s) Oral daily  hydrocortisone hemorrhoidal Suppository 1 Suppository(s) Rectal at bedtime  HYDROmorphone PCA (1 mG/mL) 30 milliLiter(s) PCA Continuous PCA Continuous  memantine 10 milliGRAM(s) Oral two times a day  methadone    Tablet 10 milliGRAM(s) Oral <User Schedule>  methadone    Tablet 20 milliGRAM(s) Oral at bedtime  multivitamin 1 Tablet(s) Oral daily  naloxegol 25 milliGRAM(s) Oral daily  pantoprazole  Injectable 40 milliGRAM(s) IV Push every 12 hours  piperacillin/tazobactam IVPB.. 3.375 Gram(s) IV Intermittent every 8 hours  polyethylene glycol 3350 17 Gram(s) Oral at bedtime  pregabalin 150 milliGRAM(s) Oral three times a day  sodium chloride 0.9% lock flush 3 milliLiter(s) IV Push every 8 hours    MEDICATIONS  (PRN):  acetaminophen     Tablet .. 650 milliGRAM(s) Oral every 6 hours PRN Temp greater or equal to 38C (100.4F), Mild Pain (1 - 3)  aluminum hydroxide/magnesium hydroxide/simethicone Suspension 30 milliLiter(s) Oral every 4 hours PRN Dyspepsia  glycerin Suppository - Adult 1 Suppository(s) Rectal at bedtime PRN Constipation  LORazepam     Tablet 0.5 milliGRAM(s) Oral two times a day PRN Anxiety  melatonin 3 milliGRAM(s) Oral at bedtime PRN Insomnia  naloxone Injectable 0.1 milliGRAM(s) IV Push every 3 minutes PRN For ANY of the following changes in patient status:  A. RR LESS THAN 10 breaths per minute, B. Oxygen saturation LESS THAN 90%, C. Sedation score of 6  ondansetron Injectable 4 milliGRAM(s) IV Push every 8 hours PRN Nausea and/or Vomiting      Packed Red Cells Order:  1 Unit  Indication: Symptomatic Anemia - e.g. Chest Pain, Orthostasis, Tach  Infuse Unit : 3 Hours (01-05-24 @ 10:25)  Packed Red Cells Order:  1 Unit  Indication: Anemia due to Active Hemorrhage - Medical Conditions e.  Infuse Unit : 3 Hours (01-02-24 @ 19:43)        DIET:  Diet, Regular (01-04-24 @ 12:43) [Active]          ALLERGIES:   Allergies    Perjeta (Other (Severe))  pertuzumab (Other (Severe))    Intolerances        VITAL SIGNS:   Vital Signs Last 24 Hrs  T(C): 36.7 (05 Jan 2024 04:39), Max: 36.7 (04 Jan 2024 16:36)  T(F): 98 (05 Jan 2024 04:39), Max: 98.1 (04 Jan 2024 16:36)  HR: 77 (05 Jan 2024 04:39) (77 - 97)  BP: 95/61 (05 Jan 2024 04:39) (95/61 - 110/71)  BP(mean): --  RR: 18 (05 Jan 2024 04:39) (18 - 18)  SpO2: 99% (05 Jan 2024 04:39) (97% - 99%)      I&O's Summary      PHYSICAL EXAM:   GENERAL:  No acute distress  HEENT:  NC/AT, conjunctiva clear, sclera anicteric  CHEST:  No increased effort  HEART:  Regular rate  ABDOMEN:  Soft, + tender to palpation LLQ, non-distended, normoactive bowel sounds, no rebound or guarding  EXTREMITIES: No edema  SKIN:  Warm, dry  NEURO:  Calm, cooperative    LABS:                        7.6    2.80  )-----------( 249      ( 05 Jan 2024 05:51 )             25.3     Hemoglobin: 7.6 g/dL (01-05-24 @ 05:51)  Hemoglobin: 9.1 g/dL (01-04-24 @ 06:16)  Hemoglobin: 8.2 g/dL (01-03-24 @ 07:28)  Hemoglobin: 7.5 g/dL (01-02-24 @ 18:00)    01-05    140  |  106  |  12.8  ----------------------------<  79  3.4<L>   |  23.0  |  0.45<L>    Ca    8.4      05 Jan 2024 05:51    TPro  5.1<L>  /  Alb  3.0<L>  /  TBili  0.7  /  DBili  x   /  AST  32<H>  /  ALT  14  /  AlkPhos  157<H>  01-05    LIVER FUNCTIONS - ( 05 Jan 2024 05:51 )  Alb: 3.0 g/dL / Pro: 5.1 g/dL / ALK PHOS: 157 U/L / ALT: 14 U/L / AST: 32 U/L / GGT: x           RADIOLOGY & ADDITIONAL STUDIES:      ACC: [id number] EXAM:  CT ENTEROGRAPHY OC IC   ORDERED BY: SAHARA QUAN     PROCEDURE DATE:  01/04/2024        INTERPRETATION:  CLINICAL INFORMATION: Anemia. Metastatic breast cancer.    COMPARISON: CT scan abdomen pelvis 1/3/2024.    CONTRAST/COMPLICATIONS:  IV Contrast: Omnipaque 350  88 cc administered   12 cc discarded  Oral Contrast: VoLumen  Complications: None reported at time of study completion    PROCEDURE:  CT of the Abdomen and Pelvis (CT Enterography) was performed with   intravenous contrast in the late arterial phase.  Sagittal and coronal reformats were performed.    FINDINGS:    LOWER CHEST:  Small, poorly defined ground glass opacities visualized bilateral lung   bases.  Possible trace loculated left pleural fluid.    LIVER: Hepatic steatosis.  BILE DUCTS: Normal caliber.  GALLBLADDER: Layering sludge with probable noncalcified dependent   gallstone.  SPLEEN: Mild splenomegaly.  PANCREAS: Within normal limits.  ADRENALS: Within normal limits.  KIDNEYS/URETERS: Within normal limits.    BLADDER: Within normal limits.  REPRODUCTIVE ORGANS:  The uterus and adnexa appear within normal limits.    BOWEL:  There is mild concentric wall thickening involving the gastric   antrum/pylorus.  No discrete ulcer is noted.  There is mild mucosal hyper enhancement cecum and ascending colon with   minimal pericolic stranding extending along the right paracolic gutter   and posterior flank.  There is generalized colonic fecal retention with mild air distended   rectum.  No bowel obstruction.  PERITONEUM: Trace free fluid pelvis.    VESSELS: Within normal limits.  RETROPERITONEUM/LYMPH NODES: No lymphadenopathy.    ABDOMINAL WALL: Within normal limits.    BONES:  Osteoblastic and osteolytic metastatic disease, stable.  T12 andL1 vertebroplasty.  Spondylolysis L5.    IMPRESSION:    Concentric gastric antrum/pylorus thickening is suggestive of gastritis.    Mucosal hyper enhancement with mild pericolic stranding right colon may   reflect nonspecific colitis.    Other findings as discussed above.    --- End of Report ---      YVONNE HUITRON MD; Attending Radiologist  This document has been electronically signed. Jan 4 2024  3:37PM  01-04-24 @ 12:29    ACC: [id number] EXAM:  CT ABDOMEN AND PELVIS IC   ORDERED BY: LEXX MICHAEL     PROCEDURE DATE:  01/03/2024          INTERPRETATION:  CLINICAL INFORMATION: Abdominal pain and anemia.    COMPARISON: CT abdomen pelvis 12/27/2023    CONTRAST/COMPLICATIONS:  IV Contrast: Omnipaque 350  80 cc administered   20 cc discarded  Oral Contrast: NONE  Complications: None reported at time of study completion    PROCEDURE:  CT of the Abdomen and Pelvis was performed.  Sagittal and coronal reformats were performed.    FINDINGS:  LOWER CHEST: Central venous catheter in the right atrium.    LIVER: Within normal limits.  BILE DUCTS: Normal caliber.  GALLBLADDER: Within normal limits.  SPLEEN: Splenomegaly.  PANCREAS: Within normal limits.  ADRENALS: Within normal limits.  KIDNEYS/URETERS: Within normal limits.    BLADDER: Within normal limits.  REPRODUCTIVE ORGANS: Uterus and adnexa within normal limits.    BOWEL: No bowel obstruction. Wall thickening of the ascending and   rectosigmoid colon which may be due to underdistention. Moderate stool in   the remainder of the colon.  PERITONEUM: Trace pelvic ascites.  VESSELS: Within normal limits.  RETROPERITONEUM/LYMPH NODES: No lymphadenopathy. No retroperitoneal   hematoma.  ABDOMINAL WALL: Within normal limits.  BONES: Re-demonstrated mixed sclerotic and lytic osseous metastatic   disease. T12 and L1 vertebral cement.    IMPRESSION:  No retroperitoneal hematoma.    Wall thickening of the ascending and rectosigmoid colon which may be due   to underdistention. Moderate stool in the remainder of the colon.    Trace ascites.      --- End of Report ---        SHAWN KOROMA MD; Attending Radiologist  This document has been electronically signed. Cristhian  3 2024  8:51PM  01-03-24 @ 17:19       Chief Complaint:  Patient is a 38y old  Female who presents with a chief complaint of Symptomatic anemia (05 Jan 2024 13:14)      Interval HPI/ 24 hr events: Patient seen and examined at bedside. Patient reports feeling well this morning, however she reports intermittent nausea and abdominal cramping. Reports a dark loose bowel movement last night after her CTE. Tolerating diet. Reports adequate pain management with her PCA pump. Dyspneic on exertion. Denies chest pain, fever, chills, vomiting, hematemesis, hematochezia. Vitals are overall stable, no leukocytosis, Hgb drop from 9.1 to 7.6.    REVIEW OF SYSTEMS:   General: Negative  HEENT: Negative  CV: Negative  Respiratory: Negative  GI: See HPI  : Negative  MSK: Negative  Hematologic: Negative  Skin: Negative    MEDICATIONS:   MEDICATIONS  (STANDING):  artificial  tears Solution 1 Drop(s) Both EYES two times a day  chlorhexidine 2% Cloths 1 Application(s) Topical <User Schedule>  FLUoxetine 80 milliGRAM(s) Oral at bedtime  folic acid 1 milliGRAM(s) Oral daily  hydrocortisone hemorrhoidal Suppository 1 Suppository(s) Rectal at bedtime  HYDROmorphone PCA (1 mG/mL) 30 milliLiter(s) PCA Continuous PCA Continuous  memantine 10 milliGRAM(s) Oral two times a day  methadone    Tablet 10 milliGRAM(s) Oral <User Schedule>  methadone    Tablet 20 milliGRAM(s) Oral at bedtime  multivitamin 1 Tablet(s) Oral daily  naloxegol 25 milliGRAM(s) Oral daily  pantoprazole  Injectable 40 milliGRAM(s) IV Push every 12 hours  piperacillin/tazobactam IVPB.. 3.375 Gram(s) IV Intermittent every 8 hours  polyethylene glycol 3350 17 Gram(s) Oral at bedtime  pregabalin 150 milliGRAM(s) Oral three times a day  sodium chloride 0.9% lock flush 3 milliLiter(s) IV Push every 8 hours    MEDICATIONS  (PRN):  acetaminophen     Tablet .. 650 milliGRAM(s) Oral every 6 hours PRN Temp greater or equal to 38C (100.4F), Mild Pain (1 - 3)  aluminum hydroxide/magnesium hydroxide/simethicone Suspension 30 milliLiter(s) Oral every 4 hours PRN Dyspepsia  glycerin Suppository - Adult 1 Suppository(s) Rectal at bedtime PRN Constipation  LORazepam     Tablet 0.5 milliGRAM(s) Oral two times a day PRN Anxiety  melatonin 3 milliGRAM(s) Oral at bedtime PRN Insomnia  naloxone Injectable 0.1 milliGRAM(s) IV Push every 3 minutes PRN For ANY of the following changes in patient status:  A. RR LESS THAN 10 breaths per minute, B. Oxygen saturation LESS THAN 90%, C. Sedation score of 6  ondansetron Injectable 4 milliGRAM(s) IV Push every 8 hours PRN Nausea and/or Vomiting      Packed Red Cells Order:  1 Unit  Indication: Symptomatic Anemia - e.g. Chest Pain, Orthostasis, Tach  Infuse Unit : 3 Hours (01-05-24 @ 10:25)  Packed Red Cells Order:  1 Unit  Indication: Anemia due to Active Hemorrhage - Medical Conditions e.  Infuse Unit : 3 Hours (01-02-24 @ 19:43)        DIET:  Diet, Regular (01-04-24 @ 12:43) [Active]          ALLERGIES:   Allergies    Perjeta (Other (Severe))  pertuzumab (Other (Severe))    Intolerances        VITAL SIGNS:   Vital Signs Last 24 Hrs  T(C): 36.7 (05 Jan 2024 04:39), Max: 36.7 (04 Jan 2024 16:36)  T(F): 98 (05 Jan 2024 04:39), Max: 98.1 (04 Jan 2024 16:36)  HR: 77 (05 Jan 2024 04:39) (77 - 97)  BP: 95/61 (05 Jan 2024 04:39) (95/61 - 110/71)  BP(mean): --  RR: 18 (05 Jan 2024 04:39) (18 - 18)  SpO2: 99% (05 Jan 2024 04:39) (97% - 99%)      I&O's Summary      PHYSICAL EXAM:   GENERAL:  No acute distress  HEENT:  NC/AT, conjunctiva clear, sclera anicteric  CHEST:  No increased effort  HEART:  Regular rate  ABDOMEN:  Soft, + tender to palpation LLQ, non-distended, normoactive bowel sounds, no rebound or guarding  EXTREMITIES: No edema  SKIN:  Warm, dry  NEURO:  Calm, cooperative    LABS:                        7.6    2.80  )-----------( 249      ( 05 Jan 2024 05:51 )             25.3     Hemoglobin: 7.6 g/dL (01-05-24 @ 05:51)  Hemoglobin: 9.1 g/dL (01-04-24 @ 06:16)  Hemoglobin: 8.2 g/dL (01-03-24 @ 07:28)  Hemoglobin: 7.5 g/dL (01-02-24 @ 18:00)    01-05    140  |  106  |  12.8  ----------------------------<  79  3.4<L>   |  23.0  |  0.45<L>    Ca    8.4      05 Jan 2024 05:51    TPro  5.1<L>  /  Alb  3.0<L>  /  TBili  0.7  /  DBili  x   /  AST  32<H>  /  ALT  14  /  AlkPhos  157<H>  01-05    LIVER FUNCTIONS - ( 05 Jan 2024 05:51 )  Alb: 3.0 g/dL / Pro: 5.1 g/dL / ALK PHOS: 157 U/L / ALT: 14 U/L / AST: 32 U/L / GGT: x           RADIOLOGY & ADDITIONAL STUDIES:      ACC: [id number] EXAM:  CT ENTEROGRAPHY OC IC   ORDERED BY: SAHARA QUAN     PROCEDURE DATE:  01/04/2024        INTERPRETATION:  CLINICAL INFORMATION: Anemia. Metastatic breast cancer.    COMPARISON: CT scan abdomen pelvis 1/3/2024.    CONTRAST/COMPLICATIONS:  IV Contrast: Omnipaque 350  88 cc administered   12 cc discarded  Oral Contrast: VoLumen  Complications: None reported at time of study completion    PROCEDURE:  CT of the Abdomen and Pelvis (CT Enterography) was performed with   intravenous contrast in the late arterial phase.  Sagittal and coronal reformats were performed.    FINDINGS:    LOWER CHEST:  Small, poorly defined ground glass opacities visualized bilateral lung   bases.  Possible trace loculated left pleural fluid.    LIVER: Hepatic steatosis.  BILE DUCTS: Normal caliber.  GALLBLADDER: Layering sludge with probable noncalcified dependent   gallstone.  SPLEEN: Mild splenomegaly.  PANCREAS: Within normal limits.  ADRENALS: Within normal limits.  KIDNEYS/URETERS: Within normal limits.    BLADDER: Within normal limits.  REPRODUCTIVE ORGANS:  The uterus and adnexa appear within normal limits.    BOWEL:  There is mild concentric wall thickening involving the gastric   antrum/pylorus.  No discrete ulcer is noted.  There is mild mucosal hyper enhancement cecum and ascending colon with   minimal pericolic stranding extending along the right paracolic gutter   and posterior flank.  There is generalized colonic fecal retention with mild air distended   rectum.  No bowel obstruction.  PERITONEUM: Trace free fluid pelvis.    VESSELS: Within normal limits.  RETROPERITONEUM/LYMPH NODES: No lymphadenopathy.    ABDOMINAL WALL: Within normal limits.    BONES:  Osteoblastic and osteolytic metastatic disease, stable.  T12 andL1 vertebroplasty.  Spondylolysis L5.    IMPRESSION:    Concentric gastric antrum/pylorus thickening is suggestive of gastritis.    Mucosal hyper enhancement with mild pericolic stranding right colon may   reflect nonspecific colitis.    Other findings as discussed above.    --- End of Report ---      YVONNE HUITRON MD; Attending Radiologist  This document has been electronically signed. Jan 4 2024  3:37PM  01-04-24 @ 12:29    ACC: [id number] EXAM:  CT ABDOMEN AND PELVIS IC   ORDERED BY: LEXX MICHAEL     PROCEDURE DATE:  01/03/2024          INTERPRETATION:  CLINICAL INFORMATION: Abdominal pain and anemia.    COMPARISON: CT abdomen pelvis 12/27/2023    CONTRAST/COMPLICATIONS:  IV Contrast: Omnipaque 350  80 cc administered   20 cc discarded  Oral Contrast: NONE  Complications: None reported at time of study completion    PROCEDURE:  CT of the Abdomen and Pelvis was performed.  Sagittal and coronal reformats were performed.    FINDINGS:  LOWER CHEST: Central venous catheter in the right atrium.    LIVER: Within normal limits.  BILE DUCTS: Normal caliber.  GALLBLADDER: Within normal limits.  SPLEEN: Splenomegaly.  PANCREAS: Within normal limits.  ADRENALS: Within normal limits.  KIDNEYS/URETERS: Within normal limits.    BLADDER: Within normal limits.  REPRODUCTIVE ORGANS: Uterus and adnexa within normal limits.    BOWEL: No bowel obstruction. Wall thickening of the ascending and   rectosigmoid colon which may be due to underdistention. Moderate stool in   the remainder of the colon.  PERITONEUM: Trace pelvic ascites.  VESSELS: Within normal limits.  RETROPERITONEUM/LYMPH NODES: No lymphadenopathy. No retroperitoneal   hematoma.  ABDOMINAL WALL: Within normal limits.  BONES: Re-demonstrated mixed sclerotic and lytic osseous metastatic   disease. T12 and L1 vertebral cement.    IMPRESSION:  No retroperitoneal hematoma.    Wall thickening of the ascending and rectosigmoid colon which may be due   to underdistention. Moderate stool in the remainder of the colon.    Trace ascites.      --- End of Report ---        SHAWN KOROMA MD; Attending Radiologist  This document has been electronically signed. Cristhian  3 2024  8:51PM  01-03-24 @ 17:19

## 2024-01-05 NOTE — PROGRESS NOTE ADULT - NS ATTEND AMEND GEN_ALL_CORE FT
I agree with assessment and plan as above. Pt is a 39 yo F with metastatic breast cancer with diffuse osseous mets, prior malignant pericardial effusion, pleural effusion s/p drain, s/p chemo and radiation, chemo now recurrent symptomatic anemia requiring transfusion. Patient reports multiple dark stools over the past two days associated with intermittent severe cramping abdominal pain. Labs significant for downtrending Hgb. Recent GI work up as outlined above, last EGD was in 10/2023 at [place] with antral erythema. Recent VCE showed severe enteritis, CTE showed ground glass opacities, trace L pleural effusion, mild antral thickening and mild inflammation in R colon. Continue PPI and supportive care. Diet as tolerated and keep NPO at midnight on Sunday for EGD on Monday. Trend Hgb daily, transfuse as needed. I agree with assessment and plan as above. Pt is a 39 yo F with metastatic breast cancer with diffuse osseous mets, prior malignant pericardial effusion, pleural effusion s/p drain, s/p chemo and radiation, chemo now recurrent symptomatic anemia requiring transfusion. Patient reports multiple dark stools over the past two days associated with intermittent severe cramping abdominal pain. Labs significant for downtrending Hgb. Recent GI work up as outlined above, last EGD was in 10/2023 at [place] with antral erythema. Recent VCE showed severe enteritis, CTE showed ground glass opacities, trace L pleural effusion, mild antral thickening and mild inflammation in R colon. Continue PPI and supportive care. Diet as tolerated and keep NPO at midnight on Sunday for EGD on Monday. Trend Hgb daily, transfuse as needed.

## 2024-01-05 NOTE — PROGRESS NOTE ADULT - SUBJECTIVE AND OBJECTIVE BOX
CC: Follow up      INTERVAL HPI/OVERNIGHT EVENTS: Patient seen and examined, continue epigastric pain with 2 episodes of melena with some blood yesterday. Complaints of dizziness with ambulation      Vital Signs Last 24 Hrs  T(C): 36.7 (05 Jan 2024 04:39), Max: 36.7 (04 Jan 2024 16:36)  T(F): 98 (05 Jan 2024 04:39), Max: 98.1 (04 Jan 2024 16:36)  HR: 77 (05 Jan 2024 04:39) (77 - 97)  BP: 95/61 (05 Jan 2024 04:39) (95/61 - 110/71)  BP(mean): --  RR: 18 (05 Jan 2024 04:39) (18 - 18)  SpO2: 99% (05 Jan 2024 04:39) (97% - 99%)        PHYSICAL EXAM:    GENERAL: NAD, AOX3  HEAD:  Atraumatic, Normocephalic  EYES:, conjunctiva and sclera clear  ENMT: Moist mucous membranes  NECK: Supple  CHEST/LUNG: Clear to auscultation bilaterally; No rales, rhonchi, wheezing, or rubs  Right chest wall prot   HEART: Regular rate and rhythm; No murmurs, rubs, or gallops  ABDOMEN: Soft, Nontender, Nondistended; Bowel sounds present  EXTREMITIES:  2+ Peripheral Pulses, No clubbing, cyanosis, or edema        MEDICATIONS  (STANDING):  artificial  tears Solution 1 Drop(s) Both EYES two times a day  chlorhexidine 2% Cloths 1 Application(s) Topical <User Schedule>  FLUoxetine 80 milliGRAM(s) Oral at bedtime  folic acid 1 milliGRAM(s) Oral daily  hydrocortisone hemorrhoidal Suppository 1 Suppository(s) Rectal at bedtime  HYDROmorphone PCA (1 mG/mL) 30 milliLiter(s) PCA Continuous PCA Continuous  memantine 10 milliGRAM(s) Oral two times a day  methadone    Tablet 15 milliGRAM(s) Oral at bedtime  methadone    Tablet 10 milliGRAM(s) Oral <User Schedule>  multivitamin 1 Tablet(s) Oral daily  naloxegol 25 milliGRAM(s) Oral daily  pantoprazole  Injectable 40 milliGRAM(s) IV Push daily  piperacillin/tazobactam IVPB.. 3.375 Gram(s) IV Intermittent every 8 hours  polyethylene glycol 3350 17 Gram(s) Oral at bedtime  potassium chloride    Tablet ER 40 milliEquivalent(s) Oral once  pregabalin 150 milliGRAM(s) Oral three times a day  sodium chloride 0.9% lock flush 3 milliLiter(s) IV Push every 8 hours    MEDICATIONS  (PRN):  acetaminophen     Tablet .. 650 milliGRAM(s) Oral every 6 hours PRN Temp greater or equal to 38C (100.4F), Mild Pain (1 - 3)  aluminum hydroxide/magnesium hydroxide/simethicone Suspension 30 milliLiter(s) Oral every 4 hours PRN Dyspepsia  glycerin Suppository - Adult 1 Suppository(s) Rectal at bedtime PRN Constipation  LORazepam     Tablet 0.5 milliGRAM(s) Oral two times a day PRN Anxiety  melatonin 3 milliGRAM(s) Oral at bedtime PRN Insomnia  naloxone Injectable 0.1 milliGRAM(s) IV Push every 3 minutes PRN For ANY of the following changes in patient status:  A. RR LESS THAN 10 breaths per minute, B. Oxygen saturation LESS THAN 90%, C. Sedation score of 6  ondansetron Injectable 4 milliGRAM(s) IV Push every 8 hours PRN Nausea and/or Vomiting      Allergies    Perjeta (Other (Severe))  pertuzumab (Other (Severe))    Intolerances          LABS:                          7.6    2.80  )-----------( 249      ( 05 Jan 2024 05:51 )             25.3     01-05    140  |  106  |  12.8  ----------------------------<  79  3.4<L>   |  23.0  |  0.45<L>    Ca    8.4      05 Jan 2024 05:51    TPro  5.1<L>  /  Alb  3.0<L>  /  TBili  0.7  /  DBili  x   /  AST  32<H>  /  ALT  14  /  AlkPhos  157<H>  01-05      Urinalysis Basic - ( 05 Jan 2024 05:51 )    Color: x / Appearance: x / SG: x / pH: x  Gluc: 79 mg/dL / Ketone: x  / Bili: x / Urobili: x   Blood: x / Protein: x / Nitrite: x   Leuk Esterase: x / RBC: x / WBC x   Sq Epi: x / Non Sq Epi: x / Bacteria: x        RADIOLOGY & ADDITIONAL TESTS:

## 2024-01-06 LAB
ALBUMIN SERPL ELPH-MCNC: 3.2 G/DL — LOW (ref 3.3–5.2)
ALBUMIN SERPL ELPH-MCNC: 3.2 G/DL — LOW (ref 3.3–5.2)
ALP SERPL-CCNC: 176 U/L — HIGH (ref 40–120)
ALP SERPL-CCNC: 176 U/L — HIGH (ref 40–120)
ALT FLD-CCNC: 17 U/L — SIGNIFICANT CHANGE UP
ALT FLD-CCNC: 17 U/L — SIGNIFICANT CHANGE UP
ANION GAP SERPL CALC-SCNC: 11 MMOL/L — SIGNIFICANT CHANGE UP (ref 5–17)
ANION GAP SERPL CALC-SCNC: 11 MMOL/L — SIGNIFICANT CHANGE UP (ref 5–17)
AST SERPL-CCNC: 33 U/L — HIGH
AST SERPL-CCNC: 33 U/L — HIGH
BASOPHILS # BLD AUTO: 0 K/UL — SIGNIFICANT CHANGE UP (ref 0–0.2)
BASOPHILS # BLD AUTO: 0 K/UL — SIGNIFICANT CHANGE UP (ref 0–0.2)
BASOPHILS NFR BLD AUTO: 0 % — SIGNIFICANT CHANGE UP (ref 0–2)
BASOPHILS NFR BLD AUTO: 0 % — SIGNIFICANT CHANGE UP (ref 0–2)
BILIRUB SERPL-MCNC: 1.1 MG/DL — SIGNIFICANT CHANGE UP (ref 0.4–2)
BILIRUB SERPL-MCNC: 1.1 MG/DL — SIGNIFICANT CHANGE UP (ref 0.4–2)
BUN SERPL-MCNC: 12.1 MG/DL — SIGNIFICANT CHANGE UP (ref 8–20)
BUN SERPL-MCNC: 12.1 MG/DL — SIGNIFICANT CHANGE UP (ref 8–20)
CALCIUM SERPL-MCNC: 8.7 MG/DL — SIGNIFICANT CHANGE UP (ref 8.4–10.5)
CALCIUM SERPL-MCNC: 8.7 MG/DL — SIGNIFICANT CHANGE UP (ref 8.4–10.5)
CHLORIDE SERPL-SCNC: 106 MMOL/L — SIGNIFICANT CHANGE UP (ref 96–108)
CHLORIDE SERPL-SCNC: 106 MMOL/L — SIGNIFICANT CHANGE UP (ref 96–108)
CO2 SERPL-SCNC: 24 MMOL/L — SIGNIFICANT CHANGE UP (ref 22–29)
CO2 SERPL-SCNC: 24 MMOL/L — SIGNIFICANT CHANGE UP (ref 22–29)
CREAT SERPL-MCNC: 0.52 MG/DL — SIGNIFICANT CHANGE UP (ref 0.5–1.3)
CREAT SERPL-MCNC: 0.52 MG/DL — SIGNIFICANT CHANGE UP (ref 0.5–1.3)
EGFR: 123 ML/MIN/1.73M2 — SIGNIFICANT CHANGE UP
EGFR: 123 ML/MIN/1.73M2 — SIGNIFICANT CHANGE UP
EOSINOPHIL # BLD AUTO: 0.1 K/UL — SIGNIFICANT CHANGE UP (ref 0–0.5)
EOSINOPHIL # BLD AUTO: 0.1 K/UL — SIGNIFICANT CHANGE UP (ref 0–0.5)
EOSINOPHIL NFR BLD AUTO: 3.4 % — SIGNIFICANT CHANGE UP (ref 0–6)
EOSINOPHIL NFR BLD AUTO: 3.4 % — SIGNIFICANT CHANGE UP (ref 0–6)
GLUCOSE SERPL-MCNC: 80 MG/DL — SIGNIFICANT CHANGE UP (ref 70–99)
GLUCOSE SERPL-MCNC: 80 MG/DL — SIGNIFICANT CHANGE UP (ref 70–99)
HCT VFR BLD CALC: 31.9 % — LOW (ref 34.5–45)
HCT VFR BLD CALC: 31.9 % — LOW (ref 34.5–45)
HGB BLD-MCNC: 9.7 G/DL — LOW (ref 11.5–15.5)
HGB BLD-MCNC: 9.7 G/DL — LOW (ref 11.5–15.5)
IMM GRANULOCYTES NFR BLD AUTO: 0.3 % — SIGNIFICANT CHANGE UP (ref 0–0.9)
IMM GRANULOCYTES NFR BLD AUTO: 0.3 % — SIGNIFICANT CHANGE UP (ref 0–0.9)
LYMPHOCYTES # BLD AUTO: 0.97 K/UL — LOW (ref 1–3.3)
LYMPHOCYTES # BLD AUTO: 0.97 K/UL — LOW (ref 1–3.3)
LYMPHOCYTES # BLD AUTO: 32.9 % — SIGNIFICANT CHANGE UP (ref 13–44)
LYMPHOCYTES # BLD AUTO: 32.9 % — SIGNIFICANT CHANGE UP (ref 13–44)
MAGNESIUM SERPL-MCNC: 2 MG/DL — SIGNIFICANT CHANGE UP (ref 1.6–2.6)
MAGNESIUM SERPL-MCNC: 2 MG/DL — SIGNIFICANT CHANGE UP (ref 1.6–2.6)
MCHC RBC-ENTMCNC: 27.6 PG — SIGNIFICANT CHANGE UP (ref 27–34)
MCHC RBC-ENTMCNC: 27.6 PG — SIGNIFICANT CHANGE UP (ref 27–34)
MCHC RBC-ENTMCNC: 30.4 GM/DL — LOW (ref 32–36)
MCHC RBC-ENTMCNC: 30.4 GM/DL — LOW (ref 32–36)
MCV RBC AUTO: 90.9 FL — SIGNIFICANT CHANGE UP (ref 80–100)
MCV RBC AUTO: 90.9 FL — SIGNIFICANT CHANGE UP (ref 80–100)
MONOCYTES # BLD AUTO: 0.38 K/UL — SIGNIFICANT CHANGE UP (ref 0–0.9)
MONOCYTES # BLD AUTO: 0.38 K/UL — SIGNIFICANT CHANGE UP (ref 0–0.9)
MONOCYTES NFR BLD AUTO: 12.9 % — SIGNIFICANT CHANGE UP (ref 2–14)
MONOCYTES NFR BLD AUTO: 12.9 % — SIGNIFICANT CHANGE UP (ref 2–14)
MRSA PCR RESULT.: SIGNIFICANT CHANGE UP
MRSA PCR RESULT.: SIGNIFICANT CHANGE UP
NEUTROPHILS # BLD AUTO: 1.49 K/UL — LOW (ref 1.8–7.4)
NEUTROPHILS # BLD AUTO: 1.49 K/UL — LOW (ref 1.8–7.4)
NEUTROPHILS NFR BLD AUTO: 50.5 % — SIGNIFICANT CHANGE UP (ref 43–77)
NEUTROPHILS NFR BLD AUTO: 50.5 % — SIGNIFICANT CHANGE UP (ref 43–77)
PLATELET # BLD AUTO: 257 K/UL — SIGNIFICANT CHANGE UP (ref 150–400)
PLATELET # BLD AUTO: 257 K/UL — SIGNIFICANT CHANGE UP (ref 150–400)
POTASSIUM SERPL-MCNC: 3.9 MMOL/L — SIGNIFICANT CHANGE UP (ref 3.5–5.3)
POTASSIUM SERPL-MCNC: 3.9 MMOL/L — SIGNIFICANT CHANGE UP (ref 3.5–5.3)
POTASSIUM SERPL-SCNC: 3.9 MMOL/L — SIGNIFICANT CHANGE UP (ref 3.5–5.3)
POTASSIUM SERPL-SCNC: 3.9 MMOL/L — SIGNIFICANT CHANGE UP (ref 3.5–5.3)
PROT SERPL-MCNC: 5.8 G/DL — LOW (ref 6.6–8.7)
PROT SERPL-MCNC: 5.8 G/DL — LOW (ref 6.6–8.7)
RBC # BLD: 3.51 M/UL — LOW (ref 3.8–5.2)
RBC # BLD: 3.51 M/UL — LOW (ref 3.8–5.2)
RBC # FLD: 21.2 % — HIGH (ref 10.3–14.5)
RBC # FLD: 21.2 % — HIGH (ref 10.3–14.5)
S AUREUS DNA NOSE QL NAA+PROBE: SIGNIFICANT CHANGE UP
S AUREUS DNA NOSE QL NAA+PROBE: SIGNIFICANT CHANGE UP
SODIUM SERPL-SCNC: 141 MMOL/L — SIGNIFICANT CHANGE UP (ref 135–145)
SODIUM SERPL-SCNC: 141 MMOL/L — SIGNIFICANT CHANGE UP (ref 135–145)
WBC # BLD: 2.95 K/UL — LOW (ref 3.8–10.5)
WBC # BLD: 2.95 K/UL — LOW (ref 3.8–10.5)
WBC # FLD AUTO: 2.95 K/UL — LOW (ref 3.8–10.5)
WBC # FLD AUTO: 2.95 K/UL — LOW (ref 3.8–10.5)

## 2024-01-06 PROCEDURE — 99233 SBSQ HOSP IP/OBS HIGH 50: CPT

## 2024-01-06 PROCEDURE — 99232 SBSQ HOSP IP/OBS MODERATE 35: CPT

## 2024-01-06 RX ADMIN — PANTOPRAZOLE SODIUM 40 MILLIGRAM(S): 20 TABLET, DELAYED RELEASE ORAL at 05:29

## 2024-01-06 RX ADMIN — HYDROMORPHONE HYDROCHLORIDE 30 MILLILITER(S): 2 INJECTION INTRAMUSCULAR; INTRAVENOUS; SUBCUTANEOUS at 05:38

## 2024-01-06 RX ADMIN — PANTOPRAZOLE SODIUM 40 MILLIGRAM(S): 20 TABLET, DELAYED RELEASE ORAL at 18:07

## 2024-01-06 RX ADMIN — SODIUM CHLORIDE 3 MILLILITER(S): 9 INJECTION INTRAMUSCULAR; INTRAVENOUS; SUBCUTANEOUS at 21:46

## 2024-01-06 RX ADMIN — Medication 150 MILLIGRAM(S): at 14:33

## 2024-01-06 RX ADMIN — SODIUM CHLORIDE 3 MILLILITER(S): 9 INJECTION INTRAMUSCULAR; INTRAVENOUS; SUBCUTANEOUS at 14:42

## 2024-01-06 RX ADMIN — Medication 3 MILLIGRAM(S): at 21:46

## 2024-01-06 RX ADMIN — Medication 80 MILLIGRAM(S): at 21:46

## 2024-01-06 RX ADMIN — PIPERACILLIN AND TAZOBACTAM 25 GRAM(S): 4; .5 INJECTION, POWDER, LYOPHILIZED, FOR SOLUTION INTRAVENOUS at 14:45

## 2024-01-06 RX ADMIN — Medication 1 DROP(S): at 18:11

## 2024-01-06 RX ADMIN — SODIUM CHLORIDE 3 MILLILITER(S): 9 INJECTION INTRAMUSCULAR; INTRAVENOUS; SUBCUTANEOUS at 05:33

## 2024-01-06 RX ADMIN — HYDROMORPHONE HYDROCHLORIDE 30 MILLILITER(S): 2 INJECTION INTRAMUSCULAR; INTRAVENOUS; SUBCUTANEOUS at 19:53

## 2024-01-06 RX ADMIN — PIPERACILLIN AND TAZOBACTAM 25 GRAM(S): 4; .5 INJECTION, POWDER, LYOPHILIZED, FOR SOLUTION INTRAVENOUS at 09:09

## 2024-01-06 RX ADMIN — MEMANTINE HYDROCHLORIDE 10 MILLIGRAM(S): 10 TABLET ORAL at 18:07

## 2024-01-06 RX ADMIN — Medication 150 MILLIGRAM(S): at 21:46

## 2024-01-06 RX ADMIN — Medication 1 MILLIGRAM(S): at 11:38

## 2024-01-06 RX ADMIN — Medication 1 TABLET(S): at 11:38

## 2024-01-06 RX ADMIN — METHADONE HYDROCHLORIDE 20 MILLIGRAM(S): 40 TABLET ORAL at 21:46

## 2024-01-06 RX ADMIN — METHADONE HYDROCHLORIDE 10 MILLIGRAM(S): 40 TABLET ORAL at 14:33

## 2024-01-06 RX ADMIN — PIPERACILLIN AND TAZOBACTAM 25 GRAM(S): 4; .5 INJECTION, POWDER, LYOPHILIZED, FOR SOLUTION INTRAVENOUS at 21:47

## 2024-01-06 RX ADMIN — Medication 0.5 MILLIGRAM(S): at 21:46

## 2024-01-06 RX ADMIN — MEMANTINE HYDROCHLORIDE 10 MILLIGRAM(S): 10 TABLET ORAL at 05:29

## 2024-01-06 RX ADMIN — NALOXEGOL OXALATE 25 MILLIGRAM(S): 12.5 TABLET, FILM COATED ORAL at 11:38

## 2024-01-06 RX ADMIN — Medication 150 MILLIGRAM(S): at 05:30

## 2024-01-06 RX ADMIN — METHADONE HYDROCHLORIDE 10 MILLIGRAM(S): 40 TABLET ORAL at 05:37

## 2024-01-06 NOTE — PROGRESS NOTE ADULT - NS ATTEND AMEND GEN_ALL_CORE FT
I agree with assessment and plan as above. Pt is a 37 yo F with metastatic breast cancer with diffuse osseous mets, prior malignant pericardial effusion, pleural effusion s/p drain, s/p chemo and radiation, now with recurrent symptomatic anemia requiring transfusion. Patient reports multiple dark stools over the past two days associated with intermittent cramping abdominal pain. Labs significant for downtrending Hgb (responded to transfusion yesterday). Recent GI work up as outlined above, last EGD was in 10/2023 at [place] with antral erythema. Recent VCE showed severe enteritis, CTE showed ground glass opacities, trace L pleural effusion, mild antral thickening and mild inflammation in R colon. Found to be RSV positive, satting well on RA, no cough, mild LOUIE. Continue PPI and supportive care. Diet as tolerated and keep NPO at midnight on Sunday for EGD on Monday. Trend Hgb daily, transfuse as needed. I agree with assessment and plan as above. Pt is a 37 yo F with metastatic breast cancer with diffuse osseous mets, prior malignant pericardial effusion, pleural effusion s/p drain, s/p chemo and radiation, now with recurrent symptomatic anemia requiring transfusion. Patient reports multiple dark stools over the past two days associated with intermittent cramping abdominal pain. Labs significant for downtrending Hgb (responded to transfusion yesterday). Recent GI work up as outlined above, last EGD was in 10/2023 at [place] with antral erythema. Recent VCE showed severe enteritis, CTE showed ground glass opacities, trace L pleural effusion, mild antral thickening and mild inflammation in R colon. Found to be RSV positive, satting well on RA, no cough, mild LOUIE. Continue PPI and supportive care. Diet as tolerated and keep NPO at midnight on Sunday for EGD on Monday. Trend Hgb daily, transfuse as needed.

## 2024-01-06 NOTE — PROGRESS NOTE ADULT - ASSESSMENT
39 yo F known patient of Dr Chapa at [facility], metastatic breast ca on Enhertu, GIB who was admitted to the hospital due to severe anemia and GIB.     1) Metastatic Breast ca  as above  on Enhertu. Last dose on 12/20/2023.  PET CT showed stable disease.  f/u with Dr Chapa upon DC    2) Anemia   s/p prbc transfusion.  GI on board  IV abx  cultures negative  s/p CTE showing gastritis  GI recs regarding need for colonoscopy/capsule study  S/P 1U prbc 1/5  hGB 9.6 TODAY    3) Leukopenia  recent chemo  keep ANC > 1000  wbc 2.95k  No fever.    4) DVT ppx      will follow 37 yo F known patient of Dr Chapa at [facility], metastatic breast ca on Enhertu, GIB who was admitted to the hospital due to severe anemia and GIB.     1) Metastatic Breast ca  as above  on Enhertu. Last dose on 12/20/2023.  PET CT showed stable disease.  f/u with Dr Chapa upon DC    2) Anemia   s/p prbc transfusion.  GI on board  IV abx  cultures negative  s/p CTE showing gastritis  GI recs regarding need for colonoscopy/capsule study  S/P 1U prbc 1/5  hGB 9.6 TODAY    3) Leukopenia  recent chemo  keep ANC > 1000  wbc 2.95k  No fever.    4) DVT ppx      will follow

## 2024-01-06 NOTE — PROGRESS NOTE ADULT - SUBJECTIVE AND OBJECTIVE BOX
CHIEF COMPLAINT/INTERVAL HISTORY:    Patient is a 38y old  Female who presents with a chief complaint of Symptomatic anemia (06 Jan 2024 12:23)    SUBJECTIVE & OBJECTIVE: Pt seen and examined at bedside. No overnight events. Reports pain is controlled. No acute complaints.    ROS: No chest pain, palpitations, SOB, light headedness, dizziness, headache, nausea/vomiting, fevers/chills, abdominal pain, dysuria.  ICU Vital Signs Last 24 Hrs  T(C): 36.9 (06 Jan 2024 18:23), Max: 36.9 (06 Jan 2024 18:23)  T(F): 98.4 (06 Jan 2024 18:23), Max: 98.4 (06 Jan 2024 18:23)  HR: 82 (06 Jan 2024 18:23) (80 - 82)  BP: 100/64 (06 Jan 2024 18:23) (98/62 - 106/64)  RR: 18 (06 Jan 2024 18:23) (18 - 18)  SpO2: 98% (06 Jan 2024 18:23) (96% - 98%)      MEDICATIONS  (STANDING):  artificial  tears Solution 1 Drop(s) Both EYES two times a day  chlorhexidine 2% Cloths 1 Application(s) Topical <User Schedule>  FLUoxetine 80 milliGRAM(s) Oral at bedtime  folic acid 1 milliGRAM(s) Oral daily  hydrocortisone hemorrhoidal Suppository 1 Suppository(s) Rectal at bedtime  HYDROmorphone PCA (1 mG/mL) 30 milliLiter(s) PCA Continuous PCA Continuous  memantine 10 milliGRAM(s) Oral two times a day  methadone    Tablet 10 milliGRAM(s) Oral <User Schedule>  methadone    Tablet 20 milliGRAM(s) Oral at bedtime  multivitamin 1 Tablet(s) Oral daily  naloxegol 25 milliGRAM(s) Oral daily  pantoprazole  Injectable 40 milliGRAM(s) IV Push every 12 hours  piperacillin/tazobactam IVPB.. 3.375 Gram(s) IV Intermittent every 8 hours  polyethylene glycol 3350 17 Gram(s) Oral at bedtime  pregabalin 150 milliGRAM(s) Oral three times a day  sodium chloride 0.9% lock flush 3 milliLiter(s) IV Push every 8 hours    MEDICATIONS  (PRN):  acetaminophen     Tablet .. 650 milliGRAM(s) Oral every 6 hours PRN Temp greater or equal to 38C (100.4F), Mild Pain (1 - 3)  aluminum hydroxide/magnesium hydroxide/simethicone Suspension 30 milliLiter(s) Oral every 4 hours PRN Dyspepsia  glycerin Suppository - Adult 1 Suppository(s) Rectal at bedtime PRN Constipation  LORazepam     Tablet 0.5 milliGRAM(s) Oral two times a day PRN Anxiety  melatonin 3 milliGRAM(s) Oral at bedtime PRN Insomnia  naloxone Injectable 0.1 milliGRAM(s) IV Push every 3 minutes PRN For ANY of the following changes in patient status:  A. RR LESS THAN 10 breaths per minute, B. Oxygen saturation LESS THAN 90%, C. Sedation score of 6  ondansetron Injectable 4 milliGRAM(s) IV Push every 8 hours PRN Nausea and/or Vomiting      LABS:                        9.7    2.95  )-----------( 257      ( 06 Jan 2024 05:15 )             31.9     01-06    141  |  106  |  12.1  ----------------------------<  80  3.9   |  24.0  |  0.52    Ca    8.7      06 Jan 2024 05:15  Mg     2.0     01-06    TPro  5.8<L>  /  Alb  3.2<L>  /  TBili  1.1  /  DBili  x   /  AST  33<H>  /  ALT  17  /  AlkPhos  176<H>  01-06      Urinalysis Basic - ( 06 Jan 2024 05:15 )    Color: x / Appearance: x / SG: x / pH: x  Gluc: 80 mg/dL / Ketone: x  / Bili: x / Urobili: x   Blood: x / Protein: x / Nitrite: x   Leuk Esterase: x / RBC: x / WBC x   Sq Epi: x / Non Sq Epi: x / Bacteria: x      PHYSICAL EXAM:    GENERAL: middle aged female, sitting in bed, NAD  HEAD:  Atraumatic, Normocephalic  EYES: EOMI, PERRLA, conjunctiva and sclera clear  ENMT: Moist mucous membranes  NECK: Supple  NERVOUS SYSTEM:  Alert & Oriented X3, Motor Strength 5/5 B/L upper and lower extremities   CHEST/LUNG: Clear to auscultation bilaterally   HEART: Regular rate and rhythm; _ S1/S2  ABDOMEN: Soft, Nontender, Nondistended; Bowel sounds present  EXTREMITIES:  no pedal edema

## 2024-01-06 NOTE — PROGRESS NOTE ADULT - ASSESSMENT
39 y/o female with hx of Stage lV Breast cancer with diffuse osseous mets, prior malignant pericardial effusion, pleural effusion s/p drain, s/p XRT/Chemo last chemo with Enhertu with plan to switch to Keytruda on 11/29 but has been on hold due to recurrent symptomatic anemia requiring multiple transfusions. Recently tested positive for RSV, no cough.     GI consulted for symptomatic anemia/dark stool    -Prior endoscopic evaluation:   VCE on 12/11/23 revealed enteritis  Colonoscopy on 11/20 showed internal hemorrhoids  EGD/ colonoscopy (10/2023) at [place] showed antral erythema, colonoscopy poor prep     #symptomatic anemia  - 1/4/2024 CT A/P (CT Enterography): mild concentric wall thickening involving gastric antrum/pylorus, mild mucosal enhancement cecum and ascending colon with minimal pericolic stranding extending along the right paracolic gutter and posterior plank, generalized colonic fecal retention with mild air distended rectum, no bowel obstruction  - Hgb stable today, 9.7g  - Trend and transfuse as needed  - Avoid NSAIDs  - Continue Pantoprazole  - EGD tentatively planned for Monday 1/8/2023  - NPO midnight on Sunday    #chronic constipation  - Serial abdominal exams  - Continue bowel regimen while on opioids  - Encourage ambulation as tolerated  - Anusol suppository for internal hemorrhoids   37 y/o female with hx of Stage lV Breast cancer with diffuse osseous mets, prior malignant pericardial effusion, pleural effusion s/p drain, s/p XRT/Chemo last chemo with Enhertu with plan to switch to Keytruda on 11/29 but has been on hold due to recurrent symptomatic anemia requiring multiple transfusions. Recently tested positive for RSV, no cough.     GI consulted for symptomatic anemia/dark stool    -Prior endoscopic evaluation:   VCE on 12/11/23 revealed enteritis  Colonoscopy on 11/20 showed internal hemorrhoids  EGD/ colonoscopy (10/2023) at [place] showed antral erythema, colonoscopy poor prep     #symptomatic anemia  - 1/4/2024 CT A/P (CT Enterography): mild concentric wall thickening involving gastric antrum/pylorus, mild mucosal enhancement cecum and ascending colon with minimal pericolic stranding extending along the right paracolic gutter and posterior plank, generalized colonic fecal retention with mild air distended rectum, no bowel obstruction  - Hgb stable today, 9.7g  - Trend and transfuse as needed  - Avoid NSAIDs  - Continue Pantoprazole  - EGD tentatively planned for Monday 1/8/2023  - NPO midnight on Sunday    #chronic constipation  - Serial abdominal exams  - Continue bowel regimen while on opioids  - Encourage ambulation as tolerated  - Anusol suppository for internal hemorrhoids   39 y/o female with hx of Stage lV Breast cancer with diffuse osseous mets, prior malignant pericardial effusion, pleural effusion s/p drain, s/p XRT/Chemo last chemo with Enhertu with plan to switch to Keytruda on 11/29 but has been on hold due to recurrent symptomatic anemia requiring multiple transfusions. Recently tested positive for RSV, no cough.     GI consulted for symptomatic anemia/dark stool    -Prior endoscopic evaluation:   VCE on 12/11/23 revealed enteritis  Colonoscopy on 11/20 showed internal hemorrhoids  EGD/ colonoscopy (10/2023) at [place] showed antral erythema, colonoscopy poor prep     #Symptomatic anemia  #Dark stools  - 1/4/2024 CT A/P (CT Enterography): mild concentric wall thickening involving gastric antrum/pylorus, mild mucosal enhancement cecum and ascending colon with minimal pericolic stranding extending along the right paracolic gutter and posterior plank, generalized colonic fecal retention with mild air distended rectum, no bowel obstruction  - Hgb stable today, 9.7g  - Trend and transfuse as needed  - Avoid NSAIDs  - Continue Pantoprazole  - EGD tentatively planned for Monday 1/8/2023  - NPO midnight on Sunday    #chronic constipation  - Serial abdominal exams  - Continue bowel regimen while on opioids  - Encourage ambulation as tolerated  - Anusol suppository for internal hemorrhoids   37 y/o female with hx of Stage lV Breast cancer with diffuse osseous mets, prior malignant pericardial effusion, pleural effusion s/p drain, s/p XRT/Chemo last chemo with Enhertu with plan to switch to Keytruda on 11/29 but has been on hold due to recurrent symptomatic anemia requiring multiple transfusions. Recently tested positive for RSV, no cough.     GI consulted for symptomatic anemia/dark stool    -Prior endoscopic evaluation:   VCE on 12/11/23 revealed enteritis  Colonoscopy on 11/20 showed internal hemorrhoids  EGD/ colonoscopy (10/2023) at [place] showed antral erythema, colonoscopy poor prep     #Symptomatic anemia  #Dark stools  - 1/4/2024 CT A/P (CT Enterography): mild concentric wall thickening involving gastric antrum/pylorus, mild mucosal enhancement cecum and ascending colon with minimal pericolic stranding extending along the right paracolic gutter and posterior plank, generalized colonic fecal retention with mild air distended rectum, no bowel obstruction  - Hgb stable today, 9.7g  - Trend and transfuse as needed  - Avoid NSAIDs  - Continue Pantoprazole  - EGD tentatively planned for Monday 1/8/2023  - NPO midnight on Sunday    #chronic constipation  - Serial abdominal exams  - Continue bowel regimen while on opioids  - Encourage ambulation as tolerated  - Anusol suppository for internal hemorrhoids

## 2024-01-06 NOTE — PROGRESS NOTE ADULT - ASSESSMENT
Patient is a 37 y/o female with history of Stage-4 breast cancer admitted with recurrent symptomatic anemia.    1. Recurrent symptomatic anemia  -Multi-factorial from likely gastritis/colitis, prior chemo, active malignancy; rule out GIB  -Hb stable s/p 2 units of PRBC  -Iron studies reviewed  -Prior colonoscopy reviewed in 11/23  -Continue Anusol suppositories for hemorrhoids daily  -Reports enteritis on Capsular endoscopy   -CTE with gastritis and colitis  -Continue PPI  -GI PCR pending  -Continue empiric zosyn  -EGD on Monday  -GI recs appreciated    2. Leukopenia  -ANC > 1000  -Likely related to malignancy/prior chemo  -Monitor CBC  -hem/onc recs appreciated    3. Stage-4 Breast cancer  - Continue Dilaudid PCA   - Continue Methadone, Lyrica  - pain management on board  - hem/onc recs appreciated    4. Anxiety   - stable today  - continue Prozac    5. Constipation  - continue Movantik and miralax    6. Forgetfulness  -AAO x 3 but slow to respond  -continue namenda    DVT ppx - SCDs    Dispo- Remains acute; NPO after midnight on Sunday for EGD on 1/8.    Plan discussed with patient, RN   Patient is a 39 y/o female with history of Stage-4 breast cancer admitted with recurrent symptomatic anemia.    1. Recurrent symptomatic anemia  -Multi-factorial from likely gastritis/colitis, prior chemo, active malignancy; rule out GIB  -Hb stable s/p 2 units of PRBC  -Iron studies reviewed  -Prior colonoscopy reviewed in 11/23  -Continue Anusol suppositories for hemorrhoids daily  -Reports enteritis on Capsular endoscopy   -CTE with gastritis and colitis  -Continue PPI  -GI PCR pending  -Continue empiric zosyn  -EGD on Monday  -GI recs appreciated    2. Leukopenia  -ANC > 1000  -Likely related to malignancy/prior chemo  -Monitor CBC  -hem/onc recs appreciated    3. Stage-4 Breast cancer  - Continue Dilaudid PCA   - Continue Methadone, Lyrica  - pain management on board  - hem/onc recs appreciated    4. Anxiety   - stable today  - continue Prozac    5. Constipation  - continue Movantik and miralax    6. Forgetfulness  -AAO x 3 but slow to respond  -continue namenda    DVT ppx - SCDs    Dispo- Remains acute; NPO after midnight on Sunday for EGD on 1/8.    Plan discussed with patient, RN

## 2024-01-06 NOTE — PROGRESS NOTE ADULT - SUBJECTIVE AND OBJECTIVE BOX
Patient is a 38y old  Female who presents with a chief complaint of Symptomatic anemia (06 Jan 2024 10:45)      HPI:  39 y/o female with hx of Stage-4 Breast cancer with diffuse osseous mets, prior malignant pericardial effusion, pleural effusion s/p drain, s/p XRT/Chemo with recurrent symptomatic anemia requiring multiple transfusions. Pt was seen and evaluated at bedside. She recently tested positive for RSV, was having some SOB with walking but no cough. No overnight event,she is c/o mild epigastric pain. Hgb remains stable at 9.7, was 7.6 yesterday.    REVIEW OF SYSTEMS:  Constitutional: +fatigue, No fever, weight loss  ENMT:  No difficulty hearing, tinnitus, vertigo; No sinus or throat pain  Respiratory: No cough, wheezing, chills or hemoptysis  Cardiovascular: No chest pain, palpitations, dizziness or leg swelling  Gastrointestinal: +epigastric pain, No nausea, vomiting or hematemesis; No diarrhea or constipation. No melena or hematochezia.  Skin: No itching, burning, rashes or lesions   Musculoskeletal: No joint pain or swelling; No muscle, back or extremity pain    PAST MEDICAL & SURGICAL HISTORY:  H/O compression fracture of spine      Anxiety      Metastatic breast cancer      H/O pleural effusion      Pericardial effusion      S/P tonsillectomy      H/O chest tube placement  12/23/21      S/P pericardiocentesis  12/28/21          FAMILY HISTORY:  FH: CVA (cerebrovascular accident)        SOCIAL HISTORY:  Smoking Status: [ ] Current, [ ] Former, [ ] Never  Pack Years:  [  ] EtOH  [  ] IVDA    MEDICATIONS:  MEDICATIONS  (STANDING):  artificial  tears Solution 1 Drop(s) Both EYES two times a day  chlorhexidine 2% Cloths 1 Application(s) Topical <User Schedule>  FLUoxetine 80 milliGRAM(s) Oral at bedtime  folic acid 1 milliGRAM(s) Oral daily  hydrocortisone hemorrhoidal Suppository 1 Suppository(s) Rectal at bedtime  HYDROmorphone PCA (1 mG/mL) 30 milliLiter(s) PCA Continuous PCA Continuous  memantine 10 milliGRAM(s) Oral two times a day  methadone    Tablet 20 milliGRAM(s) Oral at bedtime  methadone    Tablet 10 milliGRAM(s) Oral <User Schedule>  multivitamin 1 Tablet(s) Oral daily  naloxegol 25 milliGRAM(s) Oral daily  pantoprazole  Injectable 40 milliGRAM(s) IV Push every 12 hours  piperacillin/tazobactam IVPB.. 3.375 Gram(s) IV Intermittent every 8 hours  polyethylene glycol 3350 17 Gram(s) Oral at bedtime  pregabalin 150 milliGRAM(s) Oral three times a day  sodium chloride 0.9% lock flush 3 milliLiter(s) IV Push every 8 hours    MEDICATIONS  (PRN):  acetaminophen     Tablet .. 650 milliGRAM(s) Oral every 6 hours PRN Temp greater or equal to 38C (100.4F), Mild Pain (1 - 3)  aluminum hydroxide/magnesium hydroxide/simethicone Suspension 30 milliLiter(s) Oral every 4 hours PRN Dyspepsia  glycerin Suppository - Adult 1 Suppository(s) Rectal at bedtime PRN Constipation  LORazepam     Tablet 0.5 milliGRAM(s) Oral two times a day PRN Anxiety  melatonin 3 milliGRAM(s) Oral at bedtime PRN Insomnia  naloxone Injectable 0.1 milliGRAM(s) IV Push every 3 minutes PRN For ANY of the following changes in patient status:  A. RR LESS THAN 10 breaths per minute, B. Oxygen saturation LESS THAN 90%, C. Sedation score of 6  ondansetron Injectable 4 milliGRAM(s) IV Push every 8 hours PRN Nausea and/or Vomiting      Allergies    Perjeta (Other (Severe))  pertuzumab (Other (Severe))    Intolerances        Vital Signs Last 24 Hrs  T(C): 36.7 (06 Jan 2024 11:49), Max: 36.7 (05 Jan 2024 16:17)  T(F): 98.1 (06 Jan 2024 11:49), Max: 98.1 (05 Jan 2024 16:17)  HR: 80 (06 Jan 2024 11:49) (80 - 90)  BP: 98/62 (06 Jan 2024 11:49) (98/62 - 109/73)  BP(mean): --  RR: 18 (06 Jan 2024 11:49) (18 - 18)  SpO2: 98% (06 Jan 2024 11:49) (94% - 98%)    Parameters below as of 05 Jan 2024 18:15  Patient On (Oxygen Delivery Method): room air            PHYSICAL EXAM:    General:  in no acute distress  HEENT: MMM, conjunctiva and sclera clear  Gastrointestinal: Soft, non-tender non-distended; Normal bowel sounds; No rebound or guarding  Extremities: Normal range of motion, No clubbing, cyanosis or edema  Neurological: Alert and oriented x3  Skin: Warm and dry. No obvious rash      LABS:                        9.7    2.95  )-----------( 257      ( 06 Jan 2024 05:15 )             31.9     06 Jan 2024 05:15    141    |  106    |  12.1   ----------------------------<  80     3.9     |  24.0   |  0.52     Ca    8.7        06 Jan 2024 05:15  Mg     2.0       06 Jan 2024 05:15    TPro  5.8    /  Alb  3.2    /  TBili  1.1    /  DBili  x      /  AST  33     /  ALT  17     /  AlkPhos  176    / Amylase x      /Lipase x      06 Jan 2024 05:15              RADIOLOGY & ADDITIONAL STUDIES:       < from: CT Enterography w/ Oral Cont and w/ IV Cont (01.04.24 @ 12:29) >    ACC: [id number] EXAM:  CT ENTEROGRAPHY OC IC   ORDERED BY: SAHARA QUAN     PROCEDURE DATE:  01/04/2024          INTERPRETATION:  CLINICAL INFORMATION: Anemia. Metastatic breast cancer.    COMPARISON: CT scan abdomen pelvis 1/3/2024.    CONTRAST/COMPLICATIONS:  IV Contrast: Omnipaque 350  88 cc administered   12 cc discarded  Oral Contrast: VoLumen  Complications: None reported at time of study completion    PROCEDURE:  CT of the Abdomen and Pelvis (CT Enterography) was performed with   intravenous contrast in the late arterial phase.  Sagittal and coronal reformats were performed.    FINDINGS:    LOWER CHEST:  Small, poorly defined groundglass opacities visualized bilateral lung   bases.  Possible trace loculated left pleural fluid.    LIVER: Hepatic steatosis.  BILE DUCTS: Normal caliber.  GALLBLADDER: Layering sludge with probable noncalcified dependent   gallstone.  SPLEEN: Mild splenomegaly.  PANCREAS: Within normal limits.  ADRENALS: Within normal limits.  KIDNEYS/URETERS: Withinnormal limits.    BLADDER: Within normal limits.  REPRODUCTIVE ORGANS:  The uterus and adnexa appear within normal limits.    BOWEL:  There is mild concentric wall thickening involving the gastric   antrum/pylorus.  No discrete ulcer is noted.  Thereis mild mucosal hyperenhancement cecum and ascending colon with   minimal pericolic stranding extending along the right paracolic gutter   and posterior flank.  There is generalized colonic fecal retention with mild air distended   rectum.  No bowel obstruction.  PERITONEUM: Trace free fluid pelvis.    VESSELS: Within normal limits.  RETROPERITONEUM/LYMPH NODES: No lymphadenopathy.    ABDOMINAL WALL: Within normal limits.    BONES:  Osteoblastic and osteolytic metastatic disease, stable.  T12 andL1 vertebroplasty.  Spondylolysis L5.    IMPRESSION:    Concentric gastric antrum/pylorus thickening is suggestive of gastritis.    Mucosal hyperenhancement with mild pericolic stranding right colon may   reflect nonspecific colitis.    Other findings as discussed above.    --- End of Report ---            YVONNE HUITRON MD; Attending Radiologist  This document has been electronically signed. Jan 4 2024  3:37PM    < end of copied text >   Patient is a 38y old  Female who presents with a chief complaint of Symptomatic anemia (06 Jan 2024 10:45)      HPI:  39 y/o female with hx of Stage-4 Breast cancer with diffuse osseous mets, prior malignant pericardial effusion, pleural effusion s/p drain, s/p XRT/Chemo with recurrent symptomatic anemia requiring multiple transfusions. Pt was seen and evaluated at bedside. She recently tested positive for RSV, was having some SOB with walking but no cough. No overnight event,she is c/o mild epigastric pain. Hgb remains stable at 9.7, was 7.6 yesterday.    REVIEW OF SYSTEMS:  Constitutional: +fatigue, No fever, weight loss  ENMT:  No difficulty hearing, tinnitus, vertigo; No sinus or throat pain  Respiratory: No cough, wheezing, chills or hemoptysis  Cardiovascular: No chest pain, palpitations, dizziness or leg swelling  Gastrointestinal: +epigastric pain, No nausea, vomiting or hematemesis; No diarrhea or constipation. No melena or hematochezia.  Skin: No itching, burning, rashes or lesions   Musculoskeletal: No joint pain or swelling; No muscle, back or extremity pain    PAST MEDICAL & SURGICAL HISTORY:  H/O compression fracture of spine      Anxiety      Metastatic breast cancer      H/O pleural effusion      Pericardial effusion      S/P tonsillectomy      H/O chest tube placement  12/23/21      S/P pericardiocentesis  12/28/21          FAMILY HISTORY:  FH: CVA (cerebrovascular accident)        SOCIAL HISTORY:  Smoking Status: [ ] Current, [ ] Former, [ ] Never  Pack Years:  [  ] EtOH  [  ] IVDA    MEDICATIONS:  MEDICATIONS  (STANDING):  artificial  tears Solution 1 Drop(s) Both EYES two times a day  chlorhexidine 2% Cloths 1 Application(s) Topical <User Schedule>  FLUoxetine 80 milliGRAM(s) Oral at bedtime  folic acid 1 milliGRAM(s) Oral daily  hydrocortisone hemorrhoidal Suppository 1 Suppository(s) Rectal at bedtime  HYDROmorphone PCA (1 mG/mL) 30 milliLiter(s) PCA Continuous PCA Continuous  memantine 10 milliGRAM(s) Oral two times a day  methadone    Tablet 20 milliGRAM(s) Oral at bedtime  methadone    Tablet 10 milliGRAM(s) Oral <User Schedule>  multivitamin 1 Tablet(s) Oral daily  naloxegol 25 milliGRAM(s) Oral daily  pantoprazole  Injectable 40 milliGRAM(s) IV Push every 12 hours  piperacillin/tazobactam IVPB.. 3.375 Gram(s) IV Intermittent every 8 hours  polyethylene glycol 3350 17 Gram(s) Oral at bedtime  pregabalin 150 milliGRAM(s) Oral three times a day  sodium chloride 0.9% lock flush 3 milliLiter(s) IV Push every 8 hours    MEDICATIONS  (PRN):  acetaminophen     Tablet .. 650 milliGRAM(s) Oral every 6 hours PRN Temp greater or equal to 38C (100.4F), Mild Pain (1 - 3)  aluminum hydroxide/magnesium hydroxide/simethicone Suspension 30 milliLiter(s) Oral every 4 hours PRN Dyspepsia  glycerin Suppository - Adult 1 Suppository(s) Rectal at bedtime PRN Constipation  LORazepam     Tablet 0.5 milliGRAM(s) Oral two times a day PRN Anxiety  melatonin 3 milliGRAM(s) Oral at bedtime PRN Insomnia  naloxone Injectable 0.1 milliGRAM(s) IV Push every 3 minutes PRN For ANY of the following changes in patient status:  A. RR LESS THAN 10 breaths per minute, B. Oxygen saturation LESS THAN 90%, C. Sedation score of 6  ondansetron Injectable 4 milliGRAM(s) IV Push every 8 hours PRN Nausea and/or Vomiting      Allergies    Perjeta (Other (Severe))  pertuzumab (Other (Severe))    Intolerances        Vital Signs Last 24 Hrs  T(C): 36.7 (06 Jan 2024 11:49), Max: 36.7 (05 Jan 2024 16:17)  T(F): 98.1 (06 Jan 2024 11:49), Max: 98.1 (05 Jan 2024 16:17)  HR: 80 (06 Jan 2024 11:49) (80 - 90)  BP: 98/62 (06 Jan 2024 11:49) (98/62 - 109/73)  BP(mean): --  RR: 18 (06 Jan 2024 11:49) (18 - 18)  SpO2: 98% (06 Jan 2024 11:49) (94% - 98%)    Parameters below as of 05 Jan 2024 18:15  Patient On (Oxygen Delivery Method): room air            PHYSICAL EXAM:    General:  in no acute distress  HEENT: MMM, conjunctiva and sclera clear  Gastrointestinal: Soft, non-tender non-distended; Normal bowel sounds; No rebound or guarding  Extremities: Normal range of motion, No clubbing, cyanosis or edema  Neurological: Alert and oriented x3  Skin: Warm and dry. No obvious rash      LABS:                        9.7    2.95  )-----------( 257      ( 06 Jan 2024 05:15 )             31.9     06 Jan 2024 05:15    141    |  106    |  12.1   ----------------------------<  80     3.9     |  24.0   |  0.52     Ca    8.7        06 Jan 2024 05:15  Mg     2.0       06 Jan 2024 05:15    TPro  5.8    /  Alb  3.2    /  TBili  1.1    /  DBili  x      /  AST  33     /  ALT  17     /  AlkPhos  176    / Amylase x      /Lipase x      06 Jan 2024 05:15              RADIOLOGY & ADDITIONAL STUDIES:       < from: CT Enterography w/ Oral Cont and w/ IV Cont (01.04.24 @ 12:29) >    ACC: [id number] EXAM:  CT ENTEROGRAPHY OC IC   ORDERED BY: SAHARA QUAN     PROCEDURE DATE:  01/04/2024          INTERPRETATION:  CLINICAL INFORMATION: Anemia. Metastatic breast cancer.    COMPARISON: CT scan abdomen pelvis 1/3/2024.    CONTRAST/COMPLICATIONS:  IV Contrast: Omnipaque 350  88 cc administered   12 cc discarded  Oral Contrast: VoLumen  Complications: None reported at time of study completion    PROCEDURE:  CT of the Abdomen and Pelvis (CT Enterography) was performed with   intravenous contrast in the late arterial phase.  Sagittal and coronal reformats were performed.    FINDINGS:    LOWER CHEST:  Small, poorly defined groundglass opacities visualized bilateral lung   bases.  Possible trace loculated left pleural fluid.    LIVER: Hepatic steatosis.  BILE DUCTS: Normal caliber.  GALLBLADDER: Layering sludge with probable noncalcified dependent   gallstone.  SPLEEN: Mild splenomegaly.  PANCREAS: Within normal limits.  ADRENALS: Within normal limits.  KIDNEYS/URETERS: Withinnormal limits.    BLADDER: Within normal limits.  REPRODUCTIVE ORGANS:  The uterus and adnexa appear within normal limits.    BOWEL:  There is mild concentric wall thickening involving the gastric   antrum/pylorus.  No discrete ulcer is noted.  Thereis mild mucosal hyperenhancement cecum and ascending colon with   minimal pericolic stranding extending along the right paracolic gutter   and posterior flank.  There is generalized colonic fecal retention with mild air distended   rectum.  No bowel obstruction.  PERITONEUM: Trace free fluid pelvis.    VESSELS: Within normal limits.  RETROPERITONEUM/LYMPH NODES: No lymphadenopathy.    ABDOMINAL WALL: Within normal limits.    BONES:  Osteoblastic and osteolytic metastatic disease, stable.  T12 andL1 vertebroplasty.  Spondylolysis L5.    IMPRESSION:    Concentric gastric antrum/pylorus thickening is suggestive of gastritis.    Mucosal hyperenhancement with mild pericolic stranding right colon may   reflect nonspecific colitis.    Other findings as discussed above.    --- End of Report ---            YVONNE HUITRON MD; Attending Radiologist  This document has been electronically signed. Jan 4 2024  3:37PM    < end of copied text >   Patient is a 38y old  Female who presents with a chief complaint of Symptomatic anemia (06 Jan 2024 10:45)      HPI:  37 y/o female with hx of Stage-4 Breast cancer with diffuse osseous mets, prior malignant pericardial effusion, pleural effusion s/p drain, s/p XRT/Chemo with recurrent symptomatic anemia requiring multiple transfusions. Pt was seen and evaluated at bedside. She recently tested positive for RSV, was having some SOB with walking but no cough. No overnight events, She is c/o mild epigastric pain. Hgb remains stable at 9.7, was 7.6 yesterday.    REVIEW OF SYSTEMS:  Constitutional: +fatigue, No fever, weight loss  ENMT:  No difficulty hearing, tinnitus, vertigo; No sinus or throat pain  Respiratory: No cough, wheezing, chills or hemoptysis  Cardiovascular: No chest pain, palpitations, dizziness or leg swelling  Gastrointestinal: +epigastric pain, No nausea, vomiting or hematemesis; No diarrhea or constipation. No melena or hematochezia.  Skin: No itching, burning, rashes or lesions   Musculoskeletal: No joint pain or swelling; No muscle, back or extremity pain    PAST MEDICAL & SURGICAL HISTORY:  H/O compression fracture of spine      Anxiety      Metastatic breast cancer      H/O pleural effusion      Pericardial effusion      S/P tonsillectomy      H/O chest tube placement  12/23/21      S/P pericardiocentesis  12/28/21          FAMILY HISTORY:  FH: CVA (cerebrovascular accident)        SOCIAL HISTORY:  Smoking Status: [ ] Current, [ ] Former, [ ] Never  Pack Years:  [  ] EtOH  [  ] IVDA    MEDICATIONS:  MEDICATIONS  (STANDING):  artificial  tears Solution 1 Drop(s) Both EYES two times a day  chlorhexidine 2% Cloths 1 Application(s) Topical <User Schedule>  FLUoxetine 80 milliGRAM(s) Oral at bedtime  folic acid 1 milliGRAM(s) Oral daily  hydrocortisone hemorrhoidal Suppository 1 Suppository(s) Rectal at bedtime  HYDROmorphone PCA (1 mG/mL) 30 milliLiter(s) PCA Continuous PCA Continuous  memantine 10 milliGRAM(s) Oral two times a day  methadone    Tablet 20 milliGRAM(s) Oral at bedtime  methadone    Tablet 10 milliGRAM(s) Oral <User Schedule>  multivitamin 1 Tablet(s) Oral daily  naloxegol 25 milliGRAM(s) Oral daily  pantoprazole  Injectable 40 milliGRAM(s) IV Push every 12 hours  piperacillin/tazobactam IVPB.. 3.375 Gram(s) IV Intermittent every 8 hours  polyethylene glycol 3350 17 Gram(s) Oral at bedtime  pregabalin 150 milliGRAM(s) Oral three times a day  sodium chloride 0.9% lock flush 3 milliLiter(s) IV Push every 8 hours    MEDICATIONS  (PRN):  acetaminophen     Tablet .. 650 milliGRAM(s) Oral every 6 hours PRN Temp greater or equal to 38C (100.4F), Mild Pain (1 - 3)  aluminum hydroxide/magnesium hydroxide/simethicone Suspension 30 milliLiter(s) Oral every 4 hours PRN Dyspepsia  glycerin Suppository - Adult 1 Suppository(s) Rectal at bedtime PRN Constipation  LORazepam     Tablet 0.5 milliGRAM(s) Oral two times a day PRN Anxiety  melatonin 3 milliGRAM(s) Oral at bedtime PRN Insomnia  naloxone Injectable 0.1 milliGRAM(s) IV Push every 3 minutes PRN For ANY of the following changes in patient status:  A. RR LESS THAN 10 breaths per minute, B. Oxygen saturation LESS THAN 90%, C. Sedation score of 6  ondansetron Injectable 4 milliGRAM(s) IV Push every 8 hours PRN Nausea and/or Vomiting      Allergies    Perjeta (Other (Severe))  pertuzumab (Other (Severe))    Intolerances        Vital Signs Last 24 Hrs  T(C): 36.7 (06 Jan 2024 11:49), Max: 36.7 (05 Jan 2024 16:17)  T(F): 98.1 (06 Jan 2024 11:49), Max: 98.1 (05 Jan 2024 16:17)  HR: 80 (06 Jan 2024 11:49) (80 - 90)  BP: 98/62 (06 Jan 2024 11:49) (98/62 - 109/73)  BP(mean): --  RR: 18 (06 Jan 2024 11:49) (18 - 18)  SpO2: 98% (06 Jan 2024 11:49) (94% - 98%)    Parameters below as of 05 Jan 2024 18:15  Patient On (Oxygen Delivery Method): room air            PHYSICAL EXAM:    General:  in no acute distress  HEENT: MMM, conjunctiva and sclera clear  Gastrointestinal: Soft, non-tender non-distended; Normal bowel sounds; No rebound or guarding  Extremities: Normal range of motion, No clubbing, cyanosis or edema  Neurological: Alert and oriented x3  Skin: Warm and dry. No obvious rash      LABS:                        9.7    2.95  )-----------( 257      ( 06 Jan 2024 05:15 )             31.9     06 Jan 2024 05:15    141    |  106    |  12.1   ----------------------------<  80     3.9     |  24.0   |  0.52     Ca    8.7        06 Jan 2024 05:15  Mg     2.0       06 Jan 2024 05:15    TPro  5.8    /  Alb  3.2    /  TBili  1.1    /  DBili  x      /  AST  33     /  ALT  17     /  AlkPhos  176    / Amylase x      /Lipase x      06 Jan 2024 05:15              RADIOLOGY & ADDITIONAL STUDIES:       < from: CT Enterography w/ Oral Cont and w/ IV Cont (01.04.24 @ 12:29) >    ACC: [id number] EXAM:  CT ENTEROGRAPHY OC IC   ORDERED BY: SAHARA QUAN     PROCEDURE DATE:  01/04/2024          INTERPRETATION:  CLINICAL INFORMATION: Anemia. Metastatic breast cancer.    COMPARISON: CT scan abdomen pelvis 1/3/2024.    CONTRAST/COMPLICATIONS:  IV Contrast: Omnipaque 350  88 cc administered   12 cc discarded  Oral Contrast: VoLumen  Complications: None reported at time of study completion    PROCEDURE:  CT of the Abdomen and Pelvis (CT Enterography) was performed with   intravenous contrast in the late arterial phase.  Sagittal and coronal reformats were performed.    FINDINGS:    LOWER CHEST:  Small, poorly defined groundglass opacities visualized bilateral lung   bases.  Possible trace loculated left pleural fluid.    LIVER: Hepatic steatosis.  BILE DUCTS: Normal caliber.  GALLBLADDER: Layering sludge with probable noncalcified dependent   gallstone.  SPLEEN: Mild splenomegaly.  PANCREAS: Within normal limits.  ADRENALS: Within normal limits.  KIDNEYS/URETERS: Withinnormal limits.    BLADDER: Within normal limits.  REPRODUCTIVE ORGANS:  The uterus and adnexa appear within normal limits.    BOWEL:  There is mild concentric wall thickening involving the gastric   antrum/pylorus.  No discrete ulcer is noted.  Thereis mild mucosal hyperenhancement cecum and ascending colon with   minimal pericolic stranding extending along the right paracolic gutter   and posterior flank.  There is generalized colonic fecal retention with mild air distended   rectum.  No bowel obstruction.  PERITONEUM: Trace free fluid pelvis.    VESSELS: Within normal limits.  RETROPERITONEUM/LYMPH NODES: No lymphadenopathy.    ABDOMINAL WALL: Within normal limits.    BONES:  Osteoblastic and osteolytic metastatic disease, stable.  T12 andL1 vertebroplasty.  Spondylolysis L5.    IMPRESSION:    Concentric gastric antrum/pylorus thickening is suggestive of gastritis.    Mucosal hyperenhancement with mild pericolic stranding right colon may   reflect nonspecific colitis.    Other findings as discussed above.    --- End of Report ---            YVONNE HUITRON MD; Attending Radiologist  This document has been electronically signed. Jan 4 2024  3:37PM    < end of copied text >   Patient is a 38y old  Female who presents with a chief complaint of Symptomatic anemia (06 Jan 2024 10:45)      HPI:  39 y/o female with hx of Stage-4 Breast cancer with diffuse osseous mets, prior malignant pericardial effusion, pleural effusion s/p drain, s/p XRT/Chemo with recurrent symptomatic anemia requiring multiple transfusions. Pt was seen and evaluated at bedside. She recently tested positive for RSV, was having some SOB with walking but no cough. No overnight events, She is c/o mild epigastric pain. Hgb remains stable at 9.7, was 7.6 yesterday.    REVIEW OF SYSTEMS:  Constitutional: +fatigue, No fever, weight loss  ENMT:  No difficulty hearing, tinnitus, vertigo; No sinus or throat pain  Respiratory: No cough, wheezing, chills or hemoptysis  Cardiovascular: No chest pain, palpitations, dizziness or leg swelling  Gastrointestinal: +epigastric pain, No nausea, vomiting or hematemesis; No diarrhea or constipation. No melena or hematochezia.  Skin: No itching, burning, rashes or lesions   Musculoskeletal: No joint pain or swelling; No muscle, back or extremity pain    PAST MEDICAL & SURGICAL HISTORY:  H/O compression fracture of spine      Anxiety      Metastatic breast cancer      H/O pleural effusion      Pericardial effusion      S/P tonsillectomy      H/O chest tube placement  12/23/21      S/P pericardiocentesis  12/28/21          FAMILY HISTORY:  FH: CVA (cerebrovascular accident)        SOCIAL HISTORY:  Smoking Status: [ ] Current, [ ] Former, [ ] Never  Pack Years:  [  ] EtOH  [  ] IVDA    MEDICATIONS:  MEDICATIONS  (STANDING):  artificial  tears Solution 1 Drop(s) Both EYES two times a day  chlorhexidine 2% Cloths 1 Application(s) Topical <User Schedule>  FLUoxetine 80 milliGRAM(s) Oral at bedtime  folic acid 1 milliGRAM(s) Oral daily  hydrocortisone hemorrhoidal Suppository 1 Suppository(s) Rectal at bedtime  HYDROmorphone PCA (1 mG/mL) 30 milliLiter(s) PCA Continuous PCA Continuous  memantine 10 milliGRAM(s) Oral two times a day  methadone    Tablet 20 milliGRAM(s) Oral at bedtime  methadone    Tablet 10 milliGRAM(s) Oral <User Schedule>  multivitamin 1 Tablet(s) Oral daily  naloxegol 25 milliGRAM(s) Oral daily  pantoprazole  Injectable 40 milliGRAM(s) IV Push every 12 hours  piperacillin/tazobactam IVPB.. 3.375 Gram(s) IV Intermittent every 8 hours  polyethylene glycol 3350 17 Gram(s) Oral at bedtime  pregabalin 150 milliGRAM(s) Oral three times a day  sodium chloride 0.9% lock flush 3 milliLiter(s) IV Push every 8 hours    MEDICATIONS  (PRN):  acetaminophen     Tablet .. 650 milliGRAM(s) Oral every 6 hours PRN Temp greater or equal to 38C (100.4F), Mild Pain (1 - 3)  aluminum hydroxide/magnesium hydroxide/simethicone Suspension 30 milliLiter(s) Oral every 4 hours PRN Dyspepsia  glycerin Suppository - Adult 1 Suppository(s) Rectal at bedtime PRN Constipation  LORazepam     Tablet 0.5 milliGRAM(s) Oral two times a day PRN Anxiety  melatonin 3 milliGRAM(s) Oral at bedtime PRN Insomnia  naloxone Injectable 0.1 milliGRAM(s) IV Push every 3 minutes PRN For ANY of the following changes in patient status:  A. RR LESS THAN 10 breaths per minute, B. Oxygen saturation LESS THAN 90%, C. Sedation score of 6  ondansetron Injectable 4 milliGRAM(s) IV Push every 8 hours PRN Nausea and/or Vomiting      Allergies    Perjeta (Other (Severe))  pertuzumab (Other (Severe))    Intolerances        Vital Signs Last 24 Hrs  T(C): 36.7 (06 Jan 2024 11:49), Max: 36.7 (05 Jan 2024 16:17)  T(F): 98.1 (06 Jan 2024 11:49), Max: 98.1 (05 Jan 2024 16:17)  HR: 80 (06 Jan 2024 11:49) (80 - 90)  BP: 98/62 (06 Jan 2024 11:49) (98/62 - 109/73)  BP(mean): --  RR: 18 (06 Jan 2024 11:49) (18 - 18)  SpO2: 98% (06 Jan 2024 11:49) (94% - 98%)    Parameters below as of 05 Jan 2024 18:15  Patient On (Oxygen Delivery Method): room air            PHYSICAL EXAM:    General:  in no acute distress  HEENT: MMM, conjunctiva and sclera clear  Gastrointestinal: Soft, non-tender non-distended; Normal bowel sounds; No rebound or guarding  Extremities: Normal range of motion, No clubbing, cyanosis or edema  Neurological: Alert and oriented x3  Skin: Warm and dry. No obvious rash      LABS:                        9.7    2.95  )-----------( 257      ( 06 Jan 2024 05:15 )             31.9     06 Jan 2024 05:15    141    |  106    |  12.1   ----------------------------<  80     3.9     |  24.0   |  0.52     Ca    8.7        06 Jan 2024 05:15  Mg     2.0       06 Jan 2024 05:15    TPro  5.8    /  Alb  3.2    /  TBili  1.1    /  DBili  x      /  AST  33     /  ALT  17     /  AlkPhos  176    / Amylase x      /Lipase x      06 Jan 2024 05:15              RADIOLOGY & ADDITIONAL STUDIES:       < from: CT Enterography w/ Oral Cont and w/ IV Cont (01.04.24 @ 12:29) >    ACC: [id number] EXAM:  CT ENTEROGRAPHY OC IC   ORDERED BY: SAHARA QUAN     PROCEDURE DATE:  01/04/2024          INTERPRETATION:  CLINICAL INFORMATION: Anemia. Metastatic breast cancer.    COMPARISON: CT scan abdomen pelvis 1/3/2024.    CONTRAST/COMPLICATIONS:  IV Contrast: Omnipaque 350  88 cc administered   12 cc discarded  Oral Contrast: VoLumen  Complications: None reported at time of study completion    PROCEDURE:  CT of the Abdomen and Pelvis (CT Enterography) was performed with   intravenous contrast in the late arterial phase.  Sagittal and coronal reformats were performed.    FINDINGS:    LOWER CHEST:  Small, poorly defined groundglass opacities visualized bilateral lung   bases.  Possible trace loculated left pleural fluid.    LIVER: Hepatic steatosis.  BILE DUCTS: Normal caliber.  GALLBLADDER: Layering sludge with probable noncalcified dependent   gallstone.  SPLEEN: Mild splenomegaly.  PANCREAS: Within normal limits.  ADRENALS: Within normal limits.  KIDNEYS/URETERS: Withinnormal limits.    BLADDER: Within normal limits.  REPRODUCTIVE ORGANS:  The uterus and adnexa appear within normal limits.    BOWEL:  There is mild concentric wall thickening involving the gastric   antrum/pylorus.  No discrete ulcer is noted.  Thereis mild mucosal hyperenhancement cecum and ascending colon with   minimal pericolic stranding extending along the right paracolic gutter   and posterior flank.  There is generalized colonic fecal retention with mild air distended   rectum.  No bowel obstruction.  PERITONEUM: Trace free fluid pelvis.    VESSELS: Within normal limits.  RETROPERITONEUM/LYMPH NODES: No lymphadenopathy.    ABDOMINAL WALL: Within normal limits.    BONES:  Osteoblastic and osteolytic metastatic disease, stable.  T12 andL1 vertebroplasty.  Spondylolysis L5.    IMPRESSION:    Concentric gastric antrum/pylorus thickening is suggestive of gastritis.    Mucosal hyperenhancement with mild pericolic stranding right colon may   reflect nonspecific colitis.    Other findings as discussed above.    --- End of Report ---            YVONNE HUITRON MD; Attending Radiologist  This document has been electronically signed. Jan 4 2024  3:37PM    < end of copied text >

## 2024-01-06 NOTE — PROGRESS NOTE ADULT - SUBJECTIVE AND OBJECTIVE BOX
39 yo F known patient of Dr Chapa at [facility], metastatic breast ca on Enhertu and with prior GIB requiring blood transfusion is admitted to the hospital again due to symptomatic anemia.  Her hemoglobin was found to be 7.5.  She received 1 unit of PRBC and hemoglobin is 8.2 now.  She also has chronic cancer-related pain noted in her back.  She says that in the emergency room she received Dilaudid 2 mg IV which seem to help but says that as inpatient she received a dose of Dilaudid 1 mg which was not enough and would like to request a higher dose.  She says that last night her bowel movement was very dark and she feels it was likely mixed with blood.   She had a PET-CT and brain MRI done within the past few days which overall showed stable disease. Colitis noted on CT. She has been started on IV antibiotics.       CTE showing gastritis  afebrile  Hgb 9.7  plan for EGD        MEDICATIONS  (STANDING):  artificial  tears Solution 1 Drop(s) Both EYES two times a day  chlorhexidine 2% Cloths 1 Application(s) Topical <User Schedule>  FLUoxetine 80 milliGRAM(s) Oral at bedtime  folic acid 1 milliGRAM(s) Oral daily  hydrocortisone hemorrhoidal Suppository 1 Suppository(s) Rectal at bedtime  HYDROmorphone PCA (1 mG/mL) 30 milliLiter(s) PCA Continuous PCA Continuous  memantine 10 milliGRAM(s) Oral two times a day  methadone    Tablet 10 milliGRAM(s) Oral <User Schedule>  methadone    Tablet 20 milliGRAM(s) Oral at bedtime  multivitamin 1 Tablet(s) Oral daily  naloxegol 25 milliGRAM(s) Oral daily  pantoprazole  Injectable 40 milliGRAM(s) IV Push every 12 hours  piperacillin/tazobactam IVPB.. 3.375 Gram(s) IV Intermittent every 8 hours  polyethylene glycol 3350 17 Gram(s) Oral at bedtime  pregabalin 150 milliGRAM(s) Oral three times a day  sodium chloride 0.9% lock flush 3 milliLiter(s) IV Push every 8 hours    MEDICATIONS  (PRN):  acetaminophen     Tablet .. 650 milliGRAM(s) Oral every 6 hours PRN Temp greater or equal to 38C (100.4F), Mild Pain (1 - 3)  aluminum hydroxide/magnesium hydroxide/simethicone Suspension 30 milliLiter(s) Oral every 4 hours PRN Dyspepsia  glycerin Suppository - Adult 1 Suppository(s) Rectal at bedtime PRN Constipation  LORazepam     Tablet 0.5 milliGRAM(s) Oral two times a day PRN Anxiety  melatonin 3 milliGRAM(s) Oral at bedtime PRN Insomnia  naloxone Injectable 0.1 milliGRAM(s) IV Push every 3 minutes PRN For ANY of the following changes in patient status:  A. RR LESS THAN 10 breaths per minute, B. Oxygen saturation LESS THAN 90%, C. Sedation score of 6  ondansetron Injectable 4 milliGRAM(s) IV Push every 8 hours PRN Nausea and/or Vomiting        ICU Vital Signs Last 24 Hrs  T(C): 36.6 (06 Jan 2024 04:56), Max: 36.7 (05 Jan 2024 16:17)  T(F): 97.9 (06 Jan 2024 04:56), Max: 98.1 (05 Jan 2024 16:17)  HR: 80 (06 Jan 2024 04:56) (80 - 90)  BP: 106/64 (06 Jan 2024 04:56) (101/55 - 109/73)  BP(mean): --  ABP: --  ABP(mean): --  RR: 18 (06 Jan 2024 04:56) (18 - 18)  SpO2: 96% (06 Jan 2024 04:56) (94% - 96%)    O2 Parameters below as of 05 Jan 2024 18:15  Patient On (Oxygen Delivery Method): room air      Gen - alert and oriented  Heart - s1s2 RRR  Lung - CTA b/l  Abd - mild tender  Ext no edema               Labs:                          9.7    2.95  )-----------( 257      ( 06 Jan 2024 05:15 )             31.9                           7.6    2.80  )-----------( 249      ( 05 Jan 2024 05:51 )             25.3                           9.1    3.70  )-----------( 345      ( 04 Jan 2024 06:16 )             29.3                           8.2    3.34  )-----------( 250      ( 03 Jan 2024 07:28 )             26.8     01-06    141  |  106  |  12.1  ----------------------------<  80  3.9   |  24.0  |  0.52    Ca    8.7      06 Jan 2024 05:15  Mg     2.0     01-06    TPro  5.8<L>  /  Alb  3.2<L>  /  TBili  1.1  /  DBili  x   /  AST  33<H>  /  ALT  17  /  AlkPhos  176<H>  01-06 01-05    140  |  106  |  12.8  ----------------------------<  79  3.4<L>   |  23.0  |  0.45<L>    Ca    8.4      05 Jan 2024 05:51    TPro  5.1<L>  /  Alb  3.0<L>  /  TBili  0.7  /  DBili  x   /  AST  32<H>  /  ALT  14  /  AlkPhos  157<H>  01-05 01-03    142  |  110<H>  |  14.1  ----------------------------<  92  3.5   |  23.0  |  0.48<L>    Ca    8.2<L>      03 Jan 2024 07:28  Phos  3.7     01-03  Mg     1.9     01-03    TPro  5.6<L>  /  Alb  3.4  /  TBili  0.9  /  DBili  x   /  AST  35<H>  /  ALT  16  /  AlkPhos  192<H>  01-02   39 yo F known patient of Dr Chapa at [facility], metastatic breast ca on Enhertu and with prior GIB requiring blood transfusion is admitted to the hospital again due to symptomatic anemia.  Her hemoglobin was found to be 7.5.  She received 1 unit of PRBC and hemoglobin is 8.2 now.  She also has chronic cancer-related pain noted in her back.  She says that in the emergency room she received Dilaudid 2 mg IV which seem to help but says that as inpatient she received a dose of Dilaudid 1 mg which was not enough and would like to request a higher dose.  She says that last night her bowel movement was very dark and she feels it was likely mixed with blood.   She had a PET-CT and brain MRI done within the past few days which overall showed stable disease. Colitis noted on CT. She has been started on IV antibiotics.       CTE showing gastritis  afebrile  Hgb 9.7  plan for EGD        MEDICATIONS  (STANDING):  artificial  tears Solution 1 Drop(s) Both EYES two times a day  chlorhexidine 2% Cloths 1 Application(s) Topical <User Schedule>  FLUoxetine 80 milliGRAM(s) Oral at bedtime  folic acid 1 milliGRAM(s) Oral daily  hydrocortisone hemorrhoidal Suppository 1 Suppository(s) Rectal at bedtime  HYDROmorphone PCA (1 mG/mL) 30 milliLiter(s) PCA Continuous PCA Continuous  memantine 10 milliGRAM(s) Oral two times a day  methadone    Tablet 10 milliGRAM(s) Oral <User Schedule>  methadone    Tablet 20 milliGRAM(s) Oral at bedtime  multivitamin 1 Tablet(s) Oral daily  naloxegol 25 milliGRAM(s) Oral daily  pantoprazole  Injectable 40 milliGRAM(s) IV Push every 12 hours  piperacillin/tazobactam IVPB.. 3.375 Gram(s) IV Intermittent every 8 hours  polyethylene glycol 3350 17 Gram(s) Oral at bedtime  pregabalin 150 milliGRAM(s) Oral three times a day  sodium chloride 0.9% lock flush 3 milliLiter(s) IV Push every 8 hours    MEDICATIONS  (PRN):  acetaminophen     Tablet .. 650 milliGRAM(s) Oral every 6 hours PRN Temp greater or equal to 38C (100.4F), Mild Pain (1 - 3)  aluminum hydroxide/magnesium hydroxide/simethicone Suspension 30 milliLiter(s) Oral every 4 hours PRN Dyspepsia  glycerin Suppository - Adult 1 Suppository(s) Rectal at bedtime PRN Constipation  LORazepam     Tablet 0.5 milliGRAM(s) Oral two times a day PRN Anxiety  melatonin 3 milliGRAM(s) Oral at bedtime PRN Insomnia  naloxone Injectable 0.1 milliGRAM(s) IV Push every 3 minutes PRN For ANY of the following changes in patient status:  A. RR LESS THAN 10 breaths per minute, B. Oxygen saturation LESS THAN 90%, C. Sedation score of 6  ondansetron Injectable 4 milliGRAM(s) IV Push every 8 hours PRN Nausea and/or Vomiting        ICU Vital Signs Last 24 Hrs  T(C): 36.6 (06 Jan 2024 04:56), Max: 36.7 (05 Jan 2024 16:17)  T(F): 97.9 (06 Jan 2024 04:56), Max: 98.1 (05 Jan 2024 16:17)  HR: 80 (06 Jan 2024 04:56) (80 - 90)  BP: 106/64 (06 Jan 2024 04:56) (101/55 - 109/73)  BP(mean): --  ABP: --  ABP(mean): --  RR: 18 (06 Jan 2024 04:56) (18 - 18)  SpO2: 96% (06 Jan 2024 04:56) (94% - 96%)    O2 Parameters below as of 05 Jan 2024 18:15  Patient On (Oxygen Delivery Method): room air      Gen - alert and oriented  Heart - s1s2 RRR  Lung - CTA b/l  Abd - mild tender  Ext no edema               Labs:                          9.7    2.95  )-----------( 257      ( 06 Jan 2024 05:15 )             31.9                           7.6    2.80  )-----------( 249      ( 05 Jan 2024 05:51 )             25.3                           9.1    3.70  )-----------( 345      ( 04 Jan 2024 06:16 )             29.3                           8.2    3.34  )-----------( 250      ( 03 Jan 2024 07:28 )             26.8     01-06    141  |  106  |  12.1  ----------------------------<  80  3.9   |  24.0  |  0.52    Ca    8.7      06 Jan 2024 05:15  Mg     2.0     01-06    TPro  5.8<L>  /  Alb  3.2<L>  /  TBili  1.1  /  DBili  x   /  AST  33<H>  /  ALT  17  /  AlkPhos  176<H>  01-06 01-05    140  |  106  |  12.8  ----------------------------<  79  3.4<L>   |  23.0  |  0.45<L>    Ca    8.4      05 Jan 2024 05:51    TPro  5.1<L>  /  Alb  3.0<L>  /  TBili  0.7  /  DBili  x   /  AST  32<H>  /  ALT  14  /  AlkPhos  157<H>  01-05 01-03    142  |  110<H>  |  14.1  ----------------------------<  92  3.5   |  23.0  |  0.48<L>    Ca    8.2<L>      03 Jan 2024 07:28  Phos  3.7     01-03  Mg     1.9     01-03    TPro  5.6<L>  /  Alb  3.4  /  TBili  0.9  /  DBili  x   /  AST  35<H>  /  ALT  16  /  AlkPhos  192<H>  01-02

## 2024-01-07 DIAGNOSIS — K92.1 MELENA: ICD-10-CM

## 2024-01-07 LAB
ANION GAP SERPL CALC-SCNC: 9 MMOL/L — SIGNIFICANT CHANGE UP (ref 5–17)
ANION GAP SERPL CALC-SCNC: 9 MMOL/L — SIGNIFICANT CHANGE UP (ref 5–17)
BASOPHILS # BLD AUTO: 0.01 K/UL — SIGNIFICANT CHANGE UP (ref 0–0.2)
BASOPHILS # BLD AUTO: 0.01 K/UL — SIGNIFICANT CHANGE UP (ref 0–0.2)
BASOPHILS NFR BLD AUTO: 0.3 % — SIGNIFICANT CHANGE UP (ref 0–2)
BASOPHILS NFR BLD AUTO: 0.3 % — SIGNIFICANT CHANGE UP (ref 0–2)
BUN SERPL-MCNC: 12.8 MG/DL — SIGNIFICANT CHANGE UP (ref 8–20)
BUN SERPL-MCNC: 12.8 MG/DL — SIGNIFICANT CHANGE UP (ref 8–20)
CALCIUM SERPL-MCNC: 8.1 MG/DL — LOW (ref 8.4–10.5)
CALCIUM SERPL-MCNC: 8.1 MG/DL — LOW (ref 8.4–10.5)
CHLORIDE SERPL-SCNC: 107 MMOL/L — SIGNIFICANT CHANGE UP (ref 96–108)
CHLORIDE SERPL-SCNC: 107 MMOL/L — SIGNIFICANT CHANGE UP (ref 96–108)
CO2 SERPL-SCNC: 25 MMOL/L — SIGNIFICANT CHANGE UP (ref 22–29)
CO2 SERPL-SCNC: 25 MMOL/L — SIGNIFICANT CHANGE UP (ref 22–29)
CREAT SERPL-MCNC: 0.59 MG/DL — SIGNIFICANT CHANGE UP (ref 0.5–1.3)
CREAT SERPL-MCNC: 0.59 MG/DL — SIGNIFICANT CHANGE UP (ref 0.5–1.3)
EGFR: 119 ML/MIN/1.73M2 — SIGNIFICANT CHANGE UP
EGFR: 119 ML/MIN/1.73M2 — SIGNIFICANT CHANGE UP
EOSINOPHIL # BLD AUTO: 0.14 K/UL — SIGNIFICANT CHANGE UP (ref 0–0.5)
EOSINOPHIL # BLD AUTO: 0.14 K/UL — SIGNIFICANT CHANGE UP (ref 0–0.5)
EOSINOPHIL NFR BLD AUTO: 4.2 % — SIGNIFICANT CHANGE UP (ref 0–6)
EOSINOPHIL NFR BLD AUTO: 4.2 % — SIGNIFICANT CHANGE UP (ref 0–6)
GLUCOSE SERPL-MCNC: 78 MG/DL — SIGNIFICANT CHANGE UP (ref 70–99)
GLUCOSE SERPL-MCNC: 78 MG/DL — SIGNIFICANT CHANGE UP (ref 70–99)
HCT VFR BLD CALC: 29.4 % — LOW (ref 34.5–45)
HCT VFR BLD CALC: 29.4 % — LOW (ref 34.5–45)
HGB BLD-MCNC: 8.9 G/DL — LOW (ref 11.5–15.5)
HGB BLD-MCNC: 8.9 G/DL — LOW (ref 11.5–15.5)
IMM GRANULOCYTES NFR BLD AUTO: 0.9 % — SIGNIFICANT CHANGE UP (ref 0–0.9)
IMM GRANULOCYTES NFR BLD AUTO: 0.9 % — SIGNIFICANT CHANGE UP (ref 0–0.9)
INR BLD: 1.02 RATIO — SIGNIFICANT CHANGE UP (ref 0.85–1.18)
INR BLD: 1.02 RATIO — SIGNIFICANT CHANGE UP (ref 0.85–1.18)
LYMPHOCYTES # BLD AUTO: 1.15 K/UL — SIGNIFICANT CHANGE UP (ref 1–3.3)
LYMPHOCYTES # BLD AUTO: 1.15 K/UL — SIGNIFICANT CHANGE UP (ref 1–3.3)
LYMPHOCYTES # BLD AUTO: 34.2 % — SIGNIFICANT CHANGE UP (ref 13–44)
LYMPHOCYTES # BLD AUTO: 34.2 % — SIGNIFICANT CHANGE UP (ref 13–44)
MAGNESIUM SERPL-MCNC: 2 MG/DL — SIGNIFICANT CHANGE UP (ref 1.6–2.6)
MAGNESIUM SERPL-MCNC: 2 MG/DL — SIGNIFICANT CHANGE UP (ref 1.6–2.6)
MCHC RBC-ENTMCNC: 27.8 PG — SIGNIFICANT CHANGE UP (ref 27–34)
MCHC RBC-ENTMCNC: 27.8 PG — SIGNIFICANT CHANGE UP (ref 27–34)
MCHC RBC-ENTMCNC: 30.3 GM/DL — LOW (ref 32–36)
MCHC RBC-ENTMCNC: 30.3 GM/DL — LOW (ref 32–36)
MCV RBC AUTO: 91.9 FL — SIGNIFICANT CHANGE UP (ref 80–100)
MCV RBC AUTO: 91.9 FL — SIGNIFICANT CHANGE UP (ref 80–100)
MONOCYTES # BLD AUTO: 0.48 K/UL — SIGNIFICANT CHANGE UP (ref 0–0.9)
MONOCYTES # BLD AUTO: 0.48 K/UL — SIGNIFICANT CHANGE UP (ref 0–0.9)
MONOCYTES NFR BLD AUTO: 14.3 % — HIGH (ref 2–14)
MONOCYTES NFR BLD AUTO: 14.3 % — HIGH (ref 2–14)
NEUTROPHILS # BLD AUTO: 1.55 K/UL — LOW (ref 1.8–7.4)
NEUTROPHILS # BLD AUTO: 1.55 K/UL — LOW (ref 1.8–7.4)
NEUTROPHILS NFR BLD AUTO: 46.1 % — SIGNIFICANT CHANGE UP (ref 43–77)
NEUTROPHILS NFR BLD AUTO: 46.1 % — SIGNIFICANT CHANGE UP (ref 43–77)
PHOSPHATE SERPL-MCNC: 3.5 MG/DL — SIGNIFICANT CHANGE UP (ref 2.4–4.7)
PHOSPHATE SERPL-MCNC: 3.5 MG/DL — SIGNIFICANT CHANGE UP (ref 2.4–4.7)
PLATELET # BLD AUTO: 314 K/UL — SIGNIFICANT CHANGE UP (ref 150–400)
PLATELET # BLD AUTO: 314 K/UL — SIGNIFICANT CHANGE UP (ref 150–400)
POTASSIUM SERPL-MCNC: 3.8 MMOL/L — SIGNIFICANT CHANGE UP (ref 3.5–5.3)
POTASSIUM SERPL-MCNC: 3.8 MMOL/L — SIGNIFICANT CHANGE UP (ref 3.5–5.3)
POTASSIUM SERPL-SCNC: 3.8 MMOL/L — SIGNIFICANT CHANGE UP (ref 3.5–5.3)
POTASSIUM SERPL-SCNC: 3.8 MMOL/L — SIGNIFICANT CHANGE UP (ref 3.5–5.3)
PROTHROM AB SERPL-ACNC: 11.3 SEC — SIGNIFICANT CHANGE UP (ref 9.5–13)
PROTHROM AB SERPL-ACNC: 11.3 SEC — SIGNIFICANT CHANGE UP (ref 9.5–13)
RBC # BLD: 3.2 M/UL — LOW (ref 3.8–5.2)
RBC # BLD: 3.2 M/UL — LOW (ref 3.8–5.2)
RBC # FLD: 20.9 % — HIGH (ref 10.3–14.5)
RBC # FLD: 20.9 % — HIGH (ref 10.3–14.5)
SODIUM SERPL-SCNC: 141 MMOL/L — SIGNIFICANT CHANGE UP (ref 135–145)
SODIUM SERPL-SCNC: 141 MMOL/L — SIGNIFICANT CHANGE UP (ref 135–145)
WBC # BLD: 3.36 K/UL — LOW (ref 3.8–10.5)
WBC # BLD: 3.36 K/UL — LOW (ref 3.8–10.5)
WBC # FLD AUTO: 3.36 K/UL — LOW (ref 3.8–10.5)
WBC # FLD AUTO: 3.36 K/UL — LOW (ref 3.8–10.5)

## 2024-01-07 PROCEDURE — 99233 SBSQ HOSP IP/OBS HIGH 50: CPT

## 2024-01-07 PROCEDURE — 99232 SBSQ HOSP IP/OBS MODERATE 35: CPT

## 2024-01-07 RX ORDER — ACETAMINOPHEN 500 MG
1000 TABLET ORAL ONCE
Refills: 0 | Status: COMPLETED | OUTPATIENT
Start: 2024-01-07 | End: 2024-01-07

## 2024-01-07 RX ADMIN — SODIUM CHLORIDE 3 MILLILITER(S): 9 INJECTION INTRAMUSCULAR; INTRAVENOUS; SUBCUTANEOUS at 05:53

## 2024-01-07 RX ADMIN — Medication 1 DROP(S): at 05:55

## 2024-01-07 RX ADMIN — CHLORHEXIDINE GLUCONATE 1 APPLICATION(S): 213 SOLUTION TOPICAL at 05:54

## 2024-01-07 RX ADMIN — METHADONE HYDROCHLORIDE 20 MILLIGRAM(S): 40 TABLET ORAL at 21:29

## 2024-01-07 RX ADMIN — PIPERACILLIN AND TAZOBACTAM 25 GRAM(S): 4; .5 INJECTION, POWDER, LYOPHILIZED, FOR SOLUTION INTRAVENOUS at 05:53

## 2024-01-07 RX ADMIN — PANTOPRAZOLE SODIUM 40 MILLIGRAM(S): 20 TABLET, DELAYED RELEASE ORAL at 17:48

## 2024-01-07 RX ADMIN — Medication 80 MILLIGRAM(S): at 21:29

## 2024-01-07 RX ADMIN — Medication 150 MILLIGRAM(S): at 13:47

## 2024-01-07 RX ADMIN — HYDROMORPHONE HYDROCHLORIDE 30 MILLILITER(S): 2 INJECTION INTRAMUSCULAR; INTRAVENOUS; SUBCUTANEOUS at 20:01

## 2024-01-07 RX ADMIN — PIPERACILLIN AND TAZOBACTAM 25 GRAM(S): 4; .5 INJECTION, POWDER, LYOPHILIZED, FOR SOLUTION INTRAVENOUS at 21:32

## 2024-01-07 RX ADMIN — NALOXEGOL OXALATE 25 MILLIGRAM(S): 12.5 TABLET, FILM COATED ORAL at 13:48

## 2024-01-07 RX ADMIN — MEMANTINE HYDROCHLORIDE 10 MILLIGRAM(S): 10 TABLET ORAL at 05:53

## 2024-01-07 RX ADMIN — Medication 0.5 MILLIGRAM(S): at 21:30

## 2024-01-07 RX ADMIN — HYDROMORPHONE HYDROCHLORIDE 30 MILLILITER(S): 2 INJECTION INTRAMUSCULAR; INTRAVENOUS; SUBCUTANEOUS at 07:27

## 2024-01-07 RX ADMIN — PIPERACILLIN AND TAZOBACTAM 25 GRAM(S): 4; .5 INJECTION, POWDER, LYOPHILIZED, FOR SOLUTION INTRAVENOUS at 14:03

## 2024-01-07 RX ADMIN — Medication 150 MILLIGRAM(S): at 21:30

## 2024-01-07 RX ADMIN — Medication 3 MILLIGRAM(S): at 21:29

## 2024-01-07 RX ADMIN — Medication 400 MILLIGRAM(S): at 17:48

## 2024-01-07 RX ADMIN — Medication 1 DROP(S): at 17:42

## 2024-01-07 RX ADMIN — Medication 150 MILLIGRAM(S): at 05:53

## 2024-01-07 RX ADMIN — METHADONE HYDROCHLORIDE 10 MILLIGRAM(S): 40 TABLET ORAL at 05:53

## 2024-01-07 RX ADMIN — Medication 1 TABLET(S): at 13:47

## 2024-01-07 RX ADMIN — PANTOPRAZOLE SODIUM 40 MILLIGRAM(S): 20 TABLET, DELAYED RELEASE ORAL at 05:54

## 2024-01-07 RX ADMIN — SODIUM CHLORIDE 3 MILLILITER(S): 9 INJECTION INTRAMUSCULAR; INTRAVENOUS; SUBCUTANEOUS at 21:32

## 2024-01-07 RX ADMIN — MEMANTINE HYDROCHLORIDE 10 MILLIGRAM(S): 10 TABLET ORAL at 17:48

## 2024-01-07 RX ADMIN — SODIUM CHLORIDE 3 MILLILITER(S): 9 INJECTION INTRAMUSCULAR; INTRAVENOUS; SUBCUTANEOUS at 13:43

## 2024-01-07 RX ADMIN — Medication 1 MILLIGRAM(S): at 13:46

## 2024-01-07 RX ADMIN — METHADONE HYDROCHLORIDE 10 MILLIGRAM(S): 40 TABLET ORAL at 13:46

## 2024-01-07 NOTE — PROGRESS NOTE ADULT - ASSESSMENT
Patient is a 39 y/o female with history of Stage-4 breast cancer admitted with recurrent symptomatic anemia.    1. Recurrent symptomatic anemia  -Multi-factorial from likely gastritis/colitis, prior chemo, active malignancy; rule out GIB  -Hb relatively stable s/p 2 units of PRBC  -Iron studies reviewed  -Prior colonoscopy reviewed in 11/23  -Continue Anusol suppositories for hemorrhoids daily  -Reports enteritis on Capsular endoscopy   -CTE with gastritis and colitis  -Continue PPI  -GI PCR not collected  -Continue empiric zosyn  -NPO after midnight for EGD on Monday  -GI recs appreciated    2. Leukopenia  -ANC > 1000  -Likely related to malignancy/prior chemo  -Monitor CBC  -hem/onc recs appreciated    3. Stage-4 Breast cancer with brain mets  - Continue Dilaudid PCA   - Continue Methadone, Lyrica  - Continue Namenda  - pain management on board  - hem/onc recs appreciated    4. Anxiety   - continue Prozac    5. Constipation  - continue Movantik and miralax    DVT ppx - SCDs    Dispo- Remains acute; NPO after midnight for EGD on 1/8.    Plan discussed with patient, RN

## 2024-01-07 NOTE — PROGRESS NOTE ADULT - SUBJECTIVE AND OBJECTIVE BOX
CHIEF COMPLAINT/INTERVAL HISTORY: LATE ENTRY    Patient is a 38y old  Female who presents with a chief complaint of Symptomatic anemia (07 Jan 2024 14:57)    SUBJECTIVE & OBJECTIVE: Pt seen and examined at bedside. No overnight events. Reports feeling better today. Denied any abdominal pain earlier today.    ROS: No chest pain, palpitations, SOB, light headedness, dizziness, headache, nausea/vomiting, fevers/chills, abdominal pain, dysuria or increased urinary frequency.    ICU Vital Signs Last 24 Hrs  T(C): 36.8 (07 Jan 2024 16:49), Max: 36.9 (06 Jan 2024 18:23)  T(F): 98.2 (07 Jan 2024 16:49), Max: 98.4 (06 Jan 2024 18:23)  HR: 84 (07 Jan 2024 16:49) (78 - 84)  BP: 96/60 (07 Jan 2024 16:49) (94/58 - 104/68)  RR: 18 (07 Jan 2024 16:49) (18 - 18)  SpO2: 98% (07 Jan 2024 16:49) (94% - 99%)    O2 Parameters below as of 07 Jan 2024 16:49  Patient On (Oxygen Delivery Method): room air    MEDICATIONS  (STANDING):  acetaminophen   IVPB .. 1000 milliGRAM(s) IV Intermittent once  artificial  tears Solution 1 Drop(s) Both EYES two times a day  chlorhexidine 2% Cloths 1 Application(s) Topical <User Schedule>  FLUoxetine 80 milliGRAM(s) Oral at bedtime  folic acid 1 milliGRAM(s) Oral daily  hydrocortisone hemorrhoidal Suppository 1 Suppository(s) Rectal at bedtime  HYDROmorphone PCA (1 mG/mL) 30 milliLiter(s) PCA Continuous PCA Continuous  memantine 10 milliGRAM(s) Oral two times a day  methadone    Tablet 20 milliGRAM(s) Oral at bedtime  methadone    Tablet 10 milliGRAM(s) Oral <User Schedule>  multivitamin 1 Tablet(s) Oral daily  naloxegol 25 milliGRAM(s) Oral daily  pantoprazole  Injectable 40 milliGRAM(s) IV Push every 12 hours  piperacillin/tazobactam IVPB.. 3.375 Gram(s) IV Intermittent every 8 hours  polyethylene glycol 3350 17 Gram(s) Oral at bedtime  pregabalin 150 milliGRAM(s) Oral three times a day  sodium chloride 0.9% lock flush 3 milliLiter(s) IV Push every 8 hours    MEDICATIONS  (PRN):  acetaminophen     Tablet .. 650 milliGRAM(s) Oral every 6 hours PRN Temp greater or equal to 38C (100.4F), Mild Pain (1 - 3)  aluminum hydroxide/magnesium hydroxide/simethicone Suspension 30 milliLiter(s) Oral every 4 hours PRN Dyspepsia  glycerin Suppository - Adult 1 Suppository(s) Rectal at bedtime PRN Constipation  LORazepam     Tablet 0.5 milliGRAM(s) Oral two times a day PRN Anxiety  melatonin 3 milliGRAM(s) Oral at bedtime PRN Insomnia  naloxone Injectable 0.1 milliGRAM(s) IV Push every 3 minutes PRN For ANY of the following changes in patient status:  A. RR LESS THAN 10 breaths per minute, B. Oxygen saturation LESS THAN 90%, C. Sedation score of 6  ondansetron Injectable 4 milliGRAM(s) IV Push every 8 hours PRN Nausea and/or Vomiting      LABS:                        8.9    3.36  )-----------( 314      ( 07 Jan 2024 07:33 )             29.4     01-07    141  |  107  |  12.8  ----------------------------<  78  3.8   |  25.0  |  0.59    Ca    8.1<L>      07 Jan 2024 07:33  Phos  3.5     01-07  Mg     2.0     01-07    TPro  5.8<L>  /  Alb  3.2<L>  /  TBili  1.1  /  DBili  x   /  AST  33<H>  /  ALT  17  /  AlkPhos  176<H>  01-06    PT/INR - ( 07 Jan 2024 07:33 )   PT: 11.3 sec;   INR: 1.02 ratio           Urinalysis Basic - ( 07 Jan 2024 07:33 )    Color: x / Appearance: x / SG: x / pH: x  Gluc: 78 mg/dL / Ketone: x  / Bili: x / Urobili: x   Blood: x / Protein: x / Nitrite: x   Leuk Esterase: x / RBC: x / WBC x   Sq Epi: x / Non Sq Epi: x / Bacteria: x         PHYSICAL EXAM:    GENERAL: middle aged female, sitting in bed, NAD  HEAD:  Atraumatic, Normocephalic  EYES: EOMI, PERRLA, conjunctiva and sclera clear  ENMT: Moist mucous membranes  NECK: Supple  NERVOUS SYSTEM:  Alert & Oriented X3, Motor Strength 5/5 B/L upper and lower extremities   CHEST/LUNG: Clear to auscultation bilaterally   HEART: Regular rate and rhythm; +S1/S2  ABDOMEN: Soft, Nontender, Nondistended; Bowel sounds present  EXTREMITIES:  no pedal edema

## 2024-01-07 NOTE — PROGRESS NOTE ADULT - PROBLEM SELECTOR PLAN 1
- 1/4/2024 CT A/P (CT Enterography): mild concentric wall thickening involving gastric antrum/pylorus, mild mucosal enhancement cecum and ascending colon with minimal pericolic stranding extending along the right paracolic gutter and posterior plank, generalized colonic fecal retention with mild air distended rectum, no bowel obstruction  - Hgb stable today, 8.9 g  - Trend and transfuse as needed  - Avoid NSAIDs  - Continue Pantoprazole  - EGD tentatively planned for Monday 1/8/2023  - NPO midnight on Sunday

## 2024-01-07 NOTE — CHART NOTE - NSCHARTNOTEFT_GEN_A_CORE
Asked to see pt. c/o right abdominal pain- RUQ/RLQ pain. Pt. states she has had similar type of pain before, but now its sharp, consistent, constant, sometimes gets better, but always there.  Pt. denies chills/fever, diaphoresis, SOB, CP, N/V/D/C, bladder discomfort, dysuria, back pain, leg pain  Pt. has been followed by GI for gastritis, colitis/enteritis, and constipation.  Pt. s/p prior chemo, active malignancy;    Vital Signs Last 24 Hrs  T(C): 36.8 (07 Jan 2024 16:49), Max: 36.9 (06 Jan 2024 18:23)  T(F): 98.2 (07 Jan 2024 16:49), Max: 98.4 (06 Jan 2024 18:23)  HR: 84 (07 Jan 2024 16:49) (78 - 84)  BP: 96/60 (07 Jan 2024 16:49) (94/58 - 104/68)  RR: 18 (07 Jan 2024 16:49) (18 - 18)  SpO2: 98% (07 Jan 2024 16:49) (94% - 99%)    Parameters below as of 07 Jan 2024 16:49  Patient On (Oxygen Delivery Method): room air    Abd: + BS, soft, ND, mildly tender RUQ/RLQ    Patient is a 37 y/o female with history of Stage-4 breast cancer admitted with recurrent symptomatic anemia.  Presently on IV Protonix 40mg Q12H  on Movantik and miralax for constipation (had moderate formed BM now)  Scheduled for EGD in am  on PCA pump, methadone, Lyrica  Ordered Ofirmev  Monitor vitals  Recall for any concerns or changes in pt's condition  Discussed above w/Medicine Attending and agreed w/above Asked to see pt. c/o right abdominal pain- RUQ/RLQ pain. Pt. states she has had similar type of pain before, but now its sharp, consistent, constant, sometimes gets better, but always there.  Pt. denies chills/fever, diaphoresis, SOB, CP, N/V/D/C, bladder discomfort, dysuria, back pain, leg pain  Pt. has been followed by GI for gastritis, colitis/enteritis, and constipation.  Pt. s/p prior chemo, active malignancy;    Vital Signs Last 24 Hrs  T(C): 36.8 (07 Jan 2024 16:49), Max: 36.9 (06 Jan 2024 18:23)  T(F): 98.2 (07 Jan 2024 16:49), Max: 98.4 (06 Jan 2024 18:23)  HR: 84 (07 Jan 2024 16:49) (78 - 84)  BP: 96/60 (07 Jan 2024 16:49) (94/58 - 104/68)  RR: 18 (07 Jan 2024 16:49) (18 - 18)  SpO2: 98% (07 Jan 2024 16:49) (94% - 99%)    Parameters below as of 07 Jan 2024 16:49  Patient On (Oxygen Delivery Method): room air    Abd: + BS, soft, ND, mildly tender RUQ/RLQ    Patient is a 39 y/o female with history of Stage-4 breast cancer admitted with recurrent symptomatic anemia.  Presently on IV Protonix 40mg Q12H  on Movantik and miralax for constipation (had moderate formed BM now)  Scheduled for EGD in am  on PCA pump, methadone, Lyrica  Ordered Ofirmev  Monitor vitals  Recall for any concerns or changes in pt's condition  Discussed above w/Medicine Attending and agreed w/above

## 2024-01-07 NOTE — PROGRESS NOTE ADULT - SUBJECTIVE AND OBJECTIVE BOX
38y old  Female who presents with a chief complaint of Symptomatic anemia (07 Jan 2024 10:45)      HPI:  37 y/o female with hx of Stage-4 Breast cancer with diffuse osseous mets, prior malignant pericardial effusion, pleural effusion s/p drain, s/p XRT/Chemo with recurrent symptomatic anemia requiring multiple transfusions. Pt was seen and evaluated at bedside. She recently tested positive for RSV, was having some SOB with walking but no cough. No overnight events, She is c/o mild epigastric pain. Hgb remains stable at 9.7, was 7.6 yesterday.      24 hour events  No reports melena, no hematochezia, appetite fair      REVIEW OF SYSTEMS:  Constitutional: No fever, weight loss + fatigue  ENMT:  No difficulty hearing, tinnitus, vertigo; No sinus or throat pain  Respiratory: No cough, wheezing, chills or hemoptysis  Cardiovascular: No chest pain, palpitations, dizziness or leg swelling  Gastrointestinal: +  epigastric pain. No nausea, vomiting or hematemesis;   No diarrhea or constipation. No melena or hematochezia.  Skin: No itching, burning, rashes or lesions   Musculoskeletal: No joint pain or swelling; No muscle, back or extremity pain    PAST MEDICAL & SURGICAL HISTORY:  H/O compression fracture of spine      Anxiety      Metastatic breast cancer      H/O pleural effusion      Pericardial effusion      S/P tonsillectomy      H/O chest tube placement  12/23/21      S/P pericardiocentesis  12/28/21          FAMILY HISTORY:  FH: CVA (cerebrovascular accident)          MEDICATIONS:  MEDICATIONS  (STANDING):  artificial  tears Solution 1 Drop(s) Both EYES two times a day  chlorhexidine 2% Cloths 1 Application(s) Topical <User Schedule>  FLUoxetine 80 milliGRAM(s) Oral at bedtime  folic acid 1 milliGRAM(s) Oral daily  hydrocortisone hemorrhoidal Suppository 1 Suppository(s) Rectal at bedtime  HYDROmorphone PCA (1 mG/mL) 30 milliLiter(s) PCA Continuous PCA Continuous  memantine 10 milliGRAM(s) Oral two times a day  methadone    Tablet 10 milliGRAM(s) Oral <User Schedule>  methadone    Tablet 20 milliGRAM(s) Oral at bedtime  multivitamin 1 Tablet(s) Oral daily  naloxegol 25 milliGRAM(s) Oral daily  pantoprazole  Injectable 40 milliGRAM(s) IV Push every 12 hours  piperacillin/tazobactam IVPB.. 3.375 Gram(s) IV Intermittent every 8 hours  polyethylene glycol 3350 17 Gram(s) Oral at bedtime  pregabalin 150 milliGRAM(s) Oral three times a day  sodium chloride 0.9% lock flush 3 milliLiter(s) IV Push every 8 hours    MEDICATIONS  (PRN):  acetaminophen     Tablet .. 650 milliGRAM(s) Oral every 6 hours PRN Temp greater or equal to 38C (100.4F), Mild Pain (1 - 3)  aluminum hydroxide/magnesium hydroxide/simethicone Suspension 30 milliLiter(s) Oral every 4 hours PRN Dyspepsia  glycerin Suppository - Adult 1 Suppository(s) Rectal at bedtime PRN Constipation  LORazepam     Tablet 0.5 milliGRAM(s) Oral two times a day PRN Anxiety  melatonin 3 milliGRAM(s) Oral at bedtime PRN Insomnia  naloxone Injectable 0.1 milliGRAM(s) IV Push every 3 minutes PRN For ANY of the following changes in patient status:  A. RR LESS THAN 10 breaths per minute, B. Oxygen saturation LESS THAN 90%, C. Sedation score of 6  ondansetron Injectable 4 milliGRAM(s) IV Push every 8 hours PRN Nausea and/or Vomiting      Allergies    Perjeta (Other (Severe))  pertuzumab (Other (Severe))    Intolerances        Vital Signs Last 24 Hrs  T(C): 36.5 (07 Jan 2024 11:40), Max: 36.9 (06 Jan 2024 18:23)  T(F): 97.7 (07 Jan 2024 11:40), Max: 98.4 (06 Jan 2024 18:23)  HR: 80 (07 Jan 2024 11:40) (78 - 84)  BP: 95/57 (07 Jan 2024 11:40) (94/58 - 104/68)  BP(mean): --  RR: 18 (07 Jan 2024 11:40) (18 - 18)  SpO2: 94% (07 Jan 2024 11:40) (94% - 99%)    Parameters below as of 07 Jan 2024 11:40  Patient On (Oxygen Delivery Method): room air            PHYSICAL EXAM:    General:  in no acute distress  HEENT: MMM, conjunctiva and sclera clear  Gastrointestinal: Soft, non-tender non-distended; Normal bowel sounds;   No rebound or guarding  Extremities: Normal range of motion, No clubbing, cyanosis or edema  Neurological: Alert and oriented x3  Skin: Warm and dry. No obvious rash      LABS:                        8.9    3.36  )-----------( 314      ( 07 Jan 2024 07:33 )             29.4     07 Jan 2024 07:33    141    |  107    |  12.8   ----------------------------<  78     3.8     |  25.0   |  0.59     Ca    8.1        07 Jan 2024 07:33  Phos  3.5       07 Jan 2024 07:33  Mg     2.0       07 Jan 2024 07:33    TPro  5.8    /  Alb  3.2    /  TBili  1.1    /  DBili  x      /  AST  33     /  ALT  17     /  AlkPhos  176    / Amylase x      /Lipase x      06 Jan 2024 05:15              RADIOLOGY & ADDITIONAL STUDIES:     < from: CT Enterography w/ Oral Cont and w/ IV Cont (01.04.24 @ 12:29) >    ACC: [id number] EXAM:  CT ENTEROGRAPHY OC IC   ORDERED BY: SAHARA QUAN     PROCEDURE DATE:  01/04/2024          INTERPRETATION:  CLINICAL INFORMATION: Anemia. Metastatic breast cancer.    COMPARISON: CT scan abdomen pelvis 1/3/2024.    CONTRAST/COMPLICATIONS:  IV Contrast: Omnipaque 350  88 cc administered   12 cc discarded  Oral Contrast: VoLumen  Complications: None reported at time of study completion    PROCEDURE:  CT of the Abdomen and Pelvis (CT Enterography) was performed with   intravenous contrast in the late arterial phase.  Sagittal and coronal reformats were performed.    FINDINGS:    LOWER CHEST:  Small, poorly defined groundglass opacities visualized bilateral lung   bases.  Possible trace loculated left pleural fluid.    LIVER: Hepatic steatosis.  BILE DUCTS: Normal caliber.  GALLBLADDER: Layering sludge with probable noncalcified dependent   gallstone.  SPLEEN: Mild splenomegaly.  PANCREAS: Within normal limits.  ADRENALS: Within normal limits.  KIDNEYS/URETERS: Withinnormal limits.    BLADDER: Within normal limits.  REPRODUCTIVE ORGANS:  The uterus and adnexa appear within normal limits.    BOWEL:  There is mild concentric wall thickening involving the gastric   antrum/pylorus.  No discrete ulcer is noted.  Thereis mild mucosal hyperenhancement cecum and ascending colon with   minimal pericolic stranding extending along the right paracolic gutter   and posterior flank.  There is generalized colonic fecal retention with mild air distended   rectum.  No bowel obstruction.  PERITONEUM: Trace free fluid pelvis.    VESSELS: Within normal limits.  RETROPERITONEUM/LYMPH NODES: No lymphadenopathy.    ABDOMINAL WALL: Within normal limits.    BONES:  Osteoblastic and osteolytic metastatic disease, stable.  T12 andL1 vertebroplasty.  Spondylolysis L5.    IMPRESSION:    Concentric gastric antrum/pylorus thickening is suggestive of gastritis.    Mucosal hyperenhancement with mild pericolic stranding right colon may   reflect nonspecific colitis.    Other findings as discussed above.    38y old  Female who presents with a chief complaint of Symptomatic anemia (07 Jan 2024 10:45)      HPI:  37 y/o female with hx of Stage-4 Breast cancer with diffuse osseous mets, prior malignant pericardial effusion, pleural effusion s/p drain, s/p XRT/Chemo with recurrent symptomatic anemia requiring multiple transfusions. Pt was seen and evaluated at bedside. She recently tested positive for RSV, was having some SOB with walking but no cough. No overnight events, She is c/o mild epigastric pain. Hgb remains stable at 9.7, was 7.6 yesterday.      24 hour events  No reports melena, no hematochezia, appetite fair      REVIEW OF SYSTEMS:  Constitutional: No fever, weight loss + fatigue  ENMT:  No difficulty hearing, tinnitus, vertigo; No sinus or throat pain  Respiratory: No cough, wheezing, chills or hemoptysis  Cardiovascular: No chest pain, palpitations, dizziness or leg swelling  Gastrointestinal: +  epigastric pain. No nausea, vomiting or hematemesis;   No diarrhea or constipation. No melena or hematochezia.  Skin: No itching, burning, rashes or lesions   Musculoskeletal: No joint pain or swelling; No muscle, back or extremity pain    PAST MEDICAL & SURGICAL HISTORY:  H/O compression fracture of spine      Anxiety      Metastatic breast cancer      H/O pleural effusion      Pericardial effusion      S/P tonsillectomy      H/O chest tube placement  12/23/21      S/P pericardiocentesis  12/28/21          FAMILY HISTORY:  FH: CVA (cerebrovascular accident)          MEDICATIONS:  MEDICATIONS  (STANDING):  artificial  tears Solution 1 Drop(s) Both EYES two times a day  chlorhexidine 2% Cloths 1 Application(s) Topical <User Schedule>  FLUoxetine 80 milliGRAM(s) Oral at bedtime  folic acid 1 milliGRAM(s) Oral daily  hydrocortisone hemorrhoidal Suppository 1 Suppository(s) Rectal at bedtime  HYDROmorphone PCA (1 mG/mL) 30 milliLiter(s) PCA Continuous PCA Continuous  memantine 10 milliGRAM(s) Oral two times a day  methadone    Tablet 10 milliGRAM(s) Oral <User Schedule>  methadone    Tablet 20 milliGRAM(s) Oral at bedtime  multivitamin 1 Tablet(s) Oral daily  naloxegol 25 milliGRAM(s) Oral daily  pantoprazole  Injectable 40 milliGRAM(s) IV Push every 12 hours  piperacillin/tazobactam IVPB.. 3.375 Gram(s) IV Intermittent every 8 hours  polyethylene glycol 3350 17 Gram(s) Oral at bedtime  pregabalin 150 milliGRAM(s) Oral three times a day  sodium chloride 0.9% lock flush 3 milliLiter(s) IV Push every 8 hours    MEDICATIONS  (PRN):  acetaminophen     Tablet .. 650 milliGRAM(s) Oral every 6 hours PRN Temp greater or equal to 38C (100.4F), Mild Pain (1 - 3)  aluminum hydroxide/magnesium hydroxide/simethicone Suspension 30 milliLiter(s) Oral every 4 hours PRN Dyspepsia  glycerin Suppository - Adult 1 Suppository(s) Rectal at bedtime PRN Constipation  LORazepam     Tablet 0.5 milliGRAM(s) Oral two times a day PRN Anxiety  melatonin 3 milliGRAM(s) Oral at bedtime PRN Insomnia  naloxone Injectable 0.1 milliGRAM(s) IV Push every 3 minutes PRN For ANY of the following changes in patient status:  A. RR LESS THAN 10 breaths per minute, B. Oxygen saturation LESS THAN 90%, C. Sedation score of 6  ondansetron Injectable 4 milliGRAM(s) IV Push every 8 hours PRN Nausea and/or Vomiting      Allergies    Perjeta (Other (Severe))  pertuzumab (Other (Severe))    Intolerances        Vital Signs Last 24 Hrs  T(C): 36.5 (07 Jan 2024 11:40), Max: 36.9 (06 Jan 2024 18:23)  T(F): 97.7 (07 Jan 2024 11:40), Max: 98.4 (06 Jan 2024 18:23)  HR: 80 (07 Jan 2024 11:40) (78 - 84)  BP: 95/57 (07 Jan 2024 11:40) (94/58 - 104/68)  BP(mean): --  RR: 18 (07 Jan 2024 11:40) (18 - 18)  SpO2: 94% (07 Jan 2024 11:40) (94% - 99%)    Parameters below as of 07 Jan 2024 11:40  Patient On (Oxygen Delivery Method): room air            PHYSICAL EXAM:    General:  in no acute distress  HEENT: MMM, conjunctiva and sclera clear  Gastrointestinal: Soft, non-tender non-distended; Normal bowel sounds;   No rebound or guarding  Extremities: Normal range of motion, No clubbing, cyanosis or edema  Neurological: Alert and oriented x3  Skin: Warm and dry. No obvious rash      LABS:                        8.9    3.36  )-----------( 314      ( 07 Jan 2024 07:33 )             29.4     07 Jan 2024 07:33    141    |  107    |  12.8   ----------------------------<  78     3.8     |  25.0   |  0.59     Ca    8.1        07 Jan 2024 07:33  Phos  3.5       07 Jan 2024 07:33  Mg     2.0       07 Jan 2024 07:33    TPro  5.8    /  Alb  3.2    /  TBili  1.1    /  DBili  x      /  AST  33     /  ALT  17     /  AlkPhos  176    / Amylase x      /Lipase x      06 Jan 2024 05:15              RADIOLOGY & ADDITIONAL STUDIES:     < from: CT Enterography w/ Oral Cont and w/ IV Cont (01.04.24 @ 12:29) >    ACC: [id number] EXAM:  CT ENTEROGRAPHY OC IC   ORDERED BY: SAHARA QUAN     PROCEDURE DATE:  01/04/2024          INTERPRETATION:  CLINICAL INFORMATION: Anemia. Metastatic breast cancer.    COMPARISON: CT scan abdomen pelvis 1/3/2024.    CONTRAST/COMPLICATIONS:  IV Contrast: Omnipaque 350  88 cc administered   12 cc discarded  Oral Contrast: VoLumen  Complications: None reported at time of study completion    PROCEDURE:  CT of the Abdomen and Pelvis (CT Enterography) was performed with   intravenous contrast in the late arterial phase.  Sagittal and coronal reformats were performed.    FINDINGS:    LOWER CHEST:  Small, poorly defined groundglass opacities visualized bilateral lung   bases.  Possible trace loculated left pleural fluid.    LIVER: Hepatic steatosis.  BILE DUCTS: Normal caliber.  GALLBLADDER: Layering sludge with probable noncalcified dependent   gallstone.  SPLEEN: Mild splenomegaly.  PANCREAS: Within normal limits.  ADRENALS: Within normal limits.  KIDNEYS/URETERS: Withinnormal limits.    BLADDER: Within normal limits.  REPRODUCTIVE ORGANS:  The uterus and adnexa appear within normal limits.    BOWEL:  There is mild concentric wall thickening involving the gastric   antrum/pylorus.  No discrete ulcer is noted.  Thereis mild mucosal hyperenhancement cecum and ascending colon with   minimal pericolic stranding extending along the right paracolic gutter   and posterior flank.  There is generalized colonic fecal retention with mild air distended   rectum.  No bowel obstruction.  PERITONEUM: Trace free fluid pelvis.    VESSELS: Within normal limits.  RETROPERITONEUM/LYMPH NODES: No lymphadenopathy.    ABDOMINAL WALL: Within normal limits.    BONES:  Osteoblastic and osteolytic metastatic disease, stable.  T12 andL1 vertebroplasty.  Spondylolysis L5.    IMPRESSION:    Concentric gastric antrum/pylorus thickening is suggestive of gastritis.    Mucosal hyperenhancement with mild pericolic stranding right colon may   reflect nonspecific colitis.    Other findings as discussed above.

## 2024-01-07 NOTE — PROGRESS NOTE ADULT - ASSESSMENT
37 y/o female with hx of Stage lV Breast cancer with diffuse osseous mets, prior malignant pericardial effusion, pleural effusion s/p drain, s/p XRT/Chemo last chemo with Enhertu with plan to switch to Keytruda on 11/29 but has been on hold due to recurrent symptomatic anemia requiring multiple transfusions. Recently tested positive for RSV, no cough.     GI consulted for symptomatic anemia/dark stool    -Prior endoscopic evaluation:   VCE on 12/11/23 revealed enteritis  Colonoscopy on 11/20 showed internal hemorrhoids  EGD/ colonoscopy (10/2023) at [facility] showed antral erythema, colonoscopy poor prep    39 y/o female with hx of Stage lV Breast cancer with diffuse osseous mets, prior malignant pericardial effusion, pleural effusion s/p drain, s/p XRT/Chemo last chemo with Enhertu with plan to switch to Keytruda on 11/29 but has been on hold due to recurrent symptomatic anemia requiring multiple transfusions. Recently tested positive for RSV, no cough.     GI consulted for symptomatic anemia/dark stool    -Prior endoscopic evaluation:   VCE on 12/11/23 revealed enteritis  Colonoscopy on 11/20 showed internal hemorrhoids  EGD/ colonoscopy (10/2023) at [facility] showed antral erythema, colonoscopy poor prep

## 2024-01-07 NOTE — PROGRESS NOTE ADULT - SUBJECTIVE AND OBJECTIVE BOX
39 yo F known patient of Dr Chapa at [facility], metastatic breast ca on Enhertu and with prior GIB requiring blood transfusion is admitted to the hospital again due to symptomatic anemia.  Her hemoglobin was found to be 7.5.  She received 1 unit of PRBC and hemoglobin is 8.2 now.  She also has chronic cancer-related pain noted in her back.  She says that in the emergency room she received Dilaudid 2 mg IV which seem to help but says that as inpatient she received a dose of Dilaudid 1 mg which was not enough and would like to request a higher dose.  She says that last night her bowel movement was very dark and she feels it was likely mixed with blood.   She had a PET-CT and brain MRI done within the past few days which overall showed stable disease. Colitis noted on CT. She has been started on IV antibiotics.       CTE showing gastritis  afebrile  Hgb 8.9  plan for EGD        MEDICATIONS  (STANDING):  artificial  tears Solution 1 Drop(s) Both EYES two times a day  chlorhexidine 2% Cloths 1 Application(s) Topical <User Schedule>  FLUoxetine 80 milliGRAM(s) Oral at bedtime  folic acid 1 milliGRAM(s) Oral daily  hydrocortisone hemorrhoidal Suppository 1 Suppository(s) Rectal at bedtime  HYDROmorphone PCA (1 mG/mL) 30 milliLiter(s) PCA Continuous PCA Continuous  memantine 10 milliGRAM(s) Oral two times a day  methadone    Tablet 10 milliGRAM(s) Oral <User Schedule>  methadone    Tablet 20 milliGRAM(s) Oral at bedtime  multivitamin 1 Tablet(s) Oral daily  naloxegol 25 milliGRAM(s) Oral daily  pantoprazole  Injectable 40 milliGRAM(s) IV Push every 12 hours  piperacillin/tazobactam IVPB.. 3.375 Gram(s) IV Intermittent every 8 hours  polyethylene glycol 3350 17 Gram(s) Oral at bedtime  pregabalin 150 milliGRAM(s) Oral three times a day  sodium chloride 0.9% lock flush 3 milliLiter(s) IV Push every 8 hours    MEDICATIONS  (PRN):  acetaminophen     Tablet .. 650 milliGRAM(s) Oral every 6 hours PRN Temp greater or equal to 38C (100.4F), Mild Pain (1 - 3)  aluminum hydroxide/magnesium hydroxide/simethicone Suspension 30 milliLiter(s) Oral every 4 hours PRN Dyspepsia  glycerin Suppository - Adult 1 Suppository(s) Rectal at bedtime PRN Constipation  LORazepam     Tablet 0.5 milliGRAM(s) Oral two times a day PRN Anxiety  melatonin 3 milliGRAM(s) Oral at bedtime PRN Insomnia  naloxone Injectable 0.1 milliGRAM(s) IV Push every 3 minutes PRN For ANY of the following changes in patient status:  A. RR LESS THAN 10 breaths per minute, B. Oxygen saturation LESS THAN 90%, C. Sedation score of 6  ondansetron Injectable 4 milliGRAM(s) IV Push every 8 hours PRN Nausea and/or Vomiting        ICU Vital Signs Last 24 Hrs  T(C): 36.6 (06 Jan 2024 04:56), Max: 36.7 (05 Jan 2024 16:17)  T(F): 97.9 (06 Jan 2024 04:56), Max: 98.1 (05 Jan 2024 16:17)  HR: 80 (06 Jan 2024 04:56) (80 - 90)  BP: 106/64 (06 Jan 2024 04:56) (101/55 - 109/73)  BP(mean): --  ABP: --  ABP(mean): --  RR: 18 (06 Jan 2024 04:56) (18 - 18)  SpO2: 96% (06 Jan 2024 04:56) (94% - 96%)    O2 Parameters below as of 05 Jan 2024 18:15  Patient On (Oxygen Delivery Method): room air      Gen - alert and oriented  Heart - s1s2 RRR  Lung - CTA b/l  Abd - mild tender  Ext no edema               Labs:                          9.7    2.95  )-----------( 257      ( 06 Jan 2024 05:15 )             31.9                           7.6    2.80  )-----------( 249      ( 05 Jan 2024 05:51 )             25.3                           9.1    3.70  )-----------( 345      ( 04 Jan 2024 06:16 )             29.3                           8.2    3.34  )-----------( 250      ( 03 Jan 2024 07:28 )             26.8     01-06    141  |  106  |  12.1  ----------------------------<  80  3.9   |  24.0  |  0.52    Ca    8.7      06 Jan 2024 05:15  Mg     2.0     01-06    TPro  5.8<L>  /  Alb  3.2<L>  /  TBili  1.1  /  DBili  x   /  AST  33<H>  /  ALT  17  /  AlkPhos  176<H>  01-06 01-05    140  |  106  |  12.8  ----------------------------<  79  3.4<L>   |  23.0  |  0.45<L>    Ca    8.4      05 Jan 2024 05:51    TPro  5.1<L>  /  Alb  3.0<L>  /  TBili  0.7  /  DBili  x   /  AST  32<H>  /  ALT  14  /  AlkPhos  157<H>  01-05 01-03    142  |  110<H>  |  14.1  ----------------------------<  92  3.5   |  23.0  |  0.48<L>    Ca    8.2<L>      03 Jan 2024 07:28  Phos  3.7     01-03  Mg     1.9     01-03    TPro  5.6<L>  /  Alb  3.4  /  TBili  0.9  /  DBili  x   /  AST  35<H>  /  ALT  16  /  AlkPhos  192<H>  01-02   37 yo F known patient of Dr Chapa at [facility], metastatic breast ca on Enhertu and with prior GIB requiring blood transfusion is admitted to the hospital again due to symptomatic anemia.  Her hemoglobin was found to be 7.5.  She received 1 unit of PRBC and hemoglobin is 8.2 now.  She also has chronic cancer-related pain noted in her back.  She says that in the emergency room she received Dilaudid 2 mg IV which seem to help but says that as inpatient she received a dose of Dilaudid 1 mg which was not enough and would like to request a higher dose.  She says that last night her bowel movement was very dark and she feels it was likely mixed with blood.   She had a PET-CT and brain MRI done within the past few days which overall showed stable disease. Colitis noted on CT. She has been started on IV antibiotics.       CTE showing gastritis  afebrile  Hgb 8.9  plan for EGD        MEDICATIONS  (STANDING):  artificial  tears Solution 1 Drop(s) Both EYES two times a day  chlorhexidine 2% Cloths 1 Application(s) Topical <User Schedule>  FLUoxetine 80 milliGRAM(s) Oral at bedtime  folic acid 1 milliGRAM(s) Oral daily  hydrocortisone hemorrhoidal Suppository 1 Suppository(s) Rectal at bedtime  HYDROmorphone PCA (1 mG/mL) 30 milliLiter(s) PCA Continuous PCA Continuous  memantine 10 milliGRAM(s) Oral two times a day  methadone    Tablet 10 milliGRAM(s) Oral <User Schedule>  methadone    Tablet 20 milliGRAM(s) Oral at bedtime  multivitamin 1 Tablet(s) Oral daily  naloxegol 25 milliGRAM(s) Oral daily  pantoprazole  Injectable 40 milliGRAM(s) IV Push every 12 hours  piperacillin/tazobactam IVPB.. 3.375 Gram(s) IV Intermittent every 8 hours  polyethylene glycol 3350 17 Gram(s) Oral at bedtime  pregabalin 150 milliGRAM(s) Oral three times a day  sodium chloride 0.9% lock flush 3 milliLiter(s) IV Push every 8 hours    MEDICATIONS  (PRN):  acetaminophen     Tablet .. 650 milliGRAM(s) Oral every 6 hours PRN Temp greater or equal to 38C (100.4F), Mild Pain (1 - 3)  aluminum hydroxide/magnesium hydroxide/simethicone Suspension 30 milliLiter(s) Oral every 4 hours PRN Dyspepsia  glycerin Suppository - Adult 1 Suppository(s) Rectal at bedtime PRN Constipation  LORazepam     Tablet 0.5 milliGRAM(s) Oral two times a day PRN Anxiety  melatonin 3 milliGRAM(s) Oral at bedtime PRN Insomnia  naloxone Injectable 0.1 milliGRAM(s) IV Push every 3 minutes PRN For ANY of the following changes in patient status:  A. RR LESS THAN 10 breaths per minute, B. Oxygen saturation LESS THAN 90%, C. Sedation score of 6  ondansetron Injectable 4 milliGRAM(s) IV Push every 8 hours PRN Nausea and/or Vomiting        ICU Vital Signs Last 24 Hrs  T(C): 36.6 (06 Jan 2024 04:56), Max: 36.7 (05 Jan 2024 16:17)  T(F): 97.9 (06 Jan 2024 04:56), Max: 98.1 (05 Jan 2024 16:17)  HR: 80 (06 Jan 2024 04:56) (80 - 90)  BP: 106/64 (06 Jan 2024 04:56) (101/55 - 109/73)  BP(mean): --  ABP: --  ABP(mean): --  RR: 18 (06 Jan 2024 04:56) (18 - 18)  SpO2: 96% (06 Jan 2024 04:56) (94% - 96%)    O2 Parameters below as of 05 Jan 2024 18:15  Patient On (Oxygen Delivery Method): room air      Gen - alert and oriented  Heart - s1s2 RRR  Lung - CTA b/l  Abd - mild tender  Ext no edema               Labs:                          9.7    2.95  )-----------( 257      ( 06 Jan 2024 05:15 )             31.9                           7.6    2.80  )-----------( 249      ( 05 Jan 2024 05:51 )             25.3                           9.1    3.70  )-----------( 345      ( 04 Jan 2024 06:16 )             29.3                           8.2    3.34  )-----------( 250      ( 03 Jan 2024 07:28 )             26.8     01-06    141  |  106  |  12.1  ----------------------------<  80  3.9   |  24.0  |  0.52    Ca    8.7      06 Jan 2024 05:15  Mg     2.0     01-06    TPro  5.8<L>  /  Alb  3.2<L>  /  TBili  1.1  /  DBili  x   /  AST  33<H>  /  ALT  17  /  AlkPhos  176<H>  01-06 01-05    140  |  106  |  12.8  ----------------------------<  79  3.4<L>   |  23.0  |  0.45<L>    Ca    8.4      05 Jan 2024 05:51    TPro  5.1<L>  /  Alb  3.0<L>  /  TBili  0.7  /  DBili  x   /  AST  32<H>  /  ALT  14  /  AlkPhos  157<H>  01-05 01-03    142  |  110<H>  |  14.1  ----------------------------<  92  3.5   |  23.0  |  0.48<L>    Ca    8.2<L>      03 Jan 2024 07:28  Phos  3.7     01-03  Mg     1.9     01-03    TPro  5.6<L>  /  Alb  3.4  /  TBili  0.9  /  DBili  x   /  AST  35<H>  /  ALT  16  /  AlkPhos  192<H>  01-02

## 2024-01-07 NOTE — PROGRESS NOTE ADULT - NS ATTEND AMEND GEN_ALL_CORE FT
I agree with assessment and plan as above. Patient with symptomatic anemia and intermittent dark stools. Antral erythema on 10/2023 EGD, enteritis on recent VCE. Currently RSV+, but minimally symptomatic, no cough or SOB, on RA satting well. Continue PPI. Keep NPO at midnight for EGD tomorrow.

## 2024-01-07 NOTE — PROGRESS NOTE ADULT - SUBJECTIVE AND OBJECTIVE BOX
CHIEF COMPLAINT:  Patient is a 38y old  Female who presents with a chief complaint of Symptomatic anemia (06 Jan 2024 12:23)    Interval Hx:  Patient seen during rounds  Patient reports pain to be controlled on current medications  Patient denies sedation with medications      PAST MEDICAL & SURGICAL HISTORY:  H/O compression fracture of spine  Anxiety  Metastatic breast cancer  H/O pleural effusion  Pericardial effusion  S/P tonsillectomy  H/O chest tube placement 12/23/21  S/P pericardiocentesis 12/28/21    FAMILY HISTORY:  FH: CVA (cerebrovascular accident)    SOCIAL HISTORY:  [ ] Denies Smoking, Alcohol, or Drug Use    Allergies    Perjeta (Other (Severe))  pertuzumab (Other (Severe))    Intolerances      PAIN MEDICATIONS:  acetaminophen     Tablet .. 650 milliGRAM(s) Oral every 6 hours PRN  FLUoxetine 80 milliGRAM(s) Oral at bedtime  HYDROmorphone PCA (1 mG/mL) 30 milliLiter(s) PCA Continuous PCA Continuous  LORazepam     Tablet 0.5 milliGRAM(s) Oral two times a day PRN  melatonin 3 milliGRAM(s) Oral at bedtime PRN  memantine 10 milliGRAM(s) Oral two times a day  methadone    Tablet 20 milliGRAM(s) Oral at bedtime  methadone    Tablet 10 milliGRAM(s) Oral <User Schedule>  ondansetron Injectable 4 milliGRAM(s) IV Push every 8 hours PRN  pregabalin 150 milliGRAM(s) Oral three times a day    Heme:    Antibiotics:  piperacillin/tazobactam IVPB.. 3.375 Gram(s) IV Intermittent every 8 hours    Cardiovascular:    GI:  aluminum hydroxide/magnesium hydroxide/simethicone Suspension 30 milliLiter(s) Oral every 4 hours PRN  glycerin Suppository - Adult 1 Suppository(s) Rectal at bedtime PRN  naloxegol 25 milliGRAM(s) Oral daily  pantoprazole  Injectable 40 milliGRAM(s) IV Push every 12 hours  polyethylene glycol 3350 17 Gram(s) Oral at bedtime    Endocrine:    All Other Medications:  artificial  tears Solution 1 Drop(s) Both EYES two times a day  chlorhexidine 2% Cloths 1 Application(s) Topical <User Schedule>  folic acid 1 milliGRAM(s) Oral daily  hydrocortisone hemorrhoidal Suppository 1 Suppository(s) Rectal at bedtime  multivitamin 1 Tablet(s) Oral daily  naloxone Injectable 0.1 milliGRAM(s) IV Push every 3 minutes PRN  sodium chloride 0.9% lock flush 3 milliLiter(s) IV Push every 8 hours      Vital Signs Last 24 Hrs  T(C): 36.5 (07 Jan 2024 04:47), Max: 36.9 (06 Jan 2024 18:23)  T(F): 97.7 (07 Jan 2024 04:47), Max: 98.4 (06 Jan 2024 18:23)  HR: 84 (07 Jan 2024 04:47) (78 - 84)  BP: 94/58 (07 Jan 2024 04:47) (94/58 - 104/68)  BP(mean): --  RR: 18 (07 Jan 2024 04:47) (18 - 18)  SpO2: 99% (07 Jan 2024 04:47) (97% - 99%)    Parameters below as of 07 Jan 2024 04:47  Patient On (Oxygen Delivery Method): room air      PAIN SCORE:         SCALE USED: (1-10 VNRS)               LABS:                          8.9    3.36  )-----------( 314      ( 07 Jan 2024 07:33 )             29.4     01-07    141  |  107  |  12.8  ----------------------------<  78  3.8   |  25.0  |  0.59    Ca    8.1<L>      07 Jan 2024 07:33  Phos  3.5     01-07  Mg     2.0     01-07    TPro  5.8<L>  /  Alb  3.2<L>  /  TBili  1.1  /  DBili  x   /  AST  33<H>  /  ALT  17  /  AlkPhos  176<H>  01-06    PT/INR - ( 07 Jan 2024 07:33 )   PT: 11.3 sec;   INR: 1.02 ratio        Urinalysis Basic - ( 07 Jan 2024 07:33 )    Color: x / Appearance: x / SG: x / pH: x  Gluc: 78 mg/dL / Ketone: x  / Bili: x / Urobili: x   Blood: x / Protein: x / Nitrite: x   Leuk Esterase: x / RBC: x / WBC x   Sq Epi: x / Non Sq Epi: x / Bacteria: x      ASSESSMENT AND RECOMMENDATIONS  Assessment:  37 y/o F with hx of Stage-4 Breast cancer with diffuse osseous mets on Enhertu, GIB who was admitted to the hospital due to severe anemia and GIB. On home methadone for chronic pain.      Recommendations:   - Continue dPCA 0/0.3/8/6   - Continue Methadone 10mg po bid at 0600 and 1400.   - Continue Methadone 20mg po qhs.   - Continue Lyrica 150mg po tid.   - Will transition to PO opioid after GI study on Monday 1/8/24.                       CHIEF COMPLAINT:  Patient is a 38y old  Female who presents with a chief complaint of Symptomatic anemia (06 Jan 2024 12:23)    Interval Hx:  Patient seen during rounds  Patient reports pain to be controlled on current medications  Patient denies sedation with medications      PAST MEDICAL & SURGICAL HISTORY:  H/O compression fracture of spine  Anxiety  Metastatic breast cancer  H/O pleural effusion  Pericardial effusion  S/P tonsillectomy  H/O chest tube placement 12/23/21  S/P pericardiocentesis 12/28/21    FAMILY HISTORY:  FH: CVA (cerebrovascular accident)    SOCIAL HISTORY:  [ ] Denies Smoking, Alcohol, or Drug Use    Allergies    Perjeta (Other (Severe))  pertuzumab (Other (Severe))    Intolerances      PAIN MEDICATIONS:  acetaminophen     Tablet .. 650 milliGRAM(s) Oral every 6 hours PRN  FLUoxetine 80 milliGRAM(s) Oral at bedtime  HYDROmorphone PCA (1 mG/mL) 30 milliLiter(s) PCA Continuous PCA Continuous  LORazepam     Tablet 0.5 milliGRAM(s) Oral two times a day PRN  melatonin 3 milliGRAM(s) Oral at bedtime PRN  memantine 10 milliGRAM(s) Oral two times a day  methadone    Tablet 20 milliGRAM(s) Oral at bedtime  methadone    Tablet 10 milliGRAM(s) Oral <User Schedule>  ondansetron Injectable 4 milliGRAM(s) IV Push every 8 hours PRN  pregabalin 150 milliGRAM(s) Oral three times a day    Heme:    Antibiotics:  piperacillin/tazobactam IVPB.. 3.375 Gram(s) IV Intermittent every 8 hours    Cardiovascular:    GI:  aluminum hydroxide/magnesium hydroxide/simethicone Suspension 30 milliLiter(s) Oral every 4 hours PRN  glycerin Suppository - Adult 1 Suppository(s) Rectal at bedtime PRN  naloxegol 25 milliGRAM(s) Oral daily  pantoprazole  Injectable 40 milliGRAM(s) IV Push every 12 hours  polyethylene glycol 3350 17 Gram(s) Oral at bedtime    Endocrine:    All Other Medications:  artificial  tears Solution 1 Drop(s) Both EYES two times a day  chlorhexidine 2% Cloths 1 Application(s) Topical <User Schedule>  folic acid 1 milliGRAM(s) Oral daily  hydrocortisone hemorrhoidal Suppository 1 Suppository(s) Rectal at bedtime  multivitamin 1 Tablet(s) Oral daily  naloxone Injectable 0.1 milliGRAM(s) IV Push every 3 minutes PRN  sodium chloride 0.9% lock flush 3 milliLiter(s) IV Push every 8 hours      Vital Signs Last 24 Hrs  T(C): 36.5 (07 Jan 2024 04:47), Max: 36.9 (06 Jan 2024 18:23)  T(F): 97.7 (07 Jan 2024 04:47), Max: 98.4 (06 Jan 2024 18:23)  HR: 84 (07 Jan 2024 04:47) (78 - 84)  BP: 94/58 (07 Jan 2024 04:47) (94/58 - 104/68)  BP(mean): --  RR: 18 (07 Jan 2024 04:47) (18 - 18)  SpO2: 99% (07 Jan 2024 04:47) (97% - 99%)    Parameters below as of 07 Jan 2024 04:47  Patient On (Oxygen Delivery Method): room air      PAIN SCORE:   3-4    SCALE USED: (1-10 VNRS)             LABS:                          8.9    3.36  )-----------( 314      ( 07 Jan 2024 07:33 )             29.4     01-07    141  |  107  |  12.8  ----------------------------<  78  3.8   |  25.0  |  0.59    Ca    8.1<L>      07 Jan 2024 07:33  Phos  3.5     01-07  Mg     2.0     01-07    TPro  5.8<L>  /  Alb  3.2<L>  /  TBili  1.1  /  DBili  x   /  AST  33<H>  /  ALT  17  /  AlkPhos  176<H>  01-06    PT/INR - ( 07 Jan 2024 07:33 )   PT: 11.3 sec;   INR: 1.02 ratio        Urinalysis Basic - ( 07 Jan 2024 07:33 )    Color: x / Appearance: x / SG: x / pH: x  Gluc: 78 mg/dL / Ketone: x  / Bili: x / Urobili: x   Blood: x / Protein: x / Nitrite: x   Leuk Esterase: x / RBC: x / WBC x   Sq Epi: x / Non Sq Epi: x / Bacteria: x      ASSESSMENT AND RECOMMENDATIONS  Assessment:  39 y/o F with hx of Stage-4 Breast cancer with diffuse osseous mets on Enhertu, GIB who was admitted to the hospital due to severe anemia and GIB. On home Methadone 10mg po q6h and Dilaudid 4mg po tid prn pain.    Patient reports pain to be controlled on current medications. Patient denies sedation with medications.    Recommendations:   - Continue dPCA 0/0.3/8/6   - Continue Methadone 10mg po bid at 0600 and 1400.   - Continue Methadone 20mg po qhs.   - Continue Lyrica 150mg po tid.   - Will transition to PO opioid after GI study on Monday 1/8/24.      Will continue to follow. Please call pain management with any questions [phone]. CHIEF COMPLAINT:  Patient is a 38y old  Female who presents with a chief complaint of Symptomatic anemia (06 Jan 2024 12:23)    Interval Hx:  Patient seen during rounds  Patient reports pain to be controlled on current medications  Patient denies sedation with medications      PAST MEDICAL & SURGICAL HISTORY:  H/O compression fracture of spine  Anxiety  Metastatic breast cancer  H/O pleural effusion  Pericardial effusion  S/P tonsillectomy  H/O chest tube placement 12/23/21  S/P pericardiocentesis 12/28/21    FAMILY HISTORY:  FH: CVA (cerebrovascular accident)    SOCIAL HISTORY:  [ ] Denies Smoking, Alcohol, or Drug Use    Allergies    Perjeta (Other (Severe))  pertuzumab (Other (Severe))    Intolerances      PAIN MEDICATIONS:  acetaminophen     Tablet .. 650 milliGRAM(s) Oral every 6 hours PRN  FLUoxetine 80 milliGRAM(s) Oral at bedtime  HYDROmorphone PCA (1 mG/mL) 30 milliLiter(s) PCA Continuous PCA Continuous  LORazepam     Tablet 0.5 milliGRAM(s) Oral two times a day PRN  melatonin 3 milliGRAM(s) Oral at bedtime PRN  memantine 10 milliGRAM(s) Oral two times a day  methadone    Tablet 20 milliGRAM(s) Oral at bedtime  methadone    Tablet 10 milliGRAM(s) Oral <User Schedule>  ondansetron Injectable 4 milliGRAM(s) IV Push every 8 hours PRN  pregabalin 150 milliGRAM(s) Oral three times a day    Heme:    Antibiotics:  piperacillin/tazobactam IVPB.. 3.375 Gram(s) IV Intermittent every 8 hours    Cardiovascular:    GI:  aluminum hydroxide/magnesium hydroxide/simethicone Suspension 30 milliLiter(s) Oral every 4 hours PRN  glycerin Suppository - Adult 1 Suppository(s) Rectal at bedtime PRN  naloxegol 25 milliGRAM(s) Oral daily  pantoprazole  Injectable 40 milliGRAM(s) IV Push every 12 hours  polyethylene glycol 3350 17 Gram(s) Oral at bedtime    Endocrine:    All Other Medications:  artificial  tears Solution 1 Drop(s) Both EYES two times a day  chlorhexidine 2% Cloths 1 Application(s) Topical <User Schedule>  folic acid 1 milliGRAM(s) Oral daily  hydrocortisone hemorrhoidal Suppository 1 Suppository(s) Rectal at bedtime  multivitamin 1 Tablet(s) Oral daily  naloxone Injectable 0.1 milliGRAM(s) IV Push every 3 minutes PRN  sodium chloride 0.9% lock flush 3 milliLiter(s) IV Push every 8 hours      Vital Signs Last 24 Hrs  T(C): 36.5 (07 Jan 2024 04:47), Max: 36.9 (06 Jan 2024 18:23)  T(F): 97.7 (07 Jan 2024 04:47), Max: 98.4 (06 Jan 2024 18:23)  HR: 84 (07 Jan 2024 04:47) (78 - 84)  BP: 94/58 (07 Jan 2024 04:47) (94/58 - 104/68)  BP(mean): --  RR: 18 (07 Jan 2024 04:47) (18 - 18)  SpO2: 99% (07 Jan 2024 04:47) (97% - 99%)    Parameters below as of 07 Jan 2024 04:47  Patient On (Oxygen Delivery Method): room air      PAIN SCORE:   3-4    SCALE USED: (1-10 VNRS)             LABS:                          8.9    3.36  )-----------( 314      ( 07 Jan 2024 07:33 )             29.4     01-07    141  |  107  |  12.8  ----------------------------<  78  3.8   |  25.0  |  0.59    Ca    8.1<L>      07 Jan 2024 07:33  Phos  3.5     01-07  Mg     2.0     01-07    TPro  5.8<L>  /  Alb  3.2<L>  /  TBili  1.1  /  DBili  x   /  AST  33<H>  /  ALT  17  /  AlkPhos  176<H>  01-06    PT/INR - ( 07 Jan 2024 07:33 )   PT: 11.3 sec;   INR: 1.02 ratio        Urinalysis Basic - ( 07 Jan 2024 07:33 )    Color: x / Appearance: x / SG: x / pH: x  Gluc: 78 mg/dL / Ketone: x  / Bili: x / Urobili: x   Blood: x / Protein: x / Nitrite: x   Leuk Esterase: x / RBC: x / WBC x   Sq Epi: x / Non Sq Epi: x / Bacteria: x      ASSESSMENT AND RECOMMENDATIONS  Assessment:  37 y/o F with hx of Stage-4 Breast cancer with diffuse osseous mets on Enhertu, GIB who was admitted to the hospital due to severe anemia and GIB. On home Methadone 10mg po q6h and Dilaudid 4mg po tid prn pain.    Patient reports pain to be controlled on current medications. Patient denies sedation with medications.    Recommendations:   - Continue dPCA 0/0.3/8/6   - Continue Methadone 10mg po bid at 0600 and 1400.   - Continue Methadone 20mg po qhs.   - Continue Lyrica 150mg po tid.   - Will transition to PO opioid after GI study on Monday 1/8/24.      Will continue to follow. Please call pain management with any questions [phone].

## 2024-01-07 NOTE — PROGRESS NOTE ADULT - ASSESSMENT
37 yo F known patient of Dr Chapa at [facility], metastatic breast ca on Enhertu, GIB who was admitted to the hospital due to severe anemia and GIB.     1) Metastatic Breast ca  as above  on Enhertu. Last dose on 12/20/2023.  PET CT showed stable disease.  f/u with Dr Chapa upon DC    2) Anemia   s/p prbc transfusion.  GI on board  IV abx  cultures negative  s/p CTE showing gastritis  S/P 1U prbc 1/5  hgb 8.9   Plan for EGD tomorrow    3) Leukopenia  recent chemo  keep ANC > 1000  wbc stable  No fever.    4) DVT ppx      will follow 39 yo F known patient of Dr Chapa at [facility], metastatic breast ca on Enhertu, GIB who was admitted to the hospital due to severe anemia and GIB.     1) Metastatic Breast ca  as above  on Enhertu. Last dose on 12/20/2023.  PET CT showed stable disease.  f/u with Dr Chapa upon DC    2) Anemia   s/p prbc transfusion.  GI on board  IV abx  cultures negative  s/p CTE showing gastritis  S/P 1U prbc 1/5  hgb 8.9   Plan for EGD tomorrow    3) Leukopenia  recent chemo  keep ANC > 1000  wbc stable  No fever.    4) DVT ppx      will follow

## 2024-01-08 ENCOUNTER — TRANSCRIPTION ENCOUNTER (OUTPATIENT)
Age: 39
End: 2024-01-08

## 2024-01-08 LAB
ANION GAP SERPL CALC-SCNC: 11 MMOL/L — SIGNIFICANT CHANGE UP (ref 5–17)
ANION GAP SERPL CALC-SCNC: 11 MMOL/L — SIGNIFICANT CHANGE UP (ref 5–17)
BASOPHILS # BLD AUTO: 0.01 K/UL — SIGNIFICANT CHANGE UP (ref 0–0.2)
BASOPHILS # BLD AUTO: 0.01 K/UL — SIGNIFICANT CHANGE UP (ref 0–0.2)
BASOPHILS NFR BLD AUTO: 0.4 % — SIGNIFICANT CHANGE UP (ref 0–2)
BASOPHILS NFR BLD AUTO: 0.4 % — SIGNIFICANT CHANGE UP (ref 0–2)
BUN SERPL-MCNC: 13.4 MG/DL — SIGNIFICANT CHANGE UP (ref 8–20)
BUN SERPL-MCNC: 13.4 MG/DL — SIGNIFICANT CHANGE UP (ref 8–20)
CALCIUM SERPL-MCNC: 7.9 MG/DL — LOW (ref 8.4–10.5)
CALCIUM SERPL-MCNC: 7.9 MG/DL — LOW (ref 8.4–10.5)
CHLORIDE SERPL-SCNC: 106 MMOL/L — SIGNIFICANT CHANGE UP (ref 96–108)
CHLORIDE SERPL-SCNC: 106 MMOL/L — SIGNIFICANT CHANGE UP (ref 96–108)
CO2 SERPL-SCNC: 24 MMOL/L — SIGNIFICANT CHANGE UP (ref 22–29)
CO2 SERPL-SCNC: 24 MMOL/L — SIGNIFICANT CHANGE UP (ref 22–29)
CREAT SERPL-MCNC: 0.41 MG/DL — LOW (ref 0.5–1.3)
CREAT SERPL-MCNC: 0.41 MG/DL — LOW (ref 0.5–1.3)
EGFR: 130 ML/MIN/1.73M2 — SIGNIFICANT CHANGE UP
EGFR: 130 ML/MIN/1.73M2 — SIGNIFICANT CHANGE UP
EOSINOPHIL # BLD AUTO: 0.14 K/UL — SIGNIFICANT CHANGE UP (ref 0–0.5)
EOSINOPHIL # BLD AUTO: 0.14 K/UL — SIGNIFICANT CHANGE UP (ref 0–0.5)
EOSINOPHIL NFR BLD AUTO: 5 % — SIGNIFICANT CHANGE UP (ref 0–6)
EOSINOPHIL NFR BLD AUTO: 5 % — SIGNIFICANT CHANGE UP (ref 0–6)
GLUCOSE SERPL-MCNC: 77 MG/DL — SIGNIFICANT CHANGE UP (ref 70–99)
GLUCOSE SERPL-MCNC: 77 MG/DL — SIGNIFICANT CHANGE UP (ref 70–99)
HCT VFR BLD CALC: 28.7 % — LOW (ref 34.5–45)
HCT VFR BLD CALC: 28.7 % — LOW (ref 34.5–45)
HGB BLD-MCNC: 9 G/DL — LOW (ref 11.5–15.5)
HGB BLD-MCNC: 9 G/DL — LOW (ref 11.5–15.5)
IMM GRANULOCYTES NFR BLD AUTO: 0.4 % — SIGNIFICANT CHANGE UP (ref 0–0.9)
IMM GRANULOCYTES NFR BLD AUTO: 0.4 % — SIGNIFICANT CHANGE UP (ref 0–0.9)
LYMPHOCYTES # BLD AUTO: 0.94 K/UL — LOW (ref 1–3.3)
LYMPHOCYTES # BLD AUTO: 0.94 K/UL — LOW (ref 1–3.3)
LYMPHOCYTES # BLD AUTO: 33.6 % — SIGNIFICANT CHANGE UP (ref 13–44)
LYMPHOCYTES # BLD AUTO: 33.6 % — SIGNIFICANT CHANGE UP (ref 13–44)
MAGNESIUM SERPL-MCNC: 2.1 MG/DL — SIGNIFICANT CHANGE UP (ref 1.8–2.6)
MAGNESIUM SERPL-MCNC: 2.1 MG/DL — SIGNIFICANT CHANGE UP (ref 1.8–2.6)
MCHC RBC-ENTMCNC: 28.4 PG — SIGNIFICANT CHANGE UP (ref 27–34)
MCHC RBC-ENTMCNC: 28.4 PG — SIGNIFICANT CHANGE UP (ref 27–34)
MCHC RBC-ENTMCNC: 31.4 GM/DL — LOW (ref 32–36)
MCHC RBC-ENTMCNC: 31.4 GM/DL — LOW (ref 32–36)
MCV RBC AUTO: 90.5 FL — SIGNIFICANT CHANGE UP (ref 80–100)
MCV RBC AUTO: 90.5 FL — SIGNIFICANT CHANGE UP (ref 80–100)
MONOCYTES # BLD AUTO: 0.43 K/UL — SIGNIFICANT CHANGE UP (ref 0–0.9)
MONOCYTES # BLD AUTO: 0.43 K/UL — SIGNIFICANT CHANGE UP (ref 0–0.9)
MONOCYTES NFR BLD AUTO: 15.4 % — HIGH (ref 2–14)
MONOCYTES NFR BLD AUTO: 15.4 % — HIGH (ref 2–14)
NEUTROPHILS # BLD AUTO: 1.27 K/UL — LOW (ref 1.8–7.4)
NEUTROPHILS # BLD AUTO: 1.27 K/UL — LOW (ref 1.8–7.4)
NEUTROPHILS NFR BLD AUTO: 45.2 % — SIGNIFICANT CHANGE UP (ref 43–77)
NEUTROPHILS NFR BLD AUTO: 45.2 % — SIGNIFICANT CHANGE UP (ref 43–77)
PHOSPHATE SERPL-MCNC: 3.2 MG/DL — SIGNIFICANT CHANGE UP (ref 2.4–4.7)
PHOSPHATE SERPL-MCNC: 3.2 MG/DL — SIGNIFICANT CHANGE UP (ref 2.4–4.7)
PLATELET # BLD AUTO: 282 K/UL — SIGNIFICANT CHANGE UP (ref 150–400)
PLATELET # BLD AUTO: 282 K/UL — SIGNIFICANT CHANGE UP (ref 150–400)
POTASSIUM SERPL-MCNC: 3.5 MMOL/L — SIGNIFICANT CHANGE UP (ref 3.5–5.3)
POTASSIUM SERPL-MCNC: 3.5 MMOL/L — SIGNIFICANT CHANGE UP (ref 3.5–5.3)
POTASSIUM SERPL-SCNC: 3.5 MMOL/L — SIGNIFICANT CHANGE UP (ref 3.5–5.3)
POTASSIUM SERPL-SCNC: 3.5 MMOL/L — SIGNIFICANT CHANGE UP (ref 3.5–5.3)
RBC # BLD: 3.17 M/UL — LOW (ref 3.8–5.2)
RBC # BLD: 3.17 M/UL — LOW (ref 3.8–5.2)
RBC # FLD: 20.5 % — HIGH (ref 10.3–14.5)
RBC # FLD: 20.5 % — HIGH (ref 10.3–14.5)
SODIUM SERPL-SCNC: 141 MMOL/L — SIGNIFICANT CHANGE UP (ref 135–145)
SODIUM SERPL-SCNC: 141 MMOL/L — SIGNIFICANT CHANGE UP (ref 135–145)
WBC # BLD: 2.8 K/UL — LOW (ref 3.8–10.5)
WBC # BLD: 2.8 K/UL — LOW (ref 3.8–10.5)
WBC # FLD AUTO: 2.8 K/UL — LOW (ref 3.8–10.5)
WBC # FLD AUTO: 2.8 K/UL — LOW (ref 3.8–10.5)

## 2024-01-08 PROCEDURE — 99233 SBSQ HOSP IP/OBS HIGH 50: CPT

## 2024-01-08 PROCEDURE — 43235 EGD DIAGNOSTIC BRUSH WASH: CPT

## 2024-01-08 RX ORDER — HYDROMORPHONE HYDROCHLORIDE 2 MG/ML
0.5 INJECTION INTRAMUSCULAR; INTRAVENOUS; SUBCUTANEOUS EVERY 4 HOURS
Refills: 0 | Status: DISCONTINUED | OUTPATIENT
Start: 2024-01-08 | End: 2024-01-10

## 2024-01-08 RX ORDER — HYDROMORPHONE HYDROCHLORIDE 2 MG/ML
4 INJECTION INTRAMUSCULAR; INTRAVENOUS; SUBCUTANEOUS EVERY 4 HOURS
Refills: 0 | Status: DISCONTINUED | OUTPATIENT
Start: 2024-01-08 | End: 2024-01-10

## 2024-01-08 RX ORDER — METHADONE HYDROCHLORIDE 40 MG/1
15 TABLET ORAL AT BEDTIME
Refills: 0 | Status: DISCONTINUED | OUTPATIENT
Start: 2024-01-08 | End: 2024-01-09

## 2024-01-08 RX ADMIN — Medication 150 MILLIGRAM(S): at 13:16

## 2024-01-08 RX ADMIN — METHADONE HYDROCHLORIDE 10 MILLIGRAM(S): 40 TABLET ORAL at 05:47

## 2024-01-08 RX ADMIN — PIPERACILLIN AND TAZOBACTAM 25 GRAM(S): 4; .5 INJECTION, POWDER, LYOPHILIZED, FOR SOLUTION INTRAVENOUS at 22:02

## 2024-01-08 RX ADMIN — HYDROMORPHONE HYDROCHLORIDE 4 MILLIGRAM(S): 2 INJECTION INTRAMUSCULAR; INTRAVENOUS; SUBCUTANEOUS at 20:12

## 2024-01-08 RX ADMIN — HYDROMORPHONE HYDROCHLORIDE 30 MILLILITER(S): 2 INJECTION INTRAMUSCULAR; INTRAVENOUS; SUBCUTANEOUS at 17:32

## 2024-01-08 RX ADMIN — Medication 150 MILLIGRAM(S): at 22:01

## 2024-01-08 RX ADMIN — Medication 150 MILLIGRAM(S): at 05:47

## 2024-01-08 RX ADMIN — Medication 3 MILLIGRAM(S): at 22:01

## 2024-01-08 RX ADMIN — Medication 1 DROP(S): at 05:48

## 2024-01-08 RX ADMIN — Medication 1 MILLIGRAM(S): at 11:32

## 2024-01-08 RX ADMIN — MEMANTINE HYDROCHLORIDE 10 MILLIGRAM(S): 10 TABLET ORAL at 17:56

## 2024-01-08 RX ADMIN — PANTOPRAZOLE SODIUM 40 MILLIGRAM(S): 20 TABLET, DELAYED RELEASE ORAL at 17:56

## 2024-01-08 RX ADMIN — Medication 0.5 MILLIGRAM(S): at 22:01

## 2024-01-08 RX ADMIN — Medication 1 DROP(S): at 17:56

## 2024-01-08 RX ADMIN — HYDROMORPHONE HYDROCHLORIDE 4 MILLIGRAM(S): 2 INJECTION INTRAMUSCULAR; INTRAVENOUS; SUBCUTANEOUS at 21:00

## 2024-01-08 RX ADMIN — PIPERACILLIN AND TAZOBACTAM 25 GRAM(S): 4; .5 INJECTION, POWDER, LYOPHILIZED, FOR SOLUTION INTRAVENOUS at 13:17

## 2024-01-08 RX ADMIN — HYDROMORPHONE HYDROCHLORIDE 30 MILLILITER(S): 2 INJECTION INTRAMUSCULAR; INTRAVENOUS; SUBCUTANEOUS at 07:40

## 2024-01-08 RX ADMIN — PIPERACILLIN AND TAZOBACTAM 25 GRAM(S): 4; .5 INJECTION, POWDER, LYOPHILIZED, FOR SOLUTION INTRAVENOUS at 05:48

## 2024-01-08 RX ADMIN — METHADONE HYDROCHLORIDE 10 MILLIGRAM(S): 40 TABLET ORAL at 13:16

## 2024-01-08 RX ADMIN — MEMANTINE HYDROCHLORIDE 10 MILLIGRAM(S): 10 TABLET ORAL at 05:47

## 2024-01-08 RX ADMIN — HYDROMORPHONE HYDROCHLORIDE 0.5 MILLIGRAM(S): 2 INJECTION INTRAMUSCULAR; INTRAVENOUS; SUBCUTANEOUS at 23:30

## 2024-01-08 RX ADMIN — PANTOPRAZOLE SODIUM 40 MILLIGRAM(S): 20 TABLET, DELAYED RELEASE ORAL at 05:48

## 2024-01-08 RX ADMIN — SODIUM CHLORIDE 3 MILLILITER(S): 9 INJECTION INTRAMUSCULAR; INTRAVENOUS; SUBCUTANEOUS at 22:01

## 2024-01-08 RX ADMIN — Medication 80 MILLIGRAM(S): at 22:01

## 2024-01-08 RX ADMIN — HYDROMORPHONE HYDROCHLORIDE 0.5 MILLIGRAM(S): 2 INJECTION INTRAMUSCULAR; INTRAVENOUS; SUBCUTANEOUS at 23:03

## 2024-01-08 RX ADMIN — CHLORHEXIDINE GLUCONATE 1 APPLICATION(S): 213 SOLUTION TOPICAL at 05:48

## 2024-01-08 RX ADMIN — SODIUM CHLORIDE 3 MILLILITER(S): 9 INJECTION INTRAMUSCULAR; INTRAVENOUS; SUBCUTANEOUS at 05:47

## 2024-01-08 RX ADMIN — METHADONE HYDROCHLORIDE 15 MILLIGRAM(S): 40 TABLET ORAL at 22:01

## 2024-01-08 RX ADMIN — SODIUM CHLORIDE 3 MILLILITER(S): 9 INJECTION INTRAMUSCULAR; INTRAVENOUS; SUBCUTANEOUS at 15:14

## 2024-01-08 RX ADMIN — Medication 1 TABLET(S): at 11:32

## 2024-01-08 RX ADMIN — NALOXEGOL OXALATE 25 MILLIGRAM(S): 12.5 TABLET, FILM COATED ORAL at 11:32

## 2024-01-08 NOTE — PROGRESS NOTE ADULT - ASSESSMENT
Patient is a 37 y/o female with history of Stage-4 breast cancer admitted with recurrent symptomatic anemia.    1. Recurrent symptomatic anemia  -Multi-factorial from likely gastritis/colitis, prior chemo, active malignancy; rule out GIB  -Hb stable s/p 2 units of PRBC  -Iron studies reviewed  -Prior colonoscopy reviewed in 11/23  -Continue Anusol suppositories for hemorrhoids daily  -Reports enteritis on Capsular endoscopy   -CTE with gastritis and colitis  -Continue PPI  -GI PCR not collected; no diarrhea and abdominal pain improved  -Continue empiric zosyn  -EGD with nonerosive gastritis; no active bleeding  -d/c PCA pump  -GI recs appreciated    2. Leukopenia  -ANC > 1000  -Likely related to malignancy/prior chemo  -Monitor CBC  -hem/onc recs appreciated    3. Stage-4 Breast cancer with brain mets  - d/c PCA; start PO dilaudid  - Continue Methadone, Lyrica  - Continue Namenda  - pain management recs appreciated  - hem/onc recs appreciated    4. Anxiety   - continue Prozac    5. Constipation  - continue Movantik and miralax  - large, formed BM today  - denies melana or hematochezia    DVT ppx - SCDs    Dispo- s/p EGD. D/c pCA pump. Tentative plans for discharge home on 1/9.    Plan discussed with patient, Dr. Gonzalez, RN

## 2024-01-08 NOTE — PROGRESS NOTE ADULT - SUBJECTIVE AND OBJECTIVE BOX
CHIEF COMPLAINT/INTERVAL HISTORY:    Patient is a 38y old  Female who presents with a chief complaint of Symptomatic anemia (08 Jan 2024 13:15)    SUBJECTIVE & OBJECTIVE: Pt seen and examined at bedside. No overnight events. Pain controlled. EGD with nonerosive gastritis. D/C pca pump.    ROS: No chest pain, palpitations, SOB, light headedness, dizziness, headache, nausea/vomiting, fevers/chills, dysuria or increased urinary frequency.    ICU Vital Signs Last 24 Hrs  T(C): 36.7 (08 Jan 2024 18:16), Max: 36.9 (08 Jan 2024 15:00)  T(F): 98 (08 Jan 2024 18:16), Max: 98.5 (08 Jan 2024 15:00)  HR: 85 (08 Jan 2024 18:16) (77 - 88)  BP: 97/61 (08 Jan 2024 18:16) (94/59 - 111/72)  RR: 17 (08 Jan 2024 18:16) (15 - 18)  SpO2: 100% (08 Jan 2024 18:16) (97% - 100%)    O2 Parameters below as of 08 Jan 2024 18:16  Patient On (Oxygen Delivery Method): room air    MEDICATIONS  (STANDING):  artificial  tears Solution 1 Drop(s) Both EYES two times a day  chlorhexidine 2% Cloths 1 Application(s) Topical <User Schedule>  FLUoxetine 80 milliGRAM(s) Oral at bedtime  folic acid 1 milliGRAM(s) Oral daily  hydrocortisone hemorrhoidal Suppository 1 Suppository(s) Rectal at bedtime  memantine 10 milliGRAM(s) Oral two times a day  methadone    Tablet 15 milliGRAM(s) Oral at bedtime  methadone    Tablet 10 milliGRAM(s) Oral <User Schedule>  multivitamin 1 Tablet(s) Oral daily  naloxegol 25 milliGRAM(s) Oral daily  pantoprazole  Injectable 40 milliGRAM(s) IV Push every 12 hours  piperacillin/tazobactam IVPB.. 3.375 Gram(s) IV Intermittent every 8 hours  polyethylene glycol 3350 17 Gram(s) Oral at bedtime  pregabalin 150 milliGRAM(s) Oral three times a day  sodium chloride 0.9% lock flush 3 milliLiter(s) IV Push every 8 hours    MEDICATIONS  (PRN):  acetaminophen     Tablet .. 650 milliGRAM(s) Oral every 6 hours PRN Temp greater or equal to 38C (100.4F), Mild Pain (1 - 3)  aluminum hydroxide/magnesium hydroxide/simethicone Suspension 30 milliLiter(s) Oral every 4 hours PRN Dyspepsia  glycerin Suppository - Adult 1 Suppository(s) Rectal at bedtime PRN Constipation  HYDROmorphone   Tablet 4 milliGRAM(s) Oral every 4 hours PRN Severe Pain (7 - 10)  HYDROmorphone  Injectable 0.5 milliGRAM(s) IV Push every 4 hours PRN breakthough pain  LORazepam     Tablet 0.5 milliGRAM(s) Oral two times a day PRN Anxiety  melatonin 3 milliGRAM(s) Oral at bedtime PRN Insomnia  naloxone Injectable 0.1 milliGRAM(s) IV Push every 3 minutes PRN For ANY of the following changes in patient status:  A. RR LESS THAN 10 breaths per minute, B. Oxygen saturation LESS THAN 90%, C. Sedation score of 6  ondansetron Injectable 4 milliGRAM(s) IV Push every 8 hours PRN Nausea and/or Vomiting      LABS:                        9.0    2.80  )-----------( 282      ( 08 Jan 2024 05:45 )             28.7     01-08    141  |  106  |  13.4  ----------------------------<  77  3.5   |  24.0  |  0.41<L>    Ca    7.9<L>      08 Jan 2024 05:45  Phos  3.2     01-08  Mg     2.1     01-08      PT/INR - ( 07 Jan 2024 07:33 )   PT: 11.3 sec;   INR: 1.02 ratio         Urinalysis Basic - ( 08 Jan 2024 05:45 )    Color: x / Appearance: x / SG: x / pH: x  Gluc: 77 mg/dL / Ketone: x  / Bili: x / Urobili: x   Blood: x / Protein: x / Nitrite: x   Leuk Esterase: x / RBC: x / WBC x   Sq Epi: x / Non Sq Epi: x / Bacteria: x      PHYSICAL EXAM:    GENERAL: middle aged female, sitting in bed, NAD  HEAD:  Atraumatic, Normocephalic  EYES: EOMI, PERRLA, conjunctiva and sclera clear  ENMT: Moist mucous membranes  NECK: Supple  NERVOUS SYSTEM:  Alert & Oriented X3, Motor Strength 5/5 B/L upper and lower extremities   CHEST/LUNG: Clear to auscultation bilaterally   HEART: Regular rate and rhythm; +S1/S2  ABDOMEN: Soft, Nontender, Nondistended; Bowel sounds present  EXTREMITIES:  no pedal edema

## 2024-01-08 NOTE — PROGRESS NOTE ADULT - SUBJECTIVE AND OBJECTIVE BOX
Chief Complaint:  Patient is a 38y old female who presents with a chief complaint of Symptomatic anemia (08 Jan 2024 09:06).    Interval HPI/ 24 hr events: Patient seen and examined at bedside. Pt feeling well this morning. Patient reports intermittent nausea, none at this time. Reports last dark stool was yesterday. Denies vomiting, diarrhea, abdominal pain, hematemesis, hematochezia. Vitals are overall stable, Hgb 9.0. Patient found to be positive for RSV 1/5/24, denies any complaints of distress.     REVIEW OF SYSTEMS:   General: Negative  HEENT: Negative  CV: Negative  Respiratory: Negative  GI: See HPI  : Negative  MSK: Negative  Hematologic: Negative  Skin: Negative    MEDICATIONS:   MEDICATIONS  (STANDING):  artificial  tears Solution 1 Drop(s) Both EYES two times a day  chlorhexidine 2% Cloths 1 Application(s) Topical <User Schedule>  FLUoxetine 80 milliGRAM(s) Oral at bedtime  folic acid 1 milliGRAM(s) Oral daily  hydrocortisone hemorrhoidal Suppository 1 Suppository(s) Rectal at bedtime  HYDROmorphone PCA (1 mG/mL) 30 milliLiter(s) PCA Continuous PCA Continuous  memantine 10 milliGRAM(s) Oral two times a day  methadone    Tablet 20 milliGRAM(s) Oral at bedtime  methadone    Tablet 10 milliGRAM(s) Oral <User Schedule>  multivitamin 1 Tablet(s) Oral daily  naloxegol 25 milliGRAM(s) Oral daily  pantoprazole  Injectable 40 milliGRAM(s) IV Push every 12 hours  piperacillin/tazobactam IVPB.. 3.375 Gram(s) IV Intermittent every 8 hours  polyethylene glycol 3350 17 Gram(s) Oral at bedtime  pregabalin 150 milliGRAM(s) Oral three times a day  sodium chloride 0.9% lock flush 3 milliLiter(s) IV Push every 8 hours    MEDICATIONS  (PRN):  acetaminophen     Tablet .. 650 milliGRAM(s) Oral every 6 hours PRN Temp greater or equal to 38C (100.4F), Mild Pain (1 - 3)  aluminum hydroxide/magnesium hydroxide/simethicone Suspension 30 milliLiter(s) Oral every 4 hours PRN Dyspepsia  glycerin Suppository - Adult 1 Suppository(s) Rectal at bedtime PRN Constipation  LORazepam     Tablet 0.5 milliGRAM(s) Oral two times a day PRN Anxiety  melatonin 3 milliGRAM(s) Oral at bedtime PRN Insomnia  naloxone Injectable 0.1 milliGRAM(s) IV Push every 3 minutes PRN For ANY of the following changes in patient status:  A. RR LESS THAN 10 breaths per minute, B. Oxygen saturation LESS THAN 90%, C. Sedation score of 6  ondansetron Injectable 4 milliGRAM(s) IV Push every 8 hours PRN Nausea and/or Vomiting      Packed Red Cells Order:  1 Unit  Indication: Symptomatic Anemia - e.g. Chest Pain, Orthostasis, Tach  Infuse Unit : 3 Hours (01-05-24 @ 10:25)  Packed Red Cells Order:  1 Unit  Indication: Anemia due to Active Hemorrhage - Medical Conditions e.  Infuse Unit : 3 Hours (01-02-24 @ 19:43)        DIET:  Diet, NPO after Midnight:      NPO Start Date: 07-Jan-2024,   NPO Start Time: 23:59 (01-07-24 @ 09:52) [Active]  Diet, NPO after Midnight:      NPO Start Date: 07-Jan-2024,   NPO Start Time: 23:59 (01-07-24 @ 09:51) [Active]  Diet, Regular (01-04-24 @ 12:43) [Active]          ALLERGIES:   Allergies    Perjeta (Other (Severe))  pertuzumab (Other (Severe))    Intolerances        VITAL SIGNS:   Vital Signs Last 24 Hrs  T(C): 36.6 (08 Jan 2024 08:07), Max: 36.8 (07 Jan 2024 16:49)  T(F): 97.9 (08 Jan 2024 08:07), Max: 98.2 (07 Jan 2024 16:49)  HR: 77 (08 Jan 2024 08:07) (76 - 84)  BP: 94/59 (08 Jan 2024 08:07) (94/59 - 98/61)  BP(mean): --  RR: 16 (08 Jan 2024 08:07) (16 - 18)  SpO2: 98% (08 Jan 2024 08:07) (94% - 98%)    Parameters below as of 08 Jan 2024 08:07  Patient On (Oxygen Delivery Method): room air      I&O's Summary      PHYSICAL EXAM:   GENERAL:  No acute distress  HEENT:  NC/AT, conjunctiva clear, sclera anicteric  CHEST:  No increased effort  HEART:  Regular rate  ABDOMEN:  Soft, + tender to palpation, non-distended, normoactive bowel sounds, no rebound or guarding  EXTREMITIES: No edema  SKIN:  Warm, dry  NEURO:  Calm, cooperative    LABS:                        9.0    2.80  )-----------( 282      ( 08 Jan 2024 05:45 )             28.7     Hemoglobin: 9.0 g/dL (01-08-24 @ 05:45)  Hemoglobin: 8.9 g/dL (01-07-24 @ 07:33)  Hemoglobin: 9.7 g/dL (01-06-24 @ 05:15)    01-08    141  |  106  |  13.4  ----------------------------<  77  3.5   |  24.0  |  0.41<L>    Ca    7.9<L>      08 Jan 2024 05:45  Phos  3.2     01-08  Mg     2.1     01-08        PT/INR - ( 07 Jan 2024 07:33 )   PT: 11.3 sec;   INR: 1.02 ratio        RADIOLOGY & ADDITIONAL STUDIES:    < from: CT Enterography w/ Oral Cont and w/ IV Cont (01.04.24 @ 12:29) >  PROCEDURE DATE:  01/04/2024          INTERPRETATION:  CLINICAL INFORMATION: Anemia. Metastatic breast cancer.    COMPARISON: CT scan abdomen pelvis 1/3/2024.    CONTRAST/COMPLICATIONS:  IV Contrast: Omnipaque 350  88 cc administered   12 cc discarded  Oral Contrast: VoLumen  Complications: None reported at time of study completion    PROCEDURE:  CT of the Abdomen and Pelvis (CT Enterography) was performed with   intravenous contrast in the late arterial phase.  Sagittal and coronal reformats were performed.    FINDINGS:    LOWER CHEST:  Small, poorly defined groundglass opacities visualized bilateral lung   bases.  Possible trace loculated left pleural fluid.    LIVER: Hepatic steatosis.  BILE DUCTS: Normal caliber.  GALLBLADDER: Layering sludge with probable noncalcified dependent   gallstone.  SPLEEN: Mild splenomegaly.  PANCREAS: Within normal limits.  ADRENALS: Within normal limits.  KIDNEYS/URETERS: Withinnormal limits.    BLADDER: Within normal limits.  REPRODUCTIVE ORGANS:  The uterus and adnexa appear within normal limits.    BOWEL:  There is mild concentric wall thickening involving the gastric   antrum/pylorus.  No discrete ulcer is noted.  Thereis mild mucosal hyperenhancement cecum and ascending colon with   minimal pericolic stranding extending along the right paracolic gutter   and posterior flank.  There is generalized colonic fecal retention with mild air distended   rectum.  No bowel obstruction.  PERITONEUM: Trace free fluid pelvis.    VESSELS: Within normal limits.  RETROPERITONEUM/LYMPH NODES: No lymphadenopathy.    ABDOMINAL WALL: Within normal limits.    BONES:  Osteoblastic and osteolytic metastatic disease, stable.  T12 andL1 vertebroplasty.  Spondylolysis L5.    IMPRESSION:    Concentric gastric antrum/pylorus thickening is suggestive of gastritis.    Mucosal hyperenhancement with mild pericolic stranding right colon may   reflect nonspecific colitis.    Other findings as discussed above.    --- End of Report ---    YVONNE HUITRON MD; Attending Radiologist  This document has been electronically signed. Jan 4 2024  3:37PM    < end of copied text >    < from: CT Abdomen and Pelvis w/ IV Cont (01.03.24 @ 17:19) >  PROCEDURE DATE:  01/03/2024          INTERPRETATION:  CLINICAL INFORMATION: Abdominal pain and anemia.    COMPARISON: CT abdomen pelvis 12/27/2023    CONTRAST/COMPLICATIONS:  IV Contrast: Omnipaque 350  80 cc administered   20 cc discarded  Oral Contrast: NONE  Complications: None reported at time of study completion    PROCEDURE:  CT of the Abdomen and Pelvis was performed.  Sagittal and coronal reformats were performed.    FINDINGS:  LOWER CHEST: Central venous catheter in the right atrium.    LIVER: Within normal limits.  BILE DUCTS: Normal caliber.  GALLBLADDER: Within normal limits.  SPLEEN: Splenomegaly.  PANCREAS: Within normal limits.  ADRENALS: Within normal limits.  KIDNEYS/URETERS: Within normal limits.    BLADDER: Within normal limits.  REPRODUCTIVE ORGANS: Uterus and adnexa within normal limits.    BOWEL: No bowel obstruction. Wall thickening of the ascending and   rectosigmoid colon which may be due to underdistention. Moderate stool in   the remainder of the colon.  PERITONEUM: Trace pelvic ascites.  VESSELS: Within normal limits.  RETROPERITONEUM/LYMPH NODES: No lymphadenopathy. No retroperitoneal   hematoma.  ABDOMINAL WALL: Within normal limits.  BONES: Redemonstrated mixed sclerotic and lytic osseous metastatic   disease. T12 and L1 vertebral cement.    IMPRESSION:  No retroperitoneal hematoma.    Wall thickening of the ascending and rectosigmoid colon which may be due   to underdistention. Moderate stool in the remainder of the colon.    Trace ascites.      --- End of Report ---      SHAWN KOROMA MD; Attending Radiologist  This document has been electronically signed. Cristhian  3 2024  8:51PM    < end of copied text >

## 2024-01-08 NOTE — PROGRESS NOTE ADULT - ASSESSMENT
37 y/o female with hx of Stage-4 Breast cancer with diffuse osseous mets, on home methadone for chronic pain  controlled pain on dPCA    after egd, if no further studies required:  - DC PCA  - change methadone qhs dose to 15mg  - Recommend starting hydromorphone PO 4mg [id number] PRN severe pain  - Recommend starting hydromorphone IVP 0.5mg q4hour PRN breakthrough pain      37 y/o female with hx of Stage-4 Breast cancer with diffuse osseous mets, on home methadone for chronic pain  controlled pain on dPCA    after egd, if no further studies required:  - DC PCA  - change methadone qhs dose to 15mg  - Recommend starting hydromorphone PO 4mg [id number] PRN severe pain  - Recommend starting hydromorphone IVP 0.5mg q4hour PRN breakthrough pain

## 2024-01-08 NOTE — PROGRESS NOTE ADULT - ASSESSMENT
37 yo F known patient of Dr Chapa at [facility], metastatic breast ca on Enhertu, GIB who was admitted to the hospital due to severe anemia and GIB.     1) Metastatic Breast ca  as above  on Enhertu. Last dose on 12/20/2023.  PET CT showed stable disease.  f/u with Dr Chapa upon DC    2) Anemia   s/p prbc transfusion.  GI on board  IV abx  cultures negative  s/p CTE showing gastritis  S/P 1U prbc 1/5  hgb 8.9   EGD tomorrow today    3) Leukopenia  recent chemo  keep ANC > 1000  wbc stable  No fever.    4) DVT ppx      will follow 37 yo F known patient of Dr Chapa at [facility], metastatic breast ca on Enhertu, GIB who was admitted to the hospital due to severe anemia and GIB.     1) Metastatic Breast ca  as above  on Enhertu. Last dose on 12/20/2023.  PET CT showed stable disease.  f/u with Dr Chapa upon DC    2) Anemia   s/p prbc transfusion.  GI on board  IV abx  cultures negative  s/p CTE showing gastritis  S/P 1U prbc 1/5  hgb 8.9   EGD tomorrow today    3) Leukopenia  recent chemo  keep ANC > 1000  wbc stable  No fever.    4) DVT ppx      will follow 37 yo F known patient of Dr Chapa at [facility], metastatic breast ca on Enhertu, GIB who was admitted to the hospital due to severe anemia and GIB.     1) Metastatic Breast ca  as above  on Enhertu. Last dose on 12/20/2023.  PET CT showed stable disease.  f/u with Dr Chapa upon DC  no inpatient intervention    2) Anemia   s/p prbc transfusion.  GI on board  cultures negative  antibiotic use as per primary team, on Zosyn  s/p CTE showing gastritis  S/P 1U prbc 1/5  hgb 9.0  transfuse if hgb <7.0.  EGD tomorrow today    3) Leukopenia  recent chemo  keep ANC > 1000  wbc stable  No fever.    4) DVT ppx      will follow 39 yo F known patient of Dr Chapa at [facility], metastatic breast ca on Enhertu, GIB who was admitted to the hospital due to severe anemia and GIB.     1) Metastatic Breast ca  as above  on Enhertu. Last dose on 12/20/2023.  PET CT showed stable disease.  f/u with Dr Chapa upon DC  no inpatient intervention    2) Anemia   s/p prbc transfusion.  GI on board  cultures negative  antibiotic use as per primary team, on Zosyn  s/p CTE showing gastritis  S/P 1U prbc 1/5  hgb 9.0  transfuse if hgb <7.0.  EGD tomorrow today    3) Leukopenia  recent chemo  keep ANC > 1000  wbc stable  No fever.    4) DVT ppx      will follow

## 2024-01-08 NOTE — PROGRESS NOTE ADULT - SUBJECTIVE AND OBJECTIVE BOX
Interval Hx:  Patient seen during rounds  Patient reports pain to be controlled on current medications  Patient denies sedation with medications     Analgesic Dosing for past 24 hours reviewed as below:    acetaminophen   IVPB ..   400 mL/Hr IV Intermittent (01-07-24 @ 17:48)    FLUoxetine   80 milliGRAM(s) Oral (01-07-24 @ 21:29)    LORazepam     Tablet   0.5 milliGRAM(s) Oral (01-07-24 @ 21:30)    melatonin   3 milliGRAM(s) Oral (01-07-24 @ 21:29)    memantine   10 milliGRAM(s) Oral (01-08-24 @ 05:47)   10 milliGRAM(s) Oral (01-07-24 @ 17:48)    methadone    Tablet   10 milliGRAM(s) Oral (01-08-24 @ 05:47)   10 milliGRAM(s) Oral (01-07-24 @ 13:46)    methadone    Tablet   20 milliGRAM(s) Oral (01-07-24 @ 21:29)    pregabalin   150 milliGRAM(s) Oral (01-08-24 @ 05:47)   150 milliGRAM(s) Oral (01-07-24 @ 21:30)   150 milliGRAM(s) Oral (01-07-24 @ 13:47)          T(C): 36.6 (01-08-24 @ 08:07), Max: 36.8 (01-07-24 @ 16:49)  HR: 77 (01-08-24 @ 08:07) (76 - 84)  BP: 94/59 (01-08-24 @ 08:07) (94/59 - 98/61)  RR: 16 (01-08-24 @ 08:07) (16 - 18)  SpO2: 98% (01-08-24 @ 08:07) (97% - 98%)        acetaminophen     Tablet .. 650 milliGRAM(s) Oral every 6 hours PRN  aluminum hydroxide/magnesium hydroxide/simethicone Suspension 30 milliLiter(s) Oral every 4 hours PRN  artificial  tears Solution 1 Drop(s) Both EYES two times a day  chlorhexidine 2% Cloths 1 Application(s) Topical <User Schedule>  FLUoxetine 80 milliGRAM(s) Oral at bedtime  folic acid 1 milliGRAM(s) Oral daily  glycerin Suppository - Adult 1 Suppository(s) Rectal at bedtime PRN  hydrocortisone hemorrhoidal Suppository 1 Suppository(s) Rectal at bedtime  HYDROmorphone PCA (1 mG/mL) 30 milliLiter(s) PCA Continuous PCA Continuous  LORazepam     Tablet 0.5 milliGRAM(s) Oral two times a day PRN  melatonin 3 milliGRAM(s) Oral at bedtime PRN  memantine 10 milliGRAM(s) Oral two times a day  methadone    Tablet 10 milliGRAM(s) Oral <User Schedule>  methadone    Tablet 15 milliGRAM(s) Oral at bedtime  multivitamin 1 Tablet(s) Oral daily  naloxegol 25 milliGRAM(s) Oral daily  naloxone Injectable 0.1 milliGRAM(s) IV Push every 3 minutes PRN  ondansetron Injectable 4 milliGRAM(s) IV Push every 8 hours PRN  pantoprazole  Injectable 40 milliGRAM(s) IV Push every 12 hours  piperacillin/tazobactam IVPB.. 3.375 Gram(s) IV Intermittent every 8 hours  polyethylene glycol 3350 17 Gram(s) Oral at bedtime  pregabalin 150 milliGRAM(s) Oral three times a day  sodium chloride 0.9% lock flush 3 milliLiter(s) IV Push every 8 hours                          9.0    2.80  )-----------( 282      ( 08 Jan 2024 05:45 )             28.7     01-08    141  |  106  |  13.4  ----------------------------<  77  3.5   |  24.0  |  0.41<L>    Ca    7.9<L>      08 Jan 2024 05:45  Phos  3.2     01-08  Mg     2.1     01-08      PT/INR - ( 07 Jan 2024 07:33 )   PT: 11.3 sec;   INR: 1.02 ratio           Urinalysis Basic - ( 08 Jan 2024 05:45 )    Color: x / Appearance: x / SG: x / pH: x  Gluc: 77 mg/dL / Ketone: x  / Bili: x / Urobili: x   Blood: x / Protein: x / Nitrite: x   Leuk Esterase: x / RBC: x / WBC x   Sq Epi: x / Non Sq Epi: x / Bacteria: x        Pain Service   [phone] Interval Hx:  Patient seen during rounds  Patient reports pain to be controlled on current medications  Patient denies sedation with medications     Analgesic Dosing for past 24 hours reviewed as below:    acetaminophen   IVPB ..   400 mL/Hr IV Intermittent (01-07-24 @ 17:48)    FLUoxetine   80 milliGRAM(s) Oral (01-07-24 @ 21:29)    LORazepam     Tablet   0.5 milliGRAM(s) Oral (01-07-24 @ 21:30)    melatonin   3 milliGRAM(s) Oral (01-07-24 @ 21:29)    memantine   10 milliGRAM(s) Oral (01-08-24 @ 05:47)   10 milliGRAM(s) Oral (01-07-24 @ 17:48)    methadone    Tablet   10 milliGRAM(s) Oral (01-08-24 @ 05:47)   10 milliGRAM(s) Oral (01-07-24 @ 13:46)    methadone    Tablet   20 milliGRAM(s) Oral (01-07-24 @ 21:29)    pregabalin   150 milliGRAM(s) Oral (01-08-24 @ 05:47)   150 milliGRAM(s) Oral (01-07-24 @ 21:30)   150 milliGRAM(s) Oral (01-07-24 @ 13:47)          T(C): 36.6 (01-08-24 @ 08:07), Max: 36.8 (01-07-24 @ 16:49)  HR: 77 (01-08-24 @ 08:07) (76 - 84)  BP: 94/59 (01-08-24 @ 08:07) (94/59 - 98/61)  RR: 16 (01-08-24 @ 08:07) (16 - 18)  SpO2: 98% (01-08-24 @ 08:07) (97% - 98%)        acetaminophen     Tablet .. 650 milliGRAM(s) Oral every 6 hours PRN  aluminum hydroxide/magnesium hydroxide/simethicone Suspension 30 milliLiter(s) Oral every 4 hours PRN  artificial  tears Solution 1 Drop(s) Both EYES two times a day  chlorhexidine 2% Cloths 1 Application(s) Topical <User Schedule>  FLUoxetine 80 milliGRAM(s) Oral at bedtime  folic acid 1 milliGRAM(s) Oral daily  glycerin Suppository - Adult 1 Suppository(s) Rectal at bedtime PRN  hydrocortisone hemorrhoidal Suppository 1 Suppository(s) Rectal at bedtime  HYDROmorphone PCA (1 mG/mL) 30 milliLiter(s) PCA Continuous PCA Continuous  LORazepam     Tablet 0.5 milliGRAM(s) Oral two times a day PRN  melatonin 3 milliGRAM(s) Oral at bedtime PRN  memantine 10 milliGRAM(s) Oral two times a day  methadone    Tablet 10 milliGRAM(s) Oral <User Schedule>  methadone    Tablet 15 milliGRAM(s) Oral at bedtime  multivitamin 1 Tablet(s) Oral daily  naloxegol 25 milliGRAM(s) Oral daily  naloxone Injectable 0.1 milliGRAM(s) IV Push every 3 minutes PRN  ondansetron Injectable 4 milliGRAM(s) IV Push every 8 hours PRN  pantoprazole  Injectable 40 milliGRAM(s) IV Push every 12 hours  piperacillin/tazobactam IVPB.. 3.375 Gram(s) IV Intermittent every 8 hours  polyethylene glycol 3350 17 Gram(s) Oral at bedtime  pregabalin 150 milliGRAM(s) Oral three times a day  sodium chloride 0.9% lock flush 3 milliLiter(s) IV Push every 8 hours                          9.0    2.80  )-----------( 282      ( 08 Jan 2024 05:45 )             28.7     01-08    141  |  106  |  13.4  ----------------------------<  77  3.5   |  24.0  |  0.41<L>    Ca    7.9<L>      08 Jan 2024 05:45  Phos  3.2     01-08  Mg     2.1     01-08      PT/INR - ( 07 Jan 2024 07:33 )   PT: 11.3 sec;   INR: 1.02 ratio           Urinalysis Basic - ( 08 Jan 2024 05:45 )    Color: x / Appearance: x / SG: x / pH: x  Gluc: 77 mg/dL / Ketone: x  / Bili: x / Urobili: x   Blood: x / Protein: x / Nitrite: x   Leuk Esterase: x / RBC: x / WBC x   Sq Epi: x / Non Sq Epi: x / Bacteria: x        Pain Service   [phone]

## 2024-01-08 NOTE — PROGRESS NOTE ADULT - SUBJECTIVE AND OBJECTIVE BOX
37 yo F known patient of Dr Chapa at [facility], metastatic breast ca on Enhertu and with prior GIB requiring blood transfusion is admitted to the hospital again due to symptomatic anemia.  Her hemoglobin was found to be 7.5.  She received 1 unit of PRBC and hemoglobin is 8.2 now.  She also has chronic cancer-related pain noted in her back.  She says that in the emergency room she received Dilaudid 2 mg IV which seem to help but says that as inpatient she received a dose of Dilaudid 1 mg which was not enough and would like to request a higher dose.  She says that last night her bowel movement was very dark and she feels it was likely mixed with blood.   She had a PET-CT and brain MRI done within the past few days which overall showed stable disease. Colitis noted on CT. She has been started on IV antibiotics.       CTE showing gastritis  afebrile  Hgb 8.9  plan for EGD          MEDICATIONS  (STANDING):  artificial  tears Solution 1 Drop(s) Both EYES two times a day  chlorhexidine 2% Cloths 1 Application(s) Topical <User Schedule>  FLUoxetine 80 milliGRAM(s) Oral at bedtime  folic acid 1 milliGRAM(s) Oral daily  hydrocortisone hemorrhoidal Suppository 1 Suppository(s) Rectal at bedtime  HYDROmorphone PCA (1 mG/mL) 30 milliLiter(s) PCA Continuous PCA Continuous  memantine 10 milliGRAM(s) Oral two times a day  methadone    Tablet 10 milliGRAM(s) Oral <User Schedule>  methadone    Tablet 20 milliGRAM(s) Oral at bedtime  multivitamin 1 Tablet(s) Oral daily  naloxegol 25 milliGRAM(s) Oral daily  pantoprazole  Injectable 40 milliGRAM(s) IV Push every 12 hours  piperacillin/tazobactam IVPB.. 3.375 Gram(s) IV Intermittent every 8 hours  polyethylene glycol 3350 17 Gram(s) Oral at bedtime  pregabalin 150 milliGRAM(s) Oral three times a day  sodium chloride 0.9% lock flush 3 milliLiter(s) IV Push every 8 hours    MEDICATIONS  (PRN):  acetaminophen     Tablet .. 650 milliGRAM(s) Oral every 6 hours PRN Temp greater or equal to 38C (100.4F), Mild Pain (1 - 3)  aluminum hydroxide/magnesium hydroxide/simethicone Suspension 30 milliLiter(s) Oral every 4 hours PRN Dyspepsia  glycerin Suppository - Adult 1 Suppository(s) Rectal at bedtime PRN Constipation  LORazepam     Tablet 0.5 milliGRAM(s) Oral two times a day PRN Anxiety  melatonin 3 milliGRAM(s) Oral at bedtime PRN Insomnia  naloxone Injectable 0.1 milliGRAM(s) IV Push every 3 minutes PRN For ANY of the following changes in patient status:  A. RR LESS THAN 10 breaths per minute, B. Oxygen saturation LESS THAN 90%, C. Sedation score of 6  ondansetron Injectable 4 milliGRAM(s) IV Push every 8 hours PRN Nausea and/or Vomiting    Vital Signs Last 24 Hrs  T(C): 36.6 (08 Jan 2024 08:07), Max: 36.8 (07 Jan 2024 16:49)  T(F): 97.9 (08 Jan 2024 08:07), Max: 98.2 (07 Jan 2024 16:49)  HR: 77 (08 Jan 2024 08:07) (76 - 84)  BP: 94/59 (08 Jan 2024 08:07) (94/59 - 98/61)  BP(mean): --  RR: 16 (08 Jan 2024 08:07) (16 - 18)  SpO2: 98% (08 Jan 2024 08:07) (94% - 98%)    Parameters below as of 08 Jan 2024 08:07  Patient On (Oxygen Delivery Method): room air    Gen - alert and oriented  Heart - s1s2 RRR  Lung - CTA b/l  Abd - mild tender  Ext no edema               Labs:                          9.0    2.80  )-----------( 282      ( 08 Jan 2024 05:45 )             28.7                             9.7    2.95  )-----------( 257      ( 06 Jan 2024 05:15 )             31.9                           7.6    2.80  )-----------( 249      ( 05 Jan 2024 05:51 )             25.3                           9.1    3.70  )-----------( 345      ( 04 Jan 2024 06:16 )             29.3                           8.2    3.34  )-----------( 250      ( 03 Jan 2024 07:28 )             26.8     01-08    141  |  106  |  13.4  ----------------------------<  77  3.5   |  24.0  |  0.41<L>    Ca    7.9<L>      08 Jan 2024 05:45  Phos  3.2     01-08  Mg     2.1     01-08 01-06    141  |  106  |  12.1  ----------------------------<  80  3.9   |  24.0  |  0.52    Ca    8.7      06 Jan 2024 05:15  Mg     2.0     01-06    TPro  5.8<L>  /  Alb  3.2<L>  /  TBili  1.1  /  DBili  x   /  AST  33<H>  /  ALT  17  /  AlkPhos  176<H>  01-06 01-05    140  |  106  |  12.8  ----------------------------<  79  3.4<L>   |  23.0  |  0.45<L>    Ca    8.4      05 Jan 2024 05:51    TPro  5.1<L>  /  Alb  3.0<L>  /  TBili  0.7  /  DBili  x   /  AST  32<H>  /  ALT  14  /  AlkPhos  157<H>  01-05 01-03    142  |  110<H>  |  14.1  ----------------------------<  92  3.5   |  23.0  |  0.48<L>    Ca    8.2<L>      03 Jan 2024 07:28  Phos  3.7     01-03  Mg     1.9     01-03    TPro  5.6<L>  /  Alb  3.4  /  TBili  0.9  /  DBili  x   /  AST  35<H>  /  ALT  16  /  AlkPhos  192<H>  01-02   37 yo F known patient of Dr Chapa at [facility], metastatic breast ca on Enhertu and with prior GIB requiring blood transfusion is admitted to the hospital again due to symptomatic anemia.  Her hemoglobin was found to be 7.5.  She received 1 unit of PRBC and hemoglobin is 8.2 now.  She also has chronic cancer-related pain noted in her back.  She says that in the emergency room she received Dilaudid 2 mg IV which seem to help but says that as inpatient she received a dose of Dilaudid 1 mg which was not enough and would like to request a higher dose.  She says that last night her bowel movement was very dark and she feels it was likely mixed with blood.   She had a PET-CT and brain MRI done within the past few days which overall showed stable disease. Colitis noted on CT. She has been started on IV antibiotics.       CTE showing gastritis  afebrile  Hgb 8.9  plan for EGD          MEDICATIONS  (STANDING):  artificial  tears Solution 1 Drop(s) Both EYES two times a day  chlorhexidine 2% Cloths 1 Application(s) Topical <User Schedule>  FLUoxetine 80 milliGRAM(s) Oral at bedtime  folic acid 1 milliGRAM(s) Oral daily  hydrocortisone hemorrhoidal Suppository 1 Suppository(s) Rectal at bedtime  HYDROmorphone PCA (1 mG/mL) 30 milliLiter(s) PCA Continuous PCA Continuous  memantine 10 milliGRAM(s) Oral two times a day  methadone    Tablet 10 milliGRAM(s) Oral <User Schedule>  methadone    Tablet 20 milliGRAM(s) Oral at bedtime  multivitamin 1 Tablet(s) Oral daily  naloxegol 25 milliGRAM(s) Oral daily  pantoprazole  Injectable 40 milliGRAM(s) IV Push every 12 hours  piperacillin/tazobactam IVPB.. 3.375 Gram(s) IV Intermittent every 8 hours  polyethylene glycol 3350 17 Gram(s) Oral at bedtime  pregabalin 150 milliGRAM(s) Oral three times a day  sodium chloride 0.9% lock flush 3 milliLiter(s) IV Push every 8 hours    MEDICATIONS  (PRN):  acetaminophen     Tablet .. 650 milliGRAM(s) Oral every 6 hours PRN Temp greater or equal to 38C (100.4F), Mild Pain (1 - 3)  aluminum hydroxide/magnesium hydroxide/simethicone Suspension 30 milliLiter(s) Oral every 4 hours PRN Dyspepsia  glycerin Suppository - Adult 1 Suppository(s) Rectal at bedtime PRN Constipation  LORazepam     Tablet 0.5 milliGRAM(s) Oral two times a day PRN Anxiety  melatonin 3 milliGRAM(s) Oral at bedtime PRN Insomnia  naloxone Injectable 0.1 milliGRAM(s) IV Push every 3 minutes PRN For ANY of the following changes in patient status:  A. RR LESS THAN 10 breaths per minute, B. Oxygen saturation LESS THAN 90%, C. Sedation score of 6  ondansetron Injectable 4 milliGRAM(s) IV Push every 8 hours PRN Nausea and/or Vomiting    Vital Signs Last 24 Hrs  T(C): 36.6 (08 Jan 2024 08:07), Max: 36.8 (07 Jan 2024 16:49)  T(F): 97.9 (08 Jan 2024 08:07), Max: 98.2 (07 Jan 2024 16:49)  HR: 77 (08 Jan 2024 08:07) (76 - 84)  BP: 94/59 (08 Jan 2024 08:07) (94/59 - 98/61)  BP(mean): --  RR: 16 (08 Jan 2024 08:07) (16 - 18)  SpO2: 98% (08 Jan 2024 08:07) (94% - 98%)    Parameters below as of 08 Jan 2024 08:07  Patient On (Oxygen Delivery Method): room air    Gen - alert and oriented  Heart - s1s2 RRR  Lung - CTA b/l  Abd - mild tender  Ext no edema               Labs:                          9.0    2.80  )-----------( 282      ( 08 Jan 2024 05:45 )             28.7                             9.7    2.95  )-----------( 257      ( 06 Jan 2024 05:15 )             31.9                           7.6    2.80  )-----------( 249      ( 05 Jan 2024 05:51 )             25.3                           9.1    3.70  )-----------( 345      ( 04 Jan 2024 06:16 )             29.3                           8.2    3.34  )-----------( 250      ( 03 Jan 2024 07:28 )             26.8     01-08    141  |  106  |  13.4  ----------------------------<  77  3.5   |  24.0  |  0.41<L>    Ca    7.9<L>      08 Jan 2024 05:45  Phos  3.2     01-08  Mg     2.1     01-08 01-06    141  |  106  |  12.1  ----------------------------<  80  3.9   |  24.0  |  0.52    Ca    8.7      06 Jan 2024 05:15  Mg     2.0     01-06    TPro  5.8<L>  /  Alb  3.2<L>  /  TBili  1.1  /  DBili  x   /  AST  33<H>  /  ALT  17  /  AlkPhos  176<H>  01-06 01-05    140  |  106  |  12.8  ----------------------------<  79  3.4<L>   |  23.0  |  0.45<L>    Ca    8.4      05 Jan 2024 05:51    TPro  5.1<L>  /  Alb  3.0<L>  /  TBili  0.7  /  DBili  x   /  AST  32<H>  /  ALT  14  /  AlkPhos  157<H>  01-05 01-03    142  |  110<H>  |  14.1  ----------------------------<  92  3.5   |  23.0  |  0.48<L>    Ca    8.2<L>      03 Jan 2024 07:28  Phos  3.7     01-03  Mg     1.9     01-03    TPro  5.6<L>  /  Alb  3.4  /  TBili  0.9  /  DBili  x   /  AST  35<H>  /  ALT  16  /  AlkPhos  192<H>  01-02   39 yo F known patient of Dr Chapa at [facility], metastatic breast ca on Enhertu and with prior GIB requiring blood transfusion is admitted to the hospital again due to symptomatic anemia.  Her hemoglobin was found to be 7.5.  She received 1 unit of PRBC and hemoglobin is 8.2 now.  She also has chronic cancer-related pain noted in her back.  She says that in the emergency room she received Dilaudid 2 mg IV which seem to help but says that as inpatient she received a dose of Dilaudid 1 mg which was not enough and would like to request a higher dose.  She says that last night her bowel movement was very dark and she feels it was likely mixed with blood.   She had a PET-CT and brain MRI done within the past few days which overall showed stable disease. Colitis noted on CT. She has been started on IV antibiotics.       CTE showing gastritis  afebrile  Hgb 9.0  plan for EGD today.  Pain is moderately well controlled on PCA pump  Noted dark stools but no bright red blood.  No vomiting.           MEDICATIONS  (STANDING):  artificial  tears Solution 1 Drop(s) Both EYES two times a day  chlorhexidine 2% Cloths 1 Application(s) Topical <User Schedule>  FLUoxetine 80 milliGRAM(s) Oral at bedtime  folic acid 1 milliGRAM(s) Oral daily  hydrocortisone hemorrhoidal Suppository 1 Suppository(s) Rectal at bedtime  HYDROmorphone PCA (1 mG/mL) 30 milliLiter(s) PCA Continuous PCA Continuous  memantine 10 milliGRAM(s) Oral two times a day  methadone    Tablet 10 milliGRAM(s) Oral <User Schedule>  methadone    Tablet 20 milliGRAM(s) Oral at bedtime  multivitamin 1 Tablet(s) Oral daily  naloxegol 25 milliGRAM(s) Oral daily  pantoprazole  Injectable 40 milliGRAM(s) IV Push every 12 hours  piperacillin/tazobactam IVPB.. 3.375 Gram(s) IV Intermittent every 8 hours  polyethylene glycol 3350 17 Gram(s) Oral at bedtime  pregabalin 150 milliGRAM(s) Oral three times a day  sodium chloride 0.9% lock flush 3 milliLiter(s) IV Push every 8 hours    MEDICATIONS  (PRN):  acetaminophen     Tablet .. 650 milliGRAM(s) Oral every 6 hours PRN Temp greater or equal to 38C (100.4F), Mild Pain (1 - 3)  aluminum hydroxide/magnesium hydroxide/simethicone Suspension 30 milliLiter(s) Oral every 4 hours PRN Dyspepsia  glycerin Suppository - Adult 1 Suppository(s) Rectal at bedtime PRN Constipation  LORazepam     Tablet 0.5 milliGRAM(s) Oral two times a day PRN Anxiety  melatonin 3 milliGRAM(s) Oral at bedtime PRN Insomnia  naloxone Injectable 0.1 milliGRAM(s) IV Push every 3 minutes PRN For ANY of the following changes in patient status:  A. RR LESS THAN 10 breaths per minute, B. Oxygen saturation LESS THAN 90%, C. Sedation score of 6  ondansetron Injectable 4 milliGRAM(s) IV Push every 8 hours PRN Nausea and/or Vomiting    Vital Signs Last 24 Hrs  T(C): 36.6 (08 Jan 2024 08:07), Max: 36.8 (07 Jan 2024 16:49)  T(F): 97.9 (08 Jan 2024 08:07), Max: 98.2 (07 Jan 2024 16:49)  HR: 77 (08 Jan 2024 08:07) (76 - 84)  BP: 94/59 (08 Jan 2024 08:07) (94/59 - 98/61)  BP(mean): --  RR: 16 (08 Jan 2024 08:07) (16 - 18)  SpO2: 98% (08 Jan 2024 08:07) (94% - 98%)    Parameters below as of 08 Jan 2024 08:07  Patient On (Oxygen Delivery Method): room air    Gen - alert and oriented  Heart - s1s2 RRR  Lung - CTA b/l  Abd - mild tender  Ext no edema               Labs:                          9.0    2.80  )-----------( 282      ( 08 Jan 2024 05:45 )             28.7                             9.7    2.95  )-----------( 257      ( 06 Jan 2024 05:15 )             31.9                           7.6    2.80  )-----------( 249      ( 05 Jan 2024 05:51 )             25.3                           9.1    3.70  )-----------( 345      ( 04 Jan 2024 06:16 )             29.3                           8.2    3.34  )-----------( 250      ( 03 Jan 2024 07:28 )             26.8     01-08    141  |  106  |  13.4  ----------------------------<  77  3.5   |  24.0  |  0.41<L>    Ca    7.9<L>      08 Jan 2024 05:45  Phos  3.2     01-08  Mg     2.1     01-08 01-06    141  |  106  |  12.1  ----------------------------<  80  3.9   |  24.0  |  0.52    Ca    8.7      06 Jan 2024 05:15  Mg     2.0     01-06    TPro  5.8<L>  /  Alb  3.2<L>  /  TBili  1.1  /  DBili  x   /  AST  33<H>  /  ALT  17  /  AlkPhos  176<H>  01-06 01-05    140  |  106  |  12.8  ----------------------------<  79  3.4<L>   |  23.0  |  0.45<L>    Ca    8.4      05 Jan 2024 05:51    TPro  5.1<L>  /  Alb  3.0<L>  /  TBili  0.7  /  DBili  x   /  AST  32<H>  /  ALT  14  /  AlkPhos  157<H>  01-05 01-03    142  |  110<H>  |  14.1  ----------------------------<  92  3.5   |  23.0  |  0.48<L>    Ca    8.2<L>      03 Jan 2024 07:28  Phos  3.7     01-03  Mg     1.9     01-03    TPro  5.6<L>  /  Alb  3.4  /  TBili  0.9  /  DBili  x   /  AST  35<H>  /  ALT  16  /  AlkPhos  192<H>  01-02   37 yo F known patient of Dr Chapa at [facility], metastatic breast ca on Enhertu and with prior GIB requiring blood transfusion is admitted to the hospital again due to symptomatic anemia.  Her hemoglobin was found to be 7.5.  She received 1 unit of PRBC and hemoglobin is 8.2 now.  She also has chronic cancer-related pain noted in her back.  She says that in the emergency room she received Dilaudid 2 mg IV which seem to help but says that as inpatient she received a dose of Dilaudid 1 mg which was not enough and would like to request a higher dose.  She says that last night her bowel movement was very dark and she feels it was likely mixed with blood.   She had a PET-CT and brain MRI done within the past few days which overall showed stable disease. Colitis noted on CT. She has been started on IV antibiotics.       CTE showing gastritis  afebrile  Hgb 9.0  plan for EGD today.  Pain is moderately well controlled on PCA pump  Noted dark stools but no bright red blood.  No vomiting.           MEDICATIONS  (STANDING):  artificial  tears Solution 1 Drop(s) Both EYES two times a day  chlorhexidine 2% Cloths 1 Application(s) Topical <User Schedule>  FLUoxetine 80 milliGRAM(s) Oral at bedtime  folic acid 1 milliGRAM(s) Oral daily  hydrocortisone hemorrhoidal Suppository 1 Suppository(s) Rectal at bedtime  HYDROmorphone PCA (1 mG/mL) 30 milliLiter(s) PCA Continuous PCA Continuous  memantine 10 milliGRAM(s) Oral two times a day  methadone    Tablet 10 milliGRAM(s) Oral <User Schedule>  methadone    Tablet 20 milliGRAM(s) Oral at bedtime  multivitamin 1 Tablet(s) Oral daily  naloxegol 25 milliGRAM(s) Oral daily  pantoprazole  Injectable 40 milliGRAM(s) IV Push every 12 hours  piperacillin/tazobactam IVPB.. 3.375 Gram(s) IV Intermittent every 8 hours  polyethylene glycol 3350 17 Gram(s) Oral at bedtime  pregabalin 150 milliGRAM(s) Oral three times a day  sodium chloride 0.9% lock flush 3 milliLiter(s) IV Push every 8 hours    MEDICATIONS  (PRN):  acetaminophen     Tablet .. 650 milliGRAM(s) Oral every 6 hours PRN Temp greater or equal to 38C (100.4F), Mild Pain (1 - 3)  aluminum hydroxide/magnesium hydroxide/simethicone Suspension 30 milliLiter(s) Oral every 4 hours PRN Dyspepsia  glycerin Suppository - Adult 1 Suppository(s) Rectal at bedtime PRN Constipation  LORazepam     Tablet 0.5 milliGRAM(s) Oral two times a day PRN Anxiety  melatonin 3 milliGRAM(s) Oral at bedtime PRN Insomnia  naloxone Injectable 0.1 milliGRAM(s) IV Push every 3 minutes PRN For ANY of the following changes in patient status:  A. RR LESS THAN 10 breaths per minute, B. Oxygen saturation LESS THAN 90%, C. Sedation score of 6  ondansetron Injectable 4 milliGRAM(s) IV Push every 8 hours PRN Nausea and/or Vomiting    Vital Signs Last 24 Hrs  T(C): 36.6 (08 Jan 2024 08:07), Max: 36.8 (07 Jan 2024 16:49)  T(F): 97.9 (08 Jan 2024 08:07), Max: 98.2 (07 Jan 2024 16:49)  HR: 77 (08 Jan 2024 08:07) (76 - 84)  BP: 94/59 (08 Jan 2024 08:07) (94/59 - 98/61)  BP(mean): --  RR: 16 (08 Jan 2024 08:07) (16 - 18)  SpO2: 98% (08 Jan 2024 08:07) (94% - 98%)    Parameters below as of 08 Jan 2024 08:07  Patient On (Oxygen Delivery Method): room air    Gen - alert and oriented  Heart - s1s2 RRR  Lung - CTA b/l  Abd - mild tender  Ext no edema               Labs:                          9.0    2.80  )-----------( 282      ( 08 Jan 2024 05:45 )             28.7                             9.7    2.95  )-----------( 257      ( 06 Jan 2024 05:15 )             31.9                           7.6    2.80  )-----------( 249      ( 05 Jan 2024 05:51 )             25.3                           9.1    3.70  )-----------( 345      ( 04 Jan 2024 06:16 )             29.3                           8.2    3.34  )-----------( 250      ( 03 Jan 2024 07:28 )             26.8     01-08    141  |  106  |  13.4  ----------------------------<  77  3.5   |  24.0  |  0.41<L>    Ca    7.9<L>      08 Jan 2024 05:45  Phos  3.2     01-08  Mg     2.1     01-08 01-06    141  |  106  |  12.1  ----------------------------<  80  3.9   |  24.0  |  0.52    Ca    8.7      06 Jan 2024 05:15  Mg     2.0     01-06    TPro  5.8<L>  /  Alb  3.2<L>  /  TBili  1.1  /  DBili  x   /  AST  33<H>  /  ALT  17  /  AlkPhos  176<H>  01-06 01-05    140  |  106  |  12.8  ----------------------------<  79  3.4<L>   |  23.0  |  0.45<L>    Ca    8.4      05 Jan 2024 05:51    TPro  5.1<L>  /  Alb  3.0<L>  /  TBili  0.7  /  DBili  x   /  AST  32<H>  /  ALT  14  /  AlkPhos  157<H>  01-05 01-03    142  |  110<H>  |  14.1  ----------------------------<  92  3.5   |  23.0  |  0.48<L>    Ca    8.2<L>      03 Jan 2024 07:28  Phos  3.7     01-03  Mg     1.9     01-03    TPro  5.6<L>  /  Alb  3.4  /  TBili  0.9  /  DBili  x   /  AST  35<H>  /  ALT  16  /  AlkPhos  192<H>  01-02

## 2024-01-08 NOTE — PROGRESS NOTE ADULT - ASSESSMENT
37 y/o female with hx of Stage lV Breast cancer with diffuse osseous mets, prior malignant pericardial effusion, pleural effusion s/p drain, s/p XRT/Chemo last chemo with Enhertu with plan to switch to Keytruda on 11/29 but has been on hold due to recurrent symptomatic anemia requiring multiple transfusions. Recently tested positive for RSV, no cough.     GI consulted for symptomatic anemia/dark stool    -Prior endoscopic evaluation:   VCE on 12/11/23 revealed enteritis  Colonoscopy on 11/20 showed internal hemorrhoids  EGD/ colonoscopy (10/2023) at [place] showed antral erythema, colonoscopy poor prep     #GIB  #melena  - 1/4/2024: CT Enterography: There is mild concentric wall thickening involving the gastric   antrum/pylorus.Thereis mild mucosal hyperenhancement cecum and ascending colon with   minimal pericolic stranding extending along the right paracolic gutter   and posterior flank. There is generalized colonic fecal retention with mild air distended   rectum. No bowel obstruction.  - Hgb stable today 9.0  - Trend CBC, monitor for signs of bleeding, transfuse as needed. Avoid NSAIDs.   - RSV +:  37 y/o female with hx of Stage lV Breast cancer with diffuse osseous mets, prior malignant pericardial effusion, pleural effusion s/p drain, s/p XRT/Chemo last chemo with Enhertu with plan to switch to Keytruda on 11/29 but has been on hold due to recurrent symptomatic anemia requiring multiple transfusions. Recently tested positive for RSV, no cough.     GI consulted for symptomatic anemia/dark stool    -Prior endoscopic evaluation:   VCE on 12/11/23 revealed enteritis  Colonoscopy on 11/20 showed internal hemorrhoids  EGD/ colonoscopy (10/2023) at [place] showed antral erythema, colonoscopy poor prep     #GIB  #melena  - 1/4/2024: CT Enterography: There is mild concentric wall thickening involving the gastric   antrum/pylorus.Thereis mild mucosal hyperenhancement cecum and ascending colon with   minimal pericolic stranding extending along the right paracolic gutter   and posterior flank. There is generalized colonic fecal retention with mild air distended   rectum. No bowel obstruction.  - Hgb stable today 9.0  - Trend CBC, monitor for signs of bleeding, transfuse as needed. Avoid NSAIDs.   - RSV +:  39 y/o female with hx of Stage lV Breast cancer with diffuse osseous mets, prior malignant pericardial effusion, pleural effusion s/p drain, s/p XRT/Chemo last chemo with Enhertu with plan to switch to Keytruda on 11/29 but has been on hold due to recurrent symptomatic anemia requiring multiple transfusions. Recently tested positive for RSV, no cough.     GI consulted for symptomatic anemia/dark stool    -Prior endoscopic evaluation:   VCE on 12/11/23 revealed enteritis  Colonoscopy on 11/20 showed internal hemorrhoids  EGD/ colonoscopy (10/2023) at [place] showed antral erythema, colonoscopy poor prep     #GIB  #melena  - 1/4/2024: CT Enterography: There is mild concentric wall thickening involving the gastric   antrum/pylorus.Thereis mild mucosal hyperenhancement cecum and ascending colon with   minimal pericolic stranding extending along the right paracolic gutter   and posterior flank. There is generalized colonic fecal retention with mild air distended   rectum. No bowel obstruction.  - Hgb stable today 9.0  - Trend CBC, monitor for signs of bleeding, transfuse as needed. Avoid NSAIDs.   - RSV +: remains afebrile, no signs/symptoms of acute respiratory distress  - EGD planned for today    _________________________________________________________________  Assessment and recommendations are final when note is signed by the attending physician.  39 y/o female with hx of Stage lV Breast cancer with diffuse osseous mets, prior malignant pericardial effusion, pleural effusion s/p drain, s/p XRT/Chemo last chemo with Enhertu with plan to switch to Keytruda on 11/29 but has been on hold due to recurrent symptomatic anemia requiring multiple transfusions. Recently tested positive for RSV, no cough.     GI consulted for symptomatic anemia/dark stool    -Prior endoscopic evaluation:   VCE on 12/11/23 revealed enteritis  Colonoscopy on 11/20 showed internal hemorrhoids  EGD/ colonoscopy (10/2023) at [place] showed antral erythema, colonoscopy poor prep     #GIB  #melena  - 1/4/2024: CT Enterography: There is mild concentric wall thickening involving the gastric   antrum/pylorus.Thereis mild mucosal hyperenhancement cecum and ascending colon with   minimal pericolic stranding extending along the right paracolic gutter   and posterior flank. There is generalized colonic fecal retention with mild air distended   rectum. No bowel obstruction.  - Hgb stable today 9.0  - Trend CBC, monitor for signs of bleeding, transfuse as needed. Avoid NSAIDs.   - RSV +: remains afebrile, no signs/symptoms of acute respiratory distress  - EGD planned for today    _________________________________________________________________  Assessment and recommendations are final when note is signed by the attending physician.

## 2024-01-09 LAB
ANION GAP SERPL CALC-SCNC: 9 MMOL/L — SIGNIFICANT CHANGE UP (ref 5–17)
ANION GAP SERPL CALC-SCNC: 9 MMOL/L — SIGNIFICANT CHANGE UP (ref 5–17)
BLD GP AB SCN SERPL QL: SIGNIFICANT CHANGE UP
BLD GP AB SCN SERPL QL: SIGNIFICANT CHANGE UP
BUN SERPL-MCNC: 11.2 MG/DL — SIGNIFICANT CHANGE UP (ref 8–20)
BUN SERPL-MCNC: 11.2 MG/DL — SIGNIFICANT CHANGE UP (ref 8–20)
CALCIUM SERPL-MCNC: 7.8 MG/DL — LOW (ref 8.4–10.5)
CALCIUM SERPL-MCNC: 7.8 MG/DL — LOW (ref 8.4–10.5)
CHLORIDE SERPL-SCNC: 109 MMOL/L — HIGH (ref 96–108)
CHLORIDE SERPL-SCNC: 109 MMOL/L — HIGH (ref 96–108)
CO2 SERPL-SCNC: 24 MMOL/L — SIGNIFICANT CHANGE UP (ref 22–29)
CO2 SERPL-SCNC: 24 MMOL/L — SIGNIFICANT CHANGE UP (ref 22–29)
CREAT SERPL-MCNC: 0.39 MG/DL — LOW (ref 0.5–1.3)
CREAT SERPL-MCNC: 0.39 MG/DL — LOW (ref 0.5–1.3)
EGFR: 131 ML/MIN/1.73M2 — SIGNIFICANT CHANGE UP
EGFR: 131 ML/MIN/1.73M2 — SIGNIFICANT CHANGE UP
GLUCOSE SERPL-MCNC: 84 MG/DL — SIGNIFICANT CHANGE UP (ref 70–99)
GLUCOSE SERPL-MCNC: 84 MG/DL — SIGNIFICANT CHANGE UP (ref 70–99)
HCT VFR BLD CALC: 25.3 % — LOW (ref 34.5–45)
HCT VFR BLD CALC: 25.3 % — LOW (ref 34.5–45)
HCT VFR BLD CALC: 28.5 % — LOW (ref 34.5–45)
HCT VFR BLD CALC: 28.5 % — LOW (ref 34.5–45)
HGB BLD-MCNC: 7.9 G/DL — LOW (ref 11.5–15.5)
HGB BLD-MCNC: 7.9 G/DL — LOW (ref 11.5–15.5)
HGB BLD-MCNC: 8.4 G/DL — LOW (ref 11.5–15.5)
HGB BLD-MCNC: 8.4 G/DL — LOW (ref 11.5–15.5)
MAGNESIUM SERPL-MCNC: 2.3 MG/DL — SIGNIFICANT CHANGE UP (ref 1.8–2.6)
MAGNESIUM SERPL-MCNC: 2.3 MG/DL — SIGNIFICANT CHANGE UP (ref 1.8–2.6)
MCHC RBC-ENTMCNC: 28.5 PG — SIGNIFICANT CHANGE UP (ref 27–34)
MCHC RBC-ENTMCNC: 28.5 PG — SIGNIFICANT CHANGE UP (ref 27–34)
MCHC RBC-ENTMCNC: 31.2 GM/DL — LOW (ref 32–36)
MCHC RBC-ENTMCNC: 31.2 GM/DL — LOW (ref 32–36)
MCV RBC AUTO: 91.3 FL — SIGNIFICANT CHANGE UP (ref 80–100)
MCV RBC AUTO: 91.3 FL — SIGNIFICANT CHANGE UP (ref 80–100)
PHOSPHATE SERPL-MCNC: 2.8 MG/DL — SIGNIFICANT CHANGE UP (ref 2.4–4.7)
PHOSPHATE SERPL-MCNC: 2.8 MG/DL — SIGNIFICANT CHANGE UP (ref 2.4–4.7)
PLATELET # BLD AUTO: 237 K/UL — SIGNIFICANT CHANGE UP (ref 150–400)
PLATELET # BLD AUTO: 237 K/UL — SIGNIFICANT CHANGE UP (ref 150–400)
POTASSIUM SERPL-MCNC: 3.9 MMOL/L — SIGNIFICANT CHANGE UP (ref 3.5–5.3)
POTASSIUM SERPL-MCNC: 3.9 MMOL/L — SIGNIFICANT CHANGE UP (ref 3.5–5.3)
POTASSIUM SERPL-SCNC: 3.9 MMOL/L — SIGNIFICANT CHANGE UP (ref 3.5–5.3)
POTASSIUM SERPL-SCNC: 3.9 MMOL/L — SIGNIFICANT CHANGE UP (ref 3.5–5.3)
RBC # BLD: 2.77 M/UL — LOW (ref 3.8–5.2)
RBC # BLD: 2.77 M/UL — LOW (ref 3.8–5.2)
RBC # FLD: 20 % — HIGH (ref 10.3–14.5)
RBC # FLD: 20 % — HIGH (ref 10.3–14.5)
SODIUM SERPL-SCNC: 142 MMOL/L — SIGNIFICANT CHANGE UP (ref 135–145)
SODIUM SERPL-SCNC: 142 MMOL/L — SIGNIFICANT CHANGE UP (ref 135–145)
WBC # BLD: 2.98 K/UL — LOW (ref 3.8–10.5)
WBC # BLD: 2.98 K/UL — LOW (ref 3.8–10.5)
WBC # FLD AUTO: 2.98 K/UL — LOW (ref 3.8–10.5)
WBC # FLD AUTO: 2.98 K/UL — LOW (ref 3.8–10.5)

## 2024-01-09 PROCEDURE — 99232 SBSQ HOSP IP/OBS MODERATE 35: CPT

## 2024-01-09 RX ORDER — METHADONE HYDROCHLORIDE 40 MG/1
10 TABLET ORAL
Refills: 0 | Status: DISCONTINUED | OUTPATIENT
Start: 2024-01-10 | End: 2024-01-10

## 2024-01-09 RX ORDER — METHADONE HYDROCHLORIDE 40 MG/1
20 TABLET ORAL AT BEDTIME
Refills: 0 | Status: DISCONTINUED | OUTPATIENT
Start: 2024-01-09 | End: 2024-01-10

## 2024-01-09 RX ADMIN — HYDROMORPHONE HYDROCHLORIDE 4 MILLIGRAM(S): 2 INJECTION INTRAMUSCULAR; INTRAVENOUS; SUBCUTANEOUS at 22:00

## 2024-01-09 RX ADMIN — HYDROMORPHONE HYDROCHLORIDE 4 MILLIGRAM(S): 2 INJECTION INTRAMUSCULAR; INTRAVENOUS; SUBCUTANEOUS at 21:18

## 2024-01-09 RX ADMIN — Medication 150 MILLIGRAM(S): at 13:37

## 2024-01-09 RX ADMIN — PIPERACILLIN AND TAZOBACTAM 25 GRAM(S): 4; .5 INJECTION, POWDER, LYOPHILIZED, FOR SOLUTION INTRAVENOUS at 21:22

## 2024-01-09 RX ADMIN — HYDROMORPHONE HYDROCHLORIDE 4 MILLIGRAM(S): 2 INJECTION INTRAMUSCULAR; INTRAVENOUS; SUBCUTANEOUS at 01:57

## 2024-01-09 RX ADMIN — NALOXEGOL OXALATE 25 MILLIGRAM(S): 12.5 TABLET, FILM COATED ORAL at 12:15

## 2024-01-09 RX ADMIN — SODIUM CHLORIDE 3 MILLILITER(S): 9 INJECTION INTRAMUSCULAR; INTRAVENOUS; SUBCUTANEOUS at 06:00

## 2024-01-09 RX ADMIN — Medication 1 TABLET(S): at 12:15

## 2024-01-09 RX ADMIN — CHLORHEXIDINE GLUCONATE 1 APPLICATION(S): 213 SOLUTION TOPICAL at 06:04

## 2024-01-09 RX ADMIN — HYDROMORPHONE HYDROCHLORIDE 4 MILLIGRAM(S): 2 INJECTION INTRAMUSCULAR; INTRAVENOUS; SUBCUTANEOUS at 12:15

## 2024-01-09 RX ADMIN — HYDROMORPHONE HYDROCHLORIDE 0.5 MILLIGRAM(S): 2 INJECTION INTRAMUSCULAR; INTRAVENOUS; SUBCUTANEOUS at 22:55

## 2024-01-09 RX ADMIN — SODIUM CHLORIDE 3 MILLILITER(S): 9 INJECTION INTRAMUSCULAR; INTRAVENOUS; SUBCUTANEOUS at 14:00

## 2024-01-09 RX ADMIN — Medication 150 MILLIGRAM(S): at 06:03

## 2024-01-09 RX ADMIN — METHADONE HYDROCHLORIDE 10 MILLIGRAM(S): 40 TABLET ORAL at 06:03

## 2024-01-09 RX ADMIN — SODIUM CHLORIDE 3 MILLILITER(S): 9 INJECTION INTRAMUSCULAR; INTRAVENOUS; SUBCUTANEOUS at 22:00

## 2024-01-09 RX ADMIN — PIPERACILLIN AND TAZOBACTAM 25 GRAM(S): 4; .5 INJECTION, POWDER, LYOPHILIZED, FOR SOLUTION INTRAVENOUS at 13:37

## 2024-01-09 RX ADMIN — HYDROMORPHONE HYDROCHLORIDE 4 MILLIGRAM(S): 2 INJECTION INTRAMUSCULAR; INTRAVENOUS; SUBCUTANEOUS at 16:01

## 2024-01-09 RX ADMIN — Medication 1 DROP(S): at 17:02

## 2024-01-09 RX ADMIN — PIPERACILLIN AND TAZOBACTAM 25 GRAM(S): 4; .5 INJECTION, POWDER, LYOPHILIZED, FOR SOLUTION INTRAVENOUS at 06:04

## 2024-01-09 RX ADMIN — HYDROMORPHONE HYDROCHLORIDE 4 MILLIGRAM(S): 2 INJECTION INTRAMUSCULAR; INTRAVENOUS; SUBCUTANEOUS at 13:15

## 2024-01-09 RX ADMIN — HYDROMORPHONE HYDROCHLORIDE 4 MILLIGRAM(S): 2 INJECTION INTRAMUSCULAR; INTRAVENOUS; SUBCUTANEOUS at 02:45

## 2024-01-09 RX ADMIN — Medication 0.5 MILLIGRAM(S): at 21:18

## 2024-01-09 RX ADMIN — HYDROMORPHONE HYDROCHLORIDE 0.5 MILLIGRAM(S): 2 INJECTION INTRAMUSCULAR; INTRAVENOUS; SUBCUTANEOUS at 08:23

## 2024-01-09 RX ADMIN — HYDROMORPHONE HYDROCHLORIDE 0.5 MILLIGRAM(S): 2 INJECTION INTRAMUSCULAR; INTRAVENOUS; SUBCUTANEOUS at 09:25

## 2024-01-09 RX ADMIN — HYDROMORPHONE HYDROCHLORIDE 4 MILLIGRAM(S): 2 INJECTION INTRAMUSCULAR; INTRAVENOUS; SUBCUTANEOUS at 06:07

## 2024-01-09 RX ADMIN — HYDROMORPHONE HYDROCHLORIDE 4 MILLIGRAM(S): 2 INJECTION INTRAMUSCULAR; INTRAVENOUS; SUBCUTANEOUS at 17:01

## 2024-01-09 RX ADMIN — HYDROMORPHONE HYDROCHLORIDE 0.5 MILLIGRAM(S): 2 INJECTION INTRAMUSCULAR; INTRAVENOUS; SUBCUTANEOUS at 18:51

## 2024-01-09 RX ADMIN — METHADONE HYDROCHLORIDE 20 MILLIGRAM(S): 40 TABLET ORAL at 21:19

## 2024-01-09 RX ADMIN — Medication 3 MILLIGRAM(S): at 21:18

## 2024-01-09 RX ADMIN — HYDROMORPHONE HYDROCHLORIDE 0.5 MILLIGRAM(S): 2 INJECTION INTRAMUSCULAR; INTRAVENOUS; SUBCUTANEOUS at 23:15

## 2024-01-09 RX ADMIN — MEMANTINE HYDROCHLORIDE 10 MILLIGRAM(S): 10 TABLET ORAL at 06:03

## 2024-01-09 RX ADMIN — METHADONE HYDROCHLORIDE 10 MILLIGRAM(S): 40 TABLET ORAL at 13:37

## 2024-01-09 RX ADMIN — PANTOPRAZOLE SODIUM 40 MILLIGRAM(S): 20 TABLET, DELAYED RELEASE ORAL at 06:04

## 2024-01-09 RX ADMIN — PANTOPRAZOLE SODIUM 40 MILLIGRAM(S): 20 TABLET, DELAYED RELEASE ORAL at 17:02

## 2024-01-09 RX ADMIN — Medication 80 MILLIGRAM(S): at 21:19

## 2024-01-09 RX ADMIN — Medication 150 MILLIGRAM(S): at 21:18

## 2024-01-09 RX ADMIN — MEMANTINE HYDROCHLORIDE 10 MILLIGRAM(S): 10 TABLET ORAL at 17:01

## 2024-01-09 RX ADMIN — Medication 1 MILLIGRAM(S): at 12:15

## 2024-01-09 NOTE — PROGRESS NOTE ADULT - ASSESSMENT
Patient is a 39 y/o female with history of Stage-4 breast cancer admitted with recurrent symptomatic anemia.    1. Recurrent symptomatic anemia  -Multi-factorial from likely gastritis/colitis, prior chemo, active malignancy; GIB ruled out  -Hb downtrended s/p 2 units of PRBC but repeat stable  -Iron studies reviewed  -Prior colonoscopy reviewed in 11/23  -Continue Anusol suppositories for hemorrhoids daily  -Reports enteritis on Capsular endoscopy   -CTE with gastritis and colitis  -Continue PPI  -GI PCR not collected; no diarrhea and abdominal pain improved  -Continue empiric zosyn, switch to PO abx to complete course of abx  -EGD with nonerosive gastritis; no active bleeding  -GI recs appreciated; last colonoscopy 2 months ago. No further GI work up required.  -Hem/onc follow up requested ? outpatient BM biopsy    2. Leukopenia  -ANC > 1000  -Likely related to malignancy/prior chemo  -Monitor CBC  -hem/onc recs appreciated    3. Stage-4 Breast cancer with brain mets  - Continue Methadone at increased dose and Lyrica  - Continue Namenda  - pain management recs appreciated  - hem/onc recs appreciated    4. Anxiety   - continue Prozac    5. Constipation  - continue Movantik and miralax  - reports large BM today; all stools are dark    DVT ppx - SCDs    Dispo- Hold discharge due to drop in Hb and monitor overnight. Plans for discharge home on 1/10 if Hb stable. Hem/onc follow up.     Plan discussed with patient, Dr. Gonzalez, Dr. Baker, Dr. Rosado, RN   Patient is a 37 y/o female with history of Stage-4 breast cancer admitted with recurrent symptomatic anemia.    1. Recurrent symptomatic anemia  -Multi-factorial from likely gastritis/colitis, prior chemo, active malignancy; GIB ruled out  -Hb downtrended s/p 2 units of PRBC but repeat stable  -Iron studies reviewed  -Prior colonoscopy reviewed in 11/23  -Continue Anusol suppositories for hemorrhoids daily  -Reports enteritis on Capsular endoscopy   -CTE with gastritis and colitis  -Continue PPI  -GI PCR not collected; no diarrhea and abdominal pain improved  -Continue empiric zosyn, switch to PO abx to complete course of abx  -EGD with nonerosive gastritis; no active bleeding  -GI recs appreciated; last colonoscopy 2 months ago. No further GI work up required.  -Hem/onc follow up requested ? outpatient BM biopsy    2. Leukopenia  -ANC > 1000  -Likely related to malignancy/prior chemo  -Monitor CBC  -hem/onc recs appreciated    3. Stage-4 Breast cancer with brain mets  - Continue Methadone at increased dose and Lyrica  - Continue Namenda  - pain management recs appreciated  - hem/onc recs appreciated    4. Anxiety   - continue Prozac    5. Constipation  - continue Movantik and miralax  - reports large BM today; all stools are dark    DVT ppx - SCDs    Dispo- Hold discharge due to drop in Hb and monitor overnight. Plans for discharge home on 1/10 if Hb stable. Hem/onc follow up.     Plan discussed with patient, Dr. Gonzalez, Dr. Baker, Dr. Rosado, RN

## 2024-01-09 NOTE — PROGRESS NOTE ADULT - SUBJECTIVE AND OBJECTIVE BOX
Chief Complaint:  Patient is a 38y old  Female who presents with a chief complaint of Symptomatic anemia (2024 12:41)      Interval HPI/ 24 hr events: Patient seen and examined at bedside. Patient feeling well this morning. Tolerating diet. Reports last dark bowel movement yesterday. Also reports having some abdominal cramping yesterday, but denies any today. S/p EGD yesterday. Denies nausea, vomiting, diarrhea, abdominal pain, hematemesis, hematochezia. Vitals are overall stable, latest Hgb 8.4.      REVIEW OF SYSTEMS:   General: Negative  HEENT: Negative  CV: Negative  Respiratory: Negative  GI: See HPI  : Negative  MSK: Negative  Hematologic: Negative  Skin: Negative    MEDICATIONS:   MEDICATIONS  (STANDING):  artificial  tears Solution 1 Drop(s) Both EYES two times a day  chlorhexidine 2% Cloths 1 Application(s) Topical <User Schedule>  FLUoxetine 80 milliGRAM(s) Oral at bedtime  folic acid 1 milliGRAM(s) Oral daily  hydrocortisone hemorrhoidal Suppository 1 Suppository(s) Rectal at bedtime  memantine 10 milliGRAM(s) Oral two times a day  methadone    Tablet 20 milliGRAM(s) Oral at bedtime  methadone    Tablet 10 milliGRAM(s) Oral <User Schedule>  multivitamin 1 Tablet(s) Oral daily  naloxegol 25 milliGRAM(s) Oral daily  pantoprazole  Injectable 40 milliGRAM(s) IV Push every 12 hours  piperacillin/tazobactam IVPB.. 3.375 Gram(s) IV Intermittent every 8 hours  polyethylene glycol 3350 17 Gram(s) Oral at bedtime  pregabalin 150 milliGRAM(s) Oral three times a day  sodium chloride 0.9% lock flush 3 milliLiter(s) IV Push every 8 hours    MEDICATIONS  (PRN):  acetaminophen     Tablet .. 650 milliGRAM(s) Oral every 6 hours PRN Temp greater or equal to 38C (100.4F), Mild Pain (1 - 3)  aluminum hydroxide/magnesium hydroxide/simethicone Suspension 30 milliLiter(s) Oral every 4 hours PRN Dyspepsia  glycerin Suppository - Adult 1 Suppository(s) Rectal at bedtime PRN Constipation  HYDROmorphone   Tablet 4 milliGRAM(s) Oral every 4 hours PRN Severe Pain (7 - 10)  HYDROmorphone  Injectable 0.5 milliGRAM(s) IV Push every 4 hours PRN breakthough pain  LORazepam     Tablet 0.5 milliGRAM(s) Oral two times a day PRN Anxiety  melatonin 3 milliGRAM(s) Oral at bedtime PRN Insomnia  naloxone Injectable 0.1 milliGRAM(s) IV Push every 3 minutes PRN For ANY of the following changes in patient status:  A. RR LESS THAN 10 breaths per minute, B. Oxygen saturation LESS THAN 90%, C. Sedation score of 6  ondansetron Injectable 4 milliGRAM(s) IV Push every 8 hours PRN Nausea and/or Vomiting      Packed Red Cells Order:  1 Unit  Indication: Symptomatic Anemia - e.g. Chest Pain, Orthostasis, Tach  Infuse Unit : 3 Hours (24 @ 10:25)  Packed Red Cells Order:  1 Unit  Indication: Anemia due to Active Hemorrhage - Medical Conditions e.  Infuse Unit : 3 Hours (24 @ 19:43)        DIET:  Diet, Regular (24 @ 12:43) [Active]      ALLERGIES:   Allergies    Perjeta (Other (Severe))  pertuzumab (Other (Severe))    Intolerances        VITAL SIGNS:   Vital Signs Last 24 Hrs  T(C): 36.6 (2024 11:06), Max: 36.9 (2024 15:00)  T(F): 97.9 (2024 11:06), Max: 98.5 (2024 15:00)  HR: 89 (2024 11:06) (85 - 89)  BP: 93/60 (2024 11:06) (93/60 - 111/72)  BP(mean): --  RR: 19 (2024 11:06) (17 - 19)  SpO2: 95% (2024 11:06) (95% - 100%)    Parameters below as of 2024 11:06  Patient On (Oxygen Delivery Method): room air      I&O's Summary      PHYSICAL EXAM:   GENERAL:  No acute distress  HEENT:  NC/AT, conjunctiva clear, sclera anicteric  CHEST:  No increased effort  HEART:  Regular rate  ABDOMEN:  Soft, non-tender, non-distended, normoactive bowel sounds, no rebound or guarding  EXTREMITIES: No edema  SKIN:  Warm, dry  NEURO:  Calm, cooperative    LABS:                        8.4    x     )-----------( x        ( 2024 08:48 )             28.5     Hemoglobin: 8.4 g/dL (24 @ 08:48)  Hemoglobin: 7.9 g/dL (24 @ 05:27)  Hemoglobin: 9.0 g/dL (24 @ 05:45)  Hemoglobin: 8.9 g/dL (24 @ 07:33)        142  |  109<H>  |  11.2  ----------------------------<  84  3.9   |  24.0  |  0.39<L>    Ca    7.8<L>      2024 08:48  Phos  2.8       Mg     2.3           RADIOLOGY & ADDITIONAL STUDIES:          EGD Report        Date: 2024 1:38 PM        Patient Name: NATIVIDAD MYERS        MRN: [id number]        Account Number:        [id number]        Gender: Female         (age): 1985 (38)        Instrument(s):        [facility] [id number]        Attending/Fellow:        MD Jorge Ford [facility]        Procedure Room #:        PROCEDURE ROOM 1          ASA Class:        P3 - 2024 2:18 PM Hermes Rosado MD        History of Present Illness:        H&P was previously reviewed.        Administered Medications:        As per Anesthesiology Record        Indications:        Rectal bleedin.3 - K62.5        Procedure:        The procedure, indications, preparation and potential complications were    explained to the patient, who indicated understanding and signed the    corresponding consent forms. MAC was administered by anesthesiologist.    Continuous pulse oximetry and blood pressure monitoring were used throughout the    procedure. Supplemental oxygen was used. Patient was placed in the left lateral    decubitus position. The endoscope was introduced through the mouth and advanced    under direct visualization until the second part of the duodenum was reached.    Patient tolerance to the procedure was good. The procedure was not difficult.    Blood loss was minimal.        Limitations/Complications:        There were no apparent limitations or complications        Findings:        Esophagus Normal esophagus.        Stomach Mucosa Localized erythema and congestion of the mucosa was noted in the    pre-pyloric region. These findings are compatible with non-erosive gastritis.    Food debris found in the stomach fundus and antrum.      Duodenum Mucosa Normal mucosa was noted in the whole examined duodenum extending    to ligament of treitz.        Impressions:        Normal esophagus.        Erythema and congestion in the pre-pyloric region compatible with non-erosive    gastritis.        Normal mucosa in the whole examined duodenum extending to ligament of treitz.            Plan:        Return to floor for further management        Continue current medical regimen.        Resume Previous Diet        Hermes Rosado MD        Version 1, Electronically signed on :05:12 PM by MD Jorge Ford   Chief Complaint:  Patient is a 38y old  Female who presents with a chief complaint of Symptomatic anemia (2024 12:41)      Interval HPI/ 24 hr events: Patient seen and examined at bedside. Patient feeling well this morning. Tolerating diet. Reports last dark bowel movement yesterday. Also reports having some abdominal cramping yesterday, but denies any today. S/p EGD yesterday. Denies nausea, vomiting, diarrhea, abdominal pain, hematemesis, hematochezia. Vitals are overall stable, latest Hgb 8.4.      REVIEW OF SYSTEMS:   General: Negative  HEENT: Negative  CV: Negative  Respiratory: Negative  GI: See HPI  : Negative  MSK: Negative  Hematologic: Negative  Skin: Negative    MEDICATIONS:   MEDICATIONS  (STANDING):  artificial  tears Solution 1 Drop(s) Both EYES two times a day  chlorhexidine 2% Cloths 1 Application(s) Topical <User Schedule>  FLUoxetine 80 milliGRAM(s) Oral at bedtime  folic acid 1 milliGRAM(s) Oral daily  hydrocortisone hemorrhoidal Suppository 1 Suppository(s) Rectal at bedtime  memantine 10 milliGRAM(s) Oral two times a day  methadone    Tablet 20 milliGRAM(s) Oral at bedtime  methadone    Tablet 10 milliGRAM(s) Oral <User Schedule>  multivitamin 1 Tablet(s) Oral daily  naloxegol 25 milliGRAM(s) Oral daily  pantoprazole  Injectable 40 milliGRAM(s) IV Push every 12 hours  piperacillin/tazobactam IVPB.. 3.375 Gram(s) IV Intermittent every 8 hours  polyethylene glycol 3350 17 Gram(s) Oral at bedtime  pregabalin 150 milliGRAM(s) Oral three times a day  sodium chloride 0.9% lock flush 3 milliLiter(s) IV Push every 8 hours    MEDICATIONS  (PRN):  acetaminophen     Tablet .. 650 milliGRAM(s) Oral every 6 hours PRN Temp greater or equal to 38C (100.4F), Mild Pain (1 - 3)  aluminum hydroxide/magnesium hydroxide/simethicone Suspension 30 milliLiter(s) Oral every 4 hours PRN Dyspepsia  glycerin Suppository - Adult 1 Suppository(s) Rectal at bedtime PRN Constipation  HYDROmorphone   Tablet 4 milliGRAM(s) Oral every 4 hours PRN Severe Pain (7 - 10)  HYDROmorphone  Injectable 0.5 milliGRAM(s) IV Push every 4 hours PRN breakthough pain  LORazepam     Tablet 0.5 milliGRAM(s) Oral two times a day PRN Anxiety  melatonin 3 milliGRAM(s) Oral at bedtime PRN Insomnia  naloxone Injectable 0.1 milliGRAM(s) IV Push every 3 minutes PRN For ANY of the following changes in patient status:  A. RR LESS THAN 10 breaths per minute, B. Oxygen saturation LESS THAN 90%, C. Sedation score of 6  ondansetron Injectable 4 milliGRAM(s) IV Push every 8 hours PRN Nausea and/or Vomiting      Packed Red Cells Order:  1 Unit  Indication: Symptomatic Anemia - e.g. Chest Pain, Orthostasis, Tach  Infuse Unit : 3 Hours (24 @ 10:25)  Packed Red Cells Order:  1 Unit  Indication: Anemia due to Active Hemorrhage - Medical Conditions e.  Infuse Unit : 3 Hours (24 @ 19:43)        DIET:  Diet, Regular (24 @ 12:43) [Active]      ALLERGIES:   Allergies    Perjeta (Other (Severe))  pertuzumab (Other (Severe))    Intolerances        VITAL SIGNS:   Vital Signs Last 24 Hrs  T(C): 36.6 (2024 11:06), Max: 36.9 (2024 15:00)  T(F): 97.9 (2024 11:06), Max: 98.5 (2024 15:00)  HR: 89 (2024 11:06) (85 - 89)  BP: 93/60 (2024 11:06) (93/60 - 111/72)  BP(mean): --  RR: 19 (2024 11:06) (17 - 19)  SpO2: 95% (2024 11:06) (95% - 100%)    Parameters below as of 2024 11:06  Patient On (Oxygen Delivery Method): room air      I&O's Summary      PHYSICAL EXAM:   GENERAL:  No acute distress  HEENT:  NC/AT, conjunctiva clear, sclera anicteric  CHEST:  No increased effort  HEART:  Regular rate  ABDOMEN:  Soft, non-tender, non-distended, normoactive bowel sounds, no rebound or guarding  EXTREMITIES: No edema  SKIN:  Warm, dry  NEURO:  Calm, cooperative    LABS:                        8.4    x     )-----------( x        ( 2024 08:48 )             28.5     Hemoglobin: 8.4 g/dL (24 @ 08:48)  Hemoglobin: 7.9 g/dL (24 @ 05:27)  Hemoglobin: 9.0 g/dL (24 @ 05:45)  Hemoglobin: 8.9 g/dL (24 @ 07:33)        142  |  109<H>  |  11.2  ----------------------------<  84  3.9   |  24.0  |  0.39<L>    Ca    7.8<L>      2024 08:48  Phos  2.8       Mg     2.3           RADIOLOGY & ADDITIONAL STUDIES:          EGD Report        Date: 2024 1:38 PM        Patient Name: NATIVIDAD MYERS        MRN: [id number]        Account Number:        [id number]        Gender: Female         (age): 1985 (38)        Instrument(s):        [facility] [id number]        Attending/Fellow:        MD Jorge Ford [facility]        Procedure Room #:        PROCEDURE ROOM 1          ASA Class:        P3 - 2024 2:18 PM Hermes Rosado MD        History of Present Illness:        H&P was previously reviewed.        Administered Medications:        As per Anesthesiology Record        Indications:        Rectal bleedin.3 - K62.5        Procedure:        The procedure, indications, preparation and potential complications were    explained to the patient, who indicated understanding and signed the    corresponding consent forms. MAC was administered by anesthesiologist.    Continuous pulse oximetry and blood pressure monitoring were used throughout the    procedure. Supplemental oxygen was used. Patient was placed in the left lateral    decubitus position. The endoscope was introduced through the mouth and advanced    under direct visualization until the second part of the duodenum was reached.    Patient tolerance to the procedure was good. The procedure was not difficult.    Blood loss was minimal.        Limitations/Complications:        There were no apparent limitations or complications        Findings:        Esophagus Normal esophagus.        Stomach Mucosa Localized erythema and congestion of the mucosa was noted in the    pre-pyloric region. These findings are compatible with non-erosive gastritis.    Food debris found in the stomach fundus and antrum.      Duodenum Mucosa Normal mucosa was noted in the whole examined duodenum extending    to ligament of treitz.        Impressions:        Normal esophagus.        Erythema and congestion in the pre-pyloric region compatible with non-erosive    gastritis.        Normal mucosa in the whole examined duodenum extending to ligament of treitz.            Plan:        Return to floor for further management        Continue current medical regimen.        Resume Previous Diet        Hermes Rosado MD        Version 1, Electronically signed on :05:12 PM by MD Jorge Ford

## 2024-01-09 NOTE — PROGRESS NOTE ADULT - ASSESSMENT
39 y/o female with hx of Stage-4 Breast cancer with diffuse osseous mets, on home methadone for chronic pain    off pca today  states to have had overnight discomfort  pt requesting to go back to previously increased methadone bedtime dose of 20mg

## 2024-01-09 NOTE — PROGRESS NOTE ADULT - ASSESSMENT
This is a 39 y/o female with hx of Stage lV Breast cancer with diffuse osseous mets, prior malignant pericardial effusion, pleural effusion s/p drain, s/p XRT/Chemo last chemo with Enhertu with plan to switch to Keytruda on 11/29 but has been on hold due to recurrent symptomatic anemia requiring multiple transfusions. Recently tested positive for RSV, no cough.     GI consulted for symptomatic anemia/dark stool    -Prior endoscopic evaluation:   VCE on 12/11/23 revealed enteritis  Colonoscopy on 11/20 showed internal hemorrhoids  EGD/ colonoscopy (10/2023) at [place] showed antral erythema, colonoscopy poor prep     #symptomatic anemia  #melena  - 1/4/2024 CT A/P (CT Enterography): mild concentric wall thickening involving gastric antrum/pylorus, mild mucosal enhancement cecum and ascending colon with minimal pericolic stranding extending along the right paracolic gutter and posterior plank, generalized colonic fecal retention with mild air distended rectum, no bowel obstruction  - 1/8/2024 EGD: stomach mucosa with localized erythema and congestion was noted in the pre-pyloric region compatible with non-erosive gastritis  - Hgb stable today, 8.4 g  - Trend CBC, and transfuse as needed, avoid NSAIDs  - Diet as tolerated  - Continue Pantoprazole  - further anemia workup per primary team  - GI will sign off now. Please call us back with any questions or concern.   _________________________________________________________________  Assessment and recommendations are final when note is signed by the attending physician.  This is a 39 y/o female with hx of Stage lV Breast cancer with diffuse osseous mets, prior malignant pericardial effusion, pleural effusion s/p drain, s/p XRT/Chemo last chemo with Enhertu with plan to switch to Keytruda on 11/29 but has been on hold due to recurrent symptomatic anemia requiring multiple transfusions. Recently tested positive for RSV, no cough.     GI consulted for symptomatic anemia/dark stool    -Prior endoscopic evaluation:   VCE on 12/11/23 revealed enteritis  Colonoscopy on 11/20 showed internal hemorrhoids  EGD/ colonoscopy (10/2023) at [place] showed antral erythema, colonoscopy poor prep     #symptomatic anemia  #melena  - 1/4/2024 CT A/P (CT Enterography): mild concentric wall thickening involving gastric antrum/pylorus, mild mucosal enhancement cecum and ascending colon with minimal pericolic stranding extending along the right paracolic gutter and posterior plank, generalized colonic fecal retention with mild air distended rectum, no bowel obstruction  - 1/8/2024 EGD: stomach mucosa with localized erythema and congestion was noted in the pre-pyloric region compatible with non-erosive gastritis  - Hgb stable today, 8.4 g  - Trend CBC, and transfuse as needed, avoid NSAIDs  - Diet as tolerated  - Continue Pantoprazole  - further anemia workup per primary team  - GI will sign off now. Please call us back with any questions or concern.   _________________________________________________________________  Assessment and recommendations are final when note is signed by the attending physician.

## 2024-01-09 NOTE — PROGRESS NOTE ADULT - SUBJECTIVE AND OBJECTIVE BOX
CHIEF COMPLAINT/INTERVAL HISTORY:    Patient is a 38y old  Female who presents with a chief complaint of Symptomatic anemia (09 Jan 2024 12:54)    SUBJECTIVE & OBJECTIVE: Pt seen and examined at bedside. Reports mild abdominal discomfort overnight. Large soft BM this morning; reports all BM are dark. Drop in Hb noted but repeat stable. Hem/onc follow up requested.    ROS: No chest pain, palpitations, SOB, light headedness, dizziness, headache, nausea/vomiting, fevers/chills, abdominal pain, dysuria or increased urinary frequency.    ICU Vital Signs Last 24 Hrs  T(C): 36.8 (09 Jan 2024 16:04), Max: 36.8 (09 Jan 2024 16:04)  T(F): 98.3 (09 Jan 2024 16:04), Max: 98.3 (09 Jan 2024 16:04)  HR: 83 (09 Jan 2024 16:04) (83 - 89)  BP: 96/59 (09 Jan 2024 16:04) (93/60 - 97/61)  RR: 18 (09 Jan 2024 16:04) (17 - 19)  SpO2: 95% (09 Jan 2024 16:04) (95% - 100%)    O2 Parameters below as of 09 Jan 2024 11:06  Patient On (Oxygen Delivery Method): room air      MEDICATIONS  (STANDING):  artificial  tears Solution 1 Drop(s) Both EYES two times a day  chlorhexidine 2% Cloths 1 Application(s) Topical <User Schedule>  FLUoxetine 80 milliGRAM(s) Oral at bedtime  folic acid 1 milliGRAM(s) Oral daily  hydrocortisone hemorrhoidal Suppository 1 Suppository(s) Rectal at bedtime  memantine 10 milliGRAM(s) Oral two times a day  methadone    Tablet 20 milliGRAM(s) Oral at bedtime  multivitamin 1 Tablet(s) Oral daily  naloxegol 25 milliGRAM(s) Oral daily  pantoprazole  Injectable 40 milliGRAM(s) IV Push every 12 hours  piperacillin/tazobactam IVPB.. 3.375 Gram(s) IV Intermittent every 8 hours  polyethylene glycol 3350 17 Gram(s) Oral at bedtime  pregabalin 150 milliGRAM(s) Oral three times a day  sodium chloride 0.9% lock flush 3 milliLiter(s) IV Push every 8 hours    MEDICATIONS  (PRN):  acetaminophen     Tablet .. 650 milliGRAM(s) Oral every 6 hours PRN Temp greater or equal to 38C (100.4F), Mild Pain (1 - 3)  aluminum hydroxide/magnesium hydroxide/simethicone Suspension 30 milliLiter(s) Oral every 4 hours PRN Dyspepsia  glycerin Suppository - Adult 1 Suppository(s) Rectal at bedtime PRN Constipation  HYDROmorphone   Tablet 4 milliGRAM(s) Oral every 4 hours PRN Severe Pain (7 - 10)  HYDROmorphone  Injectable 0.5 milliGRAM(s) IV Push every 4 hours PRN breakthough pain  LORazepam     Tablet 0.5 milliGRAM(s) Oral two times a day PRN Anxiety  melatonin 3 milliGRAM(s) Oral at bedtime PRN Insomnia  naloxone Injectable 0.1 milliGRAM(s) IV Push every 3 minutes PRN For ANY of the following changes in patient status:  A. RR LESS THAN 10 breaths per minute, B. Oxygen saturation LESS THAN 90%, C. Sedation score of 6  ondansetron Injectable 4 milliGRAM(s) IV Push every 8 hours PRN Nausea and/or Vomiting      LABS:                        8.4    x     )-----------( x        ( 09 Jan 2024 08:48 )             28.5     01-09    142  |  109<H>  |  11.2  ----------------------------<  84  3.9   |  24.0  |  0.39<L>    Ca    7.8<L>      09 Jan 2024 08:48  Phos  2.8     01-09  Mg     2.3     01-09        Urinalysis Basic - ( 09 Jan 2024 08:48 )    Color: x / Appearance: x / SG: x / pH: x  Gluc: 84 mg/dL / Ketone: x  / Bili: x / Urobili: x   Blood: x / Protein: x / Nitrite: x   Leuk Esterase: x / RBC: x / WBC x   Sq Epi: x / Non Sq Epi: x / Bacteria: x    PHYSICAL EXAM:    GENERAL: middle aged female, sitting in bed, NAD  HEAD:  Atraumatic, Normocephalic  EYES: EOMI, PERRLA, conjunctiva and sclera clear  ENMT: Moist mucous membranes  NECK: Supple  NERVOUS SYSTEM:  Alert & Oriented X3, Motor Strength 5/5 B/L upper and lower extremities   CHEST/LUNG: Clear to auscultation bilaterally   HEART: Regular rate and rhythm; +S1/S2  ABDOMEN: Soft, Nontender, Nondistended; Bowel sounds present  EXTREMITIES:  no pedal edema

## 2024-01-09 NOTE — PROGRESS NOTE ADULT - NS ATTEND AMEND GEN_ALL_CORE FT
Ms. Dave is a 38 year old woman with significant past medical history of Stage IV breast cancer with diffuse osseous metastasis who presented with recurrent symptomatic amenia requiring frequent transfusions also found to be RSV positive. GI consulted for amenia and dark colored stools. She has a history of similar in the past for which she has undergone EGD/Colonoscopy/ Video capsule endoscopy which were positive for enteritis and internal hemorrhoids without overt hemorrhage identified. Suspect anemia likely multifactorial given severity of underlying disease, chemotherapy and gastritis/enteritis. Repeat endoscopy (see above), post procedure hemoglobin remains relatively stable. No further intervention planned at this time. Interpreted images and updated labs. Discussed plan of care with patient and covering provider in detail. All questions answered and concerns addressed. Given no further inpatient work planned would continue supportive care with outpatient follow up.    Symptomatic anemia.  Acute on chronic anemia.  Anemia of chronic disease.  Dark colored stools (improved).    Plan:   Agree with plan as outlined above.  Restrictive transfusion protocol.  Continue PPI PO BID for 8 weeks.  Outpatient follow up.

## 2024-01-09 NOTE — PROGRESS NOTE ADULT - ASSESSMENT
39 yo F known patient of Dr Chapa at [facility], metastatic breast ca on Enhertu, GIB who was admitted to the hospital due to severe anemia and GIB.     1) Metastatic Breast ca  as above  on Enhertu. Last dose on 12/20/2023.  PET CT showed stable disease.  f/u with Dr Chapa upon DC  no inpatient intervention    2) Anemia  -Patient was diagnosed in 2021 when MRI  lumbar spine demonstrated possible infiltration with metastatic disease on bone marrow.  Patient has metastatic disease to bone, and can not rule out bone marrow infiltration which can contribute to cytopenias.  Also the patient has had received multiple doses of Enhertu which can cause cytopenias, and likely worsening of anemia recently could be multifactorial including possible bone marrow infiltration with malignancy and Enhertu, especially given the GI workup has been unremarkable for GI related blood loss  -options include continuation of LUIS ANGEL to keep hemoglobin more than 10.0 and supportive PRBC transfusion  -patient to keep appointment with primary oncologist Dr. Chapa upon discharge  -might benefit from bone marrow biopsy on outpt basis  -can be discharged from Hematology Oncology standpoint if hemoglobin is stable tomorrow  -Endoscopy performed on 11/8/24 essentially unremarkable as noted above. as per Gastroenterology no indication for repeat colonoscopy given the recent colonoscopies and capsule endoscopy  (VCE on 12/11/23 revealed enteritis. Colonoscopy on 11/20 showed internal hemorrhoids. EGD/ colonoscopy (10/2023) at [place] showed antral erythema, colonoscopy poor prep).    3) Leukopenia  recent chemo  keep ANC > 1000  wbc stable  No fever.    4) DVT ppx    will follow  39 yo F known patient of Dr Chapa at [facility], metastatic breast ca on Enhertu, GIB who was admitted to the hospital due to severe anemia and GIB.     1) Metastatic Breast ca  as above  on Enhertu. Last dose on 12/20/2023.  PET CT showed stable disease.  f/u with Dr Chapa upon DC  no inpatient intervention    2) Anemia  -Patient was diagnosed in 2021 when MRI  lumbar spine demonstrated possible infiltration with metastatic disease on bone marrow.  Patient has metastatic disease to bone, and can not rule out bone marrow infiltration which can contribute to cytopenias.  Also the patient has had received multiple doses of Enhertu which can cause cytopenias, and likely worsening of anemia recently could be multifactorial including possible bone marrow infiltration with malignancy and Enhertu, especially given the GI workup has been unremarkable for GI related blood loss  -options include continuation of LUIS ANGEL to keep hemoglobin more than 10.0 and supportive PRBC transfusion  -patient to keep appointment with primary oncologist Dr. Chapa upon discharge  -might benefit from bone marrow biopsy on outpt basis  -can be discharged from Hematology Oncology standpoint if hemoglobin is stable tomorrow  -Endoscopy performed on 11/8/24 essentially unremarkable as noted above. as per Gastroenterology no indication for repeat colonoscopy given the recent colonoscopies and capsule endoscopy  (VCE on 12/11/23 revealed enteritis. Colonoscopy on 11/20 showed internal hemorrhoids. EGD/ colonoscopy (10/2023) at [place] showed antral erythema, colonoscopy poor prep).    3) Leukopenia  recent chemo  keep ANC > 1000  wbc stable  No fever.    4) DVT ppx    will follow

## 2024-01-09 NOTE — PROGRESS NOTE ADULT - SUBJECTIVE AND OBJECTIVE BOX
Interval Hx:  Patient seen during rounds  Patient reports pain to be controlled on current medications  Patient denies sedation with medications     Analgesic Dosing for past 24 hours reviewed as below:    FLUoxetine   80 milliGRAM(s) Oral (01-08-24 @ 22:01)    HYDROmorphone   Tablet   4 milliGRAM(s) Oral (01-09-24 @ 12:15)   4 milliGRAM(s) Oral (01-09-24 @ 06:07)   4 milliGRAM(s) Oral (01-09-24 @ 01:57)   4 milliGRAM(s) Oral (01-08-24 @ 20:12)    HYDROmorphone  Injectable   0.5 milliGRAM(s) IV Push (01-09-24 @ 08:23)   0.5 milliGRAM(s) IV Push (01-08-24 @ 23:03)    LORazepam     Tablet   0.5 milliGRAM(s) Oral (01-08-24 @ 22:01)    melatonin   3 milliGRAM(s) Oral (01-08-24 @ 22:01)    memantine   10 milliGRAM(s) Oral (01-09-24 @ 06:03)   10 milliGRAM(s) Oral (01-08-24 @ 17:56)    methadone    Tablet   10 milliGRAM(s) Oral (01-09-24 @ 06:03)   10 milliGRAM(s) Oral (01-08-24 @ 13:16)    methadone    Tablet   15 milliGRAM(s) Oral (01-08-24 @ 22:01)    pregabalin   150 milliGRAM(s) Oral (01-09-24 @ 06:03)   150 milliGRAM(s) Oral (01-08-24 @ 22:01)   150 milliGRAM(s) Oral (01-08-24 @ 13:16)          T(C): 36.6 (01-09-24 @ 11:06), Max: 36.9 (01-08-24 @ 15:00)  HR: 89 (01-09-24 @ 11:06) (85 - 89)  BP: 93/60 (01-09-24 @ 11:06) (93/60 - 111/72)  RR: 19 (01-09-24 @ 11:06) (17 - 19)  SpO2: 95% (01-09-24 @ 11:06) (95% - 100%)        acetaminophen     Tablet .. 650 milliGRAM(s) Oral every 6 hours PRN  aluminum hydroxide/magnesium hydroxide/simethicone Suspension 30 milliLiter(s) Oral every 4 hours PRN  artificial  tears Solution 1 Drop(s) Both EYES two times a day  chlorhexidine 2% Cloths 1 Application(s) Topical <User Schedule>  FLUoxetine 80 milliGRAM(s) Oral at bedtime  folic acid 1 milliGRAM(s) Oral daily  glycerin Suppository - Adult 1 Suppository(s) Rectal at bedtime PRN  hydrocortisone hemorrhoidal Suppository 1 Suppository(s) Rectal at bedtime  HYDROmorphone   Tablet 4 milliGRAM(s) Oral every 4 hours PRN  HYDROmorphone  Injectable 0.5 milliGRAM(s) IV Push every 4 hours PRN  LORazepam     Tablet 0.5 milliGRAM(s) Oral two times a day PRN  melatonin 3 milliGRAM(s) Oral at bedtime PRN  memantine 10 milliGRAM(s) Oral two times a day  methadone    Tablet 10 milliGRAM(s) Oral <User Schedule>  methadone    Tablet 15 milliGRAM(s) Oral at bedtime  multivitamin 1 Tablet(s) Oral daily  naloxegol 25 milliGRAM(s) Oral daily  naloxone Injectable 0.1 milliGRAM(s) IV Push every 3 minutes PRN  ondansetron Injectable 4 milliGRAM(s) IV Push every 8 hours PRN  pantoprazole  Injectable 40 milliGRAM(s) IV Push every 12 hours  piperacillin/tazobactam IVPB.. 3.375 Gram(s) IV Intermittent every 8 hours  polyethylene glycol 3350 17 Gram(s) Oral at bedtime  pregabalin 150 milliGRAM(s) Oral three times a day  sodium chloride 0.9% lock flush 3 milliLiter(s) IV Push every 8 hours                          8.4    x     )-----------( x        ( 09 Jan 2024 08:48 )             28.5     01-09    142  |  109<H>  |  11.2  ----------------------------<  84  3.9   |  24.0  |  0.39<L>    Ca    7.8<L>      09 Jan 2024 08:48  Phos  2.8     01-09  Mg     2.3     01-09        Urinalysis Basic - ( 09 Jan 2024 08:48 )    Color: x / Appearance: x / SG: x / pH: x  Gluc: 84 mg/dL / Ketone: x  / Bili: x / Urobili: x   Blood: x / Protein: x / Nitrite: x   Leuk Esterase: x / RBC: x / WBC x   Sq Epi: x / Non Sq Epi: x / Bacteria: x        Pain Service   [phone] Interval Hx:  Patient seen during rounds  Patient reports pain to be controlled on current medications  Patient denies sedation with medications     Analgesic Dosing for past 24 hours reviewed as below:    FLUoxetine   80 milliGRAM(s) Oral (01-08-24 @ 22:01)    HYDROmorphone   Tablet   4 milliGRAM(s) Oral (01-09-24 @ 12:15)   4 milliGRAM(s) Oral (01-09-24 @ 06:07)   4 milliGRAM(s) Oral (01-09-24 @ 01:57)   4 milliGRAM(s) Oral (01-08-24 @ 20:12)    HYDROmorphone  Injectable   0.5 milliGRAM(s) IV Push (01-09-24 @ 08:23)   0.5 milliGRAM(s) IV Push (01-08-24 @ 23:03)    LORazepam     Tablet   0.5 milliGRAM(s) Oral (01-08-24 @ 22:01)    melatonin   3 milliGRAM(s) Oral (01-08-24 @ 22:01)    memantine   10 milliGRAM(s) Oral (01-09-24 @ 06:03)   10 milliGRAM(s) Oral (01-08-24 @ 17:56)    methadone    Tablet   10 milliGRAM(s) Oral (01-09-24 @ 06:03)   10 milliGRAM(s) Oral (01-08-24 @ 13:16)    methadone    Tablet   15 milliGRAM(s) Oral (01-08-24 @ 22:01)    pregabalin   150 milliGRAM(s) Oral (01-09-24 @ 06:03)   150 milliGRAM(s) Oral (01-08-24 @ 22:01)   150 milliGRAM(s) Oral (01-08-24 @ 13:16)          T(C): 36.6 (01-09-24 @ 11:06), Max: 36.9 (01-08-24 @ 15:00)  HR: 89 (01-09-24 @ 11:06) (85 - 89)  BP: 93/60 (01-09-24 @ 11:06) (93/60 - 111/72)  RR: 19 (01-09-24 @ 11:06) (17 - 19)  SpO2: 95% (01-09-24 @ 11:06) (95% - 100%)        acetaminophen     Tablet .. 650 milliGRAM(s) Oral every 6 hours PRN  aluminum hydroxide/magnesium hydroxide/simethicone Suspension 30 milliLiter(s) Oral every 4 hours PRN  artificial  tears Solution 1 Drop(s) Both EYES two times a day  chlorhexidine 2% Cloths 1 Application(s) Topical <User Schedule>  FLUoxetine 80 milliGRAM(s) Oral at bedtime  folic acid 1 milliGRAM(s) Oral daily  glycerin Suppository - Adult 1 Suppository(s) Rectal at bedtime PRN  hydrocortisone hemorrhoidal Suppository 1 Suppository(s) Rectal at bedtime  HYDROmorphone   Tablet 4 milliGRAM(s) Oral every 4 hours PRN  HYDROmorphone  Injectable 0.5 milliGRAM(s) IV Push every 4 hours PRN  LORazepam     Tablet 0.5 milliGRAM(s) Oral two times a day PRN  melatonin 3 milliGRAM(s) Oral at bedtime PRN  memantine 10 milliGRAM(s) Oral two times a day  methadone    Tablet 10 milliGRAM(s) Oral <User Schedule>  methadone    Tablet 15 milliGRAM(s) Oral at bedtime  multivitamin 1 Tablet(s) Oral daily  naloxegol 25 milliGRAM(s) Oral daily  naloxone Injectable 0.1 milliGRAM(s) IV Push every 3 minutes PRN  ondansetron Injectable 4 milliGRAM(s) IV Push every 8 hours PRN  pantoprazole  Injectable 40 milliGRAM(s) IV Push every 12 hours  piperacillin/tazobactam IVPB.. 3.375 Gram(s) IV Intermittent every 8 hours  polyethylene glycol 3350 17 Gram(s) Oral at bedtime  pregabalin 150 milliGRAM(s) Oral three times a day  sodium chloride 0.9% lock flush 3 milliLiter(s) IV Push every 8 hours                          8.4    x     )-----------( x        ( 09 Jan 2024 08:48 )             28.5     01-09    142  |  109<H>  |  11.2  ----------------------------<  84  3.9   |  24.0  |  0.39<L>    Ca    7.8<L>      09 Jan 2024 08:48  Phos  2.8     01-09  Mg     2.3     01-09        Urinalysis Basic - ( 09 Jan 2024 08:48 )    Color: x / Appearance: x / SG: x / pH: x  Gluc: 84 mg/dL / Ketone: x  / Bili: x / Urobili: x   Blood: x / Protein: x / Nitrite: x   Leuk Esterase: x / RBC: x / WBC x   Sq Epi: x / Non Sq Epi: x / Bacteria: x        Pain Service   [phone]

## 2024-01-09 NOTE — PROGRESS NOTE ADULT - SUBJECTIVE AND OBJECTIVE BOX
39 yo F known patient of Dr Chapa at [facility], metastatic breast ca on Enhertu and with prior GIB requiring blood transfusion is admitted to the hospital again due to symptomatic anemia.  Her hemoglobin was found to be 7.5.  She received 1 unit of PRBC and hemoglobin is 8.2 now.  She also has chronic cancer-related pain noted in her back.  She says that in the emergency room she received Dilaudid 2 mg IV which seem to help but says that as inpatient she received a dose of Dilaudid 1 mg which was not enough and would like to request a higher dose.  She says that last night her bowel movement was very dark and she feels it was likely mixed with blood.   She had a PET-CT and brain MRI done within the past few days which overall showed stable disease. Colitis noted on CT. She has been started on IV antibiotics.   CTE showing gastritis    1/9/24: No acute overnight events noted.  Hemoglobin 8.4 from 9.0 yesterday.  EGD performed yesterday. Pain well controlled on PCA pump.  Being followed by pain management. Pain is moderately well controlled on PCA pump. Denies vomiting.      Gen - alert and oriented  Heart - s1s2 RRR  Lung - CTA b/l  Abd - mild tender  Ext no edema    Vital Signs Last 24 Hrs  T(C): 36.8 (09 Jan 2024 16:04), Max: 36.8 (09 Jan 2024 16:04)  T(F): 98.3 (09 Jan 2024 16:04), Max: 98.3 (09 Jan 2024 16:04)  HR: 83 (09 Jan 2024 16:04) (83 - 89)  BP: 96/59 (09 Jan 2024 16:04) (93/60 - 97/61)  BP(mean): --  RR: 18 (09 Jan 2024 16:04) (17 - 19)  SpO2: 95% (09 Jan 2024 16:04) (95% - 100%)    Parameters below as of 09 Jan 2024 11:06  Patient On (Oxygen Delivery Method): room air    MEDICATIONS  (STANDING):  artificial  tears Solution 1 Drop(s) Both EYES two times a day  chlorhexidine 2% Cloths 1 Application(s) Topical <User Schedule>  FLUoxetine 80 milliGRAM(s) Oral at bedtime  folic acid 1 milliGRAM(s) Oral daily  hydrocortisone hemorrhoidal Suppository 1 Suppository(s) Rectal at bedtime  memantine 10 milliGRAM(s) Oral two times a day  methadone    Tablet 20 milliGRAM(s) Oral at bedtime  multivitamin 1 Tablet(s) Oral daily  naloxegol 25 milliGRAM(s) Oral daily  pantoprazole  Injectable 40 milliGRAM(s) IV Push every 12 hours  piperacillin/tazobactam IVPB.. 3.375 Gram(s) IV Intermittent every 8 hours  polyethylene glycol 3350 17 Gram(s) Oral at bedtime  pregabalin 150 milliGRAM(s) Oral three times a day  sodium chloride 0.9% lock flush 3 milliLiter(s) IV Push every 8 hours   37 yo F known patient of Dr Chapa at [facility], metastatic breast ca on Enhertu and with prior GIB requiring blood transfusion is admitted to the hospital again due to symptomatic anemia.  Her hemoglobin was found to be 7.5.  She received 1 unit of PRBC and hemoglobin is 8.2 now.  She also has chronic cancer-related pain noted in her back.  She says that in the emergency room she received Dilaudid 2 mg IV which seem to help but says that as inpatient she received a dose of Dilaudid 1 mg which was not enough and would like to request a higher dose.  She says that last night her bowel movement was very dark and she feels it was likely mixed with blood.   She had a PET-CT and brain MRI done within the past few days which overall showed stable disease. Colitis noted on CT. She has been started on IV antibiotics.   CTE showing gastritis    1/9/24: No acute overnight events noted.  Hemoglobin 8.4 from 9.0 yesterday.  EGD performed yesterday. Pain well controlled on PCA pump.  Being followed by pain management. Pain is moderately well controlled on PCA pump. Denies vomiting.      Gen - alert and oriented  Heart - s1s2 RRR  Lung - CTA b/l  Abd - mild tender  Ext no edema    Vital Signs Last 24 Hrs  T(C): 36.8 (09 Jan 2024 16:04), Max: 36.8 (09 Jan 2024 16:04)  T(F): 98.3 (09 Jan 2024 16:04), Max: 98.3 (09 Jan 2024 16:04)  HR: 83 (09 Jan 2024 16:04) (83 - 89)  BP: 96/59 (09 Jan 2024 16:04) (93/60 - 97/61)  BP(mean): --  RR: 18 (09 Jan 2024 16:04) (17 - 19)  SpO2: 95% (09 Jan 2024 16:04) (95% - 100%)    Parameters below as of 09 Jan 2024 11:06  Patient On (Oxygen Delivery Method): room air    MEDICATIONS  (STANDING):  artificial  tears Solution 1 Drop(s) Both EYES two times a day  chlorhexidine 2% Cloths 1 Application(s) Topical <User Schedule>  FLUoxetine 80 milliGRAM(s) Oral at bedtime  folic acid 1 milliGRAM(s) Oral daily  hydrocortisone hemorrhoidal Suppository 1 Suppository(s) Rectal at bedtime  memantine 10 milliGRAM(s) Oral two times a day  methadone    Tablet 20 milliGRAM(s) Oral at bedtime  multivitamin 1 Tablet(s) Oral daily  naloxegol 25 milliGRAM(s) Oral daily  pantoprazole  Injectable 40 milliGRAM(s) IV Push every 12 hours  piperacillin/tazobactam IVPB.. 3.375 Gram(s) IV Intermittent every 8 hours  polyethylene glycol 3350 17 Gram(s) Oral at bedtime  pregabalin 150 milliGRAM(s) Oral three times a day  sodium chloride 0.9% lock flush 3 milliLiter(s) IV Push every 8 hours

## 2024-01-10 ENCOUNTER — TRANSCRIPTION ENCOUNTER (OUTPATIENT)
Age: 39
End: 2024-01-10

## 2024-01-10 VITALS
RESPIRATION RATE: 19 BRPM | OXYGEN SATURATION: 97 % | TEMPERATURE: 98 F | HEART RATE: 85 BPM | SYSTOLIC BLOOD PRESSURE: 115 MMHG | DIASTOLIC BLOOD PRESSURE: 75 MMHG

## 2024-01-10 LAB
HCT VFR BLD CALC: 25.6 % — LOW (ref 34.5–45)
HCT VFR BLD CALC: 25.6 % — LOW (ref 34.5–45)
HGB BLD-MCNC: 7.9 G/DL — LOW (ref 11.5–15.5)
HGB BLD-MCNC: 7.9 G/DL — LOW (ref 11.5–15.5)
MCHC RBC-ENTMCNC: 28.4 PG — SIGNIFICANT CHANGE UP (ref 27–34)
MCHC RBC-ENTMCNC: 28.4 PG — SIGNIFICANT CHANGE UP (ref 27–34)
MCHC RBC-ENTMCNC: 30.9 GM/DL — LOW (ref 32–36)
MCHC RBC-ENTMCNC: 30.9 GM/DL — LOW (ref 32–36)
MCV RBC AUTO: 92.1 FL — SIGNIFICANT CHANGE UP (ref 80–100)
MCV RBC AUTO: 92.1 FL — SIGNIFICANT CHANGE UP (ref 80–100)
PLATELET # BLD AUTO: 243 K/UL — SIGNIFICANT CHANGE UP (ref 150–400)
PLATELET # BLD AUTO: 243 K/UL — SIGNIFICANT CHANGE UP (ref 150–400)
RBC # BLD: 2.78 M/UL — LOW (ref 3.8–5.2)
RBC # BLD: 2.78 M/UL — LOW (ref 3.8–5.2)
RBC # FLD: 19.8 % — HIGH (ref 10.3–14.5)
RBC # FLD: 19.8 % — HIGH (ref 10.3–14.5)
WBC # BLD: 3.06 K/UL — LOW (ref 3.8–10.5)
WBC # BLD: 3.06 K/UL — LOW (ref 3.8–10.5)
WBC # FLD AUTO: 3.06 K/UL — LOW (ref 3.8–10.5)
WBC # FLD AUTO: 3.06 K/UL — LOW (ref 3.8–10.5)

## 2024-01-10 PROCEDURE — 87641 MR-STAPH DNA AMP PROBE: CPT

## 2024-01-10 PROCEDURE — 85018 HEMOGLOBIN: CPT

## 2024-01-10 PROCEDURE — 86900 BLOOD TYPING SEROLOGIC ABO: CPT

## 2024-01-10 PROCEDURE — 83550 IRON BINDING TEST: CPT

## 2024-01-10 PROCEDURE — 74177 CT ABD & PELVIS W/CONTRAST: CPT

## 2024-01-10 PROCEDURE — 85610 PROTHROMBIN TIME: CPT

## 2024-01-10 PROCEDURE — 36430 TRANSFUSION BLD/BLD COMPNT: CPT

## 2024-01-10 PROCEDURE — 85730 THROMBOPLASTIN TIME PARTIAL: CPT

## 2024-01-10 PROCEDURE — 80048 BASIC METABOLIC PNL TOTAL CA: CPT

## 2024-01-10 PROCEDURE — 85025 COMPLETE CBC W/AUTO DIFF WBC: CPT

## 2024-01-10 PROCEDURE — 99239 HOSP IP/OBS DSCHRG MGMT >30: CPT

## 2024-01-10 PROCEDURE — 87640 STAPH A DNA AMP PROBE: CPT

## 2024-01-10 PROCEDURE — 84100 ASSAY OF PHOSPHORUS: CPT

## 2024-01-10 PROCEDURE — 96376 TX/PRO/DX INJ SAME DRUG ADON: CPT

## 2024-01-10 PROCEDURE — P9016: CPT

## 2024-01-10 PROCEDURE — 86880 COOMBS TEST DIRECT: CPT

## 2024-01-10 PROCEDURE — 86850 RBC ANTIBODY SCREEN: CPT

## 2024-01-10 PROCEDURE — 96374 THER/PROPH/DIAG INJ IV PUSH: CPT

## 2024-01-10 PROCEDURE — 99285 EMERGENCY DEPT VISIT HI MDM: CPT | Mod: 25

## 2024-01-10 PROCEDURE — 80053 COMPREHEN METABOLIC PANEL: CPT

## 2024-01-10 PROCEDURE — 86870 RBC ANTIBODY IDENTIFICATION: CPT

## 2024-01-10 PROCEDURE — 83605 ASSAY OF LACTIC ACID: CPT

## 2024-01-10 PROCEDURE — 83735 ASSAY OF MAGNESIUM: CPT

## 2024-01-10 PROCEDURE — 86922 COMPATIBILITY TEST ANTIGLOB: CPT

## 2024-01-10 PROCEDURE — 86901 BLOOD TYPING SEROLOGIC RH(D): CPT

## 2024-01-10 PROCEDURE — 0225U NFCT DS DNA&RNA 21 SARSCOV2: CPT

## 2024-01-10 PROCEDURE — 83540 ASSAY OF IRON: CPT

## 2024-01-10 PROCEDURE — 84466 ASSAY OF TRANSFERRIN: CPT

## 2024-01-10 PROCEDURE — 82272 OCCULT BLD FECES 1-3 TESTS: CPT

## 2024-01-10 PROCEDURE — 85014 HEMATOCRIT: CPT

## 2024-01-10 PROCEDURE — 85027 COMPLETE CBC AUTOMATED: CPT

## 2024-01-10 PROCEDURE — 36415 COLL VENOUS BLD VENIPUNCTURE: CPT

## 2024-01-10 PROCEDURE — 82728 ASSAY OF FERRITIN: CPT

## 2024-01-10 RX ORDER — METHADONE HYDROCHLORIDE 40 MG/1
1 TABLET ORAL
Qty: 0 | Refills: 0 | DISCHARGE
Start: 2024-01-10

## 2024-01-10 RX ORDER — FLUOXETINE HCL 10 MG
2 CAPSULE ORAL
Qty: 0 | Refills: 0 | DISCHARGE
Start: 2024-01-10

## 2024-01-10 RX ORDER — METHADONE HYDROCHLORIDE 40 MG/1
3 TABLET ORAL
Refills: 0 | DISCHARGE

## 2024-01-10 RX ORDER — POLYETHYLENE GLYCOL 3350 17 G/17G
17 POWDER, FOR SOLUTION ORAL
Qty: 510 | Refills: 0
Start: 2024-01-10 | End: 2024-02-08

## 2024-01-10 RX ORDER — METHADONE HYDROCHLORIDE 40 MG/1
2 TABLET ORAL
Qty: 0 | Refills: 0 | DISCHARGE
Start: 2024-01-10

## 2024-01-10 RX ORDER — METHADONE HYDROCHLORIDE 40 MG/1
1 TABLET ORAL
Refills: 0 | DISCHARGE

## 2024-01-10 RX ORDER — PANTOPRAZOLE SODIUM 20 MG/1
1 TABLET, DELAYED RELEASE ORAL
Qty: 60 | Refills: 0
Start: 2024-01-10 | End: 2024-02-08

## 2024-01-10 RX ORDER — FOLIC ACID 0.8 MG
1 TABLET ORAL
Qty: 30 | Refills: 0
Start: 2024-01-10 | End: 2024-02-08

## 2024-01-10 RX ADMIN — Medication 150 MILLIGRAM(S): at 05:33

## 2024-01-10 RX ADMIN — METHADONE HYDROCHLORIDE 10 MILLIGRAM(S): 40 TABLET ORAL at 05:33

## 2024-01-10 RX ADMIN — MEMANTINE HYDROCHLORIDE 10 MILLIGRAM(S): 10 TABLET ORAL at 05:33

## 2024-01-10 RX ADMIN — HYDROMORPHONE HYDROCHLORIDE 4 MILLIGRAM(S): 2 INJECTION INTRAMUSCULAR; INTRAVENOUS; SUBCUTANEOUS at 11:11

## 2024-01-10 RX ADMIN — SODIUM CHLORIDE 3 MILLILITER(S): 9 INJECTION INTRAMUSCULAR; INTRAVENOUS; SUBCUTANEOUS at 14:26

## 2024-01-10 RX ADMIN — Medication 1 MILLIGRAM(S): at 11:11

## 2024-01-10 RX ADMIN — CHLORHEXIDINE GLUCONATE 1 APPLICATION(S): 213 SOLUTION TOPICAL at 08:03

## 2024-01-10 RX ADMIN — HYDROMORPHONE HYDROCHLORIDE 4 MILLIGRAM(S): 2 INJECTION INTRAMUSCULAR; INTRAVENOUS; SUBCUTANEOUS at 05:32

## 2024-01-10 RX ADMIN — Medication 150 MILLIGRAM(S): at 14:32

## 2024-01-10 RX ADMIN — SODIUM CHLORIDE 3 MILLILITER(S): 9 INJECTION INTRAMUSCULAR; INTRAVENOUS; SUBCUTANEOUS at 05:29

## 2024-01-10 RX ADMIN — NALOXEGOL OXALATE 25 MILLIGRAM(S): 12.5 TABLET, FILM COATED ORAL at 14:31

## 2024-01-10 RX ADMIN — Medication 1 TABLET(S): at 11:11

## 2024-01-10 RX ADMIN — PIPERACILLIN AND TAZOBACTAM 25 GRAM(S): 4; .5 INJECTION, POWDER, LYOPHILIZED, FOR SOLUTION INTRAVENOUS at 05:34

## 2024-01-10 RX ADMIN — METHADONE HYDROCHLORIDE 10 MILLIGRAM(S): 40 TABLET ORAL at 14:31

## 2024-01-10 RX ADMIN — PANTOPRAZOLE SODIUM 40 MILLIGRAM(S): 20 TABLET, DELAYED RELEASE ORAL at 05:33

## 2024-01-10 NOTE — PROGRESS NOTE ADULT - REASON FOR ADMISSION
Symptomatic anemia

## 2024-01-10 NOTE — DIETITIAN INITIAL EVALUATION ADULT - PERTINENT LABORATORY DATA
01-09    142  |  109<H>  |  11.2  ----------------------------<  84  3.9   |  24.0  |  0.39<L>    Ca    7.8<L>      09 Jan 2024 08:48  Phos  2.8     01-09  Mg     2.3     01-09

## 2024-01-10 NOTE — DISCHARGE NOTE NURSING/CASE MANAGEMENT/SOCIAL WORK - PATIENT PORTAL LINK FT
You can access the FollowMyHealth Patient Portal offered by [facility] by registering at the following website: [email or web address]. By joining [facility]’s FollowMyHealth portal, you will also be able to view your health information using other applications (apps) compatible with our system. You can access the FollowMyHealth Patient Portal offered by [facility] by registering at the following website: [email or web address]. By joining [employer]’s FollowMyHealth portal, you will also be able to view your health information using other applications (apps) compatible with our system.

## 2024-01-10 NOTE — PROGRESS NOTE ADULT - PROVIDER SPECIALTY LIST ADULT
Airway  Urgency: elective    Date/Time: 5/12/2022 7:04 AM  Airway not difficult    General Information and Staff    Patient location during procedure: OR  CRNA/CAA: Adam Turner CRNA    Indications and Patient Condition  Indications for airway management: airway protection    Preoxygenated: yes  Mask difficulty assessment: 1 - vent by mask    Final Airway Details  Final airway type: endotracheal airway      Successful airway: ETT  Cuffed: yes   Successful intubation technique: video laryngoscopy  Facilitating devices/methods: intubating stylet  Endotracheal tube insertion site: oral  Blade: Watkins  Blade size: 3  ETT size (mm): 7.0  Cormack-Lehane Classification: grade IIa - partial view of glottis  Placement verified by: chest auscultation and capnometry   Cuff volume (mL): 8  Measured from: teeth  ETT/EBT  to teeth (cm): 21  Number of attempts at approach: 1  Assessment: lips, teeth, and gum same as pre-op and atraumatic intubation    Additional Comments  Patient intubated with neck in neutral position using the watkins blade            
Gastroenterology
Heme/Onc
Hospitalist
Hospitalist
Pain Medicine
Hospitalist
Heme/Onc
Hospitalist
Hospitalist
Pain Medicine
Heme/Onc
Gastroenterology
Heme/Onc
Heme/Onc
Pain Medicine
Pain Medicine
Gastroenterology
Gastroenterology
Heme/Onc
Heme/Onc
Hospitalist
Hospitalist
Pain Medicine

## 2024-01-10 NOTE — DIETITIAN INITIAL EVALUATION ADULT - PERTINENT MEDS FT
MEDICATIONS  (STANDING):  artificial  tears Solution 1 Drop(s) Both EYES two times a day  chlorhexidine 2% Cloths 1 Application(s) Topical <User Schedule>  FLUoxetine 80 milliGRAM(s) Oral at bedtime  folic acid 1 milliGRAM(s) Oral daily  hydrocortisone hemorrhoidal Suppository 1 Suppository(s) Rectal at bedtime  memantine 10 milliGRAM(s) Oral two times a day  methadone    Tablet 10 milliGRAM(s) Oral <User Schedule>  methadone    Tablet 20 milliGRAM(s) Oral at bedtime  multivitamin 1 Tablet(s) Oral daily  naloxegol 25 milliGRAM(s) Oral daily  pantoprazole  Injectable 40 milliGRAM(s) IV Push every 12 hours  piperacillin/tazobactam IVPB.. 3.375 Gram(s) IV Intermittent every 8 hours  polyethylene glycol 3350 17 Gram(s) Oral at bedtime  pregabalin 150 milliGRAM(s) Oral three times a day  sodium chloride 0.9% lock flush 3 milliLiter(s) IV Push every 8 hours    MEDICATIONS  (PRN):  acetaminophen     Tablet .. 650 milliGRAM(s) Oral every 6 hours PRN Temp greater or equal to 38C (100.4F), Mild Pain (1 - 3)  aluminum hydroxide/magnesium hydroxide/simethicone Suspension 30 milliLiter(s) Oral every 4 hours PRN Dyspepsia  glycerin Suppository - Adult 1 Suppository(s) Rectal at bedtime PRN Constipation  HYDROmorphone   Tablet 4 milliGRAM(s) Oral every 4 hours PRN Severe Pain (7 - 10)  HYDROmorphone  Injectable 0.5 milliGRAM(s) IV Push every 4 hours PRN breakthough pain  LORazepam     Tablet 0.5 milliGRAM(s) Oral two times a day PRN Anxiety  melatonin 3 milliGRAM(s) Oral at bedtime PRN Insomnia  naloxone Injectable 0.1 milliGRAM(s) IV Push every 3 minutes PRN For ANY of the following changes in patient status:  A. RR LESS THAN 10 breaths per minute, B. Oxygen saturation LESS THAN 90%, C. Sedation score of 6  ondansetron Injectable 4 milliGRAM(s) IV Push every 8 hours PRN Nausea and/or Vomiting

## 2024-01-10 NOTE — DIETITIAN INITIAL EVALUATION ADULT - ORAL INTAKE PTA/DIET HISTORY
Pt reports eating well PTA; reports UBW~135lbs; current weight 134.2lbs. Pt reports following a Regular diet and has been eating well since admission. Encouraged HBV protein foods and fluid intake. Pt with good understanding.

## 2024-01-10 NOTE — DIETITIAN INITIAL EVALUATION ADULT - OTHER INFO
Patient is a 39 y/o female with history of Stage-4 breast cancer admitted with recurrent symptomatic anemia.   Pt admitted with recurrent symptomatic anemia  -Multi-factorial from likely gastritis/colitis, prior chemo, active malignancy; GIB ruled out   Patient is a 37 y/o female with history of Stage-4 breast cancer admitted with recurrent symptomatic anemia.   Pt admitted with recurrent symptomatic anemia  -Multi-factorial from likely gastritis/colitis, prior chemo, active malignancy; GIB ruled out

## 2024-01-10 NOTE — DISCHARGE NOTE NURSING/CASE MANAGEMENT/SOCIAL WORK - NSDCPEFALRISK_GEN_ALL_CORE
For information on Fall & Injury Prevention, visit: [email or web address] OR  [email or web address] OR  https://www.cdc.gov/steadi/patient.html For information on Fall & Injury Prevention, visit: [email or web address] OR  [email or web address] OR  https://www.cdc.gov/steadi/patient.html

## 2024-01-10 NOTE — PROGRESS NOTE ADULT - SUBJECTIVE AND OBJECTIVE BOX
39 yo F known patient of Dr Chapa at [facility], metastatic breast ca on Enhertu and with prior GIB requiring blood transfusion is admitted to the hospital again due to symptomatic anemia.  Her hemoglobin was found to be 7.5.  She received 1 unit of PRBC and hemoglobin is 8.2 now.  She also has chronic cancer-related pain noted in her back.  She says that in the emergency room she received Dilaudid 2 mg IV which seem to help but says that as inpatient she received a dose of Dilaudid 1 mg which was not enough and would like to request a higher dose.  She says that last night her bowel movement was very dark and she feels it was likely mixed with blood.   She had a PET-CT and brain MRI done within the past few days which overall showed stable disease. Colitis noted on CT. She has been started on IV antibiotics.   CTE showing gastritis    1/9/24: No acute overnight events noted.  Hemoglobin 8.4 from 9.0 yesterday.  EGD performed yesterday. Pain well controlled on PCA pump.  Being followed by pain management. Pain is moderately well controlled on PCA pump. Denies vomiting.    1/10/2024 (seen am of 01/10/2024,, note entered now)  Patient is asking to be transfused 1 unit prbc prior to discharge today. She is feeling better. Pain is moderately well controlled. No blood in her stools. No n/v.      Gen - alert and oriented  Heart - s1s2 RRR  Lung - CTA b/l  Abd - mild tender  Ext no edema      Vital Signs Last 24 Hrs  T(C): 36.9 (10 Cristhian 2024 14:50), Max: 36.9 (10 Cristhian 2024 10:58)  T(F): 98.4 (10 Cristhian 2024 14:50), Max: 98.4 (10 Cristhian 2024 10:58)  HR: 85 (10 Cristhian 2024 14:50) (80 - 85)  BP: 115/75 (10 Cristhian 2024 14:50) (93/58 - 115/75)  BP(mean): --  RR: 19 (10 Cristhian 2024 14:50) (19 - 19)  SpO2: 97% (10 Cristhian 2024 14:50) (97% - 97%)    Parameters below as of 10 Cristhian 2024 14:50  Patient On (Oxygen Delivery Method): room air     39 yo F known patient of Dr Chapa at [facility], metastatic breast ca on Enhertu and with prior GIB requiring blood transfusion is admitted to the hospital again due to symptomatic anemia.  Her hemoglobin was found to be 7.5.  She received 1 unit of PRBC and hemoglobin is 8.2 now.  She also has chronic cancer-related pain noted in her back.  She says that in the emergency room she received Dilaudid 2 mg IV which seem to help but says that as inpatient she received a dose of Dilaudid 1 mg which was not enough and would like to request a higher dose.  She says that last night her bowel movement was very dark and she feels it was likely mixed with blood.   She had a PET-CT and brain MRI done within the past few days which overall showed stable disease. Colitis noted on CT. She has been started on IV antibiotics.   CTE showing gastritis    1/9/24: No acute overnight events noted.  Hemoglobin 8.4 from 9.0 yesterday.  EGD performed yesterday. Pain well controlled on PCA pump.  Being followed by pain management. Pain is moderately well controlled on PCA pump. Denies vomiting.    1/10/2024 (seen am of 01/10/2024,, note entered now)  Patient is asking to be transfused 1 unit prbc prior to discharge today. She is feeling better. Pain is moderately well controlled. No blood in her stools. No n/v.      Gen - alert and oriented  Heart - s1s2 RRR  Lung - CTA b/l  Abd - mild tender  Ext no edema      Vital Signs Last 24 Hrs  T(C): 36.9 (10 Cristhian 2024 14:50), Max: 36.9 (10 Cristhian 2024 10:58)  T(F): 98.4 (10 Cristhian 2024 14:50), Max: 98.4 (10 Cristhian 2024 10:58)  HR: 85 (10 Cristhian 2024 14:50) (80 - 85)  BP: 115/75 (10 Cristhian 2024 14:50) (93/58 - 115/75)  BP(mean): --  RR: 19 (10 Cristhian 2024 14:50) (19 - 19)  SpO2: 97% (10 Cristhian 2024 14:50) (97% - 97%)    Parameters below as of 10 Cristhian 2024 14:50  Patient On (Oxygen Delivery Method): room air

## 2024-01-10 NOTE — PROGRESS NOTE ADULT - ASSESSMENT
39 yo F known patient of Dr Chapa at [facility], metastatic breast ca on Enhertu, GIB who was admitted to the hospital due to severe anemia and GIB.     1) Metastatic Breast ca  as above  on Enhertu. Last dose on 12/20/2023.  PET CT showed stable disease.  f/u with Dr Chapa upon DC  no inpatient intervention    2) Anemia  - Likely multifactorial. Possible malignant bone marrow involvement, therapy related with Enhertu and blood loss noted in stools although GI unremarkable so far. GI not agreable for colonoscopy as inpatient despite poor prep on last one. 1/8/24 EGD unrevealing.  Prior colonoscopy was poor prep.  -options include continuation of LUIS ANGEL to keep hemoglobin more than 10.0 and supportive PRBC transfusion  -patient to keep appointment with primary oncologist Dr. Chapa upon discharge  -might benefit from bone marrow biopsy on outpt basis  -can be discharged from Hematology Oncology standpoin  OK with transfusion prior to discharge.  -Endoscopy performed on 1/8/24 essentially unremarkable as noted above. as per Gastroenterology no indication for repeat colonoscopy given the recent colonoscopies (albeit poor preg) and capsule endoscopy  (VCE on 12/11/23 revealed enteritis. Colonoscopy on 11/20 showed internal hemorrhoids. EGD/ colonoscopy (10/2023) at [place] showed antral erythema, colonoscopy poor prep).  transfuse 1 unit prior to discharge.    3) Leukopenia  recent chemo  keep ANC > 1000  wbc stable  No fever.    4) DVT ppx    Discussed with primary team.    will follow  37 yo F known patient of Dr Chapa at [facility], metastatic breast ca on Enhertu, GIB who was admitted to the hospital due to severe anemia and GIB.     1) Metastatic Breast ca  as above  on Enhertu. Last dose on 12/20/2023.  PET CT showed stable disease.  f/u with Dr Chapa upon DC  no inpatient intervention    2) Anemia  - Likely multifactorial. Possible malignant bone marrow involvement, therapy related with Enhertu and blood loss noted in stools although GI unremarkable so far. GI not agreable for colonoscopy as inpatient despite poor prep on last one. 1/8/24 EGD unrevealing.  Prior colonoscopy was poor prep.  -options include continuation of LUIS ANGEL to keep hemoglobin more than 10.0 and supportive PRBC transfusion  -patient to keep appointment with primary oncologist Dr. Chapa upon discharge  -might benefit from bone marrow biopsy on outpt basis  -can be discharged from Hematology Oncology standpoin  OK with transfusion prior to discharge.  -Endoscopy performed on 1/8/24 essentially unremarkable as noted above. as per Gastroenterology no indication for repeat colonoscopy given the recent colonoscopies (albeit poor preg) and capsule endoscopy  (VCE on 12/11/23 revealed enteritis. Colonoscopy on 11/20 showed internal hemorrhoids. EGD/ colonoscopy (10/2023) at [place] showed antral erythema, colonoscopy poor prep).  transfuse 1 unit prior to discharge.    3) Leukopenia  recent chemo  keep ANC > 1000  wbc stable  No fever.    4) DVT ppx    Discussed with primary team.    will follow

## 2024-01-18 NOTE — ED PROVIDER NOTE - EKG ADDITIONAL QUESTION - PERFORMED INDEPENDENT VISUALIZATION
Problem: Discharge Planning  Goal: Discharge to home or other facility with appropriate resources  1/18/2024 0533 by Nereida Edward RN  Outcome: Progressing  1/17/2024 1655 by Sabrina Ventura RN  Outcome: Progressing     Problem: Safety - Adult  Goal: Free from fall injury  1/18/2024 0533 by Nereida Edward RN  Outcome: Progressing  1/17/2024 1655 by Sabrina Ventura RN  Outcome: Progressing     Problem: ABCDS Injury Assessment  Goal: Absence of physical injury  1/18/2024 0533 by Nereida Edward RN  Outcome: Progressing  1/17/2024 1655 by Sabrina Ventura RN  Outcome: Progressing     Problem: Skin/Tissue Integrity  Goal: Absence of new skin breakdown  Description: 1.  Monitor for areas of redness and/or skin breakdown  2.  Assess vascular access sites hourly  3.  Every 4-6 hours minimum:  Change oxygen saturation probe site  4.  Every 4-6 hours:  If on nasal continuous positive airway pressure, respiratory therapy assess nares and determine need for appliance change or resting period.  1/18/2024 0533 by Nereida Edward RN  Outcome: Progressing  1/17/2024 1655 by Sabrina Ventura RN  Outcome: Progressing     Problem: Chronic Conditions and Co-morbidities  Goal: Patient's chronic conditions and co-morbidity symptoms are monitored and maintained or improved  1/18/2024 0533 by Nereida Edward RN  Outcome: Progressing  1/17/2024 1655 by Sabrina Ventura RN  Outcome: Progressing      Yes

## 2024-02-02 NOTE — PATIENT PROFILE ADULT - FALL HARM RISK - FALLEN IN PAST
No

## 2024-02-09 ENCOUNTER — APPOINTMENT (OUTPATIENT)
Dept: GASTROENTEROLOGY | Facility: CLINIC | Age: 39
End: 2024-02-09
Payer: MEDICARE

## 2024-02-09 DIAGNOSIS — Z09 ENCOUNTER FOR FOLLOW-UP EXAMINATION AFTER COMPLETED TREATMENT FOR CONDITIONS OTHER THAN MALIGNANT NEOPLASM: ICD-10-CM

## 2024-02-09 DIAGNOSIS — C50.919 MALIGNANT NEOPLASM OF UNSPECIFIED SITE OF UNSPECIFIED FEMALE BREAST: ICD-10-CM

## 2024-02-09 PROCEDURE — 99442: CPT

## 2024-02-09 NOTE — ASSESSMENT
[FreeTextEntry1] : I have recommended that we should keep a eye on her blood count and then follow-up after that.  We will schedule telephonic visit next week.  The patient will also have bone marrow biopsy results by that time.  I spent 15 minutes on the encounter Hermes Rosado MD Gastroenterology

## 2024-02-09 NOTE — HISTORY OF PRESENT ILLNESS
[FreeTextEntry1] : Telephonic visit was scheduled. Verbal consent was obtained from the patient.Patient was at home and I was at [address] [place] [address]., [facility].  37 y/o female with history of Stage-4 breast cancer admitted with recurrent symptomatic anemia.  She had been admitted multiple times.  She had been evaluated with EGD, colonoscopy, CT enterography, capsule endoscopy and then repeat EGD and colonoscopy at [place] on 16 January.  She underwent bone marrow.  For the past 1 week or so her blood count is stable.  She is denying any complaints currently.

## 2024-02-16 ENCOUNTER — APPOINTMENT (OUTPATIENT)
Dept: GASTROENTEROLOGY | Facility: CLINIC | Age: 39
End: 2024-02-16
Payer: MEDICARE

## 2024-02-16 DIAGNOSIS — K52.9 NONINFECTIVE GASTROENTERITIS AND COLITIS, UNSPECIFIED: ICD-10-CM

## 2024-02-16 PROCEDURE — 99443: CPT

## 2024-02-17 NOTE — HISTORY OF PRESENT ILLNESS
[FreeTextEntry1] : Telephonic visit was scheduled. Verbal consent was obtained from the patient.Patient was at home and I was at [address] [place] [address]., [facility].  37 y/o female with history of Stage-4 breast cancer admitted with recurrent symptomatic anemia.  She had been admitted multiple times.  She had been evaluated with EGD, colonoscopy, CT enterography, capsule endoscopy and then repeat EGD and colonoscopy at [place] on 16 January.  She underwent bone marrow.  For the past 1 week or so her blood count is stable.  She had normal bone marrow examination.  Her blood count is stable at 7.6.  She follows up with her oncologist on a regular basis.

## 2024-02-17 NOTE — ASSESSMENT
[FreeTextEntry1] : After extensive discussion with the patient and her oncologist Dr. Hare, I am going to repeat her CT enterography and then if that does not show anything, then I will just monitor blood count.  If she is not having any further drop in the blood count, she can follow-up for further chemotherapy initiation.  Otherwise I will start her on at least budesonide 9 mg on a daily basis and then assess the response and possibly repeat a capsule endoscopy.  Discussed at length with the patient and also with her oncologist.  I spent 22 minutes on the encounter   Hermes Rosado MD Gastroenterology

## 2024-02-22 ENCOUNTER — EMERGENCY (EMERGENCY)
Facility: HOSPITAL | Age: 39
LOS: 1 days | Discharge: HOME | End: 2024-02-22
Attending: EMERGENCY MEDICINE
Payer: MEDICARE

## 2024-02-22 VITALS
TEMPERATURE: 98 F | HEIGHT: 65 IN | WEIGHT: 119.05 LBS | DIASTOLIC BLOOD PRESSURE: 51 MMHG | SYSTOLIC BLOOD PRESSURE: 98 MMHG | HEART RATE: 99 BPM | OXYGEN SATURATION: 97 % | RESPIRATION RATE: 20 BRPM

## 2024-02-22 DIAGNOSIS — Z98.890 OTHER SPECIFIED POSTPROCEDURAL STATES: Chronic | ICD-10-CM

## 2024-02-22 DIAGNOSIS — Z90.89 ACQUIRED ABSENCE OF OTHER ORGANS: Chronic | ICD-10-CM

## 2024-02-22 LAB
ACANTHOCYTES BLD QL SMEAR: SLIGHT — SIGNIFICANT CHANGE UP
ALBUMIN SERPL ELPH-MCNC: 3.2 G/DL — LOW (ref 3.3–5.2)
ALP SERPL-CCNC: 173 U/L — HIGH (ref 40–120)
ALT FLD-CCNC: 23 U/L — SIGNIFICANT CHANGE UP
ANION GAP SERPL CALC-SCNC: 8 MMOL/L — SIGNIFICANT CHANGE UP (ref 5–17)
ANISOCYTOSIS BLD QL: SLIGHT — SIGNIFICANT CHANGE UP
APTT BLD: 41.4 SEC — HIGH (ref 24.5–35.6)
AST SERPL-CCNC: 40 U/L — HIGH
BASOPHILS # BLD AUTO: 0 K/UL — SIGNIFICANT CHANGE UP (ref 0–0.2)
BASOPHILS NFR BLD AUTO: 0 % — SIGNIFICANT CHANGE UP (ref 0–2)
BILIRUB SERPL-MCNC: 0.5 MG/DL — SIGNIFICANT CHANGE UP (ref 0.4–2)
BLD GP AB SCN SERPL QL: SIGNIFICANT CHANGE UP
BUN SERPL-MCNC: 15.5 MG/DL — SIGNIFICANT CHANGE UP (ref 8–20)
CALCIUM SERPL-MCNC: 8.1 MG/DL — LOW (ref 8.4–10.5)
CHLORIDE SERPL-SCNC: 107 MMOL/L — SIGNIFICANT CHANGE UP (ref 96–108)
CO2 SERPL-SCNC: 24 MMOL/L — SIGNIFICANT CHANGE UP (ref 22–29)
CREAT SERPL-MCNC: 0.42 MG/DL — LOW (ref 0.5–1.3)
EGFR: 129 ML/MIN/1.73M2 — SIGNIFICANT CHANGE UP
EOSINOPHIL # BLD AUTO: 0.03 K/UL — SIGNIFICANT CHANGE UP (ref 0–0.5)
EOSINOPHIL NFR BLD AUTO: 0.9 % — SIGNIFICANT CHANGE UP (ref 0–6)
GIANT PLATELETS BLD QL SMEAR: PRESENT — SIGNIFICANT CHANGE UP
GLUCOSE SERPL-MCNC: 100 MG/DL — HIGH (ref 70–99)
HCG SERPL-ACNC: <4 MIU/ML — SIGNIFICANT CHANGE UP
HCT VFR BLD CALC: 21.5 % — LOW (ref 34.5–45)
HGB BLD-MCNC: 6.6 G/DL — CRITICAL LOW (ref 11.5–15.5)
HYPOCHROMIA BLD QL: SLIGHT — SIGNIFICANT CHANGE UP
INR BLD: 1.05 RATIO — SIGNIFICANT CHANGE UP (ref 0.85–1.18)
LIDOCAIN IGE QN: 9 U/L — LOW (ref 22–51)
LYMPHOCYTES # BLD AUTO: 0.36 K/UL — LOW (ref 1–3.3)
LYMPHOCYTES # BLD AUTO: 11.3 % — LOW (ref 13–44)
MANUAL SMEAR VERIFICATION: SIGNIFICANT CHANGE UP
MCHC RBC-ENTMCNC: 27.4 PG — SIGNIFICANT CHANGE UP (ref 27–34)
MCHC RBC-ENTMCNC: 30.7 GM/DL — LOW (ref 32–36)
MCV RBC AUTO: 89.2 FL — SIGNIFICANT CHANGE UP (ref 80–100)
MONOCYTES # BLD AUTO: 0.11 K/UL — SIGNIFICANT CHANGE UP (ref 0–0.9)
MONOCYTES NFR BLD AUTO: 3.5 % — SIGNIFICANT CHANGE UP (ref 2–14)
NEUTROPHILS # BLD AUTO: 2.66 K/UL — SIGNIFICANT CHANGE UP (ref 1.8–7.4)
NEUTROPHILS NFR BLD AUTO: 84.3 % — HIGH (ref 43–77)
NRBC # BLD: 1 /100 WBCS — HIGH (ref 0–0)
PLAT MORPH BLD: NORMAL — SIGNIFICANT CHANGE UP
PLATELET # BLD AUTO: 207 K/UL — SIGNIFICANT CHANGE UP (ref 150–400)
POIKILOCYTOSIS BLD QL AUTO: SLIGHT — SIGNIFICANT CHANGE UP
POTASSIUM SERPL-MCNC: 4.3 MMOL/L — SIGNIFICANT CHANGE UP (ref 3.5–5.3)
POTASSIUM SERPL-SCNC: 4.3 MMOL/L — SIGNIFICANT CHANGE UP (ref 3.5–5.3)
PROT SERPL-MCNC: 5.4 G/DL — LOW (ref 6.6–8.7)
PROTHROM AB SERPL-ACNC: 11.6 SEC — SIGNIFICANT CHANGE UP (ref 9.5–13)
RBC # BLD: 2.41 M/UL — LOW (ref 3.8–5.2)
RBC # FLD: 20.4 % — HIGH (ref 10.3–14.5)
RBC BLD AUTO: ABNORMAL
SCHISTOCYTES BLD QL AUTO: SLIGHT — SIGNIFICANT CHANGE UP
SODIUM SERPL-SCNC: 139 MMOL/L — SIGNIFICANT CHANGE UP (ref 135–145)
TARGETS BLD QL SMEAR: SLIGHT — SIGNIFICANT CHANGE UP
WBC # BLD: 3.15 K/UL — LOW (ref 3.8–10.5)
WBC # FLD AUTO: 3.15 K/UL — LOW (ref 3.8–10.5)

## 2024-02-22 PROCEDURE — 99285 EMERGENCY DEPT VISIT HI MDM: CPT

## 2024-02-22 RX ORDER — HYDROMORPHONE HYDROCHLORIDE 2 MG/ML
1 INJECTION INTRAMUSCULAR; INTRAVENOUS; SUBCUTANEOUS
Refills: 0 | DISCHARGE

## 2024-02-22 RX ORDER — METHADONE HYDROCHLORIDE 40 MG/1
0 TABLET ORAL
Refills: 0 | DISCHARGE

## 2024-02-22 RX ORDER — HYDROMORPHONE HYDROCHLORIDE 2 MG/ML
2 INJECTION INTRAMUSCULAR; INTRAVENOUS; SUBCUTANEOUS
Refills: 0 | Status: DISCONTINUED | OUTPATIENT
Start: 2024-02-22 | End: 2024-02-23

## 2024-02-22 RX ORDER — SODIUM CHLORIDE 9 MG/ML
1000 INJECTION INTRAMUSCULAR; INTRAVENOUS; SUBCUTANEOUS ONCE
Refills: 0 | Status: COMPLETED | OUTPATIENT
Start: 2024-02-22 | End: 2024-02-22

## 2024-02-22 RX ORDER — METHYLPHENIDATE HCL 5 MG
0 TABLET ORAL
Refills: 0 | DISCHARGE

## 2024-02-22 RX ORDER — MEMANTINE HYDROCHLORIDE 10 MG/1
1 TABLET ORAL
Qty: 0 | Refills: 0 | DISCHARGE

## 2024-02-22 RX ORDER — ONDANSETRON 8 MG/1
1 TABLET, FILM COATED ORAL
Refills: 0 | DISCHARGE

## 2024-02-22 RX ORDER — PANTOPRAZOLE SODIUM 20 MG/1
1 TABLET, DELAYED RELEASE ORAL
Refills: 0 | DISCHARGE

## 2024-02-22 RX ADMIN — HYDROMORPHONE HYDROCHLORIDE 2 MILLIGRAM(S): 2 INJECTION INTRAMUSCULAR; INTRAVENOUS; SUBCUTANEOUS at 15:57

## 2024-02-22 RX ADMIN — HYDROMORPHONE HYDROCHLORIDE 2 MILLIGRAM(S): 2 INJECTION INTRAMUSCULAR; INTRAVENOUS; SUBCUTANEOUS at 15:27

## 2024-02-22 RX ADMIN — SODIUM CHLORIDE 1000 MILLILITER(S): 9 INJECTION INTRAMUSCULAR; INTRAVENOUS; SUBCUTANEOUS at 14:11

## 2024-02-22 RX ADMIN — HYDROMORPHONE HYDROCHLORIDE 2 MILLIGRAM(S): 2 INJECTION INTRAMUSCULAR; INTRAVENOUS; SUBCUTANEOUS at 19:52

## 2024-02-22 RX ADMIN — HYDROMORPHONE HYDROCHLORIDE 2 MILLIGRAM(S): 2 INJECTION INTRAMUSCULAR; INTRAVENOUS; SUBCUTANEOUS at 18:10

## 2024-02-22 RX ADMIN — HYDROMORPHONE HYDROCHLORIDE 2 MILLIGRAM(S): 2 INJECTION INTRAMUSCULAR; INTRAVENOUS; SUBCUTANEOUS at 14:11

## 2024-02-22 RX ADMIN — HYDROMORPHONE HYDROCHLORIDE 2 MILLIGRAM(S): 2 INJECTION INTRAMUSCULAR; INTRAVENOUS; SUBCUTANEOUS at 18:40

## 2024-02-22 RX ADMIN — HYDROMORPHONE HYDROCHLORIDE 2 MILLIGRAM(S): 2 INJECTION INTRAMUSCULAR; INTRAVENOUS; SUBCUTANEOUS at 21:05

## 2024-02-22 RX ADMIN — SODIUM CHLORIDE 1000 MILLILITER(S): 9 INJECTION INTRAMUSCULAR; INTRAVENOUS; SUBCUTANEOUS at 20:41

## 2024-02-22 RX ADMIN — HYDROMORPHONE HYDROCHLORIDE 2 MILLIGRAM(S): 2 INJECTION INTRAMUSCULAR; INTRAVENOUS; SUBCUTANEOUS at 14:41

## 2024-02-22 RX ADMIN — HYDROMORPHONE HYDROCHLORIDE 2 MILLIGRAM(S): 2 INJECTION INTRAMUSCULAR; INTRAVENOUS; SUBCUTANEOUS at 20:35

## 2024-02-22 RX ADMIN — HYDROMORPHONE HYDROCHLORIDE 2 MILLIGRAM(S): 2 INJECTION INTRAMUSCULAR; INTRAVENOUS; SUBCUTANEOUS at 17:31

## 2024-02-22 RX ADMIN — HYDROMORPHONE HYDROCHLORIDE 2 MILLIGRAM(S): 2 INJECTION INTRAMUSCULAR; INTRAVENOUS; SUBCUTANEOUS at 22:07

## 2024-02-22 RX ADMIN — HYDROMORPHONE HYDROCHLORIDE 2 MILLIGRAM(S): 2 INJECTION INTRAMUSCULAR; INTRAVENOUS; SUBCUTANEOUS at 19:22

## 2024-02-22 RX ADMIN — HYDROMORPHONE HYDROCHLORIDE 2 MILLIGRAM(S): 2 INJECTION INTRAMUSCULAR; INTRAVENOUS; SUBCUTANEOUS at 17:01

## 2024-02-22 RX ADMIN — HYDROMORPHONE HYDROCHLORIDE 2 MILLIGRAM(S): 2 INJECTION INTRAMUSCULAR; INTRAVENOUS; SUBCUTANEOUS at 23:38

## 2024-02-22 NOTE — ED PROVIDER NOTE - NSFOLLOWUPINSTRUCTIONS_ED_ALL_ED_FT
You are advised to please follow up with your primary care doctor within the next 24 hours and return to the Emergency Department for worsening symptoms or any other concerns.  Your doctor may call [phone] to follow up on the specific results of the tests performed today in the emergency department.    Anemia    Anemia is a condition in which the concentration of red blood cells or hemoglobin in the blood is below normal. Hemoglobin is a substance in red blood cells that carries oxygen to the tissues of the body. Anemia results in not enough oxygen reaching these tissues which can cause symptoms such as weakness, dizziness/lightheadedness, shortness of breath, chest pain, paleness, or nausea. The cause of your anemia may or may not be determined immediately. If your hemoglobin was dangerously low, you may have received a blood transfusion. Usually reactions to transfusions occur immediately but monitor yourself for any fevers, rash, or shortness of breath.    SEEK IMMEDIATE MEDICAL CARE IF YOU HAVE ANY OF THE FOLLOWING SYMPTOMS: extreme weakness/chest pain/shortness of breath, black or bloody stools, vomiting blood, fainting, fever, or any signs of dehydration.

## 2024-02-22 NOTE — PHARMACOTHERAPY INTERVENTION NOTE - COMMENTS
Spoke to patient at bedside to obtain medication list.  Spoke to patient at bedside to obtain medication list. Outpatient Medication Review updated.    HOME MEDICATIONS:  Ativan 0.5 mg oral tablet: 1 tab(s) orally 2 times a day as needed for  anxiety (2024 16:28)  Dilaudid 4 mg oral tablet: 1 tab(s) orally every 8 hours as needed for  severe pain (2024 16:28)  FLUoxetine 40 mg oral capsule: 2 cap(s) orally once a day (at bedtime) (2024 16:28)  Lyrica 150 mg oral capsule: 1 cap(s) orally 3 times a day hold if lethargic and do not take it with narcotics at the same time (2024 16:28)  methadone 10 mg oral tablet: 1 tab in the morning, 1 tablet in the afternoon, and 1.5 tabs (15 mG) at night (2024 16:36)  methylphenidate 20 mg oral tablet: take 20 mG in the morning and 10 mG in the evening (2024 16:36)  Multiple Vitamins oral tablet: 1 tab(s) orally once a day (2024 16:28)  Namenda 10 mg oral tablet: 1 tab(s) orally 2 times a day (2024 16:28)  pantoprazole 40 mg oral delayed release tablet: 1 tab(s) orally once a day (2024 16:36)  Zofran ODT 4 mg oral tablet, disintegratin tab(s) orally every 6 hours as needed for  nausea (2024 16:36)

## 2024-02-22 NOTE — ED PROVIDER NOTE - PATIENT PORTAL LINK FT
You can access the FollowMyHealth Patient Portal offered by [facility] by registering at the following website: [email or web address]. By joining [employer]’s FollowMyHealth portal, you will also be able to view your health information using other applications (apps) compatible with our system.

## 2024-02-22 NOTE — ED ADULT NURSE REASSESSMENT NOTE - NS ED NURSE REASSESS COMMENT FT1
Report received from off-going RN at 23:30, VSS, pt resting comfortably in stretcher. Respirations even and unlabored. Patient safety maintained.

## 2024-02-22 NOTE — ED ADULT NURSE REASSESSMENT NOTE - NS ED NURSE REASSESS COMMENT FT1
Pt awake and alert, AOX4, speaking coherently, respirations even and unlabored on RA, skin warm and dry. Pt denies HA, dizziness, lightheadedness, SOB, N/V/D, CP, palpitations.

## 2024-02-22 NOTE — ED ADULT NURSE REASSESSMENT NOTE - NS ED NURSE REASSESS COMMENT FT1
Pt resting comfortably, stated the medication has "taken the edge off", and patient was able to get some sleep. Pt awake and alert, AOx4, speaking coherently, respirations even and unlabored on RA, skin warm and dry.

## 2024-02-22 NOTE — ED PROVIDER NOTE - PHYSICAL EXAMINATION
General: NAD, pale appearing  Head: NC, AT  EENT: EOMI, no scleral icterus, dry mucous membranes  Cardiac: RRR, no apparent murmurs, no lower extremity edema  Respiratory: CTABL, no respiratory distress   Abdomen: soft, ND, LLQ/RLQ TTP, nonperitonitic  MSK/Vascular: full ROM, soft compartments, warm extremities, 2+ peripheral pulses b/l  Neuro: AAOx3, sensation to light touch intact  Psych: calm, cooperative

## 2024-02-22 NOTE — ED PROVIDER NOTE - CLINICAL SUMMARY MEDICAL DECISION MAKING FREE TEXT BOX
37 y/o female w/anemia requiring transfusions in the setting of GIB of unknown origin + metastatic breast CA. Outpatient workup to elucidate cause ongoing. Will check labs, T&S, EKG, give IVF, and transfuse to goal of 8.

## 2024-02-22 NOTE — ED ADULT NURSE REASSESSMENT NOTE - NS ED NURSE REASSESS COMMENT FT1
Fist unit PRBC completed, pt awake and alert, AOx4, speaking coherently, respirations even and unlabored on RA, skin warm and dry. Pt denies HA, dizziness, light handedness, SOB, N/V/D, CP, palpitations at this time.

## 2024-02-22 NOTE — ED PROVIDER NOTE - OBJECTIVE STATEMENT
38-year-old female with past medical history of metastatic breast cancer stage IV being followed by [facility] Dr. Chapa complicated by prior malignant  pericardial effusion, pleural effusion, GI bleed of unclear origin requiring multiple blood transfusions currently off chemo/radiation since the end of November presenting to the ER after outpatient CBC showed a hemoglobin of 6.4. Pt has been having intermittent lower abdominal pain (right and left) slightly worse over the last week. Intermittent black, tarry stools since November. Plan for CT enterography of her abdomen outpatient and f/u with GI (Dr. Rosado). She denies fevers, CP, diarrhea, urinary sx.

## 2024-02-22 NOTE — ED ADULT NURSE NOTE - OBJECTIVE STATEMENT
c/o intermittent abd pain rated 8/10 over past few weeks associated with SON x last few days, and hemoglobin 6.7 at outpt oncology appointment. PMH metastatic breast cancer stage IV. Pt AOx4, speaking coherently, respirations even and unlabored on RA, skin warm and dry.

## 2024-02-23 VITALS
TEMPERATURE: 98 F | OXYGEN SATURATION: 94 % | SYSTOLIC BLOOD PRESSURE: 100 MMHG | HEART RATE: 78 BPM | RESPIRATION RATE: 18 BRPM | DIASTOLIC BLOOD PRESSURE: 60 MMHG

## 2024-02-23 LAB
HCT VFR BLD CALC: 28.5 % — LOW (ref 34.5–45)
HGB BLD-MCNC: 9.1 G/DL — LOW (ref 11.5–15.5)
MCHC RBC-ENTMCNC: 28.2 PG — SIGNIFICANT CHANGE UP (ref 27–34)
MCHC RBC-ENTMCNC: 31.9 GM/DL — LOW (ref 32–36)
MCV RBC AUTO: 88.2 FL — SIGNIFICANT CHANGE UP (ref 80–100)
PLATELET # BLD AUTO: 164 K/UL — SIGNIFICANT CHANGE UP (ref 150–400)
RBC # BLD: 3.23 M/UL — LOW (ref 3.8–5.2)
RBC # FLD: 17.4 % — HIGH (ref 10.3–14.5)
WBC # BLD: 3.64 K/UL — LOW (ref 3.8–10.5)
WBC # FLD AUTO: 3.64 K/UL — LOW (ref 3.8–10.5)

## 2024-02-23 PROCEDURE — 96374 THER/PROPH/DIAG INJ IV PUSH: CPT

## 2024-02-23 PROCEDURE — 83690 ASSAY OF LIPASE: CPT

## 2024-02-23 PROCEDURE — 86901 BLOOD TYPING SEROLOGIC RH(D): CPT

## 2024-02-23 PROCEDURE — P9016: CPT

## 2024-02-23 PROCEDURE — 80053 COMPREHEN METABOLIC PANEL: CPT

## 2024-02-23 PROCEDURE — 85025 COMPLETE CBC W/AUTO DIFF WBC: CPT

## 2024-02-23 PROCEDURE — 86922 COMPATIBILITY TEST ANTIGLOB: CPT

## 2024-02-23 PROCEDURE — 85027 COMPLETE CBC AUTOMATED: CPT

## 2024-02-23 PROCEDURE — 99285 EMERGENCY DEPT VISIT HI MDM: CPT | Mod: 25

## 2024-02-23 PROCEDURE — 84702 CHORIONIC GONADOTROPIN TEST: CPT

## 2024-02-23 PROCEDURE — 96376 TX/PRO/DX INJ SAME DRUG ADON: CPT

## 2024-02-23 PROCEDURE — 85610 PROTHROMBIN TIME: CPT

## 2024-02-23 PROCEDURE — 36430 TRANSFUSION BLD/BLD COMPNT: CPT

## 2024-02-23 PROCEDURE — 36415 COLL VENOUS BLD VENIPUNCTURE: CPT

## 2024-02-23 PROCEDURE — 85730 THROMBOPLASTIN TIME PARTIAL: CPT

## 2024-02-23 PROCEDURE — 86900 BLOOD TYPING SEROLOGIC ABO: CPT

## 2024-02-23 PROCEDURE — 86850 RBC ANTIBODY SCREEN: CPT

## 2024-02-23 RX ADMIN — HYDROMORPHONE HYDROCHLORIDE 2 MILLIGRAM(S): 2 INJECTION INTRAMUSCULAR; INTRAVENOUS; SUBCUTANEOUS at 01:24

## 2024-02-23 NOTE — ED ADULT NURSE REASSESSMENT NOTE - NS ED NURSE REASSESS COMMENT FT1
Pt is resting in bed comfortably at this time, no apparent distress noted at this time. pt safety maintained. Pt denies any complaints at this time. pt aware of plan of care.

## 2024-02-28 ENCOUNTER — APPOINTMENT (OUTPATIENT)
Dept: CT IMAGING | Facility: CLINIC | Age: 39
End: 2024-02-28
Payer: MEDICARE

## 2024-02-28 ENCOUNTER — OUTPATIENT (OUTPATIENT)
Dept: OUTPATIENT SERVICES | Facility: HOSPITAL | Age: 39
LOS: 1 days | End: 2024-02-28
Payer: MEDICARE

## 2024-02-28 DIAGNOSIS — K52.9 NONINFECTIVE GASTROENTERITIS AND COLITIS, UNSPECIFIED: ICD-10-CM

## 2024-02-28 DIAGNOSIS — Z98.890 OTHER SPECIFIED POSTPROCEDURAL STATES: Chronic | ICD-10-CM

## 2024-02-28 DIAGNOSIS — Z90.89 ACQUIRED ABSENCE OF OTHER ORGANS: Chronic | ICD-10-CM

## 2024-02-28 PROCEDURE — 74177 CT ABD & PELVIS W/CONTRAST: CPT | Mod: 26

## 2024-02-28 PROCEDURE — 74177 CT ABD & PELVIS W/CONTRAST: CPT

## 2024-03-01 ENCOUNTER — TRANSCRIPTION ENCOUNTER (OUTPATIENT)
Age: 39
End: 2024-03-01

## 2024-03-01 NOTE — ED ADULT TRIAGE NOTE - WEIGHT METHOD
[FreeTextEntry1] : A/P:  *HTN *mild aortic dilation  -Will review echo images -start metoprolol 12.5 mg BID   Return in 6 weeks to reassess pressures.
stated

## 2024-03-15 ENCOUNTER — APPOINTMENT (OUTPATIENT)
Dept: GASTROENTEROLOGY | Facility: CLINIC | Age: 39
End: 2024-03-15
Payer: MEDICARE

## 2024-03-15 VITALS
SYSTOLIC BLOOD PRESSURE: 104 MMHG | HEART RATE: 74 BPM | WEIGHT: 133 LBS | RESPIRATION RATE: 16 BRPM | BODY MASS INDEX: 25.11 KG/M2 | HEIGHT: 61 IN | DIASTOLIC BLOOD PRESSURE: 70 MMHG | OXYGEN SATURATION: 97 %

## 2024-03-15 PROCEDURE — G2211 COMPLEX E/M VISIT ADD ON: CPT

## 2024-03-15 PROCEDURE — 99214 OFFICE O/P EST MOD 30 MIN: CPT

## 2024-03-15 NOTE — ASSESSMENT
[FreeTextEntry1] : NATIVIDAD MYERS is a 38 year old female, with a PMH of stage 4 metastatic breast cancer, who presents today for follow up visit for anemia. Prior work up including EGD, capsule endoscopy, colonoscopy and CTE have been negative. Will repeat CBC on Monday to evaluate anemia. If symptoms ongoing, will consider repeat endoscopy.

## 2024-03-15 NOTE — HISTORY OF PRESENT ILLNESS
[de-identified] : 12/11/23 - gastritis and nonspecific enteritis [FreeTextEntry1] : 11/2023 - internal hemorrhoids [de-identified] : CTE  2/28/24 - moderate colonic stool

## 2024-03-15 NOTE — PHYSICAL EXAM
[Alert] : alert [Normal Voice/Communication] : normal voice/communication [Healthy Appearing] : healthy appearing [Sclera] : the sclera and conjunctiva were normal [No Acute Distress] : no acute distress [Normal Lips/Gums] : the lips and gums were normal [Hearing Threshold Finger Rub Not [place]] : hearing was normal [Oropharynx] : the oropharynx was normal [Normal Appearance] : the appearance of the neck was normal [No Neck Mass] : no neck mass was observed [No Respiratory Distress] : no respiratory distress [Respiration, Rhythm And Depth] : normal respiratory rhythm and effort [No Acc Muscle Use] : no accessory muscle use [Auscultation Breath Sounds / Voice Sounds] : lungs were clear to auscultation bilaterally [Heart Rate And Rhythm] : heart rate was normal and rhythm regular [Normal S1, S2] : normal S1 and S2 [Murmurs] : no murmurs [Bowel Sounds] : normal bowel sounds [Abdomen Tenderness] : non-tender [Abdomen Soft] : soft [No Masses] : no abdominal mass palpated [] : no hepatosplenomegaly [Oriented To Time, Place, And Person] : oriented to person, place, and time

## 2024-03-15 NOTE — ADDENDUM
[FreeTextEntry1] : I, Mell Farris NP, acted as scribe for GABBY Turpin for this patient encounter.  I have personally seen and examined the patient. I agree with the history, physical examination, assessment and recommendations as noted above.  Gabyb Rosado MD Gastroenterology

## 2024-03-19 LAB
BASOPHILS # BLD AUTO: 0.01 K/UL
BASOPHILS NFR BLD AUTO: 0.3 %
EOSINOPHIL # BLD AUTO: 0.17 K/UL
EOSINOPHIL NFR BLD AUTO: 5.1 %
HCT VFR BLD CALC: 28.2 %
HGB BLD-MCNC: 8 G/DL
IMM GRANULOCYTES NFR BLD AUTO: 0.3 %
LYMPHOCYTES # BLD AUTO: 0.68 K/UL
LYMPHOCYTES NFR BLD AUTO: 20.2 %
MAN DIFF?: NORMAL
MCHC RBC-ENTMCNC: 28.4 GM/DL
MCHC RBC-ENTMCNC: 28.8 PG
MCV RBC AUTO: 101.4 FL
MONOCYTES # BLD AUTO: 0.4 K/UL
MONOCYTES NFR BLD AUTO: 11.9 %
NEUTROPHILS # BLD AUTO: 2.09 K/UL
NEUTROPHILS NFR BLD AUTO: 62.2 %
PLATELET # BLD AUTO: 185 K/UL
RBC # BLD: 2.78 M/UL
RBC # FLD: 19.2 %
WBC # FLD AUTO: 3.36 K/UL

## 2024-03-22 NOTE — CHART NOTE - NSCHARTNOTEFT_GEN_A_CORE
RN called to report pt recently admitted to floor,  admitted with anemia and medical hx of breast CA with mets    c/o generalized pain and pain in the abdomen  Pt seen at bedside alert and oriented states she has not been given any of her normal pain medication regimen,  One time order placed for Dilaudid 2mg and ZOFRAN for nausea  Attending notified Spontaneous, unlabored and symmetrical

## 2024-03-29 ENCOUNTER — APPOINTMENT (OUTPATIENT)
Dept: GASTROENTEROLOGY | Facility: HOSPITAL | Age: 39
End: 2024-03-29

## 2024-04-01 ENCOUNTER — EMERGENCY (EMERGENCY)
Facility: HOSPITAL | Age: 39
LOS: 1 days | Discharge: HOME | End: 2024-04-01
Attending: STUDENT IN AN ORGANIZED HEALTH CARE EDUCATION/TRAINING PROGRAM
Payer: MEDICARE

## 2024-04-01 VITALS
HEART RATE: 83 BPM | TEMPERATURE: 98 F | SYSTOLIC BLOOD PRESSURE: 112 MMHG | RESPIRATION RATE: 18 BRPM | OXYGEN SATURATION: 96 % | DIASTOLIC BLOOD PRESSURE: 70 MMHG

## 2024-04-01 VITALS
TEMPERATURE: 98 F | DIASTOLIC BLOOD PRESSURE: 72 MMHG | OXYGEN SATURATION: 98 % | HEART RATE: 76 BPM | SYSTOLIC BLOOD PRESSURE: 107 MMHG | HEIGHT: 65 IN | WEIGHT: 138.01 LBS | RESPIRATION RATE: 20 BRPM

## 2024-04-01 DIAGNOSIS — Z98.890 OTHER SPECIFIED POSTPROCEDURAL STATES: Chronic | ICD-10-CM

## 2024-04-01 DIAGNOSIS — Z90.89 ACQUIRED ABSENCE OF OTHER ORGANS: Chronic | ICD-10-CM

## 2024-04-01 LAB
ALBUMIN SERPL ELPH-MCNC: 3.3 G/DL — SIGNIFICANT CHANGE UP (ref 3.3–5.2)
ALP SERPL-CCNC: 134 U/L — HIGH (ref 40–120)
ALT FLD-CCNC: 18 U/L — SIGNIFICANT CHANGE UP
ANION GAP SERPL CALC-SCNC: 11 MMOL/L — SIGNIFICANT CHANGE UP (ref 5–17)
APPEARANCE UR: CLEAR — SIGNIFICANT CHANGE UP
AST SERPL-CCNC: 35 U/L — HIGH
BASOPHILS # BLD AUTO: 0.01 K/UL — SIGNIFICANT CHANGE UP (ref 0–0.2)
BASOPHILS NFR BLD AUTO: 0.2 % — SIGNIFICANT CHANGE UP (ref 0–2)
BILIRUB SERPL-MCNC: 0.9 MG/DL — SIGNIFICANT CHANGE UP (ref 0.4–2)
BILIRUB UR-MCNC: NEGATIVE — SIGNIFICANT CHANGE UP
BUN SERPL-MCNC: 15.5 MG/DL — SIGNIFICANT CHANGE UP (ref 8–20)
CALCIUM SERPL-MCNC: 9.4 MG/DL — SIGNIFICANT CHANGE UP (ref 8.4–10.5)
CHLORIDE SERPL-SCNC: 103 MMOL/L — SIGNIFICANT CHANGE UP (ref 96–108)
CO2 SERPL-SCNC: 27 MMOL/L — SIGNIFICANT CHANGE UP (ref 22–29)
COLOR SPEC: YELLOW — SIGNIFICANT CHANGE UP
CREAT SERPL-MCNC: 0.47 MG/DL — LOW (ref 0.5–1.3)
DIFF PNL FLD: NEGATIVE — SIGNIFICANT CHANGE UP
EGFR: 126 ML/MIN/1.73M2 — SIGNIFICANT CHANGE UP
EOSINOPHIL # BLD AUTO: 0.1 K/UL — SIGNIFICANT CHANGE UP (ref 0–0.5)
EOSINOPHIL NFR BLD AUTO: 2.3 % — SIGNIFICANT CHANGE UP (ref 0–6)
GLUCOSE SERPL-MCNC: 89 MG/DL — SIGNIFICANT CHANGE UP (ref 70–99)
GLUCOSE UR QL: NEGATIVE MG/DL — SIGNIFICANT CHANGE UP
HCG SERPL-ACNC: <4 MIU/ML — SIGNIFICANT CHANGE UP
HCT VFR BLD CALC: 28.5 % — LOW (ref 34.5–45)
HGB BLD-MCNC: 9.1 G/DL — LOW (ref 11.5–15.5)
IMM GRANULOCYTES NFR BLD AUTO: 0.2 % — SIGNIFICANT CHANGE UP (ref 0–0.9)
KETONES UR-MCNC: NEGATIVE MG/DL — SIGNIFICANT CHANGE UP
LACTATE BLDV-MCNC: 1.5 MMOL/L — SIGNIFICANT CHANGE UP (ref 0.5–2)
LEUKOCYTE ESTERASE UR-ACNC: NEGATIVE — SIGNIFICANT CHANGE UP
LIDOCAIN IGE QN: 16 U/L — LOW (ref 22–51)
LYMPHOCYTES # BLD AUTO: 0.56 K/UL — LOW (ref 1–3.3)
LYMPHOCYTES # BLD AUTO: 12.7 % — LOW (ref 13–44)
MAGNESIUM SERPL-MCNC: 1.4 MG/DL — LOW (ref 1.6–2.6)
MCHC RBC-ENTMCNC: 29 PG — SIGNIFICANT CHANGE UP (ref 27–34)
MCHC RBC-ENTMCNC: 31.9 GM/DL — LOW (ref 32–36)
MCV RBC AUTO: 90.8 FL — SIGNIFICANT CHANGE UP (ref 80–100)
MONOCYTES # BLD AUTO: 0.32 K/UL — SIGNIFICANT CHANGE UP (ref 0–0.9)
MONOCYTES NFR BLD AUTO: 7.3 % — SIGNIFICANT CHANGE UP (ref 2–14)
NEUTROPHILS # BLD AUTO: 3.4 K/UL — SIGNIFICANT CHANGE UP (ref 1.8–7.4)
NEUTROPHILS NFR BLD AUTO: 77.3 % — HIGH (ref 43–77)
NITRITE UR-MCNC: NEGATIVE — SIGNIFICANT CHANGE UP
PH UR: 7.5 — SIGNIFICANT CHANGE UP (ref 5–8)
PLATELET # BLD AUTO: 194 K/UL — SIGNIFICANT CHANGE UP (ref 150–400)
POTASSIUM SERPL-MCNC: 4.1 MMOL/L — SIGNIFICANT CHANGE UP (ref 3.5–5.3)
POTASSIUM SERPL-SCNC: 4.1 MMOL/L — SIGNIFICANT CHANGE UP (ref 3.5–5.3)
PROT SERPL-MCNC: 6.1 G/DL — LOW (ref 6.6–8.7)
PROT UR-MCNC: NEGATIVE MG/DL — SIGNIFICANT CHANGE UP
RBC # BLD: 3.14 M/UL — LOW (ref 3.8–5.2)
RBC # FLD: 17 % — HIGH (ref 10.3–14.5)
SODIUM SERPL-SCNC: 140 MMOL/L — SIGNIFICANT CHANGE UP (ref 135–145)
SP GR SPEC: >1.03 — HIGH (ref 1–1.03)
UROBILINOGEN FLD QL: 0.2 MG/DL — SIGNIFICANT CHANGE UP (ref 0.2–1)
WBC # BLD: 4.4 K/UL — SIGNIFICANT CHANGE UP (ref 3.8–10.5)
WBC # FLD AUTO: 4.4 K/UL — SIGNIFICANT CHANGE UP (ref 3.8–10.5)

## 2024-04-01 PROCEDURE — 36415 COLL VENOUS BLD VENIPUNCTURE: CPT

## 2024-04-01 PROCEDURE — 84702 CHORIONIC GONADOTROPIN TEST: CPT

## 2024-04-01 PROCEDURE — 96361 HYDRATE IV INFUSION ADD-ON: CPT

## 2024-04-01 PROCEDURE — 96376 TX/PRO/DX INJ SAME DRUG ADON: CPT

## 2024-04-01 PROCEDURE — 99284 EMERGENCY DEPT VISIT MOD MDM: CPT | Mod: 25

## 2024-04-01 PROCEDURE — 83605 ASSAY OF LACTIC ACID: CPT

## 2024-04-01 PROCEDURE — 87086 URINE CULTURE/COLONY COUNT: CPT

## 2024-04-01 PROCEDURE — 76705 ECHO EXAM OF ABDOMEN: CPT | Mod: 26

## 2024-04-01 PROCEDURE — 83735 ASSAY OF MAGNESIUM: CPT

## 2024-04-01 PROCEDURE — 99285 EMERGENCY DEPT VISIT HI MDM: CPT

## 2024-04-01 PROCEDURE — 83690 ASSAY OF LIPASE: CPT

## 2024-04-01 PROCEDURE — 96375 TX/PRO/DX INJ NEW DRUG ADDON: CPT

## 2024-04-01 PROCEDURE — 76705 ECHO EXAM OF ABDOMEN: CPT

## 2024-04-01 PROCEDURE — 74177 CT ABD & PELVIS W/CONTRAST: CPT | Mod: 26,MC

## 2024-04-01 PROCEDURE — 74177 CT ABD & PELVIS W/CONTRAST: CPT | Mod: MC

## 2024-04-01 PROCEDURE — 80053 COMPREHEN METABOLIC PANEL: CPT

## 2024-04-01 PROCEDURE — 96365 THER/PROPH/DIAG IV INF INIT: CPT | Mod: XU

## 2024-04-01 PROCEDURE — 81003 URINALYSIS AUTO W/O SCOPE: CPT

## 2024-04-01 PROCEDURE — 85025 COMPLETE CBC W/AUTO DIFF WBC: CPT

## 2024-04-01 RX ORDER — HYDROMORPHONE HYDROCHLORIDE 2 MG/ML
2 INJECTION INTRAMUSCULAR; INTRAVENOUS; SUBCUTANEOUS ONCE
Refills: 0 | Status: DISCONTINUED | OUTPATIENT
Start: 2024-04-01 | End: 2024-04-01

## 2024-04-01 RX ORDER — SODIUM CHLORIDE 9 MG/ML
1000 INJECTION, SOLUTION INTRAVENOUS ONCE
Refills: 0 | Status: COMPLETED | OUTPATIENT
Start: 2024-04-01 | End: 2024-04-01

## 2024-04-01 RX ORDER — MAGNESIUM SULFATE 500 MG/ML
2 VIAL (ML) INJECTION ONCE
Refills: 0 | Status: COMPLETED | OUTPATIENT
Start: 2024-04-01 | End: 2024-04-01

## 2024-04-01 RX ORDER — ONDANSETRON 8 MG/1
8 TABLET, FILM COATED ORAL ONCE
Refills: 0 | Status: COMPLETED | OUTPATIENT
Start: 2024-04-01 | End: 2024-04-01

## 2024-04-01 RX ADMIN — HYDROMORPHONE HYDROCHLORIDE 2 MILLIGRAM(S): 2 INJECTION INTRAMUSCULAR; INTRAVENOUS; SUBCUTANEOUS at 20:50

## 2024-04-01 RX ADMIN — HYDROMORPHONE HYDROCHLORIDE 2 MILLIGRAM(S): 2 INJECTION INTRAMUSCULAR; INTRAVENOUS; SUBCUTANEOUS at 19:49

## 2024-04-01 RX ADMIN — SODIUM CHLORIDE 1000 MILLILITER(S): 9 INJECTION, SOLUTION INTRAVENOUS at 19:50

## 2024-04-01 RX ADMIN — Medication 25 GRAM(S): at 21:10

## 2024-04-01 RX ADMIN — SODIUM CHLORIDE 1000 MILLILITER(S): 9 INJECTION, SOLUTION INTRAVENOUS at 22:40

## 2024-04-01 RX ADMIN — Medication 2 GRAM(S): at 22:40

## 2024-04-01 RX ADMIN — HYDROMORPHONE HYDROCHLORIDE 2 MILLIGRAM(S): 2 INJECTION INTRAMUSCULAR; INTRAVENOUS; SUBCUTANEOUS at 21:44

## 2024-04-01 RX ADMIN — ONDANSETRON 8 MILLIGRAM(S): 8 TABLET, FILM COATED ORAL at 19:48

## 2024-04-01 RX ADMIN — HYDROMORPHONE HYDROCHLORIDE 2 MILLIGRAM(S): 2 INJECTION INTRAMUSCULAR; INTRAVENOUS; SUBCUTANEOUS at 21:17

## 2024-04-01 NOTE — ED ADULT NURSE NOTE - OBJECTIVE STATEMENT
c/o of abd pain, Nausea and vomiting  since today. Power port accessed, Blood work done, US and CTA pending. Meds given as ordered. Will continue to monitor

## 2024-04-01 NOTE — ED PROVIDER NOTE - CARE PROVIDER_API CALL
Frederick Loo  Surgery  [address] [place] [address], [address]  [place] [address]  Phone: [phone]  Fax: [phone]  Follow Up Time:

## 2024-04-01 NOTE — ED PROVIDER NOTE - PROGRESS NOTE DETAILS
Clarence: Pt received in signout from Dr. Melendez. CT suggestive of pyelonephritis; however pt denying flank pain or urinary symptoms. +RUQ tenderness on my exam, no CVAT. Will check urine. [place]: Urine without signs of infection; pt with no clinical signs to suggest pyelonephritis so will defer treatment. I discussed with pt about the possibility of further testing to evaluate for possible acute cholecystitis. Pt feeling better at this time and wants to go home. Will give referral to surgeon. Pt counseled on low-fat diet and return precautions. Medically stable for discharge.

## 2024-04-01 NOTE — ED PROVIDER NOTE - PATIENT PORTAL LINK FT
You can access the FollowMyHealth Patient Portal offered by [facility] by registering at the following website: [email or web address]. By joining [employer]’s FollowMyHealth portal, you will also be able to view your health information using other applications (apps) compatible with our system.

## 2024-04-01 NOTE — ED PROVIDER NOTE - OBJECTIVE STATEMENT
38-year-old female past medical history of metastatic breast cancer not currently on chemotherapy, anemia, presents with vomiting.  Patient reports 1 day of multiple episodes of nonbloody vomiting.  She denies any diarrhea.  She reports associated right upper quadrant cramping abdominal pain is constant, no radiation, no alleviating exacerbating factors.  Patient reports that she has not passed flatus today.  No fever, chills, dysuria, hematuria, chest pain, shortness of breath.

## 2024-04-01 NOTE — ED PROVIDER NOTE - CLINICAL SUMMARY MEDICAL DECISION MAKING FREE TEXT BOX
undifferentiated abd pain we will eval with labs and CT. The fact that pt has not passed flatus raises clinical suspicion for SBO

## 2024-04-01 NOTE — ED PROVIDER NOTE - NSFOLLOWUPINSTRUCTIONS_ED_ALL_ED_FT
Cholelithiasis  Body outline showing the digestive system with a close-up of the gallbladder with gallstones in it.   Cholelithiasis is a disease in which gallstones form in the gallbladder. The gallbladder is an organ that stores bile. Bile is a fluid that helps to digest fats. Gallstones begin as small crystals and can slowly grow into stones. They may cause no symptoms until they block the gallbladder duct, or cystic duct, when the gallbladder tightens, or contracts, after food is eaten. This can cause pain and is known as a gallbladder attack, or biliary colic.    There are two main types of gallstones:  Cholesterol stones. These are the most common type of gallstone. These stones are made of hardened cholesterol and are usually yellow-green in color.  Pigment stones. These are dark in color and are made of a red-yellow substance, called bilirubin,that forms when hemoglobin from red blood cells breaks down.  What are the causes?  This condition may be caused by too little or too much of the substances that are in bile. This can happen if the bile:  Has too much bilirubin. This can happen in certain blood diseases, such as sickle cell anemia.  Has too much cholesterol.  Does not have enough bile salts. These salts help the body absorb and digest fats.  It can also happen if the gallbladder is not emptying completely. This is common during pregnancy.    What increases the risk?  The following factors may make you more likely to develop this condition:  Being older than 40 years of age.  Eating a diet that is heavy in fried foods, fat, and refined carbohydrates, such as white bread and white rice.  Being female.  Having multiple pregnancies.  Using medicines that contain female hormones (estrogen) for a long time.  Having certain medical problems, such as:  Diabetes mellitus.  Obesity.  Cystic fibrosis.  Crohn's disease.  Cirrhosis or other long-term (chronic) liver disease.  Certain blood diseases, such as sickle cell anemia or leukemia.  Having a family history of gallstones.  Losing weight quickly.  What are the signs or symptoms?  In many cases, having gallstones causes no symptoms. These are called silent gallstones. If a gallstone blocks your bile duct, it can cause a gallbladder attack. The main symptom of a gallbladder attack is sudden pain in the upper right part of the abdomen. The pain:  Usually comes at night or after eating.  Can last for one hour or more.  Can spread to your right shoulder, back, or chest.  Can feel like indigestion. This is discomfort, burning, or fullness in your upper abdomen.  If the bile duct is blocked for more than a few hours, it can cause an infection or inflammation of your gallbladder (cholecystitis), liver, or pancreas. This can cause:  Nausea or vomiting.  Bloating.  Pain in your abdomen that lasts for 5 hours or longer.  Tenderness in your upper abdomen, often in the upper right section and under your rib cage.  Fever or chills.  Skin or the white parts of your eyes turning yellow (jaundice). This usually happens when a stone has blocked bile from passing through the bile duct.  Dark pee (urine) or light-colored poop (stools).  How is this diagnosed?  This condition may be diagnosed based on:  A physical exam.  Your medical history.  Ultrasound.  CT scan.  MRI.  You may also have other tests, including:  Blood tests to check for infection or inflammation.  The HIDA scan to see the gallbladder and the bile ducts.  An endoscope to check for blockage in the bile ducts.  How is this treated?  Treatment depends on the severity of your symptoms. Silent gallstones do not need treatment. You may need treatment if a blockage causes a gallbladder attack or other symptoms. Treatment may include:  If symptoms are mild, you may care for yourself at home. For mild symptoms:  Stop eating and drinking for 12–24 hours. You may drink water and clear liquids. This helps to "cool down" your gallbladder. After 1 or 2 days, eat a diet of simple or clear foods, such as broths and crackers.  Take medicines for pain or nausea.  Take antibiotics if you have an infection.  If symptoms are severe, you may:  Stay in the hospital for pain control or to treat severe infection.  Have surgery to remove the gallbladder (cholecystectomy). This is the most common treatment if all other treatments have not worked.  Take medicines to break up gallstones. Medicines may be used for up to 6–12 months.  Have an procedure to capture and remove gallstones.  Follow these instructions at home:  Medicines    Take over-the-counter and prescription medicines only as told by your health care provider.  If you were prescribed antibiotics, take them as told by your provider. Do not stop using the antibiotic even if you start to feel better.  Ask your provider if the medicine prescribed to you requires you to avoid driving or using machinery.  Eating and drinking    Drink enough fluid to keep your pee pale yellow. This is important during a gallbladder attack. Water and clear liquids are preferred.  Follow a healthy diet. This includes:  Reducing fatty foods, such as fried food and foods high in cholesterol.  Reducing refined carbohydrates, such as white bread and white rice.  Eating more fiber. Aim for foods such as almonds, fruit, and beans.  General instructions    Do not use any products that contain nicotine or tobacco. These products include cigarettes, chewing tobacco, and vaping devices, such as e-cigarettes. If you need help quitting, ask your provider.  Maintain a healthy weight.  Keep all follow-up visits. These may include seeing a specialist or a surgeon.  Where to find more information  National [employer] of Diabetes and Digestive and Kidney Diseases: niddk.nih.gov  Contact a health care provider if:  You think you have had a gallbladder attack.  You have been diagnosed with silent gallstones and you develop indigestion or pain in your abdomen.  You have pain from a gallbladder attack that lasts for more than 2 hours.  You begin to have attacks more often.  You have nausea.  You have dark pee or light-colored poop.  Get help right away if:  You have pain in your abdomen that lasts for more than 5 hours or is getting worse.  You have a fever or chills.  You have vomiting that does not go away.  You develop jaundice.  This information is not intended to replace advice given to you by your health care provider. Make sure you discuss any questions you have with your health care provider.

## 2024-04-03 LAB
CULTURE RESULTS: NO GROWTH — SIGNIFICANT CHANGE UP
SPECIMEN SOURCE: SIGNIFICANT CHANGE UP

## 2024-04-08 ENCOUNTER — EMERGENCY (EMERGENCY)
Facility: HOSPITAL | Age: 39
LOS: 1 days | Discharge: HOME | End: 2024-04-08
Attending: STUDENT IN AN ORGANIZED HEALTH CARE EDUCATION/TRAINING PROGRAM
Payer: MEDICARE

## 2024-04-08 VITALS
RESPIRATION RATE: 20 BRPM | TEMPERATURE: 98 F | OXYGEN SATURATION: 98 % | HEART RATE: 69 BPM | DIASTOLIC BLOOD PRESSURE: 48 MMHG | SYSTOLIC BLOOD PRESSURE: 91 MMHG | WEIGHT: 141.1 LBS | HEIGHT: 65 IN

## 2024-04-08 DIAGNOSIS — Z98.890 OTHER SPECIFIED POSTPROCEDURAL STATES: Chronic | ICD-10-CM

## 2024-04-08 DIAGNOSIS — Z90.89 ACQUIRED ABSENCE OF OTHER ORGANS: Chronic | ICD-10-CM

## 2024-04-08 LAB
ALBUMIN SERPL ELPH-MCNC: 3 G/DL — LOW (ref 3.3–5.2)
ALP SERPL-CCNC: 189 U/L — HIGH (ref 40–120)
ALT FLD-CCNC: 23 U/L — SIGNIFICANT CHANGE UP
ANION GAP SERPL CALC-SCNC: 10 MMOL/L — SIGNIFICANT CHANGE UP (ref 5–17)
ANISOCYTOSIS BLD QL: SLIGHT — SIGNIFICANT CHANGE UP
AST SERPL-CCNC: 48 U/L — HIGH
BASOPHILS # BLD AUTO: 0 K/UL — SIGNIFICANT CHANGE UP (ref 0–0.2)
BASOPHILS NFR BLD AUTO: 0 % — SIGNIFICANT CHANGE UP (ref 0–2)
BILIRUB SERPL-MCNC: 0.4 MG/DL — SIGNIFICANT CHANGE UP (ref 0.4–2)
BLD GP AB SCN SERPL QL: SIGNIFICANT CHANGE UP
BUN SERPL-MCNC: 14.5 MG/DL — SIGNIFICANT CHANGE UP (ref 8–20)
CALCIUM SERPL-MCNC: 8.7 MG/DL — SIGNIFICANT CHANGE UP (ref 8.4–10.5)
CHLORIDE SERPL-SCNC: 107 MMOL/L — SIGNIFICANT CHANGE UP (ref 96–108)
CO2 SERPL-SCNC: 26 MMOL/L — SIGNIFICANT CHANGE UP (ref 22–29)
CREAT SERPL-MCNC: 0.48 MG/DL — LOW (ref 0.5–1.3)
EGFR: 125 ML/MIN/1.73M2 — SIGNIFICANT CHANGE UP
EOSINOPHIL # BLD AUTO: 0.04 K/UL — SIGNIFICANT CHANGE UP (ref 0–0.5)
EOSINOPHIL NFR BLD AUTO: 1.7 % — SIGNIFICANT CHANGE UP (ref 0–6)
GIANT PLATELETS BLD QL SMEAR: PRESENT — SIGNIFICANT CHANGE UP
GLUCOSE SERPL-MCNC: 161 MG/DL — HIGH (ref 70–99)
HCT VFR BLD CALC: 19.4 % — CRITICAL LOW (ref 34.5–45)
HGB BLD-MCNC: 6.1 G/DL — CRITICAL LOW (ref 11.5–15.5)
LYMPHOCYTES # BLD AUTO: 0.39 K/UL — LOW (ref 1–3.3)
LYMPHOCYTES # BLD AUTO: 14.9 % — SIGNIFICANT CHANGE UP (ref 13–44)
MACROCYTES BLD QL: SLIGHT — SIGNIFICANT CHANGE UP
MANUAL SMEAR VERIFICATION: SIGNIFICANT CHANGE UP
MCHC RBC-ENTMCNC: 28.6 PG — SIGNIFICANT CHANGE UP (ref 27–34)
MCHC RBC-ENTMCNC: 31.4 GM/DL — LOW (ref 32–36)
MCV RBC AUTO: 91.1 FL — SIGNIFICANT CHANGE UP (ref 80–100)
MONOCYTES # BLD AUTO: 0.05 K/UL — SIGNIFICANT CHANGE UP (ref 0–0.9)
MONOCYTES NFR BLD AUTO: 1.8 % — LOW (ref 2–14)
NEUTROPHILS # BLD AUTO: 2.12 K/UL — SIGNIFICANT CHANGE UP (ref 1.8–7.4)
NEUTROPHILS NFR BLD AUTO: 81.6 % — HIGH (ref 43–77)
PLAT MORPH BLD: NORMAL — SIGNIFICANT CHANGE UP
PLATELET # BLD AUTO: 161 K/UL — SIGNIFICANT CHANGE UP (ref 150–400)
POIKILOCYTOSIS BLD QL AUTO: SLIGHT — SIGNIFICANT CHANGE UP
POLYCHROMASIA BLD QL SMEAR: SLIGHT — SIGNIFICANT CHANGE UP
POTASSIUM SERPL-MCNC: 4.3 MMOL/L — SIGNIFICANT CHANGE UP (ref 3.5–5.3)
POTASSIUM SERPL-SCNC: 4.3 MMOL/L — SIGNIFICANT CHANGE UP (ref 3.5–5.3)
PROT SERPL-MCNC: 5.3 G/DL — LOW (ref 6.6–8.7)
RBC # BLD: 2.13 M/UL — LOW (ref 3.8–5.2)
RBC # FLD: 17.7 % — HIGH (ref 10.3–14.5)
RBC BLD AUTO: ABNORMAL
SODIUM SERPL-SCNC: 143 MMOL/L — SIGNIFICANT CHANGE UP (ref 135–145)
WBC # BLD: 2.6 K/UL — LOW (ref 3.8–10.5)
WBC # FLD AUTO: 2.6 K/UL — LOW (ref 3.8–10.5)

## 2024-04-08 PROCEDURE — 99285 EMERGENCY DEPT VISIT HI MDM: CPT

## 2024-04-08 RX ORDER — HYDROMORPHONE HYDROCHLORIDE 2 MG/ML
1 INJECTION INTRAMUSCULAR; INTRAVENOUS; SUBCUTANEOUS ONCE
Refills: 0 | Status: DISCONTINUED | OUTPATIENT
Start: 2024-04-08 | End: 2024-04-08

## 2024-04-08 RX ORDER — HYDROMORPHONE HYDROCHLORIDE 2 MG/ML
0.5 INJECTION INTRAMUSCULAR; INTRAVENOUS; SUBCUTANEOUS ONCE
Refills: 0 | Status: DISCONTINUED | OUTPATIENT
Start: 2024-04-08 | End: 2024-04-08

## 2024-04-08 RX ORDER — HYDROMORPHONE HYDROCHLORIDE 2 MG/ML
2 INJECTION INTRAMUSCULAR; INTRAVENOUS; SUBCUTANEOUS ONCE
Refills: 0 | Status: DISCONTINUED | OUTPATIENT
Start: 2024-04-08 | End: 2024-04-08

## 2024-04-08 RX ORDER — HYDROMORPHONE HYDROCHLORIDE 2 MG/ML
4 INJECTION INTRAMUSCULAR; INTRAVENOUS; SUBCUTANEOUS ONCE
Refills: 0 | Status: DISCONTINUED | OUTPATIENT
Start: 2024-04-08 | End: 2024-04-08

## 2024-04-08 RX ADMIN — HYDROMORPHONE HYDROCHLORIDE 4 MILLIGRAM(S): 2 INJECTION INTRAMUSCULAR; INTRAVENOUS; SUBCUTANEOUS at 23:48

## 2024-04-08 RX ADMIN — HYDROMORPHONE HYDROCHLORIDE 2 MILLIGRAM(S): 2 INJECTION INTRAMUSCULAR; INTRAVENOUS; SUBCUTANEOUS at 17:56

## 2024-04-08 RX ADMIN — HYDROMORPHONE HYDROCHLORIDE 0.5 MILLIGRAM(S): 2 INJECTION INTRAMUSCULAR; INTRAVENOUS; SUBCUTANEOUS at 21:07

## 2024-04-08 RX ADMIN — HYDROMORPHONE HYDROCHLORIDE 1 MILLIGRAM(S): 2 INJECTION INTRAMUSCULAR; INTRAVENOUS; SUBCUTANEOUS at 19:07

## 2024-04-08 RX ADMIN — HYDROMORPHONE HYDROCHLORIDE 0.5 MILLIGRAM(S): 2 INJECTION INTRAMUSCULAR; INTRAVENOUS; SUBCUTANEOUS at 20:45

## 2024-04-08 RX ADMIN — HYDROMORPHONE HYDROCHLORIDE 0.5 MILLIGRAM(S): 2 INJECTION INTRAMUSCULAR; INTRAVENOUS; SUBCUTANEOUS at 23:47

## 2024-04-08 RX ADMIN — HYDROMORPHONE HYDROCHLORIDE 2 MILLIGRAM(S): 2 INJECTION INTRAMUSCULAR; INTRAVENOUS; SUBCUTANEOUS at 18:35

## 2024-04-08 NOTE — ED PROVIDER NOTE - ATTENDING CONTRIBUTION TO CARE
I, Lilian Capps, performed the initial face to face bedside interview with this patient regarding history of present illness, review of symptoms and relevant past medical, social and family history.  I completed an independent physical examination.  I was the initial provider who evaluated this patient. I have signed out the follow up of any pending tests (i.e. labs, radiological studies) to the resident.  I have communicated the patient’s plan of care and disposition with the resident.

## 2024-04-08 NOTE — ED ADULT TRIAGE NOTE - DIRECT TO ROOM CARE INITIATED:
Subjective   Patient ID: Raquel Gaitan a 72 year old female.    Chief Complaint   Patient presents with   • Other     Cut L arm w/ nail     PCP: Dr. Verduzco     Was working in her garden yesterday and suffer injury with nail to left mid arm   No fever, chills, wound drainage   Cleaned skin abrasion with peroxide last night   No tingling or numbness   Patient is not diabetic   Not sure about last  tetanus immunization   No CP, SOB, palpitations         Raquel García does not have any pertinent problems on file.  Raquel  has no past surgical history on file.  Her family history includes Patient is unaware of any medical problems in her father and mother.  Raquel García has a current medication list which includes the following prescription(s): cephalexin, clobetasol, azelastine, and olopatadine.  Raquel   Current Outpatient Medications   Medication Sig Dispense Refill   • cephalexin (KEFLEX) 500 MG capsule Take 1 capsule by mouth 2 times daily for 7 days. 14 capsule 0   • clobetasol (TEMOVATE) 0.05 % ointment Apply topically 2 times daily. 30 g 0   • azelastine (ASTELIN) 0.1 % nasal spray INSERT 2 SQUIRTS IN EACH NOSTRIL TWICE DAILY     • olopatadine (PATANOL) 0.1 % ophthalmic solution INSTILL 1-2 DROPS TWO TIMES A DAY AS NEEDED       No current facility-administered medications for this visit.      Raquel has No Known Allergies.    Review of Systems   Constitutional: Negative for activity change, appetite change, chills, diaphoresis, fatigue, fever and unexpected weight change.   HENT: Negative for congestion, dental problem, drooling, ear discharge, ear pain, facial swelling, hearing loss, mouth sores, nosebleeds, postnasal drip, rhinorrhea, sinus pressure, sinus pain, sneezing, sore throat, tinnitus, trouble swallowing and voice change.    Eyes: Negative for photophobia, pain, discharge, redness, itching and visual disturbance.   Respiratory: Negative for apnea, cough, choking, chest  tightness, shortness of breath, wheezing and stridor.    Cardiovascular: Negative for chest pain, palpitations and leg swelling.   Gastrointestinal: Negative for abdominal distention, abdominal pain, anal bleeding, blood in stool, constipation, diarrhea, nausea, rectal pain and vomiting.   Endocrine: Negative for cold intolerance, heat intolerance, polydipsia, polyphagia and polyuria.   Genitourinary: Negative for decreased urine volume, difficulty urinating, dysuria, enuresis, flank pain, frequency, genital sores, hematuria and urgency.   Musculoskeletal: Negative for arthralgias, back pain, gait problem, joint swelling, myalgias, neck pain and neck stiffness.   Skin: Positive for wound. Negative for color change, pallor and rash.   Allergic/Immunologic: Negative for environmental allergies, food allergies and immunocompromised state.   Neurological: Negative for dizziness, tremors, seizures, syncope, facial asymmetry, speech difficulty, weakness, light-headedness, numbness and headaches.   Hematological: Negative for adenopathy. Does not bruise/bleed easily.   Psychiatric/Behavioral: Negative for agitation, behavioral problems, confusion, decreased concentration, dysphoric mood, hallucinations, self-injury, sleep disturbance and suicidal ideas. The patient is not nervous/anxious and is not hyperactive.        Objective   Visit Vitals  /80   Pulse 77   Temp 98.3 °F (36.8 °C) (Oral)   Resp 20   Ht 5' 4\" (1.626 m)   Wt 80.6 kg (177 lb 11.1 oz)   LMP  (LMP Unknown)   BMI 30.50 kg/m²     Physical Exam   Constitutional: She is oriented to person, place, and time. She appears well-developed and well-nourished.   HENT:   Head: Normocephalic.   Right Ear: External ear normal.   Left Ear: External ear normal.   Nose: Nose normal.   Mouth/Throat: Oropharynx is clear and moist.   Eyes: Pupils are equal, round, and reactive to light. Conjunctivae and EOM are normal. Right eye exhibits no discharge. No scleral icterus.    Neck: Normal range of motion. Neck supple. No JVD present. No thyromegaly present.   Cardiovascular: Normal rate, regular rhythm, normal heart sounds and intact distal pulses. Exam reveals no gallop and no friction rub.   No murmur heard.  Pulmonary/Chest: Effort normal and breath sounds normal. No stridor. No respiratory distress. She has no wheezes. She has no rales. She exhibits no tenderness.   Abdominal: Soft. Bowel sounds are normal. She exhibits no distension and no mass. There is no tenderness. There is no rebound and no guarding.   Musculoskeletal: Normal range of motion. She exhibits no edema, tenderness or deformity.   Lymphadenopathy:     She has no cervical adenopathy.   Neurological: She is alert and oriented to person, place, and time. She has normal reflexes. No cranial nerve deficit. She exhibits normal muscle tone. Coordination normal.   Skin: Skin is warm and dry. Lesion noted. No rash noted. There is erythema. No pallor.        4 cm , indurated skin abrasion with ecchymosis around the area - no drainage noted     Psychiatric: She has a normal mood and affect. Her behavior is normal. Judgment and thought content normal.   Nursing note and vitals reviewed.        Assessment   Problem List Items Addressed This Visit        Musculoskeletal    Nail, injury by       Integumentary    Skin abrasion - Primary    Left arm abrasion due to nail injury in the garden 5/14/2019   -Use prescribed medication as directed   -Clean with warm water and soap   - Keep affected skin dry   - Do not touch or scratch affected area   - Wear loose fitting cotton clothing   - Good hand washing is one of the best ways to control the spread of germs   - Call office if  not improving or fever, increased redness, red streaks, pain worsens or wound drainage - patient verbalized understanding       Relevant Medications    cephalexin (KEFLEX) 500 MG capsule    Other Relevant Orders    TETANUS DIPHTHERIA ACELLULAR PERTUSSIS VACC,  11+ YRS (ADACEL) (Completed)       Other    Need for Tdap vaccination    Relevant Orders    TETANUS DIPHTHERIA ACELLULAR PERTUSSIS VACC, 11+ YRS (ADACEL) (Completed)- given in office w/o complications       Medical compliance with plan discussed and risks of non-compliance reviewed . Patient education completed on disease process, etiology and prognosis. Patient expresses understanding of the plan . Proper usage and side effects of medication reviewed and discussed. Medical compliance with plan discussed and risks of non-compliance reviewed . Patient education completed on disease process, etiology and prognosis. Patient expresses understanding of the plan .             Rosa Mercer, CNP   08-Apr-2024 16:02

## 2024-04-08 NOTE — ED PROVIDER NOTE - CLINICAL SUMMARY MEDICAL DECISION MAKING FREE TEXT BOX
38-year-old female with metastatic breast cancer presents for hemoglobin of 6 from her oncologist office.  Her last transfusion was 2 weeks ago.  She takes Dilaudid at home for chronic pain, will give her IV Dilaudid.  Will access port, check labs, plan to transfuse her as needed. 38-year-old female with metastatic breast cancer presents for hemoglobin of 6 from her oncologist office.  Her last transfusion was 2 weeks ago.  She takes Dilaudid at home for chronic pain, will give her IV Dilaudid.  Will access port, check labs, plan to transfuse her as needed.    Estinfa: Patient received in sign-out from Dr. Capps pending 2 unit [facility] transfusion and likely d/c home. Patient re-assessed. BP is noted to be low but at per patient it is normal for her typically 's. Patient ambulatory without difficulty. Awake, alert, oriented, and no obvious neurologic/ cardiovascular distress. Repeat BP 98/46. Will continue to monitor. 38-year-old female with metastatic breast cancer presents for hemoglobin of 6 from her oncologist office.  Her last transfusion was 2 weeks ago.  She takes Dilaudid at home for chronic pain, will give her IV Dilaudid.  Will access port, check labs, plan to transfuse her as needed.    Estinfa: Patient received in sign-out from Dr. Capps pending 2 unit [facility] transfusion and likely d/c home. Patient re-assessed. BP is noted to be low but at per patient it is normal for her typically 's. Patient ambulatory without difficulty. Awake, alert, oriented, and no obvious neurologic/ cardiovascular distress. Repeat BP 98/46. Will continue to monitor.    Patient has been stable. BP is as noted but near her baseline patient is no symptomatic due to it. She will f/u with her team and [facility] for continued management of her recurrent symptomatic anemia were they plan on regular iron effusions. Patient endorses despite through work-up it is still unclear to the recurrent episodes of anemia.

## 2024-04-08 NOTE — ED ADULT NURSE NOTE - ALCOHOL PRE SCREEN (AUDIT - C)
Refill request received for    pantoprazole (PROTONIX) 40 MG tablet 90 tablet 0 4/13/2022     Sig - Route: TAKE 1 TABLET BY MOUTH DAILY - Oral    Sent to pharmacy as: Pantoprazole Sodium 40 MG Oral Tablet Delayed Release (PROTONIX)    Class: Eprescribe          The pt was last seen: 6/17/21  There is a future appointment scheduled for: 8/25/21  Refill by protocol: yes  
Statement Selected

## 2024-04-08 NOTE — ED ADULT NURSE NOTE - OBJECTIVE STATEMENT
Pt is a 38YOF who is here for a low hemoglobin, pt states that she was at her oncologist today and had complaints of lower back pain/ache, shortness of breath, difficulty breathing, they ellie her blood work in the office and told her to come to the Emergency Department for a blood transfusion, pt denies any chest pain, nausea, vomiting, fevers, chills, sick contacts. Pt is A&O4.

## 2024-04-08 NOTE — ED PROVIDER NOTE - PHYSICAL EXAMINATION
Const: Awake, alert and oriented. In no acute distress.   Chronically ill-appearing.  HEENT: NC/AT.   Pale mucosa.  Eyes: No scleral icterus. EOMI.  Neck:. Soft and supple. Full ROM without pain.  Cardiac: Regular rate and regular rhythm. +S1/S2. No murmurs. Peripheral pulses 2+ and symmetric. No LE edema.  Resp: Speaking in full sentences. No evidence of respiratory distress. No wheezes, rales or rhonchi.  Abd: Soft, moderately diffusely tender, mild guarding in the lower abdomen, non-distended. Normal bowel sounds in all 4 quadrants. No guarding or rebound.  Skin: No rashes, abrasions or lacerations.  Neuro: Awake, alert & oriented x 3. Moves all extremities symmetrically.

## 2024-04-08 NOTE — ED PROVIDER NOTE - WR INTERPRETATION 1
CXR -port o cath in placed in right chest, left upper lobe infiltrate unchanged from 3/27/24, No pneumothorax,  opacities, No free air

## 2024-04-08 NOTE — ED PROVIDER NOTE - NSFOLLOWUPINSTRUCTIONS_ED_ALL_ED_FT
1) Follow up with your doctor in 1-2 days  2) Return to the ER for worsening or concerning symptoms    Anemia       Anemia is a condition in which there is not enough red blood cells or hemoglobin in the blood. Hemoglobin is a substance in red blood cells that carries oxygen.    When you do not have enough red blood cells or hemoglobin (are anemic), your body cannot get enough oxygen and your organs may not work properly. As a result, you may feel very tired or have other problems.    What are the causes?  Common causes of anemia include:    Excessive bleeding. Anemia can be caused by excessive bleeding inside or outside the body, including bleeding from the intestines or from heavy menstrual periods in females.  Poor nutrition.  Long-lasting (chronic) kidney, thyroid, and liver disease.  Bone marrow disorders, spleen problems, and blood disorders.  Cancer and treatments for cancer.  HIV (human immunodeficiency virus) and AIDS (acquired immunodeficiency syndrome).  Infections, medicines, and autoimmune disorders that destroy red blood cells.    What are the signs or symptoms?  Symptoms of this condition include:    Minor weakness.  Dizziness.  Headache, or difficulties concentrating and sleeping.  Heartbeats that feel irregular or faster than normal (palpitations).  Shortness of breath, especially with exercise.  Pale skin, lips, and nails, or cold hands and feet.  Indigestion and nausea.    Symptoms may occur suddenly or develop slowly. If your anemia is mild, you may not have symptoms.    How is this diagnosed?  This condition is diagnosed based on blood tests, your medical history, and a physical exam. In some cases, a test may be needed in which cells are removed from the soft tissue inside of a bone and looked at under a microscope (bone marrow biopsy). Your health care provider may also check your stool (feces) for blood and may do additional testing to look for the cause of your bleeding.    Other tests may include:    Imaging tests, such as a CT scan or MRI.  A procedure to see inside your esophagus and stomach (endoscopy).  A procedure to see inside your colon and rectum (colonoscopy).    How is this treated?  Treatment for this condition depends on the cause. If you continue to lose a lot of blood, you may need to be treated at a hospital. Treatment may include:    Taking supplements of iron, vitamin B12, or folic acid.  Taking a hormone medicine (erythropoietin) that can help to stimulate red blood cell growth.  Having a blood transfusion. This may be needed if you lose a lot of blood.  Making changes to your diet.  Having surgery to remove your spleen.    Follow these instructions at home:  Take over-the-counter and prescription medicines only as told by your health care provider.  Take supplements only as told by your health care provider.  Follow any diet instructions that you were given by your health care provider.  Keep all follow-up visits as told by your health care provider. This is important.    Contact a health care provider if:  You develop new bleeding anywhere in the body.    Get help right away if:  You are very weak.  You are short of breath.  You have pain in your abdomen or chest.  You are dizzy or feel faint.  You have trouble concentrating.  You have bloody stools, black stools, or tarry stools.  You vomit repeatedly or you vomit up blood.    These symptoms may represent a serious problem that is an emergency. Do not wait to see if the symptoms will go away. Get medical help right away. Call your local emergency services (911 in the [place] Do not drive yourself to the hospital.    Summary  Anemia is a condition in which you do not have enough red blood cells or enough of a substance in your red blood cells that carries oxygen (hemoglobin).  Symptoms may occur suddenly or develop slowly.  If your anemia is mild, you may not have symptoms.  This condition is diagnosed with blood tests, a medical history, and a physical exam. Other tests may be needed.  Treatment for this condition depends on the cause of the anemia.    ADDITIONAL NOTES AND INSTRUCTIONS    Please follow up with your Primary MD in 24-48 hr.  Seek immediate medical care for any new/worsening signs or symptoms.     Document Released: 1/25/2006 Document Revised: 11/24/2020 Document Reviewed: 11/24/2020  [employer] Interactive Patient Education ©2019 [employer] This information is not intended to replace advice given to you by your health care provider. Make sure you discuss any questions you have with your health care provider.    Blood Transfusion, Adult    A blood transfusion is a procedure in which you receive blood or a type of blood cell (blood component) through an IV. You may need a blood transfusion when your blood level is low. This may result from a bleeding disorder, illness, injury, or surgery. The blood may come from a donor. You may also be able to donate blood for yourself (autologous blood donation) before a planned surgery.    The blood given in a transfusion is made up of different blood components. You may receive:    Red blood cells. These carry oxygen to the cells in the body.  Platelets. These help your blood to clot.  Plasma. This is the liquid part of your blood. It carries proteins and other substances throughout the body.  White blood cells. These help you fight infections.    If you have hemophilia or another clotting disorder, you may also receive other types of blood products.    Tell a health care provider about:  Any blood disorders you have.  Any previous reactions you have had during a blood transfusion.  Any allergies you have.  All medicines you are taking, including vitamins, herbs, eye drops, creams, and over-the-counter medicines.  Any surgeries you have had.  Any medical conditions you have, including any recent fever or cold symptoms.  Whether you are pregnant or may be pregnant.    What are the risks?  Generally, this is a safe procedure. However, problems may occur.    The most common problems include:    A mild allergic reaction, such as red, swollen areas of skin (hives) and itching.  Fever or chills. This may be the body's response to new blood cells received. This may occur during or up to 4 hours after the transfusion.  More serious problems may include:    Transfusion-associated circulatory overload (TACO), or too much fluid in the lungs. This may cause breathing problems.  A serious allergic reaction, such as difficulty breathing or swelling around the face and lips.  Transfusion-related acute lung injury (TRALI), which causes breathing difficulty and low oxygen in the blood. This can occur within hours of the transfusion or several days later.  Iron overload. This can happen after receiving many blood transfusions over a period of time.  Infection or virus being transmitted. This is rare because donated blood is carefully tested before it is given.  Hemolytic transfusion reaction. This is rare. It happens when your body's defense system (immune system)tries to attack the new blood cells. Symptoms may include fever, chills, nausea, low blood pressure, and low back or chest pain.  Transfusion-associated lqzhm-nquucc-nvxw disease (TAGVHD). This is rare. It happens when donated cells attack your body's healthy tissues.    What happens before the procedure?      Medicines    Ask your health care provider about:    Changing or stopping your regular medicines. This is especially important if you are taking diabetes medicines or blood thinners.  Taking medicines such as aspirin and ibuprofen. These medicines can thin your blood. Do not take these medicines unless your health care provider tells you to take them.  Taking over-the-counter medicines, vitamins, herbs, and supplements.        General instructions    Follow instructions from your health care provider about eating and drinking restrictions.  You will have a blood test to determine your blood type. This is necessary to know what kind of blood your body will accept and to match it to the donor blood.  If you are going to have a planned surgery, you may be able to do an autologous blood donation. This may be done in case you need to have a transfusion.  You will have your temperature, blood pressure, and pulse monitored before the transfusion.  If you have had an allergic reaction to a transfusion in the past, you may be given medicine to help prevent a reaction. This medicine may be given to you by mouth (orally) or through an IV.  Set aside time for the blood transfusion. This procedure generally takes 1–4 hours to complete.    What happens during the procedure?     An IV will be inserted into one of your veins.  The bag of donated blood will be attached to your IV. The blood will then enter through your vein.  Your temperature, blood pressure, and pulse will be monitored regularly during the transfusion. This monitoring is done to detect early signs of a transfusion reaction.  Tell your nurse right away if you have any of these symptoms during the transfusion:    Shortness of breath or trouble breathing.  Chest or back pain.  Fever or chills.  Hives or itching.  If you have any signs or symptoms of a reaction, your transfusion will be stopped and you may be given medicine.  When the transfusion is complete, your IV will be removed.  Pressure may be applied to the IV site for a few minutes.  A bandage (dressing)will be applied.    The procedure may vary among health care providers and hospitals.    What happens after the procedure?  Your temperature, blood pressure, pulse, breathing rate, and blood oxygen level will be monitored until you leave the hospital or clinic.  Your blood may be tested to see how you are responding to the transfusion.  You may be warmed with fluids or blankets to maintain a normal body temperature.  If you receive your blood transfusion in an outpatient setting, you will be told whom to contact to report any reactions.    Where to find more information  For more information on blood transfusions, visit the [employer] [place]: [facility]    Summary  A blood transfusion is a procedure in which you receive blood or a type of blood cell (blood component) through an IV.  The blood you receive may come from a donor or be donated by yourself (autologous blood donation) before a planned surgery.  The blood given in a transfusion is made up of different blood components. You may receive red blood cells, platelets, plasma, or white blood cells depending on the condition treated.  Your temperature, blood pressure, and pulse will be monitored before, during, and after the transfusion.  After the transfusion, your blood may be tested to see how your body has responded.    ADDITIONAL NOTES AND INSTRUCTIONS    Please follow up with your Primary MD in 24-48 hr.  Seek immediate medical care for any new/worsening signs or symptoms.     Document Released: 12/15/2001 Document Revised: 10/22/2020 Document Reviewed: 6/11/2020  [employer] Interactive Patient Education ©2019 [employer] This information is not intended to replace advice given to you by your health care provider. Make sure you discuss any questions you have with your health care provider.

## 2024-04-08 NOTE — ED PROVIDER NOTE - NS ED ATTENDING STATEMENT MOD
Due to recent measles outbreak, PMD and the MD recommend that patient receive the MMR vaccine.    Spoke with patient mother who stated that they were not interested in receiving the vaccine and she then hung up the phone.  
Attending with

## 2024-04-08 NOTE — ED PROVIDER NOTE - NS ED ROS FT
Const:  + Fatigue. Denies fever, chills  HEENT: Denies blurry vision, sore throat  Neck: Denies neck pain/stiffness  Resp:  + Shortness of breath. Denies coughing  Cardiovascular: Denies CP, palpitations, LE edema  GI:  + Abdominal pain. Denies nausea, vomiting, diarrhea, constipation, blood in stool  : Denies urinary frequency/urgency/dysuria, hematuria  MSK:  + back pain  Neuro: + Headache. Denies dizziness, numbness, weakness  Skin: Denies rashes.

## 2024-04-08 NOTE — ED PROVIDER NOTE - COVID-19 RESULT
-- DO NOT REPLY / DO NOT REPLY ALL --  -- Message is from the [facility]--    COVID-19 Universal Screening: N/A - Not about scheduling    General Patient Message      Reason for Call: pt is calling because she would like details on when she might receive her invite for the vaccine please contact with details.    Caller Information       Type Contact Phone    01/27/2021 11:35 AM CST Phone (Incoming) Rose Kumar (Self) [phone] (H)          Alternative phone number: none    Turnaround time given to caller:   \"This message will be sent to [state Provider's name]. The clinical team will fulfill your request as soon as they review your message.\"    
Spoke with patient and informed her that she would have to call the covid vaccine hotline and she said she has called but no answer. She would have to wait until we have more information  
NEGATIVE

## 2024-04-08 NOTE — ED PROVIDER NOTE - PROGRESS NOTE DETAILS
Lilian Capps DO: Patient consented for blood. Eating pizza, asking for more pain medication. Another 2 mg of dilaudid ordered. 2 U pRBC ordered. Patient stable. Pending completion of PRBC.

## 2024-04-08 NOTE — ED PROVIDER NOTE - OBJECTIVE STATEMENT
38-year-old female with metastatic breast cancer, pleural effusion, pericardial effusions, presents from her oncologist office for hemoglobin of 6.  She states her last transfusion was about 2 weeks ago.  Her last PET scan showed stable disease.  She notes her chronic lower abdominal pain, chronic back pain associated with compression fractures that are pathologic in nature.  She is chronically on Dilaudid and methadone for pain.  She states she feels fatigued, short of breath as she usually does when she requires a transfusion.

## 2024-04-08 NOTE — ED PROVIDER NOTE - EKG/XRAY ADDITIONAL INFORMATION
Patient with stable SINTIA opacity since 10/2023, port a cath in place no pneumothorax, of plueral effusion/edema  EKG: SB@56.

## 2024-04-08 NOTE — ED PROVIDER NOTE - PATIENT PORTAL LINK FT
You can access the FollowMyHealth Patient Portal offered by [facility] by registering at the following website: [email or web address]. By joining [employer]’s FollowMyHealth portal, you will also be able to view your health information using other applications (apps) compatible with our system.

## 2024-04-09 VITALS
TEMPERATURE: 98 F | RESPIRATION RATE: 16 BRPM | OXYGEN SATURATION: 100 % | SYSTOLIC BLOOD PRESSURE: 93 MMHG | DIASTOLIC BLOOD PRESSURE: 73 MMHG | HEART RATE: 59 BPM

## 2024-04-09 PROCEDURE — 71046 X-RAY EXAM CHEST 2 VIEWS: CPT

## 2024-04-09 PROCEDURE — 96376 TX/PRO/DX INJ SAME DRUG ADON: CPT

## 2024-04-09 PROCEDURE — 86901 BLOOD TYPING SEROLOGIC RH(D): CPT

## 2024-04-09 PROCEDURE — P9016: CPT

## 2024-04-09 PROCEDURE — 86850 RBC ANTIBODY SCREEN: CPT

## 2024-04-09 PROCEDURE — 99285 EMERGENCY DEPT VISIT HI MDM: CPT | Mod: 25

## 2024-04-09 PROCEDURE — 93010 ELECTROCARDIOGRAM REPORT: CPT

## 2024-04-09 PROCEDURE — 86900 BLOOD TYPING SEROLOGIC ABO: CPT

## 2024-04-09 PROCEDURE — 96374 THER/PROPH/DIAG INJ IV PUSH: CPT

## 2024-04-09 PROCEDURE — 85025 COMPLETE CBC W/AUTO DIFF WBC: CPT

## 2024-04-09 PROCEDURE — 71046 X-RAY EXAM CHEST 2 VIEWS: CPT | Mod: 26

## 2024-04-09 PROCEDURE — 80053 COMPREHEN METABOLIC PANEL: CPT

## 2024-04-09 PROCEDURE — 93005 ELECTROCARDIOGRAM TRACING: CPT

## 2024-04-09 PROCEDURE — 86922 COMPATIBILITY TEST ANTIGLOB: CPT

## 2024-04-09 PROCEDURE — 36415 COLL VENOUS BLD VENIPUNCTURE: CPT

## 2024-04-09 PROCEDURE — 36430 TRANSFUSION BLD/BLD COMPNT: CPT

## 2024-04-09 RX ORDER — HYDROMORPHONE HYDROCHLORIDE 2 MG/ML
0.5 INJECTION INTRAMUSCULAR; INTRAVENOUS; SUBCUTANEOUS ONCE
Refills: 0 | Status: DISCONTINUED | OUTPATIENT
Start: 2024-04-09 | End: 2024-04-09

## 2024-04-09 RX ADMIN — Medication 300 UNIT(S): at 05:17

## 2024-04-09 RX ADMIN — HYDROMORPHONE HYDROCHLORIDE 0.5 MILLIGRAM(S): 2 INJECTION INTRAMUSCULAR; INTRAVENOUS; SUBCUTANEOUS at 04:01

## 2024-04-09 NOTE — ED ADULT NURSE REASSESSMENT NOTE - NS ED NURSE REASSESS COMMENT FT1
pt is a&ox3 resting comfortably in bed, in NAD. pt completed 1st unit of PRBCs with no complaints/incident. 2nd unit of PRBCs started.  pt educated on s/s of blood transfusion reaction and verbalizes understanding. pt mildly hypotensive, MD Lara aware. pt reports partial relief of generalized chronic pain from pain meds given as per provider orders. pt remains on CM and , normal sinus on monitor. pt educated on poc. pt safety maintained

## 2024-04-17 ENCOUNTER — APPOINTMENT (OUTPATIENT)
Dept: GASTROENTEROLOGY | Facility: CLINIC | Age: 39
End: 2024-04-17
Payer: MEDICARE

## 2024-04-17 DIAGNOSIS — D64.9 ANEMIA, UNSPECIFIED: ICD-10-CM

## 2024-04-17 PROCEDURE — 99442: CPT

## 2024-04-17 NOTE — HISTORY OF PRESENT ILLNESS
[FreeTextEntry1] : Telephonic visit was scheduled. Verbal consent was obtained from the patient.Patient was at home and I was at [address] [place] [address]., [facility].  37 y/o female with history of Stage-4 breast cancer admitted with recurrent symptomatic anemia.  She had been admitted multiple times.  She had been evaluated with EGD, colonoscopy, CT enterography, capsule endoscopy and then repeat EGD and colonoscopy at [place] on 16 January.  She underwent bone marrow.  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  She has been recently started on somatostatin.  She has been also been given IV iron on a weekly basis.  Her last blood count is 10.2.  She is interested in having another EGD.

## 2024-04-17 NOTE — ASSESSMENT
[FreeTextEntry1] : At this time we will be recommending a EGD at [facility]. Risks (including bleeding, pain, perforation, incomplete examination, adverse reactions to medications, aspiration and death), benefits and alternatives were discussed. Patient is agreeable for the EGD. The patient is medically optimized for the procedure. We will schedule the patient for the procedure.  She will continue to follow-up with Dr. Hare.   I spent 15 minutes on the encounter Hermes Rosado MD Gastroenterology

## 2024-04-28 ENCOUNTER — TRANSCRIPTION ENCOUNTER (OUTPATIENT)
Age: 39
End: 2024-04-28

## 2024-04-29 ENCOUNTER — APPOINTMENT (OUTPATIENT)
Dept: GASTROENTEROLOGY | Facility: HOSPITAL | Age: 39
End: 2024-04-29

## 2024-04-29 ENCOUNTER — OUTPATIENT (OUTPATIENT)
Dept: OUTPATIENT SERVICES | Facility: HOSPITAL | Age: 39
LOS: 1 days | End: 2024-04-29
Payer: MEDICARE

## 2024-04-29 DIAGNOSIS — Z98.890 OTHER SPECIFIED POSTPROCEDURAL STATES: Chronic | ICD-10-CM

## 2024-04-29 DIAGNOSIS — Z90.89 ACQUIRED ABSENCE OF OTHER ORGANS: Chronic | ICD-10-CM

## 2024-04-29 DIAGNOSIS — K52.9 NONINFECTIVE GASTROENTERITIS AND COLITIS, UNSPECIFIED: ICD-10-CM

## 2024-04-29 DIAGNOSIS — R93.89 ABNORMAL FINDINGS ON DIAGNOSTIC IMAGING OF OTHER SPECIFIED BODY STRUCTURES: ICD-10-CM

## 2024-04-29 DIAGNOSIS — D64.9 ANEMIA, UNSPECIFIED: ICD-10-CM

## 2024-04-29 LAB
ANISOCYTOSIS BLD QL: SIGNIFICANT CHANGE UP
BASOPHILS # BLD AUTO: 0 K/UL — SIGNIFICANT CHANGE UP (ref 0–0.2)
BASOPHILS NFR BLD AUTO: 0 % — SIGNIFICANT CHANGE UP (ref 0–2)
BURR CELLS BLD QL SMEAR: PRESENT — SIGNIFICANT CHANGE UP
DACRYOCYTES BLD QL SMEAR: SLIGHT — SIGNIFICANT CHANGE UP
ELLIPTOCYTES BLD QL SMEAR: SLIGHT — SIGNIFICANT CHANGE UP
EOSINOPHIL # BLD AUTO: 0.09 K/UL — SIGNIFICANT CHANGE UP (ref 0–0.5)
EOSINOPHIL NFR BLD AUTO: 4.4 % — SIGNIFICANT CHANGE UP (ref 0–6)
HCT VFR BLD CALC: 30.5 % — LOW (ref 34.5–45)
HGB BLD-MCNC: 9.1 G/DL — LOW (ref 11.5–15.5)
HYPOCHROMIA BLD QL: SLIGHT — SIGNIFICANT CHANGE UP
LYMPHOCYTES # BLD AUTO: 0.51 K/UL — LOW (ref 1–3.3)
LYMPHOCYTES # BLD AUTO: 25.7 % — SIGNIFICANT CHANGE UP (ref 13–44)
MACROCYTES BLD QL: SIGNIFICANT CHANGE UP
MANUAL SMEAR VERIFICATION: SIGNIFICANT CHANGE UP
MCHC RBC-ENTMCNC: 29 PG — SIGNIFICANT CHANGE UP (ref 27–34)
MCHC RBC-ENTMCNC: 29.8 GM/DL — LOW (ref 32–36)
MCV RBC AUTO: 97.1 FL — SIGNIFICANT CHANGE UP (ref 80–100)
METAMYELOCYTES # FLD: 0.9 % — HIGH (ref 0–0)
MONOCYTES # BLD AUTO: 0.12 K/UL — SIGNIFICANT CHANGE UP (ref 0–0.9)
MONOCYTES NFR BLD AUTO: 6.2 % — SIGNIFICANT CHANGE UP (ref 2–14)
NEUTROPHILS # BLD AUTO: 1.26 K/UL — LOW (ref 1.8–7.4)
NEUTROPHILS NFR BLD AUTO: 62.8 % — SIGNIFICANT CHANGE UP (ref 43–77)
OVALOCYTES BLD QL SMEAR: SLIGHT — SIGNIFICANT CHANGE UP
PLAT MORPH BLD: NORMAL — SIGNIFICANT CHANGE UP
PLATELET # BLD AUTO: 98 K/UL — LOW (ref 150–400)
POIKILOCYTOSIS BLD QL AUTO: SLIGHT — SIGNIFICANT CHANGE UP
POLYCHROMASIA BLD QL SMEAR: SLIGHT — SIGNIFICANT CHANGE UP
RBC # BLD: 3.14 M/UL — LOW (ref 3.8–5.2)
RBC # FLD: 19.6 % — HIGH (ref 10.3–14.5)
RBC BLD AUTO: ABNORMAL
SCHISTOCYTES BLD QL AUTO: SLIGHT — SIGNIFICANT CHANGE UP
SMUDGE CELLS # BLD: PRESENT — SIGNIFICANT CHANGE UP
WBC # BLD: 2 K/UL — LOW (ref 3.8–10.5)
WBC # FLD AUTO: 2 K/UL — LOW (ref 3.8–10.5)

## 2024-04-29 PROCEDURE — 43270 EGD LESION ABLATION: CPT

## 2024-04-29 PROCEDURE — 43247 EGD REMOVE FOREIGN BODY: CPT

## 2024-04-29 PROCEDURE — 85025 COMPLETE CBC W/AUTO DIFF WBC: CPT

## 2024-04-29 PROCEDURE — 36415 COLL VENOUS BLD VENIPUNCTURE: CPT

## 2024-04-29 PROCEDURE — 43255 EGD CONTROL BLEEDING ANY: CPT | Mod: 59

## 2024-04-29 NOTE — HISTORY OF PRESENT ILLNESS
[FreeTextEntry1] : 39 y/o female with history of Stage-4 breast cancer admitted with recurrent symptomatic anemia.  She had been admitted multiple times.  She had been evaluated with EGD, colonoscopy, CT enterography, capsule endoscopy and then repeat EGD and colonoscopy at [place] on 16 January.  She underwent bone marrow.  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  She has been recently started on somatostatin.  She has been also been given IV iron on a weekly basis.  Her last blood count is 10.2.  She is interested in having another EGD.

## 2024-04-29 NOTE — PHYSICAL EXAM
[Alert] : alert [Normal Voice/Communication] : normal voice/communication [Healthy Appearing] : healthy appearing [No Acute Distress] : no acute distress [Sclera] : the sclera and conjunctiva were normal [Hearing Threshold Finger Rub Not Garza] : hearing was normal [Normal Lips/Gums] : the lips and gums were normal [Oropharynx] : the oropharynx was normal [Normal Appearance] : the appearance of the neck was normal [No Neck Mass] : no neck mass was observed [No Respiratory Distress] : no respiratory distress [No Acc Muscle Use] : no accessory muscle use [Respiration, Rhythm And Depth] : normal respiratory rhythm and effort [Auscultation Breath Sounds / Voice Sounds] : lungs were clear to auscultation bilaterally [Normal S1, S2] : normal S1 and S2 [Heart Rate And Rhythm] : heart rate was normal and rhythm regular [Murmurs] : no murmurs [Bowel Sounds] : normal bowel sounds [Abdomen Tenderness] : non-tender [No Masses] : no abdominal mass palpated [Abdomen Soft] : soft [] : no hepatosplenomegaly [Oriented To Time, Place, And Person] : oriented to person, place, and time

## 2024-06-13 ENCOUNTER — EMERGENCY (EMERGENCY)
Facility: HOSPITAL | Age: 39
LOS: 1 days | Discharge: HOME | End: 2024-06-13
Attending: EMERGENCY MEDICINE
Payer: MEDICARE

## 2024-06-13 VITALS
HEART RATE: 83 BPM | TEMPERATURE: 98 F | HEIGHT: 62 IN | OXYGEN SATURATION: 100 % | DIASTOLIC BLOOD PRESSURE: 59 MMHG | SYSTOLIC BLOOD PRESSURE: 101 MMHG | RESPIRATION RATE: 18 BRPM | WEIGHT: 138.89 LBS

## 2024-06-13 DIAGNOSIS — Z90.89 ACQUIRED ABSENCE OF OTHER ORGANS: Chronic | ICD-10-CM

## 2024-06-13 DIAGNOSIS — Z98.890 OTHER SPECIFIED POSTPROCEDURAL STATES: Chronic | ICD-10-CM

## 2024-06-13 LAB
ALBUMIN SERPL ELPH-MCNC: 2.8 G/DL — LOW (ref 3.3–5.2)
ALP SERPL-CCNC: 180 U/L — HIGH (ref 40–120)
ALT FLD-CCNC: 28 U/L — SIGNIFICANT CHANGE UP
ANION GAP SERPL CALC-SCNC: 9 MMOL/L — SIGNIFICANT CHANGE UP (ref 5–17)
ANISOCYTOSIS BLD QL: SIGNIFICANT CHANGE UP
APTT BLD: 34.7 SEC — SIGNIFICANT CHANGE UP (ref 24.5–35.6)
AST SERPL-CCNC: 45 U/L — HIGH
BASOPHILS # BLD AUTO: 0 K/UL — SIGNIFICANT CHANGE UP (ref 0–0.2)
BASOPHILS NFR BLD AUTO: 0 % — SIGNIFICANT CHANGE UP (ref 0–2)
BILIRUB SERPL-MCNC: 0.5 MG/DL — SIGNIFICANT CHANGE UP (ref 0.4–2)
BLD GP AB SCN SERPL QL: SIGNIFICANT CHANGE UP
BUN SERPL-MCNC: 7.2 MG/DL — LOW (ref 8–20)
CALCIUM SERPL-MCNC: 6.9 MG/DL — LOW (ref 8.4–10.5)
CHLORIDE SERPL-SCNC: 111 MMOL/L — HIGH (ref 96–108)
CO2 SERPL-SCNC: 23 MMOL/L — SIGNIFICANT CHANGE UP (ref 22–29)
CREAT SERPL-MCNC: 0.48 MG/DL — LOW (ref 0.5–1.3)
DACRYOCYTES BLD QL SMEAR: SIGNIFICANT CHANGE UP
EGFR: 125 ML/MIN/1.73M2 — SIGNIFICANT CHANGE UP
EOSINOPHIL # BLD AUTO: 0 K/UL — SIGNIFICANT CHANGE UP (ref 0–0.5)
EOSINOPHIL NFR BLD AUTO: 0 % — SIGNIFICANT CHANGE UP (ref 0–6)
GIANT PLATELETS BLD QL SMEAR: PRESENT — SIGNIFICANT CHANGE UP
GLUCOSE SERPL-MCNC: 104 MG/DL — HIGH (ref 70–99)
HCG SERPL-ACNC: <4 MIU/ML — SIGNIFICANT CHANGE UP
HCT VFR BLD CALC: 20.3 % — CRITICAL LOW (ref 34.5–45)
HGB BLD-MCNC: 6.3 G/DL — CRITICAL LOW (ref 11.5–15.5)
INR BLD: 1.08 RATIO — SIGNIFICANT CHANGE UP (ref 0.85–1.18)
LIDOCAIN IGE QN: 12 U/L — LOW (ref 22–51)
LYMPHOCYTES # BLD AUTO: 0.73 K/UL — LOW (ref 1–3.3)
LYMPHOCYTES # BLD AUTO: 28.1 % — SIGNIFICANT CHANGE UP (ref 13–44)
MACROCYTES BLD QL: SIGNIFICANT CHANGE UP
MANUAL SMEAR VERIFICATION: SIGNIFICANT CHANGE UP
MCHC RBC-ENTMCNC: 31 GM/DL — LOW (ref 32–36)
MCHC RBC-ENTMCNC: 31.7 PG — SIGNIFICANT CHANGE UP (ref 27–34)
MCV RBC AUTO: 102 FL — HIGH (ref 80–100)
MONOCYTES # BLD AUTO: 0.25 K/UL — SIGNIFICANT CHANGE UP (ref 0–0.9)
MONOCYTES NFR BLD AUTO: 9.6 % — SIGNIFICANT CHANGE UP (ref 2–14)
NEUTROPHILS # BLD AUTO: 1.61 K/UL — LOW (ref 1.8–7.4)
NEUTROPHILS NFR BLD AUTO: 62.3 % — SIGNIFICANT CHANGE UP (ref 43–77)
NRBC # BLD: 1 /100 WBCS — HIGH (ref 0–0)
PLAT MORPH BLD: NORMAL — SIGNIFICANT CHANGE UP
PLATELET # BLD AUTO: 192 K/UL — SIGNIFICANT CHANGE UP (ref 150–400)
POIKILOCYTOSIS BLD QL AUTO: SIGNIFICANT CHANGE UP
POLYCHROMASIA BLD QL SMEAR: SLIGHT — SIGNIFICANT CHANGE UP
POTASSIUM SERPL-MCNC: 3.7 MMOL/L — SIGNIFICANT CHANGE UP (ref 3.5–5.3)
POTASSIUM SERPL-SCNC: 3.7 MMOL/L — SIGNIFICANT CHANGE UP (ref 3.5–5.3)
PROT SERPL-MCNC: 4.9 G/DL — LOW (ref 6.6–8.7)
PROTHROM AB SERPL-ACNC: 12 SEC — SIGNIFICANT CHANGE UP (ref 9.5–13)
RBC # BLD: 1.99 M/UL — LOW (ref 3.8–5.2)
RBC # FLD: 21.6 % — HIGH (ref 10.3–14.5)
RBC BLD AUTO: ABNORMAL
SODIUM SERPL-SCNC: 143 MMOL/L — SIGNIFICANT CHANGE UP (ref 135–145)
WBC # BLD: 2.59 K/UL — LOW (ref 3.8–10.5)
WBC # FLD AUTO: 2.59 K/UL — LOW (ref 3.8–10.5)

## 2024-06-13 PROCEDURE — 71046 X-RAY EXAM CHEST 2 VIEWS: CPT | Mod: 26

## 2024-06-13 PROCEDURE — 99285 EMERGENCY DEPT VISIT HI MDM: CPT

## 2024-06-13 RX ORDER — HYDROMORPHONE HYDROCHLORIDE 2 MG/ML
1 INJECTION INTRAMUSCULAR; INTRAVENOUS; SUBCUTANEOUS ONCE
Refills: 0 | Status: DISCONTINUED | OUTPATIENT
Start: 2024-06-13 | End: 2024-06-13

## 2024-06-13 RX ORDER — ACETAMINOPHEN 500 MG
1000 TABLET ORAL ONCE
Refills: 0 | Status: COMPLETED | OUTPATIENT
Start: 2024-06-13 | End: 2024-06-13

## 2024-06-13 RX ORDER — SODIUM CHLORIDE 9 MG/ML
1000 INJECTION, SOLUTION INTRAVENOUS ONCE
Refills: 0 | Status: COMPLETED | OUTPATIENT
Start: 2024-06-13 | End: 2024-06-13

## 2024-06-13 RX ADMIN — Medication 400 MILLIGRAM(S): at 18:17

## 2024-06-13 RX ADMIN — HYDROMORPHONE HYDROCHLORIDE 1 MILLIGRAM(S): 2 INJECTION INTRAMUSCULAR; INTRAVENOUS; SUBCUTANEOUS at 22:37

## 2024-06-13 RX ADMIN — SODIUM CHLORIDE 1000 MILLILITER(S): 9 INJECTION, SOLUTION INTRAVENOUS at 22:38
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
## 2024-06-13 NOTE — ED ADULT TRIAGE NOTE - CHIEF COMPLAINT QUOTE
pt sent for low HGB 6.8, then rechecked 6.5,   A&Ox3, resp wnl, sent by oncologist, c/o abdominal pain & fatigue

## 2024-06-13 NOTE — ED ADULT NURSE NOTE - NSFALLRISKINTERV_ED_ALL_ED

## 2024-06-13 NOTE — ED ADULT NURSE REASSESSMENT NOTE - NS ED NURSE REASSESS COMMENT FT1
assumed care of pt from VADIM White at 1915. pt resting in bed in NAD, resps even and unlabored. pt a&ox4, c/o generalized chronic pain. pt additionally hypotensive, MD Waggoner aware and no new orders at this time. pt denies any chest pain, sob, dizziness, headaches. pt started on 1 unit PRBC. pt educated on s/s of blood transfusion reaction and verbalizes understanding. pt safety maintained w/ bed locked in lowest position and call bell in reach

## 2024-06-13 NOTE — ED PROVIDER NOTE - CLINICAL SUMMARY MEDICAL DECISION MAKING FREE TEXT BOX
History physical as noted.  Patient with metastatic breast cancer presents with anemia fatigue and abdominal pain.  Patient is tender in the left lower quadrant epigastric region, passing gas having bowel movements, no clinical suspicion for SBO.  No melena no hematochezia to suspect blood loss anemia.  Will recheck labs give pain meds and get CT imaging to evaluate for progression of disease.  Patient and sister at bedside agreeable to plan for imaging.  Dispo and further interventions pending

## 2024-06-13 NOTE — ED PROVIDER NOTE - OBJECTIVE STATEMENT
38-year-old female past medical history of metastatic breast cancer with mets to the brain presents with anemia.  Patient is followed by [facility] and cancer gets chemotherapy infusions every 3 weeks, last 2 weeks ago, was sent in by oncologist today for hemoglobin 6.5 on outpatient labs.  Patient states she has felt fatigued with abdominal pain for the last 3 days.  Having normal bowel movements passing gas no urinary symptoms.  Patient states she sometimes gets abdominal pain when she is anemic.  Takes Dilaudid at home for pain but did not take it today she came straight from oncologist office

## 2024-06-13 NOTE — ED PROVIDER NOTE - PHYSICAL EXAMINATION
PHYSICAL EXAM:   General: chronically ill but nontoxic appearing  HEENT: NC/AT, PERRLA, EOMI without nystagmus, airway patent  Cardiovascular: regular rate and rhythm, + S1/S2, no murmurs, rubs, gallops appreciated  Respiratory: slightly decreased breath sounds LLL, nonlabored respirations  Abdominal: soft, epigastric and LLQ TTP, nondistended, no rebound, guarding or rigidity  Extremities: no LE edema b/l.   Neuro: Awake alert interactive. CN2-12 grossly intact, Strength 5/5 in upper and lower extremities. Sensation intact to light touch in upper and lower extremities.  finger-to-nose WNL.   Psychiatric: appropriate mood and affect.    -Elisa Waggoner MD Attending Physician

## 2024-06-13 NOTE — ED PROVIDER NOTE - PATIENT PORTAL LINK FT
You can access the FollowMyHealth Patient Portal offered by [facility] by registering at the following website: [email or web address]. By joining [employer]’s FollowMyHealth portal, you will also be able to view your health information using other applications (apps) compatible with our system.

## 2024-06-13 NOTE — ED PROVIDER NOTE - HOW PATIENT ADDRESSED, PROFILE
VS stable, Pt given Granix 300mcg subq to back of upper right arm. Injection 3 of 3. Pt tolerated well.    Whitney Dave

## 2024-06-13 NOTE — ED ADULT NURSE NOTE - OBJECTIVE STATEMENT
Pt A+Ox4 c/o ABD pain and stating that her hemoglobin is low. Pt states that she got blood work today and her hemoglobin was 6.5. Pt states +weakness and feeling off for the past 3 days. Pt denies SOB or CP. Respirations are equal and unlabored on room air. Pt speaking coherent full sentences. Pt states that she has stage 4 cancer. Pt left in position of comfort, wheels of stretcher locked and in the lowest position.

## 2024-06-14 ENCOUNTER — APPOINTMENT (OUTPATIENT)
Dept: GASTROENTEROLOGY | Facility: HOSPITAL | Age: 39
End: 2024-06-14

## 2024-06-14 VITALS — DIASTOLIC BLOOD PRESSURE: 74 MMHG | SYSTOLIC BLOOD PRESSURE: 103 MMHG

## 2024-06-14 LAB
APPEARANCE UR: CLEAR — SIGNIFICANT CHANGE UP
BACTERIA # UR AUTO: NEGATIVE /HPF — SIGNIFICANT CHANGE UP
BILIRUB UR-MCNC: NEGATIVE — SIGNIFICANT CHANGE UP
CAST: 1 /LPF — SIGNIFICANT CHANGE UP (ref 0–4)
COLOR SPEC: YELLOW — SIGNIFICANT CHANGE UP
DIFF PNL FLD: NEGATIVE — SIGNIFICANT CHANGE UP
GLUCOSE UR QL: NEGATIVE MG/DL — SIGNIFICANT CHANGE UP
KETONES UR-MCNC: NEGATIVE MG/DL — SIGNIFICANT CHANGE UP
LEUKOCYTE ESTERASE UR-ACNC: ABNORMAL
NITRITE UR-MCNC: NEGATIVE — SIGNIFICANT CHANGE UP
PH UR: 7 — SIGNIFICANT CHANGE UP (ref 5–8)
PROT UR-MCNC: NEGATIVE MG/DL — SIGNIFICANT CHANGE UP
RBC CASTS # UR COMP ASSIST: 1 /HPF — SIGNIFICANT CHANGE UP (ref 0–4)
SP GR SPEC: >1.03 — HIGH (ref 1–1.03)
SQUAMOUS # UR AUTO: 2 /HPF — SIGNIFICANT CHANGE UP (ref 0–5)
UROBILINOGEN FLD QL: 1 MG/DL — SIGNIFICANT CHANGE UP (ref 0.2–1)
WBC UR QL: 53 /HPF — HIGH (ref 0–5)

## 2024-06-14 PROCEDURE — 85730 THROMBOPLASTIN TIME PARTIAL: CPT

## 2024-06-14 PROCEDURE — 96374 THER/PROPH/DIAG INJ IV PUSH: CPT | Mod: XU

## 2024-06-14 PROCEDURE — 74177 CT ABD & PELVIS W/CONTRAST: CPT | Mod: MC

## 2024-06-14 PROCEDURE — P9040: CPT

## 2024-06-14 PROCEDURE — 74177 CT ABD & PELVIS W/CONTRAST: CPT | Mod: 26,1L,MC

## 2024-06-14 PROCEDURE — 81001 URINALYSIS AUTO W/SCOPE: CPT

## 2024-06-14 PROCEDURE — 36415 COLL VENOUS BLD VENIPUNCTURE: CPT

## 2024-06-14 PROCEDURE — 85025 COMPLETE CBC W/AUTO DIFF WBC: CPT

## 2024-06-14 PROCEDURE — 84702 CHORIONIC GONADOTROPIN TEST: CPT

## 2024-06-14 PROCEDURE — 85610 PROTHROMBIN TIME: CPT

## 2024-06-14 PROCEDURE — 80053 COMPREHEN METABOLIC PANEL: CPT

## 2024-06-14 PROCEDURE — 86922 COMPATIBILITY TEST ANTIGLOB: CPT

## 2024-06-14 PROCEDURE — 99285 EMERGENCY DEPT VISIT HI MDM: CPT | Mod: 25

## 2024-06-14 PROCEDURE — 86850 RBC ANTIBODY SCREEN: CPT

## 2024-06-14 PROCEDURE — 87086 URINE CULTURE/COLONY COUNT: CPT

## 2024-06-14 PROCEDURE — 83690 ASSAY OF LIPASE: CPT

## 2024-06-14 PROCEDURE — 86901 BLOOD TYPING SEROLOGIC RH(D): CPT

## 2024-06-14 PROCEDURE — 96375 TX/PRO/DX INJ NEW DRUG ADDON: CPT

## 2024-06-14 PROCEDURE — 71046 X-RAY EXAM CHEST 2 VIEWS: CPT

## 2024-06-14 PROCEDURE — 36430 TRANSFUSION BLD/BLD COMPNT: CPT

## 2024-06-14 PROCEDURE — 86900 BLOOD TYPING SEROLOGIC ABO: CPT

## 2024-06-14 RX ORDER — CEPHALEXIN 500 MG
1 CAPSULE ORAL
Qty: 10 | Refills: 0
Start: 2024-06-14 | End: 2024-06-18

## 2024-06-14 RX ORDER — NITROFURANTOIN MACROCRYSTAL 50 MG
1 CAPSULE ORAL
Qty: 14 | Refills: 0
Start: 2024-06-14 | End: 2024-06-20

## 2024-06-14 RX ORDER — CEPHALEXIN 500 MG
1 CAPSULE ORAL
Qty: 21 | Refills: 0
Start: 2024-06-14 | End: 2024-06-20

## 2024-06-14 RX ORDER — CEFTRIAXONE 500 MG/1
1000 INJECTION, POWDER, FOR SOLUTION INTRAMUSCULAR; INTRAVENOUS ONCE
Refills: 0 | Status: DISCONTINUED | OUTPATIENT
Start: 2024-06-14 | End: 2024-06-14

## 2024-06-14 RX ORDER — CEFTRIAXONE 500 MG/1
1000 INJECTION, POWDER, FOR SOLUTION INTRAMUSCULAR; INTRAVENOUS ONCE
Refills: 0 | Status: COMPLETED | OUTPATIENT
Start: 2024-06-14 | End: 2024-06-14

## 2024-06-14 RX ADMIN — CEFTRIAXONE 1000 MILLIGRAM(S): 500 INJECTION, POWDER, FOR SOLUTION INTRAMUSCULAR; INTRAVENOUS at 03:35

## 2024-06-15 LAB
CULTURE RESULTS: SIGNIFICANT CHANGE UP
SPECIMEN SOURCE: SIGNIFICANT CHANGE UP

## 2024-07-02 ENCOUNTER — TRANSCRIPTION ENCOUNTER (OUTPATIENT)
Age: 39
End: 2024-07-02

## 2024-07-02 ENCOUNTER — INPATIENT (INPATIENT)
Facility: HOSPITAL | Age: 39
LOS: 1 days | Discharge: HOME | DRG: 812 | End: 2024-07-04
Attending: STUDENT IN AN ORGANIZED HEALTH CARE EDUCATION/TRAINING PROGRAM | Admitting: STUDENT IN AN ORGANIZED HEALTH CARE EDUCATION/TRAINING PROGRAM
Payer: MEDICARE

## 2024-07-02 VITALS
TEMPERATURE: 99 F | RESPIRATION RATE: 18 BRPM | SYSTOLIC BLOOD PRESSURE: 106 MMHG | HEIGHT: 62 IN | HEART RATE: 93 BPM | DIASTOLIC BLOOD PRESSURE: 71 MMHG | OXYGEN SATURATION: 95 % | WEIGHT: 137.13 LBS

## 2024-07-02 DIAGNOSIS — Z90.89 ACQUIRED ABSENCE OF OTHER ORGANS: Chronic | ICD-10-CM

## 2024-07-02 DIAGNOSIS — Z98.890 OTHER SPECIFIED POSTPROCEDURAL STATES: Chronic | ICD-10-CM

## 2024-07-02 DIAGNOSIS — K92.2 GASTROINTESTINAL HEMORRHAGE, UNSPECIFIED: ICD-10-CM

## 2024-07-02 LAB
ALBUMIN SERPL ELPH-MCNC: 2.6 G/DL — LOW (ref 3.3–5.2)
ALBUMIN SERPL ELPH-MCNC: 2.6 G/DL — LOW (ref 3.3–5.2)
ALP SERPL-CCNC: 172 U/L — HIGH (ref 40–120)
ALP SERPL-CCNC: 186 U/L — HIGH (ref 40–120)
ALT FLD-CCNC: 18 U/L — SIGNIFICANT CHANGE UP
ALT FLD-CCNC: 22 U/L — SIGNIFICANT CHANGE UP
ANION GAP SERPL CALC-SCNC: 12 MMOL/L — SIGNIFICANT CHANGE UP (ref 5–17)
ANION GAP SERPL CALC-SCNC: 8 MMOL/L — SIGNIFICANT CHANGE UP (ref 5–17)
ANISOCYTOSIS BLD QL: SIGNIFICANT CHANGE UP
AST SERPL-CCNC: 35 U/L — HIGH
AST SERPL-CCNC: 47 U/L — HIGH
BASOPHILS # BLD AUTO: 0 K/UL — SIGNIFICANT CHANGE UP (ref 0–0.2)
BASOPHILS NFR BLD AUTO: 0 % — SIGNIFICANT CHANGE UP (ref 0–2)
BILIRUB SERPL-MCNC: 0.5 MG/DL — SIGNIFICANT CHANGE UP (ref 0.4–2)
BILIRUB SERPL-MCNC: 0.5 MG/DL — SIGNIFICANT CHANGE UP (ref 0.4–2)
BLD GP AB SCN SERPL QL: SIGNIFICANT CHANGE UP
BUN SERPL-MCNC: 7.1 MG/DL — LOW (ref 8–20)
BUN SERPL-MCNC: 7.1 MG/DL — LOW (ref 8–20)
CALCIUM SERPL-MCNC: 6.6 MG/DL — LOW (ref 8.4–10.5)
CALCIUM SERPL-MCNC: 7 MG/DL — LOW (ref 8.4–10.5)
CHLORIDE SERPL-SCNC: 108 MMOL/L — SIGNIFICANT CHANGE UP (ref 96–108)
CHLORIDE SERPL-SCNC: 109 MMOL/L — HIGH (ref 96–108)
CO2 SERPL-SCNC: 21 MMOL/L — LOW (ref 22–29)
CO2 SERPL-SCNC: 23 MMOL/L — SIGNIFICANT CHANGE UP (ref 22–29)
CREAT SERPL-MCNC: 0.42 MG/DL — LOW (ref 0.5–1.3)
CREAT SERPL-MCNC: 0.45 MG/DL — LOW (ref 0.5–1.3)
DACRYOCYTES BLD QL SMEAR: SIGNIFICANT CHANGE UP
EGFR: 127 ML/MIN/1.73M2 — SIGNIFICANT CHANGE UP
EGFR: 129 ML/MIN/1.73M2 — SIGNIFICANT CHANGE UP
EOSINOPHIL # BLD AUTO: 0.08 K/UL — SIGNIFICANT CHANGE UP (ref 0–0.5)
EOSINOPHIL NFR BLD AUTO: 2.6 % — SIGNIFICANT CHANGE UP (ref 0–6)
GIANT PLATELETS BLD QL SMEAR: PRESENT — SIGNIFICANT CHANGE UP
GLUCOSE SERPL-MCNC: 109 MG/DL — HIGH (ref 70–99)
GLUCOSE SERPL-MCNC: 90 MG/DL — SIGNIFICANT CHANGE UP (ref 70–99)
HCT VFR BLD CALC: 13.9 % — CRITICAL LOW (ref 34.5–45)
HGB BLD-MCNC: 4.2 G/DL — CRITICAL LOW (ref 11.5–15.5)
LDH SERPL L TO P-CCNC: 243 U/L — HIGH (ref 98–192)
LYMPHOCYTES # BLD AUTO: 0.4 K/UL — LOW (ref 1–3.3)
LYMPHOCYTES # BLD AUTO: 12.3 % — LOW (ref 13–44)
MACROCYTES BLD QL: SIGNIFICANT CHANGE UP
MANUAL SMEAR VERIFICATION: SIGNIFICANT CHANGE UP
MCHC RBC-ENTMCNC: 30.2 GM/DL — LOW (ref 32–36)
MCHC RBC-ENTMCNC: 31.3 PG — SIGNIFICANT CHANGE UP (ref 27–34)
MCV RBC AUTO: 103.7 FL — HIGH (ref 80–100)
MONOCYTES # BLD AUTO: 0.2 K/UL — SIGNIFICANT CHANGE UP (ref 0–0.9)
MONOCYTES NFR BLD AUTO: 6.1 % — SIGNIFICANT CHANGE UP (ref 2–14)
NEUTROPHILS # BLD AUTO: 2.58 K/UL — SIGNIFICANT CHANGE UP (ref 1.8–7.4)
NEUTROPHILS NFR BLD AUTO: 79 % — HIGH (ref 43–77)
OB PNL STL: POSITIVE
OVALOCYTES BLD QL SMEAR: SLIGHT — SIGNIFICANT CHANGE UP
PLAT MORPH BLD: NORMAL — SIGNIFICANT CHANGE UP
PLATELET # BLD AUTO: 179 K/UL — SIGNIFICANT CHANGE UP (ref 150–400)
POIKILOCYTOSIS BLD QL AUTO: SIGNIFICANT CHANGE UP
POLYCHROMASIA BLD QL SMEAR: SIGNIFICANT CHANGE UP
POTASSIUM SERPL-MCNC: 3.2 MMOL/L — LOW (ref 3.5–5.3)
POTASSIUM SERPL-MCNC: 3.5 MMOL/L — SIGNIFICANT CHANGE UP (ref 3.5–5.3)
POTASSIUM SERPL-SCNC: 3.2 MMOL/L — LOW (ref 3.5–5.3)
POTASSIUM SERPL-SCNC: 3.5 MMOL/L — SIGNIFICANT CHANGE UP (ref 3.5–5.3)
PROT SERPL-MCNC: 4.4 G/DL — LOW (ref 6.6–8.7)
PROT SERPL-MCNC: 4.8 G/DL — LOW (ref 6.6–8.7)
RBC # BLD: 1.26 M/UL — LOW (ref 3.8–5.2)
RBC # BLD: 1.34 M/UL — LOW (ref 3.8–5.2)
RBC # FLD: 23.9 % — HIGH (ref 10.3–14.5)
RBC BLD AUTO: ABNORMAL
RETICS #: 158.5 K/UL — HIGH (ref 25–125)
RETICS/RBC NFR: 12.6 % — HIGH (ref 0.5–2.5)
SODIUM SERPL-SCNC: 140 MMOL/L — SIGNIFICANT CHANGE UP (ref 135–145)
SODIUM SERPL-SCNC: 141 MMOL/L — SIGNIFICANT CHANGE UP (ref 135–145)
WBC # BLD: 3.26 K/UL — LOW (ref 3.8–10.5)
WBC # FLD AUTO: 3.26 K/UL — LOW (ref 3.8–10.5)

## 2024-07-02 PROCEDURE — 99497 ADVNCD CARE PLAN 30 MIN: CPT | Mod: 25

## 2024-07-02 PROCEDURE — 99285 EMERGENCY DEPT VISIT HI MDM: CPT

## 2024-07-02 PROCEDURE — 99223 1ST HOSP IP/OBS HIGH 75: CPT

## 2024-07-02 RX ORDER — PANTOPRAZOLE SODIUM 40 MG/10ML
40 INJECTION, POWDER, FOR SOLUTION INTRAVENOUS
Refills: 0 | Status: DISCONTINUED | OUTPATIENT
Start: 2024-07-02 | End: 2024-07-04

## 2024-07-02 RX ORDER — PANTOPRAZOLE SODIUM 40 MG/10ML
80 INJECTION, POWDER, FOR SOLUTION INTRAVENOUS ONCE
Refills: 0 | Status: COMPLETED | OUTPATIENT
Start: 2024-07-02 | End: 2024-07-02

## 2024-07-02 RX ORDER — METHYLPHENIDATE 30MG/9HR
20 PATCH, TRANSDERMAL 24 HOURS TRANSDERMAL
Refills: 0 | Status: DISCONTINUED | OUTPATIENT
Start: 2024-07-02 | End: 2024-07-04

## 2024-07-02 RX ORDER — HYDROMORPHONE HCL 0.2 MG/ML
2 INJECTION, SOLUTION INTRAVENOUS
Refills: 0 | Status: DISCONTINUED | OUTPATIENT
Start: 2024-07-02 | End: 2024-07-02

## 2024-07-02 RX ORDER — PANTOPRAZOLE SODIUM 40 MG/10ML
8 INJECTION, POWDER, FOR SOLUTION INTRAVENOUS
Qty: 80 | Refills: 0 | Status: DISCONTINUED | OUTPATIENT
Start: 2024-07-02 | End: 2024-07-02

## 2024-07-02 RX ORDER — ONDANSETRON HYDROCHLORIDE 2 MG/ML
4 INJECTION INTRAMUSCULAR; INTRAVENOUS EVERY 8 HOURS
Refills: 0 | Status: DISCONTINUED | OUTPATIENT
Start: 2024-07-02 | End: 2024-07-04

## 2024-07-02 RX ORDER — HYDROMORPHONE HCL 0.2 MG/ML
1.5 INJECTION, SOLUTION INTRAVENOUS EVERY 4 HOURS
Refills: 0 | Status: DISCONTINUED | OUTPATIENT
Start: 2024-07-02 | End: 2024-07-04

## 2024-07-02 RX ORDER — LORAZEPAM 0.5 MG
0.5 TABLET ORAL EVERY 6 HOURS
Refills: 0 | Status: DISCONTINUED | OUTPATIENT
Start: 2024-07-02 | End: 2024-07-04

## 2024-07-02 RX ORDER — MEMANTINE HYDROCHLORIDE 7 MG/1
10 CAPSULE, EXTENDED RELEASE ORAL
Refills: 0 | Status: DISCONTINUED | OUTPATIENT
Start: 2024-07-02 | End: 2024-07-04

## 2024-07-02 RX ORDER — ACETAMINOPHEN 325 MG
650 TABLET ORAL EVERY 6 HOURS
Refills: 0 | Status: DISCONTINUED | OUTPATIENT
Start: 2024-07-02 | End: 2024-07-04

## 2024-07-02 RX ORDER — HYDROMORPHONE HCL 0.2 MG/ML
2 INJECTION, SOLUTION INTRAVENOUS
Refills: 0 | Status: DISCONTINUED | OUTPATIENT
Start: 2024-07-02 | End: 2024-07-04

## 2024-07-02 RX ORDER — MAGNESIUM, ALUMINUM HYDROXIDE 400-400
30 TABLET,CHEWABLE ORAL EVERY 4 HOURS
Refills: 0 | Status: DISCONTINUED | OUTPATIENT
Start: 2024-07-02 | End: 2024-07-04

## 2024-07-02 RX ORDER — HYDROMORPHONE HCL 0.2 MG/ML
2 INJECTION, SOLUTION INTRAVENOUS ONCE
Refills: 0 | Status: DISCONTINUED | OUTPATIENT
Start: 2024-07-02 | End: 2024-07-02

## 2024-07-02 RX ORDER — POTASSIUM CHLORIDE 600 MG/1
40 TABLET, FILM COATED, EXTENDED RELEASE ORAL EVERY 4 HOURS
Refills: 0 | Status: COMPLETED | OUTPATIENT
Start: 2024-07-02 | End: 2024-07-03

## 2024-07-02 RX ORDER — FLUOXETINE HCL 40 MG
40 CAPSULE ORAL
Refills: 0 | Status: DISCONTINUED | OUTPATIENT
Start: 2024-07-02 | End: 2024-07-04

## 2024-07-02 RX ORDER — PREGABALIN 50 MG/1
200 CAPSULE ORAL THREE TIMES A DAY
Refills: 0 | Status: DISCONTINUED | OUTPATIENT
Start: 2024-07-02 | End: 2024-07-04

## 2024-07-02 RX ORDER — HYDROMORPHONE HCL 0.2 MG/ML
3 INJECTION, SOLUTION INTRAVENOUS EVERY 4 HOURS
Refills: 0 | Status: DISCONTINUED | OUTPATIENT
Start: 2024-07-02 | End: 2024-07-04

## 2024-07-02 RX ADMIN — HYDROMORPHONE HCL 2 MILLIGRAM(S): 0.2 INJECTION, SOLUTION INTRAVENOUS at 22:38

## 2024-07-02 RX ADMIN — HYDROMORPHONE HCL 2 MILLIGRAM(S): 0.2 INJECTION, SOLUTION INTRAVENOUS at 17:18

## 2024-07-02 RX ADMIN — HYDROMORPHONE HCL 2 MILLIGRAM(S): 0.2 INJECTION, SOLUTION INTRAVENOUS at 20:00

## 2024-07-02 RX ADMIN — PANTOPRAZOLE SODIUM 10 MG/HR: 40 INJECTION, POWDER, FOR SOLUTION INTRAVENOUS at 20:41

## 2024-07-02 RX ADMIN — HYDROMORPHONE HCL 2 MILLIGRAM(S): 0.2 INJECTION, SOLUTION INTRAVENOUS at 19:38

## 2024-07-02 RX ADMIN — HYDROMORPHONE HCL 2 MILLIGRAM(S): 0.2 INJECTION, SOLUTION INTRAVENOUS at 23:00

## 2024-07-02 RX ADMIN — PANTOPRAZOLE SODIUM 80 MILLIGRAM(S): 40 INJECTION, POWDER, FOR SOLUTION INTRAVENOUS at 19:39

## 2024-07-03 LAB
ALBUMIN SERPL ELPH-MCNC: 2.6 G/DL — LOW (ref 3.3–5.2)
ALP SERPL-CCNC: 190 U/L — HIGH (ref 40–120)
ALT FLD-CCNC: 18 U/L — SIGNIFICANT CHANGE UP
ANION GAP SERPL CALC-SCNC: 8 MMOL/L — SIGNIFICANT CHANGE UP (ref 5–17)
AST SERPL-CCNC: 36 U/L — HIGH
BILIRUB SERPL-MCNC: 2 MG/DL — SIGNIFICANT CHANGE UP (ref 0.4–2)
BUN SERPL-MCNC: 4.7 MG/DL — LOW (ref 8–20)
CALCIUM SERPL-MCNC: 6.4 MG/DL — CRITICAL LOW (ref 8.4–10.5)
CHLORIDE SERPL-SCNC: 107 MMOL/L — SIGNIFICANT CHANGE UP (ref 96–108)
CO2 SERPL-SCNC: 22 MMOL/L — SIGNIFICANT CHANGE UP (ref 22–29)
CREAT SERPL-MCNC: 0.37 MG/DL — LOW (ref 0.5–1.3)
EGFR: 133 ML/MIN/1.73M2 — SIGNIFICANT CHANGE UP
FERRITIN SERPL-MCNC: 465 NG/ML — HIGH (ref 15–150)
FOLATE SERPL-MCNC: 12.4 NG/ML — SIGNIFICANT CHANGE UP
GLUCOSE SERPL-MCNC: 111 MG/DL — HIGH (ref 70–99)
HAPTOGLOB SERPL-MCNC: 73 MG/DL — SIGNIFICANT CHANGE UP (ref 34–200)
HCG SERPL-ACNC: <4 MIU/ML — SIGNIFICANT CHANGE UP
HCT VFR BLD CALC: 23.3 % — LOW (ref 34.5–45)
HGB BLD-MCNC: 7.5 G/DL — LOW (ref 11.5–15.5)
IRON SATN MFR SERPL: 14 % — SIGNIFICANT CHANGE UP (ref 14–50)
IRON SATN MFR SERPL: 32 UG/DL — LOW (ref 37–145)
MAGNESIUM SERPL-MCNC: 1.8 MG/DL — SIGNIFICANT CHANGE UP (ref 1.6–2.6)
MCHC RBC-ENTMCNC: 30.6 PG — SIGNIFICANT CHANGE UP (ref 27–34)
MCHC RBC-ENTMCNC: 32.2 GM/DL — SIGNIFICANT CHANGE UP (ref 32–36)
MCV RBC AUTO: 95.1 FL — SIGNIFICANT CHANGE UP (ref 80–100)
PLATELET # BLD AUTO: 163 K/UL — SIGNIFICANT CHANGE UP (ref 150–400)
POTASSIUM SERPL-MCNC: 4.1 MMOL/L — SIGNIFICANT CHANGE UP (ref 3.5–5.3)
POTASSIUM SERPL-SCNC: 4.1 MMOL/L — SIGNIFICANT CHANGE UP (ref 3.5–5.3)
PROT SERPL-MCNC: 4.5 G/DL — LOW (ref 6.6–8.7)
RBC # BLD: 2.45 M/UL — LOW (ref 3.8–5.2)
RBC # FLD: 22.6 % — HIGH (ref 10.3–14.5)
SODIUM SERPL-SCNC: 137 MMOL/L — SIGNIFICANT CHANGE UP (ref 135–145)
TIBC SERPL-MCNC: 226 UG/DL — SIGNIFICANT CHANGE UP (ref 220–430)
TRANSFERRIN SERPL-MCNC: 158 MG/DL — LOW (ref 192–382)
VIT B12 SERPL-MCNC: 902 PG/ML — SIGNIFICANT CHANGE UP (ref 232–1245)
WBC # BLD: 3.4 K/UL — LOW (ref 3.8–10.5)
WBC # FLD AUTO: 3.4 K/UL — LOW (ref 3.8–10.5)

## 2024-07-03 PROCEDURE — 99232 SBSQ HOSP IP/OBS MODERATE 35: CPT

## 2024-07-03 PROCEDURE — 43235 EGD DIAGNOSTIC BRUSH WASH: CPT

## 2024-07-03 PROCEDURE — 99223 1ST HOSP IP/OBS HIGH 75: CPT | Mod: 25

## 2024-07-03 RX ORDER — CALCIUM GLUCONATE 98 MG/ML
2 INJECTION, SOLUTION INTRAVENOUS ONCE
Refills: 0 | Status: COMPLETED | OUTPATIENT
Start: 2024-07-03 | End: 2024-07-03

## 2024-07-03 RX ORDER — OCTREOTIDE ACETATE 100 UG/ML
30 INJECTION, SOLUTION INTRAVENOUS; SUBCUTANEOUS ONCE
Refills: 0 | Status: DISCONTINUED | OUTPATIENT
Start: 2024-07-03 | End: 2024-07-03

## 2024-07-03 RX ORDER — OCTREOTIDE ACETATE 100 UG/ML
50 INJECTION, SOLUTION INTRAVENOUS; SUBCUTANEOUS DAILY
Refills: 0 | Status: DISCONTINUED | OUTPATIENT
Start: 2024-07-03 | End: 2024-07-04

## 2024-07-03 RX ADMIN — HYDROMORPHONE HCL 3 MILLIGRAM(S): 0.2 INJECTION, SOLUTION INTRAVENOUS at 13:45

## 2024-07-03 RX ADMIN — MEMANTINE HYDROCHLORIDE 10 MILLIGRAM(S): 7 CAPSULE, EXTENDED RELEASE ORAL at 17:18

## 2024-07-03 RX ADMIN — PREGABALIN 200 MILLIGRAM(S): 50 CAPSULE ORAL at 13:45

## 2024-07-03 RX ADMIN — HYDROMORPHONE HCL 2 MILLIGRAM(S): 0.2 INJECTION, SOLUTION INTRAVENOUS at 01:42

## 2024-07-03 RX ADMIN — PANTOPRAZOLE SODIUM 40 MILLIGRAM(S): 40 INJECTION, POWDER, FOR SOLUTION INTRAVENOUS at 17:23

## 2024-07-03 RX ADMIN — OCTREOTIDE ACETATE 50 MICROGRAM(S): 100 INJECTION, SOLUTION INTRAVENOUS; SUBCUTANEOUS at 18:26

## 2024-07-03 RX ADMIN — HYDROMORPHONE HCL 3 MILLIGRAM(S): 0.2 INJECTION, SOLUTION INTRAVENOUS at 08:52

## 2024-07-03 RX ADMIN — Medication 1 TABLET(S): at 13:45

## 2024-07-03 RX ADMIN — POTASSIUM CHLORIDE 40 MILLIEQUIVALENT(S): 600 TABLET, FILM COATED, EXTENDED RELEASE ORAL at 02:22

## 2024-07-03 RX ADMIN — HYDROMORPHONE HCL 1.5 MILLIGRAM(S): 0.2 INJECTION, SOLUTION INTRAVENOUS at 09:23

## 2024-07-03 RX ADMIN — PANTOPRAZOLE SODIUM 40 MILLIGRAM(S): 40 INJECTION, POWDER, FOR SOLUTION INTRAVENOUS at 05:35

## 2024-07-03 RX ADMIN — POTASSIUM CHLORIDE 40 MILLIEQUIVALENT(S): 600 TABLET, FILM COATED, EXTENDED RELEASE ORAL at 05:34

## 2024-07-03 RX ADMIN — HYDROMORPHONE HCL 3 MILLIGRAM(S): 0.2 INJECTION, SOLUTION INTRAVENOUS at 17:21

## 2024-07-03 RX ADMIN — MEMANTINE HYDROCHLORIDE 10 MILLIGRAM(S): 7 CAPSULE, EXTENDED RELEASE ORAL at 05:34

## 2024-07-03 RX ADMIN — HYDROMORPHONE HCL 2 MILLIGRAM(S): 0.2 INJECTION, SOLUTION INTRAVENOUS at 11:04

## 2024-07-03 RX ADMIN — Medication 40 MILLIGRAM(S): at 17:18

## 2024-07-03 RX ADMIN — HYDROMORPHONE HCL 2 MILLIGRAM(S): 0.2 INJECTION, SOLUTION INTRAVENOUS at 00:42

## 2024-07-03 RX ADMIN — PREGABALIN 200 MILLIGRAM(S): 50 CAPSULE ORAL at 05:34

## 2024-07-03 RX ADMIN — Medication 40 MILLIGRAM(S): at 05:34

## 2024-07-03 RX ADMIN — HYDROMORPHONE HCL 1.5 MILLIGRAM(S): 0.2 INJECTION, SOLUTION INTRAVENOUS at 08:23

## 2024-07-03 RX ADMIN — Medication 1 APPLICATION(S): at 11:04

## 2024-07-03 RX ADMIN — Medication 20 MILLIGRAM(S): at 05:36

## 2024-07-03 RX ADMIN — CALCIUM GLUCONATE 200 GRAM(S): 98 INJECTION, SOLUTION INTRAVENOUS at 13:45

## 2024-07-04 ENCOUNTER — TRANSCRIPTION ENCOUNTER (OUTPATIENT)
Age: 39
End: 2024-07-04

## 2024-07-04 VITALS
SYSTOLIC BLOOD PRESSURE: 110 MMHG | TEMPERATURE: 98 F | RESPIRATION RATE: 18 BRPM | OXYGEN SATURATION: 96 % | DIASTOLIC BLOOD PRESSURE: 74 MMHG | HEART RATE: 88 BPM

## 2024-07-04 LAB
ALBUMIN SERPL ELPH-MCNC: 2.6 G/DL — LOW (ref 3.3–5.2)
ALP SERPL-CCNC: 208 U/L — HIGH (ref 40–120)
ALT FLD-CCNC: 15 U/L — SIGNIFICANT CHANGE UP
ANION GAP SERPL CALC-SCNC: 6 MMOL/L — SIGNIFICANT CHANGE UP (ref 5–17)
AST SERPL-CCNC: 30 U/L — SIGNIFICANT CHANGE UP
BILIRUB SERPL-MCNC: 0.7 MG/DL — SIGNIFICANT CHANGE UP (ref 0.4–2)
BUN SERPL-MCNC: 6.6 MG/DL — LOW (ref 8–20)
CALCIUM SERPL-MCNC: 7.3 MG/DL — LOW (ref 8.4–10.5)
CHLORIDE SERPL-SCNC: 111 MMOL/L — HIGH (ref 96–108)
CO2 SERPL-SCNC: 23 MMOL/L — SIGNIFICANT CHANGE UP (ref 22–29)
CREAT SERPL-MCNC: 0.42 MG/DL — LOW (ref 0.5–1.3)
EGFR: 129 ML/MIN/1.73M2 — SIGNIFICANT CHANGE UP
GLUCOSE SERPL-MCNC: 102 MG/DL — HIGH (ref 70–99)
HCT VFR BLD CALC: 22.5 % — LOW (ref 34.5–45)
HGB BLD-MCNC: 7.3 G/DL — LOW (ref 11.5–15.5)
MAGNESIUM SERPL-MCNC: 1.9 MG/DL — SIGNIFICANT CHANGE UP (ref 1.6–2.6)
MCHC RBC-ENTMCNC: 30.9 PG — SIGNIFICANT CHANGE UP (ref 27–34)
MCHC RBC-ENTMCNC: 32.4 GM/DL — SIGNIFICANT CHANGE UP (ref 32–36)
MCV RBC AUTO: 95.3 FL — SIGNIFICANT CHANGE UP (ref 80–100)
MRSA PCR RESULT.: SIGNIFICANT CHANGE UP
PHOSPHATE SERPL-MCNC: 2.2 MG/DL — LOW (ref 2.4–4.7)
PLATELET # BLD AUTO: 184 K/UL — SIGNIFICANT CHANGE UP (ref 150–400)
POTASSIUM SERPL-MCNC: 4.4 MMOL/L — SIGNIFICANT CHANGE UP (ref 3.5–5.3)
POTASSIUM SERPL-SCNC: 4.4 MMOL/L — SIGNIFICANT CHANGE UP (ref 3.5–5.3)
PROT SERPL-MCNC: 4.5 G/DL — LOW (ref 6.6–8.7)
RBC # BLD: 2.36 M/UL — LOW (ref 3.8–5.2)
RBC # FLD: 24.4 % — HIGH (ref 10.3–14.5)
S AUREUS DNA NOSE QL NAA+PROBE: DETECTED
SODIUM SERPL-SCNC: 140 MMOL/L — SIGNIFICANT CHANGE UP (ref 135–145)
WBC # BLD: 3.29 K/UL — LOW (ref 3.8–10.5)
WBC # FLD AUTO: 3.29 K/UL — LOW (ref 3.8–10.5)

## 2024-07-04 PROCEDURE — 84466 ASSAY OF TRANSFERRIN: CPT

## 2024-07-04 PROCEDURE — 96376 TX/PRO/DX INJ SAME DRUG ADON: CPT

## 2024-07-04 PROCEDURE — 83921 ORGANIC ACID SINGLE QUANT: CPT

## 2024-07-04 PROCEDURE — 83615 LACTATE (LD) (LDH) ENZYME: CPT

## 2024-07-04 PROCEDURE — 96375 TX/PRO/DX INJ NEW DRUG ADDON: CPT

## 2024-07-04 PROCEDURE — 99232 SBSQ HOSP IP/OBS MODERATE 35: CPT

## 2024-07-04 PROCEDURE — 87640 STAPH A DNA AMP PROBE: CPT

## 2024-07-04 PROCEDURE — 86901 BLOOD TYPING SEROLOGIC RH(D): CPT

## 2024-07-04 PROCEDURE — 85027 COMPLETE CBC AUTOMATED: CPT

## 2024-07-04 PROCEDURE — 82607 VITAMIN B-12: CPT

## 2024-07-04 PROCEDURE — 36430 TRANSFUSION BLD/BLD COMPNT: CPT

## 2024-07-04 PROCEDURE — 85025 COMPLETE CBC W/AUTO DIFF WBC: CPT

## 2024-07-04 PROCEDURE — 86850 RBC ANTIBODY SCREEN: CPT

## 2024-07-04 PROCEDURE — 86922 COMPATIBILITY TEST ANTIGLOB: CPT

## 2024-07-04 PROCEDURE — 83550 IRON BINDING TEST: CPT

## 2024-07-04 PROCEDURE — 82272 OCCULT BLD FECES 1-3 TESTS: CPT

## 2024-07-04 PROCEDURE — P9040: CPT

## 2024-07-04 PROCEDURE — 83735 ASSAY OF MAGNESIUM: CPT

## 2024-07-04 PROCEDURE — 83010 ASSAY OF HAPTOGLOBIN QUANT: CPT

## 2024-07-04 PROCEDURE — 99239 HOSP IP/OBS DSCHRG MGMT >30: CPT

## 2024-07-04 PROCEDURE — 99285 EMERGENCY DEPT VISIT HI MDM: CPT | Mod: 25

## 2024-07-04 PROCEDURE — 85045 AUTOMATED RETICULOCYTE COUNT: CPT

## 2024-07-04 PROCEDURE — 87641 MR-STAPH DNA AMP PROBE: CPT

## 2024-07-04 PROCEDURE — 96374 THER/PROPH/DIAG INJ IV PUSH: CPT

## 2024-07-04 PROCEDURE — 83090 ASSAY OF HOMOCYSTEINE: CPT

## 2024-07-04 PROCEDURE — 83540 ASSAY OF IRON: CPT

## 2024-07-04 PROCEDURE — 80053 COMPREHEN METABOLIC PANEL: CPT

## 2024-07-04 PROCEDURE — 82746 ASSAY OF FOLIC ACID SERUM: CPT

## 2024-07-04 PROCEDURE — 36415 COLL VENOUS BLD VENIPUNCTURE: CPT

## 2024-07-04 PROCEDURE — 82728 ASSAY OF FERRITIN: CPT

## 2024-07-04 PROCEDURE — 86900 BLOOD TYPING SEROLOGIC ABO: CPT

## 2024-07-04 PROCEDURE — 84100 ASSAY OF PHOSPHORUS: CPT

## 2024-07-04 PROCEDURE — 84702 CHORIONIC GONADOTROPIN TEST: CPT

## 2024-07-04 RX ORDER — PANTOPRAZOLE SODIUM 40 MG/10ML
1 INJECTION, POWDER, FOR SOLUTION INTRAVENOUS
Qty: 90 | Refills: 0
Start: 2024-07-04 | End: 2024-10-01

## 2024-07-04 RX ORDER — HEPARIN SODIUM,PORCINE/PF 10 UNIT/ML
300 SYRINGE (ML) INTRAVENOUS ONCE
Refills: 0 | Status: COMPLETED | OUTPATIENT
Start: 2024-07-04 | End: 2024-07-04

## 2024-07-04 RX ADMIN — HYDROMORPHONE HCL 3 MILLIGRAM(S): 0.2 INJECTION, SOLUTION INTRAVENOUS at 10:00

## 2024-07-04 RX ADMIN — PREGABALIN 200 MILLIGRAM(S): 50 CAPSULE ORAL at 13:16

## 2024-07-04 RX ADMIN — Medication 20 MILLIGRAM(S): at 13:16

## 2024-07-04 RX ADMIN — Medication 40 MILLIGRAM(S): at 05:12

## 2024-07-04 RX ADMIN — MEMANTINE HYDROCHLORIDE 10 MILLIGRAM(S): 7 CAPSULE, EXTENDED RELEASE ORAL at 05:12

## 2024-07-04 RX ADMIN — Medication 300 UNIT(S): at 16:16

## 2024-07-04 RX ADMIN — HYDROMORPHONE HCL 3 MILLIGRAM(S): 0.2 INJECTION, SOLUTION INTRAVENOUS at 13:36

## 2024-07-04 RX ADMIN — HYDROMORPHONE HCL 2 MILLIGRAM(S): 0.2 INJECTION, SOLUTION INTRAVENOUS at 07:49

## 2024-07-04 RX ADMIN — Medication 20 MILLIGRAM(S): at 05:12

## 2024-07-04 RX ADMIN — HYDROMORPHONE HCL 3 MILLIGRAM(S): 0.2 INJECTION, SOLUTION INTRAVENOUS at 14:00

## 2024-07-04 RX ADMIN — Medication 1 APPLICATION(S): at 05:13

## 2024-07-04 RX ADMIN — PANTOPRAZOLE SODIUM 40 MILLIGRAM(S): 40 INJECTION, POWDER, FOR SOLUTION INTRAVENOUS at 05:12

## 2024-07-04 RX ADMIN — OCTREOTIDE ACETATE 50 MICROGRAM(S): 100 INJECTION, SOLUTION INTRAVENOUS; SUBCUTANEOUS at 14:22

## 2024-07-04 RX ADMIN — PREGABALIN 200 MILLIGRAM(S): 50 CAPSULE ORAL at 05:12

## 2024-07-04 RX ADMIN — HYDROMORPHONE HCL 2 MILLIGRAM(S): 0.2 INJECTION, SOLUTION INTRAVENOUS at 06:53

## 2024-07-04 RX ADMIN — HYDROMORPHONE HCL 3 MILLIGRAM(S): 0.2 INJECTION, SOLUTION INTRAVENOUS at 09:12

## 2024-07-04 RX ADMIN — HYDROMORPHONE HCL 3 MILLIGRAM(S): 0.2 INJECTION, SOLUTION INTRAVENOUS at 05:11

## 2024-07-04 RX ADMIN — Medication 1 TABLET(S): at 13:15

## 2024-07-04 RX ADMIN — HYDROMORPHONE HCL 3 MILLIGRAM(S): 0.2 INJECTION, SOLUTION INTRAVENOUS at 06:11

## 2024-07-08 ENCOUNTER — EMERGENCY (EMERGENCY)
Facility: HOSPITAL | Age: 39
LOS: 1 days | Discharge: HOME | End: 2024-07-08
Attending: EMERGENCY MEDICINE
Payer: MEDICARE

## 2024-07-08 VITALS
WEIGHT: 139.77 LBS | DIASTOLIC BLOOD PRESSURE: 49 MMHG | RESPIRATION RATE: 20 BRPM | SYSTOLIC BLOOD PRESSURE: 95 MMHG | TEMPERATURE: 99 F | HEIGHT: 62 IN | HEART RATE: 76 BPM | OXYGEN SATURATION: 100 %

## 2024-07-08 DIAGNOSIS — Z90.89 ACQUIRED ABSENCE OF OTHER ORGANS: Chronic | ICD-10-CM

## 2024-07-08 DIAGNOSIS — Z98.890 OTHER SPECIFIED POSTPROCEDURAL STATES: Chronic | ICD-10-CM

## 2024-07-08 LAB
ALBUMIN SERPL ELPH-MCNC: 2.8 G/DL — LOW (ref 3.3–5.2)
ALP SERPL-CCNC: 233 U/L — HIGH (ref 40–120)
ALT FLD-CCNC: 22 U/L — SIGNIFICANT CHANGE UP
ANION GAP SERPL CALC-SCNC: 8 MMOL/L — SIGNIFICANT CHANGE UP (ref 5–17)
ANISOCYTOSIS BLD QL: SLIGHT — SIGNIFICANT CHANGE UP
AST SERPL-CCNC: 35 U/L — HIGH
BASOPHILS # BLD AUTO: 0.05 K/UL — SIGNIFICANT CHANGE UP (ref 0–0.2)
BASOPHILS NFR BLD AUTO: 1.8 % — SIGNIFICANT CHANGE UP (ref 0–2)
BILIRUB SERPL-MCNC: 0.4 MG/DL — SIGNIFICANT CHANGE UP (ref 0.4–2)
BLD GP AB SCN SERPL QL: SIGNIFICANT CHANGE UP
BUN SERPL-MCNC: 10.1 MG/DL — SIGNIFICANT CHANGE UP (ref 8–20)
CALCIUM SERPL-MCNC: 8.2 MG/DL — LOW (ref 8.4–10.5)
CHLORIDE SERPL-SCNC: 106 MMOL/L — SIGNIFICANT CHANGE UP (ref 96–108)
CO2 SERPL-SCNC: 25 MMOL/L — SIGNIFICANT CHANGE UP (ref 22–29)
CREAT SERPL-MCNC: 0.46 MG/DL — LOW (ref 0.5–1.3)
DACRYOCYTES BLD QL SMEAR: SLIGHT — SIGNIFICANT CHANGE UP
EGFR: 126 ML/MIN/1.73M2 — SIGNIFICANT CHANGE UP
EOSINOPHIL # BLD AUTO: 0.07 K/UL — SIGNIFICANT CHANGE UP (ref 0–0.5)
EOSINOPHIL NFR BLD AUTO: 2.6 % — SIGNIFICANT CHANGE UP (ref 0–6)
GIANT PLATELETS BLD QL SMEAR: PRESENT — SIGNIFICANT CHANGE UP
GLUCOSE SERPL-MCNC: 89 MG/DL — SIGNIFICANT CHANGE UP (ref 70–99)
HCG SERPL-ACNC: <4 MIU/ML — SIGNIFICANT CHANGE UP
HCT VFR BLD CALC: 22.5 % — LOW (ref 34.5–45)
HGB BLD-MCNC: 7.1 G/DL — LOW (ref 11.5–15.5)
HOMOCYSTEINE LEVEL: 3.9 UMOL/L — SIGNIFICANT CHANGE UP (ref 0–14.5)
LYMPHOCYTES # BLD AUTO: 0.77 K/UL — LOW (ref 1–3.3)
LYMPHOCYTES # BLD AUTO: 29.8 % — SIGNIFICANT CHANGE UP (ref 13–44)
MACROCYTES BLD QL: SLIGHT — SIGNIFICANT CHANGE UP
MANUAL SMEAR VERIFICATION: SIGNIFICANT CHANGE UP
MCHC RBC-ENTMCNC: 30.6 PG — SIGNIFICANT CHANGE UP (ref 27–34)
MCHC RBC-ENTMCNC: 31.1 GM/DL — LOW (ref 32–36)
MCV RBC AUTO: 98.3 FL — SIGNIFICANT CHANGE UP (ref 80–100)
METHYLMALONATE SERPL-SCNC: 104 NMOL/L — SIGNIFICANT CHANGE UP (ref 0–378)
MONOCYTES # BLD AUTO: 0.18 K/UL — SIGNIFICANT CHANGE UP (ref 0–0.9)
MONOCYTES NFR BLD AUTO: 7 % — SIGNIFICANT CHANGE UP (ref 2–14)
NEUTROPHILS # BLD AUTO: 1.53 K/UL — LOW (ref 1.8–7.4)
NEUTROPHILS NFR BLD AUTO: 58.8 % — SIGNIFICANT CHANGE UP (ref 43–77)
PLAT MORPH BLD: NORMAL — SIGNIFICANT CHANGE UP
PLATELET # BLD AUTO: 200 K/UL — SIGNIFICANT CHANGE UP (ref 150–400)
POIKILOCYTOSIS BLD QL AUTO: SLIGHT — SIGNIFICANT CHANGE UP
POLYCHROMASIA BLD QL SMEAR: SLIGHT — SIGNIFICANT CHANGE UP
POTASSIUM SERPL-MCNC: 4.2 MMOL/L — SIGNIFICANT CHANGE UP (ref 3.5–5.3)
POTASSIUM SERPL-SCNC: 4.2 MMOL/L — SIGNIFICANT CHANGE UP (ref 3.5–5.3)
PROT SERPL-MCNC: 4.8 G/DL — LOW (ref 6.6–8.7)
RBC # BLD: 2.29 M/UL — LOW (ref 3.8–5.2)
RBC # FLD: 20.9 % — HIGH (ref 10.3–14.5)
RBC BLD AUTO: ABNORMAL
SODIUM SERPL-SCNC: 139 MMOL/L — SIGNIFICANT CHANGE UP (ref 135–145)
WBC # BLD: 2.6 K/UL — LOW (ref 3.8–10.5)
WBC # FLD AUTO: 2.6 K/UL — LOW (ref 3.8–10.5)

## 2024-07-08 PROCEDURE — 99223 1ST HOSP IP/OBS HIGH 75: CPT

## 2024-07-08 RX ORDER — HYDROMORPHONE HCL 0.2 MG/ML
1 INJECTION, SOLUTION INTRAVENOUS ONCE
Refills: 0 | Status: DISCONTINUED | OUTPATIENT
Start: 2024-07-08 | End: 2024-07-08

## 2024-07-08 RX ORDER — HYDROMORPHONE HCL 0.2 MG/ML
1 INJECTION, SOLUTION INTRAVENOUS
Refills: 0 | Status: DISCONTINUED | OUTPATIENT
Start: 2024-07-08 | End: 2024-07-09

## 2024-07-08 RX ORDER — FLUOXETINE HCL 40 MG
80 CAPSULE ORAL ONCE
Refills: 0 | Status: COMPLETED | OUTPATIENT
Start: 2024-07-08 | End: 2024-07-08

## 2024-07-08 RX ORDER — MEMANTINE HYDROCHLORIDE 7 MG/1
10 CAPSULE, EXTENDED RELEASE ORAL ONCE
Refills: 0 | Status: COMPLETED | OUTPATIENT
Start: 2024-07-08 | End: 2024-07-08

## 2024-07-08 RX ADMIN — HYDROMORPHONE HCL 1 MILLIGRAM(S): 0.2 INJECTION, SOLUTION INTRAVENOUS at 20:49

## 2024-07-08 RX ADMIN — HYDROMORPHONE HCL 1 MILLIGRAM(S): 0.2 INJECTION, SOLUTION INTRAVENOUS at 21:00

## 2024-07-08 RX ADMIN — HYDROMORPHONE HCL 1 MILLIGRAM(S): 0.2 INJECTION, SOLUTION INTRAVENOUS at 23:30

## 2024-07-08 RX ADMIN — HYDROMORPHONE HCL 1 MILLIGRAM(S): 0.2 INJECTION, SOLUTION INTRAVENOUS at 23:06

## 2024-07-08 RX ADMIN — HYDROMORPHONE HCL 1 MILLIGRAM(S): 0.2 INJECTION, SOLUTION INTRAVENOUS at 17:26

## 2024-07-08 RX ADMIN — HYDROMORPHONE HCL 1 MILLIGRAM(S): 0.2 INJECTION, SOLUTION INTRAVENOUS at 19:18

## 2024-07-09 VITALS
OXYGEN SATURATION: 100 % | RESPIRATION RATE: 16 BRPM | TEMPERATURE: 98 F | HEART RATE: 59 BPM | DIASTOLIC BLOOD PRESSURE: 64 MMHG | SYSTOLIC BLOOD PRESSURE: 98 MMHG

## 2024-07-09 PROCEDURE — 80053 COMPREHEN METABOLIC PANEL: CPT

## 2024-07-09 PROCEDURE — 84702 CHORIONIC GONADOTROPIN TEST: CPT

## 2024-07-09 PROCEDURE — 86922 COMPATIBILITY TEST ANTIGLOB: CPT

## 2024-07-09 PROCEDURE — 86900 BLOOD TYPING SEROLOGIC ABO: CPT

## 2024-07-09 PROCEDURE — 99284 EMERGENCY DEPT VISIT MOD MDM: CPT | Mod: 25

## 2024-07-09 PROCEDURE — P9040: CPT

## 2024-07-09 PROCEDURE — 86901 BLOOD TYPING SEROLOGIC RH(D): CPT

## 2024-07-09 PROCEDURE — G0378: CPT

## 2024-07-09 PROCEDURE — 36415 COLL VENOUS BLD VENIPUNCTURE: CPT

## 2024-07-09 PROCEDURE — 96376 TX/PRO/DX INJ SAME DRUG ADON: CPT

## 2024-07-09 PROCEDURE — 86850 RBC ANTIBODY SCREEN: CPT

## 2024-07-09 PROCEDURE — 36430 TRANSFUSION BLD/BLD COMPNT: CPT

## 2024-07-09 PROCEDURE — 96374 THER/PROPH/DIAG INJ IV PUSH: CPT

## 2024-07-09 PROCEDURE — 85025 COMPLETE CBC W/AUTO DIFF WBC: CPT

## 2024-07-09 RX ADMIN — HYDROMORPHONE HCL 1 MILLIGRAM(S): 0.2 INJECTION, SOLUTION INTRAVENOUS at 02:00

## 2024-07-09 RX ADMIN — HYDROMORPHONE HCL 1 MILLIGRAM(S): 0.2 INJECTION, SOLUTION INTRAVENOUS at 01:27

## 2024-07-22 RX ORDER — PREGABALIN 50 MG/1
1 CAPSULE ORAL
Refills: 0 | DISCHARGE

## 2024-07-22 RX ORDER — FLUOXETINE HCL 40 MG
1 CAPSULE ORAL
Refills: 0 | DISCHARGE

## 2024-07-22 RX ORDER — DENOSUMAB 120 MG/1.7ML
70 INJECTION SUBCUTANEOUS
Refills: 0 | DISCHARGE

## 2024-07-22 RX ORDER — DIAZEPAM 10 MG/1
1 TABLET ORAL
Refills: 0 | DISCHARGE

## 2024-07-25 ENCOUNTER — TRANSCRIPTION ENCOUNTER (OUTPATIENT)
Age: 39
End: 2024-07-25

## 2024-07-29 ENCOUNTER — INPATIENT (INPATIENT)
Facility: HOSPITAL | Age: 39
LOS: 8 days | Discharge: HOME | DRG: 378 | End: 2024-08-07
Attending: STUDENT IN AN ORGANIZED HEALTH CARE EDUCATION/TRAINING PROGRAM | Admitting: FAMILY MEDICINE
Payer: MEDICARE

## 2024-07-29 VITALS
HEART RATE: 83 BPM | DIASTOLIC BLOOD PRESSURE: 69 MMHG | OXYGEN SATURATION: 99 % | RESPIRATION RATE: 14 BRPM | WEIGHT: 150.58 LBS | HEIGHT: 62 IN | TEMPERATURE: 99 F | SYSTOLIC BLOOD PRESSURE: 110 MMHG

## 2024-07-29 DIAGNOSIS — Z90.89 ACQUIRED ABSENCE OF OTHER ORGANS: Chronic | ICD-10-CM

## 2024-07-29 DIAGNOSIS — Z98.890 OTHER SPECIFIED POSTPROCEDURAL STATES: Chronic | ICD-10-CM

## 2024-07-29 LAB
ALBUMIN SERPL ELPH-MCNC: 2.4 G/DL — LOW (ref 3.3–5.2)
ALP SERPL-CCNC: 184 U/L — HIGH (ref 40–120)
ALT FLD-CCNC: 28 U/L — SIGNIFICANT CHANGE UP
ANION GAP SERPL CALC-SCNC: 5 MMOL/L — SIGNIFICANT CHANGE UP (ref 5–17)
AST SERPL-CCNC: 58 U/L — HIGH
BILIRUB SERPL-MCNC: 0.4 MG/DL — SIGNIFICANT CHANGE UP (ref 0.4–2)
BLD GP AB SCN SERPL QL: SIGNIFICANT CHANGE UP
BUN SERPL-MCNC: 8.8 MG/DL — SIGNIFICANT CHANGE UP (ref 8–20)
CALCIUM SERPL-MCNC: 7.2 MG/DL — LOW (ref 8.4–10.5)
CHLORIDE SERPL-SCNC: 108 MMOL/L — SIGNIFICANT CHANGE UP (ref 96–108)
CO2 SERPL-SCNC: 26 MMOL/L — SIGNIFICANT CHANGE UP (ref 22–29)
CREAT SERPL-MCNC: 0.36 MG/DL — LOW (ref 0.5–1.3)
EGFR: 134 ML/MIN/1.73M2 — SIGNIFICANT CHANGE UP
GLUCOSE SERPL-MCNC: 116 MG/DL — HIGH (ref 70–99)
HCT VFR BLD CALC: 16.9 % — CRITICAL LOW (ref 34.5–45)
HGB BLD-MCNC: 5.1 G/DL — CRITICAL LOW (ref 11.5–15.5)
INR BLD: 1.17 RATIO — SIGNIFICANT CHANGE UP (ref 0.85–1.18)
MCHC RBC-ENTMCNC: 30.2 GM/DL — LOW (ref 32–36)
MCHC RBC-ENTMCNC: 30.5 PG — SIGNIFICANT CHANGE UP (ref 27–34)
MCV RBC AUTO: 101.2 FL — HIGH (ref 80–100)
PLATELET # BLD AUTO: 128 K/UL — LOW (ref 150–400)
POTASSIUM SERPL-MCNC: 3.8 MMOL/L — SIGNIFICANT CHANGE UP (ref 3.5–5.3)
POTASSIUM SERPL-SCNC: 3.8 MMOL/L — SIGNIFICANT CHANGE UP (ref 3.5–5.3)
PROT SERPL-MCNC: 4.1 G/DL — LOW (ref 6.6–8.7)
PROTHROM AB SERPL-ACNC: 12.9 SEC — SIGNIFICANT CHANGE UP (ref 9.5–13)
RBC # BLD: 1.67 M/UL — LOW (ref 3.8–5.2)
RBC # FLD: 18.3 % — HIGH (ref 10.3–14.5)
SODIUM SERPL-SCNC: 139 MMOL/L — SIGNIFICANT CHANGE UP (ref 135–145)
WBC # BLD: 2.77 K/UL — LOW (ref 3.8–10.5)
WBC # FLD AUTO: 2.77 K/UL — LOW (ref 3.8–10.5)

## 2024-07-29 PROCEDURE — 99223 1ST HOSP IP/OBS HIGH 75: CPT

## 2024-07-29 RX ORDER — HYDROMORPHONE HCL IN 0.9% NACL 0.2 MG/ML
2 PLASTIC BAG, INJECTION (ML) INTRAVENOUS
Refills: 0 | Status: DISCONTINUED | OUTPATIENT
Start: 2024-07-29 | End: 2024-07-30

## 2024-07-29 RX ORDER — HYDROMORPHONE HCL IN 0.9% NACL 0.2 MG/ML
2 PLASTIC BAG, INJECTION (ML) INTRAVENOUS ONCE
Refills: 0 | Status: DISCONTINUED | OUTPATIENT
Start: 2024-07-29 | End: 2024-07-29

## 2024-07-29 RX ADMIN — Medication 2 MILLIGRAM(S): at 16:24

## 2024-07-29 RX ADMIN — Medication 2 MILLIGRAM(S): at 21:30

## 2024-07-29 NOTE — ED PROVIDER NOTE - PHYSICAL EXAMINATION
Chief Complaint   Patient presents with     Follow Up For     PPM- CHB-s/p RVOT ablation, here to establish care. was seen in pt and Carmen Fowler NP     Vitals were taken and medications were reconciled.     Shonna Noguera MA    2:33 PM         Gen: NAD, pallor  Head: NC/AT  Neck: trachea midline  Card: regular rate and rhythm. Mild ankle edema bilaterally.  Resp:  CTAB  Abd: soft, non-distended, non-tender  Ext: no deformities above reported baseline  Neuro:  A&O, no motor or sensory deficits above reported baseline  Skin:  Warm and dry as visualized  Psych:  Normal affect and mood

## 2024-07-29 NOTE — ED CDU PROVIDER INITIAL DAY NOTE - OBJECTIVE STATEMENT
This is a 38 year old female with stage 4 breast cancer with metastatic disease to brain s/p immunotherapy, RT, on active chemotherapy (last dose 6/2024), chronic iron deficiency anemia due to gastric antral vascular ectasia (GAVE), recently admitted to [facility] for acute on chronic anemia s/p EGD on 7/3 without acute pathology found, presenting with continuous chest pain associated with exertional dyspnea and intermittent productive cough . Etiology of chest pain was unclear. Cardiac work up pursued. troponin checked and found to be elevated. possibly 2/2 to chemo or demand ischemia. ECG negative for ischemic changes. Cardiology consulted, recommended TTE which was without wall motion abnormalities and showed normal EF. CT chest obtained showing similar findings to prior but some concern for PNA thus pt started on antibiotics which she tolerated well. Due to borderline chronic anemia pt received 1 unit of prbc for optimization of her blood status. she tolerated it well and hgb count remained stable afterwards. Pain management consulted to assist with chronic pain and pt started on PCA pump. Recs to treansition to her home regimen advised given low use of her PCA pump here. Hospitalization complicated by increased lethargy and pt found to have elevated ammonia level. Lactulose was started with improvement on symptoms. Patient discharged 5 days ago and was found to have low hemoglobin and sent in for transfusion.  Patient denies any reactions to past transfusions, denies any rectal bleeding.  Was told in past medications she is on i This is a 38 year old female with stage 4 breast cancer with metastatic disease to brain s/p immunotherapy, RT, on active chemotherapy (last dose 6/2024), chronic iron deficiency anemia due to gastric antral vascular ectasia (GAVE), recently admitted to [facility] for acute on chronic anemia s/p EGD on 7/3 without acute pathology found, presenting with low hemoglobin and sent in for transfusion.  Patient denies any reactions to past transfusions, denies any rectal bleeding.  Was told in past medications she is on may affect her blood levels. No

## 2024-07-29 NOTE — ED ADULT NURSE NOTE - OBJECTIVE STATEMENT
pt reports to the ED with c.o of hbg of 5.7 with out patient labs from oncologist. pt has hx of stage 4 breast CA with mets to brain.  states she has not been getting chemo because of these hbg level so low and has been requiring transfusions. pt appears in NAD, denies sob, cp, cough, fever or chill.s

## 2024-07-29 NOTE — ED PROVIDER NOTE - IV ALTEPLASE EXCL ABS HIDDEN
show
I have bad throat pain and a lot of mucus and cough, I have a lot of chills. I took two Tylenol, now I feel better, I want antibiotics for the green mucus - patient

## 2024-07-29 NOTE — ED PROVIDER NOTE - OBJECTIVE STATEMENT
38 year old female with pmhx of stage 4 breast cancer with mets to the brain presents from oncologist for hemoglobin of 5.7. Patient states that she also feels more bloated in the past week and has gained 12 pounds in 10 days. She also reports diarrhea for the past week. Denies any blood in the stool, fever, chest pain, shortness of breath, or any other symptoms at this time. 38 year old female with pmhx of stage 4 breast cancer with mets to the brain presents from oncologist for hemoglobin of 5.7. Patient has not gotten chemotherapy in about 3 months because her hemoglobin has been consistently low and requiring transfusions. Patient states that she also feels more bloated in the past week and has gained 12 pounds in 10 days. She also reports diarrhea for the past week. Denies any blood in the stool, fever, chest pain, shortness of breath, or any other symptoms at this time.

## 2024-07-29 NOTE — ED ADULT NURSE NOTE - NSFALLRISKFACTORS_ED_ALL_ED
Subjective:       Patient ID: Hesham Palacios is a 28 y.o. female.    Chief Complaint:  Vaginal Discharge      History of Present Illness  HPI  Vaginal discharge with odor    Health Maintenance   Topic Date Due    Lipid Panel  1991    Influenza Vaccine  2018    Pap Smear  2021    TETANUS VACCINE  10/09/2027     GYN & OB History  Patient's last menstrual period was 01/10/2019.   Date of Last Pap: 2018    OB History    Para Term  AB Living   3 3 3 0 0 3   SAB TAB Ectopic Multiple Live Births   0 0 0 0 3      # Outcome Date GA Lbr Virgilio/2nd Weight Sex Delivery Anes PTL Lv   3 Term 18 39w3d  4.52 kg (9 lb 15.4 oz) M CS-LTranv Spinal N DANIEL   2 Term 16 41w1d  3.94 kg (8 lb 11 oz) M CS-LTranv EPI N DANIEL   1 Term 11/23/10 40w0d  3.771 kg (8 lb 5 oz) M CS-Unspec   DANIEL      Complications: Preeclampsia          Review of Systems  Review of Systems        Objective:   Physical Exam:               Genitourinary: There is no rash, tenderness, lesion or injury on the right labia. There is no rash, tenderness, lesion or injury on the left labia. No tenderness or bleeding in the vagina. No foreign body in the vagina. No signs of injury around the vagina. Vaginal discharge found. Labial bartholins normal.  Genitourinary Comments: WET PREP:  Few Clue Cells.  No lactobacilli                       Assessment:        1. BV (bacterial vaginosis)                Plan:            Hesham was seen today for vaginal discharge.    Diagnoses and all orders for this visit:    BV (bacterial vaginosis)  Comments:  Add Probiotics daily for 6 months   Orders:  -     metroNIDAZOLE (METROGEL) 0.75 % vaginal gel; Place 1 applicator vaginally once daily. for 5 days       Other

## 2024-07-29 NOTE — ED PROVIDER NOTE - ATTENDING CONTRIBUTION TO CARE
Pt states that she was recently admitted here for anemia. Pt was discharged and followed up with her hematologist today and was found to be anemic again and sent in for another xfusion. Pt co chronic L rib pain as well as leg swelling. Pt had DVT/PE workup last time she was here and workup was neg. Pt received a dose of lasix last time shew as admitted.    physical - rrr. ctab. abd - soft, nt. mild B/L LE edema. nor ramya.    plan - Pt with recurrent anemia. will put in obs for xfusion/

## 2024-07-29 NOTE — ED CDU PROVIDER INITIAL DAY NOTE - ATTENDING APP SHARED VISIT CONTRIBUTION OF CARE
38F in obs for blood transfusion per ED provider 2/2 anemia of chronic disease, sent in from outpt setting, will transfuse, anemia is fairly significant so will repeat labs / keep on monitor in interim, consider advancing anemia workup if not improving appropriately

## 2024-07-30 DIAGNOSIS — D64.9 ANEMIA, UNSPECIFIED: ICD-10-CM

## 2024-07-30 DIAGNOSIS — D63.8 ANEMIA IN OTHER CHRONIC DISEASES CLASSIFIED ELSEWHERE: ICD-10-CM

## 2024-07-30 LAB
ALBUMIN SERPL ELPH-MCNC: 2.5 G/DL — LOW (ref 3.3–5.2)
ALP SERPL-CCNC: 216 U/L — HIGH (ref 40–120)
ALT FLD-CCNC: 33 U/L — HIGH
AMMONIA BLD-MCNC: 81 UMOL/L — HIGH (ref 11–55)
ANION GAP SERPL CALC-SCNC: 10 MMOL/L — SIGNIFICANT CHANGE UP (ref 5–17)
AST SERPL-CCNC: 79 U/L — HIGH
BASOPHILS # BLD AUTO: 0 K/UL — SIGNIFICANT CHANGE UP (ref 0–0.2)
BASOPHILS NFR BLD AUTO: 0 % — SIGNIFICANT CHANGE UP (ref 0–2)
BILIRUB SERPL-MCNC: 1.5 MG/DL — SIGNIFICANT CHANGE UP (ref 0.4–2)
BUN SERPL-MCNC: 7.2 MG/DL — LOW (ref 8–20)
CALCIUM SERPL-MCNC: 7.6 MG/DL — LOW (ref 8.4–10.5)
CHLORIDE SERPL-SCNC: 105 MMOL/L — SIGNIFICANT CHANGE UP (ref 96–108)
CO2 SERPL-SCNC: 25 MMOL/L — SIGNIFICANT CHANGE UP (ref 22–29)
CREAT SERPL-MCNC: 0.34 MG/DL — LOW (ref 0.5–1.3)
EGFR: 136 ML/MIN/1.73M2 — SIGNIFICANT CHANGE UP
EOSINOPHIL # BLD AUTO: 0.16 K/UL — SIGNIFICANT CHANGE UP (ref 0–0.5)
EOSINOPHIL NFR BLD AUTO: 4.6 % — SIGNIFICANT CHANGE UP (ref 0–6)
FOLATE SERPL-MCNC: 11.1 NG/ML — SIGNIFICANT CHANGE UP
GLUCOSE SERPL-MCNC: 125 MG/DL — HIGH (ref 70–99)
HCG SERPL-ACNC: <4 MIU/ML — SIGNIFICANT CHANGE UP
HCT VFR BLD CALC: 30.4 % — LOW (ref 34.5–45)
HGB BLD-MCNC: 10 G/DL — LOW (ref 11.5–15.5)
IMM GRANULOCYTES NFR BLD AUTO: 0.6 % — SIGNIFICANT CHANGE UP (ref 0–0.9)
LYMPHOCYTES # BLD AUTO: 0.7 K/UL — LOW (ref 1–3.3)
LYMPHOCYTES # BLD AUTO: 20 % — SIGNIFICANT CHANGE UP (ref 13–44)
MCHC RBC-ENTMCNC: 30.5 PG — SIGNIFICANT CHANGE UP (ref 27–34)
MCHC RBC-ENTMCNC: 32.9 GM/DL — SIGNIFICANT CHANGE UP (ref 32–36)
MCV RBC AUTO: 92.7 FL — SIGNIFICANT CHANGE UP (ref 80–100)
MONOCYTES # BLD AUTO: 0.41 K/UL — SIGNIFICANT CHANGE UP (ref 0–0.9)
MONOCYTES NFR BLD AUTO: 11.7 % — SIGNIFICANT CHANGE UP (ref 2–14)
NEUTROPHILS # BLD AUTO: 2.21 K/UL — SIGNIFICANT CHANGE UP (ref 1.8–7.4)
NEUTROPHILS NFR BLD AUTO: 63.1 % — SIGNIFICANT CHANGE UP (ref 43–77)
PLATELET # BLD AUTO: 132 K/UL — LOW (ref 150–400)
POTASSIUM SERPL-MCNC: 5 MMOL/L — SIGNIFICANT CHANGE UP (ref 3.5–5.3)
POTASSIUM SERPL-SCNC: 5 MMOL/L — SIGNIFICANT CHANGE UP (ref 3.5–5.3)
PROT SERPL-MCNC: 4.7 G/DL — LOW (ref 6.6–8.7)
RBC # BLD: 3.28 M/UL — LOW (ref 3.8–5.2)
RBC # FLD: 18.6 % — HIGH (ref 10.3–14.5)
SODIUM SERPL-SCNC: 139 MMOL/L — SIGNIFICANT CHANGE UP (ref 135–145)
VIT B12 SERPL-MCNC: 679 PG/ML — SIGNIFICANT CHANGE UP (ref 232–1245)
WBC # BLD: 3.5 K/UL — LOW (ref 3.8–10.5)
WBC # FLD AUTO: 3.5 K/UL — LOW (ref 3.8–10.5)

## 2024-07-30 PROCEDURE — 74177 CT ABD & PELVIS W/CONTRAST: CPT | Mod: 26

## 2024-07-30 PROCEDURE — 99233 SBSQ HOSP IP/OBS HIGH 50: CPT

## 2024-07-30 PROCEDURE — 99222 1ST HOSP IP/OBS MODERATE 55: CPT

## 2024-07-30 PROCEDURE — 99223 1ST HOSP IP/OBS HIGH 75: CPT

## 2024-07-30 RX ORDER — HYDROMORPHONE HCL IN 0.9% NACL 0.2 MG/ML
2 PLASTIC BAG, INJECTION (ML) INTRAVENOUS ONCE
Refills: 0 | Status: DISCONTINUED | OUTPATIENT
Start: 2024-07-30 | End: 2024-07-30

## 2024-07-30 RX ORDER — METHADONE HCL 10 MG
10 TABLET ORAL
Refills: 0 | Status: DISCONTINUED | OUTPATIENT
Start: 2024-07-30 | End: 2024-08-06

## 2024-07-30 RX ORDER — OCTREOTIDE ACETATE 200 UG/ML
100 INJECTION INTRAVENOUS
Refills: 0 | Status: DISCONTINUED | OUTPATIENT
Start: 2024-07-30 | End: 2024-08-07

## 2024-07-30 RX ORDER — HEPARIN SODIUM,PORCINE 10 UNIT/ML
300 VIAL (ML) INTRAVENOUS ONCE
Refills: 0 | Status: COMPLETED | OUTPATIENT
Start: 2024-07-30 | End: 2024-08-07

## 2024-07-30 RX ORDER — ONDANSETRON HCL/PF 4 MG/2 ML
4 VIAL (ML) INJECTION EVERY 6 HOURS
Refills: 0 | Status: DISCONTINUED | OUTPATIENT
Start: 2024-07-30 | End: 2024-08-07

## 2024-07-30 RX ORDER — METHADONE HCL 10 MG
15 TABLET ORAL AT BEDTIME
Refills: 0 | Status: DISCONTINUED | OUTPATIENT
Start: 2024-07-30 | End: 2024-08-06

## 2024-07-30 RX ORDER — LORATADINE 10 MG
17 TABLET,DISINTEGRATING ORAL DAILY
Refills: 0 | Status: DISCONTINUED | OUTPATIENT
Start: 2024-07-30 | End: 2024-07-30

## 2024-07-30 RX ORDER — MEMANTINE HYDROCHLORIDE 14 MG/1
10 CAPSULE, EXTENDED RELEASE ORAL
Refills: 0 | Status: DISCONTINUED | OUTPATIENT
Start: 2024-07-30 | End: 2024-08-07

## 2024-07-30 RX ORDER — HYDROMORPHONE HCL IN 0.9% NACL 0.2 MG/ML
0.5 PLASTIC BAG, INJECTION (ML) INTRAVENOUS ONCE
Refills: 0 | Status: DISCONTINUED | OUTPATIENT
Start: 2024-07-30 | End: 2024-07-30

## 2024-07-30 RX ORDER — HYDROMORPHONE HCL IN 0.9% NACL 0.2 MG/ML
2 PLASTIC BAG, INJECTION (ML) INTRAVENOUS EVERY 4 HOURS
Refills: 0 | Status: DISCONTINUED | OUTPATIENT
Start: 2024-07-30 | End: 2024-08-03

## 2024-07-30 RX ORDER — METHYLPHENIDATE HYDROCHLORIDE 18 MG/1
20 TABLET, EXTENDED RELEASE ORAL
Refills: 0 | Status: COMPLETED | OUTPATIENT
Start: 2024-07-30 | End: 2024-08-06

## 2024-07-30 RX ORDER — DIAZEPAM 5 MG/ML
2 VIAL (ML) INJECTION
Refills: 0 | Status: DISCONTINUED | OUTPATIENT
Start: 2024-07-30 | End: 2024-08-06

## 2024-07-30 RX ORDER — SENNOSIDES 8.6 MG/1
2 TABLET ORAL AT BEDTIME
Refills: 0 | Status: DISCONTINUED | OUTPATIENT
Start: 2024-07-30 | End: 2024-07-30

## 2024-07-30 RX ORDER — ACETAMINOPHEN 500 MG
650 TABLET ORAL EVERY 6 HOURS
Refills: 0 | Status: DISCONTINUED | OUTPATIENT
Start: 2024-07-30 | End: 2024-08-07

## 2024-07-30 RX ORDER — PANTOPRAZOLE SODIUM 20 MG/1
40 TABLET, DELAYED RELEASE ORAL
Refills: 0 | Status: DISCONTINUED | OUTPATIENT
Start: 2024-07-30 | End: 2024-07-31

## 2024-07-30 RX ORDER — HYDROMORPHONE HCL IN 0.9% NACL 0.2 MG/ML
2 PLASTIC BAG, INJECTION (ML) INTRAVENOUS
Refills: 0 | Status: DISCONTINUED | OUTPATIENT
Start: 2024-07-30 | End: 2024-07-31

## 2024-07-30 RX ORDER — LACTULOSE 10 G/15 ML
20 SOLUTION, ORAL ORAL THREE TIMES A DAY
Refills: 0 | Status: DISCONTINUED | OUTPATIENT
Start: 2024-07-30 | End: 2024-08-07

## 2024-07-30 RX ORDER — FLUOXETINE HCL 10 MG
80 CAPSULE ORAL AT BEDTIME
Refills: 0 | Status: DISCONTINUED | OUTPATIENT
Start: 2024-07-30 | End: 2024-08-07

## 2024-07-30 RX ORDER — HYDROMORPHONE HCL IN 0.9% NACL 0.2 MG/ML
4 PLASTIC BAG, INJECTION (ML) INTRAVENOUS EVERY 4 HOURS
Refills: 0 | Status: DISCONTINUED | OUTPATIENT
Start: 2024-07-30 | End: 2024-07-30

## 2024-07-30 RX ADMIN — Medication 2 MILLIGRAM(S): at 02:30

## 2024-07-30 RX ADMIN — Medication 200 MILLIGRAM(S): at 22:12

## 2024-07-30 RX ADMIN — Medication 2.5 MILLIGRAM(S): at 22:11

## 2024-07-30 RX ADMIN — Medication 2 MILLIGRAM(S): at 23:13

## 2024-07-30 RX ADMIN — MEMANTINE HYDROCHLORIDE 10 MILLIGRAM(S): 14 CAPSULE, EXTENDED RELEASE ORAL at 17:44

## 2024-07-30 RX ADMIN — Medication 20 GRAM(S): at 13:19

## 2024-07-30 RX ADMIN — Medication 2 MILLIGRAM(S): at 08:05

## 2024-07-30 RX ADMIN — Medication 4 MILLIGRAM(S): at 17:44

## 2024-07-30 RX ADMIN — Medication 2 MILLIGRAM(S): at 01:33

## 2024-07-30 RX ADMIN — Medication 2 MILLIGRAM(S): at 13:05

## 2024-07-30 RX ADMIN — Medication 80 MILLIGRAM(S): at 22:11

## 2024-07-30 RX ADMIN — Medication 20 GRAM(S): at 22:21

## 2024-07-30 RX ADMIN — Medication 200 MILLIGRAM(S): at 13:19

## 2024-07-30 RX ADMIN — Medication 17 GRAM(S): at 11:51

## 2024-07-30 RX ADMIN — METHYLPHENIDATE HYDROCHLORIDE 20 MILLIGRAM(S): 18 TABLET, EXTENDED RELEASE ORAL at 12:35

## 2024-07-30 RX ADMIN — Medication 0.5 MILLIGRAM(S): at 09:18

## 2024-07-30 RX ADMIN — OCTREOTIDE ACETATE 100 MICROGRAM(S): 200 INJECTION INTRAVENOUS at 18:16

## 2024-07-30 RX ADMIN — Medication 4 MILLIGRAM(S): at 12:35

## 2024-07-30 RX ADMIN — Medication 2 MILLIGRAM(S): at 22:13

## 2024-07-30 RX ADMIN — Medication 4 MILLIGRAM(S): at 18:14

## 2024-07-30 RX ADMIN — Medication 2 MILLIGRAM(S): at 12:35

## 2024-07-30 RX ADMIN — Medication 10 MILLIGRAM(S): at 13:19

## 2024-07-30 RX ADMIN — Medication 15 MILLIGRAM(S): at 22:12

## 2024-07-30 NOTE — H&P ADULT - NSHPLABSRESULTS_GEN_ALL_CORE
LABS:                        10.0   3.50  )-----------( 132      ( 30 Jul 2024 07:53 )             30.4     07-30    139  |  105  |  7.2<L>  ----------------------------<  125<H>  5.0   |  25.0  |  0.34<L>    Ca    7.6<L>      30 Jul 2024 10:50    TPro  4.7<L>  /  Alb  2.5<L>  /  TBili  1.5  /  DBili  x   /  AST  79<H>  /  ALT  33<H>  /  AlkPhos  216<H>  07-30    PT/INR - ( 29 Jul 2024 14:10 )   PT: 12.9 sec;   INR: 1.17 ratio

## 2024-07-30 NOTE — CONSULT NOTE ADULT - ATTENDING COMMENTS
I evaluated this pt. with my ACP and agree with the above assessment and management plan, Pt with recent negative colonoscopy roughly 6 months ago and EGD done here 07/03/24 which showed previously seen GAVE had resolved but she now has " red spots " in stomach and duodenum with black stools now. Will repeat EGD tomorrow. Transfuse to Hb of 8 grams or higher. NPO after MN tonight. Pt can eat today. Repeat labs ordered for the AM. I reviewed her recent EGD report from 07/03/24. I evaluated this pt. with my ACP and agree with the above assessment and management plan, Pt with recent negative colonoscopy roughly 6 months ago and EGD done here 07/03/24 which showed previously seen GAVE had resolved but she now has " red spots " in stomach and duodenum with black stools now. Will repeat EGD tomorrow. Transfuse to Hb of 7 grams or higher. NPO after MN tonight. Pt can eat today. Repeat labs ordered for the AM. I reviewed her recent EGD report from 07/03/24.

## 2024-07-30 NOTE — H&P ADULT - ASSESSMENT
INCOMPLETE 38 year old female with history of Stage 4 Breast Cancer s/p Taxotere / Herceptin with Brain Mets s/p WBRT now on Enhertu (Last Dose on 6/19), Chronic Iron Deficiency Anemia likely due to Gastric Antral Vascular Ectasia (GAVE),  Pericardial Effusion, Spinal Compression Fractures and recent hospitalizations (last one 10 days ago, discharged on 7/25/24) sent by Dr. Chapa (Hem / Onc) for anemia. As per patient, after being discharged from the hospital -- she was doing OK however she was feeling bloated and very winded. Her stools are loose due to stool softeners and lately she noticed stool color is dark. She went for blood work and was found to have Hb of 5.7 so she was sent to [facility].  In ER, H&H 5.1/16.9, she was given 3 PRBCs. Repeat H&H 10/30.4. Dr. Chapa recommended CT Abd / Pelvis and GI Evaluation and admission for close monitoring for 48 hours.     1) Acute on Chronic Anemia  - History of GAVE  - EGD on 7/3: There were many, minute, diffusely scattered red spots in the antrum. Previously seen GAVE improved/resolved. Scattered stellate scars seen consistent with healing. Moderately erythematous mucosa consistent with gastritis. There were many, minute, diffusely scattered red spots throughout the first and second portions of the duodenum. No source of anemia or possible dark stools seen on upper endoscopy. Source of scant red blood seen is likely from known hemorrhoids. Profound anemia is unlikely to be attributable to GI losses.   - She is also on Enhertu, which can cause anemia  - CT Abd / Pelvis is pending   - GI was consulted by ED  - s/p 3 PRBCs  - Gets Venofer and Aranesp as an outpatient  - Monitor H&H  - Hem/Onc following     2) Metastatic Breast Cancer   - On Enhertu (last dose 6/19)  - Hem / Onc following  - Outpatient follow up with Rad / Onc  - Pain control     3) Thrombocytopenia  - Chronic and intermittent   - Likely due to Enhertu  - Monitor     DVT Prophylaxis --     INCOMPLETE 38 year old female with history of Stage 4 Breast Cancer s/p Taxotere / Herceptin with Brain Mets s/p WBRT now on Enhertu (Last Dose on 6/19), Chronic Iron Deficiency Anemia likely due to Gastric Antral Vascular Ectasia (GAVE),  Pericardial Effusion, Spinal Compression Fractures and recent hospitalizations (last one 10 days ago, discharged on 7/25/24) sent by Dr. Chapa (Hem / Onc) for anemia. As per patient, after being discharged from the hospital -- she was doing OK however she was feeling bloated and very winded. Her stools are loose due to stool softeners and lately she noticed stool color is dark. She went for blood work and was found to have Hb of 5.7 so she was sent to [facility].  In ER, H&H 5.1/16.9, she was given 3 PRBCs. Repeat H&H 10/30.4. Dr. Chapa recommended CT Abd / Pelvis and GI Evaluation and admission for close monitoring for 48 hours.     1) Acute on Chronic Anemia  - History of GAVE  - EGD on 7/3: There were many, minute, diffusely scattered red spots in the antrum. Previously seen GAVE improved/resolved. Scattered stellate scars seen consistent with healing. Moderately erythematous mucosa consistent with gastritis. There were many, minute, diffusely scattered red spots throughout the first and second portions of the duodenum. No source of anemia or possible dark stools seen on upper endoscopy. Source of scant red blood seen is likely from known hemorrhoids. Profound anemia is unlikely to be attributable to GI losses.   - She is also on Enhertu, which can cause anemia  - CT Abd / Pelvis is pending   - GI was consulted by ED  - s/p 3 PRBCs  - Gets Venofer and Aranesp as an outpatient  - Monitor H&H  - Hem/Onc following     2) Metastatic Breast Cancer   - On Enhertu (last dose 6/19)  - Hem / Onc following  - Outpatient follow up with Rad / Onc  - Pain control     3) Thrombocytopenia  - Chronic and intermittent   - Likely due to Enhertu  - Monitor     4) Anxiety / Mood Disorder  - Continue Fluoxetine, Olanzapine and Diazepam (PRN)    DVT Prophylaxis -- Venodyne     Dispo: Home once stable.

## 2024-07-30 NOTE — H&P ADULT - HISTORY OF PRESENT ILLNESS
INCOMPLETE 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),  Pericardial Effusion, Spinal Compression Fractures and recent hospitalizations (last one 10 days ago, discharged on 7/25/24) sent by Dr. Chapa (Hem / Onc) for anemia. As per patient, after being discharged from the hospital -- she was doing OK however she was feeling bloated and very winded. Her stools are loose due to stool softeners and lately she noticed stool color is dark. She went for blood work and was found to have Hb of 5.7 so she was sent to [facility].  In ER, H&H 5.1/16.9, she was given 3 PRBCs. Repeat H&H 10/30.4. Dr. Chapa recommended CT Abd / Pelvis and GI Evaluation and admission for close monitoring for 48 hours.

## 2024-07-30 NOTE — PROGRESS NOTE ADULT - SUBJECTIVE AND OBJECTIVE BOX
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 On Octerotide LD 7/18 and iv iron LD weekly LD 7/18 including prior w/u with GI (noted to have telangiectasias on EGD / GAVE) , Sent form the office for a 10 lb weight gain anf hgb of 5.7 Placed in obs Hgb 5.1 S/P 3 U PRNC hgb now 10    PAST MEDICAL & SURGICAL HISTORY:  H/O compression fracture of spine      Anxiety      Metastatic breast cancer      H/O pleural effusion      Pericardial effusion      S/P tonsillectomy      H/O chest tube placement  12/23/21      S/P pericardiocentesis  12/28/21      Allergies  pertuzumab (Other (Severe))  Perjeta (Other (Severe))    MEDICATIONS  (STANDING):  heparin  Lock Flush 100 Units/mL Injectable 300 Unit(s) IV Push once  polyethylene glycol 3350 17 Gram(s) Oral daily  senna 2 Tablet(s) Oral at bedtime    MEDICATIONS  (PRN):  HYDROmorphone  Injectable 2 milliGRAM(s) IV Push every 2 hours PRN Severe Pain (7 - 10)  Vital Signs Last 24 Hrs  T(C): 36.8 (30 Jul 2024 07:24), Max: 37.2 (29 Jul 2024 17:35)  T(F): 98.2 (30 Jul 2024 07:24), Max: 98.9 (29 Jul 2024 17:35)  HR: 72 (30 Jul 2024 07:24) (60 - 83)  BP: 110/73 (30 Jul 2024 07:24) (85/50 - 110/73)  BP(mean): --  RR: 19 (30 Jul 2024 07:24) (14 - 19)  SpO2: 100% (30 Jul 2024 07:24) (97% - 100%)    Parameters below as of 30 Jul 2024 07:24  Patient On (Oxygen Delivery Method): room air    PHYSICAL EXAM:   Gen: NAD, pallor  Head: NC/AT  Neck: supple  Card: regular rate and rhythm. Mild ankle edema bilaterally.  Resp:  CTAB  Abd: soft, non-distended   Ext: DENNEY well  Neuro:  A&O,   Skin:  Warm and dry   Psych:  Normal affect and mood                          10.0   3.50  )-----------( 132      ( 30 Jul 2024 07:53 )             30.4   07-29    139  |  108  |  8.8  ----------------------------<  116<H>  3.8   |  26.0  |  0.36<L>    Ca    7.2<L>      29 Jul 2024 14:10    TPro  4.1<L>  /  Alb  2.4<L>  /  TBili  0.4  /  DBili  x   /  AST  58<H>  /  ALT  28  /  AlkPhos  184<H>  07-29   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 On Octerotide LD 7/18 and iv iron LD weekly LD 7/18 including prior w/u with GI (noted to have telangiectasias on EGD / GAVE) , Sent form the office for a 10 lb weight gain anf hgb of 5.7 Placed in obs Hgb 5.1 S/P 3 U PRNC hgb now 10    PAST MEDICAL & SURGICAL HISTORY:  H/O compression fracture of spine  Anxiety  Metastatic breast cancer  H/O pleural effusion  Pericardial effusion  S/P tonsillectomy  H/O chest tube placement  12/23/21  S/P pericardiocentesis  12/28/21      Allergies  pertuzumab (Other (Severe))  Perjeta (Other (Severe))    MEDICATIONS  (STANDING):  heparin  Lock Flush 100 Units/mL Injectable 300 Unit(s) IV Push once  polyethylene glycol 3350 17 Gram(s) Oral daily  senna 2 Tablet(s) Oral at bedtime    MEDICATIONS  (PRN):  HYDROmorphone  Injectable 2 milliGRAM(s) IV Push every 2 hours PRN Severe Pain (7 - 10)  Vital Signs Last 24 Hrs  T(C): 36.8 (30 Jul 2024 07:24), Max: 37.2 (29 Jul 2024 17:35)  T(F): 98.2 (30 Jul 2024 07:24), Max: 98.9 (29 Jul 2024 17:35)  HR: 72 (30 Jul 2024 07:24) (60 - 83)  BP: 110/73 (30 Jul 2024 07:24) (85/50 - 110/73)  BP(mean): --  RR: 19 (30 Jul 2024 07:24) (14 - 19)  SpO2: 100% (30 Jul 2024 07:24) (97% - 100%)    Parameters below as of 30 Jul 2024 07:24  Patient On (Oxygen Delivery Method): room air    PHYSICAL EXAM:   Gen: NAD, pallor  Head: NC/AT  Neck: supple  Card: regular rate and rhythm. Mild ankle edema bilaterally.  Resp:  CTAB  Abd: soft, non-distended   Ext: DENNEY well  Neuro:  A&O,   Skin:  Warm and dry   Psych:  Normal affect and mood       CBC                   10.0   3.50  )-----------( 132      ( 30 Jul 2024 07:53 )             30.4   CHEM  07-29    139  |  108  |  8.8  ----------------------------<  116<H>  3.8   |  26.0  |  0.36<L>    Ca    7.2<L>      29 Jul 2024 14:10    TPro  4.1<L>  /  Alb  2.4<L>  /  TBili  0.4  /  DBili  x   /  AST  58<H>  /  ALT  28  /  AlkPhos  184<H>  07-29

## 2024-07-30 NOTE — H&P ADULT - NSHPPHYSICALEXAM_GEN_ALL_CORE
Vital Signs   T(C): 37.4 (30 Jul 2024 11:45), Max: 37.4 (30 Jul 2024 11:45)  T(F): 99.4 (30 Jul 2024 11:45), Max: 99.4 (30 Jul 2024 11:45)  HR: 71 (30 Jul 2024 11:45) (60 - 83)  BP: 97/63 (30 Jul 2024 11:45) (85/50 - 110/73)  RR: 18 (30 Jul 2024 11:45) (14 - 19)  SpO2: 97% (30 Jul 2024 11:45) (97% - 100%)  Parameters below as of 30 Jul 2024 11:45  Patient On (Oxygen Delivery Method): room air  General: Young female sitting in bed comfortably. No acute distress  HEENT: EOMI. Clear conjunctivae. Moist mucus membrane. Hard of hearing.   Neck: Supple.   Chest: Good air entry. No wheezing, rales or rhonchi. Chemo port in right upper chest.   Heart: S1 & S2 with RRR.  Abdomen: Non distended. Soft. Mildly tender on left side. + BS  Ext: No pedal edema. No calf tenderness   Neuro: Awake. Moves all extremities. Speech clear.   Skin: Warm and Dry  Psychiatry: Normal mood and affect

## 2024-07-30 NOTE — ED CDU PROVIDER SUBSEQUENT DAY NOTE - ATTENDING APP SHARED VISIT CONTRIBUTION OF CARE
I, Ray Espinoza MD, have seen and examined the patient on the date of service.  I agree with the MAGO's assessment as written, with exceptions or additions as noted below or in a separate note.  Patient with a history of metastatic breast cancer presented from oncologist for anemia.  Patient reports a history of anemia.  On reassessment this morning after 3 units of packed red blood cells, she states that she is feeling better.  Not having any fatigue.  Vital signs are stable.  Patient requesting recheck of hemoglobin, will recheck and then likely discharge with outpatient follow-up.

## 2024-07-30 NOTE — ED CDU PROVIDER DISPOSITION NOTE - PATIENT PORTAL LINK FT
You can access the FollowMyHealth Patient Portal offered by [facility] by registering at the following website: [email or web address]. By joining [facility]’s FollowMyHealth portal, you will also be able to view your health information using other applications (apps) compatible with our system.

## 2024-07-30 NOTE — PATIENT PROFILE ADULT - FALL HARM RISK - HARM RISK INTERVENTIONS

## 2024-07-30 NOTE — ED ADULT NURSE REASSESSMENT NOTE - NS ED NURSE REASSESS COMMENT FT1
Patient is complaining of 10/10 pain in lower back and left sided abdominal pain. Patient is requesting PRN 2mg dilaudid IVP. JOSE Chavez approved dosage with BP 92/53. Patient states that she normally has a soft BP.
Patient resting comfortably in bed. No acute distress noted. Patient has no complaints at this time. 3rd PRBC infusion is finished. VSS.
Patient resting comfortably in bed. No acute distress noted. Patient has no complaints at this time. Patient denies CP/SOB. VSS. Patient is receiving third infusion of PRBC's.
pt stable 2 unit initiated verified by 2nd RN VSS on RA pt denies complaints at thist venita pt received from Irina FIERRO port accessed by previous RN flushes well + blood return NAD noted HR given to Shreyas FIERRO fall and safety precautions in place
Assumed care from RN CF. Patient is A&Ox3. Resting comfortably in bed. No acute distress noted. Patient has no complaints at this time. Patient denies CP/SOB. SPO2 98% on RA. Patient is receiving second unit of PRBC's. VSS. IV Access to right subclavian intact. Patient to receive 3rd unit of PRBC's after second infusion.
Assumed care of pt at 07:15 as stated in report from RN CW. Charting as noted. Patient A&O x4, complains of pain to lower back mediation given, denies CP/SOB. Updated on the plan of care. Call bell within reach, bed locked in lowest position. Port site flushed w/ NS. No redness, swelling or pain noted to site. No signs of acute distress noted, safety maintained.

## 2024-07-30 NOTE — PROGRESS NOTE ADULT - ASSESSMENT
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 On Octerotide LD 7/18 and iv iron LD weekly LD 7/18 including prior w/u with GI (noted to have telangiectasias on EGD / GAVE) , Sent form the office for a 10 lb weight gain anf hgb of 5.7 Placed in obs Hgb 5.1 S/P 3 U PRNC hgb now 10    metastatic breast cancer   - S/P Taxoetere/Herceptin   - brain mets S/P WBRT  - on Enhertu LD 6/19  - treatment on hold while hospitalized   - MRI of brain preformed 7/2/2024 2 mm enhancing lesion involving the right frontal lobe not seen on the prior study. Decrease in size  of multiple enhancing infra and supratentorial lesions. On surveillance.  - Last PET on 4/3/24 showed Asymmetric foci of hypermetabolism in the right cerebellum corresponding to lesions evident on concurrent MRI.Very mild non significant hypermetabolism associated with some of multiple mixed lytic/sclerotic lesions throughout skeleton.  No significant hypermetabolic foci to suggest metastatic activity     Will arrange follow up with Rad/Onc as an outpt after discharge     Anemia due to GAVE  - Hgb 5.1 on admission  - S/P 3 U PRBC  - Receives iv iron will be given twice outpt LD 7/18  - Aranesp increased to 500mcg from 300 mcg Q 3 weeks LD 7/11  - Octreotide was due yesterday   -follow up with Dr Chapa in the office after discharge      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 On Octerotide LD 7/18 and iv iron LD weekly LD 7/18 including prior w/u with GI (noted to have telangiectasias on EGD / GAVE) , Sent from the office for a 10 lb weight gain and hgb of 5.7 Placed in obs Hgb 5.1 S/P 3 U PRNC hgb now 10    metastatic breast cancer   - S/P Taxoetere/Herceptin   - brain mets S/P WBRT  - on Enhertu LD 6/19  - treatment on hold while hospitalized   - MRI of brain preformed 7/2/2024 2 mm enhancing lesion involving the right frontal lobe not seen on the prior study. Decrease in size  of multiple enhancing infra and supratentorial lesions. On surveillance.  - Last PET on 4/3/24 showed Asymmetric foci of hypermetabolism in the right cerebellum corresponding to lesions evident on concurrent MRI.Very mild non significant hypermetabolism associated with some of multiple mixed lytic/sclerotic lesions throughout skeleton.  No significant hypermetabolic foci to suggest metastatic activity     Will arrange follow up with Rad/Onc as an outpt after discharge     Anemia due to GAVE  - Hgb 5.1 on admission  - S/P 3 U PRBC  - Receives iv iron will be given twice outpt LD 7/18  - Aranesp increased to 500mcg from 300 mcg Q 3 weeks LD 7/11  - Octreotide was due yesterday - will check with Pharmacy    - Please obtain GI eval   - Ammonia level was elevated last admission  - please repeat    CBC is stable S/P transfusions  WBC 3.5 Hgb 10 Plt 132k     Will follow      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 On Octerotide LD 7/18 and iv iron LD weekly LD 7/18 including prior w/u with GI (noted to have telangiectasias on EGD / GAVE) , Sent from the office for a 10 lb weight gain and hgb of 5.7 Placed in obs Hgb 5.1 S/P 3 U PRNC hgb now 10    metastatic breast cancer   - S/P Taxoetere/Herceptin   - brain mets S/P WBRT  - on Enhertu LD 6/19  - treatment on hold while hospitalized   - MRI of brain preformed 7/2/2024 2 mm enhancing lesion involving the right frontal lobe not seen on the prior study. Decrease in size  of multiple enhancing infra and supratentorial lesions. On surveillance.  - Last PET on 4/3/24 showed Asymmetric foci of hypermetabolism in the right cerebellum corresponding to lesions evident on concurrent MRI.Very mild non significant hypermetabolism associated with some of multiple mixed lytic/sclerotic lesions throughout skeleton.  No significant hypermetabolic foci to suggest metastatic activity     Will arrange follow up with Rad/Onc as an outpt after discharge     Anemia due to GAVE  - Hgb 5.1 on admission  - S/P 3 U PRBC  - Receives iv iron will be given twice outpt LD 7/18  - Aranesp increased to 500mcg from 300 mcg Q 3 weeks LD 7/11  - Octreotide was due yesterday - will order Octreotide 100mcg BID   - Please obtain GI eval   -CT Abd / Pelvis is pending   - Ammonia level was elevated last admission  - please repeat    CBC is stable S/P transfusions  WBC 3.5 Hgb 10 Plt 132k     Spoke to father this AM and advise of admission   Will follow

## 2024-07-30 NOTE — ED CDU PROVIDER DISPOSITION NOTE - NSFOLLOWUPINSTRUCTIONS_ED_ALL_ED_FT
Please call and follow-up with your hematologist/oncologist within 2 or 3 days and bring the results  - Anemia    Anemia is a condition in which the concentration of red blood cells or hemoglobin in the blood is below normal. Hemoglobin is a substance in red blood cells that carries oxygen to the tissues of the body. Anemia results in not enough oxygen reaching these tissues which can cause symptoms such as weakness, dizziness/lightheadedness, shortness of breath, chest pain, paleness, or nausea. The cause of your anemia may or may not be determined immediately. If your hemoglobin was dangerously low, you may have received a blood transfusion. Usually reactions to transfusions occur immediately but monitor yourself for any fevers, rash, or shortness of breath.    SEEK IMMEDIATE MEDICAL CARE IF YOU HAVE ANY OF THE FOLLOWING SYMPTOMS: extreme weakness/chest pain/shortness of breath, black or bloody stools, vomiting blood, fainting, fever, or any signs of dehydration.      Blood Transfusion, Adult, Care After    This sheet gives you information about how to care for yourself after your procedure. Your health care provider may also give you more specific instructions. If you have problems or questions, contact your health care provider.    What can I expect after the procedure?  After the procedure, it is common to have:    Bruising and soreness where the IV was inserted.  A fever or chills on the day of the procedure. This may be your body's response to the new blood cells received.  A headache.    Follow these instructions at home:      IV insertion site care      Follow instructions from your health care provider about how to take care of your IV insertion site. Make sure you:    Wash your hands with soap and water before and after you change your bandage (dressing). If soap and water are not available, use hand .  Change your dressing as told by your health care provider.  Check your IV insertion site every day for signs of infection. Check for:    Redness, swelling, or pain.  Bleeding from the site.  Warmth.  Pus or a bad smell.        General instructions    Take over-the-counter and prescription medicines only as told by your health care provider.  Rest as told by your health care provider.  Return to your normal activities as told by your health care provider.  Keep all follow-up visits as told by your health care provider. This is important.    Contact a health care provider if:  You have itching or red, swollen areas of skin (hives).  You feel anxious.  You feel weak after doing your normal activities.  You have redness, swelling, warmth, or pain around the IV insertion site.  You have blood coming from the IV insertion site that does not stop with pressure.  You have pus or a bad smell coming from your IV insertion site.    Get help right away if:  You have symptoms of a serious allergic or immune system reaction, including:    Trouble breathing or shortness of breath.  Swelling of the face or feeling flushed.  Fever or chills.  Pain in the head, back, or chest.  Dark urine or blood in the urine.  Widespread rash.  Fast heartbeat.  Feeling dizzy or light-headed.    If you receive your blood transfusion in an outpatient setting, you will be told whom to contact to report any reactions.    These symptoms may represent a serious problem that is an emergency. Do not wait to see if the symptoms will go away. Get medical help right away. Call your local emergency services (911 in the [place] Do not drive yourself to the hospital.    Summary  Bruising and tenderness around the IV insertion site are common.  Check your IV insertion site every day for signs of infection.  Rest as told by your health care provider. Return to your normal activities as told by your health care provider.  Get help right away for symptoms of a serious allergic or immune system reaction to blood transfusion.    ADDITIONAL NOTES AND INSTRUCTIONS    Please follow up with your Primary MD in 24-48 hr.  Seek immediate medical care for any new/worsening signs or symptoms.

## 2024-07-30 NOTE — CONSULT NOTE ADULT - TIME BILLING
Reviewing her recent EGD report and lab work while supervising my rotating intern. Reviewing her recent EGD report and lab work while supervising my rotating intern. Pt. was agreeable to the above mangement plan. Reviewing her recent EGD report and lab work while supervising my rotating intern. Pt. was agreeable to the above management plan.

## 2024-07-30 NOTE — ED CDU PROVIDER DISPOSITION NOTE - CLINICAL COURSE
This is a 38 year old female with stage 4 breast cancer with metastatic disease to brain s/p immunotherapy, RT, on active chemotherapy (last dose 6/2024), chronic iron deficiency anemia due to gastric antral vascular ectasia (GAVE), recently admitted to [facility] for acute on chronic anemia s/p EGD on 7/3 without acute pathology found, presenting with low hemoglobin and sent in for transfusion.  Patient denies any reactions to past transfusions, denies any rectal bleeding.  Was told in past medications she is on may affect her blood levels. pt  is kept on observation for 3 units PRBC transfusion.  CBC repeated after transfusion at request of the patient. now hemoglobin is 10 and hematocrit 30.4. pain control patient states she has hematologist oncologist that she follow-up. ED return precaution explained to the patient include fever rash difficulty breathing.  Patient has been eating.  Not acutely distressed we will discharge the patient This is a 38 year old female with stage 4 breast cancer with metastatic disease to brain s/p immunotherapy, RT, on active chemotherapy (last dose 6/2024), chronic iron deficiency anemia due to gastric antral vascular ectasia (GAVE), recently admitted to [facility] for acute on chronic anemia s/p EGD on 7/3 without acute pathology found, presenting with low hemoglobin and sent in for transfusion.  Patient denies any reactions to past transfusions, denies any rectal bleeding.  Was told in past medications she is on may affect her blood levels. pt  is kept on observation for 3 units PRBC transfusion.  CBC repeated after transfusion at request of the patient. now hemoglobin is 10 and hematocrit 30.4. Attending spoke with the patient's heme-onc michelle  who concern for abdominal bloating. was requested repeated  ammonia levels and CT scan of the abdomen pelvis GI and heme-onc consulted again. he notifies PA and 48-hour observation for repeat the hemoglobin

## 2024-07-30 NOTE — ED CDU PROVIDER SUBSEQUENT DAY NOTE - CLINICAL SUMMARY MEDICAL DECISION MAKING FREE TEXT BOX
38 year old female with pmhx of stage 4 breast cancer with mets to the brain presents from oncologist for hemoglobin of 5.7. In ED Hgb 5.0. recent EGD done, no pathology found. Transfuse 3 units PRBC, tele monitor, likely dc in AM with continued outpatient follow up

## 2024-07-30 NOTE — CONSULT NOTE ADULT - SUBJECTIVE AND OBJECTIVE BOX
Chief Complaint:  Patient is a 38y old  Female who presents with a chief complaint of Low blood count (30 Jul 2024 12:01)      HPI:  Patient is a 38 year old woman with history of Stage 4 Breast Cancer s/p Taxotere / Herceptin with brain mets S/P WBRT now on Enhertu (last Dose ~months ago), Chronic Iron Deficiency Anemia (previously found to have GAVE), recent hospitalizations (10 days ago, discharged on 7/25/24) sent by Dr. Chapa (Hem / Onc) for anemia. Patient states that after being discharged from the hospital, patient was doing well, but started to feel bloated and had left-sided abdominal pain. Her stools are loose due to stool softeners and lately she noticed that her stool color is dark, almost black. She went for blood work and was found to have Hb of 5.7 so she was sent to [facility].  In ER, she was given 3 PRBCs. Repeat H&H 10.      PAST MEDICAL & SURGICAL HISTORY:  H/O compression fracture of spine      Anxiety      Metastatic breast cancer      H/O pleural effusion      Pericardial effusion      S/P tonsillectomy      H/O chest tube placement  12/23/21      S/P pericardiocentesis  12/28/21      REVIEW OF SYSTEMS:   General: Negative  HEENT: Negative  CV: Negative  Respiratory: Negative  GI: See HPI  : Negative  MSK: Negative  Hematologic: Negative  Skin: Negative    MEDICATIONS:   MEDICATIONS  (STANDING):  FLUoxetine 80 milliGRAM(s) Oral at bedtime  heparin  Lock Flush 100 Units/mL Injectable 300 Unit(s) IV Push once  lactulose Syrup 20 Gram(s) Oral three times a day  memantine 10 milliGRAM(s) Oral two times a day  methadone    Tablet 10 milliGRAM(s) Oral <User Schedule>  methadone    Tablet 15 milliGRAM(s) Oral at bedtime  methylphenidate 20 milliGRAM(s) Oral <User Schedule>  OLANZapine 2.5 milliGRAM(s) Oral at bedtime  pantoprazole    Tablet 40 milliGRAM(s) Oral before breakfast  pregabalin 200 milliGRAM(s) Oral three times a day    MEDICATIONS  (PRN):  acetaminophen     Tablet .. 650 milliGRAM(s) Oral every 6 hours PRN Temp greater or equal to 38C (100.4F), Mild Pain (1 - 3)  diazepam    Tablet 2 milliGRAM(s) Oral two times a day PRN for anxiety and/or muscle spasm  HYDROmorphone   Tablet 2 milliGRAM(s) Oral every 4 hours PRN Moderate Pain (4 - 6)  HYDROmorphone   Tablet 4 milliGRAM(s) Oral every 4 hours PRN Severe Pain (7 - 10)  ondansetron Injectable 4 milliGRAM(s) IV Push every 6 hours PRN Nausea and/or Vomiting      Packed Red Cells Order:  1 Unit  Indication: Hgb <7 gm/dL  Infuse Unit : 3.5 Hours (07-29-24 @ 20:24)  Packed Red Cells Order:  1 Unit  Indication: Hgb <7 gm/dL  Infuse Unit : 3 Hours (07-29-24 @ 15:11)  Packed Red Cells Order:  1 Unit  Indication: Hgb <7 gm/dL  Infuse Unit : 3 Hours (07-29-24 @ 15:11)  Packed Red Cells Order:  1 Unit  Indication: Hgb <7 gm/dL  Infuse Unit : 3.5 Hours (07-29-24 @ 15:05)  Packed Red Cells Order:  1 Unit  Indication: Hgb <7 gm/dL  Infuse Unit : 3.5 Hours (07-29-24 @ 15:05)      DIET:  Diet, Regular:   Supplement Feeding Modality:  Oral  Ensure Enlive Cans or Servings Per Day:  1       Frequency:  Two Times a day (07-30-24 @ 11:47) [Active]      ALLERGIES:   Allergies    pertuzumab (Other (Severe))  Perjeta (Other (Severe))    Intolerances        Substance Use:   (  ) never used  (  ) other:  Tobacco Usage:  (   ) never smoked   (   ) former smoker   (   ) current smoker  (     ) pack year  (        ) last cigarette date  Alcohol Usage:    Family History   IBD (  ) Yes   (  ) No  GI Malignancy (  )  Yes    (  ) No    Health Management  Last Colonoscopy: 1/2024  Last Endoscopy:  7/2024    VITAL SIGNS:   Vital Signs Last 24 Hrs  T(C): 37.1 (30 Jul 2024 12:31), Max: 37.4 (30 Jul 2024 11:45)  T(F): 98.8 (30 Jul 2024 12:31), Max: 99.4 (30 Jul 2024 11:45)  HR: 75 (30 Jul 2024 12:31) (60 - 77)  BP: 98/66 (30 Jul 2024 12:31) (85/50 - 110/73)  BP(mean): --  RR: 18 (30 Jul 2024 12:31) (16 - 19)  SpO2: 98% (30 Jul 2024 12:31) (97% - 100%)    Parameters below as of 30 Jul 2024 12:31  Patient On (Oxygen Delivery Method): room air      I&O's Summary      PHYSICAL EXAM:   GENERAL:  No acute distress  HEENT:  NC/AT, conjunctiva clear, sclera anicteric  CHEST:  No increased effort  HEART:  Regular rate  ABDOMEN:  Soft, non-tender, non-distended, normoactive bowel sounds, no rebound or guarding  EXTREMITIES: No edema  SKIN:  Warm, dry  NEURO:  Calm, cooperative    LABS:                        10.0   3.50  )-----------( 132      ( 30 Jul 2024 07:53 )             30.4     Hemoglobin: 10.0 g/dL (07-30-24 @ 07:53)  Hemoglobin: 5.1 g/dL (07-29-24 @ 14:10)    07-30    139  |  105  |  7.2<L>  ----------------------------<  125<H>  5.0   |  25.0  |  0.34<L>    Ca    7.6<L>      30 Jul 2024 10:50    TPro  4.7<L>  /  Alb  2.5<L>  /  TBili  1.5  /  DBili  x   /  AST  79<H>  /  ALT  33<H>  /  AlkPhos  216<H>  07-30    LIVER FUNCTIONS - ( 30 Jul 2024 10:50 )  Alb: 2.5 g/dL / Pro: 4.7 g/dL / ALK PHOS: 216 U/L / ALT: 33 U/L / AST: 79 U/L / GGT: x             PT/INR - ( 29 Jul 2024 14:10 )   PT: 12.9 sec;   INR: 1.17 ratio        Ammonia, Serum: 81 umol/L (07-30-24 @ 10:50)    RADIOLOGY & ADDITIONAL STUDIES: Reviewed. Pending CT abdomen         Chief Complaint:  Patient is a 38y old  Female who presents with a chief complaint of Low blood count (30 Jul 2024 12:01)      HPI:  Patient is a 38 year old woman with history of Stage 4 Breast Cancer s/p Taxotere / Herceptin with brain mets S/P WBRT now on Enhertu (last Dose ~months ago), Chronic Iron Deficiency Anemia (previously found to have GAVE), recent hospitalizations (10 days ago, discharged on 7/25/24) sent by Dr. Chapa (Hem / Onc) for anemia. Patient states that after being discharged from the hospital, patient was doing well, but started to feel bloated and had left-sided abdominal pain. Her stools are loose due to stool softeners and lately she noticed that her stool color is dark, almost black. She went for blood work and was found to have Hb of 5.7 so she was sent to [facility].  In ER, she was given 3 PRBCs. Repeat H&H 10.      PAST MEDICAL & SURGICAL HISTORY:  H/O compression fracture of spine      Anxiety      Metastatic breast cancer      H/O pleural effusion      Pericardial effusion      S/P tonsillectomy      H/O chest tube placement  12/23/21      S/P pericardiocentesis  12/28/21      REVIEW OF SYSTEMS:   General: Negative  HEENT: Negative  CV: Negative  Respiratory: Negative  GI: See HPI  : Negative  MSK: Negative  Hematologic: Negative  Skin: Negative    MEDICATIONS:   MEDICATIONS  (STANDING):  FLUoxetine 80 milliGRAM(s) Oral at bedtime  heparin  Lock Flush 100 Units/mL Injectable 300 Unit(s) IV Push once  lactulose Syrup 20 Gram(s) Oral three times a day  memantine 10 milliGRAM(s) Oral two times a day  methadone    Tablet 10 milliGRAM(s) Oral <User Schedule>  methadone    Tablet 15 milliGRAM(s) Oral at bedtime  methylphenidate 20 milliGRAM(s) Oral <User Schedule>  OLANZapine 2.5 milliGRAM(s) Oral at bedtime  pantoprazole    Tablet 40 milliGRAM(s) Oral before breakfast  pregabalin 200 milliGRAM(s) Oral three times a day    MEDICATIONS  (PRN):  acetaminophen     Tablet .. 650 milliGRAM(s) Oral every 6 hours PRN Temp greater or equal to 38C (100.4F), Mild Pain (1 - 3)  diazepam    Tablet 2 milliGRAM(s) Oral two times a day PRN for anxiety and/or muscle spasm  HYDROmorphone   Tablet 2 milliGRAM(s) Oral every 4 hours PRN Moderate Pain (4 - 6)  HYDROmorphone   Tablet 4 milliGRAM(s) Oral every 4 hours PRN Severe Pain (7 - 10)  ondansetron Injectable 4 milliGRAM(s) IV Push every 6 hours PRN Nausea and/or Vomiting      Packed Red Cells Order:  1 Unit  Indication: Hgb <7 gm/dL  Infuse Unit : 3.5 Hours (07-29-24 @ 20:24)  Packed Red Cells Order:  1 Unit  Indication: Hgb <7 gm/dL  Infuse Unit : 3 Hours (07-29-24 @ 15:11)  Packed Red Cells Order:  1 Unit  Indication: Hgb <7 gm/dL  Infuse Unit : 3 Hours (07-29-24 @ 15:11)  Packed Red Cells Order:  1 Unit  Indication: Hgb <7 gm/dL  Infuse Unit : 3.5 Hours (07-29-24 @ 15:05)  Packed Red Cells Order:  1 Unit  Indication: Hgb <7 gm/dL  Infuse Unit : 3.5 Hours (07-29-24 @ 15:05)      DIET:  Diet, Regular:   Supplement Feeding Modality:  Oral  Ensure Enlive Cans or Servings Per Day:  1       Frequency:  Two Times a day (07-30-24 @ 11:47) [Active]      ALLERGIES:   Allergies    pertuzumab (Other (Severe))  Perjeta (Other (Severe))    Intolerances        Substance Use:   (  ) never used  (  ) other:  Tobacco Usage:  (   ) never smoked   (   ) former smoker   (   ) current smoker  (     ) pack year  (        ) last cigarette date  Alcohol Usage:    Family History   IBD (  ) Yes   (  ) No  GI Malignancy (  )  Yes    (  ) No    Health Management  Last Colonoscopy: 1/2024  Last Endoscopy:  7/2024    VITAL SIGNS:   Vital Signs Last 24 Hrs  T(C): 37.1 (30 Jul 2024 12:31), Max: 37.4 (30 Jul 2024 11:45)  T(F): 98.8 (30 Jul 2024 12:31), Max: 99.4 (30 Jul 2024 11:45)  HR: 75 (30 Jul 2024 12:31) (60 - 77)  BP: 98/66 (30 Jul 2024 12:31) (85/50 - 110/73)  BP(mean): --  RR: 18 (30 Jul 2024 12:31) (16 - 19)  SpO2: 98% (30 Jul 2024 12:31) (97% - 100%)    Parameters below as of 30 Jul 2024 12:31  Patient On (Oxygen Delivery Method): room air      I&O's Summary      PHYSICAL EXAM:   GENERAL:  No acute distress  HEENT:  NC/AT, conjunctiva clear, sclera anicteric  CHEST:  No increased effort  HEART:  Regular rate  ABDOMEN:  Soft, tender to palpation on LLQ, non-distended, normoactive bowel sounds,  EXTREMITIES: No edema  SKIN:  Warm, dry  NEURO:  Calm, cooperative    LABS:                        10.0   3.50  )-----------( 132      ( 30 Jul 2024 07:53 )             30.4     Hemoglobin: 10.0 g/dL (07-30-24 @ 07:53)  Hemoglobin: 5.1 g/dL (07-29-24 @ 14:10)    07-30    139  |  105  |  7.2<L>  ----------------------------<  125<H>  5.0   |  25.0  |  0.34<L>    Ca    7.6<L>      30 Jul 2024 10:50    TPro  4.7<L>  /  Alb  2.5<L>  /  TBili  1.5  /  DBili  x   /  AST  79<H>  /  ALT  33<H>  /  AlkPhos  216<H>  07-30    LIVER FUNCTIONS - ( 30 Jul 2024 10:50 )  Alb: 2.5 g/dL / Pro: 4.7 g/dL / ALK PHOS: 216 U/L / ALT: 33 U/L / AST: 79 U/L / GGT: x             PT/INR - ( 29 Jul 2024 14:10 )   PT: 12.9 sec;   INR: 1.17 ratio        Ammonia, Serum: 81 umol/L (07-30-24 @ 10:50)    RADIOLOGY & ADDITIONAL STUDIES: Reviewed. Pending CT abdomen

## 2024-07-31 ENCOUNTER — TRANSCRIPTION ENCOUNTER (OUTPATIENT)
Age: 39
End: 2024-07-31

## 2024-07-31 LAB
ALBUMIN SERPL ELPH-MCNC: 2.7 G/DL — LOW (ref 3.3–5.2)
ALP SERPL-CCNC: 251 U/L — HIGH (ref 40–120)
ALT FLD-CCNC: 38 U/L — HIGH
ANION GAP SERPL CALC-SCNC: 7 MMOL/L — SIGNIFICANT CHANGE UP (ref 5–17)
AST SERPL-CCNC: 81 U/L — HIGH
BILIRUB SERPL-MCNC: 1 MG/DL — SIGNIFICANT CHANGE UP (ref 0.4–2)
BUN SERPL-MCNC: 6.6 MG/DL — LOW (ref 8–20)
CALCIUM SERPL-MCNC: 8.2 MG/DL — LOW (ref 8.4–10.5)
CHLORIDE SERPL-SCNC: 106 MMOL/L — SIGNIFICANT CHANGE UP (ref 96–108)
CO2 SERPL-SCNC: 27 MMOL/L — SIGNIFICANT CHANGE UP (ref 22–29)
CREAT SERPL-MCNC: 0.48 MG/DL — LOW (ref 0.5–1.3)
EGFR: 125 ML/MIN/1.73M2 — SIGNIFICANT CHANGE UP
GLUCOSE SERPL-MCNC: 86 MG/DL — SIGNIFICANT CHANGE UP (ref 70–99)
HCT VFR BLD CALC: 29.2 % — LOW (ref 34.5–45)
HGB BLD-MCNC: 9.4 G/DL — LOW (ref 11.5–15.5)
INR BLD: 1.11 RATIO — SIGNIFICANT CHANGE UP (ref 0.85–1.18)
MAGNESIUM SERPL-MCNC: 1.9 MG/DL — SIGNIFICANT CHANGE UP (ref 1.6–2.6)
MCHC RBC-ENTMCNC: 30 PG — SIGNIFICANT CHANGE UP (ref 27–34)
MCHC RBC-ENTMCNC: 32.2 GM/DL — SIGNIFICANT CHANGE UP (ref 32–36)
MCV RBC AUTO: 93.3 FL — SIGNIFICANT CHANGE UP (ref 80–100)
PLATELET # BLD AUTO: 144 K/UL — LOW (ref 150–400)
POTASSIUM SERPL-MCNC: 4 MMOL/L — SIGNIFICANT CHANGE UP (ref 3.5–5.3)
POTASSIUM SERPL-SCNC: 4 MMOL/L — SIGNIFICANT CHANGE UP (ref 3.5–5.3)
PROT SERPL-MCNC: 4.7 G/DL — LOW (ref 6.6–8.7)
PROTHROM AB SERPL-ACNC: 12.3 SEC — SIGNIFICANT CHANGE UP (ref 9.5–13)
RBC # BLD: 3.13 M/UL — LOW (ref 3.8–5.2)
RBC # FLD: 19.9 % — HIGH (ref 10.3–14.5)
SODIUM SERPL-SCNC: 140 MMOL/L — SIGNIFICANT CHANGE UP (ref 135–145)
WBC # BLD: 3.29 K/UL — LOW (ref 3.8–10.5)
WBC # FLD AUTO: 3.29 K/UL — LOW (ref 3.8–10.5)

## 2024-07-31 PROCEDURE — 99232 SBSQ HOSP IP/OBS MODERATE 35: CPT

## 2024-07-31 PROCEDURE — 43255 EGD CONTROL BLEEDING ANY: CPT

## 2024-07-31 RX ORDER — PANTOPRAZOLE SODIUM 20 MG/1
40 TABLET, DELAYED RELEASE ORAL
Refills: 0 | Status: DISCONTINUED | OUTPATIENT
Start: 2024-07-31 | End: 2024-08-07

## 2024-07-31 RX ORDER — CHLORHEXIDINE GLUCONATE 500 MG/1
1 CLOTH TOPICAL
Refills: 0 | Status: DISCONTINUED | OUTPATIENT
Start: 2024-07-31 | End: 2024-08-07

## 2024-07-31 RX ADMIN — Medication 2 MILLIGRAM(S): at 11:04

## 2024-07-31 RX ADMIN — OCTREOTIDE ACETATE 100 MICROGRAM(S): 200 INJECTION INTRAVENOUS at 06:21

## 2024-07-31 RX ADMIN — Medication 200 MILLIGRAM(S): at 21:47

## 2024-07-31 RX ADMIN — Medication 20 GRAM(S): at 06:20

## 2024-07-31 RX ADMIN — Medication 2 MILLIGRAM(S): at 01:19

## 2024-07-31 RX ADMIN — Medication 15 MILLIGRAM(S): at 21:43

## 2024-07-31 RX ADMIN — Medication 2.5 MILLIGRAM(S): at 21:48

## 2024-07-31 RX ADMIN — MEMANTINE HYDROCHLORIDE 10 MILLIGRAM(S): 14 CAPSULE, EXTENDED RELEASE ORAL at 17:48

## 2024-07-31 RX ADMIN — Medication 2 MILLIGRAM(S): at 14:25

## 2024-07-31 RX ADMIN — Medication 2 MILLIGRAM(S): at 12:04

## 2024-07-31 RX ADMIN — Medication 200 MILLIGRAM(S): at 13:25

## 2024-07-31 RX ADMIN — Medication 2 MILLIGRAM(S): at 21:47

## 2024-07-31 RX ADMIN — Medication 2 MILLIGRAM(S): at 18:00

## 2024-07-31 RX ADMIN — Medication 2 MILLIGRAM(S): at 17:48

## 2024-07-31 RX ADMIN — CHLORHEXIDINE GLUCONATE 1 APPLICATION(S): 500 CLOTH TOPICAL at 06:22

## 2024-07-31 RX ADMIN — Medication 10 MILLIGRAM(S): at 13:25

## 2024-07-31 RX ADMIN — Medication 2 MILLIGRAM(S): at 13:25

## 2024-07-31 RX ADMIN — OCTREOTIDE ACETATE 100 MICROGRAM(S): 200 INJECTION INTRAVENOUS at 17:48

## 2024-07-31 RX ADMIN — Medication 2 MILLIGRAM(S): at 04:51

## 2024-07-31 RX ADMIN — Medication 2 MILLIGRAM(S): at 08:31

## 2024-07-31 RX ADMIN — Medication 200 MILLIGRAM(S): at 06:21

## 2024-07-31 RX ADMIN — Medication 2 MILLIGRAM(S): at 22:45

## 2024-07-31 RX ADMIN — Medication 20 GRAM(S): at 13:25

## 2024-07-31 RX ADMIN — Medication 80 MILLIGRAM(S): at 21:49

## 2024-07-31 RX ADMIN — Medication 10 MILLIGRAM(S): at 08:31

## 2024-07-31 RX ADMIN — METHYLPHENIDATE HYDROCHLORIDE 20 MILLIGRAM(S): 18 TABLET, EXTENDED RELEASE ORAL at 11:04

## 2024-07-31 RX ADMIN — Medication 2 MILLIGRAM(S): at 00:19

## 2024-07-31 RX ADMIN — METHYLPHENIDATE HYDROCHLORIDE 20 MILLIGRAM(S): 18 TABLET, EXTENDED RELEASE ORAL at 06:34

## 2024-07-31 RX ADMIN — PANTOPRAZOLE SODIUM 40 MILLIGRAM(S): 20 TABLET, DELAYED RELEASE ORAL at 06:21

## 2024-07-31 RX ADMIN — PANTOPRAZOLE SODIUM 40 MILLIGRAM(S): 20 TABLET, DELAYED RELEASE ORAL at 17:48

## 2024-07-31 RX ADMIN — Medication 2 MILLIGRAM(S): at 09:31

## 2024-07-31 RX ADMIN — MEMANTINE HYDROCHLORIDE 10 MILLIGRAM(S): 14 CAPSULE, EXTENDED RELEASE ORAL at 06:22

## 2024-07-31 NOTE — PROGRESS NOTE ADULT - SUBJECTIVE AND OBJECTIVE BOX
Patient is a 38y old  Female who presents with a chief complaint of Low blood count (31 Jul 2024 11:28)    INTERVAL HPI/OVERNIGHT EVENTS: No acute events overnight. HD stable.     MEDICATIONS  (STANDING):  chlorhexidine 2% Cloths 1 Application(s) Topical <User Schedule>  FLUoxetine 80 milliGRAM(s) Oral at bedtime  heparin  Lock Flush 100 Units/mL Injectable 300 Unit(s) IV Push once  lactulose Syrup 20 Gram(s) Oral three times a day  memantine 10 milliGRAM(s) Oral two times a day  methadone    Tablet 10 milliGRAM(s) Oral <User Schedule>  methadone    Tablet 15 milliGRAM(s) Oral at bedtime  methylphenidate 20 milliGRAM(s) Oral <User Schedule>  octreotide  Injectable 100 MICROGram(s) SubCutaneous two times a day  OLANZapine 2.5 milliGRAM(s) Oral at bedtime  pantoprazole    Tablet 40 milliGRAM(s) Oral before breakfast  pregabalin 200 milliGRAM(s) Oral three times a day    MEDICATIONS  (PRN):  acetaminophen     Tablet .. 650 milliGRAM(s) Oral every 6 hours PRN Temp greater or equal to 38C (100.4F), Mild Pain (1 - 3)  diazepam    Tablet 2 milliGRAM(s) Oral two times a day PRN for anxiety and/or muscle spasm  HYDROmorphone   Tablet 2 milliGRAM(s) Oral every 4 hours PRN Moderate Pain (4 - 6)  HYDROmorphone  Injectable 2 milliGRAM(s) IV Push every 2 hours PRN Severe Pain (7 - 10)  ondansetron Injectable 4 milliGRAM(s) IV Push every 6 hours PRN Nausea and/or Vomiting      Allergies    pertuzumab (Other (Severe))  Perjeta (Other (Severe))    Intolerances        REVIEW OF SYSTEMS: all negative with exception of above    Vital Signs Last 24 Hrs  T(C): 36.9 (31 Jul 2024 08:29), Max: 37.1 (30 Jul 2024 22:09)  T(F): 98.5 (31 Jul 2024 08:29), Max: 98.8 (30 Jul 2024 22:09)  HR: 84 (31 Jul 2024 08:29) (68 - 88)  BP: 109/71 (31 Jul 2024 08:29) (98/73 - 117/79)  BP(mean): --  RR: 18 (31 Jul 2024 08:29) (18 - 18)  SpO2: 95% (31 Jul 2024 08:29) (91% - 100%)    Parameters below as of 31 Jul 2024 08:29  Patient On (Oxygen Delivery Method): room air        PHYSICAL EXAM:  General: Young female sitting in bed comfortably. No acute distress  HEENT: EOMI. Clear conjunctivae. Moist mucus membrane. Hard of hearing.   Neck: Supple.   Chest: Good air entry. No wheezing, rales or rhonchi. Chemo port in right upper chest.   Heart: S1 & S2 with RRR.  Abdomen: Non distended. Soft. Mildly tender on left side. + BS  Ext: No pedal edema. No calf tenderness   Neuro: Awake. Moves all extremities. Speech clear.   Skin: Warm and Dry  Psychiatry: Normal mood and affect    LABS:                        9.4    3.29  )-----------( 144      ( 31 Jul 2024 05:05 )             29.2     07-31    140  |  106  |  6.6<L>  ----------------------------<  86  4.0   |  27.0  |  0.48<L>    Ca    8.2<L>      31 Jul 2024 05:05  Mg     1.9     07-31    TPro  4.7<L>  /  Alb  2.7<L>  /  TBili  1.0  /  DBili  x   /  AST  81<H>  /  ALT  38<H>  /  AlkPhos  251<H>  07-31    PT/INR - ( 31 Jul 2024 05:05 )   PT: 12.3 sec;   INR: 1.11 ratio           Urinalysis Basic - ( 31 Jul 2024 05:05 )    Color: x / Appearance: x / SG: x / pH: x  Gluc: 86 mg/dL / Ketone: x  / Bili: x / Urobili: x   Blood: x / Protein: x / Nitrite: x   Leuk Esterase: x / RBC: x / WBC x   Sq Epi: x / Non Sq Epi: x / Bacteria: x      CAPILLARY BLOOD GLUCOSE          RADIOLOGY & ADDITIONAL TESTS:    Imaging Personally Reviewed:  [ ] YES  [ ] NO  < from: CT Abdomen and Pelvis w/ IV Cont (07.30.24 @ 14:05) >    IMPRESSION: Mild ascites. Splenomegaly.    --- End of Report ---            JASPAL TRACY MD; Attending Radiologist  This document has been electronically signed. Jul 30 2024  2:33PM    < end of copied text >        Consultant(s) Notes Reviewed:  [ ] YES  [ ] NO    Care Discussed with Consultants/Other Providers [ ] YES  [ ] NO

## 2024-07-31 NOTE — PROGRESS NOTE ADULT - SUBJECTIVE AND OBJECTIVE BOX
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 On Octerotide LD 7/18 and iv iron LD weekly LD 7/18 including prior w/u with GI (noted to have telangiectasias on EGD / GAVE) , Sent form the office for a 10 lb weight gain anf hgb of 5.7 Placed in obs Hgb 5.1 S/P 3 U PRNC hgb now 10    PAST MEDICAL & SURGICAL HISTORY:  H/O compression fracture of spine  Anxiety  Metastatic breast cancer  H/O pleural effusion  Pericardial effusion  S/P tonsillectomy  H/O chest tube placement  12/23/21  S/P pericardiocentesis  12/28/21      Allergies  pertuzumab (Other (Severe))  Perjeta (Other (Severe))    MEDICATIONS  (STANDING):  heparin  Lock Flush 100 Units/mL Injectable 300 Unit(s) IV Push once  polyethylene glycol 3350 17 Gram(s) Oral daily  senna 2 Tablet(s) Oral at bedtime    MEDICATIONS  (PRN):  HYDROmorphone  Injectable 2 milliGRAM(s) IV Push every 2 hours PRN Severe Pain (7 - 10)    Vital Signs Last 24 Hrs  T(C): 36.9 (31 Jul 2024 08:29), Max: 37.4 (30 Jul 2024 11:45)  T(F): 98.5 (31 Jul 2024 08:29), Max: 99.4 (30 Jul 2024 11:45)  HR: 84 (31 Jul 2024 08:29) (68 - 88)  BP: 109/71 (31 Jul 2024 08:29) (97/63 - 117/79)  BP(mean): --  RR: 18 (31 Jul 2024 08:29) (18 - 18)  SpO2: 95% (31 Jul 2024 08:29) (91% - 100%)    Parameters below as of 31 Jul 2024 08:29  Patient On (Oxygen Delivery Method): room air    PHYSICAL EXAM:   Gen: NAD, pallor  Head: NC/AT  Neck: supple  Card: regular rate and rhythm. Mild ankle edema bilaterally.  Resp:  CTAB  Abd: soft, non-distended   Ext: DENNEY well  Neuro:  A&O,   Skin:  Warm and dry   Psych:  Normal affect and mood       CBC                           9.4    3.29  )-----------( 144      ( 31 Jul 2024 05:05 )             29.2                10.0   3.50  )-----------( 132      ( 30 Jul 2024 07:53 )             30.4   CHEM    07-31    140  |  106  |  6.6<L>  ----------------------------<  86  4.0   |  27.0  |  0.48<L>    Ca    8.2<L>      31 Jul 2024 05:05  Mg     1.9     07-31    TPro  4.7<L>  /  Alb  2.7<L>  /  TBili  1.0  /  DBili  x   /  AST  81<H>  /  ALT  38<H>  /  AlkPhos  251<H>  07-31 07-29    139  |  108  |  8.8  ----------------------------<  116<H>  3.8   |  26.0  |  0.36<L>    Ca    7.2<L>      29 Jul 2024 14:10    TPro  4.1<L>  /  Alb  2.4<L>  /  TBili  0.4  /  DBili  x   /  AST  58<H>  /  ALT  28  /  AlkPhos  184<H>  07-29

## 2024-07-31 NOTE — PROGRESS NOTE ADULT - ASSESSMENT
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 Chapa (Hem / Onc) for anemia. As per patient, after being discharged from the hospital -- she was doing OK however she was feeling bloated and very winded. Her stools are loose due to stool softeners and lately she noticed stool color is dark. She went for blood work and was found to have Hb of 5.7 so she was sent to [facility].  In ER, H&H 5.1/16.9, she was given 3 PRBCs. Repeat H&H 10/30.4. Dr. Chapa recommended CT Abd / Pelvis and GI Evaluation and admission for close monitoring for 48 hours.     1) Acute on Chronic Anemia due to GAVE  - History of GAVE  - EGD on 7/3: There were many, minute, diffusely scattered red spots in the antrum. Previously seen GAVE improved/resolved. Scattered stellate scars seen consistent with healing. Moderately erythematous mucosa consistent with gastritis. There were many, minute, diffusely scattered red spots throughout the first and second portions of the duodenum. No source of anemia or possible dark stools seen on upper endoscopy. Source of scant red blood seen is likely from known hemorrhoids. Profound anemia is unlikely to be attributable to GI losses.   - She is also on Enhertu, which can cause anemia  - CT Abd / Pelvis is pending   - GI was consulted by ED  - s/p 3 PRBCs  - Gets Venofer and Aranesp as an outpatient  - Monitor H&H  - Hem/Onc following   - Appreciate GI recs- Plan for EGD today  - CT A/P reviewed    2) Metastatic Breast Cancer   - On Enhertu (last dose 6/19)  - Hem / Onc following  - Outpatient follow up with Rad / Onc  - Pain control     3) Thrombocytopenia  - Chronic and intermittent   - Likely due to Enhertu  - Monitor     4) Anxiety / Mood Disorder  - Continue Fluoxetine, Olanzapine and Diazepam (PRN)    DVT Prophylaxis -- Venodyne     Dispo: Home once stable.

## 2024-07-31 NOTE — PROGRESS NOTE ADULT - ASSESSMENT
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 Decrease in size  of multiple enhancing infra and supratentorial lesions. On surveillance.  - Last PET on 4/3/24 showed Asymmetric foci of hypermetabolism in the right cerebellum corresponding to lesions evident on concurrent MRI.Very mild non significant hypermetabolism associated with some of multiple mixed lytic/sclerotic lesions throughout skeleton.  No significant hypermetabolic foci to suggest metastatic activity     Will arrange follow up with Rad/Onc as an outpt after discharge     Anemia due to GAVE  - Hgb 5.1 on admission  - S/P 3 U PRBC  - Receives iv iron will be given twice outpt LD 7/18  - Aranesp increased to 500mcg from 300 mcg Q 3 weeks LD 7/11  - Octreotide was due yesterday - will order Octreotide 100mcg BID   - GI following -Plan for endoscopy  -CT Abd / Pelvis  Mild ascites. Splenomegaly. LIVER: Within normal limits. Hepatic veins and portal vein are patent.  - Ammonia level 81- await GI recommendations     CBC is stable S/P transfusions  WBC 3.29 Hgb 9.4 Plt 144k     Will follow      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 On Octerotide LD 7/18 and iv iron LD weekly LD 7/18 including prior w/u with GI (noted to have telangiectasias on EGD / GAVE) , Sent from the office for a 10 lb weight gain and hgb of 5.7 Placed in obs Hgb 5.1 S/P 3 U PRNC hgb now 10    metastatic breast cancer   - S/P Taxoetere/Herceptin   - brain mets S/P WBRT  - on Enhertu LD 6/19  - treatment on hold while hospitalized   - MRI of brain preformed 7/2/2024 2 mm enhancing lesion involving the right frontal lobe not seen on the prior study. Decrease in size  of multiple enhancing infra and supratentorial lesions. On surveillance.  - Last PET on 4/3/24 showed Asymmetric foci of hypermetabolism in the right cerebellum corresponding to lesions evident on concurrent MRI.Very mild non significant hypermetabolism associated with some of multiple mixed lytic/sclerotic lesions throughout skeleton.  No significant hypermetabolic foci to suggest metastatic activity     Will arrange follow up with Rad/Onc as an outpt after discharge     Anemia due to GAVE  - Hgb 5.1 on admission  - S/P 3 U PRBC  - Receives iv iron will be given twice outpt LD 7/18  - Aranesp increased to 500mcg from 300 mcg Q 3 weeks LD 7/11  - Octreotide was due yesterday - will order Octreotide 100mcg BID   - GI following -Plan for endoscopy  -CT Abd / Pelvis  Mild ascites. Splenomegaly. LIVER: Within normal limits. Hepatic veins and portal vein are patent.    - Ammonia level 81- await GI recommendations    - Ammonia concentration increases in red cell units (RBCs) during storage and pt has been getting frequent transfusions?     CBC is stable S/P transfusions  WBC 3.29 Hgb 9.4 Plt 144k     Will follow      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 On Octerotide LD 7/18 and iv iron LD weekly LD 7/18 including prior w/u with GI (noted to have telangiectasias on EGD / GAVE) , Sent from the office for a 10 lb weight gain and hgb of 5.7 Placed in obs Hgb 5.1 S/P 3 U PRNC hgb now 10    metastatic breast cancer   - S/P Taxoetere/Herceptin   - brain mets S/P WBRT  - on Enhertu LD 6/19  - treatment on hold while hospitalized   - MRI of brain preformed 7/2/2024 2 mm enhancing lesion involving the right frontal lobe not seen on the prior study. Decrease in size  of multiple enhancing infra and supratentorial lesions. On surveillance.  - Last PET on 4/3/24 showed Asymmetric foci of hypermetabolism in the right cerebellum corresponding to lesions evident on concurrent MRI.Very mild non significant hypermetabolism associated with some of multiple mixed lytic/sclerotic lesions throughout skeleton.  No significant hypermetabolic foci to suggest metastatic activity     Will arrange follow up with Rad/Onc as an outpt after discharge     Anemia due to GAVE  - Hgb 5.1 on admission  - S/P 3 U PRBC  - Receives iv iron will be given twice outpt LD 7/18  - Aranesp increased to 500mcg from 300 mcg Q 3 weeks LD 7/11  - Octreotide was due yesterday - will order Octreotide 100mcg BID   - GI following -Plan for endoscopy  -CT Abd / Pelvis  Mild ascites. Splenomegaly. LIVER: Within normal limits. Hepatic veins and portal vein are patent.    - Ammonia level 81   - Ammonia concentration increases in red cell units (RBCs) during storage and pt has been getting frequent transfusions?   - ferritin 349 ALK Phos  213 ( known bone mets ) AST 77 ALT 37 Drawn in the office on 7/29   - Recommend MRI ABD w and W/O contrast to evaluate liver     CBC is stable S/P transfusions  WBC 3.29 Hgb 9.4 Plt 144k     Will follow

## 2024-08-01 DIAGNOSIS — Z71.89 OTHER SPECIFIED COUNSELING: ICD-10-CM

## 2024-08-01 DIAGNOSIS — Z51.5 ENCOUNTER FOR PALLIATIVE CARE: ICD-10-CM

## 2024-08-01 DIAGNOSIS — C50.919 MALIGNANT NEOPLASM OF UNSPECIFIED SITE OF UNSPECIFIED FEMALE BREAST: ICD-10-CM

## 2024-08-01 DIAGNOSIS — D62 ACUTE POSTHEMORRHAGIC ANEMIA: ICD-10-CM

## 2024-08-01 DIAGNOSIS — G89.3 NEOPLASM RELATED PAIN (ACUTE) (CHRONIC): ICD-10-CM

## 2024-08-01 DIAGNOSIS — K92.2 GASTROINTESTINAL HEMORRHAGE, UNSPECIFIED: ICD-10-CM

## 2024-08-01 LAB
ALBUMIN SERPL ELPH-MCNC: 2.8 G/DL — LOW (ref 3.3–5.2)
ALP SERPL-CCNC: 226 U/L — HIGH (ref 40–120)
ALT FLD-CCNC: 31 U/L — SIGNIFICANT CHANGE UP
ANION GAP SERPL CALC-SCNC: 9 MMOL/L — SIGNIFICANT CHANGE UP (ref 5–17)
AST SERPL-CCNC: 62 U/L — HIGH
BILIRUB SERPL-MCNC: 1.1 MG/DL — SIGNIFICANT CHANGE UP (ref 0.4–2)
BUN SERPL-MCNC: 6.2 MG/DL — LOW (ref 8–20)
CALCIUM SERPL-MCNC: 8.2 MG/DL — LOW (ref 8.4–10.5)
CHLORIDE SERPL-SCNC: 108 MMOL/L — SIGNIFICANT CHANGE UP (ref 96–108)
CO2 SERPL-SCNC: 22 MMOL/L — SIGNIFICANT CHANGE UP (ref 22–29)
CREAT SERPL-MCNC: 0.3 MG/DL — LOW (ref 0.5–1.3)
EGFR: 140 ML/MIN/1.73M2 — SIGNIFICANT CHANGE UP
GLUCOSE SERPL-MCNC: 70 MG/DL — SIGNIFICANT CHANGE UP (ref 70–99)
HCT VFR BLD CALC: 29.7 % — LOW (ref 34.5–45)
HCT VFR BLD CALC: 31.7 % — LOW (ref 34.5–45)
HGB BLD-MCNC: 9.2 G/DL — LOW (ref 11.5–15.5)
HGB BLD-MCNC: 9.7 G/DL — LOW (ref 11.5–15.5)
MCHC RBC-ENTMCNC: 29.7 PG — SIGNIFICANT CHANGE UP (ref 27–34)
MCHC RBC-ENTMCNC: 29.8 PG — SIGNIFICANT CHANGE UP (ref 27–34)
MCHC RBC-ENTMCNC: 30.6 GM/DL — LOW (ref 32–36)
MCHC RBC-ENTMCNC: 31 GM/DL — LOW (ref 32–36)
MCV RBC AUTO: 96.1 FL — SIGNIFICANT CHANGE UP (ref 80–100)
MCV RBC AUTO: 96.9 FL — SIGNIFICANT CHANGE UP (ref 80–100)
PLATELET # BLD AUTO: 133 K/UL — LOW (ref 150–400)
PLATELET # BLD AUTO: 205 K/UL — SIGNIFICANT CHANGE UP (ref 150–400)
POTASSIUM SERPL-MCNC: 4.8 MMOL/L — SIGNIFICANT CHANGE UP (ref 3.5–5.3)
POTASSIUM SERPL-SCNC: 4.8 MMOL/L — SIGNIFICANT CHANGE UP (ref 3.5–5.3)
PROT SERPL-MCNC: 5.2 G/DL — LOW (ref 6.6–8.7)
RBC # BLD: 3.09 M/UL — LOW (ref 3.8–5.2)
RBC # BLD: 3.27 M/UL — LOW (ref 3.8–5.2)
RBC # FLD: 19.4 % — HIGH (ref 10.3–14.5)
RBC # FLD: 19.7 % — HIGH (ref 10.3–14.5)
SODIUM SERPL-SCNC: 139 MMOL/L — SIGNIFICANT CHANGE UP (ref 135–145)
WBC # BLD: 2.35 K/UL — LOW (ref 3.8–10.5)
WBC # BLD: 3.42 K/UL — LOW (ref 3.8–10.5)
WBC # FLD AUTO: 2.35 K/UL — LOW (ref 3.8–10.5)
WBC # FLD AUTO: 3.42 K/UL — LOW (ref 3.8–10.5)

## 2024-08-01 PROCEDURE — 99232 SBSQ HOSP IP/OBS MODERATE 35: CPT

## 2024-08-01 PROCEDURE — 74183 MRI ABD W/O CNTR FLWD CNTR: CPT | Mod: 26

## 2024-08-01 PROCEDURE — 99497 ADVNCD CARE PLAN 30 MIN: CPT | Mod: 25

## 2024-08-01 PROCEDURE — 99232 SBSQ HOSP IP/OBS MODERATE 35: CPT | Mod: GC

## 2024-08-01 PROCEDURE — 99223 1ST HOSP IP/OBS HIGH 75: CPT

## 2024-08-01 RX ORDER — BACTERIOSTATIC SODIUM CHLORIDE 0.9 %
1000 VIAL (ML) INJECTION
Refills: 0 | Status: COMPLETED | OUTPATIENT
Start: 2024-08-01 | End: 2024-08-02

## 2024-08-01 RX ORDER — HYDROMORPHONE HCL IN 0.9% NACL 0.2 MG/ML
0.5 PLASTIC BAG, INJECTION (ML) INTRAVENOUS ONCE
Refills: 0 | Status: DISCONTINUED | OUTPATIENT
Start: 2024-08-01 | End: 2024-08-01

## 2024-08-01 RX ORDER — HYDROMORPHONE HCL IN 0.9% NACL 0.2 MG/ML
30 PLASTIC BAG, INJECTION (ML) INTRAVENOUS
Refills: 0 | Status: DISCONTINUED | OUTPATIENT
Start: 2024-08-01 | End: 2024-08-07

## 2024-08-01 RX ORDER — HYDROMORPHONE HCL IN 0.9% NACL 0.2 MG/ML
2 PLASTIC BAG, INJECTION (ML) INTRAVENOUS ONCE
Refills: 0 | Status: DISCONTINUED | OUTPATIENT
Start: 2024-08-01 | End: 2024-08-01

## 2024-08-01 RX ORDER — HYDROMORPHONE HCL IN 0.9% NACL 0.2 MG/ML
2 PLASTIC BAG, INJECTION (ML) INTRAVENOUS
Refills: 0 | Status: DISCONTINUED | OUTPATIENT
Start: 2024-08-01 | End: 2024-08-03

## 2024-08-01 RX ADMIN — MEMANTINE HYDROCHLORIDE 10 MILLIGRAM(S): 14 CAPSULE, EXTENDED RELEASE ORAL at 05:18

## 2024-08-01 RX ADMIN — Medication 30 MILLILITER(S): at 17:45

## 2024-08-01 RX ADMIN — Medication 2.5 MILLIGRAM(S): at 21:37

## 2024-08-01 RX ADMIN — OCTREOTIDE ACETATE 100 MICROGRAM(S): 200 INJECTION INTRAVENOUS at 17:11

## 2024-08-01 RX ADMIN — Medication 80 MILLIGRAM(S): at 21:37

## 2024-08-01 RX ADMIN — Medication 10 MILLIGRAM(S): at 13:51

## 2024-08-01 RX ADMIN — Medication 10 MILLIGRAM(S): at 08:14

## 2024-08-01 RX ADMIN — Medication 30 MILLILITER(S): at 19:26

## 2024-08-01 RX ADMIN — Medication 0.5 MILLIGRAM(S): at 14:37

## 2024-08-01 RX ADMIN — Medication 200 MILLIGRAM(S): at 21:38

## 2024-08-01 RX ADMIN — OCTREOTIDE ACETATE 100 MICROGRAM(S): 200 INJECTION INTRAVENOUS at 05:17

## 2024-08-01 RX ADMIN — Medication 2 MILLIGRAM(S): at 12:14

## 2024-08-01 RX ADMIN — CHLORHEXIDINE GLUCONATE 1 APPLICATION(S): 500 CLOTH TOPICAL at 05:33

## 2024-08-01 RX ADMIN — METHYLPHENIDATE HYDROCHLORIDE 20 MILLIGRAM(S): 18 TABLET, EXTENDED RELEASE ORAL at 05:34

## 2024-08-01 RX ADMIN — Medication 2 MILLIGRAM(S): at 06:17

## 2024-08-01 RX ADMIN — PANTOPRAZOLE SODIUM 40 MILLIGRAM(S): 20 TABLET, DELAYED RELEASE ORAL at 05:18

## 2024-08-01 RX ADMIN — Medication 0.5 MILLIGRAM(S): at 13:51

## 2024-08-01 RX ADMIN — Medication 2 MILLIGRAM(S): at 05:17

## 2024-08-01 RX ADMIN — Medication 4 MILLIGRAM(S): at 13:51

## 2024-08-01 RX ADMIN — Medication 30 MILLILITER(S): at 13:40

## 2024-08-01 RX ADMIN — Medication 200 MILLIGRAM(S): at 05:26

## 2024-08-01 RX ADMIN — Medication 2 MILLIGRAM(S): at 11:14

## 2024-08-01 RX ADMIN — METHYLPHENIDATE HYDROCHLORIDE 20 MILLIGRAM(S): 18 TABLET, EXTENDED RELEASE ORAL at 11:14

## 2024-08-01 RX ADMIN — Medication 200 MILLIGRAM(S): at 13:51

## 2024-08-01 RX ADMIN — PANTOPRAZOLE SODIUM 40 MILLIGRAM(S): 20 TABLET, DELAYED RELEASE ORAL at 17:11

## 2024-08-01 RX ADMIN — Medication 30 MILLILITER(S): at 23:30

## 2024-08-01 RX ADMIN — Medication 15 MILLIGRAM(S): at 21:36

## 2024-08-01 RX ADMIN — Medication 20 GRAM(S): at 13:51

## 2024-08-01 RX ADMIN — MEMANTINE HYDROCHLORIDE 10 MILLIGRAM(S): 14 CAPSULE, EXTENDED RELEASE ORAL at 17:11

## 2024-08-01 NOTE — CONSULT NOTE ADULT - CONVERSATION DETAILS
Met with pt with medical resident Dr Melo to introduce role and scope of palliative care team as supportive in the setting of complex comorbidities/serious illnesses, to assist and navigate with complex medical decision making and symptoms during their hospital stay here. Pt was engaged in conversation. She expressed familiarity with palliative care as she follows with palliative Dr Benson at [facility] office. We explored her baseline function, family support, comorbidities including but not limited to metastatic breast cancer and plan of care moving forward. She currently resides with her parents and son LUIS (7 years old) is currently staying with them as well while he attend summer camp. Son had been staying with father during school year. Pt is currently going thru a divorce with her . She states that at baseline she is moderately independently, able to perform most ADLs, has chronic pain. She states pain is in her lower back, pelvis, hip region but limits the use of her breakthrough oral Dilaudid to bedtime to avoid any potential adverse effects that can limit her day to day function with her family/son. We spoke about her metastatic cancer with last reported chemotherapy in April 2024 as subsequent cycles have been cancelled or delayed due to acute anemia, recurrent hospitalization and poor functional status with risk outweighing benefits. She express frustration with acute complications as she is aware that without ongoing treatment, the cancer is likely to continue to get worse. She remains hopeful to be able to continue diease directed therapies. GOC: no limitation to care, full code.       Reviewed any prior Health Care Proxy (HCP) / Living Will/ Advance Directives or Medical Order for Life Sustaining Treatments (MOLST) Forms.   - has completed a prior HCP desigated to her sister Arianne    Significant psychosocial support provided and all questions answered.

## 2024-08-01 NOTE — PROGRESS NOTE ADULT - SUBJECTIVE AND OBJECTIVE BOX
Chief Complaint:  Patient is a 38y old  Female who presents with a chief complaint of Low blood count (31 Jul 2024 12:36)      HPI/ 24 hr events: Patient seen and examined at bedside. Pt feeling well this morning. Tolerating clear liquid diet. BM. Denies nausea, vomiting, diarrhea, abdominal pain, hematemesis, hematochezia, melena. Vitals are overall stable, no leukocytosis, Hgb, LFTs stable.    REVIEW OF SYSTEMS:   General: Negative  HEENT: Negative  CV: Negative  Respiratory: Negative  GI: See HPI  : Negative  MSK: Negative  Hematologic: Negative  Skin: Negative    MEDICATIONS:   MEDICATIONS  (STANDING):  chlorhexidine 2% Cloths 1 Application(s) Topical <User Schedule>  FLUoxetine 80 milliGRAM(s) Oral at bedtime  heparin  Lock Flush 100 Units/mL Injectable 300 Unit(s) IV Push once  lactulose Syrup 20 Gram(s) Oral three times a day  memantine 10 milliGRAM(s) Oral two times a day  methadone    Tablet 10 milliGRAM(s) Oral <User Schedule>  methadone    Tablet 15 milliGRAM(s) Oral at bedtime  methylphenidate 20 milliGRAM(s) Oral <User Schedule>  octreotide  Injectable 100 MICROGram(s) SubCutaneous two times a day  OLANZapine 2.5 milliGRAM(s) Oral at bedtime  pantoprazole    Tablet 40 milliGRAM(s) Oral two times a day  pregabalin 200 milliGRAM(s) Oral three times a day    MEDICATIONS  (PRN):  acetaminophen     Tablet .. 650 milliGRAM(s) Oral every 6 hours PRN Temp greater or equal to 38C (100.4F), Mild Pain (1 - 3)  diazepam    Tablet 2 milliGRAM(s) Oral two times a day PRN for anxiety and/or muscle spasm  HYDROmorphone   Tablet 2 milliGRAM(s) Oral every 4 hours PRN Moderate Pain (4 - 6)  ondansetron Injectable 4 milliGRAM(s) IV Push every 6 hours PRN Nausea and/or Vomiting      Packed Red Cells Order:  1 Unit  Indication: Hgb <7 gm/dL  Infuse Unit : 3.5 Hours (07-29-24 @ 20:24)  Packed Red Cells Order:  1 Unit  Indication: Hgb <7 gm/dL  Infuse Unit : 3 Hours (07-29-24 @ 15:11)  Packed Red Cells Order:  1 Unit  Indication: Hgb <7 gm/dL  Infuse Unit : 3 Hours (07-29-24 @ 15:11)  Packed Red Cells Order:  1 Unit  Indication: Hgb <7 gm/dL  Infuse Unit : 3.5 Hours (07-29-24 @ 15:05)  Packed Red Cells Order:  1 Unit  Indication: Hgb <7 gm/dL  Infuse Unit : 3.5 Hours (07-29-24 @ 15:05)      DIET:  Diet, Clear Liquid (07-31-24 @ 16:00) [Active]          ALLERGIES:   Allergies    pertuzumab (Other (Severe))  Perjeta (Other (Severe))    Intolerances        VITAL SIGNS:   Vital Signs Last 24 Hrs  T(C): 36.4 (01 Aug 2024 04:55), Max: 37 (31 Jul 2024 13:24)  T(F): 97.5 (01 Aug 2024 04:55), Max: 98.6 (31 Jul 2024 13:24)  HR: 66 (01 Aug 2024 04:55) (66 - 82)  BP: 109/73 (01 Aug 2024 04:55) (109/73 - 115/78)  BP(mean): --  RR: 18 (01 Aug 2024 04:55) (17 - 18)  SpO2: 100% (01 Aug 2024 04:55) (95% - 100%)    Parameters below as of 01 Aug 2024 04:55  Patient On (Oxygen Delivery Method): room air      I&O's Summary    31 Jul 2024 07:01  -  01 Aug 2024 07:00  --------------------------------------------------------  IN: 350 mL / OUT: 0 mL / NET: 350 mL        PHYSICAL EXAM:   GENERAL:  No acute distress  HEENT:  NC/AT, conjunctiva clear, sclera anicteric  CHEST:  No increased effort  HEART:  Regular rate  ABDOMEN:  Soft, non-tender, non-distended, no rebound or guarding  EXTREMITIES: No edema  SKIN:  Warm, dry  NEURO:  Calm, cooperative    LABS:                        9.7    2.35  )-----------( 133      ( 01 Aug 2024 05:39 )             31.7     Hemoglobin: 9.7 g/dL (08-01-24 @ 05:39)  Hemoglobin: 9.4 g/dL (07-31-24 @ 05:05)  Hemoglobin: 10.0 g/dL (07-30-24 @ 07:53)  Hemoglobin: 5.1 g/dL (07-29-24 @ 14:10)    08-01    139  |  108  |  6.2<L>  ----------------------------<  70  4.8   |  22.0  |  0.30<L>    Ca    8.2<L>      01 Aug 2024 05:39  Mg     1.9     07-31    TPro  5.2<L>  /  Alb  2.8<L>  /  TBili  1.1  /  DBili  x   /  AST  62<H>  /  ALT  31  /  AlkPhos  226<H>  08-01    LIVER FUNCTIONS - ( 01 Aug 2024 05:39 )  Alb: 2.8 g/dL / Pro: 5.2 g/dL / ALK PHOS: 226 U/L / ALT: 31 U/L / AST: 62 U/L / GGT: x             PT/INR - ( 31 Jul 2024 05:05 )   PT: 12.3 sec;   INR: 1.11 ratio        RADIOLOGY & ADDITIONAL STUDIES:      ACC: [id number] EXAM:  CT ABDOMEN AND PELVIS IC   ORDERED BY: TENA MUNSON     PROCEDURE DATE:  07/30/2024          INTERPRETATION:  CLINICAL INFORMATION: Left lower quadrant pain.   Metastatic breast cancer.    COMPARISON: CT pulmonary angiogram July 21, 2024, contrast-enhanced CT of   the abdomen and pelvis June 14, 2024.    CONTRAST/COMPLICATIONS:  IV Contrast: Omnipaque 350  90 cc administered   10 cc discarded  Oral Contrast: NONE  Complications: None reported at time of study completion    PROCEDURE:  CT of the Abdomen and Pelvis was performed.  Sagittal and coronal reformats were performed.    FINDINGS:  LOWER CHEST: Central venous catheter tip in the right atrium. Again is   noted coarse parenchymal opacity in the left upper lobe and mild   peribronchovascular opacities in the left lower lobe. There is a mild   layering left pleural effusion.    LIVER: Within normal limits. Hepatic veins and portal vein are patent.  BILE DUCTS: Normal caliber.  GALLBLADDER: Distended.  SPLEEN: The spleen is increased in size to 14.5 cm in maximal axial   dimension. The splenic artery and vein are patent.  PANCREAS: Within normal limits.  ADRENALS: Within normal limits.  KIDNEYS/URETERS: Within normal limits.    BLADDER: Within normal limits.  REPRODUCTIVE ORGANS: Uterus and adnexa within normal limits.    BOWEL: No bowel obstruction. Appendix is normal.  PERITONEUM/RETROPERITONEUM: Mild ascites.  VESSELS: Within normal limits.  LYMPH NODES: No lymphadenopathy.  ABDOMINAL WALL: Within normal limits.  BONES: Again is noted extensive mixed lytic and blastic osseous   metastatic disease, status post vertebroplasty at T12 and L1.    IMPRESSION: Mild ascites. Splenomegaly.    --- End of Report ---        EGD 7/31/24  Impressions:  Normal esophagus.  Normal duodenum.  Friability. There were 2 erosions which were oozing. There were small  erythematous spots in the antrum as well. In the antrum.     Chief Complaint:  Patient is a 38y old  Female who presents with a chief complaint of Low blood count (31 Jul 2024 12:36)    HPI/ 24 hr events: Patient seen and examined at bedside. Pt feeling well this morning. Tolerating clear liquid diet. BM. Denies nausea, vomiting, diarrhea, abdominal pain, hematemesis, hematochezia, melena. Vitals are overall stable, no leukocytosis, Hgb, LFTs stable.    REVIEW OF SYSTEMS:   General: Negative  HEENT: Negative  CV: Negative  Respiratory: Negative  GI: See HPI  : Negative  MSK: Negative  Hematologic: Negative  Skin: Negative    MEDICATIONS:   MEDICATIONS  (STANDING):  chlorhexidine 2% Cloths 1 Application(s) Topical <User Schedule>  FLUoxetine 80 milliGRAM(s) Oral at bedtime  heparin  Lock Flush 100 Units/mL Injectable 300 Unit(s) IV Push once  lactulose Syrup 20 Gram(s) Oral three times a day  memantine 10 milliGRAM(s) Oral two times a day  methadone    Tablet 10 milliGRAM(s) Oral <User Schedule>  methadone    Tablet 15 milliGRAM(s) Oral at bedtime  methylphenidate 20 milliGRAM(s) Oral <User Schedule>  octreotide  Injectable 100 MICROGram(s) SubCutaneous two times a day  OLANZapine 2.5 milliGRAM(s) Oral at bedtime  pantoprazole    Tablet 40 milliGRAM(s) Oral two times a day  pregabalin 200 milliGRAM(s) Oral three times a day    MEDICATIONS  (PRN):  acetaminophen     Tablet .. 650 milliGRAM(s) Oral every 6 hours PRN Temp greater or equal to 38C (100.4F), Mild Pain (1 - 3)  diazepam    Tablet 2 milliGRAM(s) Oral two times a day PRN for anxiety and/or muscle spasm  HYDROmorphone   Tablet 2 milliGRAM(s) Oral every 4 hours PRN Moderate Pain (4 - 6)  ondansetron Injectable 4 milliGRAM(s) IV Push every 6 hours PRN Nausea and/or Vomiting      Packed Red Cells Order:  1 Unit  Indication: Hgb <7 gm/dL  Infuse Unit : 3.5 Hours (07-29-24 @ 20:24)  Packed Red Cells Order:  1 Unit  Indication: Hgb <7 gm/dL  Infuse Unit : 3 Hours (07-29-24 @ 15:11)  Packed Red Cells Order:  1 Unit  Indication: Hgb <7 gm/dL  Infuse Unit : 3 Hours (07-29-24 @ 15:11)  Packed Red Cells Order:  1 Unit  Indication: Hgb <7 gm/dL  Infuse Unit : 3.5 Hours (07-29-24 @ 15:05)  Packed Red Cells Order:  1 Unit  Indication: Hgb <7 gm/dL  Infuse Unit : 3.5 Hours (07-29-24 @ 15:05)      DIET:  Diet, Clear Liquid (07-31-24 @ 16:00) [Active]    ALLERGIES:   Allergies  pertuzumab (Other (Severe))  Perjeta (Other (Severe))    Intolerances    VITAL SIGNS:   Vital Signs Last 24 Hrs  T(C): 36.4 (01 Aug 2024 04:55), Max: 37 (31 Jul 2024 13:24)  T(F): 97.5 (01 Aug 2024 04:55), Max: 98.6 (31 Jul 2024 13:24)  HR: 66 (01 Aug 2024 04:55) (66 - 82)  BP: 109/73 (01 Aug 2024 04:55) (109/73 - 115/78)  BP(mean): --  RR: 18 (01 Aug 2024 04:55) (17 - 18)  SpO2: 100% (01 Aug 2024 04:55) (95% - 100%)    Parameters below as of 01 Aug 2024 04:55  Patient On (Oxygen Delivery Method): room air      I&O's Summary    31 Jul 2024 07:01  -  01 Aug 2024 07:00  --------------------------------------------------------  IN: 350 mL / OUT: 0 mL / NET: 350 mL        PHYSICAL EXAM:   GENERAL:  No acute distress  HEENT:  NC/AT, conjunctiva clear, sclera anicteric  CHEST:  No increased effort  HEART:  Regular rate  ABDOMEN:  Soft, non-tender, non-distended, no rebound or guarding  EXTREMITIES: No edema  SKIN:  Warm, dry  NEURO:  Calm, cooperative    LABS:                        9.7    2.35  )-----------( 133      ( 01 Aug 2024 05:39 )             31.7     Hemoglobin: 9.7 g/dL (08-01-24 @ 05:39)  Hemoglobin: 9.4 g/dL (07-31-24 @ 05:05)  Hemoglobin: 10.0 g/dL (07-30-24 @ 07:53)  Hemoglobin: 5.1 g/dL (07-29-24 @ 14:10)    08-01    139  |  108  |  6.2<L>  ----------------------------<  70  4.8   |  22.0  |  0.30<L>    Ca    8.2<L>      01 Aug 2024 05:39  Mg     1.9     07-31    TPro  5.2<L>  /  Alb  2.8<L>  /  TBili  1.1  /  DBili  x   /  AST  62<H>  /  ALT  31  /  AlkPhos  226<H>  08-01    LIVER FUNCTIONS - ( 01 Aug 2024 05:39 )  Alb: 2.8 g/dL / Pro: 5.2 g/dL / ALK PHOS: 226 U/L / ALT: 31 U/L / AST: 62 U/L / GGT: x             PT/INR - ( 31 Jul 2024 05:05 )   PT: 12.3 sec;   INR: 1.11 ratio        RADIOLOGY & ADDITIONAL STUDIES:      ACC: [id number] EXAM:  CT ABDOMEN AND PELVIS IC   ORDERED BY: TENA MUNSON     PROCEDURE DATE:  07/30/2024          INTERPRETATION:  CLINICAL INFORMATION: Left lower quadrant pain.   Metastatic breast cancer.    COMPARISON: CT pulmonary angiogram July 21, 2024, contrast-enhanced CT of   the abdomen and pelvis June 14, 2024.    CONTRAST/COMPLICATIONS:  IV Contrast: Omnipaque 350  90 cc administered   10 cc discarded  Oral Contrast: NONE  Complications: None reported at time of study completion    PROCEDURE:  CT of the Abdomen and Pelvis was performed.  Sagittal and coronal reformats were performed.    FINDINGS:  LOWER CHEST: Central venous catheter tip in the right atrium. Again is   noted coarse parenchymal opacity in the left upper lobe and mild   peribronchovascular opacities in the left lower lobe. There is a mild   layering left pleural effusion.    LIVER: Within normal limits. Hepatic veins and portal vein are patent.  BILE DUCTS: Normal caliber.  GALLBLADDER: Distended.  SPLEEN: The spleen is increased in size to 14.5 cm in maximal axial   dimension. The splenic artery and vein are patent.  PANCREAS: Within normal limits.  ADRENALS: Within normal limits.  KIDNEYS/URETERS: Within normal limits.    BLADDER: Within normal limits.  REPRODUCTIVE ORGANS: Uterus and adnexa within normal limits.    BOWEL: No bowel obstruction. Appendix is normal.  PERITONEUM/RETROPERITONEUM: Mild ascites.  VESSELS: Within normal limits.  LYMPH NODES: No lymphadenopathy.  ABDOMINAL WALL: Within normal limits.  BONES: Again is noted extensive mixed lytic and blastic osseous   metastatic disease, status post vertebroplasty at T12 and L1.    IMPRESSION: Mild ascites. Splenomegaly.    --- End of Report ---        EGD 7/31/24  Impressions:  Normal esophagus.  Normal duodenum.  Friability. There were 2 erosions which were oozing. There were small  erythematous spots in the antrum as well. In the antrum.

## 2024-08-01 NOTE — PROGRESS NOTE ADULT - SUBJECTIVE AND OBJECTIVE BOX
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 On Octerotide LD 7/18 and iv iron LD weekly LD 7/18 including prior w/u with GI (noted to have telangiectasias on EGD / GAVE) , Sent form the office for a 10 lb weight gain anf hgb of 5.7 Placed in obs Hgb 5.1 S/P 3 U PRNC hgb now 10    PAST MEDICAL & SURGICAL HISTORY:  H/O compression fracture of spine  Anxiety  Metastatic breast cancer  H/O pleural effusion  Pericardial effusion  S/P tonsillectomy  H/O chest tube placement  12/23/21  S/P pericardiocentesis  12/28/21    Allergies  pertuzumab (Other (Severe))  Perjeta (Other (Severe))    MEDICATIONS  (STANDING):  heparin  Lock Flush 100 Units/mL Injectable 300 Unit(s) IV Push once  polyethylene glycol 3350 17 Gram(s) Oral daily  senna 2 Tablet(s) Oral at bedtime    MEDICATIONS  (PRN):  HYDROmorphone  Injectable 2 milliGRAM(s) IV Push every 2 hours PRN Severe Pain (7 - 10)    Vital Signs Last 24 Hrs  T(C): 36.4 (01 Aug 2024 04:55), Max: 37 (31 Jul 2024 13:24)  T(F): 97.5 (01 Aug 2024 04:55), Max: 98.6 (31 Jul 2024 13:24)  HR: 66 (01 Aug 2024 04:55) (66 - 82)  BP: 109/73 (01 Aug 2024 04:55) (109/73 - 115/78)  BP(mean): --  RR: 18 (01 Aug 2024 04:55) (17 - 18)  SpO2: 100% (01 Aug 2024 04:55) (95% - 100%)    Parameters below as of 01 Aug 2024 04:55  Patient On (Oxygen Delivery Method): room air      PHYSICAL EXAM:   Gen: NAD, pallor  Head: NC/AT  Neck: supple  Card: regular rate and rhythm. Mild ankle edema bilaterally.  Resp:  CTAB  Abd: soft, non-distended   Ext: DENNEY well  Neuro:  A&O,   Skin:  Warm and dry   Psych:  Normal affect and mood       CBC                             9.7    2.35  )-----------( 133      ( 01 Aug 2024 05:39 )             31.7                           9.4    3.29  )-----------( 144      ( 31 Jul 2024 05:05 )             29.2                10.0   3.50  )-----------( 132      ( 30 Jul 2024 07:53 )             30.4   CHEM  08-01    139  |  108  |  6.2<L>  ----------------------------<  70  4.8   |  22.0  |  0.30<L>    Ca    8.2<L>      01 Aug 2024 05:39  Mg     1.9     07-31    TPro  5.2<L>  /  Alb  2.8<L>  /  TBili  1.1  /  DBili  x   /  AST  62<H>  /  ALT  31  /  AlkPhos  226<H>  08-01 07-31    140  |  106  |  6.6<L>  ----------------------------<  86  4.0   |  27.0  |  0.48<L>    Ca    8.2<L>      31 Jul 2024 05:05  Mg     1.9     07-31    TPro  4.7<L>  /  Alb  2.7<L>  /  TBili  1.0  /  DBili  x   /  AST  81<H>  /  ALT  38<H>  /  AlkPhos  251<H>  07-31 07-29    139  |  108  |  8.8  ----------------------------<  116<H>  3.8   |  26.0  |  0.36<L>    Ca    7.2<L>      29 Jul 2024 14:10    TPro  4.1<L>  /  Alb  2.4<L>  /  TBili  0.4  /  DBili  x   /  AST  58<H>  /  ALT  28  /  AlkPhos  184<H>  07-29

## 2024-08-01 NOTE — CONSULT NOTE ADULT - SUBJECTIVE AND OBJECTIVE BOX
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 Chapa (Hem / Onc) for anemia. As per patient, after being discharged from the hospital -- she was doing OK however she was feeling bloated and very winded. Her stools are loose due to stool softeners and lately she noticed stool color is dark. She went for blood work and was found to have Hb of 5.7 so she was sent to [facility].  In ER, H&H 5.1/16.9, she was given 3 PRBCs. Repeat H&H 10/30.4. Dr. Chapa recommended CT Abd / Pelvis and GI Evaluation and admission for close monitoring for 48 hours.    (30 Jul 2024 12:01)          Analgesic Dosing for past 24 hours reviewed as below:    FLUoxetine   80 milliGRAM(s) Oral (07-31-24 @ 21:49)    HYDROmorphone  Injectable   2 milliGRAM(s) IV Push (08-01-24 @ 05:17)    HYDROmorphone  Injectable   2 milliGRAM(s) IV Push (07-31-24 @ 21:47)   2 milliGRAM(s) IV Push (07-31-24 @ 17:48)   2 milliGRAM(s) IV Push (07-31-24 @ 13:25)    HYDROmorphone  Injectable   2 milliGRAM(s) IV Push (08-01-24 @ 11:14)    memantine   10 milliGRAM(s) Oral (08-01-24 @ 05:18)   10 milliGRAM(s) Oral (07-31-24 @ 17:48)    methadone    Tablet   10 milliGRAM(s) Oral (08-01-24 @ 08:14)   10 milliGRAM(s) Oral (07-31-24 @ 13:25)    methadone    Tablet   15 milliGRAM(s) Oral (07-31-24 @ 21:43)    methylphenidate   20 milliGRAM(s) Oral (08-01-24 @ 11:14)   20 milliGRAM(s) Oral (08-01-24 @ 05:34)    OLANZapine   2.5 milliGRAM(s) Oral (07-31-24 @ 21:48)    pregabalin   200 milliGRAM(s) Oral (08-01-24 @ 05:26)   200 milliGRAM(s) Oral (07-31-24 @ 21:47)   200 milliGRAM(s) Oral (07-31-24 @ 13:25)          T(C): 36.3 (08-01-24 @ 10:10), Max: 37 (07-31-24 @ 13:24)  HR: 68 (08-01-24 @ 11:10) (66 - 82)  BP: 102/68 (08-01-24 @ 11:10) (90/60 - 115/78)  RR: 18 (08-01-24 @ 11:10) (17 - 18)  SpO2: 95% (08-01-24 @ 11:10) (94% - 100%)      07-31-24 @ 07:01  -  08-01-24 @ 07:00  --------------------------------------------------------  IN: 350 mL / OUT: 0 mL / NET: 350 mL        acetaminophen     Tablet .. 650 milliGRAM(s) Oral every 6 hours PRN  chlorhexidine 2% Cloths 1 Application(s) Topical <User Schedule>  diazepam    Tablet 2 milliGRAM(s) Oral two times a day PRN  FLUoxetine 80 milliGRAM(s) Oral at bedtime  heparin  Lock Flush 100 Units/mL Injectable 300 Unit(s) IV Push once  HYDROmorphone   Tablet 2 milliGRAM(s) Oral every 4 hours PRN  HYDROmorphone  Injectable 2 milliGRAM(s) IV Push every 2 hours PRN  HYDROmorphone PCA (1 mG/mL) 30 milliLiter(s) PCA Continuous PCA Continuous  lactulose Syrup 20 Gram(s) Oral three times a day  memantine 10 milliGRAM(s) Oral two times a day  methadone    Tablet 10 milliGRAM(s) Oral <User Schedule>  methadone    Tablet 15 milliGRAM(s) Oral at bedtime  methylphenidate 20 milliGRAM(s) Oral <User Schedule>  octreotide  Injectable 100 MICROGram(s) SubCutaneous two times a day  OLANZapine 2.5 milliGRAM(s) Oral at bedtime  ondansetron Injectable 4 milliGRAM(s) IV Push every 6 hours PRN  pantoprazole    Tablet 40 milliGRAM(s) Oral two times a day  pregabalin 200 milliGRAM(s) Oral three times a day                          9.7    2.35  )-----------( 133      ( 01 Aug 2024 05:39 )             31.7     08-01    139  |  108  |  6.2<L>  ----------------------------<  70  4.8   |  22.0  |  0.30<L>    Ca    8.2<L>      01 Aug 2024 05:39  Mg     1.9     07-31    TPro  5.2<L>  /  Alb  2.8<L>  /  TBili  1.1  /  DBili  x   /  AST  62<H>  /  ALT  31  /  AlkPhos  226<H>  08-01    PT/INR - ( 31 Jul 2024 05:05 )   PT: 12.3 sec;   INR: 1.11 ratio           Urinalysis Basic - ( 01 Aug 2024 05:39 )    Color: x / Appearance: x / SG: x / pH: x  Gluc: 70 mg/dL / Ketone: x  / Bili: x / Urobili: x   Blood: x / Protein: x / Nitrite: x   Leuk Esterase: x / RBC: x / WBC x   Sq Epi: x / Non Sq Epi: x / Bacteria: x        Pain Service   [phone]

## 2024-08-01 NOTE — CONSULT NOTE ADULT - TIME BILLING
D/W pt, RN, hospitalist Dr Sandoval, CCM/SW, medical resident Dr Melo  Total time also includes discussion during interdisciplinary team rounds, chart review including but limited to prior admissions/ GOC discussions,  review of medications/ labs/ imaging, examination, care coordination with other health care professionals, documentation EXCLUDING current goals of care or advance care planning discussions.

## 2024-08-01 NOTE — PROGRESS NOTE ADULT - ASSESSMENT
6/3/2021 - Episode of transient vision change described as 3 circular black balls with coloration lasting only seconds. One episode associated with hypoglycemia. Although these could be presyncopal episodes, concern would be for a posterior circulation TIA. Has a history of A-Fib, but more recent Holter no significant episodes and according to patient aslo had carotid studies that were unremarkable. Started an ASA per day and no episodes since. Thus to be complete recommend that Dr Lombard precede with Cardiac Echo to rule out valvular disease or right to left shunt. Recommended continuing on the ASA and if episodes return would need to consider MRI/ MRA looking for other changes or posterior circulation disease and refer to stroke neurologist.    Patient is a 38y woman presenting to the ER for recurrent anemia. Patient is familiar to the GI department and has gotten an endoscopic study early in 7/2024 which showed resolution of GAVE but red spots in the antrum and the duodenum. Repeat EGD from 7/31/2024 showed normal esophagus and duodenum, and 2 erosion that were oozing in the antrum with small erythematous spots. Currently pending MRCP.    #Anemia  - S/p EGO and cauterization of oozing erosions in the antrum  - Trend H/H  - Continue protonix 40mg BID    #Ascites  - F/u MRCP   Patient is a 38y woman presenting to the ER for recurrent anemia. Patient is familiar to the GI department and has gotten an endoscopic study early in 7/2024 which showed resolution of GAVE but red spots in the antrum and the duodenum. Repeat EGD from 7/31/2024 showed normal esophagus and duodenum, and 2 erosion that were oozing in the antrum with small erythematous spots. Currently pending MRCP.    #Anemia  - S/p EGO and cauterization of oozing erosions in the antrum  - Trend H/H  - Continue protonix 40mg BID    #Ascites  - F/u MRI abdomen   Patient is a 38y woman presenting to the ER for recurrent anemia. Patient is familiar to the GI department and has gotten an endoscopic study early in 7/2024 which showed resolution of GAVE but red spots in the antrum and the duodenum. Repeat EGD from 7/31/2024 showed normal esophagus and duodenum, and 2 erosion that were oozing in the antrum with small erythematous spots. Currently pending MRI abdomen.    #Anemia  - S/p EGO and cauterization of oozing erosions in the antrum  - Trend H/H  - Continue protonix 40mg BID    #Ascites  - F/u MRI abdomen

## 2024-08-01 NOTE — PROGRESS NOTE ADULT - ASSESSMENT
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 Decrease in size of multiple enhancing infra and supratentorial lesions. On surveillance.  - Last PET on 4/3/24 showed Asymmetric foci of hypermetabolism in the right cerebellum corresponding to lesions evident on concurrent MRI.Very mild non significant hypermetabolism associated with some of multiple mixed lytic/sclerotic lesions throughout skeleton.  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 There were 2 erosions which were oozing. There were small erythematous spots in the antrum as well. In the antrum. cauterization of oozing erosions in the antrum  -CT Abd / Pelvis  Mild ascites. Splenomegaly. LIVER: Within normal limits. Hepatic veins and portal vein are patent.    - Ammonia level 81   - Ammonia concentration increases in red cell units (RBCs) during storage and pt has been getting frequent transfusions?   - ferritin 349 ALK Phos  213 ( known bone mets ) AST 77 ALT 37 Drawn in the office on 7/29   - awaiting MRI ABD w and W/O contrast to evaluate liver     CBC is stable   WBC 2.35 Hgb 9.7 Plt 133k     Will follow

## 2024-08-01 NOTE — PROGRESS NOTE ADULT - SUBJECTIVE AND OBJECTIVE BOX
Patient is a 38y old  Female who presents with a chief complaint of Low blood count (01 Aug 2024 13:03)    INTERVAL HPI/OVERNIGHT EVENTS: No acute events overnight. HD stable.     MEDICATIONS  (STANDING):  chlorhexidine 2% Cloths 1 Application(s) Topical <User Schedule>  FLUoxetine 80 milliGRAM(s) Oral at bedtime  heparin  Lock Flush 100 Units/mL Injectable 300 Unit(s) IV Push once  HYDROmorphone PCA (1 mG/mL) 30 milliLiter(s) PCA Continuous PCA Continuous  lactulose Syrup 20 Gram(s) Oral three times a day  memantine 10 milliGRAM(s) Oral two times a day  methadone    Tablet 10 milliGRAM(s) Oral <User Schedule>  methadone    Tablet 15 milliGRAM(s) Oral at bedtime  methylphenidate 20 milliGRAM(s) Oral <User Schedule>  octreotide  Injectable 100 MICROGram(s) SubCutaneous two times a day  OLANZapine 2.5 milliGRAM(s) Oral at bedtime  pantoprazole    Tablet 40 milliGRAM(s) Oral two times a day  pregabalin 200 milliGRAM(s) Oral three times a day  sodium chloride 0.9%. 1000 milliLiter(s) (75 mL/Hr) IV Continuous <Continuous>    MEDICATIONS  (PRN):  acetaminophen     Tablet .. 650 milliGRAM(s) Oral every 6 hours PRN Temp greater or equal to 38C (100.4F), Mild Pain (1 - 3)  diazepam    Tablet 2 milliGRAM(s) Oral two times a day PRN for anxiety and/or muscle spasm  HYDROmorphone   Tablet 2 milliGRAM(s) Oral every 4 hours PRN Moderate Pain (4 - 6)  HYDROmorphone  Injectable 2 milliGRAM(s) IV Push every 2 hours PRN Severe Pain (7 - 10)  ondansetron Injectable 4 milliGRAM(s) IV Push every 6 hours PRN Nausea and/or Vomiting      Allergies    pertuzumab (Other (Severe))  Perjeta (Other (Severe))    Intolerances        REVIEW OF SYSTEMS: all negative with exception of above    Vital Signs Last 24 Hrs  T(C): 36.3 (01 Aug 2024 10:10), Max: 36.6 (31 Jul 2024 17:30)  T(F): 97.4 (01 Aug 2024 10:10), Max: 97.9 (31 Jul 2024 17:30)  HR: 68 (01 Aug 2024 11:10) (66 - 76)  BP: 102/68 (01 Aug 2024 11:10) (90/60 - 115/78)  BP(mean): --  RR: 18 (01 Aug 2024 11:10) (17 - 18)  SpO2: 95% (01 Aug 2024 11:10) (94% - 100%)    Parameters below as of 01 Aug 2024 11:10  Patient On (Oxygen Delivery Method): room air        PHYSICAL EXAM:  General: Young female sitting in bed comfortably. No acute distress  HEENT: EOMI. Clear conjunctivae. Moist mucus membrane. Hard of hearing.   Neck: Supple.   Chest: Good air entry. No wheezing, rales or rhonchi. Chemo port in right upper chest.   Heart: S1 & S2 with RRR.  Abdomen: Non distended. Soft. Mildly tender on left side. + BS  Ext: No pedal edema. No calf tenderness   Neuro: Awake. Moves all extremities. Speech clear.   Skin: Warm and Dry  Psychiatry: Normal mood and affect    LABS:                        9.7    2.35  )-----------( 133      ( 01 Aug 2024 05:39 )             31.7     08-01    139  |  108  |  6.2<L>  ----------------------------<  70  4.8   |  22.0  |  0.30<L>    Ca    8.2<L>      01 Aug 2024 05:39  Mg     1.9     07-31    TPro  5.2<L>  /  Alb  2.8<L>  /  TBili  1.1  /  DBili  x   /  AST  62<H>  /  ALT  31  /  AlkPhos  226<H>  08-01    PT/INR - ( 31 Jul 2024 05:05 )   PT: 12.3 sec;   INR: 1.11 ratio           Urinalysis Basic - ( 01 Aug 2024 05:39 )    Color: x / Appearance: x / SG: x / pH: x  Gluc: 70 mg/dL / Ketone: x  / Bili: x / Urobili: x   Blood: x / Protein: x / Nitrite: x   Leuk Esterase: x / RBC: x / WBC x   Sq Epi: x / Non Sq Epi: x / Bacteria: x      CAPILLARY BLOOD GLUCOSE          RADIOLOGY & ADDITIONAL TESTS:    Imaging Personally Reviewed:  [ ] YES  [ ] NO    Consultant(s) Notes Reviewed:  [ ] YES  [ ] NO    Care Discussed with Consultants/Other Providers [ ] YES  [ ] NO

## 2024-08-01 NOTE — PROGRESS NOTE ADULT - ASSESSMENT
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 Chapa (Hem / Onc) for anemia. As per patient, after being discharged from the hospital -- she was doing OK however she was feeling bloated and very winded. Her stools are loose due to stool softeners and lately she noticed stool color is dark. She went for blood work and was found to have Hb of 5.7 so she was sent to [facility].  In ER, H&H 5.1/16.9, she was given 3 PRBCs. Repeat H&H 10/30.4. Dr. Chapa recommended CT Abd / Pelvis and GI Evaluation and admission for close monitoring for 48 hours.     1) Acute on Chronic Anemia due to GAVE  - History of GAVE  - EGD on 7/3: There were many, minute, diffusely scattered red spots in the antrum. Previously seen GAVE improved/resolved. Scattered stellate scars seen consistent with healing. Moderately erythematous mucosa consistent with gastritis. There were many, minute, diffusely scattered red spots throughout the first and second portions of the duodenum. No source of anemia or possible dark stools seen on upper endoscopy. Source of scant red blood seen is likely from known hemorrhoids. Profound anemia is unlikely to be attributable to GI losses.   - She is also on Enhertu, which can cause anemia  - CT Abd / Pelvis is pending   - GI was consulted by ED  - s/p 3 PRBCs  - Gets Venofer and Aranesp as an outpatient  - Monitor H&H  - Hem/Onc following   - Appreciate GI recs- S/p EGD w/ 2 oozing erosions  - F/u MRI abdomen    2) Metastatic Breast Cancer   - On Enhertu (last dose 6/19)  - Hem / Onc following  - Outpatient follow up with Rad / Onc  - Palliative recs appreciated  - Appreciate pain management recs- started PCA pump    3) Thrombocytopenia  - Chronic and intermittent   - Likely due to Enhertu  - Monitor     4) Anxiety / Mood Disorder  - Continue Fluoxetine, Olanzapine and Diazepam (PRN)    DVT Prophylaxis -- Venodyne     Dispo: Home once stable.

## 2024-08-01 NOTE — CONSULT NOTE ADULT - SUBJECTIVE AND OBJECTIVE BOX
[facility] PALLIATIVE MEDICINE CONSULT    CC: Patient is a 38y old  Female who presents with a chief complaint of Low blood count (01 Aug 2024 13:27)      HPI:  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),  Pericardial Effusion, Spinal Compression Fractures and recent hospitalizations (last one 10 days ago, discharged on 7/25/24) sent by Dr. Chapa (Hem / Onc) for anemia. As per patient, after being discharged from the hospital -- she was doing OK however she was feeling bloated and very winded. Her stools are loose due to stool softeners and lately she noticed stool color is dark. She went for blood work and was found to have Hb of 5.7 so she was sent to [facility]. In ER, H&H 5.1/16.9, she was given 3 PRBCs. Repeat H&H 10/30.4. Dr. Chapa recommended CT Abd / Pelvis and GI Evaluation and admission for close monitoring for 48 hours.    (30 Jul 2024 12:01)    Mrs. Dave is a 38 year old female with PMHx noted above and multiple rehospitalization with most recent at [facility] (7/21- 7/25/24) sent by oncologist for profound acute anemia, found on lab workup with H/H 5.1/16.9. S/P 3 units of PRBC transfusion with appropriate response. Admitted for further workup and monitoring. She was evaluated by GI and s/p EGD 7/31 with notable friability and 2 erosions in the antrum and duodenum s/p electrocautery ablation. She was also noted to have portal hypertension on endoscopy and currently pending MRI abdomen for further eval. Palliative consulted for support and to assist with ongoing goals of care.     Pt seen earlier today with medical resident Dr Melo  Pt resting comfortably in bed.     Present Symptoms:     Dyspnea: no  Nausea/Vomiting:  No  Anxiety:  bNo  Depression:  No  Fatigue:  No  Loss of appetite: Yes  Constipation:  No, had BM earlier this AM    Pain: chronic hip, pelvic and lower back pain due to known bone mets            Character- sharp and dull            Duration- intermittent             Effect-            Factors- exacerbated by certain posture             Frequency-            Location- lower back, pelvis, hip            Severity- moderate    Pain AD Score:  http://geriatrictoolkit.[place].[facility]/cog/painad.pdf (press ctrl + left click to view)    Review of Systems: Reviewed                     Negative: no chest pain or dyspnea at rest                     Positive: see above  All others negative    PERTINENT PMH REVIEWED: Yes      PAST MEDICAL & SURGICAL HISTORY:  H/O compression fracture of spine      Anxiety      Metastatic breast cancer      H/O pleural effusion      Pericardial effusion      S/P tonsillectomy      H/O chest tube placement  12/23/21      S/P pericardiocentesis  12/28/21            FAMILY HISTORY:  FH: CVA (cerebrovascular accident)        Allergies    pertuzumab (Other (Severe))  Perjeta (Other (Severe))    Intolerances        SOCIAL HISTORY:                      Substance history:                    Admitted from:  home with her parents                    Children:  1 son (7 years old)                    Oriental orthodox/spirituality:                    Cultural concerns:                    Currently going thru legal divorce from        DECISION MAKER(s):[] Health Care Proxy(s)  [] Surrogate(s)  [] Guardian           - HCP is her sister Arianne    ADVANCE DIRECTIVES/TREATMENT PREFERENCES: FULL CODE  DNR YES NO  Completed on:                     MOLST  YES NO   Completed on:  Living Will  YES NO   Completed on:    Karnofsky/Palliative Performance Status Version 2:  60  http://npcrc.org/files/news/palliative_performance_scale_ppsv2.pdf    Baseline ADLs (prior to admission): moderately Independent       MEDICATIONS  (STANDING):  chlorhexidine 2% Cloths 1 Application(s) Topical <User Schedule>  FLUoxetine 80 milliGRAM(s) Oral at bedtime  heparin  Lock Flush 100 Units/mL Injectable 300 Unit(s) IV Push once  HYDROmorphone PCA (1 mG/mL) 30 milliLiter(s) PCA Continuous PCA Continuous  lactulose Syrup 20 Gram(s) Oral three times a day  memantine 10 milliGRAM(s) Oral two times a day  methadone    Tablet 10 milliGRAM(s) Oral <User Schedule>  methadone    Tablet 15 milliGRAM(s) Oral at bedtime  methylphenidate 20 milliGRAM(s) Oral <User Schedule>  octreotide  Injectable 100 MICROGram(s) SubCutaneous two times a day  OLANZapine 2.5 milliGRAM(s) Oral at bedtime  pantoprazole    Tablet 40 milliGRAM(s) Oral two times a day  pregabalin 200 milliGRAM(s) Oral three times a day  sodium chloride 0.9%. 1000 milliLiter(s) (75 mL/Hr) IV Continuous <Continuous>    MEDICATIONS  (PRN):  acetaminophen     Tablet .. 650 milliGRAM(s) Oral every 6 hours PRN Temp greater or equal to 38C (100.4F), Mild Pain (1 - 3)  diazepam    Tablet 2 milliGRAM(s) Oral two times a day PRN for anxiety and/or muscle spasm  HYDROmorphone   Tablet 2 milliGRAM(s) Oral every 4 hours PRN Moderate Pain (4 - 6)  HYDROmorphone  Injectable 2 milliGRAM(s) IV Push every 2 hours PRN Severe Pain (7 - 10)  ondansetron Injectable 4 milliGRAM(s) IV Push every 6 hours PRN Nausea and/or Vomiting      PHYSICAL EXAM:    Vital Signs Last 24 Hrs  T(C): 36.4 (01 Aug 2024 14:15), Max: 36.6 (31 Jul 2024 17:30)  T(F): 97.5 (01 Aug 2024 14:15), Max: 97.9 (31 Jul 2024 17:30)  HR: 71 (01 Aug 2024 14:15) (66 - 76)  BP: 94/64 (01 Aug 2024 14:15) (90/60 - 115/78)  BP(mean): --  RR: 18 (01 Aug 2024 14:15) (17 - 18)  SpO2: 93% (01 Aug 2024 14:15) (93% - 100%)    Parameters below as of 01 Aug 2024 14:15  Patient On (Oxygen Delivery Method): room air    General: resting comfortably. NAD.   HEENT: mmm    Lungs: comfortable nonlabored    CV:  rrr  GI: +BS abdomen soft, distended, nontender  MSK:   no cyanosis. +BLE edema,  +weakness      Neuro: nonfocal. alert  oriented x 4, interactive  Skin: warm and dry.     LABS:                        9.7    2.35  )-----------( 133      ( 01 Aug 2024 05:39 )             31.7     08-01    139  |  108  |  6.2<L>  ----------------------------<  70  4.8   |  22.0  |  0.30<L>    Ca    8.2<L>      01 Aug 2024 05:39  Mg     1.9     07-31    TPro  5.2<L>  /  Alb  2.8<L>  /  TBili  1.1  /  DBili  x   /  AST  62<H>  /  ALT  31  /  AlkPhos  226<H>  08-01    PT/INR - ( 31 Jul 2024 05:05 )   PT: 12.3 sec;   INR: 1.11 ratio           Urinalysis Basic - ( 01 Aug 2024 05:39 )    Color: x / Appearance: x / SG: x / pH: x  Gluc: 70 mg/dL / Ketone: x  / Bili: x / Urobili: x   Blood: x / Protein: x / Nitrite: x   Leuk Esterase: x / RBC: x / WBC x   Sq Epi: x / Non Sq Epi: x / Bacteria: x      I&O's Summary    31 Jul 2024 07:01  -  01 Aug 2024 07:00  --------------------------------------------------------  IN: 350 mL / OUT: 0 mL / NET: 350 mL    RADIOLOGY & ADDITIONAL STUDIES: reviewed     CT A/P    ACC: [id number] EXAM:  CT ABDOMEN AND PELVIS IC   ORDERED BY: TENA MUNSON     PROCEDURE DATE:  07/30/2024          INTERPRETATION:  CLINICAL INFORMATION: Left lower quadrant pain.   Metastatic breast cancer.    COMPARISON: CT pulmonary angiogram July 21, 2024, contrast-enhanced CT of   the abdomen and pelvis June 14, 2024.    CONTRAST/COMPLICATIONS:  IV Contrast: Omnipaque 350  90 cc administered   10 cc discarded  Oral Contrast: NONE  Complications: None reported at time of study completion    PROCEDURE:  CT of the Abdomen and Pelvis was performed.  Sagittal and coronal reformats were performed.    FINDINGS:  LOWER CHEST: Central venous catheter tip in the right atrium. Again is   noted coarse parenchymal opacity in the left upper lobe and mild   peribronchovascular opacities in the left lower lobe. There is a mild   layering left pleural effusion.    LIVER: Within normal limits. Hepatic veins and portal vein are patent.  BILE DUCTS: Normal caliber.  GALLBLADDER: Distended.  SPLEEN: The spleen is increased in size to 14.5 cm in maximal axial   dimension. The splenic artery and vein are patent.  PANCREAS: Within normal limits.  ADRENALS: Within normal limits.  KIDNEYS/URETERS: Within normal limits.    BLADDER: Within normal limits.  REPRODUCTIVE ORGANS: Uterus and adnexa within normal limits.    BOWEL: No bowel obstruction. Appendix is normal.  PERITONEUM/RETROPERITONEUM: Mild ascites.  VESSELS: Within normal limits.  LYMPH NODES: No lymphadenopathy.  ABDOMINAL WALL: Within normal limits.  BONES: Again is noted extensive mixed lytic and blastic osseous   metastatic disease, status post vertebroplasty at T12 and L1.    IMPRESSION: Mild ascites. Splenomegaly.    --- End of Report --  JASPAL TRACY MD; Attending Radiologist  This document has been electronically signed. Jul 30 2024  2:33PM  NEUROLOGICAL MEDICATIONS/OPIOIDS/BENZODIAZEPINE OVER PAST 24 HOURS    FLUoxetine   80 milliGRAM(s) Oral (07-31-24 @ 21:49)    HYDROmorphone  Injectable   2 milliGRAM(s) IV Push (08-01-24 @ 05:17)    HYDROmorphone  Injectable   0.5 milliGRAM(s) IV Push (08-01-24 @ 13:51)    HYDROmorphone  Injectable   2 milliGRAM(s) IV Push (07-31-24 @ 21:47)   2 milliGRAM(s) IV Push (07-31-24 @ 17:48)    HYDROmorphone  Injectable   2 milliGRAM(s) IV Push (08-01-24 @ 11:14)    memantine   10 milliGRAM(s) Oral (08-01-24 @ 05:18)   10 milliGRAM(s) Oral (07-31-24 @ 17:48)    methadone    Tablet   10 milliGRAM(s) Oral (08-01-24 @ 13:51)   10 milliGRAM(s) Oral (08-01-24 @ 08:14)    methadone    Tablet   15 milliGRAM(s) Oral (07-31-24 @ 21:43)    methylphenidate   20 milliGRAM(s) Oral (08-01-24 @ 11:14)   20 milliGRAM(s) Oral (08-01-24 @ 05:34)    OLANZapine   2.5 milliGRAM(s) Oral (07-31-24 @ 21:48)    ondansetron Injectable   4 milliGRAM(s) IV Push (08-01-24 @ 13:51)    pregabalin   200 milliGRAM(s) Oral (08-01-24 @ 13:51)   200 milliGRAM(s) Oral (08-01-24 @ 05:26)   200 milliGRAM(s) Oral (07-31-24 @ 21:47)    ISTOP:  Reference #: [id number]    Prescription Information      PDI Filter:    PDI	My Rx	Current Rx	Drug Type	Rx Written	Rx Dispensed	Drug	Quantity	Days Supply	Prescriber Name	Prescriber JESSICA #	Payment Method	Dispenser  A	N	N		06/04/2024	06/06/2024	curachew (20:1) 5mg thc and 0.25mg cbd/chew raspberry	7	28	Hardjose david, [place]	[id number]	[place]	[employer]  A	N	N		06/04/2024	06/06/2024	curaleaf indica 75% (20:1) 2.5mg thc / 0.125mg cbd/dose vape	1	2	Hardjose david [place]	[id number]	[place]	[employer]  A	N	N		06/04/2024	06/06/2024	curaleaf sativa 75% (20:1) 2.5mg thc/0.125mg cbd/dose vape	1	2	Hardjosed avid [place]	[id number]	[place]	[employer]  A	N	N		01/18/2023	10/14/2023	select ratio bites, 1:1 blackberry 5.0mg/thc, 5.0mg/cbn/bite	3	12	Hardjose david, [place]	[id number]	[place]	[employer]  A	N	N		01/18/2023	10/14/2023	curachew (20:1) 5mg thc and 0.25mg cbd/chew raspberry	5	20	Hardjose david, [place]	[id number]	[facility] [employer]  B	N	N	O	07/25/2024	07/25/2024	hydromorphone 2 mg tablet	30	5	Raymond Melo)	[id number]	Medicare	[facility]	N	Y		07/10/2024	07/11/2024	pregabalin 200 mg capsule	90	30	Hardjose david, [place]	[id number]	Insurance	[facility]	N	Y	B	07/10/2024	07/11/2024	diazepam 2 mg tablet	60	30	Hard[place] main	[id number]	Insurance	[facility]  B	N	N	O	05/29/2024	06/12/2024	hydromorphone 4 mg tablet	90	15	Hardoon, [place]	[id number]	Insurance	[facility]  B	N	N	B	05/29/2024	06/07/2024	diazepam 2 mg tablet	60	20	Hardoon, [place]	[id number]	Insurance	[facility]  B	N	N	O	05/29/2024	06/07/2024	methadone hcl 10 mg tablet	120	30	Hardoon, [place]	[id number]	Insurance	[facility]  B	N	N		05/07/2024	05/16/2024	pregabalin 200 mg capsule	90	30	Hardoon, [place]	[id number]	Insurance	[facility]  B	N	N	B	04/14/2024	04/16/2024	diazepam 2 mg tablet	60	20	Hardoon, [place]	[id number]	Insurance	[facility]  B	N	N	S	04/14/2024	04/16/2024	methylphenidate 20 mg tablet	60	30	Hardoon, [place]	[id number]	Insurance	[facility]  B	N	N		04/15/2024	04/16/2024	zolpidem tartrate 5 mg tablet	10	10	Hardoon, [place]	[id number]	Insurance	[facility]  B	N	N	O	04/15/2024	04/16/2024	methadone hcl 10 mg tablet	135	30	Hardoon, [place]	[id number]	Insurance	[facility]  B	N	N	O	02/29/2024	03/18/2024	hydromorphone 4 mg tablet	90	30	Hardoon, [place]	[id number]	Insurance	[facility]  B	N	N	B	03/11/2024	03/12/2024	diazepam 2 mg tablet	60	20	Hardoon, [place]	[id number]	Insurance	[facility]  B	N	N		02/29/2024	03/01/2024	pregabalin 200 mg capsule	90	30	Hardoon, [place]	[id number]	Insurance	[facility]  B	N	N	O	02/29/2024	03/01/2024	hydromorphone 2 mg tablet	90	30	Hardoon, [place]	[id number]	Insurance	[facility]  B	N	N		02/16/2024	02/16/2024	carisoprodol 250 mg tablet	20	5	Hardoon, [place]	[id number]	Insurance	[facility]  B	N	N	B	02/16/2024	02/16/2024	lorazepam 0.5 mg tablet	60	20	Hardoon, [place]	[id number]	Insurance	[facility]  B	N	N	O	02/09/2024	02/12/2024	hydromorphone 4 mg tablet	60	20	Hardoon, [place]	[id number]	Insurance	[facility]  B	N	N	O	02/09/2024	02/12/2024	methadone hcl 10 mg tablet	120	30	Hardoon, [place]	[id number]	Insurance	[facility]  B	N	N	S	02/09/2024	02/12/2024	methylphenidate 20 mg tablet	45	30	Hardoon, [place]	[id number]	Insurance	[facility]  B	N	N	O	01/22/2024	01/24/2024	hydromorphone 2 mg tablet	40	5	CrispEli [id number]	Insurance	[facility]  B	N	N	O	11/24/2023	12/07/2023	methadone hcl 10 mg tablet	120	30	Hardoon, [place]	[id number]	Insurance	[facility]  B	N	N	O	11/24/2023	12/07/2023	hydromorphone 4 mg tablet	75	25	Hardoon, [place]	[id number]	Insurance	[facility]  B	N	N	S	11/24/2023	11/27/2023	methylphenidate 20 mg tablet	45	30	Hardoon, [place]	[id number]	Insurance	[facility]  B	N	N		11/24/2023	11/27/2023	pregabalin 150 mg capsule	90	30	Hardoon, [place]	[id number]	Insurance	[facility]  B	N	N	B	09/27/2023	10/03/2023	lorazepam 0.5 mg tablet	60	30	Hardoon, [place]	[id number]	Medicaid	[facility]  B	N	N	S	09/19/2023	09/22/2023	methylphenidate 20 mg tablet	45	30	Hardoon, [place]	[id number]	Insurance	[facility]  B	N	N	O	09/19/2023	09/22/2023	methadone hcl 10 mg tablet	105	30	Hardoon, [place]	[id number]	Insurance	[facility]  B	N	N		09/05/2023	09/06/2023	pregabalin 150 mg capsule	90	30	Hardoon, [place]	[id number]	Insurance	[facility]  B	N	N	B	08/11/2023	08/21/2023	lorazepam 0.5 mg tablet	60	30	Hardjose david, [place]	[id number]	Insurance	[facility]  B	N	N	O	08/09/2023	08/11/2023	hydromorphone 4 mg tablet	60	20	Hardjose david, [place]	[id number]	Insurance	[facility]  B	N	N	S	08/09/2023	08/11/2023	methylphenidate 20 mg tablet	45	30	Hard[place] main	[id number]	Insurance	[facility]

## 2024-08-02 LAB
ALBUMIN SERPL ELPH-MCNC: 2.5 G/DL — LOW (ref 3.3–5.2)
ALP SERPL-CCNC: 199 U/L — HIGH (ref 40–120)
ALT FLD-CCNC: 24 U/L — SIGNIFICANT CHANGE UP
ANION GAP SERPL CALC-SCNC: 8 MMOL/L — SIGNIFICANT CHANGE UP (ref 5–17)
AST SERPL-CCNC: 43 U/L — HIGH
BILIRUB SERPL-MCNC: 0.5 MG/DL — SIGNIFICANT CHANGE UP (ref 0.4–2)
BUN SERPL-MCNC: 6.9 MG/DL — LOW (ref 8–20)
CALCIUM SERPL-MCNC: 8.2 MG/DL — LOW (ref 8.4–10.5)
CHLORIDE SERPL-SCNC: 109 MMOL/L — HIGH (ref 96–108)
CO2 SERPL-SCNC: 26 MMOL/L — SIGNIFICANT CHANGE UP (ref 22–29)
CREAT SERPL-MCNC: 0.47 MG/DL — LOW (ref 0.5–1.3)
EGFR: 126 ML/MIN/1.73M2 — SIGNIFICANT CHANGE UP
GLUCOSE SERPL-MCNC: 93 MG/DL — SIGNIFICANT CHANGE UP (ref 70–99)
HCT VFR BLD CALC: 28.1 % — LOW (ref 34.5–45)
HGB BLD-MCNC: 8.5 G/DL — LOW (ref 11.5–15.5)
MCHC RBC-ENTMCNC: 29.6 PG — SIGNIFICANT CHANGE UP (ref 27–34)
MCHC RBC-ENTMCNC: 30.2 GM/DL — LOW (ref 32–36)
MCV RBC AUTO: 97.9 FL — SIGNIFICANT CHANGE UP (ref 80–100)
PLATELET # BLD AUTO: 124 K/UL — LOW (ref 150–400)
POTASSIUM SERPL-MCNC: 4 MMOL/L — SIGNIFICANT CHANGE UP (ref 3.5–5.3)
POTASSIUM SERPL-SCNC: 4 MMOL/L — SIGNIFICANT CHANGE UP (ref 3.5–5.3)
PROT SERPL-MCNC: 4.6 G/DL — LOW (ref 6.6–8.7)
RBC # BLD: 2.87 M/UL — LOW (ref 3.8–5.2)
RBC # FLD: 18.9 % — HIGH (ref 10.3–14.5)
SODIUM SERPL-SCNC: 143 MMOL/L — SIGNIFICANT CHANGE UP (ref 135–145)
WBC # BLD: 2.14 K/UL — LOW (ref 3.8–10.5)
WBC # FLD AUTO: 2.14 K/UL — LOW (ref 3.8–10.5)

## 2024-08-02 PROCEDURE — 99232 SBSQ HOSP IP/OBS MODERATE 35: CPT

## 2024-08-02 PROCEDURE — 71045 X-RAY EXAM CHEST 1 VIEW: CPT | Mod: 26

## 2024-08-02 RX ADMIN — Medication 30 MILLILITER(S): at 07:24

## 2024-08-02 RX ADMIN — Medication 200 MILLIGRAM(S): at 13:44

## 2024-08-02 RX ADMIN — METHYLPHENIDATE HYDROCHLORIDE 20 MILLIGRAM(S): 18 TABLET, EXTENDED RELEASE ORAL at 06:12

## 2024-08-02 RX ADMIN — OCTREOTIDE ACETATE 100 MICROGRAM(S): 200 INJECTION INTRAVENOUS at 16:53

## 2024-08-02 RX ADMIN — Medication 10 MILLIGRAM(S): at 10:12

## 2024-08-02 RX ADMIN — Medication 200 MILLIGRAM(S): at 06:06

## 2024-08-02 RX ADMIN — MEMANTINE HYDROCHLORIDE 10 MILLIGRAM(S): 14 CAPSULE, EXTENDED RELEASE ORAL at 16:53

## 2024-08-02 RX ADMIN — Medication 2.5 MILLIGRAM(S): at 21:08

## 2024-08-02 RX ADMIN — PANTOPRAZOLE SODIUM 40 MILLIGRAM(S): 20 TABLET, DELAYED RELEASE ORAL at 06:05

## 2024-08-02 RX ADMIN — Medication 80 MILLIGRAM(S): at 21:09

## 2024-08-02 RX ADMIN — OCTREOTIDE ACETATE 100 MICROGRAM(S): 200 INJECTION INTRAVENOUS at 06:06

## 2024-08-02 RX ADMIN — Medication 200 MILLIGRAM(S): at 21:07

## 2024-08-02 RX ADMIN — MEMANTINE HYDROCHLORIDE 10 MILLIGRAM(S): 14 CAPSULE, EXTENDED RELEASE ORAL at 06:05

## 2024-08-02 RX ADMIN — METHYLPHENIDATE HYDROCHLORIDE 20 MILLIGRAM(S): 18 TABLET, EXTENDED RELEASE ORAL at 11:32

## 2024-08-02 RX ADMIN — PANTOPRAZOLE SODIUM 40 MILLIGRAM(S): 20 TABLET, DELAYED RELEASE ORAL at 16:54

## 2024-08-02 RX ADMIN — Medication 30 MILLILITER(S): at 19:24

## 2024-08-02 RX ADMIN — Medication 10 MILLIGRAM(S): at 16:54

## 2024-08-02 RX ADMIN — Medication 15 MILLIGRAM(S): at 21:08

## 2024-08-02 RX ADMIN — CHLORHEXIDINE GLUCONATE 1 APPLICATION(S): 500 CLOTH TOPICAL at 06:06

## 2024-08-02 NOTE — PROGRESS NOTE ADULT - ATTENDING COMMENTS
Patient seen and examined.  Patient with continued to have blood drop anemia in the setting of metastatic breast cancer status post EGD.  Blood count seems to be stable.  On MRI there is evidence of no liver lesion and small amount of ascites.  Clinically there is no large ascites at all.  Continue with diet.  GI will follow-up as needed.
Ms. Dave is a 38 year old woman who presented with acute on chronic anemia (obscure, occult) for which GI consulted, patient well known to service, images interpreted and updated labs reviewed in detail, Previous history of oozing gastrointestinal hemorrhage (GAVE), EGD this admission with evidence of erosions / red spots in the antrum / duodenum s/p bipolar electrocautery ablation, reports reviewed. No further stigmata suggestive of ongoing bleeding. Hemoglobin remains stable. Noted to have evidence of portal hypertension, awaiting MRI. Discussed plan with patient and covering provider. We will continue to monitor closely.

## 2024-08-02 NOTE — CHART NOTE - NSCHARTNOTEFT_GEN_A_CORE
Case discussed with RN and hospitalist Jaime.  [facility] established. Pt wishes to continue pursuit of all medical interventions including ongoing disease directed cancer therapies, will follow up oncology team on discharge.   Recommend advance illness referral and [employer] Referral on discharge for additional supportive services.     No further recommendations from a palliative standpoint. Will sign off. Please reconsult PRN if goals of care should change and assistance needed in further collaborative family discussions. Dispo planning per CCC. Ongoing medical mgnt per primary team.
PA NOTE-MEDICINE    Called by RN due to Pt stating that PO Dilaudid Dose is ineffective.  Pt states that she takes Dilaudid Daily at home due to stage 4 Breast CA with Mets to Bone/Brain.   Changed Dilaudid to 2 Mg IVP Q 2 Hrs Prn  only for 1 Day  for Severe Pain with holding Parameters

## 2024-08-02 NOTE — PROGRESS NOTE ADULT - ASSESSMENT
Patient is a 38y woman presenting to the ER for recurrent anemia. Patient is familiar to the GI department and has gotten an endoscopic study early in 7/2024 which showed resolution of GAVE but red spots in the antrum and the duodenum. Repeat EGD from 7/31/2024 showed normal esophagus and duodenum, and 2 erosion that were oozing in the antrum with small erythematous spots. MRI abdomen performed to look for liver metastases and ascites.    #Anemia  - S/p 3 PRBCs  - S/p EGO and cauterization of oozing erosions in the antrum  - Trend H/H  - Continue protonix 40mg BID    #Ascites  - CT A/P with mild ascites  - MRI abdomen indicate small amount of ascites. Patient is a 38y woman presenting to the ER for recurrent anemia. Patient is familiar to the GI department and has gotten an endoscopic study early in 7/2024 which showed resolution of GAVE but red spots in the antrum and the duodenum. Repeat EGD from 7/31/2024 showed normal esophagus and duodenum, and 2 erosion that were oozing in the antrum with small erythematous spots. MRI abdomen performed to look for liver metastases and ascites.    #Anemia  - S/p 3 PRBCs  - S/p EGD and cauterization of oozing erosions in the antrum  - Trend H/H  - Continue protonix 40mg BID    #Ascites  - CT A/P with mild ascites  - MRI abdomen indicate small amount of ascites.

## 2024-08-02 NOTE — PROGRESS NOTE ADULT - SUBJECTIVE AND OBJECTIVE BOX
Chief Complaint:  Patient is a 38y old  Female who presents with a chief complaint of Low blood count     HPI/ 24 hr events: Patient seen and examined at bedside. Pt feeling well. Tolerating diet. Patient continue to have hematochezia. Patient concern about ascites. Denies nausea, vomiting, diarrhea, abdominal pain. Vitals are overall stable, no leukocytosis, LFTs stable. Hemoglobin stable (5.1 -> 10 -> 9.4 -> 9.7 -> 9.2 -> 8.5).         REVIEW OF SYSTEMS:   General: Negative  HEENT: Negative  CV: Negative  Respiratory: Negative  GI: See HPI  : Negative  MSK: Negative  Hematologic: Negative  Skin: Negative    MEDICATIONS:   MEDICATIONS  (STANDING):  chlorhexidine 2% Cloths 1 Application(s) Topical <User Schedule>  FLUoxetine 80 milliGRAM(s) Oral at bedtime  heparin  Lock Flush 100 Units/mL Injectable 300 Unit(s) IV Push once  HYDROmorphone PCA (1 mG/mL) 30 milliLiter(s) PCA Continuous PCA Continuous  lactulose Syrup 20 Gram(s) Oral three times a day  memantine 10 milliGRAM(s) Oral two times a day  methadone    Tablet 10 milliGRAM(s) Oral <User Schedule>  methadone    Tablet 15 milliGRAM(s) Oral at bedtime  methylphenidate 20 milliGRAM(s) Oral <User Schedule>  octreotide  Injectable 100 MICROGram(s) SubCutaneous two times a day  OLANZapine 2.5 milliGRAM(s) Oral at bedtime  pantoprazole    Tablet 40 milliGRAM(s) Oral two times a day  pregabalin 200 milliGRAM(s) Oral three times a day  sodium chloride 0.9%. 1000 milliLiter(s) (75 mL/Hr) IV Continuous <Continuous>    MEDICATIONS  (PRN):  acetaminophen     Tablet .. 650 milliGRAM(s) Oral every 6 hours PRN Temp greater or equal to 38C (100.4F), Mild Pain (1 - 3)  diazepam    Tablet 2 milliGRAM(s) Oral two times a day PRN for anxiety and/or muscle spasm  HYDROmorphone   Tablet 2 milliGRAM(s) Oral every 4 hours PRN Moderate Pain (4 - 6)  HYDROmorphone  Injectable 2 milliGRAM(s) IV Push every 2 hours PRN Severe Pain (7 - 10)  ondansetron Injectable 4 milliGRAM(s) IV Push every 6 hours PRN Nausea and/or Vomiting      Packed Red Cells Order:  1 Unit  Indication: Hgb <7 gm/dL  Infuse Unit : 3.5 Hours (07-29-24 @ 20:24)  Packed Red Cells Order:  1 Unit  Indication: Hgb <7 gm/dL  Infuse Unit : 3 Hours (07-29-24 @ 15:11)  Packed Red Cells Order:  1 Unit  Indication: Hgb <7 gm/dL  Infuse Unit : 3 Hours (07-29-24 @ 15:11)  Packed Red Cells Order:  1 Unit  Indication: Hgb <7 gm/dL  Infuse Unit : 3.5 Hours (07-29-24 @ 15:05)  Packed Red Cells Order:  1 Unit  Indication: Hgb <7 gm/dL  Infuse Unit : 3.5 Hours (07-29-24 @ 15:05)      DIET:  Diet, Regular (08-02-24 @ 12:33) [Active]    ALLERGIES:   Allergies    pertuzumab (Other (Severe))  Perjeta (Other (Severe))    Intolerances    VITAL SIGNS:   Vital Signs Last 24 Hrs  T(C): 36.4 (02 Aug 2024 16:51), Max: 36.7 (01 Aug 2024 20:14)  T(F): 97.6 (02 Aug 2024 16:51), Max: 98 (01 Aug 2024 20:14)  HR: 84 (02 Aug 2024 16:51) (63 - 84)  BP: 110/72 (02 Aug 2024 16:51) (95/60 - 115/76)  BP(mean): --  RR: 18 (02 Aug 2024 16:51) (17 - 18)  SpO2: 96% (02 Aug 2024 16:51) (92% - 100%)    Parameters below as of 02 Aug 2024 16:51  Patient On (Oxygen Delivery Method): room air      I&O's Summary    01 Aug 2024 07:01  -  02 Aug 2024 07:00  --------------------------------------------------------  IN: 350 mL / OUT: 300 mL / NET: 50 mL        PHYSICAL EXAM:   GENERAL:  No acute distress  HEENT:  NC/AT, conjunctiva clear, sclera anicteric  CHEST:  No increased effort  HEART:  Regular rate  ABDOMEN:  Soft, non-tender, non-distended, no rebound or guarding  EXTREMITIES: No edema  SKIN:  Warm, dry  NEURO:  Calm, cooperative    LABS:                        8.5    2.14  )-----------( 124      ( 02 Aug 2024 06:26 )             28.1     Hemoglobin: 8.5 g/dL (08-02-24 @ 06:26)  Hemoglobin: 9.2 g/dL (08-01-24 @ 21:45)  Hemoglobin: 9.7 g/dL (08-01-24 @ 05:39)  Hemoglobin: 9.4 g/dL (07-31-24 @ 05:05)    08-02    143  |  109<H>  |  6.9<L>  ----------------------------<  93  4.0   |  26.0  |  0.47<L>    Ca    8.2<L>      02 Aug 2024 06:26    TPro  4.6<L>  /  Alb  2.5<L>  /  TBili  0.5  /  DBili  x   /  AST  43<H>  /  ALT  24  /  AlkPhos  199<H>  08-02    LIVER FUNCTIONS - ( 02 Aug 2024 06:26 )  Alb: 2.5 g/dL / Pro: 4.6 g/dL / ALK PHOS: 199 U/L / ALT: 24 U/L / AST: 43 U/L / GGT: x             RADIOLOGY & ADDITIONAL STUDIES:    EGD Report  Date: 7/31/2024 2:50 PM  Mable Connelly MD, DO    Impressions:  Normal esophagus.  Normal duodenum.  Friability. There were 2 erosions which were oozing. There were small erythematous spots in the antrum as well. In the antrum.      MR Abdomen w/wo IV Cont (08.01.24 @ 18:37)  IMPRESSION:    No evidence of liver metastases.  Small amount of ascites.    --- End of Report ---    CHAD JO MD; Attending Radiologist  This document has been electronically signed. Aug  2 2024  2:51PM   Chief Complaint:  Patient is a 38y old  Female who presents with a chief complaint of Low blood count     HPI/ 24 hr events: Patient seen and examined at bedside. Pt feeling well. Tolerating diet. Patient continue to have hematochezia. Patient concern about ascites. Denies nausea, vomiting, diarrhea, abdominal pain. Vitals are overall stable, no leukocytosis, LFTs stable. Hemoglobin stable (5.1 -> 10 -> 9.4 -> 9.7 -> 9.2 -> 8.5).         REVIEW OF SYSTEMS:   General: Negative  HEENT: Negative  CV: Negative  Respiratory: Negative  GI: See HPI  : Negative  MSK: Negative  Hematologic: Negative  Skin: Negative    MEDICATIONS:   MEDICATIONS  (STANDING):  chlorhexidine 2% Cloths 1 Application(s) Topical <User Schedule>  FLUoxetine 80 milliGRAM(s) Oral at bedtime  heparin  Lock Flush 100 Units/mL Injectable 300 Unit(s) IV Push once  HYDROmorphone PCA (1 mG/mL) 30 milliLiter(s) PCA Continuous PCA Continuous  lactulose Syrup 20 Gram(s) Oral three times a day  memantine 10 milliGRAM(s) Oral two times a day  methadone    Tablet 10 milliGRAM(s) Oral <User Schedule>  methadone    Tablet 15 milliGRAM(s) Oral at bedtime  methylphenidate 20 milliGRAM(s) Oral <User Schedule>  octreotide  Injectable 100 MICROGram(s) SubCutaneous two times a day  OLANZapine 2.5 milliGRAM(s) Oral at bedtime  pantoprazole    Tablet 40 milliGRAM(s) Oral two times a day  pregabalin 200 milliGRAM(s) Oral three times a day  sodium chloride 0.9%. 1000 milliLiter(s) (75 mL/Hr) IV Continuous <Continuous>    MEDICATIONS  (PRN):  acetaminophen     Tablet .. 650 milliGRAM(s) Oral every 6 hours PRN Temp greater or equal to 38C (100.4F), Mild Pain (1 - 3)  diazepam    Tablet 2 milliGRAM(s) Oral two times a day PRN for anxiety and/or muscle spasm  HYDROmorphone   Tablet 2 milliGRAM(s) Oral every 4 hours PRN Moderate Pain (4 - 6)  HYDROmorphone  Injectable 2 milliGRAM(s) IV Push every 2 hours PRN Severe Pain (7 - 10)  ondansetron Injectable 4 milliGRAM(s) IV Push every 6 hours PRN Nausea and/or Vomiting      Packed Red Cells Order:  1 Unit  Indication: Hgb <7 gm/dL  Infuse Unit : 3.5 Hours (07-29-24 @ 20:24)  Packed Red Cells Order:  1 Unit  Indication: Hgb <7 gm/dL  Infuse Unit : 3 Hours (07-29-24 @ 15:11)  Packed Red Cells Order:  1 Unit  Indication: Hgb <7 gm/dL  Infuse Unit : 3 Hours (07-29-24 @ 15:11)  Packed Red Cells Order:  1 Unit  Indication: Hgb <7 gm/dL  Infuse Unit : 3.5 Hours (07-29-24 @ 15:05)  Packed Red Cells Order:  1 Unit  Indication: Hgb <7 gm/dL  Infuse Unit : 3.5 Hours (07-29-24 @ 15:05)      DIET:  Diet, Regular (08-02-24 @ 12:33) [Active]    ALLERGIES:   Allergies    pertuzumab (Other (Severe))  Perjeta (Other (Severe))    Intolerances    VITAL SIGNS:   Vital Signs Last 24 Hrs  T(C): 36.4 (02 Aug 2024 16:51), Max: 36.7 (01 Aug 2024 20:14)  T(F): 97.6 (02 Aug 2024 16:51), Max: 98 (01 Aug 2024 20:14)  HR: 84 (02 Aug 2024 16:51) (63 - 84)  BP: 110/72 (02 Aug 2024 16:51) (95/60 - 115/76)  BP(mean): --  RR: 18 (02 Aug 2024 16:51) (17 - 18)  SpO2: 96% (02 Aug 2024 16:51) (92% - 100%)    Parameters below as of 02 Aug 2024 16:51  Patient On (Oxygen Delivery Method): room air      I&O's Summary    01 Aug 2024 07:01  -  02 Aug 2024 07:00  --------------------------------------------------------  IN: 350 mL / OUT: 300 mL / NET: 50 mL        PHYSICAL EXAM:   GENERAL:  No acute distress  HEENT:  NC/AT, conjunctiva clear, sclera anicteric  CHEST:  No increased effort  HEART:  Regular rate  ABDOMEN:  Soft, non-tender, mild distended due to ascites, no rebound or guarding,   EXTREMITIES: No edema  SKIN:  Warm, dry  NEURO:  Calm, cooperative    LABS:                        8.5    2.14  )-----------( 124      ( 02 Aug 2024 06:26 )             28.1     Hemoglobin: 8.5 g/dL (08-02-24 @ 06:26)  Hemoglobin: 9.2 g/dL (08-01-24 @ 21:45)  Hemoglobin: 9.7 g/dL (08-01-24 @ 05:39)  Hemoglobin: 9.4 g/dL (07-31-24 @ 05:05)    08-02    143  |  109<H>  |  6.9<L>  ----------------------------<  93  4.0   |  26.0  |  0.47<L>    Ca    8.2<L>      02 Aug 2024 06:26    TPro  4.6<L>  /  Alb  2.5<L>  /  TBili  0.5  /  DBili  x   /  AST  43<H>  /  ALT  24  /  AlkPhos  199<H>  08-02    LIVER FUNCTIONS - ( 02 Aug 2024 06:26 )  Alb: 2.5 g/dL / Pro: 4.6 g/dL / ALK PHOS: 199 U/L / ALT: 24 U/L / AST: 43 U/L / GGT: x             RADIOLOGY & ADDITIONAL STUDIES:    EGD Report  Date: 7/31/2024 2:50 PM  Mable Connelly MD, DO    Impressions:  Normal esophagus.  Normal duodenum.  Friability. There were 2 erosions which were oozing. There were small erythematous spots in the antrum as well. In the antrum.      MR Abdomen w/wo IV Cont (08.01.24 @ 18:37)  IMPRESSION:    No evidence of liver metastases.  Small amount of ascites.    --- End of Report ---    CHAD JO MD; Attending Radiologist  This document has been electronically signed. Aug  2 2024  2:51PM

## 2024-08-02 NOTE — PROGRESS NOTE ADULT - ASSESSMENT
37 y/o female with hx of Stage-4 Breast cancer with diffuse osseous mets, on home methadone for chronic pain   dPCA 0/0.3/8/6  cont home methadone 10/10//15        when due for discharge:  DC PCA  - Recommend starting dilaudid PO 4mg/8mg [id number] PRN mod/severe pain

## 2024-08-02 NOTE — PROGRESS NOTE ADULT - SUBJECTIVE AND OBJECTIVE BOX
Patient is a 38y old  Female who presents with a chief complaint of Low blood count (02 Aug 2024 11:56)    INTERVAL HPI/OVERNIGHT EVENTS: No acute events overnight. HD stable.     MEDICATIONS  (STANDING):  chlorhexidine 2% Cloths 1 Application(s) Topical <User Schedule>  FLUoxetine 80 milliGRAM(s) Oral at bedtime  heparin  Lock Flush 100 Units/mL Injectable 300 Unit(s) IV Push once  HYDROmorphone PCA (1 mG/mL) 30 milliLiter(s) PCA Continuous PCA Continuous  lactulose Syrup 20 Gram(s) Oral three times a day  memantine 10 milliGRAM(s) Oral two times a day  methadone    Tablet 10 milliGRAM(s) Oral <User Schedule>  methadone    Tablet 15 milliGRAM(s) Oral at bedtime  methylphenidate 20 milliGRAM(s) Oral <User Schedule>  octreotide  Injectable 100 MICROGram(s) SubCutaneous two times a day  OLANZapine 2.5 milliGRAM(s) Oral at bedtime  pantoprazole    Tablet 40 milliGRAM(s) Oral two times a day  pregabalin 200 milliGRAM(s) Oral three times a day  sodium chloride 0.9%. 1000 milliLiter(s) (75 mL/Hr) IV Continuous <Continuous>    MEDICATIONS  (PRN):  acetaminophen     Tablet .. 650 milliGRAM(s) Oral every 6 hours PRN Temp greater or equal to 38C (100.4F), Mild Pain (1 - 3)  diazepam    Tablet 2 milliGRAM(s) Oral two times a day PRN for anxiety and/or muscle spasm  HYDROmorphone   Tablet 2 milliGRAM(s) Oral every 4 hours PRN Moderate Pain (4 - 6)  HYDROmorphone  Injectable 2 milliGRAM(s) IV Push every 2 hours PRN Severe Pain (7 - 10)  ondansetron Injectable 4 milliGRAM(s) IV Push every 6 hours PRN Nausea and/or Vomiting      Allergies    pertuzumab (Other (Severe))  Perjeta (Other (Severe))    Intolerances        REVIEW OF SYSTEMS: all negative with exception of above    Vital Signs Last 24 Hrs  T(C): 36.4 (02 Aug 2024 05:50), Max: 36.7 (01 Aug 2024 20:14)  T(F): 97.6 (02 Aug 2024 05:50), Max: 98 (01 Aug 2024 20:14)  HR: 63 (02 Aug 2024 08:41) (63 - 82)  BP: 99/64 (02 Aug 2024 08:41) (94/64 - 115/76)  BP(mean): --  RR: 18 (02 Aug 2024 09:41) (17 - 18)  SpO2: 92% (02 Aug 2024 09:41) (92% - 100%)    Parameters below as of 02 Aug 2024 09:41  Patient On (Oxygen Delivery Method): room air        PHYSICAL EXAM:  Gen: NAD, pallor  Card: regular rate and rhythm. Mild ankle edema bilaterally.  Resp:  CTAB  Abd: soft, non-distended   Ext: DENNEY well  Neuro: A&O,   Psych:  Normal affect and mood    LABS:                        8.5    2.14  )-----------( 124      ( 02 Aug 2024 06:26 )             28.1     08-02    143  |  109<H>  |  6.9<L>  ----------------------------<  93  4.0   |  26.0  |  0.47<L>    Ca    8.2<L>      02 Aug 2024 06:26    TPro  4.6<L>  /  Alb  2.5<L>  /  TBili  0.5  /  DBili  x   /  AST  43<H>  /  ALT  24  /  AlkPhos  199<H>  08-02      Urinalysis Basic - ( 02 Aug 2024 06:26 )    Color: x / Appearance: x / SG: x / pH: x  Gluc: 93 mg/dL / Ketone: x  / Bili: x / Urobili: x   Blood: x / Protein: x / Nitrite: x   Leuk Esterase: x / RBC: x / WBC x   Sq Epi: x / Non Sq Epi: x / Bacteria: x      CAPILLARY BLOOD GLUCOSE          RADIOLOGY & ADDITIONAL TESTS:    Imaging Personally Reviewed:  [ ] YES  [ ] NO  < from: CT Abdomen and Pelvis w/ IV Cont (07.30.24 @ 14:05) >  IMPRESSION: Mild ascites. Splenomegaly.    --- End of Report ---            JASPAL TRACY MD; Attending Radiologist  This document has been electronically signed. Jul 30 2024  2:33PM    < end of copied text >        Consultant(s) Notes Reviewed:  [ ] YES  [ ] NO    Care Discussed with Consultants/Other Providers [ ] YES  [ ] NO

## 2024-08-02 NOTE — PROGRESS NOTE ADULT - ASSESSMENT
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 Decrease in size of multiple enhancing infra and supratentorial lesions. On surveillance.  - Last PET on 4/3/24 showed Asymmetric foci of hypermetabolism in the right cerebellum corresponding to lesions evident on concurrent MRI.Very mild non significant hypermetabolism associated with some of multiple mixed lytic/sclerotic lesions throughout skeleton.  No significant hypermetabolic foci to suggest metastatic activity   - CT A/P  showed mild layering left pleural effusion. 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 Normal duodenum. Friability. There were 2 erosions which were oozing. There were small erythematous spots in the antrum as well. In the antrum. cauterization of oozing erosions in the antrum  -CT Abd / Pelvis  Mild ascites. Splenomegaly. LIVER: Within normal limits. Hepatic veins and portal vein are patent.    - Ammonia level 81   - Ammonia concentration increases in red cell units (RBCs) during storage and pt has been getting frequent transfusions?   - Enhurtu can also raise LFT's  - ferritin 349 ALK Phos  213 ( known bone mets ) AST 77 ALT 37 Drawn in the office on 7/29   - awaiting MRI ABD w and W/O contrast to evaluate liver     CBC is stable   WBC 2.14 Hgb 8.5 Plt 124k     Will follow

## 2024-08-02 NOTE — PROGRESS NOTE ADULT - SUBJECTIVE AND OBJECTIVE BOX
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 On Octerotide LD 7/18 and iv iron LD weekly LD 7/18 including prior w/u with GI (noted to have telangiectasias on EGD / GAVE) , Sent form the office for a 10 lb weight gain anf hgb of 5.7 Placed in obs Hgb 5.1 S/P 3 U PRNC hgb now 10    PAST MEDICAL & SURGICAL HISTORY:  H/O compression fracture of spine  Anxiety  Metastatic breast cancer  H/O pleural effusion  Pericardial effusion  S/P tonsillectomy  H/O chest tube placement  12/23/21  S/P pericardiocentesis  12/28/21    Allergies  pertuzumab (Other (Severe))  Perjeta (Other (Severe))    MEDICATIONS  (STANDING):  heparin  Lock Flush 100 Units/mL Injectable 300 Unit(s) IV Push once  polyethylene glycol 3350 17 Gram(s) Oral daily  senna 2 Tablet(s) Oral at bedtime    MEDICATIONS  (PRN):  HYDROmorphone  Injectable 2 milliGRAM(s) IV Push every 2 hours PRN Severe Pain (7 - 10)    Vital Signs Last 24 Hrs  T(C): 36.4 (02 Aug 2024 05:50), Max: 36.7 (01 Aug 2024 20:14)  T(F): 97.6 (02 Aug 2024 05:50), Max: 98 (01 Aug 2024 20:14)  HR: 63 (02 Aug 2024 08:41) (63 - 82)  BP: 99/64 (02 Aug 2024 08:41) (94/64 - 115/76)  BP(mean): --  RR: 18 (02 Aug 2024 09:41) (17 - 18)  SpO2: 92% (02 Aug 2024 09:41) (92% - 100%)    Parameters below as of 02 Aug 2024 09:41  Patient On (Oxygen Delivery Method): room air      PHYSICAL EXAM:   Gen: NAD, pallor  Head: NC/AT  Neck: supple  Card: regular rate and rhythm. Mild ankle edema bilaterally.  Resp:  CTAB  Abd: soft, non-distended   Ext: DENNEY well  Neuro:  A&O,   Skin:  Warm and dry   Psych:  Normal affect and mood       CBC                                      8.5    2.14  )-----------( 124      ( 02 Aug 2024 06:26 )             28.1                9.7    2.35  )-----------( 133      ( 01 Aug 2024 05:39 )             31.7                           9.4    3.29  )-----------( 144      ( 31 Jul 2024 05:05 )             29.2                10.0   3.50  )-----------( 132      ( 30 Jul 2024 07:53 )             30.4   CHEM  08-02    143  |  109<H>  |  6.9<L>  ----------------------------<  93  4.0   |  26.0  |  0.47<L>    Ca    8.2<L>      02 Aug 2024 06:26    TPro  4.6<L>  /  Alb  2.5<L>  /  TBili  0.5  /  DBili  x   /  AST  43<H>  /  ALT  24  /  AlkPhos  199<H>  08-02 08-01    139  |  108  |  6.2<L>  ----------------------------<  70  4.8   |  22.0  |  0.30<L>    Ca    8.2<L>      01 Aug 2024 05:39  Mg     1.9     07-31    TPro  5.2<L>  /  Alb  2.8<L>  /  TBili  1.1  /  DBili  x   /  AST  62<H>  /  ALT  31  /  AlkPhos  226<H>  08-01 07-31    140  |  106  |  6.6<L>  ----------------------------<  86  4.0   |  27.0  |  0.48<L>    Ca    8.2<L>      31 Jul 2024 05:05  Mg     1.9     07-31    TPro  4.7<L>  /  Alb  2.7<L>  /  TBili  1.0  /  DBili  x   /  AST  81<H>  /  ALT  38<H>  /  AlkPhos  251<H>  07-31    07-29    139  |  108  |  8.8  ----------------------------<  116<H>  3.8   |  26.0  |  0.36<L>    Ca    7.2<L>      29 Jul 2024 14:10    TPro  4.1<L>  /  Alb  2.4<L>  /  TBili  0.4  /  DBili  x   /  AST  58<H>  /  ALT  28  /  AlkPhos  184<H>  07-29

## 2024-08-02 NOTE — PROGRESS NOTE ADULT - SUBJECTIVE AND OBJECTIVE BOX
Interval Hx:  Patient seen during rounds  Patient reports pain to be controlled on current medications  Patient denies sedation with medications     Analgesic Dosing for past 24 hours reviewed as below:    FLUoxetine   80 milliGRAM(s) Oral (08-01-24 @ 21:37)    HYDROmorphone  Injectable   0.5 milliGRAM(s) IV Push (08-01-24 @ 13:51)    memantine   10 milliGRAM(s) Oral (08-02-24 @ 06:05)   10 milliGRAM(s) Oral (08-01-24 @ 17:11)    methadone    Tablet   10 milliGRAM(s) Oral (08-02-24 @ 10:12)   10 milliGRAM(s) Oral (08-01-24 @ 13:51)    methadone    Tablet   15 milliGRAM(s) Oral (08-01-24 @ 21:36)    methylphenidate   20 milliGRAM(s) Oral (08-02-24 @ 11:32)   20 milliGRAM(s) Oral (08-02-24 @ 06:12)    OLANZapine   2.5 milliGRAM(s) Oral (08-01-24 @ 21:37)    ondansetron Injectable   4 milliGRAM(s) IV Push (08-01-24 @ 13:51)    pregabalin   200 milliGRAM(s) Oral (08-02-24 @ 06:06)   200 milliGRAM(s) Oral (08-01-24 @ 21:38)   200 milliGRAM(s) Oral (08-01-24 @ 13:51)          T(C): 36.4 (08-02-24 @ 05:50), Max: 36.7 (08-01-24 @ 20:14)  HR: 63 (08-02-24 @ 08:41) (63 - 82)  BP: 99/64 (08-02-24 @ 08:41) (94/64 - 115/76)  RR: 18 (08-02-24 @ 09:41) (17 - 18)  SpO2: 92% (08-02-24 @ 09:41) (92% - 100%)      08-01-24 @ 07:01  -  08-02-24 @ 07:00  --------------------------------------------------------  IN: 350 mL / OUT: 300 mL / NET: 50 mL        acetaminophen     Tablet .. 650 milliGRAM(s) Oral every 6 hours PRN  chlorhexidine 2% Cloths 1 Application(s) Topical <User Schedule>  diazepam    Tablet 2 milliGRAM(s) Oral two times a day PRN  FLUoxetine 80 milliGRAM(s) Oral at bedtime  heparin  Lock Flush 100 Units/mL Injectable 300 Unit(s) IV Push once  HYDROmorphone   Tablet 2 milliGRAM(s) Oral every 4 hours PRN  HYDROmorphone  Injectable 2 milliGRAM(s) IV Push every 2 hours PRN  HYDROmorphone PCA (1 mG/mL) 30 milliLiter(s) PCA Continuous PCA Continuous  lactulose Syrup 20 Gram(s) Oral three times a day  memantine 10 milliGRAM(s) Oral two times a day  methadone    Tablet 10 milliGRAM(s) Oral <User Schedule>  methadone    Tablet 15 milliGRAM(s) Oral at bedtime  methylphenidate 20 milliGRAM(s) Oral <User Schedule>  octreotide  Injectable 100 MICROGram(s) SubCutaneous two times a day  OLANZapine 2.5 milliGRAM(s) Oral at bedtime  ondansetron Injectable 4 milliGRAM(s) IV Push every 6 hours PRN  pantoprazole    Tablet 40 milliGRAM(s) Oral two times a day  pregabalin 200 milliGRAM(s) Oral three times a day  sodium chloride 0.9%. 1000 milliLiter(s) IV Continuous <Continuous>                          8.5    2.14  )-----------( 124      ( 02 Aug 2024 06:26 )             28.1     08-02    143  |  109<H>  |  6.9<L>  ----------------------------<  93  4.0   |  26.0  |  0.47<L>    Ca    8.2<L>      02 Aug 2024 06:26    TPro  4.6<L>  /  Alb  2.5<L>  /  TBili  0.5  /  DBili  x   /  AST  43<H>  /  ALT  24  /  AlkPhos  199<H>  08-02      Urinalysis Basic - ( 02 Aug 2024 06:26 )    Color: x / Appearance: x / SG: x / pH: x  Gluc: 93 mg/dL / Ketone: x  / Bili: x / Urobili: x   Blood: x / Protein: x / Nitrite: x   Leuk Esterase: x / RBC: x / WBC x   Sq Epi: x / Non Sq Epi: x / Bacteria: x        Pain Service   [phone]

## 2024-08-02 NOTE — PROGRESS NOTE ADULT - ASSESSMENT
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 Chapa (Hem / Onc) for anemia. As per patient, after being discharged from the hospital -- she was doing OK however she was feeling bloated and very winded. Her stools are loose due to stool softeners and lately she noticed stool color is dark. She went for blood work and was found to have Hb of 5.7 so she was sent to [facility].  In ER, H&H 5.1/16.9, she was given 3 PRBCs. Repeat H&H 10/30.4. Dr. Chapa recommended CT Abd / Pelvis and GI Evaluation and admission for close monitoring for 48 hours.     1) Acute on Chronic Anemia due to GAVE  - History of GAVE  - EGD on 7/3: There were many, minute, diffusely scattered red spots in the antrum. Previously seen GAVE improved/resolved. Scattered stellate scars seen consistent with healing. Moderately erythematous mucosa consistent with gastritis. There were many, minute, diffusely scattered red spots throughout the first and second portions of the duodenum. No source of anemia or possible dark stools seen on upper endoscopy. Source of scant red blood seen is likely from known hemorrhoids. Profound anemia is unlikely to be attributable to GI losses.   - She is also on Enhertu, which can cause anemia  - CT Abd / Pelvis is pending   - GI was consulted by ED  - s/p 3 PRBCs  - Gets Venofer and Aranesp as an outpatient  - Monitor H&H  - Hem/Onc following   - Appreciate GI recs- S/p EGD w/ 2 oozing erosions  - CT A/P with mild ascites  - F/u CXR to reassess pleural effusion  - F/u MRI abdomen    2) Metastatic Breast Cancer   - On Enhertu (last dose 6/19)  - Hem / Onc following  - Outpatient follow up with Rad / Onc  - Palliative recs appreciated  - Appreciate pain management recs- started PCA pump    3) Thrombocytopenia  - Chronic and intermittent   - Likely due to Enhertu  - Monitor     4) Anxiety / Mood Disorder  - Continue Fluoxetine, Olanzapine and Diazepam (PRN)    DVT Prophylaxis -- Venodyne     Dispo: Home once stable.

## 2024-08-03 LAB
ALBUMIN SERPL ELPH-MCNC: 2.6 G/DL — LOW (ref 3.3–5.2)
ALP SERPL-CCNC: 241 U/L — HIGH (ref 40–120)
ALT FLD-CCNC: 24 U/L — SIGNIFICANT CHANGE UP
ANION GAP SERPL CALC-SCNC: 8 MMOL/L — SIGNIFICANT CHANGE UP (ref 5–17)
AST SERPL-CCNC: 47 U/L — HIGH
BILIRUB SERPL-MCNC: 0.4 MG/DL — SIGNIFICANT CHANGE UP (ref 0.4–2)
BLD GP AB SCN SERPL QL: SIGNIFICANT CHANGE UP
BUN SERPL-MCNC: 9.1 MG/DL — SIGNIFICANT CHANGE UP (ref 8–20)
CALCIUM SERPL-MCNC: 7.5 MG/DL — LOW (ref 8.4–10.5)
CHLORIDE SERPL-SCNC: 110 MMOL/L — HIGH (ref 96–108)
CO2 SERPL-SCNC: 24 MMOL/L — SIGNIFICANT CHANGE UP (ref 22–29)
CREAT SERPL-MCNC: 0.59 MG/DL — SIGNIFICANT CHANGE UP (ref 0.5–1.3)
EGFR: 119 ML/MIN/1.73M2 — SIGNIFICANT CHANGE UP
GLUCOSE SERPL-MCNC: 104 MG/DL — HIGH (ref 70–99)
HCT VFR BLD CALC: 24.1 % — LOW (ref 34.5–45)
HGB BLD-MCNC: 7.1 G/DL — LOW (ref 11.5–15.5)
MCHC RBC-ENTMCNC: 29.5 GM/DL — LOW (ref 32–36)
MCHC RBC-ENTMCNC: 29.5 PG — SIGNIFICANT CHANGE UP (ref 27–34)
MCV RBC AUTO: 100 FL — SIGNIFICANT CHANGE UP (ref 80–100)
PLATELET # BLD AUTO: 114 K/UL — LOW (ref 150–400)
POTASSIUM SERPL-MCNC: 4.1 MMOL/L — SIGNIFICANT CHANGE UP (ref 3.5–5.3)
POTASSIUM SERPL-SCNC: 4.1 MMOL/L — SIGNIFICANT CHANGE UP (ref 3.5–5.3)
PROT SERPL-MCNC: 4.3 G/DL — LOW (ref 6.6–8.7)
RBC # BLD: 2.41 M/UL — LOW (ref 3.8–5.2)
RBC # FLD: 19 % — HIGH (ref 10.3–14.5)
SODIUM SERPL-SCNC: 142 MMOL/L — SIGNIFICANT CHANGE UP (ref 135–145)
WBC # BLD: 3.85 K/UL — SIGNIFICANT CHANGE UP (ref 3.8–10.5)
WBC # FLD AUTO: 3.85 K/UL — SIGNIFICANT CHANGE UP (ref 3.8–10.5)

## 2024-08-03 PROCEDURE — 99232 SBSQ HOSP IP/OBS MODERATE 35: CPT

## 2024-08-03 RX ORDER — HYDROMORPHONE HCL IN 0.9% NACL 0.2 MG/ML
2 PLASTIC BAG, INJECTION (ML) INTRAVENOUS
Refills: 0 | Status: DISCONTINUED | OUTPATIENT
Start: 2024-08-03 | End: 2024-08-03

## 2024-08-03 RX ORDER — HYDROMORPHONE HCL IN 0.9% NACL 0.2 MG/ML
1 PLASTIC BAG, INJECTION (ML) INTRAVENOUS ONCE
Refills: 0 | Status: DISCONTINUED | OUTPATIENT
Start: 2024-08-03 | End: 2024-08-03

## 2024-08-03 RX ADMIN — Medication 10 MILLIGRAM(S): at 15:50

## 2024-08-03 RX ADMIN — METHYLPHENIDATE HYDROCHLORIDE 20 MILLIGRAM(S): 18 TABLET, EXTENDED RELEASE ORAL at 05:03

## 2024-08-03 RX ADMIN — Medication 2.5 MILLIGRAM(S): at 21:35

## 2024-08-03 RX ADMIN — MEMANTINE HYDROCHLORIDE 10 MILLIGRAM(S): 14 CAPSULE, EXTENDED RELEASE ORAL at 20:03

## 2024-08-03 RX ADMIN — OCTREOTIDE ACETATE 100 MICROGRAM(S): 200 INJECTION INTRAVENOUS at 05:04

## 2024-08-03 RX ADMIN — Medication 4 MILLIGRAM(S): at 11:05

## 2024-08-03 RX ADMIN — PANTOPRAZOLE SODIUM 40 MILLIGRAM(S): 20 TABLET, DELAYED RELEASE ORAL at 20:03

## 2024-08-03 RX ADMIN — Medication 15 MILLIGRAM(S): at 21:34

## 2024-08-03 RX ADMIN — PANTOPRAZOLE SODIUM 40 MILLIGRAM(S): 20 TABLET, DELAYED RELEASE ORAL at 05:02

## 2024-08-03 RX ADMIN — Medication 200 MILLIGRAM(S): at 21:33

## 2024-08-03 RX ADMIN — Medication 30 MILLILITER(S): at 07:41

## 2024-08-03 RX ADMIN — Medication 80 MILLIGRAM(S): at 21:33

## 2024-08-03 RX ADMIN — Medication 200 MILLIGRAM(S): at 14:29

## 2024-08-03 RX ADMIN — Medication 10 MILLIGRAM(S): at 08:53

## 2024-08-03 RX ADMIN — METHYLPHENIDATE HYDROCHLORIDE 20 MILLIGRAM(S): 18 TABLET, EXTENDED RELEASE ORAL at 15:49

## 2024-08-03 RX ADMIN — Medication 30 MILLILITER(S): at 19:37

## 2024-08-03 RX ADMIN — Medication 200 MILLIGRAM(S): at 05:03

## 2024-08-03 RX ADMIN — Medication 2 MILLIGRAM(S): at 12:44

## 2024-08-03 RX ADMIN — MEMANTINE HYDROCHLORIDE 10 MILLIGRAM(S): 14 CAPSULE, EXTENDED RELEASE ORAL at 05:04

## 2024-08-03 RX ADMIN — OCTREOTIDE ACETATE 100 MICROGRAM(S): 200 INJECTION INTRAVENOUS at 20:03

## 2024-08-03 RX ADMIN — Medication 20 GRAM(S): at 14:29

## 2024-08-03 RX ADMIN — CHLORHEXIDINE GLUCONATE 1 APPLICATION(S): 500 CLOTH TOPICAL at 05:04

## 2024-08-03 NOTE — PROGRESS NOTE ADULT - ASSESSMENT
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 Decrease in size of multiple enhancing infra and supratentorial lesions. On surveillance.  - Last PET on 4/3/24 showed Asymmetric foci of hypermetabolism in the right cerebellum corresponding to lesions evident on concurrent MRI.Very mild non significant hypermetabolism associated with some of multiple mixed lytic/sclerotic lesions throughout skeleton.  No significant hypermetabolic foci to suggest metastatic activity   - CT A/P  showed mild layering left pleural effusion. Pt complaining of increasing SOB and CP- recommend CXR    Will arrange follow up with Rad/Onc as an outpt after discharge     Anemia due to GAVE  - Hgb 5.1 on admission  - S/P 3 U PRBC went to 8.5  -HB today 7.1 will receive 1UPRBC today  - Receives iv iron will be given twice outpt LD 7/18  - Aranesp increased to 500mcg from 300 mcg Q 3 weeks LD 7/11  - Octreotide was due yesterday - will order Octreotide 100mcg BID while admitted  - GI following S/P endoscopy-Normal esophagus. Normal duodenum. Friability. There were 2 erosions which were oozing. There were small erythematous spots in the antrum as well. In the antrum. cauterization of oozing erosions in the antrum  -CT Abd / Pelvis  Mild ascites. Splenomegaly. LIVER: Within normal limits. Hepatic veins and portal vein are patent.    - Ammonia level 81   - Ammonia concentration increases in red cell units (RBCs) during storage and pt has been getting frequent transfusions?   - Enhurtu can also raise LFT's  - ferritin 349 ALK Phos  213 ( known bone mets ) AST 77 ALT 37 Drawn in the office on 7/29   - 8/1/2024 MRI ABD w and W/O contrast to evaluate liver Shows No evidence of liver metastases.  Small amount of ascites.      CBC is stable   WBC 3.85 Hgb 7.1 Plt 114k     Will follow

## 2024-08-03 NOTE — PROGRESS NOTE ADULT - SUBJECTIVE AND OBJECTIVE BOX
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 On Octerotide LD 7/18 and iv iron LD weekly LD 7/18 including prior w/u with GI (noted to have telangiectasias on EGD / GAVE) , Sent form the office for a 10 lb weight gain anf hgb of 5.7 Placed in obs Hgb 5.1 S/P 3 U PRNC hgb now 10    8/3/24  Pt seen at bedside awake and alert.  Emotional support provided.  Pt endorsing pain.  Pain management being contacted for reassess pain.  HB downtrended to 7.1 spoke with Dr Sandoval ordered 1 unit PRBC.  Afebrile VS stable.      PAST MEDICAL & SURGICAL HISTORY:  H/O compression fracture of spine  Anxiety  Metastatic breast cancer  H/O pleural effusion  Pericardial effusion  S/P tonsillectomy  H/O chest tube placement  12/23/21  S/P pericardiocentesis  12/28/21    Allergies  pertuzumab (Other (Severe))  Perjeta (Other (Severe))    MEDICATIONS  (STANDING):  chlorhexidine 2% Cloths 1 Application(s) Topical <User Schedule>  FLUoxetine 80 milliGRAM(s) Oral at bedtime  heparin  Lock Flush 100 Units/mL Injectable 300 Unit(s) IV Push once  HYDROmorphone PCA (1 mG/mL) 30 milliLiter(s) PCA Continuous PCA Continuous  lactulose Syrup 20 Gram(s) Oral three times a day  memantine 10 milliGRAM(s) Oral two times a day  methadone    Tablet 10 milliGRAM(s) Oral <User Schedule>  methadone    Tablet 15 milliGRAM(s) Oral at bedtime  methylphenidate 20 milliGRAM(s) Oral <User Schedule>  octreotide  Injectable 100 MICROGram(s) SubCutaneous two times a day  OLANZapine 2.5 milliGRAM(s) Oral at bedtime  pantoprazole    Tablet 40 milliGRAM(s) Oral two times a day  pregabalin 200 milliGRAM(s) Oral three times a day  sodium chloride 0.9%. 1000 milliLiter(s) (75 mL/Hr) IV Continuous <Continuous>    MEDICATIONS  (PRN):  acetaminophen     Tablet .. 650 milliGRAM(s) Oral every 6 hours PRN Temp greater or equal to 38C (100.4F), Mild Pain (1 - 3)  diazepam    Tablet 2 milliGRAM(s) Oral two times a day PRN for anxiety and/or muscle spasm  ondansetron Injectable 4 milliGRAM(s) IV Push every 6 hours PRN Nausea and/or Vomiting    ICU Vital Signs Last 24 Hrs  T(C): 36.5 (03 Aug 2024 11:40), Max: 36.7 (02 Aug 2024 21:05)  T(F): 97.7 (03 Aug 2024 11:40), Max: 98.1 (02 Aug 2024 21:05)  HR: 86 (03 Aug 2024 11:40) (68 - 88)  BP: 104/70 (03 Aug 2024 11:40) (91/56 - 110/72)  BP(mean): --  ABP: --  ABP(mean): --  RR: 18 (03 Aug 2024 11:40) (18 - 18)  SpO2: 98% (03 Aug 2024 11:40) (93% - 100%)    O2 Parameters below as of 03 Aug 2024 11:40  Patient On (Oxygen Delivery Method): room air            PHYSICAL EXAM:   Gen: NAD, pallor  Head: NC/AT  Neck: supple  Card: regular rate and rhythm. Mild ankle edema bilaterally.  Resp:  CTAB  Abd: soft, non-distended   Ext: DENNEY well  Neuro:  A&O,   Skin:  Warm and dry   Psych:  Normal affect and mood       CBC                           7.1    3.85  )-----------( 114      ( 03 Aug 2024 06:15 )             24.1                                      8.5    2.14  )-----------( 124      ( 02 Aug 2024 06:26 )             28.1                9.7    2.35  )-----------( 133      ( 01 Aug 2024 05:39 )             31.7                           9.4    3.29  )-----------( 144      ( 31 Jul 2024 05:05 )             29.2                10.0   3.50  )-----------( 132      ( 30 Jul 2024 07:53 )             30.4   CHEM  08-03    142  |  110<H>  |  9.1  ----------------------------<  104<H>  4.1   |  24.0  |  0.59    Ca    7.5<L>      03 Aug 2024 06:15    TPro  4.3<L>  /  Alb  2.6<L>  /  TBili  0.4  /  DBili  x   /  AST  47<H>  /  ALT  24  /  AlkPhos  241<H>  08-03 08-02    143  |  109<H>  |  6.9<L>  ----------------------------<  93  4.0   |  26.0  |  0.47<L>    Ca    8.2<L>      02 Aug 2024 06:26    TPro  4.6<L>  /  Alb  2.5<L>  /  TBili  0.5  /  DBili  x   /  AST  43<H>  /  ALT  24  /  AlkPhos  199<H>  08-02 08-01    139  |  108  |  6.2<L>  ----------------------------<  70  4.8   |  22.0  |  0.30<L>    Ca    8.2<L>      01 Aug 2024 05:39  Mg     1.9     07-31    TPro  5.2<L>  /  Alb  2.8<L>  /  TBili  1.1  /  DBili  x   /  AST  62<H>  /  ALT  31  /  AlkPhos  226<H>  08-01 07-31    140  |  106  |  6.6<L>  ----------------------------<  86  4.0   |  27.0  |  0.48<L>    Ca    8.2<L>      31 Jul 2024 05:05  Mg     1.9     07-31    TPro  4.7<L>  /  Alb  2.7<L>  /  TBili  1.0  /  DBili  x   /  AST  81<H>  /  ALT  38<H>  /  AlkPhos  251<H>  07-31 07-29    139  |  108  |  8.8  ----------------------------<  116<H>  3.8   |  26.0  |  0.36<L>    Ca    7.2<L>      29 Jul 2024 14:10    TPro  4.1<L>  /  Alb  2.4<L>  /  TBili  0.4  /  DBili  x   /  AST  58<H>  /  ALT  28  /  AlkPhos  184<H>  07-29

## 2024-08-03 NOTE — PROGRESS NOTE ADULT - ASSESSMENT
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 Chapa (Hem / Onc) for anemia. As per patient, after being discharged from the hospital -- she was doing OK however she was feeling bloated and very winded. Her stools are loose due to stool softeners and lately she noticed stool color is dark. She went for blood work and was found to have Hb of 5.7 so she was sent to [facility].  In ER, H&H 5.1/16.9, she was given 3 PRBCs. Repeat H&H 10/30.4. Dr. Chapa recommended CT Abd / Pelvis and GI Evaluation and admission for close monitoring for 48 hours.     1) Acute on Chronic Anemia due to GAVE  - History of GAVE  - EGD on 7/3: There were many, minute, diffusely scattered red spots in the antrum. Previously seen GAVE improved/resolved. Scattered stellate scars seen consistent with healing. Moderately erythematous mucosa consistent with gastritis. There were many, minute, diffusely scattered red spots throughout the first and second portions of the duodenum. No source of anemia or possible dark stools seen on upper endoscopy. Source of scant red blood seen is likely from known hemorrhoids. Profound anemia is unlikely to be attributable to GI losses.   - She is also on Enhertu, which can cause anemia  - CT Abd / Pelvis is pending   - GI was consulted by ED  - s/p 3 PRBCs  - Gets Venofer and Aranesp as an outpatient  - Monitor H&H  - Hem/Onc following   - Appreciate GI recs- S/p EGD w/ 2 oozing erosions  - CT A/P with mild ascites  - MRI abdomen reviewed  - Will transfuse 1U PRBC    2) Metastatic Breast Cancer   - On Enhertu (last dose 6/19)  - Hem / Onc following  - Outpatient follow up with Rad / Onc  - Palliative recs appreciated  - Appreciate pain management recs- started PCA pump    3) Thrombocytopenia  - Chronic and intermittent   - Likely due to Enhertu  - Monitor     4) Anxiety / Mood Disorder  - Continue Fluoxetine, Olanzapine and Diazepam (PRN)    DVT Prophylaxis -- Venodyne     Dispo: Home once stable. Pending pain control and stable H/H

## 2024-08-03 NOTE — PROGRESS NOTE ADULT - SUBJECTIVE AND OBJECTIVE BOX
Patient is a 38y old  Female who presents with a chief complaint of Low blood count (02 Aug 2024 17:09)    INTERVAL HPI/OVERNIGHT EVENTS: Patient continues to endorse pain. No acute events overnight. HD stable. Hg downtrending.     MEDICATIONS  (STANDING):  chlorhexidine 2% Cloths 1 Application(s) Topical <User Schedule>  FLUoxetine 80 milliGRAM(s) Oral at bedtime  heparin  Lock Flush 100 Units/mL Injectable 300 Unit(s) IV Push once  HYDROmorphone PCA (1 mG/mL) 30 milliLiter(s) PCA Continuous PCA Continuous  lactulose Syrup 20 Gram(s) Oral three times a day  memantine 10 milliGRAM(s) Oral two times a day  methadone    Tablet 10 milliGRAM(s) Oral <User Schedule>  methadone    Tablet 15 milliGRAM(s) Oral at bedtime  methylphenidate 20 milliGRAM(s) Oral <User Schedule>  octreotide  Injectable 100 MICROGram(s) SubCutaneous two times a day  OLANZapine 2.5 milliGRAM(s) Oral at bedtime  pantoprazole    Tablet 40 milliGRAM(s) Oral two times a day  pregabalin 200 milliGRAM(s) Oral three times a day  sodium chloride 0.9%. 1000 milliLiter(s) (75 mL/Hr) IV Continuous <Continuous>    MEDICATIONS  (PRN):  acetaminophen     Tablet .. 650 milliGRAM(s) Oral every 6 hours PRN Temp greater or equal to 38C (100.4F), Mild Pain (1 - 3)  diazepam    Tablet 2 milliGRAM(s) Oral two times a day PRN for anxiety and/or muscle spasm  ondansetron Injectable 4 milliGRAM(s) IV Push every 6 hours PRN Nausea and/or Vomiting      Allergies    pertuzumab (Other (Severe))  Perjeta (Other (Severe))    Intolerances        REVIEW OF SYSTEMS: all negative with exception of above    Vital Signs Last 24 Hrs  T(C): 36.4 (03 Aug 2024 07:30), Max: 36.7 (02 Aug 2024 21:05)  T(F): 97.6 (03 Aug 2024 07:30), Max: 98.1 (02 Aug 2024 21:05)  HR: 88 (03 Aug 2024 07:30) (68 - 88)  BP: 97/60 (03 Aug 2024 07:30) (91/56 - 110/72)  BP(mean): --  RR: 18 (03 Aug 2024 07:30) (18 - 18)  SpO2: 100% (03 Aug 2024 07:30) (93% - 100%)    Parameters below as of 03 Aug 2024 07:30  Patient On (Oxygen Delivery Method): room air        PHYSICAL EXAM:  GENERAL: NAD, well-groomed, well-developed  HEAD:  Atraumatic, Normocephalic  EYES: EOMI, PERRLA, conjunctiva and sclera clear  ENMT: No tonsillar erythema, exudates, or enlargement; Moist mucous membranes, Good dentition, No lesions  NECK: Supple, No JVD, Normal thyroid  NERVOUS SYSTEM:  Alert & Oriented X3, Good concentration; Motor Strength 5/5 B/L upper and lower extremities; DTRs 2+ intact and symmetric  CHEST/LUNG: Clear to percussion bilaterally; No rales, rhonchi, wheezing, or rubs  HEART: Regular rate and rhythm; No murmurs, rubs, or gallops  ABDOMEN: Soft, Nontender, Nondistended; Bowel sounds present    LABS:                        7.1    3.85  )-----------( 114      ( 03 Aug 2024 06:15 )             24.1     08-03    142  |  110<H>  |  9.1  ----------------------------<  104<H>  4.1   |  24.0  |  0.59    Ca    7.5<L>      03 Aug 2024 06:15    TPro  4.3<L>  /  Alb  2.6<L>  /  TBili  0.4  /  DBili  x   /  AST  47<H>  /  ALT  24  /  AlkPhos  241<H>  08-03      Urinalysis Basic - ( 03 Aug 2024 06:15 )    Color: x / Appearance: x / SG: x / pH: x  Gluc: 104 mg/dL / Ketone: x  / Bili: x / Urobili: x   Blood: x / Protein: x / Nitrite: x   Leuk Esterase: x / RBC: x / WBC x   Sq Epi: x / Non Sq Epi: x / Bacteria: x      CAPILLARY BLOOD GLUCOSE          RADIOLOGY & ADDITIONAL TESTS:    Imaging Personally Reviewed:  [ ] YES  [ ] NO  < from: MR Abdomen w/wo IV Cont (08.01.24 @ 18:37) >  IMPRESSION:    No evidence of liver metastases.  Small amount of ascites.          --- End of Report ---            CHAD JO MD; Attending Radiologist  This document has been electronically signed. Aug  2 2024  2:51PM    < end of copied text >  < from: Xray Chest 1 View AP/PA. (08.02.24 @ 13:38) >  IMPRESSION: Persistent mass left upper lobe. Increasing interstitial   markings off both sharon.    --- End of Report ---            SERA BURDICK MD; Attending Radiologist  This document has been electronically signed. Aug  2 2024  3:48PM    < end of copied text >        Consultant(s) Notes Reviewed:  [ ] YES  [ ] NO    Care Discussed with Consultants/Other Providers [ ] YES  [ ] NO

## 2024-08-04 DIAGNOSIS — R91.8 OTHER NONSPECIFIC ABNORMAL FINDING OF LUNG FIELD: ICD-10-CM

## 2024-08-04 DIAGNOSIS — J45.20 MILD INTERMITTENT ASTHMA, UNCOMPLICATED: ICD-10-CM

## 2024-08-04 LAB
ALBUMIN SERPL ELPH-MCNC: 2.5 G/DL — LOW (ref 3.3–5.2)
ALP SERPL-CCNC: 229 U/L — HIGH (ref 40–120)
ALT FLD-CCNC: 22 U/L — SIGNIFICANT CHANGE UP
ANION GAP SERPL CALC-SCNC: 11 MMOL/L — SIGNIFICANT CHANGE UP (ref 5–17)
AST SERPL-CCNC: 46 U/L — HIGH
BILIRUB SERPL-MCNC: 0.6 MG/DL — SIGNIFICANT CHANGE UP (ref 0.4–2)
BUN SERPL-MCNC: 8.4 MG/DL — SIGNIFICANT CHANGE UP (ref 8–20)
CALCIUM SERPL-MCNC: 7.4 MG/DL — LOW (ref 8.4–10.5)
CHLORIDE SERPL-SCNC: 106 MMOL/L — SIGNIFICANT CHANGE UP (ref 96–108)
CO2 SERPL-SCNC: 23 MMOL/L — SIGNIFICANT CHANGE UP (ref 22–29)
CREAT SERPL-MCNC: 0.42 MG/DL — LOW (ref 0.5–1.3)
EGFR: 129 ML/MIN/1.73M2 — SIGNIFICANT CHANGE UP
GLUCOSE SERPL-MCNC: 128 MG/DL — HIGH (ref 70–99)
HCT VFR BLD CALC: 27.4 % — LOW (ref 34.5–45)
HGB BLD-MCNC: 8.5 G/DL — LOW (ref 11.5–15.5)
MCHC RBC-ENTMCNC: 29.6 PG — SIGNIFICANT CHANGE UP (ref 27–34)
MCHC RBC-ENTMCNC: 31 GM/DL — LOW (ref 32–36)
MCV RBC AUTO: 95.5 FL — SIGNIFICANT CHANGE UP (ref 80–100)
PLATELET # BLD AUTO: 137 K/UL — LOW (ref 150–400)
POTASSIUM SERPL-MCNC: 3.7 MMOL/L — SIGNIFICANT CHANGE UP (ref 3.5–5.3)
POTASSIUM SERPL-SCNC: 3.7 MMOL/L — SIGNIFICANT CHANGE UP (ref 3.5–5.3)
PROT SERPL-MCNC: 4.6 G/DL — LOW (ref 6.6–8.7)
RBC # BLD: 2.87 M/UL — LOW (ref 3.8–5.2)
RBC # FLD: 19.3 % — HIGH (ref 10.3–14.5)
SODIUM SERPL-SCNC: 140 MMOL/L — SIGNIFICANT CHANGE UP (ref 135–145)
WBC # BLD: 3.76 K/UL — LOW (ref 3.8–10.5)
WBC # FLD AUTO: 3.76 K/UL — LOW (ref 3.8–10.5)

## 2024-08-04 PROCEDURE — 99232 SBSQ HOSP IP/OBS MODERATE 35: CPT

## 2024-08-04 PROCEDURE — 99223 1ST HOSP IP/OBS HIGH 75: CPT

## 2024-08-04 RX ORDER — BUDESONIDE AND FORMOTEROL FUMARATE DIHYDRATE 80; 4.5 UG/1; UG/1
2 AEROSOL RESPIRATORY (INHALATION)
Refills: 0 | Status: DISCONTINUED | OUTPATIENT
Start: 2024-08-04 | End: 2024-08-07

## 2024-08-04 RX ADMIN — PANTOPRAZOLE SODIUM 40 MILLIGRAM(S): 20 TABLET, DELAYED RELEASE ORAL at 05:18

## 2024-08-04 RX ADMIN — Medication 10 MILLIGRAM(S): at 08:34

## 2024-08-04 RX ADMIN — Medication 15 MILLIGRAM(S): at 21:16

## 2024-08-04 RX ADMIN — MEMANTINE HYDROCHLORIDE 10 MILLIGRAM(S): 14 CAPSULE, EXTENDED RELEASE ORAL at 18:55

## 2024-08-04 RX ADMIN — OCTREOTIDE ACETATE 100 MICROGRAM(S): 200 INJECTION INTRAVENOUS at 18:55

## 2024-08-04 RX ADMIN — METHYLPHENIDATE HYDROCHLORIDE 20 MILLIGRAM(S): 18 TABLET, EXTENDED RELEASE ORAL at 05:17

## 2024-08-04 RX ADMIN — Medication 80 MILLIGRAM(S): at 21:16

## 2024-08-04 RX ADMIN — METHYLPHENIDATE HYDROCHLORIDE 20 MILLIGRAM(S): 18 TABLET, EXTENDED RELEASE ORAL at 11:23

## 2024-08-04 RX ADMIN — Medication 2 MILLIGRAM(S): at 00:27

## 2024-08-04 RX ADMIN — Medication 200 MILLIGRAM(S): at 21:15

## 2024-08-04 RX ADMIN — PANTOPRAZOLE SODIUM 40 MILLIGRAM(S): 20 TABLET, DELAYED RELEASE ORAL at 18:56

## 2024-08-04 RX ADMIN — Medication 2 MILLIGRAM(S): at 21:17

## 2024-08-04 RX ADMIN — MEMANTINE HYDROCHLORIDE 10 MILLIGRAM(S): 14 CAPSULE, EXTENDED RELEASE ORAL at 05:18

## 2024-08-04 RX ADMIN — OCTREOTIDE ACETATE 100 MICROGRAM(S): 200 INJECTION INTRAVENOUS at 05:17

## 2024-08-04 RX ADMIN — Medication 20 GRAM(S): at 13:51

## 2024-08-04 RX ADMIN — CHLORHEXIDINE GLUCONATE 1 APPLICATION(S): 500 CLOTH TOPICAL at 05:18

## 2024-08-04 RX ADMIN — Medication 200 MILLIGRAM(S): at 13:51

## 2024-08-04 RX ADMIN — Medication 200 MILLIGRAM(S): at 05:17

## 2024-08-04 RX ADMIN — Medication 10 MILLIGRAM(S): at 13:51

## 2024-08-04 RX ADMIN — Medication 2.5 MILLIGRAM(S): at 21:17

## 2024-08-04 RX ADMIN — Medication 30 MILLILITER(S): at 07:37

## 2024-08-04 RX ADMIN — BUDESONIDE AND FORMOTEROL FUMARATE DIHYDRATE 2 PUFF(S): 80; 4.5 AEROSOL RESPIRATORY (INHALATION) at 21:30

## 2024-08-04 RX ADMIN — Medication 30 MILLILITER(S): at 19:29

## 2024-08-04 NOTE — PROGRESS NOTE ADULT - ASSESSMENT
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 Decrease in size of multiple enhancing infra and supratentorial lesions. On surveillance.  - Last PET on 4/3/24 showed Asymmetric foci of hypermetabolism in the right cerebellum corresponding to lesions evident on concurrent MRI.Very mild non significant hypermetabolism associated with some of multiple mixed lytic/sclerotic lesions throughout skeleton.  No significant hypermetabolic foci to suggest metastatic activity   - CT A/P  showed mild layering left pleural effusion. Pt complaining of increasing SOB and CP- recommend CXR    Will arrange follow up with Rad/Onc as an outpt after discharge     Anemia due to GAVE  - Hgb 5.1 on admission  - S/P 3 U PRBC went to 8.5  -HB 7.1 8/3/24 s/p 1UPRBC today HB 8.5  - Receives iv iron will be given twice outpt LD 7/18  - Aranesp increased to 500mcg from 300 mcg Q 3 weeks LD 7/11  - Octreotide was due yesterday - will order Octreotide 100mcg BID while admitted  - GI following S/P endoscopy-Normal esophagus. Normal duodenum. Friability. There were 2 erosions which were oozing. There were small erythematous spots in the antrum as well. In the antrum. cauterization of oozing erosions in the antrum  -CT Abd / Pelvis  Mild ascites. Splenomegaly. LIVER: Within normal limits. Hepatic veins and portal vein are patent.    - Ammonia level 81   - Ammonia concentration increases in red cell units (RBCs) during storage and pt has been getting frequent transfusions?   - Enhurtu can also raise LFT's  - ferritin 349 ALK Phos  213 ( known bone mets ) AST 77 ALT 37 Drawn in the office on 7/29   - 8/1/2024 MRI ABD w and W/O contrast to evaluate liver Shows No evidence of liver metastases.  Small amount of ascites.      CBC is stable   WBC 3.76 Hgb 8.5 (s/p 1uPRBC) Plt 114k     Will follow

## 2024-08-04 NOTE — CONSULT NOTE ADULT - ASSESSMENT
Patient is a 38y woman presenting to the ER for recurrent anemia. Patient is familiar to the GI department and has gotten an endoscopic study early in 7/2024 which showed resolution of GAVE but red spots in the antrum and the duodenum. Pending CTAB w/contrast. Patient is a candidate for endoscopy.
37 y/o female with hx of Stage-4 Breast cancer with diffuse osseous mets, on home methadone for chronic pain  uncontrolled pain on iv dilaudid    will order dPCA 0/0.3/8/6    please dc other opioid when pca is set up    cont home methadone 10/10//15        when due for discharge:  DC PCA  - Recommend starting dilaudid PO 4mg/8mg [id number] PRN mod/severe pain
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    Please  feel free to reach out with any questions or suggestions    DARSHAN VERDUZCO    PULMONARY and CRITICAL CARE   [facility] Physician [facility]     [phone]      - 
38 year old female with metastatic stage 4 breast cancer to brain and bone on active treatment with Enhertu and s/p WBRT, gastric antral vascular ectasia, multiple hospitalizations now sent from oncologist for profound acute anemia. S/P transfusions and EGD with erosions in antrum and duodenum s/p cauterization. Palliative consulted for support and to assist with ongoing goals of care.    Profound Acute Anemia  Hx of Gastric Antral Vascular Ectasia  - s/p multiple prbc transfusion with improvement  - GI input appreciated. She had recent EGD in early 7/2024 which showed resolution of GAVE but red spots in the antrum and the duodenum  - s/p repeat EGD 7/31 with notable oozing antral and duodenal erosions s/p cauterization   - trend cbc and transfuse prn    Metastatic Breast Cancer to bone and Brain   - S/P Taxoetere/Herceptin and S/P WBRT  - on active treatment with Enhertu with last dose 6/19  - follows primary oncologist Dr Chapa @ [facility]  - oncology input appreciated:  Last PET on 4/3/24 showed Asymmetric foci of hypermetabolism in the right cerebellum corresponding to lesions evident on concurrent MRI.Very mild non significant hypermetabolism associated with some of multiple mixed lytic/sclerotic lesions throughout skeleton. No significant hypermetabolic foci to suggest metastatic activity. MRI of brain preformed 7/2/2024 2 mm enhancing lesion involving the right frontal lobe not seen on the prior study. Decrease in size of multiple enhancing infra and supratentorial lesions. On surveillance.  - will need eventual follow up with primary oncologist and rad oncology      Cancer Related Pain  - ISTOP checked, see above  - follows with palliative outpatient Dr Benson at [facility]   - has been seen multiple times by pain team during prior admissions --> pain consulted by hospitalist, will defer to them for ongoing medication titration  - bowel regimen for constipation prophylaxis    Advance Care Planning  Palliative Care Encounter  - HCP has been completed in the past, designated to sister Arianne, advised to bring in copy for our records  - see goc above   - GOC: no limitation to care, full code, wants to continue pursuit of ongoing disease directed therapy for her cancer on discharge - will follow up with her primary oncologist    Recommend advance illness program and [employer] referral for additional supportive services.    Palliative team will support and follow course

## 2024-08-04 NOTE — CONSULT NOTE ADULT - SUBJECTIVE AND OBJECTIVE BOX
PULMONARY CONSULT NOTE    Elida Dave  MRN   [id number]    Patient is a 38y old  Female who presents with a chief complaint of Low blood count (04 Aug 2024 14:00)      BRIEF HOSPITAL COURSE: ***/ HPI  39y/o female born in [place] never smoker  denies vaping   h/o asthma childhood then  mild intermittent,    h/o  stage 4  breast cancer    -h/o 2021 - patient with h/o lymphangitic  spread to the lungs with   malignant pleural effusion on home oxygen   -h/o 12/2021  [facility]-  perjeta  chemo -   immunotherapy related  pneumonitis  requiring  pigtail  drainage with oxygen at home then weaned off  -    s/p  taxotere herceptin with  brain mets    s/p   WBRT  on  Enhertu  ( last does   6/19)  anemia  gastric  vascular  ectasia  pericardial effusion   compression fractures   - last admission   7/25/2024  --  some cough dry   - no inhaler  use now   -some chest tightntess at times  -    REVIEW OF SYSTEMS     CONSTITUTIONAL   no fevers  no loss of appetite  no weight loss   HEENT  no sore throat   no ringing in ears  NECK   no pain   RESPIRATORY  see HPI   CARDIOVASCULAR  no chest  pain no palpitations   GASTROINTESTINAL no vomiting  no diarrhea    no   gerd   MUSCULOSKELETAL  no joint pains    no  back pain   SKIN   no rash   no itchiness   GENITOURINARY  no dysuria   HEME    no bleeding or bruising   ENDOCRINE     no   warmth   no  sweating   no  cold intolerance   NEUROLOGIC  no tremors  no seizures  no    weakness    PSYCHIATRIC   no mood disorder    no delirium   ***    PAST MEDICAL & SURGICAL HISTORY:  H/O compression fracture of spine      Anxiety      Metastatic breast cancer      H/O pleural effusion      Pericardial effusion      S/P tonsillectomy      H/O chest tube placement  12/23/21      S/P pericardiocentesis  12/28/21            Medications:        acetaminophen     Tablet .. 650 milliGRAM(s) Oral every 6 hours PRN  diazepam    Tablet 2 milliGRAM(s) Oral two times a day PRN  FLUoxetine 80 milliGRAM(s) Oral at bedtime  HYDROmorphone PCA (1 mG/mL) 30 milliLiter(s) PCA Continuous PCA Continuous  memantine 10 milliGRAM(s) Oral two times a day  methadone    Tablet 10 milliGRAM(s) Oral <User Schedule>  methadone    Tablet 15 milliGRAM(s) Oral at bedtime  methylphenidate 20 milliGRAM(s) Oral <User Schedule>  OLANZapine 2.5 milliGRAM(s) Oral at bedtime  ondansetron Injectable 4 milliGRAM(s) IV Push every 6 hours PRN  pregabalin 200 milliGRAM(s) Oral three times a day      heparin  Lock Flush 100 Units/mL Injectable 300 Unit(s) IV Push once    lactulose Syrup 20 Gram(s) Oral three times a day  pantoprazole    Tablet 40 milliGRAM(s) Oral two times a day      octreotide  Injectable 100 MICROGram(s) SubCutaneous two times a day    sodium chloride 0.9%. 1000 milliLiter(s) IV Continuous <Continuous>      chlorhexidine 2% Cloths 1 Application(s) Topical <User Schedule>            ICU Vital Signs Last 24 Hrs  T(C): 36.4 (04 Aug 2024 12:06), Max: 37.3 (03 Aug 2024 16:30)  T(F): 97.6 (04 Aug 2024 12:06), Max: 99.1 (03 Aug 2024 16:30)  HR: 72 (04 Aug 2024 12:06) (72 - 100)  BP: 101/71 (04 Aug 2024 12:06) (101/70 - 114/76)  BP(mean): --  ABP: --  ABP(mean): --  RR: 18 (04 Aug 2024 12:06) (16 - 18)  SpO2: 100% (04 Aug 2024 12:06) (94% - 100%)    O2 Parameters below as of 04 Aug 2024 12:06  Patient On (Oxygen Delivery Method): room air                I&O's Detail        LABS:                        8.5    3.76  )-----------( 137      ( 04 Aug 2024 06:15 )             27.4     08-04    140  |  106  |  8.4  ----------------------------<  128<H>  3.7   |  23.0  |  0.42<L>    Ca    7.4<L>      04 Aug 2024 06:15    TPro  4.6<L>  /  Alb  2.5<L>  /  TBili  0.6  /  DBili  x   /  AST  46<H>  /  ALT  22  /  AlkPhos  229<H>  08-04          CAPILLARY BLOOD GLUCOSE          Urinalysis Basic - ( 04 Aug 2024 06:15 )    Color: x / Appearance: x / SG: x / pH: x  Gluc: 128 mg/dL / Ketone: x  / Bili: x / Urobili: x   Blood: x / Protein: x / Nitrite: x   Leuk Esterase: x / RBC: x / WBC x   Sq Epi: x / Non Sq Epi: x / Bacteria: x      CULTURES:      Physical Examination:    General: No acute distress.   no cough  speaking full sentences     HEENT: Pupils equal, reactive to light.  Symmetric. no thrush     PULM: Clear to auscultation bilaterally, no significant sputum production   permacath clean     NECK: Supple, no lymphadenopathy, trachea midline    CVS: Regular rate and rhythm, no murmurs, rubs, or gallops    ABD: Soft, nondistended, nontender, normoactive bowel sounds, no masses    EXT: No edema, nontender    SKIN: Warm and well perfused, no rashes noted.  tattos  back legs     NEURO: Alert, oriented, interactive, nonfocal    DEVICES:     RADIOLOGY: **CC: [id number] EXAM:  XR CHEST AP OR PA 1V   ORDERED BY: LEONCIO NEELY     PROCEDURE DATE:  08/02/2024          INTERPRETATION:  AP erect chest on August 2, 2024 at 1:12 PM. Patient has   abnormal laboratory values. There is history of metastatic breast cancer.    Heart size normal. Right-sided port again noted.    Masslike density left upper outer chest is again noted.    There are increasing interstitial markings of both sharon compared to July 21.    IMPRESSION: Persistent mass left upper lobe. Increasing interstitial   markings off both sharon.    --- End of Report ---            SERA BURDICK MD; Attending Radiologist  This document has been electronically signed. Aug  2 2024  3:48PM  ACC: [id number] EXAM:  CT ABDOMEN AND PELVIS IC   ORDERED BY: TENA MUNSON     PROCEDURE DATE:  07/30/2024          INTERPRETATION:  CLINICAL INFORMATION: Left lower quadrant pain.   Metastatic breast cancer.    COMPARISON: CT pulmonary angiogram July 21, 2024, contrast-enhanced CT of   the abdomen and pelvis June 14, 2024.    CONTRAST/COMPLICATIONS:  IV Contrast: Omnipaque 350  90 cc administered   10 cc discarded  Oral Contrast: NONE  Complications: None reported at time of study completion    PROCEDURE:  CT of the Abdomen and Pelvis was performed.  Sagittal and coronal reformats were performed.    FINDINGS:  LOWER CHEST: Central venous catheter tip in the right atrium. Again is   noted coarse parenchymal opacity in the left upper lobe and mild   peribronchovascular opacities in the left lower lobe. There is a mild   layering left pleural effusion.    LIVER: Within normal limits. Hepatic veins and portal vein are patent.  BILE DUCTS: Normal caliber.  GALLBLADDER: Distended.  SPLEEN: The spleen is increased in size to 14.5 cm in maximal axial   dimension. The splenic artery and vein are patent.  PANCREAS: Within normal limits.  ADRENALS: Within normal limits.  KIDNEYS/URETERS: Within normal limits.    BLADDER: Within normal limits.  REPRODUCTIVE ORGANS: Uterus and adnexa within normal limits.    BOWEL: No bowel obstruction. Appendix is normal.  PERITONEUM/RETROPERITONEUM: Mild ascites.  VESSELS: Within normal limits.  LYMPH NODES: No lymphadenopathy.  ABDOMINAL WALL: Within normal limits.  BONES: Again is noted extensive mixed lytic and blastic osseous   metastatic disease, status post vertebroplasty at T12 and L1.    IMPRESSION: Mild ascites. Splenomegaly.    --- End of Report ---            JASPAL TRACY MD; Attending Radiologist  This document has been electronically signed. Jul 30 2024  2:33PM  ACC: [id number] EXAM:  CT ANGIO CHEST PULM [facility]   ORDERED BY: VEENA MESSINA     PROCEDURE DATE:  07/21/2024          INTERPRETATION:  EXAMINATION: CT ANGIO CHEST PULMONARY ARTERY    CLINICAL INDICATION: Dyspnea    TECHNIQUE: CTA of the chest was performed after the administration of 35   cc administered of Omnipaque 350, 65 cc discarded.  MIP images were   reconstructed.    COMPARISON: Multiple CT, most recent 3/27/2024.    FINDINGS:    PULMONARY EMBOLISM: Limited evaluation for segmental and subsegmental   pulmonary embolism. No main, left right, or lobar pulmonary embolism..    AIRWAYS AND LUNGS: The central tracheobronchial tree is patent.  Patchy   bilateral lung opacities obscure evaluation of underlying left upper lobe   consolidation and bronchiectasis.    MEDIASTINUM AND PLEURA: There are no enlarged mediastinal, hilar or   axillary lymph nodes. The visualized portion of the thyroid gland is   unremarkable. Small loculated left pleural effusion. There is no   pneumothorax.    HEART AND VESSELS: The heart is normal in size.   There are no   atherosclerotic calcifications of the aorta.  There is no pericardial   effusion.  Mediport with tip in right atrium    UPPER ABDOMEN: Images of the upper abdomen demonstrate ascites.    BONES AND SOFT TISSUES: Lytic and sclerotic bony lesions  The soft   tissues are unremarkable.      IMPRESSION:  Limited evaluation for segmental and subsegmental pulmonary embolism. No   main, left right, or lobar pulmonary embolism.    Patchy bilateral lung opacities which may be infectious or inflammatory   obscure evaluation of underlying not resolved left upper lobe   consolidation and bronchiectasis. Short-term follow-up chest CT   recommended.    --- End of Report ---            SAROJ ORTIZ MD; Attending Radiologist  This document has been electronically signed. Jul 21 2024  5:55PM  IMPRESSION:  Liver masses, bone lesions, and mildly enlarged lymph nodes in the chest and abdomen, highly concerning for metastatic disease.    Interlobular septal thickening in the lungs which may represent edema or lymphangitic spread of tumor. Mild patchy groundglass opacities may be related to edema or infection although neoplasm cannotbe excluded. Small nodular densities measuring up to 7 mm in the left upper lobe. These may be benign although metastatic disease cannot be excluded.        --- End of Report ---               SUZANNA UPTON MD; Attending Radiologist   This document hasbeen electronically signed. Nov  3 2021  3:44PM  *    CRITICAL CARE TIME SPENT: ***

## 2024-08-04 NOTE — PROGRESS NOTE ADULT - SUBJECTIVE AND OBJECTIVE BOX
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 On Octerotide LD 7/18 and iv iron LD weekly LD 7/18 including prior w/u with GI (noted to have telangiectasias on EGD / GAVE) , Sent form the office for a 10 lb weight gain anf hgb of 5.7 Placed in obs Hgb 5.1 S/P 3 U PRNC hgb now 10    8/4/24  Pt seen at bedside awake and alert.  Emotional support provided.  Pt endorsing pain.  Pain management being contacted for reassess pain, spoke with attending and is aware.  Pt also concerned about her sm pleural effusion, wants pulmonary on board, spoke with attending will speak to pt about this.  HB 8.5 s/p 1UPRBC  Afebrile VS stable.      PAST MEDICAL & SURGICAL HISTORY:  H/O compression fracture of spine  Anxiety  Metastatic breast cancer  H/O pleural effusion  Pericardial effusion  S/P tonsillectomy  H/O chest tube placement  12/23/21  S/P pericardiocentesis  12/28/21    Allergies  pertuzumab (Other (Severe))  Perjeta (Other (Severe))    MEDICATIONS  (STANDING):  chlorhexidine 2% Cloths 1 Application(s) Topical <User Schedule>  FLUoxetine 80 milliGRAM(s) Oral at bedtime  heparin  Lock Flush 100 Units/mL Injectable 300 Unit(s) IV Push once  HYDROmorphone PCA (1 mG/mL) 30 milliLiter(s) PCA Continuous PCA Continuous  lactulose Syrup 20 Gram(s) Oral three times a day  memantine 10 milliGRAM(s) Oral two times a day  methadone    Tablet 10 milliGRAM(s) Oral <User Schedule>  methadone    Tablet 15 milliGRAM(s) Oral at bedtime  methylphenidate 20 milliGRAM(s) Oral <User Schedule>  octreotide  Injectable 100 MICROGram(s) SubCutaneous two times a day  OLANZapine 2.5 milliGRAM(s) Oral at bedtime  pantoprazole    Tablet 40 milliGRAM(s) Oral two times a day  pregabalin 200 milliGRAM(s) Oral three times a day  sodium chloride 0.9%. 1000 milliLiter(s) (75 mL/Hr) IV Continuous <Continuous>    MEDICATIONS  (PRN):  acetaminophen     Tablet .. 650 milliGRAM(s) Oral every 6 hours PRN Temp greater or equal to 38C (100.4F), Mild Pain (1 - 3)  diazepam    Tablet 2 milliGRAM(s) Oral two times a day PRN for anxiety and/or muscle spasm  ondansetron Injectable 4 milliGRAM(s) IV Push every 6 hours PRN Nausea and/or Vomiting      Vital Signs Last 24 Hrs  T(C): 36.6 (04 Aug 2024 08:30), Max: 37.3 (03 Aug 2024 16:30)  T(F): 97.8 (04 Aug 2024 08:30), Max: 99.1 (03 Aug 2024 16:30)  HR: 84 (04 Aug 2024 08:30) (72 - 100)  BP: 110/73 (04 Aug 2024 08:30) (101/70 - 114/76)  BP(mean): --  RR: 17 (04 Aug 2024 08:30) (16 - 18)  SpO2: 96% (04 Aug 2024 08:30) (94% - 99%)    Parameters below as of 04 Aug 2024 08:30  Patient On (Oxygen Delivery Method): room air              PHYSICAL EXAM:   Gen: NAD  Head: NC/AT  Neck: supple  Card: regular rate and rhythm. Mild ankle edema bilaterally.  Resp:  CTAB  Abd: soft, non-distended   Ext: DENNEY well  Neuro:  A&O,   Skin:  Warm and dry   Psych:  Normal affect and mood       CBC                           8.5    3.76  )-----------( 137      ( 04 Aug 2024 06:15 )             27.4                           7.1    3.85  )-----------( 114      ( 03 Aug 2024 06:15 )             24.1                                      8.5    2.14  )-----------( 124      ( 02 Aug 2024 06:26 )             28.1                9.7    2.35  )-----------( 133      ( 01 Aug 2024 05:39 )             31.7                           9.4    3.29  )-----------( 144      ( 31 Jul 2024 05:05 )             29.2                10.0   3.50  )-----------( 132      ( 30 Jul 2024 07:53 )             30.4   CHEM  08-04    140  |  106  |  8.4  ----------------------------<  128<H>  3.7   |  23.0  |  0.42<L>    Ca    7.4<L>      04 Aug 2024 06:15    TPro  4.6<L>  /  Alb  2.5<L>  /  TBili  0.6  /  DBili  x   /  AST  46<H>  /  ALT  22  /  AlkPhos  229<H>  08-04 08-03    142  |  110<H>  |  9.1  ----------------------------<  104<H>  4.1   |  24.0  |  0.59    Ca    7.5<L>      03 Aug 2024 06:15    TPro  4.3<L>  /  Alb  2.6<L>  /  TBili  0.4  /  DBili  x   /  AST  47<H>  /  ALT  24  /  AlkPhos  241<H>  08-03 08-02    143  |  109<H>  |  6.9<L>  ----------------------------<  93  4.0   |  26.0  |  0.47<L>    Ca    8.2<L>      02 Aug 2024 06:26    TPro  4.6<L>  /  Alb  2.5<L>  /  TBili  0.5  /  DBili  x   /  AST  43<H>  /  ALT  24  /  AlkPhos  199<H>  08-02 08-01    139  |  108  |  6.2<L>  ----------------------------<  70  4.8   |  22.0  |  0.30<L>    Ca    8.2<L>      01 Aug 2024 05:39  Mg     1.9     07-31    TPro  5.2<L>  /  Alb  2.8<L>  /  TBili  1.1  /  DBili  x   /  AST  62<H>  /  ALT  31  /  AlkPhos  226<H>  08-01 07-31    140  |  106  |  6.6<L>  ----------------------------<  86  4.0   |  27.0  |  0.48<L>    Ca    8.2<L>      31 Jul 2024 05:05  Mg     1.9     07-31    TPro  4.7<L>  /  Alb  2.7<L>  /  TBili  1.0  /  DBili  x   /  AST  81<H>  /  ALT  38<H>  /  AlkPhos  251<H>  07-31 07-29    139  |  108  |  8.8  ----------------------------<  116<H>  3.8   |  26.0  |  0.36<L>    Ca    7.2<L>      29 Jul 2024 14:10    TPro  4.1<L>  /  Alb  2.4<L>  /  TBili  0.4  /  DBili  x   /  AST  58<H>  /  ALT  28  /  AlkPhos  184<H>  07-29

## 2024-08-04 NOTE — PROGRESS NOTE ADULT - ASSESSMENT
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 Chapa (Hem / Onc) for anemia. As per patient, after being discharged from the hospital -- she was doing OK however she was feeling bloated and very winded. Her stools are loose due to stool softeners and lately she noticed stool color is dark. She went for blood work and was found to have Hb of 5.7 so she was sent to [facility].  In ER, H&H 5.1/16.9, she was given 3 PRBCs. Repeat H&H 10/30.4. Dr. Chapa recommended CT Abd / Pelvis and GI Evaluation and admission for close monitoring for 48 hours.     Acute on Chronic Anemia due to GAVE  - She is also on Enhertu, which can cause anemia  - s/p 4 PRBCs, last done 8/3  - Gets Venofer and Aranesp as an outpatient  - GI following, s/p EGD w/ 2 oozing erosions s/p cauterization  - monitor hgb trend  - Hem/Onc following   - protonix BID     Metastatic Breast Cancer   - On Enhertu (last dose 6/19)  - Hem / Onc following  - Outpatient follow up with Rad / Onc  - Palliative recs appreciated  - pain management following and managing PCA pump  - c/w home pregabalin, methadone    Hx of elevated ammonia levels with encephalopathy  - c/w home lactulose     Thrombocytopenia  - Chronic and intermittent   - Likely due to Enhertu  - Monitor      Anxiety / Mood Disorder  - Continue Fluoxetine, Olanzapine and Diazepam (PRN)    DVT Prophylaxis -- Venodyne     Dispo: Home once stable. Pending pain control and stable H/H   38 year old female with history of Stage 4 Breast Cancer s/p Taxotere / Herceptin with Brain Mets s/p WBRT now on Enhertu (Last Dose on 6/19), Chronic Iron Deficiency Anemia likely due to Gastric Antral Vascular Ectasia (GAVE),  Pericardial Effusion, Spinal Compression Fractures and recent hospitalizations (last one 10 days ago, discharged on 7/25/24) sent by Dr. Chapa (Hem / Onc) for anemia. As per patient, after being discharged from the hospital -- she was doing OK however she was feeling bloated and very winded. Her stools are loose due to stool softeners and lately she noticed stool color is dark. She went for blood work and was found to have Hb of 5.7 so she was sent to [facility].  In ER, H&H 5.1/16.9, she was given 3 PRBCs. Repeat H&H 10/30.4. Dr. Chapa recommended CT Abd / Pelvis and GI Evaluation and admission for close monitoring for 48 hours.     Acute on Chronic Anemia due to GAVE  - She is also on Enhertu, which can cause anemia  - s/p 4 PRBCs, last done 8/3  - Gets Venofer and Aranesp as an outpatient  - GI following, s/p EGD w/ 2 oozing erosions s/p cauterization  - monitor hgb trend  - Hem/Onc following   - protonix BID     Metastatic Breast Cancer   - On Enhertu (last dose 6/19)  - Hem / Onc following  - Outpatient follow up with Rad / Onc  - Palliative recs appreciated  - pain management following and managing PCA pump  - c/w home pregabalin, methadone    Hx of elevated ammonia levels with encephalopathy  - c/w home lactulose    Hx of ILD  - pt on home O2 at baseline  - pt requesting pulm consult for optimization prior to discharge later     Thrombocytopenia  - Chronic and intermittent   - Likely due to Enhertu  - Monitor      Anxiety / Mood Disorder  - Continue Fluoxetine, Olanzapine and Diazepam (PRN)    DVT Prophylaxis -- Venodyne     Dispo: Home once stable. Pending pain control and stable H/H

## 2024-08-04 NOTE — PROGRESS NOTE ADULT - ASSESSMENT
38y woman presenting to the ER for recurrent anemia. Patient is familiar to the GI service, s/p recent EGD 7/2024 which showed resolution of GAVE but red spots in the antrum and the duodenum. Repeat EGD from 7/31/2024 showed normal esophagus and duodenum, and 2 erosion that were oozing in the antrum with small erythematous spots. MRI abdomen performed to look for liver metastases and ascites.    #Anemia  #Rectal bleeding, now resolved  #Small ascites  S/p 3 PRBCs  S/p EGD and cauterization of oozing erosions in the antrum  Reports episode of rectal bleeding on 8/1  No evidence of liver mets on MRI  Colonoscopy 11/2023 with Dr Rosado - large internal hemorrhoids, otherwise normal  - Trend Hgb  - Continue protonix 40mg BID  - Start anusol suppositories prn

## 2024-08-04 NOTE — PROGRESS NOTE ADULT - SUBJECTIVE AND OBJECTIVE BOX
[facility] Division of Medicine    SUBJECTIVE / OVERNIGHT EVENTS: No overnight events as per RN. Pt seen at the bedside. Endorses feeling better. Denies any new complaints. All other systems reviewed and are negative.    MEDICATIONS  (STANDING):  chlorhexidine 2% Cloths 1 Application(s) Topical <User Schedule>  FLUoxetine 80 milliGRAM(s) Oral at bedtime  heparin  Lock Flush 100 Units/mL Injectable 300 Unit(s) IV Push once  HYDROmorphone PCA (1 mG/mL) 30 milliLiter(s) PCA Continuous PCA Continuous  lactulose Syrup 20 Gram(s) Oral three times a day  memantine 10 milliGRAM(s) Oral two times a day  methadone    Tablet 15 milliGRAM(s) Oral at bedtime  methadone    Tablet 10 milliGRAM(s) Oral <User Schedule>  methylphenidate 20 milliGRAM(s) Oral <User Schedule>  octreotide  Injectable 100 MICROGram(s) SubCutaneous two times a day  OLANZapine 2.5 milliGRAM(s) Oral at bedtime  pantoprazole    Tablet 40 milliGRAM(s) Oral two times a day  pregabalin 200 milliGRAM(s) Oral three times a day  sodium chloride 0.9%. 1000 milliLiter(s) (75 mL/Hr) IV Continuous <Continuous>    MEDICATIONS  (PRN):  acetaminophen     Tablet .. 650 milliGRAM(s) Oral every 6 hours PRN Temp greater or equal to 38C (100.4F), Mild Pain (1 - 3)  diazepam    Tablet 2 milliGRAM(s) Oral two times a day PRN for anxiety and/or muscle spasm  ondansetron Injectable 4 milliGRAM(s) IV Push every 6 hours PRN Nausea and/or Vomiting      I&O's Summary      acetaminophen     Tablet .. 650 milliGRAM(s) Oral every 6 hours PRN  chlorhexidine 2% Cloths 1 Application(s) Topical <User Schedule>  diazepam    Tablet 2 milliGRAM(s) Oral two times a day PRN  FLUoxetine 80 milliGRAM(s) Oral at bedtime  heparin  Lock Flush 100 Units/mL Injectable 300 Unit(s) IV Push once  HYDROmorphone PCA (1 mG/mL) 30 milliLiter(s) PCA Continuous PCA Continuous  lactulose Syrup 20 Gram(s) Oral three times a day  memantine 10 milliGRAM(s) Oral two times a day  methadone    Tablet 10 milliGRAM(s) Oral <User Schedule>  methadone    Tablet 15 milliGRAM(s) Oral at bedtime  methylphenidate 20 milliGRAM(s) Oral <User Schedule>  octreotide  Injectable 100 MICROGram(s) SubCutaneous two times a day  OLANZapine 2.5 milliGRAM(s) Oral at bedtime  ondansetron Injectable 4 milliGRAM(s) IV Push every 6 hours PRN  pantoprazole    Tablet 40 milliGRAM(s) Oral two times a day  pregabalin 200 milliGRAM(s) Oral three times a day  sodium chloride 0.9%. 1000 milliLiter(s) IV Continuous <Continuous>      PHYSICAL EXAM:  Vital Signs Last 24 Hrs  T(C): 36.4 (04 Aug 2024 12:06), Max: 37.3 (03 Aug 2024 16:30)  T(F): 97.6 (04 Aug 2024 12:06), Max: 99.1 (03 Aug 2024 16:30)  HR: 72 (04 Aug 2024 12:06) (72 - 100)  BP: 101/71 (04 Aug 2024 12:06) (101/70 - 114/76)  BP(mean): --  RR: 18 (04 Aug 2024 12:06) (16 - 18)  SpO2: 100% (04 Aug 2024 12:06) (94% - 100%)    Parameters below as of 04 Aug 2024 12:06  Patient On (Oxygen Delivery Method): room air          CONSTITUTIONAL: no apparent distress  RESP: No respiratory distress, clear to auscultation bilaterally, no wheezes or rales  CV: RRR, +S1S2, no peripheral edema  GI: Soft, NT, ND, no rebound, no guarding  PSYCH: A+O x 3  NEURO: Cooperative, upper and lower motor function grossly intact bilaterally, sensation grossly intact throughout      LABS:                        8.5    3.76  )-----------( 137      ( 04 Aug 2024 06:15 )             27.4     08-04    140  |  106  |  8.4  ----------------------------<  128<H>  3.7   |  23.0  |  0.42<L>    Ca    7.4<L>      04 Aug 2024 06:15    TPro  4.6<L>  /  Alb  2.5<L>  /  TBili  0.6  /  DBili  x   /  AST  46<H>  /  ALT  22  /  AlkPhos  229<H>  08-04          Urinalysis Basic - ( 04 Aug 2024 06:15 )    Color: x / Appearance: x / SG: x / pH: x  Gluc: 128 mg/dL / Ketone: x  / Bili: x / Urobili: x   Blood: x / Protein: x / Nitrite: x   Leuk Esterase: x / RBC: x / WBC x   Sq Epi: x / Non Sq Epi: x / Bacteria: x        CAPILLARY BLOOD GLUCOSE          IMAGING:

## 2024-08-04 NOTE — PROGRESS NOTE ADULT - SUBJECTIVE AND OBJECTIVE BOX
Chief Complaint:  Patient is a 38y old  Female who presents with a chief complaint of Low blood count (04 Aug 2024 13:15)      HPI/ 24 hr events: Patient seen and examined at bedside. She is tolerating regular diet. She had small amount of rectal bleeding with clots 3 days ago. No bleeding since. Her last colonoscopy was in 11/2023, showed large internal hemorrhoids, otherwise normal. She reports some mild abdominal discomfort. No nausea, vomiting, diarrhea, constipation.      REVIEW OF SYSTEMS:   General: Negative  HEENT: Negative  CV: Negative  Respiratory: Negative  GI: See HPI  : Negative  MSK: Negative  Hematologic: Negative  Skin: Negative    MEDICATIONS:   MEDICATIONS  (STANDING):  chlorhexidine 2% Cloths 1 Application(s) Topical <User Schedule>  FLUoxetine 80 milliGRAM(s) Oral at bedtime  heparin  Lock Flush 100 Units/mL Injectable 300 Unit(s) IV Push once  HYDROmorphone PCA (1 mG/mL) 30 milliLiter(s) PCA Continuous PCA Continuous  lactulose Syrup 20 Gram(s) Oral three times a day  memantine 10 milliGRAM(s) Oral two times a day  methadone    Tablet 10 milliGRAM(s) Oral <User Schedule>  methadone    Tablet 15 milliGRAM(s) Oral at bedtime  methylphenidate 20 milliGRAM(s) Oral <User Schedule>  octreotide  Injectable 100 MICROGram(s) SubCutaneous two times a day  OLANZapine 2.5 milliGRAM(s) Oral at bedtime  pantoprazole    Tablet 40 milliGRAM(s) Oral two times a day  pregabalin 200 milliGRAM(s) Oral three times a day  sodium chloride 0.9%. 1000 milliLiter(s) (75 mL/Hr) IV Continuous <Continuous>    MEDICATIONS  (PRN):  acetaminophen     Tablet .. 650 milliGRAM(s) Oral every 6 hours PRN Temp greater or equal to 38C (100.4F), Mild Pain (1 - 3)  diazepam    Tablet 2 milliGRAM(s) Oral two times a day PRN for anxiety and/or muscle spasm  ondansetron Injectable 4 milliGRAM(s) IV Push every 6 hours PRN Nausea and/or Vomiting      Packed Red Cells Order:  1 Unit  Indication: Hgb <8 gm/dL -Stable Cardiovascular Disease or Acute Co  Infuse Unit : 3 Hours (08-03-24 @ 11:59)  Packed Red Cells Order:  1 Unit  Indication: Hgb <7 gm/dL  Infuse Unit : 3.5 Hours (07-29-24 @ 20:24)  Packed Red Cells Order:  1 Unit  Indication: Hgb <7 gm/dL  Infuse Unit : 3 Hours (07-29-24 @ 15:11)  Packed Red Cells Order:  1 Unit  Indication: Hgb <7 gm/dL  Infuse Unit : 3 Hours (07-29-24 @ 15:11)  Packed Red Cells Order:  1 Unit  Indication: Hgb <7 gm/dL  Infuse Unit : 3.5 Hours (07-29-24 @ 15:05)  Packed Red Cells Order:  1 Unit  Indication: Hgb <7 gm/dL  Infuse Unit : 3.5 Hours (07-29-24 @ 15:05)        DIET:  Diet, Regular (08-02-24 @ 12:33) [Active]          ALLERGIES:   Allergies    pertuzumab (Other (Severe))  Perjeta (Other (Severe))    Intolerances        VITAL SIGNS:   Vital Signs Last 24 Hrs  T(C): 36.4 (04 Aug 2024 12:06), Max: 37.3 (03 Aug 2024 16:30)  T(F): 97.6 (04 Aug 2024 12:06), Max: 99.1 (03 Aug 2024 16:30)  HR: 72 (04 Aug 2024 12:06) (72 - 100)  BP: 101/71 (04 Aug 2024 12:06) (101/70 - 114/76)  BP(mean): --  RR: 18 (04 Aug 2024 12:06) (16 - 18)  SpO2: 100% (04 Aug 2024 12:06) (94% - 100%)    Parameters below as of 04 Aug 2024 12:06  Patient On (Oxygen Delivery Method): room air      I&O's Summary      PHYSICAL EXAM:   GENERAL:  No acute distress  HEENT:  NC/AT, conjunctiva clear, sclera anicteric  CHEST:  No increased effort  HEART:  Regular rate  ABDOMEN:  Soft, non-tender, non-distended, no rebound or guarding  EXTREMITIES: No edema  SKIN:  Warm, dry  NEURO:  Calm, cooperative    LABS:                        8.5    3.76  )-----------( 137      ( 04 Aug 2024 06:15 )             27.4     Hemoglobin: 8.5 g/dL (08-04-24 @ 06:15)  Hemoglobin: 7.1 g/dL (08-03-24 @ 06:15)  Hemoglobin: 8.5 g/dL (08-02-24 @ 06:26)  Hemoglobin: 9.2 g/dL (08-01-24 @ 21:45)    08-04    140  |  106  |  8.4  ----------------------------<  128<H>  3.7   |  23.0  |  0.42<L>    Ca    7.4<L>      04 Aug 2024 06:15    TPro  4.6<L>  /  Alb  2.5<L>  /  TBili  0.6  /  DBili  x   /  AST  46<H>  /  ALT  22  /  AlkPhos  229<H>  08-04    LIVER FUNCTIONS - ( 04 Aug 2024 06:15 )  Alb: 2.5 g/dL / Pro: 4.6 g/dL / ALK PHOS: 229 U/L / ALT: 22 U/L / AST: 46 U/L / GGT: x                                                     RADIOLOGY & ADDITIONAL STUDIES:

## 2024-08-05 LAB
HCT VFR BLD CALC: 28.7 % — LOW (ref 34.5–45)
HGB BLD-MCNC: 8.9 G/DL — LOW (ref 11.5–15.5)
MCHC RBC-ENTMCNC: 29.9 PG — SIGNIFICANT CHANGE UP (ref 27–34)
MCHC RBC-ENTMCNC: 31 GM/DL — LOW (ref 32–36)
MCV RBC AUTO: 96.3 FL — SIGNIFICANT CHANGE UP (ref 80–100)
PLATELET # BLD AUTO: 125 K/UL — LOW (ref 150–400)
RBC # BLD: 2.98 M/UL — LOW (ref 3.8–5.2)
RBC # FLD: 19.2 % — HIGH (ref 10.3–14.5)
WBC # BLD: 2.9 K/UL — LOW (ref 3.8–10.5)
WBC # FLD AUTO: 2.9 K/UL — LOW (ref 3.8–10.5)

## 2024-08-05 PROCEDURE — 99232 SBSQ HOSP IP/OBS MODERATE 35: CPT

## 2024-08-05 PROCEDURE — 71275 CT ANGIOGRAPHY CHEST: CPT | Mod: 26

## 2024-08-05 RX ORDER — IPRATROPIUM BROMIDE AND ALBUTEROL SULFATE 2.5; .5 MG/3ML; MG/3ML
3 SOLUTION RESPIRATORY (INHALATION) EVERY 6 HOURS
Refills: 0 | Status: DISCONTINUED | OUTPATIENT
Start: 2024-08-05 | End: 2024-08-07

## 2024-08-05 RX ORDER — HYDROCORTISONE 1 %
1 CREAM (GRAM) TOPICAL THREE TIMES A DAY
Refills: 0 | Status: DISCONTINUED | OUTPATIENT
Start: 2024-08-05 | End: 2024-08-07

## 2024-08-05 RX ORDER — LIDOCAINE 5% 5 G/100G
2 CREAM TOPICAL EVERY 24 HOURS
Refills: 0 | Status: DISCONTINUED | OUTPATIENT
Start: 2024-08-05 | End: 2024-08-07

## 2024-08-05 RX ADMIN — Medication 30 MILLILITER(S): at 19:06

## 2024-08-05 RX ADMIN — Medication 75 MILLILITER(S): at 07:51

## 2024-08-05 RX ADMIN — CHLORHEXIDINE GLUCONATE 1 APPLICATION(S): 500 CLOTH TOPICAL at 06:25

## 2024-08-05 RX ADMIN — Medication 200 MILLIGRAM(S): at 06:11

## 2024-08-05 RX ADMIN — PANTOPRAZOLE SODIUM 40 MILLIGRAM(S): 20 TABLET, DELAYED RELEASE ORAL at 19:06

## 2024-08-05 RX ADMIN — METHYLPHENIDATE HYDROCHLORIDE 20 MILLIGRAM(S): 18 TABLET, EXTENDED RELEASE ORAL at 12:01

## 2024-08-05 RX ADMIN — Medication 20 GRAM(S): at 15:31

## 2024-08-05 RX ADMIN — MEMANTINE HYDROCHLORIDE 10 MILLIGRAM(S): 14 CAPSULE, EXTENDED RELEASE ORAL at 06:11

## 2024-08-05 RX ADMIN — LIDOCAINE 5% 2 PATCH: 5 CREAM TOPICAL at 15:30

## 2024-08-05 RX ADMIN — Medication 10 MILLIGRAM(S): at 15:30

## 2024-08-05 RX ADMIN — MEMANTINE HYDROCHLORIDE 10 MILLIGRAM(S): 14 CAPSULE, EXTENDED RELEASE ORAL at 19:06

## 2024-08-05 RX ADMIN — OCTREOTIDE ACETATE 100 MICROGRAM(S): 200 INJECTION INTRAVENOUS at 06:12

## 2024-08-05 RX ADMIN — Medication 30 MILLILITER(S): at 07:49

## 2024-08-05 RX ADMIN — PANTOPRAZOLE SODIUM 40 MILLIGRAM(S): 20 TABLET, DELAYED RELEASE ORAL at 06:11

## 2024-08-05 RX ADMIN — Medication 200 MILLIGRAM(S): at 15:30

## 2024-08-05 RX ADMIN — METHYLPHENIDATE HYDROCHLORIDE 20 MILLIGRAM(S): 18 TABLET, EXTENDED RELEASE ORAL at 06:26

## 2024-08-05 RX ADMIN — BUDESONIDE AND FORMOTEROL FUMARATE DIHYDRATE 2 PUFF(S): 80; 4.5 AEROSOL RESPIRATORY (INHALATION) at 09:00

## 2024-08-05 NOTE — PROGRESS NOTE ADULT - ASSESSMENT
39 y/o female with hx of Stage-4 Breast cancer with diffuse osseous mets, on home methadone for chronic pain   dPCA 0/0.3/8/6  cont home methadone 10/10//15    start lidocaine   Patch 12 hours on, 12 hours off. Max 3 patches on at one time     when due for discharge:  DC PCA  - Recommend starting dilaudid PO 4mg/8mg [id number] PRN mod/severe pain

## 2024-08-05 NOTE — PROGRESS NOTE ADULT - SUBJECTIVE AND OBJECTIVE BOX
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 On Octerotide LD 7/18 and iv iron LD weekly LD 7/18 including prior w/u with GI (noted to have telangiectasias on EGD / GAVE) , Sent form the office for a 10 lb weight gain anf hgb of 5.7 Placed in obs Hgb 5.1 S/P 3 U PRNC hgb now 10    PAST MEDICAL & SURGICAL HISTORY:  H/O compression fracture of spine  Anxiety  Metastatic breast cancer  H/O pleural effusion  Pericardial effusion  S/P tonsillectomy  H/O chest tube placement  12/23/21  S/P pericardiocentesis  12/28/21    Allergies  pertuzumab (Other (Severe))  Perjeta (Other (Severe))    MEDICATIONS  (STANDING):  chlorhexidine 2% Cloths 1 Application(s) Topical <User Schedule>  FLUoxetine 80 milliGRAM(s) Oral at bedtime  heparin  Lock Flush 100 Units/mL Injectable 300 Unit(s) IV Push once  HYDROmorphone PCA (1 mG/mL) 30 milliLiter(s) PCA Continuous PCA Continuous  lactulose Syrup 20 Gram(s) Oral three times a day  memantine 10 milliGRAM(s) Oral two times a day  methadone    Tablet 10 milliGRAM(s) Oral <User Schedule>  methadone    Tablet 15 milliGRAM(s) Oral at bedtime  methylphenidate 20 milliGRAM(s) Oral <User Schedule>  octreotide  Injectable 100 MICROGram(s) SubCutaneous two times a day  OLANZapine 2.5 milliGRAM(s) Oral at bedtime  pantoprazole    Tablet 40 milliGRAM(s) Oral two times a day  pregabalin 200 milliGRAM(s) Oral three times a day  sodium chloride 0.9%. 1000 milliLiter(s) (75 mL/Hr) IV Continuous <Continuous>    MEDICATIONS  (PRN):  acetaminophen     Tablet .. 650 milliGRAM(s) Oral every 6 hours PRN Temp greater or equal to 38C (100.4F), Mild Pain (1 - 3)  diazepam    Tablet 2 milliGRAM(s) Oral two times a day PRN for anxiety and/or muscle spasm  ondansetron Injectable 4 milliGRAM(s) IV Push every 6 hours PRN Nausea and/or Vomiting      Vital Signs Last 24 Hrs  T(C): 36.3 (05 Aug 2024 04:50), Max: 36.7 (04 Aug 2024 21:10)  T(F): 97.4 (05 Aug 2024 04:50), Max: 98 (04 Aug 2024 21:10)  HR: 69 (05 Aug 2024 04:50) (69 - 87)  BP: 102/65 (05 Aug 2024 04:50) (101/71 - 110/75)  BP(mean): --  RR: 18 (05 Aug 2024 04:50) (18 - 18)  SpO2: 93% (05 Aug 2024 04:50) (93% - 100%)    Parameters below as of 05 Aug 2024 04:50  Patient On (Oxygen Delivery Method): room air    PHYSICAL EXAM:   Gen: NAD  Head: NC/AT  Neck: supple  Card: regular rate and rhythm. Mild ankle edema bilaterally.  Resp:  CTAB  Abd: soft, non-distended   Ext: DENNEY well  Neuro:  A&O,   Skin:  Warm and dry   Psych:  Normal affect and mood       CBC                             8.9    2.90  )-----------( 125      ( 05 Aug 2024 04:54 )             28.7                           8.5    3.76  )-----------( 137      ( 04 Aug 2024 06:15 )             27.4                           7.1    3.85  )-----------( 114      ( 03 Aug 2024 06:15 )             24.1                                      8.5    2.14  )-----------( 124      ( 02 Aug 2024 06:26 )             28.1                9.7    2.35  )-----------( 133      ( 01 Aug 2024 05:39 )             31.7                           9.4    3.29  )-----------( 144      ( 31 Jul 2024 05:05 )             29.2                10.0   3.50  )-----------( 132      ( 30 Jul 2024 07:53 )             30.4   CHEM    08-04    140  |  106  |  8.4  ----------------------------<  128<H>  3.7   |  23.0  |  0.42<L>    Ca    7.4<L>      04 Aug 2024 06:15    TPro  4.6<L>  /  Alb  2.5<L>  /  TBili  0.6  /  DBili  x   /  AST  46<H>  /  ALT  22  /  AlkPhos  229<H>  08-04 08-04    140  |  106  |  8.4  ----------------------------<  128<H>  3.7   |  23.0  |  0.42<L>    Ca    7.4<L>      04 Aug 2024 06:15    TPro  4.6<L>  /  Alb  2.5<L>  /  TBili  0.6  /  DBili  x   /  AST  46<H>  /  ALT  22  /  AlkPhos  229<H>  08-04 08-03    142  |  110<H>  |  9.1  ----------------------------<  104<H>  4.1   |  24.0  |  0.59    Ca    7.5<L>      03 Aug 2024 06:15    TPro  4.3<L>  /  Alb  2.6<L>  /  TBili  0.4  /  DBili  x   /  AST  47<H>  /  ALT  24  /  AlkPhos  241<H>  08-03 08-02    143  |  109<H>  |  6.9<L>  ----------------------------<  93  4.0   |  26.0  |  0.47<L>    Ca    8.2<L>      02 Aug 2024 06:26    TPro  4.6<L>  /  Alb  2.5<L>  /  TBili  0.5  /  DBili  x   /  AST  43<H>  /  ALT  24  /  AlkPhos  199<H>  08-02 08-01    139  |  108  |  6.2<L>  ----------------------------<  70  4.8   |  22.0  |  0.30<L>    Ca    8.2<L>      01 Aug 2024 05:39  Mg     1.9     07-31    TPro  5.2<L>  /  Alb  2.8<L>  /  TBili  1.1  /  DBili  x   /  AST  62<H>  /  ALT  31  /  AlkPhos  226<H>  08-01 07-31    140  |  106  |  6.6<L>  ----------------------------<  86  4.0   |  27.0  |  0.48<L>    Ca    8.2<L>      31 Jul 2024 05:05  Mg     1.9     07-31    TPro  4.7<L>  /  Alb  2.7<L>  /  TBili  1.0  /  DBili  x   /  AST  81<H>  /  ALT  38<H>  /  AlkPhos  251<H>  07-31 07-29    139  |  108  |  8.8  ----------------------------<  116<H>  3.8   |  26.0  |  0.36<L>    Ca    7.2<L>      29 Jul 2024 14:10    TPro  4.1<L>  /  Alb  2.4<L>  /  TBili  0.4  /  DBili  x   /  AST  58<H>  /  ALT  28  /  AlkPhos  184<H>  07-29

## 2024-08-05 NOTE — PROGRESS NOTE ADULT - ASSESSMENT
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 previously on O2 now weaned off, hx of pericardial effusion, hx of GAVE requiring recurrent transfusions presented to the ED 7/30 due to anemia s/p 3u pRBC with EGD 7/31 with 2 oozing erosions in the antrum now with complaint of dyspnea     Dyspnea in the setting of chronic lung infiltrates/lymphangitic disease   given reported worsening dyspnea/chest discomfort, will obtain repeat CT chest angio for PE rule out and reassess parenchyma   placed on nasal cannula today for possible desaturation but sating 100% when seen; wean off supplemental O2 as tolerated for goal >90-92%  afebrile with no overt evidence of acute infectious process   c/w Symbicort; rinse mouth after use   Duoneb prn   pt requests nebulizer script on discharge   incentive spirometry   OOB/ambulate as tolerated   DVT ppx

## 2024-08-05 NOTE — PROGRESS NOTE ADULT - SUBJECTIVE AND OBJECTIVE BOX
[facility] Division of Medicine    SUBJECTIVE / OVERNIGHT EVENTS: No overnight events as per RN. Pt seen at the bedside. Endorses feeling better today. Denies any new complaints. All other systems reviewed and are negative.    MEDICATIONS  (STANDING):  budesonide 160 MICROgram(s)/formoterol 4.5 MICROgram(s) Inhaler 2 Puff(s) Inhalation two times a day  chlorhexidine 2% Cloths 1 Application(s) Topical <User Schedule>  FLUoxetine 80 milliGRAM(s) Oral at bedtime  heparin  Lock Flush 100 Units/mL Injectable 300 Unit(s) IV Push once  HYDROmorphone PCA (1 mG/mL) 30 milliLiter(s) PCA Continuous PCA Continuous  lactulose Syrup 20 Gram(s) Oral three times a day  memantine 10 milliGRAM(s) Oral two times a day  methadone    Tablet 10 milliGRAM(s) Oral <User Schedule>  methadone    Tablet 15 milliGRAM(s) Oral at bedtime  methylphenidate 20 milliGRAM(s) Oral <User Schedule>  octreotide  Injectable 100 MICROGram(s) SubCutaneous two times a day  OLANZapine 2.5 milliGRAM(s) Oral at bedtime  pantoprazole    Tablet 40 milliGRAM(s) Oral two times a day  pregabalin 200 milliGRAM(s) Oral three times a day    MEDICATIONS  (PRN):  acetaminophen     Tablet .. 650 milliGRAM(s) Oral every 6 hours PRN Temp greater or equal to 38C (100.4F), Mild Pain (1 - 3)  diazepam    Tablet 2 milliGRAM(s) Oral two times a day PRN for anxiety and/or muscle spasm  hydrocortisone hemorrhoidal Suppository 1 Suppository(s) Rectal three times a day PRN consitpation or rectal pain  ondansetron Injectable 4 milliGRAM(s) IV Push every 6 hours PRN Nausea and/or Vomiting      I&O's Summary      acetaminophen     Tablet .. 650 milliGRAM(s) Oral every 6 hours PRN  budesonide 160 MICROgram(s)/formoterol 4.5 MICROgram(s) Inhaler 2 Puff(s) Inhalation two times a day  chlorhexidine 2% Cloths 1 Application(s) Topical <User Schedule>  diazepam    Tablet 2 milliGRAM(s) Oral two times a day PRN  FLUoxetine 80 milliGRAM(s) Oral at bedtime  heparin  Lock Flush 100 Units/mL Injectable 300 Unit(s) IV Push once  hydrocortisone hemorrhoidal Suppository 1 Suppository(s) Rectal three times a day PRN  HYDROmorphone PCA (1 mG/mL) 30 milliLiter(s) PCA Continuous PCA Continuous  lactulose Syrup 20 Gram(s) Oral three times a day  memantine 10 milliGRAM(s) Oral two times a day  methadone    Tablet 10 milliGRAM(s) Oral <User Schedule>  methadone    Tablet 15 milliGRAM(s) Oral at bedtime  methylphenidate 20 milliGRAM(s) Oral <User Schedule>  octreotide  Injectable 100 MICROGram(s) SubCutaneous two times a day  OLANZapine 2.5 milliGRAM(s) Oral at bedtime  ondansetron Injectable 4 milliGRAM(s) IV Push every 6 hours PRN  pantoprazole    Tablet 40 milliGRAM(s) Oral two times a day  pregabalin 200 milliGRAM(s) Oral three times a day      PHYSICAL EXAM:  Vital Signs Last 24 Hrs  T(C): 36.6 (05 Aug 2024 08:00), Max: 36.7 (04 Aug 2024 21:10)  T(F): 97.8 (05 Aug 2024 08:00), Max: 98 (04 Aug 2024 21:10)  HR: 77 (05 Aug 2024 08:00) (69 - 87)  BP: 103/71 (05 Aug 2024 08:00) (102/65 - 110/75)  BP(mean): --  RR: 18 (05 Aug 2024 08:00) (18 - 18)  SpO2: 98% (05 Aug 2024 08:00) (93% - 100%)    Parameters below as of 05 Aug 2024 08:00  Patient On (Oxygen Delivery Method): room air      CONSTITUTIONAL: no apparent distress  RESP: No respiratory distress, clear to auscultation bilaterally, no wheezes or rales  CV: RRR, +S1S2, no peripheral edema  GI: Soft, NT, ND  PSYCH: A+O x 3  NEURO: Cooperative, upper and lower motor function grossly intact bilaterally, sensation grossly intact throughout      LABS:                        8.9    2.90  )-----------( 125      ( 05 Aug 2024 04:54 )             28.7     08-04    140  |  106  |  8.4  ----------------------------<  128<H>  3.7   |  23.0  |  0.42<L>    Ca    7.4<L>      04 Aug 2024 06:15    TPro  4.6<L>  /  Alb  2.5<L>  /  TBili  0.6  /  DBili  x   /  AST  46<H>  /  ALT  22  /  AlkPhos  229<H>  08-04          Urinalysis Basic - ( 04 Aug 2024 06:15 )    Color: x / Appearance: x / SG: x / pH: x  Gluc: 128 mg/dL / Ketone: x  / Bili: x / Urobili: x   Blood: x / Protein: x / Nitrite: x   Leuk Esterase: x / RBC: x / WBC x   Sq Epi: x / Non Sq Epi: x / Bacteria: x        CAPILLARY BLOOD GLUCOSE          IMAGING:

## 2024-08-05 NOTE — PROGRESS NOTE ADULT - ASSESSMENT
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 Decrease in size of multiple enhancing infra and supratentorial lesions. On surveillance.  - Last PET on 4/3/24 showed Asymmetric foci of hypermetabolism in the right cerebellum corresponding to lesions evident on concurrent MRI.Very mild non significant hypermetabolism associated with some of multiple mixed lytic/sclerotic lesions throughout skeleton.  No significant hypermetabolic foci to suggest metastatic activity   - CT A/P  showed mild layering left pleural effusion. Pt complaining of increasing SOB and CP- recommend CXR    Will arrange follow up with Rad/Onc as an outpt after discharge     Anemia due to GAVE  - Hgb 5.1 on admission  - S/P 3 U PRBC went to 8.5  - HB 7.1 s/p 1UPRBC today HB 8.9  - Receives iv iron TIW Aranesp increased to 500mcg from 300 mcg Q 3 weeks LD 7/11  - Octreotide was due yesterday - Continue Octreotide 100mcg BID while admitted  - GI following S/P endoscopy-Normal esophagus. Normal duodenum. Friability. There were 2 erosions which were oozing. There were small erythematous spots in the antrum as well. In the antrum. cauterization of oozing erosions in the antrum  -CT Abd / Pelvis  Mild ascites. Splenomegaly. LIVER: Within normal limits. Hepatic veins and portal vein are patent.    - Ammonia level 81   - Ammonia concentration increases in red cell units (RBCs) during storage and pt has been getting frequent transfusions?   - Enhurtu can also raise LFT's  - ferritin 349 ALK Phos  213 ( known bone mets ) AST 77 ALT 37 Drawn in the office on 7/29   - 8/1/2024 MRI ABD w and W/O contrast to evaluate liver Shows No evidence of liver metastases.Small amount of ascites.    CBC is stable   WBC 2.9 Hgb 8.9 plt 125k     Will follow

## 2024-08-05 NOTE — PROGRESS NOTE ADULT - SUBJECTIVE AND OBJECTIVE BOX
Interval Hx:  Patient seen during rounds  Patient reports pain to be controlled on current medications  Patient denies sedation with medications     Analgesic Dosing for past 24 hours reviewed as below:    diazepam    Tablet   2 milliGRAM(s) Oral (08-04-24 @ 21:17)    FLUoxetine   80 milliGRAM(s) Oral (08-04-24 @ 21:16)    memantine   10 milliGRAM(s) Oral (08-05-24 @ 06:11)   10 milliGRAM(s) Oral (08-04-24 @ 18:55)    methadone    Tablet   15 milliGRAM(s) Oral (08-04-24 @ 21:16)    methylphenidate   20 milliGRAM(s) Oral (08-05-24 @ 12:01)   20 milliGRAM(s) Oral (08-05-24 @ 06:26)    OLANZapine   2.5 milliGRAM(s) Oral (08-04-24 @ 21:17)    pregabalin   200 milliGRAM(s) Oral (08-05-24 @ 06:11)   200 milliGRAM(s) Oral (08-04-24 @ 21:15)          T(C): 37 (08-05-24 @ 14:23), Max: 37 (08-05-24 @ 14:23)  HR: 91 (08-05-24 @ 14:23) (69 - 91)  BP: 112/75 (08-05-24 @ 14:23) (102/65 - 147/84)  RR: 18 (08-05-24 @ 14:23) (18 - 18)  SpO2: 96% (08-05-24 @ 14:23) (93% - 100%)        acetaminophen     Tablet .. 650 milliGRAM(s) Oral every 6 hours PRN  budesonide 160 MICROgram(s)/formoterol 4.5 MICROgram(s) Inhaler 2 Puff(s) Inhalation two times a day  chlorhexidine 2% Cloths 1 Application(s) Topical <User Schedule>  diazepam    Tablet 2 milliGRAM(s) Oral two times a day PRN  FLUoxetine 80 milliGRAM(s) Oral at bedtime  heparin  Lock Flush 100 Units/mL Injectable 300 Unit(s) IV Push once  hydrocortisone hemorrhoidal Suppository 1 Suppository(s) Rectal three times a day PRN  HYDROmorphone PCA (1 mG/mL) 30 milliLiter(s) PCA Continuous PCA Continuous  lactulose Syrup 20 Gram(s) Oral three times a day  lidocaine   4% Patch 2 Patch Transdermal every 24 hours  memantine 10 milliGRAM(s) Oral two times a day  methadone    Tablet 10 milliGRAM(s) Oral <User Schedule>  methadone    Tablet 15 milliGRAM(s) Oral at bedtime  methylphenidate 20 milliGRAM(s) Oral <User Schedule>  octreotide  Injectable 100 MICROGram(s) SubCutaneous two times a day  OLANZapine 2.5 milliGRAM(s) Oral at bedtime  ondansetron Injectable 4 milliGRAM(s) IV Push every 6 hours PRN  pantoprazole    Tablet 40 milliGRAM(s) Oral two times a day  pregabalin 200 milliGRAM(s) Oral three times a day                          8.9    2.90  )-----------( 125      ( 05 Aug 2024 04:54 )             28.7     08-04    140  |  106  |  8.4  ----------------------------<  128<H>  3.7   |  23.0  |  0.42<L>    Ca    7.4<L>      04 Aug 2024 06:15    TPro  4.6<L>  /  Alb  2.5<L>  /  TBili  0.6  /  DBili  x   /  AST  46<H>  /  ALT  22  /  AlkPhos  229<H>  08-04      Urinalysis Basic - ( 04 Aug 2024 06:15 )    Color: x / Appearance: x / SG: x / pH: x  Gluc: 128 mg/dL / Ketone: x  / Bili: x / Urobili: x   Blood: x / Protein: x / Nitrite: x   Leuk Esterase: x / RBC: x / WBC x   Sq Epi: x / Non Sq Epi: x / Bacteria: x        Pain Service   [phone]

## 2024-08-05 NOTE — PROGRESS NOTE ADULT - SUBJECTIVE AND OBJECTIVE BOX
Patient is a 38y old  Female who presents with a chief complaint of Low blood count (05 Aug 2024 14:52)      Interval hx:  Reports she has noticed some progressive dyspnea over the last 2 months. Notes some difficulty catching her breath during speech. Denies fevers/chills.   Notes some intermittent lateral chest discomfort that may randomly occur. Denies midline chest pain. Notes some relief with inhalers. Endorses some nausea and diarrhea. Notes persistent LE edema.     PAST MEDICAL & SURGICAL HISTORY:  H/O compression fracture of spine      Anxiety      Metastatic breast cancer      H/O pleural effusion      Pericardial effusion      S/P tonsillectomy      H/O chest tube placement  12/23/21      S/P pericardiocentesis  12/28/21            Medications:      budesonide 160 MICROgram(s)/formoterol 4.5 MICROgram(s) Inhaler 2 Puff(s) Inhalation two times a day    acetaminophen     Tablet .. 650 milliGRAM(s) Oral every 6 hours PRN  diazepam    Tablet 2 milliGRAM(s) Oral two times a day PRN  FLUoxetine 80 milliGRAM(s) Oral at bedtime  HYDROmorphone PCA (1 mG/mL) 30 milliLiter(s) PCA Continuous PCA Continuous  memantine 10 milliGRAM(s) Oral two times a day  methadone    Tablet 15 milliGRAM(s) Oral at bedtime  methadone    Tablet 10 milliGRAM(s) Oral <User Schedule>  methylphenidate 20 milliGRAM(s) Oral <User Schedule>  OLANZapine 2.5 milliGRAM(s) Oral at bedtime  ondansetron Injectable 4 milliGRAM(s) IV Push every 6 hours PRN  pregabalin 200 milliGRAM(s) Oral three times a day      heparin  Lock Flush 100 Units/mL Injectable 300 Unit(s) IV Push once    lactulose Syrup 20 Gram(s) Oral three times a day  pantoprazole    Tablet 40 milliGRAM(s) Oral two times a day      octreotide  Injectable 100 MICROGram(s) SubCutaneous two times a day        chlorhexidine 2% Cloths 1 Application(s) Topical <User Schedule>  hydrocortisone hemorrhoidal Suppository 1 Suppository(s) Rectal three times a day PRN  lidocaine   4% Patch 2 Patch Transdermal every 24 hours    Allergies    pertuzumab (Other (Severe))  Perjeta (Other (Severe))    Intolerances    Social: never smoker     FAMILY HISTORY:  FH: CVA (cerebrovascular accident)      ICU Vital Signs Last 24 Hrs  T(C): 36.9 (05 Aug 2024 21:17), Max: 37 (05 Aug 2024 14:23)  T(F): 98.4 (05 Aug 2024 21:17), Max: 98.6 (05 Aug 2024 14:23)  HR: 94 (05 Aug 2024 21:17) (69 - 94)  BP: 101/62 (05 Aug 2024 21:17) (101/62 - 147/84)  BP(mean): --  ABP: --  ABP(mean): --  RR: 18 (05 Aug 2024 21:17) (16 - 18)  SpO2: 97% (05 Aug 2024 21:17) (93% - 100%)    O2 Parameters below as of 05 Aug 2024 21:17  Patient On (Oxygen Delivery Method): room air      LABS:                        8.9    2.90  )-----------( 125      ( 05 Aug 2024 04:54 )             28.7     08-04    140  |  106  |  8.4  ----------------------------<  128<H>  3.7   |  23.0  |  0.42<L>    Ca    7.4<L>      04 Aug 2024 06:15    TPro  4.6<L>  /  Alb  2.5<L>  /  TBili  0.6  /  DBili  x   /  AST  46<H>  /  ALT  22  /  AlkPhos  229<H>  08-04          CAPILLARY BLOOD GLUCOSE          Urinalysis Basic - ( 04 Aug 2024 06:15 )    Color: x / Appearance: x / SG: x / pH: x  Gluc: 128 mg/dL / Ketone: x  / Bili: x / Urobili: x   Blood: x / Protein: x / Nitrite: x   Leuk Esterase: x / RBC: x / WBC x   Sq Epi: x / Non Sq Epi: x / Bacteria: x      CULTURES:      Physical Examination:    General: awake, alert, in NAD     HEENT: NC/AT, anicteric, MMM    PULM: Clear to auscultation bilaterally, no accessory muscle use     NECK: Supple, trachea midline    CVS: S1S22, RRR    ABD: Soft, nondistended, nontender, normoactive bowel sounds    EXT: +trace edema b/l le, nontender    SKIN: Warm and well perfused, no rashes noted    NEURO: Alert, oriented, interactive, nonfocal    ROS: negative except as per HPI     RADIOLOGY:   < from: CT Angio Chest PE Protocol w/ IV Cont (07.21.24 @ 17:35) >  PULMONARY EMBOLISM: Limited evaluation for segmental and subsegmental   pulmonary embolism. No main, left right, or lobar pulmonary embolism..    AIRWAYS AND LUNGS: The central tracheobronchial tree is patent.  Patchy   bilateral lung opacities obscure evaluation of underlying left upper lobe   consolidation and bronchiectasis.    MEDIASTINUM AND PLEURA: There are no enlarged mediastinal, hilar or   axillary lymph nodes. The visualized portion of the thyroid gland is   unremarkable. Small loculated left pleural effusion. There is no   pneumothorax.    HEART AND VESSELS: The heart is normal in size.   There are no   atherosclerotic calcifications of the aorta.  There is no pericardial   effusion.  Mediport with tip in right atrium    UPPER ABDOMEN: Images of the upper abdomen demonstrate ascites.    BONES AND SOFT TISSUES: Lytic and sclerotic bony lesions  The soft   tissues are unremarkable.      IMPRESSION:  Limited evaluation for segmental and subsegmental pulmonary embolism. No   main, left right, or lobar pulmonary embolism.    Patchy bilateral lung opacities which may be infectious or inflammatory   obscure evaluation of underlying not resolved left upper lobe   consolidation and bronchiectasis. Short-term follow-up chest CT   recommended.    < end of copied text >      < from: Xray Chest 1 View AP/PA. (08.02.24 @ 13:38) >  INTERPRETATION:  AP erect chest on August 2, 2024 at 1:12 PM. Patient has   abnormal laboratory values. There is history of metastatic breast cancer.    Heart size normal. Right-sided port again noted.    Masslike density left upper outer chest is again noted.    There are increasing interstitial markings of both sharon compared to July 21.    IMPRESSION: Persistent mass left upper lobe. Increasing interstitial   markings off both sharon.    < end of copied text >

## 2024-08-05 NOTE — PROGRESS NOTE ADULT - SUBJECTIVE AND OBJECTIVE BOX
Patient is a 38y old  Female who presents with a chief complaint of Low blood count (05 Aug 2024 14:50)      HPI:  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),  Pericardial Effusion, Spinal Compression Fractures and recent hospitalizations (last one 10 days ago, discharged on 7/25/24) sent by Dr. Chapa (Hem / Onc) for anemia. As per patient, after being discharged from the hospital -- she was doing OK however she was feeling bloated and very winded. Her stools are loose due to stool softeners and lately she noticed stool color is dark. She went for blood work and was found to have Hb of 5.7 so she was sent to [facility].  In ER, H&H 5.1/16.9, she was given 3 PRBCs. Repeat H&H 10/30.4. Dr. Chapa recommended CT Abd / Pelvis and GI Evaluation and admission for close monitoring for 48 hours.    (30 Jul 2024 12:01)      Interval History  Patient was seen at bedside, is pleased with current pain control. On PCA. Denies any drowsiness, fever, chills, nausea, vomiting, abd pain. Issues with BM or urination. Eating well, able to ambulate.     Analgesic Dosing for past 24 hours reviewed as below:    diazepam    Tablet   2 milliGRAM(s) Oral (08-04-24 @ 21:17)    FLUoxetine   80 milliGRAM(s) Oral (08-04-24 @ 21:16)    memantine   10 milliGRAM(s) Oral (08-05-24 @ 06:11)   10 milliGRAM(s) Oral (08-04-24 @ 18:55)    methadone    Tablet   15 milliGRAM(s) Oral (08-04-24 @ 21:16)    methylphenidate   20 milliGRAM(s) Oral (08-05-24 @ 12:01)   20 milliGRAM(s) Oral (08-05-24 @ 06:26)    OLANZapine   2.5 milliGRAM(s) Oral (08-04-24 @ 21:17)    pregabalin   200 milliGRAM(s) Oral (08-05-24 @ 06:11)   200 milliGRAM(s) Oral (08-04-24 @ 21:15)          T(C): 37 (08-05-24 @ 14:23), Max: 37 (08-05-24 @ 14:23)  HR: 91 (08-05-24 @ 14:23) (69 - 91)  BP: 112/75 (08-05-24 @ 14:23) (102/65 - 147/84)  RR: 18 (08-05-24 @ 14:23) (18 - 18)  SpO2: 96% (08-05-24 @ 14:23) (93% - 100%)        acetaminophen     Tablet .. 650 milliGRAM(s) Oral every 6 hours PRN  budesonide 160 MICROgram(s)/formoterol 4.5 MICROgram(s) Inhaler 2 Puff(s) Inhalation two times a day  chlorhexidine 2% Cloths 1 Application(s) Topical <User Schedule>  diazepam    Tablet 2 milliGRAM(s) Oral two times a day PRN  FLUoxetine 80 milliGRAM(s) Oral at bedtime  heparin  Lock Flush 100 Units/mL Injectable 300 Unit(s) IV Push once  hydrocortisone hemorrhoidal Suppository 1 Suppository(s) Rectal three times a day PRN  HYDROmorphone PCA (1 mG/mL) 30 milliLiter(s) PCA Continuous PCA Continuous  lactulose Syrup 20 Gram(s) Oral three times a day  lidocaine   4% Patch 2 Patch Transdermal every 24 hours  memantine 10 milliGRAM(s) Oral two times a day  methadone    Tablet 15 milliGRAM(s) Oral at bedtime  methadone    Tablet 10 milliGRAM(s) Oral <User Schedule>  methylphenidate 20 milliGRAM(s) Oral <User Schedule>  octreotide  Injectable 100 MICROGram(s) SubCutaneous two times a day  OLANZapine 2.5 milliGRAM(s) Oral at bedtime  ondansetron Injectable 4 milliGRAM(s) IV Push every 6 hours PRN  pantoprazole    Tablet 40 milliGRAM(s) Oral two times a day  pregabalin 200 milliGRAM(s) Oral three times a day                          8.9    2.90  )-----------( 125      ( 05 Aug 2024 04:54 )             28.7     08-04    140  |  106  |  8.4  ----------------------------<  128<H>  3.7   |  23.0  |  0.42<L>    Ca    7.4<L>      04 Aug 2024 06:15    TPro  4.6<L>  /  Alb  2.5<L>  /  TBili  0.6  /  DBili  x   /  AST  46<H>  /  ALT  22  /  AlkPhos  229<H>  08-04      Urinalysis Basic - ( 04 Aug 2024 06:15 )    Color: x / Appearance: x / SG: x / pH: x  Gluc: 128 mg/dL / Ketone: x  / Bili: x / Urobili: x   Blood: x / Protein: x / Nitrite: x   Leuk Esterase: x / RBC: x / WBC x   Sq Epi: x / Non Sq Epi: x / Bacteria: x        Pain Service   [phone]

## 2024-08-05 NOTE — PROGRESS NOTE ADULT - ASSESSMENT
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 Chapa (Hem / Onc) for anemia. As per patient, after being discharged from the hospital -- she was doing OK however she was feeling bloated and very winded. Her stools are loose due to stool softeners and lately she noticed stool color is dark. She went for blood work and was found to have Hb of 5.7 so she was sent to [facility].  In ER, H&H 5.1/16.9, she was given 3 PRBCs. Repeat H&H 10/30.4. Dr. Chapa recommended CT Abd / Pelvis and GI Evaluation and admission for close monitoring for 48 hours.     Acute on Chronic Anemia due to GAVE  - She is also on Enhertu, which can cause anemia  - s/p 4 PRBCs, last done 8/3  - Gets Venofer and Aranesp as an outpatient  - GI following, s/p EGD w/ 2 oozing erosions s/p cauterization  - hgb trend continues to be stable today  - Hem/Onc following   - protonix BID, octreotide BID     Metastatic Breast Cancer   - On Enhertu (last dose 6/19)  - Hem / Onc following  - Outpatient follow up with Rad / Onc  - Palliative recs appreciated  - pain management following and managing PCA pump  - c/w home pregabalin, methadone  - start lidocaine patches     Hx of elevated ammonia levels with encephalopathy  - c/w home lactulose    Hx of ILD  - pt on home O2 at baseline  - pulm following for optimization  - trial of Symbicort     Thrombocytopenia  - Chronic and intermittent   - Likely due to Enhertu  - Monitor      Anxiety / Mood Disorder  - Continue Fluoxetine, Olanzapine and Diazepam (PRN)    DVT Prophylaxis -- Venodyne     Dispo: Home once stable. Pending pain control and stable H/H

## 2024-08-06 LAB
AMMONIA BLD-MCNC: 100 UMOL/L — HIGH (ref 11–55)
BLD GP AB SCN SERPL QL: SIGNIFICANT CHANGE UP
HCT VFR BLD CALC: 24.3 % — LOW (ref 34.5–45)
HCT VFR BLD CALC: 31.6 % — LOW (ref 34.5–45)
HGB BLD-MCNC: 7.4 G/DL — LOW (ref 11.5–15.5)
HGB BLD-MCNC: 9.7 G/DL — LOW (ref 11.5–15.5)
MCHC RBC-ENTMCNC: 29.2 PG — SIGNIFICANT CHANGE UP (ref 27–34)
MCHC RBC-ENTMCNC: 29.8 PG — SIGNIFICANT CHANGE UP (ref 27–34)
MCHC RBC-ENTMCNC: 30.5 GM/DL — LOW (ref 32–36)
MCHC RBC-ENTMCNC: 30.7 GM/DL — LOW (ref 32–36)
MCV RBC AUTO: 96 FL — SIGNIFICANT CHANGE UP (ref 80–100)
MCV RBC AUTO: 97.2 FL — SIGNIFICANT CHANGE UP (ref 80–100)
PLATELET # BLD AUTO: 112 K/UL — LOW (ref 150–400)
PLATELET # BLD AUTO: 144 K/UL — LOW (ref 150–400)
RAPID RVP RESULT: SIGNIFICANT CHANGE UP
RBC # BLD: 2.53 M/UL — LOW (ref 3.8–5.2)
RBC # BLD: 3.25 M/UL — LOW (ref 3.8–5.2)
RBC # FLD: 18.7 % — HIGH (ref 10.3–14.5)
RBC # FLD: 18.7 % — HIGH (ref 10.3–14.5)
SARS-COV-2 RNA SPEC QL NAA+PROBE: SIGNIFICANT CHANGE UP
WBC # BLD: 2.43 K/UL — LOW (ref 3.8–10.5)
WBC # BLD: 2.84 K/UL — LOW (ref 3.8–10.5)
WBC # FLD AUTO: 2.43 K/UL — LOW (ref 3.8–10.5)
WBC # FLD AUTO: 2.84 K/UL — LOW (ref 3.8–10.5)

## 2024-08-06 PROCEDURE — 99232 SBSQ HOSP IP/OBS MODERATE 35: CPT

## 2024-08-06 RX ORDER — FUROSEMIDE 10 MG/ML
20 INJECTION, SOLUTION INTRAVENOUS DAILY
Refills: 0 | Status: DISCONTINUED | OUTPATIENT
Start: 2024-08-06 | End: 2024-08-07

## 2024-08-06 RX ADMIN — FUROSEMIDE 20 MILLIGRAM(S): 10 INJECTION, SOLUTION INTRAVENOUS at 19:24

## 2024-08-06 RX ADMIN — Medication 200 MILLIGRAM(S): at 00:16

## 2024-08-06 RX ADMIN — LIDOCAINE 5% 2 PATCH: 5 CREAM TOPICAL at 17:55

## 2024-08-06 RX ADMIN — BUDESONIDE AND FORMOTEROL FUMARATE DIHYDRATE 2 PUFF(S): 80; 4.5 AEROSOL RESPIRATORY (INHALATION) at 00:17

## 2024-08-06 RX ADMIN — PANTOPRAZOLE SODIUM 40 MILLIGRAM(S): 20 TABLET, DELAYED RELEASE ORAL at 05:59

## 2024-08-06 RX ADMIN — CHLORHEXIDINE GLUCONATE 1 APPLICATION(S): 500 CLOTH TOPICAL at 05:59

## 2024-08-06 RX ADMIN — PANTOPRAZOLE SODIUM 40 MILLIGRAM(S): 20 TABLET, DELAYED RELEASE ORAL at 17:56

## 2024-08-06 RX ADMIN — Medication 200 MILLIGRAM(S): at 13:25

## 2024-08-06 RX ADMIN — Medication 80 MILLIGRAM(S): at 21:10

## 2024-08-06 RX ADMIN — Medication 10 MILLIGRAM(S): at 13:25

## 2024-08-06 RX ADMIN — Medication 30 MILLILITER(S): at 20:50

## 2024-08-06 RX ADMIN — MEMANTINE HYDROCHLORIDE 10 MILLIGRAM(S): 14 CAPSULE, EXTENDED RELEASE ORAL at 17:56

## 2024-08-06 RX ADMIN — METHYLPHENIDATE HYDROCHLORIDE 20 MILLIGRAM(S): 18 TABLET, EXTENDED RELEASE ORAL at 05:59

## 2024-08-06 RX ADMIN — Medication 200 MILLIGRAM(S): at 05:59

## 2024-08-06 RX ADMIN — Medication 2 MILLIGRAM(S): at 22:21

## 2024-08-06 RX ADMIN — Medication 15 MILLIGRAM(S): at 00:17

## 2024-08-06 RX ADMIN — OCTREOTIDE ACETATE 100 MICROGRAM(S): 200 INJECTION INTRAVENOUS at 05:59

## 2024-08-06 RX ADMIN — Medication 15 MILLIGRAM(S): at 21:11

## 2024-08-06 RX ADMIN — OCTREOTIDE ACETATE 100 MICROGRAM(S): 200 INJECTION INTRAVENOUS at 17:55

## 2024-08-06 RX ADMIN — MEMANTINE HYDROCHLORIDE 10 MILLIGRAM(S): 14 CAPSULE, EXTENDED RELEASE ORAL at 05:59

## 2024-08-06 RX ADMIN — BUDESONIDE AND FORMOTEROL FUMARATE DIHYDRATE 2 PUFF(S): 80; 4.5 AEROSOL RESPIRATORY (INHALATION) at 21:12

## 2024-08-06 RX ADMIN — Medication 200 MILLIGRAM(S): at 21:11

## 2024-08-06 RX ADMIN — Medication 10 MILLIGRAM(S): at 10:06

## 2024-08-06 RX ADMIN — Medication 2.5 MILLIGRAM(S): at 21:11

## 2024-08-06 RX ADMIN — Medication 80 MILLIGRAM(S): at 00:19

## 2024-08-06 RX ADMIN — METHYLPHENIDATE HYDROCHLORIDE 20 MILLIGRAM(S): 18 TABLET, EXTENDED RELEASE ORAL at 13:25

## 2024-08-06 RX ADMIN — Medication 30 MILLILITER(S): at 19:25

## 2024-08-06 RX ADMIN — Medication 2.5 MILLIGRAM(S): at 00:21

## 2024-08-06 NOTE — PROGRESS NOTE ADULT - SUBJECTIVE AND OBJECTIVE BOX
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 On Octerotide LD 7/18 and iv iron LD weekly LD 7/18 including prior w/u with GI (noted to have telangiectasias on EGD / GAVE) , Sent form the office for a 10 lb weight gain anf hgb of 5.7 Placed in obs Hgb 5.1 S/P 3 U PRNC hgb now 10    PAST MEDICAL & SURGICAL HISTORY:  H/O compression fracture of spine  Anxiety  Metastatic breast cancer  H/O pleural effusion  Pericardial effusion  S/P tonsillectomy  H/O chest tube placement  12/23/21  S/P pericardiocentesis  12/28/21    Allergies  pertuzumab (Other (Severe))  Perjeta (Other (Severe))    MEDICATIONS  (STANDING):  chlorhexidine 2% Cloths 1 Application(s) Topical <User Schedule>  FLUoxetine 80 milliGRAM(s) Oral at bedtime  heparin  Lock Flush 100 Units/mL Injectable 300 Unit(s) IV Push once  HYDROmorphone PCA (1 mG/mL) 30 milliLiter(s) PCA Continuous PCA Continuous  lactulose Syrup 20 Gram(s) Oral three times a day  memantine 10 milliGRAM(s) Oral two times a day  methadone    Tablet 10 milliGRAM(s) Oral <User Schedule>  methadone    Tablet 15 milliGRAM(s) Oral at bedtime  methylphenidate 20 milliGRAM(s) Oral <User Schedule>  octreotide  Injectable 100 MICROGram(s) SubCutaneous two times a day  OLANZapine 2.5 milliGRAM(s) Oral at bedtime  pantoprazole    Tablet 40 milliGRAM(s) Oral two times a day  pregabalin 200 milliGRAM(s) Oral three times a day  sodium chloride 0.9%. 1000 milliLiter(s) (75 mL/Hr) IV Continuous <Continuous>    MEDICATIONS  (PRN):  acetaminophen     Tablet .. 650 milliGRAM(s) Oral every 6 hours PRN Temp greater or equal to 38C (100.4F), Mild Pain (1 - 3)  diazepam    Tablet 2 milliGRAM(s) Oral two times a day PRN for anxiety and/or muscle spasm  ondansetron Injectable 4 milliGRAM(s) IV Push every 6 hours PRN Nausea and/or Vomiting      Vital Signs Last 24 Hrs  T(C): 36.4 (06 Aug 2024 05:55), Max: 37 (05 Aug 2024 14:23)  T(F): 97.5 (06 Aug 2024 05:55), Max: 98.6 (05 Aug 2024 14:23)  HR: 68 (06 Aug 2024 05:55) (68 - 94)  BP: 102/55 (06 Aug 2024 05:55) (101/62 - 147/84)  BP(mean): --  RR: 18 (06 Aug 2024 05:55) (17 - 18)  SpO2: 96% (06 Aug 2024 05:55) (96% - 100%)    Parameters below as of 06 Aug 2024 05:55  Patient On (Oxygen Delivery Method): room air      PHYSICAL EXAM:   Gen: NAD  Head: NC/AT  Neck: supple  Card: regular rate and rhythm. Mild ankle edema bilaterally.  Resp:  CTAB  Abd: soft, non-distended   Ext: DENNEY well  Neuro:  A&O,   Skin:  Warm and dry   Psych:  Normal affect and mood       CBC                             7.4    2.43  )-----------( 112      ( 06 Aug 2024 05:06 )             24.3                             8.9    2.90  )-----------( 125      ( 05 Aug 2024 04:54 )             28.7                           8.5    3.76  )-----------( 137      ( 04 Aug 2024 06:15 )             27.4                           7.1    3.85  )-----------( 114      ( 03 Aug 2024 06:15 )             24.1                                      8.5    2.14  )-----------( 124      ( 02 Aug 2024 06:26 )             28.1                9.7    2.35  )-----------( 133      ( 01 Aug 2024 05:39 )             31.7                           9.4    3.29  )-----------( 144      ( 31 Jul 2024 05:05 )             29.2                10.0   3.50  )-----------( 132      ( 30 Jul 2024 07:53 )             30.4   CHEM        08-04    140  |  106  |  8.4  ----------------------------<  128<H>  3.7   |  23.0  |  0.42<L>    Ca    7.4<L>      04 Aug 2024 06:15    TPro  4.6<L>  /  Alb  2.5<L>  /  TBili  0.6  /  DBili  x   /  AST  46<H>  /  ALT  22  /  AlkPhos  229<H>  08-04 08-04    140  |  106  |  8.4  ----------------------------<  128<H>  3.7   |  23.0  |  0.42<L>    Ca    7.4<L>      04 Aug 2024 06:15    TPro  4.6<L>  /  Alb  2.5<L>  /  TBili  0.6  /  DBili  x   /  AST  46<H>  /  ALT  22  /  AlkPhos  229<H>  08-04 08-03    142  |  110<H>  |  9.1  ----------------------------<  104<H>  4.1   |  24.0  |  0.59    Ca    7.5<L>      03 Aug 2024 06:15    TPro  4.3<L>  /  Alb  2.6<L>  /  TBili  0.4  /  DBili  x   /  AST  47<H>  /  ALT  24  /  AlkPhos  241<H>  08-03 08-02    143  |  109<H>  |  6.9<L>  ----------------------------<  93  4.0   |  26.0  |  0.47<L>    Ca    8.2<L>      02 Aug 2024 06:26    TPro  4.6<L>  /  Alb  2.5<L>  /  TBili  0.5  /  DBili  x   /  AST  43<H>  /  ALT  24  /  AlkPhos  199<H>  08-02 08-01    139  |  108  |  6.2<L>  ----------------------------<  70  4.8   |  22.0  |  0.30<L>    Ca    8.2<L>      01 Aug 2024 05:39  Mg     1.9     07-31    TPro  5.2<L>  /  Alb  2.8<L>  /  TBili  1.1  /  DBili  x   /  AST  62<H>  /  ALT  31  /  AlkPhos  226<H>  08-01 07-31    140  |  106  |  6.6<L>  ----------------------------<  86  4.0   |  27.0  |  0.48<L>    Ca    8.2<L>      31 Jul 2024 05:05  Mg     1.9     07-31    TPro  4.7<L>  /  Alb  2.7<L>  /  TBili  1.0  /  DBili  x   /  AST  81<H>  /  ALT  38<H>  /  AlkPhos  251<H>  07-31 07-29    139  |  108  |  8.8  ----------------------------<  116<H>  3.8   |  26.0  |  0.36<L>    Ca    7.2<L>      29 Jul 2024 14:10    TPro  4.1<L>  /  Alb  2.4<L>  /  TBili  0.4  /  DBili  x   /  AST  58<H>  /  ALT  28  /  AlkPhos  184<H>  07-29

## 2024-08-06 NOTE — PROGRESS NOTE ADULT - ASSESSMENT
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 previously on O2 now weaned off, hx of pericardial effusion, hx of GAVE requiring recurrent transfusions presented to the ED 7/30 due to anemia s/p 3u pRBC with EGD 7/31 with 2 oozing erosions in the antrum now with complaint of dyspnea     Dyspnea in the setting of chronic lung infiltrates/anemia/abdominal distension   CT chest angio negative for PE within limits and with overall stable SINTIA consolidation/patchy opacities   weaned off O2 and maintaining sat   afebrile with no overt evidence of acute infectious process   c/w Symbicort; advised to rinse mouth after use; can be discharged with this    Duoneb prn   pt requests nebulizer script on discharge   incentive spirometry   OOB/ambulate as tolerated   DVT ppx   pt should f/u with pulmonary on discharge   pt advised of need for ongoing CT chest f/u to assess stability of findings   pleural effusion small and not amenable to drainage; can follow with serial imaging as well     please call with any questions/concerns or change in status

## 2024-08-06 NOTE — PROGRESS NOTE ADULT - SUBJECTIVE AND OBJECTIVE BOX
Patient is a 38y old  Female who presents with a chief complaint of Low blood count (06 Aug 2024 13:57)      Interval hx:  No acute events overnight. Weaned off nasal cannula and maintaining sat on RA. Reports she still has some intermittent dyspnea but has been ambulating well and feels some of it may be related to abdominal distension. CT chest angio negative for PE within limits and otherwise demonstrates overall stable patchy opacities and SINTIA consolidation with bronchiectasis and small L effusion. Remains afebrile. Notes slight dry cough. Has been using Symbicort. Notes abdominal distension but has been able to tolerate PO and having bowel movements. Concerned about ascites but imaging with only mild ascites.       PAST MEDICAL & SURGICAL HISTORY:  H/O compression fracture of spine      Anxiety      Metastatic breast cancer      H/O pleural effusion      Pericardial effusion      S/P tonsillectomy      H/O chest tube placement  12/23/21      S/P pericardiocentesis  12/28/21      Medications:    furosemide    Tablet 20 milliGRAM(s) Oral daily    albuterol/ipratropium for Nebulization 3 milliLiter(s) Nebulizer every 6 hours PRN  budesonide 160 MICROgram(s)/formoterol 4.5 MICROgram(s) Inhaler 2 Puff(s) Inhalation two times a day    acetaminophen     Tablet .. 650 milliGRAM(s) Oral every 6 hours PRN  diazepam    Tablet 2 milliGRAM(s) Oral two times a day PRN  FLUoxetine 80 milliGRAM(s) Oral at bedtime  HYDROmorphone PCA (1 mG/mL) 30 milliLiter(s) PCA Continuous PCA Continuous  memantine 10 milliGRAM(s) Oral two times a day  methadone    Tablet 15 milliGRAM(s) Oral at bedtime  methylphenidate 20 milliGRAM(s) Oral <User Schedule>  OLANZapine 2.5 milliGRAM(s) Oral at bedtime  ondansetron Injectable 4 milliGRAM(s) IV Push every 6 hours PRN  pregabalin 200 milliGRAM(s) Oral three times a day      heparin  Lock Flush 100 Units/mL Injectable 300 Unit(s) IV Push once    lactulose Syrup 20 Gram(s) Oral three times a day  pantoprazole    Tablet 40 milliGRAM(s) Oral two times a day      octreotide  Injectable 100 MICROGram(s) SubCutaneous two times a day        chlorhexidine 2% Cloths 1 Application(s) Topical <User Schedule>  hydrocortisone hemorrhoidal Suppository 1 Suppository(s) Rectal three times a day PRN  lidocaine   4% Patch 2 Patch Transdermal every 24 hours    Allergies    pertuzumab (Other (Severe))  Perjeta (Other (Severe))    Intolerances            ICU Vital Signs Last 24 Hrs  T(C): 36.8 (06 Aug 2024 16:51), Max: 36.9 (05 Aug 2024 21:17)  T(F): 98.2 (06 Aug 2024 16:51), Max: 98.4 (05 Aug 2024 21:17)  HR: 78 (06 Aug 2024 16:51) (68 - 94)  BP: 116/76 (06 Aug 2024 16:51) (101/62 - 116/76)  BP(mean): --  ABP: --  ABP(mean): --  RR: 18 (06 Aug 2024 16:51) (18 - 18)  SpO2: 98% (06 Aug 2024 16:51) (96% - 98%)    O2 Parameters below as of 06 Aug 2024 16:51  Patient On (Oxygen Delivery Method): room air                I&O's Detail        LABS:                        9.7    2.84  )-----------( 144      ( 06 Aug 2024 11:49 )             31.6                 CAPILLARY BLOOD GLUCOSE            CULTURES:      Physical Examination:    General: awake, alert, in NAD      HEENT: NC/AT, anicteric, MMM    PULM: Clear to auscultation bilaterally, no accessory muscle use     NECK: Supple, trachea midline    CVS: S1S2, RRR, R chest wall permacath    ABD: Soft, mildly distended, nontender, normoactive bowel sounds    EXT: + 1 edema b/l, nontender    SKIN: Warm and well perfused, no rashes noted.    NEURO: Alert, oriented, interactive, nonfocal    ROS: negative except as per HPI     RADIOLOGY:   < from: CT Angio Chest PE Protocol w/ IV Cont (08.05.24 @ 21:22) >  FINDINGS:    PULMONARY EMBOLISM: Limited evaluation for segmental and subsegmental   pulmonary embolism. No main, left right, or lobar pulmonary embolism..    AIRWAYS AND LUNGS: The central tracheobronchial tree is patent.    Redemonstrated patchy left lung opacities and left upper lobe   consolidation with bronchiectasis. Small loculated left pleural effusion   is unchanged.    MEDIASTINUM AND PLEURA: There are no enlarged mediastinal, hilar or   axillary lymph nodes. The visualized portion of the thyroid gland is  unremarkable. There is no pneumothorax.    HEART AND VESSELS: The heart is normal in size.   There are no   atherosclerotic calcifications of the aorta.  There is no pericardial   effusion.    UPPER ABDOMEN: Images of the upper abdomen demonstrate ascites.    BONES AND SOFT TISSUES: Lytic and sclerotic bony lesions  Breast skin   thickening.    < end of copied text >

## 2024-08-06 NOTE — PROGRESS NOTE ADULT - ASSESSMENT
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 Chapa (Hem / Onc) for anemia. As per patient, after being discharged from the hospital -- she was doing OK however she was feeling bloated and very winded. Her stools are loose due to stool softeners and lately she noticed stool color is dark. She went for blood work and was found to have Hb of 5.7 so she was sent to [facility].  In ER, H&H 5.1/16.9, she was given 3 PRBCs. Repeat H&H 10/30.4. Dr. Chapa recommended CT Abd / Pelvis and GI Evaluation and admission for close monitoring for 48 hours.     Acute on Chronic Anemia due to GAVE  - She is also on Enhertu, which can cause anemia  - s/p 4 PRBCs, last done 8/3  - Gets Venofer and Aranesp as an outpatient  - GI following, s/p EGD w/ 2 oozing erosions s/p cauterization  - hgb trend continues to be stable today  - Hem/Onc following   - protonix BID, octreotide BID     Metastatic Breast Cancer   - On Enhertu (last dose 6/19)  - Hem / Onc following  - Outpatient follow up with Rad / Onc  - Palliative recs appreciated  - pain management following and managing PCA pump  - c/w home pregabalin, methadone  - start lidocaine patches     Hx of elevated ammonia levels with encephalopathy  - c/w home lactulose (patient tends to refuse)    Hx of ILD  - pt on RA, ambulating without desat  - pt has home O2 for occasional LOUIE  - pulm following for optimization per pt request  - c/w Symbicort     Thrombocytopenia  - Chronic and intermittent   - Likely due to Enhertu  - Monitor      Anxiety / Mood Disorder  - Continue Fluoxetine, Olanzapine and Diazepam (PRN)    DVT Prophylaxis -- Venodyne     Dispo: likely home tomorrow

## 2024-08-06 NOTE — PROGRESS NOTE ADULT - ASSESSMENT
38-year-old female who follows with Dr Chapa/OMUOU for a history of metastatic breast Ca S/P Taxoetere/Herceptin with brain mets S/P WBRT now on Enhertu LD 6/19 and GARETT due to GAVE On Octerotide LD 7/18 and iv iron LD weekly LD 7/18 including prior w/u with GI (noted to have telangiectasias on EGD / GAVE) , Sent from the office for a 10 lb weight gain and hgb of 5.7 Placed in obs Hgb 5.1 S/P 3 U PRNC hgb now 10    metastatic breast cancer   - S/P Taxoetere/Herceptin   - brain mets S/P WBRT  - on Enhertu LD 6/19  - treatment on hold while hospitalized   - MRI of brain preformed 7/2/2024 2 mm enhancing lesion involving the right frontal lobe not seen on the prior study. Decrease in size of multiple enhancing infra and supratentorial lesions. On surveillance.  - Last PET on 4/3/24 showed Asymmetric foci of hypermetabolism in the right cerebellum corresponding to lesions evident on concurrent MRI. Very mild non significant hypermetabolism associated with some of multiple mixed lytic/sclerotic lesions throughout skeleton.  No significant hypermetabolic foci to suggest metastatic activity   - CT A/P  showed mild layering left pleural effusion. Pt complaining of increasing SOB and CP- CXR showed Persistent mass left upper lobe. Increasing interstitial markings off both sharon.    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 Normal duodenum. Friability. There were 2 erosions which were oozing. There were small erythematous spots in the antrum as well. In the antrum. cauterization of oozing erosions in the antrum  -CT Abd / Pelvis  Mild ascites. Splenomegaly. LIVER: Within normal limits. Hepatic veins and portal vein are patent.    - Ammonia level 81   - Ammonia concentration increases in red cell units (RBCs) during storage and pt has been getting frequent transfusions?   - Enhurtu can also raise LFT's  - ferritin 349 ALK Phos  213 ( known bone mets ) AST 77 ALT 37 Drawn in the office on 7/29   - 8/1/2024 MRI ABD w and W/O contrast to evaluate liver Shows No evidence of liver metastases. Small amount of ascites.    CBC is stable   WBC 2.8 Hgb 7.4 plt 112k     Will follow

## 2024-08-06 NOTE — PROGRESS NOTE ADULT - SUBJECTIVE AND OBJECTIVE BOX
[facility] Division of Medicine    SUBJECTIVE / OVERNIGHT EVENTS: No overnight events as per RN. Pt seen at the bedside. Denies any new complaints. All other systems reviewed and are negative.    MEDICATIONS  (STANDING):  budesonide 160 MICROgram(s)/formoterol 4.5 MICROgram(s) Inhaler 2 Puff(s) Inhalation two times a day  chlorhexidine 2% Cloths 1 Application(s) Topical <User Schedule>  FLUoxetine 80 milliGRAM(s) Oral at bedtime  heparin  Lock Flush 100 Units/mL Injectable 300 Unit(s) IV Push once  HYDROmorphone PCA (1 mG/mL) 30 milliLiter(s) PCA Continuous PCA Continuous  lactulose Syrup 20 Gram(s) Oral three times a day  lidocaine   4% Patch 2 Patch Transdermal every 24 hours  memantine 10 milliGRAM(s) Oral two times a day  methadone    Tablet 15 milliGRAM(s) Oral at bedtime  methylphenidate 20 milliGRAM(s) Oral <User Schedule>  octreotide  Injectable 100 MICROGram(s) SubCutaneous two times a day  OLANZapine 2.5 milliGRAM(s) Oral at bedtime  pantoprazole    Tablet 40 milliGRAM(s) Oral two times a day  pregabalin 200 milliGRAM(s) Oral three times a day    MEDICATIONS  (PRN):  acetaminophen     Tablet .. 650 milliGRAM(s) Oral every 6 hours PRN Temp greater or equal to 38C (100.4F), Mild Pain (1 - 3)  albuterol/ipratropium for Nebulization 3 milliLiter(s) Nebulizer every 6 hours PRN Bronchospasm  diazepam    Tablet 2 milliGRAM(s) Oral two times a day PRN for anxiety and/or muscle spasm  hydrocortisone hemorrhoidal Suppository 1 Suppository(s) Rectal three times a day PRN consitpation or rectal pain  ondansetron Injectable 4 milliGRAM(s) IV Push every 6 hours PRN Nausea and/or Vomiting      I&O's Summary      acetaminophen     Tablet .. 650 milliGRAM(s) Oral every 6 hours PRN  albuterol/ipratropium for Nebulization 3 milliLiter(s) Nebulizer every 6 hours PRN  budesonide 160 MICROgram(s)/formoterol 4.5 MICROgram(s) Inhaler 2 Puff(s) Inhalation two times a day  chlorhexidine 2% Cloths 1 Application(s) Topical <User Schedule>  diazepam    Tablet 2 milliGRAM(s) Oral two times a day PRN  FLUoxetine 80 milliGRAM(s) Oral at bedtime  heparin  Lock Flush 100 Units/mL Injectable 300 Unit(s) IV Push once  hydrocortisone hemorrhoidal Suppository 1 Suppository(s) Rectal three times a day PRN  HYDROmorphone PCA (1 mG/mL) 30 milliLiter(s) PCA Continuous PCA Continuous  lactulose Syrup 20 Gram(s) Oral three times a day  lidocaine   4% Patch 2 Patch Transdermal every 24 hours  memantine 10 milliGRAM(s) Oral two times a day  methadone    Tablet 15 milliGRAM(s) Oral at bedtime  methylphenidate 20 milliGRAM(s) Oral <User Schedule>  octreotide  Injectable 100 MICROGram(s) SubCutaneous two times a day  OLANZapine 2.5 milliGRAM(s) Oral at bedtime  ondansetron Injectable 4 milliGRAM(s) IV Push every 6 hours PRN  pantoprazole    Tablet 40 milliGRAM(s) Oral two times a day  pregabalin 200 milliGRAM(s) Oral three times a day      PHYSICAL EXAM:  Vital Signs Last 24 Hrs  T(C): 36.4 (06 Aug 2024 05:55), Max: 37 (05 Aug 2024 14:23)  T(F): 97.5 (06 Aug 2024 05:55), Max: 98.6 (05 Aug 2024 14:23)  HR: 68 (06 Aug 2024 05:55) (68 - 94)  BP: 102/55 (06 Aug 2024 05:55) (101/62 - 112/75)  BP(mean): --  RR: 18 (06 Aug 2024 05:55) (17 - 18)  SpO2: 96% (06 Aug 2024 05:55) (96% - 97%)    Parameters below as of 06 Aug 2024 05:55  Patient On (Oxygen Delivery Method): room air          CONSTITUTIONAL: no apparent distress  RESP: No respiratory distress, clear to auscultation bilaterally, no wheezes or rales  CV: RRR, +S1S2, no peripheral edema  GI: Soft, NT, ND  PSYCH: A+O x 3  NEURO: Cooperative, upper and lower motor function grossly intact bilaterally, sensation grossly intact throughout          LABS:                        9.7    2.84  )-----------( 144      ( 06 Aug 2024 11:49 )             31.6                     CAPILLARY BLOOD GLUCOSE          IMAGING:

## 2024-08-07 ENCOUNTER — TRANSCRIPTION ENCOUNTER (OUTPATIENT)
Age: 39
End: 2024-08-07

## 2024-08-07 VITALS
DIASTOLIC BLOOD PRESSURE: 60 MMHG | TEMPERATURE: 98 F | OXYGEN SATURATION: 95 % | HEART RATE: 76 BPM | SYSTOLIC BLOOD PRESSURE: 101 MMHG | RESPIRATION RATE: 18 BRPM

## 2024-08-07 LAB — LEGIONELLA AG UR QL: NEGATIVE — SIGNIFICANT CHANGE UP

## 2024-08-07 PROCEDURE — 82746 ASSAY OF FOLIC ACID SERUM: CPT

## 2024-08-07 PROCEDURE — 36430 TRANSFUSION BLD/BLD COMPNT: CPT

## 2024-08-07 PROCEDURE — 86850 RBC ANTIBODY SCREEN: CPT

## 2024-08-07 PROCEDURE — 86901 BLOOD TYPING SEROLOGIC RH(D): CPT

## 2024-08-07 PROCEDURE — 99239 HOSP IP/OBS DSCHRG MGMT >30: CPT

## 2024-08-07 PROCEDURE — 83735 ASSAY OF MAGNESIUM: CPT

## 2024-08-07 PROCEDURE — 74177 CT ABD & PELVIS W/CONTRAST: CPT | Mod: MC

## 2024-08-07 PROCEDURE — 86900 BLOOD TYPING SEROLOGIC ABO: CPT

## 2024-08-07 PROCEDURE — 96374 THER/PROPH/DIAG INJ IV PUSH: CPT

## 2024-08-07 PROCEDURE — P9040: CPT

## 2024-08-07 PROCEDURE — 74183 MRI ABD W/O CNTR FLWD CNTR: CPT | Mod: MC

## 2024-08-07 PROCEDURE — 36415 COLL VENOUS BLD VENIPUNCTURE: CPT

## 2024-08-07 PROCEDURE — 87449 NOS EACH ORGANISM AG IA: CPT

## 2024-08-07 PROCEDURE — 84702 CHORIONIC GONADOTROPIN TEST: CPT

## 2024-08-07 PROCEDURE — 99285 EMERGENCY DEPT VISIT HI MDM: CPT

## 2024-08-07 PROCEDURE — 80053 COMPREHEN METABOLIC PANEL: CPT

## 2024-08-07 PROCEDURE — 94640 AIRWAY INHALATION TREATMENT: CPT

## 2024-08-07 PROCEDURE — 86922 COMPATIBILITY TEST ANTIGLOB: CPT

## 2024-08-07 PROCEDURE — G0378: CPT

## 2024-08-07 PROCEDURE — 71275 CT ANGIOGRAPHY CHEST: CPT | Mod: MC

## 2024-08-07 PROCEDURE — 85027 COMPLETE CBC AUTOMATED: CPT

## 2024-08-07 PROCEDURE — 71045 X-RAY EXAM CHEST 1 VIEW: CPT

## 2024-08-07 PROCEDURE — 96376 TX/PRO/DX INJ SAME DRUG ADON: CPT

## 2024-08-07 PROCEDURE — 85610 PROTHROMBIN TIME: CPT

## 2024-08-07 PROCEDURE — 85025 COMPLETE CBC W/AUTO DIFF WBC: CPT

## 2024-08-07 PROCEDURE — 82607 VITAMIN B-12: CPT

## 2024-08-07 PROCEDURE — 0225U NFCT DS DNA&RNA 21 SARSCOV2: CPT

## 2024-08-07 PROCEDURE — 82140 ASSAY OF AMMONIA: CPT

## 2024-08-07 RX ORDER — HYDROMORPHONE HCL IN 0.9% NACL 0.2 MG/ML
4 PLASTIC BAG, INJECTION (ML) INTRAVENOUS EVERY 4 HOURS
Refills: 0 | Status: DISCONTINUED | OUTPATIENT
Start: 2024-08-07 | End: 2024-08-07

## 2024-08-07 RX ORDER — BUDESONIDE AND FORMOTEROL FUMARATE DIHYDRATE 80; 4.5 UG/1; UG/1
2 AEROSOL RESPIRATORY (INHALATION)
Qty: 1 | Refills: 0
Start: 2024-08-07 | End: 2024-09-05

## 2024-08-07 RX ORDER — ALBUTEROL 90 MCG
2 AEROSOL REFILL (GRAM) INHALATION
Qty: 1 | Refills: 0
Start: 2024-08-07

## 2024-08-07 RX ORDER — HYDROCORTISONE 1 %
1 CREAM (GRAM) TOPICAL
Qty: 90 | Refills: 0
Start: 2024-08-07 | End: 2024-09-05

## 2024-08-07 RX ORDER — PANTOPRAZOLE SODIUM 20 MG/1
1 TABLET, DELAYED RELEASE ORAL
Qty: 60 | Refills: 0
Start: 2024-08-07 | End: 2024-09-05

## 2024-08-07 RX ORDER — HYDROMORPHONE HCL IN 0.9% NACL 0.2 MG/ML
8 PLASTIC BAG, INJECTION (ML) INTRAVENOUS EVERY 4 HOURS
Refills: 0 | Status: DISCONTINUED | OUTPATIENT
Start: 2024-08-07 | End: 2024-08-07

## 2024-08-07 RX ORDER — HYDROMORPHONE HCL IN 0.9% NACL 0.2 MG/ML
1 PLASTIC BAG, INJECTION (ML) INTRAVENOUS
Qty: 42 | Refills: 0
Start: 2024-08-07 | End: 2024-08-13

## 2024-08-07 RX ORDER — LIDOCAINE 5% 5 G/100G
2 CREAM TOPICAL
Qty: 60 | Refills: 0
Start: 2024-08-07 | End: 2024-09-05

## 2024-08-07 RX ORDER — FUROSEMIDE 10 MG/ML
1 INJECTION, SOLUTION INTRAVENOUS
Qty: 30 | Refills: 0
Start: 2024-08-07 | End: 2024-09-05

## 2024-08-07 RX ADMIN — PANTOPRAZOLE SODIUM 40 MILLIGRAM(S): 20 TABLET, DELAYED RELEASE ORAL at 06:32

## 2024-08-07 RX ADMIN — FUROSEMIDE 20 MILLIGRAM(S): 10 INJECTION, SOLUTION INTRAVENOUS at 06:32

## 2024-08-07 RX ADMIN — Medication 300 UNIT(S): at 13:46

## 2024-08-07 RX ADMIN — Medication 20 GRAM(S): at 12:22

## 2024-08-07 RX ADMIN — Medication 30 MILLILITER(S): at 07:58

## 2024-08-07 RX ADMIN — MEMANTINE HYDROCHLORIDE 10 MILLIGRAM(S): 14 CAPSULE, EXTENDED RELEASE ORAL at 06:33

## 2024-08-07 RX ADMIN — BUDESONIDE AND FORMOTEROL FUMARATE DIHYDRATE 2 PUFF(S): 80; 4.5 AEROSOL RESPIRATORY (INHALATION) at 12:23

## 2024-08-07 RX ADMIN — LIDOCAINE 5% 2 PATCH: 5 CREAM TOPICAL at 06:56

## 2024-08-07 RX ADMIN — OCTREOTIDE ACETATE 100 MICROGRAM(S): 200 INJECTION INTRAVENOUS at 06:33

## 2024-08-07 RX ADMIN — CHLORHEXIDINE GLUCONATE 1 APPLICATION(S): 500 CLOTH TOPICAL at 13:46

## 2024-08-07 NOTE — DISCHARGE NOTE NURSING/CASE MANAGEMENT/SOCIAL WORK - NSDCPEFALRISK_GEN_ALL_CORE
For information on Fall & Injury Prevention, visit: [email or web address] OR  [email or web address] OR  https://www.cdc.gov/steadi/patient.html

## 2024-08-07 NOTE — DISCHARGE NOTE PROVIDER - HOSPITAL COURSE
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),  Pericardial Effusion, Spinal Compression Fractures and recent hospitalizations (last one 10 days ago, discharged on 7/25/24) sent by Dr. Chapa (Hem / Onc) for anemia. 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),  Pericardial Effusion, Spinal Compression Fractures and recent hospitalizations (last one 10 days ago, discharged on 7/25/24) sent by Dr. Chapa (Hem / Onc) for anemia. she received 4U prbc and GI consulted given pt having dark stools. She underwent EGD showing 2 oozing ulcers s/p cauterization. Hgb levels remained stable afterwards. Medications were adjusted accordingly. Pain management was consulted for assistance with pain while admitted. Pulm consulted per pt request for optimization of ILD and pt started on inhalers. She is otherwise medically optimized for discharge home today.

## 2024-08-07 NOTE — PROGRESS NOTE ADULT - SUBJECTIVE AND OBJECTIVE BOX
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 On Octerotide LD 7/18 and iv iron LD weekly LD 7/18 including prior w/u with GI (noted to have telangiectasias on EGD / GAVE) , Sent form the office for a 10 lb weight gain anf hgb of 5.7 Placed in obs Hgb 5.1 S/P 3 U PRNC hgb now 10    PAST MEDICAL & SURGICAL HISTORY:  H/O compression fracture of spine  Anxiety  Metastatic breast cancer  H/O pleural effusion  Pericardial effusion  S/P tonsillectomy  H/O chest tube placement  12/23/21  S/P pericardiocentesis  12/28/21    Allergies  pertuzumab (Other (Severe))  Perjeta (Other (Severe))    MEDICATIONS  (STANDING):  chlorhexidine 2% Cloths 1 Application(s) Topical <User Schedule>  FLUoxetine 80 milliGRAM(s) Oral at bedtime  heparin  Lock Flush 100 Units/mL Injectable 300 Unit(s) IV Push once  HYDROmorphone PCA (1 mG/mL) 30 milliLiter(s) PCA Continuous PCA Continuous  lactulose Syrup 20 Gram(s) Oral three times a day  memantine 10 milliGRAM(s) Oral two times a day  methadone    Tablet 10 milliGRAM(s) Oral <User Schedule>  methadone    Tablet 15 milliGRAM(s) Oral at bedtime  methylphenidate 20 milliGRAM(s) Oral <User Schedule>  octreotide  Injectable 100 MICROGram(s) SubCutaneous two times a day  OLANZapine 2.5 milliGRAM(s) Oral at bedtime  pantoprazole    Tablet 40 milliGRAM(s) Oral two times a day  pregabalin 200 milliGRAM(s) Oral three times a day  sodium chloride 0.9%. 1000 milliLiter(s) (75 mL/Hr) IV Continuous <Continuous>    MEDICATIONS  (PRN):  acetaminophen     Tablet .. 650 milliGRAM(s) Oral every 6 hours PRN Temp greater or equal to 38C (100.4F), Mild Pain (1 - 3)  diazepam    Tablet 2 milliGRAM(s) Oral two times a day PRN for anxiety and/or muscle spasm  ondansetron Injectable 4 milliGRAM(s) IV Push every 6 hours PRN Nausea and/or Vomiting    Vital Signs Last 24 Hrs  T(C): 36.6 (07 Aug 2024 04:30), Max: 37 (06 Aug 2024 21:03)  T(F): 97.9 (07 Aug 2024 04:30), Max: 98.6 (06 Aug 2024 21:03)  HR: 78 (07 Aug 2024 06:20) (69 - 90)  BP: 99/65 (07 Aug 2024 06:20) (90/60 - 116/76)  BP(mean): --  RR: 18 (07 Aug 2024 04:30) (18 - 18)  SpO2: 94% (07 Aug 2024 04:30) (94% - 98%)    Parameters below as of 07 Aug 2024 04:30  Patient On (Oxygen Delivery Method): room air      PHYSICAL EXAM:   Gen: NAD  Head: NC/AT  Neck: supple  Card: regular rate and rhythm. Mild ankle edema bilaterally.  Resp:  CTAB  Abd: soft, non-distended   Ext: DENNEY well  Neuro:  A&O,   Skin:  Warm and dry   Psych:  Normal affect and mood       CBC        No new labs                        7.4    2.43  )-----------( 112      ( 06 Aug 2024 05:06 )             24.3                             8.9    2.90  )-----------( 125      ( 05 Aug 2024 04:54 )             28.7                           8.5    3.76  )-----------( 137      ( 04 Aug 2024 06:15 )             27.4                           7.1    3.85  )-----------( 114      ( 03 Aug 2024 06:15 )             24.1                                      8.5    2.14  )-----------( 124      ( 02 Aug 2024 06:26 )             28.1                9.7    2.35  )-----------( 133      ( 01 Aug 2024 05:39 )             31.7                           9.4    3.29  )-----------( 144      ( 31 Jul 2024 05:05 )             29.2                10.0   3.50  )-----------( 132      ( 30 Jul 2024 07:53 )             30.4   CHEM        08-04    140  |  106  |  8.4  ----------------------------<  128<H>  3.7   |  23.0  |  0.42<L>    Ca    7.4<L>      04 Aug 2024 06:15    TPro  4.6<L>  /  Alb  2.5<L>  /  TBili  0.6  /  DBili  x   /  AST  46<H>  /  ALT  22  /  AlkPhos  229<H>  08-04 08-04    140  |  106  |  8.4  ----------------------------<  128<H>  3.7   |  23.0  |  0.42<L>    Ca    7.4<L>      04 Aug 2024 06:15    TPro  4.6<L>  /  Alb  2.5<L>  /  TBili  0.6  /  DBili  x   /  AST  46<H>  /  ALT  22  /  AlkPhos  229<H>  08-04 08-03    142  |  110<H>  |  9.1  ----------------------------<  104<H>  4.1   |  24.0  |  0.59    Ca    7.5<L>      03 Aug 2024 06:15    TPro  4.3<L>  /  Alb  2.6<L>  /  TBili  0.4  /  DBili  x   /  AST  47<H>  /  ALT  24  /  AlkPhos  241<H>  08-03 08-02    143  |  109<H>  |  6.9<L>  ----------------------------<  93  4.0   |  26.0  |  0.47<L>    Ca    8.2<L>      02 Aug 2024 06:26    TPro  4.6<L>  /  Alb  2.5<L>  /  TBili  0.5  /  DBili  x   /  AST  43<H>  /  ALT  24  /  AlkPhos  199<H>  08-02 08-01    139  |  108  |  6.2<L>  ----------------------------<  70  4.8   |  22.0  |  0.30<L>    Ca    8.2<L>      01 Aug 2024 05:39  Mg     1.9     07-31    TPro  5.2<L>  /  Alb  2.8<L>  /  TBili  1.1  /  DBili  x   /  AST  62<H>  /  ALT  31  /  AlkPhos  226<H>  08-01 07-31    140  |  106  |  6.6<L>  ----------------------------<  86  4.0   |  27.0  |  0.48<L>    Ca    8.2<L>      31 Jul 2024 05:05  Mg     1.9     07-31    TPro  4.7<L>  /  Alb  2.7<L>  /  TBili  1.0  /  DBili  x   /  AST  81<H>  /  ALT  38<H>  /  AlkPhos  251<H>  07-31 07-29    139  |  108  |  8.8  ----------------------------<  116<H>  3.8   |  26.0  |  0.36<L>    Ca    7.2<L>      29 Jul 2024 14:10    TPro  4.1<L>  /  Alb  2.4<L>  /  TBili  0.4  /  DBili  x   /  AST  58<H>  /  ALT  28  /  AlkPhos  184<H>  07-29

## 2024-08-07 NOTE — DISCHARGE NOTE PROVIDER - CARE PROVIDER_API CALL
Hermes Rosado  Gastroenterology  [address]  [place] [address]  Phone: [phone]  Fax: [phone]  Follow Up Time: 2 weeks

## 2024-08-07 NOTE — PROGRESS NOTE ADULT - PROVIDER SPECIALTY LIST ADULT
Heme/Onc
Hospitalist
Hospitalist
Pain Medicine
Pain Medicine
Pulmonology
Gastroenterology
Heme/Onc
Hospitalist
Gastroenterology
Gastroenterology
Heme/Onc
Hospitalist
Pain Medicine
Pulmonology

## 2024-08-07 NOTE — ASU PREOP CHECKLIST - HEIGHT IN INCHES
1. Primary osteoarthritis of right hip      Schedule  surgery  Surgery Scheduler will contact you to assist with scheduling surgery.   You can contact her directly at [phone].   Prior to your scheduled surgery we advise scheduling with your dentist to obtain clearance for surgery, and to complete any recommended dental work prior.     Pre-operative Physical needed within 30 days of scheduled proceedure  Physical Therapy will be scheduled to start post op day 1.    For mor information regarding total joint surgery please visit our website:   [email or web address]    FORMS:   If you are needing any forms completed relating to your upcoming procedure, please send them to our office with a completed Release of Information.   Forms will be completed AFTER your procedure. A letter can be sent to your employer prior to surgery to inform them of your anticipated time off.    Please notify our staff if you would like a letter to do so.   Forms can be faxed directly to our clinic at [phone].     DO NOT BRING FORMS ON THE DATE OF SURGERY.     MEDICATION REFILL:   Please allow 3 business days for completion of medication refills for any surgery related prescription.   Medication refills submitted on Friday, may not be addressed until the following Monday.  You may request a refill via [employer], or by calling our Nurse Triage at [phone].      Call my office with any questions or concerns, [phone].      1

## 2024-08-07 NOTE — PROGRESS NOTE ADULT - ASSESSMENT
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 Decrease in size of multiple enhancing infra and supratentorial lesions. On surveillance.  - Last PET on 4/3/24 showed Asymmetric foci of hypermetabolism in the right cerebellum corresponding to lesions evident on concurrent MRI. Very mild non significant hypermetabolism associated with some of multiple mixed lytic/sclerotic lesions throughout skeleton.  No significant hypermetabolic foci to suggest metastatic activity   - CT A/P  showed mild layering left pleural effusion. Pt complaining of increasing SOB and CP- CXR showed Persistent mass left upper lobe. Increasing interstitial markings off both sharon.    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 Normal duodenum. Friability. There were 2 erosions which were oozing. There were small erythematous spots in the antrum as well. In the antrum. cauterization of oozing erosions in the antrum  -CT Abd / Pelvis  Mild ascites. Splenomegaly. LIVER: Within normal limits. Hepatic veins and portal vein are patent.    - Ammonia level 81   - Ammonia concentration increases in red cell units (RBCs) during storage and pt has been getting frequent transfusions?   - Enhurtu can also raise LFT's  - ferritin 349 ALK Phos  213 ( known bone mets ) AST 77 ALT 37 Drawn in the office on 7/29   - 8/1/2024 MRI ABD w and W/O contrast to evaluate liver Shows No evidence of liver metastases. Small amount of ascites.    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 Decrease in size of multiple enhancing infra and supratentorial lesions. On surveillance.  - Last PET on 4/3/24 showed Asymmetric foci of hypermetabolism in the right cerebellum corresponding to lesions evident on concurrent MRI. Very mild non significant hypermetabolism associated with some of multiple mixed lytic/sclerotic lesions throughout skeleton.  No significant hypermetabolic foci to suggest metastatic activity   - CT A/P  showed mild layering left pleural effusion. Pt complaining of increasing SOB and CP- CXR showed Persistent mass left upper lobe. Increasing interstitial markings off both sharon.    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 Normal duodenum. Friability. There were 2 erosions which were oozing. There were small erythematous spots in the antrum as well. In the antrum. cauterization of oozing erosions in the antrum  -CT Abd / Pelvis  Mild ascites. Splenomegaly. LIVER: Within normal limits. Hepatic veins and portal vein are patent.    - Ammonia level 81   - Ammonia concentration increases in red cell units (RBCs) during storage and pt has been getting frequent transfusions?   - Enhurtu can also raise LFT's  - ferritin 349 ALK Phos  213 ( known bone mets ) AST 77 ALT 37 Drawn in the office on 7/29   - 8/1/2024 MRI ABD w and W/O contrast to evaluate liver Shows No evidence of liver metastases. Small amount of ascites.    CBC is stable   WBC 2.8 Hgb 7.4 plt 112k on 8/6     Anticipating discharge- May deaccess and heparinize PAC    Follow up with Dr Chapa in the office after discharge

## 2024-08-07 NOTE — DISCHARGE NOTE PROVIDER - ATTENDING DISCHARGE PHYSICAL EXAMINATION:
T(C): 36.9 (08-07-24 @ 10:18), Max: 37 (08-06-24 @ 21:03)  HR: 64 (08-07-24 @ 10:18) (64 - 90)  BP: 99/65 (08-07-24 @ 06:20) (90/60 - 116/76)  RR: 18 (08-07-24 @ 10:18) (18 - 18)  SpO2: 94% (08-07-24 @ 10:18) (94% - 98%)    CONSTITUTIONAL: no apparent distress  EYES: PERRLA, EOMI  ENMT: Oral mucosa with moist membranes  RESP: No respiratory distress, clear to auscultation bilaterally, no wheezes or rales  CV: RRR, +S1S2, +1 peripheral edema  GI: Soft, NT, ND   PSYCH: A+O x 3, mood and affect appropriate  NEURO: Cooperative, upper and lower motor function grossly intact bilaterally, sensation grossly intact throughout

## 2024-08-07 NOTE — DISCHARGE NOTE PROVIDER - NSDCCPCAREPLAN_GEN_ALL_CORE_FT
PRINCIPAL DISCHARGE DIAGNOSIS  Diagnosis: Anemia of chronic disease  Assessment and Plan of Treatment: Continue GI medications as prescribed and monitor for recurrence of bloody or black stools. If either occurs please come back to the ED for evaluation. Otherwise follow up with GI in 2 weeks.      SECONDARY DISCHARGE DIAGNOSES  Diagnosis: Abdominal pain  Assessment and Plan of Treatment:

## 2024-08-07 NOTE — PROGRESS NOTE ADULT - REASON FOR ADMISSION
Anemia
Low blood count
Low blood counts
Low blood count

## 2024-08-07 NOTE — DISCHARGE NOTE NURSING/CASE MANAGEMENT/SOCIAL WORK - PATIENT PORTAL LINK FT
You can access the FollowMyHealth Patient Portal offered by [facility] by registering at the following website: [email or web address]. By joining [facility]’s FollowMyHealth portal, you will also be able to view your health information using other applications (apps) compatible with our system.

## 2024-08-07 NOTE — DISCHARGE NOTE PROVIDER - NSDCMRMEDTOKEN_GEN_ALL_CORE_FT
amoxicillin-clavulanate 875 mg-125 mg oral tablet: 1 tab(s) orally 2 times a day  denosumab 120 mg/1.7 mL subcutaneous solution: 70 milligram(s) subcutaneously every 28 days  diazePAM 2 mg oral tablet: 1 tab(s) orally 2 times a day as needed for  anxiety and/or muscle spasm  FLUoxetine 40 mg oral capsule: 2 cap(s) orally once a day (at bedtime)  HYDROmorphone 2 mg oral tablet: 1 tab(s) orally every 4 hours as needed for  pain  HYDROmorphone 4 mg oral tablet: 1 tab(s) orally every 4 hours as needed for  pain  lactulose 10 g/15 mL oral syrup: 30 milliliter(s) orally 3 times a day Take up to the amount required to have 1-2 bowel movements per day  memantine 10 mg oral tablet: 1 tab(s) orally 2 times a day  methadone: 15 milligram(s) orally once a day (at bedtime)  methadone 10 mg oral tablet: 1 tab(s) orally 2 times a day  methylnaltrexone 150 mg oral tablet: 1 tab(s) orally once a day as needed for  constipation  methylphenidate 20 mg oral tablet: 1 tab(s) orally once a day at 6AM and 12PM  naloxone 4 mg/0.1 mL nasal spray: 1 spray(s) intranasally 3 times a day as needed for  unintentional opioid overdose  octreotide 100 mcg/mL injectable solution: 100 microgram(s) 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  OLANZapine 2.5 mg oral tablet: 1 tab(s) orally once a day (at bedtime)  pantoprazole 40 mg oral delayed release tablet: 1 tab(s) orally once a day  pregabalin 200 mg oral capsule: 1 cap(s) orally 3 times a day  Zofran 8 mg oral tablet: 1 tab(s) orally 3 times a day as needed for  nausea and/or vomiting   Albuterol (Eqv-ProAir HFA) 90 mcg/inh inhalation aerosol: 2 puff(s) inhaled every 6 hours as needed for  bronchospasm  budesonide-formoterol 160 mcg-4.5 mcg/inh inhalation aerosol: 2 puff(s) inhaled 2 times a day  denosumab 120 mg/1.7 mL subcutaneous solution: 70 milligram(s) subcutaneously every 28 days  diazePAM 2 mg oral tablet: 1 tab(s) orally 2 times a day as needed for  anxiety and/or muscle spasm  FLUoxetine 40 mg oral capsule: 2 cap(s) orally once a day (at bedtime)  furosemide 20 mg oral tablet: 1 tab(s) orally once a day  hydrocortisone 25 mg rectal suppository: 1 suppository(ies) rectal 3 times a day as needed for consitpation or rectal pain  lactulose 10 g/15 mL oral syrup: 30 milliliter(s) orally 3 times a day Take up to the amount required to have 1-2 bowel movements per day  lidocaine 4% topical film: Apply topically to affected area every 24 hours as needed for  muscle spasm  memantine 10 mg oral tablet: 1 tab(s) orally 2 times a day  methadone: 15 milligram(s) orally once a day (at bedtime)  methadone 10 mg oral tablet: 1 tab(s) orally 2 times a day  methylnaltrexone 150 mg oral tablet: 1 tab(s) orally once a day as needed for  constipation  methylphenidate 20 mg oral tablet: 1 tab(s) orally once a day at 6AM and 12PM  naloxone 4 mg/0.1 mL nasal spray: 1 spray(s) intranasally 3 times a day as needed for  unintentional opioid overdose  octreotide 100 mcg/mL injectable solution: 100 microgram(s) 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  OLANZapine 2.5 mg oral tablet: 1 tab(s) orally once a day (at bedtime)  pregabalin 200 mg oral capsule: 1 cap(s) orally 3 times a day  Protonix 40 mg oral delayed release tablet: 1 tab(s) orally 2 times a day  Zofran 8 mg oral tablet: 1 tab(s) orally 3 times a day as needed for  nausea and/or vomiting   Albuterol (Eqv-ProAir HFA) 90 mcg/inh inhalation aerosol: 2 puff(s) inhaled every 6 hours as needed for  bronchospasm  budesonide-formoterol 160 mcg-4.5 mcg/inh inhalation aerosol: 2 puff(s) inhaled 2 times a day  denosumab 120 mg/1.7 mL subcutaneous solution: 70 milligram(s) subcutaneously every 28 days  diazePAM 2 mg oral tablet: 1 tab(s) orally 2 times a day as needed for  anxiety and/or muscle spasm  FLUoxetine 40 mg oral capsule: 2 cap(s) orally once a day (at bedtime)  furosemide 20 mg oral tablet: 1 tab(s) orally once a day  hydrocortisone 25 mg rectal suppository: 1 suppository(ies) rectal 3 times a day as needed for consitpation or rectal pain  HYDROmorphone 8 mg oral tablet: 1 tab(s) orally every 4 hours as needed for  severe pain For moderate pain, take 0.5 tab, for severe pain take full tab MDD: 6  lactulose 10 g/15 mL oral syrup: 30 milliliter(s) orally 3 times a day Take up to the amount required to have 1-2 bowel movements per day  lidocaine 4% topical film: Apply topically to affected area every 24 hours as needed for  muscle spasm  memantine 10 mg oral tablet: 1 tab(s) orally 2 times a day  methadone: 15 milligram(s) orally once a day (at bedtime)  methadone 10 mg oral tablet: 1 tab(s) orally 2 times a day  methylnaltrexone 150 mg oral tablet: 1 tab(s) orally once a day as needed for  constipation  methylphenidate 20 mg oral tablet: 1 tab(s) orally once a day at 6AM and 12PM  naloxone 4 mg/0.1 mL nasal spray: 1 spray(s) intranasally 3 times a day as needed for  unintentional opioid overdose  octreotide 100 mcg/mL injectable solution: 100 microgram(s) 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  OLANZapine 2.5 mg oral tablet: 1 tab(s) orally once a day (at bedtime)  pregabalin 200 mg oral capsule: 1 cap(s) orally 3 times a day  Protonix 40 mg oral delayed release tablet: 1 tab(s) orally 2 times a day  Zofran 8 mg oral tablet: 1 tab(s) orally 3 times a day as needed for  nausea and/or vomiting

## 2024-08-08 NOTE — PROGRESS NOTE ADULT - ASSESSMENT
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 Santana), Anxiety, and Muscle Spasms who presents to the ED with complaints of worsening SOB and found to have bilateral pleural effusions, left sided opacities and worsening lymphangitic spread of disease. Reports recent admission 12/3 for same at [facility] - had CT pleural effusion drainage > 1lit. She was discharged home on 12/13 on 2-4 lit NC oxygen. She had chest tube placement on 12/23 per thoracic sx service with 1lit drained. ECHO - showed moderate to severe pericardial effusion with cardiac tamponade physiology. this was discussed extensively with cardio, thoracic sx for possible pericardial window - but at this time, she is not deemed a good candidate for this. Repeat Echo showed persistent pericardial effusion. S/ pericardiocentesis on 12/28. Patient also received inpatient  Chemo on 12/27.     Acute on Chronic Hypoxic Respiratory Failure likely due to Malignant exudative Effusions and worsening lymphangitic spread of disease, improving  by lights criteria effusion is Exudative   - s/p CT  -currently on NC 6L, wean as able  -CT angio negative for PE; bilateral pleural effusions (left > right), left sided opacities (atelectasis vs atypical PNA) and worsening lymphangitic spread of disease  -blood cultures negative  - Per ID dc abx as cultures are negative. Pneumonia ruled out.   -Bronchodilators and cough expectorant as needed  -anxiolytics and analgesia as needed  -incentive spirometry  -Pulm following   -monitor respiratory status closely, prognosis guarded  - Onc consult appreciated.     Metastatic, Infiltrating Ductal Carcinoma of the Right Breast  -with liver and bone mets (pathologic, compression fractures of C3 and T2/T7)  -underwent immunotherapy on 12/2 and developed a hypersensitivity reaction  -was scheduled for chemo on 12/27 with Dr. Chapa ([facility]).   - s/p inpatient Chemo Herceptin and Docetaxel on 12/27.   - Hem/onc following    Anxiety  -on Xanax at home  -Ativan prn  -continue escitalopram which was recently started    Back Pain due to Pathologic Compression Fractures  -known C3/T2 compression fractures  -CT angio with new T7 compression fracture with mild retropulsion   -Obtain dedicated imaging of T-spine  -MRIs reviewed from November 2021  -ortho spine consulted (Follows with Dr. Santana as outpatient)  -f/u  CT and MRI spine pending.   -Continue Morphine ER 30 mg TID  -IV dilaudid for mod/severe pain as needed  -Continue muscle relaxer - on carisoprodol as needed    Moderate Pericardial Effusion  -likely malignant  -TNI negative; BNP within normal range  -patient reports effusion was small on recent TTE at [facility] on 12/12 or 12/13  -patient was seen by [facility] cardio at that time  - TTE - here - moderate pericardial effusion - possible early cardiac tamponade physiology - not a suitable candidate for pericardial window placement at this time per thoracic Sx.    - s/p pericardiocentesis on 12/28, 250cc serous fluid removed.  - will need repeat echo in 1 week    KURT, resolved  - encouraged hydration, Will hold off on IVFs for now to prevent reaccumulation of pleural effusion    Constipation, resolved  - senna and Miralax    DVT ppx Lovenox  Dispo: remains acute on 6L Nc and with chest tube.  [phone]

## 2024-08-16 ENCOUNTER — EMERGENCY (EMERGENCY)
Facility: HOSPITAL | Age: 39
LOS: 1 days | Discharge: HOME | End: 2024-08-16
Attending: EMERGENCY MEDICINE
Payer: MEDICARE

## 2024-08-16 VITALS
OXYGEN SATURATION: 100 % | HEART RATE: 71 BPM | DIASTOLIC BLOOD PRESSURE: 64 MMHG | TEMPERATURE: 98 F | RESPIRATION RATE: 18 BRPM | HEIGHT: 62 IN | WEIGHT: 136.91 LBS | SYSTOLIC BLOOD PRESSURE: 102 MMHG

## 2024-08-16 DIAGNOSIS — Z90.89 ACQUIRED ABSENCE OF OTHER ORGANS: Chronic | ICD-10-CM

## 2024-08-16 DIAGNOSIS — Z98.890 OTHER SPECIFIED POSTPROCEDURAL STATES: Chronic | ICD-10-CM

## 2024-08-16 LAB
ANION GAP SERPL CALC-SCNC: 9 MMOL/L — SIGNIFICANT CHANGE UP (ref 5–17)
BASOPHILS # BLD AUTO: 0 K/UL — SIGNIFICANT CHANGE UP (ref 0–0.2)
BASOPHILS NFR BLD AUTO: 0 % — SIGNIFICANT CHANGE UP (ref 0–2)
BUN SERPL-MCNC: 13 MG/DL — SIGNIFICANT CHANGE UP (ref 8–20)
CALCIUM SERPL-MCNC: 7.6 MG/DL — LOW (ref 8.4–10.5)
CHLORIDE SERPL-SCNC: 108 MMOL/L — SIGNIFICANT CHANGE UP (ref 96–108)
CO2 SERPL-SCNC: 23 MMOL/L — SIGNIFICANT CHANGE UP (ref 22–29)
CREAT SERPL-MCNC: 0.48 MG/DL — LOW (ref 0.5–1.3)
EGFR: 125 ML/MIN/1.73M2 — SIGNIFICANT CHANGE UP
EOSINOPHIL # BLD AUTO: 0.1 K/UL — SIGNIFICANT CHANGE UP (ref 0–0.5)
EOSINOPHIL NFR BLD AUTO: 2.7 % — SIGNIFICANT CHANGE UP (ref 0–6)
GLUCOSE SERPL-MCNC: 99 MG/DL — SIGNIFICANT CHANGE UP (ref 70–99)
HCT VFR BLD CALC: 22 % — LOW (ref 34.5–45)
HGB BLD-MCNC: 6.8 G/DL — CRITICAL LOW (ref 11.5–15.5)
LYMPHOCYTES # BLD AUTO: 0.55 K/UL — LOW (ref 1–3.3)
LYMPHOCYTES # BLD AUTO: 15.2 % — SIGNIFICANT CHANGE UP (ref 13–44)
MCHC RBC-ENTMCNC: 29.1 PG — SIGNIFICANT CHANGE UP (ref 27–34)
MCHC RBC-ENTMCNC: 30.9 GM/DL — LOW (ref 32–36)
MCV RBC AUTO: 94 FL — SIGNIFICANT CHANGE UP (ref 80–100)
MONOCYTES # BLD AUTO: 0.16 K/UL — SIGNIFICANT CHANGE UP (ref 0–0.9)
MONOCYTES NFR BLD AUTO: 4.4 % — SIGNIFICANT CHANGE UP (ref 2–14)
NEUTROPHILS # BLD AUTO: 2.82 K/UL — SIGNIFICANT CHANGE UP (ref 1.8–7.4)
NEUTROPHILS NFR BLD AUTO: 77.7 % — HIGH (ref 43–77)
PLATELET # BLD AUTO: 160 K/UL — SIGNIFICANT CHANGE UP (ref 150–400)
POTASSIUM SERPL-MCNC: 3.3 MMOL/L — LOW (ref 3.5–5.3)
POTASSIUM SERPL-SCNC: 3.3 MMOL/L — LOW (ref 3.5–5.3)
RBC # BLD: 2.34 M/UL — LOW (ref 3.8–5.2)
RBC # FLD: 16.6 % — HIGH (ref 10.3–14.5)
SODIUM SERPL-SCNC: 140 MMOL/L — SIGNIFICANT CHANGE UP (ref 135–145)
WBC # BLD: 3.63 K/UL — LOW (ref 3.8–10.5)
WBC # FLD AUTO: 3.63 K/UL — LOW (ref 3.8–10.5)

## 2024-08-16 PROCEDURE — 99223 1ST HOSP IP/OBS HIGH 75: CPT

## 2024-08-16 RX ORDER — DIPHENHYDRAMINE HCL 25 MG
25 CAPSULE ORAL ONCE
Refills: 0 | Status: COMPLETED | OUTPATIENT
Start: 2024-08-16 | End: 2024-08-16

## 2024-08-16 RX ORDER — POTASSIUM CHLORIDE 1500 MG/1
10 TABLET, EXTENDED RELEASE ORAL ONCE
Refills: 0 | Status: COMPLETED | OUTPATIENT
Start: 2024-08-16 | End: 2024-08-16

## 2024-08-16 RX ORDER — HYDROMORPHONE HCL IN 0.9% NACL 0.2 MG/ML
2 PLASTIC BAG, INJECTION (ML) INTRAVENOUS ONCE
Refills: 0 | Status: DISCONTINUED | OUTPATIENT
Start: 2024-08-16 | End: 2024-08-16

## 2024-08-16 RX ADMIN — Medication 2 MILLIGRAM(S): at 21:58

## 2024-08-16 RX ADMIN — Medication 2 MILLIGRAM(S): at 23:33

## 2024-08-16 RX ADMIN — POTASSIUM CHLORIDE 100 MILLIEQUIVALENT(S): 1500 TABLET, EXTENDED RELEASE ORAL at 23:33

## 2024-08-16 RX ADMIN — Medication 25 MILLIGRAM(S): at 21:58

## 2024-08-16 NOTE — ED ADULT TRIAGE NOTE - NS ED NURSE BANDS TYPE
Group Topic:  RADHA Process Group    Date: 4/7/2022  Start Time: 1000  End Time: 1145  Facilitators: Ngozi Jj LCSW    Focus: Morning check-in process group  Number in attendance: 6      Goals: Process group is structured time for Patients to engage in healthy self disclosure to increase problem solving skills while being open to constructive feedback from community. Topics and themes commonly discussed are relapse, coping skills, conflict, stress, gratitude, spirituality and addiction education oriented. While in process, the group facilitators ensure group safety and cohesion.     Patient/Resident Check-in Self report    Last self reported use: 3/16/22  Substance(s) of choice: Alcohol  Number of hours of sleep: 6.5  Difficulty falling Asleep: No  Difficulty staying asleep:No  Current dreams about consuming substances or night terrors: No    Patient reported appetite as: good  Number of meals in the last 24 hours: 3    My Mood is: \"optimistic\"    Depressive symptoms: Yes  If yes, (0=none, 10= worst) 2    Anxiety symptoms: Yes  If yes, (0=none, 10= worst) 4    Cravings: Yes  If yes, (0=none, 10= worst) 1    Attended a recovery meeting last night and/or this morning: No  Number of recovery meetings attended since Sunday: 2    Taking medications as prescribed: Yes    Feelings of hopelessness or helplessness: No  Thoughts of wising you were dead or would harm self: No  Thoughts of harming someone else: No    Recovery goal/task for the day: \"Following up with [employer]ML[employer], talking to case mngs\"    Preferred Level of Care: AODA (alcohol and other drug abuse) Partial Hospital Program (AODA PHP), AODA (alcohol and other drug abuse) Intensive Outpatient Program (AODA IOP); Outpatient AODA (alcohol and other drug abuse) treatment; and Community Resources and support groups     Aftercare Concerns: Yes- \"wife drinking at home\"    CLAUDIA Cano, APSW, SAC-IT  4/7/2022           Name band;

## 2024-08-16 NOTE — ED CDU PROVIDER INITIAL DAY NOTE - OBJECTIVE STATEMENT
Pt is a 37 yo  F here for low hgb.  PT with hx of Stage IV breast CA.  Pt had outpatient labs and was found to be anemic.  Pt with hx of recurrent anemia.

## 2024-08-16 NOTE — ED PROVIDER NOTE - OBJECTIVE STATEMENT
Pt is a 39 yo  F here for low hgb.  PT with hx of Stage IV breast CA.  Pt had outpatient labs and was found to be anemic.  Pt with hx of recurrent anemia.

## 2024-08-17 VITALS
HEART RATE: 60 BPM | TEMPERATURE: 98 F | DIASTOLIC BLOOD PRESSURE: 64 MMHG | OXYGEN SATURATION: 98 % | RESPIRATION RATE: 18 BRPM | SYSTOLIC BLOOD PRESSURE: 92 MMHG

## 2024-08-17 PROCEDURE — 96374 THER/PROPH/DIAG INJ IV PUSH: CPT

## 2024-08-17 PROCEDURE — 96375 TX/PRO/DX INJ NEW DRUG ADDON: CPT

## 2024-08-17 PROCEDURE — 99239 HOSP IP/OBS DSCHRG MGMT >30: CPT

## 2024-08-17 PROCEDURE — 36430 TRANSFUSION BLD/BLD COMPNT: CPT

## 2024-08-17 PROCEDURE — 80048 BASIC METABOLIC PNL TOTAL CA: CPT

## 2024-08-17 PROCEDURE — 86922 COMPATIBILITY TEST ANTIGLOB: CPT

## 2024-08-17 PROCEDURE — 86900 BLOOD TYPING SEROLOGIC ABO: CPT

## 2024-08-17 PROCEDURE — 36415 COLL VENOUS BLD VENIPUNCTURE: CPT

## 2024-08-17 PROCEDURE — 86850 RBC ANTIBODY SCREEN: CPT

## 2024-08-17 PROCEDURE — 96376 TX/PRO/DX INJ SAME DRUG ADON: CPT

## 2024-08-17 PROCEDURE — 99284 EMERGENCY DEPT VISIT MOD MDM: CPT | Mod: 25

## 2024-08-17 PROCEDURE — 86901 BLOOD TYPING SEROLOGIC RH(D): CPT

## 2024-08-17 PROCEDURE — 85025 COMPLETE CBC W/AUTO DIFF WBC: CPT

## 2024-08-17 PROCEDURE — G0378: CPT

## 2024-08-17 PROCEDURE — P9040: CPT

## 2024-08-17 RX ORDER — HYDROMORPHONE HCL IN 0.9% NACL 0.2 MG/ML
2 PLASTIC BAG, INJECTION (ML) INTRAVENOUS ONCE
Refills: 0 | Status: DISCONTINUED | OUTPATIENT
Start: 2024-08-17 | End: 2024-08-17

## 2024-08-17 RX ORDER — HEPARIN SODIUM,PORCINE 10 UNIT/ML
300 VIAL (ML) INTRAVENOUS ONCE
Refills: 0 | Status: COMPLETED | OUTPATIENT
Start: 2024-08-17 | End: 2024-08-17

## 2024-08-17 RX ORDER — MEMANTINE HYDROCHLORIDE 14 MG/1
10 CAPSULE, EXTENDED RELEASE ORAL DAILY
Refills: 0 | Status: ACTIVE | OUTPATIENT
Start: 2024-08-17 | End: 2025-07-16

## 2024-08-17 RX ORDER — FLUOXETINE HCL 10 MG
40 CAPSULE ORAL AT BEDTIME
Refills: 0 | Status: ACTIVE | OUTPATIENT
Start: 2024-08-17 | End: 2025-07-16

## 2024-08-17 RX ORDER — MEMANTINE HYDROCHLORIDE 14 MG/1
1 CAPSULE, EXTENDED RELEASE ORAL
Refills: 0 | DISCHARGE

## 2024-08-17 RX ORDER — FENTANYL CITRATE 1200 UG/1
100 LOZENGE ORAL; TRANSMUCOSAL ONCE
Refills: 0 | Status: DISCONTINUED | OUTPATIENT
Start: 2024-08-17 | End: 2024-08-17

## 2024-08-17 RX ORDER — FUROSEMIDE 10 MG/ML
20 INJECTION, SOLUTION INTRAVENOUS DAILY
Refills: 0 | Status: ACTIVE | OUTPATIENT
Start: 2024-08-17 | End: 2025-07-16

## 2024-08-17 RX ADMIN — FENTANYL CITRATE 100 MICROGRAM(S): 1200 LOZENGE ORAL; TRANSMUCOSAL at 04:30

## 2024-08-17 RX ADMIN — Medication 2 MILLIGRAM(S): at 09:32

## 2024-08-17 RX ADMIN — Medication 2 MILLIGRAM(S): at 00:07

## 2024-08-17 RX ADMIN — FUROSEMIDE 20 MILLIGRAM(S): 10 INJECTION, SOLUTION INTRAVENOUS at 06:22

## 2024-08-17 RX ADMIN — Medication 200 MILLIGRAM(S): at 06:22

## 2024-08-17 RX ADMIN — Medication 300 UNIT(S): at 10:03

## 2024-08-17 NOTE — ED CDU PROVIDER DISPOSITION NOTE - NSFOLLOWUPINSTRUCTIONS_ED_ALL_ED_FT
- Return to the ED for any new or worsening symptoms.   - Follow-up with your oncologist Monday as scheduled    Anemia    Anemia is a condition in which the concentration of red blood cells or hemoglobin in the blood is below normal. Hemoglobin is a substance in red blood cells that carries oxygen to the tissues of the body. Anemia results in not enough oxygen reaching these tissues which can cause symptoms such as weakness, dizziness/lightheadedness, shortness of breath, chest pain, paleness, or nausea. The cause of your anemia may or may not be determined immediately. If your hemoglobin was dangerously low, you may have received a blood transfusion. Usually reactions to transfusions occur immediately but monitor yourself for any fevers, rash, or shortness of breath.    SEEK IMMEDIATE MEDICAL CARE IF YOU HAVE ANY OF THE FOLLOWING SYMPTOMS: extreme weakness/chest pain/shortness of breath, black or bloody stools, vomiting blood, fainting, fever, or any signs of dehydration.

## 2024-08-17 NOTE — ED ADULT NURSE NOTE - TEMPLATE
General Airway patent, Nasal mucosa clear. Mouth with normal mucosa. Throat has no vesicles, no oropharyngeal exudates and uvula is midline.

## 2024-08-17 NOTE — ED ADULT NURSE REASSESSMENT NOTE - NS ED NURSE REASSESS COMMENT FT1
Pt care assumed @ this time. Pt resting in bed. VSS. Resp even and unlabored. Blood transfusion complete. Awaiting further plan of care at this time. Pt updated on plan of care and verbalizes understanding.

## 2024-08-17 NOTE — ED CDU PROVIDER DISPOSITION NOTE - ATTENDING CONTRIBUTION TO CARE
I, Juan Manuel Allison, performed the initial face to face bedside interview with this patient regarding history of present illness, review of symptoms and relevant past medical, social and family history.  I completed an independent physical examination.  I was the initial provider who evaluated this patient. I have signed out the follow up of any pending tests (i.e. labs, radiological studies) to the ACP.  I have communicated the patient’s plan of care and disposition with the ACP.

## 2024-08-17 NOTE — ED ADULT NURSE NOTE - OBJECTIVE STATEMENT
[facility], pt complaining of bilateral lower leg swelling that started yesterday. patient denies nausea, vomiting, dizziness, sob or chest pain.

## 2024-08-17 NOTE — ED CDU PROVIDER DISPOSITION NOTE - CLINICAL COURSE
37yo F PMHx stage IV breast ca presented to ED for anemia on outpt labs. Hgb in ED 6.8, also hypokalemic K 3.3. Pt placed in obs for blood transfusion of 2u PRBC. Received 1 K rider to supplement potassium and 2 units PRBC. Pt tolerated transfusion well, no complications. Vitals stable. Pt has f/u with oncologist on Monday. all results and follow-up plan reviewed. provided copy of all results. return precautions discussed.

## 2024-08-17 NOTE — ED CDU PROVIDER SUBSEQUENT DAY NOTE - HISTORY
Pt resting comfortably at time of re-assessment. No events overnight. pt receiving PRBC. Will continue to monitor.

## 2024-08-17 NOTE — ED CDU PROVIDER SUBSEQUENT DAY NOTE - CLINICAL SUMMARY MEDICAL DECISION MAKING FREE TEXT BOX
37yo F pmh metastatic stage 4 breast ca p/w anemia. hgb found to be 6.8, also hypokalemic. pt also c/o generalized body aches and bone pain. pt placed in obs for pain control and to receive 2 units PRBCs.

## 2024-08-17 NOTE — ED CDU PROVIDER DISPOSITION NOTE - PATIENT PORTAL LINK FT
You can access the FollowMyHealth Patient Portal offered by [facility] by registering at the following website: [email or web address]. By joining [employer]’s FollowMyHealth portal, you will also be able to view your health information using other applications (apps) compatible with our system.

## 2024-09-03 ENCOUNTER — INPATIENT (INPATIENT)
Facility: HOSPITAL | Age: 39
LOS: 4 days | Discharge: HOME | DRG: 812 | End: 2024-09-08
Attending: STUDENT IN AN ORGANIZED HEALTH CARE EDUCATION/TRAINING PROGRAM | Admitting: HOSPITALIST
Payer: MEDICARE

## 2024-09-03 VITALS
OXYGEN SATURATION: 98 % | HEART RATE: 74 BPM | HEIGHT: 62 IN | WEIGHT: 134.48 LBS | RESPIRATION RATE: 18 BRPM | TEMPERATURE: 99 F | SYSTOLIC BLOOD PRESSURE: 97 MMHG | DIASTOLIC BLOOD PRESSURE: 63 MMHG

## 2024-09-03 DIAGNOSIS — Z90.89 ACQUIRED ABSENCE OF OTHER ORGANS: Chronic | ICD-10-CM

## 2024-09-03 DIAGNOSIS — D64.9 ANEMIA, UNSPECIFIED: ICD-10-CM

## 2024-09-03 DIAGNOSIS — Z98.890 OTHER SPECIFIED POSTPROCEDURAL STATES: Chronic | ICD-10-CM

## 2024-09-03 LAB
ALBUMIN SERPL ELPH-MCNC: 2.9 G/DL — LOW (ref 3.3–5.2)
ALP SERPL-CCNC: 241 U/L — HIGH (ref 40–120)
ALT FLD-CCNC: 23 U/L — SIGNIFICANT CHANGE UP
ANION GAP SERPL CALC-SCNC: 10 MMOL/L — SIGNIFICANT CHANGE UP (ref 5–17)
ANISOCYTOSIS BLD QL: SLIGHT — SIGNIFICANT CHANGE UP
APTT BLD: 121.6 SEC — CRITICAL HIGH (ref 24.5–35.6)
AST SERPL-CCNC: 42 U/L — HIGH
BASOPHILS # BLD AUTO: 0.03 K/UL — SIGNIFICANT CHANGE UP (ref 0–0.2)
BASOPHILS NFR BLD AUTO: 0.9 % — SIGNIFICANT CHANGE UP (ref 0–2)
BILIRUB SERPL-MCNC: 0.8 MG/DL — SIGNIFICANT CHANGE UP (ref 0.4–2)
BLD GP AB SCN SERPL QL: SIGNIFICANT CHANGE UP
BUN SERPL-MCNC: 10 MG/DL — SIGNIFICANT CHANGE UP (ref 8–20)
BURR CELLS BLD QL SMEAR: PRESENT — SIGNIFICANT CHANGE UP
CALCIUM SERPL-MCNC: 8.2 MG/DL — LOW (ref 8.4–10.5)
CHLORIDE SERPL-SCNC: 102 MMOL/L — SIGNIFICANT CHANGE UP (ref 96–108)
CO2 SERPL-SCNC: 25 MMOL/L — SIGNIFICANT CHANGE UP (ref 22–29)
CREAT SERPL-MCNC: 0.4 MG/DL — LOW (ref 0.5–1.3)
DACRYOCYTES BLD QL SMEAR: SLIGHT — SIGNIFICANT CHANGE UP
EGFR: 131 ML/MIN/1.73M2 — SIGNIFICANT CHANGE UP
EOSINOPHIL # BLD AUTO: 0.07 K/UL — SIGNIFICANT CHANGE UP (ref 0–0.5)
EOSINOPHIL NFR BLD AUTO: 2.6 % — SIGNIFICANT CHANGE UP (ref 0–6)
GIANT PLATELETS BLD QL SMEAR: PRESENT — SIGNIFICANT CHANGE UP
GLUCOSE SERPL-MCNC: 94 MG/DL — SIGNIFICANT CHANGE UP (ref 70–99)
HCG SERPL-ACNC: <4 MIU/ML — SIGNIFICANT CHANGE UP
HCT VFR BLD CALC: 19.7 % — CRITICAL LOW (ref 34.5–45)
HGB BLD-MCNC: 5.9 G/DL — CRITICAL LOW (ref 11.5–15.5)
HYPOCHROMIA BLD QL: SIGNIFICANT CHANGE UP
INR BLD: 1.19 RATIO — HIGH (ref 0.85–1.18)
LYMPHOCYTES # BLD AUTO: 0.53 K/UL — LOW (ref 1–3.3)
LYMPHOCYTES # BLD AUTO: 18.4 % — SIGNIFICANT CHANGE UP (ref 13–44)
MACROCYTES BLD QL: SLIGHT — SIGNIFICANT CHANGE UP
MANUAL SMEAR VERIFICATION: SIGNIFICANT CHANGE UP
MCHC RBC-ENTMCNC: 29.8 PG — SIGNIFICANT CHANGE UP (ref 27–34)
MCHC RBC-ENTMCNC: 29.9 GM/DL — LOW (ref 32–36)
MCV RBC AUTO: 99.5 FL — SIGNIFICANT CHANGE UP (ref 80–100)
MICROCYTES BLD QL: SLIGHT — SIGNIFICANT CHANGE UP
MONOCYTES # BLD AUTO: 0.1 K/UL — SIGNIFICANT CHANGE UP (ref 0–0.9)
MONOCYTES NFR BLD AUTO: 3.5 % — SIGNIFICANT CHANGE UP (ref 2–14)
NEUTROPHILS # BLD AUTO: 2.13 K/UL — SIGNIFICANT CHANGE UP (ref 1.8–7.4)
NEUTROPHILS NFR BLD AUTO: 74.6 % — SIGNIFICANT CHANGE UP (ref 43–77)
NRBC # BLD: 2 /100 WBCS — HIGH (ref 0–0)
OB PNL STL: POSITIVE
PLAT MORPH BLD: NORMAL — SIGNIFICANT CHANGE UP
PLATELET # BLD AUTO: 155 K/UL — SIGNIFICANT CHANGE UP (ref 150–400)
POIKILOCYTOSIS BLD QL AUTO: SLIGHT — SIGNIFICANT CHANGE UP
POLYCHROMASIA BLD QL SMEAR: SIGNIFICANT CHANGE UP
POTASSIUM SERPL-MCNC: 4 MMOL/L — SIGNIFICANT CHANGE UP (ref 3.5–5.3)
POTASSIUM SERPL-SCNC: 4 MMOL/L — SIGNIFICANT CHANGE UP (ref 3.5–5.3)
PROT SERPL-MCNC: 5.1 G/DL — LOW (ref 6.6–8.7)
PROTHROM AB SERPL-ACNC: 13.1 SEC — HIGH (ref 9.5–13)
RBC # BLD: 1.98 M/UL — LOW (ref 3.8–5.2)
RBC # FLD: 18.8 % — HIGH (ref 10.3–14.5)
RBC BLD AUTO: ABNORMAL
SCHISTOCYTES BLD QL AUTO: SLIGHT — SIGNIFICANT CHANGE UP
SODIUM SERPL-SCNC: 137 MMOL/L — SIGNIFICANT CHANGE UP (ref 135–145)
TARGETS BLD QL SMEAR: SLIGHT — SIGNIFICANT CHANGE UP
WBC # BLD: 2.86 K/UL — LOW (ref 3.8–10.5)
WBC # FLD AUTO: 2.86 K/UL — LOW (ref 3.8–10.5)

## 2024-09-03 PROCEDURE — 99285 EMERGENCY DEPT VISIT HI MDM: CPT

## 2024-09-03 PROCEDURE — 99223 1ST HOSP IP/OBS HIGH 75: CPT

## 2024-09-03 RX ORDER — HYDROMORPHONE HYDROCHLORIDE 2 MG/1
1 TABLET ORAL ONCE
Refills: 0 | Status: DISCONTINUED | OUTPATIENT
Start: 2024-09-03 | End: 2024-09-03

## 2024-09-03 RX ORDER — HYDROMORPHONE HYDROCHLORIDE 2 MG/1
2 TABLET ORAL ONCE
Refills: 0 | Status: DISCONTINUED | OUTPATIENT
Start: 2024-09-03 | End: 2024-09-03

## 2024-09-03 RX ORDER — ONDANSETRON 2 MG/ML
4 INJECTION, SOLUTION INTRAMUSCULAR; INTRAVENOUS ONCE
Refills: 0 | Status: COMPLETED | OUTPATIENT
Start: 2024-09-03 | End: 2024-09-03

## 2024-09-03 RX ORDER — PANTOPRAZOLE SODIUM 40 MG
80 TABLET, DELAYED RELEASE (ENTERIC COATED) ORAL ONCE
Refills: 0 | Status: COMPLETED | OUTPATIENT
Start: 2024-09-03 | End: 2024-09-03

## 2024-09-03 RX ORDER — HYDROMORPHONE HYDROCHLORIDE 2 MG/1
1 TABLET ORAL EVERY 4 HOURS
Refills: 0 | Status: DISCONTINUED | OUTPATIENT
Start: 2024-09-03 | End: 2024-09-04

## 2024-09-03 RX ORDER — PANTOPRAZOLE SODIUM 40 MG
40 TABLET, DELAYED RELEASE (ENTERIC COATED) ORAL EVERY 12 HOURS
Refills: 0 | Status: DISCONTINUED | OUTPATIENT
Start: 2024-09-03 | End: 2024-09-08

## 2024-09-03 RX ORDER — ONDANSETRON 2 MG/ML
4 INJECTION, SOLUTION INTRAMUSCULAR; INTRAVENOUS EVERY 8 HOURS
Refills: 0 | Status: DISCONTINUED | OUTPATIENT
Start: 2024-09-03 | End: 2024-09-08

## 2024-09-03 RX ORDER — HYDROMORPHONE HYDROCHLORIDE 2 MG/1
0.5 TABLET ORAL EVERY 6 HOURS
Refills: 0 | Status: DISCONTINUED | OUTPATIENT
Start: 2024-09-03 | End: 2024-09-04

## 2024-09-03 RX ADMIN — HYDROMORPHONE HYDROCHLORIDE 1 MILLIGRAM(S): 2 TABLET ORAL at 23:49

## 2024-09-03 RX ADMIN — HYDROMORPHONE HYDROCHLORIDE 1 MILLIGRAM(S): 2 TABLET ORAL at 18:09

## 2024-09-03 RX ADMIN — ONDANSETRON 4 MILLIGRAM(S): 2 INJECTION, SOLUTION INTRAMUSCULAR; INTRAVENOUS at 17:48

## 2024-09-03 RX ADMIN — Medication 80 MILLIGRAM(S): at 19:13

## 2024-09-03 RX ADMIN — HYDROMORPHONE HYDROCHLORIDE 2 MILLIGRAM(S): 2 TABLET ORAL at 19:13

## 2024-09-03 RX ADMIN — HYDROMORPHONE HYDROCHLORIDE 1 MILLIGRAM(S): 2 TABLET ORAL at 17:48

## 2024-09-03 NOTE — ED PROVIDER NOTE - CLINICAL SUMMARY MEDICAL DECISION MAKING FREE TEXT BOX
38 year old female with hx of stage IV breast cancer with mets to the brain presents from oncologist with hemoglobin of 5.9. Guaiac positive. Transfusing 2 units of blood, GI consulted for possible cauterization.

## 2024-09-03 NOTE — ED PROVIDER NOTE - OBJECTIVE STATEMENT
38 year old female with hx of stage IV breast cancer with mets to the brain presents from oncologist with hemoglobin of 5.9. Pt reports she has had bleeding per rectum for a few weeks now, which have required cauterization intervention in the past. Complaining of abdominal pain, denies chest pain, sob, fever, dysuria, vomiting, or any other complaints at this time

## 2024-09-03 NOTE — H&P ADULT - ASSESSMENT
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     Acute on chronic blood loss Anemia 37yo F with PMHx of Stage 4 Breast Cancer s/p Taxotere/Herceptin with brain mets S/P WBRT now off Enhertu (Last Dose on 6/19), Chronic Iron Deficiency Anemia 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     Acute on chronic blood loss Anemia  -with hx of GAVE recent admission s/p EGD with cauterization.   -H/H 5.9/19.7, FOBT positive   -ordered for 2u PRBC   -check post transfusion H/H, transfuse if less than 7 or symptomatic  -received 80mg IV protonix in the ED   -cont with Protonix 40mg IV BID  -NPO for now   -GI consulted      Metastatic Breast Cancer   -off Enhertu (last dose 6/19)  - Hem/ Onc following  -Outpatient follow up with Rad / Onc  -Pain control with Dilaudid po and IV, pt reports that PO Dilaudid ineffective  -pt was on PCA pump on prior admission     -Valium prn for muscle spasm and anxiety  -cont home dose of methadone   -will likely need pain management consult      Anxiety/Mood Disorder  - Continue Fluoxetine, Olanzapine and Diazepam (PRN)    DVT PPx  -SCDs   Dispo: clinically active, waiting GI evaluation       Time-based billing (NON-critical care).     78 minutes spent on total encounter. The necessity of the time spent during the encounter on this date of service was due to:   chart review, patient evaluation, independent history taking, documentation, coordination of care and discussion with patient,  and nurse

## 2024-09-03 NOTE — H&P ADULT - NSHPPHYSICALEXAM_GEN_ALL_CORE
T(C): 37 (09-03-24 @ 23:25), Max: 37.1 (09-03-24 @ 16:16)  HR: 66 (09-03-24 @ 23:25) (65 - 74)  BP: 89/50 (09-03-24 @ 23:25) (85/52 - 105/54)  RR: 17 (09-03-24 @ 23:25) (14 - 18)  SpO2: 96% (09-03-24 @ 23:25) (96% - 98%)    GENERAL: patient appears well, no acute distress, appropriate, pleasant  EYES: sclera clear, no exudates  ENMT: oropharynx clear without erythema, no exudates, moist mucous membranes  NECK: supple, soft, no thyromegaly noted  LUNGS: good air entry bilaterally, clear to auscultation, symmetric breath sounds, no wheezing or rhonchi appreciated  HEART: soft S1/S2, regular rate and rhythm, no murmurs noted, no lower extremity edema  GASTROINTESTINAL: abdomen is soft, nontender, nondistended, normoactive bowel sounds, no palpable masses  INTEGUMENT: good skin turgor, warm skin, appears well perfused  MUSCULOSKELETAL: no clubbing or cyanosis, no obvious deformity  NEUROLOGIC: awake, alert, oriented x3, good muscle tone in 4 extremities, no obvious sensory deficits  PSYCHIATRIC: mood is good, affect is congruent, linear and logical thought process  HEME/LYMPH: no palpable supraclavicular nodules, no obvious ecchymosis or petechiae

## 2024-09-03 NOTE — ED PROVIDER NOTE - ATTENDING CONTRIBUTION TO CARE
acute blood loss anemia; chronic metastatic CA; on exam, +pallor; NAD; no icterus; normal heart and lung sounds; abd soft nt nd; rectal exam per Resident physician, reports maroon stool; hx of GAVE requiring endoscropic cautery in past; occult blood pos today; will start PPI and transfuse, plan to admit    I personally saw the patient with the resident, and completed the key components of the history and physical exam. I then discussed the management plan with the resident.

## 2024-09-03 NOTE — ED PROVIDER NOTE - PHYSICAL EXAMINATION
Gen: pale appearing  Head: NC/AT  Neck: trachea midline  Card: regular rate and rhythm  Resp:  CTAB  Abd: soft, non-distended, nontender  Ext: no deformities above reported baseline  Neuro:  A&O, no motor or sensory deficits above reported baseline  Skin:  Warm and dry as visualized  Psych:  Normal affect and mood

## 2024-09-03 NOTE — ED ADULT NURSE REASSESSMENT NOTE - NS ED NURSE REASSESS COMMENT FT1
pt states no relief s/p med regimen. md aware, will order addtl meds
report received from RN Lombardi, pt awaiting bed report and transport upstairs, showing no signs of distress at this time,

## 2024-09-03 NOTE — H&P ADULT - HISTORY OF PRESENT ILLNESS
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 Pt reports she has had rectal bleeding for a few weeks since her discharged from the hospital. She reports to feeling very tired reason went to her oncologist who check her hemoglobin. She states that she been off chemo since June due to anemia. Denies cp, sob, palpitations.

## 2024-09-04 LAB
ANION GAP SERPL CALC-SCNC: 11 MMOL/L — SIGNIFICANT CHANGE UP (ref 5–17)
BUN SERPL-MCNC: 12.9 MG/DL — SIGNIFICANT CHANGE UP (ref 8–20)
CALCIUM SERPL-MCNC: 8.4 MG/DL — SIGNIFICANT CHANGE UP (ref 8.4–10.5)
CHLORIDE SERPL-SCNC: 106 MMOL/L — SIGNIFICANT CHANGE UP (ref 96–108)
CO2 SERPL-SCNC: 24 MMOL/L — SIGNIFICANT CHANGE UP (ref 22–29)
CREAT SERPL-MCNC: 0.52 MG/DL — SIGNIFICANT CHANGE UP (ref 0.5–1.3)
EGFR: 123 ML/MIN/1.73M2 — SIGNIFICANT CHANGE UP
GLUCOSE SERPL-MCNC: 102 MG/DL — HIGH (ref 70–99)
HCT VFR BLD CALC: 27.4 % — LOW (ref 34.5–45)
HCT VFR BLD CALC: 27.5 % — LOW (ref 34.5–45)
HGB BLD-MCNC: 8.6 G/DL — LOW (ref 11.5–15.5)
HGB BLD-MCNC: 8.6 G/DL — LOW (ref 11.5–15.5)
MCHC RBC-ENTMCNC: 29.8 PG — SIGNIFICANT CHANGE UP (ref 27–34)
MCHC RBC-ENTMCNC: 30 PG — SIGNIFICANT CHANGE UP (ref 27–34)
MCHC RBC-ENTMCNC: 31.3 GM/DL — LOW (ref 32–36)
MCHC RBC-ENTMCNC: 31.4 GM/DL — LOW (ref 32–36)
MCV RBC AUTO: 94.8 FL — SIGNIFICANT CHANGE UP (ref 80–100)
MCV RBC AUTO: 95.8 FL — SIGNIFICANT CHANGE UP (ref 80–100)
PLATELET # BLD AUTO: 154 K/UL — SIGNIFICANT CHANGE UP (ref 150–400)
PLATELET # BLD AUTO: 159 K/UL — SIGNIFICANT CHANGE UP (ref 150–400)
POTASSIUM SERPL-MCNC: 3.9 MMOL/L — SIGNIFICANT CHANGE UP (ref 3.5–5.3)
POTASSIUM SERPL-SCNC: 3.9 MMOL/L — SIGNIFICANT CHANGE UP (ref 3.5–5.3)
RBC # BLD: 2.87 M/UL — LOW (ref 3.8–5.2)
RBC # BLD: 2.89 M/UL — LOW (ref 3.8–5.2)
RBC # FLD: 18.7 % — HIGH (ref 10.3–14.5)
RBC # FLD: 19.4 % — HIGH (ref 10.3–14.5)
SODIUM SERPL-SCNC: 141 MMOL/L — SIGNIFICANT CHANGE UP (ref 135–145)
WBC # BLD: 2.87 K/UL — LOW (ref 3.8–10.5)
WBC # BLD: 2.96 K/UL — LOW (ref 3.8–10.5)
WBC # FLD AUTO: 2.87 K/UL — LOW (ref 3.8–10.5)
WBC # FLD AUTO: 2.96 K/UL — LOW (ref 3.8–10.5)

## 2024-09-04 PROCEDURE — 99233 SBSQ HOSP IP/OBS HIGH 50: CPT

## 2024-09-04 PROCEDURE — 99222 1ST HOSP IP/OBS MODERATE 55: CPT

## 2024-09-04 RX ORDER — DIAZEPAM 10 MG
2 TABLET ORAL
Refills: 0 | Status: DISCONTINUED | OUTPATIENT
Start: 2024-09-04 | End: 2024-09-08

## 2024-09-04 RX ORDER — HYDROMORPHONE HYDROCHLORIDE 2 MG/1
30 TABLET ORAL
Refills: 0 | Status: DISCONTINUED | OUTPATIENT
Start: 2024-09-04 | End: 2024-09-08

## 2024-09-04 RX ORDER — LACTULOSE 10 G
20 PACKET (EA) ORAL THREE TIMES A DAY
Refills: 0 | Status: DISCONTINUED | OUTPATIENT
Start: 2024-09-04 | End: 2024-09-05

## 2024-09-04 RX ORDER — FLUOXETINE HCL 20 MG/5 ML
40 SOLUTION, ORAL ORAL AT BEDTIME
Refills: 0 | Status: DISCONTINUED | OUTPATIENT
Start: 2024-09-04 | End: 2024-09-08

## 2024-09-04 RX ORDER — HYDROCORTISONE 1 %
1 OINTMENT (GRAM) TOPICAL AT BEDTIME
Refills: 0 | Status: DISCONTINUED | OUTPATIENT
Start: 2024-09-04 | End: 2024-09-08

## 2024-09-04 RX ORDER — DIAZEPAM 10 MG
1 TABLET ORAL
Refills: 0 | DISCHARGE

## 2024-09-04 RX ORDER — METHADONE HYDROCHLORIDE 1 G/G
3 POWDER ORAL
Refills: 0 | DISCHARGE

## 2024-09-04 RX ORDER — PREGABALIN 75 MG/1
200 CAPSULE ORAL THREE TIMES A DAY
Refills: 0 | Status: DISCONTINUED | OUTPATIENT
Start: 2024-09-04 | End: 2024-09-08

## 2024-09-04 RX ORDER — MEMANTINE 7 MG/1
10 CAPSULE, EXTENDED RELEASE ORAL
Refills: 0 | Status: DISCONTINUED | OUTPATIENT
Start: 2024-09-04 | End: 2024-09-08

## 2024-09-04 RX ORDER — METHADONE HYDROCHLORIDE 1 G/G
10 POWDER ORAL
Refills: 0 | Status: DISCONTINUED | OUTPATIENT
Start: 2024-09-04 | End: 2024-09-08

## 2024-09-04 RX ORDER — OLANZAPINE 7.5 MG/1
2.5 TABLET ORAL AT BEDTIME
Refills: 0 | Status: DISCONTINUED | OUTPATIENT
Start: 2024-09-04 | End: 2024-09-08

## 2024-09-04 RX ORDER — CHLORHEXIDINE GLUCONATE 40 MG/ML
1 SOLUTION TOPICAL DAILY
Refills: 0 | Status: DISCONTINUED | OUTPATIENT
Start: 2024-09-04 | End: 2024-09-08

## 2024-09-04 RX ORDER — METHADONE HYDROCHLORIDE 1 G/G
15 POWDER ORAL AT BEDTIME
Refills: 0 | Status: DISCONTINUED | OUTPATIENT
Start: 2024-09-04 | End: 2024-09-08

## 2024-09-04 RX ORDER — HYDROMORPHONE HYDROCHLORIDE 2 MG/1
4 TABLET ORAL THREE TIMES A DAY
Refills: 0 | Status: DISCONTINUED | OUTPATIENT
Start: 2024-09-04 | End: 2024-09-04

## 2024-09-04 RX ORDER — NALOXONE HCL 1 MG/ML
0.1 VIAL (ML) INJECTION
Refills: 0 | Status: DISCONTINUED | OUTPATIENT
Start: 2024-09-04 | End: 2024-09-08

## 2024-09-04 RX ORDER — HYDROMORPHONE HYDROCHLORIDE 2 MG/1
1 TABLET ORAL
Refills: 0 | DISCHARGE

## 2024-09-04 RX ADMIN — Medication 40 MILLIGRAM(S): at 22:57

## 2024-09-04 RX ADMIN — HYDROMORPHONE HYDROCHLORIDE 1 MILLIGRAM(S): 2 TABLET ORAL at 09:08

## 2024-09-04 RX ADMIN — Medication 1 SUPPOSITORY(S): at 22:57

## 2024-09-04 RX ADMIN — MEMANTINE 10 MILLIGRAM(S): 7 CAPSULE, EXTENDED RELEASE ORAL at 17:10

## 2024-09-04 RX ADMIN — HYDROMORPHONE HYDROCHLORIDE 4 MILLIGRAM(S): 2 TABLET ORAL at 05:27

## 2024-09-04 RX ADMIN — OLANZAPINE 2.5 MILLIGRAM(S): 7.5 TABLET ORAL at 22:57

## 2024-09-04 RX ADMIN — HYDROMORPHONE HYDROCHLORIDE 1 MILLIGRAM(S): 2 TABLET ORAL at 03:54

## 2024-09-04 RX ADMIN — Medication 20 GRAM(S): at 13:15

## 2024-09-04 RX ADMIN — CHLORHEXIDINE GLUCONATE 1 APPLICATION(S): 40 SOLUTION TOPICAL at 14:05

## 2024-09-04 RX ADMIN — PREGABALIN 200 MILLIGRAM(S): 75 CAPSULE ORAL at 13:15

## 2024-09-04 RX ADMIN — HYDROMORPHONE HYDROCHLORIDE 30 MILLILITER(S): 2 TABLET ORAL at 16:34

## 2024-09-04 RX ADMIN — Medication 20 GRAM(S): at 05:28

## 2024-09-04 RX ADMIN — METHADONE HYDROCHLORIDE 10 MILLIGRAM(S): 1 POWDER ORAL at 13:15

## 2024-09-04 RX ADMIN — HYDROMORPHONE HYDROCHLORIDE 1 MILLIGRAM(S): 2 TABLET ORAL at 12:09

## 2024-09-04 RX ADMIN — METHADONE HYDROCHLORIDE 10 MILLIGRAM(S): 1 POWDER ORAL at 08:08

## 2024-09-04 RX ADMIN — PREGABALIN 200 MILLIGRAM(S): 75 CAPSULE ORAL at 23:00

## 2024-09-04 RX ADMIN — Medication 40 MILLIGRAM(S): at 17:10

## 2024-09-04 RX ADMIN — HYDROMORPHONE HYDROCHLORIDE 1 MILLIGRAM(S): 2 TABLET ORAL at 13:09

## 2024-09-04 RX ADMIN — MEMANTINE 10 MILLIGRAM(S): 7 CAPSULE, EXTENDED RELEASE ORAL at 05:28

## 2024-09-04 RX ADMIN — HYDROMORPHONE HYDROCHLORIDE 1 MILLIGRAM(S): 2 TABLET ORAL at 08:08

## 2024-09-04 RX ADMIN — Medication 40 MILLIGRAM(S): at 05:27

## 2024-09-04 RX ADMIN — PREGABALIN 200 MILLIGRAM(S): 75 CAPSULE ORAL at 05:28

## 2024-09-04 RX ADMIN — HYDROMORPHONE HYDROCHLORIDE 30 MILLILITER(S): 2 TABLET ORAL at 20:10

## 2024-09-04 RX ADMIN — METHADONE HYDROCHLORIDE 15 MILLIGRAM(S): 1 POWDER ORAL at 23:00

## 2024-09-04 NOTE — PROGRESS NOTE ADULT - SUBJECTIVE AND OBJECTIVE BOX
SUBJECTIVE:    Chief Complaint: Patient is a 38y old  Female who presents with a chief complaint of Acute on chronic blood loss Anemia (03 Sep 2024 20:01)      BRIEF HOSPITAL COURSE: 37yo F with PMHx of Stage 4 Breast Cancer s/p Taxotere/Herceptin with brain mets S/P WBRT now off Enhertu (Last Dose on 6/19), Chronic Iron Deficiency Anemia 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 Pt reports she has had rectal bleeding for a few weeks since her discharged from the hospital. She reports to feeling very tired reason went to her oncologist who check her hemoglobin. She states that she been off chemo since June due to anemia. Denies cp, sob, palpitations. Pt received 2 units pRBCc 9/3, GI was consulted. Last EGD was 07/31 with two bleeding erosions that were cauterized.     INTERVAL HPI/LAST 24HRS EVENTS: Vitals remained stable overnight. No acute events overnight. Patient seen and examined at bedside this morning. She endorsed diffuse abdominal pain and lower back pain, unchanged from yesterday. Stated that her current IV Dilaudid dosage is not enough for pain control and the PO dose doesn't work as well for her; she was unable to sleep due to the pain. She continues to endorse rectal bleeding, last noticed yesterday. She stated that this is a recurrent issue for her where she bleeds per rectum for one week at a time and it tapers off. She stated that her stool was melanotic, with strong foul odor. Pt confirms feeling nauseous this morning. Denies any chest pain, palpitations, orthopnea, leg edema, hematemesis, or any other complaints.     MEDICATIONS  (STANDING):  FLUoxetine 40 milliGRAM(s) Oral at bedtime  lactulose Syrup 20 Gram(s) Oral three times a day  memantine 10 milliGRAM(s) Oral two times a day  methadone    Tablet 15 milliGRAM(s) Oral at bedtime  methadone    Tablet 10 milliGRAM(s) Oral <User Schedule>  OLANZapine 2.5 milliGRAM(s) Oral at bedtime  pantoprazole  Injectable 40 milliGRAM(s) IV Push every 12 hours  pregabalin 200 milliGRAM(s) Oral three times a day    MEDICATIONS  (PRN):  diazepam    Tablet 2 milliGRAM(s) Oral two times a day PRN for muscle spasm, anxiety  HYDROmorphone   Tablet 4 milliGRAM(s) Oral three times a day PRN Moderate Pain (4 - 6)  HYDROmorphone  Injectable 1 milliGRAM(s) IV Push every 4 hours PRN Severe Pain (7 - 10)  ondansetron Injectable 4 milliGRAM(s) IV Push every 8 hours PRN Nausea and/or Vomiting      Allergies    pertuzumab (Other (Severe))  Perjeta (Other (Severe))    Intolerances x        REVIEW OF SYSTEMS: see interval hpi.       OBJECTIVE:    Vital Signs Last 24 Hrs  T(C): 36.9 (04 Sep 2024 08:46), Max: 37.1 (03 Sep 2024 16:16)  T(F): 98.5 (04 Sep 2024 08:46), Max: 98.7 (03 Sep 2024 16:16)  HR: 81 (04 Sep 2024 08:46) (65 - 81)  BP: 98/64 (04 Sep 2024 08:46) (85/52 - 105/54)  BP(mean): 68 (03 Sep 2024 19:11) (68 - 68)  RR: 18 (04 Sep 2024 08:46) (14 - 18)  SpO2: 95% (04 Sep 2024 08:46) (95% - 99%)    Parameters below as of 04 Sep 2024 08:46  Patient On (Oxygen Delivery Method): room air        PHYSICAL EXAM:  GENERAL: patient appears well, no acute distress, appropriate, pleasant  EYES: sclera clear, no exudates  ENMT: oropharynx clear without erythema, no exudates, moist mucous membranes  NECK: supple, soft, no thyromegaly noted  LUNGS: good air entry bilaterally, clear to auscultation, symmetric breath sounds, no wheezing or rhonchi appreciated  HEART: soft S1/S2, regular rate and rhythm, no murmurs noted, no lower extremity edema  GASTROINTESTINAL: abdomen is soft, nontender, nondistended, normoactive bowel sounds, no palpable masses  INTEGUMENT: good skin turgor, warm skin, appears well perfused  MUSCULOSKELETAL: no clubbing or cyanosis, no obvious deformity  NEUROLOGIC: awake, alert, oriented x3, good muscle tone in 4 extremities, no obvious sensory deficits  PSYCHIATRIC: mood is good, affect is congruent, linear and logical thought process  HEME/LYMPH: no palpable supraclavicular nodules, no obvious ecchymosis or petechiae      Lab/ Imaging:    LABS:                        8.6    2.87  )-----------( 154      ( 04 Sep 2024 06:35 )             27.5     09-04    141  |  106  |  12.9  ----------------------------<  102<H>  3.9   |  24.0  |  0.52    Ca    8.4      04 Sep 2024 06:35    TPro  5.1<L>  /  Alb  2.9<L>  /  TBili  0.8  /  DBili  x   /  AST  42<H>  /  ALT  23  /  AlkPhos  241<H>  09-03    PT/INR - ( 03 Sep 2024 18:02 )   PT: 13.1 sec;   INR: 1.19 ratio         PTT - ( 03 Sep 2024 18:02 )  PTT:121.6 sec  Urinalysis Basic - ( 04 Sep 2024 06:35 )    Color: x / Appearance: x / SG: x / pH: x  Gluc: 102 mg/dL / Ketone: x  / Bili: x / Urobili: x   Blood: x / Protein: x / Nitrite: x   Leuk Esterase: x / RBC: x / WBC x   Sq Epi: x / Non Sq Epi: x / Bacteria: x      CAPILLARY BLOOD GLUCOSE              Imaging Personally Reviewed:  [ ] YES  [ ] NO    Consultant(s) Notes Reviewed:  [ ] YES  [ ] NO    Care Discussed with Consultants/Other Providers [ ] YES  [ ] NO    Plan of Care discussed with [facility] [ ]YES [ ] NO SUBJECTIVE:    Chief Complaint: Patient is a 38y old  Female who presents with a chief complaint of Acute on chronic blood loss Anemia (03 Sep 2024 20:01)      BRIEF HOSPITAL COURSE: 39yo F with PMHx of Stage 4 Breast Cancer s/p Taxotere/Herceptin with brain mets S/P WBRT now off Enhertu (Last Dose on 6/19), Chronic Iron Deficiency Anemia 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 Pt reports she has had rectal bleeding for a few weeks since her discharged from the hospital. She reports to feeling very tired reason went to her oncologist who check her hemoglobin. She states that she been off chemo since June due to anemia. Denies cp, sob, palpitations. Pt received 2 units pRBCc 9/3, GI was consulted. Last EGD was 07/31 with two bleeding erosions that were cauterized.     INTERVAL HPI/LAST 24HRS EVENTS: Vitals remained stable overnight. No acute events overnight. Patient seen and examined at bedside this morning. She endorsed diffuse abdominal pain and lower back pain, unchanged from yesterday. Stated that her current IV Dilaudid dosage is not enough for pain control and the PO dose doesn't work as well for her; she was unable to sleep due to the pain. She continues to endorse rectal bleeding, last noticed yesterday. She stated that this is a recurrent issue for her where she bleeds per rectum for one week at a time and it tapers off. She stated that her stools are melanotic, with strong foul odor. Pt confirms feeling nauseous this morning. Denies any chest pain, palpitations, orthopnea, leg edema, hematemesis, or any other complaints.     MEDICATIONS  (STANDING):  FLUoxetine 40 milliGRAM(s) Oral at bedtime  lactulose Syrup 20 Gram(s) Oral three times a day  memantine 10 milliGRAM(s) Oral two times a day  methadone    Tablet 15 milliGRAM(s) Oral at bedtime  methadone    Tablet 10 milliGRAM(s) Oral <User Schedule>  OLANZapine 2.5 milliGRAM(s) Oral at bedtime  pantoprazole  Injectable 40 milliGRAM(s) IV Push every 12 hours  pregabalin 200 milliGRAM(s) Oral three times a day    MEDICATIONS  (PRN):  diazepam    Tablet 2 milliGRAM(s) Oral two times a day PRN for muscle spasm, anxiety  HYDROmorphone   Tablet 4 milliGRAM(s) Oral three times a day PRN Moderate Pain (4 - 6)  HYDROmorphone  Injectable 1 milliGRAM(s) IV Push every 4 hours PRN Severe Pain (7 - 10)  ondansetron Injectable 4 milliGRAM(s) IV Push every 8 hours PRN Nausea and/or Vomiting      Allergies    pertuzumab (Other (Severe))  Perjeta (Other (Severe))    Intolerances x        REVIEW OF SYSTEMS: see interval hpi.       OBJECTIVE:    Vital Signs Last 24 Hrs  T(C): 36.9 (04 Sep 2024 08:46), Max: 37.1 (03 Sep 2024 16:16)  T(F): 98.5 (04 Sep 2024 08:46), Max: 98.7 (03 Sep 2024 16:16)  HR: 81 (04 Sep 2024 08:46) (65 - 81)  BP: 98/64 (04 Sep 2024 08:46) (85/52 - 105/54)  BP(mean): 68 (03 Sep 2024 19:11) (68 - 68)  RR: 18 (04 Sep 2024 08:46) (14 - 18)  SpO2: 95% (04 Sep 2024 08:46) (95% - 99%)    Parameters below as of 04 Sep 2024 08:46  Patient On (Oxygen Delivery Method): room air        PHYSICAL EXAM:    GENERAL: patient appears well, no acute distress, appropriate, pleasant  EYES: sclera clear, no exudates  ENMT: oropharynx clear without erythema, no exudates, moist mucous membranes  NECK: supple, soft, no thyromegaly noted  LUNGS: good air entry bilaterally, clear to auscultation, symmetric breath sounds, no wheezing or rhonchi appreciated  HEART: soft S1/S2, regular rate and rhythm, no murmurs noted, no lower extremity edema  GASTROINTESTINAL: abdomen is soft, nontender, nondistended, normoactive bowel sounds, no palpable masses  INTEGUMENT: good skin turgor, warm skin, appears well perfused. R sided port.   MUSCULOSKELETAL: no clubbing or cyanosis, no obvious deformity  NEUROLOGIC: awake, alert, oriented x3, good muscle tone in 4 extremities, no obvious sensory deficits  PSYCHIATRIC: mood is good, affect is congruent, linear and logical thought process  HEME/LYMPH: no palpable supraclavicular nodules, no obvious ecchymosis or petechiae      Lab/ Imaging:    LABS:                        8.6    2.87  )-----------( 154      ( 04 Sep 2024 06:35 )             27.5     09-04    141  |  106  |  12.9  ----------------------------<  102<H>  3.9   |  24.0  |  0.52    Ca    8.4      04 Sep 2024 06:35    TPro  5.1<L>  /  Alb  2.9<L>  /  TBili  0.8  /  DBili  x   /  AST  42<H>  /  ALT  23  /  AlkPhos  241<H>  09-03    PT/INR - ( 03 Sep 2024 18:02 )   PT: 13.1 sec;   INR: 1.19 ratio         PTT - ( 03 Sep 2024 18:02 )  PTT:121.6 sec  Urinalysis Basic - ( 04 Sep 2024 06:35 )    Color: x / Appearance: x / SG: x / pH: x  Gluc: 102 mg/dL / Ketone: x  / Bili: x / Urobili: x   Blood: x / Protein: x / Nitrite: x   Leuk Esterase: x / RBC: x / WBC x   Sq Epi: x / Non Sq Epi: x / Bacteria: x     SUBJECTIVE:    Chief Complaint: Patient is a 38y old  Female who presents with a chief complaint of Acute on chronic blood loss Anemia (03 Sep 2024 20:01)      BRIEF HOSPITAL COURSE: 37yo F with PMHx of Stage 4 Breast Cancer s/p Taxotere/Herceptin with brain mets S/P WBRT now off Enhertu (Last Dose on 6/19), Chronic Iron Deficiency Anemia 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 Chapa) with hemoglobin of 5.9. Pt reports she has had rectal bleeding for a few weeks since her discharged from the hospital. She reports to feeling very tired reason went to her oncologist who check her hemoglobin. She states that she been off chemo since June due to anemia. Denies cp, sob, palpitations. Pt received 2 units pRBCc 9/3, GI was consulted. Last EGD was 07/31 with two bleeding erosions that were cauterized.     INTERVAL HPI/LAST 24HRS EVENTS: Vitals remained stable overnight. No acute events overnight. Patient seen and examined at bedside this morning. She endorsed diffuse abdominal pain and lower back pain, unchanged from yesterday. Stated that her current IV Dilaudid dosage is not enough for pain control and the PO dose doesn't work as well for her; she was unable to sleep due to the pain. She continues to endorse rectal bleeding, last noticed this morning with liquid bloody stool. She stated that this is a recurrent issue for her where she bleeds per rectum for one week at a time and it tapers off. She stated that her stools are melanotic, with strong foul odor. Pt confirms feeling nauseous this morning. Denies any chest pain, palpitations, orthopnea, leg edema, hematemesis, or any other complaints.     MEDICATIONS  (STANDING):  FLUoxetine 40 milliGRAM(s) Oral at bedtime  lactulose Syrup 20 Gram(s) Oral three times a day  memantine 10 milliGRAM(s) Oral two times a day  methadone    Tablet 15 milliGRAM(s) Oral at bedtime  methadone    Tablet 10 milliGRAM(s) Oral <User Schedule>  OLANZapine 2.5 milliGRAM(s) Oral at bedtime  pantoprazole  Injectable 40 milliGRAM(s) IV Push every 12 hours  pregabalin 200 milliGRAM(s) Oral three times a day    MEDICATIONS  (PRN):  diazepam    Tablet 2 milliGRAM(s) Oral two times a day PRN for muscle spasm, anxiety  HYDROmorphone   Tablet 4 milliGRAM(s) Oral three times a day PRN Moderate Pain (4 - 6)  HYDROmorphone  Injectable 1 milliGRAM(s) IV Push every 4 hours PRN Severe Pain (7 - 10)  ondansetron Injectable 4 milliGRAM(s) IV Push every 8 hours PRN Nausea and/or Vomiting      Allergies    pertuzumab (Other (Severe))  Perjeta (Other (Severe))    Intolerances x        REVIEW OF SYSTEMS: see interval hpi.       OBJECTIVE:    Vital Signs Last 24 Hrs  T(C): 36.9 (04 Sep 2024 08:46), Max: 37.1 (03 Sep 2024 16:16)  T(F): 98.5 (04 Sep 2024 08:46), Max: 98.7 (03 Sep 2024 16:16)  HR: 81 (04 Sep 2024 08:46) (65 - 81)  BP: 98/64 (04 Sep 2024 08:46) (85/52 - 105/54)  BP(mean): 68 (03 Sep 2024 19:11) (68 - 68)  RR: 18 (04 Sep 2024 08:46) (14 - 18)  SpO2: 95% (04 Sep 2024 08:46) (95% - 99%)    Parameters below as of 04 Sep 2024 08:46  Patient On (Oxygen Delivery Method): room air        PHYSICAL EXAM:    GENERAL: patient appears well, no acute distress, appropriate, pleasant  EYES: sclera clear, no exudates  ENMT: oropharynx clear without erythema, no exudates, moist mucous membranes  NECK: supple, soft, no thyromegaly noted  LUNGS: good air entry bilaterally, clear to auscultation, symmetric breath sounds, no wheezing or rhonchi appreciated  HEART: soft S1/S2, regular rate and rhythm, no murmurs noted, no lower extremity edema  GASTROINTESTINAL: abdomen is soft, nontender, nondistended, normoactive bowel sounds, no palpable masses  INTEGUMENT: good skin turgor, warm skin, appears well perfused. R sided port.   MUSCULOSKELETAL: no clubbing or cyanosis, no obvious deformity  NEUROLOGIC: awake, alert, oriented x3, good muscle tone in 4 extremities, no obvious sensory deficits  PSYCHIATRIC: mood is good, affect is congruent, linear and logical thought process  HEME/LYMPH: no palpable supraclavicular nodules, no obvious ecchymosis or petechiae      Lab/ Imaging:    LABS:                        8.6    2.87  )-----------( 154      ( 04 Sep 2024 06:35 )             27.5     09-04    141  |  106  |  12.9  ----------------------------<  102<H>  3.9   |  24.0  |  0.52    Ca    8.4      04 Sep 2024 06:35    TPro  5.1<L>  /  Alb  2.9<L>  /  TBili  0.8  /  DBili  x   /  AST  42<H>  /  ALT  23  /  AlkPhos  241<H>  09-03    PT/INR - ( 03 Sep 2024 18:02 )   PT: 13.1 sec;   INR: 1.19 ratio         PTT - ( 03 Sep 2024 18:02 )  PTT:121.6 sec  Urinalysis Basic - ( 04 Sep 2024 06:35 )    Color: x / Appearance: x / SG: x / pH: x  Gluc: 102 mg/dL / Ketone: x  / Bili: x / Urobili: x   Blood: x / Protein: x / Nitrite: x   Leuk Esterase: x / RBC: x / WBC x   Sq Epi: x / Non Sq Epi: x / Bacteria: x     SUBJECTIVE:    Chief Complaint: Patient is a 38y old  Female who presents with a chief complaint of Acute on chronic blood loss Anemia (03 Sep 2024 20:01)      BRIEF HOSPITAL COURSE: 39yo F with PMHx of Stage 4 Breast Cancer s/p Taxotere/Herceptin with brain mets S/P WBRT now off Enhertu (Last Dose on 6/19), Chronic Iron Deficiency Anemia 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 Chapa) with hemoglobin of 5.9. Pt reports she has had rectal bleeding for a few weeks since her discharged from the hospital. She reports to feeling very tired reason went to her oncologist who check her hemoglobin. She states that she been off chemo since June due to anemia. Denies cp, sob, palpitations. Pt received 2 units pRBCc 9/3, GI was consulted. Last EGD was 07/31 with two bleeding erosions that were cauterized.     INTERVAL HPI/LAST 24HRS EVENTS: Vitals remained stable overnight. No acute events overnight. Patient seen and examined at bedside this morning. She endorsed diffuse abdominal pain and lower back pain, unchanged from yesterday. Stated that her current IV Dilaudid dosage is not enough for pain control and the PO dose doesn't work as well for her; she was unable to sleep due to the pain. She continues to endorse rectal bleeding, last noticed this morning with liquid bloody stool. She stated that this is a recurrent issue for her where she bleeds per rectum for one week at a time and it tapers off. She stated that her stools are melanotic, with strong foul odor. Pt confirms feeling nauseous this morning. Denies any chest pain, palpitations, orthopnea, leg edema, hematemesis, or any other complaints.     MEDICATIONS  (STANDING):  FLUoxetine 40 milliGRAM(s) Oral at bedtime  lactulose Syrup 20 Gram(s) Oral three times a day  memantine 10 milliGRAM(s) Oral two times a day  methadone    Tablet 15 milliGRAM(s) Oral at bedtime  methadone    Tablet 10 milliGRAM(s) Oral <User Schedule>  OLANZapine 2.5 milliGRAM(s) Oral at bedtime  pantoprazole  Injectable 40 milliGRAM(s) IV Push every 12 hours  pregabalin 200 milliGRAM(s) Oral three times a day    MEDICATIONS  (PRN):  diazepam    Tablet 2 milliGRAM(s) Oral two times a day PRN for muscle spasm, anxiety  HYDROmorphone   Tablet 4 milliGRAM(s) Oral three times a day PRN Moderate Pain (4 - 6)  HYDROmorphone  Injectable 1 milliGRAM(s) IV Push every 4 hours PRN Severe Pain (7 - 10)  ondansetron Injectable 4 milliGRAM(s) IV Push every 8 hours PRN Nausea and/or Vomiting      Allergies    pertuzumab (Other (Severe))  Perjeta (Other (Severe))    Intolerances x        REVIEW OF SYSTEMS: see interval hpi.       OBJECTIVE:    Vital Signs Last 24 Hrs  T(C): 36.9 (04 Sep 2024 08:46), Max: 37.1 (03 Sep 2024 16:16)  T(F): 98.5 (04 Sep 2024 08:46), Max: 98.7 (03 Sep 2024 16:16)  HR: 81 (04 Sep 2024 08:46) (65 - 81)  BP: 98/64 (04 Sep 2024 08:46) (85/52 - 105/54)  BP(mean): 68 (03 Sep 2024 19:11) (68 - 68)  RR: 18 (04 Sep 2024 08:46) (14 - 18)  SpO2: 95% (04 Sep 2024 08:46) (95% - 99%)    Parameters below as of 04 Sep 2024 08:46  Patient On (Oxygen Delivery Method): room air        PHYSICAL EXAM:    GENERAL: patient appears well, no acute distress, appropriate, pleasant  EYES: sclera clear, no exudates. Mild conjunctival pallor.   ENMT: oropharynx clear without erythema, no exudates, moist, pale mucous membranes  NECK: supple, soft, no thyromegaly noted  LUNGS: good air entry bilaterally, clear to auscultation, symmetric breath sounds, no wheezing or rhonchi appreciated  HEART: soft S1/S2, regular rate and rhythm, no murmurs noted, no lower extremity edema  GASTROINTESTINAL: abdomen is soft, nontender, nondistended, normoactive bowel sounds, no palpable masses  INTEGUMENT: good skin turgor, warm skin, appears well perfused. R sided port.   MUSCULOSKELETAL: no clubbing or cyanosis, no obvious deformity  NEUROLOGIC: awake, alert, oriented x3, good muscle tone in 4 extremities, no obvious sensory deficits  PSYCHIATRIC: mood is good, affect is congruent, linear and logical thought process  HEME/LYMPH: no palpable supraclavicular nodules, no obvious ecchymosis or petechiae      Lab/ Imaging:    LABS:                        8.6    2.87  )-----------( 154      ( 04 Sep 2024 06:35 )             27.5     09-04    141  |  106  |  12.9  ----------------------------<  102<H>  3.9   |  24.0  |  0.52    Ca    8.4      04 Sep 2024 06:35    TPro  5.1<L>  /  Alb  2.9<L>  /  TBili  0.8  /  DBili  x   /  AST  42<H>  /  ALT  23  /  AlkPhos  241<H>  09-03    PT/INR - ( 03 Sep 2024 18:02 )   PT: 13.1 sec;   INR: 1.19 ratio         PTT - ( 03 Sep 2024 18:02 )  PTT:121.6 sec  Urinalysis Basic - ( 04 Sep 2024 06:35 )    Color: x / Appearance: x / SG: x / pH: x  Gluc: 102 mg/dL / Ketone: x  / Bili: x / Urobili: x   Blood: x / Protein: x / Nitrite: x   Leuk Esterase: x / RBC: x / WBC x   Sq Epi: x / Non Sq Epi: x / Bacteria: x     SUBJECTIVE:    Chief Complaint: Patient is a 38y old  Female who presents with a chief complaint of Acute on chronic blood loss Anemia (03 Sep 2024 20:01)      BRIEF HOSPITAL COURSE: 39yo F with PMHx of Stage 4 Breast Cancer s/p Taxotere/Herceptin with brain mets S/P WBRT now off Enhertu (Last Dose on 6/19), Chronic Iron Deficiency Anemia 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 Chapa) with hemoglobin of 5.9. Pt reports she has had rectal bleeding for a few weeks since her discharged from the hospital. She reports to feeling very tired reason went to her oncologist who check her hemoglobin. She states that she been off chemo since June due to anemia. Denies cp, sob, palpitations. Pt received 2 units pRBCc 9/3, GI was consulted, reccs appreciated. Last EGD was 07/31 with two bleeding erosions that were cauterized.     INTERVAL HPI/LAST 24HRS EVENTS: Vitals remained stable overnight. No acute events overnight. Patient seen and examined at bedside this morning. She endorsed diffuse abdominal pain and lower back pain, unchanged from yesterday. Stated that her current IV Dilaudid dosage is not enough for pain control and the PO dose doesn't work as well for her; she was unable to sleep due to the pain. She continues to endorse rectal bleeding, last noticed this morning with liquid bloody stool. She stated that this is a recurrent issue for her where she bleeds per rectum for one week at a time and it tapers off. She stated that her stools are melanotic, with strong foul odor. Pt confirms feeling nauseous this morning. Denies any chest pain, palpitations, orthopnea, leg edema, hematemesis, or any other complaints.     MEDICATIONS  (STANDING):  FLUoxetine 40 milliGRAM(s) Oral at bedtime  lactulose Syrup 20 Gram(s) Oral three times a day  memantine 10 milliGRAM(s) Oral two times a day  methadone    Tablet 15 milliGRAM(s) Oral at bedtime  methadone    Tablet 10 milliGRAM(s) Oral <User Schedule>  OLANZapine 2.5 milliGRAM(s) Oral at bedtime  pantoprazole  Injectable 40 milliGRAM(s) IV Push every 12 hours  pregabalin 200 milliGRAM(s) Oral three times a day    MEDICATIONS  (PRN):  diazepam    Tablet 2 milliGRAM(s) Oral two times a day PRN for muscle spasm, anxiety  HYDROmorphone   Tablet 4 milliGRAM(s) Oral three times a day PRN Moderate Pain (4 - 6)  HYDROmorphone  Injectable 1 milliGRAM(s) IV Push every 4 hours PRN Severe Pain (7 - 10)  ondansetron Injectable 4 milliGRAM(s) IV Push every 8 hours PRN Nausea and/or Vomiting      Allergies    pertuzumab (Other (Severe))  Perjeta (Other (Severe))    Intolerances x        REVIEW OF SYSTEMS: see interval hpi.       OBJECTIVE:    Vital Signs Last 24 Hrs  T(C): 36.9 (04 Sep 2024 08:46), Max: 37.1 (03 Sep 2024 16:16)  T(F): 98.5 (04 Sep 2024 08:46), Max: 98.7 (03 Sep 2024 16:16)  HR: 81 (04 Sep 2024 08:46) (65 - 81)  BP: 98/64 (04 Sep 2024 08:46) (85/52 - 105/54)  BP(mean): 68 (03 Sep 2024 19:11) (68 - 68)  RR: 18 (04 Sep 2024 08:46) (14 - 18)  SpO2: 95% (04 Sep 2024 08:46) (95% - 99%)    Parameters below as of 04 Sep 2024 08:46  Patient On (Oxygen Delivery Method): room air        PHYSICAL EXAM:    GENERAL: patient appears well, no acute distress, appropriate, pleasant  EYES: sclera clear, no exudates. Mild conjunctival pallor.   ENMT: oropharynx clear without erythema, no exudates, moist, pale mucous membranes  NECK: supple, soft, no thyromegaly noted  LUNGS: good air entry bilaterally, clear to auscultation, symmetric breath sounds, no wheezing or rhonchi appreciated  HEART: soft S1/S2, regular rate and rhythm, no murmurs noted, no lower extremity edema  GASTROINTESTINAL: abdomen is soft, diffuse tenderness to palpation, nondistended, normoactive bowel sounds, no palpable masses  INTEGUMENT: good skin turgor, warm skin, appears well perfused. R sided port.   MUSCULOSKELETAL: no clubbing or cyanosis, no obvious deformity. 2+ radial and DP pulses.   NEUROLOGIC: awake, alert, oriented x3, good muscle tone in 4 extremities, no obvious sensory deficits  PSYCHIATRIC: mood is good, affect is congruent, linear and logical thought process  HEME/LYMPH: no palpable supraclavicular nodules, no obvious ecchymosis or petechiae      Lab/ Imaging:    LABS:                        8.6    2.87  )-----------( 154      ( 04 Sep 2024 06:35 )             27.5     09-04    141  |  106  |  12.9  ----------------------------<  102<H>  3.9   |  24.0  |  0.52    Ca    8.4      04 Sep 2024 06:35    TPro  5.1<L>  /  Alb  2.9<L>  /  TBili  0.8  /  DBili  x   /  AST  42<H>  /  ALT  23  /  AlkPhos  241<H>  09-03    PT/INR - ( 03 Sep 2024 18:02 )   PT: 13.1 sec;   INR: 1.19 ratio         PTT - ( 03 Sep 2024 18:02 )  PTT:121.6 sec  Urinalysis Basic - ( 04 Sep 2024 06:35 )    Color: x / Appearance: x / SG: x / pH: x  Gluc: 102 mg/dL / Ketone: x  / Bili: x / Urobili: x   Blood: x / Protein: x / Nitrite: x   Leuk Esterase: x / RBC: x / WBC x   Sq Epi: x / Non Sq Epi: x / Bacteria: x

## 2024-09-04 NOTE — CONSULT NOTE ADULT - SUBJECTIVE AND OBJECTIVE BOX
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 Pt reports she has had rectal bleeding for a few weeks since her discharged from the hospital. She reports to feeling very tired reason went to her oncologist who check her hemoglobin. She states that she been off chemo since June due to anemia. Denies cp, sob, palpitations.  (03 Sep 2024 20:01)            Analgesic Dosing for past 24 hours reviewed as below:    HYDROmorphone   Tablet   4 milliGRAM(s) Oral (09-04-24 @ 05:27)    HYDROmorphone  Injectable   2 milliGRAM(s) IV Push (09-03-24 @ 19:13)    HYDROmorphone  Injectable   1 milliGRAM(s) IV Push (09-04-24 @ 12:09)   1 milliGRAM(s) IV Push (09-04-24 @ 08:08)   1 milliGRAM(s) IV Push (09-04-24 @ 03:54)   1 milliGRAM(s) IV Push (09-03-24 @ 23:49)    HYDROmorphone  Injectable   1 milliGRAM(s) IV Push (09-03-24 @ 17:48)    HYDROmorphone  Injectable   1 milliGRAM(s) IV Push (09-03-24 @ 18:09)    memantine   10 milliGRAM(s) Oral (09-04-24 @ 05:28)    methadone    Tablet   10 milliGRAM(s) Oral (09-04-24 @ 13:15)   10 milliGRAM(s) Oral (09-04-24 @ 08:08)    ondansetron Injectable   4 milliGRAM(s) IV Push (09-03-24 @ 17:48)    pregabalin   200 milliGRAM(s) Oral (09-04-24 @ 13:15)   200 milliGRAM(s) Oral (09-04-24 @ 05:28)          T(C): 36.9 (09-04-24 @ 08:46), Max: 37.1 (09-03-24 @ 16:16)  HR: 81 (09-04-24 @ 08:46) (65 - 81)  BP: 98/64 (09-04-24 @ 08:46) (85/52 - 105/54)  RR: 18 (09-04-24 @ 08:46) (14 - 18)  SpO2: 95% (09-04-24 @ 08:46) (95% - 99%)        chlorhexidine 2% Cloths 1 Application(s) Topical daily  diazepam    Tablet 2 milliGRAM(s) Oral two times a day PRN  FLUoxetine 40 milliGRAM(s) Oral at bedtime  hydrocortisone hemorrhoidal Suppository 1 Suppository(s) Rectal at bedtime  HYDROmorphone   Tablet 4 milliGRAM(s) Oral three times a day PRN  HYDROmorphone  Injectable 1 milliGRAM(s) IV Push every 4 hours PRN  lactulose Syrup 20 Gram(s) Oral three times a day  memantine 10 milliGRAM(s) Oral two times a day  methadone    Tablet 15 milliGRAM(s) Oral at bedtime  methadone    Tablet 10 milliGRAM(s) Oral <User Schedule>  OLANZapine 2.5 milliGRAM(s) Oral at bedtime  ondansetron Injectable 4 milliGRAM(s) IV Push every 8 hours PRN  pantoprazole  Injectable 40 milliGRAM(s) IV Push every 12 hours  pregabalin 200 milliGRAM(s) Oral three times a day                          8.6    2.87  )-----------( 154      ( 04 Sep 2024 06:35 )             27.5     09-04    141  |  106  |  12.9  ----------------------------<  102<H>  3.9   |  24.0  |  0.52    Ca    8.4      04 Sep 2024 06:35    TPro  5.1<L>  /  Alb  2.9<L>  /  TBili  0.8  /  DBili  x   /  AST  42<H>  /  ALT  23  /  AlkPhos  241<H>  09-03    PT/INR - ( 03 Sep 2024 18:02 )   PT: 13.1 sec;   INR: 1.19 ratio         PTT - ( 03 Sep 2024 18:02 )  PTT:121.6 sec  Urinalysis Basic - ( 04 Sep 2024 06:35 )    Color: x / Appearance: x / SG: x / pH: x  Gluc: 102 mg/dL / Ketone: x  / Bili: x / Urobili: x   Blood: x / Protein: x / Nitrite: x   Leuk Esterase: x / RBC: x / WBC x   Sq Epi: x / Non Sq Epi: x / Bacteria: x        Pain Service   [phone]

## 2024-09-04 NOTE — PROGRESS NOTE ADULT - ASSESSMENT
38-year-old female who follows with Dr Chapa/OUMOU for a history of metastatic breast Ca S/P Taxoetere/Herceptin with brain mets S/P WBRT S/P Enhertu LD 6/19 now on hold and plan to switch to Kadcyla once counts stabilize Also with a history of GARETT due to GAVE with frequent GIB S/p Sandostatin LA 8/19/24, will switch to SA octreotide 9/19/24 given cost  and iv iron weekly LD 8/29  and procrit LD  Pt reports she has had rectal bleeding for a few weeks since her discharged from the hospital. She reports to feeling very tired reason went to her oncologist who check her hemoglobin which was 5.8     history of metastatic breast Ca   - Follows Dr Chapa/OUMOU    - S/P Taxoetere/Herceptin   - S/P WBRT for brain mets  -S/P Enhertu LD 6/19 now on hold   - plan to switch to Kadcyla once counts stabilize   - WBC 2.87 not neutropenic Plt 154    GARETT due to GAVE  - Hgb 5.9 on admission  - S/P 2 UPRBC   - Hgb now   - frequent GIB   - S/p Sandostatin LA 8/19/24, will switch to SA octreotide 9/19/24 given cost   - S/P iv iron weekly LD 8/29    - receives procrit  weekly  -received 80mg IV protonix in the ED   -cont with Protonix 40mg IV BID  -GI following - S/p EGD with APC on 07/31/24 Avoid use of NSAIDs  add Anusol suppository at HS No plan for endoscopic evaluation    Pain management following -plan for PCA -cont home methadone 10/10//15    Will follow

## 2024-09-04 NOTE — CONSULT NOTE ADULT - SUBJECTIVE AND OBJECTIVE BOX
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 Pt reports she has had rectal bleeding for a few weeks since her discharged from the hospital. She reports to feeling very tired reason went to her oncologist who check her hemoglobin. She states that she been off chemo since June due to anemia. Denies cp, sob, palpitations.   GI consulted for further recommendation. Her last EGD was 07/31/2024 showed      Review of Systems:  . Constitutional: No fever, chills  . HEENT: Negative  · Respiratory and Thorax: No shortness of breath, no cough  · Cardiovascular: No chest pain, palpitation, no dizziness  · Gastrointestinal: see above  · Genitourinary: No hematuria  · Musculoskeletal: Negative  · Neurological: negative  · Psychiatric: no agitation, no anxiety      PAST MEDICAL/SURGICAL HISTORY:  H/O compression fracture of spine    Anxiety    Metastatic breast cancer    H/O pleural effusion    Pericardial effusion    S/P tonsillectomy    H/O chest tube placement  12/23/21    S/P pericardiocentesis  12/28/21      SOCIAL HISTORY:  - TOBACCO: Denies  - ALCOHOL: Denies  - ILLICIT DRUG USE: Denies    FAMILY HISTORY:  No known history of gastrointestinal or liver disease;  FH: CVA (cerebrovascular accident)        HOME MEDICATIONS:  diazePAM 2 mg oral tablet: 1 tab(s) orally 2 times a day as needed for  muscle spasm, anxiety (04 Sep 2024 03:58)  Dilaudid 4 mg oral tablet: 1 tab(s) orally 3 times a day as needed for  moderate pain (04 Sep 2024 03:59)  Dilaudid 8 mg oral tablet: 1 tab(s) orally 4 times a day as needed for  severe pain (04 Sep 2024 01:30)  FLUoxetine 40 mg oral capsule: 2 cap(s) orally once a day (at bedtime) (17 Aug 2024 01:42)  methadone 10 mg oral tablet: 1 tab(s) orally 3 times a day (04 Sep 2024 01:23)  methadone 5 mg oral tablet: 3 tab(s) orally once a day (at bedtime) (04 Sep 2024 03:52)  Namenda 10 mg oral tablet: 1 tab(s) orally (17 Aug 2024 01:40)  octreotide 100 mcg/mL injectable solution: 100 microgram(s) 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 (17 Aug 2024 01:42)  OLANZapine 2.5 mg oral tablet: 1 tab(s) orally once a day (at bedtime) (17 Aug 2024 01:42)  pregabalin 200 mg oral capsule: 1 cap(s) orally 3 times a day (17 Aug 2024 01:42)    INPATIENT MEDICATIONS:  MEDICATIONS  (STANDING):  FLUoxetine 40 milliGRAM(s) Oral at bedtime  lactulose Syrup 20 Gram(s) Oral three times a day  memantine 10 milliGRAM(s) Oral two times a day  methadone    Tablet 15 milliGRAM(s) Oral at bedtime  methadone    Tablet 10 milliGRAM(s) Oral <User Schedule>  OLANZapine 2.5 milliGRAM(s) Oral at bedtime  pantoprazole  Injectable 40 milliGRAM(s) IV Push every 12 hours  pregabalin 200 milliGRAM(s) Oral three times a day    MEDICATIONS  (PRN):  diazepam    Tablet 2 milliGRAM(s) Oral two times a day PRN for muscle spasm, anxiety  HYDROmorphone   Tablet 4 milliGRAM(s) Oral three times a day PRN Moderate Pain (4 - 6)  HYDROmorphone  Injectable 1 milliGRAM(s) IV Push every 4 hours PRN Severe Pain (7 - 10)  ondansetron Injectable 4 milliGRAM(s) IV Push every 8 hours PRN Nausea and/or Vomiting    ALLERGIES:  pertuzumab (Other (Severe))  Perjeta (Other (Severe))    T(C): 36.9 (09-04-24 @ 08:46), Max: 37.1 (09-03-24 @ 16:16)  HR: 81 (09-04-24 @ 08:46) (65 - 81)  BP: 98/64 (09-04-24 @ 08:46) (85/52 - 105/54)  RR: 18 (09-04-24 @ 08:46) (14 - 18)  SpO2: 95% (09-04-24 @ 08:46) (95% - 99%)      PHYSICAL EXAM:    Constitutional: No acute distress  Neuro: Awake alert, oriented  HEENT: anicteric sclerae  CV: regular rate, regular rhythm  Pulm/chest: lung sounds diminished bilaterally, no accessory muscle use noted  Abd: soft, + LLQ tenderness,  nondistended +bowel sounds. No rigidity, rebound tenderness, or guarding  Rectal exam:  Ext: no edema  Skin: warm, no jaundice   Psych: calm, cooperative        LABS:             8.6    2.87  )-----------( 154      ( 09-04 @ 06:35 )             27.5                5.9    2.86  )-----------( 155      ( 09-03 @ 17:15 )             19.7       PT/INR - ( 03 Sep 2024 18:02 )   PT: 13.1 sec;   INR: 1.19 ratio         PTT - ( 03 Sep 2024 18:02 )  PTT:121.6 sec  09-04    141  |  106  |  12.9  ----------------------------<  102<H>  3.9   |  24.0  |  0.52    Ca    8.4      04 Sep 2024 06:35    TPro  5.1<L>  /  Alb  2.9<L>  /  TBili  0.8  /  DBili  x   /  AST  42<H>  /  ALT  23  /  AlkPhos  241<H>  09-03    LIVER FUNCTIONS - ( 03 Sep 2024 18:02 )  Alb: 2.9 g/dL / Pro: 5.1 g/dL / ALK PHOS: 241 U/L / ALT: 23 U/L / AST: 42 U/L / GGT: x             Urinalysis Basic - ( 04 Sep 2024 06:35 )    Color: x / Appearance: x / SG: x / pH: x  Gluc: 102 mg/dL / Ketone: x  / Bili: x / Urobili: x   Blood: x / Protein: x / Nitrite: x   Leuk Esterase: x / RBC: x / WBC x   Sq Epi: x / Non Sq Epi: x / Bacteria: x    < from: EGD (07.31.24 @ 14:50) >    Findings:        Esophagus Normal esophagus.        Stomach Mucosa Localized friability. There were 2 erosions which were oozing.    There weresmall erythematous spots in the antrum as well. Of the mucosa was    noted in the antrum. The 2 erosions that were oozing were cauterized with APC.    2-3 other non bleeding erythematous spots ( the largest measuring 3-4 mm ) were    cauterized as well.        Duodenum Normal duodenum.        Impressions:        Normal esophagus.        Normal duodenum.      Friability. There were 2 erosions which were oozing. There were small    erythematous spots in the antrum as well. In the antrum.      Plan:    Clear Liquid Diet , PPI  BID        Mable Connelly MD, DO    < end of copied text >       Nutrition Progress Note  (Physician Action Needed)    Physician- please edit note, check the appropriate diagnosis from list below and indicate whether you agree with the plan of care    The registered dietitian has identified that this patient meets criteria for malnutrition.    Nutrition Diagnosis:   Moderate Protein Calorie Malnutrition related to poor appetite, early satiety, fatigue, cardiomyopathy as evidenced by intakes meeting <50% of estimated energy requirements for >1 month, possible weight loss masked by fluid, moderate fat/muscle mass loss to: temples, orbitales, clavicle.     Intervention:  General/healthful diet:   Liberalized diet to 2 gm sodium to help promote PO intakes. Continue fluid restriction per Provider.   - Encouraged small, frequent meals and snacks to optimize nutrition.   - Discussed non-meat protein sources and encouraged protein rich foods with each meal.   - Will order sugar free hard candy to be sent on meal trays.     Commerical food:   Trail High Protein Gelatin 1x/day and Magic Cup 1x/day per discussion with patient.   - Supplements will not count toward fluid restriction.      Physician Documentation  Based on above, patient meets criteria for:    []  Severe Protein Calorie Malnutrition  [x]  Moderate Protein Calorie Malnutrition  []  Mild Protein Calorie Malnutrition  [] Unable to determine   [] Other:     * Please add the appropriate diagnosis to the patient's Problem List and subsequent Progress Notes.     Plan of care:   [x]  Agree with nutrition assessment and plan of care as stated above.    [] Agree with nutrition assessment and plan of care, with exception. Patient meets criteria for diagnosis except ________.    [] Disagree with nutrition assessment and plan of care because_________.         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 Pt reports she has had rectal bleeding for a few weeks since her discharged from the hospital. She reports to feeling very tired reason went to her oncologist who check her hemoglobin. She states that she been off chemo since June due to anemia. Denies cp, sob, palpitations (September 03/2024).   GI consulted for further recommendation. Her last EGD was 07/31/2024 showed friable gastric mucosa, with oozing erosions which were oozing, also small erythematous spots in the antrum measuring 3-4 mm, s/p cauterization with APC. Patient reports loose burgundy color BM and also on wipes. Reported left lower quadrant pain. Denies hematemesis, melena, fever, chills. Denies use of NSAIDs.      . Constitutional: No fever, chills  . HEENT: Negative  · Respiratory and Thorax: No shortness of breath, no cough  · Cardiovascular: No chest pain, palpitation, no dizziness  · Gastrointestinal: see above  · Genitourinary: No hematuria  · Musculoskeletal: Negative  · Neurological: negative  · Psychiatric: no agitation, no anxiety      PAST MEDICAL/SURGICAL HISTORY:  H/O compression fracture of spine    Anxiety    Metastatic breast cancer    H/O pleural effusion    Pericardial effusion    S/P tonsillectomy    H/O chest tube placement  12/23/21    S/P pericardiocentesis  12/28/21      SOCIAL HISTORY:  - TOBACCO: Denies  - ALCOHOL: Denies  - ILLICIT DRUG USE: Denies    FAMILY HISTORY:  No known history of gastrointestinal or liver disease;  FH: CVA (cerebrovascular accident)        HOME MEDICATIONS:  diazePAM 2 mg oral tablet: 1 tab(s) orally 2 times a day as needed for  muscle spasm, anxiety (04 Sep 2024 03:58)  Dilaudid 4 mg oral tablet: 1 tab(s) orally 3 times a day as needed for  moderate pain (04 Sep 2024 03:59)  Dilaudid 8 mg oral tablet: 1 tab(s) orally 4 times a day as needed for  severe pain (04 Sep 2024 01:30)  FLUoxetine 40 mg oral capsule: 2 cap(s) orally once a day (at bedtime) (17 Aug 2024 01:42)  methadone 10 mg oral tablet: 1 tab(s) orally 3 times a day (04 Sep 2024 01:23)  methadone 5 mg oral tablet: 3 tab(s) orally once a day (at bedtime) (04 Sep 2024 03:52)  Namenda 10 mg oral tablet: 1 tab(s) orally (17 Aug 2024 01:40)  octreotide 100 mcg/mL injectable solution: 100 microgram(s) 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 (17 Aug 2024 01:42)  OLANZapine 2.5 mg oral tablet: 1 tab(s) orally once a day (at bedtime) (17 Aug 2024 01:42)  pregabalin 200 mg oral capsule: 1 cap(s) orally 3 times a day (17 Aug 2024 01:42)    INPATIENT MEDICATIONS:  MEDICATIONS  (STANDING):  FLUoxetine 40 milliGRAM(s) Oral at bedtime  lactulose Syrup 20 Gram(s) Oral three times a day  memantine 10 milliGRAM(s) Oral two times a day  methadone    Tablet 15 milliGRAM(s) Oral at bedtime  methadone    Tablet 10 milliGRAM(s) Oral <User Schedule>  OLANZapine 2.5 milliGRAM(s) Oral at bedtime  pantoprazole  Injectable 40 milliGRAM(s) IV Push every 12 hours  pregabalin 200 milliGRAM(s) Oral three times a day    MEDICATIONS  (PRN):  diazepam    Tablet 2 milliGRAM(s) Oral two times a day PRN for muscle spasm, anxiety  HYDROmorphone   Tablet 4 milliGRAM(s) Oral three times a day PRN Moderate Pain (4 - 6)  HYDROmorphone  Injectable 1 milliGRAM(s) IV Push every 4 hours PRN Severe Pain (7 - 10)  ondansetron Injectable 4 milliGRAM(s) IV Push every 8 hours PRN Nausea and/or Vomiting    ALLERGIES:  pertuzumab (Other (Severe))  Perjeta (Other (Severe))    T(C): 36.9 (09-04-24 @ 08:46), Max: 37.1 (09-03-24 @ 16:16)  HR: 81 (09-04-24 @ 08:46) (65 - 81)  BP: 98/64 (09-04-24 @ 08:46) (85/52 - 105/54)  RR: 18 (09-04-24 @ 08:46) (14 - 18)  SpO2: 95% (09-04-24 @ 08:46) (95% - 99%)      PHYSICAL EXAM:    Constitutional: No acute distress  Neuro: Awake alert, oriented  HEENT: anicteric sclerae  CV: regular rate, regular rhythm  Pulm/chest: lung sounds diminished bilaterally, no accessory muscle use noted  Abd: soft, + LLQ tenderness,  nondistended +bowel sounds. No rigidity, rebound tenderness, or guarding  Rectal exam: Noted brown stool with burgundy color on wipes  Ext: no edema  Skin: warm, no jaundice   Psych: calm, cooperative        LABS:             8.6    2.87  )-----------( 154      ( 09-04 @ 06:35 )             27.5                5.9    2.86  )-----------( 155      ( 09-03 @ 17:15 )             19.7       PT/INR - ( 03 Sep 2024 18:02 )   PT: 13.1 sec;   INR: 1.19 ratio         PTT - ( 03 Sep 2024 18:02 )  PTT:121.6 sec  09-04    141  |  106  |  12.9  ----------------------------<  102<H>  3.9   |  24.0  |  0.52    Ca    8.4      04 Sep 2024 06:35    TPro  5.1<L>  /  Alb  2.9<L>  /  TBili  0.8  /  DBili  x   /  AST  42<H>  /  ALT  23  /  AlkPhos  241<H>  09-03    LIVER FUNCTIONS - ( 03 Sep 2024 18:02 )  Alb: 2.9 g/dL / Pro: 5.1 g/dL / ALK PHOS: 241 U/L / ALT: 23 U/L / AST: 42 U/L / GGT: x             Urinalysis Basic - ( 04 Sep 2024 06:35 )    Color: x / Appearance: x / SG: x / pH: x  Gluc: 102 mg/dL / Ketone: x  / Bili: x / Urobili: x   Blood: x / Protein: x / Nitrite: x   Leuk Esterase: x / RBC: x / WBC x   Sq Epi: x / Non Sq Epi: x / Bacteria: x      < from: EGD (07.31.24 @ 14:50) >    Findings:      Esophagus Normal esophagus.      Stomach Mucosa Localized friability. There were 2 erosions which were oozing.    There were small erythematous spots in the antrum as well. Of the mucosa was    noted in the antrum. The 2 erosions that were oozing were cauterized with APC.    2-3 other non bleeding erythematous spots ( the largest measuring 3-4 mm ) were    cauterized as well.      Duodenum Normal duodenum.        Impressions:      Normal esophagus.      Normal duodenum.    Friability. There were 2 erosions which were oozing. There were small    erythematous spots in the antrum as well. In the antrum.      Plan:    Clear Liquid Diet , PPI  BID      Mable Connelly MD, DO    < end of copied text >

## 2024-09-04 NOTE — PROGRESS NOTE ADULT - ASSESSMENT
. 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     Plan:    #Acute on chronic blood loss Anemia  -with hx of GAVE recent admission s/p EGD with cauterization (7/31).   -H/H 5.9/19.7, FOBT positive   -received 2u PRBC 09/03  -post transfusion H/H is 8.6/27.5  -received 80mg IV protonix in the ED   -cont with Protonix 40mg IV BID  - c/w NPO for now   -GI consulted      Metastatic Breast Cancer   -off Enhertu (last dose 6/19)  - Hem/ Onc following  -Outpatient follow up with Rad / Onc  -Pain control with Dilaudid po and IV, pt reports that PO Dilaudid ineffective --> PCA pain pump 9/3  -pt was on PCA pump on prior admission     -Valium prn for muscle spasm and anxiety  -cont home dose of methadone   -pain management consulted 9/3      Anxiety/Mood Disorder  - Continue Fluoxetine, Olanzapine and Diazepam (PRN)    DVT PPx  -SCDs     Dispo: clinically active, waiting GI evaluation    37yo F with PMHx of Stage 4 Breast Cancer s/p Taxotere/Herceptin with brain mets S/P WBRT now off Enhertu (Last Dose on 6/19), Chronic Iron Deficiency Anemia 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 Chapa) with hemoglobin of 5.9.     Plan:    #Acute on chronic blood loss Anemia  -with hx of GAVE recent admission s/p EGD with cauterization (7/31).   -H/H 5.9/19.7, FOBT positive   -received 2u PRBC 09/03  -post transfusion H/H is 8.6/27.5  -received 80mg IV protonix in the ED   -cont with Protonix 40mg IV BID  -c/w NPO for now   -GI consulted 9/3     Metastatic Breast Cancer   -off Enhertu (last dose 6/19)  - Hem/ Onc following  -Outpatient follow up with Rad / Onc  -Pain control with Dilaudid po and IV, pt reports that PO Dilaudid ineffective --> PCA pain pump 9/3  -pt was on PCA pump on prior admission     -Valium prn for muscle spasm and anxiety  -cont home dose of methadone   -pain management consulted 9/4    Anxiety/Mood Disorder  - Continue Fluoxetine, Olanzapine and Diazepam (PRN)    DVT PPx  -SCDs     Dispo: acute, waiting GI evaluation    39yo F with PMHx of Stage 4 Breast Cancer s/p Taxotere/Herceptin with brain mets S/P WBRT now off Enhertu (Last Dose on 6/19), Chronic Iron Deficiency Anemia 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 Chapa) with hemoglobin of 5.9.     Plan:    #Acute on chronic blood loss Anemia  -with hx of GAVE recent admission s/p EGD with cauterization (7/31).   -H/H 5.9/19.7, FOBT positive   -received 2u PRBC 09/03  -post transfusion H/H is 8.6/27.5 (9/4)  -received 80mg IV protonix in the ED   -cont with Protonix 40mg IV BID   -GI consulted, appreciate reccs 9/3     Metastatic Breast Cancer   -off Enhertu (last dose 6/19)  - Hem/ Onc following  -Outpatient follow up with Rad / Onc  -Pain control with Dilaudid po and IV, pt reports that PO Dilaudid ineffective --> PCA pain pump 9/3  -pt was on PCA pump on prior admission     -Valium prn for muscle spasm and anxiety  -cont home dose of methadone   -pain management consulted 9/4    Anxiety/Mood Disorder  - Continue Fluoxetine, Olanzapine and Diazepam (PRN)    DVT PPx  -SCDs     Dispo: 48-72 hours, pending medical optimization

## 2024-09-04 NOTE — CONSULT NOTE ADULT - ASSESSMENT
37yo F with PMHx of Stage 4 Breast Cancer s/p Taxotere/Herceptin with brain mets S/P WBRT now off Enhertu (Last Dose on 6/19), Chronic Iron Deficiency Anemia   presented to ED after referred by hematology for symptomatic anemia. Found to have hemoglobin 5.9 gm in ED.     Symptomatic anemia  Hemoglobin 5.9 gm, normocytic. Noted brown stool with burgundy color on wipes. Received 2 units of PRBC, repeat hgb 8.6 gm.   EGD 07/31/2024 showed friable gastric mucosa, with oozing erosions which were oozing, also small erythematous spots in the antrum measuring 3-4 mm, s/p cauterization with APC.    Plan:  S/p EGD with APC on 07/31/24  Trend CBC, transfuse to Hgb 8 or higher  Protonix 40 mg BID  Can start clear liquids  Avoid use of NSAIDs  No plan for endoscopic evaluation at this time  Further care per primary team     39yo F with PMHx of Stage 4 Breast Cancer s/p Taxotere/Herceptin with brain mets S/P WBRT now off Enhertu (Last Dose on 6/19), Chronic Iron Deficiency Anemia   presented to ED after referred by hematology for symptomatic anemia. Found to have hemoglobin 5.9 gm in ED.     Symptomatic anemia  Hemoglobin 5.9 gm, normocytic. Noted brown stool with burgundy color on wipes. Received 2 units of PRBC, repeat hgb 8.6 gm.   EGD 07/31/2024 showed friable gastric mucosa, with oozing erosions which were oozing, also small erythematous spots in the antrum measuring 3-4 mm, s/p cauterization with APC.    Plan:  S/p EGD with APC on 07/31/24  Trend CBC, transfuse to Hgb 8 or higher  Protonix 40 mg BID  Can start clear liquids  Avoid use of NSAIDs  Please add Anusol suppository at HS  Continue home dose Octreotide for GAVE  Follow up with Hematology as outpatient  No plan for endoscopic evaluation  Further care per primary team

## 2024-09-04 NOTE — CONSULT NOTE ADULT - ASSESSMENT
37 y/o female with hx of Stage-4 Breast cancer with diffuse osseous mets, on home methadone for chronic pain  uncontrolled pain on iv dilaudid    will order dPCA 0/0.3/8/6    please dc other opioid when pca is set up    cont home methadone 10/10//15        when due for discharge:  DC PCA  - Recommend starting dilaudid PO 4mg/8mg [id number] PRN mod/severe pain

## 2024-09-04 NOTE — CONSULT NOTE ADULT - NS ATTEND AMEND GEN_ALL_CORE FT
Patient known to us for history of stage IV breast cancer with brain mets and chronic iron-deficiency anemia, GAVE, pericardial effusion, spine mets and fractures.  At this time complaining of loose stools open the blood count.  Will transfuse.  Will recommend to start with Anusol suppositories and monitor the blood count.  If continues to have further can consider a flexible sigmoidoscopy.  Whether we need to consider another EGD will be evaluated based on the patient clinical course.

## 2024-09-04 NOTE — PROGRESS NOTE ADULT - SUBJECTIVE AND OBJECTIVE BOX
38-year-old female who follows with Dr Chapa/OUMOU for a history of metastatic breast Ca S/P Taxoetere/Herceptin with brain mets S/P WBRT S/P Enhertu LD 6/19 now on hold and plan to switch to Kadcyla once counts stabilize Also with a history of GARETT due to GAVE with frequent GIB S/p Sandostatin LA 8/19/24, will switch to SA octreotide 9/19/24 given cost  and iv iron weekly LD 8/29  and procrit LD  Pt reports she has had rectal bleeding for a few weeks since her discharged from the hospital. She reports to feeling very tired reason went to her oncologist who check her hemoglobin which was 5.8       PAST MEDICAL & SURGICAL HISTORY:  H/O compression fracture of spine  Anxiety  Metastatic breast cancer  H/O pleural effusion  Pericardial effusion  S/P tonsillectomy  H/O chest tube placement  12/23/21  S/P pericardiocentesis  12/28/21    Allergies  pertuzumab (Other (Severe))  Perjeta (Other (Severe))    MEDICATIONS  (STANDING):  chlorhexidine 2% Cloths 1 Application(s) Topical daily  FLUoxetine 40 milliGRAM(s) Oral at bedtime  hydrocortisone hemorrhoidal Suppository 1 Suppository(s) Rectal at bedtime  HYDROmorphone PCA (1 mG/mL) 30 milliLiter(s) PCA Continuous PCA Continuous  lactulose Syrup 20 Gram(s) Oral three times a day  memantine 10 milliGRAM(s) Oral two times a day  methadone    Tablet 15 milliGRAM(s) Oral at bedtime  methadone    Tablet 10 milliGRAM(s) Oral <User Schedule>  OLANZapine 2.5 milliGRAM(s) Oral at bedtime  pantoprazole  Injectable 40 milliGRAM(s) IV Push every 12 hours  pregabalin 200 milliGRAM(s) Oral three times a day    MEDICATIONS  (PRN):  diazepam    Tablet 2 milliGRAM(s) Oral two times a day PRN for muscle spasm, anxiety  naloxone Injectable 0.1 milliGRAM(s) IV Push every 3 minutes PRN For ANY of the following changes in patient status:  A. RR LESS THAN 10 breaths per minute, B. Oxygen saturation LESS THAN 90%, C. Sedation score of 6  ondansetron Injectable 4 milliGRAM(s) IV Push every 8 hours PRN Nausea and/or Vomiting    Vital Signs Last 24 Hrs  T(C): 36.9 (04 Sep 2024 08:46), Max: 37.1 (03 Sep 2024 16:16)  T(F): 98.5 (04 Sep 2024 08:46), Max: 98.7 (03 Sep 2024 16:16)  HR: 81 (04 Sep 2024 08:46) (65 - 81)  BP: 98/64 (04 Sep 2024 08:46) (85/52 - 105/54)  BP(mean): 68 (03 Sep 2024 19:11) (68 - 68)  RR: 18 (04 Sep 2024 08:46) (14 - 18)  SpO2: 95% (04 Sep 2024 08:46) (95% - 99%)    Parameters below as of 04 Sep 2024 08:46  Patient On (Oxygen Delivery Method): room air      PE  GENERAL:  no acute distress, appropriate, pleasant  EYES: sclera clear,   ENMT: oropharynx clear without erythema, no exudates, moist mucous membranes  NECK: supple, soft,   LUNGS: good air entry bilaterally, clear to auscultation,    HEART: soft S1/S2, regular rate and rhythm,    GASTROINTESTINAL: abdomen is soft,   INTEGUMENT: good skin turgor, warm and dry  MUSCULOSKELETAL: no clubbing or cyanosis, no obvious deformity  NEUROLOGIC: awake, alert, oriented x3,   PSYCHIATRIC: mood is good, affect is congruent, linear and logical thought process    CBC                        8.6    2.87  )-----------( 154      ( 04 Sep 2024 06:35 )             27.5     CHEM    09-04    141  |  106  |  12.9  ----------------------------<  102<H>  3.9   |  24.0  |  0.52    Ca    8.4      04 Sep 2024 06:35    TPro  5.1<L>  /  Alb  2.9<L>  /  TBili  0.8  /  DBili  x   /  AST  42<H>  /  ALT  23  /  AlkPhos  241<H>  09-03

## 2024-09-05 LAB
ANION GAP SERPL CALC-SCNC: 10 MMOL/L — SIGNIFICANT CHANGE UP (ref 5–17)
BUN SERPL-MCNC: 9.5 MG/DL — SIGNIFICANT CHANGE UP (ref 8–20)
CALCIUM SERPL-MCNC: 8.2 MG/DL — LOW (ref 8.4–10.5)
CHLORIDE SERPL-SCNC: 108 MMOL/L — SIGNIFICANT CHANGE UP (ref 96–108)
CO2 SERPL-SCNC: 25 MMOL/L — SIGNIFICANT CHANGE UP (ref 22–29)
CREAT SERPL-MCNC: 0.46 MG/DL — LOW (ref 0.5–1.3)
EGFR: 126 ML/MIN/1.73M2 — SIGNIFICANT CHANGE UP
GLUCOSE SERPL-MCNC: 85 MG/DL — SIGNIFICANT CHANGE UP (ref 70–99)
HCT VFR BLD CALC: 25.1 % — LOW (ref 34.5–45)
HCT VFR BLD CALC: 33.3 % — LOW (ref 34.5–45)
HGB BLD-MCNC: 10.1 G/DL — LOW (ref 11.5–15.5)
HGB BLD-MCNC: 7.7 G/DL — LOW (ref 11.5–15.5)
MAGNESIUM SERPL-MCNC: 2 MG/DL — SIGNIFICANT CHANGE UP (ref 1.6–2.6)
MCHC RBC-ENTMCNC: 29.4 PG — SIGNIFICANT CHANGE UP (ref 27–34)
MCHC RBC-ENTMCNC: 29.7 PG — SIGNIFICANT CHANGE UP (ref 27–34)
MCHC RBC-ENTMCNC: 30.3 GM/DL — LOW (ref 32–36)
MCHC RBC-ENTMCNC: 30.7 GM/DL — LOW (ref 32–36)
MCV RBC AUTO: 96.8 FL — SIGNIFICANT CHANGE UP (ref 80–100)
MCV RBC AUTO: 96.9 FL — SIGNIFICANT CHANGE UP (ref 80–100)
MRSA PCR RESULT.: SIGNIFICANT CHANGE UP
PHOSPHATE SERPL-MCNC: 4.1 MG/DL — SIGNIFICANT CHANGE UP (ref 2.4–4.7)
PLATELET # BLD AUTO: 155 K/UL — SIGNIFICANT CHANGE UP (ref 150–400)
PLATELET # BLD AUTO: 165 K/UL — SIGNIFICANT CHANGE UP (ref 150–400)
POTASSIUM SERPL-MCNC: 3.7 MMOL/L — SIGNIFICANT CHANGE UP (ref 3.5–5.3)
POTASSIUM SERPL-SCNC: 3.7 MMOL/L — SIGNIFICANT CHANGE UP (ref 3.5–5.3)
RBC # BLD: 2.59 M/UL — LOW (ref 3.8–5.2)
RBC # BLD: 3.44 M/UL — LOW (ref 3.8–5.2)
RBC # FLD: 18.6 % — HIGH (ref 10.3–14.5)
RBC # FLD: 19.3 % — HIGH (ref 10.3–14.5)
S AUREUS DNA NOSE QL NAA+PROBE: DETECTED
SODIUM SERPL-SCNC: 142 MMOL/L — SIGNIFICANT CHANGE UP (ref 135–145)
WBC # BLD: 2.44 K/UL — LOW (ref 3.8–10.5)
WBC # BLD: 2.98 K/UL — LOW (ref 3.8–10.5)
WBC # FLD AUTO: 2.44 K/UL — LOW (ref 3.8–10.5)
WBC # FLD AUTO: 2.98 K/UL — LOW (ref 3.8–10.5)

## 2024-09-05 PROCEDURE — 99232 SBSQ HOSP IP/OBS MODERATE 35: CPT

## 2024-09-05 PROCEDURE — 99233 SBSQ HOSP IP/OBS HIGH 50: CPT

## 2024-09-05 RX ORDER — LIDOCAINE/BENZALKONIUM/ALCOHOL
1 SOLUTION, NON-ORAL TOPICAL DAILY
Refills: 0 | Status: DISCONTINUED | OUTPATIENT
Start: 2024-09-05 | End: 2024-09-08

## 2024-09-05 RX ORDER — POLYETHYLENE GLYCOL 3350 17 G/17G
17 POWDER, FOR SOLUTION ORAL
Refills: 0 | Status: DISCONTINUED | OUTPATIENT
Start: 2024-09-05 | End: 2024-09-05

## 2024-09-05 RX ADMIN — Medication 40 MILLIGRAM(S): at 17:27

## 2024-09-05 RX ADMIN — METHADONE HYDROCHLORIDE 15 MILLIGRAM(S): 1 POWDER ORAL at 21:05

## 2024-09-05 RX ADMIN — Medication 40 MILLIGRAM(S): at 06:17

## 2024-09-05 RX ADMIN — HYDROMORPHONE HYDROCHLORIDE 30 MILLILITER(S): 2 TABLET ORAL at 07:44

## 2024-09-05 RX ADMIN — METHADONE HYDROCHLORIDE 10 MILLIGRAM(S): 1 POWDER ORAL at 13:01

## 2024-09-05 RX ADMIN — POLYETHYLENE GLYCOL 3350 17 GRAM(S): 17 POWDER, FOR SOLUTION ORAL at 17:28

## 2024-09-05 RX ADMIN — HYDROMORPHONE HYDROCHLORIDE 30 MILLILITER(S): 2 TABLET ORAL at 19:48

## 2024-09-05 RX ADMIN — CHLORHEXIDINE GLUCONATE 1 APPLICATION(S): 40 SOLUTION TOPICAL at 14:57

## 2024-09-05 RX ADMIN — MEMANTINE 10 MILLIGRAM(S): 7 CAPSULE, EXTENDED RELEASE ORAL at 17:28

## 2024-09-05 RX ADMIN — OLANZAPINE 2.5 MILLIGRAM(S): 7.5 TABLET ORAL at 21:06

## 2024-09-05 RX ADMIN — MEMANTINE 10 MILLIGRAM(S): 7 CAPSULE, EXTENDED RELEASE ORAL at 06:17

## 2024-09-05 RX ADMIN — Medication 1 PATCH: at 17:28

## 2024-09-05 RX ADMIN — PREGABALIN 200 MILLIGRAM(S): 75 CAPSULE ORAL at 13:01

## 2024-09-05 RX ADMIN — METHADONE HYDROCHLORIDE 10 MILLIGRAM(S): 1 POWDER ORAL at 08:57

## 2024-09-05 RX ADMIN — PREGABALIN 200 MILLIGRAM(S): 75 CAPSULE ORAL at 21:05

## 2024-09-05 RX ADMIN — PREGABALIN 200 MILLIGRAM(S): 75 CAPSULE ORAL at 06:20

## 2024-09-05 RX ADMIN — Medication 2 MILLIGRAM(S): at 21:48

## 2024-09-05 RX ADMIN — HYDROMORPHONE HYDROCHLORIDE 30 MILLILITER(S): 2 TABLET ORAL at 03:57

## 2024-09-05 RX ADMIN — Medication 40 MILLIGRAM(S): at 21:05

## 2024-09-05 RX ADMIN — HYDROMORPHONE HYDROCHLORIDE 30 MILLILITER(S): 2 TABLET ORAL at 00:01

## 2024-09-05 RX ADMIN — Medication 1 SUPPOSITORY(S): at 21:05

## 2024-09-05 NOTE — PROGRESS NOTE ADULT - ASSESSMENT
39 y/o F with PMH of stage IV breast cancer s/p treatment, GARETT given gastric antral vascular ectasia admitted for acute on chronic anemia.     Acute on chronic blood loss Anemia  - with hx of GAVE recent admission s/p EGD with cauterization (7/31).   - H/H 5.9/19.7, FOBT positive   - received 2u PRBC 09/03  - post transfusion H/H is 8.6/27.5 (9/4)  - received 80mg IV protonix in the ED   - Continue IV Protonix 40mg BID  - GI recs appreciated  - Continue Anusol suppository nightly   - No plan for EGD at this time  - Transfuse 1 unit PRBC   - Monitor CBC     Metastatic Breast Cancer   - off Enhertu (last dose 6/19)  - Hem/ Onc following  - Outpatient follow up with Rad / Onc  - Pain control with Dilaudid po and IV, pt reports that PO Dilaudid ineffective --> PCA pain pump 9/3  - Valium PRN for muscle spasm and anxiety  - Pain management consult appreciated  - Continue Methadone    Anxiety/Mood Disorder  - Continue Fluoxetine, Olanzapine and Diazepam (PRN)    DVT PPx  -SCDs     Dispo: 48-72 hours, pending medical optimization

## 2024-09-05 NOTE — PROGRESS NOTE ADULT - ASSESSMENT
37yo F with PMHx of Stage 4 Breast Cancer s/p Taxotere/Herceptin with brain mets S/P WBRT now off Enhertu (Last Dose on 6/19), Chronic Iron Deficiency Anemia   presented to ED after referred by hematology for symptomatic anemia. Found to have hemoglobin 5.9 gm in ED.     #Symptomatic anemia  Hemoglobin 5.9 gm, normocytic. Noted brown stool with burgundy color on wipes. Received 2 units of PRBC, repeat hgb 8.6 gm.   EGD 07/31/2024 showed friable gastric mucosa, with oozing erosions which were oozing, also small erythematous spots in the antrum measuring 3-4 mm, s/p cauterization with APC.  S/p EGD with APC on 07/31/24  -Trend CBC, transfuse to Hgb 8 or higher, monitor for further bleeding   -continue Protonix 40 mg BID for GI mucosal cytoprotection   -Avoid use of NSAIDs  continue Anusol suppository at HS  -Continue home dose Octreotide for GAVE  -regular diet as tolerated   -No plan for endoscopic evaluation  Further care per primary team  _________________________________________________________________  Assessment and recommendations are final when note is signed by the attending physician.

## 2024-09-05 NOTE — PROGRESS NOTE ADULT - NS ATTEND AMEND GEN_ALL_CORE FT
I agree with assessment and plan as above. Pt is well known to GI service, again being followed for symptomatic anemia. She was found to have Hgb of 5.9, s/p 2u pRBC in ED with appropriate response. She has had multiple EGDs, most recently with oozing erosions which were treated with APC. She also had colonoscopy in 11/2023 which showed internal hemorrhoids. She has not had any GI bleeding today. Continue octreotide, anusol suppositories and PPI. Continue to trend Hgb and transfuse as needed. No repeat endoscopies planned at this time.

## 2024-09-05 NOTE — PROGRESS NOTE ADULT - SUBJECTIVE AND OBJECTIVE BOX
Chief Complaint:  Patient is a 38y old  Female who presents with a chief complaint of Acute on chronic blood loss Anemia (04 Sep 2024 15:20)      HPI/ 24 hr events: Patient seen and examined at bedside. Pt feeling well this morning. Her abdominal pain is better. She is tolerating liquid diet but would like to trial regular. No BMs yet, no further bleeding noted. Denies nausea, vomiting, diarrhea, hematemesis, hematochezia, melena. Vitals are overall stable, slight decrease in hgb from 8.6 to 7.7.      REVIEW OF SYSTEMS:   General: Negative  HEENT: Negative  CV: Negative  Respiratory: Negative  GI: See HPI  : Negative  MSK: Negative  Hematologic: Negative  Skin: Negative    MEDICATIONS:   MEDICATIONS  (STANDING):  chlorhexidine 2% Cloths 1 Application(s) Topical daily  FLUoxetine 40 milliGRAM(s) Oral at bedtime  hydrocortisone hemorrhoidal Suppository 1 Suppository(s) Rectal at bedtime  HYDROmorphone PCA (1 mG/mL) 30 milliLiter(s) PCA Continuous PCA Continuous  lactulose Syrup 20 Gram(s) Oral three times a day  memantine 10 milliGRAM(s) Oral two times a day  methadone    Tablet 15 milliGRAM(s) Oral at bedtime  methadone    Tablet 10 milliGRAM(s) Oral <User Schedule>  OLANZapine 2.5 milliGRAM(s) Oral at bedtime  pantoprazole  Injectable 40 milliGRAM(s) IV Push every 12 hours  pregabalin 200 milliGRAM(s) Oral three times a day    MEDICATIONS  (PRN):  diazepam    Tablet 2 milliGRAM(s) Oral two times a day PRN for muscle spasm, anxiety  naloxone Injectable 0.1 milliGRAM(s) IV Push every 3 minutes PRN For ANY of the following changes in patient status:  A. RR LESS THAN 10 breaths per minute, B. Oxygen saturation LESS THAN 90%, C. Sedation score of 6  ondansetron Injectable 4 milliGRAM(s) IV Push every 8 hours PRN Nausea and/or Vomiting      Packed Red Cells Order:  1 Unit  Indication: Anemia due to Active Hemorrhage - Medical Conditions e.  Infuse Unit : 3.5 Hours (09-05-24 @ 07:27)  Packed Red Cells Order:  1 Unit  Indication: Hgb <7 gm/dL  Infuse Unit : 3 Hours (09-03-24 @ 17:49)  Packed Red Cells Order:  1 Unit  Indication: Hgb <7 gm/dL  Infuse Unit : 3 Hours (09-03-24 @ 17:49)        DIET:  Diet, Regular (09-05-24 @ 09:05) [Active]          ALLERGIES:   Allergies    pertuzumab (Other (Severe))  Perjeta (Other (Severe))    Intolerances        VITAL SIGNS:   Vital Signs Last 24 Hrs  T(C): 36.5 (05 Sep 2024 09:01), Max: 37.2 (04 Sep 2024 16:51)  T(F): 97.7 (05 Sep 2024 09:01), Max: 99 (04 Sep 2024 16:51)  HR: 58 (05 Sep 2024 09:01) (53 - 82)  BP: 98/62 (05 Sep 2024 09:01) (94/65 - 114/68)  BP(mean): --  RR: 17 (05 Sep 2024 09:01) (17 - 18)  SpO2: 99% (05 Sep 2024 09:01) (93% - 99%)    Parameters below as of 05 Sep 2024 09:01  Patient On (Oxygen Delivery Method): room air      I&O's Summary      PHYSICAL EXAM:   GENERAL:  No acute distress  HEENT:  NC/AT, conjunctiva clear, sclera anicteric  CHEST:  No increased effort  HEART:  Regular rate  ABDOMEN:  Soft, mild lower abdominal tenderness, non-distended, normoactive bowel sounds, no rebound or guarding  EXTREMITIES: No edema  SKIN:  Warm, dry  NEURO:  Calm, cooperative    LABS:                        7.7    2.44  )-----------( 155      ( 05 Sep 2024 05:31 )             25.1     Hemoglobin: 7.7 g/dL (09-05-24 @ 05:31)  Hemoglobin: 8.6 g/dL (09-04-24 @ 16:40)  Hemoglobin: 8.6 g/dL (09-04-24 @ 06:35)  Hemoglobin: 5.9 g/dL (09-03-24 @ 17:15)    09-05    142  |  108  |  9.5  ----------------------------<  85  3.7   |  25.0  |  0.46<L>    Ca    8.2<L>      05 Sep 2024 05:31  Phos  4.1     09-05  Mg     2.0     09-05    TPro  5.1<L>  /  Alb  2.9<L>  /  TBili  0.8  /  DBili  x   /  AST  42<H>  /  ALT  23  /  AlkPhos  241<H>  09-03    LIVER FUNCTIONS - ( 03 Sep 2024 18:02 )  Alb: 2.9 g/dL / Pro: 5.1 g/dL / ALK PHOS: 241 U/L / ALT: 23 U/L / AST: 42 U/L / GGT: x             PT/INR - ( 03 Sep 2024 18:02 )   PT: 13.1 sec;   INR: 1.19 ratio         PTT - ( 03 Sep 2024 18:02 )  PTT:121.6 sec          RADIOLOGY & ADDITIONAL STUDIES:

## 2024-09-05 NOTE — PROGRESS NOTE ADULT - SUBJECTIVE AND OBJECTIVE BOX
38-year-old female who follows with Dr Chapa/OUMOU for a history of metastatic breast Ca S/P Taxoetere/Herceptin with brain mets S/P WBRT S/P Enhertu LD 6/19 now on hold and plan to switch to Kadcyla once counts stabilize Also with a history of GARETT due to GAVE with frequent GIB S/p Sandostatin LA 8/19/24, will switch to SA octreotide 9/19/24 given cost  and iv iron weekly LD 8/29  and procrit LD  Pt reports she has had rectal bleeding for a few weeks since her discharged from the hospital. She reports to feeling very tired reason went to her oncologist who check her hemoglobin which was 5.8     9/5/24 PCA initiated with good results reports pain controlled     PAST MEDICAL & SURGICAL HISTORY:  H/O compression fracture of spine  Anxiety  Metastatic breast cancer  H/O pleural effusion  Pericardial effusion  S/P tonsillectomy  H/O chest tube placement  12/23/21  S/P pericardiocentesis  12/28/21    Allergies  pertuzumab (Other (Severe))  Perjeta (Other (Severe))    MEDICATIONS  (STANDING):  chlorhexidine 2% Cloths 1 Application(s) Topical daily  FLUoxetine 40 milliGRAM(s) Oral at bedtime  hydrocortisone hemorrhoidal Suppository 1 Suppository(s) Rectal at bedtime  HYDROmorphone PCA (1 mG/mL) 30 milliLiter(s) PCA Continuous PCA Continuous  lactulose Syrup 20 Gram(s) Oral three times a day  memantine 10 milliGRAM(s) Oral two times a day  methadone    Tablet 15 milliGRAM(s) Oral at bedtime  methadone    Tablet 10 milliGRAM(s) Oral <User Schedule>  OLANZapine 2.5 milliGRAM(s) Oral at bedtime  pantoprazole  Injectable 40 milliGRAM(s) IV Push every 12 hours  pregabalin 200 milliGRAM(s) Oral three times a day    MEDICATIONS  (PRN):  diazepam    Tablet 2 milliGRAM(s) Oral two times a day PRN for muscle spasm, anxiety  naloxone Injectable 0.1 milliGRAM(s) IV Push every 3 minutes PRN For ANY of the following changes in patient status:  A. RR LESS THAN 10 breaths per minute, B. Oxygen saturation LESS THAN 90%, C. Sedation score of 6  ondansetron Injectable 4 milliGRAM(s) IV Push every 8 hours PRN Nausea and/or Vomiting    Vital Signs Last 24 Hrs  T(C): 36.5 (05 Sep 2024 09:01), Max: 37.2 (04 Sep 2024 16:51)  T(F): 97.7 (05 Sep 2024 09:01), Max: 99 (04 Sep 2024 16:51)  HR: 58 (05 Sep 2024 09:01) (53 - 82)  BP: 98/62 (05 Sep 2024 09:01) (94/65 - 114/68)  BP(mean): --  RR: 17 (05 Sep 2024 09:01) (17 - 18)  SpO2: 99% (05 Sep 2024 09:01) (93% - 99%)    Parameters below as of 05 Sep 2024 09:01  Patient On (Oxygen Delivery Method): room air      PE  GENERAL:  no acute distress, appropriate, pleasant  EYES: sclera clear,   ENMT: oropharynx clear without erythema, no exudates, moist mucous membranes  NECK: supple, soft,   LUNGS: good air entry bilaterally, clear to auscultation,    HEART: soft S1/S2, regular rate and rhythm,    GASTROINTESTINAL: abdomen is soft,   INTEGUMENT: good skin turgor, warm and dry  MUSCULOSKELETAL: no clubbing or cyanosis, no obvious deformity  NEUROLOGIC: awake, alert, oriented x3,   PSYCHIATRIC: mood is good, affect is congruent, linear and logical thought process    CBC                          7.7    2.44  )-----------( 155      ( 05 Sep 2024 05:31 )             25.1                           8.6    2.87  )-----------( 154      ( 04 Sep 2024 06:35 )             27.5     CHEM    09-05    142  |  108  |  9.5  ----------------------------<  85  3.7   |  25.0  |  0.46<L>    Ca    8.2<L>      05 Sep 2024 05:31  Phos  4.1     09-05  Mg     2.0     09-05    TPro  5.1<L>  /  Alb  2.9<L>  /  TBili  0.8  /  DBili  x   /  AST  42<H>  /  ALT  23  /  AlkPhos  241<H>  09-03      09-04    141  |  106  |  12.9  ----------------------------<  102<H>  3.9   |  24.0  |  0.52    Ca    8.4      04 Sep 2024 06:35    TPro  5.1<L>  /  Alb  2.9<L>  /  TBili  0.8  /  DBili  x   /  AST  42<H>  /  ALT  23  /  AlkPhos  241<H>  09-03

## 2024-09-05 NOTE — PROGRESS NOTE ADULT - SUBJECTIVE AND OBJECTIVE BOX
Interval Hx: PCA pump placed by primary team, pain is well controlled  Patient seen during rounds  Patient reports pain to be controlled on current medications  Patient denies sedation with medications      Analgesic Dosing for past 24 hours reviewed as below:    FLUoxetine   40 milliGRAM(s) Oral (09-04-24 @ 22:57)    HYDROmorphone  Injectable   1 milliGRAM(s) IV Push (09-04-24 @ 12:09)    memantine   10 milliGRAM(s) Oral (09-05-24 @ 06:17)   10 milliGRAM(s) Oral (09-04-24 @ 17:10)    methadone    Tablet   15 milliGRAM(s) Oral (09-04-24 @ 23:00)    methadone    Tablet   10 milliGRAM(s) Oral (09-05-24 @ 08:57)   10 milliGRAM(s) Oral (09-04-24 @ 13:15)    OLANZapine   2.5 milliGRAM(s) Oral (09-04-24 @ 22:57)    pregabalin   200 milliGRAM(s) Oral (09-05-24 @ 06:20)   200 milliGRAM(s) Oral (09-04-24 @ 23:00)   200 milliGRAM(s) Oral (09-04-24 @ 13:15)          T(C): 36.5 (09-05-24 @ 09:01), Max: 37.2 (09-04-24 @ 16:51)  HR: 58 (09-05-24 @ 09:01) (53 - 82)  BP: 98/62 (09-05-24 @ 09:01) (94/65 - 114/68)  RR: 17 (09-05-24 @ 09:01) (17 - 18)  SpO2: 99% (09-05-24 @ 09:01) (93% - 99%)        chlorhexidine 2% Cloths 1 Application(s) Topical daily  diazepam    Tablet 2 milliGRAM(s) Oral two times a day PRN  FLUoxetine 40 milliGRAM(s) Oral at bedtime  hydrocortisone hemorrhoidal Suppository 1 Suppository(s) Rectal at bedtime  HYDROmorphone PCA (1 mG/mL) 30 milliLiter(s) PCA Continuous PCA Continuous  memantine 10 milliGRAM(s) Oral two times a day  methadone    Tablet 15 milliGRAM(s) Oral at bedtime  methadone    Tablet 10 milliGRAM(s) Oral <User Schedule>  naloxone Injectable 0.1 milliGRAM(s) IV Push every 3 minutes PRN  OLANZapine 2.5 milliGRAM(s) Oral at bedtime  ondansetron Injectable 4 milliGRAM(s) IV Push every 8 hours PRN  pantoprazole  Injectable 40 milliGRAM(s) IV Push every 12 hours  polyethylene glycol 3350 17 Gram(s) Oral two times a day  pregabalin 200 milliGRAM(s) Oral three times a day                          7.7    2.44  )-----------( 155      ( 05 Sep 2024 05:31 )             25.1     09-05    142  |  108  |  9.5  ----------------------------<  85  3.7   |  25.0  |  0.46<L>    Ca    8.2<L>      05 Sep 2024 05:31  Phos  4.1     09-05  Mg     2.0     09-05    TPro  5.1<L>  /  Alb  2.9<L>  /  TBili  0.8  /  DBili  x   /  AST  42<H>  /  ALT  23  /  AlkPhos  241<H>  09-03    PT/INR - ( 03 Sep 2024 18:02 )   PT: 13.1 sec;   INR: 1.19 ratio         PTT - ( 03 Sep 2024 18:02 )  PTT:121.6 sec  Urinalysis Basic - ( 05 Sep 2024 05:31 )    Color: x / Appearance: x / SG: x / pH: x  Gluc: 85 mg/dL / Ketone: x  / Bili: x / Urobili: x   Blood: x / Protein: x / Nitrite: x   Leuk Esterase: x / RBC: x / WBC x   Sq Epi: x / Non Sq Epi: x / Bacteria: x        Pain Service   [phone]

## 2024-09-05 NOTE — PROGRESS NOTE ADULT - ASSESSMENT
38-year-old female who follows with Dr Chapa/OUMOU for a history of metastatic breast Ca S/P Taxoetere/Herceptin with brain mets S/P WBRT S/P Enhertu LD 6/19 now on hold and plan to switch to Kadcyla once counts stabilize Also with a history of GARETT due to GAVE with frequent GIB S/p Sandostatin LA 8/19/24, will switch to SA octreotide 9/19/24 given cost  and iv iron weekly LD 8/29  and procrit LD  Pt reports she has had rectal bleeding for a few weeks since her discharged from the hospital. She reports to feeling very tired reason went to her oncologist who check her hemoglobin which was 5.8     history of metastatic breast Ca   - Follows Dr Chapa/OUMOU    - S/P Taxoetere/Herceptin   - S/P WBRT for brain mets  -S/P Enhertu LD 6/19 now on hold   - plan to switch to Kadcyla once counts stabilize   - WBC 2.44 not neutropenic Plt 154    GARETT due to GAVE  - Hgb 5.9 on admission  - S/P 2 UPRBC   - Hgb now 7.7  - frequent GIB   - S/p Sandostatin LA 8/19/24, will switch to SA octreotide 9/19/24 given cost   - S/P iv iron weekly LD 8/29    - receives procrit  weekly  -received 80mg IV protonix in the ED   -cont with Protonix 40mg IV BID  - + occult blood   -GI following - S/p EGD with APC on 07/31/24 Avoid use of NSAIDs  add Anusol suppository at HS No plan for endoscopic evaluation    Pain management following -PCA initiated -cont home methadone 10/10//15    Will follow

## 2024-09-05 NOTE — PROGRESS NOTE ADULT - ASSESSMENT
39 y/o female with hx of Stage-4 Breast cancer with diffuse osseous mets, on home methadone for chronic pain    dPCA 0/0.3/8/6    cont home methadone 10/10//15        when due for discharge:  DC PCA  - Recommend starting dilaudid PO 4mg/8mg l7gmgnt PRN mod/severe pain

## 2024-09-05 NOTE — PROGRESS NOTE ADULT - SUBJECTIVE AND OBJECTIVE BOX
Chief complaint: Anemia    Patient seen and examined at bedside. No acute overnight events reported. Patient states she is feeling well, currently no complaints. Pain is controlled on PCA. No chest pain, shortness of breath.     Vital Signs Last 24 Hrs  T(F): 97.7 (05 Sep 2024 09:01), Max: 99 (04 Sep 2024 16:51)  HR: 58 (05 Sep 2024 09:01) (53 - 82)  BP: 98/62 (05 Sep 2024 09:01) (94/65 - 114/68)  RR: 17 (05 Sep 2024 09:01) (17 - 18)  SpO2: 99% (05 Sep 2024 09:01) (93% - 99%)    Physical Exam:  Constitutional: alert and oriented, in no acute distress   Neck: Soft and supple  Respiratory: Clear to auscultation bilaterally  Cardiovascular: Regular rate and rhyhtm  Gastrointestinal: Soft, non-tender to palpation, +bs  Vascular: 2+ peripheral pulses  Neurological: A/O x 3  Musculoskeletal: 5/5 strength b/l upper and lower extremities, no lower extremity edema bilaterally    Labs:                        7.7    2.44  )-----------( 155      ( 05 Sep 2024 05:31 )             25.1   09-05    142  |  108  |  9.5  ----------------------------<  85  3.7   |  25.0  |  0.46<L>    Ca    8.2<L>      05 Sep 2024 05:31  Phos  4.1     09-05  Mg     2.0     09-05    TPro  5.1<L>  /  Alb  2.9<L>  /  TBili  0.8  /  DBili  x   /  AST  42<H>  /  ALT  23  /  AlkPhos  241<H>  09-03

## 2024-09-06 ENCOUNTER — TRANSCRIPTION ENCOUNTER (OUTPATIENT)
Age: 39
End: 2024-09-06

## 2024-09-06 LAB
ANION GAP SERPL CALC-SCNC: 8 MMOL/L — SIGNIFICANT CHANGE UP (ref 5–17)
ANISOCYTOSIS BLD QL: SLIGHT — SIGNIFICANT CHANGE UP
BASOPHILS # BLD AUTO: 0 K/UL — SIGNIFICANT CHANGE UP (ref 0–0.2)
BASOPHILS NFR BLD AUTO: 0 % — SIGNIFICANT CHANGE UP (ref 0–2)
BUN SERPL-MCNC: 12.3 MG/DL — SIGNIFICANT CHANGE UP (ref 8–20)
CALCIUM SERPL-MCNC: 8.1 MG/DL — LOW (ref 8.4–10.5)
CHLORIDE SERPL-SCNC: 107 MMOL/L — SIGNIFICANT CHANGE UP (ref 96–108)
CO2 SERPL-SCNC: 25 MMOL/L — SIGNIFICANT CHANGE UP (ref 22–29)
CREAT SERPL-MCNC: 0.44 MG/DL — LOW (ref 0.5–1.3)
DACRYOCYTES BLD QL SMEAR: SLIGHT — SIGNIFICANT CHANGE UP
EGFR: 128 ML/MIN/1.73M2 — SIGNIFICANT CHANGE UP
EOSINOPHIL # BLD AUTO: 0.02 K/UL — SIGNIFICANT CHANGE UP (ref 0–0.5)
EOSINOPHIL NFR BLD AUTO: 0.9 % — SIGNIFICANT CHANGE UP (ref 0–6)
GIANT PLATELETS BLD QL SMEAR: PRESENT — SIGNIFICANT CHANGE UP
GLUCOSE SERPL-MCNC: 118 MG/DL — HIGH (ref 70–99)
HCT VFR BLD CALC: 28.1 % — LOW (ref 34.5–45)
HGB BLD-MCNC: 8.8 G/DL — LOW (ref 11.5–15.5)
LYMPHOCYTES # BLD AUTO: 0.27 K/UL — LOW (ref 1–3.3)
LYMPHOCYTES # BLD AUTO: 10.9 % — LOW (ref 13–44)
MACROCYTES BLD QL: SLIGHT — SIGNIFICANT CHANGE UP
MANUAL SMEAR VERIFICATION: SIGNIFICANT CHANGE UP
MCHC RBC-ENTMCNC: 29.8 PG — SIGNIFICANT CHANGE UP (ref 27–34)
MCHC RBC-ENTMCNC: 31.3 GM/DL — LOW (ref 32–36)
MCV RBC AUTO: 95.3 FL — SIGNIFICANT CHANGE UP (ref 80–100)
MICROCYTES BLD QL: SLIGHT — SIGNIFICANT CHANGE UP
MONOCYTES # BLD AUTO: 0.2 K/UL — SIGNIFICANT CHANGE UP (ref 0–0.9)
MONOCYTES NFR BLD AUTO: 8.2 % — SIGNIFICANT CHANGE UP (ref 2–14)
NEUTROPHILS # BLD AUTO: 1.86 K/UL — SIGNIFICANT CHANGE UP (ref 1.8–7.4)
NEUTROPHILS NFR BLD AUTO: 75.5 % — SIGNIFICANT CHANGE UP (ref 43–77)
OVALOCYTES BLD QL SMEAR: SIGNIFICANT CHANGE UP
PLAT MORPH BLD: NORMAL — SIGNIFICANT CHANGE UP
PLATELET # BLD AUTO: 143 K/UL — LOW (ref 150–400)
POLYCHROMASIA BLD QL SMEAR: SLIGHT — SIGNIFICANT CHANGE UP
POTASSIUM SERPL-MCNC: 4 MMOL/L — SIGNIFICANT CHANGE UP (ref 3.5–5.3)
POTASSIUM SERPL-SCNC: 4 MMOL/L — SIGNIFICANT CHANGE UP (ref 3.5–5.3)
RBC # BLD: 2.95 M/UL — LOW (ref 3.8–5.2)
RBC # FLD: 18.5 % — HIGH (ref 10.3–14.5)
RBC BLD AUTO: ABNORMAL
SMUDGE CELLS # BLD: PRESENT — SIGNIFICANT CHANGE UP
SODIUM SERPL-SCNC: 140 MMOL/L — SIGNIFICANT CHANGE UP (ref 135–145)
VARIANT LYMPHS # BLD: 4.5 % — SIGNIFICANT CHANGE UP (ref 0–6)
WBC # BLD: 2.47 K/UL — LOW (ref 3.8–10.5)
WBC # FLD AUTO: 2.47 K/UL — LOW (ref 3.8–10.5)

## 2024-09-06 PROCEDURE — 45330 DIAGNOSTIC SIGMOIDOSCOPY: CPT | Mod: 59

## 2024-09-06 PROCEDURE — 99233 SBSQ HOSP IP/OBS HIGH 50: CPT

## 2024-09-06 PROCEDURE — 43235 EGD DIAGNOSTIC BRUSH WASH: CPT

## 2024-09-06 DEVICE — NAIL OSTEO 1.5X16MM STRL: Type: IMPLANTABLE DEVICE | Status: FUNCTIONAL

## 2024-09-06 RX ADMIN — Medication 1 SUPPOSITORY(S): at 21:26

## 2024-09-06 RX ADMIN — OLANZAPINE 2.5 MILLIGRAM(S): 7.5 TABLET ORAL at 21:26

## 2024-09-06 RX ADMIN — PREGABALIN 200 MILLIGRAM(S): 75 CAPSULE ORAL at 21:27

## 2024-09-06 RX ADMIN — MEMANTINE 10 MILLIGRAM(S): 7 CAPSULE, EXTENDED RELEASE ORAL at 05:31

## 2024-09-06 RX ADMIN — Medication 40 MILLIGRAM(S): at 05:32

## 2024-09-06 RX ADMIN — HYDROMORPHONE HYDROCHLORIDE 30 MILLILITER(S): 2 TABLET ORAL at 09:05

## 2024-09-06 RX ADMIN — METHADONE HYDROCHLORIDE 15 MILLIGRAM(S): 1 POWDER ORAL at 21:26

## 2024-09-06 RX ADMIN — Medication 40 MILLIGRAM(S): at 21:26

## 2024-09-06 RX ADMIN — PREGABALIN 200 MILLIGRAM(S): 75 CAPSULE ORAL at 05:31

## 2024-09-06 RX ADMIN — Medication 1 PATCH: at 12:54

## 2024-09-06 RX ADMIN — Medication 1 PATCH: at 08:19

## 2024-09-06 RX ADMIN — Medication 2 MILLIGRAM(S): at 21:29

## 2024-09-06 RX ADMIN — MEMANTINE 10 MILLIGRAM(S): 7 CAPSULE, EXTENDED RELEASE ORAL at 18:06

## 2024-09-06 RX ADMIN — HYDROMORPHONE HYDROCHLORIDE 30 MILLILITER(S): 2 TABLET ORAL at 07:24

## 2024-09-06 RX ADMIN — CHLORHEXIDINE GLUCONATE 1 APPLICATION(S): 40 SOLUTION TOPICAL at 12:55

## 2024-09-06 RX ADMIN — Medication 1 PATCH: at 19:56

## 2024-09-06 RX ADMIN — Medication 40 MILLIGRAM(S): at 18:07

## 2024-09-06 RX ADMIN — HYDROMORPHONE HYDROCHLORIDE 30 MILLILITER(S): 2 TABLET ORAL at 19:42

## 2024-09-06 NOTE — CHART NOTE - NSCHARTNOTEFT_GEN_A_CORE
Reported several episodes of rectal bleeding this admission. Hemoglobin 8.8 gm this morning. (5.9-->8.6-->8.6-->7.7--->10-->8.8). Received 3 units of PRBC this admission.   Will plan for EGD and Flex sigmoidoscopy today  Please keep NPO for procedure  Please give tap water enema 1000 ml X 1 for Flex sigmoidoscopy
Flex sig - brown . green stool. No melena. + external hemorrhoid. Scope was passed to 20 cm from the anal verge   EGD - mild antral gastritis ( spotty erythema in the antrum )  anusol HX cream Bid    NO bleeding   PPI qd  regular diet  Pt can be discharge

## 2024-09-06 NOTE — DISCHARGE NOTE PROVIDER - HOSPITAL COURSE
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 Chapa) with a hemoglobin of 5.9. Patient reports she has had rectal bleeding for a few weeks since her last discharge from the hospital. She reported feeling very tired which is why she went to her oncologist who checked her hemoglobin. She states that she been off chemo since June due to anemia. Denies cp, sob, palpitations. Patient received a total of 3 units pRBCc this admission. Last EGD was 07/31 with two bleeding erosions that were cauterized. Patient was placed on Anusol suppository for internal hemorrhoids and underwent flexible sigmoidoscopy per GI which showed ___________. GI recommended ________. Pain management placed her on Dilaudid PCA 1mg/mL 0/0.3/8/6. Patient is medically cleared for discharge to home.     Important Medication Changes and Reason:  X  Continue all other medications as seen in medication reconciliation.    Active or Pending Issues Requiring Follow-up:    Advanced Directives:   [X] Full code/[ ] DNR /[ ]Hospice    Discharge Diagnoses:  Anemia secondary to acute on chronic blood loss  Metastatic Breast Cancer  Anxiety/Mood Disorder    Further Requests   Hospital Course: 39yo F with PMHx of Stage 4 Breast Cancer s/p Taxotere/Herceptin with brain mets S/P WBRT now off Enhertu (Last Dose on 6/19), Chronic Iron Deficiency Anemia likely due to Gastric Antral Vascular Ectasia (GAVE), Pericardial Effusion, Spinal Compression Fractures presented from her oncologist office (Dr. Chapa) with a hemoglobin of 5.9. Patient reports she has had rectal bleeding for a few weeks since her last discharge from the hospital. She reported feeling very tired which is why she went to her oncologist who checked her hemoglobin. She states that she been off chemo since June due to anemia. Denies cp, sob, palpitations. Patient received a total of 3 units pRBCc this admission. Last EGD was 07/31 with two bleeding erosions that were cauterized. Patient was placed on Anusol suppository for internal hemorrhoids and underwent flexible sigmoidoscopy per GI which showed mild antral gastritis. GI recommended PPI . Pain management placed her on Dilaudid PCA 1mg/mL 0/0.3/8/6. PCA switched to oral Hydromorphone, home doses.  Patient is medically cleared for discharge to home.

## 2024-09-06 NOTE — PROGRESS NOTE ADULT - ASSESSMENT
38-year-old female who follows with Dr Chapa/OUMOU for a history of metastatic breast Ca S/P Taxoetere/Herceptin with brain mets S/P WBRT S/P Enhertu LD 6/19 now on hold and plan to switch to Kadcyla once counts stabilize Also with a history of GARETT due to GAVE with frequent GIB S/p Sandostatin LA 8/19/24, will switch to SA octreotide 9/19/24 given cost  and iv iron weekly LD 8/29  and procrit LD  Pt reports she has had rectal bleeding for a few weeks since her discharged from the hospital. She reports to feeling very tired reason went to her oncologist who check her hemoglobin which was 5.8     history of metastatic breast Ca   - Follows Dr Chapa/OUMOU    - S/P Taxoetere/Herceptin   - S/P WBRT for brain mets  -S/P Enhertu LD 6/19 now on hold   - plan to switch to Kadcyla once counts stabilize   - WBC 2.47 not neutropenic Plt 143k    GARETT due to GAVE  - Hgb 5.9 on admission  - S/P 3 UPRBC total  - Hgb now  8.8  - frequent GIB   - S/p Sandostatin LA 8/19/24, will switch to SA octreotide 9/19/24 given cost   - S/P iv iron weekly LD 8/29    - Will give 1 dose IV today  - receives procrit  weekly  -received 80mg IV protonix in the ED   -cont with Protonix 40mg IV BID  - + occult blood   -GI following - S/p EGD with APC on 07/31/24 Avoid use of NSAIDs  add Anusol suppository at HS Will plan for EGD and Flex sigmoidoscopy today    Pain management following -PCA initiated -cont home methadone 10/10//15    Will follow

## 2024-09-06 NOTE — PATIENT PROFILE ADULT - FALL HARM RISK - HARM RISK INTERVENTIONS

## 2024-09-06 NOTE — DISCHARGE NOTE PROVIDER - CARE PROVIDER_API CALL
Mable Connelly  Gastroenterology  [address], [address]  [place] [address]  Phone: [phone]  Fax: [phone]  Follow Up Time:

## 2024-09-06 NOTE — PROGRESS NOTE ADULT - SUBJECTIVE AND OBJECTIVE BOX
38-year-old female who follows with Dr Chapa/OUMOU for a history of metastatic breast Ca S/P Taxoetere/Herceptin with brain mets S/P WBRT S/P Enhertu LD 6/19 now on hold and plan to switch to Kadcyla once counts stabilize Also with a history of GARETT due to GAVE with frequent GIB S/p Sandostatin LA 8/19/24, will switch to SA octreotide 9/19/24 given cost  and iv iron weekly LD 8/29  and procrit LD  Pt reports she has had rectal bleeding for a few weeks since her discharged from the hospital. She reports to feeling very tired reason went to her oncologist who check her hemoglobin which was 5.8     9/6/24 pain controlled reports stomach cramping with dark stool with blood  S/P 1 UPRBC GI plan for EDG and flex sigmoid today    PAST MEDICAL & SURGICAL HISTORY:  H/O compression fracture of spine  Anxiety  Metastatic breast cancer  H/O pleural effusion  Pericardial effusion  S/P tonsillectomy  H/O chest tube placement  12/23/21  S/P pericardiocentesis  12/28/21    Allergies  pertuzumab (Other (Severe))  Perjeta (Other (Severe))    MEDICATIONS  (STANDING):  chlorhexidine 2% Cloths 1 Application(s) Topical daily  FLUoxetine 40 milliGRAM(s) Oral at bedtime  hydrocortisone hemorrhoidal Suppository 1 Suppository(s) Rectal at bedtime  HYDROmorphone PCA (1 mG/mL) 30 milliLiter(s) PCA Continuous PCA Continuous  lactulose Syrup 20 Gram(s) Oral three times a day  memantine 10 milliGRAM(s) Oral two times a day  methadone    Tablet 15 milliGRAM(s) Oral at bedtime  methadone    Tablet 10 milliGRAM(s) Oral <User Schedule>  OLANZapine 2.5 milliGRAM(s) Oral at bedtime  pantoprazole  Injectable 40 milliGRAM(s) IV Push every 12 hours  pregabalin 200 milliGRAM(s) Oral three times a day    MEDICATIONS  (PRN):  diazepam    Tablet 2 milliGRAM(s) Oral two times a day PRN for muscle spasm, anxiety  naloxone Injectable 0.1 milliGRAM(s) IV Push every 3 minutes PRN For ANY of the following changes in patient status:  A. RR LESS THAN 10 breaths per minute, B. Oxygen saturation LESS THAN 90%, C. Sedation score of 6  ondansetron Injectable 4 milliGRAM(s) IV Push every 8 hours PRN Nausea and/or Vomiting    Vital Signs Last 24 Hrs  T(C): 37 (06 Sep 2024 09:10), Max: 37 (05 Sep 2024 19:54)  T(F): 98.6 (06 Sep 2024 09:10), Max: 98.6 (05 Sep 2024 19:54)  HR: 87 (06 Sep 2024 09:10) (63 - 87)  BP: 117/79 (06 Sep 2024 09:10) (99/65 - 117/79)  BP(mean): --  RR: 18 (06 Sep 2024 09:10) (17 - 18)  SpO2: 96% (06 Sep 2024 09:10) (93% - 98%)    Parameters below as of 06 Sep 2024 09:10  Patient On (Oxygen Delivery Method): room air      PE  GENERAL:  no acute distress, appropriate, pleasant  EYES: sclera clear,   ENMT: oropharynx clear without erythema, no exudates, moist mucous membranes  NECK: supple, soft,   LUNGS: good air entry bilaterally, clear to auscultation,    HEART: soft S1/S2, regular rate and rhythm,    GASTROINTESTINAL: abdomen is soft,   INTEGUMENT: good skin turgor, warm and dry  MUSCULOSKELETAL: no clubbing or cyanosis, no obvious deformity  NEUROLOGIC: awake, alert, oriented x3,   PSYCHIATRIC: mood is good, affect is congruent, linear and logical thought process    CBC                          8.8    2.47  )-----------( 143      ( 06 Sep 2024 05:04 )             28.1                           7.7    2.44  )-----------( 155      ( 05 Sep 2024 05:31 )             25.1                           8.6    2.87  )-----------( 154      ( 04 Sep 2024 06:35 )             27.5     CHEM    09-06    140  |  107  |  12.3  ----------------------------<  118<H>  4.0   |  25.0  |  0.44<L>    Ca    8.1<L>      06 Sep 2024 05:04  Phos  4.1     09-05  Mg     2.0     09-05 09-05    142  |  108  |  9.5  ----------------------------<  85  3.7   |  25.0  |  0.46<L>    Ca    8.2<L>      05 Sep 2024 05:31  Phos  4.1     09-05  Mg     2.0     09-05    TPro  5.1<L>  /  Alb  2.9<L>  /  TBili  0.8  /  DBili  x   /  AST  42<H>  /  ALT  23  /  AlkPhos  241<H>  09-03 09-04    141  |  106  |  12.9  ----------------------------<  102<H>  3.9   |  24.0  |  0.52    Ca    8.4      04 Sep 2024 06:35    TPro  5.1<L>  /  Alb  2.9<L>  /  TBili  0.8  /  DBili  x   /  AST  42<H>  /  ALT  23  /  AlkPhos  241<H>  09-03

## 2024-09-06 NOTE — PROGRESS NOTE ADULT - TIME BILLING
Pain Management - chart review, patient interview
Pain Management - chart review, patient interview
Time spent reviewing the chart documentation, reviewing labs and imaging studies, evaluating the patient, discussing the plan of care with the consultants & medical team, and documenting.

## 2024-09-06 NOTE — PROGRESS NOTE ADULT - ASSESSMENT
37 y/o F with PMH of stage IV breast cancer s/p treatment, GARETT given gastric antral vascular ectasia admitted for acute on chronic anemia.     #Acute on chronic blood loss Anemia  - with hx of GAVE recent admission s/p EGD with cauterization (7/31).   - H/H 5.9/19.7, FOBT positive   - received 2u PRBC 09/03  - post transfusion H/H is 8.6/27.5 (9/4) --> 8.8/28.1 9/6  - received 80mg IV protonix in the ED   - Continue IV Protonix 40mg BID  - GI recs appreciated   - Flexible sigmoidoscopy 9/6 pending results  - Continue Anusol suppository nightly   - No plan for EGD at this time  - Received 1 unit PRBC 9/5  - Monitor CBC    #Metastatic Breast Cancer   - off Enhertu (last dose 6/19)  - Hem/ Onc following  - Outpatient follow up with Rad / Onc  - Pain control with Dilaudid po and IV, pt reports that PO Dilaudid ineffective --> PCA pain pump 9/3  - Valium PRN for muscle spasm and anxiety  - Pain management consult appreciated  - Continue Methadone    #Anxiety/Mood Disorder  - Continue Fluoxetine, Olanzapine and Diazepam (PRN)    DVT PPx  -SCDs     Dispo: 24-48 hours, pending GI clearance and medical optimization      39 y/o F with PMH of stage IV breast cancer s/p treatment, GARETT given gastric antral vascular ectasia admitted for acute on chronic anemia.     #Acute on chronic blood loss Anemia  - with hx of GAVE recent admission s/p EGD with cauterization (7/31).   - H/H 5.9/19.7, FOBT positive   - received 2u PRBC 09/03  - post transfusion H/H is 8.6/27.5 (9/4) --> 8.8/28.1 9/6  - received 80mg IV protonix in the ED   - Continue IV Protonix 40mg BID  - GI recs appreciated   - Flexible sigmoidoscopy/EGD 9/6 pending results  - Continue Anusol suppository nightly   - Received 1 unit PRBC 9/5  - Monitor CBC    #Metastatic Breast Cancer   - off Enhertu (last dose 6/19)  - Hem/ Onc following  - Outpatient follow up with Rad / Onc  - Pain control with Dilaudid po and IV, pt reports that PO Dilaudid ineffective --> PCA pain pump 9/3  - Valium PRN for muscle spasm and anxiety  - Pain management consult appreciated  - Continue Methadone    #Anxiety/Mood Disorder  - Continue Fluoxetine, Olanzapine and Diazepam (PRN)    DVT PPx  -SCDs     Dispo: 24-48 hours, pending GI procedure results/medical optimization

## 2024-09-06 NOTE — PROGRESS NOTE ADULT - SUBJECTIVE AND OBJECTIVE BOX
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 Chapa) with hemoglobin of 5.9. Pt reports she has had rectal bleeding for a few weeks since her discharged from the hospital. She reports to feeling very tired reason went to her oncologist who check her hemoglobin. She states that she been off chemo since June due to anemia. Denies cp, sob, palpitations. Pt received 2 units pRBCc 9/3, GI was consulted, reccs appreciated. Last EGD was 07/31 with two bleeding erosions that were cauterized. Pt received another unit of blood before GI performs flexible sigmoidoscopy 9/6 (total = 3 units). Pain management is following, reccs appreciated       INTERVAL HPI/LAST 24HRS EVENTS: Vitals remained stable overnight. No acute events overnight. Patient seen and examined at bedside this morning. She endorsed sufficient pain control on current medication regime. She endorses decreased blood in bowel movements, last noted overnight with a formed bloody stool. She is aware of flexible sigmoidoscopy planned for today. Denies any chest pain, palpitations, orthopnea, leg edema, hematemesis, or any other complaints.       MEDICATIONS  (STANDING):  chlorhexidine 2% Cloths 1 Application(s) Topical daily  FLUoxetine 40 milliGRAM(s) Oral at bedtime  hydrocortisone hemorrhoidal Suppository 1 Suppository(s) Rectal at bedtime  HYDROmorphone PCA (1 mG/mL) 30 milliLiter(s) PCA Continuous PCA Continuous  lidocaine   4% Patch 1 Patch Transdermal daily  memantine 10 milliGRAM(s) Oral two times a day  methadone    Tablet 15 milliGRAM(s) Oral at bedtime  methadone    Tablet 10 milliGRAM(s) Oral <User Schedule>  OLANZapine 2.5 milliGRAM(s) Oral at bedtime  pantoprazole  Injectable 40 milliGRAM(s) IV Push every 12 hours  pregabalin 200 milliGRAM(s) Oral three times a day    MEDICATIONS  (PRN):  diazepam    Tablet 2 milliGRAM(s) Oral two times a day PRN for muscle spasm, anxiety  naloxone Injectable 0.1 milliGRAM(s) IV Push every 3 minutes PRN For ANY of the following changes in patient status:  A. RR LESS THAN 10 breaths per minute, B. Oxygen saturation LESS THAN 90%, C. Sedation score of 6  ondansetron Injectable 4 milliGRAM(s) IV Push every 8 hours PRN Nausea and/or Vomiting      Allergies    pertuzumab (Other (Severe))  Perjeta (Other (Severe))    Intolerances x        REVIEW OF SYSTEMS: see interval hpi.     OBJECTIVE:    Vital Signs Last 24 Hrs  T(C): 37 (06 Sep 2024 09:10), Max: 37 (05 Sep 2024 19:54)  T(F): 98.6 (06 Sep 2024 09:10), Max: 98.6 (05 Sep 2024 19:54)  HR: 87 (06 Sep 2024 09:10) (63 - 87)  BP: 117/79 (06 Sep 2024 09:10) (99/65 - 117/79)  BP(mean): --  RR: 18 (06 Sep 2024 09:10) (17 - 18)  SpO2: 96% (06 Sep 2024 09:10) (93% - 98%)    Parameters below as of 06 Sep 2024 09:10  Patient On (Oxygen Delivery Method): room air        PHYSICAL EXAM:  Constitutional: alert and oriented, in no acute distress   Neck: Soft and supple  Respiratory: Clear to auscultation bilaterally  Cardiovascular: Regular rate and rhyhtm  Gastrointestinal: Soft, non-tender to palpation, +bs  Vascular: 2+ peripheral pulses  Neurological: A/O x 3  Musculoskeletal: 5/5 strength b/l upper and lower extremities, no lower extremity edema bilaterally      Lab/ Imaging:    LABS:                        8.8    2.47  )-----------( 143      ( 06 Sep 2024 05:04 )             28.1     09-06    140  |  107  |  12.3  ----------------------------<  118<H>  4.0   |  25.0  |  0.44<L>    Ca    8.1<L>      06 Sep 2024 05:04  Phos  4.1     09-05  Mg     2.0     09-05        Urinalysis Basic - ( 06 Sep 2024 05:04 )    Color: x / Appearance: x / SG: x / pH: x  Gluc: 118 mg/dL / Ketone: x  / Bili: x / Urobili: x   Blood: x / Protein: x / Nitrite: x   Leuk Esterase: x / RBC: x / WBC x   Sq Epi: x / Non Sq Epi: x / Bacteria: x     SUBJECTIVE:    Chief Complaint: Patient is a 38y old  Female who presents with a chief complaint of Acute on chronic blood loss Anemia (06 Sep 2024 10:26)      BRIEF HOSPITAL COURSE: 37yo F with PMHx of Stage 4 Breast Cancer s/p Taxotere/Herceptin with brain mets S/P WBRT now off Enhertu (Last Dose on 6/19), Chronic Iron Deficiency Anemia 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 Chapa) with hemoglobin of 5.9. Pt reports she has had rectal bleeding for a few weeks since her discharged from the hospital. She reports to feeling very tired reason went to her oncologist who check her hemoglobin. She states that she been off chemo since June due to anemia. Denies cp, sob, palpitations. Pt received 2 units pRBCc 9/3, GI was consulted, reccs appreciated. Last EGD was 07/31 with two bleeding erosions that were cauterized. Pt received another unit of blood before GI performs flexible sigmoidoscopy 9/6 (total = 3 units). Pain management is following, reccs appreciated       INTERVAL HPI/LAST 24HRS EVENTS: Vitals remained stable overnight. No acute events overnight. Patient seen and examined at bedside this morning. She endorsed sufficient pain control on current medication regime. She endorses decreased blood in bowel movements, last noted overnight with a formed bloody stool. She is aware of flexible sigmoidoscopy planned for today. GI also planning on EGD. Denies any chest pain, palpitations, orthopnea, leg edema, hematemesis, or any other complaints.       MEDICATIONS  (STANDING):  chlorhexidine 2% Cloths 1 Application(s) Topical daily  FLUoxetine 40 milliGRAM(s) Oral at bedtime  hydrocortisone hemorrhoidal Suppository 1 Suppository(s) Rectal at bedtime  HYDROmorphone PCA (1 mG/mL) 30 milliLiter(s) PCA Continuous PCA Continuous  lidocaine   4% Patch 1 Patch Transdermal daily  memantine 10 milliGRAM(s) Oral two times a day  methadone    Tablet 15 milliGRAM(s) Oral at bedtime  methadone    Tablet 10 milliGRAM(s) Oral <User Schedule>  OLANZapine 2.5 milliGRAM(s) Oral at bedtime  pantoprazole  Injectable 40 milliGRAM(s) IV Push every 12 hours  pregabalin 200 milliGRAM(s) Oral three times a day    MEDICATIONS  (PRN):  diazepam    Tablet 2 milliGRAM(s) Oral two times a day PRN for muscle spasm, anxiety  naloxone Injectable 0.1 milliGRAM(s) IV Push every 3 minutes PRN For ANY of the following changes in patient status:  A. RR LESS THAN 10 breaths per minute, B. Oxygen saturation LESS THAN 90%, C. Sedation score of 6  ondansetron Injectable 4 milliGRAM(s) IV Push every 8 hours PRN Nausea and/or Vomiting      Allergies    pertuzumab (Other (Severe))  Perjeta (Other (Severe))    Intolerances x        REVIEW OF SYSTEMS: see interval hpi.     OBJECTIVE:    Vital Signs Last 24 Hrs  T(C): 37 (06 Sep 2024 09:10), Max: 37 (05 Sep 2024 19:54)  T(F): 98.6 (06 Sep 2024 09:10), Max: 98.6 (05 Sep 2024 19:54)  HR: 87 (06 Sep 2024 09:10) (63 - 87)  BP: 117/79 (06 Sep 2024 09:10) (99/65 - 117/79)  BP(mean): --  RR: 18 (06 Sep 2024 09:10) (17 - 18)  SpO2: 96% (06 Sep 2024 09:10) (93% - 98%)    Parameters below as of 06 Sep 2024 09:10  Patient On (Oxygen Delivery Method): room air        PHYSICAL EXAM:  Constitutional: alert and oriented, in no acute distress   Neck: Soft and supple  Respiratory: Clear to auscultation bilaterally  Cardiovascular: Regular rate and rhyhtm  Gastrointestinal: Soft, non-tender to palpation, +bs  Vascular: 2+ peripheral pulses  Neurological: A/O x 3  Musculoskeletal: 5/5 strength b/l upper and lower extremities, no lower extremity edema bilaterally      Lab/ Imaging:    LABS:                        8.8    2.47  )-----------( 143      ( 06 Sep 2024 05:04 )             28.1     09-06    140  |  107  |  12.3  ----------------------------<  118<H>  4.0   |  25.0  |  0.44<L>    Ca    8.1<L>      06 Sep 2024 05:04  Phos  4.1     09-05  Mg     2.0     09-05

## 2024-09-06 NOTE — PROGRESS NOTE ADULT - SUBJECTIVE AND OBJECTIVE BOX
Interval Hx: no overnight events, pain controlled with PCA  Patient seen during rounds  Patient reports pain to be controlled on current medications  Patient denies sedation with medications      Analgesic Dosing for past 24 hours reviewed as below:  diazepam    Tablet   2 milliGRAM(s) Oral (09-05-24 @ 21:48)    FLUoxetine   40 milliGRAM(s) Oral (09-05-24 @ 21:05)    memantine   10 milliGRAM(s) Oral (09-06-24 @ 05:31)   10 milliGRAM(s) Oral (09-05-24 @ 17:28)    methadone    Tablet   15 milliGRAM(s) Oral (09-05-24 @ 21:05)    methadone    Tablet   10 milliGRAM(s) Oral (09-05-24 @ 13:01)    OLANZapine   2.5 milliGRAM(s) Oral (09-05-24 @ 21:06)    pregabalin   200 milliGRAM(s) Oral (09-06-24 @ 05:31)   200 milliGRAM(s) Oral (09-05-24 @ 21:05)   200 milliGRAM(s) Oral (09-05-24 @ 13:01)          T(C): 37 (09-06-24 @ 09:10), Max: 37 (09-05-24 @ 19:54)  HR: 87 (09-06-24 @ 09:10) (63 - 87)  BP: 117/79 (09-06-24 @ 09:10) (99/65 - 117/79)  RR: 18 (09-06-24 @ 09:10) (17 - 18)  SpO2: 96% (09-06-24 @ 09:10) (93% - 98%)      09-05-24 @ 07:01  -  09-06-24 @ 07:00  --------------------------------------------------------  IN: 1000 mL / OUT: 0 mL / NET: 1000 mL        chlorhexidine 2% Cloths 1 Application(s) Topical daily  diazepam    Tablet 2 milliGRAM(s) Oral two times a day PRN  FLUoxetine 40 milliGRAM(s) Oral at bedtime  hydrocortisone hemorrhoidal Suppository 1 Suppository(s) Rectal at bedtime  HYDROmorphone PCA (1 mG/mL) 30 milliLiter(s) PCA Continuous PCA Continuous  lidocaine   4% Patch 1 Patch Transdermal daily  memantine 10 milliGRAM(s) Oral two times a day  methadone    Tablet 15 milliGRAM(s) Oral at bedtime  methadone    Tablet 10 milliGRAM(s) Oral <User Schedule>  naloxone Injectable 0.1 milliGRAM(s) IV Push every 3 minutes PRN  OLANZapine 2.5 milliGRAM(s) Oral at bedtime  ondansetron Injectable 4 milliGRAM(s) IV Push every 8 hours PRN  pantoprazole  Injectable 40 milliGRAM(s) IV Push every 12 hours  pregabalin 200 milliGRAM(s) Oral three times a day                          8.8    2.47  )-----------( 143      ( 06 Sep 2024 05:04 )             28.1     09-06    140  |  107  |  12.3  ----------------------------<  118<H>  4.0   |  25.0  |  0.44<L>    Ca    8.1<L>      06 Sep 2024 05:04  Phos  4.1     09-05  Mg     2.0     09-05        Urinalysis Basic - ( 06 Sep 2024 05:04 )    Color: x / Appearance: x / SG: x / pH: x  Gluc: 118 mg/dL / Ketone: x  / Bili: x / Urobili: x   Blood: x / Protein: x / Nitrite: x   Leuk Esterase: x / RBC: x / WBC x   Sq Epi: x / Non Sq Epi: x / Bacteria: x        Pain Service   [phone]

## 2024-09-06 NOTE — DISCHARGE NOTE PROVIDER - NSDCCPCAREPLAN_GEN_ALL_CORE_FT
PRINCIPAL DISCHARGE DIAGNOSIS  Diagnosis: Rectal bleeding  Assessment and Plan of Treatment: You presented with bleeding with bowel movements. There is a history of Internal Hemorrhoids which were treated with an Anusol suppository. You received 3 units of blood, and a flexible sigmoidoscopy was done which showed __________. [facility]      SECONDARY DISCHARGE DIAGNOSES  Diagnosis: Breast cancer, stage 4  Assessment and Plan of Treatment: You presented with a history of stage 4 breast cancer, for which you were evaluated by pain management specialists who continued your home pain medications. Please continue to follow up with your Hematologist/Oncologist.    Diagnosis: Anxiety  Assessment and Plan of Treatment: You presented with a history of anxiety. Your home medications were continued while you were admitted. No changes were made, please continue to follow up with your psychiatrist outpatient.     PRINCIPAL DISCHARGE DIAGNOSIS  Diagnosis: Rectal bleeding  Assessment and Plan of Treatment: You presented with bleeding with bowel movements. There is a history of Internal Hemorrhoids which were treated with an Anusol suppository. You received 3 units of blood, and a flexible sigmoidoscopy was done which showed  mild antral gastritis . [facility]      SECONDARY DISCHARGE DIAGNOSES  Diagnosis: Breast cancer, stage 4  Assessment and Plan of Treatment: You presented with a history of stage 4 breast cancer, for which you were evaluated by pain management specialists who continued your home pain medications. Please continue to follow up with your Hematologist/Oncologist.    Diagnosis: Anxiety  Assessment and Plan of Treatment: You presented with a history of anxiety. Your home medications were continued while you were admitted. No changes were made, please continue to follow up with your psychiatrist outpatient.

## 2024-09-06 NOTE — PROGRESS NOTE ADULT - ASSESSMENT
39 y/o female with hx of Stage-4 Breast cancer with diffuse osseous mets, on home methadone for chronic pain    dPCA 0/0.3/8/6    cont home methadone 10/10//15        when due for discharge:  DC PCA  - Recommend starting dilaudid PO 4mg/8mg h1glhou PRN mod/severe pain

## 2024-09-06 NOTE — DISCHARGE NOTE PROVIDER - NSDCMRMEDTOKEN_GEN_ALL_CORE_FT
diazePAM 2 mg oral tablet: 1 tab(s) orally 2 times a day as needed for  muscle spasm, anxiety  Dilaudid 4 mg oral tablet: 1 tab(s) orally 3 times a day as needed for  moderate pain  Dilaudid 8 mg oral tablet: 1 tab(s) orally 4 times a day as needed for  severe pain  FLUoxetine 40 mg oral capsule: 2 cap(s) orally once a day (at bedtime)  furosemide 20 mg oral tablet: 1 tab(s) orally once a day  methadone 10 mg oral tablet: 1 tab(s) orally 3 times a day  methadone 5 mg oral tablet: 3 tab(s) orally once a day (at bedtime)  Namenda 10 mg oral tablet: 1 tab(s) orally  octreotide 100 mcg/mL injectable solution: 100 microgram(s) 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  OLANZapine 2.5 mg oral tablet: 1 tab(s) orally once a day (at bedtime)  pregabalin 200 mg oral capsule: 1 cap(s) orally 3 times a day   diazePAM 2 mg oral tablet: 1 tab(s) orally 2 times a day as needed for  muscle spasm, anxiety  Dilaudid 4 mg oral tablet: 1 tab(s) orally 3 times a day as needed for  moderate pain  Dilaudid 8 mg oral tablet: 1 tab(s) orally 4 times a day as needed for  severe pain  FLUoxetine 40 mg oral capsule: 2 cap(s) orally once a day (at bedtime)  furosemide 20 mg oral tablet: 1 tab(s) orally once a day  hydrocortisone 25 mg rectal suppository: 1 suppository(ies) rectal once a day (at bedtime)  methadone 10 mg oral tablet: 1 tab(s) orally 3 times a day  methadone 5 mg oral tablet: 3 tab(s) orally once a day (at bedtime)  Namenda 10 mg oral tablet: 1 tab(s) orally  octreotide 100 mcg/mL injectable solution: 100 microgram(s) 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  OLANZapine 2.5 mg oral tablet: 1 tab(s) orally once a day (at bedtime)  pregabalin 200 mg oral capsule: 1 cap(s) orally 3 times a day  Protonix 40 mg oral delayed release tablet: 1 tab(s) orally 2 times a day   diazePAM 2 mg oral tablet: 1 tab(s) orally 2 times a day as needed for  muscle spasm, anxiety  Dilaudid 4 mg oral tablet: 1 tab(s) orally 3 times a day as needed for  moderate pain  Dilaudid 8 mg oral tablet: 1 tab(s) orally 4 times a day as needed for  severe pain MDD: 6  FLUoxetine 40 mg oral capsule: 2 cap(s) orally once a day (at bedtime)  furosemide 20 mg oral tablet: 1 tab(s) orally once a day  methadone 10 mg oral tablet: 1 tab(s) orally 3 times a day  methadone 5 mg oral tablet: 3 tab(s) orally once a day (at bedtime)  Namenda 10 mg oral tablet: 1 tab(s) orally  octreotide 100 mcg/mL injectable solution: 100 microgram(s) 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  OLANZapine 2.5 mg oral tablet: 1 tab(s) orally once a day (at bedtime)  pregabalin 200 mg oral capsule: 1 cap(s) orally 3 times a day  Protonix 40 mg oral delayed release tablet: 1 tab(s) orally 2 times a day

## 2024-09-06 NOTE — PATIENT PROFILE ADULT - FUNCTIONAL ASSESSMENT - BASIC MOBILITY 2.
Monitor: The patient's diabetes is controlled.     A1C:    Hemoglobin A1C (%)   Date Value   08/29/2023 5.4   08/01/2022 6.2 (H)   02/24/2022 6.5 (H)   08/17/2021 6.4 (H)   04/20/2021 6.8 (H)        Evaluation: Reviewed recent labs/tests with the patient.  Assessment/Treatment:  A1c Goal: metN  LDL Goal: met  BP Goal: met  Continue   metformin 1000 mg BID with meals. Refilled today  Trulicity 1.5 mg PO subcutaneous weekly.Refilled today  Discussed foot care.  Discussed risk of non-compliance with patient.  Condition will be reassessed at the next regular appointment.   Patient expresses understanding of the plan; all questions were answered.     4 = No assist / stand by assistance

## 2024-09-06 NOTE — PATIENT PROFILE ADULT - NSPROGENBLOODRESTRICT_GEN_A_NUR
Patient placed on procedure table in supine position with arms at side. Positioning devices: all pressure points padded, arm board under arms, heel pads in place, safety strap applied, wrist straps in place, donut foam under head and pillow under knees.  none

## 2024-09-07 LAB
HCT VFR BLD CALC: 29.8 % — LOW (ref 34.5–45)
HGB BLD-MCNC: 9.1 G/DL — LOW (ref 11.5–15.5)
MCHC RBC-ENTMCNC: 29.7 PG — SIGNIFICANT CHANGE UP (ref 27–34)
MCHC RBC-ENTMCNC: 30.5 GM/DL — LOW (ref 32–36)
MCV RBC AUTO: 97.4 FL — SIGNIFICANT CHANGE UP (ref 80–100)
PLATELET # BLD AUTO: 145 K/UL — LOW (ref 150–400)
RBC # BLD: 3.06 M/UL — LOW (ref 3.8–5.2)
RBC # FLD: 18.6 % — HIGH (ref 10.3–14.5)
WBC # BLD: 2.22 K/UL — LOW (ref 3.8–10.5)
WBC # FLD AUTO: 2.22 K/UL — LOW (ref 3.8–10.5)

## 2024-09-07 PROCEDURE — 99233 SBSQ HOSP IP/OBS HIGH 50: CPT

## 2024-09-07 RX ORDER — HYDROCORTISONE 1 %
1 OINTMENT (GRAM) TOPICAL
Qty: 30 | Refills: 0
Start: 2024-09-07 | End: 2024-10-06

## 2024-09-07 RX ORDER — HYDROMORPHONE HYDROCHLORIDE 2 MG/1
4 TABLET ORAL EVERY 4 HOURS
Refills: 0 | Status: DISCONTINUED | OUTPATIENT
Start: 2024-09-07 | End: 2024-09-08

## 2024-09-07 RX ORDER — PANTOPRAZOLE SODIUM 40 MG
1 TABLET, DELAYED RELEASE (ENTERIC COATED) ORAL
Qty: 60 | Refills: 0
Start: 2024-09-07 | End: 2024-10-06

## 2024-09-07 RX ADMIN — Medication 40 MILLIGRAM(S): at 06:50

## 2024-09-07 RX ADMIN — PREGABALIN 200 MILLIGRAM(S): 75 CAPSULE ORAL at 14:12

## 2024-09-07 RX ADMIN — Medication 1 PATCH: at 19:40

## 2024-09-07 RX ADMIN — Medication 2 MILLIGRAM(S): at 14:12

## 2024-09-07 RX ADMIN — HYDROMORPHONE HYDROCHLORIDE 4 MILLIGRAM(S): 2 TABLET ORAL at 18:31

## 2024-09-07 RX ADMIN — Medication 1 PATCH: at 23:01

## 2024-09-07 RX ADMIN — CHLORHEXIDINE GLUCONATE 1 APPLICATION(S): 40 SOLUTION TOPICAL at 11:39

## 2024-09-07 RX ADMIN — MEMANTINE 10 MILLIGRAM(S): 7 CAPSULE, EXTENDED RELEASE ORAL at 17:31

## 2024-09-07 RX ADMIN — Medication 40 MILLIGRAM(S): at 17:31

## 2024-09-07 RX ADMIN — HYDROMORPHONE HYDROCHLORIDE 30 MILLILITER(S): 2 TABLET ORAL at 07:31

## 2024-09-07 RX ADMIN — HYDROMORPHONE HYDROCHLORIDE 4 MILLIGRAM(S): 2 TABLET ORAL at 12:49

## 2024-09-07 RX ADMIN — Medication 40 MILLIGRAM(S): at 21:23

## 2024-09-07 RX ADMIN — HYDROMORPHONE HYDROCHLORIDE 4 MILLIGRAM(S): 2 TABLET ORAL at 17:31

## 2024-09-07 RX ADMIN — Medication 1 PATCH: at 11:35

## 2024-09-07 RX ADMIN — PREGABALIN 200 MILLIGRAM(S): 75 CAPSULE ORAL at 06:50

## 2024-09-07 RX ADMIN — MEMANTINE 10 MILLIGRAM(S): 7 CAPSULE, EXTENDED RELEASE ORAL at 06:50

## 2024-09-07 RX ADMIN — METHADONE HYDROCHLORIDE 10 MILLIGRAM(S): 1 POWDER ORAL at 08:24

## 2024-09-07 RX ADMIN — HYDROMORPHONE HYDROCHLORIDE 4 MILLIGRAM(S): 2 TABLET ORAL at 11:49

## 2024-09-07 RX ADMIN — PREGABALIN 200 MILLIGRAM(S): 75 CAPSULE ORAL at 21:23

## 2024-09-07 RX ADMIN — HYDROMORPHONE HYDROCHLORIDE 30 MILLILITER(S): 2 TABLET ORAL at 19:36

## 2024-09-07 RX ADMIN — METHADONE HYDROCHLORIDE 10 MILLIGRAM(S): 1 POWDER ORAL at 14:12

## 2024-09-07 RX ADMIN — METHADONE HYDROCHLORIDE 15 MILLIGRAM(S): 1 POWDER ORAL at 21:23

## 2024-09-07 RX ADMIN — Medication 1 SUPPOSITORY(S): at 23:04

## 2024-09-07 RX ADMIN — OLANZAPINE 2.5 MILLIGRAM(S): 7.5 TABLET ORAL at 23:03

## 2024-09-07 RX ADMIN — HYDROMORPHONE HYDROCHLORIDE 30 MILLILITER(S): 2 TABLET ORAL at 23:41

## 2024-09-08 VITALS
TEMPERATURE: 98 F | DIASTOLIC BLOOD PRESSURE: 63 MMHG | RESPIRATION RATE: 17 BRPM | HEART RATE: 72 BPM | SYSTOLIC BLOOD PRESSURE: 104 MMHG | OXYGEN SATURATION: 98 %

## 2024-09-08 PROCEDURE — 99232 SBSQ HOSP IP/OBS MODERATE 35: CPT | Mod: GC

## 2024-09-08 PROCEDURE — 76705 ECHO EXAM OF ABDOMEN: CPT | Mod: 26

## 2024-09-08 RX ORDER — HYDROMORPHONE HYDROCHLORIDE 2 MG/1
1 TABLET ORAL
Qty: 12 | Refills: 0
Start: 2024-09-08 | End: 2024-09-10

## 2024-09-08 RX ORDER — HYDROMORPHONE HYDROCHLORIDE 2 MG/1
8 TABLET ORAL EVERY 4 HOURS
Refills: 0 | Status: DISCONTINUED | OUTPATIENT
Start: 2024-09-08 | End: 2024-09-08

## 2024-09-08 RX ORDER — LIDOCAINE/BENZALKONIUM/ALCOHOL
1 SOLUTION, NON-ORAL TOPICAL ONCE
Refills: 0 | Status: COMPLETED | OUTPATIENT
Start: 2024-09-08 | End: 2024-09-08

## 2024-09-08 RX ORDER — HYDROMORPHONE HYDROCHLORIDE 2 MG/1
4 TABLET ORAL ONCE
Refills: 0 | Status: DISCONTINUED | OUTPATIENT
Start: 2024-09-08 | End: 2024-09-08

## 2024-09-08 RX ORDER — PANTOPRAZOLE SODIUM 40 MG
1 TABLET, DELAYED RELEASE (ENTERIC COATED) ORAL
Qty: 60 | Refills: 0
Start: 2024-09-08 | End: 2024-10-07

## 2024-09-08 RX ORDER — HYDROMORPHONE HYDROCHLORIDE 2 MG/1
1 TABLET ORAL
Refills: 0 | DISCHARGE

## 2024-09-08 RX ORDER — HEPARIN SODIUM,BOVINE 1000/ML
300 VIAL (ML) INJECTION ONCE
Refills: 0 | Status: COMPLETED | OUTPATIENT
Start: 2024-09-08 | End: 2024-09-08

## 2024-09-08 RX ADMIN — HYDROMORPHONE HYDROCHLORIDE 30 MILLILITER(S): 2 TABLET ORAL at 07:40

## 2024-09-08 RX ADMIN — Medication 1 PATCH: at 14:35

## 2024-09-08 RX ADMIN — METHADONE HYDROCHLORIDE 10 MILLIGRAM(S): 1 POWDER ORAL at 08:49

## 2024-09-08 RX ADMIN — CHLORHEXIDINE GLUCONATE 1 APPLICATION(S): 40 SOLUTION TOPICAL at 11:29

## 2024-09-08 RX ADMIN — PREGABALIN 200 MILLIGRAM(S): 75 CAPSULE ORAL at 13:43

## 2024-09-08 RX ADMIN — MEMANTINE 10 MILLIGRAM(S): 7 CAPSULE, EXTENDED RELEASE ORAL at 05:47

## 2024-09-08 RX ADMIN — HYDROMORPHONE HYDROCHLORIDE 8 MILLIGRAM(S): 2 TABLET ORAL at 13:37

## 2024-09-08 RX ADMIN — Medication 40 MILLIGRAM(S): at 17:44

## 2024-09-08 RX ADMIN — HYDROMORPHONE HYDROCHLORIDE 8 MILLIGRAM(S): 2 TABLET ORAL at 12:37

## 2024-09-08 RX ADMIN — MEMANTINE 10 MILLIGRAM(S): 7 CAPSULE, EXTENDED RELEASE ORAL at 17:45

## 2024-09-08 RX ADMIN — Medication 40 MILLIGRAM(S): at 05:47

## 2024-09-08 RX ADMIN — METHADONE HYDROCHLORIDE 10 MILLIGRAM(S): 1 POWDER ORAL at 13:43

## 2024-09-08 RX ADMIN — Medication 300 UNIT(S): at 18:43

## 2024-09-08 RX ADMIN — PREGABALIN 200 MILLIGRAM(S): 75 CAPSULE ORAL at 05:47

## 2024-09-08 RX ADMIN — HYDROMORPHONE HYDROCHLORIDE 4 MILLIGRAM(S): 2 TABLET ORAL at 18:30

## 2024-09-08 RX ADMIN — Medication 1 PATCH: at 11:24

## 2024-09-08 NOTE — PROGRESS NOTE ADULT - ASSESSMENT
37 y/o F with PMH of stage IV breast cancer s/p treatment, GARETT given gastric antral vascular ectasia admitted for acute on chronic anemia.     Plan:    #Acute on chronic blood loss Anemia  - with hx of GAVE recent admission s/p EGD with cauterization (7/31).   - H/H 5.9/19.7, FOBT positive   - received 2u PRBC 09/03  - post transfusion H/H is 8.6/27.5 (9/4) --> 9.1/29.8 9/7  - received 80mg IV protonix in the ED  - Continue Protonix 40mg QD PO per GI  - GI recs appreciated   - Flexible sigmoidoscopy/EGD 9/6 - mild antral gastritis + internal and external hemorrhoids, no active bleeding  - c/w Anusol suppository nightly   - Received 1 unit PRBC 9/5    #Metastatic Breast Cancer   - off Enhertu (last dose 6/19)  - Hem/ Onc following  - Outpatient follow up with Rad / Onc  - Pain control with Dilaudid po and IV, pt reports that PO Dilaudid ineffective --> PCA pain pump 9/3  - Valium PRN for muscle spasm and anxiety  - Pain management consult appreciated  - Continue Methadone    #Anxiety/Mood Disorder  - Continue Fluoxetine, Olanzapine and Diazepam (PRN)    DVT PPx  -SCDs     Dispo: Medically cleared for discharge   37 y/o F with PMH of stage IV breast cancer s/p treatment, GARETT given gastric antral vascular ectasia admitted for acute on chronic anemia.     Plan:    #Acute on chronic blood loss Anemia  - with hx of GAVE recent admission s/p EGD with cauterization (7/31).   - H/H 5.9/19.7, FOBT positive   - received 2u PRBC 09/03  - post transfusion H/H is 8.6/27.5 (9/4) --> 9.1/29.8 9/7  - received 80mg IV protonix in the ED  - Continue Protonix 40mg QD PO per GI  - GI recs appreciated   - Flexible sigmoidoscopy/EGD 9/6 - mild antral gastritis + internal and external hemorrhoids, no active bleeding  - c/w Anusol suppository nightly   - Received 1 unit PRBC 9/5    #Metastatic Breast Cancer   - off Enhertu (last dose 6/19)  - Hem/ Onc following  - Outpatient follow up with Rad / Onc  - Pain control with Dilaudid po and IV, pt reports that PO Dilaudid ineffective --> PCA pain pump 9/3  - Valium PRN for muscle spasm and anxiety  - Pain management consult appreciated  - Continue Methadone    #RUQ pain   -Abd US pending 9/8    #Anxiety/Mood Disorder  - Continue Fluoxetine, Olanzapine and Diazepam (PRN)    DVT PPx  -SCDs     Dispo: Medically cleared for discharge   39 y/o F with PMH of stage IV breast cancer s/p treatment, GARETT given gastric antral vascular ectasia admitted for acute on chronic anemia.     Plan:    #Acute on chronic blood loss Anemia  - with hx of GAVE recent admission s/p EGD with cauterization (7/31).   -  on admission H/H 5.9/19.7, FOBT positive   - received 2u PRBC 9/3 and 1u 9/5  - post transfusion H/H is 8.6/27.5 (9/4) --> 9.1/29.8 9/7  - received 80mg IV protonix in the ED  - Continue Protonix 40mg QD PO per GI  - GI recs appreciated   - Flexible sigmoidoscopy/EGD 9/6 - mild antral gastritis + internal and external hemorrhoids, no active bleeding  - c/w Anusol 40mg suppository nightly   - trending H/H       #Metastatic Breast Cancer   - off Enhertu (last dose 6/19)  - Hem/ Onc following  - Outpatient follow up with Rad / Onc  - Pain control with Dilaudid po and IV, pt reports that PO Dilaudid ineffective --> PCA pain pump 9/3  - Valium PRN for muscle spasm and anxiety  - Pain management consult appreciated  - Continue Methadone    #RUQ pain   - Abd US pending 9/8    #Anxiety/Mood Disorder  - Continue Fluoxetine, Olanzapine and Diazepam (PRN)    DVT PPx  - SCDs     Dispo: Medically cleared for discharge   37 y/o F with PMH of stage IV breast cancer s/p treatment, GARETT given gastric antral vascular ectasia admitted for acute on chronic anemia.     Plan:    #Acute on chronic blood loss Anemia  - with hx of GAVE recent admission s/p EGD with cauterization (7/31).   -  on admission H/H 5.9/19.7, FOBT positive   - received 2u PRBC 9/3 and 1u 9/5  - post transfusion H/H is 8.6/27.5 (9/4) --> 9.1/29.8 9/7  - received 80mg IV protonix in the ED  - Continue Protonix 40mg QD PO per GI  - GI recs appreciated   - Flexible sigmoidoscopy/EGD 9/6 - mild antral gastritis + internal and external hemorrhoids, no active bleeding  - c/w Anusol 40mg suppository nightly   - trending H/H       #Metastatic Breast Cancer   - off Enhertu (last dose 6/19)  - Hem/ Onc following  - Outpatient follow up with Rad / Onc  - Pain control with Dilaudid po and IV, pt reports that PO Dilaudid ineffective --> PCA pain pump 9/3, trial off PCA today  - Valium PRN for muscle spasm and anxiety  - Pain management consult appreciated  - Continue Methadone    #RUQ pain   - Abd US pending 9/8    #Anxiety/Mood Disorder  - Continue Fluoxetine, Olanzapine and Diazepam (PRN)    DVT PPx  - SCDs     Dispo: Medically cleared for discharge

## 2024-09-08 NOTE — PROGRESS NOTE ADULT - REASON FOR ADMISSION
Acute on chronic blood loss Anemia
normal...

## 2024-09-08 NOTE — PROGRESS NOTE ADULT - PROVIDER SPECIALTY LIST ADULT
Gastroenterology
Internal Medicine
Pain Medicine
Pain Medicine
Heme/Onc
Heme/Onc
Hospitalist
Internal Medicine
Internal Medicine
Heme/Onc

## 2024-09-08 NOTE — PROGRESS NOTE ADULT - ATTENDING COMMENTS
39 y/o F with PMH of stage IV breast cancer s/p treatment, GARETT given gastric antral vascular ectasia admitted for acute on chronic anemia.   Hemoglobin on admission was 5.9 and given 2 units PRBC.   GI recs appreciated  Transfused 1 additional unit PRBC on 9/5  H/H downtrending  EGD and flexible sigmoidoscopy today  Pain control per pain management
Agree with above   Patient seen and examined together with medical residents. She was for discharge home yesterday, but did not leave as was put back on PCA pump.   c/o right quadrant abdominal pain. H/h is stable. Had bowel movement. Asking to increase the dose of Hydromorphone to 8 mg, what she takes at home   Vital signs,  labs and imaging  reviewed   Assessment and plan discussed with patient and residents   d/c PCA pump  Increase hydromorphone to 8 mg q4 PRN for pain   Get US abdomin to rule out cholecystitis , pts concern   if pain controlled and US negative, she can be discharged home.   Discussed with pt in detail.
39 y/o F with PMH of stage IV breast cancer s/p treatment, GARETT given gastric antral vascular ectasia admitted for acute on chronic anemia.   Hemoglobin on admission was 5.9 and given 2 units PRBC. H/H now stable  GI recs appreciated  Start CLD and monitor CBC  No plan for EGD at this time

## 2024-09-08 NOTE — DISCHARGE NOTE NURSING/CASE MANAGEMENT/SOCIAL WORK - PATIENT PORTAL LINK FT
You can access the FollowMyHealth Patient Portal offered by [facility] by registering at the following website: [email or web address]. By joining [employer]’s FollowMyHealth portal, you will also be able to view your health information using other applications (apps) compatible with our system.

## 2024-09-23 ENCOUNTER — EMERGENCY (EMERGENCY)
Facility: HOSPITAL | Age: 39
LOS: 1 days | Discharge: HOME | End: 2024-09-23
Attending: STUDENT IN AN ORGANIZED HEALTH CARE EDUCATION/TRAINING PROGRAM
Payer: MEDICARE

## 2024-09-23 VITALS
OXYGEN SATURATION: 100 % | TEMPERATURE: 98 F | RESPIRATION RATE: 16 BRPM | DIASTOLIC BLOOD PRESSURE: 57 MMHG | SYSTOLIC BLOOD PRESSURE: 91 MMHG | HEIGHT: 62 IN | HEART RATE: 81 BPM | WEIGHT: 139.33 LBS

## 2024-09-23 DIAGNOSIS — Z98.890 OTHER SPECIFIED POSTPROCEDURAL STATES: Chronic | ICD-10-CM

## 2024-09-23 DIAGNOSIS — Z90.89 ACQUIRED ABSENCE OF OTHER ORGANS: Chronic | ICD-10-CM

## 2024-09-23 LAB
ALBUMIN SERPL ELPH-MCNC: 2.9 G/DL — LOW (ref 3.3–5.2)
ALP SERPL-CCNC: 205 U/L — HIGH (ref 40–120)
ALT FLD-CCNC: 17 U/L — SIGNIFICANT CHANGE UP
ANION GAP SERPL CALC-SCNC: 9 MMOL/L — SIGNIFICANT CHANGE UP (ref 5–17)
ANISOCYTOSIS BLD QL: SLIGHT — SIGNIFICANT CHANGE UP
AST SERPL-CCNC: 37 U/L — HIGH
BASOPHILS # BLD AUTO: 0 K/UL — SIGNIFICANT CHANGE UP (ref 0–0.2)
BASOPHILS NFR BLD AUTO: 0 % — SIGNIFICANT CHANGE UP (ref 0–2)
BILIRUB SERPL-MCNC: 0.5 MG/DL — SIGNIFICANT CHANGE UP (ref 0.4–2)
BLD GP AB SCN SERPL QL: SIGNIFICANT CHANGE UP
BUN SERPL-MCNC: 10.7 MG/DL — SIGNIFICANT CHANGE UP (ref 8–20)
CALCIUM SERPL-MCNC: 7.7 MG/DL — LOW (ref 8.4–10.5)
CHLORIDE SERPL-SCNC: 111 MMOL/L — HIGH (ref 96–108)
CO2 SERPL-SCNC: 22 MMOL/L — SIGNIFICANT CHANGE UP (ref 22–29)
CREAT SERPL-MCNC: 0.39 MG/DL — LOW (ref 0.5–1.3)
DACRYOCYTES BLD QL SMEAR: SLIGHT — SIGNIFICANT CHANGE UP
EGFR: 131 ML/MIN/1.73M2 — SIGNIFICANT CHANGE UP
EOSINOPHIL # BLD AUTO: 0 K/UL — SIGNIFICANT CHANGE UP (ref 0–0.5)
EOSINOPHIL NFR BLD AUTO: 0 % — SIGNIFICANT CHANGE UP (ref 0–6)
GIANT PLATELETS BLD QL SMEAR: PRESENT — SIGNIFICANT CHANGE UP
GLUCOSE SERPL-MCNC: 75 MG/DL — SIGNIFICANT CHANGE UP (ref 70–99)
HCG SERPL-ACNC: <4 MIU/ML — SIGNIFICANT CHANGE UP
HCT VFR BLD CALC: 22.9 % — LOW (ref 34.5–45)
HGB BLD-MCNC: 6.8 G/DL — CRITICAL LOW (ref 11.5–15.5)
HYPOCHROMIA BLD QL: SIGNIFICANT CHANGE UP
LYMPHOCYTES # BLD AUTO: 0.32 K/UL — LOW (ref 1–3.3)
LYMPHOCYTES # BLD AUTO: 17.5 % — SIGNIFICANT CHANGE UP (ref 13–44)
MACROCYTES BLD QL: SLIGHT — SIGNIFICANT CHANGE UP
MANUAL SMEAR VERIFICATION: SIGNIFICANT CHANGE UP
MCHC RBC-ENTMCNC: 29.3 PG — SIGNIFICANT CHANGE UP (ref 27–34)
MCHC RBC-ENTMCNC: 29.7 GM/DL — LOW (ref 32–36)
MCV RBC AUTO: 98.7 FL — SIGNIFICANT CHANGE UP (ref 80–100)
MONOCYTES # BLD AUTO: 0.06 K/UL — SIGNIFICANT CHANGE UP (ref 0–0.9)
MONOCYTES NFR BLD AUTO: 3.5 % — SIGNIFICANT CHANGE UP (ref 2–14)
NEUTROPHILS # BLD AUTO: 1.45 K/UL — LOW (ref 1.8–7.4)
NEUTROPHILS NFR BLD AUTO: 79 % — HIGH (ref 43–77)
PLAT MORPH BLD: NORMAL — SIGNIFICANT CHANGE UP
PLATELET # BLD AUTO: 127 K/UL — LOW (ref 150–400)
POIKILOCYTOSIS BLD QL AUTO: SLIGHT — SIGNIFICANT CHANGE UP
POLYCHROMASIA BLD QL SMEAR: SIGNIFICANT CHANGE UP
POTASSIUM SERPL-MCNC: 3.7 MMOL/L — SIGNIFICANT CHANGE UP (ref 3.5–5.3)
POTASSIUM SERPL-SCNC: 3.7 MMOL/L — SIGNIFICANT CHANGE UP (ref 3.5–5.3)
PROT SERPL-MCNC: 4.7 G/DL — LOW (ref 6.6–8.7)
RBC # BLD: 2.32 M/UL — LOW (ref 3.8–5.2)
RBC # FLD: 17.6 % — HIGH (ref 10.3–14.5)
RBC BLD AUTO: ABNORMAL
SCHISTOCYTES BLD QL AUTO: SLIGHT — SIGNIFICANT CHANGE UP
SMUDGE CELLS # BLD: PRESENT — SIGNIFICANT CHANGE UP
SODIUM SERPL-SCNC: 142 MMOL/L — SIGNIFICANT CHANGE UP (ref 135–145)
WBC # BLD: 1.84 K/UL — LOW (ref 3.8–10.5)
WBC # FLD AUTO: 1.84 K/UL — LOW (ref 3.8–10.5)

## 2024-09-23 PROCEDURE — 99223 1ST HOSP IP/OBS HIGH 75: CPT

## 2024-09-23 RX ORDER — HYDROMORPHONE HYDROCHLORIDE 2 MG/1
1 TABLET ORAL ONCE
Refills: 0 | Status: DISCONTINUED | OUTPATIENT
Start: 2024-09-23 | End: 2024-09-23

## 2024-09-23 RX ORDER — PREGABALIN 75 MG/1
200 CAPSULE ORAL THREE TIMES A DAY
Refills: 0 | Status: COMPLETED | OUTPATIENT
Start: 2024-09-23 | End: 2024-09-30

## 2024-09-23 RX ORDER — FLUOXETINE HCL 20 MG/5 ML
80 SOLUTION, ORAL ORAL AT BEDTIME
Refills: 0 | Status: DISCONTINUED | OUTPATIENT
Start: 2024-09-23 | End: 2024-10-01

## 2024-09-23 RX ORDER — METHADONE HYDROCHLORIDE 1 G/G
15 POWDER ORAL AT BEDTIME
Refills: 0 | Status: COMPLETED | OUTPATIENT
Start: 2024-09-23 | End: 2024-09-30

## 2024-09-23 RX ORDER — HYDROMORPHONE HYDROCHLORIDE 2 MG/1
1 TABLET ORAL EVERY 4 HOURS
Refills: 0 | Status: DISCONTINUED | OUTPATIENT
Start: 2024-09-23 | End: 2024-09-23

## 2024-09-23 RX ADMIN — HYDROMORPHONE HYDROCHLORIDE 1 MILLIGRAM(S): 2 TABLET ORAL at 17:15

## 2024-09-23 RX ADMIN — METHADONE HYDROCHLORIDE 15 MILLIGRAM(S): 1 POWDER ORAL at 21:50

## 2024-09-23 RX ADMIN — Medication 80 MILLIGRAM(S): at 21:50

## 2024-09-23 RX ADMIN — PREGABALIN 200 MILLIGRAM(S): 75 CAPSULE ORAL at 21:50

## 2024-09-23 RX ADMIN — HYDROMORPHONE HYDROCHLORIDE 1 MILLIGRAM(S): 2 TABLET ORAL at 16:07

## 2024-09-23 RX ADMIN — HYDROMORPHONE HYDROCHLORIDE 1 MILLIGRAM(S): 2 TABLET ORAL at 21:50

## 2024-09-23 NOTE — ED ADULT NURSE NOTE - CHIEF COMPLAINT QUOTE
c/o hemoglobin of 7, sent in by oncologist Dr. Hare for transfusion of 2units of PRBCs. . Denies SOB , lightheadedness, bleeding.

## 2024-09-23 NOTE — ED CDU PROVIDER INITIAL DAY NOTE - ATTENDING APP SHARED VISIT CONTRIBUTION OF CARE
I have personally performed a history and physical examination of the patient and discussed management with the MAGO as well as the patient.  I reviewed the MAGO's note and agree with the documented findings and plan of care.  I have authored and modified critical sections of the Provider Note, including but not limited to HPI, Physical Exam and MDM.    37yo F with PMHx of Stage 4 Breast Cancer s/p Taxotere/Herceptin with brain mets S/P WBRT now off Enhertu (Last Dose on 6/19), Chronic Iron Deficiency Anemia likely due to Gastric Antral Vascular Ectasia (GAVE), Pericardial Effusion, Spinal Compression Fractures presented to ED for "2units of blood" states blood work today showed hgb in low 7's, recent admission with sigmoidoscopy. has had gi w/u and multiple transfusions. no active chemo. states has some fatigue that she gets when rbc lower.  Patient placed in observation for continued blood transfusion.  Declining additional workup given extensive outpatient care.

## 2024-09-23 NOTE — ED CDU PROVIDER INITIAL DAY NOTE - CLINICAL SUMMARY MEDICAL DECISION MAKING FREE TEXT BOX
37yo F with PMHx of Stage 4 Breast Cancer s/p Taxotere/Herceptin with brain mets S/P WBRT now off Enhertu (Last Dose on 6/19), Chronic Iron Deficiency Anemia likely due to Gastric Antral Vascular Ectasia (GAVE), Pericardial Effusion, Spinal Compression Fractures presented to ED for "2units of blood" states blood work today showed hgb in low 7's, recent admission with sigmoidoscopy. has had gi w/u and multiple transfusions. no active chemo. states has some fatigue that she gets when rbc lower. no f/c. no cough, no new abd pain. denies active rectal bleeding/melena. Reports chronic abdominal pain and generalized malaise. Hemoglobin in ED 6.8. pt consented by ED team for blood transfusion. Pt placed in obs for transfusion. plan to discharge after completion of transfusion

## 2024-09-23 NOTE — ED PROVIDER NOTE - OBJECTIVE STATEMENT
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 has had gi w/u and multiple transfusions. no active chemo. states has some fatigue that she gets when rbc lower. no f/c. no cough, no new abd pain. denies active rectal bleeding/melena.

## 2024-09-23 NOTE — ED ADULT NURSE NOTE - OBJECTIVE STATEMENT
Pt is Axox3 here for a blood transfusion, blood work showed her hemoglobin was low. Breathing even and unlabored. Pt is aware of plan of care.

## 2024-09-23 NOTE — ED PROVIDER NOTE - CLINICAL SUMMARY MEDICAL DECISION MAKING FREE TEXT BOX
Sandi: 37yo F with PMHx of Stage 4 Breast Cancer s/p Taxotere/Herceptin with brain mets S/P WBRT now off Enhertu (Last Dose on 6/19), Chronic Iron Deficiency Anemia likely due to Gastric Antral Vascular Ectasia (GAVE), Pericardial Effusion, Spinal Compression Fractures here for "2units of blood" states bloodwork today showed hgb in low 7's, recent admission with sigmoidoscopy. has had gi w/u and multiple transfusions. no active chemo. states has some fatigue that she gets when rbc lower. no f/c. no cough, no new abd pain. denies active rectal bleeding/melena. pt and father both stating only want the 2rpbc and d/c. do not want other w/u as keeps getting admitted and doesn't want that.

## 2024-09-23 NOTE — ED PROVIDER NOTE - PROGRESS NOTE DETAILS
Sandi: hgb 6.8 bp at baseline. hd stable. states here only for transfusions, no other w/u. placed on obs for 2 units prbc.

## 2024-09-23 NOTE — ED CDU PROVIDER INITIAL DAY NOTE - OBJECTIVE STATEMENT
37yo F with PMHx of Stage 4 Breast Cancer s/p Taxotere/Herceptin with brain mets S/P WBRT now off Enhertu (Last Dose on 6/19), Chronic Iron Deficiency Anemia likely due to Gastric Antral Vascular Ectasia (GAVE), Pericardial Effusion, Spinal Compression Fractures presented to ED for "2units of blood" states blood work today showed hgb in low 7's, recent admission with sigmoidoscopy. has had gi w/u and multiple transfusions. no active chemo. states has some fatigue that she gets when rbc lower. no f/c. no cough, no new abd pain. denies active rectal bleeding/melena. Reports chronic abdominal pain and generalized malaise. Hemoglobin in ED 6.8. pt consented by ED team for blood transfusion. Pt placed in obs for transfusion.

## 2024-09-23 NOTE — ED ADULT NURSE NOTE - BIRTH SEX
[facility] [address], [address] [place] [address]    Consent for Anesthesia   1.    Gillian Ordaz agree to be cared for by an anesthesiologist, who is specially trained to monitor me and give me medicine to put me to sleep or keep me comfort vision, nerves, or muscles and in extremely rare instances death. 5. My doctor has explained to me other choices available to me for my care (alternatives).   6. Pregnant Patients (“epidural”):  I understand that the risks of having an epidural (medicine g Female

## 2024-09-23 NOTE — ED PROVIDER NOTE - PHYSICAL EXAMINATION
Gen: cachetic, chronically ill appearing  HEENT: Mucous membranes moist, pale conjunctivae, EOMI  CV: RRR, nl s1/s2.  Resp: CTAB, normal rate and effort  GI: Abdomen soft, NT, ND. No rebound, no guarding  : No CVAT  Neuro: A&O x 3, moving all 4 extremities  MSK: No spine or joint tenderness to palpation  Skin: No rashes. intact and perfused.

## 2024-09-23 NOTE — ED ADULT TRIAGE NOTE - CHIEF COMPLAINT QUOTE
c/o hemoglobin of 7, sent in by oncologist for blood transfusion. Denies SOB , lightheadedness, bleeding. c/o hemoglobin of 7, sent in by oncologist Dr. Hare for transfusion of 2units of PRBCs. . Denies SOB , lightheadedness, bleeding.

## 2024-09-24 VITALS
HEART RATE: 60 BPM | TEMPERATURE: 98 F | SYSTOLIC BLOOD PRESSURE: 105 MMHG | RESPIRATION RATE: 18 BRPM | OXYGEN SATURATION: 100 % | DIASTOLIC BLOOD PRESSURE: 72 MMHG

## 2024-09-24 PROCEDURE — 99284 EMERGENCY DEPT VISIT MOD MDM: CPT | Mod: 25

## 2024-09-24 PROCEDURE — 96374 THER/PROPH/DIAG INJ IV PUSH: CPT

## 2024-09-24 PROCEDURE — G0378: CPT

## 2024-09-24 PROCEDURE — 99239 HOSP IP/OBS DSCHRG MGMT >30: CPT

## 2024-09-24 PROCEDURE — 86850 RBC ANTIBODY SCREEN: CPT

## 2024-09-24 PROCEDURE — 36415 COLL VENOUS BLD VENIPUNCTURE: CPT

## 2024-09-24 PROCEDURE — 86922 COMPATIBILITY TEST ANTIGLOB: CPT

## 2024-09-24 PROCEDURE — 36430 TRANSFUSION BLD/BLD COMPNT: CPT

## 2024-09-24 PROCEDURE — 80053 COMPREHEN METABOLIC PANEL: CPT

## 2024-09-24 PROCEDURE — 86900 BLOOD TYPING SEROLOGIC ABO: CPT

## 2024-09-24 PROCEDURE — 85025 COMPLETE CBC W/AUTO DIFF WBC: CPT

## 2024-09-24 PROCEDURE — 84702 CHORIONIC GONADOTROPIN TEST: CPT

## 2024-09-24 PROCEDURE — 86901 BLOOD TYPING SEROLOGIC RH(D): CPT

## 2024-09-24 PROCEDURE — 96376 TX/PRO/DX INJ SAME DRUG ADON: CPT

## 2024-09-24 PROCEDURE — P9040: CPT

## 2024-09-24 RX ORDER — HEPARIN SODIUM,PORCINE/PF 100/ML (1)
300 VIAL (ML) INTRAVENOUS ONCE
Refills: 0 | Status: COMPLETED | OUTPATIENT
Start: 2024-09-24 | End: 2024-09-24

## 2024-09-24 RX ADMIN — HYDROMORPHONE HYDROCHLORIDE 1 MILLIGRAM(S): 2 TABLET ORAL at 00:04

## 2024-09-24 RX ADMIN — HYDROMORPHONE HYDROCHLORIDE 1 MILLIGRAM(S): 2 TABLET ORAL at 00:09

## 2024-09-24 RX ADMIN — Medication 300 UNIT(S): at 01:48

## 2024-09-24 NOTE — ED ADULT NURSE REASSESSMENT NOTE - NS ED NURSE REASSESS COMMENT FT1
Assumed care for patient from VADIM Arevalo @ [id number]. Patient AXOX4, VSS, RR even and unlabored. 2nd unit of PRBC in progress. Patient understands plan of care. NAD noted.

## 2024-09-24 NOTE — ED CDU PROVIDER DISPOSITION NOTE - CLINICAL COURSE
37 yo female PMHx of Stage 4 Breast Cancer s/p Taxotere/Herceptin with brain mets S/P WBRT now off Enhertu (Last Dose on 6/19), Chronic Iron Deficiency Anemia likely due to Gastric Antral Vascular Ectasia (GAVE), Pericardial Effusion, Spinal Compression Fractures presented to ED for "2units of blood" states blood work today showed hgb in low 7's, recent admission with sigmoidoscopy. has had gi w/u and multiple transfusions. no active chemo. Hemoglobin in ED 6.8. pt consented by ED team for blood transfusion. Pt placed in OBS for transfusion. Completed without issue. VSS. Medically stable for discharge.

## 2024-09-24 NOTE — ED CDU PROVIDER DISPOSITION NOTE - NS ED ATTENDING STATEMENT MOD
I have seen and examined this patient and fully participated in the care of this patient as the teaching attending.  The service was shared with the MAGO.  I reviewed and verified the documentation.

## 2024-09-24 NOTE — ED CDU PROVIDER SUBSEQUENT DAY NOTE - CLINICAL SUMMARY MEDICAL DECISION MAKING FREE TEXT BOX
39 yo female PMHx of Stage 4 Breast Cancer s/p Taxotere/Herceptin with brain mets S/P WBRT now off Enhertu (Last Dose on 6/19), Chronic Iron Deficiency Anemia likely due to Gastric Antral Vascular Ectasia (GAVE), Pericardial Effusion, Spinal Compression Fractures presented to ED for "2units of blood" states blood work today showed hgb in low 7's, recent admission with sigmoidoscopy. has had gi w/u and multiple transfusions. no active chemo. Hemoglobin in ED 6.8. pt consented by ED team for blood transfusion. Pt placed in OBS for transfusion. Completed without issue. VSS. Medically stable for discharge.

## 2024-09-24 NOTE — ED CDU PROVIDER DISPOSITION NOTE - ATTENDING CONTRIBUTION TO CARE
I personally saw the patient with the PA, and completed the key components of the history and physical exam. I then discussed the management plan with the PA.   General No acute distress respiratory clear cardiac no murmur abdomen soft neuro intact   agree with PA plan of care

## 2024-09-24 NOTE — ED CDU PROVIDER SUBSEQUENT DAY NOTE - PHYSICAL EXAMINATION
Gen: cachetic, chronically ill appearing  HEENT: Mucous membranes moist, pale conjunctivae, EOMI  CV: RRR, nl s1/s2.  Resp: CTAB, normal rate and effort  GI: Abdomen soft, NT, ND. No rebound, no guarding  : No CVAT  Neuro: A&O x 3, moving all 4 extremities  MSK: No spine or joint tenderness to palpation  Skin: No rashes. intact and perfused. Gen: cachetic, chronically ill appearing  HEENT: Mucous membranes moist, EOMI  CV: RRR, nl s1/s2.  Resp: CTAB, normal rate and effort  GI: Abdomen soft, NT, ND. No rebound, no guarding  : No CVAT  Neuro: A&O x 3, moving all 4 extremities  MSK: No spine or joint tenderness to palpation  Skin: No rashes. intact and perfused.

## 2024-09-24 NOTE — ED CDU PROVIDER SUBSEQUENT DAY NOTE - ATTENDING APP SHARED VISIT CONTRIBUTION OF CARE
I personally saw the patient with the PA, and completed the key components of the history and physical exam. I then discussed the management plan with the PA.   Gen: cachetic, chronically ill appearing  HEENT: Mucous membranes moist, EOMI  CV: RRR, nl s1/s2.  Resp: CTAB, normal rate and effort  GI: Abdomen soft, NT, ND. No rebound, no guarding  : No CVAT  Neuro: A&O x 3, moving all 4 extremities  MSK: No spine or joint tenderness to palpation  Skin: No rashes. intact and perfused.

## 2024-09-24 NOTE — ED CDU PROVIDER DISPOSITION NOTE - PATIENT PORTAL LINK FT
You can access the FollowMyHealth Patient Portal offered by [facility] by registering at the following website: [email or web address]. By joining [employer]’s FollowMyHealth portal, you will also be able to view your health information using other applications (apps) compatible with our system.

## 2024-09-27 ENCOUNTER — APPOINTMENT (OUTPATIENT)
Dept: GASTROENTEROLOGY | Facility: CLINIC | Age: 39
End: 2024-09-27
Payer: MEDICARE

## 2024-09-27 ENCOUNTER — INPATIENT (INPATIENT)
Facility: HOSPITAL | Age: 39
LOS: 6 days | Discharge: HOME | DRG: 93 | End: 2024-10-04
Attending: STUDENT IN AN ORGANIZED HEALTH CARE EDUCATION/TRAINING PROGRAM | Admitting: HOSPITALIST
Payer: MEDICARE

## 2024-09-27 VITALS
HEART RATE: 83 BPM | HEIGHT: 61 IN | DIASTOLIC BLOOD PRESSURE: 80 MMHG | SYSTOLIC BLOOD PRESSURE: 120 MMHG | WEIGHT: 148 LBS | OXYGEN SATURATION: 97 % | BODY MASS INDEX: 27.94 KG/M2

## 2024-09-27 VITALS
HEIGHT: 62 IN | HEART RATE: 66 BPM | OXYGEN SATURATION: 99 % | DIASTOLIC BLOOD PRESSURE: 69 MMHG | TEMPERATURE: 99 F | RESPIRATION RATE: 20 BRPM | WEIGHT: 156.53 LBS | SYSTOLIC BLOOD PRESSURE: 110 MMHG

## 2024-09-27 DIAGNOSIS — Z90.89 ACQUIRED ABSENCE OF OTHER ORGANS: Chronic | ICD-10-CM

## 2024-09-27 DIAGNOSIS — C50.919 MALIGNANT NEOPLASM OF UNSPECIFIED SITE OF UNSPECIFIED FEMALE BREAST: ICD-10-CM

## 2024-09-27 DIAGNOSIS — D64.9 ANEMIA, UNSPECIFIED: ICD-10-CM

## 2024-09-27 DIAGNOSIS — R29.810 FACIAL WEAKNESS: ICD-10-CM

## 2024-09-27 DIAGNOSIS — Z98.890 OTHER SPECIFIED POSTPROCEDURAL STATES: Chronic | ICD-10-CM

## 2024-09-27 DIAGNOSIS — K55.20 ANGIODYSPLASIA OF COLON W/OUT HEMORRHAGE: ICD-10-CM

## 2024-09-27 LAB
ALBUMIN SERPL ELPH-MCNC: 3.4 G/DL — SIGNIFICANT CHANGE UP (ref 3.3–5.2)
ALP SERPL-CCNC: 251 U/L — HIGH (ref 40–120)
ALT FLD-CCNC: 20 U/L — SIGNIFICANT CHANGE UP
ANION GAP SERPL CALC-SCNC: 8 MMOL/L — SIGNIFICANT CHANGE UP (ref 5–17)
ANISOCYTOSIS BLD QL: SLIGHT — SIGNIFICANT CHANGE UP
APPEARANCE UR: CLEAR — SIGNIFICANT CHANGE UP
APTT BLD: 39.7 SEC — HIGH (ref 24.5–35.6)
AST SERPL-CCNC: 41 U/L — HIGH
BACTERIA # UR AUTO: NEGATIVE /HPF — SIGNIFICANT CHANGE UP
BASOPHILS # BLD AUTO: 0 K/UL — SIGNIFICANT CHANGE UP (ref 0–0.2)
BASOPHILS NFR BLD AUTO: 0 % — SIGNIFICANT CHANGE UP (ref 0–2)
BILIRUB SERPL-MCNC: 1 MG/DL — SIGNIFICANT CHANGE UP (ref 0.4–2)
BILIRUB UR-MCNC: NEGATIVE — SIGNIFICANT CHANGE UP
BUN SERPL-MCNC: 7.2 MG/DL — LOW (ref 8–20)
CALCIUM SERPL-MCNC: 8.3 MG/DL — LOW (ref 8.4–10.5)
CAST: 0 /LPF — SIGNIFICANT CHANGE UP (ref 0–4)
CHLORIDE SERPL-SCNC: 103 MMOL/L — SIGNIFICANT CHANGE UP (ref 96–108)
CO2 SERPL-SCNC: 27 MMOL/L — SIGNIFICANT CHANGE UP (ref 22–29)
COLOR SPEC: YELLOW — SIGNIFICANT CHANGE UP
CREAT SERPL-MCNC: 0.52 MG/DL — SIGNIFICANT CHANGE UP (ref 0.5–1.3)
DIFF PNL FLD: NEGATIVE — SIGNIFICANT CHANGE UP
EGFR: 123 ML/MIN/1.73M2 — SIGNIFICANT CHANGE UP
EOSINOPHIL # BLD AUTO: 0.1 K/UL — SIGNIFICANT CHANGE UP (ref 0–0.5)
EOSINOPHIL NFR BLD AUTO: 2.6 % — SIGNIFICANT CHANGE UP (ref 0–6)
GIANT PLATELETS BLD QL SMEAR: PRESENT — SIGNIFICANT CHANGE UP
GLUCOSE SERPL-MCNC: 62 MG/DL — LOW (ref 70–99)
GLUCOSE UR QL: NEGATIVE MG/DL — SIGNIFICANT CHANGE UP
HCG SERPL-ACNC: <4 MIU/ML — SIGNIFICANT CHANGE UP
HCT VFR BLD CALC: 33 % — LOW (ref 34.5–45)
HGB BLD-MCNC: 10.2 G/DL — LOW (ref 11.5–15.5)
INR BLD: 1.19 RATIO — HIGH (ref 0.85–1.16)
KETONES UR-MCNC: NEGATIVE MG/DL — SIGNIFICANT CHANGE UP
LEUKOCYTE ESTERASE UR-ACNC: NEGATIVE — SIGNIFICANT CHANGE UP
LYMPHOCYTES # BLD AUTO: 0.6 K/UL — LOW (ref 1–3.3)
LYMPHOCYTES # BLD AUTO: 15 % — SIGNIFICANT CHANGE UP (ref 13–44)
MACROCYTES BLD QL: SLIGHT — SIGNIFICANT CHANGE UP
MANUAL SMEAR VERIFICATION: SIGNIFICANT CHANGE UP
MCHC RBC-ENTMCNC: 29.7 PG — SIGNIFICANT CHANGE UP (ref 27–34)
MCHC RBC-ENTMCNC: 30.9 GM/DL — LOW (ref 32–36)
MCV RBC AUTO: 95.9 FL — SIGNIFICANT CHANGE UP (ref 80–100)
MONOCYTES # BLD AUTO: 0.57 K/UL — SIGNIFICANT CHANGE UP (ref 0–0.9)
MONOCYTES NFR BLD AUTO: 14.2 % — HIGH (ref 2–14)
NEUTROPHILS # BLD AUTO: 2.68 K/UL — SIGNIFICANT CHANGE UP (ref 1.8–7.4)
NEUTROPHILS NFR BLD AUTO: 67.3 % — SIGNIFICANT CHANGE UP (ref 43–77)
NITRITE UR-MCNC: NEGATIVE — SIGNIFICANT CHANGE UP
OVALOCYTES BLD QL SMEAR: SLIGHT — SIGNIFICANT CHANGE UP
PH UR: 7.5 — SIGNIFICANT CHANGE UP (ref 5–8)
PLAT MORPH BLD: NORMAL — SIGNIFICANT CHANGE UP
PLATELET # BLD AUTO: 152 K/UL — SIGNIFICANT CHANGE UP (ref 150–400)
POIKILOCYTOSIS BLD QL AUTO: SLIGHT — SIGNIFICANT CHANGE UP
POLYCHROMASIA BLD QL SMEAR: SLIGHT — SIGNIFICANT CHANGE UP
POTASSIUM SERPL-MCNC: 3.4 MMOL/L — LOW (ref 3.5–5.3)
POTASSIUM SERPL-SCNC: 3.4 MMOL/L — LOW (ref 3.5–5.3)
PROT SERPL-MCNC: 5.7 G/DL — LOW (ref 6.6–8.7)
PROT UR-MCNC: NEGATIVE MG/DL — SIGNIFICANT CHANGE UP
PROTHROM AB SERPL-ACNC: 13.8 SEC — HIGH (ref 9.9–13.4)
RBC # BLD: 3.44 M/UL — LOW (ref 3.8–5.2)
RBC # FLD: 17.5 % — HIGH (ref 10.3–14.5)
RBC BLD AUTO: ABNORMAL
RBC CASTS # UR COMP ASSIST: 0 /HPF — SIGNIFICANT CHANGE UP (ref 0–4)
SODIUM SERPL-SCNC: 138 MMOL/L — SIGNIFICANT CHANGE UP (ref 135–145)
SP GR SPEC: >1.03 — HIGH (ref 1–1.03)
SQUAMOUS # UR AUTO: 2 /HPF — SIGNIFICANT CHANGE UP (ref 0–5)
TROPONIN T, HIGH SENSITIVITY RESULT: 10 NG/L — SIGNIFICANT CHANGE UP (ref 0–51)
UROBILINOGEN FLD QL: 1 MG/DL — SIGNIFICANT CHANGE UP (ref 0.2–1)
VARIANT LYMPHS # BLD: 0.9 % — SIGNIFICANT CHANGE UP (ref 0–6)
WBC # BLD: 3.98 K/UL — SIGNIFICANT CHANGE UP (ref 3.8–10.5)
WBC # FLD AUTO: 3.98 K/UL — SIGNIFICANT CHANGE UP (ref 3.8–10.5)
WBC UR QL: 1 /HPF — SIGNIFICANT CHANGE UP (ref 0–5)

## 2024-09-27 PROCEDURE — 99285 EMERGENCY DEPT VISIT HI MDM: CPT

## 2024-09-27 PROCEDURE — 0042T: CPT | Mod: MC

## 2024-09-27 PROCEDURE — 70496 CT ANGIOGRAPHY HEAD: CPT | Mod: 26,MC

## 2024-09-27 PROCEDURE — 99214 OFFICE O/P EST MOD 30 MIN: CPT

## 2024-09-27 PROCEDURE — 70450 CT HEAD/BRAIN W/O DYE: CPT | Mod: 26,MC,XU

## 2024-09-27 PROCEDURE — G2211 COMPLEX E/M VISIT ADD ON: CPT

## 2024-09-27 PROCEDURE — 70498 CT ANGIOGRAPHY NECK: CPT | Mod: 26,MC

## 2024-09-27 RX ORDER — HYDROMORPHONE HYDROCHLORIDE 1 MG/ML
2 INJECTION, SOLUTION INTRAMUSCULAR; INTRAVENOUS; SUBCUTANEOUS ONCE
Refills: 0 | Status: DISCONTINUED | OUTPATIENT
Start: 2024-09-27 | End: 2024-09-27

## 2024-09-27 RX ORDER — ONDANSETRON HCL/PF 4 MG/2 ML
8 VIAL (ML) INJECTION ONCE
Refills: 0 | Status: COMPLETED | OUTPATIENT
Start: 2024-09-27 | End: 2024-09-27

## 2024-09-27 RX ADMIN — Medication 8 MILLIGRAM(S): at 22:46

## 2024-09-27 RX ADMIN — HYDROMORPHONE HYDROCHLORIDE 2 MILLIGRAM(S): 1 INJECTION, SOLUTION INTRAMUSCULAR; INTRAVENOUS; SUBCUTANEOUS at 23:16

## 2024-09-27 RX ADMIN — HYDROMORPHONE HYDROCHLORIDE 2 MILLIGRAM(S): 1 INJECTION, SOLUTION INTRAMUSCULAR; INTRAVENOUS; SUBCUTANEOUS at 22:46

## 2024-09-27 NOTE — ED PROVIDER NOTE - PHYSICAL EXAMINATION
General: Awake, alert, lying in bed in NAD  HEENT: Normocephalic, atraumatic. No scleral icterus or conjunctival injection. EOMI. Moist mucous membranes. Oropharynx clear.   Neck:. Soft and supple.  Cardiac: RRR, Peripheral pulses 2+ and symmetric. No LE edema.  Resp: Lungs CTAB. No accessory muscle use  Abd: Soft, non-tender, non-distended. No guarding, rebound, or rigidity.  Back: Spine midline and non-tender.   Skin: No rashes, abrasions, or lacerations.  Neuro: AO x 4. Mild left sided facial droop, decreased sensation in left V2/V3 distribution, otherwise CN II-XII intact. 5/5 strength in all extremities. No dysmetria on finger to nose, nml gait  Psych: Appropriate mood and affect

## 2024-09-27 NOTE — ED ADULT TRIAGE NOTE - TEMP AT ED ARRIVAL (C)
time spent in review of laboratory data, radiology images and results, discussion with primary team/patient, and monitoring for potential decompensation. Interventions performed as documented above. In addition, time also spent to risks and benefits of the procedure, alternative procedures, diagnostic and therapeutic outcomes as well as further management plan. Complex patient requiring services. Interventional Pulmonology services provided to the patient were separate from general pulmonary service due to complexity of issues and interventions required. Time spent separate from time spent doing any procedures. 38 no chest pain and no edema.

## 2024-09-27 NOTE — ED ADULT NURSE NOTE - NSFALLHARMRISKINTERV_ED_ALL_ED

## 2024-09-27 NOTE — ED PROVIDER NOTE - OBJECTIVE STATEMENT
38F hx metastatic BRCA w/ mets to brain, currently off treatment due to anemia, gastric antral vascular ectasia (GAVE) presents with left sided facial numbness and droop since about 6pm tonight. Notes recent ED visits for anemia requiring transfusions. Code stroke activated

## 2024-09-27 NOTE — ED ADULT NURSE NOTE - OBJECTIVE STATEMENT
Pt presents to ED c/o L face numbness and L facial droop that started around 6pm tonight. Code stroke activated in triage. MD Pizano and Casie present. HX metastatic breast cancer, not currently being treated due to anemia. Pt is a&ox4, rr even and unlabored on room air, NAD noted at this time. Pt safety maintained.

## 2024-09-27 NOTE — ASSESSMENT
[FreeTextEntry1] : I had an extensive discussion with the patient and her father.  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  Octreotide has been ineffective.  However, there is a concern that she continues to have recurrent bleeding from the small intestine.  I have recommended to consider thalidomide however it will be up to the discretion of the oncology team and they are concerned his cytopenia has in the presence of the chemotherapy.  She has advanced breast metastatic cancer.  Emotional support was provided.  Extensive counseling was performed.

## 2024-09-27 NOTE — ED PROVIDER NOTE - CLINICAL SUMMARY MEDICAL DECISION MAKING FREE TEXT BOX
38F hx metastatic BRCA, known metastatic lesions to brain presents with left sided facial numbness and droop since 6pm tonight. NIHSS 2 - sensory and mild droop. Delay in CT imaging due to CT scan w/ prior patient currently in. Pt not TNK candidate due to known metastatic brain lesions and mild nondisabling sx. Q1h neurochecks pending workup, likely will need MRI

## 2024-09-27 NOTE — ED PROVIDER NOTE - ATTENDING CONTRIBUTION TO CARE
38F hx of metastatic breast CA known brain metastases presenting with left sided facial numbness and droop, spares the forehead; workup as code stroke though with concern for worsening metastatic process , tba

## 2024-09-27 NOTE — ED PROVIDER NOTE - PROGRESS NOTE DETAILS
Discussed unrevealing results w/ pt. She remains with left facial numbness and mild facial droop. Recommend admission for MR to eval for acute CVA vs recurrence of small metastatic lesion. Pt in agreement. Lobo Jason MD

## 2024-09-27 NOTE — ED PROVIDER NOTE - NIH STROKE SCALE: 7. LIMB ATAXIA, QM
(0) Absent Griseofulvin Pregnancy And Lactation Text: This medication is Pregnancy Category X and is known to cause serious birth defects. It is unknown if this medication is excreted in breast milk but breast feeding should be avoided.

## 2024-09-27 NOTE — HISTORY OF PRESENT ILLNESS
[FreeTextEntry1] : 37 y/o female with history of Stage-4 breast cancer admitted with recurrent symptomatic anemia.  She had been admitted multiple times.  She had been evaluated with EGD, colonoscopy, CT enterography, capsule endoscopy and then repeat EGD and colonoscopy at [facility] on 16 January.  She underwent bone marrow.  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  She has been recently started on somatostatin.  She has been also been given IV iron on a weekly basis.  She had endoscopy and then a sigmoidoscopy performed recently.  At one point of time she had duodenal AVMs.  She had been on somatostatin which has not helped really helped her.  She is here with her father.  She follows up regularly with Dr. Chapa

## 2024-09-28 DIAGNOSIS — C50.919 MALIGNANT NEOPLASM OF UNSPECIFIED SITE OF UNSPECIFIED FEMALE BREAST: ICD-10-CM

## 2024-09-28 DIAGNOSIS — R20.0 ANESTHESIA OF SKIN: ICD-10-CM

## 2024-09-28 DIAGNOSIS — D64.9 ANEMIA, UNSPECIFIED: ICD-10-CM

## 2024-09-28 DIAGNOSIS — F41.9 ANXIETY DISORDER, UNSPECIFIED: ICD-10-CM

## 2024-09-28 DIAGNOSIS — E87.6 HYPOKALEMIA: ICD-10-CM

## 2024-09-28 LAB
A1C WITH ESTIMATED AVERAGE GLUCOSE RESULT: 4.2 % — SIGNIFICANT CHANGE UP (ref 4–5.6)
ALBUMIN SERPL ELPH-MCNC: 3.2 G/DL — LOW (ref 3.3–5.2)
ALP SERPL-CCNC: 238 U/L — HIGH (ref 40–120)
ALT FLD-CCNC: 19 U/L — SIGNIFICANT CHANGE UP
ANION GAP SERPL CALC-SCNC: 8 MMOL/L — SIGNIFICANT CHANGE UP (ref 5–17)
ANISOCYTOSIS BLD QL: SLIGHT — SIGNIFICANT CHANGE UP
AST SERPL-CCNC: 39 U/L — HIGH
BASOPHILS # BLD AUTO: 0 K/UL — SIGNIFICANT CHANGE UP (ref 0–0.2)
BASOPHILS NFR BLD AUTO: 0 % — SIGNIFICANT CHANGE UP (ref 0–2)
BILIRUB SERPL-MCNC: 0.8 MG/DL — SIGNIFICANT CHANGE UP (ref 0.4–2)
BUN SERPL-MCNC: 8.3 MG/DL — SIGNIFICANT CHANGE UP (ref 8–20)
CALCIUM SERPL-MCNC: 8.4 MG/DL — SIGNIFICANT CHANGE UP (ref 8.4–10.5)
CHLORIDE SERPL-SCNC: 106 MMOL/L — SIGNIFICANT CHANGE UP (ref 96–108)
CHOLEST SERPL-MCNC: 144 MG/DL — SIGNIFICANT CHANGE UP
CO2 SERPL-SCNC: 28 MMOL/L — SIGNIFICANT CHANGE UP (ref 22–29)
CREAT SERPL-MCNC: 0.53 MG/DL — SIGNIFICANT CHANGE UP (ref 0.5–1.3)
CULTURE RESULTS: SIGNIFICANT CHANGE UP
DACRYOCYTES BLD QL SMEAR: SLIGHT — SIGNIFICANT CHANGE UP
EGFR: 122 ML/MIN/1.73M2 — SIGNIFICANT CHANGE UP
ELLIPTOCYTES BLD QL SMEAR: SLIGHT — SIGNIFICANT CHANGE UP
EOSINOPHIL # BLD AUTO: 0.1 K/UL — SIGNIFICANT CHANGE UP (ref 0–0.5)
EOSINOPHIL NFR BLD AUTO: 3.5 % — SIGNIFICANT CHANGE UP (ref 0–6)
ESTIMATED AVERAGE GLUCOSE: 74 MG/DL — SIGNIFICANT CHANGE UP (ref 68–114)
GIANT PLATELETS BLD QL SMEAR: PRESENT — SIGNIFICANT CHANGE UP
GLUCOSE SERPL-MCNC: 102 MG/DL — HIGH (ref 70–99)
HCT VFR BLD CALC: 30.9 % — LOW (ref 34.5–45)
HDLC SERPL-MCNC: 64 MG/DL — SIGNIFICANT CHANGE UP
HGB BLD-MCNC: 9.5 G/DL — LOW (ref 11.5–15.5)
LIPID PNL WITH DIRECT LDL SERPL: 66 MG/DL — SIGNIFICANT CHANGE UP
LYMPHOCYTES # BLD AUTO: 0.55 K/UL — LOW (ref 1–3.3)
LYMPHOCYTES # BLD AUTO: 19.3 % — SIGNIFICANT CHANGE UP (ref 13–44)
MACROCYTES BLD QL: SLIGHT — SIGNIFICANT CHANGE UP
MANUAL SMEAR VERIFICATION: SIGNIFICANT CHANGE UP
MCHC RBC-ENTMCNC: 30.1 PG — SIGNIFICANT CHANGE UP (ref 27–34)
MCHC RBC-ENTMCNC: 30.7 GM/DL — LOW (ref 32–36)
MCV RBC AUTO: 97.8 FL — SIGNIFICANT CHANGE UP (ref 80–100)
MICROCYTES BLD QL: SLIGHT — SIGNIFICANT CHANGE UP
MONOCYTES # BLD AUTO: 0.25 K/UL — SIGNIFICANT CHANGE UP (ref 0–0.9)
MONOCYTES NFR BLD AUTO: 8.8 % — SIGNIFICANT CHANGE UP (ref 2–14)
NEUTROPHILS # BLD AUTO: 1.96 K/UL — SIGNIFICANT CHANGE UP (ref 1.8–7.4)
NEUTROPHILS NFR BLD AUTO: 68.4 % — SIGNIFICANT CHANGE UP (ref 43–77)
NON HDL CHOLESTEROL: 80 MG/DL — SIGNIFICANT CHANGE UP
NRBC # BLD: 1 /100 WBCS — HIGH (ref 0–0)
OVALOCYTES BLD QL SMEAR: SLIGHT — SIGNIFICANT CHANGE UP
PLAT MORPH BLD: NORMAL — SIGNIFICANT CHANGE UP
PLATELET # BLD AUTO: 138 K/UL — LOW (ref 150–400)
POIKILOCYTOSIS BLD QL AUTO: SLIGHT — SIGNIFICANT CHANGE UP
POLYCHROMASIA BLD QL SMEAR: SLIGHT — SIGNIFICANT CHANGE UP
POTASSIUM SERPL-MCNC: 4.2 MMOL/L — SIGNIFICANT CHANGE UP (ref 3.5–5.3)
POTASSIUM SERPL-SCNC: 4.2 MMOL/L — SIGNIFICANT CHANGE UP (ref 3.5–5.3)
PROT SERPL-MCNC: 5.2 G/DL — LOW (ref 6.6–8.7)
RBC # BLD: 3.16 M/UL — LOW (ref 3.8–5.2)
RBC # FLD: 17.5 % — HIGH (ref 10.3–14.5)
RBC BLD AUTO: ABNORMAL
SMUDGE CELLS # BLD: PRESENT — SIGNIFICANT CHANGE UP
SODIUM SERPL-SCNC: 142 MMOL/L — SIGNIFICANT CHANGE UP (ref 135–145)
SPECIMEN SOURCE: SIGNIFICANT CHANGE UP
TRIGL SERPL-MCNC: 69 MG/DL — SIGNIFICANT CHANGE UP
WBC # BLD: 2.87 K/UL — LOW (ref 3.8–10.5)
WBC # FLD AUTO: 2.87 K/UL — LOW (ref 3.8–10.5)

## 2024-09-28 PROCEDURE — 70551 MRI BRAIN STEM W/O DYE: CPT | Mod: 26,59

## 2024-09-28 PROCEDURE — 93010 ELECTROCARDIOGRAM REPORT: CPT

## 2024-09-28 PROCEDURE — 99223 1ST HOSP IP/OBS HIGH 75: CPT

## 2024-09-28 PROCEDURE — 95718 EEG PHYS/QHP 2-12 HR W/VEEG: CPT

## 2024-09-28 PROCEDURE — 70553 MRI BRAIN STEM W/O & W/DYE: CPT | Mod: 26

## 2024-09-28 RX ORDER — FLUOXETINE HCL 20 MG
80 CAPSULE ORAL AT BEDTIME
Refills: 0 | Status: DISCONTINUED | OUTPATIENT
Start: 2024-09-28 | End: 2024-10-04

## 2024-09-28 RX ORDER — METHADONE HYDROCHLORIDE 1 G/G
1 POWDER ORAL
Refills: 0 | DISCHARGE

## 2024-09-28 RX ORDER — PANTOPRAZOLE SODIUM 40 MG/1
40 TABLET, DELAYED RELEASE ORAL EVERY 12 HOURS
Refills: 0 | Status: DISCONTINUED | OUTPATIENT
Start: 2024-09-28 | End: 2024-10-04

## 2024-09-28 RX ORDER — METHADONE HYDROCHLORIDE 10 MG/1
1 TABLET ORAL
Refills: 0 | DISCHARGE

## 2024-09-28 RX ORDER — ENOXAPARIN SODIUM 150 MG/ML
40 INJECTION SUBCUTANEOUS EVERY 24 HOURS
Refills: 0 | Status: DISCONTINUED | OUTPATIENT
Start: 2024-09-28 | End: 2024-09-28

## 2024-09-28 RX ORDER — DIAZEPAM 10 MG/1
2 TABLET ORAL AT BEDTIME
Refills: 0 | Status: DISCONTINUED | OUTPATIENT
Start: 2024-09-28 | End: 2024-10-04

## 2024-09-28 RX ORDER — PREGABALIN 25 MG/1
200 CAPSULE ORAL THREE TIMES A DAY
Refills: 0 | Status: DISCONTINUED | OUTPATIENT
Start: 2024-09-28 | End: 2024-10-04

## 2024-09-28 RX ORDER — ASPIRIN 325 MG
81 TABLET ORAL DAILY
Refills: 0 | Status: DISCONTINUED | OUTPATIENT
Start: 2024-09-28 | End: 2024-09-28

## 2024-09-28 RX ORDER — ALPRAZOLAM 0.5 MG/1
0.5 TABLET ORAL ONCE
Refills: 0 | Status: DISCONTINUED | OUTPATIENT
Start: 2024-09-28 | End: 2024-09-28

## 2024-09-28 RX ORDER — FUROSEMIDE 10 MG/ML
40 INJECTION INTRAVENOUS DAILY
Refills: 0 | Status: DISCONTINUED | OUTPATIENT
Start: 2024-09-28 | End: 2024-10-03

## 2024-09-28 RX ORDER — METHADONE HYDROCHLORIDE 10 MG/1
15 TABLET ORAL
Refills: 0 | Status: DISCONTINUED | OUTPATIENT
Start: 2024-09-28 | End: 2024-10-04

## 2024-09-28 RX ORDER — HYDROMORPHONE HYDROCHLORIDE 1 MG/ML
1 INJECTION, SOLUTION INTRAMUSCULAR; INTRAVENOUS; SUBCUTANEOUS ONCE
Refills: 0 | Status: DISCONTINUED | OUTPATIENT
Start: 2024-09-28 | End: 2024-09-28

## 2024-09-28 RX ORDER — HYDROMORPHONE HYDROCHLORIDE 1 MG/ML
1 INJECTION, SOLUTION INTRAMUSCULAR; INTRAVENOUS; SUBCUTANEOUS EVERY 6 HOURS
Refills: 0 | Status: DISCONTINUED | OUTPATIENT
Start: 2024-09-28 | End: 2024-09-28

## 2024-09-28 RX ORDER — ALBUTEROL 90 MCG
2 AEROSOL (GRAM) INHALATION EVERY 6 HOURS
Refills: 0 | Status: DISCONTINUED | OUTPATIENT
Start: 2024-09-28 | End: 2024-10-04

## 2024-09-28 RX ORDER — HYDROMORPHONE HYDROCHLORIDE 1 MG/ML
1 INJECTION, SOLUTION INTRAMUSCULAR; INTRAVENOUS; SUBCUTANEOUS EVERY 4 HOURS
Refills: 0 | Status: DISCONTINUED | OUTPATIENT
Start: 2024-09-28 | End: 2024-10-04

## 2024-09-28 RX ORDER — METHADONE HYDROCHLORIDE 10 MG/1
10 TABLET ORAL
Refills: 0 | Status: DISCONTINUED | OUTPATIENT
Start: 2024-09-28 | End: 2024-10-04

## 2024-09-28 RX ORDER — MEMANTINE 10 MG/1
1 TABLET ORAL
Refills: 0 | DISCHARGE

## 2024-09-28 RX ORDER — HYDROMORPHONE HYDROCHLORIDE 1 MG/ML
2 INJECTION, SOLUTION INTRAMUSCULAR; INTRAVENOUS; SUBCUTANEOUS EVERY 4 HOURS
Refills: 0 | Status: DISCONTINUED | OUTPATIENT
Start: 2024-09-28 | End: 2024-09-29

## 2024-09-28 RX ORDER — MEMANTINE 10 MG/1
10 TABLET ORAL EVERY 12 HOURS
Refills: 0 | Status: DISCONTINUED | OUTPATIENT
Start: 2024-09-28 | End: 2024-10-04

## 2024-09-28 RX ORDER — METHYLPHENIDATE HCL 54 MG
20 TABLET, EXTENDED RELEASE 24 HR ORAL
Refills: 0 | Status: DISCONTINUED | OUTPATIENT
Start: 2024-09-28 | End: 2024-09-30

## 2024-09-28 RX ORDER — METHYLPHENIDATE HCL 54 MG
1 TABLET, EXTENDED RELEASE 24 HR ORAL
Refills: 0 | DISCHARGE

## 2024-09-28 RX ADMIN — PANTOPRAZOLE SODIUM 40 MILLIGRAM(S): 40 TABLET, DELAYED RELEASE ORAL at 06:24

## 2024-09-28 RX ADMIN — HYDROMORPHONE HYDROCHLORIDE 1 MILLIGRAM(S): 1 INJECTION, SOLUTION INTRAMUSCULAR; INTRAVENOUS; SUBCUTANEOUS at 05:30

## 2024-09-28 RX ADMIN — FUROSEMIDE 40 MILLIGRAM(S): 10 INJECTION INTRAVENOUS at 06:23

## 2024-09-28 RX ADMIN — HYDROMORPHONE HYDROCHLORIDE 1 MILLIGRAM(S): 1 INJECTION, SOLUTION INTRAMUSCULAR; INTRAVENOUS; SUBCUTANEOUS at 02:00

## 2024-09-28 RX ADMIN — HYDROMORPHONE HYDROCHLORIDE 1 MILLIGRAM(S): 1 INJECTION, SOLUTION INTRAMUSCULAR; INTRAVENOUS; SUBCUTANEOUS at 19:04

## 2024-09-28 RX ADMIN — ALPRAZOLAM 0.5 MILLIGRAM(S): 0.5 TABLET ORAL at 11:15

## 2024-09-28 RX ADMIN — Medication 80 MILLIGRAM(S): at 21:22

## 2024-09-28 RX ADMIN — HYDROMORPHONE HYDROCHLORIDE 1 MILLIGRAM(S): 1 INJECTION, SOLUTION INTRAMUSCULAR; INTRAVENOUS; SUBCUTANEOUS at 18:34

## 2024-09-28 RX ADMIN — METHADONE HYDROCHLORIDE 15 MILLIGRAM(S): 10 TABLET ORAL at 21:21

## 2024-09-28 RX ADMIN — MEMANTINE 10 MILLIGRAM(S): 10 TABLET ORAL at 06:23

## 2024-09-28 RX ADMIN — MEMANTINE 10 MILLIGRAM(S): 10 TABLET ORAL at 18:35

## 2024-09-28 RX ADMIN — PREGABALIN 200 MILLIGRAM(S): 25 CAPSULE ORAL at 06:23

## 2024-09-28 RX ADMIN — Medication 81 MILLIGRAM(S): at 15:41

## 2024-09-28 RX ADMIN — HYDROMORPHONE HYDROCHLORIDE 1 MILLIGRAM(S): 1 INJECTION, SOLUTION INTRAMUSCULAR; INTRAVENOUS; SUBCUTANEOUS at 04:27

## 2024-09-28 RX ADMIN — METHADONE HYDROCHLORIDE 10 MILLIGRAM(S): 10 TABLET ORAL at 06:34

## 2024-09-28 RX ADMIN — HYDROMORPHONE HYDROCHLORIDE 2 MILLIGRAM(S): 1 INJECTION, SOLUTION INTRAMUSCULAR; INTRAVENOUS; SUBCUTANEOUS at 15:41

## 2024-09-28 RX ADMIN — PANTOPRAZOLE SODIUM 40 MILLIGRAM(S): 40 TABLET, DELAYED RELEASE ORAL at 18:35

## 2024-09-28 RX ADMIN — METHADONE HYDROCHLORIDE 10 MILLIGRAM(S): 10 TABLET ORAL at 15:41

## 2024-09-28 RX ADMIN — Medication 20 MILLIEQUIVALENT(S): at 02:52

## 2024-09-28 RX ADMIN — HYDROMORPHONE HYDROCHLORIDE 1 MILLIGRAM(S): 1 INJECTION, SOLUTION INTRAMUSCULAR; INTRAVENOUS; SUBCUTANEOUS at 10:54

## 2024-09-28 RX ADMIN — HYDROMORPHONE HYDROCHLORIDE 1 MILLIGRAM(S): 1 INJECTION, SOLUTION INTRAMUSCULAR; INTRAVENOUS; SUBCUTANEOUS at 01:27

## 2024-09-28 RX ADMIN — PREGABALIN 200 MILLIGRAM(S): 25 CAPSULE ORAL at 21:22

## 2024-09-28 RX ADMIN — HYDROMORPHONE HYDROCHLORIDE 2 MILLIGRAM(S): 1 INJECTION, SOLUTION INTRAMUSCULAR; INTRAVENOUS; SUBCUTANEOUS at 16:41

## 2024-09-28 RX ADMIN — Medication 4 MILLIGRAM(S): at 21:22

## 2024-09-28 RX ADMIN — PREGABALIN 200 MILLIGRAM(S): 25 CAPSULE ORAL at 15:41

## 2024-09-28 RX ADMIN — HYDROMORPHONE HYDROCHLORIDE 2 MILLIGRAM(S): 1 INJECTION, SOLUTION INTRAMUSCULAR; INTRAVENOUS; SUBCUTANEOUS at 22:08

## 2024-09-28 RX ADMIN — Medication 80 MILLIGRAM(S): at 21:27

## 2024-09-28 RX ADMIN — HYDROMORPHONE HYDROCHLORIDE 1 MILLIGRAM(S): 1 INJECTION, SOLUTION INTRAMUSCULAR; INTRAVENOUS; SUBCUTANEOUS at 09:54

## 2024-09-28 RX ADMIN — Medication 20 MILLIGRAM(S): at 06:24

## 2024-09-28 RX ADMIN — HYDROMORPHONE HYDROCHLORIDE 2 MILLIGRAM(S): 1 INJECTION, SOLUTION INTRAMUSCULAR; INTRAVENOUS; SUBCUTANEOUS at 21:08

## 2024-09-28 NOTE — PHYSICAL THERAPY INITIAL EVALUATION ADULT - ADDITIONAL COMMENTS
Pt lives in a house with parents, sister, nephews, fiance and 7 year old son; family is supportive and someone is always able to assist. 4-5 LUPE with bilateral handrail, 2 steps inside no handrail and flight to basement no handrail for laundry.

## 2024-09-28 NOTE — H&P ADULT - ASSESSMENT
39 y/o female with h/o metastatic breast cancer , mets to brain came to the er for lt. facial  numbness and droop. will be admitted for stroke work up.

## 2024-09-28 NOTE — CONSULT NOTE ADULT - SUBJECTIVE AND OBJECTIVE BOX
[facility]                                     Neurology at [place]                                 Raul Florentino, & Naveed                                  [address]                                        [place], [place], [address]                                             [phone]    CC:  HPI:  The patient is a 38y Female who presented with left sided numbness and weakness that started yesterday.  Her father noticed that she was having intermittent facial weakness.  She has stage 4 breast cancer, mets to brain and bone.  She has multiple brain mets, is s/p both whole brain XRT and focussed gamma knife surgery. She has been off chemo due to anemia.  Neurology is asked to evaluate.    PAST MEDICAL & SURGICAL HISTORY:  H/O compression fracture of spine      Anxiety      Metastatic breast cancer      H/O pleural effusion      Pericardial effusion      S/P tonsillectomy      H/O chest tube placement  12/23/21      S/P pericardiocentesis  12/28/21      MEDICATIONS  (STANDING):  aspirin enteric coated 81 milliGRAM(s) Oral daily  enoxaparin Injectable 40 milliGRAM(s) SubCutaneous every 24 hours  FLUoxetine 80 milliGRAM(s) Oral at bedtime  furosemide    Tablet 40 milliGRAM(s) Oral daily  memantine 10 milliGRAM(s) Oral every 12 hours  methadone    Tablet 15 milliGRAM(s) Oral <User Schedule>  methadone    Tablet 10 milliGRAM(s) Oral <User Schedule>  methylphenidate 20 milliGRAM(s) Oral two times a day  pantoprazole    Tablet 40 milliGRAM(s) Oral every 12 hours  pregabalin 200 milliGRAM(s) Oral three times a day    MEDICATIONS  (PRN):  albuterol    90 MICROgram(s) HFA Inhaler 2 Puff(s) Inhalation every 6 hours PRN Shortness of Breath and/or Wheezing  diazepam    Tablet 2 milliGRAM(s) Oral at bedtime PRN insomnia  HYDROmorphone  Injectable 1 milliGRAM(s) IV Push every 4 hours PRN Breakthrough Pain  HYDROmorphone  Injectable 2 milliGRAM(s) IV Push every 4 hours PRN Severe Pain (7 - 10)  polyethylene glycol 3350 17 Gram(s) Oral daily PRN Constipation  simethicone 80 milliGRAM(s) Chew four times a day PRN Gas      Allergies    pertuzumab (Other (Severe))  Perjeta (Other (Severe))    Intolerances    SOCIAL HISTORY:  no tob,   no alcohol   no drugs    FAMILY HISTORY:  FH: CVA (cerebrovascular accident) (mother)    ROS: 14 point ROS negative other than what is present in HPI or below    Vital Signs Last 24 Hrs  T(C): 36.5 (28 Sep 2024 08:00), Max: 37 (27 Sep 2024 20:53)  T(F): 97.7 (28 Sep 2024 08:00), Max: 98.6 (27 Sep 2024 20:53)  HR: 80 (28 Sep 2024 08:00) (61 - 80)  BP: 109/76 (28 Sep 2024 08:00) (102/69 - 115/65)  BP(mean): --  RR: 18 (28 Sep 2024 08:00) (18 - 20)  SpO2: 98% (28 Sep 2024 08:00) (98% - 100%)    Parameters below as of 28 Sep 2024 08:00  Patient On (Oxygen Delivery Method): room air      General: NAD    Detailed Neurologic Exam:    Mental status: The patient is awake and alert and has normal attention span.  The patient is fully oriented in 3 spheres. The patient is oriented to current events. The patient is able to name objects, follow commands, repeat sentences. Cerebellar dysarhtria    Cranial nerves: Pupils equal and react symmetrically to light. There is no visual field deficit to confrontation. Extraocular motion is full with no nystagmus. There is no ptosis. Facial sensation is intact. Facial musculature is asymmetric very slight left facial weakness. Palate elevates symmetrically. Tongue is midline.    Motor: There is normal bulk and tone.  There is no tremor.  Strength is 5/5 in the right arm and leg.   Strength is 4+/5 in the left arm and 4/5 leg.    Sensation: left dory sensory loss    Cerebellar: There is no dysmetria on finger to nose testing.    Gait : deferred    LABS:                         9.5    2.87  )-----------( 138      ( 28 Sep 2024 04:38 )             30.9       09-28    142  |  106  |  8.3  ----------------------------<  102[H]  4.2   |  28.0  |  0.53    Ca    8.4      28 Sep 2024 04:38    TPro  5.2[L]  /  Alb  3.2[L]  /  TBili  0.8  /  DBili  x   /  AST  39[H]  /  ALT  19  /  AlkPhos  238[H]  09-28      PT/INR - ( 27 Sep 2024 20:55 )   PT: 13.8 sec;   INR: 1.19 ratio         PTT - ( 27 Sep 2024 20:55 )  PTT:39.7 sec    RADIOLOGY & ADDITIONAL STUDIES (independently reviewed unless otherwise noted):  MR Head No Cont (09.28.24 @ 08:44)  IMPRESSION:  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  Patient shouldreturn for contrast-enhanced images.  Marked progression of edema in the right cerebellum extending into the   right brachium pontis and cranial nerves V root entry zone.

## 2024-09-28 NOTE — OCCUPATIONAL THERAPY INITIAL EVALUATION ADULT - DIAGNOSIS, OT EVAL
38 year old Female with h/o metastatic breast cancer, mets to brain came to ER for L facial  numbness and droop. Pt admitted for stroke work up.

## 2024-09-28 NOTE — PHYSICAL THERAPY INITIAL EVALUATION ADULT - IMPAIRMENTS CONTRIBUTING TO GAIT DEVIATIONS, PT EVAL
Per last OV notes on 4/24: pt is to stop Flomax. Called pharmacy and relayed above. Verbalized understanding. pain/decreased strength

## 2024-09-28 NOTE — CHART NOTE - NSCHARTNOTEFT_GEN_A_CORE
Patient seen and examined.  Pt has metastatic Breat cancer, receives care from Dr. michelle COELLO  Full consult to follow  Scenario susp for new brain mets with edema  Will review outpt records  Bec;  Begin steroids fo redema

## 2024-09-28 NOTE — CONSULT NOTE ADULT - ASSESSMENT
The patient is a 38y Female who is followed by neurology because of left sided weakness/numbness    Metastatic beast cancer to brain  Her symptoms are likely secondary to metastases  No evidence of stroke on MRI  suggest repeat brain MRI with contrast  I ordered a video EEG to rule out seizure  avoid antiplatelt/anticoagulants with hemorrhagic metastasis  [facility]    will follow with you    Shashi Carter MD PhD   [id number]

## 2024-09-28 NOTE — OCCUPATIONAL THERAPY INITIAL EVALUATION ADULT - ADDITIONAL COMMENTS
Pt has a shower with doors and suction grab bars  Pt owns a shower chair and RW  Pt is R handed and does not drive

## 2024-09-28 NOTE — PATIENT PROFILE ADULT - FUNCTIONAL ASSESSMENT - BASIC MOBILITY 6.
3-calculated by average/Not able to assess (calculate score using [facility] averaging method)

## 2024-09-28 NOTE — PATIENT PROFILE ADULT - NSPRESCRUSEDDRG_GEN_A_NUR
SUBJECTIVE:   Froy Loredo is a 82 year old male presenting with a chief complaint of   Chief Complaint   Patient presents with     Shoulder Pain     left shoulder pain for a few days, no known injury     Patient has a new left sided shoulder pain that he describes as an ache that is worse with any sort of shoulder movement. He has full ROM and states that he does not have any numbness, tingling, or weakness. He denies any recent trauma or having lifted something heavy recently and states that he woke up with the pain.     He is an established patient of [facility].    Review of Systems  Review Of Systems  Skin: negative  Eyes: negative  Ears/Nose/Throat: negative  Respiratory: No shortness of breath, dyspnea on exertion, cough, or hemoptysis  Cardiovascular: negative  Gastrointestinal: negative  Genitourinary: negative  Musculoskeletal: See HPI  Neurologic: negative  Psychiatric: negative  Hematologic/Lymphatic/Immunologic: negative  Endocrine: negative    Past Medical History:   Diagnosis Date     Actinic keratosis      Basal cell carcinoma 07/02/2008    left chest     BPH (benign prostatic hypertrophy) with urinary obstruction <2003     Eczema      PSC (posterior subcapsular cataract), left 5/10/2016     Skin cancer 11/8/2006    sccis L helix     Squamous cell carcinoma in situ of skin 6/05/2013    Left superior upper chest,Left inferior chest,Left mid back     Family History   Problem Relation Age of Onset     Diabetes Maternal Grandfather         90's     Other Cancer Other      C.A.D. No family hx of      Cancer No family hx of      Hypertension No family hx of      Cerebrovascular Disease No family hx of      Thyroid Disease No family hx of      Glaucoma No family hx of      Macular Degeneration No family hx of      Breast Cancer No family hx of      Prostate Cancer No family hx of      Depression No family hx of      Anxiety Disorder No family hx of      Mental Illness No family hx of      Substance Abuse  No family hx of      Anesthesia Reaction No family hx of      Asthma No family hx of      Osteoporosis No family hx of      Genetic Disorder No family hx of      Obesity No family hx of      Unknown/Adopted No family hx of      Current Outpatient Medications   Medication Sig Dispense Refill     finasteride (PROSCAR) 5 MG tablet Take 1 tablet (5 mg) by mouth daily 90 tablet 1     fluocinolone acetonide 0.01 % OIL Use 4-5 drops in right ear 1-2 times daily as needed for itching. 20 mL 1     MULTI-VITAMIN OR  1 tablet DAILY       polyethylene glycol 0.4%- propylene glycol 0.3% (SYSTANE ULTRA) 0.4-0.3 % SOLN ophthalmic solution Place 1 drop into both eyes 2 times daily       sulindac (CLINORIL) 200 MG tablet Take 1 tablet (200 mg) by mouth 2 times daily 60 tablet 11     tamsulosin (FLOMAX) 0.4 MG capsule TAKE 2 CAPSULES BY MOUTH EVERY DAY 60 capsule 0     VITAMIN C OR 2 TABLETS DAILY       Social History     Tobacco Use     Smoking status: Never Smoker     Smokeless tobacco: Never Used     Tobacco comment: Lives in smoke free household   Substance Use Topics     Alcohol use: No     Alcohol/week: 0.0 standard drinks       OBJECTIVE  /76   Pulse 106   Temp 98.3  F (36.8  C) (Oral)   Wt 70.3 kg (155 lb)   SpO2 96%   BMI 24.28 kg/m      Physical Exam    Exam:  Constitutional: healthy, alert and no distress  Head: Normocephalic. No masses, lesions, tenderness or abnormalities  Neck: Neck supple. No adenopathy. Thyroid symmetric, normal size,, Carotids without bruits.  ENT: ENT exam normal, no neck nodes or sinus tenderness  Cardiovascular: negative, PMI normal. No lifts, heaves, or thrills. RRR. No murmurs, clicks gallops or rub  Respiratory: negative, Percussion normal. Good diaphragmatic excursion. Lungs clear  Gastrointestinal: Abdomen soft, non-tender. BS normal. No masses, organomegaly  : Deferred  Musculoskeletal: extremities normal- no gross deformities noted, gait normal and normal muscle tone. There is  increased pain with abduction above 90 degrees. Baez, spurling, and neers are negative.   Skin: no suspicious lesions or rashes  Neurologic: Gait normal. Reflexes normal and symmetric. Sensation grossly WNL.  Psychiatric: mentation appears normal and affect normal/bright  Hematologic/Lymphatic/Immunologic: Normal cervical lymph nodes      ASSESSMENT/PLAN:    (X75.353S) Strain of left shoulder, initial encounter  (primary encounter diagnosis)  Plan: lidocaine (XYLOCAINE) 5 % external ointment,         cyclobenzaprine (FLEXERIL) 10 MG tablet  Rest the affected area as much as possible.  Apply ice for 15-20 minutes intermittently as needed and especially after any offending activity. Hot packs are better for muscle spasms and cramping. Daily stretching as tolerated.  As pain recedes, begin normal activities slowly as tolerated.  Consider Physical Therapy after 6 weeks if symptoms not better with conservative care.      Okay to take acetaminophen 500 mg- 2 tabs (Total of 1000 mg) every 8 hrs   Okay to take ibuprofen 200 mg- 3 tabs (Total of 600 mg) every 6 hours      Patient was advised to return to clinic if symptoms do not improve in the amount of time specified in the AVS or if symptoms worsen. Patient educated on red flag symptoms and asked to go directly to the ED if symptoms present themselves.     Brian Westfall PA-C on 3/13/2020 at 2:05 PM       No

## 2024-09-28 NOTE — PHYSICAL THERAPY INITIAL EVALUATION ADULT - PERTINENT HX OF CURRENT PROBLEM, REHAB EVAL
Left pt as received semifowler in bed, NAD, +IV lock, +Tele/ on RA A&Ox4. Pre/post pain present-generalized pt reports always in pain; RN aware.

## 2024-09-28 NOTE — CHART NOTE - NSCHARTNOTEFT_GEN_A_CORE
Pt seen and examined at bedside. Labs and imaging reviewed. Agree with H&P. MRI brain with 0.9 cm left posterior cingulate gyrus hemorrhagic metastasis, new since previous brain MRI from 2023. Marked progression of edema in the right cerebellum extending into the   right brachium pontis and cranial nerves V root entry zone. Heme/onc consulted.     Assessment/Plan:  37 y/o female with h/o metastatic breast cancer , mets to brain came to the er for lt. facial  numbness and droop  admitted for stroke work up. MRI brain with new hemorrhagic mets.       #Lt facial numbness  #MRI brain with new hemorrhagic Metastasis  #Stroke r/o  CT scan studies for stroke no acute findings.   follow mri  neuro checks.   follow lipid panel and statin plan per findings, ct studies no acute findings for stroke.   recent echo from july 2024, EF 55-60 %  lovenox for dvt prophylaxis.  neuro consult   Heme/onc consulted     #Hx of Metastatic Breast cancer  -pt follows with Dr Chapa at [facility]  -Hemeonc consult placed   -Pain meds resumed as per home regimen  -Also on Dilaudid 2mg iv q4h PRN for severe pain  -Dilaudid 1mg IV q4h PRN for breakthrough  -Pain mx consult on monday    #Hypokalemia.   -Monitor BMP    #Anxiety  #Insomnia  -continue pt's prozac  -Valium at bedtime     #Normocytic Anemia likely AOCD  -Today Hb 10.2, anemia in the setting of metastatic breast cancer. got 2 units of prbc 3 days ago for Hb 6.8.  -Check iron panel in AM    Dispo: medically acute Pt seen and examined at bedside. Labs and imaging reviewed. Agree with H&P. MRI brain with 0.9 cm left posterior cingulate gyrus hemorrhagic metastasis, new since previous brain MRI from 2023. Marked progression of edema in the right cerebellum extending into the   right brachium pontis and cranial nerves V root entry zone. Heme/onc consulted.     Assessment/Plan:  39 y/o female with h/o metastatic breast cancer , mets to brain came to the er for lt. facial  numbness and droop  admitted for stroke work up. MRI brain with new hemorrhagic mets.       #Lt facial numbness  #MRI brain with new hemorrhagic Metastasis  #Stroke r/o  CT scan studies for stroke no acute findings.   follow mri  neuro checks.   follow lipid panel and statin plan per findings, ct studies no acute findings for stroke.   recent echo from july 2024, EF 55-60 %  Holding Asa and lovenox  neuro consult   Heme/onc consulted     #Hx of Metastatic Breast cancer  -pt follows with Dr [facility]a at [facility]  -Hemeonc consult placed   -Pain meds resumed as per home regimen  -Also on Dilaudid 2mg iv q4h PRN for severe pain  -Dilaudid 1mg IV q4h PRN for breakthrough  -Pain mx consult on monday    #Hypokalemia.   -Monitor BMP    #Anxiety  #Insomnia  -continue pt's prozac  -Valium at bedtime     #Normocytic Anemia likely AOCD  -Today Hb 10.2, anemia in the setting of metastatic breast cancer. got 2 units of prbc 3 days ago for Hb 6.8.  -Check iron panel in AM    Dispo: medically acute

## 2024-09-28 NOTE — H&P ADULT - PROBLEM SELECTOR PLAN 5
Today Hb 10.2, anemia in the setting of metastatic breast cancer. got 2 units of prbc 3 days ago for Hb 6.8.

## 2024-09-28 NOTE — OCCUPATIONAL THERAPY INITIAL EVALUATION ADULT - VISUAL ACUITY
Pt reports feels L eyelid is "heavy" RN aware, no complaints in vision. Central and peripheral fields intact bilaterally

## 2024-09-28 NOTE — OCCUPATIONAL THERAPY INITIAL EVALUATION ADULT - GENERAL OBSERVATIONS, REHAB EVAL
Left pt as received semifowler in bed, NAD, +IV lock, +Tele/ on RA A&Ox4. Pre/post pain present-generalized pt reports always in pain; RN aware. Pt agreeable to OT evaluation

## 2024-09-28 NOTE — OCCUPATIONAL THERAPY INITIAL EVALUATION ADULT - LIVES WITH, PROFILE
Pt lives in a house with parents, sister, nephews, fiance and 7 year old son; family is supportive and someone is always able to assist. 4-5 LUPE with bilateral handrail, 2 steps inside no handrail and flight to basement no handrail for laundry./children/other relative/parents/significant other

## 2024-09-28 NOTE — H&P ADULT - NSHPLABSRESULTS_GEN_ALL_CORE
july, 2024 ECHO :      1. Left ventricular cavity is normal in size. Left ventricular wall thickness is normal. Left ventricular systolic function is normal with an ejection fraction visually estimated at 55 to 60 %.   2. Normal left ventricular diastolic function.   3. Normal right ventricular cavity size and normal right ventricular systolic function.   4. The left atrium is normal in size.   5. The right atrium is normal in size.   6. Trileaflet aortic valve with normal systolic excursion.   7. Structurally normal mitral valve with normal leaflet excursion.   8. Trace mitral regurgitation.   9. Mild tricuspid regurgitation.  10. Estimated pulmonary artery systolic pressure is 23 mmHg, normal pulmonary artery pressure.  11. The interatrial septum appears intact.  12. No pericardial effusion seen.

## 2024-09-28 NOTE — H&P ADULT - NSHPPHYSICALEXAM_GEN_ALL_CORE
Vital Signs Last 24 Hrs  T(C): 37 (27 Sep 2024 20:53), Max: 37 (27 Sep 2024 20:53)  T(F): 98.6 (27 Sep 2024 20:53), Max: 98.6 (27 Sep 2024 20:53)  HR: 65 (28 Sep 2024 01:20) (61 - 66)  BP: 115/65 (28 Sep 2024 01:20) (102/69 - 115/65)  BP(mean): --  RR: 18 (28 Sep 2024 01:20) (18 - 20)  SpO2: 98% (28 Sep 2024 01:20) (98% - 100%)    Parameters below as of 28 Sep 2024 01:20  Patient On (Oxygen Delivery Method): room air    General: pt. in bed not in distress  HEENT: AT, NC. PERRL. intact EOM. mild rt. facial droop ? no throat erythema or exudate.   Neck: supple. no JVD.   Chest: air entry fair bilaterally, no w /r/r.  Heart:  S1,S2. RRR. no heart murmur. no edema.   Abdomen: soft. non-tender. non-distended. + BS.  Ext: no calf tenderness. moves all ext. without any restrictions.   vascular : DP 2 + B/L   Neuro: AAO x3. follows commands appropriately. motor 5/5 b/L , mild subjective lt. facial numbness, cns otherwise appear intact.  Skin: warm and dry  psych : mood ok, no si/hi Vital Signs Last 24 Hrs  T(C): 37 (27 Sep 2024 20:53), Max: 37 (27 Sep 2024 20:53)  T(F): 98.6 (27 Sep 2024 20:53), Max: 98.6 (27 Sep 2024 20:53)  HR: 65 (28 Sep 2024 01:20) (61 - 66)  BP: 115/65 (28 Sep 2024 01:20) (102/69 - 115/65)  BP(mean): --  RR: 18 (28 Sep 2024 01:20) (18 - 20)  SpO2: 98% (28 Sep 2024 01:20) (98% - 100%)    Parameters below as of 28 Sep 2024 01:20  Patient On (Oxygen Delivery Method): room air    General: pt. in bed not in distress  HEENT: AT, NC. PERRL. intact EOM. mild rt. facial droop ? no throat erythema or exudate.   Neck: supple. no JVD.   Chest: air entry fair bilaterally, no w /r/r.  Heart:  S1,S2. RRR. no heart murmur. no edema.   Abdomen: soft. non-tender. non-distended. + BS.  Ext: no calf tenderness. moves all ext. without any restrictions.   vascular : DP 2 + B/L   Ln : no palpable Ln.  Neuro: AAO x3. follows commands appropriately. motor 5/5 b/L , mild subjective lt. facial numbness, cns otherwise appear intact.  Skin: warm and dry  psych : mood ok, no si/hi

## 2024-09-28 NOTE — H&P ADULT - PROBLEM SELECTOR PLAN 1
will admit for stroke work up.  CT scan studies for stroke no acute findings.   follow mri  neuro checks.   follow lipid panel and statin plan per findings, ct studies no acute findings for stroke.   lovenox for dvt prophylaxis. will admit for stroke work up.  CT scan studies for stroke no acute findings.   follow mri  neuro checks.   follow lipid panel and statin plan per findings, ct studies no acute findings for stroke.   recent echo from july 2024, EF 55-60 %  lovenox for dvt prophylaxis.  neuro consult called by ER.

## 2024-09-28 NOTE — H&P ADULT - HISTORY OF PRESENT ILLNESS
37 y/o female with h/o metastatic breast cancer came to the er for lt. facial  numbness and droop which started around 6 pm on the day of ER visit, triage note at 20: 56 pm. code stroke called by ER. pt. stated that she felt mild LLE weakness as well. no other neuro deficits reported. no HA, no cp, no sob. no syncope or near syncope.  39 y/o female with h/o metastatic breast cancer , mets to brain came to the er for lt. facial  numbness and droop which started around 6 pm on the day of ER visit, triage note at 20: 56 pm. code stroke called by ER. pt. stated that she felt mild LLE weakness as well. no other neuro deficits reported. no HA, no cp, no sob. no syncope or near syncope. still feels some facial numbness on left, less than before.  pt. reports that her last chemo was about 3-4 months ago , follows with [facility]. plan to restart chemo was held as her Hb dropped to 6.8 and pt. was at our hospital 3 days ago for blood transfusion and got 2 units.

## 2024-09-28 NOTE — ED ADULT NURSE REASSESSMENT NOTE - NS ED NURSE REASSESS COMMENT FT1
admitting doctor contacted and asked to put in pain medication for pt. pt requesting pain meds, MD notified.

## 2024-09-28 NOTE — PATIENT PROFILE ADULT - FALL HARM RISK - HARM RISK INTERVENTIONS

## 2024-09-29 LAB
ANION GAP SERPL CALC-SCNC: 9 MMOL/L — SIGNIFICANT CHANGE UP (ref 5–17)
BUN SERPL-MCNC: 14.8 MG/DL — SIGNIFICANT CHANGE UP (ref 8–20)
CALCIUM SERPL-MCNC: 8.3 MG/DL — LOW (ref 8.4–10.5)
CHLORIDE SERPL-SCNC: 104 MMOL/L — SIGNIFICANT CHANGE UP (ref 96–108)
CO2 SERPL-SCNC: 28 MMOL/L — SIGNIFICANT CHANGE UP (ref 22–29)
CREAT SERPL-MCNC: 0.61 MG/DL — SIGNIFICANT CHANGE UP (ref 0.5–1.3)
EGFR: 118 ML/MIN/1.73M2 — SIGNIFICANT CHANGE UP
FERRITIN SERPL-MCNC: 251 NG/ML — HIGH (ref 15–150)
GLUCOSE BLDC GLUCOMTR-MCNC: 175 MG/DL — HIGH (ref 70–99)
GLUCOSE SERPL-MCNC: 119 MG/DL — HIGH (ref 70–99)
HCT VFR BLD CALC: 31.5 % — LOW (ref 34.5–45)
HGB BLD-MCNC: 9.6 G/DL — LOW (ref 11.5–15.5)
IRON SATN MFR SERPL: 39 UG/DL — SIGNIFICANT CHANGE UP (ref 37–145)
MAGNESIUM SERPL-MCNC: 2 MG/DL — SIGNIFICANT CHANGE UP (ref 1.6–2.6)
MCHC RBC-ENTMCNC: 30 PG — SIGNIFICANT CHANGE UP (ref 27–34)
MCHC RBC-ENTMCNC: 30.5 GM/DL — LOW (ref 32–36)
MCV RBC AUTO: 98.4 FL — SIGNIFICANT CHANGE UP (ref 80–100)
MRSA PCR RESULT.: SIGNIFICANT CHANGE UP
PLATELET # BLD AUTO: 130 K/UL — LOW (ref 150–400)
POTASSIUM SERPL-MCNC: 4 MMOL/L — SIGNIFICANT CHANGE UP (ref 3.5–5.3)
POTASSIUM SERPL-SCNC: 4 MMOL/L — SIGNIFICANT CHANGE UP (ref 3.5–5.3)
RBC # BLD: 3.2 M/UL — LOW (ref 3.8–5.2)
RBC # FLD: 17.8 % — HIGH (ref 10.3–14.5)
S AUREUS DNA NOSE QL NAA+PROBE: SIGNIFICANT CHANGE UP
SODIUM SERPL-SCNC: 141 MMOL/L — SIGNIFICANT CHANGE UP (ref 135–145)
WBC # BLD: 2.86 K/UL — LOW (ref 3.8–10.5)
WBC # FLD AUTO: 2.86 K/UL — LOW (ref 3.8–10.5)

## 2024-09-29 PROCEDURE — 99232 SBSQ HOSP IP/OBS MODERATE 35: CPT

## 2024-09-29 PROCEDURE — 95720 EEG PHY/QHP EA INCR W/VEEG: CPT

## 2024-09-29 RX ORDER — OCTREOTIDE ACETATE 50 UG/ML
100 INJECTION, SOLUTION INTRAVENOUS; SUBCUTANEOUS
Refills: 0 | Status: DISCONTINUED | OUTPATIENT
Start: 2024-09-29 | End: 2024-10-04

## 2024-09-29 RX ORDER — HYDROMORPHONE HYDROCHLORIDE 1 MG/ML
0.5 INJECTION, SOLUTION INTRAMUSCULAR; INTRAVENOUS; SUBCUTANEOUS ONCE
Refills: 0 | Status: DISCONTINUED | OUTPATIENT
Start: 2024-09-29 | End: 2024-09-29

## 2024-09-29 RX ORDER — ALPRAZOLAM 0.5 MG/1
1 TABLET ORAL ONCE
Refills: 0 | Status: DISCONTINUED | OUTPATIENT
Start: 2024-09-29 | End: 2024-09-29

## 2024-09-29 RX ORDER — HYDROMORPHONE HYDROCHLORIDE 1 MG/ML
2 INJECTION, SOLUTION INTRAMUSCULAR; INTRAVENOUS; SUBCUTANEOUS
Refills: 0 | Status: DISCONTINUED | OUTPATIENT
Start: 2024-09-29 | End: 2024-10-03

## 2024-09-29 RX ORDER — CHLORHEXIDINE GLUCONATE ORAL RINSE 1.2 MG/ML
1 SOLUTION DENTAL DAILY
Refills: 0 | Status: DISCONTINUED | OUTPATIENT
Start: 2024-09-29 | End: 2024-10-04

## 2024-09-29 RX ADMIN — HYDROMORPHONE HYDROCHLORIDE 2 MILLIGRAM(S): 1 INJECTION, SOLUTION INTRAMUSCULAR; INTRAVENOUS; SUBCUTANEOUS at 19:30

## 2024-09-29 RX ADMIN — HYDROMORPHONE HYDROCHLORIDE 2 MILLIGRAM(S): 1 INJECTION, SOLUTION INTRAMUSCULAR; INTRAVENOUS; SUBCUTANEOUS at 03:58

## 2024-09-29 RX ADMIN — Medication 4 MILLIGRAM(S): at 11:01

## 2024-09-29 RX ADMIN — PREGABALIN 200 MILLIGRAM(S): 25 CAPSULE ORAL at 06:41

## 2024-09-29 RX ADMIN — METHADONE HYDROCHLORIDE 15 MILLIGRAM(S): 10 TABLET ORAL at 21:35

## 2024-09-29 RX ADMIN — HYDROMORPHONE HYDROCHLORIDE 2 MILLIGRAM(S): 1 INJECTION, SOLUTION INTRAMUSCULAR; INTRAVENOUS; SUBCUTANEOUS at 02:58

## 2024-09-29 RX ADMIN — Medication 4 MILLIGRAM(S): at 21:37

## 2024-09-29 RX ADMIN — Medication 80 MILLIGRAM(S): at 21:39

## 2024-09-29 RX ADMIN — HYDROMORPHONE HYDROCHLORIDE 1 MILLIGRAM(S): 1 INJECTION, SOLUTION INTRAMUSCULAR; INTRAVENOUS; SUBCUTANEOUS at 21:45

## 2024-09-29 RX ADMIN — MEMANTINE 10 MILLIGRAM(S): 10 TABLET ORAL at 17:47

## 2024-09-29 RX ADMIN — HYDROMORPHONE HYDROCHLORIDE 1 MILLIGRAM(S): 1 INJECTION, SOLUTION INTRAMUSCULAR; INTRAVENOUS; SUBCUTANEOUS at 11:03

## 2024-09-29 RX ADMIN — HYDROMORPHONE HYDROCHLORIDE 2 MILLIGRAM(S): 1 INJECTION, SOLUTION INTRAMUSCULAR; INTRAVENOUS; SUBCUTANEOUS at 18:31

## 2024-09-29 RX ADMIN — MEMANTINE 10 MILLIGRAM(S): 10 TABLET ORAL at 06:42

## 2024-09-29 RX ADMIN — PANTOPRAZOLE SODIUM 40 MILLIGRAM(S): 40 TABLET, DELAYED RELEASE ORAL at 17:47

## 2024-09-29 RX ADMIN — HYDROMORPHONE HYDROCHLORIDE 1 MILLIGRAM(S): 1 INJECTION, SOLUTION INTRAMUSCULAR; INTRAVENOUS; SUBCUTANEOUS at 15:53

## 2024-09-29 RX ADMIN — METHADONE HYDROCHLORIDE 10 MILLIGRAM(S): 10 TABLET ORAL at 13:56

## 2024-09-29 RX ADMIN — METHADONE HYDROCHLORIDE 10 MILLIGRAM(S): 10 TABLET ORAL at 06:41

## 2024-09-29 RX ADMIN — FUROSEMIDE 40 MILLIGRAM(S): 10 INJECTION INTRAVENOUS at 06:41

## 2024-09-29 RX ADMIN — PREGABALIN 200 MILLIGRAM(S): 25 CAPSULE ORAL at 13:56

## 2024-09-29 RX ADMIN — ALPRAZOLAM 1 MILLIGRAM(S): 0.5 TABLET ORAL at 14:21

## 2024-09-29 RX ADMIN — PREGABALIN 200 MILLIGRAM(S): 25 CAPSULE ORAL at 21:36

## 2024-09-29 RX ADMIN — HYDROMORPHONE HYDROCHLORIDE 1 MILLIGRAM(S): 1 INJECTION, SOLUTION INTRAMUSCULAR; INTRAVENOUS; SUBCUTANEOUS at 12:00

## 2024-09-29 RX ADMIN — HYDROMORPHONE HYDROCHLORIDE 2 MILLIGRAM(S): 1 INJECTION, SOLUTION INTRAMUSCULAR; INTRAVENOUS; SUBCUTANEOUS at 14:24

## 2024-09-29 RX ADMIN — HYDROMORPHONE HYDROCHLORIDE 2 MILLIGRAM(S): 1 INJECTION, SOLUTION INTRAMUSCULAR; INTRAVENOUS; SUBCUTANEOUS at 08:51

## 2024-09-29 RX ADMIN — OCTREOTIDE ACETATE 100 MICROGRAM(S): 50 INJECTION, SOLUTION INTRAVENOUS; SUBCUTANEOUS at 21:38

## 2024-09-29 RX ADMIN — HYDROMORPHONE HYDROCHLORIDE 2 MILLIGRAM(S): 1 INJECTION, SOLUTION INTRAMUSCULAR; INTRAVENOUS; SUBCUTANEOUS at 15:20

## 2024-09-29 RX ADMIN — HYDROMORPHONE HYDROCHLORIDE 0.5 MILLIGRAM(S): 1 INJECTION, SOLUTION INTRAMUSCULAR; INTRAVENOUS; SUBCUTANEOUS at 18:29

## 2024-09-29 RX ADMIN — HYDROMORPHONE HYDROCHLORIDE 1 MILLIGRAM(S): 1 INJECTION, SOLUTION INTRAMUSCULAR; INTRAVENOUS; SUBCUTANEOUS at 16:50

## 2024-09-29 RX ADMIN — HYDROMORPHONE HYDROCHLORIDE 1 MILLIGRAM(S): 1 INJECTION, SOLUTION INTRAMUSCULAR; INTRAVENOUS; SUBCUTANEOUS at 21:37

## 2024-09-29 RX ADMIN — Medication 4 MILLIGRAM(S): at 02:58

## 2024-09-29 RX ADMIN — PANTOPRAZOLE SODIUM 40 MILLIGRAM(S): 40 TABLET, DELAYED RELEASE ORAL at 06:42

## 2024-09-29 RX ADMIN — CHLORHEXIDINE GLUCONATE ORAL RINSE 1 APPLICATION(S): 1.2 SOLUTION DENTAL at 21:53

## 2024-09-29 RX ADMIN — Medication 20 MILLIGRAM(S): at 06:41

## 2024-09-29 RX ADMIN — HYDROMORPHONE HYDROCHLORIDE 0.5 MILLIGRAM(S): 1 INJECTION, SOLUTION INTRAMUSCULAR; INTRAVENOUS; SUBCUTANEOUS at 17:47

## 2024-09-29 RX ADMIN — Medication 4 MILLIGRAM(S): at 15:52

## 2024-09-29 RX ADMIN — HYDROMORPHONE HYDROCHLORIDE 2 MILLIGRAM(S): 1 INJECTION, SOLUTION INTRAMUSCULAR; INTRAVENOUS; SUBCUTANEOUS at 09:50

## 2024-09-29 NOTE — PROGRESS NOTE ADULT - SUBJECTIVE AND OBJECTIVE BOX
Greta Lazar MD (Available on [employer])  [facility] Division of Hospital Medicine    Chief Complaint:  Facial numbness    SUBJECTIVE / OVERNIGHT EVENTS:  Pt seen and examined at bedside.     Patient denies chest pain, SOB, abd pain, N/V, fever, chills, dysuria or any other complaints. All remainder ROS negative.     INTERVAL EVENTS:  -Pt hemodynamically stable with no overnight events  -Neurology consult recs appreciated. EEG ordered. Suggests MRI brain w/contrast  -Heme/onc consulted. Dexamethasone ordered for cerebral edema   -    MEDICATIONS  (STANDING):  dexAMETHasone     Tablet 4 milliGRAM(s) Oral every 6 hours  FLUoxetine 80 milliGRAM(s) Oral at bedtime  furosemide    Tablet 40 milliGRAM(s) Oral daily  memantine 10 milliGRAM(s) Oral every 12 hours  methadone    Tablet 15 milliGRAM(s) Oral <User Schedule>  methadone    Tablet 10 milliGRAM(s) Oral <User Schedule>  methylphenidate 20 milliGRAM(s) Oral two times a day  pantoprazole    Tablet 40 milliGRAM(s) Oral every 12 hours  pregabalin 200 milliGRAM(s) Oral three times a day    MEDICATIONS  (PRN):  albuterol    90 MICROgram(s) HFA Inhaler 2 Puff(s) Inhalation every 6 hours PRN Shortness of Breath and/or Wheezing  diazepam    Tablet 2 milliGRAM(s) Oral at bedtime PRN insomnia  HYDROmorphone  Injectable 2 milliGRAM(s) IV Push every 4 hours PRN Severe Pain (7 - 10)  HYDROmorphone  Injectable 1 milliGRAM(s) IV Push every 4 hours PRN Breakthrough Pain  polyethylene glycol 3350 17 Gram(s) Oral daily PRN Constipation  simethicone 80 milliGRAM(s) Chew four times a day PRN Gas        I&O's Summary    28 Sep 2024 07:01  -  29 Sep 2024 07:00  --------------------------------------------------------  IN: 420 mL / OUT: 0 mL / NET: 420 mL        PHYSICAL EXAM:  Vital Signs Last 24 Hrs  T(C): 36.8 (29 Sep 2024 07:41), Max: 36.8 (29 Sep 2024 07:41)  T(F): 98.3 (29 Sep 2024 07:41), Max: 98.3 (29 Sep 2024 07:41)  HR: 65 (29 Sep 2024 07:41) (64 - 85)  BP: 96/62 (29 Sep 2024 07:41) (90/60 - 100/64)  BP(mean): 76 (29 Sep 2024 06:48) (72 - 76)  RR: 18 (29 Sep 2024 07:41) (17 - 18)  SpO2: 95% (29 Sep 2024 07:41) (95% - 100%)    Parameters below as of 29 Sep 2024 07:41  Patient On (Oxygen Delivery Method): room air            CONSTITUTIONAL: NAD  ENMT: Moist oral mucosa  RESPIRATORY: Normal respiratory effort; lungs are clear to auscultation bilaterally  CARDIOVASCULAR: Regular rate and rhythm, normal S1 and S2, no murmur/rub/gallop  ABDOMEN: Nontender to palpation, normoactive bowel sounds, no rebound/guarding; No hepatosplenomegaly  EXTREMITIES: No LE swelling   PSYCH: A+O to person, place, and time; affect appropriate  NEUROLOGY: CN 2-12 are intact and symmetric; no gross sensory deficits;   SKIN: No rashes; no palpable lesions    LABS:                        9.6    2.86  )-----------( 130      ( 29 Sep 2024 06:25 )             31.5     09-29    141  |  104  |  14.8  ----------------------------<  119[H]  4.0   |  28.0  |  0.61    Ca    8.3[L]      29 Sep 2024 06:25  Mg     2.0     09-29    TPro  5.2[L]  /  Alb  3.2[L]  /  TBili  0.8  /  DBili  x   /  AST  39[H]  /  ALT  19  /  AlkPhos  238[H]  09-28    PT/INR - ( 27 Sep 2024 20:55 )   PT: 13.8 sec;   INR: 1.19 ratio         PTT - ( 27 Sep 2024 20:55 )  PTT:39.7 sec      Urinalysis Basic - ( 29 Sep 2024 06:25 )    Color: x / Appearance: x / SG: x / pH: x  Gluc: 119 mg/dL / Ketone: x  / Bili: x / Urobili: x   Blood: x / Protein: x / Nitrite: x   Leuk Esterase: x / RBC: x / WBC x   Sq Epi: x / Non Sq Epi: x / Bacteria: x        Culture - Urine (collected 27 Sep 2024 23:28)  Source: Clean Catch Clean Catch (Midstream)  Final Report (28 Sep 2024 23:34):    <10,000 CFU/mL Normal Urogenital Stacie      CAPILLARY BLOOD GLUCOSE            RADIOLOGY & ADDITIONAL TESTS:  Results Reviewed:   Imaging Personally Reviewed:  Electrocardiogram Personally Reviewed:                                           Greta Lazar MD (Available on [employer])  [facility] Division of Hospital Medicine    Chief Complaint:  Facial numbness    SUBJECTIVE / OVERNIGHT EVENTS:  Pt seen and examined at bedside. Endorses feeling better. Still has some facial droop.    Patient denies chest pain, SOB, abd pain, N/V, fever, chills, dysuria or any other complaints. All remainder ROS negative.     INTERVAL EVENTS:  -Pt hemodynamically stable with no overnight events  -Neurology consult recs appreciated. EEG ordered. Suggests MRI brain w/contrast  -Heme/onc consulted. Dexamethasone ordered for cerebral edema   -MRI brain w/contrast done. No report yet  -Appreciate Neuro eval, EEG neg so far     MEDICATIONS  (STANDING):  dexAMETHasone     Tablet 4 milliGRAM(s) Oral every 6 hours  FLUoxetine 80 milliGRAM(s) Oral at bedtime  furosemide    Tablet 40 milliGRAM(s) Oral daily  memantine 10 milliGRAM(s) Oral every 12 hours  methadone    Tablet 15 milliGRAM(s) Oral <User Schedule>  methadone    Tablet 10 milliGRAM(s) Oral <User Schedule>  methylphenidate 20 milliGRAM(s) Oral two times a day  pantoprazole    Tablet 40 milliGRAM(s) Oral every 12 hours  pregabalin 200 milliGRAM(s) Oral three times a day    MEDICATIONS  (PRN):  albuterol    90 MICROgram(s) HFA Inhaler 2 Puff(s) Inhalation every 6 hours PRN Shortness of Breath and/or Wheezing  diazepam    Tablet 2 milliGRAM(s) Oral at bedtime PRN insomnia  HYDROmorphone  Injectable 2 milliGRAM(s) IV Push every 4 hours PRN Severe Pain (7 - 10)  HYDROmorphone  Injectable 1 milliGRAM(s) IV Push every 4 hours PRN Breakthrough Pain  polyethylene glycol 3350 17 Gram(s) Oral daily PRN Constipation  simethicone 80 milliGRAM(s) Chew four times a day PRN Gas        I&O's Summary    28 Sep 2024 07:01  -  29 Sep 2024 07:00  --------------------------------------------------------  IN: 420 mL / OUT: 0 mL / NET: 420 mL        PHYSICAL EXAM:  Vital Signs Last 24 Hrs  T(C): 36.8 (29 Sep 2024 07:41), Max: 36.8 (29 Sep 2024 07:41)  T(F): 98.3 (29 Sep 2024 07:41), Max: 98.3 (29 Sep 2024 07:41)  HR: 65 (29 Sep 2024 07:41) (64 - 85)  BP: 96/62 (29 Sep 2024 07:41) (90/60 - 100/64)  BP(mean): 76 (29 Sep 2024 06:48) (72 - 76)  RR: 18 (29 Sep 2024 07:41) (17 - 18)  SpO2: 95% (29 Sep 2024 07:41) (95% - 100%)    Parameters below as of 29 Sep 2024 07:41  Patient On (Oxygen Delivery Method): room air            CONSTITUTIONAL: NAD  ENMT: Moist oral mucosa  RESPIRATORY: Normal respiratory effort; lungs are clear to auscultation bilaterally  CARDIOVASCULAR: Regular rate and rhythm, normal S1 and S2, no murmur/rub/gallop  ABDOMEN: Nontender to palpation, normoactive bowel sounds, no rebound/guarding; No hepatosplenomegaly  EXTREMITIES: No LE swelling   PSYCH: A+O to person, place, and time; affect appropriate  NEUROLOGY: CN 2-12 are intact and symmetric; no gross sensory deficits;   SKIN: No rashes; no palpable lesions    LABS:                        9.6    2.86  )-----------( 130      ( 29 Sep 2024 06:25 )             31.5     09-29    141  |  104  |  14.8  ----------------------------<  119[H]  4.0   |  28.0  |  0.61    Ca    8.3[L]      29 Sep 2024 06:25  Mg     2.0     09-29    TPro  5.2[L]  /  Alb  3.2[L]  /  TBili  0.8  /  DBili  x   /  AST  39[H]  /  ALT  19  /  AlkPhos  238[H]  09-28    PT/INR - ( 27 Sep 2024 20:55 )   PT: 13.8 sec;   INR: 1.19 ratio         PTT - ( 27 Sep 2024 20:55 )  PTT:39.7 sec      Urinalysis Basic - ( 29 Sep 2024 06:25 )    Color: x / Appearance: x / SG: x / pH: x  Gluc: 119 mg/dL / Ketone: x  / Bili: x / Urobili: x   Blood: x / Protein: x / Nitrite: x   Leuk Esterase: x / RBC: x / WBC x   Sq Epi: x / Non Sq Epi: x / Bacteria: x        Culture - Urine (collected 27 Sep 2024 23:28)  Source: Clean Catch Clean Catch (Midstream)  Final Report (28 Sep 2024 23:34):    <10,000 CFU/mL Normal Urogenital Stacie      CAPILLARY BLOOD GLUCOSE            RADIOLOGY & ADDITIONAL TESTS:  Results Reviewed:   Imaging Personally Reviewed:  Electrocardiogram Personally Reviewed:

## 2024-09-29 NOTE — PROGRESS NOTE ADULT - ASSESSMENT
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

## 2024-09-29 NOTE — EEG REPORT - NS EEG TEXT BOX
[facility]-[id number]     Study Date: 09-28-24 21:02 to  09-29-24 08:00  Duration: 10 hr 6 min    --------------------------------------------------------------------------------------------------  History:  CC/ HPI Patient is a 38y old  Female who presents with a chief complaint of Left facial numbness (29 Sep 2024 11:29)    MEDICATIONS  (STANDING):  dexAMETHasone     Tablet 4 milliGRAM(s) Oral every 6 hours  FLUoxetine 80 milliGRAM(s) Oral at bedtime  furosemide    Tablet 40 milliGRAM(s) Oral daily  memantine 10 milliGRAM(s) Oral every 12 hours  methadone    Tablet 15 milliGRAM(s) Oral <User Schedule>  methadone    Tablet 10 milliGRAM(s) Oral <User Schedule>  methylphenidate 20 milliGRAM(s) Oral two times a day  pantoprazole    Tablet 40 milliGRAM(s) Oral every 12 hours  pregabalin 200 milliGRAM(s) Oral three times a day    --------------------------------------------------------------------------------------------------  Study Interpretation:    [[[Abbreviation Key:  PDR=alpha rhythm/posterior dominant rhythm. A-P=anterior posterior gradient.  Amplitude: ‘very low’:<20; ‘low’:20-50; ‘medium’:; ‘high’:>200uV.  Persistence for periodic/rhythmic patterns (% of epoch) ‘rare’:<1%; ‘occasional’:1-10%; ‘frequent’:10-50%; ‘abundant’:50-90%; ‘continuous’:>90%.  Persistence for sporadic discharges: ‘rare’:<1/hr; ‘occasional’:1/min-1/hr; ‘frequent’:>1/min; ‘abundant’:>1/10 sec.  GRDA=generalized rhythmic delta activity; FIRDA=frontal intermittent GRDA; LRDA=lateralized rhythmic delta activity; TIRDA=temporal intermittent rhythmic delta activity;  LPD=PLED=lateralized periodic discharges; GPD=generalized periodic discharges; BiPDs=BiPLEDs=bilateral independent periodic epileptiform discharges; SIRPID=stimulus induced rhythmic, periodic, or ictal appearing discharges; BIRDs=brief potentially ictal rhythmic discharges >4 Hz, lasting .5-10s; PFA=paroxysmal bursts of beta/gamma; LVFA=low voltage fast activity.  Modifiers: +F=with fast component; +S=with spike component; +R=with rhythmic component.  S-B=burst suppression pattern.  Max=maximal. N1-drowsy; N2-stage II sleep; N3-slow wave sleep. SSS/BETS=small sharp spikes/benign epileptiform transients of sleep. HV=hyperventilation; PS=photic stimulation]]]    FINDINGS:      Background:  Continuity: Continuous  Symmetry: Symmetric  PDR: 8,5 Hz activity, with amplitude to 40 uV, that attenuated to eye opening.    Reactivity: Present  Voltage: Normal (between 20-150uV)  Anterior Posterior Gradient: Present  Other background findings: None  Breach: Absent    Background Slowing:  Generalized slowing: None was present.  Focal slowing: None was present.    State Changes:   -Drowsiness noted with increased slowing, attenuation of fast activity, vertex transients.  -Present with N2 sleep transients with symmetric spindles and K-complexes.    Sporadic Epileptiform Discharges:    None    Rhythmic and Periodic Patterns (RPPs):  None     Electrographic and Electroclinical seizures:  None    Other Clinical Events:  None    Activation Procedures:   -Hyperventilation was performed and did not elicit any abnormalities.    -Photic stimulation was performed and did not elicit any abnormalities.       Artifacts:  Intermittent myogenic and movement artifacts were noted.    ECG:  The heart rate on single channel ECG was predominantly regular rhythm.    EEG Classification / Summary:  Normal EEG study    -----------------------------------------------------------------------------------------------------    Clinical Impression:  Normal EEG study in the awake / drowsy / asleep states.  There were no epileptiform abnormalities recorded.      This is a preliminary impression still pending attendings disposition.     -------------------------------------------------------------------------------------------------------  [facility] EEG Reading Room Ph#: [phone]  Epilepsy Answering Service after 5PM and before 8:30AM: Ph#: [phone]    DO Dirk Delacruz, [facility]	   [facility]-[id number]     Study Date: 09-28-24 21:02 to  09-29-24 08:00  Duration: 10 hr 6 min    --------------------------------------------------------------------------------------------------  History:  CC/ HPI Patient is a 38y old  Female who presents with a chief complaint of Left facial numbness (29 Sep 2024 11:29)    MEDICATIONS  (STANDING):  dexAMETHasone     Tablet 4 milliGRAM(s) Oral every 6 hours  FLUoxetine 80 milliGRAM(s) Oral at bedtime  furosemide    Tablet 40 milliGRAM(s) Oral daily  memantine 10 milliGRAM(s) Oral every 12 hours  methadone    Tablet 15 milliGRAM(s) Oral <User Schedule>  methadone    Tablet 10 milliGRAM(s) Oral <User Schedule>  methylphenidate 20 milliGRAM(s) Oral two times a day  pantoprazole    Tablet 40 milliGRAM(s) Oral every 12 hours  pregabalin 200 milliGRAM(s) Oral three times a day    --------------------------------------------------------------------------------------------------  Study Interpretation:    [[[Abbreviation Key:  PDR=alpha rhythm/posterior dominant rhythm. A-P=anterior posterior gradient.  Amplitude: ‘very low’:<20; ‘low’:20-50; ‘medium’:; ‘high’:>200uV.  Persistence for periodic/rhythmic patterns (% of epoch) ‘rare’:<1%; ‘occasional’:1-10%; ‘frequent’:10-50%; ‘abundant’:50-90%; ‘continuous’:>90%.  Persistence for sporadic discharges: ‘rare’:<1/hr; ‘occasional’:1/min-1/hr; ‘frequent’:>1/min; ‘abundant’:>1/10 sec.  GRDA=generalized rhythmic delta activity; FIRDA=frontal intermittent GRDA; LRDA=lateralized rhythmic delta activity; TIRDA=temporal intermittent rhythmic delta activity;  LPD=PLED=lateralized periodic discharges; GPD=generalized periodic discharges; BiPDs=BiPLEDs=bilateral independent periodic epileptiform discharges; SIRPID=stimulus induced rhythmic, periodic, or ictal appearing discharges; BIRDs=brief potentially ictal rhythmic discharges >4 Hz, lasting .5-10s; PFA=paroxysmal bursts of beta/gamma; LVFA=low voltage fast activity.  Modifiers: +F=with fast component; +S=with spike component; +R=with rhythmic component.  S-B=burst suppression pattern.  Max=maximal. N1-drowsy; N2-stage II sleep; N3-slow wave sleep. SSS/BETS=small sharp spikes/benign epileptiform transients of sleep. HV=hyperventilation; PS=photic stimulation]]]    FINDINGS:      Background:  Continuity: Continuous  Symmetry: Symmetric  PDR: 8,5 Hz activity, with amplitude to 40 uV, that attenuated to eye opening.    Reactivity: Present  Voltage: Normal (between 20-150uV)  Anterior Posterior Gradient: Present  Other background findings: None  Breach: Absent    Background Slowing:  Generalized slowing: None was present.  Focal slowing: None was present.    State Changes:   -Drowsiness noted with increased slowing, attenuation of fast activity, vertex transients.  -Present with N2 sleep transients with symmetric spindles and K-complexes.    Sporadic Epileptiform Discharges:    None    Rhythmic and Periodic Patterns (RPPs):  None     Electrographic and Electroclinical seizures:  None    Other Clinical Events:  None    Activation Procedures:   -Hyperventilation was performed and did not elicit any abnormalities.    -Photic stimulation was performed and did not elicit any abnormalities.       Artifacts:  Intermittent myogenic and movement artifacts were noted.    ECG:  The heart rate on single channel ECG was predominantly regular rhythm.    EEG Classification / Summary:  Normal EEG study    -----------------------------------------------------------------------------------------------------    Clinical Impression:  Normal EEG study in the awake / drowsy / asleep states.  There were no epileptiform abnormalities recorded.          -------------------------------------------------------------------------------------------------------  [facility] EEG Reading Room Ph#: [phone]  Epilepsy Answering Service after 5PM and before 8:30AM: Ph#: [phone]    Zhang Currie DO  Fellow, [facility]

## 2024-09-29 NOTE — CONSULT NOTE ADULT - SUBJECTIVE AND OBJECTIVE BOX
HPI: Patient is a 38y Female seen on consultation for the evaluation and management of metastatic breast cancer, ER/MD neg, Her-2 pos, originally diagnosed 10/21, treated with Taxane/Herceptin, Enhertu, now receiving treatment with Kadcyla, on hold because of anemia.  Has known bone, liver, brain mets; has had radiation  Admitted with headache, left fail numbness, mild LLE weakness.  Had HRI with contrast that showed extensive disease, with left parasagittal, fronto parietal, bilateral hemispheric lesions, bilateral leptomeningeal enhancement.  Currently c/o significant headache, despite Decadron and Dilaudid and Methadone.  Denies c/o SOB, chest pain.  Has nausea.  Denies numbness tingling dysuria or hematuria    Reports anemia; has been requriing PRBC tx    PAST MEDICAL & SURGICAL HISTORY:  H/O compression fracture of spine      Anxiety      Metastatic breast cancer      H/O pleural effusion      Pericardial effusion      S/P tonsillectomy      H/O chest tube placement  12/23/21      S/P pericardiocentesis  12/28/21          REVIEW OF SYSTEMS      General:Reports significant headaches, nausea, left sided numbness, tingling of face, droolling, mild dysarthria	    	        MEDICATIONS  (STANDING):  chlorhexidine 2% Cloths 1 Application(s) Topical daily  dexAMETHasone     Tablet 4 milliGRAM(s) Oral every 6 hours  FLUoxetine 80 milliGRAM(s) Oral at bedtime  furosemide    Tablet 40 milliGRAM(s) Oral daily  memantine 10 milliGRAM(s) Oral every 12 hours  methadone    Tablet 10 milliGRAM(s) Oral <User Schedule>  methadone    Tablet 15 milliGRAM(s) Oral <User Schedule>  methylphenidate 20 milliGRAM(s) Oral two times a day  octreotide  Injectable 100 MICROGram(s) SubCutaneous two times a day  pantoprazole    Tablet 40 milliGRAM(s) Oral every 12 hours  pregabalin 200 milliGRAM(s) Oral three times a day    MEDICATIONS  (PRN):  albuterol    90 MICROgram(s) HFA Inhaler 2 Puff(s) Inhalation every 6 hours PRN Shortness of Breath and/or Wheezing  diazepam    Tablet 2 milliGRAM(s) Oral at bedtime PRN insomnia  HYDROmorphone  Injectable 2 milliGRAM(s) IV Push every 3 hours PRN Severe Pain (7 - 10)  HYDROmorphone  Injectable 1 milliGRAM(s) IV Push every 4 hours PRN Breakthrough Pain  polyethylene glycol 3350 17 Gram(s) Oral daily PRN Constipation  simethicone 80 milliGRAM(s) Chew four times a day PRN Gas      Allergies    pertuzumab (Other (Severe))  Perjeta (Other (Severe))    Intolerances        SOCIAL HISTORY:          Occupation: RN    FAMILY HISTORY:  FH: CVA (cerebrovascular accident)        Vital Signs Last 24 Hrs  T(C): 36.7 (29 Sep 2024 16:05), Max: 36.8 (29 Sep 2024 07:41)  T(F): 98.1 (29 Sep 2024 16:05), Max: 98.3 (29 Sep 2024 07:41)  HR: 99 (29 Sep 2024 16:05) (64 - 99)  BP: 98/65 (29 Sep 2024 16:05) (90/60 - 100/64)  BP(mean): 76 (29 Sep 2024 16:05) (76 - 76)  RR: 20 (29 Sep 2024 16:05) (18 - 20)  SpO2: 94% (29 Sep 2024 16:05) (94% - 100%)    Parameters below as of 29 Sep 2024 16:05  Patient On (Oxygen Delivery Method): room air        PHYSICAL EXAM:      Constitutional:WD adequately nourished female, awake, alert, uncomfortable; having EEG    Eyes:anicteric        Neck:Supple      Respiratory:Clear    Cardiovascular:RRR normal S1S2    Gastrointestinal:soft, non tender, pos BS      Extremities:no edema      Neurological:Awake, alert, spon speech         LABS:                        9.6    2.86  )-----------( 130      ( 29 Sep 2024 06:25 )             31.5     09-29    141  |  104  |  14.8  ----------------------------<  119[H]  4.0   |  28.0  |  0.61    Ca    8.3[L]      29 Sep 2024 06:25  Mg     2.0     09-29    TPro  5.2[L]  /  Alb  3.2[L]  /  TBili  0.8  /  DBili  x   /  AST  39[H]  /  ALT  19  /  AlkPhos  238[H]  09-28    PT/INR - ( 27 Sep 2024 20:55 )   PT: 13.8 sec;   INR: 1.19 ratio         PTT - ( 27 Sep 2024 20:55 )  PTT:39.7 sec  Urinalysis Basic - ( 29 Sep 2024 06:25 )    Color: x / Appearance: x / SG: x / pH: x  Gluc: 119 mg/dL / Ketone: x  / Bili: x / Urobili: x   Blood: x / Protein: x / Nitrite: x   Leuk Esterase: x / RBC: x / WBC x   Sq Epi: x / Non Sq Epi: x / Bacteria: x        RADIOLOGY & ADDITIONAL STUDIES:

## 2024-09-29 NOTE — CONSULT NOTE ADULT - ASSESSMENT
Imp:  Widely met Breast cancer to liver, bone, brain,   Admitted with left sided facial numbness  MRI of brain with contrast shows sign disease.  will need to compare with outpt studies  I will discuss with outpt  RT; concern in re:  leptomengeal enhancement bilateral cerebellar  Followed by Neurology  Will increase Dilaudid dose pending pain management    Will follow

## 2024-09-29 NOTE — PROGRESS NOTE ADULT - ASSESSMENT
Assessment/Plan:  39 y/o female with h/o metastatic breast cancer , mets to brain came to the er for lt. facial  numbness and droop  admitted for stroke work up. MRI brain with new hemorrhagic mets.       #Lt facial numbness  #MRI brain with new hemorrhagic Metastasis  #Stroke r/o  CT scan studies for stroke no acute findings.   follow mri  neuro checks.   follow lipid panel and statin plan per findings, ct studies no acute findings for stroke.   recent echo from july 2024, EF 55-60 %  Holding Asa and lovenox  neuro consult   Heme/onc consulted     #Hx of Metastatic Breast cancer  -pt follows with Dr Chapa at [facility]  -Hemeonc consult placed   -Pain meds resumed as per home regimen  -Also on Dilaudid 2mg iv q4h PRN for severe pain  -Dilaudid 1mg IV q4h PRN for breakthrough  -Pain mx consult on monday    #Hypokalemia: Now resolved   -Monitor BMP    #Anxiety  #Insomnia  -continue pt's prozac  -Valium at bedtime     #Normocytic Anemia likely AOCD  -Today Hb 10.2, anemia in the setting of metastatic breast cancer. got 2 units of prbc 3 days ago for Hb 6.8.  -Check iron panel in AM. Normal Iron, elevated Ferritin hence AOCD    Dispo: medically acute. Assessment/Plan:  39 y/o female with h/o metastatic breast cancer , mets to brain came to the er for lt. facial  numbness and droop  admitted for stroke work up. MRI brain with new hemorrhagic mets.       #Lt facial numbness  #MRI brain with new hemorrhagic Metastasis  #Stroke r/o  CT scan studies for stroke no acute findings.   follow mri w/contrast  neuro checks.   follow lipid panel and statin plan per findings, ct studies no acute findings for stroke.   recent echo from july 2024, EF 55-60 %  Holding Asa and lovenox  neuro consult   Heme/onc consulted     #Hx of Metastatic Breast cancer  -pt follows with Dr Chapa at [facility]  -Hemeonc consult placed   -Pain meds resumed as per home regimen  -Also on Dilaudid 2mg iv q4h PRN for severe pain  -Dilaudid 1mg IV q4h PRN for breakthrough  -Pain mx consult on monday    #Hypokalemia: Now resolved   -Monitor BMP    #Anxiety  #Insomnia  -continue pt's prozac  -Valium at bedtime     #Normocytic Anemia likely AOCD  -Today Hb 10.2, anemia in the setting of metastatic breast cancer. got 2 units of prbc 3 days ago for Hb 6.8.  -Check iron panel in AM. Normal Iron, elevated Ferritin hence AOCD    Dispo: medically acute.

## 2024-09-29 NOTE — PROGRESS NOTE ADULT - SUBJECTIVE AND OBJECTIVE BOX
[facility]                                     [facility] [place]                                 Raul Florentino, & Naveed                                  [address]                                        [place], [place], [address]                                             [phone]    CC:  HPI:  The patient is a 38y Female who presented with left sided numbness and weakness that started yesterday.  Her father noticed that she was having intermittent facial weakness.  She has stage 4 breast cancer, mets to brain and bone.  She has multiple brain mets, is s/p both whole brain XRT and focussed gamma knife surgery. She has been off chemo due to anemia.  Neurology is asked to evaluate.    Interval history: feeling a bit better today, has trouble keeping liquids in mouth- dribbling out from left    Review of systems (neurology): Denies headache or dizziness. Denies weakness/numbness.  mild speech/language deficits. Denies diplopia/blurred vision.  Denies confusion    MEDICATIONS  (STANDING):  dexAMETHasone     Tablet 4 milliGRAM(s) Oral every 6 hours  FLUoxetine 80 milliGRAM(s) Oral at bedtime  furosemide    Tablet 40 milliGRAM(s) Oral daily  memantine 10 milliGRAM(s) Oral every 12 hours  methadone    Tablet 15 milliGRAM(s) Oral <User Schedule>  methadone    Tablet 10 milliGRAM(s) Oral <User Schedule>  methylphenidate 20 milliGRAM(s) Oral two times a day  pantoprazole    Tablet 40 milliGRAM(s) Oral every 12 hours  pregabalin 200 milliGRAM(s) Oral three times a day    MEDICATIONS  (PRN):  albuterol    90 MICROgram(s) HFA Inhaler 2 Puff(s) Inhalation every 6 hours PRN Shortness of Breath and/or Wheezing  diazepam    Tablet 2 milliGRAM(s) Oral at bedtime PRN insomnia  HYDROmorphone  Injectable 1 milliGRAM(s) IV Push every 4 hours PRN Breakthrough Pain  HYDROmorphone  Injectable 2 milliGRAM(s) IV Push every 4 hours PRN Severe Pain (7 - 10)  polyethylene glycol 3350 17 Gram(s) Oral daily PRN Constipation  simethicone 80 milliGRAM(s) Chew four times a day PRN Gas      Vital Signs Last 24 Hrs  T(C): 36.8 (29 Sep 2024 07:41), Max: 36.8 (29 Sep 2024 07:41)  T(F): 98.3 (29 Sep 2024 07:41), Max: 98.3 (29 Sep 2024 07:41)  HR: 65 (29 Sep 2024 07:41) (64 - 85)  BP: 96/62 (29 Sep 2024 07:41) (90/60 - 100/64)  BP(mean): 76 (29 Sep 2024 06:48) (72 - 76)  RR: 18 (29 Sep 2024 07:41) (17 - 18)  SpO2: 95% (29 Sep 2024 07:41) (95% - 100%)    Parameters below as of 29 Sep 2024 07:41  Patient On (Oxygen Delivery Method): room air      Detailed Neurologic Exam:    Mental status: The patient is awake and alert and has normal attention span.  The patient is fully oriented in 3 spheres. The patient is oriented to current events. The patient is able to name objects, follow commands, repeat sentences. improved dysarthria    Cranial nerves: Pupils equal and react symmetrically to light. There is no visual field deficit to confrontation. Extraocular motion is full with no nystagmus. There is no ptosis. Facial sensation is intact. Facial musculature is asymmetric, very slight left facial weakness. Palate elevates symmetrically. Tongue is midline.    Motor: There is normal bulk and tone.  There is no tremor.  Strength is 5/5 in the right arm and leg.   Strength is 5/5 in the left arm and 5/5 leg.    Sensation: left dory sensory loss has improved    Cerebellar: There is no dysmetria on finger to nose testing.    Gait : deferred    LABS:                           9.6    2.86  )-----------( 130      ( 29 Sep 2024 06:25 )             31.5     09-29    141  |  104  |  14.8  ----------------------------<  119[H]  4.0   |  28.0  |  0.61    Ca    8.3[L]      29 Sep 2024 06:25  Mg     2.0     09-29    TPro  5.2[L]  /  Alb  3.2[L]  /  TBili  0.8  /  DBili  x   /  AST  39[H]  /  ALT  19  /  AlkPhos  238[H]  09-28    LIVER FUNCTIONS - ( 28 Sep 2024 04:38 )  Alb: 3.2 g/dL / Pro: 5.2 g/dL / ALK PHOS: 238 U/L / ALT: 19 U/L / AST: 39 U/L / GGT: x           PT/INR - ( 27 Sep 2024 20:55 )   PT: 13.8 sec;   INR: 1.19 ratio         PTT - ( 27 Sep 2024 20:55 )  PTT:39.7 sec    RADIOLOGY & ADDITIONAL STUDIES (independently reviewed unless otherwise noted):  MR Head No Cont (09.28.24 @ 08:44)  IMPRESSION:  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  Patient shouldreturn for contrast-enhanced images.  Marked progression of edema in the right cerebellum extending into the   right brachium pontis and cranial nerves V root entry zone.

## 2024-09-30 ENCOUNTER — TRANSCRIPTION ENCOUNTER (OUTPATIENT)
Age: 39
End: 2024-09-30

## 2024-09-30 LAB
ANION GAP SERPL CALC-SCNC: 9 MMOL/L — SIGNIFICANT CHANGE UP (ref 5–17)
BUN SERPL-MCNC: 11.9 MG/DL — SIGNIFICANT CHANGE UP (ref 8–20)
CALCIUM SERPL-MCNC: 8.4 MG/DL — SIGNIFICANT CHANGE UP (ref 8.4–10.5)
CHLORIDE SERPL-SCNC: 102 MMOL/L — SIGNIFICANT CHANGE UP (ref 96–108)
CO2 SERPL-SCNC: 27 MMOL/L — SIGNIFICANT CHANGE UP (ref 22–29)
CREAT SERPL-MCNC: 0.48 MG/DL — LOW (ref 0.5–1.3)
EGFR: 125 ML/MIN/1.73M2 — SIGNIFICANT CHANGE UP
GLUCOSE SERPL-MCNC: 163 MG/DL — HIGH (ref 70–99)
HCT VFR BLD CALC: 33 % — LOW (ref 34.5–45)
HGB BLD-MCNC: 10.1 G/DL — LOW (ref 11.5–15.5)
MAGNESIUM SERPL-MCNC: 2 MG/DL — SIGNIFICANT CHANGE UP (ref 1.6–2.6)
MCHC RBC-ENTMCNC: 29.9 PG — SIGNIFICANT CHANGE UP (ref 27–34)
MCHC RBC-ENTMCNC: 30.6 GM/DL — LOW (ref 32–36)
MCV RBC AUTO: 97.6 FL — SIGNIFICANT CHANGE UP (ref 80–100)
PLATELET # BLD AUTO: 165 K/UL — SIGNIFICANT CHANGE UP (ref 150–400)
POTASSIUM SERPL-MCNC: 4 MMOL/L — SIGNIFICANT CHANGE UP (ref 3.5–5.3)
POTASSIUM SERPL-SCNC: 4 MMOL/L — SIGNIFICANT CHANGE UP (ref 3.5–5.3)
RBC # BLD: 3.38 M/UL — LOW (ref 3.8–5.2)
RBC # FLD: 17.1 % — HIGH (ref 10.3–14.5)
SODIUM SERPL-SCNC: 138 MMOL/L — SIGNIFICANT CHANGE UP (ref 135–145)
WBC # BLD: 4.52 K/UL — SIGNIFICANT CHANGE UP (ref 3.8–10.5)
WBC # FLD AUTO: 4.52 K/UL — SIGNIFICANT CHANGE UP (ref 3.8–10.5)

## 2024-09-30 PROCEDURE — 99232 SBSQ HOSP IP/OBS MODERATE 35: CPT

## 2024-09-30 PROCEDURE — 99233 SBSQ HOSP IP/OBS HIGH 50: CPT

## 2024-09-30 PROCEDURE — 99223 1ST HOSP IP/OBS HIGH 75: CPT | Mod: GC

## 2024-09-30 PROCEDURE — 95718 EEG PHYS/QHP 2-12 HR W/VEEG: CPT

## 2024-09-30 PROCEDURE — 99221 1ST HOSP IP/OBS SF/LOW 40: CPT

## 2024-09-30 RX ORDER — ONDANSETRON HCL/PF 4 MG/2 ML
4 VIAL (ML) INJECTION EVERY 6 HOURS
Refills: 0 | Status: DISCONTINUED | OUTPATIENT
Start: 2024-09-30 | End: 2024-10-04

## 2024-09-30 RX ORDER — HYDROMORPHONE HYDROCHLORIDE 1 MG/ML
30 INJECTION, SOLUTION INTRAMUSCULAR; INTRAVENOUS; SUBCUTANEOUS
Refills: 0 | Status: DISCONTINUED | OUTPATIENT
Start: 2024-09-30 | End: 2024-10-03

## 2024-09-30 RX ORDER — METHYLPHENIDATE HCL 54 MG
20 TABLET, EXTENDED RELEASE 24 HR ORAL
Refills: 0 | Status: DISCONTINUED | OUTPATIENT
Start: 2024-10-01 | End: 2024-10-04

## 2024-09-30 RX ORDER — NALOXONE HYDROCHLORIDE 0.4 MG/ML
0.1 INJECTION, SOLUTION INTRAMUSCULAR; INTRAVENOUS; SUBCUTANEOUS
Refills: 0 | Status: DISCONTINUED | OUTPATIENT
Start: 2024-09-30 | End: 2024-10-04

## 2024-09-30 RX ADMIN — FUROSEMIDE 40 MILLIGRAM(S): 10 INJECTION INTRAVENOUS at 06:19

## 2024-09-30 RX ADMIN — PREGABALIN 200 MILLIGRAM(S): 25 CAPSULE ORAL at 20:58

## 2024-09-30 RX ADMIN — HYDROMORPHONE HYDROCHLORIDE 30 MILLILITER(S): 1 INJECTION, SOLUTION INTRAMUSCULAR; INTRAVENOUS; SUBCUTANEOUS at 19:45

## 2024-09-30 RX ADMIN — Medication 4 MILLIGRAM(S): at 17:12

## 2024-09-30 RX ADMIN — PANTOPRAZOLE SODIUM 40 MILLIGRAM(S): 40 TABLET, DELAYED RELEASE ORAL at 17:11

## 2024-09-30 RX ADMIN — PANTOPRAZOLE SODIUM 40 MILLIGRAM(S): 40 TABLET, DELAYED RELEASE ORAL at 06:19

## 2024-09-30 RX ADMIN — HYDROMORPHONE HYDROCHLORIDE 2 MILLIGRAM(S): 1 INJECTION, SOLUTION INTRAMUSCULAR; INTRAVENOUS; SUBCUTANEOUS at 14:30

## 2024-09-30 RX ADMIN — HYDROMORPHONE HYDROCHLORIDE 2 MILLIGRAM(S): 1 INJECTION, SOLUTION INTRAMUSCULAR; INTRAVENOUS; SUBCUTANEOUS at 01:04

## 2024-09-30 RX ADMIN — MEMANTINE 10 MILLIGRAM(S): 10 TABLET ORAL at 17:11

## 2024-09-30 RX ADMIN — HYDROMORPHONE HYDROCHLORIDE 30 MILLILITER(S): 1 INJECTION, SOLUTION INTRAMUSCULAR; INTRAVENOUS; SUBCUTANEOUS at 16:05

## 2024-09-30 RX ADMIN — HYDROMORPHONE HYDROCHLORIDE 2 MILLIGRAM(S): 1 INJECTION, SOLUTION INTRAMUSCULAR; INTRAVENOUS; SUBCUTANEOUS at 11:23

## 2024-09-30 RX ADMIN — METHADONE HYDROCHLORIDE 10 MILLIGRAM(S): 10 TABLET ORAL at 06:22

## 2024-09-30 RX ADMIN — HYDROMORPHONE HYDROCHLORIDE 1 MILLIGRAM(S): 1 INJECTION, SOLUTION INTRAMUSCULAR; INTRAVENOUS; SUBCUTANEOUS at 06:54

## 2024-09-30 RX ADMIN — PREGABALIN 200 MILLIGRAM(S): 25 CAPSULE ORAL at 13:27

## 2024-09-30 RX ADMIN — HYDROMORPHONE HYDROCHLORIDE 2 MILLIGRAM(S): 1 INJECTION, SOLUTION INTRAMUSCULAR; INTRAVENOUS; SUBCUTANEOUS at 04:30

## 2024-09-30 RX ADMIN — HYDROMORPHONE HYDROCHLORIDE 2 MILLIGRAM(S): 1 INJECTION, SOLUTION INTRAMUSCULAR; INTRAVENOUS; SUBCUTANEOUS at 00:34

## 2024-09-30 RX ADMIN — OCTREOTIDE ACETATE 100 MICROGRAM(S): 50 INJECTION, SOLUTION INTRAVENOUS; SUBCUTANEOUS at 06:54

## 2024-09-30 RX ADMIN — Medication 4 MILLIGRAM(S): at 04:34

## 2024-09-30 RX ADMIN — HYDROMORPHONE HYDROCHLORIDE 2 MILLIGRAM(S): 1 INJECTION, SOLUTION INTRAMUSCULAR; INTRAVENOUS; SUBCUTANEOUS at 10:23

## 2024-09-30 RX ADMIN — METHADONE HYDROCHLORIDE 15 MILLIGRAM(S): 10 TABLET ORAL at 20:58

## 2024-09-30 RX ADMIN — HYDROMORPHONE HYDROCHLORIDE 2 MILLIGRAM(S): 1 INJECTION, SOLUTION INTRAMUSCULAR; INTRAVENOUS; SUBCUTANEOUS at 05:00

## 2024-09-30 RX ADMIN — HYDROMORPHONE HYDROCHLORIDE 2 MILLIGRAM(S): 1 INJECTION, SOLUTION INTRAMUSCULAR; INTRAVENOUS; SUBCUTANEOUS at 13:30

## 2024-09-30 RX ADMIN — Medication 4 MILLIGRAM(S): at 06:20

## 2024-09-30 RX ADMIN — HYDROMORPHONE HYDROCHLORIDE 1 MILLIGRAM(S): 1 INJECTION, SOLUTION INTRAMUSCULAR; INTRAVENOUS; SUBCUTANEOUS at 07:24

## 2024-09-30 RX ADMIN — PREGABALIN 200 MILLIGRAM(S): 25 CAPSULE ORAL at 06:19

## 2024-09-30 RX ADMIN — CHLORHEXIDINE GLUCONATE ORAL RINSE 1 APPLICATION(S): 1.2 SOLUTION DENTAL at 13:29

## 2024-09-30 RX ADMIN — Medication 4 MILLIGRAM(S): at 18:15

## 2024-09-30 RX ADMIN — METHADONE HYDROCHLORIDE 10 MILLIGRAM(S): 10 TABLET ORAL at 13:27

## 2024-09-30 RX ADMIN — Medication 20 MILLIGRAM(S): at 06:19

## 2024-09-30 RX ADMIN — Medication 80 MILLIGRAM(S): at 20:58

## 2024-09-30 RX ADMIN — OCTREOTIDE ACETATE 100 MICROGRAM(S): 50 INJECTION, SOLUTION INTRAVENOUS; SUBCUTANEOUS at 17:48

## 2024-09-30 RX ADMIN — HYDROMORPHONE HYDROCHLORIDE 1 MILLIGRAM(S): 1 INJECTION, SOLUTION INTRAMUSCULAR; INTRAVENOUS; SUBCUTANEOUS at 16:10

## 2024-09-30 RX ADMIN — Medication 4 MILLIGRAM(S): at 13:30

## 2024-09-30 RX ADMIN — HYDROMORPHONE HYDROCHLORIDE 1 MILLIGRAM(S): 1 INJECTION, SOLUTION INTRAMUSCULAR; INTRAVENOUS; SUBCUTANEOUS at 15:19

## 2024-09-30 RX ADMIN — MEMANTINE 10 MILLIGRAM(S): 10 TABLET ORAL at 06:21

## 2024-09-30 NOTE — CONSULT NOTE ADULT - SUBJECTIVE AND OBJECTIVE BOX
Patient is a 38y old  Female who presented with a chief complaint of Left facial numbness.    HPI:  37 y/o female with h/o metastatic breast cancer , mets to brain came to the er for lt. facial  numbness and droop which started around 6 pm on the day of ER visit, triage note at 20: 56 pm. code stroke called by ER. pt. stated that she felt mild LLE weakness as well. no other neuro deficits reported. no HA, no cp, no sob. no syncope or near syncope. still feels some facial numbness on left, less than before.  pt. reports that her last chemo was about 3-4 months ago , follows [facility]. plan to restart chemo was held as her Hb dropped to 6.8 and pt. was at our hospital 3 days ago for blood transfusion and got 2 units. (28 Sep 2024 01:06)      HISTORY OF PRESENT ILLNESS:   38yF PMH     PAST MEDICAL & SURGICAL HISTORY:  H/O compression fracture of spine      Anxiety      Metastatic breast cancer      H/O pleural effusion      Pericardial effusion      S/P tonsillectomy      H/O chest tube placement  12/23/21      S/P pericardiocentesis  12/28/21        FAMILY HISTORY:  FH: CVA (cerebrovascular accident)        SOCIAL HISTORY:  Tobacco Use:  EtOH use:   Substance:    Allergies    pertuzumab (Other (Severe))  Perjeta (Other (Severe))    Intolerances        REVIEW OF SYSTEMS  Negative except as noted in HPI  CONSTITUTIONAL: No fever, weight loss, or fatigue  EYES: No eye pain, visual disturbances, or discharge  ENMT:  No difficulty hearing, tinnitus, vertigo; No sinus or throat pain  NECK: No pain or stiffness  BREASTS: No pain, masses, or nipple discharge  RESPIRATORY: No cough, wheezing, chills or hemoptysis; No shortness of breath  CARDIOVASCULAR: No chest pain, palpitations, dizziness, or leg swelling  GASTROINTESTINAL: No abdominal or epigastric pain. No nausea, vomiting, or hematemesis; No diarrhea or constipation. No melena or hematochezia.  GENITOURINARY: No dysuria, frequency, hematuria, or incontinence  NEUROLOGICAL: No headaches, memory loss, loss of strength, numbness, or tremors  SKIN: No itching, burning, rashes, or lesions   LYMPH NODES: No enlarged glands  ENDOCRINE: No heat or cold intolerance; No hair loss  MUSCULOSKELETAL: No joint pain or swelling; No muscle, back, or extremity pain  PSYCHIATRIC: No depression, anxiety, mood swings, or difficulty sleeping  HEME/LYMPH: No easy bruising, or bleeding gums  ALLERY AND IMMUNOLOGIC: No hives or eczema    HOME MEDICATIONS:  Home Medications:  diazePAM 2 mg oral tablet: 1 tab(s) orally 2 times a day as needed for  muscle spasm, anxiety (04 Sep 2024 03:58)  Dilaudid 4 mg oral tablet: 1 tab(s) orally 3 times a day as needed for  moderate pain (04 Sep 2024 03:59)  FLUoxetine 40 mg oral capsule: 2 cap(s) orally once a day (at bedtime) (17 Aug 2024 01:42)  Lasix 40 mg oral tablet: 1 tab(s) orally once a day (28 Sep 2024 01:50)  methadone 10 mg oral tablet: 1 tab(s) orally 2 times a day (28 Sep 2024 01:46)  methadone 5 mg oral tablet: 3 tab(s) orally once a day (at bedtime) (04 Sep 2024 03:52)  Namenda 10 mg oral tablet: 1 tab(s) orally 2 times a day (28 Sep 2024 01:49)  octreotide 100 mcg/mL injectable solution: 100 microgram(s) 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 (17 Aug 2024 01:42)  pregabalin 200 mg oral capsule: 1 cap(s) orally 3 times a day (17 Aug 2024 01:42)  Ritalin 20 mg oral tablet: 1 tab(s) orally 2 times a day (28 Sep 2024 01:49)      MEDICATIONS:  Antibiotics:    Neuro:  diazepam    Tablet 2 milliGRAM(s) Oral at bedtime PRN  FLUoxetine 80 milliGRAM(s) Oral at bedtime  HYDROmorphone  Injectable 2 milliGRAM(s) IV Push every 3 hours PRN  HYDROmorphone  Injectable 1 milliGRAM(s) IV Push every 4 hours PRN  HYDROmorphone PCA (1 mG/mL) 30 milliLiter(s) PCA Continuous PCA Continuous  memantine 10 milliGRAM(s) Oral every 12 hours  methadone    Tablet 15 milliGRAM(s) Oral <User Schedule>  methadone    Tablet 10 milliGRAM(s) Oral <User Schedule>  methylphenidate 20 milliGRAM(s) Oral two times a day  ondansetron Injectable 4 milliGRAM(s) IV Push every 6 hours PRN  pregabalin 200 milliGRAM(s) Oral three times a day    Anticoagulation:    OTHER:  albuterol    90 MICROgram(s) HFA Inhaler 2 Puff(s) Inhalation every 6 hours PRN  chlorhexidine 2% Cloths 1 Application(s) Topical daily  dexAMETHasone     Tablet 4 milliGRAM(s) Oral every 6 hours  furosemide    Tablet 40 milliGRAM(s) Oral daily  naloxone Injectable 0.1 milliGRAM(s) IV Push every 3 minutes PRN  octreotide  Injectable 100 MICROGram(s) SubCutaneous two times a day  pantoprazole    Tablet 40 milliGRAM(s) Oral every 12 hours  polyethylene glycol 3350 17 Gram(s) Oral daily PRN  simethicone 80 milliGRAM(s) Chew four times a day PRN    IVF:      Vital Signs Last 24 Hrs  T(C): 36.7 (30 Sep 2024 08:27), Max: 36.7 (29 Sep 2024 16:05)  T(F): 98 (30 Sep 2024 08:27), Max: 98.1 (29 Sep 2024 16:05)  HR: 83 (30 Sep 2024 10:26) (73 - 99)  BP: 111/74 (30 Sep 2024 10:26) (98/65 - 111/74)  BP(mean): 87 (30 Sep 2024 10:26) (76 - 87)  RR: 20 (30 Sep 2024 10:26) (18 - 20)  SpO2: 96% (30 Sep 2024 10:26) (94% - 96%)    Parameters below as of 30 Sep 2024 10:26  Patient On (Oxygen Delivery Method): room air          PHYSICAL EXAM:  GENERAL: NAD, well-groomed, well-developed  HEAD:  Atraumatic, normocephalic  DRAINS:   WOUND: Dressing clean dry intact; well healed  SHUNT: easily compressible and refills  EYES: Conjunctiva and sclera clear; corneal reflex intact  ENMT: No tonsillar erythema, exudates, or enlargement; moist mucous membranes, good dentition, no lesions  NECK: Supple, no JVD, dormal thyroid  FARIDA COMA SCORE: E- V- M- =       E: 4= opens eyes spontaneously 3= to voice 2= to noxious 1= no opening       V: 5= oriented 4= confused 3= inappropriate words 2= incomprehensible sounds 1= nonverbal 1T= intubated       M: 6= follows commands 5= localizes 4= withdraws 3= flexor posturing 2= extensor posturing 1= no movement  MENTAL STATUS: AAO x3; Awake/Comatose; Opens eyes spontaneously/to voice/to light touch/to noxious stimuli; Appropriately conversant without aphasia/Nonverbal; following simple commands/mimicking/not following commands  CRANIAL NERVES: Visual acuity normal for age, visual fields full to confrontation, PERRL. EOMI without nystagmus. Facial sensation intact V1-3 distribution b/l. Face symmetric w/ normal eye closure and smile, tongue midline. Hearing grossly intact. Speech clear. Head turning and shoulder shrug intact.   REFLEXES: PERRL. Corneals intact b/l. Gag intact. Cough intact. Oculocephalic reflex intact (Doll's eye). Negative Bowen's b/l. Negative clonus b/l  MOTOR: strength 5/5 b/l upper and lower extremities  Uppers     Delt (C5/6)     Bicep (C5/6)     Wrist Extend (C6)     Tricep (C7)     HG (C8/T1)  R                     5/5                 5/5                         5/5                           5/5                   5/5  L                      5/5                 5/5                         5/5                           5/5                   5/5  Lowers      HF(L1/L2)     KE (L3)     DF (L4)     EHL (L5)     PF (S1)      R                     5/5              5/5           5/5           5/5            5/5  L                     5/5               5/5          5/5            5/5            5/5  SENSATION: grossly intact to light touch all extremities  COORDINATION: Gait intact; rapid alternating movements intact b/l upper extremities; no upper extremity dysmetria  MUSCLE STRETCH REFLEXES: DTRs 2+ intact and symmetric  PLANTAR: upgoing/downgoing/mute (Babinski)  CHEST/LUNG: Clear to auscultation bilaterally; no rales, rhonchi, wheezing, or rubs  HEART: +S1/+S2; Regular rate and rhythm; no murmurs, rubs, or gallops  ABDOMEN: Soft, nontender, nondistended; bowel sounds present all four quadrants  EXTREMITIES:  2+ peripheral pulses, no clubbing, cyanosis, or edema  LYMPH: No lymphadenopathy noted  SKIN: Warm, dry; no rashes or lesions    LABS:                        10.1   4.52  )-----------( 165      ( 30 Sep 2024 04:10 )             33.0     09-30    138  |  102  |  11.9  ----------------------------<  163[H]  4.0   |  27.0  |  0.48[L]    Ca    8.4      30 Sep 2024 04:10  Mg     2.0     09-30        Urinalysis Basic - ( 30 Sep 2024 04:10 )    Color: x / Appearance: x / SG: x / pH: x  Gluc: 163 mg/dL / Ketone: x  / Bili: x / Urobili: x   Blood: x / Protein: x / Nitrite: x   Leuk Esterase: x / RBC: x / WBC x   Sq Epi: x / Non Sq Epi: x / Bacteria: x        CULTURES:  Culture Results:   <10,000 CFU/mL Normal Urogenital Stacie (09-27 @ 23:28)      RADIOLOGY & ADDITIONAL STUDIES:      CAPRINI SCORE [CLOT]:  Patient has an estimated Caprini score of greater than 5.  However, the patient's unique clinical situation will be addressed in an individual manner to determine appropriate anticoagulation treatment, if any. Patient is a 38y old  Female who presented with a chief complaint of Left facial numbness.    HPI:  38F PMH metastatic breast ca to liver, lung, brain, bone presented with left facial numbness.     37 y/o female with h/o metastatic breast cancer , mets to brain came to the er for lt. facial  numbness and droop which started around 6 pm on the day of ER visit, triage note at 20: 56 pm. code stroke called by ER. pt. stated that she felt mild LLE weakness as well. no other neuro deficits reported. no HA, no cp, no sob. no syncope or near syncope. still feels some facial numbness on left, less than before.  pt. reports that her last chemo was about 3-4 months ago , follows [facility]. plan to restart chemo was held as her Hb dropped to 6.8 and pt. was at our hospital 3 days ago for blood transfusion and got 2 units. (28 Sep 2024 01:06)      HISTORY OF PRESENT ILLNESS:   38yF PMH     PAST MEDICAL & SURGICAL HISTORY:  H/O compression fracture of spine      Anxiety      Metastatic breast cancer      H/O pleural effusion      Pericardial effusion      S/P tonsillectomy      H/O chest tube placement  12/23/21      S/P pericardiocentesis  12/28/21        FAMILY HISTORY:  FH: CVA (cerebrovascular accident)        SOCIAL HISTORY:  Tobacco Use:  EtOH use:   Substance:    Allergies    pertuzumab (Other (Severe))  Perjeta (Other (Severe))    Intolerances        REVIEW OF SYSTEMS  Negative except as noted in HPI  CONSTITUTIONAL: No fever, weight loss, or fatigue  EYES: No eye pain, visual disturbances, or discharge  ENMT:  No difficulty hearing, tinnitus, vertigo; No sinus or throat pain  NECK: No pain or stiffness  BREASTS: No pain, masses, or nipple discharge  RESPIRATORY: No cough, wheezing, chills or hemoptysis; No shortness of breath  CARDIOVASCULAR: No chest pain, palpitations, dizziness, or leg swelling  GASTROINTESTINAL: No abdominal or epigastric pain. No nausea, vomiting, or hematemesis; No diarrhea or constipation. No melena or hematochezia.  GENITOURINARY: No dysuria, frequency, hematuria, or incontinence  NEUROLOGICAL: No headaches, memory loss, loss of strength, numbness, or tremors  SKIN: No itching, burning, rashes, or lesions   LYMPH NODES: No enlarged glands  ENDOCRINE: No heat or cold intolerance; No hair loss  MUSCULOSKELETAL: No joint pain or swelling; No muscle, back, or extremity pain  PSYCHIATRIC: No depression, anxiety, mood swings, or difficulty sleeping  HEME/LYMPH: No easy bruising, or bleeding gums  ALLERY AND IMMUNOLOGIC: No hives or eczema    HOME MEDICATIONS:  Home Medications:  diazePAM 2 mg oral tablet: 1 tab(s) orally 2 times a day as needed for  muscle spasm, anxiety (04 Sep 2024 03:58)  Dilaudid 4 mg oral tablet: 1 tab(s) orally 3 times a day as needed for  moderate pain (04 Sep 2024 03:59)  FLUoxetine 40 mg oral capsule: 2 cap(s) orally once a day (at bedtime) (17 Aug 2024 01:42)  Lasix 40 mg oral tablet: 1 tab(s) orally once a day (28 Sep 2024 01:50)  methadone 10 mg oral tablet: 1 tab(s) orally 2 times a day (28 Sep 2024 01:46)  methadone 5 mg oral tablet: 3 tab(s) orally once a day (at bedtime) (04 Sep 2024 03:52)  Namenda 10 mg oral tablet: 1 tab(s) orally 2 times a day (28 Sep 2024 01:49)  octreotide 100 mcg/mL injectable solution: 100 microgram(s) 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 (17 Aug 2024 01:42)  pregabalin 200 mg oral capsule: 1 cap(s) orally 3 times a day (17 Aug 2024 01:42)  Ritalin 20 mg oral tablet: 1 tab(s) orally 2 times a day (28 Sep 2024 01:49)      MEDICATIONS:  Antibiotics:    Neuro:  diazepam    Tablet 2 milliGRAM(s) Oral at bedtime PRN  FLUoxetine 80 milliGRAM(s) Oral at bedtime  HYDROmorphone  Injectable 2 milliGRAM(s) IV Push every 3 hours PRN  HYDROmorphone  Injectable 1 milliGRAM(s) IV Push every 4 hours PRN  HYDROmorphone PCA (1 mG/mL) 30 milliLiter(s) PCA Continuous PCA Continuous  memantine 10 milliGRAM(s) Oral every 12 hours  methadone    Tablet 15 milliGRAM(s) Oral <User Schedule>  methadone    Tablet 10 milliGRAM(s) Oral <User Schedule>  methylphenidate 20 milliGRAM(s) Oral two times a day  ondansetron Injectable 4 milliGRAM(s) IV Push every 6 hours PRN  pregabalin 200 milliGRAM(s) Oral three times a day    Anticoagulation:    OTHER:  albuterol    90 MICROgram(s) HFA Inhaler 2 Puff(s) Inhalation every 6 hours PRN  chlorhexidine 2% Cloths 1 Application(s) Topical daily  dexAMETHasone     Tablet 4 milliGRAM(s) Oral every 6 hours  furosemide    Tablet 40 milliGRAM(s) Oral daily  naloxone Injectable 0.1 milliGRAM(s) IV Push every 3 minutes PRN  octreotide  Injectable 100 MICROGram(s) SubCutaneous two times a day  pantoprazole    Tablet 40 milliGRAM(s) Oral every 12 hours  polyethylene glycol 3350 17 Gram(s) Oral daily PRN  simethicone 80 milliGRAM(s) Chew four times a day PRN    IVF:      Vital Signs Last 24 Hrs  T(C): 36.7 (30 Sep 2024 08:27), Max: 36.7 (29 Sep 2024 16:05)  T(F): 98 (30 Sep 2024 08:27), Max: 98.1 (29 Sep 2024 16:05)  HR: 83 (30 Sep 2024 10:26) (73 - 99)  BP: 111/74 (30 Sep 2024 10:26) (98/65 - 111/74)  BP(mean): 87 (30 Sep 2024 10:26) (76 - 87)  RR: 20 (30 Sep 2024 10:26) (18 - 20)  SpO2: 96% (30 Sep 2024 10:26) (94% - 96%)    Parameters below as of 30 Sep 2024 10:26  Patient On (Oxygen Delivery Method): room air          PHYSICAL EXAM:  GENERAL: NAD, well-groomed, well-developed  HEAD:  Atraumatic, normocephalic  DRAINS:   WOUND: Dressing clean dry intact; well healed  SHUNT: easily compressible and refills  EYES: Conjunctiva and sclera clear; corneal reflex intact  ENMT: No tonsillar erythema, exudates, or enlargement; moist mucous membranes, good dentition, no lesions  NECK: Supple, no JVD, dormal thyroid  FARIDA COMA SCORE: E- V- M- =       E: 4= opens eyes spontaneously 3= to voice 2= to noxious 1= no opening       V: 5= oriented 4= confused 3= inappropriate words 2= incomprehensible sounds 1= nonverbal 1T= intubated       M: 6= follows commands 5= localizes 4= withdraws 3= flexor posturing 2= extensor posturing 1= no movement  MENTAL STATUS: AAO x3; Awake/Comatose; Opens eyes spontaneously/to voice/to light touch/to noxious stimuli; Appropriately conversant without aphasia/Nonverbal; following simple commands/mimicking/not following commands  CRANIAL NERVES: Visual acuity normal for age, visual fields full to confrontation, PERRL. EOMI without nystagmus. Facial sensation intact V1-3 distribution b/l. Face symmetric w/ normal eye closure and smile, tongue midline. Hearing grossly intact. Speech clear. Head turning and shoulder shrug intact.   REFLEXES: PERRL. Corneals intact b/l. Gag intact. Cough intact. Oculocephalic reflex intact (Doll's eye). Negative Bowen's b/l. Negative clonus b/l  MOTOR: strength 5/5 b/l upper and lower extremities  Uppers     Delt (C5/6)     Bicep (C5/6)     Wrist Extend (C6)     Tricep (C7)     HG (C8/T1)  R                     5/5                 5/5                         5/5                           5/5                   5/5  L                      5/5                 5/5                         5/5                           5/5                   5/5  Lowers      HF(L1/L2)     KE (L3)     DF (L4)     EHL (L5)     PF (S1)      R                     5/5              5/5           5/5           5/5            5/5  L                     5/5               5/5          5/5            5/5            5/5  SENSATION: grossly intact to light touch all extremities  COORDINATION: Gait intact; rapid alternating movements intact b/l upper extremities; no upper extremity dysmetria  MUSCLE STRETCH REFLEXES: DTRs 2+ intact and symmetric  PLANTAR: upgoing/downgoing/mute (Babinski)  CHEST/LUNG: Clear to auscultation bilaterally; no rales, rhonchi, wheezing, or rubs  HEART: +S1/+S2; Regular rate and rhythm; no murmurs, rubs, or gallops  ABDOMEN: Soft, nontender, nondistended; bowel sounds present all four quadrants  EXTREMITIES:  2+ peripheral pulses, no clubbing, cyanosis, or edema  LYMPH: No lymphadenopathy noted  SKIN: Warm, dry; no rashes or lesions    LABS:                        10.1   4.52  )-----------( 165      ( 30 Sep 2024 04:10 )             33.0     09-30    138  |  102  |  11.9  ----------------------------<  163[H]  4.0   |  27.0  |  0.48[L]    Ca    8.4      30 Sep 2024 04:10  Mg     2.0     09-30        Urinalysis Basic - ( 30 Sep 2024 04:10 )    Color: x / Appearance: x / SG: x / pH: x  Gluc: 163 mg/dL / Ketone: x  / Bili: x / Urobili: x   Blood: x / Protein: x / Nitrite: x   Leuk Esterase: x / RBC: x / WBC x   Sq Epi: x / Non Sq Epi: x / Bacteria: x        CULTURES:  Culture Results:   <10,000 CFU/mL Normal Urogenital Stacie (09-27 @ 23:28)      RADIOLOGY & ADDITIONAL STUDIES:      CAPRINI SCORE [CLOT]:  Patient has an estimated Caprini score of greater than 5.  However, the patient's unique clinical situation will be addressed in an individual manner to determine appropriate anticoagulation treatment, if any. Patient is a 38y old  Female who presented with a chief complaint of Left facial numbness.    HPI:  38F PMH metastatic breast ca to liver, lung, brain, bone presented with left facial numbness. Pt is under the care of Dr. Chapa & is currently undergoing chemotherapy. She states that on     39 y/o female with h/o metastatic breast cancer , mets to brain came to the er for lt. facial  numbness and droop which started around 6 pm on the day of ER visit, triage note at 20: 56 pm. code stroke called by ER. pt. stated that she felt mild LLE weakness as well. no other neuro deficits reported. no HA, no cp, no sob. no syncope or near syncope. still feels some facial numbness on left, less than before.  pt. reports that her last chemo was about 3-4 months ago , follows [facility]. plan to restart chemo was held as her Hb dropped to 6.8 and pt. was at our hospital 3 days ago for blood transfusion and got 2 units. (28 Sep 2024 01:06)      HISTORY OF PRESENT ILLNESS:   38yF PMH     PAST MEDICAL & SURGICAL HISTORY:  H/O compression fracture of spine      Anxiety      Metastatic breast cancer      H/O pleural effusion      Pericardial effusion      S/P tonsillectomy      H/O chest tube placement  12/23/21      S/P pericardiocentesis  12/28/21        FAMILY HISTORY:  FH: CVA (cerebrovascular accident)        SOCIAL HISTORY:  Tobacco Use:  EtOH use:   Substance:    Allergies    pertuzumab (Other (Severe))  Perjeta (Other (Severe))    Intolerances        REVIEW OF SYSTEMS  Negative except as noted in HPI  CONSTITUTIONAL: No fever, weight loss, or fatigue  EYES: No eye pain, visual disturbances, or discharge  ENMT:  No difficulty hearing, tinnitus, vertigo; No sinus or throat pain  NECK: No pain or stiffness  BREASTS: No pain, masses, or nipple discharge  RESPIRATORY: No cough, wheezing, chills or hemoptysis; No shortness of breath  CARDIOVASCULAR: No chest pain, palpitations, dizziness, or leg swelling  GASTROINTESTINAL: No abdominal or epigastric pain. No nausea, vomiting, or hematemesis; No diarrhea or constipation. No melena or hematochezia.  GENITOURINARY: No dysuria, frequency, hematuria, or incontinence  NEUROLOGICAL: No headaches, memory loss, loss of strength, numbness, or tremors  SKIN: No itching, burning, rashes, or lesions   LYMPH NODES: No enlarged glands  ENDOCRINE: No heat or cold intolerance; No hair loss  MUSCULOSKELETAL: No joint pain or swelling; No muscle, back, or extremity pain  PSYCHIATRIC: No depression, anxiety, mood swings, or difficulty sleeping  HEME/LYMPH: No easy bruising, or bleeding gums  ALLERY AND IMMUNOLOGIC: No hives or eczema    HOME MEDICATIONS:  Home Medications:  diazePAM 2 mg oral tablet: 1 tab(s) orally 2 times a day as needed for  muscle spasm, anxiety (04 Sep 2024 03:58)  Dilaudid 4 mg oral tablet: 1 tab(s) orally 3 times a day as needed for  moderate pain (04 Sep 2024 03:59)  FLUoxetine 40 mg oral capsule: 2 cap(s) orally once a day (at bedtime) (17 Aug 2024 01:42)  Lasix 40 mg oral tablet: 1 tab(s) orally once a day (28 Sep 2024 01:50)  methadone 10 mg oral tablet: 1 tab(s) orally 2 times a day (28 Sep 2024 01:46)  methadone 5 mg oral tablet: 3 tab(s) orally once a day (at bedtime) (04 Sep 2024 03:52)  Namenda 10 mg oral tablet: 1 tab(s) orally 2 times a day (28 Sep 2024 01:49)  octreotide 100 mcg/mL injectable solution: 100 microgram(s) 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 (17 Aug 2024 01:42)  pregabalin 200 mg oral capsule: 1 cap(s) orally 3 times a day (17 Aug 2024 01:42)  Ritalin 20 mg oral tablet: 1 tab(s) orally 2 times a day (28 Sep 2024 01:49)      MEDICATIONS:  Antibiotics:    Neuro:  diazepam    Tablet 2 milliGRAM(s) Oral at bedtime PRN  FLUoxetine 80 milliGRAM(s) Oral at bedtime  HYDROmorphone  Injectable 2 milliGRAM(s) IV Push every 3 hours PRN  HYDROmorphone  Injectable 1 milliGRAM(s) IV Push every 4 hours PRN  HYDROmorphone PCA (1 mG/mL) 30 milliLiter(s) PCA Continuous PCA Continuous  memantine 10 milliGRAM(s) Oral every 12 hours  methadone    Tablet 15 milliGRAM(s) Oral <User Schedule>  methadone    Tablet 10 milliGRAM(s) Oral <User Schedule>  methylphenidate 20 milliGRAM(s) Oral two times a day  ondansetron Injectable 4 milliGRAM(s) IV Push every 6 hours PRN  pregabalin 200 milliGRAM(s) Oral three times a day    Anticoagulation:    OTHER:  albuterol    90 MICROgram(s) HFA Inhaler 2 Puff(s) Inhalation every 6 hours PRN  chlorhexidine 2% Cloths 1 Application(s) Topical daily  dexAMETHasone     Tablet 4 milliGRAM(s) Oral every 6 hours  furosemide    Tablet 40 milliGRAM(s) Oral daily  naloxone Injectable 0.1 milliGRAM(s) IV Push every 3 minutes PRN  octreotide  Injectable 100 MICROGram(s) SubCutaneous two times a day  pantoprazole    Tablet 40 milliGRAM(s) Oral every 12 hours  polyethylene glycol 3350 17 Gram(s) Oral daily PRN  simethicone 80 milliGRAM(s) Chew four times a day PRN    IVF:      Vital Signs Last 24 Hrs  T(C): 36.7 (30 Sep 2024 08:27), Max: 36.7 (29 Sep 2024 16:05)  T(F): 98 (30 Sep 2024 08:27), Max: 98.1 (29 Sep 2024 16:05)  HR: 83 (30 Sep 2024 10:26) (73 - 99)  BP: 111/74 (30 Sep 2024 10:26) (98/65 - 111/74)  BP(mean): 87 (30 Sep 2024 10:26) (76 - 87)  RR: 20 (30 Sep 2024 10:26) (18 - 20)  SpO2: 96% (30 Sep 2024 10:26) (94% - 96%)    Parameters below as of 30 Sep 2024 10:26  Patient On (Oxygen Delivery Method): room air          PHYSICAL EXAM:  GENERAL: NAD, well-groomed, well-developed  HEAD:  Atraumatic, normocephalic  DRAINS:   WOUND: Dressing clean dry intact; well healed  SHUNT: easily compressible and refills  EYES: Conjunctiva and sclera clear; corneal reflex intact  ENMT: No tonsillar erythema, exudates, or enlargement; moist mucous membranes, good dentition, no lesions  NECK: Supple, no JVD, dormal thyroid  FARIDA COMA SCORE: E- V- M- =       E: 4= opens eyes spontaneously 3= to voice 2= to noxious 1= no opening       V: 5= oriented 4= confused 3= inappropriate words 2= incomprehensible sounds 1= nonverbal 1T= intubated       M: 6= follows commands 5= localizes 4= withdraws 3= flexor posturing 2= extensor posturing 1= no movement  MENTAL STATUS: AAO x3; Awake/Comatose; Opens eyes spontaneously/to voice/to light touch/to noxious stimuli; Appropriately conversant without aphasia/Nonverbal; following simple commands/mimicking/not following commands  CRANIAL NERVES: Visual acuity normal for age, visual fields full to confrontation, PERRL. EOMI without nystagmus. Facial sensation intact V1-3 distribution b/l. Face symmetric w/ normal eye closure and smile, tongue midline. Hearing grossly intact. Speech clear. Head turning and shoulder shrug intact.   REFLEXES: PERRL. Corneals intact b/l. Gag intact. Cough intact. Oculocephalic reflex intact (Doll's eye). Negative Bowen's b/l. Negative clonus b/l  MOTOR: strength 5/5 b/l upper and lower extremities  Uppers     Delt (C5/6)     Bicep (C5/6)     Wrist Extend (C6)     Tricep (C7)     HG (C8/T1)  R                     5/5                 5/5                         5/5                           5/5                   5/5  L                      5/5                 5/5                         5/5                           5/5                   5/5  Lowers      HF(L1/L2)     KE (L3)     DF (L4)     EHL (L5)     PF (S1)      R                     5/5              5/5           5/5           5/5            5/5  L                     5/5               5/5          5/5            5/5            5/5  SENSATION: grossly intact to light touch all extremities  COORDINATION: Gait intact; rapid alternating movements intact b/l upper extremities; no upper extremity dysmetria  MUSCLE STRETCH REFLEXES: DTRs 2+ intact and symmetric  PLANTAR: upgoing/downgoing/mute (Babinski)  CHEST/LUNG: Clear to auscultation bilaterally; no rales, rhonchi, wheezing, or rubs  HEART: +S1/+S2; Regular rate and rhythm; no murmurs, rubs, or gallops  ABDOMEN: Soft, nontender, nondistended; bowel sounds present all four quadrants  EXTREMITIES:  2+ peripheral pulses, no clubbing, cyanosis, or edema  LYMPH: No lymphadenopathy noted  SKIN: Warm, dry; no rashes or lesions    LABS:                        10.1   4.52  )-----------( 165      ( 30 Sep 2024 04:10 )             33.0     09-30    138  |  102  |  11.9  ----------------------------<  163[H]  4.0   |  27.0  |  0.48[L]    Ca    8.4      30 Sep 2024 04:10  Mg     2.0     09-30        Urinalysis Basic - ( 30 Sep 2024 04:10 )    Color: x / Appearance: x / SG: x / pH: x  Gluc: 163 mg/dL / Ketone: x  / Bili: x / Urobili: x   Blood: x / Protein: x / Nitrite: x   Leuk Esterase: x / RBC: x / WBC x   Sq Epi: x / Non Sq Epi: x / Bacteria: x        CULTURES:  Culture Results:   <10,000 CFU/mL Normal Urogenital Stacie (09-27 @ 23:28)      RADIOLOGY & ADDITIONAL STUDIES:      CAPRINI SCORE [CLOT]:  Patient has an estimated Caprini score of greater than 5.  However, the patient's unique clinical situation will be addressed in an individual manner to determine appropriate anticoagulation treatment, if any. Patient is a 38y old  Female who presented with a chief complaint of Left facial numbness.    HPI:  38F PMH metastatic breast ca to liver, lung, brain, bone presented with left facial numbness. Pt is under the care of Dr. Chapa & was undergoing chemotherapy which was ultimately stopped d/t a drop in her hemoglobin. She states that on Friday she developed L facial numbness, L sided weakness, dysarthria, & word finding difficulty. She also states that she's had HAs & diplopia for a few months. She feels that her speech is slurred & "short."        PAST MEDICAL & SURGICAL HISTORY:  H/O compression fracture of spine      Anxiety      Metastatic breast cancer      H/O pleural effusion      Pericardial effusion      S/P tonsillectomy      H/O chest tube placement  12/23/21      S/P pericardiocentesis  12/28/21        FAMILY HISTORY:  FH: CVA (cerebrovascular accident)    Allergies    pertuzumab (Other (Severe))  Perjeta (Other (Severe))    Intolerances    Vital Signs Last 24 Hrs  T(C): 36.7 (30 Sep 2024 08:27), Max: 36.7 (29 Sep 2024 16:05)  T(F): 98 (30 Sep 2024 08:27), Max: 98.1 (29 Sep 2024 16:05)  HR: 83 (30 Sep 2024 10:26) (73 - 99)  BP: 111/74 (30 Sep 2024 10:26) (98/65 - 111/74)  BP(mean): 87 (30 Sep 2024 10:26) (76 - 87)  RR: 20 (30 Sep 2024 10:26) (18 - 20)  SpO2: 96% (30 Sep 2024 10:26) (94% - 96%)    Parameters below as of 30 Sep 2024 10:26  Patient On (Oxygen Delivery Method): room air      PHYSICAL EXAM:  GENERAL: NAD  HEAD: vEEG leads in place, normocephalic  EYES: Conjunctiva and sclera clear; corneal reflex intact  ENMT: Moist mucous membranes  NECK: Supple, no JVD  MENTAL STATUS: AAO x3; Opens eyes spontaneously; Dysarthric without aphasia; following simple commands  CRANIAL NERVES: PERRL. EOMI without nystagmus. Decreased L facial sensation. Mild L facial droop. Hearing grossly intact. Speech clear. Head turning and shoulder shrug intact.   MOTOR: RUE 5/5; RLE 5/5; LUE 5-/5; LLE 4+/5  SENSATION: Decreased sensation in LUE/LLE  COORDINATION: No upper extremity dysmetria      LABS:                        10.1   4.52  )-----------( 165      ( 30 Sep 2024 04:10 )             33.0     09-30    138  |  102  |  11.9  ----------------------------<  163[H]  4.0   |  27.0  |  0.48[L]    Ca    8.4      30 Sep 2024 04:10  Mg     2.0     09-30        Urinalysis Basic - ( 30 Sep 2024 04:10 )    Color: x / Appearance: x / SG: x / pH: x  Gluc: 163 mg/dL / Ketone: x  / Bili: x / Urobili: x   Blood: x / Protein: x / Nitrite: x   Leuk Esterase: x / RBC: x / WBC x   Sq Epi: x / Non Sq Epi: x / Bacteria: x        CULTURES:  Culture Results:   <10,000 CFU/mL Normal Urogenital Stacie (09-27 @ 23:28)      RADIOLOGY & ADDITIONAL STUDIES:  MR Head w/wo IV Cont 09.28.24:  IMPRESSION:    Left parasagittal frontoparietal and bilateral cerebellar hemisphere   metastatic parenchymal lesions as detailed in the body the report.    Additional abnormal leptomeningeal enhancement in the right and possibly   left cerebellar hemispheres.    --- End of Report ---

## 2024-09-30 NOTE — SPEECH LANGUAGE PATHOLOGY EVALUATION - COMMENTS
As per MD note: "Patient is a 38y Female seen on consultation for the evaluation and management of metastatic breast cancer, ER/TX neg, Her-2 pos, originally diagnosed 10/21, treated with Taxane/Herceptin, Enhertu, now receiving treatment with Kadcyla, on hold because of anemia.  Has known bone, liver, brain mets; has had radiation  Admitted with headache, left fail numbness, mild LLE weakness.  Had HRI with contrast that showed extensive disease, with left parasagittal, fronto parietal, bilateral hemispheric lesions, bilateral leptomeningeal enhancement.  Now with Widely met Breast cancer to liver, bone, brain" Speech tx services RX following discharge: case management aware Mildly reduced speech intelligibility in conversation/running speech with varied articulatory imprecision & prosody (pt with good awareness). Pt educated re: tasks to practice

## 2024-09-30 NOTE — CONSULT NOTE ADULT - CONSULT REASON
39 y/o woman with metastatic breast cancer s/p multiple cycles of chemotherapy and courses of palliative radiotherapy.  Now with new neurological deficits for evaluation and consideration of further palliative radiotherapy.

## 2024-09-30 NOTE — CONSULT NOTE ADULT - SUBJECTIVE AND OBJECTIVE BOX
39 y/o female with h/o metastatic breast cancer , mets to brain came to the er for lt. facial  numbness and droop which started around 6 pm on the day of ER visit (9/27/24)  triage note at 20: 56 pm. code stroke called by ER. pt. stated that she felt mild LLE weakness as well. no other neuro deficits reported. has occasional sharp sudden short-lived HA "10/10" lasting 1-2 minutes., No cp, no sob. no syncope or near syncope. still feels some facial numbness on left, less than before.  pt. reports that her last chemo was about 3-4 months ago , follows with [facility]. plan to restart chemo was held as her Hb dropped to 6.8 and pt. was at our hospital 3 days ago for blood transfusion and got 2 units. Brain scans upon admission including MRI with contrast reveal new metastases and areas of hemorrhage and areas suspicious for leptomeningeal spread.    REVIEW OF SYSTEMS:  Other Review of Systems: All other review of systems negative, except as noted in HPI      ALLERGIES AND INTOLERANCES:       Allergies:  	Perjeta: Drug, Other (Severe), Pulmonary Edema  	pertuzumab: Drug, Other (Severe), Pulmonary edema    HOME MEDICATIONS:  * Patient Currently Takes Medications as of 28-Sep-2024 01:46 documented in Structured Notes  · 	Dilaudid 8 mg oral tablet: 1 tab(s) orally 4 times a day as needed for  severe pain MDD: 6  · 	Protonix 40 mg oral delayed release tablet: 1 tab(s) orally 2 times a day  · 	octreotide 100 mcg/mL injectable solution: 100 microgram(s) 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  · 	diazePAM 2 mg oral tablet: 1 tab(s) orally 2 times a day as needed for  muscle spasm, anxiety  · 	methadone 5 mg oral tablet: 3 tab(s) orally once a day (at bedtime)  · 	methadone 10 mg oral tablet: Last Dose Taken:  , 1 tab(s) orally 2 times a day  · 	Namenda 10 mg oral tablet: Last Dose Taken:  , 1 tab(s) orally 2 times a day  · 	Ritalin 20 mg oral tablet: Last Dose Taken:  , 1 tab(s) orally 2 times a day  Dilaudid 4 mg oral tablet: 1 tab(s) orally 3 times a day as needed for  moderate pain  · 	pregabalin 200 mg oral capsule: 1 cap(s) orally 3 times a day  · 	Lasix 40 mg oral tablet: Last Dose Taken:  , 1 tab(s) orally once a day  · 	FLUoxetine 40 mg oral capsule: 2 cap(s) orally once a day (at bedtime)    .    PATIENT MEDICAL HISTORY:   Past Medical, Past Surgical, and Family History:  PAST MEDICAL HISTORY:  Anxiety     H/O compression fracture of spine     H/O pleural effusion     Metastatic breast cancer     Pericardial effusion.     PAST SURGICAL HISTORY:  H/O chest tube placement 12/23/21    S/P pericardiocentesis 12/28/21    S/P tonsillectomy.          FAMILY HISTORY:  FH: CVA (cerebrovascular accident).    SOCIAL HISTORY:  · Substance use	No    PHYSICAL EXAM/VITAL SIGNS LAST 24 HOURS:  T(C): 37 (27 Sep 2024 20:53), Max: 37 (27 Sep 2024 20:53)  T(F): 98.6 (27 Sep 2024 20:53), Max: 98.6 (27 Sep 2024 20:53)  HR: 65 (28 Sep 2024 01:20) (61 - 66)  BP: 115/65 (28 Sep 2024 01:20) (102/69 - 115/65)  BP(mean): --  RR: 18 (28 Sep 2024 01:20) (18 - 20)  SpO2: 98% (28 Sep 2024 01:20) (98% - 100%)    Parameters below as of 28 Sep 2024 01:20  Patient On (Oxygen Delivery Method): room air    General: pt. in bed not in distress  HEENT: AT, NC. PERRL. intact EOM.  no throat erythema or exudate.   Neck: supple. no JVD.   Chest: air entry fair bilaterally, no w /r/r.  Heart:  S1,S2. RRR. no heart murmur. no edema.   Abdomen: soft. non-tender. non-distended. + BS.  Ext: no calf tenderness. moves all ext. without any restrictions.   vascular : DP 2 + B/L   Ln : no palpable Ln.  Neuro: AAO x3. CN II-XII intact bilaterally . follows commands appropriately. motor 5/5 R/L UE RLE, 4+/5 L LE , mild subjective lt. facial numbness, cns otherwise appear intact.  Skin: warm and dry  psych : mood ok, no si/hi    LABS AND IMAGING STUDIES:    ACC: [id number] EXAM: MR BRAIN WAW IC ORDERED BY: MARILEE HERNANDEZ    PROCEDURE DATE: 09/28/2024        INTERPRETATION: Contrast-enhanced MRI of the brain.    CLINICAL INDICATION: Hemorrhagic metastases, breast carcinoma,    TECHNIQUE: Axial T1 MPRAGE images of the brain were obtained after the intravenous administration of 7 cc of Gadavist. 0.5 cc discarded. Sagittal and coronal reformats were provided    COMPARISON: CT brain 9/27/2024. Noncontrast MRI brain 9/20/2024. MRI brain 10/26/2023.    FINDINGS:    Redemonstration of 7 x 8 mm (AP by TV) T1 hyperintense lesion in the left parasagittal frontoparietal region. The lesion demonstrated T1 shortening on the noncontrast brain MRI, therefore evaluation for underlying enhancement is limited.    Multiple foci of abnormal enhancement along the right lateral superior cerebellar hemisphere measuring up to 10 x 10 x 7 mm (maximal AP x TV x CC dimensions). The lesions approximate the inferior surface of the right tentorial leaf, which may be involved.    8 x 8 x 8 mm focus of enhancement in the right superior cerebellar hemisphere, bordering the right brachium pontis.    Multifocal leptomeningeal enhancement in the right superior cerebellar hemisphere.    A few 4 mm somewhat linear foci of enhancement in the left cerebellar hemisphere may be parenchymal or leptomeningeal in nature.    No abnormal dural enhancement.    IMPRESSION:    Left parasagittal frontoparietal and bilateral cerebellar hemisphere metastatic parenchymal lesions as detailed in the body the report.    Additional abnormal leptomeningeal enhancement in the right and possibly left cerebellar hemispheres.    --- End of Report ---    CAMI FAULKNER MD; Attending Radiologist  This document has been electronically signed. Sep 29 2024 1:02PM

## 2024-09-30 NOTE — PROGRESS NOTE ADULT - ASSESSMENT
Imp:  Widely met Breast cancer to liver, bone, brain,   Admitted with left sided facial numbness  MRI of brain with contrast shows sign disease.  will need to compare with outpt studies  I will discuss with outpt  RT; concern in re:  leptomeningeal enhancement bilateral cerebellar  Followed by Neurology  Will increase Dilaudid dose pending pain management      Case discussed with RT-will evaluate and touch base with outpt RT  Neurosurg consult requested for possible LP; candidate for IT MTX if se has leptomeningeal disease    Will follow

## 2024-09-30 NOTE — CONSULT NOTE ADULT - ASSESSMENT
37 y/o female with h/o metastatic breast cancer , mets to brain presents to [facility] ER for new onset facial  numbness and droop admitted for stroke work up. MRI brain shows new hemorrhagic mets. Pain management consulted for pain management.   39 y/o female with h/o metastatic breast cancer , mets to brain presents to [facility] ER for new onset facial  numbness and droop admitted for stroke work up. MRI brain shows new hemorrhagic mets. Pain management consulted for pain management.      dPCA 0/0.3/8/6    cont home methadone 10/10//15        when due for discharge:  DC PCA  - Recommend starting dilaudid PO 4mg/8mg [id number] PRN mod/severe pain

## 2024-09-30 NOTE — CONSULT NOTE ADULT - SUBJECTIVE AND OBJECTIVE BOX
Interval Hx:  Patient seen during rounds  Patient reports pain to be controlled on current medications  Patient denies sedation with medications        Analgesic Dosing for past 24 hours reviewed as below:    ALPRAZolam   1 milliGRAM(s) Oral (09-29-24 @ 14:21)    FLUoxetine   80 milliGRAM(s) Oral (09-29-24 @ 21:39)    HYDROmorphone  Injectable   2 milliGRAM(s) IV Push (09-29-24 @ 18:31)   2 milliGRAM(s) IV Push (09-29-24 @ 14:24)    HYDROmorphone  Injectable   1 milliGRAM(s) IV Push (09-30-24 @ 06:54)   1 milliGRAM(s) IV Push (09-29-24 @ 21:37)   1 milliGRAM(s) IV Push (09-29-24 @ 15:53)   1 milliGRAM(s) IV Push (09-29-24 @ 11:03)    HYDROmorphone  Injectable   2 milliGRAM(s) IV Push (09-30-24 @ 04:30)   2 milliGRAM(s) IV Push (09-30-24 @ 00:34)    HYDROmorphone  Injectable   0.5 milliGRAM(s) IV Push (09-29-24 @ 17:47)    memantine   10 milliGRAM(s) Oral (09-30-24 @ 06:21)   10 milliGRAM(s) Oral (09-29-24 @ 17:47)    methadone    Tablet   15 milliGRAM(s) Oral (09-29-24 @ 21:35)    methadone    Tablet   10 milliGRAM(s) Oral (09-30-24 @ 06:22)   10 milliGRAM(s) Oral (09-29-24 @ 13:56)    methylphenidate   20 milliGRAM(s) Oral (09-30-24 @ 06:19)    pregabalin   200 milliGRAM(s) Oral (09-30-24 @ 06:19)   200 milliGRAM(s) Oral (09-29-24 @ 21:36)   200 milliGRAM(s) Oral (09-29-24 @ 13:56)          T(C): 36.7 (09-30-24 @ 08:27), Max: 36.7 (09-29-24 @ 16:05)  HR: 98 (09-30-24 @ 08:27) (73 - 99)  BP: 102/67 (09-30-24 @ 08:27) (98/65 - 102/67)  RR: 18 (09-30-24 @ 08:27) (18 - 20)  SpO2: 96% (09-30-24 @ 08:27) (94% - 96%)      09-29-24 @ 07:01  -  09-30-24 @ 07:00  --------------------------------------------------------  IN: 600 mL / OUT: 0 mL / NET: 600 mL        albuterol    90 MICROgram(s) HFA Inhaler 2 Puff(s) Inhalation every 6 hours PRN  chlorhexidine 2% Cloths 1 Application(s) Topical daily  dexAMETHasone     Tablet 4 milliGRAM(s) Oral every 6 hours  diazepam    Tablet 2 milliGRAM(s) Oral at bedtime PRN  FLUoxetine 80 milliGRAM(s) Oral at bedtime  furosemide    Tablet 40 milliGRAM(s) Oral daily  HYDROmorphone  Injectable 2 milliGRAM(s) IV Push every 3 hours PRN  HYDROmorphone  Injectable 1 milliGRAM(s) IV Push every 4 hours PRN  memantine 10 milliGRAM(s) Oral every 12 hours  methadone    Tablet 15 milliGRAM(s) Oral <User Schedule>  methadone    Tablet 10 milliGRAM(s) Oral <User Schedule>  methylphenidate 20 milliGRAM(s) Oral two times a day  octreotide  Injectable 100 MICROGram(s) SubCutaneous two times a day  pantoprazole    Tablet 40 milliGRAM(s) Oral every 12 hours  polyethylene glycol 3350 17 Gram(s) Oral daily PRN  pregabalin 200 milliGRAM(s) Oral three times a day  simethicone 80 milliGRAM(s) Chew four times a day PRN                          10.1   4.52  )-----------( 165      ( 30 Sep 2024 04:10 )             33.0     09-30    138  |  102  |  11.9  ----------------------------<  163[H]  4.0   |  27.0  |  0.48[L]    Ca    8.4      30 Sep 2024 04:10  Mg     2.0     09-30        Urinalysis Basic - ( 30 Sep 2024 04:10 )    Color: x / Appearance: x / SG: x / pH: x  Gluc: 163 mg/dL / Ketone: x  / Bili: x / Urobili: x   Blood: x / Protein: x / Nitrite: x   Leuk Esterase: x / RBC: x / WBC x   Sq Epi: x / Non Sq Epi: x / Bacteria: x        Pain Service   [phone] Interval Hx:   39 y/o female with h/o metastatic breast cancer , mets to brain presents to [facility] ER for new onset facial  numbness and droop admitted for stroke work up. MRI brain shows new hemorrhagic mets. Pain management consulted for pain management.    Patient seen during rounds. States prior relief with PCA during past admissions, requests PCA during this admission  Patient reports pain to be uncontrolled on current medications.  Patient denies sedation with medications    Gen: No acute distress  HENT: Atraumatic, normocephalic, wearing cEEG monitor  Eyes: conjunctivae are clear, non-icteric sclera  NEURO: A&Ox3, L facial droop, moving all extremities spontaneously  Resp: non-labored breathing  Cardio: Regular rate and rhythm S1/S2 heard  Abdominal: Soft, non-tender, non-distended, no appreciable masses, normoactive bowel sounds  : No CVA tenderness  Extremities: Nontender, no clubbing, cyanosis, or edema  Skin: Warm, dry, intact without rashes or lesions      Analgesic Dosing for past 24 hours reviewed as below:    ALPRAZolam   1 milliGRAM(s) Oral (09-29-24 @ 14:21)    FLUoxetine   80 milliGRAM(s) Oral (09-29-24 @ 21:39)    HYDROmorphone  Injectable   2 milliGRAM(s) IV Push (09-29-24 @ 18:31)   2 milliGRAM(s) IV Push (09-29-24 @ 14:24)    HYDROmorphone  Injectable   1 milliGRAM(s) IV Push (09-30-24 @ 06:54)   1 milliGRAM(s) IV Push (09-29-24 @ 21:37)   1 milliGRAM(s) IV Push (09-29-24 @ 15:53)   1 milliGRAM(s) IV Push (09-29-24 @ 11:03)    HYDROmorphone  Injectable   2 milliGRAM(s) IV Push (09-30-24 @ 04:30)   2 milliGRAM(s) IV Push (09-30-24 @ 00:34)    HYDROmorphone  Injectable   0.5 milliGRAM(s) IV Push (09-29-24 @ 17:47)    memantine   10 milliGRAM(s) Oral (09-30-24 @ 06:21)   10 milliGRAM(s) Oral (09-29-24 @ 17:47)    methadone    Tablet   15 milliGRAM(s) Oral (09-29-24 @ 21:35)    methadone    Tablet   10 milliGRAM(s) Oral (09-30-24 @ 06:22)   10 milliGRAM(s) Oral (09-29-24 @ 13:56)    methylphenidate   20 milliGRAM(s) Oral (09-30-24 @ 06:19)    pregabalin   200 milliGRAM(s) Oral (09-30-24 @ 06:19)   200 milliGRAM(s) Oral (09-29-24 @ 21:36)   200 milliGRAM(s) Oral (09-29-24 @ 13:56)          T(C): 36.7 (09-30-24 @ 08:27), Max: 36.7 (09-29-24 @ 16:05)  HR: 98 (09-30-24 @ 08:27) (73 - 99)  BP: 102/67 (09-30-24 @ 08:27) (98/65 - 102/67)  RR: 18 (09-30-24 @ 08:27) (18 - 20)  SpO2: 96% (09-30-24 @ 08:27) (94% - 96%)      09-29-24 @ 07:01  -  09-30-24 @ 07:00  --------------------------------------------------------  IN: 600 mL / OUT: 0 mL / NET: 600 mL        albuterol    90 MICROgram(s) HFA Inhaler 2 Puff(s) Inhalation every 6 hours PRN  chlorhexidine 2% Cloths 1 Application(s) Topical daily  dexAMETHasone     Tablet 4 milliGRAM(s) Oral every 6 hours  diazepam    Tablet 2 milliGRAM(s) Oral at bedtime PRN  FLUoxetine 80 milliGRAM(s) Oral at bedtime  furosemide    Tablet 40 milliGRAM(s) Oral daily  HYDROmorphone  Injectable 2 milliGRAM(s) IV Push every 3 hours PRN  HYDROmorphone  Injectable 1 milliGRAM(s) IV Push every 4 hours PRN  memantine 10 milliGRAM(s) Oral every 12 hours  methadone    Tablet 15 milliGRAM(s) Oral <User Schedule>  methadone    Tablet 10 milliGRAM(s) Oral <User Schedule>  methylphenidate 20 milliGRAM(s) Oral two times a day  octreotide  Injectable 100 MICROGram(s) SubCutaneous two times a day  pantoprazole    Tablet 40 milliGRAM(s) Oral every 12 hours  polyethylene glycol 3350 17 Gram(s) Oral daily PRN  pregabalin 200 milliGRAM(s) Oral three times a day  simethicone 80 milliGRAM(s) Chew four times a day PRN                          10.1   4.52  )-----------( 165      ( 30 Sep 2024 04:10 )             33.0     09-30    138  |  102  |  11.9  ----------------------------<  163[H]  4.0   |  27.0  |  0.48[L]    Ca    8.4      30 Sep 2024 04:10  Mg     2.0     09-30        Urinalysis Basic - ( 30 Sep 2024 04:10 )    Color: x / Appearance: x / SG: x / pH: x  Gluc: 163 mg/dL / Ketone: x  / Bili: x / Urobili: x   Blood: x / Protein: x / Nitrite: x   Leuk Esterase: x / RBC: x / WBC x   Sq Epi: x / Non Sq Epi: x / Bacteria: x        Pain Service   [phone]

## 2024-09-30 NOTE — CONSULT NOTE ADULT - ASSESSMENT
38F PMH metastatic breast ca to liver, lung, brain, bone presented with left facial numbness. Pt is under the care of Dr. Chapa & was undergoing chemotherapy which was ultimately stopped d/t a drop in her hemoglobin. She states that on Friday she developed L facial numbness, L sided weakness, dysarthria, & word finding difficulty.   -MRI brain shows B/L cerebellar & L frontoparietal metastatic lesions & B/L cerebellar leptomeningeal enhancement    Plan:  -Q4h neuro checks   -Ok from a NSG standpoint to undergo LP for intrathecal methotrexate  -Heme/onc following; appreciate recs, considering outpt RT  -Pain control PRN  -Neurology following; vEEG shows no seizures today   -Further medical mgmt per primary team  -D/w Dr. Rosas

## 2024-09-30 NOTE — CONSULT NOTE ADULT - REASON FOR ADMISSION
Left facial numbness

## 2024-09-30 NOTE — CONSULT NOTE ADULT - CONSULT REQUESTED DATE/TIME
30-Sep-2024 08:48
30-Sep-2024 15:21
shortness of breath
29-Sep-2024 19:09
30-Sep-2024
30-Sep-2024 22:00
28-Sep-2024 15:29

## 2024-09-30 NOTE — EEG REPORT - NS EEG TEXT BOX
[facility]-[id number]     Study Date: 9/29/24 08:00 - 9/30/24 08:00  Duration: 23 hr 57 min  --------------------------------------------------------------------------------------------------  History:  CC/ HPI Patient is a 38y old  Female who presents with a chief complaint of Left facial numbness (30 Sep 2024 09:47)    MEDICATIONS  (STANDING):  chlorhexidine 2% Cloths 1 Application(s) Topical daily  dexAMETHasone     Tablet 4 milliGRAM(s) Oral every 6 hours  FLUoxetine 80 milliGRAM(s) Oral at bedtime  furosemide    Tablet 40 milliGRAM(s) Oral daily  memantine 10 milliGRAM(s) Oral every 12 hours  methadone    Tablet 15 milliGRAM(s) Oral <User Schedule>  methadone    Tablet 10 milliGRAM(s) Oral <User Schedule>  methylphenidate 20 milliGRAM(s) Oral two times a day  octreotide  Injectable 100 MICROGram(s) SubCutaneous two times a day  pantoprazole    Tablet 40 milliGRAM(s) Oral every 12 hours  pregabalin 200 milliGRAM(s) Oral three times a day    --------------------------------------------------------------------------------------------------  Study Interpretation:    [[[Abbreviation Key:  PDR=alpha rhythm/posterior dominant rhythm. A-P=anterior posterior.  Amplitude: ‘very low’:<20; ‘low’:20-49; ‘medium’:; ‘high’:>150uV.  Persistence for periodic/rhythmic patterns (% of epoch) ‘rare’:<1%; ‘occasional’:1-10%; ‘frequent’:10-50%; ‘abundant’:50-90%; ‘continuous’:>90%.  Persistence for sporadic discharges: ‘rare’:<1/hr; ‘occasional’:1/min-1/hr; ‘frequent’:>1/min; ‘abundant’:>1/10 sec.  RPP=rhythmic and periodic patterns; GRDA=generalized rhythmic delta activity; FIRDA=frontal intermittent GRDA; LRDA=lateralized rhythmic delta activity; TIRDA=temporal intermittent rhythmic delta activity;  LPD=PLED=lateralized periodic discharges; GPD=generalized periodic discharges; BIPDs =bilateral independent periodic discharges; Mf=multifocal; SIRPDs=stimulus induced rhythmic, periodic, or ictal appearing discharges; BIRDs=brief potentially ictal rhythmic discharges >4 Hz, lasting .5-10s; PFA (paroxysmal bursts >13 Hz or =8 Hz <10s).  Modifiers: +F=with fast component; +S=with spike component; +R=with rhythmic component.  S-B=burst suppression pattern.  Max=maximal. N1-drowsy; N2-stage II sleep; N3-slow wave sleep. SSS/BETS=small sharp spikes/benign epileptiform transients of sleep. HV=hyperventilation; PS=photic stimulation]]]    Daily EEG Visual Analysis    FINDINGS:      Background:  Continuity: Continuous  Symmetry: Symmetric  Posterior dominant rhythm (PDR): 7.5-8 Hz, reactive to eye closure. Symmetric low-amplitude frontal beta in wakefulness.  Voltage: Normal  Anterior-Posterior Gradient: Present  Other background findings: None  Breach: Absent    Background Slowing:  Generalized slowing: As above  Focal slowing: None    State Changes:   Drowsiness is characterized by fragmentation, attenuation, and slowing of the background activity.  Stage 2 sleep is characterized by symmetric K complexes and sleep spindles.   Slow-wave sleep is characterized by diffuse delta activity.    Interictal Findings:  None    Electrographic and Electroclinical seizures:  None    Other Clinical Events:  Event button presses, possibly erroneous, with EEG showing awake background and no electrographic seizure.  08:38, 08:39: Patient getting up from bed and walking with assistance.  23:56: Patient standing initially mostly out of view of camera, then seen standing and getting back into bed.    Activation Procedures:   Hyperventilation is not performed.    Photic stimulation is not performed.    Artifacts:  Intermittent myogenic and movement artifacts are present.    Single-lead EKG: Regular rhythm    EEG Classification / Summary:  Abnormal video-EEG in the awake, drowsy, and asleep states.   Mild diffuse slowing.  No focal or epileptiform abnormalities are captured.   No seizures.    Clinical Impression:  Mild diffuse cerebral dysfunction is nonspecific in etiology.   No epileptiform abnormalities or seizures.          -------------------------------------------------------------------------------------------------------    Kamala Gudino MD  Attending Physician, [facility]    -------------------------------------------------------------------------------------------------------    To reach EEG technologist:  Please use the pager number for the appropriate hospital or contact the .  At [facility] - Pager #: [phone]    To reach EEG-reading physician:  EEG Reading Room Phone #: [phone]  Epilepsy Answering Service after 5PM and before 8:30AM: Phone #: [phone]     [facility]-[id number]     Study Date: 9/29/24 08:00 - 9/30/24 14:56  Duration: 30 hr 51 min  --------------------------------------------------------------------------------------------------  History:  CC/ HPI Patient is a 38y old  Female who presents with a chief complaint of Left facial numbness (30 Sep 2024 09:47)    MEDICATIONS  (STANDING):  chlorhexidine 2% Cloths 1 Application(s) Topical daily  dexAMETHasone     Tablet 4 milliGRAM(s) Oral every 6 hours  FLUoxetine 80 milliGRAM(s) Oral at bedtime  furosemide    Tablet 40 milliGRAM(s) Oral daily  memantine 10 milliGRAM(s) Oral every 12 hours  methadone    Tablet 15 milliGRAM(s) Oral <User Schedule>  methadone    Tablet 10 milliGRAM(s) Oral <User Schedule>  methylphenidate 20 milliGRAM(s) Oral two times a day  octreotide  Injectable 100 MICROGram(s) SubCutaneous two times a day  pantoprazole    Tablet 40 milliGRAM(s) Oral every 12 hours  pregabalin 200 milliGRAM(s) Oral three times a day    --------------------------------------------------------------------------------------------------  Study Interpretation:    [[[Abbreviation Key:  PDR=alpha rhythm/posterior dominant rhythm. A-P=anterior posterior.  Amplitude: ‘very low’:<20; ‘low’:20-49; ‘medium’:; ‘high’:>150uV.  Persistence for periodic/rhythmic patterns (% of epoch) ‘rare’:<1%; ‘occasional’:1-10%; ‘frequent’:10-50%; ‘abundant’:50-90%; ‘continuous’:>90%.  Persistence for sporadic discharges: ‘rare’:<1/hr; ‘occasional’:1/min-1/hr; ‘frequent’:>1/min; ‘abundant’:>1/10 sec.  RPP=rhythmic and periodic patterns; GRDA=generalized rhythmic delta activity; FIRDA=frontal intermittent GRDA; LRDA=lateralized rhythmic delta activity; TIRDA=temporal intermittent rhythmic delta activity;  LPD=PLED=lateralized periodic discharges; GPD=generalized periodic discharges; BIPDs =bilateral independent periodic discharges; Mf=multifocal; SIRPDs=stimulus induced rhythmic, periodic, or ictal appearing discharges; BIRDs=brief potentially ictal rhythmic discharges >4 Hz, lasting .5-10s; PFA (paroxysmal bursts >13 Hz or =8 Hz <10s).  Modifiers: +F=with fast component; +S=with spike component; +R=with rhythmic component.  S-B=burst suppression pattern.  Max=maximal. N1-drowsy; N2-stage II sleep; N3-slow wave sleep. SSS/BETS=small sharp spikes/benign epileptiform transients of sleep. HV=hyperventilation; PS=photic stimulation]]]    Daily EEG Visual Analysis    FINDINGS:      Background:  Continuity: Continuous  Symmetry: Symmetric  Posterior dominant rhythm (PDR): 7.5-8 Hz, reactive to eye closure. Symmetric low-amplitude frontal beta in wakefulness.  Voltage: Normal  Anterior-Posterior Gradient: Present  Other background findings: None  Breach: Absent    Background Slowing:  Generalized slowing: As above  Focal slowing: No persistent focal slowing    State Changes:   Drowsiness is characterized by fragmentation, attenuation, and slowing of the background activity.  Stage 2 sleep is characterized by symmetric K complexes and sleep spindles.   Slow-wave sleep is characterized by diffuse delta activity.    Interictal Findings:  None    Electrographic and Electroclinical seizures:  None    Other Clinical Events:  Event button presses, possibly erroneous, with EEG showing awake background and no electrographic seizure.  9/29/24 08:38, 08:39: Patient getting up from bed and walking with assistance.  9/29/24 23:56: Patient standing initially mostly out of view of camera, then seen standing and getting back into bed.  9/30/24 08:44: Patient not in view of camera.    Activation Procedures:   Hyperventilation is not performed.    Photic stimulation is not performed.    Artifacts:  Intermittent myogenic and movement artifacts are present.    Single-lead EKG: Regular rhythm up to 120 bpm    EEG Classification / Summary:  Abnormal video-EEG in the awake, drowsy, and asleep states.   Mild diffuse slowing.  No focal or epileptiform abnormalities are captured.   No seizures.    Clinical Impression:  Mild diffuse cerebral dysfunction is nonspecific in etiology.   No epileptiform abnormalities or seizures.          -------------------------------------------------------------------------------------------------------    Kamala Gudino MD  Attending Physician, [facility]    -------------------------------------------------------------------------------------------------------    To reach EEG technologist:  Please use the pager number for the appropriate hospital or contact the .  At [facility] - Pager #: [phone]    To reach EEG-reading physician:  EEG Reading Room Phone #: [phone]  Epilepsy Answering Service after 5PM and before 8:30AM: Phone #: [phone]

## 2024-09-30 NOTE — DISCHARGE NOTE NURSING/CASE MANAGEMENT/SOCIAL WORK - PATIENT PORTAL LINK FT
You can access the FollowMyHealth Patient Portal offered by [facility] by registering at the following website: [email or web address]. By joining [facility]’s FollowMyHealth portal, you will also be able to view your health information using other applications (apps) compatible with our system.

## 2024-09-30 NOTE — CONSULT NOTE ADULT - ATTENDING COMMENTS
Patient seen and examined. Case discussed with Dr. Hui and agree with recommendations outlined above. I have personally reviewed the imaging and labs listed above.    Rehab/Impaired mobility and function -  Patient continues to require hospitalization for the above diagnoses and ongoing active management of comorbid complications  that are substantially posing a threat to bodily function, functional ability and quality of life.    RECOMMEND - OOB daily, Turn Q2 when needed, HOB >30 degrees      When medically optimized, based on the patient's diagnosis, current functional status and potential for progress, expect patient to achieve functional goals for DC HOME with HOME CARE.     Will continue to follow. Rehab recommendations are dependent on how functional progress changes as well as how patient continues to participate and tolerate therapeutic interventions, which may change. Recommend ongoing mobilization by staff to maintain cardiopulmonary function and prevention of secondary complications related to debility. Discussed the specific rehabilitation management and recommendations above with rehab clinical care team/rehab liaison.      Total Time Spent on Encounter (reviewing clinical notes, labs, radiology and medications, reviewing patient history, pre-rounding, physical exam, assessment and discussing rehabilitation recommendations - with consideration of prior level of function, expected level of recovery and return to community living, rounding with team) - 75 minutes

## 2024-09-30 NOTE — PROGRESS NOTE ADULT - SUBJECTIVE AND OBJECTIVE BOX
Greta Lazar MD (Available on [employer])  [facility] Division of Hospital Medicine    Chief Complaint:  facial numbness    SUBJECTIVE / OVERNIGHT EVENTS:  Pt seen and examined at bedside. Pt c/o left sided headache. Request [place] eval.    Patient denies chest pain, SOB, abd pain, N/V, fever, chills, dysuria or any other complaints. All remainder ROS negative.     INTERVAL EVENTS:  -Pt hemodynamically stable with no overnight events  -MRI brain w/contrast: Left parasagittal frontoparietal and bilateral cerebellar hemisphere   metastatic parenchymal lesions as detailed in the body the report. Additional abnormal leptomeningeal enhancement in the right and possibly left cerebellar hemispheres.  -Heme/onc eval appreciated. They will contact op RT. Will ask if [place] needs to be involved  -On Dexamethasone for cereberal edema  -Pain medicine consulted. Appreciate recs  -Neurology on board  -EEG with no epileptiform activity detected      MEDICATIONS  (STANDING):  chlorhexidine 2% Cloths 1 Application(s) Topical daily  dexAMETHasone     Tablet 4 milliGRAM(s) Oral every 6 hours  FLUoxetine 80 milliGRAM(s) Oral at bedtime  furosemide    Tablet 40 milliGRAM(s) Oral daily  HYDROmorphone PCA (1 mG/mL) 30 milliLiter(s) PCA Continuous PCA Continuous  memantine 10 milliGRAM(s) Oral every 12 hours  methadone    Tablet 10 milliGRAM(s) Oral <User Schedule>  methadone    Tablet 15 milliGRAM(s) Oral <User Schedule>  methylphenidate 20 milliGRAM(s) Oral two times a day  octreotide  Injectable 100 MICROGram(s) SubCutaneous two times a day  pantoprazole    Tablet 40 milliGRAM(s) Oral every 12 hours  pregabalin 200 milliGRAM(s) Oral three times a day    MEDICATIONS  (PRN):  albuterol    90 MICROgram(s) HFA Inhaler 2 Puff(s) Inhalation every 6 hours PRN Shortness of Breath and/or Wheezing  diazepam    Tablet 2 milliGRAM(s) Oral at bedtime PRN insomnia  HYDROmorphone  Injectable 2 milliGRAM(s) IV Push every 3 hours PRN Severe Pain (7 - 10)  HYDROmorphone  Injectable 1 milliGRAM(s) IV Push every 4 hours PRN Breakthrough Pain  naloxone Injectable 0.1 milliGRAM(s) IV Push every 3 minutes PRN For ANY of the following changes in patient status:  A. RR LESS THAN 10 breaths per minute, B. Oxygen saturation LESS THAN 90%, C. Sedation score of 6  ondansetron Injectable 4 milliGRAM(s) IV Push every 6 hours PRN Nausea  polyethylene glycol 3350 17 Gram(s) Oral daily PRN Constipation  simethicone 80 milliGRAM(s) Chew four times a day PRN Gas        I&O's Summary    29 Sep 2024 07:01  -  30 Sep 2024 07:00  --------------------------------------------------------  IN: 600 mL / OUT: 0 mL / NET: 600 mL        PHYSICAL EXAM:  Vital Signs Last 24 Hrs  T(C): 36.7 (30 Sep 2024 08:27), Max: 36.7 (29 Sep 2024 16:05)  T(F): 98 (30 Sep 2024 08:27), Max: 98.1 (29 Sep 2024 16:05)  HR: 83 (30 Sep 2024 10:26) (73 - 99)  BP: 111/74 (30 Sep 2024 10:26) (98/65 - 111/74)  BP(mean): 87 (30 Sep 2024 10:26) (76 - 87)  RR: 20 (30 Sep 2024 10:26) (18 - 20)  SpO2: 96% (30 Sep 2024 10:26) (94% - 96%)    Parameters below as of 30 Sep 2024 10:26  Patient On (Oxygen Delivery Method): room air            CONSTITUTIONAL: NAD  ENMT: Moist oral mucosa  RESPIRATORY: Normal respiratory effort; lungs are clear to auscultation bilaterally  CARDIOVASCULAR: Regular rate and rhythm, normal S1 and S2, no murmur/rub/gallop  ABDOMEN: Nontender to palpation, normoactive bowel sounds, no rebound/guarding; No hepatosplenomegaly  EXTREMITIES: No LE swelling   PSYCH: A+O to person, place, and time; affect appropriate  NEUROLOGY: CN 2-12 are intact and symmetric; no gross sensory deficits;   SKIN: No rashes; no palpable lesions    LABS:                        10.1   4.52  )-----------( 165      ( 30 Sep 2024 04:10 )             33.0     09-30    138  |  102  |  11.9  ----------------------------<  163[H]  4.0   |  27.0  |  0.48[L]    Ca    8.4      30 Sep 2024 04:10  Mg     2.0     09-30            Urinalysis Basic - ( 30 Sep 2024 04:10 )    Color: x / Appearance: x / SG: x / pH: x  Gluc: 163 mg/dL / Ketone: x  / Bili: x / Urobili: x   Blood: x / Protein: x / Nitrite: x   Leuk Esterase: x / RBC: x / WBC x   Sq Epi: x / Non Sq Epi: x / Bacteria: x        Culture - Urine (collected 27 Sep 2024 23:28)  Source: Clean Catch Clean Catch (Midstream)  Final Report (28 Sep 2024 23:34):    <10,000 CFU/mL Normal Urogenital Stacie      CAPILLARY BLOOD GLUCOSE      POCT Blood Glucose.: 175 mg/dL (29 Sep 2024 21:48)        RADIOLOGY & ADDITIONAL TESTS:  Results Reviewed:   Imaging Personally Reviewed:  Electrocardiogram Personally Reviewed:

## 2024-09-30 NOTE — PROGRESS NOTE ADULT - SUBJECTIVE AND OBJECTIVE BOX
[facility]                                        Neurology at [place]                                 Raul Florentino & Naveed                                  [address]                                        [place], [place], [address]                                             [phone]        CC: Brain metastases     HPI:   The patient is a 38y Female who presented with left sided numbness and weakness that started yesterday.  Her father noticed that she was having intermittent facial weakness.  She has stage 4 breast cancer, mets to brain and bone.  She has multiple brain mets, is s/p both whole brain XRT and focussed gamma knife surgery. She has been off chemo due to anemia.  Neurology is asked to evaluate. (AR).     Interim history:  On 4 [place]ett.     ROS:   Denies headache or dizziness.  Denies chest pain.  Denies shortness of breath.    MEDICATIONS  (STANDING):  chlorhexidine 2% Cloths 1 Application(s) Topical daily  dexAMETHasone     Tablet 4 milliGRAM(s) Oral every 6 hours  FLUoxetine 80 milliGRAM(s) Oral at bedtime  furosemide    Tablet 40 milliGRAM(s) Oral daily  HYDROmorphone PCA (1 mG/mL) 30 milliLiter(s) PCA Continuous PCA Continuous  memantine 10 milliGRAM(s) Oral every 12 hours  methadone    Tablet 10 milliGRAM(s) Oral <User Schedule>  methadone    Tablet 15 milliGRAM(s) Oral <User Schedule>  methylphenidate 20 milliGRAM(s) Oral two times a day  octreotide  Injectable 100 MICROGram(s) SubCutaneous two times a day  pantoprazole    Tablet 40 milliGRAM(s) Oral every 12 hours  pregabalin 200 milliGRAM(s) Oral three times a day    Vital Signs Last 24 Hrs  T(C): 36.7 (30 Sep 2024 08:27), Max: 36.7 (29 Sep 2024 16:05)  T(F): 98 (30 Sep 2024 08:27), Max: 98.1 (29 Sep 2024 16:05)  HR: 83 (30 Sep 2024 10:26) (73 - 99)  BP: 111/74 (30 Sep 2024 10:26) (98/65 - 111/74)  BP(mean): 87 (30 Sep 2024 10:26) (76 - 87)  RR: 20 (30 Sep 2024 10:26) (18 - 20)  SpO2: 96% (30 Sep 2024 10:26) (94% - 96%)    Parameters below as of 30 Sep 2024 10:26  Patient On (Oxygen Delivery Method): room air    Detailed Neurologic Exam:    Mental status: The patient is awake and alert. There is no aphasia. There is no dysarthria.     Cranial nerves: Pupils equal and react symmetrically to light. There is no visual field deficit to threat. Extraocular motion is full with no nystagmus. Facial sensation is intact. Facial musculature is asymmetric, very slight left facial weakness. Hearing poor. Palate elevates symmetrically. Tongue is midline.    Motor: There is normal bulk and tone.  There is no tremor.  Strength grossly 5/5 bilaterally.    Sensation: Grossly intact to light touch and pin. Left dory sensory loss has improved    Reflexes: 2+ throughout and plantar responses are flexor.    Cerebellar: No dysmetria on finger nose testing.    Labs:     09-30    138  |  102  |  11.9  ----------------------------<  163[H]  4.0   |  27.0  |  0.48[L]    Ca    8.4      30 Sep 2024 04:10  Mg     2.0     09-30                              10.1   4.52  )-----------( 165      ( 30 Sep 2024 04:10 )             33.0       Rad:   Brain MRI images reviewed.   Left parasagittal frontoparietal and bilateral cerebellar hemisphere metastatic parenchymal lesions.  Additional abnormal leptomeningeal enhancement in the right and possibly left cerebellar hemispheres.    EEG with slowing but no seizure activity.

## 2024-09-30 NOTE — PROGRESS NOTE ADULT - ASSESSMENT
· Assessment	  Assessment/Plan:  39 y/o female with h/o metastatic breast cancer , mets to brain came to the er for lt. facial  numbness and droop  admitted for stroke work up. MRI brain with new hemorrhagic mets.       #Lt facial numbness  #MRI brain with new hemorrhagic Metastasis  #Stroke ruled out  CT scan studies for stroke no acute findings.   follow mri w/contrast> as above  neuro checks.   follow lipid panel and statin plan per findings, ct studies no acute findings for stroke.   recent echo from july 2024, EF 55-60 %  Holding Asa and lovenox  neuro consulted  Heme/onc consulted. Op Radiation for possible radiation    #Hx of Metastatic Breast cancer  -pt follows with Dr Chapa at [facility]  -Hemeonc consult placed   -Pain meds resumed as per home regimen  -Also on Dilaudid 2mg iv q3h PRN for severe pain  -Dilaudid 1mg IV q4h PRN for breakthrough  -Pain mx consulted    #Hypokalemia: Now resolved   -Monitor BMP    #Anxiety  #Insomnia  -continue pt's prozac  -Valium at bedtime     #Normocytic Anemia likely AOCD  -Today Hb 10.2, anemia in the setting of metastatic breast cancer. got 2 units of prbc 3 days ago for Hb 6.8.  -Check iron panel in AM. Normal Iron, elevated Ferritin hence AOCD    Dispo: medically acute.

## 2024-09-30 NOTE — PROGRESS NOTE ADULT - ASSESSMENT
38y Female who is followed by neurology because of left sided weakness/numbness    Metastatic beast cancer to brain  Her symptoms are likely secondary to metastases.  No evidence of stroke on MRI.  Will discontinue c-EEG.   Avoid antiplatelt/anticoagulants as risk for hemorrhage into mets.

## 2024-09-30 NOTE — SPEECH LANGUAGE PATHOLOGY EVALUATION - SLP GENERAL OBSERVATIONS
Pt received & seen seated upright in bed, awake/alert, oriented, pleasant/cooperative, 101/0 headache/back pain (team aware)

## 2024-09-30 NOTE — SPEECH LANGUAGE PATHOLOGY EVALUATION - SLP CONVERSATIONAL SPEECH
Fluent, appropriate syntax & semantics, some hesitations/pauses noted in running speech however functional t/o assessment

## 2024-09-30 NOTE — CONSULT NOTE ADULT - SUBJECTIVE AND OBJECTIVE BOX
HPI:  38yF was admitted on 09-27 with new onset left facial numbness and droop, as well as left lower extremity weakness, found to have increased R cerebellar edema , deja cerebellar hemispheric metastatic parenchymal lesions +concerning for leptomeningeal enhancement in R and L cerebellar hemispheres.            Imaging Reviewed Today:      MR Head with/without Cont (09.28.24 @ 08:44)  IMPRESSION:  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  Patient shouldreturn for contrast-enhanced images.  Marked progression of edema in the right cerebellum extending into the   right brachium pontis and cranial nerves V root entry zone.    IMPRESSION:    Left parasagittal frontoparietal and bilateral cerebellar hemisphere   metastatic parenchymal lesions as detailed in the body the report.    Additional abnormal leptomeningeal enhancement in the right and possibly   left cerebellar hemispheres.        EEG 9/29  Clinical Impression:  Normal EEG study in the awake / drowsy / asleep states.  There were no epileptiform abnormalities recorded.            ----------------------------------------------------------------------------------------  CHIEF COMPLAINT  Ms Dave states that she has a lot of family and support.  She is interested in being able to be independent at home, but she has a fair amount of body pain which she occasionally feels can limit her in what she can physically do at home.          ----------------------------------------------------------------------------------------  VITALS  T(C): 36.6 (09-30-24 @ 15:17), Max: 36.7 (09-29-24 @ 16:05)  HR: 97 (09-30-24 @ 15:17) (73 - 99)  BP: 108/73 (09-30-24 @ 15:17) (98/65 - 111/74)  RR: 20 (09-30-24 @ 15:17) (18 - 20)  SpO2: 93% (09-30-24 @ 15:17) (93% - 96%)  Wt(kg): --    PAST MEDICAL & SURGICAL HISTORY  No pertinent past medical history    Breast CA    H/O compression fracture of spine    Anxiety    Metastatic breast cancer    H/O pleural effusion    Pericardial effusion    No significant past surgical history    S/P tonsillectomy    H/O chest tube placement    S/P pericardiocentesis          LABS  REVIEWED    CBC Full  -  ( 30 Sep 2024 04:10 )  WBC Count : 4.52 K/uL  RBC Count : 3.38 M/uL  Hemoglobin : 10.1 g/dL  Hematocrit : 33.0 %  Platelet Count - Automated : 165 K/uL  Mean Cell Volume : 97.6 fl  Mean Cell Hemoglobin : 29.9 pg  Mean Cell Hemoglobin Concentration : 30.6 gm/dL  Auto Neutrophil # : x  Auto Lymphocyte # : x  Auto Monocyte # : x  Auto Eosinophil # : x  Auto Basophil # : x  Auto Neutrophil % : x  Auto Lymphocyte % : x  Auto Monocyte % : x  Auto Eosinophil % : x  Auto Basophil % : x    09-30    138  |  102  |  11.9  ----------------------------<  163[H]  4.0   |  27.0  |  0.48[L]    Ca    8.4      30 Sep 2024 04:10  Mg     2.0     09-30      Urinalysis Basic - ( 30 Sep 2024 04:10 )    Color: x / Appearance: x / SG: x / pH: x  Gluc: 163 mg/dL / Ketone: x  / Bili: x / Urobili: x   Blood: x / Protein: x / Nitrite: x   Leuk Esterase: x / RBC: x / WBC x   Sq Epi: x / Non Sq Epi: x / Bacteria: x        ALLERGIES  pertuzumab (Other (Severe))  Perjeta (Other (Severe))      MEDICATIONS   albuterol    90 MICROgram(s) HFA Inhaler 2 Puff(s) Inhalation every 6 hours PRN  chlorhexidine 2% Cloths 1 Application(s) Topical daily  dexAMETHasone     Tablet 4 milliGRAM(s) Oral every 6 hours  diazepam    Tablet 2 milliGRAM(s) Oral at bedtime PRN  FLUoxetine 80 milliGRAM(s) Oral at bedtime  furosemide    Tablet 40 milliGRAM(s) Oral daily  HYDROmorphone  Injectable 2 milliGRAM(s) IV Push every 3 hours PRN  HYDROmorphone  Injectable 1 milliGRAM(s) IV Push every 4 hours PRN  HYDROmorphone PCA (1 mG/mL) 30 milliLiter(s) PCA Continuous PCA Continuous  memantine 10 milliGRAM(s) Oral every 12 hours  methadone    Tablet 10 milliGRAM(s) Oral <User Schedule>  methadone    Tablet 15 milliGRAM(s) Oral <User Schedule>  methylphenidate 20 milliGRAM(s) Oral two times a day  naloxone Injectable 0.1 milliGRAM(s) IV Push every 3 minutes PRN  octreotide  Injectable 100 MICROGram(s) SubCutaneous two times a day  ondansetron Injectable 4 milliGRAM(s) IV Push every 6 hours PRN  pantoprazole    Tablet 40 milliGRAM(s) Oral every 12 hours  polyethylene glycol 3350 17 Gram(s) Oral daily PRN  pregabalin 200 milliGRAM(s) Oral three times a day  simethicone 80 milliGRAM(s) Chew four times a day PRN          ----------------------------------------------------------------------------------------  FUNCTIONAL HISTORY  Lives   Independent    CURRENT FUNCTIONAL STATUS    9-28 OT    Bed Mobility: Rolling/Turning:     · Level of [place]	independent    Bed Mobility: Sit to Supine:     · Level of [place]	independent    Bed Mobility: Supine to Sit:     · Level of [place]	independent    Bed Mobility Analysis:     · Impairments Contributing to Impaired Bed Mobility	pain    Transfer: Sit to Stand:     · Level of [place]	independent    Transfer: Stand to Sit:     · Level of [place]	independent    Sit/Stand Transfer Safety Analysis:     · Impairments Contributing to Impaired Transfers	pain    Transfer: Toilet Transfer:     · Level of [place]	independent; low toilet    Toilet Transfer Safety Analysis:     · Impairments Contributing to Impaired Transfers	pain    9/28 PT    Bed Mobility: Sit to Supine:     · Level of [place]	independent    Bed Mobility: Supine to Sit:     · Level of [place]	independent    Transfer: Sit to Stand:     · Level of [place]	independent  · Weight-Bearing Restrictions	full weight-bearing    Transfer: Stand to Sit:     · Level of [place]	independent  · Weight-Bearing Restrictions	full weight-bearing    Sit/Stand Transfer Safety Analysis:     · Impairments Contributing to Impaired Transfers	pain    Gait Skills:     · Level of [place]	independent  · Weight-Bearing Restrictions	full weight-bearing  · Gait Distance	150 feet    Gait Analysis:     · Gait Pattern Used	2-point gait  · Gait Deviations Noted	decreased step length  · Impairments Contributing to Gait Deviations	decreased strength; pain    Stair Negotiation:     · Level of [place]	independent  · Weight-Bearing Restrictions	full weight-bearing  · Assistive Device	bilateral rails  · Stair Pattern	step to step  · Number of Stairs	5            ----------------------------------------------------------------------------------------  PHYSICAL EXAM  Constitutional - NAD, Comfortable  HEENT - NCAT, EOMI, EEG on cranium  Chest - Breathing comfortably  Cardiovascular -  extremities warm and well perfused  Abdomen - Non-distended  Neurologic Exam -                    Cognitive - AAO to self, place, date, year, situation     Communication -  dysarthria noted     Cranial Nerves - CN 2-12 intact     FUNCTIONAL MOTOR EXAM - No focal deficits                    LEFT    UE - ShAB 5/5, EF 5/5, EE 5/5, WE 5/5,  5/5                    RIGHT UE - ShAB 5/5, EF 5/5, EE 5/5, WE 5/5,  5/5                    LEFT    LE - HF 3/5, KE 4/5, DF 5/5, PF 5/5                    RIGHT LE - HF 5/5, KE 5/5, DF 5/5, PF 5/5        Sensory - Intact to LT, proprioception in tact DEJA hallux  Psychiatric - Mood stable, Affect WNL  ----------------------------------------------------------------------------------------  ASSESSMENT/PLAN  38yFemale with functional deficits after presenting 9/23 with new onset left facial numbness and droop, as well as left lower extremity weakness, found to have increased R cerebellar edema , deja cerebellar hemispheric metastatic parenchymal lesions +concerning for leptomeningeal enhancement in R and L cerebellar hemispheres.    New hemorrhagic brain metastasis, cerebral edema - dexamethasone  Pain - Tylenol, hydromorphone, methadone, pregabalin  Anxiety - diazepam, fluoxetine  Edema - furosemide  Attention - memantine, methylphenidate  GI - protonix, miralax, simethicone  DVT PPX - SCDs  Impaired Mobility/Function/Rehab Recommendations -   Functional mobility limitations - initiate PT /OT and continue to work with Ms Dave to tolerate her pain symptoms using compensatory strategies.  Encourage OOB as able to, and bed and chair level exercises.      Disposition - likely appropriate to discharge to home.  Continue with outpatient SLP, PT, OT as needed.

## 2024-09-30 NOTE — CONSULT NOTE ADULT - SUPERVISING ATTENDING
I personally reviewed the patient's imaging. History and plan discussed with PA team, agree with above.

## 2024-09-30 NOTE — PROGRESS NOTE ADULT - SUBJECTIVE AND OBJECTIVE BOX
HPI: Patient is a 38y Female seen on consultation for the evaluation and management of metastatic breast cancer, ER/WV neg, Her-2 pos, originally diagnosed 10/21, treated with Taxane/Herceptin, Enhertu, now receiving treatment with Kadcyla, on hold because of anemia.  Has known bone, liver, brain mets; has had radiation  Admitted with headache, left fail numbness, mild LLE weakness.  Had HRI with contrast that showed extensive disease, with left parasagittal, fronto parietal, bilateral hemispheric lesions, bilateral leptomeningeal enhancement.  Currently c/o significant headache, despite Decadron and Dilaudid and Methadone.  Denies c/o SOB, chest pain.  Has nausea.  Denies numbness tingling dysuria or hematuria    Reports anemia; has been requiring PRBC tx; Hb now 10.1 post transfusion  Reports headaches are better; awaiting pain management; no new neurologic deficits    PAST MEDICAL & SURGICAL HISTORY:  H/O compression fracture of spine      Anxiety      Metastatic breast cancer      H/O pleural effusion      Pericardial effusion      S/P tonsillectomy      H/O chest tube placement  12/23/21      S/P pericardiocentesis  12/28/21          REVIEW OF SYSTEMS      General:Reports significant headaches, nausea, left sided numbness, tingling of face, droolling, mild dysarthria	    	        MEDICATIONS  (STANDING):  chlorhexidine 2% Cloths 1 Application(s) Topical daily  dexAMETHasone     Tablet 4 milliGRAM(s) Oral every 6 hours  FLUoxetine 80 milliGRAM(s) Oral at bedtime  furosemide    Tablet 40 milliGRAM(s) Oral daily  memantine 10 milliGRAM(s) Oral every 12 hours  methadone    Tablet 10 milliGRAM(s) Oral <User Schedule>  methadone    Tablet 15 milliGRAM(s) Oral <User Schedule>  methylphenidate 20 milliGRAM(s) Oral two times a day  octreotide  Injectable 100 MICROGram(s) SubCutaneous two times a day  pantoprazole    Tablet 40 milliGRAM(s) Oral every 12 hours  pregabalin 200 milliGRAM(s) Oral three times a day    MEDICATIONS  (PRN):  albuterol    90 MICROgram(s) HFA Inhaler 2 Puff(s) Inhalation every 6 hours PRN Shortness of Breath and/or Wheezing  diazepam    Tablet 2 milliGRAM(s) Oral at bedtime PRN insomnia  HYDROmorphone  Injectable 2 milliGRAM(s) IV Push every 3 hours PRN Severe Pain (7 - 10)  HYDROmorphone  Injectable 1 milliGRAM(s) IV Push every 4 hours PRN Breakthrough Pain  polyethylene glycol 3350 17 Gram(s) Oral daily PRN Constipation  simethicone 80 milliGRAM(s) Chew four times a day PRN Gas      Allergies    pertuzumab (Other (Severe))  Perjeta (Other (Severe))    Intolerances        SOCIAL HISTORY:          Occupation: RN    FAMILY HISTORY:  FH: CVA (cerebrovascular accident)        Vital Signs Last 24 Hrs  Vital Signs Last 24 Hrs  T(C): 36.7 (30 Sep 2024 08:27), Max: 36.7 (29 Sep 2024 16:05)  T(F): 98 (30 Sep 2024 08:27), Max: 98.1 (29 Sep 2024 16:05)  HR: 83 (30 Sep 2024 10:26) (73 - 99)  BP: 111/74 (30 Sep 2024 10:26) (98/65 - 111/74)  BP(mean): 87 (30 Sep 2024 10:26) (76 - 87)  RR: 20 (30 Sep 2024 10:26) (18 - 20)  SpO2: 96% (30 Sep 2024 10:26) (94% - 96%)    Parameters below as of 30 Sep 2024 10:26  Patient On (Oxygen Delivery Method): room air      T(C): 36.7 (29 Sep 2024 16:05), Max: 36.8 (29 Sep 2024 07:41)  T(F): 98.1 (29 Sep 2024 16:05), Max: 98.3 (29 Sep 2024 07:41)  HR: 99 (29 Sep 2024 16:05) (64 - 99)  BP: 98/65 (29 Sep 2024 16:05) (90/60 - 100/64)  BP(mean): 76 (29 Sep 2024 16:05) (76 - 76)  RR: 20 (29 Sep 2024 16:05) (18 - 20)  SpO2: 94% (29 Sep 2024 16:05) (94% - 100%)    Parameters below as of 29 Sep 2024 16:05  Patient On (Oxygen Delivery Method): room air        PHYSICAL EXAM:      Constitutional:WD adequately nourished female, awake, alert, uncomfortable; having EEG    Eyes:anicteric        Neck:Supple      Respiratory:Clear    Cardiovascular:RRR normal S1S2    Gastrointestinal:soft, non tender, pos BS      Extremities:no edema      Neurological:Awake, alert, spont speech ; mild left sided facial assymetry        LABS:                            10.1   4.52  )-----------( 165      ( 30 Sep 2024 04:10 )             33.0                       9.6    2.86  )-----------( 130      ( 29 Sep 2024 06:25 )             31.5     09-29    141  |  104  |  14.8  ----------------------------<  119[H]  4.0   |  28.0  |  0.61    Ca    8.3[L]      29 Sep 2024 06:25  Mg     2.0     09-29    TPro  5.2[L]  /  Alb  3.2[L]  /  TBili  0.8  /  DBili  x   /  AST  39[H]  /  ALT  19  /  AlkPhos  238[H]  09-28    PT/INR - ( 27 Sep 2024 20:55 )   PT: 13.8 sec;   INR: 1.19 ratio         PTT - ( 27 Sep 2024 20:55 )  PTT:39.7 sec  Urinalysis Basic - ( 29 Sep 2024 06:25 )    Color: x / Appearance: x / SG: x / pH: x  Gluc: 119 mg/dL / Ketone: x  / Bili: x / Urobili: x   Blood: x / Protein: x / Nitrite: x   Leuk Esterase: x / RBC: x / WBC x   Sq Epi: x / Non Sq Epi: x / Bacteria: x        RADIOLOGY & ADDITIONAL STUDIES:

## 2024-09-30 NOTE — CONSULT NOTE ADULT - ASSESSMENT
39 y/o female with h/o metastatic breast cancer , mets to brain came to the er for lt. facial  numbness and droop which started around 6 pm on the day of ER visit (9/27/24)  triage note at 20: 56 pm. code stroke called by ER. pt. stated that she felt mild LLE weakness as well. no other neuro deficits reported. no HA, no cp, no sob. no syncope or near syncope. still feels some facial numbness on left, less than before.  pt. reports that her last chemo was about 3-4 months ago , follows with [facility]. plan to restart chemo was held as her Hb dropped to 6.8 and pt. was at our hospital 3 days ago for blood transfusion and got 2 units. Brain scans upon admission including MRI with contrast reveal new metastases and areas of hemorrhage and areas suspicious for leptomeningeal spread.    Records of palliative radiotherapy to the brain reviewed and include whole brain to 3000cGy completed 7/29/22, SRS to left thalamus lesion, 3000cGy / 5 fx completing 10/4/2023  ,  and CyberKnife to 7 foci completed 4/12/2024.    Given multitude of previous intracranial irradiation, it is difficult to justify the risk of morbidity to the functional brain of further palliative irradiation , which is likely to result in significant morbidity with little benefit. If patient truly has leptomeningeal spread of her intracranial malignancy, whole brain and meninges irradiation would be the only option for this modality and would again result in significant neurological deficit . Would rather consider IT chemotherapy (MTX?) if LP indeed demonstrates true leptomeningeal involvement. If however additional intracranial SRS is contemplated , I recommend this be done at [facility] where detailed radiotherapy records can be brought to bare to perform retreatment most safely and effectively.    I have discussed these considerations at length with the patient who appears to understands . I believe I have answered all of her questions and concerns and she is currently leaning towards IT chemotherapy if LP is positive for leptomeningeal involvement.    Meanwhile , agree with Decadron for intracranial vasogenic edema related neurological sequelae.

## 2024-10-01 LAB
ANION GAP SERPL CALC-SCNC: 11 MMOL/L — SIGNIFICANT CHANGE UP (ref 5–17)
BUN SERPL-MCNC: 15.8 MG/DL — SIGNIFICANT CHANGE UP (ref 8–20)
CALCIUM SERPL-MCNC: 7.7 MG/DL — LOW (ref 8.4–10.5)
CHLORIDE SERPL-SCNC: 103 MMOL/L — SIGNIFICANT CHANGE UP (ref 96–108)
CO2 SERPL-SCNC: 26 MMOL/L — SIGNIFICANT CHANGE UP (ref 22–29)
CREAT SERPL-MCNC: 0.44 MG/DL — LOW (ref 0.5–1.3)
EGFR: 128 ML/MIN/1.73M2 — SIGNIFICANT CHANGE UP
GLUCOSE SERPL-MCNC: 154 MG/DL — HIGH (ref 70–99)
HCT VFR BLD CALC: 32.1 % — LOW (ref 34.5–45)
HGB BLD-MCNC: 9.7 G/DL — LOW (ref 11.5–15.5)
MCHC RBC-ENTMCNC: 29.8 PG — SIGNIFICANT CHANGE UP (ref 27–34)
MCHC RBC-ENTMCNC: 30.2 GM/DL — LOW (ref 32–36)
MCV RBC AUTO: 98.5 FL — SIGNIFICANT CHANGE UP (ref 80–100)
PLATELET # BLD AUTO: 164 K/UL — SIGNIFICANT CHANGE UP (ref 150–400)
POTASSIUM SERPL-MCNC: 3.9 MMOL/L — SIGNIFICANT CHANGE UP (ref 3.5–5.3)
POTASSIUM SERPL-SCNC: 3.9 MMOL/L — SIGNIFICANT CHANGE UP (ref 3.5–5.3)
RBC # BLD: 3.26 M/UL — LOW (ref 3.8–5.2)
RBC # FLD: 17.2 % — HIGH (ref 10.3–14.5)
SODIUM SERPL-SCNC: 140 MMOL/L — SIGNIFICANT CHANGE UP (ref 135–145)
WBC # BLD: 7.2 K/UL — SIGNIFICANT CHANGE UP (ref 3.8–10.5)
WBC # FLD AUTO: 7.2 K/UL — SIGNIFICANT CHANGE UP (ref 3.8–10.5)

## 2024-10-01 PROCEDURE — 99233 SBSQ HOSP IP/OBS HIGH 50: CPT

## 2024-10-01 RX ADMIN — Medication 80 MILLIGRAM(S): at 20:53

## 2024-10-01 RX ADMIN — HYDROMORPHONE HYDROCHLORIDE 30 MILLILITER(S): 1 INJECTION, SOLUTION INTRAMUSCULAR; INTRAVENOUS; SUBCUTANEOUS at 16:56

## 2024-10-01 RX ADMIN — PREGABALIN 200 MILLIGRAM(S): 25 CAPSULE ORAL at 14:04

## 2024-10-01 RX ADMIN — METHADONE HYDROCHLORIDE 15 MILLIGRAM(S): 10 TABLET ORAL at 20:52

## 2024-10-01 RX ADMIN — Medication 20 MILLIGRAM(S): at 06:50

## 2024-10-01 RX ADMIN — Medication 4 MILLIGRAM(S): at 23:54

## 2024-10-01 RX ADMIN — Medication 4 MILLIGRAM(S): at 00:35

## 2024-10-01 RX ADMIN — Medication 20 MILLIGRAM(S): at 12:11

## 2024-10-01 RX ADMIN — HYDROMORPHONE HYDROCHLORIDE 30 MILLILITER(S): 1 INJECTION, SOLUTION INTRAMUSCULAR; INTRAVENOUS; SUBCUTANEOUS at 07:43

## 2024-10-01 RX ADMIN — PREGABALIN 200 MILLIGRAM(S): 25 CAPSULE ORAL at 20:53

## 2024-10-01 RX ADMIN — MEMANTINE 10 MILLIGRAM(S): 10 TABLET ORAL at 17:16

## 2024-10-01 RX ADMIN — OCTREOTIDE ACETATE 100 MICROGRAM(S): 50 INJECTION, SOLUTION INTRAVENOUS; SUBCUTANEOUS at 18:22

## 2024-10-01 RX ADMIN — PREGABALIN 200 MILLIGRAM(S): 25 CAPSULE ORAL at 05:59

## 2024-10-01 RX ADMIN — CHLORHEXIDINE GLUCONATE ORAL RINSE 1 APPLICATION(S): 1.2 SOLUTION DENTAL at 12:10

## 2024-10-01 RX ADMIN — HYDROMORPHONE HYDROCHLORIDE 30 MILLILITER(S): 1 INJECTION, SOLUTION INTRAMUSCULAR; INTRAVENOUS; SUBCUTANEOUS at 19:24

## 2024-10-01 RX ADMIN — METHADONE HYDROCHLORIDE 10 MILLIGRAM(S): 10 TABLET ORAL at 05:58

## 2024-10-01 RX ADMIN — METHADONE HYDROCHLORIDE 10 MILLIGRAM(S): 10 TABLET ORAL at 14:04

## 2024-10-01 RX ADMIN — OCTREOTIDE ACETATE 100 MICROGRAM(S): 50 INJECTION, SOLUTION INTRAVENOUS; SUBCUTANEOUS at 06:51

## 2024-10-01 RX ADMIN — Medication 4 MILLIGRAM(S): at 05:58

## 2024-10-01 RX ADMIN — PANTOPRAZOLE SODIUM 40 MILLIGRAM(S): 40 TABLET, DELAYED RELEASE ORAL at 17:16

## 2024-10-01 RX ADMIN — MEMANTINE 10 MILLIGRAM(S): 10 TABLET ORAL at 05:58

## 2024-10-01 RX ADMIN — Medication 4 MILLIGRAM(S): at 17:16

## 2024-10-01 RX ADMIN — FUROSEMIDE 40 MILLIGRAM(S): 10 INJECTION INTRAVENOUS at 05:59

## 2024-10-01 RX ADMIN — Medication 4 MILLIGRAM(S): at 12:11

## 2024-10-01 RX ADMIN — PANTOPRAZOLE SODIUM 40 MILLIGRAM(S): 40 TABLET, DELAYED RELEASE ORAL at 05:58

## 2024-10-01 RX ADMIN — DIAZEPAM 2 MILLIGRAM(S): 10 TABLET ORAL at 23:55

## 2024-10-01 NOTE — PROGRESS NOTE ADULT - SUBJECTIVE AND OBJECTIVE BOX
Greta Lazar MD (Available on [employer])  [facility] Division of Hospital Medicine    Chief Complaint:  facial numbness     SUBJECTIVE / OVERNIGHT EVENTS:  Pt seen and examined at bedside. Denies any complaints. Asking for Emend for her nausea.    Patient denies chest pain, SOB, abd pain, N/V, fever, chills, dysuria or any other complaints. All remainder ROS negative.     INTERVAL EVENTS:  -Pt hemodynamically stable with no overnight events  -[place] was [place] for [place] methotrexate and LP. Per them ok for LP for now in order to assess LP extent   -Appreciate rad onc consult recs, hemeonc recs. Spoke to Nehal [facility]. suggests doing CT cervical, thoracic and lumber spine to assess for LP spread of diease. If positive pt will need LP  -If scans positive, will consult IR for LP   -On PCA pump. pt requesting Emened for her nausea its a chemo induced anti-emetic. [facility]       MEDICATIONS  (STANDING):  chlorhexidine 2% Cloths 1 Application(s) Topical daily  dexAMETHasone     Tablet 4 milliGRAM(s) Oral every 6 hours  FLUoxetine 80 milliGRAM(s) Oral at bedtime  furosemide    Tablet 40 milliGRAM(s) Oral daily  HYDROmorphone PCA (1 mG/mL) 30 milliLiter(s) PCA Continuous PCA Continuous  memantine 10 milliGRAM(s) Oral every 12 hours  methadone    Tablet 15 milliGRAM(s) Oral <User Schedule>  methadone    Tablet 10 milliGRAM(s) Oral <User Schedule>  methylphenidate 20 milliGRAM(s) Oral <User Schedule>  methylphenidate 20 milliGRAM(s) Oral <User Schedule>  octreotide  Injectable 100 MICROGram(s) SubCutaneous two times a day  pantoprazole    Tablet 40 milliGRAM(s) Oral every 12 hours  pregabalin 200 milliGRAM(s) Oral three times a day    MEDICATIONS  (PRN):  albuterol    90 MICROgram(s) HFA Inhaler 2 Puff(s) Inhalation every 6 hours PRN Shortness of Breath and/or Wheezing  diazepam    Tablet 2 milliGRAM(s) Oral at bedtime PRN insomnia  HYDROmorphone  Injectable 2 milliGRAM(s) IV Push every 3 hours PRN Severe Pain (7 - 10)  HYDROmorphone  Injectable 1 milliGRAM(s) IV Push every 4 hours PRN Breakthrough Pain  naloxone Injectable 0.1 milliGRAM(s) IV Push every 3 minutes PRN For ANY of the following changes in patient status:  A. RR LESS THAN 10 breaths per minute, B. Oxygen saturation LESS THAN 90%, C. Sedation score of 6  ondansetron Injectable 4 milliGRAM(s) IV Push every 6 hours PRN Nausea  polyethylene glycol 3350 17 Gram(s) Oral daily PRN Constipation  simethicone 80 milliGRAM(s) Chew four times a day PRN Gas        I&O's Summary    30 Sep 2024 07:01  -  01 Oct 2024 07:00  --------------------------------------------------------  IN: 640 mL / OUT: 850 mL / NET: -210 mL        PHYSICAL EXAM:  Vital Signs Last 24 Hrs  T(C): 36.6 (01 Oct 2024 07:23), Max: 36.7 (30 Sep 2024 20:52)  T(F): 97.9 (01 Oct 2024 07:23), Max: 98 (30 Sep 2024 20:52)  HR: 62 (01 Oct 2024 07:23) (62 - 98)  BP: 104/60 (01 Oct 2024 08:00) (87/58 - 110/71)  BP(mean): 84 (30 Sep 2024 20:52) (71 - 85)  RR: 18 (01 Oct 2024 07:23) (18 - 20)  SpO2: 93% (01 Oct 2024 07:23) (93% - 94%)    Parameters below as of 01 Oct 2024 07:23  Patient On (Oxygen Delivery Method): room air            CONSTITUTIONAL: NAD  ENMT: Moist oral mucosa  RESPIRATORY: Normal respiratory effort; lungs are clear to auscultation bilaterally  CARDIOVASCULAR: Regular rate and rhythm, normal S1 and S2, no murmur/rub/gallop  ABDOMEN: Nontender to palpation, normoactive bowel sounds, no rebound/guarding; No hepatosplenomegaly  EXTREMITIES: No LE swelling   PSYCH: A+O to person, place, and time; affect appropriate  NEUROLOGY: CN 2-12 are intact and symmetric; no gross sensory deficits;   SKIN: No rashes; no palpable lesions    LABS:                        9.7    7.20  )-----------( 164      ( 01 Oct 2024 05:24 )             32.1     10-01    140  |  103  |  15.8  ----------------------------<  154[H]  3.9   |  26.0  |  0.44[L]    Ca    7.7[L]      01 Oct 2024 05:24  Mg     2.0     09-30            Urinalysis Basic - ( 01 Oct 2024 05:24 )    Color: x / Appearance: x / SG: x / pH: x  Gluc: 154 mg/dL / Ketone: x  / Bili: x / Urobili: x   Blood: x / Protein: x / Nitrite: x   Leuk Esterase: x / RBC: x / WBC x   Sq Epi: x / Non Sq Epi: x / Bacteria: x        CAPILLARY BLOOD GLUCOSE            RADIOLOGY & ADDITIONAL TESTS:  Results Reviewed:   Imaging Personally Reviewed:  Electrocardiogram Personally Reviewed:

## 2024-10-01 NOTE — PROGRESS NOTE ADULT - ASSESSMENT
37 y/o female with h/o metastatic breast cancer , mets to brain presents to [facility] ER for new onset facial  numbness and droop admitted for stroke work up. MRI brain shows new hemorrhagic mets. Pain management consulted for pain management.      dPCA 0/0.3/8/6    cont home methadone 10/10//15        when due for discharge:  DC PCA  - Recommend starting dilaudid PO 4mg/8mg [id number] PRN mod/severe pain

## 2024-10-01 NOTE — PROGRESS NOTE ADULT - SUBJECTIVE AND OBJECTIVE BOX
Patient is a 38y old  Female who presented with a chief complaint of Left facial numbness.    HPI:  38F PMH metastatic breast ca to liver, lung, brain, bone presented with left facial numbness. Pt is under the care of Dr. Chapa & was undergoing chemotherapy which was ultimately stopped d/t a drop in her hemoglobin. She states that on Friday she developed L facial numbness, L sided weakness, dysarthria, & word finding difficulty. She also states that she's had HAs & diplopia for a few months. She feels that her speech is slurred & "short."        PAST MEDICAL & SURGICAL HISTORY:  H/O compression fracture of spine      Anxiety      Metastatic breast cancer      H/O pleural effusion      Pericardial effusion      S/P tonsillectomy      H/O chest tube placement  12/23/21      S/P pericardiocentesis  12/28/21        FAMILY HISTORY:  FH: CVA (cerebrovascular accident)    Allergies    pertuzumab (Other (Severe))  Perjeta (Other (Severe))    Intolerances    Vital Signs Last 24 Hrs  T(C): 36.6 (01 Oct 2024 07:23), Max: 36.7 (30 Sep 2024 20:52)  T(F): 97.9 (01 Oct 2024 07:23), Max: 98 (30 Sep 2024 20:52)  HR: 62 (01 Oct 2024 07:23) (62 - 98)  BP: 104/60 (01 Oct 2024 08:00) (87/58 - 111/74)  BP(mean): 84 (30 Sep 2024 20:52) (71 - 87)  RR: 18 (01 Oct 2024 07:23) (18 - 20)  SpO2: 93% (01 Oct 2024 07:23) (93% - 96%)    Parameters below as of 01 Oct 2024 07:23  Patient On (Oxygen Delivery Method): room air      PHYSICAL EXAM:  GENERAL: NAD  HEAD: vEEG leads in place, normocephalic  EYES: Conjunctiva and sclera clear; corneal reflex intact  ENMT: Moist mucous membranes  NECK: Supple, no JVD  MENTAL STATUS: AAO x3; Opens eyes spontaneously; Dysarthric without aphasia; following simple commands  CRANIAL NERVES: PERRL. EOMI without nystagmus. Decreased L facial sensation. Mild L facial droop. Hearing grossly intact. Speech clear. Head turning and shoulder shrug intact.   MOTOR: RUE 5/5; RLE 5/5; LUE 5-/5; LLE 4+/5  SENSATION: Decreased sensation in LUE/LLE  COORDINATION: No upper extremity dysmetria      LABS:                          9.7    7.20  )-----------( 164      ( 01 Oct 2024 05:24 )             32.1   10-01    140  |  103  |  15.8  ----------------------------<  154[H]  3.9   |  26.0  |  0.44[L]    Ca    7.7[L]      01 Oct 2024 05:24  Mg     2.0     09-30        Urinalysis Basic - ( 30 Sep 2024 04:10 )    Color: x / Appearance: x / SG: x / pH: x  Gluc: 163 mg/dL / Ketone: x  / Bili: x / Urobili: x   Blood: x / Protein: x / Nitrite: x   Leuk Esterase: x / RBC: x / WBC x   Sq Epi: x / Non Sq Epi: x / Bacteria: x        CULTURES:  Culture Results:   <10,000 CFU/mL Normal Urogenital Stacie (09-27 @ 23:28)      RADIOLOGY & ADDITIONAL STUDIES:  MR Head w/wo IV Cont 09.28.24:  IMPRESSION:    Left parasagittal frontoparietal and bilateral cerebellar hemisphere   metastatic parenchymal lesions as detailed in the body the report.    Additional abnormal leptomeningeal enhancement in the right and possibly   left cerebellar hemispheres.    --- End of Report ---

## 2024-10-01 NOTE — PROGRESS NOTE ADULT - SUBJECTIVE AND OBJECTIVE BOX
CC: Patient being seen for rehabilitation follow up.  Patient feeling ok.  Ambulating out of bathroom.  Able to manage a bit of the IV pole/cords, but needed assist for safety.       FUNCTIONAL PROGRESS  9/30 SLP  · Diagnosis	Mild dysarthria  · Patient/Family Goals Statement	"my speech is slurred & sometimes I do have trouble saying what I want to"  · Criteria for Skilled Therapeutic Interventions Met	skilled criteria for intervention met  · Therapy Frequency	as schedule permits  · Comments	Speech tx services RX following discharge: case management aware    Behavioral Exam:   · Behavioral Observations	alert  · Orientation	intact    Auditory Comprehension:   · Answers Yes/No Questions	within functional limits  · Able to Follow Commands	within functional limits  · Discourse	intact  · Body Part ID	intact  · Open Ended Questions	intact    Verbal Expression:   · Confrontational Naming	intact  · Responsive Naming	intact  · Conversational Speech	Fluent, appropriate syntax & semantics, some hesitations/pauses noted in running speech however functional t/o assessment    Cognitive Linguistic Status Examination:   · Diffuse Language Characteristics	no    Motor Speech:   · Articulation	imprecise  · Prosody	impaired  · Comments	Mildly reduced speech intelligibility in conversation/running speech with varied articulatory imprecision & prosody (pt with good awareness). Pt educated re: tasks to practice      VITALS  T(C): 36.6 (10-01-24 @ 07:23), Max: 36.7 (09-30-24 @ 20:52)  HR: 62 (10-01-24 @ 07:23) (62 - 98)  BP: 104/60 (10-01-24 @ 08:00) (87/58 - 110/71)  RR: 18 (10-01-24 @ 07:23) (18 - 20)  SpO2: 93% (10-01-24 @ 07:23) (93% - 94%)  Wt(kg): --    MEDICATIONS   albuterol    90 MICROgram(s) HFA Inhaler 2 Puff(s) every 6 hours PRN  chlorhexidine 2% Cloths 1 Application(s) daily  dexAMETHasone     Tablet 4 milliGRAM(s) every 6 hours  diazepam    Tablet 2 milliGRAM(s) at bedtime PRN  FLUoxetine 80 milliGRAM(s) at bedtime  furosemide    Tablet 40 milliGRAM(s) daily  HYDROmorphone  Injectable 2 milliGRAM(s) every 3 hours PRN  HYDROmorphone  Injectable 1 milliGRAM(s) every 4 hours PRN  HYDROmorphone PCA (1 mG/mL) 30 milliLiter(s) PCA Continuous  memantine 10 milliGRAM(s) every 12 hours  methadone    Tablet 15 milliGRAM(s) <User Schedule>  methadone    Tablet 10 milliGRAM(s) <User Schedule>  methylphenidate 20 milliGRAM(s) <User Schedule>  methylphenidate 20 milliGRAM(s) <User Schedule>  naloxone Injectable 0.1 milliGRAM(s) every 3 minutes PRN  octreotide  Injectable 100 MICROGram(s) two times a day  ondansetron Injectable 4 milliGRAM(s) every 6 hours PRN  pantoprazole    Tablet 40 milliGRAM(s) every 12 hours  polyethylene glycol 3350 17 Gram(s) daily PRN  pregabalin 200 milliGRAM(s) three times a day  simethicone 80 milliGRAM(s) four times a day PRN      RECENT LABS/IMAGING  - Reviewed Today                        9.7    7.20  )-----------( 164      ( 01 Oct 2024 05:24 )             32.1     10-01    140  |  103  |  15.8  ----------------------------<  154[H]  3.9   |  26.0  |  0.44[L]    Ca    7.7[L]      01 Oct 2024 05:24  Mg     2.0     09-30        Urinalysis Basic - ( 01 Oct 2024 05:24 )    Color: x / Appearance: x / SG: x / pH: x  Gluc: 154 mg/dL / Ketone: x  / Bili: x / Urobili: x   Blood: x / Protein: x / Nitrite: x   Leuk Esterase: x / RBC: x / WBC x   Sq Epi: x / Non Sq Epi: x / Bacteria: x          MRI HEAD 9/28 - Left parasagittal frontoparietal and bilateral cerebellar hemisphere metastatic parenchymal lesions as detailed in the body the report. Additional abnormal leptomeningeal enhancement in the right and possibly left cerebellar hemispheres.    EEG 9/29 - Normal EEG study in the awake / drowsy / asleep states. There were no epileptiform abnormalities recorded.        ----------------------------------------------------------------------------------------  PHYSICAL EXAM  Constitutional - NAD, Comfortable  Neurologic Exam -                    Cognitive - AAO to self, place, date, year, situation     Communication - Mild dysarthria       Cranial Nerves - Left lip droop     FUNCTIONAL MOTOR EXAM -                     LEFT    UE - ShAB 5/5, EF 5/5, EE 5/5, WE 5/5,  5/5                    RIGHT UE - ShAB 5/5, EF 5/5, EE 5/5, WE 5/5,  5/5                    LEFT    LE - HF 3/5, KE 4/5, DF 5/5, PF 5/5                    RIGHT LE - HF 5/5, KE 5/5, DF 5/5, PF 5/5        Sensory - Intact to LT   Psychiatric - Fatigued, hopeful   ----------------------------------------------------------------------------------------  ASSESSMENT/PLAN  38yFemale with functional deficits related to 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  Right cerebellar edema, Bilateral cerebellar parenchymal lesions/leptomeningeal enhancement - Decadron, Pending LP for intrathecal methotrexate   New hemorrhagic brain metastasis, cerebral edema - dexamethasone  Executive Dysfunction - Continue Ritalin, Continue Namenda  Adjustment Mood D/O - Prozac, Recommend Valium and Xanax PRN  Chronic Cancer Pain/Headaches - Continue Dilaudid IV/PCA, Continue Methadone, Lyrica,   DVT PPX - SCDs  Rehab/Impaired mobility and function - Patient continues to require hospitalization for the above diagnoses and ongoing active management of comorbid complications that are substantially posing a threat to bodily function, functional ability and quality of life.     Patient remains functionally at a modified independent level for DC HOME with HOME CARE. However, patient pending ongoing treatment/workup that may require possible other options.     Will continue to follow. Rehab recommendations are dependent on how functional progress changes as well as how patient continues to participate and tolerate therapeutic interventions, WHICH MAY CHANGE UPON FOLLOW UP EXAMINATIONS. Recommend ongoing mobilization by staff to maintain cardiopulmonary function and prevention of secondary complications related to debility/immobility. Have discussed the specific rehabilitation management and recommendations above with Jumana SEQUEIRA.

## 2024-10-01 NOTE — PROGRESS NOTE ADULT - ASSESSMENT
Assessment	  Assessment/Plan:  37 y/o female with h/o metastatic breast cancer , mets to brain came to the er for lt. facial  numbness and droop  admitted for stroke work up. MRI brain with new hemorrhagic mets.       #Lt facial numbness  #MRI brain with new hemorrhagic Metastasis  #Stroke ruled out  CT scan studies for stroke no acute findings.   follow mri w/contrast> as above  neuro checks.   follow lipid panel and statin plan per findings, ct studies no acute findings for stroke.   recent echo from july 2024, EF 55-60 %  Holding Asa and lovenox  neuro consulted  Heme/onc consulted.   Rad onc consulted. recs to do LP to assess for IT mtx     #Hx of Metastatic Breast cancer  -pt follows with Dr Chapa at [facility]  -Hemeonc consult placed   -[place] on board  -Rad onc on board  -Pending CT cervical, thoracic and lumber spine to assess for LP spread   -Pain mx consulted. On PCA pump    #Hypokalemia: Now resolved   -Monitor BMP    #Anxiety  #Insomnia  -continue pt's prozac  -Valium at bedtime     #Normocytic Anemia likely AOCD  -Today Hb 10.2, anemia in the setting of metastatic breast cancer. got 2 units of prbc 3 days ago for Hb 6.8.  -Check iron panel in AM. Normal Iron, elevated Ferritin hence AOCD    Dispo: medically acute.

## 2024-10-01 NOTE — PROGRESS NOTE ADULT - SUPERVISING ATTENDING
I personally reviewed the patient's imaging. History and plan discussed with JOSE Sow, agree with above.

## 2024-10-01 NOTE — PROGRESS NOTE ADULT - ASSESSMENT
38F PMH metastatic breast ca to liver, lung, brain, bone presented with left facial numbness. Pt is under the care of Dr. Chapa & was undergoing chemotherapy which was ultimately stopped d/t a drop in her hemoglobin. She states that on Friday she developed L facial numbness, L sided weakness, dysarthria, & word finding difficulty.   -MRI brain shows B/L cerebellar & L frontoparietal metastatic lesions & B/L cerebellar leptomeningeal enhancement    Plan:  -Q4h neuro checks   -Ok from a NSG standpoint to undergo LP for intrathecal methotrexate  -Heme/onc following; appreciate recs, considering outpt RT  -Pain control PRN  -Neurology following; vEEG shows no seizures today   -Further medical mgmt per primary team  -D/w Dr. Rosas, MILI to sign off, reconsult as needed

## 2024-10-01 NOTE — PROGRESS NOTE ADULT - SUBJECTIVE AND OBJECTIVE BOX
Interval Hx:  Patient seen during rounds  Patient reports pain to be controlled on current medications  Patient denies sedation with medications        Analgesic Dosing for past 24 hours reviewed as below:    FLUoxetine   80 milliGRAM(s) Oral (09-30-24 @ 20:58)    HYDROmorphone  Injectable   2 milliGRAM(s) IV Push (09-30-24 @ 13:30)   2 milliGRAM(s) IV Push (09-30-24 @ 10:23)    HYDROmorphone  Injectable   1 milliGRAM(s) IV Push (09-30-24 @ 15:19)    memantine   10 milliGRAM(s) Oral (10-01-24 @ 05:58)   10 milliGRAM(s) Oral (09-30-24 @ 17:11)    methadone    Tablet   15 milliGRAM(s) Oral (09-30-24 @ 20:58)    methadone    Tablet   10 milliGRAM(s) Oral (10-01-24 @ 05:58)   10 milliGRAM(s) Oral (09-30-24 @ 13:27)    methylphenidate   20 milliGRAM(s) Oral (10-01-24 @ 06:50)    ondansetron Injectable   4 milliGRAM(s) IV Push (09-30-24 @ 18:15)    pregabalin   200 milliGRAM(s) Oral (10-01-24 @ 05:59)   200 milliGRAM(s) Oral (09-30-24 @ 20:58)   200 milliGRAM(s) Oral (09-30-24 @ 13:27)          T(C): 36.6 (10-01-24 @ 07:23), Max: 36.7 (09-30-24 @ 20:52)  HR: 62 (10-01-24 @ 07:23) (62 - 98)  BP: 104/60 (10-01-24 @ 08:00) (87/58 - 111/74)  RR: 18 (10-01-24 @ 07:23) (18 - 20)  SpO2: 93% (10-01-24 @ 07:23) (93% - 96%)      09-30-24 @ 07:01  -  10-01-24 @ 07:00  --------------------------------------------------------  IN: 640 mL / OUT: 850 mL / NET: -210 mL        albuterol    90 MICROgram(s) HFA Inhaler 2 Puff(s) Inhalation every 6 hours PRN  chlorhexidine 2% Cloths 1 Application(s) Topical daily  dexAMETHasone     Tablet 4 milliGRAM(s) Oral every 6 hours  diazepam    Tablet 2 milliGRAM(s) Oral at bedtime PRN  FLUoxetine 80 milliGRAM(s) Oral at bedtime  furosemide    Tablet 40 milliGRAM(s) Oral daily  HYDROmorphone  Injectable 1 milliGRAM(s) IV Push every 4 hours PRN  HYDROmorphone  Injectable 2 milliGRAM(s) IV Push every 3 hours PRN  HYDROmorphone PCA (1 mG/mL) 30 milliLiter(s) PCA Continuous PCA Continuous  memantine 10 milliGRAM(s) Oral every 12 hours  methadone    Tablet 15 milliGRAM(s) Oral <User Schedule>  methadone    Tablet 10 milliGRAM(s) Oral <User Schedule>  methylphenidate 20 milliGRAM(s) Oral <User Schedule>  methylphenidate 20 milliGRAM(s) Oral <User Schedule>  naloxone Injectable 0.1 milliGRAM(s) IV Push every 3 minutes PRN  octreotide  Injectable 100 MICROGram(s) SubCutaneous two times a day  ondansetron Injectable 4 milliGRAM(s) IV Push every 6 hours PRN  pantoprazole    Tablet 40 milliGRAM(s) Oral every 12 hours  polyethylene glycol 3350 17 Gram(s) Oral daily PRN  pregabalin 200 milliGRAM(s) Oral three times a day  simethicone 80 milliGRAM(s) Chew four times a day PRN                          9.7    7.20  )-----------( 164      ( 01 Oct 2024 05:24 )             32.1     10-01    140  |  103  |  15.8  ----------------------------<  154[H]  3.9   |  26.0  |  0.44[L]    Ca    7.7[L]      01 Oct 2024 05:24  Mg     2.0     09-30        Urinalysis Basic - ( 01 Oct 2024 05:24 )    Color: x / Appearance: x / SG: x / pH: x  Gluc: 154 mg/dL / Ketone: x  / Bili: x / Urobili: x   Blood: x / Protein: x / Nitrite: x   Leuk Esterase: x / RBC: x / WBC x   Sq Epi: x / Non Sq Epi: x / Bacteria: x        Pain Service   [phone]

## 2024-10-01 NOTE — PROGRESS NOTE ADULT - ASSESSMENT
37yo F who follows DR Chapa at [facility] for a history of metastatic breast cancer, ER/IN neg, Her-2 pos, originally diagnosed 10/21, treated with Taxane/Herceptin, Enhertu, now plan for Kadcyla, on hold because of anemia.  Has known bone, liver, brain mets; has had radiation  Admitted with headache, left fail numbness, mild LLE weakness.  Had MRI with contrast that showed extensive disease, with left parasagittal, fronto parietal, bilateral hemispheric lesions, bilateral leptomeningeal enhancement.  Currently c/o significant headache, despite Decadron and Dilaudid and Methadone.      BREAST CANCER  - follows Dr Chapa at [facility]  - Widely met Breast cancer to liver, bone, brain  -  Admitted with left sided facial numbness  - MRI of brain with contrast shows sign disease- will need to compare with outpt studies  - Per Dr Chapa- will get CT spine cervical thoracic and lumbar with contrast to R/O Lepto  I will discuss with outpt  RT; concern in re:  leptomeningeal enhancement bilateral cerebellar  - Neurology following No evidence of stroke on MRI. Will discontinue c-EEG.   - Neurosurg consult requested for possible LP; candidate for IT MTX if se has leptomeningeal disease  - Neurosurgery following- -Ok from a NSG standpoint to undergo LP for intrathecal methotrexate  - pain management     Case discussed with RT-will evaluate and touch base with outpt RT    Will follow 37yo F who follows DR Chapa at [facility] for a history of metastatic breast cancer, ER/OH neg, Her-2 pos, originally diagnosed 10/21, treated with Taxane/Herceptin, Enhertu, now plan for Kadcyla, on hold because of anemia.  Has known bone, liver, brain mets; has had radiation  Admitted with headache, left fail numbness, mild LLE weakness.  Had MRI with contrast that showed extensive disease, with left parasagittal, fronto parietal, bilateral hemispheric lesions, bilateral leptomeningeal enhancement.  Currently c/o significant headache, despite Decadron and Dilaudid and Methadone.      BREAST CANCER  - follows Dr Chapa at [facility]  - Widely met Breast cancer to liver, bone, brain  -  Admitted with left sided facial numbness  - MRI of brain with contrast shows sign disease- will need to compare with outpt studies  - Per Dr Chapa- will get CT spine cervical thoracic and lumbar with contrast to R/O Lepto  I will discuss with outpt  RT; concern in re:  leptomeningeal enhancement bilateral cerebellar  - Neurology following No evidence of stroke on MRI. Will discontinue c-EEG.   - Neurosurg consult requested for possible LP; candidate for IT MTX if se has leptomeningeal disease  - Neurosurgery following- -Ok from a NSG standpoint to undergo LP for intrathecal methotrexate  - pain management - on PCA  - OK to use Port   - Recommend Zofran for nausea PRN    DR Rivas discussed with RT-will evaluate and touch base with outpt RT    Will follow

## 2024-10-01 NOTE — PROGRESS NOTE ADULT - SUBJECTIVE AND OBJECTIVE BOX
38y Female seen on consultation for the evaluation and management of metastatic breast cancer, ER/MA neg, Her-2 pos, originally diagnosed 10/21, treated with Taxane/Herceptin, Enhertu, now receiving treatment with Kadcyla, on hold because of anemia.  Has known bone, liver, brain mets; has had radiation  Admitted with headache, left fail numbness, mild LLE weakness.  Had HRI with contrast that showed extensive disease, with left parasagittal, fronto parietal, bilateral hemispheric lesions, bilateral leptomeningeal enhancement.  Currently c/o significant headache, despite Decadron and Dilaudid and Methadone.  Denies c/o SOB, chest pain.  Has nausea.  Denies numbness tingling dysuria or hematuria    Reports anemia; has been requiring PRBC tx; Hb now 10.1 post transfusion  Reports headaches are better; awaiting pain management; no new neurologic deficits    PAST MEDICAL & SURGICAL HISTORY:  H/O compression fracture of spine  Anxiety  Metastatic breast cancer  H/O pleural effusion  Pericardial effusion  S/P tonsillectomy  H/O chest tube placement 12/23/21  S/P pericardiocentesis 12/28/21    Allergies  pertuzumab (Other (Severe))  Perjeta (Other (Severe))    MEDICATIONS  (STANDING):  chlorhexidine 2% Cloths 1 Application(s) Topical daily  dexAMETHasone     Tablet 4 milliGRAM(s) Oral every 6 hours  FLUoxetine 80 milliGRAM(s) Oral at bedtime  furosemide    Tablet 40 milliGRAM(s) Oral daily  memantine 10 milliGRAM(s) Oral every 12 hours  methadone    Tablet 10 milliGRAM(s) Oral <User Schedule>  methadone    Tablet 15 milliGRAM(s) Oral <User Schedule>  methylphenidate 20 milliGRAM(s) Oral two times a day  octreotide  Injectable 100 MICROGram(s) SubCutaneous two times a day  pantoprazole    Tablet 40 milliGRAM(s) Oral every 12 hours  pregabalin 200 milliGRAM(s) Oral three times a day    MEDICATIONS  (PRN):  albuterol    90 MICROgram(s) HFA Inhaler 2 Puff(s) Inhalation every 6 hours PRN Shortness of Breath and/or Wheezing  diazepam    Tablet 2 milliGRAM(s) Oral at bedtime PRN insomnia  HYDROmorphone  Injectable 2 milliGRAM(s) IV Push every 3 hours PRN Severe Pain (7 - 10)  HYDROmorphone  Injectable 1 milliGRAM(s) IV Push every 4 hours PRN Breakthrough Pain  polyethylene glycol 3350 17 Gram(s) Oral daily PRN Constipation  simethicone 80 milliGRAM(s) Chew four times a day PRN Gas        Vital Signs Last 24 Hrs  T(C): 36.6 (01 Oct 2024 07:23), Max: 36.7 (30 Sep 2024 20:52)  T(F): 97.9 (01 Oct 2024 07:23), Max: 98 (30 Sep 2024 20:52)  HR: 62 (01 Oct 2024 07:23) (62 - 98)  BP: 104/60 (01 Oct 2024 08:00) (87/58 - 111/74)  BP(mean): 84 (30 Sep 2024 20:52) (71 - 87)  RR: 18 (01 Oct 2024 07:23) (18 - 20)  SpO2: 93% (01 Oct 2024 07:23) (93% - 96%)    Parameters below as of 01 Oct 2024 07:23  Patient On (Oxygen Delivery Method): room air            PHYSICAL EXAM:  Constitutional:  awake, alert   Eyes: anicteric  Neck:Supple  Respiratory:Clear  Cardiovascular:RRR normal S1S2  Gastrointestinal: soft  Extremities:DENNEY  Neurological:Awake, alert, spont speech ; mild left sided facial assymetry        CBC                          9.7    7.20  )-----------( 164      ( 01 Oct 2024 05:24 )             32.1                           10.1   4.52  )-----------( 165      ( 30 Sep 2024 04:10 )             33.0                       9.6    2.86  )-----------( 130      ( 29 Sep 2024 06:25 )             31.5     CHEM  10-01    140  |  103  |  15.8  ----------------------------<  154[H]  3.9   |  26.0  |  0.44[L]    Ca    7.7[L]      01 Oct 2024 05:24  Mg     2.0     09-30      09-29    141  |  104  |  14.8  ----------------------------<  119[H]  4.0   |  28.0  |  0.61    Ca    8.3[L]      29 Sep 2024 06:25  Mg     2.0     09-29    TPro  5.2[L]  /  Alb  3.2[L]  /  TBili  0.8  /  DBili  x   /  AST  39[H]  /  ALT  19  /  AlkPhos  238[H]  09-28    PT/INR - ( 27 Sep 2024 20:55 )   PT: 13.8 sec;   INR: 1.19 ratio         PTT - ( 27 Sep 2024 20:55 )  PTT:39.7 sec  Urinalysis Basic - ( 29 Sep 2024 06:25 )    Color: x / Appearance: x / SG: x / pH: x  Gluc: 119 mg/dL / Ketone: x  / Bili: x / Urobili: x   Blood: x / Protein: x / Nitrite: x   Leuk Esterase: x / RBC: x / WBC x   Sq Epi: x / Non Sq Epi: x / Bacteria: x        RADIOLOGY & ADDITIONAL STUDIES: 38y Female seen on consultation for the evaluation and management of metastatic breast cancer, ER/FL neg, Her-2 pos, originally diagnosed 10/21, treated with Taxane/Herceptin, Enhertu, now receiving treatment with Kadcyla, on hold because of anemia.  Has known bone, liver, brain mets; has had radiation  Admitted with headache, left fail numbness, mild LLE weakness.  Had HRI with contrast that showed extensive disease, with left parasagittal, fronto parietal, bilateral hemispheric lesions, bilateral leptomeningeal enhancement.  Currently c/o significant headache, despite Decadron and Dilaudid and Methadone.  Denies c/o SOB, chest pain.  Has nausea.  Denies numbness tingling dysuria or hematuria    Reports anemia; has been requiring PRBC tx; Hb now 10.1 post transfusion  Reports headaches are better; awaiting pain management; no new neurologic deficits    PAST MEDICAL & SURGICAL HISTORY:  H/O compression fracture of spine  Anxiety  Metastatic breast cancer  H/O pleural effusion  Pericardial effusion  S/P tonsillectomy  H/O chest tube placement 12/23/21  S/P pericardiocentesis 12/28/21    Allergies  pertuzumab (Other (Severe))  Perjeta (Other (Severe))    MEDICATIONS  (STANDING):  chlorhexidine 2% Cloths 1 Application(s) Topical daily  dexAMETHasone     Tablet 4 milliGRAM(s) Oral every 6 hours  FLUoxetine 80 milliGRAM(s) Oral at bedtime  furosemide    Tablet 40 milliGRAM(s) Oral daily  memantine 10 milliGRAM(s) Oral every 12 hours  methadone    Tablet 10 milliGRAM(s) Oral <User Schedule>  methadone    Tablet 15 milliGRAM(s) Oral <User Schedule>  methylphenidate 20 milliGRAM(s) Oral two times a day  octreotide  Injectable 100 MICROGram(s) SubCutaneous two times a day  pantoprazole    Tablet 40 milliGRAM(s) Oral every 12 hours  pregabalin 200 milliGRAM(s) Oral three times a day    MEDICATIONS  (PRN):  albuterol    90 MICROgram(s) HFA Inhaler 2 Puff(s) Inhalation every 6 hours PRN Shortness of Breath and/or Wheezing  diazepam    Tablet 2 milliGRAM(s) Oral at bedtime PRN insomnia  HYDROmorphone  Injectable 2 milliGRAM(s) IV Push every 3 hours PRN Severe Pain (7 - 10)  HYDROmorphone  Injectable 1 milliGRAM(s) IV Push every 4 hours PRN Breakthrough Pain  polyethylene glycol 3350 17 Gram(s) Oral daily PRN Constipation  simethicone 80 milliGRAM(s) Chew four times a day PRN Gas    Vital Signs Last 24 Hrs  T(C): 36.3 (10-01-24 @ 12:00), Max: 36.7 (09-30-24 @ 20:52)  T(F): 97.4 (10-01-24 @ 12:00), Max: 98 (09-30-24 @ 20:52)  HR: 90 (10-01-24 @ 12:00) (62 - 98)  BP: 113/74 (10-01-24 @ 12:00) (87/58 - 113/74)  BP(mean): 84 (09-30-24 @ 20:52) (71 - 85)  RR: 18 (10-01-24 @ 12:00) (18 - 20)  SpO2: 98% (10-01-24 @ 12:00) (93% - 98%)      PHYSICAL EXAM:  Constitutional:  awake, alert   Eyes: anicteric  Neck:Supple  Respiratory:Clear  Cardiovascular:RRR normal S1S2  Gastrointestinal: soft  Extremities:DENNEY  Neurological:Awake, alert, spont speech ; mild left sided facial assymetry        CBC                          9.7    7.20  )-----------( 164      ( 01 Oct 2024 05:24 )             32.1                           10.1   4.52  )-----------( 165      ( 30 Sep 2024 04:10 )             33.0                       9.6    2.86  )-----------( 130      ( 29 Sep 2024 06:25 )             31.5     CHEM  10-01    140  |  103  |  15.8  ----------------------------<  154[H]  3.9   |  26.0  |  0.44[L]    Ca    7.7[L]      01 Oct 2024 05:24  Mg     2.0     09-30 09-29    141  |  104  |  14.8  ----------------------------<  119[H]  4.0   |  28.0  |  0.61    Ca    8.3[L]      29 Sep 2024 06:25  Mg     2.0     09-29    TPro  5.2[L]  /  Alb  3.2[L]  /  TBili  0.8  /  DBili  x   /  AST  39[H]  /  ALT  19  /  AlkPhos  238[H]  09-28    PT/INR - ( 27 Sep 2024 20:55 )   PT: 13.8 sec;   INR: 1.19 ratio         PTT - ( 27 Sep 2024 20:55 )  PTT:39.7 sec  Urinalysis Basic - ( 29 Sep 2024 06:25 )    Color: x / Appearance: x / SG: x / pH: x  Gluc: 119 mg/dL / Ketone: x  / Bili: x / Urobili: x   Blood: x / Protein: x / Nitrite: x   Leuk Esterase: x / RBC: x / WBC x   Sq Epi: x / Non Sq Epi: x / Bacteria: x        RADIOLOGY & ADDITIONAL STUDIES:

## 2024-10-02 LAB
ANION GAP SERPL CALC-SCNC: 15 MMOL/L — SIGNIFICANT CHANGE UP (ref 5–17)
APPEARANCE CSF: CLEAR — SIGNIFICANT CHANGE UP
BUN SERPL-MCNC: 14.2 MG/DL — SIGNIFICANT CHANGE UP (ref 8–20)
CALCIUM SERPL-MCNC: 7.4 MG/DL — LOW (ref 8.4–10.5)
CHLORIDE SERPL-SCNC: 103 MMOL/L — SIGNIFICANT CHANGE UP (ref 96–108)
CO2 SERPL-SCNC: 24 MMOL/L — SIGNIFICANT CHANGE UP (ref 22–29)
COLOR CSF: SIGNIFICANT CHANGE UP
CREAT SERPL-MCNC: 0.5 MG/DL — SIGNIFICANT CHANGE UP (ref 0.5–1.3)
CRYPTOC AG CSF-ACNC: NEGATIVE — SIGNIFICANT CHANGE UP
CSF PCR RESULT: SIGNIFICANT CHANGE UP
EGFR: 124 ML/MIN/1.73M2 — SIGNIFICANT CHANGE UP
GLUCOSE CSF-MCNC: 87 MG/DLG/24H — HIGH (ref 40–70)
GLUCOSE SERPL-MCNC: 137 MG/DL — HIGH (ref 70–99)
GRAM STN FLD: SIGNIFICANT CHANGE UP
GRAM STN FLD: SIGNIFICANT CHANGE UP
HCG UR QL: NEGATIVE — SIGNIFICANT CHANGE UP
HCT VFR BLD CALC: 34 % — LOW (ref 34.5–45)
HGB BLD-MCNC: 10.4 G/DL — LOW (ref 11.5–15.5)
MCHC RBC-ENTMCNC: 29.8 PG — SIGNIFICANT CHANGE UP (ref 27–34)
MCHC RBC-ENTMCNC: 30.6 GM/DL — LOW (ref 32–36)
MCV RBC AUTO: 97.4 FL — SIGNIFICANT CHANGE UP (ref 80–100)
NEUTROPHILS # CSF: SIGNIFICANT CHANGE UP % (ref 0–6)
NRBC NFR CSF: 1 /UL — SIGNIFICANT CHANGE UP (ref 0–5)
PLATELET # BLD AUTO: 151 K/UL — SIGNIFICANT CHANGE UP (ref 150–400)
POTASSIUM SERPL-MCNC: 3.8 MMOL/L — SIGNIFICANT CHANGE UP (ref 3.5–5.3)
POTASSIUM SERPL-SCNC: 3.8 MMOL/L — SIGNIFICANT CHANGE UP (ref 3.5–5.3)
PROT CSF-MCNC: 37 MG/DL — SIGNIFICANT CHANGE UP (ref 15–45)
RBC # BLD: 3.49 M/UL — LOW (ref 3.8–5.2)
RBC # CSF: 22 /CMM — HIGH (ref 0–1)
RBC # FLD: 16.8 % — HIGH (ref 10.3–14.5)
SODIUM SERPL-SCNC: 142 MMOL/L — SIGNIFICANT CHANGE UP (ref 135–145)
SPECIMEN SOURCE: SIGNIFICANT CHANGE UP
TUBE TYPE: SIGNIFICANT CHANGE UP
WBC # BLD: 4.85 K/UL — SIGNIFICANT CHANGE UP (ref 3.8–10.5)
WBC # FLD AUTO: 4.85 K/UL — SIGNIFICANT CHANGE UP (ref 3.8–10.5)

## 2024-10-02 PROCEDURE — 72149 MRI LUMBAR SPINE W/DYE: CPT | Mod: 26

## 2024-10-02 PROCEDURE — 72142 MRI NECK SPINE W/DYE: CPT | Mod: 26

## 2024-10-02 PROCEDURE — 99232 SBSQ HOSP IP/OBS MODERATE 35: CPT

## 2024-10-02 PROCEDURE — 72147 MRI CHEST SPINE W/DYE: CPT | Mod: 26

## 2024-10-02 PROCEDURE — 62270 DX LMBR SPI PNXR: CPT

## 2024-10-02 PROCEDURE — 99233 SBSQ HOSP IP/OBS HIGH 50: CPT

## 2024-10-02 PROCEDURE — 99233 SBSQ HOSP IP/OBS HIGH 50: CPT | Mod: 25

## 2024-10-02 RX ORDER — HYDROMORPHONE HYDROCHLORIDE 1 MG/ML
0.5 INJECTION, SOLUTION INTRAMUSCULAR; INTRAVENOUS; SUBCUTANEOUS ONCE
Refills: 0 | Status: DISCONTINUED | OUTPATIENT
Start: 2024-10-02 | End: 2024-10-02

## 2024-10-02 RX ADMIN — Medication 20 MILLIGRAM(S): at 06:57

## 2024-10-02 RX ADMIN — PANTOPRAZOLE SODIUM 40 MILLIGRAM(S): 40 TABLET, DELAYED RELEASE ORAL at 17:03

## 2024-10-02 RX ADMIN — HYDROMORPHONE HYDROCHLORIDE 30 MILLILITER(S): 1 INJECTION, SOLUTION INTRAMUSCULAR; INTRAVENOUS; SUBCUTANEOUS at 19:22

## 2024-10-02 RX ADMIN — PREGABALIN 200 MILLIGRAM(S): 25 CAPSULE ORAL at 06:56

## 2024-10-02 RX ADMIN — OCTREOTIDE ACETATE 100 MICROGRAM(S): 50 INJECTION, SOLUTION INTRAVENOUS; SUBCUTANEOUS at 06:56

## 2024-10-02 RX ADMIN — Medication 4 MILLIGRAM(S): at 06:56

## 2024-10-02 RX ADMIN — DIAZEPAM 2 MILLIGRAM(S): 10 TABLET ORAL at 11:17

## 2024-10-02 RX ADMIN — MEMANTINE 10 MILLIGRAM(S): 10 TABLET ORAL at 17:03

## 2024-10-02 RX ADMIN — CHLORHEXIDINE GLUCONATE ORAL RINSE 1 APPLICATION(S): 1.2 SOLUTION DENTAL at 11:17

## 2024-10-02 RX ADMIN — METHADONE HYDROCHLORIDE 10 MILLIGRAM(S): 10 TABLET ORAL at 16:02

## 2024-10-02 RX ADMIN — Medication 4 MILLIGRAM(S): at 09:19

## 2024-10-02 RX ADMIN — Medication 20 MILLIGRAM(S): at 11:17

## 2024-10-02 RX ADMIN — METHADONE HYDROCHLORIDE 10 MILLIGRAM(S): 10 TABLET ORAL at 06:56

## 2024-10-02 RX ADMIN — PREGABALIN 200 MILLIGRAM(S): 25 CAPSULE ORAL at 16:02

## 2024-10-02 RX ADMIN — HYDROMORPHONE HYDROCHLORIDE 0.5 MILLIGRAM(S): 1 INJECTION, SOLUTION INTRAMUSCULAR; INTRAVENOUS; SUBCUTANEOUS at 14:03

## 2024-10-02 RX ADMIN — Medication 4 MILLIGRAM(S): at 11:18

## 2024-10-02 RX ADMIN — Medication 4 MILLIGRAM(S): at 17:03

## 2024-10-02 RX ADMIN — PREGABALIN 200 MILLIGRAM(S): 25 CAPSULE ORAL at 20:54

## 2024-10-02 RX ADMIN — Medication 80 MILLIGRAM(S): at 20:54

## 2024-10-02 RX ADMIN — HYDROMORPHONE HYDROCHLORIDE 30 MILLILITER(S): 1 INJECTION, SOLUTION INTRAMUSCULAR; INTRAVENOUS; SUBCUTANEOUS at 07:23

## 2024-10-02 RX ADMIN — PANTOPRAZOLE SODIUM 40 MILLIGRAM(S): 40 TABLET, DELAYED RELEASE ORAL at 06:56

## 2024-10-02 RX ADMIN — HYDROMORPHONE HYDROCHLORIDE 0.5 MILLIGRAM(S): 1 INJECTION, SOLUTION INTRAMUSCULAR; INTRAVENOUS; SUBCUTANEOUS at 15:03

## 2024-10-02 RX ADMIN — HYDROMORPHONE HYDROCHLORIDE 30 MILLILITER(S): 1 INJECTION, SOLUTION INTRAMUSCULAR; INTRAVENOUS; SUBCUTANEOUS at 23:28

## 2024-10-02 RX ADMIN — MEMANTINE 10 MILLIGRAM(S): 10 TABLET ORAL at 06:56

## 2024-10-02 RX ADMIN — METHADONE HYDROCHLORIDE 15 MILLIGRAM(S): 10 TABLET ORAL at 20:54

## 2024-10-02 RX ADMIN — FUROSEMIDE 40 MILLIGRAM(S): 10 INJECTION INTRAVENOUS at 06:57

## 2024-10-02 RX ADMIN — OCTREOTIDE ACETATE 100 MICROGRAM(S): 50 INJECTION, SOLUTION INTRAVENOUS; SUBCUTANEOUS at 17:03

## 2024-10-02 NOTE — PROGRESS NOTE ADULT - ASSESSMENT
38y Female who is followed by neurology because of left sided weakness/numbness    Metastatic beast cancer to brain  Her symptoms are likely secondary to metastases.  No evidence of stroke on MRI.  Will discontinue c-EEG.   Avoid antiplatelt/anticoagulants as risk for hemorrhage into mets.     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   MRIs as above with suggestion of meningeal enhancement.  (Leptomeningeal disease of the proximal and likely distal cauda equina).  CSF studies requested.   Will perform LP at bedside.     Case discussed with Oncology team (Dr Rivers attending).

## 2024-10-02 NOTE — DIETITIAN INITIAL EVALUATION ADULT - PERTINENT LABORATORY DATA
10-02    142  |  103  |  14.2  ----------------------------<  137[H]  3.8   |  24.0  |  0.50    Ca    7.4[L]      02 Oct 2024 04:15    A1C with Estimated Average Glucose Result: 4.2 % (09-28-24 @ 04:38)

## 2024-10-02 NOTE — PROGRESS NOTE ADULT - SUBJECTIVE AND OBJECTIVE BOX
[facility]                                        [facility] [place]                                 Raul Florentino, & Naveed                                  [address]                                        [place], [place], [address]                                             [phone]        CC: Brain metastases     HPI:   The patient is a 38y Female who presented with left sided numbness and weakness that started yesterday.  Her father noticed that she was having intermittent facial weakness.  She has stage 4 breast cancer, mets to brain and bone.  She has multiple brain mets, is s/p both whole brain XRT and focussed gamma knife surgery. She has been off chemo due to anemia.  Neurology is asked to evaluate. (AR).     Interim history:  Remains on 4 [place].    ROS:   Denies headache or dizziness.  Denies chest pain.  Denies shortness of breath.    MEDICATIONS  (STANDING):  chlorhexidine 2% Cloths 1 Application(s) Topical daily  dexAMETHasone     Tablet 4 milliGRAM(s) Oral every 6 hours  FLUoxetine 80 milliGRAM(s) Oral at bedtime  furosemide    Tablet 40 milliGRAM(s) Oral daily  HYDROmorphone PCA (1 mG/mL) 30 milliLiter(s) PCA Continuous PCA Continuous  memantine 10 milliGRAM(s) Oral every 12 hours  methadone    Tablet 10 milliGRAM(s) Oral <User Schedule>  methadone    Tablet 15 milliGRAM(s) Oral <User Schedule>  methylphenidate 20 milliGRAM(s) Oral <User Schedule>  methylphenidate 20 milliGRAM(s) Oral <User Schedule>  octreotide  Injectable 100 MICROGram(s) SubCutaneous two times a day  pantoprazole    Tablet 40 milliGRAM(s) Oral every 12 hours  pregabalin 200 milliGRAM(s) Oral three times a day    Vital Signs Last 24 Hrs  T(C): 36.7 (02 Oct 2024 09:41), Max: 36.8 (01 Oct 2024 16:10)  T(F): 98 (02 Oct 2024 09:41), Max: 98.3 (02 Oct 2024 00:02)  HR: 68 (02 Oct 2024 09:41) (64 - 81)  BP: 123/82 (02 Oct 2024 09:41) (100/68 - 123/82)  BP(mean): 80 (02 Oct 2024 06:37) (77 - 88)  RR: 18 (02 Oct 2024 09:41) (16 - 18)  SpO2: 97% (02 Oct 2024 09:41) (96% - 98%)    Parameters below as of 02 Oct 2024 09:41  Patient On (Oxygen Delivery Method): room air    Detailed Neurologic Exam:    Mental status: The patient is awake and alert. There is no aphasia. There is no dysarthria.     Cranial nerves: Pupils equal and react symmetrically to light. There is no visual field deficit to threat. Extraocular motion is full with no nystagmus. Facial sensation is intact. Facial musculature is asymmetric, very slight left facial weakness. Hearing poor. Palate elevates symmetrically. Tongue is midline.    Motor: There is normal bulk and tone.  There is no tremor.  Strength grossly 5/5 bilaterally.    Sensation: Grossly intact to light touch and pin. Left dory sensory loss has improved    Reflexes: 2+ throughout and plantar responses are flexor.    Cerebellar: No dysmetria on finger nose testing.      Labs:     10-02    142  |  103  |  14.2  ----------------------------<  137[H]  3.8   |  24.0  |  0.50    Ca    7.4[L]      02 Oct 2024 04:15                              10.4   4.85  )-----------( 151      ( 02 Oct 2024 04:15 )             34.0       Rad:   MRI cervical/thoracic/lumbar images reviewed.   1. CERVICAL SPINE:   Diffuse osseous metastases. No interval epidural disease.   No leptomeningeal disease.    2. THORACIC SPINE:   Diffuse osseous metastases. Intermediate grade epidural disease at T3, mild at T6 and T9.   No leptomeningeal disease.    3. LUMBAR SPINE:   Diffuse osseous metastases. No significant epidural disease.   1. CERVICAL SPINE:   Diffuse osseous metastases. No interval epidural   disease. No leptomeningeal disease.    2. THORACIC SPINE:   Diffuse osseous metastases. Intermediate grade   epidural disease at T3, mild at T6 and T9. No leptomeningeal disease.    3. LUMBAR SPINE:   Diffuse osseous metastases. No significant epidural disease.   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    4. Enhancing brain lesions, see previous MR brain. Abnormalities in the   left chest, see previous CT. Left L5 unilateral spondylolysis.   Postsurgical change at the L5-S1 level. Myositis patternin the lower   lumbar and upper sacral paraspinal musculature, nonspecific        4. Enhancing brain lesions, see previous MR brain. Abnormalities in the   left chest, see previous CT. Left L5 unilateral spondylolysis.   Postsurgical change at the L5-S1 level. Myositis patternin the lower   lumbar and upper sacral paraspinal musculature, nonspecific

## 2024-10-02 NOTE — ED ADULT NURSE NOTE - NURSING NEURO ORIENTATION
Plz call    Diabetes not controlled    Increasing jardiance to 25 mg daily and insulin to 20 units - needs labs in 1-2 months - CMP and A1C ordered    oriented to person, place and time

## 2024-10-02 NOTE — PROGRESS NOTE ADULT - ASSESSMENT
37 y/o female with h/o metastatic breast cancer , mets to brain presents to [facility] ER for new onset facial  numbness and droop admitted for stroke work up. MRI brain shows new hemorrhagic mets. Pain management consulted for pain management.      dPCA 0/0.3/8/6    cont home methadone 10/10//15    s/p LP today  will order a one time dilaudid ivp for post LP pain    when due for discharge:  DC PCA  - Recommend starting dilaudid PO 4mg/8mg [id number] PRN mod/severe pain

## 2024-10-02 NOTE — PROGRESS NOTE ADULT - ASSESSMENT
39 y/o female with h/o metastatic breast cancer , mets to brain came to the er for lt. facial  numbness and droop  admitted for stroke work up. MRI brain with new hemorrhagic mets.     #Lt facial numbness  #MRI brain with new hemorrhagic Metastasis, h/o met breast cancer  #Stroke ruled out  CT scan studies for stroke no acute findings.   follow mri w/contrast> as above  neuro checks.   follow lipid panel and statin plan per findings, ct studies no acute findings for stroke.   recent echo from july 2024, EF 55-60 %  Holding Asa and lovenox  neuro consulted  Heme/onc consulted, ezekiel recs    #Hx of Metastatic Breast cancer  -pt follows with Dr Chapa at [facility]  -Hemeonc consult placed   -[place] on board-MR spine with mets  -Pain mx consulted. On PCA pump  -now s/p LP to r/o leptomeningeal disease, f/u cytology  -Rad onc on board, may consider intrathecal methotrexate if LP positive for leptomeningeal disease     #Hypokalemia: Now resolved   -Monitor BMP    #Anxiety  #Insomnia  -continue pt's prozac  -Valium at bedtime     #Normocytic Anemia likely AOCD  -Hg at 10.4, anemia in the setting of metastatic breast cancer  -s/p 2u prbc  -serial CBC    Dispo: pending LP results, rad onc and onc result  39 y/o female with h/o metastatic breast cancer , mets to brain came to the er for lt. facial  numbness and droop  admitted for stroke work up. MRI brain with new hemorrhagic mets.     #Hx of Metastatic Breast cancer  -pt follows with Dr Chapa at [facility]  -Hemeonc consult placed   -[place] on board-MR spine with mets disease   -Pain mx consulted. On PCA pump  -now s/p LP to r/o leptomeningeal disease, f/u cytology  -Rad onc on board, may consider intrathecal methotrexate if LP positive for leptomeningeal disease     #Lt facial numbness  #MRI brain with new hemorrhagic Metastasis, h/o met breast cancer  #Stroke ruled out  -CT scan studies for stroke no acute findings.   -follow mri w/contrast> as above  -neuro checks.   -follow lipid panel and statin plan per findings, ct studies no acute findings for stroke.   -recent echo from july 2024, EF 55-60 %  -Holding Asa and lovenox  -neuro consulted  -Heme/onc consulted, ezekiel recs    #Hypokalemia: Now resolved   -Monitor BMP    #Anxiety  #Insomnia  -continue pt's prozac  -Valium at bedtime     #Normocytic Anemia likely AOCD  -Hg at 10.4, anemia in the setting of metastatic breast cancer  -s/p 2u prbc  -serial CBC    Dispo: pending LP results, rad onc and onc result

## 2024-10-02 NOTE — PROGRESS NOTE ADULT - SUBJECTIVE AND OBJECTIVE BOX
Interval Hx:  Patient seen during rounds  Patient reports pain to be controlled on current medications  Patient denies sedation with medications     Analgesic Dosing for past 24 hours reviewed as below:    diazepam    Tablet   2 milliGRAM(s) Oral (10-02-24 @ 11:17)   2 milliGRAM(s) Oral (10-01-24 @ 23:55)    FLUoxetine   80 milliGRAM(s) Oral (10-01-24 @ 20:53)    memantine   10 milliGRAM(s) Oral (10-02-24 @ 06:56)   10 milliGRAM(s) Oral (10-01-24 @ 17:16)    methadone    Tablet   15 milliGRAM(s) Oral (10-01-24 @ 20:52)    methadone    Tablet   10 milliGRAM(s) Oral (10-02-24 @ 06:56)   10 milliGRAM(s) Oral (10-01-24 @ 14:04)    methylphenidate   20 milliGRAM(s) Oral (10-02-24 @ 11:17)    methylphenidate   20 milliGRAM(s) Oral (10-02-24 @ 06:57)    ondansetron Injectable   4 milliGRAM(s) IV Push (10-02-24 @ 09:19)    pregabalin   200 milliGRAM(s) Oral (10-02-24 @ 06:56)   200 milliGRAM(s) Oral (10-01-24 @ 20:53)   200 milliGRAM(s) Oral (10-01-24 @ 14:04)          T(C): 36.7 (10-02-24 @ 09:41), Max: 36.8 (10-01-24 @ 16:10)  HR: 68 (10-02-24 @ 09:41) (64 - 81)  BP: 123/82 (10-02-24 @ 09:41) (100/68 - 123/82)  RR: 18 (10-02-24 @ 09:41) (16 - 18)  SpO2: 97% (10-02-24 @ 09:41) (96% - 98%)      10-01-24 @ 07:01  -  10-02-24 @ 07:00  --------------------------------------------------------  IN: 240 mL / OUT: 0 mL / NET: 240 mL        albuterol    90 MICROgram(s) HFA Inhaler 2 Puff(s) Inhalation every 6 hours PRN  chlorhexidine 2% Cloths 1 Application(s) Topical daily  dexAMETHasone     Tablet 4 milliGRAM(s) Oral every 6 hours  diazepam    Tablet 2 milliGRAM(s) Oral at bedtime PRN  FLUoxetine 80 milliGRAM(s) Oral at bedtime  furosemide    Tablet 40 milliGRAM(s) Oral daily  HYDROmorphone  Injectable 2 milliGRAM(s) IV Push every 3 hours PRN  HYDROmorphone  Injectable 1 milliGRAM(s) IV Push every 4 hours PRN  HYDROmorphone PCA (1 mG/mL) 30 milliLiter(s) PCA Continuous PCA Continuous  memantine 10 milliGRAM(s) Oral every 12 hours  methadone    Tablet 15 milliGRAM(s) Oral <User Schedule>  methadone    Tablet 10 milliGRAM(s) Oral <User Schedule>  methylphenidate 20 milliGRAM(s) Oral <User Schedule>  methylphenidate 20 milliGRAM(s) Oral <User Schedule>  naloxone Injectable 0.1 milliGRAM(s) IV Push every 3 minutes PRN  octreotide  Injectable 100 MICROGram(s) SubCutaneous two times a day  ondansetron Injectable 4 milliGRAM(s) IV Push every 6 hours PRN  pantoprazole    Tablet 40 milliGRAM(s) Oral every 12 hours  polyethylene glycol 3350 17 Gram(s) Oral daily PRN  pregabalin 200 milliGRAM(s) Oral three times a day  simethicone 80 milliGRAM(s) Chew four times a day PRN                          10.4   4.85  )-----------( 151      ( 02 Oct 2024 04:15 )             34.0     10-02    142  |  103  |  14.2  ----------------------------<  137[H]  3.8   |  24.0  |  0.50    Ca    7.4[L]      02 Oct 2024 04:15        Urinalysis Basic - ( 02 Oct 2024 04:15 )    Color: x / Appearance: x / SG: x / pH: x  Gluc: 137 mg/dL / Ketone: x  / Bili: x / Urobili: x   Blood: x / Protein: x / Nitrite: x   Leuk Esterase: x / RBC: x / WBC x   Sq Epi: x / Non Sq Epi: x / Bacteria: x        Pain Service   [phone]

## 2024-10-02 NOTE — PROGRESS NOTE ADULT - ASSESSMENT
37yo F who follows DR Chapa at [facility] for a history of metastatic breast cancer, ER/UT neg, Her-2 pos, originally diagnosed 10/21, treated with Taxane/Herceptin, Enhertu, now plan for Kadcyla, on hold because of anemia.  Has known bone, liver, brain mets; has had radiation  Admitted with headache, left fail numbness, mild LLE weakness.  Had MRI with contrast that showed extensive disease, with left parasagittal, fronto parietal, bilateral hemispheric lesions, bilateral leptomeningeal enhancement.  Currently c/o significant headache, despite Decadron and Dilaudid and Methadone.      BREAST CANCER  - follows Dr Chapa at [facility]  - Widely met Breast cancer to liver, bone, brain  -  Admitted with left sided facial numbness  - MRI of brain with contrast shows sign disease-concern for lepto will need to compare with outpt studies  - Per Neurio radiologist CT changed to MRI spine cervical thoracic and lumbar to R/O Lepto  - Discussed with outpt RT; will follow up outpt- pt declines RT at present  - Neurology following No evidence of stroke on MRI. Will discontinue c-EEG.   - Neurosurg consult requested for possible LP; candidate for IT MTX if se has leptomeningeal disease  - Neurosurgery following- -Ok from a NSG standpoint to undergo LP for intrathecal methotrexate  - pain management - on PCA  - OK to use Port   - Recommend Zofran for nausea PRN    awaiting MRI     Will follow 39yo F who follows DR Chapa at [facility] for a history of metastatic breast cancer, ER/CA neg, Her-2 pos, originally diagnosed 10/21, treated with Taxane/Herceptin, Enhertu, now plan for Kadcyla, on hold because of anemia.  Has known bone, liver, brain mets; has had radiation  Admitted with headache, left fail numbness, mild LLE weakness.  Had MRI with contrast that showed extensive disease, with left parasagittal, fronto parietal, bilateral hemispheric lesions, bilateral leptomeningeal enhancement.  Currently c/o significant headache, despite Decadron and Dilaudid and Methadone.      BREAST CANCER  - follows Dr Chapa at [facility]  - Widely met Breast cancer to liver, bone, brain  -  Admitted with left sided facial numbness  - MRI of brain with contrast shows sign disease-concern for lepto will need to compare with outpt studies  - Per Neuro radiologist CT changed to MRI spine cervical thoracic and lumbar to R/O Lepto  - Discussed with outpt RT; will follow up outpt- pt declines RT at present  - Neurology following No evidence of stroke on MRI. discontinued c-EEG.   - Neurosurgery following- -Ok from a NSG standpoint to undergo LP for intrathecal methotrexate  -MRI spine shows    CERVICAL SPINE:   Diffuse osseous metastases. No interval epidural disease. No leptomeningeal disease.  THORACIC SPINE:   Diffuse osseous metastases. Intermediate grade epidural disease at T3, mild at T6 and T9. No leptomeningeal disease.  LUMBAR SPINE:   Diffuse osseous metastases. No significant epidural disease. 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

## 2024-10-02 NOTE — DIETITIAN INITIAL EVALUATION ADULT - ORAL INTAKE PTA/DIET HISTORY
Pt with some difficulty communicating hx. Pt known to this service and followed on previous admissions. Pt's weight was 135lb on most recent admission in Jan 2024. Pt reporting current weight approx 140lbs. Pt endorses poor appetite and PO intake for a little while, unable to provide clear hx. Adm weight per documentation- 156lbs noted.  Pt known to this service and followed on previous admissions. Pt's weight was 135lb on most recent admission in Jan 2024. Pt reporting current weight approx 140lbs. Pt endorses poor appetite and PO intake for a little while, unable to provide clear hx. Adm weight per documentation- 156lbs noted.

## 2024-10-02 NOTE — DIETITIAN INITIAL EVALUATION ADULT - NS FNS DIET ORDER
Diet, Regular:   Supplement Feeding Modality:  Oral  Ensure Plus High Protein Cans or Servings Per Day:  1       Frequency:  Two Times a day (10-02-24 @ 16:21)

## 2024-10-02 NOTE — PROGRESS NOTE ADULT - SUBJECTIVE AND OBJECTIVE BOX
Sapna Andres M.D.    Patient is a 38y old  Female who presents with a chief complaint of Left facial numbness (02 Oct 2024 07:57)      SUBJECTIVE / OVERNIGHT EVENTS: no event overnight.     Patient denies chest pain, SOB, abd pain, N/V, fever, chills, dysuria or any other complaints. All remainder ROS negative.     MEDICATIONS  (STANDING):  chlorhexidine 2% Cloths 1 Application(s) Topical daily  dexAMETHasone     Tablet 4 milliGRAM(s) Oral every 6 hours  FLUoxetine 80 milliGRAM(s) Oral at bedtime  furosemide    Tablet 40 milliGRAM(s) Oral daily  HYDROmorphone PCA (1 mG/mL) 30 milliLiter(s) PCA Continuous PCA Continuous  memantine 10 milliGRAM(s) Oral every 12 hours  methadone    Tablet 15 milliGRAM(s) Oral <User Schedule>  methadone    Tablet 10 milliGRAM(s) Oral <User Schedule>  methylphenidate 20 milliGRAM(s) Oral <User Schedule>  methylphenidate 20 milliGRAM(s) Oral <User Schedule>  octreotide  Injectable 100 MICROGram(s) SubCutaneous two times a day  pantoprazole    Tablet 40 milliGRAM(s) Oral every 12 hours  pregabalin 200 milliGRAM(s) Oral three times a day    MEDICATIONS  (PRN):  albuterol    90 MICROgram(s) HFA Inhaler 2 Puff(s) Inhalation every 6 hours PRN Shortness of Breath and/or Wheezing  diazepam    Tablet 2 milliGRAM(s) Oral at bedtime PRN insomnia  HYDROmorphone  Injectable 2 milliGRAM(s) IV Push every 3 hours PRN Severe Pain (7 - 10)  HYDROmorphone  Injectable 1 milliGRAM(s) IV Push every 4 hours PRN Breakthrough Pain  naloxone Injectable 0.1 milliGRAM(s) IV Push every 3 minutes PRN For ANY of the following changes in patient status:  A. RR LESS THAN 10 breaths per minute, B. Oxygen saturation LESS THAN 90%, C. Sedation score of 6  ondansetron Injectable 4 milliGRAM(s) IV Push every 6 hours PRN Nausea  polyethylene glycol 3350 17 Gram(s) Oral daily PRN Constipation  simethicone 80 milliGRAM(s) Chew four times a day PRN Gas      I&O's Summary    01 Oct 2024 07:01  -  02 Oct 2024 07:00  --------------------------------------------------------  IN: 240 mL / OUT: 0 mL / NET: 240 mL        PHYSICAL EXAM:  Vital Signs Last 24 Hrs  T(C): 36.7 (02 Oct 2024 09:41), Max: 36.8 (01 Oct 2024 16:10)  T(F): 98 (02 Oct 2024 09:41), Max: 98.3 (02 Oct 2024 00:02)  HR: 68 (02 Oct 2024 09:41) (64 - 81)  BP: 123/82 (02 Oct 2024 09:41) (100/68 - 123/82)  BP(mean): 80 (02 Oct 2024 06:37) (77 - 88)  RR: 18 (02 Oct 2024 09:41) (16 - 18)  SpO2: 97% (02 Oct 2024 09:41) (96% - 98%)    Parameters below as of 02 Oct 2024 09:41  Patient On (Oxygen Delivery Method): room air      CONSTITUTIONAL: NAD, well-groomed  RESPIRATORY: Normal respiratory effort; lungs are clear to auscultation bilaterally  CARDIOVASCULAR: Regular rate and rhythm, no LE edema  ABDOMEN: Nontender to palpation, normoactive bowel sounds  PSYCH: A+O x3; affect appropriate  NEUROLOGY: CN 2-12 are intact and symmetric; no gross sensory deficits;     LABS:                        10.4   4.85  )-----------( 151      ( 02 Oct 2024 04:15 )             34.0     10-02    142  |  103  |  14.2  ----------------------------<  137[H]  3.8   |  24.0  |  0.50    Ca    7.4[L]      02 Oct 2024 04:15            Urinalysis Basic - ( 02 Oct 2024 04:15 )    Color: x / Appearance: x / SG: x / pH: x  Gluc: 137 mg/dL / Ketone: x  / Bili: x / Urobili: x   Blood: x / Protein: x / Nitrite: x   Leuk Esterase: x / RBC: x / WBC x   Sq Epi: x / Non Sq Epi: x / Bacteria: x        CAPILLARY BLOOD GLUCOSE          RADIOLOGY & ADDITIONAL TESTS:  Results Reviewed:   Imaging Personally Reviewed:  Electrocardiogram Personally Reviewed:

## 2024-10-02 NOTE — PROGRESS NOTE ADULT - SUBJECTIVE AND OBJECTIVE BOX
CC: Patient being seen for rehabilitation follow up.  Patient walking back from bathroom.  Assisted again with positioning IV pole and wires/plugging in.  Noted more of a lean sitting and unsteadiness of her gait.   Reports she a rough day yesterday and hopes that she can rest today.     FUNCTIONAL PROGRESS  10/2 PMR  Ambulation/Transfers - MOD I, Slowed, Needed cues    9/30 SLP  · Diagnosis	Mild dysarthria  · Patient/Family Goals Statement	"my speech is slurred & sometimes I do have trouble saying what I want to"  · Criteria for Skilled Therapeutic Interventions Met	skilled criteria for intervention met  · Therapy Frequency	as schedule permits  · Comments	Speech tx services RX following discharge: case management aware    Behavioral Exam:   · Behavioral Observations	alert  · Orientation	intact    Auditory Comprehension:   · Answers Yes/No Questions	within functional limits  · Able to Follow Commands	within functional limits  · Discourse	intact  · Body Part ID	intact  · Open Ended Questions	intact    Verbal Expression:   · Confrontational Naming	intact  · Responsive Naming	intact  · Conversational Speech	Fluent, appropriate syntax & semantics, some hesitations/pauses noted in running speech however functional t/o assessment    Cognitive Linguistic Status Examination:   · Diffuse Language Characteristics	no    Motor Speech:   · Articulation	imprecise  · Prosody	impaired  · Comments	Mildly reduced speech intelligibility in conversation/running speech with varied articulatory imprecision & prosody (pt with good awareness). Pt educated re: tasks to practice        VITALS  T(C): 36.7 (10-02-24 @ 06:37), Max: 36.8 (10-01-24 @ 16:10)  HR: 74 (10-02-24 @ 06:37) (64 - 90)  BP: 111/64 (10-02-24 @ 06:37) (100/68 - 116/74)  RR: 16 (10-02-24 @ 06:37) (16 - 18)  SpO2: 98% (10-02-24 @ 06:37) (96% - 98%)  Wt(kg): --    MEDICATIONS   albuterol    90 MICROgram(s) HFA Inhaler 2 Puff(s) every 6 hours PRN  chlorhexidine 2% Cloths 1 Application(s) daily  dexAMETHasone     Tablet 4 milliGRAM(s) every 6 hours  diazepam    Tablet 2 milliGRAM(s) at bedtime PRN  FLUoxetine 80 milliGRAM(s) at bedtime  furosemide    Tablet 40 milliGRAM(s) daily  HYDROmorphone  Injectable 2 milliGRAM(s) every 3 hours PRN  HYDROmorphone  Injectable 1 milliGRAM(s) every 4 hours PRN  HYDROmorphone PCA (1 mG/mL) 30 milliLiter(s) PCA Continuous  memantine 10 milliGRAM(s) every 12 hours  methadone    Tablet 10 milliGRAM(s) <User Schedule>  methadone    Tablet 15 milliGRAM(s) <User Schedule>  methylphenidate 20 milliGRAM(s) <User Schedule>  methylphenidate 20 milliGRAM(s) <User Schedule>  naloxone Injectable 0.1 milliGRAM(s) every 3 minutes PRN  octreotide  Injectable 100 MICROGram(s) two times a day  ondansetron Injectable 4 milliGRAM(s) every 6 hours PRN  pantoprazole    Tablet 40 milliGRAM(s) every 12 hours  polyethylene glycol 3350 17 Gram(s) daily PRN  pregabalin 200 milliGRAM(s) three times a day  simethicone 80 milliGRAM(s) four times a day PRN      RECENT LABS/IMAGING  - Reviewed Today                        10.4   4.85  )-----------( 151      ( 02 Oct 2024 04:15 )             34.0     10-02    142  |  103  |  14.2  ----------------------------<  137[H]  3.8   |  24.0  |  0.50    Ca    7.4[L]      02 Oct 2024 04:15        Urinalysis Basic - ( 02 Oct 2024 04:15 )    Color: x / Appearance: x / SG: x / pH: x  Gluc: 137 mg/dL / Ketone: x  / Bili: x / Urobili: x   Blood: x / Protein: x / Nitrite: x   Leuk Esterase: x / RBC: x / WBC x   Sq Epi: x / Non Sq Epi: x / Bacteria: x              MRI HEAD 9/28 - Left parasagittal frontoparietal and bilateral cerebellar hemisphere metastatic parenchymal lesions as detailed in the body the report. Additional abnormal leptomeningeal enhancement in the right and possibly left cerebellar hemispheres.    EEG 9/29 - Normal EEG study in the awake / drowsy / asleep states. There were no epileptiform abnormalities recorded.        ----------------------------------------------------------------------------------------  PHYSICAL EXAM  Constitutional - NAD, Comfortable  Neurologic Exam -                    Cognitive - AAO to self, place, date, year, situation     Communication - Mild dysarthria       Cranial Nerves - Left lip droop     FUNCTIONAL MOTOR EXAM - No apparent focal deficits, 4/5 due to fatigue     Sensory - Intact to LT   Psychiatric - Fatigued, hopeful   ----------------------------------------------------------------------------------------  ASSESSMENT/PLAN  38yFemale with functional deficits related to 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  Right cerebellar edema, Bilateral cerebellar parenchymal lesions/leptomeningeal enhancement - Decadron, Pending LP for intrathecal methotrexate   New hemorrhagic brain metastasis, cerebral edema - dexamethasone  Executive Dysfunction - Continue Ritalin, Continue Namenda  Adjustment Mood D/O - Prozac, Recommend Valium and Xanax PRN  Chronic Cancer Pain/Headaches - Continue Dilaudid IV/PCA, Continue Methadone, Lyrica,   DVT PPX - SCDs  Rehab/Impaired mobility and function - Patient continues to require hospitalization for the above diagnoses and ongoing active management of comorbid complications that are substantially posing a threat to bodily function, functional ability and quality of life.     Patient remains functionally at a modified independent level for DC HOME with HOME CARE. However, patient pending ongoing treatment/workup that may affect her functional status. There is a bit of concern with her current status getting worse.     Will continue to follow. Rehab recommendations are dependent on how functional progress changes as well as how patient continues to participate and tolerate therapeutic interventions, WHICH MAY CHANGE UPON FOLLOW UP EXAMINATIONS. Recommend ongoing mobilization by staff to maintain cardiopulmonary function and prevention of secondary complications related to debility/immobility.

## 2024-10-02 NOTE — DIETITIAN INITIAL EVALUATION ADULT - PROBLEM SELECTOR PLAN 1
will admit for stroke work up.  CT scan studies for stroke no acute findings.   follow mri  neuro checks.   follow lipid panel and statin plan per findings, ct studies no acute findings for stroke.   recent echo from july 2024, EF 55-60 %  lovenox for dvt prophylaxis.  neuro consult called by ER.

## 2024-10-02 NOTE — DIETITIAN INITIAL EVALUATION ADULT - OTHER INFO
PROVIDER:[TOKEN:[[id number]],FOLLOWUP:[1 month]] 39 y/o female with h/o metastatic breast cancer , mets to brain came to the er for lt. facial  numbness and droop  admitted for stroke work up. MRI brain with new hemorrhagic mets.     #Hx of Metastatic Breast cancer

## 2024-10-02 NOTE — PROGRESS NOTE ADULT - SUBJECTIVE AND OBJECTIVE BOX
38y Female seen on consultation for the evaluation and management of metastatic breast cancer, ER/IN neg, Her-2 pos, originally diagnosed 10/21, treated with Taxane/Herceptin, Enhertu, now receiving treatment with Kadcyla, on hold because of anemia.  Has known bone, liver, brain mets; has had radiation  Admitted with headache, left fail numbness, mild LLE weakness.  Had HRI with contrast that showed extensive disease, with left parasagittal, fronto parietal, bilateral hemispheric lesions, bilateral leptomeningeal enhancement.  Currently c/o significant headache, despite Decadron and Dilaudid and Methadone.  Denies c/o SOB, chest pain.  Has nausea.  Denies numbness tingling dysuria or hematuria    Reports anemia; has been requiring PRBC tx; Hb now 10.1 post transfusion  Reports headaches are better; awaiting pain management; no new neurologic deficits    PAST MEDICAL & SURGICAL HISTORY:  H/O compression fracture of spine  Anxiety  Metastatic breast cancer  H/O pleural effusion  Pericardial effusion  S/P tonsillectomy  H/O chest tube placement 12/23/21  S/P pericardiocentesis 12/28/21    Allergies  pertuzumab (Other (Severe))  Perjeta (Other (Severe))    MEDICATIONS  (STANDING):  chlorhexidine 2% Cloths 1 Application(s) Topical daily  dexAMETHasone     Tablet 4 milliGRAM(s) Oral every 6 hours  FLUoxetine 80 milliGRAM(s) Oral at bedtime  furosemide    Tablet 40 milliGRAM(s) Oral daily  memantine 10 milliGRAM(s) Oral every 12 hours  methadone    Tablet 10 milliGRAM(s) Oral <User Schedule>  methadone    Tablet 15 milliGRAM(s) Oral <User Schedule>  methylphenidate 20 milliGRAM(s) Oral two times a day  octreotide  Injectable 100 MICROGram(s) SubCutaneous two times a day  pantoprazole    Tablet 40 milliGRAM(s) Oral every 12 hours  pregabalin 200 milliGRAM(s) Oral three times a day    MEDICATIONS  (PRN):  albuterol    90 MICROgram(s) HFA Inhaler 2 Puff(s) Inhalation every 6 hours PRN Shortness of Breath and/or Wheezing  diazepam    Tablet 2 milliGRAM(s) Oral at bedtime PRN insomnia  HYDROmorphone  Injectable 2 milliGRAM(s) IV Push every 3 hours PRN Severe Pain (7 - 10)  HYDROmorphone  Injectable 1 milliGRAM(s) IV Push every 4 hours PRN Breakthrough Pain  polyethylene glycol 3350 17 Gram(s) Oral daily PRN Constipation  simethicone 80 milliGRAM(s) Chew four times a day PRN Gas    Vital Signs Last 24 Hrs  T(C): 36.7 (02 Oct 2024 06:37), Max: 36.8 (01 Oct 2024 16:10)  T(F): 98 (02 Oct 2024 06:37), Max: 98.3 (02 Oct 2024 00:02)  HR: 74 (02 Oct 2024 06:37) (64 - 90)  BP: 111/64 (02 Oct 2024 06:37) (100/68 - 116/74)  BP(mean): 80 (02 Oct 2024 06:37) (77 - 88)  RR: 16 (02 Oct 2024 06:37) (16 - 18)  SpO2: 98% (02 Oct 2024 06:37) (96% - 98%)    Parameters below as of 02 Oct 2024 06:37  Patient On (Oxygen Delivery Method): room air      PHYSICAL EXAM:  Constitutional:  awake, alert   Eyes: anicteric  Neck:Supple  Respiratory:Clear  Cardiovascular:RRR normal S1S2  Gastrointestinal: soft  Extremities:DENNEY  Neurological:Awake, alert, spont speech ; mild left sided facial assymetry        CBC                        10.4   4.85  )-----------( 151      ( 02 Oct 2024 04:15 )             34.0                             9.7    7.20  )-----------( 164      ( 01 Oct 2024 05:24 )             32.1                           10.1   4.52  )-----------( 165      ( 30 Sep 2024 04:10 )             33.0                       9.6    2.86  )-----------( 130      ( 29 Sep 2024 06:25 )             31.5     CHEM    10-02    142  |  103  |  14.2  ----------------------------<  137[H]  3.8   |  24.0  |  0.50    Ca    7.4[L]      02 Oct 2024 04:15      10-01    140  |  103  |  15.8  ----------------------------<  154[H]  3.9   |  26.0  |  0.44[L]    Ca    7.7[L]      01 Oct 2024 05:24  Mg     2.0     09-30 09-29    141  |  104  |  14.8  ----------------------------<  119[H]  4.0   |  28.0  |  0.61    Ca    8.3[L]      29 Sep 2024 06:25  Mg     2.0     09-29    TPro  5.2[L]  /  Alb  3.2[L]  /  TBili  0.8  /  DBili  x   /  AST  39[H]  /  ALT  19  /  AlkPhos  238[H]  09-28    PT/INR - ( 27 Sep 2024 20:55 )   PT: 13.8 sec;   INR: 1.19 ratio         PTT - ( 27 Sep 2024 20:55 )  PTT:39.7 sec  Urinalysis Basic - ( 29 Sep 2024 06:25 )    Color: x / Appearance: x / SG: x / pH: x  Gluc: 119 mg/dL / Ketone: x  / Bili: x / Urobili: x   Blood: x / Protein: x / Nitrite: x   Leuk Esterase: x / RBC: x / WBC x   Sq Epi: x / Non Sq Epi: x / Bacteria: x        RADIOLOGY & ADDITIONAL STUDIES:

## 2024-10-03 LAB
NON-GYNECOLOGICAL CYTOLOGY STUDY: SIGNIFICANT CHANGE UP
TM INTERPRETATION: SIGNIFICANT CHANGE UP

## 2024-10-03 PROCEDURE — 99233 SBSQ HOSP IP/OBS HIGH 50: CPT | Mod: GC

## 2024-10-03 PROCEDURE — 99232 SBSQ HOSP IP/OBS MODERATE 35: CPT

## 2024-10-03 PROCEDURE — 88108 CYTOPATH CONCENTRATE TECH: CPT | Mod: 26

## 2024-10-03 RX ORDER — HYDROMORPHONE HYDROCHLORIDE 1 MG/ML
4 INJECTION, SOLUTION INTRAMUSCULAR; INTRAVENOUS; SUBCUTANEOUS EVERY 4 HOURS
Refills: 0 | Status: DISCONTINUED | OUTPATIENT
Start: 2024-10-03 | End: 2024-10-04

## 2024-10-03 RX ORDER — HYDROMORPHONE HYDROCHLORIDE 1 MG/ML
8 INJECTION, SOLUTION INTRAMUSCULAR; INTRAVENOUS; SUBCUTANEOUS EVERY 4 HOURS
Refills: 0 | Status: DISCONTINUED | OUTPATIENT
Start: 2024-10-03 | End: 2024-10-04

## 2024-10-03 RX ORDER — FUROSEMIDE 10 MG/ML
20 INJECTION INTRAVENOUS DAILY
Refills: 0 | Status: DISCONTINUED | OUTPATIENT
Start: 2024-10-04 | End: 2024-10-04

## 2024-10-03 RX ORDER — ALPRAZOLAM 0.5 MG/1
1 TABLET ORAL AT BEDTIME
Refills: 0 | Status: DISCONTINUED | OUTPATIENT
Start: 2024-10-03 | End: 2024-10-04

## 2024-10-03 RX ADMIN — Medication 4 MILLIGRAM(S): at 18:17

## 2024-10-03 RX ADMIN — CHLORHEXIDINE GLUCONATE ORAL RINSE 1 APPLICATION(S): 1.2 SOLUTION DENTAL at 12:02

## 2024-10-03 RX ADMIN — PANTOPRAZOLE SODIUM 40 MILLIGRAM(S): 40 TABLET, DELAYED RELEASE ORAL at 18:16

## 2024-10-03 RX ADMIN — MEMANTINE 10 MILLIGRAM(S): 10 TABLET ORAL at 06:01

## 2024-10-03 RX ADMIN — Medication 4 MILLIGRAM(S): at 00:06

## 2024-10-03 RX ADMIN — PANTOPRAZOLE SODIUM 40 MILLIGRAM(S): 40 TABLET, DELAYED RELEASE ORAL at 06:01

## 2024-10-03 RX ADMIN — Medication 4 MILLIGRAM(S): at 12:00

## 2024-10-03 RX ADMIN — Medication 20 MILLIGRAM(S): at 06:01

## 2024-10-03 RX ADMIN — ALPRAZOLAM 1 MILLIGRAM(S): 0.5 TABLET ORAL at 15:52

## 2024-10-03 RX ADMIN — OCTREOTIDE ACETATE 100 MICROGRAM(S): 50 INJECTION, SOLUTION INTRAVENOUS; SUBCUTANEOUS at 18:16

## 2024-10-03 RX ADMIN — METHADONE HYDROCHLORIDE 10 MILLIGRAM(S): 10 TABLET ORAL at 06:00

## 2024-10-03 RX ADMIN — MEMANTINE 10 MILLIGRAM(S): 10 TABLET ORAL at 18:16

## 2024-10-03 RX ADMIN — OCTREOTIDE ACETATE 100 MICROGRAM(S): 50 INJECTION, SOLUTION INTRAVENOUS; SUBCUTANEOUS at 06:00

## 2024-10-03 RX ADMIN — HYDROMORPHONE HYDROCHLORIDE 30 MILLILITER(S): 1 INJECTION, SOLUTION INTRAMUSCULAR; INTRAVENOUS; SUBCUTANEOUS at 07:08

## 2024-10-03 RX ADMIN — Medication 20 MILLIGRAM(S): at 12:00

## 2024-10-03 RX ADMIN — PREGABALIN 200 MILLIGRAM(S): 25 CAPSULE ORAL at 22:02

## 2024-10-03 RX ADMIN — METHADONE HYDROCHLORIDE 15 MILLIGRAM(S): 10 TABLET ORAL at 22:02

## 2024-10-03 RX ADMIN — HYDROMORPHONE HYDROCHLORIDE 4 MILLIGRAM(S): 1 INJECTION, SOLUTION INTRAMUSCULAR; INTRAVENOUS; SUBCUTANEOUS at 18:54

## 2024-10-03 RX ADMIN — HYDROMORPHONE HYDROCHLORIDE 4 MILLIGRAM(S): 1 INJECTION, SOLUTION INTRAMUSCULAR; INTRAVENOUS; SUBCUTANEOUS at 18:24

## 2024-10-03 RX ADMIN — Medication 4 MILLIGRAM(S): at 06:01

## 2024-10-03 RX ADMIN — FUROSEMIDE 40 MILLIGRAM(S): 10 INJECTION INTRAVENOUS at 06:01

## 2024-10-03 RX ADMIN — PREGABALIN 200 MILLIGRAM(S): 25 CAPSULE ORAL at 06:00

## 2024-10-03 RX ADMIN — Medication 80 MILLIGRAM(S): at 22:02

## 2024-10-03 NOTE — PROGRESS NOTE ADULT - SUBJECTIVE AND OBJECTIVE BOX
Interval Hx:  Patient seen during rounds  Patient reports pain to be controlled on current medications  Patient denies sedation with medications         Analgesic Dosing for past 24 hours reviewed as below:    diazepam    Tablet   2 milliGRAM(s) Oral (10-02-24 @ 11:17)    FLUoxetine   80 milliGRAM(s) Oral (10-02-24 @ 20:54)    HYDROmorphone  Injectable   0.5 milliGRAM(s) IV Push (10-02-24 @ 14:03)    memantine   10 milliGRAM(s) Oral (10-03-24 @ 06:01)   10 milliGRAM(s) Oral (10-02-24 @ 17:03)    methadone    Tablet   10 milliGRAM(s) Oral (10-03-24 @ 06:00)   10 milliGRAM(s) Oral (10-02-24 @ 16:02)    methadone    Tablet   15 milliGRAM(s) Oral (10-02-24 @ 20:54)    methylphenidate   20 milliGRAM(s) Oral (10-03-24 @ 06:01)    methylphenidate   20 milliGRAM(s) Oral (10-02-24 @ 11:17)    ondansetron Injectable   4 milliGRAM(s) IV Push (10-02-24 @ 09:19)    pregabalin   200 milliGRAM(s) Oral (10-03-24 @ 06:00)   200 milliGRAM(s) Oral (10-02-24 @ 20:54)   200 milliGRAM(s) Oral (10-02-24 @ 16:02)          T(C): 36.4 (10-03-24 @ 05:21), Max: 36.8 (10-02-24 @ 16:27)  HR: 55 (10-03-24 @ 05:21) (55 - 85)  BP: 102/69 (10-03-24 @ 05:21) (102/69 - 123/82)  RR: 18 (10-03-24 @ 05:21) (18 - 18)  SpO2: 96% (10-03-24 @ 05:21) (96% - 100%)      10-02-24 @ 07:01  -  10-03-24 @ 07:00  --------------------------------------------------------  IN: 840 mL / OUT: 400 mL / NET: 440 mL        albuterol    90 MICROgram(s) HFA Inhaler 2 Puff(s) Inhalation every 6 hours PRN  chlorhexidine 2% Cloths 1 Application(s) Topical daily  dexAMETHasone     Tablet 4 milliGRAM(s) Oral every 6 hours  diazepam    Tablet 2 milliGRAM(s) Oral at bedtime PRN  FLUoxetine 80 milliGRAM(s) Oral at bedtime  furosemide    Tablet 40 milliGRAM(s) Oral daily  HYDROmorphone  Injectable 2 milliGRAM(s) IV Push every 3 hours PRN  HYDROmorphone  Injectable 1 milliGRAM(s) IV Push every 4 hours PRN  HYDROmorphone PCA (1 mG/mL) 30 milliLiter(s) PCA Continuous PCA Continuous  memantine 10 milliGRAM(s) Oral every 12 hours  methadone    Tablet 10 milliGRAM(s) Oral <User Schedule>  methadone    Tablet 15 milliGRAM(s) Oral <User Schedule>  methylphenidate 20 milliGRAM(s) Oral <User Schedule>  methylphenidate 20 milliGRAM(s) Oral <User Schedule>  naloxone Injectable 0.1 milliGRAM(s) IV Push every 3 minutes PRN  octreotide  Injectable 100 MICROGram(s) SubCutaneous two times a day  ondansetron Injectable 4 milliGRAM(s) IV Push every 6 hours PRN  pantoprazole    Tablet 40 milliGRAM(s) Oral every 12 hours  polyethylene glycol 3350 17 Gram(s) Oral daily PRN  pregabalin 200 milliGRAM(s) Oral three times a day  simethicone 80 milliGRAM(s) Chew four times a day PRN                          10.4   4.85  )-----------( 151      ( 02 Oct 2024 04:15 )             34.0     10-02    142  |  103  |  14.2  ----------------------------<  137[H]  3.8   |  24.0  |  0.50    Ca    7.4[L]      02 Oct 2024 04:15        Urinalysis Basic - ( 02 Oct 2024 04:15 )    Color: x / Appearance: x / SG: x / pH: x  Gluc: 137 mg/dL / Ketone: x  / Bili: x / Urobili: x   Blood: x / Protein: x / Nitrite: x   Leuk Esterase: x / RBC: x / WBC x   Sq Epi: x / Non Sq Epi: x / Bacteria: x        Pain Service   [phone]

## 2024-10-03 NOTE — PROGRESS NOTE ADULT - ASSESSMENT
38y Female who is followed by neurology because of left sided weakness/numbness    Metastatic beast cancer to brain  Her symptoms are likely secondary to metastases.  No evidence of stroke on MRI.  Will discontinue c-EEG.   Avoid antiplatelt/anticoagulants as risk for hemorrhage into mets.     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   MRIs as above with suggestion of meningeal enhancement.  CSF cytology pending.

## 2024-10-03 NOTE — PROGRESS NOTE ADULT - SUBJECTIVE AND OBJECTIVE BOX
Sapna Andres M.D.    Patient is a 38y old  Female who presents with a chief complaint of Left facial numbness (03 Oct 2024 09:13)      SUBJECTIVE / OVERNIGHT EVENTS: no event overnight.     Patient denies chest pain, SOB, abd pain, N/V, fever, chills, dysuria or any other complaints. All remainder ROS negative.     MEDICATIONS  (STANDING):  chlorhexidine 2% Cloths 1 Application(s) Topical daily  dexAMETHasone     Tablet 4 milliGRAM(s) Oral every 6 hours  FLUoxetine 80 milliGRAM(s) Oral at bedtime  furosemide    Tablet 20 milliGRAM(s) Oral daily  HYDROmorphone PCA (1 mG/mL) 30 milliLiter(s) PCA Continuous PCA Continuous  memantine 10 milliGRAM(s) Oral every 12 hours  methadone    Tablet 10 milliGRAM(s) Oral <User Schedule>  methadone    Tablet 15 milliGRAM(s) Oral <User Schedule>  methylphenidate 20 milliGRAM(s) Oral <User Schedule>  methylphenidate 20 milliGRAM(s) Oral <User Schedule>  octreotide  Injectable 100 MICROGram(s) SubCutaneous two times a day  pantoprazole    Tablet 40 milliGRAM(s) Oral every 12 hours  pregabalin 200 milliGRAM(s) Oral three times a day    MEDICATIONS  (PRN):  albuterol    90 MICROgram(s) HFA Inhaler 2 Puff(s) Inhalation every 6 hours PRN Shortness of Breath and/or Wheezing  diazepam    Tablet 2 milliGRAM(s) Oral at bedtime PRN insomnia  HYDROmorphone  Injectable 2 milliGRAM(s) IV Push every 3 hours PRN Severe Pain (7 - 10)  HYDROmorphone  Injectable 1 milliGRAM(s) IV Push every 4 hours PRN Breakthrough Pain  naloxone Injectable 0.1 milliGRAM(s) IV Push every 3 minutes PRN For ANY of the following changes in patient status:  A. RR LESS THAN 10 breaths per minute, B. Oxygen saturation LESS THAN 90%, C. Sedation score of 6  ondansetron Injectable 4 milliGRAM(s) IV Push every 6 hours PRN Nausea  polyethylene glycol 3350 17 Gram(s) Oral daily PRN Constipation  simethicone 80 milliGRAM(s) Chew four times a day PRN Gas      I&O's Summary    02 Oct 2024 07:01  -  03 Oct 2024 07:00  --------------------------------------------------------  IN: 840 mL / OUT: 400 mL / NET: 440 mL        PHYSICAL EXAM:  Vital Signs Last 24 Hrs  T(C): 36.8 (03 Oct 2024 08:43), Max: 36.8 (02 Oct 2024 16:27)  T(F): 98.3 (03 Oct 2024 08:43), Max: 98.3 (03 Oct 2024 08:43)  HR: 95 (03 Oct 2024 08:43) (55 - 95)  BP: 126/86 (03 Oct 2024 08:43) (102/69 - 126/86)  BP(mean): 85 (03 Oct 2024 00:00) (81 - 88)  RR: 18 (03 Oct 2024 08:43) (18 - 18)  SpO2: 97% (03 Oct 2024 08:43) (96% - 100%)    Parameters below as of 03 Oct 2024 08:43  Patient On (Oxygen Delivery Method): room air    CONSTITUTIONAL: NAD, well-groomed  RESPIRATORY: Normal respiratory effort; lungs are clear to auscultation bilaterally  CARDIOVASCULAR: Regular rate and rhythm, no LE edema  ABDOMEN: Nontender to palpation, normoactive bowel sounds  PSYCH: A+O x3; affect appropriate  NEUROLOGY: CN 2-12 are intact and symmetric; no gross sensory deficits;     LABS:                        10.4   4.85  )-----------( 151      ( 02 Oct 2024 04:15 )             34.0     10-02    142  |  103  |  14.2  ----------------------------<  137[H]  3.8   |  24.0  |  0.50    Ca    7.4[L]      02 Oct 2024 04:15            Urinalysis Basic - ( 02 Oct 2024 04:15 )    Color: x / Appearance: x / SG: x / pH: x  Gluc: 137 mg/dL / Ketone: x  / Bili: x / Urobili: x   Blood: x / Protein: x / Nitrite: x   Leuk Esterase: x / RBC: x / WBC x   Sq Epi: x / Non Sq Epi: x / Bacteria: x        Culture - CSF with Gram Stain (collected 02 Oct 2024 12:11)  Source: .CSF CSF  Gram Stain (02 Oct 2024 16:57):    No polymorphonuclear cells seen    No organisms seen    by cytocentrifuge      CAPILLARY BLOOD GLUCOSE          RADIOLOGY & ADDITIONAL TESTS:  Results Reviewed:   Imaging Personally Reviewed:  Electrocardiogram Personally Reviewed:

## 2024-10-03 NOTE — PROGRESS NOTE ADULT - SUBJECTIVE AND OBJECTIVE BOX
FUNCTIONAL PROGRESS  10/2 PMR  Ambulation/Transfers - MOD I, Slowed, Needed cues    9/30 SLP  · Diagnosis	Mild dysarthria  · Patient/Family Goals Statement	"my speech is slurred & sometimes I do have trouble saying what I want to"  · Criteria for Skilled Therapeutic Interventions Met	skilled criteria for intervention met  · Therapy Frequency	as schedule permits  · Comments	Speech tx services RX following discharge: case management aware    Behavioral Exam:   · Behavioral Observations	alert  · Orientation	intact    Auditory Comprehension:   · Answers Yes/No Questions	within functional limits  · Able to Follow Commands	within functional limits  · Discourse	intact  · Body Part ID	intact  · Open Ended Questions	intact    Verbal Expression:   · Confrontational Naming	intact  · Responsive Naming	intact  · Conversational Speech	Fluent, appropriate syntax & semantics, some hesitations/pauses noted in running speech however functional t/o assessment    Cognitive Linguistic Status Examination:   · Diffuse Language Characteristics	no    Motor Speech:   · Articulation	imprecise  · Prosody	impaired  · Comments	Mildly reduced speech intelligibility in conversation/running speech with varied articulatory imprecision & prosody (pt with good awareness). Pt educated re: tasks to practice              VITALS  T(C): 36.8 (10-03-24 @ 08:43), Max: 36.8 (10-02-24 @ 16:27)  HR: 95 (10-03-24 @ 08:43) (55 - 95)  BP: 126/86 (10-03-24 @ 08:43) (102/69 - 126/86)  RR: 18 (10-03-24 @ 08:43) (18 - 18)  SpO2: 97% (10-03-24 @ 08:43) (96% - 100%)  Wt(kg): --    MEDICATIONS   albuterol    90 MICROgram(s) HFA Inhaler 2 Puff(s) every 6 hours PRN  chlorhexidine 2% Cloths 1 Application(s) daily  dexAMETHasone     Tablet 4 milliGRAM(s) every 6 hours  diazepam    Tablet 2 milliGRAM(s) at bedtime PRN  FLUoxetine 80 milliGRAM(s) at bedtime  furosemide    Tablet 40 milliGRAM(s) daily  HYDROmorphone  Injectable 2 milliGRAM(s) every 3 hours PRN  HYDROmorphone  Injectable 1 milliGRAM(s) every 4 hours PRN  HYDROmorphone PCA (1 mG/mL) 30 milliLiter(s) PCA Continuous  memantine 10 milliGRAM(s) every 12 hours  methadone    Tablet 10 milliGRAM(s) <User Schedule>  methadone    Tablet 15 milliGRAM(s) <User Schedule>  methylphenidate 20 milliGRAM(s) <User Schedule>  methylphenidate 20 milliGRAM(s) <User Schedule>  naloxone Injectable 0.1 milliGRAM(s) every 3 minutes PRN  octreotide  Injectable 100 MICROGram(s) two times a day  ondansetron Injectable 4 milliGRAM(s) every 6 hours PRN  pantoprazole    Tablet 40 milliGRAM(s) every 12 hours  polyethylene glycol 3350 17 Gram(s) daily PRN  pregabalin 200 milliGRAM(s) three times a day  simethicone 80 milliGRAM(s) four times a day PRN      RECENT LABS/IMAGING  - Reviewed Today                        10.4   4.85  )-----------( 151      ( 02 Oct 2024 04:15 )             34.0     10-02    142  |  103  |  14.2  ----------------------------<  137[H]  3.8   |  24.0  |  0.50    Ca    7.4[L]      02 Oct 2024 04:15        Urinalysis Basic - ( 02 Oct 2024 04:15 )    Color: x / Appearance: x / SG: x / pH: x  Gluc: 137 mg/dL / Ketone: x  / Bili: x / Urobili: x   Blood: x / Protein: x / Nitrite: x   Leuk Esterase: x / RBC: x / WBC x   Sq Epi: x / Non Sq Epi: x / Bacteria: x                MRI HEAD 9/28 - Left parasagittal frontoparietal and bilateral cerebellar hemisphere metastatic parenchymal lesions as detailed in the body the report. Additional abnormal leptomeningeal enhancement in the right and possibly left cerebellar hemispheres.    EEG 9/29 - Normal EEG study in the awake / drowsy / asleep states. There were no epileptiform abnormalities recorded.        ----------------------------------------------------------------------------------------  PHYSICAL EXAM  Constitutional - NAD, Comfortable  Neurologic Exam -                    Cognitive - AAO to self, place, date, year, situation     Communication - Mild dysarthria       Cranial Nerves - Left lip droop     FUNCTIONAL MOTOR EXAM - No apparent focal deficits, 4/5 due to fatigue     Sensory - Intact to LT   Psychiatric - Fatigued, hopeful   ----------------------------------------------------------------------------------------  ASSESSMENT/PLAN  38yFemale with functional deficits related to 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  Right cerebellar edema, Bilateral cerebellar parenchymal lesions/leptomeningeal enhancement - Decadron, LP results pending for possible initiation of intrathecal methotrexate   New hemorrhagic brain metastasis, cerebral edema - dexamethasone  Executive Dysfunction - Continue Ritalin, Continue Namenda  Adjustment Mood D/O - Prozac, Recommend Valium and Xanax PRN  Chronic Cancer Pain/Headaches - Continue Dilaudid IV/PCA, Continue Methadone, Lyrica,   DVT PPX - SCDs  Rehab/Impaired mobility and function -   Functional mobility limitations - initiate PT /OT and continue to work with Ms Dave to tolerate her pain symptoms using compensatory strategies.  Encourage OOB as able to, and bed and chair level exercises.    -Continue to treat pain    Disposition - likely appropriate to discharge to home.  Continue with outpatient SLP, PT, OT as needed.         FUNCTIONAL PROGRESS  10/2 PMR  Ambulation/Transfers - MOD I, Slowed, Needed cues    9/30 SLP  · Diagnosis	Mild dysarthria  · Patient/Family Goals Statement	"my speech is slurred & sometimes I do have trouble saying what I want to"  · Criteria for Skilled Therapeutic Interventions Met	skilled criteria for intervention met  · Therapy Frequency	as schedule permits  · Comments	Speech tx services RX following discharge: case management aware    Behavioral Exam:   · Behavioral Observations	alert  · Orientation	intact    Auditory Comprehension:   · Answers Yes/No Questions	within functional limits  · Able to Follow Commands	within functional limits  · Discourse	intact  · Body Part ID	intact  · Open Ended Questions	intact    Verbal Expression:   · Confrontational Naming	intact  · Responsive Naming	intact  · Conversational Speech	Fluent, appropriate syntax & semantics, some hesitations/pauses noted in running speech however functional t/o assessment    Cognitive Linguistic Status Examination:   · Diffuse Language Characteristics	no    Motor Speech:   · Articulation	imprecise  · Prosody	impaired  · Comments	Mildly reduced speech intelligibility in conversation/running speech with varied articulatory imprecision & prosody (pt with good awareness). Pt educated re: tasks to practice              VITALS  T(C): 36.8 (10-03-24 @ 08:43), Max: 36.8 (10-02-24 @ 16:27)  HR: 95 (10-03-24 @ 08:43) (55 - 95)  BP: 126/86 (10-03-24 @ 08:43) (102/69 - 126/86)  RR: 18 (10-03-24 @ 08:43) (18 - 18)  SpO2: 97% (10-03-24 @ 08:43) (96% - 100%)  Wt(kg): --    MEDICATIONS   albuterol    90 MICROgram(s) HFA Inhaler 2 Puff(s) every 6 hours PRN  chlorhexidine 2% Cloths 1 Application(s) daily  dexAMETHasone     Tablet 4 milliGRAM(s) every 6 hours  diazepam    Tablet 2 milliGRAM(s) at bedtime PRN  FLUoxetine 80 milliGRAM(s) at bedtime  furosemide    Tablet 40 milliGRAM(s) daily  HYDROmorphone  Injectable 2 milliGRAM(s) every 3 hours PRN  HYDROmorphone  Injectable 1 milliGRAM(s) every 4 hours PRN  HYDROmorphone PCA (1 mG/mL) 30 milliLiter(s) PCA Continuous  memantine 10 milliGRAM(s) every 12 hours  methadone    Tablet 10 milliGRAM(s) <User Schedule>  methadone    Tablet 15 milliGRAM(s) <User Schedule>  methylphenidate 20 milliGRAM(s) <User Schedule>  methylphenidate 20 milliGRAM(s) <User Schedule>  naloxone Injectable 0.1 milliGRAM(s) every 3 minutes PRN  octreotide  Injectable 100 MICROGram(s) two times a day  ondansetron Injectable 4 milliGRAM(s) every 6 hours PRN  pantoprazole    Tablet 40 milliGRAM(s) every 12 hours  polyethylene glycol 3350 17 Gram(s) daily PRN  pregabalin 200 milliGRAM(s) three times a day  simethicone 80 milliGRAM(s) four times a day PRN      RECENT LABS/IMAGING  - Reviewed Today                        10.4   4.85  )-----------( 151      ( 02 Oct 2024 04:15 )             34.0     10-02    142  |  103  |  14.2  ----------------------------<  137[H]  3.8   |  24.0  |  0.50    Ca    7.4[L]      02 Oct 2024 04:15        Urinalysis Basic - ( 02 Oct 2024 04:15 )    Color: x / Appearance: x / SG: x / pH: x  Gluc: 137 mg/dL / Ketone: x  / Bili: x / Urobili: x   Blood: x / Protein: x / Nitrite: x   Leuk Esterase: x / RBC: x / WBC x   Sq Epi: x / Non Sq Epi: x / Bacteria: x                MRI HEAD 9/28 - Left parasagittal frontoparietal and bilateral cerebellar hemisphere metastatic parenchymal lesions as detailed in the body the report. Additional abnormal leptomeningeal enhancement in the right and possibly left cerebellar hemispheres.    EEG 9/29 - Normal EEG study in the awake / drowsy / asleep states. There were no epileptiform abnormalities recorded.      10/2 MRI W/Contrast Cervical, Thoracic, and Lumbar Spine  IMPRESSION:    1. CERVICAL SPINE:   Diffuse osseous metastases. No interval epidural   disease. No leptomeningeal disease.    2. THORACIC SPINE:   Diffuse osseous metastases. Intermediate grade   epidural disease at T3, mild at T6 and T9. No leptomeningeal disease.    3. LUMBAR SPINE:   Diffuse osseous metastases. No significant epidural   disease. 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    4. Enhancing brain lesions, see previous MR brain. Abnormalities in the   left chest, see previous CT. Left L5 unilateral spondylolysis.   Postsurgical change at the L5-S1 level. Myositis pattern in the lower   lumbar and upper sacral paraspinal musculature, nonspecific          ----------------------------------------------------------------------------------------  PHYSICAL EXAM  Constitutional - NAD, Comfortable  Neurologic Exam -                    Cognitive - AAO to self, place, date, year, situation     Communication - Mild dysarthria       Cranial Nerves - Left lip droop     FUNCTIONAL MOTOR EXAM - No apparent focal deficits, 4/5 due to fatigue     Sensory - Intact to LT   Psychiatric - Fatigued, hopeful   ----------------------------------------------------------------------------------------  ASSESSMENT/PLAN  38yFemale with functional deficits related to 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  Right cerebellar edema, Bilateral cerebellar parenchymal lesions/leptomeningeal enhancement - Decadron, LP results pending for possible initiation of intrathecal methotrexate   New hemorrhagic brain metastasis, cerebral edema - dexamethasone  Executive Dysfunction - Continue Ritalin, Continue Namenda  Adjustment Mood D/O - Prozac, Recommend Valium and Xanax PRN  Chronic Cancer Pain/Headaches - Continue Dilaudid IV/PCA, Continue Methadone, Lyrica,   DVT PPX - SCDs  Rehab/Impaired mobility and function -   Functional mobility limitations - initiate PT /OT and continue to work with Ms Dave to tolerate her pain symptoms using compensatory strategies.  Encourage OOB as able to, and bed and chair level exercises.    -Continue to treat pain    Disposition - likely appropriate to discharge to home.  Continue with outpatient SLP, PT, OT as needed.       Ms Dave states that she is feeling well today, though she is tired and feels she had a very big day yesterday.  She is wanting to rest today.  Ms Dave seen coming out of the restroom, and assisted back to her bed.  No acute overnight events.  She expresses interest in knowing the results of her LP from yesterday.       FUNCTIONAL PROGRESS  10/2 PMR  Ambulation/Transfers - MOD I, Slowed, Needed cues    9/30 SLP  · Diagnosis	Mild dysarthria  · Patient/Family Goals Statement	"my speech is slurred & sometimes I do have trouble saying what I want to"  · Criteria for Skilled Therapeutic Interventions Met	skilled criteria for intervention met  · Therapy Frequency	as schedule permits  · Comments	Speech tx services RX following discharge: case management aware    Behavioral Exam:   · Behavioral Observations	alert  · Orientation	intact    Auditory Comprehension:   · Answers Yes/No Questions	within functional limits  · Able to Follow Commands	within functional limits  · Discourse	intact  · Body Part ID	intact  · Open Ended Questions	intact    Verbal Expression:   · Confrontational Naming	intact  · Responsive Naming	intact  · Conversational Speech	Fluent, appropriate syntax & semantics, some hesitations/pauses noted in running speech however functional t/o assessment    Cognitive Linguistic Status Examination:   · Diffuse Language Characteristics	no    Motor Speech:   · Articulation	imprecise  · Prosody	impaired  · Comments	Mildly reduced speech intelligibility in conversation/running speech with varied articulatory imprecision & prosody (pt with good awareness). Pt educated re: tasks to practice              VITALS  T(C): 36.8 (10-03-24 @ 08:43), Max: 36.8 (10-02-24 @ 16:27)  HR: 95 (10-03-24 @ 08:43) (55 - 95)  BP: 126/86 (10-03-24 @ 08:43) (102/69 - 126/86)  RR: 18 (10-03-24 @ 08:43) (18 - 18)  SpO2: 97% (10-03-24 @ 08:43) (96% - 100%)  Wt(kg): --    MEDICATIONS   albuterol    90 MICROgram(s) HFA Inhaler 2 Puff(s) every 6 hours PRN  chlorhexidine 2% Cloths 1 Application(s) daily  dexAMETHasone     Tablet 4 milliGRAM(s) every 6 hours  diazepam    Tablet 2 milliGRAM(s) at bedtime PRN  FLUoxetine 80 milliGRAM(s) at bedtime  furosemide    Tablet 40 milliGRAM(s) daily  HYDROmorphone  Injectable 2 milliGRAM(s) every 3 hours PRN  HYDROmorphone  Injectable 1 milliGRAM(s) every 4 hours PRN  HYDROmorphone PCA (1 mG/mL) 30 milliLiter(s) PCA Continuous  memantine 10 milliGRAM(s) every 12 hours  methadone    Tablet 10 milliGRAM(s) <User Schedule>  methadone    Tablet 15 milliGRAM(s) <User Schedule>  methylphenidate 20 milliGRAM(s) <User Schedule>  methylphenidate 20 milliGRAM(s) <User Schedule>  naloxone Injectable 0.1 milliGRAM(s) every 3 minutes PRN  octreotide  Injectable 100 MICROGram(s) two times a day  ondansetron Injectable 4 milliGRAM(s) every 6 hours PRN  pantoprazole    Tablet 40 milliGRAM(s) every 12 hours  polyethylene glycol 3350 17 Gram(s) daily PRN  pregabalin 200 milliGRAM(s) three times a day  simethicone 80 milliGRAM(s) four times a day PRN      RECENT LABS/IMAGING  - Reviewed Today                        10.4   4.85  )-----------( 151      ( 02 Oct 2024 04:15 )             34.0     10-02    142  |  103  |  14.2  ----------------------------<  137[H]  3.8   |  24.0  |  0.50    Ca    7.4[L]      02 Oct 2024 04:15        Urinalysis Basic - ( 02 Oct 2024 04:15 )    Color: x / Appearance: x / SG: x / pH: x  Gluc: 137 mg/dL / Ketone: x  / Bili: x / Urobili: x   Blood: x / Protein: x / Nitrite: x   Leuk Esterase: x / RBC: x / WBC x   Sq Epi: x / Non Sq Epi: x / Bacteria: x                MRI HEAD 9/28 - Left parasagittal frontoparietal and bilateral cerebellar hemisphere metastatic parenchymal lesions as detailed in the body the report. Additional abnormal leptomeningeal enhancement in the right and possibly left cerebellar hemispheres.    EEG 9/29 - Normal EEG study in the awake / drowsy / asleep states. There were no epileptiform abnormalities recorded.      10/2 MRI W/Contrast Cervical, Thoracic, and Lumbar Spine  IMPRESSION:    1. CERVICAL SPINE:   Diffuse osseous metastases. No interval epidural   disease. No leptomeningeal disease.    2. THORACIC SPINE:   Diffuse osseous metastases. Intermediate grade   epidural disease at T3, mild at T6 and T9. No leptomeningeal disease.    3. LUMBAR SPINE:   Diffuse osseous metastases. No significant epidural   disease. 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    4. Enhancing brain lesions, see previous MR brain. Abnormalities in the   left chest, see previous CT. Left L5 unilateral spondylolysis.   Postsurgical change at the L5-S1 level. Myositis pattern in the lower   lumbar and upper sacral paraspinal musculature, nonspecific          ----------------------------------------------------------------------------------------  PHYSICAL EXAM  Constitutional - NAD, Comfortable  Neurologic Exam -                    Cognitive - AAO to self, place, date, year, situation     Communication - Mild dysarthria       Cranial Nerves - Left lip droop     FUNCTIONAL MOTOR EXAM - No apparent focal deficits, 4/5 due to fatigue,           -mild functional motor inattention to her left side          -dyscoordination noted with ambulating out of the bathroom with her IV pole.       Sensory - Intact to LT   Psychiatric - Fatigued, hopeful   ----------------------------------------------------------------------------------------  ASSESSMENT/PLAN  38yFemale with functional deficits related to 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  Right cerebellar edema, Bilateral cerebellar parenchymal lesions/leptomeningeal enhancement - Decadron, LP results pending for possible initiation of intrathecal methotrexate   New hemorrhagic brain metastasis, cerebral edema - dexamethasone  Executive Dysfunction - Continue Ritalin, Continue Namenda  Adjustment Mood D/O - Prozac, Recommend Valium and Xanax PRN  Chronic Cancer Pain/Headaches - Continue Dilaudid IV/PCA, Continue Methadone, Lyrica,   DVT PPX - SCDs  Rehab/Impaired mobility and function -   Functional mobility limitations - initiate PT /OT and continue to work with Ms Dave to tolerate her pain symptoms using compensatory strategies.  Encourage OOB as able to, and bed and chair level exercises.    -Continue to treat pain    Disposition - likely appropriate to discharge to home.  Continue with outpatient SLP, PT, OT as needed.       Ms Dave states that she is feeling well today, though she is tired and feels she had a very big day yesterday.  She is wanting to rest today.  Ms Dave seen coming out of the restroom, and assisted back to her bed.  No acute overnight events.  She expresses interest in knowing the results of her LP from yesterday.       FUNCTIONAL PROGRESS  10/2 PMR  Ambulation/Transfers - MOD I, Slowed, Needed cues    9/30 SLP  · Diagnosis	Mild dysarthria  · Patient/Family Goals Statement	"my speech is slurred & sometimes I do have trouble saying what I want to"  · Criteria for Skilled Therapeutic Interventions Met	skilled criteria for intervention met  · Therapy Frequency	as schedule permits  · Comments	Speech tx services RX following discharge: case management aware    Behavioral Exam:   · Behavioral Observations	alert  · Orientation	intact    Auditory Comprehension:   · Answers Yes/No Questions	within functional limits  · Able to Follow Commands	within functional limits  · Discourse	intact  · Body Part ID	intact  · Open Ended Questions	intact    Verbal Expression:   · Confrontational Naming	intact  · Responsive Naming	intact  · Conversational Speech	Fluent, appropriate syntax & semantics, some hesitations/pauses noted in running speech however functional t/o assessment    Cognitive Linguistic Status Examination:   · Diffuse Language Characteristics	no    Motor Speech:   · Articulation	imprecise  · Prosody	impaired  · Comments	Mildly reduced speech intelligibility in conversation/running speech with varied articulatory imprecision & prosody (pt with good awareness). Pt educated re: tasks to practice              VITALS  T(C): 36.8 (10-03-24 @ 08:43), Max: 36.8 (10-02-24 @ 16:27)  HR: 95 (10-03-24 @ 08:43) (55 - 95)  BP: 126/86 (10-03-24 @ 08:43) (102/69 - 126/86)  RR: 18 (10-03-24 @ 08:43) (18 - 18)  SpO2: 97% (10-03-24 @ 08:43) (96% - 100%)  Wt(kg): --    MEDICATIONS   albuterol    90 MICROgram(s) HFA Inhaler 2 Puff(s) every 6 hours PRN  chlorhexidine 2% Cloths 1 Application(s) daily  dexAMETHasone     Tablet 4 milliGRAM(s) every 6 hours  diazepam    Tablet 2 milliGRAM(s) at bedtime PRN  FLUoxetine 80 milliGRAM(s) at bedtime  furosemide    Tablet 40 milliGRAM(s) daily  HYDROmorphone  Injectable 2 milliGRAM(s) every 3 hours PRN  HYDROmorphone  Injectable 1 milliGRAM(s) every 4 hours PRN  HYDROmorphone PCA (1 mG/mL) 30 milliLiter(s) PCA Continuous  memantine 10 milliGRAM(s) every 12 hours  methadone    Tablet 10 milliGRAM(s) <User Schedule>  methadone    Tablet 15 milliGRAM(s) <User Schedule>  methylphenidate 20 milliGRAM(s) <User Schedule>  methylphenidate 20 milliGRAM(s) <User Schedule>  naloxone Injectable 0.1 milliGRAM(s) every 3 minutes PRN  octreotide  Injectable 100 MICROGram(s) two times a day  ondansetron Injectable 4 milliGRAM(s) every 6 hours PRN  pantoprazole    Tablet 40 milliGRAM(s) every 12 hours  polyethylene glycol 3350 17 Gram(s) daily PRN  pregabalin 200 milliGRAM(s) three times a day  simethicone 80 milliGRAM(s) four times a day PRN      RECENT LABS/IMAGING  - Reviewed Today                        10.4   4.85  )-----------( 151      ( 02 Oct 2024 04:15 )             34.0     10-02    142  |  103  |  14.2  ----------------------------<  137[H]  3.8   |  24.0  |  0.50    Ca    7.4[L]      02 Oct 2024 04:15        Urinalysis Basic - ( 02 Oct 2024 04:15 )    Color: x / Appearance: x / SG: x / pH: x  Gluc: 137 mg/dL / Ketone: x  / Bili: x / Urobili: x   Blood: x / Protein: x / Nitrite: x   Leuk Esterase: x / RBC: x / WBC x   Sq Epi: x / Non Sq Epi: x / Bacteria: x                MRI HEAD 9/28 - Left parasagittal frontoparietal and bilateral cerebellar hemisphere metastatic parenchymal lesions as detailed in the body the report. Additional abnormal leptomeningeal enhancement in the right and possibly left cerebellar hemispheres. Independently reviewed and interpreted     EEG 9/29 - Normal EEG study in the awake / drowsy / asleep states. There were no epileptiform abnormalities recorded.      10/2 MRI W/Contrast Cervical, Thoracic, and Lumbar Spine  IMPRESSION:    1. CERVICAL SPINE:   Diffuse osseous metastases. No interval epidural   disease. No leptomeningeal disease.    2. THORACIC SPINE:   Diffuse osseous metastases. Intermediate grade   epidural disease at T3, mild at T6 and T9. No leptomeningeal disease.    3. LUMBAR SPINE:   Diffuse osseous metastases. No significant epidural   disease. 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    4. Enhancing brain lesions, see previous MR brain. Abnormalities in the   left chest, see previous CT. Left L5 unilateral spondylolysis.   Postsurgical change at the L5-S1 level. Myositis pattern in the lower   lumbar and upper sacral paraspinal musculature, nonspecific          ----------------------------------------------------------------------------------------  PHYSICAL EXAM  Constitutional - NAD, Comfortable  Neurologic Exam -                    Cognitive - AAO to self, place, date, year, situation     Communication - Mild dysarthria       Cranial Nerves - Left lip droop     FUNCTIONAL MOTOR EXAM - No apparent focal deficits, 4/5 due to fatigue,           -mild functional motor inattention to her left side          -dyscoordination noted with ambulating out of the bathroom with her IV pole.       Sensory - Intact to LT   Psychiatric - Fatigued, hopeful   ----------------------------------------------------------------------------------------  ASSESSMENT/PLAN  38yFemale with functional deficits related to 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  Right cerebellar edema, Bilateral cerebellar parenchymal lesions/leptomeningeal enhancement - Decadron, LP results pending for possible initiation of intrathecal methotrexate   New hemorrhagic brain metastasis, cerebral edema - dexamethasone  Executive Dysfunction - Continue Ritalin, Continue Namenda  Adjustment Mood D/O - Prozac, Recommend Valium and Xanax PRN  Chronic Cancer Pain/Headaches - Continue Dilaudid IV/PCA, Continue Methadone, Lyrica,   DVT PPX - SCDs  Rehab/Impaired mobility and function -   Functional mobility limitations - initiate PT /OT and continue to work with Ms Dave to tolerate her pain symptoms using compensatory strategies.  Encourage OOB as able to, and bed and chair level exercises.    -Continue to treat pain    Disposition - likely appropriate to discharge to home.  Continue with outpatient SLP, PT, OT as needed.

## 2024-10-03 NOTE — PROGRESS NOTE ADULT - ASSESSMENT
39 y/o female with h/o metastatic breast cancer , mets to brain came to the er for lt. facial  numbness and droop  admitted for stroke work up. MRI brain with new hemorrhagic mets.     #Hx of Metastatic Breast cancer  -pt follows with Dr Chapa at [facility]  -Hemeonc consult placed   -[place] on board-MR spine with mets disease   -Pain mx consulted. On PCA pump  -now s/p LP to r/o leptomeningeal disease, f/u cytology  -Rad onc on board, may consider intrathecal methotrexate if LP positive for leptomeningeal disease     #Lt facial numbness  #MRI brain with new hemorrhagic Metastasis, h/o met breast cancer  #Stroke ruled out  -CT scan studies for stroke no acute findings.   -follow mri w/contrast> as above  -neuro checks.   -follow lipid panel and statin plan per findings, ct studies no acute findings for stroke.   -recent echo from july 2024, EF 55-60 %  -Holding Asa and lovenox  -neuro consulted  -Heme/onc consulted, ezekiel recs    #Hypokalemia: Now resolved   -Monitor BMP    #Anxiety  #Insomnia  -continue pt's prozac  -Valium at bedtime     #Normocytic Anemia likely AOCD  -Hg at 10.4, anemia in the setting of metastatic breast cancer  -s/p 2u prbc  -serial CBC    Dispo: pending LP results, rad onc and onc result

## 2024-10-03 NOTE — PROGRESS NOTE ADULT - ASSESSMENT
39yo F who follows DR Chapa at [facility] for a history of metastatic breast cancer, ER/MO neg, Her-2 pos, originally diagnosed 10/21, treated with Taxane/Herceptin, Enhertu, now plan for Kadcyla, on hold because of anemia.  Has known bone, liver, brain mets; has had radiation  Admitted with headache, left fail numbness, mild LLE weakness.  Had MRI with contrast that showed extensive disease, with left parasagittal, fronto parietal, bilateral hemispheric lesions, bilateral leptomeningeal enhancement.  Currently c/o significant headache, despite Decadron and Dilaudid and Methadone.      BREAST CANCER  - follows Dr Chapa at [facility]  - Widely met Breast cancer to liver, bone, brain  -  Admitted with left sided facial numbness  - MRI of brain with contrast shows sign disease-concern for lepto will need to compare with outpt studies  - Per Neuro radiologist CT changed to MRI spine cervical thoracic and lumbar to R/O Lepto  - Discussed with outpt RT; will follow up outpt- pt declines RT at present  - Neurology following No evidence of stroke on MRI. discontinued c-EEG.   - Neurosurgery following- -Ok from a NSG standpoint to undergo LP for intrathecal methotrexate  -MRI spine shows    CERVICAL SPINE:   Diffuse osseous metastases. No interval epidural disease. No leptomeningeal disease.  THORACIC SPINE:   Diffuse osseous metastases. Intermediate grade epidural disease at T3, mild at T6 and T9. No leptomeningeal disease.  LUMBAR SPINE:   Diffuse osseous metastases. No significant epidural disease. 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

## 2024-10-03 NOTE — PROGRESS NOTE ADULT - ASSESSMENT
A: 39 y/o female with h/o metastatic breast cancer , mets to brain presents to [facility] ER for new onset facial  numbness and droop admitted for stroke work up. MRI brain shows new hemorrhagic mets. Pain management consulted for pain management.    P:   A: 37 y/o female with h/o metastatic breast cancer , mets to brain presents to [facility] ER for new onset facial  numbness and droop admitted for stroke work up. MRI brain shows new hemorrhagic mets. Pain management consulted for pain management.    dPCA 0/0.3/8/6    cont home methadone 10/10//15    s/p LP yesterday  denies gamez      when due for discharge:  DC PCA  - Recommend starting dilaudid PO 4mg/8mg [id number] PRN mod/severe pain

## 2024-10-03 NOTE — PROGRESS NOTE ADULT - SUBJECTIVE AND OBJECTIVE BOX
[facility]                                        Neurology at [place]                                 Raul Florentino, & Naveed                                  [address]                                        [place], [place], [address]                                             [phone]        CC: Brain metastases     HPI:   The patient is a 38y Female who presented with left sided numbness and weakness that started yesterday.  Her father noticed that she was having intermittent facial weakness.  She has stage 4 breast cancer, mets to brain and bone.  She has multiple brain mets, is s/p both whole brain XRT and focussed gamma knife surgery. She has been off chemo due to anemia.  Neurology is asked to evaluate. (AR).     Interim history:  Remains on 4 Michelet.    ROS:   Denies headache or dizziness.  Denies chest pain.  Denies shortness of breath.    MEDICATIONS  (STANDING):  chlorhexidine 2% Cloths 1 Application(s) Topical daily  dexAMETHasone     Tablet 4 milliGRAM(s) Oral every 6 hours  FLUoxetine 80 milliGRAM(s) Oral at bedtime  furosemide    Tablet 20 milliGRAM(s) Oral daily  HYDROmorphone PCA (1 mG/mL) 30 milliLiter(s) PCA Continuous PCA Continuous  memantine 10 milliGRAM(s) Oral every 12 hours  methadone    Tablet 15 milliGRAM(s) Oral <User Schedule>  methadone    Tablet 10 milliGRAM(s) Oral <User Schedule>  methylphenidate 20 milliGRAM(s) Oral <User Schedule>  methylphenidate 20 milliGRAM(s) Oral <User Schedule>  octreotide  Injectable 100 MICROGram(s) SubCutaneous two times a day  pantoprazole    Tablet 40 milliGRAM(s) Oral every 12 hours  pregabalin 200 milliGRAM(s) Oral three times a day    Vital Signs Last 24 Hrs  T(C): 36.7 (03 Oct 2024 12:04), Max: 36.8 (02 Oct 2024 16:27)  T(F): 98.1 (03 Oct 2024 12:04), Max: 98.3 (03 Oct 2024 08:43)  HR: 90 (03 Oct 2024 12:04) (55 - 95)  BP: 118/75 (03 Oct 2024 12:04) (102/69 - 126/86)  BP(mean): 85 (03 Oct 2024 00:00) (81 - 88)  RR: 17 (03 Oct 2024 12:04) (17 - 18)  SpO2: 98% (03 Oct 2024 12:04) (96% - 100%)    Parameters below as of 03 Oct 2024 12:04  Patient On (Oxygen Delivery Method): room air    Detailed Neurologic Exam:    Mental status: The patient is awake and alert. There is no aphasia. There is no dysarthria.     Cranial nerves: Pupils equal and react symmetrically to light. There is no visual field deficit to threat. Extraocular motion is full with no nystagmus. Facial sensation is intact. Facial musculature is asymmetric, very slight left facial weakness. Hearing poor. Palate elevates symmetrically. Tongue is midline.    Motor: There is normal bulk and tone.  There is no tremor.  Strength grossly 5/5 bilaterally.    Sensation: Grossly intact to light touch and pin. Left dory sensory loss has improved    Reflexes: 2+ throughout and plantar responses are flexor.    Cerebellar: No dysmetria on finger nose testing.    Labs:     10-02    142  |  103  |  14.2  ----------------------------<  137[H]  3.8   |  24.0  |  0.50    Ca    7.4[L]      02 Oct 2024 04:15                              10.4   4.85  )-----------( 151      ( 02 Oct 2024 04:15 )             34.0     CSF  WBC: 1  RBC: 22  Protein: 37  Glucose: 87  Cryptococcal Ag: negative   Gram stain: negative   PCR panel: None detected.       Rad:   MRI cervical/thoracic/lumbar images reviewed.   1. CERVICAL SPINE:   Diffuse osseous metastases. No interval epidural disease.   No leptomeningeal disease.    2. THORACIC SPINE:   Diffuse osseous metastases. Intermediate grade epidural disease at T3, mild at T6 and T9.   No leptomeningeal disease.    3. LUMBAR SPINE:   Diffuse osseous metastases. No significant epidural disease.   1. CERVICAL SPINE:   Diffuse osseous metastases. No interval epidural   disease. No leptomeningeal disease.    2. THORACIC SPINE:   Diffuse osseous metastases. Intermediate grade   epidural disease at T3, mild at T6 and T9. No leptomeningeal disease.    3. LUMBAR SPINE:   Diffuse osseous metastases. No significant epidural disease.   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    4. Enhancing brain lesions, see previous MR brain. Abnormalities in the   left chest, see previous CT. Left L5 unilateral spondylolysis.   Postsurgical change at the L5-S1 level. Myositis patternin the lower   lumbar and upper sacral paraspinal musculature, nonspecific

## 2024-10-03 NOTE — PROGRESS NOTE ADULT - SUBJECTIVE AND OBJECTIVE BOX
38y Female seen on consultation for the evaluation and management of metastatic breast cancer, ER/MI neg, Her-2 pos, originally diagnosed 10/21, treated with Taxane/Herceptin, Enhertu, now receiving treatment with Kadcyla, on hold because of anemia.  Has known bone, liver, brain mets; has had radiation  Admitted with headache, left fail numbness, mild LLE weakness.  Had HRI with contrast that showed extensive disease, with left parasagittal, fronto parietal, bilateral hemispheric lesions, bilateral leptomeningeal enhancement.  Currently c/o significant headache, despite Decadron and Dilaudid and Methadone.  Denies c/o SOB, chest pain.  Has nausea.  Denies numbness tingling dysuria or hematuria    Reports anemia; has been requiring PRBC tx; Hb now 10.1 post transfusion  Reports headaches are better; awaiting pain management; no new neurologic deficits    PAST MEDICAL & SURGICAL HISTORY:  H/O compression fracture of spine  Anxiety  Metastatic breast cancer  H/O pleural effusion  Pericardial effusion  S/P tonsillectomy  H/O chest tube placement 12/23/21  S/P pericardiocentesis 12/28/21    Allergies  pertuzumab (Other (Severe))  Perjeta (Other (Severe))    MEDICATIONS  (STANDING):  chlorhexidine 2% Cloths 1 Application(s) Topical daily  dexAMETHasone     Tablet 4 milliGRAM(s) Oral every 6 hours  FLUoxetine 80 milliGRAM(s) Oral at bedtime  furosemide    Tablet 40 milliGRAM(s) Oral daily  memantine 10 milliGRAM(s) Oral every 12 hours  methadone    Tablet 10 milliGRAM(s) Oral <User Schedule>  methadone    Tablet 15 milliGRAM(s) Oral <User Schedule>  methylphenidate 20 milliGRAM(s) Oral two times a day  octreotide  Injectable 100 MICROGram(s) SubCutaneous two times a day  pantoprazole    Tablet 40 milliGRAM(s) Oral every 12 hours  pregabalin 200 milliGRAM(s) Oral three times a day    MEDICATIONS  (PRN):  albuterol    90 MICROgram(s) HFA Inhaler 2 Puff(s) Inhalation every 6 hours PRN Shortness of Breath and/or Wheezing  diazepam    Tablet 2 milliGRAM(s) Oral at bedtime PRN insomnia  HYDROmorphone  Injectable 2 milliGRAM(s) IV Push every 3 hours PRN Severe Pain (7 - 10)  HYDROmorphone  Injectable 1 milliGRAM(s) IV Push every 4 hours PRN Breakthrough Pain  polyethylene glycol 3350 17 Gram(s) Oral daily PRN Constipation  simethicone 80 milliGRAM(s) Chew four times a day PRN Gas    Vital Signs Last 24 Hrs  T(C): 36.8 (03 Oct 2024 08:43), Max: 36.8 (02 Oct 2024 16:27)  T(F): 98.3 (03 Oct 2024 08:43), Max: 98.3 (03 Oct 2024 08:43)  HR: 95 (03 Oct 2024 08:43) (55 - 95)  BP: 126/86 (03 Oct 2024 08:43) (102/69 - 126/86)  BP(mean): 85 (03 Oct 2024 00:00) (81 - 88)  RR: 18 (03 Oct 2024 08:43) (18 - 18)  SpO2: 97% (03 Oct 2024 08:43) (96% - 100%)    Parameters below as of 03 Oct 2024 08:43  Patient On (Oxygen Delivery Method): room air      PHYSICAL EXAM:  Constitutional:  awake, alert   Eyes: anicteric  Neck:Supple  Respiratory:Clear  Cardiovascular:RRR normal S1S2  Gastrointestinal: soft  Extremities:DENNEY  Neurological:Awake, alert, spont speech ; mild left sided facial assymetry        CBC                             10.4   4.85  )-----------( 151      ( 02 Oct 2024 04:15 )             34.0                             9.7    7.20  )-----------( 164      ( 01 Oct 2024 05:24 )             32.1                           10.1   4.52  )-----------( 165      ( 30 Sep 2024 04:10 )             33.0                       9.6    2.86  )-----------( 130      ( 29 Sep 2024 06:25 )             31.5     CHEM    10-02    142  |  103  |  14.2  ----------------------------<  137[H]  3.8   |  24.0  |  0.50    Ca    7.4[L]      02 Oct 2024 04:15      10-01    140  |  103  |  15.8  ----------------------------<  154[H]  3.9   |  26.0  |  0.44[L]    Ca    7.7[L]      01 Oct 2024 05:24  Mg     2.0     09-30 09-29    141  |  104  |  14.8  ----------------------------<  119[H]  4.0   |  28.0  |  0.61    Ca    8.3[L]      29 Sep 2024 06:25  Mg     2.0     09-29    TPro  5.2[L]  /  Alb  3.2[L]  /  TBili  0.8  /  DBili  x   /  AST  39[H]  /  ALT  19  /  AlkPhos  238[H]  09-28    PT/INR - ( 27 Sep 2024 20:55 )   PT: 13.8 sec;   INR: 1.19 ratio         PTT - ( 27 Sep 2024 20:55 )  PTT:39.7 sec  Urinalysis Basic - ( 29 Sep 2024 06:25 )    Color: x / Appearance: x / SG: x / pH: x  Gluc: 119 mg/dL / Ketone: x  / Bili: x / Urobili: x   Blood: x / Protein: x / Nitrite: x   Leuk Esterase: x / RBC: x / WBC x   Sq Epi: x / Non Sq Epi: x / Bacteria: x        RADIOLOGY & ADDITIONAL STUDIES:

## 2024-10-04 ENCOUNTER — TRANSCRIPTION ENCOUNTER (OUTPATIENT)
Age: 39
End: 2024-10-04

## 2024-10-04 VITALS
OXYGEN SATURATION: 97 % | SYSTOLIC BLOOD PRESSURE: 116 MMHG | TEMPERATURE: 98 F | RESPIRATION RATE: 18 BRPM | DIASTOLIC BLOOD PRESSURE: 71 MMHG | HEART RATE: 67 BPM

## 2024-10-04 LAB
ANION GAP SERPL CALC-SCNC: 13 MMOL/L — SIGNIFICANT CHANGE UP (ref 5–17)
BUN SERPL-MCNC: 17.6 MG/DL — SIGNIFICANT CHANGE UP (ref 8–20)
CALCIUM SERPL-MCNC: 6.9 MG/DL — LOW (ref 8.4–10.5)
CHLORIDE SERPL-SCNC: 99 MMOL/L — SIGNIFICANT CHANGE UP (ref 96–108)
CO2 SERPL-SCNC: 27 MMOL/L — SIGNIFICANT CHANGE UP (ref 22–29)
CREAT SERPL-MCNC: 0.57 MG/DL — SIGNIFICANT CHANGE UP (ref 0.5–1.3)
EGFR: 120 ML/MIN/1.73M2 — SIGNIFICANT CHANGE UP
GLUCOSE SERPL-MCNC: 167 MG/DL — HIGH (ref 70–99)
HCT VFR BLD CALC: 39.6 % — SIGNIFICANT CHANGE UP (ref 34.5–45)
HGB BLD-MCNC: 12.3 G/DL — SIGNIFICANT CHANGE UP (ref 11.5–15.5)
MCHC RBC-ENTMCNC: 28.9 PG — SIGNIFICANT CHANGE UP (ref 27–34)
MCHC RBC-ENTMCNC: 31.1 GM/DL — LOW (ref 32–36)
MCV RBC AUTO: 93 FL — SIGNIFICANT CHANGE UP (ref 80–100)
PLATELET # BLD AUTO: 151 K/UL — SIGNIFICANT CHANGE UP (ref 150–400)
POTASSIUM SERPL-MCNC: 3.5 MMOL/L — SIGNIFICANT CHANGE UP (ref 3.5–5.3)
POTASSIUM SERPL-SCNC: 3.5 MMOL/L — SIGNIFICANT CHANGE UP (ref 3.5–5.3)
RBC # BLD: 4.26 M/UL — SIGNIFICANT CHANGE UP (ref 3.8–5.2)
RBC # FLD: 16 % — HIGH (ref 10.3–14.5)
SODIUM SERPL-SCNC: 138 MMOL/L — SIGNIFICANT CHANGE UP (ref 135–145)
WBC # BLD: 2.69 K/UL — LOW (ref 3.8–10.5)
WBC # FLD AUTO: 2.69 K/UL — LOW (ref 3.8–10.5)

## 2024-10-04 PROCEDURE — 99233 SBSQ HOSP IP/OBS HIGH 50: CPT | Mod: GC

## 2024-10-04 PROCEDURE — 99239 HOSP IP/OBS DSCHRG MGMT >30: CPT

## 2024-10-04 RX ORDER — HEPARIN SOD,PORCINE/0.9 % NACL 10 UNIT/ML
500 KIT INTRAVENOUS ONCE
Refills: 0 | Status: COMPLETED | OUTPATIENT
Start: 2024-10-04 | End: 2024-10-04

## 2024-10-04 RX ORDER — HYDROCORTISONE 1 %
1 OINTMENT (GRAM) TOPICAL
Qty: 10 | Refills: 0
Start: 2024-10-04 | End: 2024-10-13

## 2024-10-04 RX ORDER — FUROSEMIDE 10 MG/ML
0.5 INJECTION INTRAVENOUS
Qty: 0 | Refills: 0 | DISCHARGE

## 2024-10-04 RX ADMIN — Medication 4 MILLIGRAM(S): at 05:44

## 2024-10-04 RX ADMIN — Medication 4 MILLIGRAM(S): at 12:49

## 2024-10-04 RX ADMIN — HYDROMORPHONE HYDROCHLORIDE 8 MILLIGRAM(S): 1 INJECTION, SOLUTION INTRAMUSCULAR; INTRAVENOUS; SUBCUTANEOUS at 10:04

## 2024-10-04 RX ADMIN — Medication 500 UNIT(S): at 12:49

## 2024-10-04 RX ADMIN — Medication 20 MILLIGRAM(S): at 12:49

## 2024-10-04 RX ADMIN — METHADONE HYDROCHLORIDE 10 MILLIGRAM(S): 10 TABLET ORAL at 12:48

## 2024-10-04 RX ADMIN — PREGABALIN 200 MILLIGRAM(S): 25 CAPSULE ORAL at 05:44

## 2024-10-04 RX ADMIN — PANTOPRAZOLE SODIUM 40 MILLIGRAM(S): 40 TABLET, DELAYED RELEASE ORAL at 05:44

## 2024-10-04 RX ADMIN — FUROSEMIDE 20 MILLIGRAM(S): 10 INJECTION INTRAVENOUS at 05:44

## 2024-10-04 RX ADMIN — Medication 20 MILLIGRAM(S): at 05:44

## 2024-10-04 RX ADMIN — HYDROMORPHONE HYDROCHLORIDE 4 MILLIGRAM(S): 1 INJECTION, SOLUTION INTRAMUSCULAR; INTRAVENOUS; SUBCUTANEOUS at 00:12

## 2024-10-04 RX ADMIN — PREGABALIN 200 MILLIGRAM(S): 25 CAPSULE ORAL at 12:49

## 2024-10-04 RX ADMIN — METHADONE HYDROCHLORIDE 10 MILLIGRAM(S): 10 TABLET ORAL at 05:44

## 2024-10-04 RX ADMIN — OCTREOTIDE ACETATE 100 MICROGRAM(S): 50 INJECTION, SOLUTION INTRAVENOUS; SUBCUTANEOUS at 05:43

## 2024-10-04 RX ADMIN — Medication 4 MILLIGRAM(S): at 00:12

## 2024-10-04 RX ADMIN — HYDROMORPHONE HYDROCHLORIDE 8 MILLIGRAM(S): 1 INJECTION, SOLUTION INTRAMUSCULAR; INTRAVENOUS; SUBCUTANEOUS at 11:02

## 2024-10-04 RX ADMIN — HYDROMORPHONE HYDROCHLORIDE 4 MILLIGRAM(S): 1 INJECTION, SOLUTION INTRAMUSCULAR; INTRAVENOUS; SUBCUTANEOUS at 01:12

## 2024-10-04 RX ADMIN — MEMANTINE 10 MILLIGRAM(S): 10 TABLET ORAL at 05:44

## 2024-10-04 NOTE — DISCHARGE NOTE PROVIDER - NSDCCPCAREPLAN_GEN_ALL_CORE_FT
PRINCIPAL DISCHARGE DIAGNOSIS  Diagnosis: Primary breast cancer with metastasis to other site  Assessment and Plan of Treatment:       SECONDARY DISCHARGE DIAGNOSES  Diagnosis: Lt facial numbness  Assessment and Plan of Treatment:

## 2024-10-04 NOTE — PROGRESS NOTE ADULT - PROVIDER SPECIALTY LIST ADULT
Brain Injury Medicine
Brain Injury Medicine
Heme/Onc
Heme/Onc
Hospitalist
Hospitalist
Physiatry
Hospitalist
Neurology
Neurosurgery
Physiatry
Heme/Onc
Hospitalist
Neurology
Pain Medicine
Pain Medicine
Heme/Onc
Heme/Onc
Hospitalist
Pain Medicine
Pain Medicine

## 2024-10-04 NOTE — PROGRESS NOTE ADULT - REASON FOR ADMISSION
Left facial numbness

## 2024-10-04 NOTE — PROGRESS NOTE ADULT - ATTENDING COMMENTS
Patient seen and examined. Case discussed with Dr. Hui and agree with recommendations outlined above. I have personally reviewed the imaging and labs listed above.     Patient remains functionally at a modified independent level for DC HOME with HOME CARE. Recommend SLP as an outpatient.    Will sign off at this time. Thank you for allowing me to be part of your patient's care. Please reconsult PMR for additional rehab recommendations or dispo needs if functional status changes. Discussed the specific management and recommendations above with rehab clinical care team/rehab liaison.      Total Time Spent on Encounter (reviewing clinical notes, labs, radiology and medications, reviewing patient history, pre-rounding, physical exam, assessment and discussing rehabilitation options - with consideration of prior level of function, expected level of recovery and return to community living, rounding with team) - 53 minutes
Patient seen and examined. Case discussed with Dr. Hui and agree with recommendations outlined above. I have personally reviewed the imaging and labs listed above.    Rehab/Impaired mobility and function - Patient continues to require hospitalization for the above diagnoses and ongoing active management of comorbid complications that are substantially posing a threat to bodily function, functional ability and quality of life.     Patient remains functionally at a modified independent level for DC HOME with HOME CARE. However, patient pending ongoing treatment/workup that may affect her functional status. There is a bit of concern with her current status getting worse with her mobility and balance/position sense and insight. 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     Will continue to follow. Rehab recommendations are dependent on how functional progress changes as well as how patient continues to participate and tolerate therapeutic interventions, WHICH MAY CHANGE UPON FOLLOW UP EXAMINATIONS. Recommend ongoing mobilization by staff to maintain cardiopulmonary function and prevention of secondary complications related to debility/immobility.

## 2024-10-04 NOTE — DISCHARGE NOTE PROVIDER - NSDCMRMEDTOKEN_GEN_ALL_CORE_FT
Addressed in a different encounter.     dexAMETHasone 4 mg oral tablet: 1 tab(s) orally every 6 hours take 4mg every 6 hours until 10/5, then decrease to 4mg twice a day afterwards. Follow up with oncology for further tapering outpatient  diazePAM 2 mg oral tablet: 1 tab(s) orally 2 times a day as needed for  muscle spasm, anxiety  Dilaudid 4 mg oral tablet: 1 tab(s) orally 3 times a day as needed for  moderate pain  Dilaudid 8 mg oral tablet: 1 tab(s) orally 4 times a day as needed for  severe pain MDD: 6  FLUoxetine 40 mg oral capsule: 2 cap(s) orally once a day (at bedtime)  Lasix 40 mg oral tablet: 0.5 tab(s) orally once a day  methadone 10 mg oral tablet: 1 tab(s) orally 2 times a day  methadone 5 mg oral tablet: 3 tab(s) orally once a day (at bedtime)  Namenda 10 mg oral tablet: 1 tab(s) orally 2 times a day  octreotide 100 mcg/mL injectable solution: 100 microgram(s) 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

## 2024-10-04 NOTE — DISCHARGE NOTE PROVIDER - HOSPITAL COURSE
39 y/o female with h/o metastatic breast cancer , mets to brain came to the er for lt. facial  numbness and droop  admitted for stroke work up. MRI brain with new hemorrhagic mets. Started on decadron which she will continue with tapering done with oncology outpatient. s/p MR spine with mets disease noted as well and s/p LP which was negative for leptomeningeal disease. Seen by heme/onc, no plan for additional inpatient treatment, cleared for discharge with outpatient followup.

## 2024-10-04 NOTE — PROGRESS NOTE ADULT - ASSESSMENT
37yo F who follows DR Chapa at [facility] for a history of metastatic breast cancer, ER/DE neg, Her-2 pos, originally diagnosed 10/21, treated with Taxane/Herceptin, Enhertu, now plan for Kadcyla, on hold because of anemia.  Has known bone, liver, brain mets; has had radiation  Admitted with headache, left fail numbness, mild LLE weakness.  Had MRI with contrast that showed extensive disease, with left parasagittal, fronto parietal, bilateral hemispheric lesions, bilateral leptomeningeal enhancement.  Currently c/o significant headache, despite Decadron and Dilaudid and Methadone.      BREAST CANCER  - follows Dr Chapa at [facility]  - Widely met Breast cancer to liver, bone, brain  -  Admitted with left sided facial numbness  - MRI of brain with contrast shows sign disease-concern for lepto will need to compare with outpt studies  - Per Neuro radiologist CT changed to MRI spine cervical thoracic and lumbar to R/O Lepto  - Discussed with outpt RT; will follow up outpt- pt declines RT at present  - Neurology following No evidence of stroke on MRI. discontinued c-EEG.   - Neurosurgery following- -Ok from a NSG standpoint to undergo LP for intrathecal methotrexate if LP positive for lepto  -MRI spine shows :  CERVICAL SPINE:   Diffuse osseous metastases. No interval epidural disease. No leptomeningeal disease.  THORACIC SPINE:   Diffuse osseous metastases. Intermediate grade epidural disease at T3, mild at T6 and T9. No leptomeningeal disease.  LUMBAR SPINE:   Diffuse osseous metastases. No significant epidural disease. Leptomeningeal disease of the proximal and likely distal cauda   -S/P Lumbar puncture with Dr Rocha Cytology negative for malignant cells  - Speech is back to baseline off PCA    Follow up with Dr Chapa in the office on Monday   Continue steroids Decadron 4mg Q 6 H will taper as an oupt

## 2024-10-04 NOTE — PROGRESS NOTE ADULT - SUBJECTIVE AND OBJECTIVE BOX
FUNCTIONAL PROGRESS      VITALS  T(C): 36.4 (10-04-24 @ 07:45), Max: 36.9 (10-04-24 @ 00:00)  HR: 67 (10-04-24 @ 07:45) (57 - 95)  BP: 116/71 (10-04-24 @ 07:45) (101/64 - 126/86)  RR: 18 (10-04-24 @ 07:45) (17 - 18)  SpO2: 97% (10-04-24 @ 07:45) (95% - 99%)  Wt(kg): --    MEDICATIONS   albuterol    90 MICROgram(s) HFA Inhaler 2 Puff(s) every 6 hours PRN  ALPRAZolam 1 milliGRAM(s) at bedtime PRN  chlorhexidine 2% Cloths 1 Application(s) daily  dexAMETHasone     Tablet 4 milliGRAM(s) every 6 hours  diazepam    Tablet 2 milliGRAM(s) at bedtime PRN  FLUoxetine 80 milliGRAM(s) at bedtime  furosemide    Tablet 20 milliGRAM(s) daily  HYDROmorphone   Tablet 4 milliGRAM(s) every 4 hours PRN  HYDROmorphone   Tablet 8 milliGRAM(s) every 4 hours PRN  HYDROmorphone  Injectable 1 milliGRAM(s) every 4 hours PRN  memantine 10 milliGRAM(s) every 12 hours  methadone    Tablet 10 milliGRAM(s) <User Schedule>  methadone    Tablet 15 milliGRAM(s) <User Schedule>  methylphenidate 20 milliGRAM(s) <User Schedule>  methylphenidate 20 milliGRAM(s) <User Schedule>  naloxone Injectable 0.1 milliGRAM(s) every 3 minutes PRN  octreotide  Injectable 100 MICROGram(s) two times a day  ondansetron Injectable 4 milliGRAM(s) every 6 hours PRN  pantoprazole    Tablet 40 milliGRAM(s) every 12 hours  polyethylene glycol 3350 17 Gram(s) daily PRN  pregabalin 200 milliGRAM(s) three times a day  simethicone 80 milliGRAM(s) four times a day PRN      RECENT LABS/IMAGING  - Reviewed Today                   Ms Dave states that she is tired and is looking forward to getting some rest.  She is hoping to discharge to home today.  No major complaints at this time.  She does state that her walking is a little better, though her speech is slightly worse, and she also notes that she coughs when she drinks liquids, though not "terribly", but it is more than she would cough while drinking prior to the events leading up to this hospitalization.      FUNCTIONAL PROGRESS    FUNCTIONAL PROGRESS  10/2 PMR  Ambulation/Transfers - MOD I, Slowed, Needed cues    9/30 SLP  · Diagnosis	Mild dysarthria  · Patient/Family Goals Statement	"my speech is slurred & sometimes I do have trouble saying what I want to"  · Criteria for Skilled Therapeutic Interventions Met	skilled criteria for intervention met  · Therapy Frequency	as schedule permits  · Comments	Speech tx services RX following discharge: case management aware    Behavioral Exam:   · Behavioral Observations	alert  · Orientation	intact    Auditory Comprehension:   · Answers Yes/No Questions	within functional limits  · Able to Follow Commands	within functional limits  · Discourse	intact  · Body Part ID	intact  · Open Ended Questions	intact    Verbal Expression:   · Confrontational Naming	intact  · Responsive Naming	intact  · Conversational Speech	Fluent, appropriate syntax & semantics, some hesitations/pauses noted in running speech however functional t/o assessment    Cognitive Linguistic Status Examination:   · Diffuse Language Characteristics	no    Motor Speech:   · Articulation	imprecise  · Prosody	impaired  · Comments	Mildly reduced speech intelligibility in conversation/running speech with varied articulatory imprecision & prosody (pt with good awareness). Pt educated re: tasks to practice              VITALS  T(C): 36.4 (10-04-24 @ 07:45), Max: 36.9 (10-04-24 @ 00:00)  HR: 67 (10-04-24 @ 07:45) (57 - 95)  BP: 116/71 (10-04-24 @ 07:45) (101/64 - 126/86)  RR: 18 (10-04-24 @ 07:45) (17 - 18)  SpO2: 97% (10-04-24 @ 07:45) (95% - 99%)  Wt(kg): --    MEDICATIONS   albuterol    90 MICROgram(s) HFA Inhaler 2 Puff(s) every 6 hours PRN  ALPRAZolam 1 milliGRAM(s) at bedtime PRN  chlorhexidine 2% Cloths 1 Application(s) daily  dexAMETHasone     Tablet 4 milliGRAM(s) every 6 hours  diazepam    Tablet 2 milliGRAM(s) at bedtime PRN  FLUoxetine 80 milliGRAM(s) at bedtime  furosemide    Tablet 20 milliGRAM(s) daily  HYDROmorphone   Tablet 4 milliGRAM(s) every 4 hours PRN  HYDROmorphone   Tablet 8 milliGRAM(s) every 4 hours PRN  HYDROmorphone  Injectable 1 milliGRAM(s) every 4 hours PRN  memantine 10 milliGRAM(s) every 12 hours  methadone    Tablet 10 milliGRAM(s) <User Schedule>  methadone    Tablet 15 milliGRAM(s) <User Schedule>  methylphenidate 20 milliGRAM(s) <User Schedule>  methylphenidate 20 milliGRAM(s) <User Schedule>  naloxone Injectable 0.1 milliGRAM(s) every 3 minutes PRN  octreotide  Injectable 100 MICROGram(s) two times a day  ondansetron Injectable 4 milliGRAM(s) every 6 hours PRN  pantoprazole    Tablet 40 milliGRAM(s) every 12 hours  polyethylene glycol 3350 17 Gram(s) daily PRN  pregabalin 200 milliGRAM(s) three times a day  simethicone 80 milliGRAM(s) four times a day PRN      RECENT LABS/IMAGING  - Reviewed Today                MRI HEAD 9/28 - Left parasagittal frontoparietal and bilateral cerebellar hemisphere metastatic parenchymal lesions as detailed in the body the report. Additional abnormal leptomeningeal enhancement in the right and possibly left cerebellar hemispheres. Independently reviewed and interpreted     EEG 9/29 - Normal EEG study in the awake / drowsy / asleep states. There were no epileptiform abnormalities recorded.      10/2 MRI W/Contrast Cervical, Thoracic, and Lumbar Spine  IMPRESSION:    1. CERVICAL SPINE:   Diffuse osseous metastases. No interval epidural   disease. No leptomeningeal disease.    2. THORACIC SPINE:   Diffuse osseous metastases. Intermediate grade   epidural disease at T3, mild at T6 and T9. No leptomeningeal disease.    3. LUMBAR SPINE:   Diffuse osseous metastases. No significant epidural   disease. 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    4. Enhancing brain lesions, see previous MR brain. Abnormalities in the   left chest, see previous CT. Left L5 unilateral spondylolysis.   Postsurgical change at the L5-S1 level. Myositis pattern in the lower   lumbar and upper sacral paraspinal musculature, nonspecific          ----------------------------------------------------------------------------------------  PHYSICAL EXAM  Constitutional - NAD, Comfortable  Neurologic Exam -                    Cognitive - AAO to self, place, date, year, situation     Communication - Mild dysarthria       Cranial Nerves - Left lip droop     FUNCTIONAL MOTOR EXAM - No apparent focal deficits, 4/5 due to fatigue,           -mild functional motor inattention to her left side          -dyscoordination noted with ambulating out of the bathroom with her IV pole.       Sensory - Intact to LT   Psychiatric - Fatigued, hopeful   ----------------------------------------------------------------------------------------  ASSESSMENT/PLAN  38yFemale with functional deficits related to 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  Right cerebellar edema, Bilateral cerebellar parenchymal lesions/leptomeningeal enhancement - Decadron, LP results pending for possible initiation of intrathecal methotrexate   New hemorrhagic brain metastasis, cerebral edema - dexamethasone  Executive Dysfunction - Continue Ritalin, Continue Namenda  Adjustment Mood D/O - Prozac, Recommend Valium and Xanax PRN  Chronic Cancer Pain/Headaches - Continue Dilaudid IV/PCA, Continue Methadone, Lyrica,   DVT PPX - SCDs  Rehab/Impaired mobility and function -   Functional mobility limitations - initiate PT /OT and continue to work with Ms Dave to tolerate her pain symptoms using compensatory strategies.  Encourage OOB as able to, and bed and chair level exercises.    -Continue to treat pain    Outpatient follow up - recommend follow up with outpatient cancer rehabilitation medicine physican Dr Brantley if additional support is needed.  Outpatient SLP.   Patient preference to just continue with outpatient SLP.  SLP should follow for dysarthria and for dysphagia    Disposition - discharge home

## 2024-10-04 NOTE — DISCHARGE NOTE PROVIDER - ATTENDING DISCHARGE PHYSICAL EXAMINATION:
VITALS:   T(C): 36.4 (10-04-24 @ 07:45), Max: 36.9 (10-04-24 @ 00:00)  HR: 67 (10-04-24 @ 07:45) (57 - 90)  BP: 116/71 (10-04-24 @ 07:45) (101/64 - 122/68)  RR: 18 (10-04-24 @ 07:45) (17 - 18)  SpO2: 97% (10-04-24 @ 07:45) (95% - 99%)    CONSTITUTIONAL: NAD, well-groomed  RESPIRATORY: Normal respiratory effort; lungs are clear to auscultation bilaterally  CARDIOVASCULAR: Regular rate and rhythm, no LE edema  ABDOMEN: Nontender to palpation, normoactive bowel sounds  PSYCH: A+O x3; affect appropriate  NEUROLOGY: CN 2-12 are intact and symmetric; no gross sensory deficits;

## 2024-10-04 NOTE — CHART NOTE - NSCHARTNOTEFT_GEN_A_CORE
Food As Health Program Note:    Initial Screening    Date of Initial Screening: 10/4/24    Patient qualifies for Food As Health Program  [ x ] Patient qualifies for Food As Health Program w/ health condition  [  ] Patient does not qualify for Food As Health Program w/ no health conditions    Diagnosis that qualifies patient for program  [  ] Hypertension  [  ] Diabetes  [  ] Unintended weight loss  [  ] Obesity  [  ] CHF  [ x ] Other    Additional Comments: cancer    Current Patient Diet: Diet, Regular:   Supplement Feeding Modality:  Oral  Ensure Plus High Protein Cans or Servings Per Day:  1       Frequency:  Two Times a day (10-02-24 @ 16:21)    Actions Taken:  [ x ] Spoke with patient  [ x ] [employer] survey completed  Additional Comments: Provided with community resources and list of local food pantries. Pt would like to arrange grocery pickup with RD after D/C. RD contact information provided. RD to remain available.       Non-qualification for Food As Health Program  [   ] D/C to [facility]  [   ] Referred to Social Work  [  ] Declined Food As Health Program  [   ] D/C Before Seen By RD  Participant Phone Number:  DIANELYS Comments:      Discharge Visit  [ x ] Initial Screening already completed    Date of D/C:  [ x ]  Patient will be provided with healthy groceries  [  ] Follow Up appointment made  [ x ] Other community outreach given  [  ] Help with SNAP application at F/U appointment  [  ] Patient D/C while RD was unavailable. Will call to schedule pick-up

## 2024-10-04 NOTE — PROGRESS NOTE ADULT - SUBJECTIVE AND OBJECTIVE BOX
38y Female seen on consultation for the evaluation and management of metastatic breast cancer, ER/RI neg, Her-2 pos, originally diagnosed 10/21, treated with Taxane/Herceptin, Enhertu, now receiving treatment with Kadcyla, on hold because of anemia.  Has known bone, liver, brain mets; has had radiation  Admitted with headache, left fail numbness, mild LLE weakness.  Had HRI with contrast that showed extensive disease, with left parasagittal, fronto parietal, bilateral hemispheric lesions, bilateral leptomeningeal enhancement.  Currently c/o significant headache, despite Decadron and Dilaudid and Methadone.  Denies c/o SOB, chest pain.  Has nausea.  Denies numbness tingling dysuria or hematuria    Reports anemia; has been requiring PRBC tx; Hb now 10.1 post transfusion  Reports headaches are better; awaiting pain management; no new neurologic deficits    10/4/2024 Speech is back to baseline off PCA Anxious for discharge has an aapt with DR Chapa on Monday    PAST MEDICAL & SURGICAL HISTORY:  H/O compression fracture of spine  Anxiety  Metastatic breast cancer  H/O pleural effusion  Pericardial effusion  S/P tonsillectomy  H/O chest tube placement 12/23/21  S/P pericardiocentesis 12/28/21    Allergies  pertuzumab (Other (Severe))  Perjeta (Other (Severe))    MEDICATIONS  (STANDING):  chlorhexidine 2% Cloths 1 Application(s) Topical daily  dexAMETHasone     Tablet 4 milliGRAM(s) Oral every 6 hours  FLUoxetine 80 milliGRAM(s) Oral at bedtime  furosemide    Tablet 40 milliGRAM(s) Oral daily  memantine 10 milliGRAM(s) Oral every 12 hours  methadone    Tablet 10 milliGRAM(s) Oral <User Schedule>  methadone    Tablet 15 milliGRAM(s) Oral <User Schedule>  methylphenidate 20 milliGRAM(s) Oral two times a day  octreotide  Injectable 100 MICROGram(s) SubCutaneous two times a day  pantoprazole    Tablet 40 milliGRAM(s) Oral every 12 hours  pregabalin 200 milliGRAM(s) Oral three times a day    MEDICATIONS  (PRN):  albuterol    90 MICROgram(s) HFA Inhaler 2 Puff(s) Inhalation every 6 hours PRN Shortness of Breath and/or Wheezing  diazepam    Tablet 2 milliGRAM(s) Oral at bedtime PRN insomnia  HYDROmorphone  Injectable 2 milliGRAM(s) IV Push every 3 hours PRN Severe Pain (7 - 10)  HYDROmorphone  Injectable 1 milliGRAM(s) IV Push every 4 hours PRN Breakthrough Pain  polyethylene glycol 3350 17 Gram(s) Oral daily PRN Constipation  simethicone 80 milliGRAM(s) Chew four times a day PRN Gas    Vital Signs Last 24 Hrs  T(C): 36.4 (04 Oct 2024 07:45), Max: 36.9 (04 Oct 2024 00:00)  T(F): 97.5 (04 Oct 2024 07:45), Max: 98.4 (04 Oct 2024 00:00)  HR: 67 (04 Oct 2024 07:45) (62 - 80)  BP: 116/71 (04 Oct 2024 07:45) (101/64 - 122/68)  BP(mean): --  RR: 18 (04 Oct 2024 07:45) (18 - 18)  SpO2: 97% (04 Oct 2024 07:45) (95% - 99%)    Parameters below as of 04 Oct 2024 07:45  Patient On (Oxygen Delivery Method): room air      PHYSICAL EXAM:  Constitutional:  awake, alert   Eyes: anicteric  Neck:Supple  Respiratory:Clear  Cardiovascular:RRR normal S1S2  Gastrointestinal: soft  Extremities:DENNEY  Neurological:Awake, alert, spont speech ; mild left sided facial assymetry      CBC                        12.3   2.69  )-----------( 151      ( 04 Oct 2024 09:37 )             39.6                                10.4   4.85  )-----------( 151      ( 02 Oct 2024 04:15 )             34.0                             9.7    7.20  )-----------( 164      ( 01 Oct 2024 05:24 )             32.1                           10.1   4.52  )-----------( 165      ( 30 Sep 2024 04:10 )             33.0                       9.6    2.86  )-----------( 130      ( 29 Sep 2024 06:25 )             31.5     CHEM    10-04    138  |  99  |  17.6  ----------------------------<  167[H]  3.5   |  27.0  |  0.57    Ca    6.9[L]      04 Oct 2024 09:37      10-02    142  |  103  |  14.2  ----------------------------<  137[H]  3.8   |  24.0  |  0.50    Ca    7.4[L]      02 Oct 2024 04:15      10-01    140  |  103  |  15.8  ----------------------------<  154[H]  3.9   |  26.0  |  0.44[L]    Ca    7.7[L]      01 Oct 2024 05:24  Mg     2.0     09-30 09-29    141  |  104  |  14.8  ----------------------------<  119[H]  4.0   |  28.0  |  0.61    Ca    8.3[L]      29 Sep 2024 06:25  Mg     2.0     09-29    TPro  5.2[L]  /  Alb  3.2[L]  /  TBili  0.8  /  DBili  x   /  AST  39[H]  /  ALT  19  /  AlkPhos  238[H]  09-28    PT/INR - ( 27 Sep 2024 20:55 )   PT: 13.8 sec;   INR: 1.19 ratio         PTT - ( 27 Sep 2024 20:55 )  PTT:39.7 sec  Urinalysis Basic - ( 29 Sep 2024 06:25 )    Color: x / Appearance: x / SG: x / pH: x  Gluc: 119 mg/dL / Ketone: x  / Bili: x / Urobili: x   Blood: x / Protein: x / Nitrite: x   Leuk Esterase: x / RBC: x / WBC x   Sq Epi: x / Non Sq Epi: x / Bacteria: x        RADIOLOGY & ADDITIONAL STUDIES:

## 2024-10-04 NOTE — PROGRESS NOTE ADULT - ASSESSMENT
A: 37 y/o female with h/o metastatic breast cancer , mets to brain presents to [facility] ER for new onset facial  numbness and droop admitted for stroke work up. MRI brain shows new hemorrhagic mets. Pain management consulted for pain management.    dPCA 0/0.3/8/6    cont home methadone 10/10//15    s/p LP yesterday  denies gamez      when due for discharge:  DC PCA  - Recommend starting dilaudid PO 4mg/8mg [id number] PRN mod/severe pain   A: 37 y/o female with h/o metastatic breast cancer , mets to brain presents to [facility] ER for new onset facial  numbness and droop admitted for stroke work up. MRI brain shows new hemorrhagic mets. Pain management consulted for pain management.        cont home methadone 10/10//15    cont  dilaudid PO 4mg/8mg [id number] PRN mod/severe pain        Pain controlled  Pain service will sign off  Please reconsult as needed

## 2024-10-04 NOTE — PROGRESS NOTE ADULT - SUBJECTIVE AND OBJECTIVE BOX
Interval Hx:  Patient seen during rounds  Patient reports pain to be controlled on current medications  Patient denies sedation with medications        Analgesic Dosing for past 24 hours reviewed as below:  ALPRAZolam   1 milliGRAM(s) Oral (10-03-24 @ 15:52)    FLUoxetine   80 milliGRAM(s) Oral (10-03-24 @ 22:02)    HYDROmorphone   Tablet   4 milliGRAM(s) Oral (10-04-24 @ 00:12)   4 milliGRAM(s) Oral (10-03-24 @ 18:24)    memantine   10 milliGRAM(s) Oral (10-04-24 @ 05:44)   10 milliGRAM(s) Oral (10-03-24 @ 18:16)    methadone    Tablet   15 milliGRAM(s) Oral (10-03-24 @ 22:02)    methadone    Tablet   10 milliGRAM(s) Oral (10-04-24 @ 05:44)   10 milliGRAM(s) Oral (10-03-24 @ 13:34)    methylphenidate   20 milliGRAM(s) Oral (10-03-24 @ 12:00)    methylphenidate   20 milliGRAM(s) Oral (10-04-24 @ 05:44)    pregabalin   200 milliGRAM(s) Oral (10-04-24 @ 05:44)   200 milliGRAM(s) Oral (10-03-24 @ 22:02)   200 milliGRAM(s) Oral (10-03-24 @ 13:34)          T(C): 36.4 (10-04-24 @ 07:45), Max: 36.9 (10-04-24 @ 00:00)  HR: 67 (10-04-24 @ 07:45) (57 - 95)  BP: 116/71 (10-04-24 @ 07:45) (101/64 - 126/86)  RR: 18 (10-04-24 @ 07:45) (17 - 18)  SpO2: 97% (10-04-24 @ 07:45) (95% - 99%)      10-03-24 @ 07:01  -  10-04-24 @ 07:00  --------------------------------------------------------  IN: 700 mL / OUT: 500 mL / NET: 200 mL        albuterol    90 MICROgram(s) HFA Inhaler 2 Puff(s) Inhalation every 6 hours PRN  ALPRAZolam 1 milliGRAM(s) Oral at bedtime PRN  chlorhexidine 2% Cloths 1 Application(s) Topical daily  dexAMETHasone     Tablet 4 milliGRAM(s) Oral every 6 hours  diazepam    Tablet 2 milliGRAM(s) Oral at bedtime PRN  FLUoxetine 80 milliGRAM(s) Oral at bedtime  furosemide    Tablet 20 milliGRAM(s) Oral daily  HYDROmorphone   Tablet 4 milliGRAM(s) Oral every 4 hours PRN  HYDROmorphone   Tablet 8 milliGRAM(s) Oral every 4 hours PRN  HYDROmorphone  Injectable 1 milliGRAM(s) IV Push every 4 hours PRN  memantine 10 milliGRAM(s) Oral every 12 hours  methadone    Tablet 10 milliGRAM(s) Oral <User Schedule>  methadone    Tablet 15 milliGRAM(s) Oral <User Schedule>  methylphenidate 20 milliGRAM(s) Oral <User Schedule>  methylphenidate 20 milliGRAM(s) Oral <User Schedule>  naloxone Injectable 0.1 milliGRAM(s) IV Push every 3 minutes PRN  octreotide  Injectable 100 MICROGram(s) SubCutaneous two times a day  ondansetron Injectable 4 milliGRAM(s) IV Push every 6 hours PRN  pantoprazole    Tablet 40 milliGRAM(s) Oral every 12 hours  polyethylene glycol 3350 17 Gram(s) Oral daily PRN  pregabalin 200 milliGRAM(s) Oral three times a day  simethicone 80 milliGRAM(s) Chew four times a day PRN                  Pain Service   [phone]

## 2024-10-07 LAB
CULTURE RESULTS: NO GROWTH — SIGNIFICANT CHANGE UP
SPECIMEN SOURCE: SIGNIFICANT CHANGE UP

## 2024-10-15 PROCEDURE — 87640 STAPH A DNA AMP PROBE: CPT

## 2024-10-15 PROCEDURE — P9040: CPT

## 2024-10-15 PROCEDURE — 87641 MR-STAPH DNA AMP PROBE: CPT

## 2024-10-15 PROCEDURE — 99285 EMERGENCY DEPT VISIT HI MDM: CPT | Mod: 25

## 2024-10-15 PROCEDURE — 96374 THER/PROPH/DIAG INJ IV PUSH: CPT

## 2024-10-15 PROCEDURE — 85025 COMPLETE CBC W/AUTO DIFF WBC: CPT

## 2024-10-15 PROCEDURE — 86901 BLOOD TYPING SEROLOGIC RH(D): CPT

## 2024-10-15 PROCEDURE — 36430 TRANSFUSION BLD/BLD COMPNT: CPT

## 2024-10-15 PROCEDURE — 86850 RBC ANTIBODY SCREEN: CPT

## 2024-10-15 PROCEDURE — 80053 COMPREHEN METABOLIC PANEL: CPT

## 2024-10-15 PROCEDURE — 80048 BASIC METABOLIC PNL TOTAL CA: CPT

## 2024-10-15 PROCEDURE — 85730 THROMBOPLASTIN TIME PARTIAL: CPT

## 2024-10-15 PROCEDURE — 84100 ASSAY OF PHOSPHORUS: CPT

## 2024-10-15 PROCEDURE — 85027 COMPLETE CBC AUTOMATED: CPT

## 2024-10-15 PROCEDURE — 36415 COLL VENOUS BLD VENIPUNCTURE: CPT

## 2024-10-15 PROCEDURE — 86922 COMPATIBILITY TEST ANTIGLOB: CPT

## 2024-10-15 PROCEDURE — 83735 ASSAY OF MAGNESIUM: CPT

## 2024-10-15 PROCEDURE — 85610 PROTHROMBIN TIME: CPT

## 2024-10-15 PROCEDURE — 76705 ECHO EXAM OF ABDOMEN: CPT

## 2024-10-15 PROCEDURE — 86900 BLOOD TYPING SEROLOGIC ABO: CPT

## 2024-10-15 PROCEDURE — 84702 CHORIONIC GONADOTROPIN TEST: CPT

## 2024-10-15 PROCEDURE — 96376 TX/PRO/DX INJ SAME DRUG ADON: CPT

## 2024-10-15 PROCEDURE — 82272 OCCULT BLD FECES 1-3 TESTS: CPT

## 2024-10-28 ENCOUNTER — INPATIENT (INPATIENT)
Facility: HOSPITAL | Age: 39
LOS: 1 days | Discharge: HOME | DRG: 812 | End: 2024-10-30
Attending: STUDENT IN AN ORGANIZED HEALTH CARE EDUCATION/TRAINING PROGRAM | Admitting: INTERNAL MEDICINE
Payer: MEDICARE

## 2024-10-28 VITALS
HEIGHT: 62 IN | SYSTOLIC BLOOD PRESSURE: 110 MMHG | HEART RATE: 119 BPM | OXYGEN SATURATION: 98 % | WEIGHT: 149.03 LBS | TEMPERATURE: 98 F | DIASTOLIC BLOOD PRESSURE: 63 MMHG | RESPIRATION RATE: 16 BRPM

## 2024-10-28 DIAGNOSIS — Z98.890 OTHER SPECIFIED POSTPROCEDURAL STATES: Chronic | ICD-10-CM

## 2024-10-28 DIAGNOSIS — Z90.89 ACQUIRED ABSENCE OF OTHER ORGANS: Chronic | ICD-10-CM

## 2024-10-28 DIAGNOSIS — D64.9 ANEMIA, UNSPECIFIED: ICD-10-CM

## 2024-10-28 LAB
ALBUMIN SERPL ELPH-MCNC: 2.8 G/DL — LOW (ref 3.3–5.2)
ALP SERPL-CCNC: 232 U/L — HIGH (ref 40–120)
ALT FLD-CCNC: 27 U/L — SIGNIFICANT CHANGE UP
ANION GAP SERPL CALC-SCNC: 13 MMOL/L — SIGNIFICANT CHANGE UP (ref 5–17)
ANISOCYTOSIS BLD QL: SLIGHT — SIGNIFICANT CHANGE UP
APTT BLD: 35.6 SEC — SIGNIFICANT CHANGE UP (ref 24.5–35.6)
AST SERPL-CCNC: 27 U/L — SIGNIFICANT CHANGE UP
BASOPHILS # BLD AUTO: 0 K/UL — SIGNIFICANT CHANGE UP (ref 0–0.2)
BASOPHILS NFR BLD AUTO: 0 % — SIGNIFICANT CHANGE UP (ref 0–2)
BILIRUB SERPL-MCNC: 0.5 MG/DL — SIGNIFICANT CHANGE UP (ref 0.4–2)
BLD GP AB SCN SERPL QL: SIGNIFICANT CHANGE UP
BUN SERPL-MCNC: 10.4 MG/DL — SIGNIFICANT CHANGE UP (ref 8–20)
CALCIUM SERPL-MCNC: 7.2 MG/DL — LOW (ref 8.4–10.5)
CHLORIDE SERPL-SCNC: 110 MMOL/L — HIGH (ref 96–108)
CO2 SERPL-SCNC: 20 MMOL/L — LOW (ref 22–29)
CREAT SERPL-MCNC: 0.43 MG/DL — LOW (ref 0.5–1.3)
DACRYOCYTES BLD QL SMEAR: SIGNIFICANT CHANGE UP
EGFR: 128 ML/MIN/1.73M2 — SIGNIFICANT CHANGE UP
EOSINOPHIL # BLD AUTO: 0 K/UL — SIGNIFICANT CHANGE UP (ref 0–0.5)
EOSINOPHIL NFR BLD AUTO: 0 % — SIGNIFICANT CHANGE UP (ref 0–6)
GIANT PLATELETS BLD QL SMEAR: PRESENT — SIGNIFICANT CHANGE UP
GLUCOSE SERPL-MCNC: 122 MG/DL — HIGH (ref 70–99)
HCG SERPL-ACNC: <4 MIU/ML — SIGNIFICANT CHANGE UP
HCT VFR BLD CALC: 17 % — CRITICAL LOW (ref 34.5–45)
HGB BLD-MCNC: 5.3 G/DL — CRITICAL LOW (ref 11.5–15.5)
HYPOCHROMIA BLD QL: SIGNIFICANT CHANGE UP
INR BLD: 1.14 RATIO — SIGNIFICANT CHANGE UP (ref 0.85–1.16)
LYMPHOCYTES # BLD AUTO: 0.62 K/UL — LOW (ref 1–3.3)
LYMPHOCYTES # BLD AUTO: 15.6 % — SIGNIFICANT CHANGE UP (ref 13–44)
MANUAL SMEAR VERIFICATION: SIGNIFICANT CHANGE UP
MCHC RBC-ENTMCNC: 30.1 PG — SIGNIFICANT CHANGE UP (ref 27–34)
MCHC RBC-ENTMCNC: 31.2 GM/DL — LOW (ref 32–36)
MCV RBC AUTO: 96.6 FL — SIGNIFICANT CHANGE UP (ref 80–100)
MONOCYTES # BLD AUTO: 0.31 K/UL — SIGNIFICANT CHANGE UP (ref 0–0.9)
MONOCYTES NFR BLD AUTO: 7.8 % — SIGNIFICANT CHANGE UP (ref 2–14)
MYELOCYTES NFR BLD: 0.9 % — HIGH (ref 0–0)
NEUTROPHILS # BLD AUTO: 3 K/UL — SIGNIFICANT CHANGE UP (ref 1.8–7.4)
NEUTROPHILS NFR BLD AUTO: 74.8 % — SIGNIFICANT CHANGE UP (ref 43–77)
NEUTS BAND # BLD: 0.9 % — SIGNIFICANT CHANGE UP (ref 0–8)
PLAT MORPH BLD: NORMAL — SIGNIFICANT CHANGE UP
PLATELET # BLD AUTO: 130 K/UL — LOW (ref 150–400)
POIKILOCYTOSIS BLD QL AUTO: SLIGHT — SIGNIFICANT CHANGE UP
POLYCHROMASIA BLD QL SMEAR: SIGNIFICANT CHANGE UP
POTASSIUM SERPL-MCNC: 3.4 MMOL/L — LOW (ref 3.5–5.3)
POTASSIUM SERPL-SCNC: 3.4 MMOL/L — LOW (ref 3.5–5.3)
PROT SERPL-MCNC: 4.9 G/DL — LOW (ref 6.6–8.7)
PROTHROM AB SERPL-ACNC: 12.9 SEC — SIGNIFICANT CHANGE UP (ref 9.9–13.4)
RBC # BLD: 1.76 M/UL — LOW (ref 3.8–5.2)
RBC # FLD: 19.4 % — HIGH (ref 10.3–14.5)
RBC BLD AUTO: ABNORMAL
SCHISTOCYTES BLD QL AUTO: SLIGHT — SIGNIFICANT CHANGE UP
SODIUM SERPL-SCNC: 143 MMOL/L — SIGNIFICANT CHANGE UP (ref 135–145)
WBC # BLD: 3.96 K/UL — SIGNIFICANT CHANGE UP (ref 3.8–10.5)
WBC # FLD AUTO: 3.96 K/UL — SIGNIFICANT CHANGE UP (ref 3.8–10.5)

## 2024-10-28 PROCEDURE — 99223 1ST HOSP IP/OBS HIGH 75: CPT

## 2024-10-28 PROCEDURE — 99285 EMERGENCY DEPT VISIT HI MDM: CPT

## 2024-10-28 RX ORDER — METHADONE HYDROCHLORIDE 10 MG/1
10 TABLET ORAL AT BEDTIME
Refills: 0 | Status: DISCONTINUED | OUTPATIENT
Start: 2024-10-28 | End: 2024-10-30

## 2024-10-28 RX ORDER — PREGABALIN 150 MG/1
200 CAPSULE ORAL THREE TIMES A DAY
Refills: 0 | Status: DISCONTINUED | OUTPATIENT
Start: 2024-10-28 | End: 2024-10-30

## 2024-10-28 RX ORDER — METHYLPHENIDATE HYDROCHLORIDE 27 MG/1
20 TABLET, EXTENDED RELEASE ORAL
Refills: 0 | Status: DISCONTINUED | OUTPATIENT
Start: 2024-10-28 | End: 2024-10-30

## 2024-10-28 RX ORDER — ACETAMINOPHEN 500 MG
650 TABLET ORAL EVERY 6 HOURS
Refills: 0 | Status: DISCONTINUED | OUTPATIENT
Start: 2024-10-28 | End: 2024-10-30

## 2024-10-28 RX ORDER — MAGNESIUM, ALUMINUM HYDROXIDE 200-200 MG
30 TABLET,CHEWABLE ORAL EVERY 4 HOURS
Refills: 0 | Status: DISCONTINUED | OUTPATIENT
Start: 2024-10-28 | End: 2024-10-30

## 2024-10-28 RX ORDER — HYDROMORPHONE HCL/0.9% NACL/PF 6 MG/30 ML
4 PATIENT CONTROLLED ANALGESIA SYRINGE INTRAVENOUS THREE TIMES A DAY
Refills: 0 | Status: DISCONTINUED | OUTPATIENT
Start: 2024-10-28 | End: 2024-10-30

## 2024-10-28 RX ORDER — OCTREOTIDE ACETATE 1000 UG/ML
100 INJECTION INTRAVENOUS; SUBCUTANEOUS EVERY 12 HOURS
Refills: 0 | Status: DISCONTINUED | OUTPATIENT
Start: 2024-10-28 | End: 2024-10-30

## 2024-10-28 RX ORDER — HYDROMORPHONE HCL/0.9% NACL/PF 6 MG/30 ML
8 PATIENT CONTROLLED ANALGESIA SYRINGE INTRAVENOUS THREE TIMES A DAY
Refills: 0 | Status: DISCONTINUED | OUTPATIENT
Start: 2024-10-28 | End: 2024-10-30

## 2024-10-28 RX ORDER — METHADONE HYDROCHLORIDE 10 MG/1
5 TABLET ORAL
Refills: 0 | Status: DISCONTINUED | OUTPATIENT
Start: 2024-10-28 | End: 2024-10-30

## 2024-10-28 RX ORDER — ONDANSETRON HYDROCHLORIDE 2 MG/ML
4 INJECTION, SOLUTION INTRAMUSCULAR; INTRAVENOUS EVERY 8 HOURS
Refills: 0 | Status: DISCONTINUED | OUTPATIENT
Start: 2024-10-28 | End: 2024-10-30

## 2024-10-28 RX ORDER — HYDROMORPHONE HCL/0.9% NACL/PF 6 MG/30 ML
4 PATIENT CONTROLLED ANALGESIA SYRINGE INTRAVENOUS ONCE
Refills: 0 | Status: DISCONTINUED | OUTPATIENT
Start: 2024-10-28 | End: 2024-10-28

## 2024-10-28 RX ORDER — PANTOPRAZOLE SODIUM 40 MG/1
40 TABLET, DELAYED RELEASE ORAL EVERY 12 HOURS
Refills: 0 | Status: DISCONTINUED | OUTPATIENT
Start: 2024-10-28 | End: 2024-10-30

## 2024-10-28 RX ORDER — FLUOXETINE HCL 10 MG
80 CAPSULE ORAL AT BEDTIME
Refills: 0 | Status: DISCONTINUED | OUTPATIENT
Start: 2024-10-28 | End: 2024-10-30

## 2024-10-28 RX ORDER — MELATONIN 5 MG
3 TABLET ORAL AT BEDTIME
Refills: 0 | Status: DISCONTINUED | OUTPATIENT
Start: 2024-10-28 | End: 2024-10-29

## 2024-10-28 RX ORDER — DIAZEPAM 10 MG/1
5 FILM BUCCAL
Refills: 0 | Status: DISCONTINUED | OUTPATIENT
Start: 2024-10-28 | End: 2024-10-30

## 2024-10-28 RX ADMIN — Medication 4 MILLIGRAM(S): at 17:35

## 2024-10-28 RX ADMIN — Medication 4 MILLIGRAM(S): at 18:05

## 2024-10-28 RX ADMIN — OCTREOTIDE ACETATE 100 MICROGRAM(S): 1000 INJECTION INTRAVENOUS; SUBCUTANEOUS at 23:00

## 2024-10-28 RX ADMIN — METHADONE HYDROCHLORIDE 10 MILLIGRAM(S): 10 TABLET ORAL at 23:00

## 2024-10-28 RX ADMIN — Medication 8 MILLIGRAM(S): at 21:15

## 2024-10-28 RX ADMIN — Medication 80 MILLIGRAM(S): at 23:00

## 2024-10-28 RX ADMIN — Medication 8 MILLIGRAM(S): at 22:00

## 2024-10-28 RX ADMIN — PREGABALIN 200 MILLIGRAM(S): 150 CAPSULE ORAL at 23:00

## 2024-10-28 NOTE — H&P ADULT - HISTORY OF PRESENT ILLNESS
39 yr old female with metastatic breast cancer, ER/OR neg, Her-2 pos, currently on chemotherapy, chronic pain, anxiety/depression, prior symptomatic anemia presents today for evaluation of low Hb. She states her Hb last week with oncologist was around 8. today was around 5 and was advised to come to the ED. She is experiencing mild palpitations. Recently given new chemotherapy, advised to hold for low hemoglobin. She has noted darker stools for past few days. No abdominal pain, nausea, vomiting, urinary complaints. Normal appetite. She was given Decadron early October when she was diagnosed with metastatic lesions in brain, now completed. On Octreotide BID. Prior EGDs this year, s/p APC.

## 2024-10-28 NOTE — ED ADULT NURSE NOTE - NSFALLUNIVINTERV_ED_ALL_ED
Bed/Stretcher in lowest position, wheels locked, appropriate side rails in place/Call bell, personal items and telephone in reach/Instruct patient to call for assistance before getting out of bed/chair/stretcher/Non-slip footwear applied when patient is off stretcher/[place] to call system/Physically safe environment - no spills, clutter or unnecessary equipment/Purposeful proactive rounding/Room/bathroom lighting operational, light cord in reach

## 2024-10-28 NOTE — ED ADULT NURSE NOTE - OBJECTIVE STATEMENT
Pt A&Ox4 presenting to the ED c/o low hemoglobin value. PMH stage 4 breast cancer. Pt denies chest pain, blurry vision, dizziness, palpitations. Pt endorses shortness of breath and weakness, Pt states "I was doing good on my new medication for the cancer but now my hemoglobin is so low again so my dr sent me in." Pt respirations are even and nonlabored and pt is NAD. Pt remains on continuos cardiac monitoring NSR HR 92,  98% RA, Bed locked and in lowest position with father at bedside.,

## 2024-10-28 NOTE — ED PROVIDER NOTE - PHYSICAL EXAMINATION
Gen: chronically ill appearing.  HEENT: Mucous membranes moist, pink conjunctivae, EOMI  CV: pulse ~100, nl s1/s2.  Resp: CTAB, normal rate and effort  GI: Abdomen soft, NT, ND. No rebound, no guarding  : No CVAT  Neuro: A&O x 3, moving all 4 extremities  MSK: No spine or joint tenderness to palpation  Skin: No rashes. intact and perfused.

## 2024-10-28 NOTE — ED PROVIDER NOTE - CLINICAL SUMMARY MEDICAL DECISION MAKING FREE TEXT BOX
Sandi: 38yo F with PMHx of Stage 4 Breast Cancer s/p on chemo sent by [facility] for hgb 5.7 when was 8.5 on thursday. started new treatment on thursday. has had dark stool. no a/c. gotten egd/cauterization over past few months for bleeding antrum. denies palpitations/cp/sob/abd pain. always feels weak. was told by oncologist to be seen by GI for likely scope. no other complaints. chronically ill appearing. HD stable. will check labs, consult gi/onc. likely admission for prbc/repeat scoping.

## 2024-10-28 NOTE — ED PROVIDER NOTE - OBJECTIVE STATEMENT
40yo F with PMHx of Stage 4 Breast Cancer s/p on chemo sent by [facility] for hgb 5.7 when was 8.5 on thursday. started new treatment on thursday. has had dark stool. no a/c. gotten egd/cauterization over past few months for bleeding antrum. denies palpitations/cp/sob/abd pain. always feels weak. was told by oncologist to be seen by GI for likely scope. no other complaints.

## 2024-10-28 NOTE — ED PROVIDER NOTE - PROGRESS NOTE DETAILS
Sandi: pt consented. 2 units prbc. heme/onc and gi consulted. admitted to medicine. HD stable. pulse ~100

## 2024-10-28 NOTE — H&P ADULT - RESPIRATORY
right chest wall chemo port/clear to auscultation bilaterally/no respiratory distress/airway patent/breath sounds equal/good air movement/respirations non-labored

## 2024-10-28 NOTE — H&P ADULT - ASSESSMENT
39 yr old female with metastatic breast cancer, ER/IN neg, Her-2 pos, currently on chemotherapy, chronic pain, anxiety/depression, prior symptomatic anemia presents today for evaluation of low Hb. She states her Hb last week with oncologist was around 8. today was around 5 and was advised to come to the ED. Labs reviewed.    1. Acute on chronic symptomatic anemia:  Receiving PRBC ordered by ED.  Repeat CBC post 2nd unit  Pantoprazole IV BID.  GI consulted by ED, to assess need for repeat EGD  Oncology evaluation in am  Continue Octreotide.    2. Metastatic breast cancer, chronic pain:  Oncology evaluation in am, follows [facility].  Medications reviewed and reconciled.    3. Anxiety/Depression:  Continue Diazepam, Fluoxetine.     4. DVT ppx:  SCDs    Discussed with patient plan of care

## 2024-10-28 NOTE — ED ADULT TRIAGE NOTE - CHIEF COMPLAINT QUOTE
Pt was sent in by her oncologist DIVINA for a hemoglobin of 5.7, pt states she was just 8.5 last week, pt is asymptomatic, pt states her port is already accessed from [facility] and she has paperwork for it, pt is not in distress at this time, denies any shortness of breath, difficulty breathing, palpitations. Pt state she has been fighting off a cold so it could be masking her symptoms.

## 2024-10-29 LAB
ALBUMIN SERPL ELPH-MCNC: 2.8 G/DL — LOW (ref 3.3–5.2)
ALP SERPL-CCNC: 209 U/L — HIGH (ref 40–120)
ALT FLD-CCNC: 25 U/L — SIGNIFICANT CHANGE UP
ANION GAP SERPL CALC-SCNC: 11 MMOL/L — SIGNIFICANT CHANGE UP (ref 5–17)
AST SERPL-CCNC: 28 U/L — SIGNIFICANT CHANGE UP
BASOPHILS # BLD AUTO: 0.01 K/UL — SIGNIFICANT CHANGE UP (ref 0–0.2)
BASOPHILS NFR BLD AUTO: 0.2 % — SIGNIFICANT CHANGE UP (ref 0–2)
BILIRUB SERPL-MCNC: 1 MG/DL — SIGNIFICANT CHANGE UP (ref 0.4–2)
BUN SERPL-MCNC: 6.8 MG/DL — LOW (ref 8–20)
CALCIUM SERPL-MCNC: 7.1 MG/DL — LOW (ref 8.4–10.5)
CHLORIDE SERPL-SCNC: 112 MMOL/L — HIGH (ref 96–108)
CO2 SERPL-SCNC: 21 MMOL/L — LOW (ref 22–29)
CREAT SERPL-MCNC: 0.4 MG/DL — LOW (ref 0.5–1.3)
EGFR: 130 ML/MIN/1.73M2 — SIGNIFICANT CHANGE UP
EOSINOPHIL # BLD AUTO: 0.05 K/UL — SIGNIFICANT CHANGE UP (ref 0–0.5)
EOSINOPHIL NFR BLD AUTO: 1.2 % — SIGNIFICANT CHANGE UP (ref 0–6)
GLUCOSE SERPL-MCNC: 114 MG/DL — HIGH (ref 70–99)
HCT VFR BLD CALC: 25 % — LOW (ref 34.5–45)
HGB BLD-MCNC: 8.1 G/DL — LOW (ref 11.5–15.5)
IMM GRANULOCYTES NFR BLD AUTO: 3.2 % — HIGH (ref 0–0.9)
INR BLD: 1.03 RATIO — SIGNIFICANT CHANGE UP (ref 0.85–1.16)
LYMPHOCYTES # BLD AUTO: 0.64 K/UL — LOW (ref 1–3.3)
LYMPHOCYTES # BLD AUTO: 15.7 % — SIGNIFICANT CHANGE UP (ref 13–44)
MCHC RBC-ENTMCNC: 30.7 PG — SIGNIFICANT CHANGE UP (ref 27–34)
MCHC RBC-ENTMCNC: 32.4 GM/DL — SIGNIFICANT CHANGE UP (ref 32–36)
MCV RBC AUTO: 94.7 FL — SIGNIFICANT CHANGE UP (ref 80–100)
MONOCYTES # BLD AUTO: 0.41 K/UL — SIGNIFICANT CHANGE UP (ref 0–0.9)
MONOCYTES NFR BLD AUTO: 10 % — SIGNIFICANT CHANGE UP (ref 2–14)
MRSA PCR RESULT.: SIGNIFICANT CHANGE UP
NEUTROPHILS # BLD AUTO: 2.84 K/UL — SIGNIFICANT CHANGE UP (ref 1.8–7.4)
NEUTROPHILS NFR BLD AUTO: 69.7 % — SIGNIFICANT CHANGE UP (ref 43–77)
NRBC # BLD: 2 /100 WBCS — HIGH (ref 0–0)
PLATELET # BLD AUTO: 153 K/UL — SIGNIFICANT CHANGE UP (ref 150–400)
POTASSIUM SERPL-MCNC: 3.8 MMOL/L — SIGNIFICANT CHANGE UP (ref 3.5–5.3)
POTASSIUM SERPL-SCNC: 3.8 MMOL/L — SIGNIFICANT CHANGE UP (ref 3.5–5.3)
PROT SERPL-MCNC: 4.8 G/DL — LOW (ref 6.6–8.7)
PROTHROM AB SERPL-ACNC: 12 SEC — SIGNIFICANT CHANGE UP (ref 9.9–13.4)
RBC # BLD: 2.64 M/UL — LOW (ref 3.8–5.2)
RBC # FLD: 16.9 % — HIGH (ref 10.3–14.5)
S AUREUS DNA NOSE QL NAA+PROBE: SIGNIFICANT CHANGE UP
SODIUM SERPL-SCNC: 144 MMOL/L — SIGNIFICANT CHANGE UP (ref 135–145)
WBC # BLD: 4.08 K/UL — SIGNIFICANT CHANGE UP (ref 3.8–10.5)
WBC # FLD AUTO: 4.08 K/UL — SIGNIFICANT CHANGE UP (ref 3.8–10.5)

## 2024-10-29 PROCEDURE — 71045 X-RAY EXAM CHEST 1 VIEW: CPT | Mod: 26

## 2024-10-29 PROCEDURE — 99222 1ST HOSP IP/OBS MODERATE 55: CPT

## 2024-10-29 PROCEDURE — 99233 SBSQ HOSP IP/OBS HIGH 50: CPT

## 2024-10-29 RX ORDER — MELATONIN 5 MG
5 TABLET ORAL AT BEDTIME
Refills: 0 | Status: DISCONTINUED | OUTPATIENT
Start: 2024-10-29 | End: 2024-10-30

## 2024-10-29 RX ORDER — POLYETHYLENE GLYCOL 3350, SODIUM SULFATE ANHYDROUS, SODIUM BICARBONATE, SODIUM CHLORIDE, POTASSIUM CHLORIDE 236; 22.74; 6.74; 5.86; 2.97 G/4L; G/4L; G/4L; G/4L; G/4L
4000 POWDER, FOR SOLUTION ORAL ONCE
Refills: 0 | Status: COMPLETED | OUTPATIENT
Start: 2024-10-29 | End: 2024-10-29

## 2024-10-29 RX ORDER — CHLORHEXIDINE GLUCONATE 40 MG/ML
1 SOLUTION TOPICAL DAILY
Refills: 0 | Status: DISCONTINUED | OUTPATIENT
Start: 2024-10-29 | End: 2024-10-30

## 2024-10-29 RX ORDER — HYDROMORPHONE HCL/0.9% NACL/PF 6 MG/30 ML
0.5 PATIENT CONTROLLED ANALGESIA SYRINGE INTRAVENOUS ONCE
Refills: 0 | Status: DISCONTINUED | OUTPATIENT
Start: 2024-10-29 | End: 2024-10-30

## 2024-10-29 RX ORDER — MEMANTINE HYDROCHLORIDE 21 MG/1
10 CAPSULE, EXTENDED RELEASE ORAL
Refills: 0 | Status: DISCONTINUED | OUTPATIENT
Start: 2024-10-29 | End: 2024-10-30

## 2024-10-29 RX ADMIN — PREGABALIN 200 MILLIGRAM(S): 150 CAPSULE ORAL at 05:25

## 2024-10-29 RX ADMIN — Medication 650 MILLIGRAM(S): at 21:58

## 2024-10-29 RX ADMIN — METHADONE HYDROCHLORIDE 5 MILLIGRAM(S): 10 TABLET ORAL at 13:07

## 2024-10-29 RX ADMIN — PREGABALIN 200 MILLIGRAM(S): 150 CAPSULE ORAL at 13:07

## 2024-10-29 RX ADMIN — PANTOPRAZOLE SODIUM 40 MILLIGRAM(S): 40 TABLET, DELAYED RELEASE ORAL at 17:50

## 2024-10-29 RX ADMIN — Medication 5 MILLIGRAM(S): at 22:02

## 2024-10-29 RX ADMIN — Medication 8 MILLIGRAM(S): at 16:17

## 2024-10-29 RX ADMIN — Medication 8 MILLIGRAM(S): at 15:00

## 2024-10-29 RX ADMIN — PANTOPRAZOLE SODIUM 40 MILLIGRAM(S): 40 TABLET, DELAYED RELEASE ORAL at 05:25

## 2024-10-29 RX ADMIN — METHADONE HYDROCHLORIDE 10 MILLIGRAM(S): 10 TABLET ORAL at 22:02

## 2024-10-29 RX ADMIN — OCTREOTIDE ACETATE 100 MICROGRAM(S): 1000 INJECTION INTRAVENOUS; SUBCUTANEOUS at 05:25

## 2024-10-29 RX ADMIN — MEMANTINE HYDROCHLORIDE 10 MILLIGRAM(S): 21 CAPSULE, EXTENDED RELEASE ORAL at 17:50

## 2024-10-29 RX ADMIN — METHYLPHENIDATE HYDROCHLORIDE 20 MILLIGRAM(S): 27 TABLET, EXTENDED RELEASE ORAL at 13:07

## 2024-10-29 RX ADMIN — Medication 650 MILLIGRAM(S): at 20:58

## 2024-10-29 RX ADMIN — METHYLPHENIDATE HYDROCHLORIDE 20 MILLIGRAM(S): 27 TABLET, EXTENDED RELEASE ORAL at 05:57

## 2024-10-29 RX ADMIN — POLYETHYLENE GLYCOL 3350, SODIUM SULFATE ANHYDROUS, SODIUM BICARBONATE, SODIUM CHLORIDE, POTASSIUM CHLORIDE 4000 MILLILITER(S): 236; 22.74; 6.74; 5.86; 2.97 POWDER, FOR SOLUTION ORAL at 16:17

## 2024-10-29 RX ADMIN — PREGABALIN 200 MILLIGRAM(S): 150 CAPSULE ORAL at 22:02

## 2024-10-29 RX ADMIN — Medication 8 MILLIGRAM(S): at 23:03

## 2024-10-29 RX ADMIN — OCTREOTIDE ACETATE 100 MICROGRAM(S): 1000 INJECTION INTRAVENOUS; SUBCUTANEOUS at 17:50

## 2024-10-29 RX ADMIN — METHADONE HYDROCHLORIDE 5 MILLIGRAM(S): 10 TABLET ORAL at 08:22

## 2024-10-29 RX ADMIN — Medication 80 MILLIGRAM(S): at 22:02

## 2024-10-29 RX ADMIN — Medication 8 MILLIGRAM(S): at 07:30

## 2024-10-29 RX ADMIN — Medication 8 MILLIGRAM(S): at 06:29

## 2024-10-29 NOTE — PROGRESS NOTE ADULT - ASSESSMENT
38 yo F who follows Dr Chapa at [facility] for a history of metastatic breast cancer, ER/[place] neg, Her-2 pos, originally diagnosed 10/21, treated with Taxane/Herceptin, Enhertu, Has known bone, liver, brain mets; has had radiation Currently on Xeloda, Tucatinib and trastuzanab LD 10/24  presents today for evaluation of low Hb. She states her Hb last week with oncologist was around 8. today was around 5 and was advised to come to the ED     Acute on chronic symptomatic anemia:  - hgb 5.3  - S/P 2 uPRBC Now 8.1  - Pantoprazole IV BID.  - GI consulted by ED, to assess need for repeat EGD  - Continue Octreotide.    Metastatic breast cancer  - follows Dr Chapa at [facility]  - Widely met Breast cancer to liver, bone, brain  - S/P multiple treatment regimens and RT   -Currently on Xeloda, Tucatinib and trastuzanab LD 10/24     Will follow

## 2024-10-29 NOTE — PROGRESS NOTE ADULT - SUBJECTIVE AND OBJECTIVE BOX
NATIVIDAD MYERS    [id number]    39y      Female    INTERVAL HPI/OVERNIGHT EVENTS:  patient being seen for anemia. patient is s.p 2 units prbcs and seen at bedside and is without complaints.     patient seen by GI and is for scope in am.       REVIEW OF SYSTEMS:    CONSTITUTIONAL: No fever, weight loss, or fatigue  RESPIRATORY: No cough, wheezing, hemoptysis; No shortness of breath  CARDIOVASCULAR: No chest pain, palpitations  GASTROINTESTINAL: No abdominal or epigastric pain. No nausea, vomiting  NEUROLOGICAL: No headaches, memory loss, loss of strength.  MISCELLANEOUS:      Vital Signs Last 24 Hrs  T(C): 36.7 (29 Oct 2024 03:09), Max: 37 (28 Oct 2024 19:15)  T(F): 98 (29 Oct 2024 03:09), Max: 98.6 (28 Oct 2024 19:15)  HR: 85 (29 Oct 2024 03:09) (85 - 119)  BP: 96/66 (29 Oct 2024 03:09) (94/61 - 110/63)  BP(mean): --  RR: 19 (29 Oct 2024 03:09) (16 - 19)  SpO2: 93% (29 Oct 2024 03:09) (93% - 98%)    Parameters below as of 29 Oct 2024 03:09  Patient On (Oxygen Delivery Method): room air      PE  · Constitutional	well-groomed; no distress  · Eyes	conjunctiva clear; palor  · Respiratory	clear to auscultation bilaterally; no respiratory distress; airway patent; breath sounds equal; good air movement; respirations non-labored; right chest wall chemo port  · Cardiovascular	regular rate and rhythm  · Gastrointestinal	soft; nontender; nondistended; normal active bowel sounds  · Neurological	sensation intact; responds to pain; responds to verbal commands  · Mental Status	alert, oriented x 3  · Motor	moving all extremities  · Skin	pale  · Musculoskeletal	ROM intact; strength 5/5 bilateral upper extremities; strength 5/5 bilateral lower extremities  · Psychiatric	normal affect; alert and oriented x3        LABS:                        8.1    4.08  )-----------( 153      ( 29 Oct 2024 06:00 )             25.0     10-29    144  |  112[H]  |  6.8[L]  ----------------------------<  114[H]  3.8   |  21.0[L]  |  0.40[L]    Ca    7.1[L]      29 Oct 2024 06:00    TPro  4.8[L]  /  Alb  2.8[L]  /  TBili  1.0  /  DBili  x   /  AST  28  /  ALT  25  /  AlkPhos  209[H]  10-29    PT/INR - ( 29 Oct 2024 06:00 )   PT: 12.0 sec;   INR: 1.03 ratio         PTT - ( 28 Oct 2024 17:00 )  PTT:35.6 sec  Urinalysis Basic - ( 29 Oct 2024 06:00 )    Color: x / Appearance: x / SG: x / pH: x  Gluc: 114 mg/dL / Ketone: x  / Bili: x / Urobili: x   Blood: x / Protein: x / Nitrite: x   Leuk Esterase: x / RBC: x / WBC x   Sq Epi: x / Non Sq Epi: x / Bacteria: x      MEDICATIONS  (STANDING):  FLUoxetine 80 milliGRAM(s) Oral at bedtime  melatonin 5 milliGRAM(s) Oral at bedtime  methadone    Tablet 10 milliGRAM(s) Oral at bedtime  methadone    Tablet 5 milliGRAM(s) Oral <User Schedule>  methylphenidate 20 milliGRAM(s) Oral two times a day  octreotide  Injectable 100 MICROGram(s) SubCutaneous every 12 hours  pantoprazole  Injectable 40 milliGRAM(s) IV Push every 12 hours  polyethylene glycol/electrolyte Solution. 4000 milliLiter(s) Oral once  pregabalin 200 milliGRAM(s) Oral three times a day    MEDICATIONS  (PRN):  acetaminophen     Tablet .. 650 milliGRAM(s) Oral every 6 hours PRN Temp greater or equal to 38C (100.4F), Mild Pain (1 - 3)  aluminum hydroxide/magnesium hydroxide/simethicone Suspension 30 milliLiter(s) Oral every 4 hours PRN Dyspepsia  diazepam    Tablet 5 milliGRAM(s) Oral two times a day PRN anxiety/muscle spasm  hydrocodone/homatropine Syrup 5 milliLiter(s) Oral every 6 hours PRN Cough  HYDROmorphone   Tablet 4 milliGRAM(s) Oral three times a day PRN for moderate pain  HYDROmorphone   Tablet 8 milliGRAM(s) Oral three times a day PRN Severe Pain (7 - 10)  ondansetron Injectable 4 milliGRAM(s) IV Push every 8 hours PRN Nausea and/or Vomiting      RADIOLOGY & ADDITIONAL TESTS:

## 2024-10-29 NOTE — PROGRESS NOTE ADULT - SUBJECTIVE AND OBJECTIVE BOX
39yo F who follows Dr Chapa at [facility] for a history of metastatic breast cancer, ER/[place] neg, Her-2 pos, originally diagnosed 10/21, treated with Taxane/Herceptin, Enhertu, Has known bone, liver, brain mets; has had radiation Currently on Xeloda, Tucatinib and trastuzanab LD 10/24  presents today for evaluation of low Hb. She states her Hb last week with oncologist was around 8. today was around 5 and was advised to come to the ED. She is experiencing mild palpitations. Recently given new chemotherapy, advised to hold for low hemoglobin. She has noted darker stools for past few days. No abdominal pain, nausea, vomiting, urinary complaints. Normal appetite. She was given Decadron early October when she was diagnosed with metastatic lesions in brain, now completed. On Octreotide BID. Prior EGDs this year, s/p APC.    PAST MEDICAL & SURGICAL HISTORY:  H/O compression fracture of spine  Anxiety  Metastatic breast cancer  H/O pleural effusion  Pericardial effusion  S/P tonsillectomy  H/O chest tube placement  12/23/21  S/P pericardiocentesis  12/28/21    Allergies  Perjeta (Other (Severe))  pertuzumab (Other (Severe))    MEDICATIONS  (STANDING):  FLUoxetine 80 milliGRAM(s) Oral at bedtime  melatonin 5 milliGRAM(s) Oral at bedtime  methadone    Tablet 5 milliGRAM(s) Oral <User Schedule>  methadone    Tablet 10 milliGRAM(s) Oral at bedtime  methylphenidate 20 milliGRAM(s) Oral two times a day  octreotide  Injectable 100 MICROGram(s) SubCutaneous every 12 hours  pantoprazole  Injectable 40 milliGRAM(s) IV Push every 12 hours  polyethylene glycol/electrolyte Solution. 4000 milliLiter(s) Oral once  pregabalin 200 milliGRAM(s) Oral three times a day    MEDICATIONS  (PRN):  acetaminophen     Tablet .. 650 milliGRAM(s) Oral every 6 hours PRN Temp greater or equal to 38C (100.4F), Mild Pain (1 - 3)  aluminum hydroxide/magnesium hydroxide/simethicone Suspension 30 milliLiter(s) Oral every 4 hours PRN Dyspepsia  diazepam    Tablet 5 milliGRAM(s) Oral two times a day PRN anxiety/muscle spasm  HYDROmorphone   Tablet 4 milliGRAM(s) Oral three times a day PRN for moderate pain  HYDROmorphone   Tablet 8 milliGRAM(s) Oral three times a day PRN Severe Pain (7 - 10)  ondansetron Injectable 4 milliGRAM(s) IV Push every 8 hours PRN Nausea and/or Vomiting    Vital Signs Last 24 Hrs  T(C): 36.7 (29 Oct 2024 03:09), Max: 37 (28 Oct 2024 19:15)  T(F): 98 (29 Oct 2024 03:09), Max: 98.6 (28 Oct 2024 19:15)  HR: 85 (29 Oct 2024 03:09) (85 - 119)  BP: 96/66 (29 Oct 2024 03:09) (94/61 - 110/63)  BP(mean): --  RR: 19 (29 Oct 2024 03:09) (16 - 19)  SpO2: 93% (29 Oct 2024 03:09) (93% - 98%)    Parameters below as of 29 Oct 2024 03:09  Patient On (Oxygen Delivery Method): room air    PE  · Constitutional	well-groomed; no distress  · Eyes	conjunctiva clear; palor  · Respiratory	clear to auscultation bilaterally; no respiratory distress;  rt  · Cardiovascular	regular rate and rhythm  · Gastrointestinal	soft   · Neurological	sensation intact;   · Mental Status	alert, oriented x 3  · Motor	moving all extremities  · Skin	pale  · Musculoskeletal	DEN[place]  · Psychiatric	normal affect; alert and oriented x3    CBC                        8.1    4.08  )-----------( 153      ( 29 Oct 2024 06:00 )             25.0     CHEM    10-29    144  |  112[H]  |  6.8[L]  ----------------------------<  114[H]  3.8   |  21.0[L]  |  0.40[L]    Ca    7.1[L]      29 Oct 2024 06:00    TPro  4.8[L]  /  Alb  2.8[L]  /  TBili  1.0  /  DBili  x   /  AST  28  /  ALT  25  /  AlkPhos  209[H]  10-29

## 2024-10-29 NOTE — PATIENT PROFILE ADULT - FALL HARM RISK - HARM RISK INTERVENTIONS

## 2024-10-29 NOTE — PROGRESS NOTE ADULT - ASSESSMENT
39 yr old female with metastatic breast cancer, ER/[place] neg, Her-2 pos, currently on chemotherapy, chronic pain, anxiety/depression, prior symptomatic anemia presents for evaluation of low Hb. patient admitted to medicine and seen by GI and is for scope in am. Patient given 2 units prbcs with improvement of anemia.     #Acute on chronic symptomatic anemia:  Pantoprazole IV BID.  GI consult appreciated  Oncology evaluation called  Continue Octreotide.    # Metastatic breast cancer, chronic pain:  Oncology evaluation   Medications reviewed and reconciled.    #. Anxiety/Depression:  Continue Diazepam, Fluoxetine.     #cough/sob - xray     #diet - clears  -npo pmn     #DVT ppx:  SCDs

## 2024-10-29 NOTE — CONSULT NOTE ADULT - NS ATTEND AMEND GEN_ALL_CORE FT
I evaluated this pt with my ACP and agree with the above assessment and management plan. For repeat EGD and colonoscopy tomorrow. Her drop in Hb may be multifactorial secondary to a combination of her new chemotherapy and ABLA. Prep orders written. CLD today. NPO after MN tonight. Repeat labs ordered for the AM.

## 2024-10-29 NOTE — CONSULT NOTE ADULT - ASSESSMENT
39 yr old female with metastatic breast cancer, ER/[place] neg, Her-2 pos, currently on chemotherapy, chronic pain, anxiety/depression, prior symptomatic anemia presents today for evaluation of low Hb. GI asked to consult for anemia.    #anemia   #metastatic breast cancer  hgb on arrival to ER was 5.3, she received 2 units PRBC, repeat hgb is 8.1  -Trend CBC daily, transfuse prn hgb <7, monitor for further bleeding   -PPI BID for GI mucosal cytoprotection  -avoid NSAIDs  -CLD today   -NPO after MN  -will plan for EGD/colonoscopy tomorrow 10/30/24  -Maintain current T&S, INR <1.5, Hgb >7.0, Plt >50 prior to procedure  _________________________________________________________________  Assessment and recommendations are final when note is signed by the attending physician.

## 2024-10-29 NOTE — CONSULT NOTE ADULT - SUBJECTIVE AND OBJECTIVE BOX
Chief Complaint:  Patient is a 39y old  Female who presents with a chief complaint of low hemoglobin (28 Oct 2024 20:57)      HPI: 39 yr old female with metastatic breast cancer, ER/[place] neg, Her-2 pos, currently on chemotherapy, chronic pain, anxiety/depression, prior symptomatic anemia presents today for evaluation of low Hb. GI asked to consult for anemia. She states her Hb last week with oncologist was 8.7 and then yesterday it dropped to 5.7. OF note she was started on a new chemo therapy agent last Thursday, 10/24/25. Her oncologist sent her to ER to r/o GI bleed. She notes for the past 3 days she has noted darker stools. Pt has been seen by us multiple times in the past for similar presentation. She most recently had EGD/Flex sigmoidoscopy (9/6/24) showing erosive gastritis and hemorrhoids. She denies nausea, vomiting, abdominal pain, diarrhea, constipation or other GI complaints. Her last colonoscopy was years ago.           PAST MEDICAL & SURGICAL HISTORY:  H/O compression fracture of spine      Anxiety      Metastatic breast cancer      H/O pleural effusion      Pericardial effusion      S/P tonsillectomy      H/O chest tube placement  12/23/21      S/P pericardiocentesis  12/28/21          REVIEW OF SYSTEMS:   General: Negative  HEENT: Negative  CV: Negative  Respiratory: Negative  GI: See HPI  : Negative  MSK: Negative  Hematologic: Negative  Skin: Negative    MEDICATIONS:   MEDICATIONS  (STANDING):  FLUoxetine 80 milliGRAM(s) Oral at bedtime  methadone    Tablet 10 milliGRAM(s) Oral at bedtime  methadone    Tablet 5 milliGRAM(s) Oral <User Schedule>  methylphenidate 20 milliGRAM(s) Oral two times a day  octreotide  Injectable 100 MICROGram(s) SubCutaneous every 12 hours  pantoprazole  Injectable 40 milliGRAM(s) IV Push every 12 hours  pregabalin 200 milliGRAM(s) Oral three times a day    MEDICATIONS  (PRN):  acetaminophen     Tablet .. 650 milliGRAM(s) Oral every 6 hours PRN Temp greater or equal to 38C (100.4F), Mild Pain (1 - 3)  aluminum hydroxide/magnesium hydroxide/simethicone Suspension 30 milliLiter(s) Oral every 4 hours PRN Dyspepsia  diazepam    Tablet 5 milliGRAM(s) Oral two times a day PRN anxiety/muscle spasm  HYDROmorphone   Tablet 4 milliGRAM(s) Oral three times a day PRN for moderate pain  HYDROmorphone   Tablet 8 milliGRAM(s) Oral three times a day PRN Severe Pain (7 - 10)  melatonin 3 milliGRAM(s) Oral at bedtime PRN Insomnia  ondansetron Injectable 4 milliGRAM(s) IV Push every 8 hours PRN Nausea and/or Vomiting      Packed Red Cells Order:  1 Unit  Indication: Hgb <7 gm/dL  Infuse Unit : 3 Hours (10-28-24 @ 17:36)  Packed Red Cells Order:  1 Unit  Indication: Hgb <7 gm/dL  Infuse Unit : 3 Hours (10-28-24 @ 17:36)      DIET:  Diet, NPO:   With Ice Chips/Sips of Water (10-28-24 @ 20:43) [Active]          ALLERGIES:   Allergies    Perjeta (Other (Severe))  pertuzumab (Other (Severe))    Intolerances        Substance Use:   (  ) never used  (  ) other:  Tobacco Usage:  (   ) never smoked   (   ) former smoker   (   ) current smoker  (     ) pack year  (        ) last cigarette date  Alcohol Usage:    Family History   IBD (  ) Yes   (  ) No  GI Malignancy (  )  Yes    (  ) No    Health Management  Last Colonoscopy:  Last Endoscopy:     VITAL SIGNS:   Vital Signs Last 24 Hrs  T(C): 36.7 (29 Oct 2024 03:09), Max: 37 (28 Oct 2024 19:15)  T(F): 98 (29 Oct 2024 03:09), Max: 98.6 (28 Oct 2024 19:15)  HR: 85 (29 Oct 2024 03:09) (85 - 119)  BP: 96/66 (29 Oct 2024 03:09) (94/61 - 110/63)  BP(mean): --  RR: 19 (29 Oct 2024 03:09) (16 - 19)  SpO2: 93% (29 Oct 2024 03:09) (93% - 98%)    Parameters below as of 29 Oct 2024 03:09  Patient On (Oxygen Delivery Method): room air      I&O's Summary      PHYSICAL EXAM:   GENERAL:  No acute distress  HEENT:  NC/AT, conjunctiva clear, sclera anicteric  CHEST:  No increased effort  HEART:  Regular rate  ABDOMEN:  Soft, non-tender, non-distended, normoactive bowel sounds, no rebound or guarding  EXTREMITIES: No edema  SKIN:  Warm, dry  NEURO:  Calm, cooperative    LABS:                        8.1    4.08  )-----------( 153      ( 29 Oct 2024 06:00 )             25.0     Hemoglobin: 8.1 g/dL (10-29-24 @ 06:00)  Hemoglobin: 5.3 g/dL (10-28-24 @ 17:00)    10-29    144  |  112[H]  |  6.8[L]  ----------------------------<  114[H]  3.8   |  21.0[L]  |  0.40[L]    Ca    7.1[L]      29 Oct 2024 06:00    TPro  4.8[L]  /  Alb  2.8[L]  /  TBili  1.0  /  DBili  x   /  AST  28  /  ALT  25  /  AlkPhos  209[H]  10-29    LIVER FUNCTIONS - ( 29 Oct 2024 06:00 )  Alb: 2.8 g/dL / Pro: 4.8 g/dL / ALK PHOS: 209 U/L / ALT: 25 U/L / AST: 28 U/L / GGT: x             PT/INR - ( 29 Oct 2024 06:00 )   PT: 12.0 sec;   INR: 1.03 ratio         PTT - ( 28 Oct 2024 17:00 )  PTT:35.6 sec                                      RADIOLOGY & ADDITIONAL STUDIES:

## 2024-10-30 ENCOUNTER — TRANSCRIPTION ENCOUNTER (OUTPATIENT)
Age: 39
End: 2024-10-30

## 2024-10-30 ENCOUNTER — RESULT REVIEW (OUTPATIENT)
Age: 39
End: 2024-10-30

## 2024-10-30 VITALS
SYSTOLIC BLOOD PRESSURE: 109 MMHG | RESPIRATION RATE: 18 BRPM | DIASTOLIC BLOOD PRESSURE: 75 MMHG | TEMPERATURE: 98 F | OXYGEN SATURATION: 95 % | HEART RATE: 106 BPM

## 2024-10-30 LAB
ALBUMIN SERPL ELPH-MCNC: 2.9 G/DL — LOW (ref 3.3–5.2)
ALP SERPL-CCNC: 194 U/L — HIGH (ref 40–120)
ALT FLD-CCNC: 21 U/L — SIGNIFICANT CHANGE UP
AMMONIA BLD-MCNC: 31 UMOL/L — SIGNIFICANT CHANGE UP (ref 11–55)
ANION GAP SERPL CALC-SCNC: 13 MMOL/L — SIGNIFICANT CHANGE UP (ref 5–17)
AST SERPL-CCNC: 25 U/L — SIGNIFICANT CHANGE UP
BASOPHILS # BLD AUTO: 0.01 K/UL — SIGNIFICANT CHANGE UP (ref 0–0.2)
BASOPHILS NFR BLD AUTO: 0.3 % — SIGNIFICANT CHANGE UP (ref 0–2)
BILIRUB SERPL-MCNC: 0.9 MG/DL — SIGNIFICANT CHANGE UP (ref 0.4–2)
BUN SERPL-MCNC: 4.5 MG/DL — LOW (ref 8–20)
CALCIUM SERPL-MCNC: 6.9 MG/DL — LOW (ref 8.4–10.5)
CHLORIDE SERPL-SCNC: 109 MMOL/L — HIGH (ref 96–108)
CO2 SERPL-SCNC: 23 MMOL/L — SIGNIFICANT CHANGE UP (ref 22–29)
CREAT SERPL-MCNC: 0.38 MG/DL — LOW (ref 0.5–1.3)
EGFR: 131 ML/MIN/1.73M2 — SIGNIFICANT CHANGE UP
EOSINOPHIL # BLD AUTO: 0.04 K/UL — SIGNIFICANT CHANGE UP (ref 0–0.5)
EOSINOPHIL NFR BLD AUTO: 1.2 % — SIGNIFICANT CHANGE UP (ref 0–6)
GLUCOSE SERPL-MCNC: 102 MG/DL — HIGH (ref 70–99)
HCT VFR BLD CALC: 24.5 % — LOW (ref 34.5–45)
HCT VFR BLD CALC: 26 % — LOW (ref 34.5–45)
HGB BLD-MCNC: 7.9 G/DL — LOW (ref 11.5–15.5)
HGB BLD-MCNC: 8.5 G/DL — LOW (ref 11.5–15.5)
IMM GRANULOCYTES NFR BLD AUTO: 1.8 % — HIGH (ref 0–0.9)
INR BLD: 1.09 RATIO — SIGNIFICANT CHANGE UP (ref 0.85–1.16)
LYMPHOCYTES # BLD AUTO: 0.49 K/UL — LOW (ref 1–3.3)
LYMPHOCYTES # BLD AUTO: 14.9 % — SIGNIFICANT CHANGE UP (ref 13–44)
MAGNESIUM SERPL-MCNC: 1.8 MG/DL — SIGNIFICANT CHANGE UP (ref 1.6–2.6)
MCHC RBC-ENTMCNC: 30.5 PG — SIGNIFICANT CHANGE UP (ref 27–34)
MCHC RBC-ENTMCNC: 30.9 PG — SIGNIFICANT CHANGE UP (ref 27–34)
MCHC RBC-ENTMCNC: 32.2 G/DL — SIGNIFICANT CHANGE UP (ref 32–36)
MCHC RBC-ENTMCNC: 32.7 G/DL — SIGNIFICANT CHANGE UP (ref 32–36)
MCV RBC AUTO: 94.5 FL — SIGNIFICANT CHANGE UP (ref 80–100)
MCV RBC AUTO: 94.6 FL — SIGNIFICANT CHANGE UP (ref 80–100)
MONOCYTES # BLD AUTO: 0.45 K/UL — SIGNIFICANT CHANGE UP (ref 0–0.9)
MONOCYTES NFR BLD AUTO: 13.7 % — SIGNIFICANT CHANGE UP (ref 2–14)
NEUTROPHILS # BLD AUTO: 2.24 K/UL — SIGNIFICANT CHANGE UP (ref 1.8–7.4)
NEUTROPHILS NFR BLD AUTO: 68.1 % — SIGNIFICANT CHANGE UP (ref 43–77)
NRBC # BLD: 1 /100 WBCS — HIGH (ref 0–0)
NRBC # BLD: 2 /100 WBCS — HIGH (ref 0–0)
PLATELET # BLD AUTO: 152 K/UL — SIGNIFICANT CHANGE UP (ref 150–400)
PLATELET # BLD AUTO: 158 K/UL — SIGNIFICANT CHANGE UP (ref 150–400)
POTASSIUM SERPL-MCNC: 3.3 MMOL/L — LOW (ref 3.5–5.3)
POTASSIUM SERPL-SCNC: 3.3 MMOL/L — LOW (ref 3.5–5.3)
PROT SERPL-MCNC: 4.9 G/DL — LOW (ref 6.6–8.7)
PROTHROM AB SERPL-ACNC: 12.3 SEC — SIGNIFICANT CHANGE UP (ref 9.9–13.4)
RBC # BLD: 2.59 M/UL — LOW (ref 3.8–5.2)
RBC # BLD: 2.75 M/UL — LOW (ref 3.8–5.2)
RBC # FLD: 17.5 % — HIGH (ref 10.3–14.5)
RBC # FLD: 18 % — HIGH (ref 10.3–14.5)
SODIUM SERPL-SCNC: 145 MMOL/L — SIGNIFICANT CHANGE UP (ref 135–145)
WBC # BLD: 3.29 K/UL — LOW (ref 3.8–10.5)
WBC # BLD: 4.2 K/UL — SIGNIFICANT CHANGE UP (ref 3.8–10.5)
WBC # FLD AUTO: 3.29 K/UL — LOW (ref 3.8–10.5)
WBC # FLD AUTO: 4.2 K/UL — SIGNIFICANT CHANGE UP (ref 3.8–10.5)

## 2024-10-30 PROCEDURE — 45378 DIAGNOSTIC COLONOSCOPY: CPT

## 2024-10-30 PROCEDURE — 88342 IMHCHEM/IMCYTCHM 1ST ANTB: CPT | Mod: 26

## 2024-10-30 PROCEDURE — 43239 EGD BIOPSY SINGLE/MULTIPLE: CPT

## 2024-10-30 PROCEDURE — 99239 HOSP IP/OBS DSCHRG MGMT >30: CPT

## 2024-10-30 PROCEDURE — 88305 TISSUE EXAM BY PATHOLOGIST: CPT | Mod: 26

## 2024-10-30 RX ORDER — POTASSIUM CHLORIDE 10 MEQ
40 TABLET, EXTENDED RELEASE ORAL ONCE
Refills: 0 | Status: COMPLETED | OUTPATIENT
Start: 2024-10-30 | End: 2024-10-30

## 2024-10-30 RX ORDER — SUCRALFATE 1 G/10ML
1 SUSPENSION ORAL
Qty: 120 | Refills: 0
Start: 2024-10-30 | End: 2024-11-28

## 2024-10-30 RX ORDER — POTASSIUM CHLORIDE 10 MEQ
10 TABLET, EXTENDED RELEASE ORAL
Refills: 0 | Status: DISCONTINUED | OUTPATIENT
Start: 2024-10-30 | End: 2024-10-30

## 2024-10-30 RX ORDER — HEPARIN SODIUM,PORCINE/PF 10 UNIT/ML
100 SYRINGE (ML) INTRAVENOUS ONCE
Refills: 0 | Status: COMPLETED | OUTPATIENT
Start: 2024-10-30 | End: 2024-10-30

## 2024-10-30 RX ORDER — SUCRALFATE 1 G/10ML
1 SUSPENSION ORAL
Refills: 0 | Status: DISCONTINUED | OUTPATIENT
Start: 2024-10-30 | End: 2024-10-30

## 2024-10-30 RX ADMIN — PREGABALIN 200 MILLIGRAM(S): 150 CAPSULE ORAL at 13:08

## 2024-10-30 RX ADMIN — METHADONE HYDROCHLORIDE 5 MILLIGRAM(S): 10 TABLET ORAL at 13:08

## 2024-10-30 RX ADMIN — Medication 8 MILLIGRAM(S): at 12:04

## 2024-10-30 RX ADMIN — METHYLPHENIDATE HYDROCHLORIDE 20 MILLIGRAM(S): 27 TABLET, EXTENDED RELEASE ORAL at 12:03

## 2024-10-30 RX ADMIN — OCTREOTIDE ACETATE 100 MICROGRAM(S): 1000 INJECTION INTRAVENOUS; SUBCUTANEOUS at 07:03

## 2024-10-30 RX ADMIN — SUCRALFATE 1 GRAM(S): 1 SUSPENSION ORAL at 12:04

## 2024-10-30 RX ADMIN — METHYLPHENIDATE HYDROCHLORIDE 20 MILLIGRAM(S): 27 TABLET, EXTENDED RELEASE ORAL at 07:03

## 2024-10-30 RX ADMIN — METHADONE HYDROCHLORIDE 5 MILLIGRAM(S): 10 TABLET ORAL at 08:01

## 2024-10-30 RX ADMIN — MEMANTINE HYDROCHLORIDE 10 MILLIGRAM(S): 21 CAPSULE, EXTENDED RELEASE ORAL at 07:03

## 2024-10-30 RX ADMIN — Medication 100 UNIT(S): at 13:58

## 2024-10-30 RX ADMIN — Medication 40 MILLIEQUIVALENT(S): at 12:03

## 2024-10-30 RX ADMIN — Medication 8 MILLIGRAM(S): at 00:03

## 2024-10-30 RX ADMIN — CHLORHEXIDINE GLUCONATE 1 APPLICATION(S): 40 SOLUTION TOPICAL at 12:03

## 2024-10-30 RX ADMIN — PANTOPRAZOLE SODIUM 40 MILLIGRAM(S): 40 TABLET, DELAYED RELEASE ORAL at 07:03

## 2024-10-30 RX ADMIN — Medication 8 MILLIGRAM(S): at 13:05

## 2024-10-30 NOTE — PROGRESS NOTE ADULT - ASSESSMENT
39 yr old female with metastatic breast cancer, ER/[place] neg, Her-2 pos, currently on chemotherapy, chronic pain, anxiety/depression, prior symptomatic anemia presents for evaluation of low Hb. patient admitted to medicine and seen by GI and is for scope in am. Patient given 2 units prbcs with improvement of anemia.     #Acute on chronic symptomatic anemia  - GI following  - S/p EGD/Colonoscopy today - gastritis, stool in colon  - GI recs appreciated  - C/w PPI. Start Carafate  - likely in setting of malignancy  - Heme/Onc following  - S/p 2 units PRBC  - Pt states her goal Hgb > 8  - Pt endorsing continued fatigue, will repeat CBC and if Hgb < 8 transfuse additional unit    # Metastatic breast cancer, chronic pain:  - Oncology following  - CXR shows SINTIA mass, pt aware. Old finding.  - Medications reviewed and reconciled.    # Anxiety/Depression:  - Continue Diazepam, Fluoxetine.     #Hypokalemia  - Repletion ordered  - Monitor    #DVT ppx: SCDs    Dispo: Potential discharge today vs tomorrow, will repeat CBC and possibly transfuse.     Discussed w/ patient, requesting additional transfusion. She states her Hgb >8 per her Oncologist. Pt endorsing fatigue, will check CBC and possibly transfuse if Hgb <8 for symptomatic anemia. Potential discharge afterwards vs tomorrow.      39 yr old female with metastatic breast cancer, ER/[place] neg, Her-2 pos, currently on chemotherapy, chronic pain, anxiety/depression, prior symptomatic anemia presents for evaluation of low Hb. patient admitted to medicine and seen by GI and is for scope in am. Patient given 2 units prbcs with improvement of anemia. S/p EGD/Colonoscopy today - gastritis, stool in colon  #Acute on chronic symptomatic anemia  - GI following  - S/p EGD/Colonoscopy today - gastritis, stool in colon. Biopsies.   - GI recs appreciated  - C/w PPI. Start Carafate  - likely in setting of malignancy  - Heme/Onc following  - S/p 2 units PRBC  - Pt states her goal Hgb > 8  - Pt endorsing continued fatigue, will repeat CBC and if Hgb < 8 transfuse additional unit    # Metastatic breast cancer, chronic pain:  - Oncology following  - CXR shows SINTIA mass, pt aware. Old finding.  - Medications reviewed and reconciled.    # Anxiety/Depression:  - Continue Diazepam, Fluoxetine.     #Hypokalemia  - Repletion ordered  - Monitor    #DVT ppx: SCDs    Dispo: Potential discharge today vs tomorrow, will repeat CBC and possibly transfuse.     Discussed w/ patient, requesting additional transfusion. She states her Hgb >8 per her Oncologist. Pt endorsing fatigue, will check CBC and possibly transfuse if Hgb <8 for symptomatic anemia. Potential discharge afterwards vs tomorrow.      39 yr old female with metastatic breast cancer, ER/[place] neg, Her-2 pos, currently on chemotherapy, chronic pain, anxiety/depression, prior symptomatic anemia presents for evaluation of low Hb. patient admitted to medicine and seen by GI and is for scope in am. Patient given 2 units prbcs with improvement of anemia. S/p EGD/Colonoscopy today - gastritis, stool in colon    #Acute on chronic symptomatic anemia  - GI following  - S/p EGD/Colonoscopy today - gastritis, stool in colon. Biopsy pending.   - GI recs appreciated  - C/w PPI. Start Carafate  - likely in setting of malignancy  - Heme/Onc following  - S/p 2 units PRBC  - Pt states her goal Hgb > 8  - Pt endorsing continued fatigue, will repeat CBC and if Hgb < 8 transfuse additional unit    # Metastatic breast cancer, chronic pain:  - Oncology following  - CXR shows SINTIA mass, pt aware. Old finding.  - Medications reviewed and reconciled.    # Anxiety/Depression:  - Continue Diazepam, Fluoxetine.     #Hypokalemia  - Repletion ordered  - Monitor    #DVT ppx: SCDs    Dispo: Potential discharge today vs tomorrow, will repeat CBC and possibly transfuse.     Discussed w/ patient, requesting additional transfusion. She states her Hgb >8 per her Oncologist. Pt endorsing fatigue, will check CBC and possibly transfuse if Hgb <8 for symptomatic anemia. Potential discharge afterwards vs tomorrow.

## 2024-10-30 NOTE — DISCHARGE NOTE PROVIDER - NSDCCPCAREPLAN_GEN_ALL_CORE_FT
PRINCIPAL DISCHARGE DIAGNOSIS  Diagnosis: Anemia  Assessment and Plan of Treatment: S/p 3 units PRBC. S/p EGD/Colonoscopy showing gastritis. Follow up with GI.

## 2024-10-30 NOTE — DISCHARGE NOTE NURSING/CASE MANAGEMENT/SOCIAL WORK - FINANCIAL ASSISTANCE
[facility] provides services at a reduced cost to those who are determined to be eligible through [facility]’s financial assistance program. Information regarding [facility]’s financial assistance program can be found by going to [email or web address] or by calling [phone].

## 2024-10-30 NOTE — DISCHARGE NOTE PROVIDER - NSDCMRMEDTOKEN_GEN_ALL_CORE_FT
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  pregabalin 200 mg oral capsule: 1 cap(s) orally 3 times a day  Protonix 40 mg oral delayed release tablet: 1 tab(s) orally 2 times a day  Ritalin 20 mg oral tablet: 1 tab(s) orally 2 times a day   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

## 2024-10-30 NOTE — PROGRESS NOTE ADULT - SUBJECTIVE AND OBJECTIVE BOX
[facility] Division of Hospital Medicine  Baltazar Dodson MD    Chief Complaint:  Patient is a 39y old  Female who presents with a chief complaint of low hemoglobin (29 Oct 2024 13:26)      SUBJECTIVE / OVERNIGHT EVENTS:  Patient seen and examined at bedside. No acute events reported overnight. No new complaints.    MEDICATIONS  (STANDING):  chlorhexidine 2% Cloths 1 Application(s) Topical daily  FLUoxetine 80 milliGRAM(s) Oral at bedtime  melatonin 5 milliGRAM(s) Oral at bedtime  memantine 10 milliGRAM(s) Oral two times a day  methadone    Tablet 5 milliGRAM(s) Oral <User Schedule>  methadone    Tablet 10 milliGRAM(s) Oral at bedtime  methylphenidate 20 milliGRAM(s) Oral two times a day  pantoprazole  Injectable 40 milliGRAM(s) IV Push every 12 hours  pregabalin 200 milliGRAM(s) Oral three times a day  sucralfate 1 Gram(s) Oral four times a day    MEDICATIONS  (PRN):  acetaminophen     Tablet .. 650 milliGRAM(s) Oral every 6 hours PRN Temp greater or equal to 38C (100.4F), Mild Pain (1 - 3)  aluminum hydroxide/magnesium hydroxide/simethicone Suspension 30 milliLiter(s) Oral every 4 hours PRN Dyspepsia  diazepam    Tablet 5 milliGRAM(s) Oral two times a day PRN anxiety/muscle spasm  hydrocodone/homatropine Syrup 5 milliLiter(s) Oral every 6 hours PRN Cough  HYDROmorphone   Tablet 8 milliGRAM(s) Oral three times a day PRN Severe Pain (7 - 10)  HYDROmorphone   Tablet 4 milliGRAM(s) Oral three times a day PRN for moderate pain  HYDROmorphone  Injectable 0.5 milliGRAM(s) IV Push once PRN breakthrough  ondansetron Injectable 4 milliGRAM(s) IV Push every 8 hours PRN Nausea and/or Vomiting        I&O's Summary      PHYSICAL EXAM:  Vital Signs Last 24 Hrs  T(C): 36.8 (30 Oct 2024 12:08), Max: 37.1 (30 Oct 2024 09:00)  T(F): 98.2 (30 Oct 2024 12:08), Max: 98.7 (30 Oct 2024 09:00)  HR: 101 (30 Oct 2024 12:08) (85 - 101)  BP: 113/75 (30 Oct 2024 12:08) (107/69 - 127/80)  BP(mean): 88 (29 Oct 2024 20:52) (88 - 95)  RR: 18 (30 Oct 2024 12:08) (16 - 18)  SpO2: 96% (30 Oct 2024 12:08) (94% - 99%)    Parameters below as of 30 Oct 2024 12:08  Patient On (Oxygen Delivery Method): room air            CONSTITUTIONAL: NAD  HEENT: NC/AT, PERRL, no JVD  RESPIRATORY: CTA bilaterally, normal effort  CARDIOVASCULAR: RRR, S1/S2+, no m/g/r  ABDOMEN: Nontender to palpation, normoactive bowel sounds, no rebound/guarding  MUSCULOSKELETAL: No edema, cyanosis or deformities.  PSYCH: Calm, affect appropriate.  NEUROLOGY: Awake, alert, no focal neurological deficits.   SKIN: No rashes; no palpable lesions  VASC: Distal pulses palpable    LABS:                        7.9    3.29  )-----------( 158      ( 30 Oct 2024 04:40 )             24.5     10-30    145  |  109[H]  |  4.5[L]  ----------------------------<  102[H]  3.3[L]   |  23.0  |  0.38[L]    Ca    6.9[L]      30 Oct 2024 04:40  Mg     1.8     10-30    TPro  4.9[L]  /  Alb  2.9[L]  /  TBili  0.9  /  DBili  x   /  AST  25  /  ALT  21  /  AlkPhos  194[H]  10-30    PT/INR - ( 30 Oct 2024 04:40 )   PT: 12.3 sec;   INR: 1.09 ratio         PTT - ( 28 Oct 2024 17:00 )  PTT:35.6 sec      Urinalysis Basic - ( 30 Oct 2024 04:40 )    Color: x / Appearance: x / SG: x / pH: x  Gluc: 102 mg/dL / Ketone: x  / Bili: x / Urobili: x   Blood: x / Protein: x / Nitrite: x   Leuk Esterase: x / RBC: x / WBC x   Sq Epi: x / Non Sq Epi: x / Bacteria: x        CAPILLARY BLOOD GLUCOSE            RADIOLOGY & ADDITIONAL TESTS:  Results Reviewed:   Imaging Personally Reviewed:  Electrocardiogram Personally Reviewed:                                           [facility] Division of Hospital Medicine  Baltazar Dodson MD    Chief Complaint:  Patient is a 39y old  Female who presents with a chief complaint of low hemoglobin (29 Oct 2024 13:26)      SUBJECTIVE / OVERNIGHT EVENTS:  Patient seen and examined at bedside. No acute events reported overnight. Endorsing fatigue.     MEDICATIONS  (STANDING):  chlorhexidine 2% Cloths 1 Application(s) Topical daily  FLUoxetine 80 milliGRAM(s) Oral at bedtime  melatonin 5 milliGRAM(s) Oral at bedtime  memantine 10 milliGRAM(s) Oral two times a day  methadone    Tablet 5 milliGRAM(s) Oral <User Schedule>  methadone    Tablet 10 milliGRAM(s) Oral at bedtime  methylphenidate 20 milliGRAM(s) Oral two times a day  pantoprazole  Injectable 40 milliGRAM(s) IV Push every 12 hours  pregabalin 200 milliGRAM(s) Oral three times a day  sucralfate 1 Gram(s) Oral four times a day    MEDICATIONS  (PRN):  acetaminophen     Tablet .. 650 milliGRAM(s) Oral every 6 hours PRN Temp greater or equal to 38C (100.4F), Mild Pain (1 - 3)  aluminum hydroxide/magnesium hydroxide/simethicone Suspension 30 milliLiter(s) Oral every 4 hours PRN Dyspepsia  diazepam    Tablet 5 milliGRAM(s) Oral two times a day PRN anxiety/muscle spasm  hydrocodone/homatropine Syrup 5 milliLiter(s) Oral every 6 hours PRN Cough  HYDROmorphone   Tablet 8 milliGRAM(s) Oral three times a day PRN Severe Pain (7 - 10)  HYDROmorphone   Tablet 4 milliGRAM(s) Oral three times a day PRN for moderate pain  HYDROmorphone  Injectable 0.5 milliGRAM(s) IV Push once PRN breakthrough  ondansetron Injectable 4 milliGRAM(s) IV Push every 8 hours PRN Nausea and/or Vomiting        I&O's Summary      PHYSICAL EXAM:  Vital Signs Last 24 Hrs  T(C): 36.8 (30 Oct 2024 12:08), Max: 37.1 (30 Oct 2024 09:00)  T(F): 98.2 (30 Oct 2024 12:08), Max: 98.7 (30 Oct 2024 09:00)  HR: 101 (30 Oct 2024 12:08) (85 - 101)  BP: 113/75 (30 Oct 2024 12:08) (107/69 - 127/80)  BP(mean): 88 (29 Oct 2024 20:52) (88 - 95)  RR: 18 (30 Oct 2024 12:08) (16 - 18)  SpO2: 96% (30 Oct 2024 12:08) (94% - 99%)    Parameters below as of 30 Oct 2024 12:08  Patient On (Oxygen Delivery Method): room air            CONSTITUTIONAL: NAD  HEENT: NC/AT, PERRL, no JVD  RESPIRATORY: CTA bilaterally, normal effort  CARDIOVASCULAR: RRR, S1/S2+, no m/g/r  ABDOMEN: Nontender to palpation, normoactive bowel sounds, no rebound/guarding  MUSCULOSKELETAL: No edema, cyanosis or deformities.  PSYCH: Calm, affect appropriate.  NEUROLOGY: Awake, alert, no focal neurological deficits.   SKIN: No rashes; no palpable lesions  VASC: Distal pulses palpable    LABS:                        7.9    3.29  )-----------( 158      ( 30 Oct 2024 04:40 )             24.5     10-30    145  |  109[H]  |  4.5[L]  ----------------------------<  102[H]  3.3[L]   |  23.0  |  0.38[L]    Ca    6.9[L]      30 Oct 2024 04:40  Mg     1.8     10-30    TPro  4.9[L]  /  Alb  2.9[L]  /  TBili  0.9  /  DBili  x   /  AST  25  /  ALT  21  /  AlkPhos  194[H]  10-30    PT/INR - ( 30 Oct 2024 04:40 )   PT: 12.3 sec;   INR: 1.09 ratio         PTT - ( 28 Oct 2024 17:00 )  PTT:35.6 sec      Urinalysis Basic - ( 30 Oct 2024 04:40 )    Color: x / Appearance: x / SG: x / pH: x  Gluc: 102 mg/dL / Ketone: x  / Bili: x / Urobili: x   Blood: x / Protein: x / Nitrite: x   Leuk Esterase: x / RBC: x / WBC x   Sq Epi: x / Non Sq Epi: x / Bacteria: x        CAPILLARY BLOOD GLUCOSE            RADIOLOGY & ADDITIONAL TESTS:  Results Reviewed:   Imaging Personally Reviewed:  Electrocardiogram Personally Reviewed:

## 2024-10-30 NOTE — DISCHARGE NOTE PROVIDER - CARE PROVIDER_API CALL
Hermes Rosado  Gastroenterology  [address], [address]  [place] [address]  Phone: [phone]  Fax: [phone]  Follow Up Time: 2 weeks    Angie Chapa  Medical Oncology  [address] [place] [id number]  Phone: [phone]  Fax: [phone]  Follow Up Time:

## 2024-10-30 NOTE — DISCHARGE NOTE NURSING/CASE MANAGEMENT/SOCIAL WORK - PATIENT PORTAL LINK FT
You can access the FollowMyHealth Patient Portal offered by [facility] by registering at the following website: [email or web address]. By joining [employer]’s FollowMyHealth portal, you will also be able to view your health information using other applications (apps) compatible with our system.

## 2024-10-30 NOTE — DISCHARGE NOTE PROVIDER - HOSPITAL COURSE
39 yr old female with metastatic breast cancer, ER/MI neg, Her-2 pos, currently on chemotherapy, chronic pain, anxiety/depression, prior symptomatic anemia presents for evaluation of low Hb. patient admitted to medicine and seen by GI and is for scope in am. Patient given 3 units prbcs with improvement of anemia. S/p EGD/Colonoscopy- gastritis, stool in colon. Anemia likely in setting of malignancy. GI recommending PPI and Carafate. Pt no longer requires inpatient hospitalization and is stable for discharge with outpatient follow up.

## 2024-10-30 NOTE — DISCHARGE NOTE PROVIDER - ATTENDING DISCHARGE PHYSICAL EXAMINATION:
Vital Signs Last 24 Hrs  T(F): 98.2 (30 Oct 2024 12:08), Max: 98.7 (30 Oct 2024 09:00)  HR: 101 (30 Oct 2024 12:08) (85 - 101)  BP: 113/75 (30 Oct 2024 12:08) (107/69 - 127/80)  RR: 18 (30 Oct 2024 12:08) (16 - 18)  SpO2: 96% (30 Oct 2024 12:08) (94% - 99%)    Physical Exam:  Constitutional: NAD  HEENT: NC/AT  Respiratory: CTA bilaterally, symmetrical chest rise  Cardiovascular: RRR, no m/g/r  Gastrointestinal: Soft, NT/ND, BS+  Vascular: 2+ peripheral pulses  Neurological: A/O x 3, no focal neurological deficits  Psych: Fair mood/affect  Musculoskeletal: No edema, cyanosis, deformities. ROM normal  Skin: No obvious rash, lesions. No jaundice.

## 2024-10-30 NOTE — DISCHARGE NOTE PROVIDER - REASON FOR ADMISSION
low hemoglobin Scribe Attestation (For Scribes USE Only)... Scribe Attestation (For Scribes USE Only).../Attending Attestation (For Attendings USE Only)...

## 2024-11-01 ENCOUNTER — EMERGENCY (EMERGENCY)
Facility: HOSPITAL | Age: 39
LOS: 1 days | Discharge: HOME | End: 2024-11-01
Attending: STUDENT IN AN ORGANIZED HEALTH CARE EDUCATION/TRAINING PROGRAM
Payer: MEDICARE

## 2024-11-01 VITALS
SYSTOLIC BLOOD PRESSURE: 98 MMHG | HEART RATE: 82 BPM | DIASTOLIC BLOOD PRESSURE: 61 MMHG | OXYGEN SATURATION: 98 % | HEIGHT: 62 IN | RESPIRATION RATE: 20 BRPM | WEIGHT: 145.06 LBS | TEMPERATURE: 98 F

## 2024-11-01 DIAGNOSIS — Z98.890 OTHER SPECIFIED POSTPROCEDURAL STATES: Chronic | ICD-10-CM

## 2024-11-01 DIAGNOSIS — Z90.89 ACQUIRED ABSENCE OF OTHER ORGANS: Chronic | ICD-10-CM

## 2024-11-01 LAB
ALBUMIN SERPL ELPH-MCNC: 2.9 G/DL — LOW (ref 3.3–5.2)
ALP SERPL-CCNC: 261 U/L — HIGH (ref 40–120)
ALT FLD-CCNC: 32 U/L — SIGNIFICANT CHANGE UP
ANION GAP SERPL CALC-SCNC: 10 MMOL/L — SIGNIFICANT CHANGE UP (ref 5–17)
AST SERPL-CCNC: 47 U/L — HIGH
BASOPHILS # BLD AUTO: 0 K/UL — SIGNIFICANT CHANGE UP (ref 0–0.2)
BASOPHILS NFR BLD AUTO: 0 % — SIGNIFICANT CHANGE UP (ref 0–2)
BILIRUB SERPL-MCNC: 0.5 MG/DL — SIGNIFICANT CHANGE UP (ref 0.4–2)
BLD GP AB SCN SERPL QL: SIGNIFICANT CHANGE UP
BUN SERPL-MCNC: 12.8 MG/DL — SIGNIFICANT CHANGE UP (ref 8–20)
CALCIUM SERPL-MCNC: 8.7 MG/DL — SIGNIFICANT CHANGE UP (ref 8.4–10.5)
CHLORIDE SERPL-SCNC: 104 MMOL/L — SIGNIFICANT CHANGE UP (ref 96–108)
CO2 SERPL-SCNC: 26 MMOL/L — SIGNIFICANT CHANGE UP (ref 22–29)
CREAT SERPL-MCNC: 0.55 MG/DL — SIGNIFICANT CHANGE UP (ref 0.5–1.3)
EGFR: 120 ML/MIN/1.73M2 — SIGNIFICANT CHANGE UP
EOSINOPHIL # BLD AUTO: 0.02 K/UL — SIGNIFICANT CHANGE UP (ref 0–0.5)
EOSINOPHIL NFR BLD AUTO: 0.5 % — SIGNIFICANT CHANGE UP (ref 0–6)
GLUCOSE SERPL-MCNC: 120 MG/DL — HIGH (ref 70–99)
HCT VFR BLD CALC: 15.1 % — CRITICAL LOW (ref 34.5–45)
HGB BLD-MCNC: 4.6 G/DL — CRITICAL LOW (ref 11.5–15.5)
IMM GRANULOCYTES NFR BLD AUTO: 2.7 % — HIGH (ref 0–0.9)
LYMPHOCYTES # BLD AUTO: 0.46 K/UL — LOW (ref 1–3.3)
LYMPHOCYTES # BLD AUTO: 12.3 % — LOW (ref 13–44)
MCHC RBC-ENTMCNC: 30.5 G/DL — LOW (ref 32–36)
MCHC RBC-ENTMCNC: 31.1 PG — SIGNIFICANT CHANGE UP (ref 27–34)
MCV RBC AUTO: 102 FL — HIGH (ref 80–100)
MONOCYTES # BLD AUTO: 0.38 K/UL — SIGNIFICANT CHANGE UP (ref 0–0.9)
MONOCYTES NFR BLD AUTO: 10.2 % — SIGNIFICANT CHANGE UP (ref 2–14)
NEUTROPHILS # BLD AUTO: 2.77 K/UL — SIGNIFICANT CHANGE UP (ref 1.8–7.4)
NEUTROPHILS NFR BLD AUTO: 74.3 % — SIGNIFICANT CHANGE UP (ref 43–77)
NRBC # BLD: 2 /100 WBCS — HIGH (ref 0–0)
PLATELET # BLD AUTO: 122 K/UL — LOW (ref 150–400)
POTASSIUM SERPL-MCNC: 3.6 MMOL/L — SIGNIFICANT CHANGE UP (ref 3.5–5.3)
POTASSIUM SERPL-SCNC: 3.6 MMOL/L — SIGNIFICANT CHANGE UP (ref 3.5–5.3)
PROT SERPL-MCNC: 4.9 G/DL — LOW (ref 6.6–8.7)
RBC # BLD: 1.48 M/UL — LOW (ref 3.8–5.2)
RBC # FLD: 20.2 % — HIGH (ref 10.3–14.5)
SODIUM SERPL-SCNC: 140 MMOL/L — SIGNIFICANT CHANGE UP (ref 135–145)
SURGICAL PATHOLOGY STUDY: SIGNIFICANT CHANGE UP
WBC # BLD: 3.73 K/UL — LOW (ref 3.8–10.5)
WBC # FLD AUTO: 3.73 K/UL — LOW (ref 3.8–10.5)

## 2024-11-01 PROCEDURE — 99223 1ST HOSP IP/OBS HIGH 75: CPT

## 2024-11-01 RX ORDER — METHADONE HYDROCHLORIDE 10 MG/1
5 TABLET ORAL
Refills: 0 | Status: COMPLETED | OUTPATIENT
Start: 2024-11-01 | End: 2024-11-08

## 2024-11-01 RX ORDER — OCTREOTIDE ACETATE 1000 UG/ML
100 INJECTION INTRAVENOUS; SUBCUTANEOUS
Refills: 0 | Status: ACTIVE | OUTPATIENT
Start: 2024-11-01 | End: 2025-09-30

## 2024-11-01 RX ORDER — PANTOPRAZOLE SODIUM 40 MG/1
40 TABLET, DELAYED RELEASE ORAL
Refills: 0 | Status: ACTIVE | OUTPATIENT
Start: 2024-11-01 | End: 2025-09-30

## 2024-11-01 RX ORDER — DIETHYLTOLUAMIDE 7 %
1 SPRAY, NON-AEROSOL (ML) TOPICAL DAILY
Refills: 0 | Status: ACTIVE | OUTPATIENT
Start: 2024-11-01 | End: 2025-09-30

## 2024-11-01 RX ORDER — FLUOXETINE HCL 10 MG
80 CAPSULE ORAL AT BEDTIME
Refills: 0 | Status: ACTIVE | OUTPATIENT
Start: 2024-11-01 | End: 2025-09-30

## 2024-11-01 RX ORDER — PREGABALIN 150 MG/1
200 CAPSULE ORAL THREE TIMES A DAY
Refills: 0 | Status: COMPLETED | OUTPATIENT
Start: 2024-11-01 | End: 2024-11-08

## 2024-11-01 RX ORDER — GUAIFENESIN 100 MG/5ML
100 LIQUID ORAL EVERY 6 HOURS
Refills: 0 | Status: ACTIVE | OUTPATIENT
Start: 2024-11-01 | End: 2025-09-30

## 2024-11-01 RX ORDER — DIAZEPAM 10 MG/1
5 FILM BUCCAL
Refills: 0 | Status: COMPLETED | OUTPATIENT
Start: 2024-11-01 | End: 2024-11-08

## 2024-11-01 RX ORDER — HYDROMORPHONE HCL/0.9% NACL/PF 6 MG/30 ML
2 PATIENT CONTROLLED ANALGESIA SYRINGE INTRAVENOUS
Refills: 0 | Status: DISCONTINUED | OUTPATIENT
Start: 2024-11-01 | End: 2024-11-02

## 2024-11-01 RX ORDER — METHYLPHENIDATE HYDROCHLORIDE 27 MG/1
20 TABLET, EXTENDED RELEASE ORAL
Refills: 0 | Status: COMPLETED | OUTPATIENT
Start: 2024-11-01 | End: 2024-11-08

## 2024-11-01 RX ORDER — MELATONIN 5 MG
5 TABLET ORAL AT BEDTIME
Refills: 0 | Status: ACTIVE | OUTPATIENT
Start: 2024-11-01 | End: 2025-09-30

## 2024-11-01 RX ORDER — HYDROMORPHONE HCL/0.9% NACL/PF 6 MG/30 ML
2 PATIENT CONTROLLED ANALGESIA SYRINGE INTRAVENOUS ONCE
Refills: 0 | Status: DISCONTINUED | OUTPATIENT
Start: 2024-11-01 | End: 2024-11-01

## 2024-11-01 RX ORDER — METHADONE HYDROCHLORIDE 10 MG/1
10 TABLET ORAL AT BEDTIME
Refills: 0 | Status: COMPLETED | OUTPATIENT
Start: 2024-11-01 | End: 2024-11-08

## 2024-11-01 RX ORDER — SUCRALFATE 1 G/10ML
1 SUSPENSION ORAL
Refills: 0 | Status: ACTIVE | OUTPATIENT
Start: 2024-11-01 | End: 2025-09-30

## 2024-11-01 RX ORDER — MEMANTINE HYDROCHLORIDE 21 MG/1
10 CAPSULE, EXTENDED RELEASE ORAL
Refills: 0 | Status: ACTIVE | OUTPATIENT
Start: 2024-11-01 | End: 2025-09-30

## 2024-11-01 RX ADMIN — Medication 80 MILLIGRAM(S): at 22:43

## 2024-11-01 RX ADMIN — OCTREOTIDE ACETATE 100 MICROGRAM(S): 1000 INJECTION INTRAVENOUS; SUBCUTANEOUS at 19:20

## 2024-11-01 RX ADMIN — DIAZEPAM 5 MILLIGRAM(S): 10 FILM BUCCAL at 17:40

## 2024-11-01 RX ADMIN — Medication 2 MILLIGRAM(S): at 17:43

## 2024-11-01 RX ADMIN — METHADONE HYDROCHLORIDE 10 MILLIGRAM(S): 10 TABLET ORAL at 22:43

## 2024-11-01 RX ADMIN — PANTOPRAZOLE SODIUM 40 MILLIGRAM(S): 40 TABLET, DELAYED RELEASE ORAL at 17:40

## 2024-11-01 RX ADMIN — MEMANTINE HYDROCHLORIDE 10 MILLIGRAM(S): 21 CAPSULE, EXTENDED RELEASE ORAL at 18:07

## 2024-11-01 RX ADMIN — Medication 2 MILLIGRAM(S): at 13:22

## 2024-11-01 RX ADMIN — PREGABALIN 200 MILLIGRAM(S): 150 CAPSULE ORAL at 22:43

## 2024-11-01 RX ADMIN — SUCRALFATE 1 GRAM(S): 1 SUSPENSION ORAL at 17:41

## 2024-11-01 RX ADMIN — Medication 2 MILLIGRAM(S): at 22:44

## 2024-11-01 NOTE — ED PROVIDER NOTE - PRINCIPAL DIAGNOSIS
PT STATED DR GAUTHIER WANTED HIM TO COME BACK TO GET LABS IN April, I SAW NO DOCUMENTATION AND WANTED TO CHECK IF THIS WAS CORRECT  
Yes appointment scheduled   
Anemia of chronic disease

## 2024-11-01 NOTE — ED PROVIDER NOTE - NS ED ROS FT
Const: Denies fever, chills  HEENT: Denies blurry vision, sore throat  Neck: Denies neck pain/stiffness  Resp: + Cough. Denies SOB  Cardiovascular: Denies CP, palpitations, LE edema  GI: Denies nausea, vomiting, abdominal pain, diarrhea, constipation, blood in stool  : Denies urinary frequency/urgency/dysuria, hematuria  MSK: Denies back pain  Neuro: Denies HA, dizziness, numbness, weakness  Skin: Denies rashes.

## 2024-11-01 NOTE — ED CDU PROVIDER INITIAL DAY NOTE - CLINICAL SUMMARY MEDICAL DECISION MAKING FREE TEXT BOX
38 y/o F with PMH metastatic breast Ca on Chemo, h/o pleural effusions and compression fracture, chronic cancer-related pain presents for hgb of 7.0 on outpatient blood work yesterday.    Acute on chronic symptomatic anemia:  Receiving PRBC, pt consented by ED team  Continue on pantoprazole and carafate  Seen by GI on previous admission, no obvious bleeding noted on EGD, they believe the anemia may be 2/2 to chemo, pt refusing further work up again of anemia (EGD, rectal exam, etc.)  Just wants transfusion   Continue Octreotide.     Metastatic breast cancer, chronic pain:  Oncology evaluation in am, follows [facility].  Medications reviewed and reconciled.    Anxiety/Depression:  Continue Diazepam, Fluoxetine.

## 2024-11-01 NOTE — ED ADULT NURSE NOTE - OBJECTIVE STATEMENT
Patient presents to ER for medical evaluation, reports Lab work performed yesterday showed Hg 6, patient reports HX of breast cancer, currently on Chemo, denies SOB, no dizziness, resp even/unlabored.

## 2024-11-01 NOTE — ED CDU PROVIDER INITIAL DAY NOTE - PROGRESS NOTE DETAILS
38 y/o F with PMH metastatic breast Ca on Chemo, h/o pleural effusions and compression fracture, chronic cancer-related pain presents for lower hemoglobin. Found to have hemoglobin of 4.6 in the ED, 4 units PRBC ordered. Of note, patient had recent admission to [facility], GI consulted, performing EGD. Pending further PRBC

## 2024-11-01 NOTE — ED PROVIDER NOTE - PROGRESS NOTE DETAILS
Lilian Capps, DO: Patient's hgb 4.6. Patient consented for blood. Will order 4 units. Patient not amendable to admission, just wants to be transfused 4 units. She has been extensively worked up for any non-oncologic cause of anemia without obvious etiology other than her metastatic cancer and chemo.

## 2024-11-01 NOTE — ED PROVIDER NOTE - CLINICAL SUMMARY MEDICAL DECISION MAKING FREE TEXT BOX
38 y/o F with PMH metastatic breast Ca presents for hgb of 7.0 on outpatient blood work yesterday. No other associated symptoms. Pain is well controlled at this time - will provide pain control with dilaudid as needed. Will transfuse as needed.

## 2024-11-01 NOTE — ED ADULT TRIAGE NOTE - CHIEF COMPLAINT QUOTE
Pt arrives  to Ed  sent by Dr. Bermudez for low hemoglobin reading 7.0- Hx of breast CA , currently on chemo

## 2024-11-01 NOTE — ED PROVIDER NOTE - OBJECTIVE STATEMENT
38 y/o F with PMH metastatic breast Ca on Chemo, h/o pleural effusions and compression fracture, chronic cancer-related pain presents for hgb of 7.0 on outpatient blood work yesterday. She was doing well for a while, but has been trending down. She frequently requires transfusion. She notes her chronic pain is well controlled today, denies fevers, chest pain, SOB, nausea or vomiting or abdominal pain. She has had a cough for a few weeks that is not new or worsening. It has already been evaluated with CXR during prior admission.

## 2024-11-01 NOTE — ED CDU PROVIDER INITIAL DAY NOTE - OBJECTIVE STATEMENT
40 y/o F with PMH metastatic breast Ca on Chemo, h/o pleural effusions and compression fracture, chronic cancer-related pain presents for hgb of 7.0 on outpatient blood work yesterday. She was doing well for a while, but has been trending down. She frequently requires transfusion. She notes her chronic pain is well controlled today, denies fevers, chest pain, SOB, nausea or vomiting or abdominal pain. She has had a cough for a few weeks that is not new or worsening. It has already been evaluated with CXR during prior admission.

## 2024-11-01 NOTE — ED ADULT NURSE NOTE - BREATH SOUNDS, MLM
Clear Pt is a 89 yo WM  who presents to the ED with   PMHx of Atrial fibrillation on coumadin, h/o CHF, dementia, depression, DM, HLD, HTN, prostate cancer brought to ED from [facility] for evaluation of weakness and lethargy .  Information is obtained via chart and EMS.  Per [facility] pt appeared very weak today and did not eat breakfast.  They also noted that he had a temperature of 100.1 around 9 am.  Pt received a flu vaccine 11/4/18.  Currently pt alert to person only falling asleep at bedside and unable to provide history .Found to have RSV infection and diagnosed with COPD exacerbation and acute on chronic bronchitis exacerbation ,puljill biggs requested ,Dr Quintana called [facility] for antibacterial managmnet ,nebulised bronchodilators and pulmonology workup,O2 supply,serial labs and chest xrays,obtain ECHO to evaluate LVEF and rule out chf component Palliative care consult requested ,to discuss advance directives and complete MOLST

## 2024-11-02 VITALS
SYSTOLIC BLOOD PRESSURE: 121 MMHG | DIASTOLIC BLOOD PRESSURE: 85 MMHG | HEART RATE: 72 BPM | TEMPERATURE: 98 F | RESPIRATION RATE: 18 BRPM | OXYGEN SATURATION: 99 %

## 2024-11-02 LAB
HCT VFR BLD CALC: 37.1 % — SIGNIFICANT CHANGE UP (ref 34.5–45)
HGB BLD-MCNC: 12.5 G/DL — SIGNIFICANT CHANGE UP (ref 11.5–15.5)
MCHC RBC-ENTMCNC: 30.4 PG — SIGNIFICANT CHANGE UP (ref 27–34)
MCHC RBC-ENTMCNC: 33.7 G/DL — SIGNIFICANT CHANGE UP (ref 32–36)
MCV RBC AUTO: 90.3 FL — SIGNIFICANT CHANGE UP (ref 80–100)
NRBC # BLD: 3 /100 WBCS — HIGH (ref 0–0)
PLATELET # BLD AUTO: 162 K/UL — SIGNIFICANT CHANGE UP (ref 150–400)
RBC # BLD: 4.11 M/UL — SIGNIFICANT CHANGE UP (ref 3.8–5.2)
RBC # FLD: 19.6 % — HIGH (ref 10.3–14.5)
WBC # BLD: 5.64 K/UL — SIGNIFICANT CHANGE UP (ref 3.8–10.5)
WBC # FLD AUTO: 5.64 K/UL — SIGNIFICANT CHANGE UP (ref 3.8–10.5)

## 2024-11-02 PROCEDURE — 86900 BLOOD TYPING SEROLOGIC ABO: CPT

## 2024-11-02 PROCEDURE — 85027 COMPLETE CBC AUTOMATED: CPT

## 2024-11-02 PROCEDURE — G0378: CPT

## 2024-11-02 PROCEDURE — 86901 BLOOD TYPING SEROLOGIC RH(D): CPT

## 2024-11-02 PROCEDURE — 86850 RBC ANTIBODY SCREEN: CPT

## 2024-11-02 PROCEDURE — 85025 COMPLETE CBC W/AUTO DIFF WBC: CPT

## 2024-11-02 PROCEDURE — 80053 COMPREHEN METABOLIC PANEL: CPT

## 2024-11-02 PROCEDURE — 96376 TX/PRO/DX INJ SAME DRUG ADON: CPT

## 2024-11-02 PROCEDURE — 36415 COLL VENOUS BLD VENIPUNCTURE: CPT

## 2024-11-02 PROCEDURE — 86922 COMPATIBILITY TEST ANTIGLOB: CPT

## 2024-11-02 PROCEDURE — P9040: CPT

## 2024-11-02 PROCEDURE — 99239 HOSP IP/OBS DSCHRG MGMT >30: CPT

## 2024-11-02 PROCEDURE — 36430 TRANSFUSION BLD/BLD COMPNT: CPT

## 2024-11-02 RX ORDER — HEPARIN SODIUM,PORCINE/PF 10 UNIT/ML
300 SYRINGE (ML) INTRAVENOUS ONCE
Refills: 0 | Status: COMPLETED | OUTPATIENT
Start: 2024-11-02 | End: 2024-11-02

## 2024-11-02 RX ADMIN — METHYLPHENIDATE HYDROCHLORIDE 20 MILLIGRAM(S): 27 TABLET, EXTENDED RELEASE ORAL at 05:55

## 2024-11-02 RX ADMIN — GUAIFENESIN 100 MILLIGRAM(S): 100 LIQUID ORAL at 00:33

## 2024-11-02 RX ADMIN — Medication 2 MILLIGRAM(S): at 03:29

## 2024-11-02 RX ADMIN — PANTOPRAZOLE SODIUM 40 MILLIGRAM(S): 40 TABLET, DELAYED RELEASE ORAL at 05:56

## 2024-11-02 RX ADMIN — GUAIFENESIN 100 MILLIGRAM(S): 100 LIQUID ORAL at 05:55

## 2024-11-02 RX ADMIN — SUCRALFATE 1 GRAM(S): 1 SUSPENSION ORAL at 00:33

## 2024-11-02 RX ADMIN — Medication 2 MILLIGRAM(S): at 07:51

## 2024-11-02 RX ADMIN — DIAZEPAM 5 MILLIGRAM(S): 10 FILM BUCCAL at 05:55

## 2024-11-02 RX ADMIN — MEMANTINE HYDROCHLORIDE 10 MILLIGRAM(S): 21 CAPSULE, EXTENDED RELEASE ORAL at 05:56

## 2024-11-02 RX ADMIN — OCTREOTIDE ACETATE 100 MICROGRAM(S): 1000 INJECTION INTRAVENOUS; SUBCUTANEOUS at 05:56

## 2024-11-02 RX ADMIN — PREGABALIN 200 MILLIGRAM(S): 150 CAPSULE ORAL at 05:56

## 2024-11-02 RX ADMIN — Medication 300 UNIT(S): at 10:17

## 2024-11-02 RX ADMIN — SUCRALFATE 1 GRAM(S): 1 SUSPENSION ORAL at 05:56

## 2024-11-02 RX ADMIN — Medication 2 MILLIGRAM(S): at 08:46

## 2024-11-02 RX ADMIN — METHADONE HYDROCHLORIDE 5 MILLIGRAM(S): 10 TABLET ORAL at 05:56

## 2024-11-02 NOTE — ED CDU PROVIDER DISPOSITION NOTE - CLINICAL COURSE
Patient with h/o breast CA, found to have low H&H, transfused 4 U PRBC, feeling better, stable for outpatient f/u with hematologist.  Given copies of all results, understands and agrees to proceed. Patient with h/o breast CA, found to have low H&H, transfused 4 U PRBC, feeling better, stable for outpatient f/u with hematologist, MD Fang 11/4.  Given copies of all results, understands and agrees to proceed.

## 2024-11-02 NOTE — ED ADULT NURSE REASSESSMENT NOTE - NS ED NURSE REASSESS COMMENT FT1
Assumed care of pt at 19:15 as stated in report from RN DARRIAN. Charting as noted. Patient A&O x4, pt denies any pain/discomfort, denies CP/SOB. Updated on the plan of care. Call bell within reach, bed locked in lowest position. IV site flushed w/ NS. No redness, swelling or pain noted to site. No signs of acute distress noted, safety maintained. Pt remains on CM in NSR. Pt on 2nd PRBC. Pt made aware of plan of care and verbalized understanding.
Patient resting comfortably in bed. No acute distress noted. Patient has no complaints at this time. Patient has finished 4th unit of PRBC's. NSR lead II. No additional orders at this time.
Pt report given to OBS RN Oni. Pt is resting in stretcher comfortably at this time, no apparent distress noted at this time. Pt safety maintained. Pt denies any complaints at this time. Respirations even & unlabored. Pt made aware of plan of care and verbalized understanding.
Assumed care from RN MA. Patient is alert and oriented x4. Resting comfortably in bed. Patient was hospitalized for anemia. Patient has stage 4 breast cancer and hemoglobin was 4.6 at oncologists office yesterday. Patient has chronic anemia. Patient is currently receiving 4th unit of PRBC's through chemo port. VSS. No acute distress noted.

## 2024-11-02 NOTE — ED CDU PROVIDER DISPOSITION NOTE - NSFOLLOWUPINSTRUCTIONS_ED_ALL_ED_FT
Please follow up with your blood doctor  Return if symptoms worsen or persist     Anemia    Anemia is a condition in which the concentration of red blood cells or hemoglobin in the blood is below normal. Hemoglobin is a substance in red blood cells that carries oxygen to the tissues of the body. Anemia results in not enough oxygen reaching these tissues which can cause symptoms such as weakness, dizziness/lightheadedness, shortness of breath, chest pain, paleness, or nausea. The cause of your anemia may or may not be determined immediately. If your hemoglobin was dangerously low, you may have received a blood transfusion. Usually reactions to transfusions occur immediately but monitor yourself for any fevers, rash, or shortness of breath.    SEEK IMMEDIATE MEDICAL CARE IF YOU HAVE ANY OF THE FOLLOWING SYMPTOMS: extreme weakness/chest pain/shortness of breath, black or bloody stools, vomiting blood, fainting, fever, or any signs of dehydration.

## 2024-11-02 NOTE — ED CDU PROVIDER DISPOSITION NOTE - PATIENT PORTAL LINK FT
You can access the FollowMyHealth Patient Portal offered by [facility] by registering at the following website: [email or web address]. By joining [employer]’s FollowMyHealth portal, you will also be able to view your health information using other applications (apps) compatible with our system.

## 2024-11-02 NOTE — ED CDU PROVIDER SUBSEQUENT DAY NOTE - CLINICAL SUMMARY MEDICAL DECISION MAKING FREE TEXT BOX
40 y/o F with PMH metastatic breast Ca on Chemo, h/o pleural effusions and compression fracture, chronic cancer-related pain presents for low hgb. In observation for blood transfusion

## 2024-11-02 NOTE — ED CDU PROVIDER SUBSEQUENT DAY NOTE - ATTENDING APP SHARED VISIT CONTRIBUTION OF CARE
I, Bhaskar Bui MD, performed the initial face to face bedside interview with this patient regarding history of present illness, review of symptoms and relevant past medical, social and family history.  I completed an independent physical examination.  I was the initial provider who evaluated this patient. I have signed out the follow up of any pending tests (i.e. labs, radiological studies) to the ACP.  I have communicated the patient’s plan of care and disposition with the ACP.

## 2024-11-02 NOTE — ED CDU PROVIDER SUBSEQUENT DAY NOTE - PHYSICAL EXAMINATION
Gen: No acute distress, non toxic  HEENT: NCAT, Mucous membranes moist  Eyes: pink conjunctivae, EOMI, PERRL  CV: RRR, nl s1/s2.  Resp: CTAB, normal rate and effort  GI: Abdomen soft, NT, ND. No rebound, no guarding  Neuro: A&O x 3, sensorimotor intact without deficits   MSK: No spine or joint tenderness to palpation, Full ROM ext x 4  Skin: No rashes. intact and perfused

## 2024-11-02 NOTE — ED CDU PROVIDER DISPOSITION NOTE - ATTENDING CONTRIBUTION TO CARE
transfusion completed. no other complaints. patient feels comfortable with discharge and medical plan; PMD or clinic follow up recommended for reassessment. Patient is aware of signs/symptoms to return to the emergency department.

## 2024-11-04 ENCOUNTER — NON-APPOINTMENT (OUTPATIENT)
Age: 39
End: 2024-11-04

## 2024-11-11 NOTE — ED PROVIDER NOTE - CARE PLAN
Is This A New Presentation, Or A Follow-Up?: Skin Lesions How Severe Is Your Skin Lesion?: mild Have Your Skin Lesions Been Treated?: not been treated Principal Discharge DX:	Facial droop   1

## 2024-11-15 ENCOUNTER — EMERGENCY (EMERGENCY)
Facility: HOSPITAL | Age: 39
LOS: 1 days | Discharge: HOME | End: 2024-11-15
Attending: EMERGENCY MEDICINE
Payer: MEDICARE

## 2024-11-15 VITALS
DIASTOLIC BLOOD PRESSURE: 66 MMHG | SYSTOLIC BLOOD PRESSURE: 107 MMHG | HEART RATE: 111 BPM | OXYGEN SATURATION: 95 % | TEMPERATURE: 98 F | HEIGHT: 62 IN | WEIGHT: 145.06 LBS | RESPIRATION RATE: 20 BRPM

## 2024-11-15 DIAGNOSIS — Z98.890 OTHER SPECIFIED POSTPROCEDURAL STATES: Chronic | ICD-10-CM

## 2024-11-15 DIAGNOSIS — Z90.89 ACQUIRED ABSENCE OF OTHER ORGANS: Chronic | ICD-10-CM

## 2024-11-15 LAB
ANION GAP SERPL CALC-SCNC: 9 MMOL/L — SIGNIFICANT CHANGE UP (ref 5–17)
BASOPHILS # BLD AUTO: 0.01 K/UL — SIGNIFICANT CHANGE UP (ref 0–0.2)
BASOPHILS NFR BLD AUTO: 0.1 % — SIGNIFICANT CHANGE UP (ref 0–2)
BUN SERPL-MCNC: 11.5 MG/DL — SIGNIFICANT CHANGE UP (ref 8–20)
CALCIUM SERPL-MCNC: 7.9 MG/DL — LOW (ref 8.4–10.5)
CHLORIDE SERPL-SCNC: 103 MMOL/L — SIGNIFICANT CHANGE UP (ref 96–108)
CO2 SERPL-SCNC: 26 MMOL/L — SIGNIFICANT CHANGE UP (ref 22–29)
CREAT SERPL-MCNC: 0.36 MG/DL — LOW (ref 0.5–1.3)
EGFR: 133 ML/MIN/1.73M2 — SIGNIFICANT CHANGE UP
EOSINOPHIL # BLD AUTO: 0.03 K/UL — SIGNIFICANT CHANGE UP (ref 0–0.5)
EOSINOPHIL NFR BLD AUTO: 0.4 % — SIGNIFICANT CHANGE UP (ref 0–6)
GLUCOSE SERPL-MCNC: 102 MG/DL — HIGH (ref 70–99)
HCT VFR BLD CALC: 21.2 % — LOW (ref 34.5–45)
HGB BLD-MCNC: 6.3 G/DL — CRITICAL LOW (ref 11.5–15.5)
IMM GRANULOCYTES NFR BLD AUTO: 1.5 % — HIGH (ref 0–0.9)
LYMPHOCYTES # BLD AUTO: 0.64 K/UL — LOW (ref 1–3.3)
LYMPHOCYTES # BLD AUTO: 8.9 % — LOW (ref 13–44)
MCHC RBC-ENTMCNC: 29.7 G/DL — LOW (ref 32–36)
MCHC RBC-ENTMCNC: 30 PG — SIGNIFICANT CHANGE UP (ref 27–34)
MCV RBC AUTO: 101 FL — HIGH (ref 80–100)
MONOCYTES # BLD AUTO: 0.4 K/UL — SIGNIFICANT CHANGE UP (ref 0–0.9)
MONOCYTES NFR BLD AUTO: 5.6 % — SIGNIFICANT CHANGE UP (ref 2–14)
NEUTROPHILS # BLD AUTO: 5.98 K/UL — SIGNIFICANT CHANGE UP (ref 1.8–7.4)
NEUTROPHILS NFR BLD AUTO: 83.5 % — HIGH (ref 43–77)
PLATELET # BLD AUTO: 153 K/UL — SIGNIFICANT CHANGE UP (ref 150–400)
POTASSIUM SERPL-MCNC: 3.7 MMOL/L — SIGNIFICANT CHANGE UP (ref 3.5–5.3)
POTASSIUM SERPL-SCNC: 3.7 MMOL/L — SIGNIFICANT CHANGE UP (ref 3.5–5.3)
RBC # BLD: 2.1 M/UL — LOW (ref 3.8–5.2)
RBC # FLD: 20.3 % — HIGH (ref 10.3–14.5)
SODIUM SERPL-SCNC: 138 MMOL/L — SIGNIFICANT CHANGE UP (ref 135–145)
WBC # BLD: 7.17 K/UL — SIGNIFICANT CHANGE UP (ref 3.8–10.5)
WBC # FLD AUTO: 7.17 K/UL — SIGNIFICANT CHANGE UP (ref 3.8–10.5)

## 2024-11-15 PROCEDURE — 99223 1ST HOSP IP/OBS HIGH 75: CPT

## 2024-11-15 RX ORDER — HYDROMORPHONE HCL/0.9% NACL/PF 6 MG/30 ML
1 PATIENT CONTROLLED ANALGESIA SYRINGE INTRAVENOUS EVERY 4 HOURS
Refills: 0 | Status: COMPLETED | OUTPATIENT
Start: 2024-11-15 | End: 2024-11-22

## 2024-11-15 RX ORDER — HYDROMORPHONE HCL/0.9% NACL/PF 6 MG/30 ML
1 PATIENT CONTROLLED ANALGESIA SYRINGE INTRAVENOUS ONCE
Refills: 0 | Status: DISCONTINUED | OUTPATIENT
Start: 2024-11-15 | End: 2024-11-15

## 2024-11-15 RX ORDER — MEMANTINE HYDROCHLORIDE 21 MG/1
10 CAPSULE, EXTENDED RELEASE ORAL
Refills: 0 | Status: ACTIVE | OUTPATIENT
Start: 2024-11-15 | End: 2025-10-14

## 2024-11-15 RX ORDER — METHADONE HYDROCHLORIDE 10 MG/1
5 TABLET ORAL
Refills: 0 | Status: COMPLETED | OUTPATIENT
Start: 2024-11-15 | End: 2024-11-22

## 2024-11-15 RX ORDER — ACETAMINOPHEN 500 MG
650 TABLET ORAL EVERY 6 HOURS
Refills: 0 | Status: ACTIVE | OUTPATIENT
Start: 2024-11-15 | End: 2025-10-14

## 2024-11-15 RX ORDER — DIAZEPAM 10 MG/1
5 FILM BUCCAL
Refills: 0 | Status: COMPLETED | OUTPATIENT
Start: 2024-11-15 | End: 2024-11-22

## 2024-11-15 RX ORDER — PREGABALIN 150 MG/1
200 CAPSULE ORAL THREE TIMES A DAY
Refills: 0 | Status: COMPLETED | OUTPATIENT
Start: 2024-11-15 | End: 2024-11-22

## 2024-11-15 RX ORDER — SUCRALFATE 1 G/10ML
1 SUSPENSION ORAL
Refills: 0 | Status: ACTIVE | OUTPATIENT
Start: 2024-11-15 | End: 2025-10-14

## 2024-11-15 RX ORDER — METHADONE HYDROCHLORIDE 10 MG/1
10 TABLET ORAL AT BEDTIME
Refills: 0 | Status: COMPLETED | OUTPATIENT
Start: 2024-11-15 | End: 2024-11-22

## 2024-11-15 RX ORDER — METHYLPHENIDATE HYDROCHLORIDE 27 MG/1
20 TABLET, EXTENDED RELEASE ORAL
Refills: 0 | Status: COMPLETED | OUTPATIENT
Start: 2024-11-15 | End: 2024-11-22

## 2024-11-15 RX ORDER — FLUOXETINE HCL 10 MG
80 CAPSULE ORAL AT BEDTIME
Refills: 0 | Status: ACTIVE | OUTPATIENT
Start: 2024-11-15 | End: 2025-10-14

## 2024-11-15 RX ORDER — PANTOPRAZOLE SODIUM 40 MG/1
40 TABLET, DELAYED RELEASE ORAL
Refills: 0 | Status: ACTIVE | OUTPATIENT
Start: 2024-11-15 | End: 2025-10-14

## 2024-11-15 RX ADMIN — Medication 1 MILLIGRAM(S): at 21:32

## 2024-11-15 RX ADMIN — METHADONE HYDROCHLORIDE 10 MILLIGRAM(S): 10 TABLET ORAL at 21:34

## 2024-11-15 RX ADMIN — Medication 1 MILLIGRAM(S): at 16:29

## 2024-11-15 RX ADMIN — Medication 1 MILLIGRAM(S): at 15:29

## 2024-11-15 RX ADMIN — Medication 80 MILLIGRAM(S): at 21:34

## 2024-11-15 RX ADMIN — PREGABALIN 200 MILLIGRAM(S): 150 CAPSULE ORAL at 21:34

## 2024-11-15 RX ADMIN — Medication 1 MILLIGRAM(S): at 18:31

## 2024-11-15 NOTE — ED CDU PROVIDER INITIAL DAY NOTE - NS ED ROS FT
Gen: denies fever, chills, fatigue, weight loss  Skin: denies rashes, laceration, bruising  HEENT: denies visual changes, ear pain, nasal congestion, throat pain  Respiratory: denies LOUIE, SOB, cough, wheezing  Cardiovascular: denies chest pain, palpitations, diaphoresis, LE edema  GI: denies abdominal pain, n/v/d  MSK: denies joint swelling/pain, back pain, neck pain  Neuro: denies headache, dizziness, weakness, numbness

## 2024-11-15 NOTE — ED ADULT NURSE NOTE - OBJECTIVE STATEMENT
A&O x 4 hx of breast cancer with metastasis to bones sent to ED for low hgb. Pt reports pain; medicated as prescribed. Bed locked and in lowest position. Call bell within reach. Able to make needs nown.

## 2024-11-15 NOTE — ED PROVIDER NOTE - OBJECTIVE STATEMENT
Pt is a 40 yo F here for anemia. Pt with stage IV breast CA. pt with hx of recurrent anemia.  Pt states that she was told her hgb was low again and was told to come to the ER for xfusion. co weakness/lightheaded.

## 2024-11-15 NOTE — ED CDU PROVIDER INITIAL DAY NOTE - OBJECTIVE STATEMENT
Pt is a 38 yo F here for anemia. Pt with stage IV breast CA. Pt was sent in from outpatient for abnormal labs. Pt has hx of anemia and coming to ED for infusions. Pt c/o left lower abdominal pain but states that this pain always happens when she is anemic and needs a transfusion. Of note, pt currently taking Augmentin for URI. Pt denies other complaints at this time.

## 2024-11-15 NOTE — ED CDU PROVIDER INITIAL DAY NOTE - ATTENDING APP SHARED VISIT CONTRIBUTION OF CARE
Regina VERDUZCOUT-28-zebt-old female with history of metastatic breast carcinoma and recurrent anemia placed in observation unit for blood transfusion.  Patient has baseline MICHELL and is on pain control    Patient is alert chronically sick appearing female with baseline slurred speech no acute complaints    Plan to give 2 units of PRBC patient has outpatient GI workup and follows up with TYLER

## 2024-11-15 NOTE — ED CDU PROVIDER INITIAL DAY NOTE - CLINICAL SUMMARY MEDICAL DECISION MAKING FREE TEXT BOX
Pt is a 40 yo F here for anemia. Pt with stage IV breast CA. Pt was sent in from outpatient for abnormal labs. Pt has hx of anemia and coming to ED for infusions. Pt c/o left lower abdominal pain but states that this pain always happens when she is anemic and needs a transfusion. Pt denies other complaints at this time. Pt placed in obs for 2 units PRBC. Pain being controlled. Will continue to monitor.

## 2024-11-15 NOTE — ED CDU PROVIDER INITIAL DAY NOTE - PHYSICAL EXAMINATION
Gen: No acute distress, non toxic  Head: NCAT  Eyes: pink conjunctivae, EOMI, PERRL  CV: RRR, nl s1/s2.  Resp: mild ronchi in L lung, normal rate and effort, no respiratory distress, no wheezing  GI: Abdomen soft, NT, ND. No rebound, no guarding  Neuro: A&O x 3  MSK: No spine or joint tenderness to palpation, Full ROM ext x 4  Skin: No rashes. intact and perfused.

## 2024-11-16 ENCOUNTER — TRANSCRIPTION ENCOUNTER (OUTPATIENT)
Age: 39
End: 2024-11-16

## 2024-11-16 VITALS
SYSTOLIC BLOOD PRESSURE: 103 MMHG | DIASTOLIC BLOOD PRESSURE: 70 MMHG | TEMPERATURE: 99 F | HEART RATE: 89 BPM | RESPIRATION RATE: 16 BRPM | OXYGEN SATURATION: 93 %

## 2024-11-16 PROCEDURE — 96376 TX/PRO/DX INJ SAME DRUG ADON: CPT

## 2024-11-16 PROCEDURE — 99239 HOSP IP/OBS DSCHRG MGMT >30: CPT

## 2024-11-16 PROCEDURE — 99284 EMERGENCY DEPT VISIT MOD MDM: CPT | Mod: 25

## 2024-11-16 PROCEDURE — 96374 THER/PROPH/DIAG INJ IV PUSH: CPT

## 2024-11-16 PROCEDURE — 36415 COLL VENOUS BLD VENIPUNCTURE: CPT

## 2024-11-16 PROCEDURE — 86900 BLOOD TYPING SEROLOGIC ABO: CPT

## 2024-11-16 PROCEDURE — 96375 TX/PRO/DX INJ NEW DRUG ADDON: CPT

## 2024-11-16 PROCEDURE — 36430 TRANSFUSION BLD/BLD COMPNT: CPT

## 2024-11-16 PROCEDURE — 86922 COMPATIBILITY TEST ANTIGLOB: CPT

## 2024-11-16 PROCEDURE — 86850 RBC ANTIBODY SCREEN: CPT

## 2024-11-16 PROCEDURE — 86901 BLOOD TYPING SEROLOGIC RH(D): CPT

## 2024-11-16 PROCEDURE — 85025 COMPLETE CBC W/AUTO DIFF WBC: CPT

## 2024-11-16 PROCEDURE — G0378: CPT

## 2024-11-16 PROCEDURE — P9040: CPT

## 2024-11-16 PROCEDURE — 80048 BASIC METABOLIC PNL TOTAL CA: CPT

## 2024-11-16 RX ORDER — HEPARIN SODIUM,PORCINE/PF 10 UNIT/ML
300 SYRINGE (ML) INTRAVENOUS ONCE
Refills: 0 | Status: COMPLETED | OUTPATIENT
Start: 2024-11-16 | End: 2024-11-16

## 2024-11-16 RX ADMIN — SUCRALFATE 1 GRAM(S): 1 SUSPENSION ORAL at 01:14

## 2024-11-16 RX ADMIN — Medication 1 MILLIGRAM(S): at 01:56

## 2024-11-16 RX ADMIN — Medication 300 UNIT(S): at 03:25

## 2024-11-16 NOTE — DISCHARGE NOTE NURSING/CASE MANAGEMENT/SOCIAL WORK - PATIENT PORTAL LINK FT
You can access the FollowMyHealth Patient Portal offered by [facility] by registering at the following website: [email or web address]. By joining [employer]’s FollowMyHealth portal, you will also be able to view your health information using other applications (apps) compatible with our system.

## 2024-11-16 NOTE — ED ADULT NURSE REASSESSMENT NOTE - NS ED NURSE REASSESS COMMENT FT1
1 unit PRBCS given. Consent in chart. Risks and benefits explained to patient. Patient verbalized understanding of risks and benefits. Patient aware of possible side effects. Vital signs stable. Second RN at bedside for confirmation.
Pt currently receiving second unit of PRBCs. No s/s of transfusion reaction. Vitals remain stable. Patient A&Ox4. Patient denies chest pain and SOB. Call bell within reach, safety maintained.  Pt resting comfortably, rounds complete.
Pt deemed fit for safe discharge. Pt s/p 2 units PRBCs. Vitals signs stable. Pt denies any complaints at this time. Pt had no peripheral IV access, R chest wall port de-accessed. Discharge instructions reviewed with pt, pt understands and agrees. Pt picked up by father with all belongings.
Pt resting comfortably in bed, denies any adverse transfusion reactions at this time. No signs of transfusion reaction noted. Call bell within reach.
Pt received this PM at 1900 from day RN with no acute distress. Pt receiving PRBCs at this time, pt denies any s/s of transfusion reaction. Patient A&Ox4. Patient denies chest pain and SOB. Vitals remain stable. Call bell within reach, safety maintained.  Pt resting comfortably, rounds complete.

## 2024-11-16 NOTE — ED CDU PROVIDER DISPOSITION NOTE - PATIENT PORTAL LINK FT
You can access the FollowMyHealth Patient Portal offered by [facility] by registering at the following website: [email or web address]. By joining [facility]’s FollowMyHealth portal, you will also be able to view your health information using other applications (apps) compatible with our system.

## 2024-11-16 NOTE — ED CDU PROVIDER DISPOSITION NOTE - CLINICAL COURSE
40 yo F here for anemia. Pt with stage IV breast CA. Pt was sent in from outpatient for abnormal labs. Pt has hx of anemia and coming to ED for infusions. Pt c/o left lower abdominal pain but states that this pain always happens when she is anemic and needs a transfusion. Of note, pt currently taking Augmentin for URI. Pt denies other complaints at this time. HGB 6.3 without symptoms.   received 2 units PRBC without complication  Pt reassessed, pt feeling better at this time, vss, pt able to walk, talk and vocalized plan of action. Discussed in depth and explained to pt in depth the next steps that need to be taken including proper follow up with PCP or specialists. All incidental findings were discussed with pt as well. Pt verbalized their concerns and all questions were answered. Pt understands dispo and wants discharge. Given good instructions when to return to ED, strict return precautions and importance of f/u.

## 2024-11-16 NOTE — DISCHARGE NOTE NURSING/CASE MANAGEMENT/SOCIAL WORK - FINANCIAL ASSISTANCE
[facility] provides services at a reduced cost to those who are determined to be eligible through [facility]’s financial assistance program. Information regarding [facility]’s financial assistance program can be found by going to [email or web address] or by calling [phone].

## 2024-11-16 NOTE — ED CDU PROVIDER DISPOSITION NOTE - ATTENDING CONTRIBUTION TO CARE
Regina VERDUZCO- Patient tolerated 2 units of blood transfusion and was discharged 23-Mar-2019 00:17

## 2024-11-22 ENCOUNTER — INPATIENT (INPATIENT)
Facility: HOSPITAL | Age: 39
LOS: 3 days | Discharge: HOME | DRG: 599 | End: 2024-11-26
Attending: STUDENT IN AN ORGANIZED HEALTH CARE EDUCATION/TRAINING PROGRAM | Admitting: STUDENT IN AN ORGANIZED HEALTH CARE EDUCATION/TRAINING PROGRAM
Payer: MEDICARE

## 2024-11-22 VITALS
HEART RATE: 102 BPM | TEMPERATURE: 98 F | SYSTOLIC BLOOD PRESSURE: 95 MMHG | OXYGEN SATURATION: 97 % | DIASTOLIC BLOOD PRESSURE: 59 MMHG | RESPIRATION RATE: 16 BRPM | HEIGHT: 62 IN | WEIGHT: 160.06 LBS

## 2024-11-22 DIAGNOSIS — Z98.890 OTHER SPECIFIED POSTPROCEDURAL STATES: Chronic | ICD-10-CM

## 2024-11-22 DIAGNOSIS — Z90.89 ACQUIRED ABSENCE OF OTHER ORGANS: Chronic | ICD-10-CM

## 2024-11-22 LAB
ALBUMIN SERPL ELPH-MCNC: 2.9 G/DL — LOW (ref 3.3–5.2)
ALP SERPL-CCNC: 184 U/L — HIGH (ref 40–120)
ALT FLD-CCNC: 20 U/L — SIGNIFICANT CHANGE UP
ANION GAP SERPL CALC-SCNC: 11 MMOL/L — SIGNIFICANT CHANGE UP (ref 5–17)
AST SERPL-CCNC: 40 U/L — HIGH
BILIRUB SERPL-MCNC: 0.7 MG/DL — SIGNIFICANT CHANGE UP (ref 0.4–2)
BLD GP AB SCN SERPL QL: SIGNIFICANT CHANGE UP
BUN SERPL-MCNC: 12.2 MG/DL — SIGNIFICANT CHANGE UP (ref 8–20)
CALCIUM SERPL-MCNC: 7.6 MG/DL — LOW (ref 8.4–10.5)
CHLORIDE SERPL-SCNC: 109 MMOL/L — HIGH (ref 96–108)
CO2 SERPL-SCNC: 22 MMOL/L — SIGNIFICANT CHANGE UP (ref 22–29)
CREAT SERPL-MCNC: 0.6 MG/DL — SIGNIFICANT CHANGE UP (ref 0.5–1.3)
EGFR: 118 ML/MIN/1.73M2 — SIGNIFICANT CHANGE UP
GLUCOSE SERPL-MCNC: 124 MG/DL — HIGH (ref 70–99)
HCT VFR BLD CALC: 21.3 % — LOW (ref 34.5–45)
HGB BLD-MCNC: 6.3 G/DL — CRITICAL LOW (ref 11.5–15.5)
MCHC RBC-ENTMCNC: 29.6 G/DL — LOW (ref 32–36)
MCHC RBC-ENTMCNC: 30 PG — SIGNIFICANT CHANGE UP (ref 27–34)
MCV RBC AUTO: 101.4 FL — HIGH (ref 80–100)
PLATELET # BLD AUTO: 186 K/UL — SIGNIFICANT CHANGE UP (ref 150–400)
POTASSIUM SERPL-MCNC: 3.4 MMOL/L — LOW (ref 3.5–5.3)
POTASSIUM SERPL-SCNC: 3.4 MMOL/L — LOW (ref 3.5–5.3)
PROT SERPL-MCNC: 4.8 G/DL — LOW (ref 6.6–8.7)
RBC # BLD: 2.1 M/UL — LOW (ref 3.8–5.2)
RBC # FLD: 20.8 % — HIGH (ref 10.3–14.5)
SODIUM SERPL-SCNC: 142 MMOL/L — SIGNIFICANT CHANGE UP (ref 135–145)
WBC # BLD: 6.51 K/UL — SIGNIFICANT CHANGE UP (ref 3.8–10.5)
WBC # FLD AUTO: 6.51 K/UL — SIGNIFICANT CHANGE UP (ref 3.8–10.5)

## 2024-11-22 PROCEDURE — 99285 EMERGENCY DEPT VISIT HI MDM: CPT

## 2024-11-22 PROCEDURE — 70450 CT HEAD/BRAIN W/O DYE: CPT | Mod: 26,MC

## 2024-11-22 RX ORDER — DEXAMETHASONE 1.5 MG/1
10 TABLET ORAL ONCE
Refills: 0 | Status: COMPLETED | OUTPATIENT
Start: 2024-11-22 | End: 2024-11-22

## 2024-11-22 RX ORDER — HYDROMORPHONE HYDROCHLORIDE 2 MG/1
2 TABLET ORAL ONCE
Refills: 0 | Status: DISCONTINUED | OUTPATIENT
Start: 2024-11-22 | End: 2024-11-22

## 2024-11-22 RX ORDER — ONDANSETRON HYDROCHLORIDE 4 MG/1
4 TABLET, FILM COATED ORAL ONCE
Refills: 0 | Status: COMPLETED | OUTPATIENT
Start: 2024-11-22 | End: 2024-11-22

## 2024-11-22 RX ADMIN — HYDROMORPHONE HYDROCHLORIDE 2 MILLIGRAM(S): 2 TABLET ORAL at 16:51

## 2024-11-22 RX ADMIN — DEXAMETHASONE 102 MILLIGRAM(S): 1.5 TABLET ORAL at 23:05

## 2024-11-22 RX ADMIN — HYDROMORPHONE HYDROCHLORIDE 2 MILLIGRAM(S): 2 TABLET ORAL at 23:36

## 2024-11-22 RX ADMIN — ONDANSETRON HYDROCHLORIDE 4 MILLIGRAM(S): 4 TABLET, FILM COATED ORAL at 16:21

## 2024-11-22 RX ADMIN — HYDROMORPHONE HYDROCHLORIDE 2 MILLIGRAM(S): 2 TABLET ORAL at 23:06

## 2024-11-22 RX ADMIN — HYDROMORPHONE HYDROCHLORIDE 2 MILLIGRAM(S): 2 TABLET ORAL at 16:21

## 2024-11-22 NOTE — ED PROVIDER NOTE - CLINICAL SUMMARY MEDICAL DECISION MAKING FREE TEXT BOX
39 year old female who presents from her Oncologist office who advised she come to the ER for a blood transfusion due to low blood count. Patient does not recall what the level in the office was, but states it was in the low 7s. Patient has a history of stage 4 Breast CA and recurrent anemia requiring multiple PRBCs. Upon assessment, patient states her only symptom is nausea.  pt states she feels like she is tremulous and having difficulty speaking   skin warm   will ct head   transfuse   reevaluate

## 2024-11-22 NOTE — CONSULT NOTE ADULT - ASSESSMENT
40 yo F known hx of breast cancer with mets to lung, liver, spleen, spine, bone and brain (diagnosed in 2021). Follows with Dr. Chapa at [facility]. Sent to ED from office to get transfused for anemia.   Had 15 minute episode of feeling "jittery", expressive aphasia, and slurred speech.     Plan:  -MRB w/wout  -EEG to r/o seizure  -decadron 2mgq8  -keppra 500 BID  - no surgical intervention at this time.     Case and plan discussed with Dr. Garrido  38 yo F known hx of breast cancer with mets to lung, liver, spleen, spine, bone and brain (diagnosed in 2021). Follows with Dr. Chapa at [facility]. Sent to ED from office to get transfused for anemia.   Had 15 minute episode of feeling "jittery", expressive aphasia, and slurred speech.     Plan:  -MRB w/wout  -EEG to r/o seizure  -decadron 2mgq8  -keppra 500 BID  -Hem/omc eval  -Neurology eval  - no surgical intervention at this time.     Case and plan discussed with Dr. Garrido

## 2024-11-22 NOTE — ED ADULT NURSE NOTE - OBJECTIVE STATEMENT
Pt A&OX4. Hx of cancer, sent in from oncologist due to "low hgb". Pt has felt more fatigued then usual, has a right sided chest wall chemo port. Denies SOB, palpitations, chest pain, fever, chills, body aches. VS stable, RR even and unlabored, safety maintained.

## 2024-11-22 NOTE — ED PROVIDER NOTE - PROGRESS NOTE DETAILS
Patient received at signout  pending CAT scan of the head for shakiness,  feeling like she cannot find her words and "feeling off".  patient assessed at bedside ANO x 3, mild tremors bilateral upper extremities noted.  Patient endorsed feeling like this prior to starting her transfusion. Prior to CAT scan called. RN aware. Meghann Purcell MD, Attending  nsgy recommending MR and EEG to r.o seizure. PT admitted to hospitalist. Meghann Purcell MD, Attending  Patient received at signout  pending CAT scan of the head for shakiness,  feeling like she cannot find her words and "feeling off".  patient assessed at bedside ANO x 3, mild tremors bilateral upper extremities noted.  Patient endorsed feeling like this prior to starting her transfusion. Priority CT scan called. RN aware.

## 2024-11-22 NOTE — ED PROVIDER NOTE - CARE PLAN
Principal Discharge DX:	Breast cancer metastasized to brain  Secondary Diagnosis:	AMS (altered mental status)   1

## 2024-11-22 NOTE — ED PROVIDER NOTE - OBJECTIVE STATEMENT
Patient is a 39 year old female who presents from her Oncologist office who advised she come to the ER for a blood transfusion due to low blood count. Patient does not recall what the level in the office was, but states it was in the low 7s. Patient has a history of stage 4 Breast CA and recurrent anemia requiring multiple PRBCs. Upon assessment, patient states her only symptom is nausea.

## 2024-11-23 DIAGNOSIS — C50.919 MALIGNANT NEOPLASM OF UNSPECIFIED SITE OF UNSPECIFIED FEMALE BREAST: ICD-10-CM

## 2024-11-23 LAB
ANION GAP SERPL CALC-SCNC: 10 MMOL/L — SIGNIFICANT CHANGE UP (ref 5–17)
ANISOCYTOSIS BLD QL: SLIGHT — SIGNIFICANT CHANGE UP
BASOPHILS # BLD AUTO: 0 K/UL — SIGNIFICANT CHANGE UP (ref 0–0.2)
BASOPHILS NFR BLD AUTO: 0 % — SIGNIFICANT CHANGE UP (ref 0–2)
BUN SERPL-MCNC: 8.3 MG/DL — SIGNIFICANT CHANGE UP (ref 8–20)
CALCIUM SERPL-MCNC: 7.8 MG/DL — LOW (ref 8.4–10.5)
CHLORIDE SERPL-SCNC: 104 MMOL/L — SIGNIFICANT CHANGE UP (ref 96–108)
CO2 SERPL-SCNC: 24 MMOL/L — SIGNIFICANT CHANGE UP (ref 22–29)
CREAT SERPL-MCNC: 0.53 MG/DL — SIGNIFICANT CHANGE UP (ref 0.5–1.3)
DACRYOCYTES BLD QL SMEAR: SLIGHT — SIGNIFICANT CHANGE UP
EGFR: 121 ML/MIN/1.73M2 — SIGNIFICANT CHANGE UP
ELLIPTOCYTES BLD QL SMEAR: SLIGHT — SIGNIFICANT CHANGE UP
EOSINOPHIL # BLD AUTO: 0 K/UL — SIGNIFICANT CHANGE UP (ref 0–0.5)
EOSINOPHIL NFR BLD AUTO: 0 % — SIGNIFICANT CHANGE UP (ref 0–6)
GLUCOSE SERPL-MCNC: 157 MG/DL — HIGH (ref 70–99)
HCT VFR BLD CALC: 28.5 % — LOW (ref 34.5–45)
HGB BLD-MCNC: 9.1 G/DL — LOW (ref 11.5–15.5)
LYMPHOCYTES # BLD AUTO: 0.05 K/UL — LOW (ref 1–3.3)
LYMPHOCYTES # BLD AUTO: 0.9 % — LOW (ref 13–44)
MACROCYTES BLD QL: SLIGHT — SIGNIFICANT CHANGE UP
MANUAL SMEAR VERIFICATION: SIGNIFICANT CHANGE UP
MCHC RBC-ENTMCNC: 29.2 PG — SIGNIFICANT CHANGE UP (ref 27–34)
MCHC RBC-ENTMCNC: 31.9 G/DL — LOW (ref 32–36)
MCV RBC AUTO: 91.3 FL — SIGNIFICANT CHANGE UP (ref 80–100)
MONOCYTES # BLD AUTO: 0 K/UL — SIGNIFICANT CHANGE UP (ref 0–0.9)
MONOCYTES NFR BLD AUTO: 0 % — LOW (ref 2–14)
NEUTROPHILS # BLD AUTO: 5.91 K/UL — SIGNIFICANT CHANGE UP (ref 1.8–7.4)
NEUTROPHILS NFR BLD AUTO: 98.2 % — HIGH (ref 43–77)
OVALOCYTES BLD QL SMEAR: SLIGHT — SIGNIFICANT CHANGE UP
PLAT MORPH BLD: NORMAL — SIGNIFICANT CHANGE UP
PLATELET # BLD AUTO: 173 K/UL — SIGNIFICANT CHANGE UP (ref 150–400)
POIKILOCYTOSIS BLD QL AUTO: SLIGHT — SIGNIFICANT CHANGE UP
POLYCHROMASIA BLD QL SMEAR: SLIGHT — SIGNIFICANT CHANGE UP
POTASSIUM SERPL-MCNC: 3.8 MMOL/L — SIGNIFICANT CHANGE UP (ref 3.5–5.3)
POTASSIUM SERPL-SCNC: 3.8 MMOL/L — SIGNIFICANT CHANGE UP (ref 3.5–5.3)
RBC # BLD: 3.12 M/UL — LOW (ref 3.8–5.2)
RBC # FLD: 23.5 % — HIGH (ref 10.3–14.5)
RBC BLD AUTO: ABNORMAL
SODIUM SERPL-SCNC: 138 MMOL/L — SIGNIFICANT CHANGE UP (ref 135–145)
VARIANT LYMPHS # BLD: 0.9 % — SIGNIFICANT CHANGE UP (ref 0–6)
WBC # BLD: 6.02 K/UL — SIGNIFICANT CHANGE UP (ref 3.8–10.5)
WBC # FLD AUTO: 6.02 K/UL — SIGNIFICANT CHANGE UP (ref 3.8–10.5)

## 2024-11-23 PROCEDURE — 99223 1ST HOSP IP/OBS HIGH 75: CPT

## 2024-11-23 PROCEDURE — 99222 1ST HOSP IP/OBS MODERATE 55: CPT

## 2024-11-23 PROCEDURE — 95720 EEG PHY/QHP EA INCR W/VEEG: CPT

## 2024-11-23 PROCEDURE — 99497 ADVNCD CARE PLAN 30 MIN: CPT | Mod: 25

## 2024-11-23 RX ORDER — ONDANSETRON HYDROCHLORIDE 4 MG/1
4 TABLET, FILM COATED ORAL EVERY 8 HOURS
Refills: 0 | Status: DISCONTINUED | OUTPATIENT
Start: 2024-11-23 | End: 2024-11-26

## 2024-11-23 RX ORDER — ALBUTEROL 90 MCG
2 AEROSOL (GRAM) INHALATION
Refills: 0 | DISCHARGE

## 2024-11-23 RX ORDER — PANTOPRAZOLE SODIUM 40 MG/1
40 TABLET, DELAYED RELEASE ORAL
Refills: 0 | Status: DISCONTINUED | OUTPATIENT
Start: 2024-11-23 | End: 2024-11-26

## 2024-11-23 RX ORDER — HYDROMORPHONE HYDROCHLORIDE 2 MG/1
8 TABLET ORAL EVERY 6 HOURS
Refills: 0 | Status: DISCONTINUED | OUTPATIENT
Start: 2024-11-23 | End: 2024-11-26

## 2024-11-23 RX ORDER — HYDROMORPHONE HYDROCHLORIDE 2 MG/1
2 TABLET ORAL
Refills: 0 | Status: DISCONTINUED | OUTPATIENT
Start: 2024-11-23 | End: 2024-11-26

## 2024-11-23 RX ORDER — ACETAMINOPHEN, DIPHENHYDRAMINE HCL, PHENYLEPHRINE HCL 325; 25; 5 MG/1; MG/1; MG/1
3 TABLET ORAL AT BEDTIME
Refills: 0 | Status: DISCONTINUED | OUTPATIENT
Start: 2024-11-23 | End: 2024-11-26

## 2024-11-23 RX ORDER — METHADONE HYDROCHLORIDE 10 MG/1
2 TABLET ORAL
Refills: 0 | DISCHARGE

## 2024-11-23 RX ORDER — HYDROMORPHONE HYDROCHLORIDE 2 MG/1
4 TABLET ORAL THREE TIMES A DAY
Refills: 0 | Status: DISCONTINUED | OUTPATIENT
Start: 2024-11-23 | End: 2024-11-26

## 2024-11-23 RX ORDER — HYDROMORPHONE HYDROCHLORIDE 2 MG/1
2 TABLET ORAL ONCE
Refills: 0 | Status: DISCONTINUED | OUTPATIENT
Start: 2024-11-23 | End: 2024-11-23

## 2024-11-23 RX ORDER — HYDROMORPHONE HYDROCHLORIDE 2 MG/1
2 TABLET ORAL THREE TIMES A DAY
Refills: 0 | Status: DISCONTINUED | OUTPATIENT
Start: 2024-11-23 | End: 2024-11-23

## 2024-11-23 RX ORDER — MEMANTINE HYDROCHLORIDE 14 MG/1
10 CAPSULE, EXTENDED RELEASE ORAL
Refills: 0 | Status: DISCONTINUED | OUTPATIENT
Start: 2024-11-23 | End: 2024-11-26

## 2024-11-23 RX ORDER — LEVETIRACETAM 1000 MG/1
500 TABLET ORAL
Refills: 0 | Status: DISCONTINUED | OUTPATIENT
Start: 2024-11-23 | End: 2024-11-23

## 2024-11-23 RX ORDER — POTASSIUM CHLORIDE 600 MG/1
40 TABLET, EXTENDED RELEASE ORAL ONCE
Refills: 0 | Status: COMPLETED | OUTPATIENT
Start: 2024-11-23 | End: 2024-11-23

## 2024-11-23 RX ORDER — ALBUTEROL 90 MCG
2 AEROSOL (GRAM) INHALATION EVERY 6 HOURS
Refills: 0 | Status: DISCONTINUED | OUTPATIENT
Start: 2024-11-23 | End: 2024-11-26

## 2024-11-23 RX ORDER — MAGNESIUM, ALUMINUM HYDROXIDE 200-225/5
30 SUSPENSION, ORAL (FINAL DOSE FORM) ORAL EVERY 4 HOURS
Refills: 0 | Status: DISCONTINUED | OUTPATIENT
Start: 2024-11-23 | End: 2024-11-26

## 2024-11-23 RX ORDER — DIAZEPAM 10 MG/1
2 TABLET ORAL EVERY 6 HOURS
Refills: 0 | Status: DISCONTINUED | OUTPATIENT
Start: 2024-11-23 | End: 2024-11-26

## 2024-11-23 RX ORDER — FUROSEMIDE 40 MG/1
40 TABLET ORAL DAILY
Refills: 0 | Status: DISCONTINUED | OUTPATIENT
Start: 2024-11-23 | End: 2024-11-26

## 2024-11-23 RX ORDER — METHYLPHENIDATE HYDROCHLORIDE 27 MG/1
20 TABLET, EXTENDED RELEASE ORAL
Refills: 0 | Status: DISCONTINUED | OUTPATIENT
Start: 2024-11-23 | End: 2024-11-26

## 2024-11-23 RX ORDER — ACETAMINOPHEN 500MG 500 MG/1
650 TABLET, COATED ORAL EVERY 6 HOURS
Refills: 0 | Status: DISCONTINUED | OUTPATIENT
Start: 2024-11-23 | End: 2024-11-26

## 2024-11-23 RX ORDER — DIAZEPAM 10 MG/1
1 FILM BUCCAL
Refills: 0 | DISCHARGE

## 2024-11-23 RX ORDER — PREGABALIN 75 MG/1
200 CAPSULE ORAL THREE TIMES A DAY
Refills: 0 | Status: DISCONTINUED | OUTPATIENT
Start: 2024-11-23 | End: 2024-11-26

## 2024-11-23 RX ORDER — DIAZEPAM 10 MG/1
1 TABLET ORAL
Refills: 0 | DISCHARGE

## 2024-11-23 RX ORDER — METHADONE HYDROCHLORIDE 5 MG/1
10 TABLET ORAL
Refills: 0 | Status: DISCONTINUED | OUTPATIENT
Start: 2024-11-23 | End: 2024-11-26

## 2024-11-23 RX ORDER — OCTREOTIDE ACETATE 500 UG/ML
100 INJECTION, SOLUTION INTRAVENOUS; SUBCUTANEOUS
Refills: 0 | Status: DISCONTINUED | OUTPATIENT
Start: 2024-11-23 | End: 2024-11-26

## 2024-11-23 RX ORDER — METHADONE HYDROCHLORIDE 5 MG/1
15 TABLET ORAL AT BEDTIME
Refills: 0 | Status: DISCONTINUED | OUTPATIENT
Start: 2024-11-23 | End: 2024-11-26

## 2024-11-23 RX ORDER — DEXAMETHASONE 1.5 MG/1
2 TABLET ORAL EVERY 8 HOURS
Refills: 0 | Status: DISCONTINUED | OUTPATIENT
Start: 2024-11-23 | End: 2024-11-26

## 2024-11-23 RX ORDER — ADO-TRASTUZUMAB EMTANSINE 20 MG/ML
227 INJECTION, POWDER, LYOPHILIZED, FOR SOLUTION INTRAVENOUS
Refills: 0 | DISCHARGE

## 2024-11-23 RX ADMIN — HYDROMORPHONE HYDROCHLORIDE 2 MILLIGRAM(S): 2 TABLET ORAL at 12:27

## 2024-11-23 RX ADMIN — METHYLPHENIDATE HYDROCHLORIDE 20 MILLIGRAM(S): 27 TABLET, EXTENDED RELEASE ORAL at 08:49

## 2024-11-23 RX ADMIN — HYDROMORPHONE HYDROCHLORIDE 8 MILLIGRAM(S): 2 TABLET ORAL at 15:44

## 2024-11-23 RX ADMIN — PANTOPRAZOLE SODIUM 40 MILLIGRAM(S): 40 TABLET, DELAYED RELEASE ORAL at 17:46

## 2024-11-23 RX ADMIN — HYDROMORPHONE HYDROCHLORIDE 2 MILLIGRAM(S): 2 TABLET ORAL at 02:36

## 2024-11-23 RX ADMIN — HYDROMORPHONE HYDROCHLORIDE 8 MILLIGRAM(S): 2 TABLET ORAL at 22:33

## 2024-11-23 RX ADMIN — OCTREOTIDE ACETATE 100 MICROGRAM(S): 500 INJECTION, SOLUTION INTRAVENOUS; SUBCUTANEOUS at 17:46

## 2024-11-23 RX ADMIN — DEXAMETHASONE 2 MILLIGRAM(S): 1.5 TABLET ORAL at 22:05

## 2024-11-23 RX ADMIN — HYDROMORPHONE HYDROCHLORIDE 2 MILLIGRAM(S): 2 TABLET ORAL at 03:06

## 2024-11-23 RX ADMIN — METHYLPHENIDATE HYDROCHLORIDE 20 MILLIGRAM(S): 27 TABLET, EXTENDED RELEASE ORAL at 14:32

## 2024-11-23 RX ADMIN — PANTOPRAZOLE SODIUM 40 MILLIGRAM(S): 40 TABLET, DELAYED RELEASE ORAL at 05:25

## 2024-11-23 RX ADMIN — DEXAMETHASONE 2 MILLIGRAM(S): 1.5 TABLET ORAL at 14:32

## 2024-11-23 RX ADMIN — METHADONE HYDROCHLORIDE 10 MILLIGRAM(S): 5 TABLET ORAL at 14:32

## 2024-11-23 RX ADMIN — PREGABALIN 200 MILLIGRAM(S): 75 CAPSULE ORAL at 06:24

## 2024-11-23 RX ADMIN — HYDROMORPHONE HYDROCHLORIDE 2 MILLIGRAM(S): 2 TABLET ORAL at 16:45

## 2024-11-23 RX ADMIN — PREGABALIN 200 MILLIGRAM(S): 75 CAPSULE ORAL at 22:02

## 2024-11-23 RX ADMIN — HYDROMORPHONE HYDROCHLORIDE 2 MILLIGRAM(S): 2 TABLET ORAL at 17:45

## 2024-11-23 RX ADMIN — HYDROMORPHONE HYDROCHLORIDE 2 MILLIGRAM(S): 2 TABLET ORAL at 07:30

## 2024-11-23 RX ADMIN — LEVETIRACETAM 500 MILLIGRAM(S): 1000 TABLET ORAL at 05:25

## 2024-11-23 RX ADMIN — MEMANTINE HYDROCHLORIDE 10 MILLIGRAM(S): 14 CAPSULE, EXTENDED RELEASE ORAL at 17:40

## 2024-11-23 RX ADMIN — HYDROMORPHONE HYDROCHLORIDE 8 MILLIGRAM(S): 2 TABLET ORAL at 22:03

## 2024-11-23 RX ADMIN — Medication 2 GRAM(S): at 17:40

## 2024-11-23 RX ADMIN — Medication 2 GRAM(S): at 06:24

## 2024-11-23 RX ADMIN — METHADONE HYDROCHLORIDE 10 MILLIGRAM(S): 5 TABLET ORAL at 08:50

## 2024-11-23 RX ADMIN — FUROSEMIDE 40 MILLIGRAM(S): 40 TABLET ORAL at 06:24

## 2024-11-23 RX ADMIN — MEMANTINE HYDROCHLORIDE 10 MILLIGRAM(S): 14 CAPSULE, EXTENDED RELEASE ORAL at 06:25

## 2024-11-23 RX ADMIN — OCTREOTIDE ACETATE 100 MICROGRAM(S): 500 INJECTION, SOLUTION INTRAVENOUS; SUBCUTANEOUS at 06:25

## 2024-11-23 RX ADMIN — HYDROMORPHONE HYDROCHLORIDE 8 MILLIGRAM(S): 2 TABLET ORAL at 14:44

## 2024-11-23 RX ADMIN — HYDROMORPHONE HYDROCHLORIDE 2 MILLIGRAM(S): 2 TABLET ORAL at 06:53

## 2024-11-23 RX ADMIN — HYDROMORPHONE HYDROCHLORIDE 2 MILLIGRAM(S): 2 TABLET ORAL at 11:09

## 2024-11-23 RX ADMIN — Medication 80 MILLIGRAM(S): at 22:04

## 2024-11-23 RX ADMIN — PREGABALIN 200 MILLIGRAM(S): 75 CAPSULE ORAL at 14:32

## 2024-11-23 RX ADMIN — METHADONE HYDROCHLORIDE 15 MILLIGRAM(S): 5 TABLET ORAL at 22:05

## 2024-11-23 RX ADMIN — POTASSIUM CHLORIDE 40 MILLIEQUIVALENT(S): 600 TABLET, EXTENDED RELEASE ORAL at 06:24

## 2024-11-23 RX ADMIN — DEXAMETHASONE 2 MILLIGRAM(S): 1.5 TABLET ORAL at 05:25

## 2024-11-23 NOTE — PATIENT PROFILE ADULT - FALL HARM RISK - HARM RISK INTERVENTIONS

## 2024-11-23 NOTE — PROGRESS NOTE ADULT - ASSESSMENT
38 yo F known hx of breast cancer with mets to lung, liver, spleen, spine, bone and brain (diagnosed in 2021). Follows with Dr. Chapa at [facility]. Sent to ED from office to get transfused for anemia.   Had 15 minute episode of feeling "jittery", expressive aphasia, and slurred speech.     Plan:  - No acute neurosurgical intervention  - EEG to r/o seizure, placed on Keppra 500mg BID on admission for ?seisuzure.    - Pending Neurology consult, Would follow Neurology recommendations for seizure ppx pending EEG.   - Started on decadron 2mgq8, would recommend continuing if primary oncology team agrees  - Hem/omc eval pending, patient of [facility].   - Neurosurgery to sign-off at this time, please re-consult if needed.     Case and plan discussed with Dr. Garrido

## 2024-11-23 NOTE — ED ADULT NURSE REASSESSMENT NOTE - NS ED NURSE REASSESS COMMENT FT1
Patient requesting pain medication. patient states that at home she takes Dilaudid 4mg-8mg every 4-6 hours prn. Dr Bernal made aware and will place order

## 2024-11-23 NOTE — CONSULT NOTE ADULT - ASSESSMENT
39 yr old female with metastatic breast cancer, ER/IL neg, Her-2 pos, currently on chemotherapy, chronic pain, anxiety/depression, prior symptomatic anemia presents today for evaluation of low Hb. GI asked to consult for anemia.    #anemia   #metastatic breast cancer  hgb on arrival to ER was 6.3, she received 2 units PRBC, repeat hgb is 9.1  pt with extensive GI workup in the past  EGD/Colonoscopy (10/30/24) showing gastritis and colon with poor prep but normal mucosa   WILLIE with maroon stool   -Trend CBC daily, transfuse prn hgb <7, monitor for further bleeding   -PPI BID for GI mucosal cytoprotection  -avoid NSAIDs  -diet as tolerated  -will tentatively plan for colonoscopy on Monday 11/25/24  -CLD on Sunday 11/24/24  _________________________________________________________________  Assessment and recommendations are final when note is signed by the attending physician.    39 yr old female with metastatic breast cancer, ER/SC neg, Her-2 pos, currently on chemotherapy, chronic pain, anxiety/depression, prior symptomatic anemia presents today for evaluation of low Hb. GI asked to consult for anemia.    #anemia   #metastatic breast cancer  hgb on arrival to ER was 6.3, she received 2 units PRBC, repeat hgb is 9.1  pt with extensive GI workup in the past  Prior endoscopic evaluation:  - VCE on 12/11/23 revealed enteritis  - Colonoscopy on 11/20 showed internal hemorrhoids  - EGD/ colonoscopy (10/2023) at [facility] showed antral erythema, colonoscopy poor prep  - 1/4/2024 CT A/P (CT Enterography): mild concentric wall thickening involving gastric antrum/pylorus, mild mucosal enhancement cecum and ascending colon with minimal pericolic stranding extending along the right paracolic gutter and posterior plank, generalized colonic fecal retention with mild air distended rectum, no bowel obstruction  - 1/8/2024 EGD: stomach mucosa with localized erythema and congestion was noted in the pre-pyloric region compatible with non-erosive gastritis  - EGD/Colonoscopy (10/30/24) showing gastritis and colon with poor prep but normal mucosa   WILLIE today with maroon stool   -Trend CBC daily, transfuse prn hgb <7, monitor for further bleeding   -PPI BID for GI mucosal cytoprotection  -avoid NSAIDs  -diet as tolerated  -will tentatively plan for colonoscopy on Monday 11/25/24  -CLD on Sunday 11/24/24  _________________________________________________________________  Assessment and recommendations are final when note is signed by the attending physician.

## 2024-11-23 NOTE — PROGRESS NOTE ADULT - SUBJECTIVE AND OBJECTIVE BOX
NEUROSUGERY PA PROGRESS NOTE:    40 yo F known hx of breast cancer with mets to lung, liver, spleen, spine, bone and brain (diagnosed in 2021). Follows with Dr. Chapa at [facility]. Had whoel brain radiation x 2 and gamma knife x 2 (last one was in oct 2024). Currently on Chemo. Was sent from her doctors office for a blood transfusion for anemia. While here she had a 15 minute episode of feeling “jittery” in upper and lower extremities that she could not control. Along with expressive aphasia and slurred speech. It resolved mostly, still feels like her speech is a little slurred.    CTH due to this episode and it shows stable areas of vasogenic edema so they called us. ED gave her 1 dose of 10mg decadron.   Denies weakness, hx of seizures, amnesia, n/v, blurry vision.        MEDICATIONS  (STANDING):  dexAMETHasone     Tablet 2 milliGRAM(s) Oral every 8 hours  FLUoxetine 80 milliGRAM(s) Oral at bedtime  furosemide    Tablet 40 milliGRAM(s) Oral daily  levETIRAcetam 500 milliGRAM(s) Oral two times a day  memantine 10 milliGRAM(s) Oral two times a day  methadone    Tablet 10 milliGRAM(s) Oral <User Schedule>  methadone    Tablet 15 milliGRAM(s) Oral at bedtime  methylphenidate 20 milliGRAM(s) Oral <User Schedule>  octreotide  Injectable 100 MICROGram(s) SubCutaneous two times a day  omega-3-Acid Ethyl Esters 2 Gram(s) Oral two times a day  pantoprazole  Injectable 40 milliGRAM(s) IV Push two times a day  pregabalin 200 milliGRAM(s) Oral three times a day    MEDICATIONS  (PRN):  acetaminophen     Tablet .. 650 milliGRAM(s) Oral every 6 hours PRN Temp greater or equal to 38C (100.4F), Mild Pain (1 - 3)  albuterol    90 MICROgram(s) HFA Inhaler 2 Puff(s) Inhalation every 6 hours PRN Shortness of Breath and/or Wheezing  aluminum hydroxide/magnesium hydroxide/simethicone Suspension 30 milliLiter(s) Oral every 4 hours PRN Dyspepsia  diazepam    Tablet 2 milliGRAM(s) Oral every 6 hours PRN for anxiety  HYDROmorphone   Tablet 4 milliGRAM(s) Oral three times a day PRN for moderate pain  HYDROmorphone   Tablet 8 milliGRAM(s) Oral every 6 hours PRN Severe Pain (7 - 10)  HYDROmorphone  Injectable 2 milliGRAM(s) IV Push three times a day PRN breakthrough pain  melatonin 3 milliGRAM(s) Oral at bedtime PRN Insomnia  ondansetron Injectable 4 milliGRAM(s) IV Push every 8 hours PRN Nausea and/or Vomiting      Vital Signs Last 24 Hrs  T(C): 36.7 (23 Nov 2024 07:28), Max: 36.9 (23 Nov 2024 02:19)  T(F): 98.1 (23 Nov 2024 07:28), Max: 98.5 (23 Nov 2024 02:19)  HR: 87 (23 Nov 2024 07:28) (78 - 102)  BP: 104/70 (23 Nov 2024 07:28) (95/59 - 107/64)  BP(mean): 75 (23 Nov 2024 03:34) (75 - 75)  RR: 17 (23 Nov 2024 07:28) (16 - 20)  SpO2: 94% (23 Nov 2024 07:28) (94% - 99%)    Parameters below as of 23 Nov 2024 07:28  Patient On (Oxygen Delivery Method): room air        PHYSICAL EXAM:  axo x 3. fully intact. PERRL. EOM intact. 5/5 in UE +LE. No drifts.    Speech does sound a little slurred but improved from her jittery episode.    LABS:                                   6.3    6.51  )-----------( 186      ( 22 Nov 2024 16:00 )             21.3     11-22    142  |  109[H]  |  12.2  ----------------------------<  124[H]  3.4[L]   |  22.0  |  0.60    Ca    7.6[L]      22 Nov 2024 16:00    TPro  4.8[L]  /  Alb  2.9[L]  /  TBili  0.7  /  DBili  x   /  AST  40[H]  /  ALT  20  /  AlkPhos  184[H]  11-22      RADIOLOGY & ADDITIONAL STUDIES:  < from: CT Head No Cont (11.22.24 @ 20:03) >  CONTRAST:  IV Contrast: NONE  TECHNIQUE:  CT of the head without contrast.  Contiguous transaxial images of the head were acquired from the skull   base to the vertex without the administration of iodinated contrast.   Coronal andsagittal reformatted images are provided.  FINDINGS:  INTRA-AXIAL: No acute hemorrhage or signs for acute large vascular   territory infarct. Vasogenic edema in the right greater than left   cerebellum extending into the right middle cerebellar peduncle,   unchanged. Extensive white matter low attenuation, similar to T2/FLAIR   hyperintensity on the recent MRI, unchanged. Lesion in the high left   frontoparietal paramedian region on the prior MRI not clearly visualized   on the current exam. Stable low-attenuation in the left thalamus.  EXTRA-AXIAL: No mass or collection. Basal cisterns are clear.  VENTRICLES: Stable in size and configuration. No acute hydrocephalus.  VISUALIZED PARANASAL SINUSES: Clear.  MASTOID AIR CELLS: Partial opacification right mastoid air cells. Left   clear.  CALVARIUM: Intact.  SOFT TISSUES: Unremarkable.  ORBITS: Unremarkable.  MISCELLANEOUS: None.  IMPRESSION:  No acute hemorrhage. Stable areas of vasogenic edema and extensive white   matter low attenuation.  --- End of Report ---  < end of copied text >   NEUROSUGERY PA PROGRESS NOTE:    38 yo F known hx of breast cancer with mets to lung, liver, spleen, spine, bone and brain (diagnosed in 2021). Follows with Dr. Chapa at [facility]. Had whoel brain radiation x 2 and gamma knife x 2 (last one was in oct 2024). Currently on Chemo. Was sent from her doctors office for a blood transfusion for anemia. While here she had a 15 minute episode of feeling “jittery” in upper and lower extremities that she could not control. Along with expressive aphasia and slurred speech. It resolved mostly, still feels like her speech is a little slurred.    CTH due to this episode and it shows stable areas of vasogenic edema so they called us. ED gave her 1 dose of 10mg decadron.   Denies weakness, hx of seizures, amnesia, n/v, blurry vision.        MEDICATIONS  (STANDING):  dexAMETHasone     Tablet 2 milliGRAM(s) Oral every 8 hours  FLUoxetine 80 milliGRAM(s) Oral at bedtime  furosemide    Tablet 40 milliGRAM(s) Oral daily  levETIRAcetam 500 milliGRAM(s) Oral two times a day  memantine 10 milliGRAM(s) Oral two times a day  methadone    Tablet 10 milliGRAM(s) Oral <User Schedule>  methadone    Tablet 15 milliGRAM(s) Oral at bedtime  methylphenidate 20 milliGRAM(s) Oral <User Schedule>  octreotide  Injectable 100 MICROGram(s) SubCutaneous two times a day  omega-3-Acid Ethyl Esters 2 Gram(s) Oral two times a day  pantoprazole  Injectable 40 milliGRAM(s) IV Push two times a day  pregabalin 200 milliGRAM(s) Oral three times a day    MEDICATIONS  (PRN):  acetaminophen     Tablet .. 650 milliGRAM(s) Oral every 6 hours PRN Temp greater or equal to 38C (100.4F), Mild Pain (1 - 3)  albuterol    90 MICROgram(s) HFA Inhaler 2 Puff(s) Inhalation every 6 hours PRN Shortness of Breath and/or Wheezing  aluminum hydroxide/magnesium hydroxide/simethicone Suspension 30 milliLiter(s) Oral every 4 hours PRN Dyspepsia  diazepam    Tablet 2 milliGRAM(s) Oral every 6 hours PRN for anxiety  HYDROmorphone   Tablet 4 milliGRAM(s) Oral three times a day PRN for moderate pain  HYDROmorphone   Tablet 8 milliGRAM(s) Oral every 6 hours PRN Severe Pain (7 - 10)  HYDROmorphone  Injectable 2 milliGRAM(s) IV Push three times a day PRN breakthrough pain  melatonin 3 milliGRAM(s) Oral at bedtime PRN Insomnia  ondansetron Injectable 4 milliGRAM(s) IV Push every 8 hours PRN Nausea and/or Vomiting      Vital Signs Last 24 Hrs  T(C): 36.7 (23 Nov 2024 07:28), Max: 36.9 (23 Nov 2024 02:19)  T(F): 98.1 (23 Nov 2024 07:28), Max: 98.5 (23 Nov 2024 02:19)  HR: 87 (23 Nov 2024 07:28) (78 - 102)  BP: 104/70 (23 Nov 2024 07:28) (95/59 - 107/64)  BP(mean): 75 (23 Nov 2024 03:34) (75 - 75)  RR: 17 (23 Nov 2024 07:28) (16 - 20)  SpO2: 94% (23 Nov 2024 07:28) (94% - 99%)    Parameters below as of 23 Nov 2024 07:28  Patient On (Oxygen Delivery Method): room air        PHYSICAL EXAM:  axo x 3., sleepy, dysarthria present. PERRL, EOM-I, DENNEY good strength.        LABS:                                   6.3    6.51  )-----------( 186      ( 22 Nov 2024 16:00 )             21.3     11-22    142  |  109[H]  |  12.2  ----------------------------<  124[H]  3.4[L]   |  22.0  |  0.60    Ca    7.6[L]      22 Nov 2024 16:00    TPro  4.8[L]  /  Alb  2.9[L]  /  TBili  0.7  /  DBili  x   /  AST  40[H]  /  ALT  20  /  AlkPhos  184[H]  11-22      RADIOLOGY & ADDITIONAL STUDIES:  < from: CT Head No Cont (11.22.24 @ 20:03) >  CONTRAST:  IV Contrast: NONE  TECHNIQUE:  CT of the head without contrast.  Contiguous transaxial images of the head were acquired from the skull   base to the vertex without the administration of iodinated contrast.   Coronal andsagittal reformatted images are provided.  FINDINGS:  INTRA-AXIAL: No acute hemorrhage or signs for acute large vascular   territory infarct. Vasogenic edema in the right greater than left   cerebellum extending into the right middle cerebellar peduncle,   unchanged. Extensive white matter low attenuation, similar to T2/FLAIR   hyperintensity on the recent MRI, unchanged. Lesion in the high left   frontoparietal paramedian region on the prior MRI not clearly visualized   on the current exam. Stable low-attenuation in the left thalamus.  EXTRA-AXIAL: No mass or collection. Basal cisterns are clear.  VENTRICLES: Stable in size and configuration. No acute hydrocephalus.  VISUALIZED PARANASAL SINUSES: Clear.  MASTOID AIR CELLS: Partial opacification right mastoid air cells. Left   clear.  CALVARIUM: Intact.  SOFT TISSUES: Unremarkable.  ORBITS: Unremarkable.  MISCELLANEOUS: None.  IMPRESSION:  No acute hemorrhage. Stable areas of vasogenic edema and extensive white   matter low attenuation.  --- End of Report ---  < end of copied text >

## 2024-11-23 NOTE — CONSULT NOTE ADULT - SUBJECTIVE AND OBJECTIVE BOX
[facility]                                     Neu[facility] at [place]                                 Raul Florentino, & Naveed                                  [address]                                        [place], [place], [address]                                             [phone]    CC: ? seizure  HPI:  The patient is a 39y Female who presented with need for blood transfusion and in ED had 15 minute episode where she felt "jittery" tremulous with some difficulty speaking with no LOC.  She has recent admission for possible seizure and had continuous EEG without epileptiform activity.  She thinks that yesterday's episode is likely due to hypoglycemia as she had not eaten and this has happened before.  neurology is asked to evaluate.    PAST MEDICAL & SURGICAL HISTORY:  H/O compression fracture of spine      Anxiety      Metastatic breast cancer      H/O pleural effusion      Pericardial effusion      S/P tonsillectomy      H/O chest tube placement  12/23/21      S/P pericardiocentesis  12/28/21      MEDICATIONS  (STANDING):  dexAMETHasone     Tablet 2 milliGRAM(s) Oral every 8 hours  FLUoxetine 80 milliGRAM(s) Oral at bedtime  furosemide    Tablet 40 milliGRAM(s) Oral daily  levETIRAcetam 500 milliGRAM(s) Oral two times a day  memantine 10 milliGRAM(s) Oral two times a day  methadone    Tablet 10 milliGRAM(s) Oral <User Schedule>  methadone    Tablet 15 milliGRAM(s) Oral at bedtime  methylphenidate 20 milliGRAM(s) Oral <User Schedule>  octreotide  Injectable 100 MICROGram(s) SubCutaneous two times a day  omega-3-Acid Ethyl Esters 2 Gram(s) Oral two times a day  pantoprazole  Injectable 40 milliGRAM(s) IV Push two times a day  pregabalin 200 milliGRAM(s) Oral three times a day    MEDICATIONS  (PRN):  acetaminophen     Tablet .. 650 milliGRAM(s) Oral every 6 hours PRN Temp greater or equal to 38C (100.4F), Mild Pain (1 - 3)  albuterol    90 MICROgram(s) HFA Inhaler 2 Puff(s) Inhalation every 6 hours PRN Shortness of Breath and/or Wheezing  aluminum hydroxide/magnesium hydroxide/simethicone Suspension 30 milliLiter(s) Oral every 4 hours PRN Dyspepsia  diazepam    Tablet 2 milliGRAM(s) Oral every 6 hours PRN for anxiety  HYDROmorphone   Tablet 4 milliGRAM(s) Oral three times a day PRN for moderate pain  HYDROmorphone   Tablet 8 milliGRAM(s) Oral every 6 hours PRN Severe Pain (7 - 10)  HYDROmorphone  Injectable 2 milliGRAM(s) IV Push three times a day PRN breakthrough pain  melatonin 3 milliGRAM(s) Oral at bedtime PRN Insomnia  ondansetron Injectable 4 milliGRAM(s) IV Push every 8 hours PRN Nausea and/or Vomiting      Allergies    Perjeta (Other (Severe))  pertuzumab (Other (Severe))    Intolerances        SOCIAL HISTORY:  no tob,   no alcohol   no drugs    FAMILY HISTORY:  FH: CVA (cerebrovascular accident)    ROS: 14 point ROS negative other than what is present in HPI or below    Vital Signs Last 24 Hrs  T(C): 36.7 (23 Nov 2024 07:28), Max: 36.9 (23 Nov 2024 02:19)  T(F): 98.1 (23 Nov 2024 07:28), Max: 98.5 (23 Nov 2024 02:19)  HR: 87 (23 Nov 2024 07:28) (78 - 102)  BP: 104/70 (23 Nov 2024 07:28) (95/59 - 107/64)  BP(mean): 75 (23 Nov 2024 03:34) (75 - 75)  RR: 17 (23 Nov 2024 07:28) (16 - 20)  SpO2: 94% (23 Nov 2024 07:28) (94% - 99%)    Parameters below as of 23 Nov 2024 07:28  Patient On (Oxygen Delivery Method): room air      General: NAD    Detailed Neurologic Exam:    Mental status: The patient is awake and alert and has normal attention span.  The patient is fully oriented in 3 spheres. The patient is oriented to current events. The patient is able to name objects, follow commands, repeat sentences.    Cranial nerves: Pupils equal and react symmetrically to light. There is no visual field deficit to confrontation. Extraocular motion is full with no nystagmus. There is no ptosis. Facial sensation is intact. Facial musculature is symmetric. Palate elevates symmetrically. Tongue is midline.    Motor: There is normal bulk and tone.  There is no tremor.  Strength is 5/5 in the right arm and leg.   Strength is 5/5 in the left arm and leg.    Sensation: Intact to light touch and pin in 4 extremities    Cerebellar: There is no dysmetria on finger to nose testing.    Gait : deferred    LABS:                         6.3    6.51  )-----------( 186      ( 22 Nov 2024 16:00 )             21.3       11-22    142  |  109[H]  |  12.2  ----------------------------<  124[H]  3.4[L]   |  22.0  |  0.60    Ca    7.6[L]      22 Nov 2024 16:00    TPro  4.8[L]  /  Alb  2.9[L]  /  TBili  0.7  /  DBili  x   /  AST  40[H]  /  ALT  20  /  AlkPhos  184[H]  11-22      RADIOLOGY & ADDITIONAL STUDIES (independently reviewed unless otherwise noted):  CT Head No Cont (11.22.24 @ 20:03)   IMPRESSION:  No acute hemorrhage. Stable areas of vasogenic edema and extensive white   matter low attenuation.      
NEUROSUGERY PA CONSULT NOTE  HISTORY OF PRESENT ILLNESS:   38 yo F known hx of breast cancer with mets to lung, liver, spleen, spine, bone and brain (diagnosed in 2021). Follows with Dr. Chapa at [facility]. Had whoel brain radiation x 2 and gamma knife x 2 (last one was in oct 2024). Currently on Chemo. Was sent from her doctors office for a blood transfusion for anemia. While here she had a 15 minute episode of feeling “jittery” in upper and lower extremities that she could not control. Along with expressive aphasia and slurred speech. It resolved mostly, still feels like her speech is a little slurred.    CTH due to this episode and it shows stable areas of vasogenic edema so they called us. ED gave her 1 dose of 10mg decadron.   Denies weakness, hx of seizures, amnesia, n/v, blurry vision.      PAST MEDICAL & SURGICAL HISTORY:  H/O compression fracture of spine  Anxiety  Metastatic breast cancer  H/O pleural effusion  Pericardial effusion  S/P tonsillectomy  H/O chest tube placement  12/23/21  S/P pericardiocentesis  12/28/21    FAMILY HISTORY:  FH: CVA (cerebrovascular accident)    Allergies  Perjeta (Other (Severe))  pertuzumab (Other (Severe))    Intolerances    REVIEW OF SYSTEMS  As noted in HPI    HOME MEDICATIONS:  Home Medications:  diazePAM 5 mg oral tablet: 1 tab(s) orally 2 times a day (15 Nov 2024 18:11)  Dilaudid 4 mg oral tablet: 1 tab(s) orally 3 times a day as needed for  moderate pain (15 Nov 2024 18:11)  FLUoxetine 40 mg oral capsule: 2 cap(s) orally once a day (at bedtime) (15 Nov 2024 18:11)  methadone 5 mg oral tablet: 2 tab(s) orally once a day (at bedtime) (15 Nov 2024 18:11)  methadone 5 mg oral tablet: 1 tab(s) orally 2 times a day (15 Nov 2024 18:11)  Namenda 10 mg oral tablet: 1 tab(s) orally 2 times a day (15 Nov 2024 18:11)  octreotide 100 mcg/mL injectable solution: 100 microgram(s) 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 (15 Nov 2024 18:11)  pregabalin 200 mg oral capsule: 1 cap(s) orally 3 times a day (15 Nov 2024 18:11)  Ritalin 20 mg oral tablet: 1 tab(s) orally 2 times a day (15 Nov 2024 18:11)    Vital Signs Last 24 Hrs  T(C): 36.6 (22 Nov 2024 21:45), Max: 36.7 (22 Nov 2024 14:10)  T(F): 97.9 (22 Nov 2024 21:45), Max: 98.1 (22 Nov 2024 14:10)  HR: 91 (22 Nov 2024 21:45) (91 - 102)  BP: 98/63 (22 Nov 2024 21:45) (95/59 - 98/63)  BP(mean): --  RR: 18 (22 Nov 2024 21:45) (16 - 18)  SpO2: 99% (22 Nov 2024 21:45) (97% - 99%)  Parameters below as of 22 Nov 2024 21:45  Patient On (Oxygen Delivery Method): room air    PHYSICAL EXAM:  axo x 3. fully intact. PERRL. EOM intact. 5/5 in UE +LE. No drifts.    Speech does sound a little slurred but improved from her jittery episode.    LABS:                        6.3    6.51  )-----------( 186      ( 22 Nov 2024 16:00 )             21.3     11-22    142  |  109[H]  |  12.2  ----------------------------<  124[H]  3.4[L]   |  22.0  |  0.60    Ca    7.6[L]      22 Nov 2024 16:00    TPro  4.8[L]  /  Alb  2.9[L]  /  TBili  0.7  /  DBili  x   /  AST  40[H]  /  ALT  20  /  AlkPhos  184[H]  11-22      Urinalysis Basic - ( 22 Nov 2024 16:00 )    Color: x / Appearance: x / SG: x / pH: x  Gluc: 124 mg/dL / Ketone: x  / Bili: x / Urobili: x   Blood: x / Protein: x / Nitrite: x   Leuk Esterase: x / RBC: x / WBC x   Sq Epi: x / Non Sq Epi: x / Bacteria: x    RADIOLOGY & ADDITIONAL STUDIES:  < from: CT Head No Cont (11.22.24 @ 20:03) >  CONTRAST:  IV Contrast: NONE  TECHNIQUE:  CT of the head without contrast.  Contiguous transaxial images of the head were acquired from the skull   base to the vertex without the administration of iodinated contrast.   Coronal andsagittal reformatted images are provided.  FINDINGS:  INTRA-AXIAL: No acute hemorrhage or signs for acute large vascular   territory infarct. Vasogenic edema in the right greater than left   cerebellum extending into the right middle cerebellar peduncle,   unchanged. Extensive white matter low attenuation, similar to T2/FLAIR   hyperintensity on the recent MRI, unchanged. Lesion in the high left   frontoparietal paramedian region on the prior MRI not clearly visualized   on the current exam. Stable low-attenuation in the left thalamus.  EXTRA-AXIAL: No mass or collection. Basal cisterns are clear.  VENTRICLES: Stable in size and configuration. No acute hydrocephalus.  VISUALIZED PARANASAL SINUSES: Clear.  MASTOID AIR CELLS: Partial opacification right mastoid air cells. Left   clear.  CALVARIUM: Intact.  SOFT TISSUES: Unremarkable.  ORBITS: Unremarkable.  MISCELLANEOUS: None.  IMPRESSION:  No acute hemorrhage. Stable areas of vasogenic edema and extensive white   matter low attenuation.  --- End of Report ---  < end of copied text >  
    Chief Complaint:  Patient is a 39y old  Female who presents with a chief complaint of Acute on Chronic Anemia  Seizure like activity (23 Nov 2024 10:38)      HPI: 38 yo female with metastatic breast cancer ER/ID Neg, HER-2 pos on chemotherapy (mets to lung, liver, spleen, spine, bone and brain) presenting to the ED from Oncologist office for blood transfusion. GI asked to consult for GIB and anemia. Pt has been admitted for similar symptoms multiple times in the past. She has had multiple blood transfusions, most recently end of October 2024. At that time, she had EGD/colonoscopy (10/20/24) which revealed just gastritis. She reports intermittent dark stools, has been following with Dr. Rosado GI and is currently on PPI and Octreotide. On arrival to ER, pt hgb was 6.3. She received 2 units PRBC with repeat hgb 9.1. Denies abdominal pain, vomiting, fever, chills.       PAST MEDICAL & SURGICAL HISTORY:  H/O compression fracture of spine      Anxiety      Metastatic breast cancer      H/O pleural effusion      Pericardial effusion      S/P tonsillectomy      H/O chest tube placement  12/23/21      S/P pericardiocentesis  12/28/21          REVIEW OF SYSTEMS:   General: Negative  HEENT: Negative  CV: Negative  Respiratory: Negative  GI: See HPI  : Negative  MSK: Negative  Hematologic: Negative  Skin: Negative    MEDICATIONS:   MEDICATIONS  (STANDING):  dexAMETHasone     Tablet 2 milliGRAM(s) Oral every 8 hours  FLUoxetine 80 milliGRAM(s) Oral at bedtime  furosemide    Tablet 40 milliGRAM(s) Oral daily  memantine 10 milliGRAM(s) Oral two times a day  methadone    Tablet 10 milliGRAM(s) Oral <User Schedule>  methadone    Tablet 15 milliGRAM(s) Oral at bedtime  methylphenidate 20 milliGRAM(s) Oral <User Schedule>  octreotide  Injectable 100 MICROGram(s) SubCutaneous two times a day  omega-3-Acid Ethyl Esters 2 Gram(s) Oral two times a day  pantoprazole  Injectable 40 milliGRAM(s) IV Push two times a day  pregabalin 200 milliGRAM(s) Oral three times a day    MEDICATIONS  (PRN):  acetaminophen     Tablet .. 650 milliGRAM(s) Oral every 6 hours PRN Temp greater or equal to 38C (100.4F), Mild Pain (1 - 3)  albuterol    90 MICROgram(s) HFA Inhaler 2 Puff(s) Inhalation every 6 hours PRN Shortness of Breath and/or Wheezing  aluminum hydroxide/magnesium hydroxide/simethicone Suspension 30 milliLiter(s) Oral every 4 hours PRN Dyspepsia  diazepam    Tablet 2 milliGRAM(s) Oral every 6 hours PRN for anxiety  HYDROmorphone   Tablet 4 milliGRAM(s) Oral three times a day PRN for moderate pain  HYDROmorphone   Tablet 8 milliGRAM(s) Oral every 6 hours PRN Severe Pain (7 - 10)  HYDROmorphone  Injectable 2 milliGRAM(s) IV Push three times a day PRN breakthrough pain  melatonin 3 milliGRAM(s) Oral at bedtime PRN Insomnia  ondansetron Injectable 4 milliGRAM(s) IV Push every 8 hours PRN Nausea and/or Vomiting      Packed Red Cells Order:  1 Unit  Indication: Hgb <7 gm/dL  Infuse Unit : 2 Hours (11-22-24 @ 17:22)  Packed Red Cells Order:  1 Unit  Indication: Hgb <7 gm/dL  Infuse Unit : 2 Hours (11-22-24 @ 17:22)      DIET:  Diet, Regular (11-23-24 @ 03:48) [Active]          ALLERGIES:   Allergies    Perjeta (Other (Severe))  pertuzumab (Other (Severe))    Intolerances        Substance Use:   (  ) never used  (  ) other:  Tobacco Usage:  (   ) never smoked   (   ) former smoker   (   ) current smoker  (     ) pack year  (        ) last cigarette date  Alcohol Usage:    Family History   IBD (  ) Yes   (  ) No  GI Malignancy (  )  Yes    (  ) No    Health Management  Last Colonoscopy:  Last Endoscopy:     VITAL SIGNS:   Vital Signs Last 24 Hrs  T(C): 36.8 (23 Nov 2024 12:06), Max: 36.9 (23 Nov 2024 02:19)  T(F): 98.2 (23 Nov 2024 12:06), Max: 98.5 (23 Nov 2024 02:19)  HR: 83 (23 Nov 2024 12:06) (77 - 102)  BP: 96/66 (23 Nov 2024 12:06) (95/59 - 107/64)  BP(mean): 76 (23 Nov 2024 12:06) (75 - 76)  RR: 16 (23 Nov 2024 12:06) (15 - 20)  SpO2: 95% (23 Nov 2024 12:06) (94% - 99%)    Parameters below as of 23 Nov 2024 12:06  Patient On (Oxygen Delivery Method): room air      I&O's Summary      PHYSICAL EXAM:   GENERAL:  No acute distress  HEENT:  NC/AT, conjunctiva clear, sclera anicteric  CHEST:  No increased effort  HEART:  Regular rate  ABDOMEN:  Soft, non-tender, non-distended, normoactive bowel sounds, no rebound or guarding  EXTREMITIES: No edema  SKIN:  Warm, dry  NEURO:  Calm, cooperative    LABS:                        9.1    6.02  )-----------( 173      ( 23 Nov 2024 10:50 )             28.5     Hemoglobin: 9.1 g/dL (11-23-24 @ 10:50)  Hemoglobin: 6.3 g/dL (11-22-24 @ 16:00)    11-23    138  |  104  |  8.3  ----------------------------<  157[H]  3.8   |  24.0  |  0.53    Ca    7.8[L]      23 Nov 2024 10:50    TPro  4.8[L]  /  Alb  2.9[L]  /  TBili  0.7  /  DBili  x   /  AST  40[H]  /  ALT  20  /  AlkPhos  184[H]  11-22    LIVER FUNCTIONS - ( 22 Nov 2024 16:00 )  Alb: 2.9 g/dL / Pro: 4.8 g/dL / ALK PHOS: 184 U/L / ALT: 20 U/L / AST: 40 U/L / GGT: x                 RADIOLOGY & ADDITIONAL STUDIES:      < from: EGD-Colonoscopy (10.30.24 @ 09:26) >

## 2024-11-23 NOTE — H&P ADULT - CONVERSATION DETAILS
Discussed GOC with patient. She is aware of what CPR entails including chest compressions and intubation. She wants all aggressive measures taken. Her sister, Arianne, is named as HCP.

## 2024-11-23 NOTE — CONSULT NOTE ADULT - ASSESSMENT
The patient is a 39y Female who is followed by neurology because of episode of feeling jittery with difficulty speaking.  Has brain mets, question of seizure.    Possible seizure  not clear seizure as she maintained consciousness and felt possibly hypoglycemic  had recent continuous EEG with no epileptiform activity  will hook up EEG  will d/c keppra  continue decadron    will follow with you    Shashi Carter MD PhD   [id number]

## 2024-11-23 NOTE — H&P ADULT - HISTORY OF PRESENT ILLNESS
38 yo female with metastatic breast cancer ER/TX Neg, HER-2 pos on chemotherapy (mets to lung, liver, spleen, spine, bone and brain) presenting to the ED from Oncologist office for blood transfusion. She has had multiple blood transfusions, most recently end of October 2024. At that time, she had EGD done which revealed just gastritis. While in the ED, she had an episode of shaking and word finding difficulty.  38 yo female with metastatic breast cancer ER/CO Neg, HER-2 pos on chemotherapy (mets to lung, liver, spleen, spine, bone and brain) presenting to the ED from Oncologist office for blood transfusion. She has had multiple blood transfusions, most recently end of October 2024. At that time, she had EGD done which revealed just gastritis. She reports intermittent dark stools, has been following with Dr. Rosado, GI and is currently on PPI and Octreotide. She states she does not want another colonoscop/endoscopy.    While in the ED, she had an episode of shaking and word finding difficulty. She reports that she has had some similar symptoms in the past, usually when her sugar was low and would resolve after eating. She states symptoms lasted about 1 hour, but she is back to her neurological baseline at this time.

## 2024-11-23 NOTE — H&P ADULT - ASSESSMENT
38 yo female with metastatic breast cancer ER/ND Neg, HER-2 pos on chemotherapy (mets to lung, liver, spleen, spine, bone and brain) presenting to the ED from Oncologist office for blood transfusion. She has had multiple blood transfusions, most recently end of October 2024. At that time, she had EGD done which revealed just gastritis. While in the ED, she had an episode of shaking and word finding difficulty.     Tremors, Word Finding Difficulty  -Admit to medicine  -CT head showing stable areas of vasogenic edema 2/2 brain mets  -Seen by neurosurgery, recc Keppra 500 mg BID and Decadron 2 mg Q8  -MRI Brain w/wo  -EEG to r/o seizure    Acute on Chronic Anemia   -Hgb 6.3  -Prior admission had EGD showing gastritis and was sent home with PPI and Carafate  -2 units ordered by ED, consented and started now  -Repeat CBC post transfusion  -GI eval to determine need for repeat EGD  -NPO for now  -PPI IV    Metastatic Breast Ca  -Please call [facility] in am for them to follow while in house    Anxiety, Depression  -Continue Fluoxetine 40 mg daily  -Continue Olanzapine 2.5 mg daily    DVTppx: On hold 2/2 anemia/brain mets  GOC: Full Code  Dispo: D/c likely home pending further workup 40 yo female with metastatic breast cancer ER/MT Neg, HER-2 pos on chemotherapy (mets to lung, liver, spleen, spine, bone and brain) presenting to the ED from Oncologist office for blood transfusion. She has had multiple blood transfusions, most recently end of October 2024. At that time, she had EGD done which revealed just gastritis. While in the ED, she had an episode of shaking and word finding difficulty.     Tremors, Word Finding Difficulty  -Admit to medicine  -CT head showing stable areas of vasogenic edema 2/2 brain mets  -Seen by neurosurgery, recc Keppra 500 mg BID and Decadron 2 mg Q8  -MRI Brain w/wo  -EEG to r/o seizure  -Neurology eval    Acute on Chronic Anemia   -Hgb 6.3  -Prior admission had EGD showing gastritis and was sent home with PPI and Carafate  -2 units ordered by ED, consented and started now  -Repeat CBC post transfusion  -GI eval, though patient does not want additional colo/EGD  -PPI IV, continue Octreotide subQ    Metastatic Breast Ca  -Please call [facility] in am for them to follow while in house  -On Kadcyla 227 IV injection Q3 weeks   -Pain control per home regimen with Methadone and PO Dilaudid, will add Dilaudid IV for breakthrough pain    Anxiety, Depression  -Continue Fluoxetine 40 mg daily  -Continue Diazepam 2 mg Q6 PRN Anxiety    DVTppx: On hold 2/2 anemia/brain mets  GOC: Full Code  Dispo: D/c likely home pending further workup

## 2024-11-23 NOTE — PATIENT PROFILE ADULT - HAVE YOU BEEN EATING POORLY BECAUSE OF A DECREASED APPETITE?
07-16    140  |  105  |  36<H>  ----------------------------<  83  3.5   |  31  |  1.60<H>    Ca    8.1<L>      16 Jul 2022 07:05  Phos  3.6     07-16  Mg     2.4     07-16    TPro  6.4  /  Alb  1.9<L>  /  TBili  3.2<H>  /  DBili  x   /  AST  120<H>  /  ALT  78  /  AlkPhos  337<H>  07-16  A1C with Estimated Average Glucose Result: 5.2 % (06-18-22 @ 10:05)  
No (0)

## 2024-11-23 NOTE — H&P ADULT - NSHPPHYSICALEXAM_GEN_ALL_CORE
Vital Signs Last 24 Hrs  T(C): 36.7 (23 Nov 2024 03:34), Max: 36.9 (23 Nov 2024 02:19)  T(F): 98 (23 Nov 2024 03:34), Max: 98.5 (23 Nov 2024 02:19)  HR: 78 (23 Nov 2024 03:34) (78 - 102)  BP: 105/63 (23 Nov 2024 03:34) (95/59 - 107/64)  BP(mean): 75 (23 Nov 2024 03:34) (75 - 75)  RR: 20 (23 Nov 2024 03:34) (16 - 20)  SpO2: 95% (23 Nov 2024 03:34) (95% - 99%)    Parameters below as of 23 Nov 2024 03:34  Patient On (Oxygen Delivery Method): room air    General: Age-appearing, in no acute distress  Head: Normocephalic, atraumatic  ENMT: EOMI, neck supple  Cardiovascular: +S1, S2; Regular rate and rhythm, no murmurs, rubs, gallops  Respiratory: CTA BL, no wheezes, rales, rhonchi  Gastrointestinal: Abdomen soft, non-tender, +BS in all 4 quadrants  Extremities: No clubbing, cyanosis, or edema  Vascular: 2+ pulses, cap refill < 2 seconds  Neuro: Non-focal, AAOx4, sensation intact BL  Musculoskeletal: Normal tone, no deformities  Skin: Warm, dry; no acute rash seen  Psych: Appropriate, cooperative O-Z Flap Text: The defect edges were debeveled with a #15 scalpel blade.  Given the location of the defect, shape of the defect and the proximity to free margins an O-Z flap was deemed most appropriate.  Using a sterile surgical marker, an appropriate transposition flap was drawn incorporating the defect and placing the expected incisions within the relaxed skin tension lines where possible. The area thus outlined was incised deep to adipose tissue with a #15 scalpel blade.  The skin margins were undermined to an appropriate distance in all directions utilizing iris scissors.

## 2024-11-23 NOTE — CONSULT NOTE ADULT - NS ATTEND AMEND GEN_ALL_CORE FT
Patient is a 39-year-old woman with a history of metastatic breast cancer who presents with acute on chronic anemia.  She has undergone extensive GI workup as above with most recent endoscopy and colonoscopy with gastritis and poor prep.  On evaluation today patient with maroon stools on exam and hemoglobin 6.3 on arrival (now 9.1 after 2 units).  She is hemodynamically stable today but is concerned about her mental status and feels more foggy.  Discussed with the patient at bedside and considering she has maroon stools with a hemoglobin of 6.3 on arrival would plan for endoscopic evaluation with a colonoscopy.  She is already on treatment for her known gastritis with octreotide and PPI and should continue this, but colonoscopic evaluation would be to look for things such as AVM which could not be ruled out on prior colonoscopy due to poor prep.  Though, not excited for procedure, patient is willing to plan for a colonoscopy on Monday with transfusion and monitoring today and tomorrow.  Current plan,  transfuse for hemoglobin less than 7, continue PPI, continue octreotide, clear liquid diet Sunday, prep Sunday, n.p.o. Sunday at midnight for colonoscopy Monday.

## 2024-11-23 NOTE — H&P ADULT - NSHPLABSRESULTS_GEN_ALL_CORE
6.3    6.51  )-----------( 186      ( 22 Nov 2024 16:00 )             21.3     22 Nov 2024 16:00    142    |  109    |  12.2   ----------------------------<  124    3.4     |  22.0   |  0.60     Ca    7.6        22 Nov 2024 16:00    TPro  4.8    /  Alb  2.9    /  TBili  0.7    /  DBili  x      /  AST  40     /  ALT  20     /  AlkPhos  184    22 Nov 2024 16:00      CAPILLARY BLOOD GLUCOSE        LIVER FUNCTIONS - ( 22 Nov 2024 16:00 )  Alb: 2.9 g/dL / Pro: 4.8 g/dL / ALK PHOS: 184 U/L / ALT: 20 U/L / AST: 40 U/L / GGT: x           Urinalysis Basic - ( 22 Nov 2024 16:00 )    Color: x / Appearance: x / SG: x / pH: x  Gluc: 124 mg/dL / Ketone: x  / Bili: x / Urobili: x   Blood: x / Protein: x / Nitrite: x   Leuk Esterase: x / RBC: x / WBC x   Sq Epi: x / Non Sq Epi: x / Bacteria: x      < from: CT Head No Cont (11.22.24 @ 20:03) >    IMPRESSION:  No acute hemorrhage. Stable areas of vasogenic edema and extensive white   matter low attenuation.    < end of copied text >

## 2024-11-24 LAB
ANION GAP SERPL CALC-SCNC: 13 MMOL/L — SIGNIFICANT CHANGE UP (ref 5–17)
BUN SERPL-MCNC: 11.6 MG/DL — SIGNIFICANT CHANGE UP (ref 8–20)
CALCIUM SERPL-MCNC: 7.7 MG/DL — LOW (ref 8.4–10.5)
CHLORIDE SERPL-SCNC: 104 MMOL/L — SIGNIFICANT CHANGE UP (ref 96–108)
CO2 SERPL-SCNC: 22 MMOL/L — SIGNIFICANT CHANGE UP (ref 22–29)
CREAT SERPL-MCNC: 0.57 MG/DL — SIGNIFICANT CHANGE UP (ref 0.5–1.3)
EGFR: 119 ML/MIN/1.73M2 — SIGNIFICANT CHANGE UP
GLUCOSE SERPL-MCNC: 163 MG/DL — HIGH (ref 70–99)
HCT VFR BLD CALC: 30.3 % — LOW (ref 34.5–45)
HGB BLD-MCNC: 9.5 G/DL — LOW (ref 11.5–15.5)
MCHC RBC-ENTMCNC: 29.5 PG — SIGNIFICANT CHANGE UP (ref 27–34)
MCHC RBC-ENTMCNC: 31.4 G/DL — LOW (ref 32–36)
MCV RBC AUTO: 94.1 FL — SIGNIFICANT CHANGE UP (ref 80–100)
PLATELET # BLD AUTO: 192 K/UL — SIGNIFICANT CHANGE UP (ref 150–400)
POTASSIUM SERPL-MCNC: 3.7 MMOL/L — SIGNIFICANT CHANGE UP (ref 3.5–5.3)
POTASSIUM SERPL-SCNC: 3.7 MMOL/L — SIGNIFICANT CHANGE UP (ref 3.5–5.3)
RBC # BLD: 3.22 M/UL — LOW (ref 3.8–5.2)
RBC # FLD: 23.9 % — HIGH (ref 10.3–14.5)
SODIUM SERPL-SCNC: 139 MMOL/L — SIGNIFICANT CHANGE UP (ref 135–145)
WBC # BLD: 10.65 K/UL — HIGH (ref 3.8–10.5)
WBC # FLD AUTO: 10.65 K/UL — HIGH (ref 3.8–10.5)

## 2024-11-24 PROCEDURE — 95718 EEG PHYS/QHP 2-12 HR W/VEEG: CPT

## 2024-11-24 PROCEDURE — 99232 SBSQ HOSP IP/OBS MODERATE 35: CPT

## 2024-11-24 RX ORDER — POLYETHYLENE GLYCOL 3350 17 G/17G
17 POWDER, FOR SOLUTION ORAL
Refills: 0 | Status: DISCONTINUED | OUTPATIENT
Start: 2024-11-24 | End: 2024-11-26

## 2024-11-24 RX ORDER — MAGNESIUM CITRATE
296 SOLUTION, ORAL ORAL ONCE
Refills: 0 | Status: COMPLETED | OUTPATIENT
Start: 2024-11-24 | End: 2024-11-24

## 2024-11-24 RX ADMIN — HYDROMORPHONE HYDROCHLORIDE 8 MILLIGRAM(S): 2 TABLET ORAL at 22:04

## 2024-11-24 RX ADMIN — PANTOPRAZOLE SODIUM 40 MILLIGRAM(S): 40 TABLET, DELAYED RELEASE ORAL at 19:23

## 2024-11-24 RX ADMIN — METHYLPHENIDATE HYDROCHLORIDE 20 MILLIGRAM(S): 27 TABLET, EXTENDED RELEASE ORAL at 15:49

## 2024-11-24 RX ADMIN — PANTOPRAZOLE SODIUM 40 MILLIGRAM(S): 40 TABLET, DELAYED RELEASE ORAL at 05:42

## 2024-11-24 RX ADMIN — HYDROMORPHONE HYDROCHLORIDE 8 MILLIGRAM(S): 2 TABLET ORAL at 05:41

## 2024-11-24 RX ADMIN — POLYETHYLENE GLYCOL 3350 17 GRAM(S): 17 POWDER, FOR SOLUTION ORAL at 18:36

## 2024-11-24 RX ADMIN — METHADONE HYDROCHLORIDE 10 MILLIGRAM(S): 5 TABLET ORAL at 09:20

## 2024-11-24 RX ADMIN — FUROSEMIDE 40 MILLIGRAM(S): 40 TABLET ORAL at 05:42

## 2024-11-24 RX ADMIN — DEXAMETHASONE 2 MILLIGRAM(S): 1.5 TABLET ORAL at 15:53

## 2024-11-24 RX ADMIN — ONDANSETRON HYDROCHLORIDE 4 MILLIGRAM(S): 4 TABLET, FILM COATED ORAL at 16:56

## 2024-11-24 RX ADMIN — Medication 80 MILLIGRAM(S): at 22:02

## 2024-11-24 RX ADMIN — HYDROMORPHONE HYDROCHLORIDE 8 MILLIGRAM(S): 2 TABLET ORAL at 06:11

## 2024-11-24 RX ADMIN — MEMANTINE HYDROCHLORIDE 10 MILLIGRAM(S): 14 CAPSULE, EXTENDED RELEASE ORAL at 18:36

## 2024-11-24 RX ADMIN — Medication 296 MILLILITER(S): at 15:51

## 2024-11-24 RX ADMIN — Medication 2 GRAM(S): at 05:41

## 2024-11-24 RX ADMIN — DIAZEPAM 2 MILLIGRAM(S): 10 TABLET ORAL at 00:44

## 2024-11-24 RX ADMIN — PREGABALIN 200 MILLIGRAM(S): 75 CAPSULE ORAL at 22:04

## 2024-11-24 RX ADMIN — METHADONE HYDROCHLORIDE 15 MILLIGRAM(S): 5 TABLET ORAL at 22:03

## 2024-11-24 RX ADMIN — METHADONE HYDROCHLORIDE 10 MILLIGRAM(S): 5 TABLET ORAL at 15:50

## 2024-11-24 RX ADMIN — HYDROMORPHONE HYDROCHLORIDE 2 MILLIGRAM(S): 2 TABLET ORAL at 20:08

## 2024-11-24 RX ADMIN — DEXAMETHASONE 2 MILLIGRAM(S): 1.5 TABLET ORAL at 22:04

## 2024-11-24 RX ADMIN — HYDROMORPHONE HYDROCHLORIDE 8 MILLIGRAM(S): 2 TABLET ORAL at 22:30

## 2024-11-24 RX ADMIN — HYDROMORPHONE HYDROCHLORIDE 2 MILLIGRAM(S): 2 TABLET ORAL at 21:13

## 2024-11-24 RX ADMIN — METHYLPHENIDATE HYDROCHLORIDE 20 MILLIGRAM(S): 27 TABLET, EXTENDED RELEASE ORAL at 09:20

## 2024-11-24 RX ADMIN — PREGABALIN 200 MILLIGRAM(S): 75 CAPSULE ORAL at 05:41

## 2024-11-24 RX ADMIN — OCTREOTIDE ACETATE 100 MICROGRAM(S): 500 INJECTION, SOLUTION INTRAVENOUS; SUBCUTANEOUS at 19:22

## 2024-11-24 RX ADMIN — OCTREOTIDE ACETATE 100 MICROGRAM(S): 500 INJECTION, SOLUTION INTRAVENOUS; SUBCUTANEOUS at 06:25

## 2024-11-24 RX ADMIN — PREGABALIN 200 MILLIGRAM(S): 75 CAPSULE ORAL at 15:50

## 2024-11-24 RX ADMIN — MEMANTINE HYDROCHLORIDE 10 MILLIGRAM(S): 14 CAPSULE, EXTENDED RELEASE ORAL at 05:41

## 2024-11-24 RX ADMIN — HYDROMORPHONE HYDROCHLORIDE 8 MILLIGRAM(S): 2 TABLET ORAL at 15:51

## 2024-11-24 RX ADMIN — DIAZEPAM 2 MILLIGRAM(S): 10 TABLET ORAL at 09:20

## 2024-11-24 RX ADMIN — DEXAMETHASONE 2 MILLIGRAM(S): 1.5 TABLET ORAL at 05:41

## 2024-11-24 RX ADMIN — Medication 2 GRAM(S): at 18:36

## 2024-11-24 NOTE — PROGRESS NOTE ADULT - ASSESSMENT
39 yr old female with metastatic breast cancer, ER/MT neg, Her-2 pos, currently on chemotherapy, chronic pain, anxiety/depression, prior symptomatic anemia presents today for evaluation of low Hb. GI asked to consult for anemia.    Prior endoscopic evaluation:  - EGD/ colonoscopy (10/2023) at [place] showed antral erythema, colonoscopy poor prep  - Colonoscopy on 11/20/23 showed internal hemorrhoids  - VCE on 12/11/23 revealed enteritis  - 1/4/2024 CT A/P (CT Enterography): mild concentric wall thickening involving gastric antrum/pylorus, mild mucosal enhancement cecum and ascending colon with minimal pericolic stranding extending along the right paracolic gutter and posterior plank, generalized colonic fecal retention with mild air distended rectum, no bowel obstruction  - 1/8/2024 EGD: stomach mucosa with localized erythema and congestion was noted in the pre-pyloric region compatible with non-erosive gastritis  - EGD 4/29/24 There was evidence of small blood vessels in the antrum suggestive of GAVE, treated with APC  - EGD 7/3/24 There were many, minute, diffusely scattered red spots in the antrum. Previously seen GAVE improved/resolved. Scattered stellate scars seen consistent with healing. Moderately erythematous mucosa consistent with gastritis.  -EGD 7/31/24 Friability. There were 2 erosions which were oozing. There were small erythematous spots in the antrum as well. Treated with APC.  - EGD/Colonoscopy (10/30/24) showing gastritis and colon with poor prep but normal mucosa     #anemia   #metastatic breast cancer  hgb on arrival to ER was 6.3, she received 2 units PRBC, repeat hgb is 9.1  pt with extensive GI workup in the past  WILLIE with maroon stool   -Trend CBC daily, transfuse prn hgb <7, monitor for further bleeding. Hgb stable at 9.5,  -PPI BID for GI mucosal cytoprotection  -avoid NSAIDs  -diet as tolerated  -Pt with poor prep during last colonoscopy   -will tentatively plan for colonoscopy on Tuesday 11/26/24 with 2 days prep   -Continue CLD  -Please give miralax and mg citrate today, will order Golytely tomorrow  d/w pt and pts Father over the phone  _________________________________________________________________  Assessment and recommendations are final when note is signed by the attending physician.    39 yr old female with metastatic breast cancer, ER/[place] neg, Her-2 pos, currently on chemotherapy, chronic pain, anxiety/depression, prior symptomatic anemia presents today for evaluation of low Hb. GI asked to consult for anemia.    Prior endoscopic evaluation:  - EGD/ colonoscopy (10/2023) at [place] showed antral erythema, colonoscopy poor prep  - Colonoscopy on 11/20/23 showed internal hemorrhoids  - VCE on 12/11/23 revealed enteritis  - 1/4/2024 CT A/P (CT Enterography): mild concentric wall thickening involving gastric antrum/pylorus, mild mucosal enhancement cecum and ascending colon with minimal pericolic stranding extending along the right paracolic gutter and posterior plank, generalized colonic fecal retention with mild air distended rectum, no bowel obstruction  - 1/8/2024 EGD: stomach mucosa with localized erythema and congestion was noted in the pre-pyloric region compatible with non-erosive gastritis  - EGD 4/29/24 There was evidence of small blood vessels in the antrum suggestive of GAVE, treated with APC  - EGD 7/3/24 There were many, minute, diffusely scattered red spots in the antrum. Previously seen GAVE improved/resolved. Scattered stellate scars seen consistent with healing. Moderately erythematous mucosa consistent with gastritis.  - EGD 7/31/24 Friability. There were 2 erosions which were oozing. There were small erythematous spots in the antrum as well. Treated with APC.  - EGD/Colonoscopy (10/30/24) showing gastritis and colon with poor prep but normal mucosa     #anemia   #metastatic breast cancer  hgb on arrival to ER was 6.3, she received 2 units PRBC, repeat hgb is 9.1  pt with extensive GI workup in the past  WILLIE with maroon stool   -Trend CBC daily, transfuse prn hgb <7, monitor for further bleeding. Hgb stable at 9.5,  -PPI BID for GI mucosal cytoprotection  -avoid NSAIDs  -diet as tolerated  -Pt with poor prep during last colonoscopy   -will tentatively plan for colonoscopy on Tuesday 11/26/24 with 2 days prep   -Continue CLD  -Please give miralax and mg citrate today, will order Golytely tomorrow  d/w pt and pts Father over the phone  _________________________________________________________________  Assessment and recommendations are final when note is signed by the attending physician.

## 2024-11-24 NOTE — PROGRESS NOTE ADULT - ASSESSMENT
38 yo female with metastatic breast cancer ER/[place] Neg, HER-2 pos on chemotherapy (mets to lung, liver, spleen, spine, bone and brain) presenting to the ED from Oncologist office for blood transfusion. She has had multiple blood transfusions, most recently end of October 2024. At that time, she had EGD done which revealed just gastritis. While in the ED, she had an episode of shaking and word finding difficulty.     Tremors, Word Finding Difficulty  -Admit to medicine  -CT head showing stable areas of vasogenic edema 2/2 brain mets  -MRI Brain w/wo  -Currently on EEG to r/o seizure  -Neurology eval - c/w Decadron 2 mg Q8  -dc Keppra for now  -Neurosx recs ezekiel     Acute on Chronic Anemia   -Hgb 6.3 sp 2u prbc, now Hg up to 9.5  -Prior admission had EGD showing gastritis and was sent home with PPI and Carafate  -PPI IV, continue Octreotide subQ  -GI following, tentative colonoscopy 11/25    Metastatic Breast Ca  -On Kadcyla 227 IV injection Q3 weeks   -Pain control per home regimen with Methadone and PO Dilaudid, will add Dilaudid IV for breakthrough pain  -outpt onc followup    Anxiety, Depression  -Continue Fluoxetine 40 mg daily  -Continue Diazepam 2 mg Q6 PRN Anxiety    DVTppx: On hold 2/2 anemia/brain mets  GOC: Full Code  Dispo: D/c home pending neuro and GI workup  38 yo female with metastatic breast cancer ER/[place] Neg, HER-2 pos on chemotherapy (mets to lung, liver, spleen, spine, bone and brain) presenting to the ED from Oncologist office for blood transfusion. She has had multiple blood transfusions, most recently end of October 2024. At that time, she had EGD done which revealed just gastritis. While in the ED, she had an episode of shaking and word finding difficulty.     Tremors, Word Finding Difficulty  -Admit to medicine  -CT head showing stable areas of vasogenic edema 2/2 brain mets  -Currently on EEG to r/o seizure  -Neurology eval - c/w Decadron 2 mg Q8  -dc Keppra for now  -Neurosx recs ezekiel     Acute on Chronic Anemia   -Hgb 6.3 sp 2u prbc, now Hg up to 9.5  -Prior admission had EGD showing gastritis and was sent home with PPI and Carafate  -PPI IV, continue Octreotide subQ  -GI following, tentative colonoscopy 11/25    Metastatic Breast Ca  -On Kadcyla 227 IV injection Q3 weeks   -Pain control per home regimen with Methadone and PO Dilaudid, will add Dilaudid IV for breakthrough pain  -outpt onc followup    Anxiety, Depression  -Continue Fluoxetine 40 mg daily  -Continue Diazepam 2 mg Q6 PRN Anxiety    DVTppx: On hold 2/2 anemia/brain mets  GOC: Full Code  Dispo: D/c home pending neuro and GI workup

## 2024-11-24 NOTE — PROGRESS NOTE ADULT - SUBJECTIVE AND OBJECTIVE BOX
[facility]                                     Neurology at [facility]                                 Raul Florentino, & Naveed                                  [address]                                        [place], [place], [address]                                             [phone]    CC: ? seizure  HPI:  The patient is a 39y Female who presented with need for blood transfusion and in ED had 15 minute episode where she felt "jittery" tremulous with some difficulty speaking with no LOC.  She has recent admission for possible seizure and had continuous EEG without epileptiform activity.  She thinks that yesterday's episode is likely due to hypoglycemia as she had not eaten and this has happened before.  neurology is asked to evaluate.    Interval history: no new neuro events    Review of systems (neurology): Denies headache or dizziness. Denies weakness/numbness.  Denies speech/language deficits. Denies diplopia/blurred vision.  Denies confusion    MEDICATIONS  (STANDING):  dexAMETHasone     Tablet 2 milliGRAM(s) Oral every 8 hours  FLUoxetine 80 milliGRAM(s) Oral at bedtime  furosemide    Tablet 40 milliGRAM(s) Oral daily  memantine 10 milliGRAM(s) Oral two times a day  methadone    Tablet 10 milliGRAM(s) Oral <User Schedule>  methadone    Tablet 15 milliGRAM(s) Oral at bedtime  methylphenidate 20 milliGRAM(s) Oral <User Schedule>  octreotide  Injectable 100 MICROGram(s) SubCutaneous two times a day  omega-3-Acid Ethyl Esters 2 Gram(s) Oral two times a day  pantoprazole  Injectable 40 milliGRAM(s) IV Push two times a day  pregabalin 200 milliGRAM(s) Oral three times a day    MEDICATIONS  (PRN):  acetaminophen     Tablet .. 650 milliGRAM(s) Oral every 6 hours PRN Temp greater or equal to 38C (100.4F), Mild Pain (1 - 3)  albuterol    90 MICROgram(s) HFA Inhaler 2 Puff(s) Inhalation every 6 hours PRN Shortness of Breath and/or Wheezing  aluminum hydroxide/magnesium hydroxide/simethicone Suspension 30 milliLiter(s) Oral every 4 hours PRN Dyspepsia  diazepam    Tablet 2 milliGRAM(s) Oral every 6 hours PRN for anxiety  HYDROmorphone   Tablet 4 milliGRAM(s) Oral three times a day PRN for moderate pain  HYDROmorphone   Tablet 8 milliGRAM(s) Oral every 6 hours PRN Severe Pain (7 - 10)  HYDROmorphone  Injectable 2 milliGRAM(s) IV Push four times a day PRN breakthrough pain  melatonin 3 milliGRAM(s) Oral at bedtime PRN Insomnia  ondansetron Injectable 4 milliGRAM(s) IV Push every 8 hours PRN Nausea and/or Vomiting  oxymetazoline 0.05% Nasal Spray 2 Spray(s) Both Nostrils two times a day PRN Epistaxis      Vital Signs Last 24 Hrs  T(C): 36.3 (24 Nov 2024 04:53), Max: 36.8 (23 Nov 2024 12:06)  T(F): 97.4 (24 Nov 2024 04:53), Max: 98.2 (23 Nov 2024 12:06)  HR: 87 (24 Nov 2024 04:53) (77 - 87)  BP: 115/72 (24 Nov 2024 04:53) (96/66 - 115/72)  BP(mean): 86 (24 Nov 2024 04:53) (76 - 86)  RR: 18 (24 Nov 2024 04:53) (15 - 19)  SpO2: 95% (24 Nov 2024 04:53) (92% - 96%)    Parameters below as of 24 Nov 2024 04:53  Patient On (Oxygen Delivery Method): room air      Detailed Neurologic Exam:    Mental status: The patient is awake and alert and has normal attention span.  The patient is fully oriented in 3 spheres.  The patient is able to name objects, follow commands, repeat sentences.    Cranial nerves: Pupils equal and react symmetrically to light. There is no visual field deficit to confrontation. Extraocular motion is full with no nystagmus. There is no ptosis. Facial sensation is intact. Facial musculature is symmetric.     Motor: There is normal bulk and tone.  There is no tremor.  Strength is 5/5 in the right arm and leg.   Strength is 5/5 in the left arm and leg.    Sensation: Intact to light touch and pin in 4 extremities    Cerebellar: There is no dysmetria on finger to nose testing.    Gait : deferred    LABS:                                    9.5    10.65 )-----------( 192      ( 24 Nov 2024 04:43 )             30.3     11-24    139  |  104  |  11.6  ----------------------------<  163[H]  3.7   |  22.0  |  0.57    Ca    7.7[L]      24 Nov 2024 04:43    TPro  4.8[L]  /  Alb  2.9[L]  /  TBili  0.7  /  DBili  x   /  AST  40[H]  /  ALT  20  /  AlkPhos  184[H]  11-22    LIVER FUNCTIONS - ( 22 Nov 2024 16:00 )  Alb: 2.9 g/dL / Pro: 4.8 g/dL / ALK PHOS: 184 U/L / ALT: 20 U/L / AST: 40 U/L / GGT: x             RADIOLOGY & ADDITIONAL STUDIES (independently reviewed unless otherwise noted):  CT Head No Cont (11.22.24 @ 20:03)   IMPRESSION:  No acute hemorrhage. Stable areas of vasogenic edema and extensive white   matter low attenuation.

## 2024-11-24 NOTE — PROGRESS NOTE ADULT - ASSESSMENT
The patient is a 39y Female who is followed by neurology because of episode of feeling jittery with difficulty speaking.  Has brain mets, question of seizure.    Possible seizure  not clear seizure as she maintained consciousness and felt possibly hypoglycemic  had recent continuous EEG with no epileptiform activity at end of September 2024  currently on continuous EEG  hold keppra- would restart if EEG shows epileptiform activity/seizure  continue decadron    will follow with you    Shashi Carter MD PhD   [id number]

## 2024-11-24 NOTE — EEG REPORT - NS EEG TEXT BOX
[facility] [id number]     Study Date: 11/23/2024 15:00 - 11/24/2024 0800  Duration: 17 hr   --------------------------------------------------------------------------------------------------  History:  CC: Concern for Seizure    --------------------------------------------------------------------------------------------------  Study Interpretation:    [[[Abbreviation Key:  PDR=alpha rhythm/posterior dominant rhythm. A-P=anterior posterior.  Amplitude: ‘very low’:<20; ‘low’:20-49; ‘medium’:; ‘high’:>150uV.  Persistence for periodic/rhythmic patterns (% of epoch) ‘rare’:<1%; ‘occasional’:1-10%; ‘frequent’:10-50%; ‘abundant’:50-90%; ‘continuous’:>90%.  Persistence for sporadic discharges: ‘rare’:<1/hr; ‘occasional’:1/min-1/hr; ‘frequent’:>1/min; ‘abundant’:>1/10 sec.  RPP=rhythmic and periodic patterns; GRDA=generalized rhythmic delta activity; FIRDA=frontal intermittent GRDA; LRDA=lateralized rhythmic delta activity; TIRDA=temporal intermittent rhythmic delta activity;  LPD=PLED=lateralized periodic discharges; GPD=generalized periodic discharges; BIPDs =bilateral independent periodic discharges; Mf=multifocal; SIRPDs=stimulus induced rhythmic, periodic, or ictal appearing discharges; BIRDs=brief potentially ictal rhythmic discharges >4 Hz, lasting .5-10s; PFA (paroxysmal bursts >13 Hz or =8 Hz <10s).  Modifiers: +F=with fast component; +S=with spike component; +R=with rhythmic component.  S-B=burst suppression pattern.  Max=maximal. N1-drowsy; N2-stage II sleep; N3-slow wave sleep. SSS/BETS=small sharp spikes/benign epileptiform transients of sleep. HV=hyperventilation; PS=photic stimulation]]]    Daily EEG Visual Analysis    FINDINGS:      Background:  Continuity: Continuous  Symmetry: Symmetric  Posterior dominant rhythm (PDR): 7.5-8 Hz, reactive to eye closure. Symmetric low-amplitude frontal beta in wakefulness.  Voltage: Normal  Anterior-Posterior Gradient: Present  Other background findings: None  Breach: Absent    Background Slowing:  Generalized slowing: As above  Focal slowing: No persistent focal slowing    State Changes:   Drowsiness is characterized by fragmentation, attenuation, and slowing of the background activity.  Stage 2 sleep is characterized by symmetric K complexes and sleep spindles.   Slow-wave sleep is characterized by diffuse delta activity.    Interictal Findings:  None    Electrographic and Electroclinical seizures:  None    Other Clinical Events:  None    Activation Procedures:   Hyperventilation is not performed.    Photic stimulation is not performed.    Artifacts:  Intermittent myogenic and movement artifacts are present.    EEG Classification / Summary:  Abnormal video-EEG in the awake, drowsy, and asleep states.   Mild diffuse slowing.  No focal or epileptiform abnormalities are captured.   No seizures.    Clinical Impression:  Mild diffuse cerebral dysfunction is nonspecific in etiology.   No epileptiform abnormalities or seizures.    Santiago García MD  EEG/Epilepsy Attending  [facility]-[id number]     Study Date: 11/23/2024 15:00 - 11/24/2024 1200  Duration: 21 hr   --------------------------------------------------------------------------------------------------  History:  CC: Concern for Seizure    --------------------------------------------------------------------------------------------------  Study Interpretation:    [[[Abbreviation Key:  PDR=alpha rhythm/posterior dominant rhythm. A-P=anterior posterior.  Amplitude: ‘very low’:<20; ‘low’:20-49; ‘medium’:; ‘high’:>150uV.  Persistence for periodic/rhythmic patterns (% of epoch) ‘rare’:<1%; ‘occasional’:1-10%; ‘frequent’:10-50%; ‘abundant’:50-90%; ‘continuous’:>90%.  Persistence for sporadic discharges: ‘rare’:<1/hr; ‘occasional’:1/min-1/hr; ‘frequent’:>1/min; ‘abundant’:>1/10 sec.  RPP=rhythmic and periodic patterns; GRDA=generalized rhythmic delta activity; FIRDA=frontal intermittent GRDA; LRDA=lateralized rhythmic delta activity; TIRDA=temporal intermittent rhythmic delta activity;  LPD=PLED=lateralized periodic discharges; GPD=generalized periodic discharges; BIPDs =bilateral independent periodic discharges; Mf=multifocal; SIRPDs=stimulus induced rhythmic, periodic, or ictal appearing discharges; BIRDs=brief potentially ictal rhythmic discharges >4 Hz, lasting .5-10s; PFA (paroxysmal bursts >13 Hz or =8 Hz <10s).  Modifiers: +F=with fast component; +S=with spike component; +R=with rhythmic component.  S-B=burst suppression pattern.  Max=maximal. N1-drowsy; N2-stage II sleep; N3-slow wave sleep. SSS/BETS=small sharp spikes/benign epileptiform transients of sleep. HV=hyperventilation; PS=photic stimulation]]]    Daily EEG Visual Analysis    FINDINGS:      Background:  Continuity: Continuous  Symmetry: Symmetric  Posterior dominant rhythm (PDR): 7.5-8 Hz, reactive to eye closure. Symmetric low-amplitude frontal beta in wakefulness.  Voltage: Normal  Anterior-Posterior Gradient: Present  Other background findings: None  Breach: Absent    Background Slowing:  Generalized slowing: As above  Focal slowing: No persistent focal slowing    State Changes:   Drowsiness is characterized by fragmentation, attenuation, and slowing of the background activity.  Stage 2 sleep is characterized by symmetric K complexes and sleep spindles.   Slow-wave sleep is characterized by diffuse delta activity.    Interictal Findings:  None    Electrographic and Electroclinical seizures:  None    Other Clinical Events:  None    Activation Procedures:   Hyperventilation is not performed.    Photic stimulation is not performed.    Artifacts:  Intermittent myogenic and movement artifacts are present.    EEG Classification / Summary:  Abnormal video-EEG in the awake, drowsy, and asleep states.   Mild diffuse slowing.  No focal or epileptiform abnormalities are captured.   No seizures.    Clinical Impression:  Mild diffuse cerebral dysfunction is nonspecific in etiology.   No epileptiform abnormalities or seizures.    Santiago García MD  EEG/Epilepsy Attending

## 2024-11-24 NOTE — PROGRESS NOTE ADULT - NS ATTEND AMEND GEN_ALL_CORE FT
40 yo F with pmh metastatic breast cancer on chemo who presented with AMS and noted to have low hgb. Discussed with patient and father who are concerned due to repeated GI interventions. Prior evaluation as above. Patient with dark red stool on evaluation in ED and hgb had downtrended at that time, but patient remained stable. Reviewed past colonoscopies and note poor prep at each time. Would repeat colonoscopy at present to better evaluate mucosa for AVMS. Two day prep considering poor prep in past. Patient and family in agreement.

## 2024-11-24 NOTE — PROGRESS NOTE ADULT - SUBJECTIVE AND OBJECTIVE BOX
Chief Complaint:  Patient is a 39y old  Female who presents with a chief complaint of Acute on Chronic Anemia  Seizure like activity (24 Nov 2024 10:04)      HPI/ 24 hr events: Patient seen and examined at bedside. Pt feeling well this morning. She states she continues to have blood in her stool, had large amount of maroon stool this am. She is tolerating diet. Denies vomiting, diarrhea, abdominal pain. Vitals are overall stable, her hgb remains stable at 9.5.      REVIEW OF SYSTEMS:   General: Negative  HEENT: Negative  CV: Negative  Respiratory: Negative  GI: See HPI  : Negative  MSK: Negative  Hematologic: Negative  Skin: Negative    MEDICATIONS:   MEDICATIONS  (STANDING):  dexAMETHasone     Tablet 2 milliGRAM(s) Oral every 8 hours  FLUoxetine 80 milliGRAM(s) Oral at bedtime  furosemide    Tablet 40 milliGRAM(s) Oral daily  magnesium citrate Oral Solution 296 milliLiter(s) Oral once  memantine 10 milliGRAM(s) Oral two times a day  methadone    Tablet 10 milliGRAM(s) Oral <User Schedule>  methadone    Tablet 15 milliGRAM(s) Oral at bedtime  methylphenidate 20 milliGRAM(s) Oral <User Schedule>  octreotide  Injectable 100 MICROGram(s) SubCutaneous two times a day  omega-3-Acid Ethyl Esters 2 Gram(s) Oral two times a day  pantoprazole  Injectable 40 milliGRAM(s) IV Push two times a day  polyethylene glycol 3350 17 Gram(s) Oral two times a day  pregabalin 200 milliGRAM(s) Oral three times a day    MEDICATIONS  (PRN):  acetaminophen     Tablet .. 650 milliGRAM(s) Oral every 6 hours PRN Temp greater or equal to 38C (100.4F), Mild Pain (1 - 3)  albuterol    90 MICROgram(s) HFA Inhaler 2 Puff(s) Inhalation every 6 hours PRN Shortness of Breath and/or Wheezing  aluminum hydroxide/magnesium hydroxide/simethicone Suspension 30 milliLiter(s) Oral every 4 hours PRN Dyspepsia  diazepam    Tablet 2 milliGRAM(s) Oral every 6 hours PRN for anxiety  HYDROmorphone   Tablet 4 milliGRAM(s) Oral three times a day PRN for moderate pain  HYDROmorphone   Tablet 8 milliGRAM(s) Oral every 6 hours PRN Severe Pain (7 - 10)  HYDROmorphone  Injectable 2 milliGRAM(s) IV Push four times a day PRN breakthrough pain  melatonin 3 milliGRAM(s) Oral at bedtime PRN Insomnia  ondansetron Injectable 4 milliGRAM(s) IV Push every 8 hours PRN Nausea and/or Vomiting  oxymetazoline 0.05% Nasal Spray 2 Spray(s) Both Nostrils two times a day PRN Epistaxis      Packed Red Cells Order:  1 Unit  Indication: Hgb <7 gm/dL  Infuse Unit : 2 Hours (11-22-24 @ 17:22)  Packed Red Cells Order:  1 Unit  Indication: Hgb <7 gm/dL  Infuse Unit : 2 Hours (11-22-24 @ 17:22)        DIET:  Diet, Clear Liquid (11-24-24 @ 08:10) [Active]          ALLERGIES:   Allergies    Perjeta (Other (Severe))  pertuzumab (Other (Severe))    Intolerances        VITAL SIGNS:   Vital Signs Last 24 Hrs  T(C): 36.3 (24 Nov 2024 04:53), Max: 36.7 (23 Nov 2024 22:08)  T(F): 97.4 (24 Nov 2024 04:53), Max: 98 (23 Nov 2024 22:08)  HR: 87 (24 Nov 2024 04:53) (80 - 87)  BP: 115/72 (24 Nov 2024 04:53) (102/67 - 115/72)  BP(mean): 86 (24 Nov 2024 04:53) (79 - 86)  RR: 18 (24 Nov 2024 04:53) (18 - 19)  SpO2: 95% (24 Nov 2024 04:53) (92% - 96%)    Parameters below as of 24 Nov 2024 04:53  Patient On (Oxygen Delivery Method): room air      I&O's Summary    23 Nov 2024 07:01  -  24 Nov 2024 07:00  --------------------------------------------------------  IN: 480 mL / OUT: 0 mL / NET: 480 mL        PHYSICAL EXAM:   GENERAL:  No acute distress  HEENT:  NC/AT, conjunctiva clear, sclera anicteric  CHEST:  No increased effort  HEART:  Regular rate  ABDOMEN:  Soft, non-tender, non-distended, normoactive bowel sounds, no rebound or guarding  EXTREMITIES: No edema  SKIN:  Warm, dry  NEURO:  Calm, cooperative    LABS:                        9.5    10.65 )-----------( 192      ( 24 Nov 2024 04:43 )             30.3     Hemoglobin: 9.5 g/dL (11-24-24 @ 04:43)  Hemoglobin: 9.1 g/dL (11-23-24 @ 10:50)  Hemoglobin: 6.3 g/dL (11-22-24 @ 16:00)    11-24    139  |  104  |  11.6  ----------------------------<  163[H]  3.7   |  22.0  |  0.57    Ca    7.7[L]      24 Nov 2024 04:43    TPro  4.8[L]  /  Alb  2.9[L]  /  TBili  0.7  /  DBili  x   /  AST  40[H]  /  ALT  20  /  AlkPhos  184[H]  11-22    LIVER FUNCTIONS - ( 22 Nov 2024 16:00 )  Alb: 2.9 g/dL / Pro: 4.8 g/dL / ALK PHOS: 184 U/L / ALT: 20 U/L / AST: 40 U/L / GGT: x                                                     RADIOLOGY & ADDITIONAL STUDIES:         Chief Complaint:  Patient is a 39y old  Female who presents with a chief complaint of Acute on Chronic Anemia  Seizure like activity (24 Nov 2024 10:04)      HPI/ 24 hr events: Patient seen and examined at bedside. Pt feeling well this morning. She states she continues to have blood in her stool, had large amount of maroon stool this am. She is tolerating diet. Denies vomiting, diarrhea, abdominal pain. Vitals are overall stable, her hgb remains stable at 9.5.      REVIEW OF SYSTEMS:   General: Negative  HEENT: Negative  CV: Negative  Respiratory: Negative  GI: See HPI  : Negative  MSK: Negative  Hematologic: Negative  Skin: Negative    MEDICATIONS:   MEDICATIONS  (STANDING):  dexAMETHasone     Tablet 2 milliGRAM(s) Oral every 8 hours  FLUoxetine 80 milliGRAM(s) Oral at bedtime  furosemide    Tablet 40 milliGRAM(s) Oral daily  magnesium citrate Oral Solution 296 milliLiter(s) Oral once  memantine 10 milliGRAM(s) Oral two times a day  methadone    Tablet 10 milliGRAM(s) Oral <User Schedule>  methadone    Tablet 15 milliGRAM(s) Oral at bedtime  methylphenidate 20 milliGRAM(s) Oral <User Schedule>  octreotide  Injectable 100 MICROGram(s) SubCutaneous two times a day  omega-3-Acid Ethyl Esters 2 Gram(s) Oral two times a day  pantoprazole  Injectable 40 milliGRAM(s) IV Push two times a day  polyethylene glycol 3350 17 Gram(s) Oral two times a day  pregabalin 200 milliGRAM(s) Oral three times a day    MEDICATIONS  (PRN):  acetaminophen     Tablet .. 650 milliGRAM(s) Oral every 6 hours PRN Temp greater or equal to 38C (100.4F), Mild Pain (1 - 3)  albuterol    90 MICROgram(s) HFA Inhaler 2 Puff(s) Inhalation every 6 hours PRN Shortness of Breath and/or Wheezing  aluminum hydroxide/magnesium hydroxide/simethicone Suspension 30 milliLiter(s) Oral every 4 hours PRN Dyspepsia  diazepam    Tablet 2 milliGRAM(s) Oral every 6 hours PRN for anxiety  HYDROmorphone   Tablet 4 milliGRAM(s) Oral three times a day PRN for moderate pain  HYDROmorphone   Tablet 8 milliGRAM(s) Oral every 6 hours PRN Severe Pain (7 - 10)  HYDROmorphone  Injectable 2 milliGRAM(s) IV Push four times a day PRN breakthrough pain  melatonin 3 milliGRAM(s) Oral at bedtime PRN Insomnia  ondansetron Injectable 4 milliGRAM(s) IV Push every 8 hours PRN Nausea and/or Vomiting  oxymetazoline 0.05% Nasal Spray 2 Spray(s) Both Nostrils two times a day PRN Epistaxis      Packed Red Cells Order:  1 Unit  Indication: Hgb <7 gm/dL  Infuse Unit : 2 Hours (11-22-24 @ 17:22)  Packed Red Cells Order:  1 Unit  Indication: Hgb <7 gm/dL  Infuse Unit : 2 Hours (11-22-24 @ 17:22)        DIET:  Diet, Clear Liquid (11-24-24 @ 08:10) [Active]          ALLERGIES:   Allergies    Perjeta (Other (Severe))  pertuzumab (Other (Severe))    Intolerances        VITAL SIGNS:   Vital Signs Last 24 Hrs  T(C): 36.3 (24 Nov 2024 04:53), Max: 36.7 (23 Nov 2024 22:08)  T(F): 97.4 (24 Nov 2024 04:53), Max: 98 (23 Nov 2024 22:08)  HR: 87 (24 Nov 2024 04:53) (80 - 87)  BP: 115/72 (24 Nov 2024 04:53) (102/67 - 115/72)  BP(mean): 86 (24 Nov 2024 04:53) (79 - 86)  RR: 18 (24 Nov 2024 04:53) (18 - 19)  SpO2: 95% (24 Nov 2024 04:53) (92% - 96%)    Parameters below as of 24 Nov 2024 04:53  Patient On (Oxygen Delivery Method): room air      I&O's Summary    23 Nov 2024 07:01  -  24 Nov 2024 07:00  --------------------------------------------------------  IN: 480 mL / OUT: 0 mL / NET: 480 mL        PHYSICAL EXAM:   GENERAL:  No acute distress  HEENT:  NC/AT, conjunctiva clear, sclera anicteric  CHEST:  No increased effort  HEART:  Regular rate  ABDOMEN:  Soft, non-tender, non-distended, normoactive bowel sounds, no rebound or guarding  EXTREMITIES: No edema  SKIN:  Warm, dry  NEURO:  Calm, cooperative    LABS:                        9.5    10.65 )-----------( 192      ( 24 Nov 2024 04:43 )             30.3     Hemoglobin: 9.5 g/dL (11-24-24 @ 04:43)  Hemoglobin: 9.1 g/dL (11-23-24 @ 10:50)  Hemoglobin: 6.3 g/dL (11-22-24 @ 16:00)    11-24    139  |  104  |  11.6  ----------------------------<  163[H]  3.7   |  22.0  |  0.57    Ca    7.7[L]      24 Nov 2024 04:43    TPro  4.8[L]  /  Alb  2.9[L]  /  TBili  0.7  /  DBili  x   /  AST  40[H]  /  ALT  20  /  AlkPhos  184[H]  11-22    LIVER FUNCTIONS - ( 22 Nov 2024 16:00 )  Alb: 2.9 g/dL / Pro: 4.8 g/dL / ALK PHOS: 184 U/L / ALT: 20 U/L / AST: 40 U/L / GGT: x                                                     RADIOLOGY & ADDITIONAL STUDIES:

## 2024-11-24 NOTE — PROGRESS NOTE ADULT - SUBJECTIVE AND OBJECTIVE BOX
Sapna Andres M.D.    Patient is a 39y old  Female who presents with a chief complaint of Acute on Chronic Anemia  Seizure like activity (23 Nov 2024 13:45)      SUBJECTIVE / OVERNIGHT EVENTS: no concerns.     Patient denies chest pain, SOB, abd pain, N/V, fever, chills, dysuria or any other complaints. All remainder ROS negative.     MEDICATIONS  (STANDING):  dexAMETHasone     Tablet 2 milliGRAM(s) Oral every 8 hours  FLUoxetine 80 milliGRAM(s) Oral at bedtime  furosemide    Tablet 40 milliGRAM(s) Oral daily  memantine 10 milliGRAM(s) Oral two times a day  methadone    Tablet 10 milliGRAM(s) Oral <User Schedule>  methadone    Tablet 15 milliGRAM(s) Oral at bedtime  methylphenidate 20 milliGRAM(s) Oral <User Schedule>  octreotide  Injectable 100 MICROGram(s) SubCutaneous two times a day  omega-3-Acid Ethyl Esters 2 Gram(s) Oral two times a day  pantoprazole  Injectable 40 milliGRAM(s) IV Push two times a day  pregabalin 200 milliGRAM(s) Oral three times a day    MEDICATIONS  (PRN):  acetaminophen     Tablet .. 650 milliGRAM(s) Oral every 6 hours PRN Temp greater or equal to 38C (100.4F), Mild Pain (1 - 3)  albuterol    90 MICROgram(s) HFA Inhaler 2 Puff(s) Inhalation every 6 hours PRN Shortness of Breath and/or Wheezing  aluminum hydroxide/magnesium hydroxide/simethicone Suspension 30 milliLiter(s) Oral every 4 hours PRN Dyspepsia  diazepam    Tablet 2 milliGRAM(s) Oral every 6 hours PRN for anxiety  HYDROmorphone   Tablet 4 milliGRAM(s) Oral three times a day PRN for moderate pain  HYDROmorphone   Tablet 8 milliGRAM(s) Oral every 6 hours PRN Severe Pain (7 - 10)  HYDROmorphone  Injectable 2 milliGRAM(s) IV Push four times a day PRN breakthrough pain  melatonin 3 milliGRAM(s) Oral at bedtime PRN Insomnia  ondansetron Injectable 4 milliGRAM(s) IV Push every 8 hours PRN Nausea and/or Vomiting  oxymetazoline 0.05% Nasal Spray 2 Spray(s) Both Nostrils two times a day PRN Epistaxis      I&O's Summary    23 Nov 2024 07:01  -  24 Nov 2024 07:00  --------------------------------------------------------  IN: 480 mL / OUT: 0 mL / NET: 480 mL        PHYSICAL EXAM:  Vital Signs Last 24 Hrs  T(C): 36.3 (24 Nov 2024 04:53), Max: 36.8 (23 Nov 2024 12:06)  T(F): 97.4 (24 Nov 2024 04:53), Max: 98.2 (23 Nov 2024 12:06)  HR: 87 (24 Nov 2024 04:53) (77 - 87)  BP: 115/72 (24 Nov 2024 04:53) (96/66 - 115/72)  BP(mean): 86 (24 Nov 2024 04:53) (76 - 86)  RR: 18 (24 Nov 2024 04:53) (15 - 19)  SpO2: 95% (24 Nov 2024 04:53) (92% - 96%)    Parameters below as of 24 Nov 2024 04:53  Patient On (Oxygen Delivery Method): room air    CONSTITUTIONAL: NAD, on vEEG  RESPIRATORY: Normal respiratory effort; lungs are clear to auscultation bilaterally  CARDIOVASCULAR: Regular rate and rhythm, no LE edema  ABDOMEN: Nontender to palpation, normoactive bowel sounds  PSYCH: A+O x3; affect appropriate  NEUROLOGY: CN 2-12 are intact and symmetric; no gross sensory deficits;   SKIN: No rashes; no palpable lesions    LABS:                        9.5    10.65 )-----------( 192      ( 24 Nov 2024 04:43 )             30.3     11-24    139  |  104  |  11.6  ----------------------------<  163[H]  3.7   |  22.0  |  0.57    Ca    7.7[L]      24 Nov 2024 04:43    TPro  4.8[L]  /  Alb  2.9[L]  /  TBili  0.7  /  DBili  x   /  AST  40[H]  /  ALT  20  /  AlkPhos  184[H]  11-22          Urinalysis Basic - ( 24 Nov 2024 04:43 )    Color: x / Appearance: x / SG: x / pH: x  Gluc: 163 mg/dL / Ketone: x  / Bili: x / Urobili: x   Blood: x / Protein: x / Nitrite: x   Leuk Esterase: x / RBC: x / WBC x   Sq Epi: x / Non Sq Epi: x / Bacteria: x        CAPILLARY BLOOD GLUCOSE          RADIOLOGY & ADDITIONAL TESTS:  Results Reviewed:   Imaging Personally Reviewed:  Electrocardiogram Personally Reviewed:

## 2024-11-25 ENCOUNTER — TRANSCRIPTION ENCOUNTER (OUTPATIENT)
Age: 39
End: 2024-11-25

## 2024-11-25 LAB
ANION GAP SERPL CALC-SCNC: 11 MMOL/L — SIGNIFICANT CHANGE UP (ref 5–17)
BUN SERPL-MCNC: 14.3 MG/DL — SIGNIFICANT CHANGE UP (ref 8–20)
CALCIUM SERPL-MCNC: 7.4 MG/DL — LOW (ref 8.4–10.5)
CHLORIDE SERPL-SCNC: 102 MMOL/L — SIGNIFICANT CHANGE UP (ref 96–108)
CO2 SERPL-SCNC: 26 MMOL/L — SIGNIFICANT CHANGE UP (ref 22–29)
CREAT SERPL-MCNC: 0.53 MG/DL — SIGNIFICANT CHANGE UP (ref 0.5–1.3)
EGFR: 121 ML/MIN/1.73M2 — SIGNIFICANT CHANGE UP
GLUCOSE SERPL-MCNC: 174 MG/DL — HIGH (ref 70–99)
HCT VFR BLD CALC: 30.7 % — LOW (ref 34.5–45)
HGB BLD-MCNC: 9.6 G/DL — LOW (ref 11.5–15.5)
MCHC RBC-ENTMCNC: 29.6 PG — SIGNIFICANT CHANGE UP (ref 27–34)
MCHC RBC-ENTMCNC: 31.3 G/DL — LOW (ref 32–36)
MCV RBC AUTO: 94.8 FL — SIGNIFICANT CHANGE UP (ref 80–100)
PLATELET # BLD AUTO: 180 K/UL — SIGNIFICANT CHANGE UP (ref 150–400)
POTASSIUM SERPL-MCNC: 4.1 MMOL/L — SIGNIFICANT CHANGE UP (ref 3.5–5.3)
POTASSIUM SERPL-SCNC: 4.1 MMOL/L — SIGNIFICANT CHANGE UP (ref 3.5–5.3)
RBC # BLD: 3.24 M/UL — LOW (ref 3.8–5.2)
RBC # FLD: 24.1 % — HIGH (ref 10.3–14.5)
SODIUM SERPL-SCNC: 139 MMOL/L — SIGNIFICANT CHANGE UP (ref 135–145)
WBC # BLD: 8.94 K/UL — SIGNIFICANT CHANGE UP (ref 3.8–10.5)
WBC # FLD AUTO: 8.94 K/UL — SIGNIFICANT CHANGE UP (ref 3.8–10.5)

## 2024-11-25 PROCEDURE — 99232 SBSQ HOSP IP/OBS MODERATE 35: CPT

## 2024-11-25 RX ORDER — IRON SUCROSE 20 MG/ML
200 INJECTION, SOLUTION INTRAVENOUS ONCE
Refills: 0 | Status: COMPLETED | OUTPATIENT
Start: 2024-11-25 | End: 2024-11-25

## 2024-11-25 RX ORDER — IRON SUCROSE 20 MG/ML
200 INJECTION, SOLUTION INTRAVENOUS ONCE
Refills: 0 | Status: DISCONTINUED | OUTPATIENT
Start: 2024-11-25 | End: 2024-11-25

## 2024-11-25 RX ORDER — SOD SULF/SODIUM/NAHCO3/KCL/PEG
4000 SOLUTION, RECONSTITUTED, ORAL ORAL ONCE
Refills: 0 | Status: COMPLETED | OUTPATIENT
Start: 2024-11-25 | End: 2024-11-25

## 2024-11-25 RX ORDER — CHLORHEXIDINE GLUCONATE 1.2 MG/ML
1 RINSE ORAL DAILY
Refills: 0 | Status: DISCONTINUED | OUTPATIENT
Start: 2024-11-25 | End: 2024-11-26

## 2024-11-25 RX ADMIN — HYDROMORPHONE HYDROCHLORIDE 2 MILLIGRAM(S): 2 TABLET ORAL at 10:00

## 2024-11-25 RX ADMIN — HYDROMORPHONE HYDROCHLORIDE 8 MILLIGRAM(S): 2 TABLET ORAL at 22:30

## 2024-11-25 RX ADMIN — METHYLPHENIDATE HYDROCHLORIDE 20 MILLIGRAM(S): 27 TABLET, EXTENDED RELEASE ORAL at 13:16

## 2024-11-25 RX ADMIN — HYDROMORPHONE HYDROCHLORIDE 8 MILLIGRAM(S): 2 TABLET ORAL at 13:17

## 2024-11-25 RX ADMIN — Medication 80 MILLIGRAM(S): at 21:59

## 2024-11-25 RX ADMIN — DEXAMETHASONE 2 MILLIGRAM(S): 1.5 TABLET ORAL at 06:22

## 2024-11-25 RX ADMIN — Medication 4000 MILLILITER(S): at 16:00

## 2024-11-25 RX ADMIN — OCTREOTIDE ACETATE 100 MICROGRAM(S): 500 INJECTION, SOLUTION INTRAVENOUS; SUBCUTANEOUS at 06:21

## 2024-11-25 RX ADMIN — DEXAMETHASONE 2 MILLIGRAM(S): 1.5 TABLET ORAL at 21:58

## 2024-11-25 RX ADMIN — HYDROMORPHONE HYDROCHLORIDE 8 MILLIGRAM(S): 2 TABLET ORAL at 21:58

## 2024-11-25 RX ADMIN — MEMANTINE HYDROCHLORIDE 10 MILLIGRAM(S): 14 CAPSULE, EXTENDED RELEASE ORAL at 06:23

## 2024-11-25 RX ADMIN — DIAZEPAM 2 MILLIGRAM(S): 10 TABLET ORAL at 15:08

## 2024-11-25 RX ADMIN — PANTOPRAZOLE SODIUM 40 MILLIGRAM(S): 40 TABLET, DELAYED RELEASE ORAL at 18:07

## 2024-11-25 RX ADMIN — MEMANTINE HYDROCHLORIDE 10 MILLIGRAM(S): 14 CAPSULE, EXTENDED RELEASE ORAL at 18:08

## 2024-11-25 RX ADMIN — DEXAMETHASONE 2 MILLIGRAM(S): 1.5 TABLET ORAL at 13:16

## 2024-11-25 RX ADMIN — HYDROMORPHONE HYDROCHLORIDE 2 MILLIGRAM(S): 2 TABLET ORAL at 02:50

## 2024-11-25 RX ADMIN — OCTREOTIDE ACETATE 100 MICROGRAM(S): 500 INJECTION, SOLUTION INTRAVENOUS; SUBCUTANEOUS at 18:08

## 2024-11-25 RX ADMIN — PREGABALIN 200 MILLIGRAM(S): 75 CAPSULE ORAL at 06:22

## 2024-11-25 RX ADMIN — METHADONE HYDROCHLORIDE 10 MILLIGRAM(S): 5 TABLET ORAL at 08:12

## 2024-11-25 RX ADMIN — PREGABALIN 200 MILLIGRAM(S): 75 CAPSULE ORAL at 22:00

## 2024-11-25 RX ADMIN — METHADONE HYDROCHLORIDE 15 MILLIGRAM(S): 5 TABLET ORAL at 22:01

## 2024-11-25 RX ADMIN — HYDROMORPHONE HYDROCHLORIDE 2 MILLIGRAM(S): 2 TABLET ORAL at 09:02

## 2024-11-25 RX ADMIN — METHYLPHENIDATE HYDROCHLORIDE 20 MILLIGRAM(S): 27 TABLET, EXTENDED RELEASE ORAL at 08:12

## 2024-11-25 RX ADMIN — METHADONE HYDROCHLORIDE 10 MILLIGRAM(S): 5 TABLET ORAL at 13:16

## 2024-11-25 RX ADMIN — IRON SUCROSE 110 MILLIGRAM(S): 20 INJECTION, SOLUTION INTRAVENOUS at 12:01

## 2024-11-25 RX ADMIN — Medication 2 GRAM(S): at 18:08

## 2024-11-25 RX ADMIN — FUROSEMIDE 40 MILLIGRAM(S): 40 TABLET ORAL at 06:22

## 2024-11-25 RX ADMIN — HYDROMORPHONE HYDROCHLORIDE 2 MILLIGRAM(S): 2 TABLET ORAL at 02:24

## 2024-11-25 RX ADMIN — Medication 2 GRAM(S): at 06:20

## 2024-11-25 RX ADMIN — HYDROMORPHONE HYDROCHLORIDE 2 MILLIGRAM(S): 2 TABLET ORAL at 18:20

## 2024-11-25 RX ADMIN — POLYETHYLENE GLYCOL 3350 17 GRAM(S): 17 POWDER, FOR SOLUTION ORAL at 18:08

## 2024-11-25 RX ADMIN — CHLORHEXIDINE GLUCONATE 1 APPLICATION(S): 1.2 RINSE ORAL at 13:20

## 2024-11-25 RX ADMIN — PREGABALIN 200 MILLIGRAM(S): 75 CAPSULE ORAL at 13:16

## 2024-11-25 RX ADMIN — POLYETHYLENE GLYCOL 3350 17 GRAM(S): 17 POWDER, FOR SOLUTION ORAL at 06:21

## 2024-11-25 RX ADMIN — PANTOPRAZOLE SODIUM 40 MILLIGRAM(S): 40 TABLET, DELAYED RELEASE ORAL at 06:21

## 2024-11-25 RX ADMIN — HYDROMORPHONE HYDROCHLORIDE 8 MILLIGRAM(S): 2 TABLET ORAL at 14:15

## 2024-11-25 NOTE — PROGRESS NOTE ADULT - ASSESSMENT
38 yo female with metastatic breast cancer ER/VT Neg, HER-2 pos on chemotherapy (mets to lung, liver, spleen, spine, bone and brain) presenting to the ED from Oncologist office for blood transfusion. She has had multiple blood transfusions, most recently end of October 2024. At that time, she had EGD done which revealed just gastritis. While in the ED, she had an episode of shaking and word finding difficulty.     Tremors, Word Finding Difficulty  -Admit to medicine  -CT head showing stable areas of vasogenic edema 2/2 brain mets  -Currently on EEG to r/o seizure  -Neurology eval - c/w Decadron 2 mg Q8  -dc Keppra for now  -Neurosx recs ezekiel     Acute on Chronic Anemia   -Hgb 6.3 sp 2u prbc, now Hg up to 9.5  -Prior admission had EGD showing gastritis and was sent home with PPI and Carafate  -PPI IV, continue Octreotide subQ  -GI following, colonoscopy planned for 11/26    Metastatic Breast Ca  -On Kadcyla 227 IV injection Q3 weeks   -Pain control per home regimen with Methadone and PO Dilaudid, will add Dilaudid IV for breakthrough pain  -outpt onc followup    Anxiety, Depression  -Continue Fluoxetine 40 mg daily  -Continue Diazepam 2 mg Q6 PRN Anxiety    DVTppx: On hold 2/2 anemia/brain mets  GOC: Full Code  Dispo: D/c home pending colonoscopy

## 2024-11-25 NOTE — PROGRESS NOTE ADULT - REASON FOR ADMISSION
Acute on Chronic Anemia  Seizure like activity
Acute on Chronic Anemia  Seizure like activity
Subjective   History of Present Illness  74-year-old female was in the hospital last week for hemoptysis and bronchoscopy.  She has COPD but no new pneumonia was found.  The patient states that she was on antibiotics.  The patient apparently had some diarrhea that started last night and then today had diarrhea that was mushy and then became liquid and saw her doctor who ordered a C. difficile test.  She apparently received a phone call from the hospital who advised her to go to the emergency department as C.diff could be dangerous.  The patient states that it has been several hours since she last had a bowel movement.  She is had no fever or chills melena or hematochezia.  Review of Systems   Constitutional: Positive for fatigue.   HENT: Negative.    Eyes: Negative.    Respiratory: Positive for shortness of breath.    Cardiovascular: Negative.    Gastrointestinal: Positive for diarrhea.   Endocrine: Negative.    Genitourinary: Negative.    Musculoskeletal: Positive for arthralgias.   Allergic/Immunologic: Negative.    Neurological: Positive for weakness.   Hematological: Negative.    Psychiatric/Behavioral: Positive for dysphoric mood.       Past Medical History:   Diagnosis Date   • Anesthesia complication     confusion due to carbon monoxide   pt states    • Anxiety 9/1/2021    Formatting of this note might be different from the original. 1/13/2020 -   C/w klon 0.5 in am, 1.0 at night.   • Anxiety    • COPD (chronic obstructive pulmonary disease) (HCC) 9/1/2021    Formatting of this note might be different from the original. [facility] won't pay for Ventolin - must be ProAir   • Esophageal stricture 9/1/2021    Formatting of this note might be different from the original. 8/23/21 -EGD & C-scope - [place] -  upper esophageal stricture, dilated.  Mild grade a erosive esophagitis.   • Fatty liver 6/1/2021    Formatting of this note might be different from the original. 3/2021 - RUQ at [place] showed ? Cirrhosis. H/o hepatitis 
and alcohol worried me.  Labs - Fibrosure confirmed fatty deposits but looks like mild-moderate fatty deposit.   No fibrosis (scarring) so doesn't appear to be cirrhosis. Rest of liver w/u negative.  4/8/21 - MR abdomen showed no cirrhosis. Just fatty liver.  6 mm benign lesion.  O   • GERD (gastroesophageal reflux disease) 9/1/2021    Formatting of this note might be different from the original. 8/23/21 -EGD & C-scope - [place] -  upper esophageal stricture, dilated.  Mild grade a erosive esophagitis.   • History of alcohol abuse 9/1/2021   • HLD (hyperlipidemia) 6/2/2014    Formatting of this note might be different from the original. Diet & Monitoring   • Hypertension    • Hypothyroidism 9/1/2021    Formatting of this note might be different from the original. 10/31/2020 -   75 up to 100.   • IBS (irritable bowel syndrome) 9/1/2021   • Metabolic encephalopathy 6/21/2019   • On home oxygen therapy    • Primary lung adenocarcinoma, right (HCC) 6/14/2019    Formatting of this note might be different from the original. 3/27/1805-[facility] Dose screening CT Chest without contrast-  1.  Lung RADS category 4B.  Irregular part solid nodule in the right lower lobe again noted. Solid component has increased in size and currently measures 7 mm  A PET could be considered although the size of the nodule is borderline from PET. 2.  Chronic changes of severe em   • RLS (restless legs syndrome) 7/9/2019    Formatting of this note might be different from the original. 6/2019 - eval with Dr. Li - started on Mirapex and pain sx improved!  Thinks 2/2 TM!   • Transverse myelitis (HCC)    • Vitamin D deficiency 5/14/2013    Formatting of this note might be different from the original. 9/18/2018 -   C/w 50k weekly       Allergies   Allergen Reactions   • Oxytetracycline Hives       Past Surgical History:   Procedure Laterality Date   • BRONCHOSCOPY N/A 12/15/2021    Procedure: BRONCHOSCOPY WITH BRONCHIAL WASHING;  Surgeon: Nehal Zamudio, 
Acute on Chronic Anemia  Seizure like activity
Acute on Chronic Anemia  Seizure like activity
MD;  Location: [facility] ENDOSCOPY;  Service: Pulmonary;  Laterality: N/A;  PNA, bronchitis   • CHOLECYSTECTOMY N/A 10/21/2021    Procedure: CHOLECYSTECTOMY LAPAROSCOPIC;  Surgeon: Hailey Marsh MD;  Location: [facility] MAIN OR;  Service: General;  Laterality: N/A;   • ERCP N/A 11/4/2021    Procedure: ENDOSCOPIC RETROGRADE CHOLANGIOPANCREATOGRAPHY with sphincterotomy, placement of biliary stent;  Surgeon: Chuck Bran MD;  Location: [facility] ENDOSCOPY;  Service: Gastroenterology;  Laterality: N/A;  post op: bile leak   • KNEE ARTHROSCOPY Left     x 2    • LUNG REMOVAL, PARTIAL Right 2019   • MASTECTOMY     • THYROID SURGERY         Family History   Problem Relation Age of Onset   • Cholecystitis Mother        Social History     Socioeconomic History   • Marital status:    Tobacco Use   • Smoking status: Former Smoker     Types: Cigarettes     Quit date: 2006     Years since quitting: 15.9   • Smokeless tobacco: Never Used   Vaping Use   • Vaping Use: Never used   Substance and Sexual Activity   • Alcohol use: Not Currently   • Drug use: Never   • Sexual activity: Defer           Objective   Physical Exam  Patient is awake and alert afebrile with stable vital signs pressure is 134/86 the HEENT exam is unremarkable her chest reveals rhonchi bilaterally cardiovascular exam reveals regular rhythm her abdomen is soft bowel sounds are positive she has no guarding rebound masses there is no CVAT she has no cyanosis clubbing or edema  Procedures           ED Course         I was able to access the lab where she had her specimen for C. difficile done earlier today and it was negative for C. difficile.                                        MDM  The patient has no evidence of C. difficile.  She can use Imodium for diarrhea and is to continue with her other medication  Final diagnoses:   Diarrhea, unspecified type   Chronic obstructive pulmonary disease, unspecified COPD type (HCC)       ED Disposition  ED Disposition     None 
         No follow-up provider specified.       Medication List      No changes were made to your prescriptions during this visit.          Sharath Lynch MD  12/23/21 0271    
Acute on Chronic Anemia  Seizure like activity

## 2024-11-25 NOTE — DISCHARGE NOTE NURSING/CASE MANAGEMENT/SOCIAL WORK - NSDCPEFALRISK_GEN_ALL_CORE
For information on Fall & Injury Prevention, visit: [email or web address] OR  [email or web address] OR  https://www.cdc.gov/steadi/patient.html

## 2024-11-25 NOTE — DISCHARGE NOTE NURSING/CASE MANAGEMENT/SOCIAL WORK - PATIENT PORTAL LINK FT
You can access the FollowMyHealth Patient Portal offered by [facility] by registering at the following website: [email or web address]. By joining [facility]’s FollowMyHealth portal, you will also be able to view your health information using other applications (apps) compatible with our system.

## 2024-11-25 NOTE — PROGRESS NOTE ADULT - SUBJECTIVE AND OBJECTIVE BOX
Sapna Andres M.D.    Patient is a 39y old  Female who presents with a chief complaint of Acute on Chronic Anemia  Seizure like activity (24 Nov 2024 13:34)      SUBJECTIVE / OVERNIGHT EVENTS: no concerns.     Patient denies chest pain, SOB, abd pain, N/V, fever, chills, dysuria or any other complaints. All remainder ROS negative.     MEDICATIONS  (STANDING):  chlorhexidine 2% Cloths 1 Application(s) Topical daily  dexAMETHasone     Tablet 2 milliGRAM(s) Oral every 8 hours  FLUoxetine 80 milliGRAM(s) Oral at bedtime  furosemide    Tablet 40 milliGRAM(s) Oral daily  iron sucrose IVPB 200 milliGRAM(s) IV Intermittent once  memantine 10 milliGRAM(s) Oral two times a day  methadone    Tablet 10 milliGRAM(s) Oral <User Schedule>  methadone    Tablet 15 milliGRAM(s) Oral at bedtime  methylphenidate 20 milliGRAM(s) Oral <User Schedule>  octreotide  Injectable 100 MICROGram(s) SubCutaneous two times a day  omega-3-Acid Ethyl Esters 2 Gram(s) Oral two times a day  pantoprazole  Injectable 40 milliGRAM(s) IV Push two times a day  polyethylene glycol 3350 17 Gram(s) Oral two times a day  pregabalin 200 milliGRAM(s) Oral three times a day    MEDICATIONS  (PRN):  acetaminophen     Tablet .. 650 milliGRAM(s) Oral every 6 hours PRN Temp greater or equal to 38C (100.4F), Mild Pain (1 - 3)  albuterol    90 MICROgram(s) HFA Inhaler 2 Puff(s) Inhalation every 6 hours PRN Shortness of Breath and/or Wheezing  aluminum hydroxide/magnesium hydroxide/simethicone Suspension 30 milliLiter(s) Oral every 4 hours PRN Dyspepsia  diazepam    Tablet 2 milliGRAM(s) Oral every 6 hours PRN for anxiety  HYDROmorphone   Tablet 4 milliGRAM(s) Oral three times a day PRN for moderate pain  HYDROmorphone   Tablet 8 milliGRAM(s) Oral every 6 hours PRN Severe Pain (7 - 10)  HYDROmorphone  Injectable 2 milliGRAM(s) IV Push four times a day PRN breakthrough pain  melatonin 3 milliGRAM(s) Oral at bedtime PRN Insomnia  ondansetron Injectable 4 milliGRAM(s) IV Push every 8 hours PRN Nausea and/or Vomiting  oxymetazoline 0.05% Nasal Spray 2 Spray(s) Both Nostrils two times a day PRN Epistaxis      I&O's Summary      PHYSICAL EXAM:  Vital Signs Last 24 Hrs  T(C): 36.3 (25 Nov 2024 08:20), Max: 36.7 (24 Nov 2024 21:08)  T(F): 97.4 (25 Nov 2024 08:20), Max: 98 (24 Nov 2024 21:08)  HR: 60 (25 Nov 2024 08:20) (60 - 77)  BP: 106/72 (25 Nov 2024 08:20) (104/70 - 115/76)  BP(mean): 85 (25 Nov 2024 04:38) (85 - 85)  RR: 18 (25 Nov 2024 08:20) (18 - 19)  SpO2: 95% (25 Nov 2024 08:20) (95% - 98%)    Parameters below as of 25 Nov 2024 08:20  Patient On (Oxygen Delivery Method): room air      CONSTITUTIONAL: NAD  RESPIRATORY: Normal respiratory effort; lungs are clear to auscultation bilaterally  CARDIOVASCULAR: Regular rate and rhythm, no LE edema  ABDOMEN: Nontender to palpation, normoactive bowel sounds  PSYCH: A+O x3; affect appropriate  NEUROLOGY: CN 2-12 are intact and symmetric; no gross sensory deficits;   SKIN: No rashes; no palpable lesions    LABS:                        9.6    8.94  )-----------( 180      ( 25 Nov 2024 05:40 )             30.7     11-25    139  |  102  |  14.3  ----------------------------<  174[H]  4.1   |  26.0  |  0.53    Ca    7.4[L]      25 Nov 2024 05:40            Urinalysis Basic - ( 25 Nov 2024 05:40 )    Color: x / Appearance: x / SG: x / pH: x  Gluc: 174 mg/dL / Ketone: x  / Bili: x / Urobili: x   Blood: x / Protein: x / Nitrite: x   Leuk Esterase: x / RBC: x / WBC x   Sq Epi: x / Non Sq Epi: x / Bacteria: x        CAPILLARY BLOOD GLUCOSE          RADIOLOGY & ADDITIONAL TESTS:  Results Reviewed:   Imaging Personally Reviewed:  Electrocardiogram Personally Reviewed:

## 2024-11-25 NOTE — DISCHARGE NOTE NURSING/CASE MANAGEMENT/SOCIAL WORK - FINANCIAL ASSISTANCE
[facility] provides services at a reduced cost to those who are determined to be eligible through [facility]’s financial assistance program. Information regarding [facility]’s financial assistance program can be found by going to [email or web address] or by calling [phone].

## 2024-11-26 ENCOUNTER — TRANSCRIPTION ENCOUNTER (OUTPATIENT)
Age: 39
End: 2024-11-26

## 2024-11-26 VITALS
HEART RATE: 65 BPM | TEMPERATURE: 98 F | SYSTOLIC BLOOD PRESSURE: 119 MMHG | DIASTOLIC BLOOD PRESSURE: 76 MMHG | OXYGEN SATURATION: 99 % | RESPIRATION RATE: 16 BRPM

## 2024-11-26 LAB
ANION GAP SERPL CALC-SCNC: 12 MMOL/L — SIGNIFICANT CHANGE UP (ref 5–17)
BUN SERPL-MCNC: 8.9 MG/DL — SIGNIFICANT CHANGE UP (ref 8–20)
CALCIUM SERPL-MCNC: 6.8 MG/DL — LOW (ref 8.4–10.5)
CHLORIDE SERPL-SCNC: 98 MMOL/L — SIGNIFICANT CHANGE UP (ref 96–108)
CO2 SERPL-SCNC: 27 MMOL/L — SIGNIFICANT CHANGE UP (ref 22–29)
CREAT SERPL-MCNC: 0.47 MG/DL — LOW (ref 0.5–1.3)
EGFR: 125 ML/MIN/1.73M2 — SIGNIFICANT CHANGE UP
GLUCOSE SERPL-MCNC: 121 MG/DL — HIGH (ref 70–99)
HCT VFR BLD CALC: 33.3 % — LOW (ref 34.5–45)
HGB BLD-MCNC: 10.3 G/DL — LOW (ref 11.5–15.5)
INR BLD: 1.26 RATIO — HIGH (ref 0.85–1.16)
MCHC RBC-ENTMCNC: 29.4 PG — SIGNIFICANT CHANGE UP (ref 27–34)
MCHC RBC-ENTMCNC: 30.9 G/DL — LOW (ref 32–36)
MCV RBC AUTO: 95.1 FL — SIGNIFICANT CHANGE UP (ref 80–100)
NRBC # BLD: 1 /100 WBCS — HIGH (ref 0–0)
PLATELET # BLD AUTO: 151 K/UL — SIGNIFICANT CHANGE UP (ref 150–400)
POTASSIUM SERPL-MCNC: 3.3 MMOL/L — LOW (ref 3.5–5.3)
POTASSIUM SERPL-SCNC: 3.3 MMOL/L — LOW (ref 3.5–5.3)
PROTHROM AB SERPL-ACNC: 14.2 SEC — HIGH (ref 9.9–13.4)
RBC # BLD: 3.5 M/UL — LOW (ref 3.8–5.2)
RBC # FLD: 22.2 % — HIGH (ref 10.3–14.5)
SODIUM SERPL-SCNC: 137 MMOL/L — SIGNIFICANT CHANGE UP (ref 135–145)
WBC # BLD: 6.4 K/UL — SIGNIFICANT CHANGE UP (ref 3.8–10.5)
WBC # FLD AUTO: 6.4 K/UL — SIGNIFICANT CHANGE UP (ref 3.8–10.5)

## 2024-11-26 PROCEDURE — 83735 ASSAY OF MAGNESIUM: CPT

## 2024-11-26 PROCEDURE — 71045 X-RAY EXAM CHEST 1 VIEW: CPT

## 2024-11-26 PROCEDURE — 88108 CYTOPATH CONCENTRATE TECH: CPT

## 2024-11-26 PROCEDURE — 96375 TX/PRO/DX INJ NEW DRUG ADDON: CPT

## 2024-11-26 PROCEDURE — 36415 COLL VENOUS BLD VENIPUNCTURE: CPT

## 2024-11-26 PROCEDURE — 96374 THER/PROPH/DIAG INJ IV PUSH: CPT

## 2024-11-26 PROCEDURE — 36430 TRANSFUSION BLD/BLD COMPNT: CPT

## 2024-11-26 PROCEDURE — 93005 ELECTROCARDIOGRAM TRACING: CPT

## 2024-11-26 PROCEDURE — C9399: CPT

## 2024-11-26 PROCEDURE — 86900 BLOOD TYPING SEROLOGIC ABO: CPT

## 2024-11-26 PROCEDURE — 87640 STAPH A DNA AMP PROBE: CPT

## 2024-11-26 PROCEDURE — A9579: CPT

## 2024-11-26 PROCEDURE — 95714 VEEG EA 12-26 HR UNMNTR: CPT

## 2024-11-26 PROCEDURE — 85027 COMPLETE CBC AUTOMATED: CPT

## 2024-11-26 PROCEDURE — 87086 URINE CULTURE/COLONY COUNT: CPT

## 2024-11-26 PROCEDURE — 70496 CT ANGIOGRAPHY HEAD: CPT | Mod: MC

## 2024-11-26 PROCEDURE — 70551 MRI BRAIN STEM W/O DYE: CPT | Mod: MC

## 2024-11-26 PROCEDURE — 85730 THROMBOPLASTIN TIME PARTIAL: CPT

## 2024-11-26 PROCEDURE — 88342 IMHCHEM/IMCYTCHM 1ST ANTB: CPT

## 2024-11-26 PROCEDURE — 87641 MR-STAPH DNA AMP PROBE: CPT

## 2024-11-26 PROCEDURE — 81025 URINE PREGNANCY TEST: CPT

## 2024-11-26 PROCEDURE — 80053 COMPREHEN METABOLIC PANEL: CPT

## 2024-11-26 PROCEDURE — 0042T: CPT | Mod: MC

## 2024-11-26 PROCEDURE — 83036 HEMOGLOBIN GLYCOSYLATED A1C: CPT

## 2024-11-26 PROCEDURE — 82140 ASSAY OF AMMONIA: CPT

## 2024-11-26 PROCEDURE — 72147 MRI CHEST SPINE W/DYE: CPT | Mod: MC

## 2024-11-26 PROCEDURE — 96376 TX/PRO/DX INJ SAME DRUG ADON: CPT

## 2024-11-26 PROCEDURE — P9040: CPT

## 2024-11-26 PROCEDURE — 86850 RBC ANTIBODY SCREEN: CPT

## 2024-11-26 PROCEDURE — 86901 BLOOD TYPING SEROLOGIC RH(D): CPT

## 2024-11-26 PROCEDURE — 95700 EEG CONT REC W/VID EEG TECH: CPT

## 2024-11-26 PROCEDURE — 99285 EMERGENCY DEPT VISIT HI MDM: CPT | Mod: 25

## 2024-11-26 PROCEDURE — 88184 FLOWCYTOMETRY/ TC 1 MARKER: CPT

## 2024-11-26 PROCEDURE — 85025 COMPLETE CBC W/AUTO DIFF WBC: CPT

## 2024-11-26 PROCEDURE — 84157 ASSAY OF PROTEIN OTHER: CPT

## 2024-11-26 PROCEDURE — 83540 ASSAY OF IRON: CPT

## 2024-11-26 PROCEDURE — 84484 ASSAY OF TROPONIN QUANT: CPT

## 2024-11-26 PROCEDURE — 84702 CHORIONIC GONADOTROPIN TEST: CPT

## 2024-11-26 PROCEDURE — 87483 CNS DNA AMP PROBE TYPE 12-25: CPT

## 2024-11-26 PROCEDURE — 88185 FLOWCYTOMETRY/TC ADD-ON: CPT

## 2024-11-26 PROCEDURE — 82962 GLUCOSE BLOOD TEST: CPT

## 2024-11-26 PROCEDURE — 85610 PROTHROMBIN TIME: CPT

## 2024-11-26 PROCEDURE — 70450 CT HEAD/BRAIN W/O DYE: CPT | Mod: MC

## 2024-11-26 PROCEDURE — 72142 MRI NECK SPINE W/DYE: CPT | Mod: MC

## 2024-11-26 PROCEDURE — 92523 SPEECH SOUND LANG COMPREHEN: CPT

## 2024-11-26 PROCEDURE — 70498 CT ANGIOGRAPHY NECK: CPT | Mod: MC

## 2024-11-26 PROCEDURE — 80048 BASIC METABOLIC PNL TOTAL CA: CPT

## 2024-11-26 PROCEDURE — 86922 COMPATIBILITY TEST ANTIGLOB: CPT

## 2024-11-26 PROCEDURE — 95711 VEEG 2-12 HR UNMONITORED: CPT

## 2024-11-26 PROCEDURE — 81001 URINALYSIS AUTO W/SCOPE: CPT

## 2024-11-26 PROCEDURE — 45378 DIAGNOSTIC COLONOSCOPY: CPT

## 2024-11-26 PROCEDURE — 82945 GLUCOSE OTHER FLUID: CPT

## 2024-11-26 PROCEDURE — 99285 EMERGENCY DEPT VISIT HI MDM: CPT

## 2024-11-26 PROCEDURE — 80061 LIPID PANEL: CPT

## 2024-11-26 PROCEDURE — 70553 MRI BRAIN STEM W/O & W/DYE: CPT | Mod: MC

## 2024-11-26 PROCEDURE — 89051 BODY FLUID CELL COUNT: CPT

## 2024-11-26 PROCEDURE — 99239 HOSP IP/OBS DSCHRG MGMT >30: CPT

## 2024-11-26 PROCEDURE — 95813 EEG EXTND MNTR 61-119 MIN: CPT

## 2024-11-26 PROCEDURE — 82728 ASSAY OF FERRITIN: CPT

## 2024-11-26 PROCEDURE — 72149 MRI LUMBAR SPINE W/DYE: CPT | Mod: MC

## 2024-11-26 PROCEDURE — 87070 CULTURE OTHR SPECIMN AEROBIC: CPT

## 2024-11-26 PROCEDURE — 88305 TISSUE EXAM BY PATHOLOGIST: CPT

## 2024-11-26 PROCEDURE — 87205 SMEAR GRAM STAIN: CPT

## 2024-11-26 PROCEDURE — 87015 SPECIMEN INFECT AGNT CONCNTJ: CPT

## 2024-11-26 PROCEDURE — 86403 PARTICLE AGGLUT ANTBDY SCRN: CPT

## 2024-11-26 DEVICE — NAIL OSTEO 1.5X16MM STRL: Type: IMPLANTABLE DEVICE | Status: FUNCTIONAL

## 2024-11-26 RX ORDER — WITCH HAZEL 50 G/100ML
1 SOLUTION RECTAL
Refills: 0 | Status: DISCONTINUED | OUTPATIENT
Start: 2024-11-26 | End: 2024-11-26

## 2024-11-26 RX ORDER — METHADONE HYDROCHLORIDE 5 MG/1
3 TABLET ORAL
Qty: 0 | Refills: 0 | DISCHARGE
Start: 2024-11-26

## 2024-11-26 RX ORDER — FUROSEMIDE 40 MG/1
1 TABLET ORAL
Refills: 0 | DISCHARGE

## 2024-11-26 RX ORDER — POTASSIUM CHLORIDE 600 MG/1
40 TABLET, EXTENDED RELEASE ORAL ONCE
Refills: 0 | Status: COMPLETED | OUTPATIENT
Start: 2024-11-26 | End: 2024-11-26

## 2024-11-26 RX ORDER — DEXAMETHASONE 1.5 MG/1
1 TABLET ORAL
Qty: 42 | Refills: 0
Start: 2024-11-26 | End: 2024-12-09

## 2024-11-26 RX ORDER — METHADONE HYDROCHLORIDE 5 MG/1
2 TABLET ORAL
Qty: 0 | Refills: 0 | DISCHARGE
Start: 2024-11-26

## 2024-11-26 RX ORDER — HEPARIN SODIUM,PORCINE/PF 100/ML (1)
100 VIAL (ML) INTRAVENOUS ONCE
Refills: 0 | Status: COMPLETED | OUTPATIENT
Start: 2024-11-26 | End: 2024-11-26

## 2024-11-26 RX ORDER — METHADONE HYDROCHLORIDE 5 MG/1
1 TABLET ORAL
Refills: 0 | DISCHARGE

## 2024-11-26 RX ORDER — METHADONE HYDROCHLORIDE 5 MG/1
3 TABLET ORAL
Refills: 0 | DISCHARGE

## 2024-11-26 RX ADMIN — POTASSIUM CHLORIDE 40 MILLIEQUIVALENT(S): 600 TABLET, EXTENDED RELEASE ORAL at 13:13

## 2024-11-26 RX ADMIN — CHLORHEXIDINE GLUCONATE 1 APPLICATION(S): 1.2 RINSE ORAL at 13:14

## 2024-11-26 RX ADMIN — METHYLPHENIDATE HYDROCHLORIDE 20 MILLIGRAM(S): 27 TABLET, EXTENDED RELEASE ORAL at 13:13

## 2024-11-26 RX ADMIN — FUROSEMIDE 40 MILLIGRAM(S): 40 TABLET ORAL at 06:14

## 2024-11-26 RX ADMIN — HYDROMORPHONE HYDROCHLORIDE 8 MILLIGRAM(S): 2 TABLET ORAL at 14:24

## 2024-11-26 RX ADMIN — Medication 100 UNIT(S): at 15:43

## 2024-11-26 RX ADMIN — DEXAMETHASONE 2 MILLIGRAM(S): 1.5 TABLET ORAL at 06:14

## 2024-11-26 RX ADMIN — PANTOPRAZOLE SODIUM 40 MILLIGRAM(S): 40 TABLET, DELAYED RELEASE ORAL at 06:15

## 2024-11-26 RX ADMIN — HYDROMORPHONE HYDROCHLORIDE 8 MILLIGRAM(S): 2 TABLET ORAL at 04:19

## 2024-11-26 RX ADMIN — OCTREOTIDE ACETATE 100 MICROGRAM(S): 500 INJECTION, SOLUTION INTRAVENOUS; SUBCUTANEOUS at 06:15

## 2024-11-26 RX ADMIN — Medication 2 GRAM(S): at 06:14

## 2024-11-26 RX ADMIN — PREGABALIN 200 MILLIGRAM(S): 75 CAPSULE ORAL at 13:13

## 2024-11-26 RX ADMIN — POLYETHYLENE GLYCOL 3350 17 GRAM(S): 17 POWDER, FOR SOLUTION ORAL at 06:13

## 2024-11-26 RX ADMIN — MEMANTINE HYDROCHLORIDE 10 MILLIGRAM(S): 14 CAPSULE, EXTENDED RELEASE ORAL at 06:15

## 2024-11-26 RX ADMIN — DEXAMETHASONE 2 MILLIGRAM(S): 1.5 TABLET ORAL at 13:14

## 2024-11-26 RX ADMIN — WITCH HAZEL 1 APPLICATION(S): 50 SOLUTION RECTAL at 06:12

## 2024-11-26 RX ADMIN — METHADONE HYDROCHLORIDE 10 MILLIGRAM(S): 5 TABLET ORAL at 08:08

## 2024-11-26 RX ADMIN — METHADONE HYDROCHLORIDE 10 MILLIGRAM(S): 5 TABLET ORAL at 13:14

## 2024-11-26 RX ADMIN — PREGABALIN 200 MILLIGRAM(S): 75 CAPSULE ORAL at 06:14

## 2024-11-26 NOTE — DISCHARGE NOTE PROVIDER - NSDCMRMEDTOKEN_GEN_ALL_CORE_FT
Albuterol (Eqv-ProAir HFA) 90 mcg/inh inhalation aerosol: 2 puff(s) inhaled every 6 hours  dexAMETHasone 2 mg oral tablet: 1 tab(s) orally every 8 hours  diazePAM 2 mg oral tablet: 1 tab(s) orally every 6 hours as needed for  anxiety  Dilaudid 4 mg oral tablet: 1 tab(s) orally 3 times a day as needed for  moderate pain  Dilaudid 8 mg oral tablet: 1 tab(s) orally 4 times a day as needed for  severe pain MDD: 6  FLUoxetine 40 mg oral capsule: 2 cap(s) orally once a day (at bedtime)  Kadcyla 160 mg intravenous injection: 227 milligram(s) intravenous every 3 weeks  Lasix 40 mg oral tablet: 1 tab(s) orally once a day  octreotide 100 mcg/mL injectable solution: 100 microgram(s) 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  omega-3 polyunsaturated fatty acids: 1 tab(s) orally 2 times a day  pregabalin 200 mg oral capsule: 1 cap(s) orally 3 times a day  Protonix 40 mg oral delayed release tablet: 1 tab(s) orally 2 times a day  Ritalin 20 mg oral tablet: 1 tab(s) orally 2 times a day Morning and Afternoon   Albuterol (Eqv-ProAir HFA) 90 mcg/inh inhalation aerosol: 2 puff(s) inhaled every 6 hours  dexAMETHasone 2 mg oral tablet: 1 tab(s) orally every 8 hours  diazePAM 2 mg oral tablet: 1 tab(s) orally every 6 hours as needed for  anxiety  Dilaudid 4 mg oral tablet: 1 tab(s) orally 3 times a day as needed for  moderate pain  Dilaudid 8 mg oral tablet: 1 tab(s) orally 4 times a day as needed for  severe pain MDD: 6  FLUoxetine 40 mg oral capsule: 2 cap(s) orally once a day (at bedtime)  Kadcyla 160 mg intravenous injection: 227 milligram(s) intravenous every 3 weeks  methadone 10 mg oral tablet: 2 tab(s) orally 2 times a day  methadone 5 mg oral tablet: 3 tab(s) orally once a day (at bedtime)  octreotide 100 mcg/mL injectable solution: 100 microgram(s) 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  omega-3 polyunsaturated fatty acids: 1 tab(s) orally 2 times a day  pregabalin 200 mg oral capsule: 1 cap(s) orally 3 times a day  Protonix 40 mg oral delayed release tablet: 1 tab(s) orally 2 times a day  Ritalin 20 mg oral tablet: 1 tab(s) orally 2 times a day Morning and Afternoon

## 2024-11-26 NOTE — DISCHARGE NOTE PROVIDER - HOSPITAL COURSE
38 yo female with metastatic breast cancer ER/[place] Neg, HER-2 pos on chemotherapy (mets to lung, liver, spleen, spine, bone and brain) presenting to the ED from Oncologist office for blood transfusion. She has had multiple blood transfusions, most recently end of October 2024. At that time, she had EGD done which revealed just gastritis. While in the ED, she had an episode of shaking and word finding difficulty. Admitted initially for r/o seizure, s/p eeg which was negative. CTH with stable mets. D/w neuro, will cont c/w Decadron 2 mg Q8, with outpatient onc followup within 1 week of discharge. Course c/b anemia, suspect GIB. s/p 2u prbc, and s/p colonoscopy which was negative. Repeat Hg stable, cleared for discharge.

## 2024-11-26 NOTE — DISCHARGE NOTE PROVIDER - NSDCCPCAREPLAN_GEN_ALL_CORE_FT
PRINCIPAL DISCHARGE DIAGNOSIS  Diagnosis: Breast cancer metastasized to brain  Assessment and Plan of Treatment:       SECONDARY DISCHARGE DIAGNOSES  Diagnosis: AMS (altered mental status)  Assessment and Plan of Treatment:     Diagnosis: GIB (gastrointestinal bleeding)  Assessment and Plan of Treatment:

## 2024-11-26 NOTE — DISCHARGE NOTE PROVIDER - NSDCFUADDAPPT_GEN_ALL_CORE_FT
o	Contact (Madeline [phone])-[facility] for Snap Follow up    Followup with your oncologist within 2 weeks of discharge about continuation of the decadron.

## 2024-11-26 NOTE — CHART NOTE - NSCHARTNOTEFT_GEN_A_CORE
< from: Colonoscopy (24 @ 07:12) >    Colonoscopy Report        Date: 2024 7:12 AM        Patient Name: NATIVIDAD MYERS        MRN: [id number]        Account Number:        [id number]        Gender: Female         (age): 1985 (39)        Instrument(s):        [employer] [id number]        Attending/Fellow:        Mike Reddy MD                Procedure Room #:        PROCEDURE ROOM 2        Referring Physician:        RAÚL FLORES        [address] [place] [address] [phone] (phone)        PCP:        RÚAL FLORES                ASA Class:        P3 - 2024 9:08 AM Mike Reddy MD        History of Present Illness:        The patient presents for colonoscopic evaluation of  symptomatic anemia.        Administered Medications:        As per Anesthesiology Record        Indications:        Symptomatic anemia - D64.9        Procedure:        This is a high risk patient  undergoing a diagnostic colonoscopy. Prior    colonoscopy was performed on 10/30/2024. This colonoscopy isbeing performed    sooner than the recommended timeframe due to Inadequate prep.        The procedure, indications, preparation and potential complications were    explained to the patient, who indicated understanding and signed the    corresponding consent forms. MAC was administered by the anesthesiologist.    Continuous pulse oximetry and blood pressure monitoring were used throughout the    procedure. Supplemental oxygen was used. The quality of preparation was 6-9 on    the BBP scale/adequate. Patient was placed in left lateral decubitus position.    Digital exam was normal. The colonoscope was introduced through the rectum and    advanced under direct visualization until cecum was reached. at a distance of    100 cm. Visualization of None. was poor. The appendiceal orifice and the    ileocecal valve were identified. Careful visualization was performed as the    instrument was withdrawn. Retroflexion was not performed in the rectum for this    patient. Patient tolerance to procedure was excellent. The procedure was not    difficult. Blood loss was none.. Withdrawal time was 6 minutes..        [place] Bowel Preparation Score:        Using the [place] Bowel Preparation Score, each segment of the colon was graded    as follows:        Left Colon: Good, 2 | Transverse Colon: Good, 2  | Right Colon: Good, 2        Limitations/Complications:        There were no apparent limitations or complications        Findings:        Mucosa Normal mucosa was noted in the whole colon. There wereno AVMs, masses,    evidence of colitis or other abnormalities seen. Unable to retroflex.        Protruding lesions Medium non-bleeding grade/stage III internal hemorrhoids that    were thrombosed and skin tags were noted. Non-bleeding thrombosed internal    hemorrhoids seen in the anal canal upon withdrawal of the colonoscope.        Impressions:        Normal mucosa in the whole colon.        Grade/Stage III internal hemorrhoids.        Plan:        Advance diet as tolerated        Colonoscopy in 10 years, or sooner if clinically indicated.        Additional Notes:        Internal hemorrhoids o/w normal colonoscopy to the cecum. No further GI w/u    planned. Symptomatic anemia likely 2/2 chemotherapy.        Attending Participation:        I was present and participated during the entire procedure, including non-key    portions.        Mike Reddy MD        Version 1, Electronically signed on 2024 9:13:35 AM by Mike Reddy MD    < end of copied text >
Pt scheduled for colonoscopy tomorrow, 11/26/25 after 2 day prep. Hgb stable at 9.6. Pt will need Golytely prep tonight.  -Continue CLD  -NPO after MN  -prep ordered  -Maintain current T&S, INR <1.5, Hgb >7.0, Plt >50 prior to procedure
pt seen and examined, without concerns. Mild hypoglycemia this AM.     #r/o seizure  dc keppra, f/u MR brain and vEEG  neuro recs ezekiel  rest of care per HPI

## 2024-11-26 NOTE — DISCHARGE NOTE PROVIDER - ATTENDING DISCHARGE PHYSICAL EXAMINATION:
VITALS:   T(C): 36.5 (11-26-24 @ 08:17), Max: 36.6 (11-25-24 @ 15:50)  HR: 66 (11-26-24 @ 08:17) (61 - 69)  BP: 120/84 (11-26-24 @ 08:17) (104/72 - 120/84)  RR: 18 (11-26-24 @ 08:17) (18 - 18)  SpO2: 98% (11-26-24 @ 08:17) (95% - 98%)    CONSTITUTIONAL: NAD  RESPIRATORY: Normal respiratory effort; lungs are clear to auscultation bilaterally  CARDIOVASCULAR: Regular rate and rhythm, no LE edema  ABDOMEN: Nontender to palpation, normoactive bowel sounds  PSYCH: A+O x3; affect appropriate  NEUROLOGY: CN 2-12 are intact and symmetric; no gross sensory deficits;   SKIN: No rashes; no palpable lesions

## 2024-11-26 NOTE — DISCHARGE NOTE PROVIDER - CARE PROVIDER_API CALL
oncology,   Phone: (   )    -  Fax: (   )    -  Follow Up Time:     Miek Reddy  Gastroenterology  [address] [facility] [facility] [address]  [place] [address]  Phone: [phone]  Fax: [phone]  Follow Up Time:

## 2024-12-06 ENCOUNTER — EMERGENCY (EMERGENCY)
Facility: HOSPITAL | Age: 39
LOS: 1 days | Discharge: HOME | End: 2024-12-06
Attending: EMERGENCY MEDICINE
Payer: MEDICARE

## 2024-12-06 VITALS
OXYGEN SATURATION: 100 % | HEIGHT: 62 IN | WEIGHT: 154.32 LBS | DIASTOLIC BLOOD PRESSURE: 63 MMHG | HEART RATE: 73 BPM | TEMPERATURE: 98 F | RESPIRATION RATE: 20 BRPM | SYSTOLIC BLOOD PRESSURE: 98 MMHG

## 2024-12-06 VITALS
TEMPERATURE: 98 F | RESPIRATION RATE: 20 BRPM | HEART RATE: 71 BPM | DIASTOLIC BLOOD PRESSURE: 64 MMHG | OXYGEN SATURATION: 98 % | SYSTOLIC BLOOD PRESSURE: 101 MMHG

## 2024-12-06 DIAGNOSIS — Z98.890 OTHER SPECIFIED POSTPROCEDURAL STATES: Chronic | ICD-10-CM

## 2024-12-06 DIAGNOSIS — Z90.89 ACQUIRED ABSENCE OF OTHER ORGANS: Chronic | ICD-10-CM

## 2024-12-06 LAB
ALBUMIN SERPL ELPH-MCNC: 3.2 G/DL — LOW (ref 3.3–5.2)
ALP SERPL-CCNC: 207 U/L — HIGH (ref 40–120)
ALT FLD-CCNC: 64 U/L — HIGH
ANION GAP SERPL CALC-SCNC: 10 MMOL/L — SIGNIFICANT CHANGE UP (ref 5–17)
APTT BLD: 33.8 SEC — SIGNIFICANT CHANGE UP (ref 24.5–35.6)
AST SERPL-CCNC: 77 U/L — HIGH
BASOPHILS # BLD AUTO: 0.01 K/UL — SIGNIFICANT CHANGE UP (ref 0–0.2)
BASOPHILS NFR BLD AUTO: 0.1 % — SIGNIFICANT CHANGE UP (ref 0–2)
BILIRUB SERPL-MCNC: 0.4 MG/DL — SIGNIFICANT CHANGE UP (ref 0.4–2)
BLD GP AB SCN SERPL QL: SIGNIFICANT CHANGE UP
BUN SERPL-MCNC: 12.2 MG/DL — SIGNIFICANT CHANGE UP (ref 8–20)
CALCIUM SERPL-MCNC: 7.3 MG/DL — LOW (ref 8.4–10.5)
CHLORIDE SERPL-SCNC: 108 MMOL/L — SIGNIFICANT CHANGE UP (ref 96–108)
CO2 SERPL-SCNC: 21 MMOL/L — LOW (ref 22–29)
CREAT SERPL-MCNC: 0.42 MG/DL — LOW (ref 0.5–1.3)
EGFR: 128 ML/MIN/1.73M2 — SIGNIFICANT CHANGE UP
EOSINOPHIL # BLD AUTO: 0 K/UL — SIGNIFICANT CHANGE UP (ref 0–0.5)
EOSINOPHIL NFR BLD AUTO: 0 % — SIGNIFICANT CHANGE UP (ref 0–6)
GLUCOSE SERPL-MCNC: 156 MG/DL — HIGH (ref 70–99)
HCT VFR BLD CALC: 25.1 % — LOW (ref 34.5–45)
HGB BLD-MCNC: 7.7 G/DL — LOW (ref 11.5–15.5)
IMM GRANULOCYTES NFR BLD AUTO: 1.4 % — HIGH (ref 0–0.9)
INR BLD: 1.06 RATIO — SIGNIFICANT CHANGE UP (ref 0.85–1.16)
LYMPHOCYTES # BLD AUTO: 0.53 K/UL — LOW (ref 1–3.3)
LYMPHOCYTES # BLD AUTO: 4.6 % — LOW (ref 13–44)
MCHC RBC-ENTMCNC: 30 PG — SIGNIFICANT CHANGE UP (ref 27–34)
MCHC RBC-ENTMCNC: 30.7 G/DL — LOW (ref 32–36)
MCV RBC AUTO: 97.7 FL — SIGNIFICANT CHANGE UP (ref 80–100)
MONOCYTES # BLD AUTO: 0.7 K/UL — SIGNIFICANT CHANGE UP (ref 0–0.9)
MONOCYTES NFR BLD AUTO: 6.1 % — SIGNIFICANT CHANGE UP (ref 2–14)
NEUTROPHILS # BLD AUTO: 10.16 K/UL — HIGH (ref 1.8–7.4)
NEUTROPHILS NFR BLD AUTO: 87.8 % — HIGH (ref 43–77)
PLATELET # BLD AUTO: 139 K/UL — LOW (ref 150–400)
POTASSIUM SERPL-MCNC: 4.1 MMOL/L — SIGNIFICANT CHANGE UP (ref 3.5–5.3)
POTASSIUM SERPL-SCNC: 4.1 MMOL/L — SIGNIFICANT CHANGE UP (ref 3.5–5.3)
PROT SERPL-MCNC: 5.2 G/DL — LOW (ref 6.6–8.7)
PROTHROM AB SERPL-ACNC: 12.3 SEC — SIGNIFICANT CHANGE UP (ref 9.9–13.4)
RBC # BLD: 2.57 M/UL — LOW (ref 3.8–5.2)
RBC # FLD: 18.8 % — HIGH (ref 10.3–14.5)
SODIUM SERPL-SCNC: 139 MMOL/L — SIGNIFICANT CHANGE UP (ref 135–145)
WBC # BLD: 11.56 K/UL — HIGH (ref 3.8–10.5)
WBC # FLD AUTO: 11.56 K/UL — HIGH (ref 3.8–10.5)

## 2024-12-06 PROCEDURE — 85025 COMPLETE CBC W/AUTO DIFF WBC: CPT

## 2024-12-06 PROCEDURE — 96374 THER/PROPH/DIAG INJ IV PUSH: CPT

## 2024-12-06 PROCEDURE — 71045 X-RAY EXAM CHEST 1 VIEW: CPT

## 2024-12-06 PROCEDURE — 36415 COLL VENOUS BLD VENIPUNCTURE: CPT

## 2024-12-06 PROCEDURE — 86922 COMPATIBILITY TEST ANTIGLOB: CPT

## 2024-12-06 PROCEDURE — 86900 BLOOD TYPING SEROLOGIC ABO: CPT

## 2024-12-06 PROCEDURE — 86850 RBC ANTIBODY SCREEN: CPT

## 2024-12-06 PROCEDURE — 96376 TX/PRO/DX INJ SAME DRUG ADON: CPT

## 2024-12-06 PROCEDURE — 71045 X-RAY EXAM CHEST 1 VIEW: CPT | Mod: 26

## 2024-12-06 PROCEDURE — 99285 EMERGENCY DEPT VISIT HI MDM: CPT | Mod: 25

## 2024-12-06 PROCEDURE — 85730 THROMBOPLASTIN TIME PARTIAL: CPT

## 2024-12-06 PROCEDURE — 36430 TRANSFUSION BLD/BLD COMPNT: CPT

## 2024-12-06 PROCEDURE — 80053 COMPREHEN METABOLIC PANEL: CPT

## 2024-12-06 PROCEDURE — P9040: CPT

## 2024-12-06 PROCEDURE — 86901 BLOOD TYPING SEROLOGIC RH(D): CPT

## 2024-12-06 PROCEDURE — 85610 PROTHROMBIN TIME: CPT

## 2024-12-06 PROCEDURE — 99285 EMERGENCY DEPT VISIT HI MDM: CPT

## 2024-12-06 RX ORDER — HEPARIN SODIUM,PORCINE/PF 100/ML (1)
300 VIAL (ML) INTRAVENOUS ONCE
Refills: 0 | Status: COMPLETED | OUTPATIENT
Start: 2024-12-06 | End: 2024-12-06

## 2024-12-06 RX ORDER — HYDROMORPHONE HYDROCHLORIDE 2 MG/1
1 TABLET ORAL ONCE
Refills: 0 | Status: DISCONTINUED | OUTPATIENT
Start: 2024-12-06 | End: 2024-12-06

## 2024-12-06 RX ORDER — HYDROMORPHONE HYDROCHLORIDE 2 MG/1
4 TABLET ORAL ONCE
Refills: 0 | Status: DISCONTINUED | OUTPATIENT
Start: 2024-12-06 | End: 2024-12-06

## 2024-12-06 RX ADMIN — Medication 300 UNIT(S): at 21:11

## 2024-12-06 RX ADMIN — HYDROMORPHONE HYDROCHLORIDE 1 MILLIGRAM(S): 2 TABLET ORAL at 11:19

## 2024-12-06 RX ADMIN — HYDROMORPHONE HYDROCHLORIDE 1 MILLIGRAM(S): 2 TABLET ORAL at 14:41

## 2024-12-06 RX ADMIN — HYDROMORPHONE HYDROCHLORIDE 4 MILLIGRAM(S): 2 TABLET ORAL at 21:11

## 2024-12-06 NOTE — ED ADULT NURSE REASSESSMENT NOTE - NS ED NURSE REASSESS COMMENT FT1
Port accessed by Licha FIERRO. Pt tolerated well. Blood return present,. Flushing without difficulty

## 2024-12-06 NOTE — ED PROVIDER NOTE - PATIENT PORTAL LINK FT
You can access the FollowMyHealth Patient Portal offered by [facility] by registering at the following website: [email or web address]. By joining [employer]’s FollowMyHealth portal, you will also be able to view your health information using other applications (apps) compatible with our system.

## 2024-12-06 NOTE — ED ADULT NURSE NOTE - NSFALLUNIVINTERV_ED_ALL_ED
Bed/Stretcher in lowest position, wheels locked, appropriate side rails in place/Call bell, personal items and telephone in reach/Instruct patient to call for assistance before getting out of bed/chair/stretcher/Non-slip footwear applied when patient is off stretcher/[place] to call system/Physically safe environment - no spills, clutter or unnecessary equipment/Purposeful proactive rounding/Room/bathroom lighting operational, light cord in reach

## 2024-12-06 NOTE — ED ADULT NURSE NOTE - OBJECTIVE STATEMENT
Pt c/o low hgb. Was told to come to ED if symptomatic. States last night she began having abd pain and cramping, weakness, and dark stools. H/x of cancer. Denies fevers, chills, cp, sob, n.v.d

## 2024-12-06 NOTE — ED PROVIDER NOTE - CLINICAL SUMMARY MEDICAL DECISION MAKING FREE TEXT BOX
40 yo F with pmh of metastatic breast cancer ER/ND Neg, HER-2 pos on chemotherapy (mets to lung, liver, spleen, spine, bone and brain) presenting with bloody stools x 1 day in setting of Hgb 7.8 yesterday. Last transfusion was aobut 2 weeks ago. On exam pt HDS, breath sounds clear bilaterally, RRR, abdomen soft, tender to epigastric region and LLQ, no lower extremity pitting edema. Pt with GI bleed, ordered Type and screen, coags, basic labs, dilaudid.

## 2024-12-06 NOTE — ED ADULT NURSE REASSESSMENT NOTE - NS ED NURSE REASSESS COMMENT FT1
Assumed care of pt from HE RN at 1930. Pt complaining of chronic pain 8/10, hx cancer. MD Olivas made aware. Pt is A&O4. Respirations are even and unlabored. NSR on cardiac monitor. Pt receiving 2nd unit of PRBCs at this time. Plan on going.

## 2024-12-06 NOTE — ED PROVIDER NOTE - NSFOLLOWUPINSTRUCTIONS_ED_ALL_ED_FT
pt with history of metastatic breast ca with blood in stool; pt with multiple work up and transfusion;   labs, transfuse and follow up with pmd

## 2024-12-06 NOTE — ED PROVIDER NOTE - OBJECTIVE STATEMENT
38 yo F with pmh of metastatic breast cancer ER/AK Neg, HER-2 pos on chemotherapy (mets to lung, liver, spleen, spine, bone and brain) presenting with bloody stools. Pt reports she was at her oncologist yesterday, hgb found to be 7.8, and her oncologist advised her to present to ED is she became symptomatic. Yesterday evening through this morning she has had multiple dark bowel movements with red bloody streaking. Pt reports nausea, lightheaded, shortness of breath, and cough. Pt Reports she has had bloody bowel movements in the past with multiple colonoscopies and endoscopies and video capsule which all have  been unremarkable.   Patient follows with Dr. Rosado for GI and is trying to get a second opinion with another healthcare system. Of note patient was prescribed steroids about 1.5 weeks ago and reports unintentional weight gain.  She denies fevers, chills, headache, changes in vision, rhinorrhea, chest pain, emesis, hematuria, dysuria, constipation, paresthesias, weight loss.

## 2024-12-06 NOTE — ED ADULT TRIAGE NOTE - WEIGHT IN KG
70 Abbe Flap (Lower To Upper Lip) Text: The defect of the upper lip was assessed and measured.  Given the location and size of the defect, an Abbe flap was deemed most appropriate.  Using a sterile surgical marker, an appropriate Abbe flap was measured and drawn on the lower lip. Local anesthesia was then infiltrated. A scalpel was then used to incise the upper lip through and through the skin, vermilion, muscle and mucosa, leaving the flap pedicled on the opposite side.  The flap was then rotated and transferred to the lower lip defect.  The flap was then sutured into place with a three layer technique, closing the orbicularis oris muscle layer with subcutaneous buried sutures, followed by a mucosal layer and an epidermal layer.

## 2024-12-06 NOTE — ED PROVIDER NOTE - PHYSICAL EXAMINATION
General: NAD, well appearing, pale appearing   HEENT: Normocephalic, atraumatic, PERRL  Neck: No apparent stiffness or JVD  Pulm: Chest wall symmetric and nontender, lungs clear to ascultation, no wheezes, rhales, GEE  Cardiac: Regular rate and regular rhythm, 2+ radial pulses bilaterally  Abdomen: Soft, tender to epigastric region, LLQ, distended  Skin: Skin is warm, dry and intact without rashes or lesions.  Neuro: No motor or sensory deficits above reported baseline  MSK: No deformity or tenderness above reported baseline, no LE pitting edema

## 2024-12-06 NOTE — ED ADULT NURSE REASSESSMENT NOTE - NS ED NURSE REASSESS COMMENT FT1
Blood transfusion completed at 2025. VSS at this time. Patient tolerated transfusion well without any reaction noted. Patient verbalized understanding of blood transfusion and that S/S of reaction can occur up to 24 hours following a transfusion and to notify provider if there are any changes in patient's condition. Right arm;

## 2024-12-06 NOTE — ED ADULT TRIAGE NOTE - CHIEF COMPLAINT QUOTE
presents to ed from md office for low hemoglobin with generalized weakness, pale in appearance and nausea and abdominal pain with black tarry stools

## 2024-12-06 NOTE — ED PROVIDER NOTE - ATTENDING CONTRIBUTION TO CARE
40 yo F with pmh of metastatic breast cancer ER/[place] Neg, HER-2 pos on chemotherapy (mets to lung, liver, spleen, spine, bone and brain) presenting with bloody stools. Pt reports she was at her oncologist yesterday, hgb found to be 7.8, and her oncologist advised her to present to ED is she became symptomatic pe awake alert heent ncat neck supple cor s1s2 lungs clear abd soft nontender neuro nonfocal dx anemia; gi bleed

## 2024-12-09 NOTE — ED POST DISCHARGE NOTE - RESULT SUMMARY
SINTIA infiltrate. h/o metastatic breast CA. left voicemail to call back for results. certified letter sent

## 2024-12-13 ENCOUNTER — EMERGENCY (EMERGENCY)
Facility: HOSPITAL | Age: 39
LOS: 1 days | Discharge: HOME | End: 2024-12-13
Attending: STUDENT IN AN ORGANIZED HEALTH CARE EDUCATION/TRAINING PROGRAM
Payer: MEDICARE

## 2024-12-13 VITALS
RESPIRATION RATE: 18 BRPM | TEMPERATURE: 98 F | OXYGEN SATURATION: 98 % | DIASTOLIC BLOOD PRESSURE: 79 MMHG | SYSTOLIC BLOOD PRESSURE: 129 MMHG | HEART RATE: 56 BPM

## 2024-12-13 VITALS
SYSTOLIC BLOOD PRESSURE: 128 MMHG | RESPIRATION RATE: 18 BRPM | OXYGEN SATURATION: 98 % | HEART RATE: 69 BPM | TEMPERATURE: 98 F | DIASTOLIC BLOOD PRESSURE: 88 MMHG | WEIGHT: 147.93 LBS | HEIGHT: 62 IN

## 2024-12-13 DIAGNOSIS — Z98.890 OTHER SPECIFIED POSTPROCEDURAL STATES: Chronic | ICD-10-CM

## 2024-12-13 DIAGNOSIS — Z90.89 ACQUIRED ABSENCE OF OTHER ORGANS: Chronic | ICD-10-CM

## 2024-12-13 PROCEDURE — 72125 CT NECK SPINE W/O DYE: CPT | Mod: MC

## 2024-12-13 PROCEDURE — 93010 ELECTROCARDIOGRAM REPORT: CPT

## 2024-12-13 PROCEDURE — 72125 CT NECK SPINE W/O DYE: CPT | Mod: 26,MC

## 2024-12-13 PROCEDURE — 71046 X-RAY EXAM CHEST 2 VIEWS: CPT

## 2024-12-13 PROCEDURE — 82962 GLUCOSE BLOOD TEST: CPT

## 2024-12-13 PROCEDURE — 70450 CT HEAD/BRAIN W/O DYE: CPT | Mod: MC

## 2024-12-13 PROCEDURE — 99285 EMERGENCY DEPT VISIT HI MDM: CPT

## 2024-12-13 PROCEDURE — 99284 EMERGENCY DEPT VISIT MOD MDM: CPT | Mod: 25

## 2024-12-13 PROCEDURE — 70486 CT MAXILLOFACIAL W/O DYE: CPT | Mod: MC

## 2024-12-13 PROCEDURE — 70486 CT MAXILLOFACIAL W/O DYE: CPT | Mod: 26,MC

## 2024-12-13 PROCEDURE — 99282 EMERGENCY DEPT VISIT SF MDM: CPT

## 2024-12-13 PROCEDURE — 70450 CT HEAD/BRAIN W/O DYE: CPT | Mod: 26,MC

## 2024-12-13 PROCEDURE — 71046 X-RAY EXAM CHEST 2 VIEWS: CPT | Mod: 26

## 2024-12-13 PROCEDURE — 93005 ELECTROCARDIOGRAM TRACING: CPT

## 2024-12-13 RX ORDER — IBUPROFEN 200 MG
600 TABLET ORAL ONCE
Refills: 0 | Status: COMPLETED | OUTPATIENT
Start: 2024-12-13 | End: 2024-12-13

## 2024-12-13 RX ADMIN — Medication 600 MILLIGRAM(S): at 17:38

## 2024-12-13 NOTE — ED PROVIDER NOTE - PROGRESS NOTE DETAILS
CT head/neck/face neg for acute pathology or fractures. CXR negative for any acute processes. Pain control given in ED. Pt stable for discharge - will followup outpatient, return precautions given .

## 2024-12-13 NOTE — ED ADULT TRIAGE NOTE - CHIEF COMPLAINT QUOTE
pt states she tripped over a wooden step onto L side of face. pt unknown if she got dizzy or synopsized prior to falling. pt admits to head strike, no LOC. hx of CA on chemo

## 2024-12-13 NOTE — ED PROVIDER NOTE - ATTENDING CONTRIBUTION TO CARE
I have personally performed a history and physical examination of the patient and discussed management with the resident as well as the patient.  I reviewed the resident's note and agree with the documented findings and plan of care.  I have authored and modified critical sections of the Provider Note, including but not limited to HPI, Physical Exam and MDM.    HPI: 39-year-old female past medical history of metastatic breast cancer ER/[place] negative, HER2 positive on chemotherapy with metastasis to the lung, liver, spleen, spine, bone, and brain presents status post fall where the patient tripped over a wooden step onto the left side of her face.  Denies LOC however complaining of left-sided facial pain.  Denies any vision changes.  States she bit the inside of her left cheek which was initially bleeding but has since stopped at that time.  Patient also endorsing 2 weeks of cough with clear/yellow sputum production which she is following with her oncologist and primary care doctor closely, and states that given frequent use of antibiotics in the past for infections has been told to hold off on antibiotics until her follow-up appointment in 3 days.  Patient denies fevers or difficulty breathing.  No other current complaints this time.    ROS:   General: See HPI  ENT: No earache, no coryza, no sore throat  Neck: No stiffness, no swollen glands, no dysphagia  Respiratory: No cough, no dyspnea, no pleuritic chest pain  Cardiac: no chest pain, no palpitations, no edema, no jvd  Musculoskeletal: No myalgia, no arthralgia  Neurologic: No headache, no vertigo, no paresthesia, no focal deficits, no diplopia  Skin: See HPI  All other ROS are negative    PE:  General: NAD; well appearing; A&O x3   Head: Erythema with trace ecchymosis of the left midface with TTP over the zygomatic arch  Eyes: PERRL, EOMI  ENT: Airway patent, mmm, Oral cavity and pharynx normal. No inflammation, swelling, exudate, or lesions. Neck: Neck supple, non-tender without lymphadenopathy, no masses.  Pulmonary: CTA b/l, symmetric breath sounds. No W/R/R.  Cardiac: s1s2, RRR, no M,G,R, No JVD  Back: Normal  spine  Extremities: FROM, symmetric pulses, capillary refill < 2 seconds, no edema, 5/5 strength in b/l UE and LE  Skin: As above, otherwise no rash or bruising throughout  Neurologic: alert, speech clear, no focal deficits, CN II-XII grossly intact, normal gait, sensation grossly intact  Psych: nl mood/affect, nl insight.    MDM: 39-year-old female past medical history of metastatic breast cancer ER/[place] negative, HER2 positive on chemotherapy with metastasis to the lung, liver, spleen, spine, bone, and brain presents status post fall where the patient tripped over a wooden step onto the left side of her face.  Patient also endorsing 2 weeks of cough with clear/yellow sputum production which she is following with her oncologist and primary care doctor closely, and states that given frequent use of antibiotics in the past for infections has been told to hold off on antibiotics until her follow-up appointment in 3 days.  GCS 15 on examination.  Will obtain CT head and max face for low suspicion fracture versus ICH/TBI.  No maxillofacial disruption on examination.  Patient complaining of chest pressure similar over the last 1 week from excessive coughing.  Likely costochondritis.  Will obtain CXR to screen for PNA and provide symptomatic control as needed.  Will obtain screening EKG.  Disposition pending results.

## 2024-12-13 NOTE — ED ADULT NURSE NOTE - OBJECTIVE STATEMENT
A&Ox3, pt states she was wrapping presents and  got dizzy or synopsized prior to falling, unsure.  pt admits to head strike, unsure of LOC. hx of CA on chemo. Also had a cough x 1 week and reports sternal chest pain. Breathing is spontaneous even and unlabored. Denies SOB, vomiting, diarrhea. Bed is in lowest position and side rails up. Safety precautions maintained. . Connected to cardiac monitor and pulse ox.

## 2024-12-13 NOTE — ED PROVIDER NOTE - PATIENT PORTAL LINK FT
You can access the FollowMyHealth Patient Portal offered by [facility] by registering at the following website: [email or web address]. By joining [employer]’s FollowMyHealth portal, you will also be able to view your health information using other applications (apps) compatible with our system.

## 2024-12-28 ENCOUNTER — EMERGENCY (EMERGENCY)
Facility: HOSPITAL | Age: 39
LOS: 1 days | Discharge: HOME | End: 2024-12-28
Attending: STUDENT IN AN ORGANIZED HEALTH CARE EDUCATION/TRAINING PROGRAM
Payer: MEDICARE

## 2024-12-28 VITALS
TEMPERATURE: 98 F | HEIGHT: 62 IN | HEART RATE: 88 BPM | DIASTOLIC BLOOD PRESSURE: 79 MMHG | WEIGHT: 147.93 LBS | SYSTOLIC BLOOD PRESSURE: 122 MMHG | OXYGEN SATURATION: 98 % | RESPIRATION RATE: 18 BRPM

## 2024-12-28 DIAGNOSIS — Z98.890 OTHER SPECIFIED POSTPROCEDURAL STATES: Chronic | ICD-10-CM

## 2024-12-28 DIAGNOSIS — Z90.89 ACQUIRED ABSENCE OF OTHER ORGANS: Chronic | ICD-10-CM

## 2024-12-28 LAB
ALBUMIN SERPL ELPH-MCNC: 3.2 G/DL — LOW (ref 3.3–5.2)
ALP SERPL-CCNC: 178 U/L — HIGH (ref 40–120)
ALT FLD-CCNC: 74 U/L — HIGH
ANION GAP SERPL CALC-SCNC: 13 MMOL/L — SIGNIFICANT CHANGE UP (ref 5–17)
ANISOCYTOSIS BLD QL: SIGNIFICANT CHANGE UP
APTT BLD: 28 SEC — SIGNIFICANT CHANGE UP (ref 24.5–35.6)
AST SERPL-CCNC: 83 U/L — HIGH
BASOPHILS # BLD AUTO: 0.05 K/UL — SIGNIFICANT CHANGE UP (ref 0–0.2)
BASOPHILS NFR BLD AUTO: 0.9 % — SIGNIFICANT CHANGE UP (ref 0–2)
BILIRUB SERPL-MCNC: 1 MG/DL — SIGNIFICANT CHANGE UP (ref 0.4–2)
BUN SERPL-MCNC: 18.1 MG/DL — SIGNIFICANT CHANGE UP (ref 8–20)
CALCIUM SERPL-MCNC: 8 MG/DL — LOW (ref 8.4–10.5)
CHLORIDE SERPL-SCNC: 100 MMOL/L — SIGNIFICANT CHANGE UP (ref 96–108)
CO2 SERPL-SCNC: 24 MMOL/L — SIGNIFICANT CHANGE UP (ref 22–29)
CREAT SERPL-MCNC: 0.49 MG/DL — LOW (ref 0.5–1.3)
DACRYOCYTES BLD QL SMEAR: SLIGHT — SIGNIFICANT CHANGE UP
EGFR: 124 ML/MIN/1.73M2 — SIGNIFICANT CHANGE UP
EGFR: 124 ML/MIN/1.73M2 — SIGNIFICANT CHANGE UP
EOSINOPHIL # BLD AUTO: 0 K/UL — SIGNIFICANT CHANGE UP (ref 0–0.5)
EOSINOPHIL NFR BLD AUTO: 0 % — SIGNIFICANT CHANGE UP (ref 0–6)
GIANT PLATELETS BLD QL SMEAR: PRESENT — SIGNIFICANT CHANGE UP
GLUCOSE SERPL-MCNC: 205 MG/DL — HIGH (ref 70–99)
HCT VFR BLD CALC: 29.5 % — LOW (ref 34.5–45)
HGB BLD-MCNC: 9 G/DL — LOW (ref 11.5–15.5)
INR BLD: 1.12 RATIO — SIGNIFICANT CHANGE UP (ref 0.85–1.16)
LYMPHOCYTES # BLD AUTO: 0.15 K/UL — LOW (ref 1–3.3)
LYMPHOCYTES # BLD AUTO: 2.7 % — LOW (ref 13–44)
MACROCYTES BLD QL: SIGNIFICANT CHANGE UP
MANUAL SMEAR VERIFICATION: SIGNIFICANT CHANGE UP
MCHC RBC-ENTMCNC: 30.5 G/DL — LOW (ref 32–36)
MCHC RBC-ENTMCNC: 31.6 PG — SIGNIFICANT CHANGE UP (ref 27–34)
MCV RBC AUTO: 103.5 FL — HIGH (ref 80–100)
MONOCYTES # BLD AUTO: 0.35 K/UL — SIGNIFICANT CHANGE UP (ref 0–0.9)
MONOCYTES NFR BLD AUTO: 6.2 % — SIGNIFICANT CHANGE UP (ref 2–14)
NEUTROPHILS # BLD AUTO: 5.02 K/UL — SIGNIFICANT CHANGE UP (ref 1.8–7.4)
NEUTROPHILS NFR BLD AUTO: 88.4 % — HIGH (ref 43–77)
NEUTS BAND # BLD: 1.8 % — SIGNIFICANT CHANGE UP (ref 0–8)
NEUTS BAND NFR BLD: 1.8 % — SIGNIFICANT CHANGE UP (ref 0–8)
NRBC # BLD: 7 /100 WBCS — HIGH (ref 0–0)
NRBC BLD-RTO: 7 /100 WBCS — HIGH (ref 0–0)
PLAT MORPH BLD: NORMAL — SIGNIFICANT CHANGE UP
PLATELET # BLD AUTO: 127 K/UL — LOW (ref 150–400)
POIKILOCYTOSIS BLD QL AUTO: SLIGHT — SIGNIFICANT CHANGE UP
POLYCHROMASIA BLD QL SMEAR: SLIGHT — SIGNIFICANT CHANGE UP
POTASSIUM SERPL-MCNC: 4.9 MMOL/L — SIGNIFICANT CHANGE UP (ref 3.5–5.3)
POTASSIUM SERPL-SCNC: 4.9 MMOL/L — SIGNIFICANT CHANGE UP (ref 3.5–5.3)
PROT SERPL-MCNC: 5.5 G/DL — LOW (ref 6.6–8.7)
PROTHROM AB SERPL-ACNC: 12.6 SEC — SIGNIFICANT CHANGE UP (ref 9.9–13.4)
RBC # BLD: 2.85 M/UL — LOW (ref 3.8–5.2)
RBC # FLD: 24.7 % — HIGH (ref 10.3–14.5)
RBC BLD AUTO: ABNORMAL
SMUDGE CELLS # BLD: PRESENT — SIGNIFICANT CHANGE UP
SODIUM SERPL-SCNC: 137 MMOL/L — SIGNIFICANT CHANGE UP (ref 135–145)
WBC # BLD: 5.57 K/UL — SIGNIFICANT CHANGE UP (ref 3.8–10.5)
WBC # FLD AUTO: 5.57 K/UL — SIGNIFICANT CHANGE UP (ref 3.8–10.5)

## 2024-12-28 RX ORDER — ONDANSETRON HCL/PF 4 MG/2 ML
4 VIAL (ML) INJECTION ONCE
Refills: 0 | Status: COMPLETED | OUTPATIENT
Start: 2024-12-28 | End: 2024-12-28

## 2024-12-28 RX ADMIN — Medication 4 MILLIGRAM(S): at 21:37

## 2024-12-28 RX ADMIN — Medication 4 MILLIGRAM(S): at 22:52

## 2024-12-28 RX ADMIN — Medication 1000 MILLILITER(S): at 21:36

## 2024-12-29 LAB
BASE EXCESS BLDV CALC-SCNC: 2.5 MMOL/L — SIGNIFICANT CHANGE UP (ref -2–3)
HCO3 BLDV-SCNC: 26 MMOL/L — SIGNIFICANT CHANGE UP (ref 22–29)
PCO2 BLDV: 37 MMHG — LOW (ref 39–42)
PH BLDV: 7.46 — HIGH (ref 7.32–7.43)
PO2 BLDV: 214 MMHG — HIGH (ref 25–45)
SAO2 % BLDV: 98.9 % — SIGNIFICANT CHANGE UP

## 2024-12-29 RX ORDER — ONDANSETRON HCL/PF 4 MG/2 ML
4 VIAL (ML) INJECTION ONCE
Refills: 0 | Status: COMPLETED | OUTPATIENT
Start: 2024-12-29 | End: 2024-12-29

## 2024-12-29 RX ORDER — ACETAMINOPHEN 500 MG/5ML
1000 LIQUID (ML) ORAL ONCE
Refills: 0 | Status: COMPLETED | OUTPATIENT
Start: 2024-12-29 | End: 2024-12-29

## 2024-12-29 RX ADMIN — Medication 400 MILLIGRAM(S): at 04:13

## 2024-12-29 RX ADMIN — Medication 4 MILLIGRAM(S): at 01:00

## 2025-01-02 ENCOUNTER — EMERGENCY (EMERGENCY)
Facility: HOSPITAL | Age: 40
LOS: 1 days | Discharge: HOME | End: 2025-01-02
Attending: EMERGENCY MEDICINE
Payer: MEDICARE

## 2025-01-02 VITALS
RESPIRATION RATE: 16 BRPM | WEIGHT: 147.93 LBS | SYSTOLIC BLOOD PRESSURE: 103 MMHG | TEMPERATURE: 99 F | DIASTOLIC BLOOD PRESSURE: 61 MMHG | HEART RATE: 103 BPM | HEIGHT: 62 IN | OXYGEN SATURATION: 96 %

## 2025-01-02 DIAGNOSIS — Z98.890 OTHER SPECIFIED POSTPROCEDURAL STATES: Chronic | ICD-10-CM

## 2025-01-02 DIAGNOSIS — Z90.89 ACQUIRED ABSENCE OF OTHER ORGANS: Chronic | ICD-10-CM

## 2025-01-02 LAB
ALBUMIN SERPL ELPH-MCNC: 2.5 G/DL — LOW (ref 3.3–5.2)
ALP SERPL-CCNC: 232 U/L — HIGH (ref 40–120)
ALT FLD-CCNC: 36 U/L — HIGH
ANION GAP SERPL CALC-SCNC: 13 MMOL/L — SIGNIFICANT CHANGE UP (ref 5–17)
ANISOCYTOSIS BLD QL: SIGNIFICANT CHANGE UP
APTT BLD: 40.1 SEC — HIGH (ref 24.5–35.6)
AST SERPL-CCNC: 44 U/L — HIGH
BASOPHILS # BLD AUTO: 0 K/UL — SIGNIFICANT CHANGE UP (ref 0–0.2)
BASOPHILS NFR BLD AUTO: 0 % — SIGNIFICANT CHANGE UP (ref 0–2)
BILIRUB SERPL-MCNC: 0.6 MG/DL — SIGNIFICANT CHANGE UP (ref 0.4–2)
BLD GP AB SCN SERPL QL: SIGNIFICANT CHANGE UP
BUN SERPL-MCNC: 12.4 MG/DL — SIGNIFICANT CHANGE UP (ref 8–20)
CALCIUM SERPL-MCNC: 7.1 MG/DL — LOW (ref 8.4–10.5)
CHLORIDE SERPL-SCNC: 103 MMOL/L — SIGNIFICANT CHANGE UP (ref 96–108)
CO2 SERPL-SCNC: 22 MMOL/L — SIGNIFICANT CHANGE UP (ref 22–29)
CREAT SERPL-MCNC: 0.39 MG/DL — LOW (ref 0.5–1.3)
DACRYOCYTES BLD QL SMEAR: SIGNIFICANT CHANGE UP
EGFR: 131 ML/MIN/1.73M2 — SIGNIFICANT CHANGE UP
EOSINOPHIL # BLD AUTO: 0 K/UL — SIGNIFICANT CHANGE UP (ref 0–0.5)
EOSINOPHIL NFR BLD AUTO: 0 % — SIGNIFICANT CHANGE UP (ref 0–6)
FLUAV AG NPH QL: DETECTED
FLUBV AG NPH QL: SIGNIFICANT CHANGE UP
GIANT PLATELETS BLD QL SMEAR: PRESENT — SIGNIFICANT CHANGE UP
GLUCOSE SERPL-MCNC: 147 MG/DL — HIGH (ref 70–99)
HCT VFR BLD CALC: 17.1 % — CRITICAL LOW (ref 34.5–45)
HGB BLD-MCNC: 5 G/DL — CRITICAL LOW (ref 11.5–15.5)
HYPOCHROMIA BLD QL: SIGNIFICANT CHANGE UP
INR BLD: 1.25 RATIO — HIGH (ref 0.85–1.16)
LIDOCAIN IGE QN: 11 U/L — LOW (ref 22–51)
LYMPHOCYTES # BLD AUTO: 0.22 K/UL — LOW (ref 1–3.3)
LYMPHOCYTES # BLD AUTO: 4.6 % — LOW (ref 13–44)
MACROCYTES BLD QL: SIGNIFICANT CHANGE UP
MANUAL SMEAR VERIFICATION: SIGNIFICANT CHANGE UP
MCHC RBC-ENTMCNC: 29.2 G/DL — LOW (ref 32–36)
MCHC RBC-ENTMCNC: 31.1 PG — SIGNIFICANT CHANGE UP (ref 27–34)
MCV RBC AUTO: 106.2 FL — HIGH (ref 80–100)
METAMYELOCYTES # FLD: 0.9 % — HIGH (ref 0–0)
MONOCYTES # BLD AUTO: 0.18 K/UL — SIGNIFICANT CHANGE UP (ref 0–0.9)
MONOCYTES NFR BLD AUTO: 3.7 % — SIGNIFICANT CHANGE UP (ref 2–14)
NEUTROPHILS # BLD AUTO: 4.41 K/UL — SIGNIFICANT CHANGE UP (ref 1.8–7.4)
NEUTROPHILS NFR BLD AUTO: 88 % — HIGH (ref 43–77)
NEUTS BAND # BLD: 2.8 % — SIGNIFICANT CHANGE UP (ref 0–8)
NRBC # BLD: 7 /100 WBCS — HIGH (ref 0–0)
PLAT MORPH BLD: NORMAL — SIGNIFICANT CHANGE UP
PLATELET # BLD AUTO: 83 K/UL — LOW (ref 150–400)
POIKILOCYTOSIS BLD QL AUTO: SIGNIFICANT CHANGE UP
POLYCHROMASIA BLD QL SMEAR: SIGNIFICANT CHANGE UP
POTASSIUM SERPL-MCNC: 3.6 MMOL/L — SIGNIFICANT CHANGE UP (ref 3.5–5.3)
POTASSIUM SERPL-SCNC: 3.6 MMOL/L — SIGNIFICANT CHANGE UP (ref 3.5–5.3)
PROT SERPL-MCNC: 4.6 G/DL — LOW (ref 6.6–8.7)
PROTHROM AB SERPL-ACNC: 14.1 SEC — HIGH (ref 9.9–13.4)
RBC # BLD: 1.61 M/UL — LOW (ref 3.8–5.2)
RBC # FLD: 24.2 % — HIGH (ref 10.3–14.5)
RBC BLD AUTO: ABNORMAL
RSV RNA NPH QL NAA+NON-PROBE: SIGNIFICANT CHANGE UP
SARS-COV-2 RNA SPEC QL NAA+PROBE: SIGNIFICANT CHANGE UP
SODIUM SERPL-SCNC: 138 MMOL/L — SIGNIFICANT CHANGE UP (ref 135–145)
WBC # BLD: 4.86 K/UL — SIGNIFICANT CHANGE UP (ref 3.8–10.5)
WBC # FLD AUTO: 4.86 K/UL — SIGNIFICANT CHANGE UP (ref 3.8–10.5)

## 2025-01-02 PROCEDURE — 99223 1ST HOSP IP/OBS HIGH 75: CPT

## 2025-01-02 PROCEDURE — 71045 X-RAY EXAM CHEST 1 VIEW: CPT | Mod: 26

## 2025-01-02 RX ORDER — METHADONE HYDROCHLORIDE 10 MG/1
10 TABLET ORAL AT BEDTIME
Refills: 0 | Status: COMPLETED | OUTPATIENT
Start: 2025-01-02 | End: 2025-01-09

## 2025-01-02 RX ORDER — ONDANSETRON 4 MG/1
4 TABLET ORAL EVERY 6 HOURS
Refills: 0 | Status: ACTIVE | OUTPATIENT
Start: 2025-01-02 | End: 2025-12-01

## 2025-01-02 RX ORDER — METHADONE HYDROCHLORIDE 10 MG/1
5 TABLET ORAL ONCE
Refills: 0 | Status: DISCONTINUED | OUTPATIENT
Start: 2025-01-02 | End: 2025-01-03

## 2025-01-02 RX ORDER — OXYCODONE HCL 15 MG
30 TABLET ORAL ONCE
Refills: 0 | Status: DISCONTINUED | OUTPATIENT
Start: 2025-01-02 | End: 2025-01-02

## 2025-01-02 RX ORDER — ONDANSETRON 4 MG/1
4 TABLET ORAL ONCE
Refills: 0 | Status: COMPLETED | OUTPATIENT
Start: 2025-01-02 | End: 2025-01-02

## 2025-01-02 RX ORDER — HYDROMORPHONE HCL 4 MG
0.5 TABLET ORAL ONCE
Refills: 0 | Status: DISCONTINUED | OUTPATIENT
Start: 2025-01-02 | End: 2025-01-02

## 2025-01-02 RX ORDER — HYDROMORPHONE HCL 4 MG
1 TABLET ORAL EVERY 4 HOURS
Refills: 0 | Status: DISCONTINUED | OUTPATIENT
Start: 2025-01-02 | End: 2025-01-02

## 2025-01-02 RX ORDER — HYDROMORPHONE HCL 4 MG
2 TABLET ORAL EVERY 4 HOURS
Refills: 0 | Status: DISCONTINUED | OUTPATIENT
Start: 2025-01-02 | End: 2025-01-03

## 2025-01-02 RX ADMIN — Medication 0.5 MILLIGRAM(S): at 19:17

## 2025-01-02 RX ADMIN — ONDANSETRON 4 MILLIGRAM(S): 4 TABLET ORAL at 20:06

## 2025-01-02 RX ADMIN — Medication 0.5 MILLIGRAM(S): at 20:06

## 2025-01-02 NOTE — ED ADULT NURSE NOTE - OBJECTIVE STATEMENT
alert and oriented x4. pt presenting to the ED complaining of alert and oriented x4. pt presenting to the ED complaining of abnormal labs from nycb s/p iron infusion. PMH breast ca with mets to brain. Pt states "I have a hbg of 5" pt also stating she feels weak, nauseas  and complaining of shortness of breath. Pt RR even and non labored. placed on continuous cardiac monitoring nsrcpo wdl on ra. MD Ochoa at bedside,

## 2025-01-02 NOTE — ED PROVIDER NOTE - OBJECTIVE STATEMENT
Pt is a 38 yo female with PMH of metastatic breast ca on chemoinfusion and chemo po meds presenting with anemia. Pt reports that she had recent bloodwork that showed hb 5.5 which she was notified of today by Dr. Lara Brooks cancer and blood. pt reports of abdominal pain, back pain. SOB, vomiting and diarrhea for the past few days. Pt reports of black diarrhea for the past few days, pt's family at home is sick with gi and respiratory symptoms as well.

## 2025-01-02 NOTE — ED PROVIDER NOTE - ATTENDING CONTRIBUTION TO CARE
I, Ray Espinoza MD, personally saw the patient with the resident, and completed the key components of the history and physical exam. I then discussed the management plan with the resident.  Patient with a past medical history of metastatic breast cancer is presenting with complaints of anemia.  Patient had lab work done earlier that was concerning for anemia so patient advised to come to ED for evaluation.  She reports a history of this in the past from chronic GI bleed.  No changes in her stool caliber recently though states that it is typically dark.  No reported vomiting blood.  No fevers.  States that she has chronic abdominal pain that is unchanged today.  On exam she has lower abdominal tenderness palpation.  She is afebrile.  She is chronically ill-appearing.  Concern for anemia from her underlying disease versus possible GI bleeding.  Plan on lab work, transfusion as needed and possible admission.    Upon my evaluation, this patient has a high probability of imminent or life-threatening deterioration which required my direct attention, intervention and personal management.  I have personally provided the amount of critical care time documented above excluding time spent on separate procedures. Elements for critical care include direct patient care (not related to procedure), additional history taking, interpretation of diagnostic studies, documentation, consultation with other physicians.

## 2025-01-02 NOTE — ED ADULT NURSE NOTE - HOW PATIENT ADDRESSED, PROFILE
Pa Blanca <[email or web address]>    Good morning Anitha Blanca missed her 4/27 appointment with Dr. Templeton and has rescheduled to May 11.    Thanks and have a great weekend!    Winnie Rashid, RN, BSN  Work Comp Nurse   Occupational Health  [facility]  Ph [phone]  Fx [phone]    
Pa Blanca <[email or web address]    Good morning Anitha Blanca did not arrive for her 5/11 appointment with Dr. Templeton and has not rescheduled.      Thanks and have a great week!    Winnie Rashid, RN, BSN  Work Comp Nurse   Occupational Health  [facility]  Ph [phone]  Fx [phone]    
Thank you Rianna for the update which I’ll share w/N. Ashley GENTILE who Anitha is also scheduled to see for more visits.    Take care,    Winnie Rashid RN, BSN  Work Comp Nurse   Occupational Health  [facility]  Ph [phone]  Fx [phone]                     From: Rianna Winn <[email or web address]>   Sent: Monday, May 15, 2023 8:51 AM  To: Winnie Rashid <[email or web address]>  Subject: [EXTERNAL] RE: work comp    Attention: This email originated from outside of [facility]. Always validate the sender's email address before clicking on links or attachments as they may not be safe. Never provide your username or password to a site you do not trust. Use the Report Phish button if you think an email is suspicious.     Joel Zhou,  Thanks. I was aware of her intent to cancel this appt. as well as her next f/u with Camille SHANKS in [facility]. Ms. Salgado recently went for a second Ortho opinion at [facility] in [place]. She is scheduled for [facility] surgical repair L thumb on 05-25-23 with Dr. Severo Melvin.  Rianna Winn RN [facility] [place], [employer]     
Whitney

## 2025-01-02 NOTE — ED PROVIDER NOTE - PHYSICAL EXAMINATION
General: NAD  HEENT: Normocephalic, atraumatic  Neck: No apparent stiffness or JVD  Pulm: Chest wall symmetric and nontender, lungs clear to ascultation   Cardiac: Regular rate and regular rhythm  Abdomen: periumbilical mild tenderness and nondistended  Skin: Skin is warm, dry and intact without rashes or lesions.  Neuro: No motor or sensory deficits above reported baseline  MSK: No deformity or tenderness above reported baseline

## 2025-01-02 NOTE — ED PROVIDER NOTE - PROGRESS NOTE DETAILS
Pt has hb of 5. given 2 units of prbc. placed in obs for 2 units, will repeat hb after 2 units, if not above 7, give additional unit. Gi consult for the morning to determine if pt needs to be scoped. Pt has been scoped multiple times in the past for anemia and fecal occult positive, with no remarkable findings.

## 2025-01-02 NOTE — ED CDU PROVIDER INITIAL DAY NOTE - ATTENDING APP SHARED VISIT CONTRIBUTION OF CARE
39-year-old female past medical history of metastatic breast cancer on chemotherapy presenting for evaluation of anemia, ED lab workup showing hemoglobin of 5, went for 2 units of PRBCs.  GI consultation placed due to history of melena, pending recs.

## 2025-01-02 NOTE — ED ADULT NURSE NOTE - EXTENSIONS OF SELF_ADULT
Electroencephalography (EEG) [address] [place] [facility]        Patient:  Mika Milan Cost Procedure Date: 05/11/2022   YOB: 1992 Referring Practitioner: Dr. Edison Cook   MRN: [id number] Interpreting Physician: Dr. Ubaldo Chaudhry         HISTORY:    This is a 30 y.o. old female presenting for evaluation of syncope. REPORT:    With the patient alert, the background showed a well-developed, well-sustained alpha rhythm in the 10-11 Hz range. The background activity attenuated with eye opening. There was symmetric low voltage beta activity seen in the anterior electrodes. No focal slowing or epileptiform discharges were noted. The patient remained awake for the entire duration of the recording. Photic stimulation was performed as an activating maneuver during the recording; no abnormalities were elicited by this maneuver. Hyperventilation was not performed. No abnormalities were seen in the EKG monitoring channel. IMPRESSION:    Normal awake EEG. No focal slowing or epileptiform discharges were seen. Clinical correlation advised.     Electronically signed by: Lauren Chavarria DO 5/11/2022 12:27 PM None

## 2025-01-02 NOTE — ED ADULT NURSE REASSESSMENT NOTE - NS ED NURSE REASSESS COMMENT FT1
1unit prbcs given. Consent in chart. Risks and benefits explained to patient. Patient verbalized understanding of risks and benefits. Patient aware of possible side effects. Vital signs stable. Second RN at bedside for confirmation.

## 2025-01-02 NOTE — ED ADULT TRIAGE NOTE - CHIEF COMPLAINT QUOTE
C/O abnormal lab result. "I went to doctor today and I was found to have hemoglobin of 5". Hx of lung CA with mets. Pt states she has abdominal pain, vomiting, and diarrhea and shortness of breath.

## 2025-01-02 NOTE — ED PROVIDER NOTE - CLINICAL SUMMARY MEDICAL DECISION MAKING FREE TEXT BOX
Pt is a 40 yo female with PMH of metastatic breast ca on chemoinfusion and chemo po meds presenting with anemia. Pt reports that she had recent bloodwork that showed hb 5.5 which she was notified of today by Dr. Bermudez Ny cancer and blood. pt reports of abdominal pain, back pain. SOB, vomiting and diarrhea for the past few days. Pt reports of black diarrhea for the past few days, pt's family at home is sick with gi and respiratory symptoms as well.    vs shows mild tachy and hypotension, cbc, cmp, lipase, type and screen, coags, flu swab, dilaudid for pain, and ct abd pelvis iv contrast gi bleed protocol. Pt is a 40 yo female with PMH of metastatic breast ca on chemoinfusion and chemo po meds presenting with anemia. Pt reports that she had recent bloodwork that showed hb 5.5 which she was notified of today by Dr. Bermudez Ny cancer and blood. pt reports of abdominal pain, back pain. SOB, vomiting and diarrhea for the past few days. Pt reports of black diarrhea for the past few days, pt's family at home is sick with gi and respiratory symptoms as well.    vs shows mild tachy and hypotension, cbc, cmp, lipase, type and screen, coags, flu swab, dilaudid for pain, and ct abd pelvis iv contrast gi bleed protocol.    Pt has hb of 5. given 2 units of prbc. placed in obs for 2 units, will repeat hb after 2 units, if not above 7, give additional unit. Gi consult for the morning to determine if pt needs to be scoped. Pt has been scoped multiple times in the past for anemia and fecal occult positive, with no remarkable findings.

## 2025-01-02 NOTE — ED CDU PROVIDER INITIAL DAY NOTE - CLINICAL SUMMARY MEDICAL DECISION MAKING FREE TEXT BOX
40yo F pmh metastatic breast Ca on chemoinfusion and chemo PO meds p/w anemia. pt received call from oncologist advising her to go to ED for transfusion as hgb was 5.5. in ED labs showed hgb of 5.0. pt also c/o abdominal pain, back pain, SOB, vomiting and "dark black" diarrhea. pt reports sx are chronic. pt known to ED, has had multiple GI bleed work up, dx with GAVE. pt placed in obs under anemia protocol. pt to receive 2 units of PRBC, will rpt CBC to determine if additional transfusion needed. GI consult requested to determine if endoscopy needed.

## 2025-01-03 VITALS
SYSTOLIC BLOOD PRESSURE: 96 MMHG | DIASTOLIC BLOOD PRESSURE: 67 MMHG | RESPIRATION RATE: 18 BRPM | HEART RATE: 89 BPM | TEMPERATURE: 98 F | OXYGEN SATURATION: 95 %

## 2025-01-03 LAB
ANISOCYTOSIS BLD QL: SLIGHT — SIGNIFICANT CHANGE UP
BASOPHILS # BLD AUTO: 0 K/UL — SIGNIFICANT CHANGE UP (ref 0–0.2)
BASOPHILS NFR BLD AUTO: 0 % — SIGNIFICANT CHANGE UP (ref 0–2)
BURR CELLS BLD QL SMEAR: PRESENT — SIGNIFICANT CHANGE UP
DACRYOCYTES BLD QL SMEAR: SLIGHT — SIGNIFICANT CHANGE UP
ELLIPTOCYTES BLD QL SMEAR: SLIGHT — SIGNIFICANT CHANGE UP
EOSINOPHIL # BLD AUTO: 0 K/UL — SIGNIFICANT CHANGE UP (ref 0–0.5)
EOSINOPHIL NFR BLD AUTO: 0 % — SIGNIFICANT CHANGE UP (ref 0–6)
GIANT PLATELETS BLD QL SMEAR: PRESENT — SIGNIFICANT CHANGE UP
HCT VFR BLD CALC: 25.7 % — LOW (ref 34.5–45)
HGB BLD-MCNC: 8 G/DL — LOW (ref 11.5–15.5)
HYPOCHROMIA BLD QL: SLIGHT — SIGNIFICANT CHANGE UP
LYMPHOCYTES # BLD AUTO: 0.2 K/UL — LOW (ref 1–3.3)
LYMPHOCYTES # BLD AUTO: 2.6 % — LOW (ref 13–44)
MACROCYTES BLD QL: SLIGHT — SIGNIFICANT CHANGE UP
MANUAL SMEAR VERIFICATION: SIGNIFICANT CHANGE UP
MCHC RBC-ENTMCNC: 30.1 PG — SIGNIFICANT CHANGE UP (ref 27–34)
MCHC RBC-ENTMCNC: 31.1 G/DL — LOW (ref 32–36)
MCV RBC AUTO: 96.6 FL — SIGNIFICANT CHANGE UP (ref 80–100)
METAMYELOCYTES # FLD: 0.9 % — HIGH (ref 0–0)
MICROCYTES BLD QL: SLIGHT — SIGNIFICANT CHANGE UP
MONOCYTES # BLD AUTO: 0 K/UL — SIGNIFICANT CHANGE UP (ref 0–0.9)
MONOCYTES NFR BLD AUTO: 0 % — LOW (ref 2–14)
NEUTROPHILS # BLD AUTO: 7.41 K/UL — HIGH (ref 1.8–7.4)
NEUTROPHILS NFR BLD AUTO: 94.7 % — HIGH (ref 43–77)
NRBC # BLD: 8 /100 WBCS — HIGH (ref 0–0)
OVALOCYTES BLD QL SMEAR: SLIGHT — SIGNIFICANT CHANGE UP
PLAT MORPH BLD: NORMAL — SIGNIFICANT CHANGE UP
PLATELET # BLD AUTO: 84 K/UL — LOW (ref 150–400)
POIKILOCYTOSIS BLD QL AUTO: SLIGHT — SIGNIFICANT CHANGE UP
POLYCHROMASIA BLD QL SMEAR: SLIGHT — SIGNIFICANT CHANGE UP
RBC # BLD: 2.66 M/UL — LOW (ref 3.8–5.2)
RBC # FLD: 23.9 % — HIGH (ref 10.3–14.5)
RBC BLD AUTO: ABNORMAL
TARGETS BLD QL SMEAR: SLIGHT — SIGNIFICANT CHANGE UP
VARIANT LYMPHS # BLD: 1.8 % — SIGNIFICANT CHANGE UP (ref 0–6)
WBC # BLD: 7.82 K/UL — SIGNIFICANT CHANGE UP (ref 3.8–10.5)
WBC # FLD AUTO: 7.82 K/UL — SIGNIFICANT CHANGE UP (ref 3.8–10.5)

## 2025-01-03 PROCEDURE — 99239 HOSP IP/OBS DSCHRG MGMT >30: CPT

## 2025-01-03 PROCEDURE — 99222 1ST HOSP IP/OBS MODERATE 55: CPT

## 2025-01-03 PROCEDURE — P9040: CPT

## 2025-01-03 PROCEDURE — 36430 TRANSFUSION BLD/BLD COMPNT: CPT

## 2025-01-03 PROCEDURE — 85610 PROTHROMBIN TIME: CPT

## 2025-01-03 PROCEDURE — 83690 ASSAY OF LIPASE: CPT

## 2025-01-03 PROCEDURE — 86922 COMPATIBILITY TEST ANTIGLOB: CPT

## 2025-01-03 PROCEDURE — 71045 X-RAY EXAM CHEST 1 VIEW: CPT

## 2025-01-03 PROCEDURE — 87637 SARSCOV2&INF A&B&RSV AMP PRB: CPT

## 2025-01-03 PROCEDURE — 0241U: CPT

## 2025-01-03 PROCEDURE — 86900 BLOOD TYPING SEROLOGIC ABO: CPT

## 2025-01-03 PROCEDURE — 99284 EMERGENCY DEPT VISIT MOD MDM: CPT | Mod: 25

## 2025-01-03 PROCEDURE — 96374 THER/PROPH/DIAG INJ IV PUSH: CPT

## 2025-01-03 PROCEDURE — 85730 THROMBOPLASTIN TIME PARTIAL: CPT

## 2025-01-03 PROCEDURE — 96376 TX/PRO/DX INJ SAME DRUG ADON: CPT

## 2025-01-03 PROCEDURE — 36415 COLL VENOUS BLD VENIPUNCTURE: CPT

## 2025-01-03 PROCEDURE — 96375 TX/PRO/DX INJ NEW DRUG ADDON: CPT

## 2025-01-03 PROCEDURE — G0378: CPT

## 2025-01-03 PROCEDURE — 86901 BLOOD TYPING SEROLOGIC RH(D): CPT

## 2025-01-03 PROCEDURE — 80053 COMPREHEN METABOLIC PANEL: CPT

## 2025-01-03 PROCEDURE — 85025 COMPLETE CBC W/AUTO DIFF WBC: CPT

## 2025-01-03 PROCEDURE — 86850 RBC ANTIBODY SCREEN: CPT

## 2025-01-03 PROCEDURE — 84702 CHORIONIC GONADOTROPIN TEST: CPT

## 2025-01-03 RX ORDER — BENZONATATE 100 MG
100 CAPSULE ORAL THREE TIMES A DAY
Refills: 0 | Status: DISCONTINUED | OUTPATIENT
Start: 2025-01-03 | End: 2025-01-03

## 2025-01-03 RX ORDER — OSELTAMIVIR 75 MG/1
75 CAPSULE ORAL ONCE
Refills: 0 | Status: COMPLETED | OUTPATIENT
Start: 2025-01-03 | End: 2025-01-03

## 2025-01-03 RX ORDER — HEPARIN SODIUM,PORCINE/PF 1 UNIT/ML
300 SYRINGE (ML) INTRAVENOUS ONCE
Refills: 0 | Status: COMPLETED | OUTPATIENT
Start: 2025-01-03 | End: 2025-01-03

## 2025-01-03 RX ORDER — OSELTAMIVIR 75 MG/1
1 CAPSULE ORAL
Qty: 9 | Refills: 0
Start: 2025-01-03 | End: 2025-01-07

## 2025-01-03 RX ORDER — BENZONATATE 100 MG
100 CAPSULE ORAL ONCE
Refills: 0 | Status: COMPLETED | OUTPATIENT
Start: 2025-01-03 | End: 2025-01-03

## 2025-01-03 RX ADMIN — ONDANSETRON 4 MILLIGRAM(S): 4 TABLET ORAL at 00:17

## 2025-01-03 RX ADMIN — Medication 2 MILLIGRAM(S): at 06:26

## 2025-01-03 RX ADMIN — Medication 100 MILLIGRAM(S): at 01:57

## 2025-01-03 RX ADMIN — ONDANSETRON 4 MILLIGRAM(S): 4 TABLET ORAL at 08:03

## 2025-01-03 RX ADMIN — METHADONE HYDROCHLORIDE 5 MILLIGRAM(S): 10 TABLET ORAL at 00:17

## 2025-01-03 RX ADMIN — Medication 2 MILLIGRAM(S): at 00:19

## 2025-01-03 RX ADMIN — Medication 300 UNIT(S): at 10:47

## 2025-01-03 RX ADMIN — OSELTAMIVIR 75 MILLIGRAM(S): 75 CAPSULE ORAL at 09:53

## 2025-01-03 NOTE — CONSULT NOTE ADULT - ASSESSMENT
39y old female with history of metastatic breast cancer ER/MS Neg, HER-2 pos on chemotherapy (mets to lung, liver, spleen, spine, bone and brain), acute on chronic anemia with hx of multiple blood transfusions, most recently  November 2024 returns to the ED with anemia Hgb of 5.5 at her oncologist office, also found to be Flu A positive. She has corrected with 2 units pRBC transfusion to a hemoglobin of 8 and is currently without any melena or hematochezia or symptoms of anemia dizziness lightheaded. Given that she has had multiple EGD/colonoscopy in the recent past (last  colonoscopy on 11/26/2024 which showed Internal hemorrhoids o/w normal colonoscopy to the cecum and EGD/colonoscopy (10/20/24) which revealed just gastritis) will hold off on any repeat endoscopies. Her anemia is chronic and likely from chemotherapy. Continue ppi BID. Keep Hgb >7. Transfuse as necessary. GI will sign off. Thank you for the courtesy of this consult.    39y old female with history of metastatic breast cancer ER/RI Neg, HER-2 pos on chemotherapy (mets to lung, liver, spleen, spine, bone and brain), acute on chronic anemia with hx of multiple blood transfusions, most recently  November 2024 returns to the ED with anemia Hgb of 5.5 at her oncologist office, also found to be Flu A positive. She has corrected with 2 units pRBC transfusion to a hemoglobin of 8 and is currently without any melena or hematochezia or symptoms of anemia dizziness lightheaded. Given that she has had multiple EGD/colonoscopy in the recent past (last  colonoscopy on 11/26/2024 which showed Internal hemorrhoids o/w normal colonoscopy to the cecum and EGD/colonoscopy (10/20/24) which revealed just gastritis) will hold off on any repeat endoscopies.     #Anemia  Her anemia is chronic and likely from chemotherapy.   Continue ppi BID.   Keep Hgb >7.   Transfuse as necessary.   GI will sign off.   Thank you for the courtesy of this consult.

## 2025-01-03 NOTE — ED CDU PROVIDER SUBSEQUENT DAY NOTE - CLINICAL SUMMARY MEDICAL DECISION MAKING FREE TEXT BOX
38yo F pmh metastatic breast Ca on chemoinfusion and chemo PO meds p/w anemia. pt received call from oncologist advising her to go to ED for transfusion as hgb was 5.5. in ED labs showed hgb of 5.0. pt also c/o abdominal pain, back pain, SOB, vomiting and "dark black" diarrhea. pt reports sx are chronic. pt known to ED, has had multiple GI bleed work up, dx with GAVE. pt placed in obs under anemia protocol. pt received 2 units PRBC which improved hgb to 8. GI consult requested to determine if endoscopy needed. rvp positive for influenza A

## 2025-01-03 NOTE — ED CDU PROVIDER DISPOSITION NOTE - WHO RECOMMENDED
Placed on observation for packed red blood cell transfusion to treat anemia chronic disease.  History of metastatic breast cancer on chemotherapy with multiple transfusions in the past.  Sent to emergency department by oncology for treatment of low hemoglobin.  Patient reports no symptoms during transfusion.  Also noted to be flu A positive:   Tamiflu prescribed.

## 2025-01-03 NOTE — ED ADULT NURSE REASSESSMENT NOTE - NS ED NURSE REASSESS COMMENT FT1
patient remains stable. patient pending GI consult. patient given pain medication as per order. comfort and safety maintained.

## 2025-01-03 NOTE — ED CDU PROVIDER DISPOSITION NOTE - NSFOLLOWUPINSTRUCTIONS_ED_ALL_ED_FT
- Return to the ED for any new or worsening symptoms.   - Follow up with your doctor within 2-3 days.   - Please complete course of Tamiflu as directed  - Drink plenty of fluids to remain hydrated, i.e water, pedialyte, gatorade  - Wash hands frequently with warm soapy water    Anemia    Anemia is a condition in which the concentration of red blood cells or hemoglobin in the blood is below normal. Hemoglobin is a substance in red blood cells that carries oxygen to the tissues of the body. Anemia results in not enough oxygen reaching these tissues which can cause symptoms such as weakness, dizziness/lightheadedness, shortness of breath, chest pain, paleness, or nausea. The cause of your anemia may or may not be determined immediately. If your hemoglobin was dangerously low, you may have received a blood transfusion. Usually reactions to transfusions occur immediately but monitor yourself for any fevers, rash, or shortness of breath.    SEEK IMMEDIATE MEDICAL CARE IF YOU HAVE ANY OF THE FOLLOWING SYMPTOMS: extreme weakness/chest pain/shortness of breath, black or bloody stools, vomiting blood, fainting, fever, or any signs of dehydration.     Viral Respiratory Infection    A viral respiratory infection is an illness that affects parts of the body used for breathing, like the lungs, nose, and throat. It is caused by a germ called a virus. Symptoms can include runny nose, coughing, sneezing, fatigue, body aches, sore throat, fever, or headache. Over the counter medicine can be used to manage the symptoms but the infection typically goes away on its own in 5 to 10 days.     SEEK IMMEDIATE MEDICAL CARE IF YOU HAVE ANY OF THE FOLLOWING SYMPTOMS: shortness of breath, chest pain, fever over 10 days, or lightheadedness/dizziness. [facility]alt[facility] Counseling: I discussed with the patient the risks of ixekizumab including but not limited to immunosuppression, serious infections, worsening of inflammatory bowel disease and drug reactions.  The patient understands that monitoring is required including a PPD at baseline and must alert us or the primary physician if symptoms of infection or other concerning signs are noted.

## 2025-01-03 NOTE — ED CDU PROVIDER DISPOSITION NOTE - PATIENT PORTAL LINK FT
You can access the FollowMyHealth Patient Portal offered by [facility] by registering at the following website: [email or web address]. By joining [facility]’s FollowMyHealth portal, you will also be able to view your health information using other applications (apps) compatible with our system.

## 2025-01-03 NOTE — ED CDU PROVIDER SUBSEQUENT DAY NOTE - ATTENDING APP SHARED VISIT CONTRIBUTION OF CARE
Seen on rounds.  Reports no symptoms at present.  Received 2 units packed red blood cells: No symptoms during transfusion.  Patient also noted to be flu a positive:   Reports dry cough for 3 days, body aches and back pain (which she is unable to differentiate from chronic symptoms).  No fever.  Patient reports intermittent black stools: Will discuss additional evaluation with GI.

## 2025-01-03 NOTE — ED CDU PROVIDER SUBSEQUENT DAY NOTE - PROGRESS NOTE DETAILS
Pt evaluated on AM rounds, feeling better after transfusion. Rpt H&H 8.0/25.7. Started on tamiflu. Spoke with GI - no plans for inpatient endoscopy/colonoscopy.

## 2025-01-03 NOTE — ED CDU PROVIDER SUBSEQUENT DAY NOTE - HISTORY
Pt resting comfortably at time of re-assessment. No events overnight. Pending GI consult. Will continue to monitor.

## 2025-01-03 NOTE — ED CDU PROVIDER DISPOSITION NOTE - CLINICAL COURSE
Okay to give verbal order for [employer] to continue. The only blood test I need added is an A1c for his diabetes. 38yo F PMHx metastatic breast Ca on chemo infusion and chemo PO meds presented to ED for anemia. Pt received call from oncologist advising her to go to ED for transfusion as hgb was 5.5. in ED labs showed hgb of 5.0. pt also c/o abdominal pain, back pain, SOB, vomiting and "dark black" diarrhea. pt reports sx are chronic. pt known to ED, has had multiple GI bleed work up, dx with GAVE. pt placed in obs under anemia protocol for blood transfusion. Pt received 2 units PRBC without complication. Repeat hemoglobin 8.0 after transfusion. GI consulted - no inpatient workup as per GI. RVP positive for influenza A, pt started on tamiflu given medical hx. All results reviewed with patient. Hemodynamically stable. Provided copy of all results. return precautions discussed.

## 2025-01-03 NOTE — ED ADULT NURSE REASSESSMENT NOTE - NS ED NURSE REASSESS COMMENT FT1
Assumed care of pt from previous RN. PT is awake and alert but slower to responds to questions. RR even and unlabored on RA. PT denies any pain. Reports mild nausea without vomiting and cough. VS WNL. Remain on CM in NSR. Will medicate per orders.

## 2025-01-03 NOTE — CONSULT NOTE ADULT - ATTENDING COMMENTS
Patient is very well-known to me and is a 39-year-old woman with a past medical history of metastatic breast cancer and acute on chronic anemia who has had multiple gastroenterologic evaluations for severe anemia.  She presents with hemoglobin 5.5 and cough.  She is found to have the flu on this admission.  Patient has had multiple endoscopies and colonoscopies for assessment of anemia over the past year including her most recent endoscopy colonoscopy in October with no clear source of bleed and a colonoscopy in November with no clear bleeding.  Considering this we will plan for conservative management.  Please have patient see hematology outpatient for continued monitoring of blood counts and transfusion as needed.  No plans for further GI intervention as there is likely little benefit in this scenario. GI to sign off, please call with questions or concerns

## 2025-01-03 NOTE — CONSULT NOTE ADULT - SUBJECTIVE AND OBJECTIVE BOX
Chief Complaint:  Patient is a 39y old  Female who presents with a chief complaint of acute on chronic anemia.       Patient is a 39y old female who was sent in by her oncologist for Hgb of 5.5 on recent blood work. She also has been having dark colored stools, which is chronic for the last few months. She reports LLQ pain which is improved since admission. She denies nausea, vomiting, constipation, melena, hematochezia at this time. Complains of a cough. Denies fevers, chills and night sweats. Her Hgb in the ED was 5.0. She received 2 units pRBC with repeat at 8.     Pt has a history of metastatic breast cancer ER/[place] Neg, HER-2 pos on chemotherapy (mets to lung, liver, spleen, spine, bone and brain). GI asked to consult for concern of GIB and anemia. Pt has been admitted for similar symptoms multiple times in the past. She has had multiple blood transfusions, most recently end of November 2024. She underwent a colonoscopy on 11/26/2024 which showed Internal hemorrhoids o/w normal colonoscopy to the cecum. She had EGD/colonoscopy (10/20/24) which revealed just gastritis.     PAST MEDICAL & SURGICAL HISTORY:  H/O compression fracture of spine      Anxiety  Metastatic breast cancer  H/O pleural effusion  Pericardial effusion  S/P tonsillectomy  H/O chest tube placement  12/23/21  S/P pericardiocentesis  12/28/21    REVIEW OF SYSTEMS:   General: Negative  HEENT: Negative  CV: Negative  Respiratory: Negative  GI: See HPI  : Negative  MSK: Negative  Hematologic: Negative  Skin: Negative    MEDICATIONS:   MEDICATIONS  (STANDING):  methadone    Tablet 10 milliGRAM(s) Oral at bedtime    MEDICATIONS  (PRN):  HYDROmorphone  Injectable 2 milliGRAM(s) IV Push every 4 hours PRN Severe Pain (7 - 10)  HYDROmorphone  Injectable 1 milliGRAM(s) IV Push every 4 hours PRN Moderate Pain (4 - 6)  ondansetron Injectable 4 milliGRAM(s) IV Push every 6 hours PRN Nausea and/or Vomiting      Packed Red Cells Order:  1 Unit  Indication: Symptomatic Anemia - e.g. Chest Pain, Orthostasis, Tach  Infuse Unit : 3 Hours (01-02-25 @ 18:46)  Packed Red Cells Order:  1 Unit  Indication: Symptomatic Anemia - e.g. Chest Pain, Orthostasis, Tach  Infuse Unit : 3 Hours (01-02-25 @ 18:46)      DIET:  Diet, DASH/TLC:   Sodium & Cholesterol Restricted (01-02-25 @ 23:00) [Active]    ALLERGIES:   Allergies    pertuzumab (Other (Severe))  Perjeta (Other (Severe))    Intolerances        Substance Use:   (  ) never used  (  ) other:  Tobacco Usage:  (   ) never smoked   (   ) former smoker   (   ) current smoker  (     ) pack year  (        ) last cigarette date  Alcohol Usage:    Family History   IBD (  ) Yes   (  ) No  GI Malignancy (  )  Yes    (  ) No    Health Management  Last Colonoscopy:  Last Endoscopy:     VITAL SIGNS:   Vital Signs Last 24 Hrs  T(C): 36.5 (03 Jan 2025 10:54), Max: 37.2 (02 Jan 2025 16:05)  T(F): 97.7 (03 Jan 2025 10:54), Max: 98.9 (02 Jan 2025 16:05)  HR: 89 (03 Jan 2025 10:54) (73 - 103)  BP: 96/67 (03 Jan 2025 10:54) (90/50 - 123/55)  BP(mean): --  RR: 18 (03 Jan 2025 10:54) (15 - 19)  SpO2: 95% (03 Jan 2025 10:54) (94% - 100%)    Parameters below as of 03 Jan 2025 10:54  Patient On (Oxygen Delivery Method): room air      I&O's Summary      PHYSICAL EXAM:   GENERAL:  No acute distress  HEENT:  NC/AT, conjunctiva clear, sclera anicteric  CHEST:  No increased effort  HEART:  Regular rate  ABDOMEN:  Soft, slightly tender to palpation LLQ; no gaurding, non-distended, no rebound or guarding  EXTREMITIES: No edema  SKIN:  Warm, dry  NEURO:  Calm, cooperative    LABS:                        8.0    7.82  )-----------( 84       ( 03 Jan 2025 03:59 )             25.7     Hemoglobin: 8.0 g/dL (01-03-25 @ 03:59)  Hemoglobin: 5.0 g/dL (01-02-25 @ 17:51)    01-02    138  |  103  |  12.4  ----------------------------<  147[H]  3.6   |  22.0  |  0.39[L]    Ca    7.1[L]      02 Jan 2025 17:51    TPro  4.6[L]  /  Alb  2.5[L]  /  TBili  0.6  /  DBili  x   /  AST  44[H]  /  ALT  36[H]  /  AlkPhos  232[H]  01-02    LIVER FUNCTIONS - ( 02 Jan 2025 17:51 )  Alb: 2.5 g/dL / Pro: 4.6 g/dL / ALK PHOS: 232 U/L / ALT: 36 U/L / AST: 44 U/L / GGT: x             PT/INR - ( 02 Jan 2025 17:51 )   PT: 14.1 sec;   INR: 1.25 ratio         PTT - ( 02 Jan 2025 17:51 )  PTT:40.1 sec    Lipase: 11 U/L (01-02-25 @ 17:51)                                    RADIOLOGY & ADDITIONAL STUDIES:         Chief Complaint:  Patient is a 39y old  Female who presents with a chief complaint of acute on chronic anemia.       Patient is a 39y old female who was sent in by her oncologist for Hgb of 5.5 on recent blood work. She also has been having dark colored stools, which is chronic for the last few months. She reports LLQ pain which is improved since admission. She denies nausea, vomiting, constipation, melena, hematochezia at this time. Complains of a cough. Denies fevers, chills and night sweats. Her Hgb in the ED was 5.0. She received 2 units pRBC with repeat at 8.     Pt has a history of metastatic breast cancer ER/SD Neg, HER-2 pos on chemotherapy (mets to lung, liver, spleen, spine, bone and brain). GI asked to consult for concern of GIB and anemia. Pt has been admitted for similar symptoms multiple times in the past. She has had multiple blood transfusions, most recently end of November 2024. She underwent a colonoscopy on 11/26/2024 which showed Internal hemorrhoids o/w normal colonoscopy to the cecum. She had EGD/colonoscopy (10/20/24) which revealed just gastritis.     PAST MEDICAL & SURGICAL HISTORY:  H/O compression fracture of spine      Anxiety  Metastatic breast cancer  H/O pleural effusion  Pericardial effusion  S/P tonsillectomy  H/O chest tube placement  12/23/21  S/P pericardiocentesis  12/28/21    REVIEW OF SYSTEMS:   General: Negative  HEENT: Negative  CV: Negative  Respiratory: Negative  GI: See HPI  : Negative  MSK: Negative  Hematologic: Negative  Skin: Negative    MEDICATIONS:   MEDICATIONS  (STANDING):  methadone    Tablet 10 milliGRAM(s) Oral at bedtime    MEDICATIONS  (PRN):  HYDROmorphone  Injectable 2 milliGRAM(s) IV Push every 4 hours PRN Severe Pain (7 - 10)  HYDROmorphone  Injectable 1 milliGRAM(s) IV Push every 4 hours PRN Moderate Pain (4 - 6)  ondansetron Injectable 4 milliGRAM(s) IV Push every 6 hours PRN Nausea and/or Vomiting      Packed Red Cells Order:  1 Unit  Indication: Symptomatic Anemia - e.g. Chest Pain, Orthostasis, Tach  Infuse Unit : 3 Hours (01-02-25 @ 18:46)  Packed Red Cells Order:  1 Unit  Indication: Symptomatic Anemia - e.g. Chest Pain, Orthostasis, Tach  Infuse Unit : 3 Hours (01-02-25 @ 18:46)      DIET:  Diet, DASH/TLC:   Sodium & Cholesterol Restricted (01-02-25 @ 23:00) [Active]    ALLERGIES:   Allergies    pertuzumab (Other (Severe))  Perjeta (Other (Severe))    Intolerances        Substance Use:   (  ) never used  (  ) other:  Tobacco Usage:  (   ) never smoked   (   ) former smoker   (   ) current smoker  (     ) pack year  (        ) last cigarette date  Alcohol Usage:    Family History   IBD (  ) Yes   (  ) No  GI Malignancy (  )  Yes    (  ) No    Health Management  Last Colonoscopy:  Last Endoscopy:     VITAL SIGNS:   Vital Signs Last 24 Hrs  T(C): 36.5 (03 Jan 2025 10:54), Max: 37.2 (02 Jan 2025 16:05)  T(F): 97.7 (03 Jan 2025 10:54), Max: 98.9 (02 Jan 2025 16:05)  HR: 89 (03 Jan 2025 10:54) (73 - 103)  BP: 96/67 (03 Jan 2025 10:54) (90/50 - 123/55)  BP(mean): --  RR: 18 (03 Jan 2025 10:54) (15 - 19)  SpO2: 95% (03 Jan 2025 10:54) (94% - 100%)    Parameters below as of 03 Jan 2025 10:54  Patient On (Oxygen Delivery Method): room air      I&O's Summary      PHYSICAL EXAM:   GENERAL:  No acute distress  HEENT:  NC/AT, conjunctiva clear, sclera anicteric  CHEST:  No increased effort  HEART:  Regular rate  ABDOMEN:  Soft, slightly tender to palpation LLQ; no gaurding, non-distended, no rebound or guarding  EXTREMITIES: No edema  SKIN:  Warm, dry  NEURO:  Calm, cooperative    LABS:                        8.0    7.82  )-----------( 84       ( 03 Jan 2025 03:59 )             25.7     Hemoglobin: 8.0 g/dL (01-03-25 @ 03:59)  Hemoglobin: 5.0 g/dL (01-02-25 @ 17:51)    01-02    138  |  103  |  12.4  ----------------------------<  147[H]  3.6   |  22.0  |  0.39[L]    Ca    7.1[L]      02 Jan 2025 17:51    TPro  4.6[L]  /  Alb  2.5[L]  /  TBili  0.6  /  DBili  x   /  AST  44[H]  /  ALT  36[H]  /  AlkPhos  232[H]  01-02    LIVER FUNCTIONS - ( 02 Jan 2025 17:51 )  Alb: 2.5 g/dL / Pro: 4.6 g/dL / ALK PHOS: 232 U/L / ALT: 36 U/L / AST: 44 U/L / GGT: x             PT/INR - ( 02 Jan 2025 17:51 )   PT: 14.1 sec;   INR: 1.25 ratio         PTT - ( 02 Jan 2025 17:51 )  PTT:40.1 sec    Lipase: 11 U/L (01-02-25 @ 17:51)                                    RADIOLOGY & ADDITIONAL STUDIES:

## 2025-01-05 ENCOUNTER — INPATIENT (INPATIENT)
Facility: HOSPITAL | Age: 40
LOS: 25 days | Discharge: HOSPICE | DRG: 871 | End: 2025-01-31
Attending: STUDENT IN AN ORGANIZED HEALTH CARE EDUCATION/TRAINING PROGRAM | Admitting: STUDENT IN AN ORGANIZED HEALTH CARE EDUCATION/TRAINING PROGRAM
Payer: MEDICARE

## 2025-01-05 VITALS
OXYGEN SATURATION: 95 % | HEART RATE: 84 BPM | RESPIRATION RATE: 22 BRPM | SYSTOLIC BLOOD PRESSURE: 105 MMHG | DIASTOLIC BLOOD PRESSURE: 69 MMHG | HEIGHT: 62 IN | TEMPERATURE: 99 F

## 2025-01-05 DIAGNOSIS — Z98.890 OTHER SPECIFIED POSTPROCEDURAL STATES: Chronic | ICD-10-CM

## 2025-01-05 DIAGNOSIS — J18.9 PNEUMONIA, UNSPECIFIED ORGANISM: ICD-10-CM

## 2025-01-05 DIAGNOSIS — Z90.89 ACQUIRED ABSENCE OF OTHER ORGANS: Chronic | ICD-10-CM

## 2025-01-05 LAB
ALBUMIN SERPL ELPH-MCNC: 2.4 G/DL — LOW (ref 3.3–5.2)
ALP SERPL-CCNC: 347 U/L — HIGH (ref 40–120)
ALT FLD-CCNC: 34 U/L — HIGH
ANION GAP SERPL CALC-SCNC: 12 MMOL/L — SIGNIFICANT CHANGE UP (ref 5–17)
ANISOCYTOSIS BLD QL: SLIGHT — SIGNIFICANT CHANGE UP
APTT BLD: 37.2 SEC — HIGH (ref 24.5–35.6)
AST SERPL-CCNC: 37 U/L — HIGH
BASOPHILS # BLD AUTO: 0 K/UL — SIGNIFICANT CHANGE UP (ref 0–0.2)
BASOPHILS NFR BLD AUTO: 0 % — SIGNIFICANT CHANGE UP (ref 0–2)
BILIRUB SERPL-MCNC: 1.2 MG/DL — SIGNIFICANT CHANGE UP (ref 0.4–2)
BUN SERPL-MCNC: 13.2 MG/DL — SIGNIFICANT CHANGE UP (ref 8–20)
CALCIUM SERPL-MCNC: 7.6 MG/DL — LOW (ref 8.4–10.5)
CHLORIDE SERPL-SCNC: 110 MMOL/L — HIGH (ref 96–108)
CO2 SERPL-SCNC: 21 MMOL/L — LOW (ref 22–29)
CREAT SERPL-MCNC: 0.44 MG/DL — LOW (ref 0.5–1.3)
DACRYOCYTES BLD QL SMEAR: SLIGHT — SIGNIFICANT CHANGE UP
EGFR: 127 ML/MIN/1.73M2 — SIGNIFICANT CHANGE UP
ELLIPTOCYTES BLD QL SMEAR: SLIGHT — SIGNIFICANT CHANGE UP
EOSINOPHIL # BLD AUTO: 0 K/UL — SIGNIFICANT CHANGE UP (ref 0–0.5)
EOSINOPHIL NFR BLD AUTO: 0 % — SIGNIFICANT CHANGE UP (ref 0–6)
FLUAV H1 2009 PAND RNA SPEC QL NAA+PROBE: DETECTED
GIANT PLATELETS BLD QL SMEAR: PRESENT — SIGNIFICANT CHANGE UP
GLUCOSE SERPL-MCNC: 88 MG/DL — SIGNIFICANT CHANGE UP (ref 70–99)
HCG SERPL-ACNC: <4 MIU/ML — SIGNIFICANT CHANGE UP
HCT VFR BLD CALC: 25.9 % — LOW (ref 34.5–45)
HCT VFR BLD CALC: 26.6 % — LOW (ref 34.5–45)
HGB BLD-MCNC: 8.1 G/DL — LOW (ref 11.5–15.5)
HGB BLD-MCNC: 8.2 G/DL — LOW (ref 11.5–15.5)
HYPOCHROMIA BLD QL: SLIGHT — SIGNIFICANT CHANGE UP
INR BLD: 1.45 RATIO — HIGH (ref 0.85–1.16)
LACTATE BLDV-MCNC: 1.5 MMOL/L — SIGNIFICANT CHANGE UP (ref 0.5–2)
LYMPHOCYTES # BLD AUTO: 0.09 K/UL — LOW (ref 1–3.3)
LYMPHOCYTES # BLD AUTO: 2.9 % — LOW (ref 13–44)
MACROCYTES BLD QL: SLIGHT — SIGNIFICANT CHANGE UP
MANUAL SMEAR VERIFICATION: SIGNIFICANT CHANGE UP
MCHC RBC-ENTMCNC: 29.6 PG — SIGNIFICANT CHANGE UP (ref 27–34)
MCHC RBC-ENTMCNC: 29.8 PG — SIGNIFICANT CHANGE UP (ref 27–34)
MCHC RBC-ENTMCNC: 30.8 G/DL — LOW (ref 32–36)
MCHC RBC-ENTMCNC: 31.3 G/DL — LOW (ref 32–36)
MCV RBC AUTO: 95.2 FL — SIGNIFICANT CHANGE UP (ref 80–100)
MCV RBC AUTO: 96 FL — SIGNIFICANT CHANGE UP (ref 80–100)
MICROCYTES BLD QL: SLIGHT — SIGNIFICANT CHANGE UP
MONOCYTES # BLD AUTO: 0 K/UL — SIGNIFICANT CHANGE UP (ref 0–0.9)
MONOCYTES NFR BLD AUTO: 0 % — LOW (ref 2–14)
MYELOCYTES NFR BLD: 1 % — HIGH (ref 0–0)
NEUTROPHILS # BLD AUTO: 2.67 K/UL — SIGNIFICANT CHANGE UP (ref 1.8–7.4)
NEUTROPHILS NFR BLD AUTO: 86.7 % — HIGH (ref 43–77)
NEUTS BAND # BLD: 0.9 % — SIGNIFICANT CHANGE UP (ref 0–8)
NEUTS BAND NFR BLD: 0.9 % — SIGNIFICANT CHANGE UP (ref 0–8)
NRBC # BLD: 10 /100 WBCS — HIGH (ref 0–0)
NRBC # BLD: 14 /100 WBCS — HIGH (ref 0–0)
NRBC BLD-RTO: 10 /100 WBCS — HIGH (ref 0–0)
NRBC BLD-RTO: 14 /100 WBCS — HIGH (ref 0–0)
OVALOCYTES BLD QL SMEAR: SLIGHT — SIGNIFICANT CHANGE UP
PLAT MORPH BLD: NORMAL — SIGNIFICANT CHANGE UP
PLATELET # BLD AUTO: 91 K/UL — LOW (ref 150–400)
PLATELET # BLD AUTO: 93 K/UL — LOW (ref 150–400)
POIKILOCYTOSIS BLD QL AUTO: SLIGHT — SIGNIFICANT CHANGE UP
POLYCHROMASIA BLD QL SMEAR: SIGNIFICANT CHANGE UP
POTASSIUM SERPL-MCNC: 3.3 MMOL/L — LOW (ref 3.5–5.3)
POTASSIUM SERPL-SCNC: 3.3 MMOL/L — LOW (ref 3.5–5.3)
PROMYELOCYTES # FLD: 0.9 % — HIGH (ref 0–0)
PROMYELOCYTES NFR BLD: 0.9 % — HIGH (ref 0–0)
PROT SERPL-MCNC: 5.1 G/DL — LOW (ref 6.6–8.7)
PROTHROM AB SERPL-ACNC: 16.8 SEC — HIGH (ref 9.9–13.4)
RAPID RVP RESULT: DETECTED
RBC # BLD: 2.72 M/UL — LOW (ref 3.8–5.2)
RBC # BLD: 2.77 M/UL — LOW (ref 3.8–5.2)
RBC # FLD: 26.3 % — HIGH (ref 10.3–14.5)
RBC # FLD: 26.5 % — HIGH (ref 10.3–14.5)
RBC BLD AUTO: ABNORMAL
SARS-COV-2 RNA SPEC QL NAA+PROBE: SIGNIFICANT CHANGE UP
SODIUM SERPL-SCNC: 143 MMOL/L — SIGNIFICANT CHANGE UP (ref 135–145)
TROPONIN T, HIGH SENSITIVITY RESULT: <6 NG/L — SIGNIFICANT CHANGE UP (ref 0–51)
VARIANT LYMPHS # BLD: 7.6 % — HIGH (ref 0–6)
VARIANT LYMPHS NFR BLD MANUAL: 7.6 % — HIGH (ref 0–6)
WBC # BLD: 2.97 K/UL — LOW (ref 3.8–10.5)
WBC # BLD: 3.05 K/UL — LOW (ref 3.8–10.5)
WBC # FLD AUTO: 2.97 K/UL — LOW (ref 3.8–10.5)
WBC # FLD AUTO: 3.05 K/UL — LOW (ref 3.8–10.5)

## 2025-01-05 PROCEDURE — 99223 1ST HOSP IP/OBS HIGH 75: CPT

## 2025-01-05 PROCEDURE — 99285 EMERGENCY DEPT VISIT HI MDM: CPT

## 2025-01-05 PROCEDURE — 93010 ELECTROCARDIOGRAM REPORT: CPT

## 2025-01-05 PROCEDURE — 71275 CT ANGIOGRAPHY CHEST: CPT | Mod: 26,MC

## 2025-01-05 PROCEDURE — 71045 X-RAY EXAM CHEST 1 VIEW: CPT | Mod: 26

## 2025-01-05 RX ORDER — BACTERIOSTATIC SODIUM CHLORIDE 0.9 %
1550 VIAL (ML) INJECTION ONCE
Refills: 0 | Status: COMPLETED | OUTPATIENT
Start: 2025-01-05 | End: 2025-01-05

## 2025-01-05 RX ORDER — MAGNESIUM, ALUMINUM HYDROXIDE 200-225/5
30 SUSPENSION, ORAL (FINAL DOSE FORM) ORAL EVERY 4 HOURS
Refills: 0 | Status: DISCONTINUED | OUTPATIENT
Start: 2025-01-05 | End: 2025-01-31

## 2025-01-05 RX ORDER — HYDROMORPHONE HYDROCHLORIDE 4 MG/ML
2 INJECTION, SOLUTION INTRAMUSCULAR; INTRAVENOUS; SUBCUTANEOUS
Refills: 0 | Status: DISCONTINUED | OUTPATIENT
Start: 2025-01-05 | End: 2025-01-07

## 2025-01-05 RX ORDER — OSELTAMIVIR PHOSPHATE 75 MG/1
75 CAPSULE ORAL
Refills: 0 | Status: COMPLETED | OUTPATIENT
Start: 2025-01-05 | End: 2025-01-08

## 2025-01-05 RX ORDER — ONDANSETRON 4 MG/1
4 TABLET, ORALLY DISINTEGRATING ORAL EVERY 8 HOURS
Refills: 0 | Status: DISCONTINUED | OUTPATIENT
Start: 2025-01-05 | End: 2025-01-31

## 2025-01-05 RX ORDER — PIPERACILLIN SODIUM AND TAZOBACTAM SODIUM 2; 250 G/50ML; MG/50ML
3.38 INJECTION, POWDER, FOR SOLUTION INTRAVENOUS ONCE
Refills: 0 | Status: COMPLETED | OUTPATIENT
Start: 2025-01-05 | End: 2025-01-05

## 2025-01-05 RX ORDER — PREGABALIN CAPSULES, CV 225 MG/1
200 CAPSULE ORAL EVERY 8 HOURS
Refills: 0 | Status: DISCONTINUED | OUTPATIENT
Start: 2025-01-05 | End: 2025-01-12

## 2025-01-05 RX ORDER — PREGABALIN CAPSULES, CV 225 MG/1
1 CAPSULE ORAL
Refills: 0 | DISCHARGE

## 2025-01-05 RX ORDER — HYDROMORPHONE HYDROCHLORIDE 4 MG/ML
0.5 INJECTION, SOLUTION INTRAMUSCULAR; INTRAVENOUS; SUBCUTANEOUS
Refills: 0 | Status: DISCONTINUED | OUTPATIENT
Start: 2025-01-05 | End: 2025-01-05

## 2025-01-05 RX ORDER — HYDROMORPHONE HYDROCHLORIDE 4 MG/ML
1 INJECTION, SOLUTION INTRAMUSCULAR; INTRAVENOUS; SUBCUTANEOUS
Refills: 0 | Status: DISCONTINUED | OUTPATIENT
Start: 2025-01-05 | End: 2025-01-05

## 2025-01-05 RX ORDER — SODIUM CHLORIDE 9 G/ML
1000 INJECTION, SOLUTION INTRAVENOUS
Refills: 0 | Status: DISCONTINUED | OUTPATIENT
Start: 2025-01-05 | End: 2025-01-05

## 2025-01-05 RX ORDER — HYDROMORPHONE HYDROCHLORIDE 4 MG/ML
0.2 INJECTION, SOLUTION INTRAMUSCULAR; INTRAVENOUS; SUBCUTANEOUS
Refills: 0 | Status: DISCONTINUED | OUTPATIENT
Start: 2025-01-05 | End: 2025-01-07

## 2025-01-05 RX ORDER — ACETAMINOPHEN 160 MG/5ML
650 SUSPENSION ORAL EVERY 6 HOURS
Refills: 0 | Status: DISCONTINUED | OUTPATIENT
Start: 2025-01-05 | End: 2025-01-31

## 2025-01-05 RX ORDER — METHYLPREDNISOLONE ACETATE 40 MG/ML
40 VIAL (ML) INJECTION EVERY 8 HOURS
Refills: 0 | Status: DISCONTINUED | OUTPATIENT
Start: 2025-01-05 | End: 2025-01-09

## 2025-01-05 RX ORDER — VANCOMYCIN HYDROCHLORIDE 50 MG/ML
1000 KIT ORAL ONCE
Refills: 0 | Status: COMPLETED | OUTPATIENT
Start: 2025-01-05 | End: 2025-01-05

## 2025-01-05 RX ORDER — DEXTROMETHORPHAN HBR AND GUAIFENESIN ORAL SOLUTION 10; 100 MG/5ML; MG/5ML
10 LIQUID ORAL EVERY 4 HOURS
Refills: 0 | Status: DISCONTINUED | OUTPATIENT
Start: 2025-01-05 | End: 2025-01-31

## 2025-01-05 RX ORDER — OCTREOTIDE ACETATE 1000 UG/ML
100 INJECTION INTRAVENOUS; SUBCUTANEOUS
Refills: 0 | Status: DISCONTINUED | OUTPATIENT
Start: 2025-01-05 | End: 2025-01-31

## 2025-01-05 RX ORDER — ACETAMINOPHEN, DIPHENHYDRAMINE HCL, PHENYLEPHRINE HCL 325; 25; 5 MG/1; MG/1; MG/1
3 TABLET ORAL AT BEDTIME
Refills: 0 | Status: DISCONTINUED | OUTPATIENT
Start: 2025-01-05 | End: 2025-01-31

## 2025-01-05 RX ORDER — HYDROMORPHONE HYDROCHLORIDE 4 MG/ML
1 INJECTION, SOLUTION INTRAMUSCULAR; INTRAVENOUS; SUBCUTANEOUS ONCE
Refills: 0 | Status: DISCONTINUED | OUTPATIENT
Start: 2025-01-05 | End: 2025-01-05

## 2025-01-05 RX ORDER — VANCOMYCIN HYDROCHLORIDE 50 MG/ML
1000 KIT ORAL EVERY 12 HOURS
Refills: 0 | Status: DISCONTINUED | OUTPATIENT
Start: 2025-01-06 | End: 2025-01-06

## 2025-01-05 RX ORDER — PANTOPRAZOLE 20 MG/1
40 TABLET, DELAYED RELEASE ORAL
Refills: 0 | Status: DISCONTINUED | OUTPATIENT
Start: 2025-01-05 | End: 2025-01-06

## 2025-01-05 RX ORDER — POTASSIUM CHLORIDE 750 MG/1
40 TABLET, EXTENDED RELEASE ORAL ONCE
Refills: 0 | Status: COMPLETED | OUTPATIENT
Start: 2025-01-05 | End: 2025-01-05

## 2025-01-05 RX ORDER — PIPERACILLIN SODIUM AND TAZOBACTAM SODIUM 2; 250 G/50ML; MG/50ML
3.38 INJECTION, POWDER, FOR SOLUTION INTRAVENOUS EVERY 8 HOURS
Refills: 0 | Status: DISCONTINUED | OUTPATIENT
Start: 2025-01-05 | End: 2025-01-06

## 2025-01-05 RX ORDER — POTASSIUM CHLORIDE 750 MG/1
40 TABLET, EXTENDED RELEASE ORAL ONCE
Refills: 0 | Status: DISCONTINUED | OUTPATIENT
Start: 2025-01-05 | End: 2025-01-05

## 2025-01-05 RX ORDER — HYDROMORPHONE HYDROCHLORIDE 4 MG/ML
1 INJECTION, SOLUTION INTRAMUSCULAR; INTRAVENOUS; SUBCUTANEOUS
Refills: 0 | Status: DISCONTINUED | OUTPATIENT
Start: 2025-01-05 | End: 2025-01-07

## 2025-01-05 RX ADMIN — VANCOMYCIN HYDROCHLORIDE 250 MILLIGRAM(S): KIT at 23:31

## 2025-01-05 RX ADMIN — OSELTAMIVIR PHOSPHATE 75 MILLIGRAM(S): 75 CAPSULE ORAL at 18:57

## 2025-01-05 RX ADMIN — VANCOMYCIN HYDROCHLORIDE 250 MILLIGRAM(S): KIT at 11:52

## 2025-01-05 RX ADMIN — PREGABALIN CAPSULES, CV 200 MILLIGRAM(S): 225 CAPSULE ORAL at 23:31

## 2025-01-05 RX ADMIN — OCTREOTIDE ACETATE 100 MICROGRAM(S): 1000 INJECTION INTRAVENOUS; SUBCUTANEOUS at 18:56

## 2025-01-05 RX ADMIN — Medication 100 MILLIGRAM(S): at 16:43

## 2025-01-05 RX ADMIN — HYDROMORPHONE HYDROCHLORIDE 0.2 MILLIGRAM(S): 4 INJECTION, SOLUTION INTRAMUSCULAR; INTRAVENOUS; SUBCUTANEOUS at 23:44

## 2025-01-05 RX ADMIN — PIPERACILLIN SODIUM AND TAZOBACTAM SODIUM 25 GRAM(S): 2; 250 INJECTION, POWDER, FOR SOLUTION INTRAVENOUS at 16:40

## 2025-01-05 RX ADMIN — HYDROMORPHONE HYDROCHLORIDE 1 MILLIGRAM(S): 4 INJECTION, SOLUTION INTRAMUSCULAR; INTRAVENOUS; SUBCUTANEOUS at 15:56

## 2025-01-05 RX ADMIN — Medication 40 MILLIGRAM(S): at 23:31

## 2025-01-05 RX ADMIN — HYDROMORPHONE HYDROCHLORIDE 1 MILLIGRAM(S): 4 INJECTION, SOLUTION INTRAMUSCULAR; INTRAVENOUS; SUBCUTANEOUS at 20:27

## 2025-01-05 RX ADMIN — HYDROMORPHONE HYDROCHLORIDE 0.5 MILLIGRAM(S): 4 INJECTION, SOLUTION INTRAMUSCULAR; INTRAVENOUS; SUBCUTANEOUS at 19:42

## 2025-01-05 RX ADMIN — HYDROMORPHONE HYDROCHLORIDE 1 MILLIGRAM(S): 4 INJECTION, SOLUTION INTRAMUSCULAR; INTRAVENOUS; SUBCUTANEOUS at 17:18

## 2025-01-05 RX ADMIN — PIPERACILLIN SODIUM AND TAZOBACTAM SODIUM 25 GRAM(S): 2; 250 INJECTION, POWDER, FOR SOLUTION INTRAVENOUS at 23:32

## 2025-01-05 RX ADMIN — PIPERACILLIN SODIUM AND TAZOBACTAM SODIUM 200 GRAM(S): 2; 250 INJECTION, POWDER, FOR SOLUTION INTRAVENOUS at 11:00

## 2025-01-05 RX ADMIN — HYDROMORPHONE HYDROCHLORIDE 0.5 MILLIGRAM(S): 4 INJECTION, SOLUTION INTRAMUSCULAR; INTRAVENOUS; SUBCUTANEOUS at 17:15

## 2025-01-05 RX ADMIN — HYDROMORPHONE HYDROCHLORIDE 1 MILLIGRAM(S): 4 INJECTION, SOLUTION INTRAMUSCULAR; INTRAVENOUS; SUBCUTANEOUS at 12:48

## 2025-01-05 RX ADMIN — POTASSIUM CHLORIDE 40 MILLIEQUIVALENT(S): 750 TABLET, EXTENDED RELEASE ORAL at 16:42

## 2025-01-05 RX ADMIN — Medication 1550 MILLILITER(S): at 11:00

## 2025-01-05 RX ADMIN — HYDROMORPHONE HYDROCHLORIDE 2 MILLIGRAM(S): 4 INJECTION, SOLUTION INTRAMUSCULAR; INTRAVENOUS; SUBCUTANEOUS at 18:57

## 2025-01-05 RX ADMIN — DEXTROMETHORPHAN HBR AND GUAIFENESIN ORAL SOLUTION 10 MILLILITER(S): 10; 100 LIQUID ORAL at 17:18

## 2025-01-05 RX ADMIN — SODIUM CHLORIDE 85 MILLILITER(S): 9 INJECTION, SOLUTION INTRAVENOUS at 16:40

## 2025-01-05 RX ADMIN — Medication 1550 MILLILITER(S): at 13:50

## 2025-01-05 RX ADMIN — HYDROMORPHONE HYDROCHLORIDE 2 MILLIGRAM(S): 4 INJECTION, SOLUTION INTRAMUSCULAR; INTRAVENOUS; SUBCUTANEOUS at 19:42

## 2025-01-05 NOTE — ED ADULT NURSE REASSESSMENT NOTE - NS ED NURSE REASSESS COMMENT FT1
Pt sleeping comfortably in stretcher, no obvious signs of pain, discomfort or distress @ this time present. Resp even and unlabored. Pt safety maintained. Pending bed in [facility]. Pt made aware of plan of care and verbalized understanding.

## 2025-01-05 NOTE — H&P ADULT - NSHPPHYSICALEXAM_GEN_ALL_CORE
GENERAL: pt examined bedside, appears uncomfortable, toxic appearing speaking 2-3 word sentences   HEENT: NC/AT, dry oral mucosa, pale conjunctiva, sclera nonicteric  RESPIRATORY: poor inspiratory effort, scattered ronchi and insp/exp wheezing   CARDIOVASCULAR: RRR, normal S1 and S2, no murmur/rub/gallop  ABDOMEN: soft, NT/ND, normoactive bowel sounds, no rebound/guarding  MSK: No joint deformities, edema, erythema  EXTREMITIES: No cynaosis, no clubbing, no lower extremity edema  PSYCH: affect flat but cooperative  NEUROLOGY: A+O to person, place, and time, no focal neurologic deficits appreciated  SKIN: No rashes or no palpable lesions

## 2025-01-05 NOTE — ED PROVIDER NOTE - TEMPLATE, MLM
VIEW ALL Secondary Intention Text (Leave Blank If You Do Not Want): After discussion and consideration of repair and wound management options, the patient opted to allow the defect to heal by second intent.

## 2025-01-05 NOTE — ED PROVIDER NOTE - ATTENDING CONTRIBUTION TO CARE
I have personally performed a history and physical examination of the patient and discussed management with the resident as well as the patient.  I reviewed the resident's note and agree with the documented findings and plan of care.  I have authored and modified critical sections of the Provider Note, including but not limited to HPI, Physical Exam and MDM.    39 year old female with PMHx breast cancer with metastasis to lung, liver, spleen, bones, brain, on chemo infusion and PO medications presenting with day 4 of progressively worsening shortness of breath. Patient seen here 3 days ago for anemia, not feeling well but still okay, received transfusion and was discharged. Gradually has been doing worse at home, more tired. This morning patient was found by parents to be significantly worse, having trouble breathing, worsening cough, complaining of chest pain with coughing, unable to get out of bed. Patient flu+ on swab from 3 days ago.  As per independent chart review increasing left lung infiltrates at that time.  Concern for progressive infiltrates leading to pneumonia.  Will treat as septic despite hemodynamic stability given history of cancer.  Will obtain CT PE study for evaluation of PE versus PNA.  Will obtain CBC, CMP, BC, RVP, and provide IV fluids and antibiotics for hospital-acquired PNA.  Patient SpO2 95% on room air however increased work of breathing.  Likely admission given clinical appearance pending results. I have personally performed a history and physical examination of the patient and discussed management with the resident as well as the patient.  I reviewed the resident's note and agree with the documented findings and plan of care.  I have authored and modified critical sections of the Provider Note, including but not limited to HPI, Physical Exam and MDM.    39 year old female with PMHx breast cancer with metastasis to lung, liver, spleen, bones, brain, on chemo infusion and PO medications presenting with day 4 of progressively worsening shortness of breath. Patient seen here 3 days ago for anemia, not feeling well but still okay, received transfusion and was discharged. Gradually has been doing worse at home, more tired. This morning patient was found by parents to be significantly worse, having trouble breathing, worsening cough, complaining of chest pain with coughing, unable to get out of bed. Patient flu+ on swab from 3 days ago.  As per independent chart review increasing left lung infiltrates at that time.  Concern for progressive infiltrates leading to pneumonia.  Will treat as septic despite hemodynamic stability given history of cancer.  Will obtain CT PE study for evaluation of PE versus PNA.  Will obtain CBC, CMP, BC, RVP, and provide IV fluids and antibiotics for hospital-acquired PNA.  Patient SpO2 95% on room air however increased work of breathing. Independent review of EKG shows NSR without STEMI and isolated T wave inversions in V4 through V6, no STEMI, 80 bpm, , QRS 84, QTc 417.  T wave inversions new compared to prior on April 9, 2024, likely in the setting of respiratory disease.  Low suspicion ACS at this time given known viral illness.  Will obtain TNI for evaluation of possible myocarditis. Likely admission given clinical appearance pending results.

## 2025-01-05 NOTE — ED ADULT TRIAGE NOTE - CHIEF COMPLAINT QUOTE
PT BIBA from home for SOB and decreased mental status. Recent Flu dx. Per EMS 94% on RA. Placed on NC for comfort.

## 2025-01-05 NOTE — PATIENT PROFILE ADULT - FALL HARM RISK - HARM RISK INTERVENTIONS

## 2025-01-05 NOTE — ED ADULT NURSE NOTE - OBJECTIVE STATEMENT
Pt received A&Ox4 presenting in CC with inspiratory chest pain with SOB. Recently diagnosed with flu. Hx of Stage IV breast ca with mets. As per family, pt was less responsive and slightly altered this am. Pt presents tachypneic with labored resp. Pt placed on CM in ST with . R chest port wall present, no s/s of infection. Pt and family made aware of plan of care and verbalized understanding.

## 2025-01-05 NOTE — ED PROVIDER NOTE - CLINICAL SUMMARY MEDICAL DECISION MAKING FREE TEXT BOX
39yof with hx breast CA on chemo infusion and po meds with mets presenting with gradually worsening shortness of breath, cough, and tiredness over last 4 days, today unable to get out of bed. Patient is flu+ on swab 3 days ago. plan sepsis w/u, cta chest, admission.

## 2025-01-05 NOTE — ED PROVIDER NOTE - OBJECTIVE STATEMENT
39 year old female with PMHx breast cancer with metastasis to lung, liver, spleen, bones, brain, on chemo infusion and PO medications presenting with day 4 of progressively worsening shortness of breath. Patient seen here 3 days ago for anemia, not feeling well but still okay, received transfusion and was discharged. Gradually has been doing worse at home, more tired. This morning patient was found by parents to be significantly worse, having trouble breathing, worsening cough, complaining of chest pain with coughing, unable to get out of bed. Patient flu+ on swab from 3 days ago. No vomiting, diarrhea, fevers.

## 2025-01-05 NOTE — ED PROVIDER NOTE - PHYSICAL EXAMINATION
Gen: tired appearing female, eyes closed, however easily arousable  Head: normocephalic, atraumatic  EENT: EOMI, moist mucous membranes  Lung: (+)tachypneic, (+)active cough on exam, (+) diffuse rhonchi throughout. pulse ox: 95% on RA  CV: regular rate, regular rhythm  Abd: soft, non-tender, non-distended  MSK: No edema, no visible deformities, full range of motion in all 4 extremities  Neuro: tired appearing, easily arousable, moving extremities equally  Skin: No obvious rash, no jaundice

## 2025-01-05 NOTE — H&P ADULT - HISTORY OF PRESENT ILLNESS
38 y/o F w/ PMH of metastatic breast cancer ER/[place] Neg, HER-2 pos on chemotherapy (mets to lung, liver, spleen, spine, bone and brain), gastritis w/ intermittent episodes of dark stool (follows w/ Dr. Rosado GI and is on PPI and octreotide daily) presented for worsening sob over the past few days.  Pt was seen in ED 1/2/25 for for weakness and sob at which time she was found to have Hb of 5 requiring prbc transfusion and positive for Flu A and started on tamiflu.  Pts respiratory status has worsened since then w/ productive cough but is unable to describe sputum.  Pt reports body aches and chills.  Pt denies sick contacts but has had many frequent hospital visits.  She denies abd pain, N/V, diarrhea, dysuria, cp, palpitations.

## 2025-01-05 NOTE — PATIENT PROFILE ADULT - FUNCTIONAL ASSESSMENT - DAILY ACTIVITY 2.
Patient is s/p pacer implant on 4-26-21.  He was brought in for removal of pressure dressing, Samy Bolden RN present.  No pressure dressing in place, incision is healing with no redness, swelling or drainage, skin edges well approximated dermabond is intact.  Groin incision also checked and WNL.  
2 = A lot of assistance

## 2025-01-05 NOTE — ED PROVIDER NOTE - PROGRESS NOTE DETAILS
work up significant for multifocal pneumonia, will admit. work up significant for multifocal pneumonia, will admit to tele. discussed with hospitalist.

## 2025-01-05 NOTE — H&P ADULT - NSHPLABSRESULTS_GEN_ALL_CORE
8.1    3.05  )-----------( 91       ( 05 Jan 2025 10:54 )             25.9         01-05    143  |  110[H]  |  13.2  ----------------------------<  88  3.3[L]   |  21.0[L]  |  0.44[L]    Ca    7.6[L]      05 Jan 2025 10:54    TPro  5.1[L]  /  Alb  2.4[L]  /  TBili  1.2  /  DBili  x   /  AST  37[H]  /  ALT  34[H]  /  AlkPhos  347[H]  01-05        < from: CT Angio Chest PE Protocol w/ IV Cont (01.05.25 @ 11:28) >    IMPRESSION:  No acute pulmonary embolism.    Pulmonary interstitial and likely alveolar edema with a small left   pleural effusion which is loculated towards the apex.    Widespreadpatchy airspace opacities bilaterally, increased since the   prior exam. Differential considerations include pneumonia versus   pulmonary edema with metastatic disease and treatment related change not   excluded    < end of copied text >

## 2025-01-05 NOTE — H&P ADULT - ASSESSMENT
38 y/o F w/ PMH of metastatic breast cancer ER/SD Neg, HER-2 pos on chemotherapy (mets to lung, liver, spleen, spine, bone and brain), ?GAVE related bleeding (follows w/ Dr. Rosado GI and is on PPI and octreotide daily) presented for worsening sob over the past few days.  Pt was seen in ED 1/2/25 for for weakness and sob at which time she was found to have Hb of 5 requiring prbc transfusion and positive for Flu A and started on tamiflu.  Pts respirostyr status has worsened since then w/ productive cough but is unable to desribe sputum.  Pt tachycardic, tachypneic w/ O2 at 90% ORA and placed on 2L NC w/ improved saturation.  Clinically toxic appearing speaking 2-3 word sentences w/ course scattered ronchi on exam and expiratory insp/exp scattered wheezing.   Initial w/u significant for leukopenia, thrombocytopenia, RVP +FlA, CTA chest negative for PE but found to have multifocal pna.   Recievied vanco/zosyn and 1550cc IVNS bolus in ED.  [facility] consulted and will admit for sepsis 2/2 multifocal PNA      Sepsis 2/2 Flu A w/ superimposed multifocal PNA  Acute hypoxic respiratory failure 2/2 Flu A w/ superimposed multifocal PNA   - Leukopenia w/ L-shift, tachycardic, tachypneic   - Minimal bandemia of 0.9% and normal lactate   - No neutropenia (ANC 2672)  - UA pending and Bcxs collected in ED  - RVP still positive for Flu AH3 and has been on tamiflu since 1/3 therefore will resume to complete 5 day course   - CTA negative for PE but w/ pulmonary/alveolar edema and small loculated L-effusion, wide spread patchy b/l air space disease increased from priar image   - Will also order sputum culture, mycoplasma pcr, strep UAg and legionella UAg   - PJP considered given pt is immunocompromised however clinically does not fit typical PJP presentation but if suspicion increases would w/u and consult ID   - Given pt bronchospastic w/ flu A infection w/ superimposed multifocal pna will start on IV steroid regimen and taper as tolerated   - s/p vanco/zosyn in ED and will continue empirically pending infectious w/u   - Hold off on further IVFs given pulm edema and small L effusion noted on CT chest and if will give 1x dose of IV lasix   - Monitor CBC w/ diff and temperature   - Low threshold to upgrade to SDU or get micu consult if decompensates       Chronic normocytic anemia 2/2 metastatic breast cancer on chemo and 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   Thrombocytopenia likely 2/2 sepsis   Breast cancer ER/SD Neg, HER-2 pos on chemotherapy w/ mets to lung, liver, spleen, spine, bone and brain  - Baseline Hb ranges 8-9    - s/p prbc transfusion on 1/2 for Hb5 w/ improvement to 8 and today remains unchanged    - c/w octreotide and protonix for possible gastric antral vascular ectasia related bleeding   - Plts likely low from sepsis and no intervention required at this time  - c/w pregabalin, methylphenidate (confirmed on ISTOP Reference #: [id number])  - Reports being on methadone for pain but last 30 day script on istop dispensed 09/19/24  - Is on po dilaudid 4mg prn w/ last script dispensed 12/04/24   - Will c/w IV dilaudid for analgesia and if needed consult pain management    - Monitor CBC and transfuse for Hb<7 and plts<20K in setting of sepsis   - [facility] consulted       Hypokalemia   - Will supplement   - Maintain K>4  - Check magnesium level   - Monitor on telemetry       Transaminitis w/ cholestatic predominance   - ALP likely from mets to bone but follow GGT to confirm   - AST/ALT relatively unchanged from prior  - Tibili normal, benign abd exam and asymptomatic   - RUQ US ordered   - Trend LFTs       VTE ppx: avoid chemical VTE ppx given known brain mets, place on SCDs instead     Dispo: Acute.  Low threshold for upgrade to VINAY or MICU consult.

## 2025-01-05 NOTE — ED ADULT NURSE NOTE - NSFALLHARMRISKINTERV_ED_ALL_ED
Assistance OOB with selected safe patient handling equipment if applicable/Assistance with ambulation/Communicate risk of Fall with Harm to all staff, patient, and family/Encourage patient to sit up slowly, dangle for a short time, stand at bedside before walking/Monitor gait and stability/Monitor for mental status changes and reorient to person, place, and time, as needed/Provide patient with walking aids/Provide visual cue: red socks, yellow wristband, yellow gown, etc/Reinforce activity limits and safety measures with patient and family/Review medications for side effects contributing to fall risk/Toileting schedule using arm’s reach rule for commode and bathroom/Use of alarms - bed, stretcher, chair and/or video monitoring/Bed in lowest position, wheels locked, appropriate side rails in place/Call bell, personal items and telephone in reach/Instruct patient to call for assistance before getting out of bed/chair/stretcher/Non-slip footwear applied when patient is off stretcher/[place] to call system/Physically safe environment - no spills, clutter or unnecessary equipment/Purposeful Proactive Rounding/Room/bathroom lighting operational, light cord in reach

## 2025-01-06 ENCOUNTER — RESULT REVIEW (OUTPATIENT)
Age: 40
End: 2025-01-06

## 2025-01-06 LAB
ACANTHOCYTES BLD QL SMEAR: SLIGHT — SIGNIFICANT CHANGE UP
ANION GAP SERPL CALC-SCNC: 14 MMOL/L — SIGNIFICANT CHANGE UP (ref 5–17)
ANISOCYTOSIS BLD QL: SLIGHT — SIGNIFICANT CHANGE UP
APPEARANCE UR: CLEAR — SIGNIFICANT CHANGE UP
BACTERIA # UR AUTO: ABNORMAL /HPF
BASE EXCESS BLDA CALC-SCNC: -4.4 MMOL/L — LOW (ref -2–3)
BASOPHILS # BLD AUTO: 0 K/UL — SIGNIFICANT CHANGE UP (ref 0–0.2)
BASOPHILS NFR BLD AUTO: 0 % — SIGNIFICANT CHANGE UP (ref 0–2)
BILIRUB UR-MCNC: NEGATIVE — SIGNIFICANT CHANGE UP
BLOOD GAS COMMENTS ARTERIAL: SIGNIFICANT CHANGE UP
BUN SERPL-MCNC: 10.3 MG/DL — SIGNIFICANT CHANGE UP (ref 8–20)
CALCIUM SERPL-MCNC: 7.2 MG/DL — LOW (ref 8.4–10.5)
CHLORIDE SERPL-SCNC: 104 MMOL/L — SIGNIFICANT CHANGE UP (ref 96–108)
CO2 SERPL-SCNC: 20 MMOL/L — LOW (ref 22–29)
COLOR SPEC: YELLOW — SIGNIFICANT CHANGE UP
CREAT SERPL-MCNC: 0.49 MG/DL — LOW (ref 0.5–1.3)
CULTURE RESULTS: ABNORMAL
CULTURE RESULTS: SIGNIFICANT CHANGE UP
DACRYOCYTES BLD QL SMEAR: SLIGHT — SIGNIFICANT CHANGE UP
DIFF PNL FLD: ABNORMAL
EGFR: 124 ML/MIN/1.73M2 — SIGNIFICANT CHANGE UP
ELLIPTOCYTES BLD QL SMEAR: SLIGHT — SIGNIFICANT CHANGE UP
EOSINOPHIL # BLD AUTO: 0 K/UL — SIGNIFICANT CHANGE UP (ref 0–0.5)
EOSINOPHIL NFR BLD AUTO: 0 % — SIGNIFICANT CHANGE UP (ref 0–6)
GAS PNL BLDA: SIGNIFICANT CHANGE UP
GAS PNL BLDA: SIGNIFICANT CHANGE UP
GGT SERPL-CCNC: 226 U/L — HIGH (ref 8–40)
GIANT PLATELETS BLD QL SMEAR: PRESENT — SIGNIFICANT CHANGE UP
GLUCOSE SERPL-MCNC: 203 MG/DL — HIGH (ref 70–99)
GLUCOSE UR QL: NEGATIVE MG/DL — SIGNIFICANT CHANGE UP
GRAM STN FLD: ABNORMAL
GRAM STN FLD: ABNORMAL
HCO3 BLDA-SCNC: 20 MMOL/L — LOW (ref 21–28)
HCT VFR BLD CALC: 22.3 % — LOW (ref 34.5–45)
HCT VFR BLD CALC: 22.7 % — LOW (ref 34.5–45)
HGB BLD-MCNC: 7.1 G/DL — LOW (ref 11.5–15.5)
HGB BLD-MCNC: 7.1 G/DL — LOW (ref 11.5–15.5)
HOROWITZ INDEX BLDA+IHG-RTO: SIGNIFICANT CHANGE UP
KETONES UR-MCNC: NEGATIVE MG/DL — SIGNIFICANT CHANGE UP
LEUKOCYTE ESTERASE UR-ACNC: ABNORMAL
LYMPHOCYTES # BLD AUTO: 0.14 K/UL — LOW (ref 1–3.3)
LYMPHOCYTES # BLD AUTO: 5.3 % — LOW (ref 13–44)
MAGNESIUM SERPL-MCNC: 1.8 MG/DL — SIGNIFICANT CHANGE UP (ref 1.6–2.6)
MANUAL SMEAR VERIFICATION: SIGNIFICANT CHANGE UP
MCHC RBC-ENTMCNC: 29.8 PG — SIGNIFICANT CHANGE UP (ref 27–34)
MCHC RBC-ENTMCNC: 30 PG — SIGNIFICANT CHANGE UP (ref 27–34)
MCHC RBC-ENTMCNC: 31.3 G/DL — LOW (ref 32–36)
MCHC RBC-ENTMCNC: 31.8 G/DL — LOW (ref 32–36)
MCV RBC AUTO: 93.7 FL — SIGNIFICANT CHANGE UP (ref 80–100)
MCV RBC AUTO: 95.8 FL — SIGNIFICANT CHANGE UP (ref 80–100)
METAMYELOCYTES # FLD: 1.8 % — HIGH (ref 0–0)
METAMYELOCYTES NFR BLD: 1.8 % — HIGH (ref 0–0)
METHOD TYPE: SIGNIFICANT CHANGE UP
MONOCYTES # BLD AUTO: 0.14 K/UL — SIGNIFICANT CHANGE UP (ref 0–0.9)
MONOCYTES NFR BLD AUTO: 5.3 % — SIGNIFICANT CHANGE UP (ref 2–14)
MRSA PCR RESULT.: SIGNIFICANT CHANGE UP
MSSA DNA SPEC QL NAA+PROBE: SIGNIFICANT CHANGE UP
NEUTROPHILS # BLD AUTO: 2.31 K/UL — SIGNIFICANT CHANGE UP (ref 1.8–7.4)
NEUTROPHILS NFR BLD AUTO: 86.7 % — HIGH (ref 43–77)
NITRITE UR-MCNC: NEGATIVE — SIGNIFICANT CHANGE UP
NRBC # BLD: 3 /100 WBCS — HIGH (ref 0–0)
NRBC # BLD: 8 /100 WBCS — HIGH (ref 0–0)
NRBC BLD-RTO: 3 /100 WBCS — HIGH (ref 0–0)
NRBC BLD-RTO: 8 /100 WBCS — HIGH (ref 0–0)
NT-PROBNP SERPL-SCNC: 899 PG/ML — HIGH (ref 0–300)
OVALOCYTES BLD QL SMEAR: SLIGHT — SIGNIFICANT CHANGE UP
PCO2 BLDA: 31 MMHG — LOW (ref 32–45)
PH BLDA: 7.42 — SIGNIFICANT CHANGE UP (ref 7.35–7.45)
PH UR: 6 — SIGNIFICANT CHANGE UP (ref 5–8)
PLAT MORPH BLD: NORMAL — SIGNIFICANT CHANGE UP
PLATELET # BLD AUTO: 66 K/UL — LOW (ref 150–400)
PLATELET # BLD AUTO: 77 K/UL — LOW (ref 150–400)
PO2 BLDA: 164 MMHG — HIGH (ref 83–108)
POIKILOCYTOSIS BLD QL AUTO: SLIGHT — SIGNIFICANT CHANGE UP
POLYCHROMASIA BLD QL SMEAR: SLIGHT — SIGNIFICANT CHANGE UP
POTASSIUM SERPL-MCNC: 3.8 MMOL/L — SIGNIFICANT CHANGE UP (ref 3.5–5.3)
POTASSIUM SERPL-SCNC: 3.8 MMOL/L — SIGNIFICANT CHANGE UP (ref 3.5–5.3)
PROT UR-MCNC: SIGNIFICANT CHANGE UP MG/DL
RBC # BLD: 2.37 M/UL — LOW (ref 3.8–5.2)
RBC # BLD: 2.38 M/UL — LOW (ref 3.8–5.2)
RBC # FLD: 25.4 % — HIGH (ref 10.3–14.5)
RBC # FLD: 25.7 % — HIGH (ref 10.3–14.5)
RBC BLD AUTO: ABNORMAL
RBC CASTS # UR COMP ASSIST: 5 /HPF — HIGH (ref 0–4)
S AUREUS DNA NOSE QL NAA+PROBE: DETECTED
SAO2 % BLDA: 98.9 % — HIGH (ref 94–98)
SCHISTOCYTES BLD QL AUTO: SLIGHT — SIGNIFICANT CHANGE UP
SODIUM SERPL-SCNC: 138 MMOL/L — SIGNIFICANT CHANGE UP (ref 135–145)
SP GR SPEC: 1.01 — SIGNIFICANT CHANGE UP (ref 1–1.03)
SPECIMEN SOURCE: SIGNIFICANT CHANGE UP
SQUAMOUS # UR AUTO: 2 /HPF — SIGNIFICANT CHANGE UP (ref 0–5)
UROBILINOGEN FLD QL: 1 MG/DL — SIGNIFICANT CHANGE UP (ref 0.2–1)
VARIANT LYMPHS # BLD: 0.9 % — SIGNIFICANT CHANGE UP (ref 0–6)
VARIANT LYMPHS NFR BLD MANUAL: 0.9 % — SIGNIFICANT CHANGE UP (ref 0–6)
WBC # BLD: 2.66 K/UL — LOW (ref 3.8–10.5)
WBC # BLD: 2.96 K/UL — LOW (ref 3.8–10.5)
WBC # FLD AUTO: 2.66 K/UL — LOW (ref 3.8–10.5)
WBC # FLD AUTO: 2.96 K/UL — LOW (ref 3.8–10.5)
WBC UR QL: 6 /HPF — HIGH (ref 0–5)

## 2025-01-06 PROCEDURE — 99233 SBSQ HOSP IP/OBS HIGH 50: CPT

## 2025-01-06 PROCEDURE — 93010 ELECTROCARDIOGRAM REPORT: CPT

## 2025-01-06 PROCEDURE — G0545: CPT

## 2025-01-06 PROCEDURE — 76705 ECHO EXAM OF ABDOMEN: CPT | Mod: 26

## 2025-01-06 PROCEDURE — 99223 1ST HOSP IP/OBS HIGH 75: CPT

## 2025-01-06 PROCEDURE — 71045 X-RAY EXAM CHEST 1 VIEW: CPT | Mod: 26,76

## 2025-01-06 PROCEDURE — 99223 1ST HOSP IP/OBS HIGH 75: CPT | Mod: GC

## 2025-01-06 RX ORDER — ALBUTEROL 90 MCG
10 AEROSOL REFILL (GRAM) INHALATION
Qty: 100 | Refills: 0 | Status: DISCONTINUED | OUTPATIENT
Start: 2025-01-06 | End: 2025-01-07

## 2025-01-06 RX ORDER — METHYLPREDNISOLONE ACETATE 40 MG/ML
40 VIAL (ML) INJECTION ONCE
Refills: 0 | Status: COMPLETED | OUTPATIENT
Start: 2025-01-06 | End: 2025-01-06

## 2025-01-06 RX ORDER — ACETYLCYSTEINE 200 MG/ML
4 VIAL (ML) MISCELLANEOUS EVERY 6 HOURS
Refills: 0 | Status: DISCONTINUED | OUTPATIENT
Start: 2025-01-06 | End: 2025-01-31

## 2025-01-06 RX ORDER — OSELTAMIVIR PHOSPHATE 75 MG/1
75 CAPSULE ORAL
Refills: 0 | Status: COMPLETED | OUTPATIENT
Start: 2025-01-06 | End: 2025-01-10

## 2025-01-06 RX ORDER — NAFCILLIN INJECTION 2 G/2G
2 POWDER, FOR SOLUTION INTRAMUSCULAR; INTRAMUSCULAR; INTRAVENOUS EVERY 4 HOURS
Refills: 0 | Status: DISCONTINUED | OUTPATIENT
Start: 2025-01-06 | End: 2025-01-31

## 2025-01-06 RX ORDER — PANTOPRAZOLE 20 MG/1
40 TABLET, DELAYED RELEASE ORAL EVERY 12 HOURS
Refills: 0 | Status: DISCONTINUED | OUTPATIENT
Start: 2025-01-06 | End: 2025-01-31

## 2025-01-06 RX ORDER — ANTISEPTIC SURGICAL SCRUB 0.04 MG/ML
1 SOLUTION TOPICAL DAILY
Refills: 0 | Status: DISCONTINUED | OUTPATIENT
Start: 2025-01-06 | End: 2025-01-31

## 2025-01-06 RX ORDER — HYDROMORPHONE HYDROCHLORIDE 4 MG/ML
0.5 INJECTION, SOLUTION INTRAMUSCULAR; INTRAVENOUS; SUBCUTANEOUS ONCE
Refills: 0 | Status: DISCONTINUED | OUTPATIENT
Start: 2025-01-06 | End: 2025-01-06

## 2025-01-06 RX ORDER — ALBUMIN HUMAN 50 G/1000ML
50 SOLUTION INTRAVENOUS
Refills: 0 | Status: COMPLETED | OUTPATIENT
Start: 2025-01-06 | End: 2025-01-06

## 2025-01-06 RX ORDER — ACETAMINOPHEN 160 MG/5ML
1000 SUSPENSION ORAL ONCE
Refills: 0 | Status: COMPLETED | OUTPATIENT
Start: 2025-01-06 | End: 2025-01-06

## 2025-01-06 RX ADMIN — HYDROMORPHONE HYDROCHLORIDE 0.2 MILLIGRAM(S): 4 INJECTION, SOLUTION INTRAMUSCULAR; INTRAVENOUS; SUBCUTANEOUS at 00:14

## 2025-01-06 RX ADMIN — OCTREOTIDE ACETATE 100 MICROGRAM(S): 1000 INJECTION INTRAVENOUS; SUBCUTANEOUS at 06:18

## 2025-01-06 RX ADMIN — Medication 4 MG/HR: at 08:59

## 2025-01-06 RX ADMIN — Medication 40 MILLIGRAM(S): at 21:06

## 2025-01-06 RX ADMIN — HYDROMORPHONE HYDROCHLORIDE 1 MILLIGRAM(S): 4 INJECTION, SOLUTION INTRAMUSCULAR; INTRAVENOUS; SUBCUTANEOUS at 16:37

## 2025-01-06 RX ADMIN — HYDROMORPHONE HYDROCHLORIDE 1 MILLIGRAM(S): 4 INJECTION, SOLUTION INTRAMUSCULAR; INTRAVENOUS; SUBCUTANEOUS at 09:31

## 2025-01-06 RX ADMIN — HYDROMORPHONE HYDROCHLORIDE 0.5 MILLIGRAM(S): 4 INJECTION, SOLUTION INTRAMUSCULAR; INTRAVENOUS; SUBCUTANEOUS at 05:49

## 2025-01-06 RX ADMIN — HYDROMORPHONE HYDROCHLORIDE 0.2 MILLIGRAM(S): 4 INJECTION, SOLUTION INTRAMUSCULAR; INTRAVENOUS; SUBCUTANEOUS at 12:09

## 2025-01-06 RX ADMIN — HYDROMORPHONE HYDROCHLORIDE 0.2 MILLIGRAM(S): 4 INJECTION, SOLUTION INTRAMUSCULAR; INTRAVENOUS; SUBCUTANEOUS at 13:09

## 2025-01-06 RX ADMIN — HYDROMORPHONE HYDROCHLORIDE 1 MILLIGRAM(S): 4 INJECTION, SOLUTION INTRAMUSCULAR; INTRAVENOUS; SUBCUTANEOUS at 02:05

## 2025-01-06 RX ADMIN — HYDROMORPHONE HYDROCHLORIDE 0.2 MILLIGRAM(S): 4 INJECTION, SOLUTION INTRAMUSCULAR; INTRAVENOUS; SUBCUTANEOUS at 21:47

## 2025-01-06 RX ADMIN — HYDROMORPHONE HYDROCHLORIDE 2 MILLIGRAM(S): 4 INJECTION, SOLUTION INTRAMUSCULAR; INTRAVENOUS; SUBCUTANEOUS at 21:50

## 2025-01-06 RX ADMIN — VANCOMYCIN HYDROCHLORIDE 250 MILLIGRAM(S): KIT at 11:59

## 2025-01-06 RX ADMIN — ACETAMINOPHEN 400 MILLIGRAM(S): 160 SUSPENSION ORAL at 02:27

## 2025-01-06 RX ADMIN — Medication 40 MILLIGRAM(S): at 06:18

## 2025-01-06 RX ADMIN — OCTREOTIDE ACETATE 100 MICROGRAM(S): 1000 INJECTION INTRAVENOUS; SUBCUTANEOUS at 17:29

## 2025-01-06 RX ADMIN — NAFCILLIN INJECTION 200 GRAM(S): 2 POWDER, FOR SOLUTION INTRAMUSCULAR; INTRAMUSCULAR; INTRAVENOUS at 23:18

## 2025-01-06 RX ADMIN — PANTOPRAZOLE 40 MILLIGRAM(S): 20 TABLET, DELAYED RELEASE ORAL at 17:26

## 2025-01-06 RX ADMIN — Medication 40 MILLIGRAM(S): at 13:01

## 2025-01-06 RX ADMIN — HYDROMORPHONE HYDROCHLORIDE 1 MILLIGRAM(S): 4 INJECTION, SOLUTION INTRAMUSCULAR; INTRAVENOUS; SUBCUTANEOUS at 15:37

## 2025-01-06 RX ADMIN — Medication 20 MILLIGRAM(S): at 08:11

## 2025-01-06 RX ADMIN — Medication 4 MG/HR: at 06:28

## 2025-01-06 RX ADMIN — ACETAMINOPHEN 400 MILLIGRAM(S): 160 SUSPENSION ORAL at 08:18

## 2025-01-06 RX ADMIN — PREGABALIN CAPSULES, CV 200 MILLIGRAM(S): 225 CAPSULE ORAL at 21:06

## 2025-01-06 RX ADMIN — PREGABALIN CAPSULES, CV 200 MILLIGRAM(S): 225 CAPSULE ORAL at 15:32

## 2025-01-06 RX ADMIN — OSELTAMIVIR PHOSPHATE 75 MILLIGRAM(S): 75 CAPSULE ORAL at 15:33

## 2025-01-06 RX ADMIN — HYDROMORPHONE HYDROCHLORIDE 2 MILLIGRAM(S): 4 INJECTION, SOLUTION INTRAMUSCULAR; INTRAVENOUS; SUBCUTANEOUS at 22:50

## 2025-01-06 RX ADMIN — ACETAMINOPHEN 1000 MILLIGRAM(S): 160 SUSPENSION ORAL at 09:18

## 2025-01-06 RX ADMIN — HYDROMORPHONE HYDROCHLORIDE 0.5 MILLIGRAM(S): 4 INJECTION, SOLUTION INTRAMUSCULAR; INTRAVENOUS; SUBCUTANEOUS at 04:49

## 2025-01-06 RX ADMIN — Medication 4 MILLILITER(S): at 02:07

## 2025-01-06 RX ADMIN — HYDROMORPHONE HYDROCHLORIDE 1 MILLIGRAM(S): 4 INJECTION, SOLUTION INTRAMUSCULAR; INTRAVENOUS; SUBCUTANEOUS at 01:35

## 2025-01-06 RX ADMIN — HYDROMORPHONE HYDROCHLORIDE 1 MILLIGRAM(S): 4 INJECTION, SOLUTION INTRAMUSCULAR; INTRAVENOUS; SUBCUTANEOUS at 10:31

## 2025-01-06 RX ADMIN — OSELTAMIVIR PHOSPHATE 75 MILLIGRAM(S): 75 CAPSULE ORAL at 21:06

## 2025-01-06 RX ADMIN — ALBUMIN HUMAN 50 MILLILITER(S): 50 SOLUTION INTRAVENOUS at 03:52

## 2025-01-06 RX ADMIN — PIPERACILLIN SODIUM AND TAZOBACTAM SODIUM 25 GRAM(S): 2; 250 INJECTION, POWDER, FOR SOLUTION INTRAVENOUS at 08:12

## 2025-01-06 RX ADMIN — NAFCILLIN INJECTION 200 GRAM(S): 2 POWDER, FOR SOLUTION INTRAMUSCULAR; INTRAMUSCULAR; INTRAVENOUS at 18:27

## 2025-01-06 RX ADMIN — Medication 40 MILLIGRAM(S): at 01:35

## 2025-01-06 RX ADMIN — ACETAMINOPHEN 1000 MILLIGRAM(S): 160 SUSPENSION ORAL at 02:57

## 2025-01-06 RX ADMIN — HYDROMORPHONE HYDROCHLORIDE 0.2 MILLIGRAM(S): 4 INJECTION, SOLUTION INTRAMUSCULAR; INTRAVENOUS; SUBCUTANEOUS at 20:59

## 2025-01-06 RX ADMIN — NAFCILLIN INJECTION 200 GRAM(S): 2 POWDER, FOR SOLUTION INTRAMUSCULAR; INTRAMUSCULAR; INTRAVENOUS at 15:03

## 2025-01-06 RX ADMIN — Medication 4 MG/HR: at 22:17

## 2025-01-06 RX ADMIN — Medication 4 MG/HR: at 14:24

## 2025-01-06 RX ADMIN — ALBUMIN HUMAN 50 MILLILITER(S): 50 SOLUTION INTRAVENOUS at 02:27

## 2025-01-06 RX ADMIN — Medication 20 MILLIGRAM(S): at 02:26

## 2025-01-06 RX ADMIN — Medication 1.25 MILLIGRAM(S): at 02:07

## 2025-01-06 RX ADMIN — ACETAMINOPHEN, DIPHENHYDRAMINE HCL, PHENYLEPHRINE HCL 3 MILLIGRAM(S): 325; 25; 5 TABLET ORAL at 21:06

## 2025-01-06 RX ADMIN — PANTOPRAZOLE 40 MILLIGRAM(S): 20 TABLET, DELAYED RELEASE ORAL at 06:18

## 2025-01-06 NOTE — CONSULT NOTE ADULT - SUBJECTIVE AND OBJECTIVE BOX
SUBJECTIVE    BRIEF HOSPITAL COURSE SUMMARY: 38 y/o F w/ PMH of metastatic breast cancer ER/[place] Neg, HER-2 pos on chemotherapy (last dose 12/26/24) (mets to lung, liver, spleen, spine, bone and brain), gastritis w/ intermittent episodes of dark stool (follows w/ Dr. Rosado GI and is on PPI and octreotide daily) presented for worsening sob over the past few days.  Pt was seen in ED 1/2/25 for for weakness and sob at which time she was found to have Hb of 5 requiring prbc transfusion and positive for Flu A and started on tamiflu.  Pts respiratory status has worsened since then w/ productive cough but is unable to describe sputum.     LAST 24 HOURS: Patient was upgraded to SDU after being noted to be toxic appearing with severely increased WOB. Patient was desating, and diffusely wheezing. Patient started on BiPAP and continuous nebulization. Pulmonology asked to evaluate for acute hypoxic respiratory failure.     TODAY: Pt seen at bedside this AM. Pt states that she feels better since BiPAP was initiated. Reports that she has had respiratory symptoms such as cough and congestion since mid december but never fully recovered. denies smoking hx, used to work as a nurse. complaining of HA and inability to sleep.  Offers no other acute complaints & ROS otherwise negative.     OBJECTIVE  PHYSICAL EXAM:  GENERAL: ill appearing female, BiPAP mask in place, intermittently noted to have increased WOB with conversation  HEENT: Atraumatic, normocephalic, non-icteric,   NEURO: A&Ox3, no focal deficits, moving all extremities spontaneously, no dysarthria, t  PSYCH: Normal affect, calm, appropriate insight and judgment, fluent speech  LUNGS: Diffused rhonchi noted in all lung fields, equal air entry and chest rise, no appreciable crepitus or wheezing  HEART: RRR, no murmur appreciated  ABD: Soft, non-tender, non-distended, no organomegaly, no appreciable masses, +bs  EXTREMITIES: Nontender, no clubbing, cyanosis, or edema     Vital Signs Last 24 Hrs  T(C): 36.7 (06 Jan 2025 13:15), Max: 36.9 (05 Jan 2025 16:07)  T(F): 98.1 (06 Jan 2025 13:15), Max: 98.5 (05 Jan 2025 16:07)  HR: 105 (06 Jan 2025 14:00) (77 - 109)  BP: 99/51 (06 Jan 2025 14:00) (97/62 - 119/51)  BP(mean): 87 (06 Jan 2025 14:00) (65 - 87)  RR: 17 (06 Jan 2025 14:00) (15 - 22)  SpO2: 99% (06 Jan 2025 14:00) (95% - 100%)    Parameters below as of 06 Jan 2025 14:00  Patient On (Oxygen Delivery Method): BiPAP/CPAP            MEDICATIONS  (STANDING):  acetylcysteine 10%  Inhalation 4 milliLiter(s) Inhalation every 6 hours  albuterol Continuous Nebulization (Vibrating Mesh Nebulizer) 10 mG/Hr (4 mL/Hr) Continuous Inhalation. <Continuous>  chlorhexidine 2% Cloths 1 Application(s) Topical daily  levalbuterol Inhalation 1.25 milliGRAM(s) Inhalation every 6 hours  methylPREDNISolone sodium succinate Injectable 40 milliGRAM(s) IV Push every 8 hours  nafcillin  IVPB 2 Gram(s) IV Intermittent every 4 hours  octreotide  Injectable 100 MICROGram(s) SubCutaneous two times a day  oseltamivir 75 milliGRAM(s) Oral two times a day  oseltamivir 75 milliGRAM(s) Oral two times a day  pantoprazole  Injectable 40 milliGRAM(s) IV Push every 12 hours  pregabalin 200 milliGRAM(s) Oral every 8 hours    MEDICATIONS  (PRN):  acetaminophen     Tablet .. 650 milliGRAM(s) Oral every 6 hours PRN Temp greater or equal to 38C (100.4F), Mild Pain (1 - 3)  aluminum hydroxide/magnesium hydroxide/simethicone Suspension 30 milliLiter(s) Oral every 4 hours PRN Dyspepsia  benzonatate 100 milliGRAM(s) Oral every 8 hours PRN Cough  guaifenesin/dextromethorphan Oral Liquid 10 milliLiter(s) Oral every 4 hours PRN Cough  HYDROmorphone  Injectable 2 milliGRAM(s) IV Push every 3 hours PRN Severe Pain (7 - 10)  HYDROmorphone  Injectable 1 milliGRAM(s) IV Push every 3 hours PRN Moderate Pain (4 - 6)  HYDROmorphone  Injectable 0.2 milliGRAM(s) IV Push every 3 hours PRN break through pain  melatonin 3 milliGRAM(s) Oral at bedtime PRN Insomnia  ondansetron Injectable 4 milliGRAM(s) IV Push every 8 hours PRN Nausea and/or Vomiting    Allergies    pertuzumab (Other (Severe))  Perjeta (Other (Severe))    Intolerances        LABS:                        7.1    2.96  )-----------( 66       ( 06 Jan 2025 07:36 )             22.7     01-06    138  |  104  |  10.3  ----------------------------<  203[H]  3.8   |  20.0[L]  |  0.49[L]    Ca    7.2[L]      06 Jan 2025 07:36  Mg     1.8     01-06    TPro  5.1[L]  /  Alb  2.4[L]  /  TBili  1.2  /  DBili  x   /  AST  37[H]  /  ALT  34[H]  /  AlkPhos  347[H]  01-05    PT/INR - ( 05 Jan 2025 10:54 )   PT: 16.8 sec;   INR: 1.45 ratio         PTT - ( 05 Jan 2025 10:54 )  PTT:37.2 sec  Urinalysis Basic - ( 06 Jan 2025 07:36 )    Color: x / Appearance: x / SG: x / pH: x  Gluc: 203 mg/dL / Ketone: x  / Bili: x / Urobili: x   Blood: x / Protein: x / Nitrite: x   Leuk Esterase: x / RBC: x / WBC x   Sq Epi: x / Non Sq Epi: x / Bacteria: x      CAPILLARY BLOOD GLUCOSE          CULTURE DATA:    Culture - Blood (collected 01-05-25 @ 10:54)  Source: .Blood BLOOD  Gram Stain (01-06-25 @ 08:58):    Growth in anaerobic bottle: Gram Positive Cocci in Clusters  Preliminary Report (01-06-25 @ 08:59):    Growth in anaerobic bottle: Gram Positive Cocci in Clusters    Direct identification is available within approximately 3-5    hours either by Blood Panel Multiplexed PCR or Direct    MALDI-TOF. Details: [email or web address]  Organism: Blood Culture PCR (01-06-25 @ 11:35)  Organism: Blood Culture PCR (01-06-25 @ 11:35)    Sensitivities:      Method Type: PCR      -  Methicillin SENSITIVE Staphylococcus aureus (MSSA): Detec Any isolate of Staphylococcus aureus from a blood culture is NOT considered a contaminant.    Culture - Urine (collected 01-05-25 @ 12:34)  Source: Clean Catch Clean Catch (Midstream)  Final Report (01-06-25 @ 14:51):    <10,000 CFU/mL Normal Urogenital Stacie    Culture - Sputum (collected 01-06-25 @ 01:34)  Source: .Sputum Sputum  Gram Stain (01-06-25 @ 13:49):    Moderate Squamous epithelial cells seen per low power field    Moderate polymorphonuclear leukocytes seen per low power field    Few Gram Negative Rods seen per oil power field    Few Gram positive cocci in pairs seen per oil power field    Rare Gram Positive Rods seen per oil power field    Rare Yeast like cells seen per oil power field        RADIOLOGY & ADDITIONAL TESTS:    < from: CT Angio Chest PE Protocol w/ IV Cont (01.05.25 @ 11:28) >  FINDINGS:    LUNGS AND LARGE AIRWAYS: Airway wall thickening.  Background pulmonary interstitial and likely alveolar edema.  Superimposed widespread bilateral patchy airspace opacities,   significantly increased since the prior exam, with some opacities nodular   in appearance  PLEURA: Small left pleural effusion which is loculated towards the apex.   No pneumothorax  VESSELS: No acute pulmonary embolism  HEART: Mild cardiomegaly No pericardial effusion.  MEDIASTINUM AND MARINA: No lymphadenopathy.  CHEST WALL AND LOWER NECK: Right chest Mediport catheter terminates   within the right atrium in close proximity to the tricuspid valve. Right   breast skin thickening, likely treatment related change.  VISUALIZED UPPER ABDOMEN: No significant acute abnormality.  BONES: Unchanged bone metastases. T12 and L1 vertebroplasty    IMPRESSION:  No acute pulmonary embolism.    Pulmonary interstitial and likely alveolar edema with a small left   pleural effusion which is loculated towards the apex.    Widespreadpatchy airspace opacities bilaterally, increased since the   prior exam. Differential considerations include pneumonia versus   pulmonary edema with metastatic disease and treatment related change not   excluded      < end of copied text >  < from: Xray Chest 1 View- PORTABLE-Urgent (01.05.25 @ 12:02) >  IMPRESSION:    Extensive airspace disease bilaterally with progression of findings since   the prior study. The overall appearance is consistent with multifocal   pneumonia. Associated pulmonary edema should also be considered.      < end of copied text >    < from: US Abdomen Upper Quadrant Right (01.06.25 @ 09:51) >  IMPRESSION:  *  Sludge and stones in the gallbladder.  *  No evidence of acute cholecystitis or biliary ductal dilatation.    < end of copied text >    < from: TTE W or WO Ultrasound Enhancing Agent (07.21.24 @ 15:40) >  CONCLUSIONS:      1. Left ventricular cavity is normal in size. Left ventricular wall thickness is normal. Left ventricular systolic function is normal with an ejection fraction visually estimated at 55 to 60 %.   2. Normal left ventricular diastolic function.   3. Normal right ventricular cavity size and normal right ventricular systolic function.   4. The left atrium is normal in size.   5. The right atrium is normal in size.   6. Trileaflet aortic valve with normal systolic excursion.   7. Structurally normal mitral valve with normal leaflet excursion.   8. Trace mitral regurgitation.   9. Mild tricuspid regurgitation.  10. Estimated pulmonary artery systolic pressure is 23 mmHg, normal pulmonary artery pressure.  11. The interatrial septum appears intact.  12. No pericardial effusion seen.    < end of copied text >

## 2025-01-06 NOTE — CONSULT NOTE ADULT - ATTENDING COMMENTS
Whitney Dave  40 y/o F with hx of metastatic breast ca to bone/liver/brain met, hx of L malignant pleural efusion s;/p chest tube, pneumonitis 2021, hx of perciardial effusion, present initial for hgb of 5, also found to have influenza A positive started tamiflu (on jan 2nd) and now back on Jan 5th for worsening dyspnea.  Overnight started on bipap, MICu saw the patient, and upgraded to stepdown.  Pulm is consulted for bipap management.   CT chest reviewed: could be superimposed bacteremia pneumonia, cannot rule out pulm edema vs cancer progression (noted that left effusion re-accumlated)   Patient reports feeling better on NIV, currently getting PRBC for hgb of 7.1, s/p lasix   On continuous neb, and started on antibiotics     Impression:   Acute hypoxic respiratory failure  Multifocal pneumonia vs. pulm edema,  possible cancer progression  Metastatic breast CA.   Anemia     Please follow up lactate, if uptrending, will need to hold continuous neb  Can try wean to high flow in afternoon and see how patient tolerates, relatively minimal FIO2 requirement at this time (40%)   Continue methylprd 40 mg q8h  Continue PRN diuresis, may need additional dose with transfusion  Continue antibiotics  Please send procal  Pending cultures, urinary  legionella  Pending echo   Pulm will follow

## 2025-01-06 NOTE — CONSULT NOTE ADULT - SUBJECTIVE AND OBJECTIVE BOX
[facility]  INFECTIOUS DISEASES at [place] / [place] / [place]  =======================================================                              Salazar Toro MD                              Professor Emeritus:  Dr Warner Mcintosh MD            Diplomates American Board of Internal Medicine & Infectious Diseases                                   Tel  [phone] Fax [phone]                                  Hospital Consult line:  [phone]  =======================================================      [id number]  NATIVIDAD MYERS    History supplemented from the chart. Patient unable to provide full medical history due to medical condition     CC: Patient is a 39y old  Female who presents with a chief complaint of sepsis (06 Jan 2025 04:26)      39y  Female with h/o metastatic breast cancer ER/[place] Neg, HER-2 + on chemotherapy (last dose 12/26/24) (mets to lung, liver, spleen, spine, bone and brain), gastritis w/ intermittent episodes of dark stool (follows w/ Dr. Rosado GI and is on PPI and octreotide daily). Patient presented 1/5 with c/o worsening SOB.  She was seen in ED 1/2/25 for for weakness and SOB at which time she was found to have Hb of 5 requiring PRBC transfusion and positive for Flu A and started on tamiflu and was discharged from the ED on 1/3/25.  Her respiratory status has worsened since then w/ productive cough associated with body aches and chills.  Denies sick contacts but has had many frequent hospital visits. Patient has been afebrile, no leukocytosis. Was placed on BIPAP for Worsening respiratory status. continued on Tamiflu. Vancomycn and Zosyn was added. Blood cultures with MSSA. ID input requested.       Past Medical & Surgical Hx:  H/O compression fracture of spine  Anxiety  Metastatic breast cancer      H/O pleural effusion  Pericardial effusion  S/P tonsillectomy  H/O chest tube placement 12/23/21  S/P pericardiocentesis 12/28/21      Social Hx:  Denies smoking      FAMILY HISTORY:  FH: CVA (cerebrovascular accident)      Allergies  pertuzumab (Other (Severe))  Perjeta (Other (Severe))       REVIEW OF SYSTEMS:  Limited, patient is on BIPAP  no complaints       Physical Exam:  GEN: Awake on BIPAP  HEENT: normocephalic and atraumatic.   NECK: Supple.   LUNGS: Course BS B/L.  HEART: RRR  ABDOMEN: Soft, NT, ND.  +BS.    : No CVA tenderness  EXTREMITIES: Without  edema.  MSK: No joint swelling  NEUROLOGIC: No Focal Deficits   SKIN: No rash  + Port    Height (cm): 157.5 (01-06 @ 04:22)  Weight (kg): 71.4 (01-06 @ 00:00)  BMI (kg/m2): 28.8 (01-06 @ 04:22)  BSA (m2): 1.73 (01-06 @ 04:22)    Vitals:  T(F): 98.1 (06 Jan 2025 13:15), Max: 98.5 (05 Jan 2025 16:07)  HR: 102 (06 Jan 2025 13:15)  BP: 110/56 (06 Jan 2025 13:15)  RR: 15 (06 Jan 2025 13:15)  SpO2: 99% (06 Jan 2025 13:15) (95% - 100%)  temp max in last 48H T(F): , Max: 98.6 (01-05-25 @ 10:33)    Current Antibiotics:  oseltamivir 75 milliGRAM(s) Oral two times a day  piperacillin/tazobactam IVPB.. 3.375 Gram(s) IV Intermittent every 8 hours  vancomycin  IVPB 1000 milliGRAM(s) IV Intermittent every 12 hours    Other medications:  acetylcysteine 10%  Inhalation 4 milliLiter(s) Inhalation every 6 hours  albuterol Continuous Nebulization (Vibrating Mesh Nebulizer) 10 mG/Hr Continuous Inhalation. <Continuous>  chlorhexidine 2% Cloths 1 Application(s) Topical daily  levalbuterol Inhalation 1.25 milliGRAM(s) Inhalation every 6 hours  methylPREDNISolone sodium succinate Injectable 40 milliGRAM(s) IV Push every 8 hours  octreotide  Injectable 100 MICROGram(s) SubCutaneous two times a day  pantoprazole  Injectable 40 milliGRAM(s) IV Push every 12 hours  pregabalin 200 milliGRAM(s) Oral every 8 hours                            7.1    2.96  )-----------( 66       ( 06 Jan 2025 07:36 )             22.7     01-06    138  |  104  |  10.3  ----------------------------<  203[H]  3.8   |  20.0[L]  |  0.49[L]    Ca    7.2[L]      06 Jan 2025 07:36  Mg     1.8     01-06    TPro  5.1[L]  /  Alb  2.4[L]  /  TBili  1.2  /  DBili  x   /  AST  37[H]  /  ALT  34[H]  /  AlkPhos  347[H]  01-05    RECENT CULTURES:  01-06 @ 01:34 .Sputum Sputum     Moderate Squamous epithelial cells seen per low power field  Moderate polymorphonuclear leukocytes seen per low power field  Few Gram Negative Rods seen per oil power field  Few Gram positive cocci in pairs seen per oil power field  Rare Gram Positive Rods seen per oil power field  Rare Yeast like cells seen per oil power field    01-05 @ 10:54 .Blood BLOOD Blood Culture PCR    Growth in anaerobic bottle: Gram Positive Cocci in Clusters  Direct identification is available within approximately 3-5  hours either by Blood Panel Multiplexed PCR or Direct  MALDI-TOF. Details: [email or web address]  Growth in anaerobic bottle: Gram Positive Cocci in Clusters      WBC Count: 2.96 K/uL (01-06-25 @ 07:36)  WBC Count: 2.66 K/uL (01-06-25 @ 06:00)  WBC Count: 2.97 K/uL (01-05-25 @ 19:27)  WBC Count: 3.05 K/uL (01-05-25 @ 10:54)  WBC Count: 7.82 K/uL (01-03-25 @ 03:59)  WBC Count: 4.86 K/uL (01-02-25 @ 17:51)    Creatinine: 0.49 mg/dL (01-06-25 @ 07:36)  Creatinine: 0.44 mg/dL (01-05-25 @ 10:54)  Creatinine: 0.39 mg/dL (01-02-25 @ 17:51)     SARS-CoV-2: NotDetec (01-05-25 @ 10:54)  SARS-CoV-2 Result: NotDetec (01-02-25 @ 19:20)    FluA/FluB/RSV/COVID PCR (01.02.25 @ 19:20)    SARS-CoV-2 Result: NotDetec: This Respiratory Panel uses polymerase chain reaction (PCR) to detect for  influenza A; influenza B; respiratory syncytial virus; and SARS-CoV-2.  Test results should be correlated with clinical presentation, patient  history, and epidemiology.   Influenza A Result: Detected   Influenza B Result: NotDetec   Resp Syn Virus Result: NotDetec          < from: US Abdomen Upper Quadrant Right (01.06.25 @ 09:51) >  ACC: [id number] EXAM:  US ABDOMEN RT UPR QUADRANT   ORDERED BY: SOTO DIAZ     PROCEDURE DATE:  01/06/2025      INTERPRETATION:  CLINICAL INFORMATION: Abnormal liver function tests.    COMPARISON: 9/8/2024    TECHNIQUE: Sonography of theright upper quadrant.    FINDINGS:  Liver: Normal in size and echogenicity. No focal lesions are identified.  Bile ducts: Normal caliber. Common bile duct measures 2 mm.  Gallbladder: Layering biliary sludge and stones. No localized tenderness,   wall thickening, pericholecystic fluid, or evidence of acute   cholecystitis.  Pancreas: Visualized portions are within normal limits.  Right kidney: 10.6 cm. No hydronephrosis.  Ascites: None.  IVC: Visualized portions are within normal limits.    IMPRESSION:  *  Sludge and stones in the gallbladder.  *  No evidence of acute cholecystitis or biliary ductal dilatation.    --- End of Report ---  < end of copied text >        < from: CT Angio Chest PE Protocol w/ IV Cont (01.05.25 @ 11:28) >    ACC: [id number] EXAM:  CT ANGIO CHEST PULM ART WAWIC   ORDERED BY: ENEDELIA CAMP     PROCEDURE DATE:  01/05/2025      INTERPRETATION:  CLINICAL INFORMATION: Shortness of breast. Metastatic   breast cancer    COMPARISON: CT chest 8/5/2024    CONTRAST/COMPLICATIONS:  IV Contrast: Omnipaque 350  43 cc administered   57 cc discarded  Oral Contrast: NONE    PROCEDURE:  CT Angiography of the Chest.  Sagittal and coronal reformats were performed as well as 3D (MIP)   reconstructions.    FINDINGS:    LUNGS AND LARGE AIRWAYS: Airway wall thickening.  Background pulmonary interstitial and likely alveolar edema.  Superimposed widespread bilateral patchy airspace opacities,   significantly increased since the prior exam, with some opacities nodular   in appearance  PLEURA: Small left pleural effusion which is loculated towards the apex.   No pneumothorax  VESSELS: No acute pulmonary embolism  HEART: Mild cardiomegaly No pericardial effusion.  MEDIASTINUM AND MARINA: No lymphadenopathy.  CHEST WALL AND LOWER NECK: Right chest Mediport catheter terminates   within the right atrium in close proximity to the tricuspid valve. Right   breast skin thickening, likely treatment related change.  VISUALIZED UPPER ABDOMEN: No significant acute abnormality.  BONES: Unchanged bone metastases. T12 and L1 vertebroplasty    IMPRESSION:  No acute pulmonary embolism.    Pulmonary interstitial and likely alveolar edema with a small left   pleural effusion which is loculated towards the apex.    Wide spread patchy airspace opacities bilaterally, increased since the   prior exam. Differential considerations include pneumonia versus   pulmonary edema with metastatic disease and treatment related change not   excluded    --- End of Report ---  < end of copied text >

## 2025-01-06 NOTE — PROGRESS NOTE ADULT - ASSESSMENT
40 y/o F w/ PMH of metastatic breast cancer ER/[place] Neg, HER-2 pos on chemotherapy (mets to lung, liver, spleen, spine, bone and brain), ?GAVE related bleeding (follows w/ Dr. Rosado GI and is on PPI and octreotide daily) presented for worsening sob over the past few days.  Pt was seen in ED 1/2/25 for for weakness and sob at which time she was found to have Hb of 5 requiring prbc transfusion and positive for Flu A and started on tamiflu.  Pts respirostyr status has worsened since then w/ productive cough but is unable to desribe sputum.  Pt tachycardic, tachypneic w/ O2 at 90% ORA and placed on 2L NC w/ improved saturation.  Clinically toxic appearing speaking 2-3 word sentences w/ course scattered ronchi on exam and expiratory insp/exp scattered wheezing.   Initial w/u significant for leukopenia, thrombocytopenia, RVP +FlA, CTA chest negative for PE but found to have multifocal pna.   Recievied vanco/zosyn and 1550cc IVNS bolus in ED.  [facility] consulted and will admit for sepsis 2/2 multifocal PNA      Sepsis 2/2 Flu A w/ superimposed multifocal PNA  Acute hypoxic respiratory failure 2/2 Flu A w/ superimposed multifocal PNA   - c/w tamiflu x 5 days    - CTA negative for PE but w/ pulmonary/alveolar edema and small loculated L-effusion, wide spread patchy b/l air space disease increased from priar image   - Will also order sputum culture, mycoplasma pcr, strep UAg and legionella UAg   - PJP considered given pt is immunocompromised however clinically does not fit typical PJP presentation but if suspicion increases would w/u and consult ID   - Given pt bronchospastic w/ flu A infection w/ superimposed multifocal pna will start on IV steroid regimen and taper as tolerated   - s/p vanco/zosyn in ED and will continue empirically pending infectious w/u   - Hold off on further IVFs given pulm edema and small L effusion noted on CT chest and if will give 1x dose of IV lasix   - Monitor CBC w/ diff and temperature   - Low threshold to upgrade to SDU or get micu consult if decompensates       Chronic normocytic anemia 2/2 metastatic breast cancer on chemo and 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   Thrombocytopenia likely 2/2 sepsis   Breast cancer ER/NH Neg, HER-2 pos on chemotherapy w/ mets to lung, liver, spleen, spine, bone and brain  - Baseline Hb ranges 8-9    - s/p prbc transfusion on 1/2 for Hb5 w/ improvement to 8 and today remains unchanged    - c/w octreotide and protonix for possible gastric antral vascular ectasia related bleeding   - Plts likely low from sepsis and no intervention required at this time  - c/w pregabalin, methylphenidate (confirmed on ISTOP Reference #: [id number])  - Reports being on methadone for pain but last 30 day script on istop dispensed 09/19/24  - Is on po dilaudid 4mg prn w/ last script dispensed 12/04/24   - Will c/w IV dilaudid for analgesia and if needed consult pain management    - Monitor CBC and transfuse for Hb<7 and plts<20K in setting of sepsis   - [facility] consulted       Hypokalemia   - Will supplement   - Maintain K>4  - Check magnesium level   - Monitor on telemetry       Transaminitis w/ cholestatic predominance   - ALP likely from mets to bone but follow GGT to confirm   - AST/ALT relatively unchanged from prior  - Tibili normal, benign abd exam and asymptomatic   - RUQ US ordered   - Trend LFTs       VTE ppx: avoid chemical VTE ppx given known brain mets, place on SCDs instead     Dispo: Acute. Anticipated LOS unclear     38 y/o F w/ PMH of metastatic breast cancer ER/[place] Neg, HER-2 pos on chemotherapy (mets to lung, liver, spleen, spine, bone and brain), ?GAVE related bleeding (follows w/ Dr. Rosado GI and is on PPI and octreotide daily) presented for worsening sob over the past few days.  Pt was seen in ED 1/2/25 for for weakness and sob at which time she was found to have Hb of 5 requiring prbc transfusion and positive for Flu A and started on tamiflu.  Pts respirostyr status has worsened since then w/ productive cough but is unable to desribe sputum.  Pt tachycardic, tachypneic w/ O2 at 90% ORA and placed on 2L NC w/ improved saturation.  Clinically toxic appearing speaking 2-3 word sentences w/ course scattered ronchi on exam and expiratory insp/exp scattered wheezing.   Initial w/u significant for leukopenia, thrombocytopenia, RVP +FlA, CTA chest negative for PE but found to have multifocal pna.   Recievied vanco/zosyn and 1550cc IVNS bolus in ED.  [facility] consulted and will admit for sepsis 2/2 multifocal PNA      Sepsis 2/2 Flu A w/ superimposed multifocal PNA  Acute hypoxic respiratory failure 2/2 Flu A w/ superimposed multifocal PNA     - CTA negative for PE but w/ pulmonary/alveolar edema and small loculated L-effusion, wide spread patchy b/l air space disease increased from prior image   - persistently positive RVP for flu A  - c/w tamiflu x 5 days  - f/u sputum culture, mycoplasma pcr, strep UAg and legionella UAg   - c/w IV steroids  - blood clx positive for MSSA, ID consulted  - c/w abx  - given additional lasix today, may require daily regimen  - f/u TTE  - trend CBC, BMP    Chronic normocytic anemia 2/2 metastatic breast cancer on chemo and 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   Thrombocytopenia likely 2/2 sepsis   Breast cancer ER/RI Neg, HER-2 pos on chemotherapy w/ mets to lung, liver, spleen, spine, bone and brain  - Baseline Hb ranges 8-9    - s/p prbc transfusion on 1/2 for Hb5 w/ improvement to 8     - hgb drop to 7.1 today, unclear cause as no obvious active bleeding   - c/w octreotide and protonix for now  - Plts likely low from sepsis and no intervention required at this time  - c/w pregabalin, methylphenidate (confirmed on ISTOP Reference #: [id number])  - Reports being on methadone for pain but last 30 day script on istop dispensed 09/19/24  - Is on po dilaudid 4mg prn w/ last script dispensed 12/04/24   - Will c/w IV dilaudid for analgesia and if needed consult pain management    - Monitor CBC and transfuse for Hb<7 and plts<20K in setting of sepsis   - [facility] consulted   - monitor on tele and     Transaminitis w/ cholestatic predominance   - ALP likely from mets to bone but follow GGT to confirm   - AST/ALT relatively unchanged from prior  - Tibili normal, benign abd exam and asymptomatic   - Trend LFTs     VTE ppx: SCDs, avoid chemical VTE ppx given known brain mets, place on SCDs instead     Dispo: Acute. Anticipated LOS unclear

## 2025-01-06 NOTE — CONSULT NOTE ADULT - ASSESSMENT
40 y/o F w/ PMH of metastatic breast cancer ER/[place] Neg, HER-2 pos on chemotherapy (last dose 12/26/24) (mets to lung, liver, spleen, spine, bone and brain), gastritis admitted for acute hypoxic respiratory failure 2/2 to influenza A with superimposed bacterial PNA.     Pulmonology consulted for acute worsening of respiratory status.     Problem List    #AHRF 2/2 Influenza A with superimposed bacterial PNA  #Metastatic breast cancer  #pancytopenia  #transaminitis     -Maintain tele/  - CTA w/ PE protocol negative for acute PE but + for multifocal PNA vs pulm edema with metastatic dz  - Last Echo 07/21/24, LVEF 55-60%, no valvular disease, heart failure or pHTN noted, however would consider repeat study to r/o chemotherapy associated heart dysfunction  - would continue diuresis pending repeat ECHO  - cw nafcillin and oseltamivir  - cw methylprednisolone  - ABG without signs of hypercapnia  - f/u procalcitonin  - repeat CXR and ABG in AM  - would trial patient off BiPAP and continuous nebulization with low threshold to resume  - HFNC 40 L/min, 50% FiO2, duonebs q6h   - Maintain low threshold to reconsult MICU should patient's clinical condition worsen further    Rest of care per primary team     40 y/o F w/ PMH of metastatic breast cancer ER/[place] Neg, HER-2 pos on chemotherapy (last dose 12/26/24) (mets to lung, liver, spleen, spine, bone and brain), gastritis admitted for acute hypoxic respiratory failure 2/2 to influenza A with superimposed bacterial PNA.     Pulmonology consulted for acute worsening of respiratory status.     Problem List    #AHRF 2/2 Influenza A with superimposed bacterial PNA  #Metastatic breast cancer  #pancytopenia  #transaminitis     -Maintain tele/  - CTA w/ PE protocol negative for acute PE but + for multifocal PNA vs pulm edema with metastatic dz  - Last Echo 07/21/24, LVEF 55-60%, no valvular disease, heart failure or pHTN noted, however would consider repeat study to r/o chemotherapy associated heart dysfunction  - would continue diuresis pending repeat ECHO  - cw nafcillin and oseltamivir  - cw methylprednisolone  - ABG without signs of hypercapnia  - f/u procalcitonin  - trend lactate  - repeat CXR and ABG in AM  - would trial patient off BiPAP and continuous nebulization with low threshold to resume  - HFNC 40 L/min, 50% FiO2, duonebs q6h   - Maintain low threshold to reconsult MICU should patient's clinical condition worsen further    Rest of care per primary team

## 2025-01-06 NOTE — CONSULT NOTE ADULT - SUBJECTIVE AND OBJECTIVE BOX
HPI: Patient is a 39y Female seen on consultation for the evaluation and management of metastatic breast cancer, ER/ID neg, Her-2 pos, originally diagnosed 10/2021, treated with Taxane/Herceptin, Enhertu, Kadcyla. Has known bone, liver, brain mets, prior radiation. Prior MRI with contrast that showed extensive disease, with left parasagittal, fronto parietal, bilateral hemispheric lesions, bilateral leptomeningeal enhancement.    Recently treated with hgb 5.0, flu+.  She presents to the hospital with worsening cough, nolasco, respiratory distress and fever.  She complains of abdominal pain. No n/v. She was on BIPAP at time of my visit.  Hgb 7.1, platelet count 66k. No bleeding noted.      PAST MEDICAL & SURGICAL HISTORY:  H/O compression fracture of spine      Anxiety      Metastatic breast cancer      H/O pleural effusion      Pericardial effusion      S/P tonsillectomy      H/O chest tube placement  12/23/21      S/P pericardiocentesis  12/28/21      MEDICATIONS  (STANDING):  acetylcysteine 10%  Inhalation 4 milliLiter(s) Inhalation every 6 hours  albuterol Continuous Nebulization (Vibrating Mesh Nebulizer) 10 mG/Hr (4 mL/Hr) Continuous Inhalation. <Continuous>  chlorhexidine 2% Cloths 1 Application(s) Topical daily  levalbuterol Inhalation 1.25 milliGRAM(s) Inhalation every 6 hours  methylPREDNISolone sodium succinate Injectable 40 milliGRAM(s) IV Push every 8 hours  nafcillin  IVPB 2 Gram(s) IV Intermittent every 4 hours  octreotide  Injectable 100 MICROGram(s) SubCutaneous two times a day  oseltamivir 75 milliGRAM(s) Oral two times a day  oseltamivir 75 milliGRAM(s) Oral two times a day  pantoprazole  Injectable 40 milliGRAM(s) IV Push every 12 hours  pregabalin 200 milliGRAM(s) Oral every 8 hours    MEDICATIONS  (PRN):  acetaminophen     Tablet .. 650 milliGRAM(s) Oral every 6 hours PRN Temp greater or equal to 38C (100.4F), Mild Pain (1 - 3)  aluminum hydroxide/magnesium hydroxide/simethicone Suspension 30 milliLiter(s) Oral every 4 hours PRN Dyspepsia  benzonatate 100 milliGRAM(s) Oral every 8 hours PRN Cough  guaifenesin/dextromethorphan Oral Liquid 10 milliLiter(s) Oral every 4 hours PRN Cough  HYDROmorphone  Injectable 2 milliGRAM(s) IV Push every 3 hours PRN Severe Pain (7 - 10)  HYDROmorphone  Injectable 1 milliGRAM(s) IV Push every 3 hours PRN Moderate Pain (4 - 6)  HYDROmorphone  Injectable 0.2 milliGRAM(s) IV Push every 3 hours PRN break through pain  melatonin 3 milliGRAM(s) Oral at bedtime PRN Insomnia  ondansetron Injectable 4 milliGRAM(s) IV Push every 8 hours PRN Nausea and/or Vomiting      Vital Signs Last 24 Hrs  T(C): 37.2 (06 Jan 2025 19:10), Max: 37.2 (06 Jan 2025 15:00)  T(F): 98.9 (06 Jan 2025 19:10), Max: 98.9 (06 Jan 2025 15:00)  HR: 118 (06 Jan 2025 22:00) (78 - 122)  BP: 102/57 (06 Jan 2025 22:00) (93/57 - 111/65)  BP(mean): 71 (06 Jan 2025 22:00) (64 - 87)  RR: 16 (06 Jan 2025 22:00) (15 - 22)  SpO2: 100% (06 Jan 2025 22:00) (95% - 100%)    Parameters below as of 06 Jan 2025 22:00  Patient On (Oxygen Delivery Method): nasal cannula, high flow  O2 Flow (L/min): 4  O2 Concentration (%): 50  	  PHYSICAL EXAM:  Constitutional:WD adequately nourished female, awake, alert  Neck:Supple  Respiratory: b/l rales  Cardiovascular:RRR normal S1S2  Gastrointestinal:soft, non tender, pos BS  Extremities:no edema  Neurological:Awake, alert         LABS:                          7.1    2.96  )-----------( 66       ( 06 Jan 2025 07:36 )      01-06    138  |  104  |  10.3  ----------------------------<  203[H]  3.8   |  20.0[L]  |  0.49[L]    Ca    7.2[L]      06 Jan 2025 07:36  Mg     1.8     01-06    TPro  5.1[L]  /  Alb  2.4[L]  /  TBili  1.2  /  DBili  x   /  AST  37[H]  /  ALT  34[H]  /  AlkPhos  347[H]  01-05             22.7

## 2025-01-06 NOTE — CONSULT NOTE ADULT - SUBJECTIVE AND OBJECTIVE BOX
Patient is a 39y old  Female who presents with a chief complaint of sepsis (2025 04:26)      BRIEF HOSPITAL COURSE:   This is a 38 y/o F w/ PMH of metastatic breast cancer ER/[place] Neg, HER-2 pos on chemotherapy (last dose 24) (mets to lung, liver, spleen, spine, bone and brain), gastritis w/ intermittent episodes of dark stool (follows w/ Dr. Rosado GI and is on PPI and octreotide daily) presented for worsening sob over the past few days.  Pt was seen in ED 25 for for weakness and sob at which time she was found to have Hb of 5 requiring prbc transfusion and positive for Flu A and started on tamiflu.  Pts respiratory status has worsened since then w/ productive cough but is unable to describe sputum.  Pt reports body aches and chills.  Pt denies sick contacts but has had many frequent hospital visits.  She denies abd pain, N/V, diarrhea, dysuria, cp, palpitations.      Events last 24 hours:   Patient with continued worsening respiratory status  into . MICU was consulted for new BiPap secondary to increased work of breathing.    PAST MEDICAL & SURGICAL HISTORY:  H/O compression fracture of spine  Anxiety  Metastatic breast cancer  H/O pleural effusion  Pericardial effusion  S/P tonsillectomy  H/O chest tube placement  21  S/P pericardiocentesis  21    Allergies  pertuzumab (Other (Severe))  Perjeta (Other (Severe))    Intolerances      FAMILY HISTORY:  FH: CVA (cerebrovascular accident)        Review of Systems:  CONSTITUTIONAL:+ fever, chills, fatigue  EYES: No eye pain, visual disturbances, or discharge  ENMT:  No difficulty hearing, tinnitus, vertigo; No sinus or throat pain  NECK: No pain or stiffness  RESPIRATORY: + cough,+ shortness of breath  CARDIOVASCULAR: + chest pain/tightness, no palpitations, dizziness, + leg swelling  GASTROINTESTINAL: No abdominal or epigastric pain. + nausea, no vomiting, or hematemesis; No diarrhea or constipation. No melena or hematochezia.  GENITOURINARY: No dysuria, frequency, hematuria, or incontinence  NEUROLOGICAL: No headaches, memory loss, loss of strength, numbness, or tremors  SKIN: No itching, burning, rashes, or lesions       Medications:  oseltamivir 75 milliGRAM(s) Oral two times a day  piperacillin/tazobactam IVPB.. 3.375 Gram(s) IV Intermittent every 8 hours  vancomycin  IVPB 1000 milliGRAM(s) IV Intermittent every 12 hours      acetylcysteine 10%  Inhalation 4 milliLiter(s) Inhalation every 6 hours  albuterol Continuous Nebulization (Vibrating Mesh Nebulizer) 5 mG/Hr Continuous Inhalation. <Continuous>  benzonatate 100 milliGRAM(s) Oral every 8 hours PRN  guaifenesin/dextromethorphan Oral Liquid 10 milliLiter(s) Oral every 4 hours PRN  levalbuterol Inhalation 1.25 milliGRAM(s) Inhalation every 6 hours    acetaminophen     Tablet .. 650 milliGRAM(s) Oral every 6 hours PRN  HYDROmorphone  Injectable 0.5 milliGRAM(s) IV Push once  HYDROmorphone  Injectable 2 milliGRAM(s) IV Push every 3 hours PRN  HYDROmorphone  Injectable 1 milliGRAM(s) IV Push every 3 hours PRN  HYDROmorphone  Injectable 0.2 milliGRAM(s) IV Push every 3 hours PRN  melatonin 3 milliGRAM(s) Oral at bedtime PRN  ondansetron Injectable 4 milliGRAM(s) IV Push every 8 hours PRN  pregabalin 200 milliGRAM(s) Oral every 8 hours        aluminum hydroxide/magnesium hydroxide/simethicone Suspension 30 milliLiter(s) Oral every 4 hours PRN  pantoprazole    Tablet 40 milliGRAM(s) Oral before breakfast      methylPREDNISolone sodium succinate Injectable 40 milliGRAM(s) IV Push every 8 hours  octreotide  Injectable 100 MICROGram(s) SubCutaneous two times a day                  ICU Vital Signs Last 24 Hrs  T(C): 36.6 (2025 03:15), Max: 37 (2025 10:33)  T(F): 97.9 (2025 03:15), Max: 98.6 (2025 10:33)  HR: 81 (2025 04:32) (76 - 104)  BP: 97/62 (2025 04:22) (97/62 - 119/51)  BP(mean): 74 (2025 04:22) (74 - 83)  RR: 16 (2025 04:22) (16 - 22)  SpO2: 99% (2025 04:32) (95% - 99%)    O2 Parameters below as of 2025 04:32  Patient On (Oxygen Delivery Method): BiPAP/CPAP          Vital Signs Last 24 Hrs  T(C): 36.6 (2025 03:15), Max: 37 (2025 10:33)  T(F): 97.9 (2025 03:15), Max: 98.6 (2025 10:33)  HR: 81 (2025 04:32) (76 - 104)  BP: 97/62 (2025 04:22) (97/62 - 119/51)  BP(mean): 74 (2025 04:22) (74 - 83)  RR: 16 (2025 04:22) (16 - 22)  SpO2: 99% (2025 04:32) (95% - 99%)    Parameters below as of 2025 04:32  Patient On (Oxygen Delivery Method): BiPAP/CPAP        ABG - ( 2025 02:28 )  pH, Arterial: 7.420 pH, Blood: x     /  pCO2: 31    /  pO2: 164   / HCO3: 20    / Base Excess: -4.4  /  SaO2: 98.9                I&O's Detail    2025 07:01  -  2025 04:43  --------------------------------------------------------  IN:    Albumin 25%  -  50 mL: 100 mL    IV PiggyBack: 100 mL    IV PiggyBack: 250 mL  Total IN: 450 mL    OUT:    Voided (mL): 800 mL  Total OUT: 800 mL    Total NET: -350 mL            LABS:                        8.2    2.97  )-----------( 93       ( 2025 19:27 )             26.6         143  |  110[H]  |  13.2  ----------------------------<  88  3.3[L]   |  21.0[L]  |  0.44[L]    Ca    7.6[L]      2025 10:54    TPro  5.1[L]  /  Alb  2.4[L]  /  TBili  1.2  /  DBili  x   /  AST  37[H]  /  ALT  34[H]  /  AlkPhos  347[H]            CAPILLARY BLOOD GLUCOSE        PT/INR - ( 2025 10:54 )   PT: 16.8 sec;   INR: 1.45 ratio         PTT - ( 2025 10:54 )  PTT:37.2 sec  Urinalysis Basic - ( 2025 03:00 )    Color: Yellow / Appearance: Clear / S.015 / pH: x  Gluc: x / Ketone: Negative mg/dL  / Bili: Negative / Urobili: 1.0 mg/dL   Blood: x / Protein: Trace mg/dL / Nitrite: Negative   Leuk Esterase: Small / RBC: 5 /HPF / WBC 6 /HPF   Sq Epi: x / Non Sq Epi: 2 /HPF / Bacteria: Occasional /HPF      CULTURES:      Physical Examination:  General: Patient with notable accessory muscle use.  Alert, oriented, interactive  HEENT: Pupils equal, reactive to light.  Symmetric.  PULM: Crackles to auscultation bilaterally, Wheezing heard in left upper lobe, increased work of breathing with accessory muscle use  CVS: Tachycardic, no murmurs, rubs, or gallops  ABD: Soft, nondistended, nontender, normoactive bowel sounds, no masses  EXT: Trace edema, nontender, facial swelling  SKIN: Warm and well perfused, no rashes noted.    RADIOLOGY:  < from: CT Angio Chest PE Protocol w/ IV Cont (25 @ 11:28) >  ACC: [id number] EXAM:  CT ANGIO CHEST PULM ART [facility]   ORDERED BY: ENEDELIA CAMP     PROCEDURE DATE:  2025          INTERPRETATION:  CLINICAL INFORMATION: Shortness of breast. Metastatic   breast cancer    COMPARISON: CT chest 2024    CONTRAST/COMPLICATIONS:  IV Contrast: Omnipaque 350  43 cc administered   57 cc discarded  Oral Contrast: NONE  .    PROCEDURE:  CT Angiography of the Chest.  Sagittal and coronal reformats were performed as well as 3D (MIP)   reconstructions.    FINDINGS:    LUNGS AND LARGE AIRWAYS: Airway wall thickening.  Background pulmonary interstitial and likely alveolar edema.  Superimposed widespread bilateral patchy airspace opacities,   significantly increased since the prior exam, with some opacities nodular   in appearance  PLEURA: Small left pleural effusion which is loculated towards the apex.   No pneumothorax  VESSELS: No acute pulmonary embolism  HEART: Mild cardiomegaly No pericardial effusion.  MEDIASTINUM AND MARINA: No lymphadenopathy.  CHEST WALL AND LOWER NECK: Right chest Mediport catheter terminates   within the right atrium in close proximity to the tricuspid valve. Right   breast skin thickening, likely treatment related change.  VISUALIZED UPPER ABDOMEN: No significant acute abnormality.  BONES: Unchanged bone metastases. T12 and L1 vertebroplasty    IMPRESSION:  No acute pulmonary embolism.    Pulmonary interstitial and likely alveolar edema with a small left   pleural effusion which is loculated towards the apex.    Widespreadpatchy airspace opacities bilaterally, increased since the   prior exam. Differential considerations include pneumonia versus   pulmonary edema with metastatic disease and treatment related change not   excluded        --- End of Report ---      < end of copied text >      CRITICAL CARE TIME SPENT: ***

## 2025-01-06 NOTE — CONSULT NOTE ADULT - ASSESSMENT
39y  Female with h/o metastatic breast cancer ER/[place] Neg, HER-2 + on chemotherapy (last dose 12/26/24) (mets to lung, liver, spleen, spine, bone and brain), gastritis w/ intermittent episodes of dark stool (follows w/ Dr. Rosado GI and is on PPI and octreotide daily). Patient presented 1/5 with c/o worsening SOB.  She was seen in ED 1/2/25 for for weakness and SOB at which time she was found to have Hb of 5 requiring PRBC transfusion and positive for Flu A and started on tamiflu and was discharged from the ED on 1/3/25.  Her respiratory status has worsened since then w/ productive cough associated with body aches and chills.  Denies sick contacts but has had many frequent hospital visits. Patient has been afebrile, no leukocytosis. Was placed on BIPAP for Worsening respiratory status. continued on Tamiflu. Vancomycn and Zosyn was added. Blood cultures with MSSA. ID input requested.       MSSA bacteremia   Influenza A   metastatic breast cancer ER/[place] Neg, HER-2 + on chemotherapy   Port in place     - Blood cultures 1/5 reporting MSSA   - Repeat blood cultures ordered   - Sputum Cx 1/6 pending   - RVP/COVID 19 PCR + Influenza A  - CTA Chest reporting No acute pulmonary embolism. Wide spread patchy airspace opacities bilaterally, increased since the prior exam.  - US RUQ reporting No evidence of acute cholecystitis or biliary ductal dilatation.  - Procalcitonin level ordered  - Continue oseltamivir 75mg [id number]   - Start Nafcillin 2gm IV [id number]  - D/C Vancomycin   - D/C Zosyn   - Hold off on PICC/Midline for now unless needed for reasons other than infectious diseases  - Follow up cultures  - Trend Fever  - Trend WBC      Thank you for allowing me to participate in the care of your patient.   Will Follow    Discussed treatment plan with: Dr Bryant and Clinical pharmacy

## 2025-01-06 NOTE — CONSULT NOTE ADULT - NS PANP COMMENT GEN_ALL_CORE FT
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 Pt reports dyspnea has been progressing over last 3 days with cough productive of sputum. She endorses chills. Endorses chest pain with coughing and tightness and endorses wheezing.  CT chest angio demonstrated no PE but did show patchy opacities bilaterally, interstitial edema, small loculated L effusion. Pt started on empiric abx and given bronchospasm started on steroids. Overnight, pt was noted with increased work of breathing and was started on BiPAP 12/6 40%. ABG prior to BiPAP 7.42/31/164 on 3L. Pt also received dose of diuretic/nebs/dilaudid. Pt re-evaluated in 3 twr after initiation of BiPAP and appeared much more comfortable, taking adequate tidal volumes and sating well on minimal O2 settings. Given persistent wheeze, pt ordered for continuous albuterol. c/w empiric broad spectrum pending cx and complete course of tamiflu. c/w solumedrol 40 q8h. wean supplemental O2 as tolerated for goal sat >90-92%. Can attempt to transition to high flow in AM If tolerating.  As she is currently improving with current therapy, no need for ICU level at this time. please call back in any questions/concerns or change in status. Pulmonary to consult on patient.

## 2025-01-06 NOTE — CHART NOTE - NSCHARTNOTEFT_GEN_A_CORE
PA NOTE-MEDICINE    Called by RN due to Pt with increased Rhonchi and + WOB.    40 y/o F w Admitted for Sepsis 2/2 Flu A w/ Superimposed Multifocal PNA.  PMH of metastatic breast cancer ER/[place] Neg, HER-2 pos on chemotherapy (mets to lung, liver, spleen, spine, bone and brain), ?GAVE related bleeding (follows w/ Dr. Rosado GI and is on PPI and octreotide daily) presented for worsening sob over the past few days.  Pt was seen in ED 1/2/25 for for weakness and sob at which time she was found to have Hb of 5 requiring prbc transfusion and positive for Flu A and started on tamiflu.  Pts respirostyr status has worsened since then w/ productive cough but is unable to desribe sputum.  Pt tachycardic, tachypneic w/ O2 at 90% ORA and placed on 2L NC w/ improved saturation.  Clinically toxic appearing speaking 2-3 word sentences w/ course scattered ronchi on exam and expiratory insp/exp scattered wheezing.   Initial w/u significant for leukopenia, thrombocytopenia, RVP +FlA, CTA chest negative for PE but found to have multifocal pna.   Recievied vanco/zosyn and 1550cc IVNS bolus in ED.  [facility] consulted and will admit for sepsis 2/2 multifocal PNA      Sepsis 2/2 Flu A w/ superimposed multifocal PNA  Acute hypoxic respiratory failure 2/2 Flu A w/ superimposed multifocal PNA   - Leukopenia w/ L-shift, tachycardic, tachypneic   - Minimal bandemia of 0.9% and normal lactate   - No neutropenia (ANC 2672)  - UA pending and Bcxs collected in ED  - RVP still positive for Flu AH3 and has been on tamiflu since 1/3 therefore will resume to complete 5 day course   - CTA negative for PE but w/ pulmonary/alveolar edema and small loculated L-effusion, wide spread patchy b/l air space disease increased from priar image   - Will also order sputum culture, mycoplasma pcr, strep UAg and legionella UAg   - PJP considered given pt is immunocompromised however clinically does not fit typical PJP presentation but if suspicion increases would w/u and consult ID   - Given pt bronchospastic w/ flu A infection w/ superimposed multifocal pna will start on IV steroid regimen and taper as tolerated   - s/p vanco/zosyn in ED and will continue empirically pending infectious w/u   - Hold off on further IVFs given pulm edema and small L effusion noted on CT chest and if will give 1x dose of IV lasix   - Monitor CBC w/ diff and temperature   - Low threshold to upgrade to SDU or get micu consult if decompensates       Chronic normocytic anemia 2/2 metastatic breast cancer on chemo and 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   Thrombocytopenia likely 2/2 sepsis   Breast cancer ER/[place] Neg, HER-2 pos on chemotherapy w/ mets to lung, liver, spleen, spine, bone and brain  - Baseline Hb ranges 8-9    - s/p prbc transfusion on 1/2 for Hb5 w/ improvement to 8 and today remains unchanged    - c/w octreotide and protonix for possible gastric antral vascular ectasia related bleeding   - Plts likely low from sepsis and no intervention required at this time  - c/w pregabalin, methylphenidate (confirmed on ISTOP Reference #: [id number])  - Reports being on methadone for pain but last 30 day script on istop dispensed 09/19/24  - Is on po dilaudid 4mg prn w/ last script dispensed 12/04/24   - Will c/w IV dilaudid for analgesia and if needed consult pain management    - Monitor CBC and transfuse for Hb<7 and plts<20K in setting of sepsis   - [facility] consulted       Hypokalemia   - Will supplement   - Maintain K>4  - Check magnesium level   - Monitor on telemetry       Transaminitis w/ cholestatic predominance   - ALP likely from mets to bone but follow GGT to confirm PA NOTE-MEDICINE    Called by RN due to Pt with increased Rhonchi and + WOB.    38 y/o F w Admitted for Sepsis 2/2 Flu A w/ Superimposed Multifocal PNA.  PMHX:  metastatic breast cancer ER/[place] Neg, HER-2 pos on chemotherapy (mets to lung, liver, spleen, spine, bone and brain), ?GAVE related bleeding (follows w/ Dr. Rosado GI and is on PPI and octreotide daily) presented for worsening sob over the past few days.  Pt was seen in ED 1/2/25 for for weakness and sob at which time she was found to have Hb of 5 requiring prbc transfusion and positive for Flu A and started on tamiflu.  Pts respiratory status has worsened since then w/ productive cough but is unable to describe sputum.  Pt tachycardic, tachypneic w/ O2 at 90% RA and placed on 2L NC w/ improved saturation.  Clinically toxic appearing speaking 2-3 word sentences w/ course scattered ronchi on exam and expiratory insp/exp scattered wheezing.   Initial w/u significant for leukopenia, thrombocytopenia, RVP +Flu A, CTA chest negative for PE but found to have multifocal pna.   Received vanco/zosyn and 1550cc IVNS bolus in ED.  [facility] consulted and will admit for sepsis 2/2 multifocal PNA/Flu A  BL CX Collected, sputum culture, mycoplasma pcr, strep UAg and legionella UAg  ordered  UA Pending     On tamiflu since 1/3 therefore will resume to complete 5 day course     Sepsis 2/2 Flu A w/ superimposed multifocal PNA  Acute hypoxic respiratory failure 2/2 Flu A w/ superimposed multifocal PNA   - Leukopenia w/ L-shift, tachycardic, tachypneic   - Minimal bandemia of 0.9% and normal lactate   - No neutropenia (ANC 2672)  - UA pending and Bcxs collected in ED  - RVP still positive for Flu AH3 and has been on tamiflu since 1/3 therefore will resume to complete 5 day course   - CTA negative for PE but w/ pulmonary/alveolar edema and small loculated L-effusion, wide spread patchy b/l air space disease increased from priar image   - Will also order sputum culture, mycoplasma pcr, strep UAg and legionella UAg   - PJP considered given pt is immunocompromised however clinically does not fit typical PJP presentation but if suspicion increases would w/u and consult ID   - Given pt bronchospastic w/ flu A infection w/ superimposed multifocal pna will start on IV steroid regimen and taper as tolerated   - s/p vanco/zosyn in ED and will continue empirically pending infectious w/u   - Hold off on further IVFs given pulm edema and small L effusion noted on CT chest and if will give 1x dose of IV lasix   - Monitor CBC w/ diff and temperature   - Low threshold to upgrade to SDU or get micu consult if decompensates       Chronic normocytic anemia 2/2 metastatic breast cancer on chemo and 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   Thrombocytopenia likely 2/2 sepsis   Breast cancer ER/[place] Neg, HER-2 pos on chemotherapy w/ mets to lung, liver, spleen, spine, bone and brain  - Baseline Hb ranges 8-9    - s/p prbc transfusion on 1/2 for Hb5 w/ improvement to 8 and today remains unchanged    - c/w octreotide and protonix for possible gastric antral vascular ectasia related bleeding   - Plts likely low from sepsis and no intervention required at this time  - c/w pregabalin, methylphenidate (confirmed on ISTOP Reference #: [id number])  - Reports being on methadone for pain but last 30 day script on istop dispensed 09/19/24  - Is on po dilaudid 4mg prn w/ last script dispensed 12/04/24   - Will c/w IV dilaudid for analgesia and if needed consult pain management    - Monitor CBC and transfuse for Hb<7 and plts<20K in setting of sepsis   - [facility] consulted       Hypokalemia   - Will supplement   - Maintain K>4  - Check magnesium level   - Monitor on telemetry       Transaminitis w/ cholestatic predominance   - ALP likely from mets to bone but follow GGT to confirm PA NOTE-MEDICINE    Called by RN due to Pt with increased Rhonchi and + WOB.    38 y/o F w Admitted for Sepsis 2/2 Flu A w/ Superimposed Multifocal PNA.  PMHX:  metastatic breast cancer ER/[place] Neg, HER-2 pos on chemotherapy (mets to lung, liver, spleen, spine, bone and brain), ?GAVE related bleeding (follows w/ Dr. Rosado GI and is on PPI and octreotide daily) presented for worsening sob over the past few days.  Pt was seen in ED 1/2/25 for for weakness and sob at which time she was found to have Hb of 5 requiring prbc transfusion and positive for Flu A and started on tamiflu.  Pts respiratory status has worsened since then w/ productive cough but is unable to describe sputum.  Pt tachycardic, tachypneic w/ O2 at 90% RA and placed on 2L NC w/ improved saturation.  Clinically toxic appearing speaking 2-3 word sentences w/ course scattered ronchi on exam and expiratory insp/exp scattered wheezing.   Initial w/u significant for leukopenia, thrombocytopenia, RVP +Flu A, CTA chest negative for PE but found to have multifocal pna.   Received vanco/zosyn and 1550cc IVNS bolus in ED.  [facility] consulted and will admit for sepsis 2/2 multifocal PNA/Flu A  BL CX Collected, sputum culture, mycoplasma pcr, strep UAg and legionella UAg  ordered  UA Pending     On tamiflu since 1/3 therefore resumed to complete 5 day course.  Solumederol 40 mg IVP q 8 initiated.           Chronic normocytic anemia 2/2 metastatic breast cancer on chemo and 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   Thrombocytopenia likely 2/2 sepsis   Breast cancer ER/OK Neg, HER-2 pos on chemotherapy w/ mets to lung, liver, spleen, spine, bone and brain  - Baseline Hb ranges 8-9      - c/w octreotide and protonix for possible gastric antral vascular ectasia related bleeding   - Plts likely low from sepsis and no intervention required at this time  - c/w pregabalin, methylphenidate (confirmed on ISTOP Reference #: [id number])  - Reports being on methadone for pain but last 30 day script on istop dispensed 09/19/24  - Is on po dilaudid 4mg prn w/ last script dispensed 12/04/24   - Will c/w IV dilaudid for analgesia and if needed consult pain management    - Monitor CBC and transfuse for Hb<7 and plts<20K in setting of sepsis   - [facility] consulted       Hypokalemia   - Will supplement   - Maintain K>4  - Check magnesium level   - Monitor on telemetry       Transaminitis w/ cholestatic predominance   - ALP likely from mets to bone but follow GGT to confirm PA NOTE-MEDICINE    Called by RN due to Pt with increased Rhonchi and + WOB.      38 y/o F w Admitted for Sepsis 2/2 Flu A w/ Superimposed Multifocal PNA.  PMHX:  metastatic breast cancer ER/[place] Neg, HER-2 pos on chemotherapy (mets to lung, liver, spleen, spine, bone and brain), ?GAVE related bleeding with Chronic Normocytic Anemia Baseline Hb ranges 8-9  (follows w/ Dr. Rosado GI and is on PPI and octreotide daily), presented for worsening sob over the past few days.  Pt was seen in ED 1/2/25 for for weakness and sob at which time she was found to have Hb of 5 requiring prbc transfusion and positive for Flu A and started on tamiflu.  Pts respiratory status has worsened since then w/ productive cough but is unable to describe sputum.  Pt tachycardic, tachypneic w/ O2 at 90% RA and placed on 2L NC w/ improved saturation.  On Admission-Clinically toxic appearing , Speaking 2-3 word sentences w/ course scattered ronchi on exam and expiratory insp/exp scattered wheezing.  Initial w/u significant for leukopenia, thrombocytopenia, RVP +Flu A, CTA chest negative for PE but found to have multifocal pna.   Received vanco/zosyn Stat then Daily along with 1550cc IVNS bolus in ED.  [facility] consulted.   BL CX Collected, sputum culture, mycoplasma pcr, strep UAg and legionella UAg ordered. On tamiflu since 1/3 therefore resumed to complete 5 day course.  Solumederol 40 mg IVP q 8 initiated.  Octreotide and protonix continued for possible gastric antral vascular ectasia related bleeding.   Pt takes po dilaudid 4mg prn at Home.     T(C): 36.6 (06 Jan 2025 03:15), Max: 37 (05 Jan 2025 10:33)  T(F): 97.9 (06 Jan 2025 03:15), Max: 98.6 (05 Jan 2025 10:33)  HR: 88 (06 Jan 2025 03:15) (76 - 104)  BP: 104/69 (06 Jan 2025 03:15) (104/69 - 119/51)  BP(mean): 83 (06 Jan 2025 00:00) (83 - 83)  RR: 22 (06 Jan 2025 03:15) (16 - 22)  SpO2: 95% (06 Jan 2025 03:15) (95% - 98%) 3 L nc     General: WDWN F sitting up in Bed + WOB w/Accessory Muscle Use  Able to only speak at short clips with only a few words     Cardiac: S1S2 + Tachy Reg Rhythm  Lungs: + Audible Rhonchi without stethoscope + Wheezing B/L A-B  Abd: NDNT Soft No Rigidity + BS x 4 Q  Integument: No Pallor + Sl Diaphoresis   Ext: DENNEY x 4 No C/C/E x 4     A/P Eval Pt due to Increase WOB with + Multifocal PNA Flu A and Lung Ca Mets from Primary Br Ca  CXR Stat  ABG Stat  Lasix 20 Mg IVP x 1 Stat  Dilaudid 1 Mg IVP x 1 Stat  Additional Solumederol 40 Mg IVP x 1 Stat   Xopenex w/Mucomyst NEB Tx x 1 Stat then Q 6 ATC   Albumin 25%/50 cc x 2 Stat     Called MICU due to Pt requiring Bipap  MICU will see Pt   Ordered BIpap-Continuous 12/6/14 @ 40 %   Upgraded Pt to Stepdown with Q 2 Vitals     Continue to monitor Pt  Recall PA for any changes in Pt status or if No Improvement in Pt Respiratory Status   Will sign out to AM Team to Follow PA NOTE-MEDICINE    Called by RN due to Pt with increased Rhonchi and + WOB.      38 y/o F w Admitted for Sepsis 2/2 Flu A w/ Superimposed Multifocal PNA.  PMHX:  metastatic breast cancer ER/[place] Neg, HER-2 pos on chemotherapy (mets to lung, liver, spleen, spine, bone and brain), ?GAVE related bleeding with Chronic Normocytic Anemia Baseline Hb ranges 8-9  (follows w/ Dr. Rosado GI and is on PPI and octreotide daily), presented for worsening sob over the past few days.  Pt was seen in ED 1/2/25 for for weakness and sob at which time she was found to have Hb of 5 requiring prbc transfusion and positive for Flu A and started on tamiflu.  Pts respiratory status has worsened since then w/ productive cough but is unable to describe sputum. In ED Pt tachycardic, tachypneic w/ O2 at 90% RA and placed on 2L NC w/ improved saturation.  On Admission-Clinically toxic appearing , Speaking 2-3 word sentences w/ course scattered ronchi on exam and expiratory insp/exp scattered wheezing.  Initial w/u significant for leukopenia, thrombocytopenia, RVP +Flu A, CTA chest negative for PE but found to have multifocal pna.   Received vanco/zosyn Stat then Daily along with 1550cc IVNS bolus in ED.  [facility] consulted.   BL CX Collected, sputum culture, mycoplasma pcr, strep UAg and legionella UAg ordered. On tamiflu since 1/3 therefore resumed to complete 5 day course.  Solumederol 40 mg IVP q 8 initiated.  Octreotide and protonix continued for possible gastric antral vascular ectasia related bleeding.   Pt takes po dilaudid 4mg prn at Home.     T(C): 36.6 (06 Jan 2025 03:15), Max: 37 (05 Jan 2025 10:33)  T(F): 97.9 (06 Jan 2025 03:15), Max: 98.6 (05 Jan 2025 10:33)  HR: 88 (06 Jan 2025 03:15) (76 - 104)  BP: 104/69 (06 Jan 2025 03:15) (104/69 - 119/51)  BP(mean): 83 (06 Jan 2025 00:00) (83 - 83)  RR: 22 (06 Jan 2025 03:15) (16 - 22)  SpO2: 95% (06 Jan 2025 03:15) (95% - 98%) 3 L nc     General: WDWN F sitting up in Bed + WOB w/Accessory Muscle Use  Able to only speak at short clips with only a few words     Cardiac: S1S2 + Tachy Reg Rhythm  Lungs: + Audible Rhonchi without stethoscope + Wheezing B/L A-B  Abd: NDNT Soft No Rigidity + BS x 4 Q  Integument: No Pallor + Sl Diaphoresis   Ext: DENNEY x 4 No C/C/E x 4     A/P Eval Pt due to Increase WOB with + Multifocal PNA Flu A and Lung Ca Mets from Primary Br Ca  CXR Stat  ABG Stat  Lasix 20 Mg IVP x 1 Stat  Dilaudid 1 Mg IVP x 1 Stat  Additional Solumederol 40 Mg IVP x 1 Stat   Xopenex w/Mucomyst NEB Tx x 1 Stat then Q 6 ATC   Albumin 25%/50 cc x 2 Stat     Called MICU due to Pt requiring Bipap  MICU will see Pt   Ordered BIpap-Continuous 12/6/14 @ 40 %   Upgraded Pt to Stepdown with Q 2 Vitals     Continue to monitor Pt  Recall PA for any changes in Pt status or if No Improvement in Pt Respiratory Status   Will sign out to AM Team to Follow PA NOTE-MEDICINE    Called by RN due to Pt with increased Rhonchi and + WOB.      40 y/o F w Admitted for Sepsis 2/2 Flu A w/ Superimposed Multifocal PNA.  PMHX:  metastatic breast cancer ER/[place] Neg, HER-2 pos on chemotherapy (mets to lung, liver, spleen, spine, bone and brain), ?GAVE related bleeding with Chronic Normocytic Anemia Baseline Hb ranges 8-9  (follows w/ Dr. Rosado GI and is on PPI and octreotide daily), presented for worsening sob over the past few days.  Pt was seen in ED 1/2/25 for for weakness and sob at which time she was found to have Hb of 5 requiring prbc transfusion and positive for Flu A and started on tamiflu.  Pts respiratory status has worsened since then w/ productive cough but is unable to describe sputum. In ED Pt tachycardic, tachypneic w/ O2 at 90% RA and placed on 2L NC w/ improved saturation.  On Admission-Clinically toxic appearing , Speaking 2-3 word sentences w/ course scattered ronchi on exam and expiratory insp/exp scattered wheezing.  Initial w/u significant for leukopenia, thrombocytopenia, RVP +Flu A, CTA chest negative for PE but found to have multifocal pna.   Received vanco/zosyn Stat then Daily along with 1550cc IVNS bolus in ED.  [facility] consulted.   BL CX Collected, sputum culture, mycoplasma pcr, strep UAg and legionella UAg ordered. On tamiflu since 1/3 therefore resumed to complete 5 day course.  Solumederol 40 mg IVP q 8 initiated.  Octreotide and protonix continued for possible gastric antral vascular ectasia related bleeding.   Pt takes po dilaudid 4mg prn at Home.     T(C): 36.6 (06 Jan 2025 03:15), Max: 37 (05 Jan 2025 10:33)  T(F): 97.9 (06 Jan 2025 03:15), Max: 98.6 (05 Jan 2025 10:33)  HR: 88 (06 Jan 2025 03:15) (76 - 104)  BP: 104/69 (06 Jan 2025 03:15) (104/69 - 119/51)  BP(mean): 83 (06 Jan 2025 00:00) (83 - 83)  RR: 22 (06 Jan 2025 03:15) (16 - 22)  SpO2: 95% (06 Jan 2025 03:15) (95% - 98%) 3 L nc     General: WDWN F sitting up in Bed + WOB w/Accessory Muscle Use  Pursed lips-Able to only speak at short clips with only a few words     Cardiac: S1S2 + Tachy Reg Rhythm  Lungs: + Audible Rhonchi without stethoscope + Wheezing B/L A-B  Abd: NDNT Soft No Rigidity + BS x 4 Q  Integument: No Pallor + Sl Diaphoresis   Ext: DENNEY x 4 No C/C/E x 4     A/P Eval Pt due to Increase WOB with + Multifocal PNA Flu A and Lung Ca Mets from Primary Br Ca  CXR Stat  ABG Stat  Lasix 20 Mg IVP x 1 Stat  Dilaudid 1 Mg IVP x 1 Stat  Additional Solumederol 40 Mg IVP x 1 Stat   Xopenex w/Mucomyst NEB Tx x 1 Stat then Q 6 ATC   Albumin 25%/50 cc x 2 Stat     ABG - ( 06 Jan 2025 02:28 )  pH, Arterial: 7.420 pH, Blood: x     /  pCO2: 31    /  pO2: 164   / HCO3: 20    / Base Excess: -4.4  /  SaO2: 98.9      Called MICU due to Pt requiring Bipap due to + WOB   MICU will see Pt   Ordered BIpap-Continuous 12/6/14 @ 40 %   Upgraded Pt to Stepdown with Q 2 Vitals     Continue to monitor Pt  Recall PA for any changes in Pt status or if No Improvement in Pt Respiratory Status   Will sign out to AM Team to Follow

## 2025-01-06 NOTE — CONSULT NOTE ADULT - ASSESSMENT
38 y/o F with metastatic breast cancer, HER2- on tucatinib, trastuzumab    Metastatic breast cancer - last dose of trastuzumab was on 12/26/24.  All treatment on hold at the moment.  Follow up with primary oncologist, Dr. Chapa.    Cytopenias - secondary to malignancy. Platelet count is lower than baseline at 66k.   Drop also due to acute illness.  Hgb 7.1. No bleeding reported.  Transfuse if hgb<7.0.    Respiratory failure - multifocal pneumonia.  On nafcillin, on BIPAP.  On oseltamivir.  Management as per primary team.      Thank you    Pan Rivers MD

## 2025-01-06 NOTE — PROGRESS NOTE ADULT - SUBJECTIVE AND OBJECTIVE BOX
[facility] Division of Medicine    SUBJECTIVE / OVERNIGHT EVENTS: Pt with increased work of breathing overnight. MICU consulted. Pt placed on bipap with improvement in RR. Pt seen at the bedside today, still on bipap but tolerating. Endorses chest pressure/tightness but otherwise denies any new complaints. All other systems reviewed and are negative.    MEDICATIONS  (STANDING):  acetylcysteine 10%  Inhalation 4 milliLiter(s) Inhalation every 6 hours  albuterol Continuous Nebulization (Vibrating Mesh Nebulizer) 10 mG/Hr (4 mL/Hr) Continuous Inhalation. <Continuous>  chlorhexidine 2% Cloths 1 Application(s) Topical daily  levalbuterol Inhalation 1.25 milliGRAM(s) Inhalation every 6 hours  methylPREDNISolone sodium succinate Injectable 40 milliGRAM(s) IV Push every 8 hours  nafcillin  IVPB 2 Gram(s) IV Intermittent every 4 hours  octreotide  Injectable 100 MICROGram(s) SubCutaneous two times a day  oseltamivir 75 milliGRAM(s) Oral two times a day  oseltamivir 75 milliGRAM(s) Oral two times a day  pantoprazole  Injectable 40 milliGRAM(s) IV Push every 12 hours  pregabalin 200 milliGRAM(s) Oral every 8 hours    MEDICATIONS  (PRN):  acetaminophen     Tablet .. 650 milliGRAM(s) Oral every 6 hours PRN Temp greater or equal to 38C (100.4F), Mild Pain (1 - 3)  aluminum hydroxide/magnesium hydroxide/simethicone Suspension 30 milliLiter(s) Oral every 4 hours PRN Dyspepsia  benzonatate 100 milliGRAM(s) Oral every 8 hours PRN Cough  guaifenesin/dextromethorphan Oral Liquid 10 milliLiter(s) Oral every 4 hours PRN Cough  HYDROmorphone  Injectable 2 milliGRAM(s) IV Push every 3 hours PRN Severe Pain (7 - 10)  HYDROmorphone  Injectable 1 milliGRAM(s) IV Push every 3 hours PRN Moderate Pain (4 - 6)  HYDROmorphone  Injectable 0.2 milliGRAM(s) IV Push every 3 hours PRN break through pain  melatonin 3 milliGRAM(s) Oral at bedtime PRN Insomnia  ondansetron Injectable 4 milliGRAM(s) IV Push every 8 hours PRN Nausea and/or Vomiting      I&O's Summary    05 Jan 2025 07:01 - 06 Jan 2025 07:00  --------------------------------------------------------  IN: 450 mL / OUT: 800 mL / NET: -350 mL    06 Jan 2025 07:01  -  06 Jan 2025 15:44  --------------------------------------------------------  IN: 905 mL / OUT: 0 mL / NET: 905 mL        acetaminophen     Tablet .. 650 milliGRAM(s) Oral every 6 hours PRN  acetylcysteine 10%  Inhalation 4 milliLiter(s) Inhalation every 6 hours  albuterol Continuous Nebulization (Vibrating Mesh Nebulizer) 10 mG/Hr Continuous Inhalation. <Continuous>  aluminum hydroxide/magnesium hydroxide/simethicone Suspension 30 milliLiter(s) Oral every 4 hours PRN  benzonatate 100 milliGRAM(s) Oral every 8 hours PRN  chlorhexidine 2% Cloths 1 Application(s) Topical daily  guaifenesin/dextromethorphan Oral Liquid 10 milliLiter(s) Oral every 4 hours PRN  HYDROmorphone  Injectable 2 milliGRAM(s) IV Push every 3 hours PRN  HYDROmorphone  Injectable 1 milliGRAM(s) IV Push every 3 hours PRN  HYDROmorphone  Injectable 0.2 milliGRAM(s) IV Push every 3 hours PRN  levalbuterol Inhalation 1.25 milliGRAM(s) Inhalation every 6 hours  melatonin 3 milliGRAM(s) Oral at bedtime PRN  methylPREDNISolone sodium succinate Injectable 40 milliGRAM(s) IV Push every 8 hours  nafcillin  IVPB 2 Gram(s) IV Intermittent every 4 hours  octreotide  Injectable 100 MICROGram(s) SubCutaneous two times a day  ondansetron Injectable 4 milliGRAM(s) IV Push every 8 hours PRN  oseltamivir 75 milliGRAM(s) Oral two times a day  oseltamivir 75 milliGRAM(s) Oral two times a day  pantoprazole  Injectable 40 milliGRAM(s) IV Push every 12 hours  pregabalin 200 milliGRAM(s) Oral every 8 hours      PHYSICAL EXAM:  Vital Signs Last 24 Hrs  T(C): 36.7 (06 Jan 2025 13:15), Max: 36.9 (05 Jan 2025 16:07)  T(F): 98.1 (06 Jan 2025 13:15), Max: 98.5 (05 Jan 2025 16:07)  HR: 89 (06 Jan 2025 14:20) (77 - 109)  BP: 99/51 (06 Jan 2025 14:00) (97/62 - 119/51)  BP(mean): 87 (06 Jan 2025 14:00) (65 - 87)  RR: 17 (06 Jan 2025 14:00) (15 - 22)  SpO2: 100% (06 Jan 2025 14:20) (95% - 100%)    Parameters below as of 06 Jan 2025 14:20  Patient On (Oxygen Delivery Method): nasal cannula, high flow, 60%          CONSTITUTIONAL: no apparent distress  RESP: mild resp distress, diffuse rhonchi, no wheezes or rales  CV: RRR, +S1S2, no peripheral edema  GI: Soft, NT, ND  PSYCH: A+O x 3, mood and affect appropriate  NEURO: Cooperative, upper and lower motor function grossly intact bilaterally, sensation grossly intact throughout      LABS:                        7.1    2.96  )-----------( 66       ( 06 Jan 2025 07:36 )             22.7     01-06    138  |  104  |  10.3  ----------------------------<  203[H]  3.8   |  20.0[L]  |  0.49[L]    Ca    7.2[L]      06 Jan 2025 07:36  Mg     1.8     01-06    TPro  5.1[L]  /  Alb  2.4[L]  /  TBili  1.2  /  DBili  x   /  AST  37[H]  /  ALT  34[H]  /  AlkPhos  347[H]  01-05    PT/INR - ( 05 Jan 2025 10:54 )   PT: 16.8 sec;   INR: 1.45 ratio         PTT - ( 05 Jan 2025 10:54 )  PTT:37.2 sec      Urinalysis Basic - ( 06 Jan 2025 07:36 )    Color: x / Appearance: x / SG: x / pH: x  Gluc: 203 mg/dL / Ketone: x  / Bili: x / Urobili: x   Blood: x / Protein: x / Nitrite: x   Leuk Esterase: x / RBC: x / WBC x   Sq Epi: x / Non Sq Epi: x / Bacteria: x        Culture - Sputum (collected 06 Jan 2025 01:34)  Source: .Sputum Sputum  Gram Stain (06 Jan 2025 13:49):    Moderate Squamous epithelial cells seen per low power field    Moderate polymorphonuclear leukocytes seen per low power field    Few Gram Negative Rods seen per oil power field    Few Gram positive cocci in pairs seen per oil power field    Rare Gram Positive Rods seen per oil power field    Rare Yeast like cells seen per oil power field    Culture - Urine (collected 05 Jan 2025 12:34)  Source: Clean Catch Clean Catch (Midstream)  Final Report (06 Jan 2025 14:51):    <10,000 CFU/mL Normal Urogenital Stacie    Culture - Blood (collected 05 Jan 2025 10:54)  Source: .Blood BLOOD  Gram Stain (06 Jan 2025 08:58):    Growth in anaerobic bottle: Gram Positive Cocci in Clusters  Preliminary Report (06 Jan 2025 08:59):    Growth in anaerobic bottle: Gram Positive Cocci in Clusters    Direct identification is available within approximately 3-5    hours either by Blood Panel Multiplexed PCR or Direct    MALDI-TOF. Details: [email or web address]  Organism: Blood Culture PCR (06 Jan 2025 11:35)  Organism: Blood Culture PCR (06 Jan 2025 11:35)      CAPILLARY BLOOD GLUCOSE          IMAGING:

## 2025-01-06 NOTE — CONSULT NOTE ADULT - ASSESSMENT
This is a 40 y/o F w/ PMH of metastatic breast cancer ER/SD Neg, HER-2 pos on chemotherapy (last dose 12/26/24) (mets to lung, liver, spleen, spine, bone and brain), gastritis w/ intermittent episodes of dark stool (follows w/ Dr. Rosado GI and is on PPI and octreotide daily) presented for worsening sob over the past few days.  Pt was seen in ED 1/2/25 for for weakness and sob at which time she was found to have Hb of 5 requiring prbc transfusion and positive for Flu A and started on tamiflu.  Pts respiratory status has worsened since then w/ productive cough but is unable to describe sputum.  Pt reports body aches and chills.  Pt denies sick contacts but has had many frequent hospital visits.  She denies abd pain, N/V, diarrhea, dysuria, cp, palpitations. Now with continued worsening respiratory status 1/5 into 1/6. MICU was consulted for new BiPap secondary to increased work of breathing. On exam patient is with audible wheezing, unable to speak in full sentences, and with accessory muscle use. She reports in the past she has required BiPap for an allergic reaction and was able to tolerate the mask.    #PNA  #Flu +  #Acute respiratory distress    -CT reviewed with noted widespread patchy airspace opacities bilaterally PNA vs pulmonary edema vs pneumonitis(less likely)   -Was given lasix 20mg for possible volume overload, please send BNP, TTE from july 24 with normal EF, however patient has remained on active chemotherapy with xeloda, tucatinib, and trastuzanab LD (10/24) per outpatient chart review would repeat TTE to r/o cardiogenic component given possibility of cardiotoxicity, would f/u with heme/onc for further recs  -Recommend initiation of BiPap for increased work of breathing. Patient ABG on 3L NC without hypoxia, co2 31 likely secondary to tachypnea ph 7.42. Repeat ABG 1 hour after Bipap has been in place  -Will give continuous albuterol for upper airway wheezing/bronchospasm. Will reassess need after six hours.  -Added PPI BID given hx of GIB  -Continue methylprednisolone 40mg every 8 hours and abx coverage with vanc/zosyn, continue tamiflu course      Given patients tenuous respiratory exam, would recommend SDU. Please reconsult MICU should her respiratory status decline. During our exam we discussed if mechanical ventilation were necessary would the patient be agreeable. She verbalized she would want to be intubated and remains Full Code at this time. In addition stated that if she was unable to make decisions her sister (Arianne) would be her surrogate decision maker.

## 2025-01-07 LAB
-  AMOXICILLIN/CLAVULANIC ACID: SIGNIFICANT CHANGE UP
-  AMPICILLIN/SULBACTAM: SIGNIFICANT CHANGE UP
-  AMPICILLIN: SIGNIFICANT CHANGE UP
-  AZTREONAM: SIGNIFICANT CHANGE UP
-  CEFAZOLIN: SIGNIFICANT CHANGE UP
-  CEFEPIME: SIGNIFICANT CHANGE UP
-  CEFOXITIN: SIGNIFICANT CHANGE UP
-  CEFTRIAXONE: SIGNIFICANT CHANGE UP
-  CEFUROXIME: SIGNIFICANT CHANGE UP
-  CIPROFLOXACIN: SIGNIFICANT CHANGE UP
-  ERTAPENEM: SIGNIFICANT CHANGE UP
-  GENTAMICIN: SIGNIFICANT CHANGE UP
-  IMIPENEM: SIGNIFICANT CHANGE UP
-  LEVOFLOXACIN: SIGNIFICANT CHANGE UP
-  MEROPENEM: SIGNIFICANT CHANGE UP
-  NITROFURANTOIN: SIGNIFICANT CHANGE UP
-  PIPERACILLIN/TAZOBACTAM: SIGNIFICANT CHANGE UP
-  TOBRAMYCIN: SIGNIFICANT CHANGE UP
-  TRIMETHOPRIM/SULFAMETHOXAZOLE: SIGNIFICANT CHANGE UP
ALBUMIN SERPL ELPH-MCNC: 2.6 G/DL — LOW (ref 3.3–5.2)
ALP SERPL-CCNC: 228 U/L — HIGH (ref 40–120)
ALT FLD-CCNC: 22 U/L — SIGNIFICANT CHANGE UP
ANION GAP SERPL CALC-SCNC: 13 MMOL/L — SIGNIFICANT CHANGE UP (ref 5–17)
AST SERPL-CCNC: 16 U/L — SIGNIFICANT CHANGE UP
BILIRUB DIRECT SERPL-MCNC: 2.6 MG/DL — HIGH (ref 0–0.3)
BILIRUB INDIRECT FLD-MCNC: 0.5 MG/DL — SIGNIFICANT CHANGE UP (ref 0.2–1)
BILIRUB SERPL-MCNC: 3.1 MG/DL — HIGH (ref 0.4–2)
BUN SERPL-MCNC: 17.5 MG/DL — SIGNIFICANT CHANGE UP (ref 8–20)
CALCIUM SERPL-MCNC: 7.2 MG/DL — LOW (ref 8.4–10.5)
CHLORIDE SERPL-SCNC: 106 MMOL/L — SIGNIFICANT CHANGE UP (ref 96–108)
CO2 SERPL-SCNC: 20 MMOL/L — LOW (ref 22–29)
CREAT SERPL-MCNC: 0.61 MG/DL — SIGNIFICANT CHANGE UP (ref 0.5–1.3)
EGFR: 117 ML/MIN/1.73M2 — SIGNIFICANT CHANGE UP
FERRITIN SERPL-MCNC: 1731 NG/ML — HIGH (ref 15–150)
GAS PNL BLDA: SIGNIFICANT CHANGE UP
GLUCOSE BLDC GLUCOMTR-MCNC: 294 MG/DL — HIGH (ref 70–99)
GLUCOSE BLDC GLUCOMTR-MCNC: 389 MG/DL — HIGH (ref 70–99)
GLUCOSE BLDC GLUCOMTR-MCNC: 390 MG/DL — HIGH (ref 70–99)
GLUCOSE BLDC GLUCOMTR-MCNC: 412 MG/DL — HIGH (ref 70–99)
GLUCOSE SERPL-MCNC: 394 MG/DL — HIGH (ref 70–99)
GRAM STN FLD: ABNORMAL
HCT VFR BLD CALC: 22 % — LOW (ref 34.5–45)
HCT VFR BLD CALC: 22.1 % — LOW (ref 34.5–45)
HCT VFR BLD CALC: 23.4 % — LOW (ref 34.5–45)
HGB BLD-MCNC: 7.1 G/DL — LOW (ref 11.5–15.5)
HGB BLD-MCNC: 7.1 G/DL — LOW (ref 11.5–15.5)
HGB BLD-MCNC: 7.5 G/DL — LOW (ref 11.5–15.5)
IRON SATN MFR SERPL: 28 % — SIGNIFICANT CHANGE UP (ref 14–50)
IRON SATN MFR SERPL: 35 UG/DL — LOW (ref 37–145)
LACTATE BLDV-MCNC: 3.7 MMOL/L — HIGH (ref 0.5–2)
MAGNESIUM SERPL-MCNC: 2.3 MG/DL — SIGNIFICANT CHANGE UP (ref 1.6–2.6)
MCHC RBC-ENTMCNC: 29.5 PG — SIGNIFICANT CHANGE UP (ref 27–34)
MCHC RBC-ENTMCNC: 29.6 PG — SIGNIFICANT CHANGE UP (ref 27–34)
MCHC RBC-ENTMCNC: 29.8 PG — SIGNIFICANT CHANGE UP (ref 27–34)
MCHC RBC-ENTMCNC: 32.1 G/DL — SIGNIFICANT CHANGE UP (ref 32–36)
MCHC RBC-ENTMCNC: 32.1 G/DL — SIGNIFICANT CHANGE UP (ref 32–36)
MCHC RBC-ENTMCNC: 32.3 G/DL — SIGNIFICANT CHANGE UP (ref 32–36)
MCV RBC AUTO: 92.1 FL — SIGNIFICANT CHANGE UP (ref 80–100)
MCV RBC AUTO: 92.1 FL — SIGNIFICANT CHANGE UP (ref 80–100)
MCV RBC AUTO: 92.4 FL — SIGNIFICANT CHANGE UP (ref 80–100)
METHOD TYPE: SIGNIFICANT CHANGE UP
NRBC # BLD: 2 /100 WBCS — HIGH (ref 0–0)
NRBC # BLD: 2 /100 WBCS — HIGH (ref 0–0)
NRBC # BLD: 3 /100 WBCS — HIGH (ref 0–0)
NRBC BLD-RTO: 2 /100 WBCS — HIGH (ref 0–0)
NRBC BLD-RTO: 2 /100 WBCS — HIGH (ref 0–0)
NRBC BLD-RTO: 3 /100 WBCS — HIGH (ref 0–0)
ORGANISM # SPEC MICROSCOPIC CNT: ABNORMAL
ORGANISM # SPEC MICROSCOPIC CNT: SIGNIFICANT CHANGE UP
PLATELET # BLD AUTO: 70 K/UL — LOW (ref 150–400)
PLATELET # BLD AUTO: 73 K/UL — LOW (ref 150–400)
PLATELET # BLD AUTO: 88 K/UL — LOW (ref 150–400)
POTASSIUM SERPL-MCNC: 3.3 MMOL/L — LOW (ref 3.5–5.3)
POTASSIUM SERPL-SCNC: 3.3 MMOL/L — LOW (ref 3.5–5.3)
PROCALCITONIN SERPL-MCNC: 2.32 NG/ML — HIGH (ref 0.02–0.1)
PROT SERPL-MCNC: 5.5 G/DL — LOW (ref 6.6–8.7)
RBC # BLD: 2.38 M/UL — LOW (ref 3.8–5.2)
RBC # BLD: 2.4 M/UL — LOW (ref 3.8–5.2)
RBC # BLD: 2.54 M/UL — LOW (ref 3.8–5.2)
RBC # FLD: 24.6 % — HIGH (ref 10.3–14.5)
RBC # FLD: 24.8 % — HIGH (ref 10.3–14.5)
RBC # FLD: 25.1 % — HIGH (ref 10.3–14.5)
SODIUM SERPL-SCNC: 139 MMOL/L — SIGNIFICANT CHANGE UP (ref 135–145)
TIBC SERPL-MCNC: 124 UG/DL — LOW (ref 220–430)
TRANSFERRIN SERPL-MCNC: 87 MG/DL — LOW (ref 192–382)
WBC # BLD: 6.03 K/UL — SIGNIFICANT CHANGE UP (ref 3.8–10.5)
WBC # BLD: 6.51 K/UL — SIGNIFICANT CHANGE UP (ref 3.8–10.5)
WBC # BLD: 7.43 K/UL — SIGNIFICANT CHANGE UP (ref 3.8–10.5)
WBC # FLD AUTO: 6.03 K/UL — SIGNIFICANT CHANGE UP (ref 3.8–10.5)
WBC # FLD AUTO: 6.51 K/UL — SIGNIFICANT CHANGE UP (ref 3.8–10.5)
WBC # FLD AUTO: 7.43 K/UL — SIGNIFICANT CHANGE UP (ref 3.8–10.5)

## 2025-01-07 PROCEDURE — G0545: CPT

## 2025-01-07 PROCEDURE — 99222 1ST HOSP IP/OBS MODERATE 55: CPT

## 2025-01-07 PROCEDURE — 93010 ELECTROCARDIOGRAM REPORT: CPT

## 2025-01-07 PROCEDURE — 99233 SBSQ HOSP IP/OBS HIGH 50: CPT

## 2025-01-07 RX ORDER — DM/PSEUDOEPHED/ACETAMINOPHEN 10-30-250
25 CAPSULE ORAL ONCE
Refills: 0 | Status: DISCONTINUED | OUTPATIENT
Start: 2025-01-07 | End: 2025-01-31

## 2025-01-07 RX ORDER — INSULIN LISPRO 100/ML
VIAL (ML) SUBCUTANEOUS
Refills: 0 | Status: DISCONTINUED | OUTPATIENT
Start: 2025-01-07 | End: 2025-01-31

## 2025-01-07 RX ORDER — HYDROMORPHONE HYDROCHLORIDE 4 MG/ML
30 INJECTION, SOLUTION INTRAMUSCULAR; INTRAVENOUS; SUBCUTANEOUS
Refills: 0 | Status: DISCONTINUED | OUTPATIENT
Start: 2025-01-07 | End: 2025-01-09

## 2025-01-07 RX ORDER — METHADONE HYDROCHLORIDE 5 MG/5ML
5 SOLUTION ORAL ONCE
Refills: 0 | Status: DISCONTINUED | OUTPATIENT
Start: 2025-01-07 | End: 2025-01-07

## 2025-01-07 RX ORDER — DM/PSEUDOEPHED/ACETAMINOPHEN 10-30-250
12.5 CAPSULE ORAL ONCE
Refills: 0 | Status: DISCONTINUED | OUTPATIENT
Start: 2025-01-07 | End: 2025-01-31

## 2025-01-07 RX ORDER — GLUCAGON 3 MG/1
1 POWDER NASAL ONCE
Refills: 0 | Status: DISCONTINUED | OUTPATIENT
Start: 2025-01-07 | End: 2025-01-31

## 2025-01-07 RX ORDER — SODIUM CHLORIDE 9 G/ML
1000 INJECTION, SOLUTION INTRAVENOUS
Refills: 0 | Status: DISCONTINUED | OUTPATIENT
Start: 2025-01-07 | End: 2025-01-31

## 2025-01-07 RX ORDER — METHADONE HYDROCHLORIDE 5 MG/5ML
5 SOLUTION ORAL
Refills: 0 | Status: DISCONTINUED | OUTPATIENT
Start: 2025-01-08 | End: 2025-01-15

## 2025-01-07 RX ORDER — HYDROMORPHONE HYDROCHLORIDE 4 MG/ML
0.5 INJECTION, SOLUTION INTRAMUSCULAR; INTRAVENOUS; SUBCUTANEOUS ONCE
Refills: 0 | Status: DISCONTINUED | OUTPATIENT
Start: 2025-01-07 | End: 2025-01-07

## 2025-01-07 RX ORDER — METHADONE HYDROCHLORIDE 5 MG/5ML
10 SOLUTION ORAL AT BEDTIME
Refills: 0 | Status: DISCONTINUED | OUTPATIENT
Start: 2025-01-07 | End: 2025-01-14

## 2025-01-07 RX ORDER — BACTERIOSTATIC SODIUM CHLORIDE 0.9 %
500 VIAL (ML) INJECTION ONCE
Refills: 0 | Status: COMPLETED | OUTPATIENT
Start: 2025-01-07 | End: 2025-01-07

## 2025-01-07 RX ORDER — INSULIN LISPRO 100/ML
4 VIAL (ML) SUBCUTANEOUS ONCE
Refills: 0 | Status: COMPLETED | OUTPATIENT
Start: 2025-01-07 | End: 2025-01-07

## 2025-01-07 RX ORDER — METHADONE HYDROCHLORIDE 5 MG/5ML
10 SOLUTION ORAL DAILY
Refills: 0 | Status: DISCONTINUED | OUTPATIENT
Start: 2025-01-07 | End: 2025-01-07

## 2025-01-07 RX ORDER — DM/PSEUDOEPHED/ACETAMINOPHEN 10-30-250
15 CAPSULE ORAL ONCE
Refills: 0 | Status: DISCONTINUED | OUTPATIENT
Start: 2025-01-07 | End: 2025-01-31

## 2025-01-07 RX ORDER — NALOXONE HYDROCHLORIDE 3 MG/.1ML
0.1 SPRAY NASAL
Refills: 0 | Status: DISCONTINUED | OUTPATIENT
Start: 2025-01-07 | End: 2025-01-31

## 2025-01-07 RX ADMIN — Medication 3: at 17:10

## 2025-01-07 RX ADMIN — HYDROMORPHONE HYDROCHLORIDE 2 MILLIGRAM(S): 4 INJECTION, SOLUTION INTRAMUSCULAR; INTRAVENOUS; SUBCUTANEOUS at 01:00

## 2025-01-07 RX ADMIN — NAFCILLIN INJECTION 200 GRAM(S): 2 POWDER, FOR SOLUTION INTRAMUSCULAR; INTRAMUSCULAR; INTRAVENOUS at 18:27

## 2025-01-07 RX ADMIN — ANTISEPTIC SURGICAL SCRUB 1 APPLICATION(S): 0.04 SOLUTION TOPICAL at 11:10

## 2025-01-07 RX ADMIN — HYDROMORPHONE HYDROCHLORIDE 30 MILLILITER(S): 4 INJECTION, SOLUTION INTRAMUSCULAR; INTRAVENOUS; SUBCUTANEOUS at 19:22

## 2025-01-07 RX ADMIN — PANTOPRAZOLE 40 MILLIGRAM(S): 20 TABLET, DELAYED RELEASE ORAL at 17:17

## 2025-01-07 RX ADMIN — NAFCILLIN INJECTION 200 GRAM(S): 2 POWDER, FOR SOLUTION INTRAMUSCULAR; INTRAMUSCULAR; INTRAVENOUS at 11:01

## 2025-01-07 RX ADMIN — OCTREOTIDE ACETATE 100 MICROGRAM(S): 1000 INJECTION INTRAVENOUS; SUBCUTANEOUS at 17:14

## 2025-01-07 RX ADMIN — DEXTROMETHORPHAN HBR AND GUAIFENESIN ORAL SOLUTION 10 MILLILITER(S): 10; 100 LIQUID ORAL at 11:01

## 2025-01-07 RX ADMIN — Medication 1.25 MILLIGRAM(S): at 15:13

## 2025-01-07 RX ADMIN — HYDROMORPHONE HYDROCHLORIDE 2 MILLIGRAM(S): 4 INJECTION, SOLUTION INTRAMUSCULAR; INTRAVENOUS; SUBCUTANEOUS at 02:00

## 2025-01-07 RX ADMIN — OCTREOTIDE ACETATE 100 MICROGRAM(S): 1000 INJECTION INTRAVENOUS; SUBCUTANEOUS at 05:36

## 2025-01-07 RX ADMIN — Medication 4 UNIT(S): at 05:51

## 2025-01-07 RX ADMIN — HYDROMORPHONE HYDROCHLORIDE 0.2 MILLIGRAM(S): 4 INJECTION, SOLUTION INTRAMUSCULAR; INTRAVENOUS; SUBCUTANEOUS at 00:58

## 2025-01-07 RX ADMIN — PREGABALIN CAPSULES, CV 200 MILLIGRAM(S): 225 CAPSULE ORAL at 05:35

## 2025-01-07 RX ADMIN — Medication 4 MILLILITER(S): at 21:20

## 2025-01-07 RX ADMIN — METHADONE HYDROCHLORIDE 5 MILLIGRAM(S): 5 SOLUTION ORAL at 15:52

## 2025-01-07 RX ADMIN — HYDROMORPHONE HYDROCHLORIDE 0.5 MILLIGRAM(S): 4 INJECTION, SOLUTION INTRAMUSCULAR; INTRAVENOUS; SUBCUTANEOUS at 03:48

## 2025-01-07 RX ADMIN — Medication 40 MILLIGRAM(S): at 05:35

## 2025-01-07 RX ADMIN — Medication 5: at 12:25

## 2025-01-07 RX ADMIN — DEXTROMETHORPHAN HBR AND GUAIFENESIN ORAL SOLUTION 10 MILLILITER(S): 10; 100 LIQUID ORAL at 18:37

## 2025-01-07 RX ADMIN — PREGABALIN CAPSULES, CV 200 MILLIGRAM(S): 225 CAPSULE ORAL at 21:06

## 2025-01-07 RX ADMIN — HYDROMORPHONE HYDROCHLORIDE 2 MILLIGRAM(S): 4 INJECTION, SOLUTION INTRAMUSCULAR; INTRAVENOUS; SUBCUTANEOUS at 06:36

## 2025-01-07 RX ADMIN — HYDROMORPHONE HYDROCHLORIDE 30 MILLILITER(S): 4 INJECTION, SOLUTION INTRAMUSCULAR; INTRAVENOUS; SUBCUTANEOUS at 13:53

## 2025-01-07 RX ADMIN — HYDROMORPHONE HYDROCHLORIDE 1 MILLIGRAM(S): 4 INJECTION, SOLUTION INTRAMUSCULAR; INTRAVENOUS; SUBCUTANEOUS at 10:00

## 2025-01-07 RX ADMIN — Medication 1.25 MILLIGRAM(S): at 07:57

## 2025-01-07 RX ADMIN — Medication 1.25 MILLIGRAM(S): at 21:20

## 2025-01-07 RX ADMIN — HYDROMORPHONE HYDROCHLORIDE 0.5 MILLIGRAM(S): 4 INJECTION, SOLUTION INTRAMUSCULAR; INTRAVENOUS; SUBCUTANEOUS at 02:48

## 2025-01-07 RX ADMIN — Medication 40 MILLIGRAM(S): at 09:55

## 2025-01-07 RX ADMIN — Medication 5: at 07:57

## 2025-01-07 RX ADMIN — Medication 4 MILLILITER(S): at 07:57

## 2025-01-07 RX ADMIN — NAFCILLIN INJECTION 200 GRAM(S): 2 POWDER, FOR SOLUTION INTRAMUSCULAR; INTRAMUSCULAR; INTRAVENOUS at 23:16

## 2025-01-07 RX ADMIN — HYDROMORPHONE HYDROCHLORIDE 1 MILLIGRAM(S): 4 INJECTION, SOLUTION INTRAMUSCULAR; INTRAVENOUS; SUBCUTANEOUS at 09:00

## 2025-01-07 RX ADMIN — HYDROMORPHONE HYDROCHLORIDE 2 MILLIGRAM(S): 4 INJECTION, SOLUTION INTRAMUSCULAR; INTRAVENOUS; SUBCUTANEOUS at 05:36

## 2025-01-07 RX ADMIN — NAFCILLIN INJECTION 200 GRAM(S): 2 POWDER, FOR SOLUTION INTRAMUSCULAR; INTRAMUSCULAR; INTRAVENOUS at 06:31

## 2025-01-07 RX ADMIN — HYDROMORPHONE HYDROCHLORIDE 0.2 MILLIGRAM(S): 4 INJECTION, SOLUTION INTRAMUSCULAR; INTRAVENOUS; SUBCUTANEOUS at 00:07

## 2025-01-07 RX ADMIN — Medication 40 MILLIGRAM(S): at 21:06

## 2025-01-07 RX ADMIN — Medication 500 MILLILITER(S): at 02:48

## 2025-01-07 RX ADMIN — NAFCILLIN INJECTION 200 GRAM(S): 2 POWDER, FOR SOLUTION INTRAMUSCULAR; INTRAMUSCULAR; INTRAVENOUS at 15:45

## 2025-01-07 RX ADMIN — Medication 4 MILLILITER(S): at 15:13

## 2025-01-07 RX ADMIN — METHADONE HYDROCHLORIDE 10 MILLIGRAM(S): 5 SOLUTION ORAL at 21:06

## 2025-01-07 RX ADMIN — Medication 40 MILLIGRAM(S): at 13:38

## 2025-01-07 RX ADMIN — PREGABALIN CAPSULES, CV 200 MILLIGRAM(S): 225 CAPSULE ORAL at 13:38

## 2025-01-07 RX ADMIN — OSELTAMIVIR PHOSPHATE 75 MILLIGRAM(S): 75 CAPSULE ORAL at 17:16

## 2025-01-07 RX ADMIN — NAFCILLIN INJECTION 200 GRAM(S): 2 POWDER, FOR SOLUTION INTRAMUSCULAR; INTRAMUSCULAR; INTRAVENOUS at 02:52

## 2025-01-07 RX ADMIN — OSELTAMIVIR PHOSPHATE 75 MILLIGRAM(S): 75 CAPSULE ORAL at 05:35

## 2025-01-07 RX ADMIN — PANTOPRAZOLE 40 MILLIGRAM(S): 20 TABLET, DELAYED RELEASE ORAL at 05:35

## 2025-01-07 NOTE — PROGRESS NOTE ADULT - ASSESSMENT
40 y/o F w/ PMH of metastatic breast cancer ER/DC Neg, HER-2 pos on chemotherapy (mets to lung, liver, spleen, spine, bone and brain), ?GAVE related bleeding (follows w/ Dr. Rosado GI and is on PPI and octreotide daily) presented for worsening sob over the past few days.  Pt was seen in ED 1/2/25 for for weakness and sob at which time she was found to have Hb of 5 requiring prbc transfusion and positive for Flu A and started on tamiflu.  Pts respirostyr status has worsened since then w/ productive cough but is unable to desribe sputum.  Pt tachycardic, tachypneic w/ O2 at 90% ORA and placed on 2L NC w/ improved saturation.  Clinically toxic appearing speaking 2-3 word sentences w/ course scattered ronchi on exam and expiratory insp/exp scattered wheezing.   Initial w/u significant for leukopenia, thrombocytopenia, RVP +FlA, CTA chest negative for PE but found to have multifocal pna.   Recievied vanco/zosyn and 1550cc IVNS bolus in ED.  [facility] consulted and will admit for sepsis 2/2 multifocal PNA      Sepsis 2/2 Flu A w/ superimposed multifocal PNA  Acute hypoxic respiratory failure 2/2 Flu A w/ superimposed multifocal PNA   - now on HFNC  - CTA negative for PE but w/ pulmonary/alveolar edema and small loculated L-effusion, wide spread patchy b/l air space disease increased from prior image   - persistently positive RVP for flu A  - c/w tamiflu x 5 days  - pulm following   - c/w IV steroids  - c/w abx  - given additional lasix today, may require daily regimen  - TTE EF 65-70%, no WMA    MSSA bacteremia  - blood clx positive 1/5, repeat in process  - ID following  - c/w nafcillin     Chronic normocytic anemia 2/2 metastatic breast cancer on chemo and 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   Thrombocytopenia likely 2/2 sepsis   Breast cancer ER/DC Neg, HER-2 pos on chemotherapy w/ mets to lung, liver, spleen, spine, bone and brain  - Baseline Hb ranges 8-9    - s/p prbc transfusion on 1/2 for Hb5 w/ improvement to 8     - hgb drop to 7.1 1/6, unclear cause as no obvious active bleeding, transfused 1 U  - c/w octreotide and protonix for now  - Plts likely low from sepsis and no intervention required at this time  - Monitor CBC and transfuse for Hb<7 and plts<20K in setting of sepsis   - [facility] consulted   - monitor on tele and     Breast cancer ER/DC Neg, HER-2 pos on chemotherapy w/ mets to lung, liver, spleen, spine, bone and brain  - c/w pregabalin, methylphenidate (confirmed on ISTOP Reference #: [id number])  - Reports being on methadone for pain but last 30 day script on istop dispensed 09/19/24  - Is on po dilaudid 4mg prn w/ last script dispensed 12/04/24   - Will c/w IV dilaudid for analgesia   - pain management consult    Transaminitis w/ cholestatic predominance   - ALP likely from mets to bone but follow GGT to confirm   - AST/ALT relatively unchanged from prior  - t bili trended up, unclear cause  - Trend LFTs     VTE ppx: SCDs, avoid chemical VTE ppx given known brain mets, place on SCDs instead     Dispo: Acute. Anticipated LOS unclear     40 y/o F w/ PMH of metastatic breast cancer ER/MA Neg, HER-2 pos on chemotherapy (mets to lung, liver, spleen, spine, bone and brain), ?GAVE related bleeding (follows w/ Dr. Rosado GI and is on PPI and octreotide daily) presented for worsening sob over the past few days.  Pt was seen in ED 1/2/25 for for weakness and sob at which time she was found to have Hb of 5 requiring prbc transfusion and positive for Flu A and started on tamiflu.  Pts respirostyr status has worsened since then w/ productive cough but is unable to desribe sputum.  Pt tachycardic, tachypneic w/ O2 at 90% ORA and placed on 2L NC w/ improved saturation.  Clinically toxic appearing speaking 2-3 word sentences w/ course scattered ronchi on exam and expiratory insp/exp scattered wheezing.   Initial w/u significant for leukopenia, thrombocytopenia, RVP +FlA, CTA chest negative for PE but found to have multifocal pna.   Recievied vanco/zosyn and 1550cc IVNS bolus in ED.  [facility] consulted and will admit for sepsis 2/2 multifocal PNA      Sepsis 2/2 Flu A w/ superimposed multifocal PNA  Acute hypoxic respiratory failure 2/2 Flu A w/ superimposed multifocal PNA   - now on HFNC  - CTA negative for PE but w/ pulmonary/alveolar edema and small loculated L-effusion, wide spread patchy b/l air space disease increased from prior image   - persistently positive RVP for flu A  - c/w tamiflu x 5 days  - pulm following   - c/w IV steroids  - c/w abx  - sputum clx growing moderate staph aureus   - given additional lasix today, may require daily regimen  - TTE EF 65-70%, no WMA    MSSA bacteremia  - blood clx positive 1/5, repeat in process  - ID following  - c/w nafcillin     Chronic normocytic anemia 2/2 metastatic breast cancer on chemo and 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   Thrombocytopenia likely 2/2 sepsis   Breast cancer ER/MA Neg, HER-2 pos on chemotherapy w/ mets to lung, liver, spleen, spine, bone and brain  - Baseline Hb ranges 8-9    - s/p prbc transfusion on 1/2 for Hb5 w/ improvement to 8     - hgb drop to 7.1 1/6, unclear cause as no obvious active bleeding, transfused 1 U  - c/w octreotide and protonix for now  - Plts likely low from sepsis and no intervention required at this time  - Monitor CBC and transfuse for Hb<7 and plts<20K in setting of sepsis   - [facility] consulted   - monitor on tele and     Breast cancer ER/MA Neg, HER-2 pos on chemotherapy w/ mets to lung, liver, spleen, spine, bone and brain  - c/w pregabalin, methylphenidate (confirmed on ISTOP Reference #: [id number])  - Reports being on methadone for pain but last 30 day script on istop dispensed 09/19/24  - Is on po dilaudid 4mg prn w/ last script dispensed 12/04/24   - Will c/w IV dilaudid for analgesia   - pain management consult    Transaminitis w/ cholestatic predominance   - ALP likely from mets to bone but follow GGT to confirm   - AST/ALT relatively unchanged from prior  - t bili trended up, unclear cause  - Trend LFTs     VTE ppx: SCDs, avoid chemical VTE ppx given known brain mets, place on SCDs instead     Dispo: Acute. Anticipated LOS unclear

## 2025-01-07 NOTE — DIETITIAN INITIAL EVALUATION ADULT - ADD RECOMMEND
1) Continue diet as tolerated.   2) Add Ensure Plus High Protein daily per pt's request.   3) Encourage po intake, monitor diet tolerance, and provide assistance at meals as needed.   4) Rx: MVI daily.   5) Obtain weekly weights to monitor trends.

## 2025-01-07 NOTE — DIETITIAN INITIAL EVALUATION ADULT - PERTINENT LABORATORY DATA
01-07 Na139 mmol/L Glu 394 mg/dL[H] K+ 3.3 mmol/L[L] Cr  0.61 mg/dL BUN 17.5 mg/dL Alb 2.6 g/dL[L]     POCT Blood Glucose.: 389 mg/dL (01-07-25 @ 12:23)  A1C with Estimated Average Glucose Result: 4.2 % (09-28-24 @ 04:38)

## 2025-01-07 NOTE — DIETITIAN INITIAL EVALUATION ADULT - REASON INDICATOR FOR ASSESSMENT
Progress Note  Palliative Care    The [place] location is: [place]  The chief complaint leading to consultation is: symptom management    Visit type: audiovisual    Face to Face time with patient: xx minutes    Xx minutes of total time spent on the encounter, which includes face to face time and non-face to face time preparing to see the patient (eg, review of tests), Obtaining and/or reviewing separately obtained history, Documenting clinical information in the electronic or other health record, Independently interpreting results (not separately reported) and communicating results to the patient/family/caregiver, or Care coordination (not separately reported).     Each patient to whom he or she provides medical services by telemedicine is:  (1) informed of the relationship between the physician and patient and the respective role of any other health care provider with respect to management of the patient; and (2) notified that he or she may decline to receive medical services by telemedicine and may withdraw from such care at any time.    Reason for Consult: symptom-management and ACP      ASSESSMENT/PLAN:     Plan/Recommendations:  Diagnoses and all orders for this visit:    Intrahepatic cholangiocarcinoma  - followed by Dr. Young  - currently on disease-directed therapy  - TACE, chemo therapy   - chemo scheduled changed to every 2 weeks   - s/p emergent appendectomy     Encounter for palliative care/Advanced care planning  - patient decisional  - patient alone on telemedicine today  - no ACP documents uploaded into EMR  - philosophy of Palliative Medicine reviewed with patient and family at first visit  - new patient folder given to and reviewed with patient and family at first visit  - goals: life-prolonging  - he previously spoke about understanding that the treatment is to control the disease at this time  - ACP booklet given to and reviewed with patient and family today including HCPOA and living will  -  "code status not specifically discussed today  - will follow up at future encounters for ACP booklet and code status    Anorexia/Nausea  - previously reported severe cramping, n/v after TACE, bloated, "like stomach filled with air" for ~ 24 hours, eats very little during this period    - continues to report excellent appetitie   - has bentyl PRN for post treatment cramping, has not used, we discuss trial use for his next procedure   - previously prescribed morphine IR for post procedure abd pain, has not used, prefers to rest and let it pass  - previously reported emesis w oxycodone, no longer taking  - PRN bentyl, morphine IR available    - will continue to monitor    Neoplastic (malignant) related fatigue/Weakness/dyspnea  - reporting increased fatigue since hospital discharge  - reports sleeping ~ 10 hours per day, taking all meals in bed   - trying to rest/recover as well as gradually build exercise regimen to tolerance   - previously reported increased fatigue associated with treatment days, sometimes does not want to get out of bed, or naps frequently   - chronic weakness and numbness to right thigh and knee, occasional sensation that feels like "electric shocks"   - weakness and numbness to knee somewhat worsening, difficult to bear weight for more than a couple minutes  - nerve conduction test scheduled for 11/18  - previously referred to PT, patient deferred  - known L leg DVT, no symptoms, anticoagulated   - previously reported reduced activity tolerance, requires frequent rest, for example during cooking he sits down between adding ingredients   - previously tried to walk the dog with his wife and run small errands, enjoying time with his family mostly at home; lately he sits in the car while wife walks dog in the park, he enjoys the opportunity to get out of the house   - discussed balancing activity with rest   - will continue to monitor   - fatigue and weakness are worst in the days post treatment  - " will continue to monitor    Cancer-related pain/neuropathy  - no pain reported at this time  - previously reported post/treatment related pain & intermittent joint pain  - previously taking Oxycodone, causing emesis even w/food, no longer taking  - morphine IR prescribed at previous visit, has not filled, requesting to hold off for now  - cont cymbalta 20 mg daily   - will continue to monitor     Adjustment disorder with mixed anxiety and depressed mood  - patient reports his mood has been good overall, he has good days and bad days  - continues to find enjoyment in word games and puzzles, family provides emotionally supportive environment     - goal to go fishing when weather cools down   - previously following with onc-psych (Dr. Anderson), he finds this helpful   - emotional support provided today  - will continue to monitor    Constipation/diarrhea   - denies constipation and diarrhea   - reports frequent BM, formed   - continue adequate hydration and gentle activity for good bowel movements  - will continue to monitor    Understanding of illness/Prognosis: patient and family have good understanding of disease; prognosis is guarded    Goals of care: life-prolonging    Follow up: ~ 6-8  weeks    Patient's encounter and above plan of care discussed with patient's oncologist     SUBJECTIVE:     History of Present Illness:  Patient is a 74 y.o. year old male with arthritis, CAD, HTN, seizures in 2011, sleep apnea with CPAP, adjustment disorder, and intrahepatic cholangiocarcinoma presents to Palliative Medicine for symptom management and ACP. Please see oncology notes for full details of oncologic history and treatment course.     11/17/2022:  JAVON BROWER reviewed and summarized:  No new entries    Patient presents via virtual visit alone (wife contributes from off screen). He reports that he is doing and feeling very well, overall. His biggest concern is increased fatigue and weakness following his recent emergent  appendectomy. He is sleeping longer than usual and requiring more assistance from his wife but says he's noticing gradual improvement. He reports good appetite and denies pain, n/v, constipation, diarrhea.         09/22/2022:  JAVON BROWER reviewed and summarized:  No new entries    Patient presents alone via virtual visit today. He reports some increased fatigue associated with treatment days, his symptoms are otherwise stable. He recently had a reaction from Imfinzi, this was discontinued, additionally his treatment schedule was adjusted to every 2 weeks due to thrombocytopenia. He denies nausea, pain, constipation and depressed mood. He states that he plans to try the bentyl for TACE associated cramps. He continues to enjoy time with his wife and daughter and helps walk the dog when his energy allows.      08/19/2022:  JAVON BROWER reviewed and summarized:  No new entries     Patient presents via virtual visit alone. His biggest concern is increased fatigue and weakness, accompanied by localized numbness to his right thigh (numbness is newer). This is something he's been experiencing about 4 days after treatment, but recently is more pronounced. He requires frequent breaks while cooking, for example. He rests and naps throughout the day. He's still able to get out occasionally and do routine activities with his wife, but he finds his tolerance for their usual activities is lessoning and finds he is more reliant on his family members, which troubles him. He continues to tolerate post-treatment abdominal pain and cramping without medications. He denies anorexia, anxiety, depression, diarrhea and constipation.    06/21/2022:  JAVON BROWER reviewed and summarized:  05/27/2022 Alprazolam 0.5 mg tabs Disp: 10 for 10 days    Patient presents via virtual visit today with his wife, Esterllita. He reports that he is doing well, overall. His primary complaint is significant leg weakness last weekend which has now resolved. He looks forward to  "starting PT in July. He reports his mood has been overall good, he continues to see Dr. Anderson which he finds helpful. His symptoms are most concentrated in the days following treatment. He hopes using the bentyl after next procedure will alleviate most of his discomfort.  He speaks at length about how happy he is with his cancer care at [facility]. He continues to be able to enjoy time with his family as well as activities that bring him paul.    05/23/2022  [place]  reviewed and summarized:  No recent prescriptions reported    Patient presents today via virtual visit, his wife interjects from off screen. Patient reports he is doing well today. However, following treatment his symptom burden is more prolonged, now lasting about 5 days compared to previously lasting only 2 days. He is experiencing intermittent pain, for which he is no longer taking oxycodone due to side-effect of emesis; he requests a different short-acting PRN medication. He reports that his mood is improved, and that he has good support from his wife, daughter and dog. He volunteers that he is feeling very good about his decision to continue treatment and does not plan to "give up". He is following with onc-psych.     04/25/2022:  [place]  reviewed and summarized:  04/19/2022 Oxycodone 5 mg Disp: 20 for 4 days    Per chart review, patient tolerating treatment of cis/gem well. Today patient states he had TACE last week. He developed cramping, nausea and vomiting for about two days after treatment. He was not able to tolerate oral intake. Patient states that on day three the symptoms resolved. He has been having more fatigue recently as well as weakness. He has to take more breaks to recover. He also has noticed some dyspnea with these symptoms as well. Patient's wife noted some irritability and shortness every now and then. He was also constipated, though this has improved recently.     02/21/2022:  [place]  reviewed and summarized:  12/14/2021 Oxycodone 5 " mg Disp: 90 for 22 days  12/03/2021 Percocet 5-325 mg disp: 6 for 2 days    Patient presents today with his wife. Overall he feels well. Patient is not taking any pain medication at this time. He does have some very intermittent, short lasting pain in his RUQ that occurs with certain actions (eg. Sneezing). Patient had noticed changes in his taste and overall appetite. He has noted improvement recently. He is also having some mild fatigue. Patient and family open to learning about ACP.     Past Medical History:   Diagnosis Date    Adjustment disorder     Anticoagulant long-term use     Anxiety     Arthritis     CAD (coronary artery disease) 03/02/2015    non-obstructive per [facility]     Cataract     Dry eye syndrome     Hypertension     Intrahepatic cholangiocarcinoma 11/29/2021    Obesity     Post-procedural fever 12/18/2021    Seizures     2011    Sleep apnea     + CPAP    Sleep difficulties      Past Surgical History:   Procedure Laterality Date    ANTERIOR CERVICAL DISCECTOMY W/ FUSION N/A 07/06/2020    Procedure: DISCECTOMY, SPINE, CERVICAL, ANTERIOR APPROACH, WITH FUSION C3-4, C4-5;  Surgeon: Fabiola Vides MD;  Location: [facility] OR 2ND FLR;  Service: Neurosurgery;  Laterality: N/A;  TORONTO III, ASA III, BLOOD TYPE & SCREEN, NEUROMONITORING EMG-MEP-SEP, SUPINE POSITION,BRACE [place],REGULAR BED, HEADREST [place], POSITION, MAP>85, RADIOLOGY C-ARM, SPECIAL EQUIPMENT VIRGIL [place]    COLONOSCOPY N/A 10/01/2021    Procedure: COLONOSCOPY;  Surgeon: Nicolas Alvarado MD;  Location: [facility] (4TH FLR);  Service: Endoscopy;  Laterality: N/A;  EGD and colonoscopy for diarrhea and weight loss and Enlarged, heterogeneous appearance of the liver likely due to multiple underlying hepatic lesions largest measuring 4.8 cm.  Findings concerning for metastatic versus less likely primary hepatic neoplasm.  Recommend furth    COLONOSCOPY N/A 06/07/2022    Procedure: COLONOSCOPY;  Surgeon: Mejia Villafuerte MD;  Location: [facility] ([facility]  FLR);  Service: Endoscopy;  Laterality: N/A;  For evaluation of positive out-patient FOBT.   medically urgent  ok to hold Eliquis per CAROLINA Edward/RB  fully vaccinated  hx of seizures- last one 2014    ESOPHAGOGASTRODUODENOSCOPY N/A 10/01/2021    Procedure: EGD (ESOPHAGOGASTRODUODENOSCOPY);  Surgeon: Nicolas Alvarado MD;  Location: [facility] (4TH FLR);  Service: Endoscopy;  Laterality: N/A;  EGD and colonoscopy for diarrhea and weight loss and Enlarged, heterogeneous appearance of the liver likely due to multiple underlying hepatic lesions largest measuring 4.8 cm.  Findings concerning for metastatic versus less likely primary hepatic neopl    ESOPHAGOGASTRODUODENOSCOPY N/A 04/05/2022    Procedure: EGD (ESOPHAGOGASTRODUODENOSCOPY);  Surgeon: Amado Kelsey MD;  Location: [facility] (4TH FLR);  Service: Endoscopy;  Laterality: N/A;  EGD in 8 weeks to check for esophagitis healing patient should be on pantoprazole 40 mg once daily  ok to hold eliquis x2 days per Dr. Young, see telephone encounter  fully vaccinated    EYE SURGERY      cataract    INSERTION OF VENOUS ACCESS PORT N/A 12/03/2021    Procedure: INSERTION, VENOUS ACCESS PORT;  Surgeon: Saurav Garcia MD;  Location: [facility] 2ND FLR;  Service: General;  Laterality: N/A;    INTRAOCULAR PROSTHESES INSERTION Right 07/16/2018    Procedure: INSERTION-INTRAOCULAR LENS (IOL);  Surgeon: Priscilla Hays MD;  Location: [facility];  Service: Ophthalmology;  Laterality: Right;    INTRAOCULAR PROSTHESES INSERTION Left 08/13/2018    Procedure: INSERTION, INTRAOCULAR LENS PROSTHESIS;  Surgeon: Priscilla Hays MD;  Location: [facility];  Service: Ophthalmology;  Laterality: Left;    KNEE SURGERY Right     LAPAROSCOPIC APPENDECTOMY N/A 11/2/2022    Procedure: APPENDECTOMY, LAPAROSCOPIC;  Surgeon: Cheo Hamm MD;  Location: [facility] 2ND FLR;  Service: General;  Laterality: N/A;    PHACOEMULSIFICATION OF CATARACT Right 07/16/2018    Procedure:  PHACOEMULSIFICATION-ASPIRATION-CATARACT;  Surgeon: Priscilla Hays MD;  Location: [facility];  Service: Ophthalmology;  Laterality: Right;    PHACOEMULSIFICATION OF CATARACT Left 08/13/2018    Procedure: PHACOEMULSIFICATION, CATARACT;  Surgeon: Priscilla Hays MD;  Location: [facility];  Service: Ophthalmology;  Laterality: Left;    REPAIR OF INCARCERATED UMBILICAL HERNIA  11/2/2022    Procedure: REPAIR, HERNIA, UMBILICAL, INCARCERATED, AGE 5 YEARS OR OLDER;  Surgeon: Cheo Hamm MD;  Location: [address];  Service: General;;    WISDOM TOOTH EXTRACTION       Family History   Problem Relation Age of Onset    Diabetes Mother     Hypertension Mother     Kidney cancer Mother 61    Cancer Mother         renal & pancreatic    Heart disease Father     No Known Problems Sister     Cancer Maternal Grandmother     Liver disease Maternal Grandmother     Heart disease Paternal Grandfather     Heart disease Brother     No Known Problems Daughter     No Known Problems Son     No Known Problems Sister     No Known Problems Sister     No Known Problems Sister     No Known Problems Daughter     Blindness Neg Hx     Macular degeneration Neg Hx     Retinal detachment Neg Hx     Glaucoma Neg Hx     Melanoma Neg Hx     Pancreatic cancer Neg Hx     Bladder Cancer Neg Hx     Uterine cancer Neg Hx     Ovarian cancer Neg Hx     Celiac disease Neg Hx     Cirrhosis Neg Hx     Colon cancer Neg Hx     Colon polyps Neg Hx     Crohn's disease Neg Hx     Esophageal cancer Neg Hx     Inflammatory bowel disease Neg Hx     Liver cancer Neg Hx     Rectal cancer Neg Hx     Stomach cancer Neg Hx     Ulcerative colitis Neg Hx      Review of patient's allergies indicates:   Allergen Reactions    Imfinzi [durvalumab] Other (See Comments)     1305- Imfinzi ended, pt returned from restroom complained of wheezing SOB and rigors. /100  O2 92% 2L NC O2 applied to pt.   1306-Demerol 25mg given and Solucortef 125mg IVPush given.   1309- Pt BP  "230/106  02 99%.   1318- Pt still having Rigors additional 25mg of Demerol given.   1418- BP is 133/69 77 HR 99% RA Pt states " I feel much better"       Indomethacin Other (See Comments)     Headache dizziness      Adhesive Rash       Medications:    Current Outpatient Medications:     acetaminophen (TYLENOL) 650 MG TbSR, Take by mouth daily as needed., Disp: , Rfl:     apixaban (ELIQUIS) 5 mg Tab, Take 1 tablet (5 mg total) by mouth 2 (two) times daily., Disp: 180 tablet, Rfl: 3    diltiaZEM 2% Lidocaine 5% Cream, Apply pea size amount topically 3 (three) times daily., Disp: 30 g, Rfl: 2    DULoxetine (CYMBALTA) 20 MG capsule, Take 1 capsule (20 mg total) by mouth once daily., Disp: 90 capsule, Rfl: 3    finasteride (PROSCAR) 5 mg tablet, Take 1 tablet (5 mg total) by mouth once daily. (Patient taking differently: Take 5 mg by mouth every morning.), Disp: 90 tablet, Rfl: 3    hydroCHLOROthiazide (HYDRODIURIL) 25 MG tablet, TAKE 1 TABLET (25 MG TOTAL) BY MOUTH ONCE DAILY. (Patient taking differently: Take 25 mg by mouth every morning.), Disp: 90 tablet, Rfl: 3    hydrocortisone 1 % cream, Apply topically 2 (two) times daily. for 7 days, Disp: 30 g, Rfl: 1    magnesium oxide (MAG-OX) 400 mg (241.3 mg magnesium) tablet, Take 400 mg by mouth every evening., Disp: , Rfl:     multivitamin with minerals tablet, Take 1 tablet by mouth every morning. , Disp: , Rfl:     ondansetron (ZOFRAN) 4 MG tablet, Take 1 tablet (4 mg total) by mouth every 8 (eight) hours as needed for Nausea., Disp: 20 tablet, Rfl: 0    oxyCODONE (ROXICODONE) 5 MG immediate release tablet, Take 1 tablet (5 mg total) by mouth every 4 (four) hours as needed., Disp: 10 tablet, Rfl: 0    polyethylene glycol (GLYCOLAX) 17 gram PwPk, Take 17 g by mouth once daily. for 24 days, Disp: , Rfl: 0    potassium chloride SA (K-DUR,KLOR-CON) 20 MEQ tablet, Take 2 tablets (40 mEq total) by mouth once daily. (Patient taking differently: Take 40 mEq by mouth " every morning.), Disp: 180 tablet, Rfl: 3    pravastatin (PRAVACHOL) 40 MG tablet, Take 1 tablet (40 mg total) by mouth once daily. (Patient taking differently: Take 40 mg by mouth every evening.), Disp: 90 tablet, Rfl: 3    prochlorperazine (COMPAZINE) 10 MG tablet, Take 1 tablet (10 mg total) by mouth every 6 (six) hours as needed (nausea)., Disp: 60 tablet, Rfl: 1    promethazine (PHENERGAN) 12.5 MG Tab, Take 1 tablet (12.5 mg total) by mouth every 6 (six) hours as needed (nausea)., Disp: 60 tablet, Rfl: 2    RABEprazole (ACIPHEX) 20 mg tablet, Take 1 tablet (20 mg total) by mouth once daily. (Patient taking differently: Take 20 mg by mouth every morning.), Disp: 90 tablet, Rfl: 3    tamsulosin (FLOMAX) 0.4 mg Cap, TAKE 1 CAPSULE EVERY DAY (Patient taking differently: Take by mouth every evening.), Disp: 90 capsule, Rfl: 0    OBJECTIVE:       ROS:  Review of Systems   Constitutional:  Positive for activity change, appetite change and fatigue (severe).   HENT: Negative.     Eyes: Negative.    Respiratory:  Positive for shortness of breath.    Cardiovascular: Negative.    Genitourinary:  Positive for frequency.   Musculoskeletal: Negative.    Skin: Negative.    Neurological:  Positive for weakness (bi-lat, legs) and numbness (right thigh, toes (mild)).   All other systems reviewed and are negative.    Review of Symptoms      Symptom Assessment (ESAS 0-10 Scale)  Pain:  0  Dyspnea:  0  Anxiety:  0  Nausea:  0  Depression:  0  Anorexia:  0  Fatigue:  9  Insomnia:  0  Restlessness:  0  Agitation:  0     CAM / Delirium:  Negative  Constipation:  Negative  Diarrhea:  Negative (after treatment)      Bowel Management Plan (BMP):  No      Pain Assessment:  OME in 24 hours:  0  Location(s): abdomen    ABDOMEN Location: post TACE. month(s) ago, stable    Modified Isidoro Scale:  1    ECOG Performance Status ndGndrndanddndend:nd nd2nd Living Arrangements:  Lives with spouse    Psychosocial/Cultural: Patient lives with his wife and eldest  daughter. He has a younger daughter that does not live with them anymore. Patient also has a dog named Chance    Spiritual:  F - Annemarie and Belief:  Mu-ism  I - Importance:  High  C - Community:  Yes; good support   A - Address in Care:  Needs met at this time    Advance Care Planning   Advance Directives:   Living Will: No    LaPOST: No    Do Not Resuscitate Status: No    Medical Power of : No    Agent's Name:  Estrellita Kellogg   Agent's Contact Number:  [phone]    Decision Making:  Patient answered questions and Family answered questions  Goals of Care: The patient endorses that what is most important right now is to focus on spending time at home, remaining as independent as possible, symptom/pain control, and life-prolongation.    Accordingly, we have decided that the best plan to meet the patient's goals includes continuing with treatment until side-effects outweigh benefit.         Physical Exam: Limited due to telemedicine visit   Vitals:  no vitals obtained, virtual visit   Physical Exam  Constitutional:       General: He is not in acute distress.     Appearance: He is not toxic-appearing.   HENT:      Head: Normocephalic and atraumatic.      Right Ear: External ear normal.      Left Ear: External ear normal.      Nose: Nose normal.      Mouth/Throat:      Mouth: Mucous membranes are moist.   Eyes:      General: No scleral icterus.        Right eye: No discharge.         Left eye: No discharge.   Neck:      Comments: Trachea midline  Pulmonary:      Effort: Pulmonary effort is normal. No respiratory distress.   Musculoskeletal:      Cervical back: Normal range of motion.      Comments: Sitting up without assistance; able to move upper extremities without limitation   Skin:     General: Skin is dry.      Findings: No erythema or rash.   Neurological:      General: No focal deficit present.      Mental Status: He is alert and oriented to person, place, and time.      Cranial Nerves: No cranial  "nerve deficit.   Psychiatric:         Behavior: Behavior normal.         Thought Content: Thought content normal.         Judgment: Judgment normal.       Labs:  CBC:   WBC   Date Value Ref Range Status   11/06/2022 12.48 3.90 - 12.70 K/uL Final     Hemoglobin   Date Value Ref Range Status   11/06/2022 8.0 (L) 14.0 - 18.0 g/dL Final     POC Hematocrit   Date Value Ref Range Status   11/01/2022 19 (LL) 36 - 54 %PCV Final     Hematocrit   Date Value Ref Range Status   11/06/2022 23.8 (L) 40.0 - 54.0 % Final     MCV   Date Value Ref Range Status   11/06/2022 97 82 - 98 fL Final     Platelets   Date Value Ref Range Status   11/06/2022 191 150 - 450 K/uL Final       LFT:   Lab Results   Component Value Date    AST 70 (H) 11/05/2022    GGT 1,292 (H) 09/20/2021    ALKPHOS 445 (H) 11/05/2022    BILITOT 2.0 (H) 11/05/2022       Albumin:   Albumin   Date Value Ref Range Status   11/05/2022 2.0 (L) 3.5 - 5.2 g/dL Final     Protein:   Total Protein   Date Value Ref Range Status   11/05/2022 5.6 (L) 6.0 - 8.4 g/dL Final       Radiology:I have reviewed all pertinent imaging results/findings within the past 24 hours.    11/01/2022 CT Abd/pelvis: "1. The appendix is dilated to approximately 1.3 cm on axial 113. There is mild wall thickening and enhancement. Mild adjacent stranding. Findings are consistent with acute appendicitis.  Recommend surgical consultation and follow-up.  There is an adjacent 3.4 cm fluid collection on axial 122. There are few small foci of air on axial 114 and sagittal 76.  This could be a fluid-filled component of the cecum, diverticulum or possible adjacent early abscess.  Recommend surgical consultation and follow-up  2. History of cholangiocarcinoma with multiple lobular masses throughout the liver most prominent the right lobe consistent with neoplasm.  3. Mild free fluid/ascites in the abdomen and pelvis.  4. Moderate fat containing umbilical hernia.  5. Urinary bladder wall thickening.  This could be " "associated with chronic bladder outlet obstruction or cystitis.  Follow-up recommended.  6. Prostatomegaly.  7. Diverticulosis of the colon.  Nondistention of the left colon with possible mild wall thickening of the sigmoid colon and descending colon.  Mild acute diverticulitis/colitis of the left colon cannot be excluded.  See above comments.  Follow-up recommended."      10/19/2022 MRI Abd/pelvis: "Impression:    1. Evolving post treatment changes of multifocal intrahepatic cholangiocarcinoma with residual disease as detailed above.  2. Posttreatment changes of segment 3 from several prior chemoembolizations, two of the ablation cavities have decreased in size and one of the ablation cavities has increased in size while the remaining is stable.  3. Several peripherally enhancing lesions in segment 3 all of which have increased in size compared to most recent study, MRI abdomen pelvis 09/02/2022.  4. 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  Numerous upper lobe predominant centrilobular micro nodules are increased since prior exam.  These may represent areas of infectious bronchiolitis, non infectious bronchiolitis, acute hypersensitivity pneumonitis, pulmonary edema, etc..  The pattern of disease does not appear compatible with hematogenous metastasis (which is expected to be randomly distributed).  However, attention on short-term follow-up imaging is recommended.     2. Abdominal findings better evaluated on MRI abdomen same day."    Signature: Alyssa Mooney DNP  " MST score 2 or >

## 2025-01-07 NOTE — CHART NOTE - NSCHARTNOTEFT_GEN_A_CORE
patient removed from continuous neb around 12 am 1/7, patient no longer wheezing only coarse breath sounds. Work of breathing still increased and placed BiPAP nocturnally. Patient does have a tremor from the continuous albuterol nebs.

## 2025-01-07 NOTE — PROGRESS NOTE ADULT - SUBJECTIVE AND OBJECTIVE BOX
[facility]  INFECTIOUS DISEASES at [place] / [place] / [place]  =======================================================                              Salazar Toro MD                              Professor Emeritus:  Dr Warner Mcintosh MD            Diplomates American Board of Internal Medicine & Infectious Diseases                                   Tel  [phone] Fax [phone]                                  Hospital Consult line:  [phone]  =======================================================      [place] MY[place] [id number]    Follow up: MSSA bacteremia    No fevers       Allergies:  pertuzumab (Other (Severe))  Perjeta (Other (Severe))        REVIEW OF SYSTEMS:  CONSTITUTIONAL:  No Fever or chills  HEENT:   No diplopia or blurred vision.  No earache, sore throat or runny nose.  CARDIOVASCULAR:  No Chest Pain  RESPIRATORY:  No cough, shortness of breath  GASTROINTESTINAL:  No nausea, vomiting or diarrhea.  GENITOURINARY:  No dysuria, frequency or urgency. No Blood in urine  MUSCULOSKELETAL:  no joint aches, no muscle pain  SKIN:  No change in skin, hair or nails.  NEUROLOGIC:  No Headaches      Physical Exam:  GEN: NAD  HEENT: normocephalic and atraumatic.    NECK: Supple.   LUNGS: CTA B/L.  HEART: RRR  ABDOMEN: Soft, NT, ND.  +BS.    : No CVA tenderness  EXTREMITIES: Without  edema.  MSK: No joint swelling  NEUROLOGIC: No Focal Deficits   SKIN: No rash  + PORT      Vitals:  T(F): 98.2 (07 Jan 2025 07:57), Max: 99 (07 Jan 2025 02:00)  HR: 102 (07 Jan 2025 10:16)  BP: 109/65 (07 Jan 2025 10:16)  RR: 16 (07 Jan 2025 10:16)  SpO2: 98% (07 Jan 2025 10:16) (96% - 100%)  temp max in last 48H T(F): , Max: 99 (01-07-25 @ 02:00)    Current Antibiotics:  nafcillin  IVPB 2 Gram(s) IV Intermittent every 4 hours  oseltamivir 75 milliGRAM(s) Oral two times a day  oseltamivir 75 milliGRAM(s) Oral two times a day    Other medications:  acetylcysteine 10%  Inhalation 4 milliLiter(s) Inhalation every 6 hours  chlorhexidine 2% Cloths 1 Application(s) Topical daily  dextrose 5%. 1000 milliLiter(s) IV Continuous <Continuous>  dextrose 5%. 1000 milliLiter(s) IV Continuous <Continuous>  dextrose 50% Injectable 25 Gram(s) IV Push once  dextrose 50% Injectable 12.5 Gram(s) IV Push once  dextrose 50% Injectable 25 Gram(s) IV Push once  glucagon  Injectable 1 milliGRAM(s) IntraMuscular once  insulin lispro (ADMELOG) corrective regimen sliding scale   SubCutaneous three times a day before meals  levalbuterol Inhalation 1.25 milliGRAM(s) Inhalation every 6 hours  methylPREDNISolone sodium succinate Injectable 40 milliGRAM(s) IV Push every 8 hours  octreotide  Injectable 100 MICROGram(s) SubCutaneous two times a day  pantoprazole  Injectable 40 milliGRAM(s) IV Push every 12 hours  pregabalin 200 milliGRAM(s) Oral every 8 hours                            7.1    6.03  )-----------( 70       ( 07 Jan 2025 06:15 )             22.1     01-07    139  |  106  |  17.5  ----------------------------<  394[H]  3.3[L]   |  20.0[L]  |  0.61    Ca    7.2[L]      07 Jan 2025 06:15  Mg     2.3     01-07    TPro  5.5[L]  /  Alb  2.6[L]  /  TBili  3.1[H]  /  DBili  2.6[H]  /  AST  16  /  ALT  22  /  AlkPhos  228[H]  01-07    RECENT CULTURES:  01-06 @ 01:34 .Sputum Sputum     Moderate Staphylococcus aureus  Commensal sylvesetr consistent with body site  Moderate Squamous epithelial cells seen per low power field  Moderate polymorphonuclear leukocytes seen per low power field  Few Gram Negative Rods seen per oil power field  Few Gram positive cocci in pairs seen per oil power field  Rare Gram Positive Rods seen per oil power field  Rare Yeast like cells seen per oil power field    01-05 @ 12:34 Clean Catch Clean Catch (Midstream) Escherichia coli    10,000 - 49,000 CFU/mL Escherichia coli  <10,000 CFU/mL Normal Urogenital Sylvester    01-05 @ 10:54 .Blood BLOOD Blood Culture PCR    Growth in anaerobic bottle: Gram Positive Cocci in Clusters  Growth in anaerobic bottle: Gram Positive Cocci in Clusters  Growth in aerobic bottle: Gram Positive Cocci in Clusters  Direct identification is available within approximately 3-5  hours either by Blood Panel Multiplexed PCR or Direct  MALDI-TOF. Details: [email or web address]  wth in anaerobic bottle: Gram Positive Cocci in Clusters  Growth in aerobic bottle: Gram Positive Cocci in Clusters    WBC Count: 6.03 K/uL (01-07-25 @ 06:15)  WBC Count: 6.51 K/uL (01-07-25 @ 02:54)  WBC Count: 2.96 K/uL (01-06-25 @ 07:36)  WBC Count: 2.66 K/uL (01-06-25 @ 06:00)  WBC Count: 2.97 K/uL (01-05-25 @ 19:27)  WBC Count: 3.05 K/uL (01-05-25 @ 10:54)  WBC Count: 7.82 K/uL (01-03-25 @ 03:59)  WBC Count: 4.86 K/uL (01-02-25 @ 17:51)    Creatinine: 0.61 mg/dL (01-07-25 @ 06:15)  Creatinine: 0.49 mg/dL (01-06-25 @ 07:36)  Creatinine: 0.44 mg/dL (01-05-25 @ 10:54)  Creatinine: 0.39 mg/dL (01-02-25 @ 17:51)    Procalcitonin: 2.32 ng/mL (01-07-25 @ 06:15)     SARS-CoV-2: NotDetec (01-05-25 @ 10:54)  SARS-CoV-2 Result: NotDetec (01-02-25 @ 19:20)    Influenza AH3 (RapRVP): Detected (01.05.25 @ 10:54)      Urinalysis + Microscopic Examination (01.06.25 @ 03:00)    pH Urine: 6.0   Urine Appearance: Clear   Color: Yellow   Specific Gravity: 1.015   Protein, Urine: Trace mg/dL   Glucose Qualitative, Urine: Negative mg/dL   Ketone - Urine: Negative mg/dL   Blood, Urine: Small   Bilirubin: Negative   Urobilinogen: 1.0 mg/dL   Leukocyte Esterase Concentration: Small   Nitrite: Negative   White Blood Cell - Urine: 6 /HPF   Red Blood Cell - Urine: 5 /HPF   Bacteria: Occasional /HPF   Epithelial Cells: 2 /HPF      < from: TTE Limited W or WO Ultrasound Enhancing Agent (01.06.25 @ 12:55) >  CONCLUSIONS:      1. Technically difficult image quality.   2. Left ventricular systolic function is normal with an ejection fraction visually estimated at 65 to 70 %.   3. Normal right ventricular cavity size, with normal wall thickness, and normal right ventricular systolic function.   4. Compared to the transthoracic echocardiogram performed on 7/21/2024, there have been no significant interval changes.    < end of copied text >

## 2025-01-07 NOTE — PROGRESS NOTE ADULT - ASSESSMENT
39y  Female with h/o metastatic breast cancer ER/TN Neg, HER-2 + on chemotherapy (last dose 12/26/24) (mets to lung, liver, spleen, spine, bone and brain), gastritis w/ intermittent episodes of dark stool (follows w/ Dr. Rosado GI and is on PPI and octreotide daily). Patient presented 1/5 with c/o worsening SOB.  She was seen in ED 1/2/25 for for weakness and SOB at which time she was found to have Hb of 5 requiring PRBC transfusion and positive for Flu A and started on tamiflu and was discharged from the ED on 1/3/25.  Her respiratory status has worsened since then w/ productive cough associated with body aches and chills.  Denies sick contacts but has had many frequent hospital visits. Patient has been afebrile, no leukocytosis. Was placed on BIPAP for Worsening respiratory status. continued on Tamiflu. Vancomycn and Zosyn was added. Blood cultures with MSSA.      39y  Female with h/o metastatic breast cancer ER/ME Neg, HER-2 + on chemotherapy (last dose 12/26/24) (mets to lung, liver, spleen, spine, bone and brain), gastritis w/ intermittent episodes of dark stool (follows w/ Dr. Rosado GI and is on PPI and octreotide daily). Patient presented 1/5 with c/o worsening SOB.  She was seen in ED 1/2/25 for for weakness and SOB at which time she was found to have Hb of 5 requiring PRBC transfusion and positive for Flu A and started on tamiflu and was discharged from the ED on 1/3/25.  Her respiratory status has worsened since then w/ productive cough associated with body aches and chills.  Denies sick contacts but has had many frequent hospital visits. Patient has been afebrile, no leukocytosis. Was placed on BIPAP for Worsening respiratory status. continued on Tamiflu. Vancomycn and Zosyn was added. Blood cultures with MSSA.     Metastatic breast cancer - last dose of trastuzumab was on 12/26/24.  All treatment on hold at the moment.  Follow up with primary oncologist, Dr. Chapa.    Cytopenias - secondary to malignancy. Platelet count is lower than baseline at 66k.   Drop also due to acute illness.  Hgb 7.1. No bleeding reported.  Transfuse if hgb<7.0.    Respiratory failure - multifocal pneumonia.  On nafcillin, on HFNC  On oseltamivir.  Management as per primary team.

## 2025-01-07 NOTE — DIETITIAN INITIAL EVALUATION ADULT - OTHER INFO
Per Hospitalist note "38 y/o F w/ PMH of metastatic breast cancer ER/[place] Neg, HER-2 pos on chemotherapy (mets to lung, liver, spleen, spine, bone and brain), ?GAVE related bleeding (follows w/ Dr. Rosado GI and is on PPI and octreotide daily) presented for worsening sob over the past few days."

## 2025-01-07 NOTE — DIETITIAN INITIAL EVALUATION ADULT - PERTINENT MEDS FT
MEDICATIONS  (STANDING):  dextrose 5%. 1000 milliLiter(s) (100 mL/Hr) IV Continuous <Continuous>  dextrose 50% Injectable 25 Gram(s) IV Push once  glucagon  Injectable 1 milliGRAM(s) IntraMuscular once  HYDROmorphone PCA (1 mG/mL) 30 milliLiter(s) PCA Continuous PCA Continuous  insulin lispro (ADMELOG) corrective regimen sliding scale   SubCutaneous three times a day before meals  methadone    Tablet 10 milliGRAM(s) Oral daily  methylPREDNISolone sodium succinate Injectable 40 milliGRAM(s) IV Push every 8 hours  nafcillin  IVPB 2 Gram(s) IV Intermittent every 4 hours  octreotide  Injectable 100 MICROGram(s) SubCutaneous two times a day  oseltamivir 75 milliGRAM(s) Oral two times a day  oseltamivir 75 milliGRAM(s) Oral two times a day  pantoprazole  Injectable 40 milliGRAM(s) IV Push every 12 hours  pregabalin 200 milliGRAM(s) Oral every 8 hours    MEDICATIONS  (PRN):  dextrose Oral Gel 15 Gram(s) Oral once PRN Blood Glucose LESS THAN 70 milliGRAM(s)/deciliter  ondansetron Injectable 4 milliGRAM(s) IV Push every 8 hours PRN Nausea and/or Vomiting

## 2025-01-07 NOTE — CONSULT NOTE ADULT - SUBJECTIVE AND OBJECTIVE BOX
Patient is a 39y old  Female who presents with a chief complaint of sepsis (07 Jan 2025 11:43)      HPI:  38 y/o F w/ PMH of metastatic breast cancer ER/[place] Neg, HER-2 pos on chemotherapy (mets to lung, liver, spleen, spine, bone and brain), gastritis w/ intermittent episodes of dark stool (follows w/ Dr. Rosado GI and is on PPI and octreotide daily) presented for worsening sob over the past few days.  Pt was seen in ED 1/2/25 for for weakness and sob at which time she was found to have Hb of 5 requiring prbc transfusion and positive for Flu A and started on tamiflu.  Pts respiratory status has worsened since then w/ productive cough but is unable to describe sputum.  Pt reports body aches and chills.  Pt denies sick contacts but has had many frequent hospital visits.  She denies abd pain, N/V, diarrhea, dysuria, cp, palpitations.   (05 Jan 2025 16:40)            Analgesic Dosing for past 24 hours reviewed as below:    HYDROmorphone  Injectable   2 milliGRAM(s) IV Push (01-07-25 @ 05:36)   2 milliGRAM(s) IV Push (01-07-25 @ 01:00)   2 milliGRAM(s) IV Push (01-06-25 @ 21:50)    HYDROmorphone  Injectable   1 milliGRAM(s) IV Push (01-07-25 @ 09:00)   1 milliGRAM(s) IV Push (01-06-25 @ 15:37)    HYDROmorphone  Injectable   0.2 milliGRAM(s) IV Push (01-07-25 @ 00:07)   0.2 milliGRAM(s) IV Push (01-06-25 @ 20:59)    HYDROmorphone  Injectable   0.5 milliGRAM(s) IV Push (01-07-25 @ 02:48)    melatonin   3 milliGRAM(s) Oral (01-06-25 @ 21:06)    pregabalin   200 milliGRAM(s) Oral (01-07-25 @ 05:35)   200 milliGRAM(s) Oral (01-06-25 @ 21:06)   200 milliGRAM(s) Oral (01-06-25 @ 15:32)          T(C): 36.8 (01-07-25 @ 07:57), Max: 37.2 (01-06-25 @ 15:00)  HR: 104 (01-07-25 @ 12:37) (89 - 133)  BP: 111/68 (01-07-25 @ 12:37) (93/57 - 115/82)  RR: 16 (01-07-25 @ 12:37) (15 - 17)  SpO2: 98% (01-07-25 @ 12:37) (96% - 100%)      01-06-25 @ 07:01  -  01-07-25 @ 07:00  --------------------------------------------------------  IN: 1373 mL / OUT: 0 mL / NET: 1373 mL    01-07-25 @ 07:01  -  01-07-25 @ 12:45  --------------------------------------------------------  IN: 100 mL / OUT: 0 mL / NET: 100 mL        acetaminophen     Tablet .. 650 milliGRAM(s) Oral every 6 hours PRN  acetylcysteine 10%  Inhalation 4 milliLiter(s) Inhalation every 6 hours  aluminum hydroxide/magnesium hydroxide/simethicone Suspension 30 milliLiter(s) Oral every 4 hours PRN  benzonatate 100 milliGRAM(s) Oral every 8 hours PRN  chlorhexidine 2% Cloths 1 Application(s) Topical daily  dextrose 5%. 1000 milliLiter(s) IV Continuous <Continuous>  dextrose 5%. 1000 milliLiter(s) IV Continuous <Continuous>  dextrose 50% Injectable 25 Gram(s) IV Push once  dextrose 50% Injectable 12.5 Gram(s) IV Push once  dextrose 50% Injectable 25 Gram(s) IV Push once  dextrose Oral Gel 15 Gram(s) Oral once PRN  glucagon  Injectable 1 milliGRAM(s) IntraMuscular once  guaifenesin/dextromethorphan Oral Liquid 10 milliLiter(s) Oral every 4 hours PRN  HYDROmorphone  Injectable 2 milliGRAM(s) IV Push every 3 hours PRN  HYDROmorphone  Injectable 1 milliGRAM(s) IV Push every 3 hours PRN  HYDROmorphone  Injectable 0.2 milliGRAM(s) IV Push every 3 hours PRN  insulin lispro (ADMELOG) corrective regimen sliding scale   SubCutaneous three times a day before meals  levalbuterol Inhalation 1.25 milliGRAM(s) Inhalation every 6 hours  melatonin 3 milliGRAM(s) Oral at bedtime PRN  methylPREDNISolone sodium succinate Injectable 40 milliGRAM(s) IV Push every 8 hours  nafcillin  IVPB 2 Gram(s) IV Intermittent every 4 hours  octreotide  Injectable 100 MICROGram(s) SubCutaneous two times a day  ondansetron Injectable 4 milliGRAM(s) IV Push every 8 hours PRN  oseltamivir 75 milliGRAM(s) Oral two times a day  oseltamivir 75 milliGRAM(s) Oral two times a day  pantoprazole  Injectable 40 milliGRAM(s) IV Push every 12 hours  pregabalin 200 milliGRAM(s) Oral every 8 hours                          7.1    7.43  )-----------( 73       ( 07 Jan 2025 12:23 )             22.0     01-07    139  |  106  |  17.5  ----------------------------<  394[H]  3.3[L]   |  20.0[L]  |  0.61    Ca    7.2[L]      07 Jan 2025 06:15  Mg     2.3     01-07    TPro  5.5[L]  /  Alb  2.6[L]  /  TBili  3.1[H]  /  DBili  2.6[H]  /  AST  16  /  ALT  22  /  AlkPhos  228[H]  01-07      Urinalysis Basic - ( 07 Jan 2025 06:15 )    Color: x / Appearance: x / SG: x / pH: x  Gluc: 394 mg/dL / Ketone: x  / Bili: x / Urobili: x   Blood: x / Protein: x / Nitrite: x   Leuk Esterase: x / RBC: x / WBC x   Sq Epi: x / Non Sq Epi: x / Bacteria: x        Pain Service   [phone]

## 2025-01-07 NOTE — DIETITIAN INITIAL EVALUATION ADULT - ORAL INTAKE PTA/DIET HISTORY
Patient tolerating current diet well with good fair appetite/PO intake. Pt reports eating about 50-60% of breakfast and lunch meals today. Pt states PTA her appetite was good and she was eating 2-3 meals/day with snacks. Denies following ant specific diet restrictions. Pt with c/o nausea at times, but no vomiting. Pt with no c/o C/D and reports her last BM was today. Pt states her UBW is about 140 lbs, but has been gaining weight over the past 3 months due to medication. Current weight 149.2 lbs. Mild edema noted which may influence weights. Pt asking for chocolate Ensure shake daily - discussed the same with NP on unit and agreeable. Pt with no further questions or concerns at this time regarding nutrition. Will continue to monitor and follow up as needed. RD remains available.

## 2025-01-07 NOTE — CONSULT NOTE ADULT - ASSESSMENT
38 y/o female with h/o metastatic breast cancer , mets to brain   . MRI brain shows new hemorrhagic mets. Pain management consulted for pain management.      dPCA 0/0.3/8/6    restart home methadone 5/5/10    can DC iv dilaudid orders when pca set up    when due for discharge:  DC PCA  - Recommend starting dilaudid PO 4mg/8mg [id number] PRN mod/severe pain

## 2025-01-07 NOTE — CHART NOTE - NSCHARTNOTEFT_GEN_A_CORE
Glucose on   Accucheck 412, 4 units admelog  Lactate 4.5 on ABG  Sepsis workup done yesterday  Afebrile WBCs WNL  Pt on Nafcillin  Metastatic Cancer  Venous Lactate ordered  Discussed with Dr Mayo  Continue to monitor

## 2025-01-07 NOTE — PROGRESS NOTE ADULT - SUBJECTIVE AND OBJECTIVE BOX
39y  Female with h/o metastatic breast cancer ER/MD Neg, HER-2 + on chemotherapy (last dose 12/26/24) (mets to lung, liver, spleen, spine, bone and brain), gastritis w/ intermittent episodes of dark stool (follows w/ Dr. Alonzo BISHOP and is on PPI and octreotide daily). Patient presented 1/5 with c/o worsening SOB.  She was seen in ED 1/2/25 for for weakness and SOB at which time she was found to have Hb of 5 requiring PRBC transfusion and positive for Flu A and started on tamiflu and was discharged from the ED on 1/3/25.  Her respiratory status has worsened since then w/ productive cough associated with body aches and chills.  Denies sick contacts but has had many frequent hospital visits. Patient has been afebrile, no leukocytosis. Was placed on BIPAP for Worsening respiratory status. continued on Tamiflu. Vancomycn and Zosyn was added. Blood cultures with MSSA.     1/7/25: Evaluated by by ID, on nafcillin.  Being continued on Tamiflu.  Hemoglobin 7.1      CBC:            7.1    6.03  )-----------( 70       ( 01-07-25 @ 06:15 )             22.1         Chem:         ( 01-07-25 @ 06:15 )    139  |  106  |  17.5  ----------------------------<  394[H]  3.3[L]   |  20.0[L]  |  0.61        Liver Functions: ( 01-07-25 @ 06:15 )  Alb: 2.6 g/dL / Pro: 5.5 g/dL / ALK PHOS: 228 U/L / ALT: 22 U/L / AST: 16 U/L / GGT: x              Type & Screen:           MEDICATIONS  (STANDING):  acetylcysteine 10%  Inhalation 4 milliLiter(s) Inhalation every 6 hours  chlorhexidine 2% Cloths 1 Application(s) Topical daily  dextrose 5%. 1000 milliLiter(s) (50 mL/Hr) IV Continuous <Continuous>  dextrose 5%. 1000 milliLiter(s) (100 mL/Hr) IV Continuous <Continuous>  dextrose 50% Injectable 25 Gram(s) IV Push once  dextrose 50% Injectable 12.5 Gram(s) IV Push once  dextrose 50% Injectable 25 Gram(s) IV Push once  glucagon  Injectable 1 milliGRAM(s) IntraMuscular once  insulin lispro (ADMELOG) corrective regimen sliding scale   SubCutaneous three times a day before meals  levalbuterol Inhalation 1.25 milliGRAM(s) Inhalation every 6 hours  methylPREDNISolone sodium succinate Injectable 40 milliGRAM(s) IV Push every 8 hours  nafcillin  IVPB 2 Gram(s) IV Intermittent every 4 hours  octreotide  Injectable 100 MICROGram(s) SubCutaneous two times a day  oseltamivir 75 milliGRAM(s) Oral two times a day  oseltamivir 75 milliGRAM(s) Oral two times a day  pantoprazole  Injectable 40 milliGRAM(s) IV Push every 12 hours  pregabalin 200 milliGRAM(s) Oral every 8 hours      Past Medical & Surgical Hx:  H/O compression fracture of spine  Anxiety  Metastatic breast cancer      H/O pleural effusion  Pericardial effusion  S/P tonsillectomy  H/O chest tube placement 12/23/21  S/P pericardiocentesis 12/28/21      Social Hx:  Denies smoking      FAMILY HISTORY:  FH: CVA (cerebrovascular accident)      Allergies  pertuzumab (Other (Severe))  Perjeta (Other (Severe))       REVIEW OF SYSTEMS:  Limited, patient is on BIPAP  no complaints        39y  Female with h/o metastatic breast cancer ER/[place] Neg, HER-2 + on chemotherapy (last dose 12/26/24) (mets to lung, liver, spleen, spine, bone and brain), gastritis w/ intermittent episodes of dark stool (follows w/ Dr. Alonzo BISHOP and is on PPI and octreotide daily). Patient presented 1/5 with c/o worsening SOB.  She was seen in ED 1/2/25 for for weakness and SOB at which time she was found to have Hb of 5 requiring PRBC transfusion and positive for Flu A and started on tamiflu and was discharged from the ED on 1/3/25.  Her respiratory status has worsened since then w/ productive cough associated with body aches and chills.  Denies sick contacts but has had many frequent hospital visits. Patient has been afebrile, no leukocytosis. Was placed on BIPAP for Worsening respiratory status. continued on Tamiflu. Vancomycn and Zosyn was added. Blood cultures with MSSA.     1/7/25: Evaluated by by ID, on nafcillin.  Being continued on Tamiflu.  Hemoglobin 7.1. Plan for PCA pump today.       CBC:            7.1    6.03  )-----------( 70       ( 01-07-25 @ 06:15 )             22.1       Chem:         ( 01-07-25 @ 06:15 )    139  |  106  |  17.5  ----------------------------<  394[H]  3.3[L]   |  20.0[L]  |  0.61    Liver Functions: ( 01-07-25 @ 06:15 )  Alb: 2.6 g/dL / Pro: 5.5 g/dL / ALK PHOS: 228 U/L / ALT: 22 U/L / AST: 16 U/L / GGT: x          MEDICATIONS  (STANDING):  acetylcysteine 10%  Inhalation 4 milliLiter(s) Inhalation every 6 hours  chlorhexidine 2% Cloths 1 Application(s) Topical daily  dextrose 5%. 1000 milliLiter(s) (50 mL/Hr) IV Continuous <Continuous>  dextrose 5%. 1000 milliLiter(s) (100 mL/Hr) IV Continuous <Continuous>  dextrose 50% Injectable 25 Gram(s) IV Push once  dextrose 50% Injectable 12.5 Gram(s) IV Push once  dextrose 50% Injectable 25 Gram(s) IV Push once  glucagon  Injectable 1 milliGRAM(s) IntraMuscular once  insulin lispro (ADMELOG) corrective regimen sliding scale   SubCutaneous three times a day before meals  levalbuterol Inhalation 1.25 milliGRAM(s) Inhalation every 6 hours  methylPREDNISolone sodium succinate Injectable 40 milliGRAM(s) IV Push every 8 hours  nafcillin  IVPB 2 Gram(s) IV Intermittent every 4 hours  octreotide  Injectable 100 MICROGram(s) SubCutaneous two times a day  oseltamivir 75 milliGRAM(s) Oral two times a day  oseltamivir 75 milliGRAM(s) Oral two times a day  pantoprazole  Injectable 40 milliGRAM(s) IV Push every 12 hours  pregabalin 200 milliGRAM(s) Oral every 8 hours      Past Medical & Surgical Hx:  H/O compression fracture of spine  Anxiety  Metastatic breast cancer      H/O pleural effusion  Pericardial effusion  S/P tonsillectomy  H/O chest tube placement 12/23/21  S/P pericardiocentesis 12/28/21      Social Hx:  Denies smoking      FAMILY HISTORY:  FH: CVA (cerebrovascular accident)      Allergies  pertuzumab (Other (Severe))  Perjeta (Other (Severe))       REVIEW OF SYSTEMS:  Limited, patient is on BIPAP  no complaints

## 2025-01-07 NOTE — CONSULT NOTE ADULT - SUBJECTIVE AND OBJECTIVE BOX
CHIEF COMPLAINT:    HPI: 39y  Female with h/o metastatic breast cancer ER/[place] Neg, HER-2 + on chemotherapy (last dose 12/26/24) (mets to lung, liver, spleen, spine, bone and brain), gastritis w/ intermittent episodes of dark stool (follows w/ Dr. Rosado GI and is on PPI and octreotide daily). Patient presented 1/5 with c/o worsening SOB.  She was seen in ED 1/2/25 for for weakness and SOB at which time she was found to have Hb of 5 requiring PRBC transfusion and positive for Flu A and started on tamiflu and was discharged from the ED on 1/3/25.  Her respiratory status has worsened since then w/ productive cough associated with body aches and chills.  Denies sick contacts but has had many frequent hospital visits. Patient has been afebrile, no leukocytosis. Currently on HFNC for worsening respiratory status. continued on Tamiflu. Vancomycn and Zosyn was added. Blood cultures with MSSA.  Cardiology consultation requested for evaluation of endocarditis.     PAST MEDICAL & SURGICAL HISTORY:  H/O compression fracture of spine      Anxiety      Metastatic breast cancer      H/O pleural effusion      Pericardial effusion      S/P tonsillectomy      H/O chest tube placement  12/23/21      S/P pericardiocentesis  12/28/21          Allergies    pertuzumab (Other (Severe))  Perjeta (Other (Severe))    Intolerances        MEDICATIONS  (STANDING):  acetylcysteine 10%  Inhalation 4 milliLiter(s) Inhalation every 6 hours  chlorhexidine 2% Cloths 1 Application(s) Topical daily  dextrose 5%. 1000 milliLiter(s) (50 mL/Hr) IV Continuous <Continuous>  dextrose 5%. 1000 milliLiter(s) (100 mL/Hr) IV Continuous <Continuous>  dextrose 50% Injectable 25 Gram(s) IV Push once  dextrose 50% Injectable 12.5 Gram(s) IV Push once  dextrose 50% Injectable 25 Gram(s) IV Push once  glucagon  Injectable 1 milliGRAM(s) IntraMuscular once  HYDROmorphone PCA (1 mG/mL) 30 milliLiter(s) PCA Continuous PCA Continuous  insulin lispro (ADMELOG) corrective regimen sliding scale   SubCutaneous three times a day before meals  levalbuterol Inhalation 1.25 milliGRAM(s) Inhalation every 6 hours  methadone    Tablet 10 milliGRAM(s) Oral at bedtime  methylPREDNISolone sodium succinate Injectable 40 milliGRAM(s) IV Push every 8 hours  nafcillin  IVPB 2 Gram(s) IV Intermittent every 4 hours  octreotide  Injectable 100 MICROGram(s) SubCutaneous two times a day  oseltamivir 75 milliGRAM(s) Oral two times a day  oseltamivir 75 milliGRAM(s) Oral two times a day  pantoprazole  Injectable 40 milliGRAM(s) IV Push every 12 hours  pregabalin 200 milliGRAM(s) Oral every 8 hours    MEDICATIONS  (PRN):  acetaminophen     Tablet .. 650 milliGRAM(s) Oral every 6 hours PRN Temp greater or equal to 38C (100.4F), Mild Pain (1 - 3)  aluminum hydroxide/magnesium hydroxide/simethicone Suspension 30 milliLiter(s) Oral every 4 hours PRN Dyspepsia  benzonatate 100 milliGRAM(s) Oral every 8 hours PRN Cough  dextrose Oral Gel 15 Gram(s) Oral once PRN Blood Glucose LESS THAN 70 milliGRAM(s)/deciliter  guaifenesin/dextromethorphan Oral Liquid 10 milliLiter(s) Oral every 4 hours PRN Cough  melatonin 3 milliGRAM(s) Oral at bedtime PRN Insomnia  naloxone Injectable 0.1 milliGRAM(s) IV Push every 3 minutes PRN For ANY of the following changes in patient status:  A. RR LESS THAN 10 breaths per minute, B. Oxygen saturation LESS THAN 90%, C. Sedation score of 6  ondansetron Injectable 4 milliGRAM(s) IV Push every 8 hours PRN Nausea and/or Vomiting      FAMILY HISTORY:  FH: CVA (cerebrovascular accident)        ***No family history of premature coronary artery disease or sudden cardiac death    SOCIAL HISTORY:  Smoking-  Alcohol-  Illicit Drug use-    REVIEW OF SYSTEMS:  Constitutional: [ ] fever, [ ]weight loss,  [ ]fatigue  Eyes: [ ] visual changes  Respiratory: [ ]shortness of breath;  [ ] cough, [ ]wheezing, [ ]chills, [ ]hemoptysis  Cardiovascular: [ ] chest pain, [ ]palpitations, [ ]dizziness,  [ ]leg swelling [ ]syncope  Gastrointestinal: [ ] abdominal pain, [ ]nausea, [ ]vomiting,  [ ]diarrhea   Genitourinary: [ ] dysuria, [ ] hematuria  Neurologic: [ ] headaches [ ] tremors  [ ] weakness [ ] lightheadedness  Skin: [ ] itching, [ ]burning, [ ] rashes  Endocrine: [ ] heat or cold intolerance  Musculoskeletal: [ ] joint pain or swelling; [ ] muscle, back, or extremity pain  Psychiatric: [ ] depression, [ ]anxiety, [ ]mood swings, or [ ]difficulty sleeping  Hematologic: [ ] easy bruising, [ ] bleeding gums     [ x] All others negative	  [ ] Unable to obtain    Vital Signs Last 24 Hrs  T(C): 36.9 (07 Jan 2025 15:36), Max: 37.2 (06 Jan 2025 19:10)  T(F): 98.4 (07 Jan 2025 15:36), Max: 99 (07 Jan 2025 02:00)  HR: 107 (07 Jan 2025 16:07) (99 - 133)  BP: 117/66 (07 Jan 2025 16:07) (93/57 - 117/66)  BP(mean): 82 (07 Jan 2025 16:07) (66 - 92)  RR: 16 (07 Jan 2025 16:07) (16 - 17)  SpO2: 92% (07 Jan 2025 16:07) (92% - 100%)    Parameters below as of 07 Jan 2025 16:07  Patient On (Oxygen Delivery Method): nasal cannula, high flow      I&O's Summary    06 Jan 2025 07:01  -  07 Jan 2025 07:00  --------------------------------------------------------  IN: 1373 mL / OUT: 0 mL / NET: 1373 mL    07 Jan 2025 07:01  -  07 Jan 2025 16:50  --------------------------------------------------------  IN: 200 mL / OUT: 0 mL / NET: 200 mL        PHYSICAL EXAM:  General: On NC  HEENT: EOMI  Neck:  No JVD  Lungs: Clear to auscultation bilaterally; No rales or wheezing  Heart: Regular rate and rhythm; No murmurs, rubs, or gallops  Abdomen: soft, non tender, non distended   Extremities: warm, no edema   Nervous system:  Alert & Oriented X3  Psychiatric: Normal affect  Skin: No rashes or lesions    LABS:  01-07    139  |  106  |  17.5  ----------------------------<  394[H]  3.3[L]   |  20.0[L]  |  0.61    Ca    7.2[L]      07 Jan 2025 06:15  Mg     2.3     01-07    TPro  5.5[L]  /  Alb  2.6[L]  /  TBili  3.1[H]  /  DBili  2.6[H]  /  AST  16  /  ALT  22  /  AlkPhos  228[H]  01-07    Creatinine Trend: 0.61<--, 0.49<--, 0.44<--, 0.39<--, 0.49<--                        7.1    7.43  )-----------( 73       ( 07 Jan 2025 12:23 )             22.0         Lipid Panel:   Cardiac Enzymes:           RADIOLOGY: < from: Xray Chest 1 View-PORTABLE IMMEDIATE (Xray Chest 1 View-PORTABLE IMMEDIATE .) (01.06.25 @ 18:57) >  Diffuse bilateral infiltrates.      < from: 12 Lead ECG (01.06.25 @ 06:25) >  Normal sinus rhythm  Nonspecific T wave abnormality        TELEMETRY: SR    ECHO: < from: TTE Limited W or WO Ultrasound Enhancing Agent (01.06.25 @ 12:55) >  1. Technically difficult image quality.   2. Left ventricular systolic function is normal with an ejection fraction visually estimated at 65 to 70 %.   3. Normal right ventricular cavity size, with normal wall thickness, and normal right ventricular systolic function.   4. Compared to the transthoracic echocardiogram performed on 7/21/2024, there have been no significant interval changes        < from: TTE W or WO Ultrasound Enhancing Agent (07.21.24 @ 15:40) >  1. Left ventricular cavity is normal in size. Left ventricular wall thickness is normal. Left ventricular systolic function is normal with an ejection fraction visually estimated at 55 to 60 %.   2. Normal left ventricular diastolic function.   3. Normal right ventricular cavity size and normal right ventricular systolic function.   4. The left atrium is normal in size.   5. The right atrium is normal in size.   6. Trileaflet aortic valve with normal systolic excursion.   7. Structurally normal mitral valve with normal leaflet excursion.   8. Trace mitral regurgitation.   9. Mild tricuspid regurgitation.  10. Estimated pulmonary artery systolic pressure is 23 mmHg, normal pulmonary artery pressure.  11. The interatrial septum appears intact.  12. No pericardial effusion seen.        STRESS TEST:    CATHETERIZATION:

## 2025-01-07 NOTE — PROGRESS NOTE ADULT - SUBJECTIVE AND OBJECTIVE BOX
[facility] Division of Medicine    SUBJECTIVE / OVERNIGHT EVENTS: No overnight events as per RN. Pt seen at the bedside. Endorses continued chest pressure and trouble with breathing. Denies any new complaints. All other systems reviewed and are negative.    MEDICATIONS  (STANDING):  acetylcysteine 10%  Inhalation 4 milliLiter(s) Inhalation every 6 hours  chlorhexidine 2% Cloths 1 Application(s) Topical daily  dextrose 5%. 1000 milliLiter(s) (50 mL/Hr) IV Continuous <Continuous>  dextrose 5%. 1000 milliLiter(s) (100 mL/Hr) IV Continuous <Continuous>  dextrose 50% Injectable 25 Gram(s) IV Push once  dextrose 50% Injectable 12.5 Gram(s) IV Push once  dextrose 50% Injectable 25 Gram(s) IV Push once  glucagon  Injectable 1 milliGRAM(s) IntraMuscular once  insulin lispro (ADMELOG) corrective regimen sliding scale   SubCutaneous three times a day before meals  levalbuterol Inhalation 1.25 milliGRAM(s) Inhalation every 6 hours  methylPREDNISolone sodium succinate Injectable 40 milliGRAM(s) IV Push every 8 hours  nafcillin  IVPB 2 Gram(s) IV Intermittent every 4 hours  octreotide  Injectable 100 MICROGram(s) SubCutaneous two times a day  oseltamivir 75 milliGRAM(s) Oral two times a day  oseltamivir 75 milliGRAM(s) Oral two times a day  pantoprazole  Injectable 40 milliGRAM(s) IV Push every 12 hours  pregabalin 200 milliGRAM(s) Oral every 8 hours    MEDICATIONS  (PRN):  acetaminophen     Tablet .. 650 milliGRAM(s) Oral every 6 hours PRN Temp greater or equal to 38C (100.4F), Mild Pain (1 - 3)  aluminum hydroxide/magnesium hydroxide/simethicone Suspension 30 milliLiter(s) Oral every 4 hours PRN Dyspepsia  benzonatate 100 milliGRAM(s) Oral every 8 hours PRN Cough  dextrose Oral Gel 15 Gram(s) Oral once PRN Blood Glucose LESS THAN 70 milliGRAM(s)/deciliter  guaifenesin/dextromethorphan Oral Liquid 10 milliLiter(s) Oral every 4 hours PRN Cough  HYDROmorphone  Injectable 2 milliGRAM(s) IV Push every 3 hours PRN Severe Pain (7 - 10)  HYDROmorphone  Injectable 1 milliGRAM(s) IV Push every 3 hours PRN Moderate Pain (4 - 6)  HYDROmorphone  Injectable 0.2 milliGRAM(s) IV Push every 3 hours PRN break through pain  melatonin 3 milliGRAM(s) Oral at bedtime PRN Insomnia  ondansetron Injectable 4 milliGRAM(s) IV Push every 8 hours PRN Nausea and/or Vomiting      I&O's Summary    06 Jan 2025 07:01  -  07 Jan 2025 07:00  --------------------------------------------------------  IN: 1373 mL / OUT: 0 mL / NET: 1373 mL        acetaminophen     Tablet .. 650 milliGRAM(s) Oral every 6 hours PRN  acetylcysteine 10%  Inhalation 4 milliLiter(s) Inhalation every 6 hours  aluminum hydroxide/magnesium hydroxide/simethicone Suspension 30 milliLiter(s) Oral every 4 hours PRN  benzonatate 100 milliGRAM(s) Oral every 8 hours PRN  chlorhexidine 2% Cloths 1 Application(s) Topical daily  dextrose 5%. 1000 milliLiter(s) IV Continuous <Continuous>  dextrose 5%. 1000 milliLiter(s) IV Continuous <Continuous>  dextrose 50% Injectable 25 Gram(s) IV Push once  dextrose 50% Injectable 12.5 Gram(s) IV Push once  dextrose 50% Injectable 25 Gram(s) IV Push once  dextrose Oral Gel 15 Gram(s) Oral once PRN  glucagon  Injectable 1 milliGRAM(s) IntraMuscular once  guaifenesin/dextromethorphan Oral Liquid 10 milliLiter(s) Oral every 4 hours PRN  HYDROmorphone  Injectable 2 milliGRAM(s) IV Push every 3 hours PRN  HYDROmorphone  Injectable 1 milliGRAM(s) IV Push every 3 hours PRN  HYDROmorphone  Injectable 0.2 milliGRAM(s) IV Push every 3 hours PRN  insulin lispro (ADMELOG) corrective regimen sliding scale   SubCutaneous three times a day before meals  levalbuterol Inhalation 1.25 milliGRAM(s) Inhalation every 6 hours  melatonin 3 milliGRAM(s) Oral at bedtime PRN  methylPREDNISolone sodium succinate Injectable 40 milliGRAM(s) IV Push every 8 hours  nafcillin  IVPB 2 Gram(s) IV Intermittent every 4 hours  octreotide  Injectable 100 MICROGram(s) SubCutaneous two times a day  ondansetron Injectable 4 milliGRAM(s) IV Push every 8 hours PRN  oseltamivir 75 milliGRAM(s) Oral two times a day  oseltamivir 75 milliGRAM(s) Oral two times a day  pantoprazole  Injectable 40 milliGRAM(s) IV Push every 12 hours  pregabalin 200 milliGRAM(s) Oral every 8 hours      PHYSICAL EXAM:  Vital Signs Last 24 Hrs  T(C): 36.8 (07 Jan 2025 07:57), Max: 37.2 (06 Jan 2025 15:00)  T(F): 98.2 (07 Jan 2025 07:57), Max: 99 (07 Jan 2025 02:00)  HR: 102 (07 Jan 2025 10:16) (89 - 133)  BP: 109/65 (07 Jan 2025 10:16) (93/57 - 115/82)  BP(mean): 78 (07 Jan 2025 10:16) (64 - 92)  RR: 16 (07 Jan 2025 10:16) (15 - 17)  SpO2: 98% (07 Jan 2025 10:16) (96% - 100%)    Parameters below as of 07 Jan 2025 10:16  Patient On (Oxygen Delivery Method): nasal cannula, high flow          CONSTITUTIONAL: mild distress  EYES: PERRLA  ENMT: Oral mucosa with moist membranes  RESP: mild distress, diffuse rhonchi. bibasilar crackles appreciated  CV: RRR, +S1S2, no peripheral edema  GI: Soft, NT, ND  PSYCH: A+O x 3, mood and affect appropriate  NEURO: Cooperative, upper and lower motor function grossly intact bilaterally, sensation grossly intact throughout      LABS:                        7.1    6.03  )-----------( 70       ( 07 Jan 2025 06:15 )             22.1     01-07    139  |  106  |  17.5  ----------------------------<  394[H]  3.3[L]   |  20.0[L]  |  0.61    Ca    7.2[L]      07 Jan 2025 06:15  Mg     2.3     01-07    TPro  5.5[L]  /  Alb  2.6[L]  /  TBili  3.1[H]  /  DBili  2.6[H]  /  AST  16  /  ALT  22  /  AlkPhos  228[H]  01-07          Urinalysis Basic - ( 07 Jan 2025 06:15 )    Color: x / Appearance: x / SG: x / pH: x  Gluc: 394 mg/dL / Ketone: x  / Bili: x / Urobili: x   Blood: x / Protein: x / Nitrite: x   Leuk Esterase: x / RBC: x / WBC x   Sq Epi: x / Non Sq Epi: x / Bacteria: x        Culture - Sputum (collected 06 Jan 2025 01:34)  Source: .Sputum Sputum  Gram Stain (06 Jan 2025 13:49):    Moderate Squamous epithelial cells seen per low power field    Moderate polymorphonuclear leukocytes seen per low power field    Few Gram Negative Rods seen per oil power field    Few Gram positive cocci in pairs seen per oil power field    Rare Gram Positive Rods seen per oil power field    Rare Yeast like cells seen per oil power field    Culture - Urine (collected 05 Jan 2025 12:34)  Source: Clean Catch Clean Catch (Midstream)  Final Report (06 Jan 2025 16:49):    10,000 - 49,000 CFU/mL Escherichia coli    <10,000 CFU/mL Normal Urogenital Stacie  Organism: Escherichia coli (07 Jan 2025 10:05)  Organism: Escherichia coli (07 Jan 2025 10:05)    Culture - Blood (collected 05 Jan 2025 10:54)  Source: .Blood BLOOD  Gram Stain (07 Jan 2025 09:41):    Growth in anaerobic bottle: Gram Positive Cocci in Clusters    Growth in aerobic bottle: Gram Positive Cocci in Clusters  Preliminary Report (07 Jan 2025 09:42):    Growth in anaerobic bottle: Gram Positive Cocci in Clusters    Growth in anaerobic bottle: Gram Positive Cocci in Clusters    Growth in aerobic bottle: Gram Positive Cocci in Clusters    Direct identification is available within approximately 3-5    hours either by Blood Panel Multiplexed PCR or Direct    MALDI-TOF. Details: [email or web address]  Organism: Blood Culture PCR (06 Jan 2025 11:35)  Organism: Blood Culture PCR (06 Jan 2025 11:35)      CAPILLARY BLOOD GLUCOSE      POCT Blood Glucose.: 390 mg/dL (07 Jan 2025 07:45)  POCT Blood Glucose.: 412 mg/dL (07 Jan 2025 05:32)      IMAGING:

## 2025-01-07 NOTE — CONSULT NOTE ADULT - ASSESSMENT
39y  Female with h/o metastatic breast cancer ER/[place] Neg, HER-2 + on chemotherapy (last dose 12/26/24) (mets to lung, liver, spleen, spine, bone and brain), gastritis w/ intermittent episodes of dark stool (follows w/ Dr. Rosado GI and is on PPI and octreotide daily). Patient presented 1/5 with c/o worsening SOB.  She was seen in ED 1/2/25 for for weakness and SOB at which time she was found to have Hb of 5 requiring PRBC transfusion and positive for Flu A and started on tamiflu and was discharged from the ED on 1/3/25.  Her respiratory status has worsened since then w/ productive cough associated with body aches and chills.  Denies sick contacts but has had many frequent hospital visits. Patient has been afebrile, no leukocytosis. Currently on HFNC for worsening respiratory status. continued on Tamiflu. Vancomycn and Zosyn was added. Blood cultures with MSSA.  Cardiology consultation requested for evaluation of endocarditis.       Patient with persistent bacteremia in the setting of chemoport   Clear indication for MARYANN, however at present time patient has respiratory failure on HFNC, Once patient improve from respiratory status will gladly proceed with MARYANN   Will continue to follow and plan accordantly  Rest as per primary team and other consultants.

## 2025-01-07 NOTE — PROGRESS NOTE ADULT - ASSESSMENT
39y  Female with h/o metastatic breast cancer ER/[place] Neg, HER-2 + on chemotherapy (last dose 12/26/24) (mets to lung, liver, spleen, spine, bone and brain), gastritis w/ intermittent episodes of dark stool (follows w/ Dr. Rosado GI and is on PPI and octreotide daily). Patient presented 1/5 with c/o worsening SOB.  She was seen in ED 1/2/25 for for weakness and SOB at which time she was found to have Hb of 5 requiring PRBC transfusion and positive for Flu A and started on tamiflu and was discharged from the ED on 1/3/25.  Her respiratory status has worsened since then w/ productive cough associated with body aches and chills.  Denies sick contacts but has had many frequent hospital visits. Patient has been afebrile, no leukocytosis. Was placed on BIPAP for Worsening respiratory status. continued on Tamiflu. Vancomycn and Zosyn was added. Blood cultures with MSSA. ID input requested.       MSSA bacteremia   Staphylococcus aureus PNA   Influenza A   metastatic breast cancer ER/MS Neg, HER-2 + on chemotherapy   Port in place     - Blood cultures 1/5 reporting MSSA   - Repeat blood cultures ordered   - Sputum Cx 1/6 Staphylococcus aureus  - Urine Cx 1/5 reporting 10k - 49k Escherichia coli  of ? significance  since UA not concerning for UTI   - RVP/COVID 19 PCR + Influenza A  - CTA Chest reporting No acute pulmonary embolism. Wide spread patchy airspace opacities bilaterally, increased since the prior exam.  - US RUQ reporting No evidence of acute cholecystitis or biliary ductal dilatation.  - Procalcitonin level 2.32  - TTE 1/6 with no veg  - Called cardiology consult for MARYANN  - Continue oseltamivir 75mg [id number] till 1/10/25  - Continue Nafcillin 2gm IV [id number]  - Hold off on PICC/Midline for now unless needed for reasons other than infectious diseases  - Follow up cultures  - Trend Fever  - Trend WBC      Thank you for allowing me to participate in the care of your patient.   Will Follow    Discussed treatment plan with: Cardiology. Dr Bryant/Jumana NP and Clinical pharmacy

## 2025-01-08 LAB
-  CEFTAROLINE: SIGNIFICANT CHANGE UP
-  CLINDAMYCIN: SIGNIFICANT CHANGE UP
-  CLINDAMYCIN: SIGNIFICANT CHANGE UP
-  ERYTHROMYCIN: SIGNIFICANT CHANGE UP
-  ERYTHROMYCIN: SIGNIFICANT CHANGE UP
-  GENTAMICIN: SIGNIFICANT CHANGE UP
-  GENTAMICIN: SIGNIFICANT CHANGE UP
-  OXACILLIN: SIGNIFICANT CHANGE UP
-  OXACILLIN: SIGNIFICANT CHANGE UP
-  PENICILLIN: SIGNIFICANT CHANGE UP
-  PENICILLIN: SIGNIFICANT CHANGE UP
-  RIFAMPIN: SIGNIFICANT CHANGE UP
-  RIFAMPIN: SIGNIFICANT CHANGE UP
-  TETRACYCLINE: SIGNIFICANT CHANGE UP
-  TETRACYCLINE: SIGNIFICANT CHANGE UP
-  TRIMETHOPRIM/SULFAMETHOXAZOLE: SIGNIFICANT CHANGE UP
-  TRIMETHOPRIM/SULFAMETHOXAZOLE: SIGNIFICANT CHANGE UP
-  VANCOMYCIN: SIGNIFICANT CHANGE UP
-  VANCOMYCIN: SIGNIFICANT CHANGE UP
A1C WITH ESTIMATED AVERAGE GLUCOSE RESULT: 5.6 % — SIGNIFICANT CHANGE UP (ref 4–5.6)
ALBUMIN SERPL ELPH-MCNC: 2.5 G/DL — LOW (ref 3.3–5.2)
ALP SERPL-CCNC: 240 U/L — HIGH (ref 40–120)
ALT FLD-CCNC: 25 U/L — SIGNIFICANT CHANGE UP
ANION GAP SERPL CALC-SCNC: 11 MMOL/L — SIGNIFICANT CHANGE UP (ref 5–17)
AST SERPL-CCNC: 24 U/L — SIGNIFICANT CHANGE UP
BILIRUB DIRECT SERPL-MCNC: 1.3 MG/DL — HIGH (ref 0–0.3)
BILIRUB INDIRECT FLD-MCNC: 0.6 MG/DL — SIGNIFICANT CHANGE UP (ref 0.2–1)
BILIRUB SERPL-MCNC: 1.9 MG/DL — SIGNIFICANT CHANGE UP (ref 0.4–2)
BUN SERPL-MCNC: 17.8 MG/DL — SIGNIFICANT CHANGE UP (ref 8–20)
CALCIUM SERPL-MCNC: 6.8 MG/DL — LOW (ref 8.4–10.5)
CHLORIDE SERPL-SCNC: 103 MMOL/L — SIGNIFICANT CHANGE UP (ref 96–108)
CO2 SERPL-SCNC: 27 MMOL/L — SIGNIFICANT CHANGE UP (ref 22–29)
CREAT SERPL-MCNC: 0.56 MG/DL — SIGNIFICANT CHANGE UP (ref 0.5–1.3)
CULTURE RESULTS: ABNORMAL
CULTURE RESULTS: ABNORMAL
EGFR: 120 ML/MIN/1.73M2 — SIGNIFICANT CHANGE UP
ESTIMATED AVERAGE GLUCOSE: 114 MG/DL — SIGNIFICANT CHANGE UP (ref 68–114)
GLUCOSE BLDC GLUCOMTR-MCNC: 140 MG/DL — HIGH (ref 70–99)
GLUCOSE BLDC GLUCOMTR-MCNC: 150 MG/DL — HIGH (ref 70–99)
GLUCOSE BLDC GLUCOMTR-MCNC: 259 MG/DL — HIGH (ref 70–99)
GLUCOSE SERPL-MCNC: 112 MG/DL — HIGH (ref 70–99)
GRAM STN FLD: ABNORMAL
HCT VFR BLD CALC: 22 % — LOW (ref 34.5–45)
HGB BLD-MCNC: 6.9 G/DL — CRITICAL LOW (ref 11.5–15.5)
MAGNESIUM SERPL-MCNC: 1.9 MG/DL — SIGNIFICANT CHANGE UP (ref 1.6–2.6)
MCHC RBC-ENTMCNC: 29.1 PG — SIGNIFICANT CHANGE UP (ref 27–34)
MCHC RBC-ENTMCNC: 31.4 G/DL — LOW (ref 32–36)
MCV RBC AUTO: 92.8 FL — SIGNIFICANT CHANGE UP (ref 80–100)
METHOD TYPE: SIGNIFICANT CHANGE UP
METHOD TYPE: SIGNIFICANT CHANGE UP
NRBC # BLD: 3 /100 WBCS — HIGH (ref 0–0)
NRBC BLD-RTO: 3 /100 WBCS — HIGH (ref 0–0)
ORGANISM # SPEC MICROSCOPIC CNT: ABNORMAL
ORGANISM # SPEC MICROSCOPIC CNT: SIGNIFICANT CHANGE UP
ORGANISM # SPEC MICROSCOPIC CNT: SIGNIFICANT CHANGE UP
PLATELET # BLD AUTO: 102 K/UL — LOW (ref 150–400)
POTASSIUM SERPL-MCNC: 3.5 MMOL/L — SIGNIFICANT CHANGE UP (ref 3.5–5.3)
POTASSIUM SERPL-SCNC: 3.5 MMOL/L — SIGNIFICANT CHANGE UP (ref 3.5–5.3)
PROCALCITONIN SERPL-MCNC: 1.37 NG/ML — HIGH (ref 0.02–0.1)
PROT SERPL-MCNC: 5.4 G/DL — LOW (ref 6.6–8.7)
RBC # BLD: 2.37 M/UL — LOW (ref 3.8–5.2)
RBC # FLD: 24.8 % — HIGH (ref 10.3–14.5)
SODIUM SERPL-SCNC: 140 MMOL/L — SIGNIFICANT CHANGE UP (ref 135–145)
SPECIMEN SOURCE: SIGNIFICANT CHANGE UP
WBC # BLD: 11.64 K/UL — HIGH (ref 3.8–10.5)
WBC # FLD AUTO: 11.64 K/UL — HIGH (ref 3.8–10.5)

## 2025-01-08 PROCEDURE — 99233 SBSQ HOSP IP/OBS HIGH 50: CPT

## 2025-01-08 PROCEDURE — G0545: CPT

## 2025-01-08 PROCEDURE — 93970 EXTREMITY STUDY: CPT | Mod: 26

## 2025-01-08 PROCEDURE — 99232 SBSQ HOSP IP/OBS MODERATE 35: CPT

## 2025-01-08 PROCEDURE — 99233 SBSQ HOSP IP/OBS HIGH 50: CPT | Mod: GC

## 2025-01-08 PROCEDURE — 71250 CT THORAX DX C-: CPT | Mod: 26

## 2025-01-08 RX ORDER — HYDROCORTISONE 1 %
1 CREAM (GRAM) TOPICAL
Refills: 0 | Status: COMPLETED | OUTPATIENT
Start: 2025-01-08 | End: 2025-01-09

## 2025-01-08 RX ADMIN — NAFCILLIN INJECTION 200 GRAM(S): 2 POWDER, FOR SOLUTION INTRAMUSCULAR; INTRAMUSCULAR; INTRAVENOUS at 23:10

## 2025-01-08 RX ADMIN — NAFCILLIN INJECTION 200 GRAM(S): 2 POWDER, FOR SOLUTION INTRAMUSCULAR; INTRAMUSCULAR; INTRAVENOUS at 18:36

## 2025-01-08 RX ADMIN — NAFCILLIN INJECTION 200 GRAM(S): 2 POWDER, FOR SOLUTION INTRAMUSCULAR; INTRAMUSCULAR; INTRAVENOUS at 15:30

## 2025-01-08 RX ADMIN — OCTREOTIDE ACETATE 100 MICROGRAM(S): 1000 INJECTION INTRAVENOUS; SUBCUTANEOUS at 18:32

## 2025-01-08 RX ADMIN — HYDROMORPHONE HYDROCHLORIDE 30 MILLILITER(S): 4 INJECTION, SOLUTION INTRAMUSCULAR; INTRAVENOUS; SUBCUTANEOUS at 19:24

## 2025-01-08 RX ADMIN — Medication 40 MILLIGRAM(S): at 10:28

## 2025-01-08 RX ADMIN — OSELTAMIVIR PHOSPHATE 75 MILLIGRAM(S): 75 CAPSULE ORAL at 18:32

## 2025-01-08 RX ADMIN — PANTOPRAZOLE 40 MILLIGRAM(S): 20 TABLET, DELAYED RELEASE ORAL at 06:15

## 2025-01-08 RX ADMIN — OSELTAMIVIR PHOSPHATE 75 MILLIGRAM(S): 75 CAPSULE ORAL at 12:55

## 2025-01-08 RX ADMIN — NAFCILLIN INJECTION 200 GRAM(S): 2 POWDER, FOR SOLUTION INTRAMUSCULAR; INTRAMUSCULAR; INTRAVENOUS at 03:31

## 2025-01-08 RX ADMIN — Medication 1.25 MILLIGRAM(S): at 21:43

## 2025-01-08 RX ADMIN — Medication 40 MILLIGRAM(S): at 06:15

## 2025-01-08 RX ADMIN — PREGABALIN CAPSULES, CV 200 MILLIGRAM(S): 225 CAPSULE ORAL at 14:29

## 2025-01-08 RX ADMIN — Medication 4 MILLILITER(S): at 16:04

## 2025-01-08 RX ADMIN — HYDROMORPHONE HYDROCHLORIDE 30 MILLILITER(S): 4 INJECTION, SOLUTION INTRAMUSCULAR; INTRAVENOUS; SUBCUTANEOUS at 07:33

## 2025-01-08 RX ADMIN — Medication 4 MILLILITER(S): at 04:16

## 2025-01-08 RX ADMIN — METHADONE HYDROCHLORIDE 5 MILLIGRAM(S): 5 SOLUTION ORAL at 14:29

## 2025-01-08 RX ADMIN — OSELTAMIVIR PHOSPHATE 75 MILLIGRAM(S): 75 CAPSULE ORAL at 06:15

## 2025-01-08 RX ADMIN — PREGABALIN CAPSULES, CV 200 MILLIGRAM(S): 225 CAPSULE ORAL at 06:15

## 2025-01-08 RX ADMIN — HYDROMORPHONE HYDROCHLORIDE 30 MILLILITER(S): 4 INJECTION, SOLUTION INTRAMUSCULAR; INTRAVENOUS; SUBCUTANEOUS at 14:21

## 2025-01-08 RX ADMIN — Medication 40 MILLIGRAM(S): at 14:28

## 2025-01-08 RX ADMIN — PREGABALIN CAPSULES, CV 200 MILLIGRAM(S): 225 CAPSULE ORAL at 22:14

## 2025-01-08 RX ADMIN — Medication 1 APPLICATION(S): at 18:32

## 2025-01-08 RX ADMIN — PANTOPRAZOLE 40 MILLIGRAM(S): 20 TABLET, DELAYED RELEASE ORAL at 18:32

## 2025-01-08 RX ADMIN — NAFCILLIN INJECTION 200 GRAM(S): 2 POWDER, FOR SOLUTION INTRAMUSCULAR; INTRAMUSCULAR; INTRAVENOUS at 06:17

## 2025-01-08 RX ADMIN — Medication 1.25 MILLIGRAM(S): at 16:04

## 2025-01-08 RX ADMIN — DEXTROMETHORPHAN HBR AND GUAIFENESIN ORAL SOLUTION 10 MILLILITER(S): 10; 100 LIQUID ORAL at 19:57

## 2025-01-08 RX ADMIN — Medication 4 MILLILITER(S): at 21:43

## 2025-01-08 RX ADMIN — Medication 1.25 MILLIGRAM(S): at 04:15

## 2025-01-08 RX ADMIN — NAFCILLIN INJECTION 200 GRAM(S): 2 POWDER, FOR SOLUTION INTRAMUSCULAR; INTRAMUSCULAR; INTRAVENOUS at 11:43

## 2025-01-08 RX ADMIN — METHADONE HYDROCHLORIDE 10 MILLIGRAM(S): 5 SOLUTION ORAL at 22:14

## 2025-01-08 RX ADMIN — Medication 40 MILLIGRAM(S): at 22:12

## 2025-01-08 RX ADMIN — Medication 4 MILLILITER(S): at 10:18

## 2025-01-08 RX ADMIN — Medication 3: at 12:58

## 2025-01-08 RX ADMIN — Medication 1.25 MILLIGRAM(S): at 10:17

## 2025-01-08 RX ADMIN — OCTREOTIDE ACETATE 100 MICROGRAM(S): 1000 INJECTION INTRAVENOUS; SUBCUTANEOUS at 09:53

## 2025-01-08 RX ADMIN — ONDANSETRON 4 MILLIGRAM(S): 4 TABLET, ORALLY DISINTEGRATING ORAL at 03:16

## 2025-01-08 RX ADMIN — ANTISEPTIC SURGICAL SCRUB 1 APPLICATION(S): 0.04 SOLUTION TOPICAL at 14:20

## 2025-01-08 NOTE — PROGRESS NOTE ADULT - ASSESSMENT
39y  Female with h/o metastatic breast cancer ER/[place] Neg, HER-2 + on chemotherapy (last dose 12/26/24) (mets to lung, liver, spleen, spine, bone and brain), gastritis w/ intermittent episodes of dark stool (follows w/ Dr. Rosado GI and is on PPI and octreotide daily). Patient presented 1/5 with c/o worsening SOB.  She was seen in ED 1/2/25 for for weakness and SOB at which time she was found to have Hb of 5 requiring PRBC transfusion and positive for Flu A and started on tamiflu and was discharged from the ED on 1/3/25.  Her respiratory status has worsened since then w/ productive cough associated with body aches and chills.  Denies sick contacts but has had many frequent hospital visits. Patient has been afebrile, no leukocytosis. Was placed on BIPAP for Worsening respiratory status. continued on Tamiflu. Vancomycn and Zosyn was added. Blood cultures with MSSA.     Metastatic breast cancer - last dose of trastuzumab was on 12/26/24.  All treatment on hold at the moment.  Follow up with primary oncologist, Dr. Chapa.    Cytopenias - secondary to malignancy and acute illness. Platelet count improving, today 102k.    Drop was also likely due to acute illness.  Hgb 6.9. Possible myelosuppression from sepsis/acute illness. No bleeding reported.  Transfuse if hgb<7.0. Agree with getting 1 unit today.    Respiratory failure - multifocal pneumonia and flu+  On nafcillin, O2 titrated down to 4L O2 NC.   On oseltamivir.  Management as per primary team.  MARYANN being considered to rule out endocarditis.  ID on board.  Cont aggressive pain management. Currently on PCA pump.    thank you

## 2025-01-08 NOTE — CONSULT NOTE ADULT - ASSESSMENT
38 y/o F w/ PMH of metastatic breast cancer ER/[place] Neg, HER-2 pos on chemotherapy (last dose 12/26/24) (mets to lung, liver, spleen, spine, bone and brain), gastritis admitted for acute hypoxic respiratory failure 2/2 to influenza A with superimposed bacterial PNA, hospital course c/b bacteremia     Pulmonology consulted for acute worsening of respiratory status.     Problem List    #AHRF 2/2 Influenza A with superimposed bacterial PNA  #bacteremia   #Metastatic breast cancer  #pancytopenia  #transaminitis     -Maintain tele/  - CTA w/ PE protocol negative for acute PE but + for multifocal PNA vs pulm edema with metastatic dz  - Repeat CT performed 01/08 - w/ progression of groundglass opacities  - Blood Cx + for Gram + cocci  - No significant interval changes on repeat echo  - would continue diuresis  - cw nafcillin and oseltamivir  - cw methylprednisolone  - ABG without signs of hypercapnia  - Procal elevate  - trend lactate  - Nocturnal NIIV  - PRN NC, duonebs q6h   - Maintain low threshold to reconsult MICU should patient's clinical condition worsen further    Rest of care per primary team

## 2025-01-08 NOTE — PROGRESS NOTE ADULT - SUBJECTIVE AND OBJECTIVE BOX
[facility] Division of Medicine    SUBJECTIVE / OVERNIGHT EVENTS: No overnight events as per RN. Pt seen at the bedside. Endorses feeling better. Still having pressure over her chest but improved overall. Now on NC and comfortable. Denies any new complaints. All other systems reviewed and are negative.    MEDICATIONS  (STANDING):  acetylcysteine 10%  Inhalation 4 milliLiter(s) Inhalation every 6 hours  chlorhexidine 2% Cloths 1 Application(s) Topical daily  dextrose 5%. 1000 milliLiter(s) (50 mL/Hr) IV Continuous <Continuous>  dextrose 5%. 1000 milliLiter(s) (100 mL/Hr) IV Continuous <Continuous>  dextrose 50% Injectable 25 Gram(s) IV Push once  dextrose 50% Injectable 12.5 Gram(s) IV Push once  dextrose 50% Injectable 25 Gram(s) IV Push once  glucagon  Injectable 1 milliGRAM(s) IntraMuscular once  hydrocortisone 1% Cream 1 Application(s) Topical two times a day  HYDROmorphone PCA (1 mG/mL) 30 milliLiter(s) PCA Continuous PCA Continuous  insulin lispro (ADMELOG) corrective regimen sliding scale   SubCutaneous three times a day before meals  levalbuterol Inhalation 1.25 milliGRAM(s) Inhalation every 6 hours  methadone    Tablet 10 milliGRAM(s) Oral at bedtime  methadone    Tablet 5 milliGRAM(s) Oral <User Schedule>  methylPREDNISolone sodium succinate Injectable 40 milliGRAM(s) IV Push every 8 hours  nafcillin  IVPB 2 Gram(s) IV Intermittent every 4 hours  octreotide  Injectable 100 MICROGram(s) SubCutaneous two times a day  oseltamivir 75 milliGRAM(s) Oral two times a day  oseltamivir 75 milliGRAM(s) Oral two times a day  pantoprazole  Injectable 40 milliGRAM(s) IV Push every 12 hours  pregabalin 200 milliGRAM(s) Oral every 8 hours    MEDICATIONS  (PRN):  acetaminophen     Tablet .. 650 milliGRAM(s) Oral every 6 hours PRN Temp greater or equal to 38C (100.4F), Mild Pain (1 - 3)  aluminum hydroxide/magnesium hydroxide/simethicone Suspension 30 milliLiter(s) Oral every 4 hours PRN Dyspepsia  benzonatate 100 milliGRAM(s) Oral every 8 hours PRN Cough  dextrose Oral Gel 15 Gram(s) Oral once PRN Blood Glucose LESS THAN 70 milliGRAM(s)/deciliter  guaifenesin/dextromethorphan Oral Liquid 10 milliLiter(s) Oral every 4 hours PRN Cough  melatonin 3 milliGRAM(s) Oral at bedtime PRN Insomnia  naloxone Injectable 0.1 milliGRAM(s) IV Push every 3 minutes PRN For ANY of the following changes in patient status:  A. RR LESS THAN 10 breaths per minute, B. Oxygen saturation LESS THAN 90%, C. Sedation score of 6  ondansetron Injectable 4 milliGRAM(s) IV Push every 8 hours PRN Nausea and/or Vomiting      I&O's Summary    07 Jan 2025 07:01  -  08 Jan 2025 07:00  --------------------------------------------------------  IN: 300 mL / OUT: 0 mL / NET: 300 mL    08 Jan 2025 07:01  -  08 Jan 2025 12:08  --------------------------------------------------------  IN: 0 mL / OUT: 400 mL / NET: -400 mL        acetaminophen     Tablet .. 650 milliGRAM(s) Oral every 6 hours PRN  acetylcysteine 10%  Inhalation 4 milliLiter(s) Inhalation every 6 hours  aluminum hydroxide/magnesium hydroxide/simethicone Suspension 30 milliLiter(s) Oral every 4 hours PRN  benzonatate 100 milliGRAM(s) Oral every 8 hours PRN  chlorhexidine 2% Cloths 1 Application(s) Topical daily  dextrose 5%. 1000 milliLiter(s) IV Continuous <Continuous>  dextrose 5%. 1000 milliLiter(s) IV Continuous <Continuous>  dextrose 50% Injectable 25 Gram(s) IV Push once  dextrose 50% Injectable 12.5 Gram(s) IV Push once  dextrose 50% Injectable 25 Gram(s) IV Push once  dextrose Oral Gel 15 Gram(s) Oral once PRN  glucagon  Injectable 1 milliGRAM(s) IntraMuscular once  guaifenesin/dextromethorphan Oral Liquid 10 milliLiter(s) Oral every 4 hours PRN  hydrocortisone 1% Cream 1 Application(s) Topical two times a day  HYDROmorphone PCA (1 mG/mL) 30 milliLiter(s) PCA Continuous PCA Continuous  insulin lispro (ADMELOG) corrective regimen sliding scale   SubCutaneous three times a day before meals  levalbuterol Inhalation 1.25 milliGRAM(s) Inhalation every 6 hours  melatonin 3 milliGRAM(s) Oral at bedtime PRN  methadone    Tablet 10 milliGRAM(s) Oral at bedtime  methadone    Tablet 5 milliGRAM(s) Oral <User Schedule>  methylPREDNISolone sodium succinate Injectable 40 milliGRAM(s) IV Push every 8 hours  nafcillin  IVPB 2 Gram(s) IV Intermittent every 4 hours  naloxone Injectable 0.1 milliGRAM(s) IV Push every 3 minutes PRN  octreotide  Injectable 100 MICROGram(s) SubCutaneous two times a day  ondansetron Injectable 4 milliGRAM(s) IV Push every 8 hours PRN  oseltamivir 75 milliGRAM(s) Oral two times a day  oseltamivir 75 milliGRAM(s) Oral two times a day  pantoprazole  Injectable 40 milliGRAM(s) IV Push every 12 hours  pregabalin 200 milliGRAM(s) Oral every 8 hours      PHYSICAL EXAM:  Vital Signs Last 24 Hrs  T(C): 36.4 (08 Jan 2025 11:01), Max: 37.1 (08 Jan 2025 08:27)  T(F): 97.5 (08 Jan 2025 11:01), Max: 98.7 (08 Jan 2025 08:27)  HR: 100 (08 Jan 2025 11:01) (77 - 109)  BP: 131/80 (08 Jan 2025 11:01) (104/63 - 131/80)  BP(mean): 94 (08 Jan 2025 11:01) (75 - 96)  RR: 18 (08 Jan 2025 11:01) (14 - 25)  SpO2: 100% (08 Jan 2025 11:01) (92% - 100%)    Parameters below as of 08 Jan 2025 10:28  Patient On (Oxygen Delivery Method): nasal cannula  O2 Flow (L/min): 4        CONSTITUTIONAL: no apparent distress  RESP: No respiratory distress, scattered crackles/rhonchi  CV: RRR, +S1S2, no peripheral edema  GI: Soft, NT, ND  PSYCH: A+O x 3, mood and affect appropriate  NEURO: Cooperative, upper and lower motor function grossly intact bilaterally, sensation grossly intact throughout      LABS:                        6.9    11.64 )-----------( 102      ( 08 Jan 2025 06:20 )             22.0     01-08    140  |  103  |  17.8  ----------------------------<  112[H]  3.5   |  27.0  |  0.56    Ca    6.8[L]      08 Jan 2025 06:20  Mg     1.9     01-08    TPro  5.4[L]  /  Alb  2.5[L]  /  TBili  1.9  /  DBili  1.3[H]  /  AST  24  /  ALT  25  /  AlkPhos  240[H]  01-08          Urinalysis Basic - ( 08 Jan 2025 06:20 )    Color: x / Appearance: x / SG: x / pH: x  Gluc: 112 mg/dL / Ketone: x  / Bili: x / Urobili: x   Blood: x / Protein: x / Nitrite: x   Leuk Esterase: x / RBC: x / WBC x   Sq Epi: x / Non Sq Epi: x / Bacteria: x        Culture - Blood (collected 07 Jan 2025 06:15)  Source: .Blood BLOOD  Gram Stain (08 Jan 2025 08:21):    Growth in aerobic bottle: Gram Positive Cocci in Clusters  Preliminary Report (08 Jan 2025 08:22):    Growth in aerobic bottle: Gram Positive Cocci in Clusters    Culture - Sputum (collected 06 Jan 2025 01:34)  Source: .Sputum Sputum  Gram Stain (06 Jan 2025 13:49):    Moderate Squamous epithelial cells seen per low power field    Moderate polymorphonuclear leukocytes seen per low power field    Few Gram Negative Rods seen per oil power field    Few Gram positive cocci in pairs seen per oil power field    Rare Gram Positive Rods seen per oil power field    Rare Yeast like cells seen per oil power field  Final Report (08 Jan 2025 06:50):    Moderate Staphylococcus aureus    Commensal sylvester consistent with body site  Organism: Staphylococcus aureus (08 Jan 2025 06:50)  Organism: Staphylococcus aureus (08 Jan 2025 06:50)    Culture - Urine (collected 05 Jan 2025 12:34)  Source: Clean Catch Clean Catch (Midstream)  Final Report (06 Jan 2025 16:49):    10,000 - 49,000 CFU/mL Escherichia coli    <10,000 CFU/mL Normal Urogenital Sylvester  Organism: Escherichia coli (07 Jan 2025 10:05)  Organism: Escherichia coli (07 Jan 2025 10:05)      CAPILLARY BLOOD GLUCOSE      POCT Blood Glucose.: 140 mg/dL (08 Jan 2025 09:09)  POCT Blood Glucose.: 294 mg/dL (07 Jan 2025 17:04)  POCT Blood Glucose.: 389 mg/dL (07 Jan 2025 12:23)      IMAGING:

## 2025-01-08 NOTE — PROGRESS NOTE ADULT - SUBJECTIVE AND OBJECTIVE BOX
39y  Female with h/o metastatic breast cancer ER/TX Neg, HER-2 + on chemotherapy (last dose 12/26/24) (mets to lung, liver, spleen, spine, bone and brain), gastritis w/ intermittent episodes of dark stool (follows w/ Dr. Rosado GI and is on PPI and octreotide daily). Patient presented 1/5 with c/o worsening SOB.  She was seen in ED 1/2/25 for for weakness and SOB at which time she was found to have Hb of 5 requiring PRBC transfusion and positive for Flu A and started on tamiflu and was discharged from the ED on 1/3/25.  Her respiratory status has worsened since then w/ productive cough associated with body aches and chills.  Denies sick contacts but has had many frequent hospital visits. Patient has been afebrile, no leukocytosis. Was placed on BIPAP for Worsening respiratory status. continued on Tamiflu. Vancomycn and Zosyn was added. Blood cultures with MSSA.     1/8/25: She says that her pain has improved since being put on a PCA pump. However, she says that she has required less pain meds compared to yesterday and has noticed an improvement. She denies n/v/d.  Her glucose level was elevated at >200 which was surprising for her. She says that she is going to undergo a MARYANN possibly tomorrow to rule out endocarditis. Her breathing is better but says that she feels that she sleeps better with BIPAP at night. Hgb 6.9 this am. Getting transfused prbc.      MEDICATIONS  (STANDING):  acetylcysteine 10%  Inhalation 4 milliLiter(s) Inhalation every 6 hours  chlorhexidine 2% Cloths 1 Application(s) Topical daily  dextrose 5%. 1000 milliLiter(s) (50 mL/Hr) IV Continuous <Continuous>  dextrose 5%. 1000 milliLiter(s) (100 mL/Hr) IV Continuous <Continuous>  dextrose 50% Injectable 25 Gram(s) IV Push once  dextrose 50% Injectable 12.5 Gram(s) IV Push once  dextrose 50% Injectable 25 Gram(s) IV Push once  glucagon  Injectable 1 milliGRAM(s) IntraMuscular once  hydrocortisone 1% Cream 1 Application(s) Topical two times a day  HYDROmorphone PCA (1 mG/mL) 30 milliLiter(s) PCA Continuous PCA Continuous  insulin lispro (ADMELOG) corrective regimen sliding scale   SubCutaneous three times a day before meals  levalbuterol Inhalation 1.25 milliGRAM(s) Inhalation every 6 hours  methadone    Tablet 10 milliGRAM(s) Oral at bedtime  methadone    Tablet 5 milliGRAM(s) Oral <User Schedule>  methylPREDNISolone sodium succinate Injectable 40 milliGRAM(s) IV Push every 8 hours  nafcillin  IVPB 2 Gram(s) IV Intermittent every 4 hours  octreotide  Injectable 100 MICROGram(s) SubCutaneous two times a day  oseltamivir 75 milliGRAM(s) Oral two times a day  pantoprazole  Injectable 40 milliGRAM(s) IV Push every 12 hours  pregabalin 200 milliGRAM(s) Oral every 8 hours    MEDICATIONS  (PRN):  acetaminophen     Tablet .. 650 milliGRAM(s) Oral every 6 hours PRN Temp greater or equal to 38C (100.4F), Mild Pain (1 - 3)  aluminum hydroxide/magnesium hydroxide/simethicone Suspension 30 milliLiter(s) Oral every 4 hours PRN Dyspepsia  benzonatate 100 milliGRAM(s) Oral every 8 hours PRN Cough  dextrose Oral Gel 15 Gram(s) Oral once PRN Blood Glucose LESS THAN 70 milliGRAM(s)/deciliter  guaifenesin/dextromethorphan Oral Liquid 10 milliLiter(s) Oral every 4 hours PRN Cough  melatonin 3 milliGRAM(s) Oral at bedtime PRN Insomnia  naloxone Injectable 0.1 milliGRAM(s) IV Push every 3 minutes PRN For ANY of the following changes in patient status:  A. RR LESS THAN 10 breaths per minute, B. Oxygen saturation LESS THAN 90%, C. Sedation score of 6  ondansetron Injectable 4 milliGRAM(s) IV Push every 8 hours PRN Nausea and/or Vomiting    Vital Signs Last 24 Hrs  T(C): 36.4 (08 Jan 2025 11:01), Max: 37.1 (08 Jan 2025 08:27)  T(F): 97.5 (08 Jan 2025 11:01), Max: 98.7 (08 Jan 2025 08:27)  HR: 84 (08 Jan 2025 12:00) (77 - 109)  BP: 111/67 (08 Jan 2025 12:00) (104/63 - 131/80)  BP(mean): 82 (08 Jan 2025 12:00) (75 - 96)  RR: 18 (08 Jan 2025 11:01) (14 - 25)  SpO2: 100% (08 Jan 2025 12:00) (92% - 100%)    Parameters below as of 08 Jan 2025 12:00  Patient On (Oxygen Delivery Method): nasal cannula  O2 Flow (L/min): 4  PE:  NAD  On O2 NC  bilateral rales  abd soft, nd, nt  a/o x 3      Labs:                          6.9    11.64 )-----------( 102      ( 08 Jan 2025 06:20 )             22.0       CBC:            7.1    6.03  )-----------( 70       ( 01-07-25 @ 06:15 )             22.1       Chem:       01-08    140  |  103  |  17.8  ----------------------------<  112[H]  3.5   |  27.0  |  0.56    Ca    6.8[L]      08 Jan 2025 06:20  Mg     1.9     01-08    TPro  5.4[L]  /  Alb  2.5[L]  /  TBili  1.9  /  DBili  1.3[H]  /  AST  24  /  ALT  25  /  AlkPhos  240[H]  01-08          ( 01-07-25 @ 06:15 )    139  |  106  |  17.5  ----------------------------<  394[H]  3.3[L]   |  20.0[L]  |  0.61    Liver Functions: ( 01-07-25 @ 06:15 )  Alb: 2.6 g/dL / Pro: 5.5 g/dL / ALK PHOS: 228 U/L / ALT: 22 U/L / AST: 16 U/L / GGT: x            Allergies  pertuzumab (Other (Severe))  Perjeta (Other (Severe))       REVIEW OF SYSTEMS:  Limited, patient is on BIPAP  no complaints

## 2025-01-08 NOTE — PROGRESS NOTE ADULT - ASSESSMENT
38 y/o F w/ PMH of metastatic breast cancer ER/MI Neg, HER-2 pos on chemotherapy (mets to lung, liver, spleen, spine, bone and brain), ?GAVE related bleeding (follows w/ Dr. Rosado GI and is on PPI and octreotide daily) presented for worsening sob over the past few days.  Pt was seen in ED 1/2/25 for for weakness and sob at which time she was found to have Hb of 5 requiring prbc transfusion and positive for Flu A and started on tamiflu.  Pts respirostyr status has worsened since then w/ productive cough but is unable to desribe sputum.  Pt tachycardic, tachypneic w/ O2 at 90% ORA and placed on 2L NC w/ improved saturation.  Clinically toxic appearing speaking 2-3 word sentences w/ course scattered ronchi on exam and expiratory insp/exp scattered wheezing.   Initial w/u significant for leukopenia, thrombocytopenia, RVP +FlA, CTA chest negative for PE but found to have multifocal pna.   Recievied vanco/zosyn and 1550cc IVNS bolus in ED.  [facility] consulted and will admit for sepsis 2/2 multifocal PNA      Sepsis 2/2 Flu A w/ superimposed multifocal PNA  Acute hypoxic respiratory failure 2/2 Flu A w/ superimposed multifocal PNA   - now on NC, comfortable  - CTA 1/5 negative for PE but w/ pulmonary/alveolar edema and small loculated L-effusion, wide spread patchy b/l air space disease increased from prior image   - persistently positive RVP for flu A  - c/w tamiflu through 1/10  - pulm following   - c/w IV steroids  - c/w abx  - sputum clx growing moderate staph aureus   - repeat CT shows improved pleural effusion but worsening GGOs, unclear if infectious or malignant etiology   - TTE EF 65-70%, no WMA    MSSA bacteremia  - blood clx positive 1/5, repeat in process  - ID following  - c/w nafcillin     Chronic normocytic anemia 2/2 metastatic breast cancer on chemo and 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   Thrombocytopenia likely 2/2 sepsis   Breast cancer ER/MI Neg, HER-2 pos on chemotherapy w/ mets to lung, liver, spleen, spine, bone and brain  - Baseline Hb ranges 8-9    - s/p prbc transfusion on 1/2, hgb has been stable at 7.1 but today 6.9, no active bleeding  - transfuse 1U prbc today  - c/w home sub q octreotide and IV protonix  - Plts likely low from sepsis and no intervention required at this time  - Monitor CBC and transfuse for Hb<7 and plts<20K in setting of sepsis   - [facility] following  - monitor on tele and     Breast cancer ER/MI Neg, HER-2 pos on chemotherapy w/ mets to lung, liver, spleen, spine, bone and brain  - c/w pregabalin, methylphenidate (confirmed on ISTOP Reference #: [id number])  - Reports being on methadone for pain but last 30 day script on istop dispensed 09/19/24  - Is on po dilaudid 4mg prn w/ last script dispensed 12/04/24   - now on PCA pump  - pain management following, revert back to PO Dilaudid regimen on d/c     Transaminitis w/ cholestatic predominance   - ALP likely from mets to bone but follow GGT to confirm   - AST/ALT relatively unchanged from prior  - t bili better today  - Trend LFTs     VTE ppx: SCDs, avoid chemical VTE ppx given known brain mets, place on SCDs instead     Dispo: Acute. Anticipated LOS unclear

## 2025-01-08 NOTE — PROGRESS NOTE ADULT - ASSESSMENT
39y  Female with h/o metastatic breast cancer ER/OR Neg, HER-2 + on chemotherapy (last dose 12/26/24) (mets to lung, liver, spleen, spine, bone and brain), gastritis w/ intermittent episodes of dark stool (follows w/ Dr. Rosado GI and is on PPI and octreotide daily). Patient presented 1/5 with c/o worsening SOB.  She was seen in ED 1/2/25 for for weakness and SOB at which time she was found to have Hb of 5 requiring PRBC transfusion and positive for Flu A and started on tamiflu and was discharged from the ED on 1/3/25.  Her respiratory status has worsened since then w/ productive cough associated with body aches and chills.  Denies sick contacts but has had many frequent hospital visits. Patient has been afebrile, no leukocytosis. Was placed on BIPAP for Worsening respiratory status. continued on Tamiflu. Vancomycn and Zosyn was added. Blood cultures with MSSA. ID input requested.       MSSA bacteremia   Staphylococcus aureus PNA   Influenza A   metastatic breast cancer ER/OR Neg, HER-2 + on chemotherapy   Port in place     - Blood cultures 1/5, 1/7 reporting MSSA    - Repeat blood cultures ordered for q 48hours   - Sputum Cx 1/6 Staphylococcus aureus  - Urine Cx 1/5 reporting 10k - 49k Escherichia coli  of ? significance  since UA not concerning for UTI   - RVP/COVID 19 PCR + Influenza A  - CTA Chest reporting No acute pulmonary embolism. Wide spread patchy airspace opacities bilaterally, increased since the prior exam.  - US RUQ reporting No evidence of acute cholecystitis or biliary ductal dilatation.  - Procalcitonin level 2.32  - TTE 1/6 with no veg  - Called cardiology consult for MARYANN  - Continue oseltamivir 75mg [id number] till 1/10/25  - Continue Nafcillin 2gm IV q8thgol  - Hold off on PICC/Midline for now unless needed for reasons other than infectious diseases  - Follow up cultures  - Trend Fever  - Trend WBC      Thank you for allowing me to participate in the care of your patient.   Will Follow    Discussed treatment plan with: Cardiology. Dr Bryant/Jumana NP and Clinical pharmacy

## 2025-01-08 NOTE — PROGRESS NOTE ADULT - SUBJECTIVE AND OBJECTIVE BOX
NATIVIDAD MYERS  [id number]      Chief Complaint:  Bacteremia    Interval History:  Patient feeling better, now on NC.  Still with cough.  Denies CP, palps.            acetaminophen     Tablet .. 650 milliGRAM(s) Oral every 6 hours PRN  acetylcysteine 10%  Inhalation 4 milliLiter(s) Inhalation every 6 hours  aluminum hydroxide/magnesium hydroxide/simethicone Suspension 30 milliLiter(s) Oral every 4 hours PRN  benzonatate 100 milliGRAM(s) Oral every 8 hours PRN  chlorhexidine 2% Cloths 1 Application(s) Topical daily  dextrose 5%. 1000 milliLiter(s) IV Continuous <Continuous>  dextrose 5%. 1000 milliLiter(s) IV Continuous <Continuous>  dextrose 50% Injectable 25 Gram(s) IV Push once  dextrose 50% Injectable 12.5 Gram(s) IV Push once  dextrose 50% Injectable 25 Gram(s) IV Push once  dextrose Oral Gel 15 Gram(s) Oral once PRN  glucagon  Injectable 1 milliGRAM(s) IntraMuscular once  guaifenesin/dextromethorphan Oral Liquid 10 milliLiter(s) Oral every 4 hours PRN  hydrocortisone 1% Cream 1 Application(s) Topical two times a day  HYDROmorphone PCA (1 mG/mL) 30 milliLiter(s) PCA Continuous PCA Continuous  insulin lispro (ADMELOG) corrective regimen sliding scale   SubCutaneous three times a day before meals  levalbuterol Inhalation 1.25 milliGRAM(s) Inhalation every 6 hours  melatonin 3 milliGRAM(s) Oral at bedtime PRN  methadone    Tablet 10 milliGRAM(s) Oral at bedtime  methadone    Tablet 5 milliGRAM(s) Oral <User Schedule>  methylPREDNISolone sodium succinate Injectable 40 milliGRAM(s) IV Push every 8 hours  nafcillin  IVPB 2 Gram(s) IV Intermittent every 4 hours  naloxone Injectable 0.1 milliGRAM(s) IV Push every 3 minutes PRN  octreotide  Injectable 100 MICROGram(s) SubCutaneous two times a day  ondansetron Injectable 4 milliGRAM(s) IV Push every 8 hours PRN  oseltamivir 75 milliGRAM(s) Oral two times a day  pantoprazole  Injectable 40 milliGRAM(s) IV Push every 12 hours  pregabalin 200 milliGRAM(s) Oral every 8 hours          Physical Exam:  T(C): 36.4 (01-08-25 @ 11:01), Max: 37.1 (01-08-25 @ 08:27)  HR: 84 (01-08-25 @ 12:00) (77 - 109)  BP: 111/67 (01-08-25 @ 12:00) (104/63 - 131/80)  RR: 18 (01-08-25 @ 11:01) (14 - 25)  SpO2: 100% (01-08-25 @ 12:00) (92% - 100%)  General: Comfortable in NAD  Neck: No JVD  CVS: nl s1s2, no s3  Pulm: CTA b/l  Abd: soft, non-tender  Ext: No c/c/e  Neuro A&O x3  Psych: Normal affect      Labs:   08 Jan 2025 06:20    140    |  103    |  17.8   ----------------------------<  112    3.5     |  27.0   |  0.56     Ca    6.8        08 Jan 2025 06:20  Mg     1.9       08 Jan 2025 06:20    TPro  5.4    /  Alb  2.5    /  TBili  1.9    /  DBili  1.3    /  AST  24     /  ALT  25     /  AlkPhos  240    08 Jan 2025 06:20                          6.9    11.64 )-----------( 102      ( 08 Jan 2025 06:20 )             22.0       ECHO: < from: TTE Limited W or WO Ultrasound Enhancing Agent (01.06.25 @ 12:55) >  1. Technically difficult image quality.   2. Left ventricular systolic function is normal with an ejection fraction visually estimated at 65 to 70 %.   3. Normal right ventricular cavity size, with normal wall thickness, and normal right ventricular systolic function.   4. Compared to the transthoracic echocardiogram performed on 7/21/2024, there have been no significant interval changes        < from: TTE W or WO Ultrasound Enhancing Agent (07.21.24 @ 15:40) >  1. Left ventricular cavity is normal in size. Left ventricular wall thickness is normal. Left ventricular systolic function is normal with an ejection fraction visually estimated at 55 to 60 %.   2. Normal left ventricular diastolic function.   3. Normal right ventricular cavity size and normal right ventricular systolic function.   4. The left atrium is normal in size.   5. The right atrium is normal in size.   6. Trileaflet aortic valve with normal systolic excursion.   7. Structurally normal mitral valve with normal leaflet excursion.   8. Trace mitral regurgitation.   9. Mild tricuspid regurgitation.  10. Estimated pulmonary artery systolic pressure is 23 mmHg, normal pulmonary artery pressure.  11. The interatrial septum appears intact.  12. No pericardial effusion seen.          Assessment:  39y  Female with h/o metastatic breast cancer ER/RI Neg, HER-2 + on chemotherapy (last dose 12/26/24) (mets to lung, liver, spleen, spine, bone and brain), gastritis w/ intermittent episodes of dark stool (follows w/ Dr. Rosado GI and is on PPI and octreotide daily). Patient presented 1/5 with c/o worsening SOB.  She was seen in ED 1/2/25 for for weakness and SOB at which time she was found to have Hb of 5 requiring PRBC transfusion and positive for Flu A and started on tamiflu and was discharged from the ED on 1/3/25.  Her respiratory status has worsened since then w/ productive cough associated with body aches and chills.  Denies sick contacts but has had many frequent hospital visits. Patient has been afebrile, no leukocytosis. Currently on HFNC for worsening respiratory status. continued on Tamiflu. Vancomycn and Zosyn was added. Blood cultures with MSSA.  Cardiology consultation requested for evaluation of endocarditis.   -Patient better now on NC.    Plan:  1. MARYANN tomorrow.  2. Abx per primary team.

## 2025-01-08 NOTE — PROGRESS NOTE ADULT - SUBJECTIVE AND OBJECTIVE BOX
SUBJECTIVE    BRIEF HOSPITAL COURSE SUMMARY: 40 y/o F w/ PMH of metastatic breast cancer ER/MO Neg, HER-2 pos on chemotherapy (last dose 12/26/24) (mets to lung, liver, spleen, spine, bone and brain), gastritis w/ intermittent episodes of dark stool (follows w/ Dr. Rosado GI and is on PPI and octreotide daily) presented for worsening sob over the past few days.  Pt was seen in ED 1/2/25 for for weakness and sob at which time she was found to have Hb of 5 requiring prbc transfusion and positive for Flu A and started on tamiflu.  Patient was upgraded to SDU after being noted to be toxic appearing with severely increased WOB. Patient was desating, and diffusely wheezing. Patient started on BiPAP and continuous nebulization, since weaned to nocturnal NIIV. Pulmonology asked to evaluate for acute hypoxic respiratory failure.     LAST 24 HOURS:   -blood cx + for gram positive cocci    TODAY:  Pt seen at bedside this AM. Pt states her breathing has significantly improved, still SOB at basline. expresses concern about prolonged abx course. Denies HA, DZ, Vision Changes, Sore throat, cough, CP, Abdominal pain, diarrhea, constipation or dysuria. . Offers no other acute complaints & ROS otherwise negative.     OBJECTIVE  PHYSICAL EXAM:  GENERAL: No acute distress, ill appearing female, laying in bed  HEENT: Atraumatic, normocephalic, non-icteric, no JVD  NEURO: A&Ox3, no focal deficits, moving all extremities spontaneously, no dysarthria, CN II-XII grossly intact  PSYCH: Normal affect, calm, appropriate insight and judgment, fluent speech  LUNGS: Diffused rhonchi noted in all lung fields, equal air entry and chest rise, no appreciable crepitus or wheezing  HEART: RRR, no murmur appreciated  ABD: Soft, non-tender, non-distended, no organomegaly, no appreciable masses, +bs   EXTREMITIES: Nontender, no clubbing, cyanosis, or edema        Vital Signs Last 24 Hrs  T(C): 36.4 (08 Jan 2025 11:01), Max: 37.1 (08 Jan 2025 08:27)  T(F): 97.5 (08 Jan 2025 11:01), Max: 98.7 (08 Jan 2025 08:27)  HR: 84 (08 Jan 2025 12:00) (77 - 109)  BP: 111/67 (08 Jan 2025 12:00) (104/63 - 131/80)  BP(mean): 82 (08 Jan 2025 12:00) (75 - 96)  RR: 18 (08 Jan 2025 11:01) (14 - 25)  SpO2: 100% (08 Jan 2025 12:00) (92% - 100%)    Parameters below as of 08 Jan 2025 12:00  Patient On (Oxygen Delivery Method): nasal cannula  O2 Flow (L/min): 4          MEDICATIONS  (STANDING):  acetylcysteine 10%  Inhalation 4 milliLiter(s) Inhalation every 6 hours  chlorhexidine 2% Cloths 1 Application(s) Topical daily  dextrose 5%. 1000 milliLiter(s) (50 mL/Hr) IV Continuous <Continuous>  dextrose 5%. 1000 milliLiter(s) (100 mL/Hr) IV Continuous <Continuous>  dextrose 50% Injectable 25 Gram(s) IV Push once  dextrose 50% Injectable 12.5 Gram(s) IV Push once  dextrose 50% Injectable 25 Gram(s) IV Push once  glucagon  Injectable 1 milliGRAM(s) IntraMuscular once  hydrocortisone 1% Cream 1 Application(s) Topical two times a day  HYDROmorphone PCA (1 mG/mL) 30 milliLiter(s) PCA Continuous PCA Continuous  insulin lispro (ADMELOG) corrective regimen sliding scale   SubCutaneous three times a day before meals  levalbuterol Inhalation 1.25 milliGRAM(s) Inhalation every 6 hours  methadone    Tablet 10 milliGRAM(s) Oral at bedtime  methadone    Tablet 5 milliGRAM(s) Oral <User Schedule>  methylPREDNISolone sodium succinate Injectable 40 milliGRAM(s) IV Push every 8 hours  nafcillin  IVPB 2 Gram(s) IV Intermittent every 4 hours  octreotide  Injectable 100 MICROGram(s) SubCutaneous two times a day  oseltamivir 75 milliGRAM(s) Oral two times a day  pantoprazole  Injectable 40 milliGRAM(s) IV Push every 12 hours  pregabalin 200 milliGRAM(s) Oral every 8 hours    MEDICATIONS  (PRN):  acetaminophen     Tablet .. 650 milliGRAM(s) Oral every 6 hours PRN Temp greater or equal to 38C (100.4F), Mild Pain (1 - 3)  aluminum hydroxide/magnesium hydroxide/simethicone Suspension 30 milliLiter(s) Oral every 4 hours PRN Dyspepsia  benzonatate 100 milliGRAM(s) Oral every 8 hours PRN Cough  dextrose Oral Gel 15 Gram(s) Oral once PRN Blood Glucose LESS THAN 70 milliGRAM(s)/deciliter  guaifenesin/dextromethorphan Oral Liquid 10 milliLiter(s) Oral every 4 hours PRN Cough  melatonin 3 milliGRAM(s) Oral at bedtime PRN Insomnia  naloxone Injectable 0.1 milliGRAM(s) IV Push every 3 minutes PRN For ANY of the following changes in patient status:  A. RR LESS THAN 10 breaths per minute, B. Oxygen saturation LESS THAN 90%, C. Sedation score of 6  ondansetron Injectable 4 milliGRAM(s) IV Push every 8 hours PRN Nausea and/or Vomiting    Allergies    pertuzumab (Other (Severe))  Perjeta (Other (Severe))    Intolerances        LABS:                        6.9    11.64 )-----------( 102      ( 08 Jan 2025 06:20 )             22.0     01-08    140  |  103  |  17.8  ----------------------------<  112[H]  3.5   |  27.0  |  0.56    Ca    6.8[L]      08 Jan 2025 06:20  Mg     1.9     01-08    TPro  5.4[L]  /  Alb  2.5[L]  /  TBili  1.9  /  DBili  1.3[H]  /  AST  24  /  ALT  25  /  AlkPhos  240[H]  01-08      Urinalysis Basic - ( 08 Jan 2025 06:20 )    Color: x / Appearance: x / SG: x / pH: x  Gluc: 112 mg/dL / Ketone: x  / Bili: x / Urobili: x   Blood: x / Protein: x / Nitrite: x   Leuk Esterase: x / RBC: x / WBC x   Sq Epi: x / Non Sq Epi: x / Bacteria: x      CAPILLARY BLOOD GLUCOSE      POCT Blood Glucose.: 259 mg/dL (08 Jan 2025 12:54)  POCT Blood Glucose.: 140 mg/dL (08 Jan 2025 09:09)  POCT Blood Glucose.: 294 mg/dL (07 Jan 2025 17:04)      CULTURE DATA:    Culture - Blood (collected 01-05-25 @ 10:54)  Source: .Blood BLOOD  Gram Stain (01-07-25 @ 09:41):    Growth in anaerobic bottle: Gram Positive Cocci in Clusters    Growth in aerobic bottle: Gram Positive Cocci in Clusters  Final Report (01-08-25 @ 08:44):    Growth in aerobic and anaerobic bottles: Staphylococcus aureus    Direct identification is available within approximately 3-5    hours either by Blood Panel Multiplexed PCR or Direct    MALDI-TOF. Details: [email or web address]  Organism: Blood Culture PCR  Staphylococcus aureus (01-08-25 @ 08:44)  Organism: Staphylococcus aureus (01-08-25 @ 08:44)    Sensitivities:      Method Type: NAPOLEON      -  Ceftaroline: S <=0.5      -  Clindamycin: S <=0.25      -  Erythromycin: S <=0.25      -  Gentamicin: S <=4 Should not be used as monotherapy      -  Oxacillin: S <=0.25 Oxacillin predicts susceptibility for dicloxacillin, methicillin, and nafcillin      -  Penicillin: R >2      -  Rifampin: S <=1 Should not be used as monotherapy      -  Tetracycline: S <=4      -  Trimethoprim/Sulfamethoxazole: S <=0.5/9.5      -  Vancomycin: S 1  Organism: Blood Culture PCR (01-08-25 @ 08:44)    Sensitivities:      Method Type: PCR      -  Methicillin SENSITIVE Staphylococcus aureus (MSSA): Detec Any isolate of Staphylococcus aureus from a blood culture is NOT considered a contaminant.    Culture - Urine (collected 01-05-25 @ 12:34)  Source: Clean Catch Clean Catch (Midstream)  Final Report (01-06-25 @ 16:49):    10,000 - 49,000 CFU/mL Escherichia coli    <10,000 CFU/mL Normal Urogenital Sylvester  Organism: Escherichia coli (01-07-25 @ 10:05)  Organism: Escherichia coli (01-07-25 @ 10:05)    Sensitivities:      Method Type: NAPOLEON      -  Amoxicillin/Clavulanic Acid: S <=8/4      -  Ampicillin: R >16 These ampicillin results predict results for amoxicillin      -  Ampicillin/Sulbactam: I 16/8      -  Aztreonam: S <=4      -  Cefazolin: S 4 For uncomplicated UTI with K. pneumoniae, E. coli, or P. mirablis: NAPOLEON <=16 is sensitive and NAPOLEON >=32 is resistant. This also predicts results for oral agents cefaclor, cefdinir, cefpodoxime, cefprozil, cefuroxime axetil, cephalexin and locarbef for uncomplicated UTI. Note that some isolates may be susceptible to these agents while testing resistant to cefazolin.      -  Cefepime: S <=2      -  Cefoxitin: S <=8      -  Ceftriaxone: S <=1      -  Cefuroxime: S <=4      -  Ciprofloxacin: S <=0.25      -  Ertapenem: S <=0.5      -  Gentamicin: S <=2      -  Imipenem: S <=1      -  Levofloxacin: S <=0.5      -  Meropenem: S <=1      -  Nitrofurantoin: S <=32 Should not be used to treat pyelonephritis      -  Piperacillin/Tazobactam: S <=8      -  Tobramycin: S <=2      -  Trimethoprim/Sulfamethoxazole: R >2/38    Culture - Sputum (collected 01-06-25 @ 01:34)  Source: .Sputum Sputum  Gram Stain (01-06-25 @ 13:49):    Moderate Squamous epithelial cells seen per low power field    Moderate polymorphonuclear leukocytes seen per low power field    Few Gram Negative Rods seen per oil power field    Few Gram positive cocci in pairs seen per oil power field    Rare Gram Positive Rods seen per oil power field    Rare Yeast like cells seen per oil power field  Final Report (01-08-25 @ 06:50):    Moderate Staphylococcus aureus    Commensal sylvester consistent with body site  Organism: Staphylococcus aureus (01-08-25 @ 06:50)  Organism: Staphylococcus aureus (01-08-25 @ 06:50)    Sensitivities:      Method Type: NAPOLEON      -  Clindamycin: S <=0.25      -  Erythromycin: S <=0.25      -  Gentamicin: S <=4 Should not be used as monotherapy      -  Oxacillin: S <=0.25 Oxacillin predicts susceptibility for dicloxacillin, methicillin, and nafcillin      -  Penicillin: R >2      -  Rifampin: S <=1 Should not be used as monotherapy      -  Tetracycline: S <=4      -  Trimethoprim/Sulfamethoxazole: S <=0.5/9.5      -  Vancomycin: S 1    Culture - Blood (collected 01-07-25 @ 06:15)  Source: .Blood BLOOD  Gram Stain (01-08-25 @ 08:21):    Growth in aerobic bottle: Gram Positive Cocci in Clusters  Preliminary Report (01-08-25 @ 08:22):    Growth in aerobic bottle: Gram Positive Cocci in Clusters    Culture - Blood (collected 01-07-25 @ 06:19)  Source: .Blood BLOOD  Gram Stain (01-08-25 @ 13:03):    Growth in aerobic bottle: Gram Positive Cocci in Clusters  Preliminary Report (01-08-25 @ 13:03):      RADIOLOGY & ADDITIONAL TESTS:    < from: CT Chest No Cont (01.08.25 @ 08:56) >  FINDINGS:  Lines and Tubes: There is a portacatheter in right anterior chest wall   with the tip terminating at the level of cavoatrial junction.    Mediastinum: The thyroid and thoracic inlet are normal. There is no   enlarged axiliary, mediastinal, or hilar lymph nodes. The heart size is   within normal limits. Interventricular septumis hyperattenuating   relative to the blood pool, suggestive of anemia.There is small   pericardial effusion. The aorta is normal in course and caliber, with no   significant calcified atheromas. The esophagus is unremarkable.    Lung: The central tracheobronchial tree is patent. There has been   interval increase in bilateral groundglass opacities. In addition, there   are small consolidations in all 5 lobes, relatively unchanged.    Pleura: Previously visualized left pleural effusion has been   resolved/been evacuated. There is redemonstration of a small loculated   pleural effusion along left major fissure.    Abdomen: There is splenomegaly.Limited evaluation of liver, pancreas,   adrenal glands, and upper pole of kidneys is unremarkable.    Bone and Soft Tissue: There are axillary or appendicular sclerotic   lesions. There is loss of height of T2 vertebral body. Patient status   post T12 and L1 vertebroplasty. There is a biopsy marker in right breast   tissue. There is asymmetric right breast skin thickening and inflammatory   changes.    IMPRESSION:  Progression of groundglass opacities and small consolidations in all 5   lobes, likely infectious  Right breast skin thickening and inflammatory changes. Sclerotic lesions   in axillary and appendicular osseous structures, likely enthesopathic    < end of copied text >    < from: TTE Limited W or WO Ultrasound Enhancing Agent (01.06.25 @ 12:55) >  CONCLUSIONS:      1. Technically difficult image quality.   2. Left ventricular systolic function is normal with an ejection fraction visually estimated at 65 to 70 %.   3. Normal right ventricular cavity size, with normal wall thickness, and normal right ventricular systolic function.   4. Compared to the transthoracic echocardiogram performed on 7/21/2024, there have been no significant interval changes.    ________________________________________________________________________________________  FINDINGS:     Left Ventricle:  Left ventricular systolic function is normal with an ejection fraction visually estimated at 65 to 70%.     Right Ventricle:  The right ventricular cavity is normal in size, with normal wall thickness and right ventricular systolic function is normal.     Pericardium:  No pericardial effusion seen.    < end of copied text >

## 2025-01-08 NOTE — PROGRESS NOTE ADULT - SUBJECTIVE AND OBJECTIVE BOX
Interval Hx:  Patient seen during rounds  Patient reports pain to be controlled on current medications  Patient denies sedation with medications    Analgesic Dosing for past 24 hours reviewed as below:    HYDROmorphone  Injectable   1 milliGRAM(s) IV Push (01-07-25 @ 09:00)    methadone    Tablet   10 milliGRAM(s) Oral (01-07-25 @ 21:06)    methadone    Tablet   5 milliGRAM(s) Oral (01-07-25 @ 15:52)    ondansetron Injectable   4 milliGRAM(s) IV Push (01-08-25 @ 03:16)    pregabalin   200 milliGRAM(s) Oral (01-08-25 @ 06:15)   200 milliGRAM(s) Oral (01-07-25 @ 21:06)   200 milliGRAM(s) Oral (01-07-25 @ 13:38)          T(C): 36.9 (01-08-25 @ 04:02), Max: 36.9 (01-07-25 @ 15:36)  HR: 77 (01-08-25 @ 06:00) (77 - 120)  BP: 112/77 (01-08-25 @ 06:00) (101/60 - 120/65)  RR: 14 (01-08-25 @ 06:00) (14 - 25)  SpO2: 95% (01-08-25 @ 06:00) (92% - 100%)      01-07-25 @ 07:01  -  01-08-25 @ 07:00  --------------------------------------------------------  IN: 300 mL / OUT: 0 mL / NET: 300 mL        acetaminophen     Tablet .. 650 milliGRAM(s) Oral every 6 hours PRN  acetylcysteine 10%  Inhalation 4 milliLiter(s) Inhalation every 6 hours  aluminum hydroxide/magnesium hydroxide/simethicone Suspension 30 milliLiter(s) Oral every 4 hours PRN  benzonatate 100 milliGRAM(s) Oral every 8 hours PRN  chlorhexidine 2% Cloths 1 Application(s) Topical daily  dextrose 5%. 1000 milliLiter(s) IV Continuous <Continuous>  dextrose 5%. 1000 milliLiter(s) IV Continuous <Continuous>  dextrose 50% Injectable 25 Gram(s) IV Push once  dextrose 50% Injectable 12.5 Gram(s) IV Push once  dextrose 50% Injectable 25 Gram(s) IV Push once  dextrose Oral Gel 15 Gram(s) Oral once PRN  glucagon  Injectable 1 milliGRAM(s) IntraMuscular once  guaifenesin/dextromethorphan Oral Liquid 10 milliLiter(s) Oral every 4 hours PRN  hydrocortisone 1% Cream 1 Application(s) Topical two times a day  HYDROmorphone PCA (1 mG/mL) 30 milliLiter(s) PCA Continuous PCA Continuous  insulin lispro (ADMELOG) corrective regimen sliding scale   SubCutaneous three times a day before meals  levalbuterol Inhalation 1.25 milliGRAM(s) Inhalation every 6 hours  melatonin 3 milliGRAM(s) Oral at bedtime PRN  methadone    Tablet 10 milliGRAM(s) Oral at bedtime  methadone    Tablet 5 milliGRAM(s) Oral <User Schedule>  methylPREDNISolone sodium succinate Injectable 40 milliGRAM(s) IV Push every 8 hours  nafcillin  IVPB 2 Gram(s) IV Intermittent every 4 hours  naloxone Injectable 0.1 milliGRAM(s) IV Push every 3 minutes PRN  octreotide  Injectable 100 MICROGram(s) SubCutaneous two times a day  ondansetron Injectable 4 milliGRAM(s) IV Push every 8 hours PRN  oseltamivir 75 milliGRAM(s) Oral two times a day  oseltamivir 75 milliGRAM(s) Oral two times a day  pantoprazole  Injectable 40 milliGRAM(s) IV Push every 12 hours  pregabalin 200 milliGRAM(s) Oral every 8 hours                          6.9    x     )-----------( 102      ( 08 Jan 2025 06:20 )             22.0     01-07    139  |  106  |  17.5  ----------------------------<  394[H]  3.3[L]   |  20.0[L]  |  0.61    Ca    7.2[L]      07 Jan 2025 06:15  Mg     2.3     01-07    TPro  5.5[L]  /  Alb  2.6[L]  /  TBili  3.1[H]  /  DBili  2.6[H]  /  AST  16  /  ALT  22  /  AlkPhos  228[H]  01-07      Urinalysis Basic - ( 07 Jan 2025 06:15 )    Color: x / Appearance: x / SG: x / pH: x  Gluc: 394 mg/dL / Ketone: x  / Bili: x / Urobili: x   Blood: x / Protein: x / Nitrite: x   Leuk Esterase: x / RBC: x / WBC x   Sq Epi: x / Non Sq Epi: x / Bacteria: x        Pain Service   [phone]

## 2025-01-08 NOTE — CHART NOTE - NSCHARTNOTEFT_GEN_A_CORE
RN called stating patient is itchy. There is no visible rash on the patients body.   Atarax 12.5mg and topical hydrocortisone 1% ordered.

## 2025-01-08 NOTE — PROGRESS NOTE ADULT - ASSESSMENT
40 y/o F w/ PMH of metastatic breast cancer ER/RI Neg, HER-2 pos on chemotherapy (last dose 12/26/24) (mets to lung, liver, spleen, spine, bone and brain), gastritis admitted for acute hypoxic respiratory failure 2/2 to influenza A with superimposed bacterial PNA, hospital course c/b bacteremia     Pulmonology consulted for acute worsening of respiratory status.     Problem List    #AHRF 2/2 Influenza A with superimposed bacterial PNA  #bacteremia   #Metastatic breast cancer  #pancytopenia  #transaminitis     -Maintain tele/  - CTA w/ PE protocol negative for acute PE but + for multifocal PNA vs pulm edema with metastatic dz  - Repeat CT performed 01/08 - w/ progression of groundglass opacities  - Blood Cx + for Gram + cocci  - No significant interval changes on repeat echo  - would continue diuresis  - cw nafcillin and oseltamivir  - cw methylprednisolone  - ABG without signs of hypercapnia  - Procal elevate  - trend lactate  - Nocturnal NIIV  - PRN NC, duonebs q6h   - Maintain low threshold to reconsult MICU should patient's clinical condition worsen further    Rest of care per primary team

## 2025-01-08 NOTE — CONSULT NOTE ADULT - SUBJECTIVE AND OBJECTIVE BOX
SUBJECTIVE    BRIEF HOSPITAL COURSE SUMMARY: 40 y/o F w/ PMH of metastatic breast cancer ER/KS Neg, HER-2 pos on chemotherapy (last dose 12/26/24) (mets to lung, liver, spleen, spine, bone and brain), gastritis w/ intermittent episodes of dark stool (follows w/ Dr. Rosado GI and is on PPI and octreotide daily) presented for worsening sob over the past few days.  Pt was seen in ED 1/2/25 for for weakness and sob at which time she was found to have Hb of 5 requiring prbc transfusion and positive for Flu A and started on tamiflu.  Patient was upgraded to SDU after being noted to be toxic appearing with severely increased WOB. Patient was desating, and diffusely wheezing. Patient started on BiPAP and continuous nebulization, since weaned to nocturnal NIIV. Pulmonology asked to evaluate for acute hypoxic respiratory failure.     LAST 24 HOURS:   -blood cx + for gram positive cocci    TODAY:  Pt seen at bedside this AM. Pt states her breathing has significantly improved, still SOB at basline. expresses concern about prolonged abx course. Denies HA, DZ, Vision Changes, Sore throat, cough, CP, Abdominal pain, diarrhea, constipation or dysuria. . Offers no other acute complaints & ROS otherwise negative.     OBJECTIVE  PHYSICAL EXAM:  GENERAL: No acute distress, ill appearing female, laying in bed  HEENT: Atraumatic, normocephalic, non-icteric, no JVD  NEURO: A&Ox3, no focal deficits, moving all extremities spontaneously, no dysarthria, CN II-XII grossly intact  PSYCH: Normal affect, calm, appropriate insight and judgment, fluent speech  LUNGS: Diffused rhonchi noted in all lung fields, equal air entry and chest rise, no appreciable crepitus or wheezing  HEART: RRR, no murmur appreciated  ABD: Soft, non-tender, non-distended, no organomegaly, no appreciable masses, +bs   EXTREMITIES: Nontender, no clubbing, cyanosis, or edema        Vital Signs Last 24 Hrs  T(C): 36.4 (08 Jan 2025 11:01), Max: 37.1 (08 Jan 2025 08:27)  T(F): 97.5 (08 Jan 2025 11:01), Max: 98.7 (08 Jan 2025 08:27)  HR: 84 (08 Jan 2025 12:00) (77 - 109)  BP: 111/67 (08 Jan 2025 12:00) (104/63 - 131/80)  BP(mean): 82 (08 Jan 2025 12:00) (75 - 96)  RR: 18 (08 Jan 2025 11:01) (14 - 25)  SpO2: 100% (08 Jan 2025 12:00) (92% - 100%)    Parameters below as of 08 Jan 2025 12:00  Patient On (Oxygen Delivery Method): nasal cannula  O2 Flow (L/min): 4          MEDICATIONS  (STANDING):  acetylcysteine 10%  Inhalation 4 milliLiter(s) Inhalation every 6 hours  chlorhexidine 2% Cloths 1 Application(s) Topical daily  dextrose 5%. 1000 milliLiter(s) (50 mL/Hr) IV Continuous <Continuous>  dextrose 5%. 1000 milliLiter(s) (100 mL/Hr) IV Continuous <Continuous>  dextrose 50% Injectable 25 Gram(s) IV Push once  dextrose 50% Injectable 12.5 Gram(s) IV Push once  dextrose 50% Injectable 25 Gram(s) IV Push once  glucagon  Injectable 1 milliGRAM(s) IntraMuscular once  hydrocortisone 1% Cream 1 Application(s) Topical two times a day  HYDROmorphone PCA (1 mG/mL) 30 milliLiter(s) PCA Continuous PCA Continuous  insulin lispro (ADMELOG) corrective regimen sliding scale   SubCutaneous three times a day before meals  levalbuterol Inhalation 1.25 milliGRAM(s) Inhalation every 6 hours  methadone    Tablet 10 milliGRAM(s) Oral at bedtime  methadone    Tablet 5 milliGRAM(s) Oral <User Schedule>  methylPREDNISolone sodium succinate Injectable 40 milliGRAM(s) IV Push every 8 hours  nafcillin  IVPB 2 Gram(s) IV Intermittent every 4 hours  octreotide  Injectable 100 MICROGram(s) SubCutaneous two times a day  oseltamivir 75 milliGRAM(s) Oral two times a day  pantoprazole  Injectable 40 milliGRAM(s) IV Push every 12 hours  pregabalin 200 milliGRAM(s) Oral every 8 hours    MEDICATIONS  (PRN):  acetaminophen     Tablet .. 650 milliGRAM(s) Oral every 6 hours PRN Temp greater or equal to 38C (100.4F), Mild Pain (1 - 3)  aluminum hydroxide/magnesium hydroxide/simethicone Suspension 30 milliLiter(s) Oral every 4 hours PRN Dyspepsia  benzonatate 100 milliGRAM(s) Oral every 8 hours PRN Cough  dextrose Oral Gel 15 Gram(s) Oral once PRN Blood Glucose LESS THAN 70 milliGRAM(s)/deciliter  guaifenesin/dextromethorphan Oral Liquid 10 milliLiter(s) Oral every 4 hours PRN Cough  melatonin 3 milliGRAM(s) Oral at bedtime PRN Insomnia  naloxone Injectable 0.1 milliGRAM(s) IV Push every 3 minutes PRN For ANY of the following changes in patient status:  A. RR LESS THAN 10 breaths per minute, B. Oxygen saturation LESS THAN 90%, C. Sedation score of 6  ondansetron Injectable 4 milliGRAM(s) IV Push every 8 hours PRN Nausea and/or Vomiting    Allergies    pertuzumab (Other (Severe))  Perjeta (Other (Severe))    Intolerances        LABS:                        6.9    11.64 )-----------( 102      ( 08 Jan 2025 06:20 )             22.0     01-08    140  |  103  |  17.8  ----------------------------<  112[H]  3.5   |  27.0  |  0.56    Ca    6.8[L]      08 Jan 2025 06:20  Mg     1.9     01-08    TPro  5.4[L]  /  Alb  2.5[L]  /  TBili  1.9  /  DBili  1.3[H]  /  AST  24  /  ALT  25  /  AlkPhos  240[H]  01-08      Urinalysis Basic - ( 08 Jan 2025 06:20 )    Color: x / Appearance: x / SG: x / pH: x  Gluc: 112 mg/dL / Ketone: x  / Bili: x / Urobili: x   Blood: x / Protein: x / Nitrite: x   Leuk Esterase: x / RBC: x / WBC x   Sq Epi: x / Non Sq Epi: x / Bacteria: x      CAPILLARY BLOOD GLUCOSE      POCT Blood Glucose.: 259 mg/dL (08 Jan 2025 12:54)  POCT Blood Glucose.: 140 mg/dL (08 Jan 2025 09:09)  POCT Blood Glucose.: 294 mg/dL (07 Jan 2025 17:04)      CULTURE DATA:    Culture - Blood (collected 01-05-25 @ 10:54)  Source: .Blood BLOOD  Gram Stain (01-07-25 @ 09:41):    Growth in anaerobic bottle: Gram Positive Cocci in Clusters    Growth in aerobic bottle: Gram Positive Cocci in Clusters  Final Report (01-08-25 @ 08:44):    Growth in aerobic and anaerobic bottles: Staphylococcus aureus    Direct identification is available within approximately 3-5    hours either by Blood Panel Multiplexed PCR or Direct    MALDI-TOF. Details: [email or web address]  Organism: Blood Culture PCR  Staphylococcus aureus (01-08-25 @ 08:44)  Organism: Staphylococcus aureus (01-08-25 @ 08:44)    Sensitivities:      Method Type: NAPOLEON      -  Ceftaroline: S <=0.5      -  Clindamycin: S <=0.25      -  Erythromycin: S <=0.25      -  Gentamicin: S <=4 Should not be used as monotherapy      -  Oxacillin: S <=0.25 Oxacillin predicts susceptibility for dicloxacillin, methicillin, and nafcillin      -  Penicillin: R >2      -  Rifampin: S <=1 Should not be used as monotherapy      -  Tetracycline: S <=4      -  Trimethoprim/Sulfamethoxazole: S <=0.5/9.5      -  Vancomycin: S 1  Organism: Blood Culture PCR (01-08-25 @ 08:44)    Sensitivities:      Method Type: PCR      -  Methicillin SENSITIVE Staphylococcus aureus (MSSA): Detec Any isolate of Staphylococcus aureus from a blood culture is NOT considered a contaminant.    Culture - Urine (collected 01-05-25 @ 12:34)  Source: Clean Catch Clean Catch (Midstream)  Final Report (01-06-25 @ 16:49):    10,000 - 49,000 CFU/mL Escherichia coli    <10,000 CFU/mL Normal Urogenital Sylvester  Organism: Escherichia coli (01-07-25 @ 10:05)  Organism: Escherichia coli (01-07-25 @ 10:05)    Sensitivities:      Method Type: NAPOLEON      -  Amoxicillin/Clavulanic Acid: S <=8/4      -  Ampicillin: R >16 These ampicillin results predict results for amoxicillin      -  Ampicillin/Sulbactam: I 16/8      -  Aztreonam: S <=4      -  Cefazolin: S 4 For uncomplicated UTI with K. pneumoniae, E. coli, or P. mirablis: NAPOLEON <=16 is sensitive and NAPOLEON >=32 is resistant. This also predicts results for oral agents cefaclor, cefdinir, cefpodoxime, cefprozil, cefuroxime axetil, cephalexin and locarbef for uncomplicated UTI. Note that some isolates may be susceptible to these agents while testing resistant to cefazolin.      -  Cefepime: S <=2      -  Cefoxitin: S <=8      -  Ceftriaxone: S <=1      -  Cefuroxime: S <=4      -  Ciprofloxacin: S <=0.25      -  Ertapenem: S <=0.5      -  Gentamicin: S <=2      -  Imipenem: S <=1      -  Levofloxacin: S <=0.5      -  Meropenem: S <=1      -  Nitrofurantoin: S <=32 Should not be used to treat pyelonephritis      -  Piperacillin/Tazobactam: S <=8      -  Tobramycin: S <=2      -  Trimethoprim/Sulfamethoxazole: R >2/38    Culture - Sputum (collected 01-06-25 @ 01:34)  Source: .Sputum Sputum  Gram Stain (01-06-25 @ 13:49):    Moderate Squamous epithelial cells seen per low power field    Moderate polymorphonuclear leukocytes seen per low power field    Few Gram Negative Rods seen per oil power field    Few Gram positive cocci in pairs seen per oil power field    Rare Gram Positive Rods seen per oil power field    Rare Yeast like cells seen per oil power field  Final Report (01-08-25 @ 06:50):    Moderate Staphylococcus aureus    Commensal sylvester consistent with body site  Organism: Staphylococcus aureus (01-08-25 @ 06:50)  Organism: Staphylococcus aureus (01-08-25 @ 06:50)    Sensitivities:      Method Type: NAPOLEON      -  Clindamycin: S <=0.25      -  Erythromycin: S <=0.25      -  Gentamicin: S <=4 Should not be used as monotherapy      -  Oxacillin: S <=0.25 Oxacillin predicts susceptibility for dicloxacillin, methicillin, and nafcillin      -  Penicillin: R >2      -  Rifampin: S <=1 Should not be used as monotherapy      -  Tetracycline: S <=4      -  Trimethoprim/Sulfamethoxazole: S <=0.5/9.5      -  Vancomycin: S 1    Culture - Blood (collected 01-07-25 @ 06:15)  Source: .Blood BLOOD  Gram Stain (01-08-25 @ 08:21):    Growth in aerobic bottle: Gram Positive Cocci in Clusters  Preliminary Report (01-08-25 @ 08:22):    Growth in aerobic bottle: Gram Positive Cocci in Clusters    Culture - Blood (collected 01-07-25 @ 06:19)  Source: .Blood BLOOD  Gram Stain (01-08-25 @ 13:03):    Growth in aerobic bottle: Gram Positive Cocci in Clusters  Preliminary Report (01-08-25 @ 13:03):      RADIOLOGY & ADDITIONAL TESTS:    < from: CT Chest No Cont (01.08.25 @ 08:56) >  FINDINGS:  Lines and Tubes: There is a portacatheter in right anterior chest wall   with the tip terminating at the level of cavoatrial junction.    Mediastinum: The thyroid and thoracic inlet are normal. There is no   enlarged axiliary, mediastinal, or hilar lymph nodes. The heart size is   within normal limits. Interventricular septumis hyperattenuating   relative to the blood pool, suggestive of anemia.There is small   pericardial effusion. The aorta is normal in course and caliber, with no   significant calcified atheromas. The esophagus is unremarkable.    Lung: The central tracheobronchial tree is patent. There has been   interval increase in bilateral groundglass opacities. In addition, there   are small consolidations in all 5 lobes, relatively unchanged.    Pleura: Previously visualized left pleural effusion has been   resolved/been evacuated. There is redemonstration of a small loculated   pleural effusion along left major fissure.    Abdomen: There is splenomegaly.Limited evaluation of liver, pancreas,   adrenal glands, and upper pole of kidneys is unremarkable.    Bone and Soft Tissue: There are axillary or appendicular sclerotic   lesions. There is loss of height of T2 vertebral body. Patient status   post T12 and L1 vertebroplasty. There is a biopsy marker in right breast   tissue. There is asymmetric right breast skin thickening and inflammatory   changes.    IMPRESSION:  Progression of groundglass opacities and small consolidations in all 5   lobes, likely infectious  Right breast skin thickening and inflammatory changes. Sclerotic lesions   in axillary and appendicular osseous structures, likely enthesopathic    < end of copied text >    < from: TTE Limited W or WO Ultrasound Enhancing Agent (01.06.25 @ 12:55) >  CONCLUSIONS:      1. Technically difficult image quality.   2. Left ventricular systolic function is normal with an ejection fraction visually estimated at 65 to 70 %.   3. Normal right ventricular cavity size, with normal wall thickness, and normal right ventricular systolic function.   4. Compared to the transthoracic echocardiogram performed on 7/21/2024, there have been no significant interval changes.    ________________________________________________________________________________________  FINDINGS:     Left Ventricle:  Left ventricular systolic function is normal with an ejection fraction visually estimated at 65 to 70%.     Right Ventricle:  The right ventricular cavity is normal in size, with normal wall thickness and right ventricular systolic function is normal.     Pericardium:  No pericardial effusion seen.    < end of copied text >

## 2025-01-08 NOTE — PROGRESS NOTE ADULT - ATTENDING COMMENTS
acute hypoxic respiratory failure  persistent MSSA bacteremia  influenza A, likely superimposed MSSA pneumonia  metastatic breast can   port in place   pancytopenia    - continue antibiotic per ID  - respiratory status improved, now on nasal canula  - continue PRN bipap, no evidence of hypercapnea  - diuretic plan per cardio and primary team   - no pulmonary contraindication for MARYANN tomorrow  - pulm will follow

## 2025-01-08 NOTE — PROGRESS NOTE ADULT - SUBJECTIVE AND OBJECTIVE BOX
[facility]  INFECTIOUS DISEASES at [place] / [place] / [place]  =======================================================                              Salazar Toro MD                              Professor Emeritus:  Dr Warner Mcintosh MD            Diplomates American Board of Internal Medicine & Infectious Diseases                                   Tel  [phone] Fax [phone]                                  Hospital Consult line:  [phone]  =======================================================      [place] [id number]    Follow up: MSSA bacteremia    No fevers   Off HFNC now on 3L NC      Allergies:  pertuzumab (Other (Severe))  Perjeta (Other (Severe))        REVIEW OF SYSTEMS:  CONSTITUTIONAL:  No Fever or chills  HEENT:   No diplopia or blurred vision.  No earache, sore throat or runny nose.  CARDIOVASCULAR:  No Chest Pain  RESPIRATORY:  No cough, shortness of breath  GASTROINTESTINAL:  No nausea, vomiting or diarrhea.  GENITOURINARY:  No dysuria, frequency or urgency. No Blood in urine  MUSCULOSKELETAL:  no joint aches, no muscle pain  SKIN:  No change in skin, hair or nails.  NEUROLOGIC:  No Headaches      Physical Exam:  GEN: NAD  HEENT: normocephalic and atraumatic.    NECK: Supple.   LUNGS: CTA B/L.  HEART: RRR  ABDOMEN: Soft, NT, ND.  +BS.    : No CVA tenderness  EXTREMITIES: Without  edema.  MSK: No joint swelling  NEUROLOGIC: No Focal Deficits   SKIN: No rash  + PORT      Vitals:  T(F): 98.7 (08 Jan 2025 08:27), Max: 98.7 (08 Jan 2025 08:27)  HR: 99 (08 Jan 2025 10:19)  BP: 104/63 (08 Jan 2025 10:12)  RR: 18 (08 Jan 2025 10:12)  SpO2: 96% (08 Jan 2025 10:19) (92% - 100%)  temp max in last 48H T(F): , Max: 99 (01-07-25 @ 02:00)    Current Antibiotics:  nafcillin  IVPB 2 Gram(s) IV Intermittent every 4 hours  oseltamivir 75 milliGRAM(s) Oral two times a day  oseltamivir 75 milliGRAM(s) Oral two times a day    Other medications:  acetylcysteine 10%  Inhalation 4 milliLiter(s) Inhalation every 6 hours  chlorhexidine 2% Cloths 1 Application(s) Topical daily  dextrose 5%. 1000 milliLiter(s) IV Continuous <Continuous>  dextrose 5%. 1000 milliLiter(s) IV Continuous <Continuous>  dextrose 50% Injectable 25 Gram(s) IV Push once  dextrose 50% Injectable 12.5 Gram(s) IV Push once  dextrose 50% Injectable 25 Gram(s) IV Push once  furosemide   Injectable 40 milliGRAM(s) IV Push once  glucagon  Injectable 1 milliGRAM(s) IntraMuscular once  hydrocortisone 1% Cream 1 Application(s) Topical two times a day  HYDROmorphone PCA (1 mG/mL) 30 milliLiter(s) PCA Continuous PCA Continuous  insulin lispro (ADMELOG) corrective regimen sliding scale   SubCutaneous three times a day before meals  levalbuterol Inhalation 1.25 milliGRAM(s) Inhalation every 6 hours  methadone    Tablet 10 milliGRAM(s) Oral at bedtime  methadone    Tablet 5 milliGRAM(s) Oral <User Schedule>  methylPREDNISolone sodium succinate Injectable 40 milliGRAM(s) IV Push every 8 hours  octreotide  Injectable 100 MICROGram(s) SubCutaneous two times a day  pantoprazole  Injectable 40 milliGRAM(s) IV Push every 12 hours  pregabalin 200 milliGRAM(s) Oral every 8 hours                            6.9    11.64 )-----------( 102      ( 08 Jan 2025 06:20 )             22.0     01-08    140  |  103  |  17.8  ----------------------------<  112[H]  3.5   |  27.0  |  0.56    Ca    6.8[L]      08 Jan 2025 06:20  Mg     1.9     01-08    TPro  5.4[L]  /  Alb  2.5[L]  /  TBili  1.9  /  DBili  1.3[H]  /  AST  24  /  ALT  25  /  AlkPhos  240[H]  01-08    RECENT CULTURES:  01-07 @ 06:15 .Blood BLOOD     Growth in aerobic bottle: Gram Positive Cocci in Clusters  Growth in aerobic bottle: Gram Positive Cocci in Clusters    01-06 @ 01:34 .Sputum Sputum Staphylococcus aureus    Moderate Staphylococcus aureus  Commensal sylvester consistent with body site  Moderate Squamous epithelial cells seen per low power field  Moderate polymorphonuclear leukocytes seen per low power field  Few Gram Negative Rods seen per oil power field  Few Gram positive cocci in pairs seen per oil power field  Rare Gram Positive Rods seen per oil power field  Rare Yeast like cells seen per oil power field    01-05 @ 12:34 Clean Catch Clean Catch (Midstream) Escherichia coli    10,000 - 49,000 CFU/mL Escherichia coli  <10,000 CFU/mL Normal Urogenital Sylvester    01-05 @ 10:54 .Blood BLOOD Blood Culture PCR  Staphylococcus aureus    Growth in aerobic and anaerobic bottles: Staphylococcus aureus  Direct identification is available within approximately 3-5  hours either by Blood Panel Multiplexed PCR or Direct  MALDI-TOF. Details: [email or web address]  Growth in anaerobic bottle: Gram Positive Cocci in Clusters  Growth in aerobic bottle: Gram Positive Cocci in Clusters      WBC Count: 11.64 K/uL (01-08-25 @ 06:20)  WBC Count: 7.43 K/uL (01-07-25 @ 12:23)  WBC Count: 6.03 K/uL (01-07-25 @ 06:15)  WBC Count: 6.51 K/uL (01-07-25 @ 02:54)  WBC Count: 2.96 K/uL (01-06-25 @ 07:36)  WBC Count: 2.66 K/uL (01-06-25 @ 06:00)  WBC Count: 2.97 K/uL (01-05-25 @ 19:27)  WBC Count: 3.05 K/uL (01-05-25 @ 10:54)    Creatinine: 0.56 mg/dL (01-08-25 @ 06:20)  Creatinine: 0.61 mg/dL (01-07-25 @ 06:15)  Creatinine: 0.49 mg/dL (01-06-25 @ 07:36)  Creatinine: 0.44 mg/dL (01-05-25 @ 10:54)        Procalcitonin: 1.37 ng/mL (01-08-25 @ 06:20)  Procalcitonin: 2.32 ng/mL (01-07-25 @ 06:15)     SARS-CoV-2: NotDetec (01-05-25 @ 10:54)  SARS-CoV-2 Result: NotDetec (01-02-25 @ 19:20)          Influenza AH3 (RapRVP): Detected (01.05.25 @ 10:54)      Urinalysis + Microscopic Examination (01.06.25 @ 03:00)    pH Urine: 6.0   Urine Appearance: Clear   Color: Yellow   Specific Gravity: 1.015   Protein, Urine: Trace mg/dL   Glucose Qualitative, Urine: Negative mg/dL   Ketone - Urine: Negative mg/dL   Blood, Urine: Small   Bilirubin: Negative   Urobilinogen: 1.0 mg/dL   Leukocyte Esterase Concentration: Small   Nitrite: Negative   White Blood Cell - Urine: 6 /HPF   Red Blood Cell - Urine: 5 /HPF   Bacteria: Occasional /HPF   Epithelial Cells: 2 /HPF      < from: TTE Limited W or WO Ultrasound Enhancing Agent (01.06.25 @ 12:55) >  CONCLUSIONS:      1. Technically difficult image quality.   2. Left ventricular systolic function is normal with an ejection fraction visually estimated at 65 to 70 %.   3. Normal right ventricular cavity size, with normal wall thickness, and normal right ventricular systolic function.   4. Compared to the transthoracic echocardiogram performed on 7/21/2024, there have been no significant interval changes.    < end of copied text >

## 2025-01-08 NOTE — CHART NOTE - NSCHARTNOTEFT_GEN_A_CORE
PA NOTE-MEDICINE    Called by RN due to Pt having Large BM mixed with BRB.      39y  Female with h/o metastatic breast cancer ER/[place] Neg, HER-2 + on chemotherapy (last dose 12/26/24) (mets to lung, liver, spleen, spine, bone and brain), gastritis w/ intermittent episodes of dark stool (follows w/ Dr. Rosado GI and is on PPI and octreotide daily). Patient presented 1/5 with c/o worsening SOB.  She was seen in ED 1/2/25 for for weakness and SOB at which time she was found to have Hb of 5 requiring PRBC transfusion and positive for Flu A and started on tamiflu and was discharged from the ED on 1/3/25.  Her respiratory status has worsened since then w/ productive cough associated with body aches and chills.  Denies sick contacts but has had many frequent hospital visits. Patient has been afebrile, no leukocytosis. Was placed on BIPAP for Worsening respiratory status. continued on Tamiflu. Vancomycn and Zosyn was added. Blood cultures with MSSA, Vancomycn and Zosyn added.    Pt is s/p 1 U PRBC earlier today 2/2 Chronic anemia.     T(C): 36.8 (08 Jan 2025 19:30), Max: 37.1 (08 Jan 2025 08:27)  T(F): 98.2 (08 Jan 2025 19:30), Max: 98.8 (08 Jan 2025 14:35)  HR: 80 (08 Jan 2025 22:00) (77 - 100)  BP: 98/64 (08 Jan 2025 22:00) (98/64 - 131/80)  BP(mean): 75 (08 Jan 2025 22:00) (75 - 116)  RR: 18 (08 Jan 2025 22:00) (14 - 19)  SpO2: 95% (08 Jan 2025 22:00) (95% - 100%) 3 L nc     Stat CBC ordered then Q 6 Hrs   T & S Stat  1 U PRBCs x 1 Stat  GI consult called for AM     Continue to Monitor Pt  Recall PA for any changes in pt Status  Will Follow Labs  Will sign out to AM Team PA NOTE-MEDICINE    Called by RN due to Pt having Large BM mixed with BRB.      39y  Female with h/o metastatic breast cancer ER/[place] Neg, HER-2 + on chemotherapy (last dose 12/26/24) (mets to lung, liver, spleen, spine, bone and brain), gastritis w/ intermittent episodes of dark stool (follows w/ Dr. Rosado GI and is on PPI and octreotide daily). Patient presented 1/5 with c/o worsening SOB.  She was seen in ED 1/2/25 for for weakness and SOB at which time she was found to have Hb of 5 requiring PRBC transfusion and positive for Flu A and started on tamiflu and was discharged from the ED on 1/3/25.  Her respiratory status has worsened since then w/ productive cough associated with body aches and chills.  Denies sick contacts but has had many frequent hospital visits. Patient has been afebrile, no leukocytosis. Was placed on BIPAP for Worsening respiratory status. continued on Tamiflu. Vancomycn and Zosyn was added. Blood cultures with MSSA, Vancomycn and Zosyn added.    Pt is s/p 1 U PRBC earlier today 2/2 Chronic anemia.     T(C): 36.8 (08 Jan 2025 19:30), Max: 37.1 (08 Jan 2025 08:27)  T(F): 98.2 (08 Jan 2025 19:30), Max: 98.8 (08 Jan 2025 14:35)  HR: 80 (08 Jan 2025 22:00) (77 - 100)  BP: 98/64 (08 Jan 2025 22:00) (98/64 - 131/80)  BP(mean): 75 (08 Jan 2025 22:00) (75 - 116)  RR: 18 (08 Jan 2025 22:00) (14 - 19)  SpO2: 95% (08 Jan 2025 22:00) (95% - 100%) 3 L nc     Stat CBC ordered   T & S Stat  1 U PRBCs x 1 Stat  Repeat CBC ordered for 6 AM with Q 6 Hrs CBC x 4 times   GI consult called for AM     Continue to Monitor Pt  Recall PA for any changes in pt Status  Will Follow Labs  Will sign out to AM Team PA NOTE-MEDICINE    Called by RN due to Pt having Large BM mixed with BRB.      39y  Female with h/o metastatic breast cancer ER/[place] Neg, HER-2 + on chemotherapy (last dose 12/26/24) (mets to lung, liver, spleen, spine, bone and brain), gastritis w/ intermittent episodes of dark stool (follows w/ Dr. Rosado GI and is on PPI and octreotide daily). Patient presented 1/5 with c/o worsening SOB.  She was seen in ED 1/2/25 for for weakness and SOB at which time she was found to have Hb of 5 requiring PRBC transfusion and positive for Flu A and started on tamiflu and was discharged from the ED on 1/3/25.  Her respiratory status has worsened since then w/ productive cough associated with body aches and chills.  Denies sick contacts but has had many frequent hospital visits. Patient has been afebrile, no leukocytosis. Was placed on BIPAP for Worsening respiratory status. continued on Tamiflu. Vancomycn and Zosyn was added. Blood cultures with MSSA, Vancomycn and Zosyn added.    Pt is s/p 1 U PRBC earlier today 2/2 Chronic anemia.     T(C): 36.8 (08 Jan 2025 19:30), Max: 37.1 (08 Jan 2025 08:27)  T(F): 98.2 (08 Jan 2025 19:30), Max: 98.8 (08 Jan 2025 14:35)  HR: 80 (08 Jan 2025 22:00) (77 - 100)  BP: 98/64 (08 Jan 2025 22:00) (98/64 - 131/80)  BP(mean): 75 (08 Jan 2025 22:00) (75 - 116)  RR: 18 (08 Jan 2025 22:00) (14 - 19)  SpO2: 95% (08 Jan 2025 22:00) (95% - 100%) 3 L nc     Stat CBC ordered:                       6.7    11.98 )-----------( 79       ( 09 Jan 2025 00:08 )             21.0     T & S Stat  2 U PRBCs x 1 Stat  Repeat CBC ordered for 6 AM with Q 6 Hrs CBC x 4 times   GI consult called for AM due to active GI Bleed   Continue to Monitor Pt  Recall PA for any changes in pt Status  Will Follow Labs  Will sign out to AM Team

## 2025-01-09 LAB
ACANTHOCYTES BLD QL SMEAR: SLIGHT — SIGNIFICANT CHANGE UP
ALBUMIN SERPL ELPH-MCNC: 2.5 G/DL — LOW (ref 3.3–5.2)
ALP SERPL-CCNC: 238 U/L — HIGH (ref 40–120)
ALT FLD-CCNC: 23 U/L — SIGNIFICANT CHANGE UP
ANION GAP SERPL CALC-SCNC: 9 MMOL/L — SIGNIFICANT CHANGE UP (ref 5–17)
ANISOCYTOSIS BLD QL: SLIGHT — SIGNIFICANT CHANGE UP
ANISOCYTOSIS BLD QL: SLIGHT — SIGNIFICANT CHANGE UP
APTT BLD: 27.4 SEC — SIGNIFICANT CHANGE UP (ref 24.5–35.6)
AST SERPL-CCNC: 24 U/L — SIGNIFICANT CHANGE UP
BASOPHILS # BLD AUTO: 0 K/UL — SIGNIFICANT CHANGE UP (ref 0–0.2)
BASOPHILS NFR BLD AUTO: 0 % — SIGNIFICANT CHANGE UP (ref 0–2)
BILIRUB DIRECT SERPL-MCNC: 1.3 MG/DL — HIGH (ref 0–0.3)
BILIRUB INDIRECT FLD-MCNC: 0.5 MG/DL — SIGNIFICANT CHANGE UP (ref 0.2–1)
BILIRUB SERPL-MCNC: 1.8 MG/DL — SIGNIFICANT CHANGE UP (ref 0.4–2)
BLD GP AB SCN SERPL QL: SIGNIFICANT CHANGE UP
BUN SERPL-MCNC: 21.3 MG/DL — HIGH (ref 8–20)
CALCIUM SERPL-MCNC: 7 MG/DL — LOW (ref 8.4–10.5)
CHLORIDE SERPL-SCNC: 100 MMOL/L — SIGNIFICANT CHANGE UP (ref 96–108)
CO2 SERPL-SCNC: 29 MMOL/L — SIGNIFICANT CHANGE UP (ref 22–29)
CREAT SERPL-MCNC: 0.54 MG/DL — SIGNIFICANT CHANGE UP (ref 0.5–1.3)
CULTURE RESULTS: ABNORMAL
CULTURE RESULTS: ABNORMAL
DACRYOCYTES BLD QL SMEAR: SLIGHT — SIGNIFICANT CHANGE UP
DACRYOCYTES BLD QL SMEAR: SLIGHT — SIGNIFICANT CHANGE UP
EGFR: 121 ML/MIN/1.73M2 — SIGNIFICANT CHANGE UP
EOSINOPHIL # BLD AUTO: 0 K/UL — SIGNIFICANT CHANGE UP (ref 0–0.5)
EOSINOPHIL NFR BLD AUTO: 0 % — SIGNIFICANT CHANGE UP (ref 0–6)
GIANT PLATELETS BLD QL SMEAR: PRESENT — SIGNIFICANT CHANGE UP
GIANT PLATELETS BLD QL SMEAR: PRESENT — SIGNIFICANT CHANGE UP
GLUCOSE BLDC GLUCOMTR-MCNC: 131 MG/DL — HIGH (ref 70–99)
GLUCOSE BLDC GLUCOMTR-MCNC: 162 MG/DL — HIGH (ref 70–99)
GLUCOSE BLDC GLUCOMTR-MCNC: 206 MG/DL — HIGH (ref 70–99)
GLUCOSE SERPL-MCNC: 152 MG/DL — HIGH (ref 70–99)
HCT VFR BLD CALC: 21 % — CRITICAL LOW (ref 34.5–45)
HCT VFR BLD CALC: 24.6 % — LOW (ref 34.5–45)
HCT VFR BLD CALC: 27.5 % — LOW (ref 34.5–45)
HCT VFR BLD CALC: 28.4 % — LOW (ref 34.5–45)
HGB BLD-MCNC: 6.7 G/DL — CRITICAL LOW (ref 11.5–15.5)
HGB BLD-MCNC: 8 G/DL — LOW (ref 11.5–15.5)
HGB BLD-MCNC: 9.2 G/DL — LOW (ref 11.5–15.5)
HGB BLD-MCNC: 9.5 G/DL — LOW (ref 11.5–15.5)
INR BLD: 1.31 RATIO — HIGH (ref 0.85–1.16)
LYMPHOCYTES # BLD AUTO: 0.11 K/UL — LOW (ref 1–3.3)
LYMPHOCYTES # BLD AUTO: 0.9 % — LOW (ref 13–44)
MACROCYTES BLD QL: SLIGHT — SIGNIFICANT CHANGE UP
MANUAL SMEAR VERIFICATION: SIGNIFICANT CHANGE UP
MANUAL SMEAR VERIFICATION: SIGNIFICANT CHANGE UP
MCHC RBC-ENTMCNC: 27.9 PG — SIGNIFICANT CHANGE UP (ref 27–34)
MCHC RBC-ENTMCNC: 28.5 PG — SIGNIFICANT CHANGE UP (ref 27–34)
MCHC RBC-ENTMCNC: 28.8 PG — SIGNIFICANT CHANGE UP (ref 27–34)
MCHC RBC-ENTMCNC: 29 PG — SIGNIFICANT CHANGE UP (ref 27–34)
MCHC RBC-ENTMCNC: 31.9 G/DL — LOW (ref 32–36)
MCHC RBC-ENTMCNC: 32.5 G/DL — SIGNIFICANT CHANGE UP (ref 32–36)
MCHC RBC-ENTMCNC: 33.5 G/DL — SIGNIFICANT CHANGE UP (ref 32–36)
MCHC RBC-ENTMCNC: 33.5 G/DL — SIGNIFICANT CHANGE UP (ref 32–36)
MCV RBC AUTO: 85.7 FL — SIGNIFICANT CHANGE UP (ref 80–100)
MCV RBC AUTO: 86.1 FL — SIGNIFICANT CHANGE UP (ref 80–100)
MCV RBC AUTO: 86.8 FL — SIGNIFICANT CHANGE UP (ref 80–100)
MCV RBC AUTO: 89.4 FL — SIGNIFICANT CHANGE UP (ref 80–100)
METAMYELOCYTES # FLD: 0.9 % — HIGH (ref 0–0)
METAMYELOCYTES # FLD: 0.9 % — HIGH (ref 0–0)
METAMYELOCYTES NFR BLD: 0.9 % — HIGH (ref 0–0)
METAMYELOCYTES NFR BLD: 0.9 % — HIGH (ref 0–0)
MICROCYTES BLD QL: SLIGHT — SIGNIFICANT CHANGE UP
MICROCYTES BLD QL: SLIGHT — SIGNIFICANT CHANGE UP
MONOCYTES # BLD AUTO: 0.33 K/UL — SIGNIFICANT CHANGE UP (ref 0–0.9)
MONOCYTES NFR BLD AUTO: 2.7 % — SIGNIFICANT CHANGE UP (ref 2–14)
MYELOCYTES NFR BLD: 1.8 % — HIGH (ref 0–0)
MYELOCYTES NFR BLD: 2.6 % — HIGH (ref 0–0)
NEUTROPHILS # BLD AUTO: 11.33 K/UL — HIGH (ref 1.8–7.4)
NEUTROPHILS NFR BLD AUTO: 91.9 % — HIGH (ref 43–77)
NEUTS BAND # BLD: 5.3 % — SIGNIFICANT CHANGE UP (ref 0–8)
NEUTS BAND NFR BLD: 5.3 % — SIGNIFICANT CHANGE UP (ref 0–8)
NRBC # BLD: 10 /100 WBCS — HIGH (ref 0–0)
NRBC # BLD: 3 /100 WBCS — HIGH (ref 0–0)
NRBC # BLD: 5 /100 WBCS — HIGH (ref 0–0)
NRBC # BLD: 9 /100 WBCS — HIGH (ref 0–0)
NRBC BLD-RTO: 10 /100 WBCS — HIGH (ref 0–0)
NRBC BLD-RTO: 3 /100 WBCS — HIGH (ref 0–0)
NRBC BLD-RTO: 5 /100 WBCS — HIGH (ref 0–0)
NRBC BLD-RTO: 9 /100 WBCS — HIGH (ref 0–0)
OVALOCYTES BLD QL SMEAR: SLIGHT — SIGNIFICANT CHANGE UP
OVALOCYTES BLD QL SMEAR: SLIGHT — SIGNIFICANT CHANGE UP
PLAT MORPH BLD: ABNORMAL
PLAT MORPH BLD: NORMAL — SIGNIFICANT CHANGE UP
PLATELET # BLD AUTO: 73 K/UL — LOW (ref 150–400)
PLATELET # BLD AUTO: 75 K/UL — LOW (ref 150–400)
PLATELET # BLD AUTO: 76 K/UL — LOW (ref 150–400)
PLATELET # BLD AUTO: 79 K/UL — LOW (ref 150–400)
POIKILOCYTOSIS BLD QL AUTO: SLIGHT — SIGNIFICANT CHANGE UP
POLYCHROMASIA BLD QL SMEAR: SIGNIFICANT CHANGE UP
POLYCHROMASIA BLD QL SMEAR: SLIGHT — SIGNIFICANT CHANGE UP
POTASSIUM SERPL-MCNC: 3.5 MMOL/L — SIGNIFICANT CHANGE UP (ref 3.5–5.3)
POTASSIUM SERPL-SCNC: 3.5 MMOL/L — SIGNIFICANT CHANGE UP (ref 3.5–5.3)
PROMYELOCYTES # FLD: 0.9 % — HIGH (ref 0–0)
PROMYELOCYTES NFR BLD: 0.9 % — HIGH (ref 0–0)
PROT SERPL-MCNC: 5 G/DL — LOW (ref 6.6–8.7)
PROTHROM AB SERPL-ACNC: 15.2 SEC — HIGH (ref 9.9–13.4)
RBC # BLD: 2.35 M/UL — LOW (ref 3.8–5.2)
RBC # BLD: 2.87 M/UL — LOW (ref 3.8–5.2)
RBC # BLD: 3.17 M/UL — LOW (ref 3.8–5.2)
RBC # BLD: 3.3 M/UL — LOW (ref 3.8–5.2)
RBC # FLD: 23.1 % — HIGH (ref 10.3–14.5)
RBC # FLD: 23.1 % — HIGH (ref 10.3–14.5)
RBC # FLD: 23.3 % — HIGH (ref 10.3–14.5)
RBC # FLD: 23.4 % — HIGH (ref 10.3–14.5)
RBC BLD AUTO: ABNORMAL
RBC BLD AUTO: ABNORMAL
SMUDGE CELLS # BLD: PRESENT — SIGNIFICANT CHANGE UP
SODIUM SERPL-SCNC: 138 MMOL/L — SIGNIFICANT CHANGE UP (ref 135–145)
SPECIMEN SOURCE: SIGNIFICANT CHANGE UP
SPECIMEN SOURCE: SIGNIFICANT CHANGE UP
TARGETS BLD QL SMEAR: SLIGHT — SIGNIFICANT CHANGE UP
VARIANT LYMPHS # BLD: 1.8 % — SIGNIFICANT CHANGE UP (ref 0–6)
VARIANT LYMPHS NFR BLD MANUAL: 1.8 % — SIGNIFICANT CHANGE UP (ref 0–6)
WBC # BLD: 11.98 K/UL — HIGH (ref 3.8–10.5)
WBC # BLD: 12.33 K/UL — HIGH (ref 3.8–10.5)
WBC # BLD: 12.45 K/UL — HIGH (ref 3.8–10.5)
WBC # BLD: 13.35 K/UL — HIGH (ref 3.8–10.5)
WBC # FLD AUTO: 11.98 K/UL — HIGH (ref 3.8–10.5)
WBC # FLD AUTO: 12.33 K/UL — HIGH (ref 3.8–10.5)
WBC # FLD AUTO: 12.45 K/UL — HIGH (ref 3.8–10.5)
WBC # FLD AUTO: 13.35 K/UL — HIGH (ref 3.8–10.5)

## 2025-01-09 PROCEDURE — 99222 1ST HOSP IP/OBS MODERATE 55: CPT

## 2025-01-09 PROCEDURE — 99232 SBSQ HOSP IP/OBS MODERATE 35: CPT

## 2025-01-09 PROCEDURE — 71250 CT THORAX DX C-: CPT | Mod: 26

## 2025-01-09 PROCEDURE — G0545: CPT

## 2025-01-09 PROCEDURE — 99233 SBSQ HOSP IP/OBS HIGH 50: CPT

## 2025-01-09 RX ORDER — IPRATROPIUM BROMIDE AND ALBUTEROL SULFATE .5; 2.5 MG/3ML; MG/3ML
3 SOLUTION RESPIRATORY (INHALATION) ONCE
Refills: 0 | Status: COMPLETED | OUTPATIENT
Start: 2025-01-09 | End: 2025-01-09

## 2025-01-09 RX ORDER — METHYLPREDNISOLONE ACETATE 40 MG/ML
40 VIAL (ML) INJECTION EVERY 24 HOURS
Refills: 0 | Status: DISCONTINUED | OUTPATIENT
Start: 2025-01-09 | End: 2025-01-14

## 2025-01-09 RX ORDER — DIPHENHYDRAMINE HCL 25 MG
25 CAPSULE ORAL ONCE
Refills: 0 | Status: COMPLETED | OUTPATIENT
Start: 2025-01-09 | End: 2025-01-09

## 2025-01-09 RX ORDER — HYDROMORPHONE HYDROCHLORIDE 4 MG/ML
30 INJECTION, SOLUTION INTRAMUSCULAR; INTRAVENOUS; SUBCUTANEOUS
Refills: 0 | Status: DISCONTINUED | OUTPATIENT
Start: 2025-01-09 | End: 2025-01-13

## 2025-01-09 RX ADMIN — NAFCILLIN INJECTION 200 GRAM(S): 2 POWDER, FOR SOLUTION INTRAMUSCULAR; INTRAMUSCULAR; INTRAVENOUS at 22:01

## 2025-01-09 RX ADMIN — OSELTAMIVIR PHOSPHATE 75 MILLIGRAM(S): 75 CAPSULE ORAL at 19:11

## 2025-01-09 RX ADMIN — PREGABALIN CAPSULES, CV 200 MILLIGRAM(S): 225 CAPSULE ORAL at 05:25

## 2025-01-09 RX ADMIN — METHADONE HYDROCHLORIDE 5 MILLIGRAM(S): 5 SOLUTION ORAL at 05:24

## 2025-01-09 RX ADMIN — PREGABALIN CAPSULES, CV 200 MILLIGRAM(S): 225 CAPSULE ORAL at 21:30

## 2025-01-09 RX ADMIN — DEXTROMETHORPHAN HBR AND GUAIFENESIN ORAL SOLUTION 10 MILLILITER(S): 10; 100 LIQUID ORAL at 05:24

## 2025-01-09 RX ADMIN — HYDROMORPHONE HYDROCHLORIDE 30 MILLILITER(S): 4 INJECTION, SOLUTION INTRAMUSCULAR; INTRAVENOUS; SUBCUTANEOUS at 07:32

## 2025-01-09 RX ADMIN — OCTREOTIDE ACETATE 100 MICROGRAM(S): 1000 INJECTION INTRAVENOUS; SUBCUTANEOUS at 19:08

## 2025-01-09 RX ADMIN — OCTREOTIDE ACETATE 100 MICROGRAM(S): 1000 INJECTION INTRAVENOUS; SUBCUTANEOUS at 05:26

## 2025-01-09 RX ADMIN — Medication 1.25 MILLIGRAM(S): at 04:21

## 2025-01-09 RX ADMIN — Medication 1 APPLICATION(S): at 19:12

## 2025-01-09 RX ADMIN — METHADONE HYDROCHLORIDE 5 MILLIGRAM(S): 5 SOLUTION ORAL at 14:55

## 2025-01-09 RX ADMIN — HYDROMORPHONE HYDROCHLORIDE 30 MILLILITER(S): 4 INJECTION, SOLUTION INTRAMUSCULAR; INTRAVENOUS; SUBCUTANEOUS at 20:30

## 2025-01-09 RX ADMIN — Medication 1: at 13:04

## 2025-01-09 RX ADMIN — PANTOPRAZOLE 40 MILLIGRAM(S): 20 TABLET, DELAYED RELEASE ORAL at 19:11

## 2025-01-09 RX ADMIN — METHADONE HYDROCHLORIDE 10 MILLIGRAM(S): 5 SOLUTION ORAL at 21:30

## 2025-01-09 RX ADMIN — HYDROMORPHONE HYDROCHLORIDE 30 MILLILITER(S): 4 INJECTION, SOLUTION INTRAMUSCULAR; INTRAVENOUS; SUBCUTANEOUS at 15:15

## 2025-01-09 RX ADMIN — Medication 40 MILLIGRAM(S): at 05:25

## 2025-01-09 RX ADMIN — Medication 4 MILLILITER(S): at 08:46

## 2025-01-09 RX ADMIN — OSELTAMIVIR PHOSPHATE 75 MILLIGRAM(S): 75 CAPSULE ORAL at 05:25

## 2025-01-09 RX ADMIN — PANTOPRAZOLE 40 MILLIGRAM(S): 20 TABLET, DELAYED RELEASE ORAL at 05:24

## 2025-01-09 RX ADMIN — Medication 1.25 MILLIGRAM(S): at 08:46

## 2025-01-09 RX ADMIN — Medication 4 MILLILITER(S): at 04:21

## 2025-01-09 RX ADMIN — PREGABALIN CAPSULES, CV 200 MILLIGRAM(S): 225 CAPSULE ORAL at 14:55

## 2025-01-09 RX ADMIN — Medication 25 MILLIGRAM(S): at 09:56

## 2025-01-09 RX ADMIN — Medication 4 MILLILITER(S): at 15:13

## 2025-01-09 RX ADMIN — NAFCILLIN INJECTION 200 GRAM(S): 2 POWDER, FOR SOLUTION INTRAMUSCULAR; INTRAMUSCULAR; INTRAVENOUS at 03:04

## 2025-01-09 RX ADMIN — NAFCILLIN INJECTION 200 GRAM(S): 2 POWDER, FOR SOLUTION INTRAMUSCULAR; INTRAMUSCULAR; INTRAVENOUS at 06:39

## 2025-01-09 RX ADMIN — ANTISEPTIC SURGICAL SCRUB 1 APPLICATION(S): 0.04 SOLUTION TOPICAL at 14:56

## 2025-01-09 RX ADMIN — ONDANSETRON 4 MILLIGRAM(S): 4 TABLET, ORALLY DISINTEGRATING ORAL at 11:15

## 2025-01-09 RX ADMIN — Medication 1.25 MILLIGRAM(S): at 21:49

## 2025-01-09 RX ADMIN — Medication 2: at 09:57

## 2025-01-09 RX ADMIN — NAFCILLIN INJECTION 200 GRAM(S): 2 POWDER, FOR SOLUTION INTRAMUSCULAR; INTRAMUSCULAR; INTRAVENOUS at 14:56

## 2025-01-09 RX ADMIN — NAFCILLIN INJECTION 200 GRAM(S): 2 POWDER, FOR SOLUTION INTRAMUSCULAR; INTRAMUSCULAR; INTRAVENOUS at 11:16

## 2025-01-09 RX ADMIN — Medication 4 MILLILITER(S): at 21:49

## 2025-01-09 RX ADMIN — Medication 1 APPLICATION(S): at 05:26

## 2025-01-09 RX ADMIN — Medication 1.25 MILLIGRAM(S): at 15:13

## 2025-01-09 RX ADMIN — NAFCILLIN INJECTION 200 GRAM(S): 2 POWDER, FOR SOLUTION INTRAMUSCULAR; INTRAMUSCULAR; INTRAVENOUS at 19:11

## 2025-01-09 NOTE — PROGRESS NOTE ADULT - ASSESSMENT
38 y/o female with h/o metastatic breast cancer , mets to brain. MRI brain shows new hemorrhagic mets. Pain management consulted for pain management.    Plan:  - C/w dPCA 0/0.3/8/6  - C/w home methadone 5/5/10    When due for discharge:  -DC PCA  - Recommend starting dilaudid PO 4mg/8mg [id number] PRN mod/severe pain     40 y/o female with h/o metastatic breast cancer , mets to brain. MRI brain shows new hemorrhagic mets. Pain management consulted for pain management.    Plan:  - dPCA 0/0.35/8/6 (increase demand dose to 0.35 mg)  - C/w home methadone 5/5/10    When due for discharge:  - DC PCA  - Recommend starting dilaudid PO 4mg/8mg [id number] PRN mod/severe pain

## 2025-01-09 NOTE — CHART NOTE - NSCHARTNOTEFT_GEN_A_CORE
Pulm    Doin well on nasal canula  continue to use bipap as needed at night, no hypercapnea, do not think patient will need NIV upon discharge  can taper steroid to methylpred 40 mg IV daily today, and can taper to prednisone 40 mg daily tomorrow and decrease by 10 mg q2 days  continue nebs as needed  continue antibiotic per ID  pulm will sign off, please call for any question Pulm    Doin well on nasal canula  CT scan from 1/5, 1/8/1/9 have been reviewed   left pleural effusion and interlobular thickening definitely improved, likely reflected her fluid status  consolidative infiltrates are stalbe, but increased GGOs.  May be related to fluid status given patient has been receiving blood transfusion, and may also be related to the infectious process given patient still bacteremic  would continue gentle diuresis for 2 more days  continue to use bipap as needed at night, no hypercapnea, do not think patient will need NIV upon discharge  can taper steroid to methylpred 40 mg IV daily today, and can taper to prednisone 40 mg daily tomorrow and decrease by 10 mg q2 days  continue nebs as needed  continue antibiotic per ID  Please call back for any question, otherwise pulmonary will follow on Monday 1/13

## 2025-01-09 NOTE — CONSULT NOTE ADULT - NS ATTEND AMEND GEN_ALL_CORE FT
The patient who has metastatic breast cancer and has multiple procedures performed in the past and has been admitted with anemia and flu.  She is having rectal bleeding.  The patient had colonoscopy recently which has only revealed internal hemorrhoids.  At this time, the possibility of any being any other source of bleeding is low except for there can be colitis related to her recent therapy.  At this time we will recommend to put her on empiric antibiotics and observe.  If she continues to bleed, will recommend to consider CT abdomen and pelvis.  No plans for any colonoscopy.

## 2025-01-09 NOTE — PROGRESS NOTE ADULT - SUBJECTIVE AND OBJECTIVE BOX
39y  Female with h/o metastatic breast cancer ER/[place] Neg, HER-2 + on chemotherapy (last dose 12/26/24) (mets to lung, liver, spleen, spine, bone and brain), gastritis w/ intermittent episodes of dark stool (follows w/ Dr. Rosado GI and is on PPI and octreotide daily). Patient presented 1/5 with c/o worsening SOB.  She was seen in ED 1/2/25 for for weakness and SOB at which time she was found to have Hb of 5 requiring PRBC transfusion and positive for Flu A and started on tamiflu and was discharged from the ED on 1/3/25.  Her respiratory status has worsened since then w/ productive cough associated with body aches and chills.  Denies sick contacts but has had many frequent hospital visits. Patient has been afebrile, no leukocytosis. Was placed on BIPAP for Worsening respiratory status. continued on Tamiflu. Vancomycn and Zosyn was added. Blood cultures with MSSA.     1/9/25 Gi evaluated for large bloody bowel movement  Suspect rectal bleeding is due to diverticular bleed which are self-limiting     MEDICATIONS  (STANDING):  acetylcysteine 10%  Inhalation 4 milliLiter(s) Inhalation every 6 hours  chlorhexidine 2% Cloths 1 Application(s) Topical daily  dextrose 5%. 1000 milliLiter(s) (50 mL/Hr) IV Continuous <Continuous>  dextrose 5%. 1000 milliLiter(s) (100 mL/Hr) IV Continuous <Continuous>  dextrose 50% Injectable 25 Gram(s) IV Push once  dextrose 50% Injectable 12.5 Gram(s) IV Push once  dextrose 50% Injectable 25 Gram(s) IV Push once  glucagon  Injectable 1 milliGRAM(s) IntraMuscular once  hydrocortisone 1% Cream 1 Application(s) Topical two times a day  HYDROmorphone PCA (1 mG/mL) 30 milliLiter(s) PCA Continuous PCA Continuous  insulin lispro (ADMELOG) corrective regimen sliding scale   SubCutaneous three times a day before meals  levalbuterol Inhalation 1.25 milliGRAM(s) Inhalation every 6 hours  methadone    Tablet 10 milliGRAM(s) Oral at bedtime  methadone    Tablet 5 milliGRAM(s) Oral <User Schedule>  methylPREDNISolone sodium succinate Injectable 40 milliGRAM(s) IV Push every 8 hours  nafcillin  IVPB 2 Gram(s) IV Intermittent every 4 hours  octreotide  Injectable 100 MICROGram(s) SubCutaneous two times a day  oseltamivir 75 milliGRAM(s) Oral two times a day  pantoprazole  Injectable 40 milliGRAM(s) IV Push every 12 hours  pregabalin 200 milliGRAM(s) Oral every 8 hours    MEDICATIONS  (PRN):  acetaminophen     Tablet .. 650 milliGRAM(s) Oral every 6 hours PRN Temp greater or equal to 38C (100.4F), Mild Pain (1 - 3)  aluminum hydroxide/magnesium hydroxide/simethicone Suspension 30 milliLiter(s) Oral every 4 hours PRN Dyspepsia  benzonatate 100 milliGRAM(s) Oral every 8 hours PRN Cough  dextrose Oral Gel 15 Gram(s) Oral once PRN Blood Glucose LESS THAN 70 milliGRAM(s)/deciliter  guaifenesin/dextromethorphan Oral Liquid 10 milliLiter(s) Oral every 4 hours PRN Cough  melatonin 3 milliGRAM(s) Oral at bedtime PRN Insomnia  naloxone Injectable 0.1 milliGRAM(s) IV Push every 3 minutes PRN For ANY of the following changes in patient status:  A. RR LESS THAN 10 breaths per minute, B. Oxygen saturation LESS THAN 90%, C. Sedation score of 6  ondansetron Injectable 4 milliGRAM(s) IV Push every 8 hours PRN Nausea and/or Vomiting    Vital Signs Last 24 Hrs  T(C): 37.1 (09 Jan 2025 08:38), Max: 37.1 (08 Jan 2025 14:35)  T(F): 98.8 (09 Jan 2025 08:38), Max: 98.8 (08 Jan 2025 14:35)  HR: 78 (09 Jan 2025 08:47) (71 - 100)  BP: 118/99 (09 Jan 2025 08:40) (98/61 - 131/80)  BP(mean): 106 (09 Jan 2025 08:40) (73 - 116)  RR: 19 (09 Jan 2025 08:40) (18 - 19)  SpO2: 98% (09 Jan 2025 08:47) (94% - 100%)    Parameters below as of 09 Jan 2025 08:47  Patient On (Oxygen Delivery Method): nasal cannula, 3 lpm      Parameters below as of 08 Jan 2025 12:00  Patient On (Oxygen Delivery Method): nasal cannula  O2 Flow (L/min): 4  PE:  NAD  On O2 NC  bilateral rales  abd soft, nd, nt  a/o x 3      CBC                          8.0    12.33 )-----------( 73       ( 09 Jan 2025 04:57 )             24.6                           6.9    11.64 )-----------( 102      ( 08 Jan 2025 06:20 )             22.0       CBC:            7.1    6.03  )-----------( 70       ( 01-07-25 @ 06:15 )             22.1       Chem:       01-09    138  |  100  |  21.3[H]  ----------------------------<  152[H]  3.5   |  29.0  |  0.54    Ca    7.0[L]      09 Jan 2025 04:57  Mg     1.9     01-08    TPro  5.0[L]  /  Alb  2.5[L]  /  TBili  1.8  /  DBili  1.3[H]  /  AST  24  /  ALT  23  /  AlkPhos  238[H]  01-09 01-08    140  |  103  |  17.8  ----------------------------<  112[H]  3.5   |  27.0  |  0.56    Ca    6.8[L]      08 Jan 2025 06:20  Mg     1.9     01-08    TPro  5.4[L]  /  Alb  2.5[L]  /  TBili  1.9  /  DBili  1.3[H]  /  AST  24  /  ALT  25  /  AlkPhos  240[H]  01-08          ( 01-07-25 @ 06:15 )    139  |  106  |  17.5  ----------------------------<  394[H]  3.3[L]   |  20.0[L]  |  0.61    Liver Functions: ( 01-07-25 @ 06:15 )  Alb: 2.6 g/dL / Pro: 5.5 g/dL / ALK PHOS: 228 U/L / ALT: 22 U/L / AST: 16 U/L / GGT: x            Allergies  pertuzumab (Other (Severe))  Perjeta (Other (Severe))       REVIEW OF SYSTEMS:  Limited, patient is on BIPAP  no complaints        39y  Female with h/o metastatic breast cancer ER/[place] Neg, HER-2 + on chemotherapy (last dose 12/26/24) (mets to lung, liver, spleen, spine, bone and brain), gastritis w/ intermittent episodes of dark stool (follows w/ Dr. Rosado GI and is on PPI and octreotide daily). Patient presented 1/5 with c/o worsening SOB.  She was seen in ED 1/2/25 for for weakness and SOB at which time she was found to have Hb of 5 requiring PRBC transfusion and positive for Flu A and started on tamiflu and was discharged from the ED on 1/3/25.  Her respiratory status has worsened since then w/ productive cough associated with body aches and chills.  Denies sick contacts but has had many frequent hospital visits. Patient has been afebrile, no leukocytosis. Was placed on BIPAP for Worsening respiratory status. continued on Tamiflu. Vancomycn and Zosyn was added. Blood cultures with MSSA.     1/9/25 GI evaluated for large bloody bowel movements  Suspect rectal bleeding is due to diverticular bleed which are self-limiting S/P 3 UPRBC     MEDICATIONS  (STANDING):  acetylcysteine 10%  Inhalation 4 milliLiter(s) Inhalation every 6 hours  chlorhexidine 2% Cloths 1 Application(s) Topical daily  dextrose 5%. 1000 milliLiter(s) (50 mL/Hr) IV Continuous <Continuous>  dextrose 5%. 1000 milliLiter(s) (100 mL/Hr) IV Continuous <Continuous>  dextrose 50% Injectable 25 Gram(s) IV Push once  dextrose 50% Injectable 12.5 Gram(s) IV Push once  dextrose 50% Injectable 25 Gram(s) IV Push once  glucagon  Injectable 1 milliGRAM(s) IntraMuscular once  hydrocortisone 1% Cream 1 Application(s) Topical two times a day  HYDROmorphone PCA (1 mG/mL) 30 milliLiter(s) PCA Continuous PCA Continuous  insulin lispro (ADMELOG) corrective regimen sliding scale   SubCutaneous three times a day before meals  levalbuterol Inhalation 1.25 milliGRAM(s) Inhalation every 6 hours  methadone    Tablet 10 milliGRAM(s) Oral at bedtime  methadone    Tablet 5 milliGRAM(s) Oral <User Schedule>  methylPREDNISolone sodium succinate Injectable 40 milliGRAM(s) IV Push every 8 hours  nafcillin  IVPB 2 Gram(s) IV Intermittent every 4 hours  octreotide  Injectable 100 MICROGram(s) SubCutaneous two times a day  oseltamivir 75 milliGRAM(s) Oral two times a day  pantoprazole  Injectable 40 milliGRAM(s) IV Push every 12 hours  pregabalin 200 milliGRAM(s) Oral every 8 hours    MEDICATIONS  (PRN):  acetaminophen     Tablet .. 650 milliGRAM(s) Oral every 6 hours PRN Temp greater or equal to 38C (100.4F), Mild Pain (1 - 3)  aluminum hydroxide/magnesium hydroxide/simethicone Suspension 30 milliLiter(s) Oral every 4 hours PRN Dyspepsia  benzonatate 100 milliGRAM(s) Oral every 8 hours PRN Cough  dextrose Oral Gel 15 Gram(s) Oral once PRN Blood Glucose LESS THAN 70 milliGRAM(s)/deciliter  guaifenesin/dextromethorphan Oral Liquid 10 milliLiter(s) Oral every 4 hours PRN Cough  melatonin 3 milliGRAM(s) Oral at bedtime PRN Insomnia  naloxone Injectable 0.1 milliGRAM(s) IV Push every 3 minutes PRN For ANY of the following changes in patient status:  A. RR LESS THAN 10 breaths per minute, B. Oxygen saturation LESS THAN 90%, C. Sedation score of 6  ondansetron Injectable 4 milliGRAM(s) IV Push every 8 hours PRN Nausea and/or Vomiting    Vital Signs Last 24 Hrs  T(C): 37.1 (09 Jan 2025 08:38), Max: 37.1 (08 Jan 2025 14:35)  T(F): 98.8 (09 Jan 2025 08:38), Max: 98.8 (08 Jan 2025 14:35)  HR: 78 (09 Jan 2025 08:47) (71 - 100)  BP: 118/99 (09 Jan 2025 08:40) (98/61 - 131/80)  BP(mean): 106 (09 Jan 2025 08:40) (73 - 116)  RR: 19 (09 Jan 2025 08:40) (18 - 19)  SpO2: 98% (09 Jan 2025 08:47) (94% - 100%)    Parameters below as of 09 Jan 2025 08:47  Patient On (Oxygen Delivery Method): nasal cannula, 3 lpm      Parameters below as of 08 Jan 2025 12:00  Patient On (Oxygen Delivery Method): nasal cannula  O2 Flow (L/min): 4  PE:  NAD  On O2 NC  bilateral rales  abd soft, nd, nt  a/o x 3      CBC                          8.0    12.33 )-----------( 73       ( 09 Jan 2025 04:57 )             24.6                           6.9    11.64 )-----------( 102      ( 08 Jan 2025 06:20 )             22.0       CBC:            7.1    6.03  )-----------( 70       ( 01-07-25 @ 06:15 )             22.1       Chem:       01-09    138  |  100  |  21.3[H]  ----------------------------<  152[H]  3.5   |  29.0  |  0.54    Ca    7.0[L]      09 Jan 2025 04:57  Mg     1.9     01-08    TPro  5.0[L]  /  Alb  2.5[L]  /  TBili  1.8  /  DBili  1.3[H]  /  AST  24  /  ALT  23  /  AlkPhos  238[H]  01-09 01-08    140  |  103  |  17.8  ----------------------------<  112[H]  3.5   |  27.0  |  0.56    Ca    6.8[L]      08 Jan 2025 06:20  Mg     1.9     01-08    TPro  5.4[L]  /  Alb  2.5[L]  /  TBili  1.9  /  DBili  1.3[H]  /  AST  24  /  ALT  25  /  AlkPhos  240[H]  01-08          ( 01-07-25 @ 06:15 )    139  |  106  |  17.5  ----------------------------<  394[H]  3.3[L]   |  20.0[L]  |  0.61    Liver Functions: ( 01-07-25 @ 06:15 )  Alb: 2.6 g/dL / Pro: 5.5 g/dL / ALK PHOS: 228 U/L / ALT: 22 U/L / AST: 16 U/L / GGT: x            Allergies  pertuzumab (Other (Severe))  Perjeta (Other (Severe))       REVIEW OF SYSTEMS:  Limited, patient is on BIPAP  no complaints

## 2025-01-09 NOTE — PROGRESS NOTE ADULT - SUBJECTIVE AND OBJECTIVE BOX
NATIVIDAD MYERS  [id number]    Chief Complaint:  follow up Bacteremia    Interval History:  drop in H/H overnight, given PRBC x 2                acetaminophen     Tablet .. 650 milliGRAM(s) Oral every 6 hours PRN  acetylcysteine 10%  Inhalation 4 milliLiter(s) Inhalation every 6 hours  albuterol/ipratropium for Nebulization. 3 milliLiter(s) Nebulizer once  aluminum hydroxide/magnesium hydroxide/simethicone Suspension 30 milliLiter(s) Oral every 4 hours PRN  benzonatate 100 milliGRAM(s) Oral every 8 hours PRN  chlorhexidine 2% Cloths 1 Application(s) Topical daily  dextrose 5%. 1000 milliLiter(s) IV Continuous <Continuous>  dextrose 5%. 1000 milliLiter(s) IV Continuous <Continuous>  dextrose 50% Injectable 25 Gram(s) IV Push once  dextrose 50% Injectable 25 Gram(s) IV Push once  dextrose 50% Injectable 12.5 Gram(s) IV Push once  dextrose Oral Gel 15 Gram(s) Oral once PRN  diphenhydrAMINE Injectable 25 milliGRAM(s) IV Push once  glucagon  Injectable 1 milliGRAM(s) IntraMuscular once  guaifenesin/dextromethorphan Oral Liquid 10 milliLiter(s) Oral every 4 hours PRN  hydrocortisone 1% Cream 1 Application(s) Topical two times a day  HYDROmorphone PCA (1 mG/mL) 30 milliLiter(s) PCA Continuous PCA Continuous  insulin lispro (ADMELOG) corrective regimen sliding scale   SubCutaneous three times a day before meals  levalbuterol Inhalation 1.25 milliGRAM(s) Inhalation every 6 hours  melatonin 3 milliGRAM(s) Oral at bedtime PRN  methadone    Tablet 10 milliGRAM(s) Oral at bedtime  methadone    Tablet 5 milliGRAM(s) Oral <User Schedule>  methylPREDNISolone sodium succinate Injectable 40 milliGRAM(s) IV Push every 8 hours  nafcillin  IVPB 2 Gram(s) IV Intermittent every 4 hours  naloxone Injectable 0.1 milliGRAM(s) IV Push every 3 minutes PRN  octreotide  Injectable 100 MICROGram(s) SubCutaneous two times a day  ondansetron Injectable 4 milliGRAM(s) IV Push every 8 hours PRN  oseltamivir 75 milliGRAM(s) Oral two times a day  pantoprazole  Injectable 40 milliGRAM(s) IV Push every 12 hours  pregabalin 200 milliGRAM(s) Oral every 8 hours          Physical Exam:  T(C): 37.1 (01-09-25 @ 08:38), Max: 37.1 (01-08-25 @ 14:35)  HR: 87 (01-09-25 @ 06:00) (71 - 100)  BP: 115/92 (01-09-25 @ 06:00) (98/61 - 131/80)  RR: 18 (01-09-25 @ 06:00) (18 - 19)  SpO2: 94% (01-09-25 @ 06:00) (94% - 100%)  Wt(kg): --  General: Comfortable in NAD  HEENT: MMM, sclera anicteric  Resp: CTA bilaterally  CVS: nl s1s2, rrr, no s3/JVD  Abd: soft, NT, ND, BS+  Ext: No c/c/e  Neuro A&O x3  Psych: Normal affect    I&O's Summary    08 Jan 2025 07:01  -  09 Jan 2025 07:00  --------------------------------------------------------  IN: 1292 mL / OUT: 800 mL / NET: 492 mL          Labs:   09 Jan 2025 04:57    138    |  100    |  21.3   ----------------------------<  152    3.5     |  29.0   |  0.54     Ca    7.0        09 Jan 2025 04:57  Mg     1.9       08 Jan 2025 06:20    TPro  5.0    /  Alb  2.5    /  TBili  1.8    /  DBili  1.3    /  AST  24     /  ALT  23     /  AlkPhos  238    09 Jan 2025 04:57                          8.0    12.33 )-----------( 73       ( 09 Jan 2025 04:57 )             24.6     PT/INR - ( 09 Jan 2025 04:57 )   PT: 15.2 sec;   INR: 1.31 ratio         PTT - ( 09 Jan 2025 04:57 )  PTT:27.4 sec        ECHO: < from: TTE Limited W or WO Ultrasound Enhancing Agent (01.06.25 @ 12:55) >  1. Technically difficult image quality.   2. Left ventricular systolic function is normal with an ejection fraction visually estimated at 65 to 70 %.   3. Normal right ventricular cavity size, with normal wall thickness, and normal right ventricular systolic function.   4. Compared to the transthoracic echocardiogram performed on 7/21/2024, there have been no significant interval changes        < from: TTE W or WO Ultrasound Enhancing Agent (07.21.24 @ 15:40) >  1. Left ventricular cavity is normal in size. Left ventricular wall thickness is normal. Left ventricular systolic function is normal with an ejection fraction visually estimated at 55 to 60 %.   2. Normal left ventricular diastolic function.   3. Normal right ventricular cavity size and normal right ventricular systolic function.   4. The left atrium is normal in size.   5. The right atrium is normal in size.   6. Trileaflet aortic valve with normal systolic excursion.   7. Structurally normal mitral valve with normal leaflet excursion.   8. Trace mitral regurgitation.   9. Mild tricuspid regurgitation.  10. Estimated pulmonary artery systolic pressure is 23 mmHg, normal pulmonary artery pressure.  11. The interatrial septum appears intact.  12. No pericardial effusion seen.          Assessment:  39y  Female with h/o metastatic breast cancer ER/AK Neg, HER-2 + on chemotherapy (last dose 12/26/24) (mets to lung, liver, spleen, spine, bone and brain), gastritis w/ intermittent episodes of dark stool (follows w/ Dr. Rosado GI and is on PPI and octreotide daily). Patient presented 1/5 with c/o worsening SOB.  She was seen in ED 1/2/25 for for weakness and SOB at which time she was found to have Hb of 5 requiring PRBC transfusion and positive for Flu A and started on tamiflu and was discharged from the ED on 1/3/25.  Her respiratory status has worsened since then w/ productive cough associated with body aches and chills.  Denies sick contacts but has had many frequent hospital visits. Patient has been afebrile, no leukocytosis. Currently on HFNC for worsening respiratory status. continued on Tamiflu. Vancomycn and Zosyn was added. Blood cultures with MSSA.  Cardiology consultation requested for evaluation of endocarditis.   -Patient with GIB overnight requiring transfusion and drop in Hgb to 6.7  -MARYANN contraindicated now with active GIB    Plan: (TO BE SEEN)  1. Further evaluation by GI, CV stable for EGD/colo  2. No MARYANN at this time until stabilizes and source of bleed ascertained  3.         Zhang Dunn MD NATIVIDAD MYERS  [id number]    Chief Complaint:  follow up Bacteremia    Interval History:  drop in H/H overnight, given PRBC x 2                acetaminophen     Tablet .. 650 milliGRAM(s) Oral every 6 hours PRN  acetylcysteine 10%  Inhalation 4 milliLiter(s) Inhalation every 6 hours  albuterol/ipratropium for Nebulization. 3 milliLiter(s) Nebulizer once  aluminum hydroxide/magnesium hydroxide/simethicone Suspension 30 milliLiter(s) Oral every 4 hours PRN  benzonatate 100 milliGRAM(s) Oral every 8 hours PRN  chlorhexidine 2% Cloths 1 Application(s) Topical daily  dextrose 5%. 1000 milliLiter(s) IV Continuous <Continuous>  dextrose 5%. 1000 milliLiter(s) IV Continuous <Continuous>  dextrose 50% Injectable 25 Gram(s) IV Push once  dextrose 50% Injectable 25 Gram(s) IV Push once  dextrose 50% Injectable 12.5 Gram(s) IV Push once  dextrose Oral Gel 15 Gram(s) Oral once PRN  diphenhydrAMINE Injectable 25 milliGRAM(s) IV Push once  glucagon  Injectable 1 milliGRAM(s) IntraMuscular once  guaifenesin/dextromethorphan Oral Liquid 10 milliLiter(s) Oral every 4 hours PRN  hydrocortisone 1% Cream 1 Application(s) Topical two times a day  HYDROmorphone PCA (1 mG/mL) 30 milliLiter(s) PCA Continuous PCA Continuous  insulin lispro (ADMELOG) corrective regimen sliding scale   SubCutaneous three times a day before meals  levalbuterol Inhalation 1.25 milliGRAM(s) Inhalation every 6 hours  melatonin 3 milliGRAM(s) Oral at bedtime PRN  methadone    Tablet 10 milliGRAM(s) Oral at bedtime  methadone    Tablet 5 milliGRAM(s) Oral <User Schedule>  methylPREDNISolone sodium succinate Injectable 40 milliGRAM(s) IV Push every 8 hours  nafcillin  IVPB 2 Gram(s) IV Intermittent every 4 hours  naloxone Injectable 0.1 milliGRAM(s) IV Push every 3 minutes PRN  octreotide  Injectable 100 MICROGram(s) SubCutaneous two times a day  ondansetron Injectable 4 milliGRAM(s) IV Push every 8 hours PRN  oseltamivir 75 milliGRAM(s) Oral two times a day  pantoprazole  Injectable 40 milliGRAM(s) IV Push every 12 hours  pregabalin 200 milliGRAM(s) Oral every 8 hours          Physical Exam:  T(C): 37.1 (01-09-25 @ 08:38), Max: 37.1 (01-08-25 @ 14:35)  HR: 87 (01-09-25 @ 06:00) (71 - 100)  BP: 115/92 (01-09-25 @ 06:00) (98/61 - 131/80)  RR: 18 (01-09-25 @ 06:00) (18 - 19)  SpO2: 94% (01-09-25 @ 06:00) (94% - 100%)  Wt(kg): --  General: Comfortable in NAD  HEENT: MMM, sclera anicteric  Resp: CTA bilaterally  CVS: nl s1s2, rrr, no s3/JVD  Abd: soft, NT, ND, BS+  Ext: No c/c/e  Neuro A&O x3  Psych: Normal affect    I&O's Summary    08 Jan 2025 07:01  -  09 Jan 2025 07:00  --------------------------------------------------------  IN: 1292 mL / OUT: 800 mL / NET: 492 mL          Labs:   09 Jan 2025 04:57    138    |  100    |  21.3   ----------------------------<  152    3.5     |  29.0   |  0.54     Ca    7.0        09 Jan 2025 04:57  Mg     1.9       08 Jan 2025 06:20    TPro  5.0    /  Alb  2.5    /  TBili  1.8    /  DBili  1.3    /  AST  24     /  ALT  23     /  AlkPhos  238    09 Jan 2025 04:57                          8.0    12.33 )-----------( 73       ( 09 Jan 2025 04:57 )             24.6     PT/INR - ( 09 Jan 2025 04:57 )   PT: 15.2 sec;   INR: 1.31 ratio         PTT - ( 09 Jan 2025 04:57 )  PTT:27.4 sec        ECHO: < from: TTE Limited W or WO Ultrasound Enhancing Agent (01.06.25 @ 12:55) >  1. Technically difficult image quality.   2. Left ventricular systolic function is normal with an ejection fraction visually estimated at 65 to 70 %.   3. Normal right ventricular cavity size, with normal wall thickness, and normal right ventricular systolic function.   4. Compared to the transthoracic echocardiogram performed on 7/21/2024, there have been no significant interval changes        < from: TTE W or WO Ultrasound Enhancing Agent (07.21.24 @ 15:40) >  1. Left ventricular cavity is normal in size. Left ventricular wall thickness is normal. Left ventricular systolic function is normal with an ejection fraction visually estimated at 55 to 60 %.   2. Normal left ventricular diastolic function.   3. Normal right ventricular cavity size and normal right ventricular systolic function.   4. The left atrium is normal in size.   5. The right atrium is normal in size.   6. Trileaflet aortic valve with normal systolic excursion.   7. Structurally normal mitral valve with normal leaflet excursion.   8. Trace mitral regurgitation.   9. Mild tricuspid regurgitation.  10. Estimated pulmonary artery systolic pressure is 23 mmHg, normal pulmonary artery pressure.  11. The interatrial septum appears intact.  12. No pericardial effusion seen.          Assessment:  39y  Female with h/o metastatic breast cancer ER/FL Neg, HER-2 + on chemotherapy (last dose 12/26/24) (mets to lung, liver, spleen, spine, bone and brain), gastritis w/ intermittent episodes of dark stool (follows w/ Dr. Alonzo BISHOP and is on PPI and octreotide daily). Patient presented 1/5 with c/o worsening SOB.  She was seen in ED 1/2/25 for for weakness and SOB at which time she was found to have Hb of 5 requiring PRBC transfusion and positive for Flu A and started on tamiflu and was discharged from the ED on 1/3/25.  Her respiratory status has worsened since then w/ productive cough associated with body aches and chills.  Denies sick contacts but has had many frequent hospital visits. Patient has been afebrile, no leukocytosis. Currently on HFNC for worsening respiratory status. continued on Tamiflu. Vancomycn and Zosyn was added. Blood cultures with MSSA.  Cardiology consultation requested for evaluation of endocarditis.   -Patient with GIB overnight requiring transfusion and drop in Hgb to 6.7  -MARYANN contraindicated now with active GIB    Plan:   1. Further evaluation by GI, CV stable for EGD/colo  2. No MARYANN at this time until stabilizes and source of bleed ascertained          Zhang Dunn MD

## 2025-01-09 NOTE — PROGRESS NOTE ADULT - SUBJECTIVE AND OBJECTIVE BOX
Interval Hx:  Patient seen during rounds  Patient reports pain to be controlled on current medications  Patient denies sedation with medications      Analgesic Dosing for past 24 hours reviewed as below:    methadone    Tablet   10 milliGRAM(s) Oral (01-08-25 @ 22:14)    methadone    Tablet   5 milliGRAM(s) Oral (01-09-25 @ 05:24)   5 milliGRAM(s) Oral (01-08-25 @ 14:29)    pregabalin   200 milliGRAM(s) Oral (01-09-25 @ 05:25)   200 milliGRAM(s) Oral (01-08-25 @ 22:14)   200 milliGRAM(s) Oral (01-08-25 @ 14:29)          T(C): 36.7 (01-09-25 @ 05:13), Max: 37.1 (01-08-25 @ 08:27)  HR: 87 (01-09-25 @ 06:00) (71 - 100)  BP: 115/92 (01-09-25 @ 06:00) (98/61 - 131/80)  RR: 18 (01-09-25 @ 06:00) (18 - 19)  SpO2: 94% (01-09-25 @ 06:00) (94% - 100%)      01-08-25 @ 07:01  -  01-09-25 @ 07:00  --------------------------------------------------------  IN: 1292 mL / OUT: 800 mL / NET: 492 mL        acetaminophen     Tablet .. 650 milliGRAM(s) Oral every 6 hours PRN  acetylcysteine 10%  Inhalation 4 milliLiter(s) Inhalation every 6 hours  aluminum hydroxide/magnesium hydroxide/simethicone Suspension 30 milliLiter(s) Oral every 4 hours PRN  benzonatate 100 milliGRAM(s) Oral every 8 hours PRN  chlorhexidine 2% Cloths 1 Application(s) Topical daily  dextrose 5%. 1000 milliLiter(s) IV Continuous <Continuous>  dextrose 5%. 1000 milliLiter(s) IV Continuous <Continuous>  dextrose 50% Injectable 25 Gram(s) IV Push once  dextrose 50% Injectable 25 Gram(s) IV Push once  dextrose 50% Injectable 12.5 Gram(s) IV Push once  dextrose Oral Gel 15 Gram(s) Oral once PRN  glucagon  Injectable 1 milliGRAM(s) IntraMuscular once  guaifenesin/dextromethorphan Oral Liquid 10 milliLiter(s) Oral every 4 hours PRN  hydrocortisone 1% Cream 1 Application(s) Topical two times a day  HYDROmorphone PCA (1 mG/mL) 30 milliLiter(s) PCA Continuous PCA Continuous  insulin lispro (ADMELOG) corrective regimen sliding scale   SubCutaneous three times a day before meals  levalbuterol Inhalation 1.25 milliGRAM(s) Inhalation every 6 hours  melatonin 3 milliGRAM(s) Oral at bedtime PRN  methadone    Tablet 10 milliGRAM(s) Oral at bedtime  methadone    Tablet 5 milliGRAM(s) Oral <User Schedule>  methylPREDNISolone sodium succinate Injectable 40 milliGRAM(s) IV Push every 8 hours  nafcillin  IVPB 2 Gram(s) IV Intermittent every 4 hours  naloxone Injectable 0.1 milliGRAM(s) IV Push every 3 minutes PRN  octreotide  Injectable 100 MICROGram(s) SubCutaneous two times a day  ondansetron Injectable 4 milliGRAM(s) IV Push every 8 hours PRN  oseltamivir 75 milliGRAM(s) Oral two times a day  pantoprazole  Injectable 40 milliGRAM(s) IV Push every 12 hours  pregabalin 200 milliGRAM(s) Oral every 8 hours                          8.0    12.33 )-----------( 73       ( 09 Jan 2025 04:57 )             24.6     01-09    138  |  100  |  21.3[H]  ----------------------------<  152[H]  3.5   |  29.0  |  0.54    Ca    7.0[L]      09 Jan 2025 04:57  Mg     1.9     01-08    TPro  5.0[L]  /  Alb  2.5[L]  /  TBili  1.8  /  DBili  1.3[H]  /  AST  24  /  ALT  23  /  AlkPhos  238[H]  01-09    PT/INR - ( 09 Jan 2025 04:57 )   PT: 15.2 sec;   INR: 1.31 ratio         PTT - ( 09 Jan 2025 04:57 )  PTT:27.4 sec  Urinalysis Basic - ( 09 Jan 2025 04:57 )    Color: x / Appearance: x / SG: x / pH: x  Gluc: 152 mg/dL / Ketone: x  / Bili: x / Urobili: x   Blood: x / Protein: x / Nitrite: x   Leuk Esterase: x / RBC: x / WBC x   Sq Epi: x / Non Sq Epi: x / Bacteria: x        Pain Service   [phone]

## 2025-01-09 NOTE — PROGRESS NOTE ADULT - SUBJECTIVE AND OBJECTIVE BOX
[facility]  INFECTIOUS DISEASES at [place] / [place] / [place]  =======================================================                              Salazar Toro MD                              Professor Emeritus:  Dr Warner Mcintosh MD            Diplomates American Board of Internal Medicine & Infectious Diseases                                   Tel  [phone] Fax [phone]                                  Hospital Consult line:  [phone]  =======================================================      NATIVIDAD MYERS [id number]    Follow up: MSSA bacteremia    No fevers   on 3L NC  Noted to have drop in H/H      Allergies:  pertuzumab (Other (Severe))  Perjeta (Other (Severe))        REVIEW OF SYSTEMS:  CONSTITUTIONAL:  No Fever or chills  HEENT:   No diplopia or blurred vision.  No earache, sore throat or runny nose.  CARDIOVASCULAR:  No Chest Pain  RESPIRATORY:  No cough, shortness of breath  GASTROINTESTINAL:  No nausea, vomiting or diarrhea.  GENITOURINARY:  No dysuria, frequency or urgency. No Blood in urine  MUSCULOSKELETAL:  no joint aches, no muscle pain  SKIN:  No change in skin, hair or nails.  NEUROLOGIC:  No Headaches      Physical Exam:  GEN: NAD  HEENT: normocephalic and atraumatic.    NECK: Supple.   LUNGS: CTA B/L.  HEART: RRR  ABDOMEN: Soft, NT, ND.  +BS.    : No CVA tenderness  EXTREMITIES: Without  edema.  MSK: No joint swelling  NEUROLOGIC: No Focal Deficits   SKIN: No rash  + PORT        Vitals:  T(F): 98.8 (09 Jan 2025 08:38), Max: 98.8 (08 Jan 2025 14:35)  HR: 100 (09 Jan 2025 10:00)  BP: 98/58 (09 Jan 2025 10:00)  RR: 21 (09 Jan 2025 10:00)  SpO2: 95% (09 Jan 2025 10:00) (94% - 100%)  temp max in last 48H T(F): , Max: 98.8 (01-08-25 @ 14:35)    Current Antibiotics:  nafcillin  IVPB 2 Gram(s) IV Intermittent every 4 hours  oseltamivir 75 milliGRAM(s) Oral two times a day    Other medications:  acetylcysteine 10%  Inhalation 4 milliLiter(s) Inhalation every 6 hours  albuterol/ipratropium for Nebulization. 3 milliLiter(s) Nebulizer once  chlorhexidine 2% Cloths 1 Application(s) Topical daily  dextrose 5%. 1000 milliLiter(s) IV Continuous <Continuous>  dextrose 5%. 1000 milliLiter(s) IV Continuous <Continuous>  dextrose 50% Injectable 25 Gram(s) IV Push once  dextrose 50% Injectable 12.5 Gram(s) IV Push once  dextrose 50% Injectable 25 Gram(s) IV Push once  glucagon  Injectable 1 milliGRAM(s) IntraMuscular once  hydrocortisone 1% Cream 1 Application(s) Topical two times a day  HYDROmorphone PCA (1 mG/mL) 30 milliLiter(s) PCA Continuous PCA Continuous  insulin lispro (ADMELOG) corrective regimen sliding scale   SubCutaneous three times a day before meals  levalbuterol Inhalation 1.25 milliGRAM(s) Inhalation every 6 hours  methadone    Tablet 10 milliGRAM(s) Oral at bedtime  methadone    Tablet 5 milliGRAM(s) Oral <User Schedule>  methylPREDNISolone sodium succinate Injectable 40 milliGRAM(s) IV Push every 8 hours  octreotide  Injectable 100 MICROGram(s) SubCutaneous two times a day  pantoprazole  Injectable 40 milliGRAM(s) IV Push every 12 hours  pregabalin 200 milliGRAM(s) Oral every 8 hours                            8.0    12.33 )-----------( 73       ( 09 Jan 2025 04:57 )             24.6     01-09    138  |  100  |  21.3[H]  ----------------------------<  152[H]  3.5   |  29.0  |  0.54    Ca    7.0[L]      09 Jan 2025 04:57  Mg     1.9     01-08    TPro  5.0[L]  /  Alb  2.5[L]  /  TBili  1.8  /  DBili  1.3[H]  /  AST  24  /  ALT  23  /  AlkPhos  238[H]  01-09    RECENT CULTURES:  01-07 @ 06:19 .Blood BLOOD     Growth in aerobic bottle: Staphylococcus aureus  See previous culture [id number]  Growth in anaerobic bottle: Gram Positive Cocci in Clusters  Growth in aerobic bottle: Gram Positive Cocci in Clusters  Growth in anaerobic bottle: Gram Positive Cocci in Clusters    01-07 @ 06:15 .Blood BLOOD     Growth in aerobic bottle: Staphylococcus aureus  See previous culture [id number]  Growth in anaerobic bottle: Gram Positive Cocci in Clusters  Growth in aerobic bottle: Gram Positive Cocci in Clusters  Growth in anaerobic bottle: Gram Positive Cocci in Clusters    01-06 @ 01:34 .Sputum Sputum Staphylococcus aureus    Moderate Staphylococcus aureus  Commensal sylvester consistent with body site  Moderate Squamous epithelial cells seen per low power field  Moderate polymorphonuclear leukocytes seen per low power field  Few Gram Negative Rods seen per oil power field  Few Gram positive cocci in pairs seen per oil power field  Rare Gram Positive Rods seen per oil power field  Rare Yeast like cells seen per oil power field    01-05 @ 12:34 Clean Catch Clean Catch (Midstream) Escherichia coli    10,000 - 49,000 CFU/mL Escherichia coli  <10,000 CFU/mL Normal Urogenital Sylvester    01-05 @ 10:54 .Blood BLOOD Blood Culture PCR  Staphylococcus aureus    Growth in aerobic and anaerobic bottles: Staphylococcus aureus  Direct identification is available within approximately 3-5  hours either by Blood Panel Multiplexed PCR or Direct  MALDI-TOF. Details: [email or web address]  Growth in anaerobic bottle: Gram Positive Cocci in Clusters  Growth in aerobic bottle: Gram Positive Cocci in Clusters      WBC Count: 12.33 K/uL (01-09-25 @ 04:57)  WBC Count: 11.98 K/uL (01-09-25 @ 00:08)  WBC Count: 11.64 K/uL (01-08-25 @ 06:20)  WBC Count: 7.43 K/uL (01-07-25 @ 12:23)  WBC Count: 6.03 K/uL (01-07-25 @ 06:15)  WBC Count: 6.51 K/uL (01-07-25 @ 02:54)  WBC Count: 2.96 K/uL (01-06-25 @ 07:36)  WBC Count: 2.66 K/uL (01-06-25 @ 06:00)  WBC Count: 2.97 K/uL (01-05-25 @ 19:27)  WBC Count: 3.05 K/uL (01-05-25 @ 10:54)    Creatinine: 0.54 mg/dL (01-09-25 @ 04:57)  Creatinine: 0.56 mg/dL (01-08-25 @ 06:20)  Creatinine: 0.61 mg/dL (01-07-25 @ 06:15)  Creatinine: 0.49 mg/dL (01-06-25 @ 07:36)  Creatinine: 0.44 mg/dL (01-05-25 @ 10:54)    Procalcitonin: 1.37 ng/mL (01-08-25 @ 06:20)  Procalcitonin: 2.32 ng/mL (01-07-25 @ 06:15)     SARS-CoV-2: NotDetec (01-05-25 @ 10:54)  SARS-CoV-2 Result: NotDetec (01-02-25 @ 19:20)                Influenza AH3 (RapRVP): Detected (01.05.25 @ 10:54)      Urinalysis + Microscopic Examination (01.06.25 @ 03:00)    pH Urine: 6.0   Urine Appearance: Clear   Color: Yellow   Specific Gravity: 1.015   Protein, Urine: Trace mg/dL   Glucose Qualitative, Urine: Negative mg/dL   Ketone - Urine: Negative mg/dL   Blood, Urine: Small   Bilirubin: Negative   Urobilinogen: 1.0 mg/dL   Leukocyte Esterase Concentration: Small   Nitrite: Negative   White Blood Cell - Urine: 6 /HPF   Red Blood Cell - Urine: 5 /HPF   Bacteria: Occasional /HPF   Epithelial Cells: 2 /HPF      < from: TTE Limited W or WO Ultrasound Enhancing Agent (01.06.25 @ 12:55) >  CONCLUSIONS:      1. Technically difficult image quality.   2. Left ventricular systolic function is normal with an ejection fraction visually estimated at 65 to 70 %.   3. Normal right ventricular cavity size, with normal wall thickness, and normal right ventricular systolic function.   4. Compared to the transthoracic echocardiogram performed on 7/21/2024, there have been no significant interval changes.    < end of copied text >

## 2025-01-09 NOTE — PROGRESS NOTE ADULT - ASSESSMENT
40 y/o F w/ PMH of metastatic breast cancer ER/OK Neg, HER-2 pos on chemotherapy (mets to lung, liver, spleen, spine, bone and brain), ?GAVE related bleeding (follows w/ Dr. Rosado GI and is on PPI and octreotide daily) presented for worsening sob over the past few days.  Pt was seen in ED 1/2/25 for for weakness and sob at which time she was found to have Hb of 5 requiring prbc transfusion and positive for Flu A and started on tamiflu.  Pts respirostyr status has worsened since then w/ productive cough but is unable to desribe sputum.  Pt tachycardic, tachypneic w/ O2 at 90% ORA and placed on 2L NC w/ improved saturation.  Clinically toxic appearing speaking 2-3 word sentences w/ course scattered ronchi on exam and expiratory insp/exp scattered wheezing.   Initial w/u significant for leukopenia, thrombocytopenia, RVP +FlA, CTA chest negative for PE but found to have multifocal pna.   Recievied vanco/zosyn and 1550cc IVNS bolus in ED.  [facility] consulted and will admit for sepsis 2/2 multifocal PNA      Sepsis 2/2 Flu A w/ superimposed multifocal PNA  Acute hypoxic respiratory failure 2/2 Flu A w/ superimposed multifocal PNA   - now on NC, comfortable  - CTA 1/5 negative for PE but w/ pulmonary/alveolar edema and small loculated L-effusion, wide spread patchy b/l air space disease increased from prior image   - persistently positive RVP for flu A  - c/w tamiflu through 1/10  - pulm following   - c/w IV steroids, taper to q24hr today  - c/w abx per ID recs  - sputum clx growing moderate staph aureus   - repeat CT 1/8 shows improved pleural effusion but worsening GGOs, unclear if infectious or malignant etiology   - will repeat CT today   - TTE EF 65-70%, no WMA    MSSA bacteremia  - blood clx positive 1/5, repeat in process  - ID following  - c/w nafcillin   - needs MARYANN, per GI conservative management     Chronic normocytic anemia 2/2 metastatic breast cancer on chemo and 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   Thrombocytopenia likely 2/2 sepsis   Breast cancer ER/OK Neg, HER-2 pos on chemotherapy w/ mets to lung, liver, spleen, spine, bone and brain  - Baseline Hb ranges 8-9    - s/p prbc transfusion on 1/2 and morning of 1/8  - pt had bloody bowel movements overnight  - repeat hgb <7, transfused 2 units  - f/u repeat CBC  - c/w home sub q octreotide and IV protonix  - GI consulted, suspect likely diverticular bleed and will likely resolved without intervention, monitor vitals and CBC  - Plts likely low from sepsis and no intervention required at this time  - Monitor CBC and transfuse for Hb<7 and plts<20K in setting of sepsis   - [facility] following  - monitor on tele and     Breast cancer ER/OK Neg, HER-2 pos on chemotherapy w/ mets to lung, liver, spleen, spine, bone and brain  - c/w pregabalin, methylphenidate (confirmed on ISTOP Reference #: [id number])  - Reports being on methadone for pain but last 30 day script on istop dispensed 09/19/24  - Is on po dilaudid 4mg prn w/ last script dispensed 12/04/24   - now on PCA pump  - pain management following, revert back to PO Dilaudid regimen on d/c     Transaminitis w/ cholestatic predominance   - ALP likely from mets to bone but follow GGT to confirm   - AST/ALT relatively unchanged from prior  - t bili improved  - Trend LFTs     VTE ppx: SCDs, avoid chemical VTE ppx given known brain mets, place on SCDs instead     Dispo: Acute. Anticipated LOS unclear

## 2025-01-09 NOTE — CONSULT NOTE ADULT - SUBJECTIVE AND OBJECTIVE BOX
Chief Complaint:  Patient is a 39y old  Female who presents with a chief complaint of sepsis (09 Jan 2025 08:40)    HPI:  40 y/o F w/ PMH of metastatic breast cancer ER/CO Neg, HER-2 pos on chemotherapy (mets to lung, liver, spleen, spine, bone and brain), gastritis w/ intermittent episodes of dark stool (follows w/ Dr. Rosado GI and is on PPI and octreotide daily) presented for worsening sob over the past few days.  Pt was seen in ED 1/2/25 for for weakness and sob at which time she was found to have Hb of 5 requiring prbc transfusion and positive for Flu A and started on tamiflu.  Pts respiratory status has worsened since then w/ productive cough but is unable to describe sputum.  Pt reports body aches and chills.  Pt denies sick contacts but has had many frequent hospital visits.  She denies abd pain, N/V, diarrhea, dysuria, cp, palpitations.   (05 Jan 2025 16:40)      PAST MEDICAL & SURGICAL HISTORY:  H/O compression fracture of spine      Anxiety      Metastatic breast cancer      H/O pleural effusion      Pericardial effusion      S/P tonsillectomy      H/O chest tube placement  12/23/21      S/P pericardiocentesis  12/28/21          REVIEW OF SYSTEMS:   General: Negative  HEENT: Negative  CV: Negative  Respiratory: Negative  GI: See HPI  : Negative  MSK: Negative  Hematologic: Negative  Skin: Negative    MEDICATIONS:   MEDICATIONS  (STANDING):  acetylcysteine 10%  Inhalation 4 milliLiter(s) Inhalation every 6 hours  albuterol/ipratropium for Nebulization. 3 milliLiter(s) Nebulizer once  chlorhexidine 2% Cloths 1 Application(s) Topical daily  dextrose 5%. 1000 milliLiter(s) (100 mL/Hr) IV Continuous <Continuous>  dextrose 5%. 1000 milliLiter(s) (50 mL/Hr) IV Continuous <Continuous>  dextrose 50% Injectable 25 Gram(s) IV Push once  dextrose 50% Injectable 12.5 Gram(s) IV Push once  dextrose 50% Injectable 25 Gram(s) IV Push once  diphenhydrAMINE Injectable 25 milliGRAM(s) IV Push once  glucagon  Injectable 1 milliGRAM(s) IntraMuscular once  hydrocortisone 1% Cream 1 Application(s) Topical two times a day  HYDROmorphone PCA (1 mG/mL) 30 milliLiter(s) PCA Continuous PCA Continuous  insulin lispro (ADMELOG) corrective regimen sliding scale   SubCutaneous three times a day before meals  levalbuterol Inhalation 1.25 milliGRAM(s) Inhalation every 6 hours  methadone    Tablet 10 milliGRAM(s) Oral at bedtime  methadone    Tablet 5 milliGRAM(s) Oral <User Schedule>  methylPREDNISolone sodium succinate Injectable 40 milliGRAM(s) IV Push every 8 hours  nafcillin  IVPB 2 Gram(s) IV Intermittent every 4 hours  octreotide  Injectable 100 MICROGram(s) SubCutaneous two times a day  oseltamivir 75 milliGRAM(s) Oral two times a day  pantoprazole  Injectable 40 milliGRAM(s) IV Push every 12 hours  pregabalin 200 milliGRAM(s) Oral every 8 hours    MEDICATIONS  (PRN):  acetaminophen     Tablet .. 650 milliGRAM(s) Oral every 6 hours PRN Temp greater or equal to 38C (100.4F), Mild Pain (1 - 3)  aluminum hydroxide/magnesium hydroxide/simethicone Suspension 30 milliLiter(s) Oral every 4 hours PRN Dyspepsia  benzonatate 100 milliGRAM(s) Oral every 8 hours PRN Cough  dextrose Oral Gel 15 Gram(s) Oral once PRN Blood Glucose LESS THAN 70 milliGRAM(s)/deciliter  guaifenesin/dextromethorphan Oral Liquid 10 milliLiter(s) Oral every 4 hours PRN Cough  melatonin 3 milliGRAM(s) Oral at bedtime PRN Insomnia  naloxone Injectable 0.1 milliGRAM(s) IV Push every 3 minutes PRN For ANY of the following changes in patient status:  A. RR LESS THAN 10 breaths per minute, B. Oxygen saturation LESS THAN 90%, C. Sedation score of 6  ondansetron Injectable 4 milliGRAM(s) IV Push every 8 hours PRN Nausea and/or Vomiting      Packed Red Cells Order:  1 Unit  Indication: Anemia due to Active Hemorrhage - Medical Conditions e.  Infuse Unit : 3.5 Hours (01-09-25 @ 00:34)  Packed Red Cells Order:  1 Unit  Indication: Anemia due to Active Hemorrhage - Medical Conditions e.  Infuse Unit : 3 Hours (01-08-25 @ 22:57)  Packed Red Cells Order:  1 Unit  Indication: Hgb <7 gm/dL  Infuse Unit : 3 Hours (01-08-25 @ 07:32)  Packed Red Cells Order:  1 Unit  Indication: Symptomatic Anemia - e.g. Chest Pain, Orthostasis, Tach  Infuse Unit : 3 Hours (01-06-25 @ 08:05)      DIET:  Diet, Regular:   Supplement Feeding Modality:  Oral  Ensure Enlive Cans or Servings Per Day:  1       Frequency:  Daily (01-07-25 @ 15:58) [Active]          ALLERGIES:   Allergies    pertuzumab (Other (Severe))  Perjeta (Other (Severe))    Intolerances        Substance Use:   (  ) never used  (  ) other:  Tobacco Usage:  (   ) never smoked   (   ) former smoker   (   ) current smoker  (     ) pack year  (        ) last cigarette date  Alcohol Usage:    Family History   IBD (  ) Yes   (  ) No  GI Malignancy (  )  Yes    (  ) No    Health Management  Last Colonoscopy:  Last Endoscopy:     VITAL SIGNS:   Vital Signs Last 24 Hrs  T(C): 37.1 (09 Jan 2025 08:38), Max: 37.1 (08 Jan 2025 14:35)  T(F): 98.8 (09 Jan 2025 08:38), Max: 98.8 (08 Jan 2025 14:35)  HR: 77 (09 Jan 2025 08:40) (71 - 100)  BP: 118/99 (09 Jan 2025 08:40) (98/61 - 131/80)  BP(mean): 106 (09 Jan 2025 08:40) (73 - 116)  RR: 19 (09 Jan 2025 08:40) (18 - 19)  SpO2: 97% (09 Jan 2025 08:40) (94% - 100%)    Parameters below as of 09 Jan 2025 08:40  Patient On (Oxygen Delivery Method): nasal cannula  O2 Flow (L/min): 4    I&O's Summary    08 Jan 2025 07:01  -  09 Jan 2025 07:00  --------------------------------------------------------  IN: 1292 mL / OUT: 800 mL / NET: 492 mL        PHYSICAL EXAM:   GENERAL:  No acute distress  HEENT:  NC/AT, conjunctiva clear, sclera anicteric  CHEST:  No increased effort  HEART:  Regular rate  ABDOMEN:  Soft, non-tender, non-distended, normoactive bowel sounds, no rebound or guarding  EXTREMITIES: No edema  SKIN:  Warm, dry  NEURO:  Calm, cooperative    LABS:                        8.0    12.33 )-----------( 73       ( 09 Jan 2025 04:57 )             24.6     Hemoglobin: 8.0 g/dL (01-09-25 @ 04:57)  Hemoglobin: 6.7 g/dL (01-09-25 @ 00:08)  Hemoglobin: 6.9 g/dL (01-08-25 @ 06:20)  Hemoglobin: 7.1 g/dL (01-07-25 @ 12:23)  Hemoglobin: 7.1 g/dL (01-07-25 @ 06:15)  Hemoglobin: 7.5 g/dL (01-07-25 @ 02:54)    01-09    138  |  100  |  21.3[H]  ----------------------------<  152[H]  3.5   |  29.0  |  0.54    Ca    7.0[L]      09 Jan 2025 04:57  Mg     1.9     01-08    TPro  5.0[L]  /  Alb  2.5[L]  /  TBili  1.8  /  DBili  1.3[H]  /  AST  24  /  ALT  23  /  AlkPhos  238[H]  01-09    LIVER FUNCTIONS - ( 09 Jan 2025 04:57 )  Alb: 2.5 g/dL / Pro: 5.0 g/dL / ALK PHOS: 238 U/L / ALT: 23 U/L / AST: 24 U/L / GGT: x             PT/INR - ( 09 Jan 2025 04:57 )   PT: 15.2 sec;   INR: 1.31 ratio         PTT - ( 09 Jan 2025 04:57 )  PTT:27.4 sec    Culture - Blood (collected 07 Jan 2025 06:19)  Source: .Blood BLOOD  Gram Stain (08 Jan 2025 17:02):    Growth in aerobic bottle: Gram Positive Cocci in Clusters    Growth in anaerobic bottle: Gram Positive Cocci in Clusters  Preliminary Report (08 Jan 2025 17:02):    Growth in aerobic bottle: Gram Positive Cocci in Clusters    Growth in anaerobic bottle: Gram Positive Cocci in Clusters    Culture - Blood (collected 07 Jan 2025 06:15)  Source: .Blood BLOOD  Gram Stain (08 Jan 2025 19:40):    Growth in aerobic bottle: Gram Positive Cocci in Clusters    Growth in anaerobic bottle: Gram Positive Cocci in Clusters  Preliminary Report (08 Jan 2025 21:52):    Growth in aerobic bottle: Staphylococcus aureus    See previous culture [id number]    Growth in anaerobic bottle: Gram Positive Cocci in Clusters                                        RADIOLOGY & ADDITIONAL STUDIES:      -- -- --   ACC: [id number] EXAM:  US ABDOMEN RT UPR QUADRANT   ORDERED BY: SOTO DIAZ     PROCEDURE DATE:  01/06/2025          INTERPRETATION:  CLINICAL INFORMATION: Abnormal liver function tests.    COMPARISON: 9/8/2024    TECHNIQUE: Sonography of theright upper quadrant.    FINDINGS:  Liver: Normal in size and echogenicity. No focal lesions are identified.  Bile ducts: Normal caliber. Common bile duct measures 2 mm.  Gallbladder: Layering biliary sludge and stones. No localized tenderness,   wall thickening, pericholecystic fluid, or evidence of acute   cholecystitis.  Pancreas: Visualized portions are within normal limits.  Right kidney: 10.6 cm. No hydronephrosis.  Ascites: None.  IVC: Visualized portions are within normal limits.    IMPRESSION:  *  Sludge and stones in the gallbladder.  *  No evidence of acute cholecystitis or biliary ductal dilatation.        --- End of Report ---            ROOPA DURAN MD; Attending Radiologist  This document has been electronically signed. Jan 6 202510:31AM     Chief Complaint:  Patient is a 39y old  Female who presents with a chief complaint of sepsis (09 Jan 2025 08:40)    HPI:  This is a 38 y/o F w/ PMH of metastatic breast cancer ER/NM Neg, HER-2 pos on chemotherapy (mets to lung, liver, spleen, spine, bone and brain), gastritis w/ intermittent episodes of dark stool (follows w/ Dr. Rosado GI and is on PPI and octreotide daily) presented for worsening sob over the past few days. Patient was seen in ED 1/2/25 for weakness and sob at which time she was found to have Hb of 5 requiring prbc transfusion and positive for Flu A and started on Tamiflu. Patient's respiratory status has worsened since then w/ productive cough but is unable to describe sputum. Also reported body aches and chills.  GI consulted for rectal bleeding. As per chart note, patient had a large bloody bowel movement overnight. Hgb this morning 6.7gm. Patient reports rectal bleeding started 2 days ago with some mild abdominal cramping with bowel movements. She has not personally seen blood, but she was told she was bleeding. She also reports intermittent nausea no vomiting. No A/C. No NSAIDs. WILLIE with brown stool. She had a colonoscopy November 2024 with Internal hemorrhoids o/w normal colonoscopy to the cecum. Anemia attributed to chemotherapy at that time. She had an EGD/colonoscopy with non-erosive gastritis and normal colonoscopy. Denies chest pain, palpitations, abdominal pain, constipation, diarrhea, melena.     PAST MEDICAL & SURGICAL HISTORY:  H/O compression fracture of spine      Anxiety      Metastatic breast cancer      H/O pleural effusion      Pericardial effusion      S/P tonsillectomy      H/O chest tube placement  12/23/21      S/P pericardiocentesis  12/28/21          REVIEW OF SYSTEMS:   General: Negative  HEENT: Negative  CV: Negative  Respiratory: Negative  GI: See HPI  : Negative  MSK: Negative  Hematologic: Negative  Skin: Negative    MEDICATIONS:   MEDICATIONS  (STANDING):  acetylcysteine 10%  Inhalation 4 milliLiter(s) Inhalation every 6 hours  albuterol/ipratropium for Nebulization. 3 milliLiter(s) Nebulizer once  chlorhexidine 2% Cloths 1 Application(s) Topical daily  dextrose 5%. 1000 milliLiter(s) (100 mL/Hr) IV Continuous <Continuous>  dextrose 5%. 1000 milliLiter(s) (50 mL/Hr) IV Continuous <Continuous>  dextrose 50% Injectable 25 Gram(s) IV Push once  dextrose 50% Injectable 12.5 Gram(s) IV Push once  dextrose 50% Injectable 25 Gram(s) IV Push once  diphenhydrAMINE Injectable 25 milliGRAM(s) IV Push once  glucagon  Injectable 1 milliGRAM(s) IntraMuscular once  hydrocortisone 1% Cream 1 Application(s) Topical two times a day  HYDROmorphone PCA (1 mG/mL) 30 milliLiter(s) PCA Continuous PCA Continuous  insulin lispro (ADMELOG) corrective regimen sliding scale   SubCutaneous three times a day before meals  levalbuterol Inhalation 1.25 milliGRAM(s) Inhalation every 6 hours  methadone    Tablet 10 milliGRAM(s) Oral at bedtime  methadone    Tablet 5 milliGRAM(s) Oral <User Schedule>  methylPREDNISolone sodium succinate Injectable 40 milliGRAM(s) IV Push every 8 hours  nafcillin  IVPB 2 Gram(s) IV Intermittent every 4 hours  octreotide  Injectable 100 MICROGram(s) SubCutaneous two times a day  oseltamivir 75 milliGRAM(s) Oral two times a day  pantoprazole  Injectable 40 milliGRAM(s) IV Push every 12 hours  pregabalin 200 milliGRAM(s) Oral every 8 hours    MEDICATIONS  (PRN):  acetaminophen     Tablet .. 650 milliGRAM(s) Oral every 6 hours PRN Temp greater or equal to 38C (100.4F), Mild Pain (1 - 3)  aluminum hydroxide/magnesium hydroxide/simethicone Suspension 30 milliLiter(s) Oral every 4 hours PRN Dyspepsia  benzonatate 100 milliGRAM(s) Oral every 8 hours PRN Cough  dextrose Oral Gel 15 Gram(s) Oral once PRN Blood Glucose LESS THAN 70 milliGRAM(s)/deciliter  guaifenesin/dextromethorphan Oral Liquid 10 milliLiter(s) Oral every 4 hours PRN Cough  melatonin 3 milliGRAM(s) Oral at bedtime PRN Insomnia  naloxone Injectable 0.1 milliGRAM(s) IV Push every 3 minutes PRN For ANY of the following changes in patient status:  A. RR LESS THAN 10 breaths per minute, B. Oxygen saturation LESS THAN 90%, C. Sedation score of 6  ondansetron Injectable 4 milliGRAM(s) IV Push every 8 hours PRN Nausea and/or Vomiting      Packed Red Cells Order:  1 Unit  Indication: Anemia due to Active Hemorrhage - Medical Conditions e.  Infuse Unit : 3.5 Hours (01-09-25 @ 00:34)  Packed Red Cells Order:  1 Unit  Indication: Anemia due to Active Hemorrhage - Medical Conditions e.  Infuse Unit : 3 Hours (01-08-25 @ 22:57)  Packed Red Cells Order:  1 Unit  Indication: Hgb <7 gm/dL  Infuse Unit : 3 Hours (01-08-25 @ 07:32)  Packed Red Cells Order:  1 Unit  Indication: Symptomatic Anemia - e.g. Chest Pain, Orthostasis, Tach  Infuse Unit : 3 Hours (01-06-25 @ 08:05)      DIET:  Diet, Regular:   Supplement Feeding Modality:  Oral  Ensure Enlive Cans or Servings Per Day:  1       Frequency:  Daily (01-07-25 @ 15:58) [Active]          ALLERGIES:   Allergies    pertuzumab (Other (Severe))  Perjeta (Other (Severe))    Intolerances        Substance Use:   (  ) never used  (  ) other:  Tobacco Usage:  (   ) never smoked   (   ) former smoker   (   ) current smoker  (     ) pack year  (        ) last cigarette date  Alcohol Usage:    Family History   IBD (  ) Yes   (  ) No  GI Malignancy (  )  Yes    (  ) No    Health Management  Last Colonoscopy:  Last Endoscopy:     VITAL SIGNS:   Vital Signs Last 24 Hrs  T(C): 37.1 (09 Jan 2025 08:38), Max: 37.1 (08 Jan 2025 14:35)  T(F): 98.8 (09 Jan 2025 08:38), Max: 98.8 (08 Jan 2025 14:35)  HR: 77 (09 Jan 2025 08:40) (71 - 100)  BP: 118/99 (09 Jan 2025 08:40) (98/61 - 131/80)  BP(mean): 106 (09 Jan 2025 08:40) (73 - 116)  RR: 19 (09 Jan 2025 08:40) (18 - 19)  SpO2: 97% (09 Jan 2025 08:40) (94% - 100%)    Parameters below as of 09 Jan 2025 08:40  Patient On (Oxygen Delivery Method): nasal cannula  O2 Flow (L/min): 4    I&O's Summary    08 Jan 2025 07:01  -  09 Jan 2025 07:00  --------------------------------------------------------  IN: 1292 mL / OUT: 800 mL / NET: 492 mL        PHYSICAL EXAM:   GENERAL:  No acute distress  HEENT:  NC/AT, conjunctiva clear, sclera anicteric  CHEST:  No increased effort  HEART:  Regular rate  ABDOMEN:  Soft, non-tender, non-distended, normoactive bowel sounds, no rebound or guarding  EXTREMITIES: No edema  SKIN:  Warm, dry  NEURO:  Calm, cooperative    LABS:                        8.0    12.33 )-----------( 73       ( 09 Jan 2025 04:57 )             24.6     Hemoglobin: 8.0 g/dL (01-09-25 @ 04:57)  Hemoglobin: 6.7 g/dL (01-09-25 @ 00:08)  Hemoglobin: 6.9 g/dL (01-08-25 @ 06:20)  Hemoglobin: 7.1 g/dL (01-07-25 @ 12:23)  Hemoglobin: 7.1 g/dL (01-07-25 @ 06:15)  Hemoglobin: 7.5 g/dL (01-07-25 @ 02:54)    01-09    138  |  100  |  21.3[H]  ----------------------------<  152[H]  3.5   |  29.0  |  0.54    Ca    7.0[L]      09 Jan 2025 04:57  Mg     1.9     01-08    TPro  5.0[L]  /  Alb  2.5[L]  /  TBili  1.8  /  DBili  1.3[H]  /  AST  24  /  ALT  23  /  AlkPhos  238[H]  01-09    LIVER FUNCTIONS - ( 09 Jan 2025 04:57 )  Alb: 2.5 g/dL / Pro: 5.0 g/dL / ALK PHOS: 238 U/L / ALT: 23 U/L / AST: 24 U/L / GGT: x             PT/INR - ( 09 Jan 2025 04:57 )   PT: 15.2 sec;   INR: 1.31 ratio         PTT - ( 09 Jan 2025 04:57 )  PTT:27.4 sec    Culture - Blood (collected 07 Jan 2025 06:19)  Source: .Blood BLOOD  Gram Stain (08 Jan 2025 17:02):    Growth in aerobic bottle: Gram Positive Cocci in Clusters    Growth in anaerobic bottle: Gram Positive Cocci in Clusters  Preliminary Report (08 Jan 2025 17:02):    Growth in aerobic bottle: Gram Positive Cocci in Clusters    Growth in anaerobic bottle: Gram Positive Cocci in Clusters    Culture - Blood (collected 07 Jan 2025 06:15)  Source: .Blood BLOOD  Gram Stain (08 Jan 2025 19:40):    Growth in aerobic bottle: Gram Positive Cocci in Clusters    Growth in anaerobic bottle: Gram Positive Cocci in Clusters  Preliminary Report (08 Jan 2025 21:52):    Growth in aerobic bottle: Staphylococcus aureus    See previous culture [id number]    Growth in anaerobic bottle: Gram Positive Cocci in Clusters  RADIOLOGY & ADDITIONAL STUDIES:      -- -- --   ACC: [id number] EXAM:  US ABDOMEN RT UPR QUADRANT   ORDERED BY: SOTO DIAZ     PROCEDURE DATE:  01/06/2025          INTERPRETATION:  CLINICAL INFORMATION: Abnormal liver function tests.    COMPARISON: 9/8/2024    TECHNIQUE: Sonography of theright upper quadrant.    FINDINGS:  Liver: Normal in size and echogenicity. No focal lesions are identified.  Bile ducts: Normal caliber. Common bile duct measures 2 mm.  Gallbladder: Layering biliary sludge and stones. No localized tenderness,   wall thickening, pericholecystic fluid, or evidence of acute   cholecystitis.  Pancreas: Visualized portions are within normal limits.  Right kidney: 10.6 cm. No hydronephrosis.  Ascites: None.  IVC: Visualized portions are within normal limits.    IMPRESSION:  *  Sludge and stones in the gallbladder.  *  No evidence of acute cholecystitis or biliary ductal dilatation.    --- End of Report ---      ROOPA DURAN MD; Attending Radiologist  This document has been electronically signed. Jan 6 202510:31AM

## 2025-01-09 NOTE — PROGRESS NOTE ADULT - ASSESSMENT
39y  Female with h/o metastatic breast cancer ER/MS Neg, HER-2 + on chemotherapy (last dose 12/26/24) (mets to lung, liver, spleen, spine, bone and brain), gastritis w/ intermittent episodes of dark stool (follows w/ Dr. Rosado GI and is on PPI and octreotide daily). Patient presented 1/5 with c/o worsening SOB.  She was seen in ED 1/2/25 for for weakness and SOB at which time she was found to have Hb of 5 requiring PRBC transfusion and positive for Flu A and started on tamiflu and was discharged from the ED on 1/3/25.  Her respiratory status has worsened since then w/ productive cough associated with body aches and chills.  Denies sick contacts but has had many frequent hospital visits. Patient has been afebrile, no leukocytosis. Was placed on BIPAP for Worsening respiratory status. continued on Tamiflu. Vancomycn and Zosyn was added. Blood cultures with MSSA. ID input requested.       MSSA bacteremia   Staphylococcus aureus PNA   Influenza A   metastatic breast cancer ER/MS Neg, HER-2 + on chemotherapy   Port in place       - Blood cultures 1/5, 1/7 reporting MSSA    - Repeat blood cultures ordered for q 48hours next one will be 1/11/25  - Sputum Cx 1/6 Staphylococcus aureus  - Urine Cx 1/5 reporting 10k - 49k Escherichia coli  of ? significance  since UA not concerning for UTI   - RVP/COVID 19 PCR + Influenza A  - CTA Chest reporting No acute pulmonary embolism. Wide spread patchy airspace opacities bilaterally, increased since the prior exam.  - US RUQ reporting No evidence of acute cholecystitis or biliary ductal dilatation.  - Procalcitonin level 2.32  - TTE 1/6 with no veg  - Called cardiology consult for MARYANN  - GI eval noted, pending possible EGD   - Continue oseltamivir 75mg [id number] till 1/10/25  - Continue Nafcillin 2gm IV [id number]  - Hold off on PICC/Midline for now unless needed for reasons other than infectious diseases  - Follow up cultures  - Trend Fever  - Trend WBC      Thank you for allowing me to participate in the care of your patient.   Will Follow    Discussed treatment plan with: Cardiology. Dr Bryant/Jumana NP and Clinical pharmacy

## 2025-01-09 NOTE — PROGRESS NOTE ADULT - SUBJECTIVE AND OBJECTIVE BOX
[facility] Division of Medicine    SUBJECTIVE / OVERNIGHT EVENTS: Overnight noted to have bloody bowel movements and decreased hgb on CBC. ordered for blood transfusion. see over night PA note for details. Pt seen at the bedside. Denies any new complaints. All other systems reviewed and are negative.    MEDICATIONS  (STANDING):  acetylcysteine 10%  Inhalation 4 milliLiter(s) Inhalation every 6 hours  albuterol/ipratropium for Nebulization. 3 milliLiter(s) Nebulizer once  chlorhexidine 2% Cloths 1 Application(s) Topical daily  dextrose 5%. 1000 milliLiter(s) (50 mL/Hr) IV Continuous <Continuous>  dextrose 5%. 1000 milliLiter(s) (100 mL/Hr) IV Continuous <Continuous>  dextrose 50% Injectable 25 Gram(s) IV Push once  dextrose 50% Injectable 12.5 Gram(s) IV Push once  dextrose 50% Injectable 25 Gram(s) IV Push once  glucagon  Injectable 1 milliGRAM(s) IntraMuscular once  hydrocortisone 1% Cream 1 Application(s) Topical two times a day  HYDROmorphone PCA (1 mG/mL) 30 milliLiter(s) PCA Continuous PCA Continuous  insulin lispro (ADMELOG) corrective regimen sliding scale   SubCutaneous three times a day before meals  levalbuterol Inhalation 1.25 milliGRAM(s) Inhalation every 6 hours  methadone    Tablet 10 milliGRAM(s) Oral at bedtime  methadone    Tablet 5 milliGRAM(s) Oral <User Schedule>  methylPREDNISolone sodium succinate Injectable 40 milliGRAM(s) IV Push every 8 hours  nafcillin  IVPB 2 Gram(s) IV Intermittent every 4 hours  octreotide  Injectable 100 MICROGram(s) SubCutaneous two times a day  oseltamivir 75 milliGRAM(s) Oral two times a day  pantoprazole  Injectable 40 milliGRAM(s) IV Push every 12 hours  pregabalin 200 milliGRAM(s) Oral every 8 hours    MEDICATIONS  (PRN):  acetaminophen     Tablet .. 650 milliGRAM(s) Oral every 6 hours PRN Temp greater or equal to 38C (100.4F), Mild Pain (1 - 3)  aluminum hydroxide/magnesium hydroxide/simethicone Suspension 30 milliLiter(s) Oral every 4 hours PRN Dyspepsia  benzonatate 100 milliGRAM(s) Oral every 8 hours PRN Cough  dextrose Oral Gel 15 Gram(s) Oral once PRN Blood Glucose LESS THAN 70 milliGRAM(s)/deciliter  guaifenesin/dextromethorphan Oral Liquid 10 milliLiter(s) Oral every 4 hours PRN Cough  melatonin 3 milliGRAM(s) Oral at bedtime PRN Insomnia  naloxone Injectable 0.1 milliGRAM(s) IV Push every 3 minutes PRN For ANY of the following changes in patient status:  A. RR LESS THAN 10 breaths per minute, B. Oxygen saturation LESS THAN 90%, C. Sedation score of 6  ondansetron Injectable 4 milliGRAM(s) IV Push every 8 hours PRN Nausea and/or Vomiting      I&O's Summary    08 Jan 2025 07:01  -  09 Jan 2025 07:00  --------------------------------------------------------  IN: 1292 mL / OUT: 800 mL / NET: 492 mL        acetaminophen     Tablet .. 650 milliGRAM(s) Oral every 6 hours PRN  acetylcysteine 10%  Inhalation 4 milliLiter(s) Inhalation every 6 hours  albuterol/ipratropium for Nebulization. 3 milliLiter(s) Nebulizer once  aluminum hydroxide/magnesium hydroxide/simethicone Suspension 30 milliLiter(s) Oral every 4 hours PRN  benzonatate 100 milliGRAM(s) Oral every 8 hours PRN  chlorhexidine 2% Cloths 1 Application(s) Topical daily  dextrose 5%. 1000 milliLiter(s) IV Continuous <Continuous>  dextrose 5%. 1000 milliLiter(s) IV Continuous <Continuous>  dextrose 50% Injectable 25 Gram(s) IV Push once  dextrose 50% Injectable 12.5 Gram(s) IV Push once  dextrose 50% Injectable 25 Gram(s) IV Push once  dextrose Oral Gel 15 Gram(s) Oral once PRN  glucagon  Injectable 1 milliGRAM(s) IntraMuscular once  guaifenesin/dextromethorphan Oral Liquid 10 milliLiter(s) Oral every 4 hours PRN  hydrocortisone 1% Cream 1 Application(s) Topical two times a day  HYDROmorphone PCA (1 mG/mL) 30 milliLiter(s) PCA Continuous PCA Continuous  insulin lispro (ADMELOG) corrective regimen sliding scale   SubCutaneous three times a day before meals  levalbuterol Inhalation 1.25 milliGRAM(s) Inhalation every 6 hours  melatonin 3 milliGRAM(s) Oral at bedtime PRN  methadone    Tablet 10 milliGRAM(s) Oral at bedtime  methadone    Tablet 5 milliGRAM(s) Oral <User Schedule>  methylPREDNISolone sodium succinate Injectable 40 milliGRAM(s) IV Push every 8 hours  nafcillin  IVPB 2 Gram(s) IV Intermittent every 4 hours  naloxone Injectable 0.1 milliGRAM(s) IV Push every 3 minutes PRN  octreotide  Injectable 100 MICROGram(s) SubCutaneous two times a day  ondansetron Injectable 4 milliGRAM(s) IV Push every 8 hours PRN  oseltamivir 75 milliGRAM(s) Oral two times a day  pantoprazole  Injectable 40 milliGRAM(s) IV Push every 12 hours  pregabalin 200 milliGRAM(s) Oral every 8 hours      PHYSICAL EXAM:  Vital Signs Last 24 Hrs  T(C): 37.1 (09 Jan 2025 08:38), Max: 37.1 (08 Jan 2025 14:35)  T(F): 98.8 (09 Jan 2025 08:38), Max: 98.8 (08 Jan 2025 14:35)  HR: 89 (09 Jan 2025 12:00) (71 - 100)  BP: 103/73 (09 Jan 2025 12:00) (98/58 - 124/110)  BP(mean): 84 (09 Jan 2025 12:00) (70 - 116)  RR: 21 (09 Jan 2025 10:00) (18 - 21)  SpO2: 90% (09 Jan 2025 12:00) (90% - 100%)    Parameters below as of 09 Jan 2025 12:00  Patient On (Oxygen Delivery Method): nasal cannula  O2 Flow (L/min): 4        CONSTITUTIONAL: no apparent distress  RESP: No respiratory distress, clear to auscultation bilaterally, no wheezes or rales  CV: RRR, +S1S2, no peripheral edema  GI: Soft, NT, ND  PSYCH: A+O x 3, mood and affect appropriate  NEURO: Cooperative, upper and lower motor function grossly intact bilaterally, sensation grossly intact throughout      LABS:                        8.0    12.33 )-----------( 73       ( 09 Jan 2025 04:57 )             24.6     01-09    138  |  100  |  21.3[H]  ----------------------------<  152[H]  3.5   |  29.0  |  0.54    Ca    7.0[L]      09 Jan 2025 04:57  Mg     1.9     01-08    TPro  5.0[L]  /  Alb  2.5[L]  /  TBili  1.8  /  DBili  1.3[H]  /  AST  24  /  ALT  23  /  AlkPhos  238[H]  01-09    PT/INR - ( 09 Jan 2025 04:57 )   PT: 15.2 sec;   INR: 1.31 ratio         PTT - ( 09 Jan 2025 04:57 )  PTT:27.4 sec      Urinalysis Basic - ( 09 Jan 2025 04:57 )    Color: x / Appearance: x / SG: x / pH: x  Gluc: 152 mg/dL / Ketone: x  / Bili: x / Urobili: x   Blood: x / Protein: x / Nitrite: x   Leuk Esterase: x / RBC: x / WBC x   Sq Epi: x / Non Sq Epi: x / Bacteria: x        Culture - Blood (collected 07 Jan 2025 06:19)  Source: .Blood BLOOD  Gram Stain (08 Jan 2025 17:02):    Growth in aerobic bottle: Gram Positive Cocci in Clusters    Growth in anaerobic bottle: Gram Positive Cocci in Clusters  Preliminary Report (09 Jan 2025 11:04):    Growth in aerobic bottle: Staphylococcus aureus    See previous culture [id number]    Growth in anaerobic bottle: Gram Positive Cocci in Clusters    Culture - Blood (collected 07 Jan 2025 06:15)  Source: .Blood BLOOD  Gram Stain (08 Jan 2025 19:40):    Growth in aerobic bottle: Gram Positive Cocci in Clusters    Growth in anaerobic bottle: Gram Positive Cocci in Clusters  Preliminary Report (08 Jan 2025 21:52):    Growth in aerobic bottle: Staphylococcus aureus    See previous culture [id number]    Growth in anaerobic bottle: Gram Positive Cocci in Clusters      CAPILLARY BLOOD GLUCOSE      POCT Blood Glucose.: 206 mg/dL (09 Jan 2025 09:46)  POCT Blood Glucose.: 150 mg/dL (08 Jan 2025 17:33)      IMAGING:

## 2025-01-09 NOTE — CONSULT NOTE ADULT - ASSESSMENT
This is a 38 y/o F w/ PMH of metastatic breast cancer [place] Neg, HER-2 pos on chemotherapy (mets to lung, liver, spleen, spine, bone and brain), gastritis w/ intermittent episodes of dark stool (follows w/ Dr. Rosado GI and is on PPI and octreotide daily) presented for worsening sob over the past few days. Patient was seen in ED 1/2/25 for weakness and sob at which time she was found to have Hb of 5 requiring prbc transfusion and positive for Flu A and started on Tamiflu. Patient's respiratory status has worsened since then w/ productive cough but is unable to describe sputum. Also reported body aches and chills.  GI consulted for rectal bleeding.    Prior endoscopic evaluation  - Colonoscopy on 11/20 showed internal hemorrhoids  - EGD/ colonoscopy (10/2023) at [place] showed antral erythema, colonoscopy poorprep  - VCE on 12/11/23 revealed enteritis  - 1/4/2024 CT A/P (CT Enterography): mild concentric wall thickening involving gastric antrum/pylorus, mild mucosal enhancement cecum and ascending colon with minimal pericolic stranding extending along the right paracolic gutter and posterior plank, generalized colonic fecal retention with mild air distended rectum, no bowel obstruction  - 1/8/2024 EGD: stomach mucosa with localized erythema and congestion was noted in the pre-pyloric region compatible with non-erosive gastritis  - 04/29/24 EGD: There was evidence of small blood vessels in the antrum suggestive of GAVE. After the stomach was completely cleared, using the APC,  GAVE was treated.   - 07/3/24: There were many, minute, diffusely scattered red spots in the antrum. Previously seen GAVE improved/resolved. Scattered stellate scars seen consistent with healing. Moderately erythematous mucosa consistent with gastritis. No source of anemia or possible dark stool on EGD  - EGD 07/31/24: Friability. There were 2 erosions which were oozing. There were small erythematous spots in the antrum as well. In the antrum.  - 09/6/24 EGD/ sigmoidoscopy : Normal EGD; copious amounts of formed liquid brown stool noted. No recent stigmata of bleeding   - 10/30/24 EGD/colonoscopy: Non-erosive gastritis; Normal mucosa in the whole colon. stool in the whole colon  - 11.26.24 Colonoscopy: Normal mucosa in the whole colon. Grade/Stage III internal hemorrhoids.    #hematochezia  #Hx Metastatic breast Ca on chemo  Hgb 6.7gm this morning; s/p 2U pRBCs this morning   Report of large bloody bowel movement this morning   WILLIE: soft brown stool    - Trend CBC, transfuse as needed. Monitor for signs of bleeding.   - Avoid NSAIDs  - diet as tolerated  - IV PPI BID for GI mucosal cytoprotection  - Antiemetics as needed  - No plans for urgent/emergent endoscopic evaluation at this time  - Recommend supportive management at this time   - If patient becomes hemodynamically unstable, requiring multiple transfusions, recommend getting CTA for further evaluation   _________________________________________________________________  Assessment and recommendations are final when note is signed by the attending physician.  This is a 38 y/o F w/ PMH of metastatic breast cancer [place] Neg, HER-2 pos on chemotherapy (mets to lung, liver, spleen, spine, bone and brain), gastritis w/ intermittent episodes of dark stool (follows w/ Dr. Rosado GI and is on PPI and octreotide daily) presented for worsening sob over the past few days. Patient was seen in ED 1/2/25 for weakness and sob at which time she was found to have Hb of 5 requiring prbc transfusion and positive for Flu A and started on Tamiflu. Patient's respiratory status has worsened since then w/ productive cough but is unable to describe sputum. Also reported body aches and chills.  GI consulted for rectal bleeding.    Prior endoscopic evaluation  - Colonoscopy on 11/20 showed internal hemorrhoids  - EGD/ colonoscopy (10/2023) at [place] showed antral erythema, colonoscopy poorprep  - VCE on 12/11/23 revealed enteritis  - 1/4/2024 CT A/P (CT Enterography): mild concentric wall thickening involving gastric antrum/pylorus, mild mucosal enhancement cecum and ascending colon with minimal pericolic stranding extending along the right paracolic gutter and posterior plank, generalized colonic fecal retention with mild air distended rectum, no bowel obstruction  - 1/8/2024 EGD: stomach mucosa with localized erythema and congestion was noted in the pre-pyloric region compatible with non-erosive gastritis  - 04/29/24 EGD: There was evidence of small blood vessels in the antrum suggestive of GAVE. After the stomach was completely cleared, using the APC,  GAVE was treated.   - 07/3/24: There were many, minute, diffusely scattered red spots in the antrum. Previously seen GAVE improved/resolved. Scattered stellate scars seen consistent with healing. Moderately erythematous mucosa consistent with gastritis. No source of anemia or possible dark stool on EGD  - EGD 07/31/24: Friability. There were 2 erosions which were oozing. There were small erythematous spots in the antrum as well. In the antrum.  - 09/6/24 EGD/ sigmoidoscopy : Normal EGD; copious amounts of formed liquid brown stool noted. No recent stigmata of bleeding   - 10/30/24 EGD/colonoscopy: Non-erosive gastritis; Normal mucosa in the whole colon. stool in the whole colon  - 11.26.24 Colonoscopy: Normal mucosa in the whole colon. Grade/Stage III internal hemorrhoids.    #hematochezia  #Hx Metastatic breast Ca on chemo  Hgb 6.7gm this morning; s/p 2U pRBCs this morning   Report of large bloody bowel movement this morning   WILLIE: soft brown stool  Suspect rectal bleeding is due to diverticular bleed which are self-limiting     - Trend CBC, transfuse as needed. Monitor for signs of bleeding.   - Avoid NSAIDs  - diet as tolerated  - IV PPI BID for GI mucosal cytoprotection  - Antiemetics as needed  - No plans for urgent/emergent endoscopic evaluation at this time  - Recommend supportive management at this time   - Avoid constipation   - If patient becomes hemodynamically unstable, requiring multiple transfusions, recommend getting CTA for further evaluation   _________________________________________________________________  Assessment and recommendations are final when note is signed by the attending physician.  This is a 38 y/o F w/ PMH of metastatic breast cancer ER/[place] Neg, HER-2 pos on chemotherapy (mets to lung, liver, spleen, spine, bone and brain), gastritis w/ intermittent episodes of dark stool (follows w/ Dr. Rosado GI and is on PPI and octreotide daily) presented for worsening sob over the past few days. Patient was seen in ED 1/2/25 for weakness and sob at which time she was found to have Hb of 5 requiring prbc transfusion and positive for Flu A and started on Tamiflu. Patient's respiratory status has worsened since then w/ productive cough but is unable to describe sputum. Also reported body aches and chills.  GI consulted for rectal bleeding.    Prior endoscopic evaluation  - Colonoscopy on 11/20 showed internal hemorrhoids  - EGD/ colonoscopy (10/2023) at [place] showed antral erythema, colonoscopy poorprep  - VCE on 12/11/23 revealed enteritis  - 1/4/2024 CT A/P (CT Enterography): mild concentric wall thickening involving gastric antrum/pylorus, mild mucosal enhancement cecum and ascending colon with minimal pericolic stranding extending along the right paracolic gutter and posterior plank, generalized colonic fecal retention with mild air distended rectum, no bowel obstruction  - 1/8/2024 EGD: stomach mucosa with localized erythema and congestion was noted in the pre-pyloric region compatible with non-erosive gastritis  - 04/29/24 EGD: There was evidence of small blood vessels in the antrum suggestive of GAVE. After the stomach was completely cleared, using the APC,  GAVE was treated.   - 07/3/24: There were many, minute, diffusely scattered red spots in the antrum. Previously seen GAVE improved/resolved. Scattered stellate scars seen consistent with healing. Moderately erythematous mucosa consistent with gastritis. No source of anemia or possible dark stool on EGD  - EGD 07/31/24: Friability. There were 2 erosions which were oozing. There were small erythematous spots in the antrum as well. In the antrum.  - 09/6/24 EGD/ sigmoidoscopy : Normal EGD; copious amounts of formed liquid brown stool noted. No recent stigmata of bleeding   - 10/30/24 EGD/colonoscopy: Non-erosive gastritis; Normal mucosa in the whole colon. stool in the whole colon  - 11.26.24 Colonoscopy: Normal mucosa in the whole colon. Grade/Stage III internal hemorrhoids.    #hematochezia  #Hx Metastatic breast Ca on chemo  Hgb 6.7gm this morning; s/p 2U pRBCs this morning   Report of large bloody bowel movement this morning   WILLIE: soft brown stool    - Trend CBC, transfuse as needed. Monitor for signs of bleeding.   - Avoid NSAIDs  - diet as tolerated  - IV PPI BID for GI mucosal cytoprotection  - Antiemetics as needed  - No plans for urgent/emergent endoscopic evaluation at this time  - Recommend supportive management at this time   - Avoid constipation   - If patient becomes hemodynamically unstable, requiring multiple transfusions, recommend getting CTA for further evaluation   _________________________________________________________________  Assessment and recommendations are final when note is signed by the attending physician.

## 2025-01-09 NOTE — PROGRESS NOTE ADULT - ASSESSMENT
39y  Female with h/o metastatic breast cancer ER/[place] Neg, HER-2 + on chemotherapy (last dose 12/26/24) (mets to lung, liver, spleen, spine, bone and brain), gastritis w/ intermittent episodes of dark stool (follows w/ Dr. Rosado GI and is on PPI and octreotide daily). Patient presented 1/5 with c/o worsening SOB.  She was seen in ED 1/2/25 for for weakness and SOB at which time she was found to have Hb of 5 requiring PRBC transfusion and positive for Flu A and started on tamiflu and was discharged from the ED on 1/3/25.  Her respiratory status has worsened since then w/ productive cough associated with body aches and chills.  Denies sick contacts but has had many frequent hospital visits. Patient has been afebrile, no leukocytosis. Was placed on BIPAP for Worsening respiratory status. continued on Tamiflu. Vancomycn and Zosyn was added. Blood cultures with MSSA.     Metastatic breast cancer   - last dose of trastuzumab was on 12/26/24.  -All treatment on hold at the moment.  -Follow up with primary oncologist, Dr. Chapa.    Cytopenias - secondary to malignancy and acute illness.   -Plt improved to 100's now back down to 73k    - likely due to acute illness.  - Hgb 6.9. S/P 1 U PRBC -Possible myelosuppression from sepsis/acute illness. No bleeding reported.  - hgb 8 today  Transfuse if hgb<7.0.      Respiratory failure - multifocal pneumonia and flu+  - On nafcillin, oseltamivir.  - O2 titrated down to 4L O2 NC.   - Management as per primary team.  - MARYANN being considered to rule out endocarditis.  - ID following     Cont aggressive pain management. Currently on PCA pump.    - GI evaluated for large bloody bowel movement  Suspect rectal bleeding is due to diverticular bleed which are self-limiting      Will follow          39y  Female with h/o metastatic breast cancer ER/[place] Neg, HER-2 + on chemotherapy (last dose 12/26/24) (mets to lung, liver, spleen, spine, bone and brain), gastritis w/ intermittent episodes of dark stool (follows w/ Dr. Rsoado GI and is on PPI and octreotide daily). Patient presented 1/5 with c/o worsening SOB.  She was seen in ED 1/2/25 for for weakness and SOB at which time she was found to have Hb of 5 requiring PRBC transfusion and positive for Flu A and started on tamiflu and was discharged from the ED on 1/3/25.  Her respiratory status has worsened since then w/ productive cough associated with body aches and chills.  Denies sick contacts but has had many frequent hospital visits. Patient has been afebrile, no leukocytosis. Was placed on BIPAP for Worsening respiratory status. continued on Tamiflu. Vancomycn and Zosyn was added. Blood cultures with MSSA.     Metastatic breast cancer   - last dose of trastuzumab was on 12/26/24.  -All treatment on hold at the moment.  -Follow up with primary oncologist, Dr. Chapa.    Cytopenias - secondary to malignancy and acute illness.   -Plt improved to 100's now back down to 73k    - likely due to acute illness.  - Hgb 6.9. S/P 3 U PRBC  yesterday  - hgb 8 today  --GI evaluated for large bloody bowel movement  Suspect rectal bleeding is due to diverticular bleed which are self-limiting - If patient becomes hemodynamically unstable, requiring multiple transfusions, recommend getting CTA for further evaluation   Transfuse if hgb<7.0.      Respiratory failure   - multifocal pneumonia and flu+  - ID following   - On nafcillin, oseltamivir.  - 3L O2 via NC  - Management as per primary team.  - TTE 1/6 with no veg  Cardiology following- . No MARYANN at this time until stabilizes and source of bleed ascertained    Cont aggressive pain management. Currently on PCA pump.          Will follow

## 2025-01-10 LAB
ALBUMIN SERPL ELPH-MCNC: 2.2 G/DL — LOW (ref 3.3–5.2)
ALP SERPL-CCNC: 229 U/L — HIGH (ref 40–120)
ALT FLD-CCNC: 22 U/L — SIGNIFICANT CHANGE UP
AMMONIA BLD-MCNC: 90 UMOL/L — HIGH (ref 11–55)
ANION GAP SERPL CALC-SCNC: 9 MMOL/L — SIGNIFICANT CHANGE UP (ref 5–17)
ANISOCYTOSIS BLD QL: SLIGHT — SIGNIFICANT CHANGE UP
AST SERPL-CCNC: 25 U/L — SIGNIFICANT CHANGE UP
BASOPHILS # BLD AUTO: 0 K/UL — SIGNIFICANT CHANGE UP (ref 0–0.2)
BASOPHILS NFR BLD AUTO: 0 % — SIGNIFICANT CHANGE UP (ref 0–2)
BILIRUB SERPL-MCNC: 1.3 MG/DL — SIGNIFICANT CHANGE UP (ref 0.4–2)
BUN SERPL-MCNC: 21.3 MG/DL — HIGH (ref 8–20)
CALCIUM SERPL-MCNC: 7 MG/DL — LOW (ref 8.4–10.5)
CHLORIDE SERPL-SCNC: 104 MMOL/L — SIGNIFICANT CHANGE UP (ref 96–108)
CO2 SERPL-SCNC: 26 MMOL/L — SIGNIFICANT CHANGE UP (ref 22–29)
CREAT SERPL-MCNC: 0.46 MG/DL — LOW (ref 0.5–1.3)
EGFR: 126 ML/MIN/1.73M2 — SIGNIFICANT CHANGE UP
EOSINOPHIL # BLD AUTO: 0 K/UL — SIGNIFICANT CHANGE UP (ref 0–0.5)
EOSINOPHIL NFR BLD AUTO: 0 % — SIGNIFICANT CHANGE UP (ref 0–6)
FUNGITELL: <31 PG/ML — SIGNIFICANT CHANGE UP
GIANT PLATELETS BLD QL SMEAR: PRESENT — SIGNIFICANT CHANGE UP
GLUCOSE BLDC GLUCOMTR-MCNC: 176 MG/DL — HIGH (ref 70–99)
GLUCOSE BLDC GLUCOMTR-MCNC: 186 MG/DL — HIGH (ref 70–99)
GLUCOSE BLDC GLUCOMTR-MCNC: 193 MG/DL — HIGH (ref 70–99)
GLUCOSE BLDC GLUCOMTR-MCNC: 96 MG/DL — SIGNIFICANT CHANGE UP (ref 70–99)
GLUCOSE SERPL-MCNC: 126 MG/DL — HIGH (ref 70–99)
GRAM STN FLD: ABNORMAL
HCT VFR BLD CALC: 24.7 % — LOW (ref 34.5–45)
HCT VFR BLD CALC: 25 % — LOW (ref 34.5–45)
HGB BLD-MCNC: 7.9 G/DL — LOW (ref 11.5–15.5)
HGB BLD-MCNC: 8.3 G/DL — LOW (ref 11.5–15.5)
LYMPHOCYTES # BLD AUTO: 0.44 K/UL — LOW (ref 1–3.3)
LYMPHOCYTES # BLD AUTO: 3.5 % — LOW (ref 13–44)
MAGNESIUM SERPL-MCNC: 1.8 MG/DL — SIGNIFICANT CHANGE UP (ref 1.6–2.6)
MANUAL SMEAR VERIFICATION: SIGNIFICANT CHANGE UP
MCHC RBC-ENTMCNC: 28.5 PG — SIGNIFICANT CHANGE UP (ref 27–34)
MCHC RBC-ENTMCNC: 29 PG — SIGNIFICANT CHANGE UP (ref 27–34)
MCHC RBC-ENTMCNC: 32 G/DL — SIGNIFICANT CHANGE UP (ref 32–36)
MCHC RBC-ENTMCNC: 33.2 G/DL — SIGNIFICANT CHANGE UP (ref 32–36)
MCV RBC AUTO: 87.4 FL — SIGNIFICANT CHANGE UP (ref 80–100)
MCV RBC AUTO: 89.2 FL — SIGNIFICANT CHANGE UP (ref 80–100)
MONOCYTES # BLD AUTO: 0.23 K/UL — SIGNIFICANT CHANGE UP (ref 0–0.9)
MONOCYTES NFR BLD AUTO: 1.8 % — LOW (ref 2–14)
MYELOCYTES NFR BLD: 2.6 % — HIGH (ref 0–0)
NEUTROPHILS # BLD AUTO: 10.9 K/UL — HIGH (ref 1.8–7.4)
NEUTROPHILS NFR BLD AUTO: 83.3 % — HIGH (ref 43–77)
NEUTS BAND # BLD: 3.5 % — SIGNIFICANT CHANGE UP (ref 0–8)
NEUTS BAND NFR BLD: 3.5 % — SIGNIFICANT CHANGE UP (ref 0–8)
NRBC # BLD: 12 /100 WBCS — HIGH (ref 0–0)
NRBC # BLD: 7 /100 WBCS — HIGH (ref 0–0)
NRBC BLD-RTO: 12 /100 WBCS — HIGH (ref 0–0)
NRBC BLD-RTO: 7 /100 WBCS — HIGH (ref 0–0)
PLAT MORPH BLD: NORMAL — SIGNIFICANT CHANGE UP
PLATELET # BLD AUTO: 120 K/UL — LOW (ref 150–400)
PLATELET # BLD AUTO: 76 K/UL — LOW (ref 150–400)
POLYCHROMASIA BLD QL SMEAR: SIGNIFICANT CHANGE UP
POTASSIUM SERPL-MCNC: 3.6 MMOL/L — SIGNIFICANT CHANGE UP (ref 3.5–5.3)
POTASSIUM SERPL-SCNC: 3.6 MMOL/L — SIGNIFICANT CHANGE UP (ref 3.5–5.3)
PROMYELOCYTES # FLD: 5.3 % — HIGH (ref 0–0)
PROMYELOCYTES NFR BLD: 5.3 % — HIGH (ref 0–0)
PROT SERPL-MCNC: 4.7 G/DL — LOW (ref 6.6–8.7)
RBC # BLD: 2.77 M/UL — LOW (ref 3.8–5.2)
RBC # BLD: 2.86 M/UL — LOW (ref 3.8–5.2)
RBC # FLD: 24 % — HIGH (ref 10.3–14.5)
RBC # FLD: 25.1 % — HIGH (ref 10.3–14.5)
RBC BLD AUTO: NORMAL — SIGNIFICANT CHANGE UP
SMUDGE CELLS # BLD: PRESENT — SIGNIFICANT CHANGE UP
SODIUM SERPL-SCNC: 139 MMOL/L — SIGNIFICANT CHANGE UP (ref 135–145)
SPECIMEN SOURCE: SIGNIFICANT CHANGE UP
WBC # BLD: 12.56 K/UL — HIGH (ref 3.8–10.5)
WBC # BLD: 18.73 K/UL — HIGH (ref 3.8–10.5)
WBC # FLD AUTO: 12.56 K/UL — HIGH (ref 3.8–10.5)
WBC # FLD AUTO: 18.73 K/UL — HIGH (ref 3.8–10.5)

## 2025-01-10 PROCEDURE — 99233 SBSQ HOSP IP/OBS HIGH 50: CPT

## 2025-01-10 PROCEDURE — 76937 US GUIDE VASCULAR ACCESS: CPT | Mod: 26

## 2025-01-10 PROCEDURE — 36410 VNPNXR 3YR/> PHY/QHP DX/THER: CPT | Mod: RT,59

## 2025-01-10 PROCEDURE — G0545: CPT

## 2025-01-10 PROCEDURE — 36590 REMOVAL TUNNELED CV CATH: CPT | Mod: RT

## 2025-01-10 PROCEDURE — 71045 X-RAY EXAM CHEST 1 VIEW: CPT | Mod: 26

## 2025-01-10 PROCEDURE — 99232 SBSQ HOSP IP/OBS MODERATE 35: CPT

## 2025-01-10 RX ORDER — ALBUMIN HUMAN 50 G/1000ML
50 SOLUTION INTRAVENOUS
Refills: 0 | Status: COMPLETED | OUTPATIENT
Start: 2025-01-10 | End: 2025-01-11

## 2025-01-10 RX ORDER — LACTULOSE 10 G/15 ML
10 SOLUTION, ORAL ORAL DAILY
Refills: 0 | Status: DISCONTINUED | OUTPATIENT
Start: 2025-01-10 | End: 2025-01-16

## 2025-01-10 RX ADMIN — ANTISEPTIC SURGICAL SCRUB 1 APPLICATION(S): 0.04 SOLUTION TOPICAL at 12:38

## 2025-01-10 RX ADMIN — HYDROMORPHONE HYDROCHLORIDE 30 MILLILITER(S): 4 INJECTION, SOLUTION INTRAMUSCULAR; INTRAVENOUS; SUBCUTANEOUS at 07:17

## 2025-01-10 RX ADMIN — HYDROMORPHONE HYDROCHLORIDE 30 MILLILITER(S): 4 INJECTION, SOLUTION INTRAMUSCULAR; INTRAVENOUS; SUBCUTANEOUS at 18:15

## 2025-01-10 RX ADMIN — HYDROMORPHONE HYDROCHLORIDE 30 MILLILITER(S): 4 INJECTION, SOLUTION INTRAMUSCULAR; INTRAVENOUS; SUBCUTANEOUS at 01:40

## 2025-01-10 RX ADMIN — DEXTROMETHORPHAN HBR AND GUAIFENESIN ORAL SOLUTION 10 MILLILITER(S): 10; 100 LIQUID ORAL at 05:28

## 2025-01-10 RX ADMIN — HYDROMORPHONE HYDROCHLORIDE 30 MILLILITER(S): 4 INJECTION, SOLUTION INTRAMUSCULAR; INTRAVENOUS; SUBCUTANEOUS at 17:00

## 2025-01-10 RX ADMIN — PANTOPRAZOLE 40 MILLIGRAM(S): 20 TABLET, DELAYED RELEASE ORAL at 05:24

## 2025-01-10 RX ADMIN — NAFCILLIN INJECTION 200 GRAM(S): 2 POWDER, FOR SOLUTION INTRAMUSCULAR; INTRAMUSCULAR; INTRAVENOUS at 22:08

## 2025-01-10 RX ADMIN — METHADONE HYDROCHLORIDE 10 MILLIGRAM(S): 5 SOLUTION ORAL at 21:12

## 2025-01-10 RX ADMIN — Medication 40 MILLIGRAM(S): at 11:00

## 2025-01-10 RX ADMIN — Medication 1.25 MILLIGRAM(S): at 08:59

## 2025-01-10 RX ADMIN — Medication 1: at 12:39

## 2025-01-10 RX ADMIN — NAFCILLIN INJECTION 200 GRAM(S): 2 POWDER, FOR SOLUTION INTRAMUSCULAR; INTRAMUSCULAR; INTRAVENOUS at 06:01

## 2025-01-10 RX ADMIN — Medication 4 MILLILITER(S): at 22:07

## 2025-01-10 RX ADMIN — Medication 100 MILLIGRAM(S): at 21:12

## 2025-01-10 RX ADMIN — OCTREOTIDE ACETATE 100 MICROGRAM(S): 1000 INJECTION INTRAVENOUS; SUBCUTANEOUS at 18:32

## 2025-01-10 RX ADMIN — PREGABALIN CAPSULES, CV 200 MILLIGRAM(S): 225 CAPSULE ORAL at 05:24

## 2025-01-10 RX ADMIN — ACETAMINOPHEN, DIPHENHYDRAMINE HCL, PHENYLEPHRINE HCL 3 MILLIGRAM(S): 325; 25; 5 TABLET ORAL at 21:13

## 2025-01-10 RX ADMIN — DEXTROMETHORPHAN HBR AND GUAIFENESIN ORAL SOLUTION 10 MILLILITER(S): 10; 100 LIQUID ORAL at 21:13

## 2025-01-10 RX ADMIN — Medication 4 MILLILITER(S): at 14:48

## 2025-01-10 RX ADMIN — Medication 1.25 MILLIGRAM(S): at 22:07

## 2025-01-10 RX ADMIN — PREGABALIN CAPSULES, CV 200 MILLIGRAM(S): 225 CAPSULE ORAL at 21:13

## 2025-01-10 RX ADMIN — Medication 4 MILLILITER(S): at 03:48

## 2025-01-10 RX ADMIN — NAFCILLIN INJECTION 200 GRAM(S): 2 POWDER, FOR SOLUTION INTRAMUSCULAR; INTRAMUSCULAR; INTRAVENOUS at 11:00

## 2025-01-10 RX ADMIN — NAFCILLIN INJECTION 200 GRAM(S): 2 POWDER, FOR SOLUTION INTRAMUSCULAR; INTRAMUSCULAR; INTRAVENOUS at 02:43

## 2025-01-10 RX ADMIN — Medication 1.25 MILLIGRAM(S): at 14:48

## 2025-01-10 RX ADMIN — Medication 40 MILLIGRAM(S): at 10:59

## 2025-01-10 RX ADMIN — OCTREOTIDE ACETATE 100 MICROGRAM(S): 1000 INJECTION INTRAVENOUS; SUBCUTANEOUS at 06:01

## 2025-01-10 RX ADMIN — HYDROMORPHONE HYDROCHLORIDE 30 MILLILITER(S): 4 INJECTION, SOLUTION INTRAMUSCULAR; INTRAVENOUS; SUBCUTANEOUS at 19:34

## 2025-01-10 RX ADMIN — PANTOPRAZOLE 40 MILLIGRAM(S): 20 TABLET, DELAYED RELEASE ORAL at 19:48

## 2025-01-10 RX ADMIN — ALBUMIN HUMAN 50 MILLILITER(S): 50 SOLUTION INTRAVENOUS at 23:38

## 2025-01-10 RX ADMIN — Medication 4 MILLILITER(S): at 08:59

## 2025-01-10 RX ADMIN — Medication 1: at 06:07

## 2025-01-10 RX ADMIN — OSELTAMIVIR PHOSPHATE 75 MILLIGRAM(S): 75 CAPSULE ORAL at 05:25

## 2025-01-10 RX ADMIN — NAFCILLIN INJECTION 200 GRAM(S): 2 POWDER, FOR SOLUTION INTRAMUSCULAR; INTRAMUSCULAR; INTRAVENOUS at 18:40

## 2025-01-10 RX ADMIN — METHADONE HYDROCHLORIDE 5 MILLIGRAM(S): 5 SOLUTION ORAL at 05:46

## 2025-01-10 RX ADMIN — NAFCILLIN INJECTION 200 GRAM(S): 2 POWDER, FOR SOLUTION INTRAMUSCULAR; INTRAMUSCULAR; INTRAVENOUS at 15:38

## 2025-01-10 RX ADMIN — Medication 1.25 MILLIGRAM(S): at 03:48

## 2025-01-10 NOTE — PROGRESS NOTE ADULT - SUBJECTIVE AND OBJECTIVE BOX
39y  Female with h/o metastatic breast cancer ER/AZ Neg, HER-2 + on chemotherapy (last dose 12/26/24) (mets to lung, liver, spleen, spine, bone and brain), gastritis w/ intermittent episodes of dark stool (follows w/ Dr. Rosado GI and is on PPI and octreotide daily). Patient presented 1/5 with c/o worsening SOB.  She was seen in ED 1/2/25 for for weakness and SOB at which time she was found to have Hb of 5 requiring PRBC transfusion and positive for Flu A and started on tamiflu and was discharged from the ED on 1/3/25.  Her respiratory status has worsened since then w/ productive cough associated with body aches and chills.  Denies sick contacts but has had many frequent hospital visits. Patient has been afebrile, no leukocytosis. Was placed on BIPAP for Worsening respiratory status. continued on Tamiflu. Vancomycn and Zosyn was added. Blood cultures with MSSA.     1/9/25 GI evaluated for large bloody bowel movements  Suspect rectal bleeding is due to diverticular bleed which are self-limiting S/P 3 UPRBC   1/10:  pt sitting in chair.  feeling better. pt had some right chest wall and shoulder pain last night.  pt had dark BM this am.   Tmax 99    MEDICATIONS  (STANDING):  acetylcysteine 10%  Inhalation 4 milliLiter(s) Inhalation every 6 hours  albuterol/ipratropium for Nebulization. 3 milliLiter(s) Nebulizer once  chlorhexidine 2% Cloths 1 Application(s) Topical daily  dextrose 5%. 1000 milliLiter(s) (50 mL/Hr) IV Continuous <Continuous>  dextrose 5%. 1000 milliLiter(s) (100 mL/Hr) IV Continuous <Continuous>  dextrose 50% Injectable 25 Gram(s) IV Push once  dextrose 50% Injectable 12.5 Gram(s) IV Push once  dextrose 50% Injectable 25 Gram(s) IV Push once  furosemide   Injectable 40 milliGRAM(s) IV Push daily  glucagon  Injectable 1 milliGRAM(s) IntraMuscular once  HYDROmorphone PCA (1 mG/mL) 30 milliLiter(s) PCA Continuous PCA Continuous  insulin lispro (ADMELOG) corrective regimen sliding scale   SubCutaneous three times a day before meals  levalbuterol Inhalation 1.25 milliGRAM(s) Inhalation every 6 hours  methadone    Tablet 10 milliGRAM(s) Oral at bedtime  methadone    Tablet 5 milliGRAM(s) Oral <User Schedule>  methylPREDNISolone sodium succinate Injectable 40 milliGRAM(s) IV Push every 24 hours  nafcillin  IVPB 2 Gram(s) IV Intermittent every 4 hours  octreotide  Injectable 100 MICROGram(s) SubCutaneous two times a day  pantoprazole  Injectable 40 milliGRAM(s) IV Push every 12 hours  pregabalin 200 milliGRAM(s) Oral every 8 hours    MEDICATIONS  (PRN):  acetaminophen     Tablet .. 650 milliGRAM(s) Oral every 6 hours PRN Temp greater or equal to 38C (100.4F), Mild Pain (1 - 3)  aluminum hydroxide/magnesium hydroxide/simethicone Suspension 30 milliLiter(s) Oral every 4 hours PRN Dyspepsia  benzonatate 100 milliGRAM(s) Oral every 8 hours PRN Cough  dextrose Oral Gel 15 Gram(s) Oral once PRN Blood Glucose LESS THAN 70 milliGRAM(s)/deciliter  guaifenesin/dextromethorphan Oral Liquid 10 milliLiter(s) Oral every 4 hours PRN Cough  melatonin 3 milliGRAM(s) Oral at bedtime PRN Insomnia  naloxone Injectable 0.1 milliGRAM(s) IV Push every 3 minutes PRN For ANY of the following changes in patient status:  A. RR LESS THAN 10 breaths per minute, B. Oxygen saturation LESS THAN 90%, C. Sedation score of 6  ondansetron Injectable 4 milliGRAM(s) IV Push every 8 hours PRN Nausea and/or Vomiting    Vital Signs Last 24 Hrs  T(C): 36.8 (10 Cristhian 2025 08:27), Max: 37.3 (09 Jan 2025 19:30)  T(F): 98.3 (10 Cristhian 2025 08:27), Max: 99.1 (09 Jan 2025 19:30)  HR: 117 (10 Cristhian 2025 10:00) (82 - 117)  BP: 106/91 (10 Cristhian 2025 10:00) (102/57 - 113/69)  BP(mean): 97 (10 Cristhian 2025 10:00) (72 - 97)  RR: 19 (10 Cristhian 2025 10:00) (18 - 20)  SpO2: 96% (10 Cristhian 2025 10:00) (90% - 100%)    Parameters below as of 10 Cristhian 2025 10:00  Patient On (Oxygen Delivery Method): nasal cannula  O2 Flow (L/min): 4          PE:  NAD  On O2 NC  bilateral rales  abd soft, nd, nt  a/o x 3      CBC                          8.0    12.33 )-----------( 73       ( 09 Jan 2025 04:57 )             24.6                           6.9    11.64 )-----------( 102      ( 08 Jan 2025 06:20 )             22.0       CBC:            7.1    6.03  )-----------( 70       ( 01-07-25 @ 06:15 )             22.1       Chem:       01-09    138  |  100  |  21.3[H]  ----------------------------<  152[H]  3.5   |  29.0  |  0.54    Ca    7.0[L]      09 Jan 2025 04:57  Mg     1.9     01-08    TPro  5.0[L]  /  Alb  2.5[L]  /  TBili  1.8  /  DBili  1.3[H]  /  AST  24  /  ALT  23  /  AlkPhos  238[H]  01-09 01-08    140  |  103  |  17.8  ----------------------------<  112[H]  3.5   |  27.0  |  0.56    Ca    6.8[L]      08 Jan 2025 06:20  Mg     1.9     01-08    TPro  5.4[L]  /  Alb  2.5[L]  /  TBili  1.9  /  DBili  1.3[H]  /  AST  24  /  ALT  25  /  AlkPhos  240[H]  01-08          ( 01-07-25 @ 06:15 )    139  |  106  |  17.5  ----------------------------<  394[H]  3.3[L]   |  20.0[L]  |  0.61    Liver Functions: ( 01-07-25 @ 06:15 )  Alb: 2.6 g/dL / Pro: 5.5 g/dL / ALK PHOS: 228 U/L / ALT: 22 U/L / AST: 16 U/L / GGT: x            Allergies  pertuzumab (Other (Severe))  Perjeta (Other (Severe))       REVIEW OF SYSTEMS:  Limited, patient is on BIPAP  no complaints

## 2025-01-10 NOTE — CHART NOTE - NSCHARTNOTEFT_GEN_A_CORE
PA NOTE-MEDICINE    Called by RN due to Pt experiencing a Large BM with dark red Blood.    VSS Pt asymptomatic  CBC Stat -Will Make sure T & S is current   Pt also c/o Pitting Edema to B/L Legs already on Lasix  Will order Albumin x 3 Units     Continue to Monitor Pt   Will sign out to AM Team PA NOTE-MEDICINE    Called by RN due to Pt experiencing a Large BM with dark red Blood.    VSS Pt asymptomatic  CBC Stat -Will Make sure T & S is current   Pt also c/o Pitting Edema to B/L Legs already on Lasix  B/L LE Venous Dopplers Stat  Will order Albumin x 3 Units     Continue to Monitor Pt   Will sign out to AM Team

## 2025-01-10 NOTE — PROGRESS NOTE ADULT - SUBJECTIVE AND OBJECTIVE BOX
NATIVIDAD MYERS  [id number]      Chief Complaint:  follow up Bacteremia    Interval History:  Patient without specific c/o.  Denies CP, SOB, palps.    Tele:  No events, some sinus tachy      acetaminophen     Tablet .. 650 milliGRAM(s) Oral every 6 hours PRN  acetylcysteine 10%  Inhalation 4 milliLiter(s) Inhalation every 6 hours  albuterol/ipratropium for Nebulization. 3 milliLiter(s) Nebulizer once  aluminum hydroxide/magnesium hydroxide/simethicone Suspension 30 milliLiter(s) Oral every 4 hours PRN  benzonatate 100 milliGRAM(s) Oral every 8 hours PRN  chlorhexidine 2% Cloths 1 Application(s) Topical daily  dextrose 5%. 1000 milliLiter(s) IV Continuous <Continuous>  dextrose 5%. 1000 milliLiter(s) IV Continuous <Continuous>  dextrose 50% Injectable 25 Gram(s) IV Push once  dextrose 50% Injectable 12.5 Gram(s) IV Push once  dextrose 50% Injectable 25 Gram(s) IV Push once  dextrose Oral Gel 15 Gram(s) Oral once PRN  furosemide   Injectable 40 milliGRAM(s) IV Push daily  glucagon  Injectable 1 milliGRAM(s) IntraMuscular once  guaifenesin/dextromethorphan Oral Liquid 10 milliLiter(s) Oral every 4 hours PRN  HYDROmorphone PCA (1 mG/mL) 30 milliLiter(s) PCA Continuous PCA Continuous  insulin lispro (ADMELOG) corrective regimen sliding scale   SubCutaneous three times a day before meals  levalbuterol Inhalation 1.25 milliGRAM(s) Inhalation every 6 hours  melatonin 3 milliGRAM(s) Oral at bedtime PRN  methadone    Tablet 10 milliGRAM(s) Oral at bedtime  methadone    Tablet 5 milliGRAM(s) Oral <User Schedule>  methylPREDNISolone sodium succinate Injectable 40 milliGRAM(s) IV Push every 24 hours  nafcillin  IVPB 2 Gram(s) IV Intermittent every 4 hours  naloxone Injectable 0.1 milliGRAM(s) IV Push every 3 minutes PRN  octreotide  Injectable 100 MICROGram(s) SubCutaneous two times a day  ondansetron Injectable 4 milliGRAM(s) IV Push every 8 hours PRN  pantoprazole  Injectable 40 milliGRAM(s) IV Push every 12 hours  pregabalin 200 milliGRAM(s) Oral every 8 hours          Physical Exam:  T(C): 36.8 (01-10-25 @ 08:27), Max: 37.3 (01-09-25 @ 19:30)  HR: 117 (01-10-25 @ 10:00) (82 - 117)  BP: 106/91 (01-10-25 @ 10:00) (102/57 - 113/69)  RR: 19 (01-10-25 @ 10:00) (18 - 20)  SpO2: 96% (01-10-25 @ 10:00) (90% - 100%)  General: Comfortable in NAD  Neck: No JVD  CVS: nl s1s2, no s3  Pulm: CTA b/l  Abd: soft, non-tender  Ext: No c/c/e  Neuro A&O x3  Psych: Normal affect      Labs:   10 Cristhian 2025 05:02    139    |  104    |  21.3   ----------------------------<  126    3.6     |  26.0   |  0.46     Ca    7.0        10 Cristhian 2025 05:02  Mg     1.8       10 Cristhian 2025 05:02    TPro  4.7    /  Alb  2.2    /  TBili  1.3    /  DBili  x      /  AST  25     /  ALT  22     /  AlkPhos  229    10 Cristhian 2025 05:02                          8.3    12.56 )-----------( 76       ( 10 Cristhian 2025 05:02 )             25.0     PT/INR - ( 09 Jan 2025 04:57 )   PT: 15.2 sec;   INR: 1.31 ratio         PTT - ( 09 Jan 2025 04:57 )  PTT:27.4 sec          ECHO: < from: TTE Limited W or WO Ultrasound Enhancing Agent (01.06.25 @ 12:55) >  1. Technically difficult image quality.   2. Left ventricular systolic function is normal with an ejection fraction visually estimated at 65 to 70 %.   3. Normal right ventricular cavity size, with normal wall thickness, and normal right ventricular systolic function.   4. Compared to the transthoracic echocardiogram performed on 7/21/2024, there have been no significant interval changes        < from: TTE W or WO Ultrasound Enhancing Agent (07.21.24 @ 15:40) >  1. Left ventricular cavity is normal in size. Left ventricular wall thickness is normal. Left ventricular systolic function is normal with an ejection fraction visually estimated at 55 to 60 %.   2. Normal left ventricular diastolic function.   3. Normal right ventricular cavity size and normal right ventricular systolic function.   4. The left atrium is normal in size.   5. The right atrium is normal in size.   6. Trileaflet aortic valve with normal systolic excursion.   7. Structurally normal mitral valve with normal leaflet excursion.   8. Trace mitral regurgitation.   9. Mild tricuspid regurgitation.  10. Estimated pulmonary artery systolic pressure is 23 mmHg, normal pulmonary artery pressure.  11. The interatrial septum appears intact.  12. No pericardial effusion seen.      Assessment:  39y  Female with h/o metastatic breast cancer ER/ID Neg, HER-2 + on chemotherapy (last dose 12/26/24) (mets to lung, liver, spleen, spine, bone and brain), gastritis w/ intermittent episodes of dark stool (follows w/ Dr. Alonzo BISHOP and is on PPI and octreotide daily). Patient presented 1/5 with c/o worsening SOB.  She was seen in ED 1/2/25 for for weakness and SOB at which time she was found to have Hb of 5 requiring PRBC transfusion and positive for Flu A and started on tamiflu and was discharged from the ED on 1/3/25.  Her respiratory status has worsened since then w/ productive cough associated with body aches and chills.  Denies sick contacts but has had many frequent hospital visits. Patient has been afebrile, no leukocytosis. Currently on HFNC for worsening respiratory status. continued on Tamiflu. Vancomycn and Zosyn was added. Blood cultures with MSSA.  Cardiology consultation requested for evaluation of endocarditis.   -Patient with GIB overnight requiring transfusion and drop in Hgb to 6.7  -MARYANN contraindicated now with active GIB.  -GI eval with no plan for scopes 2/2 recently done.  Still with bleeding.  H/H dropped some post PRBCs.    Plan:   1. No MARYANN at this time until bleeding stops and H/H stable.  2. GI and Heme/onc f/u.  3. Abx per ID.    D/w Dr. Ulloa and Dr. Elkins.

## 2025-01-10 NOTE — PROGRESS NOTE ADULT - SUBJECTIVE AND OBJECTIVE BOX
Interval Hx:  Patient seen during rounds  Patient reports pain to be controlled on current medications  Patient denies sedation with medications      Analgesic Dosing for past 24 hours reviewed as below:    methadone    Tablet   10 milliGRAM(s) Oral (01-09-25 @ 21:30)    methadone    Tablet   5 milliGRAM(s) Oral (01-10-25 @ 05:46)   5 milliGRAM(s) Oral (01-09-25 @ 14:55)    ondansetron Injectable   4 milliGRAM(s) IV Push (01-09-25 @ 11:15)    pregabalin   200 milliGRAM(s) Oral (01-10-25 @ 05:24)   200 milliGRAM(s) Oral (01-09-25 @ 21:30)   200 milliGRAM(s) Oral (01-09-25 @ 14:55)          T(C): 36.9 (01-10-25 @ 04:02), Max: 37.3 (01-09-25 @ 19:30)  HR: 103 (01-10-25 @ 03:49) (76 - 114)  BP: 102/57 (01-10-25 @ 04:02) (98/58 - 118/99)  RR: 20 (01-10-25 @ 04:02) (18 - 21)  SpO2: 98% (01-10-25 @ 04:02) (90% - 100%)      01-09-25 @ 07:01  -  01-10-25 @ 07:00  --------------------------------------------------------  IN: 890 mL / OUT: 150 mL / NET: 740 mL        acetaminophen     Tablet .. 650 milliGRAM(s) Oral every 6 hours PRN  acetylcysteine 10%  Inhalation 4 milliLiter(s) Inhalation every 6 hours  albuterol/ipratropium for Nebulization. 3 milliLiter(s) Nebulizer once  aluminum hydroxide/magnesium hydroxide/simethicone Suspension 30 milliLiter(s) Oral every 4 hours PRN  benzonatate 100 milliGRAM(s) Oral every 8 hours PRN  chlorhexidine 2% Cloths 1 Application(s) Topical daily  dextrose 5%. 1000 milliLiter(s) IV Continuous <Continuous>  dextrose 5%. 1000 milliLiter(s) IV Continuous <Continuous>  dextrose 50% Injectable 25 Gram(s) IV Push once  dextrose 50% Injectable 12.5 Gram(s) IV Push once  dextrose 50% Injectable 25 Gram(s) IV Push once  dextrose Oral Gel 15 Gram(s) Oral once PRN  glucagon  Injectable 1 milliGRAM(s) IntraMuscular once  guaifenesin/dextromethorphan Oral Liquid 10 milliLiter(s) Oral every 4 hours PRN  HYDROmorphone PCA (1 mG/mL) 30 milliLiter(s) PCA Continuous PCA Continuous  insulin lispro (ADMELOG) corrective regimen sliding scale   SubCutaneous three times a day before meals  levalbuterol Inhalation 1.25 milliGRAM(s) Inhalation every 6 hours  melatonin 3 milliGRAM(s) Oral at bedtime PRN  methadone    Tablet 10 milliGRAM(s) Oral at bedtime  methadone    Tablet 5 milliGRAM(s) Oral <User Schedule>  methylPREDNISolone sodium succinate Injectable 40 milliGRAM(s) IV Push every 24 hours  nafcillin  IVPB 2 Gram(s) IV Intermittent every 4 hours  naloxone Injectable 0.1 milliGRAM(s) IV Push every 3 minutes PRN  octreotide  Injectable 100 MICROGram(s) SubCutaneous two times a day  ondansetron Injectable 4 milliGRAM(s) IV Push every 8 hours PRN  pantoprazole  Injectable 40 milliGRAM(s) IV Push every 12 hours  pregabalin 200 milliGRAM(s) Oral every 8 hours                          8.3    12.56 )-----------( 76       ( 10 Cristhian 2025 05:02 )             25.0     01-10    139  |  104  |  21.3[H]  ----------------------------<  126[H]  3.6   |  26.0  |  0.46[L]    Ca    7.0[L]      10 Cristhian 2025 05:02  Mg     1.8     01-10    TPro  4.7[L]  /  Alb  2.2[L]  /  TBili  1.3  /  DBili  x   /  AST  25  /  ALT  22  /  AlkPhos  229[H]  01-10    PT/INR - ( 09 Jan 2025 04:57 )   PT: 15.2 sec;   INR: 1.31 ratio         PTT - ( 09 Jan 2025 04:57 )  PTT:27.4 sec  Urinalysis Basic - ( 10 Cristhian 2025 05:02 )    Color: x / Appearance: x / SG: x / pH: x  Gluc: 126 mg/dL / Ketone: x  / Bili: x / Urobili: x   Blood: x / Protein: x / Nitrite: x   Leuk Esterase: x / RBC: x / WBC x   Sq Epi: x / Non Sq Epi: x / Bacteria: x        Pain Service   [phone]

## 2025-01-10 NOTE — PROGRESS NOTE ADULT - ASSESSMENT
39y  Female with h/o metastatic breast cancer ER/NC Neg, HER-2 + on chemotherapy (last dose 12/26/24) (mets to lung, liver, spleen, spine, bone and brain), gastritis w/ intermittent episodes of dark stool (follows w/ Dr. Rosado GI and is on PPI and octreotide daily). Patient presented 1/5 with c/o worsening SOB.  She was seen in ED 1/2/25 for for weakness and SOB at which time she was found to have Hb of 5 requiring PRBC transfusion and positive for Flu A and started on tamiflu and was discharged from the ED on 1/3/25.  Her respiratory status has worsened since then w/ productive cough associated with body aches and chills.  Denies sick contacts but has had many frequent hospital visits. Patient has been afebrile, no leukocytosis. Was placed on BIPAP for Worsening respiratory status. continued on Tamiflu. Vancomycn and Zosyn was added. Blood cultures with MSSA.     Metastatic breast cancer   - last dose of trastuzumab was on 12/26/24.  -All treatment on hold at the moment.  -Follow up with primary oncologist, Dr. Chapa.    Cytopenias - secondary to malignancy and acute illness.   -Plt improved to 100's now back down to 73k    - likely due to acute illness.  - Hgb 6.9. S/P 3 U PRBC  yesterday  - hgb 8.3 today  --GI evaluated for large bloody bowel movement  Suspect rectal bleeding is due to diverticular bleed which are self-limiting - If patient becomes hemodynamically unstable, requiring multiple transfusions, recommend getting CTA for further evaluation   Transfuse if hgb<7.0.    - GI f/u noted.  no plans for scope.  Previously scoped in Nov. 2024. internal hemorrhoids, gastric erosions, no active bleeding site     Respiratory failure   - multifocal pneumonia and flu+  - ID following   - On nafcillin, oseltamivir.  - 3L O2 via NC  - Management as per primary team.  - TTE 1/6 with no veg  Cardiology following- . No MARYANN at this time until stabilizes and source of bleed ascertained    MSSA Bacteremia  - cultures persistenly positive. pt afebrile.    - d/w ID.  suggested removing Port.  Agree with port removal and placement of PICC or midline for outpt chemo       Cont aggressive pain management. Currently on PCA pump.      Will follow     Niraj Elkins MD  [facility]  [phone]          39y  Female with h/o metastatic breast cancer ER/CO Neg, HER-2 + on chemotherapy (last dose 12/26/24) (mets to lung, liver, spleen, spine, bone and brain), gastritis w/ intermittent episodes of dark stool (follows w/ Dr. Rosado GI and is on PPI and octreotide daily). Patient presented 1/5 with c/o worsening SOB.  She was seen in ED 1/2/25 for for weakness and SOB at which time she was found to have Hb of 5 requiring PRBC transfusion and positive for Flu A and started on tamiflu and was discharged from the ED on 1/3/25.  Her respiratory status has worsened since then w/ productive cough associated with body aches and chills.  Denies sick contacts but has had many frequent hospital visits. Patient has been afebrile, no leukocytosis. Was placed on BIPAP for Worsening respiratory status. continued on Tamiflu. Vancomycn and Zosyn was added. Blood cultures with MSSA.     Metastatic breast cancer   - last dose of trastuzumab was on 12/26/24.  -All treatment on hold at the moment.  -Follow up with primary oncologist, Dr. Chapa.    Cytopenias - secondary to malignancy and acute illness.   -Plt improved to 100's now back down to 73k    - likely due to acute illness.  - Hgb 6.9. S/P 3 U PRBC  yesterday  - hgb 8.3 today  --GI evaluated for large bloody bowel movement  Suspect rectal bleeding is due to diverticular bleed which are self-limiting - If patient becomes hemodynamically unstable, requiring multiple transfusions, recommend getting CTA for further evaluation   Transfuse if hgb<7.0.    - GI f/u noted.  no plans for scope.  Previously scoped in Nov. 2024. internal hemorrhoids, gastric erosions, no active bleeding site     Respiratory failure   - multifocal pneumonia and flu+  - ID following   - On nafcillin, oseltamivir.  - 3L O2 via NC  - Management as per primary team.  - TTE 1/6 with no veg  Cardiology following- . No MARYANN at this time until stabilizes and source of bleed ascertained    MSSA Bacteremia  - cultures persistenly positive. pt afebrile.    - d/w ID.  suggested removing Port.  Agree with port removal and placement of PICC or midline prior to d/c for outpt chemo       Cont aggressive pain management. Currently on PCA pump.      Will follow     Niraj Elkins MD  [facility]  [phone]

## 2025-01-10 NOTE — PROGRESS NOTE ADULT - SUBJECTIVE AND OBJECTIVE BOX
[facility] Division of Medicine    SUBJECTIVE / OVERNIGHT EVENTS: No overnight events as per RN. Pt seen at the bedside. Denies any new complaints. Still has cough thats unchanged. All other systems reviewed and are negative.    MEDICATIONS  (STANDING):  acetylcysteine 10%  Inhalation 4 milliLiter(s) Inhalation every 6 hours  albuterol/ipratropium for Nebulization. 3 milliLiter(s) Nebulizer once  chlorhexidine 2% Cloths 1 Application(s) Topical daily  dextrose 5%. 1000 milliLiter(s) (50 mL/Hr) IV Continuous <Continuous>  dextrose 5%. 1000 milliLiter(s) (100 mL/Hr) IV Continuous <Continuous>  dextrose 50% Injectable 25 Gram(s) IV Push once  dextrose 50% Injectable 12.5 Gram(s) IV Push once  dextrose 50% Injectable 25 Gram(s) IV Push once  furosemide   Injectable 40 milliGRAM(s) IV Push daily  glucagon  Injectable 1 milliGRAM(s) IntraMuscular once  HYDROmorphone PCA (1 mG/mL) 30 milliLiter(s) PCA Continuous PCA Continuous  insulin lispro (ADMELOG) corrective regimen sliding scale   SubCutaneous three times a day before meals  levalbuterol Inhalation 1.25 milliGRAM(s) Inhalation every 6 hours  methadone    Tablet 10 milliGRAM(s) Oral at bedtime  methadone    Tablet 5 milliGRAM(s) Oral <User Schedule>  methylPREDNISolone sodium succinate Injectable 40 milliGRAM(s) IV Push every 24 hours  nafcillin  IVPB 2 Gram(s) IV Intermittent every 4 hours  octreotide  Injectable 100 MICROGram(s) SubCutaneous two times a day  pantoprazole  Injectable 40 milliGRAM(s) IV Push every 12 hours  pregabalin 200 milliGRAM(s) Oral every 8 hours    MEDICATIONS  (PRN):  acetaminophen     Tablet .. 650 milliGRAM(s) Oral every 6 hours PRN Temp greater or equal to 38C (100.4F), Mild Pain (1 - 3)  aluminum hydroxide/magnesium hydroxide/simethicone Suspension 30 milliLiter(s) Oral every 4 hours PRN Dyspepsia  benzonatate 100 milliGRAM(s) Oral every 8 hours PRN Cough  dextrose Oral Gel 15 Gram(s) Oral once PRN Blood Glucose LESS THAN 70 milliGRAM(s)/deciliter  guaifenesin/dextromethorphan Oral Liquid 10 milliLiter(s) Oral every 4 hours PRN Cough  melatonin 3 milliGRAM(s) Oral at bedtime PRN Insomnia  naloxone Injectable 0.1 milliGRAM(s) IV Push every 3 minutes PRN For ANY of the following changes in patient status:  A. RR LESS THAN 10 breaths per minute, B. Oxygen saturation LESS THAN 90%, C. Sedation score of 6  ondansetron Injectable 4 milliGRAM(s) IV Push every 8 hours PRN Nausea and/or Vomiting      I&O's Summary    09 Jan 2025 07:01  -  10 Cristhian 2025 07:00  --------------------------------------------------------  IN: 890 mL / OUT: 150 mL / NET: 740 mL        acetaminophen     Tablet .. 650 milliGRAM(s) Oral every 6 hours PRN  acetylcysteine 10%  Inhalation 4 milliLiter(s) Inhalation every 6 hours  albuterol/ipratropium for Nebulization. 3 milliLiter(s) Nebulizer once  aluminum hydroxide/magnesium hydroxide/simethicone Suspension 30 milliLiter(s) Oral every 4 hours PRN  benzonatate 100 milliGRAM(s) Oral every 8 hours PRN  chlorhexidine 2% Cloths 1 Application(s) Topical daily  dextrose 5%. 1000 milliLiter(s) IV Continuous <Continuous>  dextrose 5%. 1000 milliLiter(s) IV Continuous <Continuous>  dextrose 50% Injectable 25 Gram(s) IV Push once  dextrose 50% Injectable 12.5 Gram(s) IV Push once  dextrose 50% Injectable 25 Gram(s) IV Push once  dextrose Oral Gel 15 Gram(s) Oral once PRN  furosemide   Injectable 40 milliGRAM(s) IV Push daily  glucagon  Injectable 1 milliGRAM(s) IntraMuscular once  guaifenesin/dextromethorphan Oral Liquid 10 milliLiter(s) Oral every 4 hours PRN  HYDROmorphone PCA (1 mG/mL) 30 milliLiter(s) PCA Continuous PCA Continuous  insulin lispro (ADMELOG) corrective regimen sliding scale   SubCutaneous three times a day before meals  levalbuterol Inhalation 1.25 milliGRAM(s) Inhalation every 6 hours  melatonin 3 milliGRAM(s) Oral at bedtime PRN  methadone    Tablet 10 milliGRAM(s) Oral at bedtime  methadone    Tablet 5 milliGRAM(s) Oral <User Schedule>  methylPREDNISolone sodium succinate Injectable 40 milliGRAM(s) IV Push every 24 hours  nafcillin  IVPB 2 Gram(s) IV Intermittent every 4 hours  naloxone Injectable 0.1 milliGRAM(s) IV Push every 3 minutes PRN  octreotide  Injectable 100 MICROGram(s) SubCutaneous two times a day  ondansetron Injectable 4 milliGRAM(s) IV Push every 8 hours PRN  pantoprazole  Injectable 40 milliGRAM(s) IV Push every 12 hours  pregabalin 200 milliGRAM(s) Oral every 8 hours      PHYSICAL EXAM:  Vital Signs Last 24 Hrs  T(C): 36.8 (10 Cristhian 2025 08:27), Max: 37.3 (09 Jan 2025 19:30)  T(F): 98.3 (10 Cristhian 2025 08:27), Max: 99.1 (09 Jan 2025 19:30)  HR: 117 (10 Cristhian 2025 10:00) (82 - 117)  BP: 106/91 (10 Cristhian 2025 10:00) (102/57 - 113/69)  BP(mean): 97 (10 Cristhian 2025 10:00) (72 - 97)  RR: 19 (10 Cristhian 2025 10:00) (18 - 20)  SpO2: 96% (10 Cristhian 2025 10:00) (90% - 100%)    Parameters below as of 10 Cristhian 2025 10:00  Patient On (Oxygen Delivery Method): nasal cannula  O2 Flow (L/min): 4      CONSTITUTIONAL: no apparent distress  RESP: No respiratory distress, clear to auscultation bilaterally, no wheezes or rales  CV: RRR, +S1S2, no peripheral edema  GI: Soft, NT, ND  PSYCH: A+O x 3, mood and affect appropriate  NEURO: Cooperative, upper and lower motor function grossly intact bilaterally, sensation grossly intact throughout        LABS:                        8.3    12.56 )-----------( 76       ( 10 Cristhian 2025 05:02 )             25.0     01-10    139  |  104  |  21.3[H]  ----------------------------<  126[H]  3.6   |  26.0  |  0.46[L]    Ca    7.0[L]      10 Cristhian 2025 05:02  Mg     1.8     01-10    TPro  4.7[L]  /  Alb  2.2[L]  /  TBili  1.3  /  DBili  x   /  AST  25  /  ALT  22  /  AlkPhos  229[H]  01-10    PT/INR - ( 09 Jan 2025 04:57 )   PT: 15.2 sec;   INR: 1.31 ratio         PTT - ( 09 Jan 2025 04:57 )  PTT:27.4 sec      Urinalysis Basic - ( 10 Cristhian 2025 05:02 )    Color: x / Appearance: x / SG: x / pH: x  Gluc: 126 mg/dL / Ketone: x  / Bili: x / Urobili: x   Blood: x / Protein: x / Nitrite: x   Leuk Esterase: x / RBC: x / WBC x   Sq Epi: x / Non Sq Epi: x / Bacteria: x        Culture - Blood (collected 09 Jan 2025 04:57)  Source: .Blood BLOOD  Gram Stain (10 Cristhian 2025 03:37):    Growth in aerobic bottle: Gram Positive Cocci in Clusters  Preliminary Report (10 Cristhian 2025 03:37):    Growth in aerobic bottle: Gram Positive Cocci in Clusters    Culture - Blood (collected 08 Jan 2025 06:20)  Source: .Blood BLOOD  Preliminary Report (09 Jan 2025 13:01):    No growth at 24 hours      CAPILLARY BLOOD GLUCOSE      POCT Blood Glucose.: 186 mg/dL (10 Cristhian 2025 06:04)  POCT Blood Glucose.: 131 mg/dL (09 Jan 2025 18:07)  POCT Blood Glucose.: 162 mg/dL (09 Jan 2025 12:57)      IMAGING:

## 2025-01-10 NOTE — PROCEDURE NOTE - PROCEDURE FINDINGS AND DETAILS
right chest port with anchor sutures removed  sent for cx.  non purulent   closed with lose interrupted 2-0 prolene sutures.   daily dressing changes

## 2025-01-10 NOTE — PROGRESS NOTE ADULT - ASSESSMENT
39y  Female with h/o metastatic breast cancer ER/NV Neg, HER-2 + on chemotherapy (last dose 12/26/24) (mets to lung, liver, spleen, spine, bone and brain), gastritis w/ intermittent episodes of dark stool (follows w/ Dr. Rosado GI and is on PPI and octreotide daily). Patient presented 1/5 with c/o worsening SOB.  She was seen in ED 1/2/25 for for weakness and SOB at which time she was found to have Hb of 5 requiring PRBC transfusion and positive for Flu A and started on tamiflu and was discharged from the ED on 1/3/25.  Her respiratory status has worsened since then w/ productive cough associated with body aches and chills.  Denies sick contacts but has had many frequent hospital visits. Patient has been afebrile, no leukocytosis. Was placed on BIPAP for Worsening respiratory status. continued on Tamiflu. Vancomycn and Zosyn was added. Blood cultures with MSSA. ID input requested.       MSSA bacteremia   Staphylococcus aureus PNA   Influenza A   metastatic breast cancer ER/NV Neg, HER-2 + on chemotherapy   Port in place       - Blood cultures 1/5, 1/7, 1/9 reporting MSSA    - Repeat blood cultures ordered for q 48hours next one will be 1/11/25  - Sputum Cx 1/6 Staphylococcus aureus  - Urine Cx 1/5 reporting 10k - 49k Escherichia coli  of ? significance  since UA not concerning for UTI   - RVP/COVID 19 PCR + Influenza A  - CTA Chest reporting No acute pulmonary embolism. Wide spread patchy airspace opacities bilaterally, increased since the prior exam.  - US RUQ reporting No evidence of acute cholecystitis or biliary ductal dilatation.  - Procalcitonin level 2.32  - TTE 1/6 with no veg  - Called cardiology consult for MARYANN  - GI eval noted, pending possible EGD   - Would remove Port given persistent Bacteremia   - Continue oseltamivir 75mg [id number] till 1/10/25  - Continue Nafcillin 2gm IV [id number]  - Hold off on PICC/Midline for now unless needed for reasons other than infectious diseases  - Follow up cultures  - Trend Fever  - Trend WBC      Thank you for allowing me to participate in the care of your patient.   Will Follow    Discussed treatment plan with: Cardiology. Dr Bryant/Jumana NP, Dr Elkins, Dr Eaton and Clinical pharmacy

## 2025-01-10 NOTE — PROGRESS NOTE ADULT - ASSESSMENT
40 y/o F w/ PMH of metastatic breast cancer ER/MO Neg, HER-2 pos on chemotherapy (mets to lung, liver, spleen, spine, bone and brain), ?GAVE related bleeding (follows w/ Dr. Rosado GI and is on PPI and octreotide daily) presented for worsening sob over the past few days.  Pt was seen in ED 1/2/25 for for weakness and sob at which time she was found to have Hb of 5 requiring prbc transfusion and positive for Flu A and started on tamiflu.  Pts respirostyr status has worsened since then w/ productive cough but is unable to desribe sputum.  Pt tachycardic, tachypneic w/ O2 at 90% ORA and placed on 2L NC w/ improved saturation.  Clinically toxic appearing speaking 2-3 word sentences w/ course scattered ronchi on exam and expiratory insp/exp scattered wheezing.   Initial w/u significant for leukopenia, thrombocytopenia, RVP +FlA, CTA chest negative for PE but found to have multifocal pna.   Recievied vanco/zosyn and 1550cc IVNS bolus in ED.  [facility] consulted and will admit for sepsis 2/2 multifocal PNA      Sepsis 2/2 Flu A w/ superimposed multifocal PNA  Acute hypoxic respiratory failure 2/2 Flu A w/ superimposed multifocal PNA   - on NC, comfortable  - persistently positive RVP for flu A  - c/w tamiflu through 1/10  - repeat CT 1/8 shows improved pleural effusion but worsening GGOs, unclear if infectious or malignant etiology  - repeat CT 1/9 with similar findings  - discussed with pulm   - repeat CT next week to evaluate for improvement of infiltrates  - start IV lasix 40mg qd x 2 days given recent blood transfusion  - c/w IV steroids, tapered to q24hr 1/9, continue over the weekend and change to PO if remains stable   - c/w abx per ID recs  - sputum clx growing moderate staph aureus   - TTE EF 65-70%, no WMA    MSSA bacteremia  - blood clx positive 1/5, repeat in process  - ID following  - c/w nafcillin   - needs MARYANN, likely monday if no more GI bleeding  - discussed with ID, may need chemo port removal, will follow up    Chronic normocytic anemia 2/2 metastatic breast cancer on chemo and 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   Thrombocytopenia likely 2/2 sepsis   Breast cancer ER/MO Neg, HER-2 pos on chemotherapy w/ mets to lung, liver, spleen, spine, bone and brain  - Baseline Hb ranges 8-9    - s/p prbc transfusion on 1/2, 1/8 and 1/9  - no bleeding reported overnight  - trend CBC, transfuse for hgb <7  - c/w home sub q octreotide and IV protonix  - GI consulted, suspect likely diverticular bleed and will likely resolved without intervention, monitor vitals and CBC  - Plts likely low from sepsis and no intervention required at this time  - Monitor CBC and transfuse for Hb<7 and plts<20K in setting of sepsis   - [facility] following  - monitor on tele and     Breast cancer ER/MO Neg, HER-2 pos on chemotherapy w/ mets to lung, liver, spleen, spine, bone and brain  - c/w pregabalin, methylphenidate (confirmed on ISTOP Reference #: [id number])  - Reports being on methadone for pain but last 30 day script on istop dispensed 09/19/24  - Is on po dilaudid 4mg prn w/ last script dispensed 12/04/24   - now on PCA pump  - pain management following, revert back to PO Dilaudid regimen on d/c       VTE ppx: SCDs, avoid chemical VTE ppx given known brain mets, place on SCDs instead     Dispo: Acute. Anticipated LOS unclear    Discussed with pt's sister Arianne, please call her every day with updates

## 2025-01-10 NOTE — PROGRESS NOTE ADULT - ASSESSMENT
38 y/o female with h/o metastatic breast cancer , mets to brain. MRI brain shows new hemorrhagic mets. Pain management consulted for pain management.    Plan:  - dPCA 0/0.35/8/6 (increase demand dose to 0.35 mg)  - C/w home methadone 5/5/10    When due for discharge:  - DC PCA  - Recommend starting dilaudid PO 4mg/8mg [id number] PRN mod/severe pain     40 y/o female with h/o metastatic breast cancer , mets to brain. MRI brain shows new hemorrhagic mets. Pain management consulted for pain management.    Plan:  - C/w dPCA 0/0.35/8/6  - C/w home methadone 5/5/10    When due for discharge:  - DC PCA  - Recommend starting dilaudid PO 4mg/8mg [id number] PRN mod/severe pain

## 2025-01-10 NOTE — PROGRESS NOTE ADULT - SUBJECTIVE AND OBJECTIVE BOX
[facility]  INFECTIOUS DISEASES at [place] / [place] / [place]  =======================================================                              Salazar Toro MD                              Professor Emeritus:  Dr Warner Mcintosh MD            Diplomates American Board of Internal Medicine & Infectious Diseases                                   Tel  [phone] Fax [phone]                                  Hospital Consult line:  [phone]  =======================================================      [place] [id number]    Follow up: MSSA bacteremia    No fevers       Allergies:  pertuzumab (Other (Severe))  Perjeta (Other (Severe))        REVIEW OF SYSTEMS:  CONSTITUTIONAL:  No Fever or chills  HEENT:   No diplopia or blurred vision.  No earache, sore throat or runny nose.  CARDIOVASCULAR:  No Chest Pain  RESPIRATORY:  No cough, shortness of breath  GASTROINTESTINAL:  No nausea, vomiting or diarrhea.  GENITOURINARY:  No dysuria, frequency or urgency. No Blood in urine  MUSCULOSKELETAL:  no joint aches, no muscle pain  SKIN:  No change in skin, hair or nails.  NEUROLOGIC:  No Headaches      Physical Exam:  GEN: NAD  HEENT: normocephalic and atraumatic.    NECK: Supple.   LUNGS: CTA B/L.  HEART: RRR  ABDOMEN: Soft, NT, ND.  +BS.    : No CVA tenderness  EXTREMITIES: Without  edema.  MSK: No joint swelling  NEUROLOGIC: No Focal Deficits   SKIN: No rash  + PORT      Vitals:  T(F): 98.3 (10 Cristhian 2025 08:27), Max: 99.1 (09 Jan 2025 19:30)  HR: 112 (10 Cristhian 2025 11:02)  BP: 114/82 (10 Cristhian 2025 11:02)  RR: 18 (10 Cristhian 2025 11:02)  SpO2: 96% (10 Cristhian 2025 11:02) (94% - 100%)  temp max in last 48H T(F): , Max: 99.1 (01-09-25 @ 19:30)    Current Antibiotics:  nafcillin  IVPB 2 Gram(s) IV Intermittent every 4 hours    Other medications:  acetylcysteine 10%  Inhalation 4 milliLiter(s) Inhalation every 6 hours  albuterol/ipratropium for Nebulization. 3 milliLiter(s) Nebulizer once  chlorhexidine 2% Cloths 1 Application(s) Topical daily  dextrose 5%. 1000 milliLiter(s) IV Continuous <Continuous>  dextrose 5%. 1000 milliLiter(s) IV Continuous <Continuous>  dextrose 50% Injectable 25 Gram(s) IV Push once  dextrose 50% Injectable 12.5 Gram(s) IV Push once  dextrose 50% Injectable 25 Gram(s) IV Push once  furosemide   Injectable 40 milliGRAM(s) IV Push daily  glucagon  Injectable 1 milliGRAM(s) IntraMuscular once  HYDROmorphone PCA (1 mG/mL) 30 milliLiter(s) PCA Continuous PCA Continuous  insulin lispro (ADMELOG) corrective regimen sliding scale   SubCutaneous three times a day before meals  levalbuterol Inhalation 1.25 milliGRAM(s) Inhalation every 6 hours  methadone    Tablet 10 milliGRAM(s) Oral at bedtime  methadone    Tablet 5 milliGRAM(s) Oral <User Schedule>  methylPREDNISolone sodium succinate Injectable 40 milliGRAM(s) IV Push every 24 hours  octreotide  Injectable 100 MICROGram(s) SubCutaneous two times a day  pantoprazole  Injectable 40 milliGRAM(s) IV Push every 12 hours  pregabalin 200 milliGRAM(s) Oral every 8 hours                            8.3    12.56 )-----------( 76       ( 10 Cristhian 2025 05:02 )             25.0     01-10    139  |  104  |  21.3[H]  ----------------------------<  126[H]  3.6   |  26.0  |  0.46[L]    Ca    7.0[L]      10 Cristhian 2025 05:02  Mg     1.8     01-10    TPro  4.7[L]  /  Alb  2.2[L]  /  TBili  1.3  /  DBili  x   /  AST  25  /  ALT  22  /  AlkPhos  229[H]  01-10    RECENT CULTURES:  01-09 @ 04:57 .Blood BLOOD     Growth in aerobic bottle: Gram Positive Cocci in Clusters  Growth in aerobic bottle: Gram Positive Cocci in Clusters    01-08 @ 06:20 .Blood BLOOD     No growth at 24 hours    01-07 @ 06:19 .Blood BLOOD     Growth in aerobic and anaerobic bottles: Staphylococcus aureus  See previous culture [id number]  Growth in aerobic bottle: Gram Positive Cocci in Clusters  Growth in anaerobic bottle: Gram Positive Cocci in Clusters    01-07 @ 06:15 .Blood BLOOD     Growth in aerobic and anaerobic bottles: Staphylococcus aureus  See previous culture [id number]  Growth in aerobic bottle: Gram Positive Cocci in Clusters  Growth in anaerobic bottle: Gram Positive Cocci in Clusters    01-06 @ 01:34 .Sputum Sputum Staphylococcus aureus    Moderate Staphylococcus aureus  Commensal sylvester consistent with body site  Moderate Squamous epithelial cells seen per low power field  Moderate polymorphonuclear leukocytes seen per low power field  Few Gram Negative Rods seen per oil power field  Few Gram positive cocci in pairs seen per oil power field  Rare Gram Positive Rods seen per oil power field  Rare Yeast like cells seen per oil power field    01-05 @ 12:34 Clean Catch Clean Catch (Midstream) Escherichia coli    10,000 - 49,000 CFU/mL Escherichia coli  <10,000 CFU/mL Normal Urogenital Sylvester    01-05 @ 10:54 .Blood BLOOD Blood Culture PCR  Staphylococcus aureus    Growth in aerobic and anaerobic bottles: Staphylococcus aureus  Direct identification is available within approximately 3-5  hours either by Blood Panel Multiplexed PCR or Direct  MALDI-TOF. Details: [email or web address]  Growth in anaerobic bottle: Gram Positive Cocci in Clusters  Growth in aerobic bottle: Gram Positive Cocci in Clusters      WBC Count: 12.56 K/uL (01-10-25 @ 05:02)  WBC Count: 12.45 K/uL (01-09-25 @ 20:00)  WBC Count: 13.35 K/uL (01-09-25 @ 14:38)  WBC Count: 12.33 K/uL (01-09-25 @ 04:57)  WBC Count: 11.98 K/uL (01-09-25 @ 00:08)  WBC Count: 11.64 K/uL (01-08-25 @ 06:20)  WBC Count: 7.43 K/uL (01-07-25 @ 12:23)  WBC Count: 6.03 K/uL (01-07-25 @ 06:15)  WBC Count: 6.51 K/uL (01-07-25 @ 02:54)  WBC Count: 2.96 K/uL (01-06-25 @ 07:36)  WBC Count: 2.66 K/uL (01-06-25 @ 06:00)  WBC Count: 2.97 K/uL (01-05-25 @ 19:27)    Creatinine: 0.46 mg/dL (01-10-25 @ 05:02)  Creatinine: 0.54 mg/dL (01-09-25 @ 04:57)  Creatinine: 0.56 mg/dL (01-08-25 @ 06:20)  Creatinine: 0.61 mg/dL (01-07-25 @ 06:15)  Creatinine: 0.49 mg/dL (01-06-25 @ 07:36)    Procalcitonin: 1.37 ng/mL (01-08-25 @ 06:20)  Procalcitonin: 2.32 ng/mL (01-07-25 @ 06:15)     SARS-CoV-2: NotDetec (01-05-25 @ 10:54)  SARS-CoV-2 Result: NotDetec (01-02-25 @ 19:20)          Influenza AH3 (RapRVP): Detected (01.05.25 @ 10:54)    Urinalysis + Microscopic Examination (01.06.25 @ 03:00)    pH Urine: 6.0   Urine Appearance: Clear   Color: Yellow   Specific Gravity: 1.015   Protein, Urine: Trace mg/dL   Glucose Qualitative, Urine: Negative mg/dL   Ketone - Urine: Negative mg/dL   Blood, Urine: Small   Bilirubin: Negative   Urobilinogen: 1.0 mg/dL   Leukocyte Esterase Concentration: Small   Nitrite: Negative   White Blood Cell - Urine: 6 /HPF   Red Blood Cell - Urine: 5 /HPF   Bacteria: Occasional /HPF   Epithelial Cells: 2 /HPF      < from: TTE Limited W or WO Ultrasound Enhancing Agent (01.06.25 @ 12:55) >  CONCLUSIONS:      1. Technically difficult image quality.   2. Left ventricular systolic function is normal with an ejection fraction visually estimated at 65 to 70 %.   3. Normal right ventricular cavity size, with normal wall thickness, and normal right ventricular systolic function.   4. Compared to the transthoracic echocardiogram performed on 7/21/2024, there have been no significant interval changes.    < end of copied text >

## 2025-01-10 NOTE — PROCEDURE NOTE - PLAN
patient will need daily dressing changes (dry gauze and Tegaderm) for two weeks  f/u with IR as opt in 7-10 days for wound check (call for appt ).  continue ABX as per ID (will need opt f/u)    when cleared by ID, can consider placement of new access (if needed by oncology)

## 2025-01-11 DIAGNOSIS — A41.9 SEPSIS, UNSPECIFIED ORGANISM: ICD-10-CM

## 2025-01-11 DIAGNOSIS — C50.919 MALIGNANT NEOPLASM OF UNSPECIFIED SITE OF UNSPECIFIED FEMALE BREAST: ICD-10-CM

## 2025-01-11 DIAGNOSIS — J10.00 INFLUENZA DUE TO OTHER IDENTIFIED INFLUENZA VIRUS WITH UNSPECIFIED TYPE OF PNEUMONIA: ICD-10-CM

## 2025-01-11 LAB
-  CLINDAMYCIN: SIGNIFICANT CHANGE UP
-  ERYTHROMYCIN: SIGNIFICANT CHANGE UP
-  GENTAMICIN: SIGNIFICANT CHANGE UP
-  OXACILLIN: SIGNIFICANT CHANGE UP
-  PENICILLIN: SIGNIFICANT CHANGE UP
-  RIFAMPIN: SIGNIFICANT CHANGE UP
-  TETRACYCLINE: SIGNIFICANT CHANGE UP
-  TRIMETHOPRIM/SULFAMETHOXAZOLE: SIGNIFICANT CHANGE UP
-  VANCOMYCIN: SIGNIFICANT CHANGE UP
ALBUMIN SERPL ELPH-MCNC: 2.8 G/DL — LOW (ref 3.3–5.2)
ALP SERPL-CCNC: 172 U/L — HIGH (ref 40–120)
ALT FLD-CCNC: 14 U/L — SIGNIFICANT CHANGE UP
AMMONIA BLD-MCNC: 69 UMOL/L — HIGH (ref 11–55)
ANION GAP SERPL CALC-SCNC: 13 MMOL/L — SIGNIFICANT CHANGE UP (ref 5–17)
AST SERPL-CCNC: 15 U/L — SIGNIFICANT CHANGE UP
BILIRUB DIRECT SERPL-MCNC: 0.8 MG/DL — HIGH (ref 0–0.3)
BILIRUB INDIRECT FLD-MCNC: 0.3 MG/DL — SIGNIFICANT CHANGE UP (ref 0.2–1)
BILIRUB SERPL-MCNC: 1.1 MG/DL — SIGNIFICANT CHANGE UP (ref 0.4–2)
BUN SERPL-MCNC: 17.3 MG/DL — SIGNIFICANT CHANGE UP (ref 8–20)
CALCIUM SERPL-MCNC: 7.7 MG/DL — LOW (ref 8.4–10.5)
CHLORIDE SERPL-SCNC: 101 MMOL/L — SIGNIFICANT CHANGE UP (ref 96–108)
CO2 SERPL-SCNC: 28 MMOL/L — SIGNIFICANT CHANGE UP (ref 22–29)
CREAT SERPL-MCNC: 0.47 MG/DL — LOW (ref 0.5–1.3)
CULTURE RESULTS: ABNORMAL
CULTURE RESULTS: ABNORMAL
EGFR: 125 ML/MIN/1.73M2 — SIGNIFICANT CHANGE UP
GLUCOSE BLDC GLUCOMTR-MCNC: 119 MG/DL — HIGH (ref 70–99)
GLUCOSE BLDC GLUCOMTR-MCNC: 154 MG/DL — HIGH (ref 70–99)
GLUCOSE BLDC GLUCOMTR-MCNC: 167 MG/DL — HIGH (ref 70–99)
GLUCOSE BLDC GLUCOMTR-MCNC: 179 MG/DL — HIGH (ref 70–99)
GLUCOSE SERPL-MCNC: 144 MG/DL — HIGH (ref 70–99)
HCT VFR BLD CALC: 16.1 % — CRITICAL LOW (ref 34.5–45)
HCT VFR BLD CALC: 22.6 % — LOW (ref 34.5–45)
HGB BLD-MCNC: 5.2 G/DL — CRITICAL LOW (ref 11.5–15.5)
HGB BLD-MCNC: 7.7 G/DL — LOW (ref 11.5–15.5)
MCHC RBC-ENTMCNC: 29.2 PG — SIGNIFICANT CHANGE UP (ref 27–34)
MCHC RBC-ENTMCNC: 29.3 PG — SIGNIFICANT CHANGE UP (ref 27–34)
MCHC RBC-ENTMCNC: 32.3 G/DL — SIGNIFICANT CHANGE UP (ref 32–36)
MCHC RBC-ENTMCNC: 34.1 G/DL — SIGNIFICANT CHANGE UP (ref 32–36)
MCV RBC AUTO: 85.9 FL — SIGNIFICANT CHANGE UP (ref 80–100)
MCV RBC AUTO: 90.4 FL — SIGNIFICANT CHANGE UP (ref 80–100)
METHOD TYPE: SIGNIFICANT CHANGE UP
NRBC # BLD: 3 /100 WBCS — HIGH (ref 0–0)
NRBC # BLD: 4 /100 WBCS — HIGH (ref 0–0)
NRBC BLD-RTO: 3 /100 WBCS — HIGH (ref 0–0)
NRBC BLD-RTO: 4 /100 WBCS — HIGH (ref 0–0)
ORGANISM # SPEC MICROSCOPIC CNT: ABNORMAL
ORGANISM # SPEC MICROSCOPIC CNT: SIGNIFICANT CHANGE UP
PLATELET # BLD AUTO: 58 K/UL — LOW (ref 150–400)
PLATELET # BLD AUTO: 71 K/UL — LOW (ref 150–400)
POTASSIUM SERPL-MCNC: 3.1 MMOL/L — LOW (ref 3.5–5.3)
POTASSIUM SERPL-SCNC: 3.1 MMOL/L — LOW (ref 3.5–5.3)
PROT SERPL-MCNC: 4.8 G/DL — LOW (ref 6.6–8.7)
RBC # BLD: 1.78 M/UL — LOW (ref 3.8–5.2)
RBC # BLD: 2.63 M/UL — LOW (ref 3.8–5.2)
RBC # FLD: 20 % — HIGH (ref 10.3–14.5)
RBC # FLD: 24.9 % — HIGH (ref 10.3–14.5)
SODIUM SERPL-SCNC: 142 MMOL/L — SIGNIFICANT CHANGE UP (ref 135–145)
SPECIMEN SOURCE: SIGNIFICANT CHANGE UP
SPECIMEN SOURCE: SIGNIFICANT CHANGE UP
WBC # BLD: 10.18 K/UL — SIGNIFICANT CHANGE UP (ref 3.8–10.5)
WBC # BLD: 6.78 K/UL — SIGNIFICANT CHANGE UP (ref 3.8–10.5)
WBC # FLD AUTO: 10.18 K/UL — SIGNIFICANT CHANGE UP (ref 3.8–10.5)
WBC # FLD AUTO: 6.78 K/UL — SIGNIFICANT CHANGE UP (ref 3.8–10.5)

## 2025-01-11 PROCEDURE — 93970 EXTREMITY STUDY: CPT | Mod: 26

## 2025-01-11 PROCEDURE — 99233 SBSQ HOSP IP/OBS HIGH 50: CPT

## 2025-01-11 PROCEDURE — 99232 SBSQ HOSP IP/OBS MODERATE 35: CPT

## 2025-01-11 RX ORDER — POTASSIUM CHLORIDE 750 MG/1
40 TABLET, EXTENDED RELEASE ORAL ONCE
Refills: 0 | Status: COMPLETED | OUTPATIENT
Start: 2025-01-11 | End: 2025-01-11

## 2025-01-11 RX ADMIN — Medication 1: at 18:06

## 2025-01-11 RX ADMIN — PREGABALIN CAPSULES, CV 200 MILLIGRAM(S): 225 CAPSULE ORAL at 21:46

## 2025-01-11 RX ADMIN — PREGABALIN CAPSULES, CV 200 MILLIGRAM(S): 225 CAPSULE ORAL at 14:51

## 2025-01-11 RX ADMIN — OCTREOTIDE ACETATE 100 MICROGRAM(S): 1000 INJECTION INTRAVENOUS; SUBCUTANEOUS at 06:12

## 2025-01-11 RX ADMIN — OCTREOTIDE ACETATE 100 MICROGRAM(S): 1000 INJECTION INTRAVENOUS; SUBCUTANEOUS at 18:06

## 2025-01-11 RX ADMIN — Medication 1.25 MILLIGRAM(S): at 21:59

## 2025-01-11 RX ADMIN — NAFCILLIN INJECTION 200 GRAM(S): 2 POWDER, FOR SOLUTION INTRAMUSCULAR; INTRAMUSCULAR; INTRAVENOUS at 17:39

## 2025-01-11 RX ADMIN — Medication 40 MILLIGRAM(S): at 05:34

## 2025-01-11 RX ADMIN — HYDROMORPHONE HYDROCHLORIDE 30 MILLILITER(S): 4 INJECTION, SOLUTION INTRAMUSCULAR; INTRAVENOUS; SUBCUTANEOUS at 07:28

## 2025-01-11 RX ADMIN — PREGABALIN CAPSULES, CV 200 MILLIGRAM(S): 225 CAPSULE ORAL at 05:26

## 2025-01-11 RX ADMIN — DEXTROMETHORPHAN HBR AND GUAIFENESIN ORAL SOLUTION 10 MILLILITER(S): 10; 100 LIQUID ORAL at 21:47

## 2025-01-11 RX ADMIN — HYDROMORPHONE HYDROCHLORIDE 30 MILLILITER(S): 4 INJECTION, SOLUTION INTRAMUSCULAR; INTRAVENOUS; SUBCUTANEOUS at 19:35

## 2025-01-11 RX ADMIN — NAFCILLIN INJECTION 200 GRAM(S): 2 POWDER, FOR SOLUTION INTRAMUSCULAR; INTRAMUSCULAR; INTRAVENOUS at 12:36

## 2025-01-11 RX ADMIN — NAFCILLIN INJECTION 200 GRAM(S): 2 POWDER, FOR SOLUTION INTRAMUSCULAR; INTRAMUSCULAR; INTRAVENOUS at 06:12

## 2025-01-11 RX ADMIN — NAFCILLIN INJECTION 200 GRAM(S): 2 POWDER, FOR SOLUTION INTRAMUSCULAR; INTRAMUSCULAR; INTRAVENOUS at 21:49

## 2025-01-11 RX ADMIN — POTASSIUM CHLORIDE 40 MILLIEQUIVALENT(S): 750 TABLET, EXTENDED RELEASE ORAL at 14:51

## 2025-01-11 RX ADMIN — Medication 4 MILLILITER(S): at 14:43

## 2025-01-11 RX ADMIN — Medication 4 MILLILITER(S): at 05:28

## 2025-01-11 RX ADMIN — ACETAMINOPHEN, DIPHENHYDRAMINE HCL, PHENYLEPHRINE HCL 3 MILLIGRAM(S): 325; 25; 5 TABLET ORAL at 21:46

## 2025-01-11 RX ADMIN — Medication 1: at 12:39

## 2025-01-11 RX ADMIN — METHADONE HYDROCHLORIDE 5 MILLIGRAM(S): 5 SOLUTION ORAL at 05:35

## 2025-01-11 RX ADMIN — Medication 1.25 MILLIGRAM(S): at 14:43

## 2025-01-11 RX ADMIN — PANTOPRAZOLE 40 MILLIGRAM(S): 20 TABLET, DELAYED RELEASE ORAL at 05:33

## 2025-01-11 RX ADMIN — ONDANSETRON 4 MILLIGRAM(S): 4 TABLET, ORALLY DISINTEGRATING ORAL at 05:39

## 2025-01-11 RX ADMIN — ALBUMIN HUMAN 50 MILLILITER(S): 50 SOLUTION INTRAVENOUS at 02:04

## 2025-01-11 RX ADMIN — ALBUMIN HUMAN 50 MILLILITER(S): 50 SOLUTION INTRAVENOUS at 00:37

## 2025-01-11 RX ADMIN — Medication 1: at 09:22

## 2025-01-11 RX ADMIN — Medication 100 MILLIGRAM(S): at 21:46

## 2025-01-11 RX ADMIN — METHADONE HYDROCHLORIDE 10 MILLIGRAM(S): 5 SOLUTION ORAL at 21:46

## 2025-01-11 RX ADMIN — Medication 4 MILLILITER(S): at 08:44

## 2025-01-11 RX ADMIN — Medication 1.25 MILLIGRAM(S): at 08:44

## 2025-01-11 RX ADMIN — Medication 40 MILLIGRAM(S): at 09:23

## 2025-01-11 RX ADMIN — PANTOPRAZOLE 40 MILLIGRAM(S): 20 TABLET, DELAYED RELEASE ORAL at 18:06

## 2025-01-11 RX ADMIN — HYDROMORPHONE HYDROCHLORIDE 30 MILLILITER(S): 4 INJECTION, SOLUTION INTRAMUSCULAR; INTRAVENOUS; SUBCUTANEOUS at 09:59

## 2025-01-11 RX ADMIN — Medication 4 MILLILITER(S): at 21:58

## 2025-01-11 RX ADMIN — NAFCILLIN INJECTION 200 GRAM(S): 2 POWDER, FOR SOLUTION INTRAMUSCULAR; INTRAMUSCULAR; INTRAVENOUS at 03:17

## 2025-01-11 RX ADMIN — METHADONE HYDROCHLORIDE 5 MILLIGRAM(S): 5 SOLUTION ORAL at 14:51

## 2025-01-11 RX ADMIN — ANTISEPTIC SURGICAL SCRUB 1 APPLICATION(S): 0.04 SOLUTION TOPICAL at 12:41

## 2025-01-11 RX ADMIN — Medication 1.25 MILLIGRAM(S): at 05:28

## 2025-01-11 NOTE — PROGRESS NOTE ADULT - SUBJECTIVE AND OBJECTIVE BOX
NATIVIDAD MYERS  [id number]        Chief Complaint:  follow up Bacteremia      Subjective: no new complaints, port removed      24 hour Tele: SR         acetaminophen     Tablet .. 650 milliGRAM(s) Oral every 6 hours PRN  acetylcysteine 10%  Inhalation 4 milliLiter(s) Inhalation every 6 hours  albuterol/ipratropium for Nebulization. 3 milliLiter(s) Nebulizer once  aluminum hydroxide/magnesium hydroxide/simethicone Suspension 30 milliLiter(s) Oral every 4 hours PRN  benzonatate 100 milliGRAM(s) Oral every 8 hours PRN  chlorhexidine 2% Cloths 1 Application(s) Topical daily  dextrose 5%. 1000 milliLiter(s) IV Continuous <Continuous>  dextrose 5%. 1000 milliLiter(s) IV Continuous <Continuous>  dextrose 50% Injectable 25 Gram(s) IV Push once  dextrose 50% Injectable 12.5 Gram(s) IV Push once  dextrose 50% Injectable 25 Gram(s) IV Push once  dextrose Oral Gel 15 Gram(s) Oral once PRN  furosemide   Injectable 40 milliGRAM(s) IV Push daily  glucagon  Injectable 1 milliGRAM(s) IntraMuscular once  guaifenesin/dextromethorphan Oral Liquid 10 milliLiter(s) Oral every 4 hours PRN  HYDROmorphone PCA (1 mG/mL) 30 milliLiter(s) PCA Continuous PCA Continuous  insulin lispro (ADMELOG) corrective regimen sliding scale   SubCutaneous three times a day before meals  lactulose Syrup 10 Gram(s) Oral daily  levalbuterol Inhalation 1.25 milliGRAM(s) Inhalation every 6 hours  melatonin 3 milliGRAM(s) Oral at bedtime PRN  methadone    Tablet 10 milliGRAM(s) Oral at bedtime  methadone    Tablet 5 milliGRAM(s) Oral <User Schedule>  methylPREDNISolone sodium succinate Injectable 40 milliGRAM(s) IV Push every 24 hours  nafcillin  IVPB 2 Gram(s) IV Intermittent every 4 hours  naloxone Injectable 0.1 milliGRAM(s) IV Push every 3 minutes PRN  octreotide  Injectable 100 MICROGram(s) SubCutaneous two times a day  ondansetron Injectable 4 milliGRAM(s) IV Push every 8 hours PRN  pantoprazole  Injectable 40 milliGRAM(s) IV Push every 12 hours  pregabalin 200 milliGRAM(s) Oral every 8 hours          Physical Exam:  T(C): 36.8 (01-11-25 @ 08:48), Max: 37.1 (01-11-25 @ 04:02)  HR: 98 (01-11-25 @ 08:48) (79 - 119)  BP: 95/58 (01-11-25 @ 08:48) (95/58 - 117/81)  RR: 18 (01-11-25 @ 08:48) (15 - 19)  SpO2: 100% (01-11-25 @ 08:48) (94% - 100%)  Wt(kg): --  General: Comfortable in NAD, pale   HEENT: MMM, sclera anicteric  Resp: CTA bilaterally  CVS: nl s1s2, rrr, no s3/JVD  Abd: soft, NT, ND  Ext: No c/c/e  Neuro A&O x3  Psych: Normal affect    I&O's Summary    10 Cristhian 2025 07:01  -  11 Jan 2025 07:00  --------------------------------------------------------  IN: 200 mL / OUT: 600 mL / NET: -400 mL    11 Jan 2025 07:01  -  11 Jan 2025 09:08  --------------------------------------------------------  IN: 333 mL / OUT: 800 mL / NET: -467 mL          Labs:   11 Jan 2025 05:20    142    |  101    |  17.3   ----------------------------<  144    3.1     |  28.0   |  0.47     Ca    7.7        11 Jan 2025 05:20  Mg     1.8       10 Cristhian 2025 05:02    TPro  4.8    /  Alb  2.8    /  TBili  1.1    /  DBili  0.8    /  AST  15     /  ALT  14     /  AlkPhos  172    11 Jan 2025 05:20                          5.2    6.78  )-----------( 58       ( 11 Jan 2025 05:20 )             16.1           ECHO: < from: TTE Limited W or WO Ultrasound Enhancing Agent (01.06.25 @ 12:55) >  1. Technically difficult image quality.   2. Left ventricular systolic function is normal with an ejection fraction visually estimated at 65 to 70 %.   3. Normal right ventricular cavity size, with normal wall thickness, and normal right ventricular systolic function.   4. Compared to the transthoracic echocardiogram performed on 7/21/2024, there have been no significant interval changes        < from: TTE W or WO Ultrasound Enhancing Agent (07.21.24 @ 15:40) >  1. Left ventricular cavity is normal in size. Left ventricular wall thickness is normal. Left ventricular systolic function is normal with an ejection fraction visually estimated at 55 to 60 %.   2. Normal left ventricular diastolic function.   3. Normal right ventricular cavity size and normal right ventricular systolic function.   4. The left atrium is normal in size.   5. The right atrium is normal in size.   6. Trileaflet aortic valve with normal systolic excursion.   7. Structurally normal mitral valve with normal leaflet excursion.   8. Trace mitral regurgitation.   9. Mild tricuspid regurgitation.  10. Estimated pulmonary artery systolic pressure is 23 mmHg, normal pulmonary artery pressure.  11. The interatrial septum appears intact.  12. No pericardial effusion seen.      Assessment:  39y  Female with h/o metastatic breast cancer ER/IL Neg, HER-2 + on chemotherapy (last dose 12/26/24) (mets to lung, liver, spleen, spine, bone and brain), gastritis w/ intermittent episodes of dark stool (follows w/ Dr. Rosado GI and is on PPI and octreotide daily). Patient presented 1/5 with c/o worsening SOB.  She was seen in ED 1/2/25 for for weakness and SOB at which time she was found to have Hb of 5 requiring PRBC transfusion and positive for Flu A and started on tamiflu and was discharged from the ED on 1/3/25.  Her respiratory status has worsened since then w/ productive cough associated with body aches and chills.  Denies sick contacts but has had many frequent hospital visits. Patient has been afebrile, no leukocytosis. Currently on HFNC for worsening respiratory status. continued on Tamiflu. Vancomycn and Zosyn was added. Blood cultures with MSSA.  Cardiology consultation requested for evaluation of endocarditis.   Per ID:- Blood cultures 1/5, 1/7, 1/9 reporting MSSA , Repeat blood cultures ordered for q 48hours next one will be 1/11/25, Sputum Cx 1/6 Staphylococcus aureus  - Urine Cx 1/5 reporting 10k - 49k Escherichia coli  of ? significance  since UA not concerning for UTI   - RVP/COVID 19 PCR + Influenza A    -Patient with GIB requiring transfusion from 1/8/2025 and drop in Hgb to 6.7, remains low with continued blood in stool  -MARYANN contraindicated now with active GIB.  -GI eval with no plan for scopes 2/2 recently done.  Still with bleeding.  H/H dropped some post PRBCs. Long GI history, ? GAVE disease per GI notes and prior EGD . H as had multiple EGD/colonoscopies in past as well. Per their current recommendations CTA is planned if recurrent bleeding    Plan: (TO BE SEEN)  1. No MARYANN at this time until bleeding stops and H/H stable.  2. GI and Heme/onc f/u.  3. Abx per ID. Repeat blood cultures planned , Port removed   4.       Zhang Dunn MD NATIVIDAD MYERS  [id number]        Chief Complaint:  follow up Bacteremia      Subjective:  port removed, mild pain right side of chest      24 hour Tele: SR         acetaminophen     Tablet .. 650 milliGRAM(s) Oral every 6 hours PRN  acetylcysteine 10%  Inhalation 4 milliLiter(s) Inhalation every 6 hours  albuterol/ipratropium for Nebulization. 3 milliLiter(s) Nebulizer once  aluminum hydroxide/magnesium hydroxide/simethicone Suspension 30 milliLiter(s) Oral every 4 hours PRN  benzonatate 100 milliGRAM(s) Oral every 8 hours PRN  chlorhexidine 2% Cloths 1 Application(s) Topical daily  dextrose 5%. 1000 milliLiter(s) IV Continuous <Continuous>  dextrose 5%. 1000 milliLiter(s) IV Continuous <Continuous>  dextrose 50% Injectable 25 Gram(s) IV Push once  dextrose 50% Injectable 12.5 Gram(s) IV Push once  dextrose 50% Injectable 25 Gram(s) IV Push once  dextrose Oral Gel 15 Gram(s) Oral once PRN  furosemide   Injectable 40 milliGRAM(s) IV Push daily  glucagon  Injectable 1 milliGRAM(s) IntraMuscular once  guaifenesin/dextromethorphan Oral Liquid 10 milliLiter(s) Oral every 4 hours PRN  HYDROmorphone PCA (1 mG/mL) 30 milliLiter(s) PCA Continuous PCA Continuous  insulin lispro (ADMELOG) corrective regimen sliding scale   SubCutaneous three times a day before meals  lactulose Syrup 10 Gram(s) Oral daily  levalbuterol Inhalation 1.25 milliGRAM(s) Inhalation every 6 hours  melatonin 3 milliGRAM(s) Oral at bedtime PRN  methadone    Tablet 10 milliGRAM(s) Oral at bedtime  methadone    Tablet 5 milliGRAM(s) Oral <User Schedule>  methylPREDNISolone sodium succinate Injectable 40 milliGRAM(s) IV Push every 24 hours  nafcillin  IVPB 2 Gram(s) IV Intermittent every 4 hours  naloxone Injectable 0.1 milliGRAM(s) IV Push every 3 minutes PRN  octreotide  Injectable 100 MICROGram(s) SubCutaneous two times a day  ondansetron Injectable 4 milliGRAM(s) IV Push every 8 hours PRN  pantoprazole  Injectable 40 milliGRAM(s) IV Push every 12 hours  pregabalin 200 milliGRAM(s) Oral every 8 hours          Physical Exam:  T(C): 36.8 (01-11-25 @ 08:48), Max: 37.1 (01-11-25 @ 04:02)  HR: 98 (01-11-25 @ 08:48) (79 - 119)  BP: 95/58 (01-11-25 @ 08:48) (95/58 - 117/81)  RR: 18 (01-11-25 @ 08:48) (15 - 19)  SpO2: 100% (01-11-25 @ 08:48) (94% - 100%)  Wt(kg): --  General: Comfortable in NAD, pale   HEENT: MMM, sclera anicteric  Resp: CTA bilaterally  CVS: nl s1s2, rrr, no s3/JVD  Abd: soft, NT, ND  Ext: No c/c/e  Neuro A&O x3, tremulous   Psych: Normal affect    I&O's Summary    10 Cristhian 2025 07:01  -  11 Jan 2025 07:00  --------------------------------------------------------  IN: 200 mL / OUT: 600 mL / NET: -400 mL    11 Jan 2025 07:01  -  11 Jan 2025 09:08  --------------------------------------------------------  IN: 333 mL / OUT: 800 mL / NET: -467 mL          Labs:   11 Jan 2025 05:20    142    |  101    |  17.3   ----------------------------<  144    3.1     |  28.0   |  0.47     Ca    7.7        11 Jan 2025 05:20  Mg     1.8       10 Cristhian 2025 05:02    TPro  4.8    /  Alb  2.8    /  TBili  1.1    /  DBili  0.8    /  AST  15     /  ALT  14     /  AlkPhos  172    11 Jan 2025 05:20                          5.2    6.78  )-----------( 58       ( 11 Jan 2025 05:20 )             16.1           ECHO: < from: TTE Limited W or WO Ultrasound Enhancing Agent (01.06.25 @ 12:55) >  1. Technically difficult image quality.   2. Left ventricular systolic function is normal with an ejection fraction visually estimated at 65 to 70 %.   3. Normal right ventricular cavity size, with normal wall thickness, and normal right ventricular systolic function.   4. Compared to the transthoracic echocardiogram performed on 7/21/2024, there have been no significant interval changes        < from: TTE W or WO Ultrasound Enhancing Agent (07.21.24 @ 15:40) >  1. Left ventricular cavity is normal in size. Left ventricular wall thickness is normal. Left ventricular systolic function is normal with an ejection fraction visually estimated at 55 to 60 %.   2. Normal left ventricular diastolic function.   3. Normal right ventricular cavity size and normal right ventricular systolic function.   4. The left atrium is normal in size.   5. The right atrium is normal in size.   6. Trileaflet aortic valve with normal systolic excursion.   7. Structurally normal mitral valve with normal leaflet excursion.   8. Trace mitral regurgitation.   9. Mild tricuspid regurgitation.  10. Estimated pulmonary artery systolic pressure is 23 mmHg, normal pulmonary artery pressure.  11. The interatrial septum appears intact.  12. No pericardial effusion seen.      Assessment:  39y  Female with h/o metastatic breast cancer ER/VA Neg, HER-2 + on chemotherapy (last dose 12/26/24) (mets to lung, liver, spleen, spine, bone and brain), gastritis w/ intermittent episodes of dark stool (follows w/ Dr. Rosaod GI and is on PPI and octreotide daily). Patient presented 1/5 with c/o worsening SOB.  She was seen in ED 1/2/25 for for weakness and SOB at which time she was found to have Hb of 5 requiring PRBC transfusion and positive for Flu A and started on tamiflu and was discharged from the ED on 1/3/25.  Her respiratory status has worsened since then w/ productive cough associated with body aches and chills.  Denies sick contacts but has had many frequent hospital visits. Patient has been afebrile, no leukocytosis. Currently on HFNC for worsening respiratory status. continued on Tamiflu. Vancomycn and Zosyn was added. Blood cultures with MSSA.  Cardiology consultation requested for evaluation of endocarditis.   Per ID:- Blood cultures 1/5, 1/7, 1/9 reporting MSSA , Repeat blood cultures ordered for q 48hours next one will be 1/11/25, Sputum Cx 1/6 Staphylococcus aureus  - Urine Cx 1/5 reporting 10k - 49k Escherichia coli  of ? significance  since UA not concerning for UTI   - RVP/COVID 19 PCR + Influenza A    -Patient with GIB requiring transfusion from 1/8/2025 and drop in Hgb to 6.7, remains low with continued blood in stool  -MARYANN contraindicated now with active GIB.  -GI eval with no plan for scopes 2/2 recently done.  Still with bleeding.  H/H dropped some post PRBCs. Long GI history, ? GAVE disease per GI notes and prior EGD . H as had multiple EGD/colonoscopies in past as well. Per their current recommendations CTA is planned if recurrent bleeding  -Right sided CP non cardiac, muscular     Plan:   1. No MARYANN at this time until bleeding stops and H/H stable. Repeat blood cultures pending, if negative now that port is removed may not need MARYANN  2. GI and Heme/onc f/u.  3. Abx per ID. Repeat blood cultures planned , Port removed       Zhang uDnn MD

## 2025-01-11 NOTE — CHART NOTE - NSCHARTNOTEFT_GEN_A_CORE
PA NOTE-MEDICINE    Called by RN with critical lab Value: H/H 5.2/16.1.  T & S still active   Consent on Chart  VSS   Pt Asymptomatic   Stat Ordered 2 U PRBC x 3 Hrs Each   Repeat CBC ordered for 15:00   Continue to monitor pt   Recall PA/MD for any changes in Pt Status   Will sign out to AM Team to Follow Up

## 2025-01-11 NOTE — PROGRESS NOTE ADULT - ASSESSMENT
39y  Female with h/o metastatic breast cancer ER/RI Neg, HER-2 + on chemotherapy (last dose 12/26/24) (mets to lung, liver, spleen, spine, bone and brain), gastritis w/ intermittent episodes of dark stool (follows w/ Dr. Rosado GI and is on PPI and octreotide daily). Patient presented 1/5 with c/o worsening SOB.  She was seen in ED 1/2/25 for for weakness and SOB at which time she was found to have Hb of 5 requiring PRBC transfusion and positive for Flu A and started on tamiflu and was discharged from the ED on 1/3/25.  Her respiratory status has worsened since then w/ productive cough associated with body aches and chills. Admitted for sepsis ,acute hypoxic respiratory failure with flu A. Currently on HFNC for worsening respiratory status.S/P  Tamiflu. Vancomycn and Zosyn was added. Blood cultures with MSSA.  Cardiology consultation requested for evaluation of endocarditis.   Blood cultures 1/5, 1/7, 1/9 reporting MSSA , Repeat blood cultures ordered for q 48hours next one will be 1/11/25, Sputum Cx 1/6 Staphylococcus aureus. MARYANN hold for active gib requring transfusion. Pt had large BM likley diverticular bleed. Had recent scopy. Gi following.  Urine Cx 1/5 reporting 10k - 49k Escherichia coli  of ? significance  since UA not concerning for UTI.  RVP/COVID 19 PCR + Influenza A.      Acute blood loss anemia   GIB  -Patient with GIB requiring transfusion from 1/8/2025 and drop in Hgb to 5.2 am,  with continued blood in stool  -PRBC on going. repeat cbc after finish transfusion  -PPI/OCTRIO  -GI eval with no plan for scopes 2/2 recently done.  Still with bleeding.  reed GI history, ? GAVE disease per GI notes and prior EGD . H as had multiple EGD/colonoscopies in past as well. Per their current recommendations CTA is planned if recurrent bleeding. WILL F/U GI REC      Sepsis 2/2 Flu A w/ superimposed multifocal PNA  Acute hypoxic respiratory failure 2/2 Flu A w/ superimposed multifocal PNA   - on NC, comfortable  - persistently positive RVP for flu A  - c/w tamiflu through 1/10  - repeat CT 1/8 shows improved pleural effusion but worsening GGOs, unclear if infectious or malignant etiology  - repeat CT 1/9 with similar findings  - discussed with pulm   - repeat CT next week to evaluate for improvement of infiltrates  - start IV lasix 40mg qd x 2 days given recent blood transfusion  - c/w IV steroids, tapered to q24hr 1/9, continue over the weekend and change to PO if remains stable   - c/w abx per ID recs  - sputum clx growing moderate staph aureus   - TTE EF 65-70%, no WMA    MSSA bacteremia  - blood clx positive 1/5, repeat in process  - ID following  - c/w nafcillin   - MARYANN on hold for gib. 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   Thrombocytopenia likely 2/2 sepsis   Breast cancer ER/RI Neg, HER-2 pos on chemotherapy w/ mets to lung, liver, spleen, spine, bone and brain  - Baseline Hb ranges 8-9    - s/p prbc transfusion on 1/2, 1/8 and 1/9. prbc Today  - no bleeding reported overnight  - trend CBC, transfuse for hgb <7  - c/w home sub q octreotide and IV protonix  - GI consulted, suspect likely diverticular bleed and will likely resolved without intervention, monitor vitals and CBC  - Plts likely low from sepsis and no intervention required at this time  - Monitor CBC and transfuse for Hb<7 and plts<20K in setting of sepsis   - [facility] following  - monitor on tele and     Breast cancer ER/RI Neg, HER-2 pos on chemotherapy w/ mets to lung, liver, spleen, spine, bone and brain  - c/w pregabalin, methylphenidate (confirmed on ISTOP Reference #: [id number])  - Reports being on methadone for pain but last 30 day script on istop dispensed 09/19/24  - Is on po dilaudid 4mg prn w/ last script dispensed 12/04/24   - now on PCA pump  - pain management following, revert back to PO Dilaudid regimen on d/c      39y  Female with h/o metastatic breast cancer ER/AL Neg, HER-2 + on chemotherapy (last dose 12/26/24) (mets to lung, liver, spleen, spine, bone and brain), gastritis w/ intermittent episodes of dark stool (follows w/ Dr. Rosado GI and is on PPI and octreotide daily). Patient presented 1/5 with c/o worsening SOB.  She was seen in ED 1/2/25 for for weakness and SOB at which time she was found to have Hb of 5 requiring PRBC transfusion and positive for Flu A and started on tamiflu and was discharged from the ED on 1/3/25.  Her respiratory status has worsened since then w/ productive cough associated with body aches and chills. Admitted for sepsis ,acute hypoxic respiratory failure with flu A. Currently on HFNC for worsening respiratory status.S/P  Tamiflu. Vancomycn and Zosyn was added. Blood cultures with MSSA.  Cardiology consultation requested for evaluation of endocarditis.   Blood cultures 1/5, 1/7, 1/9 reporting MSSA , Repeat blood cultures ordered for q 48hours next one will be 1/11/25, Sputum Cx 1/6 Staphylococcus aureus. MARYANN hold for active gib requring transfusion. Pt had large BM likley diverticular bleed. Had recent scopy. Gi following.  Urine Cx 1/5 reporting 10k - 49k Escherichia coli  of ? significance  since UA not concerning for UTI.  RVP/COVID 19 PCR + Influenza A.      Acute blood loss anemia   GIB  -Patient with GIB requiring transfusion from 1/8/2025 and drop in Hgb to 5.2 am,  with continued blood in stool  -2 u PRBC on going. repeat cbc after finish transfusion  -PPI/OCTRIO  -GI eval with no plan for scopes 2/2 recently done.  Still with bleeding.  reed GI history, ? GAVE disease per GI notes and prior EGD . H as had multiple EGD/colonoscopies in past as well. Per their current recommendations CTA is planned if recurrent bleeding. WILL F/U GI REC    Thrombocytopenia  likely from chemo  -will monitor cbc        Sepsis 2/2 Flu A w/ superimposed multifocal PNA  Acute hypoxic respiratory failure 2/2 Flu A w/ superimposed multifocal PNA   - on NC, comfortable  - persistently positive RVP for flu A  - c/w tamiflu through 1/10  - repeat CT 1/8 shows improved pleural effusion but worsening GGOs, unclear if infectious or malignant etiology  - repeat CT 1/9 with similar findings  - discussed with pulm   - repeat CT next week to evaluate for improvement of infiltrates  - start IV lasix 40mg qd x 2 days given recent blood transfusion  - c/w IV steroids, tapered to q24hr 1/9, continue over the weekend and change to PO if remains stable   - c/w abx per ID recs  - sputum clx growing moderate staph aureus   - TTE EF 65-70%, no WMA    MSSA bacteremia  - blood clx positive 1/5, repeat in process  - ID following  - c/w nafcillin   - MARYANN on hold for gib. 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   Thrombocytopenia likely 2/2 sepsis   Breast cancer ER/AL Neg, HER-2 pos on chemotherapy w/ mets to lung, liver, spleen, spine, bone and brain  - Baseline Hb ranges 8-9    - s/p prbc transfusion on 1/2, 1/8 and 1/9. prbc Today  - no bleeding reported overnight  - trend CBC, transfuse for hgb <7  - c/w home sub q octreotide and IV protonix  - GI consulted, suspect likely diverticular bleed and will likely resolved without intervention, monitor vitals and CBC  - Plts likely low from sepsis and no intervention required at this time  - Monitor CBC and transfuse for Hb<7 and plts<20K in setting of sepsis   - [facility] following  - monitor on tele and     Breast cancer ER/AL Neg, HER-2 pos on chemotherapy w/ mets to lung, liver, spleen, spine, bone and brain  - c/w pregabalin, methylphenidate (confirmed on ISTOP Reference #: [id number])  - Reports being on methadone for pain but last 30 day script on istop dispensed 09/19/24  - Is on po dilaudid 4mg prn w/ last script dispensed 12/04/24   - now on PCA pump  - pain management following, revert back to PO Dilaudid regimen on d/c     dvt ppx scd'd  dw pt  dw rn

## 2025-01-11 NOTE — PROGRESS NOTE ADULT - SUBJECTIVE AND OBJECTIVE BOX
Patient is a 39y old  Female who presents with a chief complaint of sepsis (11 Jan 2025 09:42)      pt is c/o abd cramp.on Dilaudid  pca pump.tolerating po diet  REVIEW OF SYSTEMS: All systems are reviewed and found to be negative except above    MEDICATIONS  (STANDING):  acetylcysteine 10%  Inhalation 4 milliLiter(s) Inhalation every 6 hours  albuterol/ipratropium for Nebulization. 3 milliLiter(s) Nebulizer once  chlorhexidine 2% Cloths 1 Application(s) Topical daily  dextrose 5%. 1000 milliLiter(s) (50 mL/Hr) IV Continuous <Continuous>  dextrose 5%. 1000 milliLiter(s) (100 mL/Hr) IV Continuous <Continuous>  dextrose 50% Injectable 25 Gram(s) IV Push once  dextrose 50% Injectable 25 Gram(s) IV Push once  dextrose 50% Injectable 12.5 Gram(s) IV Push once  furosemide   Injectable 40 milliGRAM(s) IV Push daily  glucagon  Injectable 1 milliGRAM(s) IntraMuscular once  HYDROmorphone PCA (1 mG/mL) 30 milliLiter(s) PCA Continuous PCA Continuous  insulin lispro (ADMELOG) corrective regimen sliding scale   SubCutaneous three times a day before meals  lactulose Syrup 10 Gram(s) Oral daily  levalbuterol Inhalation 1.25 milliGRAM(s) Inhalation every 6 hours  methadone    Tablet 10 milliGRAM(s) Oral at bedtime  methadone    Tablet 5 milliGRAM(s) Oral <User Schedule>  methylPREDNISolone sodium succinate Injectable 40 milliGRAM(s) IV Push every 24 hours  nafcillin  IVPB 2 Gram(s) IV Intermittent every 4 hours  octreotide  Injectable 100 MICROGram(s) SubCutaneous two times a day  pantoprazole  Injectable 40 milliGRAM(s) IV Push every 12 hours  pregabalin 200 milliGRAM(s) Oral every 8 hours    MEDICATIONS  (PRN):  acetaminophen     Tablet .. 650 milliGRAM(s) Oral every 6 hours PRN Temp greater or equal to 38C (100.4F), Mild Pain (1 - 3)  aluminum hydroxide/magnesium hydroxide/simethicone Suspension 30 milliLiter(s) Oral every 4 hours PRN Dyspepsia  benzonatate 100 milliGRAM(s) Oral every 8 hours PRN Cough  dextrose Oral Gel 15 Gram(s) Oral once PRN Blood Glucose LESS THAN 70 milliGRAM(s)/deciliter  guaifenesin/dextromethorphan Oral Liquid 10 milliLiter(s) Oral every 4 hours PRN Cough  melatonin 3 milliGRAM(s) Oral at bedtime PRN Insomnia  naloxone Injectable 0.1 milliGRAM(s) IV Push every 3 minutes PRN For ANY of the following changes in patient status:  A. RR LESS THAN 10 breaths per minute, B. Oxygen saturation LESS THAN 90%, C. Sedation score of 6  ondansetron Injectable 4 milliGRAM(s) IV Push every 8 hours PRN Nausea and/or Vomiting      CAPILLARY BLOOD GLUCOSE      POCT Blood Glucose.: 179 mg/dL (11 Jan 2025 12:34)  POCT Blood Glucose.: 154 mg/dL (11 Jan 2025 08:57)  POCT Blood Glucose.: 193 mg/dL (10 Cristhian 2025 22:02)  POCT Blood Glucose.: 96 mg/dL (10 Cristhian 2025 18:31)    I&O's Summary    10 Cristhian 2025 07:01  -  11 Jan 2025 07:00  --------------------------------------------------------  IN: 200 mL / OUT: 600 mL / NET: -400 mL    11 Jan 2025 07:01  -  11 Jan 2025 13:50  --------------------------------------------------------  IN: 433 mL / OUT: 800 mL / NET: -367 mL        PHYSICAL EXAM:  Vital Signs Last 24 Hrs  T(C): 36.8 (11 Jan 2025 12:20), Max: 37.1 (11 Jan 2025 04:02)  T(F): 98.3 (11 Jan 2025 12:20), Max: 98.7 (11 Jan 2025 04:02)  HR: 96 (11 Jan 2025 12:20) (79 - 119)  BP: 111/65 (11 Jan 2025 12:20) (95/58 - 117/81)  BP(mean): 80 (11 Jan 2025 12:20) (66 - 90)  RR: 17 (11 Jan 2025 12:20) (15 - 19)  SpO2: 98% (11 Jan 2025 12:20) (94% - 100%)    Parameters below as of 11 Jan 2025 12:20  Patient On (Oxygen Delivery Method): nasal cannula  O2 Flow (L/min): 4      CONSTITUTIONAL: NAD,  EYES: PERRLA; conjunctiva and sclera clear  ENMT: Moist oral mucosa,   RESPIRATORY: Normal respiratory effort; lungs are clear to auscultation bilaterally  CARDIOVASCULAR: Regular rate and rhythm, normal S1 and S2, no murmur   EXTS: No lower extremity edema; Peripheral pulses are 2+ bilaterally  ABDOMEN: Nontender to palpation, normoactive bowel sounds, no rebound/guarding;   MUSCLOSKELETAL:  no joint swelling or tenderness to palpation  PSYCH: affect appropriate  NEUROLOGY: A+O to person, place, and time; CN 2-12 are intact and symmetric; no gross sensory deficits;       LABS:                        5.2    6.78  )-----------( 58       ( 11 Jan 2025 05:20 )             16.1     01-11    142  |  101  |  17.3  ----------------------------<  144[H]  3.1[L]   |  28.0  |  0.47[L]    Ca    7.7[L]      11 Jan 2025 05:20  Mg     1.8     01-10    TPro  4.8[L]  /  Alb  2.8[L]  /  TBili  1.1  /  DBili  0.8[H]  /  AST  15  /  ALT  14  /  AlkPhos  172[H]  01-11          Urinalysis Basic - ( 11 Jan 2025 05:20 )    Color: x / Appearance: x / SG: x / pH: x  Gluc: 144 mg/dL / Ketone: x  / Bili: x / Urobili: x   Blood: x / Protein: x / Nitrite: x   Leuk Esterase: x / RBC: x / WBC x   Sq Epi: x / Non Sq Epi: x / Bacteria: x        Culture - Blood (collected 09 Jan 2025 04:57)  Source: .Blood BLOOD  Gram Stain (10 Cristhian 2025 03:37):    Growth in aerobic bottle: Gram Positive Cocci in Clusters  Final Report (11 Jan 2025 13:23):    Growth in aerobic bottle: Staphylococcus aureus  Organism: Staphylococcus aureus (11 Jan 2025 13:23)  Organism: Staphylococcus aureus (11 Jan 2025 13:23)        RADIOLOGY & ADDITIONAL TESTS:  Results Reviewed:

## 2025-01-12 LAB
ANION GAP SERPL CALC-SCNC: 14 MMOL/L — SIGNIFICANT CHANGE UP (ref 5–17)
BUN SERPL-MCNC: 14.7 MG/DL — SIGNIFICANT CHANGE UP (ref 8–20)
CALCIUM SERPL-MCNC: 8.3 MG/DL — LOW (ref 8.4–10.5)
CHLORIDE SERPL-SCNC: 96 MMOL/L — SIGNIFICANT CHANGE UP (ref 96–108)
CO2 SERPL-SCNC: 30 MMOL/L — HIGH (ref 22–29)
CREAT SERPL-MCNC: 0.53 MG/DL — SIGNIFICANT CHANGE UP (ref 0.5–1.3)
EGFR: 121 ML/MIN/1.73M2 — SIGNIFICANT CHANGE UP
GLUCOSE BLDC GLUCOMTR-MCNC: 146 MG/DL — HIGH (ref 70–99)
GLUCOSE BLDC GLUCOMTR-MCNC: 166 MG/DL — HIGH (ref 70–99)
GLUCOSE BLDC GLUCOMTR-MCNC: 203 MG/DL — HIGH (ref 70–99)
GLUCOSE BLDC GLUCOMTR-MCNC: 209 MG/DL — HIGH (ref 70–99)
GLUCOSE SERPL-MCNC: 130 MG/DL — HIGH (ref 70–99)
HCT VFR BLD CALC: 23 % — LOW (ref 34.5–45)
HCT VFR BLD CALC: 25.5 % — LOW (ref 34.5–45)
HGB BLD-MCNC: 7.6 G/DL — LOW (ref 11.5–15.5)
HGB BLD-MCNC: 8.5 G/DL — LOW (ref 11.5–15.5)
MCHC RBC-ENTMCNC: 29 PG — SIGNIFICANT CHANGE UP (ref 27–34)
MCHC RBC-ENTMCNC: 29.2 PG — SIGNIFICANT CHANGE UP (ref 27–34)
MCHC RBC-ENTMCNC: 33 G/DL — SIGNIFICANT CHANGE UP (ref 32–36)
MCHC RBC-ENTMCNC: 33.3 G/DL — SIGNIFICANT CHANGE UP (ref 32–36)
MCV RBC AUTO: 87.6 FL — SIGNIFICANT CHANGE UP (ref 80–100)
MCV RBC AUTO: 87.8 FL — SIGNIFICANT CHANGE UP (ref 80–100)
NRBC # BLD: 3 /100 WBCS — HIGH (ref 0–0)
NRBC # BLD: 3 /100 WBCS — HIGH (ref 0–0)
NRBC BLD-RTO: 3 /100 WBCS — HIGH (ref 0–0)
NRBC BLD-RTO: 3 /100 WBCS — HIGH (ref 0–0)
PLATELET # BLD AUTO: 78 K/UL — LOW (ref 150–400)
PLATELET # BLD AUTO: 92 K/UL — LOW (ref 150–400)
POTASSIUM SERPL-MCNC: 3.3 MMOL/L — LOW (ref 3.5–5.3)
POTASSIUM SERPL-SCNC: 3.3 MMOL/L — LOW (ref 3.5–5.3)
RBC # BLD: 2.62 M/UL — LOW (ref 3.8–5.2)
RBC # BLD: 2.91 M/UL — LOW (ref 3.8–5.2)
RBC # FLD: 20.5 % — HIGH (ref 10.3–14.5)
RBC # FLD: 21.9 % — HIGH (ref 10.3–14.5)
SODIUM SERPL-SCNC: 139 MMOL/L — SIGNIFICANT CHANGE UP (ref 135–145)
WBC # BLD: 11.54 K/UL — HIGH (ref 3.8–10.5)
WBC # BLD: 12.54 K/UL — HIGH (ref 3.8–10.5)
WBC # FLD AUTO: 11.54 K/UL — HIGH (ref 3.8–10.5)
WBC # FLD AUTO: 12.54 K/UL — HIGH (ref 3.8–10.5)

## 2025-01-12 PROCEDURE — 99233 SBSQ HOSP IP/OBS HIGH 50: CPT

## 2025-01-12 PROCEDURE — 99232 SBSQ HOSP IP/OBS MODERATE 35: CPT

## 2025-01-12 RX ORDER — POTASSIUM CHLORIDE 750 MG/1
40 TABLET, EXTENDED RELEASE ORAL ONCE
Refills: 0 | Status: COMPLETED | OUTPATIENT
Start: 2025-01-12 | End: 2025-01-12

## 2025-01-12 RX ORDER — DIPHENHYDRAMINE HCL 25 MG
25 CAPSULE ORAL ONCE
Refills: 0 | Status: COMPLETED | OUTPATIENT
Start: 2025-01-12 | End: 2025-01-12

## 2025-01-12 RX ADMIN — Medication 2: at 11:18

## 2025-01-12 RX ADMIN — NAFCILLIN INJECTION 200 GRAM(S): 2 POWDER, FOR SOLUTION INTRAMUSCULAR; INTRAMUSCULAR; INTRAVENOUS at 21:06

## 2025-01-12 RX ADMIN — Medication 100 MILLIGRAM(S): at 16:48

## 2025-01-12 RX ADMIN — ACETAMINOPHEN, DIPHENHYDRAMINE HCL, PHENYLEPHRINE HCL 3 MILLIGRAM(S): 325; 25; 5 TABLET ORAL at 21:06

## 2025-01-12 RX ADMIN — PREGABALIN CAPSULES, CV 200 MILLIGRAM(S): 225 CAPSULE ORAL at 05:46

## 2025-01-12 RX ADMIN — Medication 4 MILLILITER(S): at 20:51

## 2025-01-12 RX ADMIN — Medication 4 MILLILITER(S): at 16:38

## 2025-01-12 RX ADMIN — OCTREOTIDE ACETATE 100 MICROGRAM(S): 1000 INJECTION INTRAVENOUS; SUBCUTANEOUS at 16:49

## 2025-01-12 RX ADMIN — Medication 2: at 16:50

## 2025-01-12 RX ADMIN — Medication 40 MILLIGRAM(S): at 10:20

## 2025-01-12 RX ADMIN — POTASSIUM CHLORIDE 40 MILLIEQUIVALENT(S): 750 TABLET, EXTENDED RELEASE ORAL at 16:49

## 2025-01-12 RX ADMIN — NAFCILLIN INJECTION 200 GRAM(S): 2 POWDER, FOR SOLUTION INTRAMUSCULAR; INTRAMUSCULAR; INTRAVENOUS at 13:42

## 2025-01-12 RX ADMIN — DEXTROMETHORPHAN HBR AND GUAIFENESIN ORAL SOLUTION 10 MILLILITER(S): 10; 100 LIQUID ORAL at 11:12

## 2025-01-12 RX ADMIN — Medication 4 MILLILITER(S): at 10:53

## 2025-01-12 RX ADMIN — NAFCILLIN INJECTION 200 GRAM(S): 2 POWDER, FOR SOLUTION INTRAMUSCULAR; INTRAMUSCULAR; INTRAVENOUS at 10:20

## 2025-01-12 RX ADMIN — HYDROMORPHONE HYDROCHLORIDE 30 MILLILITER(S): 4 INJECTION, SOLUTION INTRAMUSCULAR; INTRAVENOUS; SUBCUTANEOUS at 10:07

## 2025-01-12 RX ADMIN — Medication 40 MILLIGRAM(S): at 05:46

## 2025-01-12 RX ADMIN — NAFCILLIN INJECTION 200 GRAM(S): 2 POWDER, FOR SOLUTION INTRAMUSCULAR; INTRAMUSCULAR; INTRAVENOUS at 05:54

## 2025-01-12 RX ADMIN — PANTOPRAZOLE 40 MILLIGRAM(S): 20 TABLET, DELAYED RELEASE ORAL at 16:48

## 2025-01-12 RX ADMIN — Medication 25 MILLIGRAM(S): at 21:37

## 2025-01-12 RX ADMIN — DEXTROMETHORPHAN HBR AND GUAIFENESIN ORAL SOLUTION 10 MILLILITER(S): 10; 100 LIQUID ORAL at 16:49

## 2025-01-12 RX ADMIN — Medication 1.25 MILLIGRAM(S): at 16:39

## 2025-01-12 RX ADMIN — Medication 1.25 MILLIGRAM(S): at 10:52

## 2025-01-12 RX ADMIN — PREGABALIN CAPSULES, CV 200 MILLIGRAM(S): 225 CAPSULE ORAL at 21:05

## 2025-01-12 RX ADMIN — HYDROMORPHONE HYDROCHLORIDE 30 MILLILITER(S): 4 INJECTION, SOLUTION INTRAMUSCULAR; INTRAVENOUS; SUBCUTANEOUS at 19:06

## 2025-01-12 RX ADMIN — Medication 1.25 MILLIGRAM(S): at 20:50

## 2025-01-12 RX ADMIN — PREGABALIN CAPSULES, CV 200 MILLIGRAM(S): 225 CAPSULE ORAL at 13:11

## 2025-01-12 RX ADMIN — Medication 1.25 MILLIGRAM(S): at 04:22

## 2025-01-12 RX ADMIN — DEXTROMETHORPHAN HBR AND GUAIFENESIN ORAL SOLUTION 10 MILLILITER(S): 10; 100 LIQUID ORAL at 21:06

## 2025-01-12 RX ADMIN — HYDROMORPHONE HYDROCHLORIDE 30 MILLILITER(S): 4 INJECTION, SOLUTION INTRAMUSCULAR; INTRAVENOUS; SUBCUTANEOUS at 07:33

## 2025-01-12 RX ADMIN — Medication 100 MILLIGRAM(S): at 11:12

## 2025-01-12 RX ADMIN — ONDANSETRON 4 MILLIGRAM(S): 4 TABLET, ORALLY DISINTEGRATING ORAL at 05:44

## 2025-01-12 RX ADMIN — METHADONE HYDROCHLORIDE 5 MILLIGRAM(S): 5 SOLUTION ORAL at 05:47

## 2025-01-12 RX ADMIN — NAFCILLIN INJECTION 200 GRAM(S): 2 POWDER, FOR SOLUTION INTRAMUSCULAR; INTRAMUSCULAR; INTRAVENOUS at 02:47

## 2025-01-12 RX ADMIN — NAFCILLIN INJECTION 200 GRAM(S): 2 POWDER, FOR SOLUTION INTRAMUSCULAR; INTRAMUSCULAR; INTRAVENOUS at 16:49

## 2025-01-12 RX ADMIN — METHADONE HYDROCHLORIDE 5 MILLIGRAM(S): 5 SOLUTION ORAL at 13:11

## 2025-01-12 RX ADMIN — PANTOPRAZOLE 40 MILLIGRAM(S): 20 TABLET, DELAYED RELEASE ORAL at 05:46

## 2025-01-12 RX ADMIN — METHADONE HYDROCHLORIDE 10 MILLIGRAM(S): 5 SOLUTION ORAL at 21:05

## 2025-01-12 RX ADMIN — OCTREOTIDE ACETATE 100 MICROGRAM(S): 1000 INJECTION INTRAVENOUS; SUBCUTANEOUS at 05:47

## 2025-01-12 RX ADMIN — ANTISEPTIC SURGICAL SCRUB 1 APPLICATION(S): 0.04 SOLUTION TOPICAL at 11:11

## 2025-01-12 RX ADMIN — Medication 4 MILLILITER(S): at 04:22

## 2025-01-12 NOTE — PROGRESS NOTE ADULT - SUBJECTIVE AND OBJECTIVE BOX
NATIVIDAD MYERS  [id number]    Chief Complaint:  follow up Bacteremia      Subjective:  port removed, mild pain right side of chest      24 hour Tele: SR         acetaminophen     Tablet .. 650 milliGRAM(s) Oral every 6 hours PRN  acetylcysteine 10%  Inhalation 4 milliLiter(s) Inhalation every 6 hours  aluminum hydroxide/magnesium hydroxide/simethicone Suspension 30 milliLiter(s) Oral every 4 hours PRN  benzonatate 100 milliGRAM(s) Oral every 8 hours PRN  chlorhexidine 2% Cloths 1 Application(s) Topical daily  dextrose 5%. 1000 milliLiter(s) IV Continuous <Continuous>  dextrose 5%. 1000 milliLiter(s) IV Continuous <Continuous>  dextrose 50% Injectable 25 Gram(s) IV Push once  dextrose 50% Injectable 12.5 Gram(s) IV Push once  dextrose 50% Injectable 25 Gram(s) IV Push once  dextrose Oral Gel 15 Gram(s) Oral once PRN  glucagon  Injectable 1 milliGRAM(s) IntraMuscular once  guaifenesin/dextromethorphan Oral Liquid 10 milliLiter(s) Oral every 4 hours PRN  HYDROmorphone PCA (1 mG/mL) 30 milliLiter(s) PCA Continuous PCA Continuous  insulin lispro (ADMELOG) corrective regimen sliding scale   SubCutaneous three times a day before meals  lactulose Syrup 10 Gram(s) Oral daily  levalbuterol Inhalation 1.25 milliGRAM(s) Inhalation every 6 hours  melatonin 3 milliGRAM(s) Oral at bedtime PRN  methadone    Tablet 10 milliGRAM(s) Oral at bedtime  methadone    Tablet 5 milliGRAM(s) Oral <User Schedule>  methylPREDNISolone sodium succinate Injectable 40 milliGRAM(s) IV Push every 24 hours  nafcillin  IVPB 2 Gram(s) IV Intermittent every 4 hours  naloxone Injectable 0.1 milliGRAM(s) IV Push every 3 minutes PRN  octreotide  Injectable 100 MICROGram(s) SubCutaneous two times a day  ondansetron Injectable 4 milliGRAM(s) IV Push every 8 hours PRN  pantoprazole  Injectable 40 milliGRAM(s) IV Push every 12 hours  pregabalin 200 milliGRAM(s) Oral every 8 hours          Physical Exam:  T(C): 36.7 (01-12-25 @ 08:06), Max: 37.4 (01-12-25 @ 00:39)  HR: 109 (01-12-25 @ 10:00) (88 - 113)  BP: 107/73 (01-12-25 @ 10:00) (92/55 - 111/65)  RR: 18 (01-12-25 @ 10:00) (14 - 19)  SpO2: 98% (01-12-25 @ 10:00) (96% - 100%)  Wt(kg): --  General: Comfortable in NAD, pale   HEENT: MMM, sclera anicteric  Resp: CTA bilaterally  CVS: nl s1s2, rrr, no s3/JVD  Abd: soft, NT, ND  Ext: No c/c/e  Neuro A&O x3, tremulous   Psych: Normal affect      I&O's Summary    11 Jan 2025 07:01  -  12 Jan 2025 07:00  --------------------------------------------------------  IN: 864 mL / OUT: 800 mL / NET: 64 mL    12 Jan 2025 07:01  -  12 Jan 2025 10:35  --------------------------------------------------------  IN: 0 mL / OUT: 1100 mL / NET: -1100 mL          Labs:   12 Jan 2025 09:00    139    |  96     |  14.7   ----------------------------<  130    3.3     |  30.0   |  0.53     Ca    8.3        12 Jan 2025 09:00    TPro  4.8    /  Alb  2.8    /  TBili  1.1    /  DBili  0.8    /  AST  15     /  ALT  14     /  AlkPhos  172    11 Jan 2025 05:20                          8.5    12.54 )-----------( 92       ( 12 Jan 2025 09:00 )             25.5       TTE Limited W or WO Ultrasound Enhancing Agent (01.06.25 @ 12:55) >  1. Technically difficult image quality.   2. Left ventricular systolic function is normal with an ejection fraction visually estimated at 65 to 70 %.   3. Normal right ventricular cavity size, with normal wall thickness, and normal right ventricular systolic function.   4. Compared to the transthoracic echocardiogram performed on 7/21/2024, there have been no significant interval changes        < from: TTE W or WO Ultrasound Enhancing Agent (07.21.24 @ 15:40) >  1. Left ventricular cavity is normal in size. Left ventricular wall thickness is normal. Left ventricular systolic function is normal with an ejection fraction visually estimated at 55 to 60 %.   2. Normal left ventricular diastolic function.   3. Normal right ventricular cavity size and normal right ventricular systolic function.   4. The left atrium is normal in size.   5. The right atrium is normal in size.   6. Trileaflet aortic valve with normal systolic excursion.   7. Structurally normal mitral valve with normal leaflet excursion.   8. Trace mitral regurgitation.   9. Mild tricuspid regurgitation.  10. Estimated pulmonary artery systolic pressure is 23 mmHg, normal pulmonary artery pressure.  11. The interatrial septum appears intact.  12. No pericardial effusion seen.      Assessment:  39y  Female with h/o metastatic breast cancer ER/VA Neg, HER-2 + on chemotherapy (last dose 12/26/24) (mets to lung, liver, spleen, spine, bone and brain), gastritis w/ intermittent episodes of dark stool (follows w/ Dr. Rosado GI and is on PPI and octreotide daily). Patient presented 1/5 with c/o worsening SOB.  She was seen in ED 1/2/25 for for weakness and SOB at which time she was found to have Hb of 5 requiring PRBC transfusion and positive for Flu A and started on tamiflu and was discharged from the ED on 1/3/25.  Her respiratory status has worsened since then w/ productive cough associated with body aches and chills.  Denies sick contacts but has had many frequent hospital visits. Patient has been afebrile, no leukocytosis. Currently on HFNC for worsening respiratory status. continued on Tamiflu. Vancomycn and Zosyn was added. Blood cultures with MSSA.  Cardiology consultation requested for evaluation of endocarditis.   Per ID:- Blood cultures 1/5, 1/7, 1/9 reporting MSSA , Repeat blood cultures ordered for q 48hours next one will be 1/11/25, Sputum Cx 1/6 Staphylococcus aureus  - Urine Cx 1/5 reporting 10k - 49k Escherichia coli  of ? significance  since UA not concerning for UTI   - RVP/COVID 19 PCR + Influenza A    -Patient with GIB requiring transfusion from 1/8/2025 and drop in Hgb to 6.7, remains low with continued blood in stool  -MARYANN contraindicated now with active GIB.  -GI eval with no plan for scopes 2/2 recently done.  Still with bleeding.  H/H dropped some post PRBCs. Long GI history, ? GAVE disease per GI notes and prior EGD . H as had multiple EGD/colonoscopies in past as well. Per their current recommendations CTA is planned if recurrent bleeding  -Right sided CP non cardiac, muscular     Plan:   1. No MARYANN at this time until bleeding stops and H/H stable. Repeat blood cultures pending, if negative now that port is removed may not need MARYANN  2. GI and Heme/onc f/u.  3. Abx per ID. Repeat blood cultures planned , Port removed          NATIVIDAD MYERS  [id number]    Chief Complaint:  follow up Bacteremia      Subjective: No sig complainsts      24 hour Tele: Mostly ST        acetaminophen     Tablet .. 650 milliGRAM(s) Oral every 6 hours PRN  acetylcysteine 10%  Inhalation 4 milliLiter(s) Inhalation every 6 hours  aluminum hydroxide/magnesium hydroxide/simethicone Suspension 30 milliLiter(s) Oral every 4 hours PRN  benzonatate 100 milliGRAM(s) Oral every 8 hours PRN  chlorhexidine 2% Cloths 1 Application(s) Topical daily  dextrose 5%. 1000 milliLiter(s) IV Continuous <Continuous>  dextrose 5%. 1000 milliLiter(s) IV Continuous <Continuous>  dextrose 50% Injectable 25 Gram(s) IV Push once  dextrose 50% Injectable 12.5 Gram(s) IV Push once  dextrose 50% Injectable 25 Gram(s) IV Push once  dextrose Oral Gel 15 Gram(s) Oral once PRN  glucagon  Injectable 1 milliGRAM(s) IntraMuscular once  guaifenesin/dextromethorphan Oral Liquid 10 milliLiter(s) Oral every 4 hours PRN  HYDROmorphone PCA (1 mG/mL) 30 milliLiter(s) PCA Continuous PCA Continuous  insulin lispro (ADMELOG) corrective regimen sliding scale   SubCutaneous three times a day before meals  lactulose Syrup 10 Gram(s) Oral daily  levalbuterol Inhalation 1.25 milliGRAM(s) Inhalation every 6 hours  melatonin 3 milliGRAM(s) Oral at bedtime PRN  methadone    Tablet 10 milliGRAM(s) Oral at bedtime  methadone    Tablet 5 milliGRAM(s) Oral <User Schedule>  methylPREDNISolone sodium succinate Injectable 40 milliGRAM(s) IV Push every 24 hours  nafcillin  IVPB 2 Gram(s) IV Intermittent every 4 hours  naloxone Injectable 0.1 milliGRAM(s) IV Push every 3 minutes PRN  octreotide  Injectable 100 MICROGram(s) SubCutaneous two times a day  ondansetron Injectable 4 milliGRAM(s) IV Push every 8 hours PRN  pantoprazole  Injectable 40 milliGRAM(s) IV Push every 12 hours  pregabalin 200 milliGRAM(s) Oral every 8 hours          Physical Exam:  T(C): 36.7 (01-12-25 @ 08:06), Max: 37.4 (01-12-25 @ 00:39)  HR: 109 (01-12-25 @ 10:00) (88 - 113)  BP: 107/73 (01-12-25 @ 10:00) (92/55 - 111/65)  RR: 18 (01-12-25 @ 10:00) (14 - 19)  SpO2: 98% (01-12-25 @ 10:00) (96% - 100%)  Wt(kg): --  General: Comfortable in NAD, pale   HEENT: MMM, sclera anicteric  Resp: right sided rhonchi  CVS: nl s1s2, rrr, 1/6 MISAEL  s3/JVD  Abd: soft, NT, ND  Ext: No c/c/e  Neuro A&O x3, tremulous   Psych: Normal affect      I&O's Summary    11 Jan 2025 07:01  -  12 Jan 2025 07:00  --------------------------------------------------------  IN: 864 mL / OUT: 800 mL / NET: 64 mL    12 Jan 2025 07:01  -  12 Jan 2025 10:35  --------------------------------------------------------  IN: 0 mL / OUT: 1100 mL / NET: -1100 mL          Labs:   12 Jan 2025 09:00    139    |  96     |  14.7   ----------------------------<  130    3.3     |  30.0   |  0.53     Ca    8.3        12 Jan 2025 09:00    TPro  4.8    /  Alb  2.8    /  TBili  1.1    /  DBili  0.8    /  AST  15     /  ALT  14     /  AlkPhos  172    11 Jan 2025 05:20                          8.5    12.54 )-----------( 92       ( 12 Jan 2025 09:00 )             25.5       TTE Limited W or WO Ultrasound Enhancing Agent (01.06.25 @ 12:55) >  1. Technically difficult image quality.   2. Left ventricular systolic function is normal with an ejection fraction visually estimated at 65 to 70 %.   3. Normal right ventricular cavity size, with normal wall thickness, and normal right ventricular systolic function.   4. Compared to the transthoracic echocardiogram performed on 7/21/2024, there have been no significant interval changes        < from: TTE W or WO Ultrasound Enhancing Agent (07.21.24 @ 15:40) >  1. Left ventricular cavity is normal in size. Left ventricular wall thickness is normal. Left ventricular systolic function is normal with an ejection fraction visually estimated at 55 to 60 %.   2. Normal left ventricular diastolic function.   3. Normal right ventricular cavity size and normal right ventricular systolic function.   4. The left atrium is normal in size.   5. The right atrium is normal in size.   6. Trileaflet aortic valve with normal systolic excursion.   7. Structurally normal mitral valve with normal leaflet excursion.   8. Trace mitral regurgitation.   9. Mild tricuspid regurgitation.  10. Estimated pulmonary artery systolic pressure is 23 mmHg, normal pulmonary artery pressure.  11. The interatrial septum appears intact.  12. No pericardial effusion seen.      Assessment:  40 y/o  Female with h/o metastatic breast cancer ER/TN Neg, HER-2 + on chemotherapy (last dose 12/26/24) (mets to lung, liver, spleen, spine, bone and brain), gastritis w/ intermittent episodes of dark stool (follows w/ Dr. Rosado GI and is on PPI and octreotide daily). Patient presented 1/5 with c/o worsening SOB.  In ED 1/2/25 c/o weakness and SOB at which time she was found to have Hb of 5 requiring PRBC transfusion and positive for Flu A and started on tamiflu and was discharged from the ED on 1/3/25.    Her respiratory status worsened w/ productive cough associated with body aches and chills.  Denies sick contacts but has had many frequent hospital visits. Patient has been afebrile, no leukocytosis.  Continued on Tamiflu. Vancomycn and Zosyn was added. Blood cultures with MSSA.  Cardiology consultation requested for evaluation of endocarditis.   Per ID:- Blood cultures 1/5, 1/7, 1/9 reporting MSSA , Repeat blood cultures 1/11/25, No growth.  Sputum Cx 1/6 Staphylococcus aureus  - Urine Cx 1/5 reporting 10k - 49k Escherichia coli  of ? significance  since UA not concerning for UTI   - RVP/COVID 19 PCR + Influenza A    -Patient with GIB requiring transfusion from 1/8/2025 and drop in Hgb to 6.7, remains low with continued blood in stool  -MARYANN contraindicated now with active GIB of uncertain source..  -GI eval with no plan for scopes 2/2 recently done.  Still with bleeding.  H/H dropped some post PRBCs. Long GI history, ? GAVE disease per GI notes and prior EGD . H as had multiple EGD/colonoscopies in past as well. Per their current recommendations CTA is planned if recurrent bleeding    Plan:   1. No MARYANN at this time until bleeding stops and H/H stable. Repeat blood cultures are negative. Now that port is removed may not need MARYANN  2. GI and Heme/onc f/u.  3. Abx per ID. Repeat blood cultures planned , Port removed   4. Will need ID recc re duration of abx therapy.  5. Will be important to get several sets of surveillance BC upon completion of abx course

## 2025-01-12 NOTE — PROGRESS NOTE ADULT - SUBJECTIVE AND OBJECTIVE BOX
HPI:  40 y/o F w/ PMH of metastatic breast cancer ER/ID Neg, HER-2 pos on chemotherapy (mets to lung, liver, spleen, spine, bone and brain), gastritis w/ intermittent episodes of dark stool (follows w/ Dr. Rosado GI and is on PPI and octreotide daily) presented for worsening sob over the past few days.  Pt was seen in ED 1/2/25 for for weakness and sob at which time she was found to have Hb of 5 requiring prbc transfusion and positive for Flu A and started on tamiflu.  Pts respiratory status has worsened since then w/ productive cough but is unable to describe sputum.  Pt reports body aches and chills.  Pt denies sick contacts but has had many frequent hospital visits.  She denies abd pain, N/V, diarrhea, dysuria, cp, palpitations.   (05 Jan 2025 16:40)      PAST MEDICAL & SURGICAL HISTORY:  H/O compression fracture of spine    Anxiety    Metastatic breast cancer    H/O pleural effusion    Pericardial effusion    S/P tonsillectomy    H/O chest tube placement  12/23/21    S/P pericardiocentesis  12/28/21      FAMILY HISTORY:  FH: CVA (cerebrovascular accident)      SOCIAL HISTORY:  [ ] Denies Smoking, Alcohol, or Drug Use    Allergies    pertuzumab (Other (Severe))  Perjeta (Other (Severe))    Intolerances      PAIN MEDICATIONS:  acetaminophen     Tablet .. 650 milliGRAM(s) Oral every 6 hours PRN  HYDROmorphone PCA (1 mG/mL) 30 milliLiter(s) PCA Continuous PCA Continuous  melatonin 3 milliGRAM(s) Oral at bedtime PRN  methadone    Tablet 10 milliGRAM(s) Oral at bedtime  methadone    Tablet 5 milliGRAM(s) Oral <User Schedule>  ondansetron Injectable 4 milliGRAM(s) IV Push every 8 hours PRN  pregabalin 200 milliGRAM(s) Oral every 8 hours    Heme:    Antibiotics:  nafcillin  IVPB 2 Gram(s) IV Intermittent every 4 hours    Cardiovascular:    GI:  aluminum hydroxide/magnesium hydroxide/simethicone Suspension 30 milliLiter(s) Oral every 4 hours PRN  lactulose Syrup 10 Gram(s) Oral daily  pantoprazole  Injectable 40 milliGRAM(s) IV Push every 12 hours    Endocrine:  dextrose 50% Injectable 25 Gram(s) IV Push once  dextrose 50% Injectable 12.5 Gram(s) IV Push once  dextrose 50% Injectable 25 Gram(s) IV Push once  dextrose Oral Gel 15 Gram(s) Oral once PRN  glucagon  Injectable 1 milliGRAM(s) IntraMuscular once  insulin lispro (ADMELOG) corrective regimen sliding scale   SubCutaneous three times a day before meals  methylPREDNISolone sodium succinate Injectable 40 milliGRAM(s) IV Push every 24 hours  octreotide  Injectable 100 MICROGram(s) SubCutaneous two times a day    All Other Medications:  chlorhexidine 2% Cloths 1 Application(s) Topical daily  dextrose 5%. 1000 milliLiter(s) IV Continuous <Continuous>  dextrose 5%. 1000 milliLiter(s) IV Continuous <Continuous>  naloxone Injectable 0.1 milliGRAM(s) IV Push every 3 minutes PRN      Vital Signs Last 24 Hrs  T(C): 36.7 (12 Jan 2025 08:06), Max: 37.4 (12 Jan 2025 00:39)  T(F): 98.1 (12 Jan 2025 08:06), Max: 99.4 (12 Jan 2025 04:18)  HR: 109 (12 Jan 2025 10:00) (88 - 113)  BP: 107/73 (12 Jan 2025 10:00) (92/55 - 111/65)  BP(mean): 83 (12 Jan 2025 10:00) (63 - 83)  RR: 20 (12 Jan 2025 11:00) (14 - 20)  SpO2: 100% (12 Jan 2025 11:00) (96% - 100%)    Parameters below as of 12 Jan 2025 11:00  Patient On (Oxygen Delivery Method): nasal cannula  O2 Flow (L/min): 3  O2 Concentration (%): 32    PAIN SCORE:         SCALE USED: (1-10 VNRS)             PHYSICAL EXAM:    GENERAL: NAD, well-groomed, well-developed  HEAD:  Atraumatic, Normocephalic  EYES: EOMI, PERRLA, conjunctiva and sclera clear  ENMT: No tonsillar erythema, exudates, or enlargement; Moist mucous membranes, Good dentition, No lesions  NECK: Supple, No JVD, Normal thyroid  NERVOUS SYSTEM:  Alert & Oriented X3, Good concentration; Motor Strength 5/5 B/L upper and lower extremities; DTRs 2+ intact and symmetric  CHEST/LUNG: Clear to percussion bilaterally; No rales, rhonchi, wheezing, or rubs  HEART: Regular rate and rhythm; No murmurs, rubs, or gallops  ABDOMEN: Soft, Nontender, Nondistended; Bowel sounds present  EXTREMITIES:  2+ Peripheral Pulses, No clubbing, cyanosis, or edema  LYMPH: No lymphadenopathy noted  SKIN: No rashes or lesions        LABS:                          8.5    12.54 )-----------( 92       ( 12 Jan 2025 09:00 )             25.5     01-12    139  |  96  |  14.7  ----------------------------<  130[H]  3.3[L]   |  30.0[H]  |  0.53    Ca    8.3[L]      12 Jan 2025 09:00    TPro  4.8[L]  /  Alb  2.8[L]  /  TBili  1.1  /  DBili  0.8[H]  /  AST  15  /  ALT  14  /  AlkPhos  172[H]  01-11      Urinalysis Basic - ( 12 Jan 2025 09:00 )    Color: x / Appearance: x / SG: x / pH: x  Gluc: 130 mg/dL / Ketone: x  / Bili: x / Urobili: x   Blood: x / Protein: x / Nitrite: x   Leuk Esterase: x / RBC: x / WBC x   Sq Epi: x / Non Sq Epi: x / Bacteria: x        RADIOLOGY:      [ ]  [place]  Reviewed and Copied to Chart      ASSESSMENT AND RECOMMENDATIONS  Assessment:      Recommendations:         c/c: Patient is a 39y old  Female who presents with a chief complaint of sepsis (12 Jan 2025 11:44)    Interval Hx:  Patient seen during rounds  Patient reports pain to be controlled on current medications  Patient denies sedation with medications      Allergies    pertuzumab (Other (Severe))  Perjeta (Other (Severe))    Intolerances    PAIN MEDICATIONS:  acetaminophen     Tablet .. 650 milliGRAM(s) Oral every 6 hours PRN  HYDROmorphone PCA (1 mG/mL) 30 milliLiter(s) PCA Continuous PCA Continuous  melatonin 3 milliGRAM(s) Oral at bedtime PRN  methadone    Tablet 10 milliGRAM(s) Oral at bedtime  methadone    Tablet 5 milliGRAM(s) Oral <User Schedule>  ondansetron Injectable 4 milliGRAM(s) IV Push every 8 hours PRN  pregabalin 200 milliGRAM(s) Oral every 8 hours    Heme:    Antibiotics:  nafcillin  IVPB 2 Gram(s) IV Intermittent every 4 hours    Cardiovascular:    GI:  aluminum hydroxide/magnesium hydroxide/simethicone Suspension 30 milliLiter(s) Oral every 4 hours PRN  lactulose Syrup 10 Gram(s) Oral daily  pantoprazole  Injectable 40 milliGRAM(s) IV Push every 12 hours    Endocrine:  dextrose 50% Injectable 25 Gram(s) IV Push once  dextrose 50% Injectable 12.5 Gram(s) IV Push once  dextrose 50% Injectable 25 Gram(s) IV Push once  dextrose Oral Gel 15 Gram(s) Oral once PRN  glucagon  Injectable 1 milliGRAM(s) IntraMuscular once  insulin lispro (ADMELOG) corrective regimen sliding scale   SubCutaneous three times a day before meals  methylPREDNISolone sodium succinate Injectable 40 milliGRAM(s) IV Push every 24 hours  octreotide  Injectable 100 MICROGram(s) SubCutaneous two times a day    All Other Medications:  chlorhexidine 2% Cloths 1 Application(s) Topical daily  dextrose 5%. 1000 milliLiter(s) IV Continuous <Continuous>  dextrose 5%. 1000 milliLiter(s) IV Continuous <Continuous>  naloxone Injectable 0.1 milliGRAM(s) IV Push every 3 minutes PRN      Vital Signs Last 24 Hrs  T(C): 36.7 (12 Jan 2025 08:06), Max: 37.4 (12 Jan 2025 00:39)  T(F): 98.1 (12 Jan 2025 08:06), Max: 99.4 (12 Jan 2025 04:18)  HR: 109 (12 Jan 2025 10:00) (88 - 113)  BP: 107/73 (12 Jan 2025 10:00) (92/55 - 111/65)  BP(mean): 83 (12 Jan 2025 10:00) (63 - 83)  RR: 20 (12 Jan 2025 11:00) (14 - 20)  SpO2: 100% (12 Jan 2025 11:00) (96% - 100%)    Parameters below as of 12 Jan 2025 11:00  Patient On (Oxygen Delivery Method): nasal cannula  O2 Flow (L/min): 3  O2 Concentration (%): 32    PAIN SCORE:   3   SCALE USED: (1-10 VNRS)             LABS:                          8.5    12.54 )-----------( 92       ( 12 Jan 2025 09:00 )             25.5     01-12    139  |  96  |  14.7  ----------------------------<  130[H]  3.3[L]   |  30.0[H]  |  0.53    Ca    8.3[L]      12 Jan 2025 09:00    TPro  4.8[L]  /  Alb  2.8[L]  /  TBili  1.1  /  DBili  0.8[H]  /  AST  15  /  ALT  14  /  AlkPhos  172[H]  01-11      Urinalysis Basic - ( 12 Jan 2025 09:00 )    Color: x / Appearance: x / SG: x / pH: x  Gluc: 130 mg/dL / Ketone: x  / Bili: x / Urobili: x   Blood: x / Protein: x / Nitrite: x   Leuk Esterase: x / RBC: x / WBC x   Sq Epi: x / Non Sq Epi: x / Bacteria: x      ASSESSMENT AND RECOMMENDATIONS  Assessment:  40 yo F with h/o metastatic breast cancer ER/TX Neg, HER-2 + on chemotherapy (last dose 12/26/24) (mets to lung, liver, spleen, spine, bone and brain).     Pain management following for pain control.    Recommendations:   - Continue dPCA 0/0.35/8/6   - Continue home Methadone 5mg po bid at 0600 and 1400   - Continue home Methadone 10mg po qHS   - Continue home Lyrica 200mg po q8h    When due for discharge:   - DC dPCA   - Recommend starting Dilaudid PO 4mg/8mg [id number] PRN mod/severe pain        Will continue to follow. Please call pain management with any questions [phone]

## 2025-01-12 NOTE — PROGRESS NOTE ADULT - SUBJECTIVE AND OBJECTIVE BOX
Patient is a 39y old  Female who presents with a chief complaint of sepsis (12 Jan 2025 11:44)    pt had one dark bm early am.hb stable tolerating po diet. eating breakfast jordan  REVIEW OF SYSTEMS: All systems are reviewed and found to be negative except above    MEDICATIONS  (STANDING):  acetylcysteine 10%  Inhalation 4 milliLiter(s) Inhalation every 6 hours  chlorhexidine 2% Cloths 1 Application(s) Topical daily  dextrose 5%. 1000 milliLiter(s) (50 mL/Hr) IV Continuous <Continuous>  dextrose 5%. 1000 milliLiter(s) (100 mL/Hr) IV Continuous <Continuous>  dextrose 50% Injectable 25 Gram(s) IV Push once  dextrose 50% Injectable 12.5 Gram(s) IV Push once  dextrose 50% Injectable 25 Gram(s) IV Push once  glucagon  Injectable 1 milliGRAM(s) IntraMuscular once  HYDROmorphone PCA (1 mG/mL) 30 milliLiter(s) PCA Continuous PCA Continuous  insulin lispro (ADMELOG) corrective regimen sliding scale   SubCutaneous three times a day before meals  lactulose Syrup 10 Gram(s) Oral daily  levalbuterol Inhalation 1.25 milliGRAM(s) Inhalation every 6 hours  methadone    Tablet 10 milliGRAM(s) Oral at bedtime  methadone    Tablet 5 milliGRAM(s) Oral <User Schedule>  methylPREDNISolone sodium succinate Injectable 40 milliGRAM(s) IV Push every 24 hours  nafcillin  IVPB 2 Gram(s) IV Intermittent every 4 hours  octreotide  Injectable 100 MICROGram(s) SubCutaneous two times a day  pantoprazole  Injectable 40 milliGRAM(s) IV Push every 12 hours  pregabalin 200 milliGRAM(s) Oral every 8 hours    MEDICATIONS  (PRN):  acetaminophen     Tablet .. 650 milliGRAM(s) Oral every 6 hours PRN Temp greater or equal to 38C (100.4F), Mild Pain (1 - 3)  aluminum hydroxide/magnesium hydroxide/simethicone Suspension 30 milliLiter(s) Oral every 4 hours PRN Dyspepsia  benzonatate 100 milliGRAM(s) Oral every 8 hours PRN Cough  dextrose Oral Gel 15 Gram(s) Oral once PRN Blood Glucose LESS THAN 70 milliGRAM(s)/deciliter  guaifenesin/dextromethorphan Oral Liquid 10 milliLiter(s) Oral every 4 hours PRN Cough  melatonin 3 milliGRAM(s) Oral at bedtime PRN Insomnia  naloxone Injectable 0.1 milliGRAM(s) IV Push every 3 minutes PRN For ANY of the following changes in patient status:  A. RR LESS THAN 10 breaths per minute, B. Oxygen saturation LESS THAN 90%, C. Sedation score of 6  ondansetron Injectable 4 milliGRAM(s) IV Push every 8 hours PRN Nausea and/or Vomiting      CAPILLARY BLOOD GLUCOSE      POCT Blood Glucose.: 209 mg/dL (12 Jan 2025 11:09)  POCT Blood Glucose.: 146 mg/dL (12 Jan 2025 09:00)  POCT Blood Glucose.: 119 mg/dL (11 Jan 2025 21:55)  POCT Blood Glucose.: 167 mg/dL (11 Jan 2025 18:05)    I&O's Summary    11 Jan 2025 07:01  -  12 Jan 2025 07:00  --------------------------------------------------------  IN: 864 mL / OUT: 800 mL / NET: 64 mL    12 Jan 2025 07:01  -  12 Jan 2025 14:10  --------------------------------------------------------  IN: 0 mL / OUT: 1100 mL / NET: -1100 mL        PHYSICAL EXAM:  Vital Signs Last 24 Hrs  T(C): 36.7 (12 Jan 2025 08:06), Max: 37.4 (12 Jan 2025 00:39)  T(F): 98.1 (12 Jan 2025 08:06), Max: 99.4 (12 Jan 2025 04:18)  HR: 103 (12 Jan 2025 12:00) (88 - 113)  BP: 113/61 (12 Jan 2025 12:00) (92/55 - 113/61)  BP(mean): 77 (12 Jan 2025 12:00) (67 - 83)  RR: 18 (12 Jan 2025 12:00) (14 - 20)  SpO2: 99% (12 Jan 2025 12:00) (96% - 100%)    Parameters below as of 12 Jan 2025 12:00  Patient On (Oxygen Delivery Method): nasal cannula  O2 Flow (L/min): 3      CONSTITUTIONAL: NAD,  EYES: PERRLA; conjunctiva and sclera clear  ENMT: Moist oral mucosa,   RESPIRATORY: Normal respiratory effort; lungs are clear to auscultation bilaterally  CARDIOVASCULAR: Regular rate and rhythm, normal S1 and S2, no murmur   EXTS: No lower extremity edema; Peripheral pulses are 2+ bilaterally  ABDOMEN: Nontender to palpation, normoactive bowel sounds, no rebound/guarding;   MUSCLOSKELETAL: ; no joint swelling or tenderness to palpation  PSYCH: affect appropriate  NEUROLOGY: A+O to person, place, and time; CN 2-12 are intact and symmetric; no gross sensory deficits;       LABS:                        8.5    12.54 )-----------( 92       ( 12 Jan 2025 09:00 )             25.5     01-12    139  |  96  |  14.7  ----------------------------<  130[H]  3.3[L]   |  30.0[H]  |  0.53    Ca    8.3[L]      12 Jan 2025 09:00    TPro  4.8[L]  /  Alb  2.8[L]  /  TBili  1.1  /  DBili  0.8[H]  /  AST  15  /  ALT  14  /  AlkPhos  172[H]  01-11          Urinalysis Basic - ( 12 Jan 2025 09:00 )    Color: x / Appearance: x / SG: x / pH: x  Gluc: 130 mg/dL / Ketone: x  / Bili: x / Urobili: x   Blood: x / Protein: x / Nitrite: x   Leuk Esterase: x / RBC: x / WBC x   Sq Epi: x / Non Sq Epi: x / Bacteria: x        Culture - Blood (collected 11 Jan 2025 05:25)  Source: .Blood BLOOD  Preliminary Report (12 Jan 2025 09:01):    No growth at 24 hours    Culture - Blood (collected 11 Jan 2025 05:20)  Source: .Blood BLOOD  Preliminary Report (12 Jan 2025 09:01):    No growth at 24 hours    Culture - Wound Aerobic/Anaerobic (collected 10 Cristhian 2025 18:20)  Source: .Surgical Swab  Preliminary Report (12 Jan 2025 08:37):    No growth to date.    Culture - Blood (collected 10 Cristhian 2025 12:20)  Source: .Blood BLOOD  Preliminary Report (11 Jan 2025 17:01):    No growth at 24 hours        RADIOLOGY & ADDITIONAL TESTS:  Results Reviewed:

## 2025-01-12 NOTE — PROGRESS NOTE ADULT - ASSESSMENT
39y  Female with h/o metastatic breast cancer ER/MO Neg, HER-2 + on chemotherapy (last dose 12/26/24) (mets to lung, liver, spleen, spine, bone and brain), gastritis w/ intermittent episodes of dark stool (follows w/ Dr. Rosado GI and is on PPI and octreotide daily). Patient presented 1/5 with c/o worsening SOB.  She was seen in ED 1/2/25 for for weakness and SOB at which time she was found to have Hb of 5 requiring PRBC transfusion and positive for Flu A and started on tamiflu and was discharged from the ED on 1/3/25.  Her respiratory status has worsened since then w/ productive cough associated with body aches and chills. Admitted for sepsis ,acute hypoxic respiratory failure with flu A. Currently on HFNC for worsening respiratory status.S/P  Tamiflu. Vancomycn and Zosyn was added. Blood cultures with MSSA.  Cardiology consultation requested for evaluation of endocarditis.   Blood cultures 1/5, 1/7, 1/9 reporting MSSA , Repeat blood cultures ordered for q 48hours next one will be 1/11/25, Sputum Cx 1/6 Staphylococcus aureus. MARYANN hold for active gib requring transfusion. Pt had large BM likley diverticular bleed. Had recent scopy. Gi following.  Urine Cx 1/5 reporting 10k - 49k Escherichia coli  of ? significance  since UA not concerning for UTI.  RVP/COVID 19 PCR + Influenza A.      Acute blood loss anemia   GIB  -s/p multiple transfusion   -2 u PRBC yesterday. repeat hb improved  -PPI/OCTRIO  -GI eval with no plan for scopes 2/2 recently done.  Still with bleeding.  reed GI history, ? GAVE disease per GI notes and prior EGD . H as had multiple EGD/colonoscopies in past as well. Per their current recommendations CTA is planned if recurrent bleeding. WILL F/U GI REC    Thrombocytopenia  likely from chemo/sepsis  -will monitor cbc        Sepsis 2/2 Flu A w/ superimposed multifocal PNA  Acute hypoxic respiratory failure 2/2 Flu A w/ superimposed multifocal PNA   - on NC, comfortable  - persistently positive RVP for flu A  - s/p  tamiflu through 1/10  - repeat CT 1/8 shows improved pleural effusion but worsening GGOs, unclear if infectious or malignant etiology  - repeat CT 1/9 with similar findings  - repeat CT next week to evaluate for improvement of infiltrates  - s/p  IV lasix 40mg qd x 2 days given recent blood transfusion  - c/w IV steroids, tapered to q24hr 1/9, continue over the weekend and change to PO if remains stable   - c/w abx per ID recs  - sputum clx growing moderate staph aureus   - TTE EF 65-70%, no WMA    MSSA bacteremia  - blood clx positive 1/5, repeat in process  - ID following  - c/w nafcillin   - MARYANN on hold for gib. 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   Thrombocytopenia likely 2/2 sepsis   Breast cancer ER/MO Neg, HER-2 pos on chemotherapy w/ mets to lung, liver, spleen, spine, bone and brain  - Baseline Hb ranges 8-9    - s/p prbc transfusion on 1/2, 1/8 and 1/9,1/11  - no bleeding reported overnight  - trend CBC, transfuse for hgb <7  - c/w home sub q octreotide and IV protonix  - GI consulted, suspect likely diverticular bleed and will likely resolved without intervention, monitor vitals and CBC  - Plts likely low from sepsis and no intervention required at this time  - Monitor CBC and transfuse for Hb<7 and plts<20K in setting of sepsis   - [facility] following  - monitor on tele and     Breast cancer ER/MO Neg, HER-2 pos on chemotherapy w/ mets to lung, liver, spleen, spine, bone and brain  - c/w pregabalin, methylphenidate (confirmed on ISTOP Reference #: [id number])  - Reports being on methadone for pain but last 30 day script on istop dispensed 09/19/24  - Is on po dilaudid 4mg prn w/ last script dispensed 12/04/24   - now on PCA pump  - pain management following, revert back to PO Dilaudid regimen on d/c     dvt ppx scd'd   39y  Female with h/o metastatic breast cancer ER/NH Neg, HER-2 + on chemotherapy (last dose 12/26/24) (mets to lung, liver, spleen, spine, bone and brain), gastritis w/ intermittent episodes of dark stool (follows w/ Dr. Rosado GI and is on PPI and octreotide daily). Patient presented 1/5 with c/o worsening SOB.  She was seen in ED 1/2/25 for for weakness and SOB at which time she was found to have Hb of 5 requiring PRBC transfusion and positive for Flu A and started on tamiflu and was discharged from the ED on 1/3/25.  Her respiratory status has worsened since then w/ productive cough associated with body aches and chills. Admitted for sepsis ,acute hypoxic respiratory failure with flu A. Currently on HFNC for worsening respiratory status.S/P  Tamiflu. Vancomycn and Zosyn was added. Blood cultures with MSSA.  Cardiology consultation requested for evaluation of endocarditis.   Blood cultures 1/5, 1/7, 1/9 reporting MSSA , Repeat blood cultures ordered for q 48hours next one will be 1/11/25, Sputum Cx 1/6 Staphylococcus aureus. MARYANN hold for active gib requring transfusion. Pt had large BM likley diverticular bleed. Had recent scopy. Gi following.  Urine Cx 1/5 reporting 10k - 49k Escherichia coli  of ? significance  since UA not concerning for UTI.  RVP/COVID 19 PCR + Influenza A.      Acute blood loss anemia   GIB  -hb 8.5  -s/p multiple transfusion   -2 u PRBC yesterday. repeat hb improved  -PPI/OCTRIO  -GI eval with no plan for scopes 2/2 recently done.  Still with bleeding.  reed GI history, ? GAVE disease per GI notes and prior EGD . H as had multiple EGD/colonoscopies in past as well. Per their current recommendations CTA is planned if recurrent bleeding. WILL F/U GI REC    Thrombocytopenia  likely from chemo/sepsis  -will monitor cbc        Sepsis 2/2 Flu A w/ superimposed multifocal PNA  Acute hypoxic respiratory failure 2/2 Flu A w/ superimposed multifocal PNA   - on NC, comfortable  - persistently positive RVP for flu A  - s/p  tamiflu through 1/10  - repeat CT 1/8 shows improved pleural effusion but worsening GGOs, unclear if infectious or malignant etiology  - repeat CT 1/9 with similar findings  - repeat CT next week to evaluate for improvement of infiltrates  - s/p  IV lasix 40mg qd x 2 days given recent blood transfusion  - c/w IV steroids, tapered to q24hr 1/9, continue over the weekend and change to PO if remains stable   - c/w abx per ID recs  - sputum clx growing moderate staph aureus   - TTE EF 65-70%, no WMA    MSSA bacteremia  - blood clx positive 1/5, repeat in process  - ID following  - c/w nafcillin   - MARYANN on hold for gib. 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   Thrombocytopenia likely 2/2 sepsis   Breast cancer ER/NH Neg, HER-2 pos on chemotherapy w/ mets to lung, liver, spleen, spine, bone and brain  - Baseline Hb ranges 8-9    - s/p prbc transfusion on 1/2, 1/8 and 1/9,1/11  - no bleeding reported overnight  - trend CBC, transfuse for hgb <7  - c/w home sub q octreotide and IV protonix  - GI consulted, suspect likely diverticular bleed and will likely resolved without intervention, monitor vitals and CBC  - Plts likely low from sepsis and no intervention required at this time  - Monitor CBC and transfuse for Hb<7 and plts<20K in setting of sepsis   - [facility] following  - monitor on tele and     Breast cancer ER/NH Neg, HER-2 pos on chemotherapy w/ mets to lung, liver, spleen, spine, bone and brain  - c/w pregabalin, methylphenidate (confirmed on ISTOP Reference #: [id number])  - Reports being on methadone for pain but last 30 day script on istop dispensed 09/19/24  - Is on po dilaudid 4mg prn w/ last script dispensed 12/04/24   - now on PCA pump  - pain management following, revert back to PO Dilaudid regimen on d/c     dvt ppx scd'd  disposition: pending improvement of bloody bm, pain control.  PT chloe carter pt  raul rn

## 2025-01-13 LAB
ANION GAP SERPL CALC-SCNC: 11 MMOL/L — SIGNIFICANT CHANGE UP (ref 5–17)
ANISOCYTOSIS BLD QL: SLIGHT — SIGNIFICANT CHANGE UP
BLD GP AB SCN SERPL QL: SIGNIFICANT CHANGE UP
BUN SERPL-MCNC: 20.3 MG/DL — HIGH (ref 8–20)
BURR CELLS BLD QL SMEAR: PRESENT — SIGNIFICANT CHANGE UP
CALCIUM SERPL-MCNC: 8.2 MG/DL — LOW (ref 8.4–10.5)
CHLORIDE SERPL-SCNC: 103 MMOL/L — SIGNIFICANT CHANGE UP (ref 96–108)
CO2 SERPL-SCNC: 27 MMOL/L — SIGNIFICANT CHANGE UP (ref 22–29)
CREAT SERPL-MCNC: 0.47 MG/DL — LOW (ref 0.5–1.3)
EGFR: 125 ML/MIN/1.73M2 — SIGNIFICANT CHANGE UP
ELLIPTOCYTES BLD QL SMEAR: SLIGHT — SIGNIFICANT CHANGE UP
GALACTOMANNAN AG SERPL-ACNC: 0.26 INDEX — SIGNIFICANT CHANGE UP (ref 0–0.49)
GLUCOSE BLDC GLUCOMTR-MCNC: 136 MG/DL — HIGH (ref 70–99)
GLUCOSE BLDC GLUCOMTR-MCNC: 139 MG/DL — HIGH (ref 70–99)
GLUCOSE BLDC GLUCOMTR-MCNC: 174 MG/DL — HIGH (ref 70–99)
GLUCOSE BLDC GLUCOMTR-MCNC: 194 MG/DL — HIGH (ref 70–99)
GLUCOSE SERPL-MCNC: 137 MG/DL — HIGH (ref 70–99)
HCT VFR BLD CALC: 20.8 % — CRITICAL LOW (ref 34.5–45)
HCT VFR BLD CALC: 25.2 % — LOW (ref 34.5–45)
HGB BLD-MCNC: 6.7 G/DL — CRITICAL LOW (ref 11.5–15.5)
HGB BLD-MCNC: 8.3 G/DL — LOW (ref 11.5–15.5)
HYPOCHROMIA BLD QL: SIGNIFICANT CHANGE UP
MACROCYTES BLD QL: SLIGHT — SIGNIFICANT CHANGE UP
MANUAL SMEAR VERIFICATION: SIGNIFICANT CHANGE UP
MCHC RBC-ENTMCNC: 28.8 PG — SIGNIFICANT CHANGE UP (ref 27–34)
MCHC RBC-ENTMCNC: 29.3 PG — SIGNIFICANT CHANGE UP (ref 27–34)
MCHC RBC-ENTMCNC: 32.2 G/DL — SIGNIFICANT CHANGE UP (ref 32–36)
MCHC RBC-ENTMCNC: 32.9 G/DL — SIGNIFICANT CHANGE UP (ref 32–36)
MCV RBC AUTO: 87.5 FL — SIGNIFICANT CHANGE UP (ref 80–100)
MCV RBC AUTO: 90.8 FL — SIGNIFICANT CHANGE UP (ref 80–100)
MICROCYTES BLD QL: SLIGHT — SIGNIFICANT CHANGE UP
MYELOCYTES NFR BLD: 5.2 % — HIGH (ref 0–0)
NEUTS BAND # BLD: 0.9 % — SIGNIFICANT CHANGE UP (ref 0–8)
NEUTS BAND NFR BLD: 0.9 % — SIGNIFICANT CHANGE UP (ref 0–8)
NRBC # BLD: 4 /100 WBCS — HIGH (ref 0–0)
NRBC # BLD: 5 /100 WBCS — HIGH (ref 0–0)
NRBC BLD-RTO: 4 /100 WBCS — HIGH (ref 0–0)
NRBC BLD-RTO: 5 /100 WBCS — HIGH (ref 0–0)
OVALOCYTES BLD QL SMEAR: SLIGHT — SIGNIFICANT CHANGE UP
PLAT MORPH BLD: NORMAL — SIGNIFICANT CHANGE UP
PLATELET # BLD AUTO: 114 K/UL — LOW (ref 150–400)
PLATELET # BLD AUTO: 94 K/UL — LOW (ref 150–400)
POIKILOCYTOSIS BLD QL AUTO: SLIGHT — SIGNIFICANT CHANGE UP
POLYCHROMASIA BLD QL SMEAR: SIGNIFICANT CHANGE UP
POTASSIUM SERPL-MCNC: 3.8 MMOL/L — SIGNIFICANT CHANGE UP (ref 3.5–5.3)
POTASSIUM SERPL-SCNC: 3.8 MMOL/L — SIGNIFICANT CHANGE UP (ref 3.5–5.3)
PROMYELOCYTES # FLD: 1.7 % — HIGH (ref 0–0)
PROMYELOCYTES NFR BLD: 1.7 % — HIGH (ref 0–0)
RBC # BLD: 2.29 M/UL — LOW (ref 3.8–5.2)
RBC # BLD: 2.88 M/UL — LOW (ref 3.8–5.2)
RBC # FLD: 22 % — HIGH (ref 10.3–14.5)
RBC # FLD: 22.9 % — HIGH (ref 10.3–14.5)
RBC BLD AUTO: ABNORMAL
SCHISTOCYTES BLD QL AUTO: SLIGHT — SIGNIFICANT CHANGE UP
SODIUM SERPL-SCNC: 141 MMOL/L — SIGNIFICANT CHANGE UP (ref 135–145)
WBC # BLD: 10.58 K/UL — HIGH (ref 3.8–10.5)
WBC # BLD: 16.34 K/UL — HIGH (ref 3.8–10.5)
WBC # FLD AUTO: 10.58 K/UL — HIGH (ref 3.8–10.5)
WBC # FLD AUTO: 16.34 K/UL — HIGH (ref 3.8–10.5)

## 2025-01-13 PROCEDURE — G0545: CPT

## 2025-01-13 PROCEDURE — 74178 CT ABD&PLV WO CNTR FLWD CNTR: CPT | Mod: 26

## 2025-01-13 PROCEDURE — 99233 SBSQ HOSP IP/OBS HIGH 50: CPT

## 2025-01-13 PROCEDURE — 99232 SBSQ HOSP IP/OBS MODERATE 35: CPT

## 2025-01-13 RX ORDER — FENTANYL CITRATE 50 UG/ML
30 INJECTION INTRAMUSCULAR; INTRAVENOUS
Refills: 0 | Status: DISCONTINUED | OUTPATIENT
Start: 2025-01-13 | End: 2025-01-14

## 2025-01-13 RX ADMIN — ONDANSETRON 4 MILLIGRAM(S): 4 TABLET, ORALLY DISINTEGRATING ORAL at 16:51

## 2025-01-13 RX ADMIN — Medication 100 MILLIGRAM(S): at 05:33

## 2025-01-13 RX ADMIN — ANTISEPTIC SURGICAL SCRUB 1 APPLICATION(S): 0.04 SOLUTION TOPICAL at 14:49

## 2025-01-13 RX ADMIN — PANTOPRAZOLE 40 MILLIGRAM(S): 20 TABLET, DELAYED RELEASE ORAL at 18:21

## 2025-01-13 RX ADMIN — METHADONE HYDROCHLORIDE 10 MILLIGRAM(S): 5 SOLUTION ORAL at 22:00

## 2025-01-13 RX ADMIN — Medication 1.25 MILLIGRAM(S): at 08:54

## 2025-01-13 RX ADMIN — FENTANYL CITRATE 30 MILLILITER(S): 50 INJECTION INTRAMUSCULAR; INTRAVENOUS at 19:39

## 2025-01-13 RX ADMIN — Medication 4 MILLILITER(S): at 04:21

## 2025-01-13 RX ADMIN — Medication 20 MILLIGRAM(S): at 15:20

## 2025-01-13 RX ADMIN — PANTOPRAZOLE 40 MILLIGRAM(S): 20 TABLET, DELAYED RELEASE ORAL at 05:34

## 2025-01-13 RX ADMIN — Medication 4 MILLILITER(S): at 20:47

## 2025-01-13 RX ADMIN — NAFCILLIN INJECTION 200 GRAM(S): 2 POWDER, FOR SOLUTION INTRAMUSCULAR; INTRAMUSCULAR; INTRAVENOUS at 22:00

## 2025-01-13 RX ADMIN — FENTANYL CITRATE 30 MILLILITER(S): 50 INJECTION INTRAMUSCULAR; INTRAVENOUS at 11:33

## 2025-01-13 RX ADMIN — Medication 1.25 MILLIGRAM(S): at 20:46

## 2025-01-13 RX ADMIN — Medication 1.25 MILLIGRAM(S): at 04:21

## 2025-01-13 RX ADMIN — DEXTROMETHORPHAN HBR AND GUAIFENESIN ORAL SOLUTION 10 MILLILITER(S): 10; 100 LIQUID ORAL at 14:41

## 2025-01-13 RX ADMIN — Medication 40 MILLIGRAM(S): at 13:26

## 2025-01-13 RX ADMIN — Medication 10 GRAM(S): at 13:27

## 2025-01-13 RX ADMIN — NAFCILLIN INJECTION 200 GRAM(S): 2 POWDER, FOR SOLUTION INTRAMUSCULAR; INTRAMUSCULAR; INTRAVENOUS at 05:35

## 2025-01-13 RX ADMIN — NAFCILLIN INJECTION 200 GRAM(S): 2 POWDER, FOR SOLUTION INTRAMUSCULAR; INTRAMUSCULAR; INTRAVENOUS at 18:21

## 2025-01-13 RX ADMIN — OCTREOTIDE ACETATE 100 MICROGRAM(S): 1000 INJECTION INTRAVENOUS; SUBCUTANEOUS at 19:37

## 2025-01-13 RX ADMIN — ACETAMINOPHEN, DIPHENHYDRAMINE HCL, PHENYLEPHRINE HCL 3 MILLIGRAM(S): 325; 25; 5 TABLET ORAL at 23:11

## 2025-01-13 RX ADMIN — OCTREOTIDE ACETATE 100 MICROGRAM(S): 1000 INJECTION INTRAVENOUS; SUBCUTANEOUS at 05:35

## 2025-01-13 RX ADMIN — METHADONE HYDROCHLORIDE 5 MILLIGRAM(S): 5 SOLUTION ORAL at 05:34

## 2025-01-13 RX ADMIN — Medication 1.25 MILLIGRAM(S): at 14:41

## 2025-01-13 RX ADMIN — DEXTROMETHORPHAN HBR AND GUAIFENESIN ORAL SOLUTION 10 MILLILITER(S): 10; 100 LIQUID ORAL at 09:40

## 2025-01-13 RX ADMIN — Medication 1: at 18:23

## 2025-01-13 RX ADMIN — HYDROMORPHONE HYDROCHLORIDE 1 MILLIGRAM(S): 4 INJECTION, SOLUTION INTRAMUSCULAR; INTRAVENOUS; SUBCUTANEOUS at 04:36

## 2025-01-13 RX ADMIN — NAFCILLIN INJECTION 200 GRAM(S): 2 POWDER, FOR SOLUTION INTRAMUSCULAR; INTRAMUSCULAR; INTRAVENOUS at 13:25

## 2025-01-13 RX ADMIN — Medication 4 MILLILITER(S): at 14:41

## 2025-01-13 RX ADMIN — HYDROMORPHONE HYDROCHLORIDE 30 MILLILITER(S): 4 INJECTION, SOLUTION INTRAMUSCULAR; INTRAVENOUS; SUBCUTANEOUS at 07:25

## 2025-01-13 RX ADMIN — NAFCILLIN INJECTION 200 GRAM(S): 2 POWDER, FOR SOLUTION INTRAMUSCULAR; INTRAMUSCULAR; INTRAVENOUS at 02:54

## 2025-01-13 RX ADMIN — Medication 4 MILLILITER(S): at 08:54

## 2025-01-13 RX ADMIN — METHADONE HYDROCHLORIDE 5 MILLIGRAM(S): 5 SOLUTION ORAL at 14:41

## 2025-01-13 NOTE — PROGRESS NOTE ADULT - SUBJECTIVE AND OBJECTIVE BOX
Interval Hx:  Patient seen during rounds  Patient reports pain to be controlled on current medications  Patient denies sedation with medications        Analgesic Dosing for past 24 hours reviewed as below:    diphenhydrAMINE   25 milliGRAM(s) Oral (01-12-25 @ 21:37)    melatonin   3 milliGRAM(s) Oral (01-12-25 @ 21:06)    methadone    Tablet   10 milliGRAM(s) Oral (01-12-25 @ 21:05)    methadone    Tablet   5 milliGRAM(s) Oral (01-13-25 @ 05:34)   5 milliGRAM(s) Oral (01-12-25 @ 13:11)    pregabalin   200 milliGRAM(s) Oral (01-12-25 @ 21:05)   200 milliGRAM(s) Oral (01-12-25 @ 13:11)          T(C): 36.8 (01-13-25 @ 04:21), Max: 37.2 (01-13-25 @ 00:37)  HR: 101 (01-13-25 @ 06:00) (89 - 109)  BP: 94/57 (01-13-25 @ 06:00) (92/54 - 117/75)  RR: 18 (01-13-25 @ 06:00) (14 - 20)  SpO2: 100% (01-13-25 @ 06:00) (97% - 100%)      01-12-25 @ 07:01  -  01-13-25 @ 07:00  --------------------------------------------------------  IN: 1200 mL / OUT: 1100 mL / NET: 100 mL        acetaminophen     Tablet .. 650 milliGRAM(s) Oral every 6 hours PRN  acetylcysteine 10%  Inhalation 4 milliLiter(s) Inhalation every 6 hours  aluminum hydroxide/magnesium hydroxide/simethicone Suspension 30 milliLiter(s) Oral every 4 hours PRN  benzonatate 100 milliGRAM(s) Oral every 8 hours PRN  chlorhexidine 2% Cloths 1 Application(s) Topical daily  dextrose 5%. 1000 milliLiter(s) IV Continuous <Continuous>  dextrose 5%. 1000 milliLiter(s) IV Continuous <Continuous>  dextrose 50% Injectable 25 Gram(s) IV Push once  dextrose 50% Injectable 12.5 Gram(s) IV Push once  dextrose 50% Injectable 25 Gram(s) IV Push once  dextrose Oral Gel 15 Gram(s) Oral once PRN  glucagon  Injectable 1 milliGRAM(s) IntraMuscular once  guaifenesin/dextromethorphan Oral Liquid 10 milliLiter(s) Oral every 4 hours PRN  HYDROmorphone PCA (1 mG/mL) 30 milliLiter(s) PCA Continuous PCA Continuous  insulin lispro (ADMELOG) corrective regimen sliding scale   SubCutaneous three times a day before meals  lactulose Syrup 10 Gram(s) Oral daily  levalbuterol Inhalation 1.25 milliGRAM(s) Inhalation every 6 hours  melatonin 3 milliGRAM(s) Oral at bedtime PRN  methadone    Tablet 10 milliGRAM(s) Oral at bedtime  methadone    Tablet 5 milliGRAM(s) Oral <User Schedule>  methylPREDNISolone sodium succinate Injectable 40 milliGRAM(s) IV Push every 24 hours  nafcillin  IVPB 2 Gram(s) IV Intermittent every 4 hours  naloxone Injectable 0.1 milliGRAM(s) IV Push every 3 minutes PRN  octreotide  Injectable 100 MICROGram(s) SubCutaneous two times a day  ondansetron Injectable 4 milliGRAM(s) IV Push every 8 hours PRN  pantoprazole  Injectable 40 milliGRAM(s) IV Push every 12 hours                          6.7    10.58 )-----------( 94       ( 13 Jan 2025 06:10 )             20.8     01-12    139  |  96  |  14.7  ----------------------------<  130[H]  3.3[L]   |  30.0[H]  |  0.53    Ca    8.3[L]      12 Jan 2025 09:00        Urinalysis Basic - ( 12 Jan 2025 09:00 )    Color: x / Appearance: x / SG: x / pH: x  Gluc: 130 mg/dL / Ketone: x  / Bili: x / Urobili: x   Blood: x / Protein: x / Nitrite: x   Leuk Esterase: x / RBC: x / WBC x   Sq Epi: x / Non Sq Epi: x / Bacteria: x        Pain Service   [phone]

## 2025-01-13 NOTE — CHART NOTE - NSCHARTNOTEFT_GEN_A_CORE
Nutrition Note:     Source: Patient [x ]  Family [ ]   other [ x] chart, staff/IDT rounds     Current Diet:   Diet, Regular (01-09-25 @ 11:18)    Patient reports [ ] nausea  [ ] vomiting [ ] diarrhea [ ] constipation  [ ]chewing problems [ ] swallowing issues  [ ] other: denies     PO intake:  < 50% [ ]   50-75%  [ ]   %  [x ]  other :    Source for PO intake [x ] Patient [ ] family [ ] chart [ ] staff [ ] other    Current Weight:   (1/13) 63.3kg RD bed scale   (1/7) 67.7 kg  3+ moderate to L and R leg     % Weight Change: Unclear accuracy of weight 2/2 inconsistency     Pertinent Medications: MEDICATIONS  (STANDING):  dextrose 5%. 1000 milliLiter(s) (50 mL/Hr) IV Continuous <Continuous>  furosemide   Injectable 20 milliGRAM(s) IV Push once  insulin lispro (ADMELOG) corrective regimen sliding scale   SubCutaneous three times a day before meals  methylPREDNISolone sodium succinate Injectable 40 milliGRAM(s) IV Push every 24 hours    Pertinent Labs: 01-13 Na141 mmol/L Glu 137 mg/dL[H] K+ 3.8 mmol/L Cr  0.47 mg/dL[L] BUN 20.3 mg/dL[H]     Skin:     Nutrition focused physical exam conducted - found signs of malnutrition [ ]absent [ ]present    Subcutaneous fat loss: [ ] Orbital fat pads region, [ ]Buccal fat region, [ ]Triceps region,  [ ]Ribs region    Muscle wasting: [ ]Temples region, [ ]Clavicle region, [ ]Shoulder region, [ ]Scapula region, [ ]Interosseous region,  [ ]thigh region, [ ]Calf region    Estimated Needs:   [x ] no change since previous assessment  [ ] recalculated:     Current Nutrition Diagnosis: Increased Nutrient Needs... (protein) related to increased physiological demand for healing as evidenced by hx met breast cancer (mets to lung, liver, spleen, spine, bone, brain), sepsis    39y  Female with h/o metastatic breast cancer ER/AK Neg, HER-2 + on chemotherapy (last dose 12/26/24) (mets to lung, liver, spleen, spine, bone and brain), gastritis w/ intermittent episodes of dark stool (follows w/ Dr. Rosado GI and is on PPI and octreotide daily). presented 1/5 with c/o worsening SOB, seen in ED for weakness and SOB found to have Hb of 5 requiring PRBC transfusion and positive for Flu A. Admitted for sepsis ,acute hypoxic respiratory failure with flu A. Currently on HFNC for worsening respiratory status. Blood cultures with MSSA. Blood cultures reporting MSSA. As per MD note, Pt had large BM today 1/13 likely diverticular bleed. GI following. RVP/COVID 19 PCR + Influenza A. repeat CT 1/8 shows improved pleural effusion but worsening GGOs, unclear if infectious or malignant etiology, repeat CT 1/9 with similar findings.     Spoke with pt this AM. Reported good po intake in house. Last BM documented 1/13. GI evaluated for large bloody bowel movement, suspected rectal bleeding due to diverticular bleed. Will continue to monitor. Will add ensure BID for additional protein/calories. RD to remain available.     Recommendations:   - Monitor weights daily for trend/accuracy  - Continue diet as tolerated.   - Rx MVI daily, vit C 500mg    - bowel regimen as per team     Monitoring and Evaluation:   [ x] PO intake [ x] Tolerance to diet prescription [X] Weights  [X] Follow up per protocol [X] Labs: chem 8, mg, phos, H/H

## 2025-01-13 NOTE — PROGRESS NOTE ADULT - SUBJECTIVE AND OBJECTIVE BOX
[facility]  INFECTIOUS DISEASES at [place] / [place] / [place]  =======================================================                              Salazar Toro MD                              Professor Emeritus:  Dr Warner Mcintosh MD            Diplomates American Board of Internal Medicine & Infectious Diseases                                   Tel  [phone] Fax [phone]                                  Hospital Consult line:  [phone]  =======================================================      [place]ERS [id number]    Follow up: MSSA bacteremia    No fevers       Allergies:  pertuzumab (Other (Severe))  Perjeta (Other (Severe))        REVIEW OF SYSTEMS:  CONSTITUTIONAL:  No Fever or chills  HEENT:   No diplopia or blurred vision.  No earache, sore throat or runny nose.  CARDIOVASCULAR:  No Chest Pain  RESPIRATORY:  No cough, shortness of breath  GASTROINTESTINAL:  No nausea, vomiting or diarrhea.  GENITOURINARY:  No dysuria, frequency or urgency. No Blood in urine  MUSCULOSKELETAL:  no joint aches, no muscle pain  SKIN:  No change in skin, hair or nails.  NEUROLOGIC:  No Headaches      Physical Exam:  GEN: NAD  HEENT: normocephalic and atraumatic.    NECK: Supple.   LUNGS: CTA B/L.  HEART: RRR  ABDOMEN: Soft, NT, ND.  +BS.    : No CVA tenderness  EXTREMITIES: Without  edema.  MSK: No joint swelling  NEUROLOGIC: No Focal Deficits   SKIN: No rash  + PORT      Vitals:  T(F): 98.2 (13 Jan 2025 11:09), Max: 99 (13 Jan 2025 00:37)  HR: 102 (13 Jan 2025 11:09)  BP: 105/77 (13 Jan 2025 11:09)  RR: 19 (13 Jan 2025 11:09)  SpO2: 97% (13 Jan 2025 11:09) (95% - 100%)  temp max in last 48H T(F): , Max: 99.4 (01-12-25 @ 04:18)    Current Antibiotics:  nafcillin  IVPB 2 Gram(s) IV Intermittent every 4 hours    Other medications:  acetylcysteine 10%  Inhalation 4 milliLiter(s) Inhalation every 6 hours  chlorhexidine 2% Cloths 1 Application(s) Topical daily  dextrose 5%. 1000 milliLiter(s) IV Continuous <Continuous>  dextrose 5%. 1000 milliLiter(s) IV Continuous <Continuous>  dextrose 50% Injectable 25 Gram(s) IV Push once  dextrose 50% Injectable 12.5 Gram(s) IV Push once  dextrose 50% Injectable 25 Gram(s) IV Push once  fentaNYL PCA (50 MICROgram(s)/mL) 30 milliLiter(s) PCA Continuous PCA Continuous  glucagon  Injectable 1 milliGRAM(s) IntraMuscular once  insulin lispro (ADMELOG) corrective regimen sliding scale   SubCutaneous three times a day before meals  lactulose Syrup 10 Gram(s) Oral daily  levalbuterol Inhalation 1.25 milliGRAM(s) Inhalation every 6 hours  methadone    Tablet 10 milliGRAM(s) Oral at bedtime  methadone    Tablet 5 milliGRAM(s) Oral <User Schedule>  methylPREDNISolone sodium succinate Injectable 40 milliGRAM(s) IV Push every 24 hours  octreotide  Injectable 100 MICROGram(s) SubCutaneous two times a day  pantoprazole  Injectable 40 milliGRAM(s) IV Push every 12 hours                            6.7    10.58 )-----------( 94       ( 13 Jan 2025 06:10 )             20.8     01-13    141  |  103  |  20.3[H]  ----------------------------<  137[H]  3.8   |  27.0  |  0.47[L]    Ca    8.2[L]      13 Jan 2025 06:10      RECENT CULTURES:  01-11 @ 05:25 .Blood BLOOD     No growth at 48 Hours    01-11 @ 05:20 .Blood BLOOD     No growth at 48 Hours    01-10 @ 18:20 .Surgical Swab     No growth to date.    01-10 @ 12:20 .Blood BLOOD     No growth at 48 Hours    01-09 @ 04:57 .Blood BLOOD Staphylococcus aureus    Growth in aerobic bottle: Staphylococcus aureus  Growth in aerobic bottle: Gram Positive Cocci in Clusters    01-08 @ 06:20 .Blood BLOOD     Growth in aerobic and anaerobic bottles: Staphylococcus aureus  See previous culture [id number]  Growth in anaerobic bottle: Gram Positive Cocci in Clusters  Growth in aerobic bottle: Gram Positive Cocci in Clusters    01-07 @ 06:19 .Blood BLOOD     Growth in aerobic and anaerobic bottles: Staphylococcus aureus  See previous culture [id number]  Growth in aerobic bottle: Gram Positive Cocci in Clusters  Growth in anaerobic bottle: Gram Positive Cocci in Clusters    01-07 @ 06:15 .Blood BLOOD     Growth in aerobic and anaerobic bottles: Staphylococcus aureus  See previous culture [id number]  Growth in aerobic bottle: Gram Positive Cocci in Clusters  Growth in anaerobic bottle: Gram Positive Cocci in Clusters    01-06 @ 01:34 .Sputum Sputum Staphylococcus aureus    Moderate Staphylococcus aureus  Commensal sylvester consistent with body site  Moderate Squamous epithelial cells seen per low power field  Moderate polymorphonuclear leukocytes seen per low power field  Few Gram Negative Rods seen per oil power field  Few Gram positive cocci in pairs seen per oil power field  Rare Gram Positive Rods seen per oil power field  Rare Yeast like cells seen per oil power field    01-05 @ 12:34 Clean Catch Clean Catch (Midstream) Escherichia coli    10,000 - 49,000 CFU/mL Escherichia coli  <10,000 CFU/mL Normal Urogenital Sylvester    01-05 @ 10:54 .Blood BLOOD Blood Culture PCR  Staphylococcus aureus    Growth in aerobic and anaerobic bottles: Staphylococcus aureus  Direct identification is available within approximately 3-5  hours either by Blood Panel Multiplexed PCR or Direct  MALDI-TOF. Details: [email or web address]  Growth in anaerobic bottle: Gram Positive Cocci in Clusters  Growth in aerobic bottle: Gram Positive Cocci in Clusters        WBC Count: 10.58 K/uL (01-13-25 @ 06:10)  WBC Count: 12.54 K/uL (01-12-25 @ 09:00)  WBC Count: 11.54 K/uL (01-12-25 @ 01:36)  WBC Count: 10.18 K/uL (01-11-25 @ 21:00)  WBC Count: 6.78 K/uL (01-11-25 @ 05:20)  WBC Count: 18.73 K/uL (01-10-25 @ 12:20)  WBC Count: 12.56 K/uL (01-10-25 @ 05:02)  WBC Count: 12.45 K/uL (01-09-25 @ 20:00)  WBC Count: 13.35 K/uL (01-09-25 @ 14:38)  WBC Count: 12.33 K/uL (01-09-25 @ 04:57)  WBC Count: 11.98 K/uL (01-09-25 @ 00:08)    Creatinine: 0.47 mg/dL (01-13-25 @ 06:10)  Creatinine: 0.53 mg/dL (01-12-25 @ 09:00)  Creatinine: 0.47 mg/dL (01-11-25 @ 05:20)  Creatinine: 0.46 mg/dL (01-10-25 @ 05:02)  Creatinine: 0.54 mg/dL (01-09-25 @ 04:57)    Procalcitonin: 1.37 ng/mL (01-08-25 @ 06:20)  Procalcitonin: 2.32 ng/mL (01-07-25 @ 06:15)     SARS-CoV-2: NotDetec (01-05-25 @ 10:54)  SARS-CoV-2 Result: NotDetec (01-02-25 @ 19:20)    Influenza AH3 (RapRVP): Detected (01.05.25 @ 10:54)    Urinalysis + Microscopic Examination (01.06.25 @ 03:00)    pH Urine: 6.0   Urine Appearance: Clear   Color: Yellow   Specific Gravity: 1.015   Protein, Urine: Trace mg/dL   Glucose Qualitative, Urine: Negative mg/dL   Ketone - Urine: Negative mg/dL   Blood, Urine: Small   Bilirubin: Negative   Urobilinogen: 1.0 mg/dL   Leukocyte Esterase Concentration: Small   Nitrite: Negative   White Blood Cell - Urine: 6 /HPF   Red Blood Cell - Urine: 5 /HPF   Bacteria: Occasional /HPF   Epithelial Cells: 2 /HPF          < from: TTE Limited W or WO Ultrasound Enhancing Agent (01.06.25 @ 12:55) >  CONCLUSIONS:      1. Technically difficult image quality.   2. Left ventricular systolic function is normal with an ejection fraction visually estimated at 65 to 70 %.   3. Normal right ventricular cavity size, with normal wall thickness, and normal right ventricular systolic function.   4. Compared to the transthoracic echocardiogram performed on 7/21/2024, there have been no significant interval changes.    < end of copied text >

## 2025-01-13 NOTE — PROGRESS NOTE ADULT - SUBJECTIVE AND OBJECTIVE BOX
Patient is a 39y old  Female who presents with a chief complaint of sepsis (13 Jan 2025 10:06)    pt denies n/v/abd pain now. had dark bm. no dizziness,cp/sob  REVIEW OF SYSTEMS: All systems are reviewed and found to be negative except above    MEDICATIONS  (STANDING):  acetylcysteine 10%  Inhalation 4 milliLiter(s) Inhalation every 6 hours  chlorhexidine 2% Cloths 1 Application(s) Topical daily  dextrose 5%. 1000 milliLiter(s) (50 mL/Hr) IV Continuous <Continuous>  dextrose 5%. 1000 milliLiter(s) (100 mL/Hr) IV Continuous <Continuous>  dextrose 50% Injectable 25 Gram(s) IV Push once  dextrose 50% Injectable 12.5 Gram(s) IV Push once  dextrose 50% Injectable 25 Gram(s) IV Push once  fentaNYL PCA (50 MICROgram(s)/mL) 30 milliLiter(s) PCA Continuous PCA Continuous  glucagon  Injectable 1 milliGRAM(s) IntraMuscular once  insulin lispro (ADMELOG) corrective regimen sliding scale   SubCutaneous three times a day before meals  lactulose Syrup 10 Gram(s) Oral daily  levalbuterol Inhalation 1.25 milliGRAM(s) Inhalation every 6 hours  methadone    Tablet 10 milliGRAM(s) Oral at bedtime  methadone    Tablet 5 milliGRAM(s) Oral <User Schedule>  methylPREDNISolone sodium succinate Injectable 40 milliGRAM(s) IV Push every 24 hours  nafcillin  IVPB 2 Gram(s) IV Intermittent every 4 hours  octreotide  Injectable 100 MICROGram(s) SubCutaneous two times a day  pantoprazole  Injectable 40 milliGRAM(s) IV Push every 12 hours    MEDICATIONS  (PRN):  acetaminophen     Tablet .. 650 milliGRAM(s) Oral every 6 hours PRN Temp greater or equal to 38C (100.4F), Mild Pain (1 - 3)  aluminum hydroxide/magnesium hydroxide/simethicone Suspension 30 milliLiter(s) Oral every 4 hours PRN Dyspepsia  benzonatate 100 milliGRAM(s) Oral every 8 hours PRN Cough  dextrose Oral Gel 15 Gram(s) Oral once PRN Blood Glucose LESS THAN 70 milliGRAM(s)/deciliter  guaifenesin/dextromethorphan Oral Liquid 10 milliLiter(s) Oral every 4 hours PRN Cough  melatonin 3 milliGRAM(s) Oral at bedtime PRN Insomnia  naloxone Injectable 0.1 milliGRAM(s) IV Push every 3 minutes PRN For ANY of the following changes in patient status:  A. RR LESS THAN 10 breaths per minute, B. Oxygen saturation LESS THAN 90%, C. Sedation score of 6  ondansetron Injectable 4 milliGRAM(s) IV Push every 8 hours PRN Nausea and/or Vomiting      CAPILLARY BLOOD GLUCOSE      POCT Blood Glucose.: 136 mg/dL (13 Jan 2025 08:54)  POCT Blood Glucose.: 166 mg/dL (12 Jan 2025 22:36)  POCT Blood Glucose.: 203 mg/dL (12 Jan 2025 16:47)    I&O's Summary    12 Jan 2025 07:01  -  13 Jan 2025 07:00  --------------------------------------------------------  IN: 1200 mL / OUT: 1100 mL / NET: 100 mL        PHYSICAL EXAM:  Vital Signs Last 24 Hrs  T(C): 36.8 (13 Jan 2025 11:09), Max: 37.2 (13 Jan 2025 00:37)  T(F): 98.2 (13 Jan 2025 11:09), Max: 99 (13 Jan 2025 00:37)  HR: 102 (13 Jan 2025 11:09) (89 - 103)  BP: 105/77 (13 Jan 2025 11:09) (92/54 - 117/75)  BP(mean): 85 (13 Jan 2025 11:09) (67 - 86)  RR: 19 (13 Jan 2025 11:09) (14 - 19)  SpO2: 97% (13 Jan 2025 11:09) (95% - 100%)    Parameters below as of 13 Jan 2025 11:09  Patient On (Oxygen Delivery Method): nasal cannula  O2 Flow (L/min): 3      CONSTITUTIONAL: NAD,  EYES: PERRLA; conjunctiva and sclera clear  ENMT: Moist oral mucosa,   RESPIRATORY: Normal respiratory effort; lungs are clear to auscultation bilaterally  CARDIOVASCULAR: Regular rate and rhythm, normal S1 and S2, no murmur   EXTS: No lower extremity edema; Peripheral pulses are 2+ bilaterally  ABDOMEN: Nontender to palpation, normoactive bowel sounds, no rebound/guarding;   MUSCLOSKELETAL: no joint swelling or tenderness to palpation  PSYCH: affect appropriate  NEUROLOGY: A+O to person, place, and time; CN 2-12 are intact and symmetric; no gross sensory deficits;       LABS:                        6.7    10.58 )-----------( 94       ( 13 Jan 2025 06:10 )             20.8     01-13    141  |  103  |  20.3[H]  ----------------------------<  137[H]  3.8   |  27.0  |  0.47[L]    Ca    8.2[L]      13 Jan 2025 06:10            Urinalysis Basic - ( 13 Jan 2025 06:10 )    Color: x / Appearance: x / SG: x / pH: x  Gluc: 137 mg/dL / Ketone: x  / Bili: x / Urobili: x   Blood: x / Protein: x / Nitrite: x   Leuk Esterase: x / RBC: x / WBC x   Sq Epi: x / Non Sq Epi: x / Bacteria: x        Culture - Blood (collected 11 Jan 2025 05:25)  Source: .Blood BLOOD  Preliminary Report (13 Jan 2025 09:01):    No growth at 48 Hours    Culture - Blood (collected 11 Jan 2025 05:20)  Source: .Blood BLOOD  Preliminary Report (13 Jan 2025 09:01):    No growth at 48 Hours    Culture - Wound Aerobic/Anaerobic (collected 10 Cristhian 2025 18:20)  Source: .Surgical Swab  Preliminary Report (12 Jan 2025 08:37):    No growth to date.        RADIOLOGY & ADDITIONAL TESTS:  Results Reviewed:

## 2025-01-13 NOTE — PHYSICAL THERAPY INITIAL EVALUATION ADULT - ADDITIONAL COMMENTS
as per pt, lives with family (Able to assist as needed) in private house, has 4STE, 1-2 stairs inside down to bedroom, independent prior without DME, does not use home O2, does not drive  pt and RN stated she is able to transfer to the bedside commode independently

## 2025-01-13 NOTE — PHYSICAL THERAPY INITIAL EVALUATION ADULT - GENERAL OBSERVATIONS, REHAB EVAL
Pt received in bed with 3LO2 via NC, cardiac monitor, , IV, PCA pump. Pt agreeable to PT, reported 8/10 pain in back and under right breast.

## 2025-01-13 NOTE — PROGRESS NOTE ADULT - ASSESSMENT
39y  Female with h/o metastatic breast cancer ER/[place] Neg, HER-2 + on chemotherapy (last dose 12/26/24) (mets to lung, liver, spleen, spine, bone and brain), gastritis w/ intermittent episodes of dark stool (follows w/ Dr. Rosado GI and is on PPI and octreotide daily). Patient presented 1/5 with c/o worsening SOB.  She was seen in ED 1/2/25 for for weakness and SOB at which time she was found to have Hb of 5 requiring PRBC transfusion and positive for Flu A and started on tamiflu and was discharged from the ED on 1/3/25.  Her respiratory status has worsened since then w/ productive cough associated with body aches and chills.  Denies sick contacts but has had many frequent hospital visits. Patient has been afebrile, no leukocytosis. Was placed on BIPAP for Worsening respiratory status. continued on Tamiflu. Vancomycn and Zosyn was added. Blood cultures with MSSA. ID input requested.       MSSA bacteremia   Staphylococcus aureus PNA   Influenza A   metastatic breast cancer ER/IL Neg, HER-2 + on chemotherapy   Port in place s/p explant 1/10/25      - Blood cultures 1/5, 1/7, 1/9 reporting MSSA    - Repeat blood cultures 1/10 and 1/11/25 no growth   - Sputum Cx 1/6 Staphylococcus aureus  - Urine Cx 1/5 reporting 10k - 49k Escherichia coli  of ? significance since UA not concerning for UTI   - RVP/COVID 19 PCR + Influenza A  - S/P Port removal 1/10/25  - CTA Chest reporting No acute pulmonary embolism. Wide spread patchy airspace opacities bilaterally, increased since the prior exam.  - US RUQ reporting No evidence of acute cholecystitis or biliary ductal dilatation.  - Procalcitonin level 2.32 --> 1.37  - TTE 1/6 with no veg  - Called cardiology consult for MARYANN if possible   - GI eval noted, no plan for EGD   - Would remove Port given persistent Bacteremia   - Completed oseltamivir  1/10/25  - Continue Nafcillin 2gm IV [id number]  - Hold off on PICC/Midline for now unless needed for reasons other than infectious diseases  - Follow up cultures  - Trend Fever  - Trend WBC      Thank you for allowing me to participate in the care of your patient.   Will Follow    Discussed treatment plan with: Cardiology. Jessica SEQUEIRA and Clinical pharmacy

## 2025-01-13 NOTE — PROGRESS NOTE ADULT - ASSESSMENT
39y  Female with h/o metastatic breast cancer ER/MN Neg, HER-2 + on chemotherapy (last dose 12/26/24) (mets to lung, liver, spleen, spine, bone and brain), gastritis w/ intermittent episodes of dark stool (follows w/ Dr. Rosado GI and is on PPI and octreotide daily). Patient presented 1/5 with c/o worsening SOB.  She was seen in ED 1/2/25 for for weakness and SOB at which time she was found to have Hb of 5 requiring PRBC transfusion and positive for Flu A and started on tamiflu and was discharged from the ED on 1/3/25.  Her respiratory status has worsened since then w/ productive cough associated with body aches and chills. Admitted for sepsis ,acute hypoxic respiratory failure with flu A. Currently on HFNC for worsening respiratory status.S/P  Tamiflu. Vancomycn and Zosyn was added. Blood cultures with MSSA.  Cardiology consultation requested for evaluation of endocarditis.   Blood cultures 1/5, 1/7, 1/9 reporting MSSA , Repeat blood cultures ordered for q 48hours next one will be 1/11/25, Sputum Cx 1/6 Staphylococcus aureus. MARYANN hold for active gib requring transfusion. Pt had large BM likley diverticular bleed. Had recent scopy. Gi following.  Urine Cx 1/5 reporting 10k - 49k Escherichia coli  of ? significance  since UA not concerning for UTI.  RVP/COVID 19 PCR + Influenza A.      Acute blood loss anemia   GIB  -hb 6.9. will transfuse 1u prbc. repeat cbc after complete transfusion  -s/p multiple transfusion   -CTA abd/pelvis   -PPI/OCTRIO  -GI eval with no plan for scopes 2/2 recently done.  Still with bleeding.  reed GI history, ? GAVE disease per GI notes and prior EGD . H as had multiple EGD/colonoscopies in past as well. Per their current recommendations CTA is planned if recurrent bleeding. WILL F/U GI REC    Thrombocytopenia  likely from chemo/sepsis  -will monitor cbc        Sepsis 2/2 Flu A w/ superimposed multifocal PNA  Acute hypoxic respiratory failure 2/2 Flu A w/ superimposed multifocal PNA   - on NC, comfortable  - persistently positive RVP for flu A  - s/p  tamiflu through 1/10  - repeat CT 1/8 shows improved pleural effusion but worsening GGOs, unclear if infectious or malignant etiology  - repeat CT 1/9 with similar findings  - repeat CT next week to evaluate for improvement of infiltrates  - s/p  IV lasix 40mg qd x 2 days given recent blood transfusion  - c/w IV steroids, tapered to q24hr 1/9, continue over the weekend and change to PO if remains stable   - c/w abx per ID recs  - sputum clx growing moderate staph aureus   - TTE EF 65-70%, no WMA    MSSA bacteremia  - blood clx positive 1/5, repeat in process  - ID following  - c/w nafcillin   - MARYANN on hold for gib. 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   Thrombocytopenia likely 2/2 sepsis   Breast cancer ER/MN Neg, HER-2 pos on chemotherapy w/ mets to lung, liver, spleen, spine, bone and brain  - Baseline Hb ranges 8-9    - s/p prbc transfusion on 1/2, 1/8 and 1/9,1/11  - trend CBC, transfuse for hgb <7  - c/w home sub q octreotide and IV protonix  - GI consulted, suspect likely diverticular bleed and will likely resolved without intervention, monitor vitals and CBC  - Plts likely low from sepsis and no intervention required at this time  - Monitor CBC and transfuse for Hb<7 and plts<20K in setting of sepsis   - [facility] following  - monitor on tele and     Breast cancer ER/MN Neg, HER-2 pos on chemotherapy w/ mets to lung, liver, spleen, spine, bone and brain  - c/w pregabalin, methylphenidate (confirmed on ISTOP Reference #: [id number])  - Reports being on methadone for pain but last 30 day script on istop dispensed 09/19/24  - Is on po dilaudid 4mg prn w/ last script dispensed 12/04/24   - now on PCA pump  - pain management following, revert back to PO Dilaudid regimen on d/c .will f/u rec    dvt ppx scd'd  disposition: pending. improvement of bloody bm/anemia, pain control.  PT eval    39y  Female with h/o metastatic breast cancer ER/MT Neg, HER-2 + on chemotherapy (last dose 12/26/24) (mets to lung, liver, spleen, spine, bone and brain), gastritis w/ intermittent episodes of dark stool (follows w/ Dr. Rosado GI and is on PPI and octreotide daily). Patient presented 1/5 with c/o worsening SOB.  She was seen in ED 1/2/25 for for weakness and SOB at which time she was found to have Hb of 5 requiring PRBC transfusion and positive for Flu A and started on tamiflu and was discharged from the ED on 1/3/25.  Her respiratory status has worsened since then w/ productive cough associated with body aches and chills. Admitted for sepsis ,acute hypoxic respiratory failure with flu A. Currently on HFNC for worsening respiratory status.S/P  Tamiflu. Vancomycn and Zosyn was added. Blood cultures with MSSA.  Cardiology consultation requested for evaluation of endocarditis.   Blood cultures 1/5, 1/7, 1/9 reporting MSSA , Repeat blood cultures ordered for q 48hours next one will be 1/11/25, Sputum Cx 1/6 Staphylococcus aureus. MARYANN hold for active gib requring transfusion. Pt had large BM likley diverticular bleed. Had recent scopy. Gi following.  Urine Cx 1/5 reporting 10k - 49k Escherichia coli  of ? significance  since UA not concerning for UTI.  RVP/COVID 19 PCR + Influenza A.      Acute blood loss anemia   GIB  -hb 6.7. will transfuse 1u prbc. repeat cbc after complete transfusion  -s/p multiple transfusion   -CTA abd/pelvis   -PPI/OCTRIO  -GI eval with no plan for scopes 2/2 recently done.  Still with bleeding.  reed GI history, ? GAVE disease per GI notes and prior EGD . H as had multiple EGD/colonoscopies in past as well. Per their current recommendations CTA is planned if recurrent bleeding. WILL F/U GI REC    Thrombocytopenia  likely from chemo/sepsis  -will monitor cbc        Sepsis 2/2 Flu A w/ superimposed multifocal PNA  Acute hypoxic respiratory failure 2/2 Flu A w/ superimposed multifocal PNA   - on NC, comfortable  - persistently positive RVP for flu A  - s/p  tamiflu through 1/10  - repeat CT 1/8 shows improved pleural effusion but worsening GGOs, unclear if infectious or malignant etiology  - repeat CT 1/9 with similar findings  - repeat CT next week to evaluate for improvement of infiltrates  - s/p  IV lasix 40mg qd x 2 days given recent blood transfusion  - c/w IV steroids, tapered to q24hr 1/9, continue over the weekend and change to PO if remains stable   - c/w abx per ID recs  - sputum clx growing moderate staph aureus   - TTE EF 65-70%, no WMA    MSSA bacteremia  - blood clx positive 1/5, repeat in process  - ID following  - c/w nafcillin   - MARYANN on hold for gib. 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   Thrombocytopenia likely 2/2 sepsis   Breast cancer ER/MT Neg, HER-2 pos on chemotherapy w/ mets to lung, liver, spleen, spine, bone and brain  - Baseline Hb ranges 8-9    - s/p prbc transfusion on 1/2, 1/8 and 1/9,1/11  - trend CBC, transfuse for hgb <7  - c/w home sub q octreotide and IV protonix  - GI consulted, suspect likely diverticular bleed and will likely resolved without intervention, monitor vitals and CBC  - Plts likely low from sepsis and no intervention required at this time  - Monitor CBC and transfuse for Hb<7 and plts<20K in setting of sepsis   - [facility] following  - monitor on tele and     Breast cancer ER/MT Neg, HER-2 pos on chemotherapy w/ mets to lung, liver, spleen, spine, bone and brain  - c/w pregabalin, methylphenidate (confirmed on ISTOP Reference #: [id number])  - Reports being on methadone for pain but last 30 day script on istop dispensed 09/19/24  - Is on po dilaudid 4mg prn w/ last script dispensed 12/04/24   - now on PCA pump  - pain management following, revert back to PO Dilaudid regimen on d/c .will f/u rec    dvt ppx scd'd  disposition: pending. improvement of bloody bm/anemia, pain control.  PT eval   tish gi team-agreed with above plan  DW pain mx-plan to start fentanyl drip and dc PCA  TISH FIERRO

## 2025-01-13 NOTE — PROGRESS NOTE ADULT - ASSESSMENT
40 yo F with h/o metastatic breast cancer ER/MO Neg, HER-2 + on chemotherapy (last dose 12/26/24) (mets to lung, liver, spleen, spine, bone and brain).     Pain management following for pain control.    Recommendations:   - Continue dPCA 0/0.35/8/6   - Continue home Methadone 5mg po bid at 0600 and 1400   - Continue home Methadone 10mg po qHS   - Continue home Lyrica 200mg po q8h    When due for discharge:   - DC dPCA   - Recommend starting Dilaudid PO 4mg/8mg [id number] PRN mod/severe pain        Will continue to follow. Please call pain management with any questions [phone]   38 yo F with h/o metastatic breast cancer ER/GA Neg, HER-2 + on chemotherapy (last dose 12/26/24) (mets to lung, liver, spleen, spine, bone and brain).     Pain management following for pain control.    Recommendations:  - DC Dilaudid PCA   - Start Fentanyl PCA  0/15/6/400  - Continue home Methadone 5mg po bid at 0600 and 1400  - Continue home Methadone 10mg po qHS  - Continue home Lyrica 200mg po q8h    When due for discharge:   - DC Fentanyl PCA   - Recommend starting Dilaudid PO 4mg/8mg [id number] PRN mod/severe pain        Will continue to follow. Please call pain management with any questions [phone]

## 2025-01-13 NOTE — PROGRESS NOTE ADULT - ASSESSMENT
39y  Female with h/o metastatic breast cancer ER/SD Neg, HER-2 + on chemotherapy (last dose 12/26/24) (mets to lung, liver, spleen, spine, bone and brain), gastritis w/ intermittent episodes of dark stool (follows w/ Dr. Rosado GI and is on PPI and octreotide daily). Patient presented 1/5 with c/o worsening SOB.  She was seen in ED 1/2/25 for for weakness and SOB at which time she was found to have Hb of 5 requiring PRBC transfusion and positive for Flu A and started on tamiflu and was discharged from the ED on 1/3/25.  Her respiratory status has worsened since then w/ productive cough associated with body aches and chills.  Denies sick contacts but has had many frequent hospital visits. Patient has been afebrile, no leukocytosis. Was placed on BIPAP for Worsening respiratory status. continued on Tamiflu. Vancomycn and Zosyn was added. Blood cultures with MSSA.     Metastatic breast cancer   - last dose of trastuzumab was on 12/26/24.  -All treatment on hold at the moment.  -Follow up with primary oncologist, Dr. Chapa.    Cytopenias   - secondary to malignancy and acute illness.   -Plt 94K  - likely due to acute illness.  -  S/P 6 U PRBC   - hgb 6.7 today - will receive another 2 U   --GI evaluated for large bloody bowel movement  Suspect rectal bleeding is due to diverticular bleed which are self-limiting - If patient becomes hemodynamically unstable, requiring multiple transfusions, recommend getting CTA for further evaluation   Transfuse if hgb<7.0.    - GI f/u noted.  no plans for scope.  Previously scoped in Nov. 2024. internal hemorrhoids, gastric erosions, no active bleeding site     Respiratory failure   - multifocal pneumonia and flu+  -  oseltamivir through 1/10  - Management as per primary team.  - TTE 1/6 with no veg  Cardiology following- . No MARYANN at this time until stabilizes and source of bleed ascertained    MSSA Bacteremia  - cultures persistently positive. pt afebrile.   --  ID following -On nafcillin   - d/w ID.  suggested removing Port.  Agree with port removal and placement of PICC or midline prior to d/c for outpt chemo     Cont aggressive pain management. Currently on PCA pump.    Will follow

## 2025-01-13 NOTE — PROGRESS NOTE ADULT - SUBJECTIVE AND OBJECTIVE BOX
39y  Female with h/o metastatic breast cancer ER/SC Neg, HER-2 + on chemotherapy (last dose 12/26/24) (mets to lung, liver, spleen, spine, bone and brain), gastritis w/ intermittent episodes of dark stool (follows w/ Dr. Rosado GI and is on PPI and octreotide daily). Patient presented 1/5 with c/o worsening SOB.  She was seen in ED 1/2/25 for for weakness and SOB at which time she was found to have Hb of 5 requiring PRBC transfusion and positive for Flu A and started on tamiflu and was discharged from the ED on 1/3/25.  Her respiratory status has worsened since then w/ productive cough associated with body aches and chills.  Denies sick contacts but has had many frequent hospital visits. Patient has been afebrile, no leukocytosis. Was placed on BIPAP for Worsening respiratory status. continued on Tamiflu. Vancomycn and Zosyn was added. Blood cultures with MSSA.     PAST MEDICAL & SURGICAL HISTORY:  H/O compression fracture of spine  Anxiety  Metastatic breast cancer  H/O pleural effusion  Pericardial effusion  S/P tonsillectomy  H/O chest tube placement  12/23/21  S/P pericardiocentesis  12/28/21    Allergies  pertuzumab (Other (Severe))  Perjeta (Other (Severe))    MEDICATIONS  (STANDING):  acetylcysteine 10%  Inhalation 4 milliLiter(s) Inhalation every 6 hours  albuterol/ipratropium for Nebulization. 3 milliLiter(s) Nebulizer once  chlorhexidine 2% Cloths 1 Application(s) Topical daily  dextrose 5%. 1000 milliLiter(s) (50 mL/Hr) IV Continuous <Continuous>  dextrose 5%. 1000 milliLiter(s) (100 mL/Hr) IV Continuous <Continuous>  dextrose 50% Injectable 25 Gram(s) IV Push once  dextrose 50% Injectable 12.5 Gram(s) IV Push once  dextrose 50% Injectable 25 Gram(s) IV Push once  furosemide   Injectable 40 milliGRAM(s) IV Push daily  glucagon  Injectable 1 milliGRAM(s) IntraMuscular once  HYDROmorphone PCA (1 mG/mL) 30 milliLiter(s) PCA Continuous PCA Continuous  insulin lispro (ADMELOG) corrective regimen sliding scale   SubCutaneous three times a day before meals  levalbuterol Inhalation 1.25 milliGRAM(s) Inhalation every 6 hours  methadone    Tablet 10 milliGRAM(s) Oral at bedtime  methadone    Tablet 5 milliGRAM(s) Oral <User Schedule>  methylPREDNISolone sodium succinate Injectable 40 milliGRAM(s) IV Push every 24 hours  nafcillin  IVPB 2 Gram(s) IV Intermittent every 4 hours  octreotide  Injectable 100 MICROGram(s) SubCutaneous two times a day  pantoprazole  Injectable 40 milliGRAM(s) IV Push every 12 hours  pregabalin 200 milliGRAM(s) Oral every 8 hours    MEDICATIONS  (PRN):  acetaminophen     Tablet .. 650 milliGRAM(s) Oral every 6 hours PRN Temp greater or equal to 38C (100.4F), Mild Pain (1 - 3)  aluminum hydroxide/magnesium hydroxide/simethicone Suspension 30 milliLiter(s) Oral every 4 hours PRN Dyspepsia  benzonatate 100 milliGRAM(s) Oral every 8 hours PRN Cough  dextrose Oral Gel 15 Gram(s) Oral once PRN Blood Glucose LESS THAN 70 milliGRAM(s)/deciliter  guaifenesin/dextromethorphan Oral Liquid 10 milliLiter(s) Oral every 4 hours PRN Cough  melatonin 3 milliGRAM(s) Oral at bedtime PRN Insomnia  naloxone Injectable 0.1 milliGRAM(s) IV Push every 3 minutes PRN For ANY of the following changes in patient status:  A. RR LESS THAN 10 breaths per minute, B. Oxygen saturation LESS THAN 90%, C. Sedation score of 6  ondansetron Injectable 4 milliGRAM(s) IV Push every 8 hours PRN Nausea and/or Vomiting    Vital Signs Last 24 Hrs  T(C): 36.8 (13 Jan 2025 07:40), Max: 37.2 (13 Jan 2025 00:37)  T(F): 98.2 (13 Jan 2025 07:40), Max: 99 (13 Jan 2025 00:37)  HR: 96 (13 Jan 2025 08:54) (89 - 103)  BP: 108/67 (13 Jan 2025 08:13) (92/54 - 117/75)  BP(mean): 79 (13 Jan 2025 08:13) (67 - 86)  RR: 18 (13 Jan 2025 08:13) (14 - 20)  SpO2: 97% (13 Jan 2025 08:54) (97% - 100%)    Parameters below as of 13 Jan 2025 08:54  Patient On (Oxygen Delivery Method): nasal cannula, 2L      PE:  NAD  On O2 NC  bilateral rales  abd soft, nd, nt  a/o x 3      CBC                          6.7    10.58 )-----------( 94       ( 13 Jan 2025 06:10 )             20.8                             8.0    12.33 )-----------( 73       ( 09 Jan 2025 04:57 )             24.6                           6.9    11.64 )-----------( 102      ( 08 Jan 2025 06:20 )             22.0       CBC:            7.1    6.03  )-----------( 70       ( 01-07-25 @ 06:15 )             22.1       Chem:       01-13    141  |  103  |  20.3[H]  ----------------------------<  137[H]  3.8   |  27.0  |  0.47[L]    Ca    8.2[L]      13 Jan 2025 06:10        01-09    138  |  100  |  21.3[H]  ----------------------------<  152[H]  3.5   |  29.0  |  0.54    Ca    7.0[L]      09 Jan 2025 04:57  Mg     1.9     01-08    TPro  5.0[L]  /  Alb  2.5[L]  /  TBili  1.8  /  DBili  1.3[H]  /  AST  24  /  ALT  23  /  AlkPhos  238[H]  01-09 01-08    140  |  103  |  17.8  ----------------------------<  112[H]  3.5   |  27.0  |  0.56    Ca    6.8[L]      08 Jan 2025 06:20  Mg     1.9     01-08    TPro  5.4[L]  /  Alb  2.5[L]  /  TBili  1.9  /  DBili  1.3[H]  /  AST  24  /  ALT  25  /  AlkPhos  240[H]  01-08          ( 01-07-25 @ 06:15 )    139  |  106  |  17.5  ----------------------------<  394[H]  3.3[L]   |  20.0[L]  |  0.61    Liver Functions: ( 01-07-25 @ 06:15 )  Alb: 2.6 g/dL / Pro: 5.5 g/dL / ALK PHOS: 228 U/L / ALT: 22 U/L / AST: 16 U/L / GGT: x            Allergies  pertuzumab (Other (Severe))  Perjeta (Other (Severe))       REVIEW OF SYSTEMS:  Limited, patient is on BIPAP  no complaints

## 2025-01-14 LAB
ANION GAP SERPL CALC-SCNC: 8 MMOL/L — SIGNIFICANT CHANGE UP (ref 5–17)
BUN SERPL-MCNC: 13.4 MG/DL — SIGNIFICANT CHANGE UP (ref 8–20)
CALCIUM SERPL-MCNC: 8.1 MG/DL — LOW (ref 8.4–10.5)
CHLORIDE SERPL-SCNC: 103 MMOL/L — SIGNIFICANT CHANGE UP (ref 96–108)
CO2 SERPL-SCNC: 30 MMOL/L — HIGH (ref 22–29)
CREAT SERPL-MCNC: 0.54 MG/DL — SIGNIFICANT CHANGE UP (ref 0.5–1.3)
EGFR: 121 ML/MIN/1.73M2 — SIGNIFICANT CHANGE UP
GLUCOSE BLDC GLUCOMTR-MCNC: 125 MG/DL — HIGH (ref 70–99)
GLUCOSE BLDC GLUCOMTR-MCNC: 196 MG/DL — HIGH (ref 70–99)
GLUCOSE BLDC GLUCOMTR-MCNC: 207 MG/DL — HIGH (ref 70–99)
GLUCOSE BLDC GLUCOMTR-MCNC: 215 MG/DL — HIGH (ref 70–99)
GLUCOSE SERPL-MCNC: 127 MG/DL — HIGH (ref 70–99)
HCT VFR BLD CALC: 19.7 % — CRITICAL LOW (ref 34.5–45)
HCT VFR BLD CALC: 19.9 % — CRITICAL LOW (ref 34.5–45)
HCT VFR BLD CALC: 26.4 % — LOW (ref 34.5–45)
HGB BLD-MCNC: 6.4 G/DL — CRITICAL LOW (ref 11.5–15.5)
HGB BLD-MCNC: 6.5 G/DL — CRITICAL LOW (ref 11.5–15.5)
HGB BLD-MCNC: 8.7 G/DL — LOW (ref 11.5–15.5)
MCHC RBC-ENTMCNC: 28.5 PG — SIGNIFICANT CHANGE UP (ref 27–34)
MCHC RBC-ENTMCNC: 28.7 PG — SIGNIFICANT CHANGE UP (ref 27–34)
MCHC RBC-ENTMCNC: 29.4 PG — SIGNIFICANT CHANGE UP (ref 27–34)
MCHC RBC-ENTMCNC: 32.2 G/DL — SIGNIFICANT CHANGE UP (ref 32–36)
MCHC RBC-ENTMCNC: 33 G/DL — SIGNIFICANT CHANGE UP (ref 32–36)
MCHC RBC-ENTMCNC: 33 G/DL — SIGNIFICANT CHANGE UP (ref 32–36)
MCV RBC AUTO: 86.6 FL — SIGNIFICANT CHANGE UP (ref 80–100)
MCV RBC AUTO: 89.1 FL — SIGNIFICANT CHANGE UP (ref 80–100)
MCV RBC AUTO: 89.2 FL — SIGNIFICANT CHANGE UP (ref 80–100)
NRBC # BLD: 2 /100 WBCS — HIGH (ref 0–0)
NRBC # BLD: 4 /100 WBCS — HIGH (ref 0–0)
NRBC # BLD: 4 /100 WBCS — HIGH (ref 0–0)
NRBC BLD-RTO: 2 /100 WBCS — HIGH (ref 0–0)
NRBC BLD-RTO: 4 /100 WBCS — HIGH (ref 0–0)
NRBC BLD-RTO: 4 /100 WBCS — HIGH (ref 0–0)
PLATELET # BLD AUTO: 121 K/UL — LOW (ref 150–400)
PLATELET # BLD AUTO: 91 K/UL — LOW (ref 150–400)
PLATELET # BLD AUTO: 92 K/UL — LOW (ref 150–400)
POTASSIUM SERPL-MCNC: 3.4 MMOL/L — LOW (ref 3.5–5.3)
POTASSIUM SERPL-SCNC: 3.4 MMOL/L — LOW (ref 3.5–5.3)
RBC # BLD: 2.21 M/UL — LOW (ref 3.8–5.2)
RBC # BLD: 2.23 M/UL — LOW (ref 3.8–5.2)
RBC # BLD: 3.05 M/UL — LOW (ref 3.8–5.2)
RBC # FLD: 22.9 % — HIGH (ref 10.3–14.5)
RBC # FLD: 24 % — HIGH (ref 10.3–14.5)
RBC # FLD: 24 % — HIGH (ref 10.3–14.5)
SODIUM SERPL-SCNC: 141 MMOL/L — SIGNIFICANT CHANGE UP (ref 135–145)
WBC # BLD: 10.83 K/UL — HIGH (ref 3.8–10.5)
WBC # BLD: 8.77 K/UL — SIGNIFICANT CHANGE UP (ref 3.8–10.5)
WBC # BLD: 8.82 K/UL — SIGNIFICANT CHANGE UP (ref 3.8–10.5)
WBC # FLD AUTO: 10.83 K/UL — HIGH (ref 3.8–10.5)
WBC # FLD AUTO: 8.77 K/UL — SIGNIFICANT CHANGE UP (ref 3.8–10.5)
WBC # FLD AUTO: 8.82 K/UL — SIGNIFICANT CHANGE UP (ref 3.8–10.5)

## 2025-01-14 PROCEDURE — 99233 SBSQ HOSP IP/OBS HIGH 50: CPT

## 2025-01-14 PROCEDURE — G0545: CPT

## 2025-01-14 PROCEDURE — 99232 SBSQ HOSP IP/OBS MODERATE 35: CPT

## 2025-01-14 RX ORDER — HYDROCORTISONE 1 %
1 CREAM (GRAM) TOPICAL
Refills: 0 | Status: DISCONTINUED | OUTPATIENT
Start: 2025-01-14 | End: 2025-01-31

## 2025-01-14 RX ORDER — PREDNISONE 5 MG/1
40 TABLET ORAL DAILY
Refills: 0 | Status: DISCONTINUED | OUTPATIENT
Start: 2025-01-14 | End: 2025-01-16

## 2025-01-14 RX ORDER — PREGABALIN CAPSULES, CV 225 MG/1
200 CAPSULE ORAL EVERY 8 HOURS
Refills: 0 | Status: DISCONTINUED | OUTPATIENT
Start: 2025-01-14 | End: 2025-01-21

## 2025-01-14 RX ORDER — POTASSIUM CHLORIDE 750 MG/1
40 TABLET, EXTENDED RELEASE ORAL ONCE
Refills: 0 | Status: COMPLETED | OUTPATIENT
Start: 2025-01-14 | End: 2025-01-14

## 2025-01-14 RX ORDER — FENTANYL CITRATE 50 UG/ML
30 INJECTION INTRAMUSCULAR; INTRAVENOUS
Refills: 0 | Status: DISCONTINUED | OUTPATIENT
Start: 2025-01-14 | End: 2025-01-15

## 2025-01-14 RX ADMIN — FENTANYL CITRATE 30 MILLILITER(S): 50 INJECTION INTRAMUSCULAR; INTRAVENOUS at 13:20

## 2025-01-14 RX ADMIN — PANTOPRAZOLE 40 MILLIGRAM(S): 20 TABLET, DELAYED RELEASE ORAL at 06:23

## 2025-01-14 RX ADMIN — Medication 4 MILLILITER(S): at 14:44

## 2025-01-14 RX ADMIN — Medication 1.25 MILLIGRAM(S): at 21:20

## 2025-01-14 RX ADMIN — PREGABALIN CAPSULES, CV 200 MILLIGRAM(S): 225 CAPSULE ORAL at 21:40

## 2025-01-14 RX ADMIN — FENTANYL CITRATE 30 MILLILITER(S): 50 INJECTION INTRAMUSCULAR; INTRAVENOUS at 12:48

## 2025-01-14 RX ADMIN — DEXTROMETHORPHAN HBR AND GUAIFENESIN ORAL SOLUTION 10 MILLILITER(S): 10; 100 LIQUID ORAL at 21:07

## 2025-01-14 RX ADMIN — FENTANYL CITRATE 30 MILLILITER(S): 50 INJECTION INTRAMUSCULAR; INTRAVENOUS at 07:43

## 2025-01-14 RX ADMIN — OCTREOTIDE ACETATE 100 MICROGRAM(S): 1000 INJECTION INTRAVENOUS; SUBCUTANEOUS at 06:23

## 2025-01-14 RX ADMIN — Medication 2: at 16:31

## 2025-01-14 RX ADMIN — NAFCILLIN INJECTION 200 GRAM(S): 2 POWDER, FOR SOLUTION INTRAMUSCULAR; INTRAMUSCULAR; INTRAVENOUS at 21:08

## 2025-01-14 RX ADMIN — Medication 4 MILLILITER(S): at 08:41

## 2025-01-14 RX ADMIN — NAFCILLIN INJECTION 200 GRAM(S): 2 POWDER, FOR SOLUTION INTRAMUSCULAR; INTRAMUSCULAR; INTRAVENOUS at 02:05

## 2025-01-14 RX ADMIN — Medication 2: at 09:52

## 2025-01-14 RX ADMIN — Medication 4 MILLILITER(S): at 04:00

## 2025-01-14 RX ADMIN — NAFCILLIN INJECTION 200 GRAM(S): 2 POWDER, FOR SOLUTION INTRAMUSCULAR; INTRAMUSCULAR; INTRAVENOUS at 16:32

## 2025-01-14 RX ADMIN — PANTOPRAZOLE 40 MILLIGRAM(S): 20 TABLET, DELAYED RELEASE ORAL at 16:32

## 2025-01-14 RX ADMIN — DEXTROMETHORPHAN HBR AND GUAIFENESIN ORAL SOLUTION 10 MILLILITER(S): 10; 100 LIQUID ORAL at 15:49

## 2025-01-14 RX ADMIN — ACETAMINOPHEN, DIPHENHYDRAMINE HCL, PHENYLEPHRINE HCL 3 MILLIGRAM(S): 325; 25; 5 TABLET ORAL at 21:08

## 2025-01-14 RX ADMIN — METHADONE HYDROCHLORIDE 5 MILLIGRAM(S): 5 SOLUTION ORAL at 06:23

## 2025-01-14 RX ADMIN — Medication 1.25 MILLIGRAM(S): at 08:40

## 2025-01-14 RX ADMIN — ONDANSETRON 4 MILLIGRAM(S): 4 TABLET, ORALLY DISINTEGRATING ORAL at 03:45

## 2025-01-14 RX ADMIN — NAFCILLIN INJECTION 200 GRAM(S): 2 POWDER, FOR SOLUTION INTRAMUSCULAR; INTRAMUSCULAR; INTRAVENOUS at 13:45

## 2025-01-14 RX ADMIN — Medication 1.25 MILLIGRAM(S): at 14:44

## 2025-01-14 RX ADMIN — METHADONE HYDROCHLORIDE 10 MILLIGRAM(S): 5 SOLUTION ORAL at 21:08

## 2025-01-14 RX ADMIN — FENTANYL CITRATE 30 MILLILITER(S): 50 INJECTION INTRAMUSCULAR; INTRAVENOUS at 04:14

## 2025-01-14 RX ADMIN — DEXTROMETHORPHAN HBR AND GUAIFENESIN ORAL SOLUTION 10 MILLILITER(S): 10; 100 LIQUID ORAL at 06:25

## 2025-01-14 RX ADMIN — METHADONE HYDROCHLORIDE 5 MILLIGRAM(S): 5 SOLUTION ORAL at 13:45

## 2025-01-14 RX ADMIN — Medication 100 MILLIGRAM(S): at 15:49

## 2025-01-14 RX ADMIN — FENTANYL CITRATE 30 MILLILITER(S): 50 INJECTION INTRAMUSCULAR; INTRAVENOUS at 18:19

## 2025-01-14 RX ADMIN — OCTREOTIDE ACETATE 100 MICROGRAM(S): 1000 INJECTION INTRAVENOUS; SUBCUTANEOUS at 16:31

## 2025-01-14 RX ADMIN — POTASSIUM CHLORIDE 40 MILLIEQUIVALENT(S): 750 TABLET, EXTENDED RELEASE ORAL at 09:53

## 2025-01-14 RX ADMIN — Medication 100 MILLIGRAM(S): at 23:53

## 2025-01-14 RX ADMIN — PREDNISONE 40 MILLIGRAM(S): 5 TABLET ORAL at 12:24

## 2025-01-14 RX ADMIN — Medication 4 MILLILITER(S): at 21:21

## 2025-01-14 RX ADMIN — FENTANYL CITRATE 30 MILLILITER(S): 50 INJECTION INTRAMUSCULAR; INTRAVENOUS at 19:07

## 2025-01-14 RX ADMIN — ANTISEPTIC SURGICAL SCRUB 1 APPLICATION(S): 0.04 SOLUTION TOPICAL at 12:24

## 2025-01-14 RX ADMIN — ONDANSETRON 4 MILLIGRAM(S): 4 TABLET, ORALLY DISINTEGRATING ORAL at 11:19

## 2025-01-14 RX ADMIN — Medication 1 APPLICATION(S): at 12:26

## 2025-01-14 RX ADMIN — ONDANSETRON 4 MILLIGRAM(S): 4 TABLET, ORALLY DISINTEGRATING ORAL at 18:34

## 2025-01-14 RX ADMIN — NAFCILLIN INJECTION 200 GRAM(S): 2 POWDER, FOR SOLUTION INTRAMUSCULAR; INTRAMUSCULAR; INTRAVENOUS at 10:19

## 2025-01-14 RX ADMIN — NAFCILLIN INJECTION 200 GRAM(S): 2 POWDER, FOR SOLUTION INTRAMUSCULAR; INTRAMUSCULAR; INTRAVENOUS at 06:23

## 2025-01-14 RX ADMIN — Medication 1.25 MILLIGRAM(S): at 03:59

## 2025-01-14 NOTE — PROGRESS NOTE ADULT - ASSESSMENT
39y  Female with h/o metastatic breast cancer ER/NC Neg, HER-2 + on chemotherapy (last dose 12/26/24) (mets to lung, liver, spleen, spine, bone and brain), gastritis w/ intermittent episodes of dark stool (follows w/ Dr. Rosado GI and is on PPI and octreotide daily). Patient presented 1/5 with c/o worsening SOB.  She was seen in ED 1/2/25 for for weakness and SOB at which time she was found to have Hb of 5 requiring PRBC transfusion and positive for Flu A and started on tamiflu and was discharged from the ED on 1/3/25.  Her respiratory status has worsened since then w/ productive cough associated with body aches and chills.  Denies sick contacts but has had many frequent hospital visits. Patient has been afebrile, no leukocytosis. Was placed on BIPAP for Worsening respiratory status. continued on Tamiflu. Vancomycn and Zosyn was added. Blood cultures with MSSA.     Metastatic breast cancer   - last dose of trastuzumab was on 12/26/24.  -All treatment on hold at the moment.  -Follow up with primary oncologist, Dr. Chapa after discharge    Cytopenias   - secondary to malignancy and acute illness and now GIB  - Plt 92k  -  Hgb 6.7 S/P PRBC went up to 8.3 now back down to 6.4- S/P another UPRBC  -GI evaluated for large bloody bowel movement  Suspect rectal bleeding is due to diverticular bleed which are self-limiting - If patient becomes hemodynamically unstable, requiring multiple transfusions, recommend getting CTA for further evaluation   - GI f/u noted.  no plans for scope.  Previously scoped in Nov. 2024. internal hemorrhoids, gastric erosions, no active bleeding site   - c/w home sub q octreotide and IV protonix  -nTransfuse if hgb<7.0.      Respiratory failure   - multifocal pneumonia and flu+  - oseltamivir through 1/10  - Management as per primary team.  - TTE 1/6 with no veg  - Cardiology following- . No MARYANN at this time until stabilizes and source of bleed ascertained    MSSA Bacteremia  - cultures persistently positive. pt afebrile.   --  ID following -On nafcillin   - Would remove Port given persistent Bacteremia   -  Agree with port removal and placement of PICC or midline prior to d/c for outpt chemo     Cont aggressive pain management. Remains on PCA pump.    Will follow

## 2025-01-14 NOTE — PROGRESS NOTE ADULT - ASSESSMENT
40 yo F with h/o metastatic breast cancer ER/IN Neg, HER-2 + on chemotherapy (last dose 12/26/24) (mets to lung, liver, spleen, spine, bone and brain).     Pain management following for pain control.    Recommendations:  - increased Fentanyl PCA  0/20/6/600  - Continue home Methadone 5mg po bid at 0600 and 1400  - Continue home Methadone 10mg po qHS  - Continue home Lyrica 200mg po q8h    When due for discharge:   - DC Fentanyl PCA   - Recommend starting Dilaudid PO 4mg/8mg [id number] PRN mod/severe pain        Will continue to follow. Please call pain management with any questions [phone]

## 2025-01-14 NOTE — PROGRESS NOTE ADULT - ASSESSMENT
39y  Female with h/o metastatic breast cancer ER/DC Neg, HER-2 + on chemotherapy (last dose 12/26/24) (mets to lung, liver, spleen, spine, bone and brain), gastritis w/ intermittent episodes of dark stool (follows w/ Dr. Rosado GI and is on PPI and octreotide daily). Patient presented 1/5 with c/o worsening SOB.  She was seen in ED 1/2/25 for for weakness and SOB at which time she was found to have Hb of 5 requiring PRBC transfusion and positive for Flu A and started on tamiflu and was discharged from the ED on 1/3/25.  Her respiratory status has worsened since then w/ productive cough associated with body aches and chills. Admitted for sepsis ,acute hypoxic respiratory failure with flu A. Currently on HFNC for worsening respiratory status.S/P  Tamiflu. Vancomycn and Zosyn was added. Blood cultures with MSSA.  Cardiology consultation requested for evaluation of endocarditis.   Blood cultures 1/5, 1/7, 1/9 reporting MSSA , Repeat blood cultures ngtd on 1/11/25, Sputum Cx 1/6 Staphylococcus aureus. MARYANN hold for active gib requring transfusion. Pt had large BM likley diverticular bleed. Had recent scopy. Gi following. ct abd/pelvis w/iv contrast no active bleed (1/13). Urine Cx 1/5 reporting 10k - 49k Escherichia coli  of ? significance  since UA not concerning for UTI.  RVP/COVID 19 PCR + Influenza A.      Acute blood loss anemia   GIB  -hb 6.4 will transfuse 1u prbc. repeat cbc after complete transfusion  -s/p multiple transfusion   -CTA abd/pelvis  no active bleed   -PPI/OCTRIO  -GI eval with no plan for scopes 2/2 recently done.  Still with bleeding.  reed GI history, ? GAVE disease per GI notes and prior EGD . H as had multiple EGD/colonoscopies in past as well.   -hem/onc following      Thrombocytopenia  likely from chemo/sepsis  -will monitor cbc        Sepsis 2/2 Flu A w/ superimposed multifocal PNA  Acute hypoxic respiratory failure 2/2 Flu A w/ superimposed multifocal PNA   - on NC, comfortable  - persistently positive RVP for flu A  - s/p  tamiflu through 1/10  - repeat CT 1/8 shows improved pleural effusion but worsening GGOs, unclear if infectious or malignant etiology  - repeat CT 1/9 with similar findings  - repeat CT next week to evaluate for improvement of infiltrates  - s/p  IV lasix 40mg qd x 2 days given recent blood transfusion  - po steroid tapering dose  - c/w abx per ID recs  - sputum clx growing moderate staph aureus   - TTE EF 65-70%, no WMA    MSSA bacteremia  - blood clx positive 1/5, repeat bl c/s (1/11) ngtd  - ID following  - c/w nafcillin   - MARYANN on hold for gib. 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   Thrombocytopenia likely 2/2 sepsis   Breast cancer ER/DC Neg, HER-2 pos on chemotherapy w/ mets to lung, liver, spleen, spine, bone and brain  - Baseline Hb ranges 8-9    - s/p prbc transfusion on 1/2, 1/8 and 1/9,1/11,1/13,1/14  - trend CBC, transfuse for hgb <7  - c/w home sub q octreotide and IV protonix  - Monitor CBC and transfuse for Hb<7 and plts<20K in setting of sepsis   - [facility] following  - monitor on tele and     Breast cancer ER/DC Neg, HER-2 pos on chemotherapy w/ mets to lung, liver, spleen, spine, bone and brain  - c/w pregabalin, methylphenidate (confirmed on ISTOP Reference #: [id number])  - Reports being on methadone for pain but last 30 day script on istop dispensed 09/19/24  - Is on po dilaudid 4mg prn w/ last script dispensed 12/04/24   - now on fentanyl drip,methadon  - pain management following, revert back to PO Dilaudid regimen on d/c .will f/u rec    dvt ppx scd'd  disposition: pending. improvement of bloody bm/anemia, pain control.  PT davidal

## 2025-01-14 NOTE — PROGRESS NOTE ADULT - SUBJECTIVE AND OBJECTIVE BOX
39y  Female with h/o metastatic breast cancer ER/HI Neg, HER-2 + on chemotherapy (last dose 12/26/24) (mets to lung, liver, spleen, spine, bone and brain), gastritis w/ intermittent episodes of dark stool (follows w/ Dr. Rosado GI and is on PPI and octreotide daily). Patient presented 1/5 with c/o worsening SOB.  She was seen in ED 1/2/25 for for weakness and SOB at which time she was found to have Hb of 5 requiring PRBC transfusion and positive for Flu A and started on tamiflu and was discharged from the ED on 1/3/25.  Her respiratory status has worsened since then w/ productive cough associated with body aches and chills.  Denies sick contacts but has had many frequent hospital visits. Patient has been afebrile, no leukocytosis. Was placed on BIPAP for Worsening respiratory status. continued on Tamiflu. Vancomycn and Zosyn was added. Blood cultures with MSSA.     PAST MEDICAL & SURGICAL HISTORY:  H/O compression fracture of spine  Anxiety  Metastatic breast cancer  H/O pleural effusion  Pericardial effusion  S/P tonsillectomy  H/O chest tube placement  12/23/21  S/P pericardiocentesis  12/28/21    Allergies  pertuzumab (Other (Severe))  Perjeta (Other (Severe))    MEDICATIONS  (STANDING):  acetylcysteine 10%  Inhalation 4 milliLiter(s) Inhalation every 6 hours  albuterol/ipratropium for Nebulization. 3 milliLiter(s) Nebulizer once  chlorhexidine 2% Cloths 1 Application(s) Topical daily  dextrose 5%. 1000 milliLiter(s) (50 mL/Hr) IV Continuous <Continuous>  dextrose 5%. 1000 milliLiter(s) (100 mL/Hr) IV Continuous <Continuous>  dextrose 50% Injectable 25 Gram(s) IV Push once  dextrose 50% Injectable 12.5 Gram(s) IV Push once  dextrose 50% Injectable 25 Gram(s) IV Push once  furosemide   Injectable 40 milliGRAM(s) IV Push daily  glucagon  Injectable 1 milliGRAM(s) IntraMuscular once  HYDROmorphone PCA (1 mG/mL) 30 milliLiter(s) PCA Continuous PCA Continuous  insulin lispro (ADMELOG) corrective regimen sliding scale   SubCutaneous three times a day before meals  levalbuterol Inhalation 1.25 milliGRAM(s) Inhalation every 6 hours  methadone    Tablet 10 milliGRAM(s) Oral at bedtime  methadone    Tablet 5 milliGRAM(s) Oral <User Schedule>  methylPREDNISolone sodium succinate Injectable 40 milliGRAM(s) IV Push every 24 hours  nafcillin  IVPB 2 Gram(s) IV Intermittent every 4 hours  octreotide  Injectable 100 MICROGram(s) SubCutaneous two times a day  pantoprazole  Injectable 40 milliGRAM(s) IV Push every 12 hours  pregabalin 200 milliGRAM(s) Oral every 8 hours    MEDICATIONS  (PRN):  acetaminophen     Tablet .. 650 milliGRAM(s) Oral every 6 hours PRN Temp greater or equal to 38C (100.4F), Mild Pain (1 - 3)  aluminum hydroxide/magnesium hydroxide/simethicone Suspension 30 milliLiter(s) Oral every 4 hours PRN Dyspepsia  benzonatate 100 milliGRAM(s) Oral every 8 hours PRN Cough  dextrose Oral Gel 15 Gram(s) Oral once PRN Blood Glucose LESS THAN 70 milliGRAM(s)/deciliter  guaifenesin/dextromethorphan Oral Liquid 10 milliLiter(s) Oral every 4 hours PRN Cough  melatonin 3 milliGRAM(s) Oral at bedtime PRN Insomnia  naloxone Injectable 0.1 milliGRAM(s) IV Push every 3 minutes PRN For ANY of the following changes in patient status:  A. RR LESS THAN 10 breaths per minute, B. Oxygen saturation LESS THAN 90%, C. Sedation score of 6  ondansetron Injectable 4 milliGRAM(s) IV Push every 8 hours PRN Nausea and/or Vomiting    ICU Vital Signs Last 24 Hrs  T(C): 36.8 (14 Jan 2025 13:18), Max: 36.8 (13 Jan 2025 15:05)  T(F): 98.2 (14 Jan 2025 13:18), Max: 98.3 (13 Jan 2025 15:56)  HR: 79 (14 Jan 2025 13:18) (75 - 108)  BP: 98/68 (14 Jan 2025 13:18) (90/59 - 123/76)  BP(mean): 78 (14 Jan 2025 13:18) (63 - 90)  ABP: --  ABP(mean): --  RR: 18 (14 Jan 2025 13:18) (18 - 20)  SpO2: 91% (14 Jan 2025 13:18) (91% - 100%)    O2 Parameters below as of 14 Jan 2025 13:18  Patient On (Oxygen Delivery Method): nasal cannula  O2 Flow (L/min): 3      PE:  NAD  On O2 NC  bilateral rales  abd soft, nd, nt  a/o x 3      CBC                        6.4    8.82  )-----------( 92       ( 14 Jan 2025 07:31 )             19.9                             6.7    10.58 )-----------( 94       ( 13 Jan 2025 06:10 )             20.8         Chem:       01-14    141  |  103  |  13.4  ----------------------------<  127[H]  3.4[L]   |  30.0[H]  |  0.54    Ca    8.1[L]      14 Jan 2025 04:48        01-13    141  |  103  |  20.3[H]  ----------------------------<  137[H]  3.8   |  27.0  |  0.47[L]    Ca    8.2[L]      13 Jan 2025 06:10        01-09    138  |  100  |  21.3[H]  ----------------------------<  152[H]  3.5   |  29.0  |  0.54    Ca    7.0[L]      09 Jan 2025 04:57  Mg     1.9     01-08    TPro  5.0[L]  /  Alb  2.5[L]  /  TBili  1.8  /  DBili  1.3[H]  /  AST  24  /  ALT  23  /  AlkPhos  238[H]  01-09 01-08    140  |  103  |  17.8  ----------------------------<  112[H]  3.5   |  27.0  |  0.56    Ca    6.8[L]      08 Jan 2025 06:20  Mg     1.9     01-08    TPro  5.4[L]  /  Alb  2.5[L]  /  TBili  1.9  /  DBili  1.3[H]  /  AST  24  /  ALT  25  /  AlkPhos  240[H]  01-08          ( 01-07-25 @ 06:15 )    139  |  106  |  17.5  ----------------------------<  394[H]  3.3[L]   |  20.0[L]  |  0.61    Liver Functions: ( 01-07-25 @ 06:15 )  Alb: 2.6 g/dL / Pro: 5.5 g/dL / ALK PHOS: 228 U/L / ALT: 22 U/L / AST: 16 U/L / GGT: x

## 2025-01-14 NOTE — PROGRESS NOTE ADULT - SUBJECTIVE AND OBJECTIVE BOX
< from: CT Abdomen and Pelvis w/wo IV Cont (01.13.25 @ 12:42) >  IMPRESSION: No evidence of active intraluminal extravasation of contrast.    < end of copied text >

## 2025-01-14 NOTE — PROGRESS NOTE ADULT - SUBJECTIVE AND OBJECTIVE BOX
[facility]  INFECTIOUS DISEASES at [place] / [place] / [place]  =======================================================                              Salazar Toro MD                              Professor Emeritus:  Dr Warner Mcintosh MD            Diplomates American Board of Internal Medicine & Infectious Diseases                                   Tel  [phone] Fax [phone]                                  Hospital Consult line:  [phone]  =======================================================      [place] [id number]    Follow up: MSSA bacteremia    No fevers       Allergies:  pertuzumab (Other (Severe))  Perjeta (Other (Severe))        REVIEW OF SYSTEMS:  CONSTITUTIONAL:  No Fever or chills  HEENT:   No diplopia or blurred vision.  No earache, sore throat or runny nose.  CARDIOVASCULAR:  No Chest Pain  RESPIRATORY:  No cough, shortness of breath  GASTROINTESTINAL:  No nausea, vomiting or diarrhea.  GENITOURINARY:  No dysuria, frequency or urgency. No Blood in urine  MUSCULOSKELETAL:  no joint aches, no muscle pain  SKIN:  No change in skin, hair or nails.  NEUROLOGIC:  No Headaches      Physical Exam:  GEN: NAD  HEENT: normocephalic and atraumatic.    NECK: Supple.   LUNGS: CTA B/L.  HEART: RRR  ABDOMEN: Soft, NT, ND.  +BS.    : No CVA tenderness  EXTREMITIES: Without  edema.  MSK: No joint swelling  NEUROLOGIC: No Focal Deficits   SKIN: No rash      Vitals:  T(F): 98.2 (14 Jan 2025 13:18), Max: 98.5 (13 Jan 2025 14:01)  HR: 79 (14 Jan 2025 13:18)  BP: 98/68 (14 Jan 2025 13:18)  RR: 18 (14 Jan 2025 13:18)  SpO2: 91% (14 Jan 2025 13:18) (91% - 100%)  temp max in last 48H T(F): , Max: 99 (01-13-25 @ 00:37)    Current Antibiotics:  nafcillin  IVPB 2 Gram(s) IV Intermittent every 4 hours    Other medications:  acetylcysteine 10%  Inhalation 4 milliLiter(s) Inhalation every 6 hours  chlorhexidine 2% Cloths 1 Application(s) Topical daily  dextrose 5%. 1000 milliLiter(s) IV Continuous <Continuous>  dextrose 5%. 1000 milliLiter(s) IV Continuous <Continuous>  dextrose 50% Injectable 25 Gram(s) IV Push once  dextrose 50% Injectable 12.5 Gram(s) IV Push once  dextrose 50% Injectable 25 Gram(s) IV Push once  fentaNYL PCA (50 MICROgram(s)/mL) 30 milliLiter(s) PCA Continuous PCA Continuous  glucagon  Injectable 1 milliGRAM(s) IntraMuscular once  hydrocortisone 2.5% Rectal Cream 1 Application(s) Rectal two times a day  insulin lispro (ADMELOG) corrective regimen sliding scale   SubCutaneous three times a day before meals  lactulose Syrup 10 Gram(s) Oral daily  levalbuterol Inhalation 1.25 milliGRAM(s) Inhalation every 6 hours  methadone    Tablet 10 milliGRAM(s) Oral at bedtime  methadone    Tablet 5 milliGRAM(s) Oral <User Schedule>  octreotide  Injectable 100 MICROGram(s) SubCutaneous two times a day  pantoprazole  Injectable 40 milliGRAM(s) IV Push every 12 hours  predniSONE   Tablet 40 milliGRAM(s) Oral daily                            6.4    8.82  )-----------( 92       ( 14 Jan 2025 07:31 )             19.9     01-14    141  |  103  |  13.4  ----------------------------<  127[H]  3.4[L]   |  30.0[H]  |  0.54    Ca    8.1[L]      14 Jan 2025 04:48      RECENT CULTURES:  01-11 @ 05:25 .Blood BLOOD     No growth at 72 Hours    01-11 @ 05:20 .Blood BLOOD     No growth at 72 Hours    01-10 @ 18:20 .Surgical Swab     No growth to date.    01-10 @ 12:20 .Blood BLOOD     No growth at 72 Hours    01-09 @ 04:57 .Blood BLOOD Staphylococcus aureus    Growth in aerobic bottle: Staphylococcus aureus  Growth in aerobic bottle: Gram Positive Cocci in Clusters    01-08 @ 06:20 .Blood BLOOD     Growth in aerobic and anaerobic bottles: Staphylococcus aureus  See previous culture [id number]  Growth in anaerobic bottle: Gram Positive Cocci in Clusters  Growth in aerobic bottle: Gram Positive Cocci in Clusters    01-07 @ 06:19 .Blood BLOOD     Growth in aerobic and anaerobic bottles: Staphylococcus aureus  See previous culture [id number]  Growth in aerobic bottle: Gram Positive Cocci in Clusters  Growth in anaerobic bottle: Gram Positive Cocci in Clusters    01-07 @ 06:15 .Blood BLOOD     Growth in aerobic and anaerobic bottles: Staphylococcus aureus  See previous culture [id number]  Growth in aerobic bottle: Gram Positive Cocci in Clusters  Growth in anaerobic bottle: Gram Positive Cocci in Clusters    01-06 @ 01:34 .Sputum Sputum Staphylococcus aureus    Moderate Staphylococcus aureus  Commensal sylvester consistent with body site  Moderate Squamous epithelial cells seen per low power field  Moderate polymorphonuclear leukocytes seen per low power field  Few Gram Negative Rods seen per oil power field  Few Gram positive cocci in pairs seen per oil power field  Rare Gram Positive Rods seen per oil power field  Rare Yeast like cells seen per oil power field    01-05 @ 12:34 Clean Catch Clean Catch (Midstream) Escherichia coli    10,000 - 49,000 CFU/mL Escherichia coli  <10,000 CFU/mL Normal Urogenital Sylvester    01-05 @ 10:54 .Blood BLOOD Blood Culture PCR  Staphylococcus aureus    Growth in aerobic and anaerobic bottles: Staphylococcus aureus  Direct identification is available within approximately 3-5  hours either by Blood Panel Multiplexed PCR or Direct  MALDI-TOF. Details: [email or web address]  Growth in anaerobic bottle: Gram Positive Cocci in Clusters  Growth in aerobic bottle: Gram Positive Cocci in Clusters    WBC Count: 8.82 K/uL (01-14-25 @ 07:31)  WBC Count: 8.77 K/uL (01-14-25 @ 04:48)  WBC Count: 16.34 K/uL (01-13-25 @ 16:31)  WBC Count: 10.58 K/uL (01-13-25 @ 06:10)  WBC Count: 12.54 K/uL (01-12-25 @ 09:00)  WBC Count: 11.54 K/uL (01-12-25 @ 01:36)  WBC Count: 10.18 K/uL (01-11-25 @ 21:00)  WBC Count: 6.78 K/uL (01-11-25 @ 05:20)  WBC Count: 18.73 K/uL (01-10-25 @ 12:20)  WBC Count: 12.56 K/uL (01-10-25 @ 05:02)  WBC Count: 12.45 K/uL (01-09-25 @ 20:00)  WBC Count: 13.35 K/uL (01-09-25 @ 14:38)    Creatinine: 0.54 mg/dL (01-14-25 @ 04:48)  Creatinine: 0.47 mg/dL (01-13-25 @ 06:10)  Creatinine: 0.53 mg/dL (01-12-25 @ 09:00)  Creatinine: 0.47 mg/dL (01-11-25 @ 05:20)  Creatinine: 0.46 mg/dL (01-10-25 @ 05:02)    Procalcitonin: 1.37 ng/mL (01-08-25 @ 06:20)  Procalcitonin: 2.32 ng/mL (01-07-25 @ 06:15)     SARS-CoV-2: NotDetec (01-05-25 @ 10:54)  SARS-CoV-2 Result: NotDetec (01-02-25 @ 19:20)          Influenza AH3 (RapRVP): Detected (01.05.25 @ 10:54)    Urinalysis + Microscopic Examination (01.06.25 @ 03:00)    pH Urine: 6.0   Urine Appearance: Clear   Color: Yellow   Specific Gravity: 1.015   Protein, Urine: Trace mg/dL   Glucose Qualitative, Urine: Negative mg/dL   Ketone - Urine: Negative mg/dL   Blood, Urine: Small   Bilirubin: Negative   Urobilinogen: 1.0 mg/dL   Leukocyte Esterase Concentration: Small   Nitrite: Negative   White Blood Cell - Urine: 6 /HPF   Red Blood Cell - Urine: 5 /HPF   Bacteria: Occasional /HPF   Epithelial Cells: 2 /HPF          < from: TTE Limited W or WO Ultrasound Enhancing Agent (01.06.25 @ 12:55) >  CONCLUSIONS:      1. Technically difficult image quality.   2. Left ventricular systolic function is normal with an ejection fraction visually estimated at 65 to 70 %.   3. Normal right ventricular cavity size, with normal wall thickness, and normal right ventricular systolic function.   4. Compared to the transthoracic echocardiogram performed on 7/21/2024, there have been no significant interval changes.    < end of copied text >

## 2025-01-14 NOTE — PROGRESS NOTE ADULT - SUBJECTIVE AND OBJECTIVE BOX
Interval Hx:  Patient seen during rounds  Patient reports pain to be mod controlled on current medications  Patient denies sedation with medications   maxing PCA 4hr limit        Analgesic Dosing for past 24 hours reviewed as below:    melatonin   3 milliGRAM(s) Oral (01-13-25 @ 23:11)    methadone    Tablet   10 milliGRAM(s) Oral (01-13-25 @ 22:00)    methadone    Tablet   5 milliGRAM(s) Oral (01-14-25 @ 06:23)   5 milliGRAM(s) Oral (01-13-25 @ 14:41)    ondansetron Injectable   4 milliGRAM(s) IV Push (01-14-25 @ 11:19)   4 milliGRAM(s) IV Push (01-14-25 @ 03:45)   4 milliGRAM(s) IV Push (01-13-25 @ 16:51)          T(C): 36.7 (01-14-25 @ 10:00), Max: 36.9 (01-13-25 @ 14:01)  HR: 94 (01-14-25 @ 10:00) (84 - 110)  BP: 110/61 (01-14-25 @ 10:00) (90/59 - 123/76)  RR: 19 (01-14-25 @ 10:00) (18 - 20)  SpO2: 98% (01-14-25 @ 10:00) (92% - 100%)      01-13-25 @ 07:01  -  01-14-25 @ 07:00  --------------------------------------------------------  IN: 314 mL / OUT: 900 mL / NET: -586 mL        acetaminophen     Tablet .. 650 milliGRAM(s) Oral every 6 hours PRN  acetylcysteine 10%  Inhalation 4 milliLiter(s) Inhalation every 6 hours  aluminum hydroxide/magnesium hydroxide/simethicone Suspension 30 milliLiter(s) Oral every 4 hours PRN  benzonatate 100 milliGRAM(s) Oral every 8 hours PRN  chlorhexidine 2% Cloths 1 Application(s) Topical daily  dextrose 5%. 1000 milliLiter(s) IV Continuous <Continuous>  dextrose 5%. 1000 milliLiter(s) IV Continuous <Continuous>  dextrose 50% Injectable 25 Gram(s) IV Push once  dextrose 50% Injectable 12.5 Gram(s) IV Push once  dextrose 50% Injectable 25 Gram(s) IV Push once  dextrose Oral Gel 15 Gram(s) Oral once PRN  fentaNYL PCA (50 MICROgram(s)/mL) 30 milliLiter(s) PCA Continuous PCA Continuous  glucagon  Injectable 1 milliGRAM(s) IntraMuscular once  guaifenesin/dextromethorphan Oral Liquid 10 milliLiter(s) Oral every 4 hours PRN  hydrocortisone 2.5% Rectal Cream 1 Application(s) Rectal two times a day  insulin lispro (ADMELOG) corrective regimen sliding scale   SubCutaneous three times a day before meals  lactulose Syrup 10 Gram(s) Oral daily  levalbuterol Inhalation 1.25 milliGRAM(s) Inhalation every 6 hours  melatonin 3 milliGRAM(s) Oral at bedtime PRN  methadone    Tablet 10 milliGRAM(s) Oral at bedtime  methadone    Tablet 5 milliGRAM(s) Oral <User Schedule>  nafcillin  IVPB 2 Gram(s) IV Intermittent every 4 hours  naloxone Injectable 0.1 milliGRAM(s) IV Push every 3 minutes PRN  octreotide  Injectable 100 MICROGram(s) SubCutaneous two times a day  ondansetron Injectable 4 milliGRAM(s) IV Push every 8 hours PRN  pantoprazole  Injectable 40 milliGRAM(s) IV Push every 12 hours  predniSONE   Tablet 40 milliGRAM(s) Oral daily                          6.4    8.82  )-----------( 92       ( 14 Jan 2025 07:31 )             19.9     01-14    141  |  103  |  13.4  ----------------------------<  127[H]  3.4[L]   |  30.0[H]  |  0.54    Ca    8.1[L]      14 Jan 2025 04:48        Urinalysis Basic - ( 14 Jan 2025 04:48 )    Color: x / Appearance: x / SG: x / pH: x  Gluc: 127 mg/dL / Ketone: x  / Bili: x / Urobili: x   Blood: x / Protein: x / Nitrite: x   Leuk Esterase: x / RBC: x / WBC x   Sq Epi: x / Non Sq Epi: x / Bacteria: x        Pain Service   [phone]

## 2025-01-14 NOTE — PROGRESS NOTE ADULT - ASSESSMENT
39y  Female with h/o metastatic breast cancer ER/KS Neg, HER-2 + on chemotherapy (last dose 12/26/24) (mets to lung, liver, spleen, spine, bone and brain), gastritis w/ intermittent episodes of dark stool (follows w/ Dr. Rosado GI and is on PPI and octreotide daily). Patient presented 1/5 with c/o worsening SOB.  She was seen in ED 1/2/25 for for weakness and SOB at which time she was found to have Hb of 5 requiring PRBC transfusion and positive for Flu A and started on tamiflu and was discharged from the ED on 1/3/25.  Her respiratory status has worsened since then w/ productive cough associated with body aches and chills.  Denies sick contacts but has had many frequent hospital visits. Patient has been afebrile, no leukocytosis. Was placed on BIPAP for Worsening respiratory status. continued on Tamiflu. Vancomycn and Zosyn was added. Blood cultures with MSSA. ID input requested.       MSSA bacteremia   Staphylococcus aureus PNA   Influenza A   metastatic breast cancer ER/KS Neg, HER-2 + on chemotherapy   Port in place s/p explant 1/10/25      - Blood cultures 1/5, 1/7, 1/9 reporting MSSA    - Repeat blood cultures 1/10 and 1/11/25 no growth   - Sputum Cx 1/6 Staphylococcus aureus  - Urine Cx 1/5 reporting 10k - 49k Escherichia coli  of ? significance since UA not concerning for UTI   - RVP/COVID 19 PCR + Influenza A  - S/P Port removal 1/10/25  - CTA Chest reporting No acute pulmonary embolism. Wide spread patchy airspace opacities bilaterally, increased since the prior exam.  - US RUQ reporting No evidence of acute cholecystitis or biliary ductal dilatation.  - Procalcitonin level 2.32 --> 1.37  - TTE 1/6 with no veg  - Called cardiology consult for MARYANN if possible   - GI eval noted, no plan for EGD   - Would remove Port given persistent Bacteremia   - Completed oseltamivir  1/10/25  - Continue Nafcillin 2gm IV [id number]  - Hold off on PICC/Midline for now unless needed for reasons other than infectious diseases  - Follow up cultures  - Trend Fever  - Trend WBC      Thank you for allowing me to participate in the care of your patient.   Will Follow    Discussed treatment plan with: Cardiology. Jessica SEQUEIRA and Clinical pharmacy

## 2025-01-15 LAB
ANION GAP SERPL CALC-SCNC: 11 MMOL/L — SIGNIFICANT CHANGE UP (ref 5–17)
BUN SERPL-MCNC: 12.3 MG/DL — SIGNIFICANT CHANGE UP (ref 8–20)
CALCIUM SERPL-MCNC: 8.1 MG/DL — LOW (ref 8.4–10.5)
CHLORIDE SERPL-SCNC: 102 MMOL/L — SIGNIFICANT CHANGE UP (ref 96–108)
CO2 SERPL-SCNC: 25 MMOL/L — SIGNIFICANT CHANGE UP (ref 22–29)
CREAT SERPL-MCNC: 0.67 MG/DL — SIGNIFICANT CHANGE UP (ref 0.5–1.3)
CULTURE RESULTS: SIGNIFICANT CHANGE UP
EGFR: 115 ML/MIN/1.73M2 — SIGNIFICANT CHANGE UP
G LAMBLIA DNA STL QL NAA+NON-PROBE: DETECTED
GI PCR PANEL: DETECTED
GLUCOSE BLDC GLUCOMTR-MCNC: 123 MG/DL — HIGH (ref 70–99)
GLUCOSE BLDC GLUCOMTR-MCNC: 147 MG/DL — HIGH (ref 70–99)
GLUCOSE BLDC GLUCOMTR-MCNC: 148 MG/DL — HIGH (ref 70–99)
GLUCOSE BLDC GLUCOMTR-MCNC: 161 MG/DL — HIGH (ref 70–99)
GLUCOSE SERPL-MCNC: 135 MG/DL — HIGH (ref 70–99)
HCT VFR BLD CALC: 25.4 % — LOW (ref 34.5–45)
HGB BLD-MCNC: 7.9 G/DL — LOW (ref 11.5–15.5)
MCHC RBC-ENTMCNC: 28.4 PG — SIGNIFICANT CHANGE UP (ref 27–34)
MCHC RBC-ENTMCNC: 31.1 G/DL — LOW (ref 32–36)
MCV RBC AUTO: 91.4 FL — SIGNIFICANT CHANGE UP (ref 80–100)
NOROVIRUS GI+II RNA STL QL NAA+NON-PROBE: DETECTED
NRBC # BLD: 4 /100 WBCS — HIGH (ref 0–0)
NRBC BLD-RTO: 4 /100 WBCS — HIGH (ref 0–0)
PLATELET # BLD AUTO: 123 K/UL — LOW (ref 150–400)
POTASSIUM SERPL-MCNC: 3.6 MMOL/L — SIGNIFICANT CHANGE UP (ref 3.5–5.3)
POTASSIUM SERPL-SCNC: 3.6 MMOL/L — SIGNIFICANT CHANGE UP (ref 3.5–5.3)
RBC # BLD: 2.78 M/UL — LOW (ref 3.8–5.2)
RBC # FLD: 24.5 % — HIGH (ref 10.3–14.5)
SODIUM SERPL-SCNC: 138 MMOL/L — SIGNIFICANT CHANGE UP (ref 135–145)
SPECIMEN SOURCE: SIGNIFICANT CHANGE UP
WBC # BLD: 10.16 K/UL — SIGNIFICANT CHANGE UP (ref 3.8–10.5)
WBC # FLD AUTO: 10.16 K/UL — SIGNIFICANT CHANGE UP (ref 3.8–10.5)

## 2025-01-15 PROCEDURE — 99233 SBSQ HOSP IP/OBS HIGH 50: CPT

## 2025-01-15 PROCEDURE — 99232 SBSQ HOSP IP/OBS MODERATE 35: CPT

## 2025-01-15 PROCEDURE — G0545: CPT

## 2025-01-15 RX ORDER — DIPHENHYDRAMINE HCL 25 MG
25 CAPSULE ORAL ONCE
Refills: 0 | Status: COMPLETED | OUTPATIENT
Start: 2025-01-15 | End: 2025-01-15

## 2025-01-15 RX ORDER — SODIUM CHLORIDE 9 G/ML
1000 INJECTION, SOLUTION INTRAVENOUS
Refills: 0 | Status: DISCONTINUED | OUTPATIENT
Start: 2025-01-15 | End: 2025-01-16

## 2025-01-15 RX ORDER — HYDROMORPHONE HYDROCHLORIDE 4 MG/ML
1 INJECTION, SOLUTION INTRAMUSCULAR; INTRAVENOUS; SUBCUTANEOUS ONCE
Refills: 0 | Status: DISCONTINUED | OUTPATIENT
Start: 2025-01-15 | End: 2025-01-15

## 2025-01-15 RX ORDER — HYDROMORPHONE HYDROCHLORIDE 4 MG/ML
30 INJECTION, SOLUTION INTRAMUSCULAR; INTRAVENOUS; SUBCUTANEOUS
Refills: 0 | Status: DISCONTINUED | OUTPATIENT
Start: 2025-01-15 | End: 2025-01-18

## 2025-01-15 RX ADMIN — Medication 1 APPLICATION(S): at 06:10

## 2025-01-15 RX ADMIN — HYDROMORPHONE HYDROCHLORIDE 1 MILLIGRAM(S): 4 INJECTION, SOLUTION INTRAMUSCULAR; INTRAVENOUS; SUBCUTANEOUS at 12:30

## 2025-01-15 RX ADMIN — NAFCILLIN INJECTION 200 GRAM(S): 2 POWDER, FOR SOLUTION INTRAMUSCULAR; INTRAMUSCULAR; INTRAVENOUS at 14:58

## 2025-01-15 RX ADMIN — Medication 1: at 08:53

## 2025-01-15 RX ADMIN — Medication 1.25 MILLIGRAM(S): at 08:51

## 2025-01-15 RX ADMIN — HYDROMORPHONE HYDROCHLORIDE 1 MILLIGRAM(S): 4 INJECTION, SOLUTION INTRAMUSCULAR; INTRAVENOUS; SUBCUTANEOUS at 11:38

## 2025-01-15 RX ADMIN — NAFCILLIN INJECTION 200 GRAM(S): 2 POWDER, FOR SOLUTION INTRAMUSCULAR; INTRAMUSCULAR; INTRAVENOUS at 05:54

## 2025-01-15 RX ADMIN — HYDROMORPHONE HYDROCHLORIDE 30 MILLILITER(S): 4 INJECTION, SOLUTION INTRAMUSCULAR; INTRAVENOUS; SUBCUTANEOUS at 11:11

## 2025-01-15 RX ADMIN — Medication 100 MILLIGRAM(S): at 21:13

## 2025-01-15 RX ADMIN — HYDROMORPHONE HYDROCHLORIDE 30 MILLILITER(S): 4 INJECTION, SOLUTION INTRAMUSCULAR; INTRAVENOUS; SUBCUTANEOUS at 19:07

## 2025-01-15 RX ADMIN — NAFCILLIN INJECTION 200 GRAM(S): 2 POWDER, FOR SOLUTION INTRAMUSCULAR; INTRAMUSCULAR; INTRAVENOUS at 02:42

## 2025-01-15 RX ADMIN — Medication 4 MILLILITER(S): at 08:51

## 2025-01-15 RX ADMIN — ACETAMINOPHEN, DIPHENHYDRAMINE HCL, PHENYLEPHRINE HCL 3 MILLIGRAM(S): 325; 25; 5 TABLET ORAL at 21:13

## 2025-01-15 RX ADMIN — PREGABALIN CAPSULES, CV 200 MILLIGRAM(S): 225 CAPSULE ORAL at 14:57

## 2025-01-15 RX ADMIN — Medication 4 MILLILITER(S): at 21:46

## 2025-01-15 RX ADMIN — METHADONE HYDROCHLORIDE 5 MILLIGRAM(S): 5 SOLUTION ORAL at 14:58

## 2025-01-15 RX ADMIN — PREGABALIN CAPSULES, CV 200 MILLIGRAM(S): 225 CAPSULE ORAL at 05:49

## 2025-01-15 RX ADMIN — NAFCILLIN INJECTION 200 GRAM(S): 2 POWDER, FOR SOLUTION INTRAMUSCULAR; INTRAMUSCULAR; INTRAVENOUS at 21:13

## 2025-01-15 RX ADMIN — FENTANYL CITRATE 30 MILLILITER(S): 50 INJECTION INTRAMUSCULAR; INTRAVENOUS at 06:28

## 2025-01-15 RX ADMIN — SODIUM CHLORIDE 100 MILLILITER(S): 9 INJECTION, SOLUTION INTRAVENOUS at 16:33

## 2025-01-15 RX ADMIN — ANTISEPTIC SURGICAL SCRUB 1 APPLICATION(S): 0.04 SOLUTION TOPICAL at 10:43

## 2025-01-15 RX ADMIN — OCTREOTIDE ACETATE 100 MICROGRAM(S): 1000 INJECTION INTRAVENOUS; SUBCUTANEOUS at 05:49

## 2025-01-15 RX ADMIN — Medication 1 APPLICATION(S): at 16:33

## 2025-01-15 RX ADMIN — FENTANYL CITRATE 30 MILLILITER(S): 50 INJECTION INTRAMUSCULAR; INTRAVENOUS at 07:17

## 2025-01-15 RX ADMIN — METHADONE HYDROCHLORIDE 5 MILLIGRAM(S): 5 SOLUTION ORAL at 05:50

## 2025-01-15 RX ADMIN — PANTOPRAZOLE 40 MILLIGRAM(S): 20 TABLET, DELAYED RELEASE ORAL at 16:32

## 2025-01-15 RX ADMIN — NAFCILLIN INJECTION 200 GRAM(S): 2 POWDER, FOR SOLUTION INTRAMUSCULAR; INTRAMUSCULAR; INTRAVENOUS at 08:54

## 2025-01-15 RX ADMIN — PREGABALIN CAPSULES, CV 200 MILLIGRAM(S): 225 CAPSULE ORAL at 21:12

## 2025-01-15 RX ADMIN — NAFCILLIN INJECTION 200 GRAM(S): 2 POWDER, FOR SOLUTION INTRAMUSCULAR; INTRAMUSCULAR; INTRAVENOUS at 16:32

## 2025-01-15 RX ADMIN — Medication 25 MILLIGRAM(S): at 21:46

## 2025-01-15 RX ADMIN — DEXTROMETHORPHAN HBR AND GUAIFENESIN ORAL SOLUTION 10 MILLILITER(S): 10; 100 LIQUID ORAL at 21:12

## 2025-01-15 RX ADMIN — PREDNISONE 40 MILLIGRAM(S): 5 TABLET ORAL at 05:50

## 2025-01-15 RX ADMIN — Medication 1.25 MILLIGRAM(S): at 21:46

## 2025-01-15 RX ADMIN — PANTOPRAZOLE 40 MILLIGRAM(S): 20 TABLET, DELAYED RELEASE ORAL at 05:49

## 2025-01-15 RX ADMIN — ONDANSETRON 4 MILLIGRAM(S): 4 TABLET, ORALLY DISINTEGRATING ORAL at 10:43

## 2025-01-15 RX ADMIN — OCTREOTIDE ACETATE 100 MICROGRAM(S): 1000 INJECTION INTRAVENOUS; SUBCUTANEOUS at 16:33

## 2025-01-15 NOTE — PROGRESS NOTE ADULT - SUBJECTIVE AND OBJECTIVE BOX
39y  Female with h/o metastatic breast cancer ER/LA Neg, HER-2 + on chemotherapy (last dose 12/26/24) (mets to lung, liver, spleen, spine, bone and brain), gastritis w/ intermittent episodes of dark stool (follows w/ Dr. Rosado GI and is on PPI and octreotide daily). Patient presented 1/5 with c/o worsening SOB.  She was seen in ED 1/2/25 for for weakness and SOB at which time she was found to have Hb of 5 requiring PRBC transfusion and positive for Flu A and started on tamiflu and was discharged from the ED on 1/3/25.  Her respiratory status has worsened since then w/ productive cough associated with body aches and chills.  Denies sick contacts but has had many frequent hospital visits. Patient has been afebrile, no leukocytosis. Was placed on BIPAP for Worsening respiratory status. continued on Tamiflu. Vancomycn and Zosyn was added. Blood cultures with MSSA.     PAST MEDICAL & SURGICAL HISTORY:  H/O compression fracture of spine  Anxiety  Metastatic breast cancer  H/O pleural effusion  Pericardial effusion  S/P tonsillectomy  H/O chest tube placement  12/23/21  S/P pericardiocentesis  12/28/21    Allergies  pertuzumab (Other (Severe))  Perjeta (Other (Severe))    MEDICATIONS  (STANDING):  acetylcysteine 10%  Inhalation 4 milliLiter(s) Inhalation every 6 hours  albuterol/ipratropium for Nebulization. 3 milliLiter(s) Nebulizer once  chlorhexidine 2% Cloths 1 Application(s) Topical daily  dextrose 5%. 1000 milliLiter(s) (50 mL/Hr) IV Continuous <Continuous>  dextrose 5%. 1000 milliLiter(s) (100 mL/Hr) IV Continuous <Continuous>  dextrose 50% Injectable 25 Gram(s) IV Push once  dextrose 50% Injectable 12.5 Gram(s) IV Push once  dextrose 50% Injectable 25 Gram(s) IV Push once  furosemide   Injectable 40 milliGRAM(s) IV Push daily  glucagon  Injectable 1 milliGRAM(s) IntraMuscular once  HYDROmorphone PCA (1 mG/mL) 30 milliLiter(s) PCA Continuous PCA Continuous  insulin lispro (ADMELOG) corrective regimen sliding scale   SubCutaneous three times a day before meals  levalbuterol Inhalation 1.25 milliGRAM(s) Inhalation every 6 hours  methadone    Tablet 10 milliGRAM(s) Oral at bedtime  methadone    Tablet 5 milliGRAM(s) Oral <User Schedule>  methylPREDNISolone sodium succinate Injectable 40 milliGRAM(s) IV Push every 24 hours  nafcillin  IVPB 2 Gram(s) IV Intermittent every 4 hours  octreotide  Injectable 100 MICROGram(s) SubCutaneous two times a day  pantoprazole  Injectable 40 milliGRAM(s) IV Push every 12 hours  pregabalin 200 milliGRAM(s) Oral every 8 hours    MEDICATIONS  (PRN):  acetaminophen     Tablet .. 650 milliGRAM(s) Oral every 6 hours PRN Temp greater or equal to 38C (100.4F), Mild Pain (1 - 3)  aluminum hydroxide/magnesium hydroxide/simethicone Suspension 30 milliLiter(s) Oral every 4 hours PRN Dyspepsia  benzonatate 100 milliGRAM(s) Oral every 8 hours PRN Cough  dextrose Oral Gel 15 Gram(s) Oral once PRN Blood Glucose LESS THAN 70 milliGRAM(s)/deciliter  guaifenesin/dextromethorphan Oral Liquid 10 milliLiter(s) Oral every 4 hours PRN Cough  melatonin 3 milliGRAM(s) Oral at bedtime PRN Insomnia  naloxone Injectable 0.1 milliGRAM(s) IV Push every 3 minutes PRN For ANY of the following changes in patient status:  A. RR LESS THAN 10 breaths per minute, B. Oxygen saturation LESS THAN 90%, C. Sedation score of 6  ondansetron Injectable 4 milliGRAM(s) IV Push every 8 hours PRN Nausea and/or Vomiting    Vital Signs Last 24 Hrs  T(C): 36.8 (15 Cristhian 2025 07:38), Max: 37.1 (15 Cristhian 2025 04:02)  T(F): 98.3 (15 Cristhian 2025 07:38), Max: 98.7 (15 Cristhian 2025 04:02)  HR: 88 (15 Cristhian 2025 09:09) (74 - 104)  BP: 111/64 (15 Cristhian 2025 08:00) (96/55 - 114/69)  BP(mean): 77 (15 Cristhian 2025 08:00) (69 - 82)  RR: 12 (15 Cristhian 2025 08:00) (12 - 18)  SpO2: 95% (15 Cristhian 2025 08:00) (91% - 100%)    Parameters below as of 15 Cristhian 2025 08:00  Patient On (Oxygen Delivery Method): nasal cannula  O2 Flow (L/min): 3    PE:  NAD  On O2 NC  bilateral rales  abd soft, nd, nt  a/o x 3      CBC                          7.9    10.16 )-----------( 123      ( 15 Cristhian 2025 06:10 )             25.4                           6.4    8.82  )-----------( 92       ( 14 Jan 2025 07:31 )             19.9                             6.7    10.58 )-----------( 94       ( 13 Jan 2025 06:10 )             20.8         Chem:       01-15    138  |  102  |  12.3  ----------------------------<  135[H]  3.6   |  25.0  |  0.67    Ca    8.1[L]      15 Cristhian 2025 06:10      01-14    141  |  103  |  13.4  ----------------------------<  127[H]  3.4[L]   |  30.0[H]  |  0.54    Ca    8.1[L]      14 Jan 2025 04:48        01-13    141  |  103  |  20.3[H]  ----------------------------<  137[H]  3.8   |  27.0  |  0.47[L]    Ca    8.2[L]      13 Jan 2025 06:10        01-09    138  |  100  |  21.3[H]  ----------------------------<  152[H]  3.5   |  29.0  |  0.54    Ca    7.0[L]      09 Jan 2025 04:57  Mg     1.9     01-08    TPro  5.0[L]  /  Alb  2.5[L]  /  TBili  1.8  /  DBili  1.3[H]  /  AST  24  /  ALT  23  /  AlkPhos  238[H]  01-09 01-08    140  |  103  |  17.8  ----------------------------<  112[H]  3.5   |  27.0  |  0.56    Ca    6.8[L]      08 Jan 2025 06:20  Mg     1.9     01-08    TPro  5.4[L]  /  Alb  2.5[L]  /  TBili  1.9  /  DBili  1.3[H]  /  AST  24  /  ALT  25  /  AlkPhos  240[H]  01-08          ( 01-07-25 @ 06:15 )    139  |  106  |  17.5  ----------------------------<  394[H]  3.3[L]   |  20.0[L]  |  0.61    Liver Functions: ( 01-07-25 @ 06:15 )  Alb: 2.6 g/dL / Pro: 5.5 g/dL / ALK PHOS: 228 U/L / ALT: 22 U/L / AST: 16 U/L / GGT: x

## 2025-01-15 NOTE — PROGRESS NOTE ADULT - ASSESSMENT
39y  Female with h/o metastatic breast cancer ER/MT Neg, HER-2 + on chemotherapy (last dose 12/26/24) (mets to lung, liver, spleen, spine, bone and brain), gastritis w/ intermittent episodes of dark stool (follows w/ Dr. Rosado GI and is on PPI and octreotide daily). Patient presented 1/5 with c/o worsening SOB.  She was seen in ED 1/2/25 for for weakness and SOB at which time she was found to have Hb of 5 requiring PRBC transfusion and positive for Flu A and started on tamiflu and was discharged from the ED on 1/3/25.  Her respiratory status has worsened since then w/ productive cough associated with body aches and chills. Admitted for sepsis ,acute hypoxic respiratory failure with flu A. Currently on HFNC for worsening respiratory status.S/P  Tamiflu. Vancomycn and Zosyn was added. Blood cultures with MSSA.  Cardiology consultation requested for evaluation of endocarditis.   Blood cultures 1/5, 1/7, 1/9 reporting MSSA , Repeat blood cultures ngtd on 1/11/25, Sputum Cx 1/6 Staphylococcus aureus. MARYANN hold for active gib requring transfusion. Pt had large BM likley diverticular bleed. Had recent scopy. Gi following. ct abd/pelvis w/iv contrast no active bleed (1/13). Urine Cx 1/5 reporting 10k - 49k Escherichia coli  of ? significance  since UA not concerning for UTI.  RVP/COVID 19 PCR + Influenza A.      Acute blood loss anemia   GIB  -hb 7.9.last transfuse 1u prbc 1/14  -s/p multiple transfusion   -CTA abd/pelvis  no active bleed   -PPI/OCTRIO  -GI eval with no plan for scopes 2/2 recently done.  Still with bleeding.  reed GI history, ? GAVE disease per GI notes and prior EGD . H as had multiple EGD/colonoscopies in past as well.   -hem/onc following      Thrombocytopenia  likely from chemo/sepsis  -will monitor cbc        Sepsis 2/2 Flu A w/ superimposed multifocal PNA  Acute hypoxic respiratory failure 2/2 Flu A w/ superimposed multifocal PNA   - on NC, comfortable  - persistently positive RVP for flu A  - s/p  tamiflu through 1/10  - repeat CT 1/8 shows improved pleural effusion but worsening GGOs, unclear if infectious or malignant etiology  - repeat CT 1/9 with similar findings  - repeat CT next week to evaluate for improvement of infiltrates  - s/p  IV lasix 40mg qd x 2 days given recent blood transfusion  - po steroid tapering dose  - c/w abx per ID recs  - sputum clx growing moderate staph aureus   - TTE EF 65-70%, no WMA    MSSA bacteremia  - blood clx positive 1/5, repeat bl c/s (1/11) ngtd  - ID following  - c/w nafcillin  per id  - MARYANN on hold for gib. 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   Thrombocytopenia likely 2/2 sepsis   Breast cancer ER/MT Neg, HER-2 pos on chemotherapy w/ mets to lung, liver, spleen, spine, bone and brain  - Baseline Hb ranges 8-9    - s/p prbc transfusion on 1/2, 1/8 and 1/9,1/11,1/13,1/14  - trend CBC, transfuse for hgb <7  - c/w home sub q octreotide and IV protonix  - Monitor CBC and transfuse for Hb<7 and plts<20K in setting of sepsis   - [facility] following  - monitor on tele and     Breast cancer ER/MT Neg, HER-2 pos on chemotherapy w/ mets to lung, liver, spleen, spine, bone and brain  - c/w pregabalin, methylphenidate (confirmed on ISTOP Reference #: [id number])  - Reports being on methadone for pain but last 30 day script on istop dispensed 09/19/24  - Is on po dilaudid 4mg prn w/ last script dispensed 12/04/24   - dc fentanyl drip, started dilaudid pca today. methadon  - pain management following, revert back to PO Dilaudid regimen on d/c .will f/u rec    dvt ppx scd'd  disposition: pending. improvement of bloody bm/anemia, pain control.  dw pt

## 2025-01-15 NOTE — PROGRESS NOTE ADULT - SUBJECTIVE AND OBJECTIVE BOX
[facility]  INFECTIOUS DISEASES at [place] / [place] / [place]  =======================================================                              Salazar Toro MD                              Professor Emeritus:  Dr Warner Mcintosh MD            Diplomates American Board of Internal Medicine & Infectious Diseases                                   Tel  [phone] Fax [phone]                                  Hospital Consult line:  [phone]  =======================================================      [place] [id number]    Follow up: MSSA bacteremia    No fevers       Allergies:  pertuzumab (Other (Severe))  Perjeta (Other (Severe))        REVIEW OF SYSTEMS:  CONSTITUTIONAL:  No Fever or chills  HEENT:   No diplopia or blurred vision.  No earache, sore throat or runny nose.  CARDIOVASCULAR:  No Chest Pain  RESPIRATORY:  No cough, shortness of breath  GASTROINTESTINAL:  No nausea, vomiting or diarrhea.  GENITOURINARY:  No dysuria, frequency or urgency. No Blood in urine  MUSCULOSKELETAL:  no joint aches, no muscle pain  SKIN:  No change in skin, hair or nails.  NEUROLOGIC:  No Headaches      Physical Exam:  GEN: NAD  HEENT: normocephalic and atraumatic.    NECK: Supple.   LUNGS: CTA B/L.  HEART: RRR  ABDOMEN: Soft, NT, ND.  +BS.    : No CVA tenderness  EXTREMITIES: Without  edema.  MSK: No joint swelling  NEUROLOGIC: No Focal Deficits   SKIN: No rash      Vitals:  T(F): 98.3 (15 Cristhian 2025 07:38), Max: 98.7 (15 Cristhian 2025 04:02)  HR: 118 (15 Cristhian 2025 10:00)  BP: 117/67 (15 Cristhian 2025 10:00)  RR: 12 (15 Cristhian 2025 10:00)  SpO2: 96% (15 Cristhian 2025 10:00) (91% - 100%)  temp max in last 48H T(F): , Max: 98.7 (01-15-25 @ 04:02)    Current Antibiotics:  nafcillin  IVPB 2 Gram(s) IV Intermittent every 4 hours    Other medications:  acetylcysteine 10%  Inhalation 4 milliLiter(s) Inhalation every 6 hours  chlorhexidine 2% Cloths 1 Application(s) Topical daily  dextrose 5%. 1000 milliLiter(s) IV Continuous <Continuous>  dextrose 5%. 1000 milliLiter(s) IV Continuous <Continuous>  dextrose 50% Injectable 25 Gram(s) IV Push once  dextrose 50% Injectable 12.5 Gram(s) IV Push once  dextrose 50% Injectable 25 Gram(s) IV Push once  glucagon  Injectable 1 milliGRAM(s) IntraMuscular once  hydrocortisone 2.5% Rectal Cream 1 Application(s) Rectal two times a day  HYDROmorphone PCA (1 mG/mL) 30 milliLiter(s) PCA Continuous PCA Continuous  insulin lispro (ADMELOG) corrective regimen sliding scale   SubCutaneous three times a day before meals  lactulose Syrup 10 Gram(s) Oral daily  levalbuterol Inhalation 1.25 milliGRAM(s) Inhalation every 6 hours  methadone    Tablet 5 milliGRAM(s) Oral <User Schedule>  octreotide  Injectable 100 MICROGram(s) SubCutaneous two times a day  pantoprazole  Injectable 40 milliGRAM(s) IV Push every 12 hours  predniSONE   Tablet 40 milliGRAM(s) Oral daily  pregabalin 200 milliGRAM(s) Oral every 8 hours                            7.9    10.16 )-----------( 123      ( 15 Cristhian 2025 06:10 )             25.4     01-15    138  |  102  |  12.3  ----------------------------<  135[H]  3.6   |  25.0  |  0.67    Ca    8.1[L]      15 Cristhian 2025 06:10      RECENT CULTURES:  01-11 @ 05:25 .Blood BLOOD     No growth at 4 days    01-11 @ 05:20 .Blood BLOOD     No growth at 4 days    01-10 @ 18:20 .Surgical Swab     No growth to date.    01-10 @ 12:20 .Blood BLOOD     No growth at 4 days    01-09 @ 04:57 .Blood BLOOD Staphylococcus aureus    Growth in aerobic bottle: Staphylococcus aureus  Growth in aerobic bottle: Gram Positive Cocci in Clusters    01-08 @ 06:20 .Blood BLOOD     Growth in aerobic and anaerobic bottles: Staphylococcus aureus  See previous culture [id number]  Growth in anaerobic bottle: Gram Positive Cocci in Clusters  Growth in aerobic bottle: Gram Positive Cocci in Clusters    01-07 @ 06:19 .Blood BLOOD     Growth in aerobic and anaerobic bottles: Staphylococcus aureus  See previous culture [id number]  Growth in aerobic bottle: Gram Positive Cocci in Clusters  Growth in anaerobic bottle: Gram Positive Cocci in Clusters    01-07 @ 06:15 .Blood BLOOD     Growth in aerobic and anaerobic bottles: Staphylococcus aureus  See previous culture [id number]  Growth in aerobic bottle: Gram Positive Cocci in Clusters  Growth in anaerobic bottle: Gram Positive Cocci in Clusters    01-06 @ 01:34 .Sputum Sputum Staphylococcus aureus    Moderate Staphylococcus aureus  Commensal sylvester consistent with body site  Moderate Squamous epithelial cells seen per low power field  Moderate polymorphonuclear leukocytes seen per low power field  Few Gram Negative Rods seen per oil power field  Few Gram positive cocci in pairs seen per oil power field  Rare Gram Positive Rods seen per oil power field  Rare Yeast like cells seen per oil power field    01-05 @ 12:34 Clean Catch Clean Catch (Midstream) Escherichia coli    10,000 - 49,000 CFU/mL Escherichia coli  <10,000 CFU/mL Normal Urogenital Sylvester    01-05 @ 10:54 .Blood BLOOD Blood Culture PCR  Staphylococcus aureus    Growth in aerobic and anaerobic bottles: Staphylococcus aureus  Direct identification is available within approximately 3-5  hours either by Blood Panel Multiplexed PCR or Direct  MALDI-TOF. Details: [email or web address]  Growth in anaerobic bottle: Gram Positive Cocci in Clusters  Growth in aerobic bottle: Gram Positive Cocci in Clusters      WBC Count: 10.16 K/uL (01-15-25 @ 06:10)  WBC Count: 10.83 K/uL (01-14-25 @ 20:28)  WBC Count: 8.82 K/uL (01-14-25 @ 07:31)  WBC Count: 8.77 K/uL (01-14-25 @ 04:48)  WBC Count: 16.34 K/uL (01-13-25 @ 16:31)  WBC Count: 10.58 K/uL (01-13-25 @ 06:10)  WBC Count: 12.54 K/uL (01-12-25 @ 09:00)  WBC Count: 11.54 K/uL (01-12-25 @ 01:36)  WBC Count: 10.18 K/uL (01-11-25 @ 21:00)  WBC Count: 6.78 K/uL (01-11-25 @ 05:20)  WBC Count: 18.73 K/uL (01-10-25 @ 12:20)    Creatinine: 0.67 mg/dL (01-15-25 @ 06:10)  Creatinine: 0.54 mg/dL (01-14-25 @ 04:48)  Creatinine: 0.47 mg/dL (01-13-25 @ 06:10)  Creatinine: 0.53 mg/dL (01-12-25 @ 09:00)  Creatinine: 0.47 mg/dL (01-11-25 @ 05:20)    Procalcitonin: 1.37 ng/mL (01-08-25 @ 06:20)  Procalcitonin: 2.32 ng/mL (01-07-25 @ 06:15)     SARS-CoV-2: NotDetec (01-05-25 @ 10:54)  SARS-CoV-2 Result: NotDetec (01-02-25 @ 19:20)          Influenza AH3 (RapRVP): Detected (01.05.25 @ 10:54)    Urinalysis + Microscopic Examination (01.06.25 @ 03:00)    pH Urine: 6.0   Urine Appearance: Clear   Color: Yellow   Specific Gravity: 1.015   Protein, Urine: Trace mg/dL   Glucose Qualitative, Urine: Negative mg/dL   Ketone - Urine: Negative mg/dL   Blood, Urine: Small   Bilirubin: Negative   Urobilinogen: 1.0 mg/dL   Leukocyte Esterase Concentration: Small   Nitrite: Negative   White Blood Cell - Urine: 6 /HPF   Red Blood Cell - Urine: 5 /HPF   Bacteria: Occasional /HPF   Epithelial Cells: 2 /HPF          < from: TTE Limited W or WO Ultrasound Enhancing Agent (01.06.25 @ 12:55) >  CONCLUSIONS:      1. Technically difficult image quality.   2. Left ventricular systolic function is normal with an ejection fraction visually estimated at 65 to 70 %.   3. Normal right ventricular cavity size, with normal wall thickness, and normal right ventricular systolic function.   4. Compared to the transthoracic echocardiogram performed on 7/21/2024, there have been no significant interval changes.    < end of copied text >

## 2025-01-15 NOTE — PROGRESS NOTE ADULT - ASSESSMENT
39y  Female with h/o metastatic breast cancer ER/[place] Neg, HER-2 + on chemotherapy (last dose 12/26/24) (mets to lung, liver, spleen, spine, bone and brain), gastritis w/ intermittent episodes of dark stool (follows w/ Dr. Rosado GI and is on PPI and octreotide daily). Patient presented 1/5 with c/o worsening SOB.  She was seen in ED 1/2/25 for for weakness and SOB at which time she was found to have Hb of 5 requiring PRBC transfusion and positive for Flu A and started on tamiflu and was discharged from the ED on 1/3/25.  Her respiratory status has worsened since then w/ productive cough associated with body aches and chills.  Denies sick contacts but has had many frequent hospital visits. Patient has been afebrile, no leukocytosis. Was placed on BIPAP for Worsening respiratory status. continued on Tamiflu. Vancomycn and Zosyn was added. Blood cultures with MSSA.     Metastatic breast cancer   - last dose of trastuzumab was on 12/26/24.  -All treatment on hold at the moment.  -Follow up with primary oncologist, Dr. Chapa after discharge    Cytopenias   - secondary to malignancy and acute illness and now GIB  - Plt 123k  -  Hgb 6.4 yesterday S/P 1 U PRBC hgb went up to 8.7 now 7.0 this AM  - GI evaluated for large bloody bowel movement  Suspect rectal bleeding is due to diverticular bleed which are self-limiting - If patient becomes hemodynamically unstable, requiring multiple transfusions,   - CT A/P No evidence of active intraluminal extravasation of contrast.   - GI f/u noted.  no plans for scope.  Previously scoped in Nov. 2024. internal hemorrhoids, gastric erosions, no active bleeding site   - c/w home sub q octreotide and IV protonix  -Transfuse if hgb<7.0.      Respiratory failure   - multifocal pneumonia and flu+  - oseltamivir through 1/10  - Management as per primary team.  - TTE 1/6 with no veg  - Cardiology following- . No MARYANN at this time until stabilizes and source of bleed ascertained    MSSA Bacteremia  - cultures persistently positive. pt afebrile.   --  ID following -On nafcillin   - Would remove Port given persistent Bacteremia   -  Agree with port removal and placement of PICC or midline prior to d/c for outpt chemo     Cont aggressive pain management. Remains on PCA pump.    Will follow

## 2025-01-15 NOTE — PROGRESS NOTE ADULT - ASSESSMENT
39y  Female with h/o metastatic breast cancer ER/MI Neg, HER-2 + on chemotherapy (last dose 12/26/24) (mets to lung, liver, spleen, spine, bone and brain), gastritis w/ intermittent episodes of dark stool (follows w/ Dr. Rosado GI and is on PPI and octreotide daily). Patient presented 1/5 with c/o worsening SOB.  She was seen in ED 1/2/25 for for weakness and SOB at which time she was found to have Hb of 5 requiring PRBC transfusion and positive for Flu A and started on tamiflu and was discharged from the ED on 1/3/25.  Her respiratory status has worsened since then w/ productive cough associated with body aches and chills.  Denies sick contacts but has had many frequent hospital visits. Patient has been afebrile, no leukocytosis. Was placed on BIPAP for Worsening respiratory status. continued on Tamiflu. Vancomycn and Zosyn was added. Blood cultures with MSSA. ID input requested.       MSSA bacteremia   Staphylococcus aureus PNA   Influenza A   metastatic breast cancer ER/MI Neg, HER-2 + on chemotherapy   Port in place s/p explant 1/10/25      - Blood cultures 1/5, 1/7, 1/9 reporting MSSA    - Repeat blood cultures 1/10 and 1/11/25 no growth   - Sputum Cx 1/6 Staphylococcus aureus  - Urine Cx 1/5 reporting 10k - 49k Escherichia coli  of ? significance since UA not concerning for UTI   - RVP/COVID 19 PCR + Influenza A  - S/P Port removal 1/10/25  - CTA Chest reporting No acute pulmonary embolism. Wide spread patchy airspace opacities bilaterally, increased since the prior exam.  - US RUQ reporting No evidence of acute cholecystitis or biliary ductal dilatation.  - Procalcitonin level 2.32 --> 1.37  - TTE 1/6 with no veg  - Called cardiology consult for MARYANN if possible   - GI eval noted, no plan for EGD   - s/p Port removal 1/10/25  - Completed oseltamivir  1/10/25  - Continue Nafcillin 2gm IV [id number]  - Hold off on PICC/Midline for now unless needed for reasons other than infectious diseases  - Follow up cultures  - Trend Fever  - Trend WBC      Thank you for allowing me to participate in the care of your patient.   Will Follow    Discussed treatment plan with:  Jessica SEQUEIRA and Clinical pharmacy

## 2025-01-15 NOTE — PROGRESS NOTE ADULT - ASSESSMENT
38 yo F with h/o metastatic breast cancer ER/AK Neg, HER-2 + on chemotherapy (last dose 12/26/24) (mets to lung, liver, spleen, spine, bone and brain).     Pain management following for pain control.    Recommendations:  - Start Fentanyl PCA  0/20/6/600  - Continue home Methadone 5mg po bid at 0600 and 1400  - Continue home Methadone 10mg po qHS  - Continue home Lyrica 200mg po q8h    When due for discharge:   - DC Fentanyl PCA   - Recommend starting Dilaudid PO 4mg/8mg [id number] PRN mod/severe pain        Will continue to follow. Please call pain management with any questions [phone] 40 yo F with h/o metastatic breast cancer ER/[place] Neg, HER-2 + on chemotherapy (last dose 12/26/24) (mets to lung, liver, spleen, spine, bone and brain).     Pain management following for pain control.    Recommendations:   - DC Fentanyl PCA    - Start Dilaudid PCA 0/0.35/8/6   - Continue home Methadone 5mg po bid at 0600 and 1400   - Continue home Methadone 10mg po qHS   - Continue home Lyrica 200mg po q8h    When due for discharge:   - DC Dilaudid PCA   - Recommend starting Dilaudid PO 4mg/8mg [id number] PRN mod/severe pain          Will continue to follow. Please call pain management with any questions [phone]

## 2025-01-15 NOTE — PROGRESS NOTE ADULT - SUBJECTIVE AND OBJECTIVE BOX
Interval Hx:  Patient seen during rounds  Patient reports pain to be controlled on current medications  Patient denies sedation with medications      Analgesic Dosing for past 24 hours reviewed as below:    melatonin   3 milliGRAM(s) Oral (01-14-25 @ 21:08)    methadone    Tablet   10 milliGRAM(s) Oral (01-14-25 @ 21:08)    methadone    Tablet   5 milliGRAM(s) Oral (01-15-25 @ 05:50)   5 milliGRAM(s) Oral (01-14-25 @ 13:45)    ondansetron Injectable   4 milliGRAM(s) IV Push (01-14-25 @ 18:34)   4 milliGRAM(s) IV Push (01-14-25 @ 11:19)    pregabalin   200 milliGRAM(s) Oral (01-15-25 @ 05:49)   200 milliGRAM(s) Oral (01-14-25 @ 21:40)          T(C): 36.8 (01-15-25 @ 07:38), Max: 37.1 (01-15-25 @ 04:02)  HR: 104 (01-15-25 @ 06:00) (74 - 104)  BP: 114/69 (01-15-25 @ 06:00) (96/55 - 114/69)  RR: 12 (01-15-25 @ 06:00) (12 - 19)  SpO2: 98% (01-15-25 @ 06:00) (91% - 100%)      01-14-25 @ 07:01  -  01-15-25 @ 07:00  --------------------------------------------------------  IN: 1508 mL / OUT: 378 mL / NET: 1130 mL        acetaminophen     Tablet .. 650 milliGRAM(s) Oral every 6 hours PRN  acetylcysteine 10%  Inhalation 4 milliLiter(s) Inhalation every 6 hours  aluminum hydroxide/magnesium hydroxide/simethicone Suspension 30 milliLiter(s) Oral every 4 hours PRN  benzonatate 100 milliGRAM(s) Oral every 8 hours PRN  chlorhexidine 2% Cloths 1 Application(s) Topical daily  dextrose 5%. 1000 milliLiter(s) IV Continuous <Continuous>  dextrose 5%. 1000 milliLiter(s) IV Continuous <Continuous>  dextrose 50% Injectable 25 Gram(s) IV Push once  dextrose 50% Injectable 12.5 Gram(s) IV Push once  dextrose 50% Injectable 25 Gram(s) IV Push once  dextrose Oral Gel 15 Gram(s) Oral once PRN  fentaNYL PCA (50 MICROgram(s)/mL) 30 milliLiter(s) PCA Continuous PCA Continuous  glucagon  Injectable 1 milliGRAM(s) IntraMuscular once  guaifenesin/dextromethorphan Oral Liquid 10 milliLiter(s) Oral every 4 hours PRN  hydrocortisone 2.5% Rectal Cream 1 Application(s) Rectal two times a day  insulin lispro (ADMELOG) corrective regimen sliding scale   SubCutaneous three times a day before meals  lactulose Syrup 10 Gram(s) Oral daily  levalbuterol Inhalation 1.25 milliGRAM(s) Inhalation every 6 hours  melatonin 3 milliGRAM(s) Oral at bedtime PRN  methadone    Tablet 5 milliGRAM(s) Oral <User Schedule>  nafcillin  IVPB 2 Gram(s) IV Intermittent every 4 hours  naloxone Injectable 0.1 milliGRAM(s) IV Push every 3 minutes PRN  octreotide  Injectable 100 MICROGram(s) SubCutaneous two times a day  ondansetron Injectable 4 milliGRAM(s) IV Push every 8 hours PRN  pantoprazole  Injectable 40 milliGRAM(s) IV Push every 12 hours  predniSONE   Tablet 40 milliGRAM(s) Oral daily  pregabalin 200 milliGRAM(s) Oral every 8 hours                          7.9    10.16 )-----------( 123      ( 15 Cristhian 2025 06:10 )             25.4     01-15    138  |  102  |  12.3  ----------------------------<  135[H]  3.6   |  25.0  |  0.67    Ca    8.1[L]      15 Cristhian 2025 06:10        Urinalysis Basic - ( 15 Cristhian 2025 06:10 )    Color: x / Appearance: x / SG: x / pH: x  Gluc: 135 mg/dL / Ketone: x  / Bili: x / Urobili: x   Blood: x / Protein: x / Nitrite: x   Leuk Esterase: x / RBC: x / WBC x   Sq Epi: x / Non Sq Epi: x / Bacteria: x        Pain Service   [phone]

## 2025-01-15 NOTE — PROGRESS NOTE ADULT - SUBJECTIVE AND OBJECTIVE BOX
Patient is a 39y old  Female who presents with a chief complaint of sepsis (15 Cristhian 2025 10:17)      c/o fatigue. c/o gen body pain  denies fever,chill,n/v. has one bm  REVIEW OF SYSTEMS: All systems are reviewed and found to be negative except above    MEDICATIONS  (STANDING):  acetylcysteine 10%  Inhalation 4 milliLiter(s) Inhalation every 6 hours  chlorhexidine 2% Cloths 1 Application(s) Topical daily  dextrose 5%. 1000 milliLiter(s) (50 mL/Hr) IV Continuous <Continuous>  dextrose 5%. 1000 milliLiter(s) (100 mL/Hr) IV Continuous <Continuous>  dextrose 50% Injectable 25 Gram(s) IV Push once  dextrose 50% Injectable 12.5 Gram(s) IV Push once  dextrose 50% Injectable 25 Gram(s) IV Push once  glucagon  Injectable 1 milliGRAM(s) IntraMuscular once  hydrocortisone 2.5% Rectal Cream 1 Application(s) Rectal two times a day  HYDROmorphone PCA (1 mG/mL) 30 milliLiter(s) PCA Continuous PCA Continuous  insulin lispro (ADMELOG) corrective regimen sliding scale   SubCutaneous three times a day before meals  lactulose Syrup 10 Gram(s) Oral daily  levalbuterol Inhalation 1.25 milliGRAM(s) Inhalation every 6 hours  methadone    Tablet 5 milliGRAM(s) Oral <User Schedule>  nafcillin  IVPB 2 Gram(s) IV Intermittent every 4 hours  octreotide  Injectable 100 MICROGram(s) SubCutaneous two times a day  pantoprazole  Injectable 40 milliGRAM(s) IV Push every 12 hours  predniSONE   Tablet 40 milliGRAM(s) Oral daily  pregabalin 200 milliGRAM(s) Oral every 8 hours    MEDICATIONS  (PRN):  acetaminophen     Tablet .. 650 milliGRAM(s) Oral every 6 hours PRN Temp greater or equal to 38C (100.4F), Mild Pain (1 - 3)  aluminum hydroxide/magnesium hydroxide/simethicone Suspension 30 milliLiter(s) Oral every 4 hours PRN Dyspepsia  benzonatate 100 milliGRAM(s) Oral every 8 hours PRN Cough  dextrose Oral Gel 15 Gram(s) Oral once PRN Blood Glucose LESS THAN 70 milliGRAM(s)/deciliter  guaifenesin/dextromethorphan Oral Liquid 10 milliLiter(s) Oral every 4 hours PRN Cough  melatonin 3 milliGRAM(s) Oral at bedtime PRN Insomnia  naloxone Injectable 0.1 milliGRAM(s) IV Push every 3 minutes PRN For ANY of the following changes in patient status:  A. RR LESS THAN 10 breaths per minute, B. Oxygen saturation LESS THAN 90%, C. Sedation score of 6  ondansetron Injectable 4 milliGRAM(s) IV Push every 8 hours PRN Nausea and/or Vomiting      CAPILLARY BLOOD GLUCOSE      POCT Blood Glucose.: 148 mg/dL (15 Cristhian 2025 10:41)  POCT Blood Glucose.: 161 mg/dL (15 Cristhian 2025 08:51)  POCT Blood Glucose.: 196 mg/dL (14 Jan 2025 21:39)  POCT Blood Glucose.: 215 mg/dL (14 Jan 2025 16:29)    I&O's Summary    14 Jan 2025 07:01  -  15 Cristhian 2025 07:00  --------------------------------------------------------  IN: 1508 mL / OUT: 378 mL / NET: 1130 mL        PHYSICAL EXAM:  Vital Signs Last 24 Hrs  T(C): 36.8 (15 Cristhian 2025 07:38), Max: 37.1 (15 Cristhian 2025 04:02)  T(F): 98.3 (15 Cristhian 2025 07:38), Max: 98.7 (15 Cristhian 2025 04:02)  HR: 94 (15 Cristhian 2025 12:00) (74 - 118)  BP: 107/68 (15 Cristhian 2025 12:00) (96/55 - 117/67)  BP(mean): 80 (15 Cristhian 2025 12:00) (69 - 82)  RR: 18 (15 Cristhian 2025 12:00) (12 - 18)  SpO2: 96% (15 Cristhian 2025 12:00) (91% - 100%)    Parameters below as of 15 Cristhian 2025 12:00  Patient On (Oxygen Delivery Method): nasal cannula  O2 Flow (L/min): 3      CONSTITUTIONAL: NAD,  EYES: PERRLA; conjunctiva and sclera clear  ENMT: Moist oral mucosa,   RESPIRATORY: Normal respiratory effort; lungs are clear to auscultation bilaterally  CARDIOVASCULAR: Regular rate and rhythm, normal S1 and S2, no murmur   EXTS: No lower extremity edema; Peripheral pulses are 2+ bilaterally  ABDOMEN: Nontender to palpation, normoactive bowel sounds, no rebound/guarding;   MUSCLOSKELETAL:    no joint swelling or tenderness to palpation  PSYCH: calm  NEUROLOGY: A+O to person, place, and time; CN 2-12 are intact and symmetric; no gross sensory deficits;       LABS:                        7.9    10.16 )-----------( 123      ( 15 Cristhian 2025 06:10 )             25.4     01-15    138  |  102  |  12.3  ----------------------------<  135[H]  3.6   |  25.0  |  0.67    Ca    8.1[L]      15 Cristhian 2025 06:10            Urinalysis Basic - ( 15 Cristhian 2025 06:10 )    Color: x / Appearance: x / SG: x / pH: x  Gluc: 135 mg/dL / Ketone: x  / Bili: x / Urobili: x   Blood: x / Protein: x / Nitrite: x   Leuk Esterase: x / RBC: x / WBC x   Sq Epi: x / Non Sq Epi: x / Bacteria: x          RADIOLOGY & ADDITIONAL TESTS:  Results Reviewed:

## 2025-01-16 LAB
ALBUMIN SERPL ELPH-MCNC: 2.1 G/DL — LOW (ref 3.3–5.2)
ALP SERPL-CCNC: 127 U/L — HIGH (ref 40–120)
ALT FLD-CCNC: 13 U/L — SIGNIFICANT CHANGE UP
ANION GAP SERPL CALC-SCNC: 10 MMOL/L — SIGNIFICANT CHANGE UP (ref 5–17)
AST SERPL-CCNC: 17 U/L — SIGNIFICANT CHANGE UP
BILIRUB DIRECT SERPL-MCNC: 0.3 MG/DL — SIGNIFICANT CHANGE UP (ref 0–0.3)
BILIRUB INDIRECT FLD-MCNC: 0.5 MG/DL — SIGNIFICANT CHANGE UP (ref 0.2–1)
BILIRUB SERPL-MCNC: 0.8 MG/DL — SIGNIFICANT CHANGE UP (ref 0.4–2)
BLD GP AB SCN SERPL QL: SIGNIFICANT CHANGE UP
BUN SERPL-MCNC: 9.5 MG/DL — SIGNIFICANT CHANGE UP (ref 8–20)
CALCIUM SERPL-MCNC: 7.5 MG/DL — LOW (ref 8.4–10.5)
CHLORIDE SERPL-SCNC: 100 MMOL/L — SIGNIFICANT CHANGE UP (ref 96–108)
CO2 SERPL-SCNC: 24 MMOL/L — SIGNIFICANT CHANGE UP (ref 22–29)
CREAT SERPL-MCNC: 0.73 MG/DL — SIGNIFICANT CHANGE UP (ref 0.5–1.3)
CULTURE RESULTS: SIGNIFICANT CHANGE UP
EGFR: 108 ML/MIN/1.73M2 — SIGNIFICANT CHANGE UP
GLUCOSE BLDC GLUCOMTR-MCNC: 106 MG/DL — HIGH (ref 70–99)
GLUCOSE BLDC GLUCOMTR-MCNC: 166 MG/DL — HIGH (ref 70–99)
GLUCOSE BLDC GLUCOMTR-MCNC: 220 MG/DL — HIGH (ref 70–99)
GLUCOSE SERPL-MCNC: 116 MG/DL — HIGH (ref 70–99)
HCT VFR BLD CALC: 21.8 % — LOW (ref 34.5–45)
HCT VFR BLD CALC: 22.5 % — LOW (ref 34.5–45)
HCT VFR BLD CALC: 23.2 % — LOW (ref 34.5–45)
HGB BLD-MCNC: 7 G/DL — CRITICAL LOW (ref 11.5–15.5)
HGB BLD-MCNC: 7.1 G/DL — LOW (ref 11.5–15.5)
HGB BLD-MCNC: 7.2 G/DL — LOW (ref 11.5–15.5)
MCHC RBC-ENTMCNC: 28.5 PG — SIGNIFICANT CHANGE UP (ref 27–34)
MCHC RBC-ENTMCNC: 28.7 PG — SIGNIFICANT CHANGE UP (ref 27–34)
MCHC RBC-ENTMCNC: 29 PG — SIGNIFICANT CHANGE UP (ref 27–34)
MCHC RBC-ENTMCNC: 31 G/DL — LOW (ref 32–36)
MCHC RBC-ENTMCNC: 31.6 G/DL — LOW (ref 32–36)
MCHC RBC-ENTMCNC: 32.1 G/DL — SIGNIFICANT CHANGE UP (ref 32–36)
MCV RBC AUTO: 90.4 FL — SIGNIFICANT CHANGE UP (ref 80–100)
MCV RBC AUTO: 90.5 FL — SIGNIFICANT CHANGE UP (ref 80–100)
MCV RBC AUTO: 92.4 FL — SIGNIFICANT CHANGE UP (ref 80–100)
NRBC # BLD: 1 /100 WBCS — HIGH (ref 0–0)
NRBC # BLD: 3 /100 WBCS — HIGH (ref 0–0)
NRBC # BLD: 4 /100 WBCS — HIGH (ref 0–0)
NRBC BLD-RTO: 1 /100 WBCS — HIGH (ref 0–0)
NRBC BLD-RTO: 3 /100 WBCS — HIGH (ref 0–0)
NRBC BLD-RTO: 4 /100 WBCS — HIGH (ref 0–0)
PLATELET # BLD AUTO: 131 K/UL — LOW (ref 150–400)
PLATELET # BLD AUTO: 146 K/UL — LOW (ref 150–400)
PLATELET # BLD AUTO: 151 K/UL — SIGNIFICANT CHANGE UP (ref 150–400)
POTASSIUM SERPL-MCNC: 3.2 MMOL/L — LOW (ref 3.5–5.3)
POTASSIUM SERPL-SCNC: 3.2 MMOL/L — LOW (ref 3.5–5.3)
PROT SERPL-MCNC: 5 G/DL — LOW (ref 6.6–8.7)
RBC # BLD: 2.41 M/UL — LOW (ref 3.8–5.2)
RBC # BLD: 2.49 M/UL — LOW (ref 3.8–5.2)
RBC # BLD: 2.51 M/UL — LOW (ref 3.8–5.2)
RBC # FLD: 24.3 % — HIGH (ref 10.3–14.5)
RBC # FLD: 24.6 % — HIGH (ref 10.3–14.5)
RBC # FLD: 24.6 % — HIGH (ref 10.3–14.5)
SODIUM SERPL-SCNC: 134 MMOL/L — LOW (ref 135–145)
SPECIMEN SOURCE: SIGNIFICANT CHANGE UP
WBC # BLD: 5.12 K/UL — SIGNIFICANT CHANGE UP (ref 3.8–10.5)
WBC # BLD: 6.7 K/UL — SIGNIFICANT CHANGE UP (ref 3.8–10.5)
WBC # BLD: 6.88 K/UL — SIGNIFICANT CHANGE UP (ref 3.8–10.5)
WBC # FLD AUTO: 5.12 K/UL — SIGNIFICANT CHANGE UP (ref 3.8–10.5)
WBC # FLD AUTO: 6.7 K/UL — SIGNIFICANT CHANGE UP (ref 3.8–10.5)
WBC # FLD AUTO: 6.88 K/UL — SIGNIFICANT CHANGE UP (ref 3.8–10.5)

## 2025-01-16 PROCEDURE — 99233 SBSQ HOSP IP/OBS HIGH 50: CPT

## 2025-01-16 PROCEDURE — G0545: CPT

## 2025-01-16 RX ORDER — PREDNISONE 5 MG/1
30 TABLET ORAL DAILY
Refills: 0 | Status: DISCONTINUED | OUTPATIENT
Start: 2025-01-17 | End: 2025-01-17

## 2025-01-16 RX ORDER — METHADONE HYDROCHLORIDE 5 MG/5ML
5 SOLUTION ORAL
Refills: 0 | Status: DISCONTINUED | OUTPATIENT
Start: 2025-01-16 | End: 2025-01-23

## 2025-01-16 RX ORDER — DIPHENHYDRAMINE HCL 25 MG
25 CAPSULE ORAL ONCE
Refills: 0 | Status: COMPLETED | OUTPATIENT
Start: 2025-01-16 | End: 2025-01-16

## 2025-01-16 RX ORDER — BACTERIOSTATIC SODIUM CHLORIDE 0.9 %
1000 VIAL (ML) INJECTION
Refills: 0 | Status: DISCONTINUED | OUTPATIENT
Start: 2025-01-16 | End: 2025-01-17

## 2025-01-16 RX ORDER — AMINOCAPROIC ACID 1000 MG/1
4 TABLET ORAL EVERY 6 HOURS
Refills: 0 | Status: DISCONTINUED | OUTPATIENT
Start: 2025-01-16 | End: 2025-01-27

## 2025-01-16 RX ORDER — ALBENDAZOLE 200 MG/1
400 TABLET, FILM COATED ORAL DAILY
Refills: 0 | Status: COMPLETED | OUTPATIENT
Start: 2025-01-16 | End: 2025-01-20

## 2025-01-16 RX ORDER — POTASSIUM CHLORIDE 750 MG/1
10 TABLET, EXTENDED RELEASE ORAL
Refills: 0 | Status: COMPLETED | OUTPATIENT
Start: 2025-01-16 | End: 2025-01-16

## 2025-01-16 RX ORDER — SODIUM CHLORIDE 9 G/ML
1000 INJECTION, SOLUTION INTRAVENOUS
Refills: 0 | Status: DISCONTINUED | OUTPATIENT
Start: 2025-01-16 | End: 2025-01-16

## 2025-01-16 RX ORDER — METRONIDAZOLE 500 MG
500 TABLET ORAL EVERY 8 HOURS
Refills: 0 | Status: DISCONTINUED | OUTPATIENT
Start: 2025-01-16 | End: 2025-01-16

## 2025-01-16 RX ADMIN — ANTISEPTIC SURGICAL SCRUB 1 APPLICATION(S): 0.04 SOLUTION TOPICAL at 11:25

## 2025-01-16 RX ADMIN — Medication 25 MILLIGRAM(S): at 02:18

## 2025-01-16 RX ADMIN — ALBENDAZOLE 400 MILLIGRAM(S): 200 TABLET, FILM COATED ORAL at 11:25

## 2025-01-16 RX ADMIN — POTASSIUM CHLORIDE 100 MILLIEQUIVALENT(S): 750 TABLET, EXTENDED RELEASE ORAL at 15:48

## 2025-01-16 RX ADMIN — POTASSIUM CHLORIDE 100 MILLIEQUIVALENT(S): 750 TABLET, EXTENDED RELEASE ORAL at 14:57

## 2025-01-16 RX ADMIN — NAFCILLIN INJECTION 200 GRAM(S): 2 POWDER, FOR SOLUTION INTRAMUSCULAR; INTRAMUSCULAR; INTRAVENOUS at 16:46

## 2025-01-16 RX ADMIN — METHADONE HYDROCHLORIDE 5 MILLIGRAM(S): 5 SOLUTION ORAL at 13:28

## 2025-01-16 RX ADMIN — Medication 4 MILLILITER(S): at 14:38

## 2025-01-16 RX ADMIN — ACETAMINOPHEN 650 MILLIGRAM(S): 160 SUSPENSION ORAL at 22:27

## 2025-01-16 RX ADMIN — HYDROMORPHONE HYDROCHLORIDE 30 MILLILITER(S): 4 INJECTION, SOLUTION INTRAMUSCULAR; INTRAVENOUS; SUBCUTANEOUS at 19:14

## 2025-01-16 RX ADMIN — Medication 2: at 11:25

## 2025-01-16 RX ADMIN — ACETAMINOPHEN 650 MILLIGRAM(S): 160 SUSPENSION ORAL at 23:27

## 2025-01-16 RX ADMIN — NAFCILLIN INJECTION 200 GRAM(S): 2 POWDER, FOR SOLUTION INTRAMUSCULAR; INTRAMUSCULAR; INTRAVENOUS at 09:54

## 2025-01-16 RX ADMIN — HYDROMORPHONE HYDROCHLORIDE 30 MILLILITER(S): 4 INJECTION, SOLUTION INTRAMUSCULAR; INTRAVENOUS; SUBCUTANEOUS at 07:21

## 2025-01-16 RX ADMIN — Medication 1 APPLICATION(S): at 05:36

## 2025-01-16 RX ADMIN — Medication 1.25 MILLIGRAM(S): at 22:28

## 2025-01-16 RX ADMIN — NAFCILLIN INJECTION 200 GRAM(S): 2 POWDER, FOR SOLUTION INTRAMUSCULAR; INTRAMUSCULAR; INTRAVENOUS at 01:31

## 2025-01-16 RX ADMIN — Medication 25 MILLIGRAM(S): at 22:26

## 2025-01-16 RX ADMIN — ACETAMINOPHEN, DIPHENHYDRAMINE HCL, PHENYLEPHRINE HCL 3 MILLIGRAM(S): 325; 25; 5 TABLET ORAL at 21:13

## 2025-01-16 RX ADMIN — OCTREOTIDE ACETATE 100 MICROGRAM(S): 1000 INJECTION INTRAVENOUS; SUBCUTANEOUS at 05:35

## 2025-01-16 RX ADMIN — PREGABALIN CAPSULES, CV 200 MILLIGRAM(S): 225 CAPSULE ORAL at 21:10

## 2025-01-16 RX ADMIN — PREGABALIN CAPSULES, CV 200 MILLIGRAM(S): 225 CAPSULE ORAL at 05:35

## 2025-01-16 RX ADMIN — Medication 1.25 MILLIGRAM(S): at 14:38

## 2025-01-16 RX ADMIN — PANTOPRAZOLE 40 MILLIGRAM(S): 20 TABLET, DELAYED RELEASE ORAL at 16:46

## 2025-01-16 RX ADMIN — METHADONE HYDROCHLORIDE 5 MILLIGRAM(S): 5 SOLUTION ORAL at 05:48

## 2025-01-16 RX ADMIN — NAFCILLIN INJECTION 200 GRAM(S): 2 POWDER, FOR SOLUTION INTRAMUSCULAR; INTRAMUSCULAR; INTRAVENOUS at 21:10

## 2025-01-16 RX ADMIN — DEXTROMETHORPHAN HBR AND GUAIFENESIN ORAL SOLUTION 10 MILLILITER(S): 10; 100 LIQUID ORAL at 04:33

## 2025-01-16 RX ADMIN — Medication 4 MILLILITER(S): at 08:49

## 2025-01-16 RX ADMIN — Medication 100 MILLIGRAM(S): at 21:13

## 2025-01-16 RX ADMIN — Medication 1 APPLICATION(S): at 16:46

## 2025-01-16 RX ADMIN — Medication 60 MILLILITER(S): at 14:57

## 2025-01-16 RX ADMIN — NAFCILLIN INJECTION 200 GRAM(S): 2 POWDER, FOR SOLUTION INTRAMUSCULAR; INTRAMUSCULAR; INTRAVENOUS at 05:35

## 2025-01-16 RX ADMIN — PREDNISONE 40 MILLIGRAM(S): 5 TABLET ORAL at 05:35

## 2025-01-16 RX ADMIN — Medication 1: at 16:45

## 2025-01-16 RX ADMIN — Medication 1.25 MILLIGRAM(S): at 08:49

## 2025-01-16 RX ADMIN — PANTOPRAZOLE 40 MILLIGRAM(S): 20 TABLET, DELAYED RELEASE ORAL at 05:35

## 2025-01-16 RX ADMIN — PREGABALIN CAPSULES, CV 200 MILLIGRAM(S): 225 CAPSULE ORAL at 13:28

## 2025-01-16 RX ADMIN — DEXTROMETHORPHAN HBR AND GUAIFENESIN ORAL SOLUTION 10 MILLILITER(S): 10; 100 LIQUID ORAL at 21:13

## 2025-01-16 RX ADMIN — Medication 4 MILLILITER(S): at 22:29

## 2025-01-16 RX ADMIN — NAFCILLIN INJECTION 200 GRAM(S): 2 POWDER, FOR SOLUTION INTRAMUSCULAR; INTRAMUSCULAR; INTRAVENOUS at 13:28

## 2025-01-16 RX ADMIN — AMINOCAPROIC ACID 4 GRAM(S): 1000 TABLET ORAL at 16:55

## 2025-01-16 RX ADMIN — OCTREOTIDE ACETATE 100 MICROGRAM(S): 1000 INJECTION INTRAVENOUS; SUBCUTANEOUS at 16:45

## 2025-01-16 NOTE — PROGRESS NOTE ADULT - ASSESSMENT
40 yo F with h/o metastatic breast cancer ER/AZ Neg, HER-2 + on chemotherapy (last dose 12/26/24) (mets to lung, liver, spleen, spine, bone and brain).     Pain management following for pain control.    Recommendations:   - DC Fentanyl PCA    - Start Dilaudid PCA 0/0.35/8/6   - Continue home Methadone 5mg po bid at 0600 and 1400   - Continue home Methadone 10mg po qHS   - Continue home Lyrica 200mg po q8h    When due for discharge:   - DC Dilaudid PCA   - Recommend starting Dilaudid PO 4mg/8mg [id number] PRN mod/severe pain          Will continue to follow. Please call pain management with any questions [phone] 40 yo F with h/o metastatic breast cancer ER/MI Neg, HER-2 + on chemotherapy (last dose 12/26/24) (mets to lung, liver, spleen, spine, bone and brain).     Pain management following for pain control.    Recommendations:   - Continue Dilaudid PCA 0/0.35/8/6   - Continue home Methadone 5mg po bid at 0600 and 1400   - Continue home Methadone 10mg po qHS   - Continue home Lyrica 200mg po q8h    When due for discharge:   - DC Dilaudid PCA   - Recommend starting Dilaudid PO 4mg/8mg [id number] PRN mod/severe pain        Will continue to follow. Please call pain management with any questions [phone]

## 2025-01-16 NOTE — PROGRESS NOTE ADULT - SUBJECTIVE AND OBJECTIVE BOX
[facility]  INFECTIOUS DISEASES at [place] / [place] / [place]  =======================================================                              Salazar Toro MD                              Professor Emeritus:  Dr Warner Mcintosh MD            Diplomates American Board of Internal Medicine & Infectious Diseases                                   Tel  [phone] Fax [phone]                                  Hospital Consult line:  [phone]  =======================================================      CLNIT MYERSLE [id number]    Follow up: MSSA bacteremia    No fevers       Allergies:  pertuzumab (Other (Severe))  Perjeta (Other (Severe))        REVIEW OF SYSTEMS:  CONSTITUTIONAL:  No Fever or chills  HEENT:   No diplopia or blurred vision.  No earache, sore throat or runny nose.  CARDIOVASCULAR:  No Chest Pain  RESPIRATORY:  No cough, shortness of breath  GASTROINTESTINAL:  No nausea, vomiting + diarrhea.  GENITOURINARY:  No dysuria, frequency or urgency. No Blood in urine  MUSCULOSKELETAL:  no joint aches, no muscle pain  SKIN:  No change in skin, hair or nails.  NEUROLOGIC:  No Headaches      Physical Exam:  GEN: NAD  HEENT: normocephalic and atraumatic.    NECK: Supple.   LUNGS: CTA B/L.  HEART: RRR  ABDOMEN: Soft, NT, ND.  +BS.    : No CVA tenderness  EXTREMITIES: Without  edema.  MSK: No joint swelling  NEUROLOGIC: No Focal Deficits   SKIN: No rash        Vitals:  T(F): 97.8 (16 Jan 2025 07:50), Max: 98.7 (15 Cristhian 2025 19:25)  HR: 90 (16 Jan 2025 08:49)  BP: 92/57 (16 Jan 2025 08:00)  RR: 18 (16 Jan 2025 08:00)  SpO2: 100% (16 Jan 2025 08:49) (95% - 100%)  temp max in last 48H T(F): , Max: 98.7 (01-15-25 @ 04:02)    Current Antibiotics:  metroNIDAZOLE  IVPB 500 milliGRAM(s) IV Intermittent every 8 hours  nafcillin  IVPB 2 Gram(s) IV Intermittent every 4 hours    Other medications:  acetylcysteine 10%  Inhalation 4 milliLiter(s) Inhalation every 6 hours  chlorhexidine 2% Cloths 1 Application(s) Topical daily  dextrose 5%. 1000 milliLiter(s) IV Continuous <Continuous>  dextrose 5%. 1000 milliLiter(s) IV Continuous <Continuous>  dextrose 50% Injectable 25 Gram(s) IV Push once  dextrose 50% Injectable 12.5 Gram(s) IV Push once  dextrose 50% Injectable 25 Gram(s) IV Push once  glucagon  Injectable 1 milliGRAM(s) IntraMuscular once  hydrocortisone 2.5% Rectal Cream 1 Application(s) Rectal two times a day  HYDROmorphone PCA (1 mG/mL) 30 milliLiter(s) PCA Continuous PCA Continuous  insulin lispro (ADMELOG) corrective regimen sliding scale   SubCutaneous three times a day before meals  lactated ringers. 1000 milliLiter(s) IV Continuous <Continuous>  lactulose Syrup 10 Gram(s) Oral daily  levalbuterol Inhalation 1.25 milliGRAM(s) Inhalation every 6 hours  methadone    Tablet 5 milliGRAM(s) Oral <User Schedule>  octreotide  Injectable 100 MICROGram(s) SubCutaneous two times a day  pantoprazole  Injectable 40 milliGRAM(s) IV Push every 12 hours  predniSONE   Tablet 40 milliGRAM(s) Oral daily  pregabalin 200 milliGRAM(s) Oral every 8 hours                            7.2    6.88  )-----------( 151      ( 16 Jan 2025 06:10 )             23.2     01-16    134[L]  |  100  |  9.5  ----------------------------<  116[H]  3.2[L]   |  24.0  |  0.73    Ca    7.5[L]      16 Jan 2025 06:10    TPro  5.0[L]  /  Alb  2.1[L]  /  TBili  0.8  /  DBili  0.3  /  AST  17  /  ALT  13  /  AlkPhos  127[H]  01-16    RECENT CULTURES:  01-11 @ 05:25 .Blood BLOOD     No growth at 5 days    01-11 @ 05:20 .Blood BLOOD     No growth at 5 days    01-10 @ 18:20 .Surgical Swab     No growth to date.    01-10 @ 12:20 .Blood BLOOD     No growth at 5 days    01-09 @ 04:57 .Blood BLOOD Staphylococcus aureus    Growth in aerobic bottle: Staphylococcus aureus  Growth in aerobic bottle: Gram Positive Cocci in Clusters    01-08 @ 06:20 .Blood BLOOD     Growth in aerobic and anaerobic bottles: Staphylococcus aureus  See previous culture [id number]  Growth in anaerobic bottle: Gram Positive Cocci in Clusters  Growth in aerobic bottle: Gram Positive Cocci in Clusters    01-07 @ 06:19 .Blood BLOOD     Growth in aerobic and anaerobic bottles: Staphylococcus aureus  See previous culture [id number]  Growth in aerobic bottle: Gram Positive Cocci in Clusters  Growth in anaerobic bottle: Gram Positive Cocci in Clusters    01-07 @ 06:15 .Blood BLOOD     Growth in aerobic and anaerobic bottles: Staphylococcus aureus  See previous culture [id number]  Growth in aerobic bottle: Gram Positive Cocci in Clusters  Growth in anaerobic bottle: Gram Positive Cocci in Clusters    01-06 @ 01:34 .Sputum Sputum Staphylococcus aureus    Moderate Staphylococcus aureus  Commensal sylvester consistent with body site  Moderate Squamous epithelial cells seen per low power field  Moderate polymorphonuclear leukocytes seen per low power field  Few Gram Negative Rods seen per oil power field  Few Gram positive cocci in pairs seen per oil power field  Rare Gram Positive Rods seen per oil power field  Rare Yeast like cells seen per oil power field    01-05 @ 12:34 Clean Catch Clean Catch (Midstream) Escherichia coli    10,000 - 49,000 CFU/mL Escherichia coli  <10,000 CFU/mL Normal Urogenital Sylvester    01-05 @ 10:54 .Blood BLOOD Blood Culture PCR  Staphylococcus aureus    Growth in aerobic and anaerobic bottles: Staphylococcus aureus  Direct identification is available within approximately 3-5  hours either by Blood Panel Multiplexed PCR or Direct  MALDI-TOF. Details: [email or web address]  Growth in anaerobic bottle: Gram Positive Cocci in Clusters  Growth in aerobic bottle: Gram Positive Cocci in Clusters      WBC Count: 6.88 K/uL (01-16-25 @ 06:10)  WBC Count: 10.16 K/uL (01-15-25 @ 06:10)  WBC Count: 10.83 K/uL (01-14-25 @ 20:28)  WBC Count: 8.82 K/uL (01-14-25 @ 07:31)  WBC Count: 8.77 K/uL (01-14-25 @ 04:48)  WBC Count: 16.34 K/uL (01-13-25 @ 16:31)  WBC Count: 10.58 K/uL (01-13-25 @ 06:10)  WBC Count: 12.54 K/uL (01-12-25 @ 09:00)  WBC Count: 11.54 K/uL (01-12-25 @ 01:36)  WBC Count: 10.18 K/uL (01-11-25 @ 21:00)    Creatinine: 0.73 mg/dL (01-16-25 @ 06:10)  Creatinine: 0.67 mg/dL (01-15-25 @ 06:10)  Creatinine: 0.54 mg/dL (01-14-25 @ 04:48)  Creatinine: 0.47 mg/dL (01-13-25 @ 06:10)  Creatinine: 0.53 mg/dL (01-12-25 @ 09:00)    Procalcitonin: 1.37 ng/mL (01-08-25 @ 06:20)  Procalcitonin: 2.32 ng/mL (01-07-25 @ 06:15)     SARS-CoV-2: NotDetec (01-05-25 @ 10:54)  SARS-CoV-2 Result: NotDetec (01-02-25 @ 19:20)    Influenza AH3 (RapRVP): Detected (01.05.25 @ 10:54)    Urinalysis + Microscopic Examination (01.06.25 @ 03:00)    pH Urine: 6.0   Urine Appearance: Clear   Color: Yellow   Specific Gravity: 1.015   Protein, Urine: Trace mg/dL   Glucose Qualitative, Urine: Negative mg/dL   Ketone - Urine: Negative mg/dL   Blood, Urine: Small   Bilirubin: Negative   Urobilinogen: 1.0 mg/dL   Leukocyte Esterase Concentration: Small   Nitrite: Negative   White Blood Cell - Urine: 6 /HPF   Red Blood Cell - Urine: 5 /HPF   Bacteria: Occasional /HPF   Epithelial Cells: 2 /HPF      GI PCR Panel Stool (01.15.25 @ 06:15)    GI PCR Panel: Detected   Giardia lamblia: Detected   Norovirus GI/GII: Detected              < from: TTE Limited W or WO Ultrasound Enhancing Agent (01.06.25 @ 12:55) >  CONCLUSIONS:      1. Technically difficult image quality.   2. Left ventricular systolic function is normal with an ejection fraction visually estimated at 65 to 70 %.   3. Normal right ventricular cavity size, with normal wall thickness, and normal right ventricular systolic function.   4. Compared to the transthoracic echocardiogram performed on 7/21/2024, there have been no significant interval changes.    < end of copied text >

## 2025-01-16 NOTE — PROGRESS NOTE ADULT - ASSESSMENT
39y  Female with h/o metastatic breast cancer ER/AL Neg, HER-2 + on chemotherapy (last dose 12/26/24) (mets to lung, liver, spleen, spine, bone and brain), gastritis w/ intermittent episodes of dark stool (follows w/ Dr. Rosado GI and is on PPI and octreotide daily). Patient presented 1/5 with c/o worsening SOB.  She was seen in ED 1/2/25 for for weakness and SOB at which time she was found to have Hb of 5 requiring PRBC transfusion and positive for Flu A and started on tamiflu and was discharged from the ED on 1/3/25.  Her respiratory status has worsened since then w/ productive cough associated with body aches and chills.  Denies sick contacts but has had many frequent hospital visits. Patient has been afebrile, no leukocytosis. Was placed on BIPAP for Worsening respiratory status. continued on Tamiflu. Vancomycn and Zosyn was added. Blood cultures with MSSA. ID input requested.       MSSA bacteremia   Staphylococcus aureus PNA   Influenza A   metastatic breast cancer ER/AL Neg, HER-2 + on chemotherapy   Port in place s/p explant 1/10/25  Stool PCR + Norovirus and Giardia       - Blood cultures 1/5, 1/7, 1/9 reporting MSSA    - Repeat blood cultures 1/10 and 1/11/25 no growth   - Sputum Cx 1/6 Staphylococcus aureus  - Urine Cx 1/5 reporting 10k - 49k Escherichia coli  of ? significance since UA not concerning for UTI   - Stool PCR + Norovirus and Giardia   - RVP/COVID 19 PCR + Influenza A  - S/P Port removal 1/10/25  - CTA Chest reporting No acute pulmonary embolism. Wide spread patchy airspace opacities bilaterally, increased since the prior exam.  - US RUQ reporting No evidence of acute cholecystitis or biliary ductal dilatation.  - Procalcitonin level 2.32 --> 1.37  - TTE 1/6 with no veg  - Called cardiology consult for MARYANN if possible   - GI eval noted, no plan for EGD   - s/p Port removal 1/10/25  - Completed oseltamivir  1/10/25  - Continue Nafcillin 2gm IV [id number]  - For Norovirus, continue hydration and supportive care  - For Giardia, confirm the Giardia stool Ag  - D/C Metronidazole  - Start Albendazole 400mg PO q 24hours x 5 days in the meantime for Giardia   - Hold off on PICC/Midline for now unless needed for reasons other than infectious diseases  - Follow up cultures  - Trend Fever  - Trend WBC      Thank you for allowing me to participate in the care of your patient.   Will Follow    Discussed treatment plan with:  Jessica SEQUEIRA and Clinical pharmacy

## 2025-01-16 NOTE — PROGRESS NOTE ADULT - ASSESSMENT
39y  Female with h/o metastatic breast cancer ER/CA Neg, HER-2 + on chemotherapy (last dose 12/26/24) (mets to lung, liver, spleen, spine, bone and brain), gastritis w/ intermittent episodes of dark stool (follows w/ Dr. Rosado GI and is on PPI and octreotide daily). Patient presented 1/5 with c/o worsening SOB.  She was seen in ED 1/2/25 for for weakness and SOB at which time she was found to have Hb of 5 requiring PRBC transfusion and positive for Flu A and started on tamiflu and was discharged from the ED on 1/3/25.  Her respiratory status has worsened since then w/ productive cough associated with body aches and chills.  Denies sick contacts but has had many frequent hospital visits. Patient has been afebrile, no leukocytosis. Was placed on BIPAP for Worsening respiratory status. continued on Tamiflu. Vancomycn and Zosyn was added. Blood cultures with MSSA.     Metastatic breast cancer   - last dose of trastuzumab was on 12/26/24.  -All treatment on hold at the moment.  -Follow up with primary oncologist, Dr. Chapa after discharge    Cytopenias   - secondary to malignancy and acute illness and now GIB  - Plt 123k  -  Hgb 6.4 yesterday S/P 1 U PRBC hgb went up to 8.7 now 7.0 this AM  - GI evaluated for large bloody bowel movement  Suspect rectal bleeding is due to diverticular bleed which are self-limiting - If patient becomes hemodynamically unstable, requiring multiple transfusions,   - CT A/P No evidence of active intraluminal extravasation of contrast.   - GI f/u noted.  no plans for scope.  Previously scoped in Nov. 2024. internal hemorrhoids, gastric erosions, no active bleeding site   - c/w home sub q octreotide and IV protonix  -Transfuse if hgb<7.0.      Respiratory failure   - multifocal pneumonia and flu+  - oseltamivir through 1/10  - Management as per primary team.  - TTE 1/6 with no veg  - Cardiology following- . No MARYANN at this time until stabilizes and source of bleed ascertained    MSSA Bacteremia  - cultures persistently positive. pt afebrile.   --  ID following -On nafcillin   - Would remove Port given persistent Bacteremia   -  Agree with port removal and placement of PICC or midline prior to d/c for outpt chemo     + Noro virus and giardia lamblia    Cont aggressive pain management. Remains on PCA pump.    Will follow                39y  Female with h/o metastatic breast cancer ER/ND Neg, HER-2 + on chemotherapy (last dose 12/26/24) (mets to lung, liver, spleen, spine, bone and brain), gastritis w/ intermittent episodes of dark stool (follows w/ Dr. Rosado GI and is on PPI and octreotide daily). Patient presented 1/5 with c/o worsening SOB.  She was seen in ED 1/2/25 for for weakness and SOB at which time she was found to have Hb of 5 requiring PRBC transfusion and positive for Flu A and started on tamiflu and was discharged from the ED on 1/3/25.  Her respiratory status has worsened since then w/ productive cough associated with body aches and chills.  Denies sick contacts but has had many frequent hospital visits. Patient has been afebrile, no leukocytosis. Was placed on BIPAP for Worsening respiratory status. continued on Tamiflu. Vancomycn and Zosyn was added. Blood cultures with MSSA.     Metastatic breast cancer   - last dose of trastuzumab was on 12/26/24.  -All treatment on hold at the moment.  -Follow up with primary oncologist, Dr. Chapa after discharge    Cytopenias   - secondary to malignancy and acute illness and now GIB  - Plt 123k  -  Hgb 6.4 yesterday S/P 1 U PRBC hgb went up to 8.7 now 7.2 this AM and 7 this afternoon  - GI evaluated for large bloody bowel movement  Suspect rectal bleeding is due to diverticular bleed which are self-limiting - If patient becomes hemodynamically unstable, requiring multiple transfusions,   - CT A/P No evidence of active intraluminal extravasation of contrast.   - GI f/u noted.  no plans for scope.  Previously scoped in Nov. 2024. internal hemorrhoids, gastric erosions, no active bleeding site   - c/w home sub q octreotide and IV protonix  - Will start Amicar 4 grams Q 6 H for bleeding- will titrate according to HGB   -Transfuse if hgb<7.0.      Respiratory failure   - multifocal pneumonia and flu+  - oseltamivir through 1/10  - Management as per primary team.  - TTE 1/6 with no veg  - Cardiology following- . No MARYANN at this time until stabilizes and source of bleed ascertained    MSSA Bacteremia  - cultures persistently positive. pt afebrile.   --  ID following -On nafcillin   - Would remove Port given persistent Bacteremia   -  Agree with port removal and placement of PICC or midline prior to d/c for outpt chemo     + Noro virus and giardia lamblia    Cont aggressive pain management. Remains on PCA pump.    Will follow

## 2025-01-16 NOTE — PROVIDER CONTACT NOTE (CRITICAL VALUE NOTIFICATION) - ASSESSMENT
pt vss , hr 98, bp 107/66, pt state she "doesn't feel well"
Pt A&Ox4, VSS, on 3LNC
Lactate 3.7
Pt. asymptomatic. Vitals within her normal range.
VSS NAD

## 2025-01-16 NOTE — PROGRESS NOTE ADULT - ASSESSMENT
39y  Female with h/o metastatic breast cancer ER/TX Neg, HER-2 + on chemotherapy (last dose 12/26/24) (mets to lung, liver, spleen, spine, bone and brain), gastritis w/ intermittent episodes of dark stool (follows w/ Dr. Rosado GI and is on PPI and octreotide daily). Patient presented 1/5 with c/o worsening SOB.  She was seen in ED 1/2/25 for for weakness and SOB at which time she was found to have Hb of 5 requiring PRBC transfusion and positive for Flu A and started on tamiflu and was discharged from the ED on 1/3/25.  Her respiratory status has worsened since then w/ productive cough associated with body aches and chills. Admitted for sepsis ,acute hypoxic respiratory failure with flu A. Currently on HFNC for worsening respiratory status.S/P  Tamiflu. Vancomycn and Zosyn was added. Blood cultures with MSSA.  Cardiology consultation requested for evaluation of endocarditis.   Blood cultures 1/5, 1/7, 1/9 reporting MSSA , Repeat blood cultures ngtd on 1/11/25, Sputum Cx 1/6 Staphylococcus aureus. MARYANN hold for active gib requring transfusion. Pt had large BM likley diverticular bleed. Had recent scopy. Gi following. ct abd/pelvis w/iv contrast no active bleed (1/13). Urine Cx 1/5 reporting 10k - 49k Escherichia coli  of ? significance  since UA not concerning for UTI.  RVP/COVID 19 PCR + Influenza A.GI PCR  positive norovirus,Giadia.      Nororvirus,Giadia gastroenteritis  -GI PCR  positive norovirus,Giadia  -Albendazole per ID  -BMP .Replaced lytes    Acute blood loss anemia   GIB  -hb 7.last transfuse 1u prbc 1/14  -s/p multiple transfusion   -CTA abd/pelvis  no active bleed   -PPI/OCTRIO  -GI eval with no plan for scopes 2/2 recently done.  Still with bleeding.  reed GI history, ? GAVE disease per GI notes and prior EGD . H as had multiple EGD/colonoscopies in past as well.   -hem/onc following      Thrombocytopenia  likely from chemo/sepsis  -will monitor cbc        Sepsis 2/2 Flu A w/ superimposed multifocal PNA  Acute hypoxic respiratory failure 2/2 Flu A w/ superimposed multifocal PNA   - on NC, comfortable  - persistently positive RVP for flu A  - s/p  tamiflu through 1/10  - repeat CT 1/8 shows improved pleural effusion but worsening GGOs, unclear if infectious or malignant etiology  - repeat CT 1/9 with similar findings  - repeat CT next week to evaluate for improvement of infiltrates  - s/p  IV lasix 40mg qd x 2 days given recent blood transfusion  - po steroid tapering dose  - c/w abx per ID recs  - sputum clx growing moderate staph aureus   - TTE EF 65-70%, no WMA    MSSA bacteremia  - blood clx positive 1/5, repeat bl c/s (1/11) ngtd  - ID following  - c/w nafcillin  per id  - MARYANN on hold for gib. f/u cardio rec  - per ID, may need chemo port removal, will follow up  -f/u ID REC        Chronic normocytic anemia 2/2 metastatic breast cancer on chemo and 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   Thrombocytopenia likely 2/2 sepsis   Breast cancer ER/TX Neg, HER-2 pos on chemotherapy w/ mets to lung, liver, spleen, spine, bone and brain  - Baseline Hb ranges 8-9    - s/p prbc transfusion on 1/2, 1/8 and 1/9,1/11,1/13,1/14  - trend CBC, transfuse for hgb <7  - c/w home sub q octreotide and IV protonix  - Monitor CBC and transfuse for Hb<7 and plts<20K in setting of sepsis   - [facility] following  - monitor on tele and     Breast cancer ER/TX Neg, HER-2 pos on chemotherapy w/ mets to lung, liver, spleen, spine, bone and brain  - c/w pregabalin, methylphenidate (confirmed on ISTOP Reference #: [id number])  - Reports being on methadone for pain but last 30 day script on istop dispensed 09/19/24  - Is on po dilaudid 4mg prn w/ last script dispensed 12/04/24   - dc fentanyl drip, started dilaudid pca today. methadon  - pain management following, revert back to PO Dilaudid regimen on d/c .will f/u rec    dvt ppx scd'd  disposition: pending. improvement of bloody bm/anemia, pain control.  dw pt

## 2025-01-16 NOTE — PROVIDER CONTACT NOTE (CRITICAL VALUE NOTIFICATION) - SITUATION
Abnormal Hgb & Hct
Pt had large red BM
Lactate 3.7
pt has hgn of 5.2 hct 16.1
40 y/o F pmhx metastatic breast cancer (mets to lung, liver spleen, spine, bone and brain) worsening respiratory status with multifocal pna
pt has a h/o of metastatic CA, chronically anemic, daily labs drawn

## 2025-01-16 NOTE — PROGRESS NOTE ADULT - SUBJECTIVE AND OBJECTIVE BOX
Interval Hx:  Patient seen during rounds  Patient reports pain to be controlled on current medications  Patient denies sedation with medications      Analgesic Dosing for past 24 hours reviewed as below:    diphenhydrAMINE   25 milliGRAM(s) Oral (01-15-25 @ 21:46)    diphenhydrAMINE   25 milliGRAM(s) Oral (01-16-25 @ 02:18)    HYDROmorphone  Injectable   1 milliGRAM(s) IV Push (01-15-25 @ 11:38)    melatonin   3 milliGRAM(s) Oral (01-15-25 @ 21:13)    methadone    Tablet   5 milliGRAM(s) Oral (01-15-25 @ 14:58)    methadone    Tablet   5 milliGRAM(s) Oral (01-16-25 @ 05:48)    ondansetron Injectable   4 milliGRAM(s) IV Push (01-15-25 @ 10:43)    pregabalin   200 milliGRAM(s) Oral (01-16-25 @ 05:35)   200 milliGRAM(s) Oral (01-15-25 @ 21:12)   200 milliGRAM(s) Oral (01-15-25 @ 14:57)          T(C): 36.6 (01-16-25 @ 07:50), Max: 37.1 (01-15-25 @ 19:25)  HR: 90 (01-16-25 @ 06:00) (83 - 118)  BP: 104/60 (01-16-25 @ 06:00) (95/62 - 117/74)  RR: 18 (01-16-25 @ 06:00) (12 - 18)  SpO2: 99% (01-16-25 @ 06:00) (95% - 100%)      01-15-25 @ 07:01  -  01-16-25 @ 07:00  --------------------------------------------------------  IN: 1880 mL / OUT: 0 mL / NET: 1880 mL        acetaminophen     Tablet .. 650 milliGRAM(s) Oral every 6 hours PRN  acetylcysteine 10%  Inhalation 4 milliLiter(s) Inhalation every 6 hours  aluminum hydroxide/magnesium hydroxide/simethicone Suspension 30 milliLiter(s) Oral every 4 hours PRN  benzonatate 100 milliGRAM(s) Oral every 8 hours PRN  chlorhexidine 2% Cloths 1 Application(s) Topical daily  dextrose 5%. 1000 milliLiter(s) IV Continuous <Continuous>  dextrose 5%. 1000 milliLiter(s) IV Continuous <Continuous>  dextrose 50% Injectable 25 Gram(s) IV Push once  dextrose 50% Injectable 12.5 Gram(s) IV Push once  dextrose 50% Injectable 25 Gram(s) IV Push once  dextrose Oral Gel 15 Gram(s) Oral once PRN  glucagon  Injectable 1 milliGRAM(s) IntraMuscular once  guaifenesin/dextromethorphan Oral Liquid 10 milliLiter(s) Oral every 4 hours PRN  hydrocortisone 2.5% Rectal Cream 1 Application(s) Rectal two times a day  HYDROmorphone PCA (1 mG/mL) 30 milliLiter(s) PCA Continuous PCA Continuous  insulin lispro (ADMELOG) corrective regimen sliding scale   SubCutaneous three times a day before meals  lactated ringers. 1000 milliLiter(s) IV Continuous <Continuous>  lactulose Syrup 10 Gram(s) Oral daily  levalbuterol Inhalation 1.25 milliGRAM(s) Inhalation every 6 hours  melatonin 3 milliGRAM(s) Oral at bedtime PRN  methadone    Tablet 5 milliGRAM(s) Oral <User Schedule>  metroNIDAZOLE  IVPB 500 milliGRAM(s) IV Intermittent every 8 hours  nafcillin  IVPB 2 Gram(s) IV Intermittent every 4 hours  naloxone Injectable 0.1 milliGRAM(s) IV Push every 3 minutes PRN  octreotide  Injectable 100 MICROGram(s) SubCutaneous two times a day  ondansetron Injectable 4 milliGRAM(s) IV Push every 8 hours PRN  pantoprazole  Injectable 40 milliGRAM(s) IV Push every 12 hours  predniSONE   Tablet 40 milliGRAM(s) Oral daily  pregabalin 200 milliGRAM(s) Oral every 8 hours                          7.2    6.88  )-----------( 151      ( 16 Jan 2025 06:10 )             23.2     01-16    134[L]  |  100  |  9.5  ----------------------------<  116[H]  3.2[L]   |  24.0  |  0.73    Ca    7.5[L]      16 Jan 2025 06:10    TPro  5.0[L]  /  Alb  2.1[L]  /  TBili  0.8  /  DBili  0.3  /  AST  17  /  ALT  13  /  AlkPhos  127[H]  01-16      Urinalysis Basic - ( 16 Jan 2025 06:10 )    Color: x / Appearance: x / SG: x / pH: x  Gluc: 116 mg/dL / Ketone: x  / Bili: x / Urobili: x   Blood: x / Protein: x / Nitrite: x   Leuk Esterase: x / RBC: x / WBC x   Sq Epi: x / Non Sq Epi: x / Bacteria: x        Pain Service   [phone]

## 2025-01-16 NOTE — PROVIDER CONTACT NOTE (CRITICAL VALUE NOTIFICATION) - BACKGROUND
pt has a h/o of metastatic CA, chronically anemic
Lactate 3.7
Pt. admitted for metastatic ca c/b GIB.
hx of metastatic breast ca. hgn 5 on arrival, mult blood transfusions.

## 2025-01-16 NOTE — PROGRESS NOTE ADULT - SUBJECTIVE AND OBJECTIVE BOX
39y  Female with h/o metastatic breast cancer ER/IA Neg, HER-2 + on chemotherapy (last dose 12/26/24) (mets to lung, liver, spleen, spine, bone and brain), gastritis w/ intermittent episodes of dark stool (follows w/ Dr. Rosado GI and is on PPI and octreotide daily). Patient presented 1/5 with c/o worsening SOB.  She was seen in ED 1/2/25 for for weakness and SOB at which time she was found to have Hb of 5 requiring PRBC transfusion and positive for Flu A and started on tamiflu and was discharged from the ED on 1/3/25.  Her respiratory status has worsened since then w/ productive cough associated with body aches and chills.  Denies sick contacts but has had many frequent hospital visits. Patient has been afebrile, no leukocytosis. Was placed on BIPAP for Worsening respiratory status. continued on Tamiflu. Vancomycn and Zosyn was added. Blood cultures with MSSA.     PAST MEDICAL & SURGICAL HISTORY:  H/O compression fracture of spine  Anxiety  Metastatic breast cancer  H/O pleural effusion  Pericardial effusion  S/P tonsillectomy  H/O chest tube placement  12/23/21  S/P pericardiocentesis  12/28/21    Allergies  pertuzumab (Other (Severe))  Perjeta (Other (Severe))    MEDICATIONS  (STANDING):  acetylcysteine 10%  Inhalation 4 milliLiter(s) Inhalation every 6 hours  albuterol/ipratropium for Nebulization. 3 milliLiter(s) Nebulizer once  chlorhexidine 2% Cloths 1 Application(s) Topical daily  dextrose 5%. 1000 milliLiter(s) (50 mL/Hr) IV Continuous <Continuous>  dextrose 5%. 1000 milliLiter(s) (100 mL/Hr) IV Continuous <Continuous>  dextrose 50% Injectable 25 Gram(s) IV Push once  dextrose 50% Injectable 12.5 Gram(s) IV Push once  dextrose 50% Injectable 25 Gram(s) IV Push once  furosemide   Injectable 40 milliGRAM(s) IV Push daily  glucagon  Injectable 1 milliGRAM(s) IntraMuscular once  HYDROmorphone PCA (1 mG/mL) 30 milliLiter(s) PCA Continuous PCA Continuous  insulin lispro (ADMELOG) corrective regimen sliding scale   SubCutaneous three times a day before meals  levalbuterol Inhalation 1.25 milliGRAM(s) Inhalation every 6 hours  methadone    Tablet 10 milliGRAM(s) Oral at bedtime  methadone    Tablet 5 milliGRAM(s) Oral <User Schedule>  methylPREDNISolone sodium succinate Injectable 40 milliGRAM(s) IV Push every 24 hours  nafcillin  IVPB 2 Gram(s) IV Intermittent every 4 hours  octreotide  Injectable 100 MICROGram(s) SubCutaneous two times a day  pantoprazole  Injectable 40 milliGRAM(s) IV Push every 12 hours  pregabalin 200 milliGRAM(s) Oral every 8 hours    MEDICATIONS  (PRN):  acetaminophen     Tablet .. 650 milliGRAM(s) Oral every 6 hours PRN Temp greater or equal to 38C (100.4F), Mild Pain (1 - 3)  aluminum hydroxide/magnesium hydroxide/simethicone Suspension 30 milliLiter(s) Oral every 4 hours PRN Dyspepsia  benzonatate 100 milliGRAM(s) Oral every 8 hours PRN Cough  dextrose Oral Gel 15 Gram(s) Oral once PRN Blood Glucose LESS THAN 70 milliGRAM(s)/deciliter  guaifenesin/dextromethorphan Oral Liquid 10 milliLiter(s) Oral every 4 hours PRN Cough  melatonin 3 milliGRAM(s) Oral at bedtime PRN Insomnia  naloxone Injectable 0.1 milliGRAM(s) IV Push every 3 minutes PRN For ANY of the following changes in patient status:  A. RR LESS THAN 10 breaths per minute, B. Oxygen saturation LESS THAN 90%, C. Sedation score of 6  ondansetron Injectable 4 milliGRAM(s) IV Push every 8 hours PRN Nausea and/or Vomiting    Vital Signs Last 24 Hrs  T(C): 36.6 (01-16-25 @ 07:50), Max: 37.1 (01-15-25 @ 19:25)  T(F): 97.8 (01-16-25 @ 07:50), Max: 98.7 (01-15-25 @ 19:25)  HR: 90 (01-16-25 @ 08:49) (83 - 118)  BP: 92/57 (01-16-25 @ 08:00) (92/57 - 117/74)  BP(mean): 65 (01-16-25 @ 08:00) (65 - 85)  RR: 18 (01-16-25 @ 08:00) (12 - 18)  SpO2: 100% (01-16-25 @ 08:49) (95% - 100%)      PE:  NAD  On O2 NC  bilateral rales  abd soft, nd, nt  a/o x 3      CBC                          7.2    6.88  )-----------( 151      ( 16 Jan 2025 06:10 )             23.2                           7.9    10.16 )-----------( 123      ( 15 Cristhian 2025 06:10 )             25.4                           6.4    8.82  )-----------( 92       ( 14 Jan 2025 07:31 )             19.9                             6.7    10.58 )-----------( 94       ( 13 Jan 2025 06:10 )             20.8         Chem:       01-15    138  |  102  |  12.3  ----------------------------<  135[H]  3.6   |  25.0  |  0.67    Ca    8.1[L]      15 Cristhian 2025 06:10      01-14    141  |  103  |  13.4  ----------------------------<  127[H]  3.4[L]   |  30.0[H]  |  0.54    Ca    8.1[L]      14 Jan 2025 04:48        01-13    141  |  103  |  20.3[H]  ----------------------------<  137[H]  3.8   |  27.0  |  0.47[L]    Ca    8.2[L]      13 Jan 2025 06:10        01-09    138  |  100  |  21.3[H]  ----------------------------<  152[H]  3.5   |  29.0  |  0.54    Ca    7.0[L]      09 Jan 2025 04:57  Mg     1.9     01-08    TPro  5.0[L]  /  Alb  2.5[L]  /  TBili  1.8  /  DBili  1.3[H]  /  AST  24  /  ALT  23  /  AlkPhos  238[H]  01-09 01-08    140  |  103  |  17.8  ----------------------------<  112[H]  3.5   |  27.0  |  0.56    Ca    6.8[L]      08 Jan 2025 06:20  Mg     1.9     01-08    TPro  5.4[L]  /  Alb  2.5[L]  /  TBili  1.9  /  DBili  1.3[H]  /  AST  24  /  ALT  25  /  AlkPhos  240[H]  01-08          ( 01-07-25 @ 06:15 )    139  |  106  |  17.5  ----------------------------<  394[H]  3.3[L]   |  20.0[L]  |  0.61    Liver Functions: ( 01-07-25 @ 06:15 )  Alb: 2.6 g/dL / Pro: 5.5 g/dL / ALK PHOS: 228 U/L / ALT: 22 U/L / AST: 16 U/L / GGT: x

## 2025-01-16 NOTE — PROGRESS NOTE ADULT - SUBJECTIVE AND OBJECTIVE BOX
Patient is a 39y old  Female who presents with a chief complaint of sepsis (16 Jan 2025 09:16)      pt denies n/v/d,fever,chill  last loose bm yesterday    REVIEW OF SYSTEMS: All systems are reviewed and found to be negative except above    MEDICATIONS  (STANDING):  acetylcysteine 10%  Inhalation 4 milliLiter(s) Inhalation every 6 hours  albendazole 400 milliGRAM(s) Oral daily  chlorhexidine 2% Cloths 1 Application(s) Topical daily  dextrose 5%. 1000 milliLiter(s) (50 mL/Hr) IV Continuous <Continuous>  dextrose 5%. 1000 milliLiter(s) (100 mL/Hr) IV Continuous <Continuous>  dextrose 50% Injectable 25 Gram(s) IV Push once  dextrose 50% Injectable 12.5 Gram(s) IV Push once  dextrose 50% Injectable 25 Gram(s) IV Push once  glucagon  Injectable 1 milliGRAM(s) IntraMuscular once  hydrocortisone 2.5% Rectal Cream 1 Application(s) Rectal two times a day  HYDROmorphone PCA (1 mG/mL) 30 milliLiter(s) PCA Continuous PCA Continuous  insulin lispro (ADMELOG) corrective regimen sliding scale   SubCutaneous three times a day before meals  levalbuterol Inhalation 1.25 milliGRAM(s) Inhalation every 6 hours  methadone    Tablet 5 milliGRAM(s) Oral <User Schedule>  nafcillin  IVPB 2 Gram(s) IV Intermittent every 4 hours  octreotide  Injectable 100 MICROGram(s) SubCutaneous two times a day  pantoprazole  Injectable 40 milliGRAM(s) IV Push every 12 hours  predniSONE   Tablet 40 milliGRAM(s) Oral daily  pregabalin 200 milliGRAM(s) Oral every 8 hours    MEDICATIONS  (PRN):  acetaminophen     Tablet .. 650 milliGRAM(s) Oral every 6 hours PRN Temp greater or equal to 38C (100.4F), Mild Pain (1 - 3)  aluminum hydroxide/magnesium hydroxide/simethicone Suspension 30 milliLiter(s) Oral every 4 hours PRN Dyspepsia  benzonatate 100 milliGRAM(s) Oral every 8 hours PRN Cough  dextrose Oral Gel 15 Gram(s) Oral once PRN Blood Glucose LESS THAN 70 milliGRAM(s)/deciliter  guaifenesin/dextromethorphan Oral Liquid 10 milliLiter(s) Oral every 4 hours PRN Cough  melatonin 3 milliGRAM(s) Oral at bedtime PRN Insomnia  naloxone Injectable 0.1 milliGRAM(s) IV Push every 3 minutes PRN For ANY of the following changes in patient status:  A. RR LESS THAN 10 breaths per minute, B. Oxygen saturation LESS THAN 90%, C. Sedation score of 6  ondansetron Injectable 4 milliGRAM(s) IV Push every 8 hours PRN Nausea and/or Vomiting      CAPILLARY BLOOD GLUCOSE      POCT Blood Glucose.: 220 mg/dL (16 Jan 2025 11:23)  POCT Blood Glucose.: 106 mg/dL (16 Jan 2025 09:00)  POCT Blood Glucose.: 123 mg/dL (15 Cristhian 2025 22:15)  POCT Blood Glucose.: 147 mg/dL (15 Cristhian 2025 16:30)    I&O's Summary    15 Cristhian 2025 07:01  -  16 Jan 2025 07:00  --------------------------------------------------------  IN: 1880 mL / OUT: 0 mL / NET: 1880 mL        PHYSICAL EXAM:  Vital Signs Last 24 Hrs  T(C): 36.6 (16 Jan 2025 07:50), Max: 37.1 (15 Cristhian 2025 19:25)  T(F): 97.8 (16 Jan 2025 07:50), Max: 98.7 (15 Cristhian 2025 19:25)  HR: 83 (16 Jan 2025 14:00) (83 - 107)  BP: 100/67 (16 Jan 2025 14:00) (92/57 - 118/57)  BP(mean): 79 (16 Jan 2025 14:00) (65 - 79)  RR: 17 (16 Jan 2025 14:00) (17 - 18)  SpO2: 100% (16 Jan 2025 14:00) (95% - 100%)    Parameters below as of 16 Jan 2025 14:00  Patient On (Oxygen Delivery Method): nasal cannula  O2 Flow (L/min): 2      CONSTITUTIONAL: NAD,  EYES: PERRLA; conjunctiva and sclera clear  ENMT: Moist oral mucosa,   RESPIRATORY: Normal respiratory effort; lungs are clear to auscultation bilaterally  CARDIOVASCULAR: Regular rate and rhythm, normal S1 and S2, no murmur   EXTS: No lower extremity edema; Peripheral pulses are 2+ bilaterally  ABDOMEN: Nontender to palpation, normoactive bowel sounds, no rebound/guarding;   MUSCLOSKELETAL:   no joint swelling or tenderness to palpation  PSYCH: affect appropriate  NEUROLOGY: A+O to person, place, and time; CN 2-12 are intact and symmetric; no gross sensory deficits;       LABS:                        7.0    6.70  )-----------( 131      ( 16 Jan 2025 11:25 )             21.8     01-16    134[L]  |  100  |  9.5  ----------------------------<  116[H]  3.2[L]   |  24.0  |  0.73    Ca    7.5[L]      16 Jan 2025 06:10    TPro  5.0[L]  /  Alb  2.1[L]  /  TBili  0.8  /  DBili  0.3  /  AST  17  /  ALT  13  /  AlkPhos  127[H]  01-16          Urinalysis Basic - ( 16 Jan 2025 06:10 )    Color: x / Appearance: x / SG: x / pH: x  Gluc: 116 mg/dL / Ketone: x  / Bili: x / Urobili: x   Blood: x / Protein: x / Nitrite: x   Leuk Esterase: x / RBC: x / WBC x   Sq Epi: x / Non Sq Epi: x / Bacteria: x          RADIOLOGY & ADDITIONAL TESTS:  Results Reviewed:

## 2025-01-17 LAB
ANION GAP SERPL CALC-SCNC: 10 MMOL/L — SIGNIFICANT CHANGE UP (ref 5–17)
BUN SERPL-MCNC: 8.7 MG/DL — SIGNIFICANT CHANGE UP (ref 8–20)
CALCIUM SERPL-MCNC: 7.3 MG/DL — LOW (ref 8.4–10.5)
CHLORIDE SERPL-SCNC: 104 MMOL/L — SIGNIFICANT CHANGE UP (ref 96–108)
CO2 SERPL-SCNC: 26 MMOL/L — SIGNIFICANT CHANGE UP (ref 22–29)
CREAT SERPL-MCNC: 0.7 MG/DL — SIGNIFICANT CHANGE UP (ref 0.5–1.3)
EGFR: 113 ML/MIN/1.73M2 — SIGNIFICANT CHANGE UP
GLUCOSE BLDC GLUCOMTR-MCNC: 162 MG/DL — HIGH (ref 70–99)
GLUCOSE BLDC GLUCOMTR-MCNC: 168 MG/DL — HIGH (ref 70–99)
GLUCOSE BLDC GLUCOMTR-MCNC: 209 MG/DL — HIGH (ref 70–99)
GLUCOSE BLDC GLUCOMTR-MCNC: 98 MG/DL — SIGNIFICANT CHANGE UP (ref 70–99)
GLUCOSE SERPL-MCNC: 163 MG/DL — HIGH (ref 70–99)
HCT VFR BLD CALC: 20.7 % — CRITICAL LOW (ref 34.5–45)
HCT VFR BLD CALC: 28 % — LOW (ref 34.5–45)
HGB BLD-MCNC: 6.4 G/DL — CRITICAL LOW (ref 11.5–15.5)
HGB BLD-MCNC: 8.6 G/DL — LOW (ref 11.5–15.5)
LACTATE SERPL-SCNC: 2.5 MMOL/L — HIGH (ref 0.5–2)
LACTATE SERPL-SCNC: 2.9 MMOL/L — HIGH (ref 0.5–2)
LACTATE SERPL-SCNC: 3.5 MMOL/L — HIGH (ref 0.5–2)
MAGNESIUM SERPL-MCNC: 1.5 MG/DL — LOW (ref 1.8–2.6)
MAGNESIUM SERPL-MCNC: 2.8 MG/DL — HIGH (ref 1.8–2.6)
MCHC RBC-ENTMCNC: 28.5 PG — SIGNIFICANT CHANGE UP (ref 27–34)
MCHC RBC-ENTMCNC: 29 PG — SIGNIFICANT CHANGE UP (ref 27–34)
MCHC RBC-ENTMCNC: 30.7 G/DL — LOW (ref 32–36)
MCHC RBC-ENTMCNC: 30.9 G/DL — LOW (ref 32–36)
MCV RBC AUTO: 92.7 FL — SIGNIFICANT CHANGE UP (ref 80–100)
MCV RBC AUTO: 93.7 FL — SIGNIFICANT CHANGE UP (ref 80–100)
PHOSPHATE SERPL-MCNC: 4.2 MG/DL — SIGNIFICANT CHANGE UP (ref 2.4–4.7)
PLATELET # BLD AUTO: 130 K/UL — LOW (ref 150–400)
PLATELET # BLD AUTO: 132 K/UL — LOW (ref 150–400)
POTASSIUM SERPL-MCNC: 2.9 MMOL/L — CRITICAL LOW (ref 3.5–5.3)
POTASSIUM SERPL-MCNC: 4.4 MMOL/L — SIGNIFICANT CHANGE UP (ref 3.5–5.3)
POTASSIUM SERPL-SCNC: 2.9 MMOL/L — CRITICAL LOW (ref 3.5–5.3)
POTASSIUM SERPL-SCNC: 4.4 MMOL/L — SIGNIFICANT CHANGE UP (ref 3.5–5.3)
RBC # BLD: 2.21 M/UL — LOW (ref 3.8–5.2)
RBC # BLD: 3.02 M/UL — LOW (ref 3.8–5.2)
RBC # FLD: 23.1 % — HIGH (ref 10.3–14.5)
RBC # FLD: 24.9 % — HIGH (ref 10.3–14.5)
SODIUM SERPL-SCNC: 139 MMOL/L — SIGNIFICANT CHANGE UP (ref 135–145)
WBC # BLD: 10.49 K/UL — SIGNIFICANT CHANGE UP (ref 3.8–10.5)
WBC # BLD: 9.75 K/UL — SIGNIFICANT CHANGE UP (ref 3.8–10.5)
WBC # FLD AUTO: 10.49 K/UL — SIGNIFICANT CHANGE UP (ref 3.8–10.5)
WBC # FLD AUTO: 9.75 K/UL — SIGNIFICANT CHANGE UP (ref 3.8–10.5)

## 2025-01-17 PROCEDURE — 99233 SBSQ HOSP IP/OBS HIGH 50: CPT

## 2025-01-17 PROCEDURE — G0545: CPT

## 2025-01-17 PROCEDURE — 99232 SBSQ HOSP IP/OBS MODERATE 35: CPT

## 2025-01-17 RX ORDER — DIPHENHYDRAMINE HCL 25 MG
25 CAPSULE ORAL ONCE
Refills: 0 | Status: COMPLETED | OUTPATIENT
Start: 2025-01-17 | End: 2025-01-17

## 2025-01-17 RX ORDER — POTASSIUM CHLORIDE 750 MG/1
40 TABLET, EXTENDED RELEASE ORAL EVERY 4 HOURS
Refills: 0 | Status: COMPLETED | OUTPATIENT
Start: 2025-01-17 | End: 2025-01-17

## 2025-01-17 RX ORDER — PREDNISONE 5 MG/1
20 TABLET ORAL DAILY
Refills: 0 | Status: DISCONTINUED | OUTPATIENT
Start: 2025-01-18 | End: 2025-01-25

## 2025-01-17 RX ORDER — MAGNESIUM SULFATE 0.8 MEQ/ML
2 AMPUL (ML) INJECTION ONCE
Refills: 0 | Status: COMPLETED | OUTPATIENT
Start: 2025-01-17 | End: 2025-01-17

## 2025-01-17 RX ORDER — SODIUM CHLORIDE 9 G/ML
250 INJECTION, SOLUTION INTRAVENOUS ONCE
Refills: 0 | Status: COMPLETED | OUTPATIENT
Start: 2025-01-17 | End: 2025-01-17

## 2025-01-17 RX ORDER — BACTERIOSTATIC SODIUM CHLORIDE 0.9 %
500 VIAL (ML) INJECTION ONCE
Refills: 0 | Status: COMPLETED | OUTPATIENT
Start: 2025-01-17 | End: 2025-01-17

## 2025-01-17 RX ORDER — POTASSIUM CHLORIDE 750 MG/1
10 TABLET, EXTENDED RELEASE ORAL ONCE
Refills: 0 | Status: COMPLETED | OUTPATIENT
Start: 2025-01-17 | End: 2025-01-17

## 2025-01-17 RX ORDER — DEXTROSE MONOHYDRATE, SODIUM CHLORIDE, AND POTASSIUM CHLORIDE 50; 2.25; 2.24 G/1000ML; G/1000ML; G/1000ML
1000 INJECTION, SOLUTION INTRAVENOUS
Refills: 0 | Status: DISCONTINUED | OUTPATIENT
Start: 2025-01-17 | End: 2025-01-28

## 2025-01-17 RX ADMIN — AMINOCAPROIC ACID 4 GRAM(S): 1000 TABLET ORAL at 16:43

## 2025-01-17 RX ADMIN — Medication 1 APPLICATION(S): at 05:40

## 2025-01-17 RX ADMIN — DEXTROSE MONOHYDRATE, SODIUM CHLORIDE, AND POTASSIUM CHLORIDE 100 MILLILITER(S): 50; 2.25; 2.24 INJECTION, SOLUTION INTRAVENOUS at 21:43

## 2025-01-17 RX ADMIN — HYDROMORPHONE HYDROCHLORIDE 30 MILLILITER(S): 4 INJECTION, SOLUTION INTRAMUSCULAR; INTRAVENOUS; SUBCUTANEOUS at 03:07

## 2025-01-17 RX ADMIN — NAFCILLIN INJECTION 200 GRAM(S): 2 POWDER, FOR SOLUTION INTRAMUSCULAR; INTRAMUSCULAR; INTRAVENOUS at 05:40

## 2025-01-17 RX ADMIN — Medication 1000 MILLILITER(S): at 07:32

## 2025-01-17 RX ADMIN — HYDROMORPHONE HYDROCHLORIDE 30 MILLILITER(S): 4 INJECTION, SOLUTION INTRAMUSCULAR; INTRAVENOUS; SUBCUTANEOUS at 19:26

## 2025-01-17 RX ADMIN — PREGABALIN CAPSULES, CV 200 MILLIGRAM(S): 225 CAPSULE ORAL at 05:40

## 2025-01-17 RX ADMIN — POTASSIUM CHLORIDE 100 MILLIEQUIVALENT(S): 750 TABLET, EXTENDED RELEASE ORAL at 08:36

## 2025-01-17 RX ADMIN — Medication 1.25 MILLIGRAM(S): at 18:18

## 2025-01-17 RX ADMIN — Medication 500 MILLILITER(S): at 22:36

## 2025-01-17 RX ADMIN — Medication 1: at 09:30

## 2025-01-17 RX ADMIN — Medication 2: at 12:08

## 2025-01-17 RX ADMIN — OCTREOTIDE ACETATE 100 MICROGRAM(S): 1000 INJECTION INTRAVENOUS; SUBCUTANEOUS at 17:39

## 2025-01-17 RX ADMIN — DEXTROMETHORPHAN HBR AND GUAIFENESIN ORAL SOLUTION 10 MILLILITER(S): 10; 100 LIQUID ORAL at 10:34

## 2025-01-17 RX ADMIN — Medication 4 MILLILITER(S): at 08:12

## 2025-01-17 RX ADMIN — METHADONE HYDROCHLORIDE 5 MILLIGRAM(S): 5 SOLUTION ORAL at 13:34

## 2025-01-17 RX ADMIN — Medication 1.25 MILLIGRAM(S): at 08:12

## 2025-01-17 RX ADMIN — AMINOCAPROIC ACID 4 GRAM(S): 1000 TABLET ORAL at 21:43

## 2025-01-17 RX ADMIN — METHADONE HYDROCHLORIDE 5 MILLIGRAM(S): 5 SOLUTION ORAL at 05:40

## 2025-01-17 RX ADMIN — Medication 25 GRAM(S): at 12:33

## 2025-01-17 RX ADMIN — SODIUM CHLORIDE 1000 MILLILITER(S): 9 INJECTION, SOLUTION INTRAVENOUS at 13:29

## 2025-01-17 RX ADMIN — HYDROMORPHONE HYDROCHLORIDE 30 MILLILITER(S): 4 INJECTION, SOLUTION INTRAMUSCULAR; INTRAVENOUS; SUBCUTANEOUS at 07:55

## 2025-01-17 RX ADMIN — OCTREOTIDE ACETATE 100 MICROGRAM(S): 1000 INJECTION INTRAVENOUS; SUBCUTANEOUS at 05:56

## 2025-01-17 RX ADMIN — PREDNISONE 30 MILLIGRAM(S): 5 TABLET ORAL at 05:40

## 2025-01-17 RX ADMIN — PANTOPRAZOLE 40 MILLIGRAM(S): 20 TABLET, DELAYED RELEASE ORAL at 05:40

## 2025-01-17 RX ADMIN — POTASSIUM CHLORIDE 40 MILLIEQUIVALENT(S): 750 TABLET, EXTENDED RELEASE ORAL at 13:34

## 2025-01-17 RX ADMIN — AMINOCAPROIC ACID 4 GRAM(S): 1000 TABLET ORAL at 02:28

## 2025-01-17 RX ADMIN — NAFCILLIN INJECTION 200 GRAM(S): 2 POWDER, FOR SOLUTION INTRAMUSCULAR; INTRAMUSCULAR; INTRAVENOUS at 16:45

## 2025-01-17 RX ADMIN — NAFCILLIN INJECTION 200 GRAM(S): 2 POWDER, FOR SOLUTION INTRAMUSCULAR; INTRAMUSCULAR; INTRAVENOUS at 21:44

## 2025-01-17 RX ADMIN — PREGABALIN CAPSULES, CV 200 MILLIGRAM(S): 225 CAPSULE ORAL at 13:34

## 2025-01-17 RX ADMIN — AMINOCAPROIC ACID 4 GRAM(S): 1000 TABLET ORAL at 10:17

## 2025-01-17 RX ADMIN — Medication 150 GRAM(S): at 17:40

## 2025-01-17 RX ADMIN — ANTISEPTIC SURGICAL SCRUB 1 APPLICATION(S): 0.04 SOLUTION TOPICAL at 12:08

## 2025-01-17 RX ADMIN — PANTOPRAZOLE 40 MILLIGRAM(S): 20 TABLET, DELAYED RELEASE ORAL at 17:40

## 2025-01-17 RX ADMIN — Medication 4 MILLILITER(S): at 03:17

## 2025-01-17 RX ADMIN — Medication 1 APPLICATION(S): at 17:55

## 2025-01-17 RX ADMIN — ALBENDAZOLE 400 MILLIGRAM(S): 200 TABLET, FILM COATED ORAL at 12:06

## 2025-01-17 RX ADMIN — POTASSIUM CHLORIDE 40 MILLIEQUIVALENT(S): 750 TABLET, EXTENDED RELEASE ORAL at 09:30

## 2025-01-17 RX ADMIN — PREGABALIN CAPSULES, CV 200 MILLIGRAM(S): 225 CAPSULE ORAL at 21:42

## 2025-01-17 RX ADMIN — Medication 25 MILLIGRAM(S): at 22:36

## 2025-01-17 RX ADMIN — Medication 100 MILLILITER(S): at 08:41

## 2025-01-17 RX ADMIN — NAFCILLIN INJECTION 200 GRAM(S): 2 POWDER, FOR SOLUTION INTRAMUSCULAR; INTRAMUSCULAR; INTRAVENOUS at 00:59

## 2025-01-17 RX ADMIN — ACETAMINOPHEN, DIPHENHYDRAMINE HCL, PHENYLEPHRINE HCL 3 MILLIGRAM(S): 325; 25; 5 TABLET ORAL at 21:42

## 2025-01-17 RX ADMIN — Medication 100 MILLIGRAM(S): at 10:21

## 2025-01-17 RX ADMIN — NAFCILLIN INJECTION 200 GRAM(S): 2 POWDER, FOR SOLUTION INTRAMUSCULAR; INTRAMUSCULAR; INTRAVENOUS at 12:25

## 2025-01-17 RX ADMIN — DEXTROMETHORPHAN HBR AND GUAIFENESIN ORAL SOLUTION 10 MILLILITER(S): 10; 100 LIQUID ORAL at 16:46

## 2025-01-17 RX ADMIN — Medication 60 MILLILITER(S): at 05:55

## 2025-01-17 RX ADMIN — Medication 1000 MILLILITER(S): at 16:47

## 2025-01-17 RX ADMIN — Medication 4 MILLILITER(S): at 18:17

## 2025-01-17 RX ADMIN — Medication 1.25 MILLIGRAM(S): at 03:14

## 2025-01-17 NOTE — PROVIDER CONTACT NOTE (CRITICAL VALUE NOTIFICATION) - PERSON GIVING RESULT:
Jocy Boss from lab
monserrat piña
Lab [facility]
[place] from Lab
Lab/ [place]
Sweetie brito
Lab - [place]
Michael Muhammad from lab
Wojciech Barajas, Lab

## 2025-01-17 NOTE — PROGRESS NOTE ADULT - ASSESSMENT
39y  Female with h/o metastatic breast cancer ER/[place] Neg, HER-2 + on chemotherapy (last dose 12/26/24) (mets to lung, liver, spleen, spine, bone and brain), gastritis w/ intermittent episodes of dark stool (follows w/ Dr. Rosado GI and is on PPI and octreotide daily). Patient presented 1/5 with c/o worsening SOB.  She was seen in ED 1/2/25 for for weakness and SOB at which time she was found to have Hb of 5 requiring PRBC transfusion and positive for Flu A and started on tamiflu and was discharged from the ED on 1/3/25.  Her respiratory status has worsened since then w/ productive cough associated with body aches and chills. Admitted for sepsis ,acute hypoxic respiratory failure with flu A. Currently on HFNC for worsening respiratory status.S/P  Tamiflu. Vancomycn and Zosyn was added. Blood cultures with MSSA.  Cardiology consultation requested for evaluation of endocarditis.   Blood cultures 1/5, 1/7, 1/9 reporting MSSA , Repeat blood cultures ngtd on 1/11/25, Sputum Cx 1/6 Staphylococcus aureus. MARYANN hold for active gib requring transfusion. Pt had large BM likley diverticular bleed. Had recent scopy. Gi following. ct abd/pelvis w/iv contrast no active bleed (1/13). Urine Cx 1/5 reporting 10k - 49k Escherichia coli  of ? significance  since UA not concerning for UTI.  RVP/COVID 19 PCR + Influenza A.GI PCR  positive norovirus,Giadia.        Hypotension- likelyf rom volume loss   Nororvirus,Giadia gastroenteritis  -bolus 500 cc.then increase ivf 100 cc/hr. pending cbc  -check LA. Low grade fever. blood c/s if tem 100.4  -GI PCR  positive norovirus,Giadia  -Albendazole per ID  -BMP .Replaced lytes    Acute blood loss anemia   GIB  -hb 7.1.last transfuse 1u prbc 1/14  -s/p multiple transfusion   -CTA abd/pelvis  no active bleed   -PPI/OCTRIO  -GI eval with no plan for scopes 2/2 recently done.  Still with bleeding.  reed GI history, ? GAVE disease per GI notes and prior EGD . H as had multiple EGD/colonoscopies in past as well.   -hem/onc following  hem/onc started Amicar on1/16      Thrombocytopenia  likely from chemo/sepsis  -will monitor cbc        Sepsis 2/2 Flu A w/ superimposed multifocal PNA  Acute hypoxic respiratory failure 2/2 Flu A w/ superimposed multifocal PNA   - on NC, comfortable  - persistently positive RVP for flu A  - s/p  tamiflu through 1/10  - repeat CT 1/8 shows improved pleural effusion but worsening GGOs, unclear if infectious or malignant etiology  - repeat CT 1/9 with similar findings  - repeat CT next week to evaluate for improvement of infiltrates  - po steroid tapering dose  - c/w abx per ID recs  - sputum clx growing moderate staph aureus   - TTE EF 65-70%, no WMA    MSSA bacteremia  - blood clx positive 1/5, repeat bl c/s (1/11) ngtd  - ID following  - c/w nafcillin  per id  - MARYANN on hold for gib. f/u cardio rec  - per ID, may need chemo port removal, will follow up  -f/u ID REC        Chronic normocytic anemia 2/2 metastatic breast cancer on chemo and 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   Thrombocytopenia likely 2/2 sepsis   Breast cancer ER/[place] Neg, HER-2 pos on chemotherapy w/ mets to lung, liver, spleen, spine, bone and brain  - Baseline Hb ranges 8-9    - s/p prbc transfusion on 1/2, 1/8 and 1/9,1/11,1/13,1/14  - trend CBC, transfuse for hgb <7  - c/w home sub q octreotide and IV protonix  - Monitor CBC and transfuse for Hb<7 and plts<20K in setting of sepsis   - [facility] following  - monitor on tele and     Breast cancer ER/[place] Neg, HER-2 pos on chemotherapy w/ mets to lung, liver, spleen, spine, bone and brain  - c/w pregabalin, methylphenidate (confirmed on ISTOP Reference #: [id number])  - Reports being on methadone for pain but last 30 day script on istop dispensed 09/19/24  - Is on po dilaudid 4mg prn w/ last script dispensed 12/04/24   - dc fentanyl drip, started dilaudid pca today. methadone  - pain management following, revert back to PO Dilaudid regimen on d/c .will f/u rec    dvt ppx scd'd  disposition: pending. improvement of bloody bm/anemia, pain control.  raul pt     39y  Female with h/o metastatic breast cancer ER/[place] Neg, HER-2 + on chemotherapy (last dose 12/26/24) (mets to lung, liver, spleen, spine, bone and brain), gastritis w/ intermittent episodes of dark stool (follows w/ Dr. Rosado GI and is on PPI and octreotide daily). Patient presented 1/5 with c/o worsening SOB.  She was seen in ED 1/2/25 for for weakness and SOB at which time she was found to have Hb of 5 requiring PRBC transfusion and positive for Flu A and started on tamiflu and was discharged from the ED on 1/3/25.  Her respiratory status has worsened since then w/ productive cough associated with body aches and chills. Admitted for sepsis ,acute hypoxic respiratory failure with flu A. Currently on HFNC for worsening respiratory status.S/P  Tamiflu. Vancomycn and Zosyn was added. Blood cultures with MSSA.  Cardiology consultation requested for evaluation of endocarditis.   Blood cultures 1/5, 1/7, 1/9 reporting MSSA , Repeat blood cultures ngtd on 1/11/25, Sputum Cx 1/6 Staphylococcus aureus. MARYANN hold for active gib requring transfusion. Pt had large BM likley diverticular bleed. Had recent scopy. Gi following. ct abd/pelvis w/iv contrast no active bleed (1/13). Urine Cx 1/5 reporting 10k - 49k Escherichia coli  of ? significance  since UA not concerning for UTI.  RVP/COVID 19 PCR + Influenza A.GI PCR  positive norovirus,Giadia.        Hypotension,lactic acidosis - likely rom volume loss   Nororvirus,Giadia gastroenteritis  Hypokalemia     -bolus 500 cc.then increase ivf 100 cc/hr. repeat LA. BLOOD C/S  -. Low grade fever.  -GI PCR  positive norovirus,Giadia  -Albendazole per ID  -BMP .Replaced lytes. recheck k. add k with ivf   -F/U ID REC    Acute blood loss anemia   GIB  -hb 6.4. WILL TRANSFUSE 1 U PRBC. .last transfuse 1u prbc 1/14  -s/p multiple transfusion   -CTA abd/pelvis  no active bleed   -PPI/OCTRIO  -GI eval with no plan for scopes 2/2 recently done.  Still with bleeding.  reed GI history, ? GAVE disease per GI notes and prior EGD . H as had multiple EGD/colonoscopies in past as well.   -hem/onc following  -hem/onc started Amicar on1/16  -CBC Repeat  POST TRANSFUSION      Thrombocytopenia  likely from chemo/sepsis  -will monitor cbc        Sepsis 2/2 Flu A w/ superimposed multifocal PNA  Acute hypoxic respiratory failure 2/2 Flu A w/ superimposed multifocal PNA   - on NC, comfortable  - persistently positive RVP for flu A  - s/p  tamiflu through 1/10  - repeat CT 1/8 shows improved pleural effusion but worsening GGOs, unclear if infectious or malignant etiology  - repeat CT 1/9 with similar findings  - repeat CT next week to evaluate for improvement of infiltrates  - po steroid tapering dose  - c/w abx per ID recs  - sputum clx growing moderate staph aureus   - TTE EF 65-70%, no WMA    MSSA bacteremia  - blood clx positive 1/5, repeat bl c/s (1/11) ngtd  - ID following  - c/w nafcillin  per id  - MARYANN on hold for gib. f/u cardio rec  - per ID, may need chemo port removal, will follow up  -f/u ID REC        Chronic normocytic anemia 2/2 metastatic breast cancer on chemo and 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   Thrombocytopenia likely 2/2 sepsis   Breast cancer ER/AK Neg, HER-2 pos on chemotherapy w/ mets to lung, liver, spleen, spine, bone and brain  - Baseline Hb ranges 8-9    - s/p prbc transfusion on 1/2, 1/8 and 1/9,1/11,1/13,1/14  - trend CBC, transfuse for hgb <7  - c/w home sub q octreotide and IV protonix  - Monitor CBC and transfuse for Hb<7 and plts<20K in setting of sepsis   - [facility] following  - monitor on tele and     Breast cancer ER/AK Neg, HER-2 pos on chemotherapy w/ mets to lung, liver, spleen, spine, bone and brain  - c/w pregabalin, methylphenidate (confirmed on ISTOP Reference #: [id number])  - Reports being on methadone for pain but last 30 day script on istop dispensed 09/19/24  - Is on po dilaudid 4mg prn w/ last script dispensed 12/04/24   - dc fentanyl drip, started dilaudid pca today. methadone  - pain management following, revert back to PO Dilaudid regimen on d/c .will f/u rec    dvt ppx scd'd  disposition: pending. improvement of bloody bm/anemia, pain control.  plan of care raul pt and father at bedside  raul rn

## 2025-01-17 NOTE — PROVIDER CONTACT NOTE (CRITICAL VALUE NOTIFICATION) - NAME OF MD/NP/PA/DO NOTIFIED:
Dr. Bryant
Dr. Bryant
Jumana Bales
HUA Lopez made aware.
Dr. Bryant
Desiree Swain
Medicine JOSE Denney
NP Jessica
Kristin GARCIA
Provider [place]

## 2025-01-17 NOTE — PROGRESS NOTE ADULT - ASSESSMENT
39y  Female with h/o metastatic breast cancer ER/[place] Neg, HER-2 + on chemotherapy (last dose 12/26/24) (mets to lung, liver, spleen, spine, bone and brain), gastritis w/ intermittent episodes of dark stool (follows w/ Dr. Rosado GI and is on PPI and octreotide daily). Patient presented 1/5 with c/o worsening SOB.  She was seen in ED 1/2/25 for for weakness and SOB at which time she was found to have Hb of 5 requiring PRBC transfusion and positive for Flu A and started on tamiflu and was discharged from the ED on 1/3/25.  Her respiratory status has worsened since then w/ productive cough associated with body aches and chills.  Denies sick contacts but has had many frequent hospital visits. Patient has been afebrile, no leukocytosis. Was placed on BIPAP for Worsening respiratory status. continued on Tamiflu. Vancomycn and Zosyn was added. Blood cultures with MSSA. ID input requested.       MSSA bacteremia   Staphylococcus aureus PNA   Influenza A   metastatic breast cancer ER/NE Neg, HER-2 + on chemotherapy   Port in place s/p explant 1/10/25  Stool PCR + Norovirus and Giardia       - Blood cultures 1/5, 1/7, 1/9 reporting MSSA    - Repeat blood cultures 1/10 and 1/11/25 no growth   - Sputum Cx 1/6 Staphylococcus aureus  - Urine Cx 1/5 reporting 10k - 49k Escherichia coli  of ? significance since UA not concerning for UTI   - Stool PCR + Norovirus and Giardia   - RVP/COVID 19 PCR + Influenza A  - S/P Port removal 1/10/25  - CTA Chest reporting No acute pulmonary embolism. Wide spread patchy airspace opacities bilaterally, increased since the prior exam.  - US RUQ reporting No evidence of acute cholecystitis or biliary ductal dilatation.  - Procalcitonin level 2.32 --> 1.37  - TTE 1/6 with no veg  - Called cardiology consult for MARYANN if possible   - GI eval noted, no plan for EGD   - s/p Port removal 1/10/25  - Completed oseltamivir  1/10/25  - Continue Nafcillin 2gm IV [id number]  - For Norovirus, continue hydration and supportive care  - For Giardia, confirm the Giardia stool Ag  - Continue Albendazole 400mg PO q 24hours x 5 days in the meantime for Giardia   - Hold off on PICC/Midline for now unless needed for reasons other than infectious diseases  - Follow up cultures  - Trend Fever  - Trend WBC      Thank you for allowing me to participate in the care of your patient.   Will Follow    Discussed treatment plan with:  Jessica SEQUEIRA and Clinical pharmacy

## 2025-01-17 NOTE — PROGRESS NOTE ADULT - ASSESSMENT
40 yo F with h/o metastatic breast cancer ER/[place] Neg, HER-2 + on chemotherapy (last dose 12/26/24) (mets to lung, liver, spleen, spine, bone and brain).     Pain management following for pain control.    Recommendations:   - Continue Dilaudid PCA 0/0.35/8/6   - Continue home Methadone 5mg po bid at 0600 and 1400   - Continue home Methadone 10mg po qHS   - Continue home Lyrica 200mg po q8h    When due for discharge:   - DC Dilaudid PCA   - Recommend starting Dilaudid PO 4mg/8mg [id number] PRN mod/severe pain        Will continue to follow. Please call pain management with any questions [phone]

## 2025-01-17 NOTE — PROGRESS NOTE ADULT - SUBJECTIVE AND OBJECTIVE BOX
39y  Female with h/o metastatic breast cancer ER/[place] Neg, HER-2 + on chemotherapy (last dose 12/26/24) (mets to lung, liver, spleen, spine, bone and brain), gastritis w/ intermittent episodes of dark stool (follows w/ Dr. Rosado GI and is on PPI and octreotide daily). Patient presented 1/5 with c/o worsening SOB.  She was seen in ED 1/2/25 for for weakness and SOB at which time she was found to have Hb of 5 requiring PRBC transfusion and positive for Flu A and started on tamiflu and was discharged from the ED on 1/3/25.  Her respiratory status has worsened since then w/ productive cough associated with body aches and chills.  Denies sick contacts but has had many frequent hospital visits. Patient has been afebrile, no leukocytosis. Was placed on BIPAP for Worsening respiratory status. continued on Tamiflu. Vancomycn and Zosyn was added. Blood cultures with MSSA.     PAST MEDICAL & SURGICAL HISTORY:  H/O compression fracture of spine  Anxiety  Metastatic breast cancer  H/O pleural effusion  Pericardial effusion  S/P tonsillectomy  H/O chest tube placement  12/23/21  S/P pericardiocentesis  12/28/21    Allergies  pertuzumab (Other (Severe))  Perjeta (Other (Severe))    MEDICATIONS  (STANDING):  acetylcysteine 10%  Inhalation 4 milliLiter(s) Inhalation every 6 hours  albuterol/ipratropium for Nebulization. 3 milliLiter(s) Nebulizer once  chlorhexidine 2% Cloths 1 Application(s) Topical daily  dextrose 5%. 1000 milliLiter(s) (50 mL/Hr) IV Continuous <Continuous>  dextrose 5%. 1000 milliLiter(s) (100 mL/Hr) IV Continuous <Continuous>  dextrose 50% Injectable 25 Gram(s) IV Push once  dextrose 50% Injectable 12.5 Gram(s) IV Push once  dextrose 50% Injectable 25 Gram(s) IV Push once  furosemide   Injectable 40 milliGRAM(s) IV Push daily  glucagon  Injectable 1 milliGRAM(s) IntraMuscular once  HYDROmorphone PCA (1 mG/mL) 30 milliLiter(s) PCA Continuous PCA Continuous  insulin lispro (ADMELOG) corrective regimen sliding scale   SubCutaneous three times a day before meals  levalbuterol Inhalation 1.25 milliGRAM(s) Inhalation every 6 hours  methadone    Tablet 10 milliGRAM(s) Oral at bedtime  methadone    Tablet 5 milliGRAM(s) Oral <User Schedule>  methylPREDNISolone sodium succinate Injectable 40 milliGRAM(s) IV Push every 24 hours  nafcillin  IVPB 2 Gram(s) IV Intermittent every 4 hours  octreotide  Injectable 100 MICROGram(s) SubCutaneous two times a day  pantoprazole  Injectable 40 milliGRAM(s) IV Push every 12 hours  pregabalin 200 milliGRAM(s) Oral every 8 hours    MEDICATIONS  (PRN):  acetaminophen     Tablet .. 650 milliGRAM(s) Oral every 6 hours PRN Temp greater or equal to 38C (100.4F), Mild Pain (1 - 3)  aluminum hydroxide/magnesium hydroxide/simethicone Suspension 30 milliLiter(s) Oral every 4 hours PRN Dyspepsia  benzonatate 100 milliGRAM(s) Oral every 8 hours PRN Cough  dextrose Oral Gel 15 Gram(s) Oral once PRN Blood Glucose LESS THAN 70 milliGRAM(s)/deciliter  guaifenesin/dextromethorphan Oral Liquid 10 milliLiter(s) Oral every 4 hours PRN Cough  melatonin 3 milliGRAM(s) Oral at bedtime PRN Insomnia  naloxone Injectable 0.1 milliGRAM(s) IV Push every 3 minutes PRN For ANY of the following changes in patient status:  A. RR LESS THAN 10 breaths per minute, B. Oxygen saturation LESS THAN 90%, C. Sedation score of 6  ondansetron Injectable 4 milliGRAM(s) IV Push every 8 hours PRN Nausea and/or Vomiting    Vital Signs Last 24 Hrs  T(C): 36.6 (17 Jan 2025 07:33), Max: 37.9 (16 Jan 2025 22:00)  T(F): 97.8 (17 Jan 2025 07:33), Max: 100.3 (17 Jan 2025 00:04)  HR: 74 (17 Jan 2025 07:00) (72 - 118)  BP: 78/50 (17 Jan 2025 07:00) (78/50 - 118/57)  BP(mean): 62 (17 Jan 2025 06:00) (62 - 83)  RR: 18 (17 Jan 2025 06:00) (17 - 18)  SpO2: 98% (17 Jan 2025 06:00) (96% - 100%)    Parameters below as of 17 Jan 2025 06:00  Patient On (Oxygen Delivery Method): nasal cannula  O2 Flow (L/min): 2      PE:  NAD  On O2 NC  bilateral rales  abd soft, nd, nt  a/o x 3      CBC                          6.4    9.75  )-----------( 130      ( 17 Jan 2025 06:45 )             20.7                             7.2    6.88  )-----------( 151      ( 16 Jan 2025 06:10 )             23.2                           7.9    10.16 )-----------( 123      ( 15 Cristhian 2025 06:10 )             25.4           Chem:       01-17    139  |  104  |  8.7  ----------------------------<  163[H]  2.9[LL]   |  26.0  |  0.70    Ca    7.3[L]      17 Jan 2025 06:45  Phos  4.2     01-17  Mg     1.5     01-17    TPro  5.0[L]  /  Alb  2.1[L]  /  TBili  0.8  /  DBili  0.3  /  AST  17  /  ALT  13  /  AlkPhos  127[H]  01-16      01-15    138  |  102  |  12.3  ----------------------------<  135[H]  3.6   |  25.0  |  0.67    Ca    8.1[L]      15 Cristhian 2025 06:10      01-14    141  |  103  |  13.4  ----------------------------<  127[H]  3.4[L]   |  30.0[H]  |  0.54    Ca    8.1[L]      14 Jan 2025 04:48        01-13    141  |  103  |  20.3[H]  ----------------------------<  137[H]  3.8   |  27.0  |  0.47[L]    Ca    8.2[L]      13 Jan 2025 06:10        01-09    138  |  100  |  21.3[H]  ----------------------------<  152[H]  3.5   |  29.0  |  0.54    Ca    7.0[L]      09 Jan 2025 04:57  Mg     1.9     01-08    TPro  5.0[L]  /  Alb  2.5[L]  /  TBili  1.8  /  DBili  1.3[H]  /  AST  24  /  ALT  23  /  AlkPhos  238[H]  01-09 01-08    140  |  103  |  17.8  ----------------------------<  112[H]  3.5   |  27.0  |  0.56    Ca    6.8[L]      08 Jan 2025 06:20  Mg     1.9     01-08    TPro  5.4[L]  /  Alb  2.5[L]  /  TBili  1.9  /  DBili  1.3[H]  /  AST  24  /  ALT  25  /  AlkPhos  240[H]  01-08          ( 01-07-25 @ 06:15 )    139  |  106  |  17.5  ----------------------------<  394[H]  3.3[L]   |  20.0[L]  |  0.61    Liver Functions: ( 01-07-25 @ 06:15 )  Alb: 2.6 g/dL / Pro: 5.5 g/dL / ALK PHOS: 228 U/L / ALT: 22 U/L / AST: 16 U/L / GGT: x                   39y  Female with h/o metastatic breast cancer ER/[place] Neg, HER-2 + on chemotherapy (last dose 12/26/24) (mets to lung, liver, spleen, spine, bone and brain), gastritis w/ intermittent episodes of dark stool (follows w/ Dr. Rosado GI and is on PPI and octreotide daily). Patient presented 1/5 with c/o worsening SOB.  She was seen in ED 1/2/25 for for weakness and SOB at which time she was found to have Hb of 5 requiring PRBC transfusion and positive for Flu A and started on tamiflu and was discharged from the ED on 1/3/25.  Her respiratory status has worsened since then w/ productive cough associated with body aches and chills.  Denies sick contacts but has had many frequent hospital visits. Patient has been afebrile, no leukocytosis. Was placed on BIPAP for Worsening respiratory status. continued on Tamiflu. Vancomycn and Zosyn was added. Blood cultures with MSSA.     1/17/25 Father concerned GI has not been back to see pt and she is being transfused almost daily     PAST MEDICAL & SURGICAL HISTORY:  H/O compression fracture of spine  Anxiety  Metastatic breast cancer  H/O pleural effusion  Pericardial effusion  S/P tonsillectomy  H/O chest tube placement  12/23/21  S/P pericardiocentesis  12/28/21    Allergies  pertuzumab (Other (Severe))  Perjeta (Other (Severe))    MEDICATIONS  (STANDING):  acetylcysteine 10%  Inhalation 4 milliLiter(s) Inhalation every 6 hours  albuterol/ipratropium for Nebulization. 3 milliLiter(s) Nebulizer once  chlorhexidine 2% Cloths 1 Application(s) Topical daily  dextrose 5%. 1000 milliLiter(s) (50 mL/Hr) IV Continuous <Continuous>  dextrose 5%. 1000 milliLiter(s) (100 mL/Hr) IV Continuous <Continuous>  dextrose 50% Injectable 25 Gram(s) IV Push once  dextrose 50% Injectable 12.5 Gram(s) IV Push once  dextrose 50% Injectable 25 Gram(s) IV Push once  furosemide   Injectable 40 milliGRAM(s) IV Push daily  glucagon  Injectable 1 milliGRAM(s) IntraMuscular once  HYDROmorphone PCA (1 mG/mL) 30 milliLiter(s) PCA Continuous PCA Continuous  insulin lispro (ADMELOG) corrective regimen sliding scale   SubCutaneous three times a day before meals  levalbuterol Inhalation 1.25 milliGRAM(s) Inhalation every 6 hours  methadone    Tablet 10 milliGRAM(s) Oral at bedtime  methadone    Tablet 5 milliGRAM(s) Oral <User Schedule>  methylPREDNISolone sodium succinate Injectable 40 milliGRAM(s) IV Push every 24 hours  nafcillin  IVPB 2 Gram(s) IV Intermittent every 4 hours  octreotide  Injectable 100 MICROGram(s) SubCutaneous two times a day  pantoprazole  Injectable 40 milliGRAM(s) IV Push every 12 hours  pregabalin 200 milliGRAM(s) Oral every 8 hours    MEDICATIONS  (PRN):  acetaminophen     Tablet .. 650 milliGRAM(s) Oral every 6 hours PRN Temp greater or equal to 38C (100.4F), Mild Pain (1 - 3)  aluminum hydroxide/magnesium hydroxide/simethicone Suspension 30 milliLiter(s) Oral every 4 hours PRN Dyspepsia  benzonatate 100 milliGRAM(s) Oral every 8 hours PRN Cough  dextrose Oral Gel 15 Gram(s) Oral once PRN Blood Glucose LESS THAN 70 milliGRAM(s)/deciliter  guaifenesin/dextromethorphan Oral Liquid 10 milliLiter(s) Oral every 4 hours PRN Cough  melatonin 3 milliGRAM(s) Oral at bedtime PRN Insomnia  naloxone Injectable 0.1 milliGRAM(s) IV Push every 3 minutes PRN For ANY of the following changes in patient status:  A. RR LESS THAN 10 breaths per minute, B. Oxygen saturation LESS THAN 90%, C. Sedation score of 6  ondansetron Injectable 4 milliGRAM(s) IV Push every 8 hours PRN Nausea and/or Vomiting    Vital Signs Last 24 Hrs  T(C): 36.6 (17 Jan 2025 07:33), Max: 37.9 (16 Jan 2025 22:00)  T(F): 97.8 (17 Jan 2025 07:33), Max: 100.3 (17 Jan 2025 00:04)  HR: 74 (17 Jan 2025 07:00) (72 - 118)  BP: 78/50 (17 Jan 2025 07:00) (78/50 - 118/57)  BP(mean): 62 (17 Jan 2025 06:00) (62 - 83)  RR: 18 (17 Jan 2025 06:00) (17 - 18)  SpO2: 98% (17 Jan 2025 06:00) (96% - 100%)    Parameters below as of 17 Jan 2025 06:00  Patient On (Oxygen Delivery Method): nasal cannula  O2 Flow (L/min): 2      PE:  NAD  On O2 NC  bilateral rales  abd soft, nd, nt  a/o x 3      CBC                          6.4    9.75  )-----------( 130      ( 17 Jan 2025 06:45 )             20.7                             7.2    6.88  )-----------( 151      ( 16 Jan 2025 06:10 )             23.2                           7.9    10.16 )-----------( 123      ( 15 Cristhian 2025 06:10 )             25.4           Chem:       01-17    139  |  104  |  8.7  ----------------------------<  163[H]  2.9[LL]   |  26.0  |  0.70    Ca    7.3[L]      17 Jan 2025 06:45  Phos  4.2     01-17  Mg     1.5     01-17    TPro  5.0[L]  /  Alb  2.1[L]  /  TBili  0.8  /  DBili  0.3  /  AST  17  /  ALT  13  /  AlkPhos  127[H]  01-16      01-15    138  |  102  |  12.3  ----------------------------<  135[H]  3.6   |  25.0  |  0.67    Ca    8.1[L]      15 Cristhian 2025 06:10      01-14    141  |  103  |  13.4  ----------------------------<  127[H]  3.4[L]   |  30.0[H]  |  0.54    Ca    8.1[L]      14 Jan 2025 04:48        01-13    141  |  103  |  20.3[H]  ----------------------------<  137[H]  3.8   |  27.0  |  0.47[L]    Ca    8.2[L]      13 Jan 2025 06:10        01-09    138  |  100  |  21.3[H]  ----------------------------<  152[H]  3.5   |  29.0  |  0.54    Ca    7.0[L]      09 Jan 2025 04:57  Mg     1.9     01-08    TPro  5.0[L]  /  Alb  2.5[L]  /  TBili  1.8  /  DBili  1.3[H]  /  AST  24  /  ALT  23  /  AlkPhos  238[H]  01-09 01-08    140  |  103  |  17.8  ----------------------------<  112[H]  3.5   |  27.0  |  0.56    Ca    6.8[L]      08 Jan 2025 06:20  Mg     1.9     01-08    TPro  5.4[L]  /  Alb  2.5[L]  /  TBili  1.9  /  DBili  1.3[H]  /  AST  24  /  ALT  25  /  AlkPhos  240[H]  01-08          ( 01-07-25 @ 06:15 )    139  |  106  |  17.5  ----------------------------<  394[H]  3.3[L]   |  20.0[L]  |  0.61    Liver Functions: ( 01-07-25 @ 06:15 )  Alb: 2.6 g/dL / Pro: 5.5 g/dL / ALK PHOS: 228 U/L / ALT: 22 U/L / AST: 16 U/L / GGT: x

## 2025-01-17 NOTE — PROGRESS NOTE ADULT - SUBJECTIVE AND OBJECTIVE BOX
Interval Hx:  Patient seen during rounds  Patient reports pain to be controlled on current medications  Patient denies sedation with medications      Analgesic Dosing for past 24 hours reviewed as below:  acetaminophen     Tablet ..   650 milliGRAM(s) Oral (01-16-25 @ 22:27)    diphenhydrAMINE   25 milliGRAM(s) Oral (01-16-25 @ 22:26)    melatonin   3 milliGRAM(s) Oral (01-16-25 @ 21:13)    methadone    Tablet   5 milliGRAM(s) Oral (01-17-25 @ 05:40)   5 milliGRAM(s) Oral (01-16-25 @ 13:28)    pregabalin   200 milliGRAM(s) Oral (01-17-25 @ 05:40)   200 milliGRAM(s) Oral (01-16-25 @ 21:10)   200 milliGRAM(s) Oral (01-16-25 @ 13:28)          T(C): 36.6 (01-17-25 @ 07:33), Max: 37.9 (01-16-25 @ 22:00)  HR: 74 (01-17-25 @ 07:00) (72 - 118)  BP: 78/50 (01-17-25 @ 07:00) (78/50 - 118/57)  RR: 18 (01-17-25 @ 06:00) (17 - 18)  SpO2: 98% (01-17-25 @ 06:00) (96% - 100%)      01-16-25 @ 07:01  -  01-17-25 @ 07:00  --------------------------------------------------------  IN: 1200 mL / OUT: 0 mL / NET: 1200 mL        acetaminophen     Tablet .. 650 milliGRAM(s) Oral every 6 hours PRN  acetylcysteine 10%  Inhalation 4 milliLiter(s) Inhalation every 6 hours  albendazole 400 milliGRAM(s) Oral daily  aluminum hydroxide/magnesium hydroxide/simethicone Suspension 30 milliLiter(s) Oral every 4 hours PRN  aminocaproic acid Tablet 4 Gram(s) Oral every 6 hours  benzonatate 100 milliGRAM(s) Oral every 8 hours PRN  chlorhexidine 2% Cloths 1 Application(s) Topical daily  dextrose 5%. 1000 milliLiter(s) IV Continuous <Continuous>  dextrose 5%. 1000 milliLiter(s) IV Continuous <Continuous>  dextrose 50% Injectable 25 Gram(s) IV Push once  dextrose 50% Injectable 12.5 Gram(s) IV Push once  dextrose 50% Injectable 25 Gram(s) IV Push once  dextrose Oral Gel 15 Gram(s) Oral once PRN  glucagon  Injectable 1 milliGRAM(s) IntraMuscular once  guaifenesin/dextromethorphan Oral Liquid 10 milliLiter(s) Oral every 4 hours PRN  hydrocortisone 2.5% Rectal Cream 1 Application(s) Rectal two times a day  HYDROmorphone PCA (1 mG/mL) 30 milliLiter(s) PCA Continuous PCA Continuous  insulin lispro (ADMELOG) corrective regimen sliding scale   SubCutaneous three times a day before meals  levalbuterol Inhalation 1.25 milliGRAM(s) Inhalation every 6 hours  melatonin 3 milliGRAM(s) Oral at bedtime PRN  methadone    Tablet 5 milliGRAM(s) Oral <User Schedule>  nafcillin  IVPB 2 Gram(s) IV Intermittent every 4 hours  naloxone Injectable 0.1 milliGRAM(s) IV Push every 3 minutes PRN  octreotide  Injectable 100 MICROGram(s) SubCutaneous two times a day  ondansetron Injectable 4 milliGRAM(s) IV Push every 8 hours PRN  pantoprazole  Injectable 40 milliGRAM(s) IV Push every 12 hours  pregabalin 200 milliGRAM(s) Oral every 8 hours  sodium chloride 0.9%. 1000 milliLiter(s) IV Continuous <Continuous>                          7.1    5.12  )-----------( 146      ( 16 Jan 2025 16:55 )             22.5     01-17    139  |  104  |  8.7  ----------------------------<  163[H]  2.9[LL]   |  26.0  |  0.70    Ca    7.3[L]      17 Jan 2025 06:45    TPro  5.0[L]  /  Alb  2.1[L]  /  TBili  0.8  /  DBili  0.3  /  AST  17  /  ALT  13  /  AlkPhos  127[H]  01-16      Urinalysis Basic - ( 17 Jan 2025 06:45 )    Color: x / Appearance: x / SG: x / pH: x  Gluc: 163 mg/dL / Ketone: x  / Bili: x / Urobili: x   Blood: x / Protein: x / Nitrite: x   Leuk Esterase: x / RBC: x / WBC x   Sq Epi: x / Non Sq Epi: x / Bacteria: x        Pain Service   [phone]

## 2025-01-17 NOTE — PROGRESS NOTE ADULT - SUBJECTIVE AND OBJECTIVE BOX
T(C): 36.5 (01-17-25 @ 04:00), Max: 37.9 (01-16-25 @ 22:00)  HR: 74 (01-17-25 @ 07:00) (72 - 118)  BP: 78/50 (01-17-25 @ 07:00) (78/50 - 118/57)  RR: 18 (01-17-25 @ 06:00) (17 - 18)  SpO2: 98% (01-17-25 @ 06:00) (96% - 100%)     Patient is a 39y old  Female who presents with a chief complaint of sepsis (17 Jan 2025 08:35)      pt is c/o diarrhea. no fever,chill,n/v, dizziness,cp,sob. .tolerating po diet  REVIEW OF SYSTEMS: All systems are reviewed and found to be negative except above    MEDICATIONS  (STANDING):  acetylcysteine 10%  Inhalation 4 milliLiter(s) Inhalation every 6 hours  albendazole 400 milliGRAM(s) Oral daily  aminocaproic acid Tablet 4 Gram(s) Oral every 6 hours  chlorhexidine 2% Cloths 1 Application(s) Topical daily  dextrose 5%. 1000 milliLiter(s) (50 mL/Hr) IV Continuous <Continuous>  dextrose 5%. 1000 milliLiter(s) (100 mL/Hr) IV Continuous <Continuous>  dextrose 50% Injectable 25 Gram(s) IV Push once  dextrose 50% Injectable 12.5 Gram(s) IV Push once  dextrose 50% Injectable 25 Gram(s) IV Push once  glucagon  Injectable 1 milliGRAM(s) IntraMuscular once  hydrocortisone 2.5% Rectal Cream 1 Application(s) Rectal two times a day  HYDROmorphone PCA (1 mG/mL) 30 milliLiter(s) PCA Continuous PCA Continuous  insulin lispro (ADMELOG) corrective regimen sliding scale   SubCutaneous three times a day before meals  lactated ringers Bolus 250 milliLiter(s) IV Bolus once  levalbuterol Inhalation 1.25 milliGRAM(s) Inhalation every 6 hours  magnesium sulfate  IVPB 2 Gram(s) IV Intermittent once  methadone    Tablet 5 milliGRAM(s) Oral <User Schedule>  nafcillin  IVPB 2 Gram(s) IV Intermittent every 4 hours  octreotide  Injectable 100 MICROGram(s) SubCutaneous two times a day  pantoprazole  Injectable 40 milliGRAM(s) IV Push every 12 hours  potassium chloride    Tablet ER 40 milliEquivalent(s) Oral every 4 hours  pregabalin 200 milliGRAM(s) Oral every 8 hours  sodium chloride 0.9% with potassium chloride 20 mEq/L 1000 milliLiter(s) (100 mL/Hr) IV Continuous <Continuous>    MEDICATIONS  (PRN):  acetaminophen     Tablet .. 650 milliGRAM(s) Oral every 6 hours PRN Temp greater or equal to 38C (100.4F), Mild Pain (1 - 3)  aluminum hydroxide/magnesium hydroxide/simethicone Suspension 30 milliLiter(s) Oral every 4 hours PRN Dyspepsia  benzonatate 100 milliGRAM(s) Oral every 8 hours PRN Cough  dextrose Oral Gel 15 Gram(s) Oral once PRN Blood Glucose LESS THAN 70 milliGRAM(s)/deciliter  guaifenesin/dextromethorphan Oral Liquid 10 milliLiter(s) Oral every 4 hours PRN Cough  melatonin 3 milliGRAM(s) Oral at bedtime PRN Insomnia  naloxone Injectable 0.1 milliGRAM(s) IV Push every 3 minutes PRN For ANY of the following changes in patient status:  A. RR LESS THAN 10 breaths per minute, B. Oxygen saturation LESS THAN 90%, C. Sedation score of 6  ondansetron Injectable 4 milliGRAM(s) IV Push every 8 hours PRN Nausea and/or Vomiting      CAPILLARY BLOOD GLUCOSE      POCT Blood Glucose.: 168 mg/dL (17 Jan 2025 09:00)  POCT Blood Glucose.: 166 mg/dL (16 Jan 2025 16:43)    I&O's Summary    16 Jan 2025 07:01  -  17 Jan 2025 07:00  --------------------------------------------------------  IN: 1800 mL / OUT: 0 mL / NET: 1800 mL    17 Jan 2025 07:01  -  17 Jan 2025 12:02  --------------------------------------------------------  IN: 783 mL / OUT: 0 mL / NET: 783 mL        PHYSICAL EXAM:  Vital Signs Last 24 Hrs  T(C): 37 (17 Jan 2025 09:30), Max: 37.9 (16 Jan 2025 22:00)  T(F): 98.6 (17 Jan 2025 09:30), Max: 100.3 (17 Jan 2025 00:04)  HR: 82 (17 Jan 2025 09:41) (70 - 118)  BP: 95/66 (17 Jan 2025 09:30) (78/50 - 116/69)  BP(mean): 76 (17 Jan 2025 09:30) (62 - 83)  RR: 17 (17 Jan 2025 09:30) (17 - 18)  SpO2: 98% (17 Jan 2025 09:41) (96% - 100%)    Parameters below as of 17 Jan 2025 09:41  Patient On (Oxygen Delivery Method): nasal cannula        CONSTITUTIONAL: NAD,  EYES: PERRLA; conjunctiva and sclera clear  ENMT: Moist oral mucosa,   RESPIRATORY: Normal respiratory effort; lungs are clear to auscultation bilaterally  CARDIOVASCULAR: Regular rate and rhythm, normal S1 and S2, no murmur   EXTS: No lower extremity edema; Peripheral pulses are 2+ bilaterally  ABDOMEN: Nontender to palpation, normoactive bowel sounds, no rebound/guarding;   MUSCLOSKELETAL:  ; no joint swelling or tenderness to palpation  PSYCH: affect appropriate  NEUROLOGY: A+O to person, place, and time; CN 2-12 are intact and symmetric; no gross sensory deficits;       LABS:                        6.4    9.75  )-----------( 130      ( 17 Jan 2025 06:45 )             20.7     01-17    139  |  104  |  8.7  ----------------------------<  163[H]  2.9[LL]   |  26.0  |  0.70    Ca    7.3[L]      17 Jan 2025 06:45  Phos  4.2     01-17  Mg     1.5     01-17    TPro  5.0[L]  /  Alb  2.1[L]  /  TBili  0.8  /  DBili  0.3  /  AST  17  /  ALT  13  /  AlkPhos  127[H]  01-16          Urinalysis Basic - ( 17 Jan 2025 06:45 )    Color: x / Appearance: x / SG: x / pH: x  Gluc: 163 mg/dL / Ketone: x  / Bili: x / Urobili: x   Blood: x / Protein: x / Nitrite: x   Leuk Esterase: x / RBC: x / WBC x   Sq Epi: x / Non Sq Epi: x / Bacteria: x          RADIOLOGY & ADDITIONAL TESTS:  Results Reviewed:                             T(C): 36.5 (01-17-25 @ 04:00), Max: 37.9 (01-16-25 @ 22:00)  HR: 74 (01-17-25 @ 07:00) (72 - 118)  BP: 78/50 (01-17-25 @ 07:00) (78/50 - 118/57)  RR: 18 (01-17-25 @ 06:00) (17 - 18)  SpO2: 98% (01-17-25 @ 06:00) (96% - 100%)

## 2025-01-17 NOTE — PROGRESS NOTE ADULT - SUBJECTIVE AND OBJECTIVE BOX
[facility]  INFECTIOUS DISEASES at [place] / [place] / [place]  =======================================================                              Salazar Toro MD                              Professor Emeritus:  Dr Warner Mcintosh MD            Diplomates American Board of Internal Medicine & Infectious Diseases                                   Tel  [phone] Fax [phone]                                  Hospital Consult line:  [phone]  =======================================================      [place] [id number]    Follow up: MSSA bacteremia    No fevers   Diarrhea improved   Hypotensive this AM     Allergies:  pertuzumab (Other (Severe))  Perjeta (Other (Severe))        REVIEW OF SYSTEMS:  CONSTITUTIONAL:  No Fever or chills  HEENT:   No diplopia or blurred vision.  No earache, sore throat or runny nose.  CARDIOVASCULAR:  No Chest Pain  RESPIRATORY:  No cough, shortness of breath  GASTROINTESTINAL:  No nausea, vomiting + diarrhea.  GENITOURINARY:  No dysuria, frequency or urgency. No Blood in urine  MUSCULOSKELETAL:  no joint aches, no muscle pain  SKIN:  No change in skin, hair or nails.  NEUROLOGIC:  No Headaches      Physical Exam:  GEN: NAD  HEENT: normocephalic and atraumatic.    NECK: Supple.   LUNGS: CTA B/L.  HEART: RRR  ABDOMEN: Soft, NT, ND.  +BS.    : No CVA tenderness  EXTREMITIES: Without  edema.  MSK: No joint swelling  NEUROLOGIC: No Focal Deficits   SKIN: No rash      Vitals:  T(F): 97.8 (17 Jan 2025 07:33), Max: 100.3 (17 Jan 2025 00:04)  HR: 74 (17 Jan 2025 07:00)  BP: 78/50 (17 Jan 2025 07:00)  RR: 18 (17 Jan 2025 06:00)  SpO2: 98% (17 Jan 2025 06:00) (96% - 100%)  temp max in last 48H T(F): , Max: 100.3 (01-17-25 @ 00:04)    Current Antibiotics:  albendazole 400 milliGRAM(s) Oral daily  nafcillin  IVPB 2 Gram(s) IV Intermittent every 4 hours    Other medications:  acetylcysteine 10%  Inhalation 4 milliLiter(s) Inhalation every 6 hours  aminocaproic acid Tablet 4 Gram(s) Oral every 6 hours  chlorhexidine 2% Cloths 1 Application(s) Topical daily  dextrose 5%. 1000 milliLiter(s) IV Continuous <Continuous>  dextrose 5%. 1000 milliLiter(s) IV Continuous <Continuous>  dextrose 50% Injectable 25 Gram(s) IV Push once  dextrose 50% Injectable 12.5 Gram(s) IV Push once  dextrose 50% Injectable 25 Gram(s) IV Push once  glucagon  Injectable 1 milliGRAM(s) IntraMuscular once  hydrocortisone 2.5% Rectal Cream 1 Application(s) Rectal two times a day  HYDROmorphone PCA (1 mG/mL) 30 milliLiter(s) PCA Continuous PCA Continuous  insulin lispro (ADMELOG) corrective regimen sliding scale   SubCutaneous three times a day before meals  levalbuterol Inhalation 1.25 milliGRAM(s) Inhalation every 6 hours  methadone    Tablet 5 milliGRAM(s) Oral <User Schedule>  octreotide  Injectable 100 MICROGram(s) SubCutaneous two times a day  pantoprazole  Injectable 40 milliGRAM(s) IV Push every 12 hours  pregabalin 200 milliGRAM(s) Oral every 8 hours  sodium chloride 0.9%. 1000 milliLiter(s) IV Continuous <Continuous>                            7.1    5.12  )-----------( 146      ( 16 Jan 2025 16:55 )             22.5     01-17    139  |  104  |  8.7  ----------------------------<  163[H]  2.9[LL]   |  26.0  |  0.70    Ca    7.3[L]      17 Jan 2025 06:45    TPro  5.0[L]  /  Alb  2.1[L]  /  TBili  0.8  /  DBili  0.3  /  AST  17  /  ALT  13  /  AlkPhos  127[H]  01-16    RECENT CULTURES:  01-11 @ 05:25 .Blood BLOOD     No growth at 5 days    01-11 @ 05:20 .Blood BLOOD     No growth at 5 days    01-10 @ 18:20 .Surgical Swab     No growth at 5 days    01-10 @ 12:20 .Blood BLOOD     No growth at 5 days    01-09 @ 04:57 .Blood BLOOD Staphylococcus aureus    Growth in aerobic bottle: Staphylococcus aureus  Growth in aerobic bottle: Gram Positive Cocci in Clusters    01-08 @ 06:20 .Blood BLOOD     Growth in aerobic and anaerobic bottles: Staphylococcus aureus  See previous culture [id number]  Growth in anaerobic bottle: Gram Positive Cocci in Clusters  Growth in aerobic bottle: Gram Positive Cocci in Clusters    01-07 @ 06:19 .Blood BLOOD     Growth in aerobic and anaerobic bottles: Staphylococcus aureus  See previous culture [id number]  Growth in aerobic bottle: Gram Positive Cocci in Clusters  Growth in anaerobic bottle: Gram Positive Cocci in Clusters    01-07 @ 06:15 .Blood BLOOD     Growth in aerobic and anaerobic bottles: Staphylococcus aureus  See previous culture [id number]  Growth in aerobic bottle: Gram Positive Cocci in Clusters  Growth in anaerobic bottle: Gram Positive Cocci in Clusters    01-06 @ 01:34 .Sputum Sputum Staphylococcus aureus    Moderate Staphylococcus aureus  Commensal sylvester consistent with body site  Moderate Squamous epithelial cells seen per low power field  Moderate polymorphonuclear leukocytes seen per low power field  Few Gram Negative Rods seen per oil power field  Few Gram positive cocci in pairs seen per oil power field  Rare Gram Positive Rods seen per oil power field  Rare Yeast like cells seen per oil power field    01-05 @ 12:34 Clean Catch Clean Catch (Midstream) Escherichia coli    10,000 - 49,000 CFU/mL Escherichia coli  <10,000 CFU/mL Normal Urogenital Sylvester    01-05 @ 10:54 .Blood BLOOD Blood Culture PCR  Staphylococcus aureus    Growth in aerobic and anaerobic bottles: Staphylococcus aureus  Direct identification is available within approximately 3-5  hours either by Blood Panel Multiplexed PCR or Direct  MALDI-TOF. Details: [email or web address]    Growth in anaerobic bottle: Gram Positive Cocci in Clusters  Growth in aerobic bottle: Gram Positive Cocci in Clusters    WBC Count: 5.12 K/uL (01-16-25 @ 16:55)  WBC Count: 6.70 K/uL (01-16-25 @ 11:25)  WBC Count: 6.88 K/uL (01-16-25 @ 06:10)  WBC Count: 10.16 K/uL (01-15-25 @ 06:10)  WBC Count: 10.83 K/uL (01-14-25 @ 20:28)  WBC Count: 8.82 K/uL (01-14-25 @ 07:31)  WBC Count: 8.77 K/uL (01-14-25 @ 04:48)  WBC Count: 16.34 K/uL (01-13-25 @ 16:31)  WBC Count: 10.58 K/uL (01-13-25 @ 06:10)  WBC Count: 12.54 K/uL (01-12-25 @ 09:00)    Creatinine: 0.70 mg/dL (01-17-25 @ 06:45)  Creatinine: 0.73 mg/dL (01-16-25 @ 06:10)  Creatinine: 0.67 mg/dL (01-15-25 @ 06:10)  Creatinine: 0.54 mg/dL (01-14-25 @ 04:48)  Creatinine: 0.47 mg/dL (01-13-25 @ 06:10)  Creatinine: 0.53 mg/dL (01-12-25 @ 09:00)    Procalcitonin: 1.37 ng/mL (01-08-25 @ 06:20)  Procalcitonin: 2.32 ng/mL (01-07-25 @ 06:15)     SARS-CoV-2: NotDetec (01-05-25 @ 10:54)  SARS-CoV-2 Result: NotDetec (01-02-25 @ 19:20)      Influenza AH3 (RapRVP): Detected (01.05.25 @ 10:54)    Urinalysis + Microscopic Examination (01.06.25 @ 03:00)    pH Urine: 6.0   Urine Appearance: Clear   Color: Yellow   Specific Gravity: 1.015   Protein, Urine: Trace mg/dL   Glucose Qualitative, Urine: Negative mg/dL   Ketone - Urine: Negative mg/dL   Blood, Urine: Small   Bilirubin: Negative   Urobilinogen: 1.0 mg/dL   Leukocyte Esterase Concentration: Small   Nitrite: Negative   White Blood Cell - Urine: 6 /HPF   Red Blood Cell - Urine: 5 /HPF   Bacteria: Occasional /HPF   Epithelial Cells: 2 /HPF      GI PCR Panel Stool (01.15.25 @ 06:15)    GI PCR Panel: Detected   Giardia lamblia: Detected   Norovirus GI/GII: Detected              < from: TTE Limited W or WO Ultrasound Enhancing Agent (01.06.25 @ 12:55) >  CONCLUSIONS:      1. Technically difficult image quality.   2. Left ventricular systolic function is normal with an ejection fraction visually estimated at 65 to 70 %.   3. Normal right ventricular cavity size, with normal wall thickness, and normal right ventricular systolic function.   4. Compared to the transthoracic echocardiogram performed on 7/21/2024, there have been no significant interval changes.    < end of copied text >

## 2025-01-17 NOTE — PROVIDER CONTACT NOTE (CRITICAL VALUE NOTIFICATION) - TEST AND RESULT REPORTED:
HBG 6.7, HCT 21.0
Hgb 6.9
blood cultures 1/9, aerobic bottle GPC Clusters
Hgn 5.2, hct 16.1
+ blood cx anerobic bottle gram + cocci in clusers
Hgb 6.5; Hct 19.7
blood cultures, gram + cocci in clusters,
Hemoglobin 6.4  hem 20.7
Lactate 3.7
HG 7.0

## 2025-01-17 NOTE — PROGRESS NOTE ADULT - ASSESSMENT
39y  Female with h/o metastatic breast cancer ER/[place] Neg, HER-2 + on chemotherapy (last dose 12/26/24) (mets to lung, liver, spleen, spine, bone and brain), gastritis w/ intermittent episodes of dark stool (follows w/ Dr. Rosado GI and is on PPI and octreotide daily). Patient presented 1/5 with c/o worsening SOB.  She was seen in ED 1/2/25 for for weakness and SOB at which time she was found to have Hb of 5 requiring PRBC transfusion and positive for Flu A and started on tamiflu and was discharged from the ED on 1/3/25.  Her respiratory status has worsened since then w/ productive cough associated with body aches and chills.  Denies sick contacts but has had many frequent hospital visits. Patient has been afebrile, no leukocytosis. Was placed on BIPAP for Worsening respiratory status. continued on Tamiflu. Vancomycn and Zosyn was added. Blood cultures with MSSA.     Metastatic breast cancer   - last dose of trastuzumab was on 12/26/24.  -All treatment on hold at the moment.  -Follow up with primary oncologist, Dr. Chapa after discharge    Cytopenias   - secondary to malignancy and acute illness and now GIB  - Plt 130k  - S/P multiple transfusions  - Hgb 6.4 today to get another UPRBC  - GI evaluated for large bloody bowel movement  Suspect rectal bleeding is due to diverticular bleed which are self-limiting - If patient becomes hemodynamically unstable, requiring multiple transfusions,   - CT A/P No evidence of active intraluminal extravasation of contrast.   - GI f/u noted.  no plans for scope.  Previously scoped in Nov. 2024. internal hemorrhoids, gastric erosions, no active bleeding site   - c/w home sub q octreotide and IV protonix  - Will start Amicar 4 grams Q 6 H for bleeding- will titrate according to HGB   -Transfuse if hgb<7.0.      Respiratory failure   - multifocal pneumonia and flu+  - oseltamivir through 1/10  - Management as per primary team.  - TTE 1/6 with no veg  - Cardiology following- . No MARYANN at this time until stabilizes and source of bleed ascertained    MSSA Bacteremia  - cultures persistently positive. pt afebrile.   --  ID following -On nafcillin   - Would remove Port given persistent Bacteremia   -  Agree with port removal and placement of PICC or midline prior to d/c for outpt chemo     + Noro virus and giardia lamblia    Cont aggressive pain management. Remains on PCA pump.    Will follow                39y  Female with h/o metastatic breast cancer ER/[place] Neg, HER-2 + on chemotherapy (last dose 12/26/24) (mets to lung, liver, spleen, spine, bone and brain), gastritis w/ intermittent episodes of dark stool (follows w/ Dr. Rosado GI and is on PPI and octreotide daily). Patient presented 1/5 with c/o worsening SOB.  She was seen in ED 1/2/25 for for weakness and SOB at which time she was found to have Hb of 5 requiring PRBC transfusion and positive for Flu A and started on tamiflu and was discharged from the ED on 1/3/25.  Her respiratory status has worsened since then w/ productive cough associated with body aches and chills.  Denies sick contacts but has had many frequent hospital visits. Patient has been afebrile, no leukocytosis. Was placed on BIPAP for Worsening respiratory status. continued on Tamiflu. Vancomycn and Zosyn was added. Blood cultures with MSSA.     Metastatic breast cancer   - last dose of trastuzumab was on 12/26/24.  -All treatment on hold at the moment.  -Follow up with primary oncologist, Dr. Chapa after discharge    Cytopenias   - secondary to malignancy and acute illness and now GIB  - Plt 130k  - S/P multiple transfusions  - Hgb 6.4 today to get another UPRBC  - GI evaluated for large bloody bowel movement  Suspect rectal bleeding is due to diverticular bleed which are self-limiting - If patient becomes hemodynamically unstable, requiring multiple transfusions,   - CT A/P No evidence of active intraluminal extravasation of contrast.   - GI f/u noted.  no plans for scope.  Previously scoped in Nov. 2024. internal hemorrhoids, gastric erosions, no active bleeding site   - c/w home sub q octreotide and IV protonix  - Continue Amicar 4 grams Q 6 H for bleeding- will titrate according to HGB   -Transfuse if hgb<7.0.      Respiratory failure   - multifocal pneumonia and flu+  - oseltamivir through 1/10  - Management as per primary team.  - TTE 1/6 with no veg  - Cardiology following- . No MARYANN at this time until stabilizes and source of bleed ascertained    MSSA Bacteremia  - cultures persistently positive. pt afebrile.   --  ID following -On nafcillin   - Would remove Port given persistent Bacteremia   -  Agree with port removal and placement of PICC or midline prior to d/c for outpt chemo     + Noro virus and giardia lamblia    Cont aggressive pain management. Remains on PCA pump.    Will follow                39y  Female with h/o metastatic breast cancer ER/[place] Neg, HER-2 + on chemotherapy (last dose 12/26/24) (mets to lung, liver, spleen, spine, bone and brain), gastritis w/ intermittent episodes of dark stool (follows w/ Dr. Rosado GI and is on PPI and octreotide daily). Patient presented 1/5 with c/o worsening SOB.  She was seen in ED 1/2/25 for for weakness and SOB at which time she was found to have Hb of 5 requiring PRBC transfusion and positive for Flu A and started on tamiflu and was discharged from the ED on 1/3/25.  Her respiratory status has worsened since then w/ productive cough associated with body aches and chills.  Denies sick contacts but has had many frequent hospital visits. Patient has been afebrile, no leukocytosis. Was placed on BIPAP for Worsening respiratory status. continued on Tamiflu. Vancomycn and Zosyn was added. Blood cultures with MSSA.     Metastatic breast cancer   - last dose of trastuzumab was on 12/26/24.  -All treatment on hold at the moment.  -Follow up with primary oncologist, Dr. Chapa after discharge    Cytopenias   - secondary to malignancy and acute illness and now GIB  - Plt 130k  - S/P multiple transfusions  - Hgb 6.4 today to get another UPRBC  - GI evaluated for large bloody bowel movement  Suspect rectal bleeding is due to diverticular bleed which are self-limiting - If patient becomes hemodynamically unstable, requiring multiple transfusions,   - CT A/P No evidence of active intraluminal extravasation of contrast.   - GI f/u noted.  no plans for scope.  Previously scoped in Nov. 2024. internal hemorrhoids, gastric erosions, no active bleeding site   - c/w home sub q octreotide and IV protonix  - Continue Amicar 4 grams Q 6 H for bleeding- will titrate according to HGB   -Transfuse if hgb<7.0.    - Pt is requesting GI come back to re evaluate     Respiratory failure   - multifocal pneumonia and flu+  - oseltamivir through 1/10  - Management as per primary team.  - TTE 1/6 with no veg  - Cardiology following- . No MARYANN at this time until stabilizes and source of bleed ascertained    MSSA Bacteremia  - cultures persistently positive. pt afebrile.   --  ID following -On nafcillin   - Would remove Port given persistent Bacteremia   -  Agree with port removal and placement of PICC or midline prior to d/c for outpt chemo     + Noro virus and giardia lamblia    Cont aggressive pain management. Remains on PCA pump.    Will follow

## 2025-01-18 LAB
ANION GAP SERPL CALC-SCNC: 11 MMOL/L — SIGNIFICANT CHANGE UP (ref 5–17)
BUN SERPL-MCNC: 5.7 MG/DL — LOW (ref 8–20)
CALCIUM SERPL-MCNC: 7.2 MG/DL — LOW (ref 8.4–10.5)
CHLORIDE SERPL-SCNC: 108 MMOL/L — SIGNIFICANT CHANGE UP (ref 96–108)
CO2 SERPL-SCNC: 22 MMOL/L — SIGNIFICANT CHANGE UP (ref 22–29)
CREAT SERPL-MCNC: 0.5 MG/DL — SIGNIFICANT CHANGE UP (ref 0.5–1.3)
EGFR: 123 ML/MIN/1.73M2 — SIGNIFICANT CHANGE UP
GLUCOSE BLDC GLUCOMTR-MCNC: 120 MG/DL — HIGH (ref 70–99)
GLUCOSE BLDC GLUCOMTR-MCNC: 147 MG/DL — HIGH (ref 70–99)
GLUCOSE BLDC GLUCOMTR-MCNC: 161 MG/DL — HIGH (ref 70–99)
GLUCOSE BLDC GLUCOMTR-MCNC: 94 MG/DL — SIGNIFICANT CHANGE UP (ref 70–99)
GLUCOSE SERPL-MCNC: 115 MG/DL — HIGH (ref 70–99)
HCT VFR BLD CALC: 27.8 % — LOW (ref 34.5–45)
HGB BLD-MCNC: 8.6 G/DL — LOW (ref 11.5–15.5)
LACTATE SERPL-SCNC: 1.7 MMOL/L — SIGNIFICANT CHANGE UP (ref 0.5–2)
MAGNESIUM SERPL-MCNC: 2.2 MG/DL — SIGNIFICANT CHANGE UP (ref 1.6–2.6)
MCHC RBC-ENTMCNC: 28.6 PG — SIGNIFICANT CHANGE UP (ref 27–34)
MCHC RBC-ENTMCNC: 30.9 G/DL — LOW (ref 32–36)
MCV RBC AUTO: 92.4 FL — SIGNIFICANT CHANGE UP (ref 80–100)
PLATELET # BLD AUTO: 136 K/UL — LOW (ref 150–400)
POTASSIUM SERPL-MCNC: 3.9 MMOL/L — SIGNIFICANT CHANGE UP (ref 3.5–5.3)
POTASSIUM SERPL-SCNC: 3.9 MMOL/L — SIGNIFICANT CHANGE UP (ref 3.5–5.3)
RBC # BLD: 3.01 M/UL — LOW (ref 3.8–5.2)
RBC # FLD: 24 % — HIGH (ref 10.3–14.5)
SODIUM SERPL-SCNC: 140 MMOL/L — SIGNIFICANT CHANGE UP (ref 135–145)
WBC # BLD: 9.26 K/UL — SIGNIFICANT CHANGE UP (ref 3.8–10.5)
WBC # FLD AUTO: 9.26 K/UL — SIGNIFICANT CHANGE UP (ref 3.8–10.5)

## 2025-01-18 PROCEDURE — 71045 X-RAY EXAM CHEST 1 VIEW: CPT | Mod: 26

## 2025-01-18 PROCEDURE — 99233 SBSQ HOSP IP/OBS HIGH 50: CPT

## 2025-01-18 RX ORDER — HYDROMORPHONE HYDROCHLORIDE 4 MG/ML
1 INJECTION, SOLUTION INTRAMUSCULAR; INTRAVENOUS; SUBCUTANEOUS ONCE
Refills: 0 | Status: DISCONTINUED | OUTPATIENT
Start: 2025-01-18 | End: 2025-01-18

## 2025-01-18 RX ORDER — ACETAMINOPHEN 160 MG/5ML
1000 SUSPENSION ORAL ONCE
Refills: 0 | Status: COMPLETED | OUTPATIENT
Start: 2025-01-18 | End: 2025-01-18

## 2025-01-18 RX ORDER — HYDROMORPHONE HYDROCHLORIDE 4 MG/ML
30 INJECTION, SOLUTION INTRAMUSCULAR; INTRAVENOUS; SUBCUTANEOUS
Refills: 0 | Status: DISCONTINUED | OUTPATIENT
Start: 2025-01-18 | End: 2025-01-21

## 2025-01-18 RX ADMIN — Medication 1 APPLICATION(S): at 06:28

## 2025-01-18 RX ADMIN — Medication 1.25 MILLIGRAM(S): at 08:15

## 2025-01-18 RX ADMIN — Medication 1.25 MILLIGRAM(S): at 04:12

## 2025-01-18 RX ADMIN — Medication 100 MILLIGRAM(S): at 14:45

## 2025-01-18 RX ADMIN — PREGABALIN CAPSULES, CV 200 MILLIGRAM(S): 225 CAPSULE ORAL at 06:25

## 2025-01-18 RX ADMIN — HYDROMORPHONE HYDROCHLORIDE 30 MILLILITER(S): 4 INJECTION, SOLUTION INTRAMUSCULAR; INTRAVENOUS; SUBCUTANEOUS at 19:17

## 2025-01-18 RX ADMIN — NAFCILLIN INJECTION 200 GRAM(S): 2 POWDER, FOR SOLUTION INTRAMUSCULAR; INTRAMUSCULAR; INTRAVENOUS at 14:17

## 2025-01-18 RX ADMIN — AMINOCAPROIC ACID 4 GRAM(S): 1000 TABLET ORAL at 17:52

## 2025-01-18 RX ADMIN — PREDNISONE 20 MILLIGRAM(S): 5 TABLET ORAL at 06:28

## 2025-01-18 RX ADMIN — Medication 4 MILLILITER(S): at 20:51

## 2025-01-18 RX ADMIN — OCTREOTIDE ACETATE 100 MICROGRAM(S): 1000 INJECTION INTRAVENOUS; SUBCUTANEOUS at 17:52

## 2025-01-18 RX ADMIN — HYDROMORPHONE HYDROCHLORIDE 1 MILLIGRAM(S): 4 INJECTION, SOLUTION INTRAMUSCULAR; INTRAVENOUS; SUBCUTANEOUS at 15:18

## 2025-01-18 RX ADMIN — AMINOCAPROIC ACID 4 GRAM(S): 1000 TABLET ORAL at 21:32

## 2025-01-18 RX ADMIN — HYDROMORPHONE HYDROCHLORIDE 30 MILLILITER(S): 4 INJECTION, SOLUTION INTRAMUSCULAR; INTRAVENOUS; SUBCUTANEOUS at 08:02

## 2025-01-18 RX ADMIN — DEXTROMETHORPHAN HBR AND GUAIFENESIN ORAL SOLUTION 10 MILLILITER(S): 10; 100 LIQUID ORAL at 14:45

## 2025-01-18 RX ADMIN — Medication 1.25 MILLIGRAM(S): at 20:51

## 2025-01-18 RX ADMIN — ACETAMINOPHEN 400 MILLIGRAM(S): 160 SUSPENSION ORAL at 04:18

## 2025-01-18 RX ADMIN — Medication 1 APPLICATION(S): at 17:53

## 2025-01-18 RX ADMIN — PREGABALIN CAPSULES, CV 200 MILLIGRAM(S): 225 CAPSULE ORAL at 21:31

## 2025-01-18 RX ADMIN — Medication 1: at 12:41

## 2025-01-18 RX ADMIN — NAFCILLIN INJECTION 200 GRAM(S): 2 POWDER, FOR SOLUTION INTRAMUSCULAR; INTRAMUSCULAR; INTRAVENOUS at 21:32

## 2025-01-18 RX ADMIN — Medication 4 MILLILITER(S): at 14:41

## 2025-01-18 RX ADMIN — ANTISEPTIC SURGICAL SCRUB 1 APPLICATION(S): 0.04 SOLUTION TOPICAL at 12:42

## 2025-01-18 RX ADMIN — HYDROMORPHONE HYDROCHLORIDE 30 MILLILITER(S): 4 INJECTION, SOLUTION INTRAMUSCULAR; INTRAVENOUS; SUBCUTANEOUS at 15:26

## 2025-01-18 RX ADMIN — HYDROMORPHONE HYDROCHLORIDE 1 MILLIGRAM(S): 4 INJECTION, SOLUTION INTRAMUSCULAR; INTRAVENOUS; SUBCUTANEOUS at 14:18

## 2025-01-18 RX ADMIN — OCTREOTIDE ACETATE 100 MICROGRAM(S): 1000 INJECTION INTRAVENOUS; SUBCUTANEOUS at 08:17

## 2025-01-18 RX ADMIN — Medication 1.25 MILLIGRAM(S): at 14:41

## 2025-01-18 RX ADMIN — PANTOPRAZOLE 40 MILLIGRAM(S): 20 TABLET, DELAYED RELEASE ORAL at 17:53

## 2025-01-18 RX ADMIN — AMINOCAPROIC ACID 4 GRAM(S): 1000 TABLET ORAL at 06:20

## 2025-01-18 RX ADMIN — DEXTROMETHORPHAN HBR AND GUAIFENESIN ORAL SOLUTION 10 MILLILITER(S): 10; 100 LIQUID ORAL at 03:43

## 2025-01-18 RX ADMIN — PANTOPRAZOLE 40 MILLIGRAM(S): 20 TABLET, DELAYED RELEASE ORAL at 06:29

## 2025-01-18 RX ADMIN — METHADONE HYDROCHLORIDE 5 MILLIGRAM(S): 5 SOLUTION ORAL at 14:18

## 2025-01-18 RX ADMIN — ACETAMINOPHEN, DIPHENHYDRAMINE HCL, PHENYLEPHRINE HCL 3 MILLIGRAM(S): 325; 25; 5 TABLET ORAL at 21:31

## 2025-01-18 RX ADMIN — HYDROMORPHONE HYDROCHLORIDE 30 MILLILITER(S): 4 INJECTION, SOLUTION INTRAMUSCULAR; INTRAVENOUS; SUBCUTANEOUS at 07:22

## 2025-01-18 RX ADMIN — ACETAMINOPHEN 1000 MILLIGRAM(S): 160 SUSPENSION ORAL at 05:00

## 2025-01-18 RX ADMIN — HYDROMORPHONE HYDROCHLORIDE 30 MILLILITER(S): 4 INJECTION, SOLUTION INTRAMUSCULAR; INTRAVENOUS; SUBCUTANEOUS at 11:25

## 2025-01-18 RX ADMIN — ONDANSETRON 4 MILLIGRAM(S): 4 TABLET, ORALLY DISINTEGRATING ORAL at 03:44

## 2025-01-18 RX ADMIN — METHADONE HYDROCHLORIDE 5 MILLIGRAM(S): 5 SOLUTION ORAL at 06:25

## 2025-01-18 RX ADMIN — NAFCILLIN INJECTION 200 GRAM(S): 2 POWDER, FOR SOLUTION INTRAMUSCULAR; INTRAMUSCULAR; INTRAVENOUS at 06:21

## 2025-01-18 RX ADMIN — Medication 4 MILLILITER(S): at 04:12

## 2025-01-18 RX ADMIN — Medication 4 MILLILITER(S): at 08:15

## 2025-01-18 RX ADMIN — PREGABALIN CAPSULES, CV 200 MILLIGRAM(S): 225 CAPSULE ORAL at 14:17

## 2025-01-18 RX ADMIN — AMINOCAPROIC ACID 4 GRAM(S): 1000 TABLET ORAL at 10:24

## 2025-01-18 RX ADMIN — DEXTROMETHORPHAN HBR AND GUAIFENESIN ORAL SOLUTION 10 MILLILITER(S): 10; 100 LIQUID ORAL at 21:31

## 2025-01-18 RX ADMIN — DEXTROSE MONOHYDRATE, SODIUM CHLORIDE, AND POTASSIUM CHLORIDE 100 MILLILITER(S): 50; 2.25; 2.24 INJECTION, SOLUTION INTRAVENOUS at 21:31

## 2025-01-18 RX ADMIN — NAFCILLIN INJECTION 200 GRAM(S): 2 POWDER, FOR SOLUTION INTRAMUSCULAR; INTRAMUSCULAR; INTRAVENOUS at 10:24

## 2025-01-18 RX ADMIN — NAFCILLIN INJECTION 200 GRAM(S): 2 POWDER, FOR SOLUTION INTRAMUSCULAR; INTRAMUSCULAR; INTRAVENOUS at 00:17

## 2025-01-18 RX ADMIN — NAFCILLIN INJECTION 200 GRAM(S): 2 POWDER, FOR SOLUTION INTRAMUSCULAR; INTRAMUSCULAR; INTRAVENOUS at 17:53

## 2025-01-18 RX ADMIN — ALBENDAZOLE 400 MILLIGRAM(S): 200 TABLET, FILM COATED ORAL at 12:42

## 2025-01-18 RX ADMIN — Medication 100 MILLIGRAM(S): at 03:43

## 2025-01-18 NOTE — PROGRESS NOTE ADULT - SUBJECTIVE AND OBJECTIVE BOX
39y  Female with h/o metastatic breast cancer ER/[place] Neg, HER-2 + on chemotherapy (last dose 12/26/24) (mets to lung, liver, spleen, spine, bone and brain), gastritis w/ intermittent episodes of dark stool (follows w/ Dr. Alonzo BISHOP and is on PPI and octreotide daily). Patient presented 1/5 with c/o worsening SOB.  She was seen in ED 1/2/25 for for weakness and SOB at which time she was found to have Hb of 5 requiring PRBC transfusion and positive for Flu A and started on tamiflu and was discharged from the ED on 1/3/25.  Her respiratory status has worsened since then w/ productive cough associated with body aches and chills.  Denies sick contacts but has had many frequent hospital visits. Patient has been afebrile, no leukocytosis. Was placed on BIPAP for Worsening respiratory status. continued on Tamiflu. Vancomycn and Zosyn was added. Blood cultures with MSSA.     1/17/25 Father concerned GI has not been back to see pt and she is being transfused almost daily         MEDICATIONS  (STANDING):  acetylcysteine 10%  Inhalation 4 milliLiter(s) Inhalation every 6 hours  albuterol/ipratropium for Nebulization. 3 milliLiter(s) Nebulizer once  chlorhexidine 2% Cloths 1 Application(s) Topical daily  dextrose 5%. 1000 milliLiter(s) (50 mL/Hr) IV Continuous <Continuous>  dextrose 5%. 1000 milliLiter(s) (100 mL/Hr) IV Continuous <Continuous>  dextrose 50% Injectable 25 Gram(s) IV Push once  dextrose 50% Injectable 12.5 Gram(s) IV Push once  dextrose 50% Injectable 25 Gram(s) IV Push once  furosemide   Injectable 40 milliGRAM(s) IV Push daily  glucagon  Injectable 1 milliGRAM(s) IntraMuscular once  HYDROmorphone PCA (1 mG/mL) 30 milliLiter(s) PCA Continuous PCA Continuous  insulin lispro (ADMELOG) corrective regimen sliding scale   SubCutaneous three times a day before meals  levalbuterol Inhalation 1.25 milliGRAM(s) Inhalation every 6 hours  methadone    Tablet 10 milliGRAM(s) Oral at bedtime  methadone    Tablet 5 milliGRAM(s) Oral <User Schedule>  methylPREDNISolone sodium succinate Injectable 40 milliGRAM(s) IV Push every 24 hours  nafcillin  IVPB 2 Gram(s) IV Intermittent every 4 hours  octreotide  Injectable 100 MICROGram(s) SubCutaneous two times a day  pantoprazole  Injectable 40 milliGRAM(s) IV Push every 12 hours  pregabalin 200 milliGRAM(s) Oral every 8 hours    MEDICATIONS  (PRN):  acetaminophen     Tablet .. 650 milliGRAM(s) Oral every 6 hours PRN Temp greater or equal to 38C (100.4F), Mild Pain (1 - 3)  aluminum hydroxide/magnesium hydroxide/simethicone Suspension 30 milliLiter(s) Oral every 4 hours PRN Dyspepsia  benzonatate 100 milliGRAM(s) Oral every 8 hours PRN Cough  dextrose Oral Gel 15 Gram(s) Oral once PRN Blood Glucose LESS THAN 70 milliGRAM(s)/deciliter  guaifenesin/dextromethorphan Oral Liquid 10 milliLiter(s) Oral every 4 hours PRN Cough  melatonin 3 milliGRAM(s) Oral at bedtime PRN Insomnia  naloxone Injectable 0.1 milliGRAM(s) IV Push every 3 minutes PRN For ANY of the following changes in patient status:  A. RR LESS THAN 10 breaths per minute, B. Oxygen saturation LESS THAN 90%, C. Sedation score of 6  ondansetron Injectable 4 milliGRAM(s) IV Push every 8 hours PRN Nausea and/or Vomiting    ICU Vital Signs Last 24 Hrs  T(C): 36.6 (18 Jan 2025 08:09), Max: 37.1 (17 Jan 2025 12:28)  T(F): 97.8 (18 Jan 2025 08:09), Max: 98.7 (17 Jan 2025 12:28)  HR: 76 (18 Jan 2025 09:14) (72 - 88)  BP: 102/69 (18 Jan 2025 08:00) (99/68 - 110/77)  BP(mean): 79 (18 Jan 2025 08:00) (66 - 91)  ABP: --  ABP(mean): --  RR: 18 (18 Jan 2025 08:00) (14 - 18)  SpO2: 99% (18 Jan 2025 09:14) (97% - 100%)    O2 Parameters below as of 18 Jan 2025 09:14  Patient On (Oxygen Delivery Method): nasal cannula      PE:  NAD  On O2 NC  bilateral rales  abd soft, nd, nt  a/o x 3      CBC                          8.6    9.26  )-----------( 136      ( 18 Jan 2025 04:55 )             27.8                             6.4    9.75  )-----------( 130      ( 17 Jan 2025 06:45 )             20.7                             7.2    6.88  )-----------( 151      ( 16 Jan 2025 06:10 )             23.2                           7.9    10.16 )-----------( 123      ( 15 Cristhian 2025 06:10 )             25.4           Chem:       01-17    139  |  104  |  8.7  ----------------------------<  163[H]  2.9[LL]   |  26.0  |  0.70    Ca    7.3[L]      17 Jan 2025 06:45  Phos  4.2     01-17  Mg     1.5     01-17    TPro  5.0[L]  /  Alb  2.1[L]  /  TBili  0.8  /  DBili  0.3  /  AST  17  /  ALT  13  /  AlkPhos  127[H]  01-16      01-15    138  |  102  |  12.3  ----------------------------<  135[H]  3.6   |  25.0  |  0.67    Ca    8.1[L]      15 Cristhian 2025 06:10      01-14    141  |  103  |  13.4  ----------------------------<  127[H]  3.4[L]   |  30.0[H]  |  0.54    Ca    8.1[L]      14 Jan 2025 04:48        01-13    141  |  103  |  20.3[H]  ----------------------------<  137[H]  3.8   |  27.0  |  0.47[L]    Ca    8.2[L]      13 Jan 2025 06:10        01-09    138  |  100  |  21.3[H]  ----------------------------<  152[H]  3.5   |  29.0  |  0.54    Ca    7.0[L]      09 Jan 2025 04:57  Mg     1.9     01-08    TPro  5.0[L]  /  Alb  2.5[L]  /  TBili  1.8  /  DBili  1.3[H]  /  AST  24  /  ALT  23  /  AlkPhos  238[H]  01-09 01-08    140  |  103  |  17.8  ----------------------------<  112[H]  3.5   |  27.0  |  0.56    Ca    6.8[L]      08 Jan 2025 06:20  Mg     1.9     01-08    TPro  5.4[L]  /  Alb  2.5[L]  /  TBili  1.9  /  DBili  1.3[H]  /  AST  24  /  ALT  25  /  AlkPhos  240[H]  01-08          ( 01-07-25 @ 06:15 )    139  |  106  |  17.5  ----------------------------<  394[H]  3.3[L]   |  20.0[L]  |  0.61    Liver Functions: ( 01-07-25 @ 06:15 )  Alb: 2.6 g/dL / Pro: 5.5 g/dL / ALK PHOS: 228 U/L / ALT: 22 U/L / AST: 16 U/L / GGT: x                   39y  Female with h/o metastatic breast cancer ER/[place] Neg, HER-2 + on chemotherapy (last dose 12/26/24) (mets to lung, liver, spleen, spine, bone and brain), gastritis w/ intermittent episodes of dark stool (follows w/ Dr. Alonzo BISHOP and is on PPI and octreotide daily). Patient presented 1/5 with c/o worsening SOB.  She was seen in ED 1/2/25 for for weakness and SOB at which time she was found to have Hb of 5 requiring PRBC transfusion and positive for Flu A and started on tamiflu and was discharged from the ED on 1/3/25.  Her respiratory status has worsened since then w/ productive cough associated with body aches and chills.  Denies sick contacts but has had many frequent hospital visits. Patient has been afebrile, no leukocytosis. Was placed on BIPAP for Worsening respiratory status. continued on Tamiflu. Vancomycn and Zosyn was added. Blood cultures with MSSA.     1/17/25 Father concerned GI has not been back to see pt and she is being transfused almost daily     1/18/2025 - c/o cough this and bloody BM, ON had R sided chest pain , now better        MEDICATIONS  (STANDING):  acetylcysteine 10%  Inhalation 4 milliLiter(s) Inhalation every 6 hours  albuterol/ipratropium for Nebulization. 3 milliLiter(s) Nebulizer once  chlorhexidine 2% Cloths 1 Application(s) Topical daily  dextrose 5%. 1000 milliLiter(s) (50 mL/Hr) IV Continuous <Continuous>  dextrose 5%. 1000 milliLiter(s) (100 mL/Hr) IV Continuous <Continuous>  dextrose 50% Injectable 25 Gram(s) IV Push once  dextrose 50% Injectable 12.5 Gram(s) IV Push once  dextrose 50% Injectable 25 Gram(s) IV Push once  furosemide   Injectable 40 milliGRAM(s) IV Push daily  glucagon  Injectable 1 milliGRAM(s) IntraMuscular once  HYDROmorphone PCA (1 mG/mL) 30 milliLiter(s) PCA Continuous PCA Continuous  insulin lispro (ADMELOG) corrective regimen sliding scale   SubCutaneous three times a day before meals  levalbuterol Inhalation 1.25 milliGRAM(s) Inhalation every 6 hours  methadone    Tablet 10 milliGRAM(s) Oral at bedtime  methadone    Tablet 5 milliGRAM(s) Oral <User Schedule>  methylPREDNISolone sodium succinate Injectable 40 milliGRAM(s) IV Push every 24 hours  nafcillin  IVPB 2 Gram(s) IV Intermittent every 4 hours  octreotide  Injectable 100 MICROGram(s) SubCutaneous two times a day  pantoprazole  Injectable 40 milliGRAM(s) IV Push every 12 hours  pregabalin 200 milliGRAM(s) Oral every 8 hours    MEDICATIONS  (PRN):  acetaminophen     Tablet .. 650 milliGRAM(s) Oral every 6 hours PRN Temp greater or equal to 38C (100.4F), Mild Pain (1 - 3)  aluminum hydroxide/magnesium hydroxide/simethicone Suspension 30 milliLiter(s) Oral every 4 hours PRN Dyspepsia  benzonatate 100 milliGRAM(s) Oral every 8 hours PRN Cough  dextrose Oral Gel 15 Gram(s) Oral once PRN Blood Glucose LESS THAN 70 milliGRAM(s)/deciliter  guaifenesin/dextromethorphan Oral Liquid 10 milliLiter(s) Oral every 4 hours PRN Cough  melatonin 3 milliGRAM(s) Oral at bedtime PRN Insomnia  naloxone Injectable 0.1 milliGRAM(s) IV Push every 3 minutes PRN For ANY of the following changes in patient status:  A. RR LESS THAN 10 breaths per minute, B. Oxygen saturation LESS THAN 90%, C. Sedation score of 6  ondansetron Injectable 4 milliGRAM(s) IV Push every 8 hours PRN Nausea and/or Vomiting    ICU Vital Signs Last 24 Hrs  T(C): 36.6 (18 Jan 2025 08:09), Max: 37.1 (17 Jan 2025 12:28)  T(F): 97.8 (18 Jan 2025 08:09), Max: 98.7 (17 Jan 2025 12:28)  HR: 76 (18 Jan 2025 09:14) (72 - 88)  BP: 102/69 (18 Jan 2025 08:00) (99/68 - 110/77)  BP(mean): 79 (18 Jan 2025 08:00) (66 - 91)  ABP: --  ABP(mean): --  RR: 18 (18 Jan 2025 08:00) (14 - 18)  SpO2: 99% (18 Jan 2025 09:14) (97% - 100%)    O2 Parameters below as of 18 Jan 2025 09:14  Patient On (Oxygen Delivery Method): nasal cannula      PE:  NAD  On O2 NC  bilateral rales  abd soft, nd, nt  a/o x 3      CBC                          8.6    9.26  )-----------( 136      ( 18 Jan 2025 04:55 )             27.8                             6.4    9.75  )-----------( 130      ( 17 Jan 2025 06:45 )             20.7                             7.2    6.88  )-----------( 151      ( 16 Jan 2025 06:10 )             23.2                           7.9    10.16 )-----------( 123      ( 15 Cristhian 2025 06:10 )             25.4           Chem:       01-17    139  |  104  |  8.7  ----------------------------<  163[H]  2.9[LL]   |  26.0  |  0.70    Ca    7.3[L]      17 Jan 2025 06:45  Phos  4.2     01-17  Mg     1.5     01-17    TPro  5.0[L]  /  Alb  2.1[L]  /  TBili  0.8  /  DBili  0.3  /  AST  17  /  ALT  13  /  AlkPhos  127[H]  01-16      01-15    138  |  102  |  12.3  ----------------------------<  135[H]  3.6   |  25.0  |  0.67    Ca    8.1[L]      15 Cristhian 2025 06:10      01-14    141  |  103  |  13.4  ----------------------------<  127[H]  3.4[L]   |  30.0[H]  |  0.54    Ca    8.1[L]      14 Jan 2025 04:48        01-13    141  |  103  |  20.3[H]  ----------------------------<  137[H]  3.8   |  27.0  |  0.47[L]    Ca    8.2[L]      13 Jan 2025 06:10        01-09    138  |  100  |  21.3[H]  ----------------------------<  152[H]  3.5   |  29.0  |  0.54    Ca    7.0[L]      09 Jan 2025 04:57  Mg     1.9     01-08    TPro  5.0[L]  /  Alb  2.5[L]  /  TBili  1.8  /  DBili  1.3[H]  /  AST  24  /  ALT  23  /  AlkPhos  238[H]  01-09 01-08    140  |  103  |  17.8  ----------------------------<  112[H]  3.5   |  27.0  |  0.56    Ca    6.8[L]      08 Jan 2025 06:20  Mg     1.9     01-08    TPro  5.4[L]  /  Alb  2.5[L]  /  TBili  1.9  /  DBili  1.3[H]  /  AST  24  /  ALT  25  /  AlkPhos  240[H]  01-08          ( 01-07-25 @ 06:15 )    139  |  106  |  17.5  ----------------------------<  394[H]  3.3[L]   |  20.0[L]  |  0.61    Liver Functions: ( 01-07-25 @ 06:15 )  Alb: 2.6 g/dL / Pro: 5.5 g/dL / ALK PHOS: 228 U/L / ALT: 22 U/L / AST: 16 U/L / GGT: x

## 2025-01-18 NOTE — PROGRESS NOTE ADULT - ASSESSMENT
39y  Female with h/o metastatic breast cancer ER/[place] Neg, HER-2 + on chemotherapy (last dose 12/26/24) (mets to lung, liver, spleen, spine, bone and brain), gastritis w/ intermittent episodes of dark stool (follows w/ Dr. Rosado GI and is on PPI and octreotide daily). Patient presented 1/5 with c/o worsening SOB.  She was seen in ED 1/2/25 for for weakness and SOB at which time she was found to have Hb of 5 requiring PRBC transfusion and positive for Flu A and started on tamiflu and was discharged from the ED on 1/3/25.  Her respiratory status has worsened since then w/ productive cough associated with body aches and chills. Admitted for sepsis ,acute hypoxic respiratory failure with flu A. s/p HFNC for worsening respiratory status. S/P course of Tamiflu. Vancomycin and Zosyn was added for possible superimposed bacterial pna. Blood cultures with MSSA.  Cardiology consultation requested for evaluation of endocarditis. Blood cultures 1/5, 1/7, 1/9 reporting MSSA , Repeat blood cultures ngtd on 1/11/25, Sputum Cx 1/6 Staphylococcus aureus. MARYANN hold for active gib requring transfusion. Pt had large BM likley diverticular bleed. Had recent scopy. Gi following. ct abd/pelvis w/iv contrast no active bleed (1/13). Urine Cx 1/5 reporting 10k - 49k Escherichia coli  of ? significance  since UA not concerning for UTI.  RVP/COVID 19 PCR + Influenza A.GI PCR  positive norovirus,Giardia.        Hypotension,lactic acidosis - likely from volume loss   Nororvirus,Giadia gastroenteritis  Hypokalemia     -GI PCR  positive norovirus,Giadia  -Albendazole per ID  -BMP .Replaced lytes. recheck k. add k with ivf   -F/U ID REC  -fluids as needed, encourage po hydration    Acute blood loss anemia   GIB  -s/p multiple transfusions during this hospital stay  -goal hgb of 7   -CTA abd/pelvis  no active bleed   -PPI/OCTRIO  -GI eval with no plan for scopes 2/2 recently done.  Still with bleeding.  reed GI history, ? GAVE disease per GI notes Has had multiple EGD/colonoscopies in past as well.   -hem/onc following  -hem/onc started Amicar on1/16      Thrombocytopenia  likely from chemo/sepsis  -will monitor cbc        Sepsis 2/2 Flu A w/ superimposed multifocal PNA  Acute hypoxic respiratory failure 2/2 Flu A w/ superimposed multifocal PNA   - on NC, comfortable  - persistently positive RVP for flu A  - s/p  tamiflu   - repeat CT 1/8 shows improved pleural effusion but worsening GGOs, unclear if infectious or malignant etiology  - repeat CT 1/9 with similar findings  - repeat CT next week to evaluate for improvement of infiltrates  - po steroid tapering dose  - c/w abx per ID recs  - sputum clx growing moderate staph aureus   - TTE EF 65-70%, no WMA    MSSA bacteremia  - blood clx positive 1/5, repeat bl c/s (1/11) ngtd  - ID following  - c/w nafcillin  per id  - MARYANN on hold for gib. f/u cardio rec  - per ID, may need chemo port removal, will follow up  -f/u ID REC        Chronic normocytic anemia 2/2 metastatic breast cancer on chemo and 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   Thrombocytopenia likely 2/2 sepsis   Breast cancer ER/FL Neg, HER-2 pos on chemotherapy w/ mets to lung, liver, spleen, spine, bone and brain  - Baseline Hb ranges 8-9    - s/p prbc transfusion on 1/2, 1/8 and 1/9,1/11,1/13,1/14  - trend CBC, transfuse for hgb <7  - c/w home sub q octreotide and IV protonix  - Monitor CBC and transfuse for Hb<7 and plts<20K in setting of sepsis   - [facility] following  - monitor on tele and     Breast cancer ER/FL Neg, HER-2 pos on chemotherapy w/ mets to lung, liver, spleen, spine, bone and brain  - c/w pregabalin, methylphenidate (confirmed on ISTOP Reference #: [id number])  - Reports being on methadone for pain but last 30 day script on istop dispensed 09/19/24  -on dilaudid pca pump -- will adjust demand dosing to 0.5mg (lockout 8mins) with 0.1mg qhourly running  - pain management following    dvt ppx scd  disposition: still medically active, anticipate 3-4 day LOS

## 2025-01-18 NOTE — PROGRESS NOTE ADULT - SUBJECTIVE AND OBJECTIVE BOX
[facility] Division of Hospital Medicine    SUBJECTIVE / OVERNIGHT EVENTS:    pt seen and examined at bedside  having right sided hip pain  night prior had bm mixed w blood  tolerating po diet    MEDICATIONS  (STANDING):  acetylcysteine 10%  Inhalation 4 milliLiter(s) Inhalation every 6 hours  albendazole 400 milliGRAM(s) Oral daily  aminocaproic acid Tablet 4 Gram(s) Oral every 6 hours  chlorhexidine 2% Cloths 1 Application(s) Topical daily  dextrose 5%. 1000 milliLiter(s) (50 mL/Hr) IV Continuous <Continuous>  dextrose 5%. 1000 milliLiter(s) (100 mL/Hr) IV Continuous <Continuous>  dextrose 50% Injectable 25 Gram(s) IV Push once  dextrose 50% Injectable 12.5 Gram(s) IV Push once  dextrose 50% Injectable 25 Gram(s) IV Push once  glucagon  Injectable 1 milliGRAM(s) IntraMuscular once  hydrocortisone 2.5% Rectal Cream 1 Application(s) Rectal two times a day  HYDROmorphone PCA (1 mG/mL) 30 milliLiter(s) PCA Continuous PCA Continuous  insulin lispro (ADMELOG) corrective regimen sliding scale   SubCutaneous three times a day before meals  levalbuterol Inhalation 1.25 milliGRAM(s) Inhalation every 6 hours  methadone    Tablet 5 milliGRAM(s) Oral <User Schedule>  nafcillin  IVPB 2 Gram(s) IV Intermittent every 4 hours  octreotide  Injectable 100 MICROGram(s) SubCutaneous two times a day  pantoprazole  Injectable 40 milliGRAM(s) IV Push every 12 hours  predniSONE   Tablet 20 milliGRAM(s) Oral daily  pregabalin 200 milliGRAM(s) Oral every 8 hours  sodium chloride 0.9% with potassium chloride 20 mEq/L 1000 milliLiter(s) (100 mL/Hr) IV Continuous <Continuous>    MEDICATIONS  (PRN):  acetaminophen     Tablet .. 650 milliGRAM(s) Oral every 6 hours PRN Temp greater or equal to 38C (100.4F), Mild Pain (1 - 3)  aluminum hydroxide/magnesium hydroxide/simethicone Suspension 30 milliLiter(s) Oral every 4 hours PRN Dyspepsia  benzonatate 100 milliGRAM(s) Oral every 8 hours PRN Cough  dextrose Oral Gel 15 Gram(s) Oral once PRN Blood Glucose LESS THAN 70 milliGRAM(s)/deciliter  guaifenesin/dextromethorphan Oral Liquid 10 milliLiter(s) Oral every 4 hours PRN Cough  melatonin 3 milliGRAM(s) Oral at bedtime PRN Insomnia  naloxone Injectable 0.1 milliGRAM(s) IV Push every 3 minutes PRN For ANY of the following changes in patient status:  A. RR LESS THAN 10 breaths per minute, B. Oxygen saturation LESS THAN 90%, C. Sedation score of 6  ondansetron Injectable 4 milliGRAM(s) IV Push every 8 hours PRN Nausea and/or Vomiting        I&O's Summary    17 Jan 2025 07:01  -  18 Jan 2025 07:00  --------------------------------------------------------  IN: 3213 mL / OUT: 300 mL / NET: 2913 mL    18 Jan 2025 07:01  -  18 Jan 2025 15:18  --------------------------------------------------------  IN: 1430 mL / OUT: 120 mL / NET: 1310 mL        PHYSICAL EXAM:  Vital Signs Last 24 Hrs  T(C): 36.6 (18 Jan 2025 08:09), Max: 36.8 (17 Jan 2025 19:20)  T(F): 97.8 (18 Jan 2025 08:09), Max: 98.2 (17 Jan 2025 19:20)  HR: 81 (18 Jan 2025 14:00) (72 - 98)  BP: 105/52 (18 Jan 2025 14:00) (97/62 - 110/77)  BP(mean): 67 (18 Jan 2025 14:00) (66 - 88)  RR: 18 (18 Jan 2025 14:00) (14 - 18)  SpO2: 100% (18 Jan 2025 14:00) (97% - 100%)    Parameters below as of 18 Jan 2025 14:00  Patient On (Oxygen Delivery Method): nasal cannula  O2 Flow (L/min): 2          CONSTITUTIONAL: NAD, well-groomed  ENMT: Moist oral mucosa, no pharyngeal injection or exudates; normal dentition  RESPIRATORY: Normal respiratory effort; lungs are clear to auscultation bilaterally  CARDIOVASCULAR: Regular rate and rhythm, normal S1 and S2, no murmur/rub/gallop; No lower extremity edema; Peripheral pulses are 2+ bilaterally  ABDOMEN: Nontender to palpation, normoactive bowel sounds, no rebound/guarding; No hepatosplenomegaly  MUSCLOSKELETAL:  Normal gait; no clubbing or cyanosis of digits; no joint swelling or tenderness to palpation  PSYCH: A+O to person, place, and time; affect appropriate  NEUROLOGY: CN 2-12 are intact and symmetric; no gross sensory deficits;   SKIN: No rashes; no palpable lesions    LABS:                        8.6    9.26  )-----------( 136      ( 18 Jan 2025 04:55 )             27.8     01-18    140  |  108  |  5.7[L]  ----------------------------<  115[H]  3.9   |  22.0  |  0.50    Ca    7.2[L]      18 Jan 2025 04:55  Phos  4.2     01-17  Mg     2.2     01-18            Urinalysis Basic - ( 18 Jan 2025 04:55 )    Color: x / Appearance: x / SG: x / pH: x  Gluc: 115 mg/dL / Ketone: x  / Bili: x / Urobili: x   Blood: x / Protein: x / Nitrite: x   Leuk Esterase: x / RBC: x / WBC x   Sq Epi: x / Non Sq Epi: x / Bacteria: x        CAPILLARY BLOOD GLUCOSE      POCT Blood Glucose.: 161 mg/dL (18 Jan 2025 12:40)  POCT Blood Glucose.: 120 mg/dL (18 Jan 2025 08:17)  POCT Blood Glucose.: 162 mg/dL (17 Jan 2025 21:41)  POCT Blood Glucose.: 98 mg/dL (17 Jan 2025 17:38)

## 2025-01-18 NOTE — PROGRESS NOTE ADULT - ASSESSMENT
39y  Female with h/o metastatic breast cancer ER/[place] Neg, HER-2 + on chemotherapy (last dose 12/26/24) (mets to lung, liver, spleen, spine, bone and brain), gastritis w/ intermittent episodes of dark stool (follows w/ Dr. Rosado GI and is on PPI and octreotide daily). Patient presented 1/5 with c/o worsening SOB.  She was seen in ED 1/2/25 for for weakness and SOB at which time she was found to have Hb of 5 requiring PRBC transfusion and positive for Flu A and started on tamiflu and was discharged from the ED on 1/3/25.  Her respiratory status has worsened since then w/ productive cough associated with body aches and chills.  Denies sick contacts but has had many frequent hospital visits. Patient has been afebrile, no leukocytosis. Was placed on BIPAP for Worsening respiratory status. continued on Tamiflu. Vancomycn and Zosyn was added. Blood cultures with MSSA.     Metastatic breast cancer   - last dose of trastuzumab was on 12/26/24.  -All treatment on hold at the moment.  -Follow up with primary oncologist, Dr. Chapa after discharge    Cytopenias   - secondary to malignancy and acute illness and now GIB  - Plt 136k  - S/P multiple transfusions  - GI evaluated for large bloody bowel movement  Suspect rectal bleeding is due to diverticular bleed which are self-limiting - If patient becomes hemodynamically unstable, requiring multiple transfusions,   - CT A/P No evidence of active intraluminal extravasation of contrast.   - GI f/u noted.  no plans for scope.  Previously scoped in Nov. 2024. internal hemorrhoids, gastric erosions, no active bleeding site   - c/w home sub q octreotide and IV protonix  - Continue Amicar 4 grams Q 6 H for bleeding- will titrate according to HGB   -Transfuse if hgb<7.0.    - Pt is requesting GI come back to re evaluate     Hgb 8.6 this am    Respiratory failure   - multifocal pneumonia and flu+  - oseltamivir through 1/10  - Management as per primary team.  - TTE 1/6 with no veg  - Cardiology following- . No MARYANN at this time until stabilizes and source of bleed ascertained    MSSA Bacteremia  - cultures persistently positive. pt afebrile.   --  ID following -On nafcillin   - Would remove Port given persistent Bacteremia   -  Agree with port removal and placement of PICC or midline prior to d/c for outpt chemo     + Noro virus and giardia lamblia    Cont aggressive pain management. Remains on PCA pump.    Will follow                39y  Female with h/o metastatic breast cancer ER/[place] Neg, HER-2 + on chemotherapy (last dose 12/26/24) (mets to lung, liver, spleen, spine, bone and brain), gastritis w/ intermittent episodes of dark stool (follows w/ Dr. Rosado GI and is on PPI and octreotide daily). Patient presented 1/5 with c/o worsening SOB.  She was seen in ED 1/2/25 for for weakness and SOB at which time she was found to have Hb of 5 requiring PRBC transfusion and positive for Flu A and started on tamiflu and was discharged from the ED on 1/3/25.  Her respiratory status has worsened since then w/ productive cough associated with body aches and chills.  Denies sick contacts but has had many frequent hospital visits. Patient has been afebrile, no leukocytosis. Was placed on BIPAP for Worsening respiratory status. continued on Tamiflu. Vancomycn and Zosyn was added. Blood cultures with MSSA.     Metastatic breast cancer   - last dose of trastuzumab was on 12/26/24.  -All treatment on hold at the moment.  -Follow up with primary oncologist, Dr. Chapa after discharge    Cytopenias   - secondary to malignancy and acute illness and now GIB  - Plt 136k  - S/P multiple transfusions  - GI evaluated for large bloody bowel movement  Suspect rectal bleeding is due to diverticular bleed which are self-limiting - If patient becomes hemodynamically unstable, requiring multiple transfusions,   - CT A/P No evidence of active intraluminal extravasation of contrast.   - GI f/u noted.  no plans for scope.  Previously scoped in Nov. 2024. internal hemorrhoids, gastric erosions, no active bleeding site   - c/w home sub q octreotide and IV protonix  - Continue Amicar 4 grams Q 6 H for bleeding- will titrate according to HGB   -Transfuse if hgb<7.0.    - Pt is requesting GI come back to re evaluate     Hgb 8.6 this am  plt 136,000    Respiratory failure   - multifocal pneumonia and flu+  - oseltamivir through 1/10  - Management as per primary team.  - TTE 1/6 with no veg  - Cardiology following- . No MARYANN at this time until stabilizes and source of bleed ascertained    MSSA Bacteremia  - cultures persistently positive. pt afebrile.   --  ID following -On nafcillin   - Would remove Port given persistent Bacteremia   -  Agree with port removal and placement of PICC or midline prior to d/c for outpt chemo     + Noro virus and giardia lamblia    Cont aggressive pain management. Remains on PCA pump.    Will follow

## 2025-01-19 LAB
ALBUMIN SERPL ELPH-MCNC: 2.3 G/DL — LOW (ref 3.3–5.2)
ALP SERPL-CCNC: 132 U/L — HIGH (ref 40–120)
ALT FLD-CCNC: 13 U/L — SIGNIFICANT CHANGE UP
ANION GAP SERPL CALC-SCNC: 11 MMOL/L — SIGNIFICANT CHANGE UP (ref 5–17)
AST SERPL-CCNC: 19 U/L — SIGNIFICANT CHANGE UP
BILIRUB SERPL-MCNC: 0.6 MG/DL — SIGNIFICANT CHANGE UP (ref 0.4–2)
BUN SERPL-MCNC: 4 MG/DL — LOW (ref 8–20)
CALCIUM SERPL-MCNC: 7.8 MG/DL — LOW (ref 8.4–10.5)
CHLORIDE SERPL-SCNC: 102 MMOL/L — SIGNIFICANT CHANGE UP (ref 96–108)
CO2 SERPL-SCNC: 22 MMOL/L — SIGNIFICANT CHANGE UP (ref 22–29)
CREAT SERPL-MCNC: 0.42 MG/DL — LOW (ref 0.5–1.3)
EGFR: 128 ML/MIN/1.73M2 — SIGNIFICANT CHANGE UP
GLUCOSE BLDC GLUCOMTR-MCNC: 108 MG/DL — HIGH (ref 70–99)
GLUCOSE BLDC GLUCOMTR-MCNC: 131 MG/DL — HIGH (ref 70–99)
GLUCOSE BLDC GLUCOMTR-MCNC: 137 MG/DL — HIGH (ref 70–99)
GLUCOSE BLDC GLUCOMTR-MCNC: 95 MG/DL — SIGNIFICANT CHANGE UP (ref 70–99)
GLUCOSE SERPL-MCNC: 182 MG/DL — HIGH (ref 70–99)
HCT VFR BLD CALC: 27.8 % — LOW (ref 34.5–45)
HGB BLD-MCNC: 8.3 G/DL — LOW (ref 11.5–15.5)
MCHC RBC-ENTMCNC: 28.2 PG — SIGNIFICANT CHANGE UP (ref 27–34)
MCHC RBC-ENTMCNC: 29.9 G/DL — LOW (ref 32–36)
MCV RBC AUTO: 94.6 FL — SIGNIFICANT CHANGE UP (ref 80–100)
PLATELET # BLD AUTO: 156 K/UL — SIGNIFICANT CHANGE UP (ref 150–400)
POTASSIUM SERPL-MCNC: 4.3 MMOL/L — SIGNIFICANT CHANGE UP (ref 3.5–5.3)
POTASSIUM SERPL-SCNC: 4.3 MMOL/L — SIGNIFICANT CHANGE UP (ref 3.5–5.3)
PROT SERPL-MCNC: 5.3 G/DL — LOW (ref 6.6–8.7)
RBC # BLD: 2.94 M/UL — LOW (ref 3.8–5.2)
RBC # FLD: 24.9 % — HIGH (ref 10.3–14.5)
SODIUM SERPL-SCNC: 135 MMOL/L — SIGNIFICANT CHANGE UP (ref 135–145)
WBC # BLD: 8.96 K/UL — SIGNIFICANT CHANGE UP (ref 3.8–10.5)
WBC # FLD AUTO: 8.96 K/UL — SIGNIFICANT CHANGE UP (ref 3.8–10.5)

## 2025-01-19 PROCEDURE — 99233 SBSQ HOSP IP/OBS HIGH 50: CPT

## 2025-01-19 RX ORDER — HYDROCORTISONE 1 %
1 CREAM (GRAM) TOPICAL DAILY
Refills: 0 | Status: DISCONTINUED | OUTPATIENT
Start: 2025-01-19 | End: 2025-01-31

## 2025-01-19 RX ORDER — HYDROMORPHONE HYDROCHLORIDE 4 MG/ML
0.5 INJECTION, SOLUTION INTRAMUSCULAR; INTRAVENOUS; SUBCUTANEOUS EVERY 6 HOURS
Refills: 0 | Status: DISCONTINUED | OUTPATIENT
Start: 2025-01-19 | End: 2025-01-21

## 2025-01-19 RX ADMIN — Medication 1 APPLICATION(S): at 16:51

## 2025-01-19 RX ADMIN — PREDNISONE 20 MILLIGRAM(S): 5 TABLET ORAL at 05:25

## 2025-01-19 RX ADMIN — HYDROMORPHONE HYDROCHLORIDE 30 MILLILITER(S): 4 INJECTION, SOLUTION INTRAMUSCULAR; INTRAVENOUS; SUBCUTANEOUS at 07:07

## 2025-01-19 RX ADMIN — PREGABALIN CAPSULES, CV 200 MILLIGRAM(S): 225 CAPSULE ORAL at 21:18

## 2025-01-19 RX ADMIN — Medication 4 MILLILITER(S): at 09:06

## 2025-01-19 RX ADMIN — AMINOCAPROIC ACID 4 GRAM(S): 1000 TABLET ORAL at 16:50

## 2025-01-19 RX ADMIN — Medication 4 MILLILITER(S): at 21:34

## 2025-01-19 RX ADMIN — Medication 4 MILLILITER(S): at 04:55

## 2025-01-19 RX ADMIN — DEXTROMETHORPHAN HBR AND GUAIFENESIN ORAL SOLUTION 10 MILLILITER(S): 10; 100 LIQUID ORAL at 21:16

## 2025-01-19 RX ADMIN — ANTISEPTIC SURGICAL SCRUB 1 APPLICATION(S): 0.04 SOLUTION TOPICAL at 11:03

## 2025-01-19 RX ADMIN — HYDROMORPHONE HYDROCHLORIDE 30 MILLILITER(S): 4 INJECTION, SOLUTION INTRAMUSCULAR; INTRAVENOUS; SUBCUTANEOUS at 19:33

## 2025-01-19 RX ADMIN — METHADONE HYDROCHLORIDE 5 MILLIGRAM(S): 5 SOLUTION ORAL at 05:23

## 2025-01-19 RX ADMIN — DEXTROMETHORPHAN HBR AND GUAIFENESIN ORAL SOLUTION 10 MILLILITER(S): 10; 100 LIQUID ORAL at 06:22

## 2025-01-19 RX ADMIN — PANTOPRAZOLE 40 MILLIGRAM(S): 20 TABLET, DELAYED RELEASE ORAL at 05:26

## 2025-01-19 RX ADMIN — ACETAMINOPHEN, DIPHENHYDRAMINE HCL, PHENYLEPHRINE HCL 3 MILLIGRAM(S): 325; 25; 5 TABLET ORAL at 21:17

## 2025-01-19 RX ADMIN — Medication 1 APPLICATION(S): at 16:50

## 2025-01-19 RX ADMIN — OCTREOTIDE ACETATE 100 MICROGRAM(S): 1000 INJECTION INTRAVENOUS; SUBCUTANEOUS at 16:49

## 2025-01-19 RX ADMIN — NAFCILLIN INJECTION 200 GRAM(S): 2 POWDER, FOR SOLUTION INTRAMUSCULAR; INTRAMUSCULAR; INTRAVENOUS at 05:25

## 2025-01-19 RX ADMIN — ONDANSETRON 4 MILLIGRAM(S): 4 TABLET, ORALLY DISINTEGRATING ORAL at 13:25

## 2025-01-19 RX ADMIN — Medication 4 MILLILITER(S): at 16:55

## 2025-01-19 RX ADMIN — OCTREOTIDE ACETATE 100 MICROGRAM(S): 1000 INJECTION INTRAVENOUS; SUBCUTANEOUS at 05:26

## 2025-01-19 RX ADMIN — PREGABALIN CAPSULES, CV 200 MILLIGRAM(S): 225 CAPSULE ORAL at 13:24

## 2025-01-19 RX ADMIN — HYDROMORPHONE HYDROCHLORIDE 0.5 MILLIGRAM(S): 4 INJECTION, SOLUTION INTRAMUSCULAR; INTRAVENOUS; SUBCUTANEOUS at 15:30

## 2025-01-19 RX ADMIN — NAFCILLIN INJECTION 200 GRAM(S): 2 POWDER, FOR SOLUTION INTRAMUSCULAR; INTRAMUSCULAR; INTRAVENOUS at 21:17

## 2025-01-19 RX ADMIN — PANTOPRAZOLE 40 MILLIGRAM(S): 20 TABLET, DELAYED RELEASE ORAL at 16:49

## 2025-01-19 RX ADMIN — HYDROMORPHONE HYDROCHLORIDE 0.5 MILLIGRAM(S): 4 INJECTION, SOLUTION INTRAMUSCULAR; INTRAVENOUS; SUBCUTANEOUS at 15:04

## 2025-01-19 RX ADMIN — AMINOCAPROIC ACID 4 GRAM(S): 1000 TABLET ORAL at 11:01

## 2025-01-19 RX ADMIN — NAFCILLIN INJECTION 200 GRAM(S): 2 POWDER, FOR SOLUTION INTRAMUSCULAR; INTRAMUSCULAR; INTRAVENOUS at 11:02

## 2025-01-19 RX ADMIN — METHADONE HYDROCHLORIDE 5 MILLIGRAM(S): 5 SOLUTION ORAL at 13:23

## 2025-01-19 RX ADMIN — DEXTROSE MONOHYDRATE, SODIUM CHLORIDE, AND POTASSIUM CHLORIDE 100 MILLILITER(S): 50; 2.25; 2.24 INJECTION, SOLUTION INTRAVENOUS at 11:03

## 2025-01-19 RX ADMIN — NAFCILLIN INJECTION 200 GRAM(S): 2 POWDER, FOR SOLUTION INTRAMUSCULAR; INTRAMUSCULAR; INTRAVENOUS at 13:23

## 2025-01-19 RX ADMIN — Medication 1.25 MILLIGRAM(S): at 04:56

## 2025-01-19 RX ADMIN — NAFCILLIN INJECTION 200 GRAM(S): 2 POWDER, FOR SOLUTION INTRAMUSCULAR; INTRAMUSCULAR; INTRAVENOUS at 16:50

## 2025-01-19 RX ADMIN — DEXTROMETHORPHAN HBR AND GUAIFENESIN ORAL SOLUTION 10 MILLILITER(S): 10; 100 LIQUID ORAL at 11:26

## 2025-01-19 RX ADMIN — Medication 1.25 MILLIGRAM(S): at 09:05

## 2025-01-19 RX ADMIN — ALBENDAZOLE 400 MILLIGRAM(S): 200 TABLET, FILM COATED ORAL at 11:02

## 2025-01-19 RX ADMIN — Medication 1.25 MILLIGRAM(S): at 21:34

## 2025-01-19 RX ADMIN — Medication 1.25 MILLIGRAM(S): at 16:55

## 2025-01-19 RX ADMIN — PREGABALIN CAPSULES, CV 200 MILLIGRAM(S): 225 CAPSULE ORAL at 05:24

## 2025-01-19 RX ADMIN — DEXTROSE MONOHYDRATE, SODIUM CHLORIDE, AND POTASSIUM CHLORIDE 100 MILLILITER(S): 50; 2.25; 2.24 INJECTION, SOLUTION INTRAVENOUS at 21:00

## 2025-01-19 RX ADMIN — AMINOCAPROIC ACID 4 GRAM(S): 1000 TABLET ORAL at 21:17

## 2025-01-19 RX ADMIN — Medication 1 APPLICATION(S): at 05:26

## 2025-01-19 RX ADMIN — NAFCILLIN INJECTION 200 GRAM(S): 2 POWDER, FOR SOLUTION INTRAMUSCULAR; INTRAMUSCULAR; INTRAVENOUS at 02:23

## 2025-01-19 RX ADMIN — AMINOCAPROIC ACID 4 GRAM(S): 1000 TABLET ORAL at 05:24

## 2025-01-19 RX ADMIN — Medication 100 MILLIGRAM(S): at 21:17

## 2025-01-19 RX ADMIN — Medication 100 MILLIGRAM(S): at 02:28

## 2025-01-19 RX ADMIN — ACETAMINOPHEN 650 MILLIGRAM(S): 160 SUSPENSION ORAL at 13:23

## 2025-01-19 RX ADMIN — ACETAMINOPHEN 650 MILLIGRAM(S): 160 SUSPENSION ORAL at 14:00

## 2025-01-19 NOTE — PROGRESS NOTE ADULT - ASSESSMENT
39y  Female with h/o metastatic breast cancer ER/[place] Neg, HER-2 + on chemotherapy (last dose 12/26/24) (mets to lung, liver, spleen, spine, bone and brain), gastritis w/ intermittent episodes of dark stool (follows w/ Dr. Rosado GI and is on PPI and octreotide daily). Patient presented 1/5 with c/o worsening SOB.  She was seen in ED 1/2/25 for for weakness and SOB at which time she was found to have Hb of 5 requiring PRBC transfusion and positive for Flu A and started on tamiflu and was discharged from the ED on 1/3/25.  Her respiratory status has worsened since then w/ productive cough associated with body aches and chills. Admitted for sepsis ,acute hypoxic respiratory failure with flu A. s/p HFNC for worsening respiratory status. S/P course of Tamiflu. Vancomycin and Zosyn was added for possible superimposed bacterial pna. Blood cultures with MSSA.  Cardiology consultation requested for evaluation of endocarditis. Blood cultures 1/5, 1/7, 1/9 reporting MSSA , Repeat blood cultures ngtd on 1/11/25, Sputum Cx 1/6 Staphylococcus aureus. MARYANN hold for active gib requring transfusion. Pt had large BM likley diverticular bleed. Had recent scopy. Gi following. ct abd/pelvis w/iv contrast no active bleed (1/13). Urine Cx 1/5 reporting 10k - 49k Escherichia coli  of ? significance  since UA not concerning for UTI.  RVP/COVID 19 PCR + Influenza A.GI PCR  positive norovirus,Giardia.        #Nororvirus,Giadia gastroenteritis  -GI PCR  positive norovirus,Giadia  -Albendazole per ID (dc on 01/22 -- 5 day course)  -ID on board, recs noted  -fluids as needed, encourage po hydration    #Acute blood loss anemia   #GIB  -s/p multiple transfusions during this hospital stay  -goal hgb of 7   -CTA abd/pelvis  no active bleed   -PPI/OCTRIO  -GI eval with no plan for scopes 2/2 recently done.  Still with bleeding.  reed GI history, ? GAVE disease per GI notes Has had multiple EGD/colonoscopies in past as well.   -hem/onc following  -hem/onc started Amicar on1/16      #Thrombocytopenia  -likely from chemo/sepsis  -will monitor cbc        #Sepsis 2/2 Flu A w/ superimposed multifocal PNA  #Acute hypoxic respiratory failure 2/2 Flu A w/ superimposed multifocal PNA   - on NC, comfortable  - persistently positive RVP for flu A  - s/p  tamiflu   - repeat CT 1/8 shows improved pleural effusion but worsening GGOs, unclear if infectious or malignant etiology  - repeat CT 1/9 with similar findings  - repeat CT next week to evaluate for improvement of infiltrates  - po steroid tapering dose  - c/w abx per ID recs  - sputum clx growing moderate staph aureus   - TTE EF 65-70%, no WMA    #MSSA bacteremia  - blood clx positive 1/5, repeat bl c/s (1/11) ngtd  - ID following  - c/w nafcillin  per id (will discuss total course length with ID)  - MARYANN on hold for gib. f/u cardio rec  -f/u ID REC        #Chronic normocytic anemia 2/2 metastatic breast cancer on chemo and 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   #Thrombocytopenia likely 2/2 sepsis   #Breast cancer ER/[place] Neg, HER-2 pos on chemotherapy w/ mets to lung, liver, spleen, spine, bone and brain  - Baseline Hb ranges 8-9    - s/p prbc transfusion on 1/2, 1/8 and 1/9,1/11,1/13,1/14  - trend CBC, transfuse for hgb <7  - c/w home sub q octreotide and IV protonix  - Monitor CBC and transfuse for Hb<7 and plts<20K in setting of sepsis   - [facility] following  - monitor on tele and     #Breast cancer ER/[place] Neg, HER-2 pos on chemotherapy w/ mets to lung, liver, spleen, spine, bone and brain  - c/w pregabalin, methylphenidate (confirmed on ISTOP Reference #: [id number])  - Reports being on methadone for pain but last 30 day script on istop dispensed 09/19/24  -on dilaudid pca pump -- will adjust demand dosing to 0.5mg (lockout 8mins) with 0.1mg qhourly running  - pain management following    dvt ppx scd  disposition: still medically active, anticipate 3-4 day LOS

## 2025-01-19 NOTE — PROGRESS NOTE ADULT - ASSESSMENT
39y  Female with h/o metastatic breast cancer ER/[place] Neg, HER-2 + on chemotherapy (last dose 12/26/24) (mets to lung, liver, spleen, spine, bone and brain), gastritis w/ intermittent episodes of dark stool (follows w/ Dr. Rosado GI and is on PPI and octreotide daily). Patient presented 1/5 with c/o worsening SOB.  She was seen in ED 1/2/25 for for weakness and SOB at which time she was found to have Hb of 5 requiring PRBC transfusion and positive for Flu A and started on tamiflu and was discharged from the ED on 1/3/25.  Her respiratory status has worsened since then w/ productive cough associated with body aches and chills.  Denies sick contacts but has had many frequent hospital visits. Patient has been afebrile, no leukocytosis. Was placed on BIPAP for Worsening respiratory status. continued on Tamiflu. Vancomycn and Zosyn was added. Blood cultures with MSSA.     Metastatic breast cancer   - last dose of trastuzumab was on 12/26/24.  -All treatment on hold at the moment.  -Follow up with primary oncologist, Dr. Chapa after discharge    Cytopenias   - secondary to malignancy and acute illness and now GIB  - Plt 136k  - S/P multiple transfusions  - GI evaluated for large bloody bowel movement  Suspect rectal bleeding is due to diverticular bleed which are self-limiting - If patient becomes hemodynamically unstable, requiring multiple transfusions,   - CT A/P No evidence of active intraluminal extravasation of contrast.   - GI f/u noted.  no plans for scope.  Previously scoped in Nov. 2024. internal hemorrhoids, gastric erosions, no active bleeding site   - c/w home sub q octreotide and IV protonix  - Continue Amicar 4 grams Q 6 H for bleeding- will titrate according to HGB   -Transfuse if hgb<7.0.    - Pt is requesting GI come back to re evaluate     Hgb 8.3 this am  plt 156,000    Respiratory failure   - multifocal pneumonia and flu+  - oseltamivir through 1/10  - Management as per primary team.  - TTE 1/6 with no veg  - Cardiology following- . No MARYANN at this time until stabilizes and source of bleed ascertained    MSSA Bacteremia  - cultures persistently positive. pt afebrile.   --  ID following -On nafcillin   - Would remove Port given persistent Bacteremia   -  Agree with port removal and placement of PICC or midline prior to d/c for outpt chemo     + Noro virus and giardia lamblia    Cont aggressive pain management. Remains on PCA pump.    Will follow                39y  Female with h/o metastatic breast cancer ER/[place] Neg, HER-2 + on chemotherapy (last dose 12/26/24) (mets to lung, liver, spleen, spine, bone and brain), gastritis w/ intermittent episodes of dark stool (follows w/ Dr. Rosado GI and is on PPI and octreotide daily). Patient presented 1/5 with c/o worsening SOB.  She was seen in ED 1/2/25 for for weakness and SOB at which time she was found to have Hb of 5 requiring PRBC transfusion and positive for Flu A and started on tamiflu and was discharged from the ED on 1/3/25.  Her respiratory status has worsened since then w/ productive cough associated with body aches and chills.  Denies sick contacts but has had many frequent hospital visits. Patient has been afebrile, no leukocytosis. Was placed on BIPAP for Worsening respiratory status. continued on Tamiflu. Vancomycn and Zosyn was added. Blood cultures with MSSA.     Metastatic breast cancer   - last dose of trastuzumab was on 12/26/24.  -All treatment on hold at the moment.  -Follow up with primary oncologist, Dr. Chapa after discharge    Cytopenias   - secondary to malignancy and acute illness and now GIB  - Plt 136k  - S/P multiple transfusions  - GI evaluated for large bloody bowel movement  Suspect rectal bleeding is due to diverticular bleed which are self-limiting - If patient becomes hemodynamically unstable, requiring multiple transfusions,   - CT A/P No evidence of active intraluminal extravasation of contrast.   - GI f/u noted.  no plans for scope.  Previously scoped in Nov. 2024. internal hemorrhoids, gastric erosions, no active bleeding site   - c/w home sub q octreotide and IV protonix  - Continue Amicar 4 grams Q 6 H for bleeding- will titrate according to HGB   -Transfuse if hgb<7.0.    - Pt is requesting GI come back to re evaluate     Hgb 8.3 this am  plt 156,000    Respiratory failure   - multifocal pneumonia and flu+  - oseltamivir through 1/10  - Management as per primary team.  - TTE 1/6 with no veg  - Cardiology following- . No MARYANN at this time until stabilizes and source of bleed ascertained    MSSA Bacteremia  - cultures persistently positive. pt afebrile.   --  ID following -On nafcillin   -s/p port removal  -  PICC or midline prior to d/c for outpt chemo     + Noro virus and giardia lamblia    Cont aggressive pain management. Remains on PCA pump.    Will follow

## 2025-01-19 NOTE — PROGRESS NOTE ADULT - SUBJECTIVE AND OBJECTIVE BOX
[facility] Division of Hospital Medicine    SUBJECTIVE / OVERNIGHT EVENTS:    pt seen and examined at bedside  pain is imprved with adjusted PCA dosing  still having scant blood in stool but improving  tolerating po diet  naeon    MEDICATIONS  (STANDING):  acetylcysteine 10%  Inhalation 4 milliLiter(s) Inhalation every 6 hours  albendazole 400 milliGRAM(s) Oral daily  aminocaproic acid Tablet 4 Gram(s) Oral every 6 hours  chlorhexidine 2% Cloths 1 Application(s) Topical daily  dextrose 5%. 1000 milliLiter(s) (50 mL/Hr) IV Continuous <Continuous>  dextrose 5%. 1000 milliLiter(s) (100 mL/Hr) IV Continuous <Continuous>  dextrose 50% Injectable 25 Gram(s) IV Push once  dextrose 50% Injectable 12.5 Gram(s) IV Push once  dextrose 50% Injectable 25 Gram(s) IV Push once  glucagon  Injectable 1 milliGRAM(s) IntraMuscular once  hydrocortisone 2.5% Rectal Cream 1 Application(s) Rectal two times a day  HYDROmorphone PCA (1 mG/mL) 30 milliLiter(s) PCA Continuous PCA Continuous  insulin lispro (ADMELOG) corrective regimen sliding scale   SubCutaneous three times a day before meals  levalbuterol Inhalation 1.25 milliGRAM(s) Inhalation every 6 hours  methadone    Tablet 5 milliGRAM(s) Oral <User Schedule>  nafcillin  IVPB 2 Gram(s) IV Intermittent every 4 hours  octreotide  Injectable 100 MICROGram(s) SubCutaneous two times a day  pantoprazole  Injectable 40 milliGRAM(s) IV Push every 12 hours  predniSONE   Tablet 20 milliGRAM(s) Oral daily  pregabalin 200 milliGRAM(s) Oral every 8 hours  sodium chloride 0.9% with potassium chloride 20 mEq/L 1000 milliLiter(s) (100 mL/Hr) IV Continuous <Continuous>    MEDICATIONS  (PRN):  acetaminophen     Tablet .. 650 milliGRAM(s) Oral every 6 hours PRN Temp greater or equal to 38C (100.4F), Mild Pain (1 - 3)  aluminum hydroxide/magnesium hydroxide/simethicone Suspension 30 milliLiter(s) Oral every 4 hours PRN Dyspepsia  benzonatate 100 milliGRAM(s) Oral every 8 hours PRN Cough  dextrose Oral Gel 15 Gram(s) Oral once PRN Blood Glucose LESS THAN 70 milliGRAM(s)/deciliter  guaifenesin/dextromethorphan Oral Liquid 10 milliLiter(s) Oral every 4 hours PRN Cough  melatonin 3 milliGRAM(s) Oral at bedtime PRN Insomnia  naloxone Injectable 0.1 milliGRAM(s) IV Push every 3 minutes PRN For ANY of the following changes in patient status:  A. RR LESS THAN 10 breaths per minute, B. Oxygen saturation LESS THAN 90%, C. Sedation score of 6  ondansetron Injectable 4 milliGRAM(s) IV Push every 8 hours PRN Nausea and/or Vomiting        I&O's Summary    18 Jan 2025 07:01  -  19 Jan 2025 07:00  --------------------------------------------------------  IN: 2730 mL / OUT: 120 mL / NET: 2610 mL        PHYSICAL EXAM:  Vital Signs Last 24 Hrs  T(C): 36.6 (19 Jan 2025 08:58), Max: 36.8 (19 Jan 2025 00:02)  T(F): 97.9 (19 Jan 2025 08:58), Max: 98.2 (19 Jan 2025 00:02)  HR: 83 (19 Jan 2025 10:00) (74 - 91)  BP: 107/74 (19 Jan 2025 10:00) (96/54 - 123/78)  BP(mean): 84 (19 Jan 2025 10:00) (67 - 89)  RR: 18 (19 Jan 2025 10:00) (16 - 19)  SpO2: 100% (19 Jan 2025 10:00) (99% - 100%)    Parameters below as of 19 Jan 2025 10:00  Patient On (Oxygen Delivery Method): nasal cannula  O2 Flow (L/min): 2          CONSTITUTIONAL: NAD, well-groomed  ENMT: Moist oral mucosa, no pharyngeal injection or exudates; normal dentition  RESPIRATORY: Normal respiratory effort; lungs are clear to auscultation bilaterally  CARDIOVASCULAR: Regular rate and rhythm, normal S1 and S2, no murmur/rub/gallop; No lower extremity edema; Peripheral pulses are 2+ bilaterally  right chest wall withsutures (removed chemoport)  ABDOMEN: Nontender to palpation, normoactive bowel sounds, no rebound/guarding; No hepatosplenomegaly  MUSCLOSKELETAL:  Normal gait; no clubbing or cyanosis of digits; no joint swelling or tenderness to palpation  PSYCH: A+O to person, place, and time; affect appropriate  NEUROLOGY: CN 2-12 are intact and symmetric; no gross sensory deficits;   SKIN: No rashes; no palpable lesions    LABS:                        8.3    8.96  )-----------( 156      ( 19 Jan 2025 07:18 )             27.8     01-19    135  |  102  |  4.0[L]  ----------------------------<  182[H]  4.3   |  22.0  |  0.42[L]    Ca    7.8[L]      19 Jan 2025 07:18  Mg     2.2     01-18    TPro  5.3[L]  /  Alb  2.3[L]  /  TBili  0.6  /  DBili  x   /  AST  19  /  ALT  13  /  AlkPhos  132[H]  01-19          Urinalysis Basic - ( 19 Jan 2025 07:18 )    Color: x / Appearance: x / SG: x / pH: x  Gluc: 182 mg/dL / Ketone: x  / Bili: x / Urobili: x   Blood: x / Protein: x / Nitrite: x   Leuk Esterase: x / RBC: x / WBC x   Sq Epi: x / Non Sq Epi: x / Bacteria: x        Culture - Blood (collected 17 Jan 2025 13:02)  Source: .Blood BLOOD  Preliminary Report (18 Jan 2025 19:02):    No growth at 24 hours    Culture - Blood (collected 17 Jan 2025 12:58)  Source: .Blood BLOOD  Preliminary Report (18 Jan 2025 19:02):    No growth at 24 hours      CAPILLARY BLOOD GLUCOSE      POCT Blood Glucose.: 137 mg/dL (19 Jan 2025 09:31)  POCT Blood Glucose.: 147 mg/dL (18 Jan 2025 21:29)  POCT Blood Glucose.: 94 mg/dL (18 Jan 2025 18:09)  POCT Blood Glucose.: 161 mg/dL (18 Jan 2025 12:40)

## 2025-01-19 NOTE — PROGRESS NOTE ADULT - SUBJECTIVE AND OBJECTIVE BOX
39y  Female with h/o metastatic breast cancer ER/DE Neg, HER-2 + on chemotherapy (last dose 12/26/24) (mets to lung, liver, spleen, spine, bone and brain), gastritis w/ intermittent episodes of dark stool (follows w/ Dr. Alonzo BISHOP and is on PPI and octreotide daily). Patient presented 1/5 with c/o worsening SOB.  She was seen in ED 1/2/25 for for weakness and SOB at which time she was found to have Hb of 5 requiring PRBC transfusion and positive for Flu A and started on tamiflu and was discharged from the ED on 1/3/25.  Her respiratory status has worsened since then w/ productive cough associated with body aches and chills.  Denies sick contacts but has had many frequent hospital visits. Patient has been afebrile, no leukocytosis. Was placed on BIPAP for Worsening respiratory status. continued on Tamiflu. Vancomycn and Zosyn was added. Blood cultures with MSSA.     1/17/25 Father concerned GI has not been back to see pt and she is being transfused almost daily     1/18/2025 - c/o cough this and bloody BM, ON had R sided chest pain , now better        MEDICATIONS  (STANDING):  acetylcysteine 10%  Inhalation 4 milliLiter(s) Inhalation every 6 hours  albendazole 400 milliGRAM(s) Oral daily  aminocaproic acid Tablet 4 Gram(s) Oral every 6 hours  chlorhexidine 2% Cloths 1 Application(s) Topical daily  dextrose 5%. 1000 milliLiter(s) (50 mL/Hr) IV Continuous <Continuous>  dextrose 5%. 1000 milliLiter(s) (100 mL/Hr) IV Continuous <Continuous>  dextrose 50% Injectable 25 Gram(s) IV Push once  dextrose 50% Injectable 12.5 Gram(s) IV Push once  dextrose 50% Injectable 25 Gram(s) IV Push once  glucagon  Injectable 1 milliGRAM(s) IntraMuscular once  hydrocortisone 2.5% Rectal Cream 1 Application(s) Rectal two times a day  HYDROmorphone PCA (1 mG/mL) 30 milliLiter(s) PCA Continuous PCA Continuous  insulin lispro (ADMELOG) corrective regimen sliding scale   SubCutaneous three times a day before meals  levalbuterol Inhalation 1.25 milliGRAM(s) Inhalation every 6 hours  methadone    Tablet 5 milliGRAM(s) Oral <User Schedule>  nafcillin  IVPB 2 Gram(s) IV Intermittent every 4 hours  octreotide  Injectable 100 MICROGram(s) SubCutaneous two times a day  pantoprazole  Injectable 40 milliGRAM(s) IV Push every 12 hours  predniSONE   Tablet 20 milliGRAM(s) Oral daily  pregabalin 200 milliGRAM(s) Oral every 8 hours  sodium chloride 0.9% with potassium chloride 20 mEq/L 1000 milliLiter(s) (100 mL/Hr) IV Continuous <Continuous>    MEDICATIONS  (PRN):  acetaminophen     Tablet .. 650 milliGRAM(s) Oral every 6 hours PRN Temp greater or equal to 38C (100.4F), Mild Pain (1 - 3)  aluminum hydroxide/magnesium hydroxide/simethicone Suspension 30 milliLiter(s) Oral every 4 hours PRN Dyspepsia  benzonatate 100 milliGRAM(s) Oral every 8 hours PRN Cough  dextrose Oral Gel 15 Gram(s) Oral once PRN Blood Glucose LESS THAN 70 milliGRAM(s)/deciliter  guaifenesin/dextromethorphan Oral Liquid 10 milliLiter(s) Oral every 4 hours PRN Cough  melatonin 3 milliGRAM(s) Oral at bedtime PRN Insomnia  naloxone Injectable 0.1 milliGRAM(s) IV Push every 3 minutes PRN For ANY of the following changes in patient status:  A. RR LESS THAN 10 breaths per minute, B. Oxygen saturation LESS THAN 90%, C. Sedation score of 6  ondansetron Injectable 4 milliGRAM(s) IV Push every 8 hours PRN Nausea and/or Vomiting      ICU Vital Signs Last 24 Hrs  T(C): 36.6 (19 Jan 2025 08:58), Max: 36.8 (19 Jan 2025 00:02)  T(F): 97.9 (19 Jan 2025 08:58), Max: 98.2 (19 Jan 2025 00:02)  HR: 91 (19 Jan 2025 06:00) (74 - 98)  BP: 101/63 (19 Jan 2025 06:00) (96/54 - 123/78)  BP(mean): 76 (19 Jan 2025 06:00) (67 - 89)  ABP: --  ABP(mean): --  RR: 18 (19 Jan 2025 06:00) (16 - 18)  SpO2: 100% (19 Jan 2025 06:00) (99% - 100%)    O2 Parameters below as of 19 Jan 2025 06:00  Patient On (Oxygen Delivery Method): nasal cannula  O2 Flow (L/min): 2            PE:  NAD  On O2 NC  bilateral rales  abd soft, nd, nt  a/o x 3      CBC                          8.3    8.96  )-----------( 156      ( 19 Jan 2025 07:18 )             27.8                           8.6    9.26  )-----------( 136      ( 18 Jan 2025 04:55 )             27.8                             6.4    9.75  )-----------( 130      ( 17 Jan 2025 06:45 )             20.7                             7.2    6.88  )-----------( 151      ( 16 Jan 2025 06:10 )             23.2                           7.9    10.16 )-----------( 123      ( 15 Cristhian 2025 06:10 )             25.4           Chem:       01-17    139  |  104  |  8.7  ----------------------------<  163[H]  2.9[LL]   |  26.0  |  0.70    Ca    7.3[L]      17 Jan 2025 06:45  Phos  4.2     01-17  Mg     1.5     01-17    TPro  5.0[L]  /  Alb  2.1[L]  /  TBili  0.8  /  DBili  0.3  /  AST  17  /  ALT  13  /  AlkPhos  127[H]  01-16      01-15    138  |  102  |  12.3  ----------------------------<  135[H]  3.6   |  25.0  |  0.67    Ca    8.1[L]      15 Cristhian 2025 06:10      01-14    141  |  103  |  13.4  ----------------------------<  127[H]  3.4[L]   |  30.0[H]  |  0.54    Ca    8.1[L]      14 Jan 2025 04:48        01-13    141  |  103  |  20.3[H]  ----------------------------<  137[H]  3.8   |  27.0  |  0.47[L]    Ca    8.2[L]      13 Jan 2025 06:10        01-09    138  |  100  |  21.3[H]  ----------------------------<  152[H]  3.5   |  29.0  |  0.54    Ca    7.0[L]      09 Jan 2025 04:57  Mg     1.9     01-08    TPro  5.0[L]  /  Alb  2.5[L]  /  TBili  1.8  /  DBili  1.3[H]  /  AST  24  /  ALT  23  /  AlkPhos  238[H]  01-09 01-08    140  |  103  |  17.8  ----------------------------<  112[H]  3.5   |  27.0  |  0.56    Ca    6.8[L]      08 Jan 2025 06:20  Mg     1.9     01-08    TPro  5.4[L]  /  Alb  2.5[L]  /  TBili  1.9  /  DBili  1.3[H]  /  AST  24  /  ALT  25  /  AlkPhos  240[H]  01-08          ( 01-07-25 @ 06:15 )    139  |  106  |  17.5  ----------------------------<  394[H]  3.3[L]   |  20.0[L]  |  0.61    Liver Functions: ( 01-07-25 @ 06:15 )  Alb: 2.6 g/dL / Pro: 5.5 g/dL / ALK PHOS: 228 U/L / ALT: 22 U/L / AST: 16 U/L / GGT: x                   39y  Female with h/o metastatic breast cancer ER/ME Neg, HER-2 + on chemotherapy (last dose 12/26/24) (mets to lung, liver, spleen, spine, bone and brain), gastritis w/ intermittent episodes of dark stool (follows w/ Dr. Alonzo BISHOP and is on PPI and octreotide daily). Patient presented 1/5 with c/o worsening SOB.  She was seen in ED 1/2/25 for for weakness and SOB at which time she was found to have Hb of 5 requiring PRBC transfusion and positive for Flu A and started on tamiflu and was discharged from the ED on 1/3/25.  Her respiratory status has worsened since then w/ productive cough associated with body aches and chills.  Denies sick contacts but has had many frequent hospital visits. Patient has been afebrile, no leukocytosis. Was placed on BIPAP for Worsening respiratory status. continued on Tamiflu. Vancomycn and Zosyn was added. Blood cultures with MSSA.     1/17/25 Father concerned GI has not been back to see pt and she is being transfused almost daily     1/19 - afebrile, receiving neb this am, c/o r sided chest/back pain, using PCA pump        MEDICATIONS  (STANDING):  acetylcysteine 10%  Inhalation 4 milliLiter(s) Inhalation every 6 hours  albendazole 400 milliGRAM(s) Oral daily  aminocaproic acid Tablet 4 Gram(s) Oral every 6 hours  chlorhexidine 2% Cloths 1 Application(s) Topical daily  dextrose 5%. 1000 milliLiter(s) (50 mL/Hr) IV Continuous <Continuous>  dextrose 5%. 1000 milliLiter(s) (100 mL/Hr) IV Continuous <Continuous>  dextrose 50% Injectable 25 Gram(s) IV Push once  dextrose 50% Injectable 12.5 Gram(s) IV Push once  dextrose 50% Injectable 25 Gram(s) IV Push once  glucagon  Injectable 1 milliGRAM(s) IntraMuscular once  hydrocortisone 2.5% Rectal Cream 1 Application(s) Rectal two times a day  HYDROmorphone PCA (1 mG/mL) 30 milliLiter(s) PCA Continuous PCA Continuous  insulin lispro (ADMELOG) corrective regimen sliding scale   SubCutaneous three times a day before meals  levalbuterol Inhalation 1.25 milliGRAM(s) Inhalation every 6 hours  methadone    Tablet 5 milliGRAM(s) Oral <User Schedule>  nafcillin  IVPB 2 Gram(s) IV Intermittent every 4 hours  octreotide  Injectable 100 MICROGram(s) SubCutaneous two times a day  pantoprazole  Injectable 40 milliGRAM(s) IV Push every 12 hours  predniSONE   Tablet 20 milliGRAM(s) Oral daily  pregabalin 200 milliGRAM(s) Oral every 8 hours  sodium chloride 0.9% with potassium chloride 20 mEq/L 1000 milliLiter(s) (100 mL/Hr) IV Continuous <Continuous>    MEDICATIONS  (PRN):  acetaminophen     Tablet .. 650 milliGRAM(s) Oral every 6 hours PRN Temp greater or equal to 38C (100.4F), Mild Pain (1 - 3)  aluminum hydroxide/magnesium hydroxide/simethicone Suspension 30 milliLiter(s) Oral every 4 hours PRN Dyspepsia  benzonatate 100 milliGRAM(s) Oral every 8 hours PRN Cough  dextrose Oral Gel 15 Gram(s) Oral once PRN Blood Glucose LESS THAN 70 milliGRAM(s)/deciliter  guaifenesin/dextromethorphan Oral Liquid 10 milliLiter(s) Oral every 4 hours PRN Cough  melatonin 3 milliGRAM(s) Oral at bedtime PRN Insomnia  naloxone Injectable 0.1 milliGRAM(s) IV Push every 3 minutes PRN For ANY of the following changes in patient status:  A. RR LESS THAN 10 breaths per minute, B. Oxygen saturation LESS THAN 90%, C. Sedation score of 6  ondansetron Injectable 4 milliGRAM(s) IV Push every 8 hours PRN Nausea and/or Vomiting      ICU Vital Signs Last 24 Hrs  T(C): 36.6 (19 Jan 2025 08:58), Max: 36.8 (19 Jan 2025 00:02)  T(F): 97.9 (19 Jan 2025 08:58), Max: 98.2 (19 Jan 2025 00:02)  HR: 91 (19 Jan 2025 06:00) (74 - 98)  BP: 101/63 (19 Jan 2025 06:00) (96/54 - 123/78)  BP(mean): 76 (19 Jan 2025 06:00) (67 - 89)  ABP: --  ABP(mean): --  RR: 18 (19 Jan 2025 06:00) (16 - 18)  SpO2: 100% (19 Jan 2025 06:00) (99% - 100%)    O2 Parameters below as of 19 Jan 2025 06:00  Patient On (Oxygen Delivery Method): nasal cannula  O2 Flow (L/min): 2            PE:  NAD  On O2 NC  bilateral rales  abd soft, nd, nt  a/o x 3      CBC                          8.3    8.96  )-----------( 156      ( 19 Jan 2025 07:18 )             27.8                           8.6    9.26  )-----------( 136      ( 18 Jan 2025 04:55 )             27.8                             6.4    9.75  )-----------( 130      ( 17 Jan 2025 06:45 )             20.7                             7.2    6.88  )-----------( 151      ( 16 Jan 2025 06:10 )             23.2                           7.9    10.16 )-----------( 123      ( 15 Cristhian 2025 06:10 )             25.4           Chem:       01-17    139  |  104  |  8.7  ----------------------------<  163[H]  2.9[LL]   |  26.0  |  0.70    Ca    7.3[L]      17 Jan 2025 06:45  Phos  4.2     01-17  Mg     1.5     01-17    TPro  5.0[L]  /  Alb  2.1[L]  /  TBili  0.8  /  DBili  0.3  /  AST  17  /  ALT  13  /  AlkPhos  127[H]  01-16      01-15    138  |  102  |  12.3  ----------------------------<  135[H]  3.6   |  25.0  |  0.67    Ca    8.1[L]      15 Cristhian 2025 06:10      01-14    141  |  103  |  13.4  ----------------------------<  127[H]  3.4[L]   |  30.0[H]  |  0.54    Ca    8.1[L]      14 Jan 2025 04:48        01-13    141  |  103  |  20.3[H]  ----------------------------<  137[H]  3.8   |  27.0  |  0.47[L]    Ca    8.2[L]      13 Jan 2025 06:10        01-09    138  |  100  |  21.3[H]  ----------------------------<  152[H]  3.5   |  29.0  |  0.54    Ca    7.0[L]      09 Jan 2025 04:57  Mg     1.9     01-08    TPro  5.0[L]  /  Alb  2.5[L]  /  TBili  1.8  /  DBili  1.3[H]  /  AST  24  /  ALT  23  /  AlkPhos  238[H]  01-09 01-08    140  |  103  |  17.8  ----------------------------<  112[H]  3.5   |  27.0  |  0.56    Ca    6.8[L]      08 Jan 2025 06:20  Mg     1.9     01-08    TPro  5.4[L]  /  Alb  2.5[L]  /  TBili  1.9  /  DBili  1.3[H]  /  AST  24  /  ALT  25  /  AlkPhos  240[H]  01-08          ( 01-07-25 @ 06:15 )    139  |  106  |  17.5  ----------------------------<  394[H]  3.3[L]   |  20.0[L]  |  0.61    Liver Functions: ( 01-07-25 @ 06:15 )  Alb: 2.6 g/dL / Pro: 5.5 g/dL / ALK PHOS: 228 U/L / ALT: 22 U/L / AST: 16 U/L / GGT: x

## 2025-01-20 LAB
ALBUMIN SERPL ELPH-MCNC: 2.3 G/DL — LOW (ref 3.3–5.2)
ALP SERPL-CCNC: 165 U/L — HIGH (ref 40–120)
ALT FLD-CCNC: 13 U/L — SIGNIFICANT CHANGE UP
ANION GAP SERPL CALC-SCNC: 10 MMOL/L — SIGNIFICANT CHANGE UP (ref 5–17)
ANISOCYTOSIS BLD QL: SLIGHT — SIGNIFICANT CHANGE UP
AST SERPL-CCNC: 19 U/L — SIGNIFICANT CHANGE UP
BASOPHILS # BLD AUTO: 0 K/UL — SIGNIFICANT CHANGE UP (ref 0–0.2)
BASOPHILS NFR BLD AUTO: 0 % — SIGNIFICANT CHANGE UP (ref 0–2)
BILIRUB SERPL-MCNC: 0.6 MG/DL — SIGNIFICANT CHANGE UP (ref 0.4–2)
BLD GP AB SCN SERPL QL: SIGNIFICANT CHANGE UP
BUN SERPL-MCNC: 4.9 MG/DL — LOW (ref 8–20)
CALCIUM SERPL-MCNC: 8 MG/DL — LOW (ref 8.4–10.5)
CHLORIDE SERPL-SCNC: 103 MMOL/L — SIGNIFICANT CHANGE UP (ref 96–108)
CO2 SERPL-SCNC: 25 MMOL/L — SIGNIFICANT CHANGE UP (ref 22–29)
CREAT SERPL-MCNC: 0.51 MG/DL — SIGNIFICANT CHANGE UP (ref 0.5–1.3)
DACRYOCYTES BLD QL SMEAR: SLIGHT — SIGNIFICANT CHANGE UP
EGFR: 122 ML/MIN/1.73M2 — SIGNIFICANT CHANGE UP
ELLIPTOCYTES BLD QL SMEAR: SLIGHT — SIGNIFICANT CHANGE UP
EOSINOPHIL # BLD AUTO: 0 K/UL — SIGNIFICANT CHANGE UP (ref 0–0.5)
EOSINOPHIL NFR BLD AUTO: 0 % — SIGNIFICANT CHANGE UP (ref 0–6)
GIANT PLATELETS BLD QL SMEAR: PRESENT — SIGNIFICANT CHANGE UP
GLUCOSE BLDC GLUCOMTR-MCNC: 145 MG/DL — HIGH (ref 70–99)
GLUCOSE BLDC GLUCOMTR-MCNC: 161 MG/DL — HIGH (ref 70–99)
GLUCOSE BLDC GLUCOMTR-MCNC: 186 MG/DL — HIGH (ref 70–99)
GLUCOSE BLDC GLUCOMTR-MCNC: 83 MG/DL — SIGNIFICANT CHANGE UP (ref 70–99)
GLUCOSE SERPL-MCNC: 167 MG/DL — HIGH (ref 70–99)
HCT VFR BLD CALC: 27.1 % — LOW (ref 34.5–45)
HCT VFR BLD CALC: 29.1 % — LOW (ref 34.5–45)
HGB BLD-MCNC: 8.1 G/DL — LOW (ref 11.5–15.5)
HGB BLD-MCNC: 8.7 G/DL — LOW (ref 11.5–15.5)
LYMPHOCYTES # BLD AUTO: 1.11 K/UL — SIGNIFICANT CHANGE UP (ref 1–3.3)
LYMPHOCYTES # BLD AUTO: 12.2 % — LOW (ref 13–44)
MACROCYTES BLD QL: SLIGHT — SIGNIFICANT CHANGE UP
MANUAL SMEAR VERIFICATION: SIGNIFICANT CHANGE UP
MCHC RBC-ENTMCNC: 28.5 PG — SIGNIFICANT CHANGE UP (ref 27–34)
MCHC RBC-ENTMCNC: 28.8 PG — SIGNIFICANT CHANGE UP (ref 27–34)
MCHC RBC-ENTMCNC: 29.9 G/DL — LOW (ref 32–36)
MCHC RBC-ENTMCNC: 29.9 G/DL — LOW (ref 32–36)
MCV RBC AUTO: 95.4 FL — SIGNIFICANT CHANGE UP (ref 80–100)
MCV RBC AUTO: 96.4 FL — SIGNIFICANT CHANGE UP (ref 80–100)
METAMYELOCYTES # FLD: 0.9 % — HIGH (ref 0–0)
METAMYELOCYTES NFR BLD: 0.9 % — HIGH (ref 0–0)
MONOCYTES # BLD AUTO: 0.47 K/UL — SIGNIFICANT CHANGE UP (ref 0–0.9)
MONOCYTES NFR BLD AUTO: 5.2 % — SIGNIFICANT CHANGE UP (ref 2–14)
NEUTROPHILS # BLD AUTO: 7.27 K/UL — SIGNIFICANT CHANGE UP (ref 1.8–7.4)
NEUTROPHILS NFR BLD AUTO: 78.3 % — HIGH (ref 43–77)
NEUTS BAND # BLD: 1.7 % — SIGNIFICANT CHANGE UP (ref 0–8)
NEUTS BAND NFR BLD: 1.7 % — SIGNIFICANT CHANGE UP (ref 0–8)
NRBC # BLD: 4 /100 WBCS — HIGH (ref 0–0)
NRBC BLD-RTO: 4 /100 WBCS — HIGH (ref 0–0)
OVALOCYTES BLD QL SMEAR: SLIGHT — SIGNIFICANT CHANGE UP
PLAT MORPH BLD: NORMAL — SIGNIFICANT CHANGE UP
PLATELET # BLD AUTO: 158 K/UL — SIGNIFICANT CHANGE UP (ref 150–400)
PLATELET # BLD AUTO: 202 K/UL — SIGNIFICANT CHANGE UP (ref 150–400)
POIKILOCYTOSIS BLD QL AUTO: SLIGHT — SIGNIFICANT CHANGE UP
POLYCHROMASIA BLD QL SMEAR: SLIGHT — SIGNIFICANT CHANGE UP
POTASSIUM SERPL-MCNC: 3.9 MMOL/L — SIGNIFICANT CHANGE UP (ref 3.5–5.3)
POTASSIUM SERPL-SCNC: 3.9 MMOL/L — SIGNIFICANT CHANGE UP (ref 3.5–5.3)
PROT SERPL-MCNC: 5.5 G/DL — LOW (ref 6.6–8.7)
RBC # BLD: 2.81 M/UL — LOW (ref 3.8–5.2)
RBC # BLD: 3.05 M/UL — LOW (ref 3.8–5.2)
RBC # FLD: 25.2 % — HIGH (ref 10.3–14.5)
RBC # FLD: 25.2 % — HIGH (ref 10.3–14.5)
RBC BLD AUTO: ABNORMAL
SODIUM SERPL-SCNC: 138 MMOL/L — SIGNIFICANT CHANGE UP (ref 135–145)
VARIANT LYMPHS # BLD: 1.7 % — SIGNIFICANT CHANGE UP (ref 0–6)
VARIANT LYMPHS NFR BLD MANUAL: 1.7 % — SIGNIFICANT CHANGE UP (ref 0–6)
WBC # BLD: 9.09 K/UL — SIGNIFICANT CHANGE UP (ref 3.8–10.5)
WBC # BLD: 9.59 K/UL — SIGNIFICANT CHANGE UP (ref 3.8–10.5)
WBC # FLD AUTO: 9.09 K/UL — SIGNIFICANT CHANGE UP (ref 3.8–10.5)
WBC # FLD AUTO: 9.59 K/UL — SIGNIFICANT CHANGE UP (ref 3.8–10.5)

## 2025-01-20 PROCEDURE — 99232 SBSQ HOSP IP/OBS MODERATE 35: CPT

## 2025-01-20 PROCEDURE — G0545: CPT

## 2025-01-20 PROCEDURE — 99233 SBSQ HOSP IP/OBS HIGH 50: CPT

## 2025-01-20 RX ORDER — BACTERIOSTATIC SODIUM CHLORIDE 0.9 %
500 VIAL (ML) INJECTION ONCE
Refills: 0 | Status: COMPLETED | OUTPATIENT
Start: 2025-01-20 | End: 2025-01-20

## 2025-01-20 RX ORDER — BACTERIOSTATIC SODIUM CHLORIDE 0.9 %
250 VIAL (ML) INJECTION ONCE
Refills: 0 | Status: COMPLETED | OUTPATIENT
Start: 2025-01-20 | End: 2025-01-20

## 2025-01-20 RX ADMIN — PREGABALIN CAPSULES, CV 200 MILLIGRAM(S): 225 CAPSULE ORAL at 14:24

## 2025-01-20 RX ADMIN — Medication 1 APPLICATION(S): at 05:24

## 2025-01-20 RX ADMIN — HYDROMORPHONE HYDROCHLORIDE 30 MILLILITER(S): 4 INJECTION, SOLUTION INTRAMUSCULAR; INTRAVENOUS; SUBCUTANEOUS at 07:15

## 2025-01-20 RX ADMIN — NAFCILLIN INJECTION 200 GRAM(S): 2 POWDER, FOR SOLUTION INTRAMUSCULAR; INTRAMUSCULAR; INTRAVENOUS at 21:55

## 2025-01-20 RX ADMIN — METHADONE HYDROCHLORIDE 5 MILLIGRAM(S): 5 SOLUTION ORAL at 14:24

## 2025-01-20 RX ADMIN — HYDROMORPHONE HYDROCHLORIDE 30 MILLILITER(S): 4 INJECTION, SOLUTION INTRAMUSCULAR; INTRAVENOUS; SUBCUTANEOUS at 19:20

## 2025-01-20 RX ADMIN — PREDNISONE 20 MILLIGRAM(S): 5 TABLET ORAL at 05:13

## 2025-01-20 RX ADMIN — NAFCILLIN INJECTION 200 GRAM(S): 2 POWDER, FOR SOLUTION INTRAMUSCULAR; INTRAMUSCULAR; INTRAVENOUS at 05:13

## 2025-01-20 RX ADMIN — DEXTROMETHORPHAN HBR AND GUAIFENESIN ORAL SOLUTION 10 MILLILITER(S): 10; 100 LIQUID ORAL at 05:26

## 2025-01-20 RX ADMIN — ANTISEPTIC SURGICAL SCRUB 1 APPLICATION(S): 0.04 SOLUTION TOPICAL at 12:41

## 2025-01-20 RX ADMIN — Medication 100 MILLIGRAM(S): at 05:25

## 2025-01-20 RX ADMIN — HYDROMORPHONE HYDROCHLORIDE 30 MILLILITER(S): 4 INJECTION, SOLUTION INTRAMUSCULAR; INTRAVENOUS; SUBCUTANEOUS at 08:17

## 2025-01-20 RX ADMIN — Medication 100 MILLIGRAM(S): at 22:47

## 2025-01-20 RX ADMIN — Medication 1.25 MILLIGRAM(S): at 20:40

## 2025-01-20 RX ADMIN — Medication 500 MILLILITER(S): at 02:31

## 2025-01-20 RX ADMIN — DEXTROMETHORPHAN HBR AND GUAIFENESIN ORAL SOLUTION 10 MILLILITER(S): 10; 100 LIQUID ORAL at 01:25

## 2025-01-20 RX ADMIN — AMINOCAPROIC ACID 4 GRAM(S): 1000 TABLET ORAL at 10:49

## 2025-01-20 RX ADMIN — Medication 1.25 MILLIGRAM(S): at 05:10

## 2025-01-20 RX ADMIN — NAFCILLIN INJECTION 200 GRAM(S): 2 POWDER, FOR SOLUTION INTRAMUSCULAR; INTRAMUSCULAR; INTRAVENOUS at 10:48

## 2025-01-20 RX ADMIN — DEXTROMETHORPHAN HBR AND GUAIFENESIN ORAL SOLUTION 10 MILLILITER(S): 10; 100 LIQUID ORAL at 22:13

## 2025-01-20 RX ADMIN — OCTREOTIDE ACETATE 100 MICROGRAM(S): 1000 INJECTION INTRAVENOUS; SUBCUTANEOUS at 05:23

## 2025-01-20 RX ADMIN — DEXTROMETHORPHAN HBR AND GUAIFENESIN ORAL SOLUTION 10 MILLILITER(S): 10; 100 LIQUID ORAL at 18:03

## 2025-01-20 RX ADMIN — DEXTROMETHORPHAN HBR AND GUAIFENESIN ORAL SOLUTION 10 MILLILITER(S): 10; 100 LIQUID ORAL at 14:25

## 2025-01-20 RX ADMIN — Medication 4 MILLILITER(S): at 08:19

## 2025-01-20 RX ADMIN — PREGABALIN CAPSULES, CV 200 MILLIGRAM(S): 225 CAPSULE ORAL at 05:12

## 2025-01-20 RX ADMIN — ACETAMINOPHEN, DIPHENHYDRAMINE HCL, PHENYLEPHRINE HCL 3 MILLIGRAM(S): 325; 25; 5 TABLET ORAL at 22:13

## 2025-01-20 RX ADMIN — Medication 4 MILLILITER(S): at 20:40

## 2025-01-20 RX ADMIN — Medication 500 MILLILITER(S): at 00:45

## 2025-01-20 RX ADMIN — OCTREOTIDE ACETATE 100 MICROGRAM(S): 1000 INJECTION INTRAVENOUS; SUBCUTANEOUS at 18:12

## 2025-01-20 RX ADMIN — Medication 1: at 09:32

## 2025-01-20 RX ADMIN — AMINOCAPROIC ACID 4 GRAM(S): 1000 TABLET ORAL at 21:55

## 2025-01-20 RX ADMIN — DEXTROSE MONOHYDRATE, SODIUM CHLORIDE, AND POTASSIUM CHLORIDE 100 MILLILITER(S): 50; 2.25; 2.24 INJECTION, SOLUTION INTRAVENOUS at 12:24

## 2025-01-20 RX ADMIN — Medication 1.25 MILLIGRAM(S): at 14:33

## 2025-01-20 RX ADMIN — PANTOPRAZOLE 40 MILLIGRAM(S): 20 TABLET, DELAYED RELEASE ORAL at 05:17

## 2025-01-20 RX ADMIN — AMINOCAPROIC ACID 4 GRAM(S): 1000 TABLET ORAL at 18:05

## 2025-01-20 RX ADMIN — Medication 1 APPLICATION(S): at 12:24

## 2025-01-20 RX ADMIN — Medication 1.25 MILLIGRAM(S): at 08:19

## 2025-01-20 RX ADMIN — Medication 1 APPLICATION(S): at 18:06

## 2025-01-20 RX ADMIN — NAFCILLIN INJECTION 200 GRAM(S): 2 POWDER, FOR SOLUTION INTRAMUSCULAR; INTRAMUSCULAR; INTRAVENOUS at 14:25

## 2025-01-20 RX ADMIN — NAFCILLIN INJECTION 200 GRAM(S): 2 POWDER, FOR SOLUTION INTRAMUSCULAR; INTRAMUSCULAR; INTRAVENOUS at 01:57

## 2025-01-20 RX ADMIN — PREGABALIN CAPSULES, CV 200 MILLIGRAM(S): 225 CAPSULE ORAL at 21:54

## 2025-01-20 RX ADMIN — PANTOPRAZOLE 40 MILLIGRAM(S): 20 TABLET, DELAYED RELEASE ORAL at 18:05

## 2025-01-20 RX ADMIN — Medication 100 MILLIGRAM(S): at 14:24

## 2025-01-20 RX ADMIN — HYDROMORPHONE HYDROCHLORIDE 30 MILLILITER(S): 4 INJECTION, SOLUTION INTRAMUSCULAR; INTRAVENOUS; SUBCUTANEOUS at 13:36

## 2025-01-20 RX ADMIN — ALBENDAZOLE 400 MILLIGRAM(S): 200 TABLET, FILM COATED ORAL at 12:24

## 2025-01-20 RX ADMIN — METHADONE HYDROCHLORIDE 5 MILLIGRAM(S): 5 SOLUTION ORAL at 05:17

## 2025-01-20 RX ADMIN — AMINOCAPROIC ACID 4 GRAM(S): 1000 TABLET ORAL at 05:08

## 2025-01-20 RX ADMIN — Medication 4 MILLILITER(S): at 14:33

## 2025-01-20 RX ADMIN — Medication 4 MILLILITER(S): at 05:07

## 2025-01-20 RX ADMIN — Medication 1: at 12:41

## 2025-01-20 RX ADMIN — NAFCILLIN INJECTION 200 GRAM(S): 2 POWDER, FOR SOLUTION INTRAMUSCULAR; INTRAMUSCULAR; INTRAVENOUS at 18:06

## 2025-01-20 NOTE — PROGRESS NOTE ADULT - ASSESSMENT
39y  Female with h/o metastatic breast cancer ER/ID Neg, HER-2 + on chemotherapy (last dose 12/26/24) (mets to lung, liver, spleen, spine, bone and brain), gastritis w/ intermittent episodes of dark stool (follows w/ Dr. Rosado GI and is on PPI and octreotide daily). Patient presented 1/5 with c/o worsening SOB.  She was seen in ED 1/2/25 for for weakness and SOB at which time she was found to have Hb of 5 requiring PRBC transfusion and positive for Flu A and started on tamiflu and was discharged from the ED on 1/3/25.  Her respiratory status has worsened since then w/ productive cough associated with body aches and chills. Admitted for sepsis ,acute hypoxic respiratory failure with flu A. s/p HFNC for worsening respiratory status. S/P course of Tamiflu. Vancomycin and Zosyn was added for possible superimposed bacterial pna. Blood cultures with MSSA.  Cardiology consultation requested for evaluation of endocarditis. Blood cultures 1/5, 1/7, 1/9 reporting MSSA , Repeat blood cultures ngtd on 1/11/25, Sputum Cx 1/6 Staphylococcus aureus. MARYANN hold for active gib requring transfusion. Pt had large BM likley diverticular bleed. Had recent scopy. Gi following. ct abd/pelvis w/iv contrast no active bleed (1/13). Urine Cx 1/5 reporting 10k - 49k Escherichia coli  of ? significance  since UA not concerning for UTI.  RVP/COVID 19 PCR + Influenza A.GI PCR  positive norovirus,Giardia.        #Nororvirus,Giadia gastroenteritis  -GI PCR  positive norovirus,Giadia  -Albendazole per ID (dc on 01/22 -- 5 day course)  -ID on board, recs noted  -fluids as needed, encourage po hydration  -bm less bloody, improving    #Acute blood loss anemia   #GIB  -s/p multiple transfusions during this hospital stay  -goal hgb of 7   -CTA abd/pelvis  no active bleed   -PPI/OCTRIO  -GI eval with no plan for scopes 2/2 recently done.  Still with bleeding.  reed GI history, ? GAVE disease per GI notes Has had multiple EGD/colonoscopies in past as well.   -hem/onc following  -hem/onc started Amicar on1/16  -bm with less blood       #Thrombocytopenia  -likely from chemo/sepsis  -will monitor cbc        #Sepsis 2/2 Flu A w/ superimposed multifocal PNA  #Acute hypoxic respiratory failure 2/2 Flu A w/ superimposed multifocal PNA   - on NC, comfortable  - persistently positive RVP for flu A  - s/p  tamiflu   - repeat CT 1/8 shows improved pleural effusion but worsening GGOs, unclear if infectious or malignant etiology  - repeat CT 1/9 with similar findings  - repeat CT next week to evaluate for improvement of infiltrates  - po steroid tapering dose  - c/w abx per ID recs  - sputum clx growing moderate staph aureus   - TTE EF 65-70%, no WMA    #MSSA bacteremia  - blood clx positive 1/5, repeat bl c/s (1/11) ngtd  - ID following  - c/w nafcillin  per id (will discuss total course length with ID)  - MARYANN on hold for gib. f/u cardio rec  -f/u ID REC        #Chronic normocytic anemia 2/2 metastatic breast cancer on chemo and 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   #Thrombocytopenia likely 2/2 sepsis   #Breast cancer ER/ID Neg, HER-2 pos on chemotherapy w/ mets to lung, liver, spleen, spine, bone and brain  - Baseline Hb ranges 8-9    - s/p prbc transfusion on 1/2, 1/8 and 1/9,1/11,1/13,1/14  - trend CBC, transfuse for hgb <7  - c/w home sub q octreotide and IV protonix  - Monitor CBC and transfuse for Hb<7 and plts<20K in setting of sepsis   - [facility] following  - monitor on tele and     #Breast cancer ER/ID Neg, HER-2 pos on chemotherapy w/ mets to lung, liver, spleen, spine, bone and brain  - c/w pregabalin, methylphenidate (confirmed on ISTOP Reference #: [id number])  - Reports being on methadone for pain but last 30 day script on istop dispensed 09/19/24  -on dilaudid pca pump -- demand dosing to 0.5mg (lockout 8mins) with 0.1mg qhourly running  - pain management following, pt may benefit from ms contin as outpatient (states that she is unable to tolerate fent patch)    dvt ppx scd  disposition: still medically active, anticipate 3-4 day LOS

## 2025-01-20 NOTE — CHART NOTE - NSCHARTNOTEFT_GEN_A_CORE
PA NOTE-MEDICINE    Called by RN due to Pt c/o Increased pain and Edema to B/L LE x now.  Pt with h/o Metastatic Br Ca with + GI Bleed-not on any A/C 2/2 GI Bleed    Currently on SCD Boots B/L     T(C): 36.8 (20 Jan 2025 19:17), Max: 37 (20 Jan 2025 04:02)  T(F): 98.2 (20 Jan 2025 19:17), Max: 98.6 (20 Jan 2025 04:02)  HR: 91 (20 Jan 2025 22:00) (80 - 104)  BP: 111/63 (20 Jan 2025 22:00) (84/42 - 125/58)  BP(mean): 79 (20 Jan 2025 22:00) (53 - 79)  RR: 16 (20 Jan 2025 22:00) (16 - 21)  SpO2: 100% (20 Jan 2025 22:00) (96% - 100%) 2 L nc     General: WDWN F lying in Bed NAD   Cardiac: S1S2 RRR  Lungs: + Sl Occasional Rhonchi Apicies otherwise Clear B/L  Abd: + Sl Protuberant  No Pain on Palp + BD x 4 Q  Integument: No Pallor Warm/Dry   Ext: + 2 Pitting Edema (Baseline) No Cord Palp. + 2 DP Pulses + Sensory B/L     A/P Eval Pt c/p oncreased pain/edema to B/L L/E Calves  Venous Dopplers B/L LE Stat  Continue to monitor Pt  Recall PA for any changes in Pt Status   Will follow Doppler results   Will sign out to AM Team to follow PA NOTE-MEDICINE    Called by RN due to Pt c/o Increased pain and Edema to B/L LE x now.  Pt with h/o Metastatic Br Ca with + GI Bleed-not on any A/C 2/2 GI Bleed    Currently on SCD Boots B/L     T(C): 36.8 (20 Jan 2025 19:17), Max: 37 (20 Jan 2025 04:02)  T(F): 98.2 (20 Jan 2025 19:17), Max: 98.6 (20 Jan 2025 04:02)  HR: 91 (20 Jan 2025 22:00) (80 - 104)  BP: 111/63 (20 Jan 2025 22:00) (84/42 - 125/58)  BP(mean): 79 (20 Jan 2025 22:00) (53 - 79)  RR: 16 (20 Jan 2025 22:00) (16 - 21)  SpO2: 100% (20 Jan 2025 22:00) (96% - 100%) 2 L nc     General: WDWN F lying in Bed NAD   Cardiac: S1S2 RRR  Lungs: + Sl Occasional Rhonchi Apicies otherwise Clear B/L  Abd: + Sl Protuberant  No Pain on Palp + BD x 4 Q  Integument: No Pallor Warm/Dry   Ext: + 2 Pitting Edema (Baseline) No Cord Palp. + 2 DP Pulses + Sensory B/L     A/P Eval Pt c/p oncreased pain/edema to B/L L/E Calves  Venous Dopplers B/L LE Stat  Continue to monitor Pt  Recall PA for any changes in Pt Status   Will follow Doppler results   Will sign out to AM Team to follow    ADDENDUM:   B/L LE Venous Dopplers NEG

## 2025-01-20 NOTE — PROGRESS NOTE ADULT - SUBJECTIVE AND OBJECTIVE BOX
[facility] Division of Hospital Medicine    SUBJECTIVE / OVERNIGHT EVENTS:    pt seen and examined at bedside  pain better w increased dosing of dilaudid pca pump  bm with much less blood now  no abd pain, nausea, vomiting    MEDICATIONS  (STANDING):  acetylcysteine 10%  Inhalation 4 milliLiter(s) Inhalation every 6 hours  aminocaproic acid Tablet 4 Gram(s) Oral every 6 hours  chlorhexidine 2% Cloths 1 Application(s) Topical daily  dextrose 5%. 1000 milliLiter(s) (50 mL/Hr) IV Continuous <Continuous>  dextrose 5%. 1000 milliLiter(s) (100 mL/Hr) IV Continuous <Continuous>  dextrose 50% Injectable 25 Gram(s) IV Push once  dextrose 50% Injectable 12.5 Gram(s) IV Push once  dextrose 50% Injectable 25 Gram(s) IV Push once  glucagon  Injectable 1 milliGRAM(s) IntraMuscular once  hydrocortisone 1% Cream 1 Application(s) Topical daily  hydrocortisone 2.5% Rectal Cream 1 Application(s) Rectal two times a day  HYDROmorphone PCA (1 mG/mL) 30 milliLiter(s) PCA Continuous PCA Continuous  insulin lispro (ADMELOG) corrective regimen sliding scale   SubCutaneous three times a day before meals  levalbuterol Inhalation 1.25 milliGRAM(s) Inhalation every 6 hours  methadone    Tablet 5 milliGRAM(s) Oral <User Schedule>  nafcillin  IVPB 2 Gram(s) IV Intermittent every 4 hours  octreotide  Injectable 100 MICROGram(s) SubCutaneous two times a day  pantoprazole  Injectable 40 milliGRAM(s) IV Push every 12 hours  predniSONE   Tablet 20 milliGRAM(s) Oral daily  pregabalin 200 milliGRAM(s) Oral every 8 hours  sodium chloride 0.9% with potassium chloride 20 mEq/L 1000 milliLiter(s) (100 mL/Hr) IV Continuous <Continuous>    MEDICATIONS  (PRN):  acetaminophen     Tablet .. 650 milliGRAM(s) Oral every 6 hours PRN Temp greater or equal to 38C (100.4F), Mild Pain (1 - 3)  aluminum hydroxide/magnesium hydroxide/simethicone Suspension 30 milliLiter(s) Oral every 4 hours PRN Dyspepsia  benzonatate 100 milliGRAM(s) Oral every 8 hours PRN Cough  dextrose Oral Gel 15 Gram(s) Oral once PRN Blood Glucose LESS THAN 70 milliGRAM(s)/deciliter  guaifenesin/dextromethorphan Oral Liquid 10 milliLiter(s) Oral every 4 hours PRN Cough  HYDROmorphone  Injectable 0.5 milliGRAM(s) IV Push every 6 hours PRN for severe break through pain scale 7-10  melatonin 3 milliGRAM(s) Oral at bedtime PRN Insomnia  naloxone Injectable 0.1 milliGRAM(s) IV Push every 3 minutes PRN For ANY of the following changes in patient status:  A. RR LESS THAN 10 breaths per minute, B. Oxygen saturation LESS THAN 90%, C. Sedation score of 6  ondansetron Injectable 4 milliGRAM(s) IV Push every 8 hours PRN Nausea and/or Vomiting        I&O's Summary    19 Jan 2025 07:01  -  20 Jan 2025 07:00  --------------------------------------------------------  IN: 3550 mL / OUT: 1 mL / NET: 3549 mL    20 Jan 2025 07:01  -  20 Jan 2025 13:50  --------------------------------------------------------  IN: 600 mL / OUT: 0 mL / NET: 600 mL        PHYSICAL EXAM:  Vital Signs Last 24 Hrs  T(C): 36.8 (20 Jan 2025 07:55), Max: 37 (20 Jan 2025 04:02)  T(F): 98.2 (20 Jan 2025 07:55), Max: 98.6 (20 Jan 2025 04:02)  HR: 92 (20 Jan 2025 12:00) (80 - 99)  BP: 102/52 (20 Jan 2025 12:00) (84/42 - 110/65)  BP(mean): 69 (20 Jan 2025 12:00) (55 - 79)  RR: 20 (20 Jan 2025 12:00) (18 - 20)  SpO2: 97% (20 Jan 2025 12:00) (97% - 100%)    Parameters below as of 20 Jan 2025 12:00  Patient On (Oxygen Delivery Method): nasal cannula  O2 Flow (L/min): 2          CONSTITUTIONAL: NAD, well-groomed  ENMT: Moist oral mucosa, no pharyngeal injection or exudates; normal dentition  RESPIRATORY: Normal respiratory effort; lungs are clear to auscultation bilaterally  CARDIOVASCULAR: Regular rate and rhythm, normal S1 and S2, no murmur/rub/gallop; No lower extremity edema; Peripheral pulses are 2+ bilaterally  ABDOMEN: Nontender to palpation, normoactive bowel sounds, no rebound/guarding; No hepatosplenomegaly  MUSCLOSKELETAL:  Normal gait; no clubbing or cyanosis of digits; no joint swelling or tenderness to palpation  PSYCH: A+O to person, place, and time; affect appropriate  NEUROLOGY: CN 2-12 are intact and symmetric; no gross sensory deficits;   SKIN: No rashes; no palpable lesions    LABS:                        8.1    9.59  )-----------( 158      ( 20 Jan 2025 07:25 )             27.1     01-20    138  |  103  |  4.9[L]  ----------------------------<  167[H]  3.9   |  25.0  |  0.51    Ca    8.0[L]      20 Jan 2025 07:25    TPro  5.5[L]  /  Alb  2.3[L]  /  TBili  0.6  /  DBili  x   /  AST  19  /  ALT  13  /  AlkPhos  165[H]  01-20          Urinalysis Basic - ( 20 Jan 2025 07:25 )    Color: x / Appearance: x / SG: x / pH: x  Gluc: 167 mg/dL / Ketone: x  / Bili: x / Urobili: x   Blood: x / Protein: x / Nitrite: x   Leuk Esterase: x / RBC: x / WBC x   Sq Epi: x / Non Sq Epi: x / Bacteria: x        CAPILLARY BLOOD GLUCOSE      POCT Blood Glucose.: 161 mg/dL (20 Jan 2025 12:22)  POCT Blood Glucose.: 186 mg/dL (20 Jan 2025 09:29)  POCT Blood Glucose.: 108 mg/dL (19 Jan 2025 21:07)  POCT Blood Glucose.: 95 mg/dL (19 Jan 2025 16:47)

## 2025-01-20 NOTE — PROGRESS NOTE ADULT - ASSESSMENT
39y  Female with h/o metastatic breast cancer ER/[place] Neg, HER-2 + on chemotherapy (last dose 12/26/24) (mets to lung, liver, spleen, spine, bone and brain), gastritis w/ intermittent episodes of dark stool (follows w/ Dr. Rosado GI and is on PPI and octreotide daily). Patient presented 1/5 with c/o worsening SOB.  She was seen in ED 1/2/25 for for weakness and SOB at which time she was found to have Hb of 5 requiring PRBC transfusion and positive for Flu A and started on tamiflu and was discharged from the ED on 1/3/25.  Her respiratory status has worsened since then w/ productive cough associated with body aches and chills.  Denies sick contacts but has had many frequent hospital visits. Patient has been afebrile, no leukocytosis. Was placed on BIPAP for Worsening respiratory status. continued on Tamiflu. Vancomycn and Zosyn was added. Blood cultures with MSSA.     Metastatic breast cancer   - last dose of trastuzumab was on 12/26/24.  -All treatment on hold at the moment.  -Follow up with primary oncologist, Dr. Chapa after discharge    Cytopenias   - secondary to malignancy and acute illness and now GIB  - S/P multiple transfusions  - GI evaluated for large bloody bowel movement  Suspect rectal bleeding is due to diverticular bleed which are self-limiting - If patient becomes hemodynamically unstable, requiring multiple transfusions,   - CT A/P No evidence of active intraluminal extravasation of contrast.   - GI f/u noted.  no plans for scope.  Previously scoped in Nov. 2024. internal hemorrhoids, gastric erosions, no active bleeding site   - c/w home sub q octreotide and IV protonix  - Continue Amicar 4 grams Q 6 H for bleeding- will titrate according to HGB   - Has not been transfused since 1/17  -Transfuse if hgb<7.0.    - Pt is requesting GI come back to re evaluate   - Hgb stable at 8.1      Respiratory failure   - multifocal pneumonia and flu+  - S/P oseltamivir    - Management as per primary team.  - TTE 1/6 with no veg  - Cardiology following- . No MARYANN at this time until stabilizes and source of bleed ascertained    MSSA Bacteremia  - cultures persistently positive. pt afebrile.   --  ID following -On nafcillin   -- S/P Port removal 1/10/25-  PICC or midline prior to d/c for outpt chemo     + Noro virus and giardia lamblia  - ID following For Giardia, confirm the Giardia stool Ag  - Continue Albendazole 400mg PO q 24hours x 5 days in the meantime for Giardia     Cont aggressive pain management. Remains on PCA pump.    Will follow

## 2025-01-20 NOTE — CHART NOTE - NSCHARTNOTEFT_GEN_A_CORE
Called by RN for BP 90/50    Subjective & Objective: Patient is awake and alert at baseline mentation in no acute distress. Patient has no active complaints at this time. Denies syncope, dizziness, weakness, headaches, vision changes, chest pain, palpitations, shortness of breath, abdominal pain, nausea, or vomiting.      Vital Signs  T(F): 97.4   HR: 99  BP: 90/50  RR: 19  SpO2: 99%)    Parameters below as of 20 Jan 2025 02:00  Patient On (Oxygen Delivery Method): nasal cannula  O2 Flow (L/min): 2      Assessment & Plan:   This is a 40yo  Female with h/o metastatic breast cancer on chemotherapy (last dose 12/26/24) (mets to lung, liver, spleen, spine, bone and brain), gastritis w/ intermittent episodes of dark stool (follows w/ Dr. Rosado GI and is on PPI and octreotide daily). admitted for Noro virus, acute blood loss anemia 2/2 GIB, sepsis 2/2 flu A/ PNA, thrombocytopenia, MSSA bacteremia, now with BP of 90/50, pt is asymptomatic, other vss.      1) Hypotension   - Likely 2/2 anemia VS dehydration: check CBC  - Will give IV  cc bolus and recheck BP   - Will continue to follow, RN to call with any changes    ** Addendum: Post bolus BP 84/42, other vitals stable. Pt asymptomatic. Hgb 8.7. No signs of bleeding. Will give additional 500mL bolus and reassess BP after. Called by RN for BP 90/50    Subjective & Objective: Patient is awake and alert at baseline mentation in no acute distress. Patient has no active complaints at this time. Denies syncope, dizziness, weakness, headaches, vision changes, chest pain, palpitations, shortness of breath, abdominal pain, nausea, or vomiting.      Vital Signs  T(F): 97.4   HR: 99  BP: 90/50  RR: 19  SpO2: 99%)    Parameters below as of 20 Jan 2025 02:00  Patient On (Oxygen Delivery Method): nasal cannula  O2 Flow (L/min): 2      Assessment & Plan:   This is a 40yo  Female with h/o metastatic breast cancer on chemotherapy (last dose 12/26/24) (mets to lung, liver, spleen, spine, bone and brain), gastritis w/ intermittent episodes of dark stool (follows w/ Dr. Rosado GI and is on PPI and octreotide daily). admitted for Noro virus, acute blood loss anemia 2/2 GIB, sepsis 2/2 flu A/ PNA, thrombocytopenia, MSSA bacteremia, now with BP of 90/50, pt is asymptomatic, other vss.      1) Hypotension   - Likely 2/2 anemia VS dehydration: check CBC  - Will give IV  cc bolus and recheck BP   - Will continue to follow, RN to call with any changes    ** Addendum: Post bolus BP 84/42, other vitals stable. Pt asymptomatic. Hgb 8.7. No signs of bleeding. Will give additional 500mL bolus and reassess BP after. Repeat BP 91/52.

## 2025-01-20 NOTE — PROGRESS NOTE ADULT - ASSESSMENT
39y  Female with h/o metastatic breast cancer ER/[place] Neg, HER-2 + on chemotherapy (last dose 12/26/24) (mets to lung, liver, spleen, spine, bone and brain), gastritis w/ intermittent episodes of dark stool (follows w/ Dr. Rosado GI and is on PPI and octreotide daily). Patient presented 1/5 with c/o worsening SOB.  She was seen in ED 1/2/25 for for weakness and SOB at which time she was found to have Hb of 5 requiring PRBC transfusion and positive for Flu A and started on tamiflu and was discharged from the ED on 1/3/25.  Her respiratory status has worsened since then w/ productive cough associated with body aches and chills.  Denies sick contacts but has had many frequent hospital visits. Patient has been afebrile, no leukocytosis. Was placed on BIPAP for Worsening respiratory status. continued on Tamiflu. Vancomycn and Zosyn was added. Blood cultures with MSSA. ID input requested.       MSSA bacteremia   Staphylococcus aureus PNA   Influenza A   metastatic breast cancer ER/[place] Neg, HER-2 + on chemotherapy   Port in place s/p explant 1/10/25  Stool PCR + Norovirus and Giardia       - Blood cultures 1/5, 1/7, 1/9 reporting MSSA    - Repeat blood cultures 1/10 and 1/11/25 no growth   - Sputum Cx 1/6 Staphylococcus aureus  - Urine Cx 1/5 reporting 10k - 49k Escherichia coli  of ? significance since UA not concerning for UTI   - Stool PCR + Norovirus and Giardia   - RVP/COVID 19 PCR + Influenza A  - S/P Port removal 1/10/25  - CTA Chest reporting No acute pulmonary embolism. Wide spread patchy airspace opacities bilaterally, increased since the prior exam.  - US RUQ reporting No evidence of acute cholecystitis or biliary ductal dilatation.  - Procalcitonin level 2.32 --> 1.37, ordered for the AM   - TTE 1/6 with no veg  - Called cardiology consult for MARYANN if possible   - GI eval noted, no plan for EGD   - s/p Port removal 1/10/25  - Completed oseltamivir  1/10/25  - Continue Nafcillin 2gm IV [id number]  - For Norovirus, continue hydration and supportive care  - For Giardia, confirm the Giardia stool Ag  - Continue Albendazole 400mg PO q 24hours x 5 days in the meantime for Giardia   - Hold off on PICC/Midline for now unless needed for reasons other than infectious diseases  - Follow up cultures  - Trend Fever  - Trend WBC      Thank you for allowing me to participate in the care of your patient.   Will Follow    Discussed treatment plan with:  Clinical pharmacy

## 2025-01-20 NOTE — PROGRESS NOTE ADULT - SUBJECTIVE AND OBJECTIVE BOX
39y  Female with h/o metastatic breast cancer ER/MS Neg, HER-2 + on chemotherapy (last dose 12/26/24) (mets to lung, liver, spleen, spine, bone and brain), gastritis w/ intermittent episodes of dark stool (follows w/ Dr. Rosado GI and is on PPI and octreotide daily). Patient presented 1/5 with c/o worsening SOB.  She was seen in ED 1/2/25 for for weakness and SOB at which time she was found to have Hb of 5 requiring PRBC transfusion and positive for Flu A and started on tamiflu and was discharged from the ED on 1/3/25.  Her respiratory status has worsened since then w/ productive cough associated with body aches and chills.  Denies sick contacts but has had many frequent hospital visits. Patient has been afebrile, no leukocytosis. Was placed on BIPAP for Worsening respiratory status. continued on Tamiflu. Vancomycn and Zosyn was added. Blood cultures with MSSA.     PAST MEDICAL & SURGICAL HISTORY:  H/O compression fracture of spine  Anxiety  Metastatic breast cancer  H/O pleural effusion  Pericardial effusion  S/P tonsillectomy  H/O chest tube placement  12/23/21  S/P pericardiocentesis  12/28/21    Allergies  pertuzumab (Other (Severe))  Perjeta (Other (Severe))    MEDICATIONS  (STANDING):  acetylcysteine 10%  Inhalation 4 milliLiter(s) Inhalation every 6 hours  albendazole 400 milliGRAM(s) Oral daily  aminocaproic acid Tablet 4 Gram(s) Oral every 6 hours  chlorhexidine 2% Cloths 1 Application(s) Topical daily  dextrose 5%. 1000 milliLiter(s) (50 mL/Hr) IV Continuous <Continuous>  dextrose 5%. 1000 milliLiter(s) (100 mL/Hr) IV Continuous <Continuous>  dextrose 50% Injectable 25 Gram(s) IV Push once  dextrose 50% Injectable 12.5 Gram(s) IV Push once  dextrose 50% Injectable 25 Gram(s) IV Push once  glucagon  Injectable 1 milliGRAM(s) IntraMuscular once  hydrocortisone 2.5% Rectal Cream 1 Application(s) Rectal two times a day  HYDROmorphone PCA (1 mG/mL) 30 milliLiter(s) PCA Continuous PCA Continuous  insulin lispro (ADMELOG) corrective regimen sliding scale   SubCutaneous three times a day before meals  levalbuterol Inhalation 1.25 milliGRAM(s) Inhalation every 6 hours  methadone    Tablet 5 milliGRAM(s) Oral <User Schedule>  nafcillin  IVPB 2 Gram(s) IV Intermittent every 4 hours  octreotide  Injectable 100 MICROGram(s) SubCutaneous two times a day  pantoprazole  Injectable 40 milliGRAM(s) IV Push every 12 hours  predniSONE   Tablet 20 milliGRAM(s) Oral daily  pregabalin 200 milliGRAM(s) Oral every 8 hours  sodium chloride 0.9% with potassium chloride 20 mEq/L 1000 milliLiter(s) (100 mL/Hr) IV Continuous <Continuous>    MEDICATIONS  (PRN):  acetaminophen     Tablet .. 650 milliGRAM(s) Oral every 6 hours PRN Temp greater or equal to 38C (100.4F), Mild Pain (1 - 3)  aluminum hydroxide/magnesium hydroxide/simethicone Suspension 30 milliLiter(s) Oral every 4 hours PRN Dyspepsia  benzonatate 100 milliGRAM(s) Oral every 8 hours PRN Cough  dextrose Oral Gel 15 Gram(s) Oral once PRN Blood Glucose LESS THAN 70 milliGRAM(s)/deciliter  guaifenesin/dextromethorphan Oral Liquid 10 milliLiter(s) Oral every 4 hours PRN Cough  melatonin 3 milliGRAM(s) Oral at bedtime PRN Insomnia  naloxone Injectable 0.1 milliGRAM(s) IV Push every 3 minutes PRN For ANY of the following changes in patient status:  A. RR LESS THAN 10 breaths per minute, B. Oxygen saturation LESS THAN 90%, C. Sedation score of 6  ondansetron Injectable 4 milliGRAM(s) IV Push every 8 hours PRN Nausea and/or Vomiting      ICU Vital Signs Last 24 Hrs  T(C): 36.8 (20 Jan 2025 07:55), Max: 37 (20 Jan 2025 04:02)  T(F): 98.2 (20 Jan 2025 07:55), Max: 98.6 (20 Jan 2025 04:02)  HR: 87 (20 Jan 2025 08:19) (80 - 99)  BP: 97/63 (20 Jan 2025 08:00) (84/42 - 110/65)  BP(mean): 75 (20 Jan 2025 08:00) (55 - 82)  ABP: --  ABP(mean): --  RR: 20 (20 Jan 2025 08:00) (18 - 20)  SpO2: 98% (20 Jan 2025 08:19) (98% - 100%)    O2 Parameters below as of 20 Jan 2025 08:19  Patient On (Oxygen Delivery Method): nasal cannula w/ humidification, 1      PE:  NAD  On O2 NC  bilateral rales  abd soft, nd, nt  a/o x 3      CBC                          8.1    9.59  )-----------( 158      ( 20 Jan 2025 07:25 )             27.1                             8.3    8.96  )-----------( 156      ( 19 Jan 2025 07:18 )             27.8                           8.6    9.26  )-----------( 136      ( 18 Jan 2025 04:55 )             27.8                             6.4    9.75  )-----------( 130      ( 17 Jan 2025 06:45 )             20.7         Chem:       01-20    138  |  103  |  4.9[L]  ----------------------------<  167[H]  3.9   |  25.0  |  0.51    Ca    8.0[L]      20 Jan 2025 07:25    TPro  5.5[L]  /  Alb  2.3[L]  /  TBili  0.6  /  DBili  x   /  AST  19  /  ALT  13  /  AlkPhos  165[H]  01-20 01-17    139  |  104  |  8.7  ----------------------------<  163[H]  2.9[LL]   |  26.0  |  0.70    Ca    7.3[L]      17 Jan 2025 06:45  Phos  4.2     01-17  Mg     1.5     01-17    TPro  5.0[L]  /  Alb  2.1[L]  /  TBili  0.8  /  DBili  0.3  /  AST  17  /  ALT  13  /  AlkPhos  127[H]  01-16      01-15    138  |  102  |  12.3  ----------------------------<  135[H]  3.6   |  25.0  |  0.67    Ca    8.1[L]      15 Cristhian 2025 06:10      01-14    141  |  103  |  13.4  ----------------------------<  127[H]  3.4[L]   |  30.0[H]  |  0.54    Ca    8.1[L]      14 Jan 2025 04:48        01-13    141  |  103  |  20.3[H]  ----------------------------<  137[H]  3.8   |  27.0  |  0.47[L]    Ca    8.2[L]      13 Jan 2025 06:10

## 2025-01-20 NOTE — PROGRESS NOTE ADULT - SUBJECTIVE AND OBJECTIVE BOX
[facility]  INFECTIOUS DISEASES at [place] / [place] / [place]  =======================================================                              Saalzar Toro MD                              Professor Emeritus:  Dr Warner Mcintosh MD            Diplomates American Board of Internal Medicine & Infectious Diseases                                   Tel  [phone] Fax [phone]                                  Hospital Consult line:  [phone]  =======================================================      [place] [id number]    Follow up: MSSA bacteremia    No fevers   Diarrhea improved   Hypotensive this AM     Allergies:  pertuzumab (Other (Severe))  Perjeta (Other (Severe))        REVIEW OF SYSTEMS:  CONSTITUTIONAL:  No Fever or chills  HEENT:   No diplopia or blurred vision.  No earache, sore throat or runny nose.  CARDIOVASCULAR:  No Chest Pain  RESPIRATORY:  No cough, shortness of breath  GASTROINTESTINAL:  No nausea, vomiting + diarrhea.  GENITOURINARY:  No dysuria, frequency or urgency. No Blood in urine  MUSCULOSKELETAL:  no joint aches, no muscle pain  SKIN:  No change in skin, hair or nails.  NEUROLOGIC:  No Headaches      Physical Exam:  GEN: NAD  HEENT: normocephalic and atraumatic.    NECK: Supple.   LUNGS: CTA B/L.  HEART: RRR  ABDOMEN: Soft, NT, ND.  +BS.    : No CVA tenderness  EXTREMITIES: Without  edema.  MSK: No joint swelling  NEUROLOGIC: No Focal Deficits   SKIN: No rash      Vitals:  T(F): 98.2 (20 Jan 2025 07:55), Max: 98.6 (20 Jan 2025 04:02)  HR: 99 (20 Jan 2025 10:00)  BP: 102/70 (20 Jan 2025 10:00)  RR: 18 (20 Jan 2025 10:00)  SpO2: 99% (20 Jan 2025 10:00) (98% - 100%)  temp max in last 48H T(F): , Max: 98.6 (01-20-25 @ 04:02)    Current Antibiotics:  albendazole 400 milliGRAM(s) Oral daily  nafcillin  IVPB 2 Gram(s) IV Intermittent every 4 hours    Other medications:  acetylcysteine 10%  Inhalation 4 milliLiter(s) Inhalation every 6 hours  aminocaproic acid Tablet 4 Gram(s) Oral every 6 hours  chlorhexidine 2% Cloths 1 Application(s) Topical daily  dextrose 5%. 1000 milliLiter(s) IV Continuous <Continuous>  dextrose 5%. 1000 milliLiter(s) IV Continuous <Continuous>  dextrose 50% Injectable 25 Gram(s) IV Push once  dextrose 50% Injectable 12.5 Gram(s) IV Push once  dextrose 50% Injectable 25 Gram(s) IV Push once  glucagon  Injectable 1 milliGRAM(s) IntraMuscular once  hydrocortisone 1% Cream 1 Application(s) Topical daily  hydrocortisone 2.5% Rectal Cream 1 Application(s) Rectal two times a day  HYDROmorphone PCA (1 mG/mL) 30 milliLiter(s) PCA Continuous PCA Continuous  insulin lispro (ADMELOG) corrective regimen sliding scale   SubCutaneous three times a day before meals  levalbuterol Inhalation 1.25 milliGRAM(s) Inhalation every 6 hours  methadone    Tablet 5 milliGRAM(s) Oral <User Schedule>  octreotide  Injectable 100 MICROGram(s) SubCutaneous two times a day  pantoprazole  Injectable 40 milliGRAM(s) IV Push every 12 hours  predniSONE   Tablet 20 milliGRAM(s) Oral daily  pregabalin 200 milliGRAM(s) Oral every 8 hours  sodium chloride 0.9% with potassium chloride 20 mEq/L 1000 milliLiter(s) IV Continuous <Continuous>                            8.1    9.59  )-----------( 158      ( 20 Jan 2025 07:25 )             27.1     01-20    138  |  103  |  4.9[L]  ----------------------------<  167[H]  3.9   |  25.0  |  0.51    Ca    8.0[L]      20 Jan 2025 07:25    TPro  5.5[L]  /  Alb  2.3[L]  /  TBili  0.6  /  DBili  x   /  AST  19  /  ALT  13  /  AlkPhos  165[H]  01-20    RECENT CULTURES:  01-17 @ 13:02 .Blood BLOOD     No growth at 48 Hours    01-17 @ 12:58 .Blood BLOOD     No growth at 48 Hours    01-11 @ 05:25 .Blood BLOOD     No growth at 5 days    01-11 @ 05:20 .Blood BLOOD     No growth at 5 days    01-10 @ 18:20 .Surgical Swab     No growth at 5 days    01-10 @ 12:20 .Blood BLOOD     No growth at 5 days    01-09 @ 04:57 .Blood BLOOD Staphylococcus aureus    Growth in aerobic bottle: Staphylococcus aureus  Growth in aerobic bottle: Gram Positive Cocci in Clusters    01-08 @ 06:20 .Blood BLOOD     Growth in aerobic and anaerobic bottles: Staphylococcus aureus  See previous culture [id number]  Growth in anaerobic bottle: Gram Positive Cocci in Clusters  Growth in aerobic bottle: Gram Positive Cocci in Clusters    01-07 @ 06:19 .Blood BLOOD     Growth in aerobic and anaerobic bottles: Staphylococcus aureus  See previous culture [id number]  Growth in aerobic bottle: Gram Positive Cocci in Clusters  Growth in anaerobic bottle: Gram Positive Cocci in Clusters    01-07 @ 06:15 .Blood BLOOD     Growth in aerobic and anaerobic bottles: Staphylococcus aureus  See previous culture [id number]  Growth in aerobic bottle: Gram Positive Cocci in Clusters  Growth in anaerobic bottle: Gram Positive Cocci in Clusters      WBC Count: 9.59 K/uL (01-20-25 @ 07:25)  WBC Count: 9.09 K/uL (01-20-25 @ 00:44)  WBC Count: 8.96 K/uL (01-19-25 @ 07:18)  WBC Count: 9.26 K/uL (01-18-25 @ 04:55)  WBC Count: 10.49 K/uL (01-17-25 @ 14:38)  WBC Count: 9.75 K/uL (01-17-25 @ 06:45)  WBC Count: 5.12 K/uL (01-16-25 @ 16:55)  WBC Count: 6.70 K/uL (01-16-25 @ 11:25)  WBC Count: 6.88 K/uL (01-16-25 @ 06:10)    Creatinine: 0.51 mg/dL (01-20-25 @ 07:25)  Creatinine: 0.42 mg/dL (01-19-25 @ 07:18)  Creatinine: 0.50 mg/dL (01-18-25 @ 04:55)  Creatinine: 0.70 mg/dL (01-17-25 @ 06:45)  Creatinine: 0.73 mg/dL (01-16-25 @ 06:10)    Procalcitonin: 1.37 ng/mL (01-08-25 @ 06:20)  Procalcitonin: 2.32 ng/mL (01-07-25 @ 06:15)     SARS-CoV-2: NotDetec (01-05-25 @ 10:54)  SARS-CoV-2 Result: NotDetec (01-02-25 @ 19:20)          Influenza AH3 (RapRVP): Detected (01.05.25 @ 10:54)    Urinalysis + Microscopic Examination (01.06.25 @ 03:00)    pH Urine: 6.0   Urine Appearance: Clear   Color: Yellow   Specific Gravity: 1.015   Protein, Urine: Trace mg/dL   Glucose Qualitative, Urine: Negative mg/dL   Ketone - Urine: Negative mg/dL   Blood, Urine: Small   Bilirubin: Negative   Urobilinogen: 1.0 mg/dL   Leukocyte Esterase Concentration: Small   Nitrite: Negative   White Blood Cell - Urine: 6 /HPF   Red Blood Cell - Urine: 5 /HPF   Bacteria: Occasional /HPF   Epithelial Cells: 2 /HPF      GI PCR Panel Stool (01.15.25 @ 06:15)    GI PCR Panel: Detected   Giardia lamblia: Detected   Norovirus GI/GII: Detected              < from: TTE Limited W or WO Ultrasound Enhancing Agent (01.06.25 @ 12:55) >  CONCLUSIONS:      1. Technically difficult image quality.   2. Left ventricular systolic function is normal with an ejection fraction visually estimated at 65 to 70 %.   3. Normal right ventricular cavity size, with normal wall thickness, and normal right ventricular systolic function.   4. Compared to the transthoracic echocardiogram performed on 7/21/2024, there have been no significant interval changes.    < end of copied text >

## 2025-01-21 LAB
ALBUMIN SERPL ELPH-MCNC: 2.3 G/DL — LOW (ref 3.3–5.2)
ALP SERPL-CCNC: 140 U/L — HIGH (ref 40–120)
ALT FLD-CCNC: 12 U/L — SIGNIFICANT CHANGE UP
ANION GAP SERPL CALC-SCNC: 10 MMOL/L — SIGNIFICANT CHANGE UP (ref 5–17)
AST SERPL-CCNC: 19 U/L — SIGNIFICANT CHANGE UP
BILIRUB SERPL-MCNC: 0.5 MG/DL — SIGNIFICANT CHANGE UP (ref 0.4–2)
BUN SERPL-MCNC: 4.5 MG/DL — LOW (ref 8–20)
CALCIUM SERPL-MCNC: 8 MG/DL — LOW (ref 8.4–10.5)
CHLORIDE SERPL-SCNC: 105 MMOL/L — SIGNIFICANT CHANGE UP (ref 96–108)
CO2 SERPL-SCNC: 24 MMOL/L — SIGNIFICANT CHANGE UP (ref 22–29)
CREAT SERPL-MCNC: 0.42 MG/DL — LOW (ref 0.5–1.3)
EGFR: 128 ML/MIN/1.73M2 — SIGNIFICANT CHANGE UP
GLUCOSE BLDC GLUCOMTR-MCNC: 117 MG/DL — HIGH (ref 70–99)
GLUCOSE BLDC GLUCOMTR-MCNC: 133 MG/DL — HIGH (ref 70–99)
GLUCOSE BLDC GLUCOMTR-MCNC: 164 MG/DL — HIGH (ref 70–99)
GLUCOSE BLDC GLUCOMTR-MCNC: 73 MG/DL — SIGNIFICANT CHANGE UP (ref 70–99)
GLUCOSE SERPL-MCNC: 132 MG/DL — HIGH (ref 70–99)
HCT VFR BLD CALC: 25.2 % — LOW (ref 34.5–45)
HGB BLD-MCNC: 7.5 G/DL — LOW (ref 11.5–15.5)
MCHC RBC-ENTMCNC: 28.8 PG — SIGNIFICANT CHANGE UP (ref 27–34)
MCHC RBC-ENTMCNC: 29.8 G/DL — LOW (ref 32–36)
MCV RBC AUTO: 96.9 FL — SIGNIFICANT CHANGE UP (ref 80–100)
PLATELET # BLD AUTO: 156 K/UL — SIGNIFICANT CHANGE UP (ref 150–400)
POTASSIUM SERPL-MCNC: 3.8 MMOL/L — SIGNIFICANT CHANGE UP (ref 3.5–5.3)
POTASSIUM SERPL-SCNC: 3.8 MMOL/L — SIGNIFICANT CHANGE UP (ref 3.5–5.3)
PROCALCITONIN SERPL-MCNC: 0.15 NG/ML — HIGH (ref 0.02–0.1)
PROT SERPL-MCNC: 5.1 G/DL — LOW (ref 6.6–8.7)
RBC # BLD: 2.6 M/UL — LOW (ref 3.8–5.2)
RBC # FLD: 25.3 % — HIGH (ref 10.3–14.5)
SODIUM SERPL-SCNC: 139 MMOL/L — SIGNIFICANT CHANGE UP (ref 135–145)
WBC # BLD: 8.6 K/UL — SIGNIFICANT CHANGE UP (ref 3.8–10.5)
WBC # FLD AUTO: 8.6 K/UL — SIGNIFICANT CHANGE UP (ref 3.8–10.5)

## 2025-01-21 PROCEDURE — G0545: CPT

## 2025-01-21 PROCEDURE — 99233 SBSQ HOSP IP/OBS HIGH 50: CPT

## 2025-01-21 PROCEDURE — 93970 EXTREMITY STUDY: CPT | Mod: 26

## 2025-01-21 PROCEDURE — 99232 SBSQ HOSP IP/OBS MODERATE 35: CPT

## 2025-01-21 RX ORDER — HYDROMORPHONE HYDROCHLORIDE 4 MG/ML
0.5 INJECTION, SOLUTION INTRAMUSCULAR; INTRAVENOUS; SUBCUTANEOUS
Refills: 0 | Status: DISCONTINUED | OUTPATIENT
Start: 2025-01-21 | End: 2025-01-28

## 2025-01-21 RX ORDER — OLANZAPINE 10 MG/1
2.5 TABLET, FILM COATED ORAL AT BEDTIME
Refills: 0 | Status: DISCONTINUED | OUTPATIENT
Start: 2025-01-21 | End: 2025-01-31

## 2025-01-21 RX ORDER — HYDROMORPHONE HYDROCHLORIDE 4 MG/ML
30 INJECTION, SOLUTION INTRAMUSCULAR; INTRAVENOUS; SUBCUTANEOUS
Refills: 0 | Status: DISCONTINUED | OUTPATIENT
Start: 2025-01-21 | End: 2025-01-24

## 2025-01-21 RX ORDER — FLUOXETINE HCL 10 MG
80 CAPSULE ORAL AT BEDTIME
Refills: 0 | Status: DISCONTINUED | OUTPATIENT
Start: 2025-01-21 | End: 2025-01-25

## 2025-01-21 RX ORDER — METHYLPHENIDATE HYDROCHLORIDE 36 MG/1
20 TABLET, EXTENDED RELEASE ORAL
Refills: 0 | Status: DISCONTINUED | OUTPATIENT
Start: 2025-01-21 | End: 2025-01-28

## 2025-01-21 RX ORDER — MEMANTINE HYDROCHLORIDE 7 MG/1
5 CAPSULE, EXTENDED RELEASE ORAL AT BEDTIME
Refills: 0 | Status: DISCONTINUED | OUTPATIENT
Start: 2025-01-21 | End: 2025-01-31

## 2025-01-21 RX ORDER — MEMANTINE HYDROCHLORIDE 7 MG/1
10 CAPSULE, EXTENDED RELEASE ORAL
Refills: 0 | Status: DISCONTINUED | OUTPATIENT
Start: 2025-01-21 | End: 2025-01-31

## 2025-01-21 RX ADMIN — OLANZAPINE 2.5 MILLIGRAM(S): 10 TABLET, FILM COATED ORAL at 21:31

## 2025-01-21 RX ADMIN — Medication 1.25 MILLIGRAM(S): at 14:30

## 2025-01-21 RX ADMIN — PANTOPRAZOLE 40 MILLIGRAM(S): 20 TABLET, DELAYED RELEASE ORAL at 17:09

## 2025-01-21 RX ADMIN — DEXTROMETHORPHAN HBR AND GUAIFENESIN ORAL SOLUTION 10 MILLILITER(S): 10; 100 LIQUID ORAL at 14:05

## 2025-01-21 RX ADMIN — Medication 1 APPLICATION(S): at 14:04

## 2025-01-21 RX ADMIN — Medication 1 APPLICATION(S): at 17:10

## 2025-01-21 RX ADMIN — NAFCILLIN INJECTION 200 GRAM(S): 2 POWDER, FOR SOLUTION INTRAMUSCULAR; INTRAMUSCULAR; INTRAVENOUS at 17:09

## 2025-01-21 RX ADMIN — MEMANTINE HYDROCHLORIDE 5 MILLIGRAM(S): 7 CAPSULE, EXTENDED RELEASE ORAL at 21:33

## 2025-01-21 RX ADMIN — OCTREOTIDE ACETATE 100 MICROGRAM(S): 1000 INJECTION INTRAVENOUS; SUBCUTANEOUS at 17:09

## 2025-01-21 RX ADMIN — HYDROMORPHONE HYDROCHLORIDE 30 MILLILITER(S): 4 INJECTION, SOLUTION INTRAMUSCULAR; INTRAVENOUS; SUBCUTANEOUS at 19:50

## 2025-01-21 RX ADMIN — NAFCILLIN INJECTION 200 GRAM(S): 2 POWDER, FOR SOLUTION INTRAMUSCULAR; INTRAMUSCULAR; INTRAVENOUS at 21:32

## 2025-01-21 RX ADMIN — DEXTROSE MONOHYDRATE, SODIUM CHLORIDE, AND POTASSIUM CHLORIDE 100 MILLILITER(S): 50; 2.25; 2.24 INJECTION, SOLUTION INTRAVENOUS at 09:33

## 2025-01-21 RX ADMIN — DEXTROMETHORPHAN HBR AND GUAIFENESIN ORAL SOLUTION 10 MILLILITER(S): 10; 100 LIQUID ORAL at 10:30

## 2025-01-21 RX ADMIN — DEXTROSE MONOHYDRATE, SODIUM CHLORIDE, AND POTASSIUM CHLORIDE 100 MILLILITER(S): 50; 2.25; 2.24 INJECTION, SOLUTION INTRAVENOUS at 17:04

## 2025-01-21 RX ADMIN — HYDROMORPHONE HYDROCHLORIDE 30 MILLILITER(S): 4 INJECTION, SOLUTION INTRAMUSCULAR; INTRAVENOUS; SUBCUTANEOUS at 16:46

## 2025-01-21 RX ADMIN — HYDROMORPHONE HYDROCHLORIDE 0.5 MILLIGRAM(S): 4 INJECTION, SOLUTION INTRAMUSCULAR; INTRAVENOUS; SUBCUTANEOUS at 17:19

## 2025-01-21 RX ADMIN — NAFCILLIN INJECTION 200 GRAM(S): 2 POWDER, FOR SOLUTION INTRAMUSCULAR; INTRAMUSCULAR; INTRAVENOUS at 14:03

## 2025-01-21 RX ADMIN — Medication 100 MILLIGRAM(S): at 10:30

## 2025-01-21 RX ADMIN — PANTOPRAZOLE 40 MILLIGRAM(S): 20 TABLET, DELAYED RELEASE ORAL at 05:06

## 2025-01-21 RX ADMIN — Medication 1.25 MILLIGRAM(S): at 20:03

## 2025-01-21 RX ADMIN — DEXTROSE MONOHYDRATE, SODIUM CHLORIDE, AND POTASSIUM CHLORIDE 100 MILLILITER(S): 50; 2.25; 2.24 INJECTION, SOLUTION INTRAVENOUS at 21:33

## 2025-01-21 RX ADMIN — Medication 4 MILLILITER(S): at 03:40

## 2025-01-21 RX ADMIN — NAFCILLIN INJECTION 200 GRAM(S): 2 POWDER, FOR SOLUTION INTRAMUSCULAR; INTRAMUSCULAR; INTRAVENOUS at 09:34

## 2025-01-21 RX ADMIN — HYDROMORPHONE HYDROCHLORIDE 0.5 MILLIGRAM(S): 4 INJECTION, SOLUTION INTRAMUSCULAR; INTRAVENOUS; SUBCUTANEOUS at 21:32

## 2025-01-21 RX ADMIN — PREDNISONE 20 MILLIGRAM(S): 5 TABLET ORAL at 05:06

## 2025-01-21 RX ADMIN — Medication 1.25 MILLIGRAM(S): at 03:40

## 2025-01-21 RX ADMIN — Medication 80 MILLIGRAM(S): at 21:32

## 2025-01-21 RX ADMIN — NAFCILLIN INJECTION 200 GRAM(S): 2 POWDER, FOR SOLUTION INTRAMUSCULAR; INTRAMUSCULAR; INTRAVENOUS at 02:22

## 2025-01-21 RX ADMIN — Medication 4 MILLILITER(S): at 20:03

## 2025-01-21 RX ADMIN — Medication 4 MILLILITER(S): at 14:29

## 2025-01-21 RX ADMIN — DEXTROMETHORPHAN HBR AND GUAIFENESIN ORAL SOLUTION 10 MILLILITER(S): 10; 100 LIQUID ORAL at 21:30

## 2025-01-21 RX ADMIN — AMINOCAPROIC ACID 4 GRAM(S): 1000 TABLET ORAL at 10:31

## 2025-01-21 RX ADMIN — HYDROMORPHONE HYDROCHLORIDE 0.5 MILLIGRAM(S): 4 INJECTION, SOLUTION INTRAMUSCULAR; INTRAVENOUS; SUBCUTANEOUS at 22:32

## 2025-01-21 RX ADMIN — HYDROMORPHONE HYDROCHLORIDE 30 MILLILITER(S): 4 INJECTION, SOLUTION INTRAMUSCULAR; INTRAVENOUS; SUBCUTANEOUS at 07:07

## 2025-01-21 RX ADMIN — NAFCILLIN INJECTION 200 GRAM(S): 2 POWDER, FOR SOLUTION INTRAMUSCULAR; INTRAMUSCULAR; INTRAVENOUS at 05:05

## 2025-01-21 RX ADMIN — Medication 1.25 MILLIGRAM(S): at 08:23

## 2025-01-21 RX ADMIN — PREGABALIN CAPSULES, CV 200 MILLIGRAM(S): 225 CAPSULE ORAL at 05:06

## 2025-01-21 RX ADMIN — HYDROMORPHONE HYDROCHLORIDE 30 MILLILITER(S): 4 INJECTION, SOLUTION INTRAMUSCULAR; INTRAVENOUS; SUBCUTANEOUS at 02:05

## 2025-01-21 RX ADMIN — Medication 1 APPLICATION(S): at 05:07

## 2025-01-21 RX ADMIN — DEXTROSE MONOHYDRATE, SODIUM CHLORIDE, AND POTASSIUM CHLORIDE 100 MILLILITER(S): 50; 2.25; 2.24 INJECTION, SOLUTION INTRAVENOUS at 10:31

## 2025-01-21 RX ADMIN — AMINOCAPROIC ACID 4 GRAM(S): 1000 TABLET ORAL at 21:32

## 2025-01-21 RX ADMIN — METHADONE HYDROCHLORIDE 5 MILLIGRAM(S): 5 SOLUTION ORAL at 05:10

## 2025-01-21 RX ADMIN — METHADONE HYDROCHLORIDE 5 MILLIGRAM(S): 5 SOLUTION ORAL at 14:03

## 2025-01-21 RX ADMIN — Medication 1: at 09:33

## 2025-01-21 RX ADMIN — AMINOCAPROIC ACID 4 GRAM(S): 1000 TABLET ORAL at 17:05

## 2025-01-21 RX ADMIN — Medication 4 MILLILITER(S): at 08:23

## 2025-01-21 RX ADMIN — AMINOCAPROIC ACID 4 GRAM(S): 1000 TABLET ORAL at 05:05

## 2025-01-21 RX ADMIN — PREGABALIN CAPSULES, CV 200 MILLIGRAM(S): 225 CAPSULE ORAL at 14:03

## 2025-01-21 RX ADMIN — ANTISEPTIC SURGICAL SCRUB 1 APPLICATION(S): 0.04 SOLUTION TOPICAL at 14:04

## 2025-01-21 RX ADMIN — PREGABALIN CAPSULES, CV 200 MILLIGRAM(S): 225 CAPSULE ORAL at 21:31

## 2025-01-21 RX ADMIN — OCTREOTIDE ACETATE 100 MICROGRAM(S): 1000 INJECTION INTRAVENOUS; SUBCUTANEOUS at 05:06

## 2025-01-21 RX ADMIN — ACETAMINOPHEN, DIPHENHYDRAMINE HCL, PHENYLEPHRINE HCL 3 MILLIGRAM(S): 325; 25; 5 TABLET ORAL at 21:31

## 2025-01-21 RX ADMIN — HYDROMORPHONE HYDROCHLORIDE 0.5 MILLIGRAM(S): 4 INJECTION, SOLUTION INTRAMUSCULAR; INTRAVENOUS; SUBCUTANEOUS at 17:04

## 2025-01-21 NOTE — PROGRESS NOTE ADULT - ASSESSMENT
39y  Female with h/o metastatic breast cancer ER/[place] Neg, HER-2 + on chemotherapy (last dose 12/26/24) (mets to lung, liver, spleen, spine, bone and brain), gastritis w/ intermittent episodes of dark stool (follows w/ Dr. Rosado GI and is on PPI and octreotide daily). Patient presented 1/5 with c/o worsening SOB.  She was seen in ED 1/2/25 for for weakness and SOB at which time she was found to have Hb of 5 requiring PRBC transfusion and positive for Flu A and started on tamiflu and was discharged from the ED on 1/3/25.  Her respiratory status has worsened since then w/ productive cough associated with body aches and chills.  Denies sick contacts but has had many frequent hospital visits. Patient has been afebrile, no leukocytosis. Was placed on BIPAP for Worsening respiratory status. continued on Tamiflu. Vancomycn and Zosyn was added. Blood cultures with MSSA. ID input requested.       MSSA bacteremia   Staphylococcus aureus PNA   Influenza A   metastatic breast cancer ER/WI Neg, HER-2 + on chemotherapy   Port in place s/p explant 1/10/25  Stool PCR + Norovirus and Giardia       - Blood cultures 1/5, 1/7, 1/9 reporting MSSA    - Repeat blood cultures 1/10,  1/11/25, 1/17/25 no growth   - Sputum Cx 1/6 Staphylococcus aureus  - Urine Cx 1/5 reporting 10k - 49k Escherichia coli  of ? significance since UA not concerning for UTI   - Stool PCR + Norovirus and Giardia   - RVP/COVID 19 PCR + Influenza A  - S/P Port removal 1/10/25  - CTA Chest reporting No acute pulmonary embolism. Wide spread patchy airspace opacities bilaterally, increased since the prior exam.  - US RUQ reporting No evidence of acute cholecystitis or biliary ductal dilatation.  - Procalcitonin level 2.32 --> 1.37, ordered for the AM   - TTE 1/6 with no veg  - Called cardiology consult for MARYANN if possible   - GI eval noted, no plan for EGD   - s/p Port removal 1/10/25  - Completed oseltamivir  1/10/25  - Continue Nafcillin 2gm IV [id number]  - For Norovirus, continue hydration and supportive care  - For Giardia, Completed 5 days of Albendazole   - Hold off on PICC/Midline for now unless needed for reasons other than infectious diseases  - Follow up cultures  - Trend Fever  - Trend WBC      Thank you for allowing me to participate in the care of your patient.   Will Follow    Discussed treatment plan with:  Clinical pharmacy, Dr Kennedy

## 2025-01-21 NOTE — PROGRESS NOTE ADULT - SUBJECTIVE AND OBJECTIVE BOX
Chief Complaint:    HPI: 40 yo F with h/o metastatic breast cancer ER/CT Neg, HER-2 + on chemotherapy (last dose 12/26/24) (mets to lung, liver, spleen, spine, bone and brain).     Interval Hx:  Patient seen during rounds  Patient reports pain to be mild/mod controlled on current medications  Patient denies sedation with medications      PAST MEDICAL & SURGICAL HISTORY:  H/O compression fracture of spine      Anxiety      Metastatic breast cancer      H/O pleural effusion      Pericardial effusion      S/P tonsillectomy      H/O chest tube placement  12/23/21      S/P pericardiocentesis  12/28/21          FAMILY HISTORY:  FH: CVA (cerebrovascular accident)        SOCIAL HISTORY:  [ ] Denies Smoking, Alcohol, or Drug Use    Allergies    pertuzumab (Other (Severe))  Perjeta (Other (Severe))    Intolerances        PAIN MEDICATIONS:  acetaminophen     Tablet .. 650 milliGRAM(s) Oral every 6 hours PRN  HYDROmorphone  Injectable 0.5 milliGRAM(s) IV Push every 6 hours PRN  HYDROmorphone PCA (1 mG/mL) 30 milliLiter(s) PCA Continuous PCA Continuous  melatonin 3 milliGRAM(s) Oral at bedtime PRN  methadone    Tablet 5 milliGRAM(s) Oral <User Schedule>  ondansetron Injectable 4 milliGRAM(s) IV Push every 8 hours PRN  pregabalin 200 milliGRAM(s) Oral every 8 hours    Heme:  aminocaproic acid Tablet 4 Gram(s) Oral every 6 hours    Antibiotics:  nafcillin  IVPB 2 Gram(s) IV Intermittent every 4 hours    Cardiovascular:    GI:  aluminum hydroxide/magnesium hydroxide/simethicone Suspension 30 milliLiter(s) Oral every 4 hours PRN  pantoprazole  Injectable 40 milliGRAM(s) IV Push every 12 hours    Endocrine:  dextrose 50% Injectable 25 Gram(s) IV Push once  dextrose 50% Injectable 12.5 Gram(s) IV Push once  dextrose 50% Injectable 25 Gram(s) IV Push once  dextrose Oral Gel 15 Gram(s) Oral once PRN  glucagon  Injectable 1 milliGRAM(s) IntraMuscular once  insulin lispro (ADMELOG) corrective regimen sliding scale   SubCutaneous three times a day before meals  octreotide  Injectable 100 MICROGram(s) SubCutaneous two times a day  predniSONE   Tablet 20 milliGRAM(s) Oral daily    All Other Medications:  chlorhexidine 2% Cloths 1 Application(s) Topical daily  dextrose 5%. 1000 milliLiter(s) IV Continuous <Continuous>  dextrose 5%. 1000 milliLiter(s) IV Continuous <Continuous>  hydrocortisone 1% Cream 1 Application(s) Topical daily  hydrocortisone 2.5% Rectal Cream 1 Application(s) Rectal two times a day  naloxone Injectable 0.1 milliGRAM(s) IV Push every 3 minutes PRN  sodium chloride 0.9% with potassium chloride 20 mEq/L 1000 milliLiter(s) IV Continuous <Continuous>    Vital Signs Last 24 Hrs  T(C): 36.4 (21 Jan 2025 08:00), Max: 37 (21 Jan 2025 00:11)  T(F): 97.6 (21 Jan 2025 08:00), Max: 98.6 (21 Jan 2025 00:11)  HR: 81 (21 Jan 2025 12:00) (78 - 104)  BP: 92/60 (21 Jan 2025 12:00) (90/79 - 125/58)  BP(mean): 71 (21 Jan 2025 12:00) (53 - 84)  RR: 17 (21 Jan 2025 12:00) (16 - 21)  SpO2: 100% (21 Jan 2025 12:00) (94% - 100%)    Parameters below as of 21 Jan 2025 12:00  Patient On (Oxygen Delivery Method): nasal cannula  O2 Flow (L/min): 2      PAIN SCORE:         SCALE USED: (1-10 VNRS)               LABS:                          7.5    8.60  )-----------( 156      ( 21 Jan 2025 06:58 )             25.2     01-21    139  |  105  |  4.5[L]  ----------------------------<  132[H]  3.8   |  24.0  |  0.42[L]    Ca    8.0[L]      21 Jan 2025 06:58    TPro  5.1[L]  /  Alb  2.3[L]  /  TBili  0.5  /  DBili  x   /  AST  19  /  ALT  12  /  AlkPhos  140[H]  01-21      Urinalysis Basic - ( 21 Jan 2025 06:58 )    Color: x / Appearance: x / SG: x / pH: x  Gluc: 132 mg/dL / Ketone: x  / Bili: x / Urobili: x   Blood: x / Protein: x / Nitrite: x   Leuk Esterase: x / RBC: x / WBC x   Sq Epi: x / Non Sq Epi: x / Bacteria: x        ASSESSMENT AND PLAN  Assessment:  40 yo F with h/o metastatic breast cancer ER/CT Neg, HER-2 + on chemotherapy (last dose 12/26/24) (mets to lung, liver, spleen, spine, bone and brain).     Pain management following for pain control.      Recommendations:   pending    Pain management [phone] HPI: 40 yo F with h/o metastatic breast cancer ER/ND Neg, HER-2 + on chemotherapy (last dose 12/26/24) (mets to lung, liver, spleen, spine, bone and brain).     Interval Hx:  Patient seen during rounds  Patient reports pain to be moderately controlled on current medications  Patient denies sedation with medications    Allergies    pertuzumab (Other (Severe))  Perjeta (Other (Severe))    Intolerances      PAIN MEDICATIONS:  acetaminophen     Tablet .. 650 milliGRAM(s) Oral every 6 hours PRN  HYDROmorphone  Injectable 0.5 milliGRAM(s) IV Push every 6 hours PRN  HYDROmorphone PCA (1 mG/mL) 30 milliLiter(s) PCA Continuous PCA Continuous  melatonin 3 milliGRAM(s) Oral at bedtime PRN  methadone    Tablet 5 milliGRAM(s) Oral <User Schedule>  ondansetron Injectable 4 milliGRAM(s) IV Push every 8 hours PRN  pregabalin 200 milliGRAM(s) Oral every 8 hours    Heme:  aminocaproic acid Tablet 4 Gram(s) Oral every 6 hours    Antibiotics:  nafcillin  IVPB 2 Gram(s) IV Intermittent every 4 hours    Cardiovascular:    GI:  aluminum hydroxide/magnesium hydroxide/simethicone Suspension 30 milliLiter(s) Oral every 4 hours PRN  pantoprazole  Injectable 40 milliGRAM(s) IV Push every 12 hours    Endocrine:  dextrose 50% Injectable 25 Gram(s) IV Push once  dextrose 50% Injectable 12.5 Gram(s) IV Push once  dextrose 50% Injectable 25 Gram(s) IV Push once  dextrose Oral Gel 15 Gram(s) Oral once PRN  glucagon  Injectable 1 milliGRAM(s) IntraMuscular once  insulin lispro (ADMELOG) corrective regimen sliding scale   SubCutaneous three times a day before meals  octreotide  Injectable 100 MICROGram(s) SubCutaneous two times a day  predniSONE   Tablet 20 milliGRAM(s) Oral daily    All Other Medications:  chlorhexidine 2% Cloths 1 Application(s) Topical daily  dextrose 5%. 1000 milliLiter(s) IV Continuous <Continuous>  dextrose 5%. 1000 milliLiter(s) IV Continuous <Continuous>  hydrocortisone 1% Cream 1 Application(s) Topical daily  hydrocortisone 2.5% Rectal Cream 1 Application(s) Rectal two times a day  naloxone Injectable 0.1 milliGRAM(s) IV Push every 3 minutes PRN  sodium chloride 0.9% with potassium chloride 20 mEq/L 1000 milliLiter(s) IV Continuous <Continuous>    Vital Signs Last 24 Hrs  T(C): 36.4 (21 Jan 2025 08:00), Max: 37 (21 Jan 2025 00:11)  T(F): 97.6 (21 Jan 2025 08:00), Max: 98.6 (21 Jan 2025 00:11)  HR: 81 (21 Jan 2025 12:00) (78 - 104)  BP: 92/60 (21 Jan 2025 12:00) (90/79 - 125/58)  BP(mean): 71 (21 Jan 2025 12:00) (53 - 84)  RR: 17 (21 Jan 2025 12:00) (16 - 21)  SpO2: 100% (21 Jan 2025 12:00) (94% - 100%)    Parameters below as of 21 Jan 2025 12:00  Patient On (Oxygen Delivery Method): nasal cannula  O2 Flow (L/min): 2      PAIN SCORE:   4   SCALE USED: (1-10 VNRS)               LABS:                          7.5    8.60  )-----------( 156      ( 21 Jan 2025 06:58 )             25.2     01-21    139  |  105  |  4.5[L]  ----------------------------<  132[H]  3.8   |  24.0  |  0.42[L]    Ca    8.0[L]      21 Jan 2025 06:58    TPro  5.1[L]  /  Alb  2.3[L]  /  TBili  0.5  /  DBili  x   /  AST  19  /  ALT  12  /  AlkPhos  140[H]  01-21      Urinalysis Basic - ( 21 Jan 2025 06:58 )    Color: x / Appearance: x / SG: x / pH: x  Gluc: 132 mg/dL / Ketone: x  / Bili: x / Urobili: x   Blood: x / Protein: x / Nitrite: x   Leuk Esterase: x / RBC: x / WBC x   Sq Epi: x / Non Sq Epi: x / Bacteria: x        ASSESSMENT AND PLAN  Assessment:  40 yo F with h/o metastatic breast cancer ER/ND Neg, HER-2 + on chemotherapy (last dose 12/26/24) (mets to lung, liver, spleen, spine, bone and brain).     Patient reports her pain to be moderately controlled on current medications. She has been using dPCA, but then running short due to the max 4 hour limit. Patient denies sedation with current medications.    Recommendations:    - (Ordered) dPCA 0/0.4/8/10   - (Ordered) Dilaudid 0.5mg IVP q3h prn severe BTP ONLY. Hold for sedation or unstable vitals.   - Continue Methadone 5mg po bid at 0600 and 1400   - Continue Methadone 10mg po qHS   - Continue home Lyrica 200mg po q8h    When due for discharge:   - DC Dilaudid PCA   - Recommend starting Dilaudid PO 4/8mg q4h PRN mod/severe pain        Will continue to follow. Please call pain management with any questions [phone]

## 2025-01-21 NOTE — PROGRESS NOTE ADULT - SUBJECTIVE AND OBJECTIVE BOX
39y  Female with h/o metastatic breast cancer ER/CA Neg, HER-2 + on chemotherapy (last dose 12/26/24) (mets to lung, liver, spleen, spine, bone and brain), gastritis w/ intermittent episodes of dark stool (follows w/ Dr. Rosado GI and is on PPI and octreotide daily). Patient presented 1/5 with c/o worsening SOB.  She was seen in ED 1/2/25 for for weakness and SOB at which time she was found to have Hb of 5 requiring PRBC transfusion and positive for Flu A and started on tamiflu and was discharged from the ED on 1/3/25.  Her respiratory status has worsened since then w/ productive cough associated with body aches and chills.  Denies sick contacts but has had many frequent hospital visits. Patient has been afebrile, no leukocytosis. Was placed on BIPAP for Worsening respiratory status. continued on Tamiflu. Vancomycn and Zosyn was added. Blood cultures with MSSA.     PAST MEDICAL & SURGICAL HISTORY:  H/O compression fracture of spine  Anxiety  Metastatic breast cancer  H/O pleural effusion  Pericardial effusion  S/P tonsillectomy  H/O chest tube placement  12/23/21  S/P pericardiocentesis  12/28/21    Allergies  pertuzumab (Other (Severe))  Perjeta (Other (Severe))    MEDICATIONS  (STANDING):  acetylcysteine 10%  Inhalation 4 milliLiter(s) Inhalation every 6 hours  albendazole 400 milliGRAM(s) Oral daily  aminocaproic acid Tablet 4 Gram(s) Oral every 6 hours  chlorhexidine 2% Cloths 1 Application(s) Topical daily  dextrose 5%. 1000 milliLiter(s) (50 mL/Hr) IV Continuous <Continuous>  dextrose 5%. 1000 milliLiter(s) (100 mL/Hr) IV Continuous <Continuous>  dextrose 50% Injectable 25 Gram(s) IV Push once  dextrose 50% Injectable 12.5 Gram(s) IV Push once  dextrose 50% Injectable 25 Gram(s) IV Push once  glucagon  Injectable 1 milliGRAM(s) IntraMuscular once  hydrocortisone 2.5% Rectal Cream 1 Application(s) Rectal two times a day  HYDROmorphone PCA (1 mG/mL) 30 milliLiter(s) PCA Continuous PCA Continuous  insulin lispro (ADMELOG) corrective regimen sliding scale   SubCutaneous three times a day before meals  levalbuterol Inhalation 1.25 milliGRAM(s) Inhalation every 6 hours  methadone    Tablet 5 milliGRAM(s) Oral <User Schedule>  nafcillin  IVPB 2 Gram(s) IV Intermittent every 4 hours  octreotide  Injectable 100 MICROGram(s) SubCutaneous two times a day  pantoprazole  Injectable 40 milliGRAM(s) IV Push every 12 hours  predniSONE   Tablet 20 milliGRAM(s) Oral daily  pregabalin 200 milliGRAM(s) Oral every 8 hours  sodium chloride 0.9% with potassium chloride 20 mEq/L 1000 milliLiter(s) (100 mL/Hr) IV Continuous <Continuous>    MEDICATIONS  (PRN):  acetaminophen     Tablet .. 650 milliGRAM(s) Oral every 6 hours PRN Temp greater or equal to 38C (100.4F), Mild Pain (1 - 3)  aluminum hydroxide/magnesium hydroxide/simethicone Suspension 30 milliLiter(s) Oral every 4 hours PRN Dyspepsia  benzonatate 100 milliGRAM(s) Oral every 8 hours PRN Cough  dextrose Oral Gel 15 Gram(s) Oral once PRN Blood Glucose LESS THAN 70 milliGRAM(s)/deciliter  guaifenesin/dextromethorphan Oral Liquid 10 milliLiter(s) Oral every 4 hours PRN Cough  melatonin 3 milliGRAM(s) Oral at bedtime PRN Insomnia  naloxone Injectable 0.1 milliGRAM(s) IV Push every 3 minutes PRN For ANY of the following changes in patient status:  A. RR LESS THAN 10 breaths per minute, B. Oxygen saturation LESS THAN 90%, C. Sedation score of 6  ondansetron Injectable 4 milliGRAM(s) IV Push every 8 hours PRN Nausea and/or Vomiting      Vital Signs Last 24 Hrs  T(C): 36.4 (21 Jan 2025 08:00), Max: 37 (21 Jan 2025 00:11)  T(F): 97.6 (21 Jan 2025 08:00), Max: 98.6 (21 Jan 2025 00:11)  HR: 83 (21 Jan 2025 08:23) (78 - 104)  BP: 90/79 (21 Jan 2025 08:00) (90/79 - 125/58)  BP(mean): 84 (21 Jan 2025 08:00) (53 - 84)  RR: 16 (21 Jan 2025 08:00) (16 - 21)  SpO2: 100% (21 Jan 2025 08:00) (94% - 100%)    Parameters below as of 21 Jan 2025 08:23  Patient On (Oxygen Delivery Method): nasal cannula w/ humidification, 2L        PE:  NAD  On O2 NC  bilateral rales  abd soft, nd, nt  a/o x 3      CBC                          7.5    8.60  )-----------( 156      ( 21 Jan 2025 06:58 )             25.2                             8.1    9.59  )-----------( 158      ( 20 Jan 2025 07:25 )             27.1                             8.3    8.96  )-----------( 156      ( 19 Jan 2025 07:18 )             27.8                           8.6    9.26  )-----------( 136      ( 18 Jan 2025 04:55 )             27.8                             6.4    9.75  )-----------( 130      ( 17 Jan 2025 06:45 )             20.7         Chem:       01-21    139  |  105  |  4.5[L]  ----------------------------<  132[H]  3.8   |  24.0  |  0.42[L]    Ca    8.0[L]      21 Jan 2025 06:58    TPro  5.1[L]  /  Alb  2.3[L]  /  TBili  0.5  /  DBili  x   /  AST  19  /  ALT  12  /  AlkPhos  140[H]  01-21 01-20    138  |  103  |  4.9[L]  ----------------------------<  167[H]  3.9   |  25.0  |  0.51    Ca    8.0[L]      20 Jan 2025 07:25    TPro  5.5[L]  /  Alb  2.3[L]  /  TBili  0.6  /  DBili  x   /  AST  19  /  ALT  13  /  AlkPhos  165[H]  01-20 01-17    139  |  104  |  8.7  ----------------------------<  163[H]  2.9[LL]   |  26.0  |  0.70    Ca    7.3[L]      17 Jan 2025 06:45  Phos  4.2     01-17  Mg     1.5     01-17    TPro  5.0[L]  /  Alb  2.1[L]  /  TBili  0.8  /  DBili  0.3  /  AST  17  /  ALT  13  /  AlkPhos  127[H]  01-16      01-15    138  |  102  |  12.3  ----------------------------<  135[H]  3.6   |  25.0  |  0.67    Ca    8.1[L]      15 Cristhian 2025 06:10      01-14    141  |  103  |  13.4  ----------------------------<  127[H]  3.4[L]   |  30.0[H]  |  0.54    Ca    8.1[L]      14 Jan 2025 04:48        01-13    141  |  103  |  20.3[H]  ----------------------------<  137[H]  3.8   |  27.0  |  0.47[L]    Ca    8.2[L]      13 Jan 2025 06:10

## 2025-01-21 NOTE — PROGRESS NOTE ADULT - SUBJECTIVE AND OBJECTIVE BOX
[facility]  INFECTIOUS DISEASES at [place] / [place] / [place]  =======================================================                              Salazar Toro MD                              Professor Emeritus:  Dr Warner Mcintosh MD            Diplomates American Board of Internal Medicine & Infectious Diseases                                   Tel  [phone] Fax [phone]                                  Hospital Consult line:  [phone]  =======================================================      [place] [id number]    Follow up: MSSA bacteremia    No fevers   Diarrhea improved   Hypotensive this AM     Allergies:  pertuzumab (Other (Severe))  Perjeta (Other (Severe))        REVIEW OF SYSTEMS:  CONSTITUTIONAL:  No Fever or chills  HEENT:   No diplopia or blurred vision.  No earache, sore throat or runny nose.  CARDIOVASCULAR:  No Chest Pain  RESPIRATORY:  No cough, shortness of breath  GASTROINTESTINAL:  No nausea, vomiting + diarrhea.  GENITOURINARY:  No dysuria, frequency or urgency. No Blood in urine  MUSCULOSKELETAL:  no joint aches, no muscle pain  SKIN:  No change in skin, hair or nails.  NEUROLOGIC:  No Headaches      Physical Exam:  GEN: NAD  HEENT: normocephalic and atraumatic.    NECK: Supple.   LUNGS: CTA B/L.  HEART: RRR  ABDOMEN: Soft, NT, ND.  +BS.    : No CVA tenderness  EXTREMITIES: Without  edema.  MSK: No joint swelling  NEUROLOGIC: No Focal Deficits   SKIN: No rash      Vitals:  T(F): 97.6 (21 Jan 2025 08:00), Max: 98.6 (21 Jan 2025 00:11)  HR: 86 (21 Jan 2025 10:26)  BP: 101/62 (21 Jan 2025 10:26)  RR: 18 (21 Jan 2025 10:26)  SpO2: 100% (21 Jan 2025 10:26) (94% - 100%)  temp max in last 48H T(F): , Max: 98.6 (01-20-25 @ 04:02)    Current Antibiotics:  nafcillin  IVPB 2 Gram(s) IV Intermittent every 4 hours    Other medications:  acetylcysteine 10%  Inhalation 4 milliLiter(s) Inhalation every 6 hours  aminocaproic acid Tablet 4 Gram(s) Oral every 6 hours  chlorhexidine 2% Cloths 1 Application(s) Topical daily  dextrose 5%. 1000 milliLiter(s) IV Continuous <Continuous>  dextrose 5%. 1000 milliLiter(s) IV Continuous <Continuous>  dextrose 50% Injectable 25 Gram(s) IV Push once  dextrose 50% Injectable 12.5 Gram(s) IV Push once  dextrose 50% Injectable 25 Gram(s) IV Push once  glucagon  Injectable 1 milliGRAM(s) IntraMuscular once  hydrocortisone 1% Cream 1 Application(s) Topical daily  hydrocortisone 2.5% Rectal Cream 1 Application(s) Rectal two times a day  HYDROmorphone PCA (1 mG/mL) 30 milliLiter(s) PCA Continuous PCA Continuous  insulin lispro (ADMELOG) corrective regimen sliding scale   SubCutaneous three times a day before meals  levalbuterol Inhalation 1.25 milliGRAM(s) Inhalation every 6 hours  methadone    Tablet 5 milliGRAM(s) Oral <User Schedule>  octreotide  Injectable 100 MICROGram(s) SubCutaneous two times a day  pantoprazole  Injectable 40 milliGRAM(s) IV Push every 12 hours  predniSONE   Tablet 20 milliGRAM(s) Oral daily  pregabalin 200 milliGRAM(s) Oral every 8 hours  sodium chloride 0.9% with potassium chloride 20 mEq/L 1000 milliLiter(s) IV Continuous <Continuous>                            7.5    8.60  )-----------( 156      ( 21 Jan 2025 06:58 )             25.2     01-21    139  |  105  |  4.5[L]  ----------------------------<  132[H]  3.8   |  24.0  |  0.42[L]    Ca    8.0[L]      21 Jan 2025 06:58    TPro  5.1[L]  /  Alb  2.3[L]  /  TBili  0.5  /  DBili  x   /  AST  19  /  ALT  12  /  AlkPhos  140[H]  01-21    RECENT CULTURES:  01-17 @ 13:02 .Blood BLOOD     No growth at 72 Hours    01-17 @ 12:58 .Blood BLOOD     No growth at 72 Hours    01-11 @ 05:25 .Blood BLOOD     No growth at 5 days    01-11 @ 05:20 .Blood BLOOD     No growth at 5 days    01-10 @ 18:20 .Surgical Swab     No growth at 5 days    01-10 @ 12:20 .Blood BLOOD     No growth at 5 days    01-09 @ 04:57 .Blood BLOOD Staphylococcus aureus    Growth in aerobic bottle: Staphylococcus aureus  Growth in aerobic bottle: Gram Positive Cocci in Clusters    01-08 @ 06:20 .Blood BLOOD     Growth in aerobic and anaerobic bottles: Staphylococcus aureus  See previous culture [id number]  Growth in anaerobic bottle: Gram Positive Cocci in Clusters  Growth in aerobic bottle: Gram Positive Cocci in Clusters      WBC Count: 8.60 K/uL (01-21-25 @ 06:58)  WBC Count: 9.59 K/uL (01-20-25 @ 07:25)  WBC Count: 9.09 K/uL (01-20-25 @ 00:44)  WBC Count: 8.96 K/uL (01-19-25 @ 07:18)  WBC Count: 9.26 K/uL (01-18-25 @ 04:55)  WBC Count: 10.49 K/uL (01-17-25 @ 14:38)  WBC Count: 9.75 K/uL (01-17-25 @ 06:45)  WBC Count: 5.12 K/uL (01-16-25 @ 16:55)    Creatinine: 0.42 mg/dL (01-21-25 @ 06:58)  Creatinine: 0.51 mg/dL (01-20-25 @ 07:25)  Creatinine: 0.42 mg/dL (01-19-25 @ 07:18)  Creatinine: 0.50 mg/dL (01-18-25 @ 04:55)  Creatinine: 0.70 mg/dL (01-17-25 @ 06:45)    Procalcitonin: 0.15 ng/mL (01-21-25 @ 06:58)  Procalcitonin: 1.37 ng/mL (01-08-25 @ 06:20)     SARS-CoV-2: NotDetec (01-05-25 @ 10:54)  SARS-CoV-2 Result: NotDetec (01-02-25 @ 19:20)              Influenza AH3 (RapRVP): Detected (01.05.25 @ 10:54)    Urinalysis + Microscopic Examination (01.06.25 @ 03:00)    pH Urine: 6.0   Urine Appearance: Clear   Color: Yellow   Specific Gravity: 1.015   Protein, Urine: Trace mg/dL   Glucose Qualitative, Urine: Negative mg/dL   Ketone - Urine: Negative mg/dL   Blood, Urine: Small   Bilirubin: Negative   Urobilinogen: 1.0 mg/dL   Leukocyte Esterase Concentration: Small   Nitrite: Negative   White Blood Cell - Urine: 6 /HPF   Red Blood Cell - Urine: 5 /HPF   Bacteria: Occasional /HPF   Epithelial Cells: 2 /HPF      GI PCR Panel Stool (01.15.25 @ 06:15)    GI PCR Panel: Detected   Giardia lamblia: Detected   Norovirus GI/GII: Detected              < from: TTE Limited W or WO Ultrasound Enhancing Agent (01.06.25 @ 12:55) >  CONCLUSIONS:      1. Technically difficult image quality.   2. Left ventricular systolic function is normal with an ejection fraction visually estimated at 65 to 70 %.   3. Normal right ventricular cavity size, with normal wall thickness, and normal right ventricular systolic function.   4. Compared to the transthoracic echocardiogram performed on 7/21/2024, there have been no significant interval changes.    < end of copied text >

## 2025-01-21 NOTE — PROGRESS NOTE ADULT - ASSESSMENT
39y  Female with h/o metastatic breast cancer ER/[place] Neg, HER-2 + on chemotherapy (last dose 12/26/24) (mets to lung, liver, spleen, spine, bone and brain), gastritis w/ intermittent episodes of dark stool (follows w/ Dr. Rosado GI and is on PPI and octreotide daily). Patient presented 1/5 with c/o worsening SOB.  She was seen in ED 1/2/25 for for weakness and SOB at which time she was found to have Hb of 5 requiring PRBC transfusion and positive for Flu A and started on tamiflu and was discharged from the ED on 1/3/25.  Her respiratory status has worsened since then w/ productive cough associated with body aches and chills. Admitted for sepsis ,acute hypoxic respiratory failure with flu A. s/p HFNC for worsening respiratory status. S/P course of Tamiflu. Vancomycin and Zosyn was added for possible superimposed bacterial pna. Blood cultures with MSSA.  Cardiology consultation requested for evaluation of endocarditis. Blood cultures 1/5, 1/7, 1/9 reporting MSSA , Repeat blood cultures ngtd on 1/11/25, Sputum Cx 1/6 Staphylococcus aureus. MARYANN hold for active gib requring transfusion. Pt had large BM likley diverticular bleed. Had recent scopy. Gi following. ct abd/pelvis w/iv contrast no active bleed (1/13). Urine Cx 1/5 reporting 10k - 49k Escherichia coli  of ? significance  since UA not concerning for UTI.  RVP/COVID 19 PCR + Influenza A.GI PCR  positive norovirus,Giardia.    #Nororvirus,Giadia gastroenteritis   -GI PCR  positive norovirus,Giadia  -Albendazole per ID (dc on 01/22 -- 5 day course)  -ID on board, recs noted  -fluids as needed, encourage po hydration  -bm less bloody, improving    #Acute blood loss anemia   #GIB  -s/p multiple transfusions during this hospital stay  -goal hgb of 7   -CTA abd/pelvis  no active bleed   -PPI/OCTRIO  -GI eval with no plan for scopes 2/2 recently done.  Still with bleeding.  reed GI history, ? GAVE disease per GI notes Has had multiple EGD/colonoscopies in past as well.   -spoke to GI 01/21: multiple scopes done - no sources of bleeding identified -- no plan for any endoscopy in the near future // will discuss utility of tagged RBC scan  -hem/onc following  -hem/onc started Amicar on1/16  -bm with less blood       #Thrombocytopenia  -likely from chemo/sepsis  -will monitor cbc        #Sepsis 2/2 Flu A w/ superimposed multifocal PNA  #Acute hypoxic respiratory failure 2/2 Flu A w/ superimposed multifocal PNA   - on NC, comfortable  - persistently positive RVP for flu A  - s/p  tamiflu   - repeat CT 1/8 shows improved pleural effusion but worsening GGOs, unclear if infectious or malignant etiology  - repeat CT 1/9 with similar findings  - repeat CT next week to evaluate for improvement of infiltrates  - po steroid tapering dose  - c/w abx per ID recs  - sputum clx growing moderate staph aureus   - TTE EF 65-70%, no WMA    #MSSA bacteremia  - blood clx positive 1/5, repeat bl c/s (1/11) ngtd  - ID following  - c/w nafcillin  per id   - MARYANN on hold for gib. f/u cardio rec  -f/u ID REC        #Chronic normocytic anemia 2/2 metastatic breast cancer on chemo and 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   #Thrombocytopenia likely 2/2 sepsis   #Breast cancer ER/[place] Neg, HER-2 pos on chemotherapy w/ mets to lung, liver, spleen, spine, bone and brain  - Baseline Hb ranges 8-9    - s/p prbc transfusion on 1/2, 1/8 and 1/9,1/11,1/13,1/14  - trend CBC, transfuse for hgb <7  - c/w home sub q octreotide and IV protonix  - Monitor CBC and transfuse for Hb<7 and plts<20K in setting of sepsis   - [facility] following  - monitor on tele and     #Breast cancer [place] Neg, HER-2 pos on chemotherapy w/ mets to lung, liver, spleen, spine, bone and brain  - c/w pregabalin, methylphenidate (confirmed on ISTOP Reference #: [id number])  - Reports being on methadone for pain but last 30 day script on istop dispensed 09/19/24  -on dilaudid pca pump -- demand dosing to 0.5mg (lockout 8mins) with 0.1mg qhourly running  - pain management following, pt may benefit from ms contin as outpatient (states that she is unable to tolerate fent patch)    dvt ppx scd  disposition: still medically active, anticipate 3-4 day LOS

## 2025-01-21 NOTE — PROGRESS NOTE ADULT - SUBJECTIVE AND OBJECTIVE BOX
[facility] Division of Hospital Medicine    SUBJECTIVE / OVERNIGHT EVENTS:    pt seen and examined at bedside  pt pain improving but still 6/10   pt still with blood in stool  tolerating po diet      MEDICATIONS  (STANDING):  acetylcysteine 10%  Inhalation 4 milliLiter(s) Inhalation every 6 hours  aminocaproic acid Tablet 4 Gram(s) Oral every 6 hours  chlorhexidine 2% Cloths 1 Application(s) Topical daily  dextrose 5%. 1000 milliLiter(s) (50 mL/Hr) IV Continuous <Continuous>  dextrose 5%. 1000 milliLiter(s) (100 mL/Hr) IV Continuous <Continuous>  dextrose 50% Injectable 25 Gram(s) IV Push once  dextrose 50% Injectable 12.5 Gram(s) IV Push once  dextrose 50% Injectable 25 Gram(s) IV Push once  glucagon  Injectable 1 milliGRAM(s) IntraMuscular once  hydrocortisone 1% Cream 1 Application(s) Topical daily  hydrocortisone 2.5% Rectal Cream 1 Application(s) Rectal two times a day  HYDROmorphone PCA (1 mG/mL) 30 milliLiter(s) PCA Continuous PCA Continuous  insulin lispro (ADMELOG) corrective regimen sliding scale   SubCutaneous three times a day before meals  levalbuterol Inhalation 1.25 milliGRAM(s) Inhalation every 6 hours  methadone    Tablet 5 milliGRAM(s) Oral <User Schedule>  nafcillin  IVPB 2 Gram(s) IV Intermittent every 4 hours  octreotide  Injectable 100 MICROGram(s) SubCutaneous two times a day  pantoprazole  Injectable 40 milliGRAM(s) IV Push every 12 hours  predniSONE   Tablet 20 milliGRAM(s) Oral daily  pregabalin 200 milliGRAM(s) Oral every 8 hours  sodium chloride 0.9% with potassium chloride 20 mEq/L 1000 milliLiter(s) (100 mL/Hr) IV Continuous <Continuous>    MEDICATIONS  (PRN):  acetaminophen     Tablet .. 650 milliGRAM(s) Oral every 6 hours PRN Temp greater or equal to 38C (100.4F), Mild Pain (1 - 3)  aluminum hydroxide/magnesium hydroxide/simethicone Suspension 30 milliLiter(s) Oral every 4 hours PRN Dyspepsia  benzonatate 100 milliGRAM(s) Oral every 8 hours PRN Cough  dextrose Oral Gel 15 Gram(s) Oral once PRN Blood Glucose LESS THAN 70 milliGRAM(s)/deciliter  guaifenesin/dextromethorphan Oral Liquid 10 milliLiter(s) Oral every 4 hours PRN Cough  HYDROmorphone  Injectable 0.5 milliGRAM(s) IV Push every 6 hours PRN for severe break through pain scale 7-10  melatonin 3 milliGRAM(s) Oral at bedtime PRN Insomnia  naloxone Injectable 0.1 milliGRAM(s) IV Push every 3 minutes PRN For ANY of the following changes in patient status:  A. RR LESS THAN 10 breaths per minute, B. Oxygen saturation LESS THAN 90%, C. Sedation score of 6  ondansetron Injectable 4 milliGRAM(s) IV Push every 8 hours PRN Nausea and/or Vomiting        I&O's Summary    20 Jan 2025 07:01  -  21 Jan 2025 07:00  --------------------------------------------------------  IN: 3736 mL / OUT: 0 mL / NET: 3736 mL    21 Jan 2025 07:01  -  21 Jan 2025 13:27  --------------------------------------------------------  IN: 1150 mL / OUT: 900 mL / NET: 250 mL        PHYSICAL EXAM:  Vital Signs Last 24 Hrs  T(C): 36.4 (21 Jan 2025 08:00), Max: 37 (21 Jan 2025 00:11)  T(F): 97.6 (21 Jan 2025 08:00), Max: 98.6 (21 Jan 2025 00:11)  HR: 81 (21 Jan 2025 12:00) (78 - 104)  BP: 92/60 (21 Jan 2025 12:00) (90/79 - 125/58)  BP(mean): 71 (21 Jan 2025 12:00) (53 - 84)  RR: 17 (21 Jan 2025 12:00) (16 - 21)  SpO2: 100% (21 Jan 2025 12:00) (94% - 100%)    Parameters below as of 21 Jan 2025 12:00  Patient On (Oxygen Delivery Method): nasal cannula  O2 Flow (L/min): 2          CONSTITUTIONAL: NAD, well-groomed  ENMT: Moist oral mucosa, no pharyngeal injection or exudates; normal dentition  RESPIRATORY: Normal respiratory effort; lungs are clear to auscultation bilaterally  CARDIOVASCULAR: Regular rate and rhythm, normal S1 and S2, no murmur/rub/gallop; No lower extremity edema; Peripheral pulses are 2+ bilaterally  ABDOMEN: Nontender to palpation, normoactive bowel sounds, no rebound/guarding; No hepatosplenomegaly  MUSCLOSKELETAL:  Normal gait; no clubbing or cyanosis of digits; no joint swelling or tenderness to palpation  2+ edema in LE  PSYCH: A+O to person, place, and time; affect appropriate  NEUROLOGY: CN 2-12 are intact and symmetric; no gross sensory deficits;   SKIN: No rashes; no palpable lesions    LABS:                        7.5    8.60  )-----------( 156      ( 21 Jan 2025 06:58 )             25.2     01-21    139  |  105  |  4.5[L]  ----------------------------<  132[H]  3.8   |  24.0  |  0.42[L]    Ca    8.0[L]      21 Jan 2025 06:58    TPro  5.1[L]  /  Alb  2.3[L]  /  TBili  0.5  /  DBili  x   /  AST  19  /  ALT  12  /  AlkPhos  140[H]  01-21          Urinalysis Basic - ( 21 Jan 2025 06:58 )    Color: x / Appearance: x / SG: x / pH: x  Gluc: 132 mg/dL / Ketone: x  / Bili: x / Urobili: x   Blood: x / Protein: x / Nitrite: x   Leuk Esterase: x / RBC: x / WBC x   Sq Epi: x / Non Sq Epi: x / Bacteria: x        CAPILLARY BLOOD GLUCOSE      POCT Blood Glucose.: 164 mg/dL (21 Jan 2025 08:47)  POCT Blood Glucose.: 145 mg/dL (20 Jan 2025 22:46)  POCT Blood Glucose.: 83 mg/dL (20 Jan 2025 18:00)

## 2025-01-22 LAB
ALBUMIN SERPL ELPH-MCNC: 2.3 G/DL — LOW (ref 3.3–5.2)
ALP SERPL-CCNC: 120 U/L — SIGNIFICANT CHANGE UP (ref 40–120)
ALT FLD-CCNC: 11 U/L — SIGNIFICANT CHANGE UP
ANION GAP SERPL CALC-SCNC: 11 MMOL/L — SIGNIFICANT CHANGE UP (ref 5–17)
AST SERPL-CCNC: 16 U/L — SIGNIFICANT CHANGE UP
BILIRUB SERPL-MCNC: 0.6 MG/DL — SIGNIFICANT CHANGE UP (ref 0.4–2)
BLD GP AB SCN SERPL QL: SIGNIFICANT CHANGE UP
BUN SERPL-MCNC: 4.9 MG/DL — LOW (ref 8–20)
CALCIUM SERPL-MCNC: 7.9 MG/DL — LOW (ref 8.4–10.5)
CHLORIDE SERPL-SCNC: 107 MMOL/L — SIGNIFICANT CHANGE UP (ref 96–108)
CO2 SERPL-SCNC: 24 MMOL/L — SIGNIFICANT CHANGE UP (ref 22–29)
CREAT SERPL-MCNC: 0.49 MG/DL — LOW (ref 0.5–1.3)
CULTURE RESULTS: SIGNIFICANT CHANGE UP
CULTURE RESULTS: SIGNIFICANT CHANGE UP
EGFR: 124 ML/MIN/1.73M2 — SIGNIFICANT CHANGE UP
GLUCOSE BLDC GLUCOMTR-MCNC: 111 MG/DL — HIGH (ref 70–99)
GLUCOSE BLDC GLUCOMTR-MCNC: 111 MG/DL — HIGH (ref 70–99)
GLUCOSE BLDC GLUCOMTR-MCNC: 145 MG/DL — HIGH (ref 70–99)
GLUCOSE SERPL-MCNC: 117 MG/DL — HIGH (ref 70–99)
HCT VFR BLD CALC: 21.7 % — LOW (ref 34.5–45)
HGB BLD-MCNC: 6.5 G/DL — CRITICAL LOW (ref 11.5–15.5)
MCHC RBC-ENTMCNC: 29.5 PG — SIGNIFICANT CHANGE UP (ref 27–34)
MCHC RBC-ENTMCNC: 30 G/DL — LOW (ref 32–36)
MCV RBC AUTO: 98.6 FL — SIGNIFICANT CHANGE UP (ref 80–100)
PLATELET # BLD AUTO: 133 K/UL — LOW (ref 150–400)
POTASSIUM SERPL-MCNC: 3.5 MMOL/L — SIGNIFICANT CHANGE UP (ref 3.5–5.3)
POTASSIUM SERPL-SCNC: 3.5 MMOL/L — SIGNIFICANT CHANGE UP (ref 3.5–5.3)
PROT SERPL-MCNC: 4.9 G/DL — LOW (ref 6.6–8.7)
RBC # BLD: 2.2 M/UL — LOW (ref 3.8–5.2)
RBC # FLD: 25.4 % — HIGH (ref 10.3–14.5)
SODIUM SERPL-SCNC: 142 MMOL/L — SIGNIFICANT CHANGE UP (ref 135–145)
SPECIMEN SOURCE: SIGNIFICANT CHANGE UP
SPECIMEN SOURCE: SIGNIFICANT CHANGE UP
WBC # BLD: 6.3 K/UL — SIGNIFICANT CHANGE UP (ref 3.8–10.5)
WBC # FLD AUTO: 6.3 K/UL — SIGNIFICANT CHANGE UP (ref 3.8–10.5)

## 2025-01-22 PROCEDURE — 99233 SBSQ HOSP IP/OBS HIGH 50: CPT

## 2025-01-22 PROCEDURE — G0545: CPT

## 2025-01-22 RX ORDER — METHOCARBAMOL 500 MG
500 TABLET ORAL ONCE
Refills: 0 | Status: COMPLETED | OUTPATIENT
Start: 2025-01-22 | End: 2025-01-22

## 2025-01-22 RX ORDER — PREGABALIN CAPSULES, CV 225 MG/1
200 CAPSULE ORAL EVERY 8 HOURS
Refills: 0 | Status: DISCONTINUED | OUTPATIENT
Start: 2025-01-22 | End: 2025-01-29

## 2025-01-22 RX ORDER — BACTERIOSTATIC SODIUM CHLORIDE 0.9 %
500 VIAL (ML) INJECTION ONCE
Refills: 0 | Status: COMPLETED | OUTPATIENT
Start: 2025-01-22 | End: 2025-01-22

## 2025-01-22 RX ORDER — METHADONE HYDROCHLORIDE 5 MG/5ML
10 SOLUTION ORAL AT BEDTIME
Refills: 0 | Status: DISCONTINUED | OUTPATIENT
Start: 2025-01-22 | End: 2025-01-29

## 2025-01-22 RX ORDER — METHADONE HYDROCHLORIDE 5 MG/5ML
10 SOLUTION ORAL ONCE
Refills: 0 | Status: DISCONTINUED | OUTPATIENT
Start: 2025-01-22 | End: 2025-01-22

## 2025-01-22 RX ORDER — ALBUMIN HUMAN 50 G/1000ML
50 SOLUTION INTRAVENOUS
Refills: 0 | Status: COMPLETED | OUTPATIENT
Start: 2025-01-22 | End: 2025-01-22

## 2025-01-22 RX ADMIN — HYDROMORPHONE HYDROCHLORIDE 30 MILLILITER(S): 4 INJECTION, SOLUTION INTRAMUSCULAR; INTRAVENOUS; SUBCUTANEOUS at 19:46

## 2025-01-22 RX ADMIN — HYDROMORPHONE HYDROCHLORIDE 0.5 MILLIGRAM(S): 4 INJECTION, SOLUTION INTRAMUSCULAR; INTRAVENOUS; SUBCUTANEOUS at 21:42

## 2025-01-22 RX ADMIN — HYDROMORPHONE HYDROCHLORIDE 30 MILLILITER(S): 4 INJECTION, SOLUTION INTRAMUSCULAR; INTRAVENOUS; SUBCUTANEOUS at 07:50

## 2025-01-22 RX ADMIN — Medication 4 MILLILITER(S): at 09:23

## 2025-01-22 RX ADMIN — HYDROMORPHONE HYDROCHLORIDE 0.5 MILLIGRAM(S): 4 INJECTION, SOLUTION INTRAMUSCULAR; INTRAVENOUS; SUBCUTANEOUS at 18:01

## 2025-01-22 RX ADMIN — Medication 1.25 MILLIGRAM(S): at 14:27

## 2025-01-22 RX ADMIN — METHADONE HYDROCHLORIDE 5 MILLIGRAM(S): 5 SOLUTION ORAL at 13:25

## 2025-01-22 RX ADMIN — Medication 4 MILLILITER(S): at 21:15

## 2025-01-22 RX ADMIN — HYDROMORPHONE HYDROCHLORIDE 30 MILLILITER(S): 4 INJECTION, SOLUTION INTRAMUSCULAR; INTRAVENOUS; SUBCUTANEOUS at 17:40

## 2025-01-22 RX ADMIN — Medication 1 APPLICATION(S): at 13:24

## 2025-01-22 RX ADMIN — METHADONE HYDROCHLORIDE 10 MILLIGRAM(S): 5 SOLUTION ORAL at 21:42

## 2025-01-22 RX ADMIN — DEXTROSE MONOHYDRATE, SODIUM CHLORIDE, AND POTASSIUM CHLORIDE 100 MILLILITER(S): 50; 2.25; 2.24 INJECTION, SOLUTION INTRAVENOUS at 09:36

## 2025-01-22 RX ADMIN — PANTOPRAZOLE 40 MILLIGRAM(S): 20 TABLET, DELAYED RELEASE ORAL at 05:57

## 2025-01-22 RX ADMIN — OLANZAPINE 2.5 MILLIGRAM(S): 10 TABLET, FILM COATED ORAL at 21:41

## 2025-01-22 RX ADMIN — ACETAMINOPHEN, DIPHENHYDRAMINE HCL, PHENYLEPHRINE HCL 3 MILLIGRAM(S): 325; 25; 5 TABLET ORAL at 21:42

## 2025-01-22 RX ADMIN — NAFCILLIN INJECTION 200 GRAM(S): 2 POWDER, FOR SOLUTION INTRAMUSCULAR; INTRAMUSCULAR; INTRAVENOUS at 18:01

## 2025-01-22 RX ADMIN — OCTREOTIDE ACETATE 100 MICROGRAM(S): 1000 INJECTION INTRAVENOUS; SUBCUTANEOUS at 05:57

## 2025-01-22 RX ADMIN — PREGABALIN CAPSULES, CV 200 MILLIGRAM(S): 225 CAPSULE ORAL at 21:42

## 2025-01-22 RX ADMIN — HYDROMORPHONE HYDROCHLORIDE 0.5 MILLIGRAM(S): 4 INJECTION, SOLUTION INTRAMUSCULAR; INTRAVENOUS; SUBCUTANEOUS at 04:13

## 2025-01-22 RX ADMIN — AMINOCAPROIC ACID 4 GRAM(S): 1000 TABLET ORAL at 18:02

## 2025-01-22 RX ADMIN — Medication 4 MILLILITER(S): at 14:27

## 2025-01-22 RX ADMIN — HYDROMORPHONE HYDROCHLORIDE 0.5 MILLIGRAM(S): 4 INJECTION, SOLUTION INTRAMUSCULAR; INTRAVENOUS; SUBCUTANEOUS at 18:16

## 2025-01-22 RX ADMIN — METHADONE HYDROCHLORIDE 5 MILLIGRAM(S): 5 SOLUTION ORAL at 05:56

## 2025-01-22 RX ADMIN — PREDNISONE 20 MILLIGRAM(S): 5 TABLET ORAL at 05:56

## 2025-01-22 RX ADMIN — Medication 100 MILLIGRAM(S): at 21:42

## 2025-01-22 RX ADMIN — PANTOPRAZOLE 40 MILLIGRAM(S): 20 TABLET, DELAYED RELEASE ORAL at 18:00

## 2025-01-22 RX ADMIN — DEXTROSE MONOHYDRATE, SODIUM CHLORIDE, AND POTASSIUM CHLORIDE 100 MILLILITER(S): 50; 2.25; 2.24 INJECTION, SOLUTION INTRAVENOUS at 04:13

## 2025-01-22 RX ADMIN — NAFCILLIN INJECTION 200 GRAM(S): 2 POWDER, FOR SOLUTION INTRAMUSCULAR; INTRAMUSCULAR; INTRAVENOUS at 09:36

## 2025-01-22 RX ADMIN — NAFCILLIN INJECTION 200 GRAM(S): 2 POWDER, FOR SOLUTION INTRAMUSCULAR; INTRAMUSCULAR; INTRAVENOUS at 05:57

## 2025-01-22 RX ADMIN — HYDROMORPHONE HYDROCHLORIDE 0.5 MILLIGRAM(S): 4 INJECTION, SOLUTION INTRAMUSCULAR; INTRAVENOUS; SUBCUTANEOUS at 05:13

## 2025-01-22 RX ADMIN — ALBUMIN HUMAN 50 MILLILITER(S): 50 SOLUTION INTRAVENOUS at 02:38

## 2025-01-22 RX ADMIN — METHYLPHENIDATE HYDROCHLORIDE 20 MILLIGRAM(S): 36 TABLET, EXTENDED RELEASE ORAL at 11:48

## 2025-01-22 RX ADMIN — DEXTROMETHORPHAN HBR AND GUAIFENESIN ORAL SOLUTION 10 MILLILITER(S): 10; 100 LIQUID ORAL at 12:31

## 2025-01-22 RX ADMIN — NAFCILLIN INJECTION 200 GRAM(S): 2 POWDER, FOR SOLUTION INTRAMUSCULAR; INTRAMUSCULAR; INTRAVENOUS at 02:04

## 2025-01-22 RX ADMIN — OCTREOTIDE ACETATE 100 MICROGRAM(S): 1000 INJECTION INTRAVENOUS; SUBCUTANEOUS at 21:42

## 2025-01-22 RX ADMIN — Medication 80 MILLIGRAM(S): at 21:57

## 2025-01-22 RX ADMIN — NAFCILLIN INJECTION 200 GRAM(S): 2 POWDER, FOR SOLUTION INTRAMUSCULAR; INTRAMUSCULAR; INTRAVENOUS at 13:25

## 2025-01-22 RX ADMIN — DEXTROMETHORPHAN HBR AND GUAIFENESIN ORAL SOLUTION 10 MILLILITER(S): 10; 100 LIQUID ORAL at 21:41

## 2025-01-22 RX ADMIN — NAFCILLIN INJECTION 200 GRAM(S): 2 POWDER, FOR SOLUTION INTRAMUSCULAR; INTRAMUSCULAR; INTRAVENOUS at 21:57

## 2025-01-22 RX ADMIN — ANTISEPTIC SURGICAL SCRUB 1 APPLICATION(S): 0.04 SOLUTION TOPICAL at 13:27

## 2025-01-22 RX ADMIN — AMINOCAPROIC ACID 4 GRAM(S): 1000 TABLET ORAL at 21:57

## 2025-01-22 RX ADMIN — Medication 1 APPLICATION(S): at 18:01

## 2025-01-22 RX ADMIN — HYDROMORPHONE HYDROCHLORIDE 0.5 MILLIGRAM(S): 4 INJECTION, SOLUTION INTRAMUSCULAR; INTRAVENOUS; SUBCUTANEOUS at 09:36

## 2025-01-22 RX ADMIN — AMINOCAPROIC ACID 4 GRAM(S): 1000 TABLET ORAL at 05:56

## 2025-01-22 RX ADMIN — Medication 1.25 MILLIGRAM(S): at 09:22

## 2025-01-22 RX ADMIN — ALBUMIN HUMAN 50 MILLILITER(S): 50 SOLUTION INTRAVENOUS at 03:41

## 2025-01-22 RX ADMIN — AMINOCAPROIC ACID 4 GRAM(S): 1000 TABLET ORAL at 13:24

## 2025-01-22 RX ADMIN — HYDROMORPHONE HYDROCHLORIDE 0.5 MILLIGRAM(S): 4 INJECTION, SOLUTION INTRAMUSCULAR; INTRAVENOUS; SUBCUTANEOUS at 10:00

## 2025-01-22 RX ADMIN — Medication 1.25 MILLIGRAM(S): at 21:15

## 2025-01-22 RX ADMIN — Medication 1000 MILLILITER(S): at 02:36

## 2025-01-22 RX ADMIN — HYDROMORPHONE HYDROCHLORIDE 0.5 MILLIGRAM(S): 4 INJECTION, SOLUTION INTRAMUSCULAR; INTRAVENOUS; SUBCUTANEOUS at 23:22

## 2025-01-22 RX ADMIN — Medication 1 APPLICATION(S): at 05:58

## 2025-01-22 RX ADMIN — DEXTROSE MONOHYDRATE, SODIUM CHLORIDE, AND POTASSIUM CHLORIDE 100 MILLILITER(S): 50; 2.25; 2.24 INJECTION, SOLUTION INTRAVENOUS at 18:00

## 2025-01-22 RX ADMIN — MEMANTINE HYDROCHLORIDE 10 MILLIGRAM(S): 7 CAPSULE, EXTENDED RELEASE ORAL at 09:36

## 2025-01-22 RX ADMIN — MEMANTINE HYDROCHLORIDE 5 MILLIGRAM(S): 7 CAPSULE, EXTENDED RELEASE ORAL at 21:41

## 2025-01-22 NOTE — CHART NOTE - NSCHARTNOTEFT_GEN_A_CORE
Pt seen and examined at bedside for c/o b/l LE edema  States this is not new, have been swollen past few days but getting worse  Denies chest pain, SOB, n/v/d/c, abdominal pain, HA, dizziness, blurry vision  All vital signs stable  On exam b/l LEs with 3+ pitting edema, extremities warm, normal color/temperature. +Pulses.   Ultrasound 1/21 negative for DVT  Recommend elevation, compresses  Offered lasix, pt declined

## 2025-01-22 NOTE — PROGRESS NOTE ADULT - ASSESSMENT
39y  Female with h/o metastatic breast cancer ER/[place] Neg, HER-2 + on chemotherapy (last dose 12/26/24) (mets to lung, liver, spleen, spine, bone and brain), gastritis w/ intermittent episodes of dark stool (follows w/ Dr. Rosado GI and is on PPI and octreotide daily). Patient presented 1/5 with c/o worsening SOB.  She was seen in ED 1/2/25 for for weakness and SOB at which time she was found to have Hb of 5 requiring PRBC transfusion and positive for Flu A and started on tamiflu and was discharged from the ED on 1/3/25.  Her respiratory status has worsened since then w/ productive cough associated with body aches and chills. Admitted for sepsis ,acute hypoxic respiratory failure with flu A. s/p HFNC for worsening respiratory status. S/P course of Tamiflu. Vancomycin and Zosyn was added for possible superimposed bacterial pna. Blood cultures with MSSA.  Cardiology consultation requested for evaluation of endocarditis. Blood cultures 1/5, 1/7, 1/9 reporting MSSA , Repeat blood cultures ngtd on 1/11/25, Sputum Cx 1/6 Staphylococcus aureus. MARYANN hold for active gib requring transfusion. Pt had large BM likley diverticular bleed. Had recent scopy. Gi following. ct abd/pelvis w/iv contrast no active bleed (1/13). Urine Cx 1/5 reporting 10k - 49k Escherichia coli  of ? significance  since UA not concerning for UTI.  RVP/COVID 19 PCR + Influenza A.GI PCR  positive norovirus,Giardia.    #Nororvirus,Giadia gastroenteritis   -GI PCR  positive norovirus,Giadia  -Albendazole per ID (dc on 01/22 -- completed 5 day course)  -ID on board, recs noted  -fluids as needed, encourage po hydration  -bm less bloody, improving    #Acute blood loss anemia   #GIB  -s/p multiple transfusions during this hospital stay  -goal hgb of 7   -CTA abd/pelvis  no active bleed   -PPI/OCTRIO  -GI eval with no plan for scopes 2/2 recently done.  Still with bleeding.  reed GI history, ? GAVE disease per GI notes Has had multiple EGD/colonoscopies in past as well.   -spoke to GI 01/21: multiple scopes done - no sources of bleeding identified -- no plan for any endoscopy in the near future // as per GI pt will likely be transfusion dependent   -hem/onc following --- will need to discuss outpatient transfusion set up   -hem/onc started Amicar on1/16  -bm with less blood       #Thrombocytopenia  -likely from chemo/sepsis  -will monitor cbc        #Sepsis 2/2 Flu A w/ superimposed multifocal PNA  #Acute hypoxic respiratory failure 2/2 Flu A w/ superimposed multifocal PNA   - on NC, comfortable  - persistently positive RVP for flu A  - s/p  tamiflu   - repeat CT 1/8 shows improved pleural effusion but worsening GGOs, unclear if infectious or malignant etiology  - repeat CT 1/9 with similar findings  - repeat CT next week to evaluate for improvement of infiltrates  - po steroid tapering dose  - c/w abx per ID recs  - sputum clx growing moderate staph aureus   - TTE EF 65-70%, no WMA    #MSSA bacteremia  - blood clx positive 1/5, repeat bl c/s (1/11) ngtd  - ID following  - c/w nafcillin  per id (if pt unable to have MARYANN will need 6 week course of nafcillin from chemoport removal)  - MARYANN on hold for gib. f/u cardio rec  -f/u ID REC        #Chronic normocytic anemia 2/2 metastatic breast cancer on chemo and 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   #Thrombocytopenia likely 2/2 sepsis   #Breast cancer ER/[place] Neg, HER-2 pos on chemotherapy w/ mets to lung, liver, spleen, spine, bone and brain  - Baseline Hb ranges 8-9    - s/p prbc transfusion on 1/2, 1/8 and 1/9,1/11,1/13,1/14  - trend CBC, transfuse for hgb <7  - c/w home sub q octreotide and IV protonix  - Monitor CBC and transfuse for Hb<7 and plts<20K in setting of sepsis   - [facility] following  - monitor on tele and     #Breast cancer ER/[place] Neg, HER-2 pos on chemotherapy w/ mets to lung, liver, spleen, spine, bone and brain  - c/w pregabalin, methylphenidate (confirmed on ISTOP Reference #: [id number])  - Reports being on methadone for pain but last 30 day script on istop dispensed 09/19/24  -on dilaudid pca pump -- demand dosing to 0.5mg (lockout 8mins) with 0.1mg qhourly running  - pain management following, pt may benefit from ms contin as outpatient (states that she is unable to tolerate fent patch)    dvt ppx scd  disposition: still medically active, need to wean off dilaudid pca and establish oral pain regimen. need to establish outpatient blood transfusions

## 2025-01-22 NOTE — PROGRESS NOTE ADULT - ASSESSMENT
39y  Female with h/o metastatic breast cancer ER/[place] Neg, HER-2 + on chemotherapy (last dose 12/26/24) (mets to lung, liver, spleen, spine, bone and brain), gastritis w/ intermittent episodes of dark stool (follows w/ Dr. Rosado GI and is on PPI and octreotide daily). Patient presented 1/5 with c/o worsening SOB.  She was seen in ED 1/2/25 for for weakness and SOB at which time she was found to have Hb of 5 requiring PRBC transfusion and positive for Flu A and started on tamiflu and was discharged from the ED on 1/3/25.  Her respiratory status has worsened since then w/ productive cough associated with body aches and chills.  Denies sick contacts but has had many frequent hospital visits. Patient has been afebrile, no leukocytosis. Was placed on BIPAP for Worsening respiratory status. continued on Tamiflu. Vancomycn and Zosyn was added. Blood cultures with MSSA. ID input requested.       MSSA bacteremia   Staphylococcus aureus PNA   Influenza A   metastatic breast cancer ER/[place] Neg, HER-2 + on chemotherapy   Port in place s/p explant 1/10/25  Stool PCR + Norovirus and Giardia       - Blood cultures 1/5, 1/7, 1/9 reporting MSSA    - Repeat blood cultures 1/10,  1/11/25, 1/17/25 no growth   - Sputum Cx 1/6 Staphylococcus aureus  - Urine Cx 1/5 reporting 10k - 49k Escherichia coli  of ? significance since UA not concerning for UTI   - Stool PCR + Norovirus and Giardia   - RVP/COVID 19 PCR + Influenza A  - S/P Port removal 1/10/25  - CTA Chest reporting No acute pulmonary embolism. Wide spread patchy airspace opacities bilaterally, increased since the prior exam.  - US RUQ reporting No evidence of acute cholecystitis or biliary ductal dilatation.  - Procalcitonin level 2.32 --> 1.37, ordered for the AM   - TTE 1/6 with no veg  - Called cardiology consult for MARYANN if possible   - GI eval noted, no plan for EGD   - s/p Port removal 1/10/25  - Patient still with dropping H/H, receiving multiple transfusion weekly  - No plan for MARYANN if no EGD is done  - If no MARYANN done will need 6 weeks of antibiotics from port explant   - Completed oseltamivir  1/10/25  - Continue Nafcillin 2gm IV [id number]  - For Norovirus, continue hydration and supportive care  - For Giardia, Completed 5 days of Albendazole   - Hold off on PICC/Midline for now unless needed for reasons other than infectious diseases  - Follow up cultures  - Trend Fever  - Trend WBC      Thank you for allowing me to participate in the care of your patient.   Will Follow    Discussed treatment plan with:  Clinical pharmacy, Dr Kennedy

## 2025-01-22 NOTE — PROGRESS NOTE ADULT - ASSESSMENT
39y  Female with h/o metastatic breast cancer ER/[place] Neg, HER-2 + on chemotherapy (last dose 12/26/24) (mets to lung, liver, spleen, spine, bone and brain), gastritis w/ intermittent episodes of dark stool (follows w/ Dr. Rosado GI and is on PPI and octreotide daily). Patient presented 1/5 with c/o worsening SOB.  She was seen in ED 1/2/25 for for weakness and SOB at which time she was found to have Hb of 5 requiring PRBC transfusion and positive for Flu A and started on tamiflu and was discharged from the ED on 1/3/25.  Her respiratory status has worsened since then w/ productive cough associated with body aches and chills.  Denies sick contacts but has had many frequent hospital visits. Patient has been afebrile, no leukocytosis. Was placed on BIPAP for Worsening respiratory status. continued on Tamiflu. Vancomycn and Zosyn was added. Blood cultures with MSSA.     Metastatic breast cancer   - last dose of trastuzumab was on 12/26/24.  -All treatment on hold at the moment.  -Follow up with primary oncologist, Dr. Chapa after discharge    Cytopenias   - secondary to malignancy and acute illness and now GIB  - S/P multiple transfusions  - GI evaluated for large bloody bowel movement  Suspect rectal bleeding is due to diverticular bleed which are self-limiting - If patient becomes hemodynamically unstable, requiring multiple transfusions,   - CT A/P No evidence of active intraluminal extravasation of contrast.   - GI f/u noted.  no plans for scope.  Previously scoped in Nov. 2024. internal hemorrhoids, gastric erosions, no active bleeding site   - c/w home sub q octreotide and IV protonix  - Continue Amicar 4 grams Q 6 H for bleeding- will titrate according to HGB, given drop I will keep on her on the same dose.  - Hgb 6.7 today, getting transfused.  -Transfuse if hgb<7.0.      Respiratory failure   - multifocal pneumonia and flu+  - S/P oseltamivir    - Management as per primary team.  - TTE 1/6 with no veg  - Cardiology following- . No MARYANN at this time until stabilizes and source of bleed ascertained  - feeling better    MSSA Bacteremia  - cultures persistently positive. pt afebrile.   --  ID following -On nafcillin   -- S/P Port removal 1/10/25-  PICC or midline prior to d/c for outpt chemo   She is concerned about access. Will place once closer to discharge.  + Noro virus and giardia lamblia  - ID following For Giardia, confirm the Giardia stool Ag  - s/p Albendazole 400mg PO q 24hours x 5 days for Giardia     Cont aggressive pain management. Remains on PCA pump.    Will follow

## 2025-01-22 NOTE — PROGRESS NOTE ADULT - SUBJECTIVE AND OBJECTIVE BOX
39y  Female with h/o metastatic breast cancer ER/PA Neg, HER-2 + on chemotherapy (last dose 12/26/24) (mets to lung, liver, spleen, spine, bone and brain), gastritis w/ intermittent episodes of dark stool (follows w/ Dr. Rosado GI and is on PPI and octreotide daily). Patient presented 1/5 with c/o worsening SOB.  She was seen in ED 1/2/25 for for weakness and SOB at which time she was found to have Hb of 5 requiring PRBC transfusion and positive for Flu A and started on tamiflu and was discharged from the ED on 1/3/25.  Her respiratory status has worsened since then w/ productive cough associated with body aches and chills.  Denies sick contacts but has had many frequent hospital visits. Patient has been afebrile, no leukocytosis. Was placed on BIPAP for Worsening respiratory status. continued on Tamiflu. Vancomycn and Zosyn was added. Blood cultures with MSSA.     - 1/22/2025: She says that he pain is somewhat controlled with her current regimen. She feels that dilaudid works reasonably well and that she is unable to tolerate fentanyl. Otherwise, breathing is ok and no n/v/d. She says that she has noted that her legs are somewhat swollen but doppler studies were negative for dvt. She has not noted any bleeding from any site. She is concerned though that she will need some form of venous access once she is discharged since her veins are so bad and she is going to need labs, fluids, iron, treatments in general to be given as an outpatient.      PAST MEDICAL & SURGICAL HISTORY:  H/O compression fracture of spine  Anxiety  Metastatic breast cancer  H/O pleural effusion  Pericardial effusion  S/P tonsillectomy  H/O chest tube placement  12/23/21  S/P pericardiocentesis  12/28/21    Allergies  pertuzumab (Other (Severe))  Perjeta (Other (Severe))    MEDICATIONS  (STANDING):  acetylcysteine 10%  Inhalation 4 milliLiter(s) Inhalation every 6 hours  aminocaproic acid Tablet 4 Gram(s) Oral every 6 hours  chlorhexidine 2% Cloths 1 Application(s) Topical daily  dextrose 5%. 1000 milliLiter(s) (50 mL/Hr) IV Continuous <Continuous>  dextrose 5%. 1000 milliLiter(s) (100 mL/Hr) IV Continuous <Continuous>  dextrose 50% Injectable 25 Gram(s) IV Push once  dextrose 50% Injectable 12.5 Gram(s) IV Push once  dextrose 50% Injectable 25 Gram(s) IV Push once  FLUoxetine Solution 80 milliGRAM(s) Oral at bedtime  glucagon  Injectable 1 milliGRAM(s) IntraMuscular once  hydrocortisone 1% Cream 1 Application(s) Topical daily  hydrocortisone 2.5% Rectal Cream 1 Application(s) Rectal two times a day  HYDROmorphone PCA (1 mG/mL) 30 milliLiter(s) PCA Continuous PCA Continuous  insulin lispro (ADMELOG) corrective regimen sliding scale   SubCutaneous three times a day before meals  levalbuterol Inhalation 1.25 milliGRAM(s) Inhalation every 6 hours  memantine 5 milliGRAM(s) Oral at bedtime  memantine 10 milliGRAM(s) Oral with breakfast  methadone    Tablet 5 milliGRAM(s) Oral <User Schedule>  methylphenidate 20 milliGRAM(s) Oral <User Schedule>  nafcillin  IVPB 2 Gram(s) IV Intermittent every 4 hours  octreotide  Injectable 100 MICROGram(s) SubCutaneous two times a day  OLANZapine 2.5 milliGRAM(s) Oral at bedtime  pantoprazole  Injectable 40 milliGRAM(s) IV Push every 12 hours  predniSONE   Tablet 20 milliGRAM(s) Oral daily  sodium chloride 0.9% with potassium chloride 20 mEq/L 1000 milliLiter(s) (100 mL/Hr) IV Continuous <Continuous>    MEDICATIONS  (PRN):  acetaminophen     Tablet .. 650 milliGRAM(s) Oral every 6 hours PRN Temp greater or equal to 38C (100.4F), Mild Pain (1 - 3)  aluminum hydroxide/magnesium hydroxide/simethicone Suspension 30 milliLiter(s) Oral every 4 hours PRN Dyspepsia  benzonatate 100 milliGRAM(s) Oral every 8 hours PRN Cough  dextrose Oral Gel 15 Gram(s) Oral once PRN Blood Glucose LESS THAN 70 milliGRAM(s)/deciliter  guaifenesin/dextromethorphan Oral Liquid 10 milliLiter(s) Oral every 4 hours PRN Cough  HYDROmorphone  Injectable 0.5 milliGRAM(s) IV Push every 3 hours PRN Severe Pain (7 - 10)  melatonin 3 milliGRAM(s) Oral at bedtime PRN Insomnia  naloxone Injectable 0.1 milliGRAM(s) IV Push every 3 minutes PRN For ANY of the following changes in patient status:  A. RR LESS THAN 10 breaths per minute, B. Oxygen saturation LESS THAN 90%, C. Sedation score of 6  ondansetron Injectable 4 milliGRAM(s) IV Push every 8 hours PRN Nausea and/or Vomiting    Vital Signs Last 24 Hrs  T(C): 36.8 (22 Jan 2025 13:08), Max: 37.4 (21 Jan 2025 16:11)  T(F): 98.3 (22 Jan 2025 13:08), Max: 99.3 (21 Jan 2025 16:11)  HR: 90 (22 Jan 2025 10:25) (78 - 103)  BP: 101/58 (22 Jan 2025 08:00) (87/51 - 114/64)  BP(mean): 70 (22 Jan 2025 08:00) (58 - 96)  RR: 16 (22 Jan 2025 08:00) (16 - 18)  SpO2: 96% (22 Jan 2025 10:25) (95% - 100%)    Parameters below as of 22 Jan 2025 10:25  Patient On (Oxygen Delivery Method): room air    PE:  NAD  On O2 NC  bilateral rales  abd soft, nd, nt  a/o x 3      CBC                          7.5    8.60  )-----------( 156      ( 21 Jan 2025 06:58 )             25.2                             8.1    9.59  )-----------( 158      ( 20 Jan 2025 07:25 )             27.1                             8.3    8.96  )-----------( 156      ( 19 Jan 2025 07:18 )             27.8                           8.6    9.26  )-----------( 136      ( 18 Jan 2025 04:55 )             27.8                             6.4    9.75  )-----------( 130      ( 17 Jan 2025 06:45 )             20.7         Chem:       01-21    139  |  105  |  4.5[L]  ----------------------------<  132[H]  3.8   |  24.0  |  0.42[L]    Ca    8.0[L]      21 Jan 2025 06:58    TPro  5.1[L]  /  Alb  2.3[L]  /  TBili  0.5  /  DBili  x   /  AST  19  /  ALT  12  /  AlkPhos  140[H]  01-21 01-20    138  |  103  |  4.9[L]  ----------------------------<  167[H]  3.9   |  25.0  |  0.51    Ca    8.0[L]      20 Jan 2025 07:25    TPro  5.5[L]  /  Alb  2.3[L]  /  TBili  0.6  /  DBili  x   /  AST  19  /  ALT  13  /  AlkPhos  165[H]  01-20 01-17    139  |  104  |  8.7  ----------------------------<  163[H]  2.9[LL]   |  26.0  |  0.70    Ca    7.3[L]      17 Jan 2025 06:45  Phos  4.2     01-17  Mg     1.5     01-17    TPro  5.0[L]  /  Alb  2.1[L]  /  TBili  0.8  /  DBili  0.3  /  AST  17  /  ALT  13  /  AlkPhos  127[H]  01-16      01-15    138  |  102  |  12.3  ----------------------------<  135[H]  3.6   |  25.0  |  0.67    Ca    8.1[L]      15 Cristhian 2025 06:10      01-14    141  |  103  |  13.4  ----------------------------<  127[H]  3.4[L]   |  30.0[H]  |  0.54    Ca    8.1[L]      14 Jan 2025 04:48        01-13    141  |  103  |  20.3[H]  ----------------------------<  137[H]  3.8   |  27.0  |  0.47[L]    Ca    8.2[L]      13 Jan 2025 06:10

## 2025-01-22 NOTE — CHART NOTE - NSCHARTNOTEFT_GEN_A_CORE
Nutrition Note:     Source: Patient [x ]  Family [ ]   other [ x] chart, staff/IDT rounds     Current Diet:   Diet, Regular (01-09-25 @ 11:18)    Patient reports [ ] nausea  [ ] vomiting [ ] diarrhea [ ] constipation  [ ]chewing problems [ ] swallowing issues  [ ] other: denies     PO intake:  < 50% [ ]   50-75%  [ ]   %  [x ]  other :    Source for PO intake [x ] Patient [ ] family [ ] chart [ ] staff [ ] other    Current Weight:   (1/22) 82.5kg RD bed scale   (1/13) 63.3kg RD bed scale   (1/7) 67.7 kg  2+ mild to L and R leg     % Weight Change: Unclear accuracy of weight 2/2 inconsistency ; fluid shifts noted also may be influencing weight trends    Pertinent Medications:   MEDICATIONS  (STANDING):  dextrose 5%. 1000 milliLiter(s) (50 mL/Hr) IV Continuous <Continuous>  insulin lispro (ADMELOG) corrective regimen sliding scale   SubCutaneous three times a day before meals  predniSONE   Tablet 20 milliGRAM(s) Oral daily  sodium chloride 0.9% with potassium chloride 20 mEq/L 1000 milliLiter(s) (100 mL/Hr) IV Continuous <Continuous>    Pertinent Labs: 01-22 Na142 mmol/L Glu 117 mg/dL[H] K+ 3.5 mmol/L Cr  0.49 mg/dL[L] BUN 4.9 mg/dL[L] Phos n/a   Alb 2.3 g/dL[L] PAB n/a       Skin:     Nutrition focused physical exam conducted - found signs of malnutrition [ ]absent [ ]present    Subcutaneous fat loss: [ ] Orbital fat pads region, [ ]Buccal fat region, [ ]Triceps region,  [ ]Ribs region    Muscle wasting: [ ]Temples region, [ ]Clavicle region, [ ]Shoulder region, [ ]Scapula region, [ ]Interosseous region,  [ ]thigh region, [ ]Calf region    Estimated Needs:   [x ] no change since previous assessment  [ ] recalculated:     Current Nutrition Diagnosis: Increased Nutrient Needs... (protein) related to increased physiological demand for healing as evidenced by hx met breast cancer (mets to lung, liver, spleen, spine, bone, brain), sepsis    39y  Female with h/o metastatic breast cancer ER/ID Neg, HER-2 + on chemotherapy (last dose 12/26/24) (mets to lung, liver, spleen, spine, bone and brain), gastritis w/ intermittent episodes of dark stool (follows w/ Dr. Rosado GI and is on PPI and octreotide daily). presented 1/5 with c/o worsening SOB, seen in ED for weakness and SOB found to have Hb of 5 requiring PRBC transfusion and positive for Flu A. Admitted for sepsis ,acute hypoxic respiratory failure with flu A. Currently on HFNC for worsening respiratory status. Blood cultures with MSSA. Blood cultures reporting MSSA. As per MD note, Pt had large BM today 1/13 likely diverticular bleed. GI following. RVP/COVID 19 PCR + Influenza A. repeat CT 1/8 shows improved pleural effusion but worsening GGOs, unclear if infectious or malignant etiology, repeat CT 1/9 with similar findings. Her respiratory status has worsened w/ productive cough associated with body aches and chills.  Was placed on BIPAP for Worsening respiratory status. Blood cultures with MSSA. ID input requested.     Spoke with pt this AM. Reported good po intake in house, improved. Last BM documented 1/21, diarrhea improved. Will add ensure BID for additional protein/calories, prefers chocolate. RD to remain available.     Recommendations:   - Monitor weights daily for trend/accuracy  - Continue diet as tolerated.   - Rx MVI daily, vit C 500mg    - add ensure BID - chocolate     Monitoring and Evaluation:   [ x] PO intake [ x] Tolerance to diet prescription [X] Weights  [X] Follow up per protocol [X] Labs: chem 8, mg, phos, H/H

## 2025-01-22 NOTE — PROGRESS NOTE ADULT - SUBJECTIVE AND OBJECTIVE BOX
[facility]  INFECTIOUS DISEASES at [place] / [place] / [place]  =======================================================                              Salazar Toro MD                              Professor Emeritus:  Dr Warner Mcintosh MD            Diplomates American Board of Internal Medicine & Infectious Diseases                                   Tel  [phone] Fax [phone]                                  Hospital Consult line:  [phone]  =======================================================      [place] [id number]    Follow up: MSSA bacteremia    No fevers   Diarrhea improved       Allergies:  pertuzumab (Other (Severe))  Perjeta (Other (Severe))        REVIEW OF SYSTEMS:  CONSTITUTIONAL:  No Fever or chills  HEENT:   No diplopia or blurred vision.  No earache, sore throat or runny nose.  CARDIOVASCULAR:  No Chest Pain  RESPIRATORY:  No cough, shortness of breath  GASTROINTESTINAL:  No nausea, vomiting + diarrhea.  GENITOURINARY:  No dysuria, frequency or urgency. No Blood in urine  MUSCULOSKELETAL:  no joint aches, no muscle pain  SKIN:  No change in skin, hair or nails.  NEUROLOGIC:  No Headaches      Physical Exam:  GEN: NAD  HEENT: normocephalic and atraumatic.    NECK: Supple.   LUNGS: CTA B/L.  HEART: RRR  ABDOMEN: Soft, NT, ND.  +BS.    : No CVA tenderness  EXTREMITIES: Without  edema.  MSK: No joint swelling  NEUROLOGIC: No Focal Deficits   SKIN: No rash        Vitals:  T(F): 98.2 (22 Jan 2025 04:11), Max: 99.3 (21 Jan 2025 16:11)  HR: 80 (22 Jan 2025 06:00)  BP: 104/90 (22 Jan 2025 06:00)  RR: 18 (22 Jan 2025 06:00)  SpO2: 100% (22 Jan 2025 06:00) (95% - 100%)  temp max in last 48H T(F): , Max: 99.3 (01-21-25 @ 16:11)    Current Antibiotics:  nafcillin  IVPB 2 Gram(s) IV Intermittent every 4 hours    Other medications:  acetylcysteine 10%  Inhalation 4 milliLiter(s) Inhalation every 6 hours  aminocaproic acid Tablet 4 Gram(s) Oral every 6 hours  chlorhexidine 2% Cloths 1 Application(s) Topical daily  dextrose 5%. 1000 milliLiter(s) IV Continuous <Continuous>  dextrose 5%. 1000 milliLiter(s) IV Continuous <Continuous>  dextrose 50% Injectable 25 Gram(s) IV Push once  dextrose 50% Injectable 12.5 Gram(s) IV Push once  dextrose 50% Injectable 25 Gram(s) IV Push once  FLUoxetine Solution 80 milliGRAM(s) Oral at bedtime  glucagon  Injectable 1 milliGRAM(s) IntraMuscular once  hydrocortisone 1% Cream 1 Application(s) Topical daily  hydrocortisone 2.5% Rectal Cream 1 Application(s) Rectal two times a day  HYDROmorphone PCA (1 mG/mL) 30 milliLiter(s) PCA Continuous PCA Continuous  insulin lispro (ADMELOG) corrective regimen sliding scale   SubCutaneous three times a day before meals  levalbuterol Inhalation 1.25 milliGRAM(s) Inhalation every 6 hours  memantine 5 milliGRAM(s) Oral at bedtime  memantine 10 milliGRAM(s) Oral with breakfast  methadone    Tablet 5 milliGRAM(s) Oral <User Schedule>  methylphenidate 20 milliGRAM(s) Oral <User Schedule>  octreotide  Injectable 100 MICROGram(s) SubCutaneous two times a day  OLANZapine 2.5 milliGRAM(s) Oral at bedtime  pantoprazole  Injectable 40 milliGRAM(s) IV Push every 12 hours  predniSONE   Tablet 20 milliGRAM(s) Oral daily  sodium chloride 0.9% with potassium chloride 20 mEq/L 1000 milliLiter(s) IV Continuous <Continuous>                            6.5    6.30  )-----------( 133      ( 22 Jan 2025 07:38 )             21.7     01-22    142  |  107  |  4.9[L]  ----------------------------<  117[H]  3.5   |  24.0  |  0.49[L]    Ca    7.9[L]      22 Jan 2025 07:38    TPro  4.9[L]  /  Alb  2.3[L]  /  TBili  0.6  /  DBili  x   /  AST  16  /  ALT  11  /  AlkPhos  120  01-22    RECENT CULTURES:  01-17 @ 13:02 .Blood BLOOD     No growth at 4 days    01-17 @ 12:58 .Blood BLOOD     No growth at 4 days    01-11 @ 05:25 .Blood BLOOD     No growth at 5 days    01-11 @ 05:20 .Blood BLOOD     No growth at 5 days    01-10 @ 18:20 .Surgical Swab     No growth at 5 days    01-10 @ 12:20 .Blood BLOOD     No growth at 5 days    01-09 @ 04:57 .Blood BLOOD Staphylococcus aureus    Growth in aerobic bottle: Staphylococcus aureus  Growth in aerobic bottle: Gram Positive Cocci in Clusters      WBC Count: 6.30 K/uL (01-22-25 @ 07:38)  WBC Count: 8.60 K/uL (01-21-25 @ 06:58)  WBC Count: 9.59 K/uL (01-20-25 @ 07:25)  WBC Count: 9.09 K/uL (01-20-25 @ 00:44)  WBC Count: 8.96 K/uL (01-19-25 @ 07:18)  WBC Count: 9.26 K/uL (01-18-25 @ 04:55)  WBC Count: 10.49 K/uL (01-17-25 @ 14:38)    Creatinine: 0.49 mg/dL (01-22-25 @ 07:38)  Creatinine: 0.42 mg/dL (01-21-25 @ 06:58)  Creatinine: 0.51 mg/dL (01-20-25 @ 07:25)  Creatinine: 0.42 mg/dL (01-19-25 @ 07:18)  Creatinine: 0.50 mg/dL (01-18-25 @ 04:55)    Procalcitonin: 0.15 ng/mL (01-21-25 @ 06:58)     SARS-CoV-2: NotDetec (01-05-25 @ 10:54)  SARS-CoV-2 Result: NotDetec (01-02-25 @ 19:20)      Influenza AH3 (RapRVP): Detected (01.05.25 @ 10:54)    Urinalysis + Microscopic Examination (01.06.25 @ 03:00)    pH Urine: 6.0   Urine Appearance: Clear   Color: Yellow   Specific Gravity: 1.015   Protein, Urine: Trace mg/dL   Glucose Qualitative, Urine: Negative mg/dL   Ketone - Urine: Negative mg/dL   Blood, Urine: Small   Bilirubin: Negative   Urobilinogen: 1.0 mg/dL   Leukocyte Esterase Concentration: Small   Nitrite: Negative   White Blood Cell - Urine: 6 /HPF   Red Blood Cell - Urine: 5 /HPF   Bacteria: Occasional /HPF   Epithelial Cells: 2 /HPF      GI PCR Panel Stool (01.15.25 @ 06:15)    GI PCR Panel: Detected   Giardia lamblia: Detected   Norovirus GI/GII: Detected        < from: TTE Limited W or WO Ultrasound Enhancing Agent (01.06.25 @ 12:55) >  CONCLUSIONS:      1. Technically difficult image quality.   2. Left ventricular systolic function is normal with an ejection fraction visually estimated at 65 to 70 %.   3. Normal right ventricular cavity size, with normal wall thickness, and normal right ventricular systolic function.   4. Compared to the transthoracic echocardiogram performed on 7/21/2024, there have been no significant interval changes.    < end of copied text >

## 2025-01-22 NOTE — PROGRESS NOTE ADULT - SUBJECTIVE AND OBJECTIVE BOX
[facility] Division of Hospital Medicine    SUBJECTIVE / OVERNIGHT EVENTS:    pt seen and examined at bedside  pt pain slightly improved - pt feels that she is not yet ready to switch over to oral dilaudid dosing  last bowel movement dark in color, no blood  hgb this am <7 -- patient ok with blood transfusion    MEDICATIONS  (STANDING):  acetylcysteine 10%  Inhalation 4 milliLiter(s) Inhalation every 6 hours  aminocaproic acid Tablet 4 Gram(s) Oral every 6 hours  chlorhexidine 2% Cloths 1 Application(s) Topical daily  dextrose 5%. 1000 milliLiter(s) (50 mL/Hr) IV Continuous <Continuous>  dextrose 5%. 1000 milliLiter(s) (100 mL/Hr) IV Continuous <Continuous>  dextrose 50% Injectable 25 Gram(s) IV Push once  dextrose 50% Injectable 12.5 Gram(s) IV Push once  dextrose 50% Injectable 25 Gram(s) IV Push once  FLUoxetine Solution 80 milliGRAM(s) Oral at bedtime  glucagon  Injectable 1 milliGRAM(s) IntraMuscular once  hydrocortisone 1% Cream 1 Application(s) Topical daily  hydrocortisone 2.5% Rectal Cream 1 Application(s) Rectal two times a day  HYDROmorphone PCA (1 mG/mL) 30 milliLiter(s) PCA Continuous PCA Continuous  insulin lispro (ADMELOG) corrective regimen sliding scale   SubCutaneous three times a day before meals  levalbuterol Inhalation 1.25 milliGRAM(s) Inhalation every 6 hours  memantine 5 milliGRAM(s) Oral at bedtime  memantine 10 milliGRAM(s) Oral with breakfast  methadone    Tablet 5 milliGRAM(s) Oral <User Schedule>  methylphenidate 20 milliGRAM(s) Oral <User Schedule>  nafcillin  IVPB 2 Gram(s) IV Intermittent every 4 hours  octreotide  Injectable 100 MICROGram(s) SubCutaneous two times a day  OLANZapine 2.5 milliGRAM(s) Oral at bedtime  pantoprazole  Injectable 40 milliGRAM(s) IV Push every 12 hours  predniSONE   Tablet 20 milliGRAM(s) Oral daily  sodium chloride 0.9% with potassium chloride 20 mEq/L 1000 milliLiter(s) (100 mL/Hr) IV Continuous <Continuous>    MEDICATIONS  (PRN):  acetaminophen     Tablet .. 650 milliGRAM(s) Oral every 6 hours PRN Temp greater or equal to 38C (100.4F), Mild Pain (1 - 3)  aluminum hydroxide/magnesium hydroxide/simethicone Suspension 30 milliLiter(s) Oral every 4 hours PRN Dyspepsia  benzonatate 100 milliGRAM(s) Oral every 8 hours PRN Cough  dextrose Oral Gel 15 Gram(s) Oral once PRN Blood Glucose LESS THAN 70 milliGRAM(s)/deciliter  guaifenesin/dextromethorphan Oral Liquid 10 milliLiter(s) Oral every 4 hours PRN Cough  HYDROmorphone  Injectable 0.5 milliGRAM(s) IV Push every 3 hours PRN Severe Pain (7 - 10)  melatonin 3 milliGRAM(s) Oral at bedtime PRN Insomnia  naloxone Injectable 0.1 milliGRAM(s) IV Push every 3 minutes PRN For ANY of the following changes in patient status:  A. RR LESS THAN 10 breaths per minute, B. Oxygen saturation LESS THAN 90%, C. Sedation score of 6  ondansetron Injectable 4 milliGRAM(s) IV Push every 8 hours PRN Nausea and/or Vomiting        I&O's Summary    21 Jan 2025 07:01  -  22 Jan 2025 07:00  --------------------------------------------------------  IN: 3850 mL / OUT: 1700 mL / NET: 2150 mL    22 Jan 2025 07:01  -  22 Jan 2025 15:15  --------------------------------------------------------  IN: 500 mL / OUT: 0 mL / NET: 500 mL        PHYSICAL EXAM:  Vital Signs Last 24 Hrs  T(C): 36.8 (22 Jan 2025 13:08), Max: 37.4 (21 Jan 2025 16:11)  T(F): 98.3 (22 Jan 2025 13:08), Max: 99.3 (21 Jan 2025 16:11)  HR: 90 (22 Jan 2025 10:25) (78 - 103)  BP: 101/58 (22 Jan 2025 08:00) (87/51 - 114/64)  BP(mean): 70 (22 Jan 2025 08:00) (58 - 96)  RR: 16 (22 Jan 2025 08:00) (16 - 18)  SpO2: 96% (22 Jan 2025 10:25) (95% - 100%)    Parameters below as of 22 Jan 2025 10:25  Patient On (Oxygen Delivery Method): room air            CONSTITUTIONAL: NAD, well-groomed  ENMT: Moist oral mucosa, no pharyngeal injection or exudates; normal dentition  RESPIRATORY: Normal respiratory effort; lungs are clear to auscultation bilaterally  CARDIOVASCULAR: Regular rate and rhythm, normal S1 and S2, no murmur/rub/gallop; No lower extremity edema; Peripheral pulses are 2+ bilaterally  ABDOMEN: Nontender to palpation, normoactive bowel sounds, no rebound/guarding; No hepatosplenomegaly  MUSCLOSKELETAL:  Normal gait; no clubbing or cyanosis of digits; no joint swelling or tenderness to palpation  PSYCH: A+O to person, place, and time; affect appropriate  NEUROLOGY: CN 2-12 are intact and symmetric; no gross sensory deficits;   SKIN: No rashes; no palpable lesions    LABS:                        6.5    6.30  )-----------( 133      ( 22 Jan 2025 07:38 )             21.7     01-22    142  |  107  |  4.9[L]  ----------------------------<  117[H]  3.5   |  24.0  |  0.49[L]    Ca    7.9[L]      22 Jan 2025 07:38    TPro  4.9[L]  /  Alb  2.3[L]  /  TBili  0.6  /  DBili  x   /  AST  16  /  ALT  11  /  AlkPhos  120  01-22          Urinalysis Basic - ( 22 Jan 2025 07:38 )    Color: x / Appearance: x / SG: x / pH: x  Gluc: 117 mg/dL / Ketone: x  / Bili: x / Urobili: x   Blood: x / Protein: x / Nitrite: x   Leuk Esterase: x / RBC: x / WBC x   Sq Epi: x / Non Sq Epi: x / Bacteria: x        CAPILLARY BLOOD GLUCOSE      POCT Blood Glucose.: 111 mg/dL (22 Jan 2025 13:22)  POCT Blood Glucose.: 145 mg/dL (22 Jan 2025 09:12)  POCT Blood Glucose.: 117 mg/dL (21 Jan 2025 21:44)  POCT Blood Glucose.: 133 mg/dL (21 Jan 2025 17:03)

## 2025-01-23 LAB
ALBUMIN SERPL ELPH-MCNC: 2.6 G/DL — LOW (ref 3.3–5.2)
ALP SERPL-CCNC: 135 U/L — HIGH (ref 40–120)
ALT FLD-CCNC: 13 U/L — SIGNIFICANT CHANGE UP
ANION GAP SERPL CALC-SCNC: 11 MMOL/L — SIGNIFICANT CHANGE UP (ref 5–17)
AST SERPL-CCNC: 23 U/L — SIGNIFICANT CHANGE UP
BILIRUB SERPL-MCNC: 0.5 MG/DL — SIGNIFICANT CHANGE UP (ref 0.4–2)
BUN SERPL-MCNC: 4.8 MG/DL — LOW (ref 8–20)
CALCIUM SERPL-MCNC: 8.2 MG/DL — LOW (ref 8.4–10.5)
CHLORIDE SERPL-SCNC: 104 MMOL/L — SIGNIFICANT CHANGE UP (ref 96–108)
CO2 SERPL-SCNC: 23 MMOL/L — SIGNIFICANT CHANGE UP (ref 22–29)
CREAT SERPL-MCNC: 0.47 MG/DL — LOW (ref 0.5–1.3)
EGFR: 125 ML/MIN/1.73M2 — SIGNIFICANT CHANGE UP
GLUCOSE BLDC GLUCOMTR-MCNC: 103 MG/DL — HIGH (ref 70–99)
GLUCOSE BLDC GLUCOMTR-MCNC: 120 MG/DL — HIGH (ref 70–99)
GLUCOSE BLDC GLUCOMTR-MCNC: 168 MG/DL — HIGH (ref 70–99)
GLUCOSE SERPL-MCNC: 97 MG/DL — SIGNIFICANT CHANGE UP (ref 70–99)
HCT VFR BLD CALC: 26.6 % — LOW (ref 34.5–45)
HCT VFR BLD CALC: 28.4 % — LOW (ref 34.5–45)
HGB BLD-MCNC: 8.3 G/DL — LOW (ref 11.5–15.5)
HGB BLD-MCNC: 8.8 G/DL — LOW (ref 11.5–15.5)
MCHC RBC-ENTMCNC: 29.5 PG — SIGNIFICANT CHANGE UP (ref 27–34)
MCHC RBC-ENTMCNC: 29.9 PG — SIGNIFICANT CHANGE UP (ref 27–34)
MCHC RBC-ENTMCNC: 31 G/DL — LOW (ref 32–36)
MCHC RBC-ENTMCNC: 31.2 G/DL — LOW (ref 32–36)
MCV RBC AUTO: 94.7 FL — SIGNIFICANT CHANGE UP (ref 80–100)
MCV RBC AUTO: 96.6 FL — SIGNIFICANT CHANGE UP (ref 80–100)
NRBC # BLD: 1 /100 WBCS — HIGH (ref 0–0)
NRBC BLD-RTO: 1 /100 WBCS — HIGH (ref 0–0)
PLATELET # BLD AUTO: 173 K/UL — SIGNIFICANT CHANGE UP (ref 150–400)
PLATELET # BLD AUTO: 187 K/UL — SIGNIFICANT CHANGE UP (ref 150–400)
POTASSIUM SERPL-MCNC: 3.6 MMOL/L — SIGNIFICANT CHANGE UP (ref 3.5–5.3)
POTASSIUM SERPL-SCNC: 3.6 MMOL/L — SIGNIFICANT CHANGE UP (ref 3.5–5.3)
PROT SERPL-MCNC: 5.5 G/DL — LOW (ref 6.6–8.7)
RBC # BLD: 2.81 M/UL — LOW (ref 3.8–5.2)
RBC # BLD: 2.94 M/UL — LOW (ref 3.8–5.2)
RBC # FLD: 24.3 % — HIGH (ref 10.3–14.5)
RBC # FLD: 25.1 % — HIGH (ref 10.3–14.5)
SODIUM SERPL-SCNC: 138 MMOL/L — SIGNIFICANT CHANGE UP (ref 135–145)
WBC # BLD: 8.37 K/UL — SIGNIFICANT CHANGE UP (ref 3.8–10.5)
WBC # BLD: 9.45 K/UL — SIGNIFICANT CHANGE UP (ref 3.8–10.5)
WBC # FLD AUTO: 8.37 K/UL — SIGNIFICANT CHANGE UP (ref 3.8–10.5)
WBC # FLD AUTO: 9.45 K/UL — SIGNIFICANT CHANGE UP (ref 3.8–10.5)

## 2025-01-23 PROCEDURE — G0545: CPT

## 2025-01-23 PROCEDURE — 99233 SBSQ HOSP IP/OBS HIGH 50: CPT

## 2025-01-23 PROCEDURE — 99232 SBSQ HOSP IP/OBS MODERATE 35: CPT

## 2025-01-23 RX ORDER — SODIUM CHLORIDE 0.4 %
1 AEROSOL, SPRAY (ML) NASAL
Refills: 0 | Status: DISCONTINUED | OUTPATIENT
Start: 2025-01-23 | End: 2025-01-31

## 2025-01-23 RX ORDER — POTASSIUM CHLORIDE 750 MG/1
40 TABLET, EXTENDED RELEASE ORAL ONCE
Refills: 0 | Status: COMPLETED | OUTPATIENT
Start: 2025-01-23 | End: 2025-01-23

## 2025-01-23 RX ADMIN — NAFCILLIN INJECTION 200 GRAM(S): 2 POWDER, FOR SOLUTION INTRAMUSCULAR; INTRAMUSCULAR; INTRAVENOUS at 13:50

## 2025-01-23 RX ADMIN — ACETAMINOPHEN, DIPHENHYDRAMINE HCL, PHENYLEPHRINE HCL 3 MILLIGRAM(S): 325; 25; 5 TABLET ORAL at 21:23

## 2025-01-23 RX ADMIN — METHADONE HYDROCHLORIDE 5 MILLIGRAM(S): 5 SOLUTION ORAL at 06:12

## 2025-01-23 RX ADMIN — HYDROMORPHONE HYDROCHLORIDE 0.5 MILLIGRAM(S): 4 INJECTION, SOLUTION INTRAMUSCULAR; INTRAVENOUS; SUBCUTANEOUS at 16:30

## 2025-01-23 RX ADMIN — HYDROMORPHONE HYDROCHLORIDE 0.5 MILLIGRAM(S): 4 INJECTION, SOLUTION INTRAMUSCULAR; INTRAVENOUS; SUBCUTANEOUS at 04:19

## 2025-01-23 RX ADMIN — METHYLPHENIDATE HYDROCHLORIDE 20 MILLIGRAM(S): 36 TABLET, EXTENDED RELEASE ORAL at 08:55

## 2025-01-23 RX ADMIN — Medication 500 MILLIGRAM(S): at 00:55

## 2025-01-23 RX ADMIN — HYDROMORPHONE HYDROCHLORIDE 0.5 MILLIGRAM(S): 4 INJECTION, SOLUTION INTRAMUSCULAR; INTRAVENOUS; SUBCUTANEOUS at 21:42

## 2025-01-23 RX ADMIN — OCTREOTIDE ACETATE 100 MICROGRAM(S): 1000 INJECTION INTRAVENOUS; SUBCUTANEOUS at 06:12

## 2025-01-23 RX ADMIN — Medication 1 SPRAY(S): at 12:06

## 2025-01-23 RX ADMIN — HYDROMORPHONE HYDROCHLORIDE 0.5 MILLIGRAM(S): 4 INJECTION, SOLUTION INTRAMUSCULAR; INTRAVENOUS; SUBCUTANEOUS at 04:36

## 2025-01-23 RX ADMIN — HYDROMORPHONE HYDROCHLORIDE 0.5 MILLIGRAM(S): 4 INJECTION, SOLUTION INTRAMUSCULAR; INTRAVENOUS; SUBCUTANEOUS at 09:15

## 2025-01-23 RX ADMIN — ANTISEPTIC SURGICAL SCRUB 1 APPLICATION(S): 0.04 SOLUTION TOPICAL at 10:16

## 2025-01-23 RX ADMIN — MEMANTINE HYDROCHLORIDE 10 MILLIGRAM(S): 7 CAPSULE, EXTENDED RELEASE ORAL at 07:25

## 2025-01-23 RX ADMIN — PANTOPRAZOLE 40 MILLIGRAM(S): 20 TABLET, DELAYED RELEASE ORAL at 06:12

## 2025-01-23 RX ADMIN — DEXTROSE MONOHYDRATE, SODIUM CHLORIDE, AND POTASSIUM CHLORIDE 100 MILLILITER(S): 50; 2.25; 2.24 INJECTION, SOLUTION INTRAVENOUS at 13:49

## 2025-01-23 RX ADMIN — NAFCILLIN INJECTION 200 GRAM(S): 2 POWDER, FOR SOLUTION INTRAMUSCULAR; INTRAMUSCULAR; INTRAVENOUS at 17:27

## 2025-01-23 RX ADMIN — HYDROMORPHONE HYDROCHLORIDE 30 MILLILITER(S): 4 INJECTION, SOLUTION INTRAMUSCULAR; INTRAVENOUS; SUBCUTANEOUS at 11:03

## 2025-01-23 RX ADMIN — Medication 1.25 MILLIGRAM(S): at 14:39

## 2025-01-23 RX ADMIN — HYDROMORPHONE HYDROCHLORIDE 0.5 MILLIGRAM(S): 4 INJECTION, SOLUTION INTRAMUSCULAR; INTRAVENOUS; SUBCUTANEOUS at 05:47

## 2025-01-23 RX ADMIN — HYDROMORPHONE HYDROCHLORIDE 0.5 MILLIGRAM(S): 4 INJECTION, SOLUTION INTRAMUSCULAR; INTRAVENOUS; SUBCUTANEOUS at 15:58

## 2025-01-23 RX ADMIN — Medication 1 APPLICATION(S): at 06:14

## 2025-01-23 RX ADMIN — AMINOCAPROIC ACID 4 GRAM(S): 1000 TABLET ORAL at 21:25

## 2025-01-23 RX ADMIN — Medication 1.25 MILLIGRAM(S): at 08:35

## 2025-01-23 RX ADMIN — Medication 1 APPLICATION(S): at 17:27

## 2025-01-23 RX ADMIN — HYDROMORPHONE HYDROCHLORIDE 0.5 MILLIGRAM(S): 4 INJECTION, SOLUTION INTRAMUSCULAR; INTRAVENOUS; SUBCUTANEOUS at 01:30

## 2025-01-23 RX ADMIN — OCTREOTIDE ACETATE 100 MICROGRAM(S): 1000 INJECTION INTRAVENOUS; SUBCUTANEOUS at 17:27

## 2025-01-23 RX ADMIN — AMINOCAPROIC ACID 4 GRAM(S): 1000 TABLET ORAL at 10:27

## 2025-01-23 RX ADMIN — MEMANTINE HYDROCHLORIDE 5 MILLIGRAM(S): 7 CAPSULE, EXTENDED RELEASE ORAL at 21:25

## 2025-01-23 RX ADMIN — PANTOPRAZOLE 40 MILLIGRAM(S): 20 TABLET, DELAYED RELEASE ORAL at 17:26

## 2025-01-23 RX ADMIN — METHADONE HYDROCHLORIDE 10 MILLIGRAM(S): 5 SOLUTION ORAL at 21:23

## 2025-01-23 RX ADMIN — NAFCILLIN INJECTION 200 GRAM(S): 2 POWDER, FOR SOLUTION INTRAMUSCULAR; INTRAMUSCULAR; INTRAVENOUS at 06:13

## 2025-01-23 RX ADMIN — POTASSIUM CHLORIDE 40 MILLIEQUIVALENT(S): 750 TABLET, EXTENDED RELEASE ORAL at 14:11

## 2025-01-23 RX ADMIN — HYDROMORPHONE HYDROCHLORIDE 30 MILLILITER(S): 4 INJECTION, SOLUTION INTRAMUSCULAR; INTRAVENOUS; SUBCUTANEOUS at 07:24

## 2025-01-23 RX ADMIN — Medication 40 MILLIGRAM(S): at 15:47

## 2025-01-23 RX ADMIN — NAFCILLIN INJECTION 200 GRAM(S): 2 POWDER, FOR SOLUTION INTRAMUSCULAR; INTRAMUSCULAR; INTRAVENOUS at 21:27

## 2025-01-23 RX ADMIN — Medication 1 APPLICATION(S): at 10:17

## 2025-01-23 RX ADMIN — METHADONE HYDROCHLORIDE 5 MILLIGRAM(S): 5 SOLUTION ORAL at 13:57

## 2025-01-23 RX ADMIN — AMINOCAPROIC ACID 4 GRAM(S): 1000 TABLET ORAL at 04:35

## 2025-01-23 RX ADMIN — PREGABALIN CAPSULES, CV 200 MILLIGRAM(S): 225 CAPSULE ORAL at 13:57

## 2025-01-23 RX ADMIN — Medication 4 MILLILITER(S): at 08:35

## 2025-01-23 RX ADMIN — OLANZAPINE 2.5 MILLIGRAM(S): 10 TABLET, FILM COATED ORAL at 21:24

## 2025-01-23 RX ADMIN — Medication 80 MILLIGRAM(S): at 21:27

## 2025-01-23 RX ADMIN — Medication 1: at 13:48

## 2025-01-23 RX ADMIN — HYDROMORPHONE HYDROCHLORIDE 0.5 MILLIGRAM(S): 4 INJECTION, SOLUTION INTRAMUSCULAR; INTRAVENOUS; SUBCUTANEOUS at 20:42

## 2025-01-23 RX ADMIN — NAFCILLIN INJECTION 200 GRAM(S): 2 POWDER, FOR SOLUTION INTRAMUSCULAR; INTRAMUSCULAR; INTRAVENOUS at 10:16

## 2025-01-23 RX ADMIN — PREGABALIN CAPSULES, CV 200 MILLIGRAM(S): 225 CAPSULE ORAL at 21:24

## 2025-01-23 RX ADMIN — AMINOCAPROIC ACID 4 GRAM(S): 1000 TABLET ORAL at 15:49

## 2025-01-23 RX ADMIN — Medication 4 MILLILITER(S): at 20:50

## 2025-01-23 RX ADMIN — PREDNISONE 20 MILLIGRAM(S): 5 TABLET ORAL at 06:12

## 2025-01-23 RX ADMIN — NAFCILLIN INJECTION 200 GRAM(S): 2 POWDER, FOR SOLUTION INTRAMUSCULAR; INTRAMUSCULAR; INTRAVENOUS at 01:30

## 2025-01-23 RX ADMIN — Medication 4 MILLILITER(S): at 14:39

## 2025-01-23 RX ADMIN — Medication 1.25 MILLIGRAM(S): at 20:51

## 2025-01-23 RX ADMIN — Medication 100 MILLIGRAM(S): at 21:27

## 2025-01-23 RX ADMIN — DEXTROMETHORPHAN HBR AND GUAIFENESIN ORAL SOLUTION 10 MILLILITER(S): 10; 100 LIQUID ORAL at 21:25

## 2025-01-23 RX ADMIN — PREGABALIN CAPSULES, CV 200 MILLIGRAM(S): 225 CAPSULE ORAL at 06:12

## 2025-01-23 RX ADMIN — HYDROMORPHONE HYDROCHLORIDE 0.5 MILLIGRAM(S): 4 INJECTION, SOLUTION INTRAMUSCULAR; INTRAVENOUS; SUBCUTANEOUS at 08:55

## 2025-01-23 RX ADMIN — HYDROMORPHONE HYDROCHLORIDE 30 MILLILITER(S): 4 INJECTION, SOLUTION INTRAMUSCULAR; INTRAVENOUS; SUBCUTANEOUS at 19:23

## 2025-01-23 NOTE — PROGRESS NOTE ADULT - SUBJECTIVE AND OBJECTIVE BOX
[facility]  INFECTIOUS DISEASES at [place] / [place] / [place]  =======================================================                              Salazar Toro MD                              Professor Emeritus:  Dr Warner Mcintosh MD            Diplomates American Board of Internal Medicine & Infectious Diseases                                   Tel  [phone] Fax [phone]                                  Hospital Consult line:  [phone]  =======================================================      [place] [id number]    Follow up: MSSA bacteremia    No fevers   Diarrhea improved       Allergies:  pertuzumab (Other (Severe))  Perjeta (Other (Severe))        REVIEW OF SYSTEMS:  CONSTITUTIONAL:  No Fever or chills  HEENT:   No diplopia or blurred vision.  No earache, sore throat or runny nose.  CARDIOVASCULAR:  No Chest Pain  RESPIRATORY:  No cough, shortness of breath  GASTROINTESTINAL:  No nausea, vomiting + diarrhea.  GENITOURINARY:  No dysuria, frequency or urgency. No Blood in urine  MUSCULOSKELETAL:  no joint aches, no muscle pain  SKIN:  No change in skin, hair or nails.  NEUROLOGIC:  No Headaches      Physical Exam:  GEN: NAD  HEENT: normocephalic and atraumatic.    NECK: Supple.   LUNGS: CTA B/L.  HEART: RRR  ABDOMEN: Soft, NT, ND.  +BS.    : No CVA tenderness  EXTREMITIES: Without  edema.  MSK: No joint swelling  NEUROLOGIC: No Focal Deficits   SKIN: No rash      Vitals:  T(F): 98.1 (23 Jan 2025 07:51), Max: 98.7 (22 Jan 2025 20:00)  HR: 107 (23 Jan 2025 08:53)  BP: 97/64 (23 Jan 2025 08:53)  RR: 16 (23 Jan 2025 08:00)  SpO2: 98% (23 Jan 2025 08:37) (93% - 100%)  temp max in last 48H T(F): , Max: 99.3 (01-21-25 @ 16:11)    Current Antibiotics:  nafcillin  IVPB 2 Gram(s) IV Intermittent every 4 hours    Other medications:  acetylcysteine 10%  Inhalation 4 milliLiter(s) Inhalation every 6 hours  aminocaproic acid Tablet 4 Gram(s) Oral every 6 hours  chlorhexidine 2% Cloths 1 Application(s) Topical daily  dextrose 5%. 1000 milliLiter(s) IV Continuous <Continuous>  dextrose 5%. 1000 milliLiter(s) IV Continuous <Continuous>  dextrose 50% Injectable 25 Gram(s) IV Push once  dextrose 50% Injectable 12.5 Gram(s) IV Push once  dextrose 50% Injectable 25 Gram(s) IV Push once  FLUoxetine Solution 80 milliGRAM(s) Oral at bedtime  glucagon  Injectable 1 milliGRAM(s) IntraMuscular once  hydrocortisone 1% Cream 1 Application(s) Topical daily  hydrocortisone 2.5% Rectal Cream 1 Application(s) Rectal two times a day  HYDROmorphone PCA (1 mG/mL) 30 milliLiter(s) PCA Continuous PCA Continuous  insulin lispro (ADMELOG) corrective regimen sliding scale   SubCutaneous three times a day before meals  levalbuterol Inhalation 1.25 milliGRAM(s) Inhalation every 6 hours  memantine 5 milliGRAM(s) Oral at bedtime  memantine 10 milliGRAM(s) Oral with breakfast  methadone    Tablet 10 milliGRAM(s) Oral at bedtime  methadone    Tablet 5 milliGRAM(s) Oral <User Schedule>  methylphenidate 20 milliGRAM(s) Oral <User Schedule>  octreotide  Injectable 100 MICROGram(s) SubCutaneous two times a day  OLANZapine 2.5 milliGRAM(s) Oral at bedtime  pantoprazole  Injectable 40 milliGRAM(s) IV Push every 12 hours  predniSONE   Tablet 20 milliGRAM(s) Oral daily  pregabalin 200 milliGRAM(s) Oral every 8 hours  sodium chloride 0.9% with potassium chloride 20 mEq/L 1000 milliLiter(s) IV Continuous <Continuous>                            8.3    8.37  )-----------( 173      ( 23 Jan 2025 06:54 )             26.6     01-23    138  |  104  |  4.8[L]  ----------------------------<  97  3.6   |  23.0  |  0.47[L]    Ca    8.2[L]      23 Jan 2025 06:54    TPro  5.5[L]  /  Alb  2.6[L]  /  TBili  0.5  /  DBili  x   /  AST  23  /  ALT  13  /  AlkPhos  135[H]  01-23    RECENT CULTURES:  01-17 @ 13:02 .Blood BLOOD     No growth at 5 days    01-17 @ 12:58 .Blood BLOOD     No growth at 5 days    01-11 @ 05:25 .Blood BLOOD     No growth at 5 days    01-11 @ 05:20 .Blood BLOOD     No growth at 5 days    01-10 @ 18:20 .Surgical Swab     No growth at 5 days    01-10 @ 12:20 .Blood BLOOD     No growth at 5 days      WBC Count: 8.37 K/uL (01-23-25 @ 06:54)  WBC Count: 9.45 K/uL (01-22-25 @ 23:42)  WBC Count: 6.30 K/uL (01-22-25 @ 07:38)  WBC Count: 8.60 K/uL (01-21-25 @ 06:58)  WBC Count: 9.59 K/uL (01-20-25 @ 07:25)  WBC Count: 9.09 K/uL (01-20-25 @ 00:44)  WBC Count: 8.96 K/uL (01-19-25 @ 07:18)    Creatinine: 0.47 mg/dL (01-23-25 @ 06:54)  Creatinine: 0.49 mg/dL (01-22-25 @ 07:38)  Creatinine: 0.42 mg/dL (01-21-25 @ 06:58)  Creatinine: 0.51 mg/dL (01-20-25 @ 07:25)  Creatinine: 0.42 mg/dL (01-19-25 @ 07:18)    Procalcitonin: 0.15 ng/mL (01-21-25 @ 06:58)     SARS-CoV-2: NotDetec (01-05-25 @ 10:54)  SARS-CoV-2 Result: NotDetec (01-02-25 @ 19:20)      Influenza AH3 (RapRVP): Detected (01.05.25 @ 10:54)    Urinalysis + Microscopic Examination (01.06.25 @ 03:00)    pH Urine: 6.0   Urine Appearance: Clear   Color: Yellow   Specific Gravity: 1.015   Protein, Urine: Trace mg/dL   Glucose Qualitative, Urine: Negative mg/dL   Ketone - Urine: Negative mg/dL   Blood, Urine: Small   Bilirubin: Negative   Urobilinogen: 1.0 mg/dL   Leukocyte Esterase Concentration: Small   Nitrite: Negative   White Blood Cell - Urine: 6 /HPF   Red Blood Cell - Urine: 5 /HPF   Bacteria: Occasional /HPF   Epithelial Cells: 2 /HPF      GI PCR Panel Stool (01.15.25 @ 06:15)    GI PCR Panel: Detected   Giardia lamblia: Detected   Norovirus GI/GII: Detected        < from: TTE Limited W or WO Ultrasound Enhancing Agent (01.06.25 @ 12:55) >  CONCLUSIONS:      1. Technically difficult image quality.   2. Left ventricular systolic function is normal with an ejection fraction visually estimated at 65 to 70 %.   3. Normal right ventricular cavity size, with normal wall thickness, and normal right ventricular systolic function.   4. Compared to the transthoracic echocardiogram performed on 7/21/2024, there have been no significant interval changes.    < end of copied text >

## 2025-01-23 NOTE — PROGRESS NOTE ADULT - ASSESSMENT
39y  Female with h/o metastatic breast cancer ER/[place] Neg, HER-2 + on chemotherapy (last dose 12/26/24) (mets to lung, liver, spleen, spine, bone and brain), gastritis w/ intermittent episodes of dark stool (follows w/ Dr. Rosado GI and is on PPI and octreotide daily). Patient presented 1/5 with c/o worsening SOB.  She was seen in ED 1/2/25 for for weakness and SOB at which time she was found to have Hb of 5 requiring PRBC transfusion and positive for Flu A and started on tamiflu and was discharged from the ED on 1/3/25.  Her respiratory status has worsened since then w/ productive cough associated with body aches and chills.  Denies sick contacts but has had many frequent hospital visits. Patient has been afebrile, no leukocytosis. Was placed on BIPAP for Worsening respiratory status. continued on Tamiflu. Vancomycn and Zosyn was added. Blood cultures with MSSA. ID input requested.       MSSA bacteremia   Staphylococcus aureus PNA   Influenza A   metastatic breast cancer ER/VT Neg, HER-2 + on chemotherapy   Port in place s/p explant 1/10/25  Stool PCR + Norovirus and Giardia       - Blood cultures 1/5, 1/7, 1/9 reporting MSSA    - Repeat blood cultures 1/10,  1/11/25, 1/17/25 no growth   - Sputum Cx 1/6 Staphylococcus aureus  - Urine Cx 1/5 reporting 10k - 49k Escherichia coli  of ? significance since UA not concerning for UTI   - Stool PCR + Norovirus and Giardia   - RVP/COVID 19 PCR + Influenza A  - S/P Port removal 1/10/25  - CTA Chest reporting No acute pulmonary embolism. Wide spread patchy airspace opacities bilaterally, increased since the prior exam.  - US RUQ reporting No evidence of acute cholecystitis or biliary ductal dilatation.  - Procalcitonin level 2.32 --> 1.37, ordered for the AM   - TTE 1/6 with no veg  - Called cardiology consult for MARYANN if possible   - GI eval noted, no plan for EGD   - s/p Port removal 1/10/25  - Patient still with dropping H/H, receiving multiple transfusion weekly  - No plan for MARYANN if no EGD is done  - If no MARYANN done will need 6 weeks of antibiotics from port explant (2/20/25)  - Completed oseltamivir  1/10/25  - Continue Nafcillin 2gm IV [id number]  - For Norovirus, continue hydration and supportive care  - For Giardia, Completed 5 days of Albendazole   - Hold off on PICC/Midline for now unless needed for reasons other than infectious diseases  - Follow up cultures  - Trend Fever  - Trend WBC      Thank you for allowing me to participate in the care of your patient.   Will Follow    Discussed treatment plan with:  Clinical pharmacy, Dr Kennedy

## 2025-01-23 NOTE — PROCEDURE NOTE - ADDITIONAL PROCEDURE DETAILS
4FR 15CM 30CIRC BARD POWER MIDLINE good flash ns flush left brachial vein by ultrasound guidance.  Patient tolerated well.

## 2025-01-23 NOTE — PROGRESS NOTE ADULT - ASSESSMENT
39y  Female with h/o metastatic breast cancer ER/MO Neg, HER-2 + on chemotherapy (last dose 12/26/24) (mets to lung, liver, spleen, spine, bone and brain), gastritis w/ intermittent episodes of dark stool (follows w/ Dr. Rosado GI and is on PPI and octreotide daily). Patient presented 1/5 with c/o worsening SOB.  She was seen in ED 1/2/25 for for weakness and SOB at which time she was found to have Hb of 5 requiring PRBC transfusion and positive for Flu A and started on tamiflu and was discharged from the ED on 1/3/25.  Her respiratory status has worsened since then w/ productive cough associated with body aches and chills. Admitted for sepsis ,acute hypoxic respiratory failure with flu A. s/p HFNC for worsening respiratory status. S/P course of Tamiflu. Vancomycin and Zosyn was added for possible superimposed bacterial pna. Blood cultures with MSSA.  Cardiology consultation requested for evaluation of endocarditis. Blood cultures 1/5, 1/7, 1/9 reporting MSSA , Repeat blood cultures ngtd on 1/11/25, Sputum Cx 1/6 Staphylococcus aureus. MARYANN hold for active gib requring transfusion. Pt had large BM likley diverticular bleed. Had recent scopy. Gi following. ct abd/pelvis w/iv contrast no active bleed (1/13). Urine Cx 1/5 reporting 10k - 49k Escherichia coli  of ? significance  since UA not concerning for UTI.  RVP/COVID 19 PCR + Influenza A.GI PCR  positive norovirus,Giardia.    #Nororvirus,Giadia gastroenteritis   -GI PCR  positive norovirus,Giadia  -Albendazole per ID (dc on 01/22 -- completed 5 day course)  -ID on board, recs noted  -fluids as needed, encourage po hydration  -bm less bloody, improving    #Acute blood loss anemia   #GIB  -s/p multiple transfusions during this hospital stay  -goal hgb of 7   -CTA abd/pelvis  no active bleed   -PPI/OCTRIO  -GI eval with no plan for scopes 2/2 recently done.  Still with bleeding.  reed GI history, ? GAVE disease per GI notes Has had multiple EGD/colonoscopies in past as well.   -spoke to GI 01/21: multiple scopes done - no sources of bleeding identified -- no plan for any endoscopy in the near future // as per GI pt will likely be transfusion dependent -- need to arrange outpatient vs inpatient transfusions  -hem/onc following   -hem/onc started Amicar on1/16  -bm with less blood       #Thrombocytopenia  -likely from chemo/sepsis  -will monitor cbc        #Sepsis 2/2 Flu A w/ superimposed multifocal PNA  #Acute hypoxic respiratory failure 2/2 Flu A w/ superimposed multifocal PNA   - on NC, comfortable  - persistently positive RVP for flu A  - s/p  tamiflu   - repeat CT 1/8 shows improved pleural effusion but worsening GGOs, unclear if infectious or malignant etiology  - repeat CT 1/9 with similar findings  - repeat CT next week to evaluate for improvement of infiltrates  - po steroid tapering dose  - c/w abx per ID recs  - sputum clx growing moderate staph aureus   - TTE EF 65-70%, no WMA    #MSSA bacteremia  - blood clx positive 1/5, repeat bl c/s (1/11) ngtd  - ID following  - c/w nafcillin  per id (if pt unable to have MARYANN will need 6 week course(until 02/20/2025) of nafcillin from chemoport removal)  - MARYANN on hold for gib. f/u cardio rec  -f/u ID REC        #Chronic normocytic anemia 2/2 metastatic breast cancer on chemo and 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   #Thrombocytopenia likely 2/2 sepsis   #Breast cancer ER/MO Neg, HER-2 pos on chemotherapy w/ mets to lung, liver, spleen, spine, bone and brain  - Baseline Hb ranges 8-9    - s/p prbc transfusion on 1/2, 1/8 and 1/9,1/11,1/13,1/14  - trend CBC, transfuse for hgb <7  - c/w home sub q octreotide and IV protonix  - Monitor CBC and transfuse for Hb<7 and plts<20K in setting of sepsis   - NYBC following  - monitor on tele and     #Breast cancer ER/MO Neg, HER-2 pos on chemotherapy w/ mets to lung, liver, spleen, spine, bone and brain  - c/w pregabalin, methylphenidate (confirmed on ISTOP Reference #: [id number])  -on dilaudid pca pump -- demand dosing to 0.5mg (lockout 8mins) with 0.1mg qhourly running  - pain management following, recalled today to assist in transitioning patient from pca to oral regimen w possible patch    dvt ppx scd  disposition: still medically active, need to wean off dilaudid pca and establish oral pain regimen. need to establish outpatient blood transfusions

## 2025-01-23 NOTE — PROGRESS NOTE ADULT - ASSESSMENT
39y  Female with h/o metastatic breast cancer ER/LA Neg, HER-2 + on chemotherapy (last dose 12/26/24) (mets to lung, liver, spleen, spine, bone and brain), gastritis w/ intermittent episodes of dark stool (follows w/ Dr. Rosado GI and is on PPI and octreotide daily). Patient presented 1/5 with c/o worsening SOB.  She was seen in ED 1/2/25 for for weakness and SOB at which time she was found to have Hb of 5 requiring PRBC transfusion and positive for Flu A and started on tamiflu and was discharged from the ED on 1/3/25.  Her respiratory status has worsened since then w/ productive cough associated with body aches and chills.  Denies sick contacts but has had many frequent hospital visits. Patient has been afebrile, no leukocytosis. Was placed on BIPAP for Worsening respiratory status. continued on Tamiflu. Vancomycn and Zosyn was added. Blood cultures with MSSA.     Metastatic breast cancer   - last dose of trastuzumab was on 12/26/24.  -All treatment on hold at the moment.  -Follow up with primary oncologist, Dr. Chapa after discharge    Cytopenias   - secondary to malignancy and acute illness and now GIB  - S/P multiple transfusions  - GI evaluated for large bloody bowel movement  Suspect rectal bleeding is due to diverticular bleed which are self-limiting - If patient becomes hemodynamically unstable, requiring multiple transfusions,   - CT A/P No evidence of active intraluminal extravasation of contrast.   - GI f/u noted.  no plans for scope.  Previously scoped in Nov. 2024. internal hemorrhoids, gastric erosions, no active bleeding site   - c/w home sub q octreotide and IV protonix  - Continue Amicar 4 grams Q 6 H for bleeding- will titrate according to HGB, given drop I will keep on her on the same dose.  - Hgb 6.7 today, getting transfused.  -Transfuse if hgb<7.0.      Respiratory failure   - multifocal pneumonia and flu+  - S/P oseltamivir    - Management as per primary team.  - TTE 1/6 with no veg  - Cardiology following- . No MARYANN at this time until stabilizes and source of bleed ascertained  - feeling better    MSSA Bacteremia  - cultures persistently positive. pt afebrile.   --  ID following -On nafcillin   -- S/P Port removal 1/10/25-  PICC or midline prior to d/c for outpt chemo   She is concerned about access. Will place once closer to discharge.  + Noro virus and giardia lamblia  - ID following For Giardia, confirm the Giardia stool Ag  - s/p Albendazole 400mg PO q 24hours x 5 days for Giardia     Cont aggressive pain management. Remains on PCA pump.    Will follow                39y  Female with h/o metastatic breast cancer ER/[place] Neg, HER-2 + on chemotherapy (last dose 12/26/24) (mets to lung, liver, spleen, spine, bone and brain), gastritis w/ intermittent episodes of dark stool (follows w/ Dr. Rosado GI and is on PPI and octreotide daily). Patient presented 1/5 with c/o worsening SOB.  She was seen in ED 1/2/25 for for weakness and SOB at which time she was found to have Hb of 5 requiring PRBC transfusion and positive for Flu A and started on tamiflu and was discharged from the ED on 1/3/25.  Her respiratory status has worsened since then w/ productive cough associated with body aches and chills.  Denies sick contacts but has had many frequent hospital visits. Patient has been afebrile, no leukocytosis. Was placed on BIPAP for Worsening respiratory status. continued on Tamiflu. Vancomycn and Zosyn was added. Blood cultures with MSSA.     Metastatic breast cancer   - last dose of trastuzumab was on 12/26/24.  -All treatment on hold at the moment.  -Follow up with primary oncologist, Dr. Chapa after discharge    Cytopenias   - secondary to malignancy and acute illness and now GIB  - S/P multiple transfusions  - GI evaluated for large bloody bowel movement  Suspect rectal bleeding is due to diverticular bleed which are self-limiting - If patient becomes hemodynamically unstable, requiring multiple transfusions,   - CT A/P No evidence of active intraluminal extravasation of contrast.   - GI f/u noted.  no plans for scope.  Previously scoped in Nov. 2024. internal hemorrhoids, gastric erosions, no active bleeding site   - c/w home sub q octreotide and IV protonix  - Continue Amicar 4 grams Q 6 H for bleeding- will titrate according to HGB and bleeding. goal of minimal effective dose to prevent bleeding.   Recommend GI reeval for possible scope and treatment of hemorrhoids if feasible since bleeding now minimal   - Hgb 8.3 today,   -Transfuse if hgb<7.0.      Respiratory failure   - multifocal pneumonia and flu+  - S/P oseltamivir    - Management as per primary team.  - TTE 1/6 with no veg  - Cardiology following- . No MARYANN at this time until stabilizes and source of bleed ascertained  - feeling better    MSSA Bacteremia  - cultures persistently positive. pt afebrile.   --  ID following -On nafcillin   -- S/P Port removal 1/10/25-  PICC line prior to d/c for outpt chemo   She is concerned about access. Will place once closer to discharge.  + Noro virus and giardia lamblia  - ID following For Giardia, confirm the Giardia stool Ag  - s/p Albendazole 400mg PO q 24hours x 5 days for Giardia     Cont aggressive pain management. Remains on PCA pump.  transition to PO pain meds   Will follow

## 2025-01-23 NOTE — PROGRESS NOTE ADULT - SUBJECTIVE AND OBJECTIVE BOX
[facility] Division of Hospital Medicine    SUBJECTIVE / OVERNIGHT EVENTS:    pt seen and examined at bedside  pt feeling okay, still requiring around the clock demand dosing from pca pump  breathing better  le swelling present - pt is amenable to iv lasix today  no cp, sob, palpitations  tolerating po diet    MEDICATIONS  (STANDING):  acetylcysteine 10%  Inhalation 4 milliLiter(s) Inhalation every 6 hours  aminocaproic acid Tablet 4 Gram(s) Oral every 6 hours  chlorhexidine 2% Cloths 1 Application(s) Topical daily  dextrose 5%. 1000 milliLiter(s) (50 mL/Hr) IV Continuous <Continuous>  dextrose 5%. 1000 milliLiter(s) (100 mL/Hr) IV Continuous <Continuous>  dextrose 50% Injectable 25 Gram(s) IV Push once  dextrose 50% Injectable 12.5 Gram(s) IV Push once  dextrose 50% Injectable 25 Gram(s) IV Push once  FLUoxetine Solution 80 milliGRAM(s) Oral at bedtime  glucagon  Injectable 1 milliGRAM(s) IntraMuscular once  hydrocortisone 1% Cream 1 Application(s) Topical daily  hydrocortisone 2.5% Rectal Cream 1 Application(s) Rectal two times a day  HYDROmorphone PCA (1 mG/mL) 30 milliLiter(s) PCA Continuous PCA Continuous  insulin lispro (ADMELOG) corrective regimen sliding scale   SubCutaneous three times a day before meals  levalbuterol Inhalation 1.25 milliGRAM(s) Inhalation every 6 hours  memantine 5 milliGRAM(s) Oral at bedtime  memantine 10 milliGRAM(s) Oral with breakfast  methadone    Tablet 10 milliGRAM(s) Oral at bedtime  methadone    Tablet 5 milliGRAM(s) Oral <User Schedule>  methylphenidate 20 milliGRAM(s) Oral <User Schedule>  nafcillin  IVPB 2 Gram(s) IV Intermittent every 4 hours  octreotide  Injectable 100 MICROGram(s) SubCutaneous two times a day  OLANZapine 2.5 milliGRAM(s) Oral at bedtime  pantoprazole  Injectable 40 milliGRAM(s) IV Push every 12 hours  potassium chloride    Tablet ER 40 milliEquivalent(s) Oral once  predniSONE   Tablet 20 milliGRAM(s) Oral daily  pregabalin 200 milliGRAM(s) Oral every 8 hours  sodium chloride 0.9% with potassium chloride 20 mEq/L 1000 milliLiter(s) (100 mL/Hr) IV Continuous <Continuous>    MEDICATIONS  (PRN):  acetaminophen     Tablet .. 650 milliGRAM(s) Oral every 6 hours PRN Temp greater or equal to 38C (100.4F), Mild Pain (1 - 3)  aluminum hydroxide/magnesium hydroxide/simethicone Suspension 30 milliLiter(s) Oral every 4 hours PRN Dyspepsia  benzonatate 100 milliGRAM(s) Oral every 8 hours PRN Cough  dextrose Oral Gel 15 Gram(s) Oral once PRN Blood Glucose LESS THAN 70 milliGRAM(s)/deciliter  guaifenesin/dextromethorphan Oral Liquid 10 milliLiter(s) Oral every 4 hours PRN Cough  HYDROmorphone  Injectable 0.5 milliGRAM(s) IV Push every 3 hours PRN Severe Pain (7 - 10)  melatonin 3 milliGRAM(s) Oral at bedtime PRN Insomnia  naloxone Injectable 0.1 milliGRAM(s) IV Push every 3 minutes PRN For ANY of the following changes in patient status:  A. RR LESS THAN 10 breaths per minute, B. Oxygen saturation LESS THAN 90%, C. Sedation score of 6  ondansetron Injectable 4 milliGRAM(s) IV Push every 8 hours PRN Nausea and/or Vomiting  sodium chloride 0.65% Nasal 1 Spray(s) Both Nostrils five times a day PRN Nasal Congestion        I&O's Summary    22 Jan 2025 07:01  -  23 Jan 2025 07:00  --------------------------------------------------------  IN: 2150 mL / OUT: 0 mL / NET: 2150 mL    23 Jan 2025 07:01  -  23 Jan 2025 13:51  --------------------------------------------------------  IN: 700 mL / OUT: 1300 mL / NET: -600 mL        PHYSICAL EXAM:  Vital Signs Last 24 Hrs  T(C): 36.8 (23 Jan 2025 12:15), Max: 37.1 (22 Jan 2025 20:00)  T(F): 98.2 (23 Jan 2025 12:15), Max: 98.7 (22 Jan 2025 20:00)  HR: 93 (23 Jan 2025 12:00) (85 - 108)  BP: 122/67 (23 Jan 2025 12:00) (96/59 - 122/67)  BP(mean): 84 (23 Jan 2025 12:00) (69 - 99)  RR: 17 (23 Jan 2025 12:00) (12 - 20)  SpO2: 97% (23 Jan 2025 12:00) (93% - 100%)    Parameters below as of 23 Jan 2025 12:00  Patient On (Oxygen Delivery Method): nasal cannula  O2 Flow (L/min): 2          CONSTITUTIONAL: NAD, well-groomed  ENMT: Moist oral mucosa, no pharyngeal injection or exudates; normal dentition  RESPIRATORY: Normal respiratory effort; lungs are clear to auscultation bilaterally  CARDIOVASCULAR: Regular rate and rhythm, normal S1 and S2, no murmur/rub/gallop; No lower extremity edema; Peripheral pulses are 2+ bilaterally  ABDOMEN: Nontender to palpation, normoactive bowel sounds, no rebound/guarding; No hepatosplenomegaly  MUSCLOSKELETAL:  2+ le edema present bilaterally  PSYCH: A+O to person, place, and time; affect appropriate  NEUROLOGY: CN 2-12 are intact and symmetric; no gross sensory deficits;   SKIN: No rashes; no palpable lesions    LABS:                        8.3    8.37  )-----------( 173      ( 23 Jan 2025 06:54 )             26.6     01-23    138  |  104  |  4.8[L]  ----------------------------<  97  3.6   |  23.0  |  0.47[L]    Ca    8.2[L]      23 Jan 2025 06:54    TPro  5.5[L]  /  Alb  2.6[L]  /  TBili  0.5  /  DBili  x   /  AST  23  /  ALT  13  /  AlkPhos  135[H]  01-23          Urinalysis Basic - ( 23 Jan 2025 06:54 )    Color: x / Appearance: x / SG: x / pH: x  Gluc: 97 mg/dL / Ketone: x  / Bili: x / Urobili: x   Blood: x / Protein: x / Nitrite: x   Leuk Esterase: x / RBC: x / WBC x   Sq Epi: x / Non Sq Epi: x / Bacteria: x        CAPILLARY BLOOD GLUCOSE      POCT Blood Glucose.: 168 mg/dL (23 Jan 2025 13:02)  POCT Blood Glucose.: 120 mg/dL (23 Jan 2025 08:53)  POCT Blood Glucose.: 111 mg/dL (22 Jan 2025 18:00)

## 2025-01-23 NOTE — PROGRESS NOTE ADULT - SUBJECTIVE AND OBJECTIVE BOX
39y  Female with h/o metastatic breast cancer ER/[place] Neg, HER-2 + on chemotherapy (last dose 12/26/24) (mets to lung, liver, spleen, spine, bone and brain), gastritis w/ intermittent episodes of dark stool (follows w/ Dr. Rosado GI and is on PPI and octreotide daily). Patient presented 1/5 with c/o worsening SOB.  She was seen in ED 1/2/25 for for weakness and SOB at which time she was found to have Hb of 5 requiring PRBC transfusion and positive for Flu A and started on tamiflu and was discharged from the ED on 1/3/25.  Her respiratory status has worsened since then w/ productive cough associated with body aches and chills.  Denies sick contacts but has had many frequent hospital visits. Patient has been afebrile, no leukocytosis. Was placed on BIPAP for Worsening respiratory status. continued on Tamiflu. Vancomycn and Zosyn was added. Blood cultures with MSSA.     - 1/22/2025: She says that he pain is somewhat controlled with her current regimen. She feels that dilaudid works reasonably well and that she is unable to tolerate fentanyl. Otherwise, breathing is ok and no n/v/d. She says that she has noted that her legs are somewhat swollen but doppler studies were negative for dvt. She has not noted any bleeding from any site. She is concerned though that she will need some form of venous access once she is discharged since her veins are so bad and she is going to need labs, fluids, iron, treatments in general to be given as an outpatient.      PAST MEDICAL & SURGICAL HISTORY:  H/O compression fracture of spine  Anxiety  Metastatic breast cancer  H/O pleural effusion  Pericardial effusion  S/P tonsillectomy  H/O chest tube placement  12/23/21  S/P pericardiocentesis  12/28/21    Allergies  pertuzumab (Other (Severe))  Perjeta (Other (Severe))    MEDICATIONS  (STANDING):  acetylcysteine 10%  Inhalation 4 milliLiter(s) Inhalation every 6 hours  aminocaproic acid Tablet 4 Gram(s) Oral every 6 hours  chlorhexidine 2% Cloths 1 Application(s) Topical daily  dextrose 5%. 1000 milliLiter(s) (50 mL/Hr) IV Continuous <Continuous>  dextrose 5%. 1000 milliLiter(s) (100 mL/Hr) IV Continuous <Continuous>  dextrose 50% Injectable 25 Gram(s) IV Push once  dextrose 50% Injectable 12.5 Gram(s) IV Push once  dextrose 50% Injectable 25 Gram(s) IV Push once  FLUoxetine Solution 80 milliGRAM(s) Oral at bedtime  glucagon  Injectable 1 milliGRAM(s) IntraMuscular once  hydrocortisone 1% Cream 1 Application(s) Topical daily  hydrocortisone 2.5% Rectal Cream 1 Application(s) Rectal two times a day  HYDROmorphone PCA (1 mG/mL) 30 milliLiter(s) PCA Continuous PCA Continuous  insulin lispro (ADMELOG) corrective regimen sliding scale   SubCutaneous three times a day before meals  levalbuterol Inhalation 1.25 milliGRAM(s) Inhalation every 6 hours  memantine 5 milliGRAM(s) Oral at bedtime  memantine 10 milliGRAM(s) Oral with breakfast  methadone    Tablet 10 milliGRAM(s) Oral at bedtime  methadone    Tablet 5 milliGRAM(s) Oral <User Schedule>  methylphenidate 20 milliGRAM(s) Oral <User Schedule>  nafcillin  IVPB 2 Gram(s) IV Intermittent every 4 hours  octreotide  Injectable 100 MICROGram(s) SubCutaneous two times a day  OLANZapine 2.5 milliGRAM(s) Oral at bedtime  pantoprazole  Injectable 40 milliGRAM(s) IV Push every 12 hours  predniSONE   Tablet 20 milliGRAM(s) Oral daily  pregabalin 200 milliGRAM(s) Oral every 8 hours  sodium chloride 0.9% with potassium chloride 20 mEq/L 1000 milliLiter(s) (100 mL/Hr) IV Continuous <Continuous>    MEDICATIONS  (PRN):  acetaminophen     Tablet .. 650 milliGRAM(s) Oral every 6 hours PRN Temp greater or equal to 38C (100.4F), Mild Pain (1 - 3)  aluminum hydroxide/magnesium hydroxide/simethicone Suspension 30 milliLiter(s) Oral every 4 hours PRN Dyspepsia  benzonatate 100 milliGRAM(s) Oral every 8 hours PRN Cough  dextrose Oral Gel 15 Gram(s) Oral once PRN Blood Glucose LESS THAN 70 milliGRAM(s)/deciliter  guaifenesin/dextromethorphan Oral Liquid 10 milliLiter(s) Oral every 4 hours PRN Cough  HYDROmorphone  Injectable 0.5 milliGRAM(s) IV Push every 3 hours PRN Severe Pain (7 - 10)  melatonin 3 milliGRAM(s) Oral at bedtime PRN Insomnia  naloxone Injectable 0.1 milliGRAM(s) IV Push every 3 minutes PRN For ANY of the following changes in patient status:  A. RR LESS THAN 10 breaths per minute, B. Oxygen saturation LESS THAN 90%, C. Sedation score of 6  ondansetron Injectable 4 milliGRAM(s) IV Push every 8 hours PRN Nausea and/or Vomiting    Vital Signs Last 24 Hrs  T(C): 36.7 (01-23-25 @ 07:51), Max: 37.1 (01-22-25 @ 20:00)  T(F): 98.1 (01-23-25 @ 07:51), Max: 98.7 (01-22-25 @ 20:00)  HR: 107 (01-23-25 @ 08:53) (85 - 107)  BP: 97/64 (01-23-25 @ 08:53) (96/59 - 119/83)  BP(mean): 75 (01-23-25 @ 08:53) (69 - 99)  RR: 16 (01-23-25 @ 08:00) (12 - 20)  SpO2: 98% (01-23-25 @ 08:37) (93% - 100%)      PE:  NAD  On O2 NC  bilateral rales  abd soft, nd, nt  a/o x 3      CBC                          8.3    8.37  )-----------( 173      ( 23 Jan 2025 06:54 )             26.6                           7.5    8.60  )-----------( 156      ( 21 Jan 2025 06:58 )             25.2                             8.1    9.59  )-----------( 158      ( 20 Jan 2025 07:25 )             27.1                             8.3    8.96  )-----------( 156      ( 19 Jan 2025 07:18 )             27.8                           8.6    9.26  )-----------( 136      ( 18 Jan 2025 04:55 )             27.8                             6.4    9.75  )-----------( 130      ( 17 Jan 2025 06:45 )             20.7         Chem:       01-21    139  |  105  |  4.5[L]  ----------------------------<  132[H]  3.8   |  24.0  |  0.42[L]    Ca    8.0[L]      21 Jan 2025 06:58    TPro  5.1[L]  /  Alb  2.3[L]  /  TBili  0.5  /  DBili  x   /  AST  19  /  ALT  12  /  AlkPhos  140[H]  01-21 01-20    138  |  103  |  4.9[L]  ----------------------------<  167[H]  3.9   |  25.0  |  0.51    Ca    8.0[L]      20 Jan 2025 07:25    TPro  5.5[L]  /  Alb  2.3[L]  /  TBili  0.6  /  DBili  x   /  AST  19  /  ALT  13  /  AlkPhos  165[H]  01-20 01-17    139  |  104  |  8.7  ----------------------------<  163[H]  2.9[LL]   |  26.0  |  0.70    Ca    7.3[L]      17 Jan 2025 06:45  Phos  4.2     01-17  Mg     1.5     01-17    TPro  5.0[L]  /  Alb  2.1[L]  /  TBili  0.8  /  DBili  0.3  /  AST  17  /  ALT  13  /  AlkPhos  127[H]  01-16      01-15    138  |  102  |  12.3  ----------------------------<  135[H]  3.6   |  25.0  |  0.67    Ca    8.1[L]      15 Cristhian 2025 06:10      01-14    141  |  103  |  13.4  ----------------------------<  127[H]  3.4[L]   |  30.0[H]  |  0.54    Ca    8.1[L]      14 Jan 2025 04:48        01-13    141  |  103  |  20.3[H]  ----------------------------<  137[H]  3.8   |  27.0  |  0.47[L]    Ca    8.2[L]      13 Jan 2025 06:10                       39y  Female with h/o metastatic breast cancer ER/[place] Neg, HER-2 + on chemotherapy (last dose 12/26/24) (mets to lung, liver, spleen, spine, bone and brain), gastritis w/ intermittent episodes of dark stool (follows w/ Dr. Rosado GI and is on PPI and octreotide daily). Patient presented 1/5 with c/o worsening SOB.  She was seen in ED 1/2/25 for for weakness and SOB at which time she was found to have Hb of 5 requiring PRBC transfusion and positive for Flu A and started on tamiflu and was discharged from the ED on 1/3/25.  Her respiratory status has worsened since then w/ productive cough associated with body aches and chills.  Denies sick contacts but has had many frequent hospital visits. Patient has been afebrile, no leukocytosis. Was placed on BIPAP for Worsening respiratory status. continued on Tamiflu. Vancomycn and Zosyn was added. Blood cultures with MSSA.     - 1/22/2025: She says that he pain is somewhat controlled with her current regimen. She feels that dilaudid works reasonably well and that she is unable to tolerate fentanyl. Otherwise, breathing is ok and no n/v/d. She says that she has noted that her legs are somewhat swollen but doppler studies were negative for dvt. She has not noted any bleeding from any site. She is concerned though that she will need some form of venous access once she is discharged since her veins are so bad and she is going to need labs, fluids, iron, treatments in general to be given as an outpatient.    1/23:  pt had BM this am with little blood, no fevers.  pain better, still on PCA. c/o LE swelling. dopplers negative   midline in place.        PAST MEDICAL & SURGICAL HISTORY:  H/O compression fracture of spine  Anxiety  Metastatic breast cancer  H/O pleural effusion  Pericardial effusion  S/P tonsillectomy  H/O chest tube placement  12/23/21  S/P pericardiocentesis  12/28/21    Allergies  pertuzumab (Other (Severe))  Perjeta (Other (Severe))    MEDICATIONS  (STANDING):  acetylcysteine 10%  Inhalation 4 milliLiter(s) Inhalation every 6 hours  aminocaproic acid Tablet 4 Gram(s) Oral every 6 hours  chlorhexidine 2% Cloths 1 Application(s) Topical daily  dextrose 5%. 1000 milliLiter(s) (50 mL/Hr) IV Continuous <Continuous>  dextrose 5%. 1000 milliLiter(s) (100 mL/Hr) IV Continuous <Continuous>  dextrose 50% Injectable 25 Gram(s) IV Push once  dextrose 50% Injectable 12.5 Gram(s) IV Push once  dextrose 50% Injectable 25 Gram(s) IV Push once  FLUoxetine Solution 80 milliGRAM(s) Oral at bedtime  glucagon  Injectable 1 milliGRAM(s) IntraMuscular once  hydrocortisone 1% Cream 1 Application(s) Topical daily  hydrocortisone 2.5% Rectal Cream 1 Application(s) Rectal two times a day  HYDROmorphone PCA (1 mG/mL) 30 milliLiter(s) PCA Continuous PCA Continuous  insulin lispro (ADMELOG) corrective regimen sliding scale   SubCutaneous three times a day before meals  levalbuterol Inhalation 1.25 milliGRAM(s) Inhalation every 6 hours  memantine 5 milliGRAM(s) Oral at bedtime  memantine 10 milliGRAM(s) Oral with breakfast  methadone    Tablet 10 milliGRAM(s) Oral at bedtime  methadone    Tablet 5 milliGRAM(s) Oral <User Schedule>  methylphenidate 20 milliGRAM(s) Oral <User Schedule>  nafcillin  IVPB 2 Gram(s) IV Intermittent every 4 hours  octreotide  Injectable 100 MICROGram(s) SubCutaneous two times a day  OLANZapine 2.5 milliGRAM(s) Oral at bedtime  pantoprazole  Injectable 40 milliGRAM(s) IV Push every 12 hours  predniSONE   Tablet 20 milliGRAM(s) Oral daily  pregabalin 200 milliGRAM(s) Oral every 8 hours  sodium chloride 0.9% with potassium chloride 20 mEq/L 1000 milliLiter(s) (100 mL/Hr) IV Continuous <Continuous>    MEDICATIONS  (PRN):  acetaminophen     Tablet .. 650 milliGRAM(s) Oral every 6 hours PRN Temp greater or equal to 38C (100.4F), Mild Pain (1 - 3)  aluminum hydroxide/magnesium hydroxide/simethicone Suspension 30 milliLiter(s) Oral every 4 hours PRN Dyspepsia  benzonatate 100 milliGRAM(s) Oral every 8 hours PRN Cough  dextrose Oral Gel 15 Gram(s) Oral once PRN Blood Glucose LESS THAN 70 milliGRAM(s)/deciliter  guaifenesin/dextromethorphan Oral Liquid 10 milliLiter(s) Oral every 4 hours PRN Cough  HYDROmorphone  Injectable 0.5 milliGRAM(s) IV Push every 3 hours PRN Severe Pain (7 - 10)  melatonin 3 milliGRAM(s) Oral at bedtime PRN Insomnia  naloxone Injectable 0.1 milliGRAM(s) IV Push every 3 minutes PRN For ANY of the following changes in patient status:  A. RR LESS THAN 10 breaths per minute, B. Oxygen saturation LESS THAN 90%, C. Sedation score of 6  ondansetron Injectable 4 milliGRAM(s) IV Push every 8 hours PRN Nausea and/or Vomiting    Vital Signs Last 24 Hrs  T(C): 36.7 (01-23-25 @ 07:51), Max: 37.1 (01-22-25 @ 20:00)  T(F): 98.1 (01-23-25 @ 07:51), Max: 98.7 (01-22-25 @ 20:00)  HR: 107 (01-23-25 @ 08:53) (85 - 107)  BP: 97/64 (01-23-25 @ 08:53) (96/59 - 119/83)  BP(mean): 75 (01-23-25 @ 08:53) (69 - 99)  RR: 16 (01-23-25 @ 08:00) (12 - 20)  SpO2: 98% (01-23-25 @ 08:37) (93% - 100%)      PE:  NAD  On O2 NC  bilateral rales  abd soft, nd, nt  a/o x 3      CBC                          8.3    8.37  )-----------( 173      ( 23 Jan 2025 06:54 )             26.6                           7.5    8.60  )-----------( 156      ( 21 Jan 2025 06:58 )             25.2                             8.1    9.59  )-----------( 158      ( 20 Jan 2025 07:25 )             27.1                             8.3    8.96  )-----------( 156      ( 19 Jan 2025 07:18 )             27.8                           8.6    9.26  )-----------( 136      ( 18 Jan 2025 04:55 )             27.8                             6.4    9.75  )-----------( 130      ( 17 Jan 2025 06:45 )             20.7         Chem:       01-21    139  |  105  |  4.5[L]  ----------------------------<  132[H]  3.8   |  24.0  |  0.42[L]    Ca    8.0[L]      21 Jan 2025 06:58    TPro  5.1[L]  /  Alb  2.3[L]  /  TBili  0.5  /  DBili  x   /  AST  19  /  ALT  12  /  AlkPhos  140[H]  01-21 01-20    138  |  103  |  4.9[L]  ----------------------------<  167[H]  3.9   |  25.0  |  0.51    Ca    8.0[L]      20 Jan 2025 07:25    TPro  5.5[L]  /  Alb  2.3[L]  /  TBili  0.6  /  DBili  x   /  AST  19  /  ALT  13  /  AlkPhos  165[H]  01-20 01-17    139  |  104  |  8.7  ----------------------------<  163[H]  2.9[LL]   |  26.0  |  0.70    Ca    7.3[L]      17 Jan 2025 06:45  Phos  4.2     01-17  Mg     1.5     01-17    TPro  5.0[L]  /  Alb  2.1[L]  /  TBili  0.8  /  DBili  0.3  /  AST  17  /  ALT  13  /  AlkPhos  127[H]  01-16      01-15    138  |  102  |  12.3  ----------------------------<  135[H]  3.6   |  25.0  |  0.67    Ca    8.1[L]      15 Cristhian 2025 06:10      01-14    141  |  103  |  13.4  ----------------------------<  127[H]  3.4[L]   |  30.0[H]  |  0.54    Ca    8.1[L]      14 Jan 2025 04:48        01-13    141  |  103  |  20.3[H]  ----------------------------<  137[H]  3.8   |  27.0  |  0.47[L]    Ca    8.2[L]      13 Jan 2025 06:10

## 2025-01-24 LAB
ALBUMIN SERPL ELPH-MCNC: 2.1 G/DL — LOW (ref 3.3–5.2)
ALP SERPL-CCNC: 124 U/L — HIGH (ref 40–120)
ALT FLD-CCNC: 10 U/L — SIGNIFICANT CHANGE UP
ANION GAP SERPL CALC-SCNC: 11 MMOL/L — SIGNIFICANT CHANGE UP (ref 5–17)
AST SERPL-CCNC: 18 U/L — SIGNIFICANT CHANGE UP
BILIRUB SERPL-MCNC: 0.4 MG/DL — SIGNIFICANT CHANGE UP (ref 0.4–2)
BUN SERPL-MCNC: 5.9 MG/DL — LOW (ref 8–20)
CALCIUM SERPL-MCNC: 8 MG/DL — LOW (ref 8.4–10.5)
CHLORIDE SERPL-SCNC: 104 MMOL/L — SIGNIFICANT CHANGE UP (ref 96–108)
CO2 SERPL-SCNC: 26 MMOL/L — SIGNIFICANT CHANGE UP (ref 22–29)
CREAT SERPL-MCNC: 0.46 MG/DL — LOW (ref 0.5–1.3)
EGFR: 126 ML/MIN/1.73M2 — SIGNIFICANT CHANGE UP
GLUCOSE BLDC GLUCOMTR-MCNC: 118 MG/DL — HIGH (ref 70–99)
GLUCOSE BLDC GLUCOMTR-MCNC: 140 MG/DL — HIGH (ref 70–99)
GLUCOSE BLDC GLUCOMTR-MCNC: 143 MG/DL — HIGH (ref 70–99)
GLUCOSE BLDC GLUCOMTR-MCNC: 151 MG/DL — HIGH (ref 70–99)
GLUCOSE SERPL-MCNC: 198 MG/DL — HIGH (ref 70–99)
HCT VFR BLD CALC: 24.6 % — LOW (ref 34.5–45)
HGB BLD-MCNC: 7.4 G/DL — LOW (ref 11.5–15.5)
MCHC RBC-ENTMCNC: 29 PG — SIGNIFICANT CHANGE UP (ref 27–34)
MCHC RBC-ENTMCNC: 30.1 G/DL — LOW (ref 32–36)
MCV RBC AUTO: 96.5 FL — SIGNIFICANT CHANGE UP (ref 80–100)
PLATELET # BLD AUTO: 142 K/UL — LOW (ref 150–400)
POTASSIUM SERPL-MCNC: 3.5 MMOL/L — SIGNIFICANT CHANGE UP (ref 3.5–5.3)
POTASSIUM SERPL-SCNC: 3.5 MMOL/L — SIGNIFICANT CHANGE UP (ref 3.5–5.3)
PROT SERPL-MCNC: 4.8 G/DL — LOW (ref 6.6–8.7)
RBC # BLD: 2.55 M/UL — LOW (ref 3.8–5.2)
RBC # FLD: 25 % — HIGH (ref 10.3–14.5)
SODIUM SERPL-SCNC: 141 MMOL/L — SIGNIFICANT CHANGE UP (ref 135–145)
WBC # BLD: 5.25 K/UL — SIGNIFICANT CHANGE UP (ref 3.8–10.5)
WBC # FLD AUTO: 5.25 K/UL — SIGNIFICANT CHANGE UP (ref 3.8–10.5)

## 2025-01-24 PROCEDURE — G0545: CPT

## 2025-01-24 PROCEDURE — 99233 SBSQ HOSP IP/OBS HIGH 50: CPT

## 2025-01-24 PROCEDURE — 99232 SBSQ HOSP IP/OBS MODERATE 35: CPT

## 2025-01-24 RX ORDER — LIDOCAINE HYDROCHLORIDE 30 MG/G
1 CREAM TOPICAL DAILY
Refills: 0 | Status: DISCONTINUED | OUTPATIENT
Start: 2025-01-24 | End: 2025-01-31

## 2025-01-24 RX ORDER — FLUOXETINE HCL 10 MG
80 CAPSULE ORAL ONCE
Refills: 0 | Status: COMPLETED | OUTPATIENT
Start: 2025-01-24 | End: 2025-01-24

## 2025-01-24 RX ORDER — HYDROMORPHONE HYDROCHLORIDE 4 MG/ML
30 INJECTION, SOLUTION INTRAMUSCULAR; INTRAVENOUS; SUBCUTANEOUS
Refills: 0 | Status: DISCONTINUED | OUTPATIENT
Start: 2025-01-24 | End: 2025-01-29

## 2025-01-24 RX ADMIN — DEXTROMETHORPHAN HBR AND GUAIFENESIN ORAL SOLUTION 10 MILLILITER(S): 10; 100 LIQUID ORAL at 22:56

## 2025-01-24 RX ADMIN — LIDOCAINE HYDROCHLORIDE 1 PATCH: 30 CREAM TOPICAL at 16:51

## 2025-01-24 RX ADMIN — HYDROMORPHONE HYDROCHLORIDE 30 MILLILITER(S): 4 INJECTION, SOLUTION INTRAMUSCULAR; INTRAVENOUS; SUBCUTANEOUS at 07:35

## 2025-01-24 RX ADMIN — Medication 1.25 MILLIGRAM(S): at 03:26

## 2025-01-24 RX ADMIN — NAFCILLIN INJECTION 200 GRAM(S): 2 POWDER, FOR SOLUTION INTRAMUSCULAR; INTRAMUSCULAR; INTRAVENOUS at 18:18

## 2025-01-24 RX ADMIN — PANTOPRAZOLE 40 MILLIGRAM(S): 20 TABLET, DELAYED RELEASE ORAL at 05:43

## 2025-01-24 RX ADMIN — Medication 1: at 10:22

## 2025-01-24 RX ADMIN — OCTREOTIDE ACETATE 100 MICROGRAM(S): 1000 INJECTION INTRAVENOUS; SUBCUTANEOUS at 05:44

## 2025-01-24 RX ADMIN — DEXTROSE MONOHYDRATE, SODIUM CHLORIDE, AND POTASSIUM CHLORIDE 100 MILLILITER(S): 50; 2.25; 2.24 INJECTION, SOLUTION INTRAVENOUS at 00:25

## 2025-01-24 RX ADMIN — HYDROMORPHONE HYDROCHLORIDE 0.5 MILLIGRAM(S): 4 INJECTION, SOLUTION INTRAMUSCULAR; INTRAVENOUS; SUBCUTANEOUS at 20:50

## 2025-01-24 RX ADMIN — AMINOCAPROIC ACID 4 GRAM(S): 1000 TABLET ORAL at 05:42

## 2025-01-24 RX ADMIN — NAFCILLIN INJECTION 200 GRAM(S): 2 POWDER, FOR SOLUTION INTRAMUSCULAR; INTRAMUSCULAR; INTRAVENOUS at 14:33

## 2025-01-24 RX ADMIN — Medication 1.25 MILLIGRAM(S): at 14:10

## 2025-01-24 RX ADMIN — Medication 1.25 MILLIGRAM(S): at 20:36

## 2025-01-24 RX ADMIN — PREDNISONE 20 MILLIGRAM(S): 5 TABLET ORAL at 05:43

## 2025-01-24 RX ADMIN — OCTREOTIDE ACETATE 100 MICROGRAM(S): 1000 INJECTION INTRAVENOUS; SUBCUTANEOUS at 22:23

## 2025-01-24 RX ADMIN — MEMANTINE HYDROCHLORIDE 5 MILLIGRAM(S): 7 CAPSULE, EXTENDED RELEASE ORAL at 22:56

## 2025-01-24 RX ADMIN — HYDROMORPHONE HYDROCHLORIDE 30 MILLILITER(S): 4 INJECTION, SOLUTION INTRAMUSCULAR; INTRAVENOUS; SUBCUTANEOUS at 19:14

## 2025-01-24 RX ADMIN — LIDOCAINE HYDROCHLORIDE 1 PATCH: 30 CREAM TOPICAL at 19:57

## 2025-01-24 RX ADMIN — NAFCILLIN INJECTION 200 GRAM(S): 2 POWDER, FOR SOLUTION INTRAMUSCULAR; INTRAMUSCULAR; INTRAVENOUS at 21:59

## 2025-01-24 RX ADMIN — AMINOCAPROIC ACID 4 GRAM(S): 1000 TABLET ORAL at 22:56

## 2025-01-24 RX ADMIN — AMINOCAPROIC ACID 4 GRAM(S): 1000 TABLET ORAL at 16:50

## 2025-01-24 RX ADMIN — Medication 1 APPLICATION(S): at 18:34

## 2025-01-24 RX ADMIN — Medication 4 MILLILITER(S): at 03:26

## 2025-01-24 RX ADMIN — ACETAMINOPHEN, DIPHENHYDRAMINE HCL, PHENYLEPHRINE HCL 3 MILLIGRAM(S): 325; 25; 5 TABLET ORAL at 21:58

## 2025-01-24 RX ADMIN — Medication 1 APPLICATION(S): at 05:54

## 2025-01-24 RX ADMIN — HYDROMORPHONE HYDROCHLORIDE 0.5 MILLIGRAM(S): 4 INJECTION, SOLUTION INTRAMUSCULAR; INTRAVENOUS; SUBCUTANEOUS at 15:20

## 2025-01-24 RX ADMIN — OLANZAPINE 2.5 MILLIGRAM(S): 10 TABLET, FILM COATED ORAL at 22:56

## 2025-01-24 RX ADMIN — METHADONE HYDROCHLORIDE 10 MILLIGRAM(S): 5 SOLUTION ORAL at 21:58

## 2025-01-24 RX ADMIN — HYDROMORPHONE HYDROCHLORIDE 0.5 MILLIGRAM(S): 4 INJECTION, SOLUTION INTRAMUSCULAR; INTRAVENOUS; SUBCUTANEOUS at 14:42

## 2025-01-24 RX ADMIN — NAFCILLIN INJECTION 200 GRAM(S): 2 POWDER, FOR SOLUTION INTRAMUSCULAR; INTRAMUSCULAR; INTRAVENOUS at 10:22

## 2025-01-24 RX ADMIN — PREGABALIN CAPSULES, CV 200 MILLIGRAM(S): 225 CAPSULE ORAL at 21:58

## 2025-01-24 RX ADMIN — PREGABALIN CAPSULES, CV 200 MILLIGRAM(S): 225 CAPSULE ORAL at 05:43

## 2025-01-24 RX ADMIN — PANTOPRAZOLE 40 MILLIGRAM(S): 20 TABLET, DELAYED RELEASE ORAL at 18:18

## 2025-01-24 RX ADMIN — HYDROMORPHONE HYDROCHLORIDE 30 MILLILITER(S): 4 INJECTION, SOLUTION INTRAMUSCULAR; INTRAVENOUS; SUBCUTANEOUS at 11:45

## 2025-01-24 RX ADMIN — PREGABALIN CAPSULES, CV 200 MILLIGRAM(S): 225 CAPSULE ORAL at 14:33

## 2025-01-24 RX ADMIN — Medication 4 MILLILITER(S): at 20:37

## 2025-01-24 RX ADMIN — Medication 1 APPLICATION(S): at 11:56

## 2025-01-24 RX ADMIN — NAFCILLIN INJECTION 200 GRAM(S): 2 POWDER, FOR SOLUTION INTRAMUSCULAR; INTRAMUSCULAR; INTRAVENOUS at 06:35

## 2025-01-24 RX ADMIN — ANTISEPTIC SURGICAL SCRUB 1 APPLICATION(S): 0.04 SOLUTION TOPICAL at 11:57

## 2025-01-24 RX ADMIN — MEMANTINE HYDROCHLORIDE 10 MILLIGRAM(S): 7 CAPSULE, EXTENDED RELEASE ORAL at 10:22

## 2025-01-24 RX ADMIN — METHYLPHENIDATE HYDROCHLORIDE 20 MILLIGRAM(S): 36 TABLET, EXTENDED RELEASE ORAL at 10:22

## 2025-01-24 RX ADMIN — NAFCILLIN INJECTION 200 GRAM(S): 2 POWDER, FOR SOLUTION INTRAMUSCULAR; INTRAMUSCULAR; INTRAVENOUS at 03:12

## 2025-01-24 RX ADMIN — Medication 4 MILLILITER(S): at 14:09

## 2025-01-24 RX ADMIN — HYDROMORPHONE HYDROCHLORIDE 30 MILLILITER(S): 4 INJECTION, SOLUTION INTRAMUSCULAR; INTRAVENOUS; SUBCUTANEOUS at 17:13

## 2025-01-24 RX ADMIN — DEXTROSE MONOHYDRATE, SODIUM CHLORIDE, AND POTASSIUM CHLORIDE 100 MILLILITER(S): 50; 2.25; 2.24 INJECTION, SOLUTION INTRAVENOUS at 11:57

## 2025-01-24 RX ADMIN — Medication 80 MILLIGRAM(S): at 23:03

## 2025-01-24 RX ADMIN — AMINOCAPROIC ACID 4 GRAM(S): 1000 TABLET ORAL at 11:56

## 2025-01-24 RX ADMIN — Medication 100 MILLIGRAM(S): at 21:58

## 2025-01-24 RX ADMIN — HYDROMORPHONE HYDROCHLORIDE 0.5 MILLIGRAM(S): 4 INJECTION, SOLUTION INTRAMUSCULAR; INTRAVENOUS; SUBCUTANEOUS at 19:50

## 2025-01-24 NOTE — PROGRESS NOTE ADULT - ASSESSMENT
39y  Female with h/o metastatic breast cancer ER/[place] Neg, HER-2 + on chemotherapy (last dose 12/26/24) (mets to lung, liver, spleen, spine, bone and brain), gastritis w/ intermittent episodes of dark stool (follows w/ Dr. Rosado GI and is on PPI and octreotide daily). Patient presented 1/5 with c/o worsening SOB.  She was seen in ED 1/2/25 for for weakness and SOB at which time she was found to have Hb of 5 requiring PRBC transfusion and positive for Flu A and started on tamiflu and was discharged from the ED on 1/3/25.  Her respiratory status has worsened since then w/ productive cough associated with body aches and chills.  Denies sick contacts but has had many frequent hospital visits. Patient has been afebrile, no leukocytosis. Was placed on BIPAP for Worsening respiratory status. continued on Tamiflu. Vancomycn and Zosyn was added. Blood cultures with MSSA. ID input requested.       MSSA bacteremia   Staphylococcus aureus PNA   Influenza A   metastatic breast cancer ER/[place] Neg, HER-2 + on chemotherapy   Port in place s/p explant 1/10/25  Stool PCR + Norovirus and Giardia       - Blood cultures 1/5, 1/7, 1/9 reporting MSSA    - Repeat blood cultures 1/10,  1/11/25, 1/17/25 no growth   - Sputum Cx 1/6 Staphylococcus aureus  - Urine Cx 1/5 reporting 10k - 49k Escherichia coli  of ? significance since UA not concerning for UTI   - Stool PCR + Norovirus and Giardia   - RVP/COVID 19 PCR + Influenza A  - S/P Port removal 1/10/25  - CTA Chest reporting No acute pulmonary embolism. Wide spread patchy airspace opacities bilaterally, increased since the prior exam.  - US RUQ reporting No evidence of acute cholecystitis or biliary ductal dilatation.  - Procalcitonin level 2.32 --> 1.37, ordered for the AM   - TTE 1/6 with no veg  - Called cardiology consult for MARYANN if possible   - GI eval noted, no plan for EGD   - s/p Port removal 1/10/25  - Patient still with dropping H/H, receiving multiple transfusion weekly  - No plan for MARYANN if no EGD is done  - If no MARYANN done will need 6 weeks of antibiotics from port explant (2/20/25)  - Completed oseltamivir  1/10/25  - Continue Nafcillin 2gm IV [id number]  - For Norovirus, continue hydration and supportive care  - For Giardia, Completed 5 days of Albendazole   - Hold off on PICC/Midline for now unless needed for reasons other than infectious diseases  - Follow up cultures  - Trend Fever  - Trend WBC      Thank you for allowing me to participate in the care of your patient.   Will Follow    Discussed treatment plan with:  Clinical pharmacy, Jessica SEQUEIRA/Dr Rosado

## 2025-01-24 NOTE — PROVIDER CONTACT NOTE (OTHER) - SITUATION
Critical potassium of 2.9
PA made aware of abg results and pt status. Pt appears tachypneic, labored with increase work of breathing. Pt currently on 3L nc spo2 97%
Patient refusing to wear the SCDs/VC boots.
Pt. ordered for b/l intermittent pneumatic compression but refused to wear them. States she is in and out of bed often to use the commode.
patient had bright red bloody bowel movement x1

## 2025-01-24 NOTE — PROGRESS NOTE ADULT - ASSESSMENT
40 yo F with h/o metastatic breast cancer ER/[place] Neg, HER-2 + on chemotherapy (last dose 12/26/24) (mets to lung, liver, spleen, spine, bone and brain).     Patient reports her pain to be moderately controlled on current medications. She has been using dPCA, but then running short due to the max 4 hour limit. Patient denies sedation with current medications.    Recommendations:      - dPCA 0/0.3/8/10 (Will decrease demand dose from 0.4 to 0.3)   - Dilaudid 0.5mg IVP q3h prn severe BTP ONLY. Hold for sedation or unstable vitals.   - Add lidocaine   Patch 12 hours on, 12 hours off. Max 3 patches on at one time   - Continue Methadone 5mg po bid at 0600 and 1400   - Continue Methadone 10mg po qHS   - Continue home Lyrica 200mg po q8h        When due for discharge:   - DC Dilaudid PCA   - Recommend starting Dilaudid PO 4/8mg q4h PRN mod/severe pain     40 yo F with h/o metastatic breast cancer ER/[place] Neg, HER-2 + on chemotherapy (last dose 12/26/24) (mets to lung, liver, spleen, spine, bone and brain).     Patient reports her pain to be moderately controlled on current medications. She has been using dPCA, but then running short due to the max 4 hour limit. Patient denies sedation with current medications.    Recommendations:      - dPCA 0/0.3/8/8 (Will decrease demand dose from 0.4mg to 0.3mg & decrease 4hr max dose from 10mg to 8mg)   - Dilaudid 0.5mg IVP q3h prn severe BTP ONLY. Hold for sedation or unstable vitals.   - Add lidocaine   Patch 12 hours on, 12 hours off. Max 3 patches on at one time   - Continue Methadone 5mg po bid at 0600 and 1400   - Continue Methadone 10mg po qHS   - Continue home Lyrica 200mg po q8h        When due for discharge:   - DC Dilaudid PCA   - Recommend starting Dilaudid PO 4/8mg q4h PRN mod/severe pain

## 2025-01-24 NOTE — PROGRESS NOTE ADULT - SUBJECTIVE AND OBJECTIVE BOX
[facility]  INFECTIOUS DISEASES at [place] / [place] / [place]  =======================================================                              Salazar Toro MD                              Professor Emeritus:  Dr Warner Mcintosh MD            Diplomates American Board of Internal Medicine & Infectious Diseases                                   Tel  [phone] Fax [phone]                                  Hospital Consult line:  [phone]  =======================================================      [place] [id number]    Follow up: MSSA bacteremia    No fevers   Diarrhea improved       Allergies:  pertuzumab (Other (Severe))  Perjeta (Other (Severe))        REVIEW OF SYSTEMS:  CONSTITUTIONAL:  No Fever or chills  HEENT:   No diplopia or blurred vision.  No earache, sore throat or runny nose.  CARDIOVASCULAR:  No Chest Pain  RESPIRATORY:  No cough, shortness of breath  GASTROINTESTINAL:  No nausea, vomiting + diarrhea.  GENITOURINARY:  No dysuria, frequency or urgency. No Blood in urine  MUSCULOSKELETAL:  no joint aches, no muscle pain  SKIN:  No change in skin, hair or nails.  NEUROLOGIC:  No Headaches      Physical Exam:  GEN: NAD  HEENT: normocephalic and atraumatic.    NECK: Supple.   LUNGS: CTA B/L.  HEART: RRR  ABDOMEN: Soft, NT, ND.  +BS.    : No CVA tenderness  EXTREMITIES: Without  edema.  MSK: No joint swelling  NEUROLOGIC: No Focal Deficits   SKIN: No rash      Vitals:  T(F): 97.9 (24 Jan 2025 07:54), Max: 98.2 (23 Jan 2025 12:15)  HR: 78 (24 Jan 2025 08:00)  BP: 102/61 (24 Jan 2025 08:00)  RR: 19 (24 Jan 2025 08:00)  SpO2: 99% (24 Jan 2025 08:00) (96% - 100%)  temp max in last 48H T(F): , Max: 98.7 (01-22-25 @ 20:00)    Current Antibiotics:  nafcillin  IVPB 2 Gram(s) IV Intermittent every 4 hours    Other medications:  acetylcysteine 10%  Inhalation 4 milliLiter(s) Inhalation every 6 hours  aminocaproic acid Tablet 4 Gram(s) Oral every 6 hours  chlorhexidine 2% Cloths 1 Application(s) Topical daily  dextrose 5%. 1000 milliLiter(s) IV Continuous <Continuous>  dextrose 5%. 1000 milliLiter(s) IV Continuous <Continuous>  dextrose 50% Injectable 25 Gram(s) IV Push once  dextrose 50% Injectable 25 Gram(s) IV Push once  dextrose 50% Injectable 12.5 Gram(s) IV Push once  FLUoxetine Solution 80 milliGRAM(s) Oral at bedtime  glucagon  Injectable 1 milliGRAM(s) IntraMuscular once  hydrocortisone 1% Cream 1 Application(s) Topical daily  hydrocortisone 2.5% Rectal Cream 1 Application(s) Rectal two times a day  HYDROmorphone PCA (1 mG/mL) 30 milliLiter(s) PCA Continuous PCA Continuous  insulin lispro (ADMELOG) corrective regimen sliding scale   SubCutaneous three times a day before meals  levalbuterol Inhalation 1.25 milliGRAM(s) Inhalation every 6 hours  memantine 5 milliGRAM(s) Oral at bedtime  memantine 10 milliGRAM(s) Oral with breakfast  methadone    Tablet 10 milliGRAM(s) Oral at bedtime  methylphenidate 20 milliGRAM(s) Oral <User Schedule>  octreotide  Injectable 100 MICROGram(s) SubCutaneous two times a day  OLANZapine 2.5 milliGRAM(s) Oral at bedtime  pantoprazole  Injectable 40 milliGRAM(s) IV Push every 12 hours  predniSONE   Tablet 20 milliGRAM(s) Oral daily  pregabalin 200 milliGRAM(s) Oral every 8 hours  sodium chloride 0.9% with potassium chloride 20 mEq/L 1000 milliLiter(s) IV Continuous <Continuous>                            7.4    5.25  )-----------( 142      ( 24 Jan 2025 06:15 )             24.6     01-24    141  |  104  |  5.9[L]  ----------------------------<  198[H]  3.5   |  26.0  |  0.46[L]    Ca    8.0[L]      24 Jan 2025 06:15    TPro  4.8[L]  /  Alb  2.1[L]  /  TBili  0.4  /  DBili  x   /  AST  18  /  ALT  10  /  AlkPhos  124[H]  01-24    RECENT CULTURES:  01-17 @ 13:02 .Blood BLOOD     No growth at 5 days    01-17 @ 12:58 .Blood BLOOD     No growth at 5 days    01-11 @ 05:25 .Blood BLOOD     No growth at 5 days    01-11 @ 05:20 .Blood BLOOD     No growth at 5 days    01-10 @ 18:20 .Surgical Swab     No growth at 5 days    01-10 @ 12:20 .Blood BLOOD     No growth at 5 days      WBC Count: 5.25 K/uL (01-24-25 @ 06:15)  WBC Count: 8.37 K/uL (01-23-25 @ 06:54)  WBC Count: 9.45 K/uL (01-22-25 @ 23:42)  WBC Count: 6.30 K/uL (01-22-25 @ 07:38)  WBC Count: 8.60 K/uL (01-21-25 @ 06:58)  WBC Count: 9.59 K/uL (01-20-25 @ 07:25)  WBC Count: 9.09 K/uL (01-20-25 @ 00:44)    Creatinine: 0.46 mg/dL (01-24-25 @ 06:15)  Creatinine: 0.47 mg/dL (01-23-25 @ 06:54)  Creatinine: 0.49 mg/dL (01-22-25 @ 07:38)  Creatinine: 0.42 mg/dL (01-21-25 @ 06:58)  Creatinine: 0.51 mg/dL (01-20-25 @ 07:25)    Procalcitonin: 0.15 ng/mL (01-21-25 @ 06:58)     SARS-CoV-2: NotDetec (01-05-25 @ 10:54)  SARS-CoV-2 Result: NotDetec (01-02-25 @ 19:20)            Influenza AH3 (RapRVP): Detected (01.05.25 @ 10:54)    Urinalysis + Microscopic Examination (01.06.25 @ 03:00)    pH Urine: 6.0   Urine Appearance: Clear   Color: Yellow   Specific Gravity: 1.015   Protein, Urine: Trace mg/dL   Glucose Qualitative, Urine: Negative mg/dL   Ketone - Urine: Negative mg/dL   Blood, Urine: Small   Bilirubin: Negative   Urobilinogen: 1.0 mg/dL   Leukocyte Esterase Concentration: Small   Nitrite: Negative   White Blood Cell - Urine: 6 /HPF   Red Blood Cell - Urine: 5 /HPF   Bacteria: Occasional /HPF   Epithelial Cells: 2 /HPF      GI PCR Panel Stool (01.15.25 @ 06:15)    GI PCR Panel: Detected   Giardia lamblia: Detected   Norovirus GI/GII: Detected        < from: TTE Limited W or WO Ultrasound Enhancing Agent (01.06.25 @ 12:55) >  CONCLUSIONS:      1. Technically difficult image quality.   2. Left ventricular systolic function is normal with an ejection fraction visually estimated at 65 to 70 %.   3. Normal right ventricular cavity size, with normal wall thickness, and normal right ventricular systolic function.   4. Compared to the transthoracic echocardiogram performed on 7/21/2024, there have been no significant interval changes.    < end of copied text >

## 2025-01-24 NOTE — PROGRESS NOTE ADULT - SUBJECTIVE AND OBJECTIVE BOX
[facility] Division of Hospital Medicine    SUBJECTIVE / OVERNIGHT EVENTS:  Patient seen and examined at bedside. She was initially seen sleeping but upon being woken up she says that she is feeling okay and that her pain is controlled for now. Overall patient seems very sedated from pain medications. As per nursing staff, she is maximizing pain medications and has even requested pain medications and fallen asleep before she even gets it. Denies chest pain, shortness of breath, palpitations or other acute symptoms. Hematology following for anemia. Cardiology following for possible MARYANN.    MEDICATIONS  (STANDING):  acetylcysteine 10%  Inhalation 4 milliLiter(s) Inhalation every 6 hours  aminocaproic acid Tablet 4 Gram(s) Oral every 6 hours  chlorhexidine 2% Cloths 1 Application(s) Topical daily  dextrose 5%. 1000 milliLiter(s) (50 mL/Hr) IV Continuous <Continuous>  dextrose 5%. 1000 milliLiter(s) (100 mL/Hr) IV Continuous <Continuous>  dextrose 50% Injectable 25 Gram(s) IV Push once  dextrose 50% Injectable 12.5 Gram(s) IV Push once  dextrose 50% Injectable 25 Gram(s) IV Push once  FLUoxetine Solution 80 milliGRAM(s) Oral at bedtime  glucagon  Injectable 1 milliGRAM(s) IntraMuscular once  hydrocortisone 1% Cream 1 Application(s) Topical daily  hydrocortisone 2.5% Rectal Cream 1 Application(s) Rectal two times a day  HYDROmorphone PCA (1 mG/mL) 30 milliLiter(s) PCA Continuous PCA Continuous  insulin lispro (ADMELOG) corrective regimen sliding scale   SubCutaneous three times a day before meals  levalbuterol Inhalation 1.25 milliGRAM(s) Inhalation every 6 hours  memantine 5 milliGRAM(s) Oral at bedtime  memantine 10 milliGRAM(s) Oral with breakfast  methadone    Tablet 10 milliGRAM(s) Oral at bedtime  methadone    Tablet 5 milliGRAM(s) Oral <User Schedule>  methylphenidate 20 milliGRAM(s) Oral <User Schedule>  nafcillin  IVPB 2 Gram(s) IV Intermittent every 4 hours  octreotide  Injectable 100 MICROGram(s) SubCutaneous two times a day  OLANZapine 2.5 milliGRAM(s) Oral at bedtime  pantoprazole  Injectable 40 milliGRAM(s) IV Push every 12 hours  potassium chloride    Tablet ER 40 milliEquivalent(s) Oral once  predniSONE   Tablet 20 milliGRAM(s) Oral daily  pregabalin 200 milliGRAM(s) Oral every 8 hours  sodium chloride 0.9% with potassium chloride 20 mEq/L 1000 milliLiter(s) (100 mL/Hr) IV Continuous <Continuous>    MEDICATIONS  (PRN):  acetaminophen     Tablet .. 650 milliGRAM(s) Oral every 6 hours PRN Temp greater or equal to 38C (100.4F), Mild Pain (1 - 3)  aluminum hydroxide/magnesium hydroxide/simethicone Suspension 30 milliLiter(s) Oral every 4 hours PRN Dyspepsia  benzonatate 100 milliGRAM(s) Oral every 8 hours PRN Cough  dextrose Oral Gel 15 Gram(s) Oral once PRN Blood Glucose LESS THAN 70 milliGRAM(s)/deciliter  guaifenesin/dextromethorphan Oral Liquid 10 milliLiter(s) Oral every 4 hours PRN Cough  HYDROmorphone  Injectable 0.5 milliGRAM(s) IV Push every 3 hours PRN Severe Pain (7 - 10)  melatonin 3 milliGRAM(s) Oral at bedtime PRN Insomnia  naloxone Injectable 0.1 milliGRAM(s) IV Push every 3 minutes PRN For ANY of the following changes in patient status:  A. RR LESS THAN 10 breaths per minute, B. Oxygen saturation LESS THAN 90%, C. Sedation score of 6  ondansetron Injectable 4 milliGRAM(s) IV Push every 8 hours PRN Nausea and/or Vomiting  sodium chloride 0.65% Nasal 1 Spray(s) Both Nostrils five times a day PRN Nasal Congestion        I&O's Summary    22 Jan 2025 07:01  -  23 Jan 2025 07:00  --------------------------------------------------------  IN: 2150 mL / OUT: 0 mL / NET: 2150 mL    23 Jan 2025 07:01  -  23 Jan 2025 13:51  --------------------------------------------------------  IN: 700 mL / OUT: 1300 mL / NET: -600 mL        PHYSICAL EXAM:  Vital Signs Last 24 Hrs  T(C): 36.8 (23 Jan 2025 12:15), Max: 37.1 (22 Jan 2025 20:00)  T(F): 98.2 (23 Jan 2025 12:15), Max: 98.7 (22 Jan 2025 20:00)  HR: 93 (23 Jan 2025 12:00) (85 - 108)  BP: 122/67 (23 Jan 2025 12:00) (96/59 - 122/67)  BP(mean): 84 (23 Jan 2025 12:00) (69 - 99)  RR: 17 (23 Jan 2025 12:00) (12 - 20)  SpO2: 97% (23 Jan 2025 12:00) (93% - 100%)    Parameters below as of 23 Jan 2025 12:00  Patient On (Oxygen Delivery Method): nasal cannula  O2 Flow (L/min): 2          CONSTITUTIONAL: NAD, well-groomed  ENMT: Moist oral mucosa, no pharyngeal injection or exudates; normal dentition  RESPIRATORY: Normal respiratory effort; lungs are clear to auscultation bilaterally  CARDIOVASCULAR: Regular rate and rhythm, normal S1 and S2, no murmur/rub/gallop; No lower extremity edema; Peripheral pulses are 2+ bilaterally  ABDOMEN: Nontender to palpation, normoactive bowel sounds, no rebound/guarding; No hepatosplenomegaly  MUSCLOSKELETAL:  2+ le edema present bilaterally  PSYCH: A+O to person, place, and time; affect appropriate  NEUROLOGY: CN 2-12 are intact and symmetric; no gross sensory deficits;   SKIN: No rashes; no palpable lesions    LABS:                        8.3    8.37  )-----------( 173      ( 23 Jan 2025 06:54 )             26.6     01-23    138  |  104  |  4.8[L]  ----------------------------<  97  3.6   |  23.0  |  0.47[L]    Ca    8.2[L]      23 Jan 2025 06:54    TPro  5.5[L]  /  Alb  2.6[L]  /  TBili  0.5  /  DBili  x   /  AST  23  /  ALT  13  /  AlkPhos  135[H]  01-23          Urinalysis Basic - ( 23 Jan 2025 06:54 )    Color: x / Appearance: x / SG: x / pH: x  Gluc: 97 mg/dL / Ketone: x  / Bili: x / Urobili: x   Blood: x / Protein: x / Nitrite: x   Leuk Esterase: x / RBC: x / WBC x   Sq Epi: x / Non Sq Epi: x / Bacteria: x        CAPILLARY BLOOD GLUCOSE      POCT Blood Glucose.: 168 mg/dL (23 Jan 2025 13:02)  POCT Blood Glucose.: 120 mg/dL (23 Jan 2025 08:53)  POCT Blood Glucose.: 111 mg/dL (22 Jan 2025 18:00)

## 2025-01-24 NOTE — PROGRESS NOTE ADULT - SUBJECTIVE AND OBJECTIVE BOX
Interval Hx:  Patient seen during rounds  Patient reports pain to be controlled on current medications  Patient denies sedation with medications        Analgesic Dosing for past 24 hours reviewed as below:    FLUoxetine Solution   80 milliGRAM(s) Oral (01-23-25 @ 21:27)    HYDROmorphone  Injectable   0.5 milliGRAM(s) IV Push (01-23-25 @ 20:42)   0.5 milliGRAM(s) IV Push (01-23-25 @ 15:58)    melatonin   3 milliGRAM(s) Oral (01-23-25 @ 21:23)    memantine   5 milliGRAM(s) Oral (01-23-25 @ 21:25)    memantine   10 milliGRAM(s) Oral (01-24-25 @ 10:22)    methadone    Tablet   10 milliGRAM(s) Oral (01-23-25 @ 21:23)    methadone    Tablet   5 milliGRAM(s) Oral (01-23-25 @ 13:57)    methylphenidate   20 milliGRAM(s) Oral (01-24-25 @ 10:22)    OLANZapine   2.5 milliGRAM(s) Oral (01-23-25 @ 21:24)    pregabalin   200 milliGRAM(s) Oral (01-24-25 @ 05:43)   200 milliGRAM(s) Oral (01-23-25 @ 21:24)   200 milliGRAM(s) Oral (01-23-25 @ 13:57)          T(C): 36.6 (01-24-25 @ 07:54), Max: 36.8 (01-23-25 @ 12:15)  HR: 78 (01-24-25 @ 08:00) (72 - 102)  BP: 102/61 (01-24-25 @ 08:00) (94/59 - 122/67)  RR: 19 (01-24-25 @ 08:00) (16 - 19)  SpO2: 99% (01-24-25 @ 08:00) (96% - 100%)      01-23-25 @ 07:01  -  01-24-25 @ 07:00  --------------------------------------------------------  IN: 2400 mL / OUT: 4400 mL / NET: -2000 mL        acetaminophen     Tablet .. 650 milliGRAM(s) Oral every 6 hours PRN  acetylcysteine 10%  Inhalation 4 milliLiter(s) Inhalation every 6 hours  aluminum hydroxide/magnesium hydroxide/simethicone Suspension 30 milliLiter(s) Oral every 4 hours PRN  aminocaproic acid Tablet 4 Gram(s) Oral every 6 hours  benzonatate 100 milliGRAM(s) Oral every 8 hours PRN  chlorhexidine 2% Cloths 1 Application(s) Topical daily  dextrose 5%. 1000 milliLiter(s) IV Continuous <Continuous>  dextrose 5%. 1000 milliLiter(s) IV Continuous <Continuous>  dextrose 50% Injectable 25 Gram(s) IV Push once  dextrose 50% Injectable 25 Gram(s) IV Push once  dextrose 50% Injectable 12.5 Gram(s) IV Push once  dextrose Oral Gel 15 Gram(s) Oral once PRN  FLUoxetine Solution 80 milliGRAM(s) Oral at bedtime  glucagon  Injectable 1 milliGRAM(s) IntraMuscular once  guaifenesin/dextromethorphan Oral Liquid 10 milliLiter(s) Oral every 4 hours PRN  hydrocortisone 1% Cream 1 Application(s) Topical daily  hydrocortisone 2.5% Rectal Cream 1 Application(s) Rectal two times a day  HYDROmorphone  Injectable 0.5 milliGRAM(s) IV Push every 3 hours PRN  HYDROmorphone PCA (1 mG/mL) 30 milliLiter(s) PCA Continuous PCA Continuous  insulin lispro (ADMELOG) corrective regimen sliding scale   SubCutaneous three times a day before meals  levalbuterol Inhalation 1.25 milliGRAM(s) Inhalation every 6 hours  melatonin 3 milliGRAM(s) Oral at bedtime PRN  memantine 5 milliGRAM(s) Oral at bedtime  memantine 10 milliGRAM(s) Oral with breakfast  methadone    Tablet 10 milliGRAM(s) Oral at bedtime  methylphenidate 20 milliGRAM(s) Oral <User Schedule>  nafcillin  IVPB 2 Gram(s) IV Intermittent every 4 hours  naloxone Injectable 0.1 milliGRAM(s) IV Push every 3 minutes PRN  octreotide  Injectable 100 MICROGram(s) SubCutaneous two times a day  OLANZapine 2.5 milliGRAM(s) Oral at bedtime  ondansetron Injectable 4 milliGRAM(s) IV Push every 8 hours PRN  pantoprazole  Injectable 40 milliGRAM(s) IV Push every 12 hours  predniSONE   Tablet 20 milliGRAM(s) Oral daily  pregabalin 200 milliGRAM(s) Oral every 8 hours  sodium chloride 0.65% Nasal 1 Spray(s) Both Nostrils five times a day PRN  sodium chloride 0.9% with potassium chloride 20 mEq/L 1000 milliLiter(s) IV Continuous <Continuous>                          7.4    5.25  )-----------( 142      ( 24 Jan 2025 06:15 )             24.6     01-24    141  |  104  |  5.9[L]  ----------------------------<  198[H]  3.5   |  26.0  |  0.46[L]    Ca    8.0[L]      24 Jan 2025 06:15    TPro  4.8[L]  /  Alb  2.1[L]  /  TBili  0.4  /  DBili  x   /  AST  18  /  ALT  10  /  AlkPhos  124[H]  01-24      Urinalysis Basic - ( 24 Jan 2025 06:15 )    Color: x / Appearance: x / SG: x / pH: x  Gluc: 198 mg/dL / Ketone: x  / Bili: x / Urobili: x   Blood: x / Protein: x / Nitrite: x   Leuk Esterase: x / RBC: x / WBC x   Sq Epi: x / Non Sq Epi: x / Bacteria: x        Pain Service   [phone]

## 2025-01-24 NOTE — PROGRESS NOTE ADULT - ASSESSMENT
39y  Female with h/o metastatic breast cancer ER/MS Neg, HER-2 + on chemotherapy (last dose 12/26/24) (mets to lung, liver, spleen, spine, bone and brain), gastritis w/ intermittent episodes of dark stool (follows w/ Dr. Rosado GI and is on PPI and octreotide daily). Patient presented 1/5 with c/o worsening SOB.  She was seen in ED 1/2/25 for for weakness and SOB at which time she was found to have Hb of 5 requiring PRBC transfusion and positive for Flu A and started on tamiflu and was discharged from the ED on 1/3/25.  Her respiratory status has worsened since then w/ productive cough associated with body aches and chills.  Denies sick contacts but has had many frequent hospital visits. Patient has been afebrile, no leukocytosis. Was placed on BIPAP for Worsening respiratory status. continued on Tamiflu. Vancomycn and Zosyn was added. Blood cultures with MSSA.     Metastatic breast cancer   - last dose of trastuzumab was on 12/26/24.  -All treatment on hold at the moment.  -Follow up with primary oncologist, Dr. Chapa after discharge    Cytopenias   - secondary to malignancy and acute illness and now GIB  - S/P multiple transfusions  - GI evaluated for large bloody bowel movement  Suspect rectal bleeding is due to diverticular bleed which are self-limiting - If patient becomes hemodynamically unstable, requiring multiple transfusions,   - CT A/P No evidence of active intraluminal extravasation of contrast.   - GI f/u noted.  no plans for scope.  Previously scoped in Nov. 2024. internal hemorrhoids, gastric erosions, no active bleeding site   - c/w home sub q octreotide and IV protonix  - Continue Amicar 4 grams Q 6 H for bleeding- will titrate according to HGB and bleeding. goal of minimal effective dose to prevent bleeding.   Recommend GI reeval for possible scope and treatment of hemorrhoids if feasible since bleeding now minimal   - Hgb 7.4 today,   -Transfuse if hgb<7.0.      Respiratory failure   - multifocal pneumonia and flu+  - S/P oseltamivir    - Management as per primary team.  - TTE 1/6 with no veg  - Cardiology following- . No MARYANN at this time until stabilizes and source of bleed ascertained  - feeling better    MSSA Bacteremia  - cultures persistently positive. pt afebrile.   --  ID following -On nafcillin   -- S/P Port removal 1/10/25-  PICC line prior to d/c for outpt chemo   She is concerned about access. Will place once closer to discharge.  + Noro virus and giardia lamblia  - ID following For Giardia, confirm the Giardia stool Ag  - s/p Albendazole 400mg PO q 24hours x 5 days for Giardia     Cont aggressive pain management. Plan to DC PCA pump  transition to PO pain meds   Will follow

## 2025-01-24 NOTE — PROGRESS NOTE ADULT - ASSESSMENT
39y  Female with h/o metastatic breast cancer ER/[place] Neg, HER-2 + on chemotherapy (last dose 12/26/24) (mets to lung, liver, spleen, spine, bone and brain), gastritis w/ intermittent episodes of dark stool (follows w/ Dr. Rosado GI and is on PPI and octreotide daily). Patient presented 1/5 with c/o worsening SOB.  She was seen in ED 1/2/25 for for weakness and SOB at which time she was found to have Hb of 5 requiring PRBC transfusion and positive for Flu A and started on tamiflu and was discharged from the ED on 1/3/25.  Her respiratory status has worsened since then w/ productive cough associated with body aches and chills. Admitted for sepsis ,acute hypoxic respiratory failure with flu A. s/p HFNC for worsening respiratory status. S/P course of Tamiflu. Vancomycin and Zosyn was added for possible superimposed bacterial pna. Blood cultures with MSSA.  Cardiology consultation requested for evaluation of endocarditis. Blood cultures 1/5, 1/7, 1/9 reporting MSSA , Repeat blood cultures ngtd on 1/11/25, Sputum Cx 1/6 Staphylococcus aureus. MARYANN hold for active gib requring transfusion. Pt had large BM likley diverticular bleed. Had recent scopy. Gi following. ct abd/pelvis w/iv contrast no active bleed (1/13). Urine Cx 1/5 reporting 10k - 49k Escherichia coli  of ? significance  since UA not concerning for UTI.  RVP/COVID 19 PCR + Influenza A.GI PCR  positive norovirus,Giardia.      #Acute blood loss anemia   #GIB  -s/p multiple transfusions during this hospital stay  -goal hgb of 7   -CTA abd/pelvis  no active bleed   -PPI/OCTRIO  -GI eval with no plan for scopes 2/2 recently done.  Still with bleeding.  reed GI history, ? GAVE disease per GI notes Has had multiple EGD/colonoscopies in past as well.   -spoke to GI 01/21: multiple scopes done - no sources of bleeding identified -- no plan for any endoscopy in the near future // as per GI pt will likely be transfusion dependent -- need to arrange outpatient vs inpatient transfusions  -hem/onc following   -hem/onc started Amicar on1/16  -bm with less blood     #MSSA bacteremia  - blood clx positive 1/5, repeat bl c/s (1/11) ngtd  - ID following  - Continue nafcillin (would need to be 6 weeks duration if no MARYANN done)  - Cardiology following, planning for MARYANN on 1/27    #Sepsis 2/2 Flu A w/ superimposed multifocal PNA  #Acute hypoxic respiratory failure 2/2 Flu A w/ superimposed multifocal PNA   - on NC, comfortable  - persistently positive RVP for flu A  - s/p  tamiflu   - repeat CT 1/8 shows improved pleural effusion but worsening GGOs, unclear if infectious or malignant etiology  - repeat CT 1/9 with similar findings  - repeat CT next week to evaluate for improvement of infiltrates  - po steroid tapering dose  - c/w abx per ID recs  - sputum clx growing moderate staph aureus   - TTE EF 65-70%, no WMA    #Chronic normocytic anemia 2/2 metastatic breast cancer on chemo and 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   #Thrombocytopenia likely 2/2 sepsis   #Breast cancer ER/[place] Neg, HER-2 pos on chemotherapy w/ mets to lung, liver, spleen, spine, bone and brain  - Baseline Hb ranges 8-9    - s/p prbc transfusion on 1/2, 1/8 and 1/9,1/11,1/13,1/14  - trend CBC, transfuse for hgb <7  - c/w home sub q octreotide and IV protonix  - Monitor CBC and transfuse for Hb<7 and plts<20K in setting of sepsis   - NYBC following  - monitor on tele and     #Nororvirus,Giadia gastroenteritis   -GI PCR  positive norovirus,Giadia  -Albendazole per ID (dc on 01/22 -- completed 5 day course)  -ID on board, recs noted    #Breast cancer ER/[place] Neg, HER-2 pos on chemotherapy w/ mets to lung, liver, spleen, spine, bone and brain  - c/w pregabalin, methylphenidate (confirmed on ISTOP Reference #: [id number])  - on dilaudid pca pump   - pain management reconsulted, adjusted pain regimen today 1/24    #Thrombocytopenia  -likely from chemo/sepsis  -will monitor cbc      dvt ppx scd  disposition: still medically active, need to wean off dilaudid pca and establish oral pain regimen. need to establish outpatient blood transfusions

## 2025-01-24 NOTE — PROGRESS NOTE ADULT - SUBJECTIVE AND OBJECTIVE BOX
39y  Female with h/o metastatic breast cancer ER/[place] Neg, HER-2 + on chemotherapy (last dose 12/26/24) (mets to lung, liver, spleen, spine, bone and brain), gastritis w/ intermittent episodes of dark stool (follows w/ Dr. Rosado GI and is on PPI and octreotide daily). Patient presented 1/5 with c/o worsening SOB.  She was seen in ED 1/2/25 for for weakness and SOB at which time she was found to have Hb of 5 requiring PRBC transfusion and positive for Flu A and started on tamiflu and was discharged from the ED on 1/3/25.  Her respiratory status has worsened since then w/ productive cough associated with body aches and chills.  Denies sick contacts but has had many frequent hospital visits. Patient has been afebrile, no leukocytosis. Was placed on BIPAP for Worsening respiratory status. continued on Tamiflu. Vancomycn and Zosyn was added. Blood cultures with MSSA.     - 1/22/2025: She says that he pain is somewhat controlled with her current regimen. She feels that dilaudid works reasonably well and that she is unable to tolerate fentanyl. Otherwise, breathing is ok and no n/v/d. She says that she has noted that her legs are somewhat swollen but doppler studies were negative for dvt. She has not noted any bleeding from any site. She is concerned though that she will need some form of venous access once she is discharged since her veins are so bad and she is going to need labs, fluids, iron, treatments in general to be given as an outpatient.    1/24 - hgb 7.4, continues to be on PCA, afebrile, diarrhea improved      PAST MEDICAL & SURGICAL HISTORY:  H/O compression fracture of spine  Anxiety  Metastatic breast cancer  H/O pleural effusion  Pericardial effusion  S/P tonsillectomy  H/O chest tube placement  12/23/21  S/P pericardiocentesis  12/28/21    Allergies  pertuzumab (Other (Severe))  Perjeta (Other (Severe))    MEDICATIONS  (STANDING):  acetylcysteine 10%  Inhalation 4 milliLiter(s) Inhalation every 6 hours  aminocaproic acid Tablet 4 Gram(s) Oral every 6 hours  chlorhexidine 2% Cloths 1 Application(s) Topical daily  dextrose 5%. 1000 milliLiter(s) (50 mL/Hr) IV Continuous <Continuous>  dextrose 5%. 1000 milliLiter(s) (100 mL/Hr) IV Continuous <Continuous>  dextrose 50% Injectable 25 Gram(s) IV Push once  dextrose 50% Injectable 12.5 Gram(s) IV Push once  dextrose 50% Injectable 25 Gram(s) IV Push once  FLUoxetine Solution 80 milliGRAM(s) Oral at bedtime  glucagon  Injectable 1 milliGRAM(s) IntraMuscular once  hydrocortisone 1% Cream 1 Application(s) Topical daily  hydrocortisone 2.5% Rectal Cream 1 Application(s) Rectal two times a day  HYDROmorphone PCA (1 mG/mL) 30 milliLiter(s) PCA Continuous PCA Continuous  insulin lispro (ADMELOG) corrective regimen sliding scale   SubCutaneous three times a day before meals  levalbuterol Inhalation 1.25 milliGRAM(s) Inhalation every 6 hours  memantine 5 milliGRAM(s) Oral at bedtime  memantine 10 milliGRAM(s) Oral with breakfast  methadone    Tablet 10 milliGRAM(s) Oral at bedtime  methadone    Tablet 5 milliGRAM(s) Oral <User Schedule>  methylphenidate 20 milliGRAM(s) Oral <User Schedule>  nafcillin  IVPB 2 Gram(s) IV Intermittent every 4 hours  octreotide  Injectable 100 MICROGram(s) SubCutaneous two times a day  OLANZapine 2.5 milliGRAM(s) Oral at bedtime  pantoprazole  Injectable 40 milliGRAM(s) IV Push every 12 hours  predniSONE   Tablet 20 milliGRAM(s) Oral daily  pregabalin 200 milliGRAM(s) Oral every 8 hours  sodium chloride 0.9% with potassium chloride 20 mEq/L 1000 milliLiter(s) (100 mL/Hr) IV Continuous <Continuous>    MEDICATIONS  (PRN):  acetaminophen     Tablet .. 650 milliGRAM(s) Oral every 6 hours PRN Temp greater or equal to 38C (100.4F), Mild Pain (1 - 3)  aluminum hydroxide/magnesium hydroxide/simethicone Suspension 30 milliLiter(s) Oral every 4 hours PRN Dyspepsia  benzonatate 100 milliGRAM(s) Oral every 8 hours PRN Cough  dextrose Oral Gel 15 Gram(s) Oral once PRN Blood Glucose LESS THAN 70 milliGRAM(s)/deciliter  guaifenesin/dextromethorphan Oral Liquid 10 milliLiter(s) Oral every 4 hours PRN Cough  HYDROmorphone  Injectable 0.5 milliGRAM(s) IV Push every 3 hours PRN Severe Pain (7 - 10)  melatonin 3 milliGRAM(s) Oral at bedtime PRN Insomnia  naloxone Injectable 0.1 milliGRAM(s) IV Push every 3 minutes PRN For ANY of the following changes in patient status:  A. RR LESS THAN 10 breaths per minute, B. Oxygen saturation LESS THAN 90%, C. Sedation score of 6  ondansetron Injectable 4 milliGRAM(s) IV Push every 8 hours PRN Nausea and/or Vomiting    ICU Vital Signs Last 24 Hrs  T(C): 36.6 (24 Jan 2025 07:54), Max: 36.8 (23 Jan 2025 12:15)  T(F): 97.9 (24 Jan 2025 07:54), Max: 98.2 (23 Jan 2025 12:15)  HR: 78 (24 Jan 2025 08:00) (72 - 102)  BP: 102/61 (24 Jan 2025 08:00) (94/59 - 122/67)  BP(mean): 73 (24 Jan 2025 08:00) (73 - 84)  ABP: --  ABP(mean): --  RR: 19 (24 Jan 2025 08:00) (16 - 19)  SpO2: 99% (24 Jan 2025 08:00) (96% - 100%)    O2 Parameters below as of 24 Jan 2025 08:00  Patient On (Oxygen Delivery Method): nasal cannula  O2 Flow (L/min): 2      PE:  NAD  On O2 NC  bilateral rales  abd soft, nd, nt  a/o x 3      CBC                          7.4    5.25  )-----------( 142      ( 24 Jan 2025 06:15 )             24.6                             8.3    8.37  )-----------( 173      ( 23 Jan 2025 06:54 )             26.6                           7.5    8.60  )-----------( 156      ( 21 Jan 2025 06:58 )             25.2                             8.1    9.59  )-----------( 158      ( 20 Jan 2025 07:25 )             27.1                             8.3    8.96  )-----------( 156      ( 19 Jan 2025 07:18 )             27.8                           8.6    9.26  )-----------( 136      ( 18 Jan 2025 04:55 )             27.8                             6.4    9.75  )-----------( 130      ( 17 Jan 2025 06:45 )             20.7         Chem:       01-21    139  |  105  |  4.5[L]  ----------------------------<  132[H]  3.8   |  24.0  |  0.42[L]    Ca    8.0[L]      21 Jan 2025 06:58    TPro  5.1[L]  /  Alb  2.3[L]  /  TBili  0.5  /  DBili  x   /  AST  19  /  ALT  12  /  AlkPhos  140[H]  01-21 01-20    138  |  103  |  4.9[L]  ----------------------------<  167[H]  3.9   |  25.0  |  0.51    Ca    8.0[L]      20 Jan 2025 07:25    TPro  5.5[L]  /  Alb  2.3[L]  /  TBili  0.6  /  DBili  x   /  AST  19  /  ALT  13  /  AlkPhos  165[H]  01-20 01-17    139  |  104  |  8.7  ----------------------------<  163[H]  2.9[LL]   |  26.0  |  0.70    Ca    7.3[L]      17 Jan 2025 06:45  Phos  4.2     01-17  Mg     1.5     01-17    TPro  5.0[L]  /  Alb  2.1[L]  /  TBili  0.8  /  DBili  0.3  /  AST  17  /  ALT  13  /  AlkPhos  127[H]  01-16      01-15    138  |  102  |  12.3  ----------------------------<  135[H]  3.6   |  25.0  |  0.67    Ca    8.1[L]      15 Cristhian 2025 06:10      01-14    141  |  103  |  13.4  ----------------------------<  127[H]  3.4[L]   |  30.0[H]  |  0.54    Ca    8.1[L]      14 Jan 2025 04:48        01-13    141  |  103  |  20.3[H]  ----------------------------<  137[H]  3.8   |  27.0  |  0.47[L]    Ca    8.2[L]      13 Jan 2025 06:10

## 2025-01-24 NOTE — PROVIDER CONTACT NOTE (OTHER) - REASON
Refusal of SCDs/VC
Critical Lab results
ABG results and pt assessment
SCD boots
bloody bowel movement

## 2025-01-24 NOTE — PROVIDER CONTACT NOTE (OTHER) - RECOMMENDATIONS
stat labs and transfuse if needed
Patient educated on the importance of wearing the SCDs/VC boots. Patient continues to refuse.

## 2025-01-24 NOTE — PROGRESS NOTE ADULT - SUBJECTIVE AND OBJECTIVE BOX
NATIVIDAD MYERS  [id number]        Chief Complaint: recalled to evaluate bactermia and need for MARYANN      Subjective: No CP/SOb/palps      24 hour Tele: ST / SR         acetaminophen     Tablet .. 650 milliGRAM(s) Oral every 6 hours PRN  acetylcysteine 10%  Inhalation 4 milliLiter(s) Inhalation every 6 hours  aluminum hydroxide/magnesium hydroxide/simethicone Suspension 30 milliLiter(s) Oral every 4 hours PRN  aminocaproic acid Tablet 4 Gram(s) Oral every 6 hours  benzonatate 100 milliGRAM(s) Oral every 8 hours PRN  chlorhexidine 2% Cloths 1 Application(s) Topical daily  dextrose 5%. 1000 milliLiter(s) IV Continuous <Continuous>  dextrose 5%. 1000 milliLiter(s) IV Continuous <Continuous>  dextrose 50% Injectable 25 Gram(s) IV Push once  dextrose 50% Injectable 25 Gram(s) IV Push once  dextrose 50% Injectable 12.5 Gram(s) IV Push once  dextrose Oral Gel 15 Gram(s) Oral once PRN  FLUoxetine Solution 80 milliGRAM(s) Oral at bedtime  glucagon  Injectable 1 milliGRAM(s) IntraMuscular once  guaifenesin/dextromethorphan Oral Liquid 10 milliLiter(s) Oral every 4 hours PRN  hydrocortisone 1% Cream 1 Application(s) Topical daily  hydrocortisone 2.5% Rectal Cream 1 Application(s) Rectal two times a day  HYDROmorphone  Injectable 0.5 milliGRAM(s) IV Push every 3 hours PRN  HYDROmorphone PCA (1 mG/mL) 30 milliLiter(s) PCA Continuous PCA Continuous  insulin lispro (ADMELOG) corrective regimen sliding scale   SubCutaneous three times a day before meals  levalbuterol Inhalation 1.25 milliGRAM(s) Inhalation every 6 hours  melatonin 3 milliGRAM(s) Oral at bedtime PRN  memantine 5 milliGRAM(s) Oral at bedtime  memantine 10 milliGRAM(s) Oral with breakfast  methadone    Tablet 10 milliGRAM(s) Oral at bedtime  methylphenidate 20 milliGRAM(s) Oral <User Schedule>  nafcillin  IVPB 2 Gram(s) IV Intermittent every 4 hours  naloxone Injectable 0.1 milliGRAM(s) IV Push every 3 minutes PRN  octreotide  Injectable 100 MICROGram(s) SubCutaneous two times a day  OLANZapine 2.5 milliGRAM(s) Oral at bedtime  ondansetron Injectable 4 milliGRAM(s) IV Push every 8 hours PRN  pantoprazole  Injectable 40 milliGRAM(s) IV Push every 12 hours  predniSONE   Tablet 20 milliGRAM(s) Oral daily  pregabalin 200 milliGRAM(s) Oral every 8 hours  sodium chloride 0.65% Nasal 1 Spray(s) Both Nostrils five times a day PRN  sodium chloride 0.9% with potassium chloride 20 mEq/L 1000 milliLiter(s) IV Continuous <Continuous>          Physical Exam:  T(C): 36.6 (01-24-25 @ 07:54), Max: 36.8 (01-23-25 @ 12:15)  HR: 95 (01-24-25 @ 10:00) (72 - 102)  BP: 97/64 (01-24-25 @ 10:00) (94/59 - 122/67)  RR: 17 (01-24-25 @ 10:00) (16 - 19)  SpO2: 95% (01-24-25 @ 10:00) (95% - 100%)  Wt(kg): --  General: Comfortable in NAD, pale  HEENT: MMM, sclera anicteric  Resp: CTA bilaterally  CVS: nl s1s2, rrr, no s3/JVD  Abd: soft, NT, ND, BS+  Ext: No c/c/e  Neuro A&O x3, tremulous  Psych: Normal affect    I&O's Summary    23 Jan 2025 07:01  -  24 Jan 2025 07:00  --------------------------------------------------------  IN: 2500 mL / OUT: 4400 mL / NET: -1900 mL    24 Jan 2025 07:01  -  24 Jan 2025 11:26  --------------------------------------------------------  IN: 400 mL / OUT: 550 mL / NET: -150 mL          Labs:   24 Jan 2025 06:15    141    |  104    |  5.9    ----------------------------<  198    3.5     |  26.0   |  0.46     Ca    8.0        24 Jan 2025 06:15    TPro  4.8    /  Alb  2.1    /  TBili  0.4    /  DBili  x      /  AST  18     /  ALT  10     /  AlkPhos  124    24 Jan 2025 06:15                          7.4    5.25  )-----------( 142      ( 24 Jan 2025 06:15 )             24.6           TTE Limited W or WO Ultrasound Enhancing Agent (01.06.25 @ 12:55) >  1. Technically difficult image quality.   2. Left ventricular systolic function is normal with an ejection fraction visually estimated at 65 to 70 %.   3. Normal right ventricular cavity size, with normal wall thickness, and normal right ventricular systolic function.   4. Compared to the transthoracic echocardiogram performed on 7/21/2024, there have been no significant interval changes        < from: TTE W or WO Ultrasound Enhancing Agent (07.21.24 @ 15:40) >  1. Left ventricular cavity is normal in size. Left ventricular wall thickness is normal. Left ventricular systolic function is normal with an ejection fraction visually estimated at 55 to 60 %.   2. Normal left ventricular diastolic function.   3. Normal right ventricular cavity size and normal right ventricular systolic function.   4. The left atrium is normal in size.   5. The right atrium is normal in size.   6. Trileaflet aortic valve with normal systolic excursion.   7. Structurally normal mitral valve with normal leaflet excursion.   8. Trace mitral regurgitation.   9. Mild tricuspid regurgitation.  10. Estimated pulmonary artery systolic pressure is 23 mmHg, normal pulmonary artery pressure.  11. The interatrial septum appears intact.  12. No pericardial effusion seen.      Assessment:  40 y/o  Female with h/o metastatic breast cancer ER/[place] Neg, HER-2 + on chemotherapy (last dose 12/26/24) (mets to lung, liver, spleen, spine, bone and brain), gastritis w/ intermittent episodes of dark stool (follows w/ Dr. Rosado GI and is on PPI and octreotide daily). Patient presented 1/5 with c/o worsening SOB.  In ED 1/2/25 c/o weakness and SOB at which time she was found to have Hb of 5 requiring PRBC transfusion and positive for Flu A and started on tamiflu and was discharged from the ED on 1/3/25.    Her respiratory status worsened w/ productive cough associated with body aches and chills.  Denies sick contacts but has had many frequent hospital visits. Patient has been afebrile, no leukocytosis.  Continued on Tamiflu. Vancomycn and Zosyn was added. Blood cultures with MSSA.  Cardiology consultation requested for evaluation of endocarditis.   Per ID:- Blood cultures 1/5, 1/7, 1/9 reporting MSSA , Repeat blood cultures 1/11/25, No growth.  Sputum Cx 1/6 Staphylococcus aureus  - Urine Cx 1/5 reporting 10k - 49k Escherichia coli  of ? significance  since UA not concerning for UTI   - RVP/COVID 19 PCR + Influenza A  -Also was positive for Norovirus and Giardia     -Patient with GIB requiring transfusion from 1/8/2025 and drop in Hgb to 6.7, remains low with continued blood in stool  -MARYANN had been contraindicated with active GIB. Fairly stable now. Port removed as well , blood cultures 1/10, 1/11, 1/17 with NGTD  -GI eval with no plan for scopes 2/2 recently done.  H/H dropped some post PRBCs. Long GI history, ? GAVE disease per GI notes and prior EGD, bleeding all gastric and no esophageal disease. Also internal hemorrhoids. . H as had multiple EGD/colonoscopies   -Need to have MARYANN to help plan course of antibiotics which also effects timing of starting chemotherapy      Plan:  (TO BE SEEN)  1.   2. GI and Heme/onc f/u.  3. Abx per ID. Repeat blood cultures planned , Port removed   4. Will need ID recc re duration of abx therapy.  5. Will be important to get several sets of surveillance BC upon completion of abx course            Zhang Dunn MD NATIVIDAD MYERS  [id number]        Chief Complaint: recalled to evaluate bactermia and need for MARYANN      Subjective: No CP/SOb/palps      24 hour Tele: ST / SR         acetaminophen     Tablet .. 650 milliGRAM(s) Oral every 6 hours PRN  acetylcysteine 10%  Inhalation 4 milliLiter(s) Inhalation every 6 hours  aluminum hydroxide/magnesium hydroxide/simethicone Suspension 30 milliLiter(s) Oral every 4 hours PRN  aminocaproic acid Tablet 4 Gram(s) Oral every 6 hours  benzonatate 100 milliGRAM(s) Oral every 8 hours PRN  chlorhexidine 2% Cloths 1 Application(s) Topical daily  dextrose 5%. 1000 milliLiter(s) IV Continuous <Continuous>  dextrose 5%. 1000 milliLiter(s) IV Continuous <Continuous>  dextrose 50% Injectable 25 Gram(s) IV Push once  dextrose 50% Injectable 25 Gram(s) IV Push once  dextrose 50% Injectable 12.5 Gram(s) IV Push once  dextrose Oral Gel 15 Gram(s) Oral once PRN  FLUoxetine Solution 80 milliGRAM(s) Oral at bedtime  glucagon  Injectable 1 milliGRAM(s) IntraMuscular once  guaifenesin/dextromethorphan Oral Liquid 10 milliLiter(s) Oral every 4 hours PRN  hydrocortisone 1% Cream 1 Application(s) Topical daily  hydrocortisone 2.5% Rectal Cream 1 Application(s) Rectal two times a day  HYDROmorphone  Injectable 0.5 milliGRAM(s) IV Push every 3 hours PRN  HYDROmorphone PCA (1 mG/mL) 30 milliLiter(s) PCA Continuous PCA Continuous  insulin lispro (ADMELOG) corrective regimen sliding scale   SubCutaneous three times a day before meals  levalbuterol Inhalation 1.25 milliGRAM(s) Inhalation every 6 hours  melatonin 3 milliGRAM(s) Oral at bedtime PRN  memantine 5 milliGRAM(s) Oral at bedtime  memantine 10 milliGRAM(s) Oral with breakfast  methadone    Tablet 10 milliGRAM(s) Oral at bedtime  methylphenidate 20 milliGRAM(s) Oral <User Schedule>  nafcillin  IVPB 2 Gram(s) IV Intermittent every 4 hours  naloxone Injectable 0.1 milliGRAM(s) IV Push every 3 minutes PRN  octreotide  Injectable 100 MICROGram(s) SubCutaneous two times a day  OLANZapine 2.5 milliGRAM(s) Oral at bedtime  ondansetron Injectable 4 milliGRAM(s) IV Push every 8 hours PRN  pantoprazole  Injectable 40 milliGRAM(s) IV Push every 12 hours  predniSONE   Tablet 20 milliGRAM(s) Oral daily  pregabalin 200 milliGRAM(s) Oral every 8 hours  sodium chloride 0.65% Nasal 1 Spray(s) Both Nostrils five times a day PRN  sodium chloride 0.9% with potassium chloride 20 mEq/L 1000 milliLiter(s) IV Continuous <Continuous>          Physical Exam:  T(C): 36.6 (01-24-25 @ 07:54), Max: 36.8 (01-23-25 @ 12:15)  HR: 95 (01-24-25 @ 10:00) (72 - 102)  BP: 97/64 (01-24-25 @ 10:00) (94/59 - 122/67)  RR: 17 (01-24-25 @ 10:00) (16 - 19)  SpO2: 95% (01-24-25 @ 10:00) (95% - 100%)  Wt(kg): --  General: Comfortable in NAD, pale  HEENT: MMM, sclera anicteric  Resp: CTA bilaterally  CVS: nl s1s2, rrr, no s3/JVD  Abd: soft, NT, ND, BS+  Ext: No c/c/e  Neuro A&O x3, tremulous  Psych: Normal affect    I&O's Summary    23 Jan 2025 07:01  -  24 Jan 2025 07:00  --------------------------------------------------------  IN: 2500 mL / OUT: 4400 mL / NET: -1900 mL    24 Jan 2025 07:01  -  24 Jan 2025 11:26  --------------------------------------------------------  IN: 400 mL / OUT: 550 mL / NET: -150 mL          Labs:   24 Jan 2025 06:15    141    |  104    |  5.9    ----------------------------<  198    3.5     |  26.0   |  0.46     Ca    8.0        24 Jan 2025 06:15    TPro  4.8    /  Alb  2.1    /  TBili  0.4    /  DBili  x      /  AST  18     /  ALT  10     /  AlkPhos  124    24 Jan 2025 06:15                          7.4    5.25  )-----------( 142      ( 24 Jan 2025 06:15 )             24.6           TTE Limited W or WO Ultrasound Enhancing Agent (01.06.25 @ 12:55) >  1. Technically difficult image quality.   2. Left ventricular systolic function is normal with an ejection fraction visually estimated at 65 to 70 %.   3. Normal right ventricular cavity size, with normal wall thickness, and normal right ventricular systolic function.   4. Compared to the transthoracic echocardiogram performed on 7/21/2024, there have been no significant interval changes        < from: TTE W or WO Ultrasound Enhancing Agent (07.21.24 @ 15:40) >  1. Left ventricular cavity is normal in size. Left ventricular wall thickness is normal. Left ventricular systolic function is normal with an ejection fraction visually estimated at 55 to 60 %.   2. Normal left ventricular diastolic function.   3. Normal right ventricular cavity size and normal right ventricular systolic function.   4. The left atrium is normal in size.   5. The right atrium is normal in size.   6. Trileaflet aortic valve with normal systolic excursion.   7. Structurally normal mitral valve with normal leaflet excursion.   8. Trace mitral regurgitation.   9. Mild tricuspid regurgitation.  10. Estimated pulmonary artery systolic pressure is 23 mmHg, normal pulmonary artery pressure.  11. The interatrial septum appears intact.  12. No pericardial effusion seen.      Assessment:  38 y/o  Female with h/o metastatic breast cancer ER/[place] Neg, HER-2 + on chemotherapy (last dose 12/26/24) (mets to lung, liver, spleen, spine, bone and brain), gastritis w/ intermittent episodes of dark stool (follows w/ Dr. Rosado GI and is on PPI and octreotide daily). Patient presented 1/5 with c/o worsening SOB.  In ED 1/2/25 c/o weakness and SOB at which time she was found to have Hb of 5 requiring PRBC transfusion and positive for Flu A and started on tamiflu and was discharged from the ED on 1/3/25.    Her respiratory status worsened w/ productive cough associated with body aches and chills.  Denies sick contacts but has had many frequent hospital visits. Patient has been afebrile, no leukocytosis.  Continued on Tamiflu. Vancomycn and Zosyn was added. Blood cultures with MSSA.  Cardiology consultation requested for evaluation of endocarditis.   Per ID:- Blood cultures 1/5, 1/7, 1/9 reporting MSSA , Repeat blood cultures 1/11/25, No growth.  Sputum Cx 1/6 Staphylococcus aureus  - Urine Cx 1/5 reporting 10k - 49k Escherichia coli  of ? significance  since UA not concerning for UTI   - RVP/COVID 19 PCR + Influenza A  -Also was positive for Norovirus and Giardia     -Patient with GIB requiring transfusion from 1/8/2025 and drop in Hgb to 6.7, stable at 7.4 now  -MARYANN had been contraindicated with active GIB. Fairly stable now. Port removed as well , blood cultures 1/10, 1/11, 1/17 with NGTD  -GI eval with no plan for scopes 2/2 recently done.  H/H dropped some post PRBCs. Long GI history, ? GAVE disease per GI notes and prior EGD, bleeding all gastric and no esophageal disease. Also internal hemorrhoids. . H as had multiple EGD/colonoscopies   -Need to have MARYANN to help plan course of antibiotics which also effects timing of starting chemotherapy      Plan:    1. Will arrange MARYANN for Monday  2. GI and Heme/onc f/u.  3. Abx per ID.   4. NPO after midnight sunday  5. Otherwise stable from CV standpoint             Zhang Dunn MD

## 2025-01-25 LAB
ANION GAP SERPL CALC-SCNC: 11 MMOL/L — SIGNIFICANT CHANGE UP (ref 5–17)
BLD GP AB SCN SERPL QL: SIGNIFICANT CHANGE UP
BUN SERPL-MCNC: 6.4 MG/DL — LOW (ref 8–20)
CALCIUM SERPL-MCNC: 8.4 MG/DL — SIGNIFICANT CHANGE UP (ref 8.4–10.5)
CHLORIDE SERPL-SCNC: 103 MMOL/L — SIGNIFICANT CHANGE UP (ref 96–108)
CO2 SERPL-SCNC: 26 MMOL/L — SIGNIFICANT CHANGE UP (ref 22–29)
CREAT SERPL-MCNC: 0.51 MG/DL — SIGNIFICANT CHANGE UP (ref 0.5–1.3)
EGFR: 122 ML/MIN/1.73M2 — SIGNIFICANT CHANGE UP
GLUCOSE BLDC GLUCOMTR-MCNC: 110 MG/DL — HIGH (ref 70–99)
GLUCOSE BLDC GLUCOMTR-MCNC: 132 MG/DL — HIGH (ref 70–99)
GLUCOSE BLDC GLUCOMTR-MCNC: 135 MG/DL — HIGH (ref 70–99)
GLUCOSE BLDC GLUCOMTR-MCNC: 232 MG/DL — HIGH (ref 70–99)
GLUCOSE SERPL-MCNC: 105 MG/DL — HIGH (ref 70–99)
HCT VFR BLD CALC: 25 % — LOW (ref 34.5–45)
HGB BLD-MCNC: 7.6 G/DL — LOW (ref 11.5–15.5)
MAGNESIUM SERPL-MCNC: 1.6 MG/DL — LOW (ref 1.8–2.6)
MCHC RBC-ENTMCNC: 29.3 PG — SIGNIFICANT CHANGE UP (ref 27–34)
MCHC RBC-ENTMCNC: 30.4 G/DL — LOW (ref 32–36)
MCV RBC AUTO: 96.5 FL — SIGNIFICANT CHANGE UP (ref 80–100)
NRBC # BLD: 1 /100 WBCS — HIGH (ref 0–0)
NRBC BLD-RTO: 1 /100 WBCS — HIGH (ref 0–0)
PHOSPHATE SERPL-MCNC: 4.2 MG/DL — SIGNIFICANT CHANGE UP (ref 2.4–4.7)
PLATELET # BLD AUTO: 153 K/UL — SIGNIFICANT CHANGE UP (ref 150–400)
POTASSIUM SERPL-MCNC: 3.7 MMOL/L — SIGNIFICANT CHANGE UP (ref 3.5–5.3)
POTASSIUM SERPL-SCNC: 3.7 MMOL/L — SIGNIFICANT CHANGE UP (ref 3.5–5.3)
RBC # BLD: 2.59 M/UL — LOW (ref 3.8–5.2)
RBC # FLD: 25.1 % — HIGH (ref 10.3–14.5)
SODIUM SERPL-SCNC: 139 MMOL/L — SIGNIFICANT CHANGE UP (ref 135–145)
WBC # BLD: 5.15 K/UL — SIGNIFICANT CHANGE UP (ref 3.8–10.5)
WBC # FLD AUTO: 5.15 K/UL — SIGNIFICANT CHANGE UP (ref 3.8–10.5)

## 2025-01-25 PROCEDURE — G0545: CPT

## 2025-01-25 PROCEDURE — 99233 SBSQ HOSP IP/OBS HIGH 50: CPT

## 2025-01-25 PROCEDURE — 99232 SBSQ HOSP IP/OBS MODERATE 35: CPT

## 2025-01-25 RX ORDER — PREDNISONE 5 MG/1
10 TABLET ORAL DAILY
Refills: 0 | Status: DISCONTINUED | OUTPATIENT
Start: 2025-01-26 | End: 2025-01-31

## 2025-01-25 RX ORDER — FLUOXETINE HCL 10 MG
80 CAPSULE ORAL AT BEDTIME
Refills: 0 | Status: DISCONTINUED | OUTPATIENT
Start: 2025-01-25 | End: 2025-01-31

## 2025-01-25 RX ADMIN — DEXTROSE MONOHYDRATE, SODIUM CHLORIDE, AND POTASSIUM CHLORIDE 100 MILLILITER(S): 50; 2.25; 2.24 INJECTION, SOLUTION INTRAVENOUS at 03:13

## 2025-01-25 RX ADMIN — DEXTROSE MONOHYDRATE, SODIUM CHLORIDE, AND POTASSIUM CHLORIDE 100 MILLILITER(S): 50; 2.25; 2.24 INJECTION, SOLUTION INTRAVENOUS at 20:47

## 2025-01-25 RX ADMIN — HYDROMORPHONE HYDROCHLORIDE 30 MILLILITER(S): 4 INJECTION, SOLUTION INTRAMUSCULAR; INTRAVENOUS; SUBCUTANEOUS at 19:10

## 2025-01-25 RX ADMIN — HYDROMORPHONE HYDROCHLORIDE 0.5 MILLIGRAM(S): 4 INJECTION, SOLUTION INTRAMUSCULAR; INTRAVENOUS; SUBCUTANEOUS at 06:37

## 2025-01-25 RX ADMIN — HYDROMORPHONE HYDROCHLORIDE 0.5 MILLIGRAM(S): 4 INJECTION, SOLUTION INTRAMUSCULAR; INTRAVENOUS; SUBCUTANEOUS at 00:05

## 2025-01-25 RX ADMIN — HYDROMORPHONE HYDROCHLORIDE 0.5 MILLIGRAM(S): 4 INJECTION, SOLUTION INTRAMUSCULAR; INTRAVENOUS; SUBCUTANEOUS at 17:25

## 2025-01-25 RX ADMIN — ACETAMINOPHEN, DIPHENHYDRAMINE HCL, PHENYLEPHRINE HCL 3 MILLIGRAM(S): 325; 25; 5 TABLET ORAL at 21:18

## 2025-01-25 RX ADMIN — NAFCILLIN INJECTION 200 GRAM(S): 2 POWDER, FOR SOLUTION INTRAMUSCULAR; INTRAMUSCULAR; INTRAVENOUS at 09:34

## 2025-01-25 RX ADMIN — HYDROMORPHONE HYDROCHLORIDE 30 MILLILITER(S): 4 INJECTION, SOLUTION INTRAMUSCULAR; INTRAVENOUS; SUBCUTANEOUS at 07:05

## 2025-01-25 RX ADMIN — ANTISEPTIC SURGICAL SCRUB 1 APPLICATION(S): 0.04 SOLUTION TOPICAL at 09:49

## 2025-01-25 RX ADMIN — NAFCILLIN INJECTION 200 GRAM(S): 2 POWDER, FOR SOLUTION INTRAMUSCULAR; INTRAMUSCULAR; INTRAVENOUS at 21:18

## 2025-01-25 RX ADMIN — LIDOCAINE HYDROCHLORIDE 1 PATCH: 30 CREAM TOPICAL at 09:35

## 2025-01-25 RX ADMIN — HYDROMORPHONE HYDROCHLORIDE 0.5 MILLIGRAM(S): 4 INJECTION, SOLUTION INTRAMUSCULAR; INTRAVENOUS; SUBCUTANEOUS at 03:13

## 2025-01-25 RX ADMIN — Medication 1 APPLICATION(S): at 05:59

## 2025-01-25 RX ADMIN — HYDROMORPHONE HYDROCHLORIDE 0.5 MILLIGRAM(S): 4 INJECTION, SOLUTION INTRAMUSCULAR; INTRAVENOUS; SUBCUTANEOUS at 22:50

## 2025-01-25 RX ADMIN — HYDROMORPHONE HYDROCHLORIDE 0.5 MILLIGRAM(S): 4 INJECTION, SOLUTION INTRAMUSCULAR; INTRAVENOUS; SUBCUTANEOUS at 15:18

## 2025-01-25 RX ADMIN — DEXTROSE MONOHYDRATE, SODIUM CHLORIDE, AND POTASSIUM CHLORIDE 100 MILLILITER(S): 50; 2.25; 2.24 INJECTION, SOLUTION INTRAVENOUS at 10:11

## 2025-01-25 RX ADMIN — AMINOCAPROIC ACID 4 GRAM(S): 1000 TABLET ORAL at 22:40

## 2025-01-25 RX ADMIN — HYDROMORPHONE HYDROCHLORIDE 0.5 MILLIGRAM(S): 4 INJECTION, SOLUTION INTRAMUSCULAR; INTRAVENOUS; SUBCUTANEOUS at 21:17

## 2025-01-25 RX ADMIN — AMINOCAPROIC ACID 4 GRAM(S): 1000 TABLET ORAL at 16:38

## 2025-01-25 RX ADMIN — MEMANTINE HYDROCHLORIDE 10 MILLIGRAM(S): 7 CAPSULE, EXTENDED RELEASE ORAL at 07:28

## 2025-01-25 RX ADMIN — Medication 1 APPLICATION(S): at 16:47

## 2025-01-25 RX ADMIN — MEMANTINE HYDROCHLORIDE 5 MILLIGRAM(S): 7 CAPSULE, EXTENDED RELEASE ORAL at 22:40

## 2025-01-25 RX ADMIN — Medication 4 MILLILITER(S): at 16:12

## 2025-01-25 RX ADMIN — NAFCILLIN INJECTION 200 GRAM(S): 2 POWDER, FOR SOLUTION INTRAMUSCULAR; INTRAMUSCULAR; INTRAVENOUS at 17:23

## 2025-01-25 RX ADMIN — OLANZAPINE 2.5 MILLIGRAM(S): 10 TABLET, FILM COATED ORAL at 21:17

## 2025-01-25 RX ADMIN — OCTREOTIDE ACETATE 100 MICROGRAM(S): 1000 INJECTION INTRAVENOUS; SUBCUTANEOUS at 16:39

## 2025-01-25 RX ADMIN — HYDROMORPHONE HYDROCHLORIDE 0.5 MILLIGRAM(S): 4 INJECTION, SOLUTION INTRAMUSCULAR; INTRAVENOUS; SUBCUTANEOUS at 09:41

## 2025-01-25 RX ADMIN — Medication 80 MILLIGRAM(S): at 21:17

## 2025-01-25 RX ADMIN — Medication 1.25 MILLIGRAM(S): at 16:12

## 2025-01-25 RX ADMIN — Medication 2: at 11:14

## 2025-01-25 RX ADMIN — Medication 4 MILLILITER(S): at 20:46

## 2025-01-25 RX ADMIN — HYDROMORPHONE HYDROCHLORIDE 30 MILLILITER(S): 4 INJECTION, SOLUTION INTRAMUSCULAR; INTRAVENOUS; SUBCUTANEOUS at 11:16

## 2025-01-25 RX ADMIN — AMINOCAPROIC ACID 4 GRAM(S): 1000 TABLET ORAL at 09:36

## 2025-01-25 RX ADMIN — PREGABALIN CAPSULES, CV 200 MILLIGRAM(S): 225 CAPSULE ORAL at 14:14

## 2025-01-25 RX ADMIN — PREGABALIN CAPSULES, CV 200 MILLIGRAM(S): 225 CAPSULE ORAL at 21:17

## 2025-01-25 RX ADMIN — HYDROMORPHONE HYDROCHLORIDE 0.5 MILLIGRAM(S): 4 INJECTION, SOLUTION INTRAMUSCULAR; INTRAVENOUS; SUBCUTANEOUS at 01:05

## 2025-01-25 RX ADMIN — HYDROMORPHONE HYDROCHLORIDE 0.5 MILLIGRAM(S): 4 INJECTION, SOLUTION INTRAMUSCULAR; INTRAVENOUS; SUBCUTANEOUS at 10:22

## 2025-01-25 RX ADMIN — AMINOCAPROIC ACID 4 GRAM(S): 1000 TABLET ORAL at 05:59

## 2025-01-25 RX ADMIN — Medication 1 APPLICATION(S): at 09:37

## 2025-01-25 RX ADMIN — Medication 1.25 MILLIGRAM(S): at 09:49

## 2025-01-25 RX ADMIN — LIDOCAINE HYDROCHLORIDE 1 PATCH: 30 CREAM TOPICAL at 21:18

## 2025-01-25 RX ADMIN — PREGABALIN CAPSULES, CV 200 MILLIGRAM(S): 225 CAPSULE ORAL at 05:58

## 2025-01-25 RX ADMIN — HYDROMORPHONE HYDROCHLORIDE 0.5 MILLIGRAM(S): 4 INJECTION, SOLUTION INTRAMUSCULAR; INTRAVENOUS; SUBCUTANEOUS at 04:13

## 2025-01-25 RX ADMIN — NAFCILLIN INJECTION 200 GRAM(S): 2 POWDER, FOR SOLUTION INTRAMUSCULAR; INTRAMUSCULAR; INTRAVENOUS at 02:53

## 2025-01-25 RX ADMIN — METHYLPHENIDATE HYDROCHLORIDE 20 MILLIGRAM(S): 36 TABLET, EXTENDED RELEASE ORAL at 09:35

## 2025-01-25 RX ADMIN — HYDROMORPHONE HYDROCHLORIDE 0.5 MILLIGRAM(S): 4 INJECTION, SOLUTION INTRAMUSCULAR; INTRAVENOUS; SUBCUTANEOUS at 14:18

## 2025-01-25 RX ADMIN — Medication 4 MILLILITER(S): at 09:49

## 2025-01-25 RX ADMIN — HYDROMORPHONE HYDROCHLORIDE 30 MILLILITER(S): 4 INJECTION, SOLUTION INTRAMUSCULAR; INTRAVENOUS; SUBCUTANEOUS at 14:56

## 2025-01-25 RX ADMIN — HYDROMORPHONE HYDROCHLORIDE 0.5 MILLIGRAM(S): 4 INJECTION, SOLUTION INTRAMUSCULAR; INTRAVENOUS; SUBCUTANEOUS at 17:50

## 2025-01-25 RX ADMIN — Medication 1.25 MILLIGRAM(S): at 20:46

## 2025-01-25 RX ADMIN — OCTREOTIDE ACETATE 100 MICROGRAM(S): 1000 INJECTION INTRAVENOUS; SUBCUTANEOUS at 05:57

## 2025-01-25 RX ADMIN — PANTOPRAZOLE 40 MILLIGRAM(S): 20 TABLET, DELAYED RELEASE ORAL at 05:58

## 2025-01-25 RX ADMIN — PANTOPRAZOLE 40 MILLIGRAM(S): 20 TABLET, DELAYED RELEASE ORAL at 16:37

## 2025-01-25 RX ADMIN — METHADONE HYDROCHLORIDE 10 MILLIGRAM(S): 5 SOLUTION ORAL at 21:18

## 2025-01-25 RX ADMIN — PREDNISONE 20 MILLIGRAM(S): 5 TABLET ORAL at 05:58

## 2025-01-25 RX ADMIN — NAFCILLIN INJECTION 200 GRAM(S): 2 POWDER, FOR SOLUTION INTRAMUSCULAR; INTRAMUSCULAR; INTRAVENOUS at 14:15

## 2025-01-25 RX ADMIN — NAFCILLIN INJECTION 200 GRAM(S): 2 POWDER, FOR SOLUTION INTRAMUSCULAR; INTRAMUSCULAR; INTRAVENOUS at 05:58

## 2025-01-25 RX ADMIN — LIDOCAINE HYDROCHLORIDE 1 PATCH: 30 CREAM TOPICAL at 03:24

## 2025-01-25 NOTE — PROGRESS NOTE ADULT - ASSESSMENT
39yoF w/ PMHx of metastatic breast cancer ER/NH Neg, HER-2 + on chemotherapy (last dose 12/26/24) (mets to lung, liver, spleen, spine, bone and brain), gastritis w/ intermittent episodes of dark stool (follows w/ Dr. Rosado GI and is on PPI and octreotide daily). Patient presented 1/5 with c/o worsening SOB. She was seen in ED 1/2/25 for weakness and SOB at which time she was found to have Hb of 5 requiring PRBC transfusion and positive for Flu A and started on tamiflu and was discharged from the ED on 1/3/25.  Her respiratory status has worsened since then w/ productive cough associated with body aches and chills. Admitted for sepsis and acute hypoxic respiratory failure with flu A s/p HFNC for worsening respiratory status and s/p course of Tamiflu. Vancomycin and Zosyn was added for possible superimposed bacterial pna. Blood cultures with MSSA. Cardiology consultation requested for evaluation of endocarditis. Blood cultures 1/5, 1/7, 1/9 reporting MSSA , Repeat blood cultures ngtd on 1/11/25, Sputum Cx 1/6 Staphylococcus aureus. MARYANN hold for active gib requring transfusion. Pt had large BM likley diverticular bleed. Had recent scopy. Gi following. ct abd/pelvis w/iv contrast no active bleed (1/13). Urine Cx 1/5 reporting 10k - 49k Escherichia coli  of ? significance  since UA not concerning for UTI.  RVP/COVID 19 PCR + Influenza A.GI PCR  positive norovirus,Giardia.    #Acute blood loss anemia   #GIB  - s/p multiple transfusions during this hospital stay  - goal hgb of 7   - CTA abd/pelvis  no active bleed   - PPI/OCTRIO  - GI eval with no plan for scopes 2/2 recently done, pt has had multiple EGD/colonoscopies in past as well.   - per GI, no plan for any endoscopy, pt will likely be transfusion dependent, need to arrange outpatient vs inpatient transfusions  - hem/onc following   - hem/onc started Amicar on 1/16  -monitor BMs, Hgb    #MSSA bacteremia  - blood clx positive 1/5, repeat bl c/s (1/11) ngtd  - ID following  - Continue nafcillin (would need to be 6 weeks duration if no MARYANN done)  - Cardiology following, planning for MARYANN on 1/27    #Sepsis 2/2 Flu A w/ superimposed multifocal PNA  #Acute hypoxic respiratory failure 2/2 Flu A w/ superimposed multifocal PNA   - on NC, comfortable  - persistently positive RVP for flu A  - s/p tamiflu   - repeat CT 1/8 shows improved pleural effusion but worsening GGOs, unclear if infectious or malignant etiology  - repeat CT 1/9 with similar findings  - will repeat CT PE to evaluate consolidation and PE given persistent hypoxia   - po steroid tapering dose; will change prednisone 20 -> 10mg qd   - c/w abx per ID recs  - sputum clx growing moderate staph aureus   - TTE EF 65-70%, no WMA    #Chronic normocytic anemia 2/2 metastatic breast cancer on chemo and 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   #Thrombocytopenia likely 2/2 sepsis   #Breast cancer ER/NH Neg, HER-2 pos on chemotherapy w/ mets to lung, liver, spleen, spine, bone and brain  - Baseline Hb ranges 8-9    - s/p prbc transfusion on 1/2, 1/8 and 1/9,1/11,1/13,1/14  - trend CBC, transfuse for hgb <7  - c/w home sub q octreotide and IV protonix  - Monitor CBC and transfuse for Hb<7 and plts<20K in setting of sepsis   - [facility] following  - monitor on tele and     #Nororvirus,Giadia gastroenteritis   - GI PCR  positive norovirus,Giadia  - Albendazole per ID (dc on 01/22 -- completed 5 day course)  - ID on board, recs noted    #Breast cancer ER/NH Neg, HER-2 pos on chemotherapy w/ mets to lung, liver, spleen, spine, bone and brain  - c/w pregabalin, methylphenidate (confirmed on ISTOP Reference #: [id number])  - on dilaudid pca pump, will continue this weekend   - pain management reconsulted, adjusted pain regimen 1/24    #Thrombocytopenia  - likely from chemo/sepsis  - will monitor cbc      dvt ppx scd  disposition: still medically active, need to wean off dilaudid pca and establish oral pain regimen. Pending MARYANN Monday. need to establish outpatient blood transfusions

## 2025-01-25 NOTE — PROGRESS NOTE ADULT - SUBJECTIVE AND OBJECTIVE BOX
[facility]  INFECTIOUS DISEASES at [place] / Likely / [place]  =======================================================                              Salazar Toro MD                              Professor Emeritus:  Dr Warner Mcintosh MD            Diplomates American Board of Internal Medicine & Infectious Diseases                                   Tel  [phone] Fax [phone]                                  Hospital Consult line:  [phone]  =======================================================      [place] [id number]    Follow up: MSSA bacteremia    No fevers   Diarrhea improved       Allergies:  pertuzumab (Other (Severe))  Perjeta (Other (Severe))        REVIEW OF SYSTEMS:  CONSTITUTIONAL:  No Fever or chills  HEENT:   No diplopia or blurred vision.  No earache, sore throat or runny nose.  CARDIOVASCULAR:  No Chest Pain  RESPIRATORY:  No cough, shortness of breath  GASTROINTESTINAL:  No nausea, vomiting + diarrhea.  GENITOURINARY:  No dysuria, frequency or urgency. No Blood in urine  MUSCULOSKELETAL:  no joint aches, no muscle pain  SKIN:  No change in skin, hair or nails.  NEUROLOGIC:  No Headaches      Physical Exam:  GEN: NAD  HEENT: normocephalic and atraumatic.    NECK: Supple.   LUNGS: CTA B/L.  HEART: RRR  ABDOMEN: Soft, NT, ND.  +BS.    : No CVA tenderness  EXTREMITIES: Without  edema.  MSK: No joint swelling  NEUROLOGIC: No Focal Deficits   SKIN: No rash      Vitals:  T(F): 97.7 (25 Jan 2025 12:18), Max: 98.6 (24 Jan 2025 17:23)  HR: 98 (25 Jan 2025 12:18)  BP: 110/74 (25 Jan 2025 12:18)  RR: 18 (25 Jan 2025 12:18)  SpO2: 95% (25 Jan 2025 12:18) (94% - 100%)  temp max in last 48H T(F): , Max: 98.6 (01-24-25 @ 17:23)      Current Antibiotics:  nafcillin  IVPB 2 Gram(s) IV Intermittent every 4 hours    Other medications:  acetylcysteine 10%  Inhalation 4 milliLiter(s) Inhalation every 6 hours  aminocaproic acid Tablet 4 Gram(s) Oral every 6 hours  chlorhexidine 2% Cloths 1 Application(s) Topical daily  dextrose 5%. 1000 milliLiter(s) IV Continuous <Continuous>  dextrose 5%. 1000 milliLiter(s) IV Continuous <Continuous>  dextrose 50% Injectable 25 Gram(s) IV Push once  dextrose 50% Injectable 12.5 Gram(s) IV Push once  dextrose 50% Injectable 25 Gram(s) IV Push once  FLUoxetine 80 milliGRAM(s) Oral at bedtime  glucagon  Injectable 1 milliGRAM(s) IntraMuscular once  hydrocortisone 1% Cream 1 Application(s) Topical daily  hydrocortisone 2.5% Rectal Cream 1 Application(s) Rectal two times a day  HYDROmorphone PCA (1 mG/mL) 30 milliLiter(s) PCA Continuous PCA Continuous  insulin lispro (ADMELOG) corrective regimen sliding scale   SubCutaneous three times a day before meals  levalbuterol Inhalation 1.25 milliGRAM(s) Inhalation every 6 hours  lidocaine   4% Patch 1 Patch Transdermal daily  memantine 10 milliGRAM(s) Oral with breakfast  memantine 5 milliGRAM(s) Oral at bedtime  methadone    Tablet 10 milliGRAM(s) Oral at bedtime  methylphenidate 20 milliGRAM(s) Oral <User Schedule>  octreotide  Injectable 100 MICROGram(s) SubCutaneous two times a day  OLANZapine 2.5 milliGRAM(s) Oral at bedtime  pantoprazole  Injectable 40 milliGRAM(s) IV Push every 12 hours  pregabalin 200 milliGRAM(s) Oral every 8 hours  sodium chloride 0.9% with potassium chloride 20 mEq/L 1000 milliLiter(s) IV Continuous <Continuous>                            7.6    5.15  )-----------( 153      ( 25 Jan 2025 06:10 )             25.0     01-25    139  |  103  |  6.4[L]  ----------------------------<  105[H]  3.7   |  26.0  |  0.51    Ca    8.4      25 Jan 2025 06:10  Phos  4.2     01-25  Mg     1.6     01-25    TPro  4.8[L]  /  Alb  2.1[L]  /  TBili  0.4  /  DBili  x   /  AST  18  /  ALT  10  /  AlkPhos  124[H]  01-24    RECENT CULTURES:  01-17 @ 13:02 .Blood BLOOD     No growth at 5 days    01-17 @ 12:58 .Blood BLOOD     No growth at 5 days      WBC Count: 5.15 K/uL (01-25-25 @ 06:10)  WBC Count: 5.25 K/uL (01-24-25 @ 06:15)  WBC Count: 8.37 K/uL (01-23-25 @ 06:54)  WBC Count: 9.45 K/uL (01-22-25 @ 23:42)  WBC Count: 6.30 K/uL (01-22-25 @ 07:38)  WBC Count: 8.60 K/uL (01-21-25 @ 06:58)    Creatinine: 0.51 mg/dL (01-25-25 @ 06:10)  Creatinine: 0.46 mg/dL (01-24-25 @ 06:15)  Creatinine: 0.47 mg/dL (01-23-25 @ 06:54)  Creatinine: 0.49 mg/dL (01-22-25 @ 07:38)  Creatinine: 0.42 mg/dL (01-21-25 @ 06:58)    Procalcitonin: 0.15 ng/mL (01-21-25 @ 06:58)     SARS-CoV-2: NotDetec (01-05-25 @ 10:54)  SARS-CoV-2 Result: NotDetec (01-02-25 @ 19:20)    Influenza AH3 (RapRVP): Detected (01.05.25 @ 10:54)            < from: TTE Limited W or WO Ultrasound Enhancing Agent (01.06.25 @ 12:55) >  CONCLUSIONS:      1. Technically difficult image quality.   2. Left ventricular systolic function is normal with an ejection fraction visually estimated at 65 to 70 %.   3. Normal right ventricular cavity size, with normal wall thickness, and normal right ventricular systolic function.   4. Compared to the transthoracic echocardiogram performed on 7/21/2024, there have been no significant interval changes.    < end of copied text >

## 2025-01-25 NOTE — PROGRESS NOTE ADULT - ASSESSMENT
39y  Female with h/o metastatic breast cancer ER/CO Neg, HER-2 + on chemotherapy (last dose 12/26/24) (mets to lung, liver, spleen, spine, bone and brain), gastritis w/ intermittent episodes of dark stool (follows w/ Dr. Rosado GI and is on PPI and octreotide daily). Patient presented 1/5 with c/o worsening SOB.  She was seen in ED 1/2/25 for for weakness and SOB at which time she was found to have Hb of 5 requiring PRBC transfusion and positive for Flu A and started on tamiflu and was discharged from the ED on 1/3/25.  Her respiratory status has worsened since then w/ productive cough associated with body aches and chills.  Denies sick contacts but has had many frequent hospital visits. Patient has been afebrile, no leukocytosis. Was placed on BIPAP for Worsening respiratory status. continued on Tamiflu. Vancomycn and Zosyn was added. Blood cultures with MSSA. ID input requested.       MSSA bacteremia   Staphylococcus aureus PNA   Influenza A   metastatic breast cancer ER/CO Neg, HER-2 + on chemotherapy   Port in place s/p explant 1/10/25  Stool PCR + Norovirus and Giardia       - Blood cultures 1/5, 1/7, 1/9 reporting MSSA    - Repeat blood cultures 1/10,  1/11/25, 1/17/25 no growth   - Sputum Cx 1/6 Staphylococcus aureus  - Urine Cx 1/5 reporting 10k - 49k Escherichia coli  of ? significance since UA not concerning for UTI   - Stool PCR + Norovirus and Giardia   - RVP/COVID 19 PCR + Influenza A  - S/P Port removal 1/10/25  - CTA Chest reporting No acute pulmonary embolism. Wide spread patchy airspace opacities bilaterally, increased since the prior exam.  - US RUQ reporting No evidence of acute cholecystitis or biliary ductal dilatation.  - Procalcitonin level 2.32 --> 1.37, ordered for the AM   - TTE 1/6 with no veg  - Called cardiology consult for MARYANN if possible, plan for MARYANN Monday AM  - GI eval noted, no plan for EGD   - s/p Port removal 1/10/25  - Patient still with dropping H/H, receiving multiple transfusion weekly  - If no MARYANN done will need 6 weeks of antibiotics from port explant (2/20/25)  - Completed oseltamivir  1/10/25  - Continue Nafcillin 2gm IV [id number]  - For Norovirus, continue hydration and supportive care  - For Giardia, Completed 5 days of Albendazole   - Hold off on PICC/Midline for now unless needed for reasons other than infectious diseases  - Follow up cultures  - Trend Fever  - Trend WBC      Thank you for allowing me to participate in the care of your patient.   Will Follow    Discussed treatment plan with:  Clinical pharmacy, Dr Dunn

## 2025-01-25 NOTE — PROGRESS NOTE ADULT - SUBJECTIVE AND OBJECTIVE BOX
[facility] Division of Hospital Medicine    INTERVAL HISTORY:  Overnight, no acute events.     Patient seen and examined at bedside this morning. Still reports SOB and having pain. Patient denies chest pain, SOB, abd pain, N/V, fever, chills, dysuria or any other complaints.     MEDICATIONS  (STANDING):  acetylcysteine 10%  Inhalation 4 milliLiter(s) Inhalation every 6 hours  aminocaproic acid Tablet 4 Gram(s) Oral every 6 hours  chlorhexidine 2% Cloths 1 Application(s) Topical daily  dextrose 5%. 1000 milliLiter(s) (50 mL/Hr) IV Continuous <Continuous>  dextrose 5%. 1000 milliLiter(s) (100 mL/Hr) IV Continuous <Continuous>  dextrose 50% Injectable 25 Gram(s) IV Push once  dextrose 50% Injectable 12.5 Gram(s) IV Push once  dextrose 50% Injectable 25 Gram(s) IV Push once  FLUoxetine Solution 80 milliGRAM(s) Oral at bedtime  glucagon  Injectable 1 milliGRAM(s) IntraMuscular once  hydrocortisone 1% Cream 1 Application(s) Topical daily  hydrocortisone 2.5% Rectal Cream 1 Application(s) Rectal two times a day  HYDROmorphone PCA (1 mG/mL) 30 milliLiter(s) PCA Continuous PCA Continuous  insulin lispro (ADMELOG) corrective regimen sliding scale   SubCutaneous three times a day before meals  levalbuterol Inhalation 1.25 milliGRAM(s) Inhalation every 6 hours  lidocaine   4% Patch 1 Patch Transdermal daily  memantine 10 milliGRAM(s) Oral with breakfast  memantine 5 milliGRAM(s) Oral at bedtime  methadone    Tablet 10 milliGRAM(s) Oral at bedtime  methylphenidate 20 milliGRAM(s) Oral <User Schedule>  nafcillin  IVPB 2 Gram(s) IV Intermittent every 4 hours  octreotide  Injectable 100 MICROGram(s) SubCutaneous two times a day  OLANZapine 2.5 milliGRAM(s) Oral at bedtime  pantoprazole  Injectable 40 milliGRAM(s) IV Push every 12 hours  predniSONE   Tablet 20 milliGRAM(s) Oral daily  pregabalin 200 milliGRAM(s) Oral every 8 hours  sodium chloride 0.9% with potassium chloride 20 mEq/L 1000 milliLiter(s) (100 mL/Hr) IV Continuous <Continuous>    MEDICATIONS  (PRN):  acetaminophen     Tablet .. 650 milliGRAM(s) Oral every 6 hours PRN Temp greater or equal to 38C (100.4F), Mild Pain (1 - 3)  aluminum hydroxide/magnesium hydroxide/simethicone Suspension 30 milliLiter(s) Oral every 4 hours PRN Dyspepsia  benzonatate 100 milliGRAM(s) Oral every 8 hours PRN Cough  dextrose Oral Gel 15 Gram(s) Oral once PRN Blood Glucose LESS THAN 70 milliGRAM(s)/deciliter  guaifenesin/dextromethorphan Oral Liquid 10 milliLiter(s) Oral every 4 hours PRN Cough  HYDROmorphone  Injectable 0.5 milliGRAM(s) IV Push every 3 hours PRN Severe Pain (7 - 10)  melatonin 3 milliGRAM(s) Oral at bedtime PRN Insomnia  naloxone Injectable 0.1 milliGRAM(s) IV Push every 3 minutes PRN For ANY of the following changes in patient status:  A. RR LESS THAN 10 breaths per minute, B. Oxygen saturation LESS THAN 90%, C. Sedation score of 6  ondansetron Injectable 4 milliGRAM(s) IV Push every 8 hours PRN Nausea and/or Vomiting  sodium chloride 0.65% Nasal 1 Spray(s) Both Nostrils five times a day PRN Nasal Congestion        I&O's Summary    24 Jan 2025 07:01  -  25 Jan 2025 07:00  --------------------------------------------------------  IN: 1200 mL / OUT: 1575 mL / NET: -375 mL    25 Jan 2025 07:01  -  25 Jan 2025 13:45  --------------------------------------------------------  IN: 1350 mL / OUT: 0 mL / NET: 1350 mL        PHYSICAL EXAM:  Vital Signs Last 24 Hrs  T(C): 36.5 (25 Jan 2025 12:18), Max: 37 (24 Jan 2025 17:23)  T(F): 97.7 (25 Jan 2025 12:18), Max: 98.6 (24 Jan 2025 17:23)  HR: 98 (25 Jan 2025 12:18) (80 - 98)  BP: 110/74 (25 Jan 2025 12:18) (99/69 - 123/83)  BP(mean): 82 (24 Jan 2025 16:00) (78 - 82)  RR: 18 (25 Jan 2025 12:18) (17 - 19)  SpO2: 95% (25 Jan 2025 12:18) (94% - 100%)    Parameters below as of 25 Jan 2025 12:18  Patient On (Oxygen Delivery Method): nasal cannula  O2 Flow (L/min): 2        CONSTITUTIONAL: No apparent distress  HEENT: Normocephalic, Atraumatic.   RESPIRATORY:  lungs are clear to auscultation bilaterally  CARDIOVASCULAR: Regular rate and rhythm, 2+ lower extremity edema  ABDOMEN: Soft, non-distended, nontender to palpation, +BS  PSYCH: thoughts linear, affect appropriate  NEUROLOGY: Alert, Oriented x3    LABS:                        7.6    5.15  )-----------( 153      ( 25 Jan 2025 06:10 )             25.0     01-25    139  |  103  |  6.4[L]  ----------------------------<  105[H]  3.7   |  26.0  |  0.51    Ca    8.4      25 Jan 2025 06:10  Phos  4.2     01-25  Mg     1.6     01-25    TPro  4.8[L]  /  Alb  2.1[L]  /  TBili  0.4  /  DBili  x   /  AST  18  /  ALT  10  /  AlkPhos  124[H]  01-24          Urinalysis Basic - ( 25 Jan 2025 06:10 )    Color: x / Appearance: x / SG: x / pH: x  Gluc: 105 mg/dL / Ketone: x  / Bili: x / Urobili: x   Blood: x / Protein: x / Nitrite: x   Leuk Esterase: x / RBC: x / WBC x   Sq Epi: x / Non Sq Epi: x / Bacteria: x        CAPILLARY BLOOD GLUCOSE      POCT Blood Glucose.: 232 mg/dL (25 Jan 2025 11:13)  POCT Blood Glucose.: 132 mg/dL (25 Jan 2025 07:27)  POCT Blood Glucose.: 118 mg/dL (24 Jan 2025 21:54)  POCT Blood Glucose.: 143 mg/dL (24 Jan 2025 17:36)        RADIOLOGY & ADDITIONAL TESTS:  Results Reviewed

## 2025-01-26 LAB
ANION GAP SERPL CALC-SCNC: 10 MMOL/L — SIGNIFICANT CHANGE UP (ref 5–17)
BUN SERPL-MCNC: 6.1 MG/DL — LOW (ref 8–20)
CALCIUM SERPL-MCNC: 8.2 MG/DL — LOW (ref 8.4–10.5)
CHLORIDE SERPL-SCNC: 106 MMOL/L — SIGNIFICANT CHANGE UP (ref 96–108)
CO2 SERPL-SCNC: 24 MMOL/L — SIGNIFICANT CHANGE UP (ref 22–29)
CREAT SERPL-MCNC: 0.55 MG/DL — SIGNIFICANT CHANGE UP (ref 0.5–1.3)
EGFR: 120 ML/MIN/1.73M2 — SIGNIFICANT CHANGE UP
GLUCOSE BLDC GLUCOMTR-MCNC: 140 MG/DL — HIGH (ref 70–99)
GLUCOSE BLDC GLUCOMTR-MCNC: 167 MG/DL — HIGH (ref 70–99)
GLUCOSE BLDC GLUCOMTR-MCNC: 178 MG/DL — HIGH (ref 70–99)
GLUCOSE BLDC GLUCOMTR-MCNC: 74 MG/DL — SIGNIFICANT CHANGE UP (ref 70–99)
GLUCOSE SERPL-MCNC: 118 MG/DL — HIGH (ref 70–99)
HCT VFR BLD CALC: 25 % — LOW (ref 34.5–45)
HGB BLD-MCNC: 7.4 G/DL — LOW (ref 11.5–15.5)
MCHC RBC-ENTMCNC: 29.6 G/DL — LOW (ref 32–36)
MCHC RBC-ENTMCNC: 29.8 PG — SIGNIFICANT CHANGE UP (ref 27–34)
MCV RBC AUTO: 100.8 FL — HIGH (ref 80–100)
PLATELET # BLD AUTO: 146 K/UL — LOW (ref 150–400)
POTASSIUM SERPL-MCNC: 3.7 MMOL/L — SIGNIFICANT CHANGE UP (ref 3.5–5.3)
POTASSIUM SERPL-SCNC: 3.7 MMOL/L — SIGNIFICANT CHANGE UP (ref 3.5–5.3)
RBC # BLD: 2.48 M/UL — LOW (ref 3.8–5.2)
RBC # FLD: 25.2 % — HIGH (ref 10.3–14.5)
SODIUM SERPL-SCNC: 140 MMOL/L — SIGNIFICANT CHANGE UP (ref 135–145)
WBC # BLD: 4.73 K/UL — SIGNIFICANT CHANGE UP (ref 3.8–10.5)
WBC # FLD AUTO: 4.73 K/UL — SIGNIFICANT CHANGE UP (ref 3.8–10.5)

## 2025-01-26 PROCEDURE — 71275 CT ANGIOGRAPHY CHEST: CPT | Mod: 26

## 2025-01-26 PROCEDURE — G0545: CPT

## 2025-01-26 PROCEDURE — 99232 SBSQ HOSP IP/OBS MODERATE 35: CPT

## 2025-01-26 PROCEDURE — 99233 SBSQ HOSP IP/OBS HIGH 50: CPT

## 2025-01-26 RX ADMIN — NAFCILLIN INJECTION 200 GRAM(S): 2 POWDER, FOR SOLUTION INTRAMUSCULAR; INTRAMUSCULAR; INTRAVENOUS at 10:02

## 2025-01-26 RX ADMIN — LIDOCAINE HYDROCHLORIDE 1 PATCH: 30 CREAM TOPICAL at 11:55

## 2025-01-26 RX ADMIN — MEMANTINE HYDROCHLORIDE 5 MILLIGRAM(S): 7 CAPSULE, EXTENDED RELEASE ORAL at 21:54

## 2025-01-26 RX ADMIN — Medication 100 MILLIGRAM(S): at 16:27

## 2025-01-26 RX ADMIN — DEXTROMETHORPHAN HBR AND GUAIFENESIN ORAL SOLUTION 10 MILLILITER(S): 10; 100 LIQUID ORAL at 21:53

## 2025-01-26 RX ADMIN — AMINOCAPROIC ACID 4 GRAM(S): 1000 TABLET ORAL at 10:04

## 2025-01-26 RX ADMIN — Medication 4 MILLILITER(S): at 16:14

## 2025-01-26 RX ADMIN — DEXTROSE MONOHYDRATE, SODIUM CHLORIDE, AND POTASSIUM CHLORIDE 100 MILLILITER(S): 50; 2.25; 2.24 INJECTION, SOLUTION INTRAVENOUS at 11:54

## 2025-01-26 RX ADMIN — DEXTROSE MONOHYDRATE, SODIUM CHLORIDE, AND POTASSIUM CHLORIDE 100 MILLILITER(S): 50; 2.25; 2.24 INJECTION, SOLUTION INTRAVENOUS at 23:02

## 2025-01-26 RX ADMIN — AMINOCAPROIC ACID 4 GRAM(S): 1000 TABLET ORAL at 21:53

## 2025-01-26 RX ADMIN — Medication 20 MILLIGRAM(S): at 10:03

## 2025-01-26 RX ADMIN — DEXTROSE MONOHYDRATE, SODIUM CHLORIDE, AND POTASSIUM CHLORIDE 100 MILLILITER(S): 50; 2.25; 2.24 INJECTION, SOLUTION INTRAVENOUS at 17:46

## 2025-01-26 RX ADMIN — Medication 1.25 MILLIGRAM(S): at 09:32

## 2025-01-26 RX ADMIN — NAFCILLIN INJECTION 200 GRAM(S): 2 POWDER, FOR SOLUTION INTRAMUSCULAR; INTRAMUSCULAR; INTRAVENOUS at 02:01

## 2025-01-26 RX ADMIN — Medication 80 MILLIGRAM(S): at 21:55

## 2025-01-26 RX ADMIN — METHADONE HYDROCHLORIDE 10 MILLIGRAM(S): 5 SOLUTION ORAL at 21:54

## 2025-01-26 RX ADMIN — PREDNISONE 10 MILLIGRAM(S): 5 TABLET ORAL at 05:14

## 2025-01-26 RX ADMIN — ANTISEPTIC SURGICAL SCRUB 1 APPLICATION(S): 0.04 SOLUTION TOPICAL at 11:54

## 2025-01-26 RX ADMIN — Medication 1.25 MILLIGRAM(S): at 20:51

## 2025-01-26 RX ADMIN — OLANZAPINE 2.5 MILLIGRAM(S): 10 TABLET, FILM COATED ORAL at 21:55

## 2025-01-26 RX ADMIN — HYDROMORPHONE HYDROCHLORIDE 30 MILLILITER(S): 4 INJECTION, SOLUTION INTRAMUSCULAR; INTRAVENOUS; SUBCUTANEOUS at 19:28

## 2025-01-26 RX ADMIN — LIDOCAINE HYDROCHLORIDE 1 PATCH: 30 CREAM TOPICAL at 20:43

## 2025-01-26 RX ADMIN — ACETAMINOPHEN, DIPHENHYDRAMINE HCL, PHENYLEPHRINE HCL 3 MILLIGRAM(S): 325; 25; 5 TABLET ORAL at 21:54

## 2025-01-26 RX ADMIN — HYDROMORPHONE HYDROCHLORIDE 0.5 MILLIGRAM(S): 4 INJECTION, SOLUTION INTRAMUSCULAR; INTRAVENOUS; SUBCUTANEOUS at 21:53

## 2025-01-26 RX ADMIN — Medication 1: at 11:53

## 2025-01-26 RX ADMIN — PREGABALIN CAPSULES, CV 200 MILLIGRAM(S): 225 CAPSULE ORAL at 21:55

## 2025-01-26 RX ADMIN — OCTREOTIDE ACETATE 100 MICROGRAM(S): 1000 INJECTION INTRAVENOUS; SUBCUTANEOUS at 05:14

## 2025-01-26 RX ADMIN — AMINOCAPROIC ACID 4 GRAM(S): 1000 TABLET ORAL at 05:14

## 2025-01-26 RX ADMIN — LIDOCAINE HYDROCHLORIDE 1 PATCH: 30 CREAM TOPICAL at 23:01

## 2025-01-26 RX ADMIN — DEXTROSE MONOHYDRATE, SODIUM CHLORIDE, AND POTASSIUM CHLORIDE 100 MILLILITER(S): 50; 2.25; 2.24 INJECTION, SOLUTION INTRAVENOUS at 06:44

## 2025-01-26 RX ADMIN — Medication 1 APPLICATION(S): at 11:55

## 2025-01-26 RX ADMIN — PANTOPRAZOLE 40 MILLIGRAM(S): 20 TABLET, DELAYED RELEASE ORAL at 05:14

## 2025-01-26 RX ADMIN — MEMANTINE HYDROCHLORIDE 10 MILLIGRAM(S): 7 CAPSULE, EXTENDED RELEASE ORAL at 09:06

## 2025-01-26 RX ADMIN — HYDROMORPHONE HYDROCHLORIDE 30 MILLILITER(S): 4 INJECTION, SOLUTION INTRAMUSCULAR; INTRAVENOUS; SUBCUTANEOUS at 07:29

## 2025-01-26 RX ADMIN — Medication 4 MILLILITER(S): at 09:32

## 2025-01-26 RX ADMIN — HYDROMORPHONE HYDROCHLORIDE 0.5 MILLIGRAM(S): 4 INJECTION, SOLUTION INTRAMUSCULAR; INTRAVENOUS; SUBCUTANEOUS at 09:36

## 2025-01-26 RX ADMIN — PANTOPRAZOLE 40 MILLIGRAM(S): 20 TABLET, DELAYED RELEASE ORAL at 17:47

## 2025-01-26 RX ADMIN — HYDROMORPHONE HYDROCHLORIDE 0.5 MILLIGRAM(S): 4 INJECTION, SOLUTION INTRAMUSCULAR; INTRAVENOUS; SUBCUTANEOUS at 16:37

## 2025-01-26 RX ADMIN — NAFCILLIN INJECTION 200 GRAM(S): 2 POWDER, FOR SOLUTION INTRAMUSCULAR; INTRAMUSCULAR; INTRAVENOUS at 17:47

## 2025-01-26 RX ADMIN — ONDANSETRON 4 MILLIGRAM(S): 4 TABLET, ORALLY DISINTEGRATING ORAL at 17:59

## 2025-01-26 RX ADMIN — Medication 1.25 MILLIGRAM(S): at 16:14

## 2025-01-26 RX ADMIN — OCTREOTIDE ACETATE 100 MICROGRAM(S): 1000 INJECTION INTRAVENOUS; SUBCUTANEOUS at 17:47

## 2025-01-26 RX ADMIN — HYDROMORPHONE HYDROCHLORIDE 0.5 MILLIGRAM(S): 4 INJECTION, SOLUTION INTRAMUSCULAR; INTRAVENOUS; SUBCUTANEOUS at 16:15

## 2025-01-26 RX ADMIN — METHYLPHENIDATE HYDROCHLORIDE 20 MILLIGRAM(S): 36 TABLET, EXTENDED RELEASE ORAL at 09:05

## 2025-01-26 RX ADMIN — AMINOCAPROIC ACID 4 GRAM(S): 1000 TABLET ORAL at 16:16

## 2025-01-26 RX ADMIN — PREGABALIN CAPSULES, CV 200 MILLIGRAM(S): 225 CAPSULE ORAL at 05:14

## 2025-01-26 RX ADMIN — HYDROMORPHONE HYDROCHLORIDE 0.5 MILLIGRAM(S): 4 INJECTION, SOLUTION INTRAMUSCULAR; INTRAVENOUS; SUBCUTANEOUS at 09:50

## 2025-01-26 RX ADMIN — PREGABALIN CAPSULES, CV 200 MILLIGRAM(S): 225 CAPSULE ORAL at 13:20

## 2025-01-26 RX ADMIN — DEXTROMETHORPHAN HBR AND GUAIFENESIN ORAL SOLUTION 10 MILLILITER(S): 10; 100 LIQUID ORAL at 16:27

## 2025-01-26 RX ADMIN — Medication 1 APPLICATION(S): at 06:46

## 2025-01-26 RX ADMIN — Medication 4 MILLILITER(S): at 20:51

## 2025-01-26 RX ADMIN — NAFCILLIN INJECTION 200 GRAM(S): 2 POWDER, FOR SOLUTION INTRAMUSCULAR; INTRAMUSCULAR; INTRAVENOUS at 13:20

## 2025-01-26 RX ADMIN — NAFCILLIN INJECTION 200 GRAM(S): 2 POWDER, FOR SOLUTION INTRAMUSCULAR; INTRAMUSCULAR; INTRAVENOUS at 05:15

## 2025-01-26 RX ADMIN — Medication 1 APPLICATION(S): at 18:31

## 2025-01-26 NOTE — PROGRESS NOTE ADULT - ASSESSMENT
39y  Female with h/o metastatic breast cancer ER/MO Neg, HER-2 + on chemotherapy (last dose 12/26/24) (mets to lung, liver, spleen, spine, bone and brain), gastritis w/ intermittent episodes of dark stool (follows w/ Dr. Rosado GI and is on PPI and octreotide daily). Patient presented 1/5 with c/o worsening SOB.  She was seen in ED 1/2/25 for for weakness and SOB at which time she was found to have Hb of 5 requiring PRBC transfusion and positive for Flu A and started on tamiflu and was discharged from the ED on 1/3/25.  Her respiratory status has worsened since then w/ productive cough associated with body aches and chills.  Denies sick contacts but has had many frequent hospital visits. Patient has been afebrile, no leukocytosis. Was placed on BIPAP for Worsening respiratory status. continued on Tamiflu. Vancomycn and Zosyn was added. Blood cultures with MSSA. ID input requested.       MSSA bacteremia   Staphylococcus aureus PNA   Influenza A   metastatic breast cancer ER/MO Neg, HER-2 + on chemotherapy   Port in place s/p explant 1/10/25  Stool PCR + Norovirus and Giardia       - Blood cultures 1/5, 1/7, 1/9 reporting MSSA    - Repeat blood cultures 1/10,  1/11/25, 1/17/25 no growth   - Sputum Cx 1/6 Staphylococcus aureus  - Urine Cx 1/5 reporting 10k - 49k Escherichia coli  of ? significance since UA not concerning for UTI   - Stool PCR + Norovirus and Giardia   - RVP/COVID 19 PCR + Influenza A  - S/P Port removal 1/10/25  - CTA Chest reporting No acute pulmonary embolism. Wide spread patchy airspace opacities bilaterally, increased since the prior exam.  - US RUQ reporting No evidence of acute cholecystitis or biliary ductal dilatation.  - Procalcitonin level 2.32 --> 1.37, ordered for the AM   - TTE 1/6 with no veg  - Called cardiology consult for MARYANN if possible, plan for MARYANN Monday AM  - GI eval noted, no plan for EGD   - s/p Port removal 1/10/25  - Patient still with dropping H/H, receiving multiple transfusion weekly  - If no MARYANN done will need 6 weeks of antibiotics from port explant (2/20/25); Planned for MARYANN in the AM  - Completed oseltamivir  1/10/25  - Continue Nafcillin 2gm IV [id number]  - For Norovirus, continue hydration and supportive care  - For Giardia, Completed 5 days of Albendazole   - Hold off on PICC/Midline for now unless needed for reasons other than infectious diseases  - Follow up cultures  - Trend Fever  - Trend WBC      Thank you for allowing me to participate in the care of your patient.   Will Follow    Discussed treatment plan with:  Clinical pharmacy

## 2025-01-26 NOTE — PROGRESS NOTE ADULT - SUBJECTIVE AND OBJECTIVE BOX
[facility]  INFECTIOUS DISEASES at [place] / [place] / [place]  =======================================================                              Salazar Toro MD                              Professor Emeritus:  Dr Warner Mcintosh MD            Diplomates American Board of Internal Medicine & Infectious Diseases                                   Tel  [phone] Fax [phone]                                  Hospital Consult line:  [phone]  =======================================================      [place] [id number]    Follow up: MSSA bacteremia    No fevers   Diarrhea improved       Allergies:  pertuzumab (Other (Severe))  Perjeta (Other (Severe))        REVIEW OF SYSTEMS:  CONSTITUTIONAL:  No Fever or chills  HEENT:   No diplopia or blurred vision.  No earache, sore throat or runny nose.  CARDIOVASCULAR:  No Chest Pain  RESPIRATORY:  No cough, shortness of breath  GASTROINTESTINAL:  No nausea, vomiting + diarrhea.  GENITOURINARY:  No dysuria, frequency or urgency. No Blood in urine  MUSCULOSKELETAL:  no joint aches, no muscle pain  SKIN:  No change in skin, hair or nails.  NEUROLOGIC:  No Headaches      Physical Exam:  GEN: NAD  HEENT: normocephalic and atraumatic.    NECK: Supple.   LUNGS: CTA B/L.  HEART: RRR  ABDOMEN: Soft, NT, ND.  +BS.    : No CVA tenderness  EXTREMITIES: Without  edema.  MSK: No joint swelling  NEUROLOGIC: No Focal Deficits   SKIN: No rash      Vitals:  T(F): 98.6 (26 Jan 2025 09:11), Max: 98.6 (26 Jan 2025 09:11)  HR: 86 (26 Jan 2025 09:11)  BP: 111/76 (26 Jan 2025 09:11)  RR: 18 (26 Jan 2025 09:11)  SpO2: 97% (26 Jan 2025 09:11) (95% - 100%)  temp max in last 48H T(F): , Max: 98.6 (01-24-25 @ 17:23)    Current Antibiotics:  nafcillin  IVPB 2 Gram(s) IV Intermittent every 4 hours    Other medications:  acetylcysteine 10%  Inhalation 4 milliLiter(s) Inhalation every 6 hours  aminocaproic acid Tablet 4 Gram(s) Oral every 6 hours  chlorhexidine 2% Cloths 1 Application(s) Topical daily  dextrose 5%. 1000 milliLiter(s) IV Continuous <Continuous>  dextrose 5%. 1000 milliLiter(s) IV Continuous <Continuous>  dextrose 50% Injectable 12.5 Gram(s) IV Push once  dextrose 50% Injectable 25 Gram(s) IV Push once  dextrose 50% Injectable 25 Gram(s) IV Push once  FLUoxetine 80 milliGRAM(s) Oral at bedtime  glucagon  Injectable 1 milliGRAM(s) IntraMuscular once  hydrocortisone 1% Cream 1 Application(s) Topical daily  hydrocortisone 2.5% Rectal Cream 1 Application(s) Rectal two times a day  HYDROmorphone PCA (1 mG/mL) 30 milliLiter(s) PCA Continuous PCA Continuous  insulin lispro (ADMELOG) corrective regimen sliding scale   SubCutaneous three times a day before meals  levalbuterol Inhalation 1.25 milliGRAM(s) Inhalation every 6 hours  lidocaine   4% Patch 1 Patch Transdermal daily  memantine 5 milliGRAM(s) Oral at bedtime  memantine 10 milliGRAM(s) Oral with breakfast  methadone    Tablet 10 milliGRAM(s) Oral at bedtime  methylphenidate 20 milliGRAM(s) Oral <User Schedule>  octreotide  Injectable 100 MICROGram(s) SubCutaneous two times a day  OLANZapine 2.5 milliGRAM(s) Oral at bedtime  pantoprazole  Injectable 40 milliGRAM(s) IV Push every 12 hours  predniSONE   Tablet 10 milliGRAM(s) Oral daily  pregabalin 200 milliGRAM(s) Oral every 8 hours  sodium chloride 0.9% with potassium chloride 20 mEq/L 1000 milliLiter(s) IV Continuous <Continuous>                            7.4    4.73  )-----------( 146      ( 26 Jan 2025 05:56 )             25.0     01-26    140  |  106  |  6.1[L]  ----------------------------<  118[H]  3.7   |  24.0  |  0.55    Ca    8.2[L]      26 Jan 2025 05:56  Phos  4.2     01-25  Mg     1.6     01-25      RECENT CULTURES:  01-17 @ 13:02 .Blood BLOOD     No growth at 5 days    01-17 @ 12:58 .Blood BLOOD     No growth at 5 days      WBC Count: 4.73 K/uL (01-26-25 @ 05:56)  WBC Count: 5.15 K/uL (01-25-25 @ 06:10)  WBC Count: 5.25 K/uL (01-24-25 @ 06:15)  WBC Count: 8.37 K/uL (01-23-25 @ 06:54)  WBC Count: 9.45 K/uL (01-22-25 @ 23:42)  WBC Count: 6.30 K/uL (01-22-25 @ 07:38)    Creatinine: 0.55 mg/dL (01-26-25 @ 05:56)  Creatinine: 0.51 mg/dL (01-25-25 @ 06:10)  Creatinine: 0.46 mg/dL (01-24-25 @ 06:15)  Creatinine: 0.47 mg/dL (01-23-25 @ 06:54)  Creatinine: 0.49 mg/dL (01-22-25 @ 07:38)    Procalcitonin: 0.15 ng/mL (01-21-25 @ 06:58)     SARS-CoV-2: NotDetec (01-05-25 @ 10:54)  SARS-CoV-2 Result: NotDetec (01-02-25 @ 19:20)    Influenza AH3 (RapRVP): Detected (01.05.25 @ 10:54)        < from: TTE Limited W or WO Ultrasound Enhancing Agent (01.06.25 @ 12:55) >  CONCLUSIONS:      1. Technically difficult image quality.   2. Left ventricular systolic function is normal with an ejection fraction visually estimated at 65 to 70 %.   3. Normal right ventricular cavity size, with normal wall thickness, and normal right ventricular systolic function.   4. Compared to the transthoracic echocardiogram performed on 7/21/2024, there have been no significant interval changes.    < end of copied text >

## 2025-01-26 NOTE — PROGRESS NOTE ADULT - ASSESSMENT
39yoF w/ PMHx of metastatic breast cancer ER/IL Neg, HER-2 + on chemotherapy (last dose 12/26/24) (mets to lung, liver, spleen, spine, bone and brain), gastritis w/ intermittent episodes of dark stool (follows w/ Dr. Rosado GI and is on PPI and octreotide daily). Patient presented 1/5 with c/o worsening SOB. She was seen in ED 1/2/25 for weakness and SOB at which time she was found to have Hb of 5 requiring PRBC transfusion and positive for Flu A and started on tamiflu and was discharged from the ED on 1/3/25.  Her respiratory status has worsened since then w/ productive cough associated with body aches and chills. Admitted for sepsis and acute hypoxic respiratory failure with flu A s/p HFNC for worsening respiratory status and s/p course of Tamiflu. Vancomycin and Zosyn was added for possible superimposed bacterial pna. Blood cultures with MSSA. Cardiology consultation requested for evaluation of endocarditis. Blood cultures 1/5, 1/7, 1/9 reporting MSSA , Repeat blood cultures ngtd on 1/11/25, Sputum Cx 1/6 Staphylococcus aureus. MARYANN hold for active gib requring transfusion. Pt had large BM likley diverticular bleed. Had recent scopy. Gi following. ct abd/pelvis w/iv contrast no active bleed (1/13). Urine Cx 1/5 reporting 10k - 49k Escherichia coli  of ? significance  since UA not concerning for UTI.  RVP/COVID 19 PCR + Influenza A.GI PCR  positive norovirus,Giardia.    #Acute blood loss anemia   #GIB  - s/p multiple transfusions during this hospital stay  - goal hgb of 7   - CTA abd/pelvis  no active bleed   - PPI/OCTRIO  - GI eval with no plan for scopes 2/2 recently done, pt has had multiple EGD/colonoscopies in past as well.   - per GI, no plan for any endoscopy, pt will likely be transfusion dependent, need to arrange outpatient vs inpatient transfusions  - hem/onc following   - hem/onc started Amicar on 1/16  - monitor BMs, Hgb    #MSSA bacteremia  - blood clx positive 1/5, repeat bl c/s (1/11) ngtd  - ID following  - Continue nafcillin (would need to be 6 weeks duration if no MARYANN done)  - Cardiology following, planning for MARYANN on 1/27    #Sepsis 2/2 Flu A w/ superimposed multifocal PNA  #Acute hypoxic respiratory failure 2/2 Flu A w/ superimposed multifocal PNA   - on NC, comfortable  - persistently positive RVP for flu A  - s/p tamiflu   - repeat CT 1/8 shows improved pleural effusion but worsening GGOs, unclear if infectious or malignant etiology  - repeat CT 1/9 with similar findings  - CT PE to evaluate consolidation and PE given persistent hypoxia pending  - po steroid tapering dose; will change prednisone 20 -> 10mg qd   - c/w abx per ID recs  - sputum clx growing moderate staph aureus   - TTE EF 65-70%, no WMA    #Chronic normocytic anemia 2/2 metastatic breast cancer on chemo and 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   #Thrombocytopenia likely 2/2 sepsis   #Breast cancer ER/IL Neg, HER-2 pos on chemotherapy w/ mets to lung, liver, spleen, spine, bone and brain  - Baseline Hb ranges 8-9    - s/p prbc transfusion on 1/2, 1/8 and 1/9,1/11,1/13,1/14  - trend CBC, transfuse for hgb <7  - c/w home sub q octreotide and IV protonix  - Monitor CBC and transfuse for Hb<7 and plts<20K in setting of sepsis   - [facility] following  - monitor on tele and     #Nororvirus,Giadia gastroenteritis   - GI PCR  positive norovirus,Giadia  - Albendazole per ID (dc on 01/22 -- completed 5 day course)  - ID on board, recs noted    #Breast cancer ER/IL Neg, HER-2 pos on chemotherapy w/ mets to lung, liver, spleen, spine, bone and brain  - c/w pregabalin, methylphenidate (confirmed on ISTOP Reference #: [id number])  - on dilaudid pca pump, will continue this weekend   - pain management reconsulted, adjusted pain regimen 1/24    #Thrombocytopenia  - likely from chemo/sepsis  - will monitor cbc    dvt ppx scd  disposition: still medically active, need to wean off dilaudid pca and establish oral pain regimen. Pending MARYANN Monday. need to establish outpatient blood transfusions 39yoF w/ PMHx of metastatic breast cancer ER/[place] Neg, HER-2 + on chemotherapy (last dose 12/26/24) (mets to lung, liver, spleen, spine, bone and brain), gastritis w/ intermittent episodes of dark stool (follows w/ Dr. Rosado GI and is on PPI and octreotide daily). Patient presented 1/5 with c/o worsening SOB. She was seen in ED 1/2/25 for weakness and SOB at which time she was found to have Hb of 5 requiring PRBC transfusion and positive for Flu A and started on tamiflu and was discharged from the ED on 1/3/25.  Her respiratory status has worsened since then w/ productive cough associated with body aches and chills. Admitted for sepsis and acute hypoxic respiratory failure with flu A s/p HFNC for worsening respiratory status and s/p course of Tamiflu. Vancomycin and Zosyn was added for possible superimposed bacterial pna. Blood cultures with MSSA. Cardiology consultation requested for evaluation of endocarditis. Blood cultures 1/5, 1/7, 1/9 reporting MSSA , Repeat blood cultures ngtd on 1/11/25, Sputum Cx 1/6 Staphylococcus aureus. MARYANN hold for active gib requring transfusion. Pt had large BM likley diverticular bleed. Had recent scopy. Gi following. ct abd/pelvis w/iv contrast no active bleed (1/13). Urine Cx 1/5 reporting 10k - 49k Escherichia coli  of ? significance  since UA not concerning for UTI.  RVP/COVID 19 PCR + Influenza A.GI PCR  positive norovirus,Giardia.    #Acute blood loss anemia   #GIB  - s/p multiple transfusions during this hospital stay  - goal hgb of 7   - CTA abd/pelvis  no active bleed   - PPI/OCTRIO  - GI eval with no plan for scopes 2/2 recently done, pt has had multiple EGD/colonoscopies in past as well.   - per GI, no plan for any endoscopy, pt will likely be transfusion dependent, need to arrange outpatient vs inpatient transfusions  - hem/onc following   - hem/onc started Amicar on 1/16  - monitor BMs, Hgb    #MSSA bacteremia  - blood clx positive 1/5, repeat bl c/s (1/11) ngtd  - ID following  - Continue nafcillin (would need to be 6 weeks duration if no MARYANN done)  - Cardiology following, planning for MARYANN on 1/27    #Sepsis 2/2 Flu A w/ superimposed multifocal PNA  #Acute hypoxic respiratory failure 2/2 Flu A w/ superimposed multifocal PNA   - on NC, comfortable  - persistently positive RVP for flu A  - s/p tamiflu   - repeat CT 1/8 shows improved pleural effusion but worsening GGOs, unclear if infectious or malignant etiology  - repeat CT 1/9 with similar findings  - CT PE to evaluate consolidation and PE given persistent hypoxia pending  - po steroid tapering dose; will change prednisone 20 -> 10mg qd   - c/w abx per ID recs  - sputum clx growing moderate staph aureus   - TTE EF 65-70%, no WMA    #Chronic normocytic anemia 2/2 metastatic breast cancer on chemo and 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   #Thrombocytopenia likely 2/2 sepsis   #Breast cancer ER/WA Neg, HER-2 pos on chemotherapy w/ mets to lung, liver, spleen, spine, bone and brain  - Baseline Hb ranges 8-9    - s/p prbc transfusion on 1/2, 1/8 and 1/9,1/11,1/13,1/14  - trend CBC, transfuse for hgb <7  - c/w home sub q octreotide and IV protonix  - Monitor CBC and transfuse for Hb<7 and plts<20K in setting of sepsis   - [facility] following  - monitor on tele and     #Nororvirus, Giadia gastroenteritis   - GI PCR  positive norovirus,Giadia  - Albendazole per ID (dc on 01/22 -- completed 5 day course)  - ID on board, recs noted  - diarrhea resolved     #Breast cancer ER/WA Neg, HER-2 pos on chemotherapy w/ mets to lung, liver, spleen, spine, bone and brain  - [facility] following, recommending holding all treatment   - c/w pregabalin, methylphenidate (confirmed on ISTOP Reference #: [id number])  - on dilaudid pca pump, will continue this weekend   - pain management reconsulted, adjusted pain regimen 1/24    #Thrombocytopenia  - likely from chemo/sepsis  - will monitor cbc    #LE edema  - b/l duplex negative  - IV lasix today   - leg elevation     dvt ppx scd  disposition: still medically active, need to wean off dilaudid pca and establish oral pain regimen. Pending MARYANN Monday. need to establish outpatient blood transfusions

## 2025-01-26 NOTE — CHART NOTE - NSCHARTNOTEFT_GEN_A_CORE
PA NOTE-MEDICINE    Called by RN due to Pt c/o Left Calf Pain x now.  Pt has + Br Ca with Mets and no AC Prophy due to GI Bleed         B/L LE Venous Dopplers Ordered Urgent   Will Follow Results  Recall for any changes in Pt Status   Will sign out to AM Team to follow PA NOTE-MEDICINE    Called by RN due to Pt c/o Left Calf Pain with increase in Edema x now.  Pt has + Br Ca with Mets and no AC Prophy due to GI Bleed     T(C): 36.6 (26 Jan 2025 18:50), Max: 37 (26 Jan 2025 09:11)  T(F): 97.9 (26 Jan 2025 18:50), Max: 98.6 (26 Jan 2025 09:11)  HR: 97 (26 Jan 2025 18:50) (77 - 101)  BP: 103/66 (26 Jan 2025 18:50) (93/53 - 120/75)  BP(mean): 78 (26 Jan 2025 18:50) (78 - 78)  RR: 18 (26 Jan 2025 18:50) (18 - 18)  SpO2: 97% (26 Jan 2025 18:50) (95% - 98%) ra     Cardiac: S1S2 + RRR  Lungs: CTA B/L   Integument: No Pallor Warm/Dry   Ext:   + 3 edema B/L LE   + 2= Pulses DP B/L  + Sl Pain to Left calf   No Increase in Temp to touch B/L LE     B/L LE Venous Dopplers Ordered Urgent   Will Follow Results  Recall for any changes in Pt Status   Will sign out to AM Team to follow PA NOTE-MEDICINE    Called by RN due to Pt c/o Left Calf Pain with increase in Edema x now.  Pt has + Br Ca with Mets and no AC Prophy due to GI Bleed     T(C): 36.6 (26 Jan 2025 18:50), Max: 37 (26 Jan 2025 09:11)  T(F): 97.9 (26 Jan 2025 18:50), Max: 98.6 (26 Jan 2025 09:11)  HR: 97 (26 Jan 2025 18:50) (77 - 101)  BP: 103/66 (26 Jan 2025 18:50) (93/53 - 120/75)  BP(mean): 78 (26 Jan 2025 18:50) (78 - 78)  RR: 18 (26 Jan 2025 18:50) (18 - 18)  SpO2: 97% (26 Jan 2025 18:50) (95% - 98%) ra     Cardiac: S1S2 + RRR  Lungs: CTA B/L   Integument: No Pallor Warm/Dry   Ext:   + 3 edema B/L LE   + 2= Pulses DP B/L  + Sl Pain to Left calf   No Increase in Temp to touch B/L LE     B/L LE Venous Dopplers Ordered Urgent   Will Follow Results  Recall for any changes in Pt Status   Will sign out to AM Team to follow    ADDENDUM:   B/L LE Venous Dopplers: NEG

## 2025-01-26 NOTE — PROGRESS NOTE ADULT - SUBJECTIVE AND OBJECTIVE BOX
[facility] Division of Hospital Medicine    INTERVAL HISTORY:  Overnight, no acute events.     Patient seen and examined at bedside this morning. Still reports SOB. Patient denies chest pain, SOB, abd pain, N/V, fever, chills, dysuria or any other complaints.     MEDICATIONS  (STANDING):  acetylcysteine 10%  Inhalation 4 milliLiter(s) Inhalation every 6 hours  aminocaproic acid Tablet 4 Gram(s) Oral every 6 hours  chlorhexidine 2% Cloths 1 Application(s) Topical daily  dextrose 5%. 1000 milliLiter(s) (50 mL/Hr) IV Continuous <Continuous>  dextrose 5%. 1000 milliLiter(s) (100 mL/Hr) IV Continuous <Continuous>  dextrose 50% Injectable 12.5 Gram(s) IV Push once  dextrose 50% Injectable 25 Gram(s) IV Push once  dextrose 50% Injectable 25 Gram(s) IV Push once  FLUoxetine 80 milliGRAM(s) Oral at bedtime  glucagon  Injectable 1 milliGRAM(s) IntraMuscular once  hydrocortisone 1% Cream 1 Application(s) Topical daily  hydrocortisone 2.5% Rectal Cream 1 Application(s) Rectal two times a day  HYDROmorphone PCA (1 mG/mL) 30 milliLiter(s) PCA Continuous PCA Continuous  insulin lispro (ADMELOG) corrective regimen sliding scale   SubCutaneous three times a day before meals  levalbuterol Inhalation 1.25 milliGRAM(s) Inhalation every 6 hours  lidocaine   4% Patch 1 Patch Transdermal daily  memantine 5 milliGRAM(s) Oral at bedtime  memantine 10 milliGRAM(s) Oral with breakfast  methadone    Tablet 10 milliGRAM(s) Oral at bedtime  methylphenidate 20 milliGRAM(s) Oral <User Schedule>  nafcillin  IVPB 2 Gram(s) IV Intermittent every 4 hours  octreotide  Injectable 100 MICROGram(s) SubCutaneous two times a day  OLANZapine 2.5 milliGRAM(s) Oral at bedtime  pantoprazole  Injectable 40 milliGRAM(s) IV Push every 12 hours  predniSONE   Tablet 10 milliGRAM(s) Oral daily  pregabalin 200 milliGRAM(s) Oral every 8 hours  sodium chloride 0.9% with potassium chloride 20 mEq/L 1000 milliLiter(s) (100 mL/Hr) IV Continuous <Continuous>    MEDICATIONS  (PRN):  acetaminophen     Tablet .. 650 milliGRAM(s) Oral every 6 hours PRN Temp greater or equal to 38C (100.4F), Mild Pain (1 - 3)  aluminum hydroxide/magnesium hydroxide/simethicone Suspension 30 milliLiter(s) Oral every 4 hours PRN Dyspepsia  benzonatate 100 milliGRAM(s) Oral every 8 hours PRN Cough  dextrose Oral Gel 15 Gram(s) Oral once PRN Blood Glucose LESS THAN 70 milliGRAM(s)/deciliter  guaifenesin/dextromethorphan Oral Liquid 10 milliLiter(s) Oral every 4 hours PRN Cough  HYDROmorphone  Injectable 0.5 milliGRAM(s) IV Push every 3 hours PRN Severe Pain (7 - 10)  melatonin 3 milliGRAM(s) Oral at bedtime PRN Insomnia  naloxone Injectable 0.1 milliGRAM(s) IV Push every 3 minutes PRN For ANY of the following changes in patient status:  A. RR LESS THAN 10 breaths per minute, B. Oxygen saturation LESS THAN 90%, C. Sedation score of 6  ondansetron Injectable 4 milliGRAM(s) IV Push every 8 hours PRN Nausea and/or Vomiting  sodium chloride 0.65% Nasal 1 Spray(s) Both Nostrils five times a day PRN Nasal Congestion        I&O's Summary    25 Jan 2025 07:01  -  26 Jan 2025 07:00  --------------------------------------------------------  IN: 2350 mL / OUT: 0 mL / NET: 2350 mL    26 Jan 2025 07:01  -  26 Jan 2025 09:28  --------------------------------------------------------  IN: 0 mL / OUT: 1000 mL / NET: -1000 mL        PHYSICAL EXAM:  Vital Signs Last 24 Hrs  T(C): 37 (26 Jan 2025 09:11), Max: 37 (26 Jan 2025 09:11)  T(F): 98.6 (26 Jan 2025 09:11), Max: 98.6 (26 Jan 2025 09:11)  HR: 86 (26 Jan 2025 09:11) (77 - 122)  BP: 111/76 (26 Jan 2025 09:11) (102/60 - 111/76)  BP(mean): --  RR: 18 (26 Jan 2025 09:11) (17 - 18)  SpO2: 97% (26 Jan 2025 09:11) (95% - 100%)    Parameters below as of 26 Jan 2025 09:11  Patient On (Oxygen Delivery Method): room air        CONSTITUTIONAL: No apparent distress  HEENT: Normocephalic, Atraumatic.   RESPIRATORY:  lungs are clear to auscultation bilaterally  CARDIOVASCULAR: Regular rate and rhythm, 2+ lower extremity edema  ABDOMEN: Soft, non-distended, nontender to palpation, +BS  PSYCH: thoughts linear, affect appropriate  NEUROLOGY: Alert, Oriented x3    LABS:                        7.4    4.73  )-----------( 146      ( 26 Jan 2025 05:56 )             25.0     01-26    140  |  106  |  6.1[L]  ----------------------------<  118[H]  3.7   |  24.0  |  0.55    Ca    8.2[L]      26 Jan 2025 05:56  Phos  4.2     01-25  Mg     1.6     01-25            Urinalysis Basic - ( 26 Jan 2025 05:56 )    Color: x / Appearance: x / SG: x / pH: x  Gluc: 118 mg/dL / Ketone: x  / Bili: x / Urobili: x   Blood: x / Protein: x / Nitrite: x   Leuk Esterase: x / RBC: x / WBC x   Sq Epi: x / Non Sq Epi: x / Bacteria: x        CAPILLARY BLOOD GLUCOSE      POCT Blood Glucose.: 140 mg/dL (26 Jan 2025 08:14)  POCT Blood Glucose.: 110 mg/dL (25 Jan 2025 21:15)  POCT Blood Glucose.: 135 mg/dL (25 Jan 2025 16:37)  POCT Blood Glucose.: 232 mg/dL (25 Jan 2025 11:13)        RADIOLOGY & ADDITIONAL TESTS:  Results Reviewed   [facility] Division of Hospital Medicine    INTERVAL HISTORY:  Overnight, no acute events.     Patient seen and examined at bedside this morning. Still reports pain, SOB. Also LE edema. Patient denies chest pain, SOB, abd pain, N/V, fever, chills, dysuria or any other complaints.     MEDICATIONS  (STANDING):  acetylcysteine 10%  Inhalation 4 milliLiter(s) Inhalation every 6 hours  aminocaproic acid Tablet 4 Gram(s) Oral every 6 hours  chlorhexidine 2% Cloths 1 Application(s) Topical daily  dextrose 5%. 1000 milliLiter(s) (50 mL/Hr) IV Continuous <Continuous>  dextrose 5%. 1000 milliLiter(s) (100 mL/Hr) IV Continuous <Continuous>  dextrose 50% Injectable 12.5 Gram(s) IV Push once  dextrose 50% Injectable 25 Gram(s) IV Push once  dextrose 50% Injectable 25 Gram(s) IV Push once  FLUoxetine 80 milliGRAM(s) Oral at bedtime  glucagon  Injectable 1 milliGRAM(s) IntraMuscular once  hydrocortisone 1% Cream 1 Application(s) Topical daily  hydrocortisone 2.5% Rectal Cream 1 Application(s) Rectal two times a day  HYDROmorphone PCA (1 mG/mL) 30 milliLiter(s) PCA Continuous PCA Continuous  insulin lispro (ADMELOG) corrective regimen sliding scale   SubCutaneous three times a day before meals  levalbuterol Inhalation 1.25 milliGRAM(s) Inhalation every 6 hours  lidocaine   4% Patch 1 Patch Transdermal daily  memantine 5 milliGRAM(s) Oral at bedtime  memantine 10 milliGRAM(s) Oral with breakfast  methadone    Tablet 10 milliGRAM(s) Oral at bedtime  methylphenidate 20 milliGRAM(s) Oral <User Schedule>  nafcillin  IVPB 2 Gram(s) IV Intermittent every 4 hours  octreotide  Injectable 100 MICROGram(s) SubCutaneous two times a day  OLANZapine 2.5 milliGRAM(s) Oral at bedtime  pantoprazole  Injectable 40 milliGRAM(s) IV Push every 12 hours  predniSONE   Tablet 10 milliGRAM(s) Oral daily  pregabalin 200 milliGRAM(s) Oral every 8 hours  sodium chloride 0.9% with potassium chloride 20 mEq/L 1000 milliLiter(s) (100 mL/Hr) IV Continuous <Continuous>    MEDICATIONS  (PRN):  acetaminophen     Tablet .. 650 milliGRAM(s) Oral every 6 hours PRN Temp greater or equal to 38C (100.4F), Mild Pain (1 - 3)  aluminum hydroxide/magnesium hydroxide/simethicone Suspension 30 milliLiter(s) Oral every 4 hours PRN Dyspepsia  benzonatate 100 milliGRAM(s) Oral every 8 hours PRN Cough  dextrose Oral Gel 15 Gram(s) Oral once PRN Blood Glucose LESS THAN 70 milliGRAM(s)/deciliter  guaifenesin/dextromethorphan Oral Liquid 10 milliLiter(s) Oral every 4 hours PRN Cough  HYDROmorphone  Injectable 0.5 milliGRAM(s) IV Push every 3 hours PRN Severe Pain (7 - 10)  melatonin 3 milliGRAM(s) Oral at bedtime PRN Insomnia  naloxone Injectable 0.1 milliGRAM(s) IV Push every 3 minutes PRN For ANY of the following changes in patient status:  A. RR LESS THAN 10 breaths per minute, B. Oxygen saturation LESS THAN 90%, C. Sedation score of 6  ondansetron Injectable 4 milliGRAM(s) IV Push every 8 hours PRN Nausea and/or Vomiting  sodium chloride 0.65% Nasal 1 Spray(s) Both Nostrils five times a day PRN Nasal Congestion        I&O's Summary    25 Jan 2025 07:01  -  26 Jan 2025 07:00  --------------------------------------------------------  IN: 2350 mL / OUT: 0 mL / NET: 2350 mL    26 Jan 2025 07:01  -  26 Jan 2025 09:28  --------------------------------------------------------  IN: 0 mL / OUT: 1000 mL / NET: -1000 mL        PHYSICAL EXAM:  Vital Signs Last 24 Hrs  T(C): 37 (26 Jan 2025 09:11), Max: 37 (26 Jan 2025 09:11)  T(F): 98.6 (26 Jan 2025 09:11), Max: 98.6 (26 Jan 2025 09:11)  HR: 86 (26 Jan 2025 09:11) (77 - 122)  BP: 111/76 (26 Jan 2025 09:11) (102/60 - 111/76)  BP(mean): --  RR: 18 (26 Jan 2025 09:11) (17 - 18)  SpO2: 97% (26 Jan 2025 09:11) (95% - 100%)    Parameters below as of 26 Jan 2025 09:11  Patient On (Oxygen Delivery Method): room air        CONSTITUTIONAL: No apparent distress  HEENT: Normocephalic, Atraumatic.   RESPIRATORY:  lungs are clear to auscultation bilaterally  CARDIOVASCULAR: Regular rate and rhythm, 2+ lower extremity edema  ABDOMEN: Soft, non-distended, nontender to palpation, +BS  PSYCH: thoughts linear, affect appropriate  NEUROLOGY: Alert, Oriented x3    LABS:                        7.4    4.73  )-----------( 146      ( 26 Jan 2025 05:56 )             25.0     01-26    140  |  106  |  6.1[L]  ----------------------------<  118[H]  3.7   |  24.0  |  0.55    Ca    8.2[L]      26 Jan 2025 05:56  Phos  4.2     01-25  Mg     1.6     01-25            Urinalysis Basic - ( 26 Jan 2025 05:56 )    Color: x / Appearance: x / SG: x / pH: x  Gluc: 118 mg/dL / Ketone: x  / Bili: x / Urobili: x   Blood: x / Protein: x / Nitrite: x   Leuk Esterase: x / RBC: x / WBC x   Sq Epi: x / Non Sq Epi: x / Bacteria: x        CAPILLARY BLOOD GLUCOSE      POCT Blood Glucose.: 140 mg/dL (26 Jan 2025 08:14)  POCT Blood Glucose.: 110 mg/dL (25 Jan 2025 21:15)  POCT Blood Glucose.: 135 mg/dL (25 Jan 2025 16:37)  POCT Blood Glucose.: 232 mg/dL (25 Jan 2025 11:13)        RADIOLOGY & ADDITIONAL TESTS:  Results Reviewed

## 2025-01-27 ENCOUNTER — RESULT REVIEW (OUTPATIENT)
Age: 40
End: 2025-01-27

## 2025-01-27 LAB
ANION GAP SERPL CALC-SCNC: 12 MMOL/L — SIGNIFICANT CHANGE UP (ref 5–17)
BUN SERPL-MCNC: 7.6 MG/DL — LOW (ref 8–20)
CALCIUM SERPL-MCNC: 8.3 MG/DL — LOW (ref 8.4–10.5)
CHLORIDE SERPL-SCNC: 104 MMOL/L — SIGNIFICANT CHANGE UP (ref 96–108)
CO2 SERPL-SCNC: 25 MMOL/L — SIGNIFICANT CHANGE UP (ref 22–29)
CREAT SERPL-MCNC: 0.53 MG/DL — SIGNIFICANT CHANGE UP (ref 0.5–1.3)
EGFR: 121 ML/MIN/1.73M2 — SIGNIFICANT CHANGE UP
GLUCOSE BLDC GLUCOMTR-MCNC: 130 MG/DL — HIGH (ref 70–99)
GLUCOSE BLDC GLUCOMTR-MCNC: 132 MG/DL — HIGH (ref 70–99)
GLUCOSE SERPL-MCNC: 130 MG/DL — HIGH (ref 70–99)
HCT VFR BLD CALC: 29 % — LOW (ref 34.5–45)
HGB BLD-MCNC: 8.7 G/DL — LOW (ref 11.5–15.5)
MCHC RBC-ENTMCNC: 30 G/DL — LOW (ref 32–36)
MCHC RBC-ENTMCNC: 30.1 PG — SIGNIFICANT CHANGE UP (ref 27–34)
MCV RBC AUTO: 100.3 FL — HIGH (ref 80–100)
PLATELET # BLD AUTO: 155 K/UL — SIGNIFICANT CHANGE UP (ref 150–400)
POTASSIUM SERPL-MCNC: 3.9 MMOL/L — SIGNIFICANT CHANGE UP (ref 3.5–5.3)
POTASSIUM SERPL-SCNC: 3.9 MMOL/L — SIGNIFICANT CHANGE UP (ref 3.5–5.3)
RBC # BLD: 2.89 M/UL — LOW (ref 3.8–5.2)
RBC # FLD: 25.5 % — HIGH (ref 10.3–14.5)
SODIUM SERPL-SCNC: 140 MMOL/L — SIGNIFICANT CHANGE UP (ref 135–145)
WBC # BLD: 6.67 K/UL — SIGNIFICANT CHANGE UP (ref 3.8–10.5)
WBC # FLD AUTO: 6.67 K/UL — SIGNIFICANT CHANGE UP (ref 3.8–10.5)

## 2025-01-27 PROCEDURE — 76942 ECHO GUIDE FOR BIOPSY: CPT | Mod: 26,59

## 2025-01-27 PROCEDURE — 76937 US GUIDE VASCULAR ACCESS: CPT | Mod: 26,59

## 2025-01-27 PROCEDURE — 99232 SBSQ HOSP IP/OBS MODERATE 35: CPT

## 2025-01-27 PROCEDURE — 36000 PLACE NEEDLE IN VEIN: CPT

## 2025-01-27 PROCEDURE — 93970 EXTREMITY STUDY: CPT | Mod: 26

## 2025-01-27 PROCEDURE — 99233 SBSQ HOSP IP/OBS HIGH 50: CPT

## 2025-01-27 PROCEDURE — G0545: CPT

## 2025-01-27 RX ORDER — HYDROMORPHONE HYDROCHLORIDE 4 MG/ML
0.25 INJECTION, SOLUTION INTRAMUSCULAR; INTRAVENOUS; SUBCUTANEOUS ONCE
Refills: 0 | Status: DISCONTINUED | OUTPATIENT
Start: 2025-01-27 | End: 2025-01-27

## 2025-01-27 RX ORDER — AMINOCAPROIC ACID 1000 MG/1
3.5 TABLET ORAL EVERY 6 HOURS
Refills: 0 | Status: DISCONTINUED | OUTPATIENT
Start: 2025-01-27 | End: 2025-01-31

## 2025-01-27 RX ADMIN — Medication 1 APPLICATION(S): at 18:35

## 2025-01-27 RX ADMIN — ANTISEPTIC SURGICAL SCRUB 1 APPLICATION(S): 0.04 SOLUTION TOPICAL at 12:50

## 2025-01-27 RX ADMIN — HYDROMORPHONE HYDROCHLORIDE 0.5 MILLIGRAM(S): 4 INJECTION, SOLUTION INTRAMUSCULAR; INTRAVENOUS; SUBCUTANEOUS at 12:49

## 2025-01-27 RX ADMIN — ONDANSETRON 4 MILLIGRAM(S): 4 TABLET, ORALLY DISINTEGRATING ORAL at 17:13

## 2025-01-27 RX ADMIN — PREGABALIN CAPSULES, CV 200 MILLIGRAM(S): 225 CAPSULE ORAL at 16:11

## 2025-01-27 RX ADMIN — Medication 1.25 MILLIGRAM(S): at 15:38

## 2025-01-27 RX ADMIN — Medication 80 MILLIGRAM(S): at 22:27

## 2025-01-27 RX ADMIN — HYDROMORPHONE HYDROCHLORIDE 30 MILLILITER(S): 4 INJECTION, SOLUTION INTRAMUSCULAR; INTRAVENOUS; SUBCUTANEOUS at 19:13

## 2025-01-27 RX ADMIN — HYDROMORPHONE HYDROCHLORIDE 0.5 MILLIGRAM(S): 4 INJECTION, SOLUTION INTRAMUSCULAR; INTRAVENOUS; SUBCUTANEOUS at 03:16

## 2025-01-27 RX ADMIN — HYDROMORPHONE HYDROCHLORIDE 0.5 MILLIGRAM(S): 4 INJECTION, SOLUTION INTRAMUSCULAR; INTRAVENOUS; SUBCUTANEOUS at 17:05

## 2025-01-27 RX ADMIN — AMINOCAPROIC ACID 4 GRAM(S): 1000 TABLET ORAL at 10:33

## 2025-01-27 RX ADMIN — LIDOCAINE HYDROCHLORIDE 1 PATCH: 30 CREAM TOPICAL at 12:50

## 2025-01-27 RX ADMIN — Medication 4 MILLILITER(S): at 20:37

## 2025-01-27 RX ADMIN — MEMANTINE HYDROCHLORIDE 5 MILLIGRAM(S): 7 CAPSULE, EXTENDED RELEASE ORAL at 22:27

## 2025-01-27 RX ADMIN — OCTREOTIDE ACETATE 100 MICROGRAM(S): 1000 INJECTION INTRAVENOUS; SUBCUTANEOUS at 22:31

## 2025-01-27 RX ADMIN — NAFCILLIN INJECTION 200 GRAM(S): 2 POWDER, FOR SOLUTION INTRAMUSCULAR; INTRAMUSCULAR; INTRAVENOUS at 22:31

## 2025-01-27 RX ADMIN — PREGABALIN CAPSULES, CV 200 MILLIGRAM(S): 225 CAPSULE ORAL at 22:32

## 2025-01-27 RX ADMIN — PANTOPRAZOLE 40 MILLIGRAM(S): 20 TABLET, DELAYED RELEASE ORAL at 17:05

## 2025-01-27 RX ADMIN — HYDROMORPHONE HYDROCHLORIDE 0.5 MILLIGRAM(S): 4 INJECTION, SOLUTION INTRAMUSCULAR; INTRAVENOUS; SUBCUTANEOUS at 20:24

## 2025-01-27 RX ADMIN — AMINOCAPROIC ACID 4 GRAM(S): 1000 TABLET ORAL at 05:43

## 2025-01-27 RX ADMIN — HYDROMORPHONE HYDROCHLORIDE 0.25 MILLIGRAM(S): 4 INJECTION, SOLUTION INTRAMUSCULAR; INTRAVENOUS; SUBCUTANEOUS at 05:21

## 2025-01-27 RX ADMIN — PREDNISONE 10 MILLIGRAM(S): 5 TABLET ORAL at 05:40

## 2025-01-27 RX ADMIN — NAFCILLIN INJECTION 200 GRAM(S): 2 POWDER, FOR SOLUTION INTRAMUSCULAR; INTRAMUSCULAR; INTRAVENOUS at 10:32

## 2025-01-27 RX ADMIN — PANTOPRAZOLE 40 MILLIGRAM(S): 20 TABLET, DELAYED RELEASE ORAL at 05:40

## 2025-01-27 RX ADMIN — Medication 1.25 MILLIGRAM(S): at 20:37

## 2025-01-27 RX ADMIN — HYDROMORPHONE HYDROCHLORIDE 30 MILLILITER(S): 4 INJECTION, SOLUTION INTRAMUSCULAR; INTRAVENOUS; SUBCUTANEOUS at 07:19

## 2025-01-27 RX ADMIN — MEMANTINE HYDROCHLORIDE 10 MILLIGRAM(S): 7 CAPSULE, EXTENDED RELEASE ORAL at 10:32

## 2025-01-27 RX ADMIN — OCTREOTIDE ACETATE 100 MICROGRAM(S): 1000 INJECTION INTRAVENOUS; SUBCUTANEOUS at 05:38

## 2025-01-27 RX ADMIN — Medication 1.25 MILLIGRAM(S): at 08:21

## 2025-01-27 RX ADMIN — Medication 4 MILLILITER(S): at 03:41

## 2025-01-27 RX ADMIN — NAFCILLIN INJECTION 200 GRAM(S): 2 POWDER, FOR SOLUTION INTRAMUSCULAR; INTRAMUSCULAR; INTRAVENOUS at 00:10

## 2025-01-27 RX ADMIN — NAFCILLIN INJECTION 200 GRAM(S): 2 POWDER, FOR SOLUTION INTRAMUSCULAR; INTRAMUSCULAR; INTRAVENOUS at 05:40

## 2025-01-27 RX ADMIN — METHYLPHENIDATE HYDROCHLORIDE 20 MILLIGRAM(S): 36 TABLET, EXTENDED RELEASE ORAL at 10:31

## 2025-01-27 RX ADMIN — Medication 4 MILLILITER(S): at 08:26

## 2025-01-27 RX ADMIN — Medication 20 MILLIGRAM(S): at 12:49

## 2025-01-27 RX ADMIN — HYDROMORPHONE HYDROCHLORIDE 30 MILLILITER(S): 4 INJECTION, SOLUTION INTRAMUSCULAR; INTRAVENOUS; SUBCUTANEOUS at 07:26

## 2025-01-27 RX ADMIN — Medication 1.25 MILLIGRAM(S): at 03:40

## 2025-01-27 RX ADMIN — Medication 4 MILLILITER(S): at 15:38

## 2025-01-27 RX ADMIN — METHADONE HYDROCHLORIDE 10 MILLIGRAM(S): 5 SOLUTION ORAL at 22:31

## 2025-01-27 RX ADMIN — OLANZAPINE 2.5 MILLIGRAM(S): 10 TABLET, FILM COATED ORAL at 22:32

## 2025-01-27 RX ADMIN — NAFCILLIN INJECTION 200 GRAM(S): 2 POWDER, FOR SOLUTION INTRAMUSCULAR; INTRAMUSCULAR; INTRAVENOUS at 17:06

## 2025-01-27 RX ADMIN — LIDOCAINE HYDROCHLORIDE 1 PATCH: 30 CREAM TOPICAL at 19:00

## 2025-01-27 RX ADMIN — Medication 1 APPLICATION(S): at 13:16

## 2025-01-27 RX ADMIN — Medication 1 APPLICATION(S): at 05:43

## 2025-01-27 RX ADMIN — AMINOCAPROIC ACID 3.5 GRAM(S): 1000 TABLET ORAL at 18:59

## 2025-01-27 RX ADMIN — NAFCILLIN INJECTION 200 GRAM(S): 2 POWDER, FOR SOLUTION INTRAMUSCULAR; INTRAMUSCULAR; INTRAVENOUS at 02:15

## 2025-01-27 RX ADMIN — DEXTROSE MONOHYDRATE, SODIUM CHLORIDE, AND POTASSIUM CHLORIDE 100 MILLILITER(S): 50; 2.25; 2.24 INJECTION, SOLUTION INTRAVENOUS at 05:55

## 2025-01-27 NOTE — PROGRESS NOTE ADULT - SUBJECTIVE AND OBJECTIVE BOX
[facility]  INFECTIOUS DISEASES at [place] / [place] / [place]  =======================================================                              Salazar Toro MD                              Professor Emeritus:  Dr Warner Mcintosh MD            Diplomates American Board of Internal Medicine & Infectious Diseases                                   Tel  [phone] Fax [phone]                                  Hospital Consult line:  [phone]  =======================================================      [place] [id number]    Follow up: MSSA bacteremia    No fevers   Diarrhea improved       Allergies:  pertuzumab (Other (Severe))  Perjeta (Other (Severe))        REVIEW OF SYSTEMS:  CONSTITUTIONAL:  No Fever or chills  HEENT:   No diplopia or blurred vision.  No earache, sore throat or runny nose.  CARDIOVASCULAR:  No Chest Pain  RESPIRATORY:  No cough, shortness of breath  GASTROINTESTINAL:  No nausea, vomiting + diarrhea.  GENITOURINARY:  No dysuria, frequency or urgency. No Blood in urine  MUSCULOSKELETAL:  no joint aches, no muscle pain  SKIN:  No change in skin, hair or nails.  NEUROLOGIC:  No Headaches      Physical Exam:  GEN: NAD  HEENT: normocephalic and atraumatic.    NECK: Supple.   LUNGS: CTA B/L.  HEART: RRR  ABDOMEN: Soft, NT, ND.  +BS.    : No CVA tenderness  EXTREMITIES: Without  edema.  MSK: No joint swelling  NEUROLOGIC: No Focal Deficits   SKIN: No rash      Vitals:  T(F): 98.6 (27 Jan 2025 11:25), Max: 98.8 (26 Jan 2025 20:30)  HR: 96 (27 Jan 2025 12:45)  BP: 122/82 (27 Jan 2025 12:45)  RR: 18 (27 Jan 2025 11:25)  SpO2: 97% (27 Jan 2025 11:25) (96% - 99%)  temp max in last 48H T(F): , Max: 98.8 (01-26-25 @ 20:30)    Current Antibiotics:  nafcillin  IVPB 2 Gram(s) IV Intermittent every 4 hours    Other medications:  acetylcysteine 10%  Inhalation 4 milliLiter(s) Inhalation every 6 hours  aminocaproic acid Tablet 4 Gram(s) Oral every 6 hours  chlorhexidine 2% Cloths 1 Application(s) Topical daily  dextrose 5%. 1000 milliLiter(s) IV Continuous <Continuous>  dextrose 5%. 1000 milliLiter(s) IV Continuous <Continuous>  dextrose 50% Injectable 25 Gram(s) IV Push once  dextrose 50% Injectable 12.5 Gram(s) IV Push once  dextrose 50% Injectable 25 Gram(s) IV Push once  FLUoxetine 80 milliGRAM(s) Oral at bedtime  glucagon  Injectable 1 milliGRAM(s) IntraMuscular once  hydrocortisone 1% Cream 1 Application(s) Topical daily  hydrocortisone 2.5% Rectal Cream 1 Application(s) Rectal two times a day  HYDROmorphone PCA (1 mG/mL) 30 milliLiter(s) PCA Continuous PCA Continuous  insulin lispro (ADMELOG) corrective regimen sliding scale   SubCutaneous three times a day before meals  levalbuterol Inhalation 1.25 milliGRAM(s) Inhalation every 6 hours  lidocaine   4% Patch 1 Patch Transdermal daily  memantine 5 milliGRAM(s) Oral at bedtime  memantine 10 milliGRAM(s) Oral with breakfast  methadone    Tablet 10 milliGRAM(s) Oral at bedtime  methylphenidate 20 milliGRAM(s) Oral <User Schedule>  octreotide  Injectable 100 MICROGram(s) SubCutaneous two times a day  OLANZapine 2.5 milliGRAM(s) Oral at bedtime  pantoprazole  Injectable 40 milliGRAM(s) IV Push every 12 hours  predniSONE   Tablet 10 milliGRAM(s) Oral daily  pregabalin 200 milliGRAM(s) Oral every 8 hours  sodium chloride 0.9% with potassium chloride 20 mEq/L 1000 milliLiter(s) IV Continuous <Continuous>               8.7    6.67  )-----------( 155      ( 27 Jan 2025 06:18 )             29.0     01-27    140  |  104  |  7.6[L]  ----------------------------<  130[H]  3.9   |  25.0  |  0.53    Ca    8.3[L]      27 Jan 2025 06:18      RECENT CULTURES:  01-17 @ 13:02 .Blood BLOOD     No growth at 5 days    01-17 @ 12:58 .Blood BLOOD     No growth at 5 days      WBC Count: 6.67 K/uL (01-27-25 @ 06:18)  WBC Count: 4.73 K/uL (01-26-25 @ 05:56)  WBC Count: 5.15 K/uL (01-25-25 @ 06:10)  WBC Count: 5.25 K/uL (01-24-25 @ 06:15)  WBC Count: 8.37 K/uL (01-23-25 @ 06:54)  WBC Count: 9.45 K/uL (01-22-25 @ 23:42)    Creatinine: 0.53 mg/dL (01-27-25 @ 06:18)  Creatinine: 0.55 mg/dL (01-26-25 @ 05:56)  Creatinine: 0.51 mg/dL (01-25-25 @ 06:10)  Creatinine: 0.46 mg/dL (01-24-25 @ 06:15)  Creatinine: 0.47 mg/dL (01-23-25 @ 06:54)    Procalcitonin: 0.15 ng/mL (01-21-25 @ 06:58)     SARS-CoV-2: NotDetec (01-05-25 @ 10:54)  SARS-CoV-2 Result: NotDetec (01-02-25 @ 19:20)            < from: TTE Limited W or WO Ultrasound Enhancing Agent (01.06.25 @ 12:55) >  CONCLUSIONS:      1. Technically difficult image quality.   2. Left ventricular systolic function is normal with an ejection fraction visually estimated at 65 to 70 %.   3. Normal right ventricular cavity size, with normal wall thickness, and normal right ventricular systolic function.   4. Compared to the transthoracic echocardiogram performed on 7/21/2024, there have been no significant interval changes.    < end of copied text >

## 2025-01-27 NOTE — PROGRESS NOTE ADULT - SUBJECTIVE AND OBJECTIVE BOX
Department of Cardiology                                                                  [facility]/[place] [address] [address] [facility]                                                            Telephone: [phone]. Fax:[phone]                                                                                         CARIOLOGY PRE MARYANN NOTE   39y Female here for a MARYANN.     Indication: Bacteremia    HPI: 40 y/o F w/ PMH of metastatic breast cancer ER/PA Neg, HER-2 pos on chemotherapy (mets to lung, liver, spleen, spine, bone and brain), gastritis w/ intermittent episodes of dark stool (follows w/ Dr. Rosado GI and is on PPI and octreotide daily) presented for worsening sob over the past few days.  Pt was seen in ED 1/2/25 for for weakness and sob at which time she was found to have Hb of 5 requiring prbc transfusion and positive for Flu A and started on Tamiflu  Pts respiratory status has worsened since then w/ productive cough but is unable to describe sputum.  Pt reports body aches and chills.  Pt denies sick contacts but has had many frequent hospital visits.  She denies abd pain, N/V, diarrhea, dysuria, cp, palpitations.   (05 Jan 2025 16:40)    Echo: 1/6/2025  ·	Left Ventricle: Left ventricular systolic function is normal with an ejection fraction visually estimated at 65 to 70%.  ·	Right Ventricle: The right ventricular cavity is normal in size, with normal wall thickness and right ventricular systolic function is normal.  ·	Pericardium: No pericardial effusion seen.    Physical Exam:   General: Awake, alert, no acute distress  HEENT: NC, AT, airway patent.  Chest: CTA, RRR                          8.7    6.67  )-----------( 155      ( 27 Jan 2025 06:18 )             29.0     01-27    140  |  104  |  7.6[L]  ----------------------------<  130[H]  3.9   |  25.0  |  0.53    Ca    8.3[L]      27 Jan 2025 06:18    Culture - Blood in AM (01.09.25 @ 04:57)   Growth in aerobic bottle: Staphylococcus aureus  - Clindamycin: S <=0.25  - Erythromycin: S <=0.25  - Gentamicin: S <=4 Should not be used as monotherapy  - Oxacillin: S <=0.25 Oxacillin predicts susceptibility for dicloxacillin, methicillin, and nafcillin  - Penicillin: R >2  - Rifampin: S <=1 Should not be used as monotherapy  - Tetracycline: S <=4  - Trimethoprim/Sulfamethoxazole: S <=0.5/9.5  - Vancomycin: S 1    Culture - Blood (01.10.25 @ 12:20): No growth at 5 days  Culture - Blood in AM (01.11.25 @ 05:20): No growth at 5 days  Culture - Blood in AM (01.11.25 @ 05:25): No growth at 5 days  Culture - Blood (01.17.25 @ 12:58): No growth at 5 days  Culture - Blood (01.17.25 @ 13:02): No growth at 5 days    Assessment and Plan:   The patient was examined and interviewed by me today. There has been no change from the original history and physical except as noted above.    Problem List:   1. Bacteremia  ·	MARYANN to evaluate for vegetation

## 2025-01-27 NOTE — PROGRESS NOTE ADULT - SUBJECTIVE AND OBJECTIVE BOX
39y  Female with h/o metastatic breast cancer ER/AZ Neg, HER-2 + on chemotherapy (last dose 12/26/24) (mets to lung, liver, spleen, spine, bone and brain), gastritis w/ intermittent episodes of dark stool (follows w/ Dr. Rosado GI and is on PPI and octreotide daily). Patient presented 1/5 with c/o worsening SOB.  She was seen in ED 1/2/25 for for weakness and SOB at which time she was found to have Hb of 5 requiring PRBC transfusion and positive for Flu A and started on tamiflu and was discharged from the ED on 1/3/25.  Her respiratory status has worsened since then w/ productive cough associated with body aches and chills.  Denies sick contacts but has had many frequent hospital visits. Patient has been afebrile, no leukocytosis. Was placed on BIPAP for Worsening respiratory status. continued on Tamiflu. Vancomycn and Zosyn was added. Blood cultures with MSSA.     PAST MEDICAL & SURGICAL HISTORY:  H/O compression fracture of spine  Anxiety  Metastatic breast cancer  H/O pleural effusion  Pericardial effusion  S/P tonsillectomy  H/O chest tube placement  12/23/21  S/P pericardiocentesis  12/28/21    Allergies  pertuzumab (Other (Severe))  Perjeta (Other (Severe))    MEDICATIONS  (STANDING):  acetylcysteine 10%  Inhalation 4 milliLiter(s) Inhalation every 6 hours  aminocaproic acid Tablet 4 Gram(s) Oral every 6 hours  chlorhexidine 2% Cloths 1 Application(s) Topical daily  dextrose 5%. 1000 milliLiter(s) (50 mL/Hr) IV Continuous <Continuous>  dextrose 5%. 1000 milliLiter(s) (100 mL/Hr) IV Continuous <Continuous>  dextrose 50% Injectable 25 Gram(s) IV Push once  dextrose 50% Injectable 12.5 Gram(s) IV Push once  dextrose 50% Injectable 25 Gram(s) IV Push once  FLUoxetine Solution 80 milliGRAM(s) Oral at bedtime  glucagon  Injectable 1 milliGRAM(s) IntraMuscular once  hydrocortisone 1% Cream 1 Application(s) Topical daily  hydrocortisone 2.5% Rectal Cream 1 Application(s) Rectal two times a day  HYDROmorphone PCA (1 mG/mL) 30 milliLiter(s) PCA Continuous PCA Continuous  insulin lispro (ADMELOG) corrective regimen sliding scale   SubCutaneous three times a day before meals  levalbuterol Inhalation 1.25 milliGRAM(s) Inhalation every 6 hours  memantine 5 milliGRAM(s) Oral at bedtime  memantine 10 milliGRAM(s) Oral with breakfast  methadone    Tablet 10 milliGRAM(s) Oral at bedtime  methadone    Tablet 5 milliGRAM(s) Oral <User Schedule>  methylphenidate 20 milliGRAM(s) Oral <User Schedule>  nafcillin  IVPB 2 Gram(s) IV Intermittent every 4 hours  octreotide  Injectable 100 MICROGram(s) SubCutaneous two times a day  OLANZapine 2.5 milliGRAM(s) Oral at bedtime  pantoprazole  Injectable 40 milliGRAM(s) IV Push every 12 hours  predniSONE   Tablet 20 milliGRAM(s) Oral daily  pregabalin 200 milliGRAM(s) Oral every 8 hours  sodium chloride 0.9% with potassium chloride 20 mEq/L 1000 milliLiter(s) (100 mL/Hr) IV Continuous <Continuous>    MEDICATIONS  (PRN):  acetaminophen     Tablet .. 650 milliGRAM(s) Oral every 6 hours PRN Temp greater or equal to 38C (100.4F), Mild Pain (1 - 3)  aluminum hydroxide/magnesium hydroxide/simethicone Suspension 30 milliLiter(s) Oral every 4 hours PRN Dyspepsia  benzonatate 100 milliGRAM(s) Oral every 8 hours PRN Cough  dextrose Oral Gel 15 Gram(s) Oral once PRN Blood Glucose LESS THAN 70 milliGRAM(s)/deciliter  guaifenesin/dextromethorphan Oral Liquid 10 milliLiter(s) Oral every 4 hours PRN Cough  HYDROmorphone  Injectable 0.5 milliGRAM(s) IV Push every 3 hours PRN Severe Pain (7 - 10)  melatonin 3 milliGRAM(s) Oral at bedtime PRN Insomnia  naloxone Injectable 0.1 milliGRAM(s) IV Push every 3 minutes PRN For ANY of the following changes in patient status:  A. RR LESS THAN 10 breaths per minute, B. Oxygen saturation LESS THAN 90%, C. Sedation score of 6  ondansetron Injectable 4 milliGRAM(s) IV Push every 8 hours PRN Nausea and/or Vomiting    ICU Vital Signs Last 24 Hrs  T(C): 36.6 (24 Jan 2025 07:54), Max: 36.8 (23 Jan 2025 12:15)  T(F): 97.9 (24 Jan 2025 07:54), Max: 98.2 (23 Jan 2025 12:15)  HR: 78 (24 Jan 2025 08:00) (72 - 102)  BP: 102/61 (24 Jan 2025 08:00) (94/59 - 122/67)  BP(mean): 73 (24 Jan 2025 08:00) (73 - 84)  ABP: --  ABP(mean): --  RR: 19 (24 Jan 2025 08:00) (16 - 19)  SpO2: 99% (24 Jan 2025 08:00) (96% - 100%)    O2 Parameters below as of 24 Jan 2025 08:00  Patient On (Oxygen Delivery Method): nasal cannula  O2 Flow (L/min): 2      PE:  NAD  On O2 NC  bilateral rales  abd soft, nd, nt  a/o x 3      CBC                          8.7    6.67  )-----------( 155      ( 27 Jan 2025 06:18 )             29.0                             7.4    5.25  )-----------( 142      ( 24 Jan 2025 06:15 )             24.6                             8.3    8.37  )-----------( 173      ( 23 Jan 2025 06:54 )             26.6                           7.5    8.60  )-----------( 156      ( 21 Jan 2025 06:58 )             25.2                             8.1    9.59  )-----------( 158      ( 20 Jan 2025 07:25 )             27.1           Chem:       01-27    140  |  104  |  7.6[L]  ----------------------------<  130[H]  3.9   |  25.0  |  0.53    Ca    8.3[L]      27 Jan 2025 06:18        01-21    139  |  105  |  4.5[L]  ----------------------------<  132[H]  3.8   |  24.0  |  0.42[L]    Ca    8.0[L]      21 Jan 2025 06:58    TPro  5.1[L]  /  Alb  2.3[L]  /  TBili  0.5  /  DBili  x   /  AST  19  /  ALT  12  /  AlkPhos  140[H]  01-21 01-20    138  |  103  |  4.9[L]  ----------------------------<  167[H]  3.9   |  25.0  |  0.51    Ca    8.0[L]      20 Jan 2025 07:25    TPro  5.5[L]  /  Alb  2.3[L]  /  TBili  0.6  /  DBili  x   /  AST  19  /  ALT  13  /  AlkPhos  165[H]  01-20 01-17    139  |  104  |  8.7  ----------------------------<  163[H]  2.9[LL]   |  26.0  |  0.70    Ca    7.3[L]      17 Jan 2025 06:45  Phos  4.2     01-17  Mg     1.5     01-17    TPro  5.0[L]  /  Alb  2.1[L]  /  TBili  0.8  /  DBili  0.3  /  AST  17  /  ALT  13  /  AlkPhos  127[H]  01-16      01-15    138  |  102  |  12.3  ----------------------------<  135[H]  3.6   |  25.0  |  0.67    Ca    8.1[L]      15 Cristhian 2025 06:10

## 2025-01-27 NOTE — PROGRESS NOTE ADULT - SUBJECTIVE AND OBJECTIVE BOX
[facility] Division of Hospital Medicine    INTERVAL HISTORY:  Overnight, no acute events.     Patient seen and examined at bedside this morning. Still reports pain, SOB. Also LE edema. Patient denies chest pain, SOB, abd pain, N/V, fever, chills, dysuria or any other complaints.     MEDICATIONS  (STANDING):  acetylcysteine 10%  Inhalation 4 milliLiter(s) Inhalation every 6 hours  aminocaproic acid Tablet 3.5 Gram(s) Oral every 6 hours  chlorhexidine 2% Cloths 1 Application(s) Topical daily  dextrose 5%. 1000 milliLiter(s) (50 mL/Hr) IV Continuous <Continuous>  dextrose 5%. 1000 milliLiter(s) (100 mL/Hr) IV Continuous <Continuous>  dextrose 50% Injectable 25 Gram(s) IV Push once  dextrose 50% Injectable 12.5 Gram(s) IV Push once  dextrose 50% Injectable 25 Gram(s) IV Push once  FLUoxetine 80 milliGRAM(s) Oral at bedtime  glucagon  Injectable 1 milliGRAM(s) IntraMuscular once  hydrocortisone 1% Cream 1 Application(s) Topical daily  hydrocortisone 2.5% Rectal Cream 1 Application(s) Rectal two times a day  HYDROmorphone PCA (1 mG/mL) 30 milliLiter(s) PCA Continuous PCA Continuous  insulin lispro (ADMELOG) corrective regimen sliding scale   SubCutaneous three times a day before meals  levalbuterol Inhalation 1.25 milliGRAM(s) Inhalation every 6 hours  lidocaine   4% Patch 1 Patch Transdermal daily  memantine 5 milliGRAM(s) Oral at bedtime  memantine 10 milliGRAM(s) Oral with breakfast  methadone    Tablet 10 milliGRAM(s) Oral at bedtime  methylphenidate 20 milliGRAM(s) Oral <User Schedule>  nafcillin  IVPB 2 Gram(s) IV Intermittent every 4 hours  octreotide  Injectable 100 MICROGram(s) SubCutaneous two times a day  OLANZapine 2.5 milliGRAM(s) Oral at bedtime  pantoprazole  Injectable 40 milliGRAM(s) IV Push every 12 hours  predniSONE   Tablet 10 milliGRAM(s) Oral daily  pregabalin 200 milliGRAM(s) Oral every 8 hours  sodium chloride 0.9% with potassium chloride 20 mEq/L 1000 milliLiter(s) (100 mL/Hr) IV Continuous <Continuous>    MEDICATIONS  (PRN):  acetaminophen     Tablet .. 650 milliGRAM(s) Oral every 6 hours PRN Temp greater or equal to 38C (100.4F), Mild Pain (1 - 3)  aluminum hydroxide/magnesium hydroxide/simethicone Suspension 30 milliLiter(s) Oral every 4 hours PRN Dyspepsia  benzonatate 100 milliGRAM(s) Oral every 8 hours PRN Cough  dextrose Oral Gel 15 Gram(s) Oral once PRN Blood Glucose LESS THAN 70 milliGRAM(s)/deciliter  guaifenesin/dextromethorphan Oral Liquid 10 milliLiter(s) Oral every 4 hours PRN Cough  HYDROmorphone  Injectable 0.5 milliGRAM(s) IV Push every 3 hours PRN Severe Pain (7 - 10)  melatonin 3 milliGRAM(s) Oral at bedtime PRN Insomnia  naloxone Injectable 0.1 milliGRAM(s) IV Push every 3 minutes PRN For ANY of the following changes in patient status:  A. RR LESS THAN 10 breaths per minute, B. Oxygen saturation LESS THAN 90%, C. Sedation score of 6  ondansetron Injectable 4 milliGRAM(s) IV Push every 8 hours PRN Nausea and/or Vomiting  sodium chloride 0.65% Nasal 1 Spray(s) Both Nostrils five times a day PRN Nasal Congestion        I&O's Summary    26 Jan 2025 07:01  -  27 Jan 2025 07:00  --------------------------------------------------------  IN: 1000 mL / OUT: 2850 mL / NET: -1850 mL        PHYSICAL EXAM:  Vital Signs Last 24 Hrs  T(C): 37 (27 Jan 2025 11:25), Max: 37.1 (26 Jan 2025 20:30)  T(F): 98.6 (27 Jan 2025 11:25), Max: 98.8 (26 Jan 2025 20:30)  HR: 92 (27 Jan 2025 14:45) (80 - 105)  BP: 111/77 (27 Jan 2025 14:45) (101/72 - 122/82)  BP(mean): 78 (26 Jan 2025 18:50) (78 - 78)  RR: 16 (27 Jan 2025 14:45) (15 - 18)  SpO2: 95% (27 Jan 2025 14:45) (94% - 99%)    Parameters below as of 27 Jan 2025 14:45  Patient On (Oxygen Delivery Method): room air      CONSTITUTIONAL: No apparent distress  HEENT: Normocephalic, Atraumatic.   RESPIRATORY:  lungs are clear to auscultation bilaterally  CARDIOVASCULAR: Regular rate and rhythm, 2+ lower extremity edema  ABDOMEN: Soft, non-distended, nontender to palpation, +BS  PSYCH: thoughts linear, affect appropriate  NEUROLOGY: Alert, Oriented x3    LABS:                        8.7    6.67  )-----------( 155      ( 27 Jan 2025 06:18 )             29.0     01-27    140  |  104  |  7.6[L]  ----------------------------<  130[H]  3.9   |  25.0  |  0.53    Ca    8.3[L]      27 Jan 2025 06:18            Urinalysis Basic - ( 27 Jan 2025 06:18 )    Color: x / Appearance: x / SG: x / pH: x  Gluc: 130 mg/dL / Ketone: x  / Bili: x / Urobili: x   Blood: x / Protein: x / Nitrite: x   Leuk Esterase: x / RBC: x / WBC x   Sq Epi: x / Non Sq Epi: x / Bacteria: x        CAPILLARY BLOOD GLUCOSE      POCT Blood Glucose.: 130 mg/dL (27 Jan 2025 08:29)  POCT Blood Glucose.: 167 mg/dL (26 Jan 2025 22:13)        RADIOLOGY & ADDITIONAL TESTS:  Results Reviewed [facility] Division of Hospital Medicine    INTERVAL HISTORY:  Overnight, no acute events.     Patient seen and examined at bedside this morning. Still reports pain. Improvement in LE edema. Patient denies chest pain, SOB, abd pain, N/V, fever, chills, dysuria or any other complaints. Has been NPO since midnight for MARYANN.     MEDICATIONS  (STANDING):  acetylcysteine 10%  Inhalation 4 milliLiter(s) Inhalation every 6 hours  aminocaproic acid Tablet 3.5 Gram(s) Oral every 6 hours  chlorhexidine 2% Cloths 1 Application(s) Topical daily  dextrose 5%. 1000 milliLiter(s) (50 mL/Hr) IV Continuous <Continuous>  dextrose 5%. 1000 milliLiter(s) (100 mL/Hr) IV Continuous <Continuous>  dextrose 50% Injectable 25 Gram(s) IV Push once  dextrose 50% Injectable 12.5 Gram(s) IV Push once  dextrose 50% Injectable 25 Gram(s) IV Push once  FLUoxetine 80 milliGRAM(s) Oral at bedtime  glucagon  Injectable 1 milliGRAM(s) IntraMuscular once  hydrocortisone 1% Cream 1 Application(s) Topical daily  hydrocortisone 2.5% Rectal Cream 1 Application(s) Rectal two times a day  HYDROmorphone PCA (1 mG/mL) 30 milliLiter(s) PCA Continuous PCA Continuous  insulin lispro (ADMELOG) corrective regimen sliding scale   SubCutaneous three times a day before meals  levalbuterol Inhalation 1.25 milliGRAM(s) Inhalation every 6 hours  lidocaine   4% Patch 1 Patch Transdermal daily  memantine 5 milliGRAM(s) Oral at bedtime  memantine 10 milliGRAM(s) Oral with breakfast  methadone    Tablet 10 milliGRAM(s) Oral at bedtime  methylphenidate 20 milliGRAM(s) Oral <User Schedule>  nafcillin  IVPB 2 Gram(s) IV Intermittent every 4 hours  octreotide  Injectable 100 MICROGram(s) SubCutaneous two times a day  OLANZapine 2.5 milliGRAM(s) Oral at bedtime  pantoprazole  Injectable 40 milliGRAM(s) IV Push every 12 hours  predniSONE   Tablet 10 milliGRAM(s) Oral daily  pregabalin 200 milliGRAM(s) Oral every 8 hours  sodium chloride 0.9% with potassium chloride 20 mEq/L 1000 milliLiter(s) (100 mL/Hr) IV Continuous <Continuous>    MEDICATIONS  (PRN):  acetaminophen     Tablet .. 650 milliGRAM(s) Oral every 6 hours PRN Temp greater or equal to 38C (100.4F), Mild Pain (1 - 3)  aluminum hydroxide/magnesium hydroxide/simethicone Suspension 30 milliLiter(s) Oral every 4 hours PRN Dyspepsia  benzonatate 100 milliGRAM(s) Oral every 8 hours PRN Cough  dextrose Oral Gel 15 Gram(s) Oral once PRN Blood Glucose LESS THAN 70 milliGRAM(s)/deciliter  guaifenesin/dextromethorphan Oral Liquid 10 milliLiter(s) Oral every 4 hours PRN Cough  HYDROmorphone  Injectable 0.5 milliGRAM(s) IV Push every 3 hours PRN Severe Pain (7 - 10)  melatonin 3 milliGRAM(s) Oral at bedtime PRN Insomnia  naloxone Injectable 0.1 milliGRAM(s) IV Push every 3 minutes PRN For ANY of the following changes in patient status:  A. RR LESS THAN 10 breaths per minute, B. Oxygen saturation LESS THAN 90%, C. Sedation score of 6  ondansetron Injectable 4 milliGRAM(s) IV Push every 8 hours PRN Nausea and/or Vomiting  sodium chloride 0.65% Nasal 1 Spray(s) Both Nostrils five times a day PRN Nasal Congestion        I&O's Summary    26 Jan 2025 07:01  -  27 Jan 2025 07:00  --------------------------------------------------------  IN: 1000 mL / OUT: 2850 mL / NET: -1850 mL        PHYSICAL EXAM:  Vital Signs Last 24 Hrs  T(C): 37 (27 Jan 2025 11:25), Max: 37.1 (26 Jan 2025 20:30)  T(F): 98.6 (27 Jan 2025 11:25), Max: 98.8 (26 Jan 2025 20:30)  HR: 92 (27 Jan 2025 14:45) (80 - 105)  BP: 111/77 (27 Jan 2025 14:45) (101/72 - 122/82)  BP(mean): 78 (26 Jan 2025 18:50) (78 - 78)  RR: 16 (27 Jan 2025 14:45) (15 - 18)  SpO2: 95% (27 Jan 2025 14:45) (94% - 99%)    Parameters below as of 27 Jan 2025 14:45  Patient On (Oxygen Delivery Method): room air      CONSTITUTIONAL: No apparent distress  HEENT: Normocephalic, Atraumatic.   RESPIRATORY:  lungs are clear to auscultation bilaterally  CARDIOVASCULAR: Regular rate and rhythm, 1+ lower extremity edema  ABDOMEN: Soft, non-distended, nontender to palpation, +BS  PSYCH: thoughts linear, affect appropriate  NEUROLOGY: Alert, Oriented x3    LABS:                        8.7    6.67  )-----------( 155      ( 27 Jan 2025 06:18 )             29.0     01-27    140  |  104  |  7.6[L]  ----------------------------<  130[H]  3.9   |  25.0  |  0.53    Ca    8.3[L]      27 Jan 2025 06:18            Urinalysis Basic - ( 27 Jan 2025 06:18 )    Color: x / Appearance: x / SG: x / pH: x  Gluc: 130 mg/dL / Ketone: x  / Bili: x / Urobili: x   Blood: x / Protein: x / Nitrite: x   Leuk Esterase: x / RBC: x / WBC x   Sq Epi: x / Non Sq Epi: x / Bacteria: x        CAPILLARY BLOOD GLUCOSE      POCT Blood Glucose.: 130 mg/dL (27 Jan 2025 08:29)  POCT Blood Glucose.: 167 mg/dL (26 Jan 2025 22:13)        RADIOLOGY & ADDITIONAL TESTS:  Results Reviewed

## 2025-01-27 NOTE — PROGRESS NOTE ADULT - ASSESSMENT
39yoF w/ PMHx of metastatic breast cancer ER/VA Neg, HER-2 + on chemotherapy (last dose 12/26/24) (mets to lung, liver, spleen, spine, bone and brain), gastritis w/ intermittent episodes of dark stool (follows w/ Dr. Rosado GI and is on PPI and octreotide daily). Patient presented 1/5 with c/o worsening SOB. She was seen in ED 1/2/25 for weakness and SOB at which time she was found to have Hb of 5 requiring PRBC transfusion and positive for Flu A and started on tamiflu and was discharged from the ED on 1/3/25.  Her respiratory status has worsened since then w/ productive cough associated with body aches and chills. Admitted for sepsis and acute hypoxic respiratory failure with flu A s/p HFNC for worsening respiratory status and s/p course of Tamiflu. Vancomycin and Zosyn was added for possible superimposed bacterial pna. Blood cultures with MSSA. Cardiology consultation requested for evaluation of endocarditis. Blood cultures 1/5, 1/7, 1/9 reporting MSSA , Repeat blood cultures ngtd on 1/11/25, Sputum Cx 1/6 Staphylococcus aureus. MARYANN hold for active gib requring transfusion. Pt had large BM likley diverticular bleed. Had recent scopy. Gi following. ct abd/pelvis w/iv contrast no active bleed (1/13). Urine Cx 1/5 reporting 10k - 49k Escherichia coli  of ? significance  since UA not concerning for UTI.  RVP/COVID 19 PCR + Influenza A.GI PCR  positive norovirus,Giardia.    #Acute blood loss anemia   #GIB  - s/p multiple transfusions during this hospital stay  - goal hgb of 7   - CTA abd/pelvis no active bleed   - PPI/OCTRIO  - GI eval with no plan for scopes 2/2 recently done, pt has had multiple EGD/colonoscopies in past as well.   - per GI, no plan for any endoscopy, pt will likely be transfusion dependent, need to arrange outpatient vs inpatient transfusions  - hem/onc following   - hem/onc started Amicar on 1/16  - monitor BMs, Hgb    #MSSA bacteremia  - blood clx positive 1/5, repeat bl c/s (1/11) ngtd  - ID following  - Continue nafcillin (would need to be 6 weeks duration if no MARYANN done)  - Cardiology following, planning for MARYANN on 1/27    #Sepsis 2/2 Flu A w/ superimposed multifocal PNA  #Acute hypoxic respiratory failure 2/2 Flu A w/ superimposed multifocal PNA   - on NC, comfortable  - persistently positive RVP for flu A  - s/p tamiflu   - repeat CT 1/8 shows improved pleural effusion but worsening GGOs, unclear if infectious or malignant etiology  - repeat CT 1/9 with similar findings  - CT PE to evaluate consolidation and PE given persistent hypoxia pending  - po steroid tapering dose; will change prednisone 20 -> 10mg qd   - c/w abx per ID recs  - sputum clx growing moderate staph aureus   - TTE EF 65-70%, no WMA    #Chronic normocytic anemia 2/2 metastatic breast cancer on chemo and 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   #Thrombocytopenia likely 2/2 sepsis   #Breast cancer ER/VA Neg, HER-2 pos on chemotherapy w/ mets to lung, liver, spleen, spine, bone and brain  - Baseline Hb ranges 8-9    - s/p prbc transfusion on 1/2, 1/8 and 1/9,1/11,1/13,1/14  - trend CBC, transfuse for hgb <7  - c/w home sub q octreotide and IV protonix  - Monitor CBC and transfuse for Hb<7 and plts<20K in setting of sepsis   - [facility] following  - monitor on tele and     #Nororvirus, Giadia gastroenteritis   - GI PCR  positive norovirus,Giadia  - Albendazole per ID (dc on 01/22 -- completed 5 day course)  - ID on board, recs noted  - diarrhea resolved     #Breast cancer ER/VA Neg, HER-2 pos on chemotherapy w/ mets to lung, liver, spleen, spine, bone and brain  - [facility] following, recommending holding all treatment   - c/w pregabalin, methylphenidate (confirmed on ISTOP Reference #: [id number])  - on dilaudid pca pump, will continue this weekend   - pain management reconsulted, adjusted pain regimen 1/24    #Thrombocytopenia  - likely from chemo/sepsis  - will monitor cbc    #LE edema  - b/l duplex negative  - IV lasix today   - leg elevation     dvt ppx scd  disposition: still medically active, need to wean off dilaudid pca and establish oral pain regimen. Pending MARYANN Monday. need to establish outpatient blood transfusions   39yoF w/ PMHx of metastatic breast cancer ER/AR Neg, HER-2 + on chemotherapy (last dose 12/26/24) (mets to lung, liver, spleen, spine, bone and brain), gastritis w/ intermittent episodes of dark stool (follows w/ Dr. Rosado GI and is on PPI and octreotide daily). Patient presented 1/5 with c/o worsening SOB. She was seen in ED 1/2/25 for weakness and SOB at which time she was found to have Hb of 5 requiring PRBC transfusion and positive for Flu A and started on tamiflu and was discharged from the ED on 1/3/25.  Her respiratory status has worsened since then w/ productive cough associated with body aches and chills. Admitted for sepsis and acute hypoxic respiratory failure with flu A s/p HFNC for worsening respiratory status and s/p course of Tamiflu. Vancomycin and Zosyn was added for possible superimposed bacterial pna. Blood cultures with MSSA. Cardiology consultation requested for evaluation of endocarditis. Blood cultures 1/5, 1/7, 1/9 reporting MSSA , Repeat blood cultures ngtd on 1/11/25, Sputum Cx 1/6 Staphylococcus aureus. MARYANN hold for active gib requring transfusion. Pt had large BM likley diverticular bleed. Had recent scopy. Gi following. ct abd/pelvis w/iv contrast no active bleed (1/13). Urine Cx 1/5 reporting 10k - 49k Escherichia coli  of ? significance  since UA not concerning for UTI.  RVP/COVID 19 PCR + Influenza A.GI PCR  positive norovirus,Giardia.    #Acute blood loss anemia   #GIB  - s/p multiple transfusions during this hospital stay  - goal hgb of 7   - CTA abd/pelvis no active bleed   - PPI/OCTRIO  - GI eval with no plan for scopes 2/2 recently done, pt has had multiple EGD/colonoscopies in past as well.   - per GI, no plan for any endoscopy, pt will likely be transfusion dependent, need to arrange outpatient vs inpatient transfusions  - hem/onc following   - hem/onc started Amicar on 1/16  - monitor BMs, Hgb    #MSSA bacteremia  - blood clx positive 1/5, repeat bl c/s (1/11) ngtd  - ID following  - Continue nafcillin (would need to be 6 weeks duration if no MARYANN done)  - Cardiology following, planning for MARYANN today    #Sepsis 2/2 Flu A w/ superimposed multifocal PNA  #Acute hypoxic respiratory failure 2/2 Flu A w/ superimposed multifocal PNA   - on NC, comfortable  - persistently positive RVP for flu A  - s/p tamiflu   - repeat CT 1/8 shows improved pleural effusion but worsening GGOs, unclear if infectious or malignant etiology  - repeat CT 1/9 with similar findings  - CT PE to evaluate consolidation and PE given persistent hypoxia negative for PE and with improving consolidations  - po steroid tapering dose; will change prednisone 20 -> 10mg qd   - c/w abx per ID recs  - sputum clx growing moderate staph aureus   - TTE EF 65-70%, no WMA    #Chronic normocytic anemia 2/2 metastatic breast cancer on chemo and 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   #Thrombocytopenia likely 2/2 sepsis   #Breast cancer ER/AR Neg, HER-2 pos on chemotherapy w/ mets to lung, liver, spleen, spine, bone and brain  - Baseline Hb ranges 8-9    - s/p prbc transfusion on 1/2, 1/8 and 1/9,1/11,1/13,1/14  - trend CBC, transfuse for hgb <7  - c/w home sub q octreotide and IV protonix  - Monitor CBC and transfuse for Hb<7 and plts<20K in setting of sepsis   - [facility] following  - monitor on tele and     #Nororvirus, Giadia gastroenteritis   - GI PCR  positive norovirus,Giadia  - Albendazole per ID (dc on 01/22 -- completed 5 day course)  - ID on board, recs noted  - diarrhea resolved     #Breast cancer ER/AR Neg, HER-2 pos on chemotherapy w/ mets to lung, liver, spleen, spine, bone and brain  - [facility] following, recommending holding all treatment   - c/w pregabalin, methylphenidate (confirmed on ISTOP Reference #: [id number])  - on dilaudid pca pump, will continue this weekend   - pain management reconsulted, adjusted pain regimen 1/24    #Thrombocytopenia  - likely from chemo/sepsis  - will monitor cbc    #LE edema, improving   - b/l duplex negative  - IV lasix today as well  - leg elevation     dvt ppx scd  disposition: still medically active, need to wean off dilaudid pca and establish oral pain regimen. Pending MARYANN today. need to establish outpatient blood transfusions

## 2025-01-27 NOTE — PROGRESS NOTE ADULT - ASSESSMENT
39y  Female with h/o metastatic breast cancer ER/RI Neg, HER-2 + on chemotherapy (last dose 12/26/24) (mets to lung, liver, spleen, spine, bone and brain), gastritis w/ intermittent episodes of dark stool (follows w/ Dr. Rosado GI and is on PPI and octreotide daily). Patient presented 1/5 with c/o worsening SOB.  She was seen in ED 1/2/25 for for weakness and SOB at which time she was found to have Hb of 5 requiring PRBC transfusion and positive for Flu A and started on tamiflu and was discharged from the ED on 1/3/25.  Her respiratory status has worsened since then w/ productive cough associated with body aches and chills.  Denies sick contacts but has had many frequent hospital visits. Patient has been afebrile, no leukocytosis. Was placed on BIPAP for Worsening respiratory status. continued on Tamiflu. Vancomycn and Zosyn was added. Blood cultures with MSSA.     Metastatic breast cancer   - last dose of trastuzumab was on 12/26/24.  -All treatment on hold at the moment.  -Follow up with primary oncologist, Dr. Chapa after discharge  - Cont aggressive pain management.   - Plan to DC PCA pump  - transition to PO pain meds     Cytopenias   - secondary to malignancy and acute illness and now GIB  - S/P multiple transfusions  - GI evaluated for large bloody bowel movement  Suspect rectal bleeding is due to diverticular bleed which are self-limiting - If patient becomes hemodynamically unstable, requiring multiple transfusions,   - CT A/P No evidence of active intraluminal extravasation of contrast.   - GI f/u noted.  no plans for scope.  Previously scoped in Nov. 2024. internal hemorrhoids, gastric erosions, no active bleeding site   - c/w home sub q octreotide and IV protonix  - Continue Amicar 4 grams Q 6 H for bleeding- will titrate according to HGB and bleeding. goal of minimal effective dose to prevent bleeding.   Recommend GI reeval for possible scope and treatment of hemorrhoids if feasible since bleeding now minimal   - Hgb 7.4 today,   -Transfuse if hgb<7.0.      Respiratory failure   - multifocal pneumonia and flu+  - S/P oseltamivir    - Management as per primary team.  - TTE 1/6 with no veg  - Cardiology following- . Pending MARYANN Monday    MSSA Bacteremia  - cultures persistently positive. pt afebrile.   --  ID following - Continue Nafcillin 2gm IV [id number]  -- S/P Port removal 1/10/25-  PICC line prior to d/c for outpt chemo   - She is concerned about access. Will place once closer to discharge.  + Noro virus and giardia lamblia  -- For Giardia, Completed 5 days of Albendazole     Will follow                39y  Female with h/o metastatic breast cancer ER/MD Neg, HER-2 + on chemotherapy (last dose 12/26/24) (mets to lung, liver, spleen, spine, bone and brain), gastritis w/ intermittent episodes of dark stool (follows w/ Dr. Rosado GI and is on PPI and octreotide daily). Patient presented 1/5 with c/o worsening SOB.  She was seen in ED 1/2/25 for for weakness and SOB at which time she was found to have Hb of 5 requiring PRBC transfusion and positive for Flu A and started on tamiflu and was discharged from the ED on 1/3/25.  Her respiratory status has worsened since then w/ productive cough associated with body aches and chills.  Denies sick contacts but has had many frequent hospital visits. Patient has been afebrile, no leukocytosis. Was placed on BIPAP for Worsening respiratory status. continued on Tamiflu. Vancomycn and Zosyn was added. Blood cultures with MSSA.     Metastatic breast cancer   - last dose of trastuzumab was on 12/26/24.  -All treatment on hold at the moment.  -Follow up with primary oncologist, Dr. Chapa after discharge  - Cont aggressive pain management.   - Plan to DC PCA pump  - Remains on PCA pump    Cytopenias   - secondary to malignancy and acute illness and now GIB  - S/P multiple transfusions  - GI evaluated for large bloody bowel movement  Suspect rectal bleeding is due to diverticular bleed which are self-limiting - If patient becomes hemodynamically unstable, requiring multiple transfusions,   - CT A/P No evidence of active intraluminal extravasation of contrast.   - GI f/u noted.  no plans for scope.  Previously scoped in Nov. 2024. internal hemorrhoids, gastric erosions, no active bleeding site   - c/w home sub q octreotide and IV protonix  - Has been on  Amicar 4 grams Q 6 H for bleeding with good results  will titrate according to HGB and bleeding. goal of minimal effective dose to prevent bleeding.   - Will decrease to 3.5 grams Q6H today    - Hgb 8.7 today,   -Transfuse if hgb<7.0.      Respiratory failure   - multifocal pneumonia and flu+  - S/P oseltamivir    - Management as per primary team.  - TTE 1/6 with no veg  - Cardiology following- . Pending MARYANN Monday    MSSA Bacteremia  - cultures persistently positive. pt afebrile.   --  ID following - Continue Nafcillin 2gm IV [id number]  -- S/P Port removal 1/10/25-  PICC line prior to d/c for outpt chemo   - She is concerned about access. Will place once closer to discharge.  + Noro virus and giardia lamblia  -- For Giardia, Completed 5 days of Albendazole     Will follow

## 2025-01-27 NOTE — PROGRESS NOTE ADULT - ASSESSMENT
38 yo F with h/o metastatic breast cancer ER/[place] Neg, HER-2 + on chemotherapy (last dose 12/26/24) (mets to lung, liver, spleen, spine, bone and brain).     Recommendations:      - dPCA 0/0.3/8/8    - Dilaudid 0.5mg IVP q3h prn severe BTP ONLY. Hold for sedation or unstable vitals.   - lidocaine   Patch 12 hours on, 12 hours off. Max 3 patches on at one time   - Continue Methadone 5mg po bid at 0600 and 1400   - Continue Methadone 10mg po qHS   - Continue home Lyrica 200mg po q8h        When due for discharge:   - DC Dilaudid PCA   - Recommend starting Dilaudid PO 4/8mg q4h PRN mod/severe pain

## 2025-01-27 NOTE — PROCEDURE NOTE - ADDITIONAL PROCEDURE DETAILS
US Guided 20g IVx2  placed in right upper arm and forearm +flash, good blood return, flushes easily. Patient tolerated procedure well with no immediate complications. Tourniquet removed, all sharps disposed of appropriately, bed rails raised and bed lowered.

## 2025-01-27 NOTE — PROGRESS NOTE ADULT - SUBJECTIVE AND OBJECTIVE BOX
Interval Hx:  Patient seen during rounds  Patient reports pain to be controlled on current medications  Patient denies sedation with medications     Analgesic Dosing for past 24 hours reviewed as below:    FLUoxetine   80 milliGRAM(s) Oral (01-26-25 @ 21:55)    HYDROmorphone  Injectable   0.5 milliGRAM(s) IV Push (01-27-25 @ 12:49)   0.5 milliGRAM(s) IV Push (01-27-25 @ 03:16)   0.5 milliGRAM(s) IV Push (01-26-25 @ 21:53)   0.5 milliGRAM(s) IV Push (01-26-25 @ 16:15)    HYDROmorphone  Injectable   0.25 milliGRAM(s) IV Push (01-27-25 @ 05:21)    melatonin   3 milliGRAM(s) Oral (01-26-25 @ 21:54)    memantine   5 milliGRAM(s) Oral (01-26-25 @ 21:54)    memantine   10 milliGRAM(s) Oral (01-27-25 @ 10:32)    methadone    Tablet   10 milliGRAM(s) Oral (01-26-25 @ 21:54)    methylphenidate   20 milliGRAM(s) Oral (01-27-25 @ 10:31)    OLANZapine   2.5 milliGRAM(s) Oral (01-26-25 @ 21:55)    ondansetron Injectable   4 milliGRAM(s) IV Push (01-26-25 @ 17:59)    pregabalin   200 milliGRAM(s) Oral (01-26-25 @ 21:55)          T(C): 37 (01-27-25 @ 11:25), Max: 37.1 (01-26-25 @ 20:30)  HR: 96 (01-27-25 @ 12:45) (80 - 105)  BP: 122/82 (01-27-25 @ 12:45) (93/53 - 122/82)  RR: 18 (01-27-25 @ 11:25) (18 - 18)  SpO2: 97% (01-27-25 @ 11:25) (96% - 99%)      01-26-25 @ 07:01  -  01-27-25 @ 07:00  --------------------------------------------------------  IN: 1000 mL / OUT: 2850 mL / NET: -1850 mL        acetaminophen     Tablet .. 650 milliGRAM(s) Oral every 6 hours PRN  acetylcysteine 10%  Inhalation 4 milliLiter(s) Inhalation every 6 hours  aluminum hydroxide/magnesium hydroxide/simethicone Suspension 30 milliLiter(s) Oral every 4 hours PRN  aminocaproic acid Tablet 4 Gram(s) Oral every 6 hours  benzonatate 100 milliGRAM(s) Oral every 8 hours PRN  chlorhexidine 2% Cloths 1 Application(s) Topical daily  dextrose 5%. 1000 milliLiter(s) IV Continuous <Continuous>  dextrose 5%. 1000 milliLiter(s) IV Continuous <Continuous>  dextrose 50% Injectable 25 Gram(s) IV Push once  dextrose 50% Injectable 12.5 Gram(s) IV Push once  dextrose 50% Injectable 25 Gram(s) IV Push once  dextrose Oral Gel 15 Gram(s) Oral once PRN  FLUoxetine 80 milliGRAM(s) Oral at bedtime  glucagon  Injectable 1 milliGRAM(s) IntraMuscular once  guaifenesin/dextromethorphan Oral Liquid 10 milliLiter(s) Oral every 4 hours PRN  hydrocortisone 1% Cream 1 Application(s) Topical daily  hydrocortisone 2.5% Rectal Cream 1 Application(s) Rectal two times a day  HYDROmorphone  Injectable 0.5 milliGRAM(s) IV Push every 3 hours PRN  HYDROmorphone PCA (1 mG/mL) 30 milliLiter(s) PCA Continuous PCA Continuous  insulin lispro (ADMELOG) corrective regimen sliding scale   SubCutaneous three times a day before meals  levalbuterol Inhalation 1.25 milliGRAM(s) Inhalation every 6 hours  lidocaine   4% Patch 1 Patch Transdermal daily  melatonin 3 milliGRAM(s) Oral at bedtime PRN  memantine 5 milliGRAM(s) Oral at bedtime  memantine 10 milliGRAM(s) Oral with breakfast  methadone    Tablet 10 milliGRAM(s) Oral at bedtime  methylphenidate 20 milliGRAM(s) Oral <User Schedule>  nafcillin  IVPB 2 Gram(s) IV Intermittent every 4 hours  naloxone Injectable 0.1 milliGRAM(s) IV Push every 3 minutes PRN  octreotide  Injectable 100 MICROGram(s) SubCutaneous two times a day  OLANZapine 2.5 milliGRAM(s) Oral at bedtime  ondansetron Injectable 4 milliGRAM(s) IV Push every 8 hours PRN  pantoprazole  Injectable 40 milliGRAM(s) IV Push every 12 hours  predniSONE   Tablet 10 milliGRAM(s) Oral daily  pregabalin 200 milliGRAM(s) Oral every 8 hours  sodium chloride 0.65% Nasal 1 Spray(s) Both Nostrils five times a day PRN  sodium chloride 0.9% with potassium chloride 20 mEq/L 1000 milliLiter(s) IV Continuous <Continuous>                          8.7    6.67  )-----------( 155      ( 27 Jan 2025 06:18 )             29.0     01-27    140  |  104  |  7.6[L]  ----------------------------<  130[H]  3.9   |  25.0  |  0.53    Ca    8.3[L]      27 Jan 2025 06:18        Urinalysis Basic - ( 27 Jan 2025 06:18 )    Color: x / Appearance: x / SG: x / pH: x  Gluc: 130 mg/dL / Ketone: x  / Bili: x / Urobili: x   Blood: x / Protein: x / Nitrite: x   Leuk Esterase: x / RBC: x / WBC x   Sq Epi: x / Non Sq Epi: x / Bacteria: x        Pain Service   [phone]

## 2025-01-28 LAB
ANION GAP SERPL CALC-SCNC: 11 MMOL/L — SIGNIFICANT CHANGE UP (ref 5–17)
BUN SERPL-MCNC: 7.5 MG/DL — LOW (ref 8–20)
CALCIUM SERPL-MCNC: 8.1 MG/DL — LOW (ref 8.4–10.5)
CHLORIDE SERPL-SCNC: 105 MMOL/L — SIGNIFICANT CHANGE UP (ref 96–108)
CO2 SERPL-SCNC: 26 MMOL/L — SIGNIFICANT CHANGE UP (ref 22–29)
CREAT SERPL-MCNC: 0.58 MG/DL — SIGNIFICANT CHANGE UP (ref 0.5–1.3)
EGFR: 119 ML/MIN/1.73M2 — SIGNIFICANT CHANGE UP
GLUCOSE BLDC GLUCOMTR-MCNC: 131 MG/DL — HIGH (ref 70–99)
GLUCOSE BLDC GLUCOMTR-MCNC: 134 MG/DL — HIGH (ref 70–99)
GLUCOSE BLDC GLUCOMTR-MCNC: 141 MG/DL — HIGH (ref 70–99)
GLUCOSE BLDC GLUCOMTR-MCNC: 87 MG/DL — SIGNIFICANT CHANGE UP (ref 70–99)
GLUCOSE SERPL-MCNC: 152 MG/DL — HIGH (ref 70–99)
HCT VFR BLD CALC: 24.7 % — LOW (ref 34.5–45)
HGB BLD-MCNC: 7.3 G/DL — LOW (ref 11.5–15.5)
MCHC RBC-ENTMCNC: 29.6 G/DL — LOW (ref 32–36)
MCHC RBC-ENTMCNC: 29.6 PG — SIGNIFICANT CHANGE UP (ref 27–34)
MCV RBC AUTO: 100 FL — SIGNIFICANT CHANGE UP (ref 80–100)
PLATELET # BLD AUTO: 134 K/UL — LOW (ref 150–400)
POTASSIUM SERPL-MCNC: 3.5 MMOL/L — SIGNIFICANT CHANGE UP (ref 3.5–5.3)
POTASSIUM SERPL-SCNC: 3.5 MMOL/L — SIGNIFICANT CHANGE UP (ref 3.5–5.3)
RBC # BLD: 2.47 M/UL — LOW (ref 3.8–5.2)
RBC # FLD: 25.1 % — HIGH (ref 10.3–14.5)
SODIUM SERPL-SCNC: 142 MMOL/L — SIGNIFICANT CHANGE UP (ref 135–145)
WBC # BLD: 3.94 K/UL — SIGNIFICANT CHANGE UP (ref 3.8–10.5)
WBC # FLD AUTO: 3.94 K/UL — SIGNIFICANT CHANGE UP (ref 3.8–10.5)

## 2025-01-28 PROCEDURE — 99222 1ST HOSP IP/OBS MODERATE 55: CPT

## 2025-01-28 PROCEDURE — G0545: CPT

## 2025-01-28 PROCEDURE — 99232 SBSQ HOSP IP/OBS MODERATE 35: CPT

## 2025-01-28 RX ADMIN — HYDROMORPHONE HYDROCHLORIDE 0.5 MILLIGRAM(S): 4 INJECTION, SOLUTION INTRAMUSCULAR; INTRAVENOUS; SUBCUTANEOUS at 15:19

## 2025-01-28 RX ADMIN — METHADONE HYDROCHLORIDE 10 MILLIGRAM(S): 5 SOLUTION ORAL at 21:09

## 2025-01-28 RX ADMIN — AMINOCAPROIC ACID 3.5 GRAM(S): 1000 TABLET ORAL at 10:41

## 2025-01-28 RX ADMIN — DEXTROSE MONOHYDRATE, SODIUM CHLORIDE, AND POTASSIUM CHLORIDE 100 MILLILITER(S): 50; 2.25; 2.24 INJECTION, SOLUTION INTRAVENOUS at 00:06

## 2025-01-28 RX ADMIN — PREGABALIN CAPSULES, CV 200 MILLIGRAM(S): 225 CAPSULE ORAL at 05:04

## 2025-01-28 RX ADMIN — PREDNISONE 10 MILLIGRAM(S): 5 TABLET ORAL at 05:04

## 2025-01-28 RX ADMIN — HYDROMORPHONE HYDROCHLORIDE 0.5 MILLIGRAM(S): 4 INJECTION, SOLUTION INTRAMUSCULAR; INTRAVENOUS; SUBCUTANEOUS at 17:53

## 2025-01-28 RX ADMIN — AMINOCAPROIC ACID 3.5 GRAM(S): 1000 TABLET ORAL at 05:02

## 2025-01-28 RX ADMIN — OLANZAPINE 2.5 MILLIGRAM(S): 10 TABLET, FILM COATED ORAL at 21:09

## 2025-01-28 RX ADMIN — HYDROMORPHONE HYDROCHLORIDE 0.5 MILLIGRAM(S): 4 INJECTION, SOLUTION INTRAMUSCULAR; INTRAVENOUS; SUBCUTANEOUS at 00:06

## 2025-01-28 RX ADMIN — HYDROMORPHONE HYDROCHLORIDE 0.5 MILLIGRAM(S): 4 INJECTION, SOLUTION INTRAMUSCULAR; INTRAVENOUS; SUBCUTANEOUS at 22:15

## 2025-01-28 RX ADMIN — OCTREOTIDE ACETATE 100 MICROGRAM(S): 1000 INJECTION INTRAVENOUS; SUBCUTANEOUS at 17:17

## 2025-01-28 RX ADMIN — Medication 80 MILLIGRAM(S): at 21:09

## 2025-01-28 RX ADMIN — Medication 1 APPLICATION(S): at 05:03

## 2025-01-28 RX ADMIN — MEMANTINE HYDROCHLORIDE 5 MILLIGRAM(S): 7 CAPSULE, EXTENDED RELEASE ORAL at 21:09

## 2025-01-28 RX ADMIN — AMINOCAPROIC ACID 3.5 GRAM(S): 1000 TABLET ORAL at 17:17

## 2025-01-28 RX ADMIN — HYDROMORPHONE HYDROCHLORIDE 30 MILLILITER(S): 4 INJECTION, SOLUTION INTRAMUSCULAR; INTRAVENOUS; SUBCUTANEOUS at 19:00

## 2025-01-28 RX ADMIN — LIDOCAINE HYDROCHLORIDE 1 PATCH: 30 CREAM TOPICAL at 00:00

## 2025-01-28 RX ADMIN — NAFCILLIN INJECTION 200 GRAM(S): 2 POWDER, FOR SOLUTION INTRAMUSCULAR; INTRAMUSCULAR; INTRAVENOUS at 17:17

## 2025-01-28 RX ADMIN — Medication 4 MILLILITER(S): at 15:03

## 2025-01-28 RX ADMIN — MEMANTINE HYDROCHLORIDE 10 MILLIGRAM(S): 7 CAPSULE, EXTENDED RELEASE ORAL at 10:41

## 2025-01-28 RX ADMIN — AMINOCAPROIC ACID 3.5 GRAM(S): 1000 TABLET ORAL at 23:14

## 2025-01-28 RX ADMIN — NAFCILLIN INJECTION 200 GRAM(S): 2 POWDER, FOR SOLUTION INTRAMUSCULAR; INTRAMUSCULAR; INTRAVENOUS at 14:30

## 2025-01-28 RX ADMIN — Medication 1.25 MILLIGRAM(S): at 15:02

## 2025-01-28 RX ADMIN — Medication 1.25 MILLIGRAM(S): at 20:13

## 2025-01-28 RX ADMIN — OCTREOTIDE ACETATE 100 MICROGRAM(S): 1000 INJECTION INTRAVENOUS; SUBCUTANEOUS at 05:03

## 2025-01-28 RX ADMIN — HYDROMORPHONE HYDROCHLORIDE 30 MILLILITER(S): 4 INJECTION, SOLUTION INTRAMUSCULAR; INTRAVENOUS; SUBCUTANEOUS at 07:24

## 2025-01-28 RX ADMIN — HYDROMORPHONE HYDROCHLORIDE 0.5 MILLIGRAM(S): 4 INJECTION, SOLUTION INTRAMUSCULAR; INTRAVENOUS; SUBCUTANEOUS at 16:18

## 2025-01-28 RX ADMIN — Medication 100 MILLIGRAM(S): at 21:09

## 2025-01-28 RX ADMIN — NAFCILLIN INJECTION 200 GRAM(S): 2 POWDER, FOR SOLUTION INTRAMUSCULAR; INTRAMUSCULAR; INTRAVENOUS at 05:03

## 2025-01-28 RX ADMIN — NAFCILLIN INJECTION 200 GRAM(S): 2 POWDER, FOR SOLUTION INTRAMUSCULAR; INTRAMUSCULAR; INTRAVENOUS at 02:13

## 2025-01-28 RX ADMIN — NAFCILLIN INJECTION 200 GRAM(S): 2 POWDER, FOR SOLUTION INTRAMUSCULAR; INTRAMUSCULAR; INTRAVENOUS at 10:41

## 2025-01-28 RX ADMIN — AMINOCAPROIC ACID 3.5 GRAM(S): 1000 TABLET ORAL at 00:07

## 2025-01-28 RX ADMIN — PANTOPRAZOLE 40 MILLIGRAM(S): 20 TABLET, DELAYED RELEASE ORAL at 05:03

## 2025-01-28 RX ADMIN — HYDROMORPHONE HYDROCHLORIDE 0.5 MILLIGRAM(S): 4 INJECTION, SOLUTION INTRAMUSCULAR; INTRAVENOUS; SUBCUTANEOUS at 18:13

## 2025-01-28 RX ADMIN — NAFCILLIN INJECTION 200 GRAM(S): 2 POWDER, FOR SOLUTION INTRAMUSCULAR; INTRAMUSCULAR; INTRAVENOUS at 21:09

## 2025-01-28 RX ADMIN — PREGABALIN CAPSULES, CV 200 MILLIGRAM(S): 225 CAPSULE ORAL at 15:19

## 2025-01-28 RX ADMIN — PANTOPRAZOLE 40 MILLIGRAM(S): 20 TABLET, DELAYED RELEASE ORAL at 17:17

## 2025-01-28 RX ADMIN — PREGABALIN CAPSULES, CV 200 MILLIGRAM(S): 225 CAPSULE ORAL at 21:09

## 2025-01-28 RX ADMIN — Medication 4 MILLILITER(S): at 20:13

## 2025-01-28 RX ADMIN — METHYLPHENIDATE HYDROCHLORIDE 20 MILLIGRAM(S): 36 TABLET, EXTENDED RELEASE ORAL at 10:41

## 2025-01-28 RX ADMIN — HYDROMORPHONE HYDROCHLORIDE 0.5 MILLIGRAM(S): 4 INJECTION, SOLUTION INTRAMUSCULAR; INTRAVENOUS; SUBCUTANEOUS at 21:08

## 2025-01-28 NOTE — CONSULT NOTE ADULT - SUBJECTIVE AND OBJECTIVE BOX
Palliative Care Consult  39 year old female PMHx metastatic breast cancer presented for worsening shortness of breath.  Admitted for sepsis ,acute hypoxic respiratory failure with flu A. Was on high flow, bipap, now on nasal cannula. BC + MSSA. GI following for bloody BM/diverticular bleed- no plans for scope (was scoped in 11/2024)  and cardiology, MARYANN neg for vegetations. ID following for MSSA bacteremia, on abx.   Palliative consulted for STAR criteria advance illness    <HPI:  40 y/o F w/ PMH of metastatic breast cancer ER/NE Neg, HER-2 pos on chemotherapy (mets to lung, liver, spleen, spine, bone and brain), gastritis w/ intermittent episodes of dark stool (follows w/ Dr. Rosado GI and is on PPI and octreotide daily) presented for worsening sob over the past few days.  Pt was seen in ED 1/2/25 for for weakness and sob at which time she was found to have Hb of 5 requiring prbc transfusion and positive for Flu A and started on tamiflu.  Pts respiratory status has worsened since then w/ productive cough but is unable to describe sputum.  Pt reports body aches and chills.  Pt denies sick contacts but has had many frequent hospital visits.  She denies abd pain, N/V, diarrhea, dysuria, cp, palpitations.   (05 Jan 2025 16:40)>end of copied text       PERTINENT PMH REVIEWED: Yes     PAST MEDICAL & SURGICAL HISTORY:  H/O compression fracture of spine      Anxiety      Metastatic breast cancer      H/O pleural effusion      Pericardial effusion      S/P tonsillectomy      H/O chest tube placement  12/23/21      S/P pericardiocentesis  12/28/21          SOCIAL HISTORY:                      Substance history:none                    Admitted from:  home                      Jehovah's witness/spirituality:Anglican                    Cultural concerns:none                      Surrogate/HCP/Guardian: Phone#:Arianne Brooks [phone]    FAMILY HISTORY:  FH: CVA (cerebrovascular accident)  No family history related to admission diagonsis    Allergies  pertuzumab (Other (Severe))  Perjeta (Other (Severe))    ADVANCE DIRECTIVES/TREATMENT PREFERENCES:  Full Code    Baseline ADLs (prior to admission):  Independent    Karnofsky/Palliative Performance Status Version 2:  %50  http://npcrc.org/files/news/[email or web address]_performance_scale_[email or web address].pdf    Present Symptoms:   Dyspnea: no  Nausea/Vomiting: No  Anxiety:  No  Depression: No  Fatigue: Yes   Loss of appetite: No  Constipation: no    Pain: pain managed with PCA            Character-            Duration-            Effect-            Factors-            Frequency-            Location-            Severity-    Pain AD Score:0  [email or web address] (press ctrl + left click to view)    Review of Systems: Reviewed                     Negative: no sob  All others negative    MEDICATIONS  (STANDING):  acetylcysteine 10%  Inhalation 4 milliLiter(s) Inhalation every 6 hours  aminocaproic acid Tablet 3.5 Gram(s) Oral every 6 hours  chlorhexidine 2% Cloths 1 Application(s) Topical daily  dextrose 5%. 1000 milliLiter(s) (50 mL/Hr) IV Continuous <Continuous>  dextrose 5%. 1000 milliLiter(s) (100 mL/Hr) IV Continuous <Continuous>  dextrose 50% Injectable 25 Gram(s) IV Push once  dextrose 50% Injectable 12.5 Gram(s) IV Push once  dextrose 50% Injectable 25 Gram(s) IV Push once  FLUoxetine 80 milliGRAM(s) Oral at bedtime  glucagon  Injectable 1 milliGRAM(s) IntraMuscular once  hydrocortisone 1% Cream 1 Application(s) Topical daily  hydrocortisone 2.5% Rectal Cream 1 Application(s) Rectal two times a day  HYDROmorphone PCA (1 mG/mL) 30 milliLiter(s) PCA Continuous PCA Continuous  insulin lispro (ADMELOG) corrective regimen sliding scale   SubCutaneous three times a day before meals  levalbuterol Inhalation 1.25 milliGRAM(s) Inhalation every 6 hours  lidocaine   4% Patch 1 Patch Transdermal daily  memantine 5 milliGRAM(s) Oral at bedtime  memantine 10 milliGRAM(s) Oral with breakfast  methadone    Tablet 10 milliGRAM(s) Oral at bedtime  nafcillin  IVPB 2 Gram(s) IV Intermittent every 4 hours  octreotide  Injectable 100 MICROGram(s) SubCutaneous two times a day  OLANZapine 2.5 milliGRAM(s) Oral at bedtime  pantoprazole  Injectable 40 milliGRAM(s) IV Push every 12 hours  predniSONE   Tablet 10 milliGRAM(s) Oral daily  pregabalin 200 milliGRAM(s) Oral every 8 hours  sodium chloride 0.9% with potassium chloride 20 mEq/L 1000 milliLiter(s) (100 mL/Hr) IV Continuous <Continuous>    MEDICATIONS  (PRN):  acetaminophen     Tablet .. 650 milliGRAM(s) Oral every 6 hours PRN Temp greater or equal to 38C (100.4F), Mild Pain (1 - 3)  aluminum hydroxide/magnesium hydroxide/simethicone Suspension 30 milliLiter(s) Oral every 4 hours PRN Dyspepsia  benzonatate 100 milliGRAM(s) Oral every 8 hours PRN Cough  dextrose Oral Gel 15 Gram(s) Oral once PRN Blood Glucose LESS THAN 70 milliGRAM(s)/deciliter  guaifenesin/dextromethorphan Oral Liquid 10 milliLiter(s) Oral every 4 hours PRN Cough  HYDROmorphone  Injectable 0.5 milliGRAM(s) IV Push every 3 hours PRN Severe Pain (7 - 10)  melatonin 3 milliGRAM(s) Oral at bedtime PRN Insomnia  naloxone Injectable 0.1 milliGRAM(s) IV Push every 3 minutes PRN For ANY of the following changes in patient status:  A. RR LESS THAN 10 breaths per minute, B. Oxygen saturation LESS THAN 90%, C. Sedation score of 6  ondansetron Injectable 4 milliGRAM(s) IV Push every 8 hours PRN Nausea and/or Vomiting  sodium chloride 0.65% Nasal 1 Spray(s) Both Nostrils five times a day PRN Nasal Congestion      PHYSICAL EXAM:    Vital Signs Last 24 Hrs  T(C): 36.5 (28 Jan 2025 11:11), Max: 36.7 (28 Jan 2025 00:07)  T(F): 97.7 (28 Jan 2025 11:11), Max: 98 (28 Jan 2025 00:07)  HR: 69 (28 Jan 2025 11:11) (69 - 789)  BP: 97/60 (28 Jan 2025 11:11) (97/60 - 120/80)  BP(mean): --  RR: 18 (28 Jan 2025 11:11) (15 - 18)  SpO2: 98% (28 Jan 2025 11:11) (93% - 98%)    Parameters below as of 27 Jan 2025 20:37  Patient On (Oxygen Delivery Method): nasal cannula w/ humidification, 2L        General: alert  oriented x _3, in and out of sleep cycle    HEENT: dry mouth    Lungs: comfortable     CV: normal      GI: normal     : normal  i    MSK: weakness  edema    Skin:  no rash    LABS:                        7.3    3.94  )-----------( 134      ( 28 Jan 2025 05:55 )             24.7     01-28    142  |  105  |  7.5[L]  ----------------------------<  152[H]  3.5   |  26.0  |  0.58    Ca    8.1[L]      28 Jan 2025 05:55        Urinalysis Basic - ( 28 Jan 2025 05:55 )    Color: x / Appearance: x / SG: x / pH: x  Gluc: 152 mg/dL / Ketone: x  / Bili: x / Urobili: x   Blood: x / Protein: x / Nitrite: x   Leuk Esterase: x / RBC: x / WBC x   Sq Epi: x / Non Sq Epi: x / Bacteria: x      I&O's Summary      RADIOLOGY & ADDITIONAL STUDIES:    CTA chest PE 01.26.25  IMPRESSION: No pulmonary embolus is noted.    Compared to the previous examination, the patchy opacities noted within   both lungs have decreased.    Findings suggestive of bony metastasis.    Small bilateral pleural effusions, left is loculated.

## 2025-01-28 NOTE — PROGRESS NOTE ADULT - ASSESSMENT
39yoF w/ PMHx of metastatic breast cancer [place] Neg, HER-2 + on chemotherapy (last dose 12/26/24) (mets to lung, liver, spleen, spine, bone and brain), gastritis w/ intermittent episodes of dark stool (follows w/ Dr. Rosado GI and is on PPI and octreotide daily). Patient presented 1/5 with c/o worsening SOB. She was seen in ED 1/2/25 for weakness and SOB at which time she was found to have Hb of 5 requiring PRBC transfusion and positive for Flu A and started on tamiflu and was discharged from the ED on 1/3/25.  Her respiratory status has worsened since then w/ productive cough associated with body aches and chills. Admitted for sepsis and acute hypoxic respiratory failure with flu A s/p HFNC for worsening respiratory status and s/p course of Tamiflu. Vancomycin and Zosyn was added for possible superimposed bacterial pna. Blood cultures with MSSA. Cardiology consultation requested for evaluation of endocarditis. Blood cultures 1/5, 1/7, 1/9 reporting MSSA , Repeat blood cultures ngtd on 1/11/25, Sputum Cx 1/6 Staphylococcus aureus. MARYANN hold for active gib requring transfusion. Pt had large BM likley diverticular bleed. Had recent scopy. Gi following. ct abd/pelvis w/iv contrast no active bleed (1/13). Urine Cx 1/5 reporting 10k - 49k Escherichia coli  of ? significance  since UA not concerning for UTI.  RVP/COVID 19 PCR + Influenza A.GI PCR  positive norovirus,Giardia.    #Acute blood loss anemia   #GIB  - s/p multiple transfusions during this hospital stay  - goal hgb of 7   - CTA abd/pelvis no active bleed   - PPI/OCTRIO  - GI eval with no plan for scopes 2/2 recently done, pt has had multiple EGD/colonoscopies in past as well.   - per GI, no plan for any endoscopy, pt will likely be transfusion dependent  - hem/onc following, pt responding well to amicar, plan to continue outpt, will assess for need for transfusions on outpt basis and will set up as needed   - hem/onc started Amicar on 1/16  - monitor BMs, Hgb    #MSSA bacteremia  - blood clx positive 1/5, repeat bl c/s (1/11) ngtd  - ID following  - Continue nafcillin (would need to be 6 weeks duration if no MARYANN done)  - Cardiology following, planning for MARYANN today    #Sepsis 2/2 Flu A w/ superimposed multifocal PNA  #Acute hypoxic respiratory failure 2/2 Flu A w/ superimposed multifocal PNA   - on NC, comfortable  - persistently positive RVP for flu A  - s/p tamiflu   - repeat CT 1/8 shows improved pleural effusion but worsening GGOs, unclear if infectious or malignant etiology  - repeat CT 1/9 with similar findings  - CT PE to evaluate consolidation and PE given persistent hypoxia negative for PE and with improving consolidations  - po steroid tapering dose; will change prednisone 20 -> 10mg qd   - c/w abx per ID recs  - sputum clx growing moderate staph aureus   - TTE EF 65-70%, no WMA    #Chronic normocytic anemia 2/2 metastatic breast cancer on chemo and 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   #Thrombocytopenia likely 2/2 sepsis   #Breast cancer [place] Neg, HER-2 pos on chemotherapy w/ mets to lung, liver, spleen, spine, bone and brain  - Baseline Hb ranges 8-9    - s/p prbc transfusion on 1/2, 1/8 and 1/9,1/11,1/13,1/14  - trend CBC, transfuse for hgb <7  - c/w home sub q octreotide and IV protonix  - Monitor CBC and transfuse for Hb<7 and plts<20K in setting of sepsis   - [facility] following  - monitor on tele and     #Nororvirus, Giadia gastroenteritis   - GI PCR  positive norovirus,Giadia  - Albendazole per ID (dc on 01/22 -- completed 5 day course)  - ID on board, recs noted  - diarrhea resolved     #Breast cancer [place] Neg, HER-2 pos on chemotherapy w/ mets to lung, liver, spleen, spine, bone and brain  - [facility] following, recommending holding all treatment   - c/w pregabalin, methylphenidate (confirmed on ISTOP Reference #: [id number])  - on dilaudid pca pump, will continue for now  - pain management reconsulted, adjusted pain regimen 1/24    #Thrombocytopenia  - likely from chemo/sepsis  - will monitor cbc    #LE edema, improving   - b/l duplex negative  - IV lasix today as well  - leg elevation     dvt ppx scd  disposition: needs line and IV abx, then stable for d/c home w/ home care  39yoF w/ PMHx of metastatic breast cancer ER/[place] Neg, HER-2 + on chemotherapy (last dose 12/26/24) (mets to lung, liver, spleen, spine, bone and brain), gastritis w/ intermittent episodes of dark stool (follows w/ Dr. Rosado GI and is on PPI and octreotide daily). Patient presented 1/5 with c/o worsening SOB. She was seen in ED 1/2/25 for weakness and SOB at which time she was found to have Hb of 5 requiring PRBC transfusion and positive for Flu A and started on tamiflu and was discharged from the ED on 1/3/25.  Her respiratory status has worsened since then w/ productive cough associated with body aches and chills. Admitted for sepsis and acute hypoxic respiratory failure with flu A s/p HFNC for worsening respiratory status and s/p course of Tamiflu. Vancomycin and Zosyn was added for possible superimposed bacterial pna. Blood cultures with MSSA. Cardiology consultation requested for evaluation of endocarditis. Blood cultures 1/5, 1/7, 1/9 reporting MSSA , Repeat blood cultures ngtd on 1/11/25, Sputum Cx 1/6 Staphylococcus aureus. MARYANN hold for active gib requring transfusion. Pt had large BM likley diverticular bleed. Had recent scopy. Gi following. ct abd/pelvis w/iv contrast no active bleed (1/13). Urine Cx 1/5 reporting 10k - 49k Escherichia coli  of ? significance  since UA not concerning for UTI.  RVP/COVID 19 PCR + Influenza A.GI PCR  positive norovirus,Giardia.    #Acute blood loss anemia   #GIB  - s/p multiple transfusions during this hospital stay  - goal hgb of 7   - CTA abd/pelvis no active bleed   - PPI/OCTRIO  - GI eval with no plan for scopes 2/2 recently done, pt has had multiple EGD/colonoscopies in past as well.   - per GI, no plan for any endoscopy, pt will likely be transfusion dependent  - hem/onc started Amicar on 1/16  - hem/onc following, pt responding well to amicar, plan to continue outpt, will assess need for transfusions on outpt basis and will set up as needed pending ongoing response to amicar   - monitor BMs, Hgb    #MSSA bacteremia  - blood clx positive 1/5, repeat bl c/s (1/11) ngtd  - ID following  - Continue nafcillin (would need to be 6 weeks duration if no MARYANN done)  - Cardiology following, MARYANN w/o IE  - asked ID to order mid v. picc, ID to clarify with heme/onc regarding mid v. picc     #Sepsis 2/2 Flu A w/ superimposed multifocal PNA  #Acute hypoxic respiratory failure 2/2 Flu A w/ superimposed multifocal PNA   - on NC, comfortable  - persistently positive RVP for flu A  - s/p tamiflu   - repeat CT 1/8 shows improved pleural effusion but worsening GGOs, unclear if infectious or malignant etiology  - repeat CT 1/9 with similar findings  - CT PE to evaluate consolidation and PE given persistent hypoxia negative for PE and with improving consolidations  - po steroid tapering dose; will change prednisone 20 -> 10mg qd   - c/w abx per ID recs  - sputum clx growing moderate staph aureus   - TTE EF 65-70%, no WMA  - wean O2     #Chronic normocytic anemia 2/2 metastatic breast cancer on chemo and 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   #Thrombocytopenia likely 2/2 sepsis   #Breast cancer ER/[place] Neg, HER-2 pos on chemotherapy w/ mets to lung, liver, spleen, spine, bone and brain  - Baseline Hb ranges 8-9    - s/p prbc transfusion on 1/2, 1/8 and 1/9,1/11,1/13,1/14  - trend CBC, transfuse for hgb <7  - c/w home sub q octreotide and IV protonix  - Monitor CBC and transfuse for Hb<7 and plts<20K in setting of sepsis   - [facility] following  - monitor on tele and     #Nororvirus, Giadia gastroenteritis   - GI PCR  positive norovirus,Giadia  - Albendazole per ID (dc on 01/22 -- completed 5 day course)  - ID on board, recs noted  - diarrhea resolved     #Breast cancer ER/[place] Neg, HER-2 pos on chemotherapy w/ mets to lung, liver, spleen, spine, bone and brain  - [facility] following, recommending holding all treatment   - c/w pregabalin, methylphenidate (confirmed on ISTOP Reference #: [id number])  - on dilaudid pca pump, continue while admitted, plan to discharge on po meds   - pain management reconsulted, adjusted pain regimen 1/24    #Thrombocytopenia  - likely from chemo/sepsis  - will monitor cbc    #LE edema, improving   - b/l duplex negative  - IV lasix prn  - leg elevation     dvt ppx scd  disposition: need to clarify with ID regarding midline v. PICC line, set up home IV abx, d/c home tomorrow 1/30 39yoF w/ PMHx of metastatic breast cancer ER/[place] Neg, HER-2 + on chemotherapy (last dose 12/26/24) (mets to lung, liver, spleen, spine, bone and brain), gastritis w/ intermittent episodes of dark stool (follows w/ Dr. Rosado GI and is on PPI and octreotide daily). Patient presented 1/5 with c/o worsening SOB. She was seen in ED 1/2/25 for weakness and SOB at which time she was found to have Hb of 5 requiring PRBC transfusion and positive for Flu A and started on tamiflu and was discharged from the ED on 1/3/25.  Her respiratory status has worsened since then w/ productive cough associated with body aches and chills. Admitted for sepsis and acute hypoxic respiratory failure with flu A s/p HFNC for worsening respiratory status and s/p course of Tamiflu. Vancomycin and Zosyn was added for possible superimposed bacterial pna. Blood cultures with MSSA. Cardiology consultation requested for evaluation of endocarditis. Blood cultures 1/5, 1/7, 1/9 reporting MSSA , Repeat blood cultures ngtd on 1/11/25, Sputum Cx 1/6 Staphylococcus aureus. MARYANN hold for active gib requring transfusion. Pt had large BM likley diverticular bleed. Had recent scopy. Gi following. ct abd/pelvis w/iv contrast no active bleed (1/13). Urine Cx 1/5 reporting 10k - 49k Escherichia coli  of ? significance  since UA not concerning for UTI.  RVP/COVID 19 PCR + Influenza A.GI PCR  positive norovirus,Giardia.    #Acute blood loss anemia   #GIB  - s/p multiple transfusions during this hospital stay  - goal hgb of 7   - CTA abd/pelvis no active bleed   - PPI/OCTRIO  - GI eval with no plan for scopes 2/2 recently done, pt has had multiple EGD/colonoscopies in past as well.   - per GI, no plan for any endoscopy, pt will likely be transfusion dependent  - hem/onc started Amicar on 1/16  - hem/onc following, pt responding well to amicar, plan to continue outpt, will assess need for transfusions on outpt basis and will set up as needed pending ongoing response to amicar   - monitor BMs, Hgb    #MSSA bacteremia  - blood clx positive 1/5, repeat bl c/s (1/11) ngtd  - ID following  - Continue nafcillin (would need to be 6 weeks duration if no MARYANN done)  - Cardiology following, MARYANN w/o IE  - asked ID to order mid v. picc, ID to clarify with heme/onc regarding mid v. picc     #Sepsis 2/2 Flu A w/ superimposed multifocal PNA  #Acute hypoxic respiratory failure 2/2 Flu A w/ superimposed multifocal PNA   - on NC, comfortable  - persistently positive RVP for flu A  - s/p tamiflu   - repeat CT 1/8 shows improved pleural effusion but worsening GGOs, unclear if infectious or malignant etiology  - repeat CT 1/9 with similar findings  - CT PE to evaluate consolidation and PE given persistent hypoxia negative for PE and with improving consolidations  - po steroid tapering dose; will change prednisone 20 -> 10mg qd   - c/w abx per ID recs  - sputum clx growing moderate staph aureus   - TTE EF 65-70%, no WMA  - wean O2     #Chronic normocytic anemia 2/2 metastatic breast cancer on chemo and 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   #Thrombocytopenia likely 2/2 sepsis   #Breast cancer ER/[place] Neg, HER-2 pos on chemotherapy w/ mets to lung, liver, spleen, spine, bone and brain  - Baseline Hb ranges 8-9    - s/p prbc transfusion on 1/2, 1/8 and 1/9,1/11,1/13,1/14  - trend CBC, transfuse for hgb <7  - c/w home sub q octreotide and IV protonix  - Monitor CBC and transfuse for Hb<7 and plts<20K in setting of sepsis   - [facility] following  - monitor on tele and     #Nororvirus, Giadia gastroenteritis   - GI PCR  positive norovirus,Giadia  - Albendazole per ID (dc on 01/22 -- completed 5 day course)  - ID on board, recs noted  - diarrhea resolved     #Breast cancer ER/[place] Neg, HER-2 pos on chemotherapy w/ mets to lung, liver, spleen, spine, bone and brain  - [facility] following, recommending holding all treatment   - c/w pregabalin, methylphenidate (confirmed on ISTOP Reference #: [id number])  - on dilaudid pca pump, continue while admitted, plan to discharge on po meds   - pain management reconsulted, adjusted pain regimen 1/24    #Thrombocytopenia  - likely from chemo/sepsis  - will monitor cbc    #LE edema, improving   - b/l duplex negative  - IV lasix prn  - leg elevation     dvt ppx scd  disposition: need to clarify with ID regarding midline v. PICC line, set up home IV abx, d/c home tomorrow 1/30. Patient requesting to walk with PT tomorrow, evaluated on 1/13 39yoF w/ PMHx of metastatic breast cancer ER/[place] Neg, HER-2 + on chemotherapy (last dose 12/26/24) (mets to lung, liver, spleen, spine, bone and brain), gastritis w/ intermittent episodes of dark stool (follows w/ Dr. Rosado GI and is on PPI and octreotide daily). Patient presented 1/5 with c/o worsening SOB. She was seen in ED 1/2/25 for weakness and SOB at which time she was found to have Hb of 5 requiring PRBC transfusion and positive for Flu A and started on tamiflu and was discharged from the ED on 1/3/25.  Her respiratory status has worsened since then w/ productive cough associated with body aches and chills. Admitted for sepsis and acute hypoxic respiratory failure with flu A s/p HFNC for worsening respiratory status and s/p course of Tamiflu. Vancomycin and Zosyn was added for possible superimposed bacterial pna. Blood cultures with MSSA. Cardiology consultation requested for evaluation of endocarditis. Blood cultures 1/5, 1/7, 1/9 reporting MSSA , Repeat blood cultures ngtd on 1/11/25, Sputum Cx 1/6 Staphylococcus aureus. MARYANN hold for active gib requring transfusion. Pt had large BM likley diverticular bleed. Had recent scopy. Gi following. ct abd/pelvis w/iv contrast no active bleed (1/13). Urine Cx 1/5 reporting 10k - 49k Escherichia coli  of ? significance  since UA not concerning for UTI.  RVP/COVID 19 PCR + Influenza A.GI PCR  positive norovirus,Giardia.    #Acute blood loss anemia   #GIB  - s/p multiple transfusions during this hospital stay  - goal hgb of 7   - CTA abd/pelvis no active bleed   - PPI/OCTRIO  - GI eval with no plan for scopes 2/2 recently done, pt has had multiple EGD/colonoscopies in past as well.   - per GI, no plan for any endoscopy, pt will likely be transfusion dependent  - hem/onc started Amicar on 1/16  - hem/onc following, pt responding well to amicar, plan to continue outpt, will assess need for transfusions on outpt basis and will set up as needed pending ongoing response to amicar   - monitor BMs, Hgb    #MSSA bacteremia  - blood clx positive 1/5, repeat bl c/s (1/11) ngtd  - ID following  - Continue nafcillin (would need to be 6 weeks duration if no MARYANN done)  - Cardiology following, MARYANN w/o IE  - asked ID to order mid v. picc, ID to clarify with heme/onc regarding mid v. picc     #Sepsis 2/2 Flu A w/ superimposed multifocal PNA  #Acute hypoxic respiratory failure 2/2 Flu A w/ superimposed multifocal PNA   - on NC, comfortable  - persistently positive RVP for flu A  - s/p tamiflu   - repeat CT 1/8 shows improved pleural effusion but worsening GGOs, unclear if infectious or malignant etiology  - repeat CT 1/9 with similar findings  - CT PE to evaluate consolidation and PE given persistent hypoxia negative for PE and with improving consolidations  - po steroid tapering dose; will change prednisone 20 -> 10mg qd   - c/w abx per ID recs  - sputum clx growing moderate staph aureus   - TTE EF 65-70%, no WMA  - wean O2     #Chronic normocytic anemia 2/2 metastatic breast cancer on chemo and 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   #Thrombocytopenia likely 2/2 sepsis   #Breast cancer [place] Neg, HER-2 pos on chemotherapy w/ mets to lung, liver, spleen, spine, bone and brain  - Baseline Hb ranges 8-9    - s/p prbc transfusion on 1/2, 1/8 and 1/9,1/11,1/13,1/14  - trend CBC, transfuse for hgb <7  - c/w home sub q octreotide and IV protonix  - Monitor CBC and transfuse for Hb<7 and plts<20K in setting of sepsis   - [facility] following  - monitor on tele and     #Nororvirus, Giadia gastroenteritis   - GI PCR  positive norovirus,Giadia  - Albendazole per ID (dc on 01/22 -- completed 5 day course)  - ID on board, recs noted  - diarrhea resolved     #Breast cancer ER/[place] Neg, HER-2 pos on chemotherapy w/ mets to lung, liver, spleen, spine, bone and brain  - [facility] following, recommending holding all treatment   - c/w pregabalin, methylphenidate (confirmed on ISTOP Reference #: [id number])  - on dilaudid pca pump, continue while admitted, plan to discharge on po meds   - pain management reconsulted, adjusted pain regimen 1/24    #Thrombocytopenia  - likely from chemo/sepsis  - will monitor cbc    #LE edema, improving   - b/l duplex negative  - IV lasix prn  - leg elevation     dvt ppx scd  disposition: need to clarify with ID regarding midline v. PICC line, set up home IV abx, d/c home tomorrow 1/29. Patient requesting to walk with PT tomorrow, evaluated on 1/13

## 2025-01-28 NOTE — PROGRESS NOTE ADULT - ASSESSMENT
38 yo F with h/o metastatic breast cancer ER/[place] Neg, HER-2 + on chemotherapy (last dose 12/26/24) (mets to lung, liver, spleen, spine, bone and brain).     Recommendations:      - dPCA 0/0.3/8/8  - minimal use - 4x in past 24hrs   - Dilaudid 0.5mg IVP q3h prn severe BTP ONLY. Hold for sedation or unstable vitals.   - lidocaine   Patch 12 hours on, 12 hours off. Max 3 patches on at one time   - Continue Methadone 5mg po bid at 0600 and 1400   - Continue Methadone 10mg po qHS   - Continue home Lyrica 200mg po q8h        When due for discharge planning:   - DC Dilaudid PCA   - Recommend starting Dilaudid PO 4/8mg q4h PRN mod/severe pain

## 2025-01-28 NOTE — CONSULT NOTE ADULT - PROVIDER SPECIALTY LIST ADULT
Infectious Disease
Gastroenterology
Heme/Onc
MICU
Pain Medicine
Palliative Care
Pulmonology
Pulmonology
Cardiology

## 2025-01-28 NOTE — CONSULT NOTE ADULT - CONSULT REASON
metastatic breast cancer
AHRF
STAR GOC
hematochezia
AHRF
New Bipap
Pain Management
Bacteremia
persistent bacteremia, rule out endocarditis.

## 2025-01-28 NOTE — CONSULT NOTE ADULT - ASSESSMENT
39 year old female PMHx metastatic breast cancer presented for worsening shortness of breath.  Admitted for sepsis ,acute hypoxic respiratory failure with flu A. Was on high flow, bipap, now on nasal cannula. BC + MSSA. GI following for bloody BM/diverticular bleed- no plans for scope (was scoped in 11/2024)  and cardiology, MARYANN neg for vegetations. ID following for MSSA bacteremia, on abx.   Palliative consulted for STAR criteria advance illness    Problem/Recommendation 1:Breast CA  Noted CT chest with bony metastasis  plan to f/u outpatient with oncology     Problem/Recommendation 2: Weakness  Assist in ADLS  Maintain safety, fall, aspiration precautions  Set bed alarm, chair alarm for safety and fall prevention  Turn and Position in bed     Problem/Recommendation 3: Palliative Care Encounter  Palliative consulted for STAR criteria  Met with patient at bedside, introduced Palliative Care Team and Services at [facility].  Patient in and out of sleep cycle  Pain managed on current regimen with pain management  Discussed with Dr. Burnette, no further needs from palliative perspective  Patient is full code, HCP is patient's sister Arianne (confirmed with patient)  Surrogate/HCP/Guardian: Phone#:Arianne Brooks [phone]  GOC have been established, no further needs to be addressed from Palliative care perspective.  Will sign off at this time. Please reconsult if GOC change or condition decompensates requiring further palliative care assistance.    Total Time Spent__58_ minutes  Total time also includes discussion during interdisciplinary team rounds, chart review including but limited to prior admissions/   review of medications/ labs/ imaging, examination, care coordination with other health care professionals, documentation EXCLUDING advance care planning discussions.       COUNSELING:  Face to face meeting to discuss Advanced Care Planning - Time Spent __18___Minutes.      Thank you for the opportunity to assist with the care of this patient.   [facility] Palliative Medicine Consult Service [phone].  39 year old female PMHx metastatic breast cancer presented for worsening shortness of breath.  Admitted for sepsis ,acute hypoxic respiratory failure with flu A. Was on high flow, bipap, now on nasal cannula. BC + MSSA. GI following for bloody BM/diverticular bleed- no plans for scope (was scoped in 11/2024)  and cardiology, MARYANN neg for vegetations. ID following for MSSA bacteremia, on abx.   Palliative consulted for STAR criteria advance illness    Problem/Recommendation 1:Breast CA  Noted CT chest with bony metastasis  plan to f/u outpatient with oncology     Problem/Recommendation 2: Weakness  Assist in ADLS  Maintain safety, fall, aspiration precautions  Set bed alarm, chair alarm for safety and fall prevention  Turn and Position in bed     Problem/Recommendation 3: Palliative Care Encounter  Palliative consulted for STAR criteria  Met with patient at bedside, introduced Palliative Care Team and Services at [facility].  Patient in and out of sleep cycle  Pain managed on current regimen with pain management  Discussed with Dr. Burnette, no further needs from palliative perspective  Patient is full code, HCP is patient's sister Arianne (confirmed with patient)  Surrogate/HCP/Guardian: Phone#:Arianne Brooks [phone]  GOC have been established, no further needs to be addressed from Palliative care perspective.  Will sign off at this time. Please reconsult if GOC change or condition decompensates requiring further palliative care assistance.    Total Time Spent__58_ minutes  Total time also includes discussion during interdisciplinary team rounds, chart review including but limited to prior admissions/   review of medications/ labs/ imaging, examination, care coordination with other health care professionals, documentation EXCLUDING advance care planning discussions.   Advanced Care Planning - Time Spent __10__Minutes.      Thank you for the opportunity to assist with the care of this patient.   [facility] Palliative Medicine Consult Service [phone].

## 2025-01-28 NOTE — PROGRESS NOTE ADULT - SUBJECTIVE AND OBJECTIVE BOX
[facility] Division of Hospital Medicine    INTERVAL HISTORY:  Overnight, no acute events.     Patient seen and examined at bedside this morning. Sleepy this am, reports she did not get any sleep overnight. Re-evaluated later in day, improved mentation. Patient denies chest pain, SOB, abd pain, N/V, fever, chills, dysuria or any other complaints.     MEDICATIONS  (STANDING):  acetylcysteine 10%  Inhalation 4 milliLiter(s) Inhalation every 6 hours  aminocaproic acid Tablet 3.5 Gram(s) Oral every 6 hours  chlorhexidine 2% Cloths 1 Application(s) Topical daily  dextrose 5%. 1000 milliLiter(s) (100 mL/Hr) IV Continuous <Continuous>  dextrose 5%. 1000 milliLiter(s) (50 mL/Hr) IV Continuous <Continuous>  dextrose 50% Injectable 25 Gram(s) IV Push once  dextrose 50% Injectable 25 Gram(s) IV Push once  dextrose 50% Injectable 12.5 Gram(s) IV Push once  FLUoxetine 80 milliGRAM(s) Oral at bedtime  glucagon  Injectable 1 milliGRAM(s) IntraMuscular once  hydrocortisone 1% Cream 1 Application(s) Topical daily  hydrocortisone 2.5% Rectal Cream 1 Application(s) Rectal two times a day  HYDROmorphone PCA (1 mG/mL) 30 milliLiter(s) PCA Continuous PCA Continuous  insulin lispro (ADMELOG) corrective regimen sliding scale   SubCutaneous three times a day before meals  levalbuterol Inhalation 1.25 milliGRAM(s) Inhalation every 6 hours  lidocaine   4% Patch 1 Patch Transdermal daily  memantine 5 milliGRAM(s) Oral at bedtime  memantine 10 milliGRAM(s) Oral with breakfast  methadone    Tablet 10 milliGRAM(s) Oral at bedtime  nafcillin  IVPB 2 Gram(s) IV Intermittent every 4 hours  octreotide  Injectable 100 MICROGram(s) SubCutaneous two times a day  OLANZapine 2.5 milliGRAM(s) Oral at bedtime  pantoprazole  Injectable 40 milliGRAM(s) IV Push every 12 hours  predniSONE   Tablet 10 milliGRAM(s) Oral daily  pregabalin 200 milliGRAM(s) Oral every 8 hours  sodium chloride 0.9% with potassium chloride 20 mEq/L 1000 milliLiter(s) (100 mL/Hr) IV Continuous <Continuous>    MEDICATIONS  (PRN):  acetaminophen     Tablet .. 650 milliGRAM(s) Oral every 6 hours PRN Temp greater or equal to 38C (100.4F), Mild Pain (1 - 3)  aluminum hydroxide/magnesium hydroxide/simethicone Suspension 30 milliLiter(s) Oral every 4 hours PRN Dyspepsia  benzonatate 100 milliGRAM(s) Oral every 8 hours PRN Cough  dextrose Oral Gel 15 Gram(s) Oral once PRN Blood Glucose LESS THAN 70 milliGRAM(s)/deciliter  guaifenesin/dextromethorphan Oral Liquid 10 milliLiter(s) Oral every 4 hours PRN Cough  HYDROmorphone  Injectable 0.5 milliGRAM(s) IV Push every 3 hours PRN Severe Pain (7 - 10)  melatonin 3 milliGRAM(s) Oral at bedtime PRN Insomnia  naloxone Injectable 0.1 milliGRAM(s) IV Push every 3 minutes PRN For ANY of the following changes in patient status:  A. RR LESS THAN 10 breaths per minute, B. Oxygen saturation LESS THAN 90%, C. Sedation score of 6  ondansetron Injectable 4 milliGRAM(s) IV Push every 8 hours PRN Nausea and/or Vomiting  sodium chloride 0.65% Nasal 1 Spray(s) Both Nostrils five times a day PRN Nasal Congestion        I&O's Summary      PHYSICAL EXAM:  Vital Signs Last 24 Hrs  T(C): 36.5 (28 Jan 2025 11:11), Max: 36.7 (28 Jan 2025 00:07)  T(F): 97.7 (28 Jan 2025 11:11), Max: 98 (28 Jan 2025 00:07)  HR: 69 (28 Jan 2025 11:11) (69 - 789)  BP: 97/60 (28 Jan 2025 11:11) (97/60 - 120/80)  BP(mean): --  RR: 18 (28 Jan 2025 11:11) (15 - 18)  SpO2: 98% (28 Jan 2025 11:11) (93% - 98%)    Parameters below as of 27 Jan 2025 20:37  Patient On (Oxygen Delivery Method): nasal cannula w/ humidification, 2L      CONSTITUTIONAL: No apparent distress  HEENT: Normocephalic, Atraumatic.   RESPIRATORY:  lungs are clear to auscultation bilaterally  CARDIOVASCULAR: Regular rate and rhythm, 1+ lower extremity edema  ABDOMEN: Soft, non-distended, nontender to palpation, +BS  PSYCH: thoughts linear, affect appropriate  NEUROLOGY: Alert, Oriented x3    LABS:                        7.3    3.94  )-----------( 134      ( 28 Jan 2025 05:55 )             24.7     01-28    142  |  105  |  7.5[L]  ----------------------------<  152[H]  3.5   |  26.0  |  0.58    Ca    8.1[L]      28 Jan 2025 05:55            Urinalysis Basic - ( 28 Jan 2025 05:55 )    Color: x / Appearance: x / SG: x / pH: x  Gluc: 152 mg/dL / Ketone: x  / Bili: x / Urobili: x   Blood: x / Protein: x / Nitrite: x   Leuk Esterase: x / RBC: x / WBC x   Sq Epi: x / Non Sq Epi: x / Bacteria: x        CAPILLARY BLOOD GLUCOSE      POCT Blood Glucose.: 141 mg/dL (28 Jan 2025 11:42)  POCT Blood Glucose.: 131 mg/dL (28 Jan 2025 08:12)  POCT Blood Glucose.: 132 mg/dL (27 Jan 2025 22:29)  POCT Blood Glucose.: 134 mg/dL (27 Jan 2025 15:50)        RADIOLOGY & ADDITIONAL TESTS:  Results Reviewed

## 2025-01-28 NOTE — PROGRESS NOTE ADULT - SUBJECTIVE AND OBJECTIVE BOX
[facility]  INFECTIOUS DISEASES at [place] / [place] / [place]  =======================================================                              Salazar Toro MD                              Professor Emeritus:  Dr Warner Mcintosh MD            Diplomates American Board of Internal Medicine & Infectious Diseases                                   Tel  [phone] Fax [phone]                                  Hospital Consult line:  [phone]  =======================================================      [place] [id number]    Follow up: MSSA bacteremia    No fevers   Diarrhea improved       Allergies:  pertuzumab (Other (Severe))  Perjeta (Other (Severe))        REVIEW OF SYSTEMS:  CONSTITUTIONAL:  No Fever or chills  HEENT:   No diplopia or blurred vision.  No earache, sore throat or runny nose.  CARDIOVASCULAR:  No Chest Pain  RESPIRATORY:  No cough, shortness of breath  GASTROINTESTINAL:  No nausea, vomiting + diarrhea.  GENITOURINARY:  No dysuria, frequency or urgency. No Blood in urine  MUSCULOSKELETAL:  no joint aches, no muscle pain  SKIN:  No change in skin, hair or nails.  NEUROLOGIC:  No Headaches      Physical Exam:  GEN: NAD  HEENT: normocephalic and atraumatic.    NECK: Supple.   LUNGS: CTA B/L.  HEART: RRR  ABDOMEN: Soft, NT, ND.  +BS.    : No CVA tenderness  EXTREMITIES: Without  edema.  MSK: No joint swelling  NEUROLOGIC: No Focal Deficits   SKIN: No rash      Vitals:  T(F): 98.6 (27 Jan 2025 11:25), Max: 98.8 (26 Jan 2025 20:30)  HR: 96 (27 Jan 2025 12:45)  BP: 122/82 (27 Jan 2025 12:45)  RR: 18 (27 Jan 2025 11:25)  SpO2: 97% (27 Jan 2025 11:25) (96% - 99%)  temp max in last 48H T(F): , Max: 98.8 (01-26-25 @ 20:30)    Current Antibiotics:  nafcillin  IVPB 2 Gram(s) IV Intermittent every 4 hours    Other medications:  acetylcysteine 10%  Inhalation 4 milliLiter(s) Inhalation every 6 hours  aminocaproic acid Tablet 4 Gram(s) Oral every 6 hours  chlorhexidine 2% Cloths 1 Application(s) Topical daily  dextrose 5%. 1000 milliLiter(s) IV Continuous <Continuous>  dextrose 5%. 1000 milliLiter(s) IV Continuous <Continuous>  dextrose 50% Injectable 25 Gram(s) IV Push once  dextrose 50% Injectable 12.5 Gram(s) IV Push once  dextrose 50% Injectable 25 Gram(s) IV Push once  FLUoxetine 80 milliGRAM(s) Oral at bedtime  glucagon  Injectable 1 milliGRAM(s) IntraMuscular once  hydrocortisone 1% Cream 1 Application(s) Topical daily  hydrocortisone 2.5% Rectal Cream 1 Application(s) Rectal two times a day  HYDROmorphone PCA (1 mG/mL) 30 milliLiter(s) PCA Continuous PCA Continuous  insulin lispro (ADMELOG) corrective regimen sliding scale   SubCutaneous three times a day before meals  levalbuterol Inhalation 1.25 milliGRAM(s) Inhalation every 6 hours  lidocaine   4% Patch 1 Patch Transdermal daily  memantine 5 milliGRAM(s) Oral at bedtime  memantine 10 milliGRAM(s) Oral with breakfast  methadone    Tablet 10 milliGRAM(s) Oral at bedtime  methylphenidate 20 milliGRAM(s) Oral <User Schedule>  octreotide  Injectable 100 MICROGram(s) SubCutaneous two times a day  OLANZapine 2.5 milliGRAM(s) Oral at bedtime  pantoprazole  Injectable 40 milliGRAM(s) IV Push every 12 hours  predniSONE   Tablet 10 milliGRAM(s) Oral daily  pregabalin 200 milliGRAM(s) Oral every 8 hours  sodium chloride 0.9% with potassium chloride 20 mEq/L 1000 milliLiter(s) IV Continuous <Continuous>               8.7    6.67  )-----------( 155      ( 27 Jan 2025 06:18 )             29.0     01-27    140  |  104  |  7.6[L]  ----------------------------<  130[H]  3.9   |  25.0  |  0.53    Ca    8.3[L]      27 Jan 2025 06:18      RECENT CULTURES:  01-17 @ 13:02 .Blood BLOOD     No growth at 5 days    01-17 @ 12:58 .Blood BLOOD     No growth at 5 days      WBC Count: 6.67 K/uL (01-27-25 @ 06:18)  WBC Count: 4.73 K/uL (01-26-25 @ 05:56)  WBC Count: 5.15 K/uL (01-25-25 @ 06:10)  WBC Count: 5.25 K/uL (01-24-25 @ 06:15)  WBC Count: 8.37 K/uL (01-23-25 @ 06:54)  WBC Count: 9.45 K/uL (01-22-25 @ 23:42)    Creatinine: 0.53 mg/dL (01-27-25 @ 06:18)  Creatinine: 0.55 mg/dL (01-26-25 @ 05:56)  Creatinine: 0.51 mg/dL (01-25-25 @ 06:10)  Creatinine: 0.46 mg/dL (01-24-25 @ 06:15)  Creatinine: 0.47 mg/dL (01-23-25 @ 06:54)    Procalcitonin: 0.15 ng/mL (01-21-25 @ 06:58)     SARS-CoV-2: NotDetec (01-05-25 @ 10:54)  SARS-CoV-2 Result: NotDetec (01-02-25 @ 19:20)      < from: MARYANN W or WO Ultrasound Enhancing Agent (01.27.25 @ 13:36) >  CONCLUSIONS:      1. Left ventricular systolic function is normal with an ejection fraction visually estimated at 55 to 60 %.   2. Normal right ventricular cavity size and normalright ventricular systolic function.   3. Mild tricuspid regurgitation.   4. Agitated saline injection was negative for intracardiac shunt.   5. No evidence of left atrial appendage thrombus.   6. No echocardiographic evidence of vegetations.    < end of copied text >        < from: TTE Limited W or WO Ultrasound Enhancing Agent (01.06.25 @ 12:55) >  CONCLUSIONS:      1. Technically difficult image quality.   2. Left ventricular systolic function is normal with an ejection fraction visually estimated at 65 to 70 %.   3. Normal right ventricular cavity size, with normal wall thickness, and normal right ventricular systolic function.   4. Compared to the transthoracic echocardiogram performed on 7/21/2024, there have been no significant interval changes.    < end of copied text >

## 2025-01-28 NOTE — CONSULT NOTE ADULT - CONVERSATION DETAILS
Pain managed on current regimen with pain management  Discussed with Dr. Burnette, no further needs from palliative perspective  Patient is full code, HCP is patient's sister Arianne (confirmed with patient)  Plan for d/c home f/u with outpatient providers

## 2025-01-28 NOTE — CONSULT NOTE ADULT - CONSULT REQUESTED DATE/TIME
07-Jan-2025 12:45
09-Jan-2025 09:29
06-Jan-2025 14:03
06-Jan-2025 04:27
28-Jan-2025 14:46
06-Jan-2025 13:13
07-Jan-2025 16:49
06-Jan-2025 14:55
08-Jan-2025 13:07

## 2025-01-28 NOTE — PROGRESS NOTE ADULT - ASSESSMENT
39y  Female with h/o metastatic breast cancer ER/NH Neg, HER-2 + on chemotherapy (last dose 12/26/24) (mets to lung, liver, spleen, spine, bone and brain), gastritis w/ intermittent episodes of dark stool (follows w/ Dr. Rosado GI and is on PPI and octreotide daily). Patient presented 1/5 with c/o worsening SOB.  She was seen in ED 1/2/25 for for weakness and SOB at which time she was found to have Hb of 5 requiring PRBC transfusion and positive for Flu A and started on tamiflu and was discharged from the ED on 1/3/25.  Her respiratory status has worsened since then w/ productive cough associated with body aches and chills.  Denies sick contacts but has had many frequent hospital visits. Patient has been afebrile, no leukocytosis. Was placed on BIPAP for Worsening respiratory status. continued on Tamiflu. Vancomycn and Zosyn was added. Blood cultures with MSSA.     Metastatic breast cancer   - last dose of trastuzumab was on 12/26/24.  - All treatment on hold at the moment.  - Follow up with primary oncologist, Dr. Chapa after discharge  - Cont aggressive pain management.   - Remains on PCA pump    Cytopenias   - secondary to malignancy and acute illness and now GIB  - S/P multiple transfusions  - GI evaluated for large bloody bowel movement  Suspect rectal bleeding is due to diverticular bleed which are self-limiting - If patient becomes hemodynamically unstable, requiring multiple transfusions,   - CT A/P No evidence of active intraluminal extravasation of contrast.   - GI f/u noted.  no plans for scope.  Previously scoped in Nov. 2024. internal hemorrhoids, gastric erosions, no active bleeding site   - c/w home sub q octreotide and IV protonix  - Has been on  Amicar 4 grams Q 6 H for bleeding with good results  will titrate according to HGB and bleeding. goal of minimal effective dose to prevent bleeding.   - Decreased to 3.5 grams Q6H today    - Hgb 8.7 today,   -Transfuse if hgb<7.0.      Respiratory failure   - multifocal pneumonia and flu+  - S/P oseltamivir    - Management as per primary team.  - TTE 1/6 with no veg  - Cardiology following- . S/P MARYANN as above    MSSA Bacteremia  - cultures persistently positive. pt afebrile.   --  ID following - Continue Nafcillin 2gm IV [id number]  -- S/P Port removal 1/10/25-  PICC line prior to d/c for outpt chemo   - She is concerned about access. Will place once closer to discharge.  + Noro virus and giardia lamblia  -- For Giardia, Completed 5 days of Albendazole     Will need to transition to PO meds when pain controlled     Will follow

## 2025-01-28 NOTE — PROGRESS NOTE ADULT - SUBJECTIVE AND OBJECTIVE BOX
39y  Female with h/o metastatic breast cancer ER/MO Neg, HER-2 + on chemotherapy (last dose 12/26/24) (mets to lung, liver, spleen, spine, bone and brain), gastritis w/ intermittent episodes of dark stool (follows w/ Dr. Rosado GI and is on PPI and octreotide daily). Patient presented 1/5 with c/o worsening SOB.  She was seen in ED 1/2/25 for for weakness and SOB at which time she was found to have Hb of 5 requiring PRBC transfusion and positive for Flu A and started on tamiflu and was discharged from the ED on 1/3/25.  Her respiratory status has worsened since then w/ productive cough associated with body aches and chills.  Denies sick contacts but has had many frequent hospital visits. Patient has been afebrile, no leukocytosis. Was placed on BIPAP for Worsening respiratory status. continued on Tamiflu. Vancomycn and Zosyn was added. Blood cultures with MSSA.     PAST MEDICAL & SURGICAL HISTORY:  H/O compression fracture of spine  Anxiety  Metastatic breast cancer  H/O pleural effusion  Pericardial effusion  S/P tonsillectomy  H/O chest tube placement  12/23/21  S/P pericardiocentesis  12/28/21    Allergies  pertuzumab (Other (Severe))  Perjeta (Other (Severe))    MEDICATIONS  (STANDING):  acetylcysteine 10%  Inhalation 4 milliLiter(s) Inhalation every 6 hours  aminocaproic acid Tablet 4 Gram(s) Oral every 6 hours  chlorhexidine 2% Cloths 1 Application(s) Topical daily  dextrose 5%. 1000 milliLiter(s) (50 mL/Hr) IV Continuous <Continuous>  dextrose 5%. 1000 milliLiter(s) (100 mL/Hr) IV Continuous <Continuous>  dextrose 50% Injectable 25 Gram(s) IV Push once  dextrose 50% Injectable 12.5 Gram(s) IV Push once  dextrose 50% Injectable 25 Gram(s) IV Push once  FLUoxetine Solution 80 milliGRAM(s) Oral at bedtime  glucagon  Injectable 1 milliGRAM(s) IntraMuscular once  hydrocortisone 1% Cream 1 Application(s) Topical daily  hydrocortisone 2.5% Rectal Cream 1 Application(s) Rectal two times a day  HYDROmorphone PCA (1 mG/mL) 30 milliLiter(s) PCA Continuous PCA Continuous  insulin lispro (ADMELOG) corrective regimen sliding scale   SubCutaneous three times a day before meals  levalbuterol Inhalation 1.25 milliGRAM(s) Inhalation every 6 hours  memantine 5 milliGRAM(s) Oral at bedtime  memantine 10 milliGRAM(s) Oral with breakfast  methadone    Tablet 10 milliGRAM(s) Oral at bedtime  methadone    Tablet 5 milliGRAM(s) Oral <User Schedule>  methylphenidate 20 milliGRAM(s) Oral <User Schedule>  nafcillin  IVPB 2 Gram(s) IV Intermittent every 4 hours  octreotide  Injectable 100 MICROGram(s) SubCutaneous two times a day  OLANZapine 2.5 milliGRAM(s) Oral at bedtime  pantoprazole  Injectable 40 milliGRAM(s) IV Push every 12 hours  predniSONE   Tablet 20 milliGRAM(s) Oral daily  pregabalin 200 milliGRAM(s) Oral every 8 hours  sodium chloride 0.9% with potassium chloride 20 mEq/L 1000 milliLiter(s) (100 mL/Hr) IV Continuous <Continuous>    MEDICATIONS  (PRN):  acetaminophen     Tablet .. 650 milliGRAM(s) Oral every 6 hours PRN Temp greater or equal to 38C (100.4F), Mild Pain (1 - 3)  aluminum hydroxide/magnesium hydroxide/simethicone Suspension 30 milliLiter(s) Oral every 4 hours PRN Dyspepsia  benzonatate 100 milliGRAM(s) Oral every 8 hours PRN Cough  dextrose Oral Gel 15 Gram(s) Oral once PRN Blood Glucose LESS THAN 70 milliGRAM(s)/deciliter  guaifenesin/dextromethorphan Oral Liquid 10 milliLiter(s) Oral every 4 hours PRN Cough  HYDROmorphone  Injectable 0.5 milliGRAM(s) IV Push every 3 hours PRN Severe Pain (7 - 10)  melatonin 3 milliGRAM(s) Oral at bedtime PRN Insomnia  naloxone Injectable 0.1 milliGRAM(s) IV Push every 3 minutes PRN For ANY of the following changes in patient status:  A. RR LESS THAN 10 breaths per minute, B. Oxygen saturation LESS THAN 90%, C. Sedation score of 6  ondansetron Injectable 4 milliGRAM(s) IV Push every 8 hours PRN Nausea and/or Vomiting    Vital Signs Last 24 Hrs  T(C): 36.6 (28 Jan 2025 04:49), Max: 37 (27 Jan 2025 11:25)  T(F): 97.9 (28 Jan 2025 04:49), Max: 98.6 (27 Jan 2025 11:25)  HR: 88 (28 Jan 2025 04:49) (76 - 789)  BP: 103/63 (28 Jan 2025 04:49) (101/63 - 122/82)  BP(mean): --  RR: 18 (28 Jan 2025 04:49) (15 - 18)  SpO2: 96% (28 Jan 2025 04:49) (93% - 97%)    Parameters below as of 27 Jan 2025 20:37  Patient On (Oxygen Delivery Method): nasal cannula w/ humidification, 2L      PE:  NAD  On O2 NC  bilateral rales  abd soft, nd, nt  a/o x 3      CBC                          7.3    3.94  )-----------( 134      ( 28 Jan 2025 05:55 )             24.7                             8.7    6.67  )-----------( 155      ( 27 Jan 2025 06:18 )             29.0                             7.4    5.25  )-----------( 142      ( 24 Jan 2025 06:15 )             24.6                             8.3    8.37  )-----------( 173      ( 23 Jan 2025 06:54 )             26.6              Chem:       01-28    142  |  105  |  7.5[L]  ----------------------------<  152[H]  3.5   |  26.0  |  0.58    Ca    8.1[L]      28 Jan 2025 05:55        01-27    140  |  104  |  7.6[L]  ----------------------------<  130[H]  3.9   |  25.0  |  0.53    Ca    8.3[L]      27 Jan 2025 06:18        01-21    139  |  105  |  4.5[L]  ----------------------------<  132[H]  3.8   |  24.0  |  0.42[L]    Ca    8.0[L]      21 Jan 2025 06:58    TPro  5.1[L]  /  Alb  2.3[L]  /  TBili  0.5  /  DBili  x   /  AST  19  /  ALT  12  /  AlkPhos  140[H]  01-21 01-20    138  |  103  |  4.9[L]  ----------------------------<  167[H]  3.9   |  25.0  |  0.51    Ca    8.0[L]      20 Jan 2025 07:25    TPro  5.5[L]  /  Alb  2.3[L]  /  TBili  0.6  /  DBili  x   /  AST  19  /  ALT  13  /  AlkPhos  165[H]  01-20 01-17    139  |  104  |  8.7  ----------------------------<  163[H]  2.9[LL]   |  26.0  |  0.70    Ca    7.3[L]      17 Jan 2025 06:45  Phos  4.2     01-17  Mg     1.5     01-17    TPro  5.0[L]  /  Alb  2.1[L]  /  TBili  0.8  /  DBili  0.3  /  AST  17  /  ALT  13  /  AlkPhos  127[H]  01-16        MARYANN performed revealing:   Left Ventricle: Left ventricular systolic function is normal with an ejection fraction visually estimated at 55 to 60%. There are no regional wall motion abnormalities seen. Unable to assess left ventricular diastolic function due to insufficient data.  Right Ventricle: The right ventricular cavity is normal in size and right ventricular systolic function is normal.  Left Atrium: The left atrium is normal in size. There is no evidence of left atrial appendage thrombus.  Right Atrium: The right atrium is normal in size.  Interatrial Septum: Lipomatous interatrial septal hypertrophy present. Agitated saline injection was negative for intracardiac shunt.  Aortic Valve: The aortic valve is tricuspid with normal leaflet excursion. There is no evidence of aortic regurgitation.  Mitral Valve: Structurally normal mitral valve with normal leaflet excursion. There is trace mitral regurgitation. There is normal pulmonary venous flow.  Tricuspid Valve: Structurally normal tricuspid valve with normal leaflet excursion. There is mild tricuspid regurgitation. There is no echocardiographic evidence of pulmonary hypertension. Estimated pulmonary artery systolic pressure is 17 mmHg. IVC was evaluated but not visualized.  Pulmonic Valve: Structurally normal pulmonic valve with normal leaflet excursion. There is no evidence of pulmonic regurgitation.  Pulmonary Artery: The main pulmonary artery is normal in size, origin, and position.  Aorta: The aortic root appears normal in size.  Pericardium: No pericardial effusion seen.

## 2025-01-28 NOTE — PROGRESS NOTE ADULT - ASSESSMENT
39y  Female with h/o metastatic breast cancer ER/LA Neg, HER-2 + on chemotherapy (last dose 12/26/24) (mets to lung, liver, spleen, spine, bone and brain), gastritis w/ intermittent episodes of dark stool (follows w/ Dr. Alonzo BISHOP and is on PPI and octreotide daily). Patient presented 1/5 with c/o worsening SOB.  She was seen in ED 1/2/25 for for weakness and SOB at which time she was found to have Hb of 5 requiring PRBC transfusion and positive for Flu A and started on tamiflu and was discharged from the ED on 1/3/25.  Her respiratory status has worsened since then w/ productive cough associated with body aches and chills.  Denies sick contacts but has had many frequent hospital visits. Patient has been afebrile, no leukocytosis. Was placed on BIPAP for Worsening respiratory status. continued on Tamiflu. Vancomycn and Zosyn was added. Blood cultures with MSSA. ID input requested.       MSSA bacteremia   Staphylococcus aureus PNA   Influenza A   metastatic breast cancer ER/LA Neg, HER-2 + on chemotherapy   Port in place s/p explant 1/10/25  Stool PCR + Norovirus and Giardia       - Blood cultures 1/5, 1/7, 1/9 reporting MSSA    - Repeat blood cultures 1/10,  1/11/25, 1/17/25 no growth   - Sputum Cx 1/6 Staphylococcus aureus (MSSA)  - Urine Cx 1/5 reporting 10k - 49k Escherichia coli  of ? significance since UA not concerning for UTI   - Stool PCR + Norovirus and Giardia   - RVP/COVID 19 PCR + Influenza A  - S/P Port removal 1/10/25  - CTA Chest reporting No acute pulmonary embolism. Wide spread patchy airspace opacities bilaterally, increased since the prior exam.  - US RUQ reporting No evidence of acute cholecystitis or biliary ductal dilatation.  - Procalcitonin level 2.32 --> 1.37, ordered for the AM   - TTE 1/6 with no veg  - Called cardiology consult for MARYANN, done 1/27, no veg, d/w Dr Chandan corona noted, no plan for EGD   - s/p Port removal 1/10/25  - Patient still with dropping H/H, receiving multiple transfusion weekly  - Since no veg on MARYANN and port removed, will plan on antibiotics till 2/6/25 (4 weeks)  - Completed oseltamivir  1/10/25  - Continue Nafcillin 2gm IV [id number]  - For Norovirus, continue hydration and supportive care  - For Giardia, Completed 5 days of Albendazole   - Hold off on PICC/Midline for now unless needed for reasons other than infectious diseases  - Follow up cultures  - Trend Fever  - Trend WBC      Thank you for allowing me to participate in the care of your patient.   Will Follow    Discussed treatment plan with:  Clinical pharmacy, Dr Hawley

## 2025-01-29 ENCOUNTER — TRANSCRIPTION ENCOUNTER (OUTPATIENT)
Age: 40
End: 2025-01-29

## 2025-01-29 LAB
ANION GAP SERPL CALC-SCNC: 9 MMOL/L — SIGNIFICANT CHANGE UP (ref 5–17)
BUN SERPL-MCNC: 7 MG/DL — LOW (ref 8–20)
CALCIUM SERPL-MCNC: 8 MG/DL — LOW (ref 8.4–10.5)
CHLORIDE SERPL-SCNC: 103 MMOL/L — SIGNIFICANT CHANGE UP (ref 96–108)
CO2 SERPL-SCNC: 26 MMOL/L — SIGNIFICANT CHANGE UP (ref 22–29)
CREAT SERPL-MCNC: 0.55 MG/DL — SIGNIFICANT CHANGE UP (ref 0.5–1.3)
EGFR: 120 ML/MIN/1.73M2 — SIGNIFICANT CHANGE UP
GLUCOSE BLDC GLUCOMTR-MCNC: 100 MG/DL — HIGH (ref 70–99)
GLUCOSE BLDC GLUCOMTR-MCNC: 114 MG/DL — HIGH (ref 70–99)
GLUCOSE BLDC GLUCOMTR-MCNC: 181 MG/DL — HIGH (ref 70–99)
GLUCOSE BLDC GLUCOMTR-MCNC: 184 MG/DL — HIGH (ref 70–99)
GLUCOSE SERPL-MCNC: 107 MG/DL — HIGH (ref 70–99)
HCT VFR BLD CALC: 25 % — LOW (ref 34.5–45)
HGB BLD-MCNC: 7.5 G/DL — LOW (ref 11.5–15.5)
MCHC RBC-ENTMCNC: 30 G/DL — LOW (ref 32–36)
MCHC RBC-ENTMCNC: 30 PG — SIGNIFICANT CHANGE UP (ref 27–34)
MCV RBC AUTO: 100 FL — SIGNIFICANT CHANGE UP (ref 80–100)
PLATELET # BLD AUTO: 147 K/UL — LOW (ref 150–400)
POTASSIUM SERPL-MCNC: 3 MMOL/L — LOW (ref 3.5–5.3)
POTASSIUM SERPL-SCNC: 3 MMOL/L — LOW (ref 3.5–5.3)
RBC # BLD: 2.5 M/UL — LOW (ref 3.8–5.2)
RBC # FLD: 25.4 % — HIGH (ref 10.3–14.5)
SODIUM SERPL-SCNC: 138 MMOL/L — SIGNIFICANT CHANGE UP (ref 135–145)
WBC # BLD: 4.86 K/UL — SIGNIFICANT CHANGE UP (ref 3.8–10.5)
WBC # FLD AUTO: 4.86 K/UL — SIGNIFICANT CHANGE UP (ref 3.8–10.5)

## 2025-01-29 PROCEDURE — 36573 INSJ PICC RS&I 5 YR+: CPT

## 2025-01-29 PROCEDURE — 99232 SBSQ HOSP IP/OBS MODERATE 35: CPT

## 2025-01-29 PROCEDURE — G0545: CPT

## 2025-01-29 PROCEDURE — 71045 X-RAY EXAM CHEST 1 VIEW: CPT | Mod: 26

## 2025-01-29 PROCEDURE — 71045 X-RAY EXAM CHEST 1 VIEW: CPT | Mod: 26,77

## 2025-01-29 PROCEDURE — 76937 US GUIDE VASCULAR ACCESS: CPT | Mod: 26,59

## 2025-01-29 PROCEDURE — 76942 ECHO GUIDE FOR BIOPSY: CPT | Mod: 26,59

## 2025-01-29 PROCEDURE — 99233 SBSQ HOSP IP/OBS HIGH 50: CPT

## 2025-01-29 RX ORDER — ANTISEPTIC SURGICAL SCRUB 0.04 MG/ML
1 SOLUTION TOPICAL
Refills: 0 | Status: DISCONTINUED | OUTPATIENT
Start: 2025-01-29 | End: 2025-01-31

## 2025-01-29 RX ORDER — HYDROMORPHONE HYDROCHLORIDE 4 MG/ML
4 INJECTION, SOLUTION INTRAMUSCULAR; INTRAVENOUS; SUBCUTANEOUS EVERY 4 HOURS
Refills: 0 | Status: DISCONTINUED | OUTPATIENT
Start: 2025-01-29 | End: 2025-01-29

## 2025-01-29 RX ORDER — POTASSIUM CHLORIDE 750 MG/1
40 TABLET, EXTENDED RELEASE ORAL EVERY 4 HOURS
Refills: 0 | Status: COMPLETED | OUTPATIENT
Start: 2025-01-29 | End: 2025-01-29

## 2025-01-29 RX ORDER — HYDROMORPHONE HYDROCHLORIDE 4 MG/ML
4 INJECTION, SOLUTION INTRAMUSCULAR; INTRAVENOUS; SUBCUTANEOUS EVERY 4 HOURS
Refills: 0 | Status: DISCONTINUED | OUTPATIENT
Start: 2025-01-29 | End: 2025-01-31

## 2025-01-29 RX ORDER — BACTERIOSTATIC SODIUM CHLORIDE 0.9 %
10 VIAL (ML) INJECTION
Refills: 0 | Status: DISCONTINUED | OUTPATIENT
Start: 2025-01-29 | End: 2025-01-31

## 2025-01-29 RX ORDER — HYDROMORPHONE HYDROCHLORIDE 4 MG/ML
8 INJECTION, SOLUTION INTRAMUSCULAR; INTRAVENOUS; SUBCUTANEOUS EVERY 4 HOURS
Refills: 0 | Status: DISCONTINUED | OUTPATIENT
Start: 2025-01-29 | End: 2025-01-31

## 2025-01-29 RX ORDER — DIPHENHYDRAMINE HCL 25 MG
25 CAPSULE ORAL ONCE
Refills: 0 | Status: COMPLETED | OUTPATIENT
Start: 2025-01-29 | End: 2025-01-29

## 2025-01-29 RX ORDER — HYDROMORPHONE HYDROCHLORIDE 4 MG/ML
2 INJECTION, SOLUTION INTRAMUSCULAR; INTRAVENOUS; SUBCUTANEOUS EVERY 4 HOURS
Refills: 0 | Status: DISCONTINUED | OUTPATIENT
Start: 2025-01-29 | End: 2025-01-29

## 2025-01-29 RX ORDER — HYDROMORPHONE HYDROCHLORIDE 4 MG/ML
0.5 INJECTION, SOLUTION INTRAMUSCULAR; INTRAVENOUS; SUBCUTANEOUS
Refills: 0 | Status: DISCONTINUED | OUTPATIENT
Start: 2025-01-29 | End: 2025-01-31

## 2025-01-29 RX ADMIN — NAFCILLIN INJECTION 200 GRAM(S): 2 POWDER, FOR SOLUTION INTRAMUSCULAR; INTRAMUSCULAR; INTRAVENOUS at 05:24

## 2025-01-29 RX ADMIN — OCTREOTIDE ACETATE 100 MICROGRAM(S): 1000 INJECTION INTRAVENOUS; SUBCUTANEOUS at 05:24

## 2025-01-29 RX ADMIN — AMINOCAPROIC ACID 3.5 GRAM(S): 1000 TABLET ORAL at 13:22

## 2025-01-29 RX ADMIN — MEMANTINE HYDROCHLORIDE 10 MILLIGRAM(S): 7 CAPSULE, EXTENDED RELEASE ORAL at 09:15

## 2025-01-29 RX ADMIN — HYDROMORPHONE HYDROCHLORIDE 8 MILLIGRAM(S): 4 INJECTION, SOLUTION INTRAMUSCULAR; INTRAVENOUS; SUBCUTANEOUS at 14:08

## 2025-01-29 RX ADMIN — Medication 1 APPLICATION(S): at 13:22

## 2025-01-29 RX ADMIN — NAFCILLIN INJECTION 200 GRAM(S): 2 POWDER, FOR SOLUTION INTRAMUSCULAR; INTRAMUSCULAR; INTRAVENOUS at 18:46

## 2025-01-29 RX ADMIN — NAFCILLIN INJECTION 200 GRAM(S): 2 POWDER, FOR SOLUTION INTRAMUSCULAR; INTRAMUSCULAR; INTRAVENOUS at 11:12

## 2025-01-29 RX ADMIN — NAFCILLIN INJECTION 200 GRAM(S): 2 POWDER, FOR SOLUTION INTRAMUSCULAR; INTRAMUSCULAR; INTRAVENOUS at 21:49

## 2025-01-29 RX ADMIN — Medication 4 MILLILITER(S): at 10:06

## 2025-01-29 RX ADMIN — LIDOCAINE HYDROCHLORIDE 1 PATCH: 30 CREAM TOPICAL at 19:03

## 2025-01-29 RX ADMIN — PREGABALIN CAPSULES, CV 200 MILLIGRAM(S): 225 CAPSULE ORAL at 05:23

## 2025-01-29 RX ADMIN — OCTREOTIDE ACETATE 100 MICROGRAM(S): 1000 INJECTION INTRAVENOUS; SUBCUTANEOUS at 17:37

## 2025-01-29 RX ADMIN — PREGABALIN CAPSULES, CV 200 MILLIGRAM(S): 225 CAPSULE ORAL at 13:20

## 2025-01-29 RX ADMIN — LIDOCAINE HYDROCHLORIDE 1 PATCH: 30 CREAM TOPICAL at 11:14

## 2025-01-29 RX ADMIN — ACETAMINOPHEN, DIPHENHYDRAMINE HCL, PHENYLEPHRINE HCL 3 MILLIGRAM(S): 325; 25; 5 TABLET ORAL at 21:49

## 2025-01-29 RX ADMIN — Medication 1: at 13:20

## 2025-01-29 RX ADMIN — POTASSIUM CHLORIDE 40 MILLIEQUIVALENT(S): 750 TABLET, EXTENDED RELEASE ORAL at 13:21

## 2025-01-29 RX ADMIN — Medication 25 MILLIGRAM(S): at 20:22

## 2025-01-29 RX ADMIN — AMINOCAPROIC ACID 3.5 GRAM(S): 1000 TABLET ORAL at 17:37

## 2025-01-29 RX ADMIN — AMINOCAPROIC ACID 3.5 GRAM(S): 1000 TABLET ORAL at 05:24

## 2025-01-29 RX ADMIN — HYDROMORPHONE HYDROCHLORIDE 0.5 MILLIGRAM(S): 4 INJECTION, SOLUTION INTRAMUSCULAR; INTRAVENOUS; SUBCUTANEOUS at 16:08

## 2025-01-29 RX ADMIN — HYDROMORPHONE HYDROCHLORIDE 8 MILLIGRAM(S): 4 INJECTION, SOLUTION INTRAMUSCULAR; INTRAVENOUS; SUBCUTANEOUS at 21:49

## 2025-01-29 RX ADMIN — HYDROMORPHONE HYDROCHLORIDE 0.5 MILLIGRAM(S): 4 INJECTION, SOLUTION INTRAMUSCULAR; INTRAVENOUS; SUBCUTANEOUS at 23:45

## 2025-01-29 RX ADMIN — HYDROMORPHONE HYDROCHLORIDE 8 MILLIGRAM(S): 4 INJECTION, SOLUTION INTRAMUSCULAR; INTRAVENOUS; SUBCUTANEOUS at 17:37

## 2025-01-29 RX ADMIN — PANTOPRAZOLE 40 MILLIGRAM(S): 20 TABLET, DELAYED RELEASE ORAL at 05:24

## 2025-01-29 RX ADMIN — Medication 4 MILLILITER(S): at 16:27

## 2025-01-29 RX ADMIN — Medication 1.25 MILLIGRAM(S): at 10:06

## 2025-01-29 RX ADMIN — AMINOCAPROIC ACID 3.5 GRAM(S): 1000 TABLET ORAL at 22:59

## 2025-01-29 RX ADMIN — HYDROMORPHONE HYDROCHLORIDE 30 MILLILITER(S): 4 INJECTION, SOLUTION INTRAMUSCULAR; INTRAVENOUS; SUBCUTANEOUS at 07:24

## 2025-01-29 RX ADMIN — ANTISEPTIC SURGICAL SCRUB 1 APPLICATION(S): 0.04 SOLUTION TOPICAL at 13:21

## 2025-01-29 RX ADMIN — HYDROMORPHONE HYDROCHLORIDE 30 MILLILITER(S): 4 INJECTION, SOLUTION INTRAMUSCULAR; INTRAVENOUS; SUBCUTANEOUS at 00:19

## 2025-01-29 RX ADMIN — Medication 80 MILLIGRAM(S): at 21:49

## 2025-01-29 RX ADMIN — PANTOPRAZOLE 40 MILLIGRAM(S): 20 TABLET, DELAYED RELEASE ORAL at 17:37

## 2025-01-29 RX ADMIN — POTASSIUM CHLORIDE 40 MILLIEQUIVALENT(S): 750 TABLET, EXTENDED RELEASE ORAL at 11:13

## 2025-01-29 RX ADMIN — MEMANTINE HYDROCHLORIDE 5 MILLIGRAM(S): 7 CAPSULE, EXTENDED RELEASE ORAL at 21:48

## 2025-01-29 RX ADMIN — LIDOCAINE HYDROCHLORIDE 1 PATCH: 30 CREAM TOPICAL at 23:00

## 2025-01-29 RX ADMIN — HYDROMORPHONE HYDROCHLORIDE 0.5 MILLIGRAM(S): 4 INJECTION, SOLUTION INTRAMUSCULAR; INTRAVENOUS; SUBCUTANEOUS at 05:52

## 2025-01-29 RX ADMIN — HYDROMORPHONE HYDROCHLORIDE 0.5 MILLIGRAM(S): 4 INJECTION, SOLUTION INTRAMUSCULAR; INTRAVENOUS; SUBCUTANEOUS at 23:00

## 2025-01-29 RX ADMIN — NAFCILLIN INJECTION 200 GRAM(S): 2 POWDER, FOR SOLUTION INTRAMUSCULAR; INTRAMUSCULAR; INTRAVENOUS at 15:22

## 2025-01-29 RX ADMIN — HYDROMORPHONE HYDROCHLORIDE 0.5 MILLIGRAM(S): 4 INJECTION, SOLUTION INTRAMUSCULAR; INTRAVENOUS; SUBCUTANEOUS at 18:46

## 2025-01-29 RX ADMIN — OLANZAPINE 2.5 MILLIGRAM(S): 10 TABLET, FILM COATED ORAL at 21:48

## 2025-01-29 RX ADMIN — Medication 1.25 MILLIGRAM(S): at 16:28

## 2025-01-29 RX ADMIN — HYDROMORPHONE HYDROCHLORIDE 0.5 MILLIGRAM(S): 4 INJECTION, SOLUTION INTRAMUSCULAR; INTRAVENOUS; SUBCUTANEOUS at 15:23

## 2025-01-29 RX ADMIN — Medication 100 MILLIGRAM(S): at 21:48

## 2025-01-29 RX ADMIN — METHADONE HYDROCHLORIDE 10 MILLIGRAM(S): 5 SOLUTION ORAL at 21:49

## 2025-01-29 RX ADMIN — NAFCILLIN INJECTION 200 GRAM(S): 2 POWDER, FOR SOLUTION INTRAMUSCULAR; INTRAMUSCULAR; INTRAVENOUS at 02:48

## 2025-01-29 RX ADMIN — PREGABALIN CAPSULES, CV 200 MILLIGRAM(S): 225 CAPSULE ORAL at 21:48

## 2025-01-29 RX ADMIN — Medication 4 MILLILITER(S): at 20:06

## 2025-01-29 RX ADMIN — PREDNISONE 10 MILLIGRAM(S): 5 TABLET ORAL at 05:23

## 2025-01-29 RX ADMIN — Medication 1.25 MILLIGRAM(S): at 20:06

## 2025-01-29 RX ADMIN — HYDROMORPHONE HYDROCHLORIDE 8 MILLIGRAM(S): 4 INJECTION, SOLUTION INTRAMUSCULAR; INTRAVENOUS; SUBCUTANEOUS at 13:21

## 2025-01-29 RX ADMIN — DEXTROMETHORPHAN HBR AND GUAIFENESIN ORAL SOLUTION 10 MILLILITER(S): 10; 100 LIQUID ORAL at 21:49

## 2025-01-29 RX ADMIN — HYDROMORPHONE HYDROCHLORIDE 8 MILLIGRAM(S): 4 INJECTION, SOLUTION INTRAMUSCULAR; INTRAVENOUS; SUBCUTANEOUS at 22:45

## 2025-01-29 NOTE — DISCHARGE NOTE PROVIDER - ATTENDING DISCHARGE PHYSICAL EXAMINATION:
CONSTITUTIONAL: No apparent distress  HEENT: Normocephalic, Atraumatic.   RESPIRATORY:  lungs are clear to auscultation bilaterally  CARDIOVASCULAR: Regular rate and rhythm  EXT: L PICC line in place w/ clean and dry dressing; 1+ lower extremity edema  ABDOMEN: Soft, non-distended, nontender to palpation, +BS  PSYCH: thoughts linear, affect appropriate  NEUROLOGY: Alert, Oriented x3

## 2025-01-29 NOTE — PROGRESS NOTE ADULT - SUBJECTIVE AND OBJECTIVE BOX
Interval Hx:  Patient seen during rounds  Patient reports pain to be controlled on current medications  Patient denies sedation with medications     Analgesic Dosing for past 24 hours reviewed as below:    FLUoxetine   80 milliGRAM(s) Oral (01-28-25 @ 21:09)    HYDROmorphone  Injectable   0.5 milliGRAM(s) IV Push (01-29-25 @ 05:52)    HYDROmorphone  Injectable   0.5 milliGRAM(s) IV Push (01-28-25 @ 21:08)   0.5 milliGRAM(s) IV Push (01-28-25 @ 17:53)   0.5 milliGRAM(s) IV Push (01-28-25 @ 15:19)    memantine   5 milliGRAM(s) Oral (01-28-25 @ 21:09)    memantine   10 milliGRAM(s) Oral (01-29-25 @ 09:15)    methadone    Tablet   10 milliGRAM(s) Oral (01-28-25 @ 21:09)    OLANZapine   2.5 milliGRAM(s) Oral (01-28-25 @ 21:09)    pregabalin   200 milliGRAM(s) Oral (01-29-25 @ 05:23)   200 milliGRAM(s) Oral (01-28-25 @ 21:09)   200 milliGRAM(s) Oral (01-28-25 @ 15:19)          T(C): 36.8 (01-29-25 @ 11:00), Max: 36.9 (01-29-25 @ 00:20)  HR: 87 (01-29-25 @ 11:00) (76 - 87)  BP: 93/53 (01-29-25 @ 11:00) (93/53 - 115/74)  RR: 18 (01-29-25 @ 11:00) (18 - 19)  SpO2: 92% (01-29-25 @ 11:00) (92% - 98%)      01-28-25 @ 07:01  -  01-29-25 @ 07:00  --------------------------------------------------------  IN: 300 mL / OUT: 650 mL / NET: -350 mL        acetaminophen     Tablet .. 650 milliGRAM(s) Oral every 6 hours PRN  acetylcysteine 10%  Inhalation 4 milliLiter(s) Inhalation every 6 hours  aluminum hydroxide/magnesium hydroxide/simethicone Suspension 30 milliLiter(s) Oral every 4 hours PRN  aminocaproic acid Tablet 3.5 Gram(s) Oral every 6 hours  benzonatate 100 milliGRAM(s) Oral every 8 hours PRN  chlorhexidine 2% Cloths 1 Application(s) Topical daily  chlorhexidine 2% Cloths 1 Application(s) Topical <User Schedule>  dextrose 5%. 1000 milliLiter(s) IV Continuous <Continuous>  dextrose 5%. 1000 milliLiter(s) IV Continuous <Continuous>  dextrose 50% Injectable 25 Gram(s) IV Push once  dextrose 50% Injectable 25 Gram(s) IV Push once  dextrose 50% Injectable 12.5 Gram(s) IV Push once  dextrose Oral Gel 15 Gram(s) Oral once PRN  FLUoxetine 80 milliGRAM(s) Oral at bedtime  glucagon  Injectable 1 milliGRAM(s) IntraMuscular once  guaifenesin/dextromethorphan Oral Liquid 10 milliLiter(s) Oral every 4 hours PRN  hydrocortisone 1% Cream 1 Application(s) Topical daily  hydrocortisone 2.5% Rectal Cream 1 Application(s) Rectal two times a day  HYDROmorphone   Tablet 4 milliGRAM(s) Oral every 4 hours PRN  HYDROmorphone   Tablet 8 milliGRAM(s) Oral every 4 hours PRN  HYDROmorphone  Injectable 0.5 milliGRAM(s) IV Push every 3 hours PRN  HYDROmorphone PCA (1 mG/mL) 30 milliLiter(s) PCA Continuous PCA Continuous  insulin lispro (ADMELOG) corrective regimen sliding scale   SubCutaneous three times a day before meals  levalbuterol Inhalation 1.25 milliGRAM(s) Inhalation every 6 hours  lidocaine   4% Patch 1 Patch Transdermal daily  melatonin 3 milliGRAM(s) Oral at bedtime PRN  memantine 5 milliGRAM(s) Oral at bedtime  memantine 10 milliGRAM(s) Oral with breakfast  methadone    Tablet 10 milliGRAM(s) Oral at bedtime  nafcillin  IVPB 2 Gram(s) IV Intermittent every 4 hours  naloxone Injectable 0.1 milliGRAM(s) IV Push every 3 minutes PRN  octreotide  Injectable 100 MICROGram(s) SubCutaneous two times a day  OLANZapine 2.5 milliGRAM(s) Oral at bedtime  ondansetron Injectable 4 milliGRAM(s) IV Push every 8 hours PRN  pantoprazole  Injectable 40 milliGRAM(s) IV Push every 12 hours  potassium chloride    Tablet ER 40 milliEquivalent(s) Oral every 4 hours  predniSONE   Tablet 10 milliGRAM(s) Oral daily  pregabalin 200 milliGRAM(s) Oral every 8 hours  sodium chloride 0.65% Nasal 1 Spray(s) Both Nostrils five times a day PRN  sodium chloride 0.9% lock flush 10 milliLiter(s) IV Push every 1 hour PRN                          7.5    4.86  )-----------( 147      ( 29 Jan 2025 06:15 )             25.0     01-29    138  |  103  |  7.0[L]  ----------------------------<  107[H]  3.0[L]   |  26.0  |  0.55    Ca    8.0[L]      29 Jan 2025 06:15        Urinalysis Basic - ( 29 Jan 2025 06:15 )    Color: x / Appearance: x / SG: x / pH: x  Gluc: 107 mg/dL / Ketone: x  / Bili: x / Urobili: x   Blood: x / Protein: x / Nitrite: x   Leuk Esterase: x / RBC: x / WBC x   Sq Epi: x / Non Sq Epi: x / Bacteria: x        Pain Service   [phone]

## 2025-01-29 NOTE — PROGRESS NOTE ADULT - ASSESSMENT
40 yo F with h/o metastatic breast cancer ER/[place] Neg, HER-2 + on chemotherapy (last dose 12/26/24) (mets to lung, liver, spleen, spine, bone and brain).     Recommendations:      - Dilaudid 0.5mg IVP q3h prn severe BTP ONLY. Hold for sedation or unstable vitals.   - lidocaine   Patch 12 hours on, 12 hours off. Max 3 patches on at one time   - Continue Methadone 5mg po bid at 0600 and 1400   - Continue Methadone 10mg po qHS   - Continue home Lyrica 200mg po q8h        When due for discharge planning:   - DC Dilaudid PCA   - Recommend starting Dilaudid PO 4/8mg q4h PRN mod/severe pain

## 2025-01-29 NOTE — DISCHARGE NOTE PROVIDER - HOSPITAL COURSE
39yoF w/ PMHx of metastatic breast cancer ER/[place] Neg, HER-2 + on chemotherapy (last dose 12/26/24) (mets to lung, liver, spleen, spine, bone and brain), gastritis w/ intermittent episodes of dark stool (follows w/ Dr. Rosado GI and is on PPI and octreotide daily). Patient presented 1/5 with c/o worsening SOB. She was seen in ED 1/2/25 for weakness and SOB at which time she was found to have Hb of 5 requiring PRBC transfusion and positive for Flu A and started on tamiflu and was discharged from the ED on 1/3/25.  Her respiratory status has worsened since then w/ productive cough associated with body aches and chills. Admitted for sepsis and acute hypoxic respiratory failure with flu A s/p HFNC for worsening respiratory status and s/p course of Tamiflu. Vancomycin and Zosyn was added for possible superimposed bacterial pna. Blood cultures with MSSA. Cardiology consultation requested for evaluation of endocarditis. Blood cultures 1/5, 1/7, 1/9 reporting MSSA , Repeat blood cultures ngtd on 1/11/25, Sputum Cx 1/6 Staphylococcus aureus. MARYANN hold for active gib requring transfusion. Pt had large BM likley diverticular bleed. Had recent scopy. Gi following. ct abd/pelvis w/iv contrast no active bleed (1/13). Urine Cx 1/5 reporting 10k - 49k Escherichia coli  of ? significance  since UA not concerning for UTI.  RVP/COVID 19 PCR + Influenza A.GI PCR  positive norovirus,Giardia. clinically improving on nafcillin. Deemed fit for DC home w/ IV Abx.    #Sepsis 2/2 MSSA Bacteremia  #Acute Blood Loss Anemia 2/2 GIB 39yoF w/ PMHx of metastatic breast cancer ER/[place] Neg, HER-2 + on chemotherapy (last dose 12/26/24) (mets to lung, liver, spleen, spine, bone and brain), gastritis w/ intermittent episodes of dark stool (follows w/ Dr. Rosado GI and is on PPI and octreotide daily). Patient presented 1/5 with c/o worsening SOB. She was seen in ED 1/2/25 for weakness and SOB at which time she was found to have Hb of 5 requiring PRBC transfusion and positive for Flu A and started on tamiflu and was discharged from the ED on 1/3/25.  Her respiratory status has worsened since then w/ productive cough associated with body aches and chills. Admitted for sepsis and acute hypoxic respiratory failure with flu A s/p HFNC for worsening respiratory status and s/p course of Tamiflu. Vancomycin and Zosyn was added for possible superimposed bacterial pna. Blood cultures with MSSA. Cardiology consultation requested for evaluation of endocarditis. Blood cultures 1/5, 1/7, 1/9 reporting MSSA, Repeat blood cultures ngtd on 1/11/25, Sputum Cx 1/6 Staphylococcus aureus. Pt had large BM likley diverticular bleed. Had recent scopy. Gi following, no plan for repeat endoscopy/colonoscopy at this time. Started on amicar by heme/onc. MARYANN done which did not show evidence of IE. CT abd/pelvis w/ IV contrast no active bleed (1/13). Urine Cx 1/5 reporting 10k - 49k Escherichia coli, U/A not concerning for UTI. RVP/COVID 19 PCR + Influenza A. GI PCR  positive norovirus, Giardia. Clinically improving on nafcillin. Patient medically stable discharge home with IV abx.

## 2025-01-29 NOTE — PROGRESS NOTE ADULT - ASSESSMENT
39y  Female with h/o metastatic breast cancer ER/[place] Neg, HER-2 + on chemotherapy (last dose 12/26/24) (mets to lung, liver, spleen, spine, bone and brain), gastritis w/ intermittent episodes of dark stool (follows w/ Dr. Alonzo BISHOP and is on PPI and octreotide daily). Patient presented 1/5 with c/o worsening SOB.  She was seen in ED 1/2/25 for for weakness and SOB at which time she was found to have Hb of 5 requiring PRBC transfusion and positive for Flu A and started on tamiflu and was discharged from the ED on 1/3/25.  Her respiratory status has worsened since then w/ productive cough associated with body aches and chills.  Denies sick contacts but has had many frequent hospital visits. Patient has been afebrile, no leukocytosis. Was placed on BIPAP for Worsening respiratory status. continued on Tamiflu. Vancomycn and Zosyn was added. Blood cultures with MSSA. ID input requested.       MSSA bacteremia   Staphylococcus aureus PNA   Influenza A   metastatic breast cancer ER/SD Neg, HER-2 + on chemotherapy   Port in place s/p explant 1/10/25  Stool PCR + Norovirus and Giardia       - Blood cultures 1/5, 1/7, 1/9 reporting MSSA    - Repeat blood cultures 1/10,  1/11/25, 1/17/25 no growth   - Sputum Cx 1/6 Staphylococcus aureus (MSSA)  - Urine Cx 1/5 reporting 10k - 49k Escherichia coli  of ? significance since UA not concerning for UTI   - Stool PCR + Norovirus and Giardia   - RVP/COVID 19 PCR + Influenza A  - S/P Port removal 1/10/25  - CTA Chest reporting No acute pulmonary embolism. Wide spread patchy airspace opacities bilaterally, increased since the prior exam.  - US RUQ reporting No evidence of acute cholecystitis or biliary ductal dilatation.  - Procalcitonin level 2.32 --> 1.37, ordered for the AM   - TTE 1/6 with no veg  - Called cardiology consult for MARYANN, done 1/27, no veg, d/w Dr Chandan corona noted, no plan for EGD   - s/p Port removal 1/10/25  - Patient still with dropping H/H, receiving multiple transfusion weekly  - Since no veg on MARYANN and port removed, will plan on antibiotics till 2/6/25 (4 weeks)  - Completed oseltamivir  1/10/25  - Continue Nafcillin 2gm IV [id number]  - For Norovirus, continue hydration and supportive care  - For Giardia, Completed 5 days of Albendazole   - Will need Nafcillin till 2/7/25  - Appointment with us in 7 to 10 days  - Discussed PICC line procedure with the patient and IV antibiotic administration   - Will plan for PICC  - Follow up cultures  - Trend Fever  - Trend WBC      Thank you for allowing me to participate in the care of your patient.   Will Follow    Discussed treatment plan with:  Clinical pharmacy, Dr Hawley and Oncology Dr Chapa

## 2025-01-29 NOTE — PROCEDURE NOTE - NSPROCDETAILS_GEN_ALL_CORE
dressing applied/flushes easily/secured in place
location identified, draped/prepped, sterile technique used/sterile dressing applied/sterile technique, catheter placed/ultrasound guidance
location identified, draped/prepped, sterile technique used/sterile dressing applied/sterile technique, catheter placed/supine position/ultrasound guidance
location identified, draped/prepped, sterile technique used/blood seen on insertion/dressing applied/flushes easily/secured in place/sterile technique, catheter placed
location identified, draped/prepped, sterile technique used/sterile dressing applied/sterile technique, catheter placed/supine position/ultrasound guidance

## 2025-01-29 NOTE — PROGRESS NOTE ADULT - SUBJECTIVE AND OBJECTIVE BOX
Hospitalist Progress Note    Chief Complaint: Sepsis     SUBJECTIVE / OVERNIGHT EVENTS:  No events overnight, patient seen at bedside, in NAD, no new complaints today. Patient denies chest pain, SOB, abd pain, N/V, fever, chills, dysuria or any other complaints. All remainder ROS negative.     MEDICATIONS  (STANDING):  acetylcysteine 10%  Inhalation 4 milliLiter(s) Inhalation every 6 hours  aminocaproic acid Tablet 3.5 Gram(s) Oral every 6 hours  chlorhexidine 2% Cloths 1 Application(s) Topical daily  dextrose 5%. 1000 milliLiter(s) (100 mL/Hr) IV Continuous <Continuous>  dextrose 5%. 1000 milliLiter(s) (50 mL/Hr) IV Continuous <Continuous>  dextrose 50% Injectable 25 Gram(s) IV Push once  dextrose 50% Injectable 25 Gram(s) IV Push once  dextrose 50% Injectable 12.5 Gram(s) IV Push once  FLUoxetine 80 milliGRAM(s) Oral at bedtime  glucagon  Injectable 1 milliGRAM(s) IntraMuscular once  hydrocortisone 1% Cream 1 Application(s) Topical daily  hydrocortisone 2.5% Rectal Cream 1 Application(s) Rectal two times a day  HYDROmorphone PCA (1 mG/mL) 30 milliLiter(s) PCA Continuous PCA Continuous  insulin lispro (ADMELOG) corrective regimen sliding scale   SubCutaneous three times a day before meals  levalbuterol Inhalation 1.25 milliGRAM(s) Inhalation every 6 hours  lidocaine   4% Patch 1 Patch Transdermal daily  memantine 5 milliGRAM(s) Oral at bedtime  memantine 10 milliGRAM(s) Oral with breakfast  methadone    Tablet 10 milliGRAM(s) Oral at bedtime  nafcillin  IVPB 2 Gram(s) IV Intermittent every 4 hours  octreotide  Injectable 100 MICROGram(s) SubCutaneous two times a day  OLANZapine 2.5 milliGRAM(s) Oral at bedtime  pantoprazole  Injectable 40 milliGRAM(s) IV Push every 12 hours  potassium chloride    Tablet ER 40 milliEquivalent(s) Oral every 4 hours  predniSONE   Tablet 10 milliGRAM(s) Oral daily  pregabalin 200 milliGRAM(s) Oral every 8 hours    MEDICATIONS  (PRN):  acetaminophen     Tablet .. 650 milliGRAM(s) Oral every 6 hours PRN Temp greater or equal to 38C (100.4F), Mild Pain (1 - 3)  aluminum hydroxide/magnesium hydroxide/simethicone Suspension 30 milliLiter(s) Oral every 4 hours PRN Dyspepsia  benzonatate 100 milliGRAM(s) Oral every 8 hours PRN Cough  dextrose Oral Gel 15 Gram(s) Oral once PRN Blood Glucose LESS THAN 70 milliGRAM(s)/deciliter  guaifenesin/dextromethorphan Oral Liquid 10 milliLiter(s) Oral every 4 hours PRN Cough  HYDROmorphone  Injectable 0.5 milliGRAM(s) IV Push every 3 hours PRN Severe Pain (7 - 10)  melatonin 3 milliGRAM(s) Oral at bedtime PRN Insomnia  naloxone Injectable 0.1 milliGRAM(s) IV Push every 3 minutes PRN For ANY of the following changes in patient status:  A. RR LESS THAN 10 breaths per minute, B. Oxygen saturation LESS THAN 90%, C. Sedation score of 6  ondansetron Injectable 4 milliGRAM(s) IV Push every 8 hours PRN Nausea and/or Vomiting  sodium chloride 0.65% Nasal 1 Spray(s) Both Nostrils five times a day PRN Nasal Congestion        I&O's Summary    28 Jan 2025 07:01  -  29 Jan 2025 07:00  --------------------------------------------------------  IN: 300 mL / OUT: 650 mL / NET: -350 mL        PHYSICAL EXAM:  Vital Signs Last 24 Hrs  T(C): 36.4 (29 Jan 2025 05:31), Max: 36.9 (29 Jan 2025 00:20)  T(F): 97.6 (29 Jan 2025 05:31), Max: 98.4 (29 Jan 2025 00:20)  HR: 82 (29 Jan 2025 05:31) (69 - 86)  BP: 108/68 (29 Jan 2025 05:31) (97/60 - 115/74)  BP(mean): --  RR: 18 (29 Jan 2025 05:31) (18 - 19)  SpO2: 98% (29 Jan 2025 05:31) (96% - 98%)    Parameters below as of 29 Jan 2025 05:31  Patient On (Oxygen Delivery Method): room air          CONSTITUTIONAL: No apparent distress  HEENT: Normocephalic, Atraumatic.   RESPIRATORY:  lungs are clear to auscultation bilaterally  CARDIOVASCULAR: Regular rate and rhythm, 1+ lower extremity edema  ABDOMEN: Soft, non-distended, nontender to palpation, +BS  PSYCH: thoughts linear, affect appropriate  NEUROLOGY: Alert, Oriented x3    LABS:                        7.5    4.86  )-----------( 147      ( 29 Jan 2025 06:15 )             25.0     01-29    138  |  103  |  7.0[L]  ----------------------------<  107[H]  3.0[L]   |  26.0  |  0.55    Ca    8.0[L]      29 Jan 2025 06:15            Urinalysis Basic - ( 29 Jan 2025 06:15 )    Color: x / Appearance: x / SG: x / pH: x  Gluc: 107 mg/dL / Ketone: x  / Bili: x / Urobili: x   Blood: x / Protein: x / Nitrite: x   Leuk Esterase: x / RBC: x / WBC x   Sq Epi: x / Non Sq Epi: x / Bacteria: x        CAPILLARY BLOOD GLUCOSE      POCT Blood Glucose.: 114 mg/dL (29 Jan 2025 07:43)  POCT Blood Glucose.: 87 mg/dL (28 Jan 2025 16:39)  POCT Blood Glucose.: 141 mg/dL (28 Jan 2025 11:42)  POCT Blood Glucose.: 131 mg/dL (28 Jan 2025 08:12)        RADIOLOGY & ADDITIONAL TESTS:  Results Reviewed: Y  Imaging Personally Reviewed: N  Electrocardiogram Personally Reviewed: N

## 2025-01-29 NOTE — DISCHARGE NOTE PROVIDER - PROVIDER TOKENS
PROVIDER:[TOKEN:[[id number]],FOLLOWUP:[2 weeks]] PROVIDER:[TOKEN:[[id number]],FOLLOWUP:[2 weeks]],PROVIDER:[TOKEN:[[id number]],ESTABLISHEDPATIENT:[T]],FREE:[LAST:[primary care physician],PHONE:[(   )    -],FAX:[(   )    -]]

## 2025-01-29 NOTE — PROCEDURE NOTE - NSSECUREBY_VASC_A_CORE
stabilization device/sterile occulsive dressing/sterile pressure dressing/tape
stabilization device/sterile occulsive dressing
stabilization device/sterile occulsive dressing/sterile pressure dressing/tape

## 2025-01-29 NOTE — PROGRESS NOTE ADULT - SUBJECTIVE AND OBJECTIVE BOX
39y  Female with h/o metastatic breast cancer ER/NC Neg, HER-2 + on chemotherapy (last dose 12/26/24) (mets to lung, liver, spleen, spine, bone and brain), gastritis w/ intermittent episodes of dark stool (follows w/ Dr. Rosado GI and is on PPI and octreotide daily). Patient presented 1/5 with c/o worsening SOB.  She was seen in ED 1/2/25 for for weakness and SOB at which time she was found to have Hb of 5 requiring PRBC transfusion and positive for Flu A and started on tamiflu and was discharged from the ED on 1/3/25.  Her respiratory status has worsened since then w/ productive cough associated with body aches and chills.  Denies sick contacts but has had many frequent hospital visits. Patient has been afebrile, no leukocytosis. Was placed on BIPAP for Worsening respiratory status. continued on Tamiflu. Vancomycn and Zosyn was added. Blood cultures with MSSA.     PAST MEDICAL & SURGICAL HISTORY:  H/O compression fracture of spine  Anxiety  Metastatic breast cancer  H/O pleural effusion  Pericardial effusion  S/P tonsillectomy  H/O chest tube placement  12/23/21  S/P pericardiocentesis  12/28/21    Allergies  pertuzumab (Other (Severe))  Perjeta (Other (Severe))    MEDICATIONS  (STANDING):  acetylcysteine 10%  Inhalation 4 milliLiter(s) Inhalation every 6 hours  aminocaproic acid Tablet 4 Gram(s) Oral every 6 hours  chlorhexidine 2% Cloths 1 Application(s) Topical daily  dextrose 5%. 1000 milliLiter(s) (50 mL/Hr) IV Continuous <Continuous>  dextrose 5%. 1000 milliLiter(s) (100 mL/Hr) IV Continuous <Continuous>  dextrose 50% Injectable 25 Gram(s) IV Push once  dextrose 50% Injectable 12.5 Gram(s) IV Push once  dextrose 50% Injectable 25 Gram(s) IV Push once  FLUoxetine Solution 80 milliGRAM(s) Oral at bedtime  glucagon  Injectable 1 milliGRAM(s) IntraMuscular once  hydrocortisone 1% Cream 1 Application(s) Topical daily  hydrocortisone 2.5% Rectal Cream 1 Application(s) Rectal two times a day  HYDROmorphone PCA (1 mG/mL) 30 milliLiter(s) PCA Continuous PCA Continuous  insulin lispro (ADMELOG) corrective regimen sliding scale   SubCutaneous three times a day before meals  levalbuterol Inhalation 1.25 milliGRAM(s) Inhalation every 6 hours  memantine 5 milliGRAM(s) Oral at bedtime  memantine 10 milliGRAM(s) Oral with breakfast  methadone    Tablet 10 milliGRAM(s) Oral at bedtime  methadone    Tablet 5 milliGRAM(s) Oral <User Schedule>  methylphenidate 20 milliGRAM(s) Oral <User Schedule>  nafcillin  IVPB 2 Gram(s) IV Intermittent every 4 hours  octreotide  Injectable 100 MICROGram(s) SubCutaneous two times a day  OLANZapine 2.5 milliGRAM(s) Oral at bedtime  pantoprazole  Injectable 40 milliGRAM(s) IV Push every 12 hours  predniSONE   Tablet 20 milliGRAM(s) Oral daily  pregabalin 200 milliGRAM(s) Oral every 8 hours  sodium chloride 0.9% with potassium chloride 20 mEq/L 1000 milliLiter(s) (100 mL/Hr) IV Continuous <Continuous>    MEDICATIONS  (PRN):  acetaminophen     Tablet .. 650 milliGRAM(s) Oral every 6 hours PRN Temp greater or equal to 38C (100.4F), Mild Pain (1 - 3)  aluminum hydroxide/magnesium hydroxide/simethicone Suspension 30 milliLiter(s) Oral every 4 hours PRN Dyspepsia  benzonatate 100 milliGRAM(s) Oral every 8 hours PRN Cough  dextrose Oral Gel 15 Gram(s) Oral once PRN Blood Glucose LESS THAN 70 milliGRAM(s)/deciliter  guaifenesin/dextromethorphan Oral Liquid 10 milliLiter(s) Oral every 4 hours PRN Cough  HYDROmorphone  Injectable 0.5 milliGRAM(s) IV Push every 3 hours PRN Severe Pain (7 - 10)  melatonin 3 milliGRAM(s) Oral at bedtime PRN Insomnia  naloxone Injectable 0.1 milliGRAM(s) IV Push every 3 minutes PRN For ANY of the following changes in patient status:  A. RR LESS THAN 10 breaths per minute, B. Oxygen saturation LESS THAN 90%, C. Sedation score of 6  ondansetron Injectable 4 milliGRAM(s) IV Push every 8 hours PRN Nausea and/or Vomiting    Vital Signs Last 24 Hrs  T(C): 36.4 (29 Jan 2025 05:31), Max: 36.9 (29 Jan 2025 00:20)  T(F): 97.6 (29 Jan 2025 05:31), Max: 98.4 (29 Jan 2025 00:20)  HR: 82 (29 Jan 2025 05:31) (69 - 86)  BP: 108/68 (29 Jan 2025 05:31) (97/60 - 115/74)  BP(mean): --  RR: 18 (29 Jan 2025 05:31) (18 - 19)  SpO2: 98% (29 Jan 2025 05:31) (96% - 98%)    Parameters below as of 29 Jan 2025 05:31  Patient On (Oxygen Delivery Method): room air      PE:  NAD  On O2 NC  bilateral rales  abd soft, nd, nt  a/o x 3      CBC                          7.5    4.86  )-----------( 147      ( 29 Jan 2025 06:15 )             25.0                             7.3    3.94  )-----------( 134      ( 28 Jan 2025 05:55 )             24.7                             8.7    6.67  )-----------( 155      ( 27 Jan 2025 06:18 )             29.0         Chem:       01-29    138  |  103  |  7.0[L]  ----------------------------<  107[H]  3.0[L]   |  26.0  |  0.55    Ca    8.0[L]      29 Jan 2025 06:15      01-28    142  |  105  |  7.5[L]  ----------------------------<  152[H]  3.5   |  26.0  |  0.58    Ca    8.1[L]      28 Jan 2025 05:55        01-27    140  |  104  |  7.6[L]  ----------------------------<  130[H]  3.9   |  25.0  |  0.53    Ca    8.3[L]      27 Jan 2025 06:18        01-21    139  |  105  |  4.5[L]  ----------------------------<  132[H]  3.8   |  24.0  |  0.42[L]    Ca    8.0[L]      21 Jan 2025 06:58    TPro  5.1[L]  /  Alb  2.3[L]  /  TBili  0.5  /  DBili  x   /  AST  19  /  ALT  12  /  AlkPhos  140[H]  01-21 01-20    138  |  103  |  4.9[L]  ----------------------------<  167[H]  3.9   |  25.0  |  0.51    Ca    8.0[L]      20 Jan 2025 07:25    TPro  5.5[L]  /  Alb  2.3[L]  /  TBili  0.6  /  DBili  x   /  AST  19  /  ALT  13  /  AlkPhos  165[H]  01-20 01-17    139  |  104  |  8.7  ----------------------------<  163[H]  2.9[LL]   |  26.0  |  0.70    Ca    7.3[L]      17 Jan 2025 06:45  Phos  4.2     01-17  Mg     1.5     01-17    TPro  5.0[L]  /  Alb  2.1[L]  /  TBili  0.8  /  DBili  0.3  /  AST  17  /  ALT  13  /  AlkPhos  127[H]  01-16        MARYANN performed revealing:   Left Ventricle: Left ventricular systolic function is normal with an ejection fraction visually estimated at 55 to 60%. There are no regional wall motion abnormalities seen. Unable to assess left ventricular diastolic function due to insufficient data.  Right Ventricle: The right ventricular cavity is normal in size and right ventricular systolic function is normal.  Left Atrium: The left atrium is normal in size. There is no evidence of left atrial appendage thrombus.  Right Atrium: The right atrium is normal in size.  Interatrial Septum: Lipomatous interatrial septal hypertrophy present. Agitated saline injection was negative for intracardiac shunt.  Aortic Valve: The aortic valve is tricuspid with normal leaflet excursion. There is no evidence of aortic regurgitation.  Mitral Valve: Structurally normal mitral valve with normal leaflet excursion. There is trace mitral regurgitation. There is normal pulmonary venous flow.  Tricuspid Valve: Structurally normal tricuspid valve with normal leaflet excursion. There is mild tricuspid regurgitation. There is no echocardiographic evidence of pulmonary hypertension. Estimated pulmonary artery systolic pressure is 17 mmHg. IVC was evaluated but not visualized.  Pulmonic Valve: Structurally normal pulmonic valve with normal leaflet excursion. There is no evidence of pulmonic regurgitation.  Pulmonary Artery: The main pulmonary artery is normal in size, origin, and position.  Aorta: The aortic root appears normal in size.  Pericardium: No pericardial effusion seen.

## 2025-01-29 NOTE — PROCEDURE NOTE - NSINFORMCONSENT_GEN_A_CORE
Benefits, risks, and possible complications of procedure explained to patient/caregiver who verbalized understanding and gave written consent.
Benefits, risks, and possible complications of procedure explained to patient/caregiver who verbalized understanding and gave verbal consent.
Benefits, risks, and possible complications of procedure explained to patient/caregiver who verbalized understanding and gave written consent.

## 2025-01-29 NOTE — PROCEDURE NOTE - PROCEDURE DATE TIME, MLM
10-Cristhian-2025 18:04
27-Jan-2025 16:17
27-Jan-2025 23:36
10-Cristhian-2025 18:08
23-Jan-2025
29-Jan-2025

## 2025-01-29 NOTE — DISCHARGE NOTE PROVIDER - NSDCCPCAREPLAN_GEN_ALL_CORE_FT
PRINCIPAL DISCHARGE DIAGNOSIS  Diagnosis: Acute blood loss anemia  Assessment and Plan of Treatment: - PPI/OCTRIO  - Ct without any evidence of acute bleed  - no plan for inpt scope at this time   - discharge on amicar 3.5g q6h x 7 days per onc, plan to taper outpatient w/ oncology         SECONDARY DISCHARGE DIAGNOSES  Diagnosis: MSSA bacteremia  Assessment and Plan of Treatment: - blood clx positive 1/5, repeat bl c/s (1/11) ngtd  - Continue nafcillin   - f/u with ID    Diagnosis: Sepsis  Assessment and Plan of Treatment: - persistently positive RVP for flu A  - s/p tamiflu   - repeat CT 1/8 shows improved pleural effusion but worsening GGOs, unclear if infectious or malignant etiology  - repeat CT 1/9 with similar findings  - CT PE to evaluate consolidation and PE given persistent hypoxia negative for PE and with improving consolidations  - tapered off steroids    Diagnosis: Breast cancer  Assessment and Plan of Treatment: - outpt follow up with onc   - cancer related pain:  c/w pregabalin, methylphenidate (confirmed on ISTOP Reference #: [id number])  - prescribed dilaudid 8mg q4h prn for severe pain    Diagnosis: Norovirus  Assessment and Plan of Treatment: - GI PCR  positive norovirus,Giadia  - Albendazole per ID (dc on 01/22 -- completed 5 day course)  - ID on board, recs noted  - diarrhea resolved

## 2025-01-29 NOTE — PROGRESS NOTE ADULT - SUBJECTIVE AND OBJECTIVE BOX
[facility]  INFECTIOUS DISEASES at [place] / [place] / [place]  =======================================================                              Saalzar Toro MD                              Professor Emeritus:  Dr Warner Mcintosh MD            Diplomates American Board of Internal Medicine & Infectious Diseases                                   Tel  [phone] Fax [phone]                                  Hospital Consult line:  [phone]  =======================================================      [place] MYERS [id number]    Follow up: MSSA bacteremia    No fevers   Diarrhea improved       Allergies:  pertuzumab (Other (Severe))  Perjeta (Other (Severe))        REVIEW OF SYSTEMS:  CONSTITUTIONAL:  No Fever or chills  HEENT:   No diplopia or blurred vision.  No earache, sore throat or runny nose.  CARDIOVASCULAR:  No Chest Pain  RESPIRATORY:  No cough, shortness of breath  GASTROINTESTINAL:  No nausea, vomiting + diarrhea.  GENITOURINARY:  No dysuria, frequency or urgency. No Blood in urine  MUSCULOSKELETAL:  no joint aches, no muscle pain  SKIN:  No change in skin, hair or nails.  NEUROLOGIC:  No Headaches      Physical Exam:  GEN: NAD  HEENT: normocephalic and atraumatic.    NECK: Supple.   LUNGS: CTA B/L.  HEART: RRR  ABDOMEN: Soft, NT, ND.  +BS.    : No CVA tenderness  EXTREMITIES: Without  edema.  MSK: No joint swelling  NEUROLOGIC: No Focal Deficits   SKIN: No rash      Vitals:  T(F): 97.6 (29 Jan 2025 05:31), Max: 98.4 (29 Jan 2025 00:20)  HR: 82 (29 Jan 2025 05:31)  BP: 108/68 (29 Jan 2025 05:31)  RR: 18 (29 Jan 2025 05:31)  SpO2: 98% (29 Jan 2025 05:31) (96% - 98%)  temp max in last 48H T(F): , Max: 98.6 (01-27-25 @ 11:25)    Current Antibiotics:  nafcillin  IVPB 2 Gram(s) IV Intermittent every 4 hours    Other medications:  acetylcysteine 10%  Inhalation 4 milliLiter(s) Inhalation every 6 hours  aminocaproic acid Tablet 3.5 Gram(s) Oral every 6 hours  chlorhexidine 2% Cloths 1 Application(s) Topical daily  dextrose 5%. 1000 milliLiter(s) IV Continuous <Continuous>  dextrose 5%. 1000 milliLiter(s) IV Continuous <Continuous>  dextrose 50% Injectable 25 Gram(s) IV Push once  dextrose 50% Injectable 25 Gram(s) IV Push once  dextrose 50% Injectable 12.5 Gram(s) IV Push once  FLUoxetine 80 milliGRAM(s) Oral at bedtime  glucagon  Injectable 1 milliGRAM(s) IntraMuscular once  hydrocortisone 1% Cream 1 Application(s) Topical daily  hydrocortisone 2.5% Rectal Cream 1 Application(s) Rectal two times a day  HYDROmorphone PCA (1 mG/mL) 30 milliLiter(s) PCA Continuous PCA Continuous  insulin lispro (ADMELOG) corrective regimen sliding scale   SubCutaneous three times a day before meals  levalbuterol Inhalation 1.25 milliGRAM(s) Inhalation every 6 hours  lidocaine   4% Patch 1 Patch Transdermal daily  memantine 5 milliGRAM(s) Oral at bedtime  memantine 10 milliGRAM(s) Oral with breakfast  methadone    Tablet 10 milliGRAM(s) Oral at bedtime  octreotide  Injectable 100 MICROGram(s) SubCutaneous two times a day  OLANZapine 2.5 milliGRAM(s) Oral at bedtime  pantoprazole  Injectable 40 milliGRAM(s) IV Push every 12 hours  potassium chloride    Tablet ER 40 milliEquivalent(s) Oral every 4 hours  predniSONE   Tablet 10 milliGRAM(s) Oral daily  pregabalin 200 milliGRAM(s) Oral every 8 hours                            7.5    4.86  )-----------( 147      ( 29 Jan 2025 06:15 )             25.0     01-29    138  |  103  |  7.0[L]  ----------------------------<  107[H]  3.0[L]   |  26.0  |  0.55    Ca    8.0[L]      29 Jan 2025 06:15      RECENT CULTURES:  01-17 @ 13:02 .Blood BLOOD     No growth at 5 days    01-17 @ 12:58 .Blood BLOOD     No growth at 5 days      WBC Count: 4.86 K/uL (01-29-25 @ 06:15)  WBC Count: 3.94 K/uL (01-28-25 @ 05:55)  WBC Count: 6.67 K/uL (01-27-25 @ 06:18)  WBC Count: 4.73 K/uL (01-26-25 @ 05:56)  WBC Count: 5.15 K/uL (01-25-25 @ 06:10)    Creatinine: 0.55 mg/dL (01-29-25 @ 06:15)  Creatinine: 0.58 mg/dL (01-28-25 @ 05:55)  Creatinine: 0.53 mg/dL (01-27-25 @ 06:18)  Creatinine: 0.55 mg/dL (01-26-25 @ 05:56)  Creatinine: 0.51 mg/dL (01-25-25 @ 06:10)    Procalcitonin: 0.15 ng/mL (01-21-25 @ 06:58)     SARS-CoV-2: NotDetec (01-05-25 @ 10:54)  SARS-CoV-2 Result: NotDetec (01-02-25 @ 19:20)          < from: MARYANN W or WO Ultrasound Enhancing Agent (01.27.25 @ 13:36) >  CONCLUSIONS:      1. Left ventricular systolic function is normal with an ejection fraction visually estimated at 55 to 60 %.   2. Normal right ventricular cavity size and normalright ventricular systolic function.   3. Mild tricuspid regurgitation.   4. Agitated saline injection was negative for intracardiac shunt.   5. No evidence of left atrial appendage thrombus.   6. No echocardiographic evidence of vegetations.    < end of copied text >        < from: TTE Limited W or WO Ultrasound Enhancing Agent (01.06.25 @ 12:55) >  CONCLUSIONS:      1. Technically difficult image quality.   2. Left ventricular systolic function is normal with an ejection fraction visually estimated at 65 to 70 %.   3. Normal right ventricular cavity size, with normal wall thickness, and normal right ventricular systolic function.   4. Compared to the transthoracic echocardiogram performed on 7/21/2024, there have been no significant interval changes.    < end of copied text >

## 2025-01-29 NOTE — PROCEDURE NOTE - NSPOSTCAREGUIDE_GEN_A_CORE
Verbal/written post procedure instructions were given to patient/caregiver/Keep the cast/splint/dressing clean and dry
Verbal/written post procedure instructions were given to patient/caregiver
Verbal/written post procedure instructions were given to patient/caregiver/Instructed patient/caregiver regarding signs and symptoms of infection
Verbal/written post procedure instructions were given to patient/caregiver/Instructed patient/caregiver to follow-up with primary care physician/Instructed patient/caregiver regarding signs and symptoms of infection/Keep the cast/splint/dressing clean and dry/Care for catheter as per unit/ICU protocols
Verbal/written post procedure instructions were given to patient/caregiver/Instructed patient/caregiver to follow-up with primary care physician/Instructed patient/caregiver regarding signs and symptoms of infection/Keep the cast/splint/dressing clean and dry/Care for catheter as per unit/ICU protocols

## 2025-01-29 NOTE — PROCEDURE NOTE - NSINDICATIONS_GEN_A_CORE
PCA pump, Niacin/antibiotic therapy/other
other
antibiotic therapy/long term chemotherapy
antibiotic therapy/venous access
venous access

## 2025-01-29 NOTE — DISCHARGE NOTE PROVIDER - CARE PROVIDERS DIRECT ADDRESSES
[email or web address],DirectAddress_Unknown,DirectAddress_Unknown

## 2025-01-29 NOTE — PROCEDURE NOTE - NSPROCNAME_GEN_A_CORE
PICC Line Insertion
Interventional Radiology
Peripheral Line Insertion
Midline Insertion
Midline Insertion
Peripheral Line Insertion

## 2025-01-29 NOTE — PROCEDURE NOTE - ADDITIONAL PROCEDURE DETAILS
5FR dual 47CM 30CIRC [employer] POWER PICC LINE good flash ns flush left basilic vein 5FR dual 47CM 30CIRC BARD POWER PICC LINE good flash ns flush left basilic vein    s/p Chest X-Ray catheter to be adjusted cxr to follow; Discussed with radiologist to withdraw 4CM for optimal placement of the distal tip.    There is now 4CM of insertable catheter external to patient

## 2025-01-29 NOTE — DISCHARGE NOTE PROVIDER - CARE PROVIDER_API CALL
Reagan Ulola  Infectious Disease  [address]  [place] [address]  Phone: [phone]  Fax: [phone]  Follow Up Time: 2 weeks   Reagan Ulloa  Infectious Disease  [address]  [place] [address]  Phone: [phone]  Fax: [phone]  Follow Up Time: 2 weeks    Angie Chapa  Medical Oncology  [address] [place] [address]  Phone: [phone]  Fax: [phone]  Established Patient  Follow Up Time:     primary care physician,   Phone: (   )    -  Fax: (   )    -  Follow Up Time:

## 2025-01-29 NOTE — PROGRESS NOTE ADULT - ASSESSMENT
39yoF w/ PMHx of metastatic breast cancer ER/IN Neg, HER-2 + on chemotherapy (last dose 12/26/24) (mets to lung, liver, spleen, spine, bone and brain), gastritis w/ intermittent episodes of dark stool (follows w/ Dr. Rosado GI and is on PPI and octreotide daily). Patient presented 1/5 with c/o worsening SOB. She was seen in ED 1/2/25 for weakness and SOB at which time she was found to have Hb of 5 requiring PRBC transfusion and positive for Flu A and started on tamiflu and was discharged from the ED on 1/3/25.  Her respiratory status has worsened since then w/ productive cough associated with body aches and chills. Admitted for sepsis and acute hypoxic respiratory failure with flu A s/p HFNC for worsening respiratory status and s/p course of Tamiflu. Vancomycin and Zosyn was added for possible superimposed bacterial pna. Blood cultures with MSSA. Cardiology consultation requested for evaluation of endocarditis. Blood cultures 1/5, 1/7, 1/9 reporting MSSA , Repeat blood cultures ngtd on 1/11/25, Sputum Cx 1/6 Staphylococcus aureus. MARYANN hold for active gib requring transfusion. Pt had large BM likley diverticular bleed. Had recent scopy. Gi following. ct abd/pelvis w/iv contrast no active bleed (1/13). Urine Cx 1/5 reporting 10k - 49k Escherichia coli  of ? significance  since UA not concerning for UTI.  RVP/COVID 19 PCR + Influenza A.GI PCR  positive norovirus,Giardia.    #Acute blood loss anemia   #GIB  - s/p multiple transfusions during this hospital stay  - goal hgb of 7   - CTA abd/pelvis no active bleed   - PPI/OCTRIO  - GI eval with no plan for scopes 2/2 recently done, pt has had multiple EGD/colonoscopies in past as well.   - per GI, no plan for any endoscopy, pt will likely be transfusion dependent  - hem/onc started Amicar on 1/16  - hem/onc following, pt responding well to amicar, plan to continue outpt, will assess need for transfusions on outpt basis and will set up as needed pending ongoing response to amicar   - monitor BMs, Hgb    #MSSA bacteremia  - blood clx positive 1/5, repeat bl c/s (1/11) ngtd  - ID following  - Continue nafcillin (would need to be 6 weeks duration if no MARYANN done)  - Cardiology following, MARYANN w/o IE  - asked ID to order mid v. picc, ID to clarify with heme/onc regarding mid v. picc     #Sepsis 2/2 Flu A w/ superimposed multifocal PNA  #Acute hypoxic respiratory failure 2/2 Flu A w/ superimposed multifocal PNA   - on NC, comfortable  - persistently positive RVP for flu A  - s/p tamiflu   - repeat CT 1/8 shows improved pleural effusion but worsening GGOs, unclear if infectious or malignant etiology  - repeat CT 1/9 with similar findings  - CT PE to evaluate consolidation and PE given persistent hypoxia negative for PE and with improving consolidations  - po steroid tapering dose; will change prednisone 20 -> 10mg qd   - c/w abx per ID recs  - sputum clx growing moderate staph aureus   - TTE EF 65-70%, no WMA  - wean O2     #Chronic normocytic anemia 2/2 metastatic breast cancer on chemo and 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   #Thrombocytopenia likely 2/2 sepsis   #Breast cancer ER/IN Neg, HER-2 pos on chemotherapy w/ mets to lung, liver, spleen, spine, bone and brain  - Baseline Hb ranges 8-9    - s/p prbc transfusion on 1/2, 1/8 and 1/9,1/11,1/13,1/14  - trend CBC, transfuse for hgb <7  - c/w home sub q octreotide and IV protonix  - Monitor CBC and transfuse for Hb<7 and plts<20K in setting of sepsis   - [facility] following  - monitor on tele and     #Nororvirus, Giadia gastroenteritis   - GI PCR  positive norovirus,Giadia  - Albendazole per ID (dc on 01/22 -- completed 5 day course)  - ID on board, recs noted  - diarrhea resolved     #Breast cancer ER/IN Neg, HER-2 pos on chemotherapy w/ mets to lung, liver, spleen, spine, bone and brain  - [facility] following, recommending holding all treatment   - c/w pregabalin, methylphenidate (confirmed on ISTOP Reference #: [id number])  - on dilaudid pca pump, continue while admitted, plan to discharge on po meds   - pain management reconsulted, adjusted pain regimen 1/24    #Thrombocytopenia  - likely from chemo/sepsis  - will monitor cbc    #LE edema, improving   - b/l duplex negative  - IV lasix prn  - leg elevation     dvt ppx scd  disposition: DC planning pending IV line and repeat PT

## 2025-01-29 NOTE — DISCHARGE NOTE PROVIDER - NSDCMRMEDTOKEN_GEN_ALL_CORE_FT
Dilaudid 4 mg oral tablet: 1 tab(s) orally 3 times a day as needed for  moderate pain  octreotide 100 mcg/mL injectable solution: 100 microgram(s) subcutaneously 2 times a day for GAVE-related bleeding  oseltamivir 75 mg oral capsule: 1 cap(s) orally 2 times a day  pregabalin 200 mg oral capsule: 1 cap(s) orally every 8 hours  Protonix 40 mg oral delayed release tablet: 1 tab(s) orally once a day  Tukysa 150 mg oral tablet: 2 tab(s) orally 2 times a day   Amicar 1.25 g/5 mL oral liquid: 14 milliliter(s) orally every 6 hours  Amicar 1.25 g/5 mL oral liquid: 14 milliliter(s) orally every 6 hours  diazePAM 2 mg oral tablet: 1 tab(s) orally every 8 hours as needed for  anxiety  Dilaudid 4 mg oral tablet: 1 tab(s) orally every 6 hours as needed for  moderate pain  Dilaudid 8 mg oral tablet: 1 tab(s) orally every 6 hours as needed for  severe pain  FLUoxetine 40 mg oral capsule: 2 cap(s) orally once a day (at bedtime)  methadone 10 mg oral tablet: 1 tab(s) orally once a day (at bedtime)  methadone 40 mg oral tablet: 1 tab(s) orally 2 times a day  Namenda 10 mg oral tablet: 1 tab(s) orally 2 times a day  octreotide 100 mcg/mL injectable solution: 100 microgram(s) subcutaneously 2 times a day for GAVE-related bleeding  pregabalin 200 mg oral capsule: 1 cap(s) orally every 8 hours  Protonix 40 mg oral delayed release tablet: 1 tab(s) orally once a day  Ritalin 10 mg oral tablet: 1 tab(s) orally once a day (at bedtime)  Ritalin 20 mg oral tablet: 1 tab(s) orally once a day (in the morning)  Tukysa 150 mg oral tablet: 2 tab(s) orally 2 times a day   Amicar 1.25 g/5 mL oral liquid: 14 milliliter(s) orally every 6 hours  Dilaudid 8 mg oral tablet: 1 tab(s) orally every 4 hours as needed for  severe pain MDD: 6 tabs  FLUoxetine 40 mg oral capsule: 2 cap(s) orally once a day (at bedtime)  lidocaine 4% topical film: Apply topically to affected area once a day  methadone 10 mg oral tablet: 1 tab(s) orally once a day (at bedtime)  methadone 5 mg oral tablet: 1 tab(s) orally 2 times a day MDD: 2 tabs  nafcillin: 2 gram(s) intravenous every 4 hours  Namenda 10 mg oral tablet: 1 tab(s) orally 2 times a day  octreotide 100 mcg/mL injectable solution: 100 microgram(s) subcutaneously 2 times a day for GAVE-related bleeding  OLANZapine 2.5 mg oral tablet: 1 tab(s) orally once a day (at bedtime)  pregabalin 200 mg oral capsule: 1 cap(s) orally every 8 hours  Protonix 40 mg oral delayed release tablet: 1 tab(s) orally 2 times a day  Ritalin 10 mg oral tablet: 1 tab(s) orally once a day (at bedtime)  Ritalin 20 mg oral tablet: 1 tab(s) orally once a day (in the morning)  Tukysa 150 mg oral tablet: 2 tab(s) orally 2 times a day

## 2025-01-29 NOTE — PROGRESS NOTE ADULT - ASSESSMENT
39y  Female with h/o metastatic breast cancer ER/[place] Neg, HER-2 + on chemotherapy (last dose 12/26/24) (mets to lung, liver, spleen, spine, bone and brain), gastritis w/ intermittent episodes of dark stool (follows w/ Dr. Rosado GI and is on PPI and octreotide daily). Patient presented 1/5 with c/o worsening SOB.  She was seen in ED 1/2/25 for for weakness and SOB at which time she was found to have Hb of 5 requiring PRBC transfusion and positive for Flu A and started on tamiflu and was discharged from the ED on 1/3/25.  Her respiratory status has worsened since then w/ productive cough associated with body aches and chills.  Denies sick contacts but has had many frequent hospital visits. Patient has been afebrile, no leukocytosis. Was placed on BIPAP for Worsening respiratory status. continued on Tamiflu. Vancomycn and Zosyn was added. Blood cultures with MSSA.     Metastatic breast cancer   - last dose of trastuzumab was on 12/26/24.  - Hold Tactinib for now until she sees Dr Chapa after discharge   - All treatment on hold at the moment.  - Remains on PCA pump    Cytopenias   - secondary to malignancy and acute illness and now GIB  - S/P multiple transfusions  - GI evaluated for large bloody bowel movement  Suspect rectal bleeding is due to diverticular bleed which are self-limiting - If patient becomes hemodynamically unstable, requiring multiple transfusions,   - CT A/P No evidence of active intraluminal extravasation of contrast.   - GI f/u noted.  no plans for scope.  Previously scoped in Nov. 2024. internal hemorrhoids, gastric erosions, no active bleeding site   - c/w home sub q octreotide and IV protonix  - Has been on  Amicar 4 grams Q 6 H for bleeding with good results  will titrate according to HGB and bleeding. goal of minimal effective dose to prevent bleeding.   - Decreased to 3.5 grams Q6H 1/27  - Hgb 7.5 today,   -Transfuse if hgb<7.0.      Respiratory failure   - multifocal pneumonia and flu+  - S/P oseltamivir    - Management as per primary team.  - TTE 1/6 with no veg  - Cardiology following- . S/P MARYANN as above    MSSA Bacteremia  - cultures persistently positive. pt afebrile.   -- S/P Port removal 1/10/25-  PICC line prior to d/c for outpt chemo   - She is concerned about access. Will place once closer to discharge.  + Noro virus and giardia lamblia  -- For Giardia, Completed 5 days of Albendazole   - ID following- - Since no veg on MARYANN and port removed, will plan on antibiotics till 2/6/25 (4 weeks) Continue Nafcillin 2gm IV [id number]  --Will plan for PICC for IVAB    Will need to transition to PO meds when pain controlled     Will follow                39y  Female with h/o metastatic breast cancer [place] Neg, HER-2 + on chemotherapy (last dose 12/26/24) (mets to lung, liver, spleen, spine, bone and brain), gastritis w/ intermittent episodes of dark stool (follows w/ Dr. Rosado GI and is on PPI and octreotide daily). Patient presented 1/5 with c/o worsening SOB.  She was seen in ED 1/2/25 for for weakness and SOB at which time she was found to have Hb of 5 requiring PRBC transfusion and positive for Flu A and started on tamiflu and was discharged from the ED on 1/3/25.  Her respiratory status has worsened since then w/ productive cough associated with body aches and chills.  Denies sick contacts but has had many frequent hospital visits. Patient has been afebrile, no leukocytosis. Was placed on BIPAP for Worsening respiratory status. continued on Tamiflu. Vancomycn and Zosyn was added. Blood cultures with MSSA.     Metastatic breast cancer   - last dose of trastuzumab was on 12/26/24.  - Hold Tactinib for now until she sees Dr Chapa after discharge   - All treatment on hold at the moment.  - Remains on PCA pump    Cytopenias   - secondary to malignancy and acute illness and now GIB  - S/P multiple transfusions  - GI evaluated for large bloody bowel movement  Suspect rectal bleeding is due to diverticular bleed which are self-limiting - If patient becomes hemodynamically unstable, requiring multiple transfusions,   - CT A/P No evidence of active intraluminal extravasation of contrast.   - GI f/u noted.  no plans for scope.  Previously scoped in Nov. 2024. internal hemorrhoids, gastric erosions, no active bleeding site   - c/w home sub q octreotide and IV protonix  - Has been on  Amicar 4 grams Q 6 H for bleeding with good results  will titrate according to HGB and bleeding. goal of minimal effective dose to prevent bleeding.   - Decreased to 3.5 grams Q6H 1/27  - Hgb 7.5 today,   -Transfuse if hgb<7.0.      Respiratory failure   - multifocal pneumonia and flu+  - S/P oseltamivir    - Management as per primary team.  - TTE 1/6 with no veg  - Cardiology following- . S/P MARYANN as above    MSSA Bacteremia  - cultures persistently positive. pt afebrile.   -- S/P Port removal 1/10/25-  PICC line prior to d/c for outpt chemo   - She is concerned about access. Will place once closer to discharge.  + Noro virus and giardia lamblia  -- For Giardia, Completed 5 days of Albendazole   - ID following- - Since no veg on MARYANN and port removed, will plan on antibiotics till 2/6/25 (4 weeks) Continue Nafcillin 2gm IV [id number]  --S/P left; basilic vein PICC placement at bedside by PA     Will need to transition to PO meds when pain controlled     Will follow

## 2025-01-30 LAB
ANION GAP SERPL CALC-SCNC: 10 MMOL/L — SIGNIFICANT CHANGE UP (ref 5–17)
BLD GP AB SCN SERPL QL: SIGNIFICANT CHANGE UP
BUN SERPL-MCNC: 8.7 MG/DL — SIGNIFICANT CHANGE UP (ref 8–20)
CALCIUM SERPL-MCNC: 8.4 MG/DL — SIGNIFICANT CHANGE UP (ref 8.4–10.5)
CHLORIDE SERPL-SCNC: 101 MMOL/L — SIGNIFICANT CHANGE UP (ref 96–108)
CO2 SERPL-SCNC: 27 MMOL/L — SIGNIFICANT CHANGE UP (ref 22–29)
CREAT SERPL-MCNC: 0.62 MG/DL — SIGNIFICANT CHANGE UP (ref 0.5–1.3)
EGFR: 117 ML/MIN/1.73M2 — SIGNIFICANT CHANGE UP
GLUCOSE BLDC GLUCOMTR-MCNC: 122 MG/DL — HIGH (ref 70–99)
GLUCOSE BLDC GLUCOMTR-MCNC: 166 MG/DL — HIGH (ref 70–99)
GLUCOSE BLDC GLUCOMTR-MCNC: 235 MG/DL — HIGH (ref 70–99)
GLUCOSE BLDC GLUCOMTR-MCNC: 82 MG/DL — SIGNIFICANT CHANGE UP (ref 70–99)
GLUCOSE SERPL-MCNC: 192 MG/DL — HIGH (ref 70–99)
HCT VFR BLD CALC: 26.9 % — LOW (ref 34.5–45)
HGB BLD-MCNC: 7.9 G/DL — LOW (ref 11.5–15.5)
MAGNESIUM SERPL-MCNC: 1.7 MG/DL — SIGNIFICANT CHANGE UP (ref 1.6–2.6)
MCHC RBC-ENTMCNC: 29.4 G/DL — LOW (ref 32–36)
MCHC RBC-ENTMCNC: 29.5 PG — SIGNIFICANT CHANGE UP (ref 27–34)
MCV RBC AUTO: 100.4 FL — HIGH (ref 80–100)
PLATELET # BLD AUTO: 144 K/UL — LOW (ref 150–400)
POTASSIUM SERPL-MCNC: 3.9 MMOL/L — SIGNIFICANT CHANGE UP (ref 3.5–5.3)
POTASSIUM SERPL-SCNC: 3.9 MMOL/L — SIGNIFICANT CHANGE UP (ref 3.5–5.3)
RBC # BLD: 2.68 M/UL — LOW (ref 3.8–5.2)
RBC # FLD: 25.1 % — HIGH (ref 10.3–14.5)
SODIUM SERPL-SCNC: 138 MMOL/L — SIGNIFICANT CHANGE UP (ref 135–145)
WBC # BLD: 6.83 K/UL — SIGNIFICANT CHANGE UP (ref 3.8–10.5)
WBC # FLD AUTO: 6.83 K/UL — SIGNIFICANT CHANGE UP (ref 3.8–10.5)

## 2025-01-30 PROCEDURE — 99232 SBSQ HOSP IP/OBS MODERATE 35: CPT

## 2025-01-30 PROCEDURE — G0545: CPT

## 2025-01-30 RX ORDER — METHYLPHENIDATE HYDROCHLORIDE 36 MG/1
1 TABLET, EXTENDED RELEASE ORAL
Refills: 0 | DISCHARGE

## 2025-01-30 RX ORDER — METHADONE HYDROCHLORIDE 5 MG/5ML
1 SOLUTION ORAL
Refills: 0 | DISCHARGE

## 2025-01-30 RX ORDER — AMINOCAPROIC ACID 1000 MG/1
14 TABLET ORAL
Qty: 392 | Refills: 0
Start: 2025-01-30 | End: 2025-02-05

## 2025-01-30 RX ORDER — MEMANTINE HYDROCHLORIDE 7 MG/1
1 CAPSULE, EXTENDED RELEASE ORAL
Refills: 0 | DISCHARGE

## 2025-01-30 RX ORDER — TUCATINIB 150 MG/1
2 TABLET ORAL
Refills: 0 | DISCHARGE

## 2025-01-30 RX ORDER — METHYLPHENIDATE HYDROCHLORIDE 36 MG/1
20 TABLET, EXTENDED RELEASE ORAL
Refills: 0 | Status: DISCONTINUED | OUTPATIENT
Start: 2025-01-30 | End: 2025-01-31

## 2025-01-30 RX ORDER — FLUOXETINE HCL 10 MG
2 CAPSULE ORAL
Refills: 0 | DISCHARGE

## 2025-01-30 RX ORDER — HYDROMORPHONE HYDROCHLORIDE 4 MG/ML
0.5 INJECTION, SOLUTION INTRAMUSCULAR; INTRAVENOUS; SUBCUTANEOUS ONCE
Refills: 0 | Status: DISCONTINUED | OUTPATIENT
Start: 2025-01-30 | End: 2025-01-30

## 2025-01-30 RX ADMIN — NAFCILLIN INJECTION 200 GRAM(S): 2 POWDER, FOR SOLUTION INTRAMUSCULAR; INTRAMUSCULAR; INTRAVENOUS at 15:00

## 2025-01-30 RX ADMIN — Medication 1.25 MILLIGRAM(S): at 21:32

## 2025-01-30 RX ADMIN — Medication 1.25 MILLIGRAM(S): at 09:55

## 2025-01-30 RX ADMIN — HYDROMORPHONE HYDROCHLORIDE 8 MILLIGRAM(S): 4 INJECTION, SOLUTION INTRAMUSCULAR; INTRAVENOUS; SUBCUTANEOUS at 06:05

## 2025-01-30 RX ADMIN — HYDROMORPHONE HYDROCHLORIDE 0.5 MILLIGRAM(S): 4 INJECTION, SOLUTION INTRAMUSCULAR; INTRAVENOUS; SUBCUTANEOUS at 21:50

## 2025-01-30 RX ADMIN — PREDNISONE 10 MILLIGRAM(S): 5 TABLET ORAL at 05:03

## 2025-01-30 RX ADMIN — METHYLPHENIDATE HYDROCHLORIDE 20 MILLIGRAM(S): 36 TABLET, EXTENDED RELEASE ORAL at 09:24

## 2025-01-30 RX ADMIN — Medication 80 MILLIGRAM(S): at 21:08

## 2025-01-30 RX ADMIN — HYDROMORPHONE HYDROCHLORIDE 8 MILLIGRAM(S): 4 INJECTION, SOLUTION INTRAMUSCULAR; INTRAVENOUS; SUBCUTANEOUS at 09:24

## 2025-01-30 RX ADMIN — ACETAMINOPHEN, DIPHENHYDRAMINE HCL, PHENYLEPHRINE HCL 3 MILLIGRAM(S): 325; 25; 5 TABLET ORAL at 21:08

## 2025-01-30 RX ADMIN — LIDOCAINE HYDROCHLORIDE 1 PATCH: 30 CREAM TOPICAL at 19:24

## 2025-01-30 RX ADMIN — MEMANTINE HYDROCHLORIDE 10 MILLIGRAM(S): 7 CAPSULE, EXTENDED RELEASE ORAL at 09:25

## 2025-01-30 RX ADMIN — NAFCILLIN INJECTION 200 GRAM(S): 2 POWDER, FOR SOLUTION INTRAMUSCULAR; INTRAMUSCULAR; INTRAVENOUS at 10:57

## 2025-01-30 RX ADMIN — DEXTROMETHORPHAN HBR AND GUAIFENESIN ORAL SOLUTION 10 MILLILITER(S): 10; 100 LIQUID ORAL at 21:08

## 2025-01-30 RX ADMIN — HYDROMORPHONE HYDROCHLORIDE 0.5 MILLIGRAM(S): 4 INJECTION, SOLUTION INTRAMUSCULAR; INTRAVENOUS; SUBCUTANEOUS at 13:23

## 2025-01-30 RX ADMIN — HYDROMORPHONE HYDROCHLORIDE 8 MILLIGRAM(S): 4 INJECTION, SOLUTION INTRAMUSCULAR; INTRAVENOUS; SUBCUTANEOUS at 15:00

## 2025-01-30 RX ADMIN — PANTOPRAZOLE 40 MILLIGRAM(S): 20 TABLET, DELAYED RELEASE ORAL at 05:04

## 2025-01-30 RX ADMIN — HYDROMORPHONE HYDROCHLORIDE 0.5 MILLIGRAM(S): 4 INJECTION, SOLUTION INTRAMUSCULAR; INTRAVENOUS; SUBCUTANEOUS at 12:00

## 2025-01-30 RX ADMIN — Medication 1.25 MILLIGRAM(S): at 16:09

## 2025-01-30 RX ADMIN — HYDROMORPHONE HYDROCHLORIDE 8 MILLIGRAM(S): 4 INJECTION, SOLUTION INTRAMUSCULAR; INTRAVENOUS; SUBCUTANEOUS at 05:05

## 2025-01-30 RX ADMIN — Medication 4 MILLILITER(S): at 09:55

## 2025-01-30 RX ADMIN — AMINOCAPROIC ACID 3.5 GRAM(S): 1000 TABLET ORAL at 05:04

## 2025-01-30 RX ADMIN — HYDROMORPHONE HYDROCHLORIDE 0.5 MILLIGRAM(S): 4 INJECTION, SOLUTION INTRAMUSCULAR; INTRAVENOUS; SUBCUTANEOUS at 22:50

## 2025-01-30 RX ADMIN — ANTISEPTIC SURGICAL SCRUB 1 APPLICATION(S): 0.04 SOLUTION TOPICAL at 05:10

## 2025-01-30 RX ADMIN — LIDOCAINE HYDROCHLORIDE 1 PATCH: 30 CREAM TOPICAL at 11:45

## 2025-01-30 RX ADMIN — HYDROMORPHONE HYDROCHLORIDE 8 MILLIGRAM(S): 4 INJECTION, SOLUTION INTRAMUSCULAR; INTRAVENOUS; SUBCUTANEOUS at 15:23

## 2025-01-30 RX ADMIN — NAFCILLIN INJECTION 200 GRAM(S): 2 POWDER, FOR SOLUTION INTRAMUSCULAR; INTRAMUSCULAR; INTRAVENOUS at 17:56

## 2025-01-30 RX ADMIN — NAFCILLIN INJECTION 200 GRAM(S): 2 POWDER, FOR SOLUTION INTRAMUSCULAR; INTRAMUSCULAR; INTRAVENOUS at 21:17

## 2025-01-30 RX ADMIN — NAFCILLIN INJECTION 200 GRAM(S): 2 POWDER, FOR SOLUTION INTRAMUSCULAR; INTRAMUSCULAR; INTRAVENOUS at 02:54

## 2025-01-30 RX ADMIN — HYDROMORPHONE HYDROCHLORIDE 8 MILLIGRAM(S): 4 INJECTION, SOLUTION INTRAMUSCULAR; INTRAVENOUS; SUBCUTANEOUS at 22:52

## 2025-01-30 RX ADMIN — HYDROMORPHONE HYDROCHLORIDE 8 MILLIGRAM(S): 4 INJECTION, SOLUTION INTRAMUSCULAR; INTRAVENOUS; SUBCUTANEOUS at 18:43

## 2025-01-30 RX ADMIN — Medication 100 MILLIGRAM(S): at 21:08

## 2025-01-30 RX ADMIN — MEMANTINE HYDROCHLORIDE 5 MILLIGRAM(S): 7 CAPSULE, EXTENDED RELEASE ORAL at 21:09

## 2025-01-30 RX ADMIN — Medication 4 MILLILITER(S): at 21:32

## 2025-01-30 RX ADMIN — AMINOCAPROIC ACID 3.5 GRAM(S): 1000 TABLET ORAL at 17:56

## 2025-01-30 RX ADMIN — ANTISEPTIC SURGICAL SCRUB 1 APPLICATION(S): 0.04 SOLUTION TOPICAL at 12:00

## 2025-01-30 RX ADMIN — OCTREOTIDE ACETATE 100 MICROGRAM(S): 1000 INJECTION INTRAVENOUS; SUBCUTANEOUS at 17:55

## 2025-01-30 RX ADMIN — HYDROMORPHONE HYDROCHLORIDE 8 MILLIGRAM(S): 4 INJECTION, SOLUTION INTRAMUSCULAR; INTRAVENOUS; SUBCUTANEOUS at 10:23

## 2025-01-30 RX ADMIN — PANTOPRAZOLE 40 MILLIGRAM(S): 20 TABLET, DELAYED RELEASE ORAL at 17:55

## 2025-01-30 RX ADMIN — Medication 1 APPLICATION(S): at 06:10

## 2025-01-30 RX ADMIN — AMINOCAPROIC ACID 3.5 GRAM(S): 1000 TABLET ORAL at 22:53

## 2025-01-30 RX ADMIN — ONDANSETRON 4 MILLIGRAM(S): 4 TABLET, ORALLY DISINTEGRATING ORAL at 21:11

## 2025-01-30 RX ADMIN — OLANZAPINE 2.5 MILLIGRAM(S): 10 TABLET, FILM COATED ORAL at 21:09

## 2025-01-30 RX ADMIN — HYDROMORPHONE HYDROCHLORIDE 0.5 MILLIGRAM(S): 4 INJECTION, SOLUTION INTRAMUSCULAR; INTRAVENOUS; SUBCUTANEOUS at 22:09

## 2025-01-30 RX ADMIN — NAFCILLIN INJECTION 200 GRAM(S): 2 POWDER, FOR SOLUTION INTRAMUSCULAR; INTRAMUSCULAR; INTRAVENOUS at 05:04

## 2025-01-30 RX ADMIN — HYDROMORPHONE HYDROCHLORIDE 0.5 MILLIGRAM(S): 4 INJECTION, SOLUTION INTRAMUSCULAR; INTRAVENOUS; SUBCUTANEOUS at 18:07

## 2025-01-30 RX ADMIN — LIDOCAINE HYDROCHLORIDE 1 PATCH: 30 CREAM TOPICAL at 23:00

## 2025-01-30 RX ADMIN — AMINOCAPROIC ACID 3.5 GRAM(S): 1000 TABLET ORAL at 11:46

## 2025-01-30 RX ADMIN — OCTREOTIDE ACETATE 100 MICROGRAM(S): 1000 INJECTION INTRAVENOUS; SUBCUTANEOUS at 05:03

## 2025-01-30 RX ADMIN — HYDROMORPHONE HYDROCHLORIDE 0.5 MILLIGRAM(S): 4 INJECTION, SOLUTION INTRAMUSCULAR; INTRAVENOUS; SUBCUTANEOUS at 16:59

## 2025-01-30 RX ADMIN — Medication 2: at 11:45

## 2025-01-30 RX ADMIN — HYDROMORPHONE HYDROCHLORIDE 0.5 MILLIGRAM(S): 4 INJECTION, SOLUTION INTRAMUSCULAR; INTRAVENOUS; SUBCUTANEOUS at 21:08

## 2025-01-30 RX ADMIN — Medication 4 MILLILITER(S): at 16:10

## 2025-01-30 NOTE — PROGRESS NOTE ADULT - SUBJECTIVE AND OBJECTIVE BOX
[facility]  INFECTIOUS DISEASES at [place] / [place] / [place]  =======================================================                              Salazar Toro MD                              Professor Emeritus:  Dr Warner Mcintosh MD            Diplomates American Board of Internal Medicine & Infectious Diseases                                   Tel  [phone] Fax [phone]                                  Hospital Consult line:  [phone]  =======================================================      [place] [id number]    Follow up: MSSA bacteremia    No fevers   Diarrhea improved       Allergies:  pertuzumab (Other (Severe))  Perjeta (Other (Severe))        REVIEW OF SYSTEMS:  CONSTITUTIONAL:  No Fever or chills  HEENT:   No diplopia or blurred vision.  No earache, sore throat or runny nose.  CARDIOVASCULAR:  No Chest Pain  RESPIRATORY:  No cough, shortness of breath  GASTROINTESTINAL:  No nausea, vomiting + diarrhea.  GENITOURINARY:  No dysuria, frequency or urgency. No Blood in urine  MUSCULOSKELETAL:  no joint aches, no muscle pain  SKIN:  No change in skin, hair or nails.  NEUROLOGIC:  No Headaches      Physical Exam:  GEN: NAD  HEENT: normocephalic and atraumatic.    NECK: Supple.   LUNGS: CTA B/L.  HEART: RRR  ABDOMEN: Soft, NT, ND.  +BS.    : No CVA tenderness  EXTREMITIES: Without  edema.  MSK: No joint swelling  NEUROLOGIC: No Focal Deficits   SKIN: No rash      Vitals:  T(F): 97.8 (30 Jan 2025 10:14), Max: 98.1 (29 Jan 2025 17:19)  HR: 86 (30 Jan 2025 10:14)  BP: 111/71 (30 Jan 2025 10:14)  RR: 18 (30 Jan 2025 10:14)  SpO2: 94% (30 Jan 2025 10:14) (93% - 97%)  temp max in last 48H T(F): , Max: 98.4 (01-29-25 @ 00:20)      Current Antibiotics:  nafcillin  IVPB 2 Gram(s) IV Intermittent every 4 hours    Other medications:  acetylcysteine 10%  Inhalation 4 milliLiter(s) Inhalation every 6 hours  aminocaproic acid Tablet 3.5 Gram(s) Oral every 6 hours  chlorhexidine 2% Cloths 1 Application(s) Topical daily  chlorhexidine 2% Cloths 1 Application(s) Topical <User Schedule>  dextrose 5%. 1000 milliLiter(s) IV Continuous <Continuous>  dextrose 5%. 1000 milliLiter(s) IV Continuous <Continuous>  dextrose 50% Injectable 25 Gram(s) IV Push once  dextrose 50% Injectable 25 Gram(s) IV Push once  dextrose 50% Injectable 12.5 Gram(s) IV Push once  FLUoxetine 80 milliGRAM(s) Oral at bedtime  glucagon  Injectable 1 milliGRAM(s) IntraMuscular once  hydrocortisone 1% Cream 1 Application(s) Topical daily  hydrocortisone 2.5% Rectal Cream 1 Application(s) Rectal two times a day  insulin lispro (ADMELOG) corrective regimen sliding scale   SubCutaneous three times a day before meals  levalbuterol Inhalation 1.25 milliGRAM(s) Inhalation every 6 hours  lidocaine   4% Patch 1 Patch Transdermal daily  memantine 5 milliGRAM(s) Oral at bedtime  memantine 10 milliGRAM(s) Oral with breakfast  methylphenidate 20 milliGRAM(s) Oral <User Schedule>  octreotide  Injectable 100 MICROGram(s) SubCutaneous two times a day  OLANZapine 2.5 milliGRAM(s) Oral at bedtime  pantoprazole  Injectable 40 milliGRAM(s) IV Push every 12 hours  predniSONE   Tablet 10 milliGRAM(s) Oral daily                            7.5    4.86  )-----------( 147      ( 29 Jan 2025 06:15 )             25.0     01-29    138  |  103  |  7.0[L]  ----------------------------<  107[H]  3.0[L]   |  26.0  |  0.55    Ca    8.0[L]      29 Jan 2025 06:15      RECENT CULTURES:  01-17 @ 13:02 .Blood BLOOD     No growth at 5 days    01-17 @ 12:58 .Blood BLOOD     No growth at 5 days      WBC Count: 4.86 K/uL (01-29-25 @ 06:15)  WBC Count: 3.94 K/uL (01-28-25 @ 05:55)  WBC Count: 6.67 K/uL (01-27-25 @ 06:18)  WBC Count: 4.73 K/uL (01-26-25 @ 05:56)    Creatinine: 0.55 mg/dL (01-29-25 @ 06:15)  Creatinine: 0.58 mg/dL (01-28-25 @ 05:55)  Creatinine: 0.53 mg/dL (01-27-25 @ 06:18)  Creatinine: 0.55 mg/dL (01-26-25 @ 05:56)    Procalcitonin: 0.15 ng/mL (01-21-25 @ 06:58)     SARS-CoV-2: NotDetec (01-05-25 @ 10:54)  SARS-CoV-2 Result: NotDetec (01-02-25 @ 19:20)            < from: MARYANN W or WO Ultrasound Enhancing Agent (01.27.25 @ 13:36) >  CONCLUSIONS:      1. Left ventricular systolic function is normal with an ejection fraction visually estimated at 55 to 60 %.   2. Normal right ventricular cavity size and normalright ventricular systolic function.   3. Mild tricuspid regurgitation.   4. Agitated saline injection was negative for intracardiac shunt.   5. No evidence of left atrial appendage thrombus.   6. No echocardiographic evidence of vegetations.    < end of copied text >        < from: TTE Limited W or WO Ultrasound Enhancing Agent (01.06.25 @ 12:55) >  CONCLUSIONS:      1. Technically difficult image quality.   2. Left ventricular systolic function is normal with an ejection fraction visually estimated at 65 to 70 %.   3. Normal right ventricular cavity size, with normal wall thickness, and normal right ventricular systolic function.   4. Compared to the transthoracic echocardiogram performed on 7/21/2024, there have been no significant interval changes.    < end of copied text >

## 2025-01-30 NOTE — PROGRESS NOTE ADULT - TIME BILLING
Pain Management - chart review, patient interview
This includes chart review, patient assessment, discussion with Clinical pharmacy Antibiotic dosing and management with clinical pharmacy.
This includes chart review, patient assessment, discussion with Jessica NP and Clinical pharmacy Antibiotic dosing and management with clinical pharmacy.
Time spent reviewing the chart documentation, reviewing labs and imaging studies, evaluating the patient, discussing the plan of care with the consultants & medical team, and documenting.
This includes chart review, patient assessment, discussion with Cardiology. Jessica NP and Clinical pharmacy Antibiotic dosing and management with clinical pharmacy.
This includes chart review, patient assessment, discussion with Clinical pharmacy, Dr Burnette and Oncology Dr Chapa . Antibiotic dosing and management with clinical pharmacy.
This includes chart review, patient assessment, discussion with Dr Kennedy, Clinical pharmacy Antibiotic dosing and management with clinical pharmacy.
This includes chart review, patient assessment, discussion with Jumana SEQUEIRA, Dr Bryant. Antibiotic dosing and management with clinical pharmacy.
Time spent reviewing the chart documentation, reviewing labs and imaging studies, evaluating the patient, discussing the plan of care with the consultants & medical team, and documenting.
chart review, patient evaluation, documentation, coordination of care and discussion with nurses, consultants, ACP and patient's family.
reviewing the chart documentation, reviewing labs and imaging studies, evaluating the patient, discussing the plan of care with the consultants & medical team, and documenting.
reviewing the chart documentation, reviewing labs and imaging studies, evaluating the patient, discussing the plan of care with the consultants & medical team, and documenting.
Pain Management - chart review, patient interview
This includes chart review, patient assessment, discussion with Clinical pharmacy Antibiotic dosing and management with clinical pharmacy.
This includes chart review, patient assessment, discussion with Clinical pharmacy and Dr Acevedo. Antibiotic dosing and management with clinical pharmacy.
This includes chart review, patient assessment, discussion with Dr Kennedy, Clinical pharmacy Antibiotic dosing and management with clinical pharmacy.
This includes chart review, patient assessment, discussion with Dr Kennedy, Clinical pharmacy Antibiotic dosing and management with clinical pharmacy.
This includes chart review, patient assessment, discussion with Jumana SEQUEIRA, Dr Bryant. Antibiotic dosing and management with clinical pharmacy.
Time spent reviewing patient's chart, labwork, orders, documenting care, coordinating care with consultants, and discussing patient's care with family members.
Time spent reviewing the chart documentation, reviewing labs and imaging studies, evaluating the patient, discussing the plan of care with the consultants & medical team, and documenting.
Time spent reviewing the chart documentation, reviewing labs and imaging studies, evaluating the patient, discussing the plan of care with the medical team and/or all necessary consultants, and documenting.
chart review, patient evaluation, documentation, coordination of care and discussion with nurses, consultants, ACP, social work, case management and patient's family.
This includes chart review, patient assessment, discussion with Jessica NP and Clinical pharmacy Antibiotic dosing and management with clinical pharmacy.
reviewing the chart documentation, reviewing labs and imaging studies, evaluating the patient, discussing the plan of care with the consultants & medical team, and documenting.
Time spent reviewing the chart documentation, reviewing labs and imaging studies, evaluating the patient, discussing the plan of care with the consultants & medical team, and documenting.
chart review, patient evaluation, documentation, coordination of care and discussion with nurses, consultants, ACP, social work, case management and patient's family.
chart review, patient evaluation, documentation, coordination of care and discussion with nurses, consultants, ACP, social work, case management.
greater than 50% of time spent reviewing labs, notes, orders and radiographs, coordinating care  discussed with nursing, primary team, consultants
reviewing the chart documentation, reviewing labs and imaging studies, evaluating the patient, discussing the plan of care with the consultants & medical team, and documenting.
reviewing the chart documentation, reviewing labs and imaging studies, evaluating the patient, discussing the plan of care with the consultants & medical team, and documenting.
Pain Management - chart review, patient interview
This includes chart review, patient assessment, discussion with Cardiology. Jessica NP and Clinical pharmacy Antibiotic dosing and management with clinical pharmacy.
This includes chart review, patient assessment, discussion with Clinical pharmacy Antibiotic dosing and management with clinical pharmacy.
This includes chart review, patient assessment, discussion with Dr Kennedy, Clinical pharmacy Antibiotic dosing and management with clinical pharmacy.
This includes chart review, patient assessment, discussion with Dr Kennedy, Clinical pharmacy Antibiotic dosing and management with clinical pharmacy.
This includes chart review, patient assessment, discussion with Jessica NP and Clinical pharmacy Antibiotic dosing and management with clinical pharmacy.
chart review, patient evaluation, documentation, coordination of care and discussion with nurses, consultants, ACP, social work, case management.
Pain Management - chart review, patient interview

## 2025-01-30 NOTE — PROGRESS NOTE ADULT - ASSESSMENT
38 yo F with h/o metastatic breast cancer [place] Neg, HER-2 + on chemotherapy (last dose 12/26/24) (mets to lung, liver, spleen, spine, bone and brain).     Recommendations:      - Dilaudid 0.5mg IVP q3h prn severe BTP ONLY. Hold for sedation or unstable vitals.   - lidocaine   Patch 12 hours on, 12 hours off. Max 3 patches on at one time   - Continue Methadone 5mg po bid at 0600 and 1400   - Continue Methadone 10mg po qHS   - Continue home Lyrica 200mg po q8h     -  Dilaudid PO 4/8mg q4h PRN mod/severe pain

## 2025-01-30 NOTE — CHART NOTE - NSCHARTNOTEFT_GEN_A_CORE
Source: Patient [ x]  Family [ ]   other [ ]    Current Diet:   Diet, Regular (01-09-25 @ 11:18)    Patient reports [ ] nausea  [ ] vomiting [ ] diarrhea [ ] constipation  [ ]chewing problems [ ] swallowing issues  [ ] other: denies    PO intake:  < 50% [x ]   50-75%  [x ]   %  [ ]  other :    Source for PO intake [ x] Patient [ ] family [x ] chart [ ] staff [ ] other    Current Weight:   (1/9) 149.2 lbs     % Weight Change: No recent weight documented    Pertinent Medications: MEDICATIONS  (STANDING):  dextrose 5%. 1000 milliLiter(s) (50 mL/Hr) IV Continuous <Continuous>  glucagon  Injectable 1 milliGRAM(s) IntraMuscular once  insulin lispro (ADMELOG) corrective regimen sliding scale   SubCutaneous three times a day before meals  pantoprazole  Injectable 40 milliGRAM(s) IV Push every 12 hours  predniSONE   Tablet 10 milliGRAM(s) Oral daily    Pertinent Labs: CBC Full  -  ( 30 Jan 2025 11:24 )  WBC Count : 6.83 K/uL  RBC Count : 2.68 M/uL  Hemoglobin : 7.9 g/dL  Hematocrit : 26.9 %  Platelet Count - Automated : 144 K/uL  Mean Cell Volume : 100.4 fl  Mean Cell Hemoglobin : 29.5 pg  Mean Cell Hemoglobin Concentration : 29.4 g/dL  01-30 Na138 mmol/L Glu 192 mg/dL[H] K+ 3.9 mmol/L Cr  0.62 mg/dL BUN 8.7 mg/dL Phos n/a   Alb n/a   PAB n/a       Skin: wound R elbow  Edema: 1+ generalized edema     Nutrition focused physical exam conducted - found signs of malnutrition [x ]absent [ ]present    Subcutaneous fat loss: [ ] Orbital fat pads region, [ ]Buccal fat region, [ ]Triceps region,  [ ]Ribs region    Muscle wasting: [ ]Temples region, [ ]Clavicle region, [ ]Shoulder region, [ ]Scapula region, [ ]Interosseous region,  [ ]thigh region, [ ]Calf region    Estimated Needs:   [ x] no change since previous assessment  [ ] recalculated:     Current Nutrition Diagnosis: Pt remains at nutrition risk secondary to increased nutrient needs (protein-energy) related to increased physiological demand to maintain LBM as evidenced by metastatic breast CA  (mets to lung, liver, spleen, spine, bone and brain) on chemo, flu+. Pt reports fair-good appetite/PO intake, lunch tray with <25% eaten at time of interview, poor PO intake per documentation. Pt requests education regarding iron content of foods, written literature and verbal education given with RD contact information provided for additional questions in house. Pt is eager for d/c, states that she has [employer] protein shakes at home, agreeable to Ensure (chocolate) should d/c get held. Last documented BM 1/29.      Recommendations:   1) Add Ensure HP/Enlive TID   2) Consider appetite stimulant   3) Encourage HBV protein sources   4) Monitor weights daily for trend/accuracy     Monitoring and Evaluation:   [ x] PO intake [x] Tolerance to diet prescription [X] Weights  [X] Follow up per protocol [X] Labs:

## 2025-01-30 NOTE — PROGRESS NOTE ADULT - ASSESSMENT
39y  Female with h/o metastatic breast cancer ER/NE Neg, HER-2 + on chemotherapy (last dose 12/26/24) (mets to lung, liver, spleen, spine, bone and brain), gastritis w/ intermittent episodes of dark stool (follows w/ Dr. Rosado GI and is on PPI and octreotide daily). Patient presented 1/5 with c/o worsening SOB.  She was seen in ED 1/2/25 for for weakness and SOB at which time she was found to have Hb of 5 requiring PRBC transfusion and positive for Flu A and started on tamiflu and was discharged from the ED on 1/3/25.  Her respiratory status has worsened since then w/ productive cough associated with body aches and chills.  Denies sick contacts but has had many frequent hospital visits. Patient has been afebrile, no leukocytosis. Was placed on BIPAP for Worsening respiratory status. continued on Tamiflu. Vancomycn and Zosyn was added. Blood cultures with MSSA.     Metastatic breast cancer   - last dose of trastuzumab was on 12/26/24.  - Hold Tactinib for now until she sees Dr Chapa after discharge   - All treatment on hold at the moment.  -Now on oral pain meds     Cytopenias   - secondary to malignancy and acute illness and now GIB  - S/P multiple transfusions  - GI evaluated for large bloody bowel movement  Suspect rectal bleeding is due to diverticular bleed which are self-limiting - If patient becomes hemodynamically unstable, requiring multiple transfusions,   - CT A/P No evidence of active intraluminal extravasation of contrast.   - GI f/u noted.  no plans for scope.  Previously scoped in Nov. 2024. internal hemorrhoids, gastric erosions, no active bleeding site   - c/w home sub q octreotide and IV protonix  - Has been on  Amicar 4 grams Q 6 H for bleeding with good results  will titrate according to HGB and bleeding. goal of minimal effective dose to prevent bleeding.   - Decreased Amicar to 3.5 grams Q6H 1/27  - Last Hgb 7.5   -Transfuse if hgb<7.0.      Respiratory failure   - multifocal pneumonia and flu+  - S/P oseltamivir    - Management as per primary team.  - TTE 1/6 with no veg  - Cardiology following- . S/P MARYANN as above    MSSA Bacteremia  - cultures persistently positive. pt afebrile.   -- S/P Port removal 1/10/25-  PICC line prior to d/c for outpt chemo   - She is concerned about access. Will place once closer to discharge.  + Noro virus and giardia lamblia  -- For Giardia, Completed 5 days of Albendazole   - ID following- - Since no veg on MARYANN and port removed, will plan on antibiotics till 2/6/25 (4 weeks) Continue Nafcillin 2gm IV [id number]  --S/P left; basilic vein PICC placement at bedside by PA     Anticipating discharge home     Will follow

## 2025-01-30 NOTE — PROGRESS NOTE ADULT - SUBJECTIVE AND OBJECTIVE BOX
[facility] Division of Hospital Medicine    INTERVAL HISTORY:  No events overnight,     Patient seen at bedside, in NAD, no new complaints today. Patient denies chest pain, SOB, abd pain, N/V, fever, chills, dysuria or any other complaints.     MEDICATIONS  (STANDING):  acetylcysteine 10%  Inhalation 4 milliLiter(s) Inhalation every 6 hours  aminocaproic acid Tablet 3.5 Gram(s) Oral every 6 hours  chlorhexidine 2% Cloths 1 Application(s) Topical daily  chlorhexidine 2% Cloths 1 Application(s) Topical <User Schedule>  dextrose 5%. 1000 milliLiter(s) (50 mL/Hr) IV Continuous <Continuous>  dextrose 5%. 1000 milliLiter(s) (100 mL/Hr) IV Continuous <Continuous>  dextrose 50% Injectable 25 Gram(s) IV Push once  dextrose 50% Injectable 25 Gram(s) IV Push once  dextrose 50% Injectable 12.5 Gram(s) IV Push once  FLUoxetine 80 milliGRAM(s) Oral at bedtime  glucagon  Injectable 1 milliGRAM(s) IntraMuscular once  hydrocortisone 1% Cream 1 Application(s) Topical daily  hydrocortisone 2.5% Rectal Cream 1 Application(s) Rectal two times a day  insulin lispro (ADMELOG) corrective regimen sliding scale   SubCutaneous three times a day before meals  levalbuterol Inhalation 1.25 milliGRAM(s) Inhalation every 6 hours  lidocaine   4% Patch 1 Patch Transdermal daily  memantine 5 milliGRAM(s) Oral at bedtime  memantine 10 milliGRAM(s) Oral with breakfast  methylphenidate 20 milliGRAM(s) Oral <User Schedule>  nafcillin  IVPB 2 Gram(s) IV Intermittent every 4 hours  octreotide  Injectable 100 MICROGram(s) SubCutaneous two times a day  OLANZapine 2.5 milliGRAM(s) Oral at bedtime  pantoprazole  Injectable 40 milliGRAM(s) IV Push every 12 hours  predniSONE   Tablet 10 milliGRAM(s) Oral daily    MEDICATIONS  (PRN):  acetaminophen     Tablet .. 650 milliGRAM(s) Oral every 6 hours PRN Temp greater or equal to 38C (100.4F), Mild Pain (1 - 3)  aluminum hydroxide/magnesium hydroxide/simethicone Suspension 30 milliLiter(s) Oral every 4 hours PRN Dyspepsia  benzonatate 100 milliGRAM(s) Oral every 8 hours PRN Cough  dextrose Oral Gel 15 Gram(s) Oral once PRN Blood Glucose LESS THAN 70 milliGRAM(s)/deciliter  guaifenesin/dextromethorphan Oral Liquid 10 milliLiter(s) Oral every 4 hours PRN Cough  HYDROmorphone   Tablet 4 milliGRAM(s) Oral every 4 hours PRN Moderate Pain (4 - 6)  HYDROmorphone   Tablet 8 milliGRAM(s) Oral every 4 hours PRN Severe Pain (7 - 10)  HYDROmorphone  Injectable 0.5 milliGRAM(s) IV Push every 3 hours PRN Severe Pain (7 - 10)  melatonin 3 milliGRAM(s) Oral at bedtime PRN Insomnia  naloxone Injectable 0.1 milliGRAM(s) IV Push every 3 minutes PRN For ANY of the following changes in patient status:  A. RR LESS THAN 10 breaths per minute, B. Oxygen saturation LESS THAN 90%, C. Sedation score of 6  ondansetron Injectable 4 milliGRAM(s) IV Push every 8 hours PRN Nausea and/or Vomiting  sodium chloride 0.65% Nasal 1 Spray(s) Both Nostrils five times a day PRN Nasal Congestion  sodium chloride 0.9% lock flush 10 milliLiter(s) IV Push every 1 hour PRN Pre/post blood products, medications, blood draw, and to maintain line patency        I&O's Summary    29 Jan 2025 07:01  -  30 Jan 2025 07:00  --------------------------------------------------------  IN: 1375 mL / OUT: 0 mL / NET: 1375 mL        PHYSICAL EXAM:  Vital Signs Last 24 Hrs  T(C): 36.6 (30 Jan 2025 10:14), Max: 36.7 (29 Jan 2025 17:19)  T(F): 97.8 (30 Jan 2025 10:14), Max: 98.1 (29 Jan 2025 17:19)  HR: 96 (30 Jan 2025 11:59) (68 - 100)  BP: 108/68 (30 Jan 2025 11:59) (90/54 - 111/71)  BP(mean): --  RR: 18 (30 Jan 2025 10:14) (18 - 18)  SpO2: 94% (30 Jan 2025 10:14) (93% - 97%)    Parameters below as of 30 Jan 2025 09:55  Patient On (Oxygen Delivery Method): nasal cannula w/ humidification, 2 lpm    CONSTITUTIONAL: No apparent distress  HEENT: Normocephalic, Atraumatic.   RESPIRATORY:  lungs are clear to auscultation bilaterally  CARDIOVASCULAR: Regular rate and rhythm, 1+ lower extremity edema  ABDOMEN: Soft, non-distended, nontender to palpation, +BS  PSYCH: thoughts linear, affect appropriate  NEUROLOGY: Alert, Oriented x3  LABS:                        7.9    6.83  )-----------( 144      ( 30 Jan 2025 11:24 )             26.9     01-30    138  |  101  |  8.7  ----------------------------<  192[H]  3.9   |  27.0  |  0.62    Ca    8.4      30 Jan 2025 11:24  Mg     1.7     01-30            Urinalysis Basic - ( 30 Jan 2025 11:24 )    Color: x / Appearance: x / SG: x / pH: x  Gluc: 192 mg/dL / Ketone: x  / Bili: x / Urobili: x   Blood: x / Protein: x / Nitrite: x   Leuk Esterase: x / RBC: x / WBC x   Sq Epi: x / Non Sq Epi: x / Bacteria: x        CAPILLARY BLOOD GLUCOSE      POCT Blood Glucose.: 235 mg/dL (30 Jan 2025 11:42)  POCT Blood Glucose.: 122 mg/dL (30 Jan 2025 07:49)  POCT Blood Glucose.: 184 mg/dL (29 Jan 2025 21:47)  POCT Blood Glucose.: 100 mg/dL (29 Jan 2025 17:16)        RADIOLOGY & ADDITIONAL TESTS:  Results Reviewed

## 2025-01-30 NOTE — PROGRESS NOTE ADULT - SUBJECTIVE AND OBJECTIVE BOX
Interval Hx:  Patient seen during rounds  Patient reports pain to be controlled on current medications  Patient denies sedation with medications     Analgesic Dosing for past 24 hours reviewed as below:    diphenhydrAMINE   25 milliGRAM(s) Oral (01-29-25 @ 20:22)    FLUoxetine   80 milliGRAM(s) Oral (01-29-25 @ 21:49)    HYDROmorphone   Tablet   8 milliGRAM(s) Oral (01-30-25 @ 09:24)   8 milliGRAM(s) Oral (01-30-25 @ 05:05)   8 milliGRAM(s) Oral (01-29-25 @ 21:49)   8 milliGRAM(s) Oral (01-29-25 @ 17:37)   8 milliGRAM(s) Oral (01-29-25 @ 13:21)    HYDROmorphone  Injectable   0.5 milliGRAM(s) IV Push (01-30-25 @ 12:00)   0.5 milliGRAM(s) IV Push (01-29-25 @ 23:00)   0.5 milliGRAM(s) IV Push (01-29-25 @ 18:46)   0.5 milliGRAM(s) IV Push (01-29-25 @ 15:23)    melatonin   3 milliGRAM(s) Oral (01-29-25 @ 21:49)    memantine   5 milliGRAM(s) Oral (01-29-25 @ 21:48)    memantine   10 milliGRAM(s) Oral (01-30-25 @ 09:25)    methadone    Tablet   10 milliGRAM(s) Oral (01-29-25 @ 21:49)    methylphenidate   20 milliGRAM(s) Oral (01-30-25 @ 09:24)    OLANZapine   2.5 milliGRAM(s) Oral (01-29-25 @ 21:48)    pregabalin   200 milliGRAM(s) Oral (01-29-25 @ 21:48)   200 milliGRAM(s) Oral (01-29-25 @ 13:20)          T(C): 36.6 (01-30-25 @ 10:14), Max: 36.7 (01-29-25 @ 17:19)  HR: 96 (01-30-25 @ 11:59) (68 - 100)  BP: 108/68 (01-30-25 @ 11:59) (90/54 - 111/71)  RR: 18 (01-30-25 @ 10:14) (18 - 18)  SpO2: 94% (01-30-25 @ 10:14) (93% - 97%)      01-29-25 @ 07:01  -  01-30-25 @ 07:00  --------------------------------------------------------  IN: 1375 mL / OUT: 0 mL / NET: 1375 mL        acetaminophen     Tablet .. 650 milliGRAM(s) Oral every 6 hours PRN  acetylcysteine 10%  Inhalation 4 milliLiter(s) Inhalation every 6 hours  aluminum hydroxide/magnesium hydroxide/simethicone Suspension 30 milliLiter(s) Oral every 4 hours PRN  aminocaproic acid Tablet 3.5 Gram(s) Oral every 6 hours  benzonatate 100 milliGRAM(s) Oral every 8 hours PRN  chlorhexidine 2% Cloths 1 Application(s) Topical daily  chlorhexidine 2% Cloths 1 Application(s) Topical <User Schedule>  dextrose 5%. 1000 milliLiter(s) IV Continuous <Continuous>  dextrose 5%. 1000 milliLiter(s) IV Continuous <Continuous>  dextrose 50% Injectable 25 Gram(s) IV Push once  dextrose 50% Injectable 12.5 Gram(s) IV Push once  dextrose 50% Injectable 25 Gram(s) IV Push once  dextrose Oral Gel 15 Gram(s) Oral once PRN  FLUoxetine 80 milliGRAM(s) Oral at bedtime  glucagon  Injectable 1 milliGRAM(s) IntraMuscular once  guaifenesin/dextromethorphan Oral Liquid 10 milliLiter(s) Oral every 4 hours PRN  hydrocortisone 1% Cream 1 Application(s) Topical daily  hydrocortisone 2.5% Rectal Cream 1 Application(s) Rectal two times a day  HYDROmorphone   Tablet 4 milliGRAM(s) Oral every 4 hours PRN  HYDROmorphone   Tablet 8 milliGRAM(s) Oral every 4 hours PRN  HYDROmorphone  Injectable 0.5 milliGRAM(s) IV Push every 3 hours PRN  insulin lispro (ADMELOG) corrective regimen sliding scale   SubCutaneous three times a day before meals  levalbuterol Inhalation 1.25 milliGRAM(s) Inhalation every 6 hours  lidocaine   4% Patch 1 Patch Transdermal daily  melatonin 3 milliGRAM(s) Oral at bedtime PRN  memantine 5 milliGRAM(s) Oral at bedtime  memantine 10 milliGRAM(s) Oral with breakfast  methylphenidate 20 milliGRAM(s) Oral <User Schedule>  nafcillin  IVPB 2 Gram(s) IV Intermittent every 4 hours  naloxone Injectable 0.1 milliGRAM(s) IV Push every 3 minutes PRN  octreotide  Injectable 100 MICROGram(s) SubCutaneous two times a day  OLANZapine 2.5 milliGRAM(s) Oral at bedtime  ondansetron Injectable 4 milliGRAM(s) IV Push every 8 hours PRN  pantoprazole  Injectable 40 milliGRAM(s) IV Push every 12 hours  predniSONE   Tablet 10 milliGRAM(s) Oral daily  sodium chloride 0.65% Nasal 1 Spray(s) Both Nostrils five times a day PRN  sodium chloride 0.9% lock flush 10 milliLiter(s) IV Push every 1 hour PRN                          7.9    6.83  )-----------( 144      ( 30 Jan 2025 11:24 )             26.9     01-30    138  |  101  |  8.7  ----------------------------<  192[H]  3.9   |  27.0  |  0.62    Ca    8.4      30 Jan 2025 11:24  Mg     1.7     01-30        Urinalysis Basic - ( 30 Jan 2025 11:24 )    Color: x / Appearance: x / SG: x / pH: x  Gluc: 192 mg/dL / Ketone: x  / Bili: x / Urobili: x   Blood: x / Protein: x / Nitrite: x   Leuk Esterase: x / RBC: x / WBC x   Sq Epi: x / Non Sq Epi: x / Bacteria: x        Pain Service   [phone]

## 2025-01-30 NOTE — PROGRESS NOTE ADULT - SUBJECTIVE AND OBJECTIVE BOX
39y  Female with h/o metastatic breast cancer ER/SD Neg, HER-2 + on chemotherapy (last dose 12/26/24) (mets to lung, liver, spleen, spine, bone and brain), gastritis w/ intermittent episodes of dark stool (follows w/ Dr. Rosado GI and is on PPI and octreotide daily). Patient presented 1/5 with c/o worsening SOB.  She was seen in ED 1/2/25 for for weakness and SOB at which time she was found to have Hb of 5 requiring PRBC transfusion and positive for Flu A and started on tamiflu and was discharged from the ED on 1/3/25.  Her respiratory status has worsened since then w/ productive cough associated with body aches and chills.  Denies sick contacts but has had many frequent hospital visits. Patient has been afebrile, no leukocytosis. Was placed on BIPAP for Worsening respiratory status. continued on Tamiflu. Vancomycn and Zosyn was added. Blood cultures with MSSA.     PAST MEDICAL & SURGICAL HISTORY:  H/O compression fracture of spine  Anxiety  Metastatic breast cancer  H/O pleural effusion  Pericardial effusion  S/P tonsillectomy  H/O chest tube placement  12/23/21  S/P pericardiocentesis  12/28/21    Allergies  pertuzumab (Other (Severe))  Perjeta (Other (Severe))    MEDICATIONS  (STANDING):  acetylcysteine 10%  Inhalation 4 milliLiter(s) Inhalation every 6 hours  aminocaproic acid Tablet 4 Gram(s) Oral every 6 hours  chlorhexidine 2% Cloths 1 Application(s) Topical daily  dextrose 5%. 1000 milliLiter(s) (50 mL/Hr) IV Continuous <Continuous>  dextrose 5%. 1000 milliLiter(s) (100 mL/Hr) IV Continuous <Continuous>  dextrose 50% Injectable 25 Gram(s) IV Push once  dextrose 50% Injectable 12.5 Gram(s) IV Push once  dextrose 50% Injectable 25 Gram(s) IV Push once  FLUoxetine Solution 80 milliGRAM(s) Oral at bedtime  glucagon  Injectable 1 milliGRAM(s) IntraMuscular once  hydrocortisone 1% Cream 1 Application(s) Topical daily  hydrocortisone 2.5% Rectal Cream 1 Application(s) Rectal two times a day  HYDROmorphone PCA (1 mG/mL) 30 milliLiter(s) PCA Continuous PCA Continuous  insulin lispro (ADMELOG) corrective regimen sliding scale   SubCutaneous three times a day before meals  levalbuterol Inhalation 1.25 milliGRAM(s) Inhalation every 6 hours  memantine 5 milliGRAM(s) Oral at bedtime  memantine 10 milliGRAM(s) Oral with breakfast  methadone    Tablet 10 milliGRAM(s) Oral at bedtime  methadone    Tablet 5 milliGRAM(s) Oral <User Schedule>  methylphenidate 20 milliGRAM(s) Oral <User Schedule>  nafcillin  IVPB 2 Gram(s) IV Intermittent every 4 hours  octreotide  Injectable 100 MICROGram(s) SubCutaneous two times a day  OLANZapine 2.5 milliGRAM(s) Oral at bedtime  pantoprazole  Injectable 40 milliGRAM(s) IV Push every 12 hours  predniSONE   Tablet 20 milliGRAM(s) Oral daily  pregabalin 200 milliGRAM(s) Oral every 8 hours  sodium chloride 0.9% with potassium chloride 20 mEq/L 1000 milliLiter(s) (100 mL/Hr) IV Continuous <Continuous>    MEDICATIONS  (PRN):  acetaminophen     Tablet .. 650 milliGRAM(s) Oral every 6 hours PRN Temp greater or equal to 38C (100.4F), Mild Pain (1 - 3)  aluminum hydroxide/magnesium hydroxide/simethicone Suspension 30 milliLiter(s) Oral every 4 hours PRN Dyspepsia  benzonatate 100 milliGRAM(s) Oral every 8 hours PRN Cough  dextrose Oral Gel 15 Gram(s) Oral once PRN Blood Glucose LESS THAN 70 milliGRAM(s)/deciliter  guaifenesin/dextromethorphan Oral Liquid 10 milliLiter(s) Oral every 4 hours PRN Cough  HYDROmorphone  Injectable 0.5 milliGRAM(s) IV Push every 3 hours PRN Severe Pain (7 - 10)  melatonin 3 milliGRAM(s) Oral at bedtime PRN Insomnia  naloxone Injectable 0.1 milliGRAM(s) IV Push every 3 minutes PRN For ANY of the following changes in patient status:  A. RR LESS THAN 10 breaths per minute, B. Oxygen saturation LESS THAN 90%, C. Sedation score of 6  ondansetron Injectable 4 milliGRAM(s) IV Push every 8 hours PRN Nausea and/or Vomiting    Vital Signs Last 24 Hrs  T(C): 36.4 (30 Jan 2025 04:46), Max: 36.8 (29 Jan 2025 11:00)  T(F): 97.5 (30 Jan 2025 04:46), Max: 98.2 (29 Jan 2025 11:00)  HR: 80 (30 Jan 2025 04:46) (68 - 100)  BP: 101/63 (30 Jan 2025 04:46) (90/54 - 108/68)  BP(mean): --  RR: 18 (30 Jan 2025 04:46) (18 - 18)  SpO2: 96% (30 Jan 2025 04:46) (92% - 97%)    Parameters below as of 30 Jan 2025 04:46  Patient On (Oxygen Delivery Method): nasal cannula  O2 Flow (L/min): 2    PE:  NAD  On O2 NC  bilateral rales  abd soft, nd, nt  a/o x 3      CBC                          7.5    4.86  )-----------( 147      ( 29 Jan 2025 06:15 )             25.0                             7.3    3.94  )-----------( 134      ( 28 Jan 2025 05:55 )             24.7                             8.7    6.67  )-----------( 155      ( 27 Jan 2025 06:18 )             29.0         Chem:       01-29    138  |  103  |  7.0[L]  ----------------------------<  107[H]  3.0[L]   |  26.0  |  0.55    Ca    8.0[L]      29 Jan 2025 06:15        01-29    138  |  103  |  7.0[L]  ----------------------------<  107[H]  3.0[L]   |  26.0  |  0.55    Ca    8.0[L]      29 Jan 2025 06:15      01-28    142  |  105  |  7.5[L]  ----------------------------<  152[H]  3.5   |  26.0  |  0.58    Ca    8.1[L]      28 Jan 2025 05:55        01-27    140  |  104  |  7.6[L]  ----------------------------<  130[H]  3.9   |  25.0  |  0.53    Ca    8.3[L]      27 Jan 2025 06:18        01-21    139  |  105  |  4.5[L]  ----------------------------<  132[H]  3.8   |  24.0  |  0.42[L]    Ca    8.0[L]      21 Jan 2025 06:58    TPro  5.1[L]  /  Alb  2.3[L]  /  TBili  0.5  /  DBili  x   /  AST  19  /  ALT  12  /  AlkPhos  140[H]  01-21 01-20    138  |  103  |  4.9[L]  ----------------------------<  167[H]  3.9   |  25.0  |  0.51    Ca    8.0[L]      20 Jan 2025 07:25    TPro  5.5[L]  /  Alb  2.3[L]  /  TBili  0.6  /  DBili  x   /  AST  19  /  ALT  13  /  AlkPhos  165[H]  01-20 01-17    139  |  104  |  8.7  ----------------------------<  163[H]  2.9[LL]   |  26.0  |  0.70    Ca    7.3[L]      17 Jan 2025 06:45  Phos  4.2     01-17  Mg     1.5     01-17    TPro  5.0[L]  /  Alb  2.1[L]  /  TBili  0.8  /  DBili  0.3  /  AST  17  /  ALT  13  /  AlkPhos  127[H]  01-16        MARYANN performed revealing:   Left Ventricle: Left ventricular systolic function is normal with an ejection fraction visually estimated at 55 to 60%. There are no regional wall motion abnormalities seen. Unable to assess left ventricular diastolic function due to insufficient data.  Right Ventricle: The right ventricular cavity is normal in size and right ventricular systolic function is normal.  Left Atrium: The left atrium is normal in size. There is no evidence of left atrial appendage thrombus.  Right Atrium: The right atrium is normal in size.  Interatrial Septum: Lipomatous interatrial septal hypertrophy present. Agitated saline injection was negative for intracardiac shunt.  Aortic Valve: The aortic valve is tricuspid with normal leaflet excursion. There is no evidence of aortic regurgitation.  Mitral Valve: Structurally normal mitral valve with normal leaflet excursion. There is trace mitral regurgitation. There is normal pulmonary venous flow.  Tricuspid Valve: Structurally normal tricuspid valve with normal leaflet excursion. There is mild tricuspid regurgitation. There is no echocardiographic evidence of pulmonary hypertension. Estimated pulmonary artery systolic pressure is 17 mmHg. IVC was evaluated but not visualized.  Pulmonic Valve: Structurally normal pulmonic valve with normal leaflet excursion. There is no evidence of pulmonic regurgitation.  Pulmonary Artery: The main pulmonary artery is normal in size, origin, and position.  Aorta: The aortic root appears normal in size.  Pericardium: No pericardial effusion seen.

## 2025-01-30 NOTE — PROGRESS NOTE ADULT - ASSESSMENT
39y  Female with h/o metastatic breast cancer ER/HI Neg, HER-2 + on chemotherapy (last dose 12/26/24) (mets to lung, liver, spleen, spine, bone and brain), gastritis w/ intermittent episodes of dark stool (follows w/ Dr. Alonzo BISHOP and is on PPI and octreotide daily). Patient presented 1/5 with c/o worsening SOB.  She was seen in ED 1/2/25 for for weakness and SOB at which time she was found to have Hb of 5 requiring PRBC transfusion and positive for Flu A and started on tamiflu and was discharged from the ED on 1/3/25.  Her respiratory status has worsened since then w/ productive cough associated with body aches and chills.  Denies sick contacts but has had many frequent hospital visits. Patient has been afebrile, no leukocytosis. Was placed on BIPAP for Worsening respiratory status. continued on Tamiflu. Vancomycn and Zosyn was added. Blood cultures with MSSA. ID input requested.       MSSA bacteremia   Staphylococcus aureus PNA   Influenza A   metastatic breast cancer ER/HI Neg, HER-2 + on chemotherapy   Port in place s/p explant 1/10/25  Stool PCR + Norovirus and Giardia       - Blood cultures 1/5, 1/7, 1/9 reporting MSSA    - Repeat blood cultures 1/10,  1/11/25, 1/17/25 no growth   - Sputum Cx 1/6 Staphylococcus aureus (MSSA)  - Urine Cx 1/5 reporting 10k - 49k Escherichia coli  of ? significance since UA not concerning for UTI   - Stool PCR + Norovirus and Giardia   - RVP/COVID 19 PCR + Influenza A  - S/P Port removal 1/10/25  - CTA Chest reporting No acute pulmonary embolism. Wide spread patchy airspace opacities bilaterally, increased since the prior exam.  - US RUQ reporting No evidence of acute cholecystitis or biliary ductal dilatation.  - Procalcitonin level 2.32 --> 1.37, ordered for the AM   - TTE 1/6 with no veg  - Called cardiology consult for MARYANN, done 1/27, no veg, d/w Dr Chandan corona noted, no plan for EGD   - s/p Port removal 1/10/25  - Patient still with dropping H/H, receiving multiple transfusion weekly  - Since no veg on MARYANN and port removed, will plan on antibiotics till 2/6/25 (4 weeks)  - Completed oseltamivir  1/10/25  - Continue Nafcillin 2gm IV [id number]  - For Norovirus, continue hydration and supportive care  - For Giardia, Completed 5 days of Albendazole   - Will need Nafcillin till 2/7/25  - Appointment with us in 7 to 10 days  - Discussed PICC line procedure with the patient and IV antibiotic administration   - s/p PICC  - Follow up cultures  - Trend Fever  - Trend WBC      Thank you for allowing me to participate in the care of your patient.   Will Follow    Discussed treatment plan with:  Clinical pharmacy, Dr Burnette and Oncology Dr Chapa

## 2025-01-30 NOTE — CHART NOTE - NSCHARTNOTESELECT_GEN_ALL_CORE
Event Note
Nutrition Services
Event Note
PULM/Event Note

## 2025-01-30 NOTE — PROGRESS NOTE ADULT - ASSESSMENT
39yoF w/ PMHx of metastatic breast cancer ER/KS Neg, HER-2 + on chemotherapy (last dose 12/26/24) (mets to lung, liver, spleen, spine, bone and brain), gastritis w/ intermittent episodes of dark stool (follows w/ Dr. Rosado GI and is on PPI and octreotide daily). Patient presented 1/5 with c/o worsening SOB. She was seen in ED 1/2/25 for weakness and SOB at which time she was found to have Hb of 5 requiring PRBC transfusion and positive for Flu A and started on tamiflu and was discharged from the ED on 1/3/25.  Her respiratory status has worsened since then w/ productive cough associated with body aches and chills. Admitted for sepsis and acute hypoxic respiratory failure with flu A s/p HFNC for worsening respiratory status and s/p course of Tamiflu. Vancomycin and Zosyn was added for possible superimposed bacterial pna. Blood cultures with MSSA. Cardiology consultation requested for evaluation of endocarditis. Blood cultures 1/5, 1/7, 1/9 reporting MSSA , Repeat blood cultures ngtd on 1/11/25, Sputum Cx 1/6 Staphylococcus aureus. MARYANN hold for active gib requring transfusion. Pt had large BM likley diverticular bleed. Had recent scopy. Gi following. ct abd/pelvis w/iv contrast no active bleed (1/13). Urine Cx 1/5 reporting 10k - 49k Escherichia coli  of ? significance  since UA not concerning for UTI.  RVP/COVID 19 PCR + Influenza A.GI PCR  positive norovirus,Giardia.    #Acute blood loss anemia   #GIB  - s/p multiple transfusions during this hospital stay  - goal hgb of 7   - CTA abd/pelvis no active bleed   - PPI/OCTRIO  - GI eval with no plan for scopes 2/2 recently done, pt has had multiple EGD/colonoscopies in past as well.   - per GI, no plan for any endoscopy, pt will likely be transfusion dependent  - hem/onc started Amicar on 1/16  - hem/onc following, pt responding well to amicar, plan to continue outpt, will assess need for transfusions on outpt basis and will set up as needed pending ongoing response to amicar   - d/c on amicar 3.5g q6h x 7 days per onc, plan to taper outpatient  - transfuse 1 u pRBC hgb 7.9 today     #MSSA bacteremia  - blood clx positive 1/5, repeat bl c/s (1/11) ngtd  - ID following  - Continue nafcillin (would need to be 6 weeks duration if no MARYANN done)  - Cardiology following, MARYANN w/o IE  - midline placed, set up IV infusion     #Sepsis 2/2 Flu A w/ superimposed multifocal PNA  #Acute hypoxic respiratory failure 2/2 Flu A w/ superimposed multifocal PNA   - on NC, comfortable  - persistently positive RVP for flu A  - s/p tamiflu   - repeat CT 1/8 shows improved pleural effusion but worsening GGOs, unclear if infectious or malignant etiology  - repeat CT 1/9 with similar findings  - CT PE to evaluate consolidation and PE given persistent hypoxia negative for PE and with improving consolidations  - po steroid tapering dose; will change prednisone 20 -> 10mg qd   - c/w abx per ID recs  - sputum clx growing moderate staph aureus   - TTE EF 65-70%, no WMA  - wean O2     #Chronic normocytic anemia 2/2 metastatic breast cancer on chemo and 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   #Thrombocytopenia likely 2/2 sepsis   #Breast cancer ER/KS Neg, HER-2 pos on chemotherapy w/ mets to lung, liver, spleen, spine, bone and brain  - Baseline Hb ranges 8-9    - s/p prbc transfusion on 1/2, 1/8 and 1/9,1/11,1/13,1/14  - trend CBC, transfuse for hgb <7  - c/w home sub q octreotide and IV protonix  - Monitor CBC and transfuse for Hb<7 and plts<20K in setting of sepsis   - [facility] following  - monitor on tele and   - transfuse 1 unit today 1/30 prior to d/c     #Nororvirus, Giadia gastroenteritis   - GI PCR  positive norovirus,Giadia  - Albendazole per ID (dc on 01/22 -- completed 5 day course)  - ID on board, recs noted  - diarrhea resolved     #Breast cancer ER/KS Neg, HER-2 pos on chemotherapy w/ mets to lung, liver, spleen, spine, bone and brain  - [facility] following, recommending holding all treatment   - c/w pregabalin, methylphenidate (confirmed on ISTOP Reference #: [id number])  - on dilaudid pca pump, continue while admitted, plan to discharge on po meds   - pain management reconsulted, adjusted pain regimen 1/24    #Thrombocytopenia  - likely from chemo/sepsis  - will monitor cbc    #LE edema, improving   - b/l duplex negative  - IV lasix prn  - leg elevation     dvt ppx scd  disposition: d/c tomorrow, pharmacy does not have amicar in stock , needs to be orderd

## 2025-01-31 ENCOUNTER — TRANSCRIPTION ENCOUNTER (OUTPATIENT)
Age: 40
End: 2025-01-31

## 2025-01-31 VITALS
OXYGEN SATURATION: 98 % | SYSTOLIC BLOOD PRESSURE: 100 MMHG | DIASTOLIC BLOOD PRESSURE: 65 MMHG | TEMPERATURE: 98 F | RESPIRATION RATE: 18 BRPM

## 2025-01-31 LAB
GLUCOSE BLDC GLUCOMTR-MCNC: 135 MG/DL — HIGH (ref 70–99)
GLUCOSE BLDC GLUCOMTR-MCNC: 146 MG/DL — HIGH (ref 70–99)
HCT VFR BLD CALC: 28.1 % — LOW (ref 34.5–45)
HGB BLD-MCNC: 8.5 G/DL — LOW (ref 11.5–15.5)
MCHC RBC-ENTMCNC: 29.9 PG — SIGNIFICANT CHANGE UP (ref 27–34)
MCHC RBC-ENTMCNC: 30.2 G/DL — LOW (ref 32–36)
MCV RBC AUTO: 98.9 FL — SIGNIFICANT CHANGE UP (ref 80–100)
PLATELET # BLD AUTO: 140 K/UL — LOW (ref 150–400)
RBC # BLD: 2.84 M/UL — LOW (ref 3.8–5.2)
RBC # FLD: 25 % — HIGH (ref 10.3–14.5)
WBC # BLD: 4.63 K/UL — SIGNIFICANT CHANGE UP (ref 3.8–10.5)
WBC # FLD AUTO: 4.63 K/UL — SIGNIFICANT CHANGE UP (ref 3.8–10.5)

## 2025-01-31 PROCEDURE — 85610 PROTHROMBIN TIME: CPT

## 2025-01-31 PROCEDURE — 87086 URINE CULTURE/COLONY COUNT: CPT

## 2025-01-31 PROCEDURE — G0545: CPT

## 2025-01-31 PROCEDURE — P9047: CPT

## 2025-01-31 PROCEDURE — 87640 STAPH A DNA AMP PROBE: CPT

## 2025-01-31 PROCEDURE — 99285 EMERGENCY DEPT VISIT HI MDM: CPT | Mod: 25

## 2025-01-31 PROCEDURE — 87186 SC STD MICRODIL/AGAR DIL: CPT

## 2025-01-31 PROCEDURE — P9040: CPT

## 2025-01-31 PROCEDURE — 93320 DOPPLER ECHO COMPLETE: CPT

## 2025-01-31 PROCEDURE — 82962 GLUCOSE BLOOD TEST: CPT

## 2025-01-31 PROCEDURE — 93325 DOPPLER ECHO COLOR FLOW MAPG: CPT

## 2025-01-31 PROCEDURE — 82435 ASSAY OF BLOOD CHLORIDE: CPT

## 2025-01-31 PROCEDURE — 97110 THERAPEUTIC EXERCISES: CPT

## 2025-01-31 PROCEDURE — 82140 ASSAY OF AMMONIA: CPT

## 2025-01-31 PROCEDURE — 97163 PT EVAL HIGH COMPLEX 45 MIN: CPT

## 2025-01-31 PROCEDURE — 87040 BLOOD CULTURE FOR BACTERIA: CPT

## 2025-01-31 PROCEDURE — 85014 HEMATOCRIT: CPT

## 2025-01-31 PROCEDURE — 85018 HEMOGLOBIN: CPT

## 2025-01-31 PROCEDURE — 93971 EXTREMITY STUDY: CPT

## 2025-01-31 PROCEDURE — 0225U NFCT DS DNA&RNA 21 SARSCOV2: CPT

## 2025-01-31 PROCEDURE — 85027 COMPLETE CBC AUTOMATED: CPT

## 2025-01-31 PROCEDURE — 87641 MR-STAPH DNA AMP PROBE: CPT

## 2025-01-31 PROCEDURE — 80076 HEPATIC FUNCTION PANEL: CPT

## 2025-01-31 PROCEDURE — 87150 DNA/RNA AMPLIFIED PROBE: CPT

## 2025-01-31 PROCEDURE — 87205 SMEAR GRAM STAIN: CPT

## 2025-01-31 PROCEDURE — 82977 ASSAY OF GGT: CPT

## 2025-01-31 PROCEDURE — 82947 ASSAY GLUCOSE BLOOD QUANT: CPT

## 2025-01-31 PROCEDURE — 84100 ASSAY OF PHOSPHORUS: CPT

## 2025-01-31 PROCEDURE — 94660 CPAP INITIATION&MGMT: CPT

## 2025-01-31 PROCEDURE — 80048 BASIC METABOLIC PNL TOTAL CA: CPT

## 2025-01-31 PROCEDURE — 76942 ECHO GUIDE FOR BIOPSY: CPT

## 2025-01-31 PROCEDURE — 83036 HEMOGLOBIN GLYCOSYLATED A1C: CPT

## 2025-01-31 PROCEDURE — 82728 ASSAY OF FERRITIN: CPT

## 2025-01-31 PROCEDURE — 83540 ASSAY OF IRON: CPT

## 2025-01-31 PROCEDURE — 84132 ASSAY OF SERUM POTASSIUM: CPT

## 2025-01-31 PROCEDURE — 85730 THROMBOPLASTIN TIME PARTIAL: CPT

## 2025-01-31 PROCEDURE — 71045 X-RAY EXAM CHEST 1 VIEW: CPT

## 2025-01-31 PROCEDURE — 84484 ASSAY OF TROPONIN QUANT: CPT

## 2025-01-31 PROCEDURE — 83550 IRON BINDING TEST: CPT

## 2025-01-31 PROCEDURE — 86901 BLOOD TYPING SEROLOGIC RH(D): CPT

## 2025-01-31 PROCEDURE — 87507 IADNA-DNA/RNA PROBE TQ 12-25: CPT

## 2025-01-31 PROCEDURE — 77002 NEEDLE LOCALIZATION BY XRAY: CPT

## 2025-01-31 PROCEDURE — 93312 ECHO TRANSESOPHAGEAL: CPT

## 2025-01-31 PROCEDURE — 87077 CULTURE AEROBIC IDENTIFY: CPT

## 2025-01-31 PROCEDURE — 96374 THER/PROPH/DIAG INJ IV PUSH: CPT

## 2025-01-31 PROCEDURE — 93970 EXTREMITY STUDY: CPT

## 2025-01-31 PROCEDURE — 82330 ASSAY OF CALCIUM: CPT

## 2025-01-31 PROCEDURE — 87449 NOS EACH ORGANISM AG IA: CPT

## 2025-01-31 PROCEDURE — 83880 ASSAY OF NATRIURETIC PEPTIDE: CPT

## 2025-01-31 PROCEDURE — 83605 ASSAY OF LACTIC ACID: CPT

## 2025-01-31 PROCEDURE — 96361 HYDRATE IV INFUSION ADD-ON: CPT

## 2025-01-31 PROCEDURE — 74178 CT ABD&PLV WO CNTR FLWD CNTR: CPT | Mod: MC

## 2025-01-31 PROCEDURE — 86850 RBC ANTIBODY SCREEN: CPT

## 2025-01-31 PROCEDURE — 84702 CHORIONIC GONADOTROPIN TEST: CPT

## 2025-01-31 PROCEDURE — 99239 HOSP IP/OBS DSCHRG MGMT >30: CPT

## 2025-01-31 PROCEDURE — 94640 AIRWAY INHALATION TREATMENT: CPT

## 2025-01-31 PROCEDURE — 99231 SBSQ HOSP IP/OBS SF/LOW 25: CPT

## 2025-01-31 PROCEDURE — 36600 WITHDRAWAL OF ARTERIAL BLOOD: CPT

## 2025-01-31 PROCEDURE — 93308 TTE F-UP OR LMTD: CPT

## 2025-01-31 PROCEDURE — 87070 CULTURE OTHR SPECIMN AEROBIC: CPT

## 2025-01-31 PROCEDURE — 97116 GAIT TRAINING THERAPY: CPT

## 2025-01-31 PROCEDURE — 82803 BLOOD GASES ANY COMBINATION: CPT

## 2025-01-31 PROCEDURE — 87305 ASPERGILLUS AG IA: CPT

## 2025-01-31 PROCEDURE — 84295 ASSAY OF SERUM SODIUM: CPT

## 2025-01-31 PROCEDURE — 81001 URINALYSIS AUTO W/SCOPE: CPT

## 2025-01-31 PROCEDURE — 86922 COMPATIBILITY TEST ANTIGLOB: CPT

## 2025-01-31 PROCEDURE — 36430 TRANSFUSION BLD/BLD COMPNT: CPT

## 2025-01-31 PROCEDURE — 93005 ELECTROCARDIOGRAM TRACING: CPT

## 2025-01-31 PROCEDURE — 84145 PROCALCITONIN (PCT): CPT

## 2025-01-31 PROCEDURE — 36415 COLL VENOUS BLD VENIPUNCTURE: CPT

## 2025-01-31 PROCEDURE — 94760 N-INVAS EAR/PLS OXIMETRY 1: CPT

## 2025-01-31 PROCEDURE — 93971 EXTREMITY STUDY: CPT | Mod: 26,LT

## 2025-01-31 PROCEDURE — 87075 CULTR BACTERIA EXCEPT BLOOD: CPT

## 2025-01-31 PROCEDURE — 76705 ECHO EXAM OF ABDOMEN: CPT

## 2025-01-31 PROCEDURE — 96375 TX/PRO/DX INJ NEW DRUG ADDON: CPT

## 2025-01-31 PROCEDURE — 83735 ASSAY OF MAGNESIUM: CPT

## 2025-01-31 PROCEDURE — 85025 COMPLETE CBC W/AUTO DIFF WBC: CPT

## 2025-01-31 PROCEDURE — 71275 CT ANGIOGRAPHY CHEST: CPT | Mod: MC

## 2025-01-31 PROCEDURE — 84466 ASSAY OF TRANSFERRIN: CPT

## 2025-01-31 PROCEDURE — 86900 BLOOD TYPING SEROLOGIC ABO: CPT

## 2025-01-31 PROCEDURE — 71250 CT THORAX DX C-: CPT | Mod: MC

## 2025-01-31 PROCEDURE — 80053 COMPREHEN METABOLIC PANEL: CPT

## 2025-01-31 RX ORDER — HYDROMORPHONE HYDROCHLORIDE 4 MG/ML
1 INJECTION, SOLUTION INTRAMUSCULAR; INTRAVENOUS; SUBCUTANEOUS
Qty: 42 | Refills: 0
Start: 2025-01-31 | End: 2025-02-06

## 2025-01-31 RX ORDER — PANTOPRAZOLE 20 MG/1
1 TABLET, DELAYED RELEASE ORAL
Qty: 60 | Refills: 0
Start: 2025-01-31 | End: 2025-03-01

## 2025-01-31 RX ORDER — OLANZAPINE 10 MG/1
1 TABLET, FILM COATED ORAL
Qty: 30 | Refills: 0
Start: 2025-01-31 | End: 2025-03-01

## 2025-01-31 RX ORDER — METHADONE HYDROCHLORIDE 5 MG/5ML
1 SOLUTION ORAL
Refills: 0 | DISCHARGE

## 2025-01-31 RX ORDER — DIAZEPAM 5 MG
1 TABLET ORAL
Refills: 0 | DISCHARGE

## 2025-01-31 RX ORDER — METHADONE HYDROCHLORIDE 5 MG/5ML
1 SOLUTION ORAL
Qty: 60 | Refills: 0
Start: 2025-01-31 | End: 2025-03-01

## 2025-01-31 RX ORDER — NAFCILLIN INJECTION 2 G/2G
2 POWDER, FOR SOLUTION INTRAMUSCULAR; INTRAMUSCULAR; INTRAVENOUS
Qty: 0 | Refills: 0 | DISCHARGE
Start: 2025-01-31

## 2025-01-31 RX ORDER — TRANEXAMIC ACID 100 MG/ML
1 INJECTION, SOLUTION INTRAVENOUS
Qty: 14 | Refills: 0
Start: 2025-01-31 | End: 2025-02-06

## 2025-01-31 RX ORDER — HYDROMORPHONE HYDROCHLORIDE 4 MG/ML
1 INJECTION, SOLUTION INTRAMUSCULAR; INTRAVENOUS; SUBCUTANEOUS
Refills: 0 | DISCHARGE

## 2025-01-31 RX ORDER — PANTOPRAZOLE 20 MG/1
1 TABLET, DELAYED RELEASE ORAL
Refills: 0 | DISCHARGE

## 2025-01-31 RX ORDER — LIDOCAINE HYDROCHLORIDE 30 MG/G
1 CREAM TOPICAL
Qty: 30 | Refills: 0
Start: 2025-01-31 | End: 2025-03-01

## 2025-01-31 RX ADMIN — OCTREOTIDE ACETATE 100 MICROGRAM(S): 1000 INJECTION INTRAVENOUS; SUBCUTANEOUS at 05:49

## 2025-01-31 RX ADMIN — PANTOPRAZOLE 40 MILLIGRAM(S): 20 TABLET, DELAYED RELEASE ORAL at 05:49

## 2025-01-31 RX ADMIN — Medication 1 APPLICATION(S): at 11:58

## 2025-01-31 RX ADMIN — MEMANTINE HYDROCHLORIDE 10 MILLIGRAM(S): 7 CAPSULE, EXTENDED RELEASE ORAL at 08:48

## 2025-01-31 RX ADMIN — NAFCILLIN INJECTION 200 GRAM(S): 2 POWDER, FOR SOLUTION INTRAMUSCULAR; INTRAMUSCULAR; INTRAVENOUS at 02:31

## 2025-01-31 RX ADMIN — HYDROMORPHONE HYDROCHLORIDE 0.5 MILLIGRAM(S): 4 INJECTION, SOLUTION INTRAMUSCULAR; INTRAVENOUS; SUBCUTANEOUS at 05:56

## 2025-01-31 RX ADMIN — PREDNISONE 10 MILLIGRAM(S): 5 TABLET ORAL at 05:49

## 2025-01-31 RX ADMIN — HYDROMORPHONE HYDROCHLORIDE 8 MILLIGRAM(S): 4 INJECTION, SOLUTION INTRAMUSCULAR; INTRAVENOUS; SUBCUTANEOUS at 15:00

## 2025-01-31 RX ADMIN — LIDOCAINE HYDROCHLORIDE 1 PATCH: 30 CREAM TOPICAL at 11:57

## 2025-01-31 RX ADMIN — HYDROMORPHONE HYDROCHLORIDE 0.5 MILLIGRAM(S): 4 INJECTION, SOLUTION INTRAMUSCULAR; INTRAVENOUS; SUBCUTANEOUS at 10:20

## 2025-01-31 RX ADMIN — AMINOCAPROIC ACID 3.5 GRAM(S): 1000 TABLET ORAL at 13:23

## 2025-01-31 RX ADMIN — METHYLPHENIDATE HYDROCHLORIDE 20 MILLIGRAM(S): 36 TABLET, EXTENDED RELEASE ORAL at 08:48

## 2025-01-31 RX ADMIN — HYDROMORPHONE HYDROCHLORIDE 8 MILLIGRAM(S): 4 INJECTION, SOLUTION INTRAMUSCULAR; INTRAVENOUS; SUBCUTANEOUS at 08:58

## 2025-01-31 RX ADMIN — AMINOCAPROIC ACID 3.5 GRAM(S): 1000 TABLET ORAL at 05:49

## 2025-01-31 RX ADMIN — NAFCILLIN INJECTION 200 GRAM(S): 2 POWDER, FOR SOLUTION INTRAMUSCULAR; INTRAMUSCULAR; INTRAVENOUS at 10:06

## 2025-01-31 RX ADMIN — HYDROMORPHONE HYDROCHLORIDE 8 MILLIGRAM(S): 4 INJECTION, SOLUTION INTRAMUSCULAR; INTRAVENOUS; SUBCUTANEOUS at 00:00

## 2025-01-31 RX ADMIN — ANTISEPTIC SURGICAL SCRUB 1 APPLICATION(S): 0.04 SOLUTION TOPICAL at 11:59

## 2025-01-31 RX ADMIN — Medication 4 MILLILITER(S): at 09:13

## 2025-01-31 RX ADMIN — NAFCILLIN INJECTION 200 GRAM(S): 2 POWDER, FOR SOLUTION INTRAMUSCULAR; INTRAMUSCULAR; INTRAVENOUS at 13:23

## 2025-01-31 RX ADMIN — HYDROMORPHONE HYDROCHLORIDE 8 MILLIGRAM(S): 4 INJECTION, SOLUTION INTRAMUSCULAR; INTRAVENOUS; SUBCUTANEOUS at 02:55

## 2025-01-31 RX ADMIN — Medication 1.25 MILLIGRAM(S): at 09:13

## 2025-01-31 RX ADMIN — ANTISEPTIC SURGICAL SCRUB 1 APPLICATION(S): 0.04 SOLUTION TOPICAL at 05:26

## 2025-01-31 RX ADMIN — HYDROMORPHONE HYDROCHLORIDE 8 MILLIGRAM(S): 4 INJECTION, SOLUTION INTRAMUSCULAR; INTRAVENOUS; SUBCUTANEOUS at 10:00

## 2025-01-31 RX ADMIN — NAFCILLIN INJECTION 200 GRAM(S): 2 POWDER, FOR SOLUTION INTRAMUSCULAR; INTRAMUSCULAR; INTRAVENOUS at 05:50

## 2025-01-31 RX ADMIN — HYDROMORPHONE HYDROCHLORIDE 8 MILLIGRAM(S): 4 INJECTION, SOLUTION INTRAMUSCULAR; INTRAVENOUS; SUBCUTANEOUS at 14:02

## 2025-01-31 RX ADMIN — HYDROMORPHONE HYDROCHLORIDE 0.5 MILLIGRAM(S): 4 INJECTION, SOLUTION INTRAMUSCULAR; INTRAVENOUS; SUBCUTANEOUS at 10:05

## 2025-01-31 RX ADMIN — HYDROMORPHONE HYDROCHLORIDE 8 MILLIGRAM(S): 4 INJECTION, SOLUTION INTRAMUSCULAR; INTRAVENOUS; SUBCUTANEOUS at 03:55

## 2025-01-31 RX ADMIN — HYDROMORPHONE HYDROCHLORIDE 0.5 MILLIGRAM(S): 4 INJECTION, SOLUTION INTRAMUSCULAR; INTRAVENOUS; SUBCUTANEOUS at 06:40

## 2025-01-31 NOTE — PROGRESS NOTE ADULT - SUBJECTIVE AND OBJECTIVE BOX
[facility]  INFECTIOUS DISEASES at [place] / [place] / [place]  =======================================================                              Salazar Toro MD                              Professor Emeritus:  Dr Warner Mcintosh MD            Diplomates American Board of Internal Medicine & Infectious Diseases                                   Tel  [phone] Fax [phone]                                  Hospital Consult line:  [phone]  =======================================================      [place] [id number]    Follow up: MSSA bacteremia    No fevers   Diarrhea improved       Allergies:  pertuzumab (Other (Severe))  Perjeta (Other (Severe))        REVIEW OF SYSTEMS:  CONSTITUTIONAL:  No Fever or chills  HEENT:   No diplopia or blurred vision.  No earache, sore throat or runny nose.  CARDIOVASCULAR:  No Chest Pain  RESPIRATORY:  No cough, shortness of breath  GASTROINTESTINAL:  No nausea, vomiting + diarrhea.  GENITOURINARY:  No dysuria, frequency or urgency. No Blood in urine  MUSCULOSKELETAL:  no joint aches, no muscle pain  SKIN:  No change in skin, hair or nails.  NEUROLOGIC:  No Headaches      Physical Exam:  GEN: NAD  HEENT: normocephalic and atraumatic.    NECK: Supple.   LUNGS: CTA B/L.  HEART: RRR  ABDOMEN: Soft, NT, ND.  +BS.    : No CVA tenderness  EXTREMITIES: Without  edema.  MSK: No joint swelling  NEUROLOGIC: No Focal Deficits   SKIN: No rash      Vitals:  T(F): 98.2 (31 Jan 2025 08:56), Max: 98.3 (30 Jan 2025 15:00)  HR: 78 (31 Jan 2025 08:56)  BP: 106/70 (31 Jan 2025 08:56)  RR: 18 (31 Jan 2025 08:56)  SpO2: 98% (31 Jan 2025 08:56) (92% - 100%)  temp max in last 48H T(F): , Max: 98.3 (01-30-25 @ 15:00)      Current Antibiotics:  nafcillin  IVPB 2 Gram(s) IV Intermittent every 4 hours    Other medications:  acetylcysteine 10%  Inhalation 4 milliLiter(s) Inhalation every 6 hours  aminocaproic acid Tablet 3.5 Gram(s) Oral every 6 hours  chlorhexidine 2% Cloths 1 Application(s) Topical daily  chlorhexidine 2% Cloths 1 Application(s) Topical <User Schedule>  dextrose 5%. 1000 milliLiter(s) IV Continuous <Continuous>  dextrose 5%. 1000 milliLiter(s) IV Continuous <Continuous>  dextrose 50% Injectable 25 Gram(s) IV Push once  dextrose 50% Injectable 12.5 Gram(s) IV Push once  dextrose 50% Injectable 25 Gram(s) IV Push once  FLUoxetine 80 milliGRAM(s) Oral at bedtime  glucagon  Injectable 1 milliGRAM(s) IntraMuscular once  hydrocortisone 1% Cream 1 Application(s) Topical daily  hydrocortisone 2.5% Rectal Cream 1 Application(s) Rectal two times a day  insulin lispro (ADMELOG) corrective regimen sliding scale   SubCutaneous three times a day before meals  levalbuterol Inhalation 1.25 milliGRAM(s) Inhalation every 6 hours  lidocaine   4% Patch 1 Patch Transdermal daily  memantine 5 milliGRAM(s) Oral at bedtime  memantine 10 milliGRAM(s) Oral with breakfast  methylphenidate 20 milliGRAM(s) Oral <User Schedule>  octreotide  Injectable 100 MICROGram(s) SubCutaneous two times a day  OLANZapine 2.5 milliGRAM(s) Oral at bedtime  pantoprazole  Injectable 40 milliGRAM(s) IV Push every 12 hours  predniSONE   Tablet 10 milliGRAM(s) Oral daily                            8.5    4.63  )-----------( 140      ( 31 Jan 2025 06:48 )             28.1     01-30    138  |  101  |  8.7  ----------------------------<  192[H]  3.9   |  27.0  |  0.62    Ca    8.4      30 Jan 2025 11:24  Mg     1.7     01-30      RECENT CULTURES:  01-17 @ 13:02 .Blood BLOOD     No growth at 5 days    01-17 @ 12:58 .Blood BLOOD     No growth at 5 days        WBC Count: 4.63 K/uL (01-31-25 @ 06:48)  WBC Count: 6.83 K/uL (01-30-25 @ 11:24)  WBC Count: 4.86 K/uL (01-29-25 @ 06:15)  WBC Count: 3.94 K/uL (01-28-25 @ 05:55)  WBC Count: 6.67 K/uL (01-27-25 @ 06:18)    Creatinine: 0.62 mg/dL (01-30-25 @ 11:24)  Creatinine: 0.55 mg/dL (01-29-25 @ 06:15)  Creatinine: 0.58 mg/dL (01-28-25 @ 05:55)  Creatinine: 0.53 mg/dL (01-27-25 @ 06:18)    Procalcitonin: 0.15 ng/mL (01-21-25 @ 06:58)     SARS-CoV-2: NotDetec (01-05-25 @ 10:54)  SARS-CoV-2 Result: NotDetec (01-02-25 @ 19:20)            < from: MARYANN W or WO Ultrasound Enhancing Agent (01.27.25 @ 13:36) >  CONCLUSIONS:      1. Left ventricular systolic function is normal with an ejection fraction visually estimated at 55 to 60 %.   2. Normal right ventricular cavity size and normalright ventricular systolic function.   3. Mild tricuspid regurgitation.   4. Agitated saline injection was negative for intracardiac shunt.   5. No evidence of left atrial appendage thrombus.   6. No echocardiographic evidence of vegetations.    < end of copied text >        < from: TTE Limited W or WO Ultrasound Enhancing Agent (01.06.25 @ 12:55) >  CONCLUSIONS:      1. Technically difficult image quality.   2. Left ventricular systolic function is normal with an ejection fraction visually estimated at 65 to 70 %.   3. Normal right ventricular cavity size, with normal wall thickness, and normal right ventricular systolic function.   4. Compared to the transthoracic echocardiogram performed on 7/21/2024, there have been no significant interval changes.    < end of copied text >

## 2025-01-31 NOTE — DISCHARGE NOTE NURSING/CASE MANAGEMENT/SOCIAL WORK - FINANCIAL ASSISTANCE
[facility] provides services at a reduced cost to those who are determined to be eligible through [facility]’s financial assistance program. Information regarding [facility]’s financial assistance program can be found by going to [email or web address] or by calling [phone].

## 2025-01-31 NOTE — PROGRESS NOTE ADULT - ASSESSMENT
39y  Female with h/o metastatic breast cancer ER/VT Neg, HER-2 + on chemotherapy (last dose 12/26/24) (mets to lung, liver, spleen, spine, bone and brain), gastritis w/ intermittent episodes of dark stool (follows w/ Dr. Alonzo BISHOP and is on PPI and octreotide daily). Patient presented 1/5 with c/o worsening SOB.  She was seen in ED 1/2/25 for for weakness and SOB at which time she was found to have Hb of 5 requiring PRBC transfusion and positive for Flu A and started on tamiflu and was discharged from the ED on 1/3/25.  Her respiratory status has worsened since then w/ productive cough associated with body aches and chills.  Denies sick contacts but has had many frequent hospital visits. Patient has been afebrile, no leukocytosis. Was placed on BIPAP for Worsening respiratory status. continued on Tamiflu. Vancomycn and Zosyn was added. Blood cultures with MSSA. ID input requested.       MSSA bacteremia   Staphylococcus aureus PNA   Influenza A   metastatic breast cancer ER/VT Neg, HER-2 + on chemotherapy   Port in place s/p explant 1/10/25  Stool PCR + Norovirus and Giardia       - Blood cultures 1/5, 1/7, 1/9 reporting MSSA    - Repeat blood cultures 1/10,  1/11/25, 1/17/25 no growth   - Sputum Cx 1/6 Staphylococcus aureus (MSSA)  - Urine Cx 1/5 reporting 10k - 49k Escherichia coli  of ? significance since UA not concerning for UTI   - Stool PCR + Norovirus and Giardia   - RVP/COVID 19 PCR + Influenza A  - S/P Port removal 1/10/25  - CTA Chest reporting No acute pulmonary embolism. Wide spread patchy airspace opacities bilaterally, increased since the prior exam.  - US RUQ reporting No evidence of acute cholecystitis or biliary ductal dilatation.  - Procalcitonin level 2.32 --> 1.37, ordered for the AM   - TTE 1/6 with no veg  - Called cardiology consult for MARYANN, done 1/27, no veg, d/w Dr Chandan corona noted, no plan for EGD   - s/p Port removal 1/10/25  - Patient still with dropping H/H, receiving multiple transfusion weekly  - Since no veg on MARYANN and port removed, will plan on antibiotics till 2/6/25 (4 weeks)  - Completed oseltamivir  1/10/25  - Continue Nafcillin 2gm IV [id number]  - For Norovirus, continue hydration and supportive care  - For Giardia, Completed 5 days of Albendazole   - Will need Nafcillin till 2/7/25  - Appointment with us in 7 to 10 days  - Discussed PICC line procedure with the patient and IV antibiotic administration   - s/p PICC  - Follow up cultures  - Trend Fever  - Trend WBC      Thank you for allowing me to participate in the care of your patient.   Will Follow    Discussed treatment plan with:  Clinical pharmacy, Dr Burnette and Oncology Dr Chapa

## 2025-01-31 NOTE — PROGRESS NOTE ADULT - ASSESSMENT
39y  Female with h/o metastatic breast cancer ER/DE Neg, HER-2 + on chemotherapy (last dose 12/26/24) (mets to lung, liver, spleen, spine, bone and brain), gastritis w/ intermittent episodes of dark stool (follows w/ Dr. Rosado GI and is on PPI and octreotide daily). Patient presented 1/5 with c/o worsening SOB.  She was seen in ED 1/2/25 for for weakness and SOB at which time she was found to have Hb of 5 requiring PRBC transfusion and positive for Flu A and started on tamiflu and was discharged from the ED on 1/3/25.  Her respiratory status has worsened since then w/ productive cough associated with body aches and chills.  Denies sick contacts but has had many frequent hospital visits. Patient has been afebrile, no leukocytosis. Was placed on BIPAP for Worsening respiratory status. continued on Tamiflu. Vancomycn and Zosyn was added. Blood cultures with MSSA.     Metastatic breast cancer   - last dose of trastuzumab was on 12/26/24.  - Hold Tactinib for now until she sees Dr Chapa after discharge   - All treatment on hold at the moment.  -Now on oral pain meds     Cytopenias   - secondary to malignancy and acute illness and now GIB  - S/P multiple transfusions  - GI evaluated for large bloody bowel movement  Suspect rectal bleeding is due to diverticular bleed which are self-limiting - If patient becomes hemodynamically unstable, requiring multiple transfusions,   - CT A/P No evidence of active intraluminal extravasation of contrast.   - GI f/u noted.  no plans for scope.  Previously scoped in Nov. 2024. internal hemorrhoids, gastric erosions, no active bleeding site   - c/w home sub q octreotide and IV protonix  - Has been on  Amicar 4 grams Q 6 H for bleeding with good results  will titrate according to HGB and bleeding. goal of minimal effective dose to prevent bleeding.   - Decreased Amicar to 3.5 grams Q6H 1/27- [place]- can try tranexamic acid if not covered and unaffordable will have to D?C Amicar and follow up with Dr Chapa in the office on Monday for possible AVastin  - S/P 1 UPRBC yesterday now Hgb 8.5   -Transfuse if hgb<7.0.      Respiratory failure   - multifocal pneumonia and flu+  - S/P oseltamivir    - Management as per primary team.  - TTE 1/6 with no veg  - Cardiology following- . S/P MARYANN as above    MSSA Bacteremia  - cultures persistently positive. pt afebrile.   -- S/P Port removal 1/10/25-  PICC line prior to d/c for outpt chemo   - She is concerned about access. Will place once closer to discharge.  + Noro virus and giardia lamblia  -- For Giardia, Completed 5 days of Albendazole   - ID following- - Since no veg on MARYANN and port removed, will plan on antibiotics till 2/6/25 (4 weeks) Continue Nafcillin 2gm IV [id number]  --S/P left; basilic vein PICC placement at bedside by PA     Anticipating discharge home today

## 2025-01-31 NOTE — DISCHARGE NOTE NURSING/CASE MANAGEMENT/SOCIAL WORK - PATIENT PORTAL LINK FT
You can access the FollowMyHealth Patient Portal offered by [facility] by registering at the following website: [email or web address]. By joining [facility]’s FollowMyHealth portal, you will also be able to view your health information using other applications (apps) compatible with our system.

## 2025-01-31 NOTE — PROGRESS NOTE ADULT - REASON FOR ADMISSION
sepsis

## 2025-01-31 NOTE — DISCHARGE NOTE NURSING/CASE MANAGEMENT/SOCIAL WORK - NSDCPEFALRISK_GEN_ALL_CORE
For information on Fall & Injury Prevention, visit: [email or web address] OR  [email or web address] OR  https://www.cdc.gov/steadi/patient.html
Pulmonologist: Dr. Akbar Yepez # [phone]          Dr. Donnelly Neurologist,  Dr Karsten Anderson  CARDS

## 2025-01-31 NOTE — PROGRESS NOTE ADULT - PROVIDER SPECIALTY LIST ADULT
Cardiology
Heme/Onc
Hospitalist
Infectious Disease
Pain Medicine
Pulmonology
Cardiology
Cardiology
Heme/Onc
Hospitalist
Infectious Disease
Infectious Disease
Pain Medicine
Heme/Onc
Hospitalist
Hospitalist
Infectious Disease
Infectious Disease
Pain Medicine
Heme/Onc
Hospitalist
Infectious Disease
Pain Medicine
Pain Medicine
Hospitalist
Infectious Disease
Pain Medicine
Pain Medicine
Hospitalist

## 2025-01-31 NOTE — PROGRESS NOTE ADULT - SUBJECTIVE AND OBJECTIVE BOX
39y  Female with h/o metastatic breast cancer ER/OK Neg, HER-2 + on chemotherapy (last dose 12/26/24) (mets to lung, liver, spleen, spine, bone and brain), gastritis w/ intermittent episodes of dark stool (follows w/ Dr. Rosado [facility] and is on PPI and octreotide daily). Patient presented 1/5 with c/o worsening SOB.  She was seen in ED 1/2/25 for for weakness and SOB at which time she was found to have Hb of 5 requiring PRBC transfusion and positive for Flu A and started on tamiflu and was discharged from the ED on 1/3/25.  Her respiratory status has worsened since then w/ productive cough associated with body aches and chills.  Denies sick contacts but has had many frequent hospital visits. Patient has been afebrile, no leukocytosis. Was placed on BIPAP for Worsening respiratory status. continued on Tamiflu. Vancomycn and Zosyn was added. Blood cultures with MSSA.     PAST MEDICAL & SURGICAL HISTORY:  H/O compression fracture of spine  Anxiety  Metastatic breast cancer  H/O pleural effusion  Pericardial effusion  S/P tonsillectomy  H/O chest tube placement  12/23/21  S/P pericardiocentesis  12/28/21    Allergies  pertuzumab (Other (Severe))  Perjeta (Other (Severe))    MEDICATIONS  (STANDING):  acetylcysteine 10%  Inhalation 4 milliLiter(s) Inhalation every 6 hours  aminocaproic acid Tablet 4 Gram(s) Oral every 6 hours  chlorhexidine 2% Cloths 1 Application(s) Topical daily  dextrose 5%. 1000 milliLiter(s) (50 mL/Hr) IV Continuous <Continuous>  dextrose 5%. 1000 milliLiter(s) (100 mL/Hr) IV Continuous <Continuous>  dextrose 50% Injectable 25 Gram(s) IV Push once  dextrose 50% Injectable 12.5 Gram(s) IV Push once  dextrose 50% Injectable 25 Gram(s) IV Push once  FLUoxetine Solution 80 milliGRAM(s) Oral at bedtime  glucagon  Injectable 1 milliGRAM(s) IntraMuscular once  hydrocortisone 1% Cream 1 Application(s) Topical daily  hydrocortisone 2.5% Rectal Cream 1 Application(s) Rectal two times a day  HYDROmorphone PCA (1 mG/mL) 30 milliLiter(s) PCA Continuous PCA Continuous  insulin lispro (ADMELOG) corrective regimen sliding scale   SubCutaneous three times a day before meals  levalbuterol Inhalation 1.25 milliGRAM(s) Inhalation every 6 hours  memantine 5 milliGRAM(s) Oral at bedtime  memantine 10 milliGRAM(s) Oral with breakfast  methadone    Tablet 10 milliGRAM(s) Oral at bedtime  methadone    Tablet 5 milliGRAM(s) Oral <User Schedule>  methylphenidate 20 milliGRAM(s) Oral <User Schedule>  nafcillin  IVPB 2 Gram(s) IV Intermittent every 4 hours  octreotide  Injectable 100 MICROGram(s) SubCutaneous two times a day  OLANZapine 2.5 milliGRAM(s) Oral at bedtime  pantoprazole  Injectable 40 milliGRAM(s) IV Push every 12 hours  predniSONE   Tablet 20 milliGRAM(s) Oral daily  pregabalin 200 milliGRAM(s) Oral every 8 hours  sodium chloride 0.9% with potassium chloride 20 mEq/L 1000 milliLiter(s) (100 mL/Hr) IV Continuous <Continuous>    MEDICATIONS  (PRN):  acetaminophen     Tablet .. 650 milliGRAM(s) Oral every 6 hours PRN Temp greater or equal to 38C (100.4F), Mild Pain (1 - 3)  aluminum hydroxide/magnesium hydroxide/simethicone Suspension 30 milliLiter(s) Oral every 4 hours PRN Dyspepsia  benzonatate 100 milliGRAM(s) Oral every 8 hours PRN Cough  dextrose Oral Gel 15 Gram(s) Oral once PRN Blood Glucose LESS THAN 70 milliGRAM(s)/deciliter  guaifenesin/dextromethorphan Oral Liquid 10 milliLiter(s) Oral every 4 hours PRN Cough  HYDROmorphone  Injectable 0.5 milliGRAM(s) IV Push every 3 hours PRN Severe Pain (7 - 10)  melatonin 3 milliGRAM(s) Oral at bedtime PRN Insomnia  naloxone Injectable 0.1 milliGRAM(s) IV Push every 3 minutes PRN For ANY of the following changes in patient status:  A. RR LESS THAN 10 breaths per minute, B. Oxygen saturation LESS THAN 90%, C. Sedation score of 6  ondansetron Injectable 4 milliGRAM(s) IV Push every 8 hours PRN Nausea and/or Vomiting    Vital Signs Last 24 Hrs  T(C): 36.7 (31 Jan 2025 11:25), Max: 36.8 (30 Jan 2025 15:00)  T(F): 98 (31 Jan 2025 11:25), Max: 98.3 (30 Jan 2025 15:00)  HR: 87 (31 Jan 2025 11:25) (77 - 98)  BP: 103/64 (31 Jan 2025 11:25) (91/57 - 113/75)  BP(mean): --  RR: 18 (31 Jan 2025 11:25) (16 - 18)  SpO2: 95% (31 Jan 2025 11:25) (92% - 100%)    Parameters below as of 31 Jan 2025 11:25  Patient On (Oxygen Delivery Method): nasal cannula, 2L      PE:  NAD  On O2 NC  bilateral rales  abd soft, nd, nt  a/o x 3      CBC                          8.5    4.63  )-----------( 140      ( 31 Jan 2025 06:48 )             28.1                             7.5    4.86  )-----------( 147      ( 29 Jan 2025 06:15 )             25.0                             7.3    3.94  )-----------( 134      ( 28 Jan 2025 05:55 )             24.7                             8.7    6.67  )-----------( 155      ( 27 Jan 2025 06:18 )             29.0         Chem:       01-30    138  |  101  |  8.7  ----------------------------<  192[H]  3.9   |  27.0  |  0.62    Ca    8.4      30 Jan 2025 11:24  Mg     1.7     01-30 01-29    138  |  103  |  7.0[L]  ----------------------------<  107[H]  3.0[L]   |  26.0  |  0.55    Ca    8.0[L]      29 Jan 2025 06:15        01-29    138  |  103  |  7.0[L]  ----------------------------<  107[H]  3.0[L]   |  26.0  |  0.55    Ca    8.0[L]      29 Jan 2025 06:15      01-28    142  |  105  |  7.5[L]  ----------------------------<  152[H]  3.5   |  26.0  |  0.58    Ca    8.1[L]      28 Jan 2025 05:55        01-27    140  |  104  |  7.6[L]  ----------------------------<  130[H]  3.9   |  25.0  |  0.53    Ca    8.3[L]      27 Jan 2025 06:18        01-21    139  |  105  |  4.5[L]  ----------------------------<  132[H]  3.8   |  24.0  |  0.42[L]    Ca    8.0[L]      21 Jan 2025 06:58    TPro  5.1[L]  /  Alb  2.3[L]  /  TBili  0.5  /  DBili  x   /  AST  19  /  ALT  12  /  AlkPhos  140[H]  01-21 01-20    138  |  103  |  4.9[L]  ----------------------------<  167[H]  3.9   |  25.0  |  0.51    Ca    8.0[L]      20 Jan 2025 07:25    TPro  5.5[L]  /  Alb  2.3[L]  /  TBili  0.6  /  DBili  x   /  AST  19  /  ALT  13  /  AlkPhos  165[H]  01-20 01-17    139  |  104  |  8.7  ----------------------------<  163[H]  2.9[LL]   |  26.0  |  0.70    Ca    7.3[L]      17 Jan 2025 06:45  Phos  4.2     01-17  Mg     1.5     01-17    TPro  5.0[L]  /  Alb  2.1[L]  /  TBili  0.8  /  DBili  0.3  /  AST  17  /  ALT  13  /  AlkPhos  127[H]  01-16        MARYANN performed revealing:   Left Ventricle: Left ventricular systolic function is normal with an ejection fraction visually estimated at 55 to 60%. There are no regional wall motion abnormalities seen. Unable to assess left ventricular diastolic function due to insufficient data.  Right Ventricle: The right ventricular cavity is normal in size and right ventricular systolic function is normal.  Left Atrium: The left atrium is normal in size. There is no evidence of left atrial appendage thrombus.  Right Atrium: The right atrium is normal in size.  Interatrial Septum: Lipomatous interatrial septal hypertrophy present. Agitated saline injection was negative for intracardiac shunt.  Aortic Valve: The aortic valve is tricuspid with normal leaflet excursion. There is no evidence of aortic regurgitation.  Mitral Valve: Structurally normal mitral valve with normal leaflet excursion. There is trace mitral regurgitation. There is normal pulmonary venous flow.  Tricuspid Valve: Structurally normal tricuspid valve with normal leaflet excursion. There is mild tricuspid regurgitation. There is no echocardiographic evidence of pulmonary hypertension. Estimated pulmonary artery systolic pressure is 17 mmHg. IVC was evaluated but not visualized.  Pulmonic Valve: Structurally normal pulmonic valve with normal leaflet excursion. There is no evidence of pulmonic regurgitation.  Pulmonary Artery: The main pulmonary artery is normal in size, origin, and position.  Aorta: The aortic root appears normal in size.  Pericardium: No pericardial effusion seen.

## 2025-02-02 NOTE — ED ADULT NURSE NOTE - TEMPLATE LIST FOR HEAD TO TOE ASSESSMENT
Signs Of Vitality Reviewed: Yes    IMPORTANT EVENTS THIS SHIFT:  Pt being discharged, went over med changes, answered all questions     IMPORTANT EVENTS COMING UP/GOALS (PLEASE INCLUDE WHITE BOARD AND DISCHARGE BOARD UPDATES):   PATIENT SPECIAL NEEDS/ACCOMMODATIONS:                
General

## 2025-02-06 ENCOUNTER — EMERGENCY (EMERGENCY)
Facility: HOSPITAL | Age: 40
LOS: 1 days | Discharge: HOME | End: 2025-02-06
Attending: EMERGENCY MEDICINE
Payer: MEDICARE

## 2025-02-06 VITALS
RESPIRATION RATE: 18 BRPM | OXYGEN SATURATION: 99 % | TEMPERATURE: 98 F | HEART RATE: 71 BPM | DIASTOLIC BLOOD PRESSURE: 84 MMHG | SYSTOLIC BLOOD PRESSURE: 119 MMHG

## 2025-02-06 VITALS
SYSTOLIC BLOOD PRESSURE: 90 MMHG | TEMPERATURE: 98 F | DIASTOLIC BLOOD PRESSURE: 56 MMHG | RESPIRATION RATE: 17 BRPM | HEIGHT: 62 IN | HEART RATE: 82 BPM | OXYGEN SATURATION: 90 % | WEIGHT: 157.85 LBS

## 2025-02-06 DIAGNOSIS — Z98.890 OTHER SPECIFIED POSTPROCEDURAL STATES: Chronic | ICD-10-CM

## 2025-02-06 DIAGNOSIS — Z90.89 ACQUIRED ABSENCE OF OTHER ORGANS: Chronic | ICD-10-CM

## 2025-02-06 LAB
ANISOCYTOSIS BLD QL: SLIGHT — SIGNIFICANT CHANGE UP
BASOPHILS # BLD AUTO: 0 K/UL — SIGNIFICANT CHANGE UP (ref 0–0.2)
BASOPHILS NFR BLD AUTO: 0 % — SIGNIFICANT CHANGE UP (ref 0–2)
BLD GP AB SCN SERPL QL: SIGNIFICANT CHANGE UP
BURR CELLS BLD QL SMEAR: PRESENT — SIGNIFICANT CHANGE UP
DACRYOCYTES BLD QL SMEAR: SLIGHT — SIGNIFICANT CHANGE UP
EOSINOPHIL # BLD AUTO: 0.07 K/UL — SIGNIFICANT CHANGE UP (ref 0–0.5)
EOSINOPHIL NFR BLD AUTO: 1.7 % — SIGNIFICANT CHANGE UP (ref 0–6)
GIANT PLATELETS BLD QL SMEAR: PRESENT — SIGNIFICANT CHANGE UP
HCG SERPL-ACNC: <4 MIU/ML — SIGNIFICANT CHANGE UP
HCT VFR BLD CALC: 20.9 % — CRITICAL LOW (ref 34.5–45)
HGB BLD-MCNC: 6.2 G/DL — CRITICAL LOW (ref 11.5–15.5)
HYPOCHROMIA BLD QL: SIGNIFICANT CHANGE UP
LYMPHOCYTES # BLD AUTO: 0.41 K/UL — LOW (ref 1–3.3)
LYMPHOCYTES # BLD AUTO: 9.6 % — LOW (ref 13–44)
MACROCYTES BLD QL: SLIGHT — SIGNIFICANT CHANGE UP
MANUAL SMEAR VERIFICATION: SIGNIFICANT CHANGE UP
MCHC RBC-ENTMCNC: 29.7 G/DL — LOW (ref 32–36)
MCHC RBC-ENTMCNC: 29.8 PG — SIGNIFICANT CHANGE UP (ref 27–34)
MCV RBC AUTO: 100.5 FL — HIGH (ref 80–100)
METAMYELOCYTES # FLD: 0.9 % — HIGH (ref 0–0)
METAMYELOCYTES NFR BLD: 0.9 % — HIGH (ref 0–0)
MONOCYTES # BLD AUTO: 0.26 K/UL — SIGNIFICANT CHANGE UP (ref 0–0.9)
MONOCYTES NFR BLD AUTO: 6.1 % — SIGNIFICANT CHANGE UP (ref 2–14)
MYELOCYTES NFR BLD: 1.7 % — HIGH (ref 0–0)
NEUTROPHILS # BLD AUTO: 3.46 K/UL — SIGNIFICANT CHANGE UP (ref 1.8–7.4)
NEUTROPHILS NFR BLD AUTO: 80 % — HIGH (ref 43–77)
NRBC # BLD: 1 /100 WBCS — HIGH (ref 0–0)
NRBC BLD-RTO: 1 /100 WBCS — HIGH (ref 0–0)
OVALOCYTES BLD QL SMEAR: SLIGHT — SIGNIFICANT CHANGE UP
PLAT MORPH BLD: NORMAL — SIGNIFICANT CHANGE UP
PLATELET # BLD AUTO: 129 K/UL — LOW (ref 150–400)
POIKILOCYTOSIS BLD QL AUTO: SLIGHT — SIGNIFICANT CHANGE UP
POLYCHROMASIA BLD QL SMEAR: SIGNIFICANT CHANGE UP
RBC # BLD: 2.08 M/UL — LOW (ref 3.8–5.2)
RBC # FLD: 23.2 % — HIGH (ref 10.3–14.5)
RBC BLD AUTO: ABNORMAL
WBC # BLD: 4.32 K/UL — SIGNIFICANT CHANGE UP (ref 3.8–10.5)
WBC # FLD AUTO: 4.32 K/UL — SIGNIFICANT CHANGE UP (ref 3.8–10.5)

## 2025-02-06 PROCEDURE — 99285 EMERGENCY DEPT VISIT HI MDM: CPT | Mod: 25

## 2025-02-06 PROCEDURE — 36415 COLL VENOUS BLD VENIPUNCTURE: CPT

## 2025-02-06 PROCEDURE — 96376 TX/PRO/DX INJ SAME DRUG ADON: CPT

## 2025-02-06 PROCEDURE — P9016: CPT

## 2025-02-06 PROCEDURE — 96374 THER/PROPH/DIAG INJ IV PUSH: CPT

## 2025-02-06 PROCEDURE — 99223 1ST HOSP IP/OBS HIGH 75: CPT

## 2025-02-06 PROCEDURE — 85025 COMPLETE CBC W/AUTO DIFF WBC: CPT

## 2025-02-06 PROCEDURE — 86901 BLOOD TYPING SEROLOGIC RH(D): CPT

## 2025-02-06 PROCEDURE — 86900 BLOOD TYPING SEROLOGIC ABO: CPT

## 2025-02-06 PROCEDURE — 71045 X-RAY EXAM CHEST 1 VIEW: CPT

## 2025-02-06 PROCEDURE — 86922 COMPATIBILITY TEST ANTIGLOB: CPT

## 2025-02-06 PROCEDURE — 36430 TRANSFUSION BLD/BLD COMPNT: CPT

## 2025-02-06 PROCEDURE — 96375 TX/PRO/DX INJ NEW DRUG ADDON: CPT

## 2025-02-06 PROCEDURE — 86850 RBC ANTIBODY SCREEN: CPT

## 2025-02-06 PROCEDURE — 84702 CHORIONIC GONADOTROPIN TEST: CPT

## 2025-02-06 PROCEDURE — G0378: CPT

## 2025-02-06 PROCEDURE — 71045 X-RAY EXAM CHEST 1 VIEW: CPT | Mod: 26

## 2025-02-06 RX ORDER — HYDROMORPHONE HYDROCHLORIDE 4 MG/ML
2 INJECTION, SOLUTION INTRAMUSCULAR; INTRAVENOUS; SUBCUTANEOUS ONCE
Refills: 0 | Status: DISCONTINUED | OUTPATIENT
Start: 2025-02-06 | End: 2025-02-06

## 2025-02-06 RX ORDER — DIPHENHYDRAMINE HCL 25 MG
25 CAPSULE ORAL ONCE
Refills: 0 | Status: COMPLETED | OUTPATIENT
Start: 2025-02-06 | End: 2025-02-06

## 2025-02-06 RX ORDER — ONDANSETRON 4 MG/1
4 TABLET, ORALLY DISINTEGRATING ORAL ONCE
Refills: 0 | Status: COMPLETED | OUTPATIENT
Start: 2025-02-06 | End: 2025-02-06

## 2025-02-06 RX ADMIN — HYDROMORPHONE HYDROCHLORIDE 2 MILLIGRAM(S): 4 INJECTION, SOLUTION INTRAMUSCULAR; INTRAVENOUS; SUBCUTANEOUS at 17:58

## 2025-02-06 RX ADMIN — HYDROMORPHONE HYDROCHLORIDE 2 MILLIGRAM(S): 4 INJECTION, SOLUTION INTRAMUSCULAR; INTRAVENOUS; SUBCUTANEOUS at 11:21

## 2025-02-06 RX ADMIN — HYDROMORPHONE HYDROCHLORIDE 2 MILLIGRAM(S): 4 INJECTION, SOLUTION INTRAMUSCULAR; INTRAVENOUS; SUBCUTANEOUS at 21:38

## 2025-02-06 RX ADMIN — Medication 25 MILLIGRAM(S): at 14:38

## 2025-02-06 RX ADMIN — ONDANSETRON 4 MILLIGRAM(S): 4 TABLET, ORALLY DISINTEGRATING ORAL at 21:11

## 2025-02-06 NOTE — ED CDU PROVIDER INITIAL DAY NOTE - ATTENDING APP SHARED VISIT CONTRIBUTION OF CARE
39yoF; with PMH signif for Metastatic Breast Ca; p/w nolasco and sent in for transfusion. patient recently had admission for anemia.  denies chest pain. denies f/c/s. denies cough.  General:     NAD  Head:     NC/AT, EOMI, oral mucosa moist  Neck:     trachea midline  Lungs:     CTA b/l, no w/r/r  CVS:     S1S2, RRR, no m/g/r  A/P: 39yoF  p/w anemia, in need of transfusion  -tranfusion  -continue cardiac and pulse oximetry  -re-eval s/p transfusion

## 2025-02-06 NOTE — ED PROVIDER NOTE - PHYSICAL EXAMINATION
VITAL SIGNS: I have reviewed nursing notes and confirm.  CONSTITUTIONAL:  sitting comfortably, in no acute distress.  SKIN: Skin exam is warm and dry, no acute rash.  HEAD: Normocephalic; atraumatic.  EYES: PERRL, EOM intact; conjunctiva and sclera clear.  ENT: No nasal discharge; airway clear. Throat clear.  NECK: Supple; non tender.    CARD: Regular rate and rhythm.  RESP: No wheezes,  no rales or rhonchi.   ABD:  soft; non-distended; non-tender;   EXT: Normal ROM. No clubbing, cyanosis or edema.  NEURO: Alert, oriented. Grossly unremarkable. No focal deficits.  moves all extremities.   PSYCH: Cooperative, appropriate.

## 2025-02-06 NOTE — ED ADULT NURSE NOTE - PHONE #
[FreeTextEntry1] : Medina is a 15-year-old girl 2 months status post the above fracture. Today the short arm cast was removed, and a new short arm cast was applied. F/u in 1 month for repeat xrays of left forearm (three views). All of the mother's questions were addressed. She understood and agreed with the plan.\par \par 
unable to obtain at this time

## 2025-02-06 NOTE — ED ADULT NURSE NOTE - OBJECTIVE STATEMENT
A&Ox4, RR even and unlabored on 4L NC. Skin is warm, dry but pale in color. PT coming to Ed reporting hgb of 7.1. Pt states she woke up early this morning with abd cramping and had diarrhea and some blood in her urine. Denies nausea or vomiting. +upper back and left shoulder pain. Pt presents with double lumen PICC line in upper left arm. bloods sent to labs, awaiting results.

## 2025-02-06 NOTE — ED CDU PROVIDER INITIAL DAY NOTE - PHYSICAL EXAMINATION
Gen: Well appearing in NAD  Head: NC/AT  Neck: trachea midline  Resp:  No distress, NC in place  Ext: no deformities  Neuro:  A&O appears non focal  Skin:  Warm and dry as visualized  Psych:  Normal affect and mood

## 2025-02-06 NOTE — ED CDU PROVIDER DISPOSITION NOTE - ATTENDING CONTRIBUTION TO CARE
I agree with the PA's note and was available for any issues/concerns. I was directly involved in patient care. My brief overall assessment is as follows: metastatic breast cancer, recent flu/dark stool, hgb 6.2, family/pt wanting transfusion only no further w/u with similar visits in past. symptoms improved with transfusion, return precautions. stable for d/c.

## 2025-02-06 NOTE — ED CDU PROVIDER DISPOSITION NOTE - PATIENT PORTAL LINK FT
You can access the FollowMyHealth Patient Portal offered by [facility] by registering at the following website: [email or web address]. By joining [employer]’s FollowMyHealth portal, you will also be able to view your health information using other applications (apps) compatible with our system.

## 2025-02-06 NOTE — ED PROVIDER NOTE - CLINICAL SUMMARY MEDICAL DECISION MAKING FREE TEXT BOX
38yo F hx metastatic breast cancer ER/OK Neg, HER-2 + on chemotherapy (last dose 12/26/24) (mets to lung, liver, spleen, spine, bone and brain), gastritis w/ intermittent episodes of dark stool c/o low hemoglobin. Plan for labs, cxr, transfusion.

## 2025-02-06 NOTE — ED ADULT NURSE NOTE - NSFALLRISKINTERV_ED_ALL_ED

## 2025-02-06 NOTE — ED CDU PROVIDER DISPOSITION NOTE - CLINICAL COURSE
39 year old female PMHx metastatic breast CA recently admitted for FLU+/anemia reports dark stools, without outpatient labs concerning for anemia. Pt with SOB, improved with NC O2.  pt and family only requesting blood transfusion at this point in time, no further investigation into hypoxia or anemia. Pt HGB 6.2, pt received 2 units PRBC while in ED. Feeling better at this time  Pt reassessed, pt feeling better at this time, vss, pt able to walk, talk and vocalized plan of action. Discussed in depth and explained to pt in depth the next steps that need to be taken including proper follow up with PCP or specialists. All incidental findings were discussed with pt as well. Pt verbalized their concerns and all questions were answered. Pt understands dispo and wants discharge. Given good instructions when to return to ED, strict return precautions and importance of f/u.

## 2025-02-06 NOTE — ED ADULT NURSE REASSESSMENT NOTE - NS ED NURSE REASSESS COMMENT FT1
1 unit of PRBCs given. Consent in chart. Risks and benefits explained to patient. Patient verbalized understanding of risks and benefits. Patient aware of possible side effects. Vital signs stable. Second RN at bedside for confirmation.

## 2025-02-06 NOTE — ED CDU PROVIDER INITIAL DAY NOTE - OBJECTIVE STATEMENT
38 yo female metastatic breast CA recently admitted to hospital fluA positive/anemia, reports of dark stools, found to be anemic on outpatient labs with associated sob improved with NC 02, pt hypoxic on arrival after transportation without home 02, now saturating well on PRN NC 02 @ 4L  per min. pt and family only requesting blood transfusion at this point in time, no further investigation into hypoxia or anemia.   sister broderick whom is HCP confirming FULL CODE, and only for patient to receive blood while in the ED

## 2025-02-06 NOTE — ED PROVIDER NOTE - OBJECTIVE STATEMENT
38 yo F hx metastatic breast cancer ER/[place] Neg, HER-2 + on chemotherapy (last dose 12/26/24) (mets to lung, liver, spleen, spine, bone and brain), gastritis w/ intermittent episodes of dark stool  complaining of abnormal lab result. Patient states her oncologist called her with a hemoglobin of 7.1. Patient endorsing SOB, sating low 80s on RA. Patient placed on 4L O2. Endorsing chronic pain. Denies chest pain, N/V/D, abdominal pain, numbness or tingling, fever, chills.

## 2025-02-08 ENCOUNTER — EMERGENCY (EMERGENCY)
Facility: HOSPITAL | Age: 40
LOS: 1 days | End: 2025-02-08
Attending: EMERGENCY MEDICINE
Payer: MEDICARE

## 2025-02-08 VITALS
RESPIRATION RATE: 16 BRPM | WEIGHT: 130.07 LBS | SYSTOLIC BLOOD PRESSURE: 104 MMHG | OXYGEN SATURATION: 100 % | HEIGHT: 62 IN | TEMPERATURE: 98 F | HEART RATE: 86 BPM | DIASTOLIC BLOOD PRESSURE: 61 MMHG

## 2025-02-08 DIAGNOSIS — Z98.890 OTHER SPECIFIED POSTPROCEDURAL STATES: Chronic | ICD-10-CM

## 2025-02-08 DIAGNOSIS — Z90.89 ACQUIRED ABSENCE OF OTHER ORGANS: Chronic | ICD-10-CM

## 2025-02-08 LAB
ALBUMIN SERPL ELPH-MCNC: 3.1 G/DL — LOW (ref 3.3–5.2)
ALP SERPL-CCNC: 67 U/L — SIGNIFICANT CHANGE UP (ref 40–120)
ALT FLD-CCNC: 7 U/L — SIGNIFICANT CHANGE UP
ANION GAP SERPL CALC-SCNC: 13 MMOL/L — SIGNIFICANT CHANGE UP (ref 5–17)
ANION GAP SERPL CALC-SCNC: 14 MMOL/L — SIGNIFICANT CHANGE UP (ref 5–17)
AST SERPL-CCNC: 28 U/L — SIGNIFICANT CHANGE UP
BASOPHILS # BLD AUTO: 0.01 K/UL — SIGNIFICANT CHANGE UP (ref 0–0.2)
BASOPHILS NFR BLD AUTO: 0.2 % — SIGNIFICANT CHANGE UP (ref 0–2)
BILIRUB SERPL-MCNC: 0.8 MG/DL — SIGNIFICANT CHANGE UP (ref 0.4–2)
BUN SERPL-MCNC: 10.2 MG/DL — SIGNIFICANT CHANGE UP (ref 8–20)
BUN SERPL-MCNC: 13.7 MG/DL — SIGNIFICANT CHANGE UP (ref 8–20)
CALCIUM SERPL-MCNC: 7.8 MG/DL — LOW (ref 8.4–10.5)
CALCIUM SERPL-MCNC: 8.6 MG/DL — SIGNIFICANT CHANGE UP (ref 8.4–10.5)
CHLORIDE SERPL-SCNC: 105 MMOL/L — SIGNIFICANT CHANGE UP (ref 96–108)
CHLORIDE SERPL-SCNC: 106 MMOL/L — SIGNIFICANT CHANGE UP (ref 96–108)
CO2 SERPL-SCNC: 23 MMOL/L — SIGNIFICANT CHANGE UP (ref 22–29)
CO2 SERPL-SCNC: 25 MMOL/L — SIGNIFICANT CHANGE UP (ref 22–29)
CREAT SERPL-MCNC: 0.48 MG/DL — LOW (ref 0.5–1.3)
CREAT SERPL-MCNC: 0.57 MG/DL — SIGNIFICANT CHANGE UP (ref 0.5–1.3)
EGFR: 119 ML/MIN/1.73M2 — SIGNIFICANT CHANGE UP
EGFR: 119 ML/MIN/1.73M2 — SIGNIFICANT CHANGE UP
EGFR: 124 ML/MIN/1.73M2 — SIGNIFICANT CHANGE UP
EGFR: 124 ML/MIN/1.73M2 — SIGNIFICANT CHANGE UP
EOSINOPHIL # BLD AUTO: 0.01 K/UL — SIGNIFICANT CHANGE UP (ref 0–0.5)
EOSINOPHIL NFR BLD AUTO: 0.2 % — SIGNIFICANT CHANGE UP (ref 0–6)
GLUCOSE SERPL-MCNC: 75 MG/DL — SIGNIFICANT CHANGE UP (ref 70–99)
GLUCOSE SERPL-MCNC: 98 MG/DL — SIGNIFICANT CHANGE UP (ref 70–99)
HCT VFR BLD CALC: 25.8 % — LOW (ref 34.5–45)
HGB BLD-MCNC: 8.3 G/DL — LOW (ref 11.5–15.5)
IMM GRANULOCYTES NFR BLD AUTO: 3.3 % — HIGH (ref 0–0.9)
LIDOCAIN IGE QN: 21 U/L — LOW (ref 22–51)
LYMPHOCYTES # BLD AUTO: 0.57 K/UL — LOW (ref 1–3.3)
LYMPHOCYTES # BLD AUTO: 9.1 % — LOW (ref 13–44)
MCHC RBC-ENTMCNC: 30 PG — SIGNIFICANT CHANGE UP (ref 27–34)
MCHC RBC-ENTMCNC: 32.2 G/DL — SIGNIFICANT CHANGE UP (ref 32–36)
MCV RBC AUTO: 93.1 FL — SIGNIFICANT CHANGE UP (ref 80–100)
MONOCYTES # BLD AUTO: 0.51 K/UL — SIGNIFICANT CHANGE UP (ref 0–0.9)
MONOCYTES NFR BLD AUTO: 8.1 % — SIGNIFICANT CHANGE UP (ref 2–14)
NEUTROPHILS # BLD AUTO: 4.96 K/UL — SIGNIFICANT CHANGE UP (ref 1.8–7.4)
NEUTROPHILS NFR BLD AUTO: 79.1 % — HIGH (ref 43–77)
PLATELET # BLD AUTO: 143 K/UL — LOW (ref 150–400)
POTASSIUM SERPL-MCNC: 2.6 MMOL/L — CRITICAL LOW (ref 3.5–5.3)
POTASSIUM SERPL-MCNC: 3 MMOL/L — LOW (ref 3.5–5.3)
POTASSIUM SERPL-SCNC: 2.6 MMOL/L — CRITICAL LOW (ref 3.5–5.3)
POTASSIUM SERPL-SCNC: 3 MMOL/L — LOW (ref 3.5–5.3)
PROT SERPL-MCNC: 5.5 G/DL — LOW (ref 6.6–8.7)
RBC # BLD: 2.77 M/UL — LOW (ref 3.8–5.2)
RBC # FLD: 23.8 % — HIGH (ref 10.3–14.5)
SODIUM SERPL-SCNC: 142 MMOL/L — SIGNIFICANT CHANGE UP (ref 135–145)
SODIUM SERPL-SCNC: 144 MMOL/L — SIGNIFICANT CHANGE UP (ref 135–145)
WBC # BLD: 6.51 K/UL — SIGNIFICANT CHANGE UP (ref 3.8–10.5)
WBC # FLD AUTO: 6.51 K/UL — SIGNIFICANT CHANGE UP (ref 3.8–10.5)

## 2025-02-08 PROCEDURE — 74178 CT ABD&PLV WO CNTR FLWD CNTR: CPT | Mod: 26

## 2025-02-08 PROCEDURE — 71045 X-RAY EXAM CHEST 1 VIEW: CPT | Mod: 26

## 2025-02-08 PROCEDURE — 93010 ELECTROCARDIOGRAM REPORT: CPT

## 2025-02-08 PROCEDURE — 99223 1ST HOSP IP/OBS HIGH 75: CPT

## 2025-02-08 RX ORDER — FLUOXETINE HYDROCHLORIDE 20 MG/1
80 CAPSULE ORAL AT BEDTIME
Refills: 0 | Status: DISCONTINUED | OUTPATIENT
Start: 2025-02-08 | End: 2025-02-16

## 2025-02-08 RX ORDER — MAGNESIUM, ALUMINUM HYDROXIDE 200-200 MG
30 TABLET,CHEWABLE ORAL EVERY 4 HOURS
Refills: 0 | Status: DISCONTINUED | OUTPATIENT
Start: 2025-02-08 | End: 2025-02-16

## 2025-02-08 RX ORDER — PREDNISONE 20 MG/1
20 TABLET ORAL DAILY
Refills: 0 | Status: DISCONTINUED | OUTPATIENT
Start: 2025-02-08 | End: 2025-02-16

## 2025-02-08 RX ORDER — METHADONE HCL 10 MG
5 TABLET ORAL
Refills: 0 | Status: COMPLETED | OUTPATIENT
Start: 2025-02-08 | End: 2025-02-15

## 2025-02-08 RX ORDER — HYDROMORPHONE/SOD CHLOR,ISO/PF 2 MG/10 ML
1 SYRINGE (ML) INJECTION ONCE
Refills: 0 | Status: DISCONTINUED | OUTPATIENT
Start: 2025-02-08 | End: 2025-02-08

## 2025-02-08 RX ORDER — METHADONE HCL 10 MG
10 TABLET ORAL AT BEDTIME
Refills: 0 | Status: COMPLETED | OUTPATIENT
Start: 2025-02-08 | End: 2025-02-15

## 2025-02-08 RX ORDER — CEFTRIAXONE 500 MG/1
2000 INJECTION, POWDER, FOR SOLUTION INTRAMUSCULAR; INTRAVENOUS ONCE
Refills: 0 | Status: DISCONTINUED | OUTPATIENT
Start: 2025-02-08 | End: 2025-02-08

## 2025-02-08 RX ORDER — ACETYLCYSTEINE 200 MG/ML
4 INHALANT RESPIRATORY (INHALATION) EVERY 6 HOURS
Refills: 0 | Status: DISCONTINUED | OUTPATIENT
Start: 2025-02-08 | End: 2025-02-16

## 2025-02-08 RX ORDER — HYDROMORPHONE/SOD CHLOR,ISO/PF 2 MG/10 ML
1 SYRINGE (ML) INJECTION ONCE
Refills: 0 | Status: DISCONTINUED | OUTPATIENT
Start: 2025-02-08 | End: 2025-02-09

## 2025-02-08 RX ORDER — ONDANSETRON HCL/PF 4 MG/2 ML
4 VIAL (ML) INJECTION ONCE
Refills: 0 | Status: COMPLETED | OUTPATIENT
Start: 2025-02-08 | End: 2025-02-08

## 2025-02-08 RX ORDER — BENZONATATE 100 MG
100 CAPSULE ORAL EVERY 8 HOURS
Refills: 0 | Status: DISCONTINUED | OUTPATIENT
Start: 2025-02-08 | End: 2025-02-16

## 2025-02-08 RX ORDER — ACETAMINOPHEN 500 MG/5ML
650 LIQUID (ML) ORAL EVERY 6 HOURS
Refills: 0 | Status: DISCONTINUED | OUTPATIENT
Start: 2025-02-08 | End: 2025-02-16

## 2025-02-08 RX ORDER — OLANZAPINE 10 MG/1
2.5 TABLET ORAL AT BEDTIME
Refills: 0 | Status: DISCONTINUED | OUTPATIENT
Start: 2025-02-08 | End: 2025-02-16

## 2025-02-08 RX ORDER — MEMANTINE HYDROCHLORIDE 21 MG/1
5 CAPSULE, EXTENDED RELEASE ORAL AT BEDTIME
Refills: 0 | Status: DISCONTINUED | OUTPATIENT
Start: 2025-02-08 | End: 2025-02-16

## 2025-02-08 RX ORDER — ONDANSETRON HCL/PF 4 MG/2 ML
4 VIAL (ML) INJECTION EVERY 8 HOURS
Refills: 0 | Status: DISCONTINUED | OUTPATIENT
Start: 2025-02-08 | End: 2025-02-16

## 2025-02-08 RX ORDER — CEFTRIAXONE 500 MG/1
2000 INJECTION, POWDER, FOR SOLUTION INTRAMUSCULAR; INTRAVENOUS ONCE
Refills: 0 | Status: COMPLETED | OUTPATIENT
Start: 2025-02-08 | End: 2025-02-08

## 2025-02-08 RX ORDER — MEMANTINE HYDROCHLORIDE 21 MG/1
10 CAPSULE, EXTENDED RELEASE ORAL
Refills: 0 | Status: DISCONTINUED | OUTPATIENT
Start: 2025-02-08 | End: 2025-02-16

## 2025-02-08 RX ORDER — NALOXONE HYDROCHLORIDE 0.4 MG/ML
0.1 INJECTION, SOLUTION INTRAMUSCULAR; INTRAVENOUS; SUBCUTANEOUS
Refills: 0 | Status: DISCONTINUED | OUTPATIENT
Start: 2025-02-08 | End: 2025-02-16

## 2025-02-08 RX ORDER — PREGABALIN 50 MG/1
200 CAPSULE ORAL EVERY 8 HOURS
Refills: 0 | Status: COMPLETED | OUTPATIENT
Start: 2025-02-08 | End: 2025-02-15

## 2025-02-08 RX ORDER — HYDROMORPHONE/SOD CHLOR,ISO/PF 2 MG/10 ML
2 SYRINGE (ML) INJECTION
Refills: 0 | Status: DISCONTINUED | OUTPATIENT
Start: 2025-02-08 | End: 2025-02-10

## 2025-02-08 RX ADMIN — OLANZAPINE 2.5 MILLIGRAM(S): 10 TABLET ORAL at 21:44

## 2025-02-08 RX ADMIN — Medication 2 MILLIGRAM(S): at 20:14

## 2025-02-08 RX ADMIN — Medication 1 MILLIGRAM(S): at 15:49

## 2025-02-08 RX ADMIN — Medication 2 MILLIGRAM(S): at 17:51

## 2025-02-08 RX ADMIN — Medication 100 MILLIEQUIVALENT(S): at 15:24

## 2025-02-08 RX ADMIN — Medication 1000 MILLILITER(S): at 12:35

## 2025-02-08 RX ADMIN — Medication 10 MILLIGRAM(S): at 21:44

## 2025-02-08 RX ADMIN — Medication 4 MILLIGRAM(S): at 15:59

## 2025-02-08 RX ADMIN — Medication 1 MILLIGRAM(S): at 14:36

## 2025-02-08 RX ADMIN — Medication 4 MILLIGRAM(S): at 23:02

## 2025-02-08 RX ADMIN — Medication 1 MILLIGRAM(S): at 15:20

## 2025-02-08 RX ADMIN — Medication 1 MILLIGRAM(S): at 14:10

## 2025-02-08 RX ADMIN — Medication 1 MILLIGRAM(S): at 12:35

## 2025-02-08 RX ADMIN — MEMANTINE HYDROCHLORIDE 5 MILLIGRAM(S): 21 CAPSULE, EXTENDED RELEASE ORAL at 21:43

## 2025-02-08 RX ADMIN — Medication 100 MILLIEQUIVALENT(S): at 17:51

## 2025-02-08 RX ADMIN — Medication 1000 MILLILITER(S): at 18:44

## 2025-02-08 RX ADMIN — Medication 1 MILLIGRAM(S): at 16:40

## 2025-02-08 RX ADMIN — CEFTRIAXONE 2000 MILLIGRAM(S): 500 INJECTION, POWDER, FOR SOLUTION INTRAMUSCULAR; INTRAVENOUS at 21:45

## 2025-02-08 RX ADMIN — Medication 2 MILLIGRAM(S): at 23:03

## 2025-02-08 RX ADMIN — FLUOXETINE HYDROCHLORIDE 80 MILLIGRAM(S): 20 CAPSULE ORAL at 21:42

## 2025-02-08 RX ADMIN — Medication 1 MILLIGRAM(S): at 13:11

## 2025-02-08 RX ADMIN — Medication 100 MILLIEQUIVALENT(S): at 16:12

## 2025-02-08 RX ADMIN — Medication 1 MILLIGRAM(S): at 13:05

## 2025-02-08 RX ADMIN — PREGABALIN 200 MILLIGRAM(S): 50 CAPSULE ORAL at 21:42

## 2025-02-09 LAB
ALBUMIN SERPL ELPH-MCNC: 2.8 G/DL — LOW (ref 3.3–5.2)
ALP SERPL-CCNC: 62 U/L — SIGNIFICANT CHANGE UP (ref 40–120)
ALT FLD-CCNC: 8 U/L — SIGNIFICANT CHANGE UP
ANION GAP SERPL CALC-SCNC: 8 MMOL/L — SIGNIFICANT CHANGE UP (ref 5–17)
ANION GAP SERPL CALC-SCNC: 9 MMOL/L — SIGNIFICANT CHANGE UP (ref 5–17)
AST SERPL-CCNC: 24 U/L — SIGNIFICANT CHANGE UP
BASOPHILS # BLD AUTO: 0.01 K/UL — SIGNIFICANT CHANGE UP (ref 0–0.2)
BASOPHILS NFR BLD AUTO: 0.2 % — SIGNIFICANT CHANGE UP (ref 0–2)
BILIRUB SERPL-MCNC: 0.6 MG/DL — SIGNIFICANT CHANGE UP (ref 0.4–2)
BUN SERPL-MCNC: 6.4 MG/DL — LOW (ref 8–20)
BUN SERPL-MCNC: 8.7 MG/DL — SIGNIFICANT CHANGE UP (ref 8–20)
CALCIUM SERPL-MCNC: 6.7 MG/DL — LOW (ref 8.4–10.5)
CALCIUM SERPL-MCNC: 8.1 MG/DL — LOW (ref 8.4–10.5)
CHLORIDE SERPL-SCNC: 107 MMOL/L — SIGNIFICANT CHANGE UP (ref 96–108)
CHLORIDE SERPL-SCNC: 112 MMOL/L — HIGH (ref 96–108)
CO2 SERPL-SCNC: 16 MMOL/L — LOW (ref 22–29)
CO2 SERPL-SCNC: 25 MMOL/L — SIGNIFICANT CHANGE UP (ref 22–29)
CREAT SERPL-MCNC: 0.38 MG/DL — LOW (ref 0.5–1.3)
CREAT SERPL-MCNC: 0.47 MG/DL — LOW (ref 0.5–1.3)
EGFR: 125 ML/MIN/1.73M2 — SIGNIFICANT CHANGE UP
EGFR: 125 ML/MIN/1.73M2 — SIGNIFICANT CHANGE UP
EGFR: 131 ML/MIN/1.73M2 — SIGNIFICANT CHANGE UP
EGFR: 131 ML/MIN/1.73M2 — SIGNIFICANT CHANGE UP
EOSINOPHIL # BLD AUTO: 0.15 K/UL — SIGNIFICANT CHANGE UP (ref 0–0.5)
EOSINOPHIL NFR BLD AUTO: 2.7 % — SIGNIFICANT CHANGE UP (ref 0–6)
GAS PNL BLDV: SIGNIFICANT CHANGE UP
GLUCOSE SERPL-MCNC: 113 MG/DL — HIGH (ref 70–99)
GLUCOSE SERPL-MCNC: 77 MG/DL — SIGNIFICANT CHANGE UP (ref 70–99)
HCT VFR BLD CALC: 23.7 % — LOW (ref 34.5–45)
HCT VFR BLD CALC: 27.4 % — LOW (ref 34.5–45)
HGB BLD-MCNC: 7.4 G/DL — LOW (ref 11.5–15.5)
HGB BLD-MCNC: 8.5 G/DL — LOW (ref 11.5–15.5)
IMM GRANULOCYTES NFR BLD AUTO: 4.7 % — HIGH (ref 0–0.9)
LYMPHOCYTES # BLD AUTO: 0.76 K/UL — LOW (ref 1–3.3)
LYMPHOCYTES # BLD AUTO: 13.7 % — SIGNIFICANT CHANGE UP (ref 13–44)
MAGNESIUM SERPL-MCNC: 1.4 MG/DL — LOW (ref 1.6–2.6)
MCHC RBC-ENTMCNC: 30.5 PG — SIGNIFICANT CHANGE UP (ref 27–34)
MCHC RBC-ENTMCNC: 30.6 PG — SIGNIFICANT CHANGE UP (ref 27–34)
MCHC RBC-ENTMCNC: 31 G/DL — LOW (ref 32–36)
MCHC RBC-ENTMCNC: 31.2 G/DL — LOW (ref 32–36)
MCV RBC AUTO: 97.9 FL — SIGNIFICANT CHANGE UP (ref 80–100)
MCV RBC AUTO: 98.2 FL — SIGNIFICANT CHANGE UP (ref 80–100)
MONOCYTES # BLD AUTO: 0.62 K/UL — SIGNIFICANT CHANGE UP (ref 0–0.9)
MONOCYTES NFR BLD AUTO: 11.2 % — SIGNIFICANT CHANGE UP (ref 2–14)
NEUTROPHILS # BLD AUTO: 3.76 K/UL — SIGNIFICANT CHANGE UP (ref 1.8–7.4)
NEUTROPHILS NFR BLD AUTO: 67.5 % — SIGNIFICANT CHANGE UP (ref 43–77)
NRBC # BLD: 1 /100 WBCS — HIGH (ref 0–0)
NRBC # BLD: 1 /100 WBCS — HIGH (ref 0–0)
NRBC BLD-RTO: 1 /100 WBCS — HIGH (ref 0–0)
NRBC BLD-RTO: 1 /100 WBCS — HIGH (ref 0–0)
PLATELET # BLD AUTO: 126 K/UL — LOW (ref 150–400)
PLATELET # BLD AUTO: 134 K/UL — LOW (ref 150–400)
POTASSIUM SERPL-MCNC: 2.8 MMOL/L — CRITICAL LOW (ref 3.5–5.3)
POTASSIUM SERPL-MCNC: 3.4 MMOL/L — LOW (ref 3.5–5.3)
POTASSIUM SERPL-SCNC: 2.8 MMOL/L — CRITICAL LOW (ref 3.5–5.3)
POTASSIUM SERPL-SCNC: 3.4 MMOL/L — LOW (ref 3.5–5.3)
PROT SERPL-MCNC: 4.9 G/DL — LOW (ref 6.6–8.7)
RBC # BLD: 2.42 M/UL — LOW (ref 3.8–5.2)
RBC # BLD: 2.79 M/UL — LOW (ref 3.8–5.2)
RBC # FLD: 23.9 % — HIGH (ref 10.3–14.5)
RBC # FLD: 24.1 % — HIGH (ref 10.3–14.5)
SODIUM SERPL-SCNC: 137 MMOL/L — SIGNIFICANT CHANGE UP (ref 135–145)
SODIUM SERPL-SCNC: 140 MMOL/L — SIGNIFICANT CHANGE UP (ref 135–145)
WBC # BLD: 5.56 K/UL — SIGNIFICANT CHANGE UP (ref 3.8–10.5)
WBC # BLD: 5.61 K/UL — SIGNIFICANT CHANGE UP (ref 3.8–10.5)
WBC # FLD AUTO: 5.56 K/UL — SIGNIFICANT CHANGE UP (ref 3.8–10.5)
WBC # FLD AUTO: 5.61 K/UL — SIGNIFICANT CHANGE UP (ref 3.8–10.5)

## 2025-02-09 PROCEDURE — 93010 ELECTROCARDIOGRAM REPORT: CPT

## 2025-02-09 PROCEDURE — 99233 SBSQ HOSP IP/OBS HIGH 50: CPT

## 2025-02-09 RX ORDER — MAGNESIUM SULFATE 500 MG/ML
2 SYRINGE (ML) INJECTION ONCE
Refills: 0 | Status: COMPLETED | OUTPATIENT
Start: 2025-02-09 | End: 2025-02-09

## 2025-02-09 RX ORDER — SODIUM CHLORIDE 9 G/1000ML
1000 INJECTION, SOLUTION INTRAVENOUS ONCE
Refills: 0 | Status: COMPLETED | OUTPATIENT
Start: 2025-02-09 | End: 2025-02-09

## 2025-02-09 RX ORDER — DIPHENHYDRAMINE HCL 12.5MG/5ML
25 ELIXIR ORAL ONCE
Refills: 0 | Status: COMPLETED | OUTPATIENT
Start: 2025-02-09 | End: 2025-02-09

## 2025-02-09 RX ORDER — HYDROMORPHONE/SOD CHLOR,ISO/PF 2 MG/10 ML
1 SYRINGE (ML) INJECTION ONCE
Refills: 0 | Status: DISCONTINUED | OUTPATIENT
Start: 2025-02-09 | End: 2025-02-09

## 2025-02-09 RX ADMIN — Medication 100 MILLIEQUIVALENT(S): at 02:53

## 2025-02-09 RX ADMIN — Medication 2 MILLIGRAM(S): at 17:00

## 2025-02-09 RX ADMIN — OLANZAPINE 2.5 MILLIGRAM(S): 10 TABLET ORAL at 22:46

## 2025-02-09 RX ADMIN — PREGABALIN 200 MILLIGRAM(S): 50 CAPSULE ORAL at 22:45

## 2025-02-09 RX ADMIN — Medication 2 MILLIGRAM(S): at 22:45

## 2025-02-09 RX ADMIN — Medication 2 MILLIGRAM(S): at 10:49

## 2025-02-09 RX ADMIN — Medication 2 MILLIGRAM(S): at 13:58

## 2025-02-09 RX ADMIN — Medication 100 MILLIEQUIVALENT(S): at 00:46

## 2025-02-09 RX ADMIN — Medication 100 MILLIEQUIVALENT(S): at 10:02

## 2025-02-09 RX ADMIN — Medication 100 MILLIEQUIVALENT(S): at 08:42

## 2025-02-09 RX ADMIN — Medication 5 MILLIGRAM(S): at 14:34

## 2025-02-09 RX ADMIN — MEMANTINE HYDROCHLORIDE 5 MILLIGRAM(S): 21 CAPSULE, EXTENDED RELEASE ORAL at 22:45

## 2025-02-09 RX ADMIN — FLUOXETINE HYDROCHLORIDE 80 MILLIGRAM(S): 20 CAPSULE ORAL at 22:46

## 2025-02-09 RX ADMIN — Medication 100 MILLIEQUIVALENT(S): at 01:55

## 2025-02-09 RX ADMIN — Medication 10 MILLIGRAM(S): at 22:46

## 2025-02-09 RX ADMIN — PREDNISONE 20 MILLIGRAM(S): 20 TABLET ORAL at 06:56

## 2025-02-09 RX ADMIN — MEMANTINE HYDROCHLORIDE 10 MILLIGRAM(S): 21 CAPSULE, EXTENDED RELEASE ORAL at 08:42

## 2025-02-09 RX ADMIN — Medication 5 MILLIGRAM(S): at 06:56

## 2025-02-09 RX ADMIN — Medication 2 MILLIGRAM(S): at 02:52

## 2025-02-09 RX ADMIN — Medication 2 MILLIGRAM(S): at 08:42

## 2025-02-09 RX ADMIN — PREGABALIN 200 MILLIGRAM(S): 50 CAPSULE ORAL at 06:56

## 2025-02-09 RX ADMIN — Medication 1 MILLIGRAM(S): at 11:46

## 2025-02-09 RX ADMIN — Medication 2 MILLIGRAM(S): at 05:39

## 2025-02-09 RX ADMIN — Medication 125 MILLILITER(S): at 00:46

## 2025-02-09 RX ADMIN — Medication 25 MILLIGRAM(S): at 20:12

## 2025-02-09 RX ADMIN — Medication 40 MILLIEQUIVALENT(S): at 08:48

## 2025-02-09 RX ADMIN — Medication 100 MILLIEQUIVALENT(S): at 11:13

## 2025-02-09 RX ADMIN — Medication 2 MILLIGRAM(S): at 13:38

## 2025-02-09 RX ADMIN — Medication 2 MILLIGRAM(S): at 20:13

## 2025-02-09 RX ADMIN — Medication 2 MILLIGRAM(S): at 18:11

## 2025-02-09 RX ADMIN — Medication 1 MILLIGRAM(S): at 00:46

## 2025-02-09 RX ADMIN — SODIUM CHLORIDE 1000 MILLILITER(S): 9 INJECTION, SOLUTION INTRAVENOUS at 17:33

## 2025-02-09 RX ADMIN — Medication 25 GRAM(S): at 12:58

## 2025-02-09 RX ADMIN — Medication 2 MILLIGRAM(S): at 16:05

## 2025-02-09 RX ADMIN — PREGABALIN 200 MILLIGRAM(S): 50 CAPSULE ORAL at 14:34

## 2025-02-10 VITALS
TEMPERATURE: 99 F | DIASTOLIC BLOOD PRESSURE: 78 MMHG | RESPIRATION RATE: 18 BRPM | SYSTOLIC BLOOD PRESSURE: 124 MMHG | OXYGEN SATURATION: 98 % | HEART RATE: 95 BPM

## 2025-02-10 LAB
ANION GAP SERPL CALC-SCNC: 9 MMOL/L — SIGNIFICANT CHANGE UP (ref 5–17)
BUN SERPL-MCNC: 7.7 MG/DL — LOW (ref 8–20)
CALCIUM SERPL-MCNC: 8 MG/DL — LOW (ref 8.4–10.5)
CHLORIDE SERPL-SCNC: 107 MMOL/L — SIGNIFICANT CHANGE UP (ref 96–108)
CO2 SERPL-SCNC: 22 MMOL/L — SIGNIFICANT CHANGE UP (ref 22–29)
CREAT SERPL-MCNC: 0.56 MG/DL — SIGNIFICANT CHANGE UP (ref 0.5–1.3)
EGFR: 120 ML/MIN/1.73M2 — SIGNIFICANT CHANGE UP
EGFR: 120 ML/MIN/1.73M2 — SIGNIFICANT CHANGE UP
GI PCR PANEL: ABNORMAL
GLUCOSE SERPL-MCNC: 106 MG/DL — HIGH (ref 70–99)
HCT VFR BLD CALC: 25.4 % — LOW (ref 34.5–45)
HGB BLD-MCNC: 7.8 G/DL — LOW (ref 11.5–15.5)
MCHC RBC-ENTMCNC: 30.2 PG — SIGNIFICANT CHANGE UP (ref 27–34)
MCHC RBC-ENTMCNC: 30.7 G/DL — LOW (ref 32–36)
MCV RBC AUTO: 98.4 FL — SIGNIFICANT CHANGE UP (ref 80–100)
NOROVIRUS GI+II RNA STL QL NAA+NON-PROBE: ABNORMAL
NRBC # BLD: 2 /100 WBCS — HIGH (ref 0–0)
NRBC BLD-RTO: 2 /100 WBCS — HIGH (ref 0–0)
PLATELET # BLD AUTO: 155 K/UL — SIGNIFICANT CHANGE UP (ref 150–400)
POTASSIUM SERPL-MCNC: 3.7 MMOL/L — SIGNIFICANT CHANGE UP (ref 3.5–5.3)
POTASSIUM SERPL-SCNC: 3.7 MMOL/L — SIGNIFICANT CHANGE UP (ref 3.5–5.3)
RBC # BLD: 2.58 M/UL — LOW (ref 3.8–5.2)
RBC # FLD: 24.9 % — HIGH (ref 10.3–14.5)
SODIUM SERPL-SCNC: 138 MMOL/L — SIGNIFICANT CHANGE UP (ref 135–145)
WBC # BLD: 8.26 K/UL — SIGNIFICANT CHANGE UP (ref 3.8–10.5)
WBC # FLD AUTO: 8.26 K/UL — SIGNIFICANT CHANGE UP (ref 3.8–10.5)

## 2025-02-10 PROCEDURE — P9016: CPT

## 2025-02-10 PROCEDURE — 85027 COMPLETE CBC AUTOMATED: CPT

## 2025-02-10 PROCEDURE — 74178 CT ABD&PLV WO CNTR FLWD CNTR: CPT | Mod: MC

## 2025-02-10 PROCEDURE — 93010 ELECTROCARDIOGRAM REPORT: CPT

## 2025-02-10 PROCEDURE — 85018 HEMOGLOBIN: CPT

## 2025-02-10 PROCEDURE — 96375 TX/PRO/DX INJ NEW DRUG ADDON: CPT

## 2025-02-10 PROCEDURE — 86850 RBC ANTIBODY SCREEN: CPT

## 2025-02-10 PROCEDURE — 94640 AIRWAY INHALATION TREATMENT: CPT

## 2025-02-10 PROCEDURE — 87507 IADNA-DNA/RNA PROBE TQ 12-25: CPT

## 2025-02-10 PROCEDURE — G0378: CPT

## 2025-02-10 PROCEDURE — 96376 TX/PRO/DX INJ SAME DRUG ADON: CPT

## 2025-02-10 PROCEDURE — 36430 TRANSFUSION BLD/BLD COMPNT: CPT

## 2025-02-10 PROCEDURE — 85025 COMPLETE CBC W/AUTO DIFF WBC: CPT

## 2025-02-10 PROCEDURE — 82435 ASSAY OF BLOOD CHLORIDE: CPT

## 2025-02-10 PROCEDURE — 86901 BLOOD TYPING SEROLOGIC RH(D): CPT

## 2025-02-10 PROCEDURE — 80053 COMPREHEN METABOLIC PANEL: CPT

## 2025-02-10 PROCEDURE — 93005 ELECTROCARDIOGRAM TRACING: CPT

## 2025-02-10 PROCEDURE — 99284 EMERGENCY DEPT VISIT MOD MDM: CPT | Mod: 25

## 2025-02-10 PROCEDURE — 83690 ASSAY OF LIPASE: CPT

## 2025-02-10 PROCEDURE — 83735 ASSAY OF MAGNESIUM: CPT

## 2025-02-10 PROCEDURE — 36415 COLL VENOUS BLD VENIPUNCTURE: CPT

## 2025-02-10 PROCEDURE — 82803 BLOOD GASES ANY COMBINATION: CPT

## 2025-02-10 PROCEDURE — 96374 THER/PROPH/DIAG INJ IV PUSH: CPT

## 2025-02-10 PROCEDURE — 85014 HEMATOCRIT: CPT

## 2025-02-10 PROCEDURE — 80048 BASIC METABOLIC PNL TOTAL CA: CPT

## 2025-02-10 PROCEDURE — 82330 ASSAY OF CALCIUM: CPT

## 2025-02-10 PROCEDURE — 71045 X-RAY EXAM CHEST 1 VIEW: CPT

## 2025-02-10 PROCEDURE — 82947 ASSAY GLUCOSE BLOOD QUANT: CPT

## 2025-02-10 PROCEDURE — 99282 EMERGENCY DEPT VISIT SF MDM: CPT

## 2025-02-10 PROCEDURE — 86900 BLOOD TYPING SEROLOGIC ABO: CPT

## 2025-02-10 PROCEDURE — 84132 ASSAY OF SERUM POTASSIUM: CPT

## 2025-02-10 PROCEDURE — 86922 COMPATIBILITY TEST ANTIGLOB: CPT

## 2025-02-10 PROCEDURE — 83605 ASSAY OF LACTIC ACID: CPT

## 2025-02-10 PROCEDURE — 99239 HOSP IP/OBS DSCHRG MGMT >30: CPT

## 2025-02-10 PROCEDURE — 84295 ASSAY OF SERUM SODIUM: CPT

## 2025-02-10 RX ORDER — DIPHENHYDRAMINE HCL 12.5MG/5ML
25 ELIXIR ORAL ONCE
Refills: 0 | Status: COMPLETED | OUTPATIENT
Start: 2025-02-10 | End: 2025-02-10

## 2025-02-10 RX ADMIN — Medication 2 MILLIGRAM(S): at 14:38

## 2025-02-10 RX ADMIN — Medication 2 MILLIGRAM(S): at 09:13

## 2025-02-10 RX ADMIN — ACETYLCYSTEINE 4 MILLILITER(S): 200 INHALANT RESPIRATORY (INHALATION) at 08:15

## 2025-02-10 RX ADMIN — PREGABALIN 200 MILLIGRAM(S): 50 CAPSULE ORAL at 14:38

## 2025-02-10 RX ADMIN — Medication 2 MILLIGRAM(S): at 12:22

## 2025-02-10 RX ADMIN — Medication 5 MILLIGRAM(S): at 14:34

## 2025-02-10 RX ADMIN — Medication 25 MILLIGRAM(S): at 06:46

## 2025-02-10 RX ADMIN — Medication 2 MILLIGRAM(S): at 04:31

## 2025-02-10 RX ADMIN — Medication 5 MILLIGRAM(S): at 06:46

## 2025-02-10 RX ADMIN — Medication 2 MILLIGRAM(S): at 16:46

## 2025-02-10 RX ADMIN — PREDNISONE 20 MILLIGRAM(S): 20 TABLET ORAL at 06:46

## 2025-02-10 RX ADMIN — PREGABALIN 200 MILLIGRAM(S): 50 CAPSULE ORAL at 06:46

## 2025-02-10 RX ADMIN — Medication 2 MILLIGRAM(S): at 00:48

## 2025-02-10 RX ADMIN — Medication 40 MILLIGRAM(S): at 08:23

## 2025-02-10 RX ADMIN — MEMANTINE HYDROCHLORIDE 10 MILLIGRAM(S): 21 CAPSULE, EXTENDED RELEASE ORAL at 08:15

## 2025-02-10 RX ADMIN — Medication 2 MILLIGRAM(S): at 06:46

## 2025-02-18 NOTE — ED PROVIDER NOTE - IV ALTEPLASE INCLUSION HIDDEN
Left message for patient to call back.  
Left message for patient to call back.  
Left message for patient to call back.    Letter sent  
Pls call pt    Liver US is abnormal, no change from last one    Due to elevated labs, pls see Hepatology now for consult  
Relayed results and message to patient.   He verbalized understanding.  
show

## 2025-03-03 ENCOUNTER — EMERGENCY (EMERGENCY)
Facility: HOSPITAL | Age: 40
LOS: 1 days | Discharge: HOME | End: 2025-03-03
Attending: EMERGENCY MEDICINE
Payer: MEDICARE

## 2025-03-03 VITALS
OXYGEN SATURATION: 100 % | HEART RATE: 104 BPM | DIASTOLIC BLOOD PRESSURE: 70 MMHG | SYSTOLIC BLOOD PRESSURE: 108 MMHG | RESPIRATION RATE: 20 BRPM | HEIGHT: 62 IN | WEIGHT: 145.06 LBS | TEMPERATURE: 98 F

## 2025-03-03 DIAGNOSIS — Z98.890 OTHER SPECIFIED POSTPROCEDURAL STATES: Chronic | ICD-10-CM

## 2025-03-03 DIAGNOSIS — Z90.89 ACQUIRED ABSENCE OF OTHER ORGANS: Chronic | ICD-10-CM

## 2025-03-03 LAB
ANION GAP SERPL CALC-SCNC: 9 MMOL/L — SIGNIFICANT CHANGE UP (ref 5–17)
ANISOCYTOSIS BLD QL: SLIGHT — SIGNIFICANT CHANGE UP
BASOPHILS # BLD AUTO: 0.01 K/UL — SIGNIFICANT CHANGE UP (ref 0–0.2)
BASOPHILS # BLD MANUAL: 0.07 K/UL — SIGNIFICANT CHANGE UP (ref 0–0.2)
BASOPHILS NFR BLD AUTO: 0.3 % — SIGNIFICANT CHANGE UP (ref 0–2)
BASOPHILS NFR BLD MANUAL: 1.7 % — SIGNIFICANT CHANGE UP (ref 0–2)
BLD GP AB SCN SERPL QL: SIGNIFICANT CHANGE UP
BUN SERPL-MCNC: 13.9 MG/DL — SIGNIFICANT CHANGE UP (ref 8–20)
CALCIUM SERPL-MCNC: 8.2 MG/DL — LOW (ref 8.4–10.5)
CHLORIDE SERPL-SCNC: 109 MMOL/L — HIGH (ref 96–108)
CO2 SERPL-SCNC: 24 MMOL/L — SIGNIFICANT CHANGE UP (ref 22–29)
CREAT SERPL-MCNC: 0.5 MG/DL — SIGNIFICANT CHANGE UP (ref 0.5–1.3)
DACRYOCYTES BLD QL SMEAR: SLIGHT — SIGNIFICANT CHANGE UP
EGFR: 123 ML/MIN/1.73M2 — SIGNIFICANT CHANGE UP
EOSINOPHIL # BLD AUTO: 0.07 K/UL — SIGNIFICANT CHANGE UP (ref 0–0.5)
EOSINOPHIL # BLD MANUAL: 0 K/UL — SIGNIFICANT CHANGE UP (ref 0–0.5)
EOSINOPHIL NFR BLD AUTO: 1.8 % — SIGNIFICANT CHANGE UP (ref 0–6)
EOSINOPHIL NFR BLD MANUAL: 0 % — SIGNIFICANT CHANGE UP (ref 0–6)
GIANT PLATELETS BLD QL SMEAR: PRESENT
GLUCOSE SERPL-MCNC: 96 MG/DL — SIGNIFICANT CHANGE UP (ref 70–99)
HCT VFR BLD CALC: 20.7 % — CRITICAL LOW (ref 34.5–45)
HGB BLD-MCNC: 5.8 G/DL — CRITICAL LOW (ref 11.5–15.5)
HYPOCHROMIA BLD QL: SIGNIFICANT CHANGE UP
IMM GRANULOCYTES # BLD AUTO: 0.04 K/UL — SIGNIFICANT CHANGE UP (ref 0–0.07)
IMM GRANULOCYTES NFR BLD AUTO: 1 % — HIGH (ref 0–0.9)
LYMPHOCYTES # BLD AUTO: 0.73 K/UL — LOW (ref 1–3.3)
LYMPHOCYTES # BLD MANUAL: 0.37 K/UL — LOW (ref 1–3.3)
LYMPHOCYTES NFR BLD AUTO: 18.5 % — SIGNIFICANT CHANGE UP (ref 13–44)
LYMPHOCYTES NFR BLD MANUAL: 9.4 % — LOW (ref 13–44)
MACROCYTES BLD QL: SLIGHT — SIGNIFICANT CHANGE UP
MANUAL NEUTROPHIL BANDS #: 0.04 K/UL — SIGNIFICANT CHANGE UP (ref 0–0.84)
MANUAL REACTIVE LYMPHOCYTES #: 0.1 K/UL — SIGNIFICANT CHANGE UP (ref 0–0.63)
MCHC RBC-ENTMCNC: 28 G/DL — LOW (ref 32–36)
MCHC RBC-ENTMCNC: 28.9 PG — SIGNIFICANT CHANGE UP (ref 27–34)
MCV RBC AUTO: 103 FL — HIGH (ref 80–100)
MONOCYTES # BLD AUTO: 0.23 K/UL — SIGNIFICANT CHANGE UP (ref 0–0.9)
MONOCYTES # BLD MANUAL: 0.2 K/UL — SIGNIFICANT CHANGE UP (ref 0–0.9)
MONOCYTES NFR BLD AUTO: 5.8 % — SIGNIFICANT CHANGE UP (ref 2–14)
MONOCYTES NFR BLD MANUAL: 5.1 % — SIGNIFICANT CHANGE UP (ref 2–14)
NEUTROPHILS # BLD AUTO: 2.86 K/UL — SIGNIFICANT CHANGE UP (ref 1.8–7.4)
NEUTROPHILS # BLD MANUAL: 3.16 K/UL — SIGNIFICANT CHANGE UP (ref 1.8–7.4)
NEUTROPHILS NFR BLD AUTO: 72.6 % — SIGNIFICANT CHANGE UP (ref 43–77)
NEUTROPHILS NFR BLD MANUAL: 80.3 % — HIGH (ref 43–77)
NEUTS BAND # BLD: 0.9 % — SIGNIFICANT CHANGE UP (ref 0–8)
NEUTS BAND NFR BLD: 0.9 % — SIGNIFICANT CHANGE UP (ref 0–8)
NRBC # BLD AUTO: 0.03 K/UL — HIGH (ref 0–0)
NRBC # FLD: 0.03 K/UL — HIGH (ref 0–0)
NRBC BLD AUTO-RTO: 0 /100 WBCS — SIGNIFICANT CHANGE UP (ref 0–0)
OVALOCYTES BLD QL SMEAR: ABNORMAL
PLAT MORPH BLD: NORMAL — SIGNIFICANT CHANGE UP
PLATELET # BLD AUTO: 161 K/UL — SIGNIFICANT CHANGE UP (ref 150–400)
PMV BLD: 11.9 FL — SIGNIFICANT CHANGE UP (ref 7–13)
POIKILOCYTOSIS BLD QL AUTO: ABNORMAL
POLYCHROMASIA BLD QL SMEAR: ABNORMAL
POTASSIUM SERPL-MCNC: 4 MMOL/L — SIGNIFICANT CHANGE UP (ref 3.5–5.3)
POTASSIUM SERPL-SCNC: 4 MMOL/L — SIGNIFICANT CHANGE UP (ref 3.5–5.3)
RBC # BLD: 2.01 M/UL — LOW (ref 3.8–5.2)
RBC # FLD: 19.5 % — HIGH (ref 10.3–14.5)
RBC BLD AUTO: ABNORMAL
SODIUM SERPL-SCNC: 141 MMOL/L — SIGNIFICANT CHANGE UP (ref 135–145)
TARGETS BLD QL SMEAR: SLIGHT — SIGNIFICANT CHANGE UP
VARIANT LYMPHS # BLD: 2.6 % — SIGNIFICANT CHANGE UP (ref 0–6)
VARIANT LYMPHS NFR BLD MANUAL: 2.6 % — SIGNIFICANT CHANGE UP (ref 0–6)
WBC # BLD: 3.97 K/UL — SIGNIFICANT CHANGE UP (ref 3.8–10.5)
WBC # FLD AUTO: 3.97 K/UL — SIGNIFICANT CHANGE UP (ref 3.8–10.5)

## 2025-03-03 PROCEDURE — 36410 VNPNXR 3YR/> PHY/QHP DX/THER: CPT

## 2025-03-03 PROCEDURE — 99223 1ST HOSP IP/OBS HIGH 75: CPT

## 2025-03-03 RX ORDER — PREGABALIN 50 MG/1
200 CAPSULE ORAL EVERY 8 HOURS
Refills: 0 | Status: COMPLETED | OUTPATIENT
Start: 2025-03-03 | End: 2025-03-10

## 2025-03-03 RX ORDER — HYDROMORPHONE/SOD CHLOR,ISO/PF 2 MG/10 ML
1 SYRINGE (ML) INJECTION ONCE
Refills: 0 | Status: DISCONTINUED | OUTPATIENT
Start: 2025-03-03 | End: 2025-03-03

## 2025-03-03 RX ORDER — METHADONE HCL 10 MG
10 TABLET ORAL AT BEDTIME
Refills: 0 | Status: COMPLETED | OUTPATIENT
Start: 2025-03-03 | End: 2025-03-10

## 2025-03-03 RX ORDER — ONDANSETRON HCL/PF 4 MG/2 ML
4 VIAL (ML) INJECTION ONCE
Refills: 0 | Status: COMPLETED | OUTPATIENT
Start: 2025-03-03 | End: 2025-03-03

## 2025-03-03 RX ORDER — AMINOCAPROIC ACID 0.25 G/ML
3.5 SOLUTION ORAL EVERY 6 HOURS
Refills: 0 | Status: DISCONTINUED | OUTPATIENT
Start: 2025-03-03 | End: 2025-03-03

## 2025-03-03 RX ORDER — METHYLPHENIDATE HCL 20 MG
10 TABLET, EXTENDED RELEASE ORAL AT BEDTIME
Refills: 0 | Status: COMPLETED | OUTPATIENT
Start: 2025-03-03 | End: 2025-03-10

## 2025-03-03 RX ORDER — HYDROMORPHONE/SOD CHLOR,ISO/PF 2 MG/10 ML
2 SYRINGE (ML) INJECTION
Refills: 0 | Status: DISCONTINUED | OUTPATIENT
Start: 2025-03-03 | End: 2025-03-05

## 2025-03-03 RX ORDER — AMINOCAPROIC ACID 0.25 G/ML
3.5 SOLUTION ORAL EVERY 6 HOURS
Refills: 0 | Status: DISCONTINUED | OUTPATIENT
Start: 2025-03-03 | End: 2025-03-11

## 2025-03-03 RX ORDER — METHYLPHENIDATE HCL 20 MG
20 TABLET, EXTENDED RELEASE ORAL DAILY
Refills: 0 | Status: COMPLETED | OUTPATIENT
Start: 2025-03-03 | End: 2025-03-10

## 2025-03-03 RX ORDER — DIAZEPAM 2 MG/1
1 TABLET ORAL
Refills: 0 | DISCHARGE

## 2025-03-03 RX ORDER — DIAZEPAM 2 MG/1
2 TABLET ORAL EVERY 8 HOURS
Refills: 0 | Status: DISCONTINUED | OUTPATIENT
Start: 2025-03-03 | End: 2025-03-04

## 2025-03-03 RX ORDER — OLANZAPINE 10 MG/1
2.5 TABLET ORAL AT BEDTIME
Refills: 0 | Status: DISCONTINUED | OUTPATIENT
Start: 2025-03-03 | End: 2025-03-11

## 2025-03-03 RX ORDER — MELATONIN 5 MG
5 TABLET ORAL AT BEDTIME
Refills: 0 | Status: DISCONTINUED | OUTPATIENT
Start: 2025-03-03 | End: 2025-03-11

## 2025-03-03 RX ORDER — FLUOXETINE HYDROCHLORIDE 20 MG/1
80 CAPSULE ORAL AT BEDTIME
Refills: 0 | Status: DISCONTINUED | OUTPATIENT
Start: 2025-03-03 | End: 2025-03-11

## 2025-03-03 RX ORDER — MEMANTINE HYDROCHLORIDE 21 MG/1
10 CAPSULE, EXTENDED RELEASE ORAL
Refills: 0 | Status: DISCONTINUED | OUTPATIENT
Start: 2025-03-03 | End: 2025-03-11

## 2025-03-03 RX ORDER — METHADONE HCL 10 MG
5 TABLET ORAL DAILY
Refills: 0 | Status: COMPLETED | OUTPATIENT
Start: 2025-03-03 | End: 2025-03-10

## 2025-03-03 RX ADMIN — Medication 1 MILLIGRAM(S): at 20:27

## 2025-03-03 RX ADMIN — Medication 4 MILLIGRAM(S): at 20:26

## 2025-03-03 RX ADMIN — AMINOCAPROIC ACID 3.5 GRAM(S): 0.25 SOLUTION ORAL at 23:24

## 2025-03-03 RX ADMIN — Medication 10 MILLIGRAM(S): at 22:24

## 2025-03-03 RX ADMIN — Medication 2 MILLIGRAM(S): at 18:25

## 2025-03-03 RX ADMIN — Medication 5 MILLIGRAM(S): at 22:24

## 2025-03-03 RX ADMIN — Medication 2 MILLIGRAM(S): at 23:45

## 2025-03-03 RX ADMIN — DIAZEPAM 2 MILLIGRAM(S): 2 TABLET ORAL at 23:21

## 2025-03-03 RX ADMIN — PREGABALIN 200 MILLIGRAM(S): 50 CAPSULE ORAL at 22:24

## 2025-03-03 RX ADMIN — OLANZAPINE 2.5 MILLIGRAM(S): 10 TABLET ORAL at 22:24

## 2025-03-03 RX ADMIN — FLUOXETINE HYDROCHLORIDE 80 MILLIGRAM(S): 20 CAPSULE ORAL at 22:25

## 2025-03-03 NOTE — ED PROVIDER NOTE - PHYSICAL EXAMINATION
Detail Level: Zone Detail Level: Simple Detail Level: Generalized Detail Level: Detailed Const: Awake, alert and oriented. In no acute distress. Chronically ill appearing.  HEENT: NC/AT. Moist mucous membranes.  Eyes: No scleral icterus. EOMI.  Neck:. Soft and supple. Full ROM without pain.  Cardiac: Regular rate and regular rhythm. +S1/S2. No murmurs. Peripheral pulses 2+ and symmetric. No LE edema.  Resp: Speaking in full sentences. No evidence of respiratory distress. No wheezes, rales or rhonchi.  Abd: Soft, non-tender, non-distended. Normal bowel sounds in all 4 quadrants. No guarding or rebound.  Back: Spine midline and non-tender. No CVAT.  Skin: No rashes, abrasions or lacerations.  Neuro: Awake, alert & oriented x 3. Moves all extremities symmetrically.

## 2025-03-03 NOTE — ED PROVIDER NOTE - OBJECTIVE STATEMENT
40 y/o F with PMH metastatic breast Ca, chronic anemia/GI bleed requiring multiple transfusions presents for hgb 6.8. She notes some rectal bleeding that is consistent with her usual rectal bleeding. She notes some baseline chronic pain. She denies fevers, nausea, vomiting.

## 2025-03-03 NOTE — ED CDU PROVIDER INITIAL DAY NOTE - PHYSICAL EXAMINATION
Const: Awake, alert and oriented. In no acute distress. Chronically ill appearing.  HEENT: NC/AT. Moist mucous membranes.  Eyes: No scleral icterus. EOMI.  Neck:. Soft and supple. Full ROM without pain.  Cardiac: Regular rate and regular rhythm. +S1/S2. No murmurs. Peripheral pulses 2+ and symmetric. No LE edema.  Resp: Speaking in full sentences. No evidence of respiratory distress. No wheezes, rales or rhonchi.  Abd: Soft, non-tender, non-distended. Normal bowel sounds in all 4 quadrants. No guarding or rebound.  Back: Spine midline and non-tender. No CVAT.  Skin: No rashes, abrasions or lacerations.  Neuro: Awake, alert & oriented x 3. Moves all extremities symmetrically.

## 2025-03-03 NOTE — ED PROVIDER NOTE - CLINICAL SUMMARY MEDICAL DECISION MAKING FREE TEXT BOX
39-year-old female with metastatic breast cancer presents complaining of hemoglobin of 6.8 on outpatient testing.  She has baseline lower GI bleeding, that is not exacerbated.  She has been extensively worked up for that and is not  interested in further workup at this time.  Requesting blood transfusion.  IV access established, will check hemoglobin and transfuse as necessary, chronic pain medication ordered.

## 2025-03-03 NOTE — ED CDU PROVIDER INITIAL DAY NOTE - OBJECTIVE STATEMENT
38 y/o F with PMH metastatic breast Ca, chronic anemia/GI bleed requiring multiple transfusions presents for hgb 6.8. She notes some rectal bleeding after BM that is consistent with her usual rectal bleeding, seen by GI in past notd to have hemorrhoids. She notes some baseline chronic pain. She denies fevers, nausea, vomiting.

## 2025-03-03 NOTE — ED CDU PROVIDER INITIAL DAY NOTE - ATTENDING APP SHARED VISIT CONTRIBUTION OF CARE
39-year-old female with metastatic breast cancer presents complaining of hemoglobin of 6.8 on outpatient testing.  She has baseline lower GI bleeding, that is not exacerbated.  She has been extensively worked up in the past.  Requesting blood transfusion.  IV access established, will check hemoglobin and transfuse as necessary, chronic pain medication ordered.

## 2025-03-03 NOTE — ED ADULT NURSE NOTE - OBJECTIVE STATEMENT
A&O x 4. Pt states " my hemoglobin is low." Pt mentioned she was at the Cancer center today and they were unable to get her IV.  Breathing spontaneously on room air. Equal chest expansion noted. Pt is aware of plan and agrees

## 2025-03-04 LAB
BASOPHILS # BLD AUTO: 0.01 K/UL — SIGNIFICANT CHANGE UP (ref 0–0.2)
BASOPHILS NFR BLD AUTO: 0.2 % — SIGNIFICANT CHANGE UP (ref 0–2)
EOSINOPHIL # BLD AUTO: 0.1 K/UL — SIGNIFICANT CHANGE UP (ref 0–0.5)
EOSINOPHIL NFR BLD AUTO: 2.3 % — SIGNIFICANT CHANGE UP (ref 0–6)
HCT VFR BLD CALC: 27.6 % — LOW (ref 34.5–45)
HGB BLD-MCNC: 8.1 G/DL — LOW (ref 11.5–15.5)
IMM GRANULOCYTES # BLD AUTO: 0.07 K/UL — SIGNIFICANT CHANGE UP (ref 0–0.07)
IMM GRANULOCYTES NFR BLD AUTO: 1.6 % — HIGH (ref 0–0.9)
LYMPHOCYTES # BLD AUTO: 0.63 K/UL — LOW (ref 1–3.3)
LYMPHOCYTES NFR BLD AUTO: 14.7 % — SIGNIFICANT CHANGE UP (ref 13–44)
MCHC RBC-ENTMCNC: 28 PG — SIGNIFICANT CHANGE UP (ref 27–34)
MCHC RBC-ENTMCNC: 29.3 G/DL — LOW (ref 32–36)
MCV RBC AUTO: 95.5 FL — SIGNIFICANT CHANGE UP (ref 80–100)
MONOCYTES # BLD AUTO: 0.39 K/UL — SIGNIFICANT CHANGE UP (ref 0–0.9)
MONOCYTES NFR BLD AUTO: 9.1 % — SIGNIFICANT CHANGE UP (ref 2–14)
NEUTROPHILS # BLD AUTO: 3.08 K/UL — SIGNIFICANT CHANGE UP (ref 1.8–7.4)
NEUTROPHILS NFR BLD AUTO: 72.1 % — SIGNIFICANT CHANGE UP (ref 43–77)
NRBC # BLD AUTO: 0.05 K/UL — HIGH (ref 0–0)
NRBC # FLD: 0.05 K/UL — HIGH (ref 0–0)
NRBC BLD AUTO-RTO: 1 /100 WBCS — HIGH (ref 0–0)
PLATELET # BLD AUTO: 151 K/UL — SIGNIFICANT CHANGE UP (ref 150–400)
PMV BLD: 11.9 FL — SIGNIFICANT CHANGE UP (ref 7–13)
RBC # BLD: 2.89 M/UL — LOW (ref 3.8–5.2)
RBC # FLD: 21.3 % — HIGH (ref 10.3–14.5)
WBC # BLD: 4.28 K/UL — SIGNIFICANT CHANGE UP (ref 3.8–10.5)
WBC # FLD AUTO: 4.28 K/UL — SIGNIFICANT CHANGE UP (ref 3.8–10.5)

## 2025-03-04 PROCEDURE — 99233 SBSQ HOSP IP/OBS HIGH 50: CPT

## 2025-03-04 PROCEDURE — 70450 CT HEAD/BRAIN W/O DYE: CPT | Mod: 26

## 2025-03-04 PROCEDURE — 70551 MRI BRAIN STEM W/O DYE: CPT | Mod: 26

## 2025-03-04 RX ADMIN — DIAZEPAM 2 MILLIGRAM(S): 2 TABLET ORAL at 07:32

## 2025-03-04 RX ADMIN — MEMANTINE HYDROCHLORIDE 10 MILLIGRAM(S): 21 CAPSULE, EXTENDED RELEASE ORAL at 06:26

## 2025-03-04 RX ADMIN — DIAZEPAM 2 MILLIGRAM(S): 2 TABLET ORAL at 19:49

## 2025-03-04 RX ADMIN — PREGABALIN 200 MILLIGRAM(S): 50 CAPSULE ORAL at 21:42

## 2025-03-04 RX ADMIN — Medication 2 MILLIGRAM(S): at 11:30

## 2025-03-04 RX ADMIN — Medication 5 MILLIGRAM(S): at 21:43

## 2025-03-04 RX ADMIN — Medication 2 MILLIGRAM(S): at 06:41

## 2025-03-04 RX ADMIN — Medication 2 MILLIGRAM(S): at 14:31

## 2025-03-04 RX ADMIN — Medication 20 MILLIGRAM(S): at 11:28

## 2025-03-04 RX ADMIN — AMINOCAPROIC ACID 3.5 GRAM(S): 0.25 SOLUTION ORAL at 11:29

## 2025-03-04 RX ADMIN — Medication 40 MILLIGRAM(S): at 18:05

## 2025-03-04 RX ADMIN — PREGABALIN 200 MILLIGRAM(S): 50 CAPSULE ORAL at 14:31

## 2025-03-04 RX ADMIN — AMINOCAPROIC ACID 3.5 GRAM(S): 0.25 SOLUTION ORAL at 06:27

## 2025-03-04 RX ADMIN — Medication 2 MILLIGRAM(S): at 18:10

## 2025-03-04 RX ADMIN — MEMANTINE HYDROCHLORIDE 10 MILLIGRAM(S): 21 CAPSULE, EXTENDED RELEASE ORAL at 18:05

## 2025-03-04 RX ADMIN — Medication 2 MILLIGRAM(S): at 21:41

## 2025-03-04 RX ADMIN — Medication 40 MILLIGRAM(S): at 06:26

## 2025-03-04 RX ADMIN — Medication 5 MILLIGRAM(S): at 11:28

## 2025-03-04 RX ADMIN — OLANZAPINE 2.5 MILLIGRAM(S): 10 TABLET ORAL at 21:43

## 2025-03-04 RX ADMIN — Medication 10 MILLIGRAM(S): at 21:43

## 2025-03-04 RX ADMIN — FLUOXETINE HYDROCHLORIDE 80 MILLIGRAM(S): 20 CAPSULE ORAL at 21:42

## 2025-03-04 RX ADMIN — AMINOCAPROIC ACID 3.5 GRAM(S): 0.25 SOLUTION ORAL at 18:06

## 2025-03-04 RX ADMIN — PREGABALIN 200 MILLIGRAM(S): 50 CAPSULE ORAL at 06:26

## 2025-03-04 RX ADMIN — Medication 2 MILLIGRAM(S): at 00:45

## 2025-03-04 NOTE — ED CDU PROVIDER SUBSEQUENT DAY NOTE - PROGRESS NOTE DETAILS
Pt seen on morning rounds with multiple questions appeared confused, ct head negative for acute abnormalities but does show an old lacunar infarct which was not seen on ct from december, pt due to have mri as an outpatient in a few weeks, discussed will obtain mri while inpatient, pt more coherent at this time, agrees with mri, also requesting repeat cbc Received during sign out. Patient in NAD, resting comfortably. Appears less confused than with previous team. Pending MRI results.

## 2025-03-04 NOTE — CONSULT NOTE ADULT - SUBJECTIVE AND OBJECTIVE BOX
HEMATOLOGY ONCOLOGY NP CONSULT     Patient is a 39y old  Female who presents with a chief complaint of anemia   HPI: 39y  Female with h/o metastatic breast cancer ER/MS Neg, HER-2 + on ontruzant (last dose 2/27/25), Tucatinib, Xeloda currently on hold due to diarrhea and bleeding (mets to lung, liver, spleen, spine, bone and brain), gastritis w/ intermittent episodes of dark stool (follows w/ Dr. [facility] and is on PPI and octreotide daily and now of lately on Amicar 3.5mg po q6h . Presents 3/3 with hgb of 6.8 in office found to have hgb in ED of 5.8 without any overt signs of bleeding as per patient.   In office nuclict twice weekly with procrit weekly. She notes that she rarely feels when her hgb is low other then a slight increase in fatigue She most recently met with [facility] - will obtain records for GI bleeds. She denies any current episode of bleeding last bowel movement in office yesterday She otherwise feels well and denies any worsening shortness of breath, chest pain fevers, chills, night sweats.         PAST MEDICAL & SURGICAL HISTORY:  H/O compression fracture of spine    Anxiety  Metastatic breast cancer  H/O pleural effusion  Pericardial effusion  S/P tonsillectomy  H/O chest tube placement  12/23/21    S/P pericardiocentesis  12/28/21    SOCIAL HISTORY:    FAMILY HISTORY:  FH: CVA (cerebrovascular accident)        MEDICATIONS  (STANDING):  aminocaproic acid Tablet 3.5 Gram(s) Oral every 6 hours  FLUoxetine 80 milliGRAM(s) Oral at bedtime  melatonin. 5 milliGRAM(s) Oral at bedtime  memantine 10 milliGRAM(s) Oral two times a day  methadone    Tablet 5 milliGRAM(s) Oral daily  methadone    Tablet 10 milliGRAM(s) Oral at bedtime  methylphenidate 10 milliGRAM(s) Oral at bedtime  methylphenidate 20 milliGRAM(s) Oral daily  OLANZapine 2.5 milliGRAM(s) Oral at bedtime  pantoprazole    Tablet 40 milliGRAM(s) Oral two times a day  pregabalin 200 milliGRAM(s) Oral every 8 hours    MEDICATIONS  (PRN):  diazepam    Tablet 2 milliGRAM(s) Oral every 8 hours PRN muscle spasm  HYDROmorphone  Injectable 2 milliGRAM(s) IV Push every 3 hours PRN Severe Pain (7 - 10)      Allergies    Perjeta (Other (Severe))  pertuzumab (Other (Severe))    Intolerances      Vital Signs Last 24 Hrs  T(C): 36.7 (04 Mar 2025 07:27), Max: 36.9 (03 Mar 2025 22:21)  T(F): 98.1 (04 Mar 2025 07:27), Max: 98.4 (03 Mar 2025 22:21)  HR: 77 (04 Mar 2025 12:12) (75 - 104)  BP: 100/53 (04 Mar 2025 12:12) (97/55 - 120/74)  BP(mean): 75 (04 Mar 2025 06:40) (68 - 75)  RR: 18 (04 Mar 2025 12:12) (14 - 20)  SpO2: 98% (04 Mar 2025 12:12) (98% - 100%)    Parameters below as of 04 Mar 2025 12:12  Patient On (Oxygen Delivery Method): room air        PHYSICAL EXAM  General: adult in NAD  HEENT: clear oropharynx, anicteric sclera, pink conjunctiva  Neck: supple  CV: normal S1/S2 with no murmur rubs or gallops  Lungs: positive air movement b/l ant lungs,clear to auscultation, no wheezes, no rales  Abdomen: soft non-tender non-distended, no hepatosplenomegaly  Ext: no clubbing cyanosis or edema  Skin: no rashes and no petechiae  Neuro: alert and oriented X 4, no focal deficits      LABS:                          5.8    3.97  )-----------( 161      ( 03 Mar 2025 18:15 )             20.7     03-03    141  |  109[H]  |  13.9  ----------------------------<  96  4.0   |  24.0  |  0.50    Ca    8.2[L]      03 Mar 2025 18:15        Mean Cell Volume : 103.0 fl  Mean Cell Hemoglobin : 28.9 pg  Mean Cell Hemoglobin Concentration : 28.0 g/dL  Auto Neutrophil # : 2.86 K/uL  Auto Lymphocyte # : 0.73 K/uL  Auto Monocyte # : 0.23 K/uL  Auto Eosinophil # : 0.07 K/uL  Auto Basophil # : 0.01 K/uL  Auto Neutrophil % : 72.6 %  Auto Lymphocyte % : 18.5 %  Auto Monocyte % : 5.8 %  Auto Eosinophil % : 1.8 %  Auto Basophil % : 0.3 %              RADIOLOGY & ADDITIONAL STUDIES:    CC: [id number] EXAM:  CT BRAIN   ORDERED BY: JEFRY TOLEDO     PROCEDURE DATE:  03/04/2025    INTERPRETATION:  CT head without IV contrast    CLINICAL INFORMATION: confusion ho of mets to brain    TECHNIQUE: Contiguous axial 4 mm sections were obtained through the head.    This scan was performed using automatic exposure control (radiation dose   reduction software) to obtain a diagnostic image quality scan with   patient dose as low as reasonably achievable.    FINDINGS:   CT dated 12/13/2024 available for review.    The brain demonstrates moderate periventricular and deep chronic white   matter ischemia. Encephalomalacia and gliosis in the LEFT cerebellum,   unchanged. Tiny old lacunar infarction in the LEFT thalamus. There is no   evidence of infarction.  No intracranial hemorrhage is found.  No mass   effect is found in the brain.    The ventricles, sulci and basal cisterns appear unremarkable.    The orbits are unremarkable.  The paranasal sinuses demonstrate moderate   mucosal thickening in the BILATERAL maxillary sinuses.  The nasal cavity   appears intact.  The nasopharynx is prominent but symmetric, not atypcial   for age.  The central skull base, petrous temporal bones and calvarium   remain intact.      IMPRESSION:   Moderate periventricular and deep chronic white matter   ischemia. Encephalomalacia and gliosis in the LEFT cerebellum, unchanged.      Moderate mucosal thickening in the BILATERAL maxillary sinuses.       Tiny old lacunar infarction in the LEFT thalamus.      --- End of Report --    TD TAYLOR MD; Attending Radiologist  This document has been electronically signed. Mar  4 2025  9:04AM

## 2025-03-04 NOTE — ED CDU PROVIDER DISPOSITION NOTE - PATIENT PORTAL LINK FT
You can access the FollowMyHealth Patient Portal offered by [facility] by registering at the following website: [email or web address]. By joining [facility]’s FollowMyHealth portal, you will also be able to view your health information using other applications (apps) compatible with our system.

## 2025-03-04 NOTE — ED CDU PROVIDER DISPOSITION NOTE - NSFOLLOWUPINSTRUCTIONS_ED_ALL_ED_FT
Follow up with your hematologist/oncologist  Return to ER as needed for new or worsening symptoms    Anemia    Anemia is a condition in which the concentration of red blood cells or hemoglobin in the blood is below normal. Hemoglobin is a substance in red blood cells that carries oxygen to the tissues of the body. Anemia results in not enough oxygen reaching these tissues which can cause symptoms such as weakness, dizziness/lightheadedness, shortness of breath, chest pain, paleness, or nausea. The cause of your anemia may or may not be determined immediately. If your hemoglobin was dangerously low, you may have received a blood transfusion. Usually reactions to transfusions occur immediately but monitor yourself for any fevers, rash, or shortness of breath.    SEEK IMMEDIATE MEDICAL CARE IF YOU HAVE ANY OF THE FOLLOWING SYMPTOMS: extreme weakness/chest pain/shortness of breath, black or bloody stools, vomiting blood, fainting, fever, or any signs of dehydration.

## 2025-03-04 NOTE — ED CDU PROVIDER DISPOSITION NOTE - CLINICAL COURSE
39-year-old female with metastatic breast cancer presents complaining of hemoglobin of 6.8 on outpatient testing.  She has baseline lower GI bleeding, that is not exacerbated.  She has been extensively worked up for that and is not  interested in further workup at this time.  Requesting blood transfusion. Hgb 5.8 in ED. received 2 units PRBC and tolerated well. Follow up outpatient. 39-year-old female with metastatic breast cancer presents complaining of hemoglobin of 6.8 on outpatient testing.  She has baseline lower GI bleeding, that is not exacerbated.  She has been extensively worked up for that and is not  interested in further workup at this time.  Requesting blood transfusion. Hgb 5.8 in ED. received 2 units PRBC and tolerated well. Follow up outpatient.  Rpt hemoglobin 8.1. CT demonstrated "Tiny old lacunar infarction in the LEFT thalamus." so MRI obtained. MRI with no acute findings compared to prior MRIs. all results reviewed with pt. provided copy of all results. return precautions discussed

## 2025-03-04 NOTE — ED CDU PROVIDER DISPOSITION NOTE - ATTENDING CONTRIBUTION TO CARE
I agree with the PA's note and was available for any issues/concerns. I was directly involved in patient care. My brief overall assessment is as follows: hx metastatic cancer, gib, with anemia, hgb to 5.8 received units with improvement in cbc, had ct/mri without acute findings, neuro at baseline, pt without new complaints, has very close f/u. return precautions.

## 2025-03-04 NOTE — CONSULT NOTE ADULT - ASSESSMENT
HPI: 39y  Female with h/o metastatic breast cancer ER/AZ Neg, HER-2 + on ontruzant (last dose 2/27/25), Tucatinib, Xeloda currently on hold due to diarrhea and bleeding (mets to lung, liver, spleen, spine, bone and brain), gastritis w/ intermittent episodes of dark stool (follows w/ Dr. [facility] and is on PPI and octreotide daily and now of lately on Amicar 3.5mg po q6h . Presents 3/3 with hgb of 6.8 in office found to have hgb in ED of 5.8 without any overt signs of bleeding as per patient.   In office nuclict twice weekly with procrit weekly. She notes that she rarely feels when her hgb is low other then a slight increase in fatigue She most recently met with [facility] - will obtain records for GI bleeds. She denies any current episode of bleeding, last bowel movement in office yesterday She otherwise feels well and denies any worsening shortness of breath, chest pain fevers, chills, night sweats.      Heme Onco   - Follows with Dr. PEDRO COELLO   treatement regiment Include      Capecitabine (Xeloda)1000mg PO BID D1-14 + Tukysa (Tucatinib) 300mg PO BID D1-28 + Herceptin 6mg/kg Q21 days x 8 cycles      Xeloda on hold given diarrhea and recurrent GIB  Last treatment 2/27/25  Procrit 40,000 units once weekly last dose 2/27/25  Nulecit (Ferrlecit)125mg IV twice weekly last dose 3/3/25   keep hgb >7.0 repeat CBC prior to discharge   follow up as out patient upon discharge     Discussed with Dr. Baker on rounds   Arianne Cunningham NP   [facility]   [phone]

## 2025-03-05 VITALS
TEMPERATURE: 98 F | RESPIRATION RATE: 17 BRPM | HEART RATE: 78 BPM | SYSTOLIC BLOOD PRESSURE: 91 MMHG | DIASTOLIC BLOOD PRESSURE: 60 MMHG | OXYGEN SATURATION: 97 %

## 2025-03-05 PROCEDURE — 70450 CT HEAD/BRAIN W/O DYE: CPT | Mod: MC

## 2025-03-05 PROCEDURE — 86900 BLOOD TYPING SEROLOGIC ABO: CPT

## 2025-03-05 PROCEDURE — 86922 COMPATIBILITY TEST ANTIGLOB: CPT

## 2025-03-05 PROCEDURE — P9016: CPT

## 2025-03-05 PROCEDURE — 70551 MRI BRAIN STEM W/O DYE: CPT | Mod: MC

## 2025-03-05 PROCEDURE — 99238 HOSP IP/OBS DSCHRG MGMT 30/<: CPT

## 2025-03-05 PROCEDURE — 36430 TRANSFUSION BLD/BLD COMPNT: CPT

## 2025-03-05 PROCEDURE — 96376 TX/PRO/DX INJ SAME DRUG ADON: CPT

## 2025-03-05 PROCEDURE — 96375 TX/PRO/DX INJ NEW DRUG ADDON: CPT

## 2025-03-05 PROCEDURE — 86850 RBC ANTIBODY SCREEN: CPT

## 2025-03-05 PROCEDURE — 99284 EMERGENCY DEPT VISIT MOD MDM: CPT | Mod: 25

## 2025-03-05 PROCEDURE — 85025 COMPLETE CBC W/AUTO DIFF WBC: CPT

## 2025-03-05 PROCEDURE — 96374 THER/PROPH/DIAG INJ IV PUSH: CPT

## 2025-03-05 PROCEDURE — 36415 COLL VENOUS BLD VENIPUNCTURE: CPT

## 2025-03-05 PROCEDURE — 80048 BASIC METABOLIC PNL TOTAL CA: CPT

## 2025-03-05 PROCEDURE — 86901 BLOOD TYPING SEROLOGIC RH(D): CPT

## 2025-03-05 PROCEDURE — G0378: CPT

## 2025-03-05 RX ADMIN — AMINOCAPROIC ACID 3.5 GRAM(S): 0.25 SOLUTION ORAL at 00:39

## 2025-03-05 RX ADMIN — Medication 2 MILLIGRAM(S): at 00:40

## 2025-03-05 RX ADMIN — Medication 40 MILLIGRAM(S): at 06:01

## 2025-03-05 RX ADMIN — AMINOCAPROIC ACID 3.5 GRAM(S): 0.25 SOLUTION ORAL at 11:19

## 2025-03-05 RX ADMIN — Medication 2 MILLIGRAM(S): at 07:38

## 2025-03-05 RX ADMIN — MEMANTINE HYDROCHLORIDE 10 MILLIGRAM(S): 21 CAPSULE, EXTENDED RELEASE ORAL at 06:01

## 2025-03-05 RX ADMIN — Medication 2 MILLIGRAM(S): at 03:48

## 2025-03-05 RX ADMIN — Medication 5 MILLIGRAM(S): at 11:18

## 2025-03-05 RX ADMIN — Medication 2 MILLIGRAM(S): at 03:43

## 2025-03-05 RX ADMIN — PREGABALIN 200 MILLIGRAM(S): 50 CAPSULE ORAL at 06:01

## 2025-03-05 RX ADMIN — Medication 20 MILLIGRAM(S): at 11:19

## 2025-03-05 RX ADMIN — Medication 2 MILLIGRAM(S): at 00:46

## 2025-03-05 RX ADMIN — AMINOCAPROIC ACID 3.5 GRAM(S): 0.25 SOLUTION ORAL at 06:01

## 2025-03-05 NOTE — ED CDU PROVIDER SUBSEQUENT DAY NOTE - ATTENDING APP SHARED VISIT CONTRIBUTION OF CARE
I agree with the PA's note and was available for any issues/concerns. I was directly involved in patient care. My brief overall assessment is as follows: s/p transfusion, feeling at baseline, got mri, awaiting results. no new deficits
patient received 2u prbc overnight states she feels shaky and tired, pt appears confused unclear if this is baseline therefore unknown how long will do CT and MRI head to eval for worsening metastatic disease vs cva

## 2025-03-05 NOTE — ED CDU PROVIDER SUBSEQUENT DAY NOTE - HISTORY
No acute events overnight
No events. No pertinent interval history. Vital signs remained stable overnight.

## 2025-03-05 NOTE — ED CDU PROVIDER SUBSEQUENT DAY NOTE - NSICDXPASTMEDICALHX_GEN_ALL_CORE_FT
PAST MEDICAL HISTORY:  Anxiety     H/O compression fracture of spine     H/O pleural effusion     Metastatic breast cancer     Pericardial effusion     
PAST MEDICAL HISTORY:  Anxiety     H/O compression fracture of spine     H/O pleural effusion     Metastatic breast cancer     Pericardial effusion

## 2025-03-05 NOTE — ED CDU PROVIDER SUBSEQUENT DAY NOTE - PHYSICAL EXAMINATION
Gen: Chronically ill appearing.   Skin: Warm and dry as visualized.  Head: NC/AT.  Eyes: PERRLA. EOMI.  Neck: Supple, FROM. Trachea midline.   Resp: No distress.  Cardio: Well perfused.  Ext: No deformities. MAEx4. FROM.   Neuro: Appears nonfocal.   Psych: Normal affect and mood.
Const: Awake, alert and oriented. In no acute distress. Chronically ill appearing.  HEENT: NC/AT. Moist mucous membranes.  Eyes: No scleral icterus. EOMI.  Neck:. Soft and supple. Full ROM without pain.  Cardiac: Regular rate and regular rhythm. +S1/S2. No murmurs. Peripheral pulses 2+ and symmetric. No LE edema.  Resp: Speaking in full sentences. No evidence of respiratory distress. No wheezes, rales or rhonchi.  Abd: Soft, non-tender, non-distended. Normal bowel sounds in all 4 quadrants. No guarding or rebound.  Back: Spine midline and non-tender. No CVAT.  Skin: No rashes, abrasions or lacerations.  Neuro: Awake, alert & oriented x 3. Moves all extremities symmetrically.

## 2025-03-05 NOTE — ED CDU PROVIDER SUBSEQUENT DAY NOTE - NS ED ATTENDING STATEMENT MOD
This was a shared visit with the MAGO. I reviewed and verified the documentation.
This was a shared visit with the MAGO. I reviewed and verified the documentation.

## 2025-03-05 NOTE — ED CDU PROVIDER SUBSEQUENT DAY NOTE - CLINICAL SUMMARY MEDICAL DECISION MAKING FREE TEXT BOX
38 yo female PMHx metastatic breast cancer, chronic anemia requiring multiple transfusions, GIB presents to ED placed in OBS for transfusion. Transfusion completed. CT was performed 2/2 confusion - found to have lacunar infarct, pending MRI.

## 2025-03-05 NOTE — ED CDU PROVIDER SUBSEQUENT DAY NOTE - NSICDXFAMILYHX_GEN_ALL_CORE_FT
FAMILY HISTORY:  FH: CVA (cerebrovascular accident)    
FAMILY HISTORY:  FH: CVA (cerebrovascular accident)

## 2025-03-05 NOTE — ED CDU PROVIDER SUBSEQUENT DAY NOTE - PROGRESS NOTE DETAILS
pt evaluate on AM rounds, resting comfortably. MRI unchanged from priors. results d/w pt, stable for dc.

## 2025-03-05 NOTE — ED ADULT NURSE REASSESSMENT NOTE - NS ED NURSE REASSESS COMMENT FT1
Assumed care for patient. Patient resting comfortably on the stretcher. Medicated earlier for pain. Neuro intact. NAD noted.
Assumed care from RN TG. Patient moved to [facility] 2L for private room. Patient is complaining of general 10/10 baseline pain due to met cx and nausea. NSR lead II. VSS. SPO2 98 % on RA. Patient denies CP/SOB. No acute distress noted. Patient has sono guided 18G IV access in right upper arm which is intact. Patient states that PICC line in left upper arm is no longer reusable and is scheduled to have PICC line removed and port inserted on march 12th at [facility] [place]. Patient is in observation for symptomatic anemia. Patient to receive PRBC transfusion. Patient administered IV dilaudid and Zofran.
Pt moved to A5 for observation, PRBC transfusion initiated, V/S as charted.  Pt remains A&Ox4, resting on stretcher in no acute distress.
Pt received at 1900 AOx4. No c/o pain at this time. VSS.  and Cardiac monitor in place strips and alarms reviewed with monitor tech. Stroke neuro assessments q4h. Pt in bed, call bell, and personal items w/in reach. Safety, fall, and aspiration precautions maintained and hourly rounding completed.
Assumed care of pt at 07:15 as stated in report from RN ZENIA. Charting as noted. Patient A&O x4, complains of abdominal pain medication given, denies CP/SOB. Updated on the plan of care. Call bell within reach, bed locked in lowest position. IV site flushed w/ NS. No redness, swelling or pain noted to site. No signs of acute distress noted, safety maintained. Pt remains on CM in NSR.

## 2025-03-05 NOTE — ED CDU PROVIDER SUBSEQUENT DAY NOTE - NSICDXPASTSURGICALHX_GEN_ALL_CORE_FT
PAST SURGICAL HISTORY:  H/O chest tube placement 12/23/21    S/P pericardiocentesis 12/28/21    S/P tonsillectomy     
PAST SURGICAL HISTORY:  H/O chest tube placement 12/23/21    S/P pericardiocentesis 12/28/21    S/P tonsillectomy

## 2025-03-12 ENCOUNTER — OUTPATIENT (OUTPATIENT)
Dept: OUTPATIENT SERVICES | Facility: HOSPITAL | Age: 40
LOS: 1 days | End: 2025-03-12

## 2025-03-12 ENCOUNTER — APPOINTMENT (OUTPATIENT)
Dept: INTERVENTIONAL RADIOLOGY/VASCULAR | Facility: CLINIC | Age: 40
End: 2025-03-12
Payer: MEDICARE

## 2025-03-12 VITALS
TEMPERATURE: 208.76 F | HEART RATE: 86 BPM | SYSTOLIC BLOOD PRESSURE: 99 MMHG | OXYGEN SATURATION: 96 % | DIASTOLIC BLOOD PRESSURE: 65 MMHG | RESPIRATION RATE: 16 BRPM

## 2025-03-12 DIAGNOSIS — Z98.890 OTHER SPECIFIED POSTPROCEDURAL STATES: Chronic | ICD-10-CM

## 2025-03-12 DIAGNOSIS — Z90.89 ACQUIRED ABSENCE OF OTHER ORGANS: Chronic | ICD-10-CM

## 2025-03-12 PROCEDURE — 36561 INSERT TUNNELED CV CATH: CPT | Mod: LT

## 2025-03-12 PROCEDURE — 76937 US GUIDE VASCULAR ACCESS: CPT | Mod: 26

## 2025-03-12 PROCEDURE — 77001 FLUOROGUIDE FOR VEIN DEVICE: CPT | Mod: 26

## 2025-03-15 ENCOUNTER — EMERGENCY (EMERGENCY)
Facility: HOSPITAL | Age: 40
LOS: 1 days | Discharge: HOME | End: 2025-03-15
Attending: EMERGENCY MEDICINE
Payer: MEDICARE

## 2025-03-15 VITALS
RESPIRATION RATE: 16 BRPM | HEIGHT: 62 IN | WEIGHT: 149.91 LBS | TEMPERATURE: 98 F | HEART RATE: 89 BPM | SYSTOLIC BLOOD PRESSURE: 119 MMHG | DIASTOLIC BLOOD PRESSURE: 82 MMHG

## 2025-03-15 DIAGNOSIS — Z90.89 ACQUIRED ABSENCE OF OTHER ORGANS: Chronic | ICD-10-CM

## 2025-03-15 DIAGNOSIS — Z98.890 OTHER SPECIFIED POSTPROCEDURAL STATES: Chronic | ICD-10-CM

## 2025-03-15 LAB
ALBUMIN SERPL ELPH-MCNC: 3 G/DL — LOW (ref 3.3–5.2)
ALP SERPL-CCNC: 273 U/L — HIGH (ref 40–120)
ALT FLD-CCNC: 17 U/L — SIGNIFICANT CHANGE UP
ANION GAP SERPL CALC-SCNC: 10 MMOL/L — SIGNIFICANT CHANGE UP (ref 5–17)
ANISOCYTOSIS BLD QL: SLIGHT — SIGNIFICANT CHANGE UP
APTT BLD: 41.5 SEC — HIGH (ref 24.5–35.6)
AST SERPL-CCNC: 47 U/L — HIGH
BASOPHILS # BLD AUTO: 0.01 K/UL — SIGNIFICANT CHANGE UP (ref 0–0.2)
BASOPHILS # BLD MANUAL: 0 K/UL — SIGNIFICANT CHANGE UP (ref 0–0.2)
BASOPHILS NFR BLD AUTO: 0.3 % — SIGNIFICANT CHANGE UP (ref 0–2)
BASOPHILS NFR BLD MANUAL: 0 % — SIGNIFICANT CHANGE UP (ref 0–2)
BILIRUB SERPL-MCNC: 0.5 MG/DL — SIGNIFICANT CHANGE UP (ref 0.4–2)
BLD GP AB SCN SERPL QL: SIGNIFICANT CHANGE UP
BUN SERPL-MCNC: 7.8 MG/DL — LOW (ref 8–20)
CALCIUM SERPL-MCNC: 8.3 MG/DL — LOW (ref 8.4–10.5)
CHLORIDE SERPL-SCNC: 102 MMOL/L — SIGNIFICANT CHANGE UP (ref 96–108)
CO2 SERPL-SCNC: 23 MMOL/L — SIGNIFICANT CHANGE UP (ref 22–29)
CREAT SERPL-MCNC: 0.47 MG/DL — LOW (ref 0.5–1.3)
DACRYOCYTES BLD QL SMEAR: SLIGHT — SIGNIFICANT CHANGE UP
EGFR: 125 ML/MIN/1.73M2 — SIGNIFICANT CHANGE UP
EGFR: 125 ML/MIN/1.73M2 — SIGNIFICANT CHANGE UP
ELLIPTOCYTES BLD QL SMEAR: SLIGHT — SIGNIFICANT CHANGE UP
EOSINOPHIL # BLD AUTO: 0.09 K/UL — SIGNIFICANT CHANGE UP (ref 0–0.5)
EOSINOPHIL # BLD MANUAL: 0.03 K/UL — SIGNIFICANT CHANGE UP (ref 0–0.5)
EOSINOPHIL NFR BLD AUTO: 2.8 % — SIGNIFICANT CHANGE UP (ref 0–6)
EOSINOPHIL NFR BLD MANUAL: 0.9 % — SIGNIFICANT CHANGE UP (ref 0–6)
GIANT PLATELETS BLD QL SMEAR: PRESENT
GLUCOSE SERPL-MCNC: 93 MG/DL — SIGNIFICANT CHANGE UP (ref 70–99)
HCT VFR BLD CALC: 28 % — LOW (ref 34.5–45)
HGB BLD-MCNC: 7.9 G/DL — LOW (ref 11.5–15.5)
HYPOCHROMIA BLD QL: SLIGHT — SIGNIFICANT CHANGE UP
IMM GRANULOCYTES # BLD AUTO: 0.03 K/UL — SIGNIFICANT CHANGE UP (ref 0–0.07)
IMM GRANULOCYTES NFR BLD AUTO: 0.9 % — SIGNIFICANT CHANGE UP (ref 0–0.9)
INR BLD: 1.1 RATIO — SIGNIFICANT CHANGE UP (ref 0.85–1.16)
LYMPHOCYTES # BLD AUTO: 0.45 K/UL — LOW (ref 1–3.3)
LYMPHOCYTES # BLD MANUAL: 0.37 K/UL — LOW (ref 1–3.3)
LYMPHOCYTES NFR BLD AUTO: 13.8 % — SIGNIFICANT CHANGE UP (ref 13–44)
LYMPHOCYTES NFR BLD MANUAL: 11.2 % — LOW (ref 13–44)
MACROCYTES BLD QL: SLIGHT — SIGNIFICANT CHANGE UP
MANUAL METAMYELOCYTE #: 0.03 K/UL — HIGH (ref 0–0)
MANUAL NRBC #: 0.03 K/UL — HIGH (ref 0–0)
MCHC RBC-ENTMCNC: 27.8 PG — SIGNIFICANT CHANGE UP (ref 27–34)
MCHC RBC-ENTMCNC: 28.2 G/DL — LOW (ref 32–36)
MCV RBC AUTO: 98.6 FL — SIGNIFICANT CHANGE UP (ref 80–100)
METAMYELOCYTES # FLD: 0.9 % — HIGH (ref 0–0)
METAMYELOCYTES NFR BLD: 0.9 % — HIGH (ref 0–0)
MONOCYTES # BLD AUTO: 0.22 K/UL — SIGNIFICANT CHANGE UP (ref 0–0.9)
MONOCYTES # BLD MANUAL: 0.17 K/UL — SIGNIFICANT CHANGE UP (ref 0–0.9)
MONOCYTES NFR BLD AUTO: 6.7 % — SIGNIFICANT CHANGE UP (ref 2–14)
MONOCYTES NFR BLD MANUAL: 5.2 % — SIGNIFICANT CHANGE UP (ref 2–14)
NEUTROPHILS # BLD AUTO: 2.47 K/UL — SIGNIFICANT CHANGE UP (ref 1.8–7.4)
NEUTROPHILS # BLD MANUAL: 2.67 K/UL — SIGNIFICANT CHANGE UP (ref 1.8–7.4)
NEUTROPHILS NFR BLD AUTO: 75.5 % — SIGNIFICANT CHANGE UP (ref 43–77)
NEUTROPHILS NFR BLD MANUAL: 81.8 % — HIGH (ref 43–77)
NRBC # BLD AUTO: 0 K/UL — SIGNIFICANT CHANGE UP (ref 0–0)
NRBC # BLD: 1 /100 WBCS — HIGH (ref 0–0)
NRBC # FLD: 0 K/UL — SIGNIFICANT CHANGE UP (ref 0–0)
NRBC BLD AUTO-RTO: 0 /100 WBCS — SIGNIFICANT CHANGE UP (ref 0–0)
NRBC BLD-RTO: 1 /100 WBCS — HIGH (ref 0–0)
OVALOCYTES BLD QL SMEAR: SLIGHT — SIGNIFICANT CHANGE UP
PLAT MORPH BLD: NORMAL — SIGNIFICANT CHANGE UP
PLATELET # BLD AUTO: 202 K/UL — SIGNIFICANT CHANGE UP (ref 150–400)
PMV BLD: 11 FL — SIGNIFICANT CHANGE UP (ref 7–13)
POIKILOCYTOSIS BLD QL AUTO: SLIGHT — SIGNIFICANT CHANGE UP
POLYCHROMASIA BLD QL SMEAR: SLIGHT — SIGNIFICANT CHANGE UP
POTASSIUM SERPL-MCNC: 4.4 MMOL/L — SIGNIFICANT CHANGE UP (ref 3.5–5.3)
POTASSIUM SERPL-SCNC: 4.4 MMOL/L — SIGNIFICANT CHANGE UP (ref 3.5–5.3)
PROT SERPL-MCNC: 5.5 G/DL — LOW (ref 6.6–8.7)
PROTHROM AB SERPL-ACNC: 12.7 SEC — SIGNIFICANT CHANGE UP (ref 9.9–13.4)
RBC # BLD: 2.84 M/UL — LOW (ref 3.8–5.2)
RBC # FLD: 17.7 % — HIGH (ref 10.3–14.5)
RBC BLD AUTO: ABNORMAL
SODIUM SERPL-SCNC: 135 MMOL/L — SIGNIFICANT CHANGE UP (ref 135–145)
WBC # BLD: 3.27 K/UL — LOW (ref 3.8–10.5)
WBC # FLD AUTO: 3.27 K/UL — LOW (ref 3.8–10.5)

## 2025-03-15 PROCEDURE — 86900 BLOOD TYPING SEROLOGIC ABO: CPT

## 2025-03-15 PROCEDURE — 86901 BLOOD TYPING SEROLOGIC RH(D): CPT

## 2025-03-15 PROCEDURE — 36415 COLL VENOUS BLD VENIPUNCTURE: CPT

## 2025-03-15 PROCEDURE — 94640 AIRWAY INHALATION TREATMENT: CPT

## 2025-03-15 PROCEDURE — P9016: CPT

## 2025-03-15 PROCEDURE — 96376 TX/PRO/DX INJ SAME DRUG ADON: CPT

## 2025-03-15 PROCEDURE — 86850 RBC ANTIBODY SCREEN: CPT

## 2025-03-15 PROCEDURE — 86922 COMPATIBILITY TEST ANTIGLOB: CPT

## 2025-03-15 PROCEDURE — 36430 TRANSFUSION BLD/BLD COMPNT: CPT

## 2025-03-15 PROCEDURE — 99285 EMERGENCY DEPT VISIT HI MDM: CPT

## 2025-03-15 PROCEDURE — 99285 EMERGENCY DEPT VISIT HI MDM: CPT | Mod: 25

## 2025-03-15 PROCEDURE — 85610 PROTHROMBIN TIME: CPT

## 2025-03-15 PROCEDURE — 96374 THER/PROPH/DIAG INJ IV PUSH: CPT

## 2025-03-15 PROCEDURE — 96375 TX/PRO/DX INJ NEW DRUG ADDON: CPT

## 2025-03-15 PROCEDURE — 85730 THROMBOPLASTIN TIME PARTIAL: CPT

## 2025-03-15 PROCEDURE — 80053 COMPREHEN METABOLIC PANEL: CPT

## 2025-03-15 PROCEDURE — 85025 COMPLETE CBC W/AUTO DIFF WBC: CPT

## 2025-03-15 RX ORDER — HYDROMORPHONE/SOD CHLOR,ISO/PF 2 MG/10 ML
1 SYRINGE (ML) INJECTION ONCE
Refills: 0 | Status: DISCONTINUED | OUTPATIENT
Start: 2025-03-15 | End: 2025-03-15

## 2025-03-15 RX ORDER — IPRATROPIUM BROMIDE AND ALBUTEROL SULFATE .5; 2.5 MG/3ML; MG/3ML
3 SOLUTION RESPIRATORY (INHALATION) ONCE
Refills: 0 | Status: COMPLETED | OUTPATIENT
Start: 2025-03-15 | End: 2025-03-15

## 2025-03-15 RX ORDER — HEPARIN SODIUM,PORCINE/NS/PF 20/20 ML
300 SYRINGE (ML) INTRAVENOUS ONCE
Refills: 0 | Status: COMPLETED | OUTPATIENT
Start: 2025-03-15 | End: 2025-03-15

## 2025-03-15 RX ADMIN — Medication 1 MILLIGRAM(S): at 20:22

## 2025-03-15 RX ADMIN — Medication 1 MILLIGRAM(S): at 17:21

## 2025-03-15 RX ADMIN — Medication 300 UNIT(S): at 23:57

## 2025-03-15 RX ADMIN — IPRATROPIUM BROMIDE AND ALBUTEROL SULFATE 3 MILLILITER(S): .5; 2.5 SOLUTION RESPIRATORY (INHALATION) at 17:26

## 2025-03-16 VITALS
OXYGEN SATURATION: 95 % | SYSTOLIC BLOOD PRESSURE: 95 MMHG | HEART RATE: 85 BPM | RESPIRATION RATE: 18 BRPM | DIASTOLIC BLOOD PRESSURE: 62 MMHG

## 2025-03-21 ENCOUNTER — EMERGENCY (EMERGENCY)
Facility: HOSPITAL | Age: 40
LOS: 1 days | Discharge: HOME | End: 2025-03-21
Attending: STUDENT IN AN ORGANIZED HEALTH CARE EDUCATION/TRAINING PROGRAM
Payer: MEDICARE

## 2025-03-21 VITALS
RESPIRATION RATE: 20 BRPM | HEART RATE: 116 BPM | OXYGEN SATURATION: 95 % | DIASTOLIC BLOOD PRESSURE: 78 MMHG | WEIGHT: 143.3 LBS | HEIGHT: 62 IN | TEMPERATURE: 98 F | SYSTOLIC BLOOD PRESSURE: 115 MMHG

## 2025-03-21 DIAGNOSIS — Z98.890 OTHER SPECIFIED POSTPROCEDURAL STATES: Chronic | ICD-10-CM

## 2025-03-21 DIAGNOSIS — Z90.89 ACQUIRED ABSENCE OF OTHER ORGANS: Chronic | ICD-10-CM

## 2025-03-21 LAB
ALBUMIN SERPL ELPH-MCNC: 2.8 G/DL — LOW (ref 3.3–5.2)
ALP SERPL-CCNC: 164 U/L — HIGH (ref 40–120)
ALT FLD-CCNC: 14 U/L — SIGNIFICANT CHANGE UP
ANION GAP SERPL CALC-SCNC: 8 MMOL/L — SIGNIFICANT CHANGE UP (ref 5–17)
APTT BLD: 36.4 SEC — HIGH (ref 24.5–35.6)
AST SERPL-CCNC: 38 U/L — HIGH
BASOPHILS # BLD AUTO: 0.01 K/UL — SIGNIFICANT CHANGE UP (ref 0–0.2)
BASOPHILS NFR BLD AUTO: 0.2 % — SIGNIFICANT CHANGE UP (ref 0–2)
BILIRUB SERPL-MCNC: 0.5 MG/DL — SIGNIFICANT CHANGE UP (ref 0.4–2)
BLD GP AB SCN SERPL QL: SIGNIFICANT CHANGE UP
BUN SERPL-MCNC: 15.5 MG/DL — SIGNIFICANT CHANGE UP (ref 8–20)
CALCIUM SERPL-MCNC: 7.5 MG/DL — LOW (ref 8.4–10.5)
CHLORIDE SERPL-SCNC: 107 MMOL/L — SIGNIFICANT CHANGE UP (ref 96–108)
CO2 SERPL-SCNC: 24 MMOL/L — SIGNIFICANT CHANGE UP (ref 22–29)
CREAT SERPL-MCNC: 0.52 MG/DL — SIGNIFICANT CHANGE UP (ref 0.5–1.3)
EGFR: 122 ML/MIN/1.73M2 — SIGNIFICANT CHANGE UP
EGFR: 122 ML/MIN/1.73M2 — SIGNIFICANT CHANGE UP
EOSINOPHIL # BLD AUTO: 0.04 K/UL — SIGNIFICANT CHANGE UP (ref 0–0.5)
EOSINOPHIL NFR BLD AUTO: 0.7 % — SIGNIFICANT CHANGE UP (ref 0–6)
GLUCOSE SERPL-MCNC: 93 MG/DL — SIGNIFICANT CHANGE UP (ref 70–99)
HCG SERPL-ACNC: <4 MIU/ML — SIGNIFICANT CHANGE UP
HCT VFR BLD CALC: 23.9 % — LOW (ref 34.5–45)
HGB BLD-MCNC: 6.8 G/DL — CRITICAL LOW (ref 11.5–15.5)
IMM GRANULOCYTES # BLD AUTO: 0.05 K/UL — SIGNIFICANT CHANGE UP (ref 0–0.07)
IMM GRANULOCYTES NFR BLD AUTO: 0.9 % — SIGNIFICANT CHANGE UP (ref 0–0.9)
INR BLD: 1.18 RATIO — HIGH (ref 0.85–1.16)
LIDOCAIN IGE QN: 11 U/L — LOW (ref 22–51)
LYMPHOCYTES # BLD AUTO: 0.53 K/UL — LOW (ref 1–3.3)
LYMPHOCYTES NFR BLD AUTO: 9.2 % — LOW (ref 13–44)
MCHC RBC-ENTMCNC: 28.3 PG — SIGNIFICANT CHANGE UP (ref 27–34)
MCHC RBC-ENTMCNC: 28.5 G/DL — LOW (ref 32–36)
MCV RBC AUTO: 99.6 FL — SIGNIFICANT CHANGE UP (ref 80–100)
MONOCYTES # BLD AUTO: 0.45 K/UL — SIGNIFICANT CHANGE UP (ref 0–0.9)
MONOCYTES NFR BLD AUTO: 7.8 % — SIGNIFICANT CHANGE UP (ref 2–14)
NEUTROPHILS # BLD AUTO: 4.68 K/UL — SIGNIFICANT CHANGE UP (ref 1.8–7.4)
NEUTROPHILS NFR BLD AUTO: 81.2 % — HIGH (ref 43–77)
NRBC # BLD AUTO: 0.02 K/UL — HIGH (ref 0–0)
NRBC # FLD: 0.02 K/UL — HIGH (ref 0–0)
NRBC BLD AUTO-RTO: 0 /100 WBCS — SIGNIFICANT CHANGE UP (ref 0–0)
PLATELET # BLD AUTO: 240 K/UL — SIGNIFICANT CHANGE UP (ref 150–400)
PMV BLD: 10.1 FL — SIGNIFICANT CHANGE UP (ref 7–13)
POTASSIUM SERPL-MCNC: 4.1 MMOL/L — SIGNIFICANT CHANGE UP (ref 3.5–5.3)
POTASSIUM SERPL-SCNC: 4.1 MMOL/L — SIGNIFICANT CHANGE UP (ref 3.5–5.3)
PROT SERPL-MCNC: 5.1 G/DL — LOW (ref 6.6–8.7)
PROTHROM AB SERPL-ACNC: 13.7 SEC — HIGH (ref 9.9–13.4)
RBC # BLD: 2.4 M/UL — LOW (ref 3.8–5.2)
RBC # FLD: 18 % — HIGH (ref 10.3–14.5)
SODIUM SERPL-SCNC: 139 MMOL/L — SIGNIFICANT CHANGE UP (ref 135–145)
WBC # BLD: 5.76 K/UL — SIGNIFICANT CHANGE UP (ref 3.8–10.5)
WBC # FLD AUTO: 5.76 K/UL — SIGNIFICANT CHANGE UP (ref 3.8–10.5)

## 2025-03-21 PROCEDURE — 93010 ELECTROCARDIOGRAM REPORT: CPT

## 2025-03-21 PROCEDURE — 74178 CT ABD&PLV WO CNTR FLWD CNTR: CPT | Mod: 26

## 2025-03-21 PROCEDURE — 99223 1ST HOSP IP/OBS HIGH 75: CPT

## 2025-03-21 RX ORDER — HYDROMORPHONE/SOD CHLOR,ISO/PF 2 MG/10 ML
2 SYRINGE (ML) INJECTION
Refills: 0 | Status: DISCONTINUED | OUTPATIENT
Start: 2025-03-21 | End: 2025-03-22

## 2025-03-21 RX ORDER — ONDANSETRON HCL/PF 4 MG/2 ML
4 VIAL (ML) INJECTION ONCE
Refills: 0 | Status: COMPLETED | OUTPATIENT
Start: 2025-03-21 | End: 2025-03-21

## 2025-03-21 RX ADMIN — Medication 2 MILLIGRAM(S): at 16:00

## 2025-03-21 RX ADMIN — Medication 4 MILLIGRAM(S): at 16:00

## 2025-03-21 RX ADMIN — Medication 2 MILLIGRAM(S): at 19:23

## 2025-03-21 RX ADMIN — Medication 2 MILLIGRAM(S): at 22:34

## 2025-03-21 RX ADMIN — Medication 80 MILLIGRAM(S): at 16:00

## 2025-03-21 NOTE — ED PROVIDER NOTE - PHYSICAL EXAMINATION
Gen: Ill appearing  Head: NC/AT  Neck: trachea midline  Card: regular rate and rhythm  Resp:  CTAB  Abd: vague diffuse ttp  Ext: no deformities  Neuro: A&Ox3, DENNEY spontaneously, sensation intact and symmetrical b/l extremities, no facial droop, no slurred speech, EOMI  Skin:  PALE  Psych:  Normal affect and mood

## 2025-03-21 NOTE — ED PROVIDER NOTE - NS ED MD DISPO ADMITTING SERVICE
Study printed from [facility] and sent for scanning.  Referral request placed for CPT: 07571.   MED

## 2025-03-21 NOTE — ED ADULT NURSE NOTE - HISTORY OF COVID-19 VACCINATION
Returned patient's call. Recent rotator cuff surgery.  Having problems sleeping due to discomfort. Saw orthopedics today and they recommended tylenol or motrin. Does not wish to take oxycodone.  Meloxicam 15 mg at bedtime sent to pharmacy.    
98
Vaccine status unknown
no dysuria, no frequency, and no hematuria.

## 2025-03-21 NOTE — ED ADULT NURSE REASSESSMENT NOTE - NS ED NURSE REASSESS COMMENT FT1
Assumed care of patient at 1915, report received from off-going RN. Respirations even and unlabored on room air, resting in ED stretcher, NAD. Wheels locked, bed in lowest position, MD at bedside  CM and  in place

## 2025-03-21 NOTE — ED ADULT NURSE NOTE - OBJECTIVE STATEMENT
Pt A+Ox4 c/o ABD pain, nausea, HA, and vaginal bleeding. Pt denies SOB, Cp, dizziness, fevers, chills, or body aches. Respirations are equal and unlabored on room air, pt speaking complete coherent sentences. Pt states symptoms started past 2 days. Pt connected to  and cardiac monitor. Pts port accessed at this time. Pt has PMH of BR CA that she gets chemo for. Pt left in position of comfort, wheels of stretcher locked and in the lowest position. Call bell within reach.

## 2025-03-21 NOTE — ED ADULT NURSE REASSESSMENT NOTE - NS ED NURSE REASSESS COMMENT FT1
Blood started at this time with RN Demi Bowens as witness. Pt has blood consent in chart. Pre blood VS recorded at 1855. Pt A+Ox4, respirations equal and unlabored on room air. Pt connected to  and cardiac monitor.

## 2025-03-21 NOTE — ED ADULT NURSE NOTE - NSFALLHARMRISKINTERV_ED_ALL_ED

## 2025-03-21 NOTE — ED PROVIDER NOTE - CLINICAL SUMMARY MEDICAL DECISION MAKING FREE TEXT BOX
39 F pmhx of stage 4 breast cancer presenting w/ abd pain, black stool w/ BRBPR as well as an episode of brown vomiting w/ blood streaks. She denies fever, chills, chest pain, SOB, or any other complaints. 39 F pmhx of stage 4 breast cancer presenting w/ abd pain, black stool w/ BRBPR as well as an episode of brown vomiting w/ blood streaks. She denies fever, chills, chest pain, SOB, or any other complaints. CT w/ mild colitis. no episodes of black stool or BRBPR here. Pt placed in obs for transfusion due to Hb 6.8

## 2025-03-21 NOTE — ED PROVIDER NOTE - ATTENDING CONTRIBUTION TO CARE
Pt with a hx of metastatic breast cancer.  Pt states that she has had diffuse abd pain with black stools and small streaked blood. Pt has been worked up for this pain and bleeding many times  Pt also with hx of recurrent anemia.    physical - tachy regular. ctab. abd - soft, mild diffuse ttp.  no rash.    plan - labs reviewed. Pt pending CT. if CT neg for acute path plan to put in obs for xfusion x 2.

## 2025-03-21 NOTE — ED PROVIDER NOTE - CHIEF COMPLAINT
The patient is a 39y Female complaining of vaginal bleeding. The patient is a 39y Female complaining of rectal bleeding.

## 2025-03-21 NOTE — ED ADULT TRIAGE NOTE - CHIEF COMPLAINT QUOTE
pt arrives to ED c/o vaginal bleeding and N/V/abdominal pain, pt states that she bled through 2 pads today, Dilaudid and Methadone for pain, history of right sided breat cancer, denies CP/SOB

## 2025-03-21 NOTE — ED ADULT NURSE NOTE - TEMPLATE LIST FOR HEAD TO TOE ASSESSMENT
Office phone number: [phone].  Call us with questions.     We will watch for the results of the sleep study 7/2/2020.     Continued your current medications.     Holter monitor will be mailed to you, with instructions. Wear it for 24 hours and then return it to us in the enclosed envelope. Record any symptoms your have while wearing it.     
General

## 2025-03-21 NOTE — ED PROVIDER NOTE - OBJECTIVE STATEMENT
39 F pmhx of stage 4 breast cancer presenting w/ abd pain, black stool w/ BRBPR as well as an episode of brown vomiting w/ blood streaks. She denies fever, chills, chest pain, SOB, or any other complaints.

## 2025-03-22 VITALS
SYSTOLIC BLOOD PRESSURE: 99 MMHG | DIASTOLIC BLOOD PRESSURE: 54 MMHG | OXYGEN SATURATION: 97 % | RESPIRATION RATE: 15 BRPM | HEART RATE: 79 BPM

## 2025-03-22 LAB
BASOPHILS # BLD AUTO: 0.02 K/UL — SIGNIFICANT CHANGE UP (ref 0–0.2)
BASOPHILS NFR BLD AUTO: 0.5 % — SIGNIFICANT CHANGE UP (ref 0–2)
EOSINOPHIL # BLD AUTO: 0.09 K/UL — SIGNIFICANT CHANGE UP (ref 0–0.5)
EOSINOPHIL NFR BLD AUTO: 2.1 % — SIGNIFICANT CHANGE UP (ref 0–6)
HCT VFR BLD CALC: 29.4 % — LOW (ref 34.5–45)
HGB BLD-MCNC: 9 G/DL — LOW (ref 11.5–15.5)
IMM GRANULOCYTES # BLD AUTO: 0.05 K/UL — SIGNIFICANT CHANGE UP (ref 0–0.07)
IMM GRANULOCYTES NFR BLD AUTO: 1.2 % — HIGH (ref 0–0.9)
LYMPHOCYTES # BLD AUTO: 0.63 K/UL — LOW (ref 1–3.3)
LYMPHOCYTES NFR BLD AUTO: 14.6 % — SIGNIFICANT CHANGE UP (ref 13–44)
MCHC RBC-ENTMCNC: 29 PG — SIGNIFICANT CHANGE UP (ref 27–34)
MCHC RBC-ENTMCNC: 30.6 G/DL — LOW (ref 32–36)
MCV RBC AUTO: 94.8 FL — SIGNIFICANT CHANGE UP (ref 80–100)
MONOCYTES # BLD AUTO: 0.44 K/UL — SIGNIFICANT CHANGE UP (ref 0–0.9)
MONOCYTES NFR BLD AUTO: 10.2 % — SIGNIFICANT CHANGE UP (ref 2–14)
NEUTROPHILS # BLD AUTO: 3.09 K/UL — SIGNIFICANT CHANGE UP (ref 1.8–7.4)
NEUTROPHILS NFR BLD AUTO: 71.4 % — SIGNIFICANT CHANGE UP (ref 43–77)
NRBC # BLD AUTO: 0 K/UL — SIGNIFICANT CHANGE UP (ref 0–0)
NRBC # FLD: 0 K/UL — SIGNIFICANT CHANGE UP (ref 0–0)
NRBC BLD AUTO-RTO: 0 /100 WBCS — SIGNIFICANT CHANGE UP (ref 0–0)
PLATELET # BLD AUTO: 211 K/UL — SIGNIFICANT CHANGE UP (ref 150–400)
PMV BLD: 10.6 FL — SIGNIFICANT CHANGE UP (ref 7–13)
RBC # BLD: 3.1 M/UL — LOW (ref 3.8–5.2)
RBC # FLD: 18.6 % — HIGH (ref 10.3–14.5)
WBC # BLD: 4.32 K/UL — SIGNIFICANT CHANGE UP (ref 3.8–10.5)
WBC # FLD AUTO: 4.32 K/UL — SIGNIFICANT CHANGE UP (ref 3.8–10.5)

## 2025-03-22 PROCEDURE — 36415 COLL VENOUS BLD VENIPUNCTURE: CPT

## 2025-03-22 PROCEDURE — 96374 THER/PROPH/DIAG INJ IV PUSH: CPT | Mod: XU

## 2025-03-22 PROCEDURE — 86922 COMPATIBILITY TEST ANTIGLOB: CPT

## 2025-03-22 PROCEDURE — 36430 TRANSFUSION BLD/BLD COMPNT: CPT

## 2025-03-22 PROCEDURE — 85025 COMPLETE CBC W/AUTO DIFF WBC: CPT

## 2025-03-22 PROCEDURE — 86901 BLOOD TYPING SEROLOGIC RH(D): CPT

## 2025-03-22 PROCEDURE — 99291 CRITICAL CARE FIRST HOUR: CPT

## 2025-03-22 PROCEDURE — 86900 BLOOD TYPING SEROLOGIC ABO: CPT

## 2025-03-22 PROCEDURE — 99284 EMERGENCY DEPT VISIT MOD MDM: CPT | Mod: 25

## 2025-03-22 PROCEDURE — 80053 COMPREHEN METABOLIC PANEL: CPT

## 2025-03-22 PROCEDURE — 71046 X-RAY EXAM CHEST 2 VIEWS: CPT

## 2025-03-22 PROCEDURE — G0378: CPT

## 2025-03-22 PROCEDURE — 85610 PROTHROMBIN TIME: CPT

## 2025-03-22 PROCEDURE — 71046 X-RAY EXAM CHEST 2 VIEWS: CPT | Mod: 26

## 2025-03-22 PROCEDURE — 84702 CHORIONIC GONADOTROPIN TEST: CPT

## 2025-03-22 PROCEDURE — P9016: CPT

## 2025-03-22 PROCEDURE — 96376 TX/PRO/DX INJ SAME DRUG ADON: CPT

## 2025-03-22 PROCEDURE — 93005 ELECTROCARDIOGRAM TRACING: CPT

## 2025-03-22 PROCEDURE — 86850 RBC ANTIBODY SCREEN: CPT

## 2025-03-22 PROCEDURE — 85730 THROMBOPLASTIN TIME PARTIAL: CPT

## 2025-03-22 PROCEDURE — 83690 ASSAY OF LIPASE: CPT

## 2025-03-22 PROCEDURE — 96375 TX/PRO/DX INJ NEW DRUG ADDON: CPT

## 2025-03-22 PROCEDURE — 74178 CT ABD&PLV WO CNTR FLWD CNTR: CPT | Mod: MC

## 2025-03-22 RX ORDER — HEPARIN SODIUM,PORCINE/NS/PF 20/20 ML
300 SYRINGE (ML) INTRAVENOUS ONCE
Refills: 0 | Status: COMPLETED | OUTPATIENT
Start: 2025-03-22 | End: 2025-03-22

## 2025-03-22 RX ADMIN — Medication 2 MILLIGRAM(S): at 07:12

## 2025-03-22 RX ADMIN — Medication 300 UNIT(S): at 10:47

## 2025-03-22 RX ADMIN — Medication 2 MILLIGRAM(S): at 03:10

## 2025-03-22 NOTE — ED CDU PROVIDER INITIAL DAY NOTE - ATTENDING APP SHARED VISIT CONTRIBUTION OF CARE
Pt with a hx of metastatic breast cancer.  Pt states that she has had diffuse abd pain with black stools and small streaked blood. Pt has been worked up for this multiple times in past. no additional w/up here for bleeding, plan to place in obs for transfusion

## 2025-03-22 NOTE — ED CDU PROVIDER DISPOSITION NOTE - PATIENT PORTAL LINK FT
You can access the FollowMyHealth Patient Portal offered by [facility] by registering at the following website: [email or web address]. By joining [facility]’s FollowMyHealth portal, you will also be able to view your health information using other applications (apps) compatible with our system.

## 2025-03-22 NOTE — ED CDU PROVIDER INITIAL DAY NOTE - ATTENDING CONTRIBUTION TO CARE
Pt with a hx of metastatic breast cancer.  Pt states that she has had diffuse abd pain with black stools and small streaked blood. Pt has been worked up for this pain and bleeding many times  Pt also with hx of recurrent anemia.    physical - tachy regular. ctab. abd - soft, mild diffuse ttp.  no rash.    plan - labs reviewed. Pt pending CT. if CT neg for acute path plan to put in obs for xfusion x 2. Pt with a hx of metastatic breast cancer.  Pt states that she has had diffuse abd pain with black stools and small streaked blood. Pt has been worked up for this multiple times in past. no additional w/up here for bleeding, plan to place in obs for transfusion

## 2025-03-22 NOTE — ED CDU PROVIDER INITIAL DAY NOTE - CLINICAL SUMMARY MEDICAL DECISION MAKING FREE TEXT BOX
40yo F pmh stage 4 breast Ca p/w abdominal pain, dark stool with BRBPR, one episode of brown emesis with blood streaks. ED diagnostic work up reviewed; labs sig for anemia, hcg 6.8. CT showed minor residual vs recurrent  colitis. pt placed in obs for 2u PRBC 38yo F pmh stage 4 breast Ca p/w abdominal pain, dark stool with BRBPR, one episode of brown emesis with blood streaks. ED diagnostic work up reviewed; labs sig for anemia, hcg 6.8. CT showed minor residual vs recurrent  colitis. pt placed in obs for 2u PRBCT. CXR added as pt c/o recent cough. will rpt cbc after PRBT

## 2025-03-22 NOTE — ED CDU PROVIDER INITIAL DAY NOTE - NS ED ATTENDING STATEMENT MOD
I have personally provided the amount of critical care time documented below concurrently with the resident/fellow.  This time excludes time spent on separate procedures and time spent teaching. I have reviewed the resident’s / fellow’s documentation and I agree with the history, exam, and assessment and plan of care. This was a shared visit with the MAGO. I reviewed and verified the documentation.

## 2025-03-22 NOTE — ED CDU PROVIDER DISPOSITION NOTE - ATTENDING CONTRIBUTION TO CARE
40yo F pmh stage 4 breast Ca p/w abdominal pain, dark stool with BRBPR, one episode of brown emesis with blood streaks. ED diagnostic work up reviewed; labs sig for anemia, hcg 6.8. CT showed minor residual vs recurrent  colitis. pt placed in obs for 2u PRBCT. CXR added as pt c/o recent cough. CXR reviewed- consistent with baseline interstitial lung dz, pending official read by radiologist. Pt stable for d/c with outpt oncology f/u.

## 2025-03-22 NOTE — ED CDU PROVIDER SUBSEQUENT DAY NOTE - CLINICAL SUMMARY MEDICAL DECISION MAKING FREE TEXT BOX
38yo F pmh stage 4 breast Ca p/w abdominal pain, dark stool with BRBPR, one episode of brown emesis with blood streaks. ED diagnostic work up reviewed; labs sig for anemia, hcg 6.8. CT showed minor residual vs recurrent  colitis. pt placed in obs for 2u PRBCT. CXR added as pt c/o recent cough. will rpt cbc after PRBT

## 2025-03-22 NOTE — ED ADULT NURSE REASSESSMENT NOTE - NS ED NURSE REASSESS COMMENT FT1
pt received from VADIM Byers at 0715. pt resting in stretcher, A+O x3. RR even and unlabored on RA. offers no complaints at this time. pt updated on plan of care.

## 2025-03-22 NOTE — ED CDU PROVIDER SUBSEQUENT DAY NOTE - HISTORY
Pt resting comfortably at time of re-assessment. No events overnight. Pending rpt labs and CXR. Will continue to monitor.

## 2025-03-24 RX ORDER — AZITHROMYCIN 250 MG
1 CAPSULE ORAL
Qty: 1 | Refills: 0
Start: 2025-03-24 | End: 2025-03-28

## 2025-03-24 RX ORDER — AMOXICILLIN AND CLAVULANATE POTASSIUM 500; 125 MG/1; MG/1
1 TABLET, FILM COATED ORAL
Qty: 10 | Refills: 0
Start: 2025-03-24 | End: 2025-03-28

## 2025-03-24 NOTE — ED POST DISCHARGE NOTE - RESULT SUMMARY
official read showing multifocal PNA vs pulmonary congestion. h/o metastatic breast CA. left voicemail to call back for results.

## 2025-03-24 NOTE — ED POST DISCHARGE NOTE - DETAILS
spoke with patient's sister on behalf of the patient - gave results of pneumonia vs pulmonary edema - patient has a cough, but no fever or respiratory distress - will sent antibiotiic - advised patient to f/u with pmd and to return immediately if she feels chest pain, shortness of breath or fever

## 2025-03-28 ENCOUNTER — EMERGENCY (EMERGENCY)
Facility: HOSPITAL | Age: 40
LOS: 1 days | Discharge: HOME | End: 2025-03-28
Attending: STUDENT IN AN ORGANIZED HEALTH CARE EDUCATION/TRAINING PROGRAM
Payer: MEDICARE

## 2025-03-28 VITALS
HEIGHT: 62 IN | HEART RATE: 102 BPM | RESPIRATION RATE: 18 BRPM | SYSTOLIC BLOOD PRESSURE: 107 MMHG | TEMPERATURE: 99 F | DIASTOLIC BLOOD PRESSURE: 72 MMHG | OXYGEN SATURATION: 95 % | WEIGHT: 141.98 LBS

## 2025-03-28 DIAGNOSIS — Z90.89 ACQUIRED ABSENCE OF OTHER ORGANS: Chronic | ICD-10-CM

## 2025-03-28 DIAGNOSIS — Z98.890 OTHER SPECIFIED POSTPROCEDURAL STATES: Chronic | ICD-10-CM

## 2025-03-28 LAB
ACANTHOCYTES BLD QL SMEAR: SLIGHT — SIGNIFICANT CHANGE UP
ANION GAP SERPL CALC-SCNC: 10 MMOL/L — SIGNIFICANT CHANGE UP (ref 5–17)
ANISOCYTOSIS BLD QL: SLIGHT — SIGNIFICANT CHANGE UP
APTT BLD: 38.3 SEC — HIGH (ref 24.5–35.6)
BASOPHILS # BLD AUTO: 0.01 K/UL — SIGNIFICANT CHANGE UP (ref 0–0.2)
BASOPHILS # BLD MANUAL: 0 K/UL — SIGNIFICANT CHANGE UP (ref 0–0.2)
BASOPHILS NFR BLD AUTO: 0.3 % — SIGNIFICANT CHANGE UP (ref 0–2)
BASOPHILS NFR BLD MANUAL: 0 % — SIGNIFICANT CHANGE UP (ref 0–2)
BLD GP AB SCN SERPL QL: SIGNIFICANT CHANGE UP
BUN SERPL-MCNC: 13.2 MG/DL — SIGNIFICANT CHANGE UP (ref 8–20)
CALCIUM SERPL-MCNC: 7.9 MG/DL — LOW (ref 8.4–10.5)
CO2 SERPL-SCNC: 24 MMOL/L — SIGNIFICANT CHANGE UP (ref 22–29)
DACRYOCYTES BLD QL SMEAR: ABNORMAL
EGFR: 119 ML/MIN/1.73M2 — SIGNIFICANT CHANGE UP
EGFR: 119 ML/MIN/1.73M2 — SIGNIFICANT CHANGE UP
EOSINOPHIL # BLD AUTO: 0.06 K/UL — SIGNIFICANT CHANGE UP (ref 0–0.5)
EOSINOPHIL # BLD MANUAL: 0.24 K/UL — SIGNIFICANT CHANGE UP (ref 0–0.5)
EOSINOPHIL NFR BLD AUTO: 1.6 % — SIGNIFICANT CHANGE UP (ref 0–6)
EOSINOPHIL NFR BLD MANUAL: 6.4 % — HIGH (ref 0–6)
GIANT PLATELETS BLD QL SMEAR: PRESENT
GLUCOSE SERPL-MCNC: 97 MG/DL — SIGNIFICANT CHANGE UP (ref 70–99)
HCT VFR BLD CALC: 20.7 % — CRITICAL LOW (ref 34.5–45)
HGB BLD-MCNC: 6.2 G/DL — CRITICAL LOW (ref 11.5–15.5)
HYPOCHROMIA BLD QL: SIGNIFICANT CHANGE UP
IMM GRANULOCYTES # BLD AUTO: 0.04 K/UL — SIGNIFICANT CHANGE UP (ref 0–0.07)
IMM GRANULOCYTES NFR BLD AUTO: 1.1 % — HIGH (ref 0–0.9)
INR BLD: 1.09 RATIO — SIGNIFICANT CHANGE UP (ref 0.85–1.16)
LYMPHOCYTES # BLD AUTO: 0.5 K/UL — LOW (ref 1–3.3)
MANUAL NEUTROPHIL BANDS #: 0.03 K/UL — SIGNIFICANT CHANGE UP (ref 0–0.84)
MCHC RBC-ENTMCNC: 29.5 PG — SIGNIFICANT CHANGE UP (ref 27–34)
MCHC RBC-ENTMCNC: 30 G/DL — LOW (ref 32–36)
MCV RBC AUTO: 98.6 FL — SIGNIFICANT CHANGE UP (ref 80–100)
MICROCYTES BLD QL: SLIGHT — SIGNIFICANT CHANGE UP
MONOCYTES # BLD AUTO: 0.32 K/UL — SIGNIFICANT CHANGE UP (ref 0–0.9)
MONOCYTES # BLD MANUAL: 0.07 K/UL — SIGNIFICANT CHANGE UP (ref 0–0.9)
MONOCYTES NFR BLD AUTO: 8.7 % — SIGNIFICANT CHANGE UP (ref 2–14)
MONOCYTES NFR BLD MANUAL: 1.8 % — LOW (ref 2–14)
NEUTROPHILS # BLD AUTO: 2.75 K/UL — SIGNIFICANT CHANGE UP (ref 1.8–7.4)
NEUTROPHILS # BLD MANUAL: 2.77 K/UL — SIGNIFICANT CHANGE UP (ref 1.8–7.4)
NEUTROPHILS NFR BLD AUTO: 74.7 % — SIGNIFICANT CHANGE UP (ref 43–77)
NEUTROPHILS NFR BLD MANUAL: 75.3 % — SIGNIFICANT CHANGE UP (ref 43–77)
NEUTS BAND NFR BLD: 0.9 % — SIGNIFICANT CHANGE UP (ref 0–8)
NRBC # BLD AUTO: 0.02 K/UL — HIGH (ref 0–0)
NRBC # FLD: 0.02 K/UL — HIGH (ref 0–0)
NRBC BLD AUTO-RTO: 0 /100 WBCS — SIGNIFICANT CHANGE UP (ref 0–0)
OVALOCYTES BLD QL SMEAR: SLIGHT — SIGNIFICANT CHANGE UP
PLAT MORPH BLD: NORMAL — SIGNIFICANT CHANGE UP
PLATELET # BLD AUTO: 204 K/UL — SIGNIFICANT CHANGE UP (ref 150–400)
PMV BLD: 11.1 FL — SIGNIFICANT CHANGE UP (ref 7–13)
POIKILOCYTOSIS BLD QL AUTO: ABNORMAL
POTASSIUM SERPL-MCNC: 3.6 MMOL/L — SIGNIFICANT CHANGE UP (ref 3.5–5.3)
POTASSIUM SERPL-SCNC: 3.6 MMOL/L — SIGNIFICANT CHANGE UP (ref 3.5–5.3)
PROTHROM AB SERPL-ACNC: 12.6 SEC — SIGNIFICANT CHANGE UP (ref 9.9–13.4)
RBC # BLD: 2.1 M/UL — LOW (ref 3.8–5.2)
RBC # FLD: 18.6 % — HIGH (ref 10.3–14.5)
RBC BLD AUTO: ABNORMAL
SCHISTOCYTES BLD QL AUTO: SLIGHT — SIGNIFICANT CHANGE UP
SMUDGE CELLS # BLD: PRESENT
SODIUM SERPL-SCNC: 141 MMOL/L — SIGNIFICANT CHANGE UP (ref 135–145)
WBC # BLD: 3.7 K/UL — LOW (ref 3.8–10.5)
WBC # FLD AUTO: 3.7 K/UL — LOW (ref 3.8–10.5)

## 2025-03-28 PROCEDURE — 99291 CRITICAL CARE FIRST HOUR: CPT

## 2025-03-28 RX ORDER — METHYLPHENIDATE HCL 20 MG
20 TABLET, EXTENDED RELEASE ORAL DAILY
Refills: 0 | Status: COMPLETED | OUTPATIENT
Start: 2025-03-28 | End: 2025-04-04

## 2025-03-28 RX ORDER — HYDROMORPHONE/SOD CHLOR,ISO/PF 2 MG/10 ML
2 SYRINGE (ML) INJECTION
Refills: 0 | Status: DISCONTINUED | OUTPATIENT
Start: 2025-03-28 | End: 2025-03-29

## 2025-03-28 RX ORDER — FLUOXETINE HYDROCHLORIDE 20 MG/1
80 CAPSULE ORAL AT BEDTIME
Refills: 0 | Status: ACTIVE | OUTPATIENT
Start: 2025-03-28 | End: 2026-02-24

## 2025-03-28 RX ORDER — OLANZAPINE 10 MG/1
2.5 TABLET ORAL AT BEDTIME
Refills: 0 | Status: ACTIVE | OUTPATIENT
Start: 2025-03-28 | End: 2026-02-24

## 2025-03-28 RX ORDER — DIAZEPAM 2 MG/1
2 TABLET ORAL EVERY 8 HOURS
Refills: 0 | Status: COMPLETED | OUTPATIENT
Start: 2025-03-28 | End: 2025-04-04

## 2025-03-28 RX ORDER — LIDOCAINE HYDROCHLORIDE 20 MG/ML
1 JELLY TOPICAL DAILY
Refills: 0 | Status: ACTIVE | OUTPATIENT
Start: 2025-03-28 | End: 2026-02-24

## 2025-03-28 RX ORDER — OCTREOTIDE ACETATE 500 UG/ML
100 INJECTION, SOLUTION INTRAVENOUS; SUBCUTANEOUS
Refills: 0 | Status: ACTIVE | OUTPATIENT
Start: 2025-03-28 | End: 2026-02-24

## 2025-03-28 RX ORDER — ONDANSETRON HCL/PF 4 MG/2 ML
4 VIAL (ML) INJECTION ONCE
Refills: 0 | Status: COMPLETED | OUTPATIENT
Start: 2025-03-28 | End: 2025-03-28

## 2025-03-28 RX ORDER — MEMANTINE HYDROCHLORIDE 21 MG/1
10 CAPSULE, EXTENDED RELEASE ORAL
Refills: 0 | Status: ACTIVE | OUTPATIENT
Start: 2025-03-28 | End: 2026-02-24

## 2025-03-28 RX ORDER — METHYLPHENIDATE HCL 20 MG
10 TABLET, EXTENDED RELEASE ORAL AT BEDTIME
Refills: 0 | Status: COMPLETED | OUTPATIENT
Start: 2025-03-28 | End: 2025-04-04

## 2025-03-28 RX ORDER — METHADONE HCL 10 MG
5 TABLET ORAL
Refills: 0 | Status: COMPLETED | OUTPATIENT
Start: 2025-03-28 | End: 2025-04-04

## 2025-03-28 RX ORDER — PREGABALIN 50 MG/1
200 CAPSULE ORAL EVERY 8 HOURS
Refills: 0 | Status: COMPLETED | OUTPATIENT
Start: 2025-03-28 | End: 2025-04-04

## 2025-03-28 RX ORDER — FUROSEMIDE 10 MG/ML
20 INJECTION INTRAMUSCULAR; INTRAVENOUS DAILY
Refills: 0 | Status: ACTIVE | OUTPATIENT
Start: 2025-03-28 | End: 2026-02-24

## 2025-03-28 RX ORDER — METHADONE HCL 10 MG
10 TABLET ORAL AT BEDTIME
Refills: 0 | Status: COMPLETED | OUTPATIENT
Start: 2025-03-28 | End: 2025-04-04

## 2025-03-28 RX ADMIN — FLUOXETINE HYDROCHLORIDE 80 MILLIGRAM(S): 20 CAPSULE ORAL at 22:11

## 2025-03-28 RX ADMIN — Medication 2 MILLIGRAM(S): at 20:48

## 2025-03-28 RX ADMIN — OLANZAPINE 2.5 MILLIGRAM(S): 10 TABLET ORAL at 22:22

## 2025-03-28 RX ADMIN — Medication 10 MILLIGRAM(S): at 23:00

## 2025-03-28 RX ADMIN — Medication 2 MILLIGRAM(S): at 16:26

## 2025-03-28 RX ADMIN — Medication 4 MILLIGRAM(S): at 21:18

## 2025-03-28 RX ADMIN — PREGABALIN 200 MILLIGRAM(S): 50 CAPSULE ORAL at 22:59

## 2025-03-28 RX ADMIN — Medication 2 MILLIGRAM(S): at 18:41

## 2025-03-28 RX ADMIN — DIAZEPAM 2 MILLIGRAM(S): 2 TABLET ORAL at 23:00

## 2025-03-28 NOTE — ED PROVIDER NOTE - ATTENDING CONTRIBUTION TO CARE
pt states that she is here as her hgb was low on outpatient labs.  pt complaints of weakness. pt here frequently for the same.    physical - rrr. ctab. abd - soft, nt. no edema. no rash.    plan - labs reviewed. hgb 6.2. will give 2 units. will put in obs.

## 2025-03-28 NOTE — ED PROVIDER NOTE - CLINICAL SUMMARY MEDICAL DECISION MAKING FREE TEXT BOX
39 year old female with pmhx of breast cancer presents from outpatient office for low hemoglobin. Hgb on bloodwork yesterday was 7.5. Pt well known to this ED for frequent blood transfusions. Will obtain basic labs and type and screen and transfuse as needed.

## 2025-03-28 NOTE — ED CDU PROVIDER INITIAL DAY NOTE - PHYSICAL EXAMINATION
Gen: No acute distress, non toxic  HENT: NCAT, Mucous membranes moist, Oropharynx without exudates, uvula midline  Eyes: pink conjunctivae, EOMI, PERRL  CV: RRR, nl s1/s2.  Resp: CTAB, normal rate and effort  GI: Abdomen soft, NT, ND. No rebound, no guarding  Neuro: A&O x 3, no focal deficits  MSK: No spine or joint tenderness to palpation, Full ROM ext x 4  Skin: No rashes. intact and perfused.

## 2025-03-28 NOTE — ED PROVIDER NOTE - PHYSICAL EXAMINATION
General: pallor, NAD  HEENT: Normocephalic, atraumatic. No scleral icterus or conjunctival injection. EOMI. Moist mucous membranes. Oropharynx clear.   Neck:. Soft and supple.  Cardiac: RRR, Peripheral pulses 2+ and symmetric. No LE edema.  Resp: Lungs CTAB. No accessory muscle use  Abd: Soft, non-tender, non-distended. No guarding, rebound, or rigidity.  Back: Spine midline and non-tender.   Skin: No rashes, abrasions, or lacerations.  Neuro: AO x 4. Moves all extremities symmetrically. Motor strength and sensation grossly intact.  Psych: Appropriate mood and affect

## 2025-03-28 NOTE — PATIENT PROFILE ADULT - NSPROPTRIGHTCAREGIVER_GEN_A_NUR
p/w severe L knee pain and inability to bear weight  Xray shows narrowing of joint space (wet read) and severe OA in the lateral compartment with a small suprapatellar effusion  hx of right total knee replacement   PT recc LEXI   - fall precautions no

## 2025-03-28 NOTE — ED CDU PROVIDER INITIAL DAY NOTE - NS ED ROS FT
Gen: denies fever, chills, fatigue, weight loss  Skin: denies rashes, laceration, bruising  HEENT: denies visual changes, ear pain, nasal congestion, throat pain  Respiratory: denies LOUIE, SOB, cough, wheezing  Cardiovascular: denies chest pain, palpitations, diaphoresis, LE edema  GI: +nausea  : denies dysuria, frequency, urgency, bowel/bladder incontinence  MSK: denies joint swelling/pain, back pain, neck pain

## 2025-03-28 NOTE — ED CDU PROVIDER INITIAL DAY NOTE - ATTENDING APP SHARED VISIT CONTRIBUTION OF CARE
40yo female with pmhx breast CA, well known to ED for frequent blood transfusions, presents to ED after being sent in by MD for low hemoglobin. Hgb in ED 6.2. Pt placed into observation for 2 units PRBCs.

## 2025-03-28 NOTE — ED CDU PROVIDER INITIAL DAY NOTE - CLINICAL SUMMARY MEDICAL DECISION MAKING FREE TEXT BOX
40yo female with pmhx breast CA, well known to ED for frequent blood transfusions, presents to ED after being sent in by MD for low hemoglobin. Pt states she had bloodwork done outpatient yesterday and hg was 7.5. Pt states she is having generalized weakness and mild nausea however this is her normal baseline. ED labs reviewed. Hgb in ED 6.2. Pt placed into observation for 2 units PRBCs. Pt has no current complaints at this time.

## 2025-03-28 NOTE — ED CDU PROVIDER INITIAL DAY NOTE - OBJECTIVE STATEMENT
40yo female with pmhx breast CA, well known to ED for frequent blood transfusions, presents to ED after being sent in by MD for low hemoglobin. Pt states she had bloodwork done outpatient yesterday and hg was 7.5. Pt states she is having 40yo female with pmhx breast CA, well known to ED for frequent blood transfusions, presents to ED after being sent in by MD for low hemoglobin. Pt states she had bloodwork done outpatient yesterday and hg was 7.5. Pt states she is having generalized weakness and mild nausea however this is her normal baseline. Pt denies any other complaints at this time.

## 2025-03-28 NOTE — ED PROVIDER NOTE - OBJECTIVE STATEMENT
39 year old female with pmhx of breast cancer presents from outpatient office for low hemoglobin. Hgb on bloodwork yesterday was 7.5. Pt well known to this ED for frequent blood transfusions. Pt reports generalized weakness, denies fever, viral symptoms, syncope, chest pain, sob, lightheadedness, vision changes or any other complaints at this time.

## 2025-03-29 VITALS
DIASTOLIC BLOOD PRESSURE: 56 MMHG | OXYGEN SATURATION: 98 % | RESPIRATION RATE: 18 BRPM | HEART RATE: 71 BPM | SYSTOLIC BLOOD PRESSURE: 88 MMHG | TEMPERATURE: 98 F

## 2025-03-29 LAB
ANISOCYTOSIS BLD QL: SLIGHT — SIGNIFICANT CHANGE UP
BASOPHILS # BLD AUTO: 0.01 K/UL — SIGNIFICANT CHANGE UP (ref 0–0.2)
BASOPHILS # BLD MANUAL: 0 K/UL — SIGNIFICANT CHANGE UP (ref 0–0.2)
BASOPHILS NFR BLD AUTO: 0.4 % — SIGNIFICANT CHANGE UP (ref 0–2)
BASOPHILS NFR BLD MANUAL: 0 % — SIGNIFICANT CHANGE UP (ref 0–2)
DACRYOCYTES BLD QL SMEAR: SLIGHT — SIGNIFICANT CHANGE UP
EOSINOPHIL # BLD AUTO: 0.05 K/UL — SIGNIFICANT CHANGE UP (ref 0–0.5)
EOSINOPHIL # BLD MANUAL: 0.04 K/UL — SIGNIFICANT CHANGE UP (ref 0–0.5)
EOSINOPHIL NFR BLD MANUAL: 1.6 % — SIGNIFICANT CHANGE UP (ref 0–6)
GIANT PLATELETS BLD QL SMEAR: PRESENT
HCT VFR BLD CALC: 26.7 % — LOW (ref 34.5–45)
HGB BLD-MCNC: 8.3 G/DL — LOW (ref 11.5–15.5)
HYPOCHROMIA BLD QL: SLIGHT — SIGNIFICANT CHANGE UP
IMM GRANULOCYTES # BLD AUTO: 0.03 K/UL — SIGNIFICANT CHANGE UP (ref 0–0.07)
IMM GRANULOCYTES NFR BLD AUTO: 1.2 % — HIGH (ref 0–0.9)
LYMPHOCYTES # BLD AUTO: 0.55 K/UL — LOW (ref 1–3.3)
LYMPHOCYTES # BLD MANUAL: 0.39 K/UL — LOW (ref 1–3.3)
LYMPHOCYTES NFR BLD AUTO: 22.3 % — SIGNIFICANT CHANGE UP (ref 13–44)
LYMPHOCYTES NFR BLD MANUAL: 15.6 % — SIGNIFICANT CHANGE UP (ref 13–44)
MACROCYTES BLD QL: SLIGHT — SIGNIFICANT CHANGE UP
MANUAL NRBC #: 0.02 K/UL — HIGH (ref 0–0)
MCHC RBC-ENTMCNC: 29.5 PG — SIGNIFICANT CHANGE UP (ref 27–34)
MCHC RBC-ENTMCNC: 31.1 G/DL — LOW (ref 32–36)
MCV RBC AUTO: 95 FL — SIGNIFICANT CHANGE UP (ref 80–100)
MICROCYTES BLD QL: SLIGHT — SIGNIFICANT CHANGE UP
MONOCYTES # BLD AUTO: 0.24 K/UL — SIGNIFICANT CHANGE UP (ref 0–0.9)
MONOCYTES # BLD MANUAL: 0.26 K/UL — SIGNIFICANT CHANGE UP (ref 0–0.9)
MONOCYTES NFR BLD AUTO: 9.7 % — SIGNIFICANT CHANGE UP (ref 2–14)
MONOCYTES NFR BLD MANUAL: 10.7 % — SIGNIFICANT CHANGE UP (ref 2–14)
NEUTROPHILS # BLD AUTO: 1.59 K/UL — LOW (ref 1.8–7.4)
NEUTROPHILS # BLD MANUAL: 1.78 K/UL — LOW (ref 1.8–7.4)
NEUTROPHILS NFR BLD AUTO: 64.4 % — SIGNIFICANT CHANGE UP (ref 43–77)
NEUTROPHILS NFR BLD MANUAL: 72.1 % — SIGNIFICANT CHANGE UP (ref 43–77)
NRBC # BLD AUTO: 0.02 K/UL — HIGH (ref 0–0)
NRBC # BLD: 1 /100 WBCS — HIGH (ref 0–0)
NRBC # FLD: 0.02 K/UL — HIGH (ref 0–0)
NRBC BLD-RTO: 1 /100 WBCS — HIGH (ref 0–0)
PLAT MORPH BLD: NORMAL — SIGNIFICANT CHANGE UP
PLATELET # BLD AUTO: 154 K/UL — SIGNIFICANT CHANGE UP (ref 150–400)
PMV BLD: 11 FL — SIGNIFICANT CHANGE UP (ref 7–13)
POIKILOCYTOSIS BLD QL AUTO: SLIGHT — SIGNIFICANT CHANGE UP
POLYCHROMASIA BLD QL SMEAR: SLIGHT — SIGNIFICANT CHANGE UP
RBC # BLD: 2.81 M/UL — LOW (ref 3.8–5.2)
RBC # FLD: 18.4 % — HIGH (ref 10.3–14.5)
RBC BLD AUTO: ABNORMAL
WBC # BLD: 2.47 K/UL — LOW (ref 3.8–10.5)
WBC # FLD AUTO: 2.47 K/UL — LOW (ref 3.8–10.5)

## 2025-03-29 PROCEDURE — 36415 COLL VENOUS BLD VENIPUNCTURE: CPT

## 2025-03-29 PROCEDURE — 85025 COMPLETE CBC W/AUTO DIFF WBC: CPT

## 2025-03-29 PROCEDURE — 99239 HOSP IP/OBS DSCHRG MGMT >30: CPT

## 2025-03-29 PROCEDURE — 86850 RBC ANTIBODY SCREEN: CPT

## 2025-03-29 PROCEDURE — 86900 BLOOD TYPING SEROLOGIC ABO: CPT

## 2025-03-29 PROCEDURE — 86922 COMPATIBILITY TEST ANTIGLOB: CPT

## 2025-03-29 PROCEDURE — 80048 BASIC METABOLIC PNL TOTAL CA: CPT

## 2025-03-29 PROCEDURE — 96372 THER/PROPH/DIAG INJ SC/IM: CPT

## 2025-03-29 PROCEDURE — 85730 THROMBOPLASTIN TIME PARTIAL: CPT

## 2025-03-29 PROCEDURE — 36430 TRANSFUSION BLD/BLD COMPNT: CPT

## 2025-03-29 PROCEDURE — 96374 THER/PROPH/DIAG INJ IV PUSH: CPT

## 2025-03-29 PROCEDURE — G0378: CPT

## 2025-03-29 PROCEDURE — P9016: CPT

## 2025-03-29 PROCEDURE — 96375 TX/PRO/DX INJ NEW DRUG ADDON: CPT

## 2025-03-29 PROCEDURE — 86901 BLOOD TYPING SEROLOGIC RH(D): CPT

## 2025-03-29 PROCEDURE — 85610 PROTHROMBIN TIME: CPT

## 2025-03-29 PROCEDURE — 96376 TX/PRO/DX INJ SAME DRUG ADON: CPT

## 2025-03-29 PROCEDURE — 99285 EMERGENCY DEPT VISIT HI MDM: CPT | Mod: 25

## 2025-03-29 RX ORDER — HEPARIN SODIUM,PORCINE/NS/PF 20/20 ML
300 SYRINGE (ML) INTRAVENOUS ONCE
Refills: 0 | Status: COMPLETED | OUTPATIENT
Start: 2025-03-29 | End: 2025-03-29

## 2025-03-29 RX ADMIN — DIAZEPAM 2 MILLIGRAM(S): 2 TABLET ORAL at 06:03

## 2025-03-29 RX ADMIN — Medication 5 MILLIGRAM(S): at 06:03

## 2025-03-29 RX ADMIN — MEMANTINE HYDROCHLORIDE 10 MILLIGRAM(S): 21 CAPSULE, EXTENDED RELEASE ORAL at 06:03

## 2025-03-29 RX ADMIN — PREGABALIN 200 MILLIGRAM(S): 50 CAPSULE ORAL at 06:03

## 2025-03-29 RX ADMIN — Medication 2 MILLIGRAM(S): at 08:27

## 2025-03-29 RX ADMIN — Medication 2 MILLIGRAM(S): at 06:29

## 2025-03-29 RX ADMIN — Medication 300 UNIT(S): at 11:14

## 2025-03-29 RX ADMIN — Medication 2 MILLIGRAM(S): at 09:36

## 2025-03-29 RX ADMIN — Medication 2 MILLIGRAM(S): at 00:35

## 2025-03-29 RX ADMIN — OCTREOTIDE ACETATE 100 MICROGRAM(S): 500 INJECTION, SOLUTION INTRAVENOUS; SUBCUTANEOUS at 06:03

## 2025-03-29 NOTE — ED CDU PROVIDER SUBSEQUENT DAY NOTE - ATTENDING CONTRIBUTION TO CARE
No acute issues. Receiving PBC transfusion well with without complications. will reassess after transfusion completion.

## 2025-03-29 NOTE — ED CDU PROVIDER DISPOSITION NOTE - CLINICAL COURSE
38yo female with pmhx breast CA, well known to ED for frequent blood transfusions, presents to ED after being sent in by MD for low hemoglobin. Pt states she had bloodwork done outpatient yesterday and hg was 7.5. Pt states she is having generalized weakness and mild nausea however this is her normal baseline. ED labs reviewed. Hgb in ED 6.2. Pt placed into observation for 2 units PRBCs. Repeat a.m. CBC shows improvement with hcb 8.3. Pt stable for d/c, return precautions d/w pt.

## 2025-03-29 NOTE — ED ADULT NURSE REASSESSMENT NOTE - NS ED NURSE REASSESS COMMENT FT1
Assumed care for patient from VADIM Hahn. Patient resting comfortably on the stretcher. AM labs completed.  Dilaudid Q2HRS as ordered. NAD noted

## 2025-03-29 NOTE — ED CDU PROVIDER DISPOSITION NOTE - PATIENT PORTAL LINK FT
You can access the FollowMyHealth Patient Portal offered by [facility] by registering at the following website: [email or web address]. By joining [email or web address]’s FollowMyHealth portal, you will also be able to view your health information using other applications (apps) compatible with our system.

## 2025-03-29 NOTE — ED ADULT NURSE REASSESSMENT NOTE - NS ED NURSE REASSESS COMMENT FT1
Pt A&Ox4. pt calm and cooperative. pt not in acute distress, resps even and unlabored b/l, no SOB, NO CP. VSS. POC reviewed. call bell within reach, safety maintained. Plan of care ongoing. Pt A&Ox4. pt calm and cooperative. pt not in acute distress, resps even and unlabored b/l, no SOB, NO CP. VSS. POC reviewed. call bell within reach, safety maintained. Pt tolerating blood transfusion well at present. Plan of care ongoing.

## 2025-03-29 NOTE — ED CDU PROVIDER DISPOSITION NOTE - ATTENDING CONTRIBUTION TO CARE
Patient completed PRBC transfusion without complications. patient feels comfortable with discharge and medical plan; PMD or clinic follow up recommended for reassessment. Patient is aware of signs/symptoms to return to the emergency department.

## 2025-03-29 NOTE — ED ADULT NURSE REASSESSMENT NOTE - NS ED NURSE REASSESS COMMENT FT1
Pt receiving blood transfusion. Pt tolerating transfusion well with no s/s and VSS at this time. Monitoring ongoing.

## 2025-03-29 NOTE — ED CDU PROVIDER SUBSEQUENT DAY NOTE - NS ED ROS FT
Gen: denies fever, chills, fatigue, weight loss  Skin: denies rashes, laceration, bruising  HEENT: denies visual changes, ear pain, nasal congestion, throat pain  Respiratory: denies LOUIE, SOB, cough, wheezing  Cardiovascular: denies chest pain, palpitations, diaphoresis, LE edema  GI: +nausea  : denies dysuria, frequency, urgency, bowel/bladder incontinence  MSK: denies joint swelling/pain, back pain, neck pain  Neuro: denies headache, dizziness, weakness, numbness

## 2025-03-29 NOTE — ED CDU PROVIDER SUBSEQUENT DAY NOTE - CLINICAL SUMMARY MEDICAL DECISION MAKING FREE TEXT BOX
38yo female with pmhx breast CA, well known to ED for frequent blood transfusions, presents to ED after being sent in by MD for low hemoglobin. Pt states she had bloodwork done outpatient yesterday and hg was 7.5. Pt states she is having generalized weakness and mild nausea however this is her normal baseline. ED labs reviewed. Hgb in ED 6.2. Pt placed into observation for 2 units PRBCs. Will continue to monitor.

## 2025-03-29 NOTE — ED ADULT NURSE REASSESSMENT NOTE - NS ED NURSE REASSESS COMMENT FT1
pt calm and cooperative. pt not in acute distress, resps even and unlabored b/l, no SOB, NO CP. VSS. POC reviewed. call bell within reach, safety maintained. Plan of care ongoing.

## 2025-04-10 NOTE — DISCHARGE NOTE PROVIDER - TIME SPENT: (MINUTES SPENT ON THE DISCHARGE SERVICE)
Ricardo Cervantes MD to [facility]      4/8/25  5:09 PM  Filled. Needs appt for med check, not urgent.      35

## 2025-04-28 NOTE — ED PROVIDER NOTE - CARE PLAN
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  She has no evidence of recurrence of disease today.  CAT scan and exam are unremarkable.  Will see the patient back in 6 months for repeat evaluation.    The patient is up-to-date for colonoscopy.  She had a large polyp last year and is due for repeat colonoscopy this year.  It is scheduled for June.  She does not undergo mammography due to bilateral mastectomies.  Orders:    CT chest abdomen pelvis w contrast; Future    BUN; Future    Creatinine, serum; Future     Principal Discharge DX:	Encounter for laboratory testing for COVID-19 virus

## 2025-04-30 NOTE — ED ADULT NURSE NOTE - NS ED NURSE LEVEL OF CONSCIOUSNESS SPEECH
Current Medications[1]    Levothyroxine Sodium 125 MCG Oral Tab Take 1 tablet (125 mcg total) by mouth before breakfast. 30 tablet 11       90 days        [1]   Current Outpatient Medications   Medication Sig Dispense Refill    atomoxetine 40 MG Oral Cap Take 1 capsule (40 mg total) by mouth in the morning. 30 capsule 3    sertraline 100 MG Oral Tab Take 2 tablets (200 mg total) by mouth at bedtime.      buPROPion  MG Oral Tablet 24 Hr Take 1 tablet (300 mg total) by mouth nightly.      Psyllium (METAMUCIL) 48.57 % Oral Powder Take as daily as directed on bottle      aspirin 81 MG Oral Chew Tab Chew 1 tablet (81 mg total) by mouth daily.      diazePAM 5 MG Oral Tab Take 1 tablet (5 mg total) by mouth 2 (two) times daily. 60 tablet 2    Potassium Chloride ER 20 MEQ Oral Tab CR Take 1 tablet by mouth in the morning.      Insulin Glargine, 1 Unit Dial, (TOUJEO SOLOSTAR) 300 UNIT/ML Subcutaneous Solution Pen-injector Inject 22 Units into the skin every evening.      atorvastatin 80 MG Oral Tab Take 1 tablet (80 mg total) by mouth nightly.      carvedilol 12.5 MG Oral Tab Take 3 tablets (37.5 mg total) by mouth in the morning and 3 tablets (37.5 mg total) in the evening. Take with meals.      allopurinol 300 MG Oral Tab TAKE ONE-HALF (1/2) TABLET DAILY 45 tablet 4    Levothyroxine Sodium 125 MCG Oral Tab Take 1 tablet (125 mcg total) by mouth before breakfast. 30 tablet 11    Pantoprazole Sodium 40 MG Oral Tab EC TAKE 1 TABLET EVERY MORNING BEFORE BREAKFAST 90 tablet 3    Insulin Pen Needle (BD PEN NEEDLE ORIGINAL U/F) 29G X 12.7MM Does not apply Misc USE AS DIRECTED DAILY 100 each 3    hydrALAZINE HCl 100 MG Oral Tab Take 25 mg by mouth in the morning and 25 mg in the evening and 25 mg before bedtime. Take tid, hold afternoon hydralazine if sbp <150 but usually does not take.      ERICKSON CONTOUR NEXT TEST In Vitro Strip TEST BLOOD GLUCOSE THREE TIMES DAILY 300 strip 4      Speaking Coherently

## 2025-05-09 NOTE — PATIENT PROFILE ADULT - DO YOU FEEL UNSAFE AT HOME, WORK, OR SCHOOL?
Patient called stating that she would like to speak to Diane in regards to her medication for fluticasone-umeclidin-vilanterol (TRELEGY ELLIPTA) 200-62.5-25 MCG/ACT inhaler patient states that the pharmacy has went up on the cost of it and it is too expensive so she is wanting to know if there is something that she can take that cost less.    no

## 2025-05-16 NOTE — ED ADULT NURSE NOTE - CAS EDN DISCHARGE INTERVENTIONS
Chief Complaint   Patient presents with    Follow-Up     Trying to lose weight     Lab Results     In chart      HISTORY OF PRESENT ILLNESS:   Patient is a pleasant 33 y.o. female patient with past medical history of dyslipidemia , vitamin D deficiency , following gynecology for Depo Provera injection and obesity BMI of 35     # Prediabetes  - Reviewed labs.  Hemoglobin A1c of 6.0.  -Patient amenable for nutrition referral.  She has been regularly exercising and walking 30 minutes a day.  Also has been eating healthy    # Hair loss  - Patient states that her hair seems to be growing.  She has not noted any other patches of hair hair loss from last visit.  Labs ordered from last visit largely unremarkable.  Referral to dermatology was approved however it is in [place].    # Anxiety depression  Addressed last visit.Patient states that she feels that she is feeling much better.  She has not yet made an appointment with behavioral health but will be doing so.  No active or passive suicidal ideation    # Transaminitis mild     Alcohol use  - Continues to drink about 2-3 shots of vodka at night however is motivated into stopping.  Reviewed labs.  AST of 47, ALT of 66..  AST and ALT from last year noted to be normal.  She denies any abdominal pain, denies any jaundice, denies nausea /vomiting.  Asymptomatic. Previous screening for hepatitis negative.     #Vitamin D deficiency  -Vitamin D of 25, has completed high-dose vitamin D weekly.  Currently asymptomatic    # Elevated triglycerides  Triglycerides 292.  Continues to work on eating healthy and having regular exercise.  However states that sometimes she eats unhealthy food whenever her family eats unhealthy food      Past Medical History[1]    Patient Active Problem List    Diagnosis Date Noted    Obesity (BMI 30-39.9) 06/06/2024    Elevated triglycerides  03/23/2022    History of trichomoniasis 03/23/2022    Vitamin D deficiency 06/08/2016       Patient has no known  "allergies.    Current Medications[2]    Social History[3]    Family History   Problem Relation Age of Onset    Arthritis Paternal Grandmother     Hypertension Paternal Grandmother        Review of Systems   Constitutional: Negative.    HENT: Negative.     Eyes: Negative.    Respiratory:  Negative for cough and hemoptysis.    Cardiovascular: Negative.  Negative for chest pain and palpitations.   Gastrointestinal: Negative.    Genitourinary: Negative.    Musculoskeletal: Negative.    Skin:         See HPI   Neurological: Negative.    Endo/Heme/Allergies: Negative.    Psychiatric/Behavioral:          See HPI       Exam:  /83 (BP Location: Left arm, Patient Position: Sitting, BP Cuff Size: Adult)   Pulse 86   Temp 36.7 °C (98 °F) (Temporal)   Ht 1.702 m (5' 7\")   Wt 102 kg (225 lb 3.2 oz)   SpO2 96%  Body mass index is 35.27 kg/m².    Constitutional:  Not in acute distress, well appearing.  HEENT:   NC/AT  Cardiovascular: Regular rate and rhythm. No murmurs or gallops.      Lungs:   Clear to auscultation bilaterally. No wheezes or crackles. No respiratory distress.  Abdomen: Not distended, soft, not tender. No guarding or rigidity. No masses.  Extremities:  No cyanosis/clubbing/edema. No obvious deformities.  Skin:   Noted focal one round patch of hair loss on frontal area of scalp, otherwise no noted erythema or flaking.  No noted abnormalities in the scalp and in healthy normal distribution.  No other patches of hair loss noted   Neurologic: Alert & oriented x 3, CN II-XII grossly intact, strength and sensation grossly intact.  No focal deficits noted.  Psychiatric:  Affect normal, mood normal, judgment normal.    Assessment/Plan:   Alopecia areata  -Improving  - dermatology placed last visit however dermatology available is at [place]  - Ideally patient would benefit with intralesional steroid with dermatology. We will start patient on topical steroids as an alternative and see patient response.    2.  " Transaminitis         Alcohol use  -Asymptomatic, no concern for obstruction.  - right upper quadrant ultrasound due to concern for possible metabolic dysfunction associated steatohepatitis however low FIB score and NAFLD fibrosis score  - Advised to continue cessation and drinking alcohol.  Does not want to try any pharmacotherapy for now.  - Repeat liver function test in 3 to 4 months  -Lifestyle modifications, referral to nutrition placed    3. Prediabetes  - Lifestyle modifications including continuing regular exercise, healthy diet  -Amenable for nutrition referral.  Nutrition referral placed  - Repeat hemoglobin A1c in 3 months    4.  Anxiety, depression  -No active or passive suicidal ideation  - Provided number to call to make an appointment with behavioral health    5.  Vitamin D deficiency  -Vitamin D of 25, previous vitamin D of 15.  -High-dose vitamin D prescribed.  If vitamin D continues to be low despite supplementation, workup for malabsorption or other causes of vitamin D deficiency to be done    6.  Elevated triglycerides  -No indication for pharmacotherapy for now  -Consider repeat in 6 to 8 months  -Modifications as above    All imaging results and lab results and consult notes are reviewed at this visit.  Followup: Return in about 8 weeks (around 7/11/2025).  Reestablish care with new PCP    Please note that this dictation was created using voice recognition software. I have made every reasonable attempt to correct obvious errors, but I expect that there are errors of grammar and possibly content that I did not discover before finalizing the note.    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 12 Capsule 0     betamethasone dipropionate (DEL-BETA) 0.05 % Ointment Apply on affected area once or twice a day as directed.  Apply sparingly 15 g 0     Current Facility-Administered Medications   Medication Dose Route Frequency Provider Last Rate Last Admin    medroxyPROGESTERone (Depo-Provera) injection 150 mg  150 mg Intramuscular Q90 DAYS    150 mg at 25 0934    medroxyPROGESTERone (Depo-Provera) injection 150 mg  150 mg Intramuscular Q90 DAYS    150 mg at 24 1024    medroxyPROGESTERone (DEPO-PROVERA) injection 150 mg  150 mg Intramuscular Q90 DAYS Keke Huntley M.D.   150 mg at 23 1603   [3]   Social History  Tobacco Use    Smoking status: Former     Current packs/day: 0.00     Average packs/day: 0.3 packs/day for 4.0 years (1.0 ttl pk-yrs)     Types: Cigarettes     Start date: 2013     Quit date: 2017     Years since quittin.0    Smokeless tobacco: Never   Vaping Use    Vaping status: Every Day    Substances: Nicotine   Substance Use Topics    Alcohol use: Yes     Comment: occ    Drug use: No      IV discontinued, cath removed intact

## 2025-05-20 NOTE — ED CDU PROVIDER DISPOSITION NOTE - NS ED MD DISPO DISCHARGE CCDA
[Reviewed Clinical Lab Test(s)] : Results of clinical tests were reviewed. [Reviewed Radiology Report(s)] : Radiology reports were reviewed. Patient/Caregiver provided printed discharge information. [Reviewed Radiology Film/Image(s)] : Images from radiology studies were reviewed and examined. [Discuss Alternatives/Risks/Benefits w/Patient] : All alternatives, risks, and benefits were discussed with the patient/family and all questions were answered.  Patient expressed good understanding and appreciates the importance of follow up as recommended. [FreeTextEntry1] : 61 year old Stage IVB uterine adenocarcinoma s/p x6 cycles of carbo/taxol/dostarlimab now on maintenance C2 dostarlimab. Perforated appendix s/p oral antibiotic course and laparoscopic appendectomy 04/25/24. Recently returned from trip to [facility]. Findings of most recent CT scan are stable, no need for PETCT at this time.   [] Prechemo labs today [] Maintenance dostarlimab C2 5/23/25 [] Return to clinic in 6 weeks

## 2025-05-21 NOTE — ED ADULT TRIAGE NOTE - WEIGHT IN KG
----- Message from Nell sent at 5/21/2025  8:26 AM CDT -----  Refill Adderall 20 mg tablets, Niki.   71.6

## 2025-05-26 NOTE — PROGRESS NOTE ADULT - ASSESSMENT
39y  Female with h/o metastatic breast cancer ER/RI Neg, HER-2 + on chemotherapy (last dose 12/26/24) (mets to lung, liver, spleen, spine, bone and brain), gastritis w/ intermittent episodes of dark stool (follows w/ Dr. Rosado GI and is on PPI and octreotide daily). Patient presented 1/5 with c/o worsening SOB.  She was seen in ED 1/2/25 for for weakness and SOB at which time she was found to have Hb of 5 requiring PRBC transfusion and positive for Flu A and started on tamiflu and was discharged from the ED on 1/3/25.  Her respiratory status has worsened since then w/ productive cough associated with body aches and chills.  Denies sick contacts but has had many frequent hospital visits. Patient has been afebrile, no leukocytosis. Was placed on BIPAP for Worsening respiratory status. continued on Tamiflu. Vancomycn and Zosyn was added. Blood cultures with MSSA.     Metastatic breast cancer   - last dose of trastuzumab was on 12/26/24.  -All treatment on hold at the moment.  -Follow up with primary oncologist, Dr. Chapa after discharge    Cytopenias   - secondary to malignancy and acute illness and now GIB  - S/P multiple transfusions  - GI evaluated for large bloody bowel movement  Suspect rectal bleeding is due to diverticular bleed which are self-limiting - If patient becomes hemodynamically unstable, requiring multiple transfusions,   - CT A/P No evidence of active intraluminal extravasation of contrast.   - GI f/u noted.  no plans for scope.  Previously scoped in Nov. 2024. internal hemorrhoids, gastric erosions, no active bleeding site   - c/w home sub q octreotide and IV protonix  - Continue Amicar 4 grams Q 6 H for bleeding- will titrate according to HGB   - Has not been transfused since 1/17  - Consider decreasing Amicar to 3.5 Grams q 6  -Transfuse if hgb<7.0.      Respiratory failure   - multifocal pneumonia and flu+  - S/P oseltamivir    - Management as per primary team.  - TTE 1/6 with no veg  - Cardiology following- . No MARYANN at this time until stabilizes and source of bleed ascertained    MSSA Bacteremia  - cultures persistently positive. pt afebrile.   --  ID following -On nafcillin   -- S/P Port removal 1/10/25-  PICC or midline prior to d/c for outpt chemo     + Noro virus and giardia lamblia  - ID following For Giardia, confirm the Giardia stool Ag  - Continue Albendazole 400mg PO q 24hours x 5 days in the meantime for Giardia     Cont aggressive pain management. Remains on PCA pump.    Will follow                39y  Female with h/o metastatic breast cancer ER/DE Neg, HER-2 + on chemotherapy (last dose 12/26/24) (mets to lung, liver, spleen, spine, bone and brain), gastritis w/ intermittent episodes of dark stool (follows w/ Dr. Rosado GI and is on PPI and octreotide daily). Patient presented 1/5 with c/o worsening SOB.  She was seen in ED 1/2/25 for for weakness and SOB at which time she was found to have Hb of 5 requiring PRBC transfusion and positive for Flu A and started on tamiflu and was discharged from the ED on 1/3/25.  Her respiratory status has worsened since then w/ productive cough associated with body aches and chills.  Denies sick contacts but has had many frequent hospital visits. Patient has been afebrile, no leukocytosis. Was placed on BIPAP for Worsening respiratory status. continued on Tamiflu. Vancomycn and Zosyn was added. Blood cultures with MSSA.     Metastatic breast cancer   - last dose of trastuzumab was on 12/26/24.  -All treatment on hold at the moment.  -Follow up with primary oncologist, Dr. Chapa after discharge    Cytopenias   - secondary to malignancy and acute illness and now GIB  - S/P multiple transfusions  - GI evaluated for large bloody bowel movement  Suspect rectal bleeding is due to diverticular bleed which are self-limiting - If patient becomes hemodynamically unstable, requiring multiple transfusions,   - CT A/P No evidence of active intraluminal extravasation of contrast.   - GI f/u noted.  no plans for scope.  Previously scoped in Nov. 2024. internal hemorrhoids, gastric erosions, no active bleeding site   - c/w home sub q octreotide and IV protonix  - Continue Amicar 4 grams Q 6 H for bleeding- will titrate according to HGB   - Has not been transfused since 1/17  - Will check hgb in AM if stable - Consider decreasing Amicar to 3.5 Grams q 6  -Transfuse if hgb<7.0.      Respiratory failure   - multifocal pneumonia and flu+  - S/P oseltamivir    - Management as per primary team.  - TTE 1/6 with no veg  - Cardiology following- . No MARYANN at this time until stabilizes and source of bleed ascertained    MSSA Bacteremia  - cultures persistently positive. pt afebrile.   --  ID following -On nafcillin   -- S/P Port removal 1/10/25-  PICC or midline prior to d/c for outpt chemo     + Noro virus and giardia lamblia  - ID following For Giardia, confirm the Giardia stool Ag  - Continue Albendazole 400mg PO q 24hours x 5 days in the meantime for Giardia     Cont aggressive pain management. Remains on PCA pump.    Will follow                [Dear  ___] : Dear  [unfilled], [Follow-Up] : Your patient, [unfilled] was seen in my office today for follow-up [Please see my note below.] : Please see my note below.

## 2025-05-29 NOTE — ED ADULT NURSE NOTE - OBJECTIVE STATEMENT
Pt in no apparent distress at this time. Airway patent, breathing spontaneous and nonlabored. Pt A&Ox3 resting in stretcher. Pt c/o       , abdominal pain, nausea, black/green stool. recent visit to ED req blood transfusion. pt on monitor
Yes

## 2025-06-14 ENCOUNTER — EMERGENCY (EMERGENCY)
Facility: HOSPITAL | Age: 40
LOS: 1 days | End: 2025-06-14
Attending: EMERGENCY MEDICINE
Payer: MEDICARE

## 2025-06-14 VITALS
TEMPERATURE: 99 F | HEART RATE: 79 BPM | RESPIRATION RATE: 18 BRPM | SYSTOLIC BLOOD PRESSURE: 114 MMHG | HEIGHT: 61 IN | DIASTOLIC BLOOD PRESSURE: 77 MMHG | WEIGHT: 143.96 LBS | OXYGEN SATURATION: 98 %

## 2025-06-14 DIAGNOSIS — Z90.89 ACQUIRED ABSENCE OF OTHER ORGANS: Chronic | ICD-10-CM

## 2025-06-14 DIAGNOSIS — Z98.890 OTHER SPECIFIED POSTPROCEDURAL STATES: Chronic | ICD-10-CM

## 2025-06-14 LAB
ALBUMIN SERPL ELPH-MCNC: 3.9 G/DL — SIGNIFICANT CHANGE UP (ref 3.3–5.2)
ALP SERPL-CCNC: 241 U/L — HIGH (ref 40–120)
ALT FLD-CCNC: 45 U/L — HIGH
ANION GAP SERPL CALC-SCNC: 13 MMOL/L — SIGNIFICANT CHANGE UP (ref 5–17)
AST SERPL-CCNC: 39 U/L — HIGH
BILIRUB SERPL-MCNC: 0.6 MG/DL — SIGNIFICANT CHANGE UP (ref 0.4–2)
BUN SERPL-MCNC: 14.1 MG/DL — SIGNIFICANT CHANGE UP (ref 8–20)
CALCIUM SERPL-MCNC: 8.6 MG/DL — SIGNIFICANT CHANGE UP (ref 8.4–10.5)
CHLORIDE SERPL-SCNC: 101 MMOL/L — SIGNIFICANT CHANGE UP (ref 96–108)
CO2 SERPL-SCNC: 22 MMOL/L — SIGNIFICANT CHANGE UP (ref 22–29)
CREAT SERPL-MCNC: 0.57 MG/DL — SIGNIFICANT CHANGE UP (ref 0.5–1.3)
EGFR: 119 ML/MIN/1.73M2 — SIGNIFICANT CHANGE UP
EGFR: 119 ML/MIN/1.73M2 — SIGNIFICANT CHANGE UP
GLUCOSE SERPL-MCNC: 111 MG/DL — HIGH (ref 70–99)
HCT VFR BLD CALC: 37.1 % — SIGNIFICANT CHANGE UP (ref 34.5–45)
HGB BLD-MCNC: 11 G/DL — LOW (ref 11.5–15.5)
MCHC RBC-ENTMCNC: 29.6 G/DL — LOW (ref 32–36)
MCHC RBC-ENTMCNC: 29.7 PG — SIGNIFICANT CHANGE UP (ref 27–34)
MCV RBC AUTO: 100.3 FL — HIGH (ref 80–100)
NRBC # BLD AUTO: 0.02 K/UL — HIGH (ref 0–0)
NRBC # FLD: 0.02 K/UL — HIGH (ref 0–0)
NRBC BLD AUTO-RTO: 0 /100 WBCS — SIGNIFICANT CHANGE UP (ref 0–0)
PLATELET # BLD AUTO: 137 K/UL — LOW (ref 150–400)
PMV BLD: 10.4 FL — SIGNIFICANT CHANGE UP (ref 7–13)
POTASSIUM SERPL-MCNC: 4.1 MMOL/L — SIGNIFICANT CHANGE UP (ref 3.5–5.3)
POTASSIUM SERPL-SCNC: 4.1 MMOL/L — SIGNIFICANT CHANGE UP (ref 3.5–5.3)
PROT SERPL-MCNC: 6.4 G/DL — LOW (ref 6.6–8.7)
RBC # BLD: 3.7 M/UL — LOW (ref 3.8–5.2)
RBC # FLD: 17.5 % — HIGH (ref 10.3–14.5)
SODIUM SERPL-SCNC: 136 MMOL/L — SIGNIFICANT CHANGE UP (ref 135–145)
WBC # BLD: 4.36 K/UL — SIGNIFICANT CHANGE UP (ref 3.8–10.5)
WBC # FLD AUTO: 4.36 K/UL — SIGNIFICANT CHANGE UP (ref 3.8–10.5)

## 2025-06-14 PROCEDURE — 99285 EMERGENCY DEPT VISIT HI MDM: CPT

## 2025-06-14 PROCEDURE — 70450 CT HEAD/BRAIN W/O DYE: CPT | Mod: 26

## 2025-06-14 PROCEDURE — 93010 ELECTROCARDIOGRAM REPORT: CPT

## 2025-06-14 RX ORDER — ONDANSETRON HCL/PF 4 MG/2 ML
4 VIAL (ML) INJECTION ONCE
Refills: 0 | Status: COMPLETED | OUTPATIENT
Start: 2025-06-14 | End: 2025-06-14

## 2025-06-14 RX ORDER — DEXAMETHASONE 0.5 MG/1
10 TABLET ORAL ONCE
Refills: 0 | Status: COMPLETED | OUTPATIENT
Start: 2025-06-14 | End: 2025-06-14

## 2025-06-14 RX ORDER — ACETAMINOPHEN 500 MG/5ML
1000 LIQUID (ML) ORAL ONCE
Refills: 0 | Status: COMPLETED | OUTPATIENT
Start: 2025-06-14 | End: 2025-06-14

## 2025-06-14 RX ORDER — MAGNESIUM SULFATE 500 MG/ML
1 SYRINGE (ML) INJECTION ONCE
Refills: 0 | Status: COMPLETED | OUTPATIENT
Start: 2025-06-14 | End: 2025-06-14

## 2025-06-14 RX ORDER — KETOROLAC TROMETHAMINE 30 MG/ML
15 INJECTION, SOLUTION INTRAMUSCULAR; INTRAVENOUS ONCE
Refills: 0 | Status: DISCONTINUED | OUTPATIENT
Start: 2025-06-14 | End: 2025-06-14

## 2025-06-14 RX ORDER — DIPHENHYDRAMINE HCL 12.5MG/5ML
50 ELIXIR ORAL ONCE
Refills: 0 | Status: COMPLETED | OUTPATIENT
Start: 2025-06-14 | End: 2025-06-14

## 2025-06-14 RX ADMIN — DEXAMETHASONE 102 MILLIGRAM(S): 0.5 TABLET ORAL at 20:28

## 2025-06-14 RX ADMIN — Medication 400 MILLIGRAM(S): at 20:14

## 2025-06-14 RX ADMIN — Medication 100 GRAM(S): at 21:20

## 2025-06-14 RX ADMIN — KETOROLAC TROMETHAMINE 15 MILLIGRAM(S): 30 INJECTION, SOLUTION INTRAMUSCULAR; INTRAVENOUS at 23:50

## 2025-06-14 RX ADMIN — Medication 1000 MILLIGRAM(S): at 20:55

## 2025-06-14 RX ADMIN — KETOROLAC TROMETHAMINE 15 MILLIGRAM(S): 30 INJECTION, SOLUTION INTRAMUSCULAR; INTRAVENOUS at 22:55

## 2025-06-14 RX ADMIN — Medication 50 MILLIGRAM(S): at 22:55

## 2025-06-14 RX ADMIN — Medication 4 MILLIGRAM(S): at 19:36

## 2025-06-15 VITALS
HEART RATE: 62 BPM | SYSTOLIC BLOOD PRESSURE: 135 MMHG | OXYGEN SATURATION: 95 % | RESPIRATION RATE: 18 BRPM | DIASTOLIC BLOOD PRESSURE: 77 MMHG | TEMPERATURE: 98 F

## 2025-06-15 PROCEDURE — 85027 COMPLETE CBC AUTOMATED: CPT

## 2025-06-15 PROCEDURE — 84702 CHORIONIC GONADOTROPIN TEST: CPT

## 2025-06-15 PROCEDURE — 82962 GLUCOSE BLOOD TEST: CPT

## 2025-06-15 PROCEDURE — 96374 THER/PROPH/DIAG INJ IV PUSH: CPT | Mod: XU

## 2025-06-15 PROCEDURE — 70498 CT ANGIOGRAPHY NECK: CPT | Mod: 26

## 2025-06-15 PROCEDURE — 96376 TX/PRO/DX INJ SAME DRUG ADON: CPT | Mod: XU

## 2025-06-15 PROCEDURE — 70496 CT ANGIOGRAPHY HEAD: CPT

## 2025-06-15 PROCEDURE — 70496 CT ANGIOGRAPHY HEAD: CPT | Mod: 26

## 2025-06-15 PROCEDURE — 36415 COLL VENOUS BLD VENIPUNCTURE: CPT

## 2025-06-15 PROCEDURE — 93005 ELECTROCARDIOGRAM TRACING: CPT

## 2025-06-15 PROCEDURE — 96375 TX/PRO/DX INJ NEW DRUG ADDON: CPT | Mod: XU

## 2025-06-15 PROCEDURE — 80053 COMPREHEN METABOLIC PANEL: CPT

## 2025-06-15 PROCEDURE — 70450 CT HEAD/BRAIN W/O DYE: CPT

## 2025-06-15 PROCEDURE — 99285 EMERGENCY DEPT VISIT HI MDM: CPT | Mod: 25

## 2025-06-15 PROCEDURE — 70498 CT ANGIOGRAPHY NECK: CPT

## 2025-06-15 RX ORDER — DEXAMETHASONE 0.5 MG/1
4 TABLET ORAL ONCE
Refills: 0 | Status: COMPLETED | OUTPATIENT
Start: 2025-06-15 | End: 2025-06-15

## 2025-06-15 RX ORDER — HYDROMORPHONE/SOD CHLOR,ISO/PF 2 MG/10 ML
1 SYRINGE (ML) INJECTION ONCE
Refills: 0 | Status: DISCONTINUED | OUTPATIENT
Start: 2025-06-15 | End: 2025-06-15

## 2025-06-15 RX ORDER — HYDROMORPHONE/SOD CHLOR,ISO/PF 2 MG/10 ML
2 SYRINGE (ML) INJECTION ONCE
Refills: 0 | Status: DISCONTINUED | OUTPATIENT
Start: 2025-06-15 | End: 2025-06-15

## 2025-06-15 RX ORDER — DEXAMETHASONE 0.5 MG/1
1 TABLET ORAL
Qty: 4 | Refills: 0
Start: 2025-06-15 | End: 2025-06-16

## 2025-06-15 RX ORDER — METHADONE HCL 10 MG
10 TABLET ORAL ONCE
Refills: 0 | Status: DISCONTINUED | OUTPATIENT
Start: 2025-06-15 | End: 2025-06-15

## 2025-06-15 RX ORDER — HYDROMORPHONE/SOD CHLOR,ISO/PF 2 MG/10 ML
4 SYRINGE (ML) INJECTION ONCE
Refills: 0 | Status: DISCONTINUED | OUTPATIENT
Start: 2025-06-15 | End: 2025-06-15

## 2025-06-15 RX ADMIN — Medication 4 MILLIGRAM(S): at 06:42

## 2025-06-15 RX ADMIN — Medication 2 MILLIGRAM(S): at 03:28

## 2025-06-15 RX ADMIN — Medication 1 MILLIGRAM(S): at 07:48

## 2025-06-15 RX ADMIN — Medication 55 MILLIGRAM(S): at 06:34

## 2025-06-15 RX ADMIN — Medication 2 MILLIGRAM(S): at 04:00

## 2025-06-15 RX ADMIN — DEXAMETHASONE 4 MILLIGRAM(S): 0.5 TABLET ORAL at 05:16

## 2025-06-15 RX ADMIN — Medication 1 MILLIGRAM(S): at 00:42

## 2025-06-15 RX ADMIN — Medication 4 MILLIGRAM(S): at 05:16

## 2025-06-15 RX ADMIN — Medication 10 MILLIGRAM(S): at 06:34

## 2025-06-15 RX ADMIN — Medication 1 MILLIGRAM(S): at 01:30

## 2025-06-19 NOTE — ED ADULT NURSE NOTE - CADM POA PRESS ULCER
Patient does continue to experience some epigastric burning but extremely short-lived and fleeting.  The exact cause for the burning is not clear.  Not sure that it is really acid related.  She is not experiencing much a lot of heartburn with omeprazole 20 mg daily.  She is also using famotidine 40 mg 2 hours before bed.  She will continue with this regimen.  I would like to check B12, folate, and magnesium.          No

## 2025-07-07 NOTE — PROVIDER CONTACT NOTE (CRITICAL VALUE NOTIFICATION) - NAME OF MD/NP/PA/DO NOTIFIED:
Food & Nutrition Education    Diet Education: Stroke Pathway  Time Spent: 10 minutes  Learners: Patient    Handouts provided: Heart Healthy Nutrition Therapy, Low Sodium Nutrition Therapy    Comments: Discussed importance of eating a balanced diet with whole grains, fruits and vegetables, and lean protein sources. Encouraged heart-healthy unsaturated fats while limiting saturated fats, trans fats, and cholesterol intake. Reviewed high sodium foods that should be avoided. Food labels, salt free seasonings, and recommended sodium intake reviewed. All questions/concerns were addressed. RD contact provided.    Follow-Up: Yes  Please Re-consult as needed.    Thanks!  
Arianne GARCIA
JOSE Abbott
JOSE Buitrago
Coco GARCIA

## 2025-07-24 NOTE — ED PROVIDER NOTE - CPE EDP EYES NORM
Medicare Annual Wellness Visit    [place] is here for Medicare AWV (Here for yearly physical. )    Assessment & Plan  1. Chest pain: Significant improvement.  - Blood pressure is well-controlled.  - No headaches, vision changes, belly pain, constipation, or swelling noted.  - Currently on Imdur 30 mg once a day for long-acting nitroglycerin and aspirin 325 mg as needed.    2. Atrial fibrillation: Stable.  - On Eliquis twice a day and metoprolol 50 mg twice a day for management.  - No recent episodes of chest pain or other related symptoms.  - Follow-up with cardiologist last week indicated stable condition.    3. Hypertension: Well-controlled.  - On metoprolol 50 mg twice a day.  - No recent changes in medication or symptoms.    4. Hypercholesterolemia.  - On simvastatin 20 mg once a day.  - Recent blood work checked in the ER showed stable cholesterol levels.  - No new symptoms or medication changes.    5. Gastroesophageal reflux disease.  - Taking pantoprazole for reflux management.  - No recent symptoms of reflux reported.    6. Benign prostatic hyperplasia.  - On tamsulosin 0.4 mg for prostate enlargement symptoms.  - No significant urinary symptoms reported.    7. Cerumen impaction.  - Significant wax buildup noted in both ears, obscuring the view of the eardrums.  - Ear irrigation with warm water will be performed today.    8. Seborrheic keratosis.  - Multiple seborrheic keratoses present, one of which has been scratched and is bleeding.  - Advised to apply Neosporin ointment and avoid scratching the lesion to allow it to heal.  - Informed that the lesion itself will not disappear but can be managed with proper care.    9. Health maintenance.  - A urine specimen will be collected today for further analysis.    Follow-up  - Urine specimen collection today.  - Ear irrigation with warm water today.    Ear Cerumen Removal    Date/Time: 7/24/2025 12:26 PM    Performed by: Ronald Amor  normal...

## 2025-07-30 NOTE — ED ADULT NURSE NOTE - NS ED NURSE IV DC DT
Left message for mom to schedule for video visit.    Can put on 11/19 at 1140am or 450PM for video visit only if available.     Can also offer 11/25 video visit at 11am or 1pm    If none of these work please send to nurse to schedule.  
05-Mar-2025 11:00
Simple: Patient demonstrates quick and easy understanding
